# Patient Record
Sex: MALE | Race: WHITE | Employment: OTHER
[De-identification: names, ages, dates, MRNs, and addresses within clinical notes are randomized per-mention and may not be internally consistent; named-entity substitution may affect disease eponyms.]

---

## 2017-01-03 ENCOUNTER — OFFICE VISIT (OUTPATIENT)
Dept: FAMILY MEDICINE CLINIC | Facility: CLINIC | Age: 72
End: 2017-01-03

## 2017-01-03 VITALS
DIASTOLIC BLOOD PRESSURE: 76 MMHG | WEIGHT: 237.8 LBS | SYSTOLIC BLOOD PRESSURE: 134 MMHG | HEART RATE: 76 BPM | BODY MASS INDEX: 35.22 KG/M2 | HEIGHT: 69 IN

## 2017-01-03 DIAGNOSIS — M47.816 SPONDYLOSIS OF LUMBAR REGION WITHOUT MYELOPATHY OR RADICULOPATHY: ICD-10-CM

## 2017-01-03 DIAGNOSIS — E66.09 NON MORBID OBESITY DUE TO EXCESS CALORIES: ICD-10-CM

## 2017-01-03 DIAGNOSIS — K64.8 INTERNAL HEMORRHOIDS: ICD-10-CM

## 2017-01-03 DIAGNOSIS — R26.9 GAIT DISORDER: ICD-10-CM

## 2017-01-03 DIAGNOSIS — E78.2 MIXED HYPERLIPIDEMIA: ICD-10-CM

## 2017-01-03 DIAGNOSIS — K57.30 DIVERTICULOSIS OF LARGE INTESTINE WITHOUT HEMORRHAGE: ICD-10-CM

## 2017-01-03 DIAGNOSIS — I89.0 LYMPHEDEMA OF BOTH LOWER EXTREMITIES: ICD-10-CM

## 2017-01-03 DIAGNOSIS — K21.9 GASTROESOPHAGEAL REFLUX DISEASE WITHOUT ESOPHAGITIS: ICD-10-CM

## 2017-01-03 DIAGNOSIS — M21.371 FOOT DROP, RIGHT: ICD-10-CM

## 2017-01-03 DIAGNOSIS — I10 ESSENTIAL HYPERTENSION: Primary | ICD-10-CM

## 2017-01-03 DIAGNOSIS — K59.01 SLOW TRANSIT CONSTIPATION: ICD-10-CM

## 2017-01-03 PROCEDURE — 99214 OFFICE O/P EST MOD 30 MIN: CPT | Performed by: FAMILY MEDICINE

## 2017-01-03 RX ORDER — CYCLOBENZAPRINE HCL 10 MG
10 TABLET ORAL NIGHTLY PRN
Qty: 30 TABLET | Refills: 1 | Status: SHIPPED | OUTPATIENT
Start: 2017-01-03 | End: 2017-01-13

## 2017-01-03 ASSESSMENT — PATIENT HEALTH QUESTIONNAIRE - PHQ9
SUM OF ALL RESPONSES TO PHQ QUESTIONS 1-9: 0
SUM OF ALL RESPONSES TO PHQ9 QUESTIONS 1 & 2: 0
1. LITTLE INTEREST OR PLEASURE IN DOING THINGS: 0
2. FEELING DOWN, DEPRESSED OR HOPELESS: 0

## 2017-01-04 RX ORDER — BUMETANIDE 1 MG/1
TABLET ORAL
Qty: 180 TABLET | Refills: 3 | Status: SHIPPED | OUTPATIENT
Start: 2017-01-04 | End: 2017-10-11 | Stop reason: SDUPTHER

## 2017-01-04 RX ORDER — POTASSIUM CHLORIDE 20 MEQ/1
TABLET, EXTENDED RELEASE ORAL
Qty: 360 TABLET | Refills: 3 | Status: SHIPPED | OUTPATIENT
Start: 2017-01-04 | End: 2017-08-30 | Stop reason: SDUPTHER

## 2017-01-04 RX ORDER — METOPROLOL SUCCINATE 100 MG/1
TABLET, EXTENDED RELEASE ORAL
Qty: 90 TABLET | Refills: 3 | Status: SHIPPED | OUTPATIENT
Start: 2017-01-04 | End: 2018-01-29 | Stop reason: SDUPTHER

## 2017-01-04 RX ORDER — SPIRONOLACTONE 25 MG/1
TABLET ORAL
Qty: 90 TABLET | Refills: 3 | Status: SHIPPED | OUTPATIENT
Start: 2017-01-04 | End: 2017-12-11 | Stop reason: SDUPTHER

## 2017-01-04 RX ORDER — OMEPRAZOLE 20 MG/1
CAPSULE, DELAYED RELEASE ORAL
Qty: 90 CAPSULE | Refills: 3 | Status: SHIPPED | OUTPATIENT
Start: 2017-01-04 | End: 2017-12-11 | Stop reason: SDUPTHER

## 2017-01-04 RX ORDER — GABAPENTIN 300 MG/1
CAPSULE ORAL
Qty: 270 CAPSULE | Refills: 3 | Status: SHIPPED | OUTPATIENT
Start: 2017-01-04 | End: 2017-11-06 | Stop reason: SDUPTHER

## 2017-01-04 RX ORDER — HYDROCHLOROTHIAZIDE 25 MG/1
TABLET ORAL
Qty: 90 TABLET | Refills: 3 | Status: SHIPPED | OUTPATIENT
Start: 2017-01-04 | End: 2017-12-11 | Stop reason: SDUPTHER

## 2017-01-07 ASSESSMENT — ENCOUNTER SYMPTOMS
CHEST TIGHTNESS: 0
ABDOMINAL PAIN: 1
SORE THROAT: 0
BACK PAIN: 1
CONSTIPATION: 0
RHINORRHEA: 0
NAUSEA: 0
SHORTNESS OF BREATH: 1
TROUBLE SWALLOWING: 0
COUGH: 0
DIARRHEA: 0

## 2017-01-27 RX ORDER — RIVAROXABAN 20 MG/1
TABLET, FILM COATED ORAL
Qty: 30 TABLET | Refills: 3 | Status: SHIPPED | OUTPATIENT
Start: 2017-01-27 | End: 2018-06-20 | Stop reason: SDUPTHER

## 2017-01-27 RX ORDER — MULTIVITAMIN WITH FOLIC ACID 400 MCG
TABLET ORAL
Qty: 30 TABLET | Refills: 3 | Status: SHIPPED | OUTPATIENT
Start: 2017-01-27 | End: 2017-05-22 | Stop reason: SDUPTHER

## 2017-03-09 RX ORDER — CETIRIZINE HYDROCHLORIDE 10 MG/1
TABLET ORAL
Qty: 30 TABLET | Refills: 2 | Status: SHIPPED | OUTPATIENT
Start: 2017-03-09 | End: 2017-12-04 | Stop reason: SDUPTHER

## 2017-03-21 RX ORDER — CYCLOBENZAPRINE HCL 10 MG
TABLET ORAL
Qty: 30 TABLET | Refills: 1 | Status: SHIPPED | OUTPATIENT
Start: 2017-03-21 | End: 2017-08-14 | Stop reason: SDUPTHER

## 2017-04-03 ENCOUNTER — OFFICE VISIT (OUTPATIENT)
Dept: FAMILY MEDICINE CLINIC | Age: 72
End: 2017-04-03
Payer: MEDICARE

## 2017-04-03 VITALS
WEIGHT: 239 LBS | DIASTOLIC BLOOD PRESSURE: 80 MMHG | BODY MASS INDEX: 35.4 KG/M2 | HEIGHT: 69 IN | SYSTOLIC BLOOD PRESSURE: 130 MMHG | HEART RATE: 71 BPM

## 2017-04-03 DIAGNOSIS — I48.20 CHRONIC ATRIAL FIBRILLATION (HCC): ICD-10-CM

## 2017-04-03 DIAGNOSIS — J31.0 CHRONIC HYPERTROPHIC RHINITIS: ICD-10-CM

## 2017-04-03 DIAGNOSIS — K80.20 GALLSTONES: ICD-10-CM

## 2017-04-03 DIAGNOSIS — K59.01 SLOW TRANSIT CONSTIPATION: ICD-10-CM

## 2017-04-03 DIAGNOSIS — R53.83 FATIGUE, UNSPECIFIED TYPE: ICD-10-CM

## 2017-04-03 DIAGNOSIS — Z12.5 SPECIAL SCREENING FOR MALIGNANT NEOPLASM OF PROSTATE: ICD-10-CM

## 2017-04-03 DIAGNOSIS — K21.9 GASTROESOPHAGEAL REFLUX DISEASE, ESOPHAGITIS PRESENCE NOT SPECIFIED: ICD-10-CM

## 2017-04-03 DIAGNOSIS — R10.11 RIGHT UPPER QUADRANT ABDOMINAL PAIN: Primary | ICD-10-CM

## 2017-04-03 DIAGNOSIS — H91.93 HEARING IMPAIRMENT, BILATERAL: ICD-10-CM

## 2017-04-03 DIAGNOSIS — E78.2 MIXED HYPERLIPIDEMIA: ICD-10-CM

## 2017-04-03 DIAGNOSIS — E66.09 NON MORBID OBESITY DUE TO EXCESS CALORIES: ICD-10-CM

## 2017-04-03 DIAGNOSIS — I10 ESSENTIAL HYPERTENSION: ICD-10-CM

## 2017-04-03 DIAGNOSIS — Z00.00 HEALTH CARE MAINTENANCE: ICD-10-CM

## 2017-04-03 DIAGNOSIS — H69.81 EUSTACHIAN TUBE DYSFUNCTION, RIGHT: ICD-10-CM

## 2017-04-03 DIAGNOSIS — M21.371 FOOT DROP, RIGHT: ICD-10-CM

## 2017-04-03 PROCEDURE — G8417 CALC BMI ABV UP PARAM F/U: HCPCS | Performed by: FAMILY MEDICINE

## 2017-04-03 PROCEDURE — G8427 DOCREV CUR MEDS BY ELIG CLIN: HCPCS | Performed by: FAMILY MEDICINE

## 2017-04-03 PROCEDURE — 1036F TOBACCO NON-USER: CPT | Performed by: FAMILY MEDICINE

## 2017-04-03 PROCEDURE — 1123F ACP DISCUSS/DSCN MKR DOCD: CPT | Performed by: FAMILY MEDICINE

## 2017-04-03 PROCEDURE — 99214 OFFICE O/P EST MOD 30 MIN: CPT | Performed by: FAMILY MEDICINE

## 2017-04-03 PROCEDURE — 3017F COLORECTAL CA SCREEN DOC REV: CPT | Performed by: FAMILY MEDICINE

## 2017-04-03 PROCEDURE — 4040F PNEUMOC VAC/ADMIN/RCVD: CPT | Performed by: FAMILY MEDICINE

## 2017-04-03 ASSESSMENT — ENCOUNTER SYMPTOMS
RHINORRHEA: 0
COUGH: 0
NAUSEA: 0
VOICE CHANGE: 0
TROUBLE SWALLOWING: 0
DIARRHEA: 0
SHORTNESS OF BREATH: 0
ABDOMINAL PAIN: 1
BACK PAIN: 1
CONSTIPATION: 1
SORE THROAT: 0
CHEST TIGHTNESS: 0

## 2017-04-03 ASSESSMENT — PATIENT HEALTH QUESTIONNAIRE - PHQ9
1. LITTLE INTEREST OR PLEASURE IN DOING THINGS: 0
SUM OF ALL RESPONSES TO PHQ9 QUESTIONS 1 & 2: 0
2. FEELING DOWN, DEPRESSED OR HOPELESS: 0
SUM OF ALL RESPONSES TO PHQ QUESTIONS 1-9: 0

## 2017-04-10 ENCOUNTER — HOSPITAL ENCOUNTER (OUTPATIENT)
Age: 72
Discharge: HOME | End: 2017-04-10
Payer: MEDICARE

## 2017-04-10 ENCOUNTER — HOSPITAL ENCOUNTER (OUTPATIENT)
Dept: GENERAL RADIOLOGY | Age: 72
Discharge: HOME | End: 2017-04-10
Payer: MEDICARE

## 2017-04-10 ENCOUNTER — HOSPITAL ENCOUNTER (OUTPATIENT)
Dept: ULTRASOUND IMAGING | Age: 72
Discharge: HOME | End: 2017-04-10
Payer: MEDICARE

## 2017-04-10 DIAGNOSIS — R10.11 RIGHT UPPER QUADRANT ABDOMINAL PAIN: ICD-10-CM

## 2017-04-10 DIAGNOSIS — I10 ESSENTIAL HYPERTENSION: ICD-10-CM

## 2017-04-10 DIAGNOSIS — R53.83 FATIGUE, UNSPECIFIED TYPE: ICD-10-CM

## 2017-04-10 DIAGNOSIS — E78.2 MIXED HYPERLIPIDEMIA: ICD-10-CM

## 2017-04-10 DIAGNOSIS — Z12.5 SPECIAL SCREENING FOR MALIGNANT NEOPLASM OF PROSTATE: ICD-10-CM

## 2017-04-10 DIAGNOSIS — Z00.00 HEALTH CARE MAINTENANCE: ICD-10-CM

## 2017-04-10 DIAGNOSIS — J44.9 CHRONIC OBSTRUCTIVE PULMONARY DISEASE, UNSPECIFIED COPD TYPE (HCC): ICD-10-CM

## 2017-04-10 DIAGNOSIS — K80.20 GALLSTONES: ICD-10-CM

## 2017-04-10 LAB
ABSOLUTE EOS #: 0.3 K/UL (ref 0–0.4)
ABSOLUTE LYMPH #: 1.4 K/UL (ref 1–4.8)
ABSOLUTE MONO #: 0.6 K/UL (ref 0.2–0.8)
ALBUMIN SERPL-MCNC: 3.8 G/DL (ref 3.5–5.2)
ALBUMIN/GLOBULIN RATIO: NORMAL (ref 1–2.5)
ALP BLD-CCNC: 102 U/L (ref 40–129)
ALT SERPL-CCNC: 8 U/L (ref 5–41)
ANION GAP SERPL CALCULATED.3IONS-SCNC: 9 MMOL/L (ref 9–17)
AST SERPL-CCNC: 17 U/L
BASOPHILS # BLD: 0 % (ref 0–2)
BASOPHILS ABSOLUTE: 0 K/UL (ref 0–0.2)
BILIRUB SERPL-MCNC: 0.83 MG/DL (ref 0.3–1.2)
BUN BLDV-MCNC: 14 MG/DL (ref 8–23)
BUN/CREAT BLD: 17 (ref 9–20)
CALCIUM SERPL-MCNC: 9.6 MG/DL (ref 8.6–10.4)
CHLORIDE BLD-SCNC: 102 MMOL/L (ref 98–107)
CHOLESTEROL/HDL RATIO: 2.9
CHOLESTEROL: 159 MG/DL
CO2: 30 MMOL/L (ref 20–31)
CREAT SERPL-MCNC: 0.83 MG/DL (ref 0.7–1.2)
DIFFERENTIAL TYPE: ABNORMAL
EOSINOPHILS RELATIVE PERCENT: 5 % (ref 1–4)
GFR AFRICAN AMERICAN: >60 ML/MIN
GFR NON-AFRICAN AMERICAN: >60 ML/MIN
GFR SERPL CREATININE-BSD FRML MDRD: NORMAL ML/MIN/{1.73_M2}
GFR SERPL CREATININE-BSD FRML MDRD: NORMAL ML/MIN/{1.73_M2}
GLUCOSE BLD-MCNC: 99 MG/DL (ref 70–99)
HCT VFR BLD CALC: 39.4 % (ref 41–53)
HDLC SERPL-MCNC: 54 MG/DL
HEMOGLOBIN: 13.2 G/DL (ref 13.5–17.5)
HEPATITIS C ANTIBODY: NONREACTIVE
INSULIN COMMENT: NORMAL
INSULIN REFERENCE RANGE:: NORMAL
INSULIN: 14.6 MU/L
LDL CHOLESTEROL: 82 MG/DL (ref 0–130)
LYMPHOCYTES # BLD: 25 % (ref 24–44)
MCH RBC QN AUTO: 31.8 PG (ref 26–34)
MCHC RBC AUTO-ENTMCNC: 33.6 G/DL (ref 31–37)
MCV RBC AUTO: 94.5 FL (ref 80–100)
MONOCYTES # BLD: 10 % (ref 1–7)
PDW BLD-RTO: 16 % (ref 11.5–14.5)
PLATELET # BLD: 305 K/UL (ref 130–400)
PLATELET ESTIMATE: ABNORMAL
PMV BLD AUTO: 6.4 FL (ref 6–12)
POTASSIUM SERPL-SCNC: 5 MMOL/L (ref 3.7–5.3)
PROSTATE SPECIFIC ANTIGEN: 0.53 UG/L
RBC # BLD: 4.17 M/UL (ref 4.5–5.9)
RBC # BLD: ABNORMAL 10*6/UL
SEG NEUTROPHILS: 60 % (ref 36–66)
SEGMENTED NEUTROPHILS ABSOLUTE COUNT: 3.4 K/UL (ref 1.8–7.7)
SODIUM BLD-SCNC: 141 MMOL/L (ref 135–144)
TOTAL PROTEIN: 7.3 G/DL (ref 6.4–8.3)
TRIGL SERPL-MCNC: 117 MG/DL
VLDLC SERPL CALC-MCNC: NORMAL MG/DL (ref 1–30)
WBC # BLD: 5.7 K/UL (ref 3.5–11)
WBC # BLD: ABNORMAL 10*3/UL

## 2017-04-10 PROCEDURE — 71020 XR CHEST STANDARD TWO VW: CPT

## 2017-04-10 PROCEDURE — 83525 ASSAY OF INSULIN: CPT

## 2017-04-10 PROCEDURE — 80053 COMPREHEN METABOLIC PANEL: CPT

## 2017-04-10 PROCEDURE — 85025 COMPLETE CBC W/AUTO DIFF WBC: CPT

## 2017-04-10 PROCEDURE — 86803 HEPATITIS C AB TEST: CPT

## 2017-04-10 PROCEDURE — G0103 PSA SCREENING: HCPCS

## 2017-04-10 PROCEDURE — 83527 ASSAY OF INSULIN: CPT

## 2017-04-10 PROCEDURE — 80061 LIPID PANEL: CPT

## 2017-04-10 PROCEDURE — 76705 ECHO EXAM OF ABDOMEN: CPT

## 2017-04-10 PROCEDURE — 36415 COLL VENOUS BLD VENIPUNCTURE: CPT

## 2017-04-10 RX ORDER — CETIRIZINE HYDROCHLORIDE 10 MG/1
TABLET ORAL
Qty: 30 TABLET | Refills: 2 | Status: SHIPPED | OUTPATIENT
Start: 2017-04-10 | End: 2017-08-01 | Stop reason: SDUPTHER

## 2017-04-12 LAB
INSULIN FREE: 9 UIU/ML (ref 3–19)
INSULIN: 15 UIU/ML (ref 3–19)

## 2017-05-02 ENCOUNTER — OFFICE VISIT (OUTPATIENT)
Dept: FAMILY MEDICINE CLINIC | Age: 72
End: 2017-05-02
Payer: MEDICARE

## 2017-05-02 VITALS
HEIGHT: 70 IN | BODY MASS INDEX: 33.61 KG/M2 | SYSTOLIC BLOOD PRESSURE: 130 MMHG | DIASTOLIC BLOOD PRESSURE: 70 MMHG | WEIGHT: 234.8 LBS | HEART RATE: 64 BPM

## 2017-05-02 DIAGNOSIS — I10 ESSENTIAL HYPERTENSION: ICD-10-CM

## 2017-05-02 DIAGNOSIS — E78.2 MIXED HYPERLIPIDEMIA: ICD-10-CM

## 2017-05-02 DIAGNOSIS — R10.11 RIGHT UPPER QUADRANT ABDOMINAL PAIN: Primary | ICD-10-CM

## 2017-05-02 DIAGNOSIS — M21.371 FOOT DROP, RIGHT: ICD-10-CM

## 2017-05-02 DIAGNOSIS — I89.0 LYMPHEDEMA OF BOTH LOWER EXTREMITIES: ICD-10-CM

## 2017-05-02 DIAGNOSIS — K80.20 CALCULUS OF GALLBLADDER WITHOUT CHOLECYSTITIS WITHOUT OBSTRUCTION: ICD-10-CM

## 2017-05-02 DIAGNOSIS — Z99.89 OSA ON CPAP: ICD-10-CM

## 2017-05-02 DIAGNOSIS — R26.9 GAIT DISORDER: ICD-10-CM

## 2017-05-02 DIAGNOSIS — E66.09 NON MORBID OBESITY DUE TO EXCESS CALORIES: ICD-10-CM

## 2017-05-02 DIAGNOSIS — K21.9 GASTROESOPHAGEAL REFLUX DISEASE, ESOPHAGITIS PRESENCE NOT SPECIFIED: ICD-10-CM

## 2017-05-02 DIAGNOSIS — G47.33 OSA ON CPAP: ICD-10-CM

## 2017-05-02 PROCEDURE — 1036F TOBACCO NON-USER: CPT | Performed by: FAMILY MEDICINE

## 2017-05-02 PROCEDURE — G8417 CALC BMI ABV UP PARAM F/U: HCPCS | Performed by: FAMILY MEDICINE

## 2017-05-02 PROCEDURE — 3017F COLORECTAL CA SCREEN DOC REV: CPT | Performed by: FAMILY MEDICINE

## 2017-05-02 PROCEDURE — 4040F PNEUMOC VAC/ADMIN/RCVD: CPT | Performed by: FAMILY MEDICINE

## 2017-05-02 PROCEDURE — G8427 DOCREV CUR MEDS BY ELIG CLIN: HCPCS | Performed by: FAMILY MEDICINE

## 2017-05-02 PROCEDURE — 1123F ACP DISCUSS/DSCN MKR DOCD: CPT | Performed by: FAMILY MEDICINE

## 2017-05-02 PROCEDURE — 99214 OFFICE O/P EST MOD 30 MIN: CPT | Performed by: FAMILY MEDICINE

## 2017-05-02 ASSESSMENT — ENCOUNTER SYMPTOMS
CHEST TIGHTNESS: 0
TROUBLE SWALLOWING: 0
CONSTIPATION: 0
SORE THROAT: 0
DIARRHEA: 0
RHINORRHEA: 0
COUGH: 0
BACK PAIN: 0
NAUSEA: 0
SHORTNESS OF BREATH: 1
ABDOMINAL PAIN: 1

## 2017-05-02 ASSESSMENT — PATIENT HEALTH QUESTIONNAIRE - PHQ9
SUM OF ALL RESPONSES TO PHQ9 QUESTIONS 1 & 2: 0
1. LITTLE INTEREST OR PLEASURE IN DOING THINGS: 0
SUM OF ALL RESPONSES TO PHQ QUESTIONS 1-9: 0
2. FEELING DOWN, DEPRESSED OR HOPELESS: 0

## 2017-05-23 RX ORDER — MULTIVITAMIN WITH FOLIC ACID 400 MCG
TABLET ORAL
Qty: 30 TABLET | Refills: 3 | Status: SHIPPED | OUTPATIENT
Start: 2017-05-23 | End: 2017-09-13 | Stop reason: SDUPTHER

## 2017-07-06 DIAGNOSIS — J32.9 CHRONIC SINUSITIS, UNSPECIFIED LOCATION: ICD-10-CM

## 2017-07-06 RX ORDER — FLUTICASONE PROPIONATE 50 MCG
SPRAY, SUSPENSION (ML) NASAL
Qty: 4 BOTTLE | Refills: 3 | Status: ON HOLD | OUTPATIENT
Start: 2017-07-06 | End: 2018-05-26

## 2017-08-01 ENCOUNTER — OFFICE VISIT (OUTPATIENT)
Dept: FAMILY MEDICINE CLINIC | Age: 72
End: 2017-08-01
Payer: MEDICARE

## 2017-08-01 VITALS
DIASTOLIC BLOOD PRESSURE: 70 MMHG | HEIGHT: 70 IN | WEIGHT: 237 LBS | HEART RATE: 76 BPM | BODY MASS INDEX: 33.93 KG/M2 | SYSTOLIC BLOOD PRESSURE: 111 MMHG

## 2017-08-01 DIAGNOSIS — R26.9 GAIT DISORDER: ICD-10-CM

## 2017-08-01 DIAGNOSIS — B35.3 TINEA PEDIS OF BOTH FEET: Primary | ICD-10-CM

## 2017-08-01 DIAGNOSIS — M21.371 FOOT DROP, RIGHT: ICD-10-CM

## 2017-08-01 DIAGNOSIS — I48.20 CHRONIC ATRIAL FIBRILLATION (HCC): ICD-10-CM

## 2017-08-01 DIAGNOSIS — I10 ESSENTIAL HYPERTENSION: ICD-10-CM

## 2017-08-01 DIAGNOSIS — M20.11 HALLUX VALGUS, ACQUIRED, BILATERAL: ICD-10-CM

## 2017-08-01 DIAGNOSIS — K21.9 GASTROESOPHAGEAL REFLUX DISEASE, ESOPHAGITIS PRESENCE NOT SPECIFIED: ICD-10-CM

## 2017-08-01 DIAGNOSIS — M20.12 HALLUX VALGUS, ACQUIRED, BILATERAL: ICD-10-CM

## 2017-08-01 DIAGNOSIS — K59.9 FUNCTIONAL BOWEL DISORDER: ICD-10-CM

## 2017-08-01 DIAGNOSIS — I89.0 LYMPHEDEMA OF BOTH LOWER EXTREMITIES: ICD-10-CM

## 2017-08-01 DIAGNOSIS — E66.09 NON MORBID OBESITY DUE TO EXCESS CALORIES: ICD-10-CM

## 2017-08-01 DIAGNOSIS — H54.7 VISION IMPAIRMENT: ICD-10-CM

## 2017-08-01 PROCEDURE — G8427 DOCREV CUR MEDS BY ELIG CLIN: HCPCS | Performed by: FAMILY MEDICINE

## 2017-08-01 PROCEDURE — 1036F TOBACCO NON-USER: CPT | Performed by: FAMILY MEDICINE

## 2017-08-01 PROCEDURE — 1123F ACP DISCUSS/DSCN MKR DOCD: CPT | Performed by: FAMILY MEDICINE

## 2017-08-01 PROCEDURE — 99214 OFFICE O/P EST MOD 30 MIN: CPT | Performed by: FAMILY MEDICINE

## 2017-08-01 PROCEDURE — 4040F PNEUMOC VAC/ADMIN/RCVD: CPT | Performed by: FAMILY MEDICINE

## 2017-08-01 PROCEDURE — 3017F COLORECTAL CA SCREEN DOC REV: CPT | Performed by: FAMILY MEDICINE

## 2017-08-01 PROCEDURE — G8417 CALC BMI ABV UP PARAM F/U: HCPCS | Performed by: FAMILY MEDICINE

## 2017-08-14 RX ORDER — CYCLOBENZAPRINE HCL 10 MG
TABLET ORAL
Qty: 30 TABLET | Refills: 1 | Status: SHIPPED | OUTPATIENT
Start: 2017-08-14 | End: 2017-10-16 | Stop reason: SDUPTHER

## 2017-08-22 ASSESSMENT — ENCOUNTER SYMPTOMS
ABDOMINAL PAIN: 0
SHORTNESS OF BREATH: 1
BACK PAIN: 0
COUGH: 1
RHINORRHEA: 0
CHEST TIGHTNESS: 0
DIARRHEA: 1
CONSTIPATION: 1
NAUSEA: 0
TROUBLE SWALLOWING: 0
SORE THROAT: 0

## 2017-08-30 RX ORDER — POTASSIUM CHLORIDE 1500 MG/1
TABLET, EXTENDED RELEASE ORAL
Qty: 360 TABLET | Refills: 3 | Status: SHIPPED | OUTPATIENT
Start: 2017-08-30 | End: 2017-10-11 | Stop reason: SDUPTHER

## 2017-09-01 RX ORDER — SULFAMETHOXAZOLE AND TRIMETHOPRIM 800; 160 MG/1; MG/1
1 TABLET ORAL 2 TIMES DAILY
Qty: 20 TABLET | Refills: 0 | Status: SHIPPED | OUTPATIENT
Start: 2017-09-01 | End: 2017-09-11

## 2017-09-07 RX ORDER — CETIRIZINE HYDROCHLORIDE 10 MG/1
TABLET ORAL
Qty: 30 TABLET | Refills: 2 | Status: SHIPPED | OUTPATIENT
Start: 2017-09-07 | End: 2017-11-01 | Stop reason: ALTCHOICE

## 2017-09-14 RX ORDER — MULTIVITAMIN WITH FOLIC ACID 400 MCG
TABLET ORAL
Qty: 30 TABLET | Refills: 3 | Status: SHIPPED | OUTPATIENT
Start: 2017-09-14 | End: 2017-12-11 | Stop reason: SDUPTHER

## 2017-10-11 RX ORDER — BUMETANIDE 1 MG/1
TABLET ORAL
Qty: 180 TABLET | Refills: 3 | Status: SHIPPED | OUTPATIENT
Start: 2017-10-11 | End: 2018-06-20 | Stop reason: SDUPTHER

## 2017-10-11 RX ORDER — POTASSIUM CHLORIDE 20 MEQ/1
TABLET, EXTENDED RELEASE ORAL
Qty: 360 TABLET | Refills: 3 | Status: SHIPPED | OUTPATIENT
Start: 2017-10-11 | End: 2018-06-20 | Stop reason: SDUPTHER

## 2017-10-16 RX ORDER — CYCLOBENZAPRINE HCL 10 MG
TABLET ORAL
Qty: 30 TABLET | Refills: 1 | Status: SHIPPED | OUTPATIENT
Start: 2017-10-16 | End: 2017-12-19 | Stop reason: SDUPTHER

## 2017-11-01 ENCOUNTER — OFFICE VISIT (OUTPATIENT)
Dept: FAMILY MEDICINE CLINIC | Age: 72
End: 2017-11-01
Payer: MEDICARE

## 2017-11-01 VITALS
HEART RATE: 58 BPM | DIASTOLIC BLOOD PRESSURE: 67 MMHG | BODY MASS INDEX: 33.1 KG/M2 | HEIGHT: 71 IN | WEIGHT: 236.4 LBS | SYSTOLIC BLOOD PRESSURE: 98 MMHG

## 2017-11-01 DIAGNOSIS — K21.9 GASTROESOPHAGEAL REFLUX DISEASE, ESOPHAGITIS PRESENCE NOT SPECIFIED: ICD-10-CM

## 2017-11-01 DIAGNOSIS — R39.9 UTI SYMPTOMS: Primary | ICD-10-CM

## 2017-11-01 DIAGNOSIS — E78.2 MIXED HYPERLIPIDEMIA: ICD-10-CM

## 2017-11-01 DIAGNOSIS — Z99.89 OSA ON CPAP: ICD-10-CM

## 2017-11-01 DIAGNOSIS — M21.371 FOOT DROP, RIGHT: ICD-10-CM

## 2017-11-01 DIAGNOSIS — B35.9 TINEA: ICD-10-CM

## 2017-11-01 DIAGNOSIS — G47.419 PRIMARY NARCOLEPSY WITHOUT CATAPLEXY: ICD-10-CM

## 2017-11-01 DIAGNOSIS — I48.20 CHRONIC ATRIAL FIBRILLATION (HCC): ICD-10-CM

## 2017-11-01 DIAGNOSIS — G47.33 OSA ON CPAP: ICD-10-CM

## 2017-11-01 DIAGNOSIS — R26.9 GAIT DISORDER: ICD-10-CM

## 2017-11-01 DIAGNOSIS — M17.0 PRIMARY OSTEOARTHRITIS OF BOTH KNEES: ICD-10-CM

## 2017-11-01 DIAGNOSIS — E66.09 CLASS 1 OBESITY DUE TO EXCESS CALORIES WITH SERIOUS COMORBIDITY AND BODY MASS INDEX (BMI) OF 34.0 TO 34.9 IN ADULT: ICD-10-CM

## 2017-11-01 DIAGNOSIS — N30.01 ACUTE CYSTITIS WITH HEMATURIA: ICD-10-CM

## 2017-11-01 DIAGNOSIS — I10 ESSENTIAL HYPERTENSION: ICD-10-CM

## 2017-11-01 LAB
BILIRUBIN, POC: NEGATIVE
BLOOD URINE, POC: ABNORMAL
CLARITY, POC: ABNORMAL
COLOR, POC: YELLOW
GLUCOSE URINE, POC: NEGATIVE
KETONES, POC: NEGATIVE
LEUKOCYTE EST, POC: NEGATIVE
NITRITE, POC: NEGATIVE
PH, POC: 7.5
PROTEIN, POC: NEGATIVE
SPECIFIC GRAVITY, POC: 1.01
UROBILINOGEN, POC: NORMAL

## 2017-11-01 PROCEDURE — 4040F PNEUMOC VAC/ADMIN/RCVD: CPT | Performed by: FAMILY MEDICINE

## 2017-11-01 PROCEDURE — 1036F TOBACCO NON-USER: CPT | Performed by: FAMILY MEDICINE

## 2017-11-01 PROCEDURE — G8484 FLU IMMUNIZE NO ADMIN: HCPCS | Performed by: FAMILY MEDICINE

## 2017-11-01 PROCEDURE — 99214 OFFICE O/P EST MOD 30 MIN: CPT | Performed by: FAMILY MEDICINE

## 2017-11-01 PROCEDURE — G8427 DOCREV CUR MEDS BY ELIG CLIN: HCPCS | Performed by: FAMILY MEDICINE

## 2017-11-01 PROCEDURE — 1123F ACP DISCUSS/DSCN MKR DOCD: CPT | Performed by: FAMILY MEDICINE

## 2017-11-01 PROCEDURE — G8417 CALC BMI ABV UP PARAM F/U: HCPCS | Performed by: FAMILY MEDICINE

## 2017-11-01 PROCEDURE — 81002 URINALYSIS NONAUTO W/O SCOPE: CPT | Performed by: FAMILY MEDICINE

## 2017-11-01 PROCEDURE — 3017F COLORECTAL CA SCREEN DOC REV: CPT | Performed by: FAMILY MEDICINE

## 2017-11-01 RX ORDER — MODAFINIL 200 MG/1
TABLET ORAL
COMMUNITY
Start: 2017-10-11 | End: 2018-02-01

## 2017-11-01 RX ORDER — BUDESONIDE AND FORMOTEROL FUMARATE DIHYDRATE 160; 4.5 UG/1; UG/1
AEROSOL RESPIRATORY (INHALATION)
Status: ON HOLD | COMMUNITY
Start: 2017-10-09 | End: 2019-01-02

## 2017-11-01 RX ORDER — SULFAMETHOXAZOLE AND TRIMETHOPRIM 800; 160 MG/1; MG/1
1 TABLET ORAL 2 TIMES DAILY
Qty: 20 TABLET | Refills: 0 | Status: SHIPPED | OUTPATIENT
Start: 2017-11-01 | End: 2017-11-11

## 2017-11-01 ASSESSMENT — ENCOUNTER SYMPTOMS
SHORTNESS OF BREATH: 1
BLOOD IN STOOL: 0
BACK PAIN: 1
ABDOMINAL PAIN: 0
CONSTIPATION: 1
NAUSEA: 0
VOICE CHANGE: 0
TROUBLE SWALLOWING: 0
SORE THROAT: 0
DIARRHEA: 0

## 2017-11-01 NOTE — PROGRESS NOTES
[address]  [facility] [place] [address]  Dept: [phone]  Dept Fax: [phone]    Kristyn Fleming is a 71 y.o. male who presents today for his medical conditions/complaints as noted below. Kristyn Fleming is c/o of Tinea Pedis (pt here for 3 mth follow-up) and Urinary Tract Infection (pt states his urine has a fishy odor that his family has noticed)      HPI:     Patient is here for follow-up of his medical problems. His daughter is present. Also patient states he has been having problems with his urination. States that he has a smell to the urine. Also the urine appears dark. He has some frequency. No fever or chills. Also patient is concerned about discoloration of his fingernails. He has been seeing his podiatrist for his toenails. Has no headaches or dizziness. No chest pain. Gets short of breath on exertion. Weight is unchanged. Blood pressure is stable. Nonsmoker. Denies alcohol or drugs.           Social History   Substance Use Topics    Smoking status: Former Smoker     Quit date: 12/14/1976    Smokeless tobacco: Never Used    Alcohol use No      Current Outpatient Prescriptions   Medication Sig Dispense Refill    sulfamethoxazole-trimethoprim (BACTRIM DS) 800-160 MG per tablet Take 1 tablet by mouth 2 times daily for 10 days 20 tablet 0    SYMBICORT 160-4.5 MCG/ACT AERO       modafinil (PROVIGIL) 200 MG tablet       cyclobenzaprine (FLEXERIL) 10 MG tablet TAKE 1 TABLET BY MOUTH AT BEDTIME AS NEEDED 30 tablet 1    bumetanide (BUMEX) 1 MG tablet TAKE 1 TABLET BY MOUTH TWICE A  tablet 3    potassium chloride (KLOR-CON M20) 20 MEQ extended release tablet TAKE 3 TABLETS BY MOUTH TWICE A  tablet 3    Multiple Vitamin (DAILY-FELIZ) TABS TAKE 1 TABLET BY MOUTH EVERY DAY 30 tablet 3    albuterol (PROVENTIL) 2 MG tablet TAKE 1 TABLET BY MOUTH 3 TIMES A DAY  6    fluticasone (FLONASE) 50 MCG/ACT nasal spray 1 SPRAY IN for arthralgias, back pain and gait problem. Neurological: Negative for dizziness, light-headedness and headaches. Psychiatric/Behavioral: Positive for sleep disturbance. The patient is nervous/anxious. Objective:     Physical Exam   Constitutional: He is oriented to person, place, and time. He appears well-developed and well-nourished. HENT:   Head: Normocephalic. Nose: Mucosal edema present. Nose  is congested and wet. Narrow air passages. Erythema plus. Tongue is dry. Buccal mucosa appears dry. Throat is clear. Eyes: Pupils are equal, round, and reactive to light. No scleral icterus. Neck: Normal range of motion. Neck supple. No JVD present. No thyromegaly present. Neck shows no bruits. Cardiovascular: Normal rate, regular rhythm and normal heart sounds. Exam reveals no gallop and no friction rub. No murmur heard. Pulmonary/Chest: Effort normal and breath sounds normal. He has no wheezes. He has no rales. Abdominal: Soft. Bowel sounds are normal. He exhibits no mass. There is no tenderness. Liver and spleen are not palpable. Musculoskeletal:        Lumbar back: Normal.   Both legs show edema plus. Both knees are tender on flexion and extension. They're diffusely enlarged. Clinically no effusion. There is a brace on his right foot for his foot drop. SLR about 60°. He has some difficulty lying down and sitting up. He moves his arms well. No tremors noted. His gait is slow and limping. He uses a walker. Lymphadenopathy:     He has no cervical adenopathy. Neurological: He is alert and oriented to person, place, and time. He has normal reflexes. Coordination and gait normal.   Skin: Skin is warm and dry. No rash noted. His fingernails are discolored. There is irregular. No erythema noted. Psychiatric: He has a normal mood and affect. His behavior is normal. Judgment and thought content normal.   Nursing note and vitals reviewed.     BP 98/67 (Site: Right Arm, Position: Sitting, Cuff Size: Large Adult)   Pulse 58   Ht 5' 10.5\" (1.791 m)   Wt 236 lb 6.4 oz (107.2 kg)   BMI 33.44 kg/m²     Assessment:      1. UTI symptoms  POCT Urinalysis no Micro    sulfamethoxazole-trimethoprim (BACTRIM DS) 800-160 MG per tablet   2. Acute cystitis with hematuria  sulfamethoxazole-trimethoprim (BACTRIM DS) 800-160 MG per tablet   3. Janki Marie MD, [facility] [place]   4. Essential hypertension     5. MARIAMA on CPAP     6. Chronic atrial fibrillation (HCC)     7. Mixed hyperlipidemia     8. Gastroesophageal reflux disease, esophagitis presence not specified     9. Primary osteoarthritis of both knees     10. Primary narcolepsy without cataplexy     11. Foot drop, right     12. Gait disorder     13. Class 1 obesity due to excess calories with serious comorbidity and body mass index (BMI) of 34.0 to 34.9 in adult         Plan:      Return in about 3 months (around 2/1/2018). Orders Placed This Encounter   Procedures   Cece Nelson MD, [facility] [place]     Referral Priority:   Routine     Referral Type:   Consult for Advice and Opinion     Referral Reason:   Specialty Services Required     Referred to Provider:   Hilaroi Trimble MD     Requested Specialty:   Dermatology     Number of Visits Requested:   1    POCT Urinalysis no Micro     Orders Placed This Encounter   Medications    sulfamethoxazole-trimethoprim (BACTRIM DS) 800-160 MG per tablet     Sig: Take 1 tablet by mouth 2 times daily for 10 days     Dispense:  20 tablet     Refill:  0         Findings and/or pathophysiology discussed with patient. Plan of treatment discussed. Patient's medical problems were discussed with him. His daughter is present. Findings were explained. Pathophysiology was discussed. Chart was evaluated. Available lab work and tests results were discussed. Specialists notes were discussed. Is urinary problems were discussed in detail.   Urine analysis was

## 2017-11-06 RX ORDER — GABAPENTIN 300 MG/1
CAPSULE ORAL
Qty: 270 CAPSULE | Refills: 3 | Status: SHIPPED | OUTPATIENT
Start: 2017-11-06 | End: 2018-06-20 | Stop reason: SDUPTHER

## 2017-11-06 NOTE — TELEPHONE ENCOUNTER
Requested Prescriptions     Pending Prescriptions Disp Refills    gabapentin (NEURONTIN) 300 MG capsule 270 capsule 3     Sig: TAKE ONE CAPSULE BY MOUTH 3 TIMES A DAY     Next Visit Date:  Future Appointments  Date Time Provider Jeri Flynn   2/1/2018 9:15 AM Jigar Ni MD [address] Maintenance   Topic Date Due    DTaP/Tdap/Td vaccine (1 - Tdap) 04/03/2018 (Originally 10/31/1964)    Pneumococcal low/med risk (1 of 2 - PCV13) 04/03/2018 (Originally 10/31/2011)    Flu vaccine (1) 08/01/2018 (Originally 9/1/2017)    Lipid screen  04/10/2022    Colon cancer screen colonoscopy  03/26/2023    Zostavax vaccine  Addressed    AAA screen  Completed    Hepatitis C screen  Completed       No results found for: LABA1C          ( goal A1C is < 7)   No results found for: LABMICR  LDL Cholesterol (mg/dL)   Date Value   04/10/2017 82     LDL Calculated (mg/dL)   Date Value   10/30/2013 67       (goal LDL is <100)   AST (U/L)   Date Value   04/10/2017 17     ALT (U/L)   Date Value   04/10/2017 8     BUN (mg/dL)   Date Value   04/10/2017 14     BP Readings from Last 3 Encounters:   11/01/17 98/67   08/01/17 111/70   05/02/17 130/70          (goal 120/80)    All Future Testing planned in [facility]  Lab Frequency Next Occurrence               Patient Active Problem List:     Atrial fibrillation (HCC)     Hyperlipidemia     GERD (gastroesophageal reflux disease)     Osteoarthritis of lumbar spine     OA (osteoarthritis) of knee, bilateral     Foot drop, right     Narcolepsy     Sleep apnea     Impaired hearing     Eustachian tube dysfunction     Chronic rhinosinusitis     Special screening for malignant neoplasm of prostate     Lymphedema of both lower extremities     Hallux valgus, acquired, bilateral     Tinnitus     Hearing impairment     Gait disorder     S/P revision of total knee     Fracture of femur ([address]     Weight loss     Essential hypertension     Generalized abdominal pain

## 2017-12-01 ENCOUNTER — OFFICE VISIT (OUTPATIENT)
Dept: DERMATOLOGY | Age: 72
End: 2017-12-01
Payer: MEDICARE

## 2017-12-01 VITALS
HEIGHT: 71 IN | OXYGEN SATURATION: 100 % | WEIGHT: 235.4 LBS | HEART RATE: 69 BPM | BODY MASS INDEX: 32.96 KG/M2 | SYSTOLIC BLOOD PRESSURE: 105 MMHG | DIASTOLIC BLOOD PRESSURE: 66 MMHG

## 2017-12-01 DIAGNOSIS — B35.1 ONYCHOMYCOSIS: Primary | ICD-10-CM

## 2017-12-01 PROCEDURE — G8427 DOCREV CUR MEDS BY ELIG CLIN: HCPCS | Performed by: DERMATOLOGY

## 2017-12-01 PROCEDURE — 99202 OFFICE O/P NEW SF 15 MIN: CPT | Performed by: DERMATOLOGY

## 2017-12-01 PROCEDURE — 1036F TOBACCO NON-USER: CPT | Performed by: DERMATOLOGY

## 2017-12-01 PROCEDURE — G8417 CALC BMI ABV UP PARAM F/U: HCPCS | Performed by: DERMATOLOGY

## 2017-12-01 PROCEDURE — 1123F ACP DISCUSS/DSCN MKR DOCD: CPT | Performed by: DERMATOLOGY

## 2017-12-01 PROCEDURE — G8484 FLU IMMUNIZE NO ADMIN: HCPCS | Performed by: DERMATOLOGY

## 2017-12-01 PROCEDURE — 3017F COLORECTAL CA SCREEN DOC REV: CPT | Performed by: DERMATOLOGY

## 2017-12-01 PROCEDURE — 4040F PNEUMOC VAC/ADMIN/RCVD: CPT | Performed by: DERMATOLOGY

## 2017-12-01 RX ORDER — TERBINAFINE HYDROCHLORIDE 250 MG/1
250 TABLET ORAL DAILY
Qty: 45 TABLET | Refills: 0 | Status: SHIPPED | OUTPATIENT
Start: 2017-12-01 | End: 2018-01-24 | Stop reason: SDUPTHER

## 2017-12-01 NOTE — PATIENT INSTRUCTIONS
1. Take terbinafine 250 mg ( 1 pill) daily for 6 weeks, labs in 6 weeks pending normal labs I will send in another 6 weeks of medication  2. Labs in 6 weeks  3. Apply Penlac to nails nightly  4.  Follow up as needed

## 2017-12-01 NOTE — PROGRESS NOTES
Dermatology Patient Note  [address]  [address] [employer] [address] [place] [address] [address] [address]  Dept: [phone]  Dept Fax: [phone]      VISIT DATE: 12/1/2017   REFERRING PROVIDER: Sen Fry MD      Crow Street is a 71 y.o. male  who presents today in the office for:    New Patient (nail fungus.  Using a fungal polish and it seems to be helping)      HISTORY OF PRESENT ILLNESS:  HPI Rash:    Eulogio Harrington was seen today for initial evaluation of onycomycosis    Duration of Rash: years    Course: persistent    Areas of Involvement: hands and feet    Associated Symptoms: None    Exacerbating Factors: none     Current Medications for this Rash:  tolnaftate     Previously Tried Medications: None    Problem Specific Family Hx: No Contributory Family History      CURRENT MEDICATIONS:   Current Outpatient Prescriptions   Medication Sig Dispense Refill    terbinafine (LAMISIL) 250 MG tablet Take 1 tablet by mouth daily 45 tablet 0    ciclopirox (PENLAC) 8 % solution Apply topically nightly to nails 6 mL 11    gabapentin (NEURONTIN) 300 MG capsule TAKE ONE CAPSULE BY MOUTH 3 TIMES A  capsule 3    SYMBICORT 160-4.5 MCG/ACT AERO       modafinil (PROVIGIL) 200 MG tablet       cyclobenzaprine (FLEXERIL) 10 MG tablet TAKE 1 TABLET BY MOUTH AT BEDTIME AS NEEDED 30 tablet 1    bumetanide (BUMEX) 1 MG tablet TAKE 1 TABLET BY MOUTH TWICE A  tablet 3    potassium chloride (KLOR-CON M20) 20 MEQ extended release tablet TAKE 3 TABLETS BY MOUTH TWICE A  tablet 3    Multiple Vitamin (DAILY-FELIZ) TABS TAKE 1 TABLET BY MOUTH EVERY DAY 30 tablet 3    albuterol (PROVENTIL) 2 MG tablet TAKE 1 TABLET BY MOUTH 3 TIMES A DAY  6    fluticasone (FLONASE) 50 MCG/ACT nasal spray 1 SPRAY IN BOTH NOSTRILS TWICE A DAY 4 Bottle 3    cetirizine (ZYRTEC) 10 MG tablet TAKE 1 TABLET EVERY DAY 30 tablet 2    XARELTO 20 MG TABS tablet TAKE 1 TABLET BY MOUTH AT BEDTIME 30 tablet 3   

## 2017-12-01 NOTE — LETTER
[facility] ([facility]) Dermatology   [address] [address] [address]  Phone: [phone]  Fax: [phone]     Layc Ireland MD       December 1, 2017     Md Jaz Miller, [address] [facility], [address] [facility]     Patient: Octavia Webber   MR Number: [id number]   YOB: 1945   Date of Visit: 12/1/2017     Dear Dr. Olivia Reynolds: Thank you for the request for consultation for Mr. Kian Perez to me for evaluation. Below are the relevant portions of my assessment and plan of care. 1. Onychomycosis  - Start oral terbinafine 250 mg daily for 6 weeks, labs in 6 weeks then additional 6 week course. Discussed risks of taste disturbance and liver inflammation including signs and symptoms of liver inflammation. Patient to call if she has any problems while on the medication.   - Penlac x 1 year  - AST; Future  - ALT; Future       Patient Instructions   1. Take terbinafine 250 mg ( 1 pill) daily for 6 weeks, labs in 6 weeks pending normal labs I will send in another 6 weeks of medication  2. Labs in 6 weeks  3. Apply Penlac to nails nightly  4. Follow up as needed        If you have questions, please do not hesitate to call me. I look forward to following Romeo Weber along with you.     Sincerely,        Lacy Ireland MD

## 2017-12-05 RX ORDER — CETIRIZINE HYDROCHLORIDE 10 MG/1
TABLET ORAL
Qty: 28 TABLET | Refills: 1 | Status: SHIPPED | OUTPATIENT
Start: 2017-12-05 | End: 2018-01-13 | Stop reason: SDUPTHER

## 2017-12-11 RX ORDER — OMEPRAZOLE 20 MG/1
CAPSULE, DELAYED RELEASE ORAL
Qty: 28 CAPSULE | Refills: 1 | Status: SHIPPED | OUTPATIENT
Start: 2017-12-11 | End: 2018-03-09 | Stop reason: SDUPTHER

## 2017-12-11 RX ORDER — MULTIVITAMIN WITH FOLIC ACID 400 MCG
TABLET ORAL
Qty: 28 TABLET | Refills: 1 | Status: SHIPPED | OUTPATIENT
Start: 2017-12-11 | End: 2018-01-13 | Stop reason: SDUPTHER

## 2017-12-11 RX ORDER — HYDROCHLOROTHIAZIDE 25 MG/1
TABLET ORAL
Qty: 28 TABLET | Refills: 1 | Status: SHIPPED | OUTPATIENT
Start: 2017-12-11 | End: 2018-01-13 | Stop reason: SDUPTHER

## 2017-12-11 RX ORDER — SPIRONOLACTONE 25 MG/1
TABLET ORAL
Qty: 28 TABLET | Refills: 1 | Status: SHIPPED | OUTPATIENT
Start: 2017-12-11 | End: 2018-01-13 | Stop reason: SDUPTHER

## 2017-12-11 NOTE — TELEPHONE ENCOUNTER
Requested Prescriptions     Pending Prescriptions Disp Refills    hydrochlorothiazide (HYDRODIURIL) 25 MG tablet [Pharmacy Med Name: HYDROCHLOROTHIAZIDE 25MG ORAL TABLET] 28 tablet 1     Sig: TAKE 1 TABLET BY MOUTH DAILY    spironolactone (ALDACTONE) 25 MG tablet [Pharmacy Med Name: SPIRONOLACTONE 25MG ORAL TABLET] 28 tablet 1     Sig: TAKE 1 TABLET BY MOUTH DAILY    omeprazole (PRILOSEC) 20 MG delayed release capsule [Pharmacy Med Name: OMEPRAZOLE 20MG ORAL CAPSULE] 28 capsule 1     Sig: TAKE 1 CAPSULE BY MOUTH DAILY    Multiple Vitamin (MULTIVITAMIN) tablet [Pharmacy Med Name: TAB-A-FELIZ ORAL TABLET] 28 tablet 1     Sig: TAKE 1 TABLET BY MOUTH DAILY     Next Visit Date:  Future Appointments  Date Time Provider Jeri Flynn   2/1/2018 9:15 AM Tomas Ho MD [address] Maintenance   Topic Date Due    DTaP/Tdap/Td vaccine (1 - Tdap) 04/03/2018 (Originally 10/31/1964)    Pneumococcal low/med risk (1 of 2 - PCV13) 04/03/2018 (Originally 10/31/2011)    Flu vaccine (1) 08/01/2018 (Originally 9/1/2017)    Lipid screen  04/10/2022    Colon cancer screen colonoscopy  03/26/2023    Zostavax vaccine  Addressed    AAA screen  Completed    Hepatitis C screen  Completed       No results found for: LABA1C          ( goal A1C is < 7)   No results found for: LABMICR  LDL Cholesterol (mg/dL)   Date Value   04/10/2017 82     LDL Calculated (mg/dL)   Date Value   10/30/2013 67       (goal LDL is <100)   AST (U/L)   Date Value   04/10/2017 17     ALT (U/L)   Date Value   04/10/2017 8     BUN (mg/dL)   Date Value   04/10/2017 14     BP Readings from Last 3 Encounters:   12/01/17 105/66   11/01/17 98/67   08/01/17 111/70          (goal 120/80)    All Future Testing planned in [facility]  Lab Frequency Next Occurrence   AST Once 12/01/2017   ALT Once 12/01/2017               Patient Active Problem List:     Atrial fibrillation (HCC)     Hyperlipidemia     GERD (gastroesophageal reflux disease)     Osteoarthritis of lumbar spine     OA (osteoarthritis) of knee, bilateral     Foot drop, right     Narcolepsy     Sleep apnea     Impaired hearing     Eustachian tube dysfunction     Chronic rhinosinusitis     Special screening for malignant neoplasm of prostate     Lymphedema of both lower extremities     Hallux valgus, acquired, bilateral     Tinnitus     Hearing impairment     Gait disorder     S/P revision of total knee     Fracture of femur (HCC)     Weight loss     Essential hypertension     Generalized abdominal pain     Bleeding hemorrhoids     Slow transit constipation     Internal hemorrhoids     Calculus of gallbladder without cholecystitis without obstruction     MARIAMA on CPAP     Functional bowel disorder

## 2017-12-19 NOTE — TELEPHONE ENCOUNTER
Requested Prescriptions     Pending Prescriptions Disp Refills    cyclobenzaprine (FLEXERIL) 10 MG tablet [Pharmacy Med Name: CYCLOBENZAPRINE 10 MG TABLET] 30 tablet 1     Sig: TAKE 1 TABLET BY MOUTH AT BEDTIME AS NEEDED     Next Visit Date:  Future Appointments  Date Time Provider Jeri Flynn   2/1/2018 9:15 AM Olayinka Acosta MD [address] Maintenance   Topic Date Due    DTaP/Tdap/Td vaccine (1 - Tdap) 04/03/2018 (Originally 10/31/1965)    Pneumococcal low/med risk (1 of 2 - PCV13) 04/03/2018 (Originally 10/31/2011)    Flu vaccine (1) 08/01/2018 (Originally 9/1/2017)    Lipid screen  04/10/2022    Colon cancer screen colonoscopy  03/26/2023    Zostavax vaccine  Addressed    AAA screen  Completed    Hepatitis C screen  Completed       No results found for: LABA1C          ( goal A1C is < 7)   No results found for: LABMICR  LDL Cholesterol (mg/dL)   Date Value   04/10/2017 82     LDL Calculated (mg/dL)   Date Value   10/30/2013 67       (goal LDL is <100)   AST (U/L)   Date Value   04/10/2017 17     ALT (U/L)   Date Value   04/10/2017 8     BUN (mg/dL)   Date Value   04/10/2017 14     BP Readings from Last 3 Encounters:   12/01/17 105/66   11/01/17 98/67   08/01/17 111/70          (goal 120/80)    All Future Testing planned in [facility]  Lab Frequency Next Occurrence   AST Once 12/01/2017   ALT Once 12/01/2017               Patient Active Problem List:     Atrial fibrillation (HCC)     Hyperlipidemia     GERD (gastroesophageal reflux disease)     Osteoarthritis of lumbar spine     OA (osteoarthritis) of knee, bilateral     Foot drop, right     Narcolepsy     Sleep apnea     Impaired hearing     Eustachian tube dysfunction     Chronic rhinosinusitis     Special screening for malignant neoplasm of prostate     Lymphedema of both lower extremities     Hallux valgus, acquired, bilateral     Tinnitus     Hearing impairment     Gait disorder     S/P revision of total knee

## 2017-12-20 RX ORDER — CYCLOBENZAPRINE HCL 10 MG
TABLET ORAL
Qty: 30 TABLET | Refills: 1 | Status: SHIPPED | OUTPATIENT
Start: 2017-12-20 | End: 2018-03-10 | Stop reason: SDUPTHER

## 2018-01-15 RX ORDER — CETIRIZINE HYDROCHLORIDE 10 MG/1
TABLET ORAL
Qty: 28 TABLET | Refills: 0 | Status: SHIPPED | OUTPATIENT
Start: 2018-01-15 | End: 2018-03-09 | Stop reason: SDUPTHER

## 2018-01-15 RX ORDER — MULTIVITAMIN WITH FOLIC ACID 400 MCG
TABLET ORAL
Qty: 28 TABLET | Refills: 3 | Status: SHIPPED | OUTPATIENT
Start: 2018-01-15 | End: 2018-06-04 | Stop reason: SDUPTHER

## 2018-01-15 RX ORDER — SPIRONOLACTONE 25 MG/1
TABLET ORAL
Qty: 28 TABLET | Refills: 3 | Status: SHIPPED | OUTPATIENT
Start: 2018-01-15 | End: 2018-05-19 | Stop reason: SDUPTHER

## 2018-01-15 RX ORDER — HYDROCHLOROTHIAZIDE 25 MG/1
TABLET ORAL
Qty: 28 TABLET | Refills: 3 | Status: SHIPPED | OUTPATIENT
Start: 2018-01-15 | End: 2018-05-19 | Stop reason: SDUPTHER

## 2018-01-24 ENCOUNTER — TELEPHONE (OUTPATIENT)
Dept: DERMATOLOGY | Age: 73
End: 2018-01-24

## 2018-01-24 ENCOUNTER — HOSPITAL ENCOUNTER (OUTPATIENT)
Age: 73
Discharge: HOME | End: 2018-01-24
Payer: MEDICARE

## 2018-01-24 DIAGNOSIS — B35.1 ONYCHOMYCOSIS: ICD-10-CM

## 2018-01-24 LAB
ALT SERPL-CCNC: 8 U/L (ref 5–41)
AST SERPL-CCNC: 17 U/L

## 2018-01-24 PROCEDURE — 36415 COLL VENOUS BLD VENIPUNCTURE: CPT

## 2018-01-24 PROCEDURE — 84450 TRANSFERASE (AST) (SGOT): CPT

## 2018-01-24 PROCEDURE — 84460 ALANINE AMINO (ALT) (SGPT): CPT

## 2018-01-24 RX ORDER — TERBINAFINE HYDROCHLORIDE 250 MG/1
250 TABLET ORAL DAILY
Qty: 45 TABLET | Refills: 0 | Status: ON HOLD | OUTPATIENT
Start: 2018-01-24 | End: 2018-05-26

## 2018-01-29 RX ORDER — METOPROLOL SUCCINATE 100 MG/1
TABLET, EXTENDED RELEASE ORAL
Qty: 90 TABLET | Refills: 3 | Status: SHIPPED | OUTPATIENT
Start: 2018-01-29 | End: 2018-04-10 | Stop reason: SDUPTHER

## 2018-01-29 NOTE — TELEPHONE ENCOUNTER
Requested Prescriptions     Pending Prescriptions Disp Refills    metoprolol succinate (TOPROL XL) 100 MG extended release tablet [Pharmacy Med Name: METOPROLOL SUCCINATE ER 100MG ORAL TABLET] 90 tablet 3     Sig: TAKE 1 TABLET BY MOUTH DAILY     Next Visit Date:  Future Appointments  Date Time Provider Jeri Flynn   2/1/2018 9:15 AM Tony Hill MD [address] Maintenance   Topic Date Due    DTaP/Tdap/Td vaccine (1 - Tdap) 04/03/2018 (Originally 10/31/1965)    Pneumococcal low/med risk (1 of 2 - PCV13) 04/03/2018 (Originally 10/31/2011)    Flu vaccine (1) 08/01/2018 (Originally 9/1/2017)    Potassium monitoring  04/10/2018    Creatinine monitoring  04/10/2018    Lipid screen  04/10/2022    Colon cancer screen colonoscopy  03/26/2023    Zostavax vaccine  Addressed    AAA screen  Completed    Hepatitis C screen  Completed       No results found for: LABA1C          ( goal A1C is < 7)   No results found for: LABMICR  LDL Cholesterol (mg/dL)   Date Value   04/10/2017 82     LDL Calculated (mg/dL)   Date Value   10/30/2013 67       (goal LDL is <100)   AST (U/L)   Date Value   01/24/2018 17     ALT (U/L)   Date Value   01/24/2018 8     BUN (mg/dL)   Date Value   04/10/2017 14     BP Readings from Last 3 Encounters:   12/01/17 105/66   11/01/17 98/67   08/01/17 111/70          (goal 120/80)    All Future Testing planned in [facility]  Lab Frequency Next Occurrence               Patient Active Problem List:     Atrial fibrillation (HCC)     Hyperlipidemia     GERD (gastroesophageal reflux disease)     Osteoarthritis of lumbar spine     OA (osteoarthritis) of knee, bilateral     Foot drop, right     Narcolepsy     Sleep apnea     Impaired hearing     Eustachian tube dysfunction     Chronic rhinosinusitis     Special screening for malignant neoplasm of prostate     Lymphedema of both lower extremities     Hallux valgus, acquired, bilateral     Tinnitus     Hearing impairment Gait disorder     S/P revision of total knee     Fracture of femur (HCC)     Weight loss     Essential hypertension     Generalized abdominal pain     Bleeding hemorrhoids     Slow transit constipation     Internal hemorrhoids     Calculus of gallbladder without cholecystitis without obstruction     MARIAMA on CPAP     Functional bowel disorder

## 2018-02-01 ENCOUNTER — OFFICE VISIT (OUTPATIENT)
Dept: FAMILY MEDICINE CLINIC | Age: 73
End: 2018-02-01
Payer: MEDICARE

## 2018-02-01 VITALS
DIASTOLIC BLOOD PRESSURE: 68 MMHG | WEIGHT: 238 LBS | HEART RATE: 62 BPM | HEIGHT: 70 IN | BODY MASS INDEX: 34.07 KG/M2 | SYSTOLIC BLOOD PRESSURE: 108 MMHG | OXYGEN SATURATION: 96 %

## 2018-02-01 DIAGNOSIS — R35.0 URINE FREQUENCY: ICD-10-CM

## 2018-02-01 DIAGNOSIS — E78.2 MIXED HYPERLIPIDEMIA: ICD-10-CM

## 2018-02-01 DIAGNOSIS — Z12.5 SPECIAL SCREENING FOR MALIGNANT NEOPLASM OF PROSTATE: ICD-10-CM

## 2018-02-01 DIAGNOSIS — I10 ESSENTIAL HYPERTENSION: Primary | ICD-10-CM

## 2018-02-01 DIAGNOSIS — I48.20 CHRONIC ATRIAL FIBRILLATION (HCC): ICD-10-CM

## 2018-02-01 PROBLEM — K59.9 FUNCTIONAL BOWEL DISORDER: Status: RESOLVED | Noted: 2017-08-01 | Resolved: 2018-02-01

## 2018-02-01 LAB
BILIRUBIN, POC: ABNORMAL
BLOOD URINE, POC: ABNORMAL
CLARITY, POC: CLEAR
COLOR, POC: ABNORMAL
GLUCOSE URINE, POC: ABNORMAL
KETONES, POC: ABNORMAL
LEUKOCYTE EST, POC: ABNORMAL
NITRITE, POC: ABNORMAL
PH, POC: 7.5
PROTEIN, POC: ABNORMAL
SPECIFIC GRAVITY, POC: 1.01
UROBILINOGEN, POC: ABNORMAL

## 2018-02-01 PROCEDURE — 99213 OFFICE O/P EST LOW 20 MIN: CPT | Performed by: PHYSICIAN ASSISTANT

## 2018-02-01 PROCEDURE — 81003 URINALYSIS AUTO W/O SCOPE: CPT | Performed by: PHYSICIAN ASSISTANT

## 2018-02-01 RX ORDER — SULFAMETHOXAZOLE AND TRIMETHOPRIM 800; 160 MG/1; MG/1
1 TABLET ORAL 2 TIMES DAILY
Qty: 20 TABLET | Refills: 0 | Status: SHIPPED | OUTPATIENT
Start: 2018-02-01 | End: 2018-12-20 | Stop reason: SDUPTHER

## 2018-02-01 ASSESSMENT — ENCOUNTER SYMPTOMS
SHORTNESS OF BREATH: 0
ABDOMINAL PAIN: 0
CONSTIPATION: 0
VOMITING: 0
NAUSEA: 0
SPUTUM PRODUCTION: 1
DIARRHEA: 0
BACK PAIN: 0
COUGH: 1

## 2018-02-01 NOTE — PROGRESS NOTES
Narrative    No narrative on file       REVIEW OF SYSTEMS      Review of Systems   Constitutional: Negative for chills and fever. HENT: Negative for congestion. Respiratory: Positive for cough and sputum production (light yellow). Negative for shortness of breath. Cardiovascular: Negative for chest pain. Gastrointestinal: Negative for abdominal pain, constipation, diarrhea, nausea and vomiting. Genitourinary: Positive for dysuria and frequency. Musculoskeletal: Negative for back pain and neck pain. Neurological: Positive for headaches. Negative for dizziness. /68   Pulse 62   Ht 5' 10\" (1.778 m)   Wt 238 lb (108 kg)   SpO2 96%   BMI 34.15 kg/m²     GENERAL PHYSICAL EXAM     Physical Exam   Constitutional: He is oriented to person, place, and time and well-developed, well-nourished, and in no distress. HENT:   Head: Normocephalic and atraumatic. Eyes: Pupils are equal, round, and reactive to light. Cardiovascular: An irregularly irregular rhythm present. Pulmonary/Chest: Effort normal and breath sounds normal.   Abdominal: Soft. He exhibits no mass. There is no tenderness. Musculoskeletal: Normal range of motion. Right ankle: He exhibits swelling. Left ankle: He exhibits swelling. Neurological: He is alert and oriented to person, place, and time. Gait normal.   Skin: Skin is warm and dry. Assessment     1. Essential hypertension  CBC Auto Differential    Basic Metabolic Panel   2. Urine frequency  POCT Urinalysis No Micro (Auto)    sulfamethoxazole-trimethoprim (BACTRIM DS) 800-160 MG per tablet   3. Chronic atrial fibrillation ([address]     4. Mixed hyperlipidemia  Lipid Panel   5. Special screening for malignant neoplasm of prostate  PSA Screening       Plan         Long discussion with patient, Patient is here for follow-up for high blood pressure, complaining of increased urinary frequency. Patient was treated for UTI in November 2017.   Patient states he is compliant with medications. Findings were explained, blood pressure is stable, irregularly irregular rhythm, bilateral ankle edema, UA revealed trace amounts of leukocytes, moderate amount of blood, remainder of physical exam unremarkable. Plan of treatment discussed, informed patient will be placed on antibiotic for UTI, no changes to medications. Patient did gain 3lbs. since November 2017, discussed the correlation between weight loss and high blood pressure, patient voices understanding. [facility] review, atrial fibrillation is stable, being followed by cardiologist.  His medications were reviewed, prescribed Bactrim 800-160 milligrams to be taken twice daily for 10 days. Labs reviewed, order CBC, BMP, lipid panel, PSA, instructed patient to have labs done before his appointment, patient voices understanding. We will see patient back in about 3 months or earlier if any problems.     Orders Placed This Encounter   Procedures    CBC Auto Differential     Standing Status:   Future     Standing Expiration Date:   2/1/2019    Lipid Panel     Standing Status:   Future     Standing Expiration Date:   2/1/2019     Order Specific Question:   Is Patient Fasting?/# of Hours     Answer:   12 hours    Basic Metabolic Panel     Standing Status:   Future     Standing Expiration Date:   2/1/2019    PSA Screening     Standing Status:   Future     Standing Expiration Date:   2/1/2019    POCT Urinalysis No Micro (Auto)     Outpatient Encounter Prescriptions as of 2/1/2018   Medication Sig Dispense Refill    sulfamethoxazole-trimethoprim (BACTRIM DS) 800-160 MG per tablet Take 1 tablet by mouth 2 times daily for 10 days 20 tablet 0    metoprolol succinate (TOPROL XL) 100 MG extended release tablet TAKE 1 TABLET BY MOUTH DAILY 90 tablet 3    terbinafine (LAMISIL) 250 MG tablet Take 1 tablet by mouth daily 45 tablet 0    hydrochlorothiazide (HYDRODIURIL) 25 MG tablet TAKE 1 TABLET BY MOUTH DAILY 28

## 2018-03-01 ENCOUNTER — TELEPHONE (OUTPATIENT)
Dept: FAMILY MEDICINE CLINIC | Age: 73
End: 2018-03-01

## 2018-03-01 NOTE — TELEPHONE ENCOUNTER
Yoly Mcknight called asking if Dr. Serge Merritt has sent anything over for Dr. Prabhu Staples to sign, I said I haven't seen anything yet, but I will keep looking, and let him know.

## 2018-03-10 RX ORDER — CETIRIZINE HYDROCHLORIDE 10 MG/1
TABLET ORAL
Qty: 28 TABLET | Refills: 1 | Status: SHIPPED | OUTPATIENT
Start: 2018-03-10 | End: 2018-04-06 | Stop reason: SDUPTHER

## 2018-03-10 RX ORDER — OMEPRAZOLE 20 MG/1
CAPSULE, DELAYED RELEASE ORAL
Qty: 28 CAPSULE | Refills: 1 | Status: SHIPPED | OUTPATIENT
Start: 2018-03-10 | End: 2018-04-06 | Stop reason: SDUPTHER

## 2018-03-12 RX ORDER — CYCLOBENZAPRINE HCL 10 MG
TABLET ORAL
Qty: 30 TABLET | Refills: 0 | Status: SHIPPED | OUTPATIENT
Start: 2018-03-12 | End: 2018-04-06 | Stop reason: SDUPTHER

## 2018-03-12 NOTE — TELEPHONE ENCOUNTER
Health Maintenance   Topic Date Due    Shingles Vaccine (1 of 2 - 2 Dose Series) 10/31/1996    DTaP/Tdap/Td vaccine (1 - Tdap) 04/03/2018 (Originally 10/31/1965)    Pneumococcal low/med risk (1 of 2 - PCV13) 04/03/2018 (Originally 10/31/2011)    Flu vaccine (1) 08/01/2018 (Originally 9/1/2017)    Potassium monitoring  04/10/2018    Creatinine monitoring  04/10/2018    Lipid screen  04/10/2022    Colon cancer screen colonoscopy  03/26/2023    AAA screen  Completed    Hepatitis C screen  Completed             (applicable per patient's age: Cancer Screenings, Depression Screening, Fall Risk Screening, Immunizations)    LDL Cholesterol (mg/dL)   Date Value   04/10/2017 82     LDL Calculated (mg/dL)   Date Value   10/30/2013 67     AST (U/L)   Date Value   01/24/2018 17     ALT (U/L)   Date Value   01/24/2018 8     BUN (mg/dL)   Date Value   04/10/2017 14      (goal A1C is < 7)   (goal LDL is <100) need 30-50% reduction from baseline     BP Readings from Last 3 Encounters:   02/01/18 108/68   12/01/17 105/66   11/01/17 98/67    (goal /80)      All Future Testing planned in [facility]:  Lab Frequency Next Occurrence   CBC Auto Differential Once 08/01/2018   Lipid Panel Once 77/12/9957   Basic Metabolic Panel Once 10/72/0807   PSA Screening Once 06/26/2018       Next Visit Date:  Future Appointments  Date Time Provider Jeri Flynn   5/2/2018 9:00 AM Graham Guadalupe MD [address] [facility]            Patient Active Problem List:     Atrial fibrillation ([facility] [address]     Hyperlipidemia     GERD (gastroesophageal reflux disease)     Osteoarthritis of lumbar spine     OA (osteoarthritis) of knee, bilateral     Foot drop, right     Narcolepsy     Sleep apnea     Impaired hearing     Eustachian tube dysfunction     Chronic rhinosinusitis     Special screening for malignant neoplasm of prostate     Lymphedema of both lower extremities     Hallux valgus, acquired, bilateral     Tinnitus     Gait disorder     S/P revision of total knee     Fracture of femur (HCC)     Weight loss     Essential hypertension     Generalized abdominal pain     Bleeding hemorrhoids     Slow transit constipation     Internal hemorrhoids     Calculus of gallbladder without cholecystitis without obstruction     MARIAMA on CPAP

## 2018-04-03 ENCOUNTER — OFFICE VISIT (OUTPATIENT)
Dept: FAMILY MEDICINE CLINIC | Age: 73
End: 2018-04-03
Payer: MEDICARE

## 2018-04-03 VITALS
DIASTOLIC BLOOD PRESSURE: 68 MMHG | SYSTOLIC BLOOD PRESSURE: 102 MMHG | TEMPERATURE: 97.8 F | HEIGHT: 70 IN | BODY MASS INDEX: 34.13 KG/M2 | HEART RATE: 54 BPM | WEIGHT: 238.4 LBS

## 2018-04-03 DIAGNOSIS — L02.91 ABSCESS: Primary | ICD-10-CM

## 2018-04-03 PROBLEM — I50.32 CHRONIC DIASTOLIC HEART FAILURE (HCC): Status: ACTIVE | Noted: 2017-11-17

## 2018-04-03 PROBLEM — E66.8 MODERATE OBESITY: Status: ACTIVE | Noted: 2017-11-17

## 2018-04-03 PROCEDURE — 99213 OFFICE O/P EST LOW 20 MIN: CPT | Performed by: PHYSICIAN ASSISTANT

## 2018-04-03 RX ORDER — ARMODAFINIL 200 MG/1
1 TABLET ORAL 2 TIMES DAILY
COMMUNITY
Start: 2018-04-02

## 2018-04-03 RX ORDER — DOXYCYCLINE HYCLATE 100 MG
100 TABLET ORAL 2 TIMES DAILY
Qty: 10 TABLET | Refills: 0 | Status: SHIPPED | OUTPATIENT
Start: 2018-04-03 | End: 2018-04-06 | Stop reason: SDUPTHER

## 2018-04-03 ASSESSMENT — ENCOUNTER SYMPTOMS
COUGH: 0
BACK PAIN: 0
RHINORRHEA: 0
VOMITING: 0
NAUSEA: 0
SHORTNESS OF BREATH: 0

## 2018-04-06 ENCOUNTER — HOSPITAL ENCOUNTER (OUTPATIENT)
Dept: GENERAL RADIOLOGY | Age: 73
Discharge: HOME | End: 2018-04-08
Payer: MEDICARE

## 2018-04-06 ENCOUNTER — HOSPITAL ENCOUNTER (OUTPATIENT)
Age: 73
Discharge: HOME | End: 2018-04-06
Payer: MEDICARE

## 2018-04-06 ENCOUNTER — HOSPITAL ENCOUNTER (OUTPATIENT)
Age: 73
Discharge: HOME | End: 2018-04-08
Payer: MEDICARE

## 2018-04-06 DIAGNOSIS — Z12.5 SPECIAL SCREENING FOR MALIGNANT NEOPLASM OF PROSTATE: ICD-10-CM

## 2018-04-06 DIAGNOSIS — L02.91 ABSCESS: ICD-10-CM

## 2018-04-06 DIAGNOSIS — J44.9 CHRONIC OBSTRUCTIVE PULMONARY DISEASE, UNSPECIFIED COPD TYPE (HCC): ICD-10-CM

## 2018-04-06 LAB — PROSTATE SPECIFIC ANTIGEN: 0.35 UG/L

## 2018-04-06 PROCEDURE — 36415 COLL VENOUS BLD VENIPUNCTURE: CPT

## 2018-04-06 PROCEDURE — G0103 PSA SCREENING: HCPCS

## 2018-04-06 PROCEDURE — 71046 X-RAY EXAM CHEST 2 VIEWS: CPT

## 2018-04-09 RX ORDER — CETIRIZINE HYDROCHLORIDE 10 MG/1
TABLET ORAL
Qty: 28 TABLET | Refills: 0 | Status: SHIPPED | OUTPATIENT
Start: 2018-04-09 | End: 2018-05-19 | Stop reason: SDUPTHER

## 2018-04-09 RX ORDER — CYCLOBENZAPRINE HCL 10 MG
TABLET ORAL
Qty: 28 TABLET | Refills: 0 | Status: SHIPPED | OUTPATIENT
Start: 2018-04-09 | End: 2018-05-19 | Stop reason: SDUPTHER

## 2018-04-09 RX ORDER — OMEPRAZOLE 20 MG/1
CAPSULE, DELAYED RELEASE ORAL
Qty: 28 CAPSULE | Refills: 1 | Status: ON HOLD | OUTPATIENT
Start: 2018-04-09 | End: 2018-05-26 | Stop reason: HOSPADM

## 2018-04-09 RX ORDER — DOXYCYCLINE HYCLATE 100 MG
TABLET ORAL
Qty: 10 TABLET | Refills: 0 | Status: ON HOLD | OUTPATIENT
Start: 2018-04-09 | End: 2018-05-25

## 2018-04-10 ENCOUNTER — OFFICE VISIT (OUTPATIENT)
Dept: FAMILY MEDICINE CLINIC | Age: 73
End: 2018-04-10
Payer: MEDICARE

## 2018-04-10 VITALS
BODY MASS INDEX: 34.79 KG/M2 | DIASTOLIC BLOOD PRESSURE: 57 MMHG | WEIGHT: 243 LBS | HEIGHT: 70 IN | HEART RATE: 55 BPM | SYSTOLIC BLOOD PRESSURE: 86 MMHG

## 2018-04-10 DIAGNOSIS — L02.212 ABSCESS OF SCAPULAR REGION: Primary | ICD-10-CM

## 2018-04-10 DIAGNOSIS — I50.32 CHRONIC DIASTOLIC HEART FAILURE (HCC): ICD-10-CM

## 2018-04-10 DIAGNOSIS — R39.9 UTI SYMPTOMS: ICD-10-CM

## 2018-04-10 PROBLEM — J41.0 SIMPLE CHRONIC BRONCHITIS (HCC): Status: ACTIVE | Noted: 2018-04-10

## 2018-04-10 LAB
BILIRUBIN, POC: NEGATIVE
BLOOD URINE, POC: ABNORMAL
CLARITY, POC: CLEAR
COLOR, POC: YELLOW
GLUCOSE URINE, POC: NEGATIVE
KETONES, POC: NEGATIVE
LEUKOCYTE EST, POC: NEGATIVE
NITRITE, POC: NEGATIVE
PH, POC: 6
PROTEIN, POC: NEGATIVE
SPECIFIC GRAVITY, POC: 1.01
UROBILINOGEN, POC: NEGATIVE

## 2018-04-10 PROCEDURE — 1036F TOBACCO NON-USER: CPT | Performed by: PHYSICIAN ASSISTANT

## 2018-04-10 PROCEDURE — 99214 OFFICE O/P EST MOD 30 MIN: CPT | Performed by: PHYSICIAN ASSISTANT

## 2018-04-10 PROCEDURE — G8427 DOCREV CUR MEDS BY ELIG CLIN: HCPCS | Performed by: PHYSICIAN ASSISTANT

## 2018-04-10 PROCEDURE — 1123F ACP DISCUSS/DSCN MKR DOCD: CPT | Performed by: PHYSICIAN ASSISTANT

## 2018-04-10 PROCEDURE — 81002 URINALYSIS NONAUTO W/O SCOPE: CPT | Performed by: PHYSICIAN ASSISTANT

## 2018-04-10 PROCEDURE — G8417 CALC BMI ABV UP PARAM F/U: HCPCS | Performed by: PHYSICIAN ASSISTANT

## 2018-04-10 PROCEDURE — 10060 I&D ABSCESS SIMPLE/SINGLE: CPT | Performed by: PHYSICIAN ASSISTANT

## 2018-04-10 PROCEDURE — 3017F COLORECTAL CA SCREEN DOC REV: CPT | Performed by: PHYSICIAN ASSISTANT

## 2018-04-10 PROCEDURE — 4040F PNEUMOC VAC/ADMIN/RCVD: CPT | Performed by: PHYSICIAN ASSISTANT

## 2018-04-10 RX ORDER — METOPROLOL SUCCINATE 50 MG/1
TABLET, EXTENDED RELEASE ORAL
Qty: 30 TABLET | Refills: 3 | Status: SHIPPED | OUTPATIENT
Start: 2018-04-10 | End: 2018-05-22 | Stop reason: DRUGHIGH

## 2018-04-11 ASSESSMENT — ENCOUNTER SYMPTOMS
NAUSEA: 0
BACK PAIN: 0
VOMITING: 0
SHORTNESS OF BREATH: 0
ABDOMINAL PAIN: 0

## 2018-04-30 ENCOUNTER — TELEPHONE (OUTPATIENT)
Dept: FAMILY MEDICINE CLINIC | Age: 73
End: 2018-04-30

## 2018-05-02 ENCOUNTER — OFFICE VISIT (OUTPATIENT)
Dept: FAMILY MEDICINE CLINIC | Age: 73
End: 2018-05-02
Payer: MEDICARE

## 2018-05-02 VITALS
HEIGHT: 70 IN | DIASTOLIC BLOOD PRESSURE: 67 MMHG | SYSTOLIC BLOOD PRESSURE: 111 MMHG | TEMPERATURE: 97.9 F | WEIGHT: 247 LBS | OXYGEN SATURATION: 96 % | BODY MASS INDEX: 35.36 KG/M2 | HEART RATE: 73 BPM

## 2018-05-02 DIAGNOSIS — I89.0 LYMPHEDEMA OF BOTH LOWER EXTREMITIES: ICD-10-CM

## 2018-05-02 DIAGNOSIS — J41.0 SIMPLE CHRONIC BRONCHITIS (HCC): Primary | ICD-10-CM

## 2018-05-02 DIAGNOSIS — I10 ESSENTIAL HYPERTENSION: ICD-10-CM

## 2018-05-02 DIAGNOSIS — R26.9 GAIT DISORDER: ICD-10-CM

## 2018-05-02 DIAGNOSIS — K21.9 GASTROESOPHAGEAL REFLUX DISEASE, ESOPHAGITIS PRESENCE NOT SPECIFIED: ICD-10-CM

## 2018-05-02 DIAGNOSIS — M21.371 FOOT DROP, RIGHT: ICD-10-CM

## 2018-05-02 DIAGNOSIS — E66.8 MODERATE OBESITY: ICD-10-CM

## 2018-05-02 PROCEDURE — G8427 DOCREV CUR MEDS BY ELIG CLIN: HCPCS | Performed by: PHYSICIAN ASSISTANT

## 2018-05-02 PROCEDURE — G8926 SPIRO NO PERF OR DOC: HCPCS | Performed by: PHYSICIAN ASSISTANT

## 2018-05-02 PROCEDURE — 4040F PNEUMOC VAC/ADMIN/RCVD: CPT | Performed by: PHYSICIAN ASSISTANT

## 2018-05-02 PROCEDURE — G8417 CALC BMI ABV UP PARAM F/U: HCPCS | Performed by: PHYSICIAN ASSISTANT

## 2018-05-02 PROCEDURE — 1036F TOBACCO NON-USER: CPT | Performed by: PHYSICIAN ASSISTANT

## 2018-05-02 PROCEDURE — 99214 OFFICE O/P EST MOD 30 MIN: CPT | Performed by: PHYSICIAN ASSISTANT

## 2018-05-02 PROCEDURE — 3017F COLORECTAL CA SCREEN DOC REV: CPT | Performed by: PHYSICIAN ASSISTANT

## 2018-05-02 PROCEDURE — 3023F SPIROM DOC REV: CPT | Performed by: PHYSICIAN ASSISTANT

## 2018-05-02 PROCEDURE — 1123F ACP DISCUSS/DSCN MKR DOCD: CPT | Performed by: PHYSICIAN ASSISTANT

## 2018-05-02 RX ORDER — LEVOFLOXACIN 500 MG/1
500 TABLET, FILM COATED ORAL EVERY EVENING
Refills: 0 | Status: ON HOLD | COMMUNITY
Start: 2018-04-29 | End: 2018-05-25

## 2018-05-02 RX ORDER — PREDNISONE 20 MG/1
20 TABLET ORAL 2 TIMES DAILY
Refills: 0 | Status: ON HOLD | COMMUNITY
Start: 2018-04-29 | End: 2018-05-25

## 2018-05-02 RX ORDER — IPRATROPIUM BROMIDE AND ALBUTEROL SULFATE 2.5; .5 MG/3ML; MG/3ML
1 SOLUTION RESPIRATORY (INHALATION) EVERY 4 HOURS PRN
COMMUNITY

## 2018-05-02 ASSESSMENT — ENCOUNTER SYMPTOMS
SHORTNESS OF BREATH: 0
WHEEZING: 0
DIARRHEA: 0
CONSTIPATION: 0
SPUTUM PRODUCTION: 1
NAUSEA: 1
BACK PAIN: 1
VOMITING: 1
COUGH: 1

## 2018-05-22 ENCOUNTER — OFFICE VISIT (OUTPATIENT)
Dept: FAMILY MEDICINE CLINIC | Age: 73
End: 2018-05-22
Payer: MEDICARE

## 2018-05-22 VITALS
WEIGHT: 242 LBS | HEART RATE: 89 BPM | BODY MASS INDEX: 34.65 KG/M2 | TEMPERATURE: 96.4 F | HEIGHT: 70 IN | SYSTOLIC BLOOD PRESSURE: 109 MMHG | DIASTOLIC BLOOD PRESSURE: 74 MMHG

## 2018-05-22 DIAGNOSIS — I10 ESSENTIAL HYPERTENSION: ICD-10-CM

## 2018-05-22 DIAGNOSIS — Z99.89 OSA ON CPAP: ICD-10-CM

## 2018-05-22 DIAGNOSIS — K21.9 GASTROESOPHAGEAL REFLUX DISEASE, ESOPHAGITIS PRESENCE NOT SPECIFIED: ICD-10-CM

## 2018-05-22 DIAGNOSIS — E66.8 MODERATE OBESITY: ICD-10-CM

## 2018-05-22 DIAGNOSIS — I50.32 CHRONIC DIASTOLIC HEART FAILURE (HCC): ICD-10-CM

## 2018-05-22 DIAGNOSIS — I48.20 CHRONIC ATRIAL FIBRILLATION (HCC): ICD-10-CM

## 2018-05-22 DIAGNOSIS — G47.33 OSA ON CPAP: ICD-10-CM

## 2018-05-22 DIAGNOSIS — R10.84 GENERALIZED ABDOMINAL PAIN: Primary | ICD-10-CM

## 2018-05-22 DIAGNOSIS — K52.9 ACUTE GASTROENTERITIS: ICD-10-CM

## 2018-05-22 PROCEDURE — G8417 CALC BMI ABV UP PARAM F/U: HCPCS | Performed by: FAMILY MEDICINE

## 2018-05-22 PROCEDURE — 99213 OFFICE O/P EST LOW 20 MIN: CPT | Performed by: FAMILY MEDICINE

## 2018-05-22 PROCEDURE — 4040F PNEUMOC VAC/ADMIN/RCVD: CPT | Performed by: FAMILY MEDICINE

## 2018-05-22 PROCEDURE — G8427 DOCREV CUR MEDS BY ELIG CLIN: HCPCS | Performed by: FAMILY MEDICINE

## 2018-05-22 PROCEDURE — 1036F TOBACCO NON-USER: CPT | Performed by: FAMILY MEDICINE

## 2018-05-22 PROCEDURE — 1123F ACP DISCUSS/DSCN MKR DOCD: CPT | Performed by: FAMILY MEDICINE

## 2018-05-22 PROCEDURE — 3017F COLORECTAL CA SCREEN DOC REV: CPT | Performed by: FAMILY MEDICINE

## 2018-05-22 RX ORDER — METOPROLOL SUCCINATE 50 MG/1
TABLET, EXTENDED RELEASE ORAL
COMMUNITY
Start: 2018-04-27 | End: 2018-05-22 | Stop reason: DRUGHIGH

## 2018-05-22 RX ORDER — CETIRIZINE HYDROCHLORIDE 10 MG/1
TABLET ORAL
Qty: 28 TABLET | Refills: 2 | Status: SHIPPED | OUTPATIENT
Start: 2018-05-22 | End: 2018-06-20 | Stop reason: SDUPTHER

## 2018-05-22 RX ORDER — ONDANSETRON 4 MG/1
4 TABLET, ORALLY DISINTEGRATING ORAL
COMMUNITY
Start: 2018-04-29 | End: 2019-03-22 | Stop reason: SDUPTHER

## 2018-05-22 RX ORDER — METOPROLOL SUCCINATE 100 MG/1
TABLET, EXTENDED RELEASE ORAL
COMMUNITY
Start: 2018-04-27 | End: 2018-06-01 | Stop reason: DRUGHIGH

## 2018-05-22 RX ORDER — SPIRONOLACTONE 25 MG/1
TABLET ORAL
Qty: 28 TABLET | Refills: 2 | Status: SHIPPED | OUTPATIENT
Start: 2018-05-22 | End: 2018-06-20 | Stop reason: SDUPTHER

## 2018-05-22 RX ORDER — CYCLOBENZAPRINE HCL 10 MG
TABLET ORAL
Qty: 28 TABLET | Refills: 2 | Status: SHIPPED | OUTPATIENT
Start: 2018-05-22 | End: 2018-06-20 | Stop reason: SDUPTHER

## 2018-05-22 RX ORDER — HYDROCHLOROTHIAZIDE 25 MG/1
TABLET ORAL
Qty: 28 TABLET | Refills: 2 | Status: ON HOLD | OUTPATIENT
Start: 2018-05-22 | End: 2018-05-26 | Stop reason: HOSPADM

## 2018-05-22 ASSESSMENT — ENCOUNTER SYMPTOMS
ABDOMINAL DISTENTION: 1
SHORTNESS OF BREATH: 0
TROUBLE SWALLOWING: 0
CHEST TIGHTNESS: 0
COUGH: 0
NAUSEA: 1
VOMITING: 1
RHINORRHEA: 0
ABDOMINAL PAIN: 0
BACK PAIN: 0
DIARRHEA: 1
CONSTIPATION: 0
SORE THROAT: 0

## 2018-05-22 ASSESSMENT — PATIENT HEALTH QUESTIONNAIRE - PHQ9
1. LITTLE INTEREST OR PLEASURE IN DOING THINGS: 0
SUM OF ALL RESPONSES TO PHQ9 QUESTIONS 1 & 2: 0
SUM OF ALL RESPONSES TO PHQ9 QUESTIONS 1 & 2: 0
SUM OF ALL RESPONSES TO PHQ QUESTIONS 1-9: 0
2. FEELING DOWN, DEPRESSED OR HOPELESS: 0
1. LITTLE INTEREST OR PLEASURE IN DOING THINGS: 0
SUM OF ALL RESPONSES TO PHQ QUESTIONS 1-9: 0
2. FEELING DOWN, DEPRESSED OR HOPELESS: 0

## 2018-05-24 ENCOUNTER — APPOINTMENT (OUTPATIENT)
Dept: CT IMAGING | Age: 73
End: 2018-05-24
Payer: MEDICARE

## 2018-05-24 ENCOUNTER — HOSPITAL ENCOUNTER (EMERGENCY)
Age: 73
Discharge: ANOTHER HOSPITAL | End: 2018-05-25
Attending: EMERGENCY MEDICINE
Payer: MEDICARE

## 2018-05-24 DIAGNOSIS — R10.84 GENERALIZED ABDOMINAL PAIN: Primary | ICD-10-CM

## 2018-05-24 DIAGNOSIS — Z98.84 HISTORY OF GASTRIC BYPASS: ICD-10-CM

## 2018-05-24 DIAGNOSIS — K80.20 CALCULUS OF GALLBLADDER WITHOUT CHOLECYSTITIS WITHOUT OBSTRUCTION: ICD-10-CM

## 2018-05-24 DIAGNOSIS — K56.7 ILEUS (HCC): ICD-10-CM

## 2018-05-24 LAB
-: NORMAL
ABSOLUTE EOS #: 0.2 K/UL (ref 0–0.4)
ABSOLUTE IMMATURE GRANULOCYTE: ABNORMAL K/UL (ref 0–0.3)
ABSOLUTE LYMPH #: 1.2 K/UL (ref 1–4.8)
ABSOLUTE MONO #: 0.9 K/UL (ref 0.2–0.8)
ALBUMIN SERPL-MCNC: 3.9 G/DL (ref 3.5–5.2)
ALBUMIN/GLOBULIN RATIO: NORMAL (ref 1–2.5)
ALP BLD-CCNC: 88 U/L (ref 40–129)
ALT SERPL-CCNC: 14 U/L (ref 5–41)
AMORPHOUS: NORMAL
AMYLASE: 30 U/L (ref 28–100)
ANION GAP SERPL CALCULATED.3IONS-SCNC: 16 MMOL/L (ref 9–17)
AST SERPL-CCNC: 24 U/L
BACTERIA: NORMAL
BASOPHILS # BLD: 0 % (ref 0–2)
BASOPHILS ABSOLUTE: 0 K/UL (ref 0–0.2)
BILIRUB SERPL-MCNC: 0.7 MG/DL (ref 0.3–1.2)
BILIRUBIN DIRECT: 0.3 MG/DL
BILIRUBIN URINE: NEGATIVE
BILIRUBIN, INDIRECT: 0.4 MG/DL (ref 0–1)
BUN BLDV-MCNC: 13 MG/DL (ref 8–23)
BUN/CREAT BLD: 12 (ref 9–20)
CALCIUM SERPL-MCNC: 9.2 MG/DL (ref 8.6–10.4)
CASTS UA: NORMAL /LPF
CHLORIDE BLD-SCNC: 92 MMOL/L (ref 98–107)
CO2: 24 MMOL/L (ref 20–31)
COLOR: YELLOW
COMMENT UA: ABNORMAL
CREAT SERPL-MCNC: 1.1 MG/DL (ref 0.7–1.2)
CRYSTALS, UA: NORMAL /HPF
DIFFERENTIAL TYPE: ABNORMAL
EOSINOPHILS RELATIVE PERCENT: 2 % (ref 1–4)
EPITHELIAL CELLS UA: NORMAL /HPF (ref 0–5)
GFR AFRICAN AMERICAN: >60 ML/MIN
GFR NON-AFRICAN AMERICAN: >60 ML/MIN
GFR SERPL CREATININE-BSD FRML MDRD: ABNORMAL ML/MIN/{1.73_M2}
GFR SERPL CREATININE-BSD FRML MDRD: ABNORMAL ML/MIN/{1.73_M2}
GLOBULIN: NORMAL G/DL (ref 1.5–3.8)
GLUCOSE BLD-MCNC: 110 MG/DL (ref 70–99)
GLUCOSE URINE: NEGATIVE
HCT VFR BLD CALC: 41.4 % (ref 41–53)
HEMOGLOBIN: 13.6 G/DL (ref 13.5–17.5)
IMMATURE GRANULOCYTES: ABNORMAL %
KETONES, URINE: NEGATIVE
LACTIC ACID: 1.4 MMOL/L (ref 0.5–2.2)
LEUKOCYTE ESTERASE, URINE: NEGATIVE
LIPASE: 33 U/L (ref 13–60)
LYMPHOCYTES # BLD: 15 % (ref 24–44)
MAGNESIUM: 1.8 MG/DL (ref 1.6–2.6)
MCH RBC QN AUTO: 29.6 PG (ref 26–34)
MCHC RBC AUTO-ENTMCNC: 32.7 G/DL (ref 31–37)
MCV RBC AUTO: 90.6 FL (ref 80–100)
MONOCYTES # BLD: 11 % (ref 1–7)
MUCUS: NORMAL
NITRITE, URINE: NEGATIVE
NRBC AUTOMATED: ABNORMAL PER 100 WBC
OTHER OBSERVATIONS UA: NORMAL
PDW BLD-RTO: 17.9 % (ref 11.5–14.5)
PH UA: 6 (ref 5–8)
PLATELET # BLD: 383 K/UL (ref 130–400)
PLATELET ESTIMATE: ABNORMAL
PMV BLD AUTO: 6.2 FL (ref 6–12)
POTASSIUM SERPL-SCNC: 3.5 MMOL/L (ref 3.7–5.3)
PROTEIN UA: NEGATIVE
RBC # BLD: 4.57 M/UL (ref 4.5–5.9)
RBC # BLD: ABNORMAL 10*6/UL
RBC UA: NORMAL /HPF (ref 0–2)
RENAL EPITHELIAL, UA: NORMAL /HPF
SEG NEUTROPHILS: 72 % (ref 36–66)
SEGMENTED NEUTROPHILS ABSOLUTE COUNT: 6.2 K/UL (ref 1.8–7.7)
SODIUM BLD-SCNC: 132 MMOL/L (ref 135–144)
SPECIFIC GRAVITY UA: 1.02 (ref 1–1.03)
TOTAL PROTEIN: 7.2 G/DL (ref 6.4–8.3)
TRICHOMONAS: NORMAL
TURBIDITY: CLEAR
URINE HGB: ABNORMAL
UROBILINOGEN, URINE: NORMAL
WBC # BLD: 8.6 K/UL (ref 3.5–11)
WBC # BLD: ABNORMAL 10*3/UL
WBC UA: NORMAL /HPF (ref 0–5)
YEAST: NORMAL

## 2018-05-24 PROCEDURE — 2580000003 HC RX 258: Performed by: NURSE PRACTITIONER

## 2018-05-24 PROCEDURE — 82150 ASSAY OF AMYLASE: CPT

## 2018-05-24 PROCEDURE — 74177 CT ABD & PELVIS W/CONTRAST: CPT

## 2018-05-24 PROCEDURE — 6360000002 HC RX W HCPCS: Performed by: NURSE PRACTITIONER

## 2018-05-24 PROCEDURE — 83735 ASSAY OF MAGNESIUM: CPT

## 2018-05-24 PROCEDURE — 6360000004 HC RX CONTRAST MEDICATION: Performed by: NURSE PRACTITIONER

## 2018-05-24 PROCEDURE — 99285 EMERGENCY DEPT VISIT HI MDM: CPT

## 2018-05-24 PROCEDURE — 80076 HEPATIC FUNCTION PANEL: CPT

## 2018-05-24 PROCEDURE — 85025 COMPLETE CBC W/AUTO DIFF WBC: CPT

## 2018-05-24 PROCEDURE — 80048 BASIC METABOLIC PNL TOTAL CA: CPT

## 2018-05-24 PROCEDURE — 81001 URINALYSIS AUTO W/SCOPE: CPT

## 2018-05-24 PROCEDURE — 83605 ASSAY OF LACTIC ACID: CPT

## 2018-05-24 PROCEDURE — 83690 ASSAY OF LIPASE: CPT

## 2018-05-24 PROCEDURE — 96374 THER/PROPH/DIAG INJ IV PUSH: CPT

## 2018-05-24 RX ORDER — SODIUM CHLORIDE 9 MG/ML
INJECTION, SOLUTION INTRAVENOUS CONTINUOUS
Status: DISCONTINUED | OUTPATIENT
Start: 2018-05-24 | End: 2018-05-25 | Stop reason: HOSPADM

## 2018-05-24 RX ORDER — 0.9 % SODIUM CHLORIDE 0.9 %
80 INTRAVENOUS SOLUTION INTRAVENOUS ONCE
Status: COMPLETED | OUTPATIENT
Start: 2018-05-24 | End: 2018-05-24

## 2018-05-24 RX ORDER — ONDANSETRON 2 MG/ML
4 INJECTION INTRAMUSCULAR; INTRAVENOUS ONCE
Status: COMPLETED | OUTPATIENT
Start: 2018-05-24 | End: 2018-05-24

## 2018-05-24 RX ORDER — SODIUM CHLORIDE 0.9 % (FLUSH) 0.9 %
10 SYRINGE (ML) INJECTION PRN
Status: DISCONTINUED | OUTPATIENT
Start: 2018-05-24 | End: 2018-05-25 | Stop reason: HOSPADM

## 2018-05-24 RX ADMIN — SODIUM CHLORIDE 80 ML: 9 INJECTION, SOLUTION INTRAVENOUS at 20:27

## 2018-05-24 RX ADMIN — SODIUM CHLORIDE: 9 INJECTION, SOLUTION INTRAVENOUS at 19:50

## 2018-05-24 RX ADMIN — ONDANSETRON 4 MG: 2 INJECTION INTRAMUSCULAR; INTRAVENOUS at 19:52

## 2018-05-24 RX ADMIN — Medication 10 ML: at 20:27

## 2018-05-24 RX ADMIN — IOPAMIDOL 80 ML: 755 INJECTION, SOLUTION INTRAVENOUS at 20:27

## 2018-05-24 ASSESSMENT — PAIN SCALES - GENERAL: PAINLEVEL_OUTOF10: 7

## 2018-05-25 ENCOUNTER — ANESTHESIA EVENT (OUTPATIENT)
Dept: ENDOSCOPY | Age: 73
DRG: 392 | End: 2018-05-25
Payer: MEDICARE

## 2018-05-25 ENCOUNTER — APPOINTMENT (OUTPATIENT)
Dept: GENERAL RADIOLOGY | Age: 73
DRG: 392 | End: 2018-05-25
Attending: FAMILY MEDICINE
Payer: MEDICARE

## 2018-05-25 ENCOUNTER — HOSPITAL ENCOUNTER (INPATIENT)
Age: 73
LOS: 1 days | Discharge: HOME | DRG: 392 | End: 2018-05-26
Attending: FAMILY MEDICINE | Admitting: FAMILY MEDICINE
Payer: MEDICARE

## 2018-05-25 ENCOUNTER — ANESTHESIA (OUTPATIENT)
Dept: ENDOSCOPY | Age: 73
DRG: 392 | End: 2018-05-25
Payer: MEDICARE

## 2018-05-25 VITALS
TEMPERATURE: 97.2 F | DIASTOLIC BLOOD PRESSURE: 65 MMHG | WEIGHT: 242 LBS | OXYGEN SATURATION: 100 % | RESPIRATION RATE: 14 BRPM | BODY MASS INDEX: 34.65 KG/M2 | HEART RATE: 69 BPM | SYSTOLIC BLOOD PRESSURE: 119 MMHG | HEIGHT: 70 IN

## 2018-05-25 VITALS
OXYGEN SATURATION: 97 % | DIASTOLIC BLOOD PRESSURE: 42 MMHG | RESPIRATION RATE: 11 BRPM | SYSTOLIC BLOOD PRESSURE: 88 MMHG

## 2018-05-25 PROBLEM — K56.7 ILEUS (HCC): Status: ACTIVE | Noted: 2018-05-25

## 2018-05-25 LAB
MAGNESIUM: 1.9 MG/DL (ref 1.6–2.6)
POTASSIUM SERPL-SCNC: 3.1 MMOL/L (ref 3.7–5.3)

## 2018-05-25 PROCEDURE — 1200000000 HC SEMI PRIVATE

## 2018-05-25 PROCEDURE — 6360000002 HC RX W HCPCS: Performed by: SPECIALIST

## 2018-05-25 PROCEDURE — 99223 1ST HOSP IP/OBS HIGH 75: CPT | Performed by: FAMILY MEDICINE

## 2018-05-25 PROCEDURE — 94664 DEMO&/EVAL PT USE INHALER: CPT

## 2018-05-25 PROCEDURE — 88305 TISSUE EXAM BY PATHOLOGIST: CPT

## 2018-05-25 PROCEDURE — 84132 ASSAY OF SERUM POTASSIUM: CPT

## 2018-05-25 PROCEDURE — 87493 C DIFF AMPLIFIED PROBE: CPT

## 2018-05-25 PROCEDURE — 74250 X-RAY XM SM INT 1CNTRST STD: CPT

## 2018-05-25 PROCEDURE — 3700000000 HC ANESTHESIA ATTENDED CARE: Performed by: SURGERY

## 2018-05-25 PROCEDURE — 6370000000 HC RX 637 (ALT 250 FOR IP): Performed by: FAMILY MEDICINE

## 2018-05-25 PROCEDURE — 2580000003 HC RX 258: Performed by: SPECIALIST

## 2018-05-25 PROCEDURE — 83735 ASSAY OF MAGNESIUM: CPT

## 2018-05-25 PROCEDURE — 94640 AIRWAY INHALATION TREATMENT: CPT

## 2018-05-25 PROCEDURE — 36415 COLL VENOUS BLD VENIPUNCTURE: CPT

## 2018-05-25 PROCEDURE — 3700000001 HC ADD 15 MINUTES (ANESTHESIA): Performed by: SURGERY

## 2018-05-25 PROCEDURE — 7100000011 HC PHASE II RECOVERY - ADDTL 15 MIN: Performed by: SURGERY

## 2018-05-25 PROCEDURE — 43239 EGD BIOPSY SINGLE/MULTIPLE: CPT | Performed by: SURGERY

## 2018-05-25 PROCEDURE — 0DB68ZX EXCISION OF STOMACH, VIA NATURAL OR ARTIFICIAL OPENING ENDOSCOPIC, DIAGNOSTIC: ICD-10-PCS | Performed by: SURGERY

## 2018-05-25 PROCEDURE — 2580000003 HC RX 258: Performed by: NURSE PRACTITIONER

## 2018-05-25 PROCEDURE — 7100000010 HC PHASE II RECOVERY - FIRST 15 MIN: Performed by: SURGERY

## 2018-05-25 PROCEDURE — 6360000002 HC RX W HCPCS: Performed by: NURSE PRACTITIONER

## 2018-05-25 PROCEDURE — 2500000003 HC RX 250 WO HCPCS: Performed by: SPECIALIST

## 2018-05-25 PROCEDURE — 6360000002 HC RX W HCPCS: Performed by: FAMILY MEDICINE

## 2018-05-25 PROCEDURE — 3609012400 HC EGD TRANSORAL BIOPSY SINGLE/MULTIPLE: Performed by: SURGERY

## 2018-05-25 PROCEDURE — 96376 TX/PRO/DX INJ SAME DRUG ADON: CPT

## 2018-05-25 RX ORDER — POTASSIUM CHLORIDE 7.45 MG/ML
10 INJECTION INTRAVENOUS PRN
Status: DISCONTINUED | OUTPATIENT
Start: 2018-05-25 | End: 2018-05-26 | Stop reason: HOSPADM

## 2018-05-25 RX ORDER — SODIUM CHLORIDE 9 MG/ML
INJECTION, SOLUTION INTRAVENOUS CONTINUOUS
Status: DISCONTINUED | OUTPATIENT
Start: 2018-05-25 | End: 2018-05-26 | Stop reason: HOSPADM

## 2018-05-25 RX ORDER — MAGNESIUM SULFATE 1 G/100ML
1 INJECTION INTRAVENOUS PRN
Status: DISCONTINUED | OUTPATIENT
Start: 2018-05-25 | End: 2018-05-26 | Stop reason: HOSPADM

## 2018-05-25 RX ORDER — POTASSIUM CHLORIDE 20 MEQ/1
40 TABLET, EXTENDED RELEASE ORAL PRN
Status: DISCONTINUED | OUTPATIENT
Start: 2018-05-25 | End: 2018-05-26 | Stop reason: HOSPADM

## 2018-05-25 RX ORDER — SUCRALFATE 1 G/1
1 TABLET ORAL EVERY 6 HOURS SCHEDULED
Status: DISCONTINUED | OUTPATIENT
Start: 2018-05-25 | End: 2018-05-26 | Stop reason: HOSPADM

## 2018-05-25 RX ORDER — ONDANSETRON 2 MG/ML
4 INJECTION INTRAMUSCULAR; INTRAVENOUS EVERY 6 HOURS PRN
Status: DISCONTINUED | OUTPATIENT
Start: 2018-05-25 | End: 2018-05-26 | Stop reason: HOSPADM

## 2018-05-25 RX ORDER — SODIUM CHLORIDE 9 MG/ML
INJECTION, SOLUTION INTRAVENOUS CONTINUOUS PRN
Status: DISCONTINUED | OUTPATIENT
Start: 2018-05-25 | End: 2018-05-25 | Stop reason: SDUPTHER

## 2018-05-25 RX ORDER — METOPROLOL SUCCINATE 100 MG/1
100 TABLET, EXTENDED RELEASE ORAL DAILY
Status: DISCONTINUED | OUTPATIENT
Start: 2018-05-25 | End: 2018-05-26 | Stop reason: HOSPADM

## 2018-05-25 RX ORDER — BUMETANIDE 1 MG/1
1 TABLET ORAL 2 TIMES DAILY
Status: DISCONTINUED | OUTPATIENT
Start: 2018-05-25 | End: 2018-05-26 | Stop reason: HOSPADM

## 2018-05-25 RX ORDER — ALBUTEROL SULFATE 2.5 MG/3ML
2.5 SOLUTION RESPIRATORY (INHALATION) EVERY 4 HOURS PRN
Status: DISCONTINUED | OUTPATIENT
Start: 2018-05-25 | End: 2018-05-26 | Stop reason: HOSPADM

## 2018-05-25 RX ORDER — ONDANSETRON 2 MG/ML
4 INJECTION INTRAMUSCULAR; INTRAVENOUS ONCE
Status: COMPLETED | OUTPATIENT
Start: 2018-05-25 | End: 2018-05-25

## 2018-05-25 RX ORDER — LIDOCAINE HYDROCHLORIDE 10 MG/ML
INJECTION, SOLUTION EPIDURAL; INFILTRATION; INTRACAUDAL; PERINEURAL PRN
Status: DISCONTINUED | OUTPATIENT
Start: 2018-05-25 | End: 2018-05-25 | Stop reason: SDUPTHER

## 2018-05-25 RX ORDER — SODIUM CHLORIDE 0.9 % (FLUSH) 0.9 %
10 SYRINGE (ML) INJECTION EVERY 12 HOURS SCHEDULED
Status: DISCONTINUED | OUTPATIENT
Start: 2018-05-25 | End: 2018-05-26 | Stop reason: HOSPADM

## 2018-05-25 RX ORDER — POTASSIUM CHLORIDE 20MEQ/15ML
40 LIQUID (ML) ORAL PRN
Status: DISCONTINUED | OUTPATIENT
Start: 2018-05-25 | End: 2018-05-26 | Stop reason: HOSPADM

## 2018-05-25 RX ORDER — FENTANYL CITRATE 50 UG/ML
25 INJECTION, SOLUTION INTRAMUSCULAR; INTRAVENOUS ONCE
Status: COMPLETED | OUTPATIENT
Start: 2018-05-25 | End: 2018-05-25

## 2018-05-25 RX ORDER — PROPOFOL 10 MG/ML
INJECTION, EMULSION INTRAVENOUS PRN
Status: DISCONTINUED | OUTPATIENT
Start: 2018-05-25 | End: 2018-05-25 | Stop reason: SDUPTHER

## 2018-05-25 RX ORDER — PANTOPRAZOLE SODIUM 40 MG/1
40 TABLET, DELAYED RELEASE ORAL
Status: DISCONTINUED | OUTPATIENT
Start: 2018-05-26 | End: 2018-05-26 | Stop reason: HOSPADM

## 2018-05-25 RX ORDER — GLYCOPYRROLATE 1 MG/5 ML
SYRINGE (ML) INTRAVENOUS PRN
Status: DISCONTINUED | OUTPATIENT
Start: 2018-05-25 | End: 2018-05-25 | Stop reason: SDUPTHER

## 2018-05-25 RX ORDER — SODIUM CHLORIDE 0.9 % (FLUSH) 0.9 %
10 SYRINGE (ML) INJECTION PRN
Status: DISCONTINUED | OUTPATIENT
Start: 2018-05-25 | End: 2018-05-26 | Stop reason: HOSPADM

## 2018-05-25 RX ORDER — BISACODYL 10 MG
10 SUPPOSITORY, RECTAL RECTAL DAILY PRN
Status: DISCONTINUED | OUTPATIENT
Start: 2018-05-25 | End: 2018-05-26 | Stop reason: HOSPADM

## 2018-05-25 RX ORDER — PROMETHAZINE HYDROCHLORIDE 25 MG/ML
12.5 INJECTION, SOLUTION INTRAMUSCULAR; INTRAVENOUS EVERY 4 HOURS PRN
Status: DISCONTINUED | OUTPATIENT
Start: 2018-05-25 | End: 2018-05-26 | Stop reason: HOSPADM

## 2018-05-25 RX ORDER — GABAPENTIN 300 MG/1
300 CAPSULE ORAL 2 TIMES DAILY
Status: DISCONTINUED | OUTPATIENT
Start: 2018-05-25 | End: 2018-05-26 | Stop reason: HOSPADM

## 2018-05-25 RX ORDER — SPIRONOLACTONE 25 MG/1
25 TABLET ORAL DAILY
Status: DISCONTINUED | OUTPATIENT
Start: 2018-05-25 | End: 2018-05-26 | Stop reason: HOSPADM

## 2018-05-25 RX ORDER — NICOTINE 21 MG/24HR
1 PATCH, TRANSDERMAL 24 HOURS TRANSDERMAL DAILY PRN
Status: DISCONTINUED | OUTPATIENT
Start: 2018-05-25 | End: 2018-05-26 | Stop reason: HOSPADM

## 2018-05-25 RX ADMIN — Medication 0.2 MG: at 10:32

## 2018-05-25 RX ADMIN — PROPOFOL 20 MG: 10 INJECTION, EMULSION INTRAVENOUS at 10:38

## 2018-05-25 RX ADMIN — POTASSIUM CHLORIDE 10 MEQ: 10 INJECTION, SOLUTION INTRAVENOUS at 05:46

## 2018-05-25 RX ADMIN — PROPOFOL 20 MG: 10 INJECTION, EMULSION INTRAVENOUS at 10:43

## 2018-05-25 RX ADMIN — PROPOFOL 10 MG: 10 INJECTION, EMULSION INTRAVENOUS at 10:45

## 2018-05-25 RX ADMIN — ENOXAPARIN SODIUM 40 MG: 40 INJECTION SUBCUTANEOUS at 22:49

## 2018-05-25 RX ADMIN — SODIUM CHLORIDE: 9 INJECTION, SOLUTION INTRAVENOUS at 10:31

## 2018-05-25 RX ADMIN — PHENYLEPHRINE HYDROCHLORIDE 100 MCG: 10 INJECTION INTRAVENOUS at 10:40

## 2018-05-25 RX ADMIN — LIDOCAINE HYDROCHLORIDE 30 MG: 10 INJECTION, SOLUTION EPIDURAL; INFILTRATION; INTRACAUDAL; PERINEURAL at 10:35

## 2018-05-25 RX ADMIN — PROMETHAZINE HYDROCHLORIDE 12.5 MG: 25 INJECTION INTRAMUSCULAR; INTRAVENOUS at 04:01

## 2018-05-25 RX ADMIN — PHENYLEPHRINE HYDROCHLORIDE 100 MCG: 10 INJECTION INTRAVENOUS at 10:45

## 2018-05-25 RX ADMIN — POTASSIUM CHLORIDE 10 MEQ: 10 INJECTION, SOLUTION INTRAVENOUS at 08:20

## 2018-05-25 RX ADMIN — PHENYLEPHRINE HYDROCHLORIDE 100 MCG: 10 INJECTION INTRAVENOUS at 10:38

## 2018-05-25 RX ADMIN — ONDANSETRON 4 MG: 2 INJECTION INTRAMUSCULAR; INTRAVENOUS at 00:56

## 2018-05-25 RX ADMIN — ONDANSETRON 4 MG: 2 INJECTION, SOLUTION INTRAMUSCULAR; INTRAVENOUS at 22:44

## 2018-05-25 RX ADMIN — FENTANYL CITRATE 25 MCG: 50 INJECTION INTRAMUSCULAR; INTRAVENOUS at 04:01

## 2018-05-25 RX ADMIN — MOMETASONE FUROATE AND FORMOTEROL FUMARATE DIHYDRATE 2 PUFF: 200; 5 AEROSOL RESPIRATORY (INHALATION) at 21:21

## 2018-05-25 RX ADMIN — SODIUM CHLORIDE: 9 INJECTION, SOLUTION INTRAVENOUS at 02:50

## 2018-05-25 RX ADMIN — FENTANYL CITRATE 25 MCG: 50 INJECTION INTRAMUSCULAR; INTRAVENOUS at 22:44

## 2018-05-25 RX ADMIN — POTASSIUM CHLORIDE 10 MEQ: 10 INJECTION, SOLUTION INTRAVENOUS at 22:46

## 2018-05-25 RX ADMIN — PROPOFOL 30 MG: 10 INJECTION, EMULSION INTRAVENOUS at 10:35

## 2018-05-25 RX ADMIN — PROPOFOL 20 MG: 10 INJECTION, EMULSION INTRAVENOUS at 10:37

## 2018-05-25 RX ADMIN — POTASSIUM CHLORIDE 10 MEQ: 10 INJECTION, SOLUTION INTRAVENOUS at 07:20

## 2018-05-25 RX ADMIN — PROPOFOL 20 MG: 10 INJECTION, EMULSION INTRAVENOUS at 10:40

## 2018-05-25 ASSESSMENT — ENCOUNTER SYMPTOMS
SINUS PRESSURE: 0
COUGH: 0
SORE THROAT: 0
VOICE CHANGE: 0
CONSTIPATION: 0
VOMITING: 1
WHEEZING: 0
WHEEZING: 0
ABDOMINAL DISTENTION: 1
NAUSEA: 1
DIARRHEA: 0
DIARRHEA: 1
BLOOD IN STOOL: 0
SHORTNESS OF BREATH: 0
SHORTNESS OF BREATH: 0
COLOR CHANGE: 0
RHINORRHEA: 0
VOMITING: 1
ABDOMINAL PAIN: 1
SINUS PRESSURE: 0
CONSTIPATION: 1
ABDOMINAL PAIN: 1
COUGH: 0
SORE THROAT: 0
NAUSEA: 1

## 2018-05-25 ASSESSMENT — PAIN DESCRIPTION - ORIENTATION
ORIENTATION: UPPER;LOWER
ORIENTATION: RIGHT;LEFT;MID
ORIENTATION: UPPER;LOWER

## 2018-05-25 ASSESSMENT — PAIN DESCRIPTION - LOCATION
LOCATION: ABDOMEN

## 2018-05-25 ASSESSMENT — PAIN SCALES - GENERAL
PAINLEVEL_OUTOF10: 0
PAINLEVEL_OUTOF10: 6
PAINLEVEL_OUTOF10: 2
PAINLEVEL_OUTOF10: 5
PAINLEVEL_OUTOF10: 4
PAINLEVEL_OUTOF10: 5
PAINLEVEL_OUTOF10: 0
PAINLEVEL_OUTOF10: 0
PAINLEVEL_OUTOF10: 2

## 2018-05-25 ASSESSMENT — PAIN DESCRIPTION - PAIN TYPE
TYPE: ACUTE PAIN

## 2018-05-25 ASSESSMENT — PAIN DESCRIPTION - FREQUENCY
FREQUENCY: CONTINUOUS

## 2018-05-25 ASSESSMENT — PAIN DESCRIPTION - PROGRESSION
CLINICAL_PROGRESSION: NOT CHANGED
CLINICAL_PROGRESSION: GRADUALLY WORSENING
CLINICAL_PROGRESSION: GRADUALLY IMPROVING

## 2018-05-25 ASSESSMENT — PAIN DESCRIPTION - ONSET
ONSET: ON-GOING

## 2018-05-25 ASSESSMENT — PAIN DESCRIPTION - DESCRIPTORS
DESCRIPTORS: ACHING;DISCOMFORT
DESCRIPTORS: CONSTANT;DISCOMFORT
DESCRIPTORS: DISCOMFORT

## 2018-05-25 ASSESSMENT — PAIN - FUNCTIONAL ASSESSMENT: PAIN_FUNCTIONAL_ASSESSMENT: 0-10

## 2018-05-26 ENCOUNTER — APPOINTMENT (OUTPATIENT)
Dept: GENERAL RADIOLOGY | Age: 73
DRG: 392 | End: 2018-05-26
Attending: FAMILY MEDICINE
Payer: MEDICARE

## 2018-05-26 VITALS
TEMPERATURE: 97.7 F | DIASTOLIC BLOOD PRESSURE: 75 MMHG | HEIGHT: 70 IN | WEIGHT: 236 LBS | OXYGEN SATURATION: 99 % | RESPIRATION RATE: 14 BRPM | HEART RATE: 71 BPM | SYSTOLIC BLOOD PRESSURE: 134 MMHG | BODY MASS INDEX: 33.79 KG/M2

## 2018-05-26 PROBLEM — K29.00 ACUTE SUPERFICIAL GASTRITIS WITHOUT HEMORRHAGE: Status: ACTIVE | Noted: 2018-05-26

## 2018-05-26 PROBLEM — A09 INFECTIOUS ENTERITIS: Status: ACTIVE | Noted: 2018-05-25

## 2018-05-26 PROBLEM — K52.9 ACUTE GASTROENTERITIS: Status: RESOLVED | Noted: 2018-05-22 | Resolved: 2018-05-26

## 2018-05-26 LAB
ALBUMIN SERPL-MCNC: 2.9 G/DL (ref 3.5–5.2)
ALBUMIN/GLOBULIN RATIO: 1 (ref 1–2.5)
ALP BLD-CCNC: 63 U/L (ref 40–129)
ALT SERPL-CCNC: 9 U/L (ref 5–41)
AMYLASE: 15 U/L (ref 28–100)
ANION GAP SERPL CALCULATED.3IONS-SCNC: 14 MMOL/L (ref 9–17)
AST SERPL-CCNC: 24 U/L
BILIRUB SERPL-MCNC: 0.87 MG/DL (ref 0.3–1.2)
BUN BLDV-MCNC: 11 MG/DL (ref 8–23)
BUN/CREAT BLD: ABNORMAL (ref 9–20)
C DIFFICILE TOXINS, PCR: NORMAL
CALCIUM SERPL-MCNC: 8.3 MG/DL (ref 8.6–10.4)
CHLORIDE BLD-SCNC: 97 MMOL/L (ref 98–107)
CO2: 24 MMOL/L (ref 20–31)
CREAT SERPL-MCNC: 0.72 MG/DL (ref 0.7–1.2)
GFR AFRICAN AMERICAN: >60 ML/MIN
GFR NON-AFRICAN AMERICAN: >60 ML/MIN
GFR SERPL CREATININE-BSD FRML MDRD: ABNORMAL ML/MIN/{1.73_M2}
GFR SERPL CREATININE-BSD FRML MDRD: ABNORMAL ML/MIN/{1.73_M2}
GLUCOSE BLD-MCNC: 69 MG/DL (ref 70–99)
HCT VFR BLD CALC: 36.8 % (ref 40.7–50.3)
HEMOGLOBIN: 11.5 G/DL (ref 13–17)
LIPASE: 11 U/L (ref 13–60)
MAGNESIUM: 1.8 MG/DL (ref 1.6–2.6)
MCH RBC QN AUTO: 29.5 PG (ref 25.2–33.5)
MCHC RBC AUTO-ENTMCNC: 31.3 G/DL (ref 28.4–34.8)
MCV RBC AUTO: 94.4 FL (ref 82.6–102.9)
NRBC AUTOMATED: 0 PER 100 WBC
PDW BLD-RTO: 15.9 % (ref 11.8–14.4)
PHOSPHORUS: 3.2 MG/DL (ref 2.5–4.5)
PLATELET # BLD: 200 K/UL (ref 138–453)
PMV BLD AUTO: 8.6 FL (ref 8.1–13.5)
POTASSIUM SERPL-SCNC: 3.9 MMOL/L (ref 3.7–5.3)
RBC # BLD: 3.9 M/UL (ref 4.21–5.77)
SODIUM BLD-SCNC: 135 MMOL/L (ref 135–144)
SPECIMEN DESCRIPTION: NORMAL
TOTAL PROTEIN: 5.9 G/DL (ref 6.4–8.3)
WBC # BLD: 5.3 K/UL (ref 3.5–11.3)

## 2018-05-26 PROCEDURE — 6360000002 HC RX W HCPCS: Performed by: NURSE PRACTITIONER

## 2018-05-26 PROCEDURE — 6370000000 HC RX 637 (ALT 250 FOR IP): Performed by: SURGERY

## 2018-05-26 PROCEDURE — 84100 ASSAY OF PHOSPHORUS: CPT

## 2018-05-26 PROCEDURE — 6370000000 HC RX 637 (ALT 250 FOR IP): Performed by: FAMILY MEDICINE

## 2018-05-26 PROCEDURE — 85027 COMPLETE CBC AUTOMATED: CPT

## 2018-05-26 PROCEDURE — 80053 COMPREHEN METABOLIC PANEL: CPT

## 2018-05-26 PROCEDURE — 74018 RADEX ABDOMEN 1 VIEW: CPT

## 2018-05-26 PROCEDURE — 83735 ASSAY OF MAGNESIUM: CPT

## 2018-05-26 PROCEDURE — 94640 AIRWAY INHALATION TREATMENT: CPT

## 2018-05-26 PROCEDURE — 36415 COLL VENOUS BLD VENIPUNCTURE: CPT

## 2018-05-26 PROCEDURE — 82150 ASSAY OF AMYLASE: CPT

## 2018-05-26 PROCEDURE — 83690 ASSAY OF LIPASE: CPT

## 2018-05-26 PROCEDURE — 6360000002 HC RX W HCPCS: Performed by: FAMILY MEDICINE

## 2018-05-26 PROCEDURE — 99239 HOSP IP/OBS DSCHRG MGMT >30: CPT | Performed by: FAMILY MEDICINE

## 2018-05-26 PROCEDURE — 2580000003 HC RX 258: Performed by: NURSE PRACTITIONER

## 2018-05-26 RX ORDER — PANTOPRAZOLE SODIUM 40 MG/1
40 TABLET, DELAYED RELEASE ORAL
Qty: 30 TABLET | Refills: 3 | Status: SHIPPED | OUTPATIENT
Start: 2018-05-27 | End: 2018-06-01 | Stop reason: SDUPTHER

## 2018-05-26 RX ORDER — SUCRALFATE 1 G/1
1 TABLET ORAL 4 TIMES DAILY
Qty: 120 TABLET | Refills: 0 | Status: SHIPPED | OUTPATIENT
Start: 2018-05-26 | End: 2018-06-01 | Stop reason: SDUPTHER

## 2018-05-26 RX ADMIN — POTASSIUM CHLORIDE 10 MEQ: 10 INJECTION, SOLUTION INTRAVENOUS at 06:15

## 2018-05-26 RX ADMIN — POTASSIUM CHLORIDE 10 MEQ: 10 INJECTION, SOLUTION INTRAVENOUS at 00:13

## 2018-05-26 RX ADMIN — METOPROLOL SUCCINATE 100 MG: 100 TABLET, FILM COATED, EXTENDED RELEASE ORAL at 08:35

## 2018-05-26 RX ADMIN — BUMETANIDE 1 MG: 1 TABLET ORAL at 08:35

## 2018-05-26 RX ADMIN — ENOXAPARIN SODIUM 40 MG: 40 INJECTION SUBCUTANEOUS at 08:35

## 2018-05-26 RX ADMIN — SUCRALFATE 1 G: 1 TABLET ORAL at 01:56

## 2018-05-26 RX ADMIN — SPIRONOLACTONE 25 MG: 25 TABLET ORAL at 08:35

## 2018-05-26 RX ADMIN — GABAPENTIN 300 MG: 300 CAPSULE ORAL at 08:35

## 2018-05-26 RX ADMIN — SUCRALFATE 1 G: 1 TABLET ORAL at 12:41

## 2018-05-26 RX ADMIN — ONDANSETRON 4 MG: 2 INJECTION, SOLUTION INTRAMUSCULAR; INTRAVENOUS at 15:59

## 2018-05-26 RX ADMIN — MOMETASONE FUROATE AND FORMOTEROL FUMARATE DIHYDRATE 2 PUFF: 200; 5 AEROSOL RESPIRATORY (INHALATION) at 08:59

## 2018-05-26 RX ADMIN — PANTOPRAZOLE SODIUM 40 MG: 40 TABLET, DELAYED RELEASE ORAL at 07:23

## 2018-05-26 RX ADMIN — Medication 10 ML: at 15:59

## 2018-05-26 RX ADMIN — SUCRALFATE 1 G: 1 TABLET ORAL at 07:24

## 2018-05-26 RX ADMIN — POTASSIUM CHLORIDE 10 MEQ: 10 INJECTION, SOLUTION INTRAVENOUS at 01:24

## 2018-05-26 ASSESSMENT — ENCOUNTER SYMPTOMS
SINUS PRESSURE: 0
WHEEZING: 0
CONSTIPATION: 0
ABDOMINAL PAIN: 1
BLOOD IN STOOL: 0
COUGH: 0
NAUSEA: 0
SORE THROAT: 0
DIARRHEA: 1
SHORTNESS OF BREATH: 0
VOICE CHANGE: 0
VOMITING: 0

## 2018-05-27 ENCOUNTER — CARE COORDINATION (OUTPATIENT)
Dept: CASE MANAGEMENT | Age: 73
End: 2018-05-27

## 2018-05-27 DIAGNOSIS — K56.7 ILEUS (HCC): Primary | ICD-10-CM

## 2018-05-27 PROCEDURE — 1111F DSCHRG MED/CURRENT MED MERGE: CPT | Performed by: FAMILY MEDICINE

## 2018-05-29 ENCOUNTER — TELEPHONE (OUTPATIENT)
Dept: FAMILY MEDICINE CLINIC | Age: 73
End: 2018-05-29

## 2018-05-29 ENCOUNTER — CARE COORDINATION (OUTPATIENT)
Dept: CASE MANAGEMENT | Age: 73
End: 2018-05-29

## 2018-05-29 LAB — SURGICAL PATHOLOGY REPORT: NORMAL

## 2018-05-30 ENCOUNTER — CARE COORDINATION (OUTPATIENT)
Dept: CARE COORDINATION | Age: 73
End: 2018-05-30

## 2018-05-31 ENCOUNTER — CARE COORDINATION (OUTPATIENT)
Dept: CASE MANAGEMENT | Age: 73
End: 2018-05-31

## 2018-06-01 ENCOUNTER — CARE COORDINATION (OUTPATIENT)
Dept: CASE MANAGEMENT | Age: 73
End: 2018-06-01

## 2018-06-01 ENCOUNTER — OFFICE VISIT (OUTPATIENT)
Dept: FAMILY MEDICINE CLINIC | Age: 73
End: 2018-06-01
Payer: MEDICARE

## 2018-06-01 VITALS
HEART RATE: 61 BPM | WEIGHT: 242 LBS | SYSTOLIC BLOOD PRESSURE: 96 MMHG | DIASTOLIC BLOOD PRESSURE: 64 MMHG | HEIGHT: 70 IN | BODY MASS INDEX: 34.65 KG/M2

## 2018-06-01 DIAGNOSIS — K56.7 ILEUS (HCC): ICD-10-CM

## 2018-06-01 DIAGNOSIS — I48.20 CHRONIC ATRIAL FIBRILLATION (HCC): ICD-10-CM

## 2018-06-01 DIAGNOSIS — Z09 HOSPITAL DISCHARGE FOLLOW-UP: Primary | ICD-10-CM

## 2018-06-01 DIAGNOSIS — I10 ESSENTIAL HYPERTENSION: ICD-10-CM

## 2018-06-01 DIAGNOSIS — K25.3 ACUTE GASTRIC ULCER WITHOUT HEMORRHAGE OR PERFORATION: ICD-10-CM

## 2018-06-01 PROCEDURE — 1111F DSCHRG MED/CURRENT MED MERGE: CPT | Performed by: NURSE PRACTITIONER

## 2018-06-01 PROCEDURE — 99495 TRANSJ CARE MGMT MOD F2F 14D: CPT | Performed by: NURSE PRACTITIONER

## 2018-06-01 RX ORDER — PANTOPRAZOLE SODIUM 40 MG/1
40 TABLET, DELAYED RELEASE ORAL
Qty: 30 TABLET | Refills: 3 | Status: SHIPPED | OUTPATIENT
Start: 2018-06-01 | End: 2018-06-11

## 2018-06-01 RX ORDER — METOPROLOL SUCCINATE 50 MG/1
TABLET, EXTENDED RELEASE ORAL
COMMUNITY
Start: 2018-05-25 | End: 2018-06-01 | Stop reason: SDUPTHER

## 2018-06-01 RX ORDER — METOPROLOL SUCCINATE 50 MG/1
50 TABLET, EXTENDED RELEASE ORAL DAILY
Qty: 30 TABLET | Refills: 3 | Status: SHIPPED | COMMUNITY
Start: 2018-06-01 | End: 2018-08-23 | Stop reason: SDUPTHER

## 2018-06-01 RX ORDER — SUCRALFATE 1 G/1
1 TABLET ORAL 4 TIMES DAILY
Qty: 120 TABLET | Refills: 3 | Status: SHIPPED | OUTPATIENT
Start: 2018-06-01 | End: 2018-06-11

## 2018-06-01 ASSESSMENT — ENCOUNTER SYMPTOMS
SHORTNESS OF BREATH: 0
DIARRHEA: 1
ABDOMINAL PAIN: 1
COUGH: 0

## 2018-06-06 ENCOUNTER — TELEPHONE (OUTPATIENT)
Dept: FAMILY MEDICINE CLINIC | Age: 73
End: 2018-06-06

## 2018-06-06 ENCOUNTER — CARE COORDINATION (OUTPATIENT)
Dept: CARE COORDINATION | Age: 73
End: 2018-06-06

## 2018-06-08 ENCOUNTER — OFFICE VISIT (OUTPATIENT)
Dept: BARIATRICS/WEIGHT MGMT | Age: 73
End: 2018-06-08
Payer: MEDICARE

## 2018-06-08 VITALS
SYSTOLIC BLOOD PRESSURE: 114 MMHG | RESPIRATION RATE: 20 BRPM | BODY MASS INDEX: 33.36 KG/M2 | HEIGHT: 70 IN | WEIGHT: 233 LBS | HEART RATE: 70 BPM | DIASTOLIC BLOOD PRESSURE: 66 MMHG

## 2018-06-08 DIAGNOSIS — K21.9 GASTROESOPHAGEAL REFLUX DISEASE WITHOUT ESOPHAGITIS: Primary | ICD-10-CM

## 2018-06-08 DIAGNOSIS — Z98.84 STATUS POST BARIATRIC SURGERY: ICD-10-CM

## 2018-06-08 PROCEDURE — G8417 CALC BMI ABV UP PARAM F/U: HCPCS | Performed by: SURGERY

## 2018-06-08 PROCEDURE — 3017F COLORECTAL CA SCREEN DOC REV: CPT | Performed by: SURGERY

## 2018-06-08 PROCEDURE — 1123F ACP DISCUSS/DSCN MKR DOCD: CPT | Performed by: SURGERY

## 2018-06-08 PROCEDURE — 99213 OFFICE O/P EST LOW 20 MIN: CPT | Performed by: SURGERY

## 2018-06-08 PROCEDURE — G8428 CUR MEDS NOT DOCUMENT: HCPCS | Performed by: SURGERY

## 2018-06-08 PROCEDURE — 1036F TOBACCO NON-USER: CPT | Performed by: SURGERY

## 2018-06-08 PROCEDURE — 4040F PNEUMOC VAC/ADMIN/RCVD: CPT | Performed by: SURGERY

## 2018-06-08 PROCEDURE — 1111F DSCHRG MED/CURRENT MED MERGE: CPT | Performed by: SURGERY

## 2018-06-11 ENCOUNTER — TELEPHONE (OUTPATIENT)
Dept: BARIATRICS/WEIGHT MGMT | Age: 73
End: 2018-06-11

## 2018-06-11 RX ORDER — PANTOPRAZOLE SODIUM 40 MG/1
40 TABLET, DELAYED RELEASE ORAL DAILY
Qty: 30 TABLET | Refills: 3 | Status: SHIPPED | OUTPATIENT
Start: 2018-06-11 | End: 2018-06-19 | Stop reason: SDUPTHER

## 2018-06-11 RX ORDER — SUCRALFATE 1 G/1
1 TABLET ORAL 4 TIMES DAILY
Qty: 120 TABLET | Refills: 3 | Status: SHIPPED | OUTPATIENT
Start: 2018-06-11 | End: 2018-06-19 | Stop reason: SDUPTHER

## 2018-06-19 ENCOUNTER — TELEPHONE (OUTPATIENT)
Dept: FAMILY MEDICINE CLINIC | Age: 73
End: 2018-06-19

## 2018-06-19 DIAGNOSIS — R82.90 ABNORMAL URINE ODOR: Primary | ICD-10-CM

## 2018-06-20 DIAGNOSIS — N31.9 NEUROGENIC BLADDER: ICD-10-CM

## 2018-06-20 DIAGNOSIS — N39.490 OVERFLOW INCONTINENCE: ICD-10-CM

## 2018-06-20 DIAGNOSIS — K59.01 SLOW TRANSIT CONSTIPATION: ICD-10-CM

## 2018-06-20 RX ORDER — ALBUTEROL SULFATE 2.5 MG/3ML
2.5 SOLUTION RESPIRATORY (INHALATION) EVERY 4 HOURS PRN
Qty: 60 EACH | Refills: 3 | Status: ON HOLD | OUTPATIENT
Start: 2018-06-20 | End: 2018-10-04 | Stop reason: HOSPADM

## 2018-06-20 RX ORDER — CETIRIZINE HYDROCHLORIDE 10 MG/1
10 TABLET ORAL DAILY
Qty: 30 TABLET | Refills: 2 | Status: SHIPPED | OUTPATIENT
Start: 2018-06-20 | End: 2018-08-23 | Stop reason: SDUPTHER

## 2018-06-20 RX ORDER — POLYETHYLENE GLYCOL 3350 17 G/17G
17 POWDER, FOR SOLUTION ORAL DAILY
Qty: 527 G | Refills: 3 | Status: SHIPPED | OUTPATIENT
Start: 2018-06-20 | End: 2019-03-05 | Stop reason: SDUPTHER

## 2018-06-20 RX ORDER — UNDERPADS 23" X 36"
EACH MISCELLANEOUS
Qty: 90 EACH | Refills: 3 | Status: SHIPPED | OUTPATIENT
Start: 2018-06-20 | End: 2019-04-30 | Stop reason: SDUPTHER

## 2018-06-20 RX ORDER — SPIRONOLACTONE 25 MG/1
25 TABLET ORAL DAILY
Qty: 30 TABLET | Refills: 2 | Status: SHIPPED | OUTPATIENT
Start: 2018-06-20 | End: 2018-08-23 | Stop reason: SDUPTHER

## 2018-06-20 RX ORDER — CYCLOBENZAPRINE HCL 10 MG
10 TABLET ORAL NIGHTLY PRN
Qty: 30 TABLET | Refills: 2 | Status: SHIPPED | OUTPATIENT
Start: 2018-06-20 | End: 2018-08-23 | Stop reason: SDUPTHER

## 2018-06-20 RX ORDER — BUMETANIDE 1 MG/1
TABLET ORAL
Qty: 180 TABLET | Refills: 1 | Status: ON HOLD | OUTPATIENT
Start: 2018-06-20 | End: 2018-08-19 | Stop reason: HOSPADM

## 2018-06-20 RX ORDER — GABAPENTIN 300 MG/1
CAPSULE ORAL
Qty: 270 CAPSULE | Refills: 1 | Status: SHIPPED | OUTPATIENT
Start: 2018-06-20 | End: 2018-10-22 | Stop reason: SDUPTHER

## 2018-06-20 RX ORDER — MULTIVITAMIN WITH FOLIC ACID 400 MCG
1 TABLET ORAL DAILY
Qty: 30 TABLET | Refills: 2 | Status: SHIPPED | OUTPATIENT
Start: 2018-06-20 | End: 2018-09-21 | Stop reason: SDUPTHER

## 2018-06-20 RX ORDER — POTASSIUM CHLORIDE 20 MEQ/1
TABLET, EXTENDED RELEASE ORAL
Qty: 360 TABLET | Refills: 1 | Status: ON HOLD | OUTPATIENT
Start: 2018-06-20 | End: 2018-10-04

## 2018-06-21 RX ORDER — SUCRALFATE 1 G/1
1 TABLET ORAL 4 TIMES DAILY
Qty: 120 TABLET | Refills: 3 | Status: ON HOLD | OUTPATIENT
Start: 2018-06-21 | End: 2018-08-19

## 2018-06-21 RX ORDER — PANTOPRAZOLE SODIUM 40 MG/1
40 TABLET, DELAYED RELEASE ORAL DAILY
Qty: 30 TABLET | Refills: 3 | Status: ON HOLD | OUTPATIENT
Start: 2018-06-21 | End: 2018-08-19

## 2018-06-22 ENCOUNTER — HOSPITAL ENCOUNTER (OUTPATIENT)
Age: 73
Setting detail: SPECIMEN
Discharge: HOME | End: 2018-06-22
Payer: MEDICARE

## 2018-06-24 LAB
BILIRUBIN URINE: NEGATIVE
COLOR: YELLOW
COMMENT UA: ABNORMAL
CULTURE: NORMAL
GLUCOSE URINE: NEGATIVE
KETONES, URINE: NEGATIVE
LEUKOCYTE ESTERASE, URINE: NEGATIVE
Lab: NORMAL
NITRITE, URINE: NEGATIVE
PH UA: 7 (ref 5–8)
PROTEIN UA: NEGATIVE
SPECIFIC GRAVITY UA: 1.01 (ref 1–1.03)
SPECIMEN DESCRIPTION: NORMAL
STATUS: NORMAL
TURBIDITY: CLEAR
URINE HGB: ABNORMAL
UROBILINOGEN, URINE: NORMAL

## 2018-06-25 ENCOUNTER — TELEPHONE (OUTPATIENT)
Dept: FAMILY MEDICINE CLINIC | Age: 73
End: 2018-06-25

## 2018-07-06 ENCOUNTER — HOSPITAL ENCOUNTER (OUTPATIENT)
Age: 73
Discharge: HOME | End: 2018-07-06
Payer: MEDICARE

## 2018-07-06 ENCOUNTER — OFFICE VISIT (OUTPATIENT)
Dept: FAMILY MEDICINE CLINIC | Age: 73
End: 2018-07-06
Payer: MEDICARE

## 2018-07-06 VITALS
WEIGHT: 238.2 LBS | HEART RATE: 64 BPM | SYSTOLIC BLOOD PRESSURE: 106 MMHG | BODY MASS INDEX: 34.18 KG/M2 | DIASTOLIC BLOOD PRESSURE: 65 MMHG

## 2018-07-06 DIAGNOSIS — I10 ESSENTIAL HYPERTENSION: ICD-10-CM

## 2018-07-06 DIAGNOSIS — R19.8 ALTERNATING CONSTIPATION AND DIARRHEA: ICD-10-CM

## 2018-07-06 DIAGNOSIS — J41.0 SIMPLE CHRONIC BRONCHITIS (HCC): ICD-10-CM

## 2018-07-06 DIAGNOSIS — E78.2 MIXED HYPERLIPIDEMIA: ICD-10-CM

## 2018-07-06 DIAGNOSIS — R26.9 GAIT DISORDER: ICD-10-CM

## 2018-07-06 DIAGNOSIS — I89.0 LYMPHEDEMA OF BOTH LOWER EXTREMITIES: Primary | ICD-10-CM

## 2018-07-06 DIAGNOSIS — I48.20 CHRONIC ATRIAL FIBRILLATION (HCC): ICD-10-CM

## 2018-07-06 DIAGNOSIS — K21.9 GASTROESOPHAGEAL REFLUX DISEASE WITHOUT ESOPHAGITIS: ICD-10-CM

## 2018-07-06 LAB
ABSOLUTE EOS #: 0.3 K/UL (ref 0–0.4)
ABSOLUTE IMMATURE GRANULOCYTE: ABNORMAL K/UL (ref 0–0.3)
ABSOLUTE LYMPH #: 1.3 K/UL (ref 1–4.8)
ABSOLUTE MONO #: 0.8 K/UL (ref 0.2–0.8)
ALT SERPL-CCNC: 7 U/L (ref 5–41)
ANION GAP SERPL CALCULATED.3IONS-SCNC: 9 MMOL/L (ref 9–17)
AST SERPL-CCNC: 15 U/L
BASOPHILS # BLD: 0 % (ref 0–2)
BASOPHILS ABSOLUTE: 0 K/UL (ref 0–0.2)
BUN BLDV-MCNC: 13 MG/DL (ref 8–23)
BUN/CREAT BLD: 14 (ref 9–20)
CALCIUM SERPL-MCNC: 9.3 MG/DL (ref 8.6–10.4)
CHLORIDE BLD-SCNC: 99 MMOL/L (ref 98–107)
CHOLESTEROL/HDL RATIO: 3
CHOLESTEROL/HDL RATIO: 3
CHOLESTEROL: 157 MG/DL
CHOLESTEROL: 157 MG/DL
CO2: 31 MMOL/L (ref 20–31)
CREAT SERPL-MCNC: 0.9 MG/DL (ref 0.7–1.2)
DIFFERENTIAL TYPE: ABNORMAL
EOSINOPHILS RELATIVE PERCENT: 4 % (ref 1–4)
GFR AFRICAN AMERICAN: >60 ML/MIN
GFR NON-AFRICAN AMERICAN: >60 ML/MIN
GFR SERPL CREATININE-BSD FRML MDRD: ABNORMAL ML/MIN/{1.73_M2}
GFR SERPL CREATININE-BSD FRML MDRD: ABNORMAL ML/MIN/{1.73_M2}
GLUCOSE BLD-MCNC: 104 MG/DL (ref 70–99)
HCT VFR BLD CALC: 39.3 % (ref 41–53)
HDLC SERPL-MCNC: 52 MG/DL
HDLC SERPL-MCNC: 53 MG/DL
HEMOGLOBIN: 12.9 G/DL (ref 13.5–17.5)
IMMATURE GRANULOCYTES: ABNORMAL %
LDL CHOLESTEROL: 84 MG/DL (ref 0–130)
LDL CHOLESTEROL: 85 MG/DL (ref 0–130)
LYMPHOCYTES # BLD: 18 % (ref 24–44)
MCH RBC QN AUTO: 30.1 PG (ref 26–34)
MCHC RBC AUTO-ENTMCNC: 32.9 G/DL (ref 31–37)
MCV RBC AUTO: 91.5 FL (ref 80–100)
MONOCYTES # BLD: 11 % (ref 1–7)
NRBC AUTOMATED: ABNORMAL PER 100 WBC
PDW BLD-RTO: 16 % (ref 11.5–14.5)
PLATELET # BLD: 295 K/UL (ref 130–400)
PLATELET ESTIMATE: ABNORMAL
PMV BLD AUTO: 6.5 FL (ref 6–12)
POTASSIUM SERPL-SCNC: 4.3 MMOL/L (ref 3.7–5.3)
RBC # BLD: 4.3 M/UL (ref 4.5–5.9)
RBC # BLD: ABNORMAL 10*6/UL
SEG NEUTROPHILS: 67 % (ref 36–66)
SEGMENTED NEUTROPHILS ABSOLUTE COUNT: 4.7 K/UL (ref 1.8–7.7)
SODIUM BLD-SCNC: 139 MMOL/L (ref 135–144)
TRIGL SERPL-MCNC: 101 MG/DL
TRIGL SERPL-MCNC: 102 MG/DL
VLDLC SERPL CALC-MCNC: NORMAL MG/DL (ref 1–30)
VLDLC SERPL CALC-MCNC: NORMAL MG/DL (ref 1–30)
WBC # BLD: 7.1 K/UL (ref 3.5–11)
WBC # BLD: ABNORMAL 10*3/UL

## 2018-07-06 PROCEDURE — 80048 BASIC METABOLIC PNL TOTAL CA: CPT

## 2018-07-06 PROCEDURE — G8926 SPIRO NO PERF OR DOC: HCPCS | Performed by: PHYSICIAN ASSISTANT

## 2018-07-06 PROCEDURE — 1123F ACP DISCUSS/DSCN MKR DOCD: CPT | Performed by: PHYSICIAN ASSISTANT

## 2018-07-06 PROCEDURE — 4040F PNEUMOC VAC/ADMIN/RCVD: CPT | Performed by: PHYSICIAN ASSISTANT

## 2018-07-06 PROCEDURE — 80061 LIPID PANEL: CPT

## 2018-07-06 PROCEDURE — G8427 DOCREV CUR MEDS BY ELIG CLIN: HCPCS | Performed by: PHYSICIAN ASSISTANT

## 2018-07-06 PROCEDURE — 3023F SPIROM DOC REV: CPT | Performed by: PHYSICIAN ASSISTANT

## 2018-07-06 PROCEDURE — 99214 OFFICE O/P EST MOD 30 MIN: CPT | Performed by: PHYSICIAN ASSISTANT

## 2018-07-06 PROCEDURE — G8417 CALC BMI ABV UP PARAM F/U: HCPCS | Performed by: PHYSICIAN ASSISTANT

## 2018-07-06 PROCEDURE — 1101F PT FALLS ASSESS-DOCD LE1/YR: CPT | Performed by: PHYSICIAN ASSISTANT

## 2018-07-06 PROCEDURE — 85025 COMPLETE CBC W/AUTO DIFF WBC: CPT

## 2018-07-06 PROCEDURE — 84460 ALANINE AMINO (ALT) (SGPT): CPT

## 2018-07-06 PROCEDURE — 3017F COLORECTAL CA SCREEN DOC REV: CPT | Performed by: PHYSICIAN ASSISTANT

## 2018-07-06 PROCEDURE — 84450 TRANSFERASE (AST) (SGOT): CPT

## 2018-07-06 PROCEDURE — 1036F TOBACCO NON-USER: CPT | Performed by: PHYSICIAN ASSISTANT

## 2018-07-06 PROCEDURE — 36415 COLL VENOUS BLD VENIPUNCTURE: CPT

## 2018-07-06 ASSESSMENT — ENCOUNTER SYMPTOMS
ABDOMINAL PAIN: 1
CONSTIPATION: 1
DIARRHEA: 1
VOMITING: 0
NAUSEA: 0
BLOOD IN STOOL: 0
BLOATING: 1

## 2018-07-06 NOTE — PATIENT INSTRUCTIONS
Patient Education        Diarrhea: Care Instructions  Your Care Instructions    Diarrhea is loose, watery stools (bowel movements). The exact cause is often hard to find. Sometimes diarrhea is your body's way of getting rid of what caused an upset stomach. Viruses, food poisoning, and many medicines can cause diarrhea. Some people get diarrhea in response to emotional stress, anxiety, or certain foods. Almost everyone has diarrhea now and then. It usually isn't serious, and your stools will return to normal soon. The important thing to do is replace the fluids you have lost, so you can prevent dehydration. The doctor has checked you carefully, but problems can develop later. If you notice any problems or new symptoms, get medical treatment right away. Follow-up care is a key part of your treatment and safety. Be sure to make and go to all appointments, and call your doctor if you are having problems. It's also a good idea to know your test results and keep a list of the medicines you take. How can you care for yourself at home? · Watch for signs of dehydration, which means your body has lost too much water. Dehydration is a serious condition and should be treated right away. Signs of dehydration are:  ¨ Increasing thirst and dry eyes and mouth. ¨ Feeling faint or lightheaded. ¨ Darker urine, and a smaller amount of urine than normal.  · To prevent dehydration, drink plenty of fluids, enough so that your urine is light yellow or clear like water. Choose water and other caffeine-free clear liquids until you feel better. If you have kidney, heart, or liver disease and have to limit fluids, talk with your doctor before you increase the amount of fluids you drink. · Begin eating small amounts of mild foods the next day, if you feel like it. ¨ Try yogurt that has live cultures of Lactobacillus.  (Check the label.)  ¨ Avoid spicy foods, fruits, alcohol, and caffeine until 48 hours after all symptoms are Incorporated disclaims any warranty or liability for your use of this information.

## 2018-07-06 NOTE — PROGRESS NOTES
monitoring  05/26/2019    Creatinine monitoring  05/26/2019    Lipid screen  04/10/2022    Colon cancer screen colonoscopy  03/26/2023    AAA screen  Completed    Hepatitis C screen  Completed

## 2018-07-06 NOTE — PROGRESS NOTES
[address]  [place]  [address] [address]  Dept: [phone]  Dept Fax: [phone]    Office Progress/Follow Up Note  Date of patient's visit: 7/6/2018  Patient's Name:  Luis Manuel Adam YOB: 1945            Patient Care Team:  Van Parkinson MD as PCP - General (Family Medicine)  Js Hale PA-C as PCP - S Attributed Provider  Marizol Pantoja MD as Consulting Physician (Gastroenterology)  [address]  ================================================================    REASON FOR VISIT/CHIEF COMPLAINT:  Diarrhea (pt here for 1 mth follow-up)     HISTORY OF PRESENTING ILLNESS:  History was obtained from: patient. Luis Manuel Adam is a 71 y.o. is here follow-up for diarrhea and constipation, GERD, HLD, lymphedema. He is followed by bariatrics for GERD, gastritis, gastric ulcer. Patient states medication has improved constipation and diarrhea. She will be following her bariatric next month. Patient is followed by cardiology for atrial fibrillation, pulmonology for COPD, urology for hematuria. patient is requesting referral for PT for gait disorder, informed patient he will be referred to PT. And instructed patient to follow-up with bariatrics for GERD, diarrhea and constipation.      HPI    Patient Active Problem List   Diagnosis    Atrial fibrillation ([address]    Hyperlipidemia    GERD (gastroesophageal reflux disease)    Osteoarthritis of lumbar spine    OA (osteoarthritis) of knee, bilateral    Foot drop, right    Impaired hearing    Chronic rhinosinusitis    Special screening for malignant neoplasm of prostate    Lymphedema of both lower extremities    Hallux valgus, acquired, bilateral    Tinnitus    Gait disorder    S/P revision of total knee    Fracture of femur ([address]    Weight loss    Hypertension    Slow transit constipation    Internal hemorrhoids    Calculus of gallbladder patient instructions    Electronically signed by Gildardo Salas PA-C on 7/6/18 at 9:05 AM    This note is created with the assistance of a speech-recognition program. While intending to generate a document that actually reflects the content of the visit, the document can still have some mistakes which may not have been identified and corrected by editing.

## 2018-07-11 ASSESSMENT — ENCOUNTER SYMPTOMS
BACK PAIN: 0
SHORTNESS OF BREATH: 0
COUGH: 0

## 2018-07-16 ENCOUNTER — HOSPITAL ENCOUNTER (OUTPATIENT)
Dept: PHYSICAL THERAPY | Facility: CLINIC | Age: 73
Setting detail: THERAPIES SERIES
Discharge: HOME | End: 2018-07-16
Payer: MEDICARE

## 2018-07-16 PROCEDURE — G8979 MOBILITY GOAL STATUS: HCPCS

## 2018-07-16 PROCEDURE — 97110 THERAPEUTIC EXERCISES: CPT

## 2018-07-16 PROCEDURE — G8978 MOBILITY CURRENT STATUS: HCPCS

## 2018-07-16 PROCEDURE — 97162 PT EVAL MOD COMPLEX 30 MIN: CPT

## 2018-07-19 ENCOUNTER — HOSPITAL ENCOUNTER (OUTPATIENT)
Dept: PHYSICAL THERAPY | Facility: CLINIC | Age: 73
Setting detail: THERAPIES SERIES
Discharge: HOME | End: 2018-07-19
Payer: MEDICARE

## 2018-07-19 PROCEDURE — 97110 THERAPEUTIC EXERCISES: CPT

## 2018-07-19 NOTE — FLOWSHEET NOTE
[] Laly Bergeron       Outpatient Physical        Therapy       [address]       Phone: [phone]       Fax: [phone] [] [facility] at [address] [facility]       Phone: [phone]       Fax: [phone] [] [address] [place] [address]   Phone: [phone]   Fax:  [phone]     Physical Therapy Daily Treatment Note    Date:  2018  Patient: Tita Pearce                                                      : 1945                    MRN: [id number]  Physician: Darnell Encarnacion PA-C                                   Insurance: Medicare, Medicaid  Medical Diagnosis: gait disorder                                          Rehab Codes: R26.2, R62.81, M62.591, M62.592, R26.89  Onset date: 2018                                                 Next 's appt.: 2018  Visit# / total visits: 10  Cancels/No Shows:     Estimated Medicare Amount = $156.04 (updated on 18)      G-CODE (set at eval on visit #1)     Functional Limitation: mobility  Functional Assessment Used: LEFI ( 82% overall limtations), TUG ( 34 sec)  Current Status Modifier: Ck  Goal Status Modifier: Ci  Discharge Status Modifier:   Subjective: pt arrived noting some pain in bilat knee but other than that feeling pretty good.      Pain:  [x] Yes  [] No Location: bilat knee Pain Rating: (0-10 scale) 2/10  Pain altered Tx:  [] No  [] Yes  Action:  Comments:    Todays Treatment:  Modalities:   Precautions:COPD  Exercises:  Exercise Reps/ Time Weight/ Level Comments   SciFit - seat at 11 5.3 min L3 Pulse ox after = 98% O2 82 HR   UBE 3 min L1              Mat         bridging 15       SLR 10x each       Hip add, pillow squeeze, knee bents 15x                 // bars        Heel raises  15x       Mini squats 15x     marching 15x     HS curls 15x     Balance feet close 3x30\"           Gait in // bars

## 2018-07-23 ENCOUNTER — HOSPITAL ENCOUNTER (OUTPATIENT)
Dept: PHYSICAL THERAPY | Facility: CLINIC | Age: 73
Setting detail: THERAPIES SERIES
Discharge: HOME | End: 2018-07-23
Payer: MEDICARE

## 2018-07-23 PROCEDURE — 97110 THERAPEUTIC EXERCISES: CPT

## 2018-07-26 ENCOUNTER — HOSPITAL ENCOUNTER (OUTPATIENT)
Dept: PHYSICAL THERAPY | Facility: CLINIC | Age: 73
Setting detail: THERAPIES SERIES
Discharge: HOME | End: 2018-07-26
Payer: MEDICARE

## 2018-07-26 PROCEDURE — 97110 THERAPEUTIC EXERCISES: CPT

## 2018-07-26 NOTE — FLOWSHEET NOTE
bents 15x       Sit to stand 2x10               // bars        Heel raises  15x       Mini squats 15x     marching 15x 1# each leg Increased resistance 7/26   HS curls 15x 1# each leg Increased resistance 7/26   Balance feet close 3x30\"           Gait in // bars      marching 2L     Heel to toe gait 2L     Side steps 2L     Wide JACOB  2L     Other:     Specific Instructions for next treatment: Seated posture with scapular retraction, decrease breaks during treatment, step ups. Assess goals. Treatment Charges: Mins Units   []  Modalities     [x]  Ther Exercise 40 3   []  Manual Therapy     []  Ther Activities     []  Aquatics     []  Vasocompression     []  Other     Total Treatment time 40 3       Assessment: [x] Progressing toward goals. Pt with improved tolerance to aerobic exercise this date; O2% doesn't dip below 98% (last session dipped to 91% at same resistance/time used today). Pt continues to demonstrate forward trunk lean when practicing exercises or balance without an assistive device; verbal cues improve this, limited carryover. Increased trunk sway near 20-30s of consecutive narrow JACOB balance challenge, demonstrating continued impairment in this area - will progress as able. [] No change. [] Other:    STG: (to be met in 5 treatments)  1. ? Strength: demonstrate 30 minutes of active exercise with no more than 3 breaks to increase endurance to activity. 2. ? Function: demonstrate sitting upright in chair with scapular retraction. 3.    4. Independent with Home Exercise Programs  Demonstrate Knowledge of fall prevention 7-16-18 met, issued and educated on fall prevention.     LTG: (to be met in 10 treatments)  1. Improve LEFI score, by 1 level for a (usual housework), d (walking between rooms) and J (in and out of car)  2. Decrease pain from 4/10 to 2/10 maximum  3. Up and down 5 steps safely, 1 rail to simulate getting in and out of home. 4. Improve TUG score by 6 seconds    Pt.  Education:

## 2018-07-30 ENCOUNTER — HOSPITAL ENCOUNTER (OUTPATIENT)
Dept: PHYSICAL THERAPY | Facility: CLINIC | Age: 73
Setting detail: THERAPIES SERIES
Discharge: HOME | End: 2018-07-30
Payer: MEDICARE

## 2018-07-30 PROCEDURE — 97110 THERAPEUTIC EXERCISES: CPT

## 2018-07-30 NOTE — FLOWSHEET NOTE
[] Eri Berumen       Outpatient Physical        Therapy       [address]       Phone: [phone]       Fax: [phone] [x] [facility] at [address] [facility]       Phone: [phone]       Fax: [phone] [] [place] [place] [address] [facility]otion  [address] [place] [address]   Phone: [phone]   Fax:  [phone]     Physical Therapy Daily Treatment Note    Date:  2018  Patient: Roger Fulton                                                      : 1945                    MRN: [id number]  Physician: Matt Sanford PA-C                                   Insurance: Medicare, Medicaid  Medical Diagnosis: gait disorder                                          Rehab Codes: R26.2, R62.81, M62.591, M62.592, R26.89  Onset date: 2018                        Next 's appt.: 2018  Visit# / total visits: 5/10  Cancels/No Shows: 0/0    Estimated Medicare Amount = $310.98 (updated on 18)      G-CODE (set at eval on visit #1)       Functional Limitation: mobility  Functional Assessment Used: LEFI ( 82% overall limtations), TUG ( 34 sec)  Current Status Modifier: Ck  Goal Status Modifier: Ci  Discharge Status Modifier:     Subjective:     Pain:  [x] Yes  [] No Location: whole body Pain Rating: (0-10 scale) 2-3/10  Pain altered Tx:  [x] No  [] Yes  Action:  Comments: Pt arrived noting pain all over body, but more of an achy feeling, with pt think its due to the weather.       Todays Treatment:  Modalities:   Precautions:COPD  Exercises:  Exercise Reps/ Time Weight/ Level Comments   SciFit - seat at 11 6 min L5 Pulse ox after = 98% O2, 84 HR   UBE 6 min L5 Pulse ox before = 98% O2, 89 HR             Mat         bridging 15x       SLR 10x each    Quad lag on LLE; began on left with quad sets to encourage terminal knee extension throughout SLR   Hip add, pillow squeeze, knee bents 15x       Sit to stand 2x10         ONGOING  4. Improve TUG score by 6 seconds    Pt. Education:  [x] Yes  [] No  [x] Reviewed Prior HEP/Ed  Method of Education: [x] Verbal  [] Demo  [x] Written: provided handout of SLR, bridges, hip add at patient request  Comprehension of Education:  [x] Verbalizes understanding. [x] Demonstrates understanding. [] Needs review. [] Demonstrates/verbalizes HEP/Ed previously given. Plan: [x] Continue per plan of care.    [] Other:      Time In: 9:25am            Time Out: 10:20am    Electronically signed by:  Johny Domingo PTA

## 2018-08-02 ENCOUNTER — HOSPITAL ENCOUNTER (OUTPATIENT)
Dept: PHYSICAL THERAPY | Facility: CLINIC | Age: 73
Setting detail: THERAPIES SERIES
Discharge: HOME | End: 2018-08-02
Payer: MEDICARE

## 2018-08-02 PROCEDURE — 97110 THERAPEUTIC EXERCISES: CPT

## 2018-08-06 ENCOUNTER — HOSPITAL ENCOUNTER (OUTPATIENT)
Dept: PHYSICAL THERAPY | Facility: CLINIC | Age: 73
Setting detail: THERAPIES SERIES
Discharge: HOME | End: 2018-08-06
Payer: MEDICARE

## 2018-08-06 PROCEDURE — 97110 THERAPEUTIC EXERCISES: CPT

## 2018-08-06 PROCEDURE — 97116 GAIT TRAINING THERAPY: CPT

## 2018-08-06 NOTE — FLOWSHEET NOTE
[] Ana Carlson       Outpatient Physical        Therapy       [address]       Phone: [phone]       Fax: [phone] [x] [facility] for [facility] at [address] [facility]       Phone: [phone]       Fax: [phone] [] [facility] for [facility]otion  [address] [facility]   Phone: [phone]   Fax:  [phone]     Physical Therapy Daily Treatment Note    Date:  2018  Patient: Simpson Saint                                                      : 1945                    MRN: [id number]  Physician: Aldo Blood PA-C                                   Insurance: Medicare, Medicaid  Medical Diagnosis: gait disorder                                          Rehab Codes: R26.2, R62.81, M62.591, M62.592, R26.89  Onset date: 2018                        Next 's appt.: 2018  Visit# / total visits: 7/10  Cancels/No Shows: 0/0        Subjective:     Pain:  [x] Yes  [] No Location: abdomen/back Pain Rating: (0-10 scale) 4/10  Pain altered Tx:  [x] No  [] Yes  Action:  Comments: Pt arrived with pain starting on the R portion of abdomen wrapping around to back.       Todays Treatment:  Modalities:   Precautions:COPD  Exercises:  Exercise Reps/ Time Weight/ Level Comments   SciFit - seat at 11 6 min L5 Pulse ox after = 98% O2, 85 HR   UBE 6 min L5 Pulse ox before = 97% O2, 62 HR             Mat         bridging 15x       SLR 15x each    Quad lag on LLE; began on left with quad sets to encourage terminal knee extension throughout SLR   clamshells 15x green supine   Hip add, pillow squeeze, knee bents 15x       Sit to stand 2x10  No rest breaks             // bars        Heel raises  15x       Mini squats 15x     marching 15x 1# each leg Increased resistance    HS curls 15x 1# each leg Increased resistance    Balance feet close 3x30\" foam Added foam this date          Gait in // bars      marching 2L Heel to toe gait 2L     Retro walking 1 lap 1L      Side steps 2L  With gait belt around patient, facing patient holding belt, with patient's hands on clinician's forearms   Wide JACOB  2L  Did not complete this date, unsure of execution of last PT/PTA. Step ups x10 4\"     AlterG walk 15'     Other: alteG 1.0-1.5 speed. 45%-60% body weight      Specific Instructions for next treatment: Seated posture with scapular retraction, decrease breaks during treatment ALTER G NEXT TREATMENT       Treatment Charges: Mins Units   []  Modalities     [x]  Ther Exercise 30 2   []  Manual Therapy     []  Ther Activities     []  Aquatics     []  Vasocompression     [x]  Other: gait 15 1   Total Treatment time 45 3   Estimated Medicare Amount as of 8/2/18 = $465.38         Assessment: [x] Progressing toward goals. Progressed pt with adding alterG walk, step ups, and supine clamshells this date. Pt displaying 60% weight bearing on R and 40% weight bearing at 100% body weight, with 50/50 weight bearing once pt was decreased to 45-50% body weight. Pt able to ambulate with symmetrical step length once at 45% body weight as well as increased speed to 1.5. Pts O2 stayed above 95% entire treatment, as well as HR at 70-95. Continue with alterG ambulation as well as progressions as alexia. [] No change. [] Other:        G-CODE (set at eval on visit #1)       Functional Limitation: mobility  Functional Assessment Used: LEFI ( 82% overall limtations), TUG ( 34 sec)  Current Status Modifier: Ck  Goal Status Modifier: Ci  Discharge Status Modifier:     STG: (to be met in 5 treatments)  1. ? Strength: demonstrate 30 minutes of active exercise with no more than 3 breaks to increase endurance to activity. MET 7/30  2. ? Function: demonstrate sitting upright in chair with scapular retraction. 3.    4.  Independent with Home Exercise Programs  Demonstrate Knowledge of fall prevention 7-16-18 met, issued and educated on fall prevention.     LTG: (to be met in 10 treatments)  1. Improve LEFI score, by 1 level for a (usual housework), d (walking between rooms) and J (in and out of car)  2. Decrease pain from 4/10 to 2/10 maximum  3. Up and down 5 steps safely, 1 rail to simulate getting in and out of home. ONGOING  4. Improve TUG score by 6 seconds    Pt. Education:  [x] Yes  [] No  [x] Reviewed Prior HEP/Ed  Method of Education: [x] Verbal  [] Demo  [x] Written: provided handout of SLR, bridges, hip add at patient request  Comprehension of Education:  [x] Verbalizes understanding. [x] Demonstrates understanding. [] Needs review. [] Demonstrates/verbalizes HEP/Ed previously given. Plan: [x] Continue per plan of care.    [] Other:      Time In: 8:54 am            Time Out:  9:57am    Electronically signed by:  Hedy Serrano PTA

## 2018-08-09 ENCOUNTER — HOSPITAL ENCOUNTER (OUTPATIENT)
Dept: PHYSICAL THERAPY | Facility: CLINIC | Age: 73
Setting detail: THERAPIES SERIES
Discharge: HOME | End: 2018-08-09
Payer: MEDICARE

## 2018-08-09 ENCOUNTER — OFFICE VISIT (OUTPATIENT)
Dept: BARIATRICS/WEIGHT MGMT | Age: 73
End: 2018-08-09
Payer: MEDICARE

## 2018-08-09 VITALS
BODY MASS INDEX: 34.22 KG/M2 | HEART RATE: 72 BPM | WEIGHT: 239 LBS | SYSTOLIC BLOOD PRESSURE: 112 MMHG | HEIGHT: 70 IN | RESPIRATION RATE: 20 BRPM | DIASTOLIC BLOOD PRESSURE: 68 MMHG

## 2018-08-09 DIAGNOSIS — K21.9 GASTROESOPHAGEAL REFLUX DISEASE WITHOUT ESOPHAGITIS: Primary | ICD-10-CM

## 2018-08-09 DIAGNOSIS — Z98.84 STATUS POST BARIATRIC SURGERY: ICD-10-CM

## 2018-08-09 PROCEDURE — 1036F TOBACCO NON-USER: CPT | Performed by: SURGERY

## 2018-08-09 PROCEDURE — 1123F ACP DISCUSS/DSCN MKR DOCD: CPT | Performed by: SURGERY

## 2018-08-09 PROCEDURE — G8417 CALC BMI ABV UP PARAM F/U: HCPCS | Performed by: SURGERY

## 2018-08-09 PROCEDURE — 99213 OFFICE O/P EST LOW 20 MIN: CPT | Performed by: SURGERY

## 2018-08-09 PROCEDURE — G8427 DOCREV CUR MEDS BY ELIG CLIN: HCPCS | Performed by: SURGERY

## 2018-08-09 PROCEDURE — 1101F PT FALLS ASSESS-DOCD LE1/YR: CPT | Performed by: SURGERY

## 2018-08-09 PROCEDURE — 97110 THERAPEUTIC EXERCISES: CPT

## 2018-08-09 PROCEDURE — 3017F COLORECTAL CA SCREEN DOC REV: CPT | Performed by: SURGERY

## 2018-08-09 PROCEDURE — 4040F PNEUMOC VAC/ADMIN/RCVD: CPT | Performed by: SURGERY

## 2018-08-09 NOTE — FLOWSHEET NOTE
[] Lio Tate       Outpatient Physical        Therapy       [address]       Phone: [phone]       Fax: [phone] [x] [facility] for [facility] at [address] [facility]       Phone: [phone]       Fax: [phone] [] Criselda Monet [facility] for Health Promotion  [address] [facility]   Phone: [phone]   Fax:  [phone]     Physical Therapy Daily Treatment Note    Date:  2018  Patient: Monalisa Boo                                                      : 1945                    MRN: [id number]  Physician: Gary Archuleta PA-C                                   Insurance: Medicare, Medicaid  Medical Diagnosis: gait disorder                                          Rehab Codes: R26.2, R62.81, M62.591, M62.592, R26.89  Onset date: 2018                        Next 's appt.: 2018  Visit# / total visits: 8/10  Cancels/No Shows: 0/0        Subjective:     Pain:  [x] Yes  [] No Location: abdomen/back, bilat knee Pain Rating: (0-10 scale) 3-4/10  Pain altered Tx:  [x] No  [] Yes  Action:  Comments: Pt arrived still having pain in abdomen/back as well as bilat knee pain.      Todays Treatment:  Modalities:   Precautions:COPD  Exercises:  Exercise Reps/ Time Weight/ Level Comments   SciFit - seat at 11 6 min L5 Pulse ox after = 98% O2, 79 HR   UBE 6 min L5 Pulse ox before = 98% O2, 76 HR             Mat         bridging 15x       SLR 15x each    Quad lag on LLE; began on left with quad sets to encourage terminal knee extension throughout SLR   clamshells 15x green supine   Hip add, pillow squeeze, knee bents 15x       Sit to stand 2x10  No rest breaks             // bars        Heel raises  15x       Mini squats 15x     marching 15x 1# each leg Increased resistance    HS curls 15x 1# each leg Increased resistance    Balance feet close 3x30\" foam Added foam this date    3 way hip 10x 1#    Tandem standing balance 2x15\"  bilat          Gait in // bars      marching 2L     Heel to toe gait 2L     Retro walking 1 lap 1L      Side steps 2L  With gait belt around patient, facing patient holding belt, with patient's hands on clinician's forearms   Wide JACOB  2L           Step ups x10 4\"     AlterG walk 15'  Not today   Other: alteG 1.0-1.5 speed. 45%-60% body weight      Specific Instructions for next treatment: Seated posture with scapular retraction, decrease breaks during treatment       Treatment Charges: Mins Units   []  Modalities     [x]  Ther Exercise 45 3   []  Manual Therapy     []  Ther Activities     []  Aquatics     []  Vasocompression     [x]  Other: gait     Total Treatment time 45 3   Estimated Medicare Amount as of 8/9/18 = $540.85         Assessment: [x] Progressing toward goals. Progressed pt with adding 3 way hip as well as tandem standing balance with good alexia. Pt able to complete sit to stand with no UE support on 50% of attempts this date. Pt noting increased muscle fatigue post treatment but no increase in pain in bilat knee. [] No change. [] Other:        G-CODE (set at eval on visit #1)       Functional Limitation: mobility  Functional Assessment Used: LEFI ( 82% overall limtations), TUG ( 34 sec)  Current Status Modifier: Ck  Goal Status Modifier: Ci  Discharge Status Modifier:     STG: (to be met in 5 treatments)  1. ? Strength: demonstrate 30 minutes of active exercise with no more than 3 breaks to increase endurance to activity. MET 7/30  2. ? Function: demonstrate sitting upright in chair with scapular retraction. 3.    4. Independent with Home Exercise Programs  Demonstrate Knowledge of fall prevention 7-16-18 met, issued and educated on fall prevention.     LTG: (to be met in 10 treatments)  1. Improve LEFI score, by 1 level for a (usual housework), d (walking between rooms) and J (in and out of car)  2. Decrease pain from 4/10 to 2/10 maximum  3.  Up and down 5 steps safely, 1 rail to simulate getting in and out of home. ONGOING  4. Improve TUG score by 6 seconds    Pt. Education:  [x] Yes  [] No  [x] Reviewed Prior HEP/Ed  Method of Education: [x] Verbal  [] Demo  [x] Written: provided handout of SLR, bridges, hip add at patient request  Comprehension of Education:  [x] Verbalizes understanding. [x] Demonstrates understanding. [] Needs review. [] Demonstrates/verbalizes HEP/Ed previously given. Plan: [x] Continue per plan of care.    [] Other:      Time In: 9:10 am            Time Out:  10:00am    Electronically signed by:  Mervin Ma PTA

## 2018-08-12 ENCOUNTER — HOSPITAL ENCOUNTER (INPATIENT)
Age: 73
LOS: 6 days | Discharge: HOME | DRG: 377 | End: 2018-08-19
Attending: EMERGENCY MEDICINE | Admitting: INTERNAL MEDICINE
Payer: MEDICARE

## 2018-08-12 ENCOUNTER — APPOINTMENT (OUTPATIENT)
Dept: GENERAL RADIOLOGY | Age: 73
DRG: 377 | End: 2018-08-12
Payer: MEDICARE

## 2018-08-12 DIAGNOSIS — K92.0 HEMATEMESIS WITH NAUSEA: Primary | ICD-10-CM

## 2018-08-12 LAB
ABO/RH: NORMAL
ALBUMIN SERPL-MCNC: 3.6 G/DL (ref 3.5–5.2)
ALBUMIN/GLOBULIN RATIO: 1.1 (ref 1–2.5)
ALP BLD-CCNC: 74 U/L (ref 40–129)
ALT SERPL-CCNC: 6 U/L (ref 5–41)
ANION GAP SERPL CALCULATED.3IONS-SCNC: 11 MMOL/L (ref 9–17)
ANTIBODY SCREEN: NEGATIVE
ARM BAND NUMBER: NORMAL
AST SERPL-CCNC: 14 U/L
BILIRUB SERPL-MCNC: 1.09 MG/DL (ref 0.3–1.2)
BILIRUBIN DIRECT: 0.38 MG/DL
BILIRUBIN, INDIRECT: 0.71 MG/DL (ref 0–1)
BUN BLDV-MCNC: 15 MG/DL (ref 8–23)
BUN/CREAT BLD: ABNORMAL (ref 9–20)
CALCIUM SERPL-MCNC: 8.8 MG/DL (ref 8.6–10.4)
CHLORIDE BLD-SCNC: 98 MMOL/L (ref 98–107)
CO2: 28 MMOL/L (ref 20–31)
CREAT SERPL-MCNC: 0.84 MG/DL (ref 0.7–1.2)
EKG ATRIAL RATE: 81 BPM
EKG Q-T INTERVAL: 436 MS
EKG QRS DURATION: 118 MS
EKG QTC CALCULATION (BAZETT): 477 MS
EKG R AXIS: 150 DEGREES
EKG T AXIS: -3 DEGREES
EKG VENTRICULAR RATE: 72 BPM
EXPIRATION DATE: NORMAL
GFR AFRICAN AMERICAN: >60 ML/MIN
GFR NON-AFRICAN AMERICAN: >60 ML/MIN
GFR SERPL CREATININE-BSD FRML MDRD: ABNORMAL ML/MIN/{1.73_M2}
GFR SERPL CREATININE-BSD FRML MDRD: ABNORMAL ML/MIN/{1.73_M2}
GLOBULIN: ABNORMAL G/DL (ref 1.5–3.8)
GLUCOSE BLD-MCNC: 109 MG/DL (ref 70–99)
HCT VFR BLD CALC: 38.3 % (ref 40.7–50.3)
HEMOGLOBIN: 12 G/DL (ref 13–17)
INR BLD: 1.4
LACTIC ACID, WHOLE BLOOD: 1 MMOL/L (ref 0.7–2.1)
LIPASE: 8 U/L (ref 13–60)
MCH RBC QN AUTO: 29.2 PG (ref 25.2–33.5)
MCHC RBC AUTO-ENTMCNC: 31.3 G/DL (ref 28.4–34.8)
MCV RBC AUTO: 93.2 FL (ref 82.6–102.9)
NRBC AUTOMATED: 0 PER 100 WBC
PDW BLD-RTO: 14.6 % (ref 11.8–14.4)
PLATELET # BLD: 210 K/UL (ref 138–453)
PMV BLD AUTO: 8.9 FL (ref 8.1–13.5)
POC TROPONIN I: 0.02 NG/ML (ref 0–0.1)
POC TROPONIN I: 0.03 NG/ML (ref 0–0.1)
POC TROPONIN INTERP: NORMAL
POC TROPONIN INTERP: NORMAL
POTASSIUM SERPL-SCNC: 3.7 MMOL/L (ref 3.7–5.3)
PROTHROMBIN TIME: 14.3 SEC (ref 9–12)
RBC # BLD: 4.11 M/UL (ref 4.21–5.77)
SODIUM BLD-SCNC: 137 MMOL/L (ref 135–144)
TOTAL PROTEIN: 6.8 G/DL (ref 6.4–8.3)
WBC # BLD: 11 K/UL (ref 3.5–11.3)

## 2018-08-12 PROCEDURE — 86900 BLOOD TYPING SEROLOGIC ABO: CPT

## 2018-08-12 PROCEDURE — 85027 COMPLETE CBC AUTOMATED: CPT

## 2018-08-12 PROCEDURE — C9113 INJ PANTOPRAZOLE SODIUM, VIA: HCPCS | Performed by: STUDENT IN AN ORGANIZED HEALTH CARE EDUCATION/TRAINING PROGRAM

## 2018-08-12 PROCEDURE — 85610 PROTHROMBIN TIME: CPT

## 2018-08-12 PROCEDURE — 86850 RBC ANTIBODY SCREEN: CPT

## 2018-08-12 PROCEDURE — 84484 ASSAY OF TROPONIN QUANT: CPT

## 2018-08-12 PROCEDURE — 93005 ELECTROCARDIOGRAM TRACING: CPT

## 2018-08-12 PROCEDURE — 99285 EMERGENCY DEPT VISIT HI MDM: CPT

## 2018-08-12 PROCEDURE — 83605 ASSAY OF LACTIC ACID: CPT

## 2018-08-12 PROCEDURE — 86901 BLOOD TYPING SEROLOGIC RH(D): CPT

## 2018-08-12 PROCEDURE — 96365 THER/PROPH/DIAG IV INF INIT: CPT

## 2018-08-12 PROCEDURE — 2580000003 HC RX 258: Performed by: STUDENT IN AN ORGANIZED HEALTH CARE EDUCATION/TRAINING PROGRAM

## 2018-08-12 PROCEDURE — 80076 HEPATIC FUNCTION PANEL: CPT

## 2018-08-12 PROCEDURE — 83690 ASSAY OF LIPASE: CPT

## 2018-08-12 PROCEDURE — 85730 THROMBOPLASTIN TIME PARTIAL: CPT

## 2018-08-12 PROCEDURE — 71046 X-RAY EXAM CHEST 2 VIEWS: CPT

## 2018-08-12 PROCEDURE — 96366 THER/PROPH/DIAG IV INF ADDON: CPT

## 2018-08-12 PROCEDURE — 6360000002 HC RX W HCPCS: Performed by: STUDENT IN AN ORGANIZED HEALTH CARE EDUCATION/TRAINING PROGRAM

## 2018-08-12 PROCEDURE — 80048 BASIC METABOLIC PNL TOTAL CA: CPT

## 2018-08-12 PROCEDURE — 96375 TX/PRO/DX INJ NEW DRUG ADDON: CPT

## 2018-08-12 RX ORDER — ONDANSETRON 2 MG/ML
4 INJECTION INTRAMUSCULAR; INTRAVENOUS ONCE
Status: COMPLETED | OUTPATIENT
Start: 2018-08-12 | End: 2018-08-12

## 2018-08-12 RX ADMIN — ONDANSETRON 4 MG: 2 INJECTION INTRAMUSCULAR; INTRAVENOUS at 20:47

## 2018-08-12 RX ADMIN — SODIUM CHLORIDE 8 MG/HR: 9 INJECTION, SOLUTION INTRAVENOUS at 20:49

## 2018-08-12 RX ADMIN — SODIUM CHLORIDE 80 MG: 9 INJECTION, SOLUTION INTRAVENOUS at 22:02

## 2018-08-12 ASSESSMENT — PAIN DESCRIPTION - PAIN TYPE: TYPE: ACUTE PAIN

## 2018-08-12 ASSESSMENT — PAIN DESCRIPTION - LOCATION: LOCATION: ABDOMEN

## 2018-08-12 ASSESSMENT — PAIN DESCRIPTION - ORIENTATION: ORIENTATION: UPPER

## 2018-08-12 ASSESSMENT — PAIN DESCRIPTION - DESCRIPTORS: DESCRIPTORS: DISCOMFORT

## 2018-08-12 ASSESSMENT — PAIN SCALES - GENERAL: PAINLEVEL_OUTOF10: 6

## 2018-08-12 NOTE — PROGRESS NOTES
[facility]  [facility] INVASIVE BARIATRIC SURG  [address] [facility]  [address]  [address]  [address]  Dept: [phone]    8/9/2018    CC: Follow up from EGD and Nausea    History:  71year old male who underwent vertical banded gastroplasty over 20 years ago. He presents after recent hospital admission for SBO. He was started on Carafate and PPI for gastritis and a small ulcer noted in the gastric pouch. He also had a negative small bowel follow through. He denies nausea today or abdominal pain. He states he still has bloating at times and has had episodes of emesis over the past month. He denies melena or hematochezia. He presents today for further follow up. He has eaten regular foods over the last week. He still has GERD.     Review of Systems:    General  Negative for: [] Weight Change   [x] Fatigue   [x] Fevers & Chills    [] Appetite change [] Other:    Positive for: [x] Weight Change   [] Fatigue   [] Fevers & Chills    [] Appetite change [] Other:   Cardiac  Negative for: [x] Chest Pain   [] Difficulty Breathing   [] Leg Cramps [x] Edema of Lower Extremeties    [] Left   [] Right      Positive for: [] Chest Pain   [] Difficulty Breathing   [] Leg Cramps [] Edema of Lower Extremeties    [] Left   [] Right   Pulmonary  Negative for: [x] Shortness of Breath [] Wheeze [] Cough  [x] Calf Pain     Positive for: [] Shortness of Breath [] Wheeze [] Cough  [] Calf Pain   Gastro-Intestinal Negative for: [] Heartburn   [] Reflux   [] Dysphagia   [x] Melena   [x] BRBPR  [x] Vomiting   [x] Abdominal Pain   [x] Diarrhea  [] Hernia    [x] Constipation  [] Other:     Positive for: [x] Heartburn   [x] Reflux   [] Dysphagia   [] Melena   [] BRBPR  [] Vomiting   [] Abdominal Pain   [] Diarrhea  [] Hernia    [] Constipation  [] Other:    Muskuloskeletal Negative for: [] Joint pain   [] Back pain   [] Knee Pain   [x] Muscle weakness   [x] Edema [] Other:     Positive for: [] Joint pain   [] Back pain   [] Knee Pain   [] Muscle weakness  [] Edema [] Other:    Neurologic Negative for: [x] Syncope   [x] Insomnia   [] Being treated for depression  [] Other:     Positive for: [] Syncope   [] Insomnia   [] Being treated for depression  [] Other:    Skin Negative for: [x] Wound:   [] Open   [] Draining   [] Incisional     [x] Rash   [] Hair Loss  [] Other:     Positive for: [] Wound:   [] Open   [] Draining    [] Incisional  [] Rash   [] Hair Loss  [] Other:        Physical Exam:  /68 (Site: Left Arm, Position: Sitting, Cuff Size: Large Adult)   Pulse 72   Resp 20   Ht 5' 10\" (1.778 m)   Wt 239 lb (108.4 kg)   BMI 34.29 kg/m²   Constitutional:  Vital signs are normal. The patient appears well-developed and well-nourished. HEENT:   Head: Normocephalic. Atraumatic  Eyes: pupils are equal and reactive. No scleral icterus is present. Neck: No mass and no thyromegaly present. Cardiovascular: Normal rate, regular rhythm, S1 normal and S2 normal.  Radial pulses present   Pulmonary/Chest: Effort normal and breath sounds normal. No retractions  Abdominal: Soft. Normal appearance. There is no organomegaly. No tenderness. There is no rigidity, no rebound, no guarding and no Diaz's sign. Musculoskeletal:        Right lower leg: Normal. No tenderness and no edema. Left lower leg: Normal. No tenderness and no edema. Neurological: The patient is alert and oriented. Moving all 4 extremities, sensation grossly intact bilateral  Skin: Skin is warm, dry and intact. Psychiatric: The patient has a normal mood and affect. Speech is normal and behavior is normal. Judgment and thought content normal. Cognition and memory are normal.     Assessment:  GERD secondary to prior bariatric surgery  Gastric Ulcer  Gastritis  Bloating    Plan:  PPI daily  Carafate  Try Metamucil for hard stools  Return in 1-2 months  He seems to be progressing and his nausea is improved.   We will recheck an EGD to evaluate as he still has

## 2018-08-13 ENCOUNTER — ANESTHESIA EVENT (OUTPATIENT)
Dept: OPERATING ROOM | Age: 73
DRG: 377 | End: 2018-08-13
Payer: MEDICARE

## 2018-08-13 ENCOUNTER — TELEPHONE (OUTPATIENT)
Dept: BARIATRICS/WEIGHT MGMT | Age: 73
End: 2018-08-13

## 2018-08-13 ENCOUNTER — ANESTHESIA (OUTPATIENT)
Dept: OPERATING ROOM | Age: 73
DRG: 377 | End: 2018-08-13
Payer: MEDICARE

## 2018-08-13 ENCOUNTER — HOSPITAL ENCOUNTER (OUTPATIENT)
Dept: PHYSICAL THERAPY | Facility: CLINIC | Age: 73
Setting detail: THERAPIES SERIES
Discharge: HOME | End: 2018-08-13
Payer: MEDICARE

## 2018-08-13 VITALS — OXYGEN SATURATION: 100 % | SYSTOLIC BLOOD PRESSURE: 101 MMHG | DIASTOLIC BLOOD PRESSURE: 67 MMHG

## 2018-08-13 PROBLEM — K92.0 HEMATEMESIS: Status: ACTIVE | Noted: 2018-08-13

## 2018-08-13 PROBLEM — K25.0 ACUTE GASTRIC ULCER WITH BLEEDING: Status: ACTIVE | Noted: 2018-08-13

## 2018-08-13 LAB
-: NORMAL
ABSOLUTE EOS #: 0.19 K/UL (ref 0–0.44)
ABSOLUTE IMMATURE GRANULOCYTE: <0.03 K/UL (ref 0–0.3)
ABSOLUTE LYMPH #: 1.21 K/UL (ref 1.1–3.7)
ABSOLUTE MONO #: 0.61 K/UL (ref 0.1–1.2)
ALBUMIN SERPL-MCNC: 3.2 G/DL (ref 3.5–5.2)
ALBUMIN/GLOBULIN RATIO: 1.1 (ref 1–2.5)
ALP BLD-CCNC: 64 U/L (ref 40–129)
ALT SERPL-CCNC: 5 U/L (ref 5–41)
AMORPHOUS: NORMAL
AMPHETAMINE SCREEN URINE: NEGATIVE
ANION GAP SERPL CALCULATED.3IONS-SCNC: 11 MMOL/L (ref 9–17)
AST SERPL-CCNC: 11 U/L
BACTERIA: NORMAL
BARBITURATE SCREEN URINE: NEGATIVE
BASOPHILS # BLD: 0 % (ref 0–2)
BASOPHILS ABSOLUTE: <0.03 K/UL (ref 0–0.2)
BENZODIAZEPINE SCREEN, URINE: NEGATIVE
BILIRUB SERPL-MCNC: 1 MG/DL (ref 0.3–1.2)
BILIRUBIN URINE: ABNORMAL
BUN BLDV-MCNC: 14 MG/DL (ref 8–23)
BUN/CREAT BLD: ABNORMAL (ref 9–20)
BUPRENORPHINE URINE: NORMAL
CALCIUM SERPL-MCNC: 8.7 MG/DL (ref 8.6–10.4)
CANNABINOID SCREEN URINE: NEGATIVE
CASTS UA: NORMAL /LPF (ref 0–2)
CHLORIDE BLD-SCNC: 100 MMOL/L (ref 98–107)
CO2: 29 MMOL/L (ref 20–31)
COCAINE METABOLITE, URINE: NEGATIVE
COLOR: ABNORMAL
COMMENT UA: ABNORMAL
CREAT SERPL-MCNC: 0.88 MG/DL (ref 0.7–1.2)
CRYSTALS, UA: NORMAL /HPF
DIFFERENTIAL TYPE: ABNORMAL
EOSINOPHILS RELATIVE PERCENT: 3 % (ref 1–4)
EPITHELIAL CELLS UA: NORMAL /HPF (ref 0–5)
GFR AFRICAN AMERICAN: >60 ML/MIN
GFR NON-AFRICAN AMERICAN: >60 ML/MIN
GFR SERPL CREATININE-BSD FRML MDRD: ABNORMAL ML/MIN/{1.73_M2}
GFR SERPL CREATININE-BSD FRML MDRD: ABNORMAL ML/MIN/{1.73_M2}
GLUCOSE BLD-MCNC: 114 MG/DL (ref 70–99)
GLUCOSE URINE: NEGATIVE
HCT VFR BLD CALC: 35 % (ref 40.7–50.3)
HCT VFR BLD CALC: 35.5 % (ref 40.7–50.3)
HCT VFR BLD CALC: 35.7 % (ref 40.7–50.3)
HCT VFR BLD CALC: 36.2 % (ref 40.7–50.3)
HEMOGLOBIN: 10.8 G/DL (ref 13–17)
HEMOGLOBIN: 10.8 G/DL (ref 13–17)
HEMOGLOBIN: 10.9 G/DL (ref 13–17)
HEMOGLOBIN: 11 G/DL (ref 13–17)
IMMATURE GRANULOCYTES: 0 %
KETONES, URINE: ABNORMAL
LEUKOCYTE ESTERASE, URINE: NEGATIVE
LYMPHOCYTES # BLD: 20 % (ref 24–43)
MAGNESIUM: 2.1 MG/DL (ref 1.6–2.6)
MCH RBC QN AUTO: 29.5 PG (ref 25.2–33.5)
MCHC RBC AUTO-ENTMCNC: 31.1 G/DL (ref 28.4–34.8)
MCV RBC AUTO: 94.9 FL (ref 82.6–102.9)
MDMA URINE: NORMAL
METHADONE SCREEN, URINE: NEGATIVE
METHAMPHETAMINE, URINE: NORMAL
MONOCYTES # BLD: 10 % (ref 3–12)
MUCUS: NORMAL
NITRITE, URINE: NEGATIVE
NRBC AUTOMATED: 0 PER 100 WBC
OPIATES, URINE: NEGATIVE
OTHER OBSERVATIONS UA: NORMAL
OXYCODONE SCREEN URINE: NEGATIVE
PARTIAL THROMBOPLASTIN TIME: 29.9 SEC (ref 20.5–30.5)
PDW BLD-RTO: 14.6 % (ref 11.8–14.4)
PH UA: 5.5 (ref 5–8)
PHENCYCLIDINE, URINE: NEGATIVE
PLATELET # BLD: 149 K/UL (ref 138–453)
PLATELET ESTIMATE: ABNORMAL
PMV BLD AUTO: 9 FL (ref 8.1–13.5)
POTASSIUM SERPL-SCNC: 3.5 MMOL/L (ref 3.7–5.3)
PROPOXYPHENE, URINE: NORMAL
PROTEIN UA: NEGATIVE
RBC # BLD: 3.69 M/UL (ref 4.21–5.77)
RBC # BLD: ABNORMAL 10*6/UL
RBC UA: NORMAL /HPF (ref 0–2)
RENAL EPITHELIAL, UA: NORMAL /HPF
SEG NEUTROPHILS: 66 % (ref 36–65)
SEGMENTED NEUTROPHILS ABSOLUTE COUNT: 3.94 K/UL (ref 1.5–8.1)
SODIUM BLD-SCNC: 140 MMOL/L (ref 135–144)
SPECIFIC GRAVITY UA: 1.02 (ref 1–1.03)
TEST INFORMATION: NORMAL
TOTAL PROTEIN: 6.1 G/DL (ref 6.4–8.3)
TRICHOMONAS: NORMAL
TRICYCLIC ANTIDEPRESSANTS, UR: NORMAL
TURBIDITY: CLEAR
URINE HGB: NEGATIVE
UROBILINOGEN, URINE: NORMAL
WBC # BLD: 6 K/UL (ref 3.5–11.3)
WBC # BLD: ABNORMAL 10*3/UL
WBC UA: NORMAL /HPF (ref 0–5)
YEAST: NORMAL

## 2018-08-13 PROCEDURE — 2580000003 HC RX 258: Performed by: STUDENT IN AN ORGANIZED HEALTH CARE EDUCATION/TRAINING PROGRAM

## 2018-08-13 PROCEDURE — 2500000003 HC RX 250 WO HCPCS: Performed by: STUDENT IN AN ORGANIZED HEALTH CARE EDUCATION/TRAINING PROGRAM

## 2018-08-13 PROCEDURE — 3609012400 HC EGD TRANSORAL BIOPSY SINGLE/MULTIPLE: Performed by: SURGERY

## 2018-08-13 PROCEDURE — G8979 MOBILITY GOAL STATUS: HCPCS

## 2018-08-13 PROCEDURE — 0DC48ZZ EXTIRPATION OF MATTER FROM ESOPHAGOGASTRIC JUNCTION, VIA NATURAL OR ARTIFICIAL OPENING ENDOSCOPIC: ICD-10-PCS | Performed by: SURGERY

## 2018-08-13 PROCEDURE — 80053 COMPREHEN METABOLIC PANEL: CPT

## 2018-08-13 PROCEDURE — 97530 THERAPEUTIC ACTIVITIES: CPT

## 2018-08-13 PROCEDURE — 6360000002 HC RX W HCPCS: Performed by: NURSE ANESTHETIST, CERTIFIED REGISTERED

## 2018-08-13 PROCEDURE — 94660 CPAP INITIATION&MGMT: CPT

## 2018-08-13 PROCEDURE — 2580000003 HC RX 258: Performed by: NURSE ANESTHETIST, CERTIFIED REGISTERED

## 2018-08-13 PROCEDURE — 83735 ASSAY OF MAGNESIUM: CPT

## 2018-08-13 PROCEDURE — 6370000000 HC RX 637 (ALT 250 FOR IP): Performed by: STUDENT IN AN ORGANIZED HEALTH CARE EDUCATION/TRAINING PROGRAM

## 2018-08-13 PROCEDURE — 94640 AIRWAY INHALATION TREATMENT: CPT

## 2018-08-13 PROCEDURE — 88305 TISSUE EXAM BY PATHOLOGIST: CPT

## 2018-08-13 PROCEDURE — G8978 MOBILITY CURRENT STATUS: HCPCS

## 2018-08-13 PROCEDURE — C9113 INJ PANTOPRAZOLE SODIUM, VIA: HCPCS | Performed by: STUDENT IN AN ORGANIZED HEALTH CARE EDUCATION/TRAINING PROGRAM

## 2018-08-13 PROCEDURE — 1200000000 HC SEMI PRIVATE

## 2018-08-13 PROCEDURE — 6360000002 HC RX W HCPCS: Performed by: STUDENT IN AN ORGANIZED HEALTH CARE EDUCATION/TRAINING PROGRAM

## 2018-08-13 PROCEDURE — 43247 EGD REMOVE FOREIGN BODY: CPT | Performed by: SURGERY

## 2018-08-13 PROCEDURE — 85018 HEMOGLOBIN: CPT

## 2018-08-13 PROCEDURE — 3609012900 HC EGD FOREIGN BODY REMOVAL: Performed by: SURGERY

## 2018-08-13 PROCEDURE — 2709999900 HC NON-CHARGEABLE SUPPLY: Performed by: SURGERY

## 2018-08-13 PROCEDURE — 85014 HEMATOCRIT: CPT

## 2018-08-13 PROCEDURE — 88342 IMHCHEM/IMCYTCHM 1ST ANTB: CPT

## 2018-08-13 PROCEDURE — C1773 RET DEV, INSERTABLE: HCPCS | Performed by: SURGERY

## 2018-08-13 PROCEDURE — 85025 COMPLETE CBC W/AUTO DIFF WBC: CPT

## 2018-08-13 PROCEDURE — 0DB68ZX EXCISION OF STOMACH, VIA NATURAL OR ARTIFICIAL OPENING ENDOSCOPIC, DIAGNOSTIC: ICD-10-PCS | Performed by: SURGERY

## 2018-08-13 PROCEDURE — 80307 DRUG TEST PRSMV CHEM ANLYZR: CPT

## 2018-08-13 PROCEDURE — 43239 EGD BIOPSY SINGLE/MULTIPLE: CPT | Performed by: SURGERY

## 2018-08-13 PROCEDURE — 3700000000 HC ANESTHESIA ATTENDED CARE: Performed by: SURGERY

## 2018-08-13 PROCEDURE — 81001 URINALYSIS AUTO W/SCOPE: CPT

## 2018-08-13 PROCEDURE — 7100000000 HC PACU RECOVERY - FIRST 15 MIN: Performed by: SURGERY

## 2018-08-13 PROCEDURE — 3700000001 HC ADD 15 MINUTES (ANESTHESIA): Performed by: SURGERY

## 2018-08-13 PROCEDURE — 2580000003 HC RX 258: Performed by: INTERNAL MEDICINE

## 2018-08-13 PROCEDURE — 97162 PT EVAL MOD COMPLEX 30 MIN: CPT

## 2018-08-13 PROCEDURE — 7100000001 HC PACU RECOVERY - ADDTL 15 MIN: Performed by: SURGERY

## 2018-08-13 PROCEDURE — 99223 1ST HOSP IP/OBS HIGH 75: CPT | Performed by: INTERNAL MEDICINE

## 2018-08-13 PROCEDURE — 36415 COLL VENOUS BLD VENIPUNCTURE: CPT

## 2018-08-13 PROCEDURE — 2500000003 HC RX 250 WO HCPCS: Performed by: NURSE ANESTHETIST, CERTIFIED REGISTERED

## 2018-08-13 RX ORDER — SODIUM CHLORIDE 0.9 % (FLUSH) 0.9 %
10 SYRINGE (ML) INJECTION EVERY 12 HOURS SCHEDULED
Status: DISCONTINUED | OUTPATIENT
Start: 2018-08-13 | End: 2018-08-19 | Stop reason: HOSPADM

## 2018-08-13 RX ORDER — SODIUM CHLORIDE 0.9 % (FLUSH) 0.9 %
10 SYRINGE (ML) INJECTION PRN
Status: DISCONTINUED | OUTPATIENT
Start: 2018-08-13 | End: 2018-08-19 | Stop reason: HOSPADM

## 2018-08-13 RX ORDER — IPRATROPIUM BROMIDE AND ALBUTEROL SULFATE 2.5; .5 MG/3ML; MG/3ML
1 SOLUTION RESPIRATORY (INHALATION) EVERY 4 HOURS
Status: DISCONTINUED | OUTPATIENT
Start: 2018-08-13 | End: 2018-08-14

## 2018-08-13 RX ORDER — METOPROLOL SUCCINATE 50 MG/1
50 TABLET, EXTENDED RELEASE ORAL DAILY
Status: CANCELLED | OUTPATIENT
Start: 2018-08-13

## 2018-08-13 RX ORDER — SODIUM CHLORIDE 9 MG/ML
INJECTION, SOLUTION INTRAVENOUS CONTINUOUS
Status: DISCONTINUED | OUTPATIENT
Start: 2018-08-13 | End: 2018-08-18

## 2018-08-13 RX ORDER — CYCLOBENZAPRINE HCL 10 MG
10 TABLET ORAL NIGHTLY PRN
Status: DISCONTINUED | OUTPATIENT
Start: 2018-08-13 | End: 2018-08-19 | Stop reason: HOSPADM

## 2018-08-13 RX ORDER — ARMODAFINIL 200 MG/1
1 TABLET ORAL EVERY MORNING
Status: DISCONTINUED | OUTPATIENT
Start: 2018-08-13 | End: 2018-08-19 | Stop reason: HOSPADM

## 2018-08-13 RX ORDER — POTASSIUM CHLORIDE 20 MEQ/1
40 TABLET, EXTENDED RELEASE ORAL PRN
Status: DISCONTINUED | OUTPATIENT
Start: 2018-08-13 | End: 2018-08-19 | Stop reason: HOSPADM

## 2018-08-13 RX ORDER — PROMETHAZINE HYDROCHLORIDE 25 MG/ML
6.25 INJECTION, SOLUTION INTRAMUSCULAR; INTRAVENOUS EVERY 6 HOURS PRN
Status: DISCONTINUED | OUTPATIENT
Start: 2018-08-13 | End: 2018-08-16

## 2018-08-13 RX ORDER — POTASSIUM CHLORIDE 7.45 MG/ML
10 INJECTION INTRAVENOUS PRN
Status: DISCONTINUED | OUTPATIENT
Start: 2018-08-13 | End: 2018-08-19 | Stop reason: HOSPADM

## 2018-08-13 RX ORDER — MEPERIDINE HYDROCHLORIDE 50 MG/ML
12.5 INJECTION INTRAMUSCULAR; INTRAVENOUS; SUBCUTANEOUS EVERY 5 MIN PRN
Status: DISCONTINUED | OUTPATIENT
Start: 2018-08-13 | End: 2018-08-13 | Stop reason: HOSPADM

## 2018-08-13 RX ORDER — POLYETHYLENE GLYCOL 3350 17 G/17G
17 POWDER, FOR SOLUTION ORAL DAILY
Status: DISCONTINUED | OUTPATIENT
Start: 2018-08-13 | End: 2018-08-19 | Stop reason: HOSPADM

## 2018-08-13 RX ORDER — MAGNESIUM SULFATE 1 G/100ML
1 INJECTION INTRAVENOUS PRN
Status: DISCONTINUED | OUTPATIENT
Start: 2018-08-13 | End: 2018-08-19 | Stop reason: HOSPADM

## 2018-08-13 RX ORDER — ALBUTEROL SULFATE 90 UG/1
2 AEROSOL, METERED RESPIRATORY (INHALATION) EVERY 6 HOURS PRN
Status: DISCONTINUED | OUTPATIENT
Start: 2018-08-13 | End: 2018-08-19 | Stop reason: HOSPADM

## 2018-08-13 RX ORDER — METOCLOPRAMIDE HYDROCHLORIDE 5 MG/5ML
5 SOLUTION ORAL
Status: DISCONTINUED | OUTPATIENT
Start: 2018-08-13 | End: 2018-08-19 | Stop reason: HOSPADM

## 2018-08-13 RX ORDER — LIDOCAINE HYDROCHLORIDE 10 MG/ML
INJECTION, SOLUTION EPIDURAL; INFILTRATION; INTRACAUDAL; PERINEURAL PRN
Status: DISCONTINUED | OUTPATIENT
Start: 2018-08-13 | End: 2018-08-13 | Stop reason: SDUPTHER

## 2018-08-13 RX ORDER — PROPOFOL 10 MG/ML
INJECTION, EMULSION INTRAVENOUS PRN
Status: DISCONTINUED | OUTPATIENT
Start: 2018-08-13 | End: 2018-08-13 | Stop reason: SDUPTHER

## 2018-08-13 RX ORDER — SUCCINYLCHOLINE CHLORIDE 20 MG/ML
INJECTION INTRAMUSCULAR; INTRAVENOUS PRN
Status: DISCONTINUED | OUTPATIENT
Start: 2018-08-13 | End: 2018-08-13 | Stop reason: SDUPTHER

## 2018-08-13 RX ORDER — CETIRIZINE HYDROCHLORIDE 10 MG/1
10 TABLET ORAL DAILY
Status: DISCONTINUED | OUTPATIENT
Start: 2018-08-13 | End: 2018-08-19 | Stop reason: HOSPADM

## 2018-08-13 RX ORDER — PANTOPRAZOLE SODIUM 40 MG/1
40 TABLET, DELAYED RELEASE ORAL
Status: DISCONTINUED | OUTPATIENT
Start: 2018-08-14 | End: 2018-08-19 | Stop reason: HOSPADM

## 2018-08-13 RX ORDER — BUMETANIDE 1 MG/1
1 TABLET ORAL 2 TIMES DAILY
Status: CANCELLED | OUTPATIENT
Start: 2018-08-13

## 2018-08-13 RX ORDER — ALBUTEROL SULFATE 2.5 MG/3ML
2.5 SOLUTION RESPIRATORY (INHALATION) EVERY 4 HOURS PRN
Status: DISCONTINUED | OUTPATIENT
Start: 2018-08-13 | End: 2018-08-14

## 2018-08-13 RX ORDER — PANTOPRAZOLE SODIUM 40 MG/1
40 TABLET, DELAYED RELEASE ORAL 2 TIMES DAILY
Status: CANCELLED | OUTPATIENT
Start: 2018-08-13

## 2018-08-13 RX ORDER — MULTIVITAMIN WITH FOLIC ACID 400 MCG
1 TABLET ORAL DAILY
Status: DISCONTINUED | OUTPATIENT
Start: 2018-08-13 | End: 2018-08-19 | Stop reason: HOSPADM

## 2018-08-13 RX ORDER — PANTOPRAZOLE SODIUM 40 MG/1
40 TABLET, DELAYED RELEASE ORAL DAILY
Status: CANCELLED | OUTPATIENT
Start: 2018-08-13

## 2018-08-13 RX ORDER — ONDANSETRON 4 MG/1
4 TABLET, ORALLY DISINTEGRATING ORAL EVERY 4 HOURS PRN
Status: DISCONTINUED | OUTPATIENT
Start: 2018-08-13 | End: 2018-08-19 | Stop reason: HOSPADM

## 2018-08-13 RX ORDER — ONDANSETRON 2 MG/ML
4 INJECTION INTRAMUSCULAR; INTRAVENOUS EVERY 6 HOURS PRN
Status: DISCONTINUED | OUTPATIENT
Start: 2018-08-13 | End: 2018-08-19 | Stop reason: HOSPADM

## 2018-08-13 RX ORDER — GABAPENTIN 300 MG/1
300 CAPSULE ORAL 2 TIMES DAILY
Status: DISCONTINUED | OUTPATIENT
Start: 2018-08-13 | End: 2018-08-19 | Stop reason: HOSPADM

## 2018-08-13 RX ORDER — SUCRALFATE 1 G/1
1 TABLET ORAL 4 TIMES DAILY
Status: DISCONTINUED | OUTPATIENT
Start: 2018-08-13 | End: 2018-08-19 | Stop reason: HOSPADM

## 2018-08-13 RX ORDER — SODIUM CHLORIDE 9 MG/ML
INJECTION, SOLUTION INTRAVENOUS CONTINUOUS PRN
Status: DISCONTINUED | OUTPATIENT
Start: 2018-08-13 | End: 2018-08-13 | Stop reason: SDUPTHER

## 2018-08-13 RX ORDER — POTASSIUM CHLORIDE 20MEQ/15ML
40 LIQUID (ML) ORAL PRN
Status: DISCONTINUED | OUTPATIENT
Start: 2018-08-13 | End: 2018-08-19 | Stop reason: HOSPADM

## 2018-08-13 RX ORDER — CIPROFLOXACIN 2 MG/ML
400 INJECTION, SOLUTION INTRAVENOUS EVERY 12 HOURS
Status: DISCONTINUED | OUTPATIENT
Start: 2018-08-13 | End: 2018-08-13

## 2018-08-13 RX ADMIN — MOMETASONE FUROATE AND FORMOTEROL FUMARATE DIHYDRATE 2 PUFF: 200; 5 AEROSOL RESPIRATORY (INHALATION) at 21:24

## 2018-08-13 RX ADMIN — MOMETASONE FUROATE AND FORMOTEROL FUMARATE DIHYDRATE 2 PUFF: 200; 5 AEROSOL RESPIRATORY (INHALATION) at 09:14

## 2018-08-13 RX ADMIN — SODIUM CHLORIDE: 9 INJECTION, SOLUTION INTRAVENOUS at 10:57

## 2018-08-13 RX ADMIN — ALBUTEROL SULFATE 2 MG: 2 TABLET ORAL at 14:52

## 2018-08-13 RX ADMIN — CIPROFLOXACIN 400 MG: 2 INJECTION, SOLUTION INTRAVENOUS at 05:12

## 2018-08-13 RX ADMIN — SODIUM CHLORIDE 8 MG/HR: 9 INJECTION, SOLUTION INTRAVENOUS at 17:23

## 2018-08-13 RX ADMIN — Medication 10 ML: at 22:46

## 2018-08-13 RX ADMIN — Medication 10 ML: at 22:47

## 2018-08-13 RX ADMIN — SUCRALFATE 1 G: 1 TABLET ORAL at 14:52

## 2018-08-13 RX ADMIN — SODIUM CHLORIDE: 9 INJECTION, SOLUTION INTRAVENOUS at 11:30

## 2018-08-13 RX ADMIN — IPRATROPIUM BROMIDE AND ALBUTEROL SULFATE 3 ML: .5; 3 SOLUTION RESPIRATORY (INHALATION) at 04:10

## 2018-08-13 RX ADMIN — THERA TABS 1 TABLET: TAB at 14:52

## 2018-08-13 RX ADMIN — MICONAZOLE NITRATE: 20.6 POWDER TOPICAL at 22:46

## 2018-08-13 RX ADMIN — METRONIDAZOLE 500 MG: 500 INJECTION, SOLUTION INTRAVENOUS at 03:57

## 2018-08-13 RX ADMIN — SUCCINYLCHOLINE CHLORIDE 100 MG: 20 INJECTION, SOLUTION INTRAMUSCULAR; INTRAVENOUS at 11:03

## 2018-08-13 RX ADMIN — SODIUM CHLORIDE 8 MG/HR: 9 INJECTION, SOLUTION INTRAVENOUS at 07:15

## 2018-08-13 RX ADMIN — CETIRIZINE HYDROCHLORIDE 10 MG: 10 TABLET ORAL at 14:51

## 2018-08-13 RX ADMIN — PROMETHAZINE HYDROCHLORIDE 6.25 MG: 25 INJECTION INTRAMUSCULAR; INTRAVENOUS at 23:23

## 2018-08-13 RX ADMIN — METOCLOPRAMIDE HYDROCHLORIDE 5 MG: 5 SOLUTION ORAL at 18:31

## 2018-08-13 RX ADMIN — ONDANSETRON 4 MG: 4 TABLET, ORALLY DISINTEGRATING ORAL at 21:12

## 2018-08-13 RX ADMIN — GABAPENTIN 300 MG: 300 CAPSULE ORAL at 14:52

## 2018-08-13 RX ADMIN — IPRATROPIUM BROMIDE AND ALBUTEROL SULFATE 3 ML: .5; 3 SOLUTION RESPIRATORY (INHALATION) at 16:54

## 2018-08-13 RX ADMIN — LIDOCAINE HYDROCHLORIDE 30 MG: 10 INJECTION, SOLUTION EPIDURAL; INFILTRATION; INTRACAUDAL at 11:03

## 2018-08-13 RX ADMIN — SODIUM CHLORIDE: 9 INJECTION, SOLUTION INTRAVENOUS at 09:08

## 2018-08-13 RX ADMIN — POLYETHYLENE GLYCOL 3350 17 G: 17 POWDER, FOR SOLUTION ORAL at 14:51

## 2018-08-13 RX ADMIN — IPRATROPIUM BROMIDE AND ALBUTEROL SULFATE 3 ML: .5; 3 SOLUTION RESPIRATORY (INHALATION) at 09:14

## 2018-08-13 RX ADMIN — IPRATROPIUM BROMIDE AND ALBUTEROL SULFATE 3 ML: .5; 3 SOLUTION RESPIRATORY (INHALATION) at 21:24

## 2018-08-13 RX ADMIN — PROPOFOL 100 MG: 10 INJECTION, EMULSION INTRAVENOUS at 11:03

## 2018-08-13 ASSESSMENT — PULMONARY FUNCTION TESTS
PIF_VALUE: 27
PIF_VALUE: 23
PIF_VALUE: 24
PIF_VALUE: 21
PIF_VALUE: 22
PIF_VALUE: 23
PIF_VALUE: 27
PIF_VALUE: 23
PIF_VALUE: 22
PIF_VALUE: 22
PIF_VALUE: 25
PIF_VALUE: 1
PIF_VALUE: 17
PIF_VALUE: 23
PIF_VALUE: 25
PIF_VALUE: 25
PIF_VALUE: 20
PIF_VALUE: 2
PIF_VALUE: 23
PIF_VALUE: 20
PIF_VALUE: 27
PIF_VALUE: 22
PIF_VALUE: 24
PIF_VALUE: 23
PIF_VALUE: 1
PIF_VALUE: 23
PIF_VALUE: 24
PIF_VALUE: 19
PIF_VALUE: 17
PIF_VALUE: 22
PIF_VALUE: 23
PIF_VALUE: 23
PIF_VALUE: 22
PIF_VALUE: 2
PIF_VALUE: 1
PIF_VALUE: 22
PIF_VALUE: 22
PIF_VALUE: 20
PIF_VALUE: 1
PIF_VALUE: 22
PIF_VALUE: 22
PIF_VALUE: 23
PIF_VALUE: 22
PIF_VALUE: 23
PIF_VALUE: 22
PIF_VALUE: 2
PIF_VALUE: 22
PIF_VALUE: 24
PIF_VALUE: 23
PIF_VALUE: 25
PIF_VALUE: 23
PIF_VALUE: 22
PIF_VALUE: 23
PIF_VALUE: 24
PIF_VALUE: 19
PIF_VALUE: 1
PIF_VALUE: 22
PIF_VALUE: 26
PIF_VALUE: 17
PIF_VALUE: 22
PIF_VALUE: 22
PIF_VALUE: 1
PIF_VALUE: 22
PIF_VALUE: 1
PIF_VALUE: 22
PIF_VALUE: 23
PIF_VALUE: 21
PIF_VALUE: 22
PIF_VALUE: 22
PIF_VALUE: 23
PIF_VALUE: 23
PIF_VALUE: 24
PIF_VALUE: 22
PIF_VALUE: 23
PIF_VALUE: 22
PIF_VALUE: 23
PIF_VALUE: 22
PIF_VALUE: 24
PIF_VALUE: 2
PIF_VALUE: 2
PIF_VALUE: 23
PIF_VALUE: 22
PIF_VALUE: 1

## 2018-08-13 ASSESSMENT — PAIN SCALES - GENERAL
PAINLEVEL_OUTOF10: 1
PAINLEVEL_OUTOF10: 0

## 2018-08-13 ASSESSMENT — ENCOUNTER SYMPTOMS
SORE THROAT: 0
CONSTIPATION: 0
SHORTNESS OF BREATH: 1
ABDOMINAL PAIN: 1
PHOTOPHOBIA: 0
ABDOMINAL DISTENTION: 0
BACK PAIN: 0
NAUSEA: 1
VOMITING: 1
DIARRHEA: 0
BLOOD IN STOOL: 0
COUGH: 0

## 2018-08-13 ASSESSMENT — PAIN DESCRIPTION - PAIN TYPE: TYPE: ACUTE PAIN

## 2018-08-13 ASSESSMENT — PAIN - FUNCTIONAL ASSESSMENT: PAIN_FUNCTIONAL_ASSESSMENT: 0-10

## 2018-08-13 ASSESSMENT — PAIN DESCRIPTION - LOCATION: LOCATION: ABDOMEN;BACK

## 2018-08-13 NOTE — PROGRESS NOTES
Pt had a large food bolus which was removed during EGD.     Ok for a diet  Reglan 5 mg q6 prn  carafate 1g QID  Protonix 40 mg QD  Ok to stop protonix gtt    [address],   08/13/18

## 2018-08-13 NOTE — PLAN OF CARE
Problem: Falls - Risk of:  Goal: Will remain free from falls  Will remain free from falls   Outcome: Ongoing  Pt remains free from falls. Pt assessed as high fall risk. Fall precautions in place including fall star posted, bed locked in lowest position, non skid slippers in place, bed alarm activated, side rails x2, call light in reach and room free from clutter. Will continue to monitor and reinforce use of call light with hourly rounding.

## 2018-08-13 NOTE — ANESTHESIA PRE PROCEDURE
([address]     GERD (gastroesophageal reflux disease)     Hyperlipidemia     Hypertension     Obesity     Osteoarthritis     Unspecified sleep apnea        Past Surgical History:        Procedure Laterality Date    CARPAL TUNNEL RELEASE      bilateral    COLONOSCOPY      FINGER AMPUTATION  1966    first knuckle right index finger    GASTRIC BYPASS SURGERY  1985    vertical banded gastroplasty    HEMORRHOID SURGERY      JOINT REPLACEMENT  2004 & 2005    bilateral knees    UT EGD TRANSORAL BIOPSY SINGLE/MULTIPLE N/A 5/25/2018    EGD BIOPSY performed by Gino Pedraza DO at [address] [facility]      left shoulder       Social History:    Social History   Substance Use Topics    Smoking status: Former Smoker     Packs/day: 1.00     Years: 20.00     Quit date: 12/14/1976    Smokeless tobacco: Never Used    Alcohol use No                                Counseling given: Not Answered      Vital Signs (Current):   Vitals:    08/13/18 0418 08/13/18 0517 08/13/18 0533 08/13/18 0917   BP: (!) 102/51      Pulse: 59      Resp: 14 14  22   Temp: 98 °F (36.7 °C)      TempSrc: Oral      SpO2: 100%   99%   Weight:   232 lb 12.9 oz (105.6 kg)    Height:                                                  BP Readings from Last 3 Encounters:   08/13/18 (!) 102/51   08/09/18 112/68   07/06/18 106/65       NPO Status: Time of last liquid consumption: 1800                        Time of last solid consumption: 1200                        Date of last liquid consumption: 08/12/18                        Date of last solid food consumption: 08/11/18    BMI:   Wt Readings from Last 3 Encounters:   08/13/18 232 lb 12.9 oz (105.6 kg)   08/09/18 239 lb (108.4 kg)   07/06/18 238 lb 3.2 oz (108 kg)     Body mass index is 33.4 kg/m².     CBC:   Lab Results   Component Value Date    WBC 6.0 08/13/2018    RBC 3.69 08/13/2018    RBC 4.39 09/13/2011    HGB 10.9 08/13/2018    HCT 35.0 08/13/2018    MCV 94.9 08/13/2018 RDW 14.6 08/13/2018     08/13/2018     09/13/2011       CMP:   Lab Results   Component Value Date     08/13/2018    K 3.5 08/13/2018     08/13/2018    CO2 29 08/13/2018    BUN 14 08/13/2018    CREATININE 0.88 08/13/2018    GFRAA >60 08/13/2018    LABGLOM >60 08/13/2018    GLUCOSE 114 08/13/2018    GLUCOSE 99 09/13/2011    PROT 6.1 08/13/2018    CALCIUM 8.7 08/13/2018    BILITOT 1.00 08/13/2018    ALKPHOS 64 08/13/2018    AST 11 08/13/2018    ALT 5 08/13/2018       POC Tests: No results for input(s): POCGLU, POCNA, POCK, POCCL, POCBUN, POCHEMO, POCHCT in the last 72 hours. Coags:   Lab Results   Component Value Date    PROTIME 14.3 08/12/2018    INR 1.4 08/12/2018    APTT 29.9 08/12/2018       HCG (If Applicable): No results found for: PREGTESTUR, PREGSERUM, HCG, HCGQUANT     ABGs: No results found for: PHART, PO2ART, HUB8UHQ, ALV2QUL, BEART, H6BAYNJK     Type & Screen (If Applicable):  No results found for: LABABO, [address]    Anesthesia Evaluation  Patient summary reviewed and Nursing notes reviewed no history of anesthetic complications:   Airway: Mallampati: II  TM distance: >3 FB   Neck ROM: full  Mouth opening: > = 3 FB Dental:    (+) edentulous      Pulmonary:normal exam    (+) COPD:  sleep apnea:  asthma:                            Cardiovascular:  Exercise tolerance: poor (<4 METS),   (+) hypertension:, dysrhythmias: atrial fibrillation,     (-)  angina,  CHF and orthopnea       Beta Blocker:  Not on Beta Blocker         Neuro/Psych:   Negative Neuro/Psych ROS              GI/Hepatic/Renal:   (+) GERD:, PUD,           Endo/Other:    (+) blood dyscrasia: anemia and anticoagulation therapy:., .                 Abdominal:           Vascular:                                        Anesthesia Plan      general     ASA 3       Induction: rapid sequence and intravenous. Anesthetic plan and risks discussed with patient.       Plan discussed with CRNA and surgical

## 2018-08-13 NOTE — PROGRESS NOTES
[address] [place] [address]     Department of Internal Medicine - Staff Internal Medicine Service     DAILY PROGRESS NOTE       Patient:  Thuan Levy  YOB: 1945  MRN: [id number]     Acct: [de-identified]     Admit date: 8/12/2018  Admitting Diagnosis: <principal problem not specified>    Subjective:   Patient seen and examined at bedside. No acute events overnight. Stable hemodynamics. Alert and oriented. Patient states that his abdominal pain has improve a lot and is ready for th scope this afternoon. No bloody vomiting episode overnight.      Objective:   BP (!) 102/51   Pulse 59   Temp 98 °F (36.7 °C) (Oral)   Resp 14   Ht 5' 10\" (1.778 m)   Wt 232 lb 12.9 oz (105.6 kg)   SpO2 100%   BMI 33.40 kg/m²       General appearance - alert, well appearing, and in no distress  Chest - clear to auscultation, no wheezes, rales or rhonchi, symmetric air entry  Heart - normal rate, regular rhythm, normal S1, S2, no murmurs, rubs, clicks or gallops  Abdomen - soft, nontender, nondistended, no masses or organomegaly  Neurological - alert, oriented, normal speech, no focal findings or movement disorder noted  Musculoskeletal - no joint tenderness, deformity or swelling  Extremities - peripheral pulses normal, no pedal edema, no clubbing or cyanosis  Skin - normal coloration and turgor, no rashes, no suspicious skin lesions noted      Intake/Output Summary (Last 24 hours) at 08/13/18 0821  Last data filed at 08/13/18 0513   Gross per 24 hour   Intake           285.17 ml   Output              300 ml   Net           -14.83 ml         Medications:      sodium chloride flush  10 mL Intravenous 2 times per day    albuterol  2 mg Oral BID    cetirizine  10 mg Oral Daily    ciclopirox   Topical Nightly    gabapentin  300 mg Oral BID    ipratropium-albuterol  1 vial Inhalation Q4H    multivitamin  1 tablet Oral Daily    polyethylene glycol  17 g Oral Daily    sucralfate  1 g Oral 4x Daily    sodium chloride flush  10 mL Intravenous 2 times per day    Armodafinil  1 tablet Mouth/Throat QAM    mometasone-formoterol  2 puff Inhalation BID    ciprofloxacin  400 mg Intravenous Q12H    metroNIDAZOLE  500 mg Intravenous Q8H    [START ON 8/14/2018] pneumococcal 13-valent conjugate  0.5 mL Intramuscular Once       Diagnostic Labs and Imaging    CBC: Recent Labs      08/12/18 2033 08/13/18   0308  08/13/18   0639   WBC  11.0   --   6.0   RBC  4.11*   --   3.69*   HGB  12.0*  11.0*  10.9*   HCT  38.3*  35.5*  35.0*   MCV  93.2   --   94.9   RDW  14.6*   --   14.6*   PLT  210   --   149     BMP: Recent Labs      08/12/18 2033 08/13/18   0639   NA  137  140   K  3.7  3.5*   CL  98  100   CO2  28  29   BUN  15  14   CREATININE  0.84  0.88     BNP: No results for input(s): BNP in the last 72 hours. PT/INR:   Recent Labs      08/12/18 2033   PROTIME  14.3*   INR  1.4     APTT:   Recent Labs      08/12/18 2033   APTT  29.9     CARDIAC ENZYMES: Recent Labs      08/12/18 2035 08/12/18   2255   TROPONINI  0.02  0.03     FASTING LIPID PANEL:  Lab Results   Component Value Date    CHOL 157 07/06/2018    HDL 52 07/06/2018    TRIG 101 07/06/2018     LIVER PROFILE: Recent Labs      08/12/18 2033 08/13/18   0639   AST  14  11   ALT  6  5   BILIDIR  0.38*   --    BILITOT  1.09  1.00   ALKPHOS  74  64        Assessment and Plan:   1. Acute gastric ulcer bleeding due to an Xarelto. Continue IV Protonix. Stop Cipro and Flagyl. Surgery to scope today.-  2. Acute blood loss anemia. Hemoglobin is currently stable at 10.9. We'll continue to monitor. 3. COPD- Home doses of inhalers resumed  4. GERD- on protonix 80 mg  5.  DVT Prophylaxis- Intermittent Pneumatic Compression Device    Julio Tijerina MD  PGY-1, Department of Internal Medicine  [address] [place], [place]  8/13/2018 8:24 AM      Attending Physician Statement  I have discussed the case, including pertinent history and exam findings with the resident and the team.  I have seen and examined the patient and the key elements of the encounter have been performed by me. I agree with the assessment, plan and orders as documented by the resident.       Marisela Liu MD, CHARLENE, [address]  Attending Physician, Internal Medicine Service    Internal Medicine Residency Program  8/13/2018, 11:59 AM

## 2018-08-13 NOTE — PROGRESS NOTES
Surgery Progress Note            PATIENT NAME: Gisela Benitez     TODAY'S DATE: 8/13/2018, 6:41 AM    SUBJECTIVE:    Pt  Seen and examined. No acute events overnight. Pt denies any N/V this AM. Hasn't had any emesis since arrival. Hgb stable. Afebrile and hemodynamically stable. OBJECTIVE:   VITALS:  BP (!) 102/51   Pulse 59   Temp 98 °F (36.7 °C) (Oral)   Resp 14   Ht 5' 10\" (1.778 m)   Wt 232 lb 12.9 oz (105.6 kg)   SpO2 100%   BMI 33.40 kg/m²      INTAKE/OUTPUT:      Intake/Output Summary (Last 24 hours) at 08/13/18 0641  Last data filed at 08/13/18 0513   Gross per 24 hour   Intake           285.17 ml   Output              300 ml   Net           -14.83 ml       PHYSICAL EXAM  General Appearance:  awake, alert, oriented, in no acute distress  Lungs:  Normal expansion. Clear to auscultation. No rales, rhonchi, or wheezing. Heart:  Heart regular rate and rhythm  Abdomen:  Soft, non distended, mild tenderness in epigastric region, no peritoneal signs  Musculoskeletal:  No joint swelling, deformity, or tenderness.          Data:  CBC with Differential:    Lab Results   Component Value Date    WBC 11.0 08/12/2018    RBC 4.11 08/12/2018    RBC 4.39 09/13/2011    HGB 11.0 08/13/2018    HCT 35.5 08/13/2018     08/12/2018     09/13/2011    MCV 93.2 08/12/2018    MCH 29.2 08/12/2018    MCHC 31.3 08/12/2018    RDW 14.6 08/12/2018    LYMPHOPCT 18 07/06/2018    LYMPHOPCT 21.2 10/30/2013    MONOPCT 11 07/06/2018    MONOPCT 7.9 10/30/2013    EOSPCT 7.9 10/30/2013    BASOPCT 0 07/06/2018    BASOPCT 0.7 10/30/2013    MONOSABS 0.80 07/06/2018    MONOSABS 0.5 10/30/2013    LYMPHSABS 1.30 07/06/2018    LYMPHSABS 1.4 10/30/2013    EOSABS 0.30 07/06/2018    EOSABS 0.5 10/30/2013    BASOSABS 0.00 07/06/2018    DIFFTYPE NOT REPORTED 07/06/2018     BMP:    Lab Results   Component Value Date     08/12/2018    K 3.7 08/12/2018    CL 98 08/12/2018    CO2 28 08/12/2018    BUN 15 08/12/2018    LABALBU 3.6 08/12/2018    CREATININE 0.84 08/12/2018    CALCIUM 8.8 08/12/2018    GFRAA >60 08/12/2018    LABGLOM >60 08/12/2018    GLUCOSE 109 08/12/2018    GLUCOSE 99 09/13/2011       Radiology Review:      ASSESSMENT   1. Gastric ulcer  2. Hx of vertical banded gastroplasty     Plan  1. Pain control  2. Hgb's are stable - daily Hgb checks  3. PPI- currently on protonix gtt per medicine  4. Symptom control  5. Hold Xarelto   6. EGD today  7.  NPO  8. Medical management per primary    Electronically signed by Anitra Murillo DO  on 8/13/2018 at 6:41 AM

## 2018-08-13 NOTE — TELEPHONE ENCOUNTER
Patient called and cancelled their EGD scheduled for 8-17-18  (if patient does not want to reschedule please include cancel reason)       It has been rescheduled for 8-13-18, patient currently admitted at [facility]

## 2018-08-13 NOTE — ED PROVIDER NOTES
disposition of the patient as recorded by the APC.     Naheed Gibson MD  Attending Emergency  Physician        Shagufta Mauricio MD  08/12/18 1926

## 2018-08-13 NOTE — FLOWSHEET NOTE
[] [facility]        Outpatient Physical                Therapy       [address]       Phone: [phone]       Fax: [phone] [x] [facility]       Promotion at [address] [facility]       Phone: [phone]       Fax: [phone] [] [facility]      for Health Promotion     [address] [facility]      Phone: [phone]      Fax:  [phone] [facility] Outpatient    [address] [facility] [address]  Phone [phone]  Fax  [phone]     Physical Therapy Cancel/No Show note    Date: 2018  Patient: Coty Kulkarni  : 1945  MRN: [id number]    Cancels/No Shows to date:     For today's appointment patient:  [x]  Cancelled  []  Rescheduled appointment  []  No-show     Reason given by patient:  [x]  Patient ill  []  Conflicting appointment  []  No transportation    []  Conflict with work  []  No reason given  []  Weather related  []  Other:      Comments: pt is currently in hospital and cancelled today as well as Thursday's appointment, will call when able to come back to therapy.        []  Next appointment was confirmed    Electronically signed by: Virgil Sands PTA

## 2018-08-13 NOTE — OP NOTE
8/13/2018     Preoperative Diagnosis:    GERD  Status post vertical banded gastroplasty  Possible gastric Ulcer     Postoperative Diagnosis:   GERD with evidence of Hernandez's esophagus  Status post Bariatric Surgery - Vertical Banded Gastroplasty  Gastritis with impacted food bolus in gastroplasty pouch  Antral Gastritis     Procedure:   Esophagogastroduodenoscopy with removal of food bolus  Esophagogastroduodenoscopy with biopsy     Surgeon: Charles Carroll DO     Assistant: Bibi Kendall DO     Anesthesia: ETT, see anesthesia records     Specimen:     1) Antrum     Findings:  See below     Operative Narrative: The risks and benefits were explained in detail to the patient who agreed and consented to the procedure. The patient was taken to the OR suite and placed in a lateral position after general anesthesia had been administered       The endoscope was then advanced into the oropharynx and down into the esophagus under direct visualization. The scope was further advanced through the esophagus, GE junction and stomach to the pylorus under visualization. The scope was passed through the pylorus and duodenal sweep performed, advancing and visualizing to the second portion of the duodenum. The scope was then withdrawn to the antrum. .  The scope was then retroflexed to visualize the GE junction. Evidence of a hiatal hernia was not noted. There was prior VBG and the remnant had a minimal amount of food in it that we irrigated. We were able to remove the majority of the food in this region, however, could not irrigate all the food out to complete full visualizaiton. The scope was then slowly withdrawn through the banded region and the pouch was very erythematous proximally with an impacted food bolus. There was gastrities at proximal portion of the pouch in the area of the food bolus. The antrum then biopsied with cold forceps, hemostasis noted. The Z line was noted and irregular.  We then

## 2018-08-13 NOTE — PROGRESS NOTES
Physical Therapy    Facility/Department: [facility] OR  Initial Assessment    NAME: Luella Hodgkins  : 1945  MRN: [id number]    Chief Complaint   Patient presents with    Abdominal Pain     epigastric     Emesis    Nausea       Date of Service: 2018    Discharge Recommendations:  Continue Outpatient PT, Home with assist PRN   PT Equipment Recommendations  Equipment Needed: No    Patient Diagnosis(es): The encounter diagnosis was Hematemesis with nausea. has a past medical history of Asthma; Atrial fibrillation ([address] GERD (gastroesophageal reflux disease); Hyperlipidemia; Hypertension; Obesity; Osteoarthritis; and Unspecified sleep apnea. has a past surgical history that includes Finger amputation (); Gastric bypass surgery (); Carpal tunnel release; Colonoscopy; Rotator cuff repair; joint replacement ( & ); Hemorrhoid surgery; and pr egd transoral biopsy single/multiple (N/A, 2018). Restrictions  Restrictions/Precautions  Restrictions/Precautions: Fall Risk  Required Braces or Orthoses?: Yes (Pt has an ankle foot orthotic in his shoe that he needs for ambulation)  Position Activity Restriction  Other position/activity restrictions: Up as tolerated  Vision/Hearing  Vision: Impaired  Vision Exceptions: Wears glasses at all times  Hearing: Within functional limits     Subjective  General  Patient assessed for rehabilitation services?: Yes  Response To Previous Treatment: Not applicable  Family / Caregiver Present: No  Follows Commands: Within Functional Limits  Subjective  Subjective: Pt and RN agreeable to treat this date. Pt was resting in bed upon arrival.   Pain Screening  Patient Currently in Pain: Yes  Pain Assessment  Pain Assessment: 0-10  Pain Level: 1  Pain Type: Acute pain  Pain Location: Abdomen;Back  Pain Intervention(s): Ambulation/Increased activity; Distraction  Response to Pain Intervention: Patient Satisfied  Vital Signs  Patient Currently in Pain: Yes Orientation  Orientation  Overall Orientation Status: Within Normal Limits    Social/Functional History  Social/Functional History  Lives With: Family (wife and 3 adult daughters)  Type of Home: House  Home Layout: Two level, Able to Live on Main level with bedroom/bathroom  Home Access: Stairs to enter with rails  Entrance Stairs - Number of Steps: 5  Entrance Stairs - Rails: Right (house is on the L side and pt reports he can use it)  Bathroom Shower/Tub: Tub/Shower unit  Bathroom Toilet: Handicap height  Bathroom Equipment: Shower chair, Grab bars around toilet, Grab bars in shower  Bathroom Accessibility: Accessible  Home Equipment: 4 wheeled walker  Receives Help From: Family  ADL Assistance: [address] [place] [address] Avenue: Independent  Homemaking Responsibilities: Yes (Shares some responsibility with wife and daughters)  Ambulation Assistance: Independent (With use of 4-wheeled walker)  Transfer Assistance: Independent  Active : Yes  Mode of Transportation: Car  Occupation: Retired  Type of occupation: Special   Leisure & Hobbies: Talking to new people, reading   Objective     Observation/Palpation  Posture: Good    AROM RLE (degrees)  RLE AROM: WFL  AROM LLE (degrees)  LLE AROM : WFL  AROM RUE (degrees)  RUE AROM : WFL  AROM LUE (degrees)  LUE AROM : WFL  LUE General AROM: Except shoulder flexion and abduction - approximately 90 degrees secondary to reported RTC surgeries   Strength RLE  Strength RLE: WFL  Comment: Grossly 4-/5 except right ankle DF/PF 1/5  Strength LLE  Strength LLE: WFL  Comment: Grossly 4-/5   Strength RUE  Strength RUE: WFL  Comment: Grossly 4-/5   Strength LUE  Strength LUE: WFL  Comment: Grossly 4-/5      Sensation  Overall Sensation Status: Impaired (Numbness and tingling in hands and feet )  Bed mobility  Supine to Sit: Stand by assistance  Sit to Supine: Stand by assistance  Scooting: Stand by assistance  Transfers  Sit to Stand: Stand by assistance  Stand to sit: Stand by assistance  Ambulation  Ambulation?: Yes  Ambulation 1  Surface: level tile  Device: Rolling Walker  Other Apparatus: AFO;O2  Assistance: Contact guard assistance  Quality of Gait: Decreased step length and gait speed, mild unsteadiness, no LOB  Distance: 255ft  Stairs/Curb  Stairs?: No     Balance  Posture: Good  Sitting - Static: Good  Sitting - Dynamic: Good  Standing - Static: Good;-  Standing - Dynamic: Good;-        Assessment   Body structures, Functions, Activity limitations: Decreased functional mobility ; Decreased strength;Decreased ROM; Decreased endurance;Decreased balance;Decreased ADL status  Assessment: Pt required SBA for bed mobility and CGA for transfers and ambulation. Pending pt ability to meet PT goals, he can return home with help PRN and OP physical therapy. Prognosis: Good  Decision Making: Medium Complexity  Patient Education: Pt was educated on the importance of mobility and continuing OP PT once discharged from [facility]   Barriers to Learning: None   REQUIRES PT FOLLOW UP: Yes  Activity Tolerance  Activity Tolerance: Patient limited by fatigue;Patient Tolerated treatment well;Patient limited by endurance         Plan   Plan  Times per week: 5  Times per day: Daily  Current Treatment Recommendations: Strengthening, Transfer Training, Endurance Training, Balance Training, IADL Training, Gait Training, Functional Mobility Training, Stair training, ROM, ADL/Self-care Training, Home Exercise Program, Patient/Caregiver Education & Training  Safety Devices  Type of devices: All fall risk precautions in place, Bed alarm in place, Call light within reach, Left in bed, Gait belt, Patient at risk for falls, Nurse notified  Restraints  Initially in place: No    G-Code  PT G-Codes  Functional Assessment Tool Used: [facility]  Score: 16  Functional Limitation: Mobility: Walking and moving around  Mobility: Walking and Moving Around Current Status ():  At least 40 percent but less than 60 percent impaired, limited or restricted  Mobility: Walking and Moving Around Goal Status (): At least 20 percent but less than 40 percent impaired, limited or restricted    AM-PAC Score  AM-PAC Inpatient Mobility Raw Score : 16  AM-PAC Inpatient T-Scale Score : 40.78  Mobility Inpatient CMS 0-100% Score: 54.16  Mobility Inpatient CMS G-Code Modifier : CK          Goals  Short term goals  Time Frame for Short term goals: 14 visits  Short term goal 1: Pt will ambulate independently 450ft with RW  Short term goal 2: Pt will tolerate 30 minutes of activity for endurance  Short term goal 3: Pt will demonstrate good standing balance  Short term goal 4: Pt will ascend/descend 5 stairs with supervision   Short term goal 5: Pt will be independent with bed mobility and transfers   Patient Goals   Patient goals : Pt reports he wants to go home to prior level of function       Therapy Time   Individual Concurrent Group Co-treatment   Time In 0920         Time Out 0948         Minutes 28         Timed Code Treatment Minutes: 606/706 FIDELINA Veliz  Evaluation/treatment performed by Student PT under the supervision of co-signing PT who agrees with all evaluation/treatment and documentation.

## 2018-08-13 NOTE — ED NOTES
Pt presents to ED w/ c/o 6/10 epigastric pain, nausea, and bloody emesis which pt states started last night after eating dinner. Pt states he vomited many times, is unsure of how many times exactly. Pt states emesis was mostly dark colored. Pt was scheduled for an EGD on Friday, although did not make it to appointment. Pt states he also had some SOB, states mostly w/ exertion.  Respirs even/NL     Carina Allen RN  08/12/18 9301

## 2018-08-13 NOTE — H&P
Asthma     Atrial fibrillation ([facility] [address] [facility]     GERD (gastroesophageal reflux disease)     Hyperlipidemia     Hypertension     Obesity     Osteoarthritis     Unspecified sleep apnea        Past Surgical History:        Procedure Laterality Date    CARPAL TUNNEL RELEASE      bilateral    COLONOSCOPY      FINGER AMPUTATION  1966    first knuckle right index finger    GASTRIC BYPASS SURGERY  1985    vertical banded gastroplasty    HEMORRHOID SURGERY      JOINT REPLACEMENT  2004 & 2005    bilateral knees    ID EGD TRANSORAL BIOPSY SINGLE/MULTIPLE N/A 5/25/2018    EGD BIOPSY performed by Lucien Brenner DO at [address] [facility]      left shoulder       Allergies: Allergies   Allergen Reactions    Darvocet [Propoxyphene N-Acetaminophen] Hives    Other Hives    Oxycodone-Acetaminophen          Home Meds:   Prior to Admission medications    Medication Sig Start Date End Date Taking? Authorizing Provider   pantoprazole (PROTONIX) 40 MG tablet Take 1 tablet by mouth daily 6/21/18   Lucien Brenner DO   sucralfate (CARAFATE) 1 GM tablet Take 1 tablet by mouth 4 times daily 6/21/18   Lucien Brenner DO   Incontinence Supply Disposable (INCONTINENCE BRIEF LARGE) MISC To use as directed.  Use 3x a day 6/20/18   Ger Ramos PA-C   polyethylene glycol [facility]) powder Take 17 g by mouth daily 6/20/18   Ger Ramos PA-C   rivaroxaban (XARELTO) 20 MG TABS tablet Take 1 tablet by mouth daily (with breakfast) 6/20/18   Ger Ramos PA-C   bumetanide (BUMEX) 1 MG tablet TAKE 1 TABLET BY MOUTH TWICE A DAY 6/20/18   Ger Ramos PA-C   potassium chloride (KLOR-CON M20) 20 MEQ extended release tablet TAKE 3 TABLETS BY MOUTH TWICE A DAY 6/20/18   Ger Ramos PA-C   gabapentin (NEURONTIN) 300 MG capsule TAKE ONE CAPSULE BY MOUTH 3 TIMES A DAY. 6/20/18 12/17/18  Ger Ramos PA-C   cetirizine (ZYRTEC) 10 MG tablet Take 1 tablet by mouth daily 6/20/18 cough  Cardiovascular ROS: no chest pain or dyspnea on exertion  Gastrointestinal ROS: Positive for abdominal pain, nausea and vomiting  Neurological ROS: no TIA or stroke symptoms  Endocrine ROS: negative  Genito-Urinary ROS: no dysuria, trouble voiding, or hematuria  Allergy and Immunology ROS: negative  Hematological and Lymphatic ROS: negative  Musculoskeletal ROS: negative  Dermatological ROS: negative      Physical Exam:    Vitals: /70   Pulse 74   Temp 99.2 °F (37.3 °C) (Oral)   Resp 14   Wt 239 lb (108.4 kg)   SpO2 95%   BMI 34.29 kg/m²     Last Body weight:   Wt Readings from Last 3 Encounters:   08/12/18 239 lb (108.4 kg)   08/09/18 239 lb (108.4 kg)   07/06/18 238 lb 3.2 oz (108 kg)       Body Mass Index : Body mass index is 34.29 kg/m².         PHYSICAL EXAMINATION :    General appearance - alert, well appearing, and in no distress and oriented to person, place, and time  Mental status - alert, oriented to person, place, and time  Neck - supple, no significant adenopathy, carotids upstroke normal bilaterally, no bruits  Chest - clear to auscultation, no wheezes, rales or rhonchi, symmetric air entry  Heart - normal rate, regular rhythm, normal S1, S2, no murmurs, rubs, clicks or gallops, no JVD  Abdomen - mildly distended and tender  Neurological - alert, oriented, normal speech, no focal findings or movement disorder noted, cranial nerves II-XII grossly intact  Extremities - peripheral pulses normal, no pedal edema, no clubbing or cyanosis,  Skin - normal coloration and turgor, no rashes, no suspicious skin lesions noted     Any additional physical findings:    Laboratory findings:-    CBC:   Recent Labs      08/12/18 2033   WBC  11.0   HGB  12.0*   PLT  210     BMP:    Recent Labs      08/12/18 2033   NA  137   K  3.7   CL  98   CO2  28   BUN  15   CREATININE  0.84   GLUCOSE  109*     S. Calcium:  Recent Labs      08/12/18 2033   CALCIUM  8.8     INR:   Recent Labs      08/12/18 2033 Device    The primary Internal medicine team assigned to the patient will be following up the patient on the floor. The above mentioned assessment and plan will be reviewed by the Internal medicine attending, and can be changed or modified accordingly by the attending physician. Ken Bhatti MD  PGY-1, Department of Internal Medicine  [address] [place] [address]  8/13/2018 12:25 AM      Attending Physician Statement  I have discussed the case, including pertinent history and exam findings with the resident and the team.  I have seen and examined the patient and the key elements of the encounter have been performed by me. I agree with the assessment, plan and orders as documented by the resident.       Marisela Cunha MD, CHARLENE, [address]  Attending Physician, Internal Medicine Service    Internal Medicine Residency Program  8/13/2018, 11:57 AM

## 2018-08-14 ENCOUNTER — APPOINTMENT (OUTPATIENT)
Dept: GENERAL RADIOLOGY | Age: 73
DRG: 377 | End: 2018-08-14
Payer: MEDICARE

## 2018-08-14 LAB
ABSOLUTE EOS #: 0.21 K/UL (ref 0–0.44)
ABSOLUTE IMMATURE GRANULOCYTE: <0.03 K/UL (ref 0–0.3)
ABSOLUTE LYMPH #: 0.82 K/UL (ref 1.1–3.7)
ABSOLUTE MONO #: 0.48 K/UL (ref 0.1–1.2)
ALBUMIN SERPL-MCNC: 3.1 G/DL (ref 3.5–5.2)
ALBUMIN/GLOBULIN RATIO: 1.2 (ref 1–2.5)
ALP BLD-CCNC: 60 U/L (ref 40–129)
ALT SERPL-CCNC: 5 U/L (ref 5–41)
ANION GAP SERPL CALCULATED.3IONS-SCNC: 9 MMOL/L (ref 9–17)
AST SERPL-CCNC: 14 U/L
BASOPHILS # BLD: 0 % (ref 0–2)
BASOPHILS ABSOLUTE: <0.03 K/UL (ref 0–0.2)
BILIRUB SERPL-MCNC: 0.88 MG/DL (ref 0.3–1.2)
BUN BLDV-MCNC: 11 MG/DL (ref 8–23)
BUN/CREAT BLD: ABNORMAL (ref 9–20)
CALCIUM SERPL-MCNC: 8.5 MG/DL (ref 8.6–10.4)
CHLORIDE BLD-SCNC: 104 MMOL/L (ref 98–107)
CO2: 27 MMOL/L (ref 20–31)
CREAT SERPL-MCNC: 0.75 MG/DL (ref 0.7–1.2)
DIFFERENTIAL TYPE: ABNORMAL
EOSINOPHILS RELATIVE PERCENT: 5 % (ref 1–4)
GFR AFRICAN AMERICAN: >60 ML/MIN
GFR NON-AFRICAN AMERICAN: >60 ML/MIN
GFR SERPL CREATININE-BSD FRML MDRD: ABNORMAL ML/MIN/{1.73_M2}
GFR SERPL CREATININE-BSD FRML MDRD: ABNORMAL ML/MIN/{1.73_M2}
GLUCOSE BLD-MCNC: 97 MG/DL (ref 70–99)
HCT VFR BLD CALC: 33.4 % (ref 40.7–50.3)
HEMOGLOBIN: 10.2 G/DL (ref 13–17)
IMMATURE GRANULOCYTES: 0 %
LYMPHOCYTES # BLD: 18 % (ref 24–43)
MCH RBC QN AUTO: 29.2 PG (ref 25.2–33.5)
MCHC RBC AUTO-ENTMCNC: 30.5 G/DL (ref 28.4–34.8)
MCV RBC AUTO: 95.7 FL (ref 82.6–102.9)
MONOCYTES # BLD: 10 % (ref 3–12)
NRBC AUTOMATED: 0 PER 100 WBC
PDW BLD-RTO: 14.6 % (ref 11.8–14.4)
PLATELET # BLD: 147 K/UL (ref 138–453)
PLATELET ESTIMATE: ABNORMAL
PMV BLD AUTO: 9.1 FL (ref 8.1–13.5)
POTASSIUM SERPL-SCNC: 4 MMOL/L (ref 3.7–5.3)
RBC # BLD: 3.49 M/UL (ref 4.21–5.77)
RBC # BLD: ABNORMAL 10*6/UL
SEG NEUTROPHILS: 67 % (ref 36–65)
SEGMENTED NEUTROPHILS ABSOLUTE COUNT: 3.15 K/UL (ref 1.5–8.1)
SODIUM BLD-SCNC: 140 MMOL/L (ref 135–144)
SURGICAL PATHOLOGY REPORT: NORMAL
TOTAL PROTEIN: 5.7 G/DL (ref 6.4–8.3)
WBC # BLD: 4.7 K/UL (ref 3.5–11.3)
WBC # BLD: ABNORMAL 10*3/UL

## 2018-08-14 PROCEDURE — 94660 CPAP INITIATION&MGMT: CPT

## 2018-08-14 PROCEDURE — 97530 THERAPEUTIC ACTIVITIES: CPT

## 2018-08-14 PROCEDURE — 97110 THERAPEUTIC EXERCISES: CPT

## 2018-08-14 PROCEDURE — 6370000000 HC RX 637 (ALT 250 FOR IP): Performed by: STUDENT IN AN ORGANIZED HEALTH CARE EDUCATION/TRAINING PROGRAM

## 2018-08-14 PROCEDURE — 6360000002 HC RX W HCPCS: Performed by: INTERNAL MEDICINE

## 2018-08-14 PROCEDURE — 2580000003 HC RX 258: Performed by: STUDENT IN AN ORGANIZED HEALTH CARE EDUCATION/TRAINING PROGRAM

## 2018-08-14 PROCEDURE — 85025 COMPLETE CBC W/AUTO DIFF WBC: CPT

## 2018-08-14 PROCEDURE — 2580000003 HC RX 258: Performed by: INTERNAL MEDICINE

## 2018-08-14 PROCEDURE — 74220 X-RAY XM ESOPHAGUS 1CNTRST: CPT

## 2018-08-14 PROCEDURE — 1200000000 HC SEMI PRIVATE

## 2018-08-14 PROCEDURE — 74018 RADEX ABDOMEN 1 VIEW: CPT

## 2018-08-14 PROCEDURE — 80053 COMPREHEN METABOLIC PANEL: CPT

## 2018-08-14 PROCEDURE — C9113 INJ PANTOPRAZOLE SODIUM, VIA: HCPCS | Performed by: INTERNAL MEDICINE

## 2018-08-14 PROCEDURE — 36415 COLL VENOUS BLD VENIPUNCTURE: CPT

## 2018-08-14 PROCEDURE — 99233 SBSQ HOSP IP/OBS HIGH 50: CPT | Performed by: INTERNAL MEDICINE

## 2018-08-14 PROCEDURE — 94762 N-INVAS EAR/PLS OXIMTRY CONT: CPT

## 2018-08-14 PROCEDURE — 6360000002 HC RX W HCPCS: Performed by: STUDENT IN AN ORGANIZED HEALTH CARE EDUCATION/TRAINING PROGRAM

## 2018-08-14 PROCEDURE — 94640 AIRWAY INHALATION TREATMENT: CPT

## 2018-08-14 RX ORDER — 0.9 % SODIUM CHLORIDE 0.9 %
500 INTRAVENOUS SOLUTION INTRAVENOUS ONCE
Status: COMPLETED | OUTPATIENT
Start: 2018-08-14 | End: 2018-08-14

## 2018-08-14 RX ORDER — 0.9 % SODIUM CHLORIDE 0.9 %
10 VIAL (ML) INJECTION ONCE
Status: COMPLETED | OUTPATIENT
Start: 2018-08-14 | End: 2018-08-14

## 2018-08-14 RX ORDER — IPRATROPIUM BROMIDE AND ALBUTEROL SULFATE 2.5; .5 MG/3ML; MG/3ML
1 SOLUTION RESPIRATORY (INHALATION) EVERY 6 HOURS
Status: DISCONTINUED | OUTPATIENT
Start: 2018-08-15 | End: 2018-08-15

## 2018-08-14 RX ORDER — ALBUTEROL SULFATE 2.5 MG/3ML
2.5 SOLUTION RESPIRATORY (INHALATION) EVERY 4 HOURS PRN
Status: DISCONTINUED | OUTPATIENT
Start: 2018-08-14 | End: 2018-08-19 | Stop reason: HOSPADM

## 2018-08-14 RX ORDER — PANTOPRAZOLE SODIUM 40 MG/10ML
40 INJECTION, POWDER, LYOPHILIZED, FOR SOLUTION INTRAVENOUS ONCE
Status: COMPLETED | OUTPATIENT
Start: 2018-08-14 | End: 2018-08-14

## 2018-08-14 RX ORDER — ALBUTEROL SULFATE 2.5 MG/3ML
2.5 SOLUTION RESPIRATORY (INHALATION) EVERY 6 HOURS PRN
Status: DISCONTINUED | OUTPATIENT
Start: 2018-08-14 | End: 2018-08-14

## 2018-08-14 RX ADMIN — PROMETHAZINE HYDROCHLORIDE 6.25 MG: 25 INJECTION INTRAMUSCULAR; INTRAVENOUS at 16:38

## 2018-08-14 RX ADMIN — Medication 10 ML: at 23:04

## 2018-08-14 RX ADMIN — PROMETHAZINE HYDROCHLORIDE 6.25 MG: 25 INJECTION INTRAMUSCULAR; INTRAVENOUS at 05:24

## 2018-08-14 RX ADMIN — IPRATROPIUM BROMIDE AND ALBUTEROL SULFATE 3 ML: .5; 3 SOLUTION RESPIRATORY (INHALATION) at 17:05

## 2018-08-14 RX ADMIN — ALBUTEROL SULFATE 2 MG: 2 TABLET ORAL at 21:01

## 2018-08-14 RX ADMIN — IPRATROPIUM BROMIDE AND ALBUTEROL SULFATE 3 ML: .5; 3 SOLUTION RESPIRATORY (INHALATION) at 19:52

## 2018-08-14 RX ADMIN — PROMETHAZINE HYDROCHLORIDE 6.25 MG: 25 INJECTION INTRAMUSCULAR; INTRAVENOUS at 23:04

## 2018-08-14 RX ADMIN — MOMETASONE FUROATE AND FORMOTEROL FUMARATE DIHYDRATE 2 PUFF: 200; 5 AEROSOL RESPIRATORY (INHALATION) at 09:44

## 2018-08-14 RX ADMIN — GABAPENTIN 300 MG: 300 CAPSULE ORAL at 21:01

## 2018-08-14 RX ADMIN — IPRATROPIUM BROMIDE AND ALBUTEROL SULFATE 3 ML: .5; 3 SOLUTION RESPIRATORY (INHALATION) at 09:44

## 2018-08-14 RX ADMIN — SODIUM CHLORIDE 500 ML: 9 INJECTION, SOLUTION INTRAVENOUS at 08:04

## 2018-08-14 RX ADMIN — SODIUM CHLORIDE: 9 INJECTION, SOLUTION INTRAVENOUS at 10:39

## 2018-08-14 RX ADMIN — Medication 10 ML: at 08:57

## 2018-08-14 RX ADMIN — MICONAZOLE NITRATE: 20.6 POWDER TOPICAL at 21:01

## 2018-08-14 RX ADMIN — PANTOPRAZOLE SODIUM 40 MG: 40 INJECTION, POWDER, FOR SOLUTION INTRAVENOUS at 23:04

## 2018-08-14 RX ADMIN — IPRATROPIUM BROMIDE AND ALBUTEROL SULFATE 3 ML: .5; 3 SOLUTION RESPIRATORY (INHALATION) at 13:22

## 2018-08-14 RX ADMIN — MOMETASONE FUROATE AND FORMOTEROL FUMARATE DIHYDRATE 2 PUFF: 200; 5 AEROSOL RESPIRATORY (INHALATION) at 19:52

## 2018-08-14 RX ADMIN — Medication 10 ML: at 21:24

## 2018-08-14 ASSESSMENT — PAIN SCALES - GENERAL: PAINLEVEL_OUTOF10: 0

## 2018-08-14 NOTE — PROGRESS NOTES
Pt continues to have emesis throughout shift. No evidence of blood in emesis. MD aware. Pt esophagram completed, KUB ordered.

## 2018-08-14 NOTE — PROGRESS NOTES
Surgery Progress Note            PATIENT NAME: Logan Yip     TODAY'S DATE: 8/14/2018, 7:04 AM    SUBJECTIVE:    Pt  Seen and examined. S/p EGD with food bolus removal. Did well overnight. Has some nausea and still vomiting when pills are given. Hgb's stable. Hemodynamically stable. OBJECTIVE:   VITALS:  BP (!) 93/55   Pulse 75   Temp 98.5 °F (36.9 °C) (Axillary)   Resp 13   Ht 5' 10\" (1.778 m)   Wt 243 lb 13.3 oz (110.6 kg)   SpO2 100%   BMI 34.99 kg/m²      INTAKE/OUTPUT:      Intake/Output Summary (Last 24 hours) at 08/14/18 0704  Last data filed at 08/14/18 0528   Gross per 24 hour   Intake             2465 ml   Output              800 ml   Net             1665 ml       PHYSICAL EXAM  General Appearance:  awake, alert, oriented, in no acute distress  Lungs:  Normal expansion. Clear to auscultation. No rales, rhonchi, or wheezing. Heart:  Heart regular rate and rhythm  Abdomen:  Soft, non distended, mild tenderness in epigastric region, no peritoneal signs  Musculoskeletal:  No joint swelling, deformity, or tenderness.          Data:  CBC with Differential:    Lab Results   Component Value Date    WBC 4.7 08/14/2018    RBC 3.49 08/14/2018    RBC 4.39 09/13/2011    HGB 10.2 08/14/2018    HCT 33.4 08/14/2018     08/14/2018     09/13/2011    MCV 95.7 08/14/2018    MCH 29.2 08/14/2018    MCHC 30.5 08/14/2018    RDW 14.6 08/14/2018    LYMPHOPCT 18 08/14/2018    LYMPHOPCT 21.2 10/30/2013    MONOPCT 10 08/14/2018    MONOPCT 7.9 10/30/2013    EOSPCT 7.9 10/30/2013    BASOPCT 0 08/14/2018    BASOPCT 0.7 10/30/2013    MONOSABS 0.48 08/14/2018    MONOSABS 0.5 10/30/2013    LYMPHSABS 0.82 08/14/2018    LYMPHSABS 1.4 10/30/2013    EOSABS 0.21 08/14/2018    EOSABS 0.5 10/30/2013    BASOSABS <0.03 08/14/2018    DIFFTYPE NOT REPORTED 08/14/2018     BMP:    Lab Results   Component Value Date     08/13/2018    K 3.5 08/13/2018     08/13/2018    CO2 29 08/13/2018    BUN 14 08/13/2018

## 2018-08-14 NOTE — PROGRESS NOTES
Pt update. Pt actively vomiting and not able to tolerate PO intake at the moment. Plan to discharge once patient able to tolerate the diet, likely not today.     Zhen Bass MD  PGY-2, Internal Medicine  [facility]

## 2018-08-14 NOTE — PROGRESS NOTES
[facility]  Internal Medicine Residency Program  Inpatient Daily Progress Note  ______________________________________________________________________________    Patient: Mal Orozco  YOB: 1945   MRN: [id number]    Acct: [de-identified]     Admit date: 8/12/2018  Today's date: 08/14/18  Number of days in the hospital: 1  Expected Discharge Date: 08/16/18    Admitting Diagnosis: Acute gastric ulcer with bleeding    Subjective:   Pt seen and Chart reviewed. Remains hemodynamically stable. Continues to have N/V. Currently Hgb stable. Denies any hematemesis or coffee ground emesis. Emesis mostly contains stomach content. Complains of diffuse abdominal pain. Currently on reglan, PPI and carafe. Objective:   Vital Sign:  /73   Pulse 67   Temp 98 °F (36.7 °C) (Oral)   Resp 14   Ht 5' 10\" (1.778 m)   Wt 243 lb 13.3 oz (110.6 kg)   SpO2 100%   BMI 34.99 kg/m²       Physical Exam:  General appearance:   alert, well appearing, and in no distress  Lungs:  clear to auscultation, no wheezes, rales or rhonchi, symmetric air entry  Heart[de-identified] normal rate and regular rhythm, S1 and S2 normal  Abdomen:  Diffuse mild tenderness to palpation. No guarding or rebound. Bowel sounds present.    Extremities: no pedal edema noted   Skin: normal coloration and turgor, no rashes, no suspicious skin lesions noted    Medications:  Scheduled Medications   sodium chloride  500 mL Intravenous Once    rivaroxaban  20 mg Oral Daily with breakfast    sodium chloride flush  10 mL Intravenous 2 times per day    albuterol  2 mg Oral BID    cetirizine  10 mg Oral Daily    ciclopirox   Topical Nightly    gabapentin  300 mg Oral BID    ipratropium-albuterol  1 vial Inhalation Q4H    multivitamin  1 tablet Oral Daily    polyethylene glycol  17 g Oral Daily    sucralfate  1 g Oral 4x Daily    sodium chloride flush  10 mL Intravenous 2 times per day    Armodafinil  1 Dorothy Kemp MD    Department of Internal Medicine  [facility]         8/14/2018, 9:35 AM      Attending Physician Statement  I have discussed the case, including pertinent history and exam findings with the resident and the team.  I have seen and examined the patient and the key elements of the encounter have been performed by me. I agree with the assessment, plan and orders as documented by the resident. EGD completed yesterday and was noted for bolus impaction which was removed. She tolerated oral intake with mild nausea. He is ambulating with physical therapy. Advised soft diet. No solid diet. Patient can be discharged today after swallow study is completed.          Orlandou Glenda Braga MD, CHARLENE, [address]  Attending Physician, Internal Medicine Service    Internal Medicine Residency Program  8/14/2018, 12:07 PM

## 2018-08-14 NOTE — PROGRESS NOTES
Patient with dilation of gastric pouch on upper GI. He had an episode of emesis after trial of diet. Will make NPO. Will ask Dr. Jak James for consideration of dilation of the outflow region of the gastric pouch. There is clearly food still stuck in the pouch distally on upper GI today.

## 2018-08-14 NOTE — PROGRESS NOTES
Physical Therapy  Facility/Department: [address] STEPDOWN  Daily Treatment Note  NAME: Constance Andersen  : 1945  MRN: [id number]    Date of Service: 2018    Discharge Recommendations:  Outpatient PT, Home with assist PRN   PT Equipment Recommendations  Equipment Needed: No    Patient Diagnosis(es): The encounter diagnosis was Hematemesis with nausea. has a past medical history of Asthma; Atrial fibrillation ([facility] [address] GERD (gastroesophageal reflux disease); Hyperlipidemia; Hypertension; Obesity; Osteoarthritis; and Unspecified sleep apnea. has a past surgical history that includes Finger amputation (); Gastric bypass surgery (); Carpal tunnel release; Colonoscopy; Rotator cuff repair; joint replacement ( & ); Hemorrhoid surgery; pr egd transoral biopsy single/multiple (N/A, 2018); Upper gastrointestinal endoscopy (2018); Upper gastrointestinal endoscopy (N/A, 2018); and Upper gastrointestinal endoscopy (N/A, 2018). Restrictions  Restrictions/Precautions  Restrictions/Precautions: Fall Risk  Required Braces or Orthoses?: Yes (Pt has an ankle foot orthotic in his shoe that he needs for ambulation)  Position Activity Restriction  Other position/activity restrictions: Up as tolerated  Subjective   General  Response To Previous Treatment: Patient with no complaints from previous session.   Family / Caregiver Present: No  Subjective  Subjective: RN and pt agreed to PT, pt awake in bed upon arrival on room air, per RN pt has been vomiting frequently  Pain Screening  Patient Currently in Pain: Denies  Vital Signs  Patient Currently in Pain: Denies       Orientation  Orientation  Overall Orientation Status: Within Normal Limits  Objective   Bed mobility  Supine to Sit: Stand by assistance  Scooting: Stand by assistance  Transfers  Sit to Stand: Stand by assistance  Stand to sit: Stand by assistance  Ambulation  Ambulation?: Yes  Ambulation 1  Surface: level tile  Device: Rolling Walker  Other Apparatus: AFO  Assistance: Contact guard assistance  Quality of Gait: Decreased karl, heavy reliance on UE's,  no LOB  Distance: 200ft  Comments: pt ambulated on room air, no c/o SOB, O2 WFL  Stairs/Curb  Stairs?: No     Balance  Posture: Good  Sitting - Static: Good  Sitting - Dynamic: Good  Standing - Static: Fair  Standing - Dynamic: -;Fair  Comments: Pt sat EOB approx 10' SBA while donning socks and shoes, pt able to bend over and tie shoes w/no LOB  Other exercises  Other exercises?: Yes  Seated LE exercise program: Long Arc Quads, hip abduction/adduction, heel/toe raises, and marches. Reps: x 10  Upper extremity exercises: Bicep curl, shoulder flexion/extension, punches, tricep curl, shoulder abduction/adduction. Reps: x10      Assessment   Body structures, Functions, Activity limitations: Decreased functional mobility ; Decreased strength;Decreased ROM; Decreased endurance;Decreased balance;Decreased ADL status  Assessment: Pt able to amb 200ft w/RW CGA, heavy reliance on UE's which he reports is baseline, Pt expected to be appropriate to return home with help PRN and OP PT  Prognosis: Good  REQUIRES PT FOLLOW UP: Yes  Activity Tolerance  Activity Tolerance: Patient Tolerated treatment well;Patient limited by fatigue     G-Code     OutComes Score                                                    AM-PAC Score             Goals  Short term goals  Time Frame for Short term goals: 14 visits  Short term goal 1: Pt will ambulate independently 450ft with RW  Short term goal 2: Pt will tolerate 30 minutes of activity for endurance  Short term goal 3: Pt will demonstrate good standing balance  Short term goal 4: Pt will ascend/descend 5 stairs with supervision   Short term goal 5: Pt will be independent with bed mobility and transfers   Patient Goals   Patient goals : Pt reports he wants to go home to prior level of function    Plan    Plan  Times per week: 5  Times per day: Daily  Current

## 2018-08-15 ENCOUNTER — APPOINTMENT (OUTPATIENT)
Dept: GENERAL RADIOLOGY | Age: 73
DRG: 377 | End: 2018-08-15
Payer: MEDICARE

## 2018-08-15 LAB
ABSOLUTE EOS #: 0.17 K/UL (ref 0–0.44)
ABSOLUTE IMMATURE GRANULOCYTE: <0.03 K/UL (ref 0–0.3)
ABSOLUTE LYMPH #: 0.72 K/UL (ref 1.1–3.7)
ABSOLUTE MONO #: 0.6 K/UL (ref 0.1–1.2)
ALBUMIN SERPL-MCNC: 3.1 G/DL (ref 3.5–5.2)
ALBUMIN/GLOBULIN RATIO: 1 (ref 1–2.5)
ALP BLD-CCNC: 67 U/L (ref 40–129)
ALT SERPL-CCNC: 7 U/L (ref 5–41)
ANION GAP SERPL CALCULATED.3IONS-SCNC: 11 MMOL/L (ref 9–17)
AST SERPL-CCNC: 27 U/L
BASOPHILS # BLD: 1 % (ref 0–2)
BASOPHILS ABSOLUTE: 0.03 K/UL (ref 0–0.2)
BILIRUB SERPL-MCNC: 1.28 MG/DL (ref 0.3–1.2)
BUN BLDV-MCNC: 9 MG/DL (ref 8–23)
BUN/CREAT BLD: ABNORMAL (ref 9–20)
CALCIUM SERPL-MCNC: 8.7 MG/DL (ref 8.6–10.4)
CHLORIDE BLD-SCNC: 103 MMOL/L (ref 98–107)
CO2: 25 MMOL/L (ref 20–31)
CREAT SERPL-MCNC: 0.51 MG/DL (ref 0.7–1.2)
DIFFERENTIAL TYPE: ABNORMAL
EOSINOPHILS RELATIVE PERCENT: 3 % (ref 1–4)
GFR AFRICAN AMERICAN: >60 ML/MIN
GFR NON-AFRICAN AMERICAN: >60 ML/MIN
GFR SERPL CREATININE-BSD FRML MDRD: ABNORMAL ML/MIN/{1.73_M2}
GFR SERPL CREATININE-BSD FRML MDRD: ABNORMAL ML/MIN/{1.73_M2}
GLUCOSE BLD-MCNC: 104 MG/DL (ref 70–99)
HCT VFR BLD CALC: 33.7 % (ref 40.7–50.3)
HEMOGLOBIN: 10.5 G/DL (ref 13–17)
IMMATURE GRANULOCYTES: 0 %
LYMPHOCYTES # BLD: 12 % (ref 24–43)
MCH RBC QN AUTO: 29.7 PG (ref 25.2–33.5)
MCHC RBC AUTO-ENTMCNC: 31.2 G/DL (ref 28.4–34.8)
MCV RBC AUTO: 95.5 FL (ref 82.6–102.9)
MONOCYTES # BLD: 10 % (ref 3–12)
NRBC AUTOMATED: 0 PER 100 WBC
PDW BLD-RTO: 14.7 % (ref 11.8–14.4)
PLATELET # BLD: 185 K/UL (ref 138–453)
PLATELET ESTIMATE: ABNORMAL
PMV BLD AUTO: 10.2 FL (ref 8.1–13.5)
POTASSIUM SERPL-SCNC: 4.8 MMOL/L (ref 3.7–5.3)
RBC # BLD: 3.53 M/UL (ref 4.21–5.77)
RBC # BLD: ABNORMAL 10*6/UL
SEG NEUTROPHILS: 74 % (ref 36–65)
SEGMENTED NEUTROPHILS ABSOLUTE COUNT: 4.38 K/UL (ref 1.5–8.1)
SODIUM BLD-SCNC: 139 MMOL/L (ref 135–144)
TOTAL PROTEIN: 6.2 G/DL (ref 6.4–8.3)
WBC # BLD: 5.9 K/UL (ref 3.5–11.3)
WBC # BLD: ABNORMAL 10*3/UL

## 2018-08-15 PROCEDURE — 97116 GAIT TRAINING THERAPY: CPT

## 2018-08-15 PROCEDURE — 2580000003 HC RX 258: Performed by: INTERNAL MEDICINE

## 2018-08-15 PROCEDURE — 36415 COLL VENOUS BLD VENIPUNCTURE: CPT

## 2018-08-15 PROCEDURE — 6360000002 HC RX W HCPCS: Performed by: STUDENT IN AN ORGANIZED HEALTH CARE EDUCATION/TRAINING PROGRAM

## 2018-08-15 PROCEDURE — 94762 N-INVAS EAR/PLS OXIMTRY CONT: CPT

## 2018-08-15 PROCEDURE — 80053 COMPREHEN METABOLIC PANEL: CPT

## 2018-08-15 PROCEDURE — 2580000003 HC RX 258: Performed by: STUDENT IN AN ORGANIZED HEALTH CARE EDUCATION/TRAINING PROGRAM

## 2018-08-15 PROCEDURE — 1200000000 HC SEMI PRIVATE

## 2018-08-15 PROCEDURE — G8987 SELF CARE CURRENT STATUS: HCPCS

## 2018-08-15 PROCEDURE — 97110 THERAPEUTIC EXERCISES: CPT

## 2018-08-15 PROCEDURE — 97166 OT EVAL MOD COMPLEX 45 MIN: CPT

## 2018-08-15 PROCEDURE — 85025 COMPLETE CBC W/AUTO DIFF WBC: CPT

## 2018-08-15 PROCEDURE — 99233 SBSQ HOSP IP/OBS HIGH 50: CPT | Performed by: INTERNAL MEDICINE

## 2018-08-15 PROCEDURE — 74018 RADEX ABDOMEN 1 VIEW: CPT

## 2018-08-15 PROCEDURE — G8988 SELF CARE GOAL STATUS: HCPCS

## 2018-08-15 PROCEDURE — 6370000000 HC RX 637 (ALT 250 FOR IP): Performed by: STUDENT IN AN ORGANIZED HEALTH CARE EDUCATION/TRAINING PROGRAM

## 2018-08-15 PROCEDURE — 94640 AIRWAY INHALATION TREATMENT: CPT

## 2018-08-15 PROCEDURE — 97535 SELF CARE MNGMENT TRAINING: CPT

## 2018-08-15 RX ORDER — IPRATROPIUM BROMIDE AND ALBUTEROL SULFATE 2.5; .5 MG/3ML; MG/3ML
1 SOLUTION RESPIRATORY (INHALATION) 4 TIMES DAILY
Status: DISCONTINUED | OUTPATIENT
Start: 2018-08-15 | End: 2018-08-19 | Stop reason: HOSPADM

## 2018-08-15 RX ADMIN — SODIUM CHLORIDE: 9 INJECTION, SOLUTION INTRAVENOUS at 11:09

## 2018-08-15 RX ADMIN — ALBUTEROL SULFATE 2 MG: 2 TABLET ORAL at 20:37

## 2018-08-15 RX ADMIN — IPRATROPIUM BROMIDE AND ALBUTEROL SULFATE 3 ML: .5; 3 SOLUTION RESPIRATORY (INHALATION) at 21:03

## 2018-08-15 RX ADMIN — IPRATROPIUM BROMIDE AND ALBUTEROL SULFATE 3 ML: .5; 3 SOLUTION RESPIRATORY (INHALATION) at 08:45

## 2018-08-15 RX ADMIN — GABAPENTIN 300 MG: 300 CAPSULE ORAL at 20:38

## 2018-08-15 RX ADMIN — Medication 10 ML: at 10:42

## 2018-08-15 RX ADMIN — MOMETASONE FUROATE AND FORMOTEROL FUMARATE DIHYDRATE 2 PUFF: 200; 5 AEROSOL RESPIRATORY (INHALATION) at 21:03

## 2018-08-15 RX ADMIN — GABAPENTIN 300 MG: 300 CAPSULE ORAL at 17:45

## 2018-08-15 RX ADMIN — PANTOPRAZOLE SODIUM 40 MG: 40 TABLET, DELAYED RELEASE ORAL at 17:58

## 2018-08-15 RX ADMIN — MICONAZOLE NITRATE: 20.6 POWDER TOPICAL at 20:38

## 2018-08-15 RX ADMIN — SUCRALFATE 1 G: 1 TABLET ORAL at 17:42

## 2018-08-15 RX ADMIN — CETIRIZINE HYDROCHLORIDE 10 MG: 10 TABLET ORAL at 17:55

## 2018-08-15 RX ADMIN — MICONAZOLE NITRATE: 20.6 POWDER TOPICAL at 10:42

## 2018-08-15 RX ADMIN — SUCRALFATE 1 G: 1 TABLET ORAL at 20:38

## 2018-08-15 RX ADMIN — IPRATROPIUM BROMIDE AND ALBUTEROL SULFATE 3 ML: .5; 3 SOLUTION RESPIRATORY (INHALATION) at 14:55

## 2018-08-15 RX ADMIN — MOMETASONE FUROATE AND FORMOTEROL FUMARATE DIHYDRATE 2 PUFF: 200; 5 AEROSOL RESPIRATORY (INHALATION) at 08:46

## 2018-08-15 RX ADMIN — PROMETHAZINE HYDROCHLORIDE 6.25 MG: 25 INJECTION INTRAMUSCULAR; INTRAVENOUS at 14:59

## 2018-08-15 RX ADMIN — THERA TABS 1 TABLET: TAB at 17:44

## 2018-08-15 RX ADMIN — ALBUTEROL SULFATE 2 MG: 2 TABLET ORAL at 17:57

## 2018-08-15 ASSESSMENT — PAIN SCALES - GENERAL
PAINLEVEL_OUTOF10: 8
PAINLEVEL_OUTOF10: 6

## 2018-08-15 ASSESSMENT — PAIN DESCRIPTION - LOCATION
LOCATION: ABDOMEN
LOCATION: ABDOMEN;RIB CAGE

## 2018-08-15 ASSESSMENT — PAIN DESCRIPTION - ORIENTATION
ORIENTATION: UPPER;MID
ORIENTATION: MID

## 2018-08-15 ASSESSMENT — PAIN DESCRIPTION - FREQUENCY: FREQUENCY: INTERMITTENT

## 2018-08-15 ASSESSMENT — PAIN DESCRIPTION - ONSET: ONSET: ON-GOING

## 2018-08-15 ASSESSMENT — PAIN DESCRIPTION - PAIN TYPE
TYPE: ACUTE PAIN
TYPE: ACUTE PAIN

## 2018-08-15 ASSESSMENT — PAIN DESCRIPTION - DESCRIPTORS
DESCRIPTORS: DISCOMFORT
DESCRIPTORS: DISCOMFORT

## 2018-08-15 ASSESSMENT — PAIN DESCRIPTION - PROGRESSION
CLINICAL_PROGRESSION: GRADUALLY IMPROVING
CLINICAL_PROGRESSION: GRADUALLY IMPROVING

## 2018-08-15 NOTE — PROGRESS NOTES
Assessment  Score 1 2 3   CXR and Breath Sounds   []  Clear []  No atelectasis  Basilar aeration []  Atelectasis or absent basilar breath sounds   Incentive Spirometry Volume  (Per IBW)   []  Greater than or equal to 15ml/Kg []  less than 15ml/Kg []  less than 15ml/Kg   Surgery within last 2 weeks []  None or general   []  Abdominal or thoracic surgery  []  Abdominal or thoracic   Chronic Pulmonary Historyre []  No []  Yes []  Yes     []  Secretion Management Assessment  Score 1 2 3   Bilateral Breath Sounds   []  Occasional Rhonchi []  Scattered Rhonchi []  Course Rhonchi and/or poor aeration   Sputum    []  Small amount of thin secretions []  Moderate amount of viscous secretions []  Copius, Viscious Yellow/ Secretions   CXR as reported by physician []  clear  []  Unavailable []  Infiltrates and/or consolidation  []  Unavailable []  Mucus Plugging and or lobar consolidation  []  Unavailable   Cough []  Strong, productive cough []  Weak productive cough []  No cough or weak non-productive cough   ERIKA HAYS  3:55 PM                            FEMALE                                  MALE                            FEV1 Predicted Normal Values                        FEV1 Predicted Normal Values          Age                                     Height in Feet and Inches       Age                                     Height in Feet and Inches       4' 11\" 5' 1\" 5' 3\" 5' 5\" 5' 7\" 5' 9\" 5' 11\" 6' 1\"  4' 11\" 5' 1\" 5' 3\" 5' 5\" 5' 7\" 5' 9\" 5' 11\" 6' 1\"   42 - 45 2.49 2.66 2.84 3.03 3.22 3.42 3.62 3.83 42 - 45 2.82 3.03 3.26 3.49 3.72 3.96 4.22 4.47   46 - 49 2.40 2.57 2.76 2.94 3.14 3.33 3.54 3.75 46 - 49 2.70 2.92 3.14 3.37 3.61 3.85 4.10 4.36   50 - 53 2.31 2.48 2.66 2.85 3.04 3.24 3.45 3.66 50 - 53 2.58 2.80 3.02 3.25 3.49 3.73 3.98 4.24   54 - 57 2.21 2.38 2.57 2.75 2.95 3.14 3.35 3.56 54 - 57 2.46 2.67 2.89 3.12 3.36 3.60 3.85 4.11   58 - 61 2.10 2.28 2.46 2.65 2.84 3.04 3.24 3.45 58 - 61 2.32 2.54 2.76 2.99 3.23

## 2018-08-15 NOTE — CONSULTS
THE [facility] AT [facility]  Consultation Note     . REASON FOR CONSULTATION:    Outflow obstruction of the Gastric pouch requiring dilatation of the outflow tract    HISTORY OF PRESENT ILLNESS:     This is a 71 y.o. male who presents with c/o hematemesis mild, diffuse abdominal pain and low grade fever that was associate with a dry cough for 2 days prior to admission. Pt stated that the vomiting initially was clear that then turned black due to the presence of blood. The pt had been off his Xarelto for the last 2 weeks. Pt states that he has not drank or smoked in over 40 years. He denies any heavy NSAID use. Upon admission, Dr. Chino Tejada (gastric bypass surgeon) was consulted and performed an EGD indicating a large food bolus that needs removal and the presence of Gastritis and Hernandez's Esophagitits. GI was consulted for the need to have the pts gastric pouch dilatated in the outflow tract.    Pt takes Xarelto (h/o Afib)  EGD Per Dr. Chino Tejada 8/13/2018.   8/13/2018  Preoperative Diagnosis:    GERD  Status post vertical banded gastroplasty  Possible gastric Ulcer  Postoperative Diagnosis:   GERD with evidence of Hernandez's esophagus  Status post Bariatric Surgery - Vertical Banded Gastroplasty  Gastritis with impacted food bolus in gastroplasty pouch  Antral Gastritis  PAST MEDICAL HISTORY:  Past Medical History:   Diagnosis Date    Asthma     Atrial fibrillation ([address]     GERD (gastroesophageal reflux disease)     Hyperlipidemia     Hypertension     Obesity     Osteoarthritis     Unspecified sleep apnea      Past Surgical History:   Procedure Laterality Date    CARPAL TUNNEL RELEASE      bilateral    COLONOSCOPY      FINGER AMPUTATION  1966    first knuckle right index finger    GASTRIC BYPASS SURGERY  1985    vertical banded gastroplasty    HEMORRHOID SURGERY      JOINT REPLACEMENT  2004 & 2005    bilateral knees    [place] EGD TRANSORAL BIOPSY SINGLE/MULTIPLE N/A 5/25/2018    EGD BIOPSY performed by Mady Ormond, DO at [facility]    ROTATOR CUFF REPAIR      left shoulder    UPPER GASTROINTESTINAL ENDOSCOPY  08/13/2018    removal of food bolus    UPPER GASTROINTESTINAL ENDOSCOPY N/A 8/13/2018    EGD FOREIGN BODY REMOVAL performed by Mady Ormond, DO at [address] N/A 8/13/2018    EGD BIOPSY performed by Mady Ormond, DO at [address]:  Allergies   Allergen Reactions    Darvocet [Propoxyphene N-Acetaminophen] Hives    Other Hives    Oxycodone-Acetaminophen        HOME MEDICATIONS:  Prior to Admission medications    Medication Sig Start Date End Date Taking? Authorizing Provider   pantoprazole (PROTONIX) 40 MG tablet Take 1 tablet by mouth daily 6/21/18   Mady Ormond, DO   sucralfate (CARAFATE) 1 GM tablet Take 1 tablet by mouth 4 times daily 6/21/18   Mady Ormond, DO   Incontinence Supply Disposable (INCONTINENCE BRIEF LARGE) MISC To use as directed.  Use 3x a day 6/20/18   Kev Sweeney PA-C   polyethylene glycol [facility]) powder Take 17 g by mouth daily 6/20/18   Kev Sweeney PA-C   rivaroxaban (XARELTO) 20 MG TABS tablet Take 1 tablet by mouth daily (with breakfast) 6/20/18   Kev Sweeney PA-C   bumetanide (BUMEX) 1 MG tablet TAKE 1 TABLET BY MOUTH TWICE A DAY 6/20/18   Kev Sweeney PA-C   potassium chloride (KLOR-CON M20) 20 MEQ extended release tablet TAKE 3 TABLETS BY MOUTH TWICE A DAY 6/20/18   Kev Sweeney PA-C   gabapentin (NEURONTIN) 300 MG capsule TAKE ONE CAPSULE BY MOUTH 3 TIMES A DAY. 6/20/18 12/17/18  Kev Sweeney PA-C   cetirizine (ZYRTEC) 10 MG tablet Take 1 tablet by mouth daily 6/20/18   Kev Sweeney PA-C   cyclobenzaprine (FLEXERIL) 10 MG tablet Take 1 tablet by mouth nightly as needed for Muscle spasms 6/20/18   Kev Sweeney PA-C   spironolactone (ALDACTONE) 25 MG tablet Take 1 tablet by mouth daily 6/20/18   Kev Sweeney PA-C be narrowed.       The gastroesophageal junction is normal.  Gastroesophageal reflux is seen to   the upper thoracic esophagus. Ladarius Bulmaro is relatively decreased density of   contrast in the body of the stomach as compared to the gastric fundus.  There   appears to be further decreased density in the distal stomach/pylorus as   compared to the body of the stomach.  No contrast is clearly identified in   the duodenum throughout the exam.           Impression   1. Findings compatible with presbyesophagus.  No clear esophageal narrowing   or narrowing of the vertical gastroplasty channel. 2. Lack of contrast passing into the duodenum throughout the exam and   relatively decreased density in the distal stomach/pylorus as compared to the   body of the stomach and increased density of the body of the stomach as   compared to the gastric fundus.  Findings may relate to gastric outlet   obstruction with enteric content/retained food in the distal stomach and   pylorus.  Progress radiographs could be utilized for further evaluation, as   indicated. 8/14/2018 KUB:  FINDINGS:   2 images obtained.       Oral contrast is predominantly located within the stomach with free reflux   into the lower esophagus. Ladarius Bulmaro has been no significant transit of contrast   into the small bowel.  A few gaseous distended loops of bowel in the right   mid to lower abdomen are present.           Impression   Minimal progression of contrast with majority of the contrast within the   stomach and with evidence of reflux into the esophagus.       RECOMMENDATION:   Continued progress radiographs        Impression:  1. Abnormal EGD indicating distal stomach obstruction most likely secondary to esophogeal dysmotility as seen on esophagram. EGD with food bolus removal per Dr. Devon Hoffman. Pt still needs his pylorus obstruction explored and possibly dilated. Plan:  1. Will plan for OR tomorrow for CRE with balloon dilatation with fluro approx 1230  2. NPO MN  3. Hold Xarelto/Lovenox tonight and tomorow  4. POC to follow results of procedure. This plan was formulated in collaboration with Dr. Jose Antonio Montalvo.      Electronically signed by:  Daylin Fraser CNP, THE [facility] AT [facility]  [phone]  8/15/2018    7:32 AM

## 2018-08-15 NOTE — CARE COORDINATION
Plan for further GI procedure tomorrow.  Plan is to return to home at discharge and continue with outpatient PT.

## 2018-08-15 NOTE — PROGRESS NOTES
[address] [place] [address]     Department of Internal Medicine - Staff Internal Medicine Service     DAILY PROGRESS NOTE       Patient:  Mal Orozco  YOB: 1945  MRN: [id number]     Acct: [de-identified]     Admit date: 8/12/2018  Admitting Diagnosis: Acute gastric ulcer with bleeding    Subjective:   Patient seen and examined at bedside. Alert and oriented and vitally stable. No acute events overnight. No complaints of nausea or vomiting at the moment. Patient on High flow O2 via nasal canula as he was unable to tolerate CPAP.   Complaints of mild pain in the abdomen    Objective:   /63   Pulse 77   Temp 98.2 °F (36.8 °C) (Oral)   Resp 19   Ht 5' 10\" (1.778 m)   Wt 249 lb 5.4 oz (113.1 kg)   SpO2 100%   BMI 35.78 kg/m²       General appearance - alert, well appearing, and in no distress  Chest - clear to auscultation, no wheezes, rales or rhonchi, symmetric air entry  Heart - normal rate, regular rhythm, normal S1, S2, no murmurs, rubs, clicks or gallops  Abdomen - soft, nontender, nondistended, no masses or organomegaly  Neurological - alert, oriented, normal speech, no focal findings or movement disorder noted  Musculoskeletal - no joint tenderness, deformity or swelling  Extremities - peripheral pulses normal, no pedal edema, no clubbing or cyanosis  Skin - normal coloration and turgor, no rashes, no suspicious skin lesions noted    Intake/Output Summary (Last 24 hours) at 08/15/18 0653  Last data filed at 08/15/18 0520   Gross per 24 hour   Intake             2308 ml   Output             1775 ml   Net              533 ml       Medications:      rivaroxaban  20 mg Oral Daily with breakfast    ipratropium-albuterol  1 vial Inhalation Q6H    sodium chloride flush  10 mL Intravenous 2 times per day    albuterol  2 mg Oral BID    cetirizine  10 mg Oral Daily    ciclopirox   Topical Nightly    gabapentin  300 mg Oral BID    multivitamin  1 tablet Oral Daily    polyethylene patient restarted on Xeralto yesterday  4. Acute Blood Loss Anemia- Resolved. Hb Stable at 10.5  5. COPD- Patient on his home inhalers dulera and duoneb, On High flow O2 via nasal canula 3L  6. Obstructive sleep apnea-  Continue CPAP at night. 7. DVT Prophylaxis- EPC    Cathleen Longoria MD  PGY-1, Department of Internal Medicine  [address] [place] [address], [address]  8/15/2018 7:08 AM      Attending Physician Statement  I have discussed the case, including pertinent history and exam findings with the resident and the team.  I have seen and examined the patient and the key elements of the encounter have been performed by me. I agree with the assessment, plan and orders as documented by the resident. Patient has improved but still has occasional nausea and vomiting with oral intake.   We'll obtain GI input for possible esophageal dilatation    Marisela Lezama MD, CHARLENE, [address]  Attending Physician, Internal Medicine Service    Internal Medicine Residency Program  8/15/2018, 1:04 PM

## 2018-08-15 NOTE — PROGRESS NOTES
been happening for about 4 months. Pt did not demo any LOB during session. RN notified about pts concerns with ear. Toilet Transfers  Toilet - Technique: Ambulating  Equipment Used: Grab bars  Toilet Transfer: Contact guard assistance      ADL  Feeding: Setup; Increased time to complete; Independent  Grooming: Setup; Increased time to complete;Stand by assistance  UE Bathing: Setup; Increased time to complete;Stand by assistance  LE Bathing: Setup; Increased time to complete;Stand by assistance  UE Dressing: Setup; Increased time to complete;Stand by assistance  LE Dressing: Setup; Increased time to complete;Stand by assistance  Toileting: Setup; Increased time to complete;Stand by assistance      Tone RUE  RUE Tone: Normotonic  Tone LUE  LUE Tone: Normotonic  Coordination  Movements Are Fluid And Coordinated: No  Coordination and Movement description: Decreased speed     Bed mobility  Sit to Supine: Stand by assistance  Scooting: Stand by assistance  Comment: pt sat in chair at entrance, and was in bed upon exit      Transfers  Sit to stand: Contact guard assistance  Stand to sit: Contact guard assistance     Cognition  Overall Cognitive Status: WFL       LUE AROM : WFL  RUE AROM : WFL  LUE Strength  Gross LUE Strength: WFL  L Hand Grasp: 5/5  L Hand Release: 5/5  RUE Strength  Gross RUE Strength: WFL  R Hand Grasp: 5/5  R Hand Release: 5/5     Assessment   Performance deficits / Impairments: Decreased ADL status; Decreased high-level IADLs;Decreased functional mobility ; Decreased balance;Decreased vision/visual deficit  Prognosis: Good  Decision Making: Medium Complexity  Patient Education: OT POC, discharge recs, safety awareness, funct mob/funct transfer techs, night light use in home, good return. REQUIRES OT FOLLOW UP: Yes  Activity Tolerance  Activity Tolerance: Patient Tolerated treatment well  Safety Devices  Safety Devices in place: Yes  Type of devices: Patient at risk for falls; All fall risk precautions in

## 2018-08-16 ENCOUNTER — ANESTHESIA EVENT (OUTPATIENT)
Dept: OPERATING ROOM | Age: 73
DRG: 377 | End: 2018-08-16
Payer: MEDICARE

## 2018-08-16 ENCOUNTER — APPOINTMENT (OUTPATIENT)
Dept: PHYSICAL THERAPY | Facility: CLINIC | Age: 73
End: 2018-08-16
Payer: MEDICARE

## 2018-08-16 ENCOUNTER — APPOINTMENT (OUTPATIENT)
Dept: GENERAL RADIOLOGY | Age: 73
DRG: 377 | End: 2018-08-16
Payer: MEDICARE

## 2018-08-16 ENCOUNTER — ANESTHESIA (OUTPATIENT)
Dept: OPERATING ROOM | Age: 73
DRG: 377 | End: 2018-08-16
Payer: MEDICARE

## 2018-08-16 VITALS — OXYGEN SATURATION: 100 % | DIASTOLIC BLOOD PRESSURE: 85 MMHG | SYSTOLIC BLOOD PRESSURE: 114 MMHG | TEMPERATURE: 96.8 F

## 2018-08-16 LAB
ABSOLUTE EOS #: 0.23 K/UL (ref 0–0.44)
ABSOLUTE IMMATURE GRANULOCYTE: <0.03 K/UL (ref 0–0.3)
ABSOLUTE LYMPH #: 0.71 K/UL (ref 1.1–3.7)
ABSOLUTE MONO #: 0.67 K/UL (ref 0.1–1.2)
ALBUMIN SERPL-MCNC: 2.8 G/DL (ref 3.5–5.2)
ALBUMIN/GLOBULIN RATIO: 0.9 (ref 1–2.5)
ALP BLD-CCNC: 67 U/L (ref 40–129)
ALT SERPL-CCNC: 5 U/L (ref 5–41)
ANION GAP SERPL CALCULATED.3IONS-SCNC: 10 MMOL/L (ref 9–17)
AST SERPL-CCNC: 12 U/L
BASOPHILS # BLD: 0 % (ref 0–2)
BASOPHILS ABSOLUTE: <0.03 K/UL (ref 0–0.2)
BILIRUB SERPL-MCNC: 1.37 MG/DL (ref 0.3–1.2)
BUN BLDV-MCNC: 7 MG/DL (ref 8–23)
BUN/CREAT BLD: ABNORMAL (ref 9–20)
CALCIUM SERPL-MCNC: 8.3 MG/DL (ref 8.6–10.4)
CHLORIDE BLD-SCNC: 102 MMOL/L (ref 98–107)
CO2: 22 MMOL/L (ref 20–31)
CREAT SERPL-MCNC: 0.53 MG/DL (ref 0.7–1.2)
DIFFERENTIAL TYPE: ABNORMAL
EOSINOPHILS RELATIVE PERCENT: 4 % (ref 1–4)
GFR AFRICAN AMERICAN: >60 ML/MIN
GFR NON-AFRICAN AMERICAN: >60 ML/MIN
GFR SERPL CREATININE-BSD FRML MDRD: ABNORMAL ML/MIN/{1.73_M2}
GFR SERPL CREATININE-BSD FRML MDRD: ABNORMAL ML/MIN/{1.73_M2}
GLUCOSE BLD-MCNC: 98 MG/DL (ref 70–99)
HCT VFR BLD CALC: 33.7 % (ref 40.7–50.3)
HEMOGLOBIN: 9.9 G/DL (ref 13–17)
IMMATURE GRANULOCYTES: 0 %
INR BLD: 1.4
LYMPHOCYTES # BLD: 12 % (ref 24–43)
MCH RBC QN AUTO: 28.6 PG (ref 25.2–33.5)
MCHC RBC AUTO-ENTMCNC: 29.4 G/DL (ref 28.4–34.8)
MCV RBC AUTO: 97.4 FL (ref 82.6–102.9)
MONOCYTES # BLD: 11 % (ref 3–12)
NRBC AUTOMATED: 0 PER 100 WBC
PDW BLD-RTO: 14.6 % (ref 11.8–14.4)
PLATELET # BLD: 149 K/UL (ref 138–453)
PLATELET ESTIMATE: ABNORMAL
PMV BLD AUTO: 9.4 FL (ref 8.1–13.5)
POC INR: 1.4
POTASSIUM SERPL-SCNC: 3.8 MMOL/L (ref 3.7–5.3)
PROTHROMBIN TIME, POC: 16.6 SEC (ref 10.4–14.2)
PROTHROMBIN TIME: 14.2 SEC (ref 9–12)
RBC # BLD: 3.46 M/UL (ref 4.21–5.77)
RBC # BLD: ABNORMAL 10*6/UL
SEG NEUTROPHILS: 73 % (ref 36–65)
SEGMENTED NEUTROPHILS ABSOLUTE COUNT: 4.27 K/UL (ref 1.5–8.1)
SODIUM BLD-SCNC: 134 MMOL/L (ref 135–144)
TOTAL PROTEIN: 5.8 G/DL (ref 6.4–8.3)
WBC # BLD: 5.9 K/UL (ref 3.5–11.3)
WBC # BLD: ABNORMAL 10*3/UL

## 2018-08-16 PROCEDURE — 94762 N-INVAS EAR/PLS OXIMTRY CONT: CPT

## 2018-08-16 PROCEDURE — 6370000000 HC RX 637 (ALT 250 FOR IP): Performed by: STUDENT IN AN ORGANIZED HEALTH CARE EDUCATION/TRAINING PROGRAM

## 2018-08-16 PROCEDURE — 85610 PROTHROMBIN TIME: CPT

## 2018-08-16 PROCEDURE — 3700000001 HC ADD 15 MINUTES (ANESTHESIA): Performed by: INTERNAL MEDICINE

## 2018-08-16 PROCEDURE — 94660 CPAP INITIATION&MGMT: CPT

## 2018-08-16 PROCEDURE — 43247 EGD REMOVE FOREIGN BODY: CPT | Performed by: INTERNAL MEDICINE

## 2018-08-16 PROCEDURE — 2709999900 HC NON-CHARGEABLE SUPPLY: Performed by: INTERNAL MEDICINE

## 2018-08-16 PROCEDURE — 6360000002 HC RX W HCPCS: Performed by: NURSE ANESTHETIST, CERTIFIED REGISTERED

## 2018-08-16 PROCEDURE — 2580000003 HC RX 258: Performed by: STUDENT IN AN ORGANIZED HEALTH CARE EDUCATION/TRAINING PROGRAM

## 2018-08-16 PROCEDURE — 1200000000 HC SEMI PRIVATE

## 2018-08-16 PROCEDURE — 3609012900 HC EGD FOREIGN BODY REMOVAL: Performed by: INTERNAL MEDICINE

## 2018-08-16 PROCEDURE — 74018 RADEX ABDOMEN 1 VIEW: CPT

## 2018-08-16 PROCEDURE — 94640 AIRWAY INHALATION TREATMENT: CPT

## 2018-08-16 PROCEDURE — C1726 CATH, BAL DIL, NON-VASCULAR: HCPCS | Performed by: INTERNAL MEDICINE

## 2018-08-16 PROCEDURE — 43249 ESOPH EGD DILATION <30 MM: CPT | Performed by: INTERNAL MEDICINE

## 2018-08-16 PROCEDURE — 2500000003 HC RX 250 WO HCPCS: Performed by: NURSE ANESTHETIST, CERTIFIED REGISTERED

## 2018-08-16 PROCEDURE — 0D768ZZ DILATION OF STOMACH, VIA NATURAL OR ARTIFICIAL OPENING ENDOSCOPIC: ICD-10-PCS | Performed by: INTERNAL MEDICINE

## 2018-08-16 PROCEDURE — C1773 RET DEV, INSERTABLE: HCPCS | Performed by: INTERNAL MEDICINE

## 2018-08-16 PROCEDURE — 99233 SBSQ HOSP IP/OBS HIGH 50: CPT | Performed by: INTERNAL MEDICINE

## 2018-08-16 PROCEDURE — 7100000001 HC PACU RECOVERY - ADDTL 15 MIN: Performed by: INTERNAL MEDICINE

## 2018-08-16 PROCEDURE — 36415 COLL VENOUS BLD VENIPUNCTURE: CPT

## 2018-08-16 PROCEDURE — 3700000000 HC ANESTHESIA ATTENDED CARE: Performed by: INTERNAL MEDICINE

## 2018-08-16 PROCEDURE — 3609012500 HC EGD DILATION BALLOON: Performed by: INTERNAL MEDICINE

## 2018-08-16 PROCEDURE — 0DC68ZZ EXTIRPATION OF MATTER FROM STOMACH, VIA NATURAL OR ARTIFICIAL OPENING ENDOSCOPIC: ICD-10-PCS | Performed by: INTERNAL MEDICINE

## 2018-08-16 PROCEDURE — 80053 COMPREHEN METABOLIC PANEL: CPT

## 2018-08-16 PROCEDURE — 2500000003 HC RX 250 WO HCPCS: Performed by: INTERNAL MEDICINE

## 2018-08-16 PROCEDURE — 7100000000 HC PACU RECOVERY - FIRST 15 MIN: Performed by: INTERNAL MEDICINE

## 2018-08-16 PROCEDURE — 85025 COMPLETE CBC W/AUTO DIFF WBC: CPT

## 2018-08-16 RX ORDER — PROMETHAZINE HYDROCHLORIDE 25 MG/ML
6.25 INJECTION, SOLUTION INTRAMUSCULAR; INTRAVENOUS EVERY 6 HOURS PRN
Status: DISCONTINUED | OUTPATIENT
Start: 2018-08-16 | End: 2018-08-16

## 2018-08-16 RX ORDER — FENTANYL CITRATE 50 UG/ML
INJECTION, SOLUTION INTRAMUSCULAR; INTRAVENOUS PRN
Status: DISCONTINUED | OUTPATIENT
Start: 2018-08-16 | End: 2018-08-16 | Stop reason: SDUPTHER

## 2018-08-16 RX ORDER — WATER 1000 ML/1000ML
INJECTION, SOLUTION INTRAVENOUS PRN
Status: DISCONTINUED | OUTPATIENT
Start: 2018-08-16 | End: 2018-08-16 | Stop reason: HOSPADM

## 2018-08-16 RX ORDER — PROPOFOL 10 MG/ML
INJECTION, EMULSION INTRAVENOUS PRN
Status: DISCONTINUED | OUTPATIENT
Start: 2018-08-16 | End: 2018-08-16 | Stop reason: SDUPTHER

## 2018-08-16 RX ORDER — ETOMIDATE 2 MG/ML
INJECTION INTRAVENOUS PRN
Status: DISCONTINUED | OUTPATIENT
Start: 2018-08-16 | End: 2018-08-16 | Stop reason: SDUPTHER

## 2018-08-16 RX ORDER — PROMETHAZINE HYDROCHLORIDE 25 MG/ML
6.25 INJECTION, SOLUTION INTRAMUSCULAR; INTRAVENOUS EVERY 6 HOURS PRN
Status: DISCONTINUED | OUTPATIENT
Start: 2018-08-16 | End: 2018-08-19 | Stop reason: HOSPADM

## 2018-08-16 RX ORDER — ECHINACEA PURPUREA EXTRACT 125 MG
1 TABLET ORAL PRN
Status: DISCONTINUED | OUTPATIENT
Start: 2018-08-16 | End: 2018-08-19 | Stop reason: HOSPADM

## 2018-08-16 RX ORDER — ACETAMINOPHEN 325 MG/1
650 TABLET ORAL ONCE
Status: COMPLETED | OUTPATIENT
Start: 2018-08-16 | End: 2018-08-16

## 2018-08-16 RX ADMIN — ALBUTEROL SULFATE 2 MG: 2 TABLET ORAL at 20:28

## 2018-08-16 RX ADMIN — MOMETASONE FUROATE AND FORMOTEROL FUMARATE DIHYDRATE 2 PUFF: 200; 5 AEROSOL RESPIRATORY (INHALATION) at 20:57

## 2018-08-16 RX ADMIN — SODIUM CHLORIDE: 9 INJECTION, SOLUTION INTRAVENOUS at 14:40

## 2018-08-16 RX ADMIN — GABAPENTIN 300 MG: 300 CAPSULE ORAL at 20:28

## 2018-08-16 RX ADMIN — MICONAZOLE NITRATE: 20.6 POWDER TOPICAL at 22:37

## 2018-08-16 RX ADMIN — SALINE NASAL SPRAY 1 SPRAY: 1.5 SOLUTION NASAL at 22:37

## 2018-08-16 RX ADMIN — PHENYLEPHRINE HYDROCHLORIDE 100 MCG: 10 INJECTION INTRAVENOUS at 14:34

## 2018-08-16 RX ADMIN — FENTANYL CITRATE 25 MCG: 50 INJECTION INTRAMUSCULAR; INTRAVENOUS at 13:50

## 2018-08-16 RX ADMIN — MOMETASONE FUROATE AND FORMOTEROL FUMARATE DIHYDRATE 2 PUFF: 200; 5 AEROSOL RESPIRATORY (INHALATION) at 08:02

## 2018-08-16 RX ADMIN — IPRATROPIUM BROMIDE AND ALBUTEROL SULFATE 3 ML: .5; 3 SOLUTION RESPIRATORY (INHALATION) at 20:57

## 2018-08-16 RX ADMIN — SUCRALFATE 1 G: 1 TABLET ORAL at 17:40

## 2018-08-16 RX ADMIN — FENTANYL CITRATE 50 MCG: 50 INJECTION INTRAMUSCULAR; INTRAVENOUS at 13:14

## 2018-08-16 RX ADMIN — IPRATROPIUM BROMIDE AND ALBUTEROL SULFATE 3 ML: .5; 3 SOLUTION RESPIRATORY (INHALATION) at 08:02

## 2018-08-16 RX ADMIN — ETOMIDATE 14 MG: 2 INJECTION INTRAVENOUS at 13:14

## 2018-08-16 RX ADMIN — MICONAZOLE NITRATE: 20.6 POWDER TOPICAL at 08:38

## 2018-08-16 RX ADMIN — SUCRALFATE 1 G: 1 TABLET ORAL at 22:37

## 2018-08-16 RX ADMIN — ACETAMINOPHEN 650 MG: 325 TABLET ORAL at 22:00

## 2018-08-16 RX ADMIN — PROPOFOL 100 MG: 10 INJECTION, EMULSION INTRAVENOUS at 13:14

## 2018-08-16 RX ADMIN — FENTANYL CITRATE 25 MCG: 50 INJECTION INTRAMUSCULAR; INTRAVENOUS at 14:49

## 2018-08-16 RX ADMIN — Medication 10 ML: at 20:29

## 2018-08-16 ASSESSMENT — PULMONARY FUNCTION TESTS
PIF_VALUE: 24
PIF_VALUE: 4
PIF_VALUE: 0
PIF_VALUE: 29
PIF_VALUE: 24
PIF_VALUE: 25
PIF_VALUE: 30
PIF_VALUE: 0
PIF_VALUE: 30
PIF_VALUE: 25
PIF_VALUE: 25
PIF_VALUE: 30
PIF_VALUE: 25
PIF_VALUE: 0
PIF_VALUE: 30
PIF_VALUE: 28
PIF_VALUE: 0
PIF_VALUE: 1
PIF_VALUE: 25
PIF_VALUE: 25
PIF_VALUE: 21
PIF_VALUE: 30
PIF_VALUE: 24
PIF_VALUE: 16
PIF_VALUE: 30
PIF_VALUE: 21
PIF_VALUE: 25
PIF_VALUE: 29
PIF_VALUE: 30
PIF_VALUE: 29
PIF_VALUE: 30
PIF_VALUE: 29
PIF_VALUE: 30
PIF_VALUE: 1
PIF_VALUE: 0
PIF_VALUE: 25
PIF_VALUE: 30
PIF_VALUE: 25
PIF_VALUE: 31
PIF_VALUE: 19
PIF_VALUE: 30
PIF_VALUE: 30
PIF_VALUE: 29
PIF_VALUE: 25
PIF_VALUE: 30
PIF_VALUE: 30
PIF_VALUE: 14
PIF_VALUE: 30
PIF_VALUE: 29
PIF_VALUE: 29
PIF_VALUE: 25
PIF_VALUE: 29
PIF_VALUE: 30
PIF_VALUE: 30
PIF_VALUE: 25
PIF_VALUE: 1
PIF_VALUE: 25
PIF_VALUE: 30
PIF_VALUE: 30
PIF_VALUE: 24
PIF_VALUE: 29
PIF_VALUE: 25
PIF_VALUE: 1
PIF_VALUE: 30
PIF_VALUE: 0
PIF_VALUE: 21
PIF_VALUE: 1
PIF_VALUE: 30
PIF_VALUE: 25
PIF_VALUE: 1
PIF_VALUE: 1
PIF_VALUE: 21
PIF_VALUE: 5
PIF_VALUE: 24
PIF_VALUE: 13
PIF_VALUE: 25
PIF_VALUE: 30
PIF_VALUE: 25
PIF_VALUE: 30
PIF_VALUE: 19
PIF_VALUE: 24
PIF_VALUE: 30
PIF_VALUE: 30
PIF_VALUE: 21
PIF_VALUE: 0
PIF_VALUE: 30
PIF_VALUE: 4
PIF_VALUE: 29
PIF_VALUE: 25
PIF_VALUE: 25
PIF_VALUE: 31
PIF_VALUE: 30
PIF_VALUE: 1
PIF_VALUE: 42
PIF_VALUE: 30
PIF_VALUE: 29
PIF_VALUE: 29
PIF_VALUE: 25

## 2018-08-16 ASSESSMENT — PAIN DESCRIPTION - LOCATION
LOCATION: ABDOMEN
LOCATION: HEAD

## 2018-08-16 ASSESSMENT — PAIN DESCRIPTION - ONSET: ONSET: ON-GOING

## 2018-08-16 ASSESSMENT — PAIN SCALES - GENERAL
PAINLEVEL_OUTOF10: 6
PAINLEVEL_OUTOF10: 0
PAINLEVEL_OUTOF10: 6

## 2018-08-16 ASSESSMENT — PAIN DESCRIPTION - DESCRIPTORS: DESCRIPTORS: ACHING

## 2018-08-16 ASSESSMENT — PAIN DESCRIPTION - FREQUENCY: FREQUENCY: INTERMITTENT

## 2018-08-16 ASSESSMENT — PAIN DESCRIPTION - PAIN TYPE: TYPE: ACUTE PAIN

## 2018-08-16 ASSESSMENT — PAIN - FUNCTIONAL ASSESSMENT: PAIN_FUNCTIONAL_ASSESSMENT: 0-10

## 2018-08-16 NOTE — PROGRESS NOTES
ADDENDUM: Further details of recent endoscopy provided by Bariatric surgery, level of luminal narrowing/stricturing is just distal to the pseudopouch and proximal to the true gastric body (area surrounded by surgical mesh). Will perform Diagnostic EGD to fully assess length of narrowing, feasibility of CRE dilation, and risk of complications. Explained risks of CRE dilation to patient which included risk of perforation, bleeding, and infections, with risk of perforation and bleeding higher with long standing surgical strictures. Patient had understood the risks with full capacity and agreed to proceed with both the diagnostic procedure +/- therapeutic (CRE dilation). Should there be complications there would be a need for an emergent surgery which patient also understood with all the risks, benefits, and alternatives explained. Further recommendations until after the procedure today.

## 2018-08-16 NOTE — FLOWSHEET NOTE
Pre-Surgical Spiritual Care Note  Stopped to see pt per Surgery Schedule for 8/16. Pt was open to 's visit, but appeared confused when  said that he had been on the \"surgery schedule. \"  Pt told  that he will have upper GI done tomorrow. He said that this is not the first time that he has had this procedure. He indicated that he is somewhat frustrated that medical staff has not been able to tell him why he is bleeding in his gastric tract. Pt indicated that he has good support from his, 2 sons, & gr-children who were all her today. Pt is a member of [employer]. He readily accepted 's offer of prayer. Chaplains will remain available to offer spiritual and emotional support as needed. Rev. Theodore Austerlitz, [address] [place] [address] Road     08/15/18 1924   Encounter Summary   Services provided to: Patient   Referral/Consult From: Multi-disciplinary team  (Surgery Schedule)   Support System Spouse; Children;Family members; Restoration/tahir community   Place of [address] [place] [address] Visiting (8/15)   Complexity of Encounter Low   Length of Encounter 15 minutes   Spiritual Assessment Completed Yes   Routine   Type Pre-procedure  (Initial)   Assessment Calm; Approachable;Coping; Anxious; Hopeful   Intervention Sustaining presence/ Ministry of presence; Active listening;Explored feelings, thoughts, concerns;Explored coping resources; Discussed illness/injury and it's impact; Discussed belief system/Confucianist practices/tahir;Nurtured hope;Prayer  (left Spiritual Care info)   Outcome Receptive; Acceptance; Coping; Hopeful;Encouraged;Engaged in conversation;Expressed feelings/needs/concerns;Comfort;Expressed gratitude

## 2018-08-16 NOTE — OP NOTE
[facility] ENDOSCOPY     PROCEDURE DATE: 08/16/18    REFERRING PHYSICIAN: No ref. provider found     PRIMARY CARE PROVIDER: Hernando Rodriguez MD    ATTENDING PHYSICIAN: William Franco MD     HISTORY: Mr. Luella Hodgkins is a 71 y.o. male who presents to the [facility] Endoscopy unit/OR for upper endoscopy. The patient's clinical history is remarkable for A-fib, history of vertical gastroplasty c/b post-surgical narrowing. He is currently medically stable and appropriate for the planned procedure. PREOPERATIVE DIAGNOSIS: Post-surgical narrowing of gastric pouch luminal outlet     PROCEDURES:   1) Transoral Upper Endoscopy with CRE dilation. Food bolus extraction and dilution and irrigation of Barium contrast (partial concretion present). POSTOPERATIVE DIAGNOSIS: gastric outlet obstruction post-surgical gastric pouch. Food impaction. MEDICATIONS: General Anesthesia     INSTRUMENT: Olympus GIF-H180 flexible Gastroscope. PREPARATION: The nature and character of the procedure as well as risks, benefits, and alternatives were discussed with the patient and informed consent was obtained. Complications were said to include, but were not limited to: medication allergy, medication reaction, cardiovascular and respiratory problems, bleeding, perforation, infection, and/or missed diagnosis. Following arrival in the endoscopy room, the patient was placed in the left lateral decubitus position and final time-out accomplished in the presence of the nursing staff. Baseline vital signs were obtained and reviewed, and IV sedation was subsequently initiated. FINDINGS:   Esophagus: The esophagus was inspected to the Z-line. The endoscopic exam showed small amount of residual contrast in the esophagus. GEJ was identified at 44 cm from incisors. Stomach: The stomach was inspected in both forward and retroflex fashion and was appropriately distensible.  The cardia, fundus, incisura, antrum and pylorus were identified via direct visualization. The endoscopic exam showed evidence of the prior surgery. Surgically created gastric pouch was identified from 44 cm to 51 cm. Evidence of residual contrast was visualized, partially solidified. Large food debri identified at the apex of this pouch. The outlet to the pouch was identified at approximately 51 cm. This was narrowed with no evidence of ulcerations or bleeding. The gastroscope was able to traverse the segment which extending until 53 cm ( 2 cm short segment). The stomach body was seen and appeared grossly normal. The CRE balloon Cath was used (filled with contrast) and dilated at the narrowed segment using fluoroscopic guidance. Balloon was inflated to 8mm then 9 mm then 10mm in short minute segments. Small mucosal breaks were seen reflected adequate dilation. The gastroscope was then able to passively traverse the segment with out any resistance. After dilation, the food debri/impaction in the gastric pouch was removed with the dior net. The residual contrast was then aggressively irrigated to avoid concretion. Duodenum: The proximal small bowel was inspected through the bulb, sweep, and second portion of the duodenum. The endoscopic exam showed normal.       RECOMMENDATIONS:   1) Can place patient on CLD for today and assess tolerance  2)  Would restart patients [facility] tomorrow if there is no evidence of bleeding  3) Patient may require repeat dilations with larger diameter CRE balloons should there be recurrence of patients symptoms.    4) GI will follow      Liz Jaquez MD

## 2018-08-16 NOTE — ANESTHESIA PRE PROCEDURE
[MAR Hold] potassium chloride 20 MEQ/15ML (10%) oral solution 40 mEq  40 mEq Oral PRN Constance Torres MD        Or   [facility] Hold] potassium chloride 10 mEq/100 mL IVPB (Peripheral Line)  10 mEq Intravenous PRN Constance Torres MD        [facility] Hold] Armodafinil TABS 200 mg  1 tablet Mouth/Throat QAM Constance Torres MD        [facility] Hold] mometasone-formoterol [facility]) 200-5 MCG/ACT inhaler 2 puff  2 puff Inhalation BID Constance Torres MD   2 puff at 08/16/18 0802    [MAR Hold] pneumococcal 13-valent conjugate (PREVNAR) injection 0.5 mL  0.5 mL Intramuscular Once Mbonu Janny De La Garza MD        [facility] Hold] 0.9 % sodium chloride infusion   Intravenous Continuous Mike Pappas MD 75 mL/hr at 08/15/18 1109      [MAR Hold] miconazole (MICOTIN) 2 % powder   Topical BID Constance Torres MD        [facility] Hold] metoclopramide (REGLAN) 5 MG/5ML solution 5 mg  5 mg Oral 4x Daily AC & HS Nhi Ku, DO   5 mg at 08/13/18 1831    [MAR Hold] pantoprazole (PROTONIX) tablet 40 mg  40 mg Oral QAM AC Loy Rush Jr., DO   40 mg at 08/15/18 1758       Allergies:     Allergies   Allergen Reactions    Darvocet [Propoxyphene N-Acetaminophen] Hives    Other Hives    Oxycodone-Acetaminophen        Problem List:    Patient Active Problem List   Diagnosis Code    Atrial fibrillation (HCC) I48.91    Hyperlipidemia E78.5    GERD (gastroesophageal reflux disease) K21.9    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    OA (osteoarthritis) of knee, bilateral M17.10    Foot drop, right M21.371    Impaired hearing H91.90    Chronic rhinosinusitis J32.9    Special screening for malignant neoplasm of prostate Z12.5    Lymphedema of both lower extremities I89.0    Hallux valgus, acquired, bilateral M20.11, M20.12    Tinnitus H93.19    Gait disorder R26.9    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    Fracture of femur ([address] S72.90XA    Weight loss R63.4    Hypertension I10    Slow transit constipation K59.01    Internal hemorrhoids K64.8    Calculus of gallbladder without cholecystitis without obstruction K80.20    MARIAMA on CPAP G47.33, Z99.89    Chronic diastolic heart failure (HCC) I50.32    Moderate obesity E66.8    Simple chronic bronchitis (HCC) J41.0    Ileus (HCC) K56.7    Acute gastric ulcer without hemorrhage or perforation K25.3    History of vertical band gastroplasty Z98.890    Acute superficial gastritis without hemorrhage K29.00    Hematemesis K92.0    Acute gastric ulcer with bleeding K25.0       Past Medical History:        Diagnosis Date    Asthma     Atrial fibrillation (HCC)     GERD (gastroesophageal reflux disease)     Hyperlipidemia     Hypertension     Obesity     Osteoarthritis     Unspecified sleep apnea        Past Surgical History:        Procedure Laterality Date    CARPAL TUNNEL RELEASE      bilateral    COLONOSCOPY      FINGER AMPUTATION  1966    first knuckle right index finger    GASTRIC BYPASS SURGERY  1985    vertical banded gastroplasty    HEMORRHOID SURGERY      JOINT REPLACEMENT  2004 & 2005    bilateral knees    [place] EGD TRANSORAL BIOPSY SINGLE/MULTIPLE N/A 5/25/2018    EGD BIOPSY performed by Maribel Mercado DO at [address] [facility]      left shoulder    UPPER GASTROINTESTINAL ENDOSCOPY  08/13/2018    removal of food bolus    UPPER GASTROINTESTINAL ENDOSCOPY N/A 8/13/2018    EGD FOREIGN BODY REMOVAL performed by Maribel Mercado DO at [facility] N/A 8/13/2018    EGD BIOPSY performed by Maribel Mercado DO at [address] History:    Social History   Substance Use Topics    Smoking status: Former Smoker     Packs/day: 1.00     Years: 20.00     Quit date: 12/14/1976    Smokeless tobacco: Never Used    Alcohol use No                                Counseling given: Not Answered      Vital Signs (Current):   Vitals:    08/16/18 0436 08/16/18 0500 08/16/18 0729 08/16/18 1116   BP:   131/70 112/70 Nursing notes reviewed no history of anesthetic complications:   Airway: Mallampati: II  TM distance: >3 FB   Neck ROM: full  Mouth opening: > = 3 FB Dental:    (+) edentulous      Pulmonary:normal exam    (+) sleep apnea:  asthma:                            Cardiovascular:  Exercise tolerance: good (>4 METS),   (+) hypertension:, dysrhythmias: atrial fibrillation, hyperlipidemia    (-) past MI, CAD, CABG/stent,  angina and  CHF    ECG reviewed               Beta Blocker:  Not on Beta Blocker         Neuro/Psych:   Negative Neuro/Psych ROS              GI/Hepatic/Renal:   (+) GERD:, PUD,           Endo/Other:                     Abdominal:           Vascular: negative vascular ROS. Anesthesia Plan      general     ASA 3       Induction: intravenous. MIPS: Prophylactic antiemetics administered. Anesthetic plan and risks discussed with patient.       Plan discussed with CRNA and surgical team.                  Hermelinda Pineda MD   8/16/2018

## 2018-08-16 NOTE — PROGRESS NOTES
findings with the resident and the team.  I have seen and examined the patient and the key elements of the encounter have been performed by me. I agree with the assessment, plan and orders as documented by the resident.         Marisela Becerra MD, CHARLENE, [facility]  Attending Physician, Internal Medicine Service    Internal Medicine Residency Program  8/16/2018, 11:48 AM

## 2018-08-16 NOTE — PLAN OF CARE
Problem: Falls - Risk of:  Goal: Will remain free from falls  Will remain free from falls   Outcome: Ongoing  Pt remains free of falls at this time. Bed locked in lowest position, siderails x2, call light in reach. Non-skid footwear applied. Encouraged pt to call for assistance as needed for safety. Falling star posted outside of room. Problem: Pain:  Goal: Pain level will decrease  Pain level will decrease   Outcome: Ongoing  Pt able to communicate pain as needed, uses call light appropriately. Pt satisfied with pain interventions.

## 2018-08-16 NOTE — PROGRESS NOTES
BRONCHOSPASM/BRONCHOCONSTRICTION     [x]         IMPROVE AERATION/BREATH SOUNDS  [x]   ADMINISTER BRONCHODILATOR THERAPY AS APPROPRIATE  [x]   ASSESS BREATH SOUNDS  [x]   IMPLEMENT AEROSOL/MDI PROTOCOL  NON INVASIVE VENTILATION  PROVIDE OPTIMAL VENTILATION/ACCEPTABLE SP02  IMPLEMENT NON INVASIVE VENTILATION PROTOCOL  ASSESSMENT SKIN INTEGRITY  PATIENT EDUCATION AS NEEDED  BIPAP AS NEEDED[x]   PATIENT EDUCATION AS NEEDED

## 2018-08-16 NOTE — PROGRESS NOTES
Surgery Progress Note            PATIENT NAME: Brandon Narvaez     TODAY'S DATE: 8/16/2018, 7:12 AM    SUBJECTIVE:    Pt  Seen and examined. No acute events overnight. Denies N/V this AM. NPO. OBJECTIVE:   VITALS:  /71   Pulse 83   Temp 98.3 °F (36.8 °C) (Oral)   Resp 22   Ht 5' 10\" (1.778 m)   Wt 256 lb 9.9 oz (116.4 kg)   SpO2 100%   BMI 36.82 kg/m²      INTAKE/OUTPUT:      Intake/Output Summary (Last 24 hours) at 08/16/18 3679  Last data filed at 08/16/18 0525   Gross per 24 hour   Intake             1925 ml   Output             1175 ml   Net              750 ml       PHYSICAL EXAM  General Appearance:  awake, alert, oriented, in no acute distress  Lungs:  Normal expansion. Clear to auscultation. No rales, rhonchi, or wheezing. Heart:  Heart regular rate and rhythm  Abdomen:  Soft, non distended, mild tenderness in epigastric region, no peritoneal signs  Musculoskeletal:  No joint swelling, deformity, or tenderness.          Data:  CBC with Differential:    Lab Results   Component Value Date    WBC 5.9 08/16/2018    RBC 3.46 08/16/2018    RBC 4.39 09/13/2011    HGB 9.9 08/16/2018    HCT 33.7 08/16/2018     08/16/2018     09/13/2011    MCV 97.4 08/16/2018    MCH 28.6 08/16/2018    MCHC 29.4 08/16/2018    RDW 14.6 08/16/2018    LYMPHOPCT 12 08/16/2018    LYMPHOPCT 21.2 10/30/2013    MONOPCT 11 08/16/2018    MONOPCT 7.9 10/30/2013    EOSPCT 7.9 10/30/2013    BASOPCT 0 08/16/2018    BASOPCT 0.7 10/30/2013    MONOSABS 0.67 08/16/2018    MONOSABS 0.5 10/30/2013    LYMPHSABS 0.71 08/16/2018    LYMPHSABS 1.4 10/30/2013    EOSABS 0.23 08/16/2018    EOSABS 0.5 10/30/2013    BASOSABS <0.03 08/16/2018    DIFFTYPE NOT REPORTED 08/16/2018     BMP:    Lab Results   Component Value Date     08/16/2018    K 3.8 08/16/2018     08/16/2018    CO2 22 08/16/2018    BUN 7 08/16/2018    LABALBU 2.8 08/16/2018    CREATININE 0.53 08/16/2018    CALCIUM 8.3 08/16/2018    GFRAA >60 08/16/2018 LABGLOM >60 08/16/2018    GLUCOSE 98 08/16/2018    GLUCOSE 99 09/13/2011       Radiology Review:      ASSESSMENT   1. S/p EGD with removal of food bolus  2. gastritis  3. Hx of vertical banded gastroplasty     Plan  1. Pain control  2. Carafate, PPI, reglan  3. EGD with GI today  4.  Medical management per primary    Electronically signed by Randee Parker DO  on 8/16/2018 at 7:12 AM

## 2018-08-16 NOTE — PROGRESS NOTES
ondansetron (ZOFRAN-ODT) 4 MG disintegrating tablet Take 4 mg by mouth 4/29/18   Historical Provider, MD   ipratropium-albuterol (DUONEB) 0.5-2.5 (3) MG/3ML SOLN nebulizer solution Inhale 1 vial into the lungs every 4 hours    Historical Provider, MD   Armodafinil 200 MG TABS Take 1 tablet by mouth every morning. . 4/2/18   Historical Provider, MD   ciclopirox (PENLAC) 8 % solution Apply topically nightly to nails 12/1/17   Jes Huddleston MD   SYMBICORT 160-4.5 MCG/ACT AERO  10/9/17   Historical Provider, MD   albuterol (PROVENTIL) 2 MG tablet TAKE 1 TABLET BY MOUTH 3 TIMES A DAY 7/27/17   Historical Provider, MD   albuterol (PROVENTIL HFA;VENTOLIN HFA) 108 (90 BASE) MCG/ACT inhaler Inhale 2 puffs into the lungs every 6 hours as needed for Wheezing.     Historical Provider, MD   .  Recent Surgical History: None = 0     Assessment     Peak Flow (asthma only)    Predicted: NA  Personal Best: NA  PEF NA  % Predicted NA  Peak Flow : not applicable = 0    IBN1/AXM    FEV1 Predictedna      FEV1 na    FEV1 % Predicted na  FVC na  IS volume na  IBW na    RR 18  Breath Sounds: clear,diminished      · Bronchodilator assessment at level  0 home meds  · Hyperinflation assessment at level   · Secretion Management assessment at level    ·   · []    Bronchodilator Assessment  BRONCHODILATOR ASSESSMENT SCORE  Score 0 1 2 3 4 5   Breath Sounds   [x]  Patient Baseline []  No Wheeze good aeration []  Faint, scattered wheezing, good aeration []  Expiratory Wheezing and or moderately diminished []  Insp/Exp wheeze and/or very diminished []  Insp/Exp and/ or marked distress   Respiratory Rate   [x]  Patient Baseline []  Less than 20 []  Less than 20 []  20-25 []  Greater than 25 []  Greater than 25   Peak flow % of Pred or PB [x]  NA   []  Greater than 90%  []  81-90% []  71-80% []  Less than or equal to 70%  or unable to perform []  Unable due to Respiratory Distress   Dyspnea re [x]  Patient Baseline []  No SOB []  No SOB []  SOB on 2. 85 3.04 3.24 3.45 3.66 50 - 53 2.58 2.80 3.02 3.25 3.49 3.73 3.98 4.24   54 - 57 2.21 2.38 2.57 2.75 2.95 3.14 3.35 3.56 54 - 57 2.46 2.67 2.89 3.12 3.36 3.60 3.85 4.11   58 - 61 2.10 2.28 2.46 2.65 2.84 3.04 3.24 3.45 58 - 61 2.32 2.54 2.76 2.99 3.23 3.47 3.72 3.98   62 - 65 1.99 2.17 2.35 2.54 2.73 2.93 3.13 3.34 62 - 65 2.19 2.40 2.62 2.85 3.09 3.33 3.58 3.84   66 - 69 1.88 2.05 2.23 2.42 2.61 2.81 3.02 3.23 66 - 69 2.04 2.26 2.48 2.71 2.95 3.19 3.44 3.70   70+ 1.82 1.99 2.17 2.36 2.55 2.75 2.95 3.16 70+ 1.97 2.19 2.41 2.64 2.87 3.12 3.37 3.62             Predicted Peak Expiratory Flow Rate                                       Height (in)  Female       Height (in) Male           Age 64 63 56 61 58 73 78 74 Age            21 344 357 372 387 402 417 432 446  60 62 64 66 68 70 72 74 76   25 337 352 366 381 396 411 426 441 25 447 476 [phone] [phone]   30 329 344 359 374 389 404 419 434 30 437 466 494 523 [phone] 667   35 322 337 351 366 381 396 411 426 35 426 455 484 512 541 570 [phone]   40 314 329 344 359 374 389 404 419 40 416 445 473 [phone] 647   45 307 322 336 351 366 381 396 411 45 405 434 463 491 520 549 577 606 636   50 299 314 329 344 359 374 389 404 50 395 424 452 481 [phone] 625   55 292 307 321 336 351 366 381 396 55 384 413 442 470 499 528 556 585 615   60 284 299 314 329 344 359 374 389 60 374 403 431 460 489 517 546 575 605   65 277 292 306 321 336 351 366 381 65 363 392 421 449 478 507 535 564 594   70 269 284 299 314 329 344 359 374 70 353 382 410 439 468 [phone] 583   75 261 274 289 305 319 334 348 364 75 344 372 400 429 458 487 515 544 573   80 253 266 282 296 312 327 342 356 80 335 362 390 419 448 476 [phone]

## 2018-08-17 LAB
ABSOLUTE EOS #: 0.2 K/UL (ref 0–0.4)
ABSOLUTE IMMATURE GRANULOCYTE: 0 K/UL (ref 0–0.3)
ABSOLUTE LYMPH #: 0.82 K/UL (ref 1–4.8)
ABSOLUTE MONO #: 0.2 K/UL (ref 0.1–0.8)
ALBUMIN SERPL-MCNC: 2.4 G/DL (ref 3.5–5.2)
ALBUMIN/GLOBULIN RATIO: 1 (ref 1–2.5)
ALP BLD-CCNC: 58 U/L (ref 40–129)
ALT SERPL-CCNC: <5 U/L (ref 5–41)
ANION GAP SERPL CALCULATED.3IONS-SCNC: 11 MMOL/L (ref 9–17)
AST SERPL-CCNC: 11 U/L
BASOPHILS # BLD: 0 % (ref 0–2)
BASOPHILS ABSOLUTE: 0 K/UL (ref 0–0.2)
BILIRUB SERPL-MCNC: 1.12 MG/DL (ref 0.3–1.2)
BUN BLDV-MCNC: 5 MG/DL (ref 8–23)
BUN/CREAT BLD: ABNORMAL (ref 9–20)
CALCIUM SERPL-MCNC: 7 MG/DL (ref 8.6–10.4)
CHLORIDE BLD-SCNC: 107 MMOL/L (ref 98–107)
CO2: 21 MMOL/L (ref 20–31)
CREAT SERPL-MCNC: 0.4 MG/DL (ref 0.7–1.2)
DIFFERENTIAL TYPE: ABNORMAL
EOSINOPHILS RELATIVE PERCENT: 4 % (ref 1–4)
GFR AFRICAN AMERICAN: >60 ML/MIN
GFR NON-AFRICAN AMERICAN: >60 ML/MIN
GFR SERPL CREATININE-BSD FRML MDRD: ABNORMAL ML/MIN/{1.73_M2}
GFR SERPL CREATININE-BSD FRML MDRD: ABNORMAL ML/MIN/{1.73_M2}
GLUCOSE BLD-MCNC: 91 MG/DL (ref 70–99)
HCT VFR BLD CALC: 28.3 % (ref 40.7–50.3)
HEMOGLOBIN: 8.6 G/DL (ref 13–17)
IMMATURE GRANULOCYTES: 0 %
LYMPHOCYTES # BLD: 16 % (ref 24–44)
MAGNESIUM: 1.6 MG/DL (ref 1.6–2.6)
MCH RBC QN AUTO: 29.7 PG (ref 25.2–33.5)
MCHC RBC AUTO-ENTMCNC: 30.4 G/DL (ref 28.4–34.8)
MCV RBC AUTO: 97.6 FL (ref 82.6–102.9)
MONOCYTES # BLD: 4 % (ref 1–7)
MORPHOLOGY: ABNORMAL
NRBC AUTOMATED: 0 PER 100 WBC
PDW BLD-RTO: 14.6 % (ref 11.8–14.4)
PLATELET # BLD: 123 K/UL (ref 138–453)
PLATELET ESTIMATE: ABNORMAL
PMV BLD AUTO: 9.5 FL (ref 8.1–13.5)
POTASSIUM SERPL-SCNC: 3.2 MMOL/L (ref 3.7–5.3)
RBC # BLD: 2.9 M/UL (ref 4.21–5.77)
RBC # BLD: ABNORMAL 10*6/UL
SEG NEUTROPHILS: 76 % (ref 36–66)
SEGMENTED NEUTROPHILS ABSOLUTE COUNT: 3.88 K/UL (ref 1.8–7.7)
SODIUM BLD-SCNC: 139 MMOL/L (ref 135–144)
TOTAL PROTEIN: 4.9 G/DL (ref 6.4–8.3)
WBC # BLD: 5.1 K/UL (ref 3.5–11.3)
WBC # BLD: ABNORMAL 10*3/UL

## 2018-08-17 PROCEDURE — 6370000000 HC RX 637 (ALT 250 FOR IP): Performed by: STUDENT IN AN ORGANIZED HEALTH CARE EDUCATION/TRAINING PROGRAM

## 2018-08-17 PROCEDURE — 97110 THERAPEUTIC EXERCISES: CPT

## 2018-08-17 PROCEDURE — 1200000000 HC SEMI PRIVATE

## 2018-08-17 PROCEDURE — 94640 AIRWAY INHALATION TREATMENT: CPT

## 2018-08-17 PROCEDURE — 6360000002 HC RX W HCPCS: Performed by: INTERNAL MEDICINE

## 2018-08-17 PROCEDURE — 97116 GAIT TRAINING THERAPY: CPT

## 2018-08-17 PROCEDURE — 80053 COMPREHEN METABOLIC PANEL: CPT

## 2018-08-17 PROCEDURE — 83735 ASSAY OF MAGNESIUM: CPT

## 2018-08-17 PROCEDURE — 99232 SBSQ HOSP IP/OBS MODERATE 35: CPT | Performed by: INTERNAL MEDICINE

## 2018-08-17 PROCEDURE — 99233 SBSQ HOSP IP/OBS HIGH 50: CPT | Performed by: INTERNAL MEDICINE

## 2018-08-17 PROCEDURE — 94762 N-INVAS EAR/PLS OXIMTRY CONT: CPT

## 2018-08-17 PROCEDURE — 94660 CPAP INITIATION&MGMT: CPT

## 2018-08-17 PROCEDURE — 6370000000 HC RX 637 (ALT 250 FOR IP): Performed by: NURSE PRACTITIONER

## 2018-08-17 PROCEDURE — 90670 PCV13 VACCINE IM: CPT | Performed by: INTERNAL MEDICINE

## 2018-08-17 PROCEDURE — G0009 ADMIN PNEUMOCOCCAL VACCINE: HCPCS | Performed by: INTERNAL MEDICINE

## 2018-08-17 PROCEDURE — 36415 COLL VENOUS BLD VENIPUNCTURE: CPT

## 2018-08-17 PROCEDURE — 85025 COMPLETE CBC W/AUTO DIFF WBC: CPT

## 2018-08-17 RX ADMIN — ALBUTEROL SULFATE 2 MG: 2 TABLET ORAL at 08:44

## 2018-08-17 RX ADMIN — THERA TABS 1 TABLET: TAB at 08:39

## 2018-08-17 RX ADMIN — SUCRALFATE 1 G: 1 TABLET ORAL at 08:39

## 2018-08-17 RX ADMIN — POTASSIUM CHLORIDE 40 MEQ: 1500 TABLET, EXTENDED RELEASE ORAL at 08:39

## 2018-08-17 RX ADMIN — IPRATROPIUM BROMIDE AND ALBUTEROL SULFATE 3 ML: .5; 3 SOLUTION RESPIRATORY (INHALATION) at 07:51

## 2018-08-17 RX ADMIN — IPRATROPIUM BROMIDE AND ALBUTEROL SULFATE 3 ML: .5; 3 SOLUTION RESPIRATORY (INHALATION) at 19:35

## 2018-08-17 RX ADMIN — CETIRIZINE HYDROCHLORIDE 10 MG: 10 TABLET ORAL at 08:40

## 2018-08-17 RX ADMIN — PNEUMOCOCCAL 13-VALENT CONJUGATE VACCINE 0.5 ML: 2.2; 2.2; 2.2; 2.2; 2.2; 4.4; 2.2; 2.2; 2.2; 2.2; 2.2; 2.2; 2.2 INJECTION, SUSPENSION INTRAMUSCULAR at 16:53

## 2018-08-17 RX ADMIN — IPRATROPIUM BROMIDE AND ALBUTEROL SULFATE 3 ML: .5; 3 SOLUTION RESPIRATORY (INHALATION) at 11:51

## 2018-08-17 RX ADMIN — ALBUTEROL SULFATE 2 MG: 2 TABLET ORAL at 22:46

## 2018-08-17 RX ADMIN — PANTOPRAZOLE SODIUM 40 MG: 40 TABLET, DELAYED RELEASE ORAL at 08:40

## 2018-08-17 RX ADMIN — MICONAZOLE NITRATE: 20.6 POWDER TOPICAL at 22:45

## 2018-08-17 RX ADMIN — SUCRALFATE 1 G: 1 TABLET ORAL at 22:46

## 2018-08-17 RX ADMIN — IPRATROPIUM BROMIDE AND ALBUTEROL SULFATE 3 ML: .5; 3 SOLUTION RESPIRATORY (INHALATION) at 15:53

## 2018-08-17 RX ADMIN — SUCRALFATE 1 G: 1 TABLET ORAL at 16:54

## 2018-08-17 RX ADMIN — GABAPENTIN 300 MG: 300 CAPSULE ORAL at 22:46

## 2018-08-17 RX ADMIN — MICONAZOLE NITRATE: 20.6 POWDER TOPICAL at 08:40

## 2018-08-17 RX ADMIN — SUCRALFATE 1 G: 1 TABLET ORAL at 12:07

## 2018-08-17 RX ADMIN — RIVAROXABAN 20 MG: 20 TABLET, FILM COATED ORAL at 08:40

## 2018-08-17 RX ADMIN — POLYETHYLENE GLYCOL 3350 17 G: 17 POWDER, FOR SOLUTION ORAL at 08:39

## 2018-08-17 RX ADMIN — MOMETASONE FUROATE AND FORMOTEROL FUMARATE DIHYDRATE 2 PUFF: 200; 5 AEROSOL RESPIRATORY (INHALATION) at 19:36

## 2018-08-17 RX ADMIN — MOMETASONE FUROATE AND FORMOTEROL FUMARATE DIHYDRATE 2 PUFF: 200; 5 AEROSOL RESPIRATORY (INHALATION) at 07:51

## 2018-08-17 RX ADMIN — GABAPENTIN 300 MG: 300 CAPSULE ORAL at 08:39

## 2018-08-17 ASSESSMENT — PAIN DESCRIPTION - PROGRESSION: CLINICAL_PROGRESSION: GRADUALLY IMPROVING

## 2018-08-17 NOTE — PLAN OF CARE
Problem: Falls - Risk of:  Goal: Will remain free from falls  Will remain free from falls   Outcome: Met This Shift  Patient remained free from falls this shift. Bed locked and in lowest position, call light within reach, and bed alarm on. Problem: Risk for Impaired Skin Integrity  Goal: Tissue integrity - skin and mucous membranes  Structural intactness and normal physiological function of skin and  mucous membranes. Outcome: Met This Shift  No new skin breakdown during this shift. Will continue to monitor.

## 2018-08-17 NOTE — PLAN OF CARE
Problem: RESPIRATORY  Intervention: Respiratory assessment  BRONCHOSPASM/BRONCHOCONSTRICTION     [x]         IMPROVE AERATION/BREATH SOUNDS  [x]   ADMINISTER BRONCHODILATOR THERAPY AS APPROPRIATE  [x]   ASSESS BREATH SOUNDS  [x]   IMPLEMENT AEROSOL/MDI PROTOCOL  [x]   PATIENT EDUCATION AS NEEDED     NONINVASIVE VENTILATION    PROVIDE OPTIMAL VENTILATION/ACCEPTABLE SPO2   IMPLEMENT NONINVASIVE VENTILATION PROTOCOL   MAINTAIN ACCEPTABLE SPO2   ASSESS SKIN INTEGRITY/BREAKDOWN SCORE   PATIENT EDUCATION AS NEEDED   BIPAP AS NEEDED

## 2018-08-17 NOTE — PROGRESS NOTES
Stand by assistance  Ambulation  Ambulation?: Yes  Ambulation 1  Surface: level tile  Device: Rolling Walker  Other Apparatus:  (shoes w/ AFO R )  Assistance: Contact guard assistance  Quality of Gait: slightly flexed posture, L foot ext. rotated, slightly unequal stride L  Distance: 60' x 2  Comments: slightly fatique after amb. Stairs/Curb  Stairs?: No  Balance  Posture: Good  Sitting - Static: Good  Sitting - Dynamic: Good  Standing - Static: Fair;+  Standing - Dynamic: Fair  Comments: standing balance assessed w/RW  Exercises  Hip Flexion: x 10  Hip Abduction: x 10  Knee Long Arc Quad: x 12  Ankle Pumps: L x 15  Core Strengthening: EOB 3 min. Assessment   Body structures, Functions, Activity limitations: Decreased functional mobility ; Decreased strength;Decreased ROM; Decreased endurance;Decreased balance;Decreased ADL status  Assessment: Pt. is able to egress to EOB/stand SBA, amb. RW w/ shoes AFO R on 60' x 2 CGA.  HOME w/PT  Prognosis: Good  Decision Making: Low Complexity  Patient Education: Pt was educated on the importance of mobility and continuing OP PT once discharged from [address] [place] [address] UP: Yes  Activity Tolerance  Activity Tolerance: Patient Tolerated treatment well;Patient limited by fatigue;Patient limited by endurance   Goals  Short term goals  Time Frame for Short term goals: 14 visits  Short term goal 1: Pt will ambulate independently 450ft with RW  Short term goal 2: Pt will tolerate 30 minutes of activity for endurance  Short term goal 3: Pt will demonstrate good standing balance  Short term goal 4: Pt will ascend/descend 5 stairs with supervision   Short term goal 5: Pt will be independent with bed mobility and transfers   Patient Goals   Patient goals : Pt reports he wants to go home to prior level of function    Plan    Plan  Times per week: 5 x wk  Times per day: Daily  Current Treatment Recommendations: Strengthening, Transfer Training, Endurance Training, Balance Training, IADL Training, Gait Training, Functional Mobility Training, Stair training, ROM, ADL/Self-care Training, Home Exercise Program, Patient/Caregiver Education & Training  Safety Devices  Type of devices: Left in chair, Call light within reach, Nurse notified, Gait belt, Patient at risk for falls  Restraints  Initially in place: No     Therapy Time   Individual Concurrent Group Co-treatment   Time In 0925         Time Out 0954         Minutes 28 [facility]

## 2018-08-17 NOTE — PROGRESS NOTES
THE [facility] AT [facility] Progress Note    Cristina Cervantes is a 71 y.o. male patient. Hospitalization Day:4      Chief consult reason:   Post-surgical narrowing of gastric pouch luminal outlet   Subjective:  Pt seen and examined. Pt is tolerating liquid diet without any nausea or vomiting. Pt only c/o slight soreness in the upper abdomen but not actual pain. Pt had EGD yesterday per Dr. Kierra Suh that indicated solidified contrast from previous esophagram and large food debri in the apex of his pouch. The gastric outlet was dilated. Pt tolerated procedure. VITALS:  /61   Pulse 86   Temp 98 °F (36.7 °C) (Oral)   Resp 18   Ht 5' 10\" (1.778 m)   Wt 256 lb 9.9 oz (116.4 kg)   SpO2 99%   BMI 36.82 kg/m²   TEMPERATURE:  Current - Temp: 98 °F (36.7 °C); Max - Temp  Av.5 °F (36.4 °C)  Min: 96.8 °F (36 °C)  Max: 99.7 °F (37.6 °C)    Physical Assessment:  General appearance:  alert, cooperative and no distress  Mental Status:  oriented to person, place and time and normal affect  Lungs:  clear to auscultation bilaterally, normal effort  Heart:  regular rate and rhythm, no murmur  Abdomen:  soft, nontender, nondistended, normal bowel sounds, no masses, hepatomegaly, splenomegaly  Extremities:  no edema, redness, tenderness in the calves  Skin:  no gross lesions, rashes, induration    Data Review:  LABS and IMAGING:     CBC  Recent Labs      08/15/18   0615  18   0605  18   0653   WBC  5.9  5.9  5.1   HGB  10.5*  9.9*  8.6*   MCV  95.5  97.4  97.6   RDW  14.7*  14.6*  14.6*   PLT  185  149  123*       ANEMIA STUDIES  No results for input(s): LABIRON, TIBC, FERRITIN, [id number], FOLATE, OCCULTBLD in the last 72 hours.     BMP  Recent Labs      08/15/18   0615  18   0605  18   0653   NA  139  134*  139   K  4.8  3.8  3.2*   CL  103  102  107   CO2  25  22  21   BUN  9  7*  5*   CREATININE  0.51*  0.53*  0.40*   GLUCOSE  104*  98  91   CALCIUM  8.7  8.3*  7.0*       LFTS  Recent

## 2018-08-17 NOTE — PROGRESS NOTES
[address] [place] [address]     Department of Internal Medicine - Staff Internal Medicine Service     DAILY PROGRESS NOTE       Patient:  Luis Manuel Adam  YOB: 1945  MRN: [id number]     Acct: [de-identified]     Admit date: 8/12/2018  Admitting Diagnosis: Acute gastric ulcer with bleeding    Subjective:   Patient seen and examined at bedside. No acute events overnight. Stable vitally. Alert and oriented. EDG Dilation performed yesterday. Tolerating clear liquid diet well.      Objective:   /61   Pulse 86   Temp 98 °F (36.7 °C) (Oral)   Resp 18   Ht 5' 10\" (1.778 m)   Wt 256 lb 9.9 oz (116.4 kg)   SpO2 99%   BMI 36.82 kg/m²       General appearance - alert, well appearing, and in no distress  Chest - clear to auscultation, no wheezes, rales or rhonchi, symmetric air entry  Heart - normal rate, regular rhythm, normal S1, S2, no murmurs, rubs, clicks or gallops  Abdomen - soft, nontender, nondistended, no masses or organomegaly  Neurological - alert, oriented, normal speech, no focal findings or movement disorder noted  Musculoskeletal - no joint tenderness, deformity or swelling  Extremities - peripheral pulses normal, no pedal edema, no clubbing or cyanosis  Skin - normal coloration and turgor, no rashes, no suspicious skin lesions noted      Intake/Output Summary (Last 24 hours) at 08/17/18 1031  Last data filed at 08/17/18 0517   Gross per 24 hour   Intake             2395 ml   Output              300 ml   Net             2095 ml         Medications:      ipratropium-albuterol  1 vial Inhalation 4x daily    rivaroxaban  20 mg Oral Daily with breakfast    sodium chloride flush  10 mL Intravenous 2 times per day    albuterol  2 mg Oral BID    cetirizine  10 mg Oral Daily    ciclopirox   Topical Nightly    gabapentin  300 mg Oral BID    multivitamin  1 tablet Oral Daily    polyethylene glycol  17 g Oral Daily    sucralfate  1 g Oral 4x Daily    sodium chloride flush  10 mL Intravenous 2 times per day    Armodafinil  1 tablet Mouth/Throat QAM    mometasone-formoterol  2 puff Inhalation BID    pneumococcal 13-valent conjugate  0.5 mL Intramuscular Once    miconazole   Topical BID    metoclopramide  5 mg Oral 4x Daily AC & HS    pantoprazole  40 mg Oral QAM AC       Diagnostic Labs and Imaging    CBC: Recent Labs      08/15/18   0615  08/16/18   0605  08/17/18   0653   WBC  5.9  5.9  5.1   RBC  3.53*  3.46*  2.90*   HGB  10.5*  9.9*  8.6*   HCT  33.7*  33.7*  28.3*   MCV  95.5  97.4  97.6   RDW  14.7*  14.6*  14.6*   PLT  185  149  123*     BMP: Recent Labs      08/15/18   0615  08/16/18   0605  08/17/18   0653   NA  139  134*  139   K  4.8  3.8  3.2*   CL  103  102  107   CO2  25  22  21   BUN  9  7*  5*   CREATININE  0.51*  0.53*  0.40*     BNP: No results for input(s): BNP in the last 72 hours. PT/INR:   Recent Labs      08/16/18   1256  08/16/18   1618   PROTIME  16.6*  14.2*   INR  1.4  1.4     APTT: No results for input(s): APTT in the last 72 hours. CARDIAC ENZYMES: No results for input(s): CKMB, CKMBINDEX, TROPONINI in the last 72 hours. Invalid input(s): CKTOTAL;3  FASTING LIPID PANEL:  Lab Results   Component Value Date    CHOL 157 07/06/2018    HDL 52 07/06/2018    TRIG 101 07/06/2018     LIVER PROFILE: Recent Labs      08/15/18   0615  08/16/18   0605  08/17/18   0653   AST  27  12  11   ALT  7  5  <5*   BILITOT  1.28*  1.37*  1.12   ALKPHOS  67  67  58      Assessment and Plan:     1. Gastric food bolus impactation post removal by EGD- EGD Dilation yesterday. Will upgrade diet to full liquid and observe. Possibly discharge today. 2. Acute Gastric ulcer bleeding- No active bleeding, on Carafate and Protonix  3. A Fibrillation- Rate controlled, xeralto on hold  4. Acute Blood Loss Anemia- Resolved. Hb Stable at 8.6  5. COPD- Patient on his home inhalers dulera and duoneb, not on oxygen anymore  6. Obstructive sleep apnea- Sinus congestion due to BiPaP.  Continue

## 2018-08-18 LAB
ABSOLUTE EOS #: 0.3 K/UL (ref 0–0.44)
ABSOLUTE IMMATURE GRANULOCYTE: <0.03 K/UL (ref 0–0.3)
ABSOLUTE LYMPH #: 0.87 K/UL (ref 1.1–3.7)
ABSOLUTE MONO #: 0.58 K/UL (ref 0.1–1.2)
ALBUMIN SERPL-MCNC: 2.9 G/DL (ref 3.5–5.2)
ALBUMIN/GLOBULIN RATIO: 1 (ref 1–2.5)
ALP BLD-CCNC: 76 U/L (ref 40–129)
ALT SERPL-CCNC: 7 U/L (ref 5–41)
ANION GAP SERPL CALCULATED.3IONS-SCNC: 12 MMOL/L (ref 9–17)
AST SERPL-CCNC: 15 U/L
BASOPHILS # BLD: 1 % (ref 0–2)
BASOPHILS ABSOLUTE: 0.03 K/UL (ref 0–0.2)
BILIRUB SERPL-MCNC: 1.07 MG/DL (ref 0.3–1.2)
BUN BLDV-MCNC: 5 MG/DL (ref 8–23)
BUN/CREAT BLD: ABNORMAL (ref 9–20)
CALCIUM SERPL-MCNC: 8.4 MG/DL (ref 8.6–10.4)
CHLORIDE BLD-SCNC: 104 MMOL/L (ref 98–107)
CO2: 22 MMOL/L (ref 20–31)
CREAT SERPL-MCNC: 0.46 MG/DL (ref 0.7–1.2)
DIFFERENTIAL TYPE: ABNORMAL
EOSINOPHILS RELATIVE PERCENT: 6 % (ref 1–4)
GFR AFRICAN AMERICAN: >60 ML/MIN
GFR NON-AFRICAN AMERICAN: >60 ML/MIN
GFR SERPL CREATININE-BSD FRML MDRD: ABNORMAL ML/MIN/{1.73_M2}
GFR SERPL CREATININE-BSD FRML MDRD: ABNORMAL ML/MIN/{1.73_M2}
GLUCOSE BLD-MCNC: 105 MG/DL (ref 70–99)
HCT VFR BLD CALC: 32.4 % (ref 40.7–50.3)
HEMOGLOBIN: 10 G/DL (ref 13–17)
IMMATURE GRANULOCYTES: 0 %
LYMPHOCYTES # BLD: 17 % (ref 24–43)
MCH RBC QN AUTO: 29.5 PG (ref 25.2–33.5)
MCHC RBC AUTO-ENTMCNC: 30.9 G/DL (ref 28.4–34.8)
MCV RBC AUTO: 95.6 FL (ref 82.6–102.9)
MONOCYTES # BLD: 12 % (ref 3–12)
NRBC AUTOMATED: 0 PER 100 WBC
PDW BLD-RTO: 14.9 % (ref 11.8–14.4)
PLATELET # BLD: 168 K/UL (ref 138–453)
PLATELET ESTIMATE: ABNORMAL
PMV BLD AUTO: 9.1 FL (ref 8.1–13.5)
POTASSIUM SERPL-SCNC: 4.1 MMOL/L (ref 3.7–5.3)
RBC # BLD: 3.39 M/UL (ref 4.21–5.77)
RBC # BLD: ABNORMAL 10*6/UL
SEG NEUTROPHILS: 64 % (ref 36–65)
SEGMENTED NEUTROPHILS ABSOLUTE COUNT: 3.25 K/UL (ref 1.5–8.1)
SODIUM BLD-SCNC: 138 MMOL/L (ref 135–144)
TOTAL PROTEIN: 5.9 G/DL (ref 6.4–8.3)
WBC # BLD: 5.1 K/UL (ref 3.5–11.3)
WBC # BLD: ABNORMAL 10*3/UL

## 2018-08-18 PROCEDURE — 1200000000 HC SEMI PRIVATE

## 2018-08-18 PROCEDURE — 51798 US URINE CAPACITY MEASURE: CPT

## 2018-08-18 PROCEDURE — 2500000003 HC RX 250 WO HCPCS: Performed by: STUDENT IN AN ORGANIZED HEALTH CARE EDUCATION/TRAINING PROGRAM

## 2018-08-18 PROCEDURE — 36415 COLL VENOUS BLD VENIPUNCTURE: CPT

## 2018-08-18 PROCEDURE — 2580000003 HC RX 258: Performed by: STUDENT IN AN ORGANIZED HEALTH CARE EDUCATION/TRAINING PROGRAM

## 2018-08-18 PROCEDURE — 6370000000 HC RX 637 (ALT 250 FOR IP): Performed by: STUDENT IN AN ORGANIZED HEALTH CARE EDUCATION/TRAINING PROGRAM

## 2018-08-18 PROCEDURE — 94660 CPAP INITIATION&MGMT: CPT

## 2018-08-18 PROCEDURE — 97110 THERAPEUTIC EXERCISES: CPT

## 2018-08-18 PROCEDURE — 97116 GAIT TRAINING THERAPY: CPT

## 2018-08-18 PROCEDURE — 99232 SBSQ HOSP IP/OBS MODERATE 35: CPT | Performed by: INTERNAL MEDICINE

## 2018-08-18 PROCEDURE — 85025 COMPLETE CBC W/AUTO DIFF WBC: CPT

## 2018-08-18 PROCEDURE — 94640 AIRWAY INHALATION TREATMENT: CPT

## 2018-08-18 PROCEDURE — 6370000000 HC RX 637 (ALT 250 FOR IP): Performed by: NURSE PRACTITIONER

## 2018-08-18 PROCEDURE — 80053 COMPREHEN METABOLIC PANEL: CPT

## 2018-08-18 RX ORDER — BUMETANIDE 1 MG/1
1 TABLET ORAL 2 TIMES DAILY
Status: DISCONTINUED | OUTPATIENT
Start: 2018-08-18 | End: 2018-08-19 | Stop reason: HOSPADM

## 2018-08-18 RX ORDER — BUMETANIDE 0.25 MG/ML
1 INJECTION, SOLUTION INTRAMUSCULAR; INTRAVENOUS ONCE
Status: COMPLETED | OUTPATIENT
Start: 2018-08-18 | End: 2018-08-18

## 2018-08-18 RX ORDER — ACETAMINOPHEN 325 MG/1
650 TABLET ORAL EVERY 4 HOURS PRN
Status: DISCONTINUED | OUTPATIENT
Start: 2018-08-18 | End: 2018-08-19 | Stop reason: HOSPADM

## 2018-08-18 RX ADMIN — ALBUTEROL SULFATE 2 MG: 2 TABLET ORAL at 20:48

## 2018-08-18 RX ADMIN — THERA TABS 1 TABLET: TAB at 08:55

## 2018-08-18 RX ADMIN — CETIRIZINE HYDROCHLORIDE 10 MG: 10 TABLET ORAL at 08:55

## 2018-08-18 RX ADMIN — SUCRALFATE 1 G: 1 TABLET ORAL at 17:16

## 2018-08-18 RX ADMIN — CYCLOBENZAPRINE 10 MG: 10 TABLET, FILM COATED ORAL at 05:20

## 2018-08-18 RX ADMIN — IPRATROPIUM BROMIDE AND ALBUTEROL SULFATE 3 ML: .5; 3 SOLUTION RESPIRATORY (INHALATION) at 16:15

## 2018-08-18 RX ADMIN — SUCRALFATE 1 G: 1 TABLET ORAL at 08:55

## 2018-08-18 RX ADMIN — CYCLOBENZAPRINE 10 MG: 10 TABLET, FILM COATED ORAL at 20:58

## 2018-08-18 RX ADMIN — ACETAMINOPHEN 650 MG: 325 TABLET ORAL at 06:18

## 2018-08-18 RX ADMIN — ALBUTEROL SULFATE 2 MG: 2 TABLET ORAL at 08:55

## 2018-08-18 RX ADMIN — Medication 10 ML: at 20:48

## 2018-08-18 RX ADMIN — PANTOPRAZOLE SODIUM 40 MG: 40 TABLET, DELAYED RELEASE ORAL at 06:19

## 2018-08-18 RX ADMIN — SUCRALFATE 1 G: 1 TABLET ORAL at 20:48

## 2018-08-18 RX ADMIN — Medication 10 ML: at 11:15

## 2018-08-18 RX ADMIN — METOPROLOL TARTRATE 12.5 MG: 25 TABLET ORAL at 20:49

## 2018-08-18 RX ADMIN — POLYETHYLENE GLYCOL 3350 17 G: 17 POWDER, FOR SOLUTION ORAL at 08:55

## 2018-08-18 RX ADMIN — IPRATROPIUM BROMIDE AND ALBUTEROL SULFATE 3 ML: .5; 3 SOLUTION RESPIRATORY (INHALATION) at 09:01

## 2018-08-18 RX ADMIN — MICONAZOLE NITRATE: 20.6 POWDER TOPICAL at 20:50

## 2018-08-18 RX ADMIN — BUMETANIDE 1 MG: 1 TABLET ORAL at 21:00

## 2018-08-18 RX ADMIN — BUMETANIDE 1 MG: 0.25 INJECTION INTRAMUSCULAR; INTRAVENOUS at 11:15

## 2018-08-18 RX ADMIN — IPRATROPIUM BROMIDE AND ALBUTEROL SULFATE 3 ML: .5; 3 SOLUTION RESPIRATORY (INHALATION) at 20:21

## 2018-08-18 RX ADMIN — SUCRALFATE 1 G: 1 TABLET ORAL at 13:44

## 2018-08-18 RX ADMIN — MOMETASONE FUROATE AND FORMOTEROL FUMARATE DIHYDRATE 2 PUFF: 200; 5 AEROSOL RESPIRATORY (INHALATION) at 20:23

## 2018-08-18 RX ADMIN — GABAPENTIN 300 MG: 300 CAPSULE ORAL at 20:48

## 2018-08-18 RX ADMIN — IPRATROPIUM BROMIDE AND ALBUTEROL SULFATE 3 ML: .5; 3 SOLUTION RESPIRATORY (INHALATION) at 12:10

## 2018-08-18 RX ADMIN — MICONAZOLE NITRATE: 20.6 POWDER TOPICAL at 08:55

## 2018-08-18 RX ADMIN — METOPROLOL TARTRATE 12.5 MG: 25 TABLET ORAL at 15:23

## 2018-08-18 RX ADMIN — RIVAROXABAN 20 MG: 20 TABLET, FILM COATED ORAL at 08:55

## 2018-08-18 RX ADMIN — ACETAMINOPHEN 650 MG: 325 TABLET ORAL at 20:51

## 2018-08-18 RX ADMIN — GABAPENTIN 300 MG: 300 CAPSULE ORAL at 08:55

## 2018-08-18 RX ADMIN — MOMETASONE FUROATE AND FORMOTEROL FUMARATE DIHYDRATE 2 PUFF: 200; 5 AEROSOL RESPIRATORY (INHALATION) at 09:03

## 2018-08-18 ASSESSMENT — PAIN SCALES - GENERAL
PAINLEVEL_OUTOF10: 6
PAINLEVEL_OUTOF10: 3

## 2018-08-18 NOTE — PLAN OF CARE
Problem: Falls - Risk of:  Goal: Will remain free from falls  Will remain free from falls   Outcome: Ongoing  Pt remains free from falls at this time. Floor free from obstacles, and bed is locked and in lowest position. Adequate lighting provided. Call light within reach; pt encouraged to call before getting OOB for any need. Patient is able to easily make needs known, calls out appropriately, and makes no attempt to exit bed unassisted at this time. Will continue to monitor needs during hourly rounding. Goal: Absence of physical injury  Absence of physical injury   Outcome: Ongoing  Patient remains free from physical injury at this time. Safe environment maintained, fall precautions in place. Will monitor. Problem: Pain:  Goal: Pain level will decrease  Pain level will decrease   Outcome: Ongoing  Pt's pain assessed frequently with hourly rounding. Patient denies pain at this time. Will monitor. Goal: Control of acute pain  Control of acute pain   Outcome: Ongoing  Pt's pain assessed frequently with hourly rounding. Patient denies pain at this time. Will monitor. Goal: Control of chronic pain  Control of chronic pain   Outcome: Ongoing  Pt's pain assessed frequently with hourly rounding. Patient denies pain at this time. Will monitor. Problem: Risk for Impaired Skin Integrity  Goal: Tissue integrity - skin and mucous membranes  Structural intactness and normal physiological function of skin and  mucous membranes. Outcome: Ongoing  Full skin assessment completed this shift. No new skin breakdown at this time. Pt turns easily per self, witnessed by writer to do so frequently. Pt kept clean and dry. Isoflex advanced gel surface bed in use. Will continue to monitor.

## 2018-08-18 NOTE — PROGRESS NOTES
[address] [place] [address]     Department of Internal Medicine - Staff Internal Medicine Service     DAILY PROGRESS NOTE       Patient:  Josephine Florez  YOB: 1945  MRN: [id number]     Acct: [de-identified]     Admit date: 8/12/2018  Admitting Diagnosis: Acute gastric ulcer with bleeding    Subjective:   Patient seen and examined at bedside. No acute events overnight. Had EGD Dilation on 8/16/18. Patient has no active complaints. Tolerating the diet well.      Objective:   /76   Pulse 88   Temp 97.5 °F (36.4 °C) (Oral)   Resp 14   Ht 5' 10\" (1.778 m)   Wt 257 lb 15 oz (117 kg)   SpO2 100%   BMI 37.01 kg/m²       General appearance - alert, well appearing, and in no distress  Chest - clear to auscultation, no wheezes, rales or rhonchi, symmetric air entry  Heart - normal rate, regular rhythm, normal S1, S2, no murmurs, rubs, clicks or gallops  Abdomen - soft, nontender, nondistended, no masses or organomegaly  Neurological - alert, oriented, normal speech, no focal findings or movement disorder noted  Musculoskeletal - no joint tenderness, deformity or swelling  Extremities - peripheral pulses normal, no pedal edema, no clubbing or cyanosis  Skin - normal coloration and turgor, no rashes, no suspicious skin lesions noted      Intake/Output Summary (Last 24 hours) at 08/18/18 0656  Last data filed at 08/18/18 0522   Gross per 24 hour   Intake             4648 ml   Output             1250 ml   Net             3398 ml         Medications:      ipratropium-albuterol  1 vial Inhalation 4x daily    rivaroxaban  20 mg Oral Daily with breakfast    sodium chloride flush  10 mL Intravenous 2 times per day    albuterol  2 mg Oral BID    cetirizine  10 mg Oral Daily    ciclopirox   Topical Nightly    gabapentin  300 mg Oral BID    multivitamin  1 tablet Oral Daily    polyethylene glycol  17 g Oral Daily    sucralfate  1 g Oral 4x Daily    sodium chloride flush  10 mL Intravenous 2 times per day    Armodafinil  1 tablet Mouth/Throat QAM    mometasone-formoterol  2 puff Inhalation BID    miconazole   Topical BID    metoclopramide  5 mg Oral 4x Daily AC & HS    pantoprazole  40 mg Oral QAM AC       Diagnostic Labs and Imaging    CBC: Recent Labs      08/16/18   0605  08/17/18   0653  08/18/18   0620   WBC  5.9  5.1  5.1   RBC  3.46*  2.90*  3.39*   HGB  9.9*  8.6*  10.0*   HCT  33.7*  28.3*  32.4*   MCV  97.4  97.6  95.6   RDW  14.6*  14.6*  14.9*   PLT  149  123*  168     BMP: Recent Labs      08/16/18   0605  08/17/18   0653   NA  134*  139   K  3.8  3.2*   CL  102  107   CO2  22  21   BUN  7*  5*   CREATININE  0.53*  0.40*     BNP: No results for input(s): BNP in the last 72 hours. PT/INR:   Recent Labs      08/16/18   1256  08/16/18   1618   PROTIME  16.6*  14.2*   INR  1.4  1.4     FASTING LIPID PANEL:  Lab Results   Component Value Date    CHOL 157 07/06/2018    HDL 52 07/06/2018    TRIG 101 07/06/2018     LIVER PROFILE: Recent Labs      08/16/18   0605  08/17/18   0653   AST  12  11   ALT  5  <5*   BILITOT  1.37*  1.12   ALKPHOS  67  58        Assessment and Plan:     1. Gastric food bolus impactation post removal by EGD- EGD Dilation on 8/16/18. Tolerating full liquid diet. Possibly discharge today. 2. Acute Gastric ulcer bleeding- No active bleeding, on Carafate and Protonix  3. A Fibrillation- Rate controlled, xeralto on hold  4. Acute Blood Loss Anemia- Resolved. Hb Stable at 10  5. COPD- Patient on his home inhalers dulera and duoneb, not on oxygen anymore  6. Obstructive sleep apnea- On CPAP and Oxygen 2L. 7. DVT Prophylaxis- Rafe Crigler, MD  PGY-1, Department of Internal Medicine  [address] [place] [address]  8/18/2018 6:59 AM    Patient tolerating oral diet. Noted to be slightly dyspneic. Also lower extremity edema. lungs b/l crackles b/l  On I/v fluids at 75 cc/hr. Not on home dose of diuretics.  He has h/o chronic diastolic heart failure and atrial fibrillation. He follows up with   Urine output low per RN  Restart Bumex  Stop iv fluids  Monitor weight and I/O's  Restart Xareto  Probably home in am  Continues on puree diet  Attending Physician Statement  I have discussed the care of Radha Cedillo, including pertinent history and exam findings,  with the resident. I have seen and examined the patient and the key elements of all parts of the encounter have been performed by me. I agree with the assessment, plan and orders as documented by the resident.    Karina Brown MD

## 2018-08-18 NOTE — PROGRESS NOTES
LIPASE, AMMONIA in the last 72 hours. PT/INR  Recent Labs      08/16/18   1256  08/16/18   1618   PROTIME  16.6*  14.2*   INR  1.4  1.4       CEA:  No results for input(s): CEA in the last 72 hours. Ca 125:  No results for input(s):  in the last 72 hours. Ca 19-9:   Invalid input(s):   AFP: No results for input(s): AFP in the last 72 hours. Lactic acid:Invalid input(s): LACTIC ACID   Radiology Review:        Impression:  1. Gastric outlet obstruction dilatation s/p EGD yesterday 8/17/2018    Plan:  1. Soft diet  2. Cont Protonix PO, Carafate 1 gr QID x14 days  3. May restart Xarelto as previously prescribed    GI will s/o. Pt will need to follow up with Dr. Digna Garnett in the office in a week or so. Thank you for allowing me to participate in the care of your patient. Please feel free to contact me with any questions or concerns.      Kristin Ribeiro, [address] [facility]  [phone]

## 2018-08-18 NOTE — PROGRESS NOTES
Physical Therapy  Facility/Department: [address] STEPDOWN  Daily Treatment Note  NAME: Odessa Gipson  : 1945  MRN: [id number]    Date of Service: 2018    Discharge Recommendations:  Home with Home health PT, Home with assist PRN        Patient Diagnosis(es): The encounter diagnosis was Hematemesis with nausea. has a past medical history of Asthma; Atrial fibrillation ([address] GERD (gastroesophageal reflux disease); Hyperlipidemia; Hypertension; Obesity; Osteoarthritis; and Unspecified sleep apnea. has a past surgical history that includes Finger amputation (); Gastric bypass surgery (); Carpal tunnel release; Colonoscopy; Rotator cuff repair; joint replacement ( & ); Hemorrhoid surgery; pr egd transoral biopsy single/multiple (N/A, 2018); Upper gastrointestinal endoscopy (2018); Upper gastrointestinal endoscopy (N/A, 2018); Upper gastrointestinal endoscopy (N/A, 2018); and Upper gastrointestinal endoscopy (2018). Restrictions  Restrictions/Precautions  Restrictions/Precautions: Fall Risk, Cardiac, General Precautions  Required Braces or Orthoses?: Yes  Required Braces or Orthoses  Right Lower Extremity Brace: Ankle Foot Orthotics  Position Activity Restriction  Other position/activity restrictions: up as tolerated   Subjective   General  Chart Reviewed: Yes  Response To Previous Treatment: Patient with no complaints from previous session. Family / Caregiver Present: No  Subjective  Subjective: cooperative, sitting at EOB          Orientation  Orientation  Overall Orientation Status: Within Normal Limits  Objective      Transfers  Sit to Stand: Contact guard assistance  Stand to sit: Stand by assistance  Stand Pivot Transfers: Stand by assistance  Ambulation 1  Surface: level tile  Device: Rolling Walker  Assistance: Contact guard assistance  Quality of Gait: slightly flexed posture, L foot ext.  rotated, slightly unequal stride L; AFO on right

## 2018-08-19 VITALS
DIASTOLIC BLOOD PRESSURE: 80 MMHG | HEART RATE: 70 BPM | HEIGHT: 70 IN | OXYGEN SATURATION: 95 % | WEIGHT: 248.02 LBS | SYSTOLIC BLOOD PRESSURE: 123 MMHG | BODY MASS INDEX: 35.51 KG/M2 | RESPIRATION RATE: 16 BRPM | TEMPERATURE: 98.1 F

## 2018-08-19 LAB
ABSOLUTE EOS #: 0.32 K/UL (ref 0–0.44)
ABSOLUTE IMMATURE GRANULOCYTE: <0.03 K/UL (ref 0–0.3)
ABSOLUTE LYMPH #: 0.85 K/UL (ref 1.1–3.7)
ABSOLUTE MONO #: 0.6 K/UL (ref 0.1–1.2)
ALBUMIN SERPL-MCNC: 2.8 G/DL (ref 3.5–5.2)
ALBUMIN/GLOBULIN RATIO: 0.9 (ref 1–2.5)
ALP BLD-CCNC: 73 U/L (ref 40–129)
ALT SERPL-CCNC: 7 U/L (ref 5–41)
ANION GAP SERPL CALCULATED.3IONS-SCNC: 12 MMOL/L (ref 9–17)
AST SERPL-CCNC: 14 U/L
BASOPHILS # BLD: 0 % (ref 0–2)
BASOPHILS ABSOLUTE: <0.03 K/UL (ref 0–0.2)
BILIRUB SERPL-MCNC: 0.78 MG/DL (ref 0.3–1.2)
BUN BLDV-MCNC: 5 MG/DL (ref 8–23)
BUN/CREAT BLD: ABNORMAL (ref 9–20)
CALCIUM SERPL-MCNC: 8.6 MG/DL (ref 8.6–10.4)
CHLORIDE BLD-SCNC: 105 MMOL/L (ref 98–107)
CO2: 26 MMOL/L (ref 20–31)
CREAT SERPL-MCNC: 0.55 MG/DL (ref 0.7–1.2)
DIFFERENTIAL TYPE: ABNORMAL
EOSINOPHILS RELATIVE PERCENT: 7 % (ref 1–4)
GFR AFRICAN AMERICAN: >60 ML/MIN
GFR NON-AFRICAN AMERICAN: >60 ML/MIN
GFR SERPL CREATININE-BSD FRML MDRD: ABNORMAL ML/MIN/{1.73_M2}
GFR SERPL CREATININE-BSD FRML MDRD: ABNORMAL ML/MIN/{1.73_M2}
GLUCOSE BLD-MCNC: 96 MG/DL (ref 70–99)
HCT VFR BLD CALC: 33.5 % (ref 40.7–50.3)
HEMOGLOBIN: 10.1 G/DL (ref 13–17)
IMMATURE GRANULOCYTES: 0 %
LYMPHOCYTES # BLD: 18 % (ref 24–43)
MCH RBC QN AUTO: 29.4 PG (ref 25.2–33.5)
MCHC RBC AUTO-ENTMCNC: 30.1 G/DL (ref 28.4–34.8)
MCV RBC AUTO: 97.4 FL (ref 82.6–102.9)
MONOCYTES # BLD: 13 % (ref 3–12)
NRBC AUTOMATED: 0 PER 100 WBC
PDW BLD-RTO: 14.8 % (ref 11.8–14.4)
PLATELET # BLD: 200 K/UL (ref 138–453)
PLATELET ESTIMATE: ABNORMAL
PMV BLD AUTO: 9.3 FL (ref 8.1–13.5)
POTASSIUM SERPL-SCNC: 4 MMOL/L (ref 3.7–5.3)
RBC # BLD: 3.44 M/UL (ref 4.21–5.77)
RBC # BLD: ABNORMAL 10*6/UL
SEG NEUTROPHILS: 62 % (ref 36–65)
SEGMENTED NEUTROPHILS ABSOLUTE COUNT: 3 K/UL (ref 1.5–8.1)
SODIUM BLD-SCNC: 143 MMOL/L (ref 135–144)
TOTAL PROTEIN: 5.9 G/DL (ref 6.4–8.3)
WBC # BLD: 4.8 K/UL (ref 3.5–11.3)
WBC # BLD: ABNORMAL 10*3/UL

## 2018-08-19 PROCEDURE — 99232 SBSQ HOSP IP/OBS MODERATE 35: CPT | Performed by: INTERNAL MEDICINE

## 2018-08-19 PROCEDURE — 6370000000 HC RX 637 (ALT 250 FOR IP): Performed by: NURSE PRACTITIONER

## 2018-08-19 PROCEDURE — 6370000000 HC RX 637 (ALT 250 FOR IP): Performed by: STUDENT IN AN ORGANIZED HEALTH CARE EDUCATION/TRAINING PROGRAM

## 2018-08-19 PROCEDURE — 80053 COMPREHEN METABOLIC PANEL: CPT

## 2018-08-19 PROCEDURE — 51798 US URINE CAPACITY MEASURE: CPT

## 2018-08-19 PROCEDURE — 94762 N-INVAS EAR/PLS OXIMTRY CONT: CPT

## 2018-08-19 PROCEDURE — 85025 COMPLETE CBC W/AUTO DIFF WBC: CPT

## 2018-08-19 PROCEDURE — 2580000003 HC RX 258: Performed by: STUDENT IN AN ORGANIZED HEALTH CARE EDUCATION/TRAINING PROGRAM

## 2018-08-19 PROCEDURE — 94640 AIRWAY INHALATION TREATMENT: CPT

## 2018-08-19 PROCEDURE — 36415 COLL VENOUS BLD VENIPUNCTURE: CPT

## 2018-08-19 RX ORDER — METOCLOPRAMIDE HYDROCHLORIDE 5 MG/5ML
5 SOLUTION ORAL
Qty: 750 ML | Refills: 3 | Status: SHIPPED | OUTPATIENT
Start: 2018-08-19 | End: 2018-12-20 | Stop reason: SDUPTHER

## 2018-08-19 RX ORDER — PANTOPRAZOLE SODIUM 40 MG/1
40 TABLET, DELAYED RELEASE ORAL DAILY
Qty: 30 TABLET | Refills: 3 | Status: SHIPPED | OUTPATIENT
Start: 2018-08-19 | End: 2018-09-21 | Stop reason: SDUPTHER

## 2018-08-19 RX ORDER — SUCRALFATE 1 G/1
1 TABLET ORAL 4 TIMES DAILY
Qty: 120 TABLET | Refills: 3 | Status: SHIPPED | OUTPATIENT
Start: 2018-08-19 | End: 2018-11-29 | Stop reason: ALTCHOICE

## 2018-08-19 RX ORDER — BUMETANIDE 1 MG/1
1 TABLET ORAL 2 TIMES DAILY
Qty: 30 TABLET | Refills: 3 | Status: ON HOLD | OUTPATIENT
Start: 2018-08-19 | End: 2018-12-12 | Stop reason: HOSPADM

## 2018-08-19 RX ADMIN — BUMETANIDE 1 MG: 1 TABLET ORAL at 08:40

## 2018-08-19 RX ADMIN — IPRATROPIUM BROMIDE AND ALBUTEROL SULFATE 3 ML: .5; 3 SOLUTION RESPIRATORY (INHALATION) at 16:12

## 2018-08-19 RX ADMIN — GABAPENTIN 300 MG: 300 CAPSULE ORAL at 08:39

## 2018-08-19 RX ADMIN — ALBUTEROL SULFATE 2 MG: 2 TABLET ORAL at 12:00

## 2018-08-19 RX ADMIN — IPRATROPIUM BROMIDE AND ALBUTEROL SULFATE 3 ML: .5; 3 SOLUTION RESPIRATORY (INHALATION) at 12:45

## 2018-08-19 RX ADMIN — CETIRIZINE HYDROCHLORIDE 10 MG: 10 TABLET ORAL at 08:40

## 2018-08-19 RX ADMIN — METOPROLOL TARTRATE 12.5 MG: 25 TABLET ORAL at 08:38

## 2018-08-19 RX ADMIN — Medication 10 ML: at 08:41

## 2018-08-19 RX ADMIN — IPRATROPIUM BROMIDE AND ALBUTEROL SULFATE 3 ML: .5; 3 SOLUTION RESPIRATORY (INHALATION) at 09:04

## 2018-08-19 RX ADMIN — RIVAROXABAN 20 MG: 20 TABLET, FILM COATED ORAL at 08:38

## 2018-08-19 RX ADMIN — SUCRALFATE 1 G: 1 TABLET ORAL at 08:38

## 2018-08-19 RX ADMIN — MICONAZOLE NITRATE: 20.6 POWDER TOPICAL at 08:40

## 2018-08-19 RX ADMIN — MOMETASONE FUROATE AND FORMOTEROL FUMARATE DIHYDRATE 2 PUFF: 200; 5 AEROSOL RESPIRATORY (INHALATION) at 09:04

## 2018-08-19 RX ADMIN — PANTOPRAZOLE SODIUM 40 MG: 40 TABLET, DELAYED RELEASE ORAL at 07:18

## 2018-08-19 RX ADMIN — POLYETHYLENE GLYCOL 3350 17 G: 17 POWDER, FOR SOLUTION ORAL at 08:40

## 2018-08-19 RX ADMIN — THERA TABS 1 TABLET: TAB at 08:38

## 2018-08-19 NOTE — PROGRESS NOTES
Discharge instructions reviewed with pt, discussed medications, denies any further questions or anxiety related to discharge. IV/tele discontinued. Pt discharged home with daughter. Taken from room via wheelchair by Digna Villegas, personal belongings taken with.

## 2018-08-19 NOTE — PROGRESS NOTES
Surgery Progress Note            PATIENT NAME: El Mcdonnell     TODAY'S DATE: 8/19/2018, 7:14 AM    SUBJECTIVE:    Pt  Seen and examined. No issues overnight. Tolerating full liquid diet. OBJECTIVE:   VITALS:  /63   Pulse 81   Temp 97.9 °F (36.6 °C) (Axillary)   Resp 18   Ht 5' 10\" (1.778 m)   Wt 248 lb 0.3 oz (112.5 kg)   SpO2 95%   BMI 35.59 kg/m²      INTAKE/OUTPUT:      Intake/Output Summary (Last 24 hours) at 08/19/18 0714  Last data filed at 08/19/18 0608   Gross per 24 hour   Intake             2858 ml   Output             4050 ml   Net            -1192 ml       PHYSICAL EXAM  General Appearance:  awake, alert, oriented, in no acute distress  Lungs:  Normal expansion. Clear to auscultation. No rales, rhonchi, or wheezing. Heart:  Heart regular rate and rhythm  Abdomen:  Soft, non distended, non tender, no peritoneal signs  Musculoskeletal:  No joint swelling, deformity, or tenderness.          Data:  CBC with Differential:    Lab Results   Component Value Date    WBC 5.1 08/18/2018    RBC 3.39 08/18/2018    RBC 4.39 09/13/2011    HGB 10.0 08/18/2018    HCT 32.4 08/18/2018     08/18/2018     09/13/2011    MCV 95.6 08/18/2018    MCH 29.5 08/18/2018    MCHC 30.9 08/18/2018    RDW 14.9 08/18/2018    LYMPHOPCT 17 08/18/2018    LYMPHOPCT 21.2 10/30/2013    MONOPCT 12 08/18/2018    MONOPCT 7.9 10/30/2013    EOSPCT 7.9 10/30/2013    BASOPCT 1 08/18/2018    BASOPCT 0.7 10/30/2013    MONOSABS 0.58 08/18/2018    MONOSABS 0.5 10/30/2013    LYMPHSABS 0.87 08/18/2018    LYMPHSABS 1.4 10/30/2013    EOSABS 0.30 08/18/2018    EOSABS 0.5 10/30/2013    BASOSABS 0.03 08/18/2018    DIFFTYPE NOT REPORTED 08/18/2018     BMP:    Lab Results   Component Value Date     08/18/2018    K 4.1 08/18/2018     08/18/2018    CO2 22 08/18/2018    BUN 5 08/18/2018    LABALBU 2.9 08/18/2018    CREATININE 0.46 08/18/2018    CALCIUM 8.4 08/18/2018    GFRAA >60 08/18/2018    LABGLOM >60 08/18/2018

## 2018-08-19 NOTE — PLAN OF CARE
Problem: RESPIRATORY  Intervention: Respiratory assessment  BRONCHOSPASM/BRONCHOCONSTRICTION     [x]         IMPROVE AERATION/BREATH SOUNDS  [x]   ADMINISTER BRONCHODILATOR THERAPY AS APPROPRIATE  [x]   ASSESS BREATH SOUNDS  [x]   IMPLEMENT AEROSOL/MDI PROTOCOL  [x]   PATIENT EDUCATION AS NEEDED      Intervention: Continuous positive airway pressure (CPAP)  NON INVASIVE VENTILATION  PROVIDE OPTIMAL VENTILATION/ACCEPTABLE SP02  IMPLEMENT NON INVASIVE VENTILATION PROTOCOL  ASSESSMENT SKIN INTEGRITY  PATIENT EDUCATION AS NEEDED  BIPAP AS NEEDED

## 2018-08-19 NOTE — PROGRESS NOTES
ondansetron (ZOFRAN-ODT) 4 MG disintegrating tablet Take 4 mg by mouth 4/29/18   Historical Provider, MD   ipratropium-albuterol (DUONEB) 0.5-2.5 (3) MG/3ML SOLN nebulizer solution Inhale 1 vial into the lungs every 4 hours    Historical Provider, MD   Armodafinil 200 MG TABS Take 1 tablet by mouth every morning. . 4/2/18   Historical Provider, MD   ciclopirox (PENLAC) 8 % solution Apply topically nightly to nails 12/1/17   Dydiamante Oar, MD   SYMBICORT 160-4.5 MCG/ACT AERO  10/9/17   Historical Provider, MD   albuterol (PROVENTIL) 2 MG tablet TAKE 1 TABLET BY MOUTH 3 TIMES A DAY 7/27/17   Historical Provider, MD   albuterol (PROVENTIL HFA;VENTOLIN HFA) 108 (90 BASE) MCG/ACT inhaler Inhale 2 puffs into the lungs every 6 hours as needed for Wheezing.     Historical Provider, MD   .  Recent Surgical History: None = 0     Assessment     Peak Flow (asthma only)    Predicted: 496  Personal Best: NA  PEF   % Predicted   Peak Flow :     FEV1/FVC    FEV1 Predicted 3.12      FEV1 NA    FEV1 % Predicted   FVC   IS volume   IBW 73 kg    RR 16  Breath Sounds: clear      · Bronchodilator assessment at level  0  · Hyperinflation assessment at level   · Secretion Management assessment at level    ·   · [x]    Bronchodilator Assessment  BRONCHODILATOR ASSESSMENT SCORE  Score 0 1 2 3 4 5   Breath Sounds   [x]  Patient Baseline []  No Wheeze good aeration []  Faint, scattered wheezing, good aeration []  Expiratory Wheezing and or moderately diminished []  Insp/Exp wheeze and/or very diminished []  Insp/Exp and/ or marked distress   Respiratory Rate   [x]  Patient Baseline []  Less than 20 []  Less than 20 []  20-25 []  Greater than 25 []  Greater than 25   Peak flow % of Pred or PB [x]  NA   []  Greater than 90%  []  81-90% []  71-80% []  Less than or equal to 70%  or unable to perform []  Unable due to Respiratory Distress   Dyspnea re [x]  Patient Baseline []  No SOB []  No SOB []  SOB on exertion []  SOB min activity []  At

## 2018-08-19 NOTE — PROGRESS NOTES
[address] [place] [address]     Department of Internal Medicine - Staff Internal Medicine Service     DAILY PROGRESS NOTE       Patient:  Olga Condon  YOB: 1945  MRN: [id number]     Acct: [de-identified]     Admit date: 8/12/2018  Admitting Diagnosis: Acute gastric ulcer with bleeding    Subjective:   Patient seen and examined at bedside. No acute events overnight. Stable hemodynamics. Alert and oriented. Tolerating soft for without any nausea and vomiting. Patient had 4 L of urine output since the last 4 hours.       Objective:   /63   Pulse 81   Temp 97.9 °F (36.6 °C) (Axillary)   Resp 18   Ht 5' 10\" (1.778 m)   Wt 248 lb 0.3 oz (112.5 kg)   SpO2 95%   BMI 35.59 kg/m²       General appearance - alert, well appearing, and in no distress  Chest - clear to auscultation, no wheezes, rales or rhonchi, symmetric air entry  Heart - normal rate, regular rhythm, normal S1, S2, no murmurs, rubs, clicks or gallops  Abdomen - soft, nontender, nondistended, no masses or organomegaly  Neurological - alert, oriented, normal speech, no focal findings or movement disorder noted  Musculoskeletal - no joint tenderness, deformity or swelling  Extremities - peripheral pulses normal, no pedal edema, no clubbing or cyanosis  Skin - normal coloration and turgor, no rashes, no suspicious skin lesions noted      Intake/Output Summary (Last 24 hours) at 08/19/18 0629  Last data filed at 08/19/18 6382   Gross per 24 hour   Intake             2858 ml   Output             4050 ml   Net            -1192 ml         Medications:      bumetanide  1 mg Oral BID    metoprolol tartrate  12.5 mg Oral BID    ipratropium-albuterol  1 vial Inhalation 4x daily    rivaroxaban  20 mg Oral Daily with breakfast    sodium chloride flush  10 mL Intravenous 2 times per day    albuterol  2 mg Oral BID    cetirizine  10 mg Oral Daily    ciclopirox   Topical Nightly    gabapentin  300 mg Oral BID    multivitamin  [address] [place], [place]  8/19/2018 6:30 AM    Attending Physician Statement  I have discussed the care of Don Mccloud, including pertinent history and exam findings,  with the resident. I have seen and examined the patient and the key elements of all parts of the encounter have been performed by me. I agree with the assessment, plan and orders as documented by the resident.     Karina Brown MD

## 2018-08-19 NOTE — PROGRESS NOTES
Nutrition Education    Type and Reason for Visit:  Full liquid diet      · Verbally reviewed following information with Patient: Full liquid diet  · Written educational materials provided. · Refer to Patient Education activity for more details.     Electronically signed by Paul Barrientos RD, LD on 8/19/18 at 1:28 PM    Contact Number: [phone]

## 2018-08-20 ENCOUNTER — CARE COORDINATION (OUTPATIENT)
Dept: CASE MANAGEMENT | Age: 73
End: 2018-08-20

## 2018-08-20 DIAGNOSIS — K25.0 ACUTE GASTRIC ULCER WITH BLEEDING: ICD-10-CM

## 2018-08-20 DIAGNOSIS — K92.0 HEMATEMESIS WITH NAUSEA: Primary | ICD-10-CM

## 2018-08-20 PROCEDURE — 1111F DSCHRG MED/CURRENT MED MERGE: CPT | Performed by: FAMILY MEDICINE

## 2018-08-20 NOTE — DISCHARGE SUMMARY
home    Patient Instructions:   Discharge Medication List as of 8/19/2018  4:52 PM      START taking these medications    Details   metoclopramide (REGLAN) 5 MG/5ML solution Take 5 mLs by mouth 4 times daily (before meals and nightly), Disp-750 mL, R-3Normal         CONTINUE these medications which have CHANGED    Details   bumetanide (BUMEX) 1 MG tablet Take 1 tablet by mouth 2 times daily, Disp-30 tablet, R-3Normal      sucralfate (CARAFATE) 1 GM tablet Take 1 tablet by mouth 4 times daily, Disp-120 tablet, R-3Normal      pantoprazole (PROTONIX) 40 MG tablet Take 1 tablet by mouth daily, Disp-30 tablet, R-3Normal         CONTINUE these medications which have NOT CHANGED    Details   Incontinence Supply Disposable (INCONTINENCE BRIEF LARGE) MISC Disp-90 each, R-3, NormalTo use as directed.  Use 3x a day      polyethylene glycol (GLYCOLAX) powder Take 17 g by mouth daily, Disp-527 g, R-3Normal      rivaroxaban (XARELTO) 20 MG TABS tablet Take 1 tablet by mouth daily (with breakfast), Disp-30 tablet, R-2Normal      potassium chloride (KLOR-CON M20) 20 MEQ extended release tablet TAKE 3 TABLETS BY MOUTH TWICE A DAY, Disp-360 tablet, R-190 DAY SUPPLYNormal      gabapentin (NEURONTIN) 300 MG capsule TAKE ONE CAPSULE BY MOUTH 3 TIMES A DAY., Disp-270 capsule, R-1Normal      cetirizine (ZYRTEC) 10 MG tablet Take 1 tablet by mouth daily, Disp-30 tablet, R-2Normal      cyclobenzaprine (FLEXERIL) 10 MG tablet Take 1 tablet by mouth nightly as needed for Muscle spasms, Disp-30 tablet, R-2Normal      spironolactone (ALDACTONE) 25 MG tablet Take 1 tablet by mouth daily, Disp-30 tablet, R-2Normal      albuterol (PROVENTIL) (2.5 MG/3ML) 0.083% nebulizer solution Take 3 mLs by nebulization every 4 hours as needed for Wheezing, Disp-60 each, R-3Normal      Multiple Vitamin (MULTIVITAMIN) tablet Take 1 tablet by mouth daily, Disp-30 tablet, R-2Normal      metoprolol succinate (TOPROL XL) 50 MG extended release tablet 1 tablet daily, Disp-30 tablet, R-3Historical Med      ondansetron (ZOFRAN-ODT) 4 MG disintegrating tablet Take 4 mg by mouthHistorical Med      ipratropium-albuterol (DUONEB) 0.5-2.5 (3) MG/3ML SOLN nebulizer solution Inhale 1 vial into the lungs every 4 hoursHistorical Med      Armodafinil 200 MG TABS Take 1 tablet by mouth every morning. Mordecai Dade Historical Med      ciclopirox (PENLAC) 8 % solution Apply topically nightly to nails, Disp-6 mL, R-11, Normal      SYMBICORT 160-4.5 MCG/ACT AERO DAWHistorical Med      albuterol (PROVENTIL) 2 MG tablet TAKE 1 TABLET BY MOUTH 3 TIMES A DAY, R-6Historical Med      albuterol (PROVENTIL HFA;VENTOLIN HFA) 108 (90 BASE) MCG/ACT inhaler Inhale 2 puffs into the lungs every 6 hours as needed for Wheezing.              Activity: activity as tolerated    Diet: regular diet    Follow-up:    Da Cagle MD  [facility], [address] [address]  [address] [address] [address], [address] [facility] [address] [phone]          Akira Tavarez [address] [place] [address]  [phone]    Call      Erlinda Olivo DO  [address] [address]  [phone]    In 1 week        Follow up labs: None    Follow up imaging: None    Note that over 30 minutes was spent in preparing discharge papers, discussing discharge with patient, medication review, etc.      Vinod Ford MD         Department of Internal Medicine  [facility]         8/20/2018, 1:07 PM

## 2018-08-20 NOTE — CARE COORDINATION
[facility] Transitions Initial Follow Up Call    Call within 2 business days of discharge: Yes    Patient: Harvinder Cordova Patient : 1945   MRN: [id number]    Reason for Admission: acute gastric ulcer with bleeding   Discharge Date: 18   RARS: Readmission Risk Score: 19, CMRS 10     Spoke with: Zenaida Casiano     Call to pt who states he is doing well. States his bowels have not moved since discharge but states understanding to monitor for blood/ bleeding. States he does still have external hemorrhoid that will bleed on occasion but uses Tucks pads to help with this. Writer reviewed role of CTC following discharge- v/u. Contact information provided. Agreeable to transition calls   States understanding to take bumex twice daily. States he has not picked up reglan yet- phamacy not open when he was discharged. States he thinks this medicine upsets his stomach (from time in hospital)- writer advises speaking to GI and PCP about this as there could be another medication he can take in its place if it does upset his stomach- v/u  States the swelling in his legs has gone down and states understanding to continue to monitor weight daily. Medications reviewed and reconciled. 1111F complete   States he will schedule appts himself- states he has been trying to reach Dr Olivia Aguayo office to move appt up (currently scheduled 18).    Denies needs  Encouraged call writer/ CTC or providers if questions or concerns- v/u     Message to central scheduling pool to call pt for TCM    Facility: [facility]     Non-face-to-face services provided:  Obtained and reviewed discharge summary and/or continuity of care documents  Assessment and support for treatment adherence and medication management-med rec and 1111    Care Transitions 24 Hour Call    Do you have any ongoing symptoms?:  No  Do you have a copy of your discharge instructions?:  Yes  Do you have all of your prescriptions and are they filled?:  Yes  Have you been contacted by a [address]?:  No  Have you scheduled your follow up appointment?:  No  Were you discharged with any Home Care or Post Acute Services:  No  Do you feel like you have everything you need to keep you well at home?:  Yes  Care Transitions Interventions     Other Services:  (Comment: Call to PCP, attempt to arrange appointment within parameters for medical transport. )      Transportation Support:  Completed          Follow Up  Future Appointments  Date Time Provider Jeri Flynn   8/31/2018 1:45 PM Artie Ma DO bariatric [facility]   10/8/2018 9:20 AM ADELINE Warren RN

## 2018-08-22 ENCOUNTER — OFFICE VISIT (OUTPATIENT)
Dept: GASTROENTEROLOGY | Age: 73
End: 2018-08-22
Payer: MEDICARE

## 2018-08-22 VITALS
HEART RATE: 75 BPM | BODY MASS INDEX: 32.9 KG/M2 | WEIGHT: 229.8 LBS | SYSTOLIC BLOOD PRESSURE: 102 MMHG | DIASTOLIC BLOOD PRESSURE: 62 MMHG | OXYGEN SATURATION: 95 % | TEMPERATURE: 97 F | HEIGHT: 70 IN

## 2018-08-22 DIAGNOSIS — K92.0 HEMATEMESIS WITH NAUSEA: ICD-10-CM

## 2018-08-22 DIAGNOSIS — K25.0 ACUTE GASTRIC ULCER WITH BLEEDING: Primary | ICD-10-CM

## 2018-08-22 PROCEDURE — 3017F COLORECTAL CA SCREEN DOC REV: CPT | Performed by: INTERNAL MEDICINE

## 2018-08-22 PROCEDURE — G8417 CALC BMI ABV UP PARAM F/U: HCPCS | Performed by: INTERNAL MEDICINE

## 2018-08-22 PROCEDURE — 1111F DSCHRG MED/CURRENT MED MERGE: CPT | Performed by: INTERNAL MEDICINE

## 2018-08-22 PROCEDURE — 1036F TOBACCO NON-USER: CPT | Performed by: INTERNAL MEDICINE

## 2018-08-22 PROCEDURE — G8427 DOCREV CUR MEDS BY ELIG CLIN: HCPCS | Performed by: INTERNAL MEDICINE

## 2018-08-22 PROCEDURE — 4040F PNEUMOC VAC/ADMIN/RCVD: CPT | Performed by: INTERNAL MEDICINE

## 2018-08-22 PROCEDURE — 1101F PT FALLS ASSESS-DOCD LE1/YR: CPT | Performed by: INTERNAL MEDICINE

## 2018-08-22 PROCEDURE — 1123F ACP DISCUSS/DSCN MKR DOCD: CPT | Performed by: INTERNAL MEDICINE

## 2018-08-22 PROCEDURE — 99204 OFFICE O/P NEW MOD 45 MIN: CPT | Performed by: INTERNAL MEDICINE

## 2018-08-22 ASSESSMENT — ENCOUNTER SYMPTOMS
VOMITING: 0
SHORTNESS OF BREATH: 1
CHOKING: 0
RECTAL PAIN: 0
COUGH: 1
TROUBLE SWALLOWING: 0
ABDOMINAL DISTENTION: 0
RHINORRHEA: 0
VOICE CHANGE: 0
WHEEZING: 1
CONSTIPATION: 0
ABDOMINAL PAIN: 1
BACK PAIN: 1
CHEST TIGHTNESS: 0
SINUS PAIN: 0
BLOOD IN STOOL: 0
COLOR CHANGE: 0
DIARRHEA: 1
NAUSEA: 0

## 2018-08-22 NOTE — PROGRESS NOTES
Subjective:      Patient ID: Huma Mahoney is a 71 y.o. male. HPI   Dr. Nya Rosario MD has requested that I see Huma Mahoney for a consult for   1. Acute gastric ulcer with bleeding    2. Hematemesis with nausea        Patient is seen in the office for follow-up of nausea vomiting. During this visit history was discussed with the patient and the patient's Daughter. It looks like patient was admitted at [facility] with nausea and vomiting. At that time he had coffee-colored emesis. No fresh blood. Prior to onset of emesis, patient appears to have dry heaves. He denied retching. patient had history of bariatric surgery done in 1985. Also during his recent admission at [facility], EGD revealed that he had questionable barium ball/bezoar formation seen in the gastric pouch. Also there is a description of narrowing of the opening from the gastric pouch to the rest of the body of the stomach. This area was dilated using Datavail CRE balloon starting from 8 mm up to 10 mm size. He was also found to have gastritis. Following discharge she is doing well. He has been tolerating liquid diet well. No nausea vomiting. Bowel movement satisfactory. According to the patient the bariatric surgeon wants the patient's to see him for further management of this narrowing. Past Medical, Family, and Social History reviewed and does contribute to the patient presenting condition. patient\"s PMH/PSH,SH,PSYCH hx, MEDs, ALLERGIES, and ROS was all reviewed and updated ion the appropriate sections      Review of Systems   Constitutional: Positive for fatigue. Negative for activity change, appetite change, chills, fever and unexpected weight change. HENT: Positive for hearing loss (wears hearing aids). Negative for dental problem, ear pain (right ear- pt states he has been to ENT for follow up), rhinorrhea, sinus pain, trouble swallowing and voice change.

## 2018-08-23 ENCOUNTER — OFFICE VISIT (OUTPATIENT)
Dept: FAMILY MEDICINE CLINIC | Age: 73
End: 2018-08-23
Payer: MEDICARE

## 2018-08-23 VITALS
BODY MASS INDEX: 32.78 KG/M2 | OXYGEN SATURATION: 96 % | HEIGHT: 70 IN | SYSTOLIC BLOOD PRESSURE: 100 MMHG | DIASTOLIC BLOOD PRESSURE: 58 MMHG | WEIGHT: 229 LBS | HEART RATE: 58 BPM | TEMPERATURE: 97 F

## 2018-08-23 DIAGNOSIS — R39.15 URGENCY OF URINATION: ICD-10-CM

## 2018-08-23 DIAGNOSIS — K25.0 ACUTE GASTRIC ULCER WITH BLEEDING: Primary | ICD-10-CM

## 2018-08-23 DIAGNOSIS — K21.9 GASTROESOPHAGEAL REFLUX DISEASE WITHOUT ESOPHAGITIS: ICD-10-CM

## 2018-08-23 DIAGNOSIS — I89.0 LYMPHEDEMA OF BOTH LOWER EXTREMITIES: ICD-10-CM

## 2018-08-23 DIAGNOSIS — R26.9 GAIT DISORDER: ICD-10-CM

## 2018-08-23 LAB
BILIRUBIN, POC: ABNORMAL
BLOOD URINE, POC: ABNORMAL
CLARITY, POC: CLEAR
COLOR, POC: ABNORMAL
GLUCOSE URINE, POC: ABNORMAL
KETONES, POC: ABNORMAL
LEUKOCYTE EST, POC: ABNORMAL
NITRITE, POC: ABNORMAL
PH, POC: 8
PROTEIN, POC: ABNORMAL
SPECIFIC GRAVITY, POC: 1.01
UROBILINOGEN, POC: ABNORMAL

## 2018-08-23 PROCEDURE — 81003 URINALYSIS AUTO W/O SCOPE: CPT | Performed by: PHYSICIAN ASSISTANT

## 2018-08-23 PROCEDURE — 99495 TRANSJ CARE MGMT MOD F2F 14D: CPT | Performed by: PHYSICIAN ASSISTANT

## 2018-08-23 ASSESSMENT — ENCOUNTER SYMPTOMS: SHORTNESS OF BREATH: 1

## 2018-08-23 NOTE — PROGRESS NOTES
[address]  [facility] [address]  Dept: [phone]  Dept Fax: [phone]    Office Progress/Follow Up Note  Date of patient's visit: 8/23/2018  Patient's Name:  Christine Goodman YOB: 1945            Patient Care Team:  Evonne Celestin PA-C as PCP - General (Physician Assistant)  Evonne Celestin PA-C as PCP - S Attributed Provider  Vivian Ugarte MD as Consulting Physician (Gastroenterology)  [address], RN as Care Transition  Cipriano Mejia RN as Care Transition  Geovany John MA as Care Transition  ================================================================    REASON FOR VISIT/CHIEF COMPLAINT:  Follow-Up from Hospital (Bleeding Ulcer, [facility], discharged 8/19/18)    HISTORY OF PRESENTING ILLNESS:  History was obtained from: patient, spouse. Christine Goodman is a 71 y.o. is here for follow-up for hospital admission. Patient wife is present. Patient went to the ER on 8/12/18 for nausea, vomiting, hematemesis, was admitted. Patient had EGD done, patient had gastritis and food bolus moved, tolerated procedure well. Patient states symptoms have resolved since being discharged from the hospital, is still on a liquid only diet. Sensation states he will be seen GI on 8/22/18 and bariatrics 08/24/18 to be released to regular diet. Pt was going to PT, states \"was helping\". UA in office was completed due to patient concerns, trace blood and protein. Patient is hypotensive in office. Discussed hypotension, increased risk of syncopal episodes in fall, instructed patient to take second dose of Bumex when necessary. Patient weight is stable. Labs reviewed. Medications reviewed.     HPI    Patient Active Problem List   Diagnosis    Atrial fibrillation ([address]    Hyperlipidemia    GERD (gastroesophageal reflux disease)    Osteoarthritis of lumbar spine    OA (osteoarthritis) of knee, bilateral    Foot drop, right    Impaired hearing    Chronic rhinosinusitis    Special screening for malignant neoplasm of prostate    Lymphedema of both lower extremities    Hallux valgus, acquired, bilateral    Tinnitus    Gait disorder    S/P revision of total knee    Fracture of femur [facility]    Hypertension    Slow transit constipation    Internal hemorrhoids    Calculus of gallbladder without cholecystitis without obstruction    MARIAMA on CPAP    Chronic diastolic heart failure (HCC)    Moderate obesity    Simple chronic bronchitis (HCC)    Ileus (HCC)    Acute gastric ulcer without hemorrhage or perforation    History of vertical band gastroplasty    Acute superficial gastritis without hemorrhage    Hematemesis    Acute gastric ulcer with bleeding    Acute diastolic congestive heart failure (HCC)       There are no preventive care reminders to display for this patient. Allergies   Allergen Reactions    Darvocet [Propoxyphene N-Acetaminophen] Hives    Other Hives    Oxycodone-Acetaminophen          Current Outpatient Prescriptions   Medication Sig Dispense Refill    bumetanide (BUMEX) 1 MG tablet Take 1 tablet by mouth 2 times daily 30 tablet 3    sucralfate (CARAFATE) 1 GM tablet Take 1 tablet by mouth 4 times daily 120 tablet 3    pantoprazole (PROTONIX) 40 MG tablet Take 1 tablet by mouth daily 30 tablet 3    Incontinence Supply Disposable (INCONTINENCE BRIEF LARGE) MISC To use as directed. Use 3x a day 90 each 3    polyethylene glycol (GLYCOLAX) powder Take 17 g by mouth daily 527 g 3    rivaroxaban (XARELTO) 20 MG TABS tablet Take 1 tablet by mouth daily (with breakfast) 30 tablet 2    potassium chloride (KLOR-CON M20) 20 MEQ extended release tablet TAKE 3 TABLETS BY MOUTH TWICE A  tablet 1    gabapentin (NEURONTIN) 300 MG capsule TAKE ONE CAPSULE BY MOUTH 3 TIMES A DAY.  270 capsule 1    albuterol (PROVENTIL) (2.5 MG/3ML) 0.083% nebulizer

## 2018-08-24 ENCOUNTER — OFFICE VISIT (OUTPATIENT)
Dept: BARIATRICS/WEIGHT MGMT | Age: 73
End: 2018-08-24
Payer: MEDICARE

## 2018-08-24 ENCOUNTER — CARE COORDINATION (OUTPATIENT)
Dept: CASE MANAGEMENT | Age: 73
End: 2018-08-24

## 2018-08-24 VITALS
WEIGHT: 230.3 LBS | HEIGHT: 70 IN | HEART RATE: 60 BPM | BODY MASS INDEX: 32.97 KG/M2 | OXYGEN SATURATION: 98 % | RESPIRATION RATE: 20 BRPM | DIASTOLIC BLOOD PRESSURE: 68 MMHG | SYSTOLIC BLOOD PRESSURE: 108 MMHG

## 2018-08-24 DIAGNOSIS — Z98.84 STATUS POST BARIATRIC SURGERY: ICD-10-CM

## 2018-08-24 DIAGNOSIS — K21.9 GASTROESOPHAGEAL REFLUX DISEASE WITHOUT ESOPHAGITIS: Primary | ICD-10-CM

## 2018-08-24 PROCEDURE — 1101F PT FALLS ASSESS-DOCD LE1/YR: CPT | Performed by: SURGERY

## 2018-08-24 PROCEDURE — 1036F TOBACCO NON-USER: CPT | Performed by: SURGERY

## 2018-08-24 PROCEDURE — G8427 DOCREV CUR MEDS BY ELIG CLIN: HCPCS | Performed by: SURGERY

## 2018-08-24 PROCEDURE — 4040F PNEUMOC VAC/ADMIN/RCVD: CPT | Performed by: SURGERY

## 2018-08-24 PROCEDURE — G8417 CALC BMI ABV UP PARAM F/U: HCPCS | Performed by: SURGERY

## 2018-08-24 PROCEDURE — 3017F COLORECTAL CA SCREEN DOC REV: CPT | Performed by: SURGERY

## 2018-08-24 PROCEDURE — 99213 OFFICE O/P EST LOW 20 MIN: CPT | Performed by: SURGERY

## 2018-08-24 PROCEDURE — 1111F DSCHRG MED/CURRENT MED MERGE: CPT | Performed by: SURGERY

## 2018-08-24 PROCEDURE — 1123F ACP DISCUSS/DSCN MKR DOCD: CPT | Performed by: SURGERY

## 2018-08-25 ASSESSMENT — ENCOUNTER SYMPTOMS
NAUSEA: 0
VOMITING: 0
CONSTIPATION: 0
DIARRHEA: 0
COUGH: 0
BACK PAIN: 0

## 2018-08-26 NOTE — PROGRESS NOTES
[facility]  [facility] INVASIVE BARIATRIC SURG  [address] [facility]  [address]  [address]  [address]  Dept: [phone]    8/24/2018    CC: Follow up from EGD and Nausea    History:  71year old male who underwent vertical banded gastroplasty over 20 years ago. He presents after recent hospital admission for GOO. He was started on Carafate and PPI for gastritis and underwent dilationn. He denies nausea today or abdominal pain. Tolerating full liquids. He denies melena or hematochezia. He presents today for further follow up.       Review of Systems:    General  Negative for: [] Weight Change   [x] Fatigue   [x] Fevers & Chills    [] Appetite change [] Other:    Positive for: [] Weight Change   [] Fatigue   [] Fevers & Chills    [] Appetite change [] Other:   Cardiac  Negative for: [x] Chest Pain   [] Difficulty Breathing   [] Leg Cramps [x] Edema of Lower Extremeties    [] Left   [] Right      Positive for: [] Chest Pain   [] Difficulty Breathing   [] Leg Cramps [] Edema of Lower Extremeties    [] Left   [] Right   Pulmonary  Negative for: [x] Shortness of Breath [] Wheeze [] Cough  [x] Calf Pain     Positive for: [] Shortness of Breath [] Wheeze [] Cough  [] Calf Pain   Gastro-Intestinal Negative for: [] Heartburn   [] Reflux   [] Dysphagia   [] Melena   [] BRBPR  [x] Vomiting   [x] Abdominal Pain   [] Diarrhea  [] Hernia    [] Constipation  [] Other:     Positive for: [x] Heartburn   [] Reflux   [] Dysphagia   [] Melena   [] BRBPR  [] Vomiting   [] Abdominal Pain   [] Diarrhea  [] Hernia    [] Constipation  [] Other:    Muskuloskeletal Negative for: [x] Joint pain   [] Back pain   [] Knee Pain   [] Muscle weakness   [x] Edema [] Other:     Positive for: [] Joint pain   [] Back pain   [] Knee Pain   [] Muscle weakness  [] Edema [] Other:    Neurologic Negative for: [x] Syncope   [x] Insomnia   [] Being treated for depression  [] Other:     Positive for: [] Syncope   [] Insomnia   [] Being treated for

## 2018-08-27 ENCOUNTER — HOSPITAL ENCOUNTER (OUTPATIENT)
Dept: PHYSICAL THERAPY | Facility: CLINIC | Age: 73
Setting detail: THERAPIES SERIES
Discharge: HOME | End: 2018-08-27
Payer: MEDICARE

## 2018-08-27 PROCEDURE — 97110 THERAPEUTIC EXERCISES: CPT

## 2018-08-27 NOTE — FLOWSHEET NOTE
leg Increased resistance 7/26   HS curls 15x 1# each leg Increased resistance 7/26   Balance feet close 3x30\" foam Added foam this date 8/2   3 way hip 10x 1#    Tandem standing balance 2x15\"  bilat          Gait in // bars      marching 2L     Heel to toe gait 2L     Retro walking 1 lap 1L      Side steps 2L  With gait belt around patient, facing patient holding belt, with patient's hands on clinician's forearms   Wide JACOB  2L           Step ups x10 4\"     AlterG walk 15'  Not today   Other: alteG 1.0-1.5 speed. 45%-60% body weight      Specific Instructions for next treatment: Seated posture with scapular retraction, decrease breaks during treatment       Treatment Charges: Mins Units   []  Modalities     [x]  Ther Exercise 45 3   []  Manual Therapy     []  Ther Activities     []  Aquatics     []  Vasocompression     [x]  Other: gait     Total Treatment time 45 3   Estimated Medicare Amount as of 8/27/18 = $616.32         Assessment: [x] Progressing toward goals. Resumed exercises per log with fair alexia. No standing exercises except for step ups and sit to stand this date due to poor endurance and still recovering from surgery and hospital stay. Pt tolerate mat exercises with no increase in pain noted just fatigue. Pt stating at end he feels he was doing great pre surgery and hospital stay and now has gone backwards since stomach issue. Pt is hoping for more visits for increased strength and endurance. Continue to progress as alexia. [] No change. [] Other:        G-CODE (set at eval on visit #1)       Functional Limitation: mobility  Functional Assessment Used: LEFI ( 82% overall limtations), TUG ( 34 sec)  Current Status Modifier: Ck  Goal Status Modifier: Ci  Discharge Status Modifier:     STG: (to be met in 5 treatments)  1. ? Strength: demonstrate 30 minutes of active exercise with no more than 3 breaks to increase endurance to activity.  MET 7/30  2. ? Function: demonstrate sitting upright in chair

## 2018-08-28 ENCOUNTER — CARE COORDINATION (OUTPATIENT)
Dept: CASE MANAGEMENT | Age: 73
End: 2018-08-28

## 2018-08-28 NOTE — CARE COORDINATION
[facility] Transitions Follow Up Call    2018    Patient: [address]  Patient : 1945   MRN: [id number]  Reason for Admission: Acute gastric ulcer with bleeding   Discharge Date: 18 RARS: Readmission Risk Score: 19       Spoke with: Severo Mustache to patient for care transitions. Patient reports he is doing well, just a little more tired today due to starting outpatient physical therapy yesterday. Patient reports plan to complete therapy twice a week. Patient denies questions/changes to medications at the time of outreach. Patient reports continuing with liquid diet. Patient denies new needs/concerns at the time of outreach. Care Transitions Subsequent and Final Call    Subsequent and Final Calls  Do you have any ongoing symptoms?:  No  Have your medications changed?:  No  Do you have any questions related to your medications?:  No  Do you currently have any active services?:  No  Do you have any needs or concerns that I can assist you with?:  No  Care Transitions Interventions     Other Services:  (Comment: Call to PCP, attempt to arrange appointment within parameters for medical transport. )      Transportation Support:  Completed   Other Interventions:             Follow Up  Future Appointments  Date Time Provider Jeri Flynn   2018 2:30 PM Adalberto Butterfield, PT [facility]   2018 1:00 PM DO tiffanie Cunningham palma [facility]   10/8/2018 9:20 AM ADELINE Warren [facility]       Nisha Wiseman, [address] [place] [address] Margaret Vazquezvard

## 2018-08-30 ENCOUNTER — HOSPITAL ENCOUNTER (OUTPATIENT)
Dept: PHYSICAL THERAPY | Facility: CLINIC | Age: 73
Setting detail: THERAPIES SERIES
Discharge: HOME | End: 2018-08-30
Payer: MEDICARE

## 2018-08-30 PROCEDURE — G8979 MOBILITY GOAL STATUS: HCPCS

## 2018-08-30 PROCEDURE — 97110 THERAPEUTIC EXERCISES: CPT

## 2018-08-30 PROCEDURE — G8978 MOBILITY CURRENT STATUS: HCPCS

## 2018-08-30 NOTE — FLOWSHEET NOTE
Added foam this date 8/2   3 way hip 10x 1#    Tandem standing balance 2x15\"  bilat          Gait in // bars      marching 2Laps     Heel to toe gait 2L     Retro walking 1 lap 1L      Side steps 2Laps each way  With gait belt around patient, facing patient holding belt, with patient's hands on clinician's forearms   Wide JACOB  2L           Step ups x10 4\"     AlterG walk 15'  Not today   Other: alteG 1.0-1.5 speed. 45%-60% body weight      Specific Instructions for next treatment: Seated posture with scapular retraction      Treatment Charges: Mins Units   []  Modalities     [x]  Ther Exercise 50 3   []  Manual Therapy     []  Ther Activities     []  Aquatics     []  Vasocompression     [x]  Other: gait     Total Treatment time 50 3   Estimated Medicare Amount as of 8/27/18 = $691.79 (updated on 8-30-18)      Assessment: [x] Progressing toward goals. . Pt with fair tolerance to exercises due to more recent hospitalization for gastric issues. Patient reports trying to increase activity at home, doing exercises as able and feels it is \"paying off\"  Starting to get more mobility around the home     [x] Other:  8-  TUG - 31 seconds, using walker  He does report a slight vertigo sit to stand feeling of \"flipping\"  /66 after exercise. LEFI = increased by 2.5 % overall from evaluation (had hospitalization during the 10 visits)   Patient starting to make improvements back to baseline before hospitalization and period of decreased activity. G-CODE (set at eval on visit #1) (reset at visit #10)      Functional Limitation: mobility  Functional Assessment Used: LEFI ( 80% overall limitations, 82% at eval)), TUG ( 31 sec today, 34 prior)  Current Status Modifier: Ck  Goal Status Modifier: Ci  Discharge Status Modifier:     STG: (to be met in 5 treatments)  1. ? Strength: demonstrate 30 minutes of active exercise with no more than 3 breaks to increase endurance to activity.  MET 7/30  2. ? Function: demonstrate sitting upright in chair with scapular retraction. 3.    4. Independent with Home Exercise Programs  Demonstrate Knowledge of fall prevention 7-16-18 met, issued and educated on fall prevention.     LTG: (to be met in 10 treatments)  1. Improve LEFI score, by 1 level for a (usual housework) - unmet, d (walking between rooms) - jame ortega J (in and out of car - unmet, same)  2. Decrease pain from 4/10 to 2/10 maximum, 8/30/18  3. progressing, 3/10 in left knee  4. Up and down 5 steps safely, 1 rail to simulate getting in and out of home. ONGOING  5. Improve TUG score by 6 seconds, partially met, improved by 3 seconds, 8/30/18    ADDITIONAL LTG\"s continue to address unmet goals above, focus PT on LE balance, endurance, ADL functioning and gait stability    Pt. Education:  [x] Yes  [] No  [x] Reviewed Prior HEP/Ed  Method of Education: [x] Verbal  [] Demo  [x] Written: provided handout of SLR, bridges, hip add at patient request  Comprehension of Education:  [x] Verbalizes understanding. [x] Demonstrates understanding. [] Needs review. [] Demonstrates/verbalizes HEP/Ed previously given. Plan: [x] Continue per plan of care.    [] Other:      Time In: 2;  20 pm           Time Out:  3: 12 pm    Electronically signed by:  Marely Melo, PT

## 2018-09-04 ENCOUNTER — HOSPITAL ENCOUNTER (OUTPATIENT)
Dept: PHYSICAL THERAPY | Facility: CLINIC | Age: 73
Setting detail: THERAPIES SERIES
Discharge: HOME | End: 2018-09-04
Payer: MEDICARE

## 2018-09-04 PROCEDURE — 97110 THERAPEUTIC EXERCISES: CPT

## 2018-09-04 NOTE — FLOWSHEET NOTE
[] Ana Carlson       Outpatient Physical        Therapy       [address]       Phone: [phone]       Fax: [phone] [x] [facility] at [address] [facility]       Phone: [phone]       Fax: [phone] [] [facility]  [address]   Phone: [phone]   Fax:  [phone]     Physical Therapy Daily Treatment Note    Date:  2018  Patient: Simpson Saint                                                      : 1945                    MRN: [id number]  Physician: Aldo Blood PA-C                                   Insurance: Medicare, Medicaid  Medical Diagnosis: gait disorder                                          Rehab Codes: R26.2, R62.81, M62.591, M62.592, R26.89  Onset date: 2018                            Next 's appt.: 2018  Visit# / total visits:  ( 10 additional visits approved 18)      Cancels/No Shows: 0/0    Subjective:     Pain:  [x] Yes  [] No Location: abdomen/back, bilat knee Pain Rating: (0-10 scale) 3/10 bilat knee  Pain altered Tx:  [x] No  [] Yes  Action:  Comments: Pt reports overall soreness today.      Todays Treatment:  Modalities:   Precautions:COPD  Exercises: bolded completed  Exercise Reps/ Time Weight/ Level Comments   SciFit - seat at 11 6 min L5    UBE 6 min L5              Mat         bridging 25x       SLR 15x each    Quad lag on LLE; began on left with quad sets to encourage terminal knee extension throughout SLR   clamshells 15x green Progressed to Side lying    Hip add, pillow squeeze, knee bents 20x    Increased    Sit to stand 2x10  No rest breaks, minimal arm assist    SAQ  15x each       // bars        Heel raises  15x       Mini squats 15x     marching 15x 1# each leg Increased resistance    HS curls 15x 1# each leg Increased resistance    Balance feet close 3x30\" foam Added foam this date

## 2018-09-06 ENCOUNTER — HOSPITAL ENCOUNTER (OUTPATIENT)
Dept: PHYSICAL THERAPY | Facility: CLINIC | Age: 73
Setting detail: THERAPIES SERIES
Discharge: HOME | End: 2018-09-06
Payer: MEDICARE

## 2018-09-06 PROCEDURE — 97110 THERAPEUTIC EXERCISES: CPT

## 2018-09-06 NOTE — FLOWSHEET NOTE
[] Sanaz Paiz       Outpatient Physical        Therapy       [address]       Phone: [phone]       Fax: [phone] [x] [facility] at [address] [facility]       Phone: [phone]       Fax: [phone] [] [facility]  [address] [place] [address]   Phone: [phone]   Fax:  [phone]     Physical Therapy Daily Treatment Note    Date:  2018  Patient: Garcia Meraz                                                      : 1945                    MRN: [id number]  Physician: Leanna Ddod PA-C                                   Insurance: Medicare, Medicaid  Medical Diagnosis: gait disorder                                          Rehab Codes: R26.2, R62.81, M62.591, M62.592, R26.89  Onset date: 2018                            Next 's appt.: 2018  Visit# / total visits:  ( 10 additional visits approved 18)      Cancels/No Shows: 0/0    Subjective:     Pain:  [x] Yes  [] No Location: abdomen/back, bilat knee Pain Rating: (0-10 scale) 3/10 bilat knee  Pain altered Tx:  [x] No  [] Yes  Action:  Comments: Pt reports overall soreness today.      Todays Treatment:  Modalities:   Precautions:COPD  Exercises: bolded completed  Exercise Reps/ Time Weight/ Level Comments   SciFit - seat at 11 6 min L5    UBE 6 min L5              Mat         bridging 25x       SLR 15x each    Quad lag on LLE; began on left with quad sets to encourage terminal knee extension throughout SLR   clamshells 15x green Progressed to Side lying    Hip add, pillow squeeze, knee bents 20x    Increased    Sit to stand 2x10  No rest breaks, minimal arm assist    SAQ  15x each       // bars        Heel raises  15x       Mini squats 15x     marching 15x 1# each leg Increased resistance    HS curls 15x 1# each leg Increased resistance    Balance feet close 3x30\" foam Added foam this date  sitting upright in chair with scapular retraction. 3.    4. Independent with Home Exercise Programs  Demonstrate Knowledge of fall prevention 7-16-18 met, issued and educated on fall prevention.     LTG: (to be met in 10 treatments)  1. Improve LEFI score, by 1 level for a (usual housework) - unmet, d (walking between rooms) - jame ortega J (in and out of car - unmet, same)  2. Decrease pain from 4/10 to 2/10 maximum, 8/30/18  3. progressing, 3/10 in left knee  4. Up and down 5 steps safely, 1 rail to simulate getting in and out of home. ONGOING  5. Improve TUG score by 6 seconds, partially met, improved by 3 seconds, 8/30/18    ADDITIONAL LTG\"s continue to address unmet goals above, focus PT on LE balance, endurance, ADL functioning and gait stability    Pt. Education:  [x] Yes  [] No  [x] Reviewed Prior HEP/Ed  Method of Education: [x] Verbal  [] Demo  [] Written:   Comprehension of Education:  [x] Verbalizes understanding. [x] Demonstrates understanding. [] Needs review. [] Demonstrates/verbalizes HEP/Ed previously given. Plan: [x] Continue per plan of care.    [] Other:      Time In: 8:00 am           Time Out:  9:00 am    Electronically signed by:  Hernando Bishop PTA

## 2018-09-07 ENCOUNTER — OFFICE VISIT (OUTPATIENT)
Dept: BARIATRICS/WEIGHT MGMT | Age: 73
End: 2018-09-07
Payer: MEDICARE

## 2018-09-07 VITALS
HEART RATE: 66 BPM | WEIGHT: 238 LBS | DIASTOLIC BLOOD PRESSURE: 62 MMHG | RESPIRATION RATE: 20 BRPM | SYSTOLIC BLOOD PRESSURE: 128 MMHG | HEIGHT: 70 IN | BODY MASS INDEX: 34.07 KG/M2

## 2018-09-07 DIAGNOSIS — Z98.84 STATUS POST BARIATRIC SURGERY: ICD-10-CM

## 2018-09-07 DIAGNOSIS — K21.9 GASTROESOPHAGEAL REFLUX DISEASE WITHOUT ESOPHAGITIS: Primary | ICD-10-CM

## 2018-09-07 PROCEDURE — G8417 CALC BMI ABV UP PARAM F/U: HCPCS | Performed by: SURGERY

## 2018-09-07 PROCEDURE — 1123F ACP DISCUSS/DSCN MKR DOCD: CPT | Performed by: SURGERY

## 2018-09-07 PROCEDURE — G8427 DOCREV CUR MEDS BY ELIG CLIN: HCPCS | Performed by: SURGERY

## 2018-09-07 PROCEDURE — 3017F COLORECTAL CA SCREEN DOC REV: CPT | Performed by: SURGERY

## 2018-09-07 PROCEDURE — 4040F PNEUMOC VAC/ADMIN/RCVD: CPT | Performed by: SURGERY

## 2018-09-07 PROCEDURE — 1101F PT FALLS ASSESS-DOCD LE1/YR: CPT | Performed by: SURGERY

## 2018-09-07 PROCEDURE — 99213 OFFICE O/P EST LOW 20 MIN: CPT | Performed by: SURGERY

## 2018-09-07 PROCEDURE — 1036F TOBACCO NON-USER: CPT | Performed by: SURGERY

## 2018-09-07 PROCEDURE — 1111F DSCHRG MED/CURRENT MED MERGE: CPT | Performed by: SURGERY

## 2018-09-09 NOTE — PROGRESS NOTES
[facility]  [facility] INVASIVE BARIATRIC SURG  [address] [facility]  [address]  [address]  [address]  Dept: [phone]    9/7/2018    CC: Follow up from EGD and Nausea    History:  71year old male who underwent vertical banded gastroplasty over 20 years ago. He presents after recent hospital admission for GOO. He was started on Carafate and PPI for gastritis and underwent dilation. He denies nausea today or abdominal pain. Tolerating full liquids. He denies melena or hematochezia. He presents today for further follow up. He wants to advance his diet. He is with daughter today. He states he continues to improve.     Review of Systems:    General  Negative for: [] Weight Change   [x] Fatigue   [x] Fevers & Chills    [] Appetite change [] Other:    Positive for: [] Weight Change   [] Fatigue   [] Fevers & Chills    [] Appetite change [] Other:   Cardiac  Negative for: [x] Chest Pain   [] Difficulty Breathing   [] Leg Cramps [x] Edema of Lower Extremeties    [] Left   [] Right      Positive for: [] Chest Pain   [] Difficulty Breathing   [] Leg Cramps [] Edema of Lower Extremeties    [] Left   [] Right   Pulmonary  Negative for: [x] Shortness of Breath [] Wheeze [x] Cough  [] Calf Pain     Positive for: [] Shortness of Breath [] Wheeze [] Cough  [] Calf Pain   Gastro-Intestinal Negative for: [] Heartburn   [] Reflux   [x] Dysphagia   [x] Melena   [] BRBPR  [x] Vomiting   [x] Abdominal Pain   [x] Diarrhea  [] Hernia    [] Constipation  [] Other:     Positive for: [] Heartburn   [] Reflux   [] Dysphagia   [] Melena   [] BRBPR  [] Vomiting   [] Abdominal Pain   [] Diarrhea  [] Hernia    [] Constipation  [] Other:    Muskuloskeletal Negative for: [] Joint pain   [] Back pain   [] Knee Pain   [x] Muscle weakness   [x] Edema [] Other:     Positive for: [] Joint pain   [] Back pain   [] Knee Pain   [] Muscle weakness  [] Edema [] Other:    Neurologic Negative for: [x] Syncope   [x] Insomnia   [] Being treated for depression  [] Other:     Positive for: [] Syncope   [] Insomnia   [] Being treated for depression  [] Other:    Skin Negative for: [x] Wound:   [] Open   [] Draining   [] Incisional     [x] Rash   [] Hair Loss  [] Other:     Positive for: [] Wound:   [] Open   [] Draining    [] Incisional  [] Rash   [] Hair Loss  [] Other:          Physical Exam:  /62 (Site: Left Upper Arm, Position: Sitting, Cuff Size: Large Adult)   Pulse 66   Resp 20   Ht 5' 10\" (1.778 m)   Wt 238 lb (108 kg)   BMI 34.15 kg/m²   Constitutional:  Vital signs are normal. The patient appears well-developed and well-nourished. HEENT:   Head: Normocephalic. Atraumatic  Eyes: pupils are equal and reactive. No scleral icterus is present. Neck: No mass and no thyromegaly present. Cardiovascular: Normal rate, regular rhythm, S1 normal and S2 normal.  Radial pulses present   Pulmonary/Chest: Effort normal and breath sounds normal. No retractions  Abdominal: Soft. Normal appearance. There is no organomegaly. No tenderness. There is no rigidity, no rebound, no guarding and no Diaz's sign. Musculoskeletal:        Right lower leg: Normal. No tenderness and no edema. Left lower leg: Normal. No tenderness and no edema. Neurological: The patient is alert and oriented. Moving all 4 extremities, sensation grossly intact bilateral  Skin: Skin is warm, dry and intact. Psychiatric: The patient has a normal mood and affect. Speech is normal and behavior is normal. Judgment and thought content normal. Cognition and memory are normal.     Assessment:  GERD secondary to prior bariatric surgery  Status post dilation of VBG  Gastritis  Tolerating full liquids    Plan:  PPI daily  Carafate  Reglan BID prn  May need further dilation  He seems to be progressing and his nausea is improved.   Will advance to pureed diet and monitor progress  Dietician met with patient today regarding diet  Follow up in 1 month

## 2018-09-10 ENCOUNTER — HOSPITAL ENCOUNTER (OUTPATIENT)
Dept: PHYSICAL THERAPY | Facility: CLINIC | Age: 73
Setting detail: THERAPIES SERIES
Discharge: HOME | End: 2018-09-10
Payer: MEDICARE

## 2018-09-10 PROCEDURE — 97110 THERAPEUTIC EXERCISES: CPT

## 2018-09-10 NOTE — FLOWSHEET NOTE
[] Monty Power       Outpatient Physical        Therapy       [address]       Phone: [phone]       Fax: [phone] [x] [facility] at [address] [facility]       Phone: [phone]       Fax: [phone] [] [facility]  [address] [place] [address]   Phone: [phone]   Fax:  [phone]     Physical Therapy Daily Treatment Note    Date:  9/10/2018  Patient: El Mcdonnell                                                      : 1945                    MRN: [id number]  Physician: Jed Sterling PA-C                                   Insurance: Medicare, Medicaid  Medical Diagnosis: gait disorder                                          Rehab Codes: R26.2, R62.81, M62.591, M62.592, R26.89  Onset date: 2018                            Next 's appt.: 2018  Visit# / total visits:  ( 10 additional visits approved 18)      Cancels/No Shows: 0/0    Subjective: pt arrived with overall body pain, stating \"my joints are just achy this morning\".    Pain:  [x] Yes  [] No Location: abdomen/back, bilat knee Pain Rating: (0-10 scale) 3/10   Pain altered Tx:  [x] No  [] Yes  Action:  Comments:     Todays Treatment:  Modalities:   Precautions:COPD  Exercises: bolded completed  Exercise Reps/ Time Weight/ Level Comments   SciFit - seat at 11 6 min L5    UBE 6 min L5              Mat         bridging 25x       SLR 15x each    Quad lag on LLE; began on left with quad sets to encourage terminal knee extension throughout SLR   clamshells 15x blue Progressed to Side lying    Hip add, pillow squeeze, knee bents 20x    Increased /   Sit to stand 2x10  No rest breaks, minimal arm assist    SAQ  15x each       // bars        Heel raises  15x       Mini squats 15x     marching 15x 1# each leg Increased resistance    HS curls 15x 1# each leg Increased resistance    Balance feet close 3x30\" foam Added foam this date 8/2   3 way hip 15x green    Tandem standing balance 2x20\"  bilat          Gait in // bars      marching 2Laps     Heel to toe gait 2L     Retro walking 1 lap 1L      Side steps 2Laps each way  With gait belt around patient, facing patient holding belt, with patient's hands on clinician's forearms   Wide JACOB  2L           Step ups x15 6\"     AlterG walk 15'  Not today   Other: alteG 1.0-1.5 speed. 45%-60% body weight    8-  TUG - 31 seconds, using walker  He does report a slight vertigo sit to stand feeling of \"flipping\"  /66 after exercise. LEFI = increased by 2.5 % overall from evaluation (had hospitalization during the 10 visits)   Patient starting to make improvements back to baseline before hospitalization and period of decreased activity. 123/68 BP 9/4/18    Specific Instructions for next treatment: Seated posture with scapular retraction      Treatment Charges: Mins Units   []  Modalities     [x]  Ther Exercise 50 3   []  Manual Therapy     []  Ther Activities     []  Aquatics     []  Vasocompression     [x]  Other: gait     Total Treatment time 50 3   Estimated Medicare Amount as of 9/10/18 = $918.20      Assessment: [x] Progressing toward goals. Progressed pt with increasing reps for stairs and 4 way hip, as well as increased resistance for 4 way hip and clamshells, with increased step height this date. Pt noting increased fatigue this date with progressions, with needed rest breaks for proper form with exercises this date. Pt noting this weather is not helping with fatigue level, and hopes body can acclimate to weather sooner rather than later.  Continue to progress as alexia.       [] Other:            G-CODE (set at eval on visit #1) (reset at visit #10)      Functional Limitation: mobility  Functional Assessment Used: LEFI ( 80% overall limitations, 82% at eval)), TUG ( 31 sec today, 34 prior)  Current Status Modifier: Ck  Goal Status Modifier:

## 2018-09-13 ENCOUNTER — HOSPITAL ENCOUNTER (OUTPATIENT)
Dept: PHYSICAL THERAPY | Facility: CLINIC | Age: 73
Setting detail: THERAPIES SERIES
Discharge: HOME | End: 2018-09-13
Payer: MEDICARE

## 2018-09-13 PROCEDURE — 97110 THERAPEUTIC EXERCISES: CPT

## 2018-09-13 NOTE — FLOWSHEET NOTE
HS curls 15x 1# each leg Increased resistance 7/26   Balance feet close 3x30\" foam Added foam this date 8/2   3 way hip 15x green    Tandem standing balance 2x20\"  bilat          Gait in // bars      marching 2Laps     Heel to toe gait 2L     Retro walking 1 lap 1L      Side steps 2Laps each way  With gait belt around patient, facing patient holding belt, with patient's hands on clinician's forearms   Wide JACOB  2L           Step ups/downs x20 6\"/4\"     AlterG walk 15'  Not today   Other: alteG 1.0-1.5 speed. 45%-60% body weight    8-  TUG - 31 seconds, using walker  He does report a slight vertigo sit to stand feeling of \"flipping\"  /66 after exercise. LEFI = increased by 2.5 % overall from evaluation (had hospitalization during the 10 visits)   Patient starting to make improvements back to baseline before hospitalization and period of decreased activity. 123/68 BP 9/4/18    Specific Instructions for next treatment: Seated posture with scapular retraction      Treatment Charges: Mins Units   []  Modalities     [x]  Ther Exercise 50 3   []  Manual Therapy     []  Ther Activities     []  Aquatics     []  Vasocompression     [x]  Other: gait     Total Treatment time 50 3   Estimated Medicare Amount as of 9/13/18 = $993.67       Assessment: [x] Progressing toward goals. Continued with stretches and exercises per log with good alexia. Pt displaying increased fatigue with exercises this date, with needing 3 rest breaks to complete exercises this date. Progressed pt with adding step downs, and increasing reps for step ups, SLR, and clamshells.  Continue to progress as alexia.      [] Other:            G-CODE (set at eval on visit #1) (reset at visit #10)      Functional Limitation: mobility  Functional Assessment Used: LEFI ( 80% overall limitations, 82% at eval)), TUG ( 31 sec today, 34 prior)  Current Status Modifier: Ck  Goal Status Modifier: Ci  Discharge Status Modifier:     STG: (to be met in 5

## 2018-09-17 ENCOUNTER — HOSPITAL ENCOUNTER (OUTPATIENT)
Dept: PHYSICAL THERAPY | Facility: CLINIC | Age: 73
Setting detail: THERAPIES SERIES
Discharge: HOME | End: 2018-09-17
Payer: MEDICARE

## 2018-09-17 PROCEDURE — 97110 THERAPEUTIC EXERCISES: CPT

## 2018-09-20 ENCOUNTER — HOSPITAL ENCOUNTER (OUTPATIENT)
Dept: PHYSICAL THERAPY | Facility: CLINIC | Age: 73
Setting detail: THERAPIES SERIES
Discharge: HOME | End: 2018-09-20
Payer: MEDICARE

## 2018-09-20 PROCEDURE — 97110 THERAPEUTIC EXERCISES: CPT

## 2018-09-21 DIAGNOSIS — K25.3 ACUTE GASTRIC ULCER WITHOUT HEMORRHAGE OR PERFORATION: ICD-10-CM

## 2018-09-21 RX ORDER — PANTOPRAZOLE SODIUM 40 MG/1
TABLET, DELAYED RELEASE ORAL
Qty: 28 TABLET | Refills: 1 | Status: SHIPPED | OUTPATIENT
Start: 2018-09-21 | End: 2018-11-10 | Stop reason: SDUPTHER

## 2018-09-21 RX ORDER — MULTIVITAMIN WITH FOLIC ACID 400 MCG
1 TABLET ORAL DAILY
Qty: 30 TABLET | Refills: 2 | Status: SHIPPED | OUTPATIENT
Start: 2018-09-21 | End: 2019-01-09 | Stop reason: SDUPTHER

## 2018-09-21 NOTE — TELEPHONE ENCOUNTER
Last visit:08/23/2018  Last Med refill:08/22/2018    Next Visit Date:  Future Appointments  Date Time Provider Jeri Vasquezi   9/24/2018 8:00 AM Tony Brink, PTA [facility]   9/27/2018 8:00 AM Tony Brink, PTA [facility]   10/1/2018 9:00 AM Tony Brink, PTA [facility]   10/4/2018 9:00 AM Quillironn Phil, PT [facility]   10/8/2018 9:20 AM ADELINE Lujan [facility]   10/11/2018 9:00 AM Carrie November, DO bariatric palma [address] Maintenance   Topic Date Due    Flu vaccine (1) 09/01/2018    Shingles Vaccine (1 of 2 - 2 Dose Series) 04/03/2019 (Originally 10/31/1996)    DTaP/Tdap/Td vaccine (1 - Tdap) 04/10/2019 (Originally 10/31/1965)    Pneumococcal low/med risk (2 of 2 - PPSV23) 08/17/2019    Potassium monitoring  08/19/2019    Creatinine monitoring  08/19/2019    Colon cancer screen colonoscopy  03/26/2023    Lipid screen  07/06/2023    AAA screen  Completed    Hepatitis C screen  Completed       No results found for: LABA1C          ( goal A1C is < 7)   No results found for: LABMICR  LDL Cholesterol (mg/dL)   Date Value   07/06/2018 85   07/06/2018 84     LDL Calculated (mg/dL)   Date Value   10/30/2013 67       (goal LDL is <100)   AST (U/L)   Date Value   08/19/2018 14     ALT (U/L)   Date Value   08/19/2018 7     BUN (mg/dL)   Date Value   08/19/2018 5 (L)     BP Readings from Last 3 Encounters:   09/07/18 128/62   08/24/18 108/68   08/23/18 (!) 100/58          (goal 120/80)    All Future Testing planned in [facility]  Lab Frequency Next Occurrence               Patient Active Problem List:     Atrial fibrillation (HCC)     Hyperlipidemia     GERD (gastroesophageal reflux disease)     Osteoarthritis of lumbar spine     OA (osteoarthritis) of knee, bilateral     Foot drop, right     Impaired hearing     Chronic rhinosinusitis     Special screening for malignant neoplasm of prostate     Lymphedema of both lower

## 2018-09-24 ENCOUNTER — HOSPITAL ENCOUNTER (OUTPATIENT)
Dept: PHYSICAL THERAPY | Facility: CLINIC | Age: 73
Setting detail: THERAPIES SERIES
Discharge: HOME | End: 2018-09-24
Payer: MEDICARE

## 2018-09-24 PROCEDURE — 97110 THERAPEUTIC EXERCISES: CPT

## 2018-09-24 NOTE — FLOWSHEET NOTE
7/26   Balance feet close 3x30\" foam Added foam this date 8/2   3 way hip 15x green    Tandem standing balance 2x20\"  bilat          Gait in // bars      marching 2Laps     Heel to toe gait 2L     Retro walking 1 lap 1L      Side steps 2Laps each way  With gait belt around patient, facing patient holding belt, with patient's hands on clinician's forearms   Wide JACOB  2L     Ambulating around gym 2'  O2 96%,           Step ups/downs x20 6\"  lateral    AlterG walk 15'  Not today   Other: alteG 1.0-1.5 speed. 45%-60% body weight    8-  TUG - 31 seconds, using walker  He does report a slight vertigo sit to stand feeling of \"flipping\"  /66 after exercise. LEFI = increased by 2.5 % overall from evaluation (had hospitalization during the 10 visits)   Patient starting to make improvements back to baseline before hospitalization and period of decreased activity. 123/68 BP 9/4/18    Specific Instructions for next treatment: Seated posture with scapular retraction      Treatment Charges: Mins Units   []  Modalities     [x]  Ther Exercise 50 3   []  Manual Therapy     []  Ther Activities     []  Aquatics     []  Vasocompression     [x]  Other: gait     Total Treatment time 50 3   Estimated Medicare Amount as of 9/24/18 = $1220.08      Assessment: [x] Progressing toward goals. Continued with exercises this date, with poor tolerance this date. Pt displaying extreme fatigue with exercises with needing 5-6 longer rest breaks, with pt stating he thinks the weather is a factor in this. Able to progress pt with adding lateral step ups, and increasing resistance for clamshells, with fair tolerance. Pt continues to struggle with balance,as well as long distance ambulation.  Continue to progress as alexia.      [] Other:            G-CODE (set at eval on visit #1) (reset at visit #10)      Functional Limitation: mobility  Functional Assessment Used: LEFI ( 80% overall limitations, 82% at eval)), TUG ( 31 sec today,

## 2018-09-27 ENCOUNTER — TELEPHONE (OUTPATIENT)
Dept: BARIATRICS/WEIGHT MGMT | Age: 73
End: 2018-09-27

## 2018-09-27 ENCOUNTER — HOSPITAL ENCOUNTER (INPATIENT)
Age: 73
LOS: 6 days | Discharge: HOME | DRG: 391 | End: 2018-10-04
Attending: EMERGENCY MEDICINE | Admitting: INTERNAL MEDICINE
Payer: MEDICARE

## 2018-09-27 ENCOUNTER — APPOINTMENT (OUTPATIENT)
Dept: CT IMAGING | Age: 73
DRG: 391 | End: 2018-09-27
Payer: MEDICARE

## 2018-09-27 ENCOUNTER — HOSPITAL ENCOUNTER (OUTPATIENT)
Dept: PHYSICAL THERAPY | Facility: CLINIC | Age: 73
Setting detail: THERAPIES SERIES
Discharge: HOME | End: 2018-09-27
Payer: MEDICARE

## 2018-09-27 ENCOUNTER — APPOINTMENT (OUTPATIENT)
Dept: GENERAL RADIOLOGY | Age: 73
DRG: 391 | End: 2018-09-27
Payer: MEDICARE

## 2018-09-27 DIAGNOSIS — R11.0 NAUSEA: Primary | ICD-10-CM

## 2018-09-27 PROBLEM — R11.10 ACUTE VOMITING: Status: ACTIVE | Noted: 2018-09-27

## 2018-09-27 LAB
-: NORMAL
ALBUMIN SERPL-MCNC: 3.5 G/DL (ref 3.5–5.2)
ALBUMIN/GLOBULIN RATIO: 1 (ref 1–2.5)
ALP BLD-CCNC: 103 U/L (ref 40–129)
ALT SERPL-CCNC: 11 U/L (ref 5–41)
AMORPHOUS: NORMAL
ANION GAP SERPL CALCULATED.3IONS-SCNC: 13 MMOL/L (ref 9–17)
AST SERPL-CCNC: 23 U/L
BACTERIA: NORMAL
BILIRUB SERPL-MCNC: 1.03 MG/DL (ref 0.3–1.2)
BILIRUBIN URINE: NEGATIVE
BUN BLDV-MCNC: 15 MG/DL (ref 8–23)
BUN/CREAT BLD: ABNORMAL (ref 9–20)
CALCIUM SERPL-MCNC: 9.4 MG/DL (ref 8.6–10.4)
CASTS UA: NORMAL /LPF (ref 0–8)
CHLORIDE BLD-SCNC: 100 MMOL/L (ref 98–107)
CO2: 28 MMOL/L (ref 20–31)
COLOR: YELLOW
COMMENT UA: ABNORMAL
CREAT SERPL-MCNC: 0.77 MG/DL (ref 0.7–1.2)
CRYSTALS, UA: NORMAL /HPF
EKG ATRIAL RATE: 100 BPM
EKG Q-T INTERVAL: 406 MS
EKG QRS DURATION: 124 MS
EKG QTC CALCULATION (BAZETT): 493 MS
EKG R AXIS: 147 DEGREES
EKG T AXIS: -12 DEGREES
EKG VENTRICULAR RATE: 89 BPM
EPITHELIAL CELLS UA: NORMAL /HPF (ref 0–5)
GFR AFRICAN AMERICAN: >60 ML/MIN
GFR NON-AFRICAN AMERICAN: >60 ML/MIN
GFR SERPL CREATININE-BSD FRML MDRD: ABNORMAL ML/MIN/{1.73_M2}
GFR SERPL CREATININE-BSD FRML MDRD: ABNORMAL ML/MIN/{1.73_M2}
GLUCOSE BLD-MCNC: 125 MG/DL (ref 70–99)
GLUCOSE URINE: NEGATIVE
HCT VFR BLD CALC: 37.6 % (ref 40.7–50.3)
HEMOGLOBIN: 11.6 G/DL (ref 13–17)
KETONES, URINE: NEGATIVE
LACTIC ACID, WHOLE BLOOD: 1.5 MMOL/L (ref 0.7–2.1)
LEUKOCYTE ESTERASE, URINE: NEGATIVE
LIPASE: 7 U/L (ref 13–60)
MCH RBC QN AUTO: 29.2 PG (ref 25.2–33.5)
MCHC RBC AUTO-ENTMCNC: 30.9 G/DL (ref 28.4–34.8)
MCV RBC AUTO: 94.7 FL (ref 82.6–102.9)
MUCUS: NORMAL
NITRITE, URINE: NEGATIVE
NRBC AUTOMATED: 0 PER 100 WBC
OTHER OBSERVATIONS UA: NORMAL
PDW BLD-RTO: 16.1 % (ref 11.8–14.4)
PH UA: 8.5 (ref 5–8)
PLATELET # BLD: 215 K/UL (ref 138–453)
PMV BLD AUTO: 8.9 FL (ref 8.1–13.5)
POC TROPONIN I: 0.03 NG/ML (ref 0–0.1)
POC TROPONIN INTERP: NORMAL
POTASSIUM SERPL-SCNC: 4.5 MMOL/L (ref 3.7–5.3)
PROTEIN UA: NEGATIVE
RBC # BLD: 3.97 M/UL (ref 4.21–5.77)
RBC UA: NORMAL /HPF (ref 0–4)
RENAL EPITHELIAL, UA: NORMAL /HPF
SODIUM BLD-SCNC: 141 MMOL/L (ref 135–144)
SPECIFIC GRAVITY UA: 1.01 (ref 1–1.03)
TOTAL PROTEIN: 6.9 G/DL (ref 6.4–8.3)
TRICHOMONAS: NORMAL
TURBIDITY: CLEAR
URINE HGB: ABNORMAL
UROBILINOGEN, URINE: NORMAL
WBC # BLD: 13 K/UL (ref 3.5–11.3)
WBC UA: NORMAL /HPF (ref 0–5)
YEAST: NORMAL

## 2018-09-27 PROCEDURE — 81001 URINALYSIS AUTO W/SCOPE: CPT

## 2018-09-27 PROCEDURE — 2580000003 HC RX 258: Performed by: STUDENT IN AN ORGANIZED HEALTH CARE EDUCATION/TRAINING PROGRAM

## 2018-09-27 PROCEDURE — 93005 ELECTROCARDIOGRAM TRACING: CPT

## 2018-09-27 PROCEDURE — G0378 HOSPITAL OBSERVATION PER HR: HCPCS

## 2018-09-27 PROCEDURE — 84484 ASSAY OF TROPONIN QUANT: CPT

## 2018-09-27 PROCEDURE — 71045 X-RAY EXAM CHEST 1 VIEW: CPT

## 2018-09-27 PROCEDURE — 80053 COMPREHEN METABOLIC PANEL: CPT

## 2018-09-27 PROCEDURE — 6360000002 HC RX W HCPCS: Performed by: STUDENT IN AN ORGANIZED HEALTH CARE EDUCATION/TRAINING PROGRAM

## 2018-09-27 PROCEDURE — 6370000000 HC RX 637 (ALT 250 FOR IP): Performed by: STUDENT IN AN ORGANIZED HEALTH CARE EDUCATION/TRAINING PROGRAM

## 2018-09-27 PROCEDURE — 83690 ASSAY OF LIPASE: CPT

## 2018-09-27 PROCEDURE — 6360000002 HC RX W HCPCS

## 2018-09-27 PROCEDURE — 74019 RADEX ABDOMEN 2 VIEWS: CPT

## 2018-09-27 PROCEDURE — 74177 CT ABD & PELVIS W/CONTRAST: CPT

## 2018-09-27 PROCEDURE — 6360000004 HC RX CONTRAST MEDICATION: Performed by: STUDENT IN AN ORGANIZED HEALTH CARE EDUCATION/TRAINING PROGRAM

## 2018-09-27 PROCEDURE — 83605 ASSAY OF LACTIC ACID: CPT

## 2018-09-27 PROCEDURE — 94640 AIRWAY INHALATION TREATMENT: CPT

## 2018-09-27 PROCEDURE — 96374 THER/PROPH/DIAG INJ IV PUSH: CPT

## 2018-09-27 PROCEDURE — 96376 TX/PRO/DX INJ SAME DRUG ADON: CPT

## 2018-09-27 PROCEDURE — 99285 EMERGENCY DEPT VISIT HI MDM: CPT

## 2018-09-27 PROCEDURE — 85027 COMPLETE CBC AUTOMATED: CPT

## 2018-09-27 PROCEDURE — 96375 TX/PRO/DX INJ NEW DRUG ADDON: CPT

## 2018-09-27 PROCEDURE — 6360000004 HC RX CONTRAST MEDICATION: Performed by: EMERGENCY MEDICINE

## 2018-09-27 RX ORDER — PROMETHAZINE HYDROCHLORIDE 25 MG/ML
INJECTION, SOLUTION INTRAMUSCULAR; INTRAVENOUS
Status: COMPLETED
Start: 2018-09-27 | End: 2018-09-27

## 2018-09-27 RX ORDER — PROMETHAZINE HYDROCHLORIDE 25 MG/ML
12.5 INJECTION, SOLUTION INTRAMUSCULAR; INTRAVENOUS ONCE
Status: COMPLETED | OUTPATIENT
Start: 2018-09-27 | End: 2018-09-27

## 2018-09-27 RX ORDER — BUMETANIDE 1 MG/1
1 TABLET ORAL 2 TIMES DAILY
Status: DISCONTINUED | OUTPATIENT
Start: 2018-09-27 | End: 2018-09-28

## 2018-09-27 RX ORDER — ALBUTEROL SULFATE 90 UG/1
2 AEROSOL, METERED RESPIRATORY (INHALATION) EVERY 6 HOURS PRN
Status: DISCONTINUED | OUTPATIENT
Start: 2018-09-27 | End: 2018-10-04 | Stop reason: HOSPADM

## 2018-09-27 RX ORDER — SODIUM CHLORIDE 0.9 % (FLUSH) 0.9 %
10 SYRINGE (ML) INJECTION EVERY 12 HOURS SCHEDULED
Status: DISCONTINUED | OUTPATIENT
Start: 2018-09-27 | End: 2018-10-04 | Stop reason: HOSPADM

## 2018-09-27 RX ORDER — GABAPENTIN 300 MG/1
300 CAPSULE ORAL 3 TIMES DAILY
Status: DISCONTINUED | OUTPATIENT
Start: 2018-09-27 | End: 2018-10-04 | Stop reason: HOSPADM

## 2018-09-27 RX ORDER — 0.9 % SODIUM CHLORIDE 0.9 %
1000 INTRAVENOUS SOLUTION INTRAVENOUS ONCE
Status: COMPLETED | OUTPATIENT
Start: 2018-09-27 | End: 2018-09-27

## 2018-09-27 RX ORDER — ARMODAFINIL 200 MG/1
200 TABLET ORAL EVERY MORNING
Status: DISCONTINUED | OUTPATIENT
Start: 2018-09-28 | End: 2018-10-04 | Stop reason: HOSPADM

## 2018-09-27 RX ORDER — ONDANSETRON 2 MG/ML
4 INJECTION INTRAMUSCULAR; INTRAVENOUS ONCE
Status: COMPLETED | OUTPATIENT
Start: 2018-09-27 | End: 2018-09-27

## 2018-09-27 RX ORDER — SODIUM CHLORIDE 9 MG/ML
INJECTION, SOLUTION INTRAVENOUS CONTINUOUS
Status: DISCONTINUED | OUTPATIENT
Start: 2018-09-27 | End: 2018-10-01

## 2018-09-27 RX ORDER — PANTOPRAZOLE SODIUM 40 MG/1
40 TABLET, DELAYED RELEASE ORAL
Status: DISCONTINUED | OUTPATIENT
Start: 2018-09-28 | End: 2018-10-04 | Stop reason: HOSPADM

## 2018-09-27 RX ORDER — POLYETHYLENE GLYCOL 3350 17 G/17G
17 POWDER, FOR SOLUTION ORAL DAILY
Status: DISCONTINUED | OUTPATIENT
Start: 2018-09-27 | End: 2018-10-04 | Stop reason: HOSPADM

## 2018-09-27 RX ORDER — ONDANSETRON 2 MG/ML
INJECTION INTRAMUSCULAR; INTRAVENOUS
Status: COMPLETED
Start: 2018-09-27 | End: 2018-09-27

## 2018-09-27 RX ORDER — IPRATROPIUM BROMIDE AND ALBUTEROL SULFATE 2.5; .5 MG/3ML; MG/3ML
1 SOLUTION RESPIRATORY (INHALATION) EVERY 4 HOURS
Status: DISCONTINUED | OUTPATIENT
Start: 2018-09-27 | End: 2018-09-27

## 2018-09-27 RX ORDER — CETIRIZINE HYDROCHLORIDE 10 MG/1
10 TABLET ORAL DAILY
Status: DISCONTINUED | OUTPATIENT
Start: 2018-09-27 | End: 2018-10-04 | Stop reason: HOSPADM

## 2018-09-27 RX ORDER — MORPHINE SULFATE 4 MG/ML
2 INJECTION, SOLUTION INTRAMUSCULAR; INTRAVENOUS
Status: DISCONTINUED | OUTPATIENT
Start: 2018-09-27 | End: 2018-10-04 | Stop reason: HOSPADM

## 2018-09-27 RX ORDER — MORPHINE SULFATE 4 MG/ML
4 INJECTION, SOLUTION INTRAMUSCULAR; INTRAVENOUS
Status: DISCONTINUED | OUTPATIENT
Start: 2018-09-27 | End: 2018-10-04 | Stop reason: HOSPADM

## 2018-09-27 RX ORDER — ACETAMINOPHEN 325 MG/1
650 TABLET ORAL EVERY 4 HOURS PRN
Status: DISCONTINUED | OUTPATIENT
Start: 2018-09-27 | End: 2018-10-04 | Stop reason: HOSPADM

## 2018-09-27 RX ORDER — IPRATROPIUM BROMIDE AND ALBUTEROL SULFATE 2.5; .5 MG/3ML; MG/3ML
1 SOLUTION RESPIRATORY (INHALATION) EVERY 4 HOURS PRN
Status: DISCONTINUED | OUTPATIENT
Start: 2018-09-27 | End: 2018-10-04 | Stop reason: HOSPADM

## 2018-09-27 RX ORDER — SUCRALFATE 1 G/1
1 TABLET ORAL 4 TIMES DAILY
Status: DISCONTINUED | OUTPATIENT
Start: 2018-09-27 | End: 2018-10-04 | Stop reason: HOSPADM

## 2018-09-27 RX ORDER — METOCLOPRAMIDE HYDROCHLORIDE 5 MG/5ML
5 SOLUTION ORAL
Status: DISCONTINUED | OUTPATIENT
Start: 2018-09-27 | End: 2018-10-04 | Stop reason: HOSPADM

## 2018-09-27 RX ORDER — PROMETHAZINE HYDROCHLORIDE 25 MG/ML
12.5 INJECTION, SOLUTION INTRAMUSCULAR; INTRAVENOUS EVERY 6 HOURS PRN
Status: DISCONTINUED | OUTPATIENT
Start: 2018-09-27 | End: 2018-10-04 | Stop reason: HOSPADM

## 2018-09-27 RX ORDER — ONDANSETRON 2 MG/ML
4 INJECTION INTRAMUSCULAR; INTRAVENOUS EVERY 6 HOURS PRN
Status: DISCONTINUED | OUTPATIENT
Start: 2018-09-27 | End: 2018-10-04 | Stop reason: HOSPADM

## 2018-09-27 RX ORDER — METOPROLOL SUCCINATE 50 MG/1
50 TABLET, EXTENDED RELEASE ORAL DAILY
Status: DISCONTINUED | OUTPATIENT
Start: 2018-09-27 | End: 2018-09-28

## 2018-09-27 RX ORDER — CYCLOBENZAPRINE HCL 5 MG
5 TABLET ORAL 3 TIMES DAILY PRN
Status: DISCONTINUED | OUTPATIENT
Start: 2018-09-27 | End: 2018-10-04 | Stop reason: HOSPADM

## 2018-09-27 RX ORDER — ALBUTEROL SULFATE 2.5 MG/3ML
2.5 SOLUTION RESPIRATORY (INHALATION) EVERY 4 HOURS PRN
Status: DISCONTINUED | OUTPATIENT
Start: 2018-09-27 | End: 2018-10-04 | Stop reason: HOSPADM

## 2018-09-27 RX ORDER — POTASSIUM CHLORIDE 20 MEQ/1
20 TABLET, EXTENDED RELEASE ORAL
Status: DISCONTINUED | OUTPATIENT
Start: 2018-09-28 | End: 2018-09-28

## 2018-09-27 RX ORDER — SODIUM CHLORIDE 0.9 % (FLUSH) 0.9 %
10 SYRINGE (ML) INJECTION PRN
Status: DISCONTINUED | OUTPATIENT
Start: 2018-09-27 | End: 2018-10-04 | Stop reason: HOSPADM

## 2018-09-27 RX ORDER — MULTIVITAMIN WITH FOLIC ACID 400 MCG
1 TABLET ORAL DAILY
Status: DISCONTINUED | OUTPATIENT
Start: 2018-09-27 | End: 2018-10-04 | Stop reason: HOSPADM

## 2018-09-27 RX ORDER — SPIRONOLACTONE 25 MG/1
25 TABLET ORAL DAILY
Status: DISCONTINUED | OUTPATIENT
Start: 2018-09-27 | End: 2018-09-28

## 2018-09-27 RX ADMIN — IOHEXOL 50 ML: 240 INJECTION, SOLUTION INTRATHECAL; INTRAVASCULAR; INTRAVENOUS; ORAL at 17:20

## 2018-09-27 RX ADMIN — PROMETHAZINE HYDROCHLORIDE 12.5 MG: 25 INJECTION INTRAMUSCULAR; INTRAVENOUS at 18:29

## 2018-09-27 RX ADMIN — SODIUM CHLORIDE: 9 INJECTION, SOLUTION INTRAVENOUS at 18:30

## 2018-09-27 RX ADMIN — ONDANSETRON 4 MG: 2 INJECTION INTRAMUSCULAR; INTRAVENOUS at 20:48

## 2018-09-27 RX ADMIN — SODIUM CHLORIDE: 9 INJECTION, SOLUTION INTRAVENOUS at 20:41

## 2018-09-27 RX ADMIN — MOMETASONE FUROATE AND FORMOTEROL FUMARATE DIHYDRATE 2 PUFF: 100; 5 AEROSOL RESPIRATORY (INHALATION) at 21:35

## 2018-09-27 RX ADMIN — ONDANSETRON 4 MG: 2 INJECTION INTRAMUSCULAR; INTRAVENOUS at 18:28

## 2018-09-27 RX ADMIN — PROMETHAZINE HYDROCHLORIDE 12.5 MG: 25 INJECTION, SOLUTION INTRAMUSCULAR; INTRAVENOUS at 18:29

## 2018-09-27 RX ADMIN — PROMETHAZINE HYDROCHLORIDE 12.5 MG: 25 INJECTION INTRAMUSCULAR; INTRAVENOUS at 14:35

## 2018-09-27 RX ADMIN — ONDANSETRON 4 MG: 2 INJECTION INTRAMUSCULAR; INTRAVENOUS at 16:28

## 2018-09-27 RX ADMIN — IPRATROPIUM BROMIDE AND ALBUTEROL SULFATE 3 ML: .5; 3 SOLUTION RESPIRATORY (INHALATION) at 21:35

## 2018-09-27 RX ADMIN — SODIUM CHLORIDE 1000 ML: 9 INJECTION, SOLUTION INTRAVENOUS at 16:28

## 2018-09-27 RX ADMIN — SODIUM CHLORIDE 1000 ML: 9 INJECTION, SOLUTION INTRAVENOUS at 14:34

## 2018-09-27 RX ADMIN — IOPAMIDOL 75 ML: 755 INJECTION, SOLUTION INTRAVENOUS at 17:19

## 2018-09-27 NOTE — ED NOTES
Pt continues to have episodes of emesis.  Resident informed      Maria Isabel Perales RN  09/27/18 1898

## 2018-09-27 NOTE — ED PROVIDER NOTES
[facility]  ED  Emergency Department Encounter  Emergency Medicine Resident     Pt Name: Aneudy Verma  MRN: [id number]  [place] 1945  Date of evaluation: 9/27/18  PCP:  Sonu Simmons, [address] [place] [address]       Chief Complaint   Patient presents with    Nausea     n/v x2 days, denies abdominal pain, states took zofran at 80 with no relief    Fever     102F fever x1 day, states took tylenol at 0700, afebrile during triage       HISTORY OF PRESENT ILLNESS  (Location/Symptom, Timing/Onset, Context/Setting, Quality, Duration, Modifying Factors, Severity.)      Aneudy Verma is a 71 y.o. male who presents with  Nausea and vomiting for 2 days. Patient has a surgical history of gastric bypass performed approximately 25 years ago. Patient has had recent stricture with his bypass site as well as a history of bowel obstruction over the past year. Patient had a recent upper endoscopy performed approximately one month ago here at [address] by Dr. Dayo Wood to remove food particles and stricture site. Patient was told to remain on a liquid diet. Patient admits to eating scrambled eggs and gravy yesterday, and has since thrown up multiple times. Patient describes vomitus dark brown in color. Since yesterday evening. She has not tolerated by mouth intake including any fluids today. Patient denies chest pain, shortness of breath, abdominal pain. Patient has no change in bowel or bladder habits. Denies rectal bleeding. Patient also complains of flulike symptoms for 7 days with a productive cough producing scant yellow mucus. Patient was febrile yesterday evening, with reported temperature of 103 orally. Patient is taking Tylenol a.m. this morning. Patient also has headache, muscle aches, sore throat. PAST MEDICAL / SURGICAL / SOCIAL / FAMILY HISTORY      has a past medical history of Asthma; Atrial fibrillation ([address] GERD (gastroesophageal reflux disease);  Hyperlipidemia; TABLET BY MOUTH DAILY AT BEDTIME AS NEEDED 8/23/18   Nirmal Gillespie PA-C   metoprolol succinate (TOPROL XL) 50 MG extended release tablet TAKE 1 TABLET BY MOUTH DAILY 8/23/18   Nirmal Gillespie PA-C   bumetanide (BUMEX) 1 MG tablet Take 1 tablet by mouth 2 times daily 8/19/18   Ivette Parra MD   metoclopramide (REGLAN) 5 MG/5ML solution Take 5 mLs by mouth 4 times daily (before meals and nightly)  Patient taking differently: Take 5 mg by mouth 4 times daily (before meals and nightly)  8/19/18   Ivette Parra MD   sucralfate (CARAFATE) 1 GM tablet Take 1 tablet by mouth 4 times daily 8/19/18   Ivette Parra MD   Incontinence Supply Disposable (INCONTINENCE BRIEF LARGE) MISC To use as directed. Use 3x a day 6/20/18   Nirmal Gillespie PA-C   polyethylene glycol [facility]) powder Take 17 g by mouth daily 6/20/18   Nirmal Gillespie PA-C   rivaroxaban (XARELTO) 20 MG TABS tablet Take 1 tablet by mouth daily (with breakfast) 6/20/18   Nirmal Gillespie PA-C   potassium chloride (KLOR-CON M20) 20 MEQ extended release tablet TAKE 3 TABLETS BY MOUTH TWICE A DAY 6/20/18   Nirmal Gillespie PA-C   gabapentin (NEURONTIN) 300 MG capsule TAKE ONE CAPSULE BY MOUTH 3 TIMES A DAY. 6/20/18 12/17/18  Nirmal Gillespie PA-C   albuterol (PROVENTIL) (2.5 MG/3ML) 0.083% nebulizer solution Take 3 mLs by nebulization every 4 hours as needed for Wheezing 6/20/18   Nirmal Gillespie PA-C   ondansetron (ZOFRAN-ODT) 4 MG disintegrating tablet Take 4 mg by mouth 4/29/18   Historical Provider, MD   ipratropium-albuterol (DUONEB) 0.5-2.5 (3) MG/3ML SOLN nebulizer solution Inhale 1 vial into the lungs every 4 hours    Historical Provider, MD   Armodafinil 200 MG TABS Take 1 tablet by mouth every morning.  . 4/2/18   Historical Provider, MD   ciclopirox (PENLAC) 8 % solution Apply topically nightly to nails 12/1/17   Real Carbajal MD   SYMBICORT 160-4.5 MCG/ACT AERO  10/9/17   Historical Provider, MD   albuterol (PROVENTIL) 2 MG tablet TAKE 1 TABLET BY MOUTH 3 TIMES A DAY 7/27/17   Historical Provider, MD   albuterol (PROVENTIL HFA;VENTOLIN HFA) 108 (90 BASE) MCG/ACT inhaler Inhale 2 puffs into the lungs every 6 hours as needed for Wheezing. Historical Provider, MD       REVIEW OF SYSTEMS    (2-9 systems for level 4, 10 or more for level 5)      General ROS - Positive fevers, No malaise   Ophthalmic ROS - No discharge, No changes in vision  ENT ROS -  positive sore throat, No rhinorrhea,   Respiratory ROS - no shortness of breath, positive cough, no  wheezing  Cardiovascular ROS - No chest pain, no dyspnea on exertion  Gastrointestinal ROS - No abdominal pain, positive nausea / vomiting, no change in bowel habits, no black or bloody stools  Genito-Urinary ROS - No dysuria, trouble voiding, positive chronic intermittent hematuria  Musculoskeletal ROS - No myalgias, No arthalgias  Neurological ROS - No headache, no dizziness/lightheadedness, No focal weakness, no loss of sensation  Dermatological ROS - No lesions, No rash         PHYSICAL EXAM   (up to 7 for level 4, 8 or more for level 5)      INITIAL VITALS:   BP (!) 100/58   Pulse 84   Temp 97.5 °F (36.4 °C) (Oral)   Resp 18   Wt 235 lb (106.6 kg)   SpO2 94%   BMI 33.72 kg/m²     General Appearance: Well-appearing, well-nourished, in no acute distress  HEENT: Head: normocephalic/atraumatic eyes: PERRLA, EOMT, conjunctiva not injected, sclerae nonicteric ears: External canals patent nose: Nares patent, no rhinorrhea, throat: Appropriate dentition, mucous membranes moist, oropharynx clear without erythema or evidence of swelling, no tonsillar exudate  Neck: Supple, full range of motion, no pain. Trachea midline, no JVD. Lungs: No evidence of increased work of breathing. CTA B/L, no wheezes/rhonchi   Cardiovascular: RRR, no murmur, 2+ peripheral pulses bilaterally. Cap refill less than 2 seconds. Mild bilateral non- pitting edema noted  Abdomen: Soft, nontender.   No guarding NREQ    Yeast, UA NOT REPORTED NONE   POCT troponin   Result Value Ref Range    POC Troponin I 0.03 0.00 - 0.10 ng/mL    POC Troponin Interp       The Troponin-I (POC) results cannot be compared to the Troponin-T results. EKG 12 Lead   Result Value Ref Range    Ventricular Rate 89 BPM    Atrial Rate 100 BPM    QRS Duration 124 ms    Q-T Interval 406 ms    QTc Calculation (Bazett) 493 ms    R Axis 147 degrees    T Axis -12 degrees       IMPRESSION:     RADIOLOGY:  Ct Abdomen Pelvis W Iv Contrast    Result Date: 9/27/2018  EXAMINATION: CT OF THE ABDOMEN AND PELVIS WITH CONTRAST 9/27/2018 5:25 pm TECHNIQUE: CT of the abdomen and pelvis was performed with the administration of intravenous contrast. Multiplanar reformatted images are provided for review. Dose modulation, iterative reconstruction, and/or weight based adjustment of the mA/kV was utilized to reduce the radiation dose to as low as reasonably achievable. COMPARISON: CT abdomen and pelvis, 05/24/2018 HISTORY: ORDERING SYSTEM PROVIDED HISTORY: NAUSEA, VOMITING TECHNOLOGIST PROVIDED HISTORY: Oral and IV Contrast Per doctor Derrick Hernandez with [facility], please have pt take additional sip of oral contrast just prior to scan to attempt to visualize passage through anastomosis site of bypass FINDINGS: Lower Chest: Patchy foci of airspace disease are noted in the lung bases, including middle lobe. There is no pleural effusion. Coronary artery calcification and mild cardiomegaly are present. Organs: The liver is homogeneous, mildly decreased in attenuation. Multiple stones are layered in the gallbladder. The pancreas is atrophic. The spleen and adrenal glands are unremarkable. GI/Bowel: Gastric stapling is noted. The proximal stomach is distended with oral contrast.  At the distal gastric channel there is minimal passage of oral contrast (at series 2, image number 88).   In the bypassed portion of the stomach, there is mild amount of intraluminal content and gas (in the expected range). The duodenum and small bowel are in collapsed states. There is diverticulosis of the sigmoid colon. No acute inflammatory changes are seen. The mesenteric arteries enhance in normal fashion. Pelvis: The urinary bladder and prostate gland are unremarkable. Peritoneum/Retroperitoneum: The kidneys enhance symmetrically. There are cortical cysts measuring up to 2 cm. No suspicious abnormality or follow-up is recommended. There is no hydronephrosis. There is no adenopathy. There is moderate aortoiliac calcification. No aneurysm is present. Bones/Soft Tissues: There is moderate multilevel spondylosis of the lumbar spine. The abdominal wall is unremarkable. The proximal stomach is distended with oral contrast, with narrowing at the distal gastric staple line, of uncertain significance. Functional versus structural stenosis. Nuclear medicine gastric emptying study or upper GI series may be helpful. Bibasilar atelectasis versus pneumonia or aspiration pneumonitis. Cholelithiasis. Mild fatty infiltration of the liver. Xr Chest Portable    Result Date: 9/27/2018  EXAMINATION: SINGLE XRAY VIEW OF THE CHEST 9/27/2018 2:28 pm COMPARISON: 08/12/2018 HISTORY: ORDERING SYSTEM PROVIDED HISTORY: cough fever TECHNOLOGIST PROVIDED HISTORY: cough fever FINDINGS: Stable cardiomediastinal contours. Chronically elevated right hemidiaphragm also unchanged. No focal airspace consolidation. No pneumothorax or pleural effusion. No acute findings or significant interval change. Xr Abdomen (2 Views)    Result Date: 9/27/2018  EXAMINATION: 4 XRAY VIEWS OF THE ABDOMEN 9/27/2018 2:31 pm COMPARISON: None. HISTORY: ORDERING SYSTEM PROVIDED HISTORY: r/o obstruction TECHNOLOGIST PROVIDED HISTORY: r/o obstruction FINDINGS: Suture material projects over the left upper quadrant. Small bowel and colon have scattered bowel gas, without significant distention.   There is a moderate amount of stool projecting

## 2018-09-28 PROCEDURE — 94640 AIRWAY INHALATION TREATMENT: CPT

## 2018-09-28 PROCEDURE — 6370000000 HC RX 637 (ALT 250 FOR IP): Performed by: STUDENT IN AN ORGANIZED HEALTH CARE EDUCATION/TRAINING PROGRAM

## 2018-09-28 PROCEDURE — 96375 TX/PRO/DX INJ NEW DRUG ADDON: CPT

## 2018-09-28 PROCEDURE — 1200000000 HC SEMI PRIVATE

## 2018-09-28 PROCEDURE — 2500000003 HC RX 250 WO HCPCS: Performed by: NURSE PRACTITIONER

## 2018-09-28 PROCEDURE — 99222 1ST HOSP IP/OBS MODERATE 55: CPT | Performed by: SURGERY

## 2018-09-28 PROCEDURE — 2580000003 HC RX 258: Performed by: STUDENT IN AN ORGANIZED HEALTH CARE EDUCATION/TRAINING PROGRAM

## 2018-09-28 PROCEDURE — 2700000000 HC OXYGEN THERAPY PER DAY

## 2018-09-28 PROCEDURE — 99222 1ST HOSP IP/OBS MODERATE 55: CPT | Performed by: INTERNAL MEDICINE

## 2018-09-28 PROCEDURE — 6370000000 HC RX 637 (ALT 250 FOR IP): Performed by: EMERGENCY MEDICINE

## 2018-09-28 RX ORDER — METOPROLOL TARTRATE 5 MG/5ML
2.5 INJECTION INTRAVENOUS EVERY 6 HOURS
Status: DISCONTINUED | OUTPATIENT
Start: 2018-09-28 | End: 2018-10-01

## 2018-09-28 RX ADMIN — MOMETASONE FUROATE AND FORMOTEROL FUMARATE DIHYDRATE 2 PUFF: 100; 5 AEROSOL RESPIRATORY (INHALATION) at 09:57

## 2018-09-28 RX ADMIN — CETIRIZINE HYDROCHLORIDE 10 MG: 10 TABLET ORAL at 09:20

## 2018-09-28 RX ADMIN — SUCRALFATE 1 G: 1 TABLET ORAL at 15:14

## 2018-09-28 RX ADMIN — METOPROLOL TARTRATE 2.5 MG: 1 INJECTION, SOLUTION INTRAVENOUS at 15:16

## 2018-09-28 RX ADMIN — THERA TABS 1 TABLET: TAB at 15:13

## 2018-09-28 RX ADMIN — MOMETASONE FUROATE AND FORMOTEROL FUMARATE DIHYDRATE 2 PUFF: 100; 5 AEROSOL RESPIRATORY (INHALATION) at 20:39

## 2018-09-28 RX ADMIN — Medication 10 ML: at 17:00

## 2018-09-28 RX ADMIN — METOCLOPRAMIDE HYDROCHLORIDE 5 MG: 5 SOLUTION ORAL at 15:16

## 2018-09-28 RX ADMIN — DOXYLAMINE SUCCINATE 25 MG: 25 TABLET ORAL at 21:36

## 2018-09-28 RX ADMIN — ACETAMINOPHEN 650 MG: 325 TABLET ORAL at 09:20

## 2018-09-28 RX ADMIN — GABAPENTIN 300 MG: 300 CAPSULE ORAL at 20:42

## 2018-09-28 RX ADMIN — PANTOPRAZOLE SODIUM 40 MG: 40 TABLET, DELAYED RELEASE ORAL at 15:12

## 2018-09-28 RX ADMIN — POLYETHYLENE GLYCOL 3350 17 G: 17 POWDER, FOR SOLUTION ORAL at 15:17

## 2018-09-28 RX ADMIN — GABAPENTIN 300 MG: 300 CAPSULE ORAL at 15:14

## 2018-09-28 RX ADMIN — ALBUTEROL SULFATE 2 PUFF: 90 AEROSOL, METERED RESPIRATORY (INHALATION) at 20:38

## 2018-09-28 RX ADMIN — METOCLOPRAMIDE HYDROCHLORIDE 5 MG: 5 SOLUTION ORAL at 20:43

## 2018-09-28 RX ADMIN — SUCRALFATE 1 G: 1 TABLET ORAL at 20:42

## 2018-09-28 RX ADMIN — CYCLOBENZAPRINE HYDROCHLORIDE 5 MG: 5 TABLET, FILM COATED ORAL at 20:42

## 2018-09-28 ASSESSMENT — PAIN SCALES - GENERAL
PAINLEVEL_OUTOF10: 2
PAINLEVEL_OUTOF10: 0
PAINLEVEL_OUTOF10: 2

## 2018-09-28 ASSESSMENT — PAIN DESCRIPTION - LOCATION: LOCATION: LEG;TOE (COMMENT WHICH ONE)

## 2018-09-28 ASSESSMENT — PAIN DESCRIPTION - ORIENTATION: ORIENTATION: RIGHT;LEFT

## 2018-09-28 NOTE — CONSULTS
(KLOR-CON M20) 20 MEQ extended release tablet TAKE 3 TABLETS BY MOUTH TWICE A DAY 6/20/18   Odette Truong PA-C   gabapentin (NEURONTIN) 300 MG capsule TAKE ONE CAPSULE BY MOUTH 3 TIMES A DAY. 6/20/18 12/17/18  Odette Truong PA-C   albuterol (PROVENTIL) (2.5 MG/3ML) 0.083% nebulizer solution Take 3 mLs by nebulization every 4 hours as needed for Wheezing 6/20/18   Odette Truong PA-C   ondansetron (ZOFRAN-ODT) 4 MG disintegrating tablet Take 4 mg by mouth 4/29/18   Historical Provider, MD   ipratropium-albuterol (DUONEB) 0.5-2.5 (3) MG/3ML SOLN nebulizer solution Inhale 1 vial into the lungs every 4 hours    Historical Provider, MD   Armodafinil 200 MG TABS Take 1 tablet by mouth every morning. . 4/2/18   Historical Provider, MD   ciclopirox (PENLAC) 8 % solution Apply topically nightly to nails 12/1/17   Jose Truong MD   SYMBICORT 160-4.5 MCG/ACT AERO  10/9/17   Historical Provider, MD   albuterol (PROVENTIL) 2 MG tablet TAKE 1 TABLET BY MOUTH 3 TIMES A DAY 7/27/17   Historical Provider, MD   albuterol (PROVENTIL HFA;VENTOLIN HFA) 108 (90 BASE) MCG/ACT inhaler Inhale 2 puffs into the lungs every 6 hours as needed for Wheezing.     Historical Provider, MD       CURRENT MEDICATIONS:  Scheduled Meds:   sodium chloride flush  10 mL Intravenous 2 times per day    mometasone-formoterol  2 puff Inhalation BID    Armodafinil  200 mg Mouth/Throat QAM    polyethylene glycol  17 g Oral Daily    rivaroxaban  20 mg Oral Daily with breakfast    potassium chloride  20 mEq Oral Daily with breakfast    gabapentin  300 mg Oral TID    bumetanide  1 mg Oral BID    metoclopramide  5 mg Oral 4x Daily AC & HS    sucralfate  1 g Oral 4x Daily    cetirizine  10 mg Oral Daily    spironolactone  25 mg Oral Daily    metoprolol succinate  50 mg Oral Daily    pantoprazole  40 mg Oral QAM AC    multivitamin  1 tablet Oral Daily     Continuous Infusions:   sodium chloride 120 mL/hr at 09/27/18 1830    sodium banding gastroplasty 1985 and gastric stricture persistent nausea and vomiting. CT abdomen and pelvis shows narrowing of the distal gastric staple line. 2. HX of A fib on Xarelto - Last dose yesterday    PLAN   1. Continue NPO  2. Continue to hold AC  3. Patient plan for EGD with dilation Monday. Will need AC held and managed per Cardiology  4. Please call GI with any questions, concerns or acute change in clinical status      This plan was formulated in collaboration with Dr. Jessie Miguel  Thank you for allowing us to participate in the care of your patient.     Electronically signed by: Dulce MG on 9/28/2018 at 6:14 AM

## 2018-09-28 NOTE — CONSULTS
otherwise clear  Cardiovascular: S1S2, irregular, negative murmur, negative gallop, negative JVD, negative carotid bruit bilaterally  Abdomen:BS present, soft, midepigastric tenderness  Extremities: Mild peripheral edema L >R    Labs:   CBC with Differential:    Lab Results   Component Value Date    WBC 13.0 09/27/2018    RBC 3.97 09/27/2018    RBC 4.39 09/13/2011    HGB 11.6 09/27/2018    HCT 37.6 09/27/2018     09/27/2018     09/13/2011    MCV 94.7 09/27/2018    MCH 29.2 09/27/2018    MCHC 30.9 09/27/2018    RDW 16.1 09/27/2018    LYMPHOPCT 18 08/19/2018    LYMPHOPCT 21.2 10/30/2013    MONOPCT 13 08/19/2018    MONOPCT 7.9 10/30/2013    EOSPCT 7.9 10/30/2013    BASOPCT 0 08/19/2018    BASOPCT 0.7 10/30/2013    MONOSABS 0.60 08/19/2018    MONOSABS 0.5 10/30/2013    LYMPHSABS 0.85 08/19/2018    LYMPHSABS 1.4 10/30/2013    EOSABS 0.32 08/19/2018    EOSABS 0.5 10/30/2013    BASOSABS <0.03 08/19/2018    DIFFTYPE NOT REPORTED 08/19/2018     CMP:    Lab Results   Component Value Date     09/27/2018    K 4.5 09/27/2018     09/27/2018    CO2 28 09/27/2018    BUN 15 09/27/2018    CREATININE 0.77 09/27/2018    GFRAA >60 09/27/2018    LABGLOM >60 09/27/2018    GLUCOSE 125 09/27/2018    GLUCOSE 99 09/13/2011    PROT 6.9 09/27/2018    LABALBU 3.5 09/27/2018    CALCIUM 9.4 09/27/2018    BILITOT 1.03 09/27/2018    ALKPHOS 103 09/27/2018    AST 23 09/27/2018    ALT 11 09/27/2018         CXR: No acute findings or significant interval change    EKG:Atrial fibrillation. RBBB    A/P: 1. Atrial fibrillation  -Vrate controlled. Will place on IV Metoprolol until able to take PO. Ok to hold Xarelto for EGD. Will review need to bridge with Heparin    2. HTN  -Borderline hypotensive. Will hold Bumex and Aldactone while NPO. Decrease IVF to 75 to avoid CHF. Will discuss with Dr. Cheri Chen    Discussed with Dr. Cheri Chen. Ok to hold Xarelto. No need for Heparin bridge.      Electronically signed by JULIAN Laresn

## 2018-09-28 NOTE — PROGRESS NOTES
Hand off to [facility] 2. Pt. Transferred to [facility], . 2012. Pt. Transferred with all his belongings via [address] and wheelchair. Pt. Axox4, IV clean,dry and intack, Heplocked. Transferred with all floor medication.

## 2018-09-28 NOTE — H&P
[facility]  CDU / OBSERVATION eNCOUnter  Resident Note     Pt Name: Jermaine Earl  MRN: [id number]  [place] 1945  Date of evaluation: 9/28/18  Patient's PCP is :  Lalita Ambrosio PA-C    [address] [place] [address]       Chief Complaint   Patient presents with    Nausea     n/v x2 days, denies abdominal pain, states took zofran at 80 with no relief    Fever     102F fever x1 day, states took tylenol at 0700, afebrile during triage         [facility]    Jermaine Earl is a 71 y.o. male who presents with acute onset of generalized weakness accompanied with acute on chronic nausea and vomiting that started on Wednesday evening related to gastric stricture secondary to previous gastric surgery and existing ulcer. Pt is s/p endoscopic gastric dilation w/ incomplete stricture resolution  . Pt was at PT office yesterday and felt weak, deciding to attend ED. He had trialed eating a blended tuna and brody mix nstead of recommended liquid diet and fell ill since. Has tried reglan at home for nausea. General surgery has been consulted on the case in the ED. Reports that last BM was Wednesday morning. He reports having had chills earlier in the week. Pt has been on xarelto for afib. Overnight: pt says he did not vomit overnight, but is still nauseated with minor chronic, epigastric, sore abdominal pain due to vomiting. Pt is on cpap at home but did not bring his machine. Will ask for one today if he stays overnight.     REVIEW OF SYSTEMS       General: no fever, chills, malaise  HEENT: no eye discharge, no change in vision, no sore throat, no rhinorrhea, no difficulty swallowing  Neck: no lymphadenopathy, no swelling, no tracheal deviation  Pulmonary: no shortness of breath, no cough, no wheezing  Cardiovascular: no chest pain, no palpitation, no exertional dyspnea  GI: Patient is currently experiencing mild epigastric abdominal pain nausea, vomiting, black or bloody BMs  : no dysuria, hematuria, testicular/vaginal pain  MSK: no myalgia, arthralgia, or decreased range of motion  Neuro: Pt complains of mild headache, dizziness, syncope, focal weakness, or loss of sensation  Skin: No rash or lesions. (PQRS) Advance directives on face sheet per hospital policy. No change unless specifically mentioned in chart    PAST MEDICAL HISTORY    has a past medical history of Asthma; Atrial fibrillation ([address] GERD (gastroesophageal reflux disease); Hyperlipidemia; Hypertension; Obesity; Osteoarthritis; and Unspecified sleep apnea. I have reviewed the past medical history with the patient and it is pertinent to this complaint. 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 I have reviewed and agree with Surgical History entered and it is pertinent to this complaint.      CURRENT MEDICATIONS         0.9 % sodium chloride infusion Continuous   sodium chloride flush 0.9 % injection 10 mL 2 times per day   sodium chloride flush 0.9 % injection 10 mL PRN   0.9 % sodium chloride infusion Continuous   morphine (PF) injection 2 mg Q2H PRN   Or    morphine (PF) injection 4 mg Q2H PRN   acetaminophen (TYLENOL) tablet 650 mg Q4H PRN   ondansetron (ZOFRAN) injection 4 mg Q6H PRN   promethazine (PHENERGAN) injection 12.5 mg Q6H PRN   albuterol sulfate  (90 Base) MCG/ACT inhaler 2 puff Q6H PRN   albuterol (PROVENTIL) tablet 2 mg TID PRN   mometasone-formoterol to reduce the radiation dose to as low as reasonably achievable. COMPARISON: CT abdomen and pelvis, 05/24/2018 HISTORY: ORDERING SYSTEM PROVIDED HISTORY: NAUSEA, VOMITING TECHNOLOGIST PROVIDED HISTORY: Oral and IV Contrast Per doctor Harshal Ramirez with [facility], please have pt take additional sip of oral contrast just prior to scan to attempt to visualize passage through anastomosis site of bypass FINDINGS: Lower Chest: Patchy foci of airspace disease are noted in the lung bases, including middle lobe. There is no pleural effusion. Coronary artery calcification and mild cardiomegaly are present. Organs: The liver is homogeneous, mildly decreased in attenuation. Multiple stones are layered in the gallbladder. The pancreas is atrophic. The spleen and adrenal glands are unremarkable. GI/Bowel: Gastric stapling is noted. The proximal stomach is distended with oral contrast.  At the distal gastric channel there is minimal passage of oral contrast (at series 2, image number 88). In the bypassed portion of the stomach, there is mild amount of intraluminal content and gas (in the expected range). The duodenum and small bowel are in collapsed states. There is diverticulosis of the sigmoid colon. No acute inflammatory changes are seen. The mesenteric arteries enhance in normal fashion. Pelvis: The urinary bladder and prostate gland are unremarkable. Peritoneum/Retroperitoneum: The kidneys enhance symmetrically. There are cortical cysts measuring up to 2 cm. No suspicious abnormality or follow-up is recommended. There is no hydronephrosis. There is no adenopathy. There is moderate aortoiliac calcification. No aneurysm is present. Bones/Soft Tissues: There is moderate multilevel spondylosis of the lumbar spine. The abdominal wall is unremarkable. The proximal stomach is distended with oral contrast, with narrowing at the distal gastric staple line, of uncertain significance. Functional versus structural stenosis. Lexi  -Will discuss cessation of Xarelto  Patient has normal heart rate without rate or rhythm control medications      · Continue home medications and pain control  · Monitor vitals, labs, and imaging  · DISPO: pending consults and clinical improvement    CONSULTS:    IP CONSULT TO BARIATRICS  IP CONSULT TO BARIATRICS  IP CONSULT TO GI    PROCEDURES:  Not indicated       PATIENT REFERRED TO:    ADELINE Shay Cha  [phone]            --  Nader Morel MD   Emergency Medicine Resident     This dictation was generated by voice recognition computer software. Although all attempts are made to edit the dictation for accuracy, there may be errors in the transcription that are not intended.

## 2018-09-29 PROCEDURE — 99222 1ST HOSP IP/OBS MODERATE 55: CPT | Performed by: INTERNAL MEDICINE

## 2018-09-29 PROCEDURE — 94762 N-INVAS EAR/PLS OXIMTRY CONT: CPT

## 2018-09-29 PROCEDURE — 6360000002 HC RX W HCPCS: Performed by: STUDENT IN AN ORGANIZED HEALTH CARE EDUCATION/TRAINING PROGRAM

## 2018-09-29 PROCEDURE — 2500000003 HC RX 250 WO HCPCS: Performed by: NURSE PRACTITIONER

## 2018-09-29 PROCEDURE — 99232 SBSQ HOSP IP/OBS MODERATE 35: CPT | Performed by: INTERNAL MEDICINE

## 2018-09-29 PROCEDURE — 1200000000 HC SEMI PRIVATE

## 2018-09-29 PROCEDURE — 94640 AIRWAY INHALATION TREATMENT: CPT

## 2018-09-29 PROCEDURE — 6370000000 HC RX 637 (ALT 250 FOR IP): Performed by: STUDENT IN AN ORGANIZED HEALTH CARE EDUCATION/TRAINING PROGRAM

## 2018-09-29 PROCEDURE — 2580000003 HC RX 258: Performed by: STUDENT IN AN ORGANIZED HEALTH CARE EDUCATION/TRAINING PROGRAM

## 2018-09-29 RX ORDER — DIPHENHYDRAMINE HCL 25 MG
25 TABLET ORAL NIGHTLY PRN
Status: DISCONTINUED | OUTPATIENT
Start: 2018-09-29 | End: 2018-10-04 | Stop reason: HOSPADM

## 2018-09-29 RX ORDER — DIPHENHYDRAMINE HCL 25 MG
25 TABLET ORAL NIGHTLY PRN
Status: DISCONTINUED | OUTPATIENT
Start: 2018-09-30 | End: 2018-09-29

## 2018-09-29 RX ADMIN — MOMETASONE FUROATE AND FORMOTEROL FUMARATE DIHYDRATE 2 PUFF: 100; 5 AEROSOL RESPIRATORY (INHALATION) at 20:52

## 2018-09-29 RX ADMIN — GABAPENTIN 300 MG: 300 CAPSULE ORAL at 14:18

## 2018-09-29 RX ADMIN — SUCRALFATE 1 G: 1 TABLET ORAL at 14:18

## 2018-09-29 RX ADMIN — SUCRALFATE 1 G: 1 TABLET ORAL at 10:06

## 2018-09-29 RX ADMIN — METOCLOPRAMIDE HYDROCHLORIDE 5 MG: 5 SOLUTION ORAL at 21:01

## 2018-09-29 RX ADMIN — PANTOPRAZOLE SODIUM 40 MG: 40 TABLET, DELAYED RELEASE ORAL at 10:07

## 2018-09-29 RX ADMIN — Medication 10 ML: at 10:07

## 2018-09-29 RX ADMIN — MOMETASONE FUROATE AND FORMOTEROL FUMARATE DIHYDRATE 2 PUFF: 100; 5 AEROSOL RESPIRATORY (INHALATION) at 07:36

## 2018-09-29 RX ADMIN — ALBUTEROL SULFATE 2.5 MG: 2.5 SOLUTION RESPIRATORY (INHALATION) at 18:08

## 2018-09-29 RX ADMIN — ALBUTEROL SULFATE 2 PUFF: 90 AEROSOL, METERED RESPIRATORY (INHALATION) at 20:52

## 2018-09-29 RX ADMIN — CETIRIZINE HYDROCHLORIDE 10 MG: 10 TABLET ORAL at 10:07

## 2018-09-29 RX ADMIN — GABAPENTIN 300 MG: 300 CAPSULE ORAL at 21:02

## 2018-09-29 RX ADMIN — METOCLOPRAMIDE HYDROCHLORIDE 5 MG: 5 SOLUTION ORAL at 10:07

## 2018-09-29 RX ADMIN — GABAPENTIN 300 MG: 300 CAPSULE ORAL at 10:07

## 2018-09-29 RX ADMIN — METOPROLOL TARTRATE 2.5 MG: 1 INJECTION, SOLUTION INTRAVENOUS at 01:02

## 2018-09-29 RX ADMIN — SUCRALFATE 1 G: 1 TABLET ORAL at 21:02

## 2018-09-29 RX ADMIN — THERA TABS 1 TABLET: TAB at 10:07

## 2018-09-29 RX ADMIN — METOCLOPRAMIDE HYDROCHLORIDE 5 MG: 5 SOLUTION ORAL at 19:00

## 2018-09-29 RX ADMIN — Medication 10 ML: at 21:01

## 2018-09-29 RX ADMIN — POLYETHYLENE GLYCOL 3350 17 G: 17 POWDER, FOR SOLUTION ORAL at 10:06

## 2018-09-29 ASSESSMENT — ENCOUNTER SYMPTOMS
DOUBLE VISION: 0
ABDOMINAL PAIN: 1
SHORTNESS OF BREATH: 0
COUGH: 0
BLURRED VISION: 0
NAUSEA: 1
VOMITING: 1
BACK PAIN: 1
SPUTUM PRODUCTION: 0
HEMOPTYSIS: 1
BLOOD IN STOOL: 0

## 2018-09-29 NOTE — PROGRESS NOTES
No suspicious abnormality or follow-up is recommended. There is no hydronephrosis. There is no adenopathy. There is moderate aortoiliac calcification. No aneurysm is present. Bones/Soft Tissues: There is moderate multilevel spondylosis of the lumbar spine. The abdominal wall is unremarkable. The proximal stomach is distended with oral contrast, with narrowing at the distal gastric staple line, of uncertain significance. Functional versus structural stenosis. Nuclear medicine gastric emptying study or upper GI series may be helpful. Bibasilar atelectasis versus pneumonia or aspiration pneumonitis. Cholelithiasis. Mild fatty infiltration of the liver. Xr Chest Portable    Result Date: 9/27/2018  EXAMINATION: SINGLE XRAY VIEW OF THE CHEST 9/27/2018 2:28 pm COMPARISON: 08/12/2018 HISTORY: ORDERING SYSTEM PROVIDED HISTORY: cough fever TECHNOLOGIST PROVIDED HISTORY: cough fever FINDINGS: Stable cardiomediastinal contours. Chronically elevated right hemidiaphragm also unchanged. No focal airspace consolidation. No pneumothorax or pleural effusion. No acute findings or significant interval change. Xr Abdomen (2 Views)    Result Date: 9/27/2018  EXAMINATION: 4 XRAY VIEWS OF THE ABDOMEN 9/27/2018 2:31 pm COMPARISON: None. HISTORY: ORDERING SYSTEM PROVIDED HISTORY: r/o obstruction TECHNOLOGIST PROVIDED HISTORY: r/o obstruction FINDINGS: Suture material projects over the left upper quadrant. Small bowel and colon have scattered bowel gas, without significant distention. There is a moderate amount of stool projecting over the right colon. There is no free air identified. Possible constipation with prominence of stool in the ascending colon. No obstruction is evident. Pattern is overall nonspecific. Assessment:     3 81-year-old male with a history of vertical banding gastroplasty 1985 and gastric stricture persistent nausea and vomiting.   CT abdomen and pelvis shows narrowing of the

## 2018-09-29 NOTE — H&P
[facility]       HISTORY AND PHYSICAL EXAMINATION            Date:   9/29/2018  Patient name:  Lois Cage  Date of admission:  9/27/2018  1:37 PM  MRN:   [id number]  Account:  [de-identified]  YOB: 1945  PCP:    Eve Calzada PA-C  Room:   2012/2012-01  Code Status:    Full Code    Chief Complaint:     Chief Complaint   Patient presents with    Nausea     n/v x2 days, denies abdominal pain, states took zofran at 80 with no relief    Fever     102F fever x1 day, states took tylenol at 0700, afebrile during triage       History Obtained From:     patient, electronic medical record    History of Present Illness: The patient is a 71 y.o. male who presents with:   Nausea (n/v x2 days, denies abdominal pain, states took zofran at 1100 with no relief) and Fever (102F fever x1 day, states took tylenol at 0700, afebrile during triage)       Patient was admitted thru ER with:  Jessica Oconnor is a 71 y.o. male who presents with  Nausea and vomiting for 2 days. Patient has a surgical history of gastric bypass performed approximately 25 years ago. Patient has had recent stricture with his bypass site as well as a history of bowel obstruction over the past year. Patient had a recent upper endoscopy performed approximately one month ago here at [facility] by Dr. Josselin Romero to remove food particles and stricture site. Patient was told to remain on a liquid diet. Patient admits to eating scrambled eggs and gravy yesterday, and has since thrown up multiple times. Patient describes vomitus dark brown in color. Since yesterday evening. She has not tolerated by mouth intake including any fluids today. Patient denies chest pain, shortness of breath, abdominal pain. Patient has no change in bowel or bladder habits.   Denies rectal bleeding.   Patient also complains of flulike symptoms for 7 days with a productive cough producing scant

## 2018-09-29 NOTE — PROGRESS NOTES
09/27/2018     09/27/2018    CO2 28 09/27/2018    BUN 15 09/27/2018    CREATININE 0.77 09/27/2018    GFRAA >60 09/27/2018    LABGLOM >60 09/27/2018    GLUCOSE 125 09/27/2018    GLUCOSE 99 09/13/2011    PROT 6.9 09/27/2018    LABALBU 3.5 09/27/2018    CALCIUM 9.4 09/27/2018    BILITOT 1.03 09/27/2018    ALKPHOS 103 09/27/2018    AST 23 09/27/2018    ALT 11 09/27/2018       Radiology Review:  Ct Abdomen Pelvis W Iv Contrast    Result Date: 9/27/2018  EXAMINATION: CT OF THE ABDOMEN AND PELVIS WITH CONTRAST 9/27/2018 5:25 pm TECHNIQUE: CT of the abdomen and pelvis was performed with the administration of intravenous contrast. Multiplanar reformatted images are provided for review. Dose modulation, iterative reconstruction, and/or weight based adjustment of the mA/kV was utilized to reduce the radiation dose to as low as reasonably achievable. COMPARISON: CT abdomen and pelvis, 05/24/2018 HISTORY: ORDERING SYSTEM PROVIDED HISTORY: NAUSEA, VOMITING TECHNOLOGIST PROVIDED HISTORY: Oral and IV Contrast Per doctor Milady Fleming with [facility], please have pt take additional sip of oral contrast just prior to scan to attempt to visualize passage through anastomosis site of bypass FINDINGS: Lower Chest: Patchy foci of airspace disease are noted in the lung bases, including middle lobe. There is no pleural effusion. Coronary artery calcification and mild cardiomegaly are present. Organs: The liver is homogeneous, mildly decreased in attenuation. Multiple stones are layered in the gallbladder. The pancreas is atrophic. The spleen and adrenal glands are unremarkable. GI/Bowel: Gastric stapling is noted. The proximal stomach is distended with oral contrast.  At the distal gastric channel there is minimal passage of oral contrast (at series 2, image number 88). In the bypassed portion of the stomach, there is mild amount of intraluminal content and gas (in the expected range).   The duodenum and small bowel are in collapsed states. There is diverticulosis of the sigmoid colon. No acute inflammatory changes are seen. The mesenteric arteries enhance in normal fashion. Pelvis: The urinary bladder and prostate gland are unremarkable. Peritoneum/Retroperitoneum: The kidneys enhance symmetrically. There are cortical cysts measuring up to 2 cm. No suspicious abnormality or follow-up is recommended. There is no hydronephrosis. There is no adenopathy. There is moderate aortoiliac calcification. No aneurysm is present. Bones/Soft Tissues: There is moderate multilevel spondylosis of the lumbar spine. The abdominal wall is unremarkable. The proximal stomach is distended with oral contrast, with narrowing at the distal gastric staple line, of uncertain significance. Functional versus structural stenosis. Nuclear medicine gastric emptying study or upper GI series may be helpful. Bibasilar atelectasis versus pneumonia or aspiration pneumonitis. Cholelithiasis. Mild fatty infiltration of the liver. Xr Chest Portable    Result Date: 9/27/2018  EXAMINATION: SINGLE XRAY VIEW OF THE CHEST 9/27/2018 2:28 pm COMPARISON: 08/12/2018 HISTORY: ORDERING SYSTEM PROVIDED HISTORY: cough fever TECHNOLOGIST PROVIDED HISTORY: cough fever FINDINGS: Stable cardiomediastinal contours. Chronically elevated right hemidiaphragm also unchanged. No focal airspace consolidation. No pneumothorax or pleural effusion. No acute findings or significant interval change. Xr Abdomen (2 Views)    Result Date: 9/27/2018  EXAMINATION: 4 XRAY VIEWS OF THE ABDOMEN 9/27/2018 2:31 pm COMPARISON: None. HISTORY: ORDERING SYSTEM PROVIDED HISTORY: r/o obstruction TECHNOLOGIST PROVIDED HISTORY: r/o obstruction FINDINGS: Suture material projects over the left upper quadrant. Small bowel and colon have scattered bowel gas, without significant distention. There is a moderate amount of stool projecting over the right colon. There is no free air identified.      Possible

## 2018-09-29 NOTE — PLAN OF CARE
[address] [place] [address]       Second Visit Note  For more detailed information please refer to the progress note of the day      9/29/2018    6:25 PM    Name:   Shelby Buckley  MRN:     [id number]     Neli:      [de-identified]   Room:   2012/2012-01   Day:  1  Admit Date:  9/27/2018  1:37 PM    PCP:   Myesha Castro PA-C  Code Status:  Full Code    Patient was seen  Doing okay with clear liquid diet  Has not \"coughed\" anymore blood  He believes the drainage was from his sinuses  Xarelto on hold    CURRENT MEDS:     metoprolol (LOPRESSOR) injection 2.5 mg Q6H   doxyLAMINE succinate (GNP SLEEP AID) tablet 25 mg Nightly PRN   0.9 % sodium chloride infusion Continuous   sodium chloride flush 0.9 % injection 10 mL 2 times per day   sodium chloride flush 0.9 % injection 10 mL PRN   0.9 % sodium chloride infusion Continuous   morphine (PF) injection 2 mg Q2H PRN   Or    morphine (PF) injection 4 mg Q2H PRN   acetaminophen (TYLENOL) tablet 650 mg Q4H PRN   ondansetron (ZOFRAN) injection 4 mg Q6H PRN   promethazine (PHENERGAN) injection 12.5 mg Q6H PRN   albuterol sulfate  (90 Base) MCG/ACT inhaler 2 puff Q6H PRN   albuterol (PROVENTIL) tablet 2 mg TID PRN   mometasone-formoterol (DULERA) 100-5 MCG/ACT inhaler 2 puff BID   Armodafinil TABS 200 mg QAM   polyethylene glycol (GLYCOLAX) packet 17 g Daily   gabapentin (NEURONTIN) capsule 300 mg TID   albuterol (PROVENTIL) nebulizer solution 2.5 mg Q4H PRN   metoclopramide (REGLAN) 5 MG/5ML solution 5 mg 4x Daily AC & HS   sucralfate (CARAFATE) tablet 1 g 4x Daily   cetirizine (ZYRTEC) tablet 10 mg Daily   cyclobenzaprine (FLEXERIL) tablet 5 mg TID PRN   pantoprazole (PROTONIX) tablet 40 mg QAM AC   multivitamin 1 tablet Daily   ipratropium-albuterol (DUONEB) nebulizer solution 3 mL Q4H PRN       VITALS:  BP (!) 102/55   Pulse 74   Temp 97.5 °F (36.4 °C) (Axillary)   Resp 18   Ht 5' 10.5\" (1.791 m)   Wt 236 lb 3.2 oz (107.1 kg)   SpO2 97%   BMI

## 2018-09-29 NOTE — PROGRESS NOTES
Overnight pt using his home CPAP machine and desating 60% for 5sec. Writer RT contacted, pt placed on his home cpap and 6L O2 and continues to desat to 80% intermittently. ED resident contacted. Per Dr. Page Pro pt for chest pain SOB and orientation. Rn will continue to assess pt.

## 2018-09-30 PROCEDURE — 1200000000 HC SEMI PRIVATE

## 2018-09-30 PROCEDURE — 6370000000 HC RX 637 (ALT 250 FOR IP): Performed by: NURSE PRACTITIONER

## 2018-09-30 PROCEDURE — 6370000000 HC RX 637 (ALT 250 FOR IP): Performed by: STUDENT IN AN ORGANIZED HEALTH CARE EDUCATION/TRAINING PROGRAM

## 2018-09-30 PROCEDURE — 2500000003 HC RX 250 WO HCPCS: Performed by: NURSE PRACTITIONER

## 2018-09-30 PROCEDURE — 2700000000 HC OXYGEN THERAPY PER DAY

## 2018-09-30 PROCEDURE — 6370000000 HC RX 637 (ALT 250 FOR IP): Performed by: INTERNAL MEDICINE

## 2018-09-30 PROCEDURE — 94640 AIRWAY INHALATION TREATMENT: CPT

## 2018-09-30 PROCEDURE — 99232 SBSQ HOSP IP/OBS MODERATE 35: CPT | Performed by: INTERNAL MEDICINE

## 2018-09-30 RX ORDER — FLUTICASONE PROPIONATE 50 MCG
1 SPRAY, SUSPENSION (ML) NASAL DAILY
Status: DISCONTINUED | OUTPATIENT
Start: 2018-09-30 | End: 2018-10-04 | Stop reason: HOSPADM

## 2018-09-30 RX ADMIN — DIPHENHYDRAMINE HCL 25 MG: 25 TABLET ORAL at 00:24

## 2018-09-30 RX ADMIN — CETIRIZINE HYDROCHLORIDE 10 MG: 10 TABLET ORAL at 10:17

## 2018-09-30 RX ADMIN — DIPHENHYDRAMINE HCL 25 MG: 25 TABLET ORAL at 22:43

## 2018-09-30 RX ADMIN — THERA TABS 1 TABLET: TAB at 10:15

## 2018-09-30 RX ADMIN — METOCLOPRAMIDE HYDROCHLORIDE 5 MG: 5 SOLUTION ORAL at 20:51

## 2018-09-30 RX ADMIN — METOCLOPRAMIDE HYDROCHLORIDE 5 MG: 5 SOLUTION ORAL at 06:36

## 2018-09-30 RX ADMIN — POLYETHYLENE GLYCOL 3350 17 G: 17 POWDER, FOR SOLUTION ORAL at 10:30

## 2018-09-30 RX ADMIN — MOMETASONE FUROATE AND FORMOTEROL FUMARATE DIHYDRATE 2 PUFF: 100; 5 AEROSOL RESPIRATORY (INHALATION) at 12:30

## 2018-09-30 RX ADMIN — CYCLOBENZAPRINE HYDROCHLORIDE 5 MG: 5 TABLET, FILM COATED ORAL at 20:54

## 2018-09-30 RX ADMIN — MOMETASONE FUROATE AND FORMOTEROL FUMARATE DIHYDRATE 2 PUFF: 100; 5 AEROSOL RESPIRATORY (INHALATION) at 20:59

## 2018-09-30 RX ADMIN — GABAPENTIN 300 MG: 300 CAPSULE ORAL at 20:51

## 2018-09-30 RX ADMIN — METOPROLOL TARTRATE 2.5 MG: 1 INJECTION, SOLUTION INTRAVENOUS at 06:37

## 2018-09-30 RX ADMIN — PANTOPRAZOLE SODIUM 40 MG: 40 TABLET, DELAYED RELEASE ORAL at 10:15

## 2018-09-30 RX ADMIN — SUCRALFATE 1 G: 1 TABLET ORAL at 14:00

## 2018-09-30 RX ADMIN — METOCLOPRAMIDE HYDROCHLORIDE 5 MG: 5 SOLUTION ORAL at 17:15

## 2018-09-30 RX ADMIN — GABAPENTIN 300 MG: 300 CAPSULE ORAL at 10:16

## 2018-09-30 RX ADMIN — SUCRALFATE 1 G: 1 TABLET ORAL at 10:15

## 2018-09-30 RX ADMIN — GABAPENTIN 300 MG: 300 CAPSULE ORAL at 14:01

## 2018-09-30 RX ADMIN — FLUTICASONE PROPIONATE 1 SPRAY: 50 SPRAY, METERED NASAL at 20:52

## 2018-09-30 RX ADMIN — SUCRALFATE 1 G: 1 TABLET ORAL at 20:51

## 2018-09-30 RX ADMIN — METOPROLOL TARTRATE 2.5 MG: 1 INJECTION, SOLUTION INTRAVENOUS at 01:58

## 2018-09-30 RX ADMIN — METOCLOPRAMIDE HYDROCHLORIDE 5 MG: 5 SOLUTION ORAL at 10:17

## 2018-09-30 ASSESSMENT — ENCOUNTER SYMPTOMS
WHEEZING: 1
HEMOPTYSIS: 0
NAUSEA: 0
SHORTNESS OF BREATH: 0
COUGH: 1
SINUS PAIN: 0
ABDOMINAL PAIN: 0
SPUTUM PRODUCTION: 1

## 2018-09-30 ASSESSMENT — PAIN SCALES - GENERAL: PAINLEVEL_OUTOF10: 0

## 2018-09-30 NOTE — PLAN OF CARE
Problem: Falls - Risk of:  Goal: Will remain free from falls  Will remain free from falls   Outcome: Ongoing  High fall protocols in place  Bed in lowest position  Call light within reach  Continue to monitor

## 2018-09-30 NOTE — PROGRESS NOTES
Patient also has headache, muscle aches, sore throat. \"     The patient acknowledges that he continues to have difficulty eating  There has been nausea  He has had emesis of bile/brown material  This morning he coughed up a small amount of dark red blood  He is unable to ascertain the origin - lung, stomach, postnasal drip,  There has been a prior history of hematemesis? He does report his stools have been dark     The patient was admitted  GI was consulted  Gen. surgery was consulted     The patient has a known history of prior stricture/dilation  On 8/13 he underwent EGD:  Procedure(s):  1. EGD removal of foreign body (food bolus)  2. Gastric antrum biopsy       On 8/16 he underwent:  PROCEDURES:   1) Transoral Upper Endoscopy with CRE dilation. Food bolus extraction and dilution and irrigation of Barium contrast (partial concretion present).    EKG:  Atrial fibrillation with premature ventricular or aberrantly conducted complexes  Right bundle branch block  Abnormal ECG  When compared with ECG of 12-AUG-2018 20:36,  No significant change was found    The patient was admitted  GI was consulted  Surgery was consulted  Gastric dilation planned for 10/1    Past Medical History:   has a past medical history of Asthma; Atrial fibrillation ([address] GERD (gastroesophageal reflux disease); Hyperlipidemia; Hypertension; Obesity; Osteoarthritis; and Unspecified sleep apnea. Social History:   reports that he quit smoking about 41 years ago. He has a 20.00 pack-year smoking history. He has never used smokeless tobacco. He reports that he does not drink alcohol or use drugs. Family History:   Family History   Problem Relation Age of Onset    Cancer Mother     Heart Attack Father        Medications: Allergies:     Allergies   Allergen Reactions    Darvocet [Propoxyphene N-Acetaminophen] Hives    Other Hives    Oxycodone-Acetaminophen        Current Meds:   Scheduled Meds:    metoprolol  2.5 mg Intravenous Q6H

## 2018-10-01 ENCOUNTER — ANESTHESIA (OUTPATIENT)
Dept: ENDOSCOPY | Age: 73
DRG: 391 | End: 2018-10-01
Payer: MEDICARE

## 2018-10-01 ENCOUNTER — ANESTHESIA EVENT (OUTPATIENT)
Dept: ENDOSCOPY | Age: 73
DRG: 391 | End: 2018-10-01
Payer: MEDICARE

## 2018-10-01 VITALS
RESPIRATION RATE: 17 BRPM | OXYGEN SATURATION: 97 % | DIASTOLIC BLOOD PRESSURE: 79 MMHG | SYSTOLIC BLOOD PRESSURE: 156 MMHG

## 2018-10-01 LAB
ALBUMIN SERPL-MCNC: 2.6 G/DL (ref 3.5–5.2)
ALBUMIN/GLOBULIN RATIO: 0.8 (ref 1–2.5)
ALP BLD-CCNC: 112 U/L (ref 40–129)
ALT SERPL-CCNC: 8 U/L (ref 5–41)
ANION GAP SERPL CALCULATED.3IONS-SCNC: 8 MMOL/L (ref 9–17)
AST SERPL-CCNC: 13 U/L
BILIRUB SERPL-MCNC: 0.9 MG/DL (ref 0.3–1.2)
BILIRUBIN DIRECT: 0.56 MG/DL
BILIRUBIN, INDIRECT: 0.34 MG/DL (ref 0–1)
BUN BLDV-MCNC: 5 MG/DL (ref 8–23)
CALCIUM SERPL-MCNC: 8.3 MG/DL (ref 8.6–10.4)
CHLORIDE BLD-SCNC: 104 MMOL/L (ref 98–107)
CO2: 26 MMOL/L (ref 20–31)
CREAT SERPL-MCNC: 0.44 MG/DL (ref 0.7–1.2)
GFR AFRICAN AMERICAN: >60 ML/MIN
GFR NON-AFRICAN AMERICAN: >60 ML/MIN
GFR SERPL CREATININE-BSD FRML MDRD: ABNORMAL ML/MIN/{1.73_M2}
GFR SERPL CREATININE-BSD FRML MDRD: ABNORMAL ML/MIN/{1.73_M2}
GLUCOSE BLD-MCNC: 96 MG/DL (ref 70–99)
HCT VFR BLD CALC: 30.4 % (ref 40.7–50.3)
HEMOGLOBIN: 9.2 G/DL (ref 13–17)
MCH RBC QN AUTO: 29.8 PG (ref 25.2–33.5)
MCHC RBC AUTO-ENTMCNC: 30.3 G/DL (ref 28.4–34.8)
MCV RBC AUTO: 98.4 FL (ref 82.6–102.9)
NRBC AUTOMATED: 0 PER 100 WBC
PDW BLD-RTO: 15.9 % (ref 11.8–14.4)
PLATELET # BLD: 149 K/UL (ref 138–453)
PMV BLD AUTO: 9.1 FL (ref 8.1–13.5)
POTASSIUM SERPL-SCNC: 3.8 MMOL/L (ref 3.7–5.3)
RBC # BLD: 3.09 M/UL (ref 4.21–5.77)
SODIUM BLD-SCNC: 138 MMOL/L (ref 135–144)
TOTAL PROTEIN: 5.7 G/DL (ref 6.4–8.3)
WBC # BLD: 4.9 K/UL (ref 3.5–11.3)

## 2018-10-01 PROCEDURE — 6370000000 HC RX 637 (ALT 250 FOR IP): Performed by: INTERNAL MEDICINE

## 2018-10-01 PROCEDURE — C1726 CATH, BAL DIL, NON-VASCULAR: HCPCS | Performed by: INTERNAL MEDICINE

## 2018-10-01 PROCEDURE — 0DB78ZX EXCISION OF STOMACH, PYLORUS, VIA NATURAL OR ARTIFICIAL OPENING ENDOSCOPIC, DIAGNOSTIC: ICD-10-PCS | Performed by: INTERNAL MEDICINE

## 2018-10-01 PROCEDURE — 2500000003 HC RX 250 WO HCPCS: Performed by: INTERNAL MEDICINE

## 2018-10-01 PROCEDURE — 1200000000 HC SEMI PRIVATE

## 2018-10-01 PROCEDURE — 94640 AIRWAY INHALATION TREATMENT: CPT

## 2018-10-01 PROCEDURE — 2700000000 HC OXYGEN THERAPY PER DAY

## 2018-10-01 PROCEDURE — 80053 COMPREHEN METABOLIC PANEL: CPT

## 2018-10-01 PROCEDURE — 6370000000 HC RX 637 (ALT 250 FOR IP): Performed by: STUDENT IN AN ORGANIZED HEALTH CARE EDUCATION/TRAINING PROGRAM

## 2018-10-01 PROCEDURE — 2500000003 HC RX 250 WO HCPCS: Performed by: NURSE PRACTITIONER

## 2018-10-01 PROCEDURE — 2580000003 HC RX 258: Performed by: STUDENT IN AN ORGANIZED HEALTH CARE EDUCATION/TRAINING PROGRAM

## 2018-10-01 PROCEDURE — 36415 COLL VENOUS BLD VENIPUNCTURE: CPT

## 2018-10-01 PROCEDURE — 88305 TISSUE EXAM BY PATHOLOGIST: CPT

## 2018-10-01 PROCEDURE — 94762 N-INVAS EAR/PLS OXIMTRY CONT: CPT

## 2018-10-01 PROCEDURE — 3700000000 HC ANESTHESIA ATTENDED CARE: Performed by: INTERNAL MEDICINE

## 2018-10-01 PROCEDURE — 7100000011 HC PHASE II RECOVERY - ADDTL 15 MIN: Performed by: INTERNAL MEDICINE

## 2018-10-01 PROCEDURE — 3609012400 HC EGD TRANSORAL BIOPSY SINGLE/MULTIPLE: Performed by: INTERNAL MEDICINE

## 2018-10-01 PROCEDURE — 6360000002 HC RX W HCPCS: Performed by: NURSE ANESTHETIST, CERTIFIED REGISTERED

## 2018-10-01 PROCEDURE — 82248 BILIRUBIN DIRECT: CPT

## 2018-10-01 PROCEDURE — 85027 COMPLETE CBC AUTOMATED: CPT

## 2018-10-01 PROCEDURE — 2580000003 HC RX 258: Performed by: NURSE ANESTHETIST, CERTIFIED REGISTERED

## 2018-10-01 PROCEDURE — 3700000001 HC ADD 15 MINUTES (ANESTHESIA): Performed by: INTERNAL MEDICINE

## 2018-10-01 PROCEDURE — 43239 EGD BIOPSY SINGLE/MULTIPLE: CPT | Performed by: INTERNAL MEDICINE

## 2018-10-01 PROCEDURE — 6370000000 HC RX 637 (ALT 250 FOR IP): Performed by: EMERGENCY MEDICINE

## 2018-10-01 PROCEDURE — 3609012500 HC EGD DILATION BALLOON: Performed by: INTERNAL MEDICINE

## 2018-10-01 PROCEDURE — 2709999900 HC NON-CHARGEABLE SUPPLY: Performed by: INTERNAL MEDICINE

## 2018-10-01 PROCEDURE — 2580000003 HC RX 258: Performed by: NURSE PRACTITIONER

## 2018-10-01 PROCEDURE — 0D768ZZ DILATION OF STOMACH, VIA NATURAL OR ARTIFICIAL OPENING ENDOSCOPIC: ICD-10-PCS | Performed by: INTERNAL MEDICINE

## 2018-10-01 PROCEDURE — 7100000010 HC PHASE II RECOVERY - FIRST 15 MIN: Performed by: INTERNAL MEDICINE

## 2018-10-01 RX ORDER — METOPROLOL SUCCINATE 50 MG/1
50 TABLET, EXTENDED RELEASE ORAL DAILY
Status: DISCONTINUED | OUTPATIENT
Start: 2018-10-02 | End: 2018-10-04 | Stop reason: HOSPADM

## 2018-10-01 RX ORDER — PROPOFOL 10 MG/ML
INJECTION, EMULSION INTRAVENOUS PRN
Status: DISCONTINUED | OUTPATIENT
Start: 2018-10-01 | End: 2018-10-01 | Stop reason: SDUPTHER

## 2018-10-01 RX ORDER — SPIRONOLACTONE 25 MG/1
25 TABLET ORAL DAILY
Status: DISCONTINUED | OUTPATIENT
Start: 2018-10-01 | End: 2018-10-04 | Stop reason: HOSPADM

## 2018-10-01 RX ORDER — BUMETANIDE 1 MG/1
1 TABLET ORAL 2 TIMES DAILY
Status: DISCONTINUED | OUTPATIENT
Start: 2018-10-02 | End: 2018-10-02

## 2018-10-01 RX ORDER — BUMETANIDE 0.25 MG/ML
0.5 INJECTION, SOLUTION INTRAMUSCULAR; INTRAVENOUS ONCE
Status: COMPLETED | OUTPATIENT
Start: 2018-10-01 | End: 2018-10-01

## 2018-10-01 RX ORDER — SODIUM CHLORIDE 9 MG/ML
INJECTION, SOLUTION INTRAVENOUS CONTINUOUS PRN
Status: DISCONTINUED | OUTPATIENT
Start: 2018-10-01 | End: 2018-10-01 | Stop reason: SDUPTHER

## 2018-10-01 RX ADMIN — ACETAMINOPHEN 650 MG: 325 TABLET ORAL at 05:49

## 2018-10-01 RX ADMIN — SUCRALFATE 1 G: 1 TABLET ORAL at 22:22

## 2018-10-01 RX ADMIN — METOCLOPRAMIDE HYDROCHLORIDE 5 MG: 5 SOLUTION ORAL at 22:22

## 2018-10-01 RX ADMIN — DIPHENHYDRAMINE HCL 25 MG: 25 TABLET ORAL at 22:22

## 2018-10-01 RX ADMIN — Medication 10 ML: at 22:25

## 2018-10-01 RX ADMIN — ALBUTEROL SULFATE 2 MG: 2 TABLET ORAL at 08:30

## 2018-10-01 RX ADMIN — SPIRONOLACTONE 25 MG: 25 TABLET ORAL at 19:19

## 2018-10-01 RX ADMIN — GABAPENTIN 300 MG: 300 CAPSULE ORAL at 08:31

## 2018-10-01 RX ADMIN — METOCLOPRAMIDE HYDROCHLORIDE 5 MG: 5 SOLUTION ORAL at 19:18

## 2018-10-01 RX ADMIN — FLUTICASONE PROPIONATE 1 SPRAY: 50 SPRAY, METERED NASAL at 08:31

## 2018-10-01 RX ADMIN — SUCRALFATE 1 G: 1 TABLET ORAL at 02:16

## 2018-10-01 RX ADMIN — METOPROLOL TARTRATE 2.5 MG: 1 INJECTION, SOLUTION INTRAVENOUS at 05:51

## 2018-10-01 RX ADMIN — SODIUM CHLORIDE: 9 INJECTION, SOLUTION INTRAVENOUS at 04:16

## 2018-10-01 RX ADMIN — MOMETASONE FUROATE AND FORMOTEROL FUMARATE DIHYDRATE 2 PUFF: 100; 5 AEROSOL RESPIRATORY (INHALATION) at 09:41

## 2018-10-01 RX ADMIN — GABAPENTIN 300 MG: 300 CAPSULE ORAL at 22:22

## 2018-10-01 RX ADMIN — IPRATROPIUM BROMIDE AND ALBUTEROL SULFATE 3 ML: .5; 3 SOLUTION RESPIRATORY (INHALATION) at 21:39

## 2018-10-01 RX ADMIN — PROPOFOL 70 MG: 10 INJECTION, EMULSION INTRAVENOUS at 12:19

## 2018-10-01 RX ADMIN — CETIRIZINE HYDROCHLORIDE 10 MG: 10 TABLET ORAL at 08:32

## 2018-10-01 RX ADMIN — METOCLOPRAMIDE HYDROCHLORIDE 5 MG: 5 SOLUTION ORAL at 06:22

## 2018-10-01 RX ADMIN — PANTOPRAZOLE SODIUM 40 MG: 40 TABLET, DELAYED RELEASE ORAL at 08:31

## 2018-10-01 RX ADMIN — CYCLOBENZAPRINE HYDROCHLORIDE 5 MG: 5 TABLET, FILM COATED ORAL at 22:22

## 2018-10-01 RX ADMIN — SODIUM CHLORIDE: 9 INJECTION, SOLUTION INTRAVENOUS at 12:15

## 2018-10-01 RX ADMIN — Medication 10 ML: at 08:31

## 2018-10-01 RX ADMIN — BUMETANIDE 0.5 MG: 0.25 INJECTION INTRAMUSCULAR; INTRAVENOUS at 19:19

## 2018-10-01 RX ADMIN — Medication 10 ML: at 05:53

## 2018-10-01 RX ADMIN — THERA TABS 1 TABLET: TAB at 08:30

## 2018-10-01 RX ADMIN — SUCRALFATE 1 G: 1 TABLET ORAL at 19:19

## 2018-10-01 RX ADMIN — SUCRALFATE 1 G: 1 TABLET ORAL at 08:31

## 2018-10-01 RX ADMIN — MOMETASONE FUROATE AND FORMOTEROL FUMARATE DIHYDRATE 2 PUFF: 100; 5 AEROSOL RESPIRATORY (INHALATION) at 21:39

## 2018-10-01 ASSESSMENT — ENCOUNTER SYMPTOMS
HEMOPTYSIS: 0
SPUTUM PRODUCTION: 1
SINUS PAIN: 0
SHORTNESS OF BREATH: 0
WHEEZING: 1
COUGH: 1
ABDOMINAL PAIN: 0
NAUSEA: 0

## 2018-10-01 ASSESSMENT — PAIN SCALES - GENERAL
PAINLEVEL_OUTOF10: 0
PAINLEVEL_OUTOF10: 3

## 2018-10-01 ASSESSMENT — LIFESTYLE VARIABLES: SMOKING_STATUS: 0

## 2018-10-01 NOTE — DISCHARGE SUMMARY
NONE    Amorphous, UA NOT REPORTED NONE    Other Observations UA NOT REPORTED NREQ    Yeast, UA NOT REPORTED NONE   POCT troponin   Result Value Ref Range    POC Troponin I 0.03 0.00 - 0.10 ng/mL    POC Troponin Interp       The Troponin-I (POC) results cannot be compared to the Troponin-T results. EKG 12 Lead   Result Value Ref Range    Ventricular Rate 89 BPM    Atrial Rate 100 BPM    QRS Duration 124 ms    Q-T Interval 406 ms    QTc Calculation (Bazett) 493 ms    R Axis 147 degrees    T Axis -12 degrees     Ct Abdomen Pelvis W Iv Contrast    Result Date: 9/27/2018  EXAMINATION: CT OF THE ABDOMEN AND PELVIS WITH CONTRAST 9/27/2018 5:25 pm TECHNIQUE: CT of the abdomen and pelvis was performed with the administration of intravenous contrast. Multiplanar reformatted images are provided for review. Dose modulation, iterative reconstruction, and/or weight based adjustment of the mA/kV was utilized to reduce the radiation dose to as low as reasonably achievable. COMPARISON: CT abdomen and pelvis, 05/24/2018 HISTORY: ORDERING SYSTEM PROVIDED HISTORY: NAUSEA, VOMITING TECHNOLOGIST PROVIDED HISTORY: Oral and IV Contrast Per doctor Regan Hazy with [facility], please have pt take additional sip of oral contrast just prior to scan to attempt to visualize passage through anastomosis site of bypass FINDINGS: Lower Chest: Patchy foci of airspace disease are noted in the lung bases, including middle lobe. There is no pleural effusion. Coronary artery calcification and mild cardiomegaly are present. Organs: The liver is homogeneous, mildly decreased in attenuation. Multiple stones are layered in the gallbladder. The pancreas is atrophic. The spleen and adrenal glands are unremarkable. GI/Bowel: Gastric stapling is noted. The proximal stomach is distended with oral contrast.  At the distal gastric channel there is minimal passage of oral contrast (at series 2, image number 88).   In the bypassed portion of the stomach, there is There is a moderate amount of stool projecting over the right colon. There is no free air identified. Possible constipation with prominence of stool in the ascending colon. No obstruction is evident. Pattern is overall nonspecific. Physical Exam:    General appearance - NAD, AOx 3   Lungs -CTAB, no R/R/R  Heart - RRR, no M/R/G  Abdomen - Soft, NT/ND  Neurological:  MAEx4, No focal motor deficit, sensory loss  Extremities - Cap refil <2 sec in all ext., no edema  Skin -warm, dry      Hospital Course:  Patient transferred to [facility]   Aneudy Verma originally presented to the hospital on 9/27/2018  1:37 PM with transfer to [facility]. 1.)Acute on chronic nausea and vomiting associated with weakness due to existing gastric stricture. Was admitted to OBS for the following:  -followed by Gen Surg, instructions to be strictly NPO, try to avoid NGT, GI consult for EGD w/ dilation.  - IV zofran, IV phenergan, IV saline  -spoke with GI, who want him strictly NPO (including ice)  -GI has requested I hold xarelto, as pt cannot be scoped with anticoagulation  -Plan to speak with cardiologist    2.)  Acute epigastric abdominal pain and vomiting  -Patient is passing gas and is no longer vomiting  - Discomfort is likely from vomiting induced gastritis     3.)  Chronic atrial fibrillation  -Is mgd by [facility]  -Will discuss cessation of Xarelto  Patient has normal heart rate without rate or rhythm control medications       At that time it was determined that He required further observation and transfer to hospitalist care. Labs and imaging were followed daily. Imaging results as above. Disposition: transfer to hospitalists  Condition: stable    Time Spent: 2 day      --  Anjali Galindo MD  Emergency Medicine Resident Physician    This dictation was generated by voice recognition computer software.   Although all attempts are made to edit the dictation for accuracy, there

## 2018-10-01 NOTE — PROGRESS NOTES
Subjective: Up in chair. C/O some dyspnea.  Denies CP    VS: /72   Pulse 70   Temp 98.1 °F (36.7 °C) (Oral)   Resp 13   Ht 5' 10.5\" (1.791 m)   Wt 259 lb 8 oz (117.7 kg)   SpO2 99%   BMI 36.71 kg/m²     Wt:     I/O: In: 6537 [P.O.:375; I.V.:1258]  Out: 1225 [Urine:1225]    Monitor:Atrial fibrillation with controlled Vrate    Meds: Scheduled Meds:   bumetanide  0.5 mg Intravenous Once    [START ON 10/2/2018] bumetanide  1 mg Oral BID    fluticasone  1 spray Each Nare Daily    metoprolol  2.5 mg Intravenous Q6H    sodium chloride flush  10 mL Intravenous 2 times per day    mometasone-formoterol  2 puff Inhalation BID    Armodafinil  200 mg Mouth/Throat QAM    polyethylene glycol  17 g Oral Daily    gabapentin  300 mg Oral TID    metoclopramide  5 mg Oral 4x Daily AC & HS    sucralfate  1 g Oral 4x Daily    cetirizine  10 mg Oral Daily    pantoprazole  40 mg Oral QAM AC    multivitamin  1 tablet Oral Daily     Continuous Infusions:  PRN Meds:.diphenhydrAMINE, doxyLAMINE succinate, sodium chloride flush, morphine **OR** morphine, acetaminophen, ondansetron, promethazine, albuterol sulfate HFA, albuterol, albuterol, cyclobenzaprine, ipratropium-albuterol     Exam:General Appearance: AOX3, NAD  Skin:pink, warm ,dry  Pulmonary/Chest: Diminished at bases  Cardiovascular: S1S2, Irregular, negative JVD  Extremities:Mild peripheral edema    Labs:  CBC with Differential:    Lab Results   Component Value Date    WBC 4.9 10/01/2018    RBC 3.09 10/01/2018    RBC 4.39 09/13/2011    HGB 9.2 10/01/2018    HCT 30.4 10/01/2018     10/01/2018     09/13/2011    MCV 98.4 10/01/2018    MCH 29.8 10/01/2018    MCHC 30.3 10/01/2018    RDW 15.9 10/01/2018    LYMPHOPCT 18 08/19/2018    LYMPHOPCT 21.2 10/30/2013    MONOPCT 13 08/19/2018    MONOPCT 7.9 10/30/2013    EOSPCT 7.9 10/30/2013    BASOPCT 0 08/19/2018    BASOPCT 0.7 10/30/2013    MONOSABS 0.60 08/19/2018    MONOSABS 0.5 10/30/2013    LYMPHSABS 0.85 08/19/2018    LYMPHSABS 1.4 10/30/2013    EOSABS 0.32 08/19/2018    EOSABS 0.5 10/30/2013    BASOSABS <0.03 08/19/2018    DIFFTYPE NOT REPORTED 08/19/2018         BMP:    Lab Results   Component Value Date     10/01/2018    K 3.8 10/01/2018     10/01/2018    CO2 26 10/01/2018    BUN 5 10/01/2018    LABALBU 2.6 10/01/2018    CREATININE 0.44 10/01/2018    CALCIUM 8.3 10/01/2018    GFRAA >60 10/01/2018    LABGLOM >60 10/01/2018    GLUCOSE 96 10/01/2018    GLUCOSE 99 09/13/2011         A/P:1. Atrial fibrillation  -Vrate controlled. Will resume home dose Toprol. Resume Xarelto tomorrow     2. HTN  -BP controlled    3.  Dyspnea  -Diuretics resumed.      Will discuss with Dr. Matt Olivo    Electronically signed by JULIAN Funez CNP on 10/1/2018 at 5:23 PM

## 2018-10-01 NOTE — ANESTHESIA PRE PROCEDURE
injection 10 mL  10 mL Intravenous 2 times per day Sahuarita Kalata, DO   10 mL at 10/01/18 0831    sodium chloride flush 0.9 % injection 10 mL  10 mL Intravenous PRN Louetta Favor Sonu, DO   10 mL at 10/01/18 0553    0.9 % sodium chloride infusion   Intravenous Continuous Kelli Devon, APRN - CNP 75 mL/hr at 10/01/18 0416      morphine (PF) injection 2 mg  2 mg Intravenous Q2H PRN Sahuarita Kalata, DO        Or    morphine (PF) injection 4 mg  4 mg Intravenous Q2H PRN Louetta Favor Sonu, DO        acetaminophen (TYLENOL) tablet 650 mg  650 mg Oral Q4H PRN Louetta Favor Sonu, DO   650 mg at 10/01/18 0549    ondansetron (ZOFRAN) injection 4 mg  4 mg Intravenous Q6H PRN Sahuarita Kalata, DO   4 mg at 09/27/18 2048    promethazine (PHENERGAN) injection 12.5 mg  12.5 mg Intravenous Q6H PRN Louetta Favor Adelanto, DO        albuterol sulfate  (90 Base) MCG/ACT inhaler 2 puff  2 puff Inhalation Q6H PRN Sahuarita Kalata, DO   2 puff at 09/29/18 2052    albuterol (PROVENTIL) tablet 2 mg  2 mg Oral TID PRN Sidney Kalata, DO   2 mg at 10/01/18 0830    mometasone-formoterol (DULERA) 100-5 MCG/ACT inhaler 2 puff  2 puff Inhalation BID Sidney Kalata, DO   2 puff at 10/01/18 0941    Armodafinil TABS 200 mg  200 mg Mouth/Throat QAM Louetta Favor Sonu, DO        polyethylene glycol (GLYCOLAX) packet 17 g  17 g Oral Daily Louetta Favor Sonu, DO   17 g at 09/30/18 1030    gabapentin (NEURONTIN) capsule 300 mg  300 mg Oral TID Sidney Kalata, DO   300 mg at 10/01/18 0831    albuterol (PROVENTIL) nebulizer solution 2.5 mg  2.5 mg Nebulization Q4H PRN Louetta Favor Sonu, DO   2.5 mg at 09/29/18 1808    metoclopramide (REGLAN) 5 MG/5ML solution 5 mg  5 mg Oral 4x Daily AC & HS Louetta Favor Adelanto, DO   5 mg at 10/01/18 0814    sucralfate (CARAFATE) tablet 1 g  1 g Oral 4x Daily Louetta Favor Adelanto, DO   1 g at 10/01/18 0831    cetirizine (ZYRTEC) tablet 10 mg  10 mg Oral Daily Louetta Favor Adelanto, DO   10 mg at 10/01/18 6463    cyclobenzaprine  Gastric anastomotic stricture K92.9, K31.89       Past Medical History:        Diagnosis Date    Asthma     Atrial fibrillation (HCC)     GERD (gastroesophageal reflux disease)     Hyperlipidemia     Hypertension     Obesity     Osteoarthritis     Unspecified sleep apnea        Past Surgical History:        Procedure Laterality Date    CARPAL TUNNEL RELEASE      bilateral    COLONOSCOPY      FINGER AMPUTATION  1966    first knuckle right index finger    GASTRIC BYPASS SURGERY  1985    vertical banded gastroplasty    HEMORRHOID SURGERY      JOINT REPLACEMENT  2004 & 2005    bilateral knees    [facility] EGD FLEXIBLE FOREIGN BODY REMOVAL N/A 8/16/2018    EGD FOREIGN BODY REMOVAL performed by Bill Norris MD at [address] [place] [address] EGD TRANSORAL BIOPSY SINGLE/MULTIPLE N/A 5/25/2018    EGD BIOPSY performed by Cheyenne Pickens DO at [address] [facility]      left shoulder    UPPER GASTROINTESTINAL ENDOSCOPY  08/13/2018    removal of food bolus    UPPER GASTROINTESTINAL ENDOSCOPY N/A 8/13/2018    EGD FOREIGN BODY REMOVAL performed by Cheyenne Pickens DO at [address] 8/13/2018    EGD BIOPSY performed by Cheyenne Pickens DO at [address]  08/16/2018    egd with balloon dilitation    UPPER GASTROINTESTINAL ENDOSCOPY  8/16/2018    EGD DILATION BALLOON performed by Bill Norris MD at [facility] History:    Social History   Substance Use Topics    Smoking status: Former Smoker     Packs/day: 1.00     Years: 20.00     Quit date: 12/14/1976    Smokeless tobacco: Never Used    Alcohol use No                                Counseling given: Not Answered      Vital Signs (Current):   Vitals:    10/01/18 0550 10/01/18 0625 10/01/18 0738 10/01/18 0941   BP: 129/82  119/66    Pulse:   66 70   Resp:   18 18   Temp:   36.5 °C (97.7 °F)    TempSrc:   Oral    SpO2:   95% 100%   Weight:  259 lb 8 oz (117.7 kg) smoke on day of surgery. ROS comment: Former smoker   Cardiovascular:    (+) hypertension: no interval change, dysrhythmias: atrial fibrillation, CHF: no interval change, hyperlipidemia        Rhythm: irregular             Beta Blocker:  Dose within 24 Hrs         Neuro/Psych:   Negative Neuro/Psych ROS              GI/Hepatic/Renal:   (+) GERD: well controlled, PUD,           Endo/Other:    (+) : arthritis: OA and no interval change. , .        Medications not reviewed for medical reasons. Pt had PAT visit. Abdominal:   (+) obese,     Abdomen: soft. Vascular: negative vascular ROS. Anesthesia Plan      MAC and general     ASA 3       Induction: intravenous. Anesthetic plan and risks discussed with patient and spouse. Use of blood products discussed with patient and spouse whom. Plan discussed with CRNA and attending.                   Steven Staff   10/1/2018

## 2018-10-01 NOTE — PROGRESS NOTES
headache, muscle aches, sore throat. \"     The patient acknowledges that he continues to have difficulty eating  There has been nausea  He has had emesis of bile/brown material  This morning he coughed up a small amount of dark red blood  He is unable to ascertain the origin - lung, stomach, postnasal drip,  There has been a prior history of hematemesis? He does report his stools have been dark     The patient was admitted  GI was consulted  Gen. surgery was consulted     The patient has a known history of prior stricture/dilation  On 8/13 he underwent EGD:  Procedure(s):  1. EGD removal of foreign body (food bolus)  2. Gastric antrum biopsy       On 8/16 he underwent:  PROCEDURES:   1) Transoral Upper Endoscopy with CRE dilation. Food bolus extraction and dilution and irrigation of Barium contrast (partial concretion present).    EKG:  Atrial fibrillation with premature ventricular or aberrantly conducted complexes  Right bundle branch block  Abnormal ECG  When compared with ECG of 12-AUG-2018 20:36,  No significant change was found    The patient was admitted  GI was consulted  Surgery was consulted  Gastric dilation planned for 10/1    Past Medical History:   has a past medical history of Asthma; Atrial fibrillation ([address] GERD (gastroesophageal reflux disease); Hyperlipidemia; Hypertension; Obesity; Osteoarthritis; and Unspecified sleep apnea. Social History:   reports that he quit smoking about 41 years ago. He has a 20.00 pack-year smoking history. He has never used smokeless tobacco. He reports that he does not drink alcohol or use drugs. Family History:   Family History   Problem Relation Age of Onset    Cancer Mother     Heart Attack Father        Medications: Allergies:     Allergies   Allergen Reactions    Darvocet [Propoxyphene N-Acetaminophen] Hives    Other Hives    Oxycodone-Acetaminophen        Current Meds:   Scheduled Meds:    bumetanide  0.5 mg Intravenous Once    [START ON

## 2018-10-01 NOTE — ANESTHESIA POSTPROCEDURE EVALUATION
Department of Anesthesiology  Postprocedure Note    Patient: Diana Price  MRN: [id number]  YOB: 1945  Date of evaluation: 10/1/2018  Time:  12:58 PM     Procedure Summary     Date:  10/01/18 Room / Location:  [facility] ENDO 03 / [facility]    Anesthesia Start:  7780 Anesthesia Stop:  7756    Procedures:       EGD BIOPSY (N/A )      EGD DILATION BALLOON Diagnosis:  (ADD ON )    Surgeon:  Nas Tinoco MD Responsible Provider:  Nathaly Arce MD    Anesthesia Type:  MAC, general ASA Status:  3          Anesthesia Type: MAC, general    Mark Phase I: Mark Score: 10    Mark Phase II: Mark Score: 9    Last vitals: Reviewed and per EMR flowsheets.        Anesthesia Post Evaluation    Patient location during evaluation: bedside  Patient participation: complete - patient participated  Level of consciousness: awake and alert  Pain score: 0  Nausea & Vomiting: no nausea  Cardiovascular status: hemodynamically stable  Respiratory status: nasal cannula  Hydration status: euvolemic

## 2018-10-02 ENCOUNTER — APPOINTMENT (OUTPATIENT)
Dept: GENERAL RADIOLOGY | Age: 73
DRG: 391 | End: 2018-10-02
Payer: MEDICARE

## 2018-10-02 LAB — SURGICAL PATHOLOGY REPORT: NORMAL

## 2018-10-02 PROCEDURE — 2580000003 HC RX 258: Performed by: STUDENT IN AN ORGANIZED HEALTH CARE EDUCATION/TRAINING PROGRAM

## 2018-10-02 PROCEDURE — 6370000000 HC RX 637 (ALT 250 FOR IP): Performed by: NURSE PRACTITIONER

## 2018-10-02 PROCEDURE — 6370000000 HC RX 637 (ALT 250 FOR IP): Performed by: EMERGENCY MEDICINE

## 2018-10-02 PROCEDURE — 6360000002 HC RX W HCPCS: Performed by: STUDENT IN AN ORGANIZED HEALTH CARE EDUCATION/TRAINING PROGRAM

## 2018-10-02 PROCEDURE — 6370000000 HC RX 637 (ALT 250 FOR IP): Performed by: STUDENT IN AN ORGANIZED HEALTH CARE EDUCATION/TRAINING PROGRAM

## 2018-10-02 PROCEDURE — 6370000000 HC RX 637 (ALT 250 FOR IP): Performed by: INTERNAL MEDICINE

## 2018-10-02 PROCEDURE — 99232 SBSQ HOSP IP/OBS MODERATE 35: CPT | Performed by: INTERNAL MEDICINE

## 2018-10-02 PROCEDURE — 71045 X-RAY EXAM CHEST 1 VIEW: CPT

## 2018-10-02 PROCEDURE — 1200000000 HC SEMI PRIVATE

## 2018-10-02 PROCEDURE — 2500000003 HC RX 250 WO HCPCS: Performed by: INTERNAL MEDICINE

## 2018-10-02 PROCEDURE — 94640 AIRWAY INHALATION TREATMENT: CPT

## 2018-10-02 PROCEDURE — 2700000000 HC OXYGEN THERAPY PER DAY

## 2018-10-02 RX ORDER — BUMETANIDE 0.25 MG/ML
1 INJECTION, SOLUTION INTRAMUSCULAR; INTRAVENOUS 2 TIMES DAILY
Status: DISCONTINUED | OUTPATIENT
Start: 2018-10-02 | End: 2018-10-04 | Stop reason: HOSPADM

## 2018-10-02 RX ADMIN — BUMETANIDE 1 MG: 0.25 INJECTION INTRAMUSCULAR; INTRAVENOUS at 09:43

## 2018-10-02 RX ADMIN — METOPROLOL SUCCINATE 50 MG: 50 TABLET, FILM COATED, EXTENDED RELEASE ORAL at 09:42

## 2018-10-02 RX ADMIN — Medication 10 ML: at 14:31

## 2018-10-02 RX ADMIN — SUCRALFATE 1 G: 1 TABLET ORAL at 20:20

## 2018-10-02 RX ADMIN — FLUTICASONE PROPIONATE 1 SPRAY: 50 SPRAY, METERED NASAL at 12:05

## 2018-10-02 RX ADMIN — GABAPENTIN 300 MG: 300 CAPSULE ORAL at 20:20

## 2018-10-02 RX ADMIN — SUCRALFATE 1 G: 1 TABLET ORAL at 14:34

## 2018-10-02 RX ADMIN — ACETAMINOPHEN 650 MG: 325 TABLET ORAL at 15:34

## 2018-10-02 RX ADMIN — SPIRONOLACTONE 25 MG: 25 TABLET ORAL at 12:04

## 2018-10-02 RX ADMIN — BUMETANIDE 1 MG: 0.25 INJECTION INTRAMUSCULAR; INTRAVENOUS at 20:18

## 2018-10-02 RX ADMIN — METOCLOPRAMIDE HYDROCHLORIDE 5 MG: 5 SOLUTION ORAL at 18:23

## 2018-10-02 RX ADMIN — CETIRIZINE HYDROCHLORIDE 10 MG: 10 TABLET ORAL at 09:44

## 2018-10-02 RX ADMIN — POLYETHYLENE GLYCOL 3350 17 G: 17 POWDER, FOR SOLUTION ORAL at 09:44

## 2018-10-02 RX ADMIN — SUCRALFATE 1 G: 1 TABLET ORAL at 09:44

## 2018-10-02 RX ADMIN — ONDANSETRON 4 MG: 2 INJECTION INTRAMUSCULAR; INTRAVENOUS at 18:27

## 2018-10-02 RX ADMIN — ACETAMINOPHEN 650 MG: 325 TABLET ORAL at 10:06

## 2018-10-02 RX ADMIN — DIPHENHYDRAMINE HCL 25 MG: 25 TABLET ORAL at 20:23

## 2018-10-02 RX ADMIN — GABAPENTIN 300 MG: 300 CAPSULE ORAL at 09:42

## 2018-10-02 RX ADMIN — THERA TABS 1 TABLET: TAB at 09:44

## 2018-10-02 RX ADMIN — ONDANSETRON 4 MG: 2 INJECTION INTRAMUSCULAR; INTRAVENOUS at 11:40

## 2018-10-02 RX ADMIN — Medication 10 ML: at 20:24

## 2018-10-02 RX ADMIN — PANTOPRAZOLE SODIUM 40 MG: 40 TABLET, DELAYED RELEASE ORAL at 09:41

## 2018-10-02 RX ADMIN — PROMETHAZINE HYDROCHLORIDE 12.5 MG: 25 INJECTION, SOLUTION INTRAMUSCULAR; INTRAVENOUS at 14:30

## 2018-10-02 RX ADMIN — METOCLOPRAMIDE HYDROCHLORIDE 5 MG: 5 SOLUTION ORAL at 14:33

## 2018-10-02 RX ADMIN — MOMETASONE FUROATE AND FORMOTEROL FUMARATE DIHYDRATE 2 PUFF: 100; 5 AEROSOL RESPIRATORY (INHALATION) at 09:19

## 2018-10-02 RX ADMIN — MOMETASONE FUROATE AND FORMOTEROL FUMARATE DIHYDRATE 2 PUFF: 100; 5 AEROSOL RESPIRATORY (INHALATION) at 20:47

## 2018-10-02 RX ADMIN — CYCLOBENZAPRINE HYDROCHLORIDE 5 MG: 5 TABLET, FILM COATED ORAL at 15:26

## 2018-10-02 RX ADMIN — METOCLOPRAMIDE HYDROCHLORIDE 5 MG: 5 SOLUTION ORAL at 20:20

## 2018-10-02 RX ADMIN — SUCRALFATE 1 G: 1 TABLET ORAL at 18:23

## 2018-10-02 RX ADMIN — CYCLOBENZAPRINE HYDROCHLORIDE 5 MG: 5 TABLET, FILM COATED ORAL at 20:20

## 2018-10-02 RX ADMIN — METOCLOPRAMIDE HYDROCHLORIDE 5 MG: 5 SOLUTION ORAL at 09:42

## 2018-10-02 RX ADMIN — GABAPENTIN 300 MG: 300 CAPSULE ORAL at 14:33

## 2018-10-02 RX ADMIN — IPRATROPIUM BROMIDE AND ALBUTEROL SULFATE 3 ML: .5; 3 SOLUTION RESPIRATORY (INHALATION) at 09:19

## 2018-10-02 ASSESSMENT — PAIN DESCRIPTION - FREQUENCY: FREQUENCY: INTERMITTENT

## 2018-10-02 ASSESSMENT — PAIN SCALES - GENERAL
PAINLEVEL_OUTOF10: 4
PAINLEVEL_OUTOF10: 3
PAINLEVEL_OUTOF10: 3
PAINLEVEL_OUTOF10: 5
PAINLEVEL_OUTOF10: 3
PAINLEVEL_OUTOF10: 3
PAINLEVEL_OUTOF10: 0

## 2018-10-02 ASSESSMENT — ENCOUNTER SYMPTOMS
COUGH: 1
ABDOMINAL PAIN: 0
HEMOPTYSIS: 0
SINUS PAIN: 0
NAUSEA: 0
SHORTNESS OF BREATH: 0
WHEEZING: 1
SPUTUM PRODUCTION: 1

## 2018-10-02 ASSESSMENT — PAIN DESCRIPTION - PROGRESSION
CLINICAL_PROGRESSION: NOT CHANGED
CLINICAL_PROGRESSION: GRADUALLY WORSENING
CLINICAL_PROGRESSION: NOT CHANGED

## 2018-10-02 ASSESSMENT — PAIN DESCRIPTION - ONSET
ONSET: ON-GOING
ONSET: GRADUAL
ONSET: GRADUAL

## 2018-10-02 ASSESSMENT — PAIN DESCRIPTION - DESCRIPTORS
DESCRIPTORS: ACHING

## 2018-10-02 ASSESSMENT — PAIN DESCRIPTION - LOCATION
LOCATION: HEAD

## 2018-10-02 ASSESSMENT — PAIN DESCRIPTION - ORIENTATION
ORIENTATION: RIGHT;LEFT

## 2018-10-02 ASSESSMENT — PAIN DESCRIPTION - PAIN TYPE
TYPE: ACUTE PAIN

## 2018-10-02 NOTE — PROGRESS NOTES
[address]    Progress Note    10/2/2018    2:23 PM    Name:   Gary Mina  MRN:     [id number]     Acct:      [de-identified]   Room:   2012/2012-01  IP Day:  4  Admit Date:  9/27/2018  1:37 PM    PCP:   Odette Truong PA-C  Code Status:  Full Code    Subjective:     C/C:   Esophageal stricture  Interval History Status:    EGD with dilatation yesterday, he did okay last night but this morning he could not tolerate the breakfast    Brief History:     The patient is a 71 y.o. male who presents with:   Nausea (n/v x2 days, denies abdominal pain, states took zofran at 1100 with no relief) and Fever (102F fever x1 day, states took tylenol at 0700, afebrile during triage)        Patient was admitted thru ER with:  \"Levi Anguiano is a 71 y. o. male who presents with  Nausea and vomiting for 2 days.  Patient has a surgical history of gastric bypass performed approximately 25 years ago. Debbie Umana has had recent stricture with his bypass site as well as a history of bowel obstruction over the past year. Debbie Umana had a recent upper endoscopy performed approximately one month ago here at [address] by Dr. Jose Ortiz to remove food particles and stricture site.  Patient was told to remain on a liquid diet.  Patient admits to eating scrambled eggs and gravy yesterday, and has since thrown up multiple times.  Patient describes vomitus dark brown in color.  Since yesterday evening.  She has not tolerated by mouth intake including any fluids today.  Patient denies chest pain, shortness of breath, abdominal pain.  Patient has no change in bowel or bladder habits.  Denies rectal bleeding.   Patient also complains of flulike symptoms for 7 days with a productive cough producing scant yellow mucus.  Patient was febrile yesterday evening, with reported temperature of 103 orally.  Patient is taking Tylenol a.m. this morning.  Patient also has headache, muscle aches, sore throat. \"     The patient acknowledges that he continues to have difficulty eating  There has been nausea  He has had emesis of bile/brown material  This morning he coughed up a small amount of dark red blood  He is unable to ascertain the origin - lung, stomach, postnasal drip,  There has been a prior history of hematemesis? He does report his stools have been dark     The patient was admitted  GI was consulted  Gen. surgery was consulted     The patient has a known history of prior stricture/dilation  On 8/13 he underwent EGD:  Procedure(s):  1. EGD removal of foreign body (food bolus)  2. Gastric antrum biopsy       On 8/16 he underwent:  PROCEDURES:   1) Transoral Upper Endoscopy with CRE dilation. Food bolus extraction and dilution and irrigation of Barium contrast (partial concretion present).    EKG:  Atrial fibrillation with premature ventricular or aberrantly conducted complexes  Right bundle branch block  Abnormal ECG  When compared with ECG of 12-AUG-2018 20:36,  No significant change was found    The patient was admitted  GI was consulted  Surgery was consulted  Gastric dilation planned for 10/1    Past Medical History:   has a past medical history of Asthma; Atrial fibrillation ([address] GERD (gastroesophageal reflux disease); Hyperlipidemia; Hypertension; Obesity; Osteoarthritis; and Unspecified sleep apnea. Social History:   reports that he quit smoking about 41 years ago. He has a 20.00 pack-year smoking history. He has never used smokeless tobacco. He reports that he does not drink alcohol or use drugs. Family History:   Family History   Problem Relation Age of Onset    Cancer Mother     Heart Attack Father        Medications: Allergies:     Allergies   Allergen Reactions    Darvocet [Propoxyphene N-Acetaminophen] Hives    Other Hives    Oxycodone-Acetaminophen        Current Meds:   Scheduled Meds:    bumetanide  1 mg Intravenous BID    metoprolol succinate  50 mg Oral Daily    respiratory distress. He has no wheezes. He has rales. Abdominal: Soft. Bowel sounds are normal. He exhibits no distension. There is no tenderness. There is no rebound. Musculoskeletal: He exhibits no edema or tenderness. Neurological: He is alert and oriented to person, place, and time. Skin: Skin is warm and dry. He is not diaphoretic. Data:     I/O (24Hr): Intake/Output Summary (Last 24 hours) at 10/02/18 1423  Last data filed at 10/02/18 1200   Gross per 24 hour   Intake              889 ml   Output             2475 ml   Net            -1586 ml       Labs:    Hematology:  Recent Labs      10/01/18   0629   WBC  4.9   HGB  9.2*   HCT  30.4*   PLT  149     Chemistry:  Recent Labs      10/01/18   0629   NA  138   K  3.8   CL  104   CO2  26   GLUCOSE  96   BUN  5*   CREATININE  0.44*   CALCIUM  8.3*     Recent Labs      10/01/18   0629   PROT  5.7*   LABALBU  2.6*   AST  13   ALT  8   ALKPHOS  112   BILITOT  0.90   BILIDIR  0.56*       Lab Results   Component Value Date/Time    SPECIAL NOT REPORTED 06/22/2018 12:30 PM     Lab Results   Component Value Date/Time    CULTURE NO SIGNIFICANT GROWTH 06/22/2018 12:30 PM       No results found for: POCPH, PHART, PH, POCPCO2, CJD9UMS, PCO2, POCPO2, PO2ART, PO2, POCHCO3, FQE1YME, HCO3, NBEA, PBEA, BEART, BE, THGBART, THB, WQC5BCC, QHNX1OGF, W1VHWYWJ, O2SAT, FIO2    Radiology:      Assessment:        Primary Problem  Principal Problem:    Gastric anastomotic stricture  Active Problems:    Atrial fibrillation (HCC)    GERD (gastroesophageal reflux disease)    Hypertension    MARIAMA on CPAP    Chronic diastolic heart failure (HCC)    Acute vomiting    Gastric outlet obstruction  Resolved Problems:    * No resolved hospital problems.  *      Plan:      Patient could not tolerate breakfast, follow GI recommendations  Patient was hypoxic and needed oxygen, his diuretics were held on admission he was given IV fluids, x-ray shows pulmonary edema, we will give him IV

## 2018-10-03 PROBLEM — J81.0 ACUTE PULMONARY EDEMA (HCC): Status: ACTIVE | Noted: 2018-10-03

## 2018-10-03 LAB
ABSOLUTE EOS #: 0.27 K/UL (ref 0–0.44)
ABSOLUTE IMMATURE GRANULOCYTE: <0.03 K/UL (ref 0–0.3)
ABSOLUTE LYMPH #: 0.97 K/UL (ref 1.1–3.7)
ABSOLUTE MONO #: 0.57 K/UL (ref 0.1–1.2)
ANION GAP SERPL CALCULATED.3IONS-SCNC: 8 MMOL/L (ref 9–17)
BASOPHILS # BLD: 1 % (ref 0–2)
BASOPHILS ABSOLUTE: 0.03 K/UL (ref 0–0.2)
BUN BLDV-MCNC: 7 MG/DL (ref 8–23)
BUN/CREAT BLD: ABNORMAL (ref 9–20)
CALCIUM SERPL-MCNC: 8.6 MG/DL (ref 8.6–10.4)
CHLORIDE BLD-SCNC: 100 MMOL/L (ref 98–107)
CO2: 30 MMOL/L (ref 20–31)
CREAT SERPL-MCNC: 0.49 MG/DL (ref 0.7–1.2)
DIFFERENTIAL TYPE: ABNORMAL
EOSINOPHILS RELATIVE PERCENT: 6 % (ref 1–4)
GFR AFRICAN AMERICAN: >60 ML/MIN
GFR NON-AFRICAN AMERICAN: >60 ML/MIN
GFR SERPL CREATININE-BSD FRML MDRD: ABNORMAL ML/MIN/{1.73_M2}
GFR SERPL CREATININE-BSD FRML MDRD: ABNORMAL ML/MIN/{1.73_M2}
GLUCOSE BLD-MCNC: 96 MG/DL (ref 70–99)
HCT VFR BLD CALC: 29.9 % (ref 40.7–50.3)
HEMOGLOBIN: 9.2 G/DL (ref 13–17)
IMMATURE GRANULOCYTES: 0 %
LYMPHOCYTES # BLD: 21 % (ref 24–43)
MCH RBC QN AUTO: 29.3 PG (ref 25.2–33.5)
MCHC RBC AUTO-ENTMCNC: 30.8 G/DL (ref 28.4–34.8)
MCV RBC AUTO: 95.2 FL (ref 82.6–102.9)
MONOCYTES # BLD: 12 % (ref 3–12)
NRBC AUTOMATED: 0 PER 100 WBC
PDW BLD-RTO: 16 % (ref 11.8–14.4)
PLATELET # BLD: 165 K/UL (ref 138–453)
PLATELET ESTIMATE: ABNORMAL
PMV BLD AUTO: 8.9 FL (ref 8.1–13.5)
POTASSIUM SERPL-SCNC: 3.6 MMOL/L (ref 3.7–5.3)
RBC # BLD: 3.14 M/UL (ref 4.21–5.77)
RBC # BLD: ABNORMAL 10*6/UL
SEG NEUTROPHILS: 60 % (ref 36–65)
SEGMENTED NEUTROPHILS ABSOLUTE COUNT: 2.74 K/UL (ref 1.5–8.1)
SODIUM BLD-SCNC: 138 MMOL/L (ref 135–144)
WBC # BLD: 4.6 K/UL (ref 3.5–11.3)
WBC # BLD: ABNORMAL 10*3/UL

## 2018-10-03 PROCEDURE — 2580000003 HC RX 258: Performed by: STUDENT IN AN ORGANIZED HEALTH CARE EDUCATION/TRAINING PROGRAM

## 2018-10-03 PROCEDURE — 2500000003 HC RX 250 WO HCPCS: Performed by: INTERNAL MEDICINE

## 2018-10-03 PROCEDURE — 6370000000 HC RX 637 (ALT 250 FOR IP): Performed by: INTERNAL MEDICINE

## 2018-10-03 PROCEDURE — 80048 BASIC METABOLIC PNL TOTAL CA: CPT

## 2018-10-03 PROCEDURE — 36415 COLL VENOUS BLD VENIPUNCTURE: CPT

## 2018-10-03 PROCEDURE — 85025 COMPLETE CBC W/AUTO DIFF WBC: CPT

## 2018-10-03 PROCEDURE — 6370000000 HC RX 637 (ALT 250 FOR IP): Performed by: STUDENT IN AN ORGANIZED HEALTH CARE EDUCATION/TRAINING PROGRAM

## 2018-10-03 PROCEDURE — 94640 AIRWAY INHALATION TREATMENT: CPT

## 2018-10-03 PROCEDURE — 1200000000 HC SEMI PRIVATE

## 2018-10-03 PROCEDURE — 99232 SBSQ HOSP IP/OBS MODERATE 35: CPT | Performed by: INTERNAL MEDICINE

## 2018-10-03 PROCEDURE — 94760 N-INVAS EAR/PLS OXIMETRY 1: CPT

## 2018-10-03 RX ORDER — ECHINACEA PURPUREA EXTRACT 125 MG
1 TABLET ORAL PRN
Status: DISCONTINUED | OUTPATIENT
Start: 2018-10-03 | End: 2018-10-04 | Stop reason: HOSPADM

## 2018-10-03 RX ORDER — POTASSIUM CHLORIDE 20MEQ/15ML
20 LIQUID (ML) ORAL ONCE
Status: COMPLETED | OUTPATIENT
Start: 2018-10-03 | End: 2018-10-03

## 2018-10-03 RX ADMIN — SPIRONOLACTONE 25 MG: 25 TABLET ORAL at 08:11

## 2018-10-03 RX ADMIN — GABAPENTIN 300 MG: 300 CAPSULE ORAL at 08:11

## 2018-10-03 RX ADMIN — GABAPENTIN 300 MG: 300 CAPSULE ORAL at 19:54

## 2018-10-03 RX ADMIN — GABAPENTIN 300 MG: 300 CAPSULE ORAL at 14:16

## 2018-10-03 RX ADMIN — METOCLOPRAMIDE HYDROCHLORIDE 5 MG: 5 SOLUTION ORAL at 07:03

## 2018-10-03 RX ADMIN — SUCRALFATE 1 G: 1 TABLET ORAL at 14:16

## 2018-10-03 RX ADMIN — POTASSIUM CHLORIDE 20 MEQ: 40 SOLUTION ORAL at 11:09

## 2018-10-03 RX ADMIN — DIPHENHYDRAMINE HCL 25 MG: 25 TABLET ORAL at 20:35

## 2018-10-03 RX ADMIN — MOMETASONE FUROATE AND FORMOTEROL FUMARATE DIHYDRATE 2 PUFF: 100; 5 AEROSOL RESPIRATORY (INHALATION) at 09:55

## 2018-10-03 RX ADMIN — POLYETHYLENE GLYCOL 3350 17 G: 17 POWDER, FOR SOLUTION ORAL at 08:11

## 2018-10-03 RX ADMIN — SUCRALFATE 1 G: 1 TABLET ORAL at 18:53

## 2018-10-03 RX ADMIN — Medication 10 ML: at 20:23

## 2018-10-03 RX ADMIN — BUMETANIDE 1 MG: 0.25 INJECTION INTRAMUSCULAR; INTRAVENOUS at 08:11

## 2018-10-03 RX ADMIN — METOCLOPRAMIDE HYDROCHLORIDE 5 MG: 5 SOLUTION ORAL at 19:54

## 2018-10-03 RX ADMIN — MOMETASONE FUROATE AND FORMOTEROL FUMARATE DIHYDRATE 2 PUFF: 100; 5 AEROSOL RESPIRATORY (INHALATION) at 19:46

## 2018-10-03 RX ADMIN — ACETAMINOPHEN 650 MG: 325 TABLET ORAL at 02:44

## 2018-10-03 RX ADMIN — SUCRALFATE 1 G: 1 TABLET ORAL at 19:54

## 2018-10-03 RX ADMIN — PANTOPRAZOLE SODIUM 40 MG: 40 TABLET, DELAYED RELEASE ORAL at 08:11

## 2018-10-03 RX ADMIN — CETIRIZINE HYDROCHLORIDE 10 MG: 10 TABLET ORAL at 08:11

## 2018-10-03 RX ADMIN — METOCLOPRAMIDE HYDROCHLORIDE 5 MG: 5 SOLUTION ORAL at 18:53

## 2018-10-03 RX ADMIN — BUMETANIDE 1 MG: 0.25 INJECTION INTRAMUSCULAR; INTRAVENOUS at 19:54

## 2018-10-03 RX ADMIN — Medication 10 ML: at 08:12

## 2018-10-03 RX ADMIN — CYCLOBENZAPRINE HYDROCHLORIDE 5 MG: 5 TABLET, FILM COATED ORAL at 20:24

## 2018-10-03 RX ADMIN — THERA TABS 1 TABLET: TAB at 08:11

## 2018-10-03 RX ADMIN — SUCRALFATE 1 G: 1 TABLET ORAL at 08:11

## 2018-10-03 RX ADMIN — METOCLOPRAMIDE HYDROCHLORIDE 5 MG: 5 SOLUTION ORAL at 11:09

## 2018-10-03 ASSESSMENT — ENCOUNTER SYMPTOMS
NAUSEA: 0
WHEEZING: 0
COUGH: 0
SPUTUM PRODUCTION: 0
HEMOPTYSIS: 0
SINUS PAIN: 0
SHORTNESS OF BREATH: 0
ABDOMINAL PAIN: 0

## 2018-10-03 ASSESSMENT — PAIN SCALES - GENERAL
PAINLEVEL_OUTOF10: 3
PAINLEVEL_OUTOF10: 4
PAINLEVEL_OUTOF10: 1

## 2018-10-03 ASSESSMENT — PAIN DESCRIPTION - LOCATION: LOCATION: ABDOMEN

## 2018-10-03 NOTE — PLAN OF CARE
Problem: RESPIRATORY  Intervention: Administer treatments as ordered  BRONCHOSPASM/BRONCHOCONSTRICTION     [x]         IMPROVE AERATION/BREATH SOUNDS  [x]   ADMINISTER BRONCHODILATOR THERAPY AS APPROPRIATE  [x]   ASSESS BREATH SOUNDS  []   IMPLEMENT AEROSOL/MDI PROTOCOL  [x]   PATIENT EDUCATION AS NEEDED

## 2018-10-03 NOTE — PROGRESS NOTES
morning.  Patient also has headache, muscle aches, sore throat. \"     The patient acknowledges that he continues to have difficulty eating  There has been nausea  He has had emesis of bile/brown material  This morning he coughed up a small amount of dark red blood  He is unable to ascertain the origin - lung, stomach, postnasal drip,  There has been a prior history of hematemesis? He does report his stools have been dark     The patient was admitted  GI was consulted  Gen. surgery was consulted     The patient has a known history of prior stricture/dilation  On 8/13 he underwent EGD:  Procedure(s):  1. EGD removal of foreign body (food bolus)  2. Gastric antrum biopsy       On 8/16 he underwent:  PROCEDURES:   1) Transoral Upper Endoscopy with CRE dilation. Food bolus extraction and dilution and irrigation of Barium contrast (partial concretion present).    EKG:  Atrial fibrillation with premature ventricular or aberrantly conducted complexes  Right bundle branch block  Abnormal ECG  When compared with ECG of 12-AUG-2018 20:36,  No significant change was found    The patient was admitted  GI was consulted  Surgery was consulted  Gastric dilation planned for 10/1    Past Medical History:   has a past medical history of Asthma; Atrial fibrillation ([address] GERD (gastroesophageal reflux disease); Hyperlipidemia; Hypertension; Obesity; Osteoarthritis; and Unspecified sleep apnea. Social History:   reports that he quit smoking about 41 years ago. He has a 20.00 pack-year smoking history. He has never used smokeless tobacco. He reports that he does not drink alcohol or use drugs. Family History:   Family History   Problem Relation Age of Onset    Cancer Mother     Heart Attack Father        Medications: Allergies:     Allergies   Allergen Reactions    Darvocet [Propoxyphene N-Acetaminophen] Hives    Other Hives    Oxycodone-Acetaminophen        Current Meds:   Scheduled Meds:    bumetanide  1 mg Intravenous BID    metoprolol succinate  50 mg Oral Daily    spironolactone  25 mg Oral Daily    fluticasone  1 spray Each Nare Daily    sodium chloride flush  10 mL Intravenous 2 times per day    mometasone-formoterol  2 puff Inhalation BID    Armodafinil  200 mg Mouth/Throat QAM    polyethylene glycol  17 g Oral Daily    gabapentin  300 mg Oral TID    metoclopramide  5 mg Oral 4x Daily AC & HS    sucralfate  1 g Oral 4x Daily    cetirizine  10 mg Oral Daily    pantoprazole  40 mg Oral QAM AC    multivitamin  1 tablet Oral Daily     Continuous Infusions:     PRN Meds: sodium chloride, diphenhydrAMINE, doxyLAMINE succinate, sodium chloride flush, morphine **OR** morphine, acetaminophen, ondansetron, promethazine, albuterol sulfate HFA, albuterol, albuterol, cyclobenzaprine, ipratropium-albuterol      Review of Systems:     Review of Systems   Constitutional: Negative for chills and fever. HENT: Positive for hearing loss. Negative for congestion, nosebleeds and sinus pain. Respiratory: Negative for cough, hemoptysis, sputum production, shortness of breath and wheezing. Cardiovascular: Negative for chest pain and palpitations. Gastrointestinal: Negative for abdominal pain and nausea. Genitourinary: Negative for flank pain and hematuria. Musculoskeletal: Negative for falls and joint pain. Physical Examination:        Vitals:  BP (!) 104/55   Pulse 70   Temp 97.7 °F (36.5 °C) (Oral)   Resp 16   Ht 5' 10.5\" (1.791 m)   Wt 252 lb 12.8 oz (114.7 kg)   SpO2 96%   BMI 35.76 kg/m²   Temp (24hrs), Av °F (36.7 °C), Min:97.6 °F (36.4 °C), Max:98.8 °F (37.1 °C)    No results for input(s): POCGLU in the last 72 hours. Physical Exam   Constitutional: He is oriented to person, place, and time. No distress. HENT:   Head: Normocephalic and atraumatic. Eyes: Conjunctivae are normal. No scleral icterus. Neck: Neck supple. Cardiovascular: Normal rate.     HIRR  Rate controlled

## 2018-10-04 ENCOUNTER — HOSPITAL ENCOUNTER (OUTPATIENT)
Dept: PHYSICAL THERAPY | Facility: CLINIC | Age: 73
Setting detail: THERAPIES SERIES
End: 2018-10-04
Payer: COMMERCIAL

## 2018-10-04 VITALS
DIASTOLIC BLOOD PRESSURE: 57 MMHG | TEMPERATURE: 97.9 F | HEIGHT: 71 IN | SYSTOLIC BLOOD PRESSURE: 102 MMHG | HEART RATE: 64 BPM | WEIGHT: 247.2 LBS | RESPIRATION RATE: 16 BRPM | OXYGEN SATURATION: 99 % | BODY MASS INDEX: 34.61 KG/M2

## 2018-10-04 PROBLEM — J81.0 ACUTE PULMONARY EDEMA (HCC): Status: RESOLVED | Noted: 2018-10-03 | Resolved: 2018-10-04

## 2018-10-04 PROBLEM — K92.0 HEMATEMESIS: Status: RESOLVED | Noted: 2018-08-13 | Resolved: 2018-10-04

## 2018-10-04 PROBLEM — K25.0 ACUTE GASTRIC ULCER WITH BLEEDING: Status: RESOLVED | Noted: 2018-08-13 | Resolved: 2018-10-04

## 2018-10-04 PROBLEM — K56.7 ILEUS (HCC): Status: RESOLVED | Noted: 2018-05-25 | Resolved: 2018-10-04

## 2018-10-04 PROBLEM — R11.10 ACUTE VOMITING: Status: RESOLVED | Noted: 2018-09-27 | Resolved: 2018-10-04

## 2018-10-04 LAB
ABSOLUTE EOS #: 0.34 K/UL (ref 0–0.44)
ABSOLUTE IMMATURE GRANULOCYTE: <0.03 K/UL (ref 0–0.3)
ABSOLUTE LYMPH #: 1.07 K/UL (ref 1.1–3.7)
ABSOLUTE MONO #: 0.47 K/UL (ref 0.1–1.2)
ANION GAP SERPL CALCULATED.3IONS-SCNC: 10 MMOL/L (ref 9–17)
BASOPHILS # BLD: 1 % (ref 0–2)
BASOPHILS ABSOLUTE: 0.03 K/UL (ref 0–0.2)
BUN BLDV-MCNC: 7 MG/DL (ref 8–23)
BUN/CREAT BLD: ABNORMAL (ref 9–20)
CALCIUM SERPL-MCNC: 9.1 MG/DL (ref 8.6–10.4)
CHLORIDE BLD-SCNC: 99 MMOL/L (ref 98–107)
CO2: 31 MMOL/L (ref 20–31)
CREAT SERPL-MCNC: 0.64 MG/DL (ref 0.7–1.2)
DIFFERENTIAL TYPE: ABNORMAL
EOSINOPHILS RELATIVE PERCENT: 8 % (ref 1–4)
GFR AFRICAN AMERICAN: >60 ML/MIN
GFR NON-AFRICAN AMERICAN: >60 ML/MIN
GFR SERPL CREATININE-BSD FRML MDRD: ABNORMAL ML/MIN/{1.73_M2}
GFR SERPL CREATININE-BSD FRML MDRD: ABNORMAL ML/MIN/{1.73_M2}
GLUCOSE BLD-MCNC: 96 MG/DL (ref 70–99)
HCT VFR BLD CALC: 32.2 % (ref 40.7–50.3)
HEMOGLOBIN: 10.2 G/DL (ref 13–17)
IMMATURE GRANULOCYTES: 1 %
LYMPHOCYTES # BLD: 24 % (ref 24–43)
MCH RBC QN AUTO: 30.8 PG (ref 25.2–33.5)
MCHC RBC AUTO-ENTMCNC: 31.7 G/DL (ref 28.4–34.8)
MCV RBC AUTO: 97.3 FL (ref 82.6–102.9)
MONOCYTES # BLD: 11 % (ref 3–12)
NRBC AUTOMATED: 0 PER 100 WBC
PDW BLD-RTO: 15.9 % (ref 11.8–14.4)
PLATELET # BLD: 316 K/UL (ref 138–453)
PLATELET ESTIMATE: ABNORMAL
PMV BLD AUTO: 10.2 FL (ref 8.1–13.5)
POTASSIUM SERPL-SCNC: 4.3 MMOL/L (ref 3.7–5.3)
RBC # BLD: 3.31 M/UL (ref 4.21–5.77)
RBC # BLD: ABNORMAL 10*6/UL
SEG NEUTROPHILS: 56 % (ref 36–65)
SEGMENTED NEUTROPHILS ABSOLUTE COUNT: 2.51 K/UL (ref 1.5–8.1)
SODIUM BLD-SCNC: 140 MMOL/L (ref 135–144)
WBC # BLD: 4.4 K/UL (ref 3.5–11.3)
WBC # BLD: ABNORMAL 10*3/UL

## 2018-10-04 PROCEDURE — 85025 COMPLETE CBC W/AUTO DIFF WBC: CPT

## 2018-10-04 PROCEDURE — 2500000003 HC RX 250 WO HCPCS: Performed by: INTERNAL MEDICINE

## 2018-10-04 PROCEDURE — 99239 HOSP IP/OBS DSCHRG MGMT >30: CPT | Performed by: INTERNAL MEDICINE

## 2018-10-04 PROCEDURE — 94640 AIRWAY INHALATION TREATMENT: CPT

## 2018-10-04 PROCEDURE — 6370000000 HC RX 637 (ALT 250 FOR IP): Performed by: INTERNAL MEDICINE

## 2018-10-04 PROCEDURE — 6370000000 HC RX 637 (ALT 250 FOR IP): Performed by: STUDENT IN AN ORGANIZED HEALTH CARE EDUCATION/TRAINING PROGRAM

## 2018-10-04 PROCEDURE — 2580000003 HC RX 258: Performed by: STUDENT IN AN ORGANIZED HEALTH CARE EDUCATION/TRAINING PROGRAM

## 2018-10-04 PROCEDURE — 36415 COLL VENOUS BLD VENIPUNCTURE: CPT

## 2018-10-04 PROCEDURE — 94762 N-INVAS EAR/PLS OXIMTRY CONT: CPT

## 2018-10-04 PROCEDURE — 6370000000 HC RX 637 (ALT 250 FOR IP): Performed by: NURSE PRACTITIONER

## 2018-10-04 PROCEDURE — 80048 BASIC METABOLIC PNL TOTAL CA: CPT

## 2018-10-04 RX ORDER — POTASSIUM CHLORIDE 20 MEQ/1
TABLET, EXTENDED RELEASE ORAL
Qty: 1 TABLET | Refills: 0
Start: 2018-10-04 | End: 2018-11-16 | Stop reason: SDUPTHER

## 2018-10-04 RX ADMIN — PANTOPRAZOLE SODIUM 40 MG: 40 TABLET, DELAYED RELEASE ORAL at 08:30

## 2018-10-04 RX ADMIN — THERA TABS 1 TABLET: TAB at 08:28

## 2018-10-04 RX ADMIN — SUCRALFATE 1 G: 1 TABLET ORAL at 14:29

## 2018-10-04 RX ADMIN — Medication 10 ML: at 08:29

## 2018-10-04 RX ADMIN — METOCLOPRAMIDE HYDROCHLORIDE 5 MG: 5 SOLUTION ORAL at 11:00

## 2018-10-04 RX ADMIN — CETIRIZINE HYDROCHLORIDE 10 MG: 10 TABLET ORAL at 08:28

## 2018-10-04 RX ADMIN — POLYETHYLENE GLYCOL 3350 17 G: 17 POWDER, FOR SOLUTION ORAL at 08:28

## 2018-10-04 RX ADMIN — SUCRALFATE 1 G: 1 TABLET ORAL at 08:28

## 2018-10-04 RX ADMIN — MOMETASONE FUROATE AND FORMOTEROL FUMARATE DIHYDRATE 2 PUFF: 100; 5 AEROSOL RESPIRATORY (INHALATION) at 08:09

## 2018-10-04 RX ADMIN — GABAPENTIN 300 MG: 300 CAPSULE ORAL at 08:28

## 2018-10-04 RX ADMIN — METOCLOPRAMIDE HYDROCHLORIDE 5 MG: 5 SOLUTION ORAL at 08:28

## 2018-10-04 RX ADMIN — SPIRONOLACTONE 25 MG: 25 TABLET ORAL at 08:28

## 2018-10-04 RX ADMIN — FLUTICASONE PROPIONATE 1 SPRAY: 50 SPRAY, METERED NASAL at 08:28

## 2018-10-04 RX ADMIN — BUMETANIDE 1 MG: 0.25 INJECTION INTRAMUSCULAR; INTRAVENOUS at 08:29

## 2018-10-04 RX ADMIN — GABAPENTIN 300 MG: 300 CAPSULE ORAL at 14:38

## 2018-10-04 RX ADMIN — METOPROLOL SUCCINATE 50 MG: 50 TABLET, FILM COATED, EXTENDED RELEASE ORAL at 08:28

## 2018-10-04 RX ADMIN — ALBUTEROL SULFATE 2 MG: 2 TABLET ORAL at 08:28

## 2018-10-04 NOTE — PROGRESS NOTES
General Surgery:  Daily Progress Note                  PATIENT NAME: Maribel Hughes     TODAY'S DATE: 10/4/2018, 5:53 AM    SUBJECTIVE:     Pt seen and examined at bedside. Did well with FLD, but did not do well with pureed diet last night. Denies F/C. No other complaints noted. OBJECTIVE:   VITALS:  /78   Pulse 70   Temp 97.4 °F (36.3 °C) (Oral)   Resp 17   Ht 5' 10.5\" (1.791 m)   Wt 247 lb 3.2 oz (112.1 kg)   SpO2 99%   BMI 34.97 kg/m²      INTAKE/OUTPUT:      Intake/Output Summary (Last 24 hours) at 10/04/18 0553  Last data filed at 10/03/18 1528   Gross per 24 hour   Intake              530 ml   Output             4600 ml   Net            -4070 ml       PHYSICAL EXAM:  General Appearance: awake, alert, oriented, in no acute distress  HEENT:  Normocephalic, atraumatic, mucus membranes moist   Heart: Heart regular rate and rhythm  Lungs: Normal expansion. Clear to auscultation. No rales, rhonchi, or wheezing. Abdomen: soft, ND, no TTP  Extremities: No cyanosis, pitting edema, rashes noted. Skin: Skin color, texture, turgor normal. No rashes or lesions.      Data:  CBC with Differential:    Lab Results   Component Value Date    WBC 4.6 10/03/2018    RBC 3.14 10/03/2018    RBC 4.39 09/13/2011    HGB 9.2 10/03/2018    HCT 29.9 10/03/2018     10/03/2018     09/13/2011    MCV 95.2 10/03/2018    MCH 29.3 10/03/2018    MCHC 30.8 10/03/2018    RDW 16.0 10/03/2018    LYMPHOPCT 21 10/03/2018    LYMPHOPCT 21.2 10/30/2013    MONOPCT 12 10/03/2018    MONOPCT 7.9 10/30/2013    EOSPCT 7.9 10/30/2013    BASOPCT 1 10/03/2018    BASOPCT 0.7 10/30/2013    MONOSABS 0.57 10/03/2018    MONOSABS 0.5 10/30/2013    LYMPHSABS 0.97 10/03/2018    LYMPHSABS 1.4 10/30/2013    EOSABS 0.27 10/03/2018    EOSABS 0.5 10/30/2013    BASOSABS 0.03 10/03/2018    DIFFTYPE NOT REPORTED 10/03/2018     CMP:    Lab Results   Component Value Date     10/03/2018    K 3.6 10/03/2018     10/03/2018    CO2 30

## 2018-10-04 NOTE — DISCHARGE SUMMARY
the distal gastric staple line, of uncertain significance. Functional versus structural stenosis. Nuclear medicine gastric emptying study or upper GI series may be helpful. Bibasilar atelectasis versus pneumonia or aspiration pneumonitis. Cholelithiasis. Mild fatty infiltration of the liver. Xr Chest Portable    Result Date: 9/27/2018  EXAMINATION: SINGLE XRAY VIEW OF THE CHEST 9/27/2018 2:28 pm COMPARISON: 08/12/2018 HISTORY: ORDERING SYSTEM PROVIDED HISTORY: cough fever TECHNOLOGIST PROVIDED HISTORY: cough fever FINDINGS: Stable cardiomediastinal contours. Chronically elevated right hemidiaphragm also unchanged. No focal airspace consolidation. No pneumothorax or pleural effusion. No acute findings or significant interval change. Xr Abdomen (2 Views)    Result Date: 9/27/2018  EXAMINATION: 4 XRAY VIEWS OF THE ABDOMEN 9/27/2018 2:31 pm COMPARISON: None. HISTORY: ORDERING SYSTEM PROVIDED HISTORY: r/o obstruction TECHNOLOGIST PROVIDED HISTORY: r/o obstruction FINDINGS: Suture material projects over the left upper quadrant. Small bowel and colon have scattered bowel gas, without significant distention. There is a moderate amount of stool projecting over the right colon. There is no free air identified. Possible constipation with prominence of stool in the ascending colon. No obstruction is evident. Pattern is overall nonspecific. Consultations:    Consults:     Final Specialist Recommendations/Findings:   IP CONSULT TO BARIATRICS  IP CONSULT TO BARIATRICS  IP CONSULT TO GI  IP CONSULT TO CARDIOLOGY      The patient was seen and examined on day of discharge and this discharge summary is in conjunction with any daily progress note from day of discharge.     Discharge plan:     Disposition: Home    Physician Follow Up:     ADELINE Faustin [facility]  [phone]    In 1 week      [facility]  [address] [facility] [address]  [phone]    In 1 week      Milan Bowser MD  [address] [facility]  [phone]    In 2 weeks         Requiring Further Evaluation/Follow Up POST HOSPITALIZATION/Incidental Findings: PCP to monitor BMP    Diet: full liquid diet    Activity: As tolerated        Discharge Medications:      Medication List      CHANGE how you take these medications    * albuterol sulfate  (90 Base) MCG/ACT inhaler  What changed:  · Another medication with the same name was changed. Make sure you understand how and when to take each. · Another medication with the same name was removed. Continue taking this medication, and follow the directions you see here. * albuterol 2 MG tablet  Commonly known as:  PROVENTIL  Use as needed  What changed:  · See the new instructions. · Another medication with the same name was removed. Continue taking this medication, and follow the directions you see here. potassium chloride 20 MEQ extended release tablet  Commonly known as:  KLOR-CON M20  Take 1 tablet by mouth daily and follow with your family doctor  What changed:  additional instructions        * This list has 2 medication(s) that are the same as other medications prescribed for you. Read the directions carefully, and ask your doctor or other care provider to review them with you. CONTINUE taking these medications    Armodafinil 200 MG Tabs     bumetanide 1 MG tablet  Commonly known as:  BUMEX  Take 1 tablet by mouth 2 times daily     cetirizine 10 MG tablet  Commonly known as:  ZYRTEC  TAKE 1 TABLET BY MOUTH DAILY     ciclopirox 8 % solution  Commonly known as:  PENLAC  Apply topically nightly to nails     cyclobenzaprine 10 MG tablet  Commonly known as:  FLEXERIL  TAKE 1 TABLET BY MOUTH DAILY AT BEDTIME AS NEEDED     gabapentin 300 MG capsule  Commonly known as:  NEURONTIN  TAKE ONE CAPSULE BY MOUTH 3 TIMES A DAY. Incontinence Brief Large Misc  To use as directed.  Use 3x a

## 2018-10-05 ENCOUNTER — TELEPHONE (OUTPATIENT)
Dept: GASTROENTEROLOGY | Age: 73
End: 2018-10-05

## 2018-10-05 ENCOUNTER — CARE COORDINATION (OUTPATIENT)
Dept: CASE MANAGEMENT | Age: 73
End: 2018-10-05

## 2018-10-05 ENCOUNTER — TELEPHONE (OUTPATIENT)
Dept: BARIATRICS/WEIGHT MGMT | Age: 73
End: 2018-10-05

## 2018-10-05 DIAGNOSIS — K31.89 GASTRIC ANASTOMOTIC STRICTURE: Primary | ICD-10-CM

## 2018-10-05 DIAGNOSIS — K92.9 GASTRIC ANASTOMOTIC STRICTURE: Primary | ICD-10-CM

## 2018-10-05 NOTE — CARE COORDINATION
[facility] Transitions Initial Follow Up Call    Call within 2 business days of discharge: Yes    Patient: Traci Godinez Patient : 1945   MRN: [id number]  Reason for Admission:   Gastric anastomotic stricture  Discharge Date: 10/4/18 RARS: Readmission Risk Score: 21, CM 15     Spoke with: patient's spouse - patient is sleeping  Facility: [facility]    Non-face-to-face services provided:  Scheduled appointment with PCP-10/8 - I changed to transitional visit  Scheduled appointment with Specialist-10/11 - Dr Teresa Salas, bariatric  Obtained and reviewed discharge summary and/or continuity of care documents  Assessment and support for treatment adherence and medication management-reviewed with spouse   1111F completed    Patient's wife reports patient is doing well, no new symptoms. No further nausea, vomiting. Bowels moving without difficulty. Tolerating foods & fluids well. Aware of all upcoming appointments. Noted that Dr Danny Denton office is assisting with timing of GI appt - Dr Dana Landau  Receptive to Care Transitions, CTC contact number given.       Care Transitions 24 Hour Call    Schedule Follow Up Appointment with PCP:  Completed  Do you have any ongoing symptoms?:  No  Do you have a copy of your discharge instructions?:  Yes  Do you have all of your prescriptions and are they filled?:  Yes  Have you been contacted by a [facility] Pharmacist?:  No  Have you scheduled your follow up appointment?:  Yes (Comment: 10/8 transitional, 10/11 bariatric)  How are you going to get to your appointment?:  Other (Comment: medical cab)  Were you discharged with any Home Care or Post Acute Services:  No  Do you feel like you have everything you need to keep you well at home?:  Yes  Care Transitions Interventions     Other Services:  (Comment: Call to PCP, attempt to arrange appointment within parameters for medical transport. )      Transportation Support:  Completed          Follow Up: 10/8, 10/11Future Appointments  Date

## 2018-10-08 ENCOUNTER — OFFICE VISIT (OUTPATIENT)
Dept: FAMILY MEDICINE CLINIC | Age: 73
End: 2018-10-08
Payer: COMMERCIAL

## 2018-10-08 ENCOUNTER — HOSPITAL ENCOUNTER (OUTPATIENT)
Dept: PHYSICAL THERAPY | Facility: CLINIC | Age: 73
Setting detail: THERAPIES SERIES
Discharge: HOME | End: 2018-10-08
Payer: COMMERCIAL

## 2018-10-08 VITALS
OXYGEN SATURATION: 97 % | HEIGHT: 68 IN | BODY MASS INDEX: 35.04 KG/M2 | DIASTOLIC BLOOD PRESSURE: 68 MMHG | RESPIRATION RATE: 20 BRPM | HEART RATE: 61 BPM | SYSTOLIC BLOOD PRESSURE: 105 MMHG | WEIGHT: 231.2 LBS

## 2018-10-08 DIAGNOSIS — K92.9 GASTRIC ANASTOMOTIC STRICTURE: Primary | ICD-10-CM

## 2018-10-08 DIAGNOSIS — K31.89 GASTRIC ANASTOMOTIC STRICTURE: Primary | ICD-10-CM

## 2018-10-08 DIAGNOSIS — R11.0 NAUSEA: ICD-10-CM

## 2018-10-08 DIAGNOSIS — K21.9 GASTROESOPHAGEAL REFLUX DISEASE WITHOUT ESOPHAGITIS: ICD-10-CM

## 2018-10-08 DIAGNOSIS — S68.110A AMPUTATION OF RIGHT INDEX FINGER: ICD-10-CM

## 2018-10-08 DIAGNOSIS — I89.0 LYMPHEDEMA OF BOTH LOWER EXTREMITIES: ICD-10-CM

## 2018-10-08 PROCEDURE — 99213 OFFICE O/P EST LOW 20 MIN: CPT | Performed by: PHYSICIAN ASSISTANT

## 2018-10-08 PROCEDURE — 97110 THERAPEUTIC EXERCISES: CPT

## 2018-10-08 NOTE — FLOWSHEET NOTE
endurance to activity. MET 7/30  2. ? Function: demonstrate sitting upright in chair with scapular retraction. 3.     4. Independent with Home Exercise Programs  Demonstrate Knowledge of fall prevention 7-16-18 met, issued and educated on fall prevention.     LTG: (to be met in 10 treatments)  1. Improve LEFI score, by 1 level for a (usual housework) - unmet, d (walking between rooms) - me tand J (in and out of car - unmet, same)  2. Decrease pain from 4/10 to 2/10 maximum, 8/30/18  3. progressing, 3/10 in left knee  4. Up and down 5 steps safely, 1 rail to simulate getting in and out of home. ONGOING  5. Improve TUG score by 6 seconds, partially met, improved by 3 seconds, 8/30/18    ADDITIONAL LTG\"s continue to address unmet goals above, focus PT on LE balance, endurance, ADL functioning and gait stability    Pt. Education:  [x] Yes  [] No  [x] Reviewed Prior HEP/Ed  Method of Education: [x] Verbal  [] Demo  [] Written:   Comprehension of Education:  [x] Verbalizes understanding. [x] Demonstrates understanding. [] Needs review. [x] Demonstrates/verbalizes HEP/Ed previously given. Plan: [x] Continue per plan of care.    [] Other:      Time In: 2:45pm           Time Out:  3:35pm    Electronically signed by:  Briana Cordoba PTA

## 2018-10-08 NOTE — PROGRESS NOTES
times daily 30 tablet 3    metoclopramide (REGLAN) 5 MG/5ML solution Take 5 mLs by mouth 4 times daily (before meals and nightly) (Patient taking differently: Take 5 mg by mouth 4 times daily (before meals and nightly) ) 750 mL 3    sucralfate (CARAFATE) 1 GM tablet Take 1 tablet by mouth 4 times daily 120 tablet 3    Incontinence Supply Disposable (INCONTINENCE BRIEF LARGE) MISC To use as directed. Use 3x a day 90 each 3    polyethylene glycol (GLYCOLAX) powder Take 17 g by mouth daily 527 g 3    rivaroxaban (XARELTO) 20 MG TABS tablet Take 1 tablet by mouth daily (with breakfast) 30 tablet 2    gabapentin (NEURONTIN) 300 MG capsule TAKE ONE CAPSULE BY MOUTH 3 TIMES A DAY. 270 capsule 1    ondansetron (ZOFRAN-ODT) 4 MG disintegrating tablet Take 4 mg by mouth      ipratropium-albuterol (DUONEB) 0.5-2.5 (3) MG/3ML SOLN nebulizer solution Inhale 1 vial into the lungs every 4 hours      Armodafinil 200 MG TABS Take 1 tablet by mouth every morning. Lorenzo Gails ciclopirox (PENLAC) 8 % solution Apply topically nightly to nails 6 mL 11    SYMBICORT 160-4.5 MCG/ACT AERO       albuterol (PROVENTIL HFA;VENTOLIN HFA) 108 (90 BASE) MCG/ACT inhaler Inhale 2 puffs into the lungs every 6 hours as needed for Wheezing. No current facility-administered medications for this visit. Social History   Substance Use Topics    Smoking status: Former Smoker     Packs/day: 1.00     Years: 20.00     Quit date: 12/14/1976    Smokeless tobacco: Never Used    Alcohol use No       Family History   Problem Relation Age of Onset    Cancer Mother     Heart Attack Father         REVIEW OF SYSTEMS:  Review of Systems   Constitutional: Negative for chills and fever. HENT: Negative for congestion. Respiratory: Negative for cough and shortness of breath. Cardiovascular: Positive for leg swelling. Negative for chest pain. Gastrointestinal: Negative for constipation, diarrhea, nausea and vomiting.    Genitourinary: Negative for dysuria, frequency and urgency. Musculoskeletal: Negative for back pain, joint pain, myalgias and neck pain. Neurological: Negative for headaches. PHYSICAL EXAM:  Vitals:    10/08/18 0845   BP: 105/68   Site: Right Upper Arm   Position: Sitting   Cuff Size: Medium Adult   Pulse: 61   Resp: 20   SpO2: 97%   Weight: 231 lb 3.2 oz (104.9 kg)   Height: 5' 7.5\" (1.715 m)     BP Readings from Last 3 Encounters:   10/08/18 105/68   10/04/18 (!) 102/57   10/01/18 (!) 156/79        Physical Exam   Constitutional: He is oriented to person, place, and time and well-developed, well-nourished, and in no distress. HENT:   Head: Normocephalic and atraumatic. Eyes: Pupils are equal, round, and reactive to light. Cardiovascular: Normal rate, regular rhythm and normal heart sounds. Pulmonary/Chest: Effort normal and breath sounds normal.   Abdominal: Soft. He exhibits no mass. There is no tenderness. Musculoskeletal: He exhibits no tenderness. Right ankle: He exhibits swelling. Left ankle: He exhibits swelling. Right lower leg: He exhibits swelling. Left lower leg: He exhibits swelling. Neurological: He is alert and oriented to person, place, and time. Gait normal.   Skin: Skin is warm and dry.          DIAGNOSTIC FINDINGS:  CBC:  Lab Results   Component Value Date    WBC 4.4 10/04/2018    HGB 10.2 10/04/2018     10/04/2018     09/13/2011       BMP:    Lab Results   Component Value Date     10/04/2018    K 4.3 10/04/2018    CL 99 10/04/2018    CO2 31 10/04/2018    BUN 7 10/04/2018    CREATININE 0.64 10/04/2018    GLUCOSE 96 10/04/2018    GLUCOSE 99 09/13/2011       HEMOGLOBIN A1C: No results found for: LABA1C    FASTING LIPID PANEL:  Lab Results   Component Value Date    CHOL 157 07/06/2018    HDL 52 07/06/2018    TRIG 101 07/06/2018       ASSESSMENT AND PLAN:  Wayne Gitelman was seen today for follow-up from hospital.    Diagnoses and all orders for this visit:    Gastric anastomotic stricture    Nausea    Gastroesophageal reflux disease without esophagitis    Lymphedema of both lower extremities    Amputation of right index finger      FOLLOW UP AND INSTRUCTIONS:  Return in 2 months (on 12/8/2018) for CHF, Edema, GERD, Obesity. · Discussed use, benefit, and side effects of prescribed medications. Barriers to medication compliance addressed. All patient questions answered. Pt voiced understanding. · Patient given educational materials - see patient instructions    Electronically signed by Jaspal Weeks PA-C on 10/8/18 at 9:11 AM    This note is created with the assistance of a speech-recognition program. While intending to generate a document that actually reflects the content of the visit, the document can still have some mistakes which may not have been identified and corrected by editing.

## 2018-10-08 NOTE — PROGRESS NOTES
Visit Information    Have you changed or started any medications since your last visit including any over-the-counter medicines, vitamins, or herbal medicines? no   Have you stopped taking any of your medications? Is so, why? -  no  Are you having any side effects from any of your medications? - no    Have you seen any other physician or provider since your last visit? yes    Have you had any other diagnostic tests since your last visit? yes    Have you been seen in the emergency room and/or had an admission in a hospital since we [facility]   Have you had your routine dental cleaning in the past 6 months?  no     Do you have an active MyChart account? If no, what is the barrier?   Yes    Patient Care Team:  Lalita Ambrosio PA-C as PCP - General (Physician Assistant)  Lalita Ambrosio PA-C as PCP - S Attributed Provider  Angelika Beyer MD as Consulting Physician (Gastroenterology)  [place], [place] as Care Transition  Charity Akbar RN as Care Transition  Leland Corrales RN as Care Transition    Medical History Review  Past Medical, Family, and Social History reviewed and does not contribute to the patient presenting condition    Health Maintenance   Topic Date Due    Flu vaccine (1) 09/01/2018    Shingles Vaccine (1 of 2 - 2 Dose Series) 04/03/2019 (Originally 10/31/1996)    DTaP/Tdap/Td vaccine (1 - Tdap) 04/10/2019 (Originally 10/31/1965)    Pneumococcal low/med risk (2 of 2 - PPSV23) 08/17/2019    Potassium monitoring  10/04/2019    Creatinine monitoring  10/04/2019    Colon cancer screen colonoscopy  03/26/2023    Lipid screen  07/06/2023    AAA screen  Completed    Hepatitis C screen  Completed

## 2018-10-10 ENCOUNTER — HOSPITAL ENCOUNTER (OUTPATIENT)
Dept: PHYSICAL THERAPY | Facility: CLINIC | Age: 73
Setting detail: THERAPIES SERIES
Discharge: HOME | End: 2018-10-10
Payer: COMMERCIAL

## 2018-10-10 ENCOUNTER — TELEPHONE (OUTPATIENT)
Dept: GASTROENTEROLOGY | Age: 73
End: 2018-10-10

## 2018-10-10 PROCEDURE — 97110 THERAPEUTIC EXERCISES: CPT

## 2018-10-10 ASSESSMENT — ENCOUNTER SYMPTOMS
VOMITING: 0
BACK PAIN: 0
CONSTIPATION: 0
NAUSEA: 0
COUGH: 0
SHORTNESS OF BREATH: 0
DIARRHEA: 0

## 2018-10-10 NOTE — FLOWSHEET NOTE
HS curls 20x 2# each leg Increased resistance 10/10   Balance feet close 3x30\" foam Added foam this date 8/2   3 way hip 15x Blue     Tandem standing balance 2x20\"  bilat          in // bars      marching 2Laps     Heel to toe gait 2L     Retro walking 1 lap 1L      Side steps 2Laps each way  With gait belt around patient, facing patient holding belt, with patient's hands on clinician's forearms   Wide JACOB  2L     Ambulating around gym 3L     Increased laps 10/10         Step ups/downs x20 6\"  lateral    AlterG walk 15'  Not today   Other: alteG 1.0-1.5 speed. 45%-60% body weight    8-  TUG - 31 seconds, using walker  He does report a slight vertigo sit to stand feeling of \"flipping\"  /66 after exercise. LEFI = increased by 2.5 % overall from evaluation (had hospitalization during the 10 visits)   Patient starting to make improvements back to baseline before hospitalization and period of decreased activity. 123/68 BP 9/4/18    Specific Instructions for next treatment: Seated posture with scapular retraction. Patient has concern over foot drop on side not currently wearing AFO, evaluate further. Treatment Charges: Mins Units   []  Modalities     [x]  Ther Exercise 43 3   []  Manual Therapy     []  Ther Activities     []  Aquatics     []  Vasocompression     [x]  Other: gait     Total Treatment time 43 3   Estimated Medicare Amount as of 10/10/18 = $1366.52      Assessment: [x] Progressing toward goals. Increased weights with standing exercises and progressed patient with ambulation by adding more laps without a rest break. Patient did well with all exercises and had spo2% and HR within normal limits. Patient fatigues quickly with stair exercises.       [] Other:            G-CODE (set at eval on visit #1) (reset at visit #10)      Functional Limitation: mobility  Functional Assessment Used: LEFI ( 80% overall limitations, 82% at eval)), TUG ( 31 sec today, 34 prior)  Current Status Modifier: Ck  Goal Status Modifier: Ci  Discharge Status Modifier:     STG: (to be met in 5 treatments)  1. ? Strength: demonstrate 30 minutes of active exercise with no more than 3 breaks to increase endurance to activity. MET 7/30  2. ? Function: demonstrate sitting upright in chair with scapular retraction. 3.     4. Independent with Home Exercise Programs  Demonstrate Knowledge of fall prevention 7-16-18 met, issued and educated on fall prevention.     LTG: (to be met in 10 treatments)  1. Improve LEFI score, by 1 level for a (usual housework) - unmet, d (walking between rooms) - me tand J (in and out of car - unmet, same)  2. Decrease pain from 4/10 to 2/10 maximum, 8/30/18  3. progressing, 3/10 in left knee  4. Up and down 5 steps safely, 1 rail to simulate getting in and out of home. ONGOING  5. Improve TUG score by 6 seconds, partially met, improved by 3 seconds, 8/30/18    ADDITIONAL LTG\"s continue to address unmet goals above, focus PT on LE balance, endurance, ADL functioning and gait stability    Pt. Education:  [x] Yes  [] No  [x] Reviewed Prior HEP/Ed  Method of Education: [x] Verbal  [] Demo  [] Written:   Comprehension of Education:  [x] Verbalizes understanding. [x] Demonstrates understanding. [] Needs review. [x] Demonstrates/verbalizes HEP/Ed previously given. Plan: [x] Continue per plan of care.    [] Other:      Time In: 10:05 am           Time Out:  10:58 am    Electronically signed by:  Jesus Sofia PTA

## 2018-10-11 ENCOUNTER — CARE COORDINATION (OUTPATIENT)
Dept: CASE MANAGEMENT | Age: 73
End: 2018-10-11

## 2018-10-11 ENCOUNTER — OFFICE VISIT (OUTPATIENT)
Dept: BARIATRICS/WEIGHT MGMT | Age: 73
End: 2018-10-11
Payer: COMMERCIAL

## 2018-10-11 VITALS
DIASTOLIC BLOOD PRESSURE: 60 MMHG | HEART RATE: 62 BPM | RESPIRATION RATE: 20 BRPM | SYSTOLIC BLOOD PRESSURE: 122 MMHG | HEIGHT: 68 IN | BODY MASS INDEX: 35.01 KG/M2 | WEIGHT: 231 LBS

## 2018-10-11 DIAGNOSIS — Z98.84 STATUS POST BARIATRIC SURGERY: ICD-10-CM

## 2018-10-11 DIAGNOSIS — K21.9 GASTROESOPHAGEAL REFLUX DISEASE WITHOUT ESOPHAGITIS: Primary | ICD-10-CM

## 2018-10-11 PROCEDURE — 1036F TOBACCO NON-USER: CPT | Performed by: SURGERY

## 2018-10-11 PROCEDURE — 4040F PNEUMOC VAC/ADMIN/RCVD: CPT | Performed by: SURGERY

## 2018-10-11 PROCEDURE — G8417 CALC BMI ABV UP PARAM F/U: HCPCS | Performed by: SURGERY

## 2018-10-11 PROCEDURE — G8427 DOCREV CUR MEDS BY ELIG CLIN: HCPCS | Performed by: SURGERY

## 2018-10-11 PROCEDURE — 3017F COLORECTAL CA SCREEN DOC REV: CPT | Performed by: SURGERY

## 2018-10-11 PROCEDURE — 1123F ACP DISCUSS/DSCN MKR DOCD: CPT | Performed by: SURGERY

## 2018-10-11 PROCEDURE — 1101F PT FALLS ASSESS-DOCD LE1/YR: CPT | Performed by: SURGERY

## 2018-10-11 PROCEDURE — 99213 OFFICE O/P EST LOW 20 MIN: CPT | Performed by: SURGERY

## 2018-10-11 PROCEDURE — G8484 FLU IMMUNIZE NO ADMIN: HCPCS | Performed by: SURGERY

## 2018-10-11 PROCEDURE — 1111F DSCHRG MED/CURRENT MED MERGE: CPT | Performed by: SURGERY

## 2018-10-12 NOTE — CARE COORDINATION
[facility] Transitions Follow Up Call    10/12/2018    Patient: Ricky Mcdonough  Patient : 1945   MRN: [id number]  Reason for Admission: Gastric anastomotic stricture  Discharge Date: 10/4/18 RARS: Readmission Risk Score: 20       Spoke with: Darell Mcallister to patient for care transitions. Patient reports doing well. Patient reports plan to start easing off of Carafate. Patient denies additional changes/questions related to medications. Patient reports additional plans to initiate transitioning to a soft food diet. Patient requesting additional information on process for soft food diet and what to eat when it is time. Patient agreeable to dietary referral. Patient reports concern with telephonic dietary consult due to cataracts and difficulty with writing. Writer provided encouragement to patient and listening presence. Referral placed to Erin Hernandez as agreed with patient. -NR    Care Transitions Subsequent and Final Call    Subsequent and Final Calls  Do you have any ongoing symptoms?:  No  Have your medications changed?:  Yes  Patient Reports:  Reports physician is easing him off of the Carafate. Do you have any questions related to your medications?:  No  Do you currently have any active services?:  No  Do you have any needs or concerns that I can assist you with?:  Yes  Patient-reported Needs or Concerns:  Reports plans to begin trying to transition to soft food diet. Requesting assist in what/how to proceed. Agreeable to dietary referral.   Care Transitions Interventions     Other Services:  (Comment: Call to PCP, attempt to arrange appointment within parameters for medical transport. )      Transportation Support:  Completed   Other Interventions:             Follow Up  Future Appointments  Date Time Provider Jeri Flynn   10/15/2018 9:00 AM Bishop Whitten, PT [facility]   10/18/2018 11:00 AM Eran Infante PTA [facility]   10/22/2018

## 2018-10-15 ENCOUNTER — HOSPITAL ENCOUNTER (OUTPATIENT)
Dept: PHYSICAL THERAPY | Facility: CLINIC | Age: 73
Setting detail: THERAPIES SERIES
Discharge: HOME | End: 2018-10-15
Payer: COMMERCIAL

## 2018-10-15 PROCEDURE — 97110 THERAPEUTIC EXERCISES: CPT

## 2018-10-15 PROCEDURE — G8979 MOBILITY GOAL STATUS: HCPCS

## 2018-10-15 PROCEDURE — G8978 MOBILITY CURRENT STATUS: HCPCS

## 2018-10-15 NOTE — PROGRESS NOTES
5 treatments)  1. ? Strength: demonstrate 30 minutes of active exercise with no more than 3 breaks to increase endurance to activity. MET 7/30  2. ? Function: demonstrate sitting upright in chair with scapular retraction. 3.     4. Independent with Home Exercise Programs  Demonstrate Knowledge of fall prevention 7-16-18 met, issued and educated on fall prevention.     LTG: (to be met in 10 treatments)  1. Improve LEFI score, by 1 level for a (usual housework) - met 10/15t, d (walking between rooms) - unmetand J (in and out of car - met, 10/15)  2. Decrease pain from 4/10 to 2/10 maximum, 10/15 partially met, 3/10 for knees, all else < 2/10  3.    4. Up and down 5 steps safely, 1 rail to simulate getting in and out of home. ONGOING  5. Improve TUG score by 6 seconds, met 10-15-18     ADDITIONAL LTG\"s continue to address unmet goals above, focus PT on LE balance, endurance, ADL functioning and gait stability     Ability to walk 500 feet with walker  Up and down 7 steps, 1 rail to simulate getting in and out of home    Treatment to Date:  [x] Therapeutic Exercise    [] Modalities:  [] Therapeutic Activity    [] Ultrasound  [] Electrical Stimulation  [x] Gait Training     [] Massage       [] Lumbar/Cervical Traction  [] Neuromuscular Re-education [x] Cold/hotpack [] Iontophoresis: 4 mg/mL  [] Instruction in Home Exercise Program                     Dexamethasone Sodium  [] Manual Therapy             Phosphate 40-80 mAmin  [] Aquatic Therapy                   [] Vasocompression/   [] Other:  [] Dry Needling                                           Game Ready        Patient Status:        [x] Additional visits necessary. EVALUATE AND ISSUE AFO FOR RIGHT LE - to see Orthotist       Requested Frequency/Duration: 2 times per week for 10 treatments. Electronically signed by Apolonia Guerrero PT on 10/15/2018 at 10:10 AM      If you have any questions or concerns, please don't hesitate to call.   Thank you for your

## 2018-10-16 ENCOUNTER — CARE COORDINATION (OUTPATIENT)
Dept: CARE COORDINATION | Age: 73
End: 2018-10-16

## 2018-10-16 NOTE — CARE COORDINATION
Attempted to reach patient, had a voice message asking for a return call to [phone]. Plan: Will await return call, if I don't hear back will attempt to reach again this week.

## 2018-10-17 ENCOUNTER — CARE COORDINATION (OUTPATIENT)
Dept: CASE MANAGEMENT | Age: 73
End: 2018-10-17

## 2018-10-18 ENCOUNTER — HOSPITAL ENCOUNTER (OUTPATIENT)
Dept: PHYSICAL THERAPY | Facility: CLINIC | Age: 73
Setting detail: THERAPIES SERIES
Discharge: HOME | End: 2018-10-18
Payer: COMMERCIAL

## 2018-10-18 ENCOUNTER — CARE COORDINATION (OUTPATIENT)
Dept: CARE COORDINATION | Age: 73
End: 2018-10-18

## 2018-10-18 PROCEDURE — 97110 THERAPEUTIC EXERCISES: CPT

## 2018-10-18 NOTE — CARE COORDINATION
Registered Dietitian Initial Assessment for Care Coordination      Name-Levi Pena  October 18, 2018    Initial Referral Reason: Diet advancement from liquids to soft foods    Patient Care Team:  Jarred Gamboa PA-C as PCP - General (Physician Assistant)  Jarred aGmboa PA-C as PCP - [facility] Attributed Provider  Jorge Blunt MD as Consulting Physician (Gastroenterology)  Missy Atkins, [address] [place] as Care Transition  Dariela Jang RN as Care Transition  Gabe Crowder RN as Care Transition  Erica Moreno RD, LD as Dietitian    Patient Active Problem List   Diagnosis    Atrial fibrillation ([address]    Hyperlipidemia    GERD (gastroesophageal reflux disease)    Osteoarthritis of lumbar spine    OA (osteoarthritis) of knee, bilateral    Foot drop, right    Impaired hearing    Chronic rhinosinusitis    Special screening for malignant neoplasm of prostate    Lymphedema of both lower extremities    Hallux valgus, acquired, bilateral    Gait disorder    S/P revision of total knee    Fracture of femur ([address]    Hypertension    Slow transit constipation    Internal hemorrhoids    Calculus of gallbladder without cholecystitis without obstruction    MARIAMA on CPAP    Chronic diastolic heart failure (HCC)    Moderate obesity    Simple chronic bronchitis ([address]    Status post gastroplasty    Acute superficial gastritis without hemorrhage    Acute diastolic congestive heart failure (HCC)    Gastric outlet obstruction    Gastric anastomotic stricture       Current Outpatient Prescriptions   Medication Sig Dispense Refill    albuterol (PROVENTIL) 2 MG tablet Use as needed 1 tablet 0    potassium chloride (KLOR-CON M20) 20 MEQ extended release tablet Take 1 tablet by mouth daily and follow with your family doctor 1 tablet 0    pantoprazole (PROTONIX) 40 MG tablet TAKE 1 TABLET BY MOUTH DAILY EVERY MORNING BEFORE BREAKFAST 28 tablet 1    Multiple Measurements:  HT: 5'7\"  Weight: 231  IBW: 148 + or - 10%  BMI: 35    Biochemical Data, Medical Tests and Procedures:    No results found for: LABA1C  No results found for: EAG    Lab Results   Component Value Date    CHOL 157 07/06/2018    CHOL 157 07/06/2018    CHOL 159 04/10/2017     Lab Results   Component Value Date    TRIG 101 07/06/2018    TRIG 102 07/06/2018    TRIG 117 04/10/2017     Lab Results   Component Value Date    HDL 52 07/06/2018    HDL 53 07/06/2018    HDL 54 04/10/2017     Lab Results   Component Value Date    LDLCALC 67 10/30/2013    LDLCHOLESTEROL 85 07/06/2018    LDLCHOLESTEROL 84 07/06/2018    LDLCHOLESTEROL 82 04/10/2017     Lab Results   Component Value Date    VLDL NOT REPORTED 07/06/2018    VLDL NOT REPORTED 07/06/2018    VLDL NOT REPORTED 04/10/2017     Lab Results   Component Value Date    CHOLHDLRATIO 3.0 07/06/2018    CHOLHDLRATIO 3.0 07/06/2018    CHOLHDLRATIO 2.9 04/10/2017       Lab Results   Component Value Date    WBC 4.4 10/04/2018    HGB 10.2 (L) 10/04/2018    HCT 32.2 (L) 10/04/2018    MCV 97.3 10/04/2018     10/04/2018       Lab Results   Component Value Date    CREATININE 0.64 (L) 10/04/2018    BUN 7 (L) 10/04/2018     10/04/2018    K 4.3 10/04/2018    CL 99 10/04/2018    CO2 31 10/04/2018         NUTRITION DIAGNOSIS    #1 Problem  Food and nutrition-related knowledge deficit       Etiology  related to diet advacement       Signs/Symptoms  as evidenced by unsure how to proceed from liquid to soft diet    NUTRITION INTERVENTION  Nutrition Prescription: based on adjusted weight of 180#    soft diet providing 2050 kcals/day  Protein needs: 80gms/d  Fluid needs: 2500cc/day      Patient Goals:  1. Patient is starting to eat soft foods, encouraged him to introduce one new food at a time so he can identify foods that he can't tolerate. 2. Advised him to try foods food- minced, chopped or mashed.  Encouraged scrambled eggs, applesauce, mashed potatoes, yogurt, soups,

## 2018-10-18 NOTE — CARE COORDINATION
Final attempt to reach patient for care transitions. LM requesting return call. Contact information provided. Episode resolved at this time.

## 2018-10-18 NOTE — CARE COORDINATION
Attempted to reach patient, had a voice message asking for a return call to [phone]. Plan: Will await return call, if I don't hear back will attempt to reach again within the next week.

## 2018-10-18 NOTE — CARE COORDINATION
Encounter opened for additional care transitions outreach attempt. Patient noted to be in physical therapy at the time of attempt. Will attempt to reach patient at later time/date.

## 2018-10-22 ENCOUNTER — HOSPITAL ENCOUNTER (OUTPATIENT)
Dept: PHYSICAL THERAPY | Facility: CLINIC | Age: 73
Setting detail: THERAPIES SERIES
Discharge: HOME | End: 2018-10-22
Payer: COMMERCIAL

## 2018-10-22 ENCOUNTER — OFFICE VISIT (OUTPATIENT)
Dept: FAMILY MEDICINE CLINIC | Age: 73
End: 2018-10-22
Payer: COMMERCIAL

## 2018-10-22 VITALS
HEIGHT: 68 IN | DIASTOLIC BLOOD PRESSURE: 61 MMHG | TEMPERATURE: 96.9 F | SYSTOLIC BLOOD PRESSURE: 104 MMHG | WEIGHT: 227.2 LBS | HEART RATE: 78 BPM | BODY MASS INDEX: 34.43 KG/M2 | OXYGEN SATURATION: 98 % | RESPIRATION RATE: 20 BRPM

## 2018-10-22 DIAGNOSIS — H93.8X3 SENSATION OF FULLNESS IN BOTH EARS: Primary | ICD-10-CM

## 2018-10-22 DIAGNOSIS — I50.32 CHRONIC DIASTOLIC HEART FAILURE (HCC): ICD-10-CM

## 2018-10-22 PROCEDURE — 4040F PNEUMOC VAC/ADMIN/RCVD: CPT | Performed by: PHYSICIAN ASSISTANT

## 2018-10-22 PROCEDURE — 1123F ACP DISCUSS/DSCN MKR DOCD: CPT | Performed by: PHYSICIAN ASSISTANT

## 2018-10-22 PROCEDURE — 1111F DSCHRG MED/CURRENT MED MERGE: CPT | Performed by: PHYSICIAN ASSISTANT

## 2018-10-22 PROCEDURE — G8484 FLU IMMUNIZE NO ADMIN: HCPCS | Performed by: PHYSICIAN ASSISTANT

## 2018-10-22 PROCEDURE — 97110 THERAPEUTIC EXERCISES: CPT

## 2018-10-22 PROCEDURE — G8417 CALC BMI ABV UP PARAM F/U: HCPCS | Performed by: PHYSICIAN ASSISTANT

## 2018-10-22 PROCEDURE — 3017F COLORECTAL CA SCREEN DOC REV: CPT | Performed by: PHYSICIAN ASSISTANT

## 2018-10-22 PROCEDURE — 1101F PT FALLS ASSESS-DOCD LE1/YR: CPT | Performed by: PHYSICIAN ASSISTANT

## 2018-10-22 PROCEDURE — 99213 OFFICE O/P EST LOW 20 MIN: CPT | Performed by: PHYSICIAN ASSISTANT

## 2018-10-22 PROCEDURE — G8427 DOCREV CUR MEDS BY ELIG CLIN: HCPCS | Performed by: PHYSICIAN ASSISTANT

## 2018-10-22 PROCEDURE — 1036F TOBACCO NON-USER: CPT | Performed by: PHYSICIAN ASSISTANT

## 2018-10-22 RX ORDER — GUAIFENESIN 600 MG/1
600 TABLET, EXTENDED RELEASE ORAL 2 TIMES DAILY
Qty: 60 TABLET | Refills: 0 | Status: SHIPPED | OUTPATIENT
Start: 2018-10-22 | End: 2019-03-05 | Stop reason: SDUPTHER

## 2018-10-22 ASSESSMENT — ENCOUNTER SYMPTOMS: SORE THROAT: 1

## 2018-10-22 NOTE — TELEPHONE ENCOUNTER
Patient called and could not get a appt today but could not make it tomorrow because of transportation and has bad ear pain about 2 weeks in both ears, ear lobe is red

## 2018-10-22 NOTE — PROGRESS NOTES
Visit Information    Have you changed or started any medications since your last visit including any over-the-counter medicines, vitamins, or herbal medicines? no   Have you stopped taking any of your medications? Is so, why? -  no  Are you having any side effects from any of your medications? - no    Have you seen any other physician or provider since your last visit?  no   Have you had any other diagnostic tests since your last visit?  no   Have you been seen in the emergency room and/or had an admission in a hospital since we last saw you?  no   Have you had your routine dental cleaning in the past 6 months?  no     Do you have an active MyChart account? If no, what is the barrier?   Yes    Patient Care Team:  Stanford Osorio PA-C as PCP - General (Physician Assistant)  Stanford Osorio PA-C as PCP - [facility] Attributed Provider  Sherren Bare, MD as Consulting Physician (Gastroenterology)    Bri[address], JOSH, LD as Dietitian    Medical History Review  Past Medical, Family, and Social History reviewed and does not contribute to the patient presenting condition    Health Maintenance   Topic Date Due    Shingles Vaccine (1 of 2 - 2 Dose Series) 04/03/2019 (Originally 10/31/1996)    DTaP/Tdap/Td vaccine (1 - Tdap) 04/10/2019 (Originally 10/31/1965)    Flu vaccine (1) 09/09/2019 (Originally 9/1/2018)    Pneumococcal low/med risk (2 of 2 - PPSV23) 08/17/2019    Potassium monitoring  10/04/2019    Creatinine monitoring  10/04/2019    Colon cancer screen colonoscopy  03/26/2023    Lipid screen  07/06/2023    AAA screen  Completed    Hepatitis C screen  Completed
PHYSICAL EXAM:  Vitals:    10/22/18 1551   BP: 104/61   Site: Left Upper Arm   Position: Sitting   Cuff Size: Medium Adult   Pulse: 78   Resp: 20   Temp: 96.9 °F (36.1 °C)   TempSrc: Oral   SpO2: 98%   Weight: 227 lb 3.2 oz (103.1 kg)   Height: 5' 7.5\" (1.715 m)     BP Readings from Last 3 Encounters:   10/22/18 104/61   10/11/18 122/60   10/08/18 105/68        Physical Exam   Constitutional: He is oriented to person, place, and time. HENT:   Head: Normocephalic and atraumatic. Right Ear: Tympanic membrane, external ear and ear canal normal. No middle ear effusion. Left Ear: External ear and ear canal normal. A middle ear effusion is present. Eyes: Pupils are equal, round, and reactive to light. Cardiovascular: Normal rate, regular rhythm and normal heart sounds. Pulmonary/Chest: Effort normal and breath sounds normal.   Abdominal: Soft. He exhibits no mass. There is no tenderness. Musculoskeletal: He exhibits no tenderness. Neurological: He is alert and oriented to person, place, and time. Skin: Skin is warm and dry. Vitals reviewed. DIAGNOSTIC FINDINGS:  CBC:  Lab Results   Component Value Date    WBC 4.4 10/04/2018    HGB 10.2 10/04/2018     10/04/2018     09/13/2011       BMP:    Lab Results   Component Value Date     10/04/2018    K 4.3 10/04/2018    CL 99 10/04/2018    CO2 31 10/04/2018    BUN 7 10/04/2018    CREATININE 0.64 10/04/2018    GLUCOSE 96 10/04/2018    GLUCOSE 99 09/13/2011       HEMOGLOBIN A1C: No results found for: LABA1C    FASTING LIPID [email or web address]    ASSESSMENT AND PLAN:  Isis Norton was seen today for otalgia. Diagnoses and all orders for this visit:    Sensation of fullness in both ears  -     guaiFENesin (MUCINEX) 600 MG extended release tablet;  Take 1 tablet by mouth 2 times daily      FOLLOW UP AND INSTRUCTIONS:  Return in about 7 weeks (around 12/10/2018) for HTN, GERD, Obesity, GI

## 2018-10-22 NOTE — FLOWSHEET NOTE
sitting upright in chair with scapular retraction. 3.     4. Independent with Home Exercise Programs  Demonstrate Knowledge of fall prevention 7-16-18 met, issued and educated on fall prevention.     LTG: (to be met in 10 treatments)  1. Improve LEFI score, by 1 level for a (usual housework) - met 10/15t, d (walking between rooms) - unmetand J (in and out of car - met, 10/15)  2. Decrease pain from 4/10 to 2/10 maximum, 10/15 partially met, 3/10 for knees, all else < 2/10  3.   4. Up and down 5 steps safely, 1 rail to simulate getting in and out of home. ONGOING  5. Improve TUG score by 6 seconds, met 10-15-18    ADDITIONAL LTG\"s continue to address unmet goals above, focus PT on LE balance, endurance, ADL functioning and gait stability    Ability to walk 500 feet with walker  Up and down 7 steps, 1 rail to simulate getting in and out of home    Pt. Education:  [x] Yes  [] No  [x] Reviewed Prior HEP/Ed  Method of Education: [x] Verbal  [x] Demo  [] Written  10-15-18 added sitting scapular retraction with green band for home:   Comprehension of Education:  [x] Verbalizes understanding. [x] Demonstrates understanding. [] Needs review. [x] Demonstrates/verbalizes HEP/Ed previously given. Plan: [x] Continue per plan of care.    [x] Other:Progress note sent to request continuation      Time In: 10:00 am           Time Out:  10:50 am    Electronically signed by:  Eliseo Hill PTA

## 2018-10-23 ENCOUNTER — TELEPHONE (OUTPATIENT)
Dept: FAMILY MEDICINE CLINIC | Age: 73
End: 2018-10-23

## 2018-10-23 RX ORDER — METOPROLOL SUCCINATE 50 MG/1
TABLET, EXTENDED RELEASE ORAL
Qty: 28 TABLET | Refills: 2 | Status: ON HOLD | OUTPATIENT
Start: 2018-10-23 | End: 2018-11-20 | Stop reason: HOSPADM

## 2018-10-23 RX ORDER — GABAPENTIN 300 MG/1
CAPSULE ORAL
Qty: 270 CAPSULE | Refills: 0 | Status: SHIPPED | OUTPATIENT
Start: 2018-10-23 | End: 2019-04-03 | Stop reason: SDUPTHER

## 2018-10-23 RX ORDER — SPIRONOLACTONE 25 MG/1
TABLET ORAL
Qty: 28 TABLET | Refills: 2 | Status: ON HOLD | OUTPATIENT
Start: 2018-10-23 | End: 2018-11-20 | Stop reason: HOSPADM

## 2018-10-25 ENCOUNTER — HOSPITAL ENCOUNTER (OUTPATIENT)
Dept: PHYSICAL THERAPY | Facility: CLINIC | Age: 73
Setting detail: THERAPIES SERIES
Discharge: HOME | End: 2018-10-25
Payer: COMMERCIAL

## 2018-10-25 PROCEDURE — 97110 THERAPEUTIC EXERCISES: CPT

## 2018-10-25 ASSESSMENT — ENCOUNTER SYMPTOMS
VOMITING: 0
NAUSEA: 0
DIARRHEA: 0
CONSTIPATION: 0
SHORTNESS OF BREATH: 0
BACK PAIN: 0
COUGH: 0

## 2018-10-29 ENCOUNTER — HOSPITAL ENCOUNTER (OUTPATIENT)
Dept: PHYSICAL THERAPY | Facility: CLINIC | Age: 73
Setting detail: THERAPIES SERIES
Discharge: HOME | End: 2018-10-29
Payer: COMMERCIAL

## 2018-10-29 PROCEDURE — 97110 THERAPEUTIC EXERCISES: CPT

## 2018-10-29 NOTE — FLOWSHEET NOTE
[] Alesia Beck       Outpatient Physical        Therapy       [address]       Phone: [phone]       Fax: [phone] [x] [facility] at [address]       Phone: [phone]       Fax: [phone] [] Isaíasjason. [facility]  [address] [place] [address]   Phone: [phone]   Fax:  [phone]     Physical Therapy Daily Treatment Note    Date:  10/29/2018  Patient: Jermaine Earl                                                      : 1945                    MRN: [id number]  Physician: Lalita Ambrosio PA-C                                   Insurance: Medicare, Medicaid  Medical Diagnosis: gait disorder                                          Rehab Codes: R26.2, R62.81, M62.591, M62.592, R26.89  Onset date: 2018                            Next 's appt.: 2018  Visit# / total visits:  ( 10 additional visits approved 10/15/18)       Cancels/No Shows: 3/0    Subjective: pt with same complaints as last visit, bilateral knee pain.    Pain:  [x] Yes  [] No Location: see ahead Pain Rating: (0-10 scale) 3/10, bilateral knee, arch in left foot is sore  Pain altered Tx:  [x] No  [] Yes  Action:    Comments:      Todays Treatment:  Modalities:   Precautions:COPD  Exercises: bolded completed  Exercise Reps/ Time Weight/ Level Comments   SciFit - seat at 11 6 min L5-4.2 Prefers 4.2 as time progressed   UBE 6 min L5    sitting scapular retraction  10x  green  added 10/15   HEP            // bars        Heel raises  15x       Mini squats 15x     marching 15x 1# each leg Increased resistance    HS curls 20x 2# each leg Increased resistance 10/10   Balance feet close 3x30\" foam Added foam this date    3 way hip 20x Blue  10/15 increased to 20x   Tandem standing balance 2x20\"  bilat    Sit to stands 2x15           in // bars      marching 2Laps     Heel to toe gait 2L     Retro walking 1 lap

## 2018-10-31 ENCOUNTER — OFFICE VISIT (OUTPATIENT)
Dept: GASTROENTEROLOGY | Age: 73
End: 2018-10-31
Payer: COMMERCIAL

## 2018-10-31 VITALS
WEIGHT: 228.5 LBS | BODY MASS INDEX: 35.26 KG/M2 | DIASTOLIC BLOOD PRESSURE: 61 MMHG | HEART RATE: 60 BPM | SYSTOLIC BLOOD PRESSURE: 100 MMHG

## 2018-10-31 DIAGNOSIS — Z98.890 HISTORY OF ESOPHAGOGASTRODUODENOSCOPY (EGD): ICD-10-CM

## 2018-10-31 DIAGNOSIS — K21.9 GASTROESOPHAGEAL REFLUX DISEASE WITHOUT ESOPHAGITIS: Primary | ICD-10-CM

## 2018-10-31 PROCEDURE — G8427 DOCREV CUR MEDS BY ELIG CLIN: HCPCS | Performed by: INTERNAL MEDICINE

## 2018-10-31 PROCEDURE — 1101F PT FALLS ASSESS-DOCD LE1/YR: CPT | Performed by: INTERNAL MEDICINE

## 2018-10-31 PROCEDURE — 4040F PNEUMOC VAC/ADMIN/RCVD: CPT | Performed by: INTERNAL MEDICINE

## 2018-10-31 PROCEDURE — G8484 FLU IMMUNIZE NO ADMIN: HCPCS | Performed by: INTERNAL MEDICINE

## 2018-10-31 PROCEDURE — G8417 CALC BMI ABV UP PARAM F/U: HCPCS | Performed by: INTERNAL MEDICINE

## 2018-10-31 PROCEDURE — 99213 OFFICE O/P EST LOW 20 MIN: CPT | Performed by: INTERNAL MEDICINE

## 2018-10-31 PROCEDURE — 1036F TOBACCO NON-USER: CPT | Performed by: INTERNAL MEDICINE

## 2018-10-31 PROCEDURE — 1123F ACP DISCUSS/DSCN MKR DOCD: CPT | Performed by: INTERNAL MEDICINE

## 2018-10-31 PROCEDURE — 3017F COLORECTAL CA SCREEN DOC REV: CPT | Performed by: INTERNAL MEDICINE

## 2018-10-31 PROCEDURE — 1111F DSCHRG MED/CURRENT MED MERGE: CPT | Performed by: INTERNAL MEDICINE

## 2018-10-31 RX ORDER — FLUTICASONE PROPIONATE 50 MCG
1 SPRAY, SUSPENSION (ML) NASAL DAILY
COMMUNITY
End: 2019-03-22 | Stop reason: SDUPTHER

## 2018-10-31 ASSESSMENT — ENCOUNTER SYMPTOMS
BACK PAIN: 1
CONSTIPATION: 0
SORE THROAT: 1
CHEST TIGHTNESS: 0
ANAL BLEEDING: 0
SINUS PRESSURE: 1
SINUS PAIN: 1
ABDOMINAL DISTENTION: 0
SHORTNESS OF BREATH: 0
BLOOD IN STOOL: 0
RECTAL PAIN: 0
ABDOMINAL PAIN: 0
VOMITING: 0
NAUSEA: 0
COUGH: 1
DIARRHEA: 0
TROUBLE SWALLOWING: 0

## 2018-11-01 ENCOUNTER — HOSPITAL ENCOUNTER (OUTPATIENT)
Dept: PHYSICAL THERAPY | Facility: CLINIC | Age: 73
Setting detail: THERAPIES SERIES
Discharge: HOME | End: 2018-11-01
Payer: COMMERCIAL

## 2018-11-01 PROCEDURE — 97110 THERAPEUTIC EXERCISES: CPT

## 2018-11-06 ENCOUNTER — HOSPITAL ENCOUNTER (OUTPATIENT)
Dept: PHYSICAL THERAPY | Facility: CLINIC | Age: 73
Setting detail: THERAPIES SERIES
Discharge: HOME | End: 2018-11-06
Payer: COMMERCIAL

## 2018-11-06 PROCEDURE — 97110 THERAPEUTIC EXERCISES: CPT

## 2018-11-06 NOTE — FLOWSHEET NOTE
[] Imelda Anaya       Outpatient Physical        Therapy       [address]       Phone: [phone]       Fax: [phone] [x] [facility] at [address] [facility]       Phone: [phone]       Fax: [phone] [] Reji. [facility]  [address] [place] [address]   Phone: [phone]   Fax:  [phone]     Physical Therapy Daily Treatment Note    Date:  2018  Patient: Michelle Cedillo                                                      : 1945                    MRN: [id number]  Physician: Mayelin Samuels PA-C                                   Insurance: Medicare, Medicaid  Medical Diagnosis: gait disorder                                          Rehab Codes: R26.2, R62.81, M62.591, M62.592, R26.89  Onset date: 2018                            Next 's appt.: 2018  Visit# / total visits:  ( 10 additional visits approved 10/15/18)       Cancels/No Shows: 3/0    Subjective:   Pain:  [x] Yes  [] No Location: see ahead Pain Rating: (0-10 scale) 3/10 shoulders, knees, ankles   Pain altered Tx:  [x] No  [] Yes  Action:    Comments:  Pt states same complaints today.       Todays Treatment:  Modalities:   Precautions:COPD  Exercises: bolded completed  Exercise Reps/ Time Weight/ Level Comments   SciFit - seat at 9 10' L4 Prefers 4.2 as time progressed   UBE 6 min L5    sitting scapular retraction  10x  green  added 10/15   HEP            // bars        Heel raises  15x       Mini squats 15x     marching 15x 2# each leg Increased resistance    HS curls 20x 2# each leg Increased resistance 10/10   Balance feet close 3x30\" foam Added foam this date    4 way hip 20x Blue  1015 increased to 20x   Tandem standing balance 2x20\"  bilat    NBOS eyes closed 2x20\"  No UE touchdowns    Sit to stands 2x15                 in // bars      marching 3Laps     Heel to toe gait 2L     Retro walking 1 lap

## 2018-11-07 ENCOUNTER — OFFICE VISIT (OUTPATIENT)
Dept: BARIATRICS/WEIGHT MGMT | Age: 73
End: 2018-11-07
Payer: COMMERCIAL

## 2018-11-07 ENCOUNTER — TELEPHONE (OUTPATIENT)
Dept: FAMILY MEDICINE CLINIC | Age: 73
End: 2018-11-07

## 2018-11-07 VITALS
DIASTOLIC BLOOD PRESSURE: 60 MMHG | RESPIRATION RATE: 20 BRPM | WEIGHT: 235 LBS | BODY MASS INDEX: 35.61 KG/M2 | SYSTOLIC BLOOD PRESSURE: 122 MMHG | HEART RATE: 68 BPM | HEIGHT: 68 IN

## 2018-11-07 DIAGNOSIS — M47.816 SPONDYLOSIS OF LUMBAR REGION WITHOUT MYELOPATHY OR RADICULOPATHY: Primary | ICD-10-CM

## 2018-11-07 DIAGNOSIS — Z98.84 STATUS POST BARIATRIC SURGERY: ICD-10-CM

## 2018-11-07 DIAGNOSIS — K21.9 GASTROESOPHAGEAL REFLUX DISEASE WITHOUT ESOPHAGITIS: Primary | ICD-10-CM

## 2018-11-07 PROCEDURE — 1123F ACP DISCUSS/DSCN MKR DOCD: CPT | Performed by: SURGERY

## 2018-11-07 PROCEDURE — 3017F COLORECTAL CA SCREEN DOC REV: CPT | Performed by: SURGERY

## 2018-11-07 PROCEDURE — 1101F PT FALLS ASSESS-DOCD LE1/YR: CPT | Performed by: SURGERY

## 2018-11-07 PROCEDURE — G8417 CALC BMI ABV UP PARAM F/U: HCPCS | Performed by: SURGERY

## 2018-11-07 PROCEDURE — 1036F TOBACCO NON-USER: CPT | Performed by: SURGERY

## 2018-11-07 PROCEDURE — 4040F PNEUMOC VAC/ADMIN/RCVD: CPT | Performed by: SURGERY

## 2018-11-07 PROCEDURE — G8427 DOCREV CUR MEDS BY ELIG CLIN: HCPCS | Performed by: SURGERY

## 2018-11-07 PROCEDURE — 99213 OFFICE O/P EST LOW 20 MIN: CPT | Performed by: SURGERY

## 2018-11-07 PROCEDURE — G8484 FLU IMMUNIZE NO ADMIN: HCPCS | Performed by: SURGERY

## 2018-11-08 ENCOUNTER — APPOINTMENT (OUTPATIENT)
Dept: PHYSICAL THERAPY | Facility: CLINIC | Age: 73
End: 2018-11-08
Payer: COMMERCIAL

## 2018-11-09 ENCOUNTER — CARE COORDINATION (OUTPATIENT)
Dept: CARE COORDINATION | Age: 73
End: 2018-11-09

## 2018-11-12 RX ORDER — CYCLOBENZAPRINE HCL 10 MG
TABLET ORAL
Qty: 30 TABLET | Refills: 2 | Status: SHIPPED | OUTPATIENT
Start: 2018-11-12 | End: 2018-11-16

## 2018-11-13 ENCOUNTER — HOSPITAL ENCOUNTER (OUTPATIENT)
Dept: PHYSICAL THERAPY | Facility: CLINIC | Age: 73
Setting detail: THERAPIES SERIES
End: 2018-11-13
Payer: COMMERCIAL

## 2018-11-15 ENCOUNTER — HOSPITAL ENCOUNTER (OUTPATIENT)
Dept: PHYSICAL THERAPY | Facility: CLINIC | Age: 73
Setting detail: THERAPIES SERIES
Discharge: HOME | End: 2018-11-15
Payer: COMMERCIAL

## 2018-11-15 PROCEDURE — 97110 THERAPEUTIC EXERCISES: CPT

## 2018-11-15 NOTE — FLOWSHEET NOTE
to toe gait 2L     Retro walking 1 lap 1L      Side steps 2Laps each way     Wide JACOB  2L     Ambulating without AD ~20ft  With belt CGA   Ambulating around gym with cane 1L   CGA, SOB at end of walking         Step ups x20 6\"     Step ups/ down x20 4\" Lateral    Tramp toss x15 4# Gait belt, CGA   AlterG walk 15'  Not today   Other: alteG 1.0-1.5 speed. 45%-60% body weight       Specific Instructions for next treatment:         Treatment Charges: Mins Units   []  Modalities     [x]  Ther Exercise 30 2   []  Manual Therapy     []  Ther Activities     []  Aquatics     []  Vasocompression     [x]  Other: gait     Total Treatment time 30 2   Estimated Medicare Amount as of 11/15/18 = $1903.03(KX modifier)    Assessment: [x] Progressing toward goals. Pt did well with all exercises today. Needed several breaks in between exercises due to fatigue. He was able to complete a lap around the track with the cane, but became tired towards the end.      [] Other:        G-CODE (set at eval on visit #1) (reset at visit #10) (reset at visit #20)     Functional Limitation: mobility  Functional Assessment Used: LEFI ( 67% overall limitations, 82% at eval)), TUG ( 25 sec today, 31 prior)  Current Status Modifier: Ck  Goal Status Modifier: Ci  Discharge Status Modifier:     STG: (to be met in 5 treatments)  1. ? Strength: demonstrate 30 minutes of active exercise with no more than 3 breaks to increase endurance to activity. MET 7/30  2. ? Function: demonstrate sitting upright in chair with scapular retraction. 3.     4. Independent with Home Exercise Programs  Demonstrate Knowledge of fall prevention 7-16-18 met, issued and educated on fall prevention.     LTG: (to be met in 10 treatments)  1. Improve LEFI score, by 1 level for a (usual housework) - met 10/15t, d (walking between rooms) - unmetand J (in and out of car - met, 10/15)  2.  Decrease pain from 4/10 to 2/10 maximum, 10/15 partially met, 3/10 for knees, all else < 2/10  3.   4. Up and down 5 steps safely, 1 rail to simulate getting in and out of home. ONGOING  5. Improve TUG score by 6 seconds, met 10-15-18    ADDITIONAL LTG\"s continue to address unmet goals above, focus PT on LE balance, endurance, ADL functioning and gait stability    Ability to walk 500 feet with walker  Up and down 7 steps, 1 rail to simulate getting in and out of home    Pt. Education:  [] Yes  [x] No  [] Reviewed Prior HEP/Ed  Method of Education: [] Verbal  [] Demo  [] Written  Comprehension of Education:  [] Verbalizes understanding. [] Demonstrates understanding. [] Needs review. [] Demonstrates/verbalizes HEP/Ed previously given. Plan: [x] Continue per plan of care.    [] Other:      Time In: 8:50am            Time Out:   9:30am    Electronically signed by:  Justa Calhoun PTA

## 2018-11-16 ENCOUNTER — TELEPHONE (OUTPATIENT)
Dept: BARIATRICS/WEIGHT MGMT | Age: 73
End: 2018-11-16

## 2018-11-16 ENCOUNTER — TELEPHONE (OUTPATIENT)
Dept: PRIMARY CARE CLINIC | Age: 73
End: 2018-11-16

## 2018-11-16 ENCOUNTER — HOSPITAL ENCOUNTER (INPATIENT)
Age: 73
LOS: 4 days | Discharge: HOME | DRG: 394 | End: 2018-11-20
Attending: EMERGENCY MEDICINE | Admitting: INTERNAL MEDICINE
Payer: COMMERCIAL

## 2018-11-16 DIAGNOSIS — R11.2 NON-INTRACTABLE VOMITING WITH NAUSEA, UNSPECIFIED VOMITING TYPE: Primary | ICD-10-CM

## 2018-11-16 DIAGNOSIS — R30.0 DYSURIA: ICD-10-CM

## 2018-11-16 DIAGNOSIS — M79.10 MYALGIA: ICD-10-CM

## 2018-11-16 DIAGNOSIS — R39.9 UTI SYMPTOMS: Primary | ICD-10-CM

## 2018-11-16 DIAGNOSIS — M47.816 SPONDYLOSIS OF LUMBAR REGION WITHOUT MYELOPATHY OR RADICULOPATHY: Primary | ICD-10-CM

## 2018-11-16 DIAGNOSIS — I50.32 CHRONIC DIASTOLIC HEART FAILURE (HCC): Primary | ICD-10-CM

## 2018-11-16 LAB
-: ABNORMAL
ABSOLUTE EOS #: 0.03 K/UL (ref 0–0.44)
ABSOLUTE IMMATURE GRANULOCYTE: <0.03 K/UL (ref 0–0.3)
ABSOLUTE LYMPH #: 0.87 K/UL (ref 1.1–3.7)
ABSOLUTE MONO #: 0.51 K/UL (ref 0.1–1.2)
ALBUMIN SERPL-MCNC: 3.6 G/DL (ref 3.5–5.2)
ALBUMIN/GLOBULIN RATIO: 1 (ref 1–2.5)
ALP BLD-CCNC: 109 U/L (ref 40–129)
ALT SERPL-CCNC: 8 U/L (ref 5–41)
AMORPHOUS: ABNORMAL
ANION GAP SERPL CALCULATED.3IONS-SCNC: 14 MMOL/L (ref 9–17)
AST SERPL-CCNC: 18 U/L
BACTERIA: ABNORMAL
BASOPHILS # BLD: 1 % (ref 0–2)
BASOPHILS ABSOLUTE: 0.03 K/UL (ref 0–0.2)
BILIRUB SERPL-MCNC: 0.83 MG/DL (ref 0.3–1.2)
BILIRUBIN DIRECT: 0.42 MG/DL
BILIRUBIN URINE: NEGATIVE
BILIRUBIN, INDIRECT: 0.41 MG/DL (ref 0–1)
BUN BLDV-MCNC: 19 MG/DL (ref 8–23)
BUN/CREAT BLD: ABNORMAL (ref 9–20)
CALCIUM SERPL-MCNC: 9.2 MG/DL (ref 8.6–10.4)
CASTS UA: ABNORMAL /LPF (ref 0–2)
CHLORIDE BLD-SCNC: 102 MMOL/L (ref 98–107)
CO2: 26 MMOL/L (ref 20–31)
COLOR: ABNORMAL
COMMENT UA: ABNORMAL
CREAT SERPL-MCNC: 0.7 MG/DL (ref 0.7–1.2)
CRYSTALS, UA: ABNORMAL /HPF
DIFFERENTIAL TYPE: ABNORMAL
EOSINOPHILS RELATIVE PERCENT: 1 % (ref 1–4)
EPITHELIAL CELLS UA: ABNORMAL /HPF (ref 0–5)
GFR AFRICAN AMERICAN: >60 ML/MIN
GFR NON-AFRICAN AMERICAN: >60 ML/MIN
GFR SERPL CREATININE-BSD FRML MDRD: ABNORMAL ML/MIN/{1.73_M2}
GFR SERPL CREATININE-BSD FRML MDRD: ABNORMAL ML/MIN/{1.73_M2}
GLOBULIN: ABNORMAL G/DL (ref 1.5–3.8)
GLUCOSE BLD-MCNC: 116 MG/DL (ref 70–99)
GLUCOSE URINE: NEGATIVE
HCT VFR BLD CALC: 30.8 % (ref 40.7–50.3)
HCT VFR BLD CALC: 35.8 % (ref 40.7–50.3)
HEMOGLOBIN: 11.1 G/DL (ref 13–17)
HEMOGLOBIN: 9.9 G/DL (ref 13–17)
IMMATURE GRANULOCYTES: 0 %
KETONES, URINE: ABNORMAL
LACTIC ACID, WHOLE BLOOD: 1.4 MMOL/L (ref 0.7–2.1)
LEUKOCYTE ESTERASE, URINE: ABNORMAL
LIPASE: 8 U/L (ref 13–60)
LYMPHOCYTES # BLD: 14 % (ref 24–43)
MCH RBC QN AUTO: 29.4 PG (ref 25.2–33.5)
MCH RBC QN AUTO: 30.4 PG (ref 25.2–33.5)
MCHC RBC AUTO-ENTMCNC: 31 G/DL (ref 28.4–34.8)
MCHC RBC AUTO-ENTMCNC: 32.1 G/DL (ref 28.4–34.8)
MCV RBC AUTO: 94.5 FL (ref 82.6–102.9)
MCV RBC AUTO: 95 FL (ref 82.6–102.9)
MONOCYTES # BLD: 8 % (ref 3–12)
MUCUS: ABNORMAL
NITRITE, URINE: NEGATIVE
NRBC AUTOMATED: 0 PER 100 WBC
NRBC AUTOMATED: 0 PER 100 WBC
OTHER OBSERVATIONS UA: ABNORMAL
PARTIAL THROMBOPLASTIN TIME: 29 SEC (ref 20.5–30.5)
PDW BLD-RTO: 15.2 % (ref 11.8–14.4)
PDW BLD-RTO: 15.2 % (ref 11.8–14.4)
PH UA: 5 (ref 5–8)
PLATELET # BLD: 245 K/UL (ref 138–453)
PLATELET # BLD: 261 K/UL (ref 138–453)
PLATELET ESTIMATE: ABNORMAL
PMV BLD AUTO: 8.9 FL (ref 8.1–13.5)
PMV BLD AUTO: 9.5 FL (ref 8.1–13.5)
POTASSIUM SERPL-SCNC: 4.4 MMOL/L (ref 3.7–5.3)
PROSTATE SPECIFIC ANTIGEN: 0.3 UG/L
PROTEIN UA: NEGATIVE
RBC # BLD: 3.26 M/UL (ref 4.21–5.77)
RBC # BLD: 3.77 M/UL (ref 4.21–5.77)
RBC # BLD: ABNORMAL 10*6/UL
RBC UA: ABNORMAL /HPF (ref 0–2)
RENAL EPITHELIAL, UA: ABNORMAL /HPF
SEG NEUTROPHILS: 76 % (ref 36–65)
SEGMENTED NEUTROPHILS ABSOLUTE COUNT: 4.91 K/UL (ref 1.5–8.1)
SODIUM BLD-SCNC: 142 MMOL/L (ref 135–144)
SPECIFIC GRAVITY UA: 1.02 (ref 1–1.03)
TOTAL PROTEIN: 7.1 G/DL (ref 6.4–8.3)
TRICHOMONAS: ABNORMAL
TURBIDITY: CLEAR
URINE HGB: ABNORMAL
UROBILINOGEN, URINE: NORMAL
WBC # BLD: 5.6 K/UL (ref 3.5–11.3)
WBC # BLD: 6.4 K/UL (ref 3.5–11.3)
WBC # BLD: ABNORMAL 10*3/UL
WBC UA: ABNORMAL /HPF (ref 0–5)
YEAST: ABNORMAL

## 2018-11-16 PROCEDURE — 85027 COMPLETE CBC AUTOMATED: CPT

## 2018-11-16 PROCEDURE — 83690 ASSAY OF LIPASE: CPT

## 2018-11-16 PROCEDURE — 80076 HEPATIC FUNCTION PANEL: CPT

## 2018-11-16 PROCEDURE — 87086 URINE CULTURE/COLONY COUNT: CPT

## 2018-11-16 PROCEDURE — 96374 THER/PROPH/DIAG INJ IV PUSH: CPT

## 2018-11-16 PROCEDURE — 85025 COMPLETE CBC W/AUTO DIFF WBC: CPT

## 2018-11-16 PROCEDURE — 2580000003 HC RX 258: Performed by: EMERGENCY MEDICINE

## 2018-11-16 PROCEDURE — 2500000003 HC RX 250 WO HCPCS: Performed by: EMERGENCY MEDICINE

## 2018-11-16 PROCEDURE — 85730 THROMBOPLASTIN TIME PARTIAL: CPT

## 2018-11-16 PROCEDURE — 96372 THER/PROPH/DIAG INJ SC/IM: CPT

## 2018-11-16 PROCEDURE — 2580000003 HC RX 258: Performed by: STUDENT IN AN ORGANIZED HEALTH CARE EDUCATION/TRAINING PROGRAM

## 2018-11-16 PROCEDURE — 99223 1ST HOSP IP/OBS HIGH 75: CPT | Performed by: INTERNAL MEDICINE

## 2018-11-16 PROCEDURE — 36415 COLL VENOUS BLD VENIPUNCTURE: CPT

## 2018-11-16 PROCEDURE — 6360000002 HC RX W HCPCS: Performed by: EMERGENCY MEDICINE

## 2018-11-16 PROCEDURE — 87186 SC STD MICRODIL/AGAR DIL: CPT

## 2018-11-16 PROCEDURE — 96375 TX/PRO/DX INJ NEW DRUG ADDON: CPT

## 2018-11-16 PROCEDURE — 6360000002 HC RX W HCPCS: Performed by: STUDENT IN AN ORGANIZED HEALTH CARE EDUCATION/TRAINING PROGRAM

## 2018-11-16 PROCEDURE — 99284 EMERGENCY DEPT VISIT MOD MDM: CPT

## 2018-11-16 PROCEDURE — G0103 PSA SCREENING: HCPCS

## 2018-11-16 PROCEDURE — 80048 BASIC METABOLIC PNL TOTAL CA: CPT

## 2018-11-16 PROCEDURE — S0028 INJECTION, FAMOTIDINE, 20 MG: HCPCS | Performed by: EMERGENCY MEDICINE

## 2018-11-16 PROCEDURE — 1200000000 HC SEMI PRIVATE

## 2018-11-16 PROCEDURE — 81001 URINALYSIS AUTO W/SCOPE: CPT

## 2018-11-16 PROCEDURE — 96361 HYDRATE IV INFUSION ADD-ON: CPT

## 2018-11-16 PROCEDURE — 83605 ASSAY OF LACTIC ACID: CPT

## 2018-11-16 PROCEDURE — 87077 CULTURE AEROBIC IDENTIFY: CPT

## 2018-11-16 RX ORDER — SODIUM CHLORIDE 0.9 % (FLUSH) 0.9 %
10 SYRINGE (ML) INJECTION EVERY 12 HOURS SCHEDULED
Status: DISCONTINUED | OUTPATIENT
Start: 2018-11-16 | End: 2018-11-20 | Stop reason: HOSPADM

## 2018-11-16 RX ORDER — PANTOPRAZOLE SODIUM 40 MG/10ML
40 INJECTION, POWDER, LYOPHILIZED, FOR SOLUTION INTRAVENOUS DAILY
Status: DISCONTINUED | OUTPATIENT
Start: 2018-11-17 | End: 2018-11-18

## 2018-11-16 RX ORDER — IPRATROPIUM BROMIDE AND ALBUTEROL SULFATE 2.5; .5 MG/3ML; MG/3ML
1 SOLUTION RESPIRATORY (INHALATION) EVERY 4 HOURS
Status: DISCONTINUED | OUTPATIENT
Start: 2018-11-16 | End: 2018-11-16

## 2018-11-16 RX ORDER — HEPARIN SODIUM 1000 [USP'U]/ML
4000 INJECTION, SOLUTION INTRAVENOUS; SUBCUTANEOUS ONCE
Status: COMPLETED | OUTPATIENT
Start: 2018-11-16 | End: 2018-11-16

## 2018-11-16 RX ORDER — SULFAMETHOXAZOLE AND TRIMETHOPRIM 800; 160 MG/1; MG/1
1 TABLET ORAL 2 TIMES DAILY
Status: CANCELLED | OUTPATIENT
Start: 2018-11-17 | End: 2018-11-22

## 2018-11-16 RX ORDER — TIZANIDINE 4 MG/1
4 TABLET ORAL NIGHTLY PRN
Status: CANCELLED | OUTPATIENT
Start: 2018-11-16

## 2018-11-16 RX ORDER — METOPROLOL TARTRATE 5 MG/5ML
5 INJECTION INTRAVENOUS EVERY 6 HOURS
Status: DISCONTINUED | OUTPATIENT
Start: 2018-11-16 | End: 2018-11-16

## 2018-11-16 RX ORDER — MULTIVITAMIN WITH FOLIC ACID 400 MCG
1 TABLET ORAL DAILY
Status: CANCELLED | OUTPATIENT
Start: 2018-11-16

## 2018-11-16 RX ORDER — ALBUTEROL SULFATE 90 UG/1
2 AEROSOL, METERED RESPIRATORY (INHALATION) EVERY 6 HOURS PRN
Status: DISCONTINUED | OUTPATIENT
Start: 2018-11-16 | End: 2018-11-16

## 2018-11-16 RX ORDER — SODIUM CHLORIDE 0.9 % (FLUSH) 0.9 %
10 SYRINGE (ML) INJECTION PRN
Status: DISCONTINUED | OUTPATIENT
Start: 2018-11-16 | End: 2018-11-16

## 2018-11-16 RX ORDER — SPIRONOLACTONE 25 MG/1
1 TABLET ORAL DAILY
Status: CANCELLED | OUTPATIENT
Start: 2018-11-16

## 2018-11-16 RX ORDER — SODIUM CHLORIDE 0.9 % (FLUSH) 0.9 %
10 SYRINGE (ML) INJECTION PRN
Status: CANCELLED | OUTPATIENT
Start: 2018-11-16

## 2018-11-16 RX ORDER — BUMETANIDE 1 MG/1
1 TABLET ORAL 2 TIMES DAILY
Status: CANCELLED | OUTPATIENT
Start: 2018-11-16

## 2018-11-16 RX ORDER — SULFAMETHOXAZOLE AND TRIMETHOPRIM 800; 160 MG/1; MG/1
1 TABLET ORAL 2 TIMES DAILY
Qty: 10 TABLET | Refills: 0 | Status: ON HOLD | OUTPATIENT
Start: 2018-11-16 | End: 2018-11-20 | Stop reason: HOSPADM

## 2018-11-16 RX ORDER — HEPARIN SODIUM 10000 [USP'U]/100ML
1000 INJECTION, SOLUTION INTRAVENOUS CONTINUOUS
Status: DISCONTINUED | OUTPATIENT
Start: 2018-11-16 | End: 2018-11-19

## 2018-11-16 RX ORDER — SODIUM CHLORIDE 9 MG/ML
INJECTION, SOLUTION INTRAVENOUS CONTINUOUS
Status: CANCELLED | OUTPATIENT
Start: 2018-11-16

## 2018-11-16 RX ORDER — MULTIVITAMIN WITH FOLIC ACID 400 MCG
1 TABLET ORAL DAILY
Status: DISCONTINUED | OUTPATIENT
Start: 2018-11-17 | End: 2018-11-20 | Stop reason: HOSPADM

## 2018-11-16 RX ORDER — GABAPENTIN 300 MG/1
300 CAPSULE ORAL 3 TIMES DAILY
Status: CANCELLED | OUTPATIENT
Start: 2018-11-16

## 2018-11-16 RX ORDER — HEPARIN SODIUM 1000 [USP'U]/ML
4000 INJECTION, SOLUTION INTRAVENOUS; SUBCUTANEOUS PRN
Status: DISCONTINUED | OUTPATIENT
Start: 2018-11-16 | End: 2018-11-20 | Stop reason: HOSPADM

## 2018-11-16 RX ORDER — SUCRALFATE 1 G/1
1 TABLET ORAL 4 TIMES DAILY
Status: CANCELLED | OUTPATIENT
Start: 2018-11-16

## 2018-11-16 RX ORDER — POTASSIUM CHLORIDE 20 MEQ/1
TABLET, EXTENDED RELEASE ORAL
Qty: 168 TABLET | Refills: 0 | Status: SHIPPED | OUTPATIENT
Start: 2018-11-16 | End: 2018-11-16

## 2018-11-16 RX ORDER — SODIUM CHLORIDE 0.9 % (FLUSH) 0.9 %
10 SYRINGE (ML) INJECTION EVERY 12 HOURS SCHEDULED
Status: DISCONTINUED | OUTPATIENT
Start: 2018-11-16 | End: 2018-11-16

## 2018-11-16 RX ORDER — HEPARIN SODIUM 1000 [USP'U]/ML
2000 INJECTION, SOLUTION INTRAVENOUS; SUBCUTANEOUS PRN
Status: DISCONTINUED | OUTPATIENT
Start: 2018-11-16 | End: 2018-11-20 | Stop reason: HOSPADM

## 2018-11-16 RX ORDER — ARMODAFINIL 200 MG/1
1 TABLET ORAL EVERY MORNING
Status: CANCELLED | OUTPATIENT
Start: 2018-11-17

## 2018-11-16 RX ORDER — METOCLOPRAMIDE HYDROCHLORIDE 5 MG/5ML
5 SOLUTION ORAL
Status: DISCONTINUED | OUTPATIENT
Start: 2018-11-16 | End: 2018-11-16

## 2018-11-16 RX ORDER — METOCLOPRAMIDE HYDROCHLORIDE 5 MG/5ML
5 SOLUTION ORAL
Status: CANCELLED | OUTPATIENT
Start: 2018-11-16

## 2018-11-16 RX ORDER — TIZANIDINE 4 MG/1
4 TABLET ORAL NIGHTLY PRN
Qty: 30 TABLET | Refills: 2 | Status: SHIPPED | OUTPATIENT
Start: 2018-11-16 | End: 2019-02-06 | Stop reason: SDUPTHER

## 2018-11-16 RX ORDER — DICYCLOMINE HYDROCHLORIDE 10 MG/ML
20 INJECTION INTRAMUSCULAR ONCE
Status: COMPLETED | OUTPATIENT
Start: 2018-11-16 | End: 2018-11-16

## 2018-11-16 RX ORDER — ALBUTEROL SULFATE 90 UG/1
2 AEROSOL, METERED RESPIRATORY (INHALATION) EVERY 6 HOURS PRN
Status: CANCELLED | OUTPATIENT
Start: 2018-11-16

## 2018-11-16 RX ORDER — FLUTICASONE PROPIONATE 50 MCG
1 SPRAY, SUSPENSION (ML) NASAL DAILY
Status: CANCELLED | OUTPATIENT
Start: 2018-11-16

## 2018-11-16 RX ORDER — FLUTICASONE PROPIONATE 50 MCG
1 SPRAY, SUSPENSION (ML) NASAL DAILY
Status: DISCONTINUED | OUTPATIENT
Start: 2018-11-17 | End: 2018-11-20 | Stop reason: HOSPADM

## 2018-11-16 RX ORDER — ALBUTEROL SULFATE 2.5 MG/3ML
2.5 SOLUTION RESPIRATORY (INHALATION)
Status: DISCONTINUED | OUTPATIENT
Start: 2018-11-16 | End: 2018-11-17

## 2018-11-16 RX ORDER — GUAIFENESIN 600 MG/1
600 TABLET, EXTENDED RELEASE ORAL 2 TIMES DAILY
Status: CANCELLED | OUTPATIENT
Start: 2018-11-16

## 2018-11-16 RX ORDER — ONDANSETRON 2 MG/ML
4 INJECTION INTRAMUSCULAR; INTRAVENOUS ONCE
Status: COMPLETED | OUTPATIENT
Start: 2018-11-16 | End: 2018-11-16

## 2018-11-16 RX ORDER — SODIUM CHLORIDE 9 MG/ML
INJECTION, SOLUTION INTRAVENOUS CONTINUOUS
Status: DISCONTINUED | OUTPATIENT
Start: 2018-11-16 | End: 2018-11-20

## 2018-11-16 RX ORDER — ONDANSETRON 2 MG/ML
4 INJECTION INTRAMUSCULAR; INTRAVENOUS EVERY 6 HOURS PRN
Status: CANCELLED | OUTPATIENT
Start: 2018-11-16

## 2018-11-16 RX ORDER — METOPROLOL SUCCINATE 50 MG/1
1 TABLET, EXTENDED RELEASE ORAL DAILY
Status: CANCELLED | OUTPATIENT
Start: 2018-11-16

## 2018-11-16 RX ORDER — SODIUM CHLORIDE 0.9 % (FLUSH) 0.9 %
10 SYRINGE (ML) INJECTION PRN
Status: DISCONTINUED | OUTPATIENT
Start: 2018-11-16 | End: 2018-11-20 | Stop reason: HOSPADM

## 2018-11-16 RX ORDER — 0.9 % SODIUM CHLORIDE 0.9 %
1000 INTRAVENOUS SOLUTION INTRAVENOUS ONCE
Status: COMPLETED | OUTPATIENT
Start: 2018-11-16 | End: 2018-11-16

## 2018-11-16 RX ORDER — METOPROLOL TARTRATE 5 MG/5ML
2.5 INJECTION INTRAVENOUS EVERY 4 HOURS PRN
Status: DISCONTINUED | OUTPATIENT
Start: 2018-11-16 | End: 2018-11-20 | Stop reason: HOSPADM

## 2018-11-16 RX ORDER — ALBUTEROL SULFATE 2.5 MG/3ML
2.5 SOLUTION RESPIRATORY (INHALATION) EVERY 4 HOURS PRN
Status: DISCONTINUED | OUTPATIENT
Start: 2018-11-16 | End: 2018-11-20 | Stop reason: HOSPADM

## 2018-11-16 RX ORDER — SODIUM CHLORIDE 0.9 % (FLUSH) 0.9 %
10 SYRINGE (ML) INJECTION EVERY 12 HOURS SCHEDULED
Status: CANCELLED | OUTPATIENT
Start: 2018-11-16

## 2018-11-16 RX ORDER — 0.9 % SODIUM CHLORIDE 0.9 %
10 VIAL (ML) INJECTION DAILY
Status: DISCONTINUED | OUTPATIENT
Start: 2018-11-17 | End: 2018-11-18

## 2018-11-16 RX ORDER — ALBUTEROL SULFATE 90 UG/1
2 AEROSOL, METERED RESPIRATORY (INHALATION) EVERY 4 HOURS PRN
Status: DISCONTINUED | OUTPATIENT
Start: 2018-11-16 | End: 2018-11-20 | Stop reason: HOSPADM

## 2018-11-16 RX ORDER — ONDANSETRON 2 MG/ML
4 INJECTION INTRAMUSCULAR; INTRAVENOUS EVERY 6 HOURS PRN
Status: DISCONTINUED | OUTPATIENT
Start: 2018-11-16 | End: 2018-11-20 | Stop reason: HOSPADM

## 2018-11-16 RX ORDER — IPRATROPIUM BROMIDE AND ALBUTEROL SULFATE 2.5; .5 MG/3ML; MG/3ML
1 SOLUTION RESPIRATORY (INHALATION) EVERY 4 HOURS
Status: CANCELLED | OUTPATIENT
Start: 2018-11-16

## 2018-11-16 RX ADMIN — FAMOTIDINE 20 MG: 10 INJECTION, SOLUTION INTRAVENOUS at 15:18

## 2018-11-16 RX ADMIN — Medication 10 ML: at 21:45

## 2018-11-16 RX ADMIN — DICYCLOMINE HYDROCHLORIDE 20 MG: 20 INJECTION, SOLUTION INTRAMUSCULAR at 15:18

## 2018-11-16 RX ADMIN — HEPARIN SODIUM 4000 UNITS: 1000 INJECTION, SOLUTION INTRAVENOUS; SUBCUTANEOUS at 23:10

## 2018-11-16 RX ADMIN — HEPARIN SODIUM AND DEXTROSE 1000 UNITS/HR: 10000; 5 INJECTION INTRAVENOUS at 23:09

## 2018-11-16 RX ADMIN — SODIUM CHLORIDE 1000 ML: 9 INJECTION, SOLUTION INTRAVENOUS at 15:18

## 2018-11-16 RX ADMIN — ONDANSETRON 4 MG: 2 INJECTION INTRAMUSCULAR; INTRAVENOUS at 15:18

## 2018-11-16 ASSESSMENT — PAIN DESCRIPTION - LOCATION
LOCATION: ABDOMEN
LOCATION: ABDOMEN

## 2018-11-16 ASSESSMENT — PAIN DESCRIPTION - FREQUENCY: FREQUENCY: INTERMITTENT

## 2018-11-16 ASSESSMENT — ENCOUNTER SYMPTOMS
SHORTNESS OF BREATH: 0
VOMITING: 1
COUGH: 0
NAUSEA: 1
COLOR CHANGE: 0
EYE PAIN: 0
SORE THROAT: 0
RHINORRHEA: 0
ABDOMINAL PAIN: 1

## 2018-11-16 ASSESSMENT — PAIN DESCRIPTION - DESCRIPTORS
DESCRIPTORS: ACHING
DESCRIPTORS: ACHING

## 2018-11-16 ASSESSMENT — PAIN DESCRIPTION - ORIENTATION
ORIENTATION: MID
ORIENTATION: MID

## 2018-11-16 ASSESSMENT — PAIN SCALES - GENERAL
PAINLEVEL_OUTOF10: 4
PAINLEVEL_OUTOF10: 3

## 2018-11-16 ASSESSMENT — PAIN DESCRIPTION - ONSET: ONSET: ON-GOING

## 2018-11-16 ASSESSMENT — PAIN DESCRIPTION - PROGRESSION: CLINICAL_PROGRESSION: GRADUALLY IMPROVING

## 2018-11-16 ASSESSMENT — PAIN SCALES - WONG BAKER: WONGBAKER_NUMERICALRESPONSE: 4

## 2018-11-16 NOTE — ED PROVIDER NOTES
[facility] ED  Emergency Department Encounter  EmergencyMedicine Resident     Pt Name:Levi Barbosa  MRN: [id number]  [place] 1945  Date of evaluation: 11/16/18  PCP:  Randi Gross, [address] [place] [address]       Chief Complaint   Patient presents with    Emesis     vomiting and dehydration for the past 5 days    Dehydration       HISTORY OF PRESENT ILLNESS  (Location/Symptom, Timing/Onset, Context/Setting, Quality, Duration, Modifying Factors, Severity.)      Michelle Garcia is a 68 y.o. male who presents with chief complaint of nausea, vomiting, abdominal pain and dysuria. States that the symptoms started on Sunday. States that he's been having nausea, and multiple bouts of emesis since then. States that he is not tolerating oral intake. States that he's had at least 40 episodes of emesis since Sunday. States that more recently, his vomit has been more brown in color. Also complaining of some epigastric belly pain it's been ongoing for the past 2 days. States that the pain is constant, nonradiating, 4 out of 10 in severity. Denies any alleviating or exacerbating factors. Denies any recent alcohol consumption. Denies any changes in bowel habits. States that he's been having some burning with urination, and darkening of urine color. PAST MEDICAL / SURGICAL / SOCIAL / FAMILY HISTORY      has a past medical history of Asthma; Atrial fibrillation ([address] GERD (gastroesophageal reflux disease); Hyperlipidemia; Hypertension; Obesity; Osteoarthritis; and Unspecified sleep apnea. has a past surgical history that includes Finger amputation (1966); Gastric bypass surgery (1985); Carpal tunnel release; Colonoscopy; Rotator cuff repair; joint replacement (2004 & 2005); Hemorrhoid surgery; pr egd transoral biopsy single/multiple (N/A, 5/25/2018); Upper gastrointestinal endoscopy (08/13/2018); Upper gastrointestinal endoscopy (N/A, 8/13/2018);  Upper gastrointestinal endoscopy (N/A, 8/13/2018); Upper gastrointestinal endoscopy (08/16/2018); pr egd flexible foreign body removal (N/A, 8/16/2018); Upper gastrointestinal endoscopy (8/16/2018); Upper gastrointestinal endoscopy (N/A, 10/1/2018); and Upper gastrointestinal endoscopy (10/1/2018). Social History     Social History    Marital status:      Spouse name: N/A    Number of children: N/A    Years of education: N/A     Occupational History    Not on file. Social History Main Topics    Smoking status: Former Smoker     Packs/day: 1.00     Years: 20.00     Quit date: 12/14/1976    Smokeless tobacco: Never Used    Alcohol use No    Drug use: No    Sexual activity: Not on file     Other Topics Concern    Not on file     Social History Narrative    No narrative on file       Family History   Problem Relation Age of Onset    Cancer Mother     Heart Attack Father        Allergies:  Darvocet [propoxyphene n-acetaminophen]; Other; and Oxycodone-acetaminophen    Home Medications:  Prior to Admission medications    Medication Sig Start Date End Date Taking? Authorizing Provider   sulfamethoxazole-trimethoprim (BACTRIM DS) 800-160 MG per tablet Take 1 tablet by mouth 2 times daily for 5 days 11/16/18 11/21/18  Carolina Hong PA-C   tiZANidine (ZANAFLEX) 4 MG tablet Take 1 tablet by mouth nightly as needed (Myalgia) 11/16/18   Carolina Hong PA-C   pantoprazole (PROTONIX) 40 MG tablet TAKE 1 TABLET BY MOUTH DAILY EVERY MORNING BEFORE BREAKFAST 11/12/18   Carolina Hong PA-C   fluticasone (FLONASE) 50 MCG/ACT nasal spray 1 spray by Each Nare route daily    Historical Provider, MD   spironolactone (ALDACTONE) 25 MG tablet TAKE 1 TABLET BY MOUTH DAILY 10/23/18   Carolina Hong PA-C   metoprolol succinate (TOPROL XL) 50 MG extended release tablet TAKE 1 TABLET BY MOUTH DAILY 10/23/18   Carolina Hong PA-C   gabapentin (NEURONTIN) 300 MG capsule TAKE ONE CAPSULE BY MOUTH 3 TIMES A DAY.  10/23/18 4/20/19 4, 10 or more for level 5)      Review of Systems   Constitutional: Negative for chills and fever. HENT: Negative for rhinorrhea and sore throat. Eyes: Negative for pain and visual disturbance. Respiratory: Negative for cough and shortness of breath. Cardiovascular: Negative for chest pain and palpitations. Gastrointestinal: Positive for abdominal pain, nausea and vomiting. Genitourinary: Positive for dysuria. Negative for difficulty urinating. Musculoskeletal: Negative for arthralgias and myalgias. Skin: Negative for color change and wound. Neurological: Negative for weakness, numbness and headaches. Psychiatric/Behavioral: Negative for behavioral problems and dysphoric mood. PHYSICAL EXAM   (up to 7 for level 4, 8 or more for level 5)      INITIAL VITALS:   /66   Pulse 95   Temp 98.5 °F (36.9 °C) (Oral)   Resp 18   SpO2 98%     Physical Exam   Constitutional: He is oriented to person, place, and time. He appears well-developed and well-nourished. No distress. HENT:   Head: Normocephalic and atraumatic. Oral mucosa is dry in appearance   Eyes: Pupils are equal, round, and reactive to light. EOM are normal.   Neck: Normal range of motion. Cardiovascular: Normal rate and regular rhythm. Pulmonary/Chest: Effort normal. He has no wheezes. He has no rales. Abdominal: Soft. There is no tenderness. There is no rebound and no guarding. Soft, non-tender, no guarding or rigidity felt; non-peritoneal   Musculoskeletal: Normal range of motion. Neurological: He is alert and oriented to person, place, and time. He has normal strength. GCS eye subscore is 4. GCS verbal subscore is 5. GCS motor subscore is 6. Skin: Skin is warm and dry.    Psychiatric: His behavior is normal.       DIFFERENTIAL  DIAGNOSIS     PLAN (LABS / IMAGING / EKG):  Orders Placed This Encounter   Procedures    CBC Auto Differential    Basic Metabolic Panel    Lipase    Hepatic Function Panel    IMPRESSION      1. Non-intractable vomiting with nausea, unspecified vomiting type    2.  Dysuria          DISPOSITION / PLAN     DISPOSITION        PATIENT REF[facility]:  [facility] [place] [id number]  [facility], [address]  [address]:  New Prescriptions    No medications on file       Geoff Mcmahon MD  Emergency Medicine Resident    (Please note that portions of thisnote were completed with a voice recognition program.  Efforts were made to edit the dictations but occasionally words are mis-transcribed.)       Geoff Mcmahon MD  Resident  11/16/18 6826

## 2018-11-16 NOTE — PLAN OF CARE
Problem: Pain:  Goal: Pain level will decrease  Pain level will decrease   Outcome: Ongoing  Pain assessed with hourly rounding and PRN. Pain meds adminstered as needed and reassessed. Pt offered diversional activities, repositioning, and comfort items. Problem: Falls - Risk of:  Goal: Will remain free from falls  Will remain free from falls   Outcome: Ongoing  Fall screening completed. Patient alert and oriented, uses call light appropriately. Needs assessed with hourly rounding. Environment scanned for safety issues. Problem: Risk for Impaired Skin Integrity  Goal: Tissue integrity - skin and mucous membranes  Structural intactness and normal physiological function of skin and  mucous membranes. Outcome: Ongoing  Sundeep score assessed each shift. Writer ensured pt repositioned if not done by self. Mattress overlay applied as needed per Sundeep score. Skin visually assessed with head to toe.  Preventative measures used as appropriate

## 2018-11-16 NOTE — H&P
past surgical history that includes Finger amputation (1966); Gastric bypass surgery (1985); Carpal tunnel release; Colonoscopy; Rotator cuff repair; joint replacement (2004 & 2005); Hemorrhoid surgery; pr egd transoral biopsy single/multiple (N/A, 5/25/2018); Upper gastrointestinal endoscopy (08/13/2018); Upper gastrointestinal endoscopy (N/A, 8/13/2018); Upper gastrointestinal endoscopy (N/A, 8/13/2018); Upper gastrointestinal endoscopy (08/16/2018); pr egd flexible foreign body removal (N/A, 8/16/2018); Upper gastrointestinal endoscopy (8/16/2018); Upper gastrointestinal endoscopy (N/A, 10/1/2018); and Upper gastrointestinal endoscopy (10/1/2018). HOME MEDICATIONS        Prior to Admission medications    Medication Sig Start Date End Date Taking? Authorizing Provider   sulfamethoxazole-trimethoprim (BACTRIM DS) 800-160 MG per tablet Take 1 tablet by mouth 2 times daily for 5 days 11/16/18 11/21/18  Lalita Ambrosio PA-C   tiZANidine (ZANAFLEX) 4 MG tablet Take 1 tablet by mouth nightly as needed (Myalgia) 11/16/18   Lalita Ambrosio PA-C   pantoprazole (PROTONIX) 40 MG tablet TAKE 1 TABLET BY MOUTH DAILY EVERY MORNING BEFORE BREAKFAST 11/12/18   Lalita Ambrosio PA-C   fluticasone (FLONASE) 50 MCG/ACT nasal spray 1 spray by Each Nare route daily    Historical Provider, MD   spironolactone (ALDACTONE) 25 MG tablet TAKE 1 TABLET BY MOUTH DAILY 10/23/18   Lalita Ambrosio PA-C   metoprolol succinate (TOPROL XL) 50 MG extended release tablet TAKE 1 TABLET BY MOUTH DAILY 10/23/18   Lalita Ambrosio PA-C   gabapentin (NEURONTIN) 300 MG capsule TAKE ONE CAPSULE BY MOUTH 3 TIMES A DAY.  10/23/18 4/20/19  Lalita Ambrosio PA-C   guaiFENesin [facility]) 600 MG extended release tablet Take 1 tablet by mouth 2 times daily 10/22/18 11/21/18  Lalita Ambrosio PA-C   albuterol (PROVENTIL) 2 MG tablet Use as needed 10/4/18   Emi Herrera MD   Multiple Vitamin (MULTIVITAMIN) tablet Take 1 tablet by mouth daily

## 2018-11-16 NOTE — TELEPHONE ENCOUNTER
Pt's daughter called stating that her father will not go to ED unless he is instructed to do so by our office. Daughter reports that pt has been vomiting since Sunday - he is unable to keep anything down - not solids or liquids - emesis is now black- brown in color. No fever. Spoke with Dr. Jarocho Holm.     Next spoke with pt and daughter via speaker phone - advised pt to go to [facility] ED - pt verbalized agreement

## 2018-11-16 NOTE — CONSULTS
General Surgery:  Consult Note        PATIENT NAME: Miko Kramer   YOB: 1945    ADMISSION DATE: 11/16/2018  2:22 PM     Admitting Provider: Dr Chico Bird Physician: Dr Debbi Rivera: 11/16/2018    Chief Complaint:  Emesis and dehydration  Consult Regarding:  Emesis    HISTORY OF PRESENT ILLNESS:  The patient is a 68 y.o. male s/p vertical gastric banding (1987) presented to the ED on 11/16/2018 complaining of multiple episodes of emesis, dehydration and epigastric pain. He has been unable to keep anything down for the 5 days. He states that the vomitus has progressively gotten darker but denies any blood. He has had similar episodes in the past and his symptoms are relieved with esophageal dilation. Dilation keeps his symptoms at bay for a while. He denies dysphagia. He states that his abdomen feels sore due to the multiple episodes of emesis. Pt had an episode of explosive diarrhea 4 days ago and has not had a BM since. He has been passing minimal flatus. He has a history of GERD that has been managed with omeprazole. Denies fevers, chills, chest pain, SOB. Pt states that he was recently diagnosed with a UTI and was supposed to start antibiotics today.      Past Medical History:        Diagnosis Date    Asthma     Atrial fibrillation ([address]     GERD (gastroesophageal reflux disease)     Hyperlipidemia     Hypertension     Obesity     Osteoarthritis     Unspecified sleep apnea        Past Surgical History:        Procedure Laterality Date    CARPAL TUNNEL RELEASE      bilateral    COLONOSCOPY      FINGER AMPUTATION  1966    first knuckle right index finger    GASTRIC BYPASS SURGERY  1985    vertical banded gastroplasty    HEMORRHOID SURGERY      JOINT REPLACEMENT  2004 & 2005    bilateral knees    [place] EGD FLEXIBLE FOREIGN BODY REMOVAL N/A 8/16/2018    EGD FOREIGN BODY REMOVAL performed by Willow Davison MD at [address] EGD TRANSORAL BIOPSY SINGLE/MULTIPLE

## 2018-11-17 LAB
ABSOLUTE EOS #: 0.16 K/UL (ref 0–0.44)
ABSOLUTE IMMATURE GRANULOCYTE: <0.03 K/UL (ref 0–0.3)
ABSOLUTE LYMPH #: 1.21 K/UL (ref 1.1–3.7)
ABSOLUTE MONO #: 0.69 K/UL (ref 0.1–1.2)
ANION GAP SERPL CALCULATED.3IONS-SCNC: 13 MMOL/L (ref 9–17)
BASOPHILS # BLD: 0 % (ref 0–2)
BASOPHILS ABSOLUTE: <0.03 K/UL (ref 0–0.2)
BUN BLDV-MCNC: 16 MG/DL (ref 8–23)
BUN/CREAT BLD: ABNORMAL (ref 9–20)
CALCIUM SERPL-MCNC: 8.5 MG/DL (ref 8.6–10.4)
CHLORIDE BLD-SCNC: 103 MMOL/L (ref 98–107)
CO2: 25 MMOL/L (ref 20–31)
CREAT SERPL-MCNC: 0.7 MG/DL (ref 0.7–1.2)
DIFFERENTIAL TYPE: ABNORMAL
EOSINOPHILS RELATIVE PERCENT: 3 % (ref 1–4)
GFR AFRICAN AMERICAN: >60 ML/MIN
GFR NON-AFRICAN AMERICAN: >60 ML/MIN
GFR SERPL CREATININE-BSD FRML MDRD: ABNORMAL ML/MIN/{1.73_M2}
GFR SERPL CREATININE-BSD FRML MDRD: ABNORMAL ML/MIN/{1.73_M2}
GLUCOSE BLD-MCNC: 98 MG/DL (ref 70–99)
HCT VFR BLD CALC: 30.4 % (ref 40.7–50.3)
HEMOGLOBIN: 9.9 G/DL (ref 13–17)
IMMATURE GRANULOCYTES: 0 %
LYMPHOCYTES # BLD: 25 % (ref 24–43)
MCH RBC QN AUTO: 29.9 PG (ref 25.2–33.5)
MCHC RBC AUTO-ENTMCNC: 32.6 G/DL (ref 28.4–34.8)
MCV RBC AUTO: 91.8 FL (ref 82.6–102.9)
MONOCYTES # BLD: 14 % (ref 3–12)
NRBC AUTOMATED: 0 PER 100 WBC
PARTIAL THROMBOPLASTIN TIME: 40.1 SEC (ref 20.5–30.5)
PARTIAL THROMBOPLASTIN TIME: 49.8 SEC (ref 20.5–30.5)
PARTIAL THROMBOPLASTIN TIME: 51.5 SEC (ref 20.5–30.5)
PARTIAL THROMBOPLASTIN TIME: >120 SEC (ref 20.5–30.5)
PDW BLD-RTO: 15.1 % (ref 11.8–14.4)
PLATELET # BLD: 197 K/UL (ref 138–453)
PLATELET ESTIMATE: ABNORMAL
PMV BLD AUTO: 8.9 FL (ref 8.1–13.5)
POTASSIUM SERPL-SCNC: 3.8 MMOL/L (ref 3.7–5.3)
RBC # BLD: 3.31 M/UL (ref 4.21–5.77)
RBC # BLD: ABNORMAL 10*6/UL
SEG NEUTROPHILS: 58 % (ref 36–65)
SEGMENTED NEUTROPHILS ABSOLUTE COUNT: 2.78 K/UL (ref 1.5–8.1)
SODIUM BLD-SCNC: 141 MMOL/L (ref 135–144)
WBC # BLD: 4.9 K/UL (ref 3.5–11.3)
WBC # BLD: ABNORMAL 10*3/UL

## 2018-11-17 PROCEDURE — 1200000000 HC SEMI PRIVATE

## 2018-11-17 PROCEDURE — 6360000002 HC RX W HCPCS: Performed by: STUDENT IN AN ORGANIZED HEALTH CARE EDUCATION/TRAINING PROGRAM

## 2018-11-17 PROCEDURE — 80048 BASIC METABOLIC PNL TOTAL CA: CPT

## 2018-11-17 PROCEDURE — 99232 SBSQ HOSP IP/OBS MODERATE 35: CPT | Performed by: INTERNAL MEDICINE

## 2018-11-17 PROCEDURE — 2580000003 HC RX 258: Performed by: STUDENT IN AN ORGANIZED HEALTH CARE EDUCATION/TRAINING PROGRAM

## 2018-11-17 PROCEDURE — 85730 THROMBOPLASTIN TIME PARTIAL: CPT

## 2018-11-17 PROCEDURE — 99222 1ST HOSP IP/OBS MODERATE 55: CPT | Performed by: SURGERY

## 2018-11-17 PROCEDURE — 36415 COLL VENOUS BLD VENIPUNCTURE: CPT

## 2018-11-17 PROCEDURE — 6370000000 HC RX 637 (ALT 250 FOR IP): Performed by: STUDENT IN AN ORGANIZED HEALTH CARE EDUCATION/TRAINING PROGRAM

## 2018-11-17 PROCEDURE — 85025 COMPLETE CBC W/AUTO DIFF WBC: CPT

## 2018-11-17 PROCEDURE — 94660 CPAP INITIATION&MGMT: CPT

## 2018-11-17 PROCEDURE — C9113 INJ PANTOPRAZOLE SODIUM, VIA: HCPCS | Performed by: STUDENT IN AN ORGANIZED HEALTH CARE EDUCATION/TRAINING PROGRAM

## 2018-11-17 RX ADMIN — FLUTICASONE PROPIONATE 1 SPRAY: 50 SPRAY, METERED NASAL at 10:10

## 2018-11-17 RX ADMIN — SODIUM CHLORIDE: 9 INJECTION, SOLUTION INTRAVENOUS at 03:02

## 2018-11-17 RX ADMIN — HEPARIN SODIUM AND DEXTROSE 12.14 UNITS/KG/HR: 10000; 5 INJECTION INTRAVENOUS at 07:18

## 2018-11-17 RX ADMIN — SODIUM CHLORIDE: 9 INJECTION, SOLUTION INTRAVENOUS at 16:23

## 2018-11-17 RX ADMIN — PANTOPRAZOLE SODIUM 40 MG: 40 INJECTION, POWDER, FOR SOLUTION INTRAVENOUS at 10:10

## 2018-11-17 RX ADMIN — CEFTRIAXONE SODIUM 1 G: 1 INJECTION, POWDER, FOR SOLUTION INTRAMUSCULAR; INTRAVENOUS at 03:02

## 2018-11-17 RX ADMIN — THERA TABS 1 TABLET: TAB at 10:10

## 2018-11-17 RX ADMIN — HEPARIN SODIUM AND DEXTROSE 1190 UNITS/HR: 10000; 5 INJECTION INTRAVENOUS at 21:48

## 2018-11-17 RX ADMIN — Medication 10 ML: at 10:11

## 2018-11-17 RX ADMIN — HEPARIN SODIUM 2000 UNITS: 1000 INJECTION, SOLUTION INTRAVENOUS; SUBCUTANEOUS at 07:19

## 2018-11-17 ASSESSMENT — PAIN DESCRIPTION - PAIN TYPE: TYPE: CHRONIC PAIN

## 2018-11-17 ASSESSMENT — PAIN SCALES - GENERAL
PAINLEVEL_OUTOF10: 3
PAINLEVEL_OUTOF10: 3

## 2018-11-17 ASSESSMENT — PAIN DESCRIPTION - DESCRIPTORS: DESCRIPTORS: ACHING

## 2018-11-17 ASSESSMENT — PAIN DESCRIPTION - ORIENTATION: ORIENTATION: RIGHT;LEFT

## 2018-11-17 ASSESSMENT — PAIN DESCRIPTION - LOCATION: LOCATION: ANKLE;KNEE

## 2018-11-17 NOTE — PLAN OF CARE
Problem: Pain:  Goal: Pain level will decrease  Pain level will decrease   Outcome: Ongoing  Pain assessed with hourly rounding and PRN. Pain meds adminstered as needed and reassessed. Pt offered diversional activities, repositioning, and comfort items. Problem: Falls - Risk of:  Goal: Will remain free from falls  Will remain free from falls   Outcome: Ongoing  Fall screening completed. Patient alert and oriented, uses call light appropriately. Needs assessed with hourly rounding. Environment scanned for safety issues. Bed alarm activated. Problem: Risk for Impaired Skin Integrity  Goal: Tissue integrity - skin and mucous membranes  Structural intactness and normal physiological function of skin and  mucous membranes. Outcome: Ongoing  Sundeep score assessed each shift. Writer ensured pt repositioned if not done by self. Mattress overlay applied as needed per Sundeep score. Skin visually assessed with head to toe.  Preventative measures used as appropriate

## 2018-11-17 NOTE — PROGRESS NOTES
Nutrition Assessment    Type and Reason for Visit: Initial, Positive Nutrition Screen    Nutrition Recommendations: Continue current diet, Start ONS (ensure clear). Advance diet as able. Will monitor tolerance to diet and intake. Nutrition Assessment: Pt reports weight loss over past 3 weeks, states his usual wt is 233 lbs. Reports appetite has been good but unable to keep food down recently. Noted gastric outlet obstruction. Pt states he has had 2 EGDs with dilation in the past; noted plan for another in a couple of days. Pt reports he is able to tolerate po well afterwards. Pt is currently on CL diet and tolerating well. Pt wants more to eat. Malnutrition Assessment:  · Malnutrition Status: At risk for malnutrition  · Context: Chronic illness  · Findings of the 6 clinical characteristics of malnutrition (Minimum of 2 out of 6 clinical characteristics is required to make the diagnosis of moderate or severe Protein Calorie Malnutrition based on AND/ASPEN Guidelines):  1. Energy Intake-Less than 75% of estimated energy requirement for greater than 7 days,      2. Weight Loss-5% loss or greater (some weight change may be fluid related), in 1 month  3. Fat Loss-No significant subcutaneous fat loss,    4. Muscle Loss-No significant muscle mass loss,    5. Fluid Accumulation-Moderate to severe fluid accumulation, Extremities  6.  Strength-Not measured    Nutrition Risk Level:  Moderate    Nutrient Needs:  · Estimated Daily Total Kcal: 1700 kcal/day  · Estimated Daily Protein (g):  g pro/day   · Estimated Daily Total Fluid (ml/day):      Nutrition Diagnosis:   · Problem: Inadequate oral intake  · Etiology: related to Alteration in GI function     Signs and symptoms:  as evidenced by  (Clear liquid diet)    Objective Information:  · Nutrition-Focused Physical Findings:  some abdominal pain after eating per pt   · Current Nutrition Therapies:  · Oral Diet Orders: Clear Liquid   · Oral Diet intake:

## 2018-11-17 NOTE — CONSULTS
Three, CN II- XII grossly intact      LABS AND IMAGING:     CBC  Recent Labs      11/16/18   1509  11/16/18   2153  11/17/18   0553   WBC  6.4  5.6  4.9   HGB  11.1*  9.9*  9.9*   HCT  35.8*  30.8*  30.4*   MCV  95.0  94.5  91.8   MCH  29.4  30.4  29.9   MCHC  31.0  32.1  32.6   PLT  245  261  197       BMP  Recent Labs      11/16/18   1509  11/17/18   0553   NA  142  141   K  4.4  3.8   CL  102  103   CO2  26  25   BUN  19  16   CREATININE  0.70  0.70   GLUCOSE  116*  98   CALCIUM  9.2  8.5*       LFTS  Recent Labs      11/16/18   1509   ALKPHOS  109   ALT  8   AST  18   PROT  7.1   BILITOT  0.83   BILIDIR  0.42*   LABALBU  3.6       AMYLASE/LIPASE/AMMONIA  Recent Labs      11/16/18   1509   LIPASE  8*       IMAGING  No results found. ENDOSCOPY     10/01/2018 - EGD with Dilation   FINDINGS:   Esophagus: The esophagus was inspected to the Z-line. The endoscopic exam showed no pathology.      Stomach: The stomach was inspected in both forward and retroflex fashion and was appropriately distensible. The cardia, fundus, incisura, antrum and pylorus were identified via direct visualization. The endoscopic exam showed small amount of thick contents in proximal stomach. Narrowing was noted in mid body (prior banding surgery). The scope was advanced through with mild resistance. The stricture was sequentially balloon dilated to 15 mm. An erythematous patch was noted in antrum- bx obtained.      Duodenum: The proximal small bowel was inspected through the bulb, sweep, and second portion of the duodenum. The endoscopic exam showed no pathology.         RECOMMENDATIONS:   1) Transfer back to the floor for further management as per primary care team.    2) Advance diet to soft as tolerated. Accordingly may discharge home. 3) Follow up on pathology report.   Dakota Miller MD [facility]    IMPRESSION:     1.  Patient with hx of prior gastric banding with complications of gastric outlet obstruction requiring intermittent dilations presented with N/V. PLAN   1. Cl liquid diet. OK to advance to [facility]   2. EGD Monday with anticipated dilation   3. Will need to hold Heparin gtt 4-6 hours prior to EGD   4. Please call GI with any questions, concerns, or acute change in clinical status    This plan was formulated in collaboration with Dr. Gary Arango  Thank you for allowing us to participate in the care of your patient. Electronically signed by: Leane Aase APRN-CNP on 11/17/2018 at 6:53 AM       Attending Supervising Physician's [address] [place] [address] Rd Statement  I performed a history and physical examination on the patient and discussed the management with the nurse practitioner. I reviewed and agree with the findings and plan as documented in her note . N/v. Gastric obstruction due to previous banding. Plan for EGD with dilation to >20 mm. Pt understands the risks and benefits. There is risk of ulceration or perforation.

## 2018-11-17 NOTE — FLOWSHEET NOTE
visited patient per rounding. Patient's wife Jimena Tinsley was also present. Bother were amicable, polite, and conversant. Patient coping appropriately, although he and Jimena Tisnley did express worry over their daughter, a recently discharged patient here.  listened, offered encouragement and assurance of care, to which patient and Jimena Tinsley were very receptive.     -  will remain available as needed for spiritual and emotional care. 11/16/18 1199   Encounter Summary   Services provided to: Patient and family together   Referral/Consult From: Cara [address] (11/16/18)   Complexity of Encounter Low   Length of Encounter 15 minutes   Spiritual Assessment Completed Yes   Routine   Type Initial   Assessment Calm; Approachable  (Conversant, Polite)   Intervention Active listening;Explored feelings, thoughts, concerns;Nurtured hope   Outcome Expressed gratitude;Engaged in conversation;Expressed feelings/needs/concerns;Coping;Encouraged;Receptive

## 2018-11-17 NOTE — PLAN OF CARE
Problem: Respiratory  Intervention: Respiratory assessment  BRONCHOSPASM/BRONCHOCONSTRICTION     [x]         IMPROVE AERATION/BREATH SOUNDS  [x]   ADMINISTER BRONCHODILATOR THERAPY AS APPROPRIATE  [x]   ASSESS BREATH SOUNDS  []   IMPLEMENT AEROSOL/MDI PROTOCOL  [x]   PATIENT EDUCATION AS NEEDED    NON INVASIVE VENTILATION  PROVIDE OPTIMAL VENTILATION/ACCEPTABLE SP02  IMPLEMENT NON INVASIVE VENTILATION PROTOCOL  ASSESSMENT SKIN INTEGRITY  PATIENT EDUCATION AS NEEDED  CPAP AS NEEDED

## 2018-11-17 NOTE — PROGRESS NOTES
Delmi Gardner in the last 72 hours. BNP: No results for input(s): BNP in the last 72 hours. Lipids: No results for input(s): CHOL, TRIG, HDL, LDLCALC in the last 72 hours. Invalid input(s): LDL  ABGs: No results found for: PH, PCO2, PO2, HCO3, O2SAT  Thyroid:   Lab Results   Component Value Date    TSH 2.74 01/04/2016      Urinalysis:   Clarity, UA   Date Value Ref Range Status   08/23/2018 Clear  Final     Color, UA   Date Value Ref Range Status   11/16/2018 DARK YELLOW (A) YEL Final     pH, UA   Date Value Ref Range Status   11/16/2018 5.0 5.0 - 8.0 Final     Specific Gravity, UA   Date Value Ref Range Status   11/16/2018 1.020 1.005 - 1.030 Final     Protein, UA   Date Value Ref Range Status   11/16/2018 NEGATIVE NEG Final     RBC, UA   Date Value Ref Range Status   11/16/2018 2 TO 5 0 - 2 /HPF Final     Blood, UA POC   Date Value Ref Range Status   08/23/2018 Non-Hemolyzed Trace  Final     Bacteria, UA   Date Value Ref Range Status   11/16/2018 FEW (A) NONE Final     Nitrite, Urine   Date Value Ref Range Status   11/16/2018 NEGATIVE NEG Final     WBC, UA   Date Value Ref Range Status   11/16/2018 20 TO 50 0 - 5 /HPF Final     Leukocyte Esterase, Urine   Date Value Ref Range Status   11/16/2018 MODERATE (A) NEG Final     Yeast, UA   Date Value Ref Range Status   11/16/2018 NOT REPORTED NONE Final     Glucose, Ur   Date Value Ref Range Status   11/16/2018 NEGATIVE NEG Final     Bilirubin, UA   Date Value Ref Range Status   08/23/2018 Neg  Final     Bilirubin Urine   Date Value Ref Range Status   11/16/2018 NEGATIVE NEG Final       Assessment:  Active Problems:    Nausea & vomiting  Resolved Problems:    * No resolved hospital problems.  *      Plan:  Gastric outlet obstruction  · Previous hx of dilation last done 10/1/2018  · GI consult placed, potential EGD  · Clear liquid diet, NPO at midnight  · Zofran for nausea     Atrial fibrillation: on xarelto 20 at home, put on heparin gtt now, will switch back if no procedure planned, lopressor PRN for a fib  COPD: RT aerosol protocol  HTN: on toprol XL at home, will hold due to nausea  CHF: on bumex 1mg BID, spironolactone 25mg at home, hold for nausea/obstruction  MARIAMA: CPAP  Narcolepsy: on armodafinil 200 once daily, hold for nausea/obstruction  GERD: on protonix 40 PO, hold for nausea/obstruction    Valencia Ascencio MD             11/17/2018, 7:15 AM

## 2018-11-17 NOTE — PROGRESS NOTES
flow % of Pred or PB []  NA   []  Greater than 90%  []  81-90% []  71-80% []  Less than or equal to 70%  or unable to perform []  Unable due to Respiratory Distress   Dyspnea re []  Patient Baseline [x]  No SOB []  No SOB []  SOB on exertion []  SOB min activity []  At rest/acute   e FEV% Predicted       []  NA []  Above 69%  []  Unable []  Above 60-69%  []  Unable []  Above 50-59%  []  Unable []  Above 35-49%  []  Unable []  Less than 35%  []  Unable          The Respiratory Therapy Department completed the Respiratory Care evaluation. No therapy is indicated at this time. The reason therapy is not indicated is due to the following:     [] Patient does not meet indications for therapy. [x] Patient has returned to their home regimen.    [] Patient frequency has changed to prn, nursing to assess and call if needed  . Respiratory Care will not see this patient further unless otherwise requested. Please call the respiratory care charge therapist with questions or concerns at extension [phone].  Thank you for using the Respiratory Therapy Protocol Program.    Arturo Garcia   9:28 PM

## 2018-11-18 LAB
ABSOLUTE EOS #: 0.25 K/UL (ref 0–0.44)
ABSOLUTE IMMATURE GRANULOCYTE: <0.03 K/UL (ref 0–0.3)
ABSOLUTE LYMPH #: 1.24 K/UL (ref 1.1–3.7)
ABSOLUTE MONO #: 0.62 K/UL (ref 0.1–1.2)
ANION GAP SERPL CALCULATED.3IONS-SCNC: 10 MMOL/L (ref 9–17)
BASOPHILS # BLD: 0 % (ref 0–2)
BASOPHILS ABSOLUTE: <0.03 K/UL (ref 0–0.2)
BUN BLDV-MCNC: 8 MG/DL (ref 8–23)
BUN/CREAT BLD: ABNORMAL (ref 9–20)
CALCIUM SERPL-MCNC: 8.4 MG/DL (ref 8.6–10.4)
CHLORIDE BLD-SCNC: 104 MMOL/L (ref 98–107)
CO2: 23 MMOL/L (ref 20–31)
CREAT SERPL-MCNC: 0.57 MG/DL (ref 0.7–1.2)
CULTURE: ABNORMAL
DIFFERENTIAL TYPE: ABNORMAL
EOSINOPHILS RELATIVE PERCENT: 6 % (ref 1–4)
GFR AFRICAN AMERICAN: >60 ML/MIN
GFR NON-AFRICAN AMERICAN: >60 ML/MIN
GFR SERPL CREATININE-BSD FRML MDRD: ABNORMAL ML/MIN/{1.73_M2}
GFR SERPL CREATININE-BSD FRML MDRD: ABNORMAL ML/MIN/{1.73_M2}
GLUCOSE BLD-MCNC: 100 MG/DL (ref 70–99)
HCT VFR BLD CALC: 32.5 % (ref 40.7–50.3)
HEMOGLOBIN: 10 G/DL (ref 13–17)
IMMATURE GRANULOCYTES: 0 %
LYMPHOCYTES # BLD: 27 % (ref 24–43)
Lab: ABNORMAL
MCH RBC QN AUTO: 29.5 PG (ref 25.2–33.5)
MCHC RBC AUTO-ENTMCNC: 30.8 G/DL (ref 28.4–34.8)
MCV RBC AUTO: 95.9 FL (ref 82.6–102.9)
MONOCYTES # BLD: 14 % (ref 3–12)
NRBC AUTOMATED: 0 PER 100 WBC
ORGANISM: ABNORMAL
PARTIAL THROMBOPLASTIN TIME: 32.2 SEC (ref 20.5–30.5)
PARTIAL THROMBOPLASTIN TIME: 41.8 SEC (ref 20.5–30.5)
PARTIAL THROMBOPLASTIN TIME: 46.2 SEC (ref 20.5–30.5)
PARTIAL THROMBOPLASTIN TIME: 51.8 SEC (ref 20.5–30.5)
PDW BLD-RTO: 14.9 % (ref 11.8–14.4)
PLATELET # BLD: 189 K/UL (ref 138–453)
PLATELET ESTIMATE: ABNORMAL
PMV BLD AUTO: 8.9 FL (ref 8.1–13.5)
POTASSIUM SERPL-SCNC: 3.8 MMOL/L (ref 3.7–5.3)
RBC # BLD: 3.39 M/UL (ref 4.21–5.77)
RBC # BLD: ABNORMAL 10*6/UL
SEG NEUTROPHILS: 53 % (ref 36–65)
SEGMENTED NEUTROPHILS ABSOLUTE COUNT: 2.38 K/UL (ref 1.5–8.1)
SODIUM BLD-SCNC: 137 MMOL/L (ref 135–144)
SPECIMEN DESCRIPTION: ABNORMAL
STATUS: ABNORMAL
WBC # BLD: 4.5 K/UL (ref 3.5–11.3)
WBC # BLD: ABNORMAL 10*3/UL

## 2018-11-18 PROCEDURE — 80048 BASIC METABOLIC PNL TOTAL CA: CPT

## 2018-11-18 PROCEDURE — 97530 THERAPEUTIC ACTIVITIES: CPT

## 2018-11-18 PROCEDURE — 85025 COMPLETE CBC W/AUTO DIFF WBC: CPT

## 2018-11-18 PROCEDURE — 6360000002 HC RX W HCPCS: Performed by: STUDENT IN AN ORGANIZED HEALTH CARE EDUCATION/TRAINING PROGRAM

## 2018-11-18 PROCEDURE — 99232 SBSQ HOSP IP/OBS MODERATE 35: CPT | Performed by: SURGERY

## 2018-11-18 PROCEDURE — 85730 THROMBOPLASTIN TIME PARTIAL: CPT

## 2018-11-18 PROCEDURE — 97166 OT EVAL MOD COMPLEX 45 MIN: CPT

## 2018-11-18 PROCEDURE — 2580000003 HC RX 258: Performed by: STUDENT IN AN ORGANIZED HEALTH CARE EDUCATION/TRAINING PROGRAM

## 2018-11-18 PROCEDURE — 1200000000 HC SEMI PRIVATE

## 2018-11-18 PROCEDURE — 97535 SELF CARE MNGMENT TRAINING: CPT

## 2018-11-18 PROCEDURE — 36415 COLL VENOUS BLD VENIPUNCTURE: CPT

## 2018-11-18 PROCEDURE — 94660 CPAP INITIATION&MGMT: CPT

## 2018-11-18 PROCEDURE — 6370000000 HC RX 637 (ALT 250 FOR IP): Performed by: STUDENT IN AN ORGANIZED HEALTH CARE EDUCATION/TRAINING PROGRAM

## 2018-11-18 PROCEDURE — G8979 MOBILITY GOAL STATUS: HCPCS

## 2018-11-18 PROCEDURE — G8987 SELF CARE CURRENT STATUS: HCPCS

## 2018-11-18 PROCEDURE — C9113 INJ PANTOPRAZOLE SODIUM, VIA: HCPCS | Performed by: STUDENT IN AN ORGANIZED HEALTH CARE EDUCATION/TRAINING PROGRAM

## 2018-11-18 PROCEDURE — 97162 PT EVAL MOD COMPLEX 30 MIN: CPT

## 2018-11-18 PROCEDURE — G8978 MOBILITY CURRENT STATUS: HCPCS

## 2018-11-18 PROCEDURE — 99232 SBSQ HOSP IP/OBS MODERATE 35: CPT | Performed by: INTERNAL MEDICINE

## 2018-11-18 PROCEDURE — G8988 SELF CARE GOAL STATUS: HCPCS

## 2018-11-18 RX ORDER — PANTOPRAZOLE SODIUM 40 MG/1
40 TABLET, DELAYED RELEASE ORAL
Status: DISCONTINUED | OUTPATIENT
Start: 2018-11-19 | End: 2018-11-20 | Stop reason: HOSPADM

## 2018-11-18 RX ORDER — UREA 10 %
3 LOTION (ML) TOPICAL NIGHTLY PRN
Status: DISCONTINUED | OUTPATIENT
Start: 2018-11-18 | End: 2018-11-20 | Stop reason: HOSPADM

## 2018-11-18 RX ORDER — NITROFURANTOIN 25; 75 MG/1; MG/1
100 CAPSULE ORAL EVERY 12 HOURS SCHEDULED
Status: DISCONTINUED | OUTPATIENT
Start: 2018-11-18 | End: 2018-11-20 | Stop reason: HOSPADM

## 2018-11-18 RX ADMIN — Medication 10 ML: at 12:53

## 2018-11-18 RX ADMIN — PANTOPRAZOLE SODIUM 40 MG: 40 INJECTION, POWDER, FOR SOLUTION INTRAVENOUS at 12:52

## 2018-11-18 RX ADMIN — THERA TABS 1 TABLET: TAB at 09:13

## 2018-11-18 RX ADMIN — NITROFURANTOIN MONOHYDRATE/MACROCRYSTALLINE 100 MG: 25; 75 CAPSULE ORAL at 20:05

## 2018-11-18 RX ADMIN — HEPARIN SODIUM 2000 UNITS: 1000 INJECTION, SOLUTION INTRAVENOUS; SUBCUTANEOUS at 07:18

## 2018-11-18 RX ADMIN — FLUTICASONE PROPIONATE 1 SPRAY: 50 SPRAY, METERED NASAL at 09:13

## 2018-11-18 RX ADMIN — HEPARIN SODIUM AND DEXTROSE 990 UNITS/HR: 10000; 5 INJECTION INTRAVENOUS at 07:18

## 2018-11-18 RX ADMIN — NITROFURANTOIN MONOHYDRATE/MACROCRYSTALLINE 100 MG: 25; 75 CAPSULE ORAL at 12:52

## 2018-11-18 RX ADMIN — SODIUM CHLORIDE: 9 INJECTION, SOLUTION INTRAVENOUS at 21:14

## 2018-11-18 RX ADMIN — SODIUM CHLORIDE: 9 INJECTION, SOLUTION INTRAVENOUS at 05:39

## 2018-11-18 RX ADMIN — CEFTRIAXONE SODIUM 1 G: 1 INJECTION, POWDER, FOR SOLUTION INTRAMUSCULAR; INTRAVENOUS at 02:40

## 2018-11-18 RX ADMIN — HEPARIN SODIUM 2000 UNITS: 1000 INJECTION, SOLUTION INTRAVENOUS; SUBCUTANEOUS at 14:28

## 2018-11-18 RX ADMIN — Medication 3 MG: at 22:11

## 2018-11-18 ASSESSMENT — PAIN DESCRIPTION - ORIENTATION
ORIENTATION: RIGHT;LEFT

## 2018-11-18 ASSESSMENT — PAIN SCALES - GENERAL
PAINLEVEL_OUTOF10: 3
PAINLEVEL_OUTOF10: 2

## 2018-11-18 ASSESSMENT — PAIN DESCRIPTION - DESCRIPTORS
DESCRIPTORS: ACHING
DESCRIPTORS: ACHING

## 2018-11-18 ASSESSMENT — PAIN DESCRIPTION - ONSET
ONSET: ON-GOING
ONSET: ON-GOING

## 2018-11-18 ASSESSMENT — PAIN DESCRIPTION - LOCATION
LOCATION: KNEE
LOCATION: ANKLE;KNEE

## 2018-11-18 ASSESSMENT — PAIN DESCRIPTION - FREQUENCY
FREQUENCY: INTERMITTENT
FREQUENCY: INTERMITTENT

## 2018-11-18 ASSESSMENT — PAIN DESCRIPTION - PAIN TYPE
TYPE: CHRONIC PAIN

## 2018-11-18 NOTE — PROGRESS NOTES
The lab called with the critical value for aPTT of >120.0. The attending provider has been notified of the result. The heparin has been held for an hour and will decrease the rate by 4 units/kg/hr per protocol.

## 2018-11-18 NOTE — PROGRESS NOTES
Physical Therapy    Facility/Department: [facility]  Initial Assessment    NAME: Miko Kramer  : 1945  MRN: [id number]  The patient is a 68 y.o.  male who is admitted to the hospital for vomiting and dehydration. Patient has distant history of gastric banding and has recently experienced problems with gastric outlet obstruction. He has had multiple procedures for dilation of the gastric banding area most recently done 2018 and 10/1/2018 and has been placed on soft diet. Patient reports for last 5 days he has been vomiting and unable to keep anything down. Patient reports vomitus consisted of food but for the past day noticed his vomitus become dark. However he denies grant blood or coffee ground emesis. Episodes of vomiting are accompanied with sharp pain and patient additionally experiences discomfort when eating. Patient has decreased PO intake but has not had loss of appetite. Patient additionally has weakness and dizziness. Patient denies any bloody bowel movement and has not had bowel movement for past 4 days.   In the ED, BMP, CBC, and UA are taken and no abnormalities were found. Bariatrics was consulted and recommended GI consult for EGD dilation. Patient admitted for management of potential gastric outlet obstruction. Date of Service: 2018    Discharge Recommendations:  Home with assist PRN   PT Equipment Recommendations  Equipment Needed: No    Patient Diagnosis(es): The primary encounter diagnosis was Non-intractable vomiting with nausea, unspecified vomiting type. A diagnosis of Dysuria was also pertinent to this visit. has a past medical history of Asthma; Atrial fibrillation ([address] GERD (gastroesophageal reflux disease); Hyperlipidemia; Hypertension; Obesity; Osteoarthritis; and Unspecified sleep apnea. has a past surgical history that includes Finger amputation (); Gastric bypass surgery ();  Carpal tunnel release; Colonoscopy; Rotator cuff repair; JACOB  Stairs/Curb  Stairs?: No     Balance  Posture: Fair  Sitting - Static: Good  Sitting - Dynamic: Good  Standing - Static: Good;-  Standing - Dynamic: Fair;+  Comments: standing balance assessed with RW        Assessment   Body structures, Functions, Activity limitations: Decreased functional mobility ; Decreased endurance;Decreased balance;Decreased ADL status; Decreased strength  Assessment: Pt ambulated 130ft with RW and CGA. Pt would benefit from continued acute PT and should be safe to return home upon discharge. Prognosis: Good  Decision Making: Medium Complexity  Patient Education: POC, importance of mobility, purpose of eval  REQUIRES PT FOLLOW UP: Yes  Activity Tolerance  Activity Tolerance: Patient Tolerated treatment well         Plan   Plan  Times per week: 5x  Current Treatment Recommendations: Strengthening, Balance Training, Functional Mobility Training, Transfer Training, Home Exercise Program, Safety Education & Training, Patient/Caregiver Education & Training, Endurance Training, Gait Training, Stair training  Safety Devices  Type of devices: Nurse notified, Left in chair, Gait belt, Call light within reach  Restraints  Initially in place: No    G-Code  PT G-Codes  Functional Limitation: Mobility: Walking and moving around  Mobility: Walking and Moving Around Current Status (): At least 40 percent but less than 60 percent impaired, limited or restricted  Mobility: Walking and Moving Around Goal Status ():  At least 1 percent but less than 20 percent impaired, limited or restricted    AM-PAC Score     AM-PAC Inpatient Mobility without Stair Climbing Raw Score : 15  AM-PAC Inpatient without Stair Climbing T-Scale Score : 43.03  Mobility Inpatient CMS 0-100% Score: 47.43  Mobility Inpatient without Stair CMS G-Code Modifier : CK       Goals  Short term goals  Time Frame for Short term goals: 14 visits  Short term goal 1: Mod I for bed mobility and functional transfers  Short term goal 2:

## 2018-11-18 NOTE — PROGRESS NOTES
Yes  Activity Tolerance  Activity Tolerance: Patient Tolerated treatment well  Safety Devices  Safety Devices in place: Yes  Type of devices: Gait belt;Call light within reach; Left in chair  Restraints  Initially in place: No         Plan   Plan  Times per week: 3-4x/wk     G-Code  OT G-codes  Functional Assessment Tool Used: [facility]  Score: 20/24  Functional Limitation: Self care  Self Care Current Status (): At least 20 percent but less than 40 percent impaired, limited or restricted  Self Care Goal Status (): At least 1 percent but less than 20 percent impaired, limited or restricted    AM-PAC Score      How much help from another person does the pt currently need? Unable A Lot A Little None   1. Putting on and taking off regular lower body clothing? 1      2      3       4   2. Bathing (including washing, rinsing, drying)? 1      2      3      4   3. Toileting, which includes using toilet, bedpan, or urinal?      1      2        3      4   4. Putting on and taking off regular upper body clothing? 1      2      3       4   5. Taking care of personal grooming such as brushing teeth? 1      2      3      4   6. Eating meals? 1      2       3       4     1. Unable = Total/Dependent Assist  2. A Lot = Maximum/Moderate Assist  3. A Little = Minimum/Contact Guard Assist/Supervision  4.  None= Modified [place]/Independent    Raw Score Scale Score Scale Score Standard Error Approximate Degree of Functional Impairment     6 17.07 3.74 100%   7 20.13 3.68 92%   8 22.86 3.43 86%   9 25.33 3.17 80%   10 27.31 2.96 75%   11 29.04 2.79 70%   12 30.60 2.68 67%   13 32.03 2.62 63%   14 33.39 2.61 60%   15 34.69 2.65 56%   16 35.96 2.71 53%   17 37.26 2.82 50%   18 38.66 2.97 47%   19 40.22 3.20 43%   20 42.03 3.55 38%   21 44.27 4.08 33%   22 47.10 4.81 26%   23 51.12 5.88 16%   24 57.54 7.36 0%     Goals  Short term goals  Time Frame for Short term goals: pt will, by discharge  Short term goal 1: dem mod I during functional transfers/functional mobility with LRD  Short term goal 2: dem understanding and initiate use of 2-3 energy conservation tech during functional tasks  Short term goal 3: dem 20+ minutes activities tolerance in order to complete functional tasks  Short term goal 4: dem 10+ minutes dynamic standing tolerance with LRD and mod I in order to complete ADLs  Short term goal 5: complete LB ADLs while seated with SBA and AE, as needed     Therapy Time   Individual Concurrent Group Co-treatment   Time In 1340         Time Out 1357         Minutes 17              Discharge recommendations discussed with patient during initial evaluation.     Cindi Joseph, OTR/L

## 2018-11-18 NOTE — PLAN OF CARE
NONINVASIVE VENTILATION     PROVIDE OPTIMAL VENTILATION/ACCEPTABLE SPO2              IMPLEMENT NONINVASIVE VENTILATION PROTOCOL              MAINTAIN ACCEPTABLE SPO2              ASSESS SKIN INTEGRITY/BREAKDOWN SCORE              PATIENT EDUCATION AS NEEDED              CPAP AS NEEDED    Pt not ready for CPAP yet this evening.   Pt requests RT to return before 10pm.

## 2018-11-19 ENCOUNTER — HOSPITAL ENCOUNTER (OUTPATIENT)
Dept: PHYSICAL THERAPY | Facility: CLINIC | Age: 73
Setting detail: THERAPIES SERIES
Discharge: HOME | End: 2018-11-19
Payer: COMMERCIAL

## 2018-11-19 ENCOUNTER — ANESTHESIA EVENT (OUTPATIENT)
Dept: ENDOSCOPY | Age: 73
DRG: 394 | End: 2018-11-19
Payer: COMMERCIAL

## 2018-11-19 ENCOUNTER — ANESTHESIA (OUTPATIENT)
Dept: ENDOSCOPY | Age: 73
DRG: 394 | End: 2018-11-19
Payer: COMMERCIAL

## 2018-11-19 VITALS
DIASTOLIC BLOOD PRESSURE: 49 MMHG | OXYGEN SATURATION: 99 % | SYSTOLIC BLOOD PRESSURE: 85 MMHG | RESPIRATION RATE: 12 BRPM

## 2018-11-19 LAB
ABSOLUTE EOS #: 0.19 K/UL (ref 0–0.44)
ABSOLUTE IMMATURE GRANULOCYTE: <0.03 K/UL (ref 0–0.3)
ABSOLUTE LYMPH #: 1.08 K/UL (ref 1.1–3.7)
ABSOLUTE MONO #: 0.52 K/UL (ref 0.1–1.2)
ANION GAP SERPL CALCULATED.3IONS-SCNC: 11 MMOL/L (ref 9–17)
BASOPHILS # BLD: 1 % (ref 0–2)
BASOPHILS ABSOLUTE: 0.03 K/UL (ref 0–0.2)
BUN BLDV-MCNC: 5 MG/DL (ref 8–23)
BUN/CREAT BLD: ABNORMAL (ref 9–20)
CALCIUM SERPL-MCNC: 8.3 MG/DL (ref 8.6–10.4)
CHLORIDE BLD-SCNC: 106 MMOL/L (ref 98–107)
CO2: 23 MMOL/L (ref 20–31)
CREAT SERPL-MCNC: 0.5 MG/DL (ref 0.7–1.2)
DIFFERENTIAL TYPE: ABNORMAL
EOSINOPHILS RELATIVE PERCENT: 5 % (ref 1–4)
GFR AFRICAN AMERICAN: >60 ML/MIN
GFR NON-AFRICAN AMERICAN: >60 ML/MIN
GFR SERPL CREATININE-BSD FRML MDRD: ABNORMAL ML/MIN/{1.73_M2}
GFR SERPL CREATININE-BSD FRML MDRD: ABNORMAL ML/MIN/{1.73_M2}
GLUCOSE BLD-MCNC: 100 MG/DL (ref 70–99)
HCT VFR BLD CALC: 29.8 % (ref 40.7–50.3)
HEMOGLOBIN: 9.2 G/DL (ref 13–17)
IMMATURE GRANULOCYTES: 0 %
LYMPHOCYTES # BLD: 29 % (ref 24–43)
MAGNESIUM: 1.8 MG/DL (ref 1.6–2.6)
MCH RBC QN AUTO: 29.3 PG (ref 25.2–33.5)
MCHC RBC AUTO-ENTMCNC: 30.9 G/DL (ref 28.4–34.8)
MCV RBC AUTO: 94.9 FL (ref 82.6–102.9)
MONOCYTES # BLD: 14 % (ref 3–12)
NRBC AUTOMATED: 0 PER 100 WBC
PARTIAL THROMBOPLASTIN TIME: 27.3 SEC (ref 20.5–30.5)
PARTIAL THROMBOPLASTIN TIME: 52.2 SEC (ref 20.5–30.5)
PDW BLD-RTO: 14.8 % (ref 11.8–14.4)
PLATELET # BLD: 160 K/UL (ref 138–453)
PLATELET ESTIMATE: ABNORMAL
PMV BLD AUTO: 8.9 FL (ref 8.1–13.5)
POTASSIUM SERPL-SCNC: 3.5 MMOL/L (ref 3.7–5.3)
RBC # BLD: 3.14 M/UL (ref 4.21–5.77)
RBC # BLD: ABNORMAL 10*6/UL
SEG NEUTROPHILS: 51 % (ref 36–65)
SEGMENTED NEUTROPHILS ABSOLUTE COUNT: 1.89 K/UL (ref 1.5–8.1)
SODIUM BLD-SCNC: 140 MMOL/L (ref 135–144)
WBC # BLD: 3.7 K/UL (ref 3.5–11.3)
WBC # BLD: ABNORMAL 10*3/UL

## 2018-11-19 PROCEDURE — C1773 RET DEV, INSERTABLE: HCPCS | Performed by: INTERNAL MEDICINE

## 2018-11-19 PROCEDURE — 6360000002 HC RX W HCPCS: Performed by: STUDENT IN AN ORGANIZED HEALTH CARE EDUCATION/TRAINING PROGRAM

## 2018-11-19 PROCEDURE — 1200000000 HC SEMI PRIVATE

## 2018-11-19 PROCEDURE — 36415 COLL VENOUS BLD VENIPUNCTURE: CPT

## 2018-11-19 PROCEDURE — 83735 ASSAY OF MAGNESIUM: CPT

## 2018-11-19 PROCEDURE — 7100000011 HC PHASE II RECOVERY - ADDTL 15 MIN: Performed by: INTERNAL MEDICINE

## 2018-11-19 PROCEDURE — 0D768ZZ DILATION OF STOMACH, VIA NATURAL OR ARTIFICIAL OPENING ENDOSCOPIC: ICD-10-PCS | Performed by: INTERNAL MEDICINE

## 2018-11-19 PROCEDURE — 3700000001 HC ADD 15 MINUTES (ANESTHESIA): Performed by: INTERNAL MEDICINE

## 2018-11-19 PROCEDURE — 3609012500 HC EGD DILATION BALLOON: Performed by: INTERNAL MEDICINE

## 2018-11-19 PROCEDURE — 6360000002 HC RX W HCPCS: Performed by: SPECIALIST

## 2018-11-19 PROCEDURE — 3700000000 HC ANESTHESIA ATTENDED CARE: Performed by: INTERNAL MEDICINE

## 2018-11-19 PROCEDURE — C1726 CATH, BAL DIL, NON-VASCULAR: HCPCS | Performed by: INTERNAL MEDICINE

## 2018-11-19 PROCEDURE — 6370000000 HC RX 637 (ALT 250 FOR IP): Performed by: STUDENT IN AN ORGANIZED HEALTH CARE EDUCATION/TRAINING PROGRAM

## 2018-11-19 PROCEDURE — 43248 EGD GUIDE WIRE INSERTION: CPT | Performed by: INTERNAL MEDICINE

## 2018-11-19 PROCEDURE — 80048 BASIC METABOLIC PNL TOTAL CA: CPT

## 2018-11-19 PROCEDURE — 43247 EGD REMOVE FOREIGN BODY: CPT | Performed by: INTERNAL MEDICINE

## 2018-11-19 PROCEDURE — 0DC68ZZ EXTIRPATION OF MATTER FROM STOMACH, VIA NATURAL OR ARTIFICIAL OPENING ENDOSCOPIC: ICD-10-PCS | Performed by: INTERNAL MEDICINE

## 2018-11-19 PROCEDURE — 7100000010 HC PHASE II RECOVERY - FIRST 15 MIN: Performed by: INTERNAL MEDICINE

## 2018-11-19 PROCEDURE — 2709999900 HC NON-CHARGEABLE SUPPLY: Performed by: INTERNAL MEDICINE

## 2018-11-19 PROCEDURE — 2580000003 HC RX 258: Performed by: STUDENT IN AN ORGANIZED HEALTH CARE EDUCATION/TRAINING PROGRAM

## 2018-11-19 PROCEDURE — 99232 SBSQ HOSP IP/OBS MODERATE 35: CPT | Performed by: SURGERY

## 2018-11-19 PROCEDURE — 85025 COMPLETE CBC W/AUTO DIFF WBC: CPT

## 2018-11-19 PROCEDURE — 85730 THROMBOPLASTIN TIME PARTIAL: CPT

## 2018-11-19 PROCEDURE — 2500000003 HC RX 250 WO HCPCS: Performed by: SPECIALIST

## 2018-11-19 RX ORDER — HEPARIN SODIUM 10000 [USP'U]/100ML
1000 INJECTION, SOLUTION INTRAVENOUS CONTINUOUS
Status: DISCONTINUED | OUTPATIENT
Start: 2018-11-19 | End: 2018-11-20

## 2018-11-19 RX ORDER — LIDOCAINE HYDROCHLORIDE 10 MG/ML
INJECTION, SOLUTION EPIDURAL; INFILTRATION; INTRACAUDAL; PERINEURAL PRN
Status: DISCONTINUED | OUTPATIENT
Start: 2018-11-19 | End: 2018-11-19 | Stop reason: SDUPTHER

## 2018-11-19 RX ORDER — PROPOFOL 10 MG/ML
INJECTION, EMULSION INTRAVENOUS PRN
Status: DISCONTINUED | OUTPATIENT
Start: 2018-11-19 | End: 2018-11-19 | Stop reason: SDUPTHER

## 2018-11-19 RX ADMIN — LIDOCAINE HYDROCHLORIDE 50 MG: 10 INJECTION, SOLUTION EPIDURAL; INFILTRATION; INTRACAUDAL at 11:05

## 2018-11-19 RX ADMIN — SODIUM CHLORIDE: 9 INJECTION, SOLUTION INTRAVENOUS at 20:44

## 2018-11-19 RX ADMIN — PROPOFOL 40 MG: 10 INJECTION, EMULSION INTRAVENOUS at 11:15

## 2018-11-19 RX ADMIN — NITROFURANTOIN MONOHYDRATE/MACROCRYSTALLINE 100 MG: 25; 75 CAPSULE ORAL at 09:41

## 2018-11-19 RX ADMIN — HEPARIN SODIUM AND DEXTROSE 1000 UNITS/HR: 10000; 5 INJECTION INTRAVENOUS at 19:21

## 2018-11-19 RX ADMIN — HEPARIN SODIUM 4000 UNITS: 1000 INJECTION, SOLUTION INTRAVENOUS; SUBCUTANEOUS at 19:21

## 2018-11-19 RX ADMIN — SODIUM CHLORIDE: 9 INJECTION, SOLUTION INTRAVENOUS at 11:20

## 2018-11-19 RX ADMIN — NITROFURANTOIN MONOHYDRATE/MACROCRYSTALLINE 100 MG: 25; 75 CAPSULE ORAL at 20:43

## 2018-11-19 RX ADMIN — THERA TABS 1 TABLET: TAB at 09:41

## 2018-11-19 RX ADMIN — FLUTICASONE PROPIONATE 1 SPRAY: 50 SPRAY, METERED NASAL at 09:42

## 2018-11-19 RX ADMIN — PROPOFOL 100 MG: 10 INJECTION, EMULSION INTRAVENOUS at 11:10

## 2018-11-19 RX ADMIN — HEPARIN SODIUM AND DEXTROSE 1190 UNITS/HR: 10000; 5 INJECTION INTRAVENOUS at 00:19

## 2018-11-19 RX ADMIN — PROPOFOL 100 MG: 10 INJECTION, EMULSION INTRAVENOUS at 11:05

## 2018-11-19 ASSESSMENT — PAIN SCALES - GENERAL
PAINLEVEL_OUTOF10: 0

## 2018-11-19 ASSESSMENT — ENCOUNTER SYMPTOMS
SHORTNESS OF BREATH: 0
STRIDOR: 0

## 2018-11-19 NOTE — CARE COORDINATION
Case Management Initial Discharge Plan  Maribel Hughes,             Met with:patient to discuss discharge plans. Information verified: address, contacts, phone number, , insurance Yes  PCP: Sean Donnelly PA-C  Date of last visit: less than a year    Insurance Provider: Yesika Overton    Discharge Planning    Living Arrangements:  Spouse/Significant Other, Family Members   Support Systems:  Children, Family Members, Friends/Neighbors    Home has 1 stories  6 stairs to climb to get into front door, stairs to climb to reach second floor  Location of bedroom/bathroom in home main    Patient able to perform ADL's:Independent    Current Services (outpatient & in home) none  DME equipment: CPap  DME provider: [address] [place] [address] Avenue: [address] [place] [address] Medications:  No  Does patient want to participate in local refill/ meds to beds program?  No    Potential Assistance Needed:  N/A    Patient agreeable to home care: No  Justin of choice provided:  no    Prior SNF/Rehab Placement and Facility: no  Agreeable to SNF/Rehab: No  Justin of choice provided: no   Evaluation: n/a    Expected Discharge date:  18  Patient expects to be discharged to:  Home  Follow Up Appointment: Best Day/ Time: Wednesday AM    Transportation provider: medical jann  Transportation arrangements needed for discharge: Yes    Readmission Risk              Risk of Unplanned Readmission:        23             Does patient have a readmission risk score greater than 14?: Yes  If yes, follow-up appointment must be made within 7 days of discharge.      Discharge Plan: home independently - need medical cab, PCP appt          Electronically signed by Ayo Moya RN on 18 at 3:46 PM
Health Coordinator?:  No  Durable Medical Equipment  Patient DME:  Jenny lucas, Other  Other Patient DME:  Raised toilet seat  Patient Home Equipment:  Nebulizer, CPAP  Functional Review  Ability to seek help/take action for Emergent/Urgent situations i.e. fire, crime, inclement weather or health crisis. :  Independent  Ability handle personal hygiene needs (bathing/dressing/grooming): Independent  Ability to manage medications:  Needs Assistance  Ability to prepare food:  Independent  Ability to maintain home (clean home, laundry):  Needs Assistance  Ability to drive and/or has transportation:  Dependent  Ability to do shopping:  Needs Assistance  Ability to manage finances: Independent  Is patient able to live independently?:  Yes  Hearing and Vision  Visual Impairment:  Reading glasses  Hearing Impairment:  Wears hearing aids  Care Transitions Interventions     Other Services:  (Comment: Call to PCP, attempt to arrange appointment within parameters for medical transport. )      Transportation Support:  Completed          Follow Up  Future Appointments  Date Time Provider Jeri Flynn   11/26/2018 9:00 AM VICKIE Mehta PT [facility]   11/29/2018 9:45 AM VICKIE Mehta PT [facility]   12/12/2018 9:20 AM ADELINE Edwards [facility]   1/10/2019 7:30 AM Jimena Cook, DO bariatric [facility] [address] Maintenance  There are no preventive care reminders to display for this patient.     Ignacia Covarrubias RN

## 2018-11-19 NOTE — PROGRESS NOTES
PATIENT REFUSES TO WEAR BIPAP     [x] Risks and benefits explained to patient   [x] Patient refuses to wear Bipap stating I just cant this is too different from my home one  [x] Patient verbalizes understanding of information presented.

## 2018-11-19 NOTE — PLAN OF CARE
Problem: Pain:  Goal: Pain level will decrease  Pain level will decrease   Outcome: Ongoing    Goal: Control of acute pain  Control of acute pain   Outcome: Ongoing    Goal: Control of chronic pain  Control of chronic pain   Outcome: Ongoing      Problem: Falls - Risk of:  Goal: Will remain free from falls  Will remain free from falls   Outcome: Ongoing    Goal: Absence of physical injury  Absence of physical injury   Outcome: Ongoing      Problem: Risk for Impaired Skin Integrity  Goal: Tissue integrity - skin and mucous membranes  Structural intactness and normal physiological function of skin and  mucous membranes.    Outcome: Ongoing      Problem: Nutrition  Goal: Optimal nutrition therapy  Outcome: Ongoing

## 2018-11-19 NOTE — ANESTHESIA PRE PROCEDURE
Take 5 mg by mouth 4 times daily (before meals and nightly)  8/19/18   Miles Lewis MD   sucralfate (CARAFATE) 1 GM tablet Take 1 tablet by mouth 4 times daily 8/19/18   Miles Lewis MD   Incontinence Supply Disposable (INCONTINENCE BRIEF LARGE) MISC To use as directed. Use 3x a day 6/20/18   Amos Best PA-C   polyethylene glycol [facility]) powder Take 17 g by mouth daily 6/20/18   Amos Best PA-C   rivaroxaban (XARELTO) 20 MG TABS tablet Take 1 tablet by mouth daily (with breakfast) 6/20/18   Amos Best PA-C   ondansetron (ZOFRAN-ODT) 4 MG disintegrating tablet Take 4 mg by mouth 4/29/18   Historical Provider, MD   ipratropium-albuterol (DUONEB) 0.5-2.5 (3) MG/3ML SOLN nebulizer solution Inhale 1 vial into the lungs every 4 hours    Historical Provider, MD   Armodafinil 200 MG TABS Take 1 tablet by mouth every morning. . 4/2/18   Historical Provider, MD   ciclopirox (PENLAC) 8 % solution Apply topically nightly to nails 12/1/17   Sonal Swann MD   SYMBICORT 160-4.5 MCG/ACT AERO  10/9/17   Historical Provider, MD   albuterol (PROVENTIL HFA;VENTOLIN HFA) 108 (90 BASE) MCG/ACT inhaler Inhale 2 puffs into the lungs every 6 hours as needed for Wheezing.     Historical Provider, MD       Current medications:    Current Facility-Administered Medications   Medication Dose Route Frequency Provider Last Rate Last Dose    nitrofurantoin (macrocrystal-monohydrate) (MACROBID) capsule 100 mg  100 mg Oral 2 times per day Estella Oconnor MD   100 mg at 11/19/18 0941    melatonin tablet 3 mg  3 mg Oral Nightly PRN Livan Preez MD   3 mg at 11/18/18 2211    pantoprazole (PROTONIX) tablet 40 mg  40 mg Oral QAM AC Cata Miles MD   Stopped at 11/19/18 0618    ciclopirox (PENLAC) 8 % solution   Topical Nightly Cata Miles MD        fluticasone (FLONASE) 50 MCG/ACT nasal spray 1 spray  1 spray Each Nare Daily Cata Miles MD   1 spray at 11/19/18 1753    multivitamin 1 tablet  1 tablet at [facility] 10/1/2018    EGD BIOPSY performed by Moon Alicia MD at [facility] ENDOSCOPY  10/1/2018    EGD DILATION BALLOON performed by Moon Alicia MD at [facility]       Social History:    Social History   Substance Use Topics    Smoking status: Former Smoker     Packs/day: 1.00     Years: 20.00     Quit date: 12/14/1976    Smokeless tobacco: Never Used    Alcohol use No                                Counseling given: Not Answered      Vital Signs (Current):   Vitals:    11/18/18 0815 11/18/18 1836 11/19/18 0630 11/19/18 0734   BP: 106/60 121/62  109/66   Pulse: 79 91  71   Resp: 14 20  16   Temp: 36.7 °C (98 °F) 36.3 °C (97.4 °F)  36.5 °C (97.7 °F)   TempSrc: Oral Oral  Oral   SpO2: 97% 99%  99%   Weight:   229 lb 6.4 oz (104.1 kg)    Height:                                                  BP Readings from Last 3 Encounters:   11/19/18 109/66   11/07/18 122/60   10/31/18 100/61       NPO Status:mn                                                                                 BMI:   Wt Readings from Last 3 Encounters:   11/19/18 229 lb 6.4 oz (104.1 kg)   11/07/18 235 lb (106.6 kg)   10/31/18 228 lb 8 oz (103.6 kg)     Body mass index is 35.4 kg/m².     CBC:   Lab Results   Component Value Date    WBC 3.7 11/19/2018    RBC 3.14 11/19/2018    RBC 4.39 09/13/2011    HGB 9.2 11/19/2018    HCT 29.8 11/19/2018    MCV 94.9 11/19/2018    RDW 14.8 11/19/2018     11/19/2018     09/13/2011       CMP:   Lab Results   Component Value Date     11/19/2018    K 3.5 11/19/2018     11/19/2018    CO2 23 11/19/2018    BUN 5 11/19/2018    CREATININE 0.50 11/19/2018    GFRAA >60 11/19/2018    LABGLOM >60 11/19/2018    GLUCOSE 100 11/19/2018    GLUCOSE 99 09/13/2011    PROT 7.1 11/16/2018    CALCIUM 8.3 11/19/2018    BILITOT 0.83 11/16/2018    ALKPHOS 109 11/16/2018    AST 18 11/16/2018    ALT 8 11/16/2018       POC Tests:

## 2018-11-19 NOTE — PROGRESS NOTES
drop, right    Impaired hearing    Chronic rhinosinusitis    Lymphedema of both lower extremities    Hallux valgus, acquired, bilateral    Gait disorder    S/P revision of total knee    Fracture of femur ([address]    Hypertension    Slow transit constipation    Internal hemorrhoids    Calculus of gallbladder without cholecystitis without obstruction    MARIAMA on CPAP    Chronic diastolic heart failure (HCC)    Moderate obesity    Simple chronic bronchitis (HCC)    History of esophagogastroduodenoscopy (EGD)    Acute superficial gastritis without hemorrhage    Acute diastolic congestive heart failure (HCC)    Gastric out let obstruction    Gastric anastomotic stricture    Nausea & vomiting     69 Yo M w/ Hx of VBG over 21 ys ago and persistent gastric outlet obstruction type symptoms, prior EGDs with dilation, due to previous gastric restrictive procedures      EGD with GI today for dilation  Medical management per primary  Plan for follow up as outpatient for discussion of surgical management.     Electronically signed by Jair Tenorio DO on 11/19/2018 at 6:49 AM

## 2018-11-19 NOTE — PROGRESS NOTES
appearance - alert, well appearing, and in no distress  Mental status - alert, oriented to person, place, and time  Head- normocephalic, no lesions, without obvious abnormality  Eyes - pupils equal and reactive, extraocular eye movements intact  Ears - bilateral TM's and external ear canals normal  Nose - normal and patent, no erythema, discharge or polyps  Mouth - mucous membranes moist, pharynx normal without lesions  Neck - supple, no significant adenopathy  Chest - clear to auscultation, no wheezes, rales or rhonchi, symmetric air entry  Heart - normal rate, regular rhythm, normal S1, S2, no murmurs, rubs, clicks or gallops  Abdomen - soft, nondistended, no masses or organomegaly, mild tenderness in epigastric region  Neurological - alert, oriented, normal speech, no focal findings or movement disorder noted  Extremities - peripheral pulses normal, no pedal edema, no clubbing or cyanosis  Skin - normal coloration and turgor, no rashes, no suspicious skin lesions noted     Labs:-    CBC:   Recent Labs      11/17/18   0553  11/18/18   0555  11/19/18   0552   WBC  4.9  4.5  3.7   HGB  9.9*  10.0*  9.2*   PLT  197  189  160     BMP:    Recent Labs      11/17/18   0553  11/18/18   0555  11/19/18   0552   NA  141  137  140   K  3.8  3.8  3.5*   CL  103  104  106   CO2  25  23  23   BUN  16  8  5*   CREATININE  0.70  0.57*  0.50*   GLUCOSE  98  100*  100*     Calcium:  Recent Labs      11/19/18   0552   CALCIUM  8.3*     Ionized Calcium:No results for input(s): IONCA in the last 72 hours. Magnesium:  Recent Labs      11/19/18   0552   MG  1.8     Phosphorus:No results for input(s): PHOS in the last 72 hours. BNP:No results for input(s): BNP in the last 72 hours. Glucose:No results for input(s): POCGLU in the last 72 hours. HgbA1C: No results for input(s): LABA1C in the last 72 hours. INR: No results for input(s): INR in the last 72 hours.   Hepatic:   Recent Labs      11/16/18   1509   ALKPHOS  109   ALT  8   AST

## 2018-11-20 VITALS
SYSTOLIC BLOOD PRESSURE: 119 MMHG | TEMPERATURE: 97.5 F | RESPIRATION RATE: 18 BRPM | HEIGHT: 68 IN | OXYGEN SATURATION: 100 % | HEART RATE: 67 BPM | BODY MASS INDEX: 34.77 KG/M2 | DIASTOLIC BLOOD PRESSURE: 63 MMHG | WEIGHT: 229.4 LBS

## 2018-11-20 LAB
ABSOLUTE EOS #: 0.23 K/UL (ref 0–0.44)
ABSOLUTE IMMATURE GRANULOCYTE: <0.03 K/UL (ref 0–0.3)
ABSOLUTE LYMPH #: 1.24 K/UL (ref 1.1–3.7)
ABSOLUTE MONO #: 0.5 K/UL (ref 0.1–1.2)
ANION GAP SERPL CALCULATED.3IONS-SCNC: 8 MMOL/L (ref 9–17)
BASOPHILS # BLD: 1 % (ref 0–2)
BASOPHILS ABSOLUTE: <0.03 K/UL (ref 0–0.2)
BUN BLDV-MCNC: 4 MG/DL (ref 8–23)
BUN/CREAT BLD: ABNORMAL (ref 9–20)
CALCIUM SERPL-MCNC: 8.5 MG/DL (ref 8.6–10.4)
CHLORIDE BLD-SCNC: 108 MMOL/L (ref 98–107)
CO2: 25 MMOL/L (ref 20–31)
CREAT SERPL-MCNC: 0.51 MG/DL (ref 0.7–1.2)
DIFFERENTIAL TYPE: ABNORMAL
EOSINOPHILS RELATIVE PERCENT: 6 % (ref 1–4)
GFR AFRICAN AMERICAN: >60 ML/MIN
GFR NON-AFRICAN AMERICAN: >60 ML/MIN
GFR SERPL CREATININE-BSD FRML MDRD: ABNORMAL ML/MIN/{1.73_M2}
GFR SERPL CREATININE-BSD FRML MDRD: ABNORMAL ML/MIN/{1.73_M2}
GLUCOSE BLD-MCNC: 102 MG/DL (ref 70–99)
HCT VFR BLD CALC: 31.8 % (ref 40.7–50.3)
HEMOGLOBIN: 9.8 G/DL (ref 13–17)
IMMATURE GRANULOCYTES: 0 %
LYMPHOCYTES # BLD: 33 % (ref 24–43)
MCH RBC QN AUTO: 29.3 PG (ref 25.2–33.5)
MCHC RBC AUTO-ENTMCNC: 30.8 G/DL (ref 28.4–34.8)
MCV RBC AUTO: 94.9 FL (ref 82.6–102.9)
MONOCYTES # BLD: 13 % (ref 3–12)
NRBC AUTOMATED: 0 PER 100 WBC
PARTIAL THROMBOPLASTIN TIME: 43.2 SEC (ref 20.5–30.5)
PARTIAL THROMBOPLASTIN TIME: 53.8 SEC (ref 20.5–30.5)
PDW BLD-RTO: 15.1 % (ref 11.8–14.4)
PLATELET # BLD: 164 K/UL (ref 138–453)
PLATELET ESTIMATE: ABNORMAL
PMV BLD AUTO: 9.3 FL (ref 8.1–13.5)
POTASSIUM SERPL-SCNC: 4 MMOL/L (ref 3.7–5.3)
RBC # BLD: 3.35 M/UL (ref 4.21–5.77)
RBC # BLD: ABNORMAL 10*6/UL
SEG NEUTROPHILS: 47 % (ref 36–65)
SEGMENTED NEUTROPHILS ABSOLUTE COUNT: 1.75 K/UL (ref 1.5–8.1)
SODIUM BLD-SCNC: 141 MMOL/L (ref 135–144)
WBC # BLD: 3.8 K/UL (ref 3.5–11.3)
WBC # BLD: ABNORMAL 10*3/UL

## 2018-11-20 PROCEDURE — 6360000002 HC RX W HCPCS: Performed by: STUDENT IN AN ORGANIZED HEALTH CARE EDUCATION/TRAINING PROGRAM

## 2018-11-20 PROCEDURE — 85730 THROMBOPLASTIN TIME PARTIAL: CPT

## 2018-11-20 PROCEDURE — 97116 GAIT TRAINING THERAPY: CPT

## 2018-11-20 PROCEDURE — 6370000000 HC RX 637 (ALT 250 FOR IP): Performed by: STUDENT IN AN ORGANIZED HEALTH CARE EDUCATION/TRAINING PROGRAM

## 2018-11-20 PROCEDURE — 2580000003 HC RX 258: Performed by: STUDENT IN AN ORGANIZED HEALTH CARE EDUCATION/TRAINING PROGRAM

## 2018-11-20 PROCEDURE — 85025 COMPLETE CBC W/AUTO DIFF WBC: CPT

## 2018-11-20 PROCEDURE — 36415 COLL VENOUS BLD VENIPUNCTURE: CPT

## 2018-11-20 PROCEDURE — 80048 BASIC METABOLIC PNL TOTAL CA: CPT

## 2018-11-20 PROCEDURE — 97535 SELF CARE MNGMENT TRAINING: CPT

## 2018-11-20 PROCEDURE — 99239 HOSP IP/OBS DSCHRG MGMT >30: CPT | Performed by: INTERNAL MEDICINE

## 2018-11-20 RX ORDER — METOPROLOL SUCCINATE 25 MG/1
25 TABLET, EXTENDED RELEASE ORAL DAILY
Qty: 30 TABLET | Refills: 3 | Status: SHIPPED | OUTPATIENT
Start: 2018-11-20 | End: 2019-03-05 | Stop reason: SDUPTHER

## 2018-11-20 RX ORDER — NITROFURANTOIN 25; 75 MG/1; MG/1
100 CAPSULE ORAL EVERY 12 HOURS SCHEDULED
Qty: 14 CAPSULE | Refills: 0 | Status: SHIPPED | OUTPATIENT
Start: 2018-11-20 | End: 2018-11-27

## 2018-11-20 RX ADMIN — SODIUM CHLORIDE: 9 INJECTION, SOLUTION INTRAVENOUS at 08:48

## 2018-11-20 RX ADMIN — THERA TABS 1 TABLET: TAB at 08:37

## 2018-11-20 RX ADMIN — HEPARIN SODIUM 2000 UNITS: 1000 INJECTION, SOLUTION INTRAVENOUS; SUBCUTANEOUS at 02:41

## 2018-11-20 RX ADMIN — HEPARIN SODIUM AND DEXTROSE 11.6 UNITS/KG/HR: 10000; 5 INJECTION INTRAVENOUS at 02:43

## 2018-11-20 RX ADMIN — MAGNESIUM HYDROXIDE 30 ML: 400 SUSPENSION ORAL at 08:51

## 2018-11-20 RX ADMIN — PANTOPRAZOLE SODIUM 40 MG: 40 TABLET, DELAYED RELEASE ORAL at 06:41

## 2018-11-20 RX ADMIN — FLUTICASONE PROPIONATE 1 SPRAY: 50 SPRAY, METERED NASAL at 08:37

## 2018-11-20 RX ADMIN — NITROFURANTOIN MONOHYDRATE/MACROCRYSTALLINE 100 MG: 25; 75 CAPSULE ORAL at 08:37

## 2018-11-20 NOTE — PLAN OF CARE
Problem: Respiratory  Intervention: Continuous positive airway pressure (CPAP)  PATIENT REFUSES TO WEAR BIPAP     [x] Risks and benefits explained to patient   [x] Patient refuses to wear Bipap stating not like mine at home  [x] Patient verbalizes understanding of information presented.

## 2018-11-20 NOTE — PROGRESS NOTES
Physical Therapy  Facility/Department: [facility]  Daily Treatment Note  NAME: Fanny West  : 1945  MRN: [id number]    Date of Service: 2018    Discharge Recommendations:  Home with assist PRN   PT Equipment Recommendations  Equipment Needed: No    Patient Diagnosis(es): The primary encounter diagnosis was Non-intractable vomiting with nausea, unspecified vomiting type. A diagnosis of Dysuria was also pertinent to this visit. has a past medical history of Asthma; Atrial fibrillation ([address] GERD (gastroesophageal reflux disease); Hyperlipidemia; Hypertension; Obesity; Osteoarthritis; and Unspecified sleep apnea. has a past surgical history that includes Finger amputation (); Gastric bypass surgery (); Carpal tunnel release; Colonoscopy; Rotator cuff repair; joint replacement ( & ); Hemorrhoid surgery; pr egd transoral biopsy single/multiple (N/A, 2018); Upper gastrointestinal endoscopy (2018); Upper gastrointestinal endoscopy (N/A, 2018); Upper gastrointestinal endoscopy (N/A, 2018); Upper gastrointestinal endoscopy (2018); pr egd flexible foreign body removal (N/A, 2018); Upper gastrointestinal endoscopy (2018); Upper gastrointestinal endoscopy (N/A, 10/1/2018); and Upper gastrointestinal endoscopy (10/1/2018). Restrictions  Restrictions/Precautions  Restrictions/Precautions: Fall Risk, General Precautions  Required Braces or Orthoses?: Yes  Required Braces or Orthoses  Right Lower Extremity Brace: Ankle Foot Orthotics (R foot drop)  Position Activity Restriction  Other position/activity restrictions: Up with assist  Subjective   General  Response To Previous Treatment: Patient with no complaints from previous session.   Family / Caregiver Present: No  Subjective  Subjective: RN and pt agreed to PT, pt awake in chair upon arrival   Pain Screening  Patient Currently in Pain: Denies  Vital Signs  Patient Currently in Pain: Denies [address], PTA

## 2018-11-20 NOTE — PROGRESS NOTES
Tolerance  Activity Tolerance: Patient Tolerated treatment well  Safety Devices  Safety Devices in place: Yes  Type of devices: Gait belt;Call light within reach; Left in chair;Nurse notified  Restraints  Initially in place: No          Plan   Plan  Times per week: 3-4x/wk     Goals  Short term goals  Time Frame for Short term goals: pt will, by discharge  Short term goal 1: dem mod I during functional transfers/functional mobility with LRD  Short term goal 2: dem understanding and initiate use of 2-3 energy conservation tech during functional tasks  Short term goal 3: dem 20+ minutes activities tolerance in order to complete functional tasks  Short term goal 4: dem 10+ minutes dynamic standing tolerance with LRD and mod I in order to complete ADLs  Short term goal 5: complete LB ADLs while seated with SBA and AE, as needed       Therapy Time   Individual Concurrent Group Co-treatment   Time In 1007         Time Out 1041         Minutes 75 Chuy Daly, OT/S

## 2018-11-20 NOTE — PROGRESS NOTES
Nutrition Assessment    Type and Reason for Visit: Reassess    Nutrition Recommendations:   - Continue Full liquid diet and ONS  - Modify ONS order to provide regular Ensure Enlive BID  - Continue to monitor tolerance to diet and adequacy of po intakes    Nutrition Assessment: Pt remains at moderate nutritional risk d/t decreased appetite and inadequate oral intake at mealtimes. Pt not drinking ONS (Ensure Clear) and consumed less than 25% of breakfast (cream of wheat and milk). Malnutrition Assessment:  · Malnutrition Status: At risk for malnutrition  · Context: Chronic illness  · Findings of the 6 clinical characteristics of malnutrition (Minimum of 2 out of 6 clinical characteristics is required to make the diagnosis of moderate or severe Protein Calorie Malnutrition based on AND/ASPEN Guidelines):  1. Energy Intake-Less than 75% of estimated energy requirement for greater than 7 days    2. Weight Loss-5% loss or greater (some weight change may be fluid related), in 1 month  3. Fat Loss-No significant subcutaneous fat loss  4. Muscle Loss-No significant muscle mass loss  5. Fluid Accumulation-Moderate to severe fluid accumulation, Extremities  6.  Strength-Not measured    Nutrition Risk Level: Moderate    Nutrient Needs:  · Estimated Daily Total Kcal: 1700 kcal/day  · Estimated Daily Protein (g):  g pro/day     Nutrition Diagnosis:   · Problem: Inadequate oral intake  · Etiology: related to Alteration in GI function     Signs and symptoms:  as evidenced by Intake 0-25%, Intake 25-50%    Objective Information:  · Current Nutrition Therapies:  · Oral Diet Orders: Full Liquid   · Oral Diet intake: 1-25% (breakfast 11/20)  · Oral Nutrition Supplement (ONS) Orders: Clear Liquid Oral Supplement  · Anthropometric Measures:  · Ht: 5' 7.5\" (171.5 cm)   · Current Body Wt: 216 lb 11.4 oz (98.3 kg)  · Usual Body Wt:  (approx 233 lb per pt.  Noted weight of 231 lb on 10/11/18)  · % Weight Change:  6% over 1

## 2018-11-20 NOTE — PROGRESS NOTES
IM RESIDENT PROGRESS NOTE        Patient:  Natalia Rodríguez  YOB: 1945    MRN: [id number]     Acct: [de-identified]     Admit date: 11/16/2018    Pt seen and Chart reviewed. Chief Complaint   Patient presents with    Emesis     vomiting and dehydration for the past 5 days    Dehydration       Subjective:   Patient seen and examined  Denies nausea and vomiting, but complains of mild regurgitation with Jell-O  Continues to have mild burning with urination along with pressure  Denies increased frequency or difficulty maintaining stream          Diet:  Dietary Nutrition Supplements: Clear Liquid Oral Supplement  DIET FULL LIQUID;        Medications:Current Inpatient    Scheduled Meds:   nitrofurantoin (macrocrystal-monohydrate)  100 mg Oral 2 times per day    pantoprazole  40 mg Oral QAM AC    ciclopirox   Topical Nightly    fluticasone  1 spray Each Nare Daily    multivitamin  1 tablet Oral Daily    sodium chloride flush  10 mL Intravenous 2 times per day     Continuous Infusions:   heparin (porcine) 11.6 Units/kg/hr (11/20/18 0243)    sodium chloride 75 mL/hr at 11/20/18 0848     PRN Meds:melatonin, sodium chloride flush, magnesium hydroxide, ondansetron, heparin (porcine), heparin (porcine), metoprolol, albuterol sulfate HFA, albuterol    Objective:    Physical Exam:  Vitals: /63   Pulse 67   Temp 97.5 °F (36.4 °C) (Oral)   Resp 18   Ht 5' 7.5\" (1.715 m)   Wt 229 lb 6.4 oz (104.1 kg)   SpO2 100%   BMI 35.40 kg/m²   24 hour intake/output:    Intake/Output Summary (Last 24 hours) at 11/20/18 1105  Last data filed at 11/20/18 0845   Gross per 24 hour   Intake          1923.95 ml   Output              600 ml   Net          1323.95 ml     Last 3 weights:   Wt Readings from Last 3 Encounters:   11/19/18 229 lb 6.4 oz (104.1 kg)   11/07/18 235 lb (106.6 kg)   10/31/18 228 lb 8 oz (103.6 kg)       Physical Examination:

## 2018-11-20 NOTE — PROGRESS NOTES
Post GASTROENTEROLOGY    Gastroenterology Daily Progress Note      Patient:   Priscilla Beach   :    1945   Facility:   [facility]   Date:     2018  Consultant:   Abena Clark CNP      Subjective:     68 y.o. male admitted 2018 with Nausea & vomiting [R11.2] and seen for EGD with dilation     Patient seen and examined. Reports small amount of emesis with phullo this AM. No hematemesis. No abdominal pain. No further episodes     Objective     Scheduled Meds:   nitrofurantoin (macrocrystal-monohydrate)  100 mg Oral 2 times per day    pantoprazole  40 mg Oral QAM AC    ciclopirox   Topical Nightly    fluticasone  1 spray Each Nare Daily    multivitamin  1 tablet Oral Daily    sodium chloride flush  10 mL Intravenous 2 times per day       Vital Signs:  /63   Pulse 67   Temp 97.5 °F (36.4 °C) (Oral)   Resp 18   Ht 5' 7.5\" (1.715 m)   Wt 229 lb 6.4 oz (104.1 kg)   SpO2 100%   BMI 35.40 kg/m²      Physical Exam:   General appearance: Alert & oriented, NAD  Lungs: CTA bilaterally    Heart: S1S2 RRR  Abdomen: Soft, Nontender, Not distended, BS WNL  Skin: No jaundice, No clubbing, No cyanosis    Lab and Imaging Review     CBC  Recent Labs      18   0555  18   0552  18   0541   WBC  4.5  3.7  3.8   HGB  10.0*  9.2*  9.8*   HCT  32.5*  29.8*  31.8*   MCV  95.9  94.9  94.9   MCH  29.5  29.3  29.3   MCHC  30.8  30.9  30.8   PLT  189  160  164       BMP  Recent Labs      18   0555  18   0552  18   0541   NA  137  140  141   K  3.8  3.5*  4.0   CL  104  106  108*   CO2  23  23  25   BUN  8  5*  4*   CREATININE  0.57*  0.50*  0.51*   GLUCOSE  100*  100*  102*   CALCIUM  8.4*  8.3*  8.5*       ENDOSCOPY  2018    PREOPERATIVE DIAGNOSIS: gastric obstruction.      PROCEDURES:   1) Transoral Upper Endoscopy, balloon dilation.  Removal of foreign body      POSTOPERATIVE DIAGNOSIS:   Surgical banding anatomy with mild AM

## 2018-11-21 ENCOUNTER — CARE COORDINATION (OUTPATIENT)
Dept: CASE MANAGEMENT | Age: 73
End: 2018-11-21

## 2018-11-21 DIAGNOSIS — R11.2 NON-INTRACTABLE VOMITING WITH NAUSEA, UNSPECIFIED VOMITING TYPE: Primary | ICD-10-CM

## 2018-11-21 PROCEDURE — 1111F DSCHRG MED/CURRENT MED MERGE: CPT | Performed by: PHYSICIAN ASSISTANT

## 2018-11-26 ENCOUNTER — HOSPITAL ENCOUNTER (OUTPATIENT)
Dept: PHYSICAL THERAPY | Facility: CLINIC | Age: 73
Setting detail: THERAPIES SERIES
Discharge: HOME | End: 2018-11-26
Payer: COMMERCIAL

## 2018-11-26 PROCEDURE — 97110 THERAPEUTIC EXERCISES: CPT

## 2018-11-27 ENCOUNTER — OFFICE VISIT (OUTPATIENT)
Dept: PRIMARY CARE CLINIC | Age: 73
End: 2018-11-27
Payer: COMMERCIAL

## 2018-11-27 VITALS
SYSTOLIC BLOOD PRESSURE: 100 MMHG | HEART RATE: 67 BPM | BODY MASS INDEX: 35.18 KG/M2 | WEIGHT: 228 LBS | DIASTOLIC BLOOD PRESSURE: 51 MMHG | OXYGEN SATURATION: 98 %

## 2018-11-27 DIAGNOSIS — K31.89 ANASTOMOTIC STRICTURE OF STOMACH: ICD-10-CM

## 2018-11-27 DIAGNOSIS — K31.1 GASTRIC OUT LET OBSTRUCTION: Primary | ICD-10-CM

## 2018-11-27 DIAGNOSIS — K92.9 ANASTOMOTIC STRICTURE OF STOMACH: ICD-10-CM

## 2018-11-27 PROCEDURE — G8427 DOCREV CUR MEDS BY ELIG CLIN: HCPCS | Performed by: INTERNAL MEDICINE

## 2018-11-27 PROCEDURE — G8417 CALC BMI ABV UP PARAM F/U: HCPCS | Performed by: INTERNAL MEDICINE

## 2018-11-27 PROCEDURE — 1101F PT FALLS ASSESS-DOCD LE1/YR: CPT | Performed by: INTERNAL MEDICINE

## 2018-11-27 PROCEDURE — 99214 OFFICE O/P EST MOD 30 MIN: CPT | Performed by: INTERNAL MEDICINE

## 2018-11-27 PROCEDURE — 4040F PNEUMOC VAC/ADMIN/RCVD: CPT | Performed by: INTERNAL MEDICINE

## 2018-11-27 PROCEDURE — 1036F TOBACCO NON-USER: CPT | Performed by: INTERNAL MEDICINE

## 2018-11-27 PROCEDURE — 3017F COLORECTAL CA SCREEN DOC REV: CPT | Performed by: INTERNAL MEDICINE

## 2018-11-27 PROCEDURE — G8484 FLU IMMUNIZE NO ADMIN: HCPCS | Performed by: INTERNAL MEDICINE

## 2018-11-27 PROCEDURE — 1123F ACP DISCUSS/DSCN MKR DOCD: CPT | Performed by: INTERNAL MEDICINE

## 2018-11-27 PROCEDURE — 1111F DSCHRG MED/CURRENT MED MERGE: CPT | Performed by: INTERNAL MEDICINE

## 2018-11-27 ASSESSMENT — ENCOUNTER SYMPTOMS
NAUSEA: 1
RESPIRATORY NEGATIVE: 1
ABDOMINAL PAIN: 1
ALLERGIC/IMMUNOLOGIC NEGATIVE: 1
EYES NEGATIVE: 1

## 2018-11-27 NOTE — PROGRESS NOTES
TIMES A DAY. 270 capsule 0    albuterol (PROVENTIL) 2 MG tablet Use as needed 1 tablet 0    Multiple Vitamin (MULTIVITAMIN) tablet Take 1 tablet by mouth daily 30 tablet 2    cetirizine (ZYRTEC) 10 MG tablet TAKE 1 TABLET BY MOUTH DAILY 30 tablet 1    metoclopramide (REGLAN) 5 MG/5ML solution Take 5 mLs by mouth 4 times daily (before meals and nightly) (Patient taking differently: Take 5 mg by mouth 4 times daily (before meals and nightly) ) 750 mL 3    sucralfate (CARAFATE) 1 GM tablet Take 1 tablet by mouth 4 times daily 120 tablet 3    Incontinence Supply Disposable (INCONTINENCE BRIEF LARGE) MISC To use as directed. Use 3x a day 90 each 3    polyethylene glycol (GLYCOLAX) powder Take 17 g by mouth daily 527 g 3    rivaroxaban (XARELTO) 20 MG TABS tablet Take 1 tablet by mouth daily (with breakfast) 30 tablet 2    ondansetron (ZOFRAN-ODT) 4 MG disintegrating tablet Take 4 mg by mouth      ipratropium-albuterol (DUONEB) 0.5-2.5 (3) MG/3ML SOLN nebulizer solution Inhale 1 vial into the lungs every 4 hours      Armodafinil 200 MG TABS Take 1 tablet by mouth every morning. Deepika Presto ciclopirox (PENLAC) 8 % solution Apply topically nightly to nails 6 mL 11    albuterol (PROVENTIL HFA;VENTOLIN HFA) 108 (90 BASE) MCG/ACT inhaler Inhale 2 puffs into the lungs every 6 hours as needed for Wheezing.  tiZANidine (ZANAFLEX) 4 MG tablet Take 1 tablet by mouth nightly as needed (Myalgia) 30 tablet 2    SYMBICORT 160-4.5 MCG/ACT AERO        No current facility-administered medications for this visit.       Allergies   Allergen Reactions    Darvocet [Propoxyphene N-Acetaminophen] Hives    Other Hives    Oxycodone-Acetaminophen        Health Maintenance   Topic Date Due    Shingles Vaccine (1 of 2 - 2 Dose Series) 04/03/2019 (Originally 10/31/1996)    DTaP/Tdap/Td vaccine (1 - Tdap) 04/10/2019 (Originally 10/31/1965)    Flu vaccine (1) 09/09/2019 (Originally 9/1/2018)    Pneumococcal low/med risk (2 of 2 -

## 2018-11-28 ENCOUNTER — CARE COORDINATION (OUTPATIENT)
Dept: CASE MANAGEMENT | Age: 73
End: 2018-11-28

## 2018-11-29 ENCOUNTER — OFFICE VISIT (OUTPATIENT)
Dept: BARIATRICS/WEIGHT MGMT | Age: 73
End: 2018-11-29
Payer: COMMERCIAL

## 2018-11-29 ENCOUNTER — HOSPITAL ENCOUNTER (OUTPATIENT)
Dept: PHYSICAL THERAPY | Facility: CLINIC | Age: 73
Setting detail: THERAPIES SERIES
Discharge: HOME | End: 2018-11-29
Payer: COMMERCIAL

## 2018-11-29 VITALS
HEART RATE: 70 BPM | HEIGHT: 68 IN | WEIGHT: 235 LBS | SYSTOLIC BLOOD PRESSURE: 116 MMHG | RESPIRATION RATE: 20 BRPM | DIASTOLIC BLOOD PRESSURE: 66 MMHG | BODY MASS INDEX: 35.61 KG/M2

## 2018-11-29 DIAGNOSIS — K92.9 GASTRIC ANASTOMOTIC STRICTURE: Primary | ICD-10-CM

## 2018-11-29 DIAGNOSIS — K21.9 GASTROESOPHAGEAL REFLUX DISEASE WITHOUT ESOPHAGITIS: ICD-10-CM

## 2018-11-29 DIAGNOSIS — K31.89 GASTRIC ANASTOMOTIC STRICTURE: Primary | ICD-10-CM

## 2018-11-29 PROCEDURE — 97110 THERAPEUTIC EXERCISES: CPT

## 2018-11-29 PROCEDURE — G8979 MOBILITY GOAL STATUS: HCPCS

## 2018-11-29 PROCEDURE — G8978 MOBILITY CURRENT STATUS: HCPCS

## 2018-11-29 PROCEDURE — 1111F DSCHRG MED/CURRENT MED MERGE: CPT | Performed by: SURGERY

## 2018-11-29 PROCEDURE — G8417 CALC BMI ABV UP PARAM F/U: HCPCS | Performed by: SURGERY

## 2018-11-29 PROCEDURE — 99213 OFFICE O/P EST LOW 20 MIN: CPT | Performed by: SURGERY

## 2018-11-29 PROCEDURE — 1101F PT FALLS ASSESS-DOCD LE1/YR: CPT | Performed by: SURGERY

## 2018-11-29 PROCEDURE — 3017F COLORECTAL CA SCREEN DOC REV: CPT | Performed by: SURGERY

## 2018-11-29 PROCEDURE — G8427 DOCREV CUR MEDS BY ELIG CLIN: HCPCS | Performed by: SURGERY

## 2018-11-29 PROCEDURE — 1036F TOBACCO NON-USER: CPT | Performed by: SURGERY

## 2018-11-29 PROCEDURE — 4040F PNEUMOC VAC/ADMIN/RCVD: CPT | Performed by: SURGERY

## 2018-11-29 PROCEDURE — G8484 FLU IMMUNIZE NO ADMIN: HCPCS | Performed by: SURGERY

## 2018-11-29 PROCEDURE — 1123F ACP DISCUSS/DSCN MKR DOCD: CPT | Performed by: SURGERY

## 2018-11-29 NOTE — PROGRESS NOTES
signing above or cosigning this note, I have reviewed this plan of care and certify a need for medically necessary rehabilitation services.

## 2018-11-30 ENCOUNTER — CARE COORDINATION (OUTPATIENT)
Dept: CASE MANAGEMENT | Age: 73
End: 2018-11-30

## 2018-12-02 NOTE — DISCHARGE SUMMARY
tablet, R-2Normal      ondansetron (ZOFRAN-ODT) 4 MG disintegrating tablet Take 4 mg by mouthHistorical Med      ipratropium-albuterol (DUONEB) 0.5-2.5 (3) MG/3ML SOLN nebulizer solution Inhale 1 vial into the lungs every 4 hoursHistorical Med      Armodafinil 200 MG TABS Take 1 tablet by mouth every morning. Katt Choe Historical Med      ciclopirox (PENLAC) 8 % solution Apply topically nightly to nails, Disp-6 mL, R-11, Normal      SYMBICORT 160-4.5 MCG/ACT AERO DAWHistorical Med      albuterol (PROVENTIL HFA;VENTOLIN HFA) 108 (90 BASE) MCG/ACT inhaler Inhale 2 puffs into the lungs every 6 hours as needed for Wheezing.          STOP taking these medications       sulfamethoxazole-trimethoprim (BACTRIM DS) 800-160 MG per tablet Comments:   Reason for Stopping:         spironolactone (ALDACTONE) 25 MG tablet Comments:   Reason for Stopping:         bumetanide (BUMEX) 1 MG tablet Comments:   Reason for Stopping:             Activity: activity as tolerated    Diet: full liquid    Disposition: home    Follow-up:  in the next few weeks with Jaspal Weeks PA-C,        Karen Coles MD  PGY-1, Internal Medicine Resident  [place], [place]  12/2/2018, 5:55 PM

## 2018-12-02 NOTE — DISCHARGE INSTR - COC
Continuity of Care Form    Patient Name: Makenzie Pineda   :  1945  MRN:  [id number]    Admit date:  2018  Discharge date:  ***    Code Status Order: Prior   Advance Directives:   [address] [facility] Documentation     Date/Time Healthcare Directive Type of Healthcare Directive Copy in [address] Agent's Name Healthcare Agent's Phone Number    18 [phone]  No, patient does not have an advance directive for healthcare treatment -- -- -- -- --          Admitting Physician:  Racquel Ray MD  PCP: Omar Boo PA-C    Discharging Nurse: [facility] Unit/Room#: 8280/3490-21  Discharging Unit Phone Number: ***    Emergency Contact:   Extended Emergency Contact Information  Primary Emergency Contact: Marti  Address: [address] [place] [address] Phone: [phone]  Mobile Phone: [phone]  Relation: Spouse  Secondary Emergency Contact: Ryan Rousseau  Address: [address] Phone: [phone]  Mobile Phone: [phone]  Relation: Child    Past Surgical History:  Past Surgical History:   Procedure Laterality Date    CARPAL TUNNEL RELEASE      bilateral    COLONOSCOPY      FINGER AMPUTATION  1966    first knuckle right index finger    GASTRIC BYPASS SURGERY  1985    vertical banded gastroplasty    [address] REPLACEMENT  2004 & 2005    bilateral knees    DE EGD [address] N/A 2018    EGD FOREIGN BODY REMOVAL performed by Clyde Mercado MD at [address] EGD TRANSORAL BIOPSY SINGLE/MULTIPLE N/A 2018    EGD BIOPSY performed by Iona Hay DO at [address]      left shoulder    UPPER GASTROINTESTINAL ENDOSCOPY  2018    removal of food bolus    UPPER GASTROINTESTINAL ENDOSCOPY N/A 2018    EGD FOREIGN BODY REMOVAL performed by Iona Hay DO at [address] [facility] INTERVENTION:[id number]}  Weight Bearing Status/Restrictions: 50Doug Sandoval CC Weight Bearin}  Other Medical Equipment (for information only, NOT a DME order):  {EQUIPMENT:751651229}  Other Treatments: ***    Patient's personal belongings (please select all that are sent with patient):  {CHP DME Belongings:412964851}    RN SIGNATURE:  {Esignature:[id number]}    CASE MANAGEMENT/SOCIAL WORK SECTION    Inpatient Status Date: ***    Readmission Risk Assessment Score:  Readmission Risk              Risk of Unplanned Readmission:        0           Discharging to Facility/ Agency   · Name:   · Address:  · Phone:  · Fax:    Dialysis Facility (if applicable)   · Name:  · Address:  · Dialysis Schedule:  · Phone:  · Fax:    / signature: {Esignature:779487327}    PHYSICIAN SECTION    Prognosis: {Prognosis:9395644860}    Condition at Discharge: 50Doug Sandoval Patient Condition:015610940}    Rehab Potential (if transferring to Rehab): {Prognosis:9317112639}    Recommended Labs or Other Treatments After Discharge: ***    Physician Certification: I certify the above information and transfer of Aneudy Verma  is necessary for the continuing treatment of the diagnosis listed and that he requires {Admit to Appropriate Level of Care:32217} for {GREATER/LESS:725444490} 30 days.      Update Admission H&P: {CHP DME Changes in HRAJN:068429394}    PHYSICIAN SIGNATURE:  {Esignature:267632610}

## 2018-12-03 ENCOUNTER — HOSPITAL ENCOUNTER (OUTPATIENT)
Dept: PHYSICAL THERAPY | Facility: CLINIC | Age: 73
Setting detail: THERAPIES SERIES
Discharge: HOME | End: 2018-12-03
Payer: COMMERCIAL

## 2018-12-03 PROCEDURE — 97110 THERAPEUTIC EXERCISES: CPT

## 2018-12-03 PROCEDURE — G8980 MOBILITY D/C STATUS: HCPCS

## 2018-12-03 PROCEDURE — G8978 MOBILITY CURRENT STATUS: HCPCS

## 2018-12-03 NOTE — FLOWSHEET NOTE
exercise with no more than 3 breaks to increase endurance to activity. MET 7/30  2. ? Function: demonstrate sitting upright in chair with scapular retraction. 3.     4. Independent with Home Exercise Programs  Demonstrate Knowledge of fall prevention 7-16-18 met, issued and educated on fall prevention.     LTG: (to be met in 10 treatments)  1. Improve LEFI score, by 1 level for a (usual housework) - met 10/15t,  (walking between rooms) - unmetand J (in and out of car - met, 10/15)  2. Decrease pain from 4/10 to 2/10 maximum, 10/15 partially met, 3/10 for knees, all else < 2/10  3. Up and down 5 steps safely, 1 rail to simulate getting in and out of home. ONGOING  4. Improve TUG score by 6 seconds, met 10-15-18    ADDITIONAL LTG\"s continue to address unmet goals above, focus PT on LE balance, endurance, ADL functioning and gait stability    Ability to walk 500 feet with walker, 11/29, unmet 300ft with walker, slightly labored  Up and down 7 steps, 1 rail to simulate getting in and out of home    Pt. Education:  [] Yes  [x] No  [] Reviewed Prior HEP/Ed  Method of Education: [] Verbal  [] Demo  [] Written  Comprehension of Education:  [] Verbalizes understanding. [] Demonstrates understanding. [] Needs review. [] Demonstrates/verbalizes HEP/Ed previously given. Plan: [x] Continue per plan of care.    [] Other:       Time In: 8:53 am           Time Out:   9: 40 pm    Electronically signed by:  Sushma Degroot PT

## 2018-12-05 ENCOUNTER — CARE COORDINATION (OUTPATIENT)
Dept: CASE MANAGEMENT | Age: 73
End: 2018-12-05

## 2018-12-05 NOTE — CARE COORDINATION
Attempt to reach patient for Care Transitions. [facility] requesting return call. Contact information provided.

## 2018-12-07 ENCOUNTER — CARE COORDINATION (OUTPATIENT)
Dept: CARE COORDINATION | Age: 73
End: 2018-12-07

## 2018-12-07 ENCOUNTER — HOSPITAL ENCOUNTER (OUTPATIENT)
Age: 73
Discharge: HOME | End: 2018-12-07
Payer: COMMERCIAL

## 2018-12-07 DIAGNOSIS — K31.1 GASTRIC OUT LET OBSTRUCTION: ICD-10-CM

## 2018-12-07 DIAGNOSIS — K31.89 ANASTOMOTIC STRICTURE OF STOMACH: ICD-10-CM

## 2018-12-07 DIAGNOSIS — K92.9 ANASTOMOTIC STRICTURE OF STOMACH: ICD-10-CM

## 2018-12-07 LAB
ANION GAP SERPL CALCULATED.3IONS-SCNC: 13 MMOL/L (ref 9–17)
CHLORIDE BLD-SCNC: 99 MMOL/L (ref 98–107)
CO2: 28 MMOL/L (ref 20–31)
POTASSIUM SERPL-SCNC: 4.4 MMOL/L (ref 3.7–5.3)
SODIUM BLD-SCNC: 140 MMOL/L (ref 135–144)

## 2018-12-07 PROCEDURE — 36415 COLL VENOUS BLD VENIPUNCTURE: CPT

## 2018-12-07 PROCEDURE — 80051 ELECTROLYTE PANEL: CPT

## 2018-12-10 ENCOUNTER — HOSPITAL ENCOUNTER (OUTPATIENT)
Dept: PHYSICAL THERAPY | Facility: CLINIC | Age: 73
Setting detail: THERAPIES SERIES
Discharge: HOME | End: 2018-12-10
Payer: COMMERCIAL

## 2018-12-10 NOTE — FLOWSHEET NOTE
[] [address]  [address]    [phone]  F: [phone] [] [address] [address]   [address]  P: [phone]  F: [phone] [] [place]  [address] [facility]  P: [phone]  F: [phone] [] [address] [facility]   [address]   [place]: [phone]  F: [phone] [] [place] [address] [facility] [address]  [place]: [phone]   F: [phone]      Physical Therapy Cancel/No Show note    Date: 12/10/2018  Patient: Theresa Francis  : 1945  MRN: [id number]    Cancels/No Shows to date:     For today's appointment patient:  [x]  Cancelled - office cancel. Does not count against patient. []  Rescheduled appointment  []  No-show     Reason given by patient:  []  Patient ill  []  Conflicting appointment  []  No transportation    []  Conflict with work  []  No reason given  []  Weather related  []  Other:      Comments:  Pt arrives to therapy this date however insurance denied additional visits. Pt was provided with exercises log to begin completing on his own. Pt will be discharged at this time.      []  Next appointment was confirmed    Electronically signed by: Evi Perry PTA

## 2018-12-11 NOTE — DISCHARGE SUMMARY
exercise with no more than 3 breaks to increase endurance to activity. MET 7/30  2. ? Function: demonstrate sitting upright in chair with scapular retraction. 3.     4. Independent with Home Exercise Programs  Demonstrate Knowledge of fall prevention 7-16-18 met, issued and educated on fall prevention.     LTG: (to be met in 10 treatments)  1. Improve LEFI score, by 1 level for a (usual housework) - met 10/15t,  (walking between rooms) - unmetand J (in and out of car - met, 10/15)  2. Decrease pain from 4/10 to 2/10 maximum, 10/15 partially met, 3/10 for knees, all else < 2/10  3. Up and down 5 steps safely, 1 rail to simulate getting in and out of home. ONGOING  4. Improve TUG score by 6 seconds, met 10-15-18     ADDITIONAL LTG\"s continue to address unmet goals above, focus PT on LE balance, endurance, ADL functioning and gait stability     Ability to walk 500 feet with walker, 11/29, unmet 300ft with walker, slightly labored  Up and down 7 steps, 1 rail to simulate getting in and out of home    Treatment to Date:  [x] Therapeutic Exercise    [] Modalities:  [] Therapeutic Activity     [] Ultrasound  [] Electrical Stimulation  [x] Gait Training     [] Massage       [] Lumbar/Cervical Traction  [] Neuromuscular Re-education [] Cold/hotpack [] Iontophoresis: 4 mg/mL  [x] Instruction in Home Exercise Program                     Dexamethasone Sodium  [] Manual Therapy             Phosphate 40-80 mAmin  [] Aquatic Therapy                   [] Vasocompression/    [] Other:             Game Ready      G-CODE (set at eval on visit #1) (reset at visit #10) (reset at visit #20) (reset at visit # 29) (set at visit #30 for discharge)     Functional Limitation: mobility  Functional Assessment Used: LEFI ( 66% overall limitations, 82% at eval)), TUG ( 22 sec today, 31 prior)  Current Status Modifier: Cj  Goal Status Modifier: Ci  Discharge Status Modifier: Cj    Discharge Status:         [x] Pt received maximum benefit.  No

## 2018-12-12 ENCOUNTER — OFFICE VISIT (OUTPATIENT)
Dept: PRIMARY CARE CLINIC | Age: 73
End: 2018-12-12
Payer: COMMERCIAL

## 2018-12-12 VITALS
SYSTOLIC BLOOD PRESSURE: 114 MMHG | HEART RATE: 69 BPM | OXYGEN SATURATION: 98 % | WEIGHT: 239 LBS | DIASTOLIC BLOOD PRESSURE: 71 MMHG | BODY MASS INDEX: 36.86 KG/M2

## 2018-12-12 DIAGNOSIS — I48.20 CHRONIC ATRIAL FIBRILLATION (HCC): ICD-10-CM

## 2018-12-12 DIAGNOSIS — R26.9 GAIT DISORDER: ICD-10-CM

## 2018-12-12 DIAGNOSIS — M21.371 BILATERAL FOOT-DROP: ICD-10-CM

## 2018-12-12 DIAGNOSIS — E66.01 CLASS 2 SEVERE OBESITY DUE TO EXCESS CALORIES WITH SERIOUS COMORBIDITY AND BODY MASS INDEX (BMI) OF 36.0 TO 36.9 IN ADULT (HCC): ICD-10-CM

## 2018-12-12 DIAGNOSIS — I89.0 LYMPHEDEMA OF BOTH LOWER EXTREMITIES: ICD-10-CM

## 2018-12-12 DIAGNOSIS — M21.372 BILATERAL FOOT-DROP: ICD-10-CM

## 2018-12-12 DIAGNOSIS — K59.01 SLOW TRANSIT CONSTIPATION: ICD-10-CM

## 2018-12-12 DIAGNOSIS — M21.371 FOOT DROP, RIGHT: ICD-10-CM

## 2018-12-12 DIAGNOSIS — R39.9 UTI SYMPTOMS: ICD-10-CM

## 2018-12-12 DIAGNOSIS — S68.110A AMPUTATION OF RIGHT INDEX FINGER: ICD-10-CM

## 2018-12-12 DIAGNOSIS — I50.32 CHRONIC DIASTOLIC HEART FAILURE (HCC): Primary | ICD-10-CM

## 2018-12-12 PROCEDURE — 1123F ACP DISCUSS/DSCN MKR DOCD: CPT | Performed by: PHYSICIAN ASSISTANT

## 2018-12-12 PROCEDURE — G8484 FLU IMMUNIZE NO ADMIN: HCPCS | Performed by: PHYSICIAN ASSISTANT

## 2018-12-12 PROCEDURE — 1111F DSCHRG MED/CURRENT MED MERGE: CPT | Performed by: PHYSICIAN ASSISTANT

## 2018-12-12 PROCEDURE — 1101F PT FALLS ASSESS-DOCD LE1/YR: CPT | Performed by: PHYSICIAN ASSISTANT

## 2018-12-12 PROCEDURE — G8417 CALC BMI ABV UP PARAM F/U: HCPCS | Performed by: PHYSICIAN ASSISTANT

## 2018-12-12 PROCEDURE — 4040F PNEUMOC VAC/ADMIN/RCVD: CPT | Performed by: PHYSICIAN ASSISTANT

## 2018-12-12 PROCEDURE — 1036F TOBACCO NON-USER: CPT | Performed by: PHYSICIAN ASSISTANT

## 2018-12-12 PROCEDURE — 99214 OFFICE O/P EST MOD 30 MIN: CPT | Performed by: PHYSICIAN ASSISTANT

## 2018-12-12 PROCEDURE — 3017F COLORECTAL CA SCREEN DOC REV: CPT | Performed by: PHYSICIAN ASSISTANT

## 2018-12-12 PROCEDURE — G8427 DOCREV CUR MEDS BY ELIG CLIN: HCPCS | Performed by: PHYSICIAN ASSISTANT

## 2018-12-12 ASSESSMENT — ENCOUNTER SYMPTOMS
SHORTNESS OF BREATH: 1
CONSTIPATION: 1

## 2018-12-12 NOTE — PROGRESS NOTES
[address] [facility]  [address]  [address] [facility] [address]  Dept: [phone]  Dept Fax: [phone]    Deidra King is a 68 y.o. male who presents today for   Chief Complaint   Patient presents with    Congestive Heart Failure     2 month F/U    and follow upof chronic medical problems:   Patient Active Problem List   Diagnosis    Atrial fibrillation ([address]    Hyperlipidemia    GERD (gastroesophageal reflux disease)    Osteoarthritis of lumbar spine    OA (osteoarthritis) of knee, bilateral    Foot drop, right    Impaired hearing    Chronic rhinosinusitis    Lymphedema of both lower extremities    Hallux valgus, acquired, bilateral    Gait disorder    S/P revision of total knee    Fracture of femur ([address]    Hypertension    Slow transit constipation    Internal hemorrhoids    Calculus of gallbladder without cholecystitis without obstruction    MARIAMA on CPAP    Chronic diastolic heart failure (HCC)    Moderate obesity    Simple chronic bronchitis (HCC)    History of esophagogastroduodenoscopy (EGD)    Acute superficial gastritis without hemorrhage    Acute diastolic congestive heart failure (HCC)    Gastric out let obstruction    Anastomotic stricture of stomach    Nausea & vomiting   .     Past Medical History:   Diagnosis Date    Asthma     Atrial fibrillation ([address]     GERD (gastroesophageal reflux disease)     Hyperlipidemia     Hypertension     Obesity     Osteoarthritis     Unspecified sleep apnea        Past Surgical History:   Procedure Laterality Date    CARPAL TUNNEL RELEASE      bilateral    COLONOSCOPY      FINGER AMPUTATION  1966    first knuckle right index finger    GASTRIC BYPASS SURGERY  1985    vertical banded gastroplasty    HEMORRHOID SURGERY      JOINT REPLACEMENT  2004 & 2005    bilateral knees    ID EGD FLEXIBLE FOREIGN BODY REMOVAL N/A 8/16/2018    EGD FOREIGN BODY REMOVAL performed by Kenneth Viramontes

## 2018-12-12 NOTE — PROGRESS NOTES
treatments. HPI    Patient Active Problem List   Diagnosis    Atrial fibrillation ([facility]    Hyperlipidemia    GERD (gastroesophageal reflux disease)    Osteoarthritis of lumbar spine    OA (osteoarthritis) of knee, bilateral    Foot drop, right    Impaired hearing    Chronic rhinosinusitis    Lymphedema of both lower extremities    Hallux valgus, acquired, bilateral    Gait disorder    S/P revision of total knee    Fracture of femur ([facility]    Hypertension    Slow transit constipation    Internal hemorrhoids    Calculus of gallbladder without cholecystitis without obstruction    MARIAMA on CPAP    Chronic diastolic heart failure (HCC)    Class 2 severe obesity due to excess calories with serious comorbidity and body mass index (BMI) of 36.0 to 36.9 in adult [facility])    Simple chronic bronchitis (HCC)    Acute superficial gastritis without hemorrhage    Acute diastolic congestive heart failure (HCC)    Gastric out let obstruction    Anastomotic stricture of stomach    Nausea & vomiting       There are no preventive care reminders to display for this patient.     Allergies   Allergen Reactions    Darvocet [Propoxyphene N-Acetaminophen] Hives    Other Hives    Oxycodone-Acetaminophen          Current Outpatient Prescriptions   Medication Sig Dispense Refill    Foot Care Products (TRI-BALANCE ORTHOTICS MENS) MISC Provide insurance covered orthotics shoes, wear daily 2 each 0    rivaroxaban (XARELTO) 20 MG TABS tablet Take 1 tablet by mouth daily (with breakfast) 30 tablet 3    metoprolol succinate (TOPROL XL) 25 MG extended release tablet Take 1 tablet by mouth daily 30 tablet 3    tiZANidine (ZANAFLEX) 4 MG tablet Take 1 tablet by mouth nightly as needed (Myalgia) 30 tablet 2    pantoprazole (PROTONIX) 40 MG tablet TAKE 1 TABLET BY MOUTH DAILY EVERY MORNING BEFORE BREAKFAST 28 tablet 2    fluticasone (FLONASE) 50 MCG/ACT nasal spray 1 spray by Each Nare route daily      gabapentin (NEURONTIN) 300 MG capsule TAKE ONE CAPSULE BY MOUTH 3 TIMES A DAY. 270 capsule 0    albuterol (PROVENTIL) 2 MG tablet Use as needed 1 tablet 0    Multiple Vitamin (MULTIVITAMIN) tablet Take 1 tablet by mouth daily 30 tablet 2    cetirizine (ZYRTEC) 10 MG tablet TAKE 1 TABLET BY MOUTH DAILY 30 tablet 1    metoclopramide (REGLAN) 5 MG/5ML solution Take 5 mLs by mouth 4 times daily (before meals and nightly) (Patient taking differently: Take 5 mg by mouth 4 times daily (before meals and nightly) ) 750 mL 3    Incontinence Supply Disposable (INCONTINENCE BRIEF LARGE) MISC To use as directed. Use 3x a day 90 each 3    polyethylene glycol (GLYCOLAX) powder Take 17 g by mouth daily 527 g 3    ondansetron (ZOFRAN-ODT) 4 MG disintegrating tablet Take 4 mg by mouth      Armodafinil 200 MG TABS Take 1 tablet by mouth every morning. Clifford Arguelles ciclopirox (PENLAC) 8 % solution Apply topically nightly to nails 6 mL 11    SYMBICORT 160-4.5 MCG/ACT AERO       albuterol (PROVENTIL HFA;VENTOLIN HFA) 108 (90 BASE) MCG/ACT inhaler Inhale 2 puffs into the lungs every 6 hours as needed for Wheezing.  ipratropium-albuterol (DUONEB) 0.5-2.5 (3) MG/3ML SOLN nebulizer solution Inhale 1 vial into the lungs every 4 hours       No current facility-administered medications for this visit. Social History   Substance Use Topics    Smoking status: Former Smoker     Packs/day: 1.00     Years: 20.00     Quit date: 12/14/1976    Smokeless tobacco: Never Used    Alcohol use No       Family History   Problem Relation Age of Onset    Cancer Mother     Heart Attack Father         REVIEW OFSYSTEMS:  Review of Systems   Constitutional: Negative for chills and fever. HENT: Negative for congestion. Respiratory: Positive for shortness of breath (on exertion). Negative for cough and wheezing. Cardiovascular: Negative for chest pain. Gastrointestinal: Positive for constipation. Negative for diarrhea, nausea and vomiting. 07/06/2018    HDL 52 07/06/2018    TRIG 101 07/06/2018       ASSESSMENT AND PLAN:  Scott Herrera was seen today for congestive heart failure. Diagnoses and all orders for this visit:    Chronic diastolic heart failure (HCC)    Foot drop, right  -     ASO Robby Ankle Brace    Bilateral foot-drop  -     Foot Care Products (TRI-BALANCE ORTHOTICS MENS) MISC; Provide insurance covered orthotics shoes, wear daily    Gait disorder    Class 2 severe obesity due to excess calories with serious comorbidity and body mass index (BMI) of 36.0 to 36.9 in adult [facility])    Lymphedema of both lower extremities    Slow transit constipation    Chronic atrial fibrillation (HCC)  -     rivaroxaban (XARELTO) 20 MG TABS tablet; Take 1 tablet by mouth daily (with breakfast)    Amputation of right index finger      FOLLOW UP AND INSTRUCTIONS:  Return in about 3 months (around 3/12/2019) for CHF, GI problem, Edema, Obesity, GERD. · Scott Herrera received counselingon the following healthy behaviors: nutrition    · Discussed use, benefit, and side effects of prescribed medications. Barriers to medication compliance addressed. All patient questions answered. Pt voiced understanding. · Patient given educational materials - see patient instructions    Electronically signed by Bhumika Osorio PA-C on 12/12/18 at 9:12 AM    This note is created with the assistance of a speech-recognition program. While intendingto generate a document that actually reflects the content of the visit, the document can still have some mistakes which may not have been identified and corrected by editing.

## 2018-12-17 ENCOUNTER — CARE COORDINATION (OUTPATIENT)
Dept: CARE COORDINATION | Age: 73
End: 2018-12-17

## 2018-12-17 ENCOUNTER — APPOINTMENT (OUTPATIENT)
Dept: PHYSICAL THERAPY | Facility: CLINIC | Age: 73
End: 2018-12-17
Payer: COMMERCIAL

## 2018-12-17 PROBLEM — E66.01 CLASS 2 SEVERE OBESITY DUE TO EXCESS CALORIES WITH SERIOUS COMORBIDITY AND BODY MASS INDEX (BMI) OF 36.0 TO 36.9 IN ADULT (HCC): Status: ACTIVE | Noted: 2017-11-17

## 2018-12-17 RX ORDER — METOCLOPRAMIDE HYDROCHLORIDE 5 MG/5ML
SOLUTION ORAL
Qty: 473 ML | OUTPATIENT
Start: 2018-12-17

## 2018-12-17 ASSESSMENT — ENCOUNTER SYMPTOMS
WHEEZING: 0
VOMITING: 0
NAUSEA: 0
BACK PAIN: 0
COUGH: 0
DIARRHEA: 0

## 2018-12-17 NOTE — TELEPHONE ENCOUNTER
Jennifer from [facility] called regarding pt refill they said they are packing up pt meds for Adal

## 2018-12-19 ENCOUNTER — TELEPHONE (OUTPATIENT)
Dept: PRIMARY CARE CLINIC | Age: 73
End: 2018-12-19

## 2018-12-19 DIAGNOSIS — R35.0 URINE FREQUENCY: ICD-10-CM

## 2018-12-20 RX ORDER — SULFAMETHOXAZOLE AND TRIMETHOPRIM 800; 160 MG/1; MG/1
TABLET ORAL
Qty: 10 TABLET | OUTPATIENT
Start: 2018-12-20

## 2018-12-20 RX ORDER — SULFAMETHOXAZOLE AND TRIMETHOPRIM 800; 160 MG/1; MG/1
1 TABLET ORAL 2 TIMES DAILY
Qty: 20 TABLET | Refills: 0 | Status: SHIPPED | OUTPATIENT
Start: 2018-12-20 | End: 2018-12-30

## 2018-12-20 RX ORDER — POTASSIUM CHLORIDE 20 MEQ/1
20 TABLET, EXTENDED RELEASE ORAL 2 TIMES DAILY
Qty: 60 TABLET | Refills: 2 | Status: ON HOLD | OUTPATIENT
Start: 2018-12-20 | End: 2019-01-02 | Stop reason: HOSPADM

## 2018-12-20 RX ORDER — METOCLOPRAMIDE HYDROCHLORIDE 5 MG/5ML
5 SOLUTION ORAL
Qty: 750 ML | Refills: 3 | Status: SHIPPED | OUTPATIENT
Start: 2018-12-20 | End: 2020-06-27

## 2018-12-24 ENCOUNTER — APPOINTMENT (OUTPATIENT)
Dept: PHYSICAL THERAPY | Facility: CLINIC | Age: 73
End: 2018-12-24
Payer: COMMERCIAL

## 2018-12-28 ENCOUNTER — TELEPHONE (OUTPATIENT)
Dept: PRIMARY CARE CLINIC | Age: 73
End: 2018-12-28

## 2018-12-28 ENCOUNTER — HOSPITAL ENCOUNTER (OUTPATIENT)
Dept: CT IMAGING | Age: 73
Discharge: HOME | End: 2018-12-30
Payer: COMMERCIAL

## 2018-12-28 DIAGNOSIS — R31.0 GROSS HEMATURIA: ICD-10-CM

## 2018-12-28 PROCEDURE — 6360000004 HC RX CONTRAST MEDICATION: Performed by: UROLOGY

## 2018-12-28 PROCEDURE — 74178 CT ABD&PLV WO CNTR FLWD CNTR: CPT

## 2018-12-28 RX ADMIN — IOVERSOL 120 ML: 741 INJECTION INTRA-ARTERIAL; INTRAVENOUS at 16:04

## 2019-01-02 ENCOUNTER — HOSPITAL ENCOUNTER (OUTPATIENT)
Age: 74
Setting detail: OUTPATIENT SURGERY
Discharge: HOME | End: 2019-01-02
Attending: UROLOGY | Admitting: UROLOGY
Payer: COMMERCIAL

## 2019-01-02 ENCOUNTER — TELEPHONE (OUTPATIENT)
Dept: PRIMARY CARE CLINIC | Age: 74
End: 2019-01-02

## 2019-01-02 ENCOUNTER — CARE COORDINATION (OUTPATIENT)
Dept: CARE COORDINATION | Age: 74
End: 2019-01-02

## 2019-01-02 VITALS
OXYGEN SATURATION: 98 % | TEMPERATURE: 97.5 F | RESPIRATION RATE: 16 BRPM | DIASTOLIC BLOOD PRESSURE: 68 MMHG | HEIGHT: 67 IN | WEIGHT: 235 LBS | BODY MASS INDEX: 36.88 KG/M2 | SYSTOLIC BLOOD PRESSURE: 111 MMHG | HEART RATE: 76 BPM

## 2019-01-02 PROCEDURE — 88120 CYTP URNE 3-5 PROBES EA SPEC: CPT

## 2019-01-02 PROCEDURE — 6370000000 HC RX 637 (ALT 250 FOR IP): Performed by: UROLOGY

## 2019-01-02 PROCEDURE — 7100000010 HC PHASE II RECOVERY - FIRST 15 MIN: Performed by: UROLOGY

## 2019-01-02 PROCEDURE — 3600000002 HC SURGERY LEVEL 2 BASE: Performed by: UROLOGY

## 2019-01-02 PROCEDURE — 7100000011 HC PHASE II RECOVERY - ADDTL 15 MIN: Performed by: UROLOGY

## 2019-01-02 PROCEDURE — 2709999900 HC NON-CHARGEABLE SUPPLY: Performed by: UROLOGY

## 2019-01-02 PROCEDURE — 3600000012 HC SURGERY LEVEL 2 ADDTL 15MIN: Performed by: UROLOGY

## 2019-01-02 RX ORDER — CIPROFLOXACIN 500 MG/1
TABLET, FILM COATED ORAL
Qty: 1 TABLET | Refills: 0
Start: 2019-01-02 | End: 2019-01-10 | Stop reason: ALTCHOICE

## 2019-01-02 RX ORDER — BUMETANIDE 0.5 MG/1
0.5 TABLET ORAL 2 TIMES DAILY
COMMUNITY
End: 2019-03-07 | Stop reason: SDUPTHER

## 2019-01-02 RX ORDER — CIPROFLOXACIN 500 MG/1
500 TABLET, FILM COATED ORAL ONCE
Status: COMPLETED | OUTPATIENT
Start: 2019-01-02 | End: 2019-01-02

## 2019-01-02 RX ADMIN — CIPROFLOXACIN 500 MG: 500 TABLET, FILM COATED ORAL at 10:45

## 2019-01-02 ASSESSMENT — PAIN DESCRIPTION - DESCRIPTORS: DESCRIPTORS: ACHING

## 2019-01-02 ASSESSMENT — PAIN SCALES - GENERAL: PAINLEVEL_OUTOF10: 0

## 2019-01-02 ASSESSMENT — PAIN - FUNCTIONAL ASSESSMENT: PAIN_FUNCTIONAL_ASSESSMENT: 0-10

## 2019-01-07 ENCOUNTER — TELEPHONE (OUTPATIENT)
Dept: PRIMARY CARE CLINIC | Age: 74
End: 2019-01-07

## 2019-01-07 DIAGNOSIS — M21.371 BILATERAL FOOT-DROP: Primary | ICD-10-CM

## 2019-01-07 DIAGNOSIS — M21.372 BILATERAL FOOT-DROP: Primary | ICD-10-CM

## 2019-01-10 ENCOUNTER — TELEPHONE (OUTPATIENT)
Dept: PRIMARY CARE CLINIC | Age: 74
End: 2019-01-10

## 2019-01-10 ENCOUNTER — OFFICE VISIT (OUTPATIENT)
Dept: BARIATRICS/WEIGHT MGMT | Age: 74
End: 2019-01-10
Payer: COMMERCIAL

## 2019-01-10 VITALS
WEIGHT: 235 LBS | SYSTOLIC BLOOD PRESSURE: 114 MMHG | DIASTOLIC BLOOD PRESSURE: 70 MMHG | BODY MASS INDEX: 36.88 KG/M2 | RESPIRATION RATE: 20 BRPM | HEART RATE: 68 BPM | HEIGHT: 67 IN

## 2019-01-10 DIAGNOSIS — K21.9 GASTROESOPHAGEAL REFLUX DISEASE WITHOUT ESOPHAGITIS: Primary | ICD-10-CM

## 2019-01-10 DIAGNOSIS — Z98.84 STATUS POST BARIATRIC SURGERY: ICD-10-CM

## 2019-01-10 DIAGNOSIS — J06.9 URI WITH COUGH AND CONGESTION: Primary | ICD-10-CM

## 2019-01-10 LAB — UROTHELIAL CANCER DETECTION: NORMAL

## 2019-01-10 PROCEDURE — 1101F PT FALLS ASSESS-DOCD LE1/YR: CPT | Performed by: SURGERY

## 2019-01-10 PROCEDURE — G8484 FLU IMMUNIZE NO ADMIN: HCPCS | Performed by: SURGERY

## 2019-01-10 PROCEDURE — 99213 OFFICE O/P EST LOW 20 MIN: CPT | Performed by: SURGERY

## 2019-01-10 PROCEDURE — 3017F COLORECTAL CA SCREEN DOC REV: CPT | Performed by: SURGERY

## 2019-01-10 PROCEDURE — 1036F TOBACCO NON-USER: CPT | Performed by: SURGERY

## 2019-01-10 PROCEDURE — G8427 DOCREV CUR MEDS BY ELIG CLIN: HCPCS | Performed by: SURGERY

## 2019-01-10 PROCEDURE — 1123F ACP DISCUSS/DSCN MKR DOCD: CPT | Performed by: SURGERY

## 2019-01-10 PROCEDURE — G8417 CALC BMI ABV UP PARAM F/U: HCPCS | Performed by: SURGERY

## 2019-01-10 PROCEDURE — 4040F PNEUMOC VAC/ADMIN/RCVD: CPT | Performed by: SURGERY

## 2019-01-10 RX ORDER — AZITHROMYCIN 250 MG/1
TABLET, FILM COATED ORAL
Qty: 6 TABLET | Refills: 0 | Status: SHIPPED | OUTPATIENT
Start: 2019-01-10 | End: 2019-01-15

## 2019-01-10 RX ORDER — MULTIVITAMIN WITH FOLIC ACID 400 MCG
1 TABLET ORAL DAILY
Qty: 28 TABLET | Refills: 1 | Status: SHIPPED | OUTPATIENT
Start: 2019-01-10 | End: 2019-02-07 | Stop reason: SDUPTHER

## 2019-01-10 RX ORDER — CETIRIZINE HYDROCHLORIDE 10 MG/1
10 TABLET ORAL DAILY
Qty: 28 TABLET | Refills: 1 | Status: SHIPPED | OUTPATIENT
Start: 2019-01-10 | End: 2019-03-06 | Stop reason: SDUPTHER

## 2019-01-10 RX ORDER — BENZONATATE 100 MG/1
100 CAPSULE ORAL 3 TIMES DAILY PRN
Qty: 15 CAPSULE | Refills: 0 | Status: SHIPPED | OUTPATIENT
Start: 2019-01-10 | End: 2019-01-15

## 2019-01-17 ENCOUNTER — CARE COORDINATION (OUTPATIENT)
Dept: CARE COORDINATION | Age: 74
End: 2019-01-17

## 2019-02-01 DIAGNOSIS — K25.3 ACUTE GASTRIC ULCER WITHOUT HEMORRHAGE OR PERFORATION: ICD-10-CM

## 2019-02-04 RX ORDER — PANTOPRAZOLE SODIUM 40 MG/1
40 TABLET, DELAYED RELEASE ORAL DAILY
Qty: 28 TABLET | Refills: 1 | Status: SHIPPED | OUTPATIENT
Start: 2019-02-04 | End: 2019-04-03 | Stop reason: SDUPTHER

## 2019-02-04 RX ORDER — SPIRONOLACTONE 25 MG/1
25 TABLET ORAL DAILY
Qty: 28 TABLET | Refills: 1 | Status: SHIPPED | OUTPATIENT
Start: 2019-02-04 | End: 2019-04-03 | Stop reason: SDUPTHER

## 2019-02-06 DIAGNOSIS — M47.816 SPONDYLOSIS OF LUMBAR REGION WITHOUT MYELOPATHY OR RADICULOPATHY: ICD-10-CM

## 2019-02-06 DIAGNOSIS — M79.10 MYALGIA: ICD-10-CM

## 2019-02-07 RX ORDER — MULTIVITAMIN WITH FOLIC ACID 400 MCG
1 TABLET ORAL DAILY
Qty: 30 TABLET | Refills: 5 | Status: SHIPPED | OUTPATIENT
Start: 2019-02-07 | End: 2019-08-21 | Stop reason: SDUPTHER

## 2019-02-07 RX ORDER — TIZANIDINE 4 MG/1
4 TABLET ORAL DAILY PRN
Qty: 30 TABLET | Refills: 1 | Status: SHIPPED | OUTPATIENT
Start: 2019-02-07 | End: 2019-04-03 | Stop reason: SDUPTHER

## 2019-03-05 ENCOUNTER — OFFICE VISIT (OUTPATIENT)
Dept: PRIMARY CARE CLINIC | Age: 74
End: 2019-03-05
Payer: COMMERCIAL

## 2019-03-05 VITALS
TEMPERATURE: 98.8 F | BODY MASS INDEX: 36.8 KG/M2 | WEIGHT: 235 LBS | SYSTOLIC BLOOD PRESSURE: 115 MMHG | DIASTOLIC BLOOD PRESSURE: 68 MMHG | HEART RATE: 74 BPM | OXYGEN SATURATION: 97 %

## 2019-03-05 DIAGNOSIS — J41.0 SIMPLE CHRONIC BRONCHITIS (HCC): ICD-10-CM

## 2019-03-05 DIAGNOSIS — E66.01 CLASS 2 SEVERE OBESITY DUE TO EXCESS CALORIES WITH SERIOUS COMORBIDITY AND BODY MASS INDEX (BMI) OF 36.0 TO 36.9 IN ADULT (HCC): ICD-10-CM

## 2019-03-05 DIAGNOSIS — I48.20 CHRONIC ATRIAL FIBRILLATION (HCC): ICD-10-CM

## 2019-03-05 DIAGNOSIS — K64.4 HEMORRHOIDS, EXTERNAL: Primary | ICD-10-CM

## 2019-03-05 DIAGNOSIS — K59.01 SLOW TRANSIT CONSTIPATION: ICD-10-CM

## 2019-03-05 DIAGNOSIS — I50.32 CHRONIC DIASTOLIC HEART FAILURE (HCC): ICD-10-CM

## 2019-03-05 DIAGNOSIS — G25.81 RESTLESS LEG SYNDROME: ICD-10-CM

## 2019-03-05 PROCEDURE — G8427 DOCREV CUR MEDS BY ELIG CLIN: HCPCS | Performed by: PHYSICIAN ASSISTANT

## 2019-03-05 PROCEDURE — 4040F PNEUMOC VAC/ADMIN/RCVD: CPT | Performed by: PHYSICIAN ASSISTANT

## 2019-03-05 PROCEDURE — 3023F SPIROM DOC REV: CPT | Performed by: PHYSICIAN ASSISTANT

## 2019-03-05 PROCEDURE — G8417 CALC BMI ABV UP PARAM F/U: HCPCS | Performed by: PHYSICIAN ASSISTANT

## 2019-03-05 PROCEDURE — G8926 SPIRO NO PERF OR DOC: HCPCS | Performed by: PHYSICIAN ASSISTANT

## 2019-03-05 PROCEDURE — 99214 OFFICE O/P EST MOD 30 MIN: CPT | Performed by: PHYSICIAN ASSISTANT

## 2019-03-05 PROCEDURE — 3017F COLORECTAL CA SCREEN DOC REV: CPT | Performed by: PHYSICIAN ASSISTANT

## 2019-03-05 PROCEDURE — 1123F ACP DISCUSS/DSCN MKR DOCD: CPT | Performed by: PHYSICIAN ASSISTANT

## 2019-03-05 PROCEDURE — 1036F TOBACCO NON-USER: CPT | Performed by: PHYSICIAN ASSISTANT

## 2019-03-05 PROCEDURE — G8484 FLU IMMUNIZE NO ADMIN: HCPCS | Performed by: PHYSICIAN ASSISTANT

## 2019-03-05 PROCEDURE — 1101F PT FALLS ASSESS-DOCD LE1/YR: CPT | Performed by: PHYSICIAN ASSISTANT

## 2019-03-05 RX ORDER — ROPINIROLE 0.25 MG/1
0.25 TABLET, FILM COATED ORAL NIGHTLY PRN
Qty: 30 TABLET | Refills: 0 | Status: SHIPPED | OUTPATIENT
Start: 2019-03-05 | End: 2019-04-03 | Stop reason: SDUPTHER

## 2019-03-05 RX ORDER — METOPROLOL SUCCINATE 25 MG/1
25 TABLET, EXTENDED RELEASE ORAL DAILY
Qty: 30 TABLET | Refills: 3 | Status: SHIPPED | OUTPATIENT
Start: 2019-03-05 | End: 2019-06-26 | Stop reason: SDUPTHER

## 2019-03-05 RX ORDER — POLYETHYLENE GLYCOL 3350 17 G/17G
17 POWDER, FOR SOLUTION ORAL DAILY
Qty: 527 G | Refills: 3 | Status: SHIPPED | OUTPATIENT
Start: 2019-03-05 | End: 2021-03-12

## 2019-03-05 RX ORDER — GUAIFENESIN 600 MG/1
600 TABLET, EXTENDED RELEASE ORAL DAILY
Qty: 30 TABLET | Refills: 0 | Status: SHIPPED | OUTPATIENT
Start: 2019-03-05 | End: 2019-04-03 | Stop reason: SDUPTHER

## 2019-03-05 ASSESSMENT — PATIENT HEALTH QUESTIONNAIRE - PHQ9
SUM OF ALL RESPONSES TO PHQ QUESTIONS 1-9: 0
1. LITTLE INTEREST OR PLEASURE IN DOING THINGS: 0
2. FEELING DOWN, DEPRESSED OR HOPELESS: 0
SUM OF ALL RESPONSES TO PHQ QUESTIONS 1-9: 0
SUM OF ALL RESPONSES TO PHQ9 QUESTIONS 1 & 2: 0

## 2019-03-05 ASSESSMENT — ENCOUNTER SYMPTOMS: SINUS COMPLAINT: 1

## 2019-03-12 ENCOUNTER — OFFICE VISIT (OUTPATIENT)
Dept: PRIMARY CARE CLINIC | Age: 74
End: 2019-03-12

## 2019-03-12 ENCOUNTER — HOSPITAL ENCOUNTER (OUTPATIENT)
Age: 74
Discharge: HOME | End: 2019-03-12
Payer: COMMERCIAL

## 2019-03-12 ENCOUNTER — TELEPHONE (OUTPATIENT)
Dept: PRIMARY CARE CLINIC | Age: 74
End: 2019-03-12

## 2019-03-12 ENCOUNTER — HOSPITAL ENCOUNTER (OUTPATIENT)
Dept: GENERAL RADIOLOGY | Age: 74
Discharge: HOME | End: 2019-03-14
Payer: COMMERCIAL

## 2019-03-12 ENCOUNTER — HOSPITAL ENCOUNTER (OUTPATIENT)
Age: 74
Discharge: HOME | End: 2019-03-14
Payer: COMMERCIAL

## 2019-03-12 VITALS
OXYGEN SATURATION: 98 % | WEIGHT: 237 LBS | TEMPERATURE: 99.5 F | HEART RATE: 87 BPM | SYSTOLIC BLOOD PRESSURE: 119 MMHG | BODY MASS INDEX: 37.11 KG/M2 | DIASTOLIC BLOOD PRESSURE: 71 MMHG

## 2019-03-12 DIAGNOSIS — R53.81 MALAISE: ICD-10-CM

## 2019-03-12 DIAGNOSIS — R06.02 SOB (SHORTNESS OF BREATH): ICD-10-CM

## 2019-03-12 DIAGNOSIS — R06.02 SOB (SHORTNESS OF BREATH): Primary | ICD-10-CM

## 2019-03-12 LAB
ABSOLUTE EOS #: 0.06 K/UL (ref 0–0.44)
ABSOLUTE IMMATURE GRANULOCYTE: <0.03 K/UL (ref 0–0.3)
ABSOLUTE LYMPH #: 0.84 K/UL (ref 1.1–3.7)
ABSOLUTE MONO #: 0.54 K/UL (ref 0.1–1.2)
ANION GAP SERPL CALCULATED.3IONS-SCNC: 12 MMOL/L (ref 9–17)
BASOPHILS # BLD: 0 % (ref 0–2)
BASOPHILS ABSOLUTE: <0.03 K/UL (ref 0–0.2)
BUN BLDV-MCNC: 15 MG/DL (ref 8–23)
BUN/CREAT BLD: NORMAL (ref 9–20)
CALCIUM SERPL-MCNC: 9.2 MG/DL (ref 8.6–10.4)
CHLORIDE BLD-SCNC: 102 MMOL/L (ref 98–107)
CO2: 25 MMOL/L (ref 20–31)
CREAT SERPL-MCNC: 0.73 MG/DL (ref 0.7–1.2)
DIFFERENTIAL TYPE: ABNORMAL
EOSINOPHILS RELATIVE PERCENT: 1 % (ref 1–4)
GFR AFRICAN AMERICAN: >60 ML/MIN
GFR NON-AFRICAN AMERICAN: >60 ML/MIN
GFR SERPL CREATININE-BSD FRML MDRD: NORMAL ML/MIN/{1.73_M2}
GFR SERPL CREATININE-BSD FRML MDRD: NORMAL ML/MIN/{1.73_M2}
GLUCOSE BLD-MCNC: 97 MG/DL (ref 70–99)
HCT VFR BLD CALC: 32.1 % (ref 40.7–50.3)
HEMOGLOBIN: 9.7 G/DL (ref 13–17)
IMMATURE GRANULOCYTES: 0 %
LYMPHOCYTES # BLD: 13 % (ref 24–43)
MCH RBC QN AUTO: 27.7 PG (ref 25.2–33.5)
MCHC RBC AUTO-ENTMCNC: 30.2 G/DL (ref 28.4–34.8)
MCV RBC AUTO: 91.7 FL (ref 82.6–102.9)
MONOCYTES # BLD: 8 % (ref 3–12)
NRBC AUTOMATED: 0 PER 100 WBC
PDW BLD-RTO: 16.9 % (ref 11.8–14.4)
PLATELET # BLD: 182 K/UL (ref 138–453)
PLATELET ESTIMATE: ABNORMAL
PMV BLD AUTO: 8.5 FL (ref 8.1–13.5)
POTASSIUM SERPL-SCNC: 4.5 MMOL/L (ref 3.7–5.3)
RBC # BLD: 3.5 M/UL (ref 4.21–5.77)
RBC # BLD: ABNORMAL 10*6/UL
SEG NEUTROPHILS: 78 % (ref 36–65)
SEGMENTED NEUTROPHILS ABSOLUTE COUNT: 5.04 K/UL (ref 1.5–8.1)
SODIUM BLD-SCNC: 139 MMOL/L (ref 135–144)
WBC # BLD: 6.5 K/UL (ref 3.5–11.3)
WBC # BLD: ABNORMAL 10*3/UL

## 2019-03-12 PROCEDURE — 85025 COMPLETE CBC W/AUTO DIFF WBC: CPT

## 2019-03-12 PROCEDURE — 36415 COLL VENOUS BLD VENIPUNCTURE: CPT

## 2019-03-12 PROCEDURE — 71046 X-RAY EXAM CHEST 2 VIEWS: CPT

## 2019-03-12 PROCEDURE — 80048 BASIC METABOLIC PNL TOTAL CA: CPT

## 2019-03-12 RX ORDER — AZITHROMYCIN 250 MG/1
TABLET, FILM COATED ORAL
Qty: 1 PACKET | Refills: 0 | Status: SHIPPED | OUTPATIENT
Start: 2019-03-12 | End: 2019-03-22 | Stop reason: ALTCHOICE

## 2019-03-12 ASSESSMENT — ENCOUNTER SYMPTOMS
BACK PAIN: 0
NAUSEA: 0
SHORTNESS OF BREATH: 0
SHORTNESS OF BREATH: 1
VOMITING: 0
CONSTIPATION: 0
DIARRHEA: 0
COUGH: 0
WHEEZING: 0

## 2019-03-14 ENCOUNTER — TELEPHONE (OUTPATIENT)
Dept: PRIMARY CARE CLINIC | Age: 74
End: 2019-03-14

## 2019-03-22 ENCOUNTER — OFFICE VISIT (OUTPATIENT)
Dept: PRIMARY CARE CLINIC | Age: 74
End: 2019-03-22
Payer: COMMERCIAL

## 2019-03-22 VITALS
BODY MASS INDEX: 36.1 KG/M2 | DIASTOLIC BLOOD PRESSURE: 58 MMHG | HEIGHT: 67 IN | HEART RATE: 70 BPM | RESPIRATION RATE: 20 BRPM | WEIGHT: 230 LBS | TEMPERATURE: 97.2 F | SYSTOLIC BLOOD PRESSURE: 111 MMHG

## 2019-03-22 DIAGNOSIS — Z76.0 MEDICATION REFILL: ICD-10-CM

## 2019-03-22 DIAGNOSIS — J06.9 ACUTE URI: Primary | ICD-10-CM

## 2019-03-22 DIAGNOSIS — R11.0 NAUSEA: ICD-10-CM

## 2019-03-22 PROCEDURE — 4040F PNEUMOC VAC/ADMIN/RCVD: CPT | Performed by: PHYSICIAN ASSISTANT

## 2019-03-22 PROCEDURE — 1036F TOBACCO NON-USER: CPT | Performed by: PHYSICIAN ASSISTANT

## 2019-03-22 PROCEDURE — 3017F COLORECTAL CA SCREEN DOC REV: CPT | Performed by: PHYSICIAN ASSISTANT

## 2019-03-22 PROCEDURE — G8427 DOCREV CUR MEDS BY ELIG CLIN: HCPCS | Performed by: PHYSICIAN ASSISTANT

## 2019-03-22 PROCEDURE — G8484 FLU IMMUNIZE NO ADMIN: HCPCS | Performed by: PHYSICIAN ASSISTANT

## 2019-03-22 PROCEDURE — G8417 CALC BMI ABV UP PARAM F/U: HCPCS | Performed by: PHYSICIAN ASSISTANT

## 2019-03-22 PROCEDURE — 99213 OFFICE O/P EST LOW 20 MIN: CPT | Performed by: PHYSICIAN ASSISTANT

## 2019-03-22 PROCEDURE — 1123F ACP DISCUSS/DSCN MKR DOCD: CPT | Performed by: PHYSICIAN ASSISTANT

## 2019-03-22 RX ORDER — CIPROFLOXACIN 500 MG/1
500 TABLET, FILM COATED ORAL 2 TIMES DAILY
Qty: 14 TABLET | Refills: 0 | Status: SHIPPED | OUTPATIENT
Start: 2019-03-22 | End: 2019-03-29

## 2019-03-22 RX ORDER — ONDANSETRON 4 MG/1
4 TABLET, ORALLY DISINTEGRATING ORAL EVERY 12 HOURS PRN
Qty: 14 TABLET | Refills: 0 | Status: SHIPPED | OUTPATIENT
Start: 2019-03-22 | End: 2019-03-29

## 2019-03-22 RX ORDER — FLUTICASONE PROPIONATE 50 MCG
1 SPRAY, SUSPENSION (ML) NASAL DAILY
Qty: 1 BOTTLE | Refills: 0 | Status: SHIPPED | OUTPATIENT
Start: 2019-03-22 | End: 2019-06-27 | Stop reason: SDUPTHER

## 2019-03-22 ASSESSMENT — ENCOUNTER SYMPTOMS
NAUSEA: 1
CONSTIPATION: 1
SORE THROAT: 0
WHEEZING: 0
SHORTNESS OF BREATH: 1
VOMITING: 0
COUGH: 0
RHINORRHEA: 0

## 2019-03-31 ASSESSMENT — ENCOUNTER SYMPTOMS
DIARRHEA: 0
BACK PAIN: 0

## 2019-04-09 ENCOUNTER — HOSPITAL ENCOUNTER (OUTPATIENT)
Dept: GENERAL RADIOLOGY | Age: 74
Discharge: HOME | End: 2019-04-11
Payer: COMMERCIAL

## 2019-04-09 ENCOUNTER — HOSPITAL ENCOUNTER (OUTPATIENT)
Age: 74
Discharge: HOME | End: 2019-04-11
Payer: COMMERCIAL

## 2019-04-09 DIAGNOSIS — J44.9 CHRONIC OBSTRUCTIVE PULMONARY DISEASE, UNSPECIFIED COPD TYPE (HCC): ICD-10-CM

## 2019-04-09 PROCEDURE — 71046 X-RAY EXAM CHEST 2 VIEWS: CPT

## 2019-04-22 ENCOUNTER — OFFICE VISIT (OUTPATIENT)
Dept: PRIMARY CARE CLINIC | Age: 74
End: 2019-04-22
Payer: COMMERCIAL

## 2019-04-22 VITALS
WEIGHT: 229.4 LBS | TEMPERATURE: 97.5 F | OXYGEN SATURATION: 99 % | BODY MASS INDEX: 35.93 KG/M2 | DIASTOLIC BLOOD PRESSURE: 78 MMHG | HEART RATE: 84 BPM | SYSTOLIC BLOOD PRESSURE: 121 MMHG
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
## 2019-04-22 PROCEDURE — G8427 DOCREV CUR MEDS BY ELIG CLIN: HCPCS | Performed by: NURSE PRACTITIONER

## 2019-04-22 PROCEDURE — 3017F COLORECTAL CA SCREEN DOC REV: CPT | Performed by: NURSE PRACTITIONER

## 2019-04-22 PROCEDURE — 1123F ACP DISCUSS/DSCN MKR DOCD: CPT | Performed by: NURSE PRACTITIONER

## 2019-04-22 PROCEDURE — 1036F TOBACCO NON-USER: CPT | Performed by: NURSE PRACTITIONER

## 2019-04-22 PROCEDURE — 99202 OFFICE O/P NEW SF 15 MIN: CPT | Performed by: NURSE PRACTITIONER

## 2019-04-22 PROCEDURE — G8417 CALC BMI ABV UP PARAM F/U: HCPCS | Performed by: NURSE PRACTITIONER

## 2019-04-22 PROCEDURE — 4040F PNEUMOC VAC/ADMIN/RCVD: CPT | Performed by: NURSE PRACTITIONER

## 2019-04-22 RX ORDER — CEPHALEXIN 500 MG/1
500 CAPSULE ORAL 4 TIMES DAILY
Qty: 28 CAPSULE | Refills: 0 | Status: SHIPPED | OUTPATIENT
Start: 2019-04-22 | End: 2019-04-29

## 2019-04-22 ASSESSMENT — ENCOUNTER SYMPTOMS
VOMITING: 0
ABDOMINAL PAIN: 0
DIARRHEA: 0
NAUSEA: 0
SHORTNESS OF BREATH: 0
EYE PAIN: 0
SORE THROAT: 0
BACK PAIN: 0
SINUS PAIN: 0
COUGH: 0

## 2019-04-22 NOTE — PROGRESS NOTES
Visit Information    Have you changed or started any medications since your last visit including any over-the-counter medicines, vitamins, or herbal medicines? no   Are you having any side effects from any of your medications? -  no  Have you stopped taking any of your medications? Is so, why? -  no    Have you seen any other physician or provider since your last visit? No  Have you had any other diagnostic tests since your last visit? No  Have you been seen in the emergency room and/or had an admission to a hospital since we last saw you? No  Have you had your routine dental cleaning in the past 6 months? no    Have you activated your Whois account? If not, what are your barriers?  Yes     Patient Care Team:  Heaven Garza PA-C as PCP - General (Physician Assistant)  Heaven Garza PA-C as PCP - S Attributed Provider  [facility] [address] [facility] MD Lesia as Consulting Physician (Gastroenterology)    Medical History Review  Past Medical, Family, and Social History reviewed and does not contribute to the patient presenting condition    Health Maintenance   Topic Date Due    DTaP/Tdap/Td vaccine (1 - Tdap) 10/31/1965    Shingles Vaccine (1 of 2) 10/31/1996    Flu vaccine (Season Ended) 09/09/2019 (Originally 9/1/2019)    Pneumococcal 65+ years Vaccine (2 of 2 - PPSV23) 08/17/2019    Potassium monitoring  03/12/2020    Creatinine monitoring  03/12/2020    Colon cancer screen colonoscopy  03/26/2023    Lipid screen  07/06/2023    AAA screen  Completed    Hepatitis C screen  Completed

## 2019-04-22 NOTE — PATIENT INSTRUCTIONS
ibuprofen (Advil, Motrin), or naproxen (Aleve). Read and follow all instructions on the label. · Do not take two or more pain medicines at the same time unless the doctor told you to. Many pain medicines have acetaminophen, which is Tylenol. Too much acetaminophen (Tylenol) can be harmful. · If your bite puts you at risk for rabies, you will get a series of shots over the next few weeks to prevent rabies. Your doctor will tell you when to get the shots. It is very important that you get the full cycle of shots. Follow your doctor's instructions exactly. · You may need a tetanus shot if you have not received one in the last 5 years. · If your doctor prescribed antibiotics, take them as directed. Do not stop taking them just because you feel better. You need to take the full course of antibiotics. When should you call for help? Call your doctor now or seek immediate medical care if:    · The skin near the bite turns cold or pale or it changes color.     · You lose feeling in the area near the bite, or it feels numb or tingly.     · You have trouble moving a limb near the bite.     · You have symptoms of infection, such as:  ? Increased pain, swelling, warmth, or redness near the wound. ? Red streaks leading from the wound. ? Pus draining from the wound. ? A fever.     · Blood soaks through the bandage. Oozing small amounts of blood is normal.     · Your pain is getting worse.    Watch closely for changes in your health, and be sure to contact your doctor if you are not getting better as expected. Where can you learn more? Go to https://[employer].Department of Health and Human Services. org and sign in to your adFreeq account. Enter E615 in the Numblebee box to learn more about \"Animal Bites: Care Instructions. \"     If you do not have an account, please click on the \"Sign Up Now\" link. Current as of: September 23, 2018  Content Version: 11.9  © 4777-0663 [employer].  Care instructions adapted under license by [employer]. If you have questions about a medical condition or this instruction, always ask your healthcare professional. [place] [address] any warranty or liability for your use of this information.

## 2019-04-22 NOTE — PROGRESS NOTES
[facility]  [address]  [address] [facility] [address]  Dept: [phone]  Dept Fax: [phone]     Annie Giraldo is a 68 y.o. male who presents to the urgent care today for his medicalconditions/complaints as noted below. Annie Giraldo is c/o of Animal Bite (Pt states that he was bit by a rat on his LT thumb this morning when he was laying in bed. Pt states that he called the ED and they told him that he just needs a tetanus shot. )      HPI:        80-year-old male presents complaining of a rat bite to his left thumb. Patient reports he was bit at 4 AM today when he noticed a rat on his bed and went to get him off in the rat that him. Patient denies any pain to the finger. The area has a mildly reddened abrasion, no signs of infection such as, warmth, drainage. The wound is superficial and does not need glued, suture, Steri-Strips. Relieving factors include none. Worsening factors include none. Past Medical History:   Diagnosis Date    Asthma     Atrial fibrillation (HCC)     GERD (gastroesophageal reflux disease)     Hyperlipidemia     Hypertension     Obesity     Osteoarthritis     Unspecified sleep apnea            Current Outpatient Medications   Medication Sig Dispense Refill    mupirocin (BACTROBAN) 2 % ointment Apply 3 times daily.  15 g 0    cephALEXin (KEFLEX) 500 MG capsule Take 1 capsule by mouth 4 times daily for 7 days 28 capsule 0    tiZANidine (ZANAFLEX) 4 MG tablet Take 1 tablet by mouth daily as needed (Myalgia) 28 tablet 3    spironolactone (ALDACTONE) 25 MG tablet Take 1 tablet by mouth daily 28 tablet 3    pantoprazole (PROTONIX) 40 MG tablet Take 1 tablet by mouth daily 28 tablet 3    rOPINIRole (REQUIP) 0.25 MG tablet Take 1 tablet by mouth nightly 30 tablet 3    guaiFENesin (MUCINEX) 600 MG extended release tablet Take 1 tablet by mouth daily as needed for Congestion 30 tablet 3    gabapentin visual disturbance. Respiratory: Negative for cough and shortness of breath. Cardiovascular: Negative for chest pain and palpitations. Gastrointestinal: Negative for abdominal pain, diarrhea, nausea and vomiting. Genitourinary: Negative for penile pain and testicular pain. Musculoskeletal: Negative for back pain, joint swelling and neck pain. Skin: Negative for rash. Abrasion bite left thumb   Neurological: Negative for dizziness and light-headedness. Hematological: Does not bruise/bleed easily. All other systems reviewed and are negative. Objective:      Physical Exam   Constitutional: He is oriented to person, place, and time. He appears well-developed and well-nourished. HENT:   Head: Atraumatic. Mouth/Throat: Oropharynx is clear and moist.   Cardiovascular: Normal rate. Pulmonary/Chest: Effort normal and breath sounds normal.   Neurological: He is alert and oriented to person, place, and time. Skin: Skin is warm and dry. Psychiatric: He has a normal mood and affect. His behavior is normal.   Nursing note and vitals reviewed. /78 (Site: Left Upper Arm, Position: Sitting, Cuff Size: Medium Adult)   Pulse 84   Temp 97.5 °F (36.4 °C) (Oral)   Wt 229 lb 6.4 oz (104.1 kg)   SpO2 99%   BMI 35.93 kg/m²     Assessment:       Diagnosis Orders   1. Rat bite, initial encounter  Tetanus-Diphth-Acell Pertussis (BOOSTRIX) injection 0.5 mL    mupirocin (BACTROBAN) 2 % ointment    cephALEXin (KEFLEX) 500 MG capsule       Plan:      Return if symptoms worsen or fail to improve. Orders Placed This Encounter   Medications    Tetanus-Diphth-Acell Pertussis (BOOSTRIX) injection 0.5 mL    mupirocin (BACTROBAN) 2 % ointment     Sig: Apply 3 times daily.      Dispense:  15 g     Refill:  0    cephALEXin (KEFLEX) 500 MG capsule     Sig: Take 1 capsule by mouth 4 times daily for 7 days     Dispense:  28 capsule     Refill:  0         Tetanus updated,   Discussed topical antibiotic dose/duration. Discussed oral antibiotic dose/duration. Discussed no rabies prophylaxis indicated. Discussed to follow up here or at an ER if sx worsen or persist.  Pt agreeable to plan. Patient given educational materials - see patient instructions. Discussed use, benefit, and side effects of prescribed medications. All patientquestions answered. Pt voiced understanding.     Electronically signed by JULIAN Crandall CNP on 4/22/2019at 11:42 AM

## 2019-04-30 ENCOUNTER — TELEPHONE (OUTPATIENT)
Dept: PRIMARY CARE CLINIC | Age: 74
End: 2019-04-30

## 2019-04-30 DIAGNOSIS — N39.490 OVERFLOW INCONTINENCE: ICD-10-CM

## 2019-04-30 DIAGNOSIS — N31.9 NEUROGENIC BLADDER: ICD-10-CM

## 2019-04-30 RX ORDER — UNDERPADS 23" X 36"
EACH MISCELLANEOUS
Qty: 90 EACH | Refills: 3 | Status: SHIPPED | OUTPATIENT
Start: 2019-04-30

## 2019-04-30 NOTE — TELEPHONE ENCOUNTER
Pt called stating he needs an order for incontinence supplies sent to [facility]. Fax order to [phone].

## 2019-06-24 ENCOUNTER — HOSPITAL ENCOUNTER (OUTPATIENT)
Age: 74
Discharge: HOME | End: 2019-06-24
Payer: COMMERCIAL

## 2019-06-24 LAB
ALT SERPL-CCNC: 13 U/L (ref 5–41)
AST SERPL-CCNC: 25 U/L
CHOLESTEROL, FASTING: 139 MG/DL
CHOLESTEROL/HDL RATIO: 2.2
HDLC SERPL-MCNC: 62 MG/DL
LDL CHOLESTEROL: 65 MG/DL (ref 0–130)
TRIGLYCERIDE, FASTING: 61 MG/DL
VLDLC SERPL CALC-MCNC: NORMAL MG/DL (ref 1–30)

## 2019-06-24 PROCEDURE — 80061 LIPID PANEL: CPT

## 2019-06-24 PROCEDURE — 84450 TRANSFERASE (AST) (SGOT): CPT

## 2019-06-24 PROCEDURE — 84460 ALANINE AMINO (ALT) (SGPT): CPT

## 2019-06-24 PROCEDURE — 36415 COLL VENOUS BLD VENIPUNCTURE: CPT

## 2019-06-27 ENCOUNTER — OFFICE VISIT (OUTPATIENT)
Dept: PRIMARY CARE CLINIC | Age: 74
End: 2019-06-27
Payer: COMMERCIAL

## 2019-06-27 VITALS
BODY MASS INDEX: 35 KG/M2 | DIASTOLIC BLOOD PRESSURE: 76 MMHG | SYSTOLIC BLOOD PRESSURE: 123 MMHG | RESPIRATION RATE: 20 BRPM | HEART RATE: 80 BPM | WEIGHT: 223 LBS | OXYGEN SATURATION: 98 % | HEIGHT: 67 IN

## 2019-06-27 DIAGNOSIS — Z23 NEED FOR PROPHYLACTIC VACCINATION AND INOCULATION AGAINST VARICELLA: ICD-10-CM

## 2019-06-27 DIAGNOSIS — K25.3 ACUTE GASTRIC ULCER WITHOUT HEMORRHAGE OR PERFORATION: ICD-10-CM

## 2019-06-27 DIAGNOSIS — M21.371 BILATERAL FOOT-DROP: ICD-10-CM

## 2019-06-27 DIAGNOSIS — R26.9 GAIT DISORDER: ICD-10-CM

## 2019-06-27 DIAGNOSIS — S68.119D TRAUMATIC AMPUTATION OF FINGER WITHOUT COMPLICATION, SUBSEQUENT ENCOUNTER: ICD-10-CM

## 2019-06-27 DIAGNOSIS — M21.372 BILATERAL FOOT-DROP: ICD-10-CM

## 2019-06-27 DIAGNOSIS — E66.09 CLASS 1 OBESITY DUE TO EXCESS CALORIES WITH SERIOUS COMORBIDITY AND BODY MASS INDEX (BMI) OF 34.0 TO 34.9 IN ADULT: ICD-10-CM

## 2019-06-27 DIAGNOSIS — Z76.0 MEDICATION REFILL: ICD-10-CM

## 2019-06-27 DIAGNOSIS — M47.816 SPONDYLOSIS OF LUMBAR REGION WITHOUT MYELOPATHY OR RADICULOPATHY: ICD-10-CM

## 2019-06-27 DIAGNOSIS — I48.20 CHRONIC ATRIAL FIBRILLATION (HCC): Primary | ICD-10-CM

## 2019-06-27 PROCEDURE — 1123F ACP DISCUSS/DSCN MKR DOCD: CPT | Performed by: PHYSICIAN ASSISTANT

## 2019-06-27 PROCEDURE — 1036F TOBACCO NON-USER: CPT | Performed by: PHYSICIAN ASSISTANT

## 2019-06-27 PROCEDURE — G8427 DOCREV CUR MEDS BY ELIG CLIN: HCPCS | Performed by: PHYSICIAN ASSISTANT

## 2019-06-27 PROCEDURE — 4040F PNEUMOC VAC/ADMIN/RCVD: CPT | Performed by: PHYSICIAN ASSISTANT

## 2019-06-27 PROCEDURE — 99214 OFFICE O/P EST MOD 30 MIN: CPT | Performed by: PHYSICIAN ASSISTANT

## 2019-06-27 PROCEDURE — G8417 CALC BMI ABV UP PARAM F/U: HCPCS | Performed by: PHYSICIAN ASSISTANT

## 2019-06-27 PROCEDURE — 3017F COLORECTAL CA SCREEN DOC REV: CPT | Performed by: PHYSICIAN ASSISTANT

## 2019-06-27 RX ORDER — FLUTICASONE PROPIONATE 50 MCG
1 SPRAY, SUSPENSION (ML) NASAL DAILY
Qty: 1 BOTTLE | Refills: 3 | Status: SHIPPED | OUTPATIENT
Start: 2019-06-27 | End: 2019-12-11 | Stop reason: SDUPTHER

## 2019-06-27 RX ORDER — ROPINIROLE 0.25 MG/1
0.25 TABLET, FILM COATED ORAL 2 TIMES DAILY
Qty: 60 TABLET | Refills: 3 | Status: SHIPPED | OUTPATIENT
Start: 2019-06-27 | End: 2019-10-16 | Stop reason: SDUPTHER

## 2019-06-27 RX ORDER — CETIRIZINE HYDROCHLORIDE 10 MG/1
10 TABLET ORAL DAILY PRN
Qty: 28 TABLET | Refills: 3 | Status: SHIPPED | OUTPATIENT
Start: 2019-06-27 | End: 2020-03-05

## 2019-06-27 RX ORDER — PANTOPRAZOLE SODIUM 40 MG/1
40 TABLET, DELAYED RELEASE ORAL DAILY
Qty: 28 TABLET | Refills: 3 | Status: SHIPPED | OUTPATIENT
Start: 2019-06-27 | End: 2019-10-16 | Stop reason: SDUPTHER

## 2019-06-27 NOTE — PROGRESS NOTES
Visit Information    Have you changed or started any medications since your last visit including any over-the-counter medicines, vitamins, or herbal medicines? no   Have you stopped taking any of your medications? Is so, why? -  no  Are you having any side effects from any of your medications? - no    Have you seen any other physician or provider since your last visit? yes - Dr. Amado Archuleta   Have you had any other diagnostic tests since your last visit?  no   Have you been seen in the emergency room and/or had an admission in a hospital since we last saw you?  no   Have you had your routine dental cleaning in the past 6 months?  no     Do you have an active MyChart account? If no, what is the barrier?   Yes    Patient Care Team:  Brian May PA-C as PCP - General (Physician Assistant)  Brian May PA-C as PCP - [facility] [address] [facility], [place] as Consulting Physician (Gastroenterology)    Medical History Review  Past Medical, Family, and Social History reviewed and does not contribute to the patient presenting condition    Health Maintenance   Topic Date Due    Shingles Vaccine (1 of 2) 10/31/1996    Annual Wellness Visit (AWV)  04/12/2016    Flu vaccine (Season Ended) 09/09/2019 (Originally 9/1/2019)    Pneumococcal 65+ years Vaccine (2 of 2 - PPSV23) 08/17/2019    Potassium monitoring  03/12/2020    Creatinine monitoring  03/12/2020    Colon cancer screen colonoscopy  03/26/2023    Lipid screen  06/24/2024    DTaP/Tdap/Td vaccine (2 - Td) 04/22/2029    AAA screen  Completed    Hepatitis C screen  Completed

## 2019-06-27 NOTE — PROGRESS NOTES
[facility]  [address]  [address]  Dept: [phone]  Dept Fax: [phone]    Office Progress/Follow Up Note  Date of patient's visit: 6/27/2019  Patient's Name:  Edwin Calderon YOB: 1945            Patient Care Team:  Pascual Simpson PA-C as PCP - General (Physician Assistant)  Pascual Simpson PA-C as PCP - [facility]  [facility] [address] [facility], [place] as Consulting Physician (Gastroenterology)  Wilner Anderson MD as Consulting Physician (Cardiology)  ================================================================    REASON FOR VISIT/CHIEF COMPLAINT:  3 Month Follow-Up (needs new handicap placard)    HISTORY OF PRESENTING ILLNESS:  History was obtained from: patient, spouse. Edwin Calderon is a 68 y.o. is here for follow-up for A. fib, GERD. Patient wife is present. Patient states she is compliant with medications. Patient states he was seen by cardiology, Dr. Jose Smith, for chronic A. fib, stable, was recently seen on 6/21/2019, inform patient will obtain office note. Patient voiced he is noticed increased bruising and bleeding, patient is on Xarelto, instructed patient to take Xarelto every other day to decrease incidence of bruising and bleeding. Discussed GERD in depth with patient, stable, patient requesting GERD medication refill. Patient is requesting handicap placard, handicap placard prescribed. Patient blood pressure stable in office. Patient has lost 6 pounds. Discussed weight loss in depth with patient. Patient is requesting additional medication refills. [address] gap diagnosis reviewed, stable. Labs reviewed. Health maintenance reviewed, discussed shingles vaccination in depth with patient, patient provided prescription for shingles vaccine at local pharmacy.   Medications reviewed, prescribed Xarelto 20 mg every other day, refill Requip 0.25 mg,

## 2019-06-28 ENCOUNTER — TELEPHONE (OUTPATIENT)
Dept: PRIMARY CARE CLINIC | Age: 74
End: 2019-06-28

## 2019-06-28 DIAGNOSIS — M21.371 FOOT DROP, RIGHT: ICD-10-CM

## 2019-06-28 DIAGNOSIS — R26.9 GAIT DISORDER: Primary | ICD-10-CM

## 2019-07-06 ENCOUNTER — TELEPHONE (OUTPATIENT)
Dept: PRIMARY CARE CLINIC | Age: 74
End: 2019-07-06

## 2019-07-06 ASSESSMENT — ENCOUNTER SYMPTOMS
DIARRHEA: 0
CONSTIPATION: 0
VOMITING: 0
WHEEZING: 0
BACK PAIN: 0
COLOR CHANGE: 1
COUGH: 0
SHORTNESS OF BREATH: 0
NAUSEA: 0

## 2019-07-17 ENCOUNTER — HOSPITAL ENCOUNTER (OUTPATIENT)
Dept: OCCUPATIONAL THERAPY | Age: 74
Setting detail: THERAPIES SERIES
Discharge: HOME | End: 2019-07-17
Payer: COMMERCIAL

## 2019-07-17 PROCEDURE — 97537 COMMUNITY/WORK REINTEGRATION: CPT

## 2019-07-17 PROCEDURE — 97167 OT EVAL HIGH COMPLEX 60 MIN: CPT

## 2019-07-17 NOTE — PROGRESS NOTES
chronic  bronchitis ([address] Acute superficial gastritis without hemorrhage; Gastric out let obstruction; Anastomotic stricture of stomach; and  Nausea & vomiting on their  problem list.    Medical History:    Current Medications: Zanaflex, Aldactone, Requip, Xarelto, Klor-Con M, Glycolax, Toprol XL, Reglan, Neurontin, Bumex, Preventil and  Armodafinil    Seizure History: n/a    Hearing: Bilateral hearing aids. Current/Prior Participation in Therapies: Patient participated in OP physical therapy in 2018 to maximize LE strength and balance. Patient goes to gym weekly to complete exercise program.    Driving History:    Prior Driving Evaluation: n/a     License Number: [place] [id number]     License Expiration Date: 10-    Driving Restrictions: n/a    Traffic Citations: n/a    Traffic Accidents: n/a    Type of Vehicle: Patient planning to drive a sedan or a van. Current Driving Habits/Driving Goals: Patient currently driving. Patient reported that he drove wearing his R AFO for acceleration and used his L foot for braking. Clinical Visual/Perceptual Assessment:    Corrective Lenses: Patient wears bifocal prescription lenses for driving. Visual Acuity: 20/30 binocular and L and R eyes, which is [facility] for driving. Visual Fields/Peripheral Vision: Intact visual attention to visual stimuli presented in right and left visual fields; decreased with bilateral  simultaneous stimulation testing.     Fusion/Alignment: Mild exophoria on Far Lateral Phoria test    Dynamic Vision: Decreased visual pursuits left of midline; scanning intact; saccades intact; and, convergence intact    Color Perception/Discrimination: 4/6 Far Color Perception, which is mildly reduced; 16/16 on Color Discrimination test    Depth Perception: 8/9 on Far Stereo Depth Perception test, which is [facility]    Contrast Sensitivity: 7/9 on Functional Acuity Contrast test, which is [facility]    Glare Recovery: Intact    Road Sign Identification:

## 2019-07-25 ENCOUNTER — TELEPHONE (OUTPATIENT)
Dept: PRIMARY CARE CLINIC | Age: 74
End: 2019-07-25

## 2019-07-25 ENCOUNTER — HOSPITAL ENCOUNTER (OUTPATIENT)
Dept: OCCUPATIONAL THERAPY | Age: 74
Setting detail: THERAPIES SERIES
Discharge: HOME | End: 2019-07-25
Payer: COMMERCIAL

## 2019-07-25 DIAGNOSIS — R26.9 GAIT DISORDER: Primary | ICD-10-CM

## 2019-07-25 DIAGNOSIS — M21.371 FOOT DROP, RIGHT: ICD-10-CM

## 2019-07-25 PROCEDURE — 97537 COMMUNITY/WORK REINTEGRATION: CPT

## 2019-07-25 NOTE — TELEPHONE ENCOUNTER
Pt called and stated that the driving program that he is in at [facility] stated that he needs a referral to PT. The pt stated that it was sent electronically and would like to know if you can approve it today.      Please advise

## 2019-07-29 ENCOUNTER — HOSPITAL ENCOUNTER (OUTPATIENT)
Dept: OCCUPATIONAL THERAPY | Age: 74
Setting detail: THERAPIES SERIES
Discharge: HOME | End: 2019-07-29
Payer: COMMERCIAL

## 2019-07-29 PROCEDURE — 97537 COMMUNITY/WORK REINTEGRATION: CPT

## 2019-08-01 ENCOUNTER — HOSPITAL ENCOUNTER (OUTPATIENT)
Dept: PHYSICAL THERAPY | Facility: CLINIC | Age: 74
Setting detail: THERAPIES SERIES
Discharge: HOME | End: 2019-08-01
Payer: COMMERCIAL

## 2019-08-01 PROCEDURE — 97110 THERAPEUTIC EXERCISES: CPT

## 2019-08-01 PROCEDURE — 97162 PT EVAL MOD COMPLEX 30 MIN: CPT

## 2019-08-01 NOTE — CONSULTS
- less than 3 years ago.     Home situation: 5 steps into the home, (1 railing &  house on the other side) - 2 handles up by the doorway. Pt does not go upstairs or downstairs. Wife and daughters (live in the home) helping with the laundry & various other pt needs. First floor bedroom and bathroom. [facility] in the shower, [facility], has reacher as well- some difficulty getting legs up and over. Raised toilet seat and grab bars throughout the home.        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, femur recontruction                [x] Refer to full medical chart  In EPIC    Medications: [x] Refer to full medical record [] None [] Other:  Allergies:      [x] Refer to full medical record [] None [] Other:    Function:  Hand Dominance  [x] Right  [] Left    Working:   [x] Retired -  - [employer] - enjoys watching people, being a \"spectating participant\"    Pain:  [x] Yes  [] No Location: B knees  Pain Rating: (0-10 scale) 5/10  Pain altered Tx:  [x] No  [] Yes  Action:    Symptoms:  [x] Improving [] Worsening [] Same    Better:  [] AM    [] PM    [] Sit    [] Rise/Sit    []Stand    [] Walk    [] Lying    [] Other:  Worse: [] AM    [] PM    [] Sit    [x] Rise/Sit    [x]Stand    [x] Walk    [] Lying    [x] Bend                             [] Valsalva    [x] Other:driving    Sleep: [] OK    [x] Disturbed    Objective:    AROM°  Globally WFLs in BUEs/BLEs STRENGTH  BUEs WNLs    POSTURE No deficit Deficit Not Tested     Left Right Left Right Kyphosis []  [x]  []    Shoulder Flex         Lordosis []  [x]   Posterior pelvic tilt []    Abd         Forward Head []  [x]  []    Elbow Flex         Rounded Shldrs []  [x]  []    Ext         Slumped Sitting []  [x]  []    Wrist Flex         Skin Integrity []  [x] dryness, decreased turgor d/t aging []    Ext                   Hand      50 50 NEUROLOGICAL         Hip Flex     3+ 4- Reflexes []  [x] globally decreased in BUEs/BLEs 1+ to 0 []    Modified Ext     3+ 3+ Sensation []  [x] B hand/foot neuropathy  Digits 4-5 of hand feel weighted down & numb  Tingling and burning in bilateral feet \"not diabetic\", end of toes \"very sensitive\" []     Abd     4- 4- Bladder/Bowel []  [x] bladder/bowel incontinent []    Knee Flex     3+ 3+ Coordination []  [x] BLEs/ UEs WNLs []    Ext     4- 4- Tone [x]  []  []    Ankle DF 4 3 4- 4- Clonus []  [x] 3+ beats BLEs []    PF 23 22 3+ 3+ Tremors - intention []  [x]  []       OBSERVATION No Deficit Deficit Not Tested Comments   Posture           Genu Valgus []  [x]  []  L>R   Pronation []  [x]  []  Wearing bilateral AFOs   Leg Length Discrp []  [x]  []  Due to valgus of left knee, standing asymetric, suspect not a true bony leg lenth discrepancy   Slumped Sitting []  [x]  []      Palpation []  [x]  []  Tender in left knee   Edema []  [x]  []  ankles     FUNCTION INDEP MIN ASSIST MOD ASSIST MAX ASSIST NOT TESTED   Bed Mobility             -rolling []  [x]  []  []  []    -sit to supine []  [x]  []  []  []    -supine to sit []  [x]  []  []  []    Transfers             -sit to stand []  [x]  []  []  []    -pivot []  [x]  []  []  []    Balance             -sitting static [x]  []  []  []  []    -dynamic []  [x]  []  []  []    -standing static []  []  [x]  []  []    -dynamic []  []  []  [x]  []    Ambulation []  [x]  []  []  []    Distance/Device: RW   Analysis: assymetric, slow paced, with B AFOs donned; decreased R stride length & decreased B heel/toe progression. Groom/Dress []  [x]  []  []  []       Special Testing:          [x] Tinetti Balance Assessment Tool: 7/28= 75% defict (high fall risk);  See soft chart for printed copy  [x] Other: LEFS 50/80 = 37.5 % deficit [x] Other: 5 times sit to stand: 22seconds      Assessment: Pt presents with s/s of balance deficits due to general aging process & R drop foot, which are both contributing to imbalance. Pt has also has been experiencing L foot drop, but he wears B AFOs that seem to help for ambulation. Pt's major difficulty is driving, as he has weakness in his R foot and ankle & has difficulty transitioning from gas to brake pedals. Pt is currently being seen by OT at [facility] for driving [address] suggested pt seek PT services. Pt does not have a h/o falls, but he is at risk for future falls, as demonstrated by Sharon Eng fall risk assessment, tinetti balance assessment & 5x sit to stand . Please see below problem section for further details. Problems at initial evaluation 8/1/19:    [x] ? Pain: 5/10 \"pain in entire body,\" especially B hands/B feet d/t peripheral neuropathy & B knees (TKAs). [x] ? Strength: Decreased globally throughout BLEs, karson at B ankles. [x] ? ROM: B ankles  [x] ? Function: LEFS 50/80 = 37.5 % deficit  [x] ? Balance: Tinetti Balance Assessment Tool: 7/28= 75% defict (high fall risk); See soft chart for printed copy; 5 times sit to stand: 22 seconds; Vargas fall risk assessment = 25 points   [x] ? Coordination: BLEs. [x] Postural Deviations: flat back posture, forward head, Resting posture ER of R foot, able to come to midline but doesn't rest in midline or gait with toes forwards. B genu valgum L>R. Standing posture - left knee valgus with weight on medial foot, right knee extended, wt shifted to right side. B pronated feet with rear foot valgus. [x] Gait Deviations: assymetric, slow paced, with B AFOs donned; decreased R stride length & decreased B heel/toe progression. [x] Other: Peripheral Neuropathy -- B Stocking glove/distribution ; Clonus 3+ beats BLEs; bladder incontinent; DTRs globally decreased in BUEs/BLEs 1+ to 0.  BLE ankle

## 2019-08-01 NOTE — PLAN OF CARE
better 75% improvement with transitioning R foot from gas to brake pedals to improve driving function.     Patient goals:\" To gain strength in ankle for driving purposes\"     Rehab Potential:  [] Good  [x] Fair  [] Poor    Suggested Professional Referral:  [] No  [x] Yes: ongoing with cardiologist, pulmonologist, PCP. Barriers to Goal Achievement:  [] No  [x] Yes: COPD     Treatment Plan:  [x] Therapeutic Exercise             [x] Modalities:  [x] Therapeutic Activity               [] Ultrasound                  [x] Electrical Stimulation  [x] Gait Training                          [] Massage        [] Lumbar/Cervical Traction  [x] Neuromuscular Re-education            [x] Cold/hotpack  [] Iontophoresis: 4 mg/mL  [x] Instruction in HEP                                                                       Dexamethasone Sodium  [x] Manual Therapy                                                                          Phosphate  mAmin  [] Aquatic Therapy                                 [x] Dry Needling  [] Other:     []  Medication allergies reviewed for use of               Dexamethasone Sodium Phosphate 4mg/ml                with iontophoresis treatments. Pt is not allergic.     Frequency: 1-2 x/weeks for 18 visits     Electronically signed by: Adelia Aase, PT    Physician Signature:________________________________Date:__________________  By signing above or cosigning this note, I have reviewed this plan of care and certify a need for medically necessary rehabilitation services.      *PLEASE SIGN ABOVE AND FAX BACK ALL PAGES*

## 2019-08-05 ENCOUNTER — HOSPITAL ENCOUNTER (OUTPATIENT)
Dept: OCCUPATIONAL THERAPY | Age: 74
Setting detail: THERAPIES SERIES
Discharge: HOME | End: 2019-08-05
Payer: COMMERCIAL

## 2019-08-05 PROCEDURE — 97537 COMMUNITY/WORK REINTEGRATION: CPT

## 2019-08-08 ENCOUNTER — HOSPITAL ENCOUNTER (OUTPATIENT)
Dept: PHYSICAL THERAPY | Facility: CLINIC | Age: 74
Setting detail: THERAPIES SERIES
Discharge: HOME | End: 2019-08-08
Payer: COMMERCIAL

## 2019-08-08 PROCEDURE — 97110 THERAPEUTIC EXERCISES: CPT

## 2019-08-08 NOTE — FLOWSHEET NOTE
safety, gait & balance. 8. Pt to improve Tinetti Balance Score to 15 points to improve dynamic balance & improve fall risk.      LTG: (to be met in 18 treatments)  1. ?Pain: To <1-2/10 with activity to increase ease with ADLs.   2. ? Strength: BLEs, throughout, to 4+/5 to ease standing, balancing & WBing activity. 3. ? ROM: B ankles to 10 degrees to improve foot clearance with gait for fall prevention. 4. ? Function: LEFS to <20% deficit to ease ADLs & improve quality of life. 5. Pt to ambulate modified independently with RW for >800' with improved gait mechanics to improve safety & overall community navigation.    6. Pt to improve Tinetti Balance Score to >24 points to improve dynamic balance & reduce fall risk.    7. Pt to improve 5 times sit to stand in 15seconds to improve safety, gait & balance.   8. Pt to improve all transfers to modified independent to improve overall mobility, safely. 9. Pt to verbalize better 75% improvement with transitioning R foot from gas to brake pedals to improve driving function.     Patient goals:\" To gain strength in ankle for driving purposes\"    Pt. Education:  [x] Yes  [] No  [x] Reviewed Prior HEP/Ed  Method of Education: [x] Verbal  [x] Demo new exercises  [] Written  Comprehension of Education:  [x] Verbalizes understanding. [] Demonstrates understanding. [x] Needs review. [] Demonstrates/verbalizes HEP/Ed previously given. Plan: [x] Continue per plan of care.    [] Other:      Time In:1258            Time Out: 5880    Electronically signed by:  Baljeet Rodríguez PTA

## 2019-08-12 ENCOUNTER — HOSPITAL ENCOUNTER (OUTPATIENT)
Dept: OCCUPATIONAL THERAPY | Age: 74
Setting detail: THERAPIES SERIES
Discharge: HOME | End: 2019-08-12
Payer: COMMERCIAL

## 2019-08-12 PROCEDURE — 97537 COMMUNITY/WORK REINTEGRATION: CPT

## 2019-08-14 ENCOUNTER — HOSPITAL ENCOUNTER (OUTPATIENT)
Dept: PHYSICAL THERAPY | Facility: CLINIC | Age: 74
Setting detail: THERAPIES SERIES
Discharge: HOME | End: 2019-08-14
Payer: COMMERCIAL

## 2019-08-14 PROCEDURE — G0283 ELEC STIM OTHER THAN WOUND: HCPCS

## 2019-08-14 PROCEDURE — 97110 THERAPEUTIC EXERCISES: CPT

## 2019-08-19 ENCOUNTER — HOSPITAL ENCOUNTER (OUTPATIENT)
Dept: OCCUPATIONAL THERAPY | Age: 74
Setting detail: THERAPIES SERIES
Discharge: HOME | End: 2019-08-19
Payer: COMMERCIAL

## 2019-08-19 PROCEDURE — 97537 COMMUNITY/WORK REINTEGRATION: CPT

## 2019-08-19 NOTE — PROGRESS NOTES
Occupational [address] [facility]  Rehabilitation Services  Occupational Therapy Treatment Note  Date: 19  Patient Name: Vitor Rachel    MRN: [id number]  Account: [de-identified]   : 1945  (74 y.o.) Gender: male     Subjective: Patient continues to participate in outpatient physical therapy at [facility] and is currently using electrical stimulation to increase R dorsiflexion. Discussed the option of a R dorsiflexion assist AFO versus static AFO, which patient currently wears. Objective: Compared patient Brake Reaction Time scores from initial evaluation on 19 to current scores on same test today:     19:  Brake Reaction Times (release of gas to apply the brake of 0.4/100 sec. < 65; 0.5/100 sec. > 65; stopping distance of 175 feet): patient with brake reaction times averaging between . 5/100 sec. - .7/100 sec, which is inconsistent; patient with stopping distances that ranged between 159' - 323', which was inconsistent, with patient reaction times and stopping distances BNL some of the trials. 19: Brake Reaction Times (release of gas to apply the brake of 0.4/100 sec. < 65; 0.5/100 sec. > 65; stopping distance of 175 feet): patient with brake reaction times averaging . 5/100 sec., which is [facility]; patient with stopping distances averaged 186', which is WFL. Behind the Wheel Assessment: Patient modified independent with entering and exiting vehicle; able to maintain visual clearance and clearance from steering wheel; able to adjust seat and mirrors and fasten and unfasten seatbelt.      Non-Traffic Driving: Patient able to operate the primary controls independently that included the steering wheel, accelerator, and brake; and, secondary controls that included starting the vehicle, shifting the gears, ability to operate the primary controls independently that included the steering, operating the turn signals; and, backing maneuvers.

## 2019-08-22 ENCOUNTER — HOSPITAL ENCOUNTER (OUTPATIENT)
Dept: PHYSICAL THERAPY | Facility: CLINIC | Age: 74
Setting detail: THERAPIES SERIES
Discharge: HOME | End: 2019-08-22
Payer: COMMERCIAL

## 2019-08-22 PROCEDURE — 97112 NEUROMUSCULAR REEDUCATION: CPT

## 2019-08-22 PROCEDURE — 97110 THERAPEUTIC EXERCISES: CPT

## 2019-08-22 NOTE — FLOWSHEET NOTE
given.     Plan: [x] Continue per plan of care.    [x] Other: Consider DC of e-stim if unsuccessful, d/t decreased sensation & peripheral neuropathy, err on the side of caution    Time In:10:50 am           Time Out: 12 pm     Electronically signed by:  Luis Dobson PTA

## 2019-08-28 ENCOUNTER — HOSPITAL ENCOUNTER (OUTPATIENT)
Dept: OCCUPATIONAL THERAPY | Age: 74
Setting detail: THERAPIES SERIES
Discharge: HOME | End: 2019-08-28
Payer: COMMERCIAL

## 2019-08-28 PROCEDURE — 97537 COMMUNITY/WORK REINTEGRATION: CPT

## 2019-08-29 ENCOUNTER — HOSPITAL ENCOUNTER (OUTPATIENT)
Dept: PHYSICAL THERAPY | Facility: CLINIC | Age: 74
Setting detail: THERAPIES SERIES
Discharge: HOME | End: 2019-08-29
Payer: COMMERCIAL

## 2019-08-29 PROCEDURE — 97112 NEUROMUSCULAR REEDUCATION: CPT

## 2019-08-29 PROCEDURE — 97110 THERAPEUTIC EXERCISES: CPT

## 2019-08-29 NOTE — FLOWSHEET NOTE
[] [facility]) [facility] &  Therapy  [address]  [phone]  F: [phone] [x] [address]  [place] [address]   [address]  P: [phone]  F: [phone] [] [address]  [address] [facility]  P: [phone]  F: [phone] [] [address]  [facility]   [address]  [place]: [phone]  F: [phone]     Physical Therapy Daily Treatment Note    Date:  2019  Patient Name:  Tati Francois    :  1945  MRN: [id number]  Physician: Stacy Sarah PA-C   Insurance: [facility] after evaluation)  Medical Diagnosis: R26.9 (ICD-10-CM) - Gait disorder; M21.371 (ICD-10-CM) - Foot drop, right  Rehab Codes: R26.2NEC, R29.6, Z91.81, M62.81, R29.3, M79.1, G25.2, M25.671, M25.672, Z96.653  Onset date:                                 Next 's appt: 10/31/19  Visit# / total visits:    Cancels/No Shows: 0/0    Subjective:    Pain:  [x] Yes  [] No Location: B knees, B ankles Pain Rating: (0-10 scale) 2/10 knees  1-2/10 R ankle   Pain altered Tx:  [x] No  [] Yes  Action:  Comments: Patient arrives reporting driving program with OT is going well. States he does not have neuropathy symptoms all the time in his LE's.     Objective:  Modalities: ktape functional taping to R tib anterior with manual DF by PTA  Precautions: A-fib, COPD, B TKA  Exercises: Bold completed    Exercise Reps/ Time Weight/ Level Comments   Nustep   15'   L1   end session,   Gastroc stretch   3x20\"   Slant board              Seated          Piriformis stretch  HEP       Marches 15x       LAQ 15x       Heel/Toe Raises 20x       Ankle Circles 15x   CW/CCW, AA RLE   Toe Towel Scrunches 15x       Inv/Ev Towel Slide 15x HEP  AA RLE   Arch Lifts (Doming) 15x HEP     Gluteal Sets HEP       Chair push ups      Add next BLEs, throughout, by 1/2 to a full grade to ease standing, balancing & WBing activity. 3. ? ROM: B ankles by 2 degrees to improve foot clearance with gait for fall prevention. 4. Independent with Home Exercise Programs  5. Demonstrate Knowledge of fall prevention (wise performed 8/1/19 - Fall prevention information given. Pt. with good understanding).   6. Pt to report ability to sleep for >6 hrs, consistently, to improve quality of life. 7. Pt to improve 5 times sit to stand in <20seconds, without arm rests, to improve safety, gait & balance. 8. Pt to improve Tinetti Balance Score to 15 points to improve dynamic balance & improve fall risk.      LTG: (to be met in 18 treatments)  1. ?Pain: To <1-2/10 with activity to increase ease with ADLs.   2. ? Strength: BLEs, throughout, to 4+/5 to ease standing, balancing & WBing activity. 3. ? ROM: B ankles to 10 degrees to improve foot clearance with gait for fall prevention. 4. ? Function: LEFS to <20% deficit to ease ADLs & improve quality of life. 5. Pt to ambulate modified independently with RW for >800' with improved gait mechanics to improve safety & overall community navigation.    6. Pt to improve Tinetti Balance Score to >24 points to improve dynamic balance & reduce fall risk.    7. Pt to improve 5 times sit to stand in 15seconds to improve safety, gait & balance.   8. Pt to improve all transfers to modified independent to improve overall mobility, safely. 9. Pt to verbalize better 75% improvement with transitioning R foot from gas to brake pedals to improve driving function.     Patient goals:\" To gain strength in ankle for driving purposes\"    Pt. Education:  [x] Yes  [] No  [x] Reviewed Prior HEP/Ed  Method of Education: [x] Verbal  [x] Demo  [] Written  Comprehension of Education:  [x] Verbalizes understanding. [] Demonstrates understanding. [x] Needs review. [] Demonstrates/verbalizes HEP/Ed previously given.      Plan: [x] Continue per plan

## 2019-09-04 ENCOUNTER — HOSPITAL ENCOUNTER (OUTPATIENT)
Dept: OCCUPATIONAL THERAPY | Age: 74
Setting detail: THERAPIES SERIES
Discharge: HOME | End: 2019-09-04
Payer: COMMERCIAL

## 2019-09-04 PROCEDURE — 97537 COMMUNITY/WORK REINTEGRATION: CPT

## 2019-09-05 ENCOUNTER — HOSPITAL ENCOUNTER (OUTPATIENT)
Dept: PHYSICAL THERAPY | Facility: CLINIC | Age: 74
Setting detail: THERAPIES SERIES
Discharge: HOME | End: 2019-09-05
Payer: COMMERCIAL

## 2019-09-05 PROCEDURE — 97110 THERAPEUTIC EXERCISES: CPT

## 2019-09-05 PROCEDURE — 97112 NEUROMUSCULAR REEDUCATION: CPT

## 2019-09-05 NOTE — FLOWSHEET NOTE
[] [facility]) [facility] &  Therapy  [address]  [phone]  F: [phone] [x] [address]  [facility]   [address]  P: [phone]  F: [phone] [] [address]  [address] [facility]  P: [phone]  F: [phone] [] [address]  [facility]   [address]  [place]: [phone]  F: [phone]     Physical Therapy Daily Treatment Note    Date:  2019  Patient Name:  Ariel Echols    :  1945  MRN: [id number]  Physician: Katia Centeno PA-C   Insurance: [facility] after evaluation)  Medical Diagnosis: R26.9 (ICD-10-CM) - Gait disorder; M21.371 (ICD-10-CM) - Foot drop, right  Rehab Codes: R26.2NEC, R29.6, Z91.81, M62.81, R29.3, M79.1, G25.2, M25.671, M25.672, Z96.653  Onset date:                                 Next 's appt: 10/31/19  Visit# / total visits:    Cancels/No Shows: 0/0    Subjective:    Pain:  [x] Yes  [] No Location: B knees, B ankles Pain Rating: (0-10 scale) 2-3/10 knees  1-2/10 R ankle   Pain altered Tx:  [x] No  [] Yes  Action:  Comments: Patient reports today with increase in pain. Attributed pain increase to change in weather.       Objective:  Modalities: ktape functional taping to R tib anterior with manual DF by PTA- Held this date  Precautions: A-fib, COPD, B TKA  Exercises: Bold completed    Exercise Reps/ Time Weight/ Level Comments   Nustep 10'   L1      Gastroc stretch   3x20\"   Slant board              Seated          Piriformis stretch  HEP       Marches 15x       LAQ 15x       Heel/Toe Raises 20x       Ankle Circles 15x   CW/CCW, AA RLE   Toe Towel Scrunches 15x       Inv/Ev Towel Slide 15x HEP  AA RLE   Arch Lifts (Doming) 15x HEP     Gluteal Sets HEP       Chair push ups      Add next   Propped HS stretch HEP   On ground review. [] Demonstrates/verbalizes HEP/Ed previously given. Plan: [x] Continue per plan of care.    [x] Other: Consider DC of e-stim if unsuccessful, d/t decreased sensation & peripheral neuropathy, err on the side of caution    Time In:1100 am           Time Out: 1200 pm     Electronically signed by:  Phani Hauser PTA

## 2019-09-09 ENCOUNTER — HOSPITAL ENCOUNTER (OUTPATIENT)
Dept: OCCUPATIONAL THERAPY | Age: 74
Setting detail: THERAPIES SERIES
Discharge: HOME | End: 2019-09-09
Payer: COMMERCIAL

## 2019-09-09 PROCEDURE — 97537 COMMUNITY/WORK REINTEGRATION: CPT

## 2019-09-09 NOTE — PROGRESS NOTES
crossover signal, versus using right lower extremity. If patient decides to pursue the use of hand controls, further training is required and patient will be required to re-test using his modified vehicle with the State of [employer]. Patient reporting that he plans to pursue hand controls. Will determine if patient's insurance company will approve training with use of hand controls since traditional driving using his right lower extremity is not considered to be safe due to limited progress in outpatient physical therapy that is addressing his right lower extremity strength and coordination. If patient's insurance company approves training using hand controls, will contact physician to request new order.

## 2019-09-12 ENCOUNTER — HOSPITAL ENCOUNTER (OUTPATIENT)
Dept: PHYSICAL THERAPY | Facility: CLINIC | Age: 74
Setting detail: THERAPIES SERIES
Discharge: HOME | End: 2019-09-12
Payer: COMMERCIAL

## 2019-09-12 PROCEDURE — 97112 NEUROMUSCULAR REEDUCATION: CPT

## 2019-09-12 PROCEDURE — 97110 THERAPEUTIC EXERCISES: CPT

## 2019-09-12 NOTE — FLOWSHEET NOTE
be met in 10 treatments)  1. ? Pain: To < 3/10 with activity to increase ease with ADLs. 2. ? Strength: BLEs, throughout, by 1/2 to a full grade to ease standing, balancing & WBing activity. 3. ? ROM: B ankles by 2 degrees to improve foot clearance with gait for fall prevention. 4. Independent with Home Exercise Programs  5. Demonstrate Knowledge of fall prevention (billie performed 8/1/19 - Fall prevention information given. Pt. with good understanding).   6. Pt to report ability to sleep for >6 hrs, consistently, to improve quality of life. 7. Pt to improve 5 times sit to stand in <20seconds, without arm rests, to improve safety, gait & balance. 8. Pt to improve Tinetti Balance Score to 15 points to improve dynamic balance & improve fall risk.      LTG: (to be met in 18 treatments)  1. ?Pain: To <1-2/10 with activity to increase ease with ADLs.   2. ? Strength: BLEs, throughout, to 4+/5 to ease standing, balancing & WBing activity. 3. ? ROM: B ankles to 10 degrees to improve foot clearance with gait for fall prevention. 4. ? Function: LEFS to <20% deficit to ease ADLs & improve quality of life. 5. Pt to ambulate modified independently with RW for >800' with improved gait mechanics to improve safety & overall community navigation.    6. Pt to improve Tinetti Balance Score to >24 points to improve dynamic balance & reduce fall risk.    7. Pt to improve 5 times sit to stand in 15seconds to improve safety, gait & balance.   8. Pt to improve all transfers to modified independent to improve overall mobility, safely. 9. Pt to verbalize better 75% improvement with transitioning R foot from gas to brake pedals to improve driving function.     Patient goals:\" To gain strength in ankle for driving purposes\"    Pt. Education:  [x] Yes  [] No  [x] Reviewed Prior HEP/Ed  Method of Education: [x] Verbal  [x] Demo  [] Written  Comprehension of Education:  [x] Verbalizes understanding.   [] Demonstrates

## 2019-09-19 ENCOUNTER — HOSPITAL ENCOUNTER (OUTPATIENT)
Dept: PHYSICAL THERAPY | Facility: CLINIC | Age: 74
Setting detail: THERAPIES SERIES
Discharge: HOME | End: 2019-09-19
Payer: COMMERCIAL

## 2019-09-19 PROCEDURE — 97112 NEUROMUSCULAR REEDUCATION: CPT

## 2019-09-19 PROCEDURE — 97110 THERAPEUTIC EXERCISES: CPT

## 2019-09-19 RX ORDER — CETIRIZINE HYDROCHLORIDE 10 MG/1
10 TABLET ORAL DAILY
Qty: 30 TABLET | Refills: 3 | Status: SHIPPED | OUTPATIENT
Start: 2019-09-19 | End: 2019-12-11 | Stop reason: SDUPTHER

## 2019-09-19 RX ORDER — BUMETANIDE 1 MG/1
1 TABLET ORAL 2 TIMES DAILY
Qty: 60 TABLET | Refills: 3 | Status: SHIPPED | OUTPATIENT
Start: 2019-09-19 | End: 2019-12-14 | Stop reason: SDUPTHER

## 2019-09-19 RX ORDER — POTASSIUM CHLORIDE 20 MEQ/1
20 TABLET, EXTENDED RELEASE ORAL 2 TIMES DAILY
Qty: 60 TABLET | Refills: 3 | Status: SHIPPED | OUTPATIENT
Start: 2019-09-19 | End: 2019-12-14 | Stop reason: SDUPTHER

## 2019-09-19 NOTE — FLOWSHEET NOTE
Propped HS stretch HEP   On ground   Hip ABD/ADD   20x Ball/band Gold/black band                 Steps      Progressed reps 9/12   Step to 15x  6\"    Up 15x 6\"      down         Quick taps - B 15x 6\" Added 9/19             Standing     //bars   3 way hip  15x  3#  Added weight 8/22 increased weight 9/12 Marches 15x  3# \"   Tandem stance 10\"x2 ea   Added 8/14 L in front harder   EO NBOS 2x45\"    Progressed 8/22   EO NBOS head turns 3x30\"  Added 8/29   EC NBOS  3x40\"    Progressed 8/22   EO WBOS on foam  3x30\"  Added 8/22   Reaching + NBOS  3x20\"       Reaching/cone stacking & NBOS 10cones  x2 ea Foam/cone Added 9/19   Standing gas to break exercises 20x 3# Added 8/22 Increased weight 9/12   Cone taps 20x 1 cone //bars; added 9/19/19   Cone taps - 2 cones 20x 2 cones //bar         Dynamic Balance Act.         Obstacle course         Rebounder - ball toss     Large ball    Gait training  Remi Juanita RW, cueing for step through gate and decreased weight through UE's    Other:      Consider DC of e-stim if unsuccessful, d/t decreased sensation & peripheral neuropathy, err on the side of caution  [place] estim: 10' sitting with LE extended on stool. Patient watching estim mA climb due to decreased sensation. 17mA and 20/10 with 3 second ramp       Specific Instructions for next treatment:, static & dynamic balance training, gait & safety/transfer training, BLE strength & flexibility ex; progress as tolerated, modalities prn    Treatment Charges: Mins Units   []  Modalities:estim     [x]  Ther Exercise 35 2   []  Manual Therapy     []  Ther Activities     []  Aquatics     []  Vasocompression     [x]  Other- Neuro re-ed 10 1   Total Treatment time 45 3   2 TE 1 neuro  $80.07  Estimated Medicare Costs as of 9/19/19: $691.19    Assessment: [x] Progressing toward goals. Pt with good tolerance to charted therex & theract. Able to progress with increased weight and time with balance activities.  Attempted VMS with

## 2019-09-19 NOTE — TELEPHONE ENCOUNTER
LOV 6-27-19  LRF Bumex & Potassium 6-4-19, Cetirizine 6-27-19    Health Maintenance   Topic Date Due    Shingles Vaccine (1 of 2) 10/31/1996    Annual Wellness Visit (AWV)  06/19/2019    Pneumococcal 65+ years Vaccine (2 of 2 - PPSV23) 08/17/2019    Flu vaccine (1) 09/01/2019    Potassium monitoring  03/12/2020    Creatinine monitoring  03/12/2020    Colon cancer screen colonoscopy  03/26/2023    Lipid screen  06/24/2024    DTaP/Tdap/Td vaccine (2 - Td) 04/22/2029    AAA screen  Completed    Hepatitis C screen  Completed             (applicable per patient's age: Cancer Screenings, Depression Screening, Fall Risk Screening, Immunizations)    LDL Cholesterol (mg/dL)   Date Value   06/24/2019 65     LDL Calculated (mg/dL)   Date Value   10/30/2013 67     AST (U/L)   Date Value   06/24/2019 25     ALT (U/L)   Date Value   06/24/2019 13     BUN (mg/dL)   Date Value   03/12/2019 15      (goal A1C is < 7)   (goal LDL is <100) need 30-50% reduction from baseline     BP Readings from Last 3 Encounters:   06/27/19 123/76   04/22/19 121/78   03/22/19 (!) 111/58    (goal /80)      All Future Testing planned in [facility]:  Lab Frequency Next Occurrence       Next Visit Date:  Future Appointments   Date Time Provider Jeri Flynn   9/19/2019 12:45 PM Courtney Parham, PT [facility]   9/26/2019 12:00 PM Courtney Parham, PT [facility]   10/3/2019 11:00 AM Gilson Trinidad, PTA [facility]   10/10/2019 11:00 AM Gilson Trinidad, PTA [facility]   10/17/2019 11:00 AM Courtney Parham, PT [facility]   10/31/2019  2:20 PM A[facility]            Patient Active Problem List:     Atrial fibrillation (HCC)     Hyperlipidemia     GERD (gastroesophageal reflux disease)     Osteoarthritis of lumbar spine     OA (osteoarthritis) of knee, bilateral     Foot drop, right     Impaired hearing     Chronic rhinosinusitis     Lymphedema of both lower extremities     Hallux valgus, acquired, bilateral     Gait disorder     S/P revision of total knee     Fracture of femur ([facility] [address]     Hypertension     Slow transit constipation     Internal hemorrhoids     Calculus of gallbladder without cholecystitis without obstruction     MARIAMA on CPAP     Chronic diastolic heart failure (HCC)     Class 2 severe obesity due to excess calories with serious comorbidity and body mass index (BMI) of 36.0 to 36.9 in adult [facility])     Simple chronic bronchitis (HCC)     Acute superficial gastritis without hemorrhage     Gastric out let obstruction     Anastomotic stricture of stomach     Nausea & vomiting

## 2019-09-25 ENCOUNTER — APPOINTMENT (OUTPATIENT)
Dept: GENERAL RADIOLOGY | Age: 74
DRG: 872 | End: 2019-09-25
Payer: COMMERCIAL

## 2019-09-25 ENCOUNTER — HOSPITAL ENCOUNTER (INPATIENT)
Age: 74
LOS: 3 days | Discharge: HOME | DRG: 872 | End: 2019-09-28
Attending: EMERGENCY MEDICINE | Admitting: INTERNAL MEDICINE
Payer: COMMERCIAL

## 2019-09-25 ENCOUNTER — APPOINTMENT (OUTPATIENT)
Dept: CT IMAGING | Age: 74
DRG: 872 | End: 2019-09-25
Payer: COMMERCIAL

## 2019-09-25 DIAGNOSIS — A41.9 SEPSIS, DUE TO UNSPECIFIED ORGANISM: Primary | ICD-10-CM

## 2019-09-25 PROBLEM — N39.0 SEPSIS SECONDARY TO UTI (HCC): Status: ACTIVE | Noted: 2019-09-25

## 2019-09-25 LAB
-: ABNORMAL
ABSOLUTE EOS #: 0 K/UL (ref 0–0.4)
ABSOLUTE IMMATURE GRANULOCYTE: 0 K/UL (ref 0–0.3)
ABSOLUTE LYMPH #: 0.5 K/UL (ref 1–4.8)
ABSOLUTE MONO #: 0.71 K/UL (ref 0.1–0.8)
ALBUMIN SERPL-MCNC: 4.1 G/DL (ref 3.5–5.2)
ALBUMIN/GLOBULIN RATIO: 1.2 (ref 1–2.5)
ALP BLD-CCNC: 120 U/L (ref 40–129)
ALT SERPL-CCNC: 10 U/L (ref 5–41)
AMORPHOUS: ABNORMAL
ANION GAP SERPL CALCULATED.3IONS-SCNC: 12 MMOL/L (ref 9–17)
AST SERPL-CCNC: 19 U/L
BACTERIA: ABNORMAL
BASOPHILS # BLD: 1 % (ref 0–2)
BASOPHILS ABSOLUTE: 0.07 K/UL (ref 0–0.2)
BILIRUB SERPL-MCNC: 2.15 MG/DL (ref 0.3–1.2)
BILIRUBIN URINE: NEGATIVE
BNP INTERPRETATION: ABNORMAL
BUN BLDV-MCNC: 17 MG/DL (ref 8–23)
BUN/CREAT BLD: ABNORMAL (ref 9–20)
CALCIUM SERPL-MCNC: 9.2 MG/DL (ref 8.6–10.4)
CASTS UA: ABNORMAL /LPF (ref 0–2)
CASTS UA: ABNORMAL /LPF (ref 0–2)
CHLORIDE BLD-SCNC: 98 MMOL/L (ref 98–107)
CO2: 25 MMOL/L (ref 20–31)
COLOR: ABNORMAL
CREAT SERPL-MCNC: 1.01 MG/DL (ref 0.7–1.2)
CRYSTALS, UA: ABNORMAL /HPF
DIFFERENTIAL TYPE: ABNORMAL
EOSINOPHILS RELATIVE PERCENT: 0 % (ref 1–4)
EPITHELIAL CELLS UA: ABNORMAL /HPF (ref 0–5)
GFR AFRICAN AMERICAN: >60 ML/MIN
GFR NON-AFRICAN AMERICAN: >60 ML/MIN
GFR SERPL CREATININE-BSD FRML MDRD: ABNORMAL ML/MIN/{1.73_M2}
GFR SERPL CREATININE-BSD FRML MDRD: ABNORMAL ML/MIN/{1.73_M2}
GLUCOSE BLD-MCNC: 131 MG/DL (ref 70–99)
GLUCOSE URINE: NEGATIVE
HCT VFR BLD CALC: 36.8 % (ref 40.7–50.3)
HEMOGLOBIN: 11.4 G/DL (ref 13–17)
IMMATURE GRANULOCYTES: 0 %
INR BLD: 1.3
KETONES, URINE: NEGATIVE
LACTIC ACID, SEPSIS WHOLE BLOOD: 1.8 MMOL/L (ref 0.5–1.9)
LACTIC ACID, SEPSIS WHOLE BLOOD: 2.1 MMOL/L (ref 0.5–1.9)
LACTIC ACID, SEPSIS: ABNORMAL MMOL/L (ref 0.5–1.9)
LACTIC ACID, SEPSIS: NORMAL MMOL/L (ref 0.5–1.9)
LEUKOCYTE ESTERASE, URINE: ABNORMAL
LYMPHOCYTES # BLD: 7 % (ref 24–44)
MCH RBC QN AUTO: 30.6 PG (ref 25.2–33.5)
MCHC RBC AUTO-ENTMCNC: 31 G/DL (ref 28.4–34.8)
MCV RBC AUTO: 98.7 FL (ref 82.6–102.9)
MONOCYTES # BLD: 10 % (ref 1–7)
MORPHOLOGY: NORMAL
MUCUS: ABNORMAL
NITRITE, URINE: NEGATIVE
NRBC AUTOMATED: 0 PER 100 WBC
OTHER OBSERVATIONS UA: ABNORMAL
PARTIAL THROMBOPLASTIN TIME: 27.8 SEC (ref 20.5–30.5)
PDW BLD-RTO: 14.8 % (ref 11.8–14.4)
PH UA: 6 (ref 5–8)
PLATELET # BLD: 187 K/UL (ref 138–453)
PLATELET ESTIMATE: ABNORMAL
PMV BLD AUTO: 9.1 FL (ref 8.1–13.5)
POTASSIUM SERPL-SCNC: 3.8 MMOL/L (ref 3.7–5.3)
PRO-BNP: 2568 PG/ML
PROCALCITONIN: 0.09 NG/ML
PROTEIN UA: ABNORMAL
PROTHROMBIN TIME: 13.6 SEC (ref 9–12)
RBC # BLD: 3.73 M/UL (ref 4.21–5.77)
RBC # BLD: ABNORMAL 10*6/UL
RBC UA: ABNORMAL /HPF (ref 0–2)
RENAL EPITHELIAL, UA: ABNORMAL /HPF
SEG NEUTROPHILS: 82 % (ref 36–66)
SEGMENTED NEUTROPHILS ABSOLUTE COUNT: 5.82 K/UL (ref 1.8–7.7)
SODIUM BLD-SCNC: 135 MMOL/L (ref 135–144)
SPECIFIC GRAVITY UA: 1.01 (ref 1–1.03)
TOTAL PROTEIN: 7.5 G/DL (ref 6.4–8.3)
TRICHOMONAS: ABNORMAL
TROPONIN INTERP: ABNORMAL
TROPONIN INTERP: ABNORMAL
TROPONIN T: ABNORMAL NG/ML
TROPONIN T: ABNORMAL NG/ML
TROPONIN, HIGH SENSITIVITY: 56 NG/L (ref 0–22)
TROPONIN, HIGH SENSITIVITY: 57 NG/L (ref 0–22)
TURBIDITY: ABNORMAL
URINE HGB: ABNORMAL
UROBILINOGEN, URINE: NORMAL
WBC # BLD: 7.1 K/UL (ref 3.5–11.3)
WBC # BLD: ABNORMAL 10*3/UL
WBC UA: ABNORMAL /HPF (ref 0–5)
YEAST: ABNORMAL

## 2019-09-25 PROCEDURE — 87086 URINE CULTURE/COLONY COUNT: CPT

## 2019-09-25 PROCEDURE — 71045 X-RAY EXAM CHEST 1 VIEW: CPT

## 2019-09-25 PROCEDURE — 83605 ASSAY OF LACTIC ACID: CPT

## 2019-09-25 PROCEDURE — 51701 INSERT BLADDER CATHETER: CPT

## 2019-09-25 PROCEDURE — 87088 URINE BACTERIA CULTURE: CPT

## 2019-09-25 PROCEDURE — 93005 ELECTROCARDIOGRAM TRACING: CPT | Performed by: STUDENT IN AN ORGANIZED HEALTH CARE EDUCATION/TRAINING PROGRAM

## 2019-09-25 PROCEDURE — 96365 THER/PROPH/DIAG IV INF INIT: CPT

## 2019-09-25 PROCEDURE — 85730 THROMBOPLASTIN TIME PARTIAL: CPT

## 2019-09-25 PROCEDURE — 85025 COMPLETE CBC W/AUTO DIFF WBC: CPT

## 2019-09-25 PROCEDURE — 6360000002 HC RX W HCPCS: Performed by: STUDENT IN AN ORGANIZED HEALTH CARE EDUCATION/TRAINING PROGRAM

## 2019-09-25 PROCEDURE — 6370000000 HC RX 637 (ALT 250 FOR IP): Performed by: NURSE PRACTITIONER

## 2019-09-25 PROCEDURE — 2580000003 HC RX 258: Performed by: NURSE PRACTITIONER

## 2019-09-25 PROCEDURE — 85610 PROTHROMBIN TIME: CPT

## 2019-09-25 PROCEDURE — 84484 ASSAY OF TROPONIN QUANT: CPT

## 2019-09-25 PROCEDURE — 2580000003 HC RX 258: Performed by: STUDENT IN AN ORGANIZED HEALTH CARE EDUCATION/TRAINING PROGRAM

## 2019-09-25 PROCEDURE — 70450 CT HEAD/BRAIN W/O DYE: CPT

## 2019-09-25 PROCEDURE — 81001 URINALYSIS AUTO W/SCOPE: CPT

## 2019-09-25 PROCEDURE — 83880 ASSAY OF NATRIURETIC PEPTIDE: CPT

## 2019-09-25 PROCEDURE — 87150 DNA/RNA AMPLIFIED PROBE: CPT

## 2019-09-25 PROCEDURE — 84145 PROCALCITONIN (PCT): CPT

## 2019-09-25 PROCEDURE — 99223 1ST HOSP IP/OBS HIGH 75: CPT | Performed by: INTERNAL MEDICINE

## 2019-09-25 PROCEDURE — 87186 SC STD MICRODIL/AGAR DIL: CPT

## 2019-09-25 PROCEDURE — 2060000000 HC ICU INTERMEDIATE R&B

## 2019-09-25 PROCEDURE — 87040 BLOOD CULTURE FOR BACTERIA: CPT

## 2019-09-25 PROCEDURE — 6370000000 HC RX 637 (ALT 250 FOR IP): Performed by: STUDENT IN AN ORGANIZED HEALTH CARE EDUCATION/TRAINING PROGRAM

## 2019-09-25 PROCEDURE — 87205 SMEAR GRAM STAIN: CPT

## 2019-09-25 PROCEDURE — 2580000003 HC RX 258: Performed by: INTERNAL MEDICINE

## 2019-09-25 PROCEDURE — 80053 COMPREHEN METABOLIC PANEL: CPT

## 2019-09-25 PROCEDURE — 99285 EMERGENCY DEPT VISIT HI MDM: CPT

## 2019-09-25 RX ORDER — NICOTINE 21 MG/24HR
1 PATCH, TRANSDERMAL 24 HOURS TRANSDERMAL DAILY PRN
Status: DISCONTINUED | OUTPATIENT
Start: 2019-09-25 | End: 2019-09-28 | Stop reason: HOSPADM

## 2019-09-25 RX ORDER — SODIUM CHLORIDE, SODIUM LACTATE, POTASSIUM CHLORIDE, AND CALCIUM CHLORIDE .6; .31; .03; .02 G/100ML; G/100ML; G/100ML; G/100ML
30 INJECTION, SOLUTION INTRAVENOUS ONCE
Status: COMPLETED | OUTPATIENT
Start: 2019-09-25 | End: 2019-09-25

## 2019-09-25 RX ORDER — ONDANSETRON 2 MG/ML
4 INJECTION INTRAMUSCULAR; INTRAVENOUS ONCE
Status: COMPLETED | OUTPATIENT
Start: 2019-09-25 | End: 2019-09-25

## 2019-09-25 RX ORDER — SODIUM CHLORIDE 9 MG/ML
INJECTION, SOLUTION INTRAVENOUS CONTINUOUS
Status: DISCONTINUED | OUTPATIENT
Start: 2019-09-25 | End: 2019-09-26

## 2019-09-25 RX ORDER — ACETAMINOPHEN 500 MG
1000 TABLET ORAL ONCE
Status: COMPLETED | OUTPATIENT
Start: 2019-09-25 | End: 2019-09-25

## 2019-09-25 RX ORDER — SODIUM CHLORIDE 0.9 % (FLUSH) 0.9 %
10 SYRINGE (ML) INJECTION EVERY 12 HOURS SCHEDULED
Status: DISCONTINUED | OUTPATIENT
Start: 2019-09-25 | End: 2019-09-28 | Stop reason: HOSPADM

## 2019-09-25 RX ORDER — SODIUM CHLORIDE 0.9 % (FLUSH) 0.9 %
10 SYRINGE (ML) INJECTION PRN
Status: DISCONTINUED | OUTPATIENT
Start: 2019-09-25 | End: 2019-09-28 | Stop reason: HOSPADM

## 2019-09-25 RX ORDER — 0.9 % SODIUM CHLORIDE 0.9 %
1000 INTRAVENOUS SOLUTION INTRAVENOUS ONCE
Status: COMPLETED | OUTPATIENT
Start: 2019-09-25 | End: 2019-09-26

## 2019-09-25 RX ORDER — ONDANSETRON 2 MG/ML
4 INJECTION INTRAMUSCULAR; INTRAVENOUS EVERY 6 HOURS PRN
Status: DISCONTINUED | OUTPATIENT
Start: 2019-09-25 | End: 2019-09-28 | Stop reason: HOSPADM

## 2019-09-25 RX ORDER — ACETAMINOPHEN 325 MG/1
650 TABLET ORAL EVERY 4 HOURS PRN
Status: DISCONTINUED | OUTPATIENT
Start: 2019-09-25 | End: 2019-09-28 | Stop reason: HOSPADM

## 2019-09-25 RX ADMIN — SODIUM CHLORIDE, POTASSIUM CHLORIDE, SODIUM LACTATE AND CALCIUM CHLORIDE 2052 ML: 600; 310; 30; 20 INJECTION, SOLUTION INTRAVENOUS at 15:08

## 2019-09-25 RX ADMIN — ACETAMINOPHEN 1000 MG: 500 TABLET ORAL at 15:35

## 2019-09-25 RX ADMIN — ONDANSETRON 4 MG: 2 INJECTION INTRAMUSCULAR; INTRAVENOUS at 17:22

## 2019-09-25 RX ADMIN — CEFTRIAXONE SODIUM 1 G: 1 INJECTION, POWDER, FOR SOLUTION INTRAMUSCULAR; INTRAVENOUS at 16:23

## 2019-09-25 RX ADMIN — ONDANSETRON 4 MG: 2 INJECTION INTRAMUSCULAR; INTRAVENOUS at 18:50

## 2019-09-25 RX ADMIN — SODIUM CHLORIDE 1000 ML: 9 INJECTION, SOLUTION INTRAVENOUS at 22:19

## 2019-09-25 RX ADMIN — SODIUM CHLORIDE, PRESERVATIVE FREE 10 ML: 5 INJECTION INTRAVENOUS at 22:35

## 2019-09-25 RX ADMIN — ACETAMINOPHEN 650 MG: 325 TABLET ORAL at 22:32

## 2019-09-25 ASSESSMENT — PAIN SCALES - GENERAL
PAINLEVEL_OUTOF10: 0
PAINLEVEL_OUTOF10: 2
PAINLEVEL_OUTOF10: 3

## 2019-09-25 ASSESSMENT — ENCOUNTER SYMPTOMS
SORE THROAT: 0
COLOR CHANGE: 1
SINUS PRESSURE: 0
COUGH: 1
DIARRHEA: 0
NAUSEA: 1
BACK PAIN: 0
RHINORRHEA: 1
VOMITING: 1
CHEST TIGHTNESS: 0
CONSTIPATION: 0

## 2019-09-25 ASSESSMENT — PAIN DESCRIPTION - LOCATION: LOCATION: ABDOMEN

## 2019-09-25 ASSESSMENT — PAIN DESCRIPTION - DESCRIPTORS: DESCRIPTORS: ACHING

## 2019-09-25 NOTE — FLOWSHEET NOTE
[] [address]  [address]    [phone]  F: [phone]   [x] [address]   [address]  P: [phone]  F: [phone]  [] [address]  [address] [facility]  P: [phone]  F: [phone]  [] [address]  [facility]   [address]   [place]: [phone]  F: [phone]   [] [place] [address] [facility] [address]  [place]: [phone]   F: [phone]     Physical Therapy Cancel/No Show note    Date: 2019  Patient: Vitor Rachel  : 1945  MRN: [id number]    Cancels/No Shows to date:     For today's appointment patient:    [x]  Cancelled    [] Rescheduled appointment    [] No-show     Reason given by patient:    []  Patient ill    []  Conflicting appointment    [] No transportation      [] Conflict with work    [] No reason given    [] Weather related    [x] Other:      Comments: Patient's wife called to take patient off of the schedule as he was admitted to the hospital for a UTI this date. Patient or his wife will call if he cannot make his 10/10/19 appointment.         [x] Next appointment was confirmed    Electronically signed by: Keith Earl PTA

## 2019-09-26 ENCOUNTER — HOSPITAL ENCOUNTER (OUTPATIENT)
Dept: PHYSICAL THERAPY | Facility: CLINIC | Age: 74
Setting detail: THERAPIES SERIES
Discharge: HOME | End: 2019-09-26
Payer: COMMERCIAL

## 2019-09-26 LAB
ANION GAP SERPL CALCULATED.3IONS-SCNC: 11 MMOL/L (ref 9–17)
BNP INTERPRETATION: ABNORMAL
BUN BLDV-MCNC: 19 MG/DL (ref 8–23)
BUN/CREAT BLD: ABNORMAL (ref 9–20)
CALCIUM SERPL-MCNC: 8.3 MG/DL (ref 8.6–10.4)
CHLORIDE BLD-SCNC: 102 MMOL/L (ref 98–107)
CO2: 23 MMOL/L (ref 20–31)
CREAT SERPL-MCNC: 0.89 MG/DL (ref 0.7–1.2)
EKG ATRIAL RATE: 84 BPM
EKG Q-T INTERVAL: 424 MS
EKG QRS DURATION: 140 MS
EKG QTC CALCULATION (BAZETT): 457 MS
EKG R AXIS: 158 DEGREES
EKG T AXIS: -14 DEGREES
EKG VENTRICULAR RATE: 70 BPM
GFR AFRICAN AMERICAN: >60 ML/MIN
GFR NON-AFRICAN AMERICAN: >60 ML/MIN
GFR SERPL CREATININE-BSD FRML MDRD: ABNORMAL ML/MIN/{1.73_M2}
GFR SERPL CREATININE-BSD FRML MDRD: ABNORMAL ML/MIN/{1.73_M2}
GLUCOSE BLD-MCNC: 117 MG/DL (ref 70–99)
HCT VFR BLD CALC: 31 % (ref 40.7–50.3)
HEMOGLOBIN: 9.4 G/DL (ref 13–17)
INR BLD: 1.4
MCH RBC QN AUTO: 30.3 PG (ref 25.2–33.5)
MCHC RBC AUTO-ENTMCNC: 30.3 G/DL (ref 28.4–34.8)
MCV RBC AUTO: 100 FL (ref 82.6–102.9)
NRBC AUTOMATED: 0 PER 100 WBC
PDW BLD-RTO: 15.1 % (ref 11.8–14.4)
PLATELET # BLD: 120 K/UL (ref 138–453)
PMV BLD AUTO: 9.5 FL (ref 8.1–13.5)
POTASSIUM SERPL-SCNC: 4.1 MMOL/L (ref 3.7–5.3)
PRO-BNP: 9060 PG/ML
PROTHROMBIN TIME: 14.4 SEC (ref 9–12)
RBC # BLD: 3.1 M/UL (ref 4.21–5.77)
SODIUM BLD-SCNC: 136 MMOL/L (ref 135–144)
WBC # BLD: 7.8 K/UL (ref 3.5–11.3)

## 2019-09-26 PROCEDURE — 36415 COLL VENOUS BLD VENIPUNCTURE: CPT

## 2019-09-26 PROCEDURE — 83880 ASSAY OF NATRIURETIC PEPTIDE: CPT

## 2019-09-26 PROCEDURE — 85027 COMPLETE CBC AUTOMATED: CPT

## 2019-09-26 PROCEDURE — 99222 1ST HOSP IP/OBS MODERATE 55: CPT | Performed by: INTERNAL MEDICINE

## 2019-09-26 PROCEDURE — 6370000000 HC RX 637 (ALT 250 FOR IP): Performed by: NURSE PRACTITIONER

## 2019-09-26 PROCEDURE — 6370000000 HC RX 637 (ALT 250 FOR IP): Performed by: INTERNAL MEDICINE

## 2019-09-26 PROCEDURE — 2580000003 HC RX 258: Performed by: INTERNAL MEDICINE

## 2019-09-26 PROCEDURE — 6360000002 HC RX W HCPCS: Performed by: INTERNAL MEDICINE

## 2019-09-26 PROCEDURE — 80048 BASIC METABOLIC PNL TOTAL CA: CPT

## 2019-09-26 PROCEDURE — 99232 SBSQ HOSP IP/OBS MODERATE 35: CPT | Performed by: INTERNAL MEDICINE

## 2019-09-26 PROCEDURE — 93010 ELECTROCARDIOGRAM REPORT: CPT | Performed by: INTERNAL MEDICINE

## 2019-09-26 PROCEDURE — 2060000000 HC ICU INTERMEDIATE R&B

## 2019-09-26 PROCEDURE — 85610 PROTHROMBIN TIME: CPT

## 2019-09-26 RX ORDER — GABAPENTIN 300 MG/1
300 CAPSULE ORAL 3 TIMES DAILY
Status: DISCONTINUED | OUTPATIENT
Start: 2019-09-26 | End: 2019-09-28 | Stop reason: HOSPADM

## 2019-09-26 RX ORDER — ROPINIROLE 0.25 MG/1
0.25 TABLET, FILM COATED ORAL 2 TIMES DAILY
Status: DISCONTINUED | OUTPATIENT
Start: 2019-09-26 | End: 2019-09-28 | Stop reason: HOSPADM

## 2019-09-26 RX ORDER — METOPROLOL SUCCINATE 25 MG/1
25 TABLET, EXTENDED RELEASE ORAL DAILY
Status: DISCONTINUED | OUTPATIENT
Start: 2019-09-26 | End: 2019-09-28 | Stop reason: HOSPADM

## 2019-09-26 RX ORDER — TIZANIDINE 4 MG/1
4 TABLET ORAL DAILY PRN
Status: DISCONTINUED | OUTPATIENT
Start: 2019-09-26 | End: 2019-09-27

## 2019-09-26 RX ORDER — METOCLOPRAMIDE HYDROCHLORIDE 5 MG/5ML
5 SOLUTION ORAL
Status: DISCONTINUED | OUTPATIENT
Start: 2019-09-26 | End: 2019-09-28 | Stop reason: HOSPADM

## 2019-09-26 RX ORDER — PANTOPRAZOLE SODIUM 40 MG/1
40 TABLET, DELAYED RELEASE ORAL DAILY
Status: DISCONTINUED | OUTPATIENT
Start: 2019-09-26 | End: 2019-09-28 | Stop reason: HOSPADM

## 2019-09-26 RX ADMIN — SODIUM CHLORIDE, PRESERVATIVE FREE 10 ML: 5 INJECTION INTRAVENOUS at 20:50

## 2019-09-26 RX ADMIN — METOPROLOL SUCCINATE 25 MG: 25 TABLET, FILM COATED, EXTENDED RELEASE ORAL at 13:17

## 2019-09-26 RX ADMIN — PANTOPRAZOLE SODIUM 40 MG: 40 TABLET, DELAYED RELEASE ORAL at 13:16

## 2019-09-26 RX ADMIN — GABAPENTIN 300 MG: 300 CAPSULE ORAL at 20:49

## 2019-09-26 RX ADMIN — METOCLOPRAMIDE HYDROCHLORIDE 5 MG: 5 SOLUTION ORAL at 20:49

## 2019-09-26 RX ADMIN — METOCLOPRAMIDE HYDROCHLORIDE 5 MG: 5 SOLUTION ORAL at 13:16

## 2019-09-26 RX ADMIN — ONDANSETRON 4 MG: 2 INJECTION INTRAMUSCULAR; INTRAVENOUS at 07:13

## 2019-09-26 RX ADMIN — GABAPENTIN 300 MG: 300 CAPSULE ORAL at 13:16

## 2019-09-26 RX ADMIN — ACETAMINOPHEN 650 MG: 325 TABLET ORAL at 20:49

## 2019-09-26 RX ADMIN — CEFTRIAXONE SODIUM 1 G: 1 INJECTION, POWDER, FOR SOLUTION INTRAMUSCULAR; INTRAVENOUS at 17:11

## 2019-09-26 RX ADMIN — ENOXAPARIN SODIUM 40 MG: 40 INJECTION SUBCUTANEOUS at 10:18

## 2019-09-26 RX ADMIN — SODIUM CHLORIDE: 9 INJECTION, SOLUTION INTRAVENOUS at 00:39

## 2019-09-26 RX ADMIN — SODIUM CHLORIDE, PRESERVATIVE FREE 10 ML: 5 INJECTION INTRAVENOUS at 10:19

## 2019-09-26 RX ADMIN — ROPINIROLE HYDROCHLORIDE 0.25 MG: 0.25 TABLET, FILM COATED ORAL at 13:17

## 2019-09-26 RX ADMIN — METOCLOPRAMIDE HYDROCHLORIDE 5 MG: 5 SOLUTION ORAL at 17:12

## 2019-09-26 RX ADMIN — ROPINIROLE HYDROCHLORIDE 0.25 MG: 0.25 TABLET, FILM COATED ORAL at 20:49

## 2019-09-26 ASSESSMENT — ENCOUNTER SYMPTOMS
NAUSEA: 0
ABDOMINAL PAIN: 1
DIARRHEA: 0
BACK PAIN: 1
SHORTNESS OF BREATH: 0
COLOR CHANGE: 0
CHEST TIGHTNESS: 0

## 2019-09-27 ENCOUNTER — APPOINTMENT (OUTPATIENT)
Dept: CT IMAGING | Age: 74
DRG: 872 | End: 2019-09-27
Payer: COMMERCIAL

## 2019-09-27 LAB
CULTURE: ABNORMAL
INTERVENTION: NORMAL
Lab: ABNORMAL
SPECIMEN DESCRIPTION: ABNORMAL

## 2019-09-27 PROCEDURE — 99232 SBSQ HOSP IP/OBS MODERATE 35: CPT | Performed by: INTERNAL MEDICINE

## 2019-09-27 PROCEDURE — 2580000003 HC RX 258: Performed by: INTERNAL MEDICINE

## 2019-09-27 PROCEDURE — 6370000000 HC RX 637 (ALT 250 FOR IP): Performed by: STUDENT IN AN ORGANIZED HEALTH CARE EDUCATION/TRAINING PROGRAM

## 2019-09-27 PROCEDURE — 74176 CT ABD & PELVIS W/O CONTRAST: CPT

## 2019-09-27 PROCEDURE — 6360000002 HC RX W HCPCS: Performed by: INTERNAL MEDICINE

## 2019-09-27 PROCEDURE — 94660 CPAP INITIATION&MGMT: CPT

## 2019-09-27 PROCEDURE — 2060000000 HC ICU INTERMEDIATE R&B

## 2019-09-27 PROCEDURE — 6370000000 HC RX 637 (ALT 250 FOR IP): Performed by: NURSE PRACTITIONER

## 2019-09-27 PROCEDURE — 6370000000 HC RX 637 (ALT 250 FOR IP): Performed by: INTERNAL MEDICINE

## 2019-09-27 RX ORDER — CIPROFLOXACIN 500 MG/1
500 TABLET, FILM COATED ORAL EVERY 12 HOURS SCHEDULED
Qty: 28 TABLET | Refills: 0 | Status: SHIPPED | OUTPATIENT
Start: 2019-09-27 | End: 2019-10-11

## 2019-09-27 RX ORDER — ALBUTEROL SULFATE 2.5 MG/3ML
2.5 SOLUTION RESPIRATORY (INHALATION) EVERY 6 HOURS PRN
Status: DISCONTINUED | OUTPATIENT
Start: 2019-09-27 | End: 2019-09-28 | Stop reason: HOSPADM

## 2019-09-27 RX ORDER — CIPROFLOXACIN 500 MG/1
500 TABLET, FILM COATED ORAL EVERY 12 HOURS SCHEDULED
Status: DISCONTINUED | OUTPATIENT
Start: 2019-09-27 | End: 2019-09-28 | Stop reason: HOSPADM

## 2019-09-27 RX ADMIN — ACETAMINOPHEN 650 MG: 325 TABLET ORAL at 04:09

## 2019-09-27 RX ADMIN — ENOXAPARIN SODIUM 40 MG: 40 INJECTION SUBCUTANEOUS at 10:20

## 2019-09-27 RX ADMIN — ACETAMINOPHEN 650 MG: 325 TABLET ORAL at 22:49

## 2019-09-27 RX ADMIN — ROPINIROLE HYDROCHLORIDE 0.25 MG: 0.25 TABLET, FILM COATED ORAL at 20:43

## 2019-09-27 RX ADMIN — GABAPENTIN 300 MG: 300 CAPSULE ORAL at 10:21

## 2019-09-27 RX ADMIN — METOCLOPRAMIDE HYDROCHLORIDE 5 MG: 5 SOLUTION ORAL at 20:43

## 2019-09-27 RX ADMIN — GABAPENTIN 300 MG: 300 CAPSULE ORAL at 20:43

## 2019-09-27 RX ADMIN — SODIUM CHLORIDE, PRESERVATIVE FREE 10 ML: 5 INJECTION INTRAVENOUS at 20:46

## 2019-09-27 RX ADMIN — ACETAMINOPHEN 650 MG: 325 TABLET ORAL at 16:39

## 2019-09-27 RX ADMIN — CIPROFLOXACIN 500 MG: 500 TABLET ORAL at 20:43

## 2019-09-27 RX ADMIN — PANTOPRAZOLE SODIUM 40 MG: 40 TABLET, DELAYED RELEASE ORAL at 10:21

## 2019-09-27 RX ADMIN — GABAPENTIN 300 MG: 300 CAPSULE ORAL at 15:11

## 2019-09-27 RX ADMIN — CEFTRIAXONE SODIUM 2 G: 2 INJECTION, POWDER, FOR SOLUTION INTRAMUSCULAR; INTRAVENOUS at 16:40

## 2019-09-27 RX ADMIN — METOCLOPRAMIDE HYDROCHLORIDE 5 MG: 5 SOLUTION ORAL at 08:56

## 2019-09-27 RX ADMIN — ROPINIROLE HYDROCHLORIDE 0.25 MG: 0.25 TABLET, FILM COATED ORAL at 10:21

## 2019-09-27 RX ADMIN — METOPROLOL SUCCINATE 25 MG: 25 TABLET, FILM COATED, EXTENDED RELEASE ORAL at 10:22

## 2019-09-27 RX ADMIN — SODIUM CHLORIDE, PRESERVATIVE FREE 10 ML: 5 INJECTION INTRAVENOUS at 10:23

## 2019-09-27 RX ADMIN — METOCLOPRAMIDE HYDROCHLORIDE 5 MG: 5 SOLUTION ORAL at 12:41

## 2019-09-27 ASSESSMENT — ENCOUNTER SYMPTOMS
NAUSEA: 0
DIARRHEA: 0
BACK PAIN: 1
SHORTNESS OF BREATH: 0
ABDOMINAL PAIN: 0
COLOR CHANGE: 0
FACIAL SWELLING: 0
CHEST TIGHTNESS: 0

## 2019-09-27 ASSESSMENT — PAIN SCALES - GENERAL
PAINLEVEL_OUTOF10: 3
PAINLEVEL_OUTOF10: 0
PAINLEVEL_OUTOF10: 4
PAINLEVEL_OUTOF10: 3

## 2019-09-28 VITALS
DIASTOLIC BLOOD PRESSURE: 94 MMHG | BODY MASS INDEX: 33.49 KG/M2 | WEIGHT: 221 LBS | RESPIRATION RATE: 18 BRPM | TEMPERATURE: 98.4 F | OXYGEN SATURATION: 98 % | HEART RATE: 66 BPM | HEIGHT: 68 IN | SYSTOLIC BLOOD PRESSURE: 118 MMHG

## 2019-09-28 PROBLEM — N39.0 SEPSIS SECONDARY TO UTI (HCC): Status: RESOLVED | Noted: 2019-09-25 | Resolved: 2019-09-28

## 2019-09-28 PROBLEM — A41.9 SEPSIS SECONDARY TO UTI (HCC): Status: RESOLVED | Noted: 2019-09-25 | Resolved: 2019-09-28

## 2019-09-28 PROCEDURE — 90686 IIV4 VACC NO PRSV 0.5 ML IM: CPT | Performed by: INTERNAL MEDICINE

## 2019-09-28 PROCEDURE — 97535 SELF CARE MNGMENT TRAINING: CPT

## 2019-09-28 PROCEDURE — 6360000002 HC RX W HCPCS: Performed by: INTERNAL MEDICINE

## 2019-09-28 PROCEDURE — 2580000003 HC RX 258: Performed by: INTERNAL MEDICINE

## 2019-09-28 PROCEDURE — 6370000000 HC RX 637 (ALT 250 FOR IP): Performed by: INTERNAL MEDICINE

## 2019-09-28 PROCEDURE — G0008 ADMIN INFLUENZA VIRUS VAC: HCPCS | Performed by: INTERNAL MEDICINE

## 2019-09-28 PROCEDURE — 97162 PT EVAL MOD COMPLEX 30 MIN: CPT

## 2019-09-28 PROCEDURE — 97530 THERAPEUTIC ACTIVITIES: CPT

## 2019-09-28 PROCEDURE — 97166 OT EVAL MOD COMPLEX 45 MIN: CPT

## 2019-09-28 PROCEDURE — 6370000000 HC RX 637 (ALT 250 FOR IP): Performed by: NURSE PRACTITIONER

## 2019-09-28 PROCEDURE — 99238 HOSP IP/OBS DSCHRG MGMT 30/<: CPT | Performed by: INTERNAL MEDICINE

## 2019-09-28 PROCEDURE — 6370000000 HC RX 637 (ALT 250 FOR IP): Performed by: STUDENT IN AN ORGANIZED HEALTH CARE EDUCATION/TRAINING PROGRAM

## 2019-09-28 RX ORDER — GREEN TEA/HOODIA GORDONII 315-12.5MG
1 CAPSULE ORAL 2 TIMES DAILY
Qty: 60 TABLET | Refills: 0 | Status: SHIPPED | OUTPATIENT
Start: 2019-09-28 | End: 2019-10-28

## 2019-09-28 RX ADMIN — CIPROFLOXACIN 500 MG: 500 TABLET ORAL at 09:59

## 2019-09-28 RX ADMIN — SODIUM CHLORIDE, PRESERVATIVE FREE 10 ML: 5 INJECTION INTRAVENOUS at 10:00

## 2019-09-28 RX ADMIN — METOPROLOL SUCCINATE 25 MG: 25 TABLET, FILM COATED, EXTENDED RELEASE ORAL at 09:59

## 2019-09-28 RX ADMIN — METOCLOPRAMIDE HYDROCHLORIDE 5 MG: 5 SOLUTION ORAL at 09:59

## 2019-09-28 RX ADMIN — INFLUENZA A VIRUS A/BRISBANE/02/2018 IVR-190 (H1N1) ANTIGEN (PROPIOLACTONE INACTIVATED), INFLUENZA A VIRUS A/KANSAS/14/2017 X-327 (H3N2) ANTIGEN (PROPIOLACTONE INACTIVATED), INFLUENZA B VIRUS B/MARYLAND/15/2016 ANTIGEN (PROPIOLACTONE INACTIVATED), INFLUENZA B VIRUS B/PHUKET/3073/2013 BVR-1B ANTIGEN (PROPIOLACTONE INACTIVATED) 0.5 ML: 15; 15; 15; 15 INJECTION, SUSPENSION INTRAMUSCULAR at 13:43

## 2019-09-28 RX ADMIN — GABAPENTIN 300 MG: 300 CAPSULE ORAL at 15:15

## 2019-09-28 RX ADMIN — ACETAMINOPHEN 650 MG: 325 TABLET ORAL at 06:01

## 2019-09-28 RX ADMIN — ROPINIROLE HYDROCHLORIDE 0.25 MG: 0.25 TABLET, FILM COATED ORAL at 09:58

## 2019-09-28 RX ADMIN — ACETAMINOPHEN 650 MG: 325 TABLET ORAL at 10:11

## 2019-09-28 RX ADMIN — HYPROMELLOSE 2910 1 DROP: 5 SOLUTION OPHTHALMIC at 06:01

## 2019-09-28 RX ADMIN — ENOXAPARIN SODIUM 40 MG: 40 INJECTION SUBCUTANEOUS at 09:59

## 2019-09-28 RX ADMIN — PANTOPRAZOLE SODIUM 40 MG: 40 TABLET, DELAYED RELEASE ORAL at 09:59

## 2019-09-28 RX ADMIN — GABAPENTIN 300 MG: 300 CAPSULE ORAL at 09:58

## 2019-09-28 ASSESSMENT — ENCOUNTER SYMPTOMS
FACIAL SWELLING: 0
SHORTNESS OF BREATH: 0
BACK PAIN: 1
NAUSEA: 0
DIARRHEA: 0
CHEST TIGHTNESS: 0
COLOR CHANGE: 0
EYE ITCHING: 0
ABDOMINAL PAIN: 0

## 2019-09-28 ASSESSMENT — PAIN SCALES - GENERAL
PAINLEVEL_OUTOF10: 4
PAINLEVEL_OUTOF10: 4
PAINLEVEL_OUTOF10: 0
PAINLEVEL_OUTOF10: 4
PAINLEVEL_OUTOF10: 4
PAINLEVEL_OUTOF10: 3

## 2019-09-28 ASSESSMENT — PAIN DESCRIPTION - DESCRIPTORS: DESCRIPTORS: HEADACHE

## 2019-09-28 ASSESSMENT — PAIN DESCRIPTION - PAIN TYPE: TYPE: ACUTE PAIN

## 2019-09-28 ASSESSMENT — PAIN DESCRIPTION - LOCATION: LOCATION: HEAD

## 2019-09-30 ENCOUNTER — TELEPHONE (OUTPATIENT)
Dept: PRIMARY CARE CLINIC | Age: 74
End: 2019-09-30

## 2019-10-03 ENCOUNTER — APPOINTMENT (OUTPATIENT)
Dept: PHYSICAL THERAPY | Facility: CLINIC | Age: 74
End: 2019-10-03
Payer: COMMERCIAL

## 2019-10-03 ENCOUNTER — OFFICE VISIT (OUTPATIENT)
Dept: PRIMARY CARE CLINIC | Age: 74
End: 2019-10-03
Payer: COMMERCIAL

## 2019-10-03 VITALS
DIASTOLIC BLOOD PRESSURE: 60 MMHG | HEART RATE: 79 BPM | BODY MASS INDEX: 32.23 KG/M2 | OXYGEN SATURATION: 100 % | WEIGHT: 212 LBS | SYSTOLIC BLOOD PRESSURE: 99 MMHG | TEMPERATURE: 97 F

## 2019-10-03 DIAGNOSIS — A41.51 SEPSIS DUE TO ESCHERICHIA COLI WITHOUT ACUTE ORGAN DYSFUNCTION (HCC): ICD-10-CM

## 2019-10-03 DIAGNOSIS — N39.0 URINARY TRACT INFECTION WITHOUT HEMATURIA, SITE UNSPECIFIED: Primary | ICD-10-CM

## 2019-10-03 PROCEDURE — 99495 TRANSJ CARE MGMT MOD F2F 14D: CPT | Performed by: NURSE PRACTITIONER

## 2019-10-03 PROCEDURE — 1111F DSCHRG MED/CURRENT MED MERGE: CPT | Performed by: NURSE PRACTITIONER

## 2019-10-03 ASSESSMENT — ENCOUNTER SYMPTOMS
SINUS PRESSURE: 1
SHORTNESS OF BREATH: 1
COUGH: 1

## 2019-10-10 ENCOUNTER — HOSPITAL ENCOUNTER (OUTPATIENT)
Dept: PHYSICAL THERAPY | Facility: CLINIC | Age: 74
Setting detail: THERAPIES SERIES
Discharge: HOME | End: 2019-10-10
Payer: COMMERCIAL

## 2019-10-10 PROCEDURE — 97110 THERAPEUTIC EXERCISES: CPT

## 2019-10-10 PROCEDURE — 97530 THERAPEUTIC ACTIVITIES: CPT

## 2019-10-10 PROCEDURE — 97112 NEUROMUSCULAR REEDUCATION: CPT

## 2019-10-12 ASSESSMENT — ENCOUNTER SYMPTOMS: EYE DISCHARGE: 0

## 2019-10-16 ENCOUNTER — OFFICE VISIT (OUTPATIENT)
Dept: INFECTIOUS DISEASES | Age: 74
End: 2019-10-16
Payer: COMMERCIAL

## 2019-10-16 VITALS
TEMPERATURE: 96.4 F | DIASTOLIC BLOOD PRESSURE: 70 MMHG | HEART RATE: 69 BPM | HEIGHT: 69 IN | BODY MASS INDEX: 30.96 KG/M2 | WEIGHT: 209 LBS | RESPIRATION RATE: 16 BRPM | SYSTOLIC BLOOD PRESSURE: 119 MMHG | OXYGEN SATURATION: 95 %

## 2019-10-16 DIAGNOSIS — M47.816 SPONDYLOSIS OF LUMBAR REGION WITHOUT MYELOPATHY OR RADICULOPATHY: ICD-10-CM

## 2019-10-16 DIAGNOSIS — R10.9 ABDOMINAL CRAMPS: ICD-10-CM

## 2019-10-16 DIAGNOSIS — I48.20 CHRONIC ATRIAL FIBRILLATION (HCC): ICD-10-CM

## 2019-10-16 DIAGNOSIS — M79.10 MYALGIA: ICD-10-CM

## 2019-10-16 DIAGNOSIS — N39.0 COMPLICATED UTI (URINARY TRACT INFECTION): ICD-10-CM

## 2019-10-16 DIAGNOSIS — N39.0 RECURRENT UTI: Primary | ICD-10-CM

## 2019-10-16 DIAGNOSIS — K25.3 ACUTE GASTRIC ULCER WITHOUT HEMORRHAGE OR PERFORATION: ICD-10-CM

## 2019-10-16 DIAGNOSIS — J41.0 SIMPLE CHRONIC BRONCHITIS (HCC): ICD-10-CM

## 2019-10-16 DIAGNOSIS — Z76.0 MEDICATION REFILL: ICD-10-CM

## 2019-10-16 PROCEDURE — 99214 OFFICE O/P EST MOD 30 MIN: CPT | Performed by: INTERNAL MEDICINE

## 2019-10-16 PROCEDURE — G8417 CALC BMI ABV UP PARAM F/U: HCPCS | Performed by: INTERNAL MEDICINE

## 2019-10-16 PROCEDURE — G8482 FLU IMMUNIZE ORDER/ADMIN: HCPCS | Performed by: INTERNAL MEDICINE

## 2019-10-16 PROCEDURE — 1036F TOBACCO NON-USER: CPT | Performed by: INTERNAL MEDICINE

## 2019-10-16 PROCEDURE — G8427 DOCREV CUR MEDS BY ELIG CLIN: HCPCS | Performed by: INTERNAL MEDICINE

## 2019-10-16 PROCEDURE — 1111F DSCHRG MED/CURRENT MED MERGE: CPT | Performed by: INTERNAL MEDICINE

## 2019-10-16 PROCEDURE — 3017F COLORECTAL CA SCREEN DOC REV: CPT | Performed by: INTERNAL MEDICINE

## 2019-10-16 PROCEDURE — 4040F PNEUMOC VAC/ADMIN/RCVD: CPT | Performed by: INTERNAL MEDICINE

## 2019-10-16 PROCEDURE — 1123F ACP DISCUSS/DSCN MKR DOCD: CPT | Performed by: INTERNAL MEDICINE

## 2019-10-16 RX ORDER — FLUTICASONE FUROATE AND VILANTEROL 200; 25 UG/1; UG/1
1 POWDER RESPIRATORY (INHALATION) DAILY
COMMUNITY
End: 2021-07-28

## 2019-10-16 RX ORDER — CEPHALEXIN 500 MG/1
500 CAPSULE ORAL 3 TIMES DAILY
Qty: 42 CAPSULE | Refills: 0 | Status: SHIPPED | OUTPATIENT
Start: 2019-10-16 | End: 2020-06-05 | Stop reason: SDUPTHER

## 2019-10-16 RX ORDER — TIZANIDINE 4 MG/1
4 TABLET ORAL DAILY PRN
COMMUNITY
End: 2019-10-17

## 2019-10-16 RX ORDER — GREEN TEA/HOODIA GORDONII 315-12.5MG
1 CAPSULE ORAL 2 TIMES DAILY
Qty: 60 TABLET | Refills: 1 | Status: SHIPPED | OUTPATIENT
Start: 2019-10-16 | End: 2019-11-15

## 2019-10-17 ENCOUNTER — HOSPITAL ENCOUNTER (OUTPATIENT)
Dept: PHYSICAL THERAPY | Facility: CLINIC | Age: 74
Setting detail: THERAPIES SERIES
Discharge: HOME | End: 2019-10-17
Payer: COMMERCIAL

## 2019-10-17 PROCEDURE — 97110 THERAPEUTIC EXERCISES: CPT

## 2019-10-17 PROCEDURE — 97112 NEUROMUSCULAR REEDUCATION: CPT

## 2019-10-17 RX ORDER — GABAPENTIN 300 MG/1
CAPSULE ORAL
Qty: 84 CAPSULE | Refills: 2 | Status: SHIPPED | OUTPATIENT
Start: 2019-10-17 | End: 2020-02-06

## 2019-10-17 RX ORDER — SPIRONOLACTONE 25 MG/1
25 TABLET ORAL DAILY
Qty: 28 TABLET | Refills: 3 | Status: SHIPPED | OUTPATIENT
Start: 2019-10-17 | End: 2020-02-06

## 2019-10-17 RX ORDER — PANTOPRAZOLE SODIUM 40 MG/1
40 TABLET, DELAYED RELEASE ORAL DAILY
Qty: 28 TABLET | Refills: 3 | Status: SHIPPED | OUTPATIENT
Start: 2019-10-17 | End: 2020-02-06

## 2019-10-17 RX ORDER — TIZANIDINE 4 MG/1
TABLET ORAL
Qty: 28 TABLET | Refills: 3 | Status: SHIPPED | OUTPATIENT
Start: 2019-10-17 | End: 2019-10-31 | Stop reason: SDUPTHER

## 2019-10-17 RX ORDER — GUAIFENESIN 600 MG/1
TABLET ORAL
Qty: 30 TABLET | Refills: 3 | Status: SHIPPED | OUTPATIENT
Start: 2019-10-17 | End: 2020-03-05 | Stop reason: SDUPTHER

## 2019-10-17 RX ORDER — ROPINIROLE 0.25 MG/1
TABLET, FILM COATED ORAL
Qty: 56 TABLET | Refills: 3 | Status: SHIPPED | OUTPATIENT
Start: 2019-10-17 | End: 2020-02-06

## 2019-10-17 RX ORDER — POLYPROPYLENE GLYCOL 400, PROPYLENE GLYCOL .4; .3 G/100ML; G/100ML
LIQUID OPHTHALMIC
Qty: 15 ML | Refills: 3 | Status: SHIPPED | OUTPATIENT
Start: 2019-10-17

## 2019-10-17 ASSESSMENT — ENCOUNTER SYMPTOMS
COLOR CHANGE: 0
ABDOMINAL PAIN: 0
APNEA: 0
EYE ITCHING: 0

## 2019-10-21 ENCOUNTER — HOSPITAL ENCOUNTER (OUTPATIENT)
Age: 74
Discharge: HOME | End: 2019-10-21
Payer: COMMERCIAL

## 2019-10-21 DIAGNOSIS — N39.0 RECURRENT UTI: ICD-10-CM

## 2019-10-21 LAB
BILIRUBIN URINE: NEGATIVE
COLOR: YELLOW
COMMENT UA: NORMAL
GLUCOSE URINE: NEGATIVE
KETONES, URINE: NEGATIVE
LEUKOCYTE ESTERASE, URINE: NEGATIVE
NITRITE, URINE: NEGATIVE
PH UA: 5 (ref 5–8)
PROTEIN UA: NEGATIVE
SPECIFIC GRAVITY UA: 1.02 (ref 1–1.03)
TURBIDITY: CLEAR
URINE HGB: NEGATIVE
UROBILINOGEN, URINE: NORMAL

## 2019-10-21 PROCEDURE — 87086 URINE CULTURE/COLONY COUNT: CPT

## 2019-10-21 PROCEDURE — 81003 URINALYSIS AUTO W/O SCOPE: CPT

## 2019-10-22 ENCOUNTER — HOSPITAL ENCOUNTER (OUTPATIENT)
Dept: PHYSICAL THERAPY | Facility: CLINIC | Age: 74
Setting detail: THERAPIES SERIES
Discharge: HOME | End: 2019-10-22
Payer: COMMERCIAL

## 2019-10-22 LAB
CULTURE: NO GROWTH
Lab: NORMAL
SPECIMEN DESCRIPTION: NORMAL

## 2019-10-22 PROCEDURE — 97112 NEUROMUSCULAR REEDUCATION: CPT

## 2019-10-22 PROCEDURE — 97110 THERAPEUTIC EXERCISES: CPT

## 2019-10-24 ENCOUNTER — APPOINTMENT (OUTPATIENT)
Dept: PHYSICAL THERAPY | Facility: CLINIC | Age: 74
End: 2019-10-24
Payer: COMMERCIAL

## 2019-10-30 ENCOUNTER — HOSPITAL ENCOUNTER (OUTPATIENT)
Age: 74
Discharge: HOME | End: 2019-10-30
Payer: COMMERCIAL

## 2019-10-30 ENCOUNTER — HOSPITAL ENCOUNTER (OUTPATIENT)
Dept: PHYSICAL THERAPY | Facility: CLINIC | Age: 74
Setting detail: THERAPIES SERIES
Discharge: HOME | End: 2019-10-30
Payer: COMMERCIAL

## 2019-10-30 LAB — PROSTATE SPECIFIC ANTIGEN: 0.3 UG/L

## 2019-10-30 PROCEDURE — G0103 PSA SCREENING: HCPCS

## 2019-10-30 PROCEDURE — 97530 THERAPEUTIC ACTIVITIES: CPT

## 2019-10-30 PROCEDURE — 36415 COLL VENOUS BLD VENIPUNCTURE: CPT

## 2019-10-30 PROCEDURE — 97110 THERAPEUTIC EXERCISES: CPT

## 2019-10-31 ENCOUNTER — OFFICE VISIT (OUTPATIENT)
Dept: PRIMARY CARE CLINIC | Age: 74
End: 2019-10-31
Payer: COMMERCIAL

## 2019-10-31 VITALS
BODY MASS INDEX: 30.72 KG/M2 | TEMPERATURE: 97 F | OXYGEN SATURATION: 99 % | HEART RATE: 73 BPM | WEIGHT: 208 LBS | SYSTOLIC BLOOD PRESSURE: 106 MMHG | DIASTOLIC BLOOD PRESSURE: 70 MMHG

## 2019-10-31 DIAGNOSIS — R53.83 FATIGUE, UNSPECIFIED TYPE: ICD-10-CM

## 2019-10-31 DIAGNOSIS — Z00.00 ROUTINE GENERAL MEDICAL EXAMINATION AT A HEALTH CARE FACILITY: Primary | ICD-10-CM

## 2019-10-31 DIAGNOSIS — Z23 NEED FOR PROPHYLACTIC VACCINATION AGAINST STREPTOCOCCUS PNEUMONIAE (PNEUMOCOCCUS): ICD-10-CM

## 2019-10-31 DIAGNOSIS — M79.10 MYALGIA: ICD-10-CM

## 2019-10-31 PROCEDURE — 1123F ACP DISCUSS/DSCN MKR DOCD: CPT | Performed by: PHYSICIAN ASSISTANT

## 2019-10-31 PROCEDURE — 3017F COLORECTAL CA SCREEN DOC REV: CPT | Performed by: PHYSICIAN ASSISTANT

## 2019-10-31 PROCEDURE — 4040F PNEUMOC VAC/ADMIN/RCVD: CPT | Performed by: PHYSICIAN ASSISTANT

## 2019-10-31 PROCEDURE — 90732 PPSV23 VACC 2 YRS+ SUBQ/IM: CPT | Performed by: PHYSICIAN ASSISTANT

## 2019-10-31 PROCEDURE — G0009 ADMIN PNEUMOCOCCAL VACCINE: HCPCS | Performed by: PHYSICIAN ASSISTANT

## 2019-10-31 PROCEDURE — G0438 PPPS, INITIAL VISIT: HCPCS | Performed by: PHYSICIAN ASSISTANT

## 2019-10-31 PROCEDURE — G8482 FLU IMMUNIZE ORDER/ADMIN: HCPCS | Performed by: PHYSICIAN ASSISTANT

## 2019-10-31 RX ORDER — PYRIDOXINE HCL (VITAMIN B6) 50 MG
1 TABLET ORAL DAILY PRN
Qty: 30 TABLET | Refills: 2 | Status: SHIPPED | OUTPATIENT
Start: 2019-10-31 | End: 2020-01-21

## 2019-10-31 RX ORDER — TIZANIDINE 4 MG/1
4 TABLET ORAL 2 TIMES DAILY
Qty: 60 TABLET | Refills: 2 | Status: SHIPPED | OUTPATIENT
Start: 2019-10-31 | End: 2020-03-05

## 2019-10-31 ASSESSMENT — LIFESTYLE VARIABLES: HOW OFTEN DO YOU HAVE A DRINK CONTAINING ALCOHOL: 0

## 2019-10-31 ASSESSMENT — PATIENT HEALTH QUESTIONNAIRE - PHQ9
SUM OF ALL RESPONSES TO PHQ QUESTIONS 1-9: 1
SUM OF ALL RESPONSES TO PHQ QUESTIONS 1-9: 1

## 2019-11-02 ENCOUNTER — TELEPHONE (OUTPATIENT)
Dept: PRIMARY CARE CLINIC | Age: 74
End: 2019-11-02

## 2019-11-11 ENCOUNTER — OFFICE VISIT (OUTPATIENT)
Dept: INFECTIOUS DISEASES | Age: 74
End: 2019-11-11
Payer: MEDICARE

## 2019-11-11 VITALS
SYSTOLIC BLOOD PRESSURE: 116 MMHG | WEIGHT: 206.8 LBS | HEIGHT: 70 IN | DIASTOLIC BLOOD PRESSURE: 64 MMHG | HEART RATE: 64 BPM | RESPIRATION RATE: 16 BRPM | BODY MASS INDEX: 29.6 KG/M2

## 2019-11-11 DIAGNOSIS — N39.0 RECURRENT UTI: Primary | ICD-10-CM

## 2019-11-11 PROCEDURE — 1123F ACP DISCUSS/DSCN MKR DOCD: CPT | Performed by: INTERNAL MEDICINE

## 2019-11-11 PROCEDURE — 99213 OFFICE O/P EST LOW 20 MIN: CPT | Performed by: INTERNAL MEDICINE

## 2019-11-11 PROCEDURE — 3017F COLORECTAL CA SCREEN DOC REV: CPT | Performed by: INTERNAL MEDICINE

## 2019-11-11 PROCEDURE — 4040F PNEUMOC VAC/ADMIN/RCVD: CPT | Performed by: INTERNAL MEDICINE

## 2019-11-11 PROCEDURE — G8417 CALC BMI ABV UP PARAM F/U: HCPCS | Performed by: INTERNAL MEDICINE

## 2019-11-11 PROCEDURE — G8482 FLU IMMUNIZE ORDER/ADMIN: HCPCS | Performed by: INTERNAL MEDICINE

## 2019-11-11 PROCEDURE — 1036F TOBACCO NON-USER: CPT | Performed by: INTERNAL MEDICINE

## 2019-11-11 PROCEDURE — G8428 CUR MEDS NOT DOCUMENT: HCPCS | Performed by: INTERNAL MEDICINE

## 2019-11-11 ASSESSMENT — ENCOUNTER SYMPTOMS
ABDOMINAL PAIN: 0
EYE PAIN: 0
APNEA: 0
EYE ITCHING: 0
COLOR CHANGE: 0

## 2019-12-19 ENCOUNTER — TELEPHONE (OUTPATIENT)
Dept: PRIMARY CARE CLINIC | Age: 74
End: 2019-12-19

## 2019-12-19 DIAGNOSIS — R09.81 SINUS CONGESTION: Primary | ICD-10-CM

## 2019-12-19 RX ORDER — AMOXICILLIN 250 MG/1
500 CAPSULE ORAL 2 TIMES DAILY
Qty: 20 CAPSULE | Refills: 0 | Status: SHIPPED | OUTPATIENT
Start: 2019-12-19 | End: 2020-06-05 | Stop reason: SDUPTHER

## 2019-12-19 NOTE — TELEPHONE ENCOUNTER
Patient called requesting antibiotics. He is having yellow/green nasal drainage, sinus headache, with irritation to his throat. For about 6 days now.  Advised walk-in but pt would like to see if medication can get called in first

## 2020-01-16 ENCOUNTER — HOSPITAL ENCOUNTER (OUTPATIENT)
Dept: GENERAL RADIOLOGY | Age: 75
Discharge: HOME | End: 2020-01-18
Payer: MEDICARE

## 2020-01-16 ENCOUNTER — HOSPITAL ENCOUNTER (OUTPATIENT)
Age: 75
Discharge: HOME | End: 2020-01-18
Payer: MEDICARE

## 2020-01-16 PROCEDURE — 71046 X-RAY EXAM CHEST 2 VIEWS: CPT

## 2020-02-06 RX ORDER — PANTOPRAZOLE SODIUM 40 MG/1
40 TABLET, DELAYED RELEASE ORAL DAILY
Qty: 28 TABLET | Refills: 3 | Status: SHIPPED | OUTPATIENT
Start: 2020-02-06 | End: 2020-05-27

## 2020-02-06 RX ORDER — DIPHENOXYLATE HYDROCHLORIDE AND ATROPINE SULFATE 2.5; .025 MG/1; MG/1
TABLET ORAL
Qty: 28 TABLET | Refills: 5 | Status: SHIPPED | OUTPATIENT
Start: 2020-02-06 | End: 2020-07-16 | Stop reason: SDUPTHER

## 2020-02-06 RX ORDER — METOPROLOL SUCCINATE 25 MG/1
25 TABLET, EXTENDED RELEASE ORAL DAILY
Qty: 28 TABLET | Refills: 3 | Status: SHIPPED | OUTPATIENT
Start: 2020-02-06 | End: 2020-05-27

## 2020-02-06 RX ORDER — ROPINIROLE 0.25 MG/1
TABLET, FILM COATED ORAL
Qty: 56 TABLET | Refills: 3 | Status: SHIPPED | OUTPATIENT
Start: 2020-02-06 | End: 2020-05-27

## 2020-02-06 RX ORDER — GABAPENTIN 300 MG/1
CAPSULE ORAL
Qty: 84 CAPSULE | Refills: 2 | Status: SHIPPED | OUTPATIENT
Start: 2020-02-06 | End: 2020-03-31

## 2020-02-06 RX ORDER — SPIRONOLACTONE 25 MG/1
25 TABLET ORAL DAILY
Qty: 28 TABLET | Refills: 3 | Status: SHIPPED | OUTPATIENT
Start: 2020-02-06 | End: 2020-05-27

## 2020-02-06 NOTE — TELEPHONE ENCOUNTER
Health Maintenance   Topic Date Due    Shingles Vaccine (1 of 2) 10/31/1996    Potassium monitoring  09/26/2020    Creatinine monitoring  09/26/2020    Annual Wellness Visit (AWV)  10/31/2020    Colon cancer screen colonoscopy  03/26/2023    Lipid screen  06/24/2024    DTaP/Tdap/Td vaccine (2 - Td) 04/22/2029    Flu vaccine  Completed    Pneumococcal 65+ years Vaccine  Completed    AAA screen  Completed    Hepatitis C screen  Completed    Hepatitis A vaccine  Aged Out    Hepatitis B vaccine  Aged Out    Hib vaccine  Aged Out    Meningococcal (ACWY) vaccine  Aged Out             (applicable per patient's age: Cancer Screenings, Depression Screening, Fall Risk Screening, Immunizations)    LDL Cholesterol (mg/dL)   Date Value   06/24/2019 65     LDL Calculated (mg/dL)   Date Value   10/30/2013 67     AST (U/L)   Date Value   09/25/2019 19     ALT (U/L)   Date Value   09/25/2019 10     BUN (mg/dL)   Date Value   09/26/2019 19      (goal A1C is < 7)   (goal LDL is <100) need 30-50% reduction from baseline     BP Readings from Last 3 Encounters:   11/11/19 116/64   10/31/19 106/70   10/16/19 119/70    (goal /80)      All Future Testing planned in [facility]:  Lab Frequency Next Occurrence   Clostridium Difficile Toxin/Antigen Once 04/02/2020       Next Visit Date:  Future Appointments   Date Time Provider Jeri Flynn   3/5/2020  3:00 PM Jaylin Corona PA-C [facility]   5/11/2020  1:15 PM Katie Hampton MD INFT DISEASE [facility]            Patient Active Problem List:     Atrial fibrillation ([address]     Hyperlipidemia     GERD (gastroesophageal reflux disease)     Osteoarthritis of lumbar spine     OA (osteoarthritis) of knee, bilateral     Foot drop, right     Impaired hearing     Chronic rhinosinusitis     Lymphedema of both lower extremities     Hallux valgus, acquired, bilateral     Gait disorder     S/P revision of total knee     Fracture of femur ([address]     Hypertension     Slow

## 2020-03-04 NOTE — TELEPHONE ENCOUNTER
Health Maintenance   Topic Date Due    Shingles Vaccine (1 of 2) 10/31/1996    Potassium monitoring  09/26/2020    Creatinine monitoring  09/26/2020    Colon cancer screen colonoscopy  03/26/2023    Lipid screen  06/24/2024    DTaP/Tdap/Td vaccine (2 - Td) 04/22/2029    Flu vaccine  Completed    Pneumococcal 65+ years Vaccine  Completed    AAA screen  Completed    Hepatitis C screen  Completed    Hepatitis A vaccine  Aged Out    Hepatitis B vaccine  Aged Out    Hib vaccine  Aged Out    Meningococcal (ACWY) vaccine  Aged Out             (applicable per patient's age: Cancer Screenings, Depression Screening, Fall Risk Screening, Immunizations)    LDL Cholesterol (mg/dL)   Date Value   06/24/2019 65     LDL Calculated (mg/dL)   Date Value   10/30/2013 67     AST (U/L)   Date Value   09/25/2019 19     ALT (U/L)   Date Value   09/25/2019 10     BUN (mg/dL)   Date Value   09/26/2019 19      (goal A1C is < 7)   (goal LDL is <100) need 30-50% reduction from baseline     BP Readings from Last 3 Encounters:   11/11/19 116/64   10/31/19 106/70   10/16/19 119/70    (goal /80)      All Future Testing planned in [facility]:  Lab Frequency Next Occurrence   Clostridium Difficile Toxin/Antigen Once 04/02/2020       Next Visit Date:  Future Appointments   Date Time Provider Jeri [facility]   3/5/2020  3:00 PM Chuckie Travis PA-C [facility]   5/11/2020  1:15 PM Efraín Morris MD INFT DISEASE [facility]            Patient Active Problem List:     Atrial fibrillation ([address]     Hyperlipidemia     GERD (gastroesophageal reflux disease)     Osteoarthritis of lumbar spine     OA (osteoarthritis) of knee, bilateral     Foot drop, right     Impaired hearing     Chronic rhinosinusitis     Lymphedema of both lower extremities     Hallux valgus, acquired, bilateral     Gait disorder     S/P revision of total knee     Fracture of femur ([address]     Hypertension     Slow transit constipation     Internal

## 2020-03-05 ENCOUNTER — OFFICE VISIT (OUTPATIENT)
Dept: PRIMARY CARE CLINIC | Age: 75
End: 2020-03-05
Payer: COMMERCIAL

## 2020-03-05 VITALS
SYSTOLIC BLOOD PRESSURE: 111 MMHG | TEMPERATURE: 97.5 F | WEIGHT: 207.8 LBS | BODY MASS INDEX: 29.82 KG/M2 | DIASTOLIC BLOOD PRESSURE: 68 MMHG | HEART RATE: 65 BPM

## 2020-03-05 LAB
BILIRUBIN, POC: ABNORMAL
BLOOD URINE, POC: ABNORMAL
CLARITY, POC: CLEAR
COLOR, POC: YELLOW
GLUCOSE URINE, POC: ABNORMAL
KETONES, POC: ABNORMAL
LEUKOCYTE EST, POC: ABNORMAL
NITRITE, POC: ABNORMAL
PH, POC: 6.5
PROTEIN, POC: ABNORMAL
SPECIFIC GRAVITY, POC: 1.01
UROBILINOGEN, POC: ABNORMAL

## 2020-03-05 PROCEDURE — 81002 URINALYSIS NONAUTO W/O SCOPE: CPT | Performed by: PHYSICIAN ASSISTANT

## 2020-03-05 PROCEDURE — 99214 OFFICE O/P EST MOD 30 MIN: CPT | Performed by: PHYSICIAN ASSISTANT

## 2020-03-05 RX ORDER — AMOXICILLIN AND CLAVULANATE POTASSIUM 500; 125 MG/1; MG/1
1 TABLET, FILM COATED ORAL 2 TIMES DAILY
Qty: 14 TABLET | Refills: 0 | Status: SHIPPED | OUTPATIENT
Start: 2020-03-05 | End: 2020-03-12

## 2020-03-05 RX ORDER — GUAIFENESIN 600 MG/1
600 TABLET, EXTENDED RELEASE ORAL 2 TIMES DAILY
Qty: 60 TABLET | Refills: 3 | Status: SHIPPED | OUTPATIENT
Start: 2020-03-05 | End: 2020-10-26 | Stop reason: SDUPTHER

## 2020-03-05 RX ORDER — BUMETANIDE 1 MG/1
TABLET ORAL
Qty: 56 TABLET | Refills: 2 | Status: ON HOLD | OUTPATIENT
Start: 2020-03-05 | End: 2020-07-03 | Stop reason: SDUPTHER

## 2020-03-05 RX ORDER — POTASSIUM CHLORIDE 20 MEQ/1
TABLET, EXTENDED RELEASE ORAL
Qty: 56 TABLET | Refills: 2 | OUTPATIENT
Start: 2020-03-05

## 2020-03-05 RX ORDER — POTASSIUM CHLORIDE 20 MEQ/1
20 TABLET, EXTENDED RELEASE ORAL 2 TIMES DAILY
Qty: 56 TABLET | Refills: 3 | Status: SHIPPED | OUTPATIENT
Start: 2020-03-05 | End: 2020-07-16 | Stop reason: SDUPTHER

## 2020-03-05 RX ORDER — TIZANIDINE 4 MG/1
TABLET ORAL
Qty: 28 TABLET | Refills: 3 | Status: SHIPPED | OUTPATIENT
Start: 2020-03-05 | End: 2020-06-27

## 2020-03-05 RX ORDER — FLUTICASONE PROPIONATE 50 MCG
2 SPRAY, SUSPENSION (ML) NASAL DAILY
Qty: 16 G | Refills: 3 | Status: SHIPPED | OUTPATIENT
Start: 2020-03-05 | End: 2020-11-05 | Stop reason: SDUPTHER

## 2020-03-05 RX ORDER — BUMETANIDE 1 MG/1
TABLET ORAL
Qty: 56 TABLET | Refills: 2 | OUTPATIENT
Start: 2020-03-05

## 2020-03-05 ASSESSMENT — PATIENT HEALTH QUESTIONNAIRE - PHQ9
SUM OF ALL RESPONSES TO PHQ QUESTIONS 1-9: 0
SUM OF ALL RESPONSES TO PHQ QUESTIONS 1-9: 0
SUM OF ALL RESPONSES TO PHQ9 QUESTIONS 1 & 2: 0
1. LITTLE INTEREST OR PLEASURE IN DOING THINGS: 0
2. FEELING DOWN, DEPRESSED OR HOPELESS: 0

## 2020-03-05 ASSESSMENT — ENCOUNTER SYMPTOMS
SHORTNESS OF BREATH: 1
DIARRHEA: 1
COUGH: 1

## 2020-03-05 NOTE — PROGRESS NOTES
other days 56 tablet 2    potassium chloride (KLOR-CON M) 20 MEQ extended release tablet Take 1 tablet by mouth 2 times daily 56 tablet 3    fluticasone (FLONASE) 50 MCG/ACT nasal spray 2 sprays by Nasal route daily 16 g 3    guaiFENesin (SM MUCUS RELIEF) 600 MG extended release tablet Take 1 tablet by mouth 2 times daily 60 tablet 3    gabapentin (NEURONTIN) 300 MG capsule TAKE 1 CAPSULE BY MOUTH THREE TIMES DAILY 84 capsule 2    Multiple Vitamin (MULTI-VITAMINS) TABS TAKE 1 TABLET BY MOUTH DAILY 28 tablet 5    metoprolol succinate (TOPROL XL) 25 MG extended release tablet TAKE 1 TABLET BY MOUTH DAILY 28 tablet 3    rivaroxaban (XARELTO) 20 MG TABS tablet TAKE 1 TABLET BY MOUTH DAILY 28 tablet 3    spironolactone (ALDACTONE) 25 MG tablet TAKE 1 TABLET BY MOUTH DAILY 28 tablet 3    rOPINIRole (REQUIP) 0.25 MG tablet TAKE 1 TABLET BY MOUTH TWICE DAILY 56 tablet 3    vitamin B-12 (CYANOCOBALAMIN) 100 MCG tablet TAKE 1 TABLET BY MOUTH DAILY AS NEEDED FOR FATIGUE 30 tablet 5    cetirizine (ZYRTEC) 10 MG tablet TAKE 1 TABLET BY MOUTH DAILY 28 tablet 5    SM LUBRICANT EYE DROPS 0.4-0.3 % ophthalmic solution PLACE 1 DROP INTO BOTH EYES FOUR TIMES DAILY AS NEEDED FOR DRY EYES 15 mL 3    Fluticasone furoate-vilanterol (BREO ELLIPTA) 200-25 MCG/INH AEPB inhaler Inhale 1 puff into the lungs daily      Handicap Placard [facility] by Does not apply route 1 each 0    Incontinence Supply Disposable (INCONTINENCE BRIEF LARGE) MISC To use as directed. Use 3x a day 90 each 3    polyethylene glycol (GLYCOLAX) powder Take 17 g by mouth daily 527 g 3    Foot Care Products (TRI-BALANCE ORTHOTICS MENS) MISC Provide insurance covered orthotics shoes, wear daily 2 each 0    albuterol (PROVENTIL) 2 MG tablet Use as needed 1 tablet 0    ipratropium-albuterol (DUONEB) 0.5-2.5 (3) MG/3ML SOLN nebulizer solution Inhale 1 vial into the lungs every 4 hours as needed       Armodafinil 200 MG TABS Take 1 tablet by mouth 2 times daily.  tiZANidine (ZANAFLEX) 4 MG tablet TAKE 1 TABLET BY MOUTH DAILY AS NEEDED 28 tablet 3    pantoprazole (PROTONIX) 40 MG tablet TAKE 1 TABLET BY MOUTH DAILY (Patient not taking: Reported on 3/5/2020) 28 tablet 3    D-Mannose 500 MG CAPS Take 500 mg by mouth 3 times daily 90 capsule 11    metoclopramide (REGLAN) 5 MG/5ML solution Take 5 mLs by mouth 4 times daily (before meals and nightly) (Patient not taking: Reported on 3/5/2020) 750 mL 3    ciclopirox (PENLAC) 8 % solution Apply topically nightly to nails (Patient not taking: Reported on 3/5/2020) 6 mL 11     No current facility-administered medications for this visit. Social History     Tobacco Use    Smoking status: Former Smoker     Packs/day: 1.00     Years: 20.00     Pack years: 20.00     Last attempt to quit: 1976     Years since quittin.2    Smokeless tobacco: Never Used   Substance Use Topics    Alcohol use: No    Drug use: No       Family History   Problem Relation Age of Onset    Cancer Mother     Heart Attack Father         REVIEW OFSYSTEMS:  Review of Systems   Constitutional: Negative for chills and fever. HENT: Positive for congestion. Respiratory: Positive for cough (non-productive) and shortness of breath. Negative for wheezing. Cardiovascular: Negative for chest pain. Gastrointestinal: Positive for diarrhea. Negative for constipation, nausea and vomiting. Genitourinary: Positive for urgency. Negative for dysuria and frequency. Musculoskeletal: Negative for back pain, myalgias and neck pain. Neurological: Negative for headaches. PHYSICAL EXAM:  Vitals:    20 1504   BP: 111/68   Site: Left Upper Arm   Position: Sitting   Cuff Size: Medium Adult   Pulse: 65   Temp: 97.5 °F (36.4 °C)   TempSrc: Oral   Weight: 207 lb 12.8 oz (94.3 kg)     BP Readings from Last 3 Encounters:   20 111/68   19 116/64   10/31/19 106/70        Physical Exam  Vitals signs reviewed.    Constitutional: MG per tablet; Take 1 tablet by mouth 2 times daily for 7 days    Muscle cramping    Chronic diastolic heart failure (HCC)  -     bumetanide (BUMEX) 1 MG tablet; Take 2 tablets on MWF, 1 tablet on all other days    Medication refill  -     potassium chloride (KLOR-CON M) 20 MEQ extended release tablet; Take 1 tablet by mouth 2 times daily  -     fluticasone (FLONASE) 50 MCG/ACT nasal spray; 2 sprays by Nasal route daily  -     guaiFENesin (SM MUCUS RELIEF) 600 MG extended release tablet; Take 1 tablet by mouth 2 times daily      FOLLOW UP AND INSTRUCTIONS:  Return in about 4 months (around 7/5/2020) for CHF. · Discussed use, benefit, and side effects of prescribed medications. Barriers to medication compliance addressed. All patient questions answered. Pt voiced understanding. · Patient given educational materials - see patient instructions    Electronically signed by Toiñto Hatch PA-C on 3/5/20 at 3:52 PM    This note is created with the assistance of a speech-recognition program. While intendingto generate a document that actually reflects the content of the visit, the document can still have some mistakes which may not have been identified and corrected by editing.

## 2020-03-05 NOTE — PATIENT INSTRUCTIONS
· Contact PCP office if symptoms do not improve after finishing antibiotic  · Take Bumex twice daily on Monday, Wednesday, Friday, take 1 tablet daily on Sunday, Tuesday, Thursday, Saturday  · Contact PCP office to update on muscle cramping in 14 days

## 2020-03-17 ENCOUNTER — TELEPHONE (OUTPATIENT)
Dept: PRIMARY CARE CLINIC | Age: 75
End: 2020-03-17

## 2020-03-17 ASSESSMENT — ENCOUNTER SYMPTOMS
VOMITING: 0
CONSTIPATION: 0
BACK PAIN: 0
NAUSEA: 0
WHEEZING: 0

## 2020-03-20 ENCOUNTER — TELEPHONE (OUTPATIENT)
Dept: PRIMARY CARE CLINIC | Age: 75
End: 2020-03-20

## 2020-03-20 DIAGNOSIS — R31.9 HEMATURIA, UNSPECIFIED TYPE: Primary | ICD-10-CM

## 2020-03-20 RX ORDER — NITROFURANTOIN 25; 75 MG/1; MG/1
100 CAPSULE ORAL 2 TIMES DAILY
Qty: 20 CAPSULE | Refills: 0 | Status: SHIPPED | OUTPATIENT
Start: 2020-03-20 | End: 2020-03-30

## 2020-03-31 RX ORDER — GABAPENTIN 300 MG/1
CAPSULE ORAL
Qty: 84 CAPSULE | Refills: 1 | Status: SHIPPED | OUTPATIENT
Start: 2020-03-31 | End: 2020-05-27

## 2020-03-31 NOTE — TELEPHONE ENCOUNTER
Health Maintenance   Topic Date Due    Shingles Vaccine (1 of 2) 10/31/1996    Potassium monitoring  09/26/2020    Creatinine monitoring  09/26/2020    Colon cancer screen colonoscopy  03/26/2023    Lipid screen  06/24/2024    DTaP/Tdap/Td vaccine (2 - Td) 04/22/2029    Flu vaccine  Completed    Pneumococcal 65+ years Vaccine  Completed    AAA screen  Completed    Hepatitis C screen  Completed    Hepatitis A vaccine  Aged Out    Hepatitis B vaccine  Aged Out    Hib vaccine  Aged Out    Meningococcal (ACWY) vaccine  Aged Out             (applicable per patient's age: Cancer Screenings, Depression Screening, Fall Risk Screening, Immunizations)    LDL Cholesterol (mg/dL)   Date Value   06/24/2019 65     LDL Calculated (mg/dL)   Date Value   10/30/2013 67     AST (U/L)   Date Value   09/25/2019 19     ALT (U/L)   Date Value   09/25/2019 10     BUN (mg/dL)   Date Value   09/26/2019 19      (goal A1C is < 7)   (goal LDL is <100) need 30-50% reduction from baseline     BP Readings from Last 3 Encounters:   03/05/20 111/68   11/11/19 116/64   10/31/19 106/70    (goal /80)      All Future Testing planned in [facility]:  Lab Frequency Next Occurrence   Clostridium Difficile Toxin/Antigen Once 04/02/2020       Next Visit Date:  Future Appointments   Date Time Provider Jeri Flynn   5/11/2020  1:15 PM Itzel Hernandez MD INFT DISEASE [facility]   7/16/2020  3:00 PM Chuckie Travis PA-C [address]            Patient Active Problem List:     Atrial fibrillation ([facility] [address]     Hyperlipidemia     GERD (gastroesophageal reflux disease)     Osteoarthritis of lumbar spine     OA (osteoarthritis) of knee, bilateral     Foot drop, right     Impaired hearing     Chronic rhinosinusitis     Lymphedema of both lower extremities     Hallux valgus, acquired, bilateral     Gait disorder     S/P revision of total knee     Hypertension     Slow transit constipation     Internal hemorrhoids     Calculus of gallbladder without cholecystitis without obstruction     MARIAMA on CPAP     Chronic diastolic heart failure (HCC)     Class 2 severe obesity due to excess calories with serious comorbidity and body mass index (BMI) of 36.0 to 36.9 in adult [facility])     Simple chronic bronchitis (HCC)     Acute superficial gastritis without hemorrhage     Gastric out let obstruction     Anastomotic stricture of stomach     Nausea & vomiting     E. coli septicemia (HCC)     E. coli UTI

## 2020-06-05 ENCOUNTER — TELEPHONE (OUTPATIENT)
Dept: PRIMARY CARE CLINIC | Age: 75
End: 2020-06-05

## 2020-06-05 RX ORDER — AMOXICILLIN 250 MG/1
500 CAPSULE ORAL 2 TIMES DAILY
Qty: 28 CAPSULE | Refills: 0 | Status: SHIPPED | OUTPATIENT
Start: 2020-06-05 | End: 2020-06-12

## 2020-06-05 RX ORDER — CEPHALEXIN 500 MG/1
500 CAPSULE ORAL 2 TIMES DAILY
Qty: 14 CAPSULE | Refills: 0 | Status: SHIPPED | OUTPATIENT
Start: 2020-06-05 | End: 2021-10-06 | Stop reason: SDUPTHER

## 2020-06-05 NOTE — TELEPHONE ENCOUNTER
Patient calling for sinus problems with headache and light sensitivity     Onset: 8 days ago    Severity: 1 for pain  Progression: gradually worsening  Relieved By:nothing  Radiation: to ears there are plugged    Associated Symptoms: coughing mucus it is yellowish brown  Have you been exposed to someone with similar symptoms no  Ok to leave a message if we call back yes    Patient is also experiencing UTI symptoms with mucus in his bowels. Patient is experiencing burning sensation with urination.

## 2020-06-27 ENCOUNTER — APPOINTMENT (OUTPATIENT)
Dept: GENERAL RADIOLOGY | Age: 75
DRG: 177 | End: 2020-06-27
Payer: MEDICARE

## 2020-06-27 ENCOUNTER — APPOINTMENT (OUTPATIENT)
Dept: CT IMAGING | Age: 75
DRG: 177 | End: 2020-06-27
Payer: MEDICARE

## 2020-06-27 ENCOUNTER — HOSPITAL ENCOUNTER (INPATIENT)
Age: 75
LOS: 6 days | Discharge: HOME HEALTH | DRG: 177 | End: 2020-07-03
Attending: EMERGENCY MEDICINE | Admitting: INTERNAL MEDICINE
Payer: MEDICARE

## 2020-06-27 PROBLEM — J15.9 HOSPITAL-ACQUIRED BACTERIAL PNEUMONIA: Status: ACTIVE | Noted: 2020-06-27

## 2020-06-27 LAB
-: NORMAL
ABSOLUTE EOS #: 0.21 K/UL (ref 0–0.44)
ABSOLUTE IMMATURE GRANULOCYTE: 0.1 K/UL (ref 0–0.3)
ABSOLUTE LYMPH #: 1.04 K/UL (ref 1.1–3.7)
ABSOLUTE MONO #: 1.56 K/UL (ref 0.1–1.2)
ALBUMIN SERPL-MCNC: 3.4 G/DL (ref 3.5–5.2)
ALBUMIN/GLOBULIN RATIO: 1 (ref 1–2.5)
ALP BLD-CCNC: 150 U/L (ref 40–129)
ALT SERPL-CCNC: 33 U/L (ref 5–41)
AMORPHOUS: NORMAL
ANION GAP SERPL CALCULATED.3IONS-SCNC: 11 MMOL/L (ref 9–17)
ANION GAP SERPL CALCULATED.3IONS-SCNC: 13 MMOL/L (ref 9–17)
AST SERPL-CCNC: 32 U/L
BACTERIA: NORMAL
BASOPHILS # BLD: 1 % (ref 0–2)
BASOPHILS ABSOLUTE: 0.1 K/UL (ref 0–0.2)
BILIRUB SERPL-MCNC: 1.09 MG/DL (ref 0.3–1.2)
BILIRUBIN URINE: NEGATIVE
BNP INTERPRETATION: ABNORMAL
BUN BLDV-MCNC: 14 MG/DL (ref 8–23)
BUN BLDV-MCNC: 15 MG/DL (ref 8–23)
BUN/CREAT BLD: ABNORMAL (ref 9–20)
BUN/CREAT BLD: ABNORMAL (ref 9–20)
CALCIUM SERPL-MCNC: 7.9 MG/DL (ref 8.6–10.4)
CALCIUM SERPL-MCNC: 8.5 MG/DL (ref 8.6–10.4)
CASTS UA: NORMAL /LPF (ref 0–8)
CHLORIDE BLD-SCNC: 95 MMOL/L (ref 98–107)
CHLORIDE BLD-SCNC: 95 MMOL/L (ref 98–107)
CO2: 25 MMOL/L (ref 20–31)
CO2: 29 MMOL/L (ref 20–31)
COLOR: YELLOW
COMMENT UA: ABNORMAL
CREAT SERPL-MCNC: 0.67 MG/DL (ref 0.7–1.2)
CREAT SERPL-MCNC: 0.69 MG/DL (ref 0.7–1.2)
CRYSTALS, UA: NORMAL /HPF
DIFFERENTIAL TYPE: ABNORMAL
EOSINOPHILS RELATIVE PERCENT: 2 % (ref 1–4)
EPITHELIAL CELLS UA: NORMAL /HPF (ref 0–5)
GFR AFRICAN AMERICAN: >60 ML/MIN
GFR AFRICAN AMERICAN: >60 ML/MIN
GFR NON-AFRICAN AMERICAN: >60 ML/MIN
GFR NON-AFRICAN AMERICAN: >60 ML/MIN
GFR SERPL CREATININE-BSD FRML MDRD: ABNORMAL ML/MIN/{1.73_M2}
GLUCOSE BLD-MCNC: 110 MG/DL (ref 70–99)
GLUCOSE BLD-MCNC: 98 MG/DL (ref 70–99)
GLUCOSE URINE: NEGATIVE
HCT VFR BLD CALC: 29.3 % (ref 40.7–50.3)
HCT VFR BLD CALC: 32 % (ref 40.7–50.3)
HEMOGLOBIN: 8.6 G/DL (ref 13–17)
HEMOGLOBIN: 9.6 G/DL (ref 13–17)
IMMATURE GRANULOCYTES: 1 %
INR BLD: 1.2
KETONES, URINE: NEGATIVE
LEUKOCYTE ESTERASE, URINE: ABNORMAL
LYMPHOCYTES # BLD: 10 % (ref 24–43)
MAGNESIUM: 2 MG/DL (ref 1.6–2.6)
MCH RBC QN AUTO: 26.8 PG (ref 25.2–33.5)
MCH RBC QN AUTO: 26.9 PG (ref 25.2–33.5)
MCHC RBC AUTO-ENTMCNC: 29.4 G/DL (ref 28.4–34.8)
MCHC RBC AUTO-ENTMCNC: 30 G/DL (ref 28.4–34.8)
MCV RBC AUTO: 89.6 FL (ref 82.6–102.9)
MCV RBC AUTO: 91.3 FL (ref 82.6–102.9)
MONOCYTES # BLD: 15 % (ref 3–12)
MORPHOLOGY: ABNORMAL
MUCUS: NORMAL
NITRITE, URINE: NEGATIVE
NRBC AUTOMATED: 0 PER 100 WBC
NRBC AUTOMATED: 0 PER 100 WBC
OTHER OBSERVATIONS UA: NORMAL
PARTIAL THROMBOPLASTIN TIME: 32.4 SEC (ref 20.5–30.5)
PDW BLD-RTO: 23.1 % (ref 11.8–14.4)
PDW BLD-RTO: 23.8 % (ref 11.8–14.4)
PH UA: 8.5 (ref 5–8)
PLATELET # BLD: 212 K/UL (ref 138–453)
PLATELET # BLD: 244 K/UL (ref 138–453)
PLATELET ESTIMATE: ABNORMAL
PMV BLD AUTO: 8.7 FL (ref 8.1–13.5)
PMV BLD AUTO: 8.9 FL (ref 8.1–13.5)
POTASSIUM SERPL-SCNC: 3.6 MMOL/L (ref 3.7–5.3)
POTASSIUM SERPL-SCNC: 3.7 MMOL/L (ref 3.7–5.3)
PRO-BNP: 4433 PG/ML
PROCALCITONIN: 0.11 NG/ML
PROTEIN UA: NEGATIVE
PROTHROMBIN TIME: 12.3 SEC (ref 9–12)
RBC # BLD: 3.21 M/UL (ref 4.21–5.77)
RBC # BLD: 3.57 M/UL (ref 4.21–5.77)
RBC # BLD: ABNORMAL 10*6/UL
RBC UA: NORMAL /HPF (ref 0–4)
RENAL EPITHELIAL, UA: NORMAL /HPF
SEG NEUTROPHILS: 71 % (ref 36–65)
SEGMENTED NEUTROPHILS ABSOLUTE COUNT: 7.39 K/UL (ref 1.5–8.1)
SODIUM BLD-SCNC: 133 MMOL/L (ref 135–144)
SODIUM BLD-SCNC: 135 MMOL/L (ref 135–144)
SPECIFIC GRAVITY UA: 1.01 (ref 1–1.03)
TOTAL PROTEIN: 6.8 G/DL (ref 6.4–8.3)
TRICHOMONAS: NORMAL
TROPONIN INTERP: ABNORMAL
TROPONIN INTERP: ABNORMAL
TROPONIN T: ABNORMAL NG/ML
TROPONIN T: ABNORMAL NG/ML
TROPONIN, HIGH SENSITIVITY: 49 NG/L (ref 0–22)
TROPONIN, HIGH SENSITIVITY: 54 NG/L (ref 0–22)
TURBIDITY: CLEAR
URINE HGB: ABNORMAL
UROBILINOGEN, URINE: NORMAL
WBC # BLD: 10.4 K/UL (ref 3.5–11.3)
WBC # BLD: 8.8 K/UL (ref 3.5–11.3)
WBC # BLD: ABNORMAL 10*3/UL
WBC UA: NORMAL /HPF (ref 0–5)
YEAST: NORMAL

## 2020-06-27 PROCEDURE — 71046 X-RAY EXAM CHEST 2 VIEWS: CPT

## 2020-06-27 PROCEDURE — 6370000000 HC RX 637 (ALT 250 FOR IP): Performed by: INTERNAL MEDICINE

## 2020-06-27 PROCEDURE — 84484 ASSAY OF TROPONIN QUANT: CPT

## 2020-06-27 PROCEDURE — 2580000003 HC RX 258: Performed by: EMERGENCY MEDICINE

## 2020-06-27 PROCEDURE — 2580000003 HC RX 258: Performed by: INTERNAL MEDICINE

## 2020-06-27 PROCEDURE — 85610 PROTHROMBIN TIME: CPT

## 2020-06-27 PROCEDURE — 36415 COLL VENOUS BLD VENIPUNCTURE: CPT

## 2020-06-27 PROCEDURE — 1200000000 HC SEMI PRIVATE

## 2020-06-27 PROCEDURE — 99285 EMERGENCY DEPT VISIT HI MDM: CPT

## 2020-06-27 PROCEDURE — 93005 ELECTROCARDIOGRAM TRACING: CPT | Performed by: EMERGENCY MEDICINE

## 2020-06-27 PROCEDURE — 80048 BASIC METABOLIC PNL TOTAL CA: CPT

## 2020-06-27 PROCEDURE — 83880 ASSAY OF NATRIURETIC PEPTIDE: CPT

## 2020-06-27 PROCEDURE — 6360000002 HC RX W HCPCS: Performed by: EMERGENCY MEDICINE

## 2020-06-27 PROCEDURE — 85025 COMPLETE CBC W/AUTO DIFF WBC: CPT

## 2020-06-27 PROCEDURE — 94660 CPAP INITIATION&MGMT: CPT

## 2020-06-27 PROCEDURE — 85730 THROMBOPLASTIN TIME PARTIAL: CPT

## 2020-06-27 PROCEDURE — 85027 COMPLETE CBC AUTOMATED: CPT

## 2020-06-27 PROCEDURE — 71260 CT THORAX DX C+: CPT

## 2020-06-27 PROCEDURE — 99223 1ST HOSP IP/OBS HIGH 75: CPT | Performed by: NURSE PRACTITIONER

## 2020-06-27 PROCEDURE — 6360000004 HC RX CONTRAST MEDICATION: Performed by: EMERGENCY MEDICINE

## 2020-06-27 PROCEDURE — 83735 ASSAY OF MAGNESIUM: CPT

## 2020-06-27 PROCEDURE — 84145 PROCALCITONIN (PCT): CPT

## 2020-06-27 PROCEDURE — 6360000002 HC RX W HCPCS: Performed by: INTERNAL MEDICINE

## 2020-06-27 PROCEDURE — 81001 URINALYSIS AUTO W/SCOPE: CPT

## 2020-06-27 PROCEDURE — 94640 AIRWAY INHALATION TREATMENT: CPT

## 2020-06-27 PROCEDURE — 80053 COMPREHEN METABOLIC PANEL: CPT

## 2020-06-27 RX ORDER — NICOTINE 21 MG/24HR
1 PATCH, TRANSDERMAL 24 HOURS TRANSDERMAL DAILY PRN
Status: DISCONTINUED | OUTPATIENT
Start: 2020-06-27 | End: 2020-07-03 | Stop reason: HOSPADM

## 2020-06-27 RX ORDER — PROMETHAZINE HYDROCHLORIDE 25 MG/1
12.5 TABLET ORAL EVERY 6 HOURS PRN
Status: DISCONTINUED | OUTPATIENT
Start: 2020-06-27 | End: 2020-07-03 | Stop reason: HOSPADM

## 2020-06-27 RX ORDER — SODIUM CHLORIDE 9 MG/ML
INJECTION, SOLUTION INTRAVENOUS CONTINUOUS
Status: DISCONTINUED | OUTPATIENT
Start: 2020-06-27 | End: 2020-07-03 | Stop reason: HOSPADM

## 2020-06-27 RX ORDER — ONDANSETRON 2 MG/ML
4 INJECTION INTRAMUSCULAR; INTRAVENOUS EVERY 6 HOURS PRN
Status: DISCONTINUED | OUTPATIENT
Start: 2020-06-27 | End: 2020-07-03 | Stop reason: HOSPADM

## 2020-06-27 RX ORDER — IPRATROPIUM BROMIDE AND ALBUTEROL SULFATE 2.5; .5 MG/3ML; MG/3ML
1 SOLUTION RESPIRATORY (INHALATION)
Status: DISCONTINUED | OUTPATIENT
Start: 2020-06-27 | End: 2020-07-03 | Stop reason: HOSPADM

## 2020-06-27 RX ORDER — ALBUTEROL SULFATE 2.5 MG/3ML
2.5 SOLUTION RESPIRATORY (INHALATION)
Status: DISCONTINUED | OUTPATIENT
Start: 2020-06-27 | End: 2020-07-03 | Stop reason: HOSPADM

## 2020-06-27 RX ORDER — SODIUM CHLORIDE 0.9 % (FLUSH) 0.9 %
10 SYRINGE (ML) INJECTION EVERY 12 HOURS SCHEDULED
Status: DISCONTINUED | OUTPATIENT
Start: 2020-06-27 | End: 2020-07-03 | Stop reason: HOSPADM

## 2020-06-27 RX ORDER — SODIUM CHLORIDE 0.9 % (FLUSH) 0.9 %
10 SYRINGE (ML) INJECTION PRN
Status: DISCONTINUED | OUTPATIENT
Start: 2020-06-27 | End: 2020-07-03 | Stop reason: HOSPADM

## 2020-06-27 RX ADMIN — IPRATROPIUM BROMIDE AND ALBUTEROL SULFATE 1 AMPULE: .5; 3 SOLUTION RESPIRATORY (INHALATION) at 20:49

## 2020-06-27 RX ADMIN — PIPERACILLIN SODIUM AND TAZOBACTAM SODIUM 4.5 G: 4; .5 INJECTION, POWDER, LYOPHILIZED, FOR SOLUTION INTRAVENOUS at 15:42

## 2020-06-27 RX ADMIN — VANCOMYCIN HYDROCHLORIDE 1250 MG: 10 INJECTION, POWDER, LYOPHILIZED, FOR SOLUTION INTRAVENOUS at 18:24

## 2020-06-27 RX ADMIN — IOHEXOL 75 ML: 350 INJECTION, SOLUTION INTRAVENOUS at 14:09

## 2020-06-27 RX ADMIN — PIPERACILLIN AND TAZOBACTAM 3.38 G: 3; .375 INJECTION, POWDER, FOR SOLUTION INTRAVENOUS at 20:47

## 2020-06-27 RX ADMIN — Medication 10 ML: at 20:47

## 2020-06-27 RX ADMIN — ENOXAPARIN SODIUM 40 MG: 40 INJECTION SUBCUTANEOUS at 18:24

## 2020-06-27 RX ADMIN — SODIUM CHLORIDE: 9 INJECTION, SOLUTION INTRAVENOUS at 18:29

## 2020-06-27 ASSESSMENT — ENCOUNTER SYMPTOMS
SHORTNESS OF BREATH: 1
COUGH: 1
SORE THROAT: 1
NAUSEA: 0
VOMITING: 0
ABDOMINAL PAIN: 0

## 2020-06-27 NOTE — ED TRIAGE NOTES
Pt presents to ED via wheelchair accompanied by wife c/o ongoing shortness of breath and cough. Pt states he was discharged yesterday from [facility] for pneumonia. Pt states when he was discharged he was still c/o shortness of breath. Pt states at times theres scant amount of blood with cough. Pt denies any chest pain, nausea, vomiting or diarrhea. Pt is c/o bilateral ear pain x2 weeks. Pt placed on full cardiac monitor. Dr. Donnell Bravo at bedside evaluating patient.

## 2020-06-27 NOTE — PROGRESS NOTES
I was assigned to this patient in error. I did not evaluate or treat this patient during this emergency department encounter.     Parminder Davis DO, PGY-1  Emergency Medicine Resident  6/27/2020     6:55 PM

## 2020-06-27 NOTE — ED PROVIDER NOTES
Tomy [facility]o ONC/MED SURG  Emergency Department Encounter  EmergencyMedicine Resident     Pt Name:Levi Whitney  MRN: [id number]  [place] 1945  Date of evaluation: 6/27/20  PCP:  Lorrie Bradley [address]       Chief Complaint   Patient presents with    Shortness of Breath    Cough       HISTORY OF PRESENT ILLNESS  (Location/Symptom, Timing/Onset, Context/Setting, Quality, Duration, Modifying Factors, Severity.)      Michelle Enriquez is a 69 y.o. male who presents to the emergency department with recurrent hemoptysis and dyspnea. Patient was discharged yesterday from cardiac intensive care at [address] after a 2-week stay for acute on chronic diastolic heart failure with symptomatic shortness of breath, acute severe bradycardia with concern for metoprolol toxicity treated initially with dopamine drip and glucagon, subsequently treated for fluid overload with diuresis. States that over the past 4 to 5 days he developed intermittent episodes of hemoptysis happening approximately twice daily without hematemesis. He shows the blood clot in the tissue paper that he coughed up this morning, approximately teaspoon's worth. He was treated for COPD exacerbation during his last hospital stay with IV and oral steroids, prior pneumonia on June 14. COVID test at that time was negative. He also endorses some shortness of breath mildly improved with home nebulizer and albuterol treatments. He denies subjective fever, chills, nausea, vomiting, chest pain, abdominal pain, pain with urination, or problems with bowel movements other than some irregularity. States that he has some scant blood in his urine which is chronic for him.     PAST MEDICAL / SURGICAL / SOCIAL / FAMILY HISTORY      has a past medical history of Acute superficial gastritis without hemorrhage, Anastomotic stricture of stomach, Asthma, Atrial fibrillation ([facility] [address] Calculus of gallbladder without cholecystitis without obstruction, Chronic diastolic heart failure (HCC), Chronic rhinosinusitis, Class 2 severe obesity due to excess calories with serious comorbidity and body mass index (BMI) of 36.0 to 36.9 in adult [facility]), E. coli septicemia ([facility] [address] E. coli UTI, Foot drop, right, Fracture of femur ([facility] [address] Gait disorder, Gastric out let obstruction, GERD (gastroesophageal reflux disease), Hallux valgus, acquired, bilateral, Hyperlipidemia, Hypertension, Impaired hearing, Internal hemorrhoids, Lymphedema of both lower extremities, Nausea & vomiting, OA (osteoarthritis) of knee, bilateral, Obesity, MARIAMA on CPAP, Osteoarthritis, Osteoarthritis of lumbar spine, S/P revision of total knee, Septicemia due to E. coli ([facility]), Simple chronic bronchitis ([facility] [address] Slow transit constipation, Unspecified sleep apnea, and UTI (urinary tract infection). has a past surgical history that includes Finger amputation (1966); Gastric bypass surgery (1985); Carpal tunnel release; Colonoscopy; Rotator cuff repair; joint replacement (2004 & 2005); Hemorrhoid surgery; pr egd transoral biopsy single/multiple (N/A, 5/25/2018); Upper gastrointestinal endoscopy (08/13/2018); Upper gastrointestinal endoscopy (N/A, 8/13/2018); Upper gastrointestinal endoscopy (N/A, 8/13/2018); Upper gastrointestinal endoscopy (08/16/2018); pr egd flexible foreign body removal (N/A, 8/16/2018); Upper gastrointestinal endoscopy (8/16/2018); Upper gastrointestinal endoscopy (N/A, 10/1/2018); Upper gastrointestinal endoscopy (10/1/2018); Upper gastrointestinal endoscopy (N/A, 11/19/2018); Cystoscopy (01/02/2019); and Cystoscopy (N/A, 1/2/2019).     Social History     Socioeconomic History    Marital status:      Spouse name: Not on file    Number of children: Not on file    Years of education: Not on file    Highest education level: Not on file   Occupational History    Not on file   Social Needs    Financial resource strain: Not on file    Food insecurity     Worry: Not on file Inability: Not on file    Transportation needs     Medical: Not on file     Non-medical: Not on file   Tobacco Use    Smoking status: Former Smoker     Packs/day: 1.00     Years: 20.00     Pack years: 20.00     Last attempt to quit: 1976     Years since quittin.5    Smokeless tobacco: Never Used   Substance and Sexual Activity    Alcohol use: No    Drug use: No    Sexual activity: Not on file   Lifestyle    Physical activity     Days per week: Not on file     Minutes per session: Not on file    Stress: Not on file   Relationships    Social connections     Talks on phone: Not on file     Gets together: Not on file     Attends [employer] service: Not on file     Active member of club or organization: Not on file     Attends meetings of clubs or organizations: Not on file     Relationship status: Not on file    Intimate partner violence     Fear of current or ex partner: Not on file     Emotionally abused: Not on file     Physically abused: Not on file     Forced sexual activity: Not on file   Other Topics Concern    Not on file   Social History Narrative    Not on file       Family History   Problem Relation Age of Onset    Cancer Mother     Heart Attack Father        Allergies:  Darvocet [propoxyphene n-acetaminophen]; Other; and Oxycodone-acetaminophen    Home Medications:  Prior to Admission medications    Medication Sig Start Date End Date Taking?  Authorizing Provider   spironolactone (ALDACTONE) 25 MG tablet TAKE 1 TABLET BY MOUTH DAILY 20  Yes Keyona Vo PA-C   gabapentin (NEURONTIN) 300 MG capsule TAKE 1 CAPSULE BY MOUTH THREE TIMES DAILY 20 Yes Keyona Vo PA-C   cetirizine (ZYRTEC) 10 MG tablet TAKE 1 TABLET BY MOUTH DAILY 20  Yes Keyona Vo PA-C   rOPINIRole (REQUIP) 0.25 MG tablet TAKE 1 TABLET BY MOUTH TWICE DAILY 20  Yes Keyona Vo PA-C   rivaroxaban (XARELTO) 20 MG TABS tablet TAKE 1 TABLET BY MOUTH DAILY 20  Yes Byron Sen PA-C   metoprolol succinate (TOPROL XL) 25 MG extended release tablet TAKE 1 TABLET BY MOUTH DAILY 5/27/20  Yes Byron Sen PA-C   bumetanide (BUMEX) 1 MG tablet Take 2 tablets on MWF, 1 tablet on all other days 3/5/20  Yes Byron Sen PA-C   potassium chloride (KLOR-CON M) 20 MEQ extended release tablet Take 1 tablet by mouth 2 times daily 3/5/20  Yes Byron Sen PA-C   fluticasone [facility]) 50 MCG/ACT nasal spray 2 sprays by Nasal route daily 3/5/20  Yes Byron Sen PA-C   guaiFENesin (SM MUCUS RELIEF) 600 MG extended release tablet Take 1 tablet by mouth 2 times daily 3/5/20  Yes Byron Sen PA-C   Multiple Vitamin (MULTI-VITAMINS) TABS TAKE 1 TABLET BY MOUTH DAILY 2/6/20  Yes Byron Sen PA-C   vitamin B-12 (CYANOCOBALAMIN) 100 MCG tablet TAKE 1 TABLET BY MOUTH DAILY AS NEEDED FOR FATIGUE 1/21/20  Yes Byron Sen PA-C   SM LUBRICANT EYE DROPS 0.4-0.3 % ophthalmic solution PLACE 1 DROP INTO BOTH EYES FOUR TIMES DAILY AS NEEDED FOR DRY EYES 10/17/19  Yes Byron Sen PA-C   Fluticasone furoate-vilanterol (BREO ELLIPTA) 200-25 MCG/INH AEPB inhaler Inhale 1 puff into the lungs daily   Yes Historical Provider, MD   Handicap Placard MISC by Does not apply route 6/27/19  Yes Byron Sen PA-C   Incontinence Supply Disposable (INCONTINENCE BRIEF LARGE) MISC To use as directed.  Use 3x a day 4/30/19  Yes Byron Sen PA-C   polyethylene glycol [facility]) powder Take 17 g by mouth daily 3/5/19  Yes Byron Sen PA-C   [address] ([employer] MENS) [address] [facility] Provide insurance covered orthotics shoes, wear daily 12/12/18  Yes Byron Sen PA-C   albuterol (PROVENTIL) 2 MG tablet Use as needed 10/4/18  Yes Shruti Anderson MD   ipratropium-albuterol (DUONEB) 0.5-2.5 (3) MG/3ML SOLN nebulizer solution Inhale 1 vial into the lungs every 4 hours as needed    Yes Historical Provider, MD   Armodafinil 200 MG TABS Take 1 sounds. No murmur. No friction rub. No gallop. Pulmonary:      Effort: Pulmonary effort is normal. No respiratory distress. Breath sounds: Normal breath sounds. No wheezing, rhonchi or rales. Abdominal:      General: Abdomen is flat. There is no distension. Palpations: Abdomen is soft. Tenderness: There is no abdominal tenderness. There is no guarding or rebound. Musculoskeletal: Normal range of motion. General: No swelling, deformity or signs of injury. Skin:     General: Skin is warm and dry. Capillary Refill: Capillary refill takes less than 2 seconds. Coloration: Skin is not jaundiced. Findings: No bruising or lesion. Neurological:      General: No focal deficit present. Mental Status: He is alert and oriented to person, place, and time. Mental status is at baseline. Motor: No abnormal muscle tone.          DIFFERENTIAL  DIAGNOSIS     PLAN (LABS / IMAGING / EKG):  Orders Placed This Encounter   Procedures    Sputum gram stain    Respiratory Culture    MRSA DNA Probe, Nasal    XR CHEST STANDARD (2 VW)    CT CHEST PULMONARY EMBOLISM W CONTRAST    XR CHEST STANDARD (2 VW)    CBC Auto Differential    Comprehensive Metabolic Panel w/ Reflex to MG    Troponin    Brain Natriuretic Peptide    Protime-INR    APTT    URINALYSIS    Troponin    Microscopic Urinalysis    Basic Metabolic Panel w/ Reflex to MG    CBC    DIET LOW SODIUM 2 GM;    Vital signs per unit routine    Notify physician    Up as tolerated    Daily weights    Intake and output    Encourage deep breathing and coughing every two hours while awake    Place intermittent pneumatic compression device    Height and weight    Full Code    Inpatient consult to [address] [place] [address] St to Dose: Vancomycin    OT eval and treat    PT evaluation and treat    Initiate Oxygen Therapy Protocol    Pulse Oximetry Spot Check    Respiratory care evaluation only    HHN Treatment    HHN Treatment    EKG 12 Lead    PATIENT STATUS (FROM ED OR OR/PROCEDURAL) Inpatient    PATIENT STATUS (FROM ED OR OR/PROCEDURAL) Inpatient       MEDICATIONS ORDERED:  Orders Placed This Encounter   Medications    iohexol (OMNIPAQUE 350) solution 75 mL    DISCONTD: vancomycin (VANCOCIN) 1500 mg in dextrose 5 % 250 mL IVPB    piperacillin-tazobactam (ZOSYN) 4.5 g in dextrose 5 % 100 mL IVPB (mini-bag)    0.9 % sodium chloride infusion    sodium chloride flush 0.9 % injection 10 mL    sodium chloride flush 0.9 % injection 10 mL    magnesium hydroxide (MILK OF MAGNESIA) 400 MG/5ML suspension 30 mL    OR Linked Order Group     promethazine (PHENERGAN) tablet 12.5 mg     ondansetron (ZOFRAN) injection 4 mg    nicotine (NICODERM CQ) 21 MG/24HR 1 patch     If indicated/pateint smokes    enoxaparin (LOVENOX) injection 40 mg    albuterol (PROVENTIL) nebulizer solution 2.5 mg    ipratropium-albuterol (DUONEB) nebulizer solution 1 ampule    DISCONTD: vancomycin (VANCOCIN) 1500 mg in dextrose 5 % 250 mL IVPB    piperacillin-tazobactam (ZOSYN) 3.375 g in dextrose 5 % 50 mL IVPB extended infusion (mini-bag)    vancomycin (VANCOCIN) intermittent dosing (placeholder)    vancomycin (VANCOCIN) 1250 mg in dextrose 5 % 250 mL IVPB       DDX: Yoana Canela is a 69 y.o. male who presents to the emergency department with a four-day history of hemoptysis.  Differential diagnosis includes pneumonia, pneumothorax, sepsis, hematemesis, URI    DIAGNOSTIC RESULTS / EMERGENCY DEPARTMENT COURSE / MDM   LAB RESULTS:  Results for orders placed or performed during the hospital encounter of 06/27/20   CBC Auto Differential   Result Value Ref Range    WBC 10.4 3.5 - 11.3 k/uL    RBC 3.57 (L) 4.21 - 5.77 m/uL    Hemoglobin 9.6 (L) 13.0 - 17.0 g/dL    Hematocrit 32.0 (L) 40.7 - 50.3 %    MCV 89.6 82.6 - 102.9 fL    MCH 26.9 25.2 - 33.5 pg    MCHC 30.0 28.4 - 34.8 g/dL    RDW 23.1 (H) 11.8 - 14.4 %    Platelets 159 348 - 027 k/uL MPV 8.9 8.1 - 13.5 fL    NRBC Automated 0.0 0.0 per 100 WBC    Differential Type NOT REPORTED     WBC Morphology NOT REPORTED     RBC Morphology NOT REPORTED     Platelet Estimate NOT REPORTED     Immature Granulocytes 1 (H) 0 %    Seg Neutrophils 71 (H) 36 - 65 %    Lymphocytes 10 (L) 24 - 43 %    Monocytes 15 (H) 3 - 12 %    Eosinophils % 2 1 - 4 %    Basophils 1 0 - 2 %    Absolute Immature Granulocyte 0.10 0.00 - 0.30 k/uL    Segs Absolute 7.39 1.50 - 8.10 k/uL    Absolute Lymph # 1.04 (L) 1.10 - 3.70 k/uL    Absolute Mono # 1.56 (H) 0.10 - 1.20 k/uL    Absolute Eos # 0.21 0.00 - 0.44 k/uL    Basophils Absolute 0.10 0.00 - 0.20 k/uL    Morphology ANISOCYTOSIS PRESENT    Comprehensive Metabolic Panel w/ Reflex to MG   Result Value Ref Range    Glucose 98 70 - 99 mg/dL    BUN 15 8 - 23 mg/dL    CREATININE 0.67 (L) 0.70 - 1.20 mg/dL    Bun/Cre Ratio NOT REPORTED 9 - 20    Calcium 8.5 (L) 8.6 - 10.4 mg/dL    Sodium 135 135 - 144 mmol/L    Potassium 3.6 (L) 3.7 - 5.3 mmol/L    Chloride 95 (L) 98 - 107 mmol/L    CO2 29 20 - 31 mmol/L    Anion Gap 11 9 - 17 mmol/L    Alkaline Phosphatase 150 (H) 40 - 129 U/L    ALT 33 5 - 41 U/L    AST 32 <40 U/L    Total Bilirubin 1.09 0.3 - 1.2 mg/dL    Total Protein 6.8 6.4 - 8.3 g/dL    Alb 3.4 (L) 3.5 - 5.2 g/dL    Albumin/Globulin Ratio 1.0 1.0 - 2.5    GFR Non-African American >60 >60 mL/min    GFR African American >60 >60 mL/min    GFR Comment          GFR Staging NOT REPORTED    Troponin   Result Value Ref Range    Troponin, High Sensitivity 54 (HH) 0 - 22 ng/L    Troponin T NOT REPORTED <0.03 ng/mL    Troponin Interp NOT REPORTED    Brain Natriuretic Peptide   Result Value Ref Range    Pro-BNP 4,433 (H) <300 pg/mL    BNP Interpretation Pro-BNP Reference Range:    Protime-INR   Result Value Ref Range    Protime 12.3 (H) 9.0 - 12.0 sec    INR 1.2    APTT   Result Value Ref Range    PTT 32.4 (H) 20.5 - 30.5 sec   URINALYSIS   Result Value Ref Range    Color, UA YELLOW YELLOW Turbidity UA CLEAR CLEAR    Glucose, Ur NEGATIVE NEGATIVE    Bilirubin Urine NEGATIVE NEGATIVE    Ketones, Urine NEGATIVE NEGATIVE    Specific Gravity, UA 1.006 1.005 - 1.030    Urine Hgb TRACE (A) NEGATIVE    pH, UA 8.5 (H) 5.0 - 8.0    Protein, UA NEGATIVE NEGATIVE    Urobilinogen, Urine Normal Normal    Nitrite, Urine NEGATIVE NEGATIVE    Leukocyte Esterase, Urine TRACE (A) NEGATIVE    Urinalysis Comments NOT REPORTED    Troponin   Result Value Ref Range    Troponin, High Sensitivity 49 (H) 0 - 22 ng/L    Troponin T NOT REPORTED <0.03 ng/mL    Troponin Interp NOT REPORTED    Microscopic Urinalysis   Result Value Ref Range    -          WBC, UA None 0 - 5 /HPF    RBC, UA 2 TO 5 0 - 4 /HPF    Casts UA NOT REPORTED 0 - 8 /LPF    Crystals, UA NOT REPORTED None /HPF    Epithelial Cells UA None 0 - 5 /HPF    Renal Epithelial, UA NOT REPORTED 0 /HPF    Bacteria, UA NOT REPORTED None    Mucus, UA NOT REPORTED None    Trichomonas, UA NOT REPORTED None    Amorphous, UA NOT REPORTED None    Other Observations UA NOT REPORTED NOT REQ. Yeast, UA NOT REPORTED None       IMPRESSION: Joyce Valente is a 69 y.o. male who presents to the emergency department with a 4-day history of hemoptysis. On examination his vital signs unremarkable and his examination is significant for an otherwise well-appearing older gentleman with clear heart and lung sounds. Given significant comorbidities, plan for CT PE, CBC, CMP, EKG, chest x-ray, urinalysis, coags given patient is on Xarelto, disposition pending labs. RADIOLOGY:  No results found.     EKG  EKG Interpretation    Interpreted by emergency department physician    Rhythm: atrial fibrillation - controlled  Rate: normal  Axis: right  Ectopy: ventricular escape complex  Conduction: left posterior fasciclar block  ST Segments: no acute change  T Waves: inversion in  v1, v2, v3, v4, v5, III and aVf  Q Waves: none    Clinical Impression: Nonspecific EKG - afib with one ectopic complex    Bill Larios MD      All EKG's are interpreted by the Emergency Department Physician who either signs or co-signs this chart in the absence of a cardiologist.    EMERGENCY DEPARTMENT COURSE:  ED Course as of Jun 27 1837   Sat Jun 27, 2020   1217 9.4 9mo ago   Hemoglobin Quant(!): 9.6 [TS]   1242 56 9mo ago   Troponin, High Sensitivity(!!): 54 [TS]   1428 Negative trend   Troponin, High Sensitivity(!): 49 [TS]   1516 Patient and family advised of finding of pneumonia and need for admission. Verbalized understanding and agreement with plan. Blood pressure systolic 88, heart rate 77, plan for small amount of IV fluids, low-salt dysphagia diet. [TS]      ED Course User Index  [TS] Ganesh Puentes MD       PROCEDURES:  None    CONSULTS:  IP CONSULT TO HOSPITALIST  IP CONSULT TO PHARMACY    CRITICAL CARE:  Please see attending note. FINAL IMPRESSION      1. Pneumonia due to organism          DISPOSITION / PLAN     DISPOSITION        PATIENT REFERRED TO:  Dionisio Harris PA-C  [address] [facility]  [phone]            DISCHARGE MEDICATIONS:  Current Discharge Medication List          Ganesh Puentes MD  Emergency Medicine Resident    This patient was evaluated in the Emergency Department for symptoms described in the history of present illness. He/she was evaluated in the context of the global COVID-19 pandemic, which necessitated consideration that the patient might be at risk for infection with the SARS-CoV-2 virus that causes COVID-19. Institutional protocols and algorithms that pertain to the evaluation of patients at risk for COVID-19 are in a state of rapid change based on information released by regulatory bodies including the CDC and federal and state organizations. These policies and algorithms were followed during the patient's care in the ED.     (Please note that portions of thisnote were completed with a voice recognition program.  Efforts were made to edit the dictations but occasionally words are mis-transcribed.)        Ganesh Puentes MD  Resident  06/27/20 8343

## 2020-06-27 NOTE — ED PROVIDER NOTES
Emergency Medicine Attending Note    I have seen and examined the patient in conjunction with the Resident/MLP. Please see my key portion documented:      I agree with the assessment and plan as discussed with Dr. Miguel Reece. Electronically signed:  Ale Flores M.D.            Diana Lugo MD  06/27/20 1664

## 2020-06-27 NOTE — PROGRESS NOTES
Pharmacy Note  Vancomycin Consult    Etelvina Tovar is a 69 y.o. male started on Vancomycin for suspected hospital acquired pneumonia; consult received from Dr. Lan Gross to manage therapy. Also receiving the following antibiotics: piperacillin/tazobactam.    Patient Active Problem List   Diagnosis    Atrial fibrillation (HCC)    Hyperlipidemia    GERD (gastroesophageal reflux disease)    Osteoarthritis of lumbar spine    OA (osteoarthritis) of knee, bilateral    Foot drop, right    Impaired hearing    Chronic rhinosinusitis    Lymphedema of both lower extremities    Hallux valgus, acquired, bilateral    Gait disorder    S/P revision of total knee    Hypertension    Slow transit constipation    Internal hemorrhoids    Calculus of gallbladder without cholecystitis without obstruction    MARIAMA on CPAP    Chronic diastolic heart failure ([facility])    Class 2 severe obesity due to excess calories with serious comorbidity and body mass index (BMI) of 36.0 to 36.9 in adult ([facility])    Simple chronic bronchitis ([facility])    Acute superficial gastritis without hemorrhage    Gastric out let obstruction    Anastomotic stricture of stomach    Nausea & vomiting    E. coli septicemia ([facility])    E. coli UTI    Hospital-acquired bacterial pneumonia       Allergies:  Darvocet [propoxyphene n-acetaminophen]; Other; and Oxycodone-acetaminophen     Temp max: 99.2F    Recent Labs     06/27/20  1150   BUN 15       Recent Labs     06/27/20  1150   CREATININE 0.67*       Recent Labs     06/27/20  1150   WBC 10.4       No intake or output data in the 24 hours ending 06/27/20 1746    Culture Date      Source                       Results  06.27.20              Sputum                       Pend  06.27.20              MRSA Nares               Pend    Ht Readings from Last 1 Encounters:   06/27/20 5' 10\" (1.778 m)        Wt Readings from Last 1 Encounters:   06/27/20 220 lb (99.8 kg)         Body mass index is 31.57 kg/m².     Estimated Creatinine

## 2020-06-27 NOTE — ED NOTES
Report given to [facility] Heath HAGAN [place], RACHEL  06/27/20 8348

## 2020-06-27 NOTE — ED NOTES
Dr. Isauro Hudson at bedside updating pt and wife on CT results and the decision to admit.  Pt agreeable to plan     Lev Romero RN  06/27/20 1912

## 2020-06-27 NOTE — PROGRESS NOTES
[address] HANDOFF       Handoff taken on the following patient from prior Attending Physician:  Pt Name: Lyn [facility]  PCP:  Urban Pettit PA-C    Attestation  I was available and discussed any additional care issues that arose and coordinated the management plans with the resident(s) caring for the patient during my duty period. Any areas of disagreement with resident's documentation of care or procedures are noted on the chart. I was personally present for the key portions of any/all procedures during my duty period. I have documented in the chart those procedures where I was not present during the key portions. CHIEF COMPLAINT       Chief Complaint   Patient presents with    Shortness of Breath    Cough         CURRENT MEDICATIONS     Previous Medications  Previous Medications    ALBUTEROL (PROVENTIL) 2 MG TABLET    Use as needed    ARMODAFINIL 200 MG TABS    Take 1 tablet by mouth 2 times daily. BUMETANIDE (BUMEX) 1 MG TABLET    Take 2 tablets on MWF, 1 tablet on all other days    CETIRIZINE (ZYRTEC) 10 MG TABLET    TAKE 1 TABLET BY MOUTH DAILY    D-MANNOSE 500 MG CAPS    Take 500 mg by mouth 3 times daily    FLUTICASONE (FLONASE) 50 MCG/ACT NASAL SPRAY    2 sprays by Nasal route daily    FLUTICASONE FUROATE-VILANTEROL (BREO ELLIPTA) 200-25 MCG/INH AEPB INHALER    Inhale 1 puff into the lungs daily    [employer] ([employer]THOT[employer] MENS) MISC    Provide insurance covered orthotics shoes, wear daily    GABAPENTIN (NEURONTIN) 300 MG CAPSULE    TAKE 1 CAPSULE BY MOUTH THREE TIMES DAILY    GUAIFENESIN (SM MUCUS RELIEF) 600 MG EXTENDED RELEASE TABLET    Take 1 tablet by mouth 2 times daily    HANDICAP PLACARD MISC    by Does not apply route    INCONTINENCE SUPPLY DISPOSABLE (INCONTINENCE BRIEF LARGE) MISC    To use as directed.  Use 3x a day    IPRATROPIUM-ALBUTEROL (DUONEB) 0.5-2.5 (3) MG/3ML SOLN NEBULIZER SOLUTION    Inhale 1 vial into the lungs every

## 2020-06-28 ENCOUNTER — APPOINTMENT (OUTPATIENT)
Dept: GENERAL RADIOLOGY | Age: 75
DRG: 177 | End: 2020-06-28
Payer: MEDICARE

## 2020-06-28 PROBLEM — K29.00 ACUTE SUPERFICIAL GASTRITIS WITHOUT HEMORRHAGE: Status: RESOLVED | Noted: 2018-05-26 | Resolved: 2020-06-28

## 2020-06-28 PROBLEM — K64.8 INTERNAL HEMORRHOIDS: Status: RESOLVED | Noted: 2017-01-03 | Resolved: 2020-06-28

## 2020-06-28 PROBLEM — R11.2 NAUSEA & VOMITING: Status: RESOLVED | Noted: 2018-11-16 | Resolved: 2020-06-28

## 2020-06-28 PROBLEM — K80.20 CALCULUS OF GALLBLADDER WITHOUT CHOLECYSTITIS WITHOUT OBSTRUCTION: Status: RESOLVED | Noted: 2017-05-02 | Resolved: 2020-06-28

## 2020-06-28 LAB
ANION GAP SERPL CALCULATED.3IONS-SCNC: 11 MMOL/L (ref 9–17)
BUN BLDV-MCNC: 14 MG/DL (ref 8–23)
BUN/CREAT BLD: ABNORMAL (ref 9–20)
CALCIUM SERPL-MCNC: 8 MG/DL (ref 8.6–10.4)
CHLORIDE BLD-SCNC: 96 MMOL/L (ref 98–107)
CO2: 27 MMOL/L (ref 20–31)
CREAT SERPL-MCNC: 0.74 MG/DL (ref 0.7–1.2)
GFR AFRICAN AMERICAN: >60 ML/MIN
GFR NON-AFRICAN AMERICAN: >60 ML/MIN
GFR SERPL CREATININE-BSD FRML MDRD: ABNORMAL ML/MIN/{1.73_M2}
GFR SERPL CREATININE-BSD FRML MDRD: ABNORMAL ML/MIN/{1.73_M2}
GLUCOSE BLD-MCNC: 87 MG/DL (ref 70–99)
HCT VFR BLD CALC: 28.9 % (ref 40.7–50.3)
HEMOGLOBIN: 8.5 G/DL (ref 13–17)
MCH RBC QN AUTO: 26.6 PG (ref 25.2–33.5)
MCHC RBC AUTO-ENTMCNC: 29.4 G/DL (ref 28.4–34.8)
MCV RBC AUTO: 90.6 FL (ref 82.6–102.9)
NRBC AUTOMATED: 0 PER 100 WBC
PDW BLD-RTO: 23.9 % (ref 11.8–14.4)
PLATELET # BLD: 199 K/UL (ref 138–453)
PMV BLD AUTO: 8.6 FL (ref 8.1–13.5)
POTASSIUM SERPL-SCNC: 3.7 MMOL/L (ref 3.7–5.3)
RBC # BLD: 3.19 M/UL (ref 4.21–5.77)
SODIUM BLD-SCNC: 134 MMOL/L (ref 135–144)
WBC # BLD: 7.2 K/UL (ref 3.5–11.3)

## 2020-06-28 PROCEDURE — 97530 THERAPEUTIC ACTIVITIES: CPT

## 2020-06-28 PROCEDURE — 36415 COLL VENOUS BLD VENIPUNCTURE: CPT

## 2020-06-28 PROCEDURE — 6370000000 HC RX 637 (ALT 250 FOR IP): Performed by: INTERNAL MEDICINE

## 2020-06-28 PROCEDURE — 97535 SELF CARE MNGMENT TRAINING: CPT

## 2020-06-28 PROCEDURE — 1200000000 HC SEMI PRIVATE

## 2020-06-28 PROCEDURE — 2580000003 HC RX 258: Performed by: INTERNAL MEDICINE

## 2020-06-28 PROCEDURE — 71046 X-RAY EXAM CHEST 2 VIEWS: CPT

## 2020-06-28 PROCEDURE — 99232 SBSQ HOSP IP/OBS MODERATE 35: CPT | Performed by: INTERNAL MEDICINE

## 2020-06-28 PROCEDURE — 6360000002 HC RX W HCPCS: Performed by: INTERNAL MEDICINE

## 2020-06-28 PROCEDURE — 94640 AIRWAY INHALATION TREATMENT: CPT

## 2020-06-28 PROCEDURE — 94660 CPAP INITIATION&MGMT: CPT

## 2020-06-28 PROCEDURE — 85027 COMPLETE CBC AUTOMATED: CPT

## 2020-06-28 PROCEDURE — 97166 OT EVAL MOD COMPLEX 45 MIN: CPT

## 2020-06-28 PROCEDURE — 80048 BASIC METABOLIC PNL TOTAL CA: CPT

## 2020-06-28 PROCEDURE — 99223 1ST HOSP IP/OBS HIGH 75: CPT | Performed by: INTERNAL MEDICINE

## 2020-06-28 PROCEDURE — 6360000002 HC RX W HCPCS: Performed by: NURSE PRACTITIONER

## 2020-06-28 PROCEDURE — 97162 PT EVAL MOD COMPLEX 30 MIN: CPT

## 2020-06-28 RX ORDER — CIPROFLOXACIN 750 MG/1
750 TABLET, FILM COATED ORAL EVERY 12 HOURS SCHEDULED
Status: DISCONTINUED | OUTPATIENT
Start: 2020-06-28 | End: 2020-07-03 | Stop reason: HOSPADM

## 2020-06-28 RX ADMIN — IPRATROPIUM BROMIDE AND ALBUTEROL SULFATE 1 AMPULE: .5; 3 SOLUTION RESPIRATORY (INHALATION) at 20:34

## 2020-06-28 RX ADMIN — VANCOMYCIN HYDROCHLORIDE 1250 MG: 10 INJECTION, POWDER, LYOPHILIZED, FOR SOLUTION INTRAVENOUS at 06:11

## 2020-06-28 RX ADMIN — ONDANSETRON 4 MG: 2 INJECTION INTRAMUSCULAR; INTRAVENOUS at 18:40

## 2020-06-28 RX ADMIN — MEROPENEM 1 G: 1 INJECTION, POWDER, FOR SOLUTION INTRAVENOUS at 17:20

## 2020-06-28 RX ADMIN — MEROPENEM 1 G: 1 INJECTION, POWDER, FOR SOLUTION INTRAVENOUS at 09:55

## 2020-06-28 RX ADMIN — CIPROFLOXACIN 750 MG: 750 TABLET, FILM COATED ORAL at 09:55

## 2020-06-28 RX ADMIN — Medication 10 ML: at 20:48

## 2020-06-28 RX ADMIN — ENOXAPARIN SODIUM 100 MG: 100 INJECTION SUBCUTANEOUS at 09:07

## 2020-06-28 RX ADMIN — CIPROFLOXACIN 750 MG: 750 TABLET, FILM COATED ORAL at 20:47

## 2020-06-28 RX ADMIN — IPRATROPIUM BROMIDE AND ALBUTEROL SULFATE 1 AMPULE: .5; 3 SOLUTION RESPIRATORY (INHALATION) at 08:31

## 2020-06-28 RX ADMIN — IPRATROPIUM BROMIDE AND ALBUTEROL SULFATE 1 AMPULE: .5; 3 SOLUTION RESPIRATORY (INHALATION) at 12:39

## 2020-06-28 RX ADMIN — ENOXAPARIN SODIUM 100 MG: 100 INJECTION SUBCUTANEOUS at 20:47

## 2020-06-28 ASSESSMENT — ENCOUNTER SYMPTOMS
WHEEZING: 0
DIARRHEA: 0
CHEST TIGHTNESS: 1
NAUSEA: 0
SHORTNESS OF BREATH: 1
BLOOD IN STOOL: 0
COUGH: 1
ABDOMINAL PAIN: 0
CONSTIPATION: 0
VOMITING: 0

## 2020-06-28 ASSESSMENT — PAIN DESCRIPTION - ORIENTATION
ORIENTATION: RIGHT
ORIENTATION: RIGHT

## 2020-06-28 ASSESSMENT — PAIN SCALES - GENERAL
PAINLEVEL_OUTOF10: 1
PAINLEVEL_OUTOF10: 1

## 2020-06-28 ASSESSMENT — PAIN DESCRIPTION - LOCATION
LOCATION: TOE (COMMENT WHICH ONE);FOOT
LOCATION: TOE (COMMENT WHICH ONE)

## 2020-06-28 NOTE — PROGRESS NOTES
[facility] [address]    Progress Note    6/28/2020    10:35 AM    Name:   Kelly Carrillo  MRN:     [id number]     Acct:      [de-identified]   Room:   [address] [place] [address] Day:  1  Admit Date:  6/27/2020 11:19 AM    PCP:   Michael Zaman PA-C  Code Status:  Full Code    Subjective:     C/C:   Chief Complaint   Patient presents with    Shortness of Breath    Cough     Interval History Status: not changed. Pt was seen and evaluated at bedside this morning. He reports feeling about the same. He still complains of cough and bringing up blood with coughing. Brief History:     69year old male admitted with cough, hemoptysis, shortness of breath and chest pain. Review of Systems:     Constitutional:  negative for chills, fevers, sweats  Respiratory:  negative for cough, dyspnea on exertion, shortness of breath, wheezing  Cardiovascular:  negative for chest pain, chest pressure/discomfort, lower extremity edema, palpitations  Gastrointestinal:  negative for abdominal pain, constipation, diarrhea, nausea, vomiting  Neurological:  negative for dizziness, headache    Medications: Allergies:     Allergies   Allergen Reactions    Darvocet [Propoxyphene N-Acetaminophen] Hives    Other Hives    Oxycodone-Acetaminophen        Current Meds:   Scheduled Meds:    [Held by provider] rivaroxaban  20 mg Oral Daily    enoxaparin  1 mg/kg Subcutaneous BID    meropenem  1 g Intravenous Q8H    ciprofloxacin  750 mg Oral 2 times per day    sodium chloride flush  10 mL Intravenous 2 times per day    ipratropium-albuterol  1 ampule Inhalation Q4H WA     Continuous Infusions:    sodium chloride 75 mL/hr at 06/27/20 1829     PRN Meds: sodium chloride flush, magnesium hydroxide, promethazine **OR** ondansetron, nicotine, albuterol    Data:     Past Medical History:   has a past medical history of Acute superficial gastritis without hemorrhage, Anastomotic stricture of stomach, Asthma, Atrial fibrillation ([facility] Calculus of gallbladder without cholecystitis without obstruction, Chronic diastolic heart failure (HCC), Chronic rhinosinusitis, Class 2 severe obesity due to excess calories with serious comorbidity and body mass index (BMI) of 36.0 to 36.9 in adult ([facility] E. [facility] septic[facility] E. coli UTI, Foot drop, right, Fracture of femur ([facility] Gait disorder, Gastric out let obstruction, GERD (gastroesophageal reflux disease), Hallux valgus, acquired, bilateral, Hyperlipidemia, Hypertension, Impaired hearing, Internal hemorrhoids, Lymphedema of both lower extremities, Nausea & vomiting, OA (osteoarthritis) of knee, bilateral, Obesity, MARIAMA on CPAP, Osteoarthritis, Osteoarthritis of lumbar spine, S/P revision of total knee, Septicemia due to E. coli (HCC), Simple chronic bronchitis ([facility] Slow transit constipation, Unspecified sleep apnea, and UTI (urinary tract infection). Social History:   reports that he quit smoking about 43 years ago. He has a 20.00 pack-year smoking history. He has never used smokeless tobacco. He reports that he does not drink alcohol or use drugs. Family History:   Family History   Problem Relation Age of Onset   Radha Armenian Cancer Mother     Heart Attack Father        Vitals:  BP (!) 96/57   Pulse 60   Temp 99.6 °F (37.6 °C) (Oral)   Resp 18   Ht 5' 10\" (1.778 m)   Wt 220 lb (99.8 kg)   SpO2 96%   BMI 31.57 kg/m²   Temp (24hrs), Av.5 °F (36.9 °C), Min:97.4 °F (36.3 °C), Max:99.6 °F (37.6 °C)    No results for input(s): POCGLU in the last 72 hours. I/O (24Hr):     Intake/Output Summary (Last 24 hours) at 2020 1035  Last data filed at 2020 0450  Gross per 24 hour   Intake 1075 ml   Output 850 ml   Net 225 ml       Labs:  Hematology:  Recent Labs     20  1150 20  2306 20  0601   WBC 10.4 8.8 7.2   RBC 3.57* 3.21* 3.19*   HGB 9.6* 8.6* 8.5*   HCT 32.0* 29.3* 28.9*   MCV 89.6 91.3 90.6   MCH 26.9 26.8 26.6 MCHC 30.0 29.4 29.4   RDW 23.1* 23.8* 23.9*    212 199   MPV 8.9 8.7 8.6   INR 1.2  --   --      Chemistry:  Recent Labs     06/27/20  1150 06/27/20  1346 06/27/20  2306 06/28/20  0601     --  133* 134*   K 3.6*  --  3.7 3.7   CL 95*  --  95* 96*   CO2 29 -- 25 27   GLUCOSE 98  --  110* 87   BUN 15  --  14 14   CREATININE 0.67*  --  0.69* 0.74   MG  --   --  2.0  --    ANIONGAP 11  --  13 11   LABGLOM >60  --  >60 >60   GFRAA >60  --  >60 >60   CALCIUM 8.5*  --  7.9* 8.0*   PROBNP 4,433*  --   --   --    TROPHS 54* 49*  --   --      Recent Labs     06/27/20  1150   PROT 6.8   LABALBU 3.4*   AST 32   ALT 33   ALKPHOS 150*   BILITOT 1.09     ABG:No results found for: POCPH, PHART, PH, POCPCO2, ZXN3HDG, PCO2, POCPO2, PO2ART, PO2, POCHCO3, MYB8VUT, HCO3, NBEA, PBEA, BEART, BE, THGBART, THB, JLC0IPB, QWNI9MWK, Y3QQEPBF, O2SAT, FIO2  Lab Results   Component Value Date/Time    SPECIAL NOT REPORTED 10/21/2019 01:25 PM     Lab Results   Component Value Date/Time    CULTURE NO GROWTH 10/21/2019 01:25 PM       Radiology:  Xr Chest Standard (2 Vw)    Result Date: 6/27/2020  Subtle right basilar infiltrate representing atelectasis versus pneumonia. Ct Chest Pulmonary Embolism W Contrast    Result Date: 6/27/2020  No acute or chronic pulmonary embolism. Nodular areas of consolidation in the right lower lobe consistent with pneumonia. Trace right-sided effusion.        Physical Examination:        General appearance:  alert, cooperative and no distress  Mental Status:  oriented to person, place and time and normal affect  Lungs:  clear to auscultation bilaterally, normal effort  Heart:  regular rate and rhythm, no murmur  Abdomen:  soft, nontender, nondistended, normal bowel sounds, no masses, hepatomegaly, splenomegaly  Extremities:  no edema, redness, tenderness in the calves  Skin:  no gross lesions, rashes, induration    Assessment:        Hospital Problems           Last Modified POA    * (Principal)

## 2020-06-28 NOTE — CARE COORDINATION
Medications at Time of Discharge from [facility] 6/26/20202  - documented as of this encounter  Medication Sig Dispensed Refills Start Date End Date   tiZANidine (ZANAFLEX) 4 mg tablet    Indications: muscle spasm Take 4 mg by mouth daily as needed for muscle spasms Indications: muscle spasm.   0       ACETAMINOPHEN (TYLENOL ARTHRITIS ORAL)   Take 650 mg by mouth as needed.   0       albuterol (PROVENTIL) 2 mg tablet   Take 2 mg by mouth 3 (three) times a day.    0 10/09/2017     bisacodyl (DULCOLAX) 10 mg suppository   10 mg daily.    0       bumetanide (BUMEX) 1 mg tablet   Take 1 mg by mouth 2 (two) times a day.    0 10/11/2017     cetirizine (ZyrTEC) 10 mg tablet   Take 10 mg by mouth once daily.   2 09/07/2017     DAILY-FELIZ tablet   Take 1 tablet by mouth once daily.   3 09/15/2017     docusate sodium (COLACE) 100 mg capsule   Take 100 mg by mouth as needed for constipation.   0       fluticasone (FLONASE) 50 mcg/actuation nasal spray   Administer 2 sprays into each nostril 2 (two) times a day.    0 10/09/2017     gabapentin (NEURONTIN) 300 mg capsule   Take 300 mg by mouth 3 (three) times a day.   0       ipratropium-albuterol (DUO-NEB) 0.5 mg-3 mg(2.5 mg base)/3 mL nebulizer   Inhale 3 mL 4 (four) times a day.    0       KLOR-CON M20 20 mEq CR tablet   Take 60 mEq by mouth 2 (two) times a day.    0 09/18/2017     loratadine (CLARITIN) 10 mg tablet   Take 10 mg by mouth daily.   0       menthol-zinc oxide (RISAMINE) 0.44-20.6 % ointment   Apply 1 application topically.   0       methyl salicylate-menthol (ICY HOT) 30-10 % cream   Apply topically 2 (two) times a day as needed (muscle pain).   0 56/91/0238     METHYL SALICYLATE/MENTHOL (BENGAY ARTHRITIS FORMULA TOP)   Apply topically.   0       metoprolol succinate XL (TOPROL-XL) 25 mg 24 hr tablet   Take 1 tablet (25 mg total) by mouth 2 (two) times daily at 0800 and 1200. 60 tablet   0 06/26/2020     modafinil (PROVIGIL) 200 mg tablet   Take 200 mg by

## 2020-06-28 NOTE — CONSULTS
excess calories with serious comorbidity and body mass index (BMI) of 36.0 to 36.9 in adult [facility]) 11/17/2017    E. coli septicemia ([facility]     E. coli UTI     Foot drop, right 7/10/2012    Fracture of femur ([facility] 10/23/2016    Gait disorder 7/20/2016    Gastric out let obstruction     GERD (gastroesophageal reflux disease)     Hallux valgus, acquired, bilateral 6/24/2015    Hyperlipidemia     Hypertension     Impaired hearing 4/13/2015    Internal hemorrhoids 1/3/2017    Lymphedema of both lower extremities 6/24/2015    Nausea & vomiting 11/16/2018    OA (osteoarthritis) of knee, bilateral 12/11/2006    Obesity     MARIAMA on CPAP 5/2/2017    Osteoarthritis     Osteoarthritis of lumbar spine 12/13/2007     replace inactive diagnosis    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 coli (HCC)     Due to UTI     Simple chronic bronchitis ([facility] 4/10/2018    Slow transit constipation 11/3/2016    Unspecified sleep apnea     UTI (urinary tract infection)        Past Surgical  History:     Past Surgical History:   Procedure Laterality Date    CARPAL TUNNEL RELEASE      bilateral    COLONOSCOPY      CYSTOSCOPY  01/02/2019    by Dr. Safia Randhawa N/A 1/2/2019    CYSTOSCOPY performed by Herve Aviles MD at [address]    first knuckle right index finger   [facility]    vertical banded gastroplasty   [address] REPLACEMENT  2004 & 2005    bilateral knees    AR EGD [address] N/A 8/16/2018    EGD FOREIGN BODY REMOVAL performed by Julio Acuña MD at [address] EGD TRANSORAL BIOPSY SINGLE/MULTIPLE N/A 5/25/2018    EGD BIOPSY performed by Naldo Villegas DO at [address] [facility][1]      left shoulder    UPPER GASTROINTESTINAL ENDOSCOPY  08/13/2018    removal of food bolus    UPPER GASTROINTESTINAL ENDOSCOPY N/A 8/13/2018    EGD FOREIGN BODY REMOVAL performed by Nick Wilson DO at [facility] [address] ENDOSCOPY N/A 2018    EGD BIOPSY performed by Nick Wilson DO at [address]  2018    egd with balloon dilitation    UPPER GASTROINTESTINAL ENDOSCOPY  2018    EGD DILATION BALLOON performed by Perla Davis MD at [address] N/A 10/1/2018    EGD BIOPSY performed by Sameer العلي MD at [address] [facility] ENDOSCOPY  10/1/2018    EGD DILATION BALLOON performed by Sameer العلي MD at [address] [place] [address] N/A 2018    EGD DILATION BALLOON performed by Sameer العلي MD at [facility]       Medications:      [Held by provider] rivaroxaban  20 mg Oral Daily    enoxaparin  1 mg/kg Subcutaneous BID    sodium chloride flush  10 mL Intravenous 2 times per day    ipratropium-albuterol  1 ampule Inhalation Q4H WA    piperacillin-tazobactam  3.375 g Intravenous Q8H    vancomycin (VANCOCIN) intermittent dosing (placeholder)   Other RX Placeholder    vancomycin  1,250 mg Intravenous Q12H       Social History:     Social History     Socioeconomic History    Marital status:      Spouse name: Not on file    Number of children: Not on file    Years of education: Not on file    Highest education level: Not on file   Occupational History    Not on file   Social Needs    Financial resource strain: Not on file    Food insecurity     Worry: Not on file     Inability: Not on file    Transportation needs     Medical: Not on file     Non-medical: Not on file   Tobacco Use    Smoking status: Former Smoker     Packs/day: 1.00     Years: 20.00     Pack years: 20.00     Last attempt to quit: 1976     Years since quittin.5    Smokeless tobacco: Never Used   Substance and Sexual Activity    Alcohol use: No    Drug use: No    Sexual activity: Not on file   Lifestyle    Physical activity     Days per week: Not on file     Minutes per session: Not on file    Stress: Not on file   Relationships    Social connections     Talks on phone: Not on file     Gets together: Not on file     Attends Confucianist service: Not on file     Active member of club or organization: Not on file     Attends meetings of clubs or organizations: Not on file     Relationship status: Not on file    Intimate partner violence     Fear of current or ex partner: Not on file     Emotionally abused: Not on file     Physically abused: Not on file     Forced sexual activity: Not on file   Other Topics Concern    Not on file   Social History Narrative    Not on file       Family History:     Family History   Problem Relation Age of Onset    Cancer Mother     Heart Attack Father         Allergies:   Darvocet [propoxyphene n-acetaminophen]; Other; and Oxycodone-acetaminophen     Review of Systems:   Constitutional: No fevers or chills. No systemic complaints  Head: No headaches  Eyes: No double vision or blurry vision. No conjunctival inflammation. ENT: No sore throat or runny nose. . No hearing loss, tinnitus or vertigo. Cardiovascular: No chest pain or palpitations. Shortness of breath. JONES  Lung: Shortness of breath, cough. Hemoptysis  Abdomen: No nausea, vomiting, diarrhea, or abdominal pain. Flako Link No cramps. Genitourinary: No increased urinary frequency, or dysuria. No hematuria. No suprapubic or CVA pain  Musculoskeletal: No muscle aches or pains. No joint effusions, swelling or deformities. Bilateral leg edema  Hematologic: No bleeding or bruising. Neurologic: No headache, weakness, numbness, or tingling. Integument: No rash, no ulcers. Psychiatric: No depression. Endocrine: No polyuria, no polydipsia, no polyphagia.     Physical Examination :     Patient Vitals for the past 8 hrs:   BP Temp Temp src Pulse Resp   06/28/20 0450 (!) 104/56 97.9 °F (36.6 °C) Oral 75 18   06/28/20 0336 -- -- -- -- 19     General Appearance: Awake, alert, and in no apparent distress  Head:  Normocephalic, no trauma  Eyes: Pupils equal, round, reactive to light and accommodation; extraocular movements intact; sclera anicteric; conjunctivae pink. No embolic phenomena. ENT: Oropharynx clear, without erythema, exudate, or thrush. No tenderness of sinuses. Mouth/throat: mucosa pink and moist. No lesions. Dentition in good repair. Neck:Supple, without lymphadenopathy. Thyroid normal, No bruits. Pulmonary/Chest: Clear to auscultation, without wheezes, rales, or rhonchi. No dullness to percussion. Cardiovascular: Regular rate and rhythm without murmurs, rubs, or gallops. Abdomen: Soft, non tender. Bowel sounds normal. No organomegaly  All four Extremities: No cyanosis, clubbing, edema, or effusions. Neurologic: No gross sensory or motor deficits. Skin: Warm and dry with good turgor. No signs of peripheral arterial or venous insufficiency. No ulcerations. No open wounds. Medical Decision Making -Laboratory:   I have independently reviewed/ordered the following labs:    CBC with Differential:   Recent Labs     06/27/20  1150 06/27/20  2306 06/28/20  0601   WBC 10.4 8.8 7.2   HGB 9.6* 8.6* 8.5*   HCT 32.0* 29.3* 28.9*    212 199   LYMPHOPCT 10*  --   --    MONOPCT 15*  --   --      BMP:   Recent Labs     06/27/20  2306 06/28/20  0601   * 134*   K 3.7 3.7   CL 95* 96*   CO2 25 27   BUN 14 14   CREATININE 0.69* 0.74   MG 2.0  --      Hepatic Function Panel:   Recent Labs     06/27/20  1150   PROT 6.8   LABALBU 3.4*   BILITOT 1.09   ALKPHOS 150*   ALT 33   AST 32     No results for input(s): RPR in the last 72 hours. No results for input(s): HIV in the last 72 hours. No results for input(s): BC in the last 72 hours.   Lab Results   Component Value Date    MUCUS NOT REPORTED 06/27/2020    RBC 3.19 06/28/2020    RBC 4.39 09/13/2011    TRICHOMONAS NOT REPORTED 06/27/2020    WBC 7.2 06/28/2020    YEAST NOT REPORTED 06/27/2020    TURBIDITY CLEAR 06/27/2020     Lab Results   Component Value Date    CREATININE 0.74 06/28/2020    GLUCOSE 87 06/28/2020    GLUCOSE 99 09/13/2011       Medical Decision Making-Imaging:     EXAMINATION:   CTA OF THE CHEST 6/27/2020 2:08 pm       TECHNIQUE:   CTA of the chest was performed after the administration of intravenous   contrast.  Multiplanar reformatted images are provided for review.  MIP   images are provided for review. Dose modulation, iterative reconstruction,   and/or weight based adjustment of the mA/kV was utilized to reduce the   radiation dose to as low as reasonably achievable.       COMPARISON:   None.       HISTORY:   ORDERING SYSTEM PROVIDED HISTORY: R/o PE - hemoptysis x4d   TECHNOLOGIST PROVIDED HISTORY:   R/o PE - hemoptysis x4d       FINDINGS:   Pulmonary Arteries: Pulmonary arteries are adequately opacified for   evaluation.  No evidence of intraluminal filling defect to suggest pulmonary   embolism.  Main pulmonary artery is normal in caliber.       Mediastinum: No evidence of mediastinal lymphadenopathy.  The heart and   pericardium demonstrate no acute abnormality.  There is no acute abnormality   of the thoracic aorta.       Lungs/pleura: There are nodular areas of consolidation the right lower lobe. There is trace right-sided effusion. Elesa Pound is no pneumothorax.  The   tracheobronchial tree is patent.       Upper Abdomen:  There is cholelithiasis.       Soft Tissues/Bones: No acute bone or soft tissue abnormality. Elesa Pound is a   right thyroid lobe nodule measuring 2.4 x 1.6 cm.           Impression   No acute or chronic pulmonary embolism.       Nodular areas of consolidation in the right lower lobe consistent with   pneumonia.  Trace right-sided effusion.           Medical Decision Cgmgrx-Mndaahep-Cvkae:     Component Name Value Ref Range   Gram Stain Result 0 to 1 WHITE BLOOD CELLS/LPF     Gram Stain Result 0 to 1 SQUAMOUS EPITHELIAL CELLS/LPF     Gram Stain Result 0 CILIATED EPITHELIAL CELLS/LPF     Gram Stain Result MANY GRAM POSITIVE COCCI     Gram Stain Result MANY YEAST     Culture MODERATE ENTEROBACTER CLOACAE COMPLEX (A)     Culture MODERATE LYUBOV ALBICANS (A)     Specimen Collected on   Sputum - Sputum 6/17/2020 3:15 PM     Organism Antibiotic Method Susceptibility   Enterobacter cloacae complex Amikacin KAITY METHOD <=2: Susceptible    Cefazolin KAITY METHOD >=64: Resistant    Cefepime KAITY METHOD 2: Susceptible    Cefotaxime KAITY METHOD >=64: Resistant    Ceftriaxone KAITY METHOD >=64: Resistant    Cefuroxime KAITY METHOD >=64: Resistant    Ciprofloxacin AKITY METHOD <=0.25: Susceptible    Gentamicin KAITY METHOD <=1: Susceptible    IMIPENEM KAITY METHOD <=0.25: Susceptible    Levofloxacin KAITY METHOD <=0.12: Susceptible    Meropenem KAITY METHOD <=0.25: Susceptible    PIPERACIL/TAZOBACTAM KAITY METHOD >=128: Resistant    Tobramycin KAITY METHOD <=1: Susceptible    TRIMETH/SULFAMETHOXAZOLE KAITY METHOD <=1/19: Susceptible    Ertapenem KAITY METHOD 4: Resistant    MULTIDRUG RESISTANCE/MDRO KAITY METHOD POSITIVE    Comment:   NON CARABPENEM PRODUCING  CARBAPENEM RESISTANT  ENTEROBACTERIACAE  NO CARBAPENEMASE DETECTED       Medical Decision Making-Other:     Note:  · Labs, medications, radiologic studies were reviewed with personal review of films  · Large amounts of data were reviewed  · Discussed with nursing Staff, Discharge planner  · Infection Control and Prevention measures reviewed  · All prior entries were reviewed  · Administer medications as ordered  · Prognosis: Guarded  · Discharge planning reviewed  · Follow up as outpatient. Thank you for allowing us to participate in the care of this patient. Please call with questions.     Roz Lawson MD  Pager: [phone] - Office: [phone]

## 2020-06-28 NOTE — PROGRESS NOTES
WILLIAM Jiménezatient Assessment complete. Hospital-acquired bacterial pneumonia [J15.9]  Hospital-acquired bacterial pneumonia [J15.9] . Vitals:    06/27/20 2049   BP:    Pulse:    Resp: 18   Temp:    SpO2: 98%       Assessment   Patient alert and oriented. He currently on room air O2 saturation 96%,breathing sounds clear diminished,RR18. He has history of Asthma ,MARIAMA on CPAP. Patient states that he takes albuterol  Q4 at home . patient denies any SOB at his time ,NO complication NO signs of distress at this time .  Will continue to monitor   RR 18   Breath Sounds: clear diminished       · Bronchodilator assessment at level  Home med level 1  · Hyperinflation assessment at level   · Secretion Management assessment at level    ·   · [x]    Bronchodilator Assessment  BRONCHODILATOR ASSESSMENT SCORE  Score 0 1 2 3 4 5   Breath Sounds   []  Patient Baseline [x]  No Wheeze good aeration []  Faint, scattered wheezing, good aeration []  Expiratory Wheezing and or moderately diminished []  Insp/Exp wheeze and/or very diminished []  Insp/Exp and/ or marked distress   Respiratory Rate   []  Patient Baseline [x]  Less than 20 []  Less than 20 []  20-25 []  Greater than 25 []  Greater than 25   Peak flow % of Pred or PB [x]  NA   []  Greater than 90%  []  81-90% []  71-80% []  Less than or equal to 70%  or unable to perform []  Unable due to Respiratory Distress   Dyspnea re []  Patient Baseline [x]  No SOB []  No SOB []  SOB on exertion []  SOB min activity []  At rest/acute   e FEV% Predicted       [x]  NA []  Above 69%  []  Unable []  Above 60-69%  []  Unable []  Above 50-59%  []  Unable []  Above 35-49%  []  Unable []  Less than 35%  []  Unable                 []  Hyperinflation Assessment  Score 1 2 3   CXR and Breath Sounds   []  Clear []  No atelectasis  Basilar aeration []  Atelectasis or absent basilar breath sounds   Incentive Spirometry Volume  (Per IBW)   []  Greater than or equal to 15ml/Kg []  less than 15ml/Kg []  less than 15ml/Kg   Surgery within last 2 weeks []  None or general   []  Abdominal or thoracic surgery  []  Abdominal or thoracic   Chronic Pulmonary Historyre []  No []  Yes []  Yes     []  Secretion Management Assessment  Score 1 2 3   Bilateral Breath Sounds   []  Occasional Rhonchi []  Scattered Rhonchi []  Course Rhonchi and/or poor aeration   Sputum    []  Small amount of thin secretions []  Moderate amount of viscous secretions []  Copius, Viscious Yellow/ Secretions   CXR as reported by physician []  clear  []  Unavailable []  Infiltrates and/or consolidation  []  Unavailable []  Mucus Plugging and or lobar consolidation  []  Unavailable   Cough []  Strong, productive cough []  Weak productive cough []  No cough or weak non-productive cough   Lizzy Jaylen  12:55 AM                            FEMALE                                  MALE                            FEV1 Predicted Normal Values                        FEV1 Predicted Normal Values          Age                                     Height in Feet and Inches       Age                                     Height in Feet and Inches       4' 11\" 5' 1\" 5' 3\" 5' 5\" 5' 7\" 5' 9\" 5' 11\" 6' 1\"  4' 11\" 5' 1\" 5' 3\" 5' 5\" 5' 7\" 5' 9\" 5' 11\" 6' 1\"   42 - 45 2.49 2.66 2.84 3.03 3.22 3.42 3.62 3.83 42 - 45 2.82 3.03 3.26 3.49 3.72 3.96 4.22 4.47   46 - 49 2.40 2.57 2.76 2.94 3.14 3.33 3.54 3.75 46 - 49 2.70 2.92 3.14 3.37 3.61 3.85 4.10 4.36   50 - 53 2.31 2.48 2.66 2.85 3.04 3.24 3.45 3.66 50 - 53 2.58 2.80 3.02 3.25 3.49 3.73 3.98 4.24   54 - 57 2.21 2.38 2.57 2.75 2.95 3.14 3.35 3.56 54 - 57 2.46 2.67 2.89 3.12 3.36 3.60 3.85 4.11   58 - 61 2.10 2.28 2.46 2.65 2.84 3.04 3.24 3.45 58 - 61 2.32 2.54 2.76 2.99 3.23 3.47 3.72 3.98   62 - 65 1.99 2.17 2.35 2.54 2.73 2.93 3.13 3.34 62 - 65 2.19 2.40 2.62 2.85 3.09 3.33 3.58 3.84   66 - 69 1.88 2.05 2.23 2.42 2.61 2.81 3.02 3.23 66 - 69 2.04 2.26 2.48 2.71 2.95 3.19 3.44 3.70   70+ 1.82 1.99 2.17 2.36

## 2020-06-28 NOTE — H&P
[address] [place] [address]    HISTORY AND PHYSICAL EXAMINATION            Date:   6/27/2020  Patient name:  Todd Medina  Date of admission:  6/27/2020 11:19 AM  MRN:   [id number]  Account:  [de-identified]  YOB: 1945  PCP:    Hermila Polanco PA-C  Room:   8499/2231-22  Code Status:    Full Code    Chief Complaint:     Chief Complaint   Patient presents with    Shortness of Breath    Cough   hemoptysis    History Obtained From:     patient, electronic medical record    History of Present Illness:     Todd Medina is a 69 y.o. Non-/non  male MARIAMA/COPD, paroxysmal AFib with sick sinus syndrome, diastolic heart failure who presents with Shortness of Breath and Cough   and is admitted to the hospital for the management of hospital-acquired pneumonia. Patient presents to the hospital with a complaint of cough, hemoptysis,  shortness of breath and chest pain. Patient states that he was just recently admitted and discharged from [place] [address] in which he has been discussing with them that he has been having no hemoptysis and nothing was done. While in the emergency room he coughed up blood clot per ERs documentation was approximately a teaspoon's worth.   He states that he has been using his home nebulizers with mild relief but denies any fevers, chills, nausea, vomiting, abdominal pain, or body aches, melena, or  hematochezia  His work-up in the emergency room revealed a metabolic panel with a sodium of 135 potassium 3.6 chloride 95 CO2 29 creatinine 0.67 BUN of 15, his proBNP of 4433 and a high sensitive troponin of 49, her function essentially normal with a mild elevation in the alk phos at 150, his hemogram was without acute leukocytosis with a WBC of 10.4 with a mild anemia with a hemoglobin of 9.6 and a hematocrit of 32.0 RBCs 3.57 RDW 23.1, his INR was 1.2 with a PT of 12.3, PTT of 32.4, urinalysis unremarkable with a trace leukoesterase but no white blood cell count, negative for nitrate  The initial chest x-ray showed Subtle right basilar infiltrate representing atelectasis versus pneumonia. CTA of the chest showed No acute or chronic pulmonary embolism, Nodular areas of consolidation in the right lower lobe consistent with pneumonia, and a trace fusion. Patient was just recently hospitalizedfor acute on chronic diastolic heart failure with symptomatic shortness of breath, acute severe bradycardia with concern for metoprolol toxicity. Patient admitted to the ICU status post dopamine drip, glucagon for concern for metoprolol toxicity. He was treated for fluid overload with diuresis to a gradual taper of his home dose of Bumex of 1 mg twice daily. And stopped the hydrochlorothiazide and Aldactone, he was slowly titrated to Lopressor 25 mg twice daily from his Toprol under milligrams daily. He was followed by the pulmonary service and had several chest x-rays for cough which showed mild right pulmonary atelectasis without pneumonia and he was provided with pulmonary hygiene, iv steroids,  and bronchodilators and given decongestants and antihistamines. With the chronic cough there was concern of aspiration and he was placed on a thin liquid but dysphasia diet. P.T.  recommend skilled facility with weak and deconditioned state. Patient refuses       Hemoglobin 8.6  Hematocrit 26.7  COVID negative (6/14/2020)  MRSA PCR was positive. A final result for a sputum culture showed enterobacter cloacae and candida albicans.  Susceptibility showed multi-drug resistance from the enterobacter  Blood cultures show no growth    Past Medical History:     Past Medical History:   Diagnosis Date    Acute superficial gastritis without hemorrhage 5/26/2018    Anastomotic stricture of stomach     Asthma     Atrial fibrillation ([address]     On Xarelto 6/27/2020    Calculus of gallbladder without cholecystitis without obstruction 5/2/2017    Chronic diastolic heart failure ([address] 11/17/2017    Chronic rhinosinusitis 4/13/2015    Class 2 severe obesity due to excess calories with serious comorbidity and body mass index (BMI) of 36.0 to 36.9 in adult ([address] 11/17/2017    E. coli septicemia ([address]     E. 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 coli ([address]     Due to UTI     Simple chronic bronchitis ([address] 4/10/2018    Slow transit constipation 11/3/2016    Unspecified sleep apnea     UTI (urinary tract infection)         Past Surgical History:     Past Surgical History:   Procedure Laterality Date    CARPAL TUNNEL RELEASE      bilateral    COLONOSCOPY      CYSTOSCOPY  01/02/2019    by Dr. Jatinder Marmolejo N/A 1/2/2019    CYSTOSCOPY performed by Pedro Michel MD at [address]    first knuckle right index finger   [facility]    vertical banded gastroplasty   [address] REPLACEMENT  2004 & 2005    bilateral knees    [facility] [address] N/A 8/16/2018    EGD FOREIGN BODY REMOVAL performed by Monica Jones MD at [address] EGD TRANSORAL BIOPSY SINGLE/MULTIPLE N/A 5/25/2018    EGD BIOPSY performed by Jane Castañeda DO at [address] [facility]      left [facility] GASTROINTESTINAL ENDOSCOPY  08/13/2018    removal of food bolus    UPPER GASTROINTESTINAL ENDOSCOPY N/A 8/13/2018    EGD FOREIGN BODY REMOVAL performed by Naldo Villegas DO at [address] [place] [address] 8/13/2018    EGD BIOPSY performed by Naldo Villegas DO at [place] [address]  08/16/2018    egd with balloon dilitation    UPPER GASTROINTESTINAL ENDOSCOPY  8/16/2018    EGD DILATION BALLOON performed by Julio Acuña MD at [address] [facility] 10/1/2018    EGD BIOPSY performed by Kane Ramires MD at [address] [place] [address]  10/1/2018    EGD DILATION BALLOON performed by Kane Ramires MD at [address] [place] [address] N/A 11/19/2018    EGD DILATION BALLOON performed by Kane Ramires MD at [facility]        Medications Prior to Admission:     Prior to Admission medications    Medication Sig Start Date End Date Taking?  Authorizing Provider   spironolactone (ALDACTONE) 25 MG tablet TAKE 1 TABLET BY MOUTH DAILY 5/27/20  Yes Michael Quale, PA-C   gabapentin (NEURONTIN) 300 MG capsule TAKE 1 CAPSULE BY MOUTH THREE TIMES DAILY 6/25/20 7/25/20 Yes Michael Quale, PA-C   cetirizine (ZYRTEC) 10 MG tablet TAKE 1 TABLET BY MOUTH DAILY 5/27/20  Yes Michael Quale, PA-C   rOPINIRole (REQUIP) 0.25 MG tablet TAKE 1 TABLET BY MOUTH TWICE DAILY 5/27/20  Yes Michael Quale, PA-C   rivaroxaban (XARELTO) 20 MG TABS tablet TAKE 1 TABLET BY MOUTH DAILY 5/27/20  Yes Michael Quale, PA-C   metoprolol succinate (TOPROL XL) 25 MG extended release tablet TAKE 1 TABLET BY MOUTH DAILY 5/27/20  Yes Michael Quale, PA-C   bumetanide (BUMEX) 1 MG tablet Take 2 tablets on MWF, 1 tablet on all other days 3/5/20  Yes Michael Quale, PA-C   potassium chloride (KLOR-CON M) 20 MEQ extended release tablet Take 1 tablet by mouth 2 times daily 3/5/20  Yes Michael Quale, PA-C fluticasone (FLONASE) 50 MCG/ACT nasal spray 2 sprays by Nasal route daily 3/5/20  Yes Traci Gay PA-C   guaiFENesin (SM MUCUS RELIEF) 600 MG extended release tablet Take 1 tablet by mouth 2 times daily 3/5/20  Yes Traci Gay PA-C   Multiple Vitamin (MULTI-VITAMINS) TABS TAKE 1 TABLET BY MOUTH DAILY 2/6/20  Yes Traci Gay PA-C   vitamin B-12 (CYANOCOBALAMIN) 100 MCG tablet TAKE 1 TABLET BY MOUTH DAILY AS NEEDED FOR FATIGUE 1/21/20  Yes Traci Gay PA-C   SM LUBRICANT EYE DROPS 0.4-0.3 % ophthalmic solution PLACE 1 DROP INTO BOTH EYES FOUR TIMES DAILY AS NEEDED FOR DRY EYES 10/17/19  Yes Traci Gay PA-C   Fluticasone furoate-vilanterol (BREO ELLIPTA) 200-25 MCG/INH AEPB inhaler Inhale 1 puff into the lungs daily   Yes Historical Provider, MD   Handicap Placard MISC by Does not apply route 6/27/19  Yes Traci Gay PA-C   Incontinence Supply Disposable (INCONTINENCE BRIEF LARGE) MISC To use as directed. Use 3x a day 4/30/19  Yes Traci Gay PA-C   polyethylene glycol [facility]) powder Take 17 g by mouth daily 3/5/19  Yes Traci Gay PA-C   [address] [facility] ([employer]OT[employer] MENS) [address] [facility] Provide insurance covered orthotics shoes, wear daily 12/12/18  Yes Traci Gay PA-C   albuterol (PROVENTIL) 2 MG tablet Use as needed 10/4/18  Yes Oliverio Flores MD   ipratropium-albuterol (DUONEB) 0.5-2.5 (3) MG/3ML SOLN nebulizer solution Inhale 1 vial into the lungs every 4 hours as needed    Yes Historical Provider, MD   Armodafinil 200 MG TABS Take 1 tablet by mouth 2 times daily. 4/2/18  Yes Historical Provider, MD   pantoprazole (PROTONIX) 40 MG tablet TAKE 1 TABLET BY MOUTH DAILY 5/27/20   Traci Gay PA-C   D-Mannose 500 MG CAPS Take 500 mg by mouth 3 times daily 11/11/19 12/11/19  Jens Benavidez MD        Allergies:     Darvocet [propoxyphene n-acetaminophen];  Other; and Oxycodone-acetaminophen    Social History:     Tobacco:    reports that he quit smoking about 43 years ago. He has a 20.00 pack-year smoking history. He has never used smokeless tobacco.  Alcohol:      reports no history of alcohol use. Drug Use:  reports no history of drug use. Family History:     Family History   Problem Relation Age of Onset    Cancer Mother     Heart Attack Father        Review of Systems:     Positive and Negative as described in HPI. Review of Systems   Constitutional: Positive for activity change, appetite change, chills and fever (Subjective). Negative for diaphoresis. HENT: Negative for congestion. Eyes: Negative for visual disturbance. Respiratory: Positive for cough, chest tightness and shortness of breath. Negative for wheezing. Cardiovascular: Positive for leg swelling. Negative for chest pain and palpitations. Gastrointestinal: Negative for abdominal pain, blood in stool, constipation, diarrhea, nausea and vomiting. Genitourinary: Negative for difficulty urinating. Skin: Positive for wound (Pressure ulcer). Neurological: Positive for weakness. Negative for dizziness, light-headedness, numbness and headaches. All other systems reviewed and are negative. Physical Exam:   BP (!) 100/55   Pulse 82   Temp 97.4 °F (36.3 °C) (Oral)   Resp 18   Ht 5' 10\" (1.778 m)   Wt 220 lb (99.8 kg)   SpO2 98%   BMI 31.57 kg/m²   Temp (24hrs), Av.4 °F (36.9 °C), Min:97.4 °F (36.3 °C), Max:99.2 °F (37.3 °C)    No results for input(s): POCGLU in the last 72 hours. No intake or output data in the 24 hours ending 20 1531    Physical Exam  Vitals signs and nursing note reviewed. Constitutional:       General: He is not in acute distress. Appearance: He is well-developed. He is not diaphoretic. HENT:      Head: Normocephalic and atraumatic. Right Ear: Hearing normal.      Left Ear: Hearing normal.      Nose: Nose normal. No rhinorrhea.    Eyes:      General: Lids are normal.      Extraocular Movements:      Right eye: Normal extraocular motion. Left eye: Normal extraocular motion. Conjunctiva/sclera: Conjunctivae normal.      Right eye: Right conjunctiva is not injected. Left eye: Left conjunctiva is not injected. Pupils: Pupils are equal, round, and reactive to light. Pupils are equal.      Right eye: Pupil is reactive. Left eye: Pupil is reactive. Neck:      Musculoskeletal: Neck supple. Thyroid: No thyromegaly. Vascular: No carotid bruit. Trachea: Trachea and phonation normal. No tracheal deviation. Cardiovascular:      Rate and Rhythm: Normal rate. Rhythm irregular. Pulses: Normal pulses. Heart sounds: Murmur present. Pulmonary:      Effort: Pulmonary effort is normal. No tachypnea, bradypnea, accessory muscle usage or respiratory distress. Breath sounds: No stridor. Examination of the right-upper field reveals rhonchi. Examination of the left-upper field reveals rhonchi. Examination of the right-middle field reveals wheezing. Examination of the left-middle field reveals wheezing. Examination of the right-lower field reveals decreased breath sounds. Examination of the left-lower field reveals decreased breath sounds. Decreased breath sounds, wheezing and rhonchi present. Comments: On room air in no acute distress  Abdominal:      General: Bowel sounds are normal. There is no distension. Palpations: Abdomen is soft. There is no mass. Tenderness: There is no abdominal tenderness. There is no guarding. Musculoskeletal:         General: No tenderness. Right lower le+ Edema present. Left lower le+ Edema present. Skin:     General: Skin is warm and dry. Findings: No erythema, lesion or rash. Neurological:      General: No focal deficit present. Mental Status: He is alert and oriented to person, place, and time. He is not disoriented. GCS: GCS eye subscore is 4. GCS verbal subscore is 5. GCS motor subscore is 6. Cranial Nerves: No cranial nerve deficit. Sensory: No sensory deficit. Motor: Motor function is intact. Psychiatric:         Attention and Perception: Attention normal.         Mood and Affect: Mood normal.         Speech: Speech normal.         Behavior: Behavior normal. Behavior is cooperative.          Cognition and Memory: Cognition normal.         Investigations:      Laboratory Testing:  Recent Results (from the past 24 hour(s))   CBC Auto Differential    Collection Time: 06/27/20 11:50 AM   Result Value Ref Range    WBC 10.4 3.5 - 11.3 k/uL    RBC 3.57 (L) 4.21 - 5.77 m/uL    Hemoglobin 9.6 (L) 13.0 - 17.0 g/dL    Hematocrit 32.0 (L) 40.7 - 50.3 %    MCV 89.6 82.6 - 102.9 fL    MCH 26.9 25.2 - 33.5 pg    MCHC 30.0 28.4 - 34.8 g/dL    RDW 23.1 (H) 11.8 - 14.4 %    Platelets 581 738 - 696 k/uL    MPV 8.9 8.1 - 13.5 fL    NRBC Automated 0.0 0.0 per 100 WBC    Differential Type NOT REPORTED     WBC Morphology NOT REPORTED     RBC Morphology NOT REPORTED     Platelet Estimate NOT REPORTED     Immature Granulocytes 1 (H) 0 %    Seg Neutrophils 71 (H) 36 - 65 %    Lymphocytes 10 (L) 24 - 43 %    Monocytes 15 (H) 3 - 12 %    Eosinophils % 2 1 - 4 %    Basophils 1 0 - 2 %    Absolute Immature Granulocyte 0.10 0.00 - 0.30 k/uL    Segs Absolute 7.39 1.50 - 8.10 k/uL    Absolute Lymph # 1.04 (L) 1.10 - 3.70 k/uL    Absolute Mono # 1.56 (H) 0.10 - 1.20 k/uL    Absolute Eos # 0.21 0.00 - 0.44 k/uL    Basophils Absolute 0.10 0.00 - 0.20 k/uL    Morphology ANISOCYTOSIS PRESENT    Comprehensive Metabolic Panel w/ Reflex to MG    Collection Time: 06/27/20 11:50 AM   Result Value Ref Range    Glucose 98 70 - 99 mg/dL    BUN 15 8 - 23 mg/dL    CREATININE 0.67 (L) 0.70 - 1.20 mg/dL    Bun/Cre Ratio NOT REPORTED 9 - 20    Calcium 8.5 (L) 8.6 - 10.4 mg/dL    Sodium 135 135 - 144 mmol/L    Potassium 3.6 (L) 3.7 - 5.3 mmol/L    Chloride 95 (L) 98 - 107 mmol/L    CO2 29 20 - 31 mmol/L    Anion Gap 11 9 - 17 mmol/L Alkaline Phosphatase 150 (H) 40 - 129 U/L    ALT 33 5 - 41 U/L    AST 32 <40 U/L    Total Bilirubin 1.09 0.3 - 1.2 mg/dL    Total Protein 6.8 6.4 - 8.3 g/dL    Alb 3.4 (L) 3.5 - 5.2 g/dL    Albumin/Globulin Ratio 1.0 1.0 - 2.5    GFR Non-African American >60 >60 mL/min    GFR African American >60 >60 mL/min    GFR Comment          GFR Staging NOT REPORTED    Troponin    Collection Time: 06/27/20 11:50 AM   Result Value Ref Range    Troponin, High Sensitivity 54 (HH) 0 - 22 ng/L    Troponin T NOT REPORTED <0.03 ng/mL    Troponin Interp NOT REPORTED    Brain Natriuretic Peptide    Collection Time: 06/27/20 11:50 AM   Result Value Ref Range    Pro-BNP 4,433 (H) <300 pg/mL    BNP Interpretation Pro-BNP Reference Range:    Protime-INR    Collection Time: 06/27/20 11:50 AM   Result Value Ref Range    Protime 12.3 (H) 9.0 - 12.0 sec    INR 1.2    APTT    Collection Time: 06/27/20 11:50 AM   Result Value Ref Range    PTT 32.4 (H) 20.5 - 30.5 sec   Troponin    Collection Time: 06/27/20  1:46 PM   Result Value Ref Range    Troponin, High Sensitivity 49 (H) 0 - 22 ng/L    Troponin T NOT REPORTED <0.03 ng/mL    Troponin Interp NOT REPORTED    URINALYSIS    Collection Time: 06/27/20  1:47 PM   Result Value Ref Range    Color, UA YELLOW YELLOW    Turbidity UA CLEAR CLEAR    Glucose, Ur NEGATIVE NEGATIVE    Bilirubin Urine NEGATIVE NEGATIVE    Ketones, Urine NEGATIVE NEGATIVE    Specific Gravity, UA 1.006 1.005 - 1.030    Urine Hgb TRACE (A) NEGATIVE    pH, UA 8.5 (H) 5.0 - 8.0    Protein, UA NEGATIVE NEGATIVE    Urobilinogen, Urine Normal Normal    Nitrite, Urine NEGATIVE NEGATIVE    Leukocyte Esterase, Urine TRACE (A) NEGATIVE    Urinalysis Comments NOT REPORTED    Microscopic Urinalysis    Collection Time: 06/27/20  1:47 PM   Result Value Ref Range    -          WBC, UA None 0 - 5 /HPF    RBC, UA 2 TO 5 0 - 4 /HPF    Casts UA NOT REPORTED 0 - 8 /LPF    Crystals, UA NOT REPORTED None /HPF    Epithelial Cells UA None 0 - 5 /HPF Xarelto was not resumed until I noted he was on it given the hemoptysis would like something a little bit more easy to reverse and the need of a procedure. Consultations:   IP CONSULT TO HOSPITALIST  IP CONSULT TO PHARMACY     Patient is admitted as inpatient status because of co-morbidities listed above, severity of signs and symptoms as outlined, requirement for current medical therapies and most importantly because of direct risk to patient if care not provided in a hospital setting.     JULIAN Reece - [place]  6/27/2020  10:11 PM    Copy sent to Dr. Mk Amador PA-C

## 2020-06-28 NOTE — PROGRESS NOTES
Occupational Therapy   Occupational Therapy Initial Assessment  Date: 2020   Patient Name: Isreal Stover  MRN: [id number]     : 1945    Date of Service: 2020    Discharge Recommendations:  Patient would benefit from continued therapy after discharge       Assessment   Performance deficits / Impairments: Decreased functional mobility ; Decreased balance;Decreased ADL status; Decreased endurance;Decreased high-level IADLs  Assessment: Pt required Min A for bed mobility, CGA for functional mobility and Max A for ADL task this date. Pt limited by increased fatigue and pain. Pt would benefit from continued OT services during acute hospitilization to increase independence in ADL/functional mobility tasks. Pt reports having good family support at home to assist PRN. Prognosis: Good  Decision Making: Medium Complexity  OT Education: OT Role;Plan of Care;Energy Conservation;Transfer Training  REQUIRES OT FOLLOW UP: Yes  Activity Tolerance  Activity Tolerance: Patient Tolerated treatment well;Patient limited by fatigue  Safety Devices  Safety Devices in place: Yes  Type of devices: Nurse notified;Gait belt;Call light within reach; Left in bed  Restraints  Initially in place: No           Patient Diagnosis(es): The encounter diagnosis was Pneumonia due to organism.      has a past medical history of Acute superficial gastritis without hemorrhage, Anastomotic stricture of stomach, Asthma, Atrial fibrillation ([facility] Calculus of gallbladder without cholecystitis without obstruction, Chronic diastolic heart failure (HCC), Chronic rhinosinusitis, Class 2 severe obesity due to excess calories with serious comorbidity and body mass index (BMI) of 36.0 to 36.9 in [facility]), E. coli septicemia ([facility] E. coli UTI, Foot drop, right, Fracture of femur ([facility] Gait disorder, Gastric out let obstruction, GERD (gastroesophageal reflux disease), Hallux valgus, acquired, bilateral, Hyperlipidemia, Hypertension, Impaired History  Social/Functional History  Lives With: Spouse(2 daughters and wife )  Type of Home: House  Home Layout: Two level, Able to Live on Main level with bedroom/bathroom, Laundry in basement  Home Access: Stairs to enter with rails  Entrance Stairs - Number of Steps: 5  Entrance Stairs - Rails: Right  Bathroom Shower/Tub: Tub/Shower unit  Bathroom Toilet: Handicap height  Bathroom Equipment: Tub transfer bench, Grab bars in shower, Grab bars around toilet  Bathroom Accessibility: [place]: 4 wheeled walker, [employer] Help From: Family(wife and daughters; however, reports one daughter is of poor health)  ADL Assistance: Independent(Pt states he does most bathing at sink/sponge bathing)  Homemaking Assistance: Independent  Homemaking Responsibilities: Yes  Ambulation Assistance: Independent  Transfer Assistance: Independent  Active : No  Patient's  Info: Daughter drives  Occupation: Retired  Leisure & Hobbies: Social interaction, [employer]  Additional Comments: Pt reports he does have family support at home to assist PRN       Objective   Vision: Impaired  Vision Exceptions: Wears glasses at all times  Hearing: Exceptions to [employer]  Hearing Exceptions: Bilateral hearing aid    Orientation  Overall Orientation Status: Within Normal Limits     Balance  Sitting Balance: Stand by assistance(ADL LB dressing, ~6 minutes)  Standing Balance: Contact guard assistance  Standing Balance  Time: ~5 minutes   Activity: functional mobility around room     Functional Mobility  Functional - Mobility Device: Standard Walker  Activity: (around hospital room)  Functional Mobility Comments: increased time/effort, pt with increased fatigue     ADL  Feeding: Independent  Grooming: Contact guard assistance; Increased time to complete  UE Bathing: Contact guard assistance; Increased time to complete  LE Bathing: Minimal assistance; Increased time to complete  UE Dressing: Minimal assistance; Increased time to Modifier : CK (06/28/20 3488)    Goals  Short term goals  Time Frame for Short term goals: By discharge:  Short term goal 1: Pt will complete functional transfers/mobility with Mod I, use of AD PRN  Short term goal 2: Pt will complete ADL tasks with Mod I, use of AE/AD PRN  Short term goal 3: Pt will demonstrate ~15 minutes of dynamic standing tolerance during functional task  Short term goal 4: Pt will demonstrate good safety awareness duing all functional mobility/ADL tasks        Therapy Time   Individual Concurrent Group Co-treatment   Time In 0223         Time Out 0243         Minutes 20         Timed Code Treatment Minutes: 8 Minutes       Samira Gleason OTR/L

## 2020-06-28 NOTE — FLOWSHEET NOTE
Assessment: Patient is a 69 y.o. male who was admitted to the hospital due to \"cough and shortness of breath. \" Patient was resting in hospital bed at time of 's initial rounding visit. Intervention:  visited patient per initial rounding visit.  introduced herself to patient who indicated that he was coping well tonight.  offered support and comfort to patient. Patient request Bible and devotional reading materials.  returned to room with materials for patient. Patient thanked  for support and care. Outcome: Patient was receptive of 's visit and support. Plan: Chaplains can make follow-up visit, per request. Danica Santo can be reached 24/7 via [employer]. Deepak Steward     06/27/20 3890   Encounter Summary   Services provided to: Patient   Referral/Consult From: [address] [facility] Family members; Gnosticist/tahir community   [facility] [employer]   Continue Visiting   (6/27/2020)   Complexity of Encounter Low   Length of Encounter 15 minutes   Spiritual Assessment Completed Yes   Routine   Type Initial   Spiritual/Taoist   Type Spiritual support   Assessment Calm; Approachable; Hopeful;Coping   Intervention Active listening;Explored feelings, thoughts, concerns;Explored coping resources;Nurtured hope;Sustaining presence/ Ministry of presence; Discussed belief system/Mormon practices/tahir;Discussed illness/injury and it's impact   Outcome Comfort;Expressed gratitude;Receptive

## 2020-06-28 NOTE — PROGRESS NOTES
Physical Therapy    Facility/Department: [facility] ONC/MED SURG  Initial Assessment    NAME: Alix Earl  : 1945  MRN: [id number]    Date of Service: 2020  Chief Complaint   Patient presents with    Shortness of Breath    Cough     Discharge Recommendations:  Patient would benefit from continued therapy after discharge   Assessment   Body structures, Functions, Activity limitations: Decreased functional mobility ; Decreased endurance;Decreased strength  Assessment: The pt ambulated 25 ft with a RW x CGA. He uses B AFO's for ambulation. He could benefit from a continuation of PT following his DC. Prognosis: Good  Decision Making: Medium Complexity  PT Education: Goals;PT Role;Plan of Care  REQUIRES PT FOLLOW UP: Yes  Activity Tolerance  Activity Tolerance: Patient limited by fatigue   Patient Diagnosis(es): The encounter diagnosis was Pneumonia due to organism. has a past medical history of Acute superficial gastritis without hemorrhage, Anastomotic stricture of stomach, Asthma, Atrial fibrillation ([address] Calculus of gallbladder without cholecystitis without obstruction, Chronic diastolic heart failure (HCC), Chronic rhinosinusitis, Class 2 severe obesity due to excess calories with serious comorbidity and body mass index (BMI) of 36.0 to 36.9 in adult ([address] E. coli septicemia ([address] E. coli UTI, Foot drop, right, Fracture of femur ([address] Gait disorder, Gastric out let obstruction, GERD (gastroesophageal reflux disease), Hallux valgus, acquired, bilateral, Hyperlipidemia, Hypertension, Impaired hearing, Internal hemorrhoids, Lymphedema of both lower extremities, Nausea & vomiting, OA (osteoarthritis) of knee, bilateral, Obesity, MARIAMA on CPAP, Osteoarthritis, Osteoarthritis of lumbar spine, S/P revision of total knee, Septicemia due to E. coli ([address] Simple chronic bronchitis ([address] Slow transit constipation, Unspecified sleep apnea, and UTI (urinary tract infection).    has a past surgical history that includes Finger amputation (1966); Gastric bypass surgery (1985); Carpal tunnel release; Colonoscopy; Rotator cuff repair; joint replacement (2004 & 2005); Hemorrhoid surgery; pr egd transoral biopsy single/multiple (N/A, 5/25/2018); Upper gastrointestinal endoscopy (08/13/2018); Upper gastrointestinal endoscopy (N/A, 8/13/2018); Upper gastrointestinal endoscopy (N/A, 8/13/2018); Upper gastrointestinal endoscopy (08/16/2018); pr egd flexible foreign body removal (N/A, 8/16/2018); Upper gastrointestinal endoscopy (8/16/2018); Upper gastrointestinal endoscopy (N/A, 10/1/2018); Upper gastrointestinal endoscopy (10/1/2018); Upper gastrointestinal endoscopy (N/A, 11/19/2018); Cystoscopy (01/02/2019); and Cystoscopy (N/A, 1/2/2019). Restrictions  Restrictions/Precautions  Restrictions/Precautions: Up as Tolerated  Required Braces or Orthoses?: Yes  Required Braces or Orthoses  Right Lower Extremity Brace: Ankle Foot Orthotics  Left Lower Extremity Brace: Erika Foot Orthotics  Vision/Hearing  Vision: Impaired  Vision Exceptions: Wears glasses at all times  Hearing: Exceptions to [facility]  Hearing Exceptions: Bilateral hearing aid     Subjective  General  Patient assessed for rehabilitation services?: Yes  Response To Previous Treatment: Not applicable  Family / Caregiver Present: No  Follows Commands: Within Functional Limits  Pain Screening  Patient Currently in Pain: Yes  Pain Assessment  Pain Assessment: 0-10  Pain Level: 1  Pain Location: Toe (Comment which one); Foot  Pain Orientation: Right  Vital Signs  Patient Currently in Pain: Yes       Orientation  Orientation  Overall Orientation Status: Within Normal Limits  Social/Functional History  Social/Functional History  Lives With: Spouse(2 daughters also live in the house)  Type of Home: House  Home Layout: Two level, Able to Live on Main level with bedroom/bathroom, Laundry in basement  Home Access: Stairs to enter with rails  Entrance Stairs - Number of Steps: 5  Entrance Stairs - Rails: Right  Bathroom Shower/Tub: Tub/Shower unit  Bathroom Toilet: Handicap height  Bathroom Equipment: Tub transfer bench, Grab bars in shower, Grab bars around toilet  Home Equipment: 4 wheeled walker, Cane  ADL Assistance: [address]: [place] [address] Responsibilities: Yes  Ambulation Assistance: Independent  Transfer Assistance: Independent  Active : No  Patient's  Info: Daughter drives  Occupation: Retired  Leisure & Hobbies: Social interaction, Um[employer]  Cognition      Objective     AROM RLE (degrees)  RLE AROM: WFL  AROM LLE (degrees)  LLE AROM : WFL  AROM RUE (degrees)  RUE AROM : WFL  AROM LUE (degrees)  LUE AROM : WFL  Strength RLE  Strength RLE: WFL  Strength LLE  Strength LLE: WFL  Strength RUE  Strength RUE: WFL  Strength LUE  Strength LUE: WFL     Sensation  Overall Sensation Status: WFL  Bed mobility  Supine to Sit: Minimal assistance  Sit to Supine: Minimal assistance  Scooting: Minimal assistance  Transfers  Sit to Stand: Minimal Assistance  Stand to sit: Minimal Assistance  Ambulation  Ambulation?: Yes  Ambulation 1  Surface: level tile  Device: Rolling Walker  Assistance: Contact guard assistance  Distance: amb 25 ft with a RW x CGA     Balance  Posture: Good  Sitting - Static: Good  Sitting - Dynamic: Fair  Standing - Static: Fair  Standing - Dynamic: [address] [facility]  Times per week: 5-6x wk  Current Treatment Recommendations: Strengthening, Endurance Training, Gait Training, Functional Mobility Training, Stair training, Safety Education & Training  Safety Devices  Type of devices: Left in bed, Call light within reach, Nurse notified    G-Code     OutComes Score    AM-PAC Score  AM-PAC Inpatient Mobility Raw Score : 19 (06/28/20 1540)  AM-PAC Inpatient T-Scale Score : 45.44 (06/28/20 1540)  Mobility Inpatient CMS 0-100% Score: 41.77 (06/28/20 1540)  Mobility Inpatient CMS G-Code Modifier : CK (06/28/20 1540)        Goals  Short term goals  Time Frame for Short term goals: 10 visits  Short term goal 1: transfers with SBA  Short term goal 2: amb 150 ft with a RW x SBA  Short term goal 3: ascend/descend 4 steps with SBA  Short term goal 4: exercise program x SBA  Patient Goals   Patient goals : Return home   1 of 10    Therapy Time   Individual Concurrent Group Co-treatment   Time In 5535         Time Out 1440         Minutes 25             1 of 135 [address] Jennifer Urban, PT

## 2020-06-29 LAB
EKG ATRIAL RATE: 138 BPM
EKG Q-T INTERVAL: 444 MS
EKG QRS DURATION: 134 MS
EKG QTC CALCULATION (BAZETT): 506 MS
EKG R AXIS: 168 DEGREES
EKG T AXIS: -5 DEGREES
EKG VENTRICULAR RATE: 78 BPM
HCT VFR BLD CALC: 27.7 % (ref 40.7–50.3)
HEMOGLOBIN: 8.1 G/DL (ref 13–17)
INTERVENTION: NORMAL
MCH RBC QN AUTO: 26.7 PG (ref 25.2–33.5)
MCHC RBC AUTO-ENTMCNC: 29.2 G/DL (ref 28.4–34.8)
MCV RBC AUTO: 91.4 FL (ref 82.6–102.9)
NRBC AUTOMATED: 0 PER 100 WBC
PDW BLD-RTO: 24 % (ref 11.8–14.4)
PLATELET # BLD: 177 K/UL (ref 138–453)
PMV BLD AUTO: 8.6 FL (ref 8.1–13.5)
RBC # BLD: 3.03 M/UL (ref 4.21–5.77)
WBC # BLD: 5.8 K/UL (ref 3.5–11.3)

## 2020-06-29 PROCEDURE — 97530 THERAPEUTIC ACTIVITIES: CPT

## 2020-06-29 PROCEDURE — 85027 COMPLETE CBC AUTOMATED: CPT

## 2020-06-29 PROCEDURE — 97110 THERAPEUTIC EXERCISES: CPT

## 2020-06-29 PROCEDURE — 1200000000 HC SEMI PRIVATE

## 2020-06-29 PROCEDURE — 6370000000 HC RX 637 (ALT 250 FOR IP): Performed by: INTERNAL MEDICINE

## 2020-06-29 PROCEDURE — 93010 ELECTROCARDIOGRAM REPORT: CPT | Performed by: INTERNAL MEDICINE

## 2020-06-29 PROCEDURE — 6360000002 HC RX W HCPCS: Performed by: INTERNAL MEDICINE

## 2020-06-29 PROCEDURE — 94660 CPAP INITIATION&MGMT: CPT

## 2020-06-29 PROCEDURE — 36415 COLL VENOUS BLD VENIPUNCTURE: CPT

## 2020-06-29 PROCEDURE — 94640 AIRWAY INHALATION TREATMENT: CPT

## 2020-06-29 PROCEDURE — 99233 SBSQ HOSP IP/OBS HIGH 50: CPT | Performed by: INTERNAL MEDICINE

## 2020-06-29 PROCEDURE — 99232 SBSQ HOSP IP/OBS MODERATE 35: CPT | Performed by: INTERNAL MEDICINE

## 2020-06-29 PROCEDURE — 2580000003 HC RX 258: Performed by: INTERNAL MEDICINE

## 2020-06-29 PROCEDURE — 6360000002 HC RX W HCPCS: Performed by: NURSE PRACTITIONER

## 2020-06-29 RX ORDER — POTASSIUM CHLORIDE 20 MEQ/1
20 TABLET, EXTENDED RELEASE ORAL 2 TIMES DAILY
Status: DISCONTINUED | OUTPATIENT
Start: 2020-06-29 | End: 2020-07-03 | Stop reason: HOSPADM

## 2020-06-29 RX ORDER — ALBUTEROL SULFATE 2.5 MG/3ML
2.5 SOLUTION RESPIRATORY (INHALATION) EVERY 4 HOURS PRN
Status: DISCONTINUED | OUTPATIENT
Start: 2020-06-29 | End: 2020-07-03 | Stop reason: HOSPADM

## 2020-06-29 RX ORDER — IBUPROFEN 400 MG/1
600 TABLET ORAL EVERY 6 HOURS PRN
Status: DISCONTINUED | OUTPATIENT
Start: 2020-06-29 | End: 2020-07-03 | Stop reason: HOSPADM

## 2020-06-29 RX ORDER — PANTOPRAZOLE SODIUM 40 MG/1
40 TABLET, DELAYED RELEASE ORAL DAILY
Status: DISCONTINUED | OUTPATIENT
Start: 2020-06-30 | End: 2020-07-03 | Stop reason: HOSPADM

## 2020-06-29 RX ORDER — CETIRIZINE HYDROCHLORIDE 10 MG/1
10 TABLET ORAL DAILY
Status: DISCONTINUED | OUTPATIENT
Start: 2020-06-30 | End: 2020-07-03 | Stop reason: HOSPADM

## 2020-06-29 RX ORDER — FLUTICASONE PROPIONATE 50 MCG
2 SPRAY, SUSPENSION (ML) NASAL DAILY
Status: DISCONTINUED | OUTPATIENT
Start: 2020-06-30 | End: 2020-07-03 | Stop reason: HOSPADM

## 2020-06-29 RX ORDER — GUAIFENESIN 600 MG/1
600 TABLET, EXTENDED RELEASE ORAL 2 TIMES DAILY
Status: DISCONTINUED | OUTPATIENT
Start: 2020-06-29 | End: 2020-07-03 | Stop reason: HOSPADM

## 2020-06-29 RX ORDER — GABAPENTIN 300 MG/1
300 CAPSULE ORAL 3 TIMES DAILY
Status: DISCONTINUED | OUTPATIENT
Start: 2020-06-29 | End: 2020-07-03 | Stop reason: HOSPADM

## 2020-06-29 RX ORDER — BUMETANIDE 1 MG/1
1 TABLET ORAL DAILY
Status: DISCONTINUED | OUTPATIENT
Start: 2020-06-30 | End: 2020-07-01

## 2020-06-29 RX ORDER — BUDESONIDE AND FORMOTEROL FUMARATE DIHYDRATE 160; 4.5 UG/1; UG/1
2 AEROSOL RESPIRATORY (INHALATION) 2 TIMES DAILY
Status: DISCONTINUED | OUTPATIENT
Start: 2020-06-29 | End: 2020-07-03 | Stop reason: HOSPADM

## 2020-06-29 RX ADMIN — ONDANSETRON 4 MG: 2 INJECTION INTRAMUSCULAR; INTRAVENOUS at 18:54

## 2020-06-29 RX ADMIN — CIPROFLOXACIN 750 MG: 750 TABLET, FILM COATED ORAL at 20:37

## 2020-06-29 RX ADMIN — IPRATROPIUM BROMIDE AND ALBUTEROL SULFATE 1 AMPULE: .5; 3 SOLUTION RESPIRATORY (INHALATION) at 17:00

## 2020-06-29 RX ADMIN — MEROPENEM 1 G: 1 INJECTION, POWDER, FOR SOLUTION INTRAVENOUS at 01:45

## 2020-06-29 RX ADMIN — MEROPENEM 1 G: 1 INJECTION, POWDER, FOR SOLUTION INTRAVENOUS at 18:08

## 2020-06-29 RX ADMIN — CIPROFLOXACIN 750 MG: 750 TABLET, FILM COATED ORAL at 09:45

## 2020-06-29 RX ADMIN — IPRATROPIUM BROMIDE AND ALBUTEROL SULFATE 1 AMPULE: .5; 3 SOLUTION RESPIRATORY (INHALATION) at 12:07

## 2020-06-29 RX ADMIN — MEROPENEM 1 G: 1 INJECTION, POWDER, FOR SOLUTION INTRAVENOUS at 09:45

## 2020-06-29 RX ADMIN — IBUPROFEN 600 MG: 400 TABLET, FILM COATED ORAL at 11:03

## 2020-06-29 RX ADMIN — ENOXAPARIN SODIUM 100 MG: 100 INJECTION SUBCUTANEOUS at 20:37

## 2020-06-29 RX ADMIN — ENOXAPARIN SODIUM 100 MG: 100 INJECTION SUBCUTANEOUS at 09:45

## 2020-06-29 RX ADMIN — IPRATROPIUM BROMIDE AND ALBUTEROL SULFATE 1 AMPULE: .5; 3 SOLUTION RESPIRATORY (INHALATION) at 20:49

## 2020-06-29 ASSESSMENT — PAIN DESCRIPTION - PAIN TYPE: TYPE: ACUTE PAIN

## 2020-06-29 ASSESSMENT — PAIN DESCRIPTION - LOCATION: LOCATION: ABDOMEN

## 2020-06-29 ASSESSMENT — PAIN SCALES - GENERAL
PAINLEVEL_OUTOF10: 5
PAINLEVEL_OUTOF10: 4

## 2020-06-29 NOTE — PROGRESS NOTES
Physical Therapy  Facility/Department: [facility] ONC/MED SURG  Daily Treatment Note  NAME: Laury Soto  : 1945  MRN: [id number]    Date of Service: 2020    Discharge Recommendations:  Further therapy recommended at discharge. PT Equipment Recommendations  Other: CTA,     Assessment   Body structures, Functions, Activity limitations: Decreased functional mobility ; Decreased strength;Decreased balance;Decreased endurance  Assessment: Pt grossly CGA for mobility, amb 75' RW CGA. fatigues. Pt would benefit from continued acute PT to address deficits. Prognosis: Good  Decision Making: Medium Complexity  PT Education: Plan of Care;PT Role;General Safety  REQUIRES PT FOLLOW UP: Yes  Activity Tolerance  Activity Tolerance: Patient Tolerated treatment well;Patient limited by fatigue;Patient limited by endurance     Patient Diagnosis(es): The encounter diagnosis was Pneumonia due to organism. has a past medical history of Acute superficial gastritis without hemorrhage, Anastomotic stricture of stomach, Asthma, Atrial fibrillation ([address] Calculus of gallbladder without cholecystitis without obstruction, Chronic diastolic heart failure (HCC), Chronic rhinosinusitis, Class 2 severe obesity due to excess calories with serious comorbidity and body mass index (BMI) of 36.0 to 36.9 in adult ([address] E. coli septicemia ([address] E. coli UTI, Foot drop, right, Fracture of femur ([address] Gait disorder, Gastric out let obstruction, GERD (gastroesophageal reflux disease), Hallux valgus, acquired, bilateral, Hyperlipidemia, Hypertension, Impaired hearing, Internal hemorrhoids, Lymphedema of both lower extremities, Nausea & vomiting, OA (osteoarthritis) of knee, bilateral, Obesity, MARIAMA on CPAP, Osteoarthritis, Osteoarthritis of lumbar spine, S/P revision of total knee, Septicemia due to E. coli ([address] Simple chronic bronchitis ([address] Slow transit constipation, Unspecified sleep apnea, and UTI (urinary tract infection). has a past surgical history that includes Finger amputation (1966); Gastric bypass surgery (1985); Carpal tunnel release; Colonoscopy; Rotator cuff repair; joint replacement (2004 & 2005); Hemorrhoid surgery; pr egd transoral biopsy single/multiple (N/A, 5/25/2018); Upper gastrointestinal endoscopy (08/13/2018); Upper gastrointestinal endoscopy (N/A, 8/13/2018); Upper gastrointestinal endoscopy (N/A, 8/13/2018); Upper gastrointestinal endoscopy (08/16/2018); pr egd flexible foreign body removal (N/A, 8/16/2018); Upper gastrointestinal endoscopy (8/16/2018); Upper gastrointestinal endoscopy (N/A, 10/1/2018); Upper gastrointestinal endoscopy (10/1/2018); Upper gastrointestinal endoscopy (N/A, 11/19/2018); Cystoscopy (01/02/2019); and Cystoscopy (N/A, 1/2/2019). Restrictions  Restrictions/Precautions  Restrictions/Precautions: Up as Tolerated  Required Braces or Orthoses?: Yes  Required Braces or Orthoses  Right Lower Extremity Brace: Ankle Foot Orthotics  Left Lower Extremity Brace: Erika Foot Orthotics  Subjective   General  Chart Reviewed: Yes  Response To Previous Treatment: Patient with no complaints from previous session. Family / Caregiver Present: No  Subjective  Subjective: RN and pt agreeable to PT. Pt alert in chair upon arrival.  Pain Screening  Patient Currently in Pain: Yes  Pain Assessment  Pain Assessment: 0-10  Pain Level: 4  Pain Type: Acute pain  Pain Location: Abdomen  Non-Pharmaceutical Pain Intervention(s): Ambulation/Increased Activity;Repositioned; Emotional support  Response to Pain Intervention: Patient Satisfied  Vital Signs  Patient Currently in Pain: Yes  Pre Treatment Pain Screening  Intervention List: Patient able to continue with treatment    Orientation  Orientation  Overall Orientation Status: Within Functional Limits  Cognition      Objective   Bed mobility  Comment: in chair upon arrival, returned to chair  Transfers  Sit to Stand: Contact guard assistance  Stand to sit: Contact guard assistance  Ambulation  Ambulation?: Yes  Ambulation 1  Surface: level tile  Device: Rolling Walker  Assistance: Contact guard assistance  Quality of Gait: slowed, no LOB, 2 30sec standing rest breaks  Distance: 75'  Stairs/Curb  Stairs?: No     Balance  Posture: Good  Sitting - Static: Good  Sitting - Dynamic: Good  Standing - Static: Good;-  Standing - Dynamic: Fair;+  Comments: RW used while assessing standing balane  Exercises  Comments: 12x Bilat: standing marches, standing mini squats, seated LAQ, seated marches           Goals  Short term goals  Time Frame for Short term goals: 10 visits  Short term goal 1: transfers with SBA  Short term goal 2: amb 150 ft with a RW x SBA  Short term goal 3: ascend/descend 4 steps with SBA  Short term goal 4: exercise program x SBA  Patient Goals   Patient goals : Return home    Plan    Plan  Times per week: 5-6x wk  Current Treatment Recommendations: Strengthening, Endurance Training, Gait Training, Functional Mobility Training, Stair training, Safety Education & Training  Safety Devices  Type of devices: Call light within reach, Nurse notified, Gait belt, Patient at risk for falls, Left in chair, All fall risk precautions in place  Restraints  Initially in place: No     Therapy Time   Individual Concurrent Group Co-treatment   Time In 1414         Time Out 1441         Minutes 27         Timed Code Treatment Minutes: 23 Minutes       Micki Rossi PT

## 2020-06-29 NOTE — PLAN OF CARE
Problem: RESPIRATORY  Intervention: Respiratory assessment  Note: NONINVASIVE VENTILATION    PROVIDE OPTIMAL VENTILATION/ACCEPTABLE SPO2   IMPLEMENT NONINVASIVE VENTILATION PROTOCOL   MAINTAIN ACCEPTABLE SPO2   ASSESS SKIN INTEGRITY/BREAKDOWN SCORE   PATIENT EDUCATION AS NEEDED   BIPAP AS NEEDED       BRONCHOSPASM/BRONCHOCONSTRICTION    IMPROVE  AERATION/BREATHSOUNDS  ADMINISTER BRONCHODILATOR THERAPY AS APPROPRIATE  ASSESS BREATH SOUNDS  PATIENT EDUCATION AS NEEDED

## 2020-06-29 NOTE — PROGRESS NOTES
[facility] - Progress note  Today's Date and Time: 6/29/2020, 10:56 AM    Impression :   · Rt basilar bronchopneumonia with isolation of MDR Enterobacter from sputum at [facility] 6-17-20  · Shortness of breath  · Cough  · Hemoptysis  · COPD  · MARIAMA  · Diastolic CHF  · COVID tests;  · 6-14-20: Negative    Recommendations:     · Continue Cipro 750 mg po BID  · Continue meropenem 1 gm IV q 8 hr  · Monitor for clinical response. · Decrease fluid intake    Medical Decision Making/Summary/Discussion:6/29/2020     ·   Infection Control Recommendations   · [place] Precautions  · Contact Isolation MDRO Enterobacter  · Droplet isolation    Antimicrobial Stewardship Recommendations     · Simplification of therapy  · Targeted therapy    Coordination of Outpatient Care:   · Estimated Length of IV antimicrobials:7-7-20  · Patient will need Midline Catheter Insertion: No  · Patient will need PICC line Insertion:No  · Patient will need: Home IV , [place],  SNF,  LTAC:TBD  · Patient will need outpatient wound care:No    Chief complaint/reason for consultation:   · RLL pneumonia with MDRO Enterobacter    History of Present Illness:   Kelly Carrillo is a 69y.o.-year-old  male who was initially admitted on 6/27/2020. Patient seen at the request of Dena Cordon. INITIAL HISTORY:    Patient patient was recently hospitalized at [facility] because of shortness of breath cough and hemoptysis. He was diagnosed as having congestive heart failure, paroxysmal atrial fibrillation and sick sinus syndrome with associated leg edema. He was treated by Dr. Alejandro Thrasher with improvement in symptoms. In addition he was also diagnosed as having a a right basilar infiltrate which was considered to be a community acquired pneumonia. The sputum on 6/17/28 grew a multidrug resistant Enterobacter. The patient was treated by Dr. Magdi Snow with a combination of Zosyn and vancomycin.   The antibiotics were given for a period of 7 days. The infectious diseases service  signed off on 6/26/20. The patient was experiencing hemoptysis while at [facility].  He has continued to experience hemoptysis cough and expectoration and consequently decided to come to [facility] as he felt that his treatment at [facility] had not accomplished control of his pneumonia. The patient has an extensive past medical history which includes COPD, MARIAMA, axis normal atrial fibrillation with sick sinus syndrome, chronic diastolic heart failure, chronic leg edema. At [facility] the patient is currently fairly stable but has edema of both lower extremities particularly on the left side. He has some crackles at several of the lung bases. His chest x-ray on his CT scan of the chest showed the presence of a similar broncho-pneumonia at the right base. Based on previous sputum culture at [facility] the organism that was isolated is resistant to Zosyn which he had received there. We will plan to treat him with a combination of ciprofloxacin 750 mg by mouth twice daily and meropenem 1 g IV every 8 hours, based on the pattern of sensitivity. CURRENT EVALUATION: 06/29/20     Patient seen and examined bedside. Labs and chart reviewed. No acute overnight events. Afebrile. T-max 99.6 °F.  Vital signs stable. No leukocytosis. Patient remains tachypnic  Leg edema present  Suggest decrease IV fluids from 75--15 CC/hr    Labs, X rays reviewed: 6/29/2020    BUN: 14  Cr: 0.74    WBC: 7.2-->5.8  Hb: 8.5-->8.1  Plat: 199-->177    Cultures:  Urine:  ·   Blood:  ·   Sputum :  · 6/17/20 Enterobacter cloacae  Wound:  ·   MRSA screen positive    Discussed with patient, RN. IM    I have personally reviewed the past medical history, past surgical history, medications, social history, and family history, and I have updated the database accordingly.   Past Medical History:     Past Medical History:   Diagnosis Date    Acute superficial gastritis without hemorrhage 5/26/2018    Anastomotic stricture of stomach     Asthma     Atrial fibrillation ([address]     On Xarelto 6/27/2020    Calculus of gallbladder without cholecystitis without obstruction 5/2/2017    Chronic diastolic heart failure ([address] 11/17/2017    Chronic rhinosinusitis 4/13/2015    Class 2 severe obesity due to excess calories with serious comorbidity and body mass index (BMI) of 36.0 to 36.9 in adult ([address] 11/17/2017    E. coli septicemia ([address]     E. 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 coli ([address]     Due to UTI     Simple chronic bronchitis ([address] 4/10/2018    Slow transit constipation 11/3/2016    Unspecified sleep apnea     UTI (urinary tract infection)        Past Surgical  History:     Past Surgical History:   Procedure Laterality Date    CARPAL TUNNEL RELEASE      bilateral    COLONOSCOPY      CYSTOSCOPY  01/02/2019    by Dr. Safia Randhawa N/A 1/2/2019    CYSTOSCOPY performed by Herve Aviles MD at [address]    first knuckle right index finger    GASTRIC BYPASS SURGERY  1985    vertical banded gastroplasty   [address] REPLACEMENT  2004 & 2005    bilateral knees    [facility] EGD FLEXIBLE FOREIGN BODY REMOVAL N/A 8/16/2018    EGD FOREIGN BODY REMOVAL performed by Alverto Beavers MD at [address] EGD TRANSORAL BIOPSY SINGLE/MULTIPLE N/A 5/25/2018    EGD BIOPSY performed by Yao Velazquez DO at [address] [facility]      left shoulder    UPPER GASTROINTESTINAL ENDOSCOPY  08/13/2018    removal of food bolus    UPPER GASTROINTESTINAL ENDOSCOPY N/A 8/13/2018    EGD FOREIGN BODY REMOVAL performed by Yao Velazquez DO at [facility] N/A 8/13/2018    EGD BIOPSY performed by Yao Velazquez DO at [facility]  08/16/2018    egd with balloon dilitation    UPPER GASTROINTESTINAL ENDOSCOPY  8/16/2018    EGD DILATION BALLOON performed by Alverto Beavers MD at [place] [address] N/A 10/1/2018    EGD BIOPSY performed by Robb Nelson MD at [address] [place] [address]  10/1/2018    EGD DILATION BALLOON performed by Robb Nelson MD at [address] [place] [address] N/A 11/19/2018    EGD DILATION BALLOON performed by Robb Nelson MD at [facility]       Medications:      [Held by provider] rivaroxaban  20 mg Oral Daily    enoxaparin  1 mg/kg Subcutaneous BID    meropenem  1 g Intravenous Q8H    ciprofloxacin  750 mg Oral 2 times per day    sodium chloride flush  10 mL Intravenous 2 times per day    ipratropium-albuterol  1 ampule Inhalation Q4H WA       Social History:     Social History     Socioeconomic History    Marital status:      Spouse name: Not on file    Number of children: Not on file    Years of education: Not on file    Highest education level: Not on file   Occupational History    Not on file   Social Needs    Financial resource strain: Not on file    Food insecurity     Worry: Not on file     Inability: Not on file    Transportation needs     Medical: Not on file     Non-medical: Not on file   Tobacco Use    Smoking status: Former Smoker     Packs/day: 1.00 Years: 20.00     Pack years: 20.00     Last attempt to quit: 1976     Years since quittin.9    Smokeless tobacco: Never Used   Substance and Sexual Activity    Alcohol use: No    Drug use: No    Sexual activity: Not on file   Lifestyle    Physical activity     Days per week: Not on file     Minutes per session: Not on file    Stress: Not on file   Relationships    Social connections     Talks on phone: Not on file     Gets together: Not on file     Attends [employer]ism service: Not on file     Active member of club or organization: Not on file     Attends meetings of clubs or organizations: Not on file     Relationship status: Not on file    Intimate partner violence     Fear of current or ex partner: Not on file     Emotionally abused: Not on file     Physically abused: Not on file     Forced sexual activity: Not on file   Other Topics Concern    Not on file   Social History Narrative    Not on file       Family History:     Family History   Problem Relation Age of Onset    Cancer Mother     Heart Attack Father         Allergies:   Darvocet [propoxyphene n-acetaminophen]; Other; and Oxycodone-acetaminophen     Review of Systems:   Constitutional: No fevers or chills. No systemic complaints  Head: No headaches  Eyes: No double vision or blurry vision. No conjunctival inflammation. ENT: No sore throat or runny nose. . No hearing loss, tinnitus or vertigo. Cardiovascular: No chest pain or palpitations. Shortness of breath. JONES  Lung: Shortness of breath, cough. Hemoptysis  Abdomen: No nausea, vomiting, diarrhea, or abdominal pain. Atif Bees No cramps. Genitourinary: No increased urinary frequency, or dysuria. No hematuria. No suprapubic or CVA pain  Musculoskeletal: No muscle aches or pains. No joint effusions, swelling or deformities. Bilateral leg edema  Hematologic: No bleeding or bruising. Neurologic: No headache, weakness, numbness, or tingling. Integument: No rash, no ulcers.   Psychiatric: No depression. Endocrine: No polyuria, no polydipsia, no polyphagia. Physical Examination :     Patient Vitals for the past 8 hrs:   BP Temp Temp src Pulse Resp SpO2   06/29/20 0800 (!) 105/56 98.2 °F (36.8 °C) Oral 80 20 97 %   06/29/20 0502 (!) 101/59 98 °F (36.7 °C) Oral 85 18 --     General Appearance: Awake, alert, and in no apparent distress  Head:  Normocephalic, no trauma  Eyes: Pupils equal, round, reactive to light and accommodation; extraocular movements intact; sclera anicteric; conjunctivae pink. No embolic phenomena. ENT: Oropharynx clear, without erythema, exudate, or thrush. No tenderness of sinuses. Mouth/throat: mucosa pink and moist. No lesions. Dentition in good repair. Neck:Supple, without lymphadenopathy. Thyroid normal, No bruits. Pulmonary/Chest: Clear to auscultation, without wheezes, rales, or rhonchi. No dullness to percussion. Cardiovascular: Regular rate and rhythm without murmurs, rubs, or gallops. Abdomen: Soft, non tender. Bowel sounds normal. No organomegaly  All four Extremities: No cyanosis, clubbing, edema, or effusions. Neurologic: No gross sensory or motor deficits. Skin: Warm and dry with good turgor. No signs of peripheral arterial or venous insufficiency. No ulcerations. No open wounds. Medical Decision Making -Laboratory:   I have independently reviewed/ordered the following labs:    CBC with Differential:   Recent Labs     06/27/20  1150  06/28/20  0601 06/29/20  0536   WBC 10.4   < > 7.2 5.8   HGB 9.6*   < > 8.5* 8.1*   HCT 32.0*   < > 28.9* 27.7*      < > 199 177   LYMPHOPCT 10*  --   --   --    MONOPCT 15*  --   --   --     < > = values in this interval not displayed.      BMP:   Recent Labs     06/27/20  2306 06/28/20  0601   * 134*   K 3.7 3.7   CL 95* 96*   CO2 25 27   BUN 14 14   CREATININE 0.69* 0.74   MG 2.0  --      Hepatic Function Panel:   Recent Labs     06/27/20  1150   PROT 6.8   LABALBU 3.4*   BILITOT 1.09   ALKPHOS 150*   ALT 33 AST 32     No results for input(s): RPR in the last 72 hours. No results for input(s): HIV in the last 72 hours. No results for input(s): BC in the last 72 hours. Lab Results   Component Value Date    MUCUS NOT REPORTED 06/27/2020    RBC 3.03 06/29/2020    RBC 4.39 09/13/2011    TRICHOMONAS NOT REPORTED 06/27/2020    WBC 5.8 06/29/2020    YEAST NOT REPORTED 06/27/2020    TURBIDITY CLEAR 06/27/2020     Lab Results   Component Value Date    CREATININE 0.74 06/28/2020    GLUCOSE 87 06/28/2020    GLUCOSE 99 09/13/2011       Medical Decision Making-Imaging:     EXAMINATION:   CTA OF THE CHEST 6/27/2020 2:08 pm       TECHNIQUE:   CTA of the chest was performed after the administration of intravenous   contrast.  Multiplanar reformatted images are provided for review.  MIP   images are provided for review. Dose modulation, iterative reconstruction,   and/or weight based adjustment of the mA/kV was utilized to reduce the   radiation dose to as low as reasonably achievable.       COMPARISON:   None.       HISTORY:   ORDERING SYSTEM PROVIDED HISTORY: R/o PE - hemoptysis x4d   TECHNOLOGIST PROVIDED HISTORY:   R/o PE - hemoptysis x4d       FINDINGS:   Pulmonary Arteries: Pulmonary arteries are adequately opacified for   evaluation.  No evidence of intraluminal filling defect to suggest pulmonary   embolism.  Main pulmonary artery is normal in caliber.       Mediastinum: No evidence of mediastinal lymphadenopathy.  The heart and   pericardium demonstrate no acute abnormality.  There is no acute abnormality   of the thoracic aorta.       Lungs/pleura: There are nodular areas of consolidation the right lower lobe. There is trace right-sided effusion. Ricka Backers is no pneumothorax.  The   tracheobronchial tree is patent.       Upper Abdomen:  There is cholelithiasis.       Soft Tissues/Bones: No acute bone or soft tissue abnormality. Ricka Backers is a   right thyroid lobe nodule measuring 2.4 x 1.6 cm.           Impression   No acute outpatient. Thank you for allowing us to participate in the care of this patient. Please call with questions. Berenice Nesbitt MD  Internal Medicine Resident, PGY-1  [facility]; [place], [place]  6/29/2020, 11:14 AM     ATTESTATION:    I have discussed the case, including pertinent history and exam findings with the residents and students. I have seen and examined the patient and the key elements of the encounter have been performed by me. I was present when the student obtained his information or examined the patient. I have reviewed the laboratory data, other diagnostic studies and discussed them with the residents. I have updated the medical record where necessary. I agree with the assessment, plan and orders as documented by the resident/ student.     Emma Harden MD.

## 2020-06-29 NOTE — PLAN OF CARE
Problem: Falls - Risk of:  Goal: Will remain free from falls  Description: Will remain free from falls  Outcome: Ongoing  Goal: Absence of physical injury  Description: Absence of physical injury  Outcome: Ongoing     Problem: Skin Integrity:  Goal: Will show no infection signs and symptoms  Description: Will show no infection signs and symptoms  Outcome: Ongoing  Goal: Absence of new skin breakdown  Description: Absence of new skin breakdown  Outcome: Ongoing     Problem: Pain:  Goal: Pain level will decrease  Description: Pain level will decrease  Outcome: Ongoing  Goal: Control of acute pain  Description: Control of acute pain  Outcome: Ongoing  Goal: Control of chronic pain  Description: Control of chronic pain  Outcome: Ongoing     Problem: Musculor/Skeletal Functional Status  Goal: Highest potential functional level  Outcome: Ongoing

## 2020-06-29 NOTE — PROGRESS NOTES
chest showed No acute or chronic pulmonary embolism, Nodular areas of consolidation in the right lower lobe consistent with pneumonia, and a trace fusion.                Patient was just recently hospitalizedfor acute on chronic diastolic heart failure with symptomatic shortness of breath, acute severe bradycardia with concern for metoprolol toxicity. Patient admitted to the ICU status post dopamine drip, glucagon for concern for metoprolol toxicity. He was treated for fluid overload with diuresis to a gradual taper of his home dose of Bumex of 1 mg twice daily. And stopped the hydrochlorothiazide and Aldactone, he was slowly titrated to Lopressor 25 mg twice daily from his Toprol under milligrams daily. He was followed by the pulmonary service and had several chest x-rays for cough which showed mild right pulmonary atelectasis without pneumonia and he was provided with pulmonary hygiene, iv steroids,  and bronchodilators and given decongestants and antihistamines. With the chronic cough there was concern of aspiration and he was placed on a thin liquid but dysphasia diet. P.T.  recommend skilled facility with weak and deconditioned state. Patient refuses         Hemoglobin 8.6  Hematocrit 26.7  COVID negative (6/14/2020)  MRSA PCR was positive. A final result for a sputum culture showed enterobacter cloacae and candida albicans. Susceptibility showed multi-drug resistance from the enterobacter  Blood cultures show no growth    Review of Systems:     Constitutional:  negative for chills, fevers, sweats  Respiratory:  Reports cough with dark sputum production; negative for dyspnea on exertion, wheezing  Cardiovascular:  negative for chest pain, chest pressure/discomfort, lower extremity edema, palpitations  Gastrointestinal:  negative for abdominal pain, constipation, diarrhea, nausea, vomiting  Neurological:  negative for dizziness, headache    Medications: Allergies:     Allergies   Allergen Reactions Onset    Cancer Mother     Heart Attack Father        Vitals:  BP (!) 105/56   Pulse 80   Temp 98.2 °F (36.8 °C) (Oral)   Resp 22   Ht 5' 10\" (1.778 m)   Wt 220 lb (99.8 kg)   SpO2 99%   BMI 31.57 kg/m²   Temp (24hrs), Av.4 °F (36.9 °C), Min:98 °F (36.7 °C), Max:98.9 °F (37.2 °C)    No results for input(s): POCGLU in the last 72 hours. I/O (24Hr):     Intake/Output Summary (Last 24 hours) at 2020 1900  Last data filed at 2020 1810  Gross per 24 hour   Intake 3177 ml   Output 800 ml   Net 2377 ml       Labs:  Hematology:  Recent Labs     20  1150 20  2306 20  0601 20  0536   WBC 10.4 8.8 7.2 5.8   RBC 3.57* 3.21* 3.19* 3.03*   HGB 9.6* 8.6* 8.5* 8.1*   HCT 32.0* 29.3* 28.9* 27.7*   MCV 89.6 91.3 90.6 91.4   MCH 26.9 26.8 26.6 26.7   MCHC 30.0 29.4 29.4 29.2   RDW 23.1* 23.8* 23.9* 24.0*    212 199 177   MPV 8.9 8.7 8.6 8.6   INR 1.2  --   --   --      Chemistry:  Recent Labs     20  1150 20  1346 20  2306 20  0601     --  133* 134*   K 3.6*  --  3.7 3.7   CL 95*  --  95* 96*   CO2 29  --  25 27   GLUCOSE 98  --  110* 87   BUN 15  --  14 14   CREATININE 0.67*  --  0.69* 0.74   MG  --   --  2.0  --    ANIONGAP 11  --  13 11   LABGLOM >60  --  >60 >60   GFRAA >60  --  >60 >60   CALCIUM 8.5*  --  7.9* 8.0*   PROBNP 4,433*  --   --   --    TROPHS 54* 49*  --   --      Recent Labs     20  1150   PROT 6.8   LABALBU 3.4*   AST 32   ALT 33   ALKPHOS 150*   BILITOT 1.09     ABG:No results found for: POCPH, PHART, PH, POCPCO2, EDY7FIP, PCO2, POCPO2, PO2ART, PO2, POCHCO3, VGO5FOE, HCO3, NBEA, PBEA, BEART, BE, THGBART, THB, KCK4VAP, VTGA6YCK, H8SPELRX, O2SAT, FIO2  Lab Results   Component Value Date/Time    SPECIAL NOT REPORTED 10/21/2019 01:25 PM     Lab Results   Component Value Date/Time    CULTURE NO GROWTH 10/21/2019 01:25 PM       Radiology:  Xr Chest Standard (2 Vw)    Result Date: 2020  Subtle right basilar infiltrate

## 2020-06-29 NOTE — CARE COORDINATION
Case Management Initial Discharge Plan  Kelly Carrillo,             Met with:patient to discuss discharge plans. Information verified: address, contacts, phone number, , insurance Yes    Emergency Contact/Next of Kin name & number: Rahul Alvarez (spouse) [phone]    PCP: Michael Zaman PA-C  Date of last visit: 2 months ago    Insurance Provider: [employer]/Medicare    Discharge Planning    Living Arrangements:  Spouse/Significant Other, Children   Support Systems:  Spouse/Significant Other, Family Members, Home Care Staff    Home has 2 stories  5 stairs to climb to get into front door, 1 flight stairs to climb to reach second floor  Location of bedroom/bathroom in home main    Patient able to perform ADL's:Assisted    Current Services (outpatient & in home) [facility]  DME equipment: cane,walker,wheelchair, cpap  DME provider:     Receiving oral anticoagulation therapy? Yes,Xarelto    If indicated:   Physician managing anticoagulation treatment: Michael Zaman PA-C  Where does patient obtain lab work for ATC treatment? Potential Assistance Needed:  Home Care    Patient agreeable to home care: Yes  Freedom of choice provided:  yes    Prior SNF/Rehab Placement and Facility: none  Agreeable to SNF/Rehab: No  [place] of choice provided: n/a     Evaluation: n/a    Expected Discharge date:  20    Patient expects to be discharged to:  Home  Follow Up Appointment: Best Day/ Time:      Transportation provider: geovany  Transportation arrangements needed for discharge: No    Readmission Risk              Risk of Unplanned Readmission:        15             Does patient have a readmission risk score greater than 14?: Yes  If yes, follow-up appointment must be made within 7 days of discharge.      Goals of Care: breathe easier      Discharge Plan: Home with family and [facility]          Electronically signed by Todd Souza RN on 20 at 9:38 AM EDT

## 2020-06-30 LAB
ABSOLUTE EOS #: 0.17 K/UL (ref 0–0.44)
ABSOLUTE IMMATURE GRANULOCYTE: 0.04 K/UL (ref 0–0.3)
ABSOLUTE LYMPH #: 0.67 K/UL (ref 1.1–3.7)
ABSOLUTE MONO #: 0.67 K/UL (ref 0.1–1.2)
ANION GAP SERPL CALCULATED.3IONS-SCNC: 13 MMOL/L (ref 9–17)
BASOPHILS # BLD: 1 % (ref 0–2)
BASOPHILS ABSOLUTE: 0.04 K/UL (ref 0–0.2)
BUN BLDV-MCNC: 10 MG/DL (ref 8–23)
BUN/CREAT BLD: ABNORMAL (ref 9–20)
CALCIUM SERPL-MCNC: 8.1 MG/DL (ref 8.6–10.4)
CHLORIDE BLD-SCNC: 101 MMOL/L (ref 98–107)
CO2: 24 MMOL/L (ref 20–31)
CREAT SERPL-MCNC: 0.63 MG/DL (ref 0.7–1.2)
DIFFERENTIAL TYPE: ABNORMAL
EOSINOPHILS RELATIVE PERCENT: 4 % (ref 1–4)
GFR AFRICAN AMERICAN: >60 ML/MIN
GFR NON-AFRICAN AMERICAN: >60 ML/MIN
GFR SERPL CREATININE-BSD FRML MDRD: ABNORMAL ML/MIN/{1.73_M2}
GFR SERPL CREATININE-BSD FRML MDRD: ABNORMAL ML/MIN/{1.73_M2}
GLUCOSE BLD-MCNC: 95 MG/DL (ref 70–99)
HCT VFR BLD CALC: 27.1 % (ref 40.7–50.3)
HEMOGLOBIN: 7.9 G/DL (ref 13–17)
IMMATURE GRANULOCYTES: 1 %
LYMPHOCYTES # BLD: 16 % (ref 24–43)
MCH RBC QN AUTO: 26.8 PG (ref 25.2–33.5)
MCHC RBC AUTO-ENTMCNC: 29.2 G/DL (ref 28.4–34.8)
MCV RBC AUTO: 91.9 FL (ref 82.6–102.9)
MONOCYTES # BLD: 16 % (ref 3–12)
MORPHOLOGY: ABNORMAL
NRBC AUTOMATED: 0 PER 100 WBC
PDW BLD-RTO: 24.3 % (ref 11.8–14.4)
PLATELET # BLD: 170 K/UL (ref 138–453)
PLATELET ESTIMATE: ABNORMAL
PMV BLD AUTO: 8.7 FL (ref 8.1–13.5)
POTASSIUM SERPL-SCNC: 4 MMOL/L (ref 3.7–5.3)
RBC # BLD: 2.95 M/UL (ref 4.21–5.77)
RBC # BLD: ABNORMAL 10*6/UL
SEG NEUTROPHILS: 62 % (ref 36–65)
SEGMENTED NEUTROPHILS ABSOLUTE COUNT: 2.61 K/UL (ref 1.5–8.1)
SODIUM BLD-SCNC: 138 MMOL/L (ref 135–144)
WBC # BLD: 4.2 K/UL (ref 3.5–11.3)
WBC # BLD: ABNORMAL 10*3/UL

## 2020-06-30 PROCEDURE — 85025 COMPLETE CBC W/AUTO DIFF WBC: CPT

## 2020-06-30 PROCEDURE — 6370000000 HC RX 637 (ALT 250 FOR IP): Performed by: INTERNAL MEDICINE

## 2020-06-30 PROCEDURE — 2580000003 HC RX 258: Performed by: INTERNAL MEDICINE

## 2020-06-30 PROCEDURE — 1200000000 HC SEMI PRIVATE

## 2020-06-30 PROCEDURE — 99232 SBSQ HOSP IP/OBS MODERATE 35: CPT | Performed by: INTERNAL MEDICINE

## 2020-06-30 PROCEDURE — 94760 N-INVAS EAR/PLS OXIMETRY 1: CPT

## 2020-06-30 PROCEDURE — 36415 COLL VENOUS BLD VENIPUNCTURE: CPT

## 2020-06-30 PROCEDURE — 94640 AIRWAY INHALATION TREATMENT: CPT

## 2020-06-30 PROCEDURE — 6360000002 HC RX W HCPCS: Performed by: INTERNAL MEDICINE

## 2020-06-30 PROCEDURE — 97116 GAIT TRAINING THERAPY: CPT

## 2020-06-30 PROCEDURE — 80048 BASIC METABOLIC PNL TOTAL CA: CPT

## 2020-06-30 PROCEDURE — 97535 SELF CARE MNGMENT TRAINING: CPT

## 2020-06-30 PROCEDURE — 97110 THERAPEUTIC EXERCISES: CPT

## 2020-06-30 PROCEDURE — 99233 SBSQ HOSP IP/OBS HIGH 50: CPT | Performed by: INTERNAL MEDICINE

## 2020-06-30 RX ADMIN — FLUTICASONE PROPIONATE 2 SPRAY: 50 SPRAY, METERED NASAL at 09:17

## 2020-06-30 RX ADMIN — POTASSIUM CHLORIDE 20 MEQ: 1500 TABLET, EXTENDED RELEASE ORAL at 20:57

## 2020-06-30 RX ADMIN — GABAPENTIN 300 MG: 300 CAPSULE ORAL at 20:57

## 2020-06-30 RX ADMIN — MEROPENEM 1 G: 1 INJECTION, POWDER, FOR SOLUTION INTRAVENOUS at 17:22

## 2020-06-30 RX ADMIN — IPRATROPIUM BROMIDE AND ALBUTEROL SULFATE 1 AMPULE: .5; 3 SOLUTION RESPIRATORY (INHALATION) at 08:42

## 2020-06-30 RX ADMIN — GABAPENTIN 300 MG: 300 CAPSULE ORAL at 09:17

## 2020-06-30 RX ADMIN — IBUPROFEN 600 MG: 400 TABLET, FILM COATED ORAL at 19:28

## 2020-06-30 RX ADMIN — IPRATROPIUM BROMIDE AND ALBUTEROL SULFATE 1 AMPULE: .5; 3 SOLUTION RESPIRATORY (INHALATION) at 20:46

## 2020-06-30 RX ADMIN — PANTOPRAZOLE SODIUM 40 MG: 40 TABLET, DELAYED RELEASE ORAL at 09:17

## 2020-06-30 RX ADMIN — GUAIFENESIN 600 MG: 600 TABLET, EXTENDED RELEASE ORAL at 09:17

## 2020-06-30 RX ADMIN — IPRATROPIUM BROMIDE AND ALBUTEROL SULFATE 1 AMPULE: .5; 3 SOLUTION RESPIRATORY (INHALATION) at 14:11

## 2020-06-30 RX ADMIN — POTASSIUM CHLORIDE 20 MEQ: 1500 TABLET, EXTENDED RELEASE ORAL at 09:17

## 2020-06-30 RX ADMIN — BUDESONIDE AND FORMOTEROL FUMARATE DIHYDRATE 2 PUFF: 160; 4.5 AEROSOL RESPIRATORY (INHALATION) at 08:43

## 2020-06-30 RX ADMIN — RIVAROXABAN 20 MG: 20 TABLET, FILM COATED ORAL at 09:17

## 2020-06-30 RX ADMIN — CIPROFLOXACIN 750 MG: 750 TABLET, FILM COATED ORAL at 09:17

## 2020-06-30 RX ADMIN — CIPROFLOXACIN 750 MG: 750 TABLET, FILM COATED ORAL at 20:57

## 2020-06-30 RX ADMIN — BUMETANIDE 1 MG: 1 TABLET ORAL at 09:17

## 2020-06-30 RX ADMIN — MEROPENEM 1 G: 1 INJECTION, POWDER, FOR SOLUTION INTRAVENOUS at 09:17

## 2020-06-30 RX ADMIN — BUDESONIDE AND FORMOTEROL FUMARATE DIHYDRATE 2 PUFF: 160; 4.5 AEROSOL RESPIRATORY (INHALATION) at 20:46

## 2020-06-30 RX ADMIN — CETIRIZINE HYDROCHLORIDE 10 MG: 10 TABLET ORAL at 09:17

## 2020-06-30 RX ADMIN — GABAPENTIN 300 MG: 300 CAPSULE ORAL at 14:13

## 2020-06-30 RX ADMIN — MEROPENEM 1 G: 1 INJECTION, POWDER, FOR SOLUTION INTRAVENOUS at 01:18

## 2020-06-30 RX ADMIN — GUAIFENESIN 600 MG: 600 TABLET, EXTENDED RELEASE ORAL at 20:57

## 2020-06-30 ASSESSMENT — ENCOUNTER SYMPTOMS
NAUSEA: 0
VOMITING: 0
COLOR CHANGE: 0
ABDOMINAL PAIN: 0
WHEEZING: 0
EYE DISCHARGE: 0
SHORTNESS OF BREATH: 1
BLOOD IN STOOL: 0
COUGH: 1
BACK PAIN: 0

## 2020-06-30 ASSESSMENT — PAIN DESCRIPTION - ORIENTATION: ORIENTATION: RIGHT;LEFT

## 2020-06-30 ASSESSMENT — PAIN SCALES - GENERAL
PAINLEVEL_OUTOF10: 3
PAINLEVEL_OUTOF10: 3

## 2020-06-30 ASSESSMENT — PAIN DESCRIPTION - PAIN TYPE: TYPE: CHRONIC PAIN

## 2020-06-30 ASSESSMENT — PAIN DESCRIPTION - FREQUENCY: FREQUENCY: CONTINUOUS

## 2020-06-30 ASSESSMENT — PAIN DESCRIPTION - LOCATION: LOCATION: LEG

## 2020-06-30 ASSESSMENT — PAIN DESCRIPTION - DESCRIPTORS: DESCRIPTORS: ACHING

## 2020-06-30 NOTE — PLAN OF CARE
Problem: Falls - Risk of:  Goal: Will remain free from falls  Description: Will remain free from falls  6/30/2020 0340 by Aly Huerta RN  Outcome: Ongoing     Problem: Falls - Risk of:  Goal: Absence of physical injury  Description: Absence of physical injury  6/30/2020 0340 by Aly Huerta RN  Outcome: Ongoing     Problem: Skin Integrity:  Goal: Will show no infection signs and symptoms  Description: Will show no infection signs and symptoms  6/30/2020 0340 by Aly Huerta RN  Outcome: Ongoing     Problem: Skin Integrity:  Goal: Absence of new skin breakdown  Description: Absence of new skin breakdown  6/30/2020 0340 by Aly Huerta RN  Outcome: Ongoing     Problem: Pain:  Goal: Pain level will decrease  Description: Pain level will decrease  6/30/2020 0340 by Aly Huerta RN  Outcome: Ongoing     Problem: Pain:  Goal: Control of acute pain  Description: Control of acute pain  6/30/2020 0340 by Aly Huerta RN  Outcome: Ongoing     Problem: Pain:  Goal: Control of chronic pain  Description: Control of chronic pain  6/30/2020 0340 by Aly Huerta RN  Outcome: Ongoing     Problem: Musculor/Skeletal Functional Status  Goal: Highest potential functional level  6/30/2020 0340 by Aly Huerta RN  Outcome: Ongoing

## 2020-06-30 NOTE — PROGRESS NOTES
[facility]    Progress Note    6/30/2020    12:39 PM    Name:   Diana Swain  MRN:     [id number]     Acct:      [de-identified]   Room:   [address] [place] [address] Day:  3  Admit Date:  6/27/2020 11:19 AM    PCP:   Daniela Escobar PA-C  Code Status:  Full Code    Subjective:     C/C:   Chief Complaint   Patient presents with    Shortness of Breath    Cough     Interval History Status: improved. Doing well. SOB and cough improved  No fever  Still on IV abx. awaiting ID rec to PO    Brief History:   Patient presents to the hospital with a complaint of cough, hemoptysis,  shortness of breath and chest pain.  Patient states that he was just recently admitted and discharged from [facility] in which he has been discussing with them that he has been having no hemoptysis and nothing was done.  While in the emergency room he coughed up blood clot per ERs documentation was approximately a teaspoon's worth.  He states that he has been using his home nebulizers with mild relief but denies any fevers, chills, nausea, vomiting, abdominal pain, or body aches, melena, or  hematochezia    His work-up in the emergency room revealed a metabolic panel with a sodium of 135 potassium 3.6 chloride 95 CO2 29 creatinine 0.67 BUN of 15, his proBNP of 4433 and a high sensitive troponin of 49, her function essentially normal with a mild elevation in the alk phos at 150, his hemogram was without acute leukocytosis with a WBC of 10.4 with a mild anemia with a hemoglobin of 9.6 and a hematocrit of 32.0 RBCs 3.57 RDW 23.1, his INR was 1.2 with a PT of 12.3, PTT of 32.4, urinalysis unremarkable with a trace leukoesterase but no white blood cell count, negative for nitrate  The initial chest x-ray showed Subtle right basilar infiltrate representing atelectasis versus pneumonia.   CTA of the chest showed No acute or chronic pulmonary embolism, Nodular areas of consolidation in the right lower lobe consistent with pneumonia, and a trace fusion. Review of Systems:     Review of Systems   Constitutional: Negative for appetite change and fever. HENT: Negative for congestion and ear discharge. Eyes: Negative for discharge and visual disturbance. Respiratory: Positive for cough and shortness of breath. Negative for wheezing. Cardiovascular: Negative for chest pain, palpitations and leg swelling. Gastrointestinal: Negative for abdominal pain, blood in stool, nausea and vomiting. Endocrine: Negative for cold intolerance and heat intolerance. Genitourinary: Negative for dysuria and frequency. Musculoskeletal: Negative for arthralgias, back pain and joint swelling. Skin: Negative for color change and rash. Neurological: Negative for dizziness, tremors, seizures and light-headedness. Psychiatric/Behavioral: Negative for agitation and confusion. Medications: Allergies:     Allergies   Allergen Reactions    Darvocet [Propoxyphene N-Acetaminophen] Hives    Other Hives    Oxycodone-Acetaminophen        Current Meds:   Scheduled Meds:    rivaroxaban  20 mg Oral Daily with breakfast    pantoprazole  40 mg Oral Daily    bumetanide  1 mg Oral Daily    cetirizine  10 mg Oral Daily    fluticasone  2 spray Nasal Daily    budesonide-formoterol  2 puff Inhalation BID    gabapentin  300 mg Oral TID    guaiFENesin  600 mg Oral BID    potassium chloride  20 mEq Oral BID    meropenem  1 g Intravenous Q8H    ciprofloxacin  750 mg Oral 2 times per day    sodium chloride flush  10 mL Intravenous 2 times per day    ipratropium-albuterol  1 ampule Inhalation Q4H WA     Continuous Infusions:    sodium chloride 15 mL/hr at 06/29/20 1401     PRN Meds: ibuprofen, albuterol, sodium chloride flush, magnesium hydroxide, promethazine **OR** ondansetron, nicotine, albuterol    Data:     Past Medical History:   has a past medical history of Acute superficial gastritis without hemorrhage, Anastomotic stricture of stomach, Asthma, Atrial fibrillation ([facility] [address] Calculus of gallbladder without cholecystitis without obstruction, Chronic diastolic heart failure (HCC), Chronic rhinosinusitis, Class 2 severe obesity due to excess calories with serious comorbidity and body mass index (BMI) of 36.0 to 36.9 in adult ([facility] [address] E. coli septicemia ([facility] [address] E. coli UTI, Foot drop, right, Fracture of femur ([facility] [address] Gait disorder, Gastric out let obstruction, GERD (gastroesophageal reflux disease), Hallux valgus, acquired, bilateral, Hyperlipidemia, Hypertension, Impaired hearing, Internal hemorrhoids, Lymphedema of both lower extremities, Nausea & vomiting, OA (osteoarthritis) of knee, bilateral, Obesity, MARIAMA on CPAP, Osteoarthritis, Osteoarthritis of lumbar spine, S/P revision of total knee, Septicemia due to E. coli ([facility] [address] Simple chronic bronchitis ([facility] [address] Slow transit constipation, Unspecified sleep apnea, and UTI (urinary tract infection). Social History:   reports that he quit smoking about 43 years ago. He has a 20.00 pack-year smoking history. He has never used smokeless tobacco. He reports that he does not drink alcohol or use drugs. Family History:   Family History   Problem Relation Age of Onset   Olpradip Juarez Cancer Mother     Heart Attack Father        Vitals:  /62   Pulse 87   Temp 98.3 °F (36.8 °C) (Oral)   Resp 18   Ht 5' 10\" (1.778 m)   Wt 220 lb 12.8 oz (100.2 kg)   SpO2 98%   BMI 31.68 kg/m²   Temp (24hrs), Av.3 °F (36.8 °C), Min:98.2 °F (36.8 °C), Max:98.3 °F (36.8 °C)    No results for input(s): POCGLU in the last 72 hours. I/O (24Hr):     Intake/Output Summary (Last 24 hours) at 2020 1239  Last data filed at 2020 0512  Gross per 24 hour   Intake 2324 ml   Output 625 ml   Net 1699 ml       Labs:  Hematology:  Recent Labs     20  0601 20  0536 20  0538   WBC 7.2 5.8 4.2   RBC 3.19* 3.03* 2.95*   HGB 8.5* 8.1* 7.9*   HCT 28.9* 27.7* Assessment:        Hospital Problems           Last Modified POA    * (Principal) Hospital-acquired bacterial pneumonia 6/28/2020 Yes    Atrial fibrillation ([facility] [address] 6/28/2020 Yes    Hyperlipidemia 6/28/2020 Yes    GERD (gastroesophageal reflux disease) 6/28/2020 Yes    Hypertension 6/28/2020 Yes    Slow transit constipation 6/28/2020 Yes    MARIAMA on CPAP 6/28/2020 Yes    Chronic diastolic heart failure ([facility] [address] 6/28/2020 Yes    Class 2 severe obesity due to excess calories with serious comorbidity and body mass index (BMI) of 36.0 to 36.9 in adult [facility]) 6/28/2020 Yes    Simple chronic bronchitis ([facility] [address] 6/28/2020 Yes          Plan:      1. Rt basilar bronchopneumonia with MDR Enterobacter. Continue Cipro 750 mg BID, Meropenem 1 lindsey Q 8 hrs. Await ID rec for PO in anticipation for discharge tomorrow or next  2. Chronic diastolic CHF. Continue Bumex, which was resume this morning  3. Afib. Rate controlled. Continue Xeralto   4. Morbid obesity with MARIAMA on CPAP. Continue   5.  Discharge planning - home tomorrow or next    Marisela Braun MD  6/30/2020  12:39 PM

## 2020-06-30 NOTE — PROGRESS NOTES
Occupational Therapy  Facility/Department: [facility] ONC/MED SURG  Daily Treatment Note  NAME: Abraham Bobo  : 1945  MRN: [id number]    Date of Service: 2020    Discharge Recommendations:  Patient would benefit from continued therapy after discharge. Assessment   Performance deficits / Impairments: Decreased functional mobility ; Decreased strength;Decreased endurance;Decreased ADL status; Decreased safe awareness;Decreased high-level IADLs  Prognosis: Good  OT Education: OT Role;Energy Conservation;Transfer Training  Patient Education: purpose of OT; proper hand and foot placement; walker management; deep breathing; pacing  Barriers to Learning: pt demo F carry over  REQUIRES OT FOLLOW UP: Yes  Activity Tolerance  Activity Tolerance: Patient Tolerated treatment well  Activity Tolerance: pt limited by SOB  Safety Devices  Safety Devices in place: Yes  Type of devices: Nurse notified;Gait belt;Patient at risk for falls;Call light within reach; Left in chair         Patient Diagnosis(es): The encounter diagnosis was Pneumonia due to organism.       has a past medical history of Acute superficial gastritis without hemorrhage, Anastomotic stricture of stomach, Asthma, Atrial fibrillation ([address] Calculus of gallbladder without cholecystitis without obstruction, Chronic diastolic heart failure (HCC), Chronic rhinosinusitis, Class 2 severe obesity due to excess calories with serious comorbidity and body mass index (BMI) of 36.0 to 36.9 in adult ([address] E. coli septicemia ([facility] [address] E. coli UTI, Foot drop, right, Fracture of femur ([address] Gait disorder, Gastric out let obstruction, GERD (gastroesophageal reflux disease), Hallux valgus, acquired, bilateral, Hyperlipidemia, Hypertension, Impaired hearing, Internal hemorrhoids, Lymphedema of both lower extremities, Nausea & vomiting, OA (osteoarthritis) of knee, bilateral, Obesity, MARIAMA on CPAP, Osteoarthritis, Osteoarthritis of lumbar spine, S/P revision of total knee, and deep breathing  Balance  Sitting Balance: Stand by assistance  Standing Balance: Contact guard assistance  Standing Balance  Time: pt tolerated approx 4-5 min  Activity: during mobility  Comment: utilizing RW  Functional Mobility  Functional - Mobility Device: Rolling Walker  Activity: To/from bathroom  Assist Level: Contact guard assistance  Functional Mobility Comments: req verbal instructions on walker management and proper hand placement  Bed mobility  Comment: pt seated in chair at start of session, retiring to chair at session end  Transfers  Stand Step Transfers: Contact guard assistance  Sit to stand: Minimal assistance  Stand to sit: Minimal assistance  Transfer Comments: utilizing RW  Cognition  Overall Cognitive Status: WFL    Pt pleasant and cooperative throughout session. ADL tasks of grooming and toileting completed see above for LOF. Pt req increased time sec to increased SOB. 0 LOB noted throughout session. At session end pt seated in chair with BLE elevated and call light in reach.    Plan   Plan  Times per week: 4-5x/week   Current Treatment Recommendations: Endurance Training, Equipment Evaluation, Education, & procurement, Self-Care / ADL, Functional Mobility Training, Safety Education & Training, Balance Training, Patient/Caregiver Education & Training   Cont POC    Goals  Short term goals  Time Frame for Short term goals: By discharge:  Short term goal 1: Pt will complete functional transfers/mobility with Mod I, use of AD PRN  Short term goal 2: Pt will complete ADL tasks with Mod I, use of AE/AD PRN  Short term goal 3: Pt will demonstrate ~15 minutes of dynamic standing tolerance during functional task  Short term goal 4: Pt will demonstrate good safety awareness duing all functional mobility/ADL tasks        Therapy Time   Individual Concurrent Group Co-treatment   Time In 1120         Time Out 1209         Minutes 49         Timed Code Treatment Minutes: [address] ESCOBAR/L

## 2020-06-30 NOTE — PROGRESS NOTES
[facility] - Progress note  Today's Date and Time: 6/30/2020, 11:51 AM    Impression :   · Rt basilar bronchopneumonia with isolation of MDR Enterobacter from sputum at [facility] 6-17-20  · Shortness of breath  · Cough  · Hemoptysis  · COPD  · MARIAMA  · Diastolic CHF  · COVID tests;  · 6-14-20: Negative    Recommendations:     · Continue Cipro 750 mg po BID  · Continue meropenem 1 gm IV q 8 hr  · Monitor for clinical response  · Diuresis  · Fluid restriction    Medical Decision Making/Summary/Discussion:6/30/2020   ·   Infection Control Recommendations   · [place] Precautions  · Contact Isolation MDRO Enterobacter  · Droplet isolation    Antimicrobial Stewardship Recommendations     · Simplification of therapy  · Targeted therapy    Coordination of Outpatient Care:   · Estimated Length of IV antimicrobials:7-7-20  · Patient will need Midline Catheter Insertion: No  · Patient will need PICC line Insertion:No  · Patient will need: Home IV , [place],  SNF,  LTAC:TBD  · Patient will need outpatient wound care:No    Chief complaint/reason for consultation:   · RLL pneumonia with MDRO Enterobacter    History of Present Illness:   Lyn Herrera is a 69y.o.-year-old  male who was initially admitted on 6/27/2020. Patient seen at the request of Fernandez Cruz. INITIAL HISTORY:    Patient patient was recently hospitalized at [facility] because of shortness of breath cough and hemoptysis. He was diagnosed as having congestive heart failure, paroxysmal atrial fibrillation and sick sinus syndrome with associated leg edema. He was treated by Dr. Virginia Gregory with improvement in symptoms. In addition he was also diagnosed as having a a right basilar infiltrate which was considered to be a community acquired pneumonia. The sputum on 6/17/28 grew a multidrug resistant Enterobacter. The patient was treated by Dr. Michelle Arguello with a combination of Zosyn and vancomycin.   The antibiotics were given for a period of 7 days. The infectious diseases service  signed off on 6/26/20. The patient was experiencing hemoptysis while at [facility].  He has continued to experience hemoptysis cough and expectoration and consequently decided to come to [facility] as he felt that his treatment at [facility] had not accomplished control of his pneumonia. The patient has an extensive past medical history which includes COPD, MARIAMA, axis normal atrial fibrillation with sick sinus syndrome, chronic diastolic heart failure, chronic leg edema. At [facility] the patient is currently fairly stable but has edema of both lower extremities particularly on the left side. He has some crackles at several of the lung bases. His chest x-ray on his CT scan of the chest showed the presence of a similar broncho-pneumonia at the right base. Based on previous sputum culture at [facility] the organism that was isolated is resistant to Zosyn which he had received there. We will plan to treat him with a combination of ciprofloxacin 750 mg by mouth twice daily and meropenem 1 g IV every 8 hours, based on the pattern of sensitivity. CURRENT EVALUATION: 06/30/20     Patient seen and examined bedside. Labs and chart reviewed. No acute overnight events. Afebrile. Vital signs stable. No leukocytosis. No respiratory distress today. Patient reports his SOB improved. Bronchodilators per primary. Continuing on Bumex  Bilateral lower extremity edema persisting. Labs, X rays reviewed: 6/30/2020    BUN: 14  Cr: 0.74    WBC: 7.2-->5.8  Hb: 8.5-->8.1  Plat: 199-->177    Cultures:  Urine:  ·   Blood:  ·   Sputum :  · 6/17/20 Enterobacter cloacae  Wound:  ·   MRSA screen positive    Discussed with patient, RN. IM    I have personally reviewed the past medical history, past surgical history, medications, social history, and family history, and I have updated the database accordingly.   Past Medical History:     Past Medical History:   Diagnosis Date    Acute superficial gastritis without hemorrhage 5/26/2018    Anastomotic stricture of stomach     Asthma     Atrial fibrillation ([address]     On Xarelto 6/27/2020    Calculus of gallbladder without cholecystitis without obstruction 5/2/2017    Chronic diastolic heart failure ([address] 11/17/2017    Chronic rhinosinusitis 4/13/2015    Class 2 severe obesity due to excess calories with serious comorbidity and body mass index (BMI) of 36.0 to 36.9 in adult ([address] 11/17/2017    E. coli septicemia [address] [address]     E. 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 coli ([address]     Due to UTI     Simple chronic bronchitis ([address] 4/10/2018    Slow transit constipation 11/3/2016    Unspecified sleep apnea     UTI (urinary tract infection)        Past Surgical  History:     Past Surgical History:   Procedure Laterality Date    CARPAL TUNNEL RELEASE      bilateral    COLONOSCOPY      CYSTOSCOPY  01/02/2019    by Dr. Safia Randhawa N/A 1/2/2019    CYSTOSCOPY performed by Herve Aviles MD at [address]    first knuckle right index finger    GASTRIC BYPASS SURGERY  1985    vertical banded gastroplasty   [address] REPLACEMENT  2004 & 2005    bilateral knees    NM EGD FLEXIBLE FOREIGN BODY REMOVAL N/A 8/16/2018    EGD FOREIGN BODY REMOVAL performed by Jon Duvall MD at [address]  EGD TRANSORAL BIOPSY SINGLE/MULTIPLE N/A 5/25/2018    EGD BIOPSY performed by Bartolome Graham DO at [address] [facility]      left shoulder    UPPER GASTROINTESTINAL ENDOSCOPY  08/13/2018    removal of food bolus    UPPER GASTROINTESTINAL ENDOSCOPY N/A 8/13/2018    EGD FOREIGN BODY REMOVAL performed by Bartolome Graham DO at [address] [place] [address] N/A 8/13/2018    EGD BIOPSY performed by Bartolome Graham DO at [address] [place] [address]  08/16/2018    egd with balloon dilitation    UPPER GASTROINTESTINAL ENDOSCOPY  8/16/2018    EGD DILATION BALLOON performed by Jon Duvall MD at [address] [place] [address] N/A 10/1/2018    EGD BIOPSY performed by Dung Schroeder MD at [address] [place] [address]  10/1/2018    EGD DILATION BALLOON performed by Dung Schroeder MD at [address] [place] [address] N/A 11/19/2018    EGD DILATION BALLOON performed by Dung Schroeder MD at [facility]       Medications:      rivaroxaban  20 mg Oral Daily with breakfast    pantoprazole  40 mg Oral Daily    bumetanide  1 mg Oral Daily    cetirizine  10 mg Oral Daily    fluticasone  2 spray Nasal Daily    budesonide-formoterol  2 puff Inhalation BID    gabapentin  300 mg Oral TID    guaiFENesin  600 mg Oral BID    potassium chloride  20 mEq Oral BID    meropenem  1 g Intravenous Q8H    ciprofloxacin  750 mg Oral 2 times per day    sodium chloride flush  10 mL Intravenous 2 times per day    ipratropium-albuterol  1 ampule Inhalation Q4H WA       Social History:     Social History     Socioeconomic History    Marital status:      Spouse name: Not on file    Number of children: Not on file    Years of education: Not on file    Highest education level: Not on file   Occupational dysuria. No hematuria. No suprapubic or CVA pain  Musculoskeletal: No muscle aches or pains. No joint effusions, swelling or deformities. Bilateral leg edema  Hematologic: No bleeding or bruising. Neurologic: No headache, weakness, numbness, or tingling. Integument: No rash, no ulcers. Psychiatric: No depression. Endocrine: No polyuria, no polydipsia, no polyphagia. Physical Examination :     Patient Vitals for the past 8 hrs:   BP Temp Temp src Pulse Resp SpO2 Weight   06/30/20 0843 -- -- -- -- 18 98 % --   06/30/20 0815 112/62 98.3 °F (36.8 °C) Oral 87 20 100 % --   06/30/20 8807 -- -- -- -- -- -- 220 lb 12.8 oz (100.2 kg)   06/30/20 0445 -- -- -- -- 16 -- --     General Appearance: Awake, alert, and in no apparent distress  Head:  Normocephalic, no trauma  Eyes: Pupils equal, round, reactive to light and accommodation; extraocular movements intact; sclera anicteric; conjunctivae pink. No embolic phenomena. ENT: Oropharynx clear, without erythema, exudate, or thrush. No tenderness of sinuses. Mouth/throat: mucosa pink and moist. No lesions. Dentition in good repair. Neck:Supple, without lymphadenopathy. Thyroid normal, No bruits. Pulmonary/Chest: Clear to auscultation, without wheezes, rales, or rhonchi. No dullness to percussion. Cardiovascular: Regular rate and rhythm without murmurs, rubs, or gallops. Abdomen: Soft, non tender. Bowel sounds normal. No organomegaly  All four Extremities: No cyanosis, clubbing, edema, or effusions. Neurologic: No gross sensory or motor deficits. Skin: Warm and dry with good turgor. No signs of peripheral arterial or venous insufficiency. No ulcerations. No open wounds.     Medical Decision Making -Laboratory:   I have independently reviewed/ordered the following labs:    CBC with Differential:   Recent Labs     06/29/20  0536 06/30/20  0538   WBC 5.8 4.2   HGB 8.1* 7.9*   HCT 27.7* 27.1*    170   LYMPHOPCT  --  16*   MONOPCT  --  16*     BMP:   Recent Labs trace right-sided effusion. Ricka Backers is no pneumothorax.  The   tracheobronchial tree is patent.       Upper Abdomen:  There is cholelithiasis.       Soft Tissues/Bones: No acute bone or soft tissue abnormality. Ricka Backers is a   right thyroid lobe nodule measuring 2.4 x 1.6 cm.           Impression   No acute or chronic pulmonary embolism.       Nodular areas of consolidation in the right lower lobe consistent with   pneumonia.  Trace right-sided effusion.           Medical Decision Oeyofy-Losspwcq-Hceth:     Component Name Value Ref Range   Gram Stain Result 0 to 1 WHITE BLOOD CELLS/LPF     Gram Stain Result 0 to 1 SQUAMOUS EPITHELIAL CELLS/LPF     Gram Stain Result 0 CILIATED EPITHELIAL CELLS/LPF     Gram Stain Result MANY GRAM POSITIVE COCCI     Gram Stain Result MANY YEAST     Culture MODERATE ENTEROBACTER CLOACAE COMPLEX (A)     Culture MODERATE LYUBOV ALBICANS (A)     Specimen Collected on   Sputum - Sputum 6/17/2020 3:15 PM     Organism Antibiotic Method Susceptibility   Enterobacter cloacae complex Amikacin KAITY METHOD <=2: Susceptible    Cefazolin KAITY METHOD >=64: Resistant    Cefepime KAITY METHOD 2: Susceptible    Cefotaxime KAITY METHOD >=64: Resistant    Ceftriaxone KAITY METHOD >=64: Resistant    Cefuroxime KAITY METHOD >=64: Resistant    Ciprofloxacin KAITY METHOD <=0.25: Susceptible    Gentamicin KAITY METHOD <=1: Susceptible    IMIPENEM KAITY METHOD <=0.25: Susceptible    Levofloxacin KAITY METHOD <=0.12: Susceptible    Meropenem KAITY METHOD <=0.25: Susceptible    PIPERACIL/TAZOBACTAM KAITY METHOD >=128: Resistant    Tobramycin KAITY METHOD <=1: Susceptible    TRIMETH/SULFAMETHOXAZOLE KAITY METHOD <=1/19: Susceptible    Ertapenem KAITY METHOD 4: Resistant    MULTIDRUG RESISTANCE/MDRO KAITY METHOD POSITIVE    Comment:   NON CARABPENEM PRODUCING  CARBAPENEM RESISTANT  ENTEROBACTERIACAE  NO CARBAPENEMASE DETECTED       Medical Decision Making-Other:     Note:  · Labs, medications, radiologic studies were reviewed with personal review of films  · Large amounts of data were reviewed  · Discussed with nursing Staff, Discharge planner  · Infection Control and Prevention measures reviewed  · All prior entries were reviewed  · Administer medications as ordered  · Prognosis: Guarded  · Discharge planning reviewed  · Follow up as outpatient. Thank you for allowing us to participate in the care of this patient. Please call with questions. Seb Polanco MD  Internal Medicine Resident, PGY-1  [address] [place], [place]  6/30/2020, 11:51 AM     ATTESTATION:    I have discussed the case, including pertinent history and exam findings with the residents and students. I have seen and examined the patient and the key elements of the encounter have been performed by me. I was present when the student obtained his information or examined the patient. I have reviewed the laboratory data, other diagnostic studies and discussed them with the residents. I have updated the medical record where necessary. I agree with the assessment, plan and orders as documented by the resident/ student.     Magalie Engle MD.

## 2020-06-30 NOTE — PROGRESS NOTES
Physical Therapy  Facility/Department: [facility] ONC/MED SURG  Daily Treatment Note  NAME: Glenn Patrick  : 1945  MRN: [id number]    Date of Service: 2020    Discharge Recommendations:  Patient would benefit from continued therapy after discharge        Assessment   Body structures, Functions, Activity limitations: Decreased functional mobility ; Decreased strength;Decreased balance;Decreased endurance  Assessment: Slight increase in gait distance. Moderate shortness of breath. Pt would benefit from continued acute PT to address deficits. PT Education: Plan of Care;PT Role;General Safety;Gait Training;Home Exercise Program;Equipment;Transfer Training  REQUIRES PT FOLLOW UP: Yes  Activity Tolerance  Activity Tolerance: Patient Tolerated treatment well;Patient limited by fatigue;Patient limited by endurance     Patient Diagnosis(es): The encounter diagnosis was Pneumonia due to organism. has a past medical history of Acute superficial gastritis without hemorrhage, Anastomotic stricture of stomach, Asthma, Atrial fibrillation ([address] Calculus of gallbladder without cholecystitis without obstruction, Chronic diastolic heart failure (HCC), Chronic rhinosinusitis, Class 2 severe obesity due to excess calories with serious comorbidity and body mass index (BMI) of 36.0 to 36.9 in adult ([address] E. coli septicemia ([address] E. coli UTI, Foot drop, right, Fracture of femur ([address] Gait disorder, Gastric out let obstruction, GERD (gastroesophageal reflux disease), Hallux valgus, acquired, bilateral, Hyperlipidemia, Hypertension, Impaired hearing, Internal hemorrhoids, Lymphedema of both lower extremities, Nausea & vomiting, OA (osteoarthritis) of knee, bilateral, Obesity, MARIAMA on CPAP, Osteoarthritis, Osteoarthritis of lumbar spine, S/P revision of total knee, Septicemia due to E. coli ([address] Simple chronic bronchitis ([address] Slow transit constipation, Unspecified sleep apnea, and UTI (urinary tract infection).    has goal 3: ascend/descend 4 steps with SBA  Short term goal 4: exercise program x SBA  Patient Goals   Patient goals : Return home    Plan    Plan  Times per week: 5-6x wk  Current Treatment Recommendations: Strengthening, Endurance Training, Gait Training, Functional Mobility Training, Stair training, Safety Education & Training  Safety Devices  Type of devices: Call light within reach, Nurse notified, Gait belt, Patient at risk for falls, Left in chair, All fall risk precautions in place  Restraints  Initially in place: No     Therapy Time   Individual Concurrent Group Co-treatment   Time In 1315         Time Out 1345         Minutes 30         Timed Code Treatment Minutes: 0182 [facility], PT

## 2020-07-01 PROBLEM — E87.70 FLUID OVERLOAD: Status: ACTIVE | Noted: 2020-07-01

## 2020-07-01 PROBLEM — I50.33 ACUTE ON CHRONIC DIASTOLIC HEART FAILURE (HCC): Status: ACTIVE | Noted: 2017-11-17

## 2020-07-01 LAB
ABSOLUTE EOS #: 0.29 K/UL (ref 0–0.44)
ABSOLUTE IMMATURE GRANULOCYTE: 0.05 K/UL (ref 0–0.3)
ABSOLUTE LYMPH #: 0.82 K/UL (ref 1.1–3.7)
ABSOLUTE MONO #: 0.82 K/UL (ref 0.1–1.2)
ANION GAP SERPL CALCULATED.3IONS-SCNC: 10 MMOL/L (ref 9–17)
BASOPHILS # BLD: 1 % (ref 0–2)
BASOPHILS ABSOLUTE: 0.05 K/UL (ref 0–0.2)
BUN BLDV-MCNC: 10 MG/DL (ref 8–23)
BUN/CREAT BLD: ABNORMAL (ref 9–20)
CALCIUM SERPL-MCNC: 8.4 MG/DL (ref 8.6–10.4)
CHLORIDE BLD-SCNC: 100 MMOL/L (ref 98–107)
CO2: 27 MMOL/L (ref 20–31)
CREAT SERPL-MCNC: 0.96 MG/DL (ref 0.7–1.2)
DIFFERENTIAL TYPE: ABNORMAL
EOSINOPHILS RELATIVE PERCENT: 6 % (ref 1–4)
GFR AFRICAN AMERICAN: >60 ML/MIN
GFR NON-AFRICAN AMERICAN: >60 ML/MIN
GFR SERPL CREATININE-BSD FRML MDRD: ABNORMAL ML/MIN/{1.73_M2}
GFR SERPL CREATININE-BSD FRML MDRD: ABNORMAL ML/MIN/{1.73_M2}
GLUCOSE BLD-MCNC: 95 MG/DL (ref 70–99)
HCT VFR BLD CALC: 28.3 % (ref 40.7–50.3)
HEMOGLOBIN: 8.2 G/DL (ref 13–17)
IMMATURE GRANULOCYTES: 1 %
LYMPHOCYTES # BLD: 17 % (ref 24–43)
MCH RBC QN AUTO: 27.2 PG (ref 25.2–33.5)
MCHC RBC AUTO-ENTMCNC: 29 G/DL (ref 28.4–34.8)
MCV RBC AUTO: 94 FL (ref 82.6–102.9)
MONOCYTES # BLD: 17 % (ref 3–12)
MORPHOLOGY: ABNORMAL
NRBC AUTOMATED: 0 PER 100 WBC
PDW BLD-RTO: 24.9 % (ref 11.8–14.4)
PLATELET # BLD: 171 K/UL (ref 138–453)
PLATELET ESTIMATE: ABNORMAL
PMV BLD AUTO: 9.1 FL (ref 8.1–13.5)
POTASSIUM SERPL-SCNC: 4.1 MMOL/L (ref 3.7–5.3)
RBC # BLD: 3.01 M/UL (ref 4.21–5.77)
RBC # BLD: ABNORMAL 10*6/UL
SEG NEUTROPHILS: 58 % (ref 36–65)
SEGMENTED NEUTROPHILS ABSOLUTE COUNT: 2.77 K/UL (ref 1.5–8.1)
SODIUM BLD-SCNC: 137 MMOL/L (ref 135–144)
WBC # BLD: 4.8 K/UL (ref 3.5–11.3)
WBC # BLD: ABNORMAL 10*3/UL

## 2020-07-01 PROCEDURE — 80048 BASIC METABOLIC PNL TOTAL CA: CPT

## 2020-07-01 PROCEDURE — 97110 THERAPEUTIC EXERCISES: CPT

## 2020-07-01 PROCEDURE — 94664 DEMO&/EVAL PT USE INHALER: CPT

## 2020-07-01 PROCEDURE — 6370000000 HC RX 637 (ALT 250 FOR IP): Performed by: INTERNAL MEDICINE

## 2020-07-01 PROCEDURE — 94760 N-INVAS EAR/PLS OXIMETRY 1: CPT

## 2020-07-01 PROCEDURE — 97116 GAIT TRAINING THERAPY: CPT

## 2020-07-01 PROCEDURE — 99233 SBSQ HOSP IP/OBS HIGH 50: CPT | Performed by: HOSPITALIST

## 2020-07-01 PROCEDURE — 1200000000 HC SEMI PRIVATE

## 2020-07-01 PROCEDURE — 2580000003 HC RX 258: Performed by: INTERNAL MEDICINE

## 2020-07-01 PROCEDURE — 94660 CPAP INITIATION&MGMT: CPT

## 2020-07-01 PROCEDURE — 6370000000 HC RX 637 (ALT 250 FOR IP): Performed by: NURSE PRACTITIONER

## 2020-07-01 PROCEDURE — 2500000003 HC RX 250 WO HCPCS: Performed by: HOSPITALIST

## 2020-07-01 PROCEDURE — 94669 MECHANICAL CHEST WALL OSCILL: CPT

## 2020-07-01 PROCEDURE — 6360000002 HC RX W HCPCS: Performed by: INTERNAL MEDICINE

## 2020-07-01 PROCEDURE — 85025 COMPLETE CBC W/AUTO DIFF WBC: CPT

## 2020-07-01 PROCEDURE — 99232 SBSQ HOSP IP/OBS MODERATE 35: CPT | Performed by: INTERNAL MEDICINE

## 2020-07-01 PROCEDURE — 97535 SELF CARE MNGMENT TRAINING: CPT

## 2020-07-01 PROCEDURE — 97530 THERAPEUTIC ACTIVITIES: CPT

## 2020-07-01 PROCEDURE — 36415 COLL VENOUS BLD VENIPUNCTURE: CPT

## 2020-07-01 PROCEDURE — 94640 AIRWAY INHALATION TREATMENT: CPT

## 2020-07-01 RX ORDER — BUMETANIDE 0.25 MG/ML
1 INJECTION, SOLUTION INTRAMUSCULAR; INTRAVENOUS 2 TIMES DAILY
Status: DISCONTINUED | OUTPATIENT
Start: 2020-07-01 | End: 2020-07-03

## 2020-07-01 RX ORDER — DIPHENHYDRAMINE HCL 25 MG
25 TABLET ORAL NIGHTLY PRN
Status: DISCONTINUED | OUTPATIENT
Start: 2020-07-01 | End: 2020-07-03 | Stop reason: HOSPADM

## 2020-07-01 RX ADMIN — GABAPENTIN 300 MG: 300 CAPSULE ORAL at 08:45

## 2020-07-01 RX ADMIN — MEROPENEM 1 G: 1 INJECTION, POWDER, FOR SOLUTION INTRAVENOUS at 10:42

## 2020-07-01 RX ADMIN — POTASSIUM CHLORIDE 20 MEQ: 1500 TABLET, EXTENDED RELEASE ORAL at 08:13

## 2020-07-01 RX ADMIN — IPRATROPIUM BROMIDE AND ALBUTEROL SULFATE 1 AMPULE: .5; 3 SOLUTION RESPIRATORY (INHALATION) at 12:55

## 2020-07-01 RX ADMIN — RIVAROXABAN 20 MG: 20 TABLET, FILM COATED ORAL at 08:04

## 2020-07-01 RX ADMIN — BUDESONIDE AND FORMOTEROL FUMARATE DIHYDRATE 2 PUFF: 160; 4.5 AEROSOL RESPIRATORY (INHALATION) at 20:47

## 2020-07-01 RX ADMIN — GUAIFENESIN 600 MG: 600 TABLET, EXTENDED RELEASE ORAL at 08:13

## 2020-07-01 RX ADMIN — POTASSIUM CHLORIDE 20 MEQ: 1500 TABLET, EXTENDED RELEASE ORAL at 20:36

## 2020-07-01 RX ADMIN — GABAPENTIN 300 MG: 300 CAPSULE ORAL at 14:49

## 2020-07-01 RX ADMIN — BUMETANIDE 1 MG: 0.25 INJECTION INTRAMUSCULAR; INTRAVENOUS at 16:44

## 2020-07-01 RX ADMIN — MEROPENEM 1 G: 1 INJECTION, POWDER, FOR SOLUTION INTRAVENOUS at 01:38

## 2020-07-01 RX ADMIN — CETIRIZINE HYDROCHLORIDE 10 MG: 10 TABLET ORAL at 08:13

## 2020-07-01 RX ADMIN — PANTOPRAZOLE SODIUM 40 MG: 40 TABLET, DELAYED RELEASE ORAL at 08:13

## 2020-07-01 RX ADMIN — CIPROFLOXACIN 750 MG: 750 TABLET, FILM COATED ORAL at 08:45

## 2020-07-01 RX ADMIN — BUMETANIDE 1 MG: 1 TABLET ORAL at 08:13

## 2020-07-01 RX ADMIN — MEROPENEM 1 G: 1 INJECTION, POWDER, FOR SOLUTION INTRAVENOUS at 18:18

## 2020-07-01 RX ADMIN — GUAIFENESIN 600 MG: 600 TABLET, EXTENDED RELEASE ORAL at 20:36

## 2020-07-01 RX ADMIN — CIPROFLOXACIN 750 MG: 750 TABLET, FILM COATED ORAL at 20:36

## 2020-07-01 RX ADMIN — GABAPENTIN 300 MG: 300 CAPSULE ORAL at 20:36

## 2020-07-01 RX ADMIN — IPRATROPIUM BROMIDE AND ALBUTEROL SULFATE 1 AMPULE: .5; 3 SOLUTION RESPIRATORY (INHALATION) at 20:47

## 2020-07-01 RX ADMIN — DIPHENHYDRAMINE HCL 25 MG: 25 TABLET ORAL at 23:08

## 2020-07-01 ASSESSMENT — PAIN SCALES - GENERAL
PAINLEVEL_OUTOF10: 3
PAINLEVEL_OUTOF10: 0

## 2020-07-01 ASSESSMENT — PAIN DESCRIPTION - LOCATION: LOCATION: LEG

## 2020-07-01 ASSESSMENT — PAIN DESCRIPTION - ORIENTATION: ORIENTATION: RIGHT;LEFT

## 2020-07-01 ASSESSMENT — PAIN DESCRIPTION - DESCRIPTORS: DESCRIPTORS: ACHING;THROBBING

## 2020-07-01 ASSESSMENT — PAIN DESCRIPTION - PAIN TYPE: TYPE: CHRONIC PAIN

## 2020-07-01 ASSESSMENT — PAIN DESCRIPTION - FREQUENCY: FREQUENCY: CONTINUOUS

## 2020-07-01 NOTE — PLAN OF CARE
BRONCHOSPASM/BRONCHOCONSTRICTION     [x]         IMPROVE AERATION/BREATH SOUNDS  [x]   ADMINISTER BRONCHODILATOR THERAPY AS APPROPRIATE  [x]   ASSESS BREATH SOUNDS  []   IMPLEMENT AEROSOL/MDI PROTOCOL  [x]   PATIENT EDUCATION AS NEEDED     NONINVASIVE VENTILATION    PROVIDE OPTIMAL VENTILATION/ACCEPTABLE SPO2   IMPLEMENT NONINVASIVE VENTILATION PROTOCOL   MAINTAIN ACCEPTABLE SPO2   ASSESS SKIN INTEGRITY/BREAKDOWN SCORE   PATIENT EDUCATION AS NEEDED   BIPAP AS NEEDED

## 2020-07-01 NOTE — PROGRESS NOTES
Physical Therapy  Facility/Department: [facility] ONC/MED SURG  Daily Treatment Note  NAME: Steve Stack  : 1945  MRN: [id number]    Date of Service: 2020    Discharge Recommendations:  Patient would benefit from continued therapy after discharge      Assessment   Body structures, Functions, Activity limitations: Decreased functional mobility ; Decreased endurance;Decreased strength;Decreased balance  Assessment: Pt progressing with mobility. Decreased endurance noted but pt demos good safety awareness with mobility. Would benefit from continued PT at home to maximize functional outcomes. Prognosis: Good  PT Education: Transfer Training;General Safety;Home Exercise Program;Gait Training  Patient Education: pt given orange tband for home use  REQUIRES PT FOLLOW UP: Yes  Activity Tolerance  Activity Tolerance: Patient limited by endurance; Patient Tolerated treatment well     Patient Diagnosis(es): The encounter diagnosis was Pneumonia due to organism.      has a past medical history of Acute superficial gastritis without hemorrhage, Anastomotic stricture of stomach, Asthma, Atrial fibrillation ([address] Calculus of gallbladder without cholecystitis without obstruction, Chronic diastolic heart failure (HCC), Chronic rhinosinusitis, Class 2 severe obesity due to excess calories with serious comorbidity and body mass index (BMI) of 36.0 to 36.9 in adult ([address] E. coli septicemia ([address] E. coli UTI, Foot drop, right, Fracture of femur ([address] Gait disorder, Gastric out let obstruction, GERD (gastroesophageal reflux disease), Hallux valgus, acquired, bilateral, Hyperlipidemia, Hypertension, Impaired hearing, Internal hemorrhoids, Lymphedema of both lower extremities, Nausea & vomiting, OA (osteoarthritis) of knee, bilateral, Obesity, MARIAMA on CPAP, Osteoarthritis, Osteoarthritis of lumbar spine, S/P revision of total knee, Septicemia due to E. coli ([address] Simple chronic bronchitis ([address] Slow transit constipation, Unspecified sleep apnea, and UTI (urinary tract infection). has a past surgical history that includes Finger amputation (1966); Gastric bypass surgery (1985); Carpal tunnel release; Colonoscopy; Rotator cuff repair; joint replacement (2004 & 2005); Hemorrhoid surgery; pr egd transoral biopsy single/multiple (N/A, 5/25/2018); Upper gastrointestinal endoscopy (08/13/2018); Upper gastrointestinal endoscopy (N/A, 8/13/2018); Upper gastrointestinal endoscopy (N/A, 8/13/2018); Upper gastrointestinal endoscopy (08/16/2018); pr egd flexible foreign body removal (N/A, 8/16/2018); Upper gastrointestinal endoscopy (8/16/2018); Upper gastrointestinal endoscopy (N/A, 10/1/2018); Upper gastrointestinal endoscopy (10/1/2018); Upper gastrointestinal endoscopy (N/A, 11/19/2018); Cystoscopy (01/02/2019); and Cystoscopy (N/A, 1/2/2019). Restrictions  Restrictions/Precautions  Restrictions/Precautions: Up as Tolerated  Required Braces or Orthoses?: Yes( B AFOs)  Required Braces or Orthoses  Right Lower Extremity Brace: Ankle Foot Orthotics  Left Lower Extremity Brace: Erika Foot Orthotics  Subjective   General  Chart Reviewed: Yes  Response To Previous Treatment: Patient with no complaints from previous session. Family / Caregiver Present: No  Subjective  Subjective: Pt in bed; eager to ambulate. Offers no c/o pain  General Comment  Comments: B AFO's donned prior to session. Pt left in chair; call light in reach and BLEs elevated.   Pain Screening  Patient Currently in Pain: Denies  Vital Signs  Patient Currently in Pain: Denies       Orientation  Orientation  Overall Orientation Status: Within Normal Limits  Cognition      Objective   Bed mobility  Supine to Sit: Modified independent  Scooting: Modified independent  Transfers  Sit to Stand: Stand by assistance(demos good safety awareness)  Stand to sit: Stand by assistance  Bed to Chair: Stand by assistance  Ambulation  Ambulation?: Yes  Ambulation 1  Surface: level tile  Device:

## 2020-07-01 NOTE — PLAN OF CARE
BRONCHOSPASM/BRONCHOCONSTRICTION     [x]         IMPROVE AERATION/BREATH SOUNDS  [x]   ADMINISTER BRONCHODILATOR THERAPY AS APPROPRIATE  [x]   ASSESS BREATH SOUNDS  []   IMPLEMENT AEROSOL/MDI PROTOCOL  [x]   PATIENT EDUCATION AS NEEDED     NON INVASIVE VENTILATION  PROVIDE OPTIMAL VENTILATION/ACCEPTABLE SP02  IMPLEMENT NON INVASIVE VENTILATION PROTOCOL  ASSESSMENT SKIN INTEGRITY  PATIENT EDUCATION AS NEEDED  BIPAP AS NEEDED-Pt wears comfortably

## 2020-07-01 NOTE — CARE COORDINATION
TRANSITIONAL CARE PLANNING/ [facility]:     Reason for Admission: Hospital-acquired bacterial pneumonia [J15.9]  Hospital-acquired bacterial pneumonia [J15.9]     Treatment Plan of Care:     Tests/Procedures still needed:     Barriers to Discharge:     Readmission Risk              Risk of Unplanned Readmission:        12            Patient goals/Treatment Preferences/Transitional Plan: Patient may need IV antibiotics through 7/7. Notified Paz at [employer]. She is going to review and inform writer of insurance approval.    Also, patient requesting the same people that came to his house before, if he can.   Referrals Made:     Follow Up needed:

## 2020-07-01 NOTE — PROGRESS NOTES
Occupational Therapy  Facility/Department: [facility]  Daily Treatment Note  NAME: Laury Soto  : 1945  MRN: [id number]    Date of Service: 2020    Discharge Recommendations:  Patient would benefit from continued therapy after discharge    Assessment   Performance deficits / Impairments: Decreased functional mobility ; Decreased strength;Decreased endurance;Decreased ADL status; Decreased safe awareness;Decreased high-level IADLs  Assessment:  Pt would benefit from continued OT services during acute hospitilization to increase independence in ADL/functional mobility tasks. Pt reports having good family support at home to assist PRN. Prognosis: Good  Decision Making: Medium Complexity  OT Education: OT Role;Energy Conservation;Transfer Training  Patient Education: purpose of OT; proper hand and foot placement; deep breathing; pacing, good return   REQUIRES OT FOLLOW UP: Yes  Activity Tolerance  Activity Tolerance: Patient Tolerated treatment well  Safety Devices  Safety Devices in place: Yes  Type of devices: Nurse notified;Gait belt;Patient at risk for falls;Call light within reach; Left in chair  Restraints  Initially in place: No       Patient Diagnosis(es): The encounter diagnosis was Pneumonia due to organism.       has a past medical history of Acute superficial gastritis without hemorrhage, Anastomotic stricture of stomach, Asthma, Atrial fibrillation ([facility] [address] Calculus of gallbladder without cholecystitis without obstruction, Chronic diastolic heart failure (HCC), Chronic rhinosinusitis, Class 2 severe obesity due to excess calories with serious comorbidity and body mass index (BMI) of 36.0 to 36.9 in [facility]), E. coli septicemia ([facility] [address] E. coli UTI, Foot drop, right, Fracture of femur ([facility] [address] Gait disorder, Gastric out let obstruction, GERD (gastroesophageal reflux disease), Hallux valgus, acquired, bilateral, Hyperlipidemia, Hypertension, Impaired hearing, Internal hemorrhoids, Lymphedema of both lower extremities, Nausea & vomiting, OA (osteoarthritis) of knee, bilateral, Obesity, MARIAMA on CPAP, Osteoarthritis, Osteoarthritis of lumbar spine, S/P revision of total knee, Septicemia due to E. coli (HCC), Simple chronic bronchitis (HCC), Slow transit constipation, Unspecified sleep apnea, and UTI (urinary tract infection). has a past surgical history that includes Finger amputation (1966); Gastric bypass surgery (1985); Carpal tunnel release; Colonoscopy; Rotator cuff repair; joint replacement (2004 & 2005); Hemorrhoid surgery; pr egd transoral biopsy single/multiple (N/A, 5/25/2018); Upper gastrointestinal endoscopy (08/13/2018); Upper gastrointestinal endoscopy (N/A, 8/13/2018); Upper gastrointestinal endoscopy (N/A, 8/13/2018); Upper gastrointestinal endoscopy (08/16/2018); pr egd flexible foreign body removal (N/A, 8/16/2018); Upper gastrointestinal endoscopy (8/16/2018); Upper gastrointestinal endoscopy (N/A, 10/1/2018); Upper gastrointestinal endoscopy (10/1/2018); Upper gastrointestinal endoscopy (N/A, 11/19/2018); Cystoscopy (01/02/2019); and Cystoscopy (N/A, 1/2/2019). Restrictions  Restrictions/Precautions  Restrictions/Precautions: Up as Tolerated, General Precautions, Fall Risk  Required Braces or Orthoses?: Yes  Required Braces or Orthoses  Right Lower Extremity Brace: Ankle Foot Orthotics  Left Lower Extremity Brace: Erika Foot Orthotics     Subjective   General  Chart Reviewed: Orders, Progress Notes, History and Physical, Labs, Imaging  Patient assessed for rehabilitation services?: Yes  Family / Caregiver Present: Yes  General Comment  Comments: Rn ok'd for therapy visit this date. Pt agreeable to session, pleasent/cooperative throughout. Vital Signs  Patient Currently in Pain: Denies     Orientation  Orientation  Overall Orientation Status: Within Functional Limits     Objective    ADL  Grooming: Stand by assistance;Setup; Increased time to complete(to wash face seated in recliner )  UE Bathing: Setup; Increased time to complete;Minimal assistance(to wash back, pt able to complete BUE and chest seated in recliner )  LE Bathing: Minimal assistance; Increased time to complete(to wash feet, pt able to complete BLE to ankle seated in recliner )  UE Dressing: Increased time to complete;Contact guard assistance(to don/ doff gown seated in recliner )  LE Dressing: Increased time to complete; Moderate assistance;Maximum assistance(pt required max A for donning compression hose and socks, mod A for donning shoes seated in recliner. )  Toileting: Contact guard assistance(to complete pericare, brief in place )  Additional Comments: Pt up in recliner on arrival. Pt assisted to complete ADL activities as documented above. Pt required increased time and effort for activity. Pt assisted to complete sit > stand transfer and static standing to remove wet gown and don brief. Pt demo no LOB with static standing. Pt took 2 small steps back to chair for repositioning.  Pt remianed in chair, call light in reach and RN notified on therapist exit      Balance  Sitting Balance: Stand by assistance(seated in recliner )  Standing Balance: Contact guard assistance  Standing Balance  Time: pt tolerated approx 4-5 min  Activity: static standing at chairside   Comment: brief change     Bed mobility  Comment: Pt seated in chair on arrival, returned to chair on exit   Transfers  Stand Step Transfers: Contact guard assistance  Sit to stand: Contact guard assistance  Stand to sit: Minimal assistance(to control descent )     Cognition  Overall Cognitive Status: WFL        Perception  Overall Perceptual Status: [facility]     Plan   Plan  Times per week: 4-5x/week   Current Treatment Recommendations: Endurance Training, Equipment Evaluation, Education, & procurement, Self-Care / ADL, Functional Mobility Training, Safety Education & Training, Balance Training, Patient/Caregiver Education & Training    Goals  Short term goals  Time Frame for Short term goals: By discharge:  Short term goal 1: Pt will complete functional transfers/mobility with Mod I, use of AD PRN  Short term goal 2: Pt will complete ADL tasks with Mod I, use of AE/AD PRN  Short term goal 3: Pt will demonstrate ~15 minutes of dynamic standing tolerance during functional task  Short term goal 4: Pt will demonstrate good safety awareness duing all functional mobility/ADL tasks        Therapy Time   Individual Concurrent Group Co-treatment   Time In 1107         Time Out 1145         Minutes 38         Timed Code Treatment Minutes: 38 Minutes   See above for LOF. RN reports patient is medically stable for therapy treatment this date. Chart reviewed prior to treatment and patient is agreeable for therapy. All lines intact and patient positioned comfortably at end of treatment. All patient needs addressed prior to ending therapy session.       Cristela Pickett OTR/L

## 2020-07-01 NOTE — PLAN OF CARE
Problem: Falls - Risk of:  Goal: Will remain free from falls  Description: Will remain free from falls  Outcome: Ongoing  Goal: Absence of physical injury  Description: Absence of physical injury  Outcome: Ongoing     Problem: Skin Integrity:  Goal: Will show no infection signs and symptoms  Description: Will show no infection signs and symptoms  Outcome: Ongoing  Goal: Absence of new skin breakdown  Description: Absence of new skin breakdown  Outcome: Ongoing     Problem: Pain:  Goal: Pain level will decrease  Description: Pain level will decrease  Outcome: Ongoing  Goal: Control of acute pain  Description: Control of acute pain  Outcome: Ongoing

## 2020-07-01 NOTE — PROGRESS NOTES
[facility]    Progress Note    7/1/2020    3:25 PM    Name:   Vila Opitz  MRN:     [id number]     Acct:      [de-identified]   Room:   [address] [place] [address] Day:  4  Admit Date:  6/27/2020 11:19 AM    PCP:   Keyona Vo PA-C  Code Status:  Full Code    Subjective:   Patient seen and examined. Patient reports that he does get winded when he is walking around on the floor. Patient states that prior to this hospitalization, he was at [facility].  Patient reports that he was there for approximately 12 days and believe that he received fluid for much of that time. Patient states that his lower extremity is more swollen than normal.    Review of Systems:     Constitutional:  negative for chills, fevers, sweats  Respiratory: Positive for cough, dyspnea on exertion, shortness of breath, negative for wheezing  Cardiovascular: Positive for lower extremity edema negative for chest pain, chest pressure/discomfort, palpitations  Gastrointestinal:  negative for abdominal pain, constipation, diarrhea, nausea, vomiting  Neurological:  negative for dizziness, headache    Medications: Allergies:     Allergies   Allergen Reactions    Darvocet [Propoxyphene N-Acetaminophen] Hives    Other Hives    Oxycodone-Acetaminophen        Current Meds:   Scheduled Meds:    bumetanide  1 mg Intravenous BID    rivaroxaban  20 mg Oral Daily with breakfast    pantoprazole  40 mg Oral Daily    cetirizine  10 mg Oral Daily    fluticasone  2 spray Nasal Daily    budesonide-formoterol  2 puff Inhalation BID    gabapentin  300 mg Oral TID    guaiFENesin  600 mg Oral BID    potassium chloride  20 mEq Oral BID    meropenem  1 g Intravenous Q8H    ciprofloxacin  750 mg Oral 2 times per day    sodium chloride flush  10 mL Intravenous 2 times per day    ipratropium-albuterol  1 ampule Inhalation Q4H WA     Continuous Infusions:    sodium chloride 15 mL/hr at 20 1401     PRN Meds: ibuprofen, albuterol, sodium chloride flush, magnesium hydroxide, promethazine **OR** ondansetron, nicotine, albuterol    Data:     Past Medical History:   has a past medical history of Acute superficial gastritis without hemorrhage, Anastomotic stricture of stomach, Asthma, Atrial fibrillation ([facility] [address] Calculus of gallbladder without cholecystitis without obstruction, Chronic diastolic heart failure ([facility] [address] Chronic rhinosinusitis, Class 2 severe obesity due to excess calories with serious comorbidity and body mass index (BMI) of 36.0 to 36.9 in adult ([facility] [address] [facility] E. coli UTI, Foot drop, right, Fracture of femur ([facility] [address] Gait disorder, Gastric out let obstruction, GERD (gastroesophageal reflux disease), Hallux valgus, acquired, bilateral, Hyperlipidemia, Hypertension, Impaired hearing, Internal hemorrhoids, Lymphedema of both lower extremities, Nausea & vomiting, OA (osteoarthritis) of knee, bilateral, Obesity, MARIAMA on CPAP, Osteoarthritis, Osteoarthritis of lumbar spine, S/P revision of total knee, Septicemia due to E. coli ([facility] [address] Simple chronic bronchitis ([facility] [address] Slow transit constipation, Unspecified sleep apnea, and UTI (urinary tract infection). Social History:   reports that he quit smoking about 43 years ago. He has a 20.00 pack-year smoking history. He has never used smokeless tobacco. He reports that he does not drink alcohol or use drugs. Family History:   Family History   Problem Relation Age of Onset   [facility] Cancer Mother     Heart Attack Father        Vitals:  /69   Pulse 64   Temp 97.8 °F (36.6 °C) (Oral)   Resp 18   Ht 5' 10\" (1.778 m)   Wt 223 lb 12.8 oz (101.5 kg)   SpO2 100%   BMI 32.11 kg/m²   Temp (24hrs), Av.3 °F (36.8 °C), Min:97.8 °F (36.6 °C), Max:98.8 °F (37.1 °C)    No results for input(s): POCGLU in the last 72 hours. I/O (24Hr):     Intake/Output Summary (Last 24 hours) at 2020 1525  Last data filed at 2020 1500  Gross per 24 hour   Intake 1127 ml   Output 1725 ml   Net -598 ml       Labs:  Hematology:  Recent Labs     06/29/20  0536 06/30/20  0538 07/01/20  0541   WBC 5.8 4.2 4.8   RBC 3.03* 2.95* 3.01*   HGB 8.1* 7.9* 8.2*   HCT 27.7* 27.1* 28.3*   MCV 91.4 91.9 94.0   MCH 26.7 26.8 27.2   MCHC 29.2 29.2 29.0   RDW 24.0* 24.3* 24.9*    170 171   MPV 8.6 8.7 9.1     Chemistry:  Recent Labs     06/30/20  0538 07/01/20  0541    137   K 4.0 4.1    100   CO2 24 27   GLUCOSE 95 95   BUN 10 10   CREATININE 0.63* 0.96   ANIONGAP 13 10   LABGLOM >60 >60   GFRAA >60 >60   CALCIUM 8.1* 8.4*   No results for input(s): PROT, LABALBU, LABA1C, H7FOFDW, U3GCJSZ, FT4, TSH, AST, ALT, LDH, GGT, ALKPHOS, LABGGT, BILITOT, BILIDIR, AMMONIA, AMYLASE, LIPASE, LACTATE, CHOL, HDL, LDLCHOLESTEROL, CHOLHDLRATIO, TRIG, VLDL, ENT65ML, PHENYTOIN, PHENYF, URICACID, POCGLU in the last 72 hours. ABG:No results found for: POCPH, PHART, PH, POCPCO2, UXX3MXM, PCO2, POCPO2, PO2ART, PO2, POCHCO3, DFR3YWO, HCO3, NBEA, PBEA, BEART, BE, THGBART, THB, XQQ2TJI, XYXD0KWN, F4EJLIWA, O2SAT, FIO2  Lab Results   Component Value Date/Time    SPECIAL NOT REPORTED 10/21/2019 01:25 PM     Lab Results   Component Value Date/Time    CULTURE NO GROWTH 10/21/2019 01:25 PM       Radiology:  Xr Chest Standard (2 Vw)    Result Date: 6/28/2020  1. Multifocal pneumonia primarily at the right lung base, increased from the prior study. Small bilateral pleural effusions. Follow-up is recommended to document resolution. 2. Stable moderate elevation of the right hemidiaphragm. Xr Chest Standard (2 Vw)    Result Date: 6/27/2020  Subtle right basilar infiltrate representing atelectasis versus pneumonia. Ct Chest Pulmonary Embolism W Contrast    Result Date: 6/27/2020  No acute or chronic pulmonary embolism. Nodular areas of consolidation in the right lower lobe consistent with pneumonia. Trace right-sided effusion.        Physical Examination: General appearance:  alert, cooperative and no distress  Mental Status:  oriented to person, place and time and normal affect  Lungs: Bibasilar crackles, no wheezes, no rhonchi  Heart:  regular rate and rhythm, no murmur  Abdomen:  soft, nontender, nondistended, normal bowel sounds, no masses, hepatomegaly, splenomegaly  Extremities: Bilateral 2+ pitting edema and lower extremity, redness, tenderness in the calves  Skin:  no gross lesions, rashes, induration    Assessment:        Hospital Problems           Last Modified POA    * (Principal) Hospital-acquired bacterial pneumonia 7/1/2020 Yes    Acute on chronic diastolic heart failure (HCC) 7/1/2020 Yes    Slow transit constipation 7/1/2020 Yes    Simple chronic bronchitis ([facility] [address] 7/1/2020 Yes    Atrial fibrillation ([facility] [address] 7/1/2020 Yes    Hyperlipidemia 7/1/2020 Yes    GERD (gastroesophageal reflux disease) 7/1/2020 Yes    Hypertension 7/1/2020 Yes    MARIAMA on CPAP 7/1/2020 Yes    Class 2 severe obesity due to excess calories with serious comorbidity and body mass index (BMI) of 36.0 to 36.9 in adult ([facility] [address] 7/1/2020 Yes    Fluid overload 7/1/2020 Yes          Plan:        1. Hospital-acquired bacterial pneumonia- patient presents with hemoptysis and cough. On 6/17/2020, patient had isolation of MDR Enterobacter from sputum. Patient currently on meropenem and Cipro. Infectious disease on board. Patient to be on meropenem until 7/7/2020. Patient to be on p.o. Cipro until 7/14/2020.  2. Acute on chronic diastolic heart failure- he does have a history of heart failure. Patient noted to have lower extremity swelling and bibasilar crackles. Patient will be placed on Bumex 1 mg IV twice daily. Patient's fluid status will be reevaluated. Daily weights, strict I's and O's.  3. Fluid overload-patient states that he was receiving IV fluids when he was hospitalized at [facility] prior to this admission. Patient states he was there for 12 days.   Patient most likely with fluid overload secondary to IV fluids given at that time. Patient given Bumex 1 mg IV twice a day to help mobilize fluid. 4. Atrial fibrillation-patient with history of atrial fibrillation. Patient will be continued on Xarelto. 5. Hyperlipidemia-patient with history of hyperlipidemia. Patient does not appear to be on any cholesterol-lowering medications. 6. GERD- patient with history of GERD. Patient be continued on Protonix. 7. Hypertension-patient with history of hypertension.   Patient to be continued on diuretic.  8. Obesity- BMI 32.2    Jocelyne Martel MD  7/1/2020  3:25 PM

## 2020-07-01 NOTE — PROGRESS NOTES
[facility] - Progress note  Today's Date and Time: 7/1/2020, 2:46 PM    Impression :   · Rt basilar bronchopneumonia with isolation of MDR Enterobacter from sputum at [facility] 6-17-20  · Shortness of breath  · Cough  · Hemoptysis  · COPD  · MARIAMA  · Diastolic CHF  · COVID tests;  · 6-14-20: Negative    Recommendations:     · Continue Cipro 750 mg po BID  · Continue meropenem 1 gm IV q 8 hr  · Monitor for clinical response  · Diuresis  · Fluid restriction    Medical Decision Making/Summary/Discussion:7/1/2020   ·   Infection Control Recommendations   · [place] Precautions  · Contact Isolation MDRO Enterobacter  · Droplet isolation    Antimicrobial Stewardship Recommendations     · Simplification of therapy  · Targeted therapy    Coordination of Outpatient Care:   · Estimated Length of IV antimicrobials:7-7-20  · Patient will need Midline Catheter Insertion: No  · Patient will need PICC line Insertion:No  · Patient will need: Home IV , [place],  SNF,  LTAC:TBD  · Patient will need outpatient wound care:No    Chief complaint/reason for consultation:   · RLL pneumonia with MDRO Enterobacter    History of Present Illness:   Michelle Enriquez is a 69y.o.-year-old  male who was initially admitted on 6/27/2020. Patient seen at the request of Jareth Marie. INITIAL HISTORY:    Patient patient was recently hospitalized at [facility] because of shortness of breath cough and hemoptysis. He was diagnosed as having congestive heart failure, paroxysmal atrial fibrillation and sick sinus syndrome with associated leg edema. He was treated by Dr. Kamron Murphy with improvement in symptoms. In addition he was also diagnosed as having a a right basilar infiltrate which was considered to be a community acquired pneumonia. The sputum on 6/17/28 grew a multidrug resistant Enterobacter. The patient was treated by Dr. Man Walters with a combination of Zosyn and vancomycin.   The antibiotics were given for a period of 7 days. The infectious diseases service  signed off on 6/26/20. The patient was experiencing hemoptysis while at [facility].  He has continued to experience hemoptysis cough and expectoration and consequently decided to come to [address] as he felt that his treatment at [facility] had not accomplished control of his pneumonia. The patient has an extensive past medical history which includes COPD, MARIAMA, axis normal atrial fibrillation with sick sinus syndrome, chronic diastolic heart failure, chronic leg edema. At [facility] the patient is currently fairly stable but has edema of both lower extremities particularly on the left side. He has some crackles at several of the lung bases. His chest x-ray on his CT scan of the chest showed the presence of a similar broncho-pneumonia at the right base. Based on previous sputum culture at [facility] the organism that was isolated is resistant to Zosyn which he had received there. We will plan to treat him with a combination of ciprofloxacin 750 mg by mouth twice daily and meropenem 1 g IV every 8 hours, based on the pattern of sensitivity. CURRENT EVALUATION: 07/01/20     Patient seen and examined bedside. Labs and chart reviewed. No acute overnight events. Afebrile. Vital signs stable. No leukocytosis. No respiratory distress today. Patient reports his SOB improved. Bronchodilators per primary. Continuing on Bumex  Bilateral lower extremity edema persisting. Labs, X rays reviewed: 7/1/2020    BUN: 14-->10  Cr: 0.74-->0.96    WBC: 7.2-->4.8  Hb: 8.5-->8.2  Plat: 199-->171    Cultures:  Urine:  ·   Blood:  ·   Sputum :  · 6/17/20 Enterobacter cloacae  Wound:  ·   MRSA screen positive    Discussed with patient, RN. IM    I have personally reviewed the past medical history, past surgical history, medications, social history, and family history, and I have updated the database accordingly.   Past Medical History:     Past Medical History:   Diagnosis Date    Acute superficial gastritis without hemorrhage 5/26/2018    Anastomotic stricture of stomach     Asthma     Atrial fibrillation ([address]     On Xarelto 6/27/2020    Calculus of gallbladder without cholecystitis without obstruction 5/2/2017    Chronic diastolic heart failure ([address] 11/17/2017    Chronic rhinosinusitis 4/13/2015    Class 2 severe obesity due to excess calories with serious comorbidity and body mass index (BMI) of 36.0 to 36.9 in adult ([address] 11/17/2017    E. coli septicemia ([address]     E. 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 coli ([address]     Due to UTI     Simple chronic bronchitis ([address] 4/10/2018    Slow transit constipation 11/3/2016    Unspecified sleep apnea     UTI (urinary tract infection)        Past Surgical  History:     Past Surgical History:   Procedure Laterality Date    CARPAL TUNNEL RELEASE      bilateral    COLONOSCOPY      CYSTOSCOPY  01/02/2019    by Dr. Vivienne Castanon N/A 1/2/2019    CYSTOSCOPY performed by Gurvinder Mclaughlin MD at [address]    first knuckle right index finger    GASTRIC BYPASS SURGERY  1985    vertical banded gastroplasty   [address] REPLACEMENT  2004 & 2005    bilateral knees    [place] EGD [address] N/A 8/16/2018    EGD FOREIGN BODY REMOVAL performed by Juan F Aden MD at [address] EGD TRANSORAL BIOPSY SINGLE/MULTIPLE N/A 5/25/2018    EGD BIOPSY performed by Javier Mckeon DO at [address] [facility]      left shoulder    UPPER GASTROINTESTINAL ENDOSCOPY  08/13/2018    removal of food bolus    UPPER GASTROINTESTINAL ENDOSCOPY N/A 8/13/2018    EGD FOREIGN BODY REMOVAL performed by Javier Mckeon DO at [place] [address] N/A 8/13/2018    EGD BIOPSY performed by Javier Mckeon DO at [place] [address]  08/16/2018    egd with balloon dilitation    UPPER GASTROINTESTINAL ENDOSCOPY  8/16/2018    EGD DILATION BALLOON performed by Juan F Aden MD at [place] [address] N/A 10/1/2018    EGD BIOPSY performed by El Bernstein MD at [address] [place] [address]  10/1/2018    EGD DILATION BALLOON performed by El Bernstein MD at [address] [place] [address] N/A 11/19/2018    EGD DILATION BALLOON performed by El Bernstein MD at [facility]       Medications:      rivaroxaban  20 mg Oral Daily with breakfast    pantoprazole  40 mg Oral Daily    bumetanide  1 mg Oral Daily    cetirizine  10 mg Oral Daily    fluticasone  2 spray Nasal Daily    budesonide-formoterol  2 puff Inhalation BID    gabapentin  300 mg Oral TID    guaiFENesin  600 mg Oral BID    potassium chloride  20 mEq Oral BID    meropenem  1 g Intravenous Q8H    ciprofloxacin  750 mg Oral 2 times per day    sodium chloride flush  10 mL Intravenous 2 times per day    ipratropium-albuterol  1 ampule Inhalation Q4H [place]       Social History:     Social History     Socioeconomic History    Marital status:      Spouse name: Not on file    Number of children: Not on file    Years of education: Not on file    Highest education level: Not on file Occupational History    Not on file   Social Needs    Financial resource strain: Not on file    Food insecurity     Worry: Not on file     Inability: Not on file    Transportation needs     Medical: Not on file     Non-medical: Not on file   Tobacco Use    Smoking status: Former Smoker     Packs/day: 1.00     Years: 20.00     Pack years: 20.00     Last attempt to quit: 1976     Years since quittin.5    Smokeless tobacco: Never Used   Substance and Sexual Activity    Alcohol use: No    Drug use: No    Sexual activity: Not on file   Lifestyle    Physical activity     Days per week: Not on file     Minutes per session: Not on file    Stress: Not on file   Relationships    Social connections     Talks on phone: Not on file     Gets together: Not on file     Attends Scientologist service: Not on file     Active member of club or organization: Not on file     Attends meetings of clubs or organizations: Not on file     Relationship status: Not on file    Intimate partner violence     Fear of current or ex partner: Not on file     Emotionally abused: Not on file     Physically abused: Not on file     Forced sexual activity: Not on file   Other Topics Concern    Not on file   Social History Narrative    Not on file       Family History:     Family History   Problem Relation Age of Onset    Cancer Mother     Heart Attack Father         Allergies:   Darvocet [propoxyphene n-acetaminophen]; Other; and Oxycodone-acetaminophen     Review of Systems:   Constitutional: No fevers or chills. No systemic complaints  Head: No headaches  Eyes: No double vision or blurry vision. No conjunctival inflammation. ENT: No sore throat or runny nose. . No hearing loss, tinnitus or vertigo. Cardiovascular: No chest pain or palpitations. Shortness of breath. JONES  Lung: Shortness of breath, cough. Hemoptysis  Abdomen: No nausea, vomiting, diarrhea, or abdominal pain. Geralene Rachelle No cramps.   Genitourinary: No increased urinary frequency, or dysuria. No hematuria. No suprapubic or CVA pain  Musculoskeletal: No muscle aches or pains. No joint effusions, swelling or deformities. Bilateral leg edema  Hematologic: No bleeding or bruising. Neurologic: No headache, weakness, numbness, or tingling. Integument: No rash, no ulcers. Psychiatric: No depression. Endocrine: No polyuria, no polydipsia, no polyphagia. Physical Examination :     Patient Vitals for the past 8 hrs:   BP Temp Temp src Pulse Resp SpO2   07/01/20 1255 -- -- -- -- 18 100 %   07/01/20 0800 111/69 97.8 °F (36.6 °C) Oral 64 14 100 %     General Appearance: Awake, alert, and in no apparent distress  Head:  Normocephalic, no trauma  Eyes: Pupils equal, round, reactive to light and accommodation; extraocular movements intact; sclera anicteric; conjunctivae pink. No embolic phenomena. ENT: Oropharynx clear, without erythema, exudate, or thrush. No tenderness of sinuses. Mouth/throat: mucosa pink and moist. No lesions. Dentition in good repair. Neck:Supple, without lymphadenopathy. Thyroid normal, No bruits. Pulmonary/Chest: Clear to auscultation, without wheezes, rales, or rhonchi. No dullness to percussion. Cardiovascular: Regular rate and rhythm without murmurs, rubs, or gallops. Abdomen: Soft, non tender. Bowel sounds normal. No organomegaly  All four Extremities: No cyanosis, clubbing, edema, or effusions. Neurologic: No gross sensory or motor deficits. Skin: Warm and dry with good turgor. No signs of peripheral arterial or venous insufficiency. No ulcerations. No open wounds.     Medical Decision Making -Laboratory:   I have independently reviewed/ordered the following labs:    CBC with Differential:   Recent Labs     06/30/20 0538 07/01/20  0541   WBC 4.2 4.8   HGB 7.9* 8.2*   HCT 27.1* 28.3*    171   LYMPHOPCT 16* 17*   MONOPCT 16* 17*     BMP:   Recent Labs     06/30/20  0538 07/01/20  0541    137   K 4.0 4.1    100   CO2 24 27   BUN 10 10 CREATININE 0.63* 0.96     Hepatic Function Panel:   No results for input(s): PROT, LABALBU, BILIDIR, IBILI, BILITOT, ALKPHOS, ALT, AST in the last 72 hours. No results for input(s): RPR in the last 72 hours. No results for input(s): HIV in the last 72 hours. No results for input(s): BC in the last 72 hours. Lab Results   Component Value Date    MUCUS NOT REPORTED 06/27/2020    RBC 3.01 07/01/2020    RBC 4.39 09/13/2011    TRICHOMONAS NOT REPORTED 06/27/2020    WBC 4.8 07/01/2020    YEAST NOT REPORTED 06/27/2020    TURBIDITY CLEAR 06/27/2020     Lab Results   Component Value Date    CREATININE 0.96 07/01/2020    GLUCOSE 95 07/01/2020    GLUCOSE 99 09/13/2011       Medical Decision Making-Imaging:     EXAMINATION:   CTA OF THE CHEST 6/27/2020 2:08 pm       TECHNIQUE:   CTA of the chest was performed after the administration of intravenous   contrast.  Multiplanar reformatted images are provided for review.  MIP   images are provided for review. Dose modulation, iterative reconstruction,   and/or weight based adjustment of the mA/kV was utilized to reduce the   radiation dose to as low as reasonably achievable.       COMPARISON:   None.       HISTORY:   ORDERING SYSTEM PROVIDED HISTORY: R/o PE - hemoptysis x4d   TECHNOLOGIST PROVIDED HISTORY:   R/o PE - hemoptysis x4d       FINDINGS:   Pulmonary Arteries: Pulmonary arteries are adequately opacified for   evaluation.  No evidence of intraluminal filling defect to suggest pulmonary   embolism.  Main pulmonary artery is normal in caliber.       Mediastinum: No evidence of mediastinal lymphadenopathy.  The heart and   pericardium demonstrate no acute abnormality.  There is no acute abnormality   of the thoracic aorta.       Lungs/pleura: There are nodular areas of consolidation the right lower lobe. There is trace right-sided effusion. Charlcie Sedan is no pneumothorax.  The   tracheobronchial tree is patent.       Upper Abdomen:  There is cholelithiasis.       Soft Tissues/Bones: No acute bone or soft tissue abnormality. Ricka Backers is a   right thyroid lobe nodule measuring 2.4 x 1.6 cm.           Impression   No acute or chronic pulmonary embolism.       Nodular areas of consolidation in the right lower lobe consistent with   pneumonia.  Trace right-sided effusion.           Medical Decision [id number]-Nkhdn:     Component Name Value Ref Range   Gram Stain Result 0 to 1 WHITE BLOOD CELLS/LPF     Gram Stain Result 0 to 1 SQUAMOUS EPITHELIAL CELLS/LPF     Gram Stain Result 0 CILIATED EPITHELIAL CELLS/LPF     Gram Stain Result MANY GRAM POSITIVE COCCI     Gram Stain Result MANY YEAST     Culture MODERATE ENTEROBACTER CLOACAE COMPLEX (A)     Culture MODERATE LYUBOV ALBICANS (A)     Specimen Collected on   Sputum - Sputum 6/17/2020 3:15 PM     Organism Antibiotic Method Susceptibility   Enterobacter cloacae complex Amikacin KAITY METHOD <=2: Susceptible    Cefazolin KAITY METHOD >=64: Resistant    Cefepime KAITY METHOD 2: Susceptible    Cefotaxime KAITY METHOD >=64: Resistant    Ceftriaxone KAITY METHOD >=64: Resistant    Cefuroxime KAITY METHOD >=64: Resistant    Ciprofloxacin KAITY METHOD <=0.25: Susceptible    Gentamicin KAITY METHOD <=1: Susceptible    IMIPENEM KAITY METHOD <=0.25: Susceptible    Levofloxacin KAITY METHOD <=0.12: Susceptible    Meropenem KAITY METHOD <=0.25: Susceptible    PIPERACIL/TAZOBACTAM KAITY METHOD >=128: Resistant    Tobramycin KAITY METHOD <=1: Susceptible    TRIMETH/SULFAMETHOXAZOLE KAITY METHOD <=1/19: Susceptible    Ertapenem KAITY METHOD 4: Resistant    MULTIDRUG RESISTANCE/MDRO KAITY METHOD POSITIVE    Comment:   NON CARABPENEM PRODUCING  CARBAPENEM RESISTANT  ENTEROBACTERIACAE  NO CARBAPENEMASE DETECTED       Medical Decision Making-Other:     Note:  · Labs, medications, radiologic studies were reviewed with personal review of films  · Large amounts of data were reviewed  · Discussed with nursing Staff, Discharge planner  · Infection Control and Prevention measures reviewed  · All prior entries were reviewed  · Administer medications as ordered  · Prognosis: Guarded  · Discharge planning reviewed  · Follow up as outpatient. Thank you for allowing us to participate in the care of this patient. Please call with questions. MD Luca Esqueda;  [facility]  7/1/2020, 2:46 PM

## 2020-07-02 LAB
ABSOLUTE EOS #: 0.23 K/UL (ref 0–0.44)
ABSOLUTE IMMATURE GRANULOCYTE: 0 K/UL (ref 0–0.3)
ABSOLUTE LYMPH #: 0.69 K/UL (ref 1.1–3.7)
ABSOLUTE MONO #: 0.69 K/UL (ref 0.1–1.2)
ANION GAP SERPL CALCULATED.3IONS-SCNC: 10 MMOL/L (ref 9–17)
BASOPHILS # BLD: 1 % (ref 0–2)
BASOPHILS ABSOLUTE: 0.05 K/UL (ref 0–0.2)
BUN BLDV-MCNC: 11 MG/DL (ref 8–23)
BUN/CREAT BLD: ABNORMAL (ref 9–20)
CALCIUM SERPL-MCNC: 8.5 MG/DL (ref 8.6–10.4)
CHLORIDE BLD-SCNC: 99 MMOL/L (ref 98–107)
CO2: 27 MMOL/L (ref 20–31)
CREAT SERPL-MCNC: 0.86 MG/DL (ref 0.7–1.2)
DIFFERENTIAL TYPE: ABNORMAL
EOSINOPHILS RELATIVE PERCENT: 5 % (ref 1–4)
GFR AFRICAN AMERICAN: >60 ML/MIN
GFR NON-AFRICAN AMERICAN: >60 ML/MIN
GFR SERPL CREATININE-BSD FRML MDRD: ABNORMAL ML/MIN/{1.73_M2}
GFR SERPL CREATININE-BSD FRML MDRD: ABNORMAL ML/MIN/{1.73_M2}
GLUCOSE BLD-MCNC: 90 MG/DL (ref 70–99)
HCT VFR BLD CALC: 28.6 % (ref 40.7–50.3)
HEMOGLOBIN: 8.5 G/DL (ref 13–17)
IMMATURE GRANULOCYTES: 0 %
LYMPHOCYTES # BLD: 15 % (ref 24–43)
MCH RBC QN AUTO: 27.2 PG (ref 25.2–33.5)
MCHC RBC AUTO-ENTMCNC: 29.7 G/DL (ref 28.4–34.8)
MCV RBC AUTO: 91.7 FL (ref 82.6–102.9)
MONOCYTES # BLD: 15 % (ref 3–12)
MORPHOLOGY: ABNORMAL
NRBC AUTOMATED: 0 PER 100 WBC
PDW BLD-RTO: 24.8 % (ref 11.8–14.4)
PLATELET # BLD: 179 K/UL (ref 138–453)
PLATELET ESTIMATE: ABNORMAL
PMV BLD AUTO: 8.8 FL (ref 8.1–13.5)
POTASSIUM SERPL-SCNC: 3.9 MMOL/L (ref 3.7–5.3)
RBC # BLD: 3.12 M/UL (ref 4.21–5.77)
RBC # BLD: ABNORMAL 10*6/UL
SEG NEUTROPHILS: 64 % (ref 36–65)
SEGMENTED NEUTROPHILS ABSOLUTE COUNT: 2.94 K/UL (ref 1.5–8.1)
SODIUM BLD-SCNC: 136 MMOL/L (ref 135–144)
WBC # BLD: 4.6 K/UL (ref 3.5–11.3)
WBC # BLD: ABNORMAL 10*3/UL

## 2020-07-02 PROCEDURE — 6370000000 HC RX 637 (ALT 250 FOR IP): Performed by: INTERNAL MEDICINE

## 2020-07-02 PROCEDURE — 2580000003 HC RX 258: Performed by: HOSPITALIST

## 2020-07-02 PROCEDURE — 94669 MECHANICAL CHEST WALL OSCILL: CPT

## 2020-07-02 PROCEDURE — 1200000000 HC SEMI PRIVATE

## 2020-07-02 PROCEDURE — 99232 SBSQ HOSP IP/OBS MODERATE 35: CPT | Performed by: INTERNAL MEDICINE

## 2020-07-02 PROCEDURE — 36415 COLL VENOUS BLD VENIPUNCTURE: CPT

## 2020-07-02 PROCEDURE — 76937 US GUIDE VASCULAR ACCESS: CPT

## 2020-07-02 PROCEDURE — 2580000003 HC RX 258: Performed by: INTERNAL MEDICINE

## 2020-07-02 PROCEDURE — 97110 THERAPEUTIC EXERCISES: CPT

## 2020-07-02 PROCEDURE — C1751 CATH, INF, PER/CENT/MIDLINE: HCPCS

## 2020-07-02 PROCEDURE — 80048 BASIC METABOLIC PNL TOTAL CA: CPT

## 2020-07-02 PROCEDURE — 97530 THERAPEUTIC ACTIVITIES: CPT

## 2020-07-02 PROCEDURE — 94640 AIRWAY INHALATION TREATMENT: CPT

## 2020-07-02 PROCEDURE — 2500000003 HC RX 250 WO HCPCS: Performed by: HOSPITALIST

## 2020-07-02 PROCEDURE — 85025 COMPLETE CBC W/AUTO DIFF WBC: CPT

## 2020-07-02 PROCEDURE — 99232 SBSQ HOSP IP/OBS MODERATE 35: CPT | Performed by: HOSPITALIST

## 2020-07-02 PROCEDURE — 97116 GAIT TRAINING THERAPY: CPT

## 2020-07-02 PROCEDURE — 6370000000 HC RX 637 (ALT 250 FOR IP): Performed by: NURSE PRACTITIONER

## 2020-07-02 PROCEDURE — 6360000002 HC RX W HCPCS: Performed by: INTERNAL MEDICINE

## 2020-07-02 RX ORDER — LIDOCAINE HYDROCHLORIDE 10 MG/ML
5 INJECTION, SOLUTION EPIDURAL; INFILTRATION; INTRACAUDAL; PERINEURAL ONCE
Status: DISCONTINUED | OUTPATIENT
Start: 2020-07-02 | End: 2020-07-03 | Stop reason: HOSPADM

## 2020-07-02 RX ORDER — SODIUM CHLORIDE 0.9 % (FLUSH) 0.9 %
10 SYRINGE (ML) INJECTION PRN
Status: DISCONTINUED | OUTPATIENT
Start: 2020-07-02 | End: 2020-07-03 | Stop reason: HOSPADM

## 2020-07-02 RX ORDER — METOPROLOL SUCCINATE 25 MG/1
25 TABLET, EXTENDED RELEASE ORAL 2 TIMES DAILY
Qty: 60 TABLET | Refills: 1 | Status: SHIPPED | OUTPATIENT
Start: 2020-07-02 | End: 2020-07-16 | Stop reason: SDUPTHER

## 2020-07-02 RX ORDER — SODIUM CHLORIDE 0.9 % (FLUSH) 0.9 %
10 SYRINGE (ML) INJECTION EVERY 12 HOURS SCHEDULED
Status: DISCONTINUED | OUTPATIENT
Start: 2020-07-02 | End: 2020-07-03 | Stop reason: HOSPADM

## 2020-07-02 RX ADMIN — POTASSIUM CHLORIDE 20 MEQ: 1500 TABLET, EXTENDED RELEASE ORAL at 20:51

## 2020-07-02 RX ADMIN — IPRATROPIUM BROMIDE AND ALBUTEROL SULFATE 1 AMPULE: .5; 3 SOLUTION RESPIRATORY (INHALATION) at 21:27

## 2020-07-02 RX ADMIN — MEROPENEM 1 G: 1 INJECTION, POWDER, FOR SOLUTION INTRAVENOUS at 09:17

## 2020-07-02 RX ADMIN — GUAIFENESIN 600 MG: 600 TABLET, EXTENDED RELEASE ORAL at 09:14

## 2020-07-02 RX ADMIN — CIPROFLOXACIN 750 MG: 750 TABLET, FILM COATED ORAL at 09:14

## 2020-07-02 RX ADMIN — MEROPENEM 1 G: 1 INJECTION, POWDER, FOR SOLUTION INTRAVENOUS at 01:36

## 2020-07-02 RX ADMIN — FLUTICASONE PROPIONATE 2 SPRAY: 50 SPRAY, METERED NASAL at 09:14

## 2020-07-02 RX ADMIN — MEROPENEM 1 G: 1 INJECTION, POWDER, FOR SOLUTION INTRAVENOUS at 18:41

## 2020-07-02 RX ADMIN — IBUPROFEN 600 MG: 400 TABLET, FILM COATED ORAL at 21:03

## 2020-07-02 RX ADMIN — GABAPENTIN 300 MG: 300 CAPSULE ORAL at 11:02

## 2020-07-02 RX ADMIN — DIPHENHYDRAMINE HCL 25 MG: 25 TABLET ORAL at 23:02

## 2020-07-02 RX ADMIN — BUMETANIDE 1 MG: 0.25 INJECTION INTRAMUSCULAR; INTRAVENOUS at 23:02

## 2020-07-02 RX ADMIN — RIVAROXABAN 20 MG: 20 TABLET, FILM COATED ORAL at 09:14

## 2020-07-02 RX ADMIN — IPRATROPIUM BROMIDE AND ALBUTEROL SULFATE 1 AMPULE: .5; 3 SOLUTION RESPIRATORY (INHALATION) at 12:08

## 2020-07-02 RX ADMIN — CETIRIZINE HYDROCHLORIDE 10 MG: 10 TABLET ORAL at 09:14

## 2020-07-02 RX ADMIN — BUDESONIDE AND FORMOTEROL FUMARATE DIHYDRATE 2 PUFF: 160; 4.5 AEROSOL RESPIRATORY (INHALATION) at 21:28

## 2020-07-02 RX ADMIN — BUDESONIDE AND FORMOTEROL FUMARATE DIHYDRATE 2 PUFF: 160; 4.5 AEROSOL RESPIRATORY (INHALATION) at 08:31

## 2020-07-02 RX ADMIN — BUMETANIDE 1 MG: 0.25 INJECTION INTRAMUSCULAR; INTRAVENOUS at 11:02

## 2020-07-02 RX ADMIN — GABAPENTIN 300 MG: 300 CAPSULE ORAL at 20:52

## 2020-07-02 RX ADMIN — POTASSIUM CHLORIDE 20 MEQ: 1500 TABLET, EXTENDED RELEASE ORAL at 09:14

## 2020-07-02 RX ADMIN — PANTOPRAZOLE SODIUM 40 MG: 40 TABLET, DELAYED RELEASE ORAL at 09:14

## 2020-07-02 RX ADMIN — IPRATROPIUM BROMIDE AND ALBUTEROL SULFATE 1 AMPULE: .5; 3 SOLUTION RESPIRATORY (INHALATION) at 08:31

## 2020-07-02 RX ADMIN — Medication 10 ML: at 20:52

## 2020-07-02 RX ADMIN — IPRATROPIUM BROMIDE AND ALBUTEROL SULFATE 1 AMPULE: .5; 3 SOLUTION RESPIRATORY (INHALATION) at 17:20

## 2020-07-02 RX ADMIN — GUAIFENESIN 600 MG: 600 TABLET, EXTENDED RELEASE ORAL at 20:51

## 2020-07-02 RX ADMIN — CIPROFLOXACIN 750 MG: 750 TABLET, FILM COATED ORAL at 20:52

## 2020-07-02 RX ADMIN — GABAPENTIN 300 MG: 300 CAPSULE ORAL at 14:00

## 2020-07-02 ASSESSMENT — PAIN DESCRIPTION - DESCRIPTORS
DESCRIPTORS: ACHING
DESCRIPTORS: PRESSURE

## 2020-07-02 ASSESSMENT — PAIN SCALES - GENERAL
PAINLEVEL_OUTOF10: 3

## 2020-07-02 ASSESSMENT — PAIN DESCRIPTION - LOCATION
LOCATION: LEG
LOCATION: KNEE

## 2020-07-02 ASSESSMENT — PAIN DESCRIPTION - ORIENTATION: ORIENTATION: RIGHT;LEFT

## 2020-07-02 ASSESSMENT — PAIN DESCRIPTION - PAIN TYPE
TYPE: CHRONIC PAIN
TYPE: CHRONIC PAIN

## 2020-07-02 NOTE — PROGRESS NOTES
[facility] - Progress note  Today's Date and Time: 7/2/2020, 8:07 AM    Impression :   · Rt basilar bronchopneumonia with isolation of MDR Enterobacter from sputum at [facility] 6-17-20  · Shortness of breath  · Cough  · Hemoptysis  · COPD  · MARIAMA  · Diastolic CHF  · COVID tests;  · 6-14-20: Negative    Recommendations:     · Continue Cipro 750 mg po BID  · Continue meropenem 1 gm IV q 8 hr  · Monitor for clinical response  · Diuresis  · Fluid restriction  · Further recommendations by Dr. Mirta Mckenzie to follow. Medical Decision Making/Summary/Discussion:7/2/2020   ·   Infection Control Recommendations   · [place] Precautions  · Contact Isolation MDRO Enterobacter  · Droplet isolation    Antimicrobial Stewardship Recommendations     · Simplification of therapy  · Targeted therapy    Coordination of Outpatient Care:   · Estimated Length of IV antimicrobials:7-7-20  · Patient will need Midline Catheter Insertion: No  · Patient will need PICC line Insertion:No  · Patient will need: Home IV , [place],  SNF,  LTAC:TBD  · Patient will need outpatient wound care:No    Chief complaint/reason for consultation:   · RLL pneumonia with MDRO Enterobacter    History of Present Illness:   Aylin Haas is a 69y.o.-year-old  male who was initially admitted on 6/27/2020. Patient seen at the request of Usha Mason. INITIAL HISTORY:    Patient patient was recently hospitalized at [facility] because of shortness of breath cough and hemoptysis. He was diagnosed as having congestive heart failure, paroxysmal atrial fibrillation and sick sinus syndrome with associated leg edema. He was treated by Dr. Aviva Sol with improvement in symptoms. In addition he was also diagnosed as having a a right basilar infiltrate which was considered to be a community acquired pneumonia. The sputum on 6/17/28 grew a multidrug resistant Enterobacter.   The patient was treated by  Rinda Carbine with a combination of Zosyn and vancomycin. The antibiotics were given for a period of 7 days. The infectious diseases service  signed off on 6/26/20. The patient was experiencing hemoptysis while at [facility].  He has continued to experience hemoptysis cough and expectoration and consequently decided to come to [facility] as he felt that his treatment at [facility] had not accomplished control of his pneumonia. The patient has an extensive past medical history which includes COPD, MARIAMA, axis normal atrial fibrillation with sick sinus syndrome, chronic diastolic heart failure, chronic leg edema. At [facility] the patient is currently fairly stable but has edema of both lower extremities particularly on the left side. He has some crackles at several of the lung bases. His chest x-ray on his CT scan of the chest showed the presence of a similar broncho-pneumonia at the right base. Based on previous sputum culture at [facility] the organism that was isolated is resistant to Zosyn which he had received there. We will plan to treat him with a combination of ciprofloxacin 750 mg by mouth twice daily and meropenem 1 g IV every 8 hours, based on the pattern of sensitivity. CURRENT EVALUATION: 07/02/20     Patient seen and examined bedside. Labs and chart reviewed. No acute overnight events. Afebrile. Vital signs stable. No leukocytosis. Patient says he still has pain sometimes when breathing. Bronchodilators per primary. Continuing on Bumex  Bilateral lower extremity edema persisting. Labs, X rays reviewed: 7/2/2020    BUN: 14-->10>11  Cr: 0.74-->0.96>0.86    WBC: 7.2-->4.8>4.6  Hb: 8.5-->8.2>8.5  Plat: 199-->171>179    Cultures:  Urine:  ·   Blood:  ·   Sputum :  · 6/17/20 Enterobacter cloacae  Wound:  ·   MRSA screen positive    Discussed with patient, RN. IM    I have personally reviewed the past medical history, past surgical history, medications, social history, and family history, and I have updated the database accordingly.   Past Medical History:     Past Medical History:   Diagnosis Date    Acute superficial gastritis without hemorrhage 5/26/2018    Anastomotic stricture of stomach     Asthma     Atrial fibrillation ([facility]     On Xarelto 6/27/2020    Calculus of gallbladder without cholecystitis without obstruction 5/2/2017    Chronic diastolic heart failure ([address] 11/17/2017    Chronic rhinosinusitis 4/13/2015    Class 2 severe obesity due to excess calories with serious comorbidity and body mass index (BMI) of 36.0 to 36.9 in adult ([address] 11/17/2017    E. coli septicemia ([facility]     E. coli UTI     Foot drop, right 7/10/2012    Fracture of femur (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 coli (HCC)     Due to UTI     Simple chronic bronchitis ([address] 4/10/2018    Slow transit constipation 11/3/2016    Unspecified sleep apnea     UTI (urinary tract infection)        Past Surgical  History:     Past Surgical History:   Procedure Laterality Date    CARPAL TUNNEL RELEASE      bilateral    COLONOSCOPY      CYSTOSCOPY  01/02/2019    by Dr. Musa Ingram N/A 1/2/2019    CYSTOSCOPY performed by Bautista Espinoza MD at [address]    first knuckle right index finger    GASTRIC BYPASS SURGERY  1985    vertical banded gastroplasty    HEMORRHOID SURGERY  JOINT REPLACEMENT  2004 & 2005    bilateral knees    ND EGD FLEXIBLE FOREIGN BODY REMOVAL N/A 8/16/2018    EGD FOREIGN BODY REMOVAL performed by Jennifer Lynn MD at [address] EGD TRANSORAL BIOPSY SINGLE/MULTIPLE N/A 5/25/2018    EGD BIOPSY performed by Nikia Glass DO at [address] [facility]      left shoulder    UPPER GASTROINTESTINAL ENDOSCOPY  08/13/2018    removal of food bolus    UPPER GASTROINTESTINAL ENDOSCOPY N/A 8/13/2018    EGD FOREIGN BODY REMOVAL performed by Nikia Glass DO at [facility] 8/13/2018    EGD [facility]SY [facility] Glass DO [facility]  08/16/2018    egd with balloon dilitation    UPPER GASTROINTESTINAL ENDOSCOPY  8/16/2018    EGD DILATION BALLOON performed by Jennifer Lynn MD at [address] [place] [address] N/A 10/1/2018    EGD BIOPSY performed by Graeme Lo MD at [address] [place] [address]  10/1/2018    EGD DILATION BALLOON performed by Graeme Lo MD at [address] [place] [address] N/A 11/19/2018    EGD DILATION BALLOON performed by Graeme Lo MD at [facility]       Medications:      bumetanide  1 mg Intravenous BID    rivaroxaban  20 mg Oral Daily with breakfast    pantoprazole  40 mg Oral Daily    cetirizine  10 mg Oral Daily    fluticasone  2 spray Nasal Daily    budesonide-formoterol  2 puff Inhalation BID    gabapentin  300 mg Oral TID    guaiFENesin  600 mg Oral BID    potassium chloride  20 mEq Oral BID    meropenem  1 g Intravenous Q8H    ciprofloxacin  750 mg Oral 2 times per day    sodium chloride flush  10 mL Intravenous 2 times per day    ipratropium-albuterol  1 ampule Inhalation Q4H WA       Social History:     Social History     Socioeconomic History    Marital status:      Spouse name: Not on file    Number of children: Not on file    Years of education: Not on file    Highest education level: Not on file   Occupational History    Not on file   Social Needs    Financial resource strain: Not on file    Food insecurity     Worry: Not on file     Inability: Not on file    Transportation needs     Medical: Not on file     Non-medical: Not on file   Tobacco Use    Smoking status: Former Smoker     Packs/day: 1.00     Years: 20.00     Pack years: 20.00     Last attempt to quit: 1976     Years since quittin.5    Smokeless tobacco: Never Used   Substance and Sexual Activity    Alcohol use: No    Drug use: No    Sexual activity: Not on file   Lifestyle    Physical activity     Days per week: Not on file     Minutes per session: Not on file    Stress: Not on file   Relationships    Social connections     Talks on phone: Not on file     Gets together: Not on file     Attends Nondenominational service: Not on file     Active member of club or organization: Not on file     Attends meetings of clubs or organizations: Not on file     Relationship status: Not on file    Intimate partner violence     Fear of current or ex partner: Not on file     Emotionally abused: Not on file     Physically abused: Not on file     Forced sexual activity: Not on file   Other Topics Concern    Not on file   Social History Narrative    Not on file       Family History:     Family History   Problem Relation Age of Onset    Cancer Mother     Heart Attack Father         Allergies:   Darvocet [propoxyphene n-acetaminophen]; Other; and Oxycodone-acetaminophen     Review of Systems:   Constitutional: No fevers or chills. No systemic complaints  Head: No headaches  Eyes: No double vision or blurry vision. No conjunctival inflammation. ENT: No sore throat or runny nose. . No hearing loss, tinnitus or vertigo. Cardiovascular: No chest pain or palpitations. Shortness of breath. JONES  Lung: Shortness of breath, cough.   Hemoptysis  Abdomen: No nausea, vomiting, diarrhea, or abdominal pain. Stanley Suha No cramps. Genitourinary: No increased urinary frequency, or dysuria. No hematuria. No suprapubic or CVA pain  Musculoskeletal: No muscle aches or pains. No joint effusions, swelling or deformities. Bilateral leg edema  Hematologic: No bleeding or bruising. Neurologic: No headache, weakness, numbness, or tingling. Integument: No rash, no ulcers. Psychiatric: No depression. Endocrine: No polyuria, no polydipsia, no polyphagia. Physical Examination :     Patient Vitals for the past 8 hrs:   BP Temp Temp src Pulse Resp SpO2 Weight   07/02/20 0734 102/61 98.5 °F (36.9 °C) Oral 79 20 98 % --   07/02/20 0536 -- -- -- -- -- -- 222 lb 8 oz (100.9 kg)   07/02/20 0326 -- -- -- -- 22 -- --   07/02/20 0022 -- -- -- -- 24 -- --     General Appearance: Awake, alert, and in no apparent distress  Head:  Normocephalic, no trauma  Eyes: Pupils equal, round, reactive to light and accommodation; extraocular movements intact; sclera anicteric; conjunctivae pink. No embolic phenomena. ENT: Oropharynx clear, without erythema, exudate, or thrush. No tenderness of sinuses. Mouth/throat: mucosa pink and moist. No lesions. Dentition in good repair. Neck:Supple, without lymphadenopathy. Thyroid normal, No bruits. Pulmonary/Chest: Clear to auscultation, without wheezes, rales, or rhonchi. No dullness to percussion. Cardiovascular: Regular rate and rhythm without murmurs, rubs, or gallops. Abdomen: Soft, non tender. Bowel sounds normal. No organomegaly  All four Extremities: No cyanosis, clubbing, edema, or effusions. Neurologic: No gross sensory or motor deficits. Skin: Warm and dry with good turgor. No signs of peripheral arterial or venous insufficiency. No ulcerations. No open wounds.     Medical Decision Making -Laboratory:   I have independently reviewed/ordered the following labs:    CBC with Differential:   Recent Labs     07/01/20  0541 07/02/20  0625   WBC 4.8 4.6   HGB 8.2* 8.5*   HCT 28.3* 28.6*  179   LYMPHOPCT 17* PENDING   MONOPCT 17* PENDING     BMP:   Recent Labs     07/01/20  0541 07/02/20  0625    136   K 4.1 3.9    99   CO2 27 27   BUN 10 11   CREATININE 0.96 0.86     Hepatic Function Panel:   No results for input(s): PROT, LABALBU, BILIDIR, IBILI, BILITOT, ALKPHOS, ALT, AST in the last 72 hours. No results for input(s): RPR in the last 72 hours. No results for input(s): HIV in the last 72 hours. No results for input(s): BC in the last 72 hours. Lab Results   Component Value Date    MUCUS NOT REPORTED 06/27/2020    RBC 3.12 07/02/2020    RBC 4.39 09/13/2011    TRICHOMONAS NOT REPORTED 06/27/2020    WBC 4.6 07/02/2020    YEAST NOT REPORTED 06/27/2020    TURBIDITY CLEAR 06/27/2020     Lab Results   Component Value Date    CREATININE 0.86 07/02/2020    GLUCOSE 90 07/02/2020    GLUCOSE 99 09/13/2011       Medical Decision Making-Imaging:     EXAMINATION:   CTA OF THE CHEST 6/27/2020 2:08 pm       TECHNIQUE:   CTA of the chest was performed after the administration of intravenous   contrast.  Multiplanar reformatted images are provided for review.  MIP   images are provided for review. Dose modulation, iterative reconstruction,   and/or weight based adjustment of the mA/kV was utilized to reduce the   radiation dose to as low as reasonably achievable.       COMPARISON:   None.       HISTORY:   ORDERING SYSTEM PROVIDED HISTORY: R/o PE - hemoptysis x4d   TECHNOLOGIST PROVIDED HISTORY:   R/o PE - hemoptysis x4d       FINDINGS:   Pulmonary Arteries: Pulmonary arteries are adequately opacified for   evaluation.  No evidence of intraluminal filling defect to suggest pulmonary   embolism.  Main pulmonary artery is normal in caliber.       Mediastinum: No evidence of mediastinal lymphadenopathy.  The heart and   pericardium demonstrate no acute abnormality.  There is no acute abnormality   of the thoracic aorta.       Lungs/pleura:  There are nodular areas of consolidation the right lower personal review of films  · Large amounts of data were reviewed  · Discussed with nursing Staff, Discharge planner  · Infection Control and Prevention measures reviewed  · All prior entries were reviewed  · Administer medications as ordered  · Prognosis: Guarded  · Discharge planning reviewed  · Follow up as outpatient. Thank you for allowing us to participate in the care of this patient. Please call with questions. Corey Briggs, [address], [place]  7/2/2020, 8:07 AM

## 2020-07-02 NOTE — PLAN OF CARE
Problem: Nutrition  Goal: Optimal nutrition therapy  Outcome: Ongoing  Note: Nutrition Problem: Inadequate oral intake  Intervention: Food and/or Nutrient Delivery: Continue current diet, Start ONS(Provide frozen Magic Cup supplements x 2 per day.)  Nutritional Goals: Oral intakes to meet % of estimated nutrition needs.

## 2020-07-02 NOTE — PLAN OF CARE
PROVIDE ADEQUATE OXYGENATION WITH ACCEPTABLE SP02/ABG'S    [x]  IDENTIFY APPROPRIATE OXYGEN THERAPY  [x]   MONITOR SP02/ABG'S AS NEEDED   [x]   PATIENT EDUCATION AS NEEDED     BRONCHOSPASM/BRONCHOCONSTRICTION     [x]         IMPROVE AERATION/BREATH SOUNDS  [x]   ADMINISTER BRONCHODILATOR THERAPY AS APPROPRIATE  [x]   ASSESS BREATH SOUNDS  []   IMPLEMENT AEROSOL/MDI PROTOCOL  [x]   PATIENT EDUCATION AS NEEDED

## 2020-07-02 NOTE — PROGRESS NOTES
[facility] - Progress note  Today's Date and Time: 7/2/2020, 9:46 AM    Impression :   · Rt basilar bronchopneumonia with isolation of MDR Enterobacter from sputum at [facility] 6-17-20  · Shortness of breath  · Cough  · Hemoptysis  · COPD  · MARIAMA  · Diastolic CHF  · COVID tests;  · 6-14-20: Negative    Recommendations:     · Continue Cipro 750 mg po BID. Stop date 7-14-20  · Continue meropenem 1 gm IV q 8 hr. Stop date 7-7-20  · Monitor for clinical response  · Diuresis  · Fluid restriction    Medical Decision Making/Summary/Discussion:7/2/2020   ·   Infection Control Recommendations   · Dalton Precautions  · Contact Isolation MDRO Enterobacter  · Droplet isolation    Antimicrobial Stewardship Recommendations     · Simplification of therapy  · Targeted therapy    Coordination of Outpatient Care:   · Estimated Length of IV antimicrobials:7-7-20  · Patient will need Midline Catheter Insertion: No  · Patient will need PICC line Insertion:No  · Patient will need: Home IV , [place],  SNF,  LTAC:TBD  · Patient will need outpatient wound care:No    Chief complaint/reason for consultation:   · RLL pneumonia with MDRO Enterobacter    History of Present Illness:   Vila Opitz is a 69y.o.-year-old  male who was initially admitted on 6/27/2020. Patient seen at the request of Flaca Jeronimo. INITIAL HISTORY:    Patient patient was recently hospitalized at [facility] because of shortness of breath cough and hemoptysis. He was diagnosed as having congestive heart failure, paroxysmal atrial fibrillation and sick sinus syndrome with associated leg edema. He was treated by Dr. Nelda Reynolds with improvement in symptoms. In addition he was also diagnosed as having a a right basilar infiltrate which was considered to be a community acquired pneumonia. The sputum on 6/17/28 grew a multidrug resistant Enterobacter.   The patient was treated by Dr. Zonia Dunaway with a combination of Zosyn and vancomycin. The antibiotics were given for a period of 7 days. The infectious diseases service  signed off on 6/26/20. The patient was experiencing hemoptysis while at [facility].  He has continued to experience hemoptysis cough and expectoration and consequently decided to come to [facility] as he felt that his treatment at [facility] had not accomplished control of his pneumonia. The patient has an extensive past medical history which includes COPD, MARIAMA, axis normal atrial fibrillation with sick sinus syndrome, chronic diastolic heart failure, chronic leg edema. At [facility] the patient is currently fairly stable but has edema of both lower extremities particularly on the left side. He has some crackles at several of the lung bases. His chest x-ray on his CT scan of the chest showed the presence of a similar broncho-pneumonia at the right base. Based on previous sputum culture at [facility] the organism that was isolated is resistant to Zosyn which he had received there. We will plan to treat him with a combination of ciprofloxacin 750 mg by mouth twice daily and meropenem 1 g IV every 8 hours, based on the pattern of sensitivity. CURRENT EVALUATION: 07/02/20     Patient seen and examined bedside. Labs and chart reviewed. No acute overnight events. Afebrile. Vital signs stable. No leukocytosis. No respiratory distress today. Patient reports his SOB improved. Bronchodilators per primary. Continuing on Bumex  Bilateral lower extremity edema persisting. Labs, X rays reviewed: 7/2/2020    BUN: 14-->11  Cr: 0.74-->0.86    WBC: 7.2-->4.6  Hb: 8.5-->8.5  Plat: 199-->179    Cultures:  Urine:  ·   Blood:  ·   Sputum :  · 6/17/20 Enterobacter cloacae  Wound:  ·   MRSA screen positive    Discussed with patient, RN. IM    I have personally reviewed the past medical history, past surgical history, medications, social history, and family history, and I have updated the database accordingly.   Past Medical History:     Past Medical History:   Diagnosis Date    Acute superficial gastritis without hemorrhage 5/26/2018    Anastomotic stricture of stomach     Asthma     Atrial fibrillation ([facility]     On Xarelto 6/27/2020    Calculus of gallbladder without cholecystitis without obstruction 5/2/2017    Chronic diastolic heart failure ([address] 11/17/2017    Chronic rhinosinusitis 4/13/2015    Class 2 severe obesity due to excess calories with serious comorbidity and body mass index (BMI) of 36.0 to 36.9 in adult ([address] 11/17/2017    E. coli septicemia ([facility]     E. 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 coli (HCC)     Due to UTI     Simple chronic bronchitis ([address] 4/10/2018    Slow transit constipation 11/3/2016    Unspecified sleep apnea     UTI (urinary tract infection)        Past Surgical  History:     Past Surgical History:   Procedure Laterality Date    CARPAL TUNNEL RELEASE      bilateral    COLONOSCOPY      CYSTOSCOPY  01/02/2019    by Dr. Williams Cummings N/A 1/2/2019    CYSTOSCOPY performed by Mark Lou MD at [address]    first knuckle right index finger    GASTRIC BYPASS SURGERY  1985    vertical banded gastroplasty    HEMORRHOID SURGERY      JOINT REPLACEMENT 2004 & 2005    bilateral knees    SC EGD FLEXIBLE FOREIGN BODY REMOVAL N/A 8/16/2018    EGD FOREIGN BODY REMOVAL performed by Kori Ruiz MD at [address] EGD TRANSORAL BIOPSY SINGLE/MULTIPLE N/A 5/25/2018    EGD BIOPSY performed by Bal Arita DO at [address] [facility]      left shoulder    UPPER GASTROINTESTINAL ENDOSCOPY  08/13/2018    removal of food bolus    UPPER GASTROINTESTINAL ENDOSCOPY N/A 8/13/2018    EGD FOREIGN BODY REMOVAL performed by Bal Arita DO at [facility] 8/13/2018    EGD BIOPSY performed by Bal Arita DO at [facility]  08/16/2018    egd with balloon dilitation    UPPER GASTROINTESTINAL ENDOSCOPY  8/16/2018    EGD DILATION BALLOON performed by Kori Ruiz MD at [facility] N/A 10/1/2018    EGD BIOPSY performed by Amber Dempsey MD at [address] [place] [address]  10/1/2018    EGD DILATION BALLOON performed by Amber Dempsey MD at [address] [place] [address] N/A 11/19/2018    EGD DILATION BALLOON performed by Amber Dempsey MD at [facility]       Medications:      bumetanide  1 mg Intravenous BID    rivaroxaban  20 mg Oral Daily with breakfast    pantoprazole  40 mg Oral Daily    cetirizine  10 mg Oral Daily    fluticasone  2 spray Nasal Daily    budesonide-formoterol  2 puff Inhalation BID    gabapentin  300 mg Oral TID    guaiFENesin  600 mg Oral BID    potassium chloride  20 mEq Oral BID    meropenem  1 g Intravenous Q8H    ciprofloxacin  750 mg Oral 2 times per day    sodium chloride flush  10 mL Intravenous 2 times per day    ipratropium-albuterol  1 ampule Inhalation Q4H WA       Social History:     Social History     Socioeconomic History    Marital status:      Spouse name: Not on file    Number of children: Not on file    Years of education: Not on file    Highest education level: Not on file   Occupational History    Not on file   Social Needs    Financial resource strain: Not on file    Food insecurity     Worry: Not on file     Inability: Not on file    Transportation needs     Medical: Not on file     Non-medical: Not on file   Tobacco Use    Smoking status: Former Smoker     Packs/day: 1.00     Years: 20.00     Pack years: 20.00     Last attempt to quit: 1976     Years since quittin.5    Smokeless tobacco: Never Used   Substance and Sexual Activity    Alcohol use: No    Drug use: No    Sexual activity: Not on file   Lifestyle    Physical activity     Days per week: Not on file     Minutes per session: Not on file    Stress: Not on file   Relationships    Social connections     Talks on phone: Not on file     Gets together: Not on file     Attends Yazdanism service: Not on file     Active member of club or organization: Not on file     Attends meetings of clubs or organizations: Not on file     Relationship status: Not on file    Intimate partner violence     Fear of current or ex partner: Not on file     Emotionally abused: Not on file     Physically abused: Not on file     Forced sexual activity: Not on file   Other Topics Concern    Not on file   Social History Narrative    Not on file       Family History:     Family History   Problem Relation Age of Onset    Cancer Mother     Heart Attack Father         Allergies:   Darvocet [propoxyphene n-acetaminophen]; Other; and Oxycodone-acetaminophen     Review of Systems:   Constitutional: No fevers or chills. No systemic complaints  Head: No headaches  Eyes: No double vision or blurry vision. No conjunctival inflammation. ENT: No sore throat or runny nose. . No hearing loss, tinnitus or vertigo. Cardiovascular: No chest pain or palpitations. Shortness of breath. JONES  Lung: Shortness of breath, cough. Hemoptysis  Abdomen: No nausea, vomiting, diarrhea, or abdominal pain. Gerry Hires  No cramps. Genitourinary: No increased urinary frequency, or dysuria. No hematuria. No suprapubic or CVA pain  Musculoskeletal: No muscle aches or pains. No joint effusions, swelling or deformities. Bilateral leg edema  Hematologic: No bleeding or bruising. Neurologic: No headache, weakness, numbness, or tingling. Integument: No rash, no ulcers. Psychiatric: No depression. Endocrine: No polyuria, no polydipsia, no polyphagia. Physical Examination :     Patient Vitals for the past 8 hrs:   BP Temp Temp src Pulse Resp SpO2 Weight   07/02/20 0734 102/61 98.5 °F (36.9 °C) Oral 79 20 98 % --   07/02/20 0536 -- -- -- -- -- -- 222 lb 8 oz (100.9 kg)   07/02/20 0326 -- -- -- -- 22 -- --     General Appearance: Awake, alert, and in no apparent distress  Head:  Normocephalic, no trauma  Eyes: Pupils equal, round, reactive to light and accommodation; extraocular movements intact; sclera anicteric; conjunctivae pink. No embolic phenomena. ENT: Oropharynx clear, without erythema, exudate, or thrush. No tenderness of sinuses. Mouth/throat: mucosa pink and moist. No lesions. Dentition in good repair. Neck:Supple, without lymphadenopathy. Thyroid normal, No bruits. Pulmonary/Chest: Clear to auscultation, without wheezes, rales, or rhonchi. No dullness to percussion. Cardiovascular: Regular rate and rhythm without murmurs, rubs, or gallops. Abdomen: Soft, non tender. Bowel sounds normal. No organomegaly  All four Extremities: No cyanosis, clubbing, edema, or effusions. Neurologic: No gross sensory or motor deficits. Skin: Warm and dry with good turgor. No signs of peripheral arterial or venous insufficiency. No ulcerations. No open wounds.     Medical Decision Making -Laboratory:   I have independently reviewed/ordered the following labs:    CBC with Differential:   Recent Labs     07/01/20  0541 07/02/20  0625   WBC 4.8 4.6   HGB 8.2* 8.5*   HCT 28.3* 28.6*    179   LYMPHOPCT 17* 15*   MONOPCT 17* 15*     BMP: Recent Labs     07/01/20  0541 07/02/20  0625    136   K 4.1 3.9    99   CO2 27 27   BUN 10 11   CREATININE 0.96 0.86     Hepatic Function Panel:   No results for input(s): PROT, LABALBU, BILIDIR, IBILI, BILITOT, ALKPHOS, ALT, AST in the last 72 hours. No results for input(s): RPR in the last 72 hours. No results for input(s): HIV in the last 72 hours. No results for input(s): BC in the last 72 hours. Lab Results   Component Value Date    MUCUS NOT REPORTED 06/27/2020    RBC 3.12 07/02/2020    RBC 4.39 09/13/2011    TRICHOMONAS NOT REPORTED 06/27/2020    WBC 4.6 07/02/2020    YEAST NOT REPORTED 06/27/2020    TURBIDITY CLEAR 06/27/2020     Lab Results   Component Value Date    CREATININE 0.86 07/02/2020    GLUCOSE 90 07/02/2020    GLUCOSE 99 09/13/2011       Medical Decision Making-Imaging:     EXAMINATION:   CTA OF THE CHEST 6/27/2020 2:08 pm       TECHNIQUE:   CTA of the chest was performed after the administration of intravenous   contrast.  Multiplanar reformatted images are provided for review.  MIP   images are provided for review. Dose modulation, iterative reconstruction,   and/or weight based adjustment of the mA/kV was utilized to reduce the   radiation dose to as low as reasonably achievable.       COMPARISON:   None.       HISTORY:   ORDERING SYSTEM PROVIDED HISTORY: R/o PE - hemoptysis x4d   TECHNOLOGIST PROVIDED HISTORY:   R/o PE - hemoptysis x4d       FINDINGS:   Pulmonary Arteries: Pulmonary arteries are adequately opacified for   evaluation.  No evidence of intraluminal filling defect to suggest pulmonary   embolism.  Main pulmonary artery is normal in caliber.       Mediastinum: No evidence of mediastinal lymphadenopathy.  The heart and   pericardium demonstrate no acute abnormality.  There is no acute abnormality   of the thoracic aorta.       Lungs/pleura: There are nodular areas of consolidation the right lower lobe.    There is trace right-sided effusion. Charlynne Plane is no pneumothorax.  The   tracheobronchial tree is patent.       Upper Abdomen:  There is cholelithiasis.       Soft Tissues/Bones: No acute bone or soft tissue abnormality. Sherrie Yon is a   right thyroid lobe nodule measuring 2.4 x 1.6 cm.           Impression   No acute or chronic pulmonary embolism.       Nodular areas of consolidation in the right lower lobe consistent with   pneumonia.  Trace right-sided effusion.           Medical Decision Sllxng-Sdnuzmfz-Fdyfe:     Component Name Value Ref Range   Gram Stain Result 0 to 1 WHITE BLOOD CELLS/LPF     Gram Stain Result 0 to 1 SQUAMOUS EPITHELIAL CELLS/LPF     Gram Stain Result 0 CILIATED EPITHELIAL CELLS/LPF     Gram Stain Result MANY GRAM POSITIVE COCCI     Gram Stain Result MANY YEAST     Culture MODERATE ENTEROBACTER CLOACAE COMPLEX (A)     Culture MODERATE LYUBOV ALBICANS (A)     Specimen Collected on   Sputum - Sputum 6/17/2020 3:15 PM     Organism Antibiotic Method Susceptibility   Enterobacter cloacae complex Amikacin KAITY METHOD <=2: Susceptible    Cefazolin KAITY METHOD >=64: Resistant    Cefepime KAITY METHOD 2: Susceptible    Cefotaxime KAITY METHOD >=64: Resistant    Ceftriaxone KAITY METHOD >=64: Resistant    Cefuroxime KAITY METHOD >=64: Resistant    Ciprofloxacin KAITY METHOD <=0.25: Susceptible    Gentamicin KAITY METHOD <=1: Susceptible    IMIPENEM KAITY METHOD <=0.25: Susceptible    Levofloxacin KAITY METHOD <=0.12: Susceptible    Meropenem KAITY METHOD <=0.25: Susceptible    PIPERACIL/TAZOBACTAM KAITY METHOD >=128: Resistant    Tobramycin KAITY METHOD <=1: Susceptible    TRIMETH/SULFAMETHOXAZOLE KAITY METHOD <=1/19: Susceptible    Ertapenem KAITY METHOD 4: Resistant    MULTIDRUG RESISTANCE/MDRO KAITY METHOD POSITIVE    Comment:   NON CARABPENEM PRODUCING  CARBAPENEM RESISTANT  ENTEROBACTERIACAE  NO CARBAPENEMASE DETECTED       Medical Decision Making-Other:     Note:  · Labs, medications, radiologic studies were reviewed with personal review of films  · Large amounts of data were reviewed  · Discussed with nursing Staff, Discharge planner  · Infection Control and Prevention measures reviewed  · All prior entries were reviewed  · Administer medications as ordered  · Prognosis: Guarded  · Discharge planning reviewed  · Follow up as outpatient. Thank you for allowing us to participate in the care of this patient. Please call with questions. Priyanka Zaman MD    [facility];  [place], [place]  7/2/2020, 9:46 AM

## 2020-07-02 NOTE — PLAN OF CARE
Problem: Falls - Risk of:  Goal: Will remain free from falls  Description: Will remain free from falls  7/2/2020 0409 by José Richey RN  Outcome: Ongoing  7/1/2020 1816 by Moe Joe RN  Outcome: Ongoing  Goal: Absence of physical injury  Description: Absence of physical injury  7/2/2020 0409 by José Richey RN  Outcome: Ongoing  7/1/2020 1816 by Moe Joe RN  Outcome: Ongoing     Problem: Skin Integrity:  Goal: Will show no infection signs and symptoms  Description: Will show no infection signs and symptoms  7/2/2020 0409 by José Richey RN  Outcome: Ongoing  7/1/2020 1816 by Moe Joe RN  Outcome: Ongoing  Goal: Absence of new skin breakdown  Description: Absence of new skin breakdown  7/2/2020 0409 by José Richey RN  Outcome: Ongoing  7/1/2020 1816 by Moe Joe RN  Outcome: Ongoing     Problem: Pain:  Goal: Pain level will decrease  Description: Pain level will decrease  7/2/2020 0409 by José Richey RN  Outcome: Ongoing  7/1/2020 1816 by Moe Joe RN  Outcome: Ongoing  Goal: Control of acute pain  Description: Control of acute pain  7/1/2020 1816 by Moe Joe RN  Outcome: Ongoing  Goal: Control of chronic pain  Description: Control of chronic pain  7/2/2020 0409 by José Richey RN  Outcome: Ongoing  7/1/2020 1816 by Moe Joe RN  Outcome: Ongoing     Problem: Musculor/Skeletal Functional Status  Goal: Highest potential functional level  7/1/2020 1816 by Moe Joe RN  Outcome: Ongoing

## 2020-07-02 NOTE — PROGRESS NOTES
Physical Therapy  Facility/Department: [facility]  Daily Treatment Note  NAME: Isreal Stover  : 1945  MRN: [id number]    Date of Service: 2020    Discharge Recommendations:  Patient would benefit from continued therapy after discharge      Assessment   Body structures, Functions, Activity limitations: Decreased functional mobility ; Decreased endurance;Decreased balance;Decreased strength  Assessment: Pt completing functional mobility without physical assistance and demos good safety awareness. Would benefit from continued PT at home to maximize functional outcomes. Prognosis: Good  PT Education: General Safety;Home Exercise Program;Gait Training  REQUIRES PT FOLLOW UP: Yes  Activity Tolerance  Activity Tolerance: Patient limited by endurance; Patient Tolerated treatment well     Patient Diagnosis(es): The encounter diagnosis was Pneumonia due to organism. has a past medical history of Acute superficial gastritis without hemorrhage, Anastomotic stricture of stomach, Asthma, Atrial fibrillation ([address] Calculus of gallbladder without cholecystitis without obstruction, Chronic diastolic heart failure (HCC), Chronic rhinosinusitis, Class 2 severe obesity due to excess calories with serious comorbidity and body mass index (BMI) of 36.0 to 36.9 in adult ([address] E. coli septicemia ([address] E. coli UTI, Foot drop, right, Fracture of femur ([address] Gait disorder, Gastric out let obstruction, GERD (gastroesophageal reflux disease), Hallux valgus, acquired, bilateral, Hyperlipidemia, Hypertension, Impaired hearing, Internal hemorrhoids, Lymphedema of both lower extremities, Nausea & vomiting, OA (osteoarthritis) of knee, bilateral, Obesity, MARIAMA on CPAP, Osteoarthritis, Osteoarthritis of lumbar spine, S/P revision of total knee, Septicemia due to E. coli ([address] Simple chronic bronchitis ([address] Slow transit constipation, Unspecified sleep apnea, and UTI (urinary tract infection).    has a past surgical history that includes Finger amputation (1966); Gastric bypass surgery (1985); Carpal tunnel release; Colonoscopy; Rotator cuff repair; joint replacement (2004 & 2005); Hemorrhoid surgery; pr egd transoral biopsy single/multiple (N/A, 5/25/2018); Upper gastrointestinal endoscopy (08/13/2018); Upper gastrointestinal endoscopy (N/A, 8/13/2018); Upper gastrointestinal endoscopy (N/A, 8/13/2018); Upper gastrointestinal endoscopy (08/16/2018); pr egd flexible foreign body removal (N/A, 8/16/2018); Upper gastrointestinal endoscopy (8/16/2018); Upper gastrointestinal endoscopy (N/A, 10/1/2018); Upper gastrointestinal endoscopy (10/1/2018); Upper gastrointestinal endoscopy (N/A, 11/19/2018); Cystoscopy (01/02/2019); and Cystoscopy (N/A, 1/2/2019). Restrictions  Restrictions/Precautions  Restrictions/Precautions: Up as Tolerated, General Precautions, Fall Risk  Required Braces or Orthoses?: Yes  Required Braces or Orthoses  Right Lower Extremity Brace: Ankle Foot Orthotics  Left Lower Extremity Brace: Erika Foot Orthotics  Subjective   General  Chart Reviewed: Yes  Response To Previous Treatment: Patient with no complaints from previous session. Family / Caregiver Present: No  Subjective  Subjective: Pt in bed; reports 3/10 BLE pain. Agreeable to PT  General Comment  Comments: Pt required assistance leobardo Tomlin.  Left in chair after session; call light in reach  Pain Screening  Patient Currently in Pain: Yes  Pain Assessment  Pain Level: 3  Pain Type: Chronic pain  Pain Location: Leg  Pain Orientation: Right;Left  Pain Descriptors: Aching  Non-Pharmaceutical Pain Intervention(s): Ambulation/Increased Activity  Vital Signs  Patient Currently in Pain: Yes       Orientation  Orientation  Overall Orientation Status: Within Normal Limits  Cognition      Objective   Bed mobility  Supine to Sit: Modified independent(utilized hand rail; completed with HOB elevated)  Scooting: Modified independent  Transfers  Sit to Stand: Stand

## 2020-07-02 NOTE — PROGRESS NOTES
Nutrition Assessment    Type and Reason for Visit: Initial(LOS)    Nutrition Recommendations:   - Continue current Low Sodium (2 gm), Dysphagia Soft and Bite-Sized diet with Mildly Thick (Nectar) liquids. Encourage intakes and monitor tolerance to diet consistency. - Will provide frozen Magic Cup supplements x 2 per day to boost PO intakes. - Monitor labs, bowel function, and plan of care. Nutrition Assessment: Pt seen based on length of stay. Admitted with c/o SOB and cough. Pt reports appetite has been decreased. Consuming about 50% of his meals. Labs/Meds reviewed. Will provide ONS to boost PO intakes. Malnutrition Assessment:  · Malnutrition Status: At risk for malnutrition  · Context: Acute illness or injury  · Findings of the 6 clinical characteristics of malnutrition (Minimum of 2 out of 6 clinical characteristics is required to make the diagnosis of moderate or severe Protein Calorie Malnutrition based on AND/ASPEN Guidelines):  1. Energy Intake-Less than or equal to 75% of estimated energy requirement, Greater than or equal to 5 days    2. Weight Loss-No significant weight loss, in 6 months  3. Fat Loss-No significant subcutaneous fat loss,    4. Muscle Loss-No significant muscle mass loss,    5. Fluid Accumulation-Moderate to severe fluid accumulation, Extremities    Nutrition Risk Level: High    Nutrient Needs:  · Estimated Daily Total Kcal: 2391-1585 kcals/day  · Estimated Daily Protein (g):  gm pro/day    Nutrition Diagnosis:   · Problem: Inadequate oral intake  · Etiology: related to (Current Medical Condition)     Signs and symptoms:  as evidenced by (variable PO intakes, need for oral supplements)    Objective Information:  · Nutrition-Focused Physical Findings: Last recorded BM 6/29. Labs reviewed. Meds reviewed: Protonix, Bumex, Cipro.   · Wound Type: None  · Current Nutrition Therapies:  · Oral Diet Orders: 2gm Sodium, Dysphagia  Soft and Bite-Sized (Dysphagia 3), Mildly Thick   · Oral Diet intake: 26-50%, 51-75%  · Anthropometric Measures:  · Ht: 5' 10\" (177.8 cm)   · Current Body Wt: 222 lb 8 oz (100.9 kg)  · Admission Body Wt: 223 lb 12.8 oz (101.5 kg)  · Ideal Body Wt: 165 lb 12.6 oz (75.2 kg), % Ideal Body 135%  · BMI Classification: BMI 30.0 - 34.9 Obese Class I    Nutrition Interventions:   Continue current diet, Start ONS(Provide frozen Magic Cup supplements x 2 per day.)  Continued Inpatient Monitoring, Education Not Indicated    Nutrition Evaluation:   · Evaluation: Goals set   · Goals: Oral intakes to meet % of estimated nutrition needs.     · Monitoring: Meal Intake, Supplement Intake, Diet Tolerance, Skin Integrity, I&O, Pertinent Labs, Weight, Monitor Bowel Function, Monitor Hemodynamic Status    Electronically signed by Yelena Rodriguez RD, LD on 7/2/20 at 3:49 PM EDT    Contact Number: [phone]

## 2020-07-02 NOTE — PROGRESS NOTES
Q4[facility] [place]     Continuous Infusions:    sodium chloride 15 mL/hr at 20 1401     PRN Meds: sodium chloride flush, diphenhydrAMINE, ibuprofen, albuterol, sodium chloride flush, magnesium hydroxide, promethazine **OR** ondansetron, nicotine, albuterol    Data:     Past Medical History:   has a past medical history of Acute superficial gastritis without hemorrhage, Anastomotic stricture of stomach, Asthma, Atrial fibrillation ([address] Calculus of gallbladder without cholecystitis without obstruction, Chronic diastolic heart failure ([address] Chronic rhinosinusitis, Class 2 severe obesity due to excess calories with serious comorbidity and body mass index (BMI) of 36.0 to 36.9 in adult ([facility] [address] E. coli septicemia ([facility] [address] E. coli UTI, Foot drop, right, Fracture of femur ([facility] [address] Gait disorder, Gastric out let obstruction, GERD (gastroesophageal reflux disease), Hallux valgus, acquired, bilateral, Hyperlipidemia, Hypertension, Impaired hearing, Internal hemorrhoids, Lymphedema of both lower extremities, Nausea & vomiting, OA (osteoarthritis) of knee, bilateral, Obesity, MARIAMA on CPAP, Osteoarthritis, Osteoarthritis of lumbar spine, S/P revision of total knee, Septicemia due to E. coli ([facility] [address] Simple chronic bronchitis ([facility] [address] Slow transit constipation, Unspecified sleep apnea, and UTI (urinary tract infection). Social History:   reports that he quit smoking about 43 years ago. He has a 20.00 pack-year smoking history. He has never used smokeless tobacco. He reports that he does not drink alcohol or use drugs. Family History:   Family History   Problem Relation Age of Onset   [facility] Cancer Mother     Heart Attack Father        Vitals:  /61   Pulse 79   Temp 98.5 °F (36.9 °C) (Oral)   Resp 20   Ht 5' 10\" (1.778 m)   Wt 222 lb 8 oz (100.9 kg)   SpO2 98%   BMI 31.93 kg/m²   Temp (24hrs), Av.6 °F (37 °C), Min:98.5 °F (36.9 °C), Max:98.6 °F (37 °C)    No results for input(s): POCGLU in the last 72 hours.     I/O (24Hr): Intake/Output Summary (Last 24 hours) at 7/2/2020 1108  Last data filed at 7/2/2020 0536  Gross per 24 hour   Intake 626 ml   Output 1775 ml   Net -1149 ml       Labs:  Hematology:  Recent Labs     06/30/20 0538 07/01/20  0541 07/02/20  0625   WBC 4.2 4.8 4.6   RBC 2.95* 3.01* 3.12*   HGB 7.9* 8.2* 8.5*   HCT 27.1* 28.3* 28.6*   MCV 91.9 94.0 91.7   MCH 26.8 27.2 27.2   MCHC 29.2 29.0 29.7   RDW 24.3* 24.9* 24.8*    171 179   MPV 8.7 9.1 8.8     Chemistry:  Recent Labs     06/30/20 0538 07/01/20  0541 07/02/20  0625    137 136   K 4.0 4.1 3.9    100 99   CO2 24 27 27   GLUCOSE 95 95 90   BUN 10 10 11   CREATININE 0.63* 0.96 0.86   ANIONGAP 13 10 10   LABGLOM >60 >60 >60   GFRAA >60 >60 >60   CALCIUM 8.1* 8.4* 8.5*   No results for input(s): PROT, LABALBU, LABA1C, D3CQTYY, A4KYTLP, FT4, TSH, AST, ALT, LDH, GGT, ALKPHOS, LABGGT, BILITOT, BILIDIR, AMMONIA, AMYLASE, LIPASE, LACTATE, CHOL, HDL, LDLCHOLESTEROL, CHOLHDLRATIO, TRIG, VLDL, ICZ63QR, PHENYTOIN, PHENYF, URICACID, POCGLU in the last 72 hours. ABG:No results found for: POCPH, PHART, PH, POCPCO2, KMW5AID, PCO2, POCPO2, PO2ART, PO2, POCHCO3, ROV2AMB, HCO3, NBEA, PBEA, BEART, BE, THGBART, THB, HSH4GIF, VMUZ7DAM, Q7KPUKWB, O2SAT, FIO2  Lab Results   Component Value Date/Time    SPECIAL NOT REPORTED 10/21/2019 01:25 PM     Lab Results   Component Value Date/Time    CULTURE NO GROWTH 10/21/2019 01:25 PM       Radiology:  Xr Chest Standard (2 Vw)    Result Date: 6/28/2020  1. Multifocal pneumonia primarily at the right lung base, increased from the prior study. Small bilateral pleural effusions. Follow-up is recommended to document resolution. 2. Stable moderate elevation of the right hemidiaphragm. Xr Chest Standard (2 Vw)    Result Date: 6/27/2020  Subtle right basilar infiltrate representing atelectasis versus pneumonia. Ct Chest Pulmonary Embolism W Contrast    Result Date: 6/27/2020  No acute or chronic pulmonary embolism. Nodular areas of consolidation in the right lower lobe consistent with pneumonia. Trace right-sided effusion. Physical Examination:        General appearance:  alert, cooperative and no distress  Mental Status:  oriented to person, place and time and normal affect  Lungs:  clear to auscultation bilaterally, normal effort  Heart:  regular rate and rhythm, no murmur  Abdomen:  soft, nontender, nondistended, normal bowel sounds, no masses, hepatomegaly, splenomegaly  Extremities:  no edema, redness, tenderness in the calves  Skin:  no gross lesions, rashes, induration    Assessment:        Hospital Problems           Last Modified POA    * (Principal) Hospital-acquired bacterial pneumonia 7/1/2020 Yes    Acute on chronic diastolic heart failure (HCC) 7/1/2020 Yes    Slow transit constipation 7/1/2020 Yes    Simple chronic bronchitis ([facility] [address] 7/1/2020 Yes    Atrial fibrillation ([facility] [address] 7/1/2020 Yes    Hyperlipidemia 7/1/2020 Yes    GERD (gastroesophageal reflux disease) 7/1/2020 Yes    Hypertension 7/1/2020 Yes    MARIAMA on CPAP 7/1/2020 Yes    Class 2 severe obesity due to excess calories with serious comorbidity and body mass index (BMI) of 36.0 to 36.9 in adult ([facility] [address] 7/1/2020 Yes    Fluid overload 7/1/2020 Yes          Plan:        1. Hospital-acquired bacterial pneumonia- patient presents with hemoptysis and cough. On 6/17/2020, patient had isolation of MDR Enterobacter from sputum. Patient currently on meropenem and Cipro. Infectious disease on board. Patient to be on meropenem until 7/7/2020. Patient to be on p.o. Cipro until 7/14/2020. We will set up home health for patient to receive the rest of his IV Merrem on discharge. Patient to get midline placed on 7/2/2020.  2. Acute on chronic diastolic heart failure- he does have a history of heart failure. Patient noted to have lower extremity swelling and bibasilar crackles. Patient will be placed on Bumex 1 mg IV twice daily.   Patient's fluid status will be reevaluated. Daily weights, strict I's and O's.  3. Fluid overload-patient states that he was receiving IV fluids when he was hospitalized at [facility] prior to this admission. Patient states he was there for 12 days. Patient most likely with fluid overload secondary to IV fluids given at that time. Patient given Bumex 1 mg IV twice a day to help mobilize fluid. 4. Atrial fibrillation-patient with history of atrial fibrillation. Patient will be continued on Xarelto. 5. Hyperlipidemia-patient with history of hyperlipidemia. Patient does not appear to be on any cholesterol-lowering medications. 6. GERD- patient with history of GERD. Patient be continued on Protonix. 7. Hypertension-patient with history of hypertension.   Patient to be continued on diuretic.  8. Obesity- BMI 32.2    Christie Georges MD  7/2/2020  11:08 AM

## 2020-07-03 VITALS
BODY MASS INDEX: 31.64 KG/M2 | HEART RATE: 66 BPM | SYSTOLIC BLOOD PRESSURE: 99 MMHG | HEIGHT: 70 IN | RESPIRATION RATE: 21 BRPM | TEMPERATURE: 98.6 F | OXYGEN SATURATION: 100 % | WEIGHT: 221 LBS | DIASTOLIC BLOOD PRESSURE: 55 MMHG

## 2020-07-03 PROBLEM — I50.33 ACUTE ON CHRONIC DIASTOLIC HEART FAILURE (HCC): Status: RESOLVED | Noted: 2017-11-17 | Resolved: 2020-07-03

## 2020-07-03 LAB
ABSOLUTE EOS #: 0.11 K/UL (ref 0–0.4)
ABSOLUTE IMMATURE GRANULOCYTE: 0 K/UL (ref 0–0.3)
ABSOLUTE LYMPH #: 0.65 K/UL (ref 1–4.8)
ABSOLUTE MONO #: 0.38 K/UL (ref 0.1–0.8)
ANION GAP SERPL CALCULATED.3IONS-SCNC: 10 MMOL/L (ref 9–17)
BASOPHILS # BLD: 2 % (ref 0–2)
BASOPHILS ABSOLUTE: 0.08 K/UL (ref 0–0.2)
BUN BLDV-MCNC: 13 MG/DL (ref 8–23)
BUN/CREAT BLD: ABNORMAL (ref 9–20)
CALCIUM SERPL-MCNC: 8.3 MG/DL (ref 8.6–10.4)
CHLORIDE BLD-SCNC: 101 MMOL/L (ref 98–107)
CO2: 27 MMOL/L (ref 20–31)
CREAT SERPL-MCNC: 0.82 MG/DL (ref 0.7–1.2)
DIFFERENTIAL TYPE: ABNORMAL
EOSINOPHILS RELATIVE PERCENT: 3 % (ref 1–4)
GFR AFRICAN AMERICAN: >60 ML/MIN
GFR NON-AFRICAN AMERICAN: >60 ML/MIN
GFR SERPL CREATININE-BSD FRML MDRD: ABNORMAL ML/MIN/{1.73_M2}
GFR SERPL CREATININE-BSD FRML MDRD: ABNORMAL ML/MIN/{1.73_M2}
GLUCOSE BLD-MCNC: 91 MG/DL (ref 70–99)
HCT VFR BLD CALC: 27.9 % (ref 40.7–50.3)
HEMOGLOBIN: 8.2 G/DL (ref 13–17)
IMMATURE GRANULOCYTES: 0 %
LYMPHOCYTES # BLD: 17 % (ref 24–44)
MCH RBC QN AUTO: 27.6 PG (ref 25.2–33.5)
MCHC RBC AUTO-ENTMCNC: 29.4 G/DL (ref 28.4–34.8)
MCV RBC AUTO: 93.9 FL (ref 82.6–102.9)
MONOCYTES # BLD: 10 % (ref 1–7)
MORPHOLOGY: ABNORMAL
NRBC AUTOMATED: 0 PER 100 WBC
PDW BLD-RTO: 24.8 % (ref 11.8–14.4)
PLATELET # BLD: 186 K/UL (ref 138–453)
PLATELET ESTIMATE: ABNORMAL
PMV BLD AUTO: 8.7 FL (ref 8.1–13.5)
POTASSIUM SERPL-SCNC: 3.8 MMOL/L (ref 3.7–5.3)
RBC # BLD: 2.97 M/UL (ref 4.21–5.77)
RBC # BLD: ABNORMAL 10*6/UL
SEG NEUTROPHILS: 68 % (ref 36–66)
SEGMENTED NEUTROPHILS ABSOLUTE COUNT: 2.58 K/UL (ref 1.8–7.7)
SODIUM BLD-SCNC: 138 MMOL/L (ref 135–144)
WBC # BLD: 3.8 K/UL (ref 3.5–11.3)
WBC # BLD: ABNORMAL 10*3/UL

## 2020-07-03 PROCEDURE — 36415 COLL VENOUS BLD VENIPUNCTURE: CPT

## 2020-07-03 PROCEDURE — 6370000000 HC RX 637 (ALT 250 FOR IP): Performed by: INTERNAL MEDICINE

## 2020-07-03 PROCEDURE — 97530 THERAPEUTIC ACTIVITIES: CPT

## 2020-07-03 PROCEDURE — 97110 THERAPEUTIC EXERCISES: CPT

## 2020-07-03 PROCEDURE — 99232 SBSQ HOSP IP/OBS MODERATE 35: CPT | Performed by: INTERNAL MEDICINE

## 2020-07-03 PROCEDURE — 2580000003 HC RX 258: Performed by: STUDENT IN AN ORGANIZED HEALTH CARE EDUCATION/TRAINING PROGRAM

## 2020-07-03 PROCEDURE — 94660 CPAP INITIATION&MGMT: CPT

## 2020-07-03 PROCEDURE — 2580000003 HC RX 258: Performed by: HOSPITALIST

## 2020-07-03 PROCEDURE — 85025 COMPLETE CBC W/AUTO DIFF WBC: CPT

## 2020-07-03 PROCEDURE — 80048 BASIC METABOLIC PNL TOTAL CA: CPT

## 2020-07-03 PROCEDURE — 6360000002 HC RX W HCPCS: Performed by: HOSPITALIST

## 2020-07-03 PROCEDURE — 2580000003 HC RX 258: Performed by: INTERNAL MEDICINE

## 2020-07-03 PROCEDURE — 94640 AIRWAY INHALATION TREATMENT: CPT

## 2020-07-03 PROCEDURE — 2700000000 HC OXYGEN THERAPY PER DAY

## 2020-07-03 PROCEDURE — 99239 HOSP IP/OBS DSCHRG MGMT >30: CPT | Performed by: HOSPITALIST

## 2020-07-03 PROCEDURE — 97116 GAIT TRAINING THERAPY: CPT

## 2020-07-03 PROCEDURE — 6360000002 HC RX W HCPCS: Performed by: INTERNAL MEDICINE

## 2020-07-03 RX ORDER — BUMETANIDE 1 MG/1
TABLET ORAL
Qty: 56 TABLET | Refills: 2
Start: 2020-07-03 | End: 2020-07-23

## 2020-07-03 RX ORDER — BUMETANIDE 1 MG/1
TABLET ORAL
Qty: 56 TABLET | Refills: 2 | Status: SHIPPED | OUTPATIENT
Start: 2020-07-03 | End: 2020-07-03 | Stop reason: SDUPTHER

## 2020-07-03 RX ORDER — CIPROFLOXACIN 750 MG/1
750 TABLET, FILM COATED ORAL EVERY 12 HOURS SCHEDULED
Qty: 22 TABLET | Refills: 0 | Status: SHIPPED | OUTPATIENT
Start: 2020-07-03 | End: 2020-07-14

## 2020-07-03 RX ORDER — FUROSEMIDE 10 MG/ML
40 INJECTION INTRAMUSCULAR; INTRAVENOUS 2 TIMES DAILY
Status: DISCONTINUED | OUTPATIENT
Start: 2020-07-03 | End: 2020-07-03 | Stop reason: HOSPADM

## 2020-07-03 RX ADMIN — POTASSIUM CHLORIDE 20 MEQ: 1500 TABLET, EXTENDED RELEASE ORAL at 08:54

## 2020-07-03 RX ADMIN — FLUTICASONE PROPIONATE 2 SPRAY: 50 SPRAY, METERED NASAL at 08:57

## 2020-07-03 RX ADMIN — IPRATROPIUM BROMIDE AND ALBUTEROL SULFATE 1 AMPULE: .5; 3 SOLUTION RESPIRATORY (INHALATION) at 17:01

## 2020-07-03 RX ADMIN — CIPROFLOXACIN 750 MG: 750 TABLET, FILM COATED ORAL at 08:54

## 2020-07-03 RX ADMIN — IPRATROPIUM BROMIDE AND ALBUTEROL SULFATE 1 AMPULE: .5; 3 SOLUTION RESPIRATORY (INHALATION) at 08:18

## 2020-07-03 RX ADMIN — PANTOPRAZOLE SODIUM 40 MG: 40 TABLET, DELAYED RELEASE ORAL at 08:54

## 2020-07-03 RX ADMIN — MEROPENEM 1 G: 1 INJECTION, POWDER, FOR SOLUTION INTRAVENOUS at 08:57

## 2020-07-03 RX ADMIN — GABAPENTIN 300 MG: 300 CAPSULE ORAL at 14:11

## 2020-07-03 RX ADMIN — GUAIFENESIN 600 MG: 600 TABLET, EXTENDED RELEASE ORAL at 08:54

## 2020-07-03 RX ADMIN — BUDESONIDE AND FORMOTEROL FUMARATE DIHYDRATE 2 PUFF: 160; 4.5 AEROSOL RESPIRATORY (INHALATION) at 08:18

## 2020-07-03 RX ADMIN — RIVAROXABAN 20 MG: 20 TABLET, FILM COATED ORAL at 08:54

## 2020-07-03 RX ADMIN — CETIRIZINE HYDROCHLORIDE 10 MG: 10 TABLET ORAL at 08:54

## 2020-07-03 RX ADMIN — GABAPENTIN 300 MG: 300 CAPSULE ORAL at 08:54

## 2020-07-03 RX ADMIN — IPRATROPIUM BROMIDE AND ALBUTEROL SULFATE 1 AMPULE: .5; 3 SOLUTION RESPIRATORY (INHALATION) at 11:27

## 2020-07-03 RX ADMIN — SODIUM CHLORIDE: 9 INJECTION, SOLUTION INTRAVENOUS at 01:41

## 2020-07-03 RX ADMIN — MEROPENEM 1 G: 1 INJECTION, POWDER, FOR SOLUTION INTRAVENOUS at 01:41

## 2020-07-03 RX ADMIN — Medication 10 ML: at 08:58

## 2020-07-03 RX ADMIN — FUROSEMIDE 40 MG: 10 INJECTION, SOLUTION INTRAMUSCULAR; INTRAVENOUS at 13:55

## 2020-07-03 ASSESSMENT — PAIN SCALES - GENERAL
PAINLEVEL_OUTOF10: 3
PAINLEVEL_OUTOF10: 0

## 2020-07-03 ASSESSMENT — PAIN DESCRIPTION - LOCATION: LOCATION: LEG

## 2020-07-03 ASSESSMENT — PAIN DESCRIPTION - ORIENTATION: ORIENTATION: RIGHT;LEFT

## 2020-07-03 ASSESSMENT — PAIN DESCRIPTION - PAIN TYPE: TYPE: CHRONIC PAIN

## 2020-07-03 ASSESSMENT — PULMONARY FUNCTION TESTS: PEFR_L/MIN: 80

## 2020-07-03 NOTE — PLAN OF CARE
Problem: RESPIRATORY  Intervention: Respiratory assessment  Note: BRONCHOSPASM/BRONCHOCONSTRICTION    IMPROVE  AERATION/BREATHSOUNDS  ADMINISTER BRONCHODILATOR THERAPY AS APPROPRIATE  ASSESS BREATH SOUNDS  PATIENT EDUCATION AS NEEDED     NONINVASIVE VENTILATION    PROVIDE OPTIMAL VENTILATION/ACCEPTABLE SPO2   IMPLEMENT NONINVASIVE VENTILATION PROTOCOL   MAINTAIN ACCEPTABLE SPO2   ASSESS SKIN INTEGRITY/BREAKDOWN SCORE   PATIENT EDUCATION AS NEEDED   BIPAP AS NEEDED

## 2020-07-03 NOTE — PROGRESS NOTES
[facility] - Progress note  Today's Date and Time: 7/3/2020, 8:27 AM    Impression :   · Rt basilar bronchopneumonia with isolation of MDR Enterobacter from sputum at [facility] 6-17-20  · Shortness of breath  · Cough  · Hemoptysis  · COPD  · MARIAMA  · Diastolic CHF  · COVID tests;  · 6-14-20: Negative    Recommendations:     · Continue Cipro 750 mg po BID. Stop date 7-14-20  · Continue meropenem 1 gm IV q 8 hr. Stop date 7-7-20  · Monitor for clinical response  · Diuresis  · Fluid restriction    Medical Decision Making/Summary/Discussion:7/3/2020   ·   Infection Control Recommendations   · Rufe Precautions  · Contact Isolation MDRO Enterobacter  · Droplet isolation    Antimicrobial Stewardship Recommendations     · Simplification of therapy  · Targeted therapy    Coordination of Outpatient Care:   · Estimated Length of IV antimicrobials:7-7-20  · Patient will need Midline Catheter Insertion: No  · Patient will need PICC line Insertion:No  · Patient will need: Home IV , [place],  SNF,  LTAC:TBD  · Patient will need outpatient wound care:No    Chief complaint/reason for consultation:   · RLL pneumonia with MDRO Enterobacter    History of Present Illness:   Giulia Peña is a 69y.o.-year-old  male who was initially admitted on 6/27/2020. Patient seen at the request of Kelsey Dietz. INITIAL HISTORY:    Patient patient was recently hospitalized at [facility] because of shortness of breath cough and hemoptysis. He was diagnosed as having congestive heart failure, paroxysmal atrial fibrillation and sick sinus syndrome with associated leg edema. He was treated by Dr. Juan Mason with improvement in symptoms. In addition he was also diagnosed as having a a right basilar infiltrate which was considered to be a community acquired pneumonia. The sputum on 6/17/28 grew a multidrug resistant Enterobacter.   The patient was treated by Dr. Ruslan Soria with a combination of Zosyn and vancomycin. The antibiotics were given for a period of 7 days. The infectious diseases service  signed off on 6/26/20. The patient was experiencing hemoptysis while at [facility].  He has continued to experience hemoptysis cough and expectoration and consequently decided to come to [facility] as he felt that his treatment at [facility] had not accomplished control of his pneumonia. The patient has an extensive past medical history which includes COPD, MARIAMA, axis normal atrial fibrillation with sick sinus syndrome, chronic diastolic heart failure, chronic leg edema. At [facility] the patient is currently fairly stable but has edema of both lower extremities particularly on the left side. He has some crackles at several of the lung bases. His chest x-ray on his CT scan of the chest showed the presence of a similar broncho-pneumonia at the right base. Based on previous sputum culture at [facility] the organism that was isolated is resistant to Zosyn which he had received there. We will plan to treat him with a combination of ciprofloxacin 750 mg by mouth twice daily and meropenem 1 g IV every 8 hours, based on the pattern of sensitivity. CURRENT EVALUATION: 07/03/20     Patient seen bedside. Labs and chart reviewed. No acute overnight events except that pt reports fever last night. Pt says he is feeling better. Alert, having breakfast.  He reports decrease in sputum. Afebrile. Vital signs : BP::99/55 at 0800  No leukocytosis. No respiratory distress today. Patient reports his SOB improved. Bronchodilators per primary. Continuing on Bumex  Bilateral lower extremity edema persisting.     Labs, X rays reviewed: 7/3/2020    BUN: 14-->11-->13  Cr: 0.74-->0.86-->0.82    WBC: 7.2-->4.6-->3.8  Hb: 8.5-->8.5-->8.2  Plat: 199-->179-->186    Cultures:  Urine:  ·   Blood:  ·   Sputum :  · 6/17/20 Enterobacter cloacae  Wound:  ·   MRSA screen positive    Discussed with patient, RN. IM    I have personally reviewed the past medical history, past surgical history, medications, social history, and family history, and I have updated the database accordingly.   Past Medical History:     Past Medical History:   Diagnosis Date    Acute superficial gastritis without hemorrhage 5/26/2018    Anastomotic stricture of stomach     Asthma     Atrial fibrillation ([address]     On Xarelto 6/27/2020    Calculus of gallbladder without cholecystitis without obstruction 5/2/2017    Chronic diastolic heart failure ([address] 11/17/2017    Chronic rhinosinusitis 4/13/2015    Class 2 severe obesity due to excess calories with serious comorbidity and body mass index (BMI) of 36.0 to 36.9 in adult ([address] 11/17/2017    E. coli septicemia ([address]     E. 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 coli (HCC)     Due to UTI     Simple chronic bronchitis ([address] 4/10/2018    Slow transit constipation 11/3/2016    Unspecified sleep apnea     UTI (urinary tract infection)        Past Surgical  History:     Past Surgical History:   Procedure Laterality Date    CARPAL TUNNEL RELEASE      bilateral    COLONOSCOPY      CYSTOSCOPY  01/02/2019    by Dr. Margarette Sims N/A 1/2/2019    CYSTOSCOPY performed by Elvis Umaña MD at [address] [facility] [address] AMPUTATION  1966    first knuckle right index finger    GASTRIC BYPASS SURGERY  1985    vertical banded gastroplasty    HEMORRHOID SURGERY      JOINT REPLACEMENT  2004 & 2005    bilateral knees    OK EGD FLEXIBLE FOREIGN BODY REMOVAL N/A 8/16/2018    EGD FOREIGN BODY REMOVAL performed by Ijeoma Patel MD at [address] [facility] EGD TRANSORAL BIOPSY SINGLE/MULTIPLE N/A 5/25/2018    EGD BIOPSY performed by Joshua Luna DO at [address] [facility]      left shoulder    UPPER GASTROINTESTINAL ENDOSCOPY  08/13/2018    removal of food bolus    UPPER GASTROINTESTINAL ENDOSCOPY N/A 8/13/2018    EGD FOREIGN BODY REMOVAL performed by Joshua Luna DO at [address] [place] [address] 8/13/2018    EGD BIOPSY performed by Joshua Luna DO at \[facility] [address].  08/16/2018    egd with balloon dilitation    UPPER GASTROINTESTINAL ENDOSCOPY  8/16/2018    EGD DILATION BALLOON performed by Ijeoma Patel MD at [address] [place] [address] 10/1/2018    EGD BIOPSY performed by Joseph Hennessy MD at [address] [place] [address]  10/1/2018    EGD DILATION BALLOON performed by Joseph Hennessy MD at [address] [place] [address] N/A 11/19/2018    EGD DILATION BALLOON performed by Joseph Hennessy MD at [facility]       Medications:      lidocaine 1 % injection  5 mL Intradermal Once    sodium chloride flush  10 mL Intravenous 2 times per day    bumetanide  1 mg Intravenous BID    rivaroxaban  20 mg Oral Daily with breakfast    pantoprazole  40 mg Oral Daily    cetirizine  10 mg Oral Daily    fluticasone  2 spray Nasal Daily    budesonide-formoterol  2 puff Inhalation BID    gabapentin  300 mg Oral TID    guaiFENesin  600 mg Oral BID    potassium chloride  20 mEq Oral BID    meropenem  1 g Intravenous Q8H    ciprofloxacin  750 mg Oral 2 times per day    sodium chloride flush  10 mL Intravenous 2 times per day    ipratropium-albuterol  1 ampule Inhalation Q4H WA       Social History:     Social History     Socioeconomic History    Marital status:      Spouse name: Not on file    Number of children: Not on file    Years of education: Not on file    Highest education level: Not on file   Occupational History    Not on file   Social Needs    Financial resource strain: Not on file    Food insecurity     Worry: Not on file     Inability: Not on file    Transportation needs     Medical: Not on file     Non-medical: Not on file   Tobacco Use    Smoking status: Former Smoker     Packs/day: 1.00     Years: 20.00     Pack years: 20.00     Last attempt to quit: 1976     Years since quittin.5    Smokeless tobacco: Never Used   Substance and Sexual Activity    Alcohol use: No    Drug use: No    Sexual activity: Not on file   Lifestyle    Physical activity     Days per week: Not on file     Minutes per session: Not on file    Stress: Not on file   Relationships    Social connections     Talks on phone: Not on file     Gets together: Not on file     Attends Hindu service: Not on file     Active member of club or organization: Not on file     Attends meetings of clubs or organizations: Not on file     Relationship status: Not on file    Intimate partner violence     Fear of current or ex partner: Not on file     Emotionally abused: Not on file     Physically abused: Not on file     Forced sexual activity: Not on file   Other Topics Concern    Not on file   Social History Narrative    Not on file       Family History:     Family History   Problem Relation Age of Onset    Cancer Mother     Heart Attack Father         Allergies:   Darvocet [propoxyphene n-acetaminophen]; Other; and Oxycodone-acetaminophen     Review of Systems:   Constitutional: No fevers or chills. No systemic complaints  Head: No headaches  Eyes: No double vision or blurry vision.  No conjunctival Decision Making -Laboratory:   I have independently reviewed/ordered the following labs:    CBC with Differential:   Recent Labs     07/02/20  0625 07/03/20  0652   WBC 4.6 3.8   HGB 8.5* 8.2*   HCT 28.6* 27.9*    186   LYMPHOPCT 15* 17*   MONOPCT 15* 10*     BMP:   Recent Labs     07/02/20  0625 07/03/20  0652    138   K 3.9 3.8   CL 99 101   CO2 27 27   BUN 11 13   CREATININE 0.86 0.82     Hepatic Function Panel:   No results for input(s): PROT, LABALBU, BILIDIR, IBILI, BILITOT, ALKPHOS, ALT, AST in the last 72 hours. No results for input(s): RPR in the last 72 hours. No results for input(s): HIV in the last 72 hours. No results for input(s): BC in the last 72 hours. Lab Results   Component Value Date    MUCUS NOT REPORTED 06/27/2020    RBC 2.97 07/03/2020    RBC 4.39 09/13/2011    TRICHOMONAS NOT REPORTED 06/27/2020    WBC 3.8 07/03/2020    YEAST NOT REPORTED 06/27/2020    TURBIDITY CLEAR 06/27/2020     Lab Results   Component Value Date    CREATININE 0.82 07/03/2020    GLUCOSE 91 07/03/2020    GLUCOSE 99 09/13/2011       Medical Decision Making-Imaging:     EXAMINATION:   CTA OF THE CHEST 6/27/2020 2:08 pm       TECHNIQUE:   CTA of the chest was performed after the administration of intravenous   contrast.  Multiplanar reformatted images are provided for review.  MIP   images are provided for review.  Dose modulation, iterative reconstruction,   and/or weight based adjustment of the mA/kV was utilized to reduce the   radiation dose to as low as reasonably achievable.       COMPARISON:   None.       HISTORY:   ORDERING SYSTEM PROVIDED HISTORY: R/o PE - hemoptysis x4d   TECHNOLOGIST PROVIDED HISTORY:   R/o PE - hemoptysis x4d       FINDINGS:   Pulmonary Arteries: Pulmonary arteries are adequately opacified for   evaluation.  No evidence of intraluminal filling defect to suggest pulmonary   embolism.  Main pulmonary artery is normal in caliber.       Mediastinum: No evidence of mediastinal lymphadenopathy.  The heart and   pericardium demonstrate no acute abnormality.  There is no acute abnormality   of the thoracic aorta.       Lungs/pleura: There are nodular areas of consolidation the right lower lobe. There is trace right-sided effusion. Lara Rory is no pneumothorax.  The   tracheobronchial tree is patent.       Upper Abdomen:  There is cholelithiasis.       Soft Tissues/Bones: No acute bone or soft tissue abnormality. Lara Rory is a   right thyroid lobe nodule measuring 2.4 x 1.6 cm.           Impression   No acute or chronic pulmonary embolism.       Nodular areas of consolidation in the right lower lobe consistent with   pneumonia.  Trace right-sided effusion.           Medical Decision Dudegx-Poaorwqq-Bklqy:     Component Name Value Ref Range   Gram Stain Result 0 to 1 WHITE BLOOD CELLS/LPF     Gram Stain Result 0 to 1 SQUAMOUS EPITHELIAL CELLS/LPF     Gram Stain Result 0 CILIATED EPITHELIAL CELLS/LPF     Gram Stain Result MANY GRAM POSITIVE COCCI     Gram Stain Result MANY YEAST     Culture MODERATE ENTEROBACTER CLOACAE COMPLEX (A)     Culture MODERATE LYUBOV ALBICANS (A)     Specimen Collected on   Sputum - Sputum 6/17/2020 3:15 PM     Organism Antibiotic Method Susceptibility   Enterobacter cloacae complex Amikacin KAITY METHOD <=2: Susceptible    Cefazolin KAITY METHOD >=64: Resistant    Cefepime KAITY METHOD 2: Susceptible    Cefotaxime KATIY METHOD >=64: Resistant    Ceftriaxone KAITY METHOD >=64: Resistant    Cefuroxime KAITY METHOD >=64: Resistant    Ciprofloxacin KAITY METHOD <=0.25: Susceptible    Gentamicin KAITY METHOD <=1: Susceptible    IMIPENEM KAITY METHOD <=0.25: Susceptible    Levofloxacin KAITY METHOD <=0.12: Susceptible    Meropenem KAITY METHOD <=0.25: Susceptible    PIPERACIL/TAZOBACTAM KAITY METHOD >=128: Resistant    Tobramycin KAITY METHOD <=1: Susceptible    TRIMETH/SULFAMETHOXAZOLE KAITY METHOD <=1/19: Susceptible    Ertapenem KAITY METHOD 4: Resistant    MULTIDRUG RESISTANCE/MDRO KAITY METHOD POSITIVE Comment:   NON CARABPENEM PRODUCING  CARBAPENEM RESISTANT  ENTEROBACTERIACAE  NO CARBAPENEMASE DETECTED       Medical Decision Making-Other:     Note:  · Labs, medications, radiologic studies were reviewed with personal review of films  · Large amounts of data were reviewed  · Discussed with nursing Staff, Discharge planner  · Infection Control and Prevention measures reviewed  · All prior entries were reviewed  · Administer medications as ordered  · Prognosis: Guarded  · Discharge planning reviewed  · Follow up as outpatient. Thank you for allowing us to participate in the care of this patient. Please call with questions. Heavenly Powers, [address], [place]  7/3/2020, 8:27 AM       ATTESTATION:    I have discussed the case, including pertinent history and exam findings with the residents and students. I have seen and examined the patient and the key elements of the encounter have been performed by me. I was present when the student obtained his information or examined the patient. I have reviewed the laboratory data, other diagnostic studies and discussed them with the residents. I have updated the medical record where necessary. I agree with the assessment, plan and orders as documented by the resident/ student.     Boris Cleveland MD.

## 2020-07-03 NOTE — DISCHARGE INSTR - COC
Continuity of Care Form    Patient Name: Tray Ascencio   :  1945  MRN:  [id number]    Admit date:  2020  Discharge date:  2020    Code Status Order: Full Code   Advance Directives:   Advance Care Flowsheet Documentation     Date/Time Healthcare Directive Type of Healthcare Directive Copy in [address] Agent's Name Healthcare Agent's Phone Number    20 [phone]  No, patient does not have an advance directive for healthcare treatment -- -- -- -- --          Admitting Physician:  Elian Lester MD  PCP: Alissa Mcgrath PA-C    Discharging Nurse: Garett Norris [facility] Unit/Room#: 1831/8958-04  Discharging Unit Phone Number: [phone]    Emergency Contact:   Extended Emergency Contact Information  Primary Emergency Contact: Marti  Address: [address] [place] [address] Phone: [phone]  Work Phone: [phone]  Mobile Phone: [phone]  Relation: Spouse  Secondary Emergency Contact: Riley Yip  Address: [address] Phone: [phone]  Work Phone: [phone]  Mobile Phone: [phone]  Relation: Child    Past Surgical History:  Past Surgical History:   Procedure Laterality Date    CARPAL TUNNEL RELEASE      bilateral    COLONOSCOPY      CYSTOSCOPY  2019    by Dr. Cesia Serrano N/A 2019    CYSTOSCOPY performed by Winnie Alicia MD at [address]    first knuckle right index finger   [address]    vertical banded gastroplasty   [address] REPLACEMENT  2004 & 2005    bilateral knees    OK EGD [address] [facility] [address] N/A 2018    EGD FOREIGN BODY REMOVAL performed by Tamela Martínez MD at [address] EGD TRANSORAL BIOPSY SINGLE/MULTIPLE N/A 2018    EGD BIOPSY performed by Sheila Linda DO at [address]      left shoulder    UPPER GASTROINTESTINAL ENDOSCOPY  08/13/2018    removal of food bolus    UPPER GASTROINTESTINAL ENDOSCOPY N/A 8/13/2018    EGD FOREIGN BODY REMOVAL performed by Bartolome Graham DO at [address] [facility] N/A 8/13/2018    EGD BIOPSY performed by Bartolome Graham DO at [address] [facility]  08/16/2018    egd with balloon dilitation    UPPER GASTROINTESTINAL ENDOSCOPY  8/16/2018    EGD DILATION BALLOON performed by Jon Duvall MD at [address] [facility] N/A 10/1/2018    EGD BIOPSY performed by Dung Schroeder MD at [address] [place] [address]  10/1/2018    EGD DILATION BALLOON performed by Dung Schroeder MD at [address] [place] [address] N/A 11/19/2018    EGD DILATION BALLOON performed [facility]ung [facility] at [facility]       Immunization History:   Immunization History   Administered Date(s) Administered    Influenza Vaccine, unspecified formulation 01/02/2015    Influenza Virus Vaccine 01/02/2015    Influenza, [place], IM, PF (6 mo and older Fluzone, Flulaval, Fluarix, and 3 yrs and older Afluria) 09/28/2019    Pneumococcal Conjugate 13-valent ([id number]) 08/17/2018    Pneumococcal Polysaccharide ([id number]) 10/31/2019    Tdap (Boostrix, Adacel) 04/22/2019       Active Problems:  Patient Active Problem List   Diagnosis Code    Atrial fibrillation (HCC) I48.91    Hyperlipidemia E78.5    GERD (gastroesophageal reflux disease) K21.9    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    OA (osteoarthritis) of knee, bilateral M17.10    Hallux valgus, acquired, bilateral M20.11, M20.12    Hypertension I10    Slow transit constipation K59.01    MARIAMA on CPAP G47.33, Z99.89    Class 2 severe obesity due to excess calories with serious comorbidity and body mass index (BMI) of 36.0 to 36.9 in adult ([facility]) E66.01, Z68.36    Simple chronic bronchitis ([facility]) J41.0    Hospital-acquired sent with patient):  Glasses, Hearing Aides bilateral, Dentures upper and lower    RN SIGNATURE:  Electronically signed by Leanne Guy RN on 7/3/20 at 3:54 PM EDT    CASE MANAGEMENT/SOCIAL WORK SECTION    Inpatient Status Date: ***    Readmission Risk Assessment Score:  Readmission Risk              Risk of Unplanned Readmission:        15           Discharging to Facility/ Agency   Name:   [address] [facility],  [place]  [address]       Phone: [phone]       Fax: [phone]        · Home with [employer] IV antibiotic delivery. ·     Dialysis Facility (if applicable)   · Name:  · Address:  · Dialysis Schedule:  · Phone:  · Fax:    / signature: Electronically signed by Enoc Huston RN on 7/3/20 at 5:50 PM EDT    PHYSICIAN SECTION    Prognosis: Good    Condition at Discharge: Stable    Rehab Potential (if transferring to Rehab): Good    Recommended Labs or Other Treatments After Discharge: None    Physician Certification: I certify the above information and transfer of Matthew Yanes  is necessary for the continuing treatment of the diagnosis listed and that he requires Home Care for less 30 days.      Update Admission H&P: No change in H&P    PHYSICIAN SIGNATURE:  Electronically signed by Raj Mazariegos MD on 7/3/20 at 1:32 PM EDT

## 2020-07-03 NOTE — PLAN OF CARE
Problem: Falls - Risk of:  Goal: Will remain free from falls  Description: Will remain free from falls  7/3/2020 0655 by Kj Gamboa RN  Outcome: Ongoing  7/3/2020 0447 by Laisha Astudillo RN  Outcome: Ongoing  Goal: Absence of physical injury  Description: Absence of physical injury  7/3/2020 0655 by Kj Gamboa RN  Outcome: Ongoing  7/3/2020 0447 by Laisha Astudillo RN  Outcome: Ongoing     Problem: Skin Integrity:  Goal: Will show no infection signs and symptoms  Description: Will show no infection signs and symptoms  7/3/2020 0655 by Kj Gamboa RN  Outcome: Ongoing  7/3/2020 0447 by Laisha Astudillo RN  Outcome: Ongoing  Goal: Absence of new skin breakdown  Description: Absence of new skin breakdown  7/3/2020 0655 by Kj Gamboa RN  Outcome: Ongoing  7/3/2020 0447 by Laisha Astudillo RN  Outcome: Ongoing     Problem: Pain:  Goal: Pain level will decrease  Description: Pain level will decrease  7/3/2020 0655 by Kj Gamboa RN  Outcome: Ongoing  7/3/2020 0447 by Laisha Astudillo RN  Outcome: Ongoing  Goal: Control of acute pain  Description: Control of acute pain  7/3/2020 0655 by Kj Gamboa RN  Outcome: Ongoing  7/3/2020 0447 by Laisha Astudillo RN  Outcome: Ongoing  Goal: Control of chronic pain  Description: Control of chronic pain  7/3/2020 0655 by Kj Gamboa RN  Outcome: Ongoing  7/3/2020 0447 by Laisha Astudillo RN  Outcome: Ongoing     Problem: Musculor/Skeletal Functional Status  Goal: Highest potential functional level  7/3/2020 0655 by Kj Gamboa RN  Outcome: Ongoing  7/3/2020 0447 by Laisha Astudillo RN  Outcome: Ongoing     Problem: Nutrition  Goal: Optimal nutrition therapy  7/3/2020 0655 by Kj Gamboa RN  Outcome: Ongoing  7/3/2020 0447 by Laisha Astudillo RN  Outcome: Ongoing

## 2020-07-03 NOTE — DISCHARGE SUMMARY
[facility] [address]    Discharge Summary     Patient ID: Jaime Taylor  :  1945   MRN: [id number]     ACCOUNT:  [de-identified]   Patient's PCP: Michael Steven PA-C  Admit Date: 2020   Discharge Date: 7/3/2020    Length of Stay: 6  Code Status:  Full Code  Admitting Physician: Mayra Miller MD  Discharge Physician: Janki Eisenberg MD     Active Discharge Diagnoses:     Hospital Problem Lists:  Principal Problem:    Hospital-acquired bacterial pneumonia  Active Problems:    Slow transit constipation    Simple chronic bronchitis (HCC)    Atrial fibrillation ([address]    Hyperlipidemia    GERD (gastroesophageal reflux disease)    Hypertension    MARIAMA on CPAP    Class 2 severe obesity due to excess calories with serious comorbidity and body mass index (BMI) of 36.0 to 36.9 in adult [facility])    Fluid overload  Resolved Problems:    Acute on chronic diastolic heart failure [facility])      Admission Condition:  fair     Discharged Condition: stable    Hospital Stay:     Hospital Course:  Jaime Taylor is a 69 y.o. male who was admitted for the management of Hospital-acquired bacterial pneumonia , presented to ER with Shortness of Breath and Cough    1. Hospital-acquired bacterial pneumonia- patient presents with hemoptysis and cough. On 2020, patient had isolation of MDR Enterobacter from sputum. Patient currently on meropenem and Cipro. Infectious disease on board. Patient to be on meropenem until 2020. Patient to be on p.o. Cipro until 2020. We will set up home health for patient to receive the rest of his IV Merrem on discharge. Patient to get midline placed on 2020. Patient discharged on Merrem and Cipro. 2. Acute on chronic diastolic heart failure- he does have a history of heart failure. Patient noted to have lower extremity swelling and bibasilar crackles. Patient will be placed on Bumex 1 mg IV twice daily.   Patient's fluid status will be reevaluated. Daily weights, strict I's and O's. On discharge, patient not appreciated to have any bibasilar crackles. Patient discharged on Bumex twice a day x1 week. Patient to resume his regimen with Bumex after 1 week. 3. Fluid overload-patient states that he was receiving IV fluids when he was hospitalized at [place] prior to this admission. Patient states he was there for 12 days. Patient most likely with fluid overload secondary to IV fluids given at that time. Patient given Bumex 1 mg IV twice a day to help mobilize fluid. Patient discharged on Bumex 1 mg p.o. twice a day x1 week. 4. Atrial fibrillation-patient with history of atrial fibrillation. Patient will be continued on Xarelto. 5. Hyperlipidemia-patient with history of hyperlipidemia. Patient does not appear to be on any cholesterol-lowering medications. 6. GERD- patient with history of GERD. Patient be continued on Protonix. 7. Hypertension-patient with history of hypertension.   Patient to be continued on diuretic.  8. Obesity- BMI 32.2    Significant Diagnostic Studies:   Labs / Micro:  CBC:   Lab Results   Component Value Date    WBC 3.8 07/03/2020    RBC 2.97 07/03/2020    RBC 4.39 09/13/2011    HGB 8.2 07/03/2020    HCT 27.9 07/03/2020    MCV 93.9 07/03/2020    MCH 27.6 07/03/2020    MCHC 29.4 07/03/2020    RDW 24.8 07/03/2020     07/03/2020     09/13/2011     BMP:    Lab Results   Component Value Date    GLUCOSE 91 07/03/2020    GLUCOSE 99 09/13/2011     07/03/2020    K 3.8 07/03/2020     07/03/2020    CO2 27 07/03/2020    ANIONGAP 10 07/03/2020    BUN 13 07/03/2020    CREATININE 0.82 07/03/2020    BUNCRER NOT REPORTED 07/03/2020    CALCIUM 8.3 07/03/2020    LABGLOM >60 07/03/2020    GFRAA >60 07/03/2020    GFR      07/03/2020    GFR NOT REPORTED 07/03/2020     HFP:    Lab Results   Component Value Date    PROT 6.8 06/27/2020     CMP:    Lab Results   Component Value Date    GLUCOSE 91 07/03/2020    GLUCOSE 99 09/13/2011     07/03/2020    K 3.8 07/03/2020     07/03/2020    CO2 27 07/03/2020    BUN 13 07/03/2020    CREATININE 0.82 07/03/2020    ANIONGAP 10 07/03/2020    ALKPHOS 150 06/27/2020    ALT 33 06/27/2020    AST 32 06/27/2020    BILITOT 1.09 06/27/2020    LABALBU 3.4 06/27/2020    ALBUMIN 1.0 06/27/2020    LABGLOM >60 07/03/2020    GFRAA >60 07/03/2020    GFR      07/03/2020    GFR NOT REPORTED 07/03/2020    PROT 6.8 06/27/2020    CALCIUM 8.3 07/03/2020     PT/INR:    Lab Results   Component Value Date    PROTIME 12.3 06/27/2020    PROTIME 16.6 08/16/2018    INR 1.2 06/27/2020     PTT:   Lab Results   Component Value Date    APTT 32.4 06/27/2020     FLP:    Lab Results   Component Value Date    CHOL 157 07/06/2018    CHOL 129 10/30/2013    TRIG 101 07/06/2018    HDL 62 06/24/2019     U/A:    Lab Results   Component Value Date    COLORU YELLOW 06/27/2020    TURBIDITY CLEAR 06/27/2020    SPECGRAV 1.006 06/27/2020    HGBUR TRACE 06/27/2020    PHUR 8.5 06/27/2020    PROTEINU NEGATIVE 06/27/2020    GLUCOSEU NEGATIVE 06/27/2020    KETUA NEGATIVE 06/27/2020    BILIRUBINUR NEGATIVE 06/27/2020    BILIRUBINUR - 03/05/2020    UROBILINOGEN Normal 06/27/2020    NITRU NEGATIVE 06/27/2020    LEUKOCYTESUR TRACE 06/27/2020     TSH:    Lab Results   Component Value Date    TSH 2.74 01/04/2016       Radiology:  Xr Chest Standard (2 Vw)    Result Date: 6/28/2020  1. Multifocal pneumonia primarily at the right lung base, increased from the prior study. Small bilateral pleural effusions. Follow-up is recommended to document resolution. 2. Stable moderate elevation of the right hemidiaphragm. Xr Chest Standard (2 Vw)    Result Date: 6/27/2020  Subtle right basilar infiltrate representing atelectasis versus pneumonia. Ct Chest Pulmonary Embolism W Contrast    Result Date: 6/27/2020  No acute or chronic pulmonary embolism.  Nodular areas of consolidation in the right lower lobe consistent with pneumonia. Trace right-sided effusion. Consultations:    Consults:     Final Specialist Recommendations/Findings:   IP CONSULT TO INFECTIOUS DISEASES      The patient was seen and examined on day of discharge and this discharge summary is in conjunction with any daily progress note from day of discharge. Discharge plan:     Disposition: Home    Physician Follow Up:     Fabiola Morris PA-C  [address] [facility]  [phone]           Diet: cardiac diet    Activity: As tolerated    Discharge Medications:      Medication List      START taking these medications    ciprofloxacin 750 MG tablet  Commonly known as:  CIPRO  Take 1 tablet by mouth every 12 hours for 11 days     meropenem  infusion  Commonly known as:  MERREM  Infuse 1,000 mg intravenously every 8 hours for 4 days Compound per protocol.         CHANGE how you take these medications    bumetanide 1 MG tablet  Commonly known as:  BUMEX  From 7/3 to 7/10 take Bumex 1 mg twice a day and then resume as prescribed prior  Take 2 tablets on MWF, 1 tablet on all other days  What changed:  additional instructions     metoprolol succinate 25 MG extended release tablet  Commonly known as:  TOPROL XL  Take 1 tablet by mouth 2 times daily  What changed:  when to take this        CONTINUE taking these medications    albuterol 2 MG tablet  Commonly known as:  PROVENTIL  Use as needed     Armodafinil 200 MG Tabs     Breo Ellipta 200-25 MCG/INH Aepb inhaler  Generic drug:  Fluticasone furoate-vilanterol     cetirizine 10 MG tablet  Commonly known as:  ZYRTEC  TAKE 1 TABLET BY MOUTH DAILY     D-Mannose 500 MG Caps  Take 500 mg by mouth 3 times daily     fluticasone 50 MCG/ACT nasal spray  Commonly known as:  FLONASE  2 sprays by Nasal route daily     gabapentin 300 MG capsule  Commonly known as:  NEURONTIN  TAKE 1 CAPSULE BY MOUTH THREE TIMES DAILY     guaiFENesin 600 MG extended release tablet  Commonly known as:  SM Mucus Relief  Take 1 tablet by mouth 2 times daily     Handicap Placard Misc  by Does not apply route     Incontinence Brief Large Misc  To use as directed. Use 3x a day     ipratropium-albuterol 0.5-2.5 (3) MG/3ML Soln nebulizer solution  Commonly known as:  DUONEB     Multi-Vitamins Tabs  TAKE 1 TABLET BY MOUTH DAILY     pantoprazole 40 MG tablet  Commonly known as:  PROTONIX  TAKE 1 TABLET BY MOUTH DAILY     polyethylene glycol 17 GM/SCOOP powder  Commonly known as:  GLYCOLAX  Take 17 g by mouth daily     potassium chloride 20 MEQ extended release tablet  Commonly known as:  KLOR-CON M  Take 1 tablet by mouth 2 times daily     rivaroxaban 20 MG Tabs tablet  Commonly known as:  Xarelto  TAKE 1 TABLET BY MOUTH DAILY     rOPINIRole 0.25 MG tablet  Commonly known as:  REQUIP  TAKE 1 TABLET BY MOUTH TWICE DAILY     SM Lubricant Eye Drops 0.4-0.3 % ophthalmic solution  Generic drug:  polyethyl glycol-propyl glycol 0.4-0.3 %  PLACE 1 DROP INTO BOTH EYES FOUR TIMES DAILY AS NEEDED FOR DRY EYES     Tri-Balance Orthotics Mens Misc  Provide insurance covered orthotics shoes, wear daily     vitamin B-12 100 MCG tablet  Commonly known as:  CYANOCOBALAMIN  TAKE 1 TABLET BY MOUTH DAILY AS NEEDED FOR FATIGUE        STOP taking these medications    spironolactone 25 MG tablet  Commonly known as:  ALDACTONE           Where to Get Your Medications      These medications were sent to [address] [place] [address]  [address]    Phone:  [phone]   · ciprofloxacin 750 MG tablet  · metoprolol succinate 25 MG extended release tablet     You can get these medications from any pharmacy    Bring a paper prescription for each of these medications  · bumetanide 1 MG tablet     Information about where to get these medications is not yet available    Ask your nurse or doctor about these medications  · meropenem  infusion         No discharge procedures on file.     Time Spent on discharge is  35 mins in patient examination, evaluation, counseling as well as medication reconciliation, prescriptions for required medications, discharge plan and follow up. Electronically signed by   Raj Mazariegos MD  7/3/2020  1:11 PM      Thank you Dr. Eileen Gary PA-C for the opportunity to be involved in this patient's care.

## 2020-07-03 NOTE — CARE COORDINATION
[facility]  Written by: Shanna Carpenter RN    Patient Name: Vila Opitz  Attending Provider: Francisco Toro MD  Admit Date: 2020 11:19 AM  MRN: [id number]  Account: [de-identified]                     : 1945  Discharge Date: 7/3/2020      Disposition: home with wife and [place]. Has [facility] for IV antibiotics.     Shanna Carpenter RN

## 2020-07-03 NOTE — PLAN OF CARE
Problem: RESPIRATORY  Intervention: Respiratory assessment  7/3/2020 0824 by Mina Rocha RCP  Note: BRONCHOSPASM/BRONCHOCONSTRICTION     [x]         IMPROVE AERATION/BREATH SOUNDS  [x]   ADMINISTER BRONCHODILATOR THERAPY AS APPROPRIATE  [x]   ASSESS BREATH SOUNDS  []   IMPLEMENT AEROSOL/MDI PROTOCOL  [x]   PATIENT EDUCATION AS NEEDED       PROVIDE ADEQUATE OXYGENATION WITH ACCEPTABLE SP02/ABG'S    [x]  IDENTIFY APPROPRIATE OXYGEN THERAPY  [x]   MONITOR SP02/ABG'S AS NEEDED   [x]   PATIENT EDUCATION AS NEEDED       NON INVASIVE VENTILATION  PROVIDE OPTIMAL VENTILATION/ACCEPTABLE SP02  IMPLEMENT NON INVASIVE VENTILATION PROTOCOL  ASSESSMENT SKIN INTEGRITY  PATIENT EDUCATION AS NEEDED  BIPAP AS NEEDED

## 2020-07-03 NOTE — PROGRESS NOTES
Physical Therapy  Facility/Department: [facility] ONC/MED SURG  Daily Treatment Note  NAME: Abraham Bobo  : 1945  MRN: [id number]    Date of Service: 7/3/2020    Discharge Recommendations:  Patient would benefit from continued therapy after discharge   PT Equipment Recommendations  Equipment Needed: No    Assessment   Body structures, Functions, Activity limitations: Decreased functional mobility ; Decreased cognition;Decreased endurance;Decreased strength;Decreased balance  Assessment: Pt completing functional mobility without physical assistance and demos good safety awareness. Would benefit from continued PT at home to maximize functional outcomes. Prognosis: Good  PT Education: General Safety;Home Exercise Program;Gait Training  REQUIRES PT FOLLOW UP: Yes  Activity Tolerance  Activity Tolerance: Patient Tolerated treatment well;Patient limited by endurance     Patient Diagnosis(es): The encounter diagnosis was Pneumonia due to organism.      has a past medical history of Acute superficial gastritis without hemorrhage, Anastomotic stricture of stomach, Asthma, Atrial fibrillation ([address] Calculus of gallbladder without cholecystitis without obstruction, Chronic diastolic heart failure (HCC), Chronic rhinosinusitis, Class 2 severe obesity due to excess calories with serious comorbidity and body mass index (BMI) of 36.0 to 36.9 in adult ([address] E. coli septicemia ([address] E. coli UTI, Foot drop, right, Fracture of femur ([address] Gait disorder, Gastric out let obstruction, GERD (gastroesophageal reflux disease), Hallux valgus, acquired, bilateral, Hyperlipidemia, Hypertension, Impaired hearing, Internal hemorrhoids, Lymphedema of both lower extremities, Nausea & vomiting, OA (osteoarthritis) of knee, bilateral, Obesity, MARIAMA on CPAP, Osteoarthritis, Osteoarthritis of lumbar spine, S/P revision of total knee, Septicemia due to E. coli ([address] Simple chronic bronchitis ([address] Slow transit constipation, Unspecified sleep

## 2020-07-09 ENCOUNTER — TELEPHONE (OUTPATIENT)
Dept: INFECTIOUS DISEASES | Age: 75
End: 2020-07-09

## 2020-07-09 NOTE — TELEPHONE ENCOUNTER
Naren Muniz MD   7/9/2020 9:59 AM   Kemi Bauer from [facility] calling to find out if Mid line can be pulled on Nora Anchors [birth year] who was treated for RLL pneumonia with MDRO Enterobacter with :  · Continue Cipro 750 mg po BID. Stop date 7-14-20  · Continue meropenem 1 gm IV q 8 hr. Stop date 7-7-20  [facility] Read 7/9/2020 10:06 AM       7/9/2020 10:06 AM   Yes pull it    Letter sent to [facility].   [phone]

## 2020-07-16 ENCOUNTER — OFFICE VISIT (OUTPATIENT)
Dept: PRIMARY CARE CLINIC | Age: 75
End: 2020-07-16
Payer: MEDICARE

## 2020-07-16 VITALS
HEART RATE: 74 BPM | OXYGEN SATURATION: 97 % | TEMPERATURE: 97.3 F | DIASTOLIC BLOOD PRESSURE: 58 MMHG | BODY MASS INDEX: 29.41 KG/M2 | SYSTOLIC BLOOD PRESSURE: 101 MMHG | WEIGHT: 205 LBS

## 2020-07-16 PROCEDURE — 1111F DSCHRG MED/CURRENT MED MERGE: CPT | Performed by: PHYSICIAN ASSISTANT

## 2020-07-16 PROCEDURE — 99214 OFFICE O/P EST MOD 30 MIN: CPT | Performed by: PHYSICIAN ASSISTANT

## 2020-07-16 RX ORDER — SPIRONOLACTONE 25 MG/1
25 TABLET ORAL DAILY
Qty: 28 TABLET | Refills: 3 | Status: ON HOLD
Start: 2020-07-16 | End: 2020-10-16 | Stop reason: HOSPADM

## 2020-07-16 RX ORDER — METOPROLOL SUCCINATE 25 MG/1
25 TABLET, EXTENDED RELEASE ORAL 2 TIMES DAILY
Qty: 60 TABLET | Refills: 3 | Status: SHIPPED | OUTPATIENT
Start: 2020-07-16 | End: 2020-12-03 | Stop reason: DRUGHIGH

## 2020-07-16 RX ORDER — DIPHENOXYLATE HYDROCHLORIDE AND ATROPINE SULFATE 2.5; .025 MG/1; MG/1
1 TABLET ORAL DAILY
Qty: 28 TABLET | Refills: 5 | Status: SHIPPED | OUTPATIENT
Start: 2020-07-16 | End: 2020-12-03 | Stop reason: SDUPTHER

## 2020-07-16 RX ORDER — POTASSIUM CHLORIDE 20 MEQ/1
20 TABLET, EXTENDED RELEASE ORAL 2 TIMES DAILY
Qty: 56 TABLET | Refills: 5 | Status: SHIPPED | OUTPATIENT
Start: 2020-07-16 | End: 2020-12-03 | Stop reason: SDUPTHER

## 2020-07-16 RX ORDER — PANTOPRAZOLE SODIUM 40 MG/1
40 TABLET, DELAYED RELEASE ORAL DAILY
Qty: 28 TABLET | Refills: 3 | Status: SHIPPED | OUTPATIENT
Start: 2020-07-16 | End: 2020-11-05 | Stop reason: SDUPTHER

## 2020-07-16 ASSESSMENT — ENCOUNTER SYMPTOMS
COUGH: 1
CONSTIPATION: 1

## 2020-07-16 NOTE — PROGRESS NOTES
Post-Discharge Transitional Care Management Services or Hospital Follow Up      Kelly Carrillo   YOB: 1945    Date of Office Visit:  7/16/2020  Date of Hospital Admission: 6/27/20  Date of Hospital Discharge: 7/3/20  Readmission Risk Score(high >=14%. Medium >=10%):Readmission Risk Score: 15      Care management risk score Rising risk (score 2-5) and Complex Care (Scores >=6): 7     Non face to face  following discharge, date last encounter closed (first attempt may have been earlier): *No documented post hospital discharge outreach found in the last 14 days *No documented post hospital discharge outreach found in the last 14 days    Call initiated 2 business days of discharge: *No response recorded in the last 14 days     Patient Active Problem List   Diagnosis    At[facility]    Hyperlipidemia    GERD (gastroesophageal reflux disease)    Osteoarthritis of lumbar spine    OA (osteoarthritis) of knee, bilateral    Hallux valgus, acquired, bilateral    Hypertension    Slow transit constipation    MARIAMA on CPAP    Class 2 severe obesity due to excess calories with serious comorbidity and body mass index (BMI) of 36.0 to 36.9 in adult ([address]    Simple chronic bronchitis ([address]    Hospital-acquired bacterial pneumonia    Fluid overload       Allergies   Allergen Reactions    Darvocet [Propoxyphene N-Acetaminophen] Hives    Other Hives    Oxycodone-Acetaminophen        Medications listed as ordered at the time of discharge from [facility] Medication Instructions JOE:    Printed on:07/23/20 2008   Medication Information                      albuterol (PROVENTIL) 2 MG tablet  Use as needed             Armodafinil 200 MG TABS  Take 1 tablet by mouth 2 times daily.               bumetanide (BUMEX) 1 MG tablet  TAKE 2 TABLETS BY MOUTH ON MONDAY, WEDNESDAY, AND FRIDAY, TAKE 1 TABLET ON ALL OTHER DAYS             cetirizine (ZYRTEC) 10 MG tablet  TAKE 1 TABLET BY MOUTH DAILY             D-Mannose 500 MG CAPS  Take 500 mg by mouth 3 times daily             fluticasone (FLONASE) 50 MCG/ACT nasal spray  2 sprays by Nasal route daily             Fluticasone furoate-vilanterol (BREO ELLIPTA) 200-25 MCG/INH AEPB inhaler  Inhale 1 puff into the lungs daily             Foot Care Products (TRI-BALANCE ORTHOTICS MENS) MISC  Provide insurance covered orthotics shoes, wear daily             gabapentin (NEURONTIN) 300 MG capsule  TAKE 1 CAPSULE BY MOUTH THREE TIMES DAILY             guaiFENesin (SM MUCUS RELIEF) 600 MG extended release tablet  Take 1 tablet by mouth 2 times daily             Handicap Placard MISC  by Does not apply route             Incontinence Supply Disposable (INCONTINENCE BRIEF LARGE) MISC  To use as directed.  Use 3x a day             ipratropium-albuterol (DUONEB) 0.5-2.5 (3) MG/3ML SOLN nebulizer solution  Inhale 1 vial into the lungs every 4 hours as needed              metoprolol succinate (TOPROL XL) 25 MG extended release tablet  Take 1 tablet by mouth 2 times daily             Multiple Vitamin (MULTI-VITAMINS) TABS  Take 1 tablet by mouth daily             pantoprazole (PROTONIX) 40 MG tablet  Take 1 tablet by mouth daily             polyethylene glycol (GLYCOLAX) powder  Take 17 g by mouth daily             potassium chloride (KLOR-CON M) 20 MEQ extended release tablet  Take 1 tablet by mouth 2 times daily             rivaroxaban (XARELTO) 20 MG TABS tablet  Take 1 tablet by mouth daily             rOPINIRole (REQUIP) 0.25 MG tablet  TAKE 1 TABLET BY MOUTH TWICE DAILY             SM LUBRICANT EYE DROPS 0.4-0.3 % ophthalmic solution  PLACE 1 DROP INTO BOTH EYES FOUR TIMES DAILY AS NEEDED FOR DRY EYES             spironolactone (ALDACTONE) 25 MG tablet  Take 1 tablet by mouth daily             tiZANidine (ZANAFLEX) 4 MG tablet  TAKE 1 TABLET BY MOUTH DAILY AS NEEDED             vitamin B-12 (CYANOCOBALAMIN) 100 MCG tablet  TAKE 1 TABLET BY MOUTH DAILY AS NEEDED FOR FATIGUE                   Medications marked \"taking\" at this time  Outpatient Medications Marked as Taking for the 7/16/20 encounter (Office Visit) with Mena Lyn PA-C   Medication Sig Dispense Refill    spironolactone (ALDACTONE) 25 MG tablet Take 1 tablet by mouth daily 28 tablet 3    metoprolol succinate (TOPROL XL) 25 MG extended release tablet Take 1 tablet by mouth 2 times daily 60 tablet 3    rivaroxaban (XARELTO) 20 MG TABS tablet Take 1 tablet by mouth daily 28 tablet 3    pantoprazole (PROTONIX) 40 MG tablet Take 1 tablet by mouth daily 28 tablet 3    potassium chloride (KLOR-CON M) 20 MEQ extended release tablet Take 1 tablet by mouth 2 times daily 56 tablet 5    Multiple Vitamin (MULTI-VITAMINS) TABS Take 1 tablet by mouth daily 28 tablet 5    [DISCONTINUED] bumetanide (BUMEX) 1 MG tablet From 7/3 to 7/10 take Bumex 1 mg twice a day and then resume as prescribed prior  Take 2 tablets on MWF, 1 tablet on all other days 56 tablet 2    cetirizine (ZYRTEC) 10 MG tablet TAKE 1 TABLET BY MOUTH DAILY 28 tablet 5    [DISCONTINUED] gabapentin (NEURONTIN) 300 MG capsule TAKE 1 CAPSULE BY MOUTH THREE TIMES DAILY 84 capsule 0    fluticasone (FLONASE) 50 MCG/ACT nasal spray 2 sprays by Nasal route daily 16 g 3    guaiFENesin (SM MUCUS RELIEF) 600 MG extended release tablet Take 1 tablet by mouth 2 times daily 60 tablet 3    SM LUBRICANT EYE DROPS 0.4-0.3 % ophthalmic solution PLACE 1 DROP INTO BOTH EYES FOUR TIMES DAILY AS NEEDED FOR DRY EYES 15 mL 3    Fluticasone furoate-vilanterol (BREO ELLIPTA) 200-25 MCG/INH AEPB inhaler Inhale 1 puff into the lungs daily      Handicap Placard MISC by Does not apply route 1 each 0    Incontinence Supply Disposable (INCONTINENCE BRIEF LARGE) MISC To use as directed.  Use 3x a day 90 each 3    polyethylene glycol (GLYCOLAX) powder Take 17 g by mouth daily 527 g 3    Foot Care Products (TRI-BALANCE ORTHOTICS MENS) MISC Provide insurance covered orthotics shoes, wear daily 2 each 0    albuterol (PROVENTIL) 2 MG tablet Use as needed 1 tablet 0    ipratropium-albuterol (DUONEB) 0.5-2.5 (3) MG/3ML SOLN nebulizer solution Inhale 1 vial into the lungs every 4 hours as needed       Armodafinil 200 MG TABS Take 1 tablet by mouth 2 times daily. Medications patient taking as of now reconciled against medications ordered at time of hospital discharge: Yes    Chief Complaint   Patient presents with    Follow-up    Follow-Up from Hospital     dc 6/29 Pneuomina    Cough       HPI    Inpatient course: Discharge summary reviewed- see chart. Interval history/Current status: Patient is here for hospital follow-up for pneumonia. Patient wife is present. Patient states he is compliant with medications. Patient went in the ER for worsening shortness of breath, patient was diagnosed with pneumonia, patient was admitted for 12 days. Patient symptoms improved and was discharged from the hospital but went back to the ER for worsening symptoms and was readmitted for pneumonia again, second hospital admission was for 6 days. Patient was discharged from the hospital required IV antibiotics and oral Cipro which she has discontinued. Patient is scheduled to follow-up with pulmonology in 8/2020. Informed patient will order chest x-ray for patient to get in the next week to reevaluate lungs, patient voiced understanding. Patient voiced concern with medication change, was stopped on spironolactone while in the hospital.  Upon reviewing hospital admission, will restart patient on Spironolactone. Patient voiced you were seen by cardiology and urology this year, informed patient will obtain records. Patient blood pressure is controlled in office. Patient weight is stable. Patient requesting additional medication refills. Labs reviewed. Health maintenance reviewed.     Medications reviewed, refilled Metoprolol tartrate 25 mg, multivitamin, Protonix 40 mg, potassium 20 MEQ, Xarelto 20 mg, spironolactone 25 mg. Review of Systems   Constitutional: Negative for chills and fever. HENT: Negative for congestion. Respiratory: Positive for cough. Negative for shortness of breath and wheezing. Cardiovascular: Negative for chest pain. Gastrointestinal: Positive for constipation. Negative for diarrhea, nausea and vomiting. Genitourinary: Negative for dysuria, frequency and urgency. Musculoskeletal: Negative for back pain, myalgias and neck pain. Neurological: Negative for dizziness, light-headedness and headaches. Vitals:    07/16/20 1456   BP: (!) 101/58   Site: Left Upper Arm   Position: Sitting   Cuff Size: Medium Adult   Pulse: 74   Temp: 97.3 °F (36.3 °C)   SpO2: 97%   Weight: 205 lb (93 kg)     Body mass index is 29.41 kg/m². Wt Readings from Last 3 Encounters:   07/16/20 205 lb (93 kg)   07/03/20 221 lb (100.2 kg)   03/05/20 207 lb 12.8 oz (94.3 kg)     BP Readings from Last 3 Encounters:   07/16/20 (!) 101/58   07/03/20 (!) 99/55   03/05/20 111/68       Physical Exam  Vitals signs reviewed. Constitutional:       Appearance: Normal appearance. He is well-developed, well-groomed and overweight. HENT:      Head: Normocephalic and atraumatic. Right Ear: External ear normal.      Left Ear: External ear normal.      Nose: Nose normal.   Eyes:      General: Lids are normal.      Conjunctiva/sclera: Conjunctivae normal.      Pupils: Pupils are equal, round, and reactive to light. Cardiovascular:      Rate and Rhythm: Normal rate and regular rhythm. Heart sounds: Normal heart sounds. Pulmonary:      Effort: Pulmonary effort is normal.      Breath sounds: Examination of the right-lower field reveals decreased breath sounds. Decreased breath sounds present. Abdominal:      Palpations: Abdomen is soft. There is no mass. Tenderness: There is no abdominal tenderness.    Musculoskeletal:         General: No

## 2020-07-23 ASSESSMENT — ENCOUNTER SYMPTOMS
WHEEZING: 0
BACK PAIN: 0
DIARRHEA: 0
NAUSEA: 0
VOMITING: 0
SHORTNESS OF BREATH: 0

## 2020-07-27 ENCOUNTER — HOSPITAL ENCOUNTER (OUTPATIENT)
Dept: GENERAL RADIOLOGY | Age: 75
Discharge: HOME | End: 2020-07-29
Payer: MEDICARE

## 2020-07-27 ENCOUNTER — HOSPITAL ENCOUNTER (OUTPATIENT)
Age: 75
Discharge: HOME | End: 2020-07-29
Payer: MEDICARE

## 2020-07-27 PROCEDURE — 71046 X-RAY EXAM CHEST 2 VIEWS: CPT

## 2020-08-19 NOTE — TELEPHONE ENCOUNTER
Health Maintenance   Topic Date Due    Shingles Vaccine (1 of 2) 10/31/1996    Flu vaccine (1) 09/01/2020    Annual Wellness Visit (AWV)  10/31/2020    Potassium monitoring  07/03/2021    Creatinine monitoring  07/03/2021    Colon cancer screen colonoscopy  03/26/2023    Lipid screen  06/24/2024    DTaP/Tdap/Td vaccine (2 - Td) 04/22/2029    Pneumococcal 65+ years Vaccine  Completed    AAA screen  Completed    Hepatitis C screen  Completed    Hepatitis A vaccine  Aged Out    Hepatitis B vaccine  Aged Out    Hib vaccine  Aged Out    Meningococcal (ACWY) vaccine  Aged Out             (applicable per patient's age: Cancer Screenings, Depression Screening, Fall Risk Screening, Immunizations)    LDL Cholesterol (mg/dL)   Date Value   06/24/2019 65     LDL Calculated (mg/dL)   Date Value   10/30/2013 67     AST (U/L)   Date Value   06/27/2020 32     ALT (U/L)   Date Value   06/27/2020 33     BUN (mg/dL)   Date Value   07/03/2020 13      (goal A1C is < 7)   (goal LDL is <100) need 30-50% reduction from baseline     BP Readings from Last 3 Encounters:   07/16/20 (!) 101/58   07/03/20 (!) 99/55   03/05/20 111/68    (goal /80)      All Future Testing planned in [facility]:  Lab Frequency Next Occurrence   Clostridium Difficile Toxin/Antigen Once 05/03/2020       Next Visit Date:  Future Appointments   Date Time Provider Jeri Flynn   11/12/2020  2:40 PM Ngozi Winter PA-C [address]            Patient Active Problem List:     Atrial fibrillation ([facility] [address]     Hyperlipidemia     GERD (gastroesophageal reflux disease)     Osteoarthritis of lumbar spine     OA (osteoarthritis) of knee, bilateral     Hallux valgus, acquired, bilateral     Hypertension     Slow transit constipation     MARIAMA on CPAP     Class 2 severe obesity due to excess calories with serious comorbidity and body mass index (BMI) of 36.0 to 36.9 in adult [facility])     Simple chronic bronchitis (HCC)     Hospital-acquired bacterial pneumonia     Fluid overload

## 2020-08-20 RX ORDER — GABAPENTIN 300 MG/1
CAPSULE ORAL
Qty: 84 CAPSULE | Refills: 1 | Status: SHIPPED | OUTPATIENT
Start: 2020-09-18 | End: 2020-11-05 | Stop reason: SDUPTHER

## 2020-08-20 RX ORDER — ROPINIROLE 0.25 MG/1
TABLET, FILM COATED ORAL
Qty: 56 TABLET | Refills: 2 | Status: SHIPPED | OUTPATIENT
Start: 2020-08-20 | End: 2020-11-05 | Stop reason: SDUPTHER

## 2020-09-17 RX ORDER — BUMETANIDE 1 MG/1
TABLET ORAL
Qty: 40 TABLET | Refills: 2 | Status: SHIPPED | OUTPATIENT
Start: 2020-09-17 | End: 2020-12-03 | Stop reason: ALTCHOICE

## 2020-09-17 NOTE — TELEPHONE ENCOUNTER
Health Maintenance   Topic Date Due    Shingles Vaccine (1 of 2) 10/31/1996    Flu vaccine (1) 09/01/2020    Annual Wellness Visit (AWV)  10/31/2020    Potassium monitoring  07/03/2021    Creatinine monitoring  07/03/2021    Colon cancer screen colonoscopy  03/26/2023    Lipid screen  06/24/2024    DTaP/Tdap/Td vaccine (2 - Td) 04/22/2029    Pneumococcal 65+ years Vaccine  Completed    AAA screen  Completed    Hepatitis C screen  Completed    Hepatitis A vaccine  Aged Out    Hepatitis B vaccine  Aged Out    Hib vaccine  Aged Out    Meningococcal (ACWY) vaccine  Aged Out             (applicable per patient's age: Cancer Screenings, Depression Screening, Fall Risk Screening, Immunizations)    LDL Cholesterol (mg/dL)   Date Value   06/24/2019 65     LDL Calculated (mg/dL)   Date Value   10/30/2013 67     AST (U/L)   Date Value   06/27/2020 32     ALT (U/L)   Date Value   06/27/2020 33     BUN (mg/dL)   Date Value   07/03/2020 13      (goal A1C is < 7)   (goal LDL is <100) need 30-50% reduction from baseline     BP Readings from Last 3 Encounters:   07/16/20 (!) 101/58   07/03/20 (!) 99/55   03/05/20 111/68    (goal /80)      All Future Testing planned in [facility]:  Lab Frequency Next Occurrence   Clostridium Difficile Toxin/Antigen Once 05/03/2020       Next Visit Date:  Future Appointments   Date Time Provider Jeri Flynn   11/12/2020  2:40 PM Michael Steven PA-C [address] [place]            Patient Active Problem List:     Atrial fibrillation ([facility] [address]     Hyperlipidemia     GERD (gastroesophageal reflux disease)     Osteoarthritis of lumbar spine     OA (osteoarthritis) of knee, bilateral     Hallux valgus, acquired, bilateral     Hypertension     Slow transit constipation     MARIAMA on CPAP     Class 2 severe obesity due to excess calories with serious comorbidity and body mass index (BMI) of 36.0 to 36.9 in adult [facility])     Simple chronic bronchitis (HCC)     Hospital-acquired bacterial pneumonia     Fluid overload

## 2020-10-11 ENCOUNTER — APPOINTMENT (OUTPATIENT)
Dept: GENERAL RADIOLOGY | Age: 75
DRG: 178 | End: 2020-10-11
Payer: MEDICARE

## 2020-10-11 ENCOUNTER — HOSPITAL ENCOUNTER (INPATIENT)
Age: 75
LOS: 6 days | Discharge: HOME HEALTH | DRG: 178 | End: 2020-10-17
Attending: EMERGENCY MEDICINE | Admitting: INTERNAL MEDICINE
Payer: MEDICARE

## 2020-10-11 ENCOUNTER — APPOINTMENT (OUTPATIENT)
Dept: CT IMAGING | Age: 75
DRG: 178 | End: 2020-10-11
Payer: MEDICARE

## 2020-10-11 PROBLEM — J44.9 COPD (CHRONIC OBSTRUCTIVE PULMONARY DISEASE) (HCC): Status: ACTIVE | Noted: 2020-10-11

## 2020-10-11 PROBLEM — J18.9 PNEUMONIA: Status: ACTIVE | Noted: 2020-10-11

## 2020-10-11 PROBLEM — I50.32 CHRONIC DIASTOLIC HEART FAILURE (HCC): Status: ACTIVE | Noted: 2020-10-11

## 2020-10-11 PROBLEM — Z98.84 H/O GASTRIC BYPASS: Status: ACTIVE | Noted: 2020-10-11

## 2020-10-11 LAB
-: NORMAL
ABSOLUTE EOS #: 0.09 K/UL (ref 0–0.44)
ABSOLUTE IMMATURE GRANULOCYTE: 0 K/UL (ref 0–0.3)
ABSOLUTE LYMPH #: 0.37 K/UL (ref 1.1–3.7)
ABSOLUTE MONO #: 0.46 K/UL (ref 0.1–1.2)
ALBUMIN SERPL-MCNC: 4 G/DL (ref 3.5–5.2)
ALBUMIN/GLOBULIN RATIO: 1.2 (ref 1–2.5)
ALP BLD-CCNC: 126 U/L (ref 40–129)
ALT SERPL-CCNC: 9 U/L (ref 5–41)
AMORPHOUS: NORMAL
ANION GAP SERPL CALCULATED.3IONS-SCNC: 10 MMOL/L (ref 9–17)
AST SERPL-CCNC: 19 U/L
BACTERIA: NORMAL
BASOPHILS # BLD: 0 % (ref 0–2)
BASOPHILS ABSOLUTE: 0 K/UL (ref 0–0.2)
BILIRUB SERPL-MCNC: 1.12 MG/DL (ref 0.3–1.2)
BILIRUBIN URINE: NEGATIVE
BNP INTERPRETATION: ABNORMAL
BUN BLDV-MCNC: 23 MG/DL (ref 8–23)
BUN/CREAT BLD: ABNORMAL (ref 9–20)
C-REACTIVE PROTEIN: 5.6 MG/L (ref 0–5)
CALCIUM SERPL-MCNC: 9.1 MG/DL (ref 8.6–10.4)
CASTS UA: NORMAL /LPF (ref 0–8)
CHLORIDE BLD-SCNC: 102 MMOL/L (ref 98–107)
CO2: 28 MMOL/L (ref 20–31)
COLOR: YELLOW
COMMENT UA: ABNORMAL
CREAT SERPL-MCNC: 1.01 MG/DL (ref 0.7–1.2)
CRYSTALS, UA: NORMAL /HPF
D-DIMER QUANTITATIVE: 0.74 MG/L FEU
DIFFERENTIAL TYPE: ABNORMAL
EOSINOPHILS RELATIVE PERCENT: 1 % (ref 1–4)
EPITHELIAL CELLS UA: NORMAL /HPF (ref 0–5)
FERRITIN: 28 UG/L (ref 30–400)
FIBRINOGEN: 338 MG/DL (ref 140–420)
GFR AFRICAN AMERICAN: >60 ML/MIN
GFR NON-AFRICAN AMERICAN: >60 ML/MIN
GFR SERPL CREATININE-BSD FRML MDRD: ABNORMAL ML/MIN/{1.73_M2}
GFR SERPL CREATININE-BSD FRML MDRD: ABNORMAL ML/MIN/{1.73_M2}
GLUCOSE BLD-MCNC: 111 MG/DL (ref 70–99)
GLUCOSE URINE: NEGATIVE
HCT VFR BLD CALC: 37.4 % (ref 40.7–50.3)
HEMOGLOBIN: 11.6 G/DL (ref 13–17)
IMMATURE GRANULOCYTES: 0 %
INR BLD: 1.3
KETONES, URINE: NEGATIVE
LACTATE DEHYDROGENASE: 179 U/L (ref 135–225)
LACTIC ACID, SEPSIS WHOLE BLOOD: 1.5 MMOL/L (ref 0.5–1.9)
LACTIC ACID, SEPSIS WHOLE BLOOD: 2.2 MMOL/L (ref 0.5–1.9)
LACTIC ACID, SEPSIS: ABNORMAL MMOL/L (ref 0.5–1.9)
LACTIC ACID, SEPSIS: NORMAL MMOL/L (ref 0.5–1.9)
LEUKOCYTE ESTERASE, URINE: NEGATIVE
LIPASE: 11 U/L (ref 13–60)
LYMPHOCYTES # BLD: 4 % (ref 24–43)
MCH RBC QN AUTO: 30.8 PG (ref 25.2–33.5)
MCHC RBC AUTO-ENTMCNC: 31 G/DL (ref 28.4–34.8)
MCV RBC AUTO: 99.2 FL (ref 82.6–102.9)
MONOCYTES # BLD: 5 % (ref 3–12)
MORPHOLOGY: ABNORMAL
MUCUS: NORMAL
NITRITE, URINE: NEGATIVE
NRBC AUTOMATED: 0 PER 100 WBC
OTHER OBSERVATIONS UA: NORMAL
PARTIAL THROMBOPLASTIN TIME: 28 SEC (ref 20.5–30.5)
PDW BLD-RTO: 14.7 % (ref 11.8–14.4)
PH UA: 5 (ref 5–8)
PLATELET # BLD: 193 K/UL (ref 138–453)
PLATELET ESTIMATE: ABNORMAL
PMV BLD AUTO: 8.9 FL (ref 8.1–13.5)
POTASSIUM SERPL-SCNC: 3.6 MMOL/L (ref 3.7–5.3)
PRO-BNP: 4458 PG/ML
PROCALCITONIN: 0.12 NG/ML
PROTEIN UA: NEGATIVE
PROTHROMBIN TIME: 13.7 SEC (ref 9–12)
RBC # BLD: 3.77 M/UL (ref 4.21–5.77)
RBC # BLD: ABNORMAL 10*6/UL
RBC UA: NORMAL /HPF (ref 0–4)
RENAL EPITHELIAL, UA: NORMAL /HPF
SARS-COV-2, RAPID: NOT DETECTED
SARS-COV-2: NORMAL
SARS-COV-2: NORMAL
SEG NEUTROPHILS: 90 % (ref 36–65)
SEGMENTED NEUTROPHILS ABSOLUTE COUNT: 8.28 K/UL (ref 1.5–8.1)
SODIUM BLD-SCNC: 140 MMOL/L (ref 135–144)
SOURCE: NORMAL
SPECIFIC GRAVITY UA: 1.02 (ref 1–1.03)
TOTAL PROTEIN: 7.3 G/DL (ref 6.4–8.3)
TRICHOMONAS: NORMAL
TROPONIN INTERP: ABNORMAL
TROPONIN INTERP: ABNORMAL
TROPONIN T: ABNORMAL NG/ML
TROPONIN T: ABNORMAL NG/ML
TROPONIN, HIGH SENSITIVITY: 50 NG/L (ref 0–22)
TROPONIN, HIGH SENSITIVITY: 51 NG/L (ref 0–22)
TURBIDITY: CLEAR
URINE HGB: ABNORMAL
UROBILINOGEN, URINE: NORMAL
WBC # BLD: 9.2 K/UL (ref 3.5–11.3)
WBC # BLD: ABNORMAL 10*3/UL
WBC UA: NORMAL /HPF (ref 0–5)
YEAST: NORMAL

## 2020-10-11 PROCEDURE — APPNB15 APP NON BILLABLE TIME 0-15 MINS: Performed by: NURSE PRACTITIONER

## 2020-10-11 PROCEDURE — 6360000002 HC RX W HCPCS: Performed by: STUDENT IN AN ORGANIZED HEALTH CARE EDUCATION/TRAINING PROGRAM

## 2020-10-11 PROCEDURE — 71260 CT THORAX DX C+: CPT

## 2020-10-11 PROCEDURE — 6370000000 HC RX 637 (ALT 250 FOR IP): Performed by: NURSE PRACTITIONER

## 2020-10-11 PROCEDURE — 83880 ASSAY OF NATRIURETIC PEPTIDE: CPT

## 2020-10-11 PROCEDURE — 99222 1ST HOSP IP/OBS MODERATE 55: CPT | Performed by: STUDENT IN AN ORGANIZED HEALTH CARE EDUCATION/TRAINING PROGRAM

## 2020-10-11 PROCEDURE — 83605 ASSAY OF LACTIC ACID: CPT

## 2020-10-11 PROCEDURE — 99285 EMERGENCY DEPT VISIT HI MDM: CPT

## 2020-10-11 PROCEDURE — 83615 LACTATE (LD) (LDH) ENZYME: CPT

## 2020-10-11 PROCEDURE — 6370000000 HC RX 637 (ALT 250 FOR IP): Performed by: STUDENT IN AN ORGANIZED HEALTH CARE EDUCATION/TRAINING PROGRAM

## 2020-10-11 PROCEDURE — 85384 FIBRINOGEN ACTIVITY: CPT

## 2020-10-11 PROCEDURE — 93005 ELECTROCARDIOGRAM TRACING: CPT | Performed by: STUDENT IN AN ORGANIZED HEALTH CARE EDUCATION/TRAINING PROGRAM

## 2020-10-11 PROCEDURE — 2700000000 HC OXYGEN THERAPY PER DAY

## 2020-10-11 PROCEDURE — 2580000003 HC RX 258: Performed by: NURSE PRACTITIONER

## 2020-10-11 PROCEDURE — 81001 URINALYSIS AUTO W/SCOPE: CPT

## 2020-10-11 PROCEDURE — 87040 BLOOD CULTURE FOR BACTERIA: CPT

## 2020-10-11 PROCEDURE — 84145 PROCALCITONIN (PCT): CPT

## 2020-10-11 PROCEDURE — 2580000003 HC RX 258: Performed by: STUDENT IN AN ORGANIZED HEALTH CARE EDUCATION/TRAINING PROGRAM

## 2020-10-11 PROCEDURE — 6360000002 HC RX W HCPCS: Performed by: NURSE PRACTITIONER

## 2020-10-11 PROCEDURE — 85610 PROTHROMBIN TIME: CPT

## 2020-10-11 PROCEDURE — 80053 COMPREHEN METABOLIC PANEL: CPT

## 2020-10-11 PROCEDURE — 85025 COMPLETE CBC W/AUTO DIFF WBC: CPT

## 2020-10-11 PROCEDURE — 82728 ASSAY OF FERRITIN: CPT

## 2020-10-11 PROCEDURE — 83690 ASSAY OF LIPASE: CPT

## 2020-10-11 PROCEDURE — 1200000000 HC SEMI PRIVATE

## 2020-10-11 PROCEDURE — 86140 C-REACTIVE PROTEIN: CPT

## 2020-10-11 PROCEDURE — 85730 THROMBOPLASTIN TIME PARTIAL: CPT

## 2020-10-11 PROCEDURE — 71045 X-RAY EXAM CHEST 1 VIEW: CPT

## 2020-10-11 PROCEDURE — 87086 URINE CULTURE/COLONY COUNT: CPT

## 2020-10-11 PROCEDURE — 84484 ASSAY OF TROPONIN QUANT: CPT

## 2020-10-11 PROCEDURE — 94640 AIRWAY INHALATION TREATMENT: CPT

## 2020-10-11 PROCEDURE — APPSS45 APP SPLIT SHARED TIME 31-45 MINUTES: Performed by: NURSE PRACTITIONER

## 2020-10-11 PROCEDURE — U0002 COVID-19 LAB TEST NON-CDC: HCPCS

## 2020-10-11 PROCEDURE — 85379 FIBRIN DEGRADATION QUANT: CPT

## 2020-10-11 PROCEDURE — 6360000004 HC RX CONTRAST MEDICATION: Performed by: STUDENT IN AN ORGANIZED HEALTH CARE EDUCATION/TRAINING PROGRAM

## 2020-10-11 RX ORDER — POLYETHYLENE GLYCOL 3350 17 G/17G
17 POWDER, FOR SOLUTION ORAL DAILY
Status: DISCONTINUED | OUTPATIENT
Start: 2020-10-11 | End: 2020-10-11

## 2020-10-11 RX ORDER — POTASSIUM CHLORIDE 20 MEQ/1
20 TABLET, EXTENDED RELEASE ORAL 2 TIMES DAILY
Status: DISCONTINUED | OUTPATIENT
Start: 2020-10-11 | End: 2020-10-18 | Stop reason: HOSPADM

## 2020-10-11 RX ORDER — SODIUM CHLORIDE 0.9 % (FLUSH) 0.9 %
10 SYRINGE (ML) INJECTION EVERY 12 HOURS SCHEDULED
Status: DISCONTINUED | OUTPATIENT
Start: 2020-10-12 | End: 2020-10-18 | Stop reason: HOSPADM

## 2020-10-11 RX ORDER — SODIUM CHLORIDE 0.9 % (FLUSH) 0.9 %
10 SYRINGE (ML) INJECTION PRN
Status: DISCONTINUED | OUTPATIENT
Start: 2020-10-11 | End: 2020-10-18 | Stop reason: HOSPADM

## 2020-10-11 RX ORDER — SODIUM CHLORIDE 9 MG/ML
INJECTION, SOLUTION INTRAVENOUS CONTINUOUS
Status: DISCONTINUED | OUTPATIENT
Start: 2020-10-11 | End: 2020-10-12

## 2020-10-11 RX ORDER — SPIRONOLACTONE 25 MG/1
25 TABLET ORAL DAILY
Status: DISCONTINUED | OUTPATIENT
Start: 2020-10-11 | End: 2020-10-18 | Stop reason: HOSPADM

## 2020-10-11 RX ORDER — POLYETHYLENE GLYCOL 3350 17 G/17G
17 POWDER, FOR SOLUTION ORAL DAILY PRN
Status: DISCONTINUED | OUTPATIENT
Start: 2020-10-11 | End: 2020-10-18 | Stop reason: HOSPADM

## 2020-10-11 RX ORDER — ACETAMINOPHEN 500 MG
1000 TABLET ORAL ONCE
Status: COMPLETED | OUTPATIENT
Start: 2020-10-11 | End: 2020-10-11

## 2020-10-11 RX ORDER — IPRATROPIUM BROMIDE AND ALBUTEROL SULFATE 2.5; .5 MG/3ML; MG/3ML
1 SOLUTION RESPIRATORY (INHALATION) EVERY 4 HOURS PRN
Status: DISCONTINUED | OUTPATIENT
Start: 2020-10-11 | End: 2020-10-18 | Stop reason: HOSPADM

## 2020-10-11 RX ORDER — PANTOPRAZOLE SODIUM 40 MG/1
40 TABLET, DELAYED RELEASE ORAL DAILY
Status: DISCONTINUED | OUTPATIENT
Start: 2020-10-11 | End: 2020-10-18 | Stop reason: HOSPADM

## 2020-10-11 RX ORDER — IPRATROPIUM BROMIDE AND ALBUTEROL SULFATE 2.5; .5 MG/3ML; MG/3ML
1 SOLUTION RESPIRATORY (INHALATION)
Status: DISCONTINUED | OUTPATIENT
Start: 2020-10-11 | End: 2020-10-18 | Stop reason: HOSPADM

## 2020-10-11 RX ORDER — GABAPENTIN 300 MG/1
300 CAPSULE ORAL 3 TIMES DAILY
Status: DISCONTINUED | OUTPATIENT
Start: 2020-10-11 | End: 2020-10-18 | Stop reason: HOSPADM

## 2020-10-11 RX ORDER — BUMETANIDE 1 MG/1
1 TABLET ORAL DAILY
Status: DISCONTINUED | OUTPATIENT
Start: 2020-10-11 | End: 2020-10-18 | Stop reason: HOSPADM

## 2020-10-11 RX ORDER — CETIRIZINE HYDROCHLORIDE 10 MG/1
10 TABLET ORAL DAILY
Status: DISCONTINUED | OUTPATIENT
Start: 2020-10-11 | End: 2020-10-18 | Stop reason: HOSPADM

## 2020-10-11 RX ORDER — METOPROLOL SUCCINATE 25 MG/1
25 TABLET, EXTENDED RELEASE ORAL 2 TIMES DAILY
Status: DISCONTINUED | OUTPATIENT
Start: 2020-10-11 | End: 2020-10-18 | Stop reason: HOSPADM

## 2020-10-11 RX ORDER — MULTIVITAMIN WITH IRON
1 TABLET ORAL DAILY
Status: DISCONTINUED | OUTPATIENT
Start: 2020-10-11 | End: 2020-10-18 | Stop reason: HOSPADM

## 2020-10-11 RX ORDER — FLUTICASONE PROPIONATE 50 MCG
2 SPRAY, SUSPENSION (ML) NASAL DAILY
Status: DISCONTINUED | OUTPATIENT
Start: 2020-10-11 | End: 2020-10-18 | Stop reason: HOSPADM

## 2020-10-11 RX ORDER — PROMETHAZINE HYDROCHLORIDE 12.5 MG/1
12.5 TABLET ORAL EVERY 6 HOURS PRN
Status: DISCONTINUED | OUTPATIENT
Start: 2020-10-11 | End: 2020-10-18 | Stop reason: HOSPADM

## 2020-10-11 RX ORDER — ONDANSETRON 2 MG/ML
4 INJECTION INTRAMUSCULAR; INTRAVENOUS EVERY 6 HOURS PRN
Status: DISCONTINUED | OUTPATIENT
Start: 2020-10-11 | End: 2020-10-18 | Stop reason: HOSPADM

## 2020-10-11 RX ORDER — ROPINIROLE 0.25 MG/1
0.25 TABLET, FILM COATED ORAL 2 TIMES DAILY
Status: DISCONTINUED | OUTPATIENT
Start: 2020-10-11 | End: 2020-10-18 | Stop reason: HOSPADM

## 2020-10-11 RX ADMIN — IPRATROPIUM BROMIDE AND ALBUTEROL SULFATE 1 AMPULE: .5; 3 SOLUTION RESPIRATORY (INHALATION) at 21:14

## 2020-10-11 RX ADMIN — RIVAROXABAN 20 MG: 20 TABLET, FILM COATED ORAL at 22:12

## 2020-10-11 RX ADMIN — CEFTRIAXONE SODIUM 1 G: 1 INJECTION, POWDER, FOR SOLUTION INTRAMUSCULAR; INTRAVENOUS at 11:53

## 2020-10-11 RX ADMIN — SODIUM CHLORIDE 1.5 G: 900 INJECTION INTRAVENOUS at 21:45

## 2020-10-11 RX ADMIN — GABAPENTIN 300 MG: 300 CAPSULE ORAL at 21:44

## 2020-10-11 RX ADMIN — IPRATROPIUM BROMIDE AND ALBUTEROL SULFATE 1 AMPULE: .5; 3 SOLUTION RESPIRATORY (INHALATION) at 15:47

## 2020-10-11 RX ADMIN — ALBUTEROL 2 MG: 2 TABLET ORAL at 21:45

## 2020-10-11 RX ADMIN — IOPAMIDOL 75 ML: 755 INJECTION, SOLUTION INTRAVENOUS at 13:01

## 2020-10-11 RX ADMIN — SODIUM CHLORIDE: 9 INJECTION, SOLUTION INTRAVENOUS at 17:59

## 2020-10-11 RX ADMIN — Medication 500 MG: at 17:59

## 2020-10-11 RX ADMIN — ACETAMINOPHEN 1000 MG: 500 TABLET ORAL at 10:18

## 2020-10-11 RX ADMIN — ROPINIROLE HYDROCHLORIDE 0.25 MG: 0.25 TABLET, FILM COATED ORAL at 21:45

## 2020-10-11 ASSESSMENT — ENCOUNTER SYMPTOMS
CONSTIPATION: 0
SHORTNESS OF BREATH: 1
EYE ITCHING: 0
SINUS PRESSURE: 0
SINUS PAIN: 0
ALLERGIC/IMMUNOLOGIC NEGATIVE: 1
ABDOMINAL PAIN: 1
DIARRHEA: 1
RHINORRHEA: 1
VOMITING: 1
EYE REDNESS: 0
COUGH: 1
SORE THROAT: 0
BLOOD IN STOOL: 0
EYES NEGATIVE: 1
NAUSEA: 1
VOICE CHANGE: 1

## 2020-10-11 ASSESSMENT — PAIN SCALES - GENERAL: PAINLEVEL_OUTOF10: 0

## 2020-10-11 NOTE — H&P
[facility]  Office: [address], DO, Klausmaheshklaudia Noel, DO, Miguel Angel Sevilla, DO, Mayelin Burch, DO, Jil Ojeda MD, Mike Gonzalez MD, Samnia Adames MD, Harvinder Crowley MD, Poonam Galindo MD, Danielle Lay MD, Yelena Jarrett MD, Herminia Banks MD, Marisela Herzog MD, Lester Arteaga DO, Tom Lange MD, Kesha Wilde MD, Glenn Gomes, DO, Kodi Oakley MD,  Awilda Garcia DO, Aarti Yusuf MD, Nikolay Lopez MD, Bipin Mahoney, [facility], [facility] Chen, CNP, Marcie Cross, CNP, Marquise Collazo, CNS, Kay St, CNP, Aracely Escamilla, CNP, Nakia Price, CNP, Chris Cota, CNP, Sonu Sams, CNP, Luis Alfred[address] Way PA-C, Isaías Joe, [facility], Jordan Rich, CNP, Val Melton, CNP, Lobo Ball, CNP, Jada Armendariz, CNP, Philip Zendejas, CNP         [address]    HISTORY AND PHYSICAL EXAMINATION            Date:   10/11/2020  Patient name:  [facility]  Date of admission:  10/11/2020  9:03 AM  MRN:   [id number]  Account:  [de-identified]  YOB: 1945  PCP:    Brian Hauser PA-C  Room:   [facility]  Code Status:    Prior    Chief Complaint:     Chief Complaint   Patient presents with    Shortness of Breath    Fever       History Obtained From:     patient, electronic medical record    History of Present Illness:     [facility] is a 69 y.o. Non-/non  male who presents with Shortness of Breath and Fever   and is admitted to the hospital for the management of Pneumonia. Mr. Cara Rivera is a pleasant, cooperative 71-year-old male who presented to the emergency department today with shortness of breath and fever. Patient states he has had a chronic cough related to his COPD. Increasing cough over the last several days. Today he had an uncontrollable coughing episode which caused emesis into his CPAP. He aspirated vomitus before he could get his mask off.   Since that time he had wheezing and continued cough. He states he has had a fever over the last 24 hours not greater than 101.0 F. He also endorses increased fatigue. He is here for evaluation. He has a medical history that includes COPD, asthma, GERD, gastric bypass diastolic CHF, atrial fibrillation, hypertension and MARIAMA. He is independent in his care. He uses a walker for bilateral f progressive foot drop and bilateral AFOs    ED work-up showed a BNP of 4458. A troponin of 50. Lactic 1.5. His COVID-19 nasopharyngeal swab was negative.   CT PE was negative    Past Medical History:     Past Medical History:   Diagnosis Date    Acute superficial gastritis without hemorrhage 5/26/2018    Anastomotic stricture of stomach     Asthma     Atrial fibrillation ([address]     On Xarelto 6/27/2020    Calculus of gallbladder without cholecystitis without obstruction 5/2/2017    Chronic diastolic heart failure ([address] 11/17/2017    Chronic rhinosinusitis 4/13/2015    Class 2 severe obesity due to excess calories with serious comorbidity and body mass index (BMI) of 36.0 to 36.9 in adult ([address] 11/17/2017    E. coli septicemia ([address]     E. 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 coli ([address]     Due to UTI     Simple chronic bronchitis ([address] 4/10/2018    Slow transit constipation 11/3/2016    Unspecified sleep apnea     UTI (urinary Janett Lake PA-C   rOPINIRole (REQUIP) 0.25 MG tablet TAKE 1 TABLET BY MOUTH TWICE DAILY 8/20/20   Janett Lake PA-C   gabapentin (NEURONTIN) 300 MG capsule TAKE 1 CAPSULE BY MOUTH THREE TIMES DAILY 9/18/20 11/17/20  Janett Lake PA-C   vitamin B-12 (CYANOCOBALAMIN) 100 MCG tablet TAKE 1 TABLET BY MOUTH DAILY AS NEEDED FOR FATIGUE 7/23/20   Janett Lake PA-C   tiZANidine (ZANAFLEX) 4 MG tablet TAKE 1 TABLET BY MOUTH DAILY AS NEEDED 7/23/20   Janett Lake PA-C   spironolactone (ALDACTONE) 25 MG tablet Take 1 tablet by mouth daily 7/16/20   Janett Lake PA-C   metoprolol succinate (TOPROL XL) 25 MG extended release tablet Take 1 tablet by mouth 2 times daily 7/16/20   Janett Lake PA-C   rivaroxaban (XARELTO) 20 MG TABS tablet Take 1 tablet by mouth daily 7/16/20   Janett Lake PA-C   pantoprazole (PROTONIX) 40 MG tablet Take 1 tablet by mouth daily 7/16/20   Janett Lake PA-C   potassium chloride (KLOR-CON M) 20 MEQ extended release tablet Take 1 tablet by mouth 2 times daily 7/16/20   Janett Lake PA-C   Multiple Vitamin (MULTI-VITAMINS) TABS Take 1 tablet by mouth daily 7/16/20   Janett Lake PA-C   cetirizine (ZYRTEC) 10 MG tablet TAKE 1 TABLET BY MOUTH DAILY 5/27/20   Janett Lake PA-C   fluticasone Elvi Fillers) 50 MCG/ACT nasal spray 2 sprays by Nasal route daily 3/5/20   Janett Lake PA-C   guaiFENesin (SM MUCUS RELIEF) 600 MG extended release tablet Take 1 tablet by mouth 2 times daily 3/5/20   Janett Lake PA-C   D-Mannose 500 MG CAPS Take 500 mg by mouth 3 times daily 11/11/19 12/11/19  MD SHEREE Hankins LUBRICANT EYE DROPS 0.4-0.3 % ophthalmic solution PLACE 1 DROP INTO BOTH EYES FOUR TIMES DAILY AS NEEDED FOR DRY EYES 10/17/19   Janett Lake PA-C   Fluticasone furoate-vilanterol (BREO ELLIPTA) 200-25 MCG/INH AEPB inhaler Inhale 1 puff into the lungs daily    Historical Provider, MD   Handicap Placard [facility] by Does not apply route 6/27/19   Arun Mcclendon PA-C   Incontinence Supply Disposable (INCONTINENCE BRIEF LARGE) MISC To use as directed. Use 3x a day 4/30/19   Arun Bolanoscairmarvin PA-C   polyethylene glycol [facility]) powder Take 17 g by mouth daily 3/5/19   Arun Mcclendon PA-C   Foot Care Products (TRI-BALANCE ORTHOTICS MENS) [address] [facility] Provide insurance covered orthotics shoes, wear daily 12/12/18   Arun Mcclendon PA-C   albuterol (PROVENTIL) 2 MG tablet Use as needed 10/4/18   Claire Olson MD   ipratropium-albuterol (DUONEB) 0.5-2.5 (3) MG/3ML SOLN nebulizer solution Inhale 1 vial into the lungs every 4 hours as needed     Historical Provider, MD   Armodafinil 200 MG TABS Take 1 tablet by mouth 2 times daily. 4/2/18   Historical Provider, MD        Allergies:     Darvocet [propoxyphene n-acetaminophen]; Other; and Oxycodone-acetaminophen    Social History:     Tobacco:    reports that he quit smoking about 43 years ago. He has a 20.00 pack-year smoking history. He has never used smokeless tobacco.  Alcohol:      reports no history of alcohol use. Drug Use:  reports no history of drug use. Family History:     Family History   Problem Relation Age of Onset    Cancer Mother     Heart Attack Father        Review of Systems:     Positive and Negative as described in HPI. Review of Systems   Constitutional: Positive for fatigue and fever. HENT: Positive for voice change. Negative for sinus pressure, sinus pain and sore throat. Eyes: Negative. Respiratory: Positive for cough and shortness of breath. Cardiovascular: Positive for leg swelling. Gastrointestinal: Positive for diarrhea and nausea. Endocrine: Negative. Genitourinary: Negative. Musculoskeletal: Positive for neck pain. Skin: Negative. Allergic/Immunologic: Negative. Neurological: Positive for headaches. Hematological: Bruises/bleeds easily. Psychiatric/Behavioral: Negative.         Physical Exam:   BP (!) 103/59   Pulse Collection Time: 10/11/20  9:38 AM   Result Value Ref Range    WBC 9.2 3.5 - 11.3 k/uL    RBC 3.77 (L) 4.21 - 5.77 m/uL    Hemoglobin 11.6 (L) 13.0 - 17.0 g/dL    Hematocrit 37.4 (L) 40.7 - 50.3 %    MCV 99.2 82.6 - 102.9 fL    MCH 30.8 25.2 - 33.5 pg    MCHC 31.0 28.4 - 34.8 g/dL    RDW 14.7 (H) 11.8 - 14.4 %    Platelets 810 677 - 368 k/uL    MPV 8.9 8.1 - 13.5 fL    NRBC Automated 0.0 0.0 per 100 WBC    Differential Type NOT REPORTED     WBC Morphology NOT REPORTED     RBC Morphology NOT REPORTED     Platelet Estimate NOT REPORTED     Immature Granulocytes 0 0 %    Seg Neutrophils 90 (H) 36 - 65 %    Lymphocytes 4 (L) 24 - 43 %    Monocytes 5 3 - 12 %    Eosinophils % 1 1 - 4 %    Basophils 0 0 - 2 %    Absolute Immature Granulocyte 0.00 0.00 - 0.30 k/uL    Segs Absolute 8.28 (H) 1.50 - 8.10 k/uL    Absolute Lymph # 0.37 (L) 1.10 - 3.70 k/uL    Absolute Mono # 0.46 0.10 - 1.20 k/uL    Absolute Eos # 0.09 0.00 - 0.44 k/uL    Basophils Absolute 0.00 0.00 - 0.20 k/uL    Morphology ANISOCYTOSIS PRESENT    Comprehensive Metabolic Panel w/ Reflex to MG    Collection Time: 10/11/20  9:38 AM   Result Value Ref Range    Glucose 111 (H) 70 - 99 mg/dL    BUN 23 8 - 23 mg/dL    CREATININE 1.01 0.70 - 1.20 mg/dL    Bun/Cre Ratio NOT REPORTED 9 - 20    Calcium 9.1 8.6 - 10.4 mg/dL    Sodium 140 135 - 144 mmol/L    Potassium 3.6 (L) 3.7 - 5.3 mmol/L    Chloride 102 98 - 107 mmol/L    CO2 28 20 - 31 mmol/L    Anion Gap 10 9 - 17 mmol/L    Alkaline Phosphatase 126 40 - 129 U/L    ALT 9 5 - 41 U/L    AST 19 <40 U/L    Total Bilirubin 1.12 0.3 - 1.2 mg/dL    Total Protein 7.3 6.4 - 8.3 g/dL    Alb 4.0 3.5 - 5.2 g/dL    Albumin/Globulin Ratio 1.2 1.0 - 2.5    GFR Non-African American >60 >60 mL/min    GFR African American >60 >60 mL/min    GFR Comment          GFR Staging NOT REPORTED    Lactate, Sepsis    Collection Time: 10/11/20  9:38 AM   Result Value Ref Range    Lactic Acid, Sepsis NOT REPORTED 0.5 - 1.9 mmol/L    Lactic Troponin T NOT REPORTED <0.03 ng/mL    Troponin Interp NOT REPORTED    Urinalysis    Collection Time: 10/11/20 12:13 PM   Result Value Ref Range    Color, UA YELLOW YELLOW    Turbidity UA CLEAR CLEAR    Glucose, Ur NEGATIVE NEGATIVE    Bilirubin Urine NEGATIVE NEGATIVE    Ketones, Urine NEGATIVE NEGATIVE    Specific Gravity, UA 1.016 1.005 - 1.030    Urine Hgb SMALL (A) NEGATIVE    pH, UA 5.0 5.0 - 8.0    Protein, UA NEGATIVE NEGATIVE    Urobilinogen, Urine Normal Normal    Nitrite, Urine NEGATIVE NEGATIVE    Leukocyte Esterase, Urine NEGATIVE NEGATIVE    Urinalysis Comments NOT REPORTED    Microscopic Urinalysis    Collection Time: 10/11/20 12:13 PM   Result Value Ref Range    -          WBC, UA None 0 - 5 /HPF    RBC, UA 2 TO 5 0 - 4 /HPF    Casts UA NOT REPORTED 0 - 8 /LPF    Crystals, UA NOT REPORTED None /HPF    Epithelial Cells UA None 0 - 5 /HPF    Renal Epithelial, UA NOT REPORTED 0 /HPF    Bacteria, UA NOT REPORTED None    Mucus, UA NOT REPORTED None    Trichomonas, UA NOT REPORTED None    Amorphous, UA NOT REPORTED None    Other Observations UA NOT REPORTED NOT REQ. Yeast, UA NOT REPORTED None       Imaging/Diagnostics:  Xr Chest Portable    Result Date: 10/11/2020  Multifocal infiltrate in the right lung and also likely in the left lung base. This may represent an inflammatory process or infection. Ct Chest Pulmonary Embolism W Contrast    Result Date: 10/11/2020  1. Findings most likely represent multifocal pneumonia, right greater than left as detailed above. Follow-up is recommended to document resolution. 2. Upper abdominal free fluid, the significance of which is unclear. 3. No evidence for pulmonary embolus. 4. Four-chamber cardiac enlargement/cardiomegaly. Coronary artery disease. 5. Subcarinal lymph node enlargement measuring up to 2.1 x 2.4 cm. Mildly enlarged right hilar lymph node. 6. Cholelithiasis. 7. Postsurgical changes in association with the stomach.  8. Hypodense thyroid nodule measuring 2.5 x 1.3 cm. RECOMMENDATIONS: 2.5 cm incidental thyroid nodule. Recommend thyroid US. Reference: J Am Minda Radiol. 2015 Feb;12(2): 143-50       Assessment :      Hospital Problems           Last Modified POA    * (Principal) Pneumonia 10/11/2020 Yes    Atrial fibrillation ([facility] [address] 10/11/2020 Yes    GERD (gastroesophageal reflux disease) 10/11/2020 Yes    Hypertension 10/11/2020 Yes    COPD (chronic obstructive pulmonary disease) ([facility] 10/11/2020 Yes    Chronic diastolic heart failure ([facility] 10/11/2020 Yes          Plan:     Patient status inpatient in the Med/Surge    1. Treat aspiration pneumonia. Antibiotics as ordered. Monitor fever. Consider respiratory evaluation for percussion. 2. Monitor and treat A. fib. We will continue home anticoagulation. Xarelto 20 mg daily. 3. GERD/GI prophylaxis with home dose Protonix 40 mg p.o. daily. 4. Reorder home medications. We will continue Aldactone 25 mg p.o. daily for elevated BNP. Consider IV Lasix for any increased shortness of breath/fluid overload. 5. Morning CBC/BMP/BNP. Trend values. Replete electrolytes as needed. 6. PT/OT eval and treat. Patient with history of progressive foot drop. Wears bilateral AFOs    Consultations:   IP CONSULT TO HOSPITALIST    Patient is admitted as inpatient status because of co-morbidities listed above, severity of signs and symptoms as outlined, requirement for current medical therapies and most importantly because of direct risk to patient if care not provided in a hospital setting. Expected length of stay > 48 hours.     JULIAN Calvert NP  10/11/2020  3:05 PM    Copy sent to Dr. Kandi Ramirez PA-C

## 2020-10-11 NOTE — ED PROVIDER NOTES
separately reportable procedures. EKG:   EKG Interpretation    Interpreted by me    Rhythm: normal sinus   Rate: normal  Axis: Left  Ectopy: none  Conduction: Right bundle branch block pattern  ST Segments: Discordant depression V2  T Waves: Inversion anteroseptal and laterally  Q Waves: Anterior/inferior    Clinical Impression: no acute changes and normal EKG    Attending Physician Additional  Notes    Patient is been over the past several days getting worse. He has COPD and chronic cough. He has had 2 days of small amount of sputum which is blood-tinged. He believes he may have aspirated on one episode of coughing. He has some shortness of breath, fatigue, myalgias, fevers, vomiting and diarrhea. No sore throat or rhinorrhea. No loss of smell/taste. No exposures to COVID. He uses nebulizers. No need for home oxygen. He does have a diuretic. He has had previous UTIs. He has no UTI symptoms other than his up to 6 times nightly for nocturia. There is some leg edema. No prior DVT/PE. On exam he is febrile, normotensive, tachypneic. Lungs are diminished with mild wheezing. There is no JVD. There is mild pallor. Abdomen is benign. There is trace bilateral leg edema. Differential includes pneumonia, viral syndrome, bronchitis, COVID, consider CHF or PE, COPD exacerbation. Plan is sepsis labs, chest x-ray, antipyretics, aerosols, consider steroids and magnesium, CTA if d-dimer greater than 1, anticipate admission for treatment of COPD exacerbation and infectious process. Juan Pablo Marshall.  Umu Thompson MD, [address] [facility]  Attending Emergency  Physician               Beena Tubbs MD  10/11/20 1154

## 2020-10-11 NOTE — ED NOTES
Pt presented to ed via wife c/o hypotension at home, vomiting, fever/chills, and poss aspiration on his cpap. Pt is A&Ox4 with even non labored breaths. Pt states he took his bp at home and has read in the 90s recently. Pt reports vomiting in his sleep while wearing his cpap and believes he may have aspirated. Pt denies any cp. Pt placed on full monitor, ekg done, and iv established.       Anyi Bennett RN  10/11/20 [id number]

## 2020-10-11 NOTE — ED PROVIDER NOTES
[facility]  ED  Emergency Department Encounter  Emergency Medicine Resident     Pt Name: Ronald Escamilla  MRN: [id number]  Maryjane 1945  Date ofevaluation: 10/11/20  PCP:  Nelson Montiel PA-C    CHIEF COMPLAINT       Chief Complaint   Patient presents with    Shortness of Breath    Fever     HISTORY OF PRESENT ILLNESS  (Location/Symptom, Timing/Onset, Context/Setting, Quality, Duration, Modifying Factors, Severity, Associated signs/symptoms)     Ronald Escamilla is a 69 y.o. male who presents with shortness of breath and fever. Patient reports that for last 4 days his blood pressure has been low, he has been feeling increasingly short of breath. Reports that yesterday he threw up in his CPAP mask while sleeping and thinks he possibly aspirated. Also reports some mild pain in his chest as well as abdomen associated with this. Was admitted back in June of this year for pneumonia and states that this feels somewhat similar. Also reports a headache and hemoptysis. No urinary complaints, rashes or wounds. Reports that he has been compliant with his medications and denies any recent medication changes. Denies any current tobacco alcohol or drug use.     PAST MEDICAL / SURGICAL / SOCIAL / FAMILY HISTORY      has a past medical history of Acute superficial gastritis without hemorrhage, Anastomotic stricture of stomach, Asthma, Atrial fibrillation (HCC), Calculus of gallbladder without cholecystitis without obstruction, Chronic diastolic heart failure (HCC), Chronic rhinosinusitis, Class 2 severe obesity due to excess calories with serious comorbidity and body mass index (BMI) of 36.0 to 36.9 in adult ([address] E. coli septicemia ([address] E. coli UTI, Foot drop, right, Fracture of femur ([address] Gait disorder, Gastric out let obstruction, GERD (gastroesophageal reflux disease), Hallux valgus, acquired, bilateral, Hyperlipidemia, Hypertension, Impaired hearing, Internal hemorrhoids, Lymphedema of both lower extremities, Nausea & vomiting, OA (osteoarthritis) of knee, bilateral, Obesity, MARIAMA on CPAP, Osteoarthritis, Osteoarthritis of lumbar spine, S/P revision of total knee, Septicemia due to E. coli (HCC), Simple chronic bronchitis (HCC), Slow transit constipation, Unspecified sleep apnea, and UTI (urinary tract infection). has a past surgical history that includes Finger amputation (); Gastric bypass surgery (); Carpal tunnel release; Colonoscopy; Rotator cuff repair; joint replacement ( & ); Hemorrhoid surgery; pr egd transoral biopsy single/multiple (N/A, 2018); Upper gastrointestinal endoscopy (2018); Upper gastrointestinal endoscopy (N/A, 2018); Upper gastrointestinal endoscopy (N/A, 2018); Upper gastrointestinal endoscopy (2018); pr egd flexible foreign body removal (N/A, 2018); Upper gastrointestinal endoscopy (2018); Upper gastrointestinal endoscopy (N/A, 10/1/2018); Upper gastrointestinal endoscopy (10/1/2018); Upper gastrointestinal endoscopy (N/A, 2018); Cystoscopy (2019); and Cystoscopy (N/A, 2019).     Social History     Socioeconomic History    Marital status:      Spouse name: Not on file    Number of children: Not on file    Years of education: Not on file    Highest education level: Not on file   Occupational History    Not on file   Social Needs    Financial resource strain: Not on file    Food insecurity     Worry: Not on file     Inability: Not on file    Transportation needs     Medical: Not on file     Non-medical: Not on file   Tobacco Use    Smoking status: Former Smoker     Packs/day: 1.00     Years: 20.00     Pack years: 20.00     Last attempt to quit: 1976     Years since quittin.8    Smokeless tobacco: Never Used   Substance and Sexual Activity    Alcohol use: No    Drug use: No    Sexual activity: Not on file   Lifestyle    Physical activity     Days per week: Not on file Minutes per session: Not on file    Stress: Not on file   Relationships    Social connections     Talks on phone: Not on file     Gets together: Not on file     Attends Buddhist service: Not on file     Active member of club or organization: Not on file     Attends meetings of clubs or organizations: Not on file     Relationship status: Not on file    Intimate partner violence     Fear of current or ex partner: Not on file     Emotionally abused: Not on file     Physically abused: Not on file     Forced sexual activity: Not on file   Other Topics Concern    Not on file   Social History Narrative    Not on file       Family History   Problem Relation Age of Onset    Cancer Mother     Heart Attack Father        Allergies:  Darvocet [propoxyphene n-acetaminophen]; Other; and Oxycodone-acetaminophen    Home Medications:  Prior to Admission medications    Medication Sig Start Date End Date Taking? Authorizing Provider   bumetanide (BUMEX) 1 MG tablet TAKE 2 TABLETS BY MOUTH ON MONDAY, WEDNESDAY AND FRIDAY.  TAKE 1 TABLET BY MOUTH ON ALL OTHER DAYS 9/17/20   Krish Haque PA-C   rOPINIRole (REQUIP) 0.25 MG tablet TAKE 1 TABLET BY MOUTH TWICE DAILY 8/20/20   [facility]   gabapentin (NEURONTIN) 300 MG capsule TAKE 1 CAPSULE BY MOUTH THREE TIMES DAILY 9/18/20 11/17/20  Krishluly Haque PA-C   vitamin B-12 (CYANOCOBALAMIN) 100 MCG tablet TAKE 1 TABLET BY MOUTH DAILY AS NEEDED FOR FATIGUE 7/23/20   [facility]   tiZANidine (ZANAFLEX) 4 MG tablet TAKE 1 TABLET BY MOUTH DAILY AS NEEDED 7/23/20   [facility]   spironolactone (ALDACTONE) 25 MG tablet Take 1 tablet by mouth daily 7/16/20   [facility]   metoprolol succinate (TOPROL XL) 25 MG extended release tablet Take 1 tablet by mouth 2 times daily 7/16/20   Krishluly Haque PA-C   rivaroxaban (XARELTO) 20 MG TABS tablet Take 1 tablet by mouth daily 7/16/20   [facility]   pantoprazole (PROTONIX) 40 MG Positive for chills and fever. HENT: Positive for rhinorrhea. Negative for sore throat. Eyes: Negative for redness and itching. Respiratory: Positive for cough and shortness of breath. Cardiovascular: Positive for chest pain. Gastrointestinal: Positive for abdominal pain, nausea and vomiting. Negative for blood in stool and constipation. Genitourinary: Negative for dysuria, frequency and hematuria. Skin: Negative for rash and wound. Neurological: Positive for headaches. Negative for weakness and numbness. PHYSICAL EXAM   (up to 7 for level 4, 8 or more for level 5)      INITIAL VITALS:   BP (!) 103/59   Pulse 86   Temp 98 °F (36.7 °C) (Oral)   Resp 18   SpO2 94%     Physical Exam  Vitals signs and nursing note reviewed. Constitutional:       General: He is not in acute distress. Appearance: Normal appearance. He is not ill-appearing, toxic-appearing or diaphoretic. HENT:      Head: Normocephalic and atraumatic. Eyes:      General: No scleral icterus. Conjunctiva/sclera: Conjunctivae normal.   Neck:      Musculoskeletal: Neck supple. Cardiovascular:      Rate and Rhythm: Normal rate. Pulmonary:      Effort: Pulmonary effort is normal. No respiratory distress. Breath sounds: No stridor. Rales (Bilaterally, R >> L) present. No wheezing or rhonchi. Abdominal:      General: There is no distension. Palpations: Abdomen is soft. There is no mass. Tenderness: There is no abdominal tenderness. There is no guarding or rebound. Skin:     General: Skin is warm and dry. Findings: No rash (over exposed skin). Neurological:      General: No focal deficit present. Mental Status: He is alert and oriented to person, place, and time.    Psychiatric:         Mood and Affect: Mood normal.         Behavior: Behavior normal.         DIAGNOSTICS     PLAN (LABS / IMAGING / EKG):  Orders Placed This Encounter   Procedures    Culture, Urine    Culture, Blood 1    Culture, Blood 2    XR CHEST PORTABLE    CT CHEST PULMONARY EMBOLISM W CONTRAST    CBC Auto Differential    Comprehensive Metabolic Panel w/ Reflex to MG    Urinalysis    Lactate, Sepsis    APTT    Protime-INR    Lipase    Procalcitonin    COVID-19, PCR    Brain Natriuretic Peptide    Troponin    D-DIMER, QUANTITATIVE    C-Reactive Protein    Fibrinogen    Ferritin    Lactate Dehydrogenase    Troponin    Fibrinogen    Microscopic Urinalysis    Telemetry Monitoring    Inpatient consult to Hospitalist    EKG 12 Lead    PATIENT STATUS (FROM ED OR OR/PROCEDURAL) Inpatient       MEDICATIONS ORDERED:  Orders Placed This Encounter   Medications    acetaminophen (TYLENOL) tablet 1,000 mg    cefTRIAXone (ROCEPHIN) 1 g IVPB in 50 mL D5W minibag     Order Specific Question:   Antimicrobial Indications     Answer:   Pneumonia (CAP)    azithromycin (ZITHROMAX) 500 mg in dextrose 5% 250 mL IVPB     Order Specific Question:   Antimicrobial Indications     Answer:   Pneumonia (CAP)    iopamidol (ISOVUE-370) 76 % injection 75 mL       DIAGNOSTIC RESULTS / EMERGENCYDEPARTMENT COURSE / MDM     LABS:  Results for orders placed or performed during the hospital encounter of 10/11/20   Culture, Blood 1    Specimen: Blood   Result Value Ref Range    Specimen Description . BLOOD     Special Requests RT FA 20ML     Culture NO GROWTH 3 HOURS    Culture, Blood 2    Specimen: Blood   Result Value Ref Range    Specimen Description . BLOOD     Special Requests L FOREARM 10ML     Culture NO GROWTH 4 HOURS    CBC Auto Differential   Result Value Ref Range    WBC 9.2 3.5 - 11.3 k/uL    RBC 3.77 (L) 4.21 - 5.77 m/uL    Hemoglobin 11.6 (L) 13.0 - 17.0 g/dL    Hematocrit 37.4 (L) 40.7 - 50.3 %    MCV 99.2 82.6 - 102.9 fL    MCH 30.8 25.2 - 33.5 pg    MCHC 31.0 28.4 - 34.8 g/dL    RDW 14.7 (H) 11.8 - 14.4 %    Platelets 917 231 - 718 k/uL    MPV 8.9 8.1 - 13.5 fL    NRBC Automated 0.0 0.0 per 100 WBC    Differential Type NOT REPORTED     WBC Morphology NOT REPORTED     RBC Morphology NOT REPORTED     Platelet Estimate NOT REPORTED     Immature Granulocytes 0 0 %    Seg Neutrophils 90 (H) 36 - 65 %    Lymphocytes 4 (L) 24 - 43 %    Monocytes 5 3 - 12 %    Eosinophils % 1 1 - 4 %    Basophils 0 0 - 2 %    Absolute Immature Granulocyte 0.00 0.00 - 0.30 k/uL    Segs Absolute 8.28 (H) 1.50 - 8.10 k/uL    Absolute Lymph # 0.37 (L) 1.10 - 3.70 k/uL    Absolute Mono # 0.46 0.10 - 1.20 k/uL    Absolute Eos # 0.09 0.00 - 0.44 k/uL    Basophils Absolute 0.00 0.00 - 0.20 k/uL    Morphology ANISOCYTOSIS PRESENT    Comprehensive Metabolic Panel w/ Reflex to MG   Result Value Ref Range    Glucose 111 (H) 70 - 99 mg/dL    BUN 23 8 - 23 mg/dL    CREATININE 1.01 0.70 - 1.20 mg/dL    Bun/Cre Ratio NOT REPORTED 9 - 20    Calcium 9.1 8.6 - 10.4 mg/dL    Sodium 140 135 - 144 mmol/L    Potassium 3.6 (L) 3.7 - 5.3 mmol/L    Chloride 102 98 - 107 mmol/L    CO2 28 20 - 31 mmol/L    Anion Gap 10 9 - 17 mmol/L    Alkaline Phosphatase 126 40 - 129 U/L    ALT 9 5 - 41 U/L    AST 19 <40 U/L    Total Bilirubin 1.12 0.3 - 1.2 mg/dL    Total Protein 7.3 6.4 - 8.3 g/dL    Alb 4.0 3.5 - 5.2 g/dL    Albumin/Globulin Ratio 1.2 1.0 - 2.5    GFR Non-African American >60 >60 mL/min    GFR African American >60 >60 mL/min    GFR Comment          GFR Staging NOT REPORTED    Urinalysis   Result Value Ref Range    Color, UA YELLOW YELLOW    Turbidity UA CLEAR CLEAR    Glucose, Ur NEGATIVE NEGATIVE    Bilirubin Urine NEGATIVE NEGATIVE    Ketones, Urine NEGATIVE NEGATIVE    Specific Gravity, UA 1.016 1.005 - 1.030    Urine Hgb SMALL (A) NEGATIVE    pH, UA 5.0 5.0 - 8.0    Protein, UA NEGATIVE NEGATIVE    Urobilinogen, Urine Normal Normal    Nitrite, Urine NEGATIVE NEGATIVE    Leukocyte Esterase, Urine NEGATIVE NEGATIVE    Urinalysis Comments NOT REPORTED    Lactate, Sepsis   Result Value Ref Range    Lactic Acid, Sepsis NOT REPORTED 0.5 - 1.9 mmol/L    Lactic Acid, Sepsis, Whole Blood 2.2 (H) 0.5 - 1.9 mmol/L   Lactate, Sepsis   Result Value Ref Range    Lactic Acid, Sepsis NOT REPORTED 0.5 - 1.9 mmol/L    Lactic Acid, Sepsis, Whole Blood 1.5 0.5 - 1.9 mmol/L   APTT   Result Value Ref Range    PTT 28.0 20.5 - 30.5 sec   Protime-INR   Result Value Ref Range    Protime 13.7 (H) 9.0 - 12.0 sec    INR 1.3    Lipase   Result Value Ref Range    Lipase 11 (L) 13 - 60 U/L   Procalcitonin   Result Value Ref Range    Procalcitonin 0.12 (H) <0.09 ng/mL   COVID-19, PCR    Specimen: Other   Result Value Ref Range    SARS-CoV-2          SARS-CoV-2, Rapid Not Detected Not Detected    Source . NASOPHARYNGEAL SWAB     SARS-CoV-2         Brain Natriuretic Peptide   Result Value Ref Range    Pro-BNP 4,458 (H) <300 pg/mL    BNP Interpretation Pro-BNP Reference Range:    Troponin   Result Value Ref Range    Troponin, High Sensitivity 50 (H) 0 - 22 ng/L    Troponin T NOT REPORTED <0.03 ng/mL    Troponin Interp NOT REPORTED    D-DIMER, QUANTITATIVE   Result Value Ref Range    D-Dimer, Quant 0.74 mg/L FEU   C-Reactive Protein   Result Value Ref Range    CRP 5.6 (H) 0.0 - 5.0 mg/L   Ferritin   Result Value Ref Range    Ferritin 28 (L) 30 - 400 ug/L   Lactate Dehydrogenase   Result Value Ref Range     135 - 225 U/L   Troponin   Result Value Ref Range    Troponin, High Sensitivity 51 (H) 0 - 22 ng/L    Troponin T NOT REPORTED <0.03 ng/mL    Troponin Interp NOT REPORTED    Fibrinogen   Result Value Ref Range    Fibrinogen 338 140 - 420 mg/dL   Microscopic Urinalysis   Result Value Ref Range    -          WBC, UA None 0 - 5 /HPF    RBC, UA 2 TO 5 0 - 4 /HPF    Casts UA NOT REPORTED 0 - 8 /LPF    Crystals, UA NOT REPORTED None /HPF    Epithelial Cells UA None 0 - 5 /HPF    Renal Epithelial, UA NOT REPORTED 0 /HPF    Bacteria, UA NOT REPORTED None    Mucus, UA NOT REPORTED None    Trichomonas, UA NOT REPORTED None    Amorphous, UA NOT REPORTED None    Other Observations UA NOT REPORTED NOT REQ.     Yeast, UA NOT REPORTED None       RADIOLOGY:  CT CHEST PULMONARY EMBOLISM W CONTRAST   Preliminary Result   1. Findings most likely represent multifocal pneumonia, right greater than   left as detailed above. Follow-up is recommended to document resolution. 2. Upper abdominal free fluid, the significance of which is unclear. 3. No evidence for pulmonary embolus. 4. Four-chamber cardiac enlargement/cardiomegaly. Coronary artery disease. 5. Subcarinal lymph node enlargement measuring up to 2.1 x 2.4 cm. Mildly   enlarged right hilar lymph node. 6. Cholelithiasis. 7. Postsurgical changes in association with the stomach. 8. Hypodense thyroid nodule measuring 2.5 x 1.3 cm. RECOMMENDATIONS:   2.5 cm incidental thyroid nodule. Recommend thyroid US. Reference: J Am Minda Radiol. 2015 Feb;12(2): 143-50         XR CHEST PORTABLE   Final Result   Multifocal infiltrate in the right lung and also likely in the left lung   base. This may represent an inflammatory process or infection. EKG  Rhythm: normal sinus   Rate: normal  Axis: left  Ectopy: none  Conduction: right bundle branch block (complete)  ST Segments: no acute change  T Waves: no acute change  Q Waves: none    Clinical Impression: When compared to EKG dated 6/27/2020, no acute changes and non-specific EKG    Normal Interval Reference:  P-wave <110 ms  -200 ms  QRS <100 ms  QT <420 ms  QTc 330-470 ms    All EKG's are interpreted by the Emergency Department Physician who either signs or Co-signsthis chart in the absence of a cardiologist.    EMERGENCY DEPARTMENT COURSE:         MDM: 67-year-old gentleman presenting with increasing shortness of breath the last several day as well as nausea and vomiting. On exam he is chronically ill-appearing but nontoxic and in no acute distress. He is hypoxic on room air and borderline febrile. Heart regular rate, lungs with rales bilaterally right much more so than left. Abdomen soft and nontender. Differential diagnosis includes coronavirus, pneumonia, sepsis, ACS, aspiration pneumonia/pneumonitis, pulmonary embolism, among others. Plan is for sepsis work-up, likely admission. Patient found to have pneumonia. Negative CT scan for PE. Covered swab was negative. Will be started on Rocephin and azithromycin admitted to [facility] for further evaluation and management. PROCEDURES:  none    CONSULTS:  IP CONSULT TO HOSPITALIST    FINAL IMPRESSION      1. Pneumonia due to organism          DISPOSITION / PLAN     DISPOSITION Admitted 10/11/2020 02:29:24 PM      PATIENT REFERRED TO:  No follow-up provider specified.     DISCHARGE MEDICATIONS:  New Prescriptions    No medications on file       Judy Mackenzie DO  Emergency Medicine Resident  [address] [facility]    (Please note that portions of this note were completed with a voice recognition program.  Efforts were made to edit thedictations but occasionally words are mis-transcribed.)       Judy Mackenzie DO  Resident  10/11/20 [id number]

## 2020-10-11 NOTE — ED PROVIDER NOTES
Faculty Sign-Out Attestation  Handoff taken on the following patient from prior Attending Physician: Leroy Aburto    I was available and discussed any additional care issues that arose and coordinated the management plans with the resident(s) caring for the patient during my duty period. Any areas of disagreement with residents documentation of care or procedures are noted on the chart. I was personally present for the key portions of any/all procedures during my duty period. I have documented in the chart those procedures where I was not present during the key portions. Dyspnea, pneumonia, admitted. CT CHEST PULMONARY EMBOLISM W CONTRAST   Preliminary Result   1. Findings most likely represent multifocal pneumonia, right greater than   left as detailed above. Follow-up is recommended to document resolution. 2. Upper abdominal free fluid, the significance of which is unclear. 3. No evidence for pulmonary embolus. 4. Four-chamber cardiac enlargement/cardiomegaly. Coronary artery disease. 5. Subcarinal lymph node enlargement measuring up to 2.1 x 2.4 cm. Mildly   enlarged right hilar lymph node. 6. Cholelithiasis. 7. Postsurgical changes in association with the stomach. 8. Hypodense thyroid nodule measuring 2.5 x 1.3 cm. RECOMMENDATIONS:   2.5 cm incidental thyroid nodule. Recommend thyroid US. Reference: J Am Minda Radiol. 2015 Feb;12(2): 143-50         XR CHEST PORTABLE   Final Result   Multifocal infiltrate in the right lung and also likely in the left lung   base. This may represent an inflammatory process or infection.                Merritt Ortiz MD  Attending Physician       Merritt Ortiz MD  10/11/20 [id number]

## 2020-10-11 NOTE — FLOWSHEET NOTE
SPIRITUAL CARE PROGRESS NOTE     Spiritual Assessment: Patient and wife were approachable. Patient seemed to be calm and coping.  Intervention:  provided ministry of presence and active listening. Patient requested reading materials, and specifically asked for \"[employer]. \" Natalia Delgado provided reading materials and patient accepted prayer.  Outcome: Patient was engaged in conversation, and seemed to be coping and encouraged by the visit. Patient expressed gratitude for the visit.        10/11/20 1330   Encounter Summary   Services provided to: Patient and family together   Referral/Consult From: [id number] [employer] No   Continue Visiting   (10/11/2020)   Complexity of Encounter Low   Length of Encounter 30 minutes   Spiritual Assessment Completed Yes   Routine   Type Initial   Assessment Approachable;Calm;Coping   Intervention Active listening;Prayer;Provided reading materials/devotional materials;Sustaining presence/ Ministry of presence   Outcome Expressed gratitude;Engaged in conversation;Coping;Encouraged     Electronically signed by Joselito Nj Resident, on 10/11/2020 at 1:44 PM.  [facility]  [phone]

## 2020-10-11 NOTE — DISCHARGE INSTR - COC
Continuity of Care Form    Patient Name: Omi Kaur   :  1945  MRN:  [id number]    Admit date:  10/11/2020  Discharge date:  10/16/2020    Code Status Order: Prior   Advance Directives:      Admitting Physician:  No admitting provider for patient encounter.   PCP: Stephy Schaefer PA-C    Discharging Nurse: [facility] Unit/Room#:   Discharging Unit Phone Number: [phone]      Emergency Contact:   Extended Emergency Contact Information  Primary Emergency Contact: Marti  Address: [address] [place] [address] Phone: [phone]  Work Phone: [phone]  Mobile Phone: [phone]  Relation: Spouse  Secondary Emergency Contact: Tato Rushing  Address: XX   [address] Phone: [phone]  Work Phone: [phone]  Mobile Phone: [phone]  Relation: Child    Past Surgical History:  Past Surgical History:   Procedure Laterality Date    CARPAL TUNNEL RELEASE      bilateral    COLONOSCOPY      CYSTOSCOPY  2019    by Dr. Mike English N/A 2019    CYSTOSCOPY performed by Sameer Patel MD at [address] [facility] right index finger    GASTRIC BYPASS SURGERY  1985    vertical banded gastroplasty    [address]   &     bilateral knees    [place] EGD [address] N/A 2018    EGD FOREIGN BODY REMOVAL performed by Mirta Person MD at [address] [place] EGD TRANSORAL BIOPSY SINGLE/MULTIPLE N/A 2018    EGD BIOPSY performed by Ila Barnhart DO at [address] [facility]      left shoulder    UPPER GASTROINTESTINAL ENDOSCOPY  2018    removal of food bolus    UPPER GASTROINTESTINAL ENDOSCOPY N/A 2018    EGD FOREIGN BODY REMOVAL performed by Ila Barnhart DO at [address] [place] [address] 2018    EGD BIOPSY performed by Ila Barnhart DO at [place] [address] UPPER GASTROINTESTINAL ENDOSCOPY  08/16/2018    egd with balloon dilitation    UPPER GASTROINTESTINAL ENDOSCOPY  8/16/2018    EGD DILATION BALLOON performed by Bassam Wagner MD at [address] N/A 10/1/2018    EGD BIOPSY performed by Monika Clay MD at [address] [place] [address]  10/1/2018    EGD DILATION BALLOON performed by Monika Clay MD at [address] [place] [address] N/A 11/19/2018    EGD DILATION BALLOON performed by Monika Clay MD at [facility]       Immunization History:   Immunization History   Administered Date(s) Administered    Influenza Vaccine, unspecified formulation 01/02/2015    Influenza Virus Vaccine 01/02/2015    Influenza, Quadv, IM, PF (6 mo and older Fluzone, Flulaval, Fluarix, and 3 yrs and older Afluria) 09/28/2019    Pneumococcal Conjugate 13-valent ([id number]) 08/17/2018    Pneumococcal Polysaccharide ([id number]) 10/31/2019    Tdap (Boostrix, Adacel) 04/22/2019       Active Problems:  Patient Active Problem List   Diagnosis Code    [facility] ([facility] I48.91    Hyperlipidemia E78.5    GERD (gastroesophageal reflux disease) K21.9    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    OA (osteoarthritis) of knee, bilateral M17.10    Hallux valgus, acquired, bilateral M20.11, M20.12    Hypertension I10    Slow transit constipation K59.01    MARIAMA on CPAP G47.33, Z99.89    Class 2 severe obesity due to excess calories with serious comorbidity and body mass index (BMI) of 36.0 to 36.9 in adult ([facility] E66.01, Z68.36    Simple chronic bronchitis (HCC) J41.0    Hospital-acquired bacterial pneumonia J15.9    Fluid overload E87.70       Isolation/Infection:   Isolation            No Isolation          Unreconciled External Infections       Infection Onset Last Indicated Last Received Source    No mapped external infections found      .     Unmapped Infections (2)        MRSA 06/14/20 06/14/20 07/16/20     CRE 06/17/20 06/17/20 07/16/20                   Patient Infection Status       Infection Onset Added Last Indicated Last Indicated By Review Planned Expiration Resolved Resolved By    COVID-19 Rule Out 10/11/20 10/11/20 10/11/20 COVID-19, PCR (Ordered) 10/18/20 10/25/20              Nurse Assessment:  Last Vital Signs: /63   Pulse 78   Temp 100.2 °F (37.9 °C) (Oral)   Resp 18   SpO2 93%     Last documented pain score (0-10 scale):    Last Weight:   Wt Readings from Last 1 Encounters:   07/16/20 205 lb (93 kg)     Mental Status:  oriented and alert    IV Access:  - None    Nursing Mobility/ADLs:  Walking   Assisted  Transfer  Assisted  Bathing  Assisted  Dressing  Assisted  Toileting  Assisted  Feeding  Independent  Med Admin  Independent  Med Delivery   whole    Wound Care Documentation and Therapy:        Elimination:  Continence: Bowel: Yes  Bladder: Yes  Urinary Catheter: None   Colostomy/Ileostomy/Ileal Conduit: No       Date of Last BM: ***  No intake or output data in the 24 hours ending 10/11/20 0935  No intake/output data recorded. Safety Concerns: At Risk for Falls and Aspiration Risk    Impairments/Disabilities:      None    Nutrition Therapy:  Current Nutrition Therapy:   - Oral Diet:  Dental Soft    Routes of Feeding: Oral  Liquids: No Restrictions  Daily Fluid Restriction: no  Last Modified Barium Swallow with Video (Video Swallowing Test): done on 10/13/2020/***    Treatments at the Time of Hospital Discharge:   Respiratory Treatments: ***  Oxygen Therapy:  is not on home oxygen therapy.   Ventilator:    - CPAP   only when sleeping    Rehab Therapies: Physical Therapy and Occupational Therapy  Weight Bearing Status/Restrictions: No weight bearing restirctions  Other Medical Equipment (for information only, NOT a DME order):  {EQUIPMENT:[id number]}  Other Treatments: ***    Patient's personal belongings (please select all that are sent with patient):  patient belonging bag    RN SIGNATURE: Electronically signed by Aramis Fonseca RN on 10/16/20 at 3:11 PM EDT    CASE MANAGEMENT/SOCIAL WORK SECTION    Inpatient Status Date: 10/11/2020    Readmission Risk Assessment Score:  Readmission Risk              Risk of Unplanned Readmission:        0           Discharging to Facility/ Agency   Name: [facility]   Address:  Phone: [phone]  Fax:    Dialysis Facility (if applicable)   Name:  Address:  Dialysis Schedule:  Phone:  Fax:    / signature: Electronically signed by Alissa No RN on 10/16/20 at 2:44 PM EDT    PHYSICIAN SECTION    Prognosis: Fair    Condition at Discharge: Stable    Rehab Potential (if transferring to Rehab): Good    Recommended Labs or Other Treatments After Discharge: Follow up with PCP and Gastroenterologist within one to two weeks of discharge. Needs repeat Xray in 4-6 weeks. Physician Certification: I certify the above information and transfer of Kamari Matos  is necessary for the continuing treatment of the diagnosis listed and that he requires [address] for less 30 days.      Update Admission H&P: No change in H&P    PHYSICIAN SIGNATURE:  Electronically signed by Jaden Walker DO on 10/15/20 at 9:38 AM EDT

## 2020-10-12 LAB
CULTURE: NO GROWTH
EKG ATRIAL RATE: 67 BPM
EKG Q-T INTERVAL: 414 MS
EKG QRS DURATION: 128 MS
EKG QTC CALCULATION (BAZETT): 503 MS
EKG R AXIS: -81 DEGREES
EKG T AXIS: 82 DEGREES
EKG VENTRICULAR RATE: 89 BPM
Lab: NORMAL
SPECIMEN DESCRIPTION: NORMAL

## 2020-10-12 PROCEDURE — 94640 AIRWAY INHALATION TREATMENT: CPT

## 2020-10-12 PROCEDURE — 97166 OT EVAL MOD COMPLEX 45 MIN: CPT

## 2020-10-12 PROCEDURE — 6360000002 HC RX W HCPCS: Performed by: NURSE PRACTITIONER

## 2020-10-12 PROCEDURE — 93010 ELECTROCARDIOGRAM REPORT: CPT | Performed by: INTERNAL MEDICINE

## 2020-10-12 PROCEDURE — 1200000000 HC SEMI PRIVATE

## 2020-10-12 PROCEDURE — 2580000003 HC RX 258: Performed by: STUDENT IN AN ORGANIZED HEALTH CARE EDUCATION/TRAINING PROGRAM

## 2020-10-12 PROCEDURE — 2580000003 HC RX 258: Performed by: NURSE PRACTITIONER

## 2020-10-12 PROCEDURE — 6370000000 HC RX 637 (ALT 250 FOR IP): Performed by: NURSE PRACTITIONER

## 2020-10-12 PROCEDURE — 99232 SBSQ HOSP IP/OBS MODERATE 35: CPT | Performed by: STUDENT IN AN ORGANIZED HEALTH CARE EDUCATION/TRAINING PROGRAM

## 2020-10-12 PROCEDURE — 97530 THERAPEUTIC ACTIVITIES: CPT

## 2020-10-12 PROCEDURE — 97162 PT EVAL MOD COMPLEX 30 MIN: CPT

## 2020-10-12 PROCEDURE — 97535 SELF CARE MNGMENT TRAINING: CPT

## 2020-10-12 PROCEDURE — 6360000002 HC RX W HCPCS: Performed by: STUDENT IN AN ORGANIZED HEALTH CARE EDUCATION/TRAINING PROGRAM

## 2020-10-12 RX ORDER — 0.9 % SODIUM CHLORIDE 0.9 %
250 INTRAVENOUS SOLUTION INTRAVENOUS ONCE
Status: COMPLETED | OUTPATIENT
Start: 2020-10-12 | End: 2020-10-12

## 2020-10-12 RX ORDER — ACETAMINOPHEN 325 MG/1
650 TABLET ORAL EVERY 4 HOURS PRN
Status: DISCONTINUED | OUTPATIENT
Start: 2020-10-12 | End: 2020-10-18 | Stop reason: HOSPADM

## 2020-10-12 RX ADMIN — ACETAMINOPHEN 650 MG: 325 TABLET ORAL at 09:50

## 2020-10-12 RX ADMIN — SODIUM CHLORIDE 1.5 G: 900 INJECTION INTRAVENOUS at 12:29

## 2020-10-12 RX ADMIN — ACETAMINOPHEN 650 MG: 325 TABLET ORAL at 01:01

## 2020-10-12 RX ADMIN — THERA TABS 1 TABLET: TAB at 12:28

## 2020-10-12 RX ADMIN — ROPINIROLE HYDROCHLORIDE 0.25 MG: 0.25 TABLET, FILM COATED ORAL at 20:48

## 2020-10-12 RX ADMIN — IPRATROPIUM BROMIDE AND ALBUTEROL SULFATE 1 AMPULE: .5; 3 SOLUTION RESPIRATORY (INHALATION) at 21:16

## 2020-10-12 RX ADMIN — IPRATROPIUM BROMIDE AND ALBUTEROL SULFATE 1 AMPULE: .5; 3 SOLUTION RESPIRATORY (INHALATION) at 12:16

## 2020-10-12 RX ADMIN — IPRATROPIUM BROMIDE AND ALBUTEROL SULFATE 1 AMPULE: .5; 3 SOLUTION RESPIRATORY (INHALATION) at 09:24

## 2020-10-12 RX ADMIN — SODIUM CHLORIDE 1.5 G: 900 INJECTION INTRAVENOUS at 05:45

## 2020-10-12 RX ADMIN — ALBUTEROL 2 MG: 2 TABLET ORAL at 09:37

## 2020-10-12 RX ADMIN — ONDANSETRON 4 MG: 2 INJECTION INTRAMUSCULAR; INTRAVENOUS at 20:22

## 2020-10-12 RX ADMIN — RIVAROXABAN 20 MG: 20 TABLET, FILM COATED ORAL at 09:37

## 2020-10-12 RX ADMIN — GABAPENTIN 300 MG: 300 CAPSULE ORAL at 20:22

## 2020-10-12 RX ADMIN — SODIUM CHLORIDE 250 ML: 9 INJECTION, SOLUTION INTRAVENOUS at 01:01

## 2020-10-12 RX ADMIN — Medication 10 ML: at 09:38

## 2020-10-12 RX ADMIN — ALBUTEROL 2 MG: 2 TABLET ORAL at 20:22

## 2020-10-12 RX ADMIN — FLUTICASONE PROPIONATE 2 SPRAY: 50 SPRAY, METERED NASAL at 18:23

## 2020-10-12 RX ADMIN — GABAPENTIN 300 MG: 300 CAPSULE ORAL at 09:37

## 2020-10-12 RX ADMIN — GABAPENTIN 300 MG: 300 CAPSULE ORAL at 14:03

## 2020-10-12 RX ADMIN — SODIUM CHLORIDE 1.5 G: 900 INJECTION INTRAVENOUS at 18:23

## 2020-10-12 RX ADMIN — IPRATROPIUM BROMIDE AND ALBUTEROL SULFATE 1 AMPULE: .5; 3 SOLUTION RESPIRATORY (INHALATION) at 16:43

## 2020-10-12 RX ADMIN — CETIRIZINE HYDROCHLORIDE 10 MG: 10 TABLET ORAL at 09:37

## 2020-10-12 RX ADMIN — PANTOPRAZOLE SODIUM 40 MG: 40 TABLET, DELAYED RELEASE ORAL at 09:37

## 2020-10-12 RX ADMIN — ROPINIROLE HYDROCHLORIDE 0.25 MG: 0.25 TABLET, FILM COATED ORAL at 12:28

## 2020-10-12 RX ADMIN — METOPROLOL SUCCINATE 25 MG: 25 TABLET, FILM COATED, EXTENDED RELEASE ORAL at 20:22

## 2020-10-12 ASSESSMENT — PAIN DESCRIPTION - PROGRESSION
CLINICAL_PROGRESSION: GRADUALLY IMPROVING

## 2020-10-12 ASSESSMENT — PAIN DESCRIPTION - DESCRIPTORS: DESCRIPTORS: SHARP;DISCOMFORT

## 2020-10-12 ASSESSMENT — PAIN SCALES - GENERAL
PAINLEVEL_OUTOF10: 2
PAINLEVEL_OUTOF10: 6
PAINLEVEL_OUTOF10: 2
PAINLEVEL_OUTOF10: 5
PAINLEVEL_OUTOF10: 0
PAINLEVEL_OUTOF10: 5

## 2020-10-12 ASSESSMENT — PAIN DESCRIPTION - PAIN TYPE: TYPE: ACUTE PAIN

## 2020-10-12 ASSESSMENT — PAIN DESCRIPTION - ONSET: ONSET: ON-GOING

## 2020-10-12 ASSESSMENT — PAIN DESCRIPTION - LOCATION
LOCATION: CHEST
LOCATION: CHEST

## 2020-10-12 ASSESSMENT — PAIN DESCRIPTION - FREQUENCY: FREQUENCY: CONTINUOUS

## 2020-10-12 ASSESSMENT — PAIN - FUNCTIONAL ASSESSMENT: PAIN_FUNCTIONAL_ASSESSMENT: PREVENTS OR INTERFERES SOME ACTIVE ACTIVITIES AND ADLS

## 2020-10-12 NOTE — CARE COORDINATION
Wife with concerns on POC and bed placement, discussed and update on bed availability. Wife verbalizes satisfaction with responses and no further questions or concerns.

## 2020-10-12 NOTE — PROGRESS NOTES
There are no tele monitors available. Provider and supervision notified. Pt is currently resting. Will continue to monitor.

## 2020-10-12 NOTE — PROGRESS NOTES
[facility]  Office: Dominik Joseph, DO, Arlene Robertson, DO, Umm Waddell, DO, Lisa Burch, DO, Carrie Macedo MD, Asiya Cameron MD, Rubina Sands MD, Tawnya Flor MD, Cathie Redmond MD, Carol Bishop MD, Carli Meyers MD, Elvi Fernández MD, Marisela Landin MD, Kojo Turner DO, Kamron Gonzalez MD, Jordon Chu MD, Steven Pedraza DO, Isha Butterfield MD,  Nils Avalos, DO, Elliot Carrington MD, Lakisha Powell MD, Chu Sun, [facility], [facility], [facility], Bri Mcnair, CNP, Alfonso Ramos, CNS, Kem Locke, CNP, Yoni Serrano, CNP, Edilia Wagner, CNP, Dionicia Frankel, [facility], Herrera Tejeda, CNP, Mimi Vaca PA-C, Nabeel Fang, [facility], Venecia Meza, CNP, Fredi Abdullahi, CNP, Reji Soliz, CNP, Jassi Kirkland, CNP, Madalyn Cordoba, [facility]   [address] [facility]    Progress Note    10/12/2020    8:32 AM    Name:   Jackson Prieto  MRN:     [id number]     Acct:      [de-identified]   Room:   [facility]85   Day:  1  Admit Date:  10/11/2020  9:03 AM    PCP:   Denzel Holter, PA-C  Code Status:  Full Code    Subjective:     C/C:   Chief Complaint   Patient presents with    Shortness of Breath    Fever     Interval History Status: improved. Patient seen and examined at bedside. Still having intermittent cough, some shortness of breath. Otherwise doing well. Blood pressure overall stable, no fevers, saturating well on room air.     Brief History:     71-year-old male history of diastolic heart failure, COPD, GERD, hypertension, MARIAMA on CPAP, atrial fibrillation presents with shortness of breath found to have aspiration PNA    Review of Systems:     Constitutional:  negative for chills, fevers, sweats  Respiratory:  negative for cough, dyspnea on exertion, shortness of breath, wheezing  Cardiovascular:  negative for chest pain, chest pressure/discomfort, lower extremity edema, palpitations  Gastrointestinal:  negative for abdominal pain, constipation, diarrhea, nausea, vomiting  Neurological:  negative for dizziness, headache    Medications: Allergies:     Allergies   Allergen Reactions    Darvocet [Propoxyphene N-Acetaminophen] Hives    Other Hives    Oxycodone-Acetaminophen        Current Meds:   Scheduled Meds:    albuterol  2 mg Oral BID    [Held by provider] bumetanide  1 mg Oral Daily    cetirizine  10 mg Oral Daily    fluticasone  2 spray Nasal Daily    gabapentin  300 mg Oral TID    multivitamin  1 tablet Oral Daily    metoprolol succinate  25 mg Oral BID    pantoprazole  40 mg Oral Daily    [Held by provider] potassium chloride  20 mEq Oral BID    rivaroxaban  20 mg Oral Daily    rOPINIRole  0.25 mg Oral BID    [Held by provider] spironolactone  25 mg Oral Daily    sodium chloride flush  10 mL Intravenous 2 times per day    ipratropium-albuterol  1 ampule Inhalation Q4H WA    ampicillin-sulbactam  1.5 g Intravenous Q6H     Continuous Infusions:    sodium chloride 75 mL/hr at 10/11/20 1759     PRN Meds: acetaminophen, ipratropium-albuterol, sodium chloride flush, magnesium hydroxide, promethazine **OR** ondansetron, polyethylene glycol    Data:     Past Medical History:   has a past medical history of Acute superficial gastritis without hemorrhage, Anastomotic stricture of stomach, Asthma, [facility] ([facility] Calculus of gallbladder without cholecystitis without obstruction, Chronic diastolic heart failure (HCC), Chronic rhinosinusitis, Class 2 severe obesity due to excess calories with serious comorbidity and body mass index (BMI) of 36.0 to 36.9 in adult ([facility] E. coli septicemia ([facility] E. coli UTI, Foot drop, right, Fracture of femur ([facility] Gait disorder, Gastric out let obstruction, GERD (gastroesophageal reflux disease), Hallux valgus, acquired, bilateral, Hyperlipidemia, Hypertension, Impaired hearing, Internal hemorrhoids, Lymphedema of both lower extremities, Nausea & vomiting, OA (osteoarthritis) of knee, bilateral, Obesity, MARIAMA on CPAP, Osteoarthritis, Osteoarthritis of lumbar spine, S/P revision of total knee, Septicemia due to E. coli (HCC), Simple chronic bronchitis (HCC), Slow transit constipation, Unspecified sleep apnea, and UTI (urinary tract infection). Social History:   reports that he quit smoking about 43 years ago. He has a 20.00 pack-year smoking history. He has never used smokeless tobacco. He reports that he does not drink alcohol or use drugs. Family History:   Family History   Problem Relation Age of Onset   [facility] Cancer Mother     Heart Attack Father        Vitals:  BP (!) 101/58   Pulse 67   Temp 98.4 °F (36.9 °C) (Oral)   Resp 18   Ht 5' 10\" (1.778 m)   Wt 217 lb (98.4 kg)   SpO2 96%   BMI 31.14 kg/m²   Temp (24hrs), Av.7 °F (37.1 °C), Min:97.2 °F (36.2 °C), Max:100.2 °F (37.9 °C)    No results for input(s): POCGLU in the last 72 hours. I/O (24Hr):   No intake or output data in the 24 hours ending 10/12/20 0832    Labs:  Hematology:  Recent Labs     10/11/20  0938   WBC 9.2   RBC 3.77*   HGB 11.6*   HCT 37.4*   MCV 99.2   MCH 30.8   MCHC 31.0   RDW 14.7*      MPV 8.9   CRP 5.6*   INR 1.3   DDIMER 0.74     Chemistry:  Recent Labs     10/11/20  0938 10/11/20  1207     --    K 3.6*  --      --    CO2 28  --    GLUCOSE 111*  --    BUN 23  --    CREATININE 1.01  --    ANIONGAP 10  --    LABGLOM >60  --    GFRAA >60  --    CALCIUM 9.1  --    PROBNP 4,458*  --    TROPHS 51* 50*     Recent Labs     10/11/20  0938   PROT 7.3   LABALBU 4.0   AST 19   ALT 9      ALKPHOS 126   BILITOT 1.12   LIPASE 11*     ABG:No results found for: POCPH, PHART, PH, POCPCO2, JPO1CRQ, PCO2, POCPO2, PO2ART, PO2, POCHCO3, HAE8ZIP, HCO3, NBEA, PBEA, BEART, BE, THGBART, THB, KSM4WHY, OXQV9SNM, R9FVNEXV, O2SAT, FIO2  Lab Results   Component Value Date/Time    SPECIAL NOT REPORTED 10/11/2020 12:13 PM     Lab Results   Component Value Date/Time    CULTURE NO GROWTH 10/11/2020 12:13 PM       Radiology:  Xr Chest Portable    Result Date: 10/11/2020  Multifocal infiltrate in the right lung and also likely in the left lung base. This may represent an inflammatory process or infection. Ct Chest Pulmonary Embolism W Contrast    Result Date: 10/11/2020  1. Findings most likely represent multifocal pneumonia, right greater than left as detailed above. Follow-up is recommended to document resolution. 2. Upper abdominal free fluid, the significance of which is unclear. 3. No evidence for pulmonary embolus. 4. Four-chamber cardiac enlargement/cardiomegaly. Coronary artery disease. 5. Subcarinal lymph node enlargement measuring up to 2.1 x 2.4 cm. Mildly enlarged right hilar lymph node. 6. Cholelithiasis. 7. Postsurgical changes in association with the stomach. 8. Hypodense thyroid nodule measuring 2.5 x 1.3 cm. RECOMMENDATIONS: 2.5 cm incidental thyroid nodule. Recommend thyroid US. Reference: J Am Minda Radiol. 2015 Feb;12(2): 143-50       Physical Examination:        General appearance:  alert, cooperative and no distress  Mental Status:  oriented to person, place and time and normal affect  Lungs:  Decreased breath sounds, no wheezing no rhonchi  Heart:  regular rate and rhythm, no murmur  Abdomen:  soft, nontender, nondistended, normal bowel sounds  Extremities:  no edema, redness, tenderness in the calves  Skin:  no gross lesions, rashes, induration    Assessment:        Hospital Problems           Last Modified POA    * (Principal) Pneumonia due to organism 10/11/2020 Yes    Atrial fibrillation ([address] 10/11/2020 Yes    GERD (gastroesophageal reflux disease) 10/11/2020 Yes    Hypertension 10/11/2020 Yes    MARIAMA on CPAP 10/11/2020 Yes    COPD (chronic obstructive pulmonary disease) ([address] 10/11/2020 Yes    Chronic diastolic heart failure ([address] 10/11/2020 Yes    H/O gastric bypass 10/11/2020 Yes          Plan:        1. Aspiration PNA. Unasyn, monitor response  2.  Afib, CHF, continue xarelto, bumex, aldactone, monitor  potassium, toprol XL, Cardiology consulted for assistance  3. Liliam Pals. Protonix  4. Monitor for hypokalemia  5. MARIAMA requriing CPAP  6. Continue inhalers,   7. Gabapentin for neuropathy  8. Ropinerole for RLS  9.  Discharge planning    Lenora Golden MD  10/12/2020  8:32 AM

## 2020-10-12 NOTE — PLAN OF CARE
Problem: Falls - Risk of:  Goal: Will remain free from falls  Description: Will remain free from falls  10/12/2020 1835 by Yunior Fall RN  Outcome: Ongoing  10/12/2020 1509 by Yunior Fall RN  Outcome: Ongoing  10/12/2020 0703 by Kallie Jiménez RN  Outcome: Ongoing  Goal: Absence of physical injury  Description: Absence of physical injury  10/12/2020 1835 by Yunior Fall RN  Outcome: Ongoing  10/12/2020 1509 by Yunior Fall RN  Outcome: Ongoing  10/12/2020 0703 by Kallie Jiménez RN  Outcome: Ongoing     Problem: Skin Integrity:  Goal: Will show no infection signs and symptoms  Description: Will show no infection signs and symptoms  10/12/2020 1835 by Yunior Fall RN  Outcome: Ongoing  10/12/2020 1509 by Yunior Fall RN  Outcome: Ongoing  10/12/2020 0703 by Kallie Jiménez RN  Outcome: Ongoing  Goal: Absence of new skin breakdown  Description: Absence of new skin breakdown  10/12/2020 1835 by Yunior Fall RN  Outcome: Ongoing  10/12/2020 1509 by Yunior Fall RN  Outcome: Ongoing  10/12/2020 0703 by Kallie Jiménez RN  Outcome: Ongoing     Problem: Musculor/Skeletal Functional Status  Goal: Highest potential functional level  10/12/2020 1835 by Yunior Fall RN  Outcome: Ongoing  10/12/2020 1509 by Yunior Fall RN  Outcome: Ongoing     Problem: Pain:  Goal: Pain level will decrease  Description: Pain level will decrease  10/12/2020 1835 by Yunior Fall RN  Outcome: Ongoing  10/12/2020 1509 by Yunior Fall RN  Outcome: Ongoing  Goal: Control of acute pain  Description: Control of acute pain  10/12/2020 1835 by Yunior Fall RN  Outcome: Ongoing  10/12/2020 1509 by Yunior Fall RN  Outcome: Ongoing  Goal: Control of chronic pain  Description: Control of chronic pain  10/12/2020 1835 by Yunior Fall RN  Outcome: Ongoing  10/12/2020 1509 by Yunior Fall RN  Outcome: Ongoing

## 2020-10-12 NOTE — PROGRESS NOTES
Occupational Therapy   Occupational Therapy Initial Assessment  Date: 10/12/2020   Patient Name: Rafa Bailey  MRN: [id number]     : 1945    Date of Service: 10/12/2020    Discharge Recommendations:       Further therapy recommended at discharge. Assessment   Performance deficits / Impairments: Decreased functional mobility ; Decreased ADL status; Decreased endurance;Decreased balance;Decreased high-level IADLs;Decreased safe awareness  Assessment: Pt demonstrated functional mobility from bed to bathroom toilet and back using RW at [place] [address] with occasional vc for safety. Pt demonstrated and verbalized fatigue following bed mobility to get to sitting EOB, and following funcitonal mobility tasks. Pt performed standing and functional mobility tasks at [place] [address] for safety due to endurance, balance, and safety concerns. These limitations decrease the participants engagement in ADLs, functional mobiliy/transfers, and IADLs warrenting skilled OT intervention. Prognosis: Good  Decision Making: Medium Complexity  Patient Education: Pt educated on purpose of OT eval, safety during functional mobility tasks, use of grab bars on the toilet, rest break d/t signs of fatigue prior to standing EOB  REQUIRES OT FOLLOW UP: Yes  Activity Tolerance  Activity Tolerance: Patient Tolerated treatment well;Patient limited by fatigue;Treatment limited secondary to decreased cognition  Safety Devices  Safety Devices in place: Yes  Type of devices: All fall risk precautions in place; Bed alarm in place;Call light within reach;Gait belt;Patient at risk for falls; Left in bed  Restraints  Initially in place: No           Patient Diagnosis(es): The encounter diagnosis was Pneumonia due to organism.      has a past medical history of Acute superficial gastritis without hemorrhage, Anastomotic stricture of stomach, Asthma, Atrial fibrillation ([facility] [address] Calculus of gallbladder without cholecystitis without obstruction, Chronic diastolic heart failure [facility]), Chronic rhinosinusitis, Class 2 severe obesity due to excess calories with serious comorbidity and body mass index (BMI) of 36.0 to 36.9 in adult [facility]), E. coli septicemia ([facility] [address] E. coli UTI, Foot drop, right, Fracture of femur ([facility] [address] Gait disorder, Gastric out let obstruction, GERD (gastroesophageal reflux disease), Hallux valgus, acquired, bilateral, Hyperlipidemia, Hypertension, Impaired hearing, Internal hemorrhoids, Lymphedema of both lower extremities, Nausea & vomiting, OA (osteoarthritis) of knee, bilateral, Obesity, MARIAMA on CPAP, Osteoarthritis, Osteoarthritis of lumbar spine, S/P revision of total knee, Septicemia due to E. coli (HCC), Simple chronic bronchitis (HCC), Slow transit constipation, Unspecified sleep apnea, and UTI (urinary tract infection). has a past surgical history that includes Finger amputation (1966); Gastric bypass surgery (1985); Carpal tunnel release; Colonoscopy; Rotator cuff repair; joint replacement (2004 & 2005); Hemorrhoid surgery; pr egd transoral biopsy single/multiple (N/A, 5/25/2018); Upper gastrointestinal endoscopy (08/13/2018); Upper gastrointestinal endoscopy (N/A, 8/13/2018); Upper gastrointestinal endoscopy (N/A, 8/13/2018); Upper gastrointestinal endoscopy (08/16/2018); pr egd flexible foreign body removal (N/A, 8/16/2018); Upper gastrointestinal endoscopy (8/16/2018); Upper gastrointestinal endoscopy (N/A, 10/1/2018); Upper gastrointestinal endoscopy (10/1/2018); Upper gastrointestinal endoscopy (N/A, 11/19/2018); Cystoscopy (01/02/2019); and Cystoscopy (N/A, 1/2/2019). Restrictions  Restrictions/Precautions  Restrictions/Precautions: General Precautions, Fall Risk, Up as Tolerated  Required Braces or Orthoses?: Yes  Required Braces or Orthoses  Right Lower Extremity Brace: Ankle Foot Orthotics  Left Lower Extremity Brace:  [address] Orthotics    Subjective   General  Patient assessed for rehabilitation services?: Yes  Family / Caregiver Present: No  General Comment  Comments: NS ok for OT eval.  Pt pleasant and cooperative throughout session. Patient Currently in Pain: Other (comment)(Pt facial expression and body language demonstrated no indications of pain during session)  Pain Assessment  Pain Assessment: Faces  Pain Level: 0  Patient Currently in Pain: Other (comment)(Pt facial expression and body language demonstrated no indications of pain during session)  Social/Functional History  Social/Functional History  Lives With: Spouse  Type of Home: House  Home Layout: Two level, Able to Live on Main level with bedroom/bathroom  Home Access: Stairs to enter with rails  Entrance Stairs - Number of Steps: 5  Entrance Stairs - Rails: Right  Bathroom Shower/Tub: Tub/Shower unit  Bathroom Toilet: Handicap height  Bathroom Equipment: Tub transfer bench  Home Equipment: 4 wheeled walker, [employer] Help From: Family(daughter in law lives close by and is able to help)  ADL Assistance: Independent  Homemaking Assistance: Independent  Homemaking Responsibilities: Yes  Ambulation Assistance: Independent  Transfer Assistance: Independent  Active : No  Patient's  Info: Son or daughter drives  Occupation: Retired  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 ~5 min on toilet.)  Standing Balance: Contact guard assistance  Standing Balance  Time: ~3 min  Activity: EOB and in bathroom toiletside  Functional Mobility  Functional - Mobility Device: Rolling Walker  Activity: To/from bathroom  Assist Level: Contact guard assistance  Functional Mobility Comments: Pt required occasional VC to pause for safety as therapist handled IV line management.   Toilet Transfers  Toilet - Technique: Ambulating(performed RW)  Equipment Used: Standard toilet(with rails)  Toilet Transfer: Contact guard assistance  ADL  Feeding: Increased time to complete;Setup;Modified independent   Grooming: Modified independent ; Increased time to complete;Setup  UE Bathing: Supervision; Increased time to complete;Setup  LE Bathing: Increased time to complete;Setup;Contact guard assistance  UE Dressing: Modified independent ; Increased time to complete;Setup  LE Dressing: Increased time to complete;Setup;Contact guard assistance(Pt donned B AFO and shoes at EOB at SBA)  Toileting: Increased time to complete;Setup;Contact guard assistance(Pt completed toileting using grab bars and setup.   Pt completed clothing management at CGA and completed kathryn care in seated position at SBA)  Tone RUE  RUE Tone: Normotonic  Tone LUE  LUE Tone: Normotonic  Coordination  Movements Are Fluid And Coordinated: Yes     Bed mobility  Supine to Sit: Stand by assistance  Sit to Supine: Stand by assistance  Transfers  Sit to stand: Contact guard assistance  Stand to sit: Contact guard assistance     Cognition  Overall Cognitive Status: Exceptions  Safety Judgement: Decreased awareness of need for safety        Sensation  Overall Sensation Status: Impaired(Reports numbness in B feet)        LUE AROM (degrees)  LUE AROM : WFL (NFA)  Left Hand AROM (degrees)  Left Hand AROM: WFL   RUE AROM (degrees)  RUE AROM : WFL (NFA)  Right Hand AROM (degrees)  Right Hand AROM: WFL  LUE Strength  Gross LUE Strength: WFL  RUE Strength  Gross RUE Strength: WFL                   Plan   Plan  Times per week: 3x/wk  Current Treatment Recommendations: Strengthening, Balance Training, Functional Mobility Training, Endurance Training, Safety Education & Training, Patient/Caregiver Education & Training, Equipment Evaluation, Education, & procurement, Self-Care / ADL             AM-PAC Score        AM-[facility] Inpatient Daily Activity Raw Score: 21 (10/12/20 5367)  AM-PAC Inpatient ADL T-Scale Score : 44.27 (10/12/20 1642)  ADL Inpatient CMS 0-100% Score: 32.79 (10/12/20 1642)  ADL Inpatient CMS G-Code Modifier : CJ (10/12/20 1642)    Goals  Short term goals  Time Frame for Short term goals: By discharge, pt will  Short term goal 1: perform UB ADLs at Mod I  Short term goal 2: perform LB ADLs at supervision  Short term goal 3: demo good safety awareness during functional mobility tasks at SBA and <1 verbal cue  Short term goal 4: perform 8+ min of dynamic standing balance at SBA for improved engagement in ADLS  Short term goal 5: perform 20+ min of functional activity using EC/WS PRN at SBA for improved engagement in ADLs and IADLs       Therapy Time   Individual Concurrent Group Co-treatment   Time In 1455         Time Out 1531         Minutes 36         Timed Code Treatment Minutes: [address]

## 2020-10-12 NOTE — PROGRESS NOTES
tract infection). has a past surgical history that includes Finger amputation (1966); Gastric bypass surgery (1985); Carpal tunnel release; Colonoscopy; Rotator cuff repair; joint replacement (2004 & 2005); Hemorrhoid surgery; pr egd transoral biopsy single/multiple (N/A, 5/25/2018); Upper gastrointestinal endoscopy (08/13/2018); Upper gastrointestinal endoscopy (N/A, 8/13/2018); Upper gastrointestinal endoscopy (N/A, 8/13/2018); Upper gastrointestinal endoscopy (08/16/2018); pr egd flexible foreign body removal (N/A, 8/16/2018); Upper gastrointestinal endoscopy (8/16/2018); Upper gastrointestinal endoscopy (N/A, 10/1/2018); Upper gastrointestinal endoscopy (10/1/2018); Upper gastrointestinal endoscopy (N/A, 11/19/2018); Cystoscopy (01/02/2019); and Cystoscopy (N/A, 1/2/2019).   Restrictions  Restrictions/Precautions  Restrictions/Precautions: Up as Tolerated  Required Braces or Orthoses?: No  Vision/Hearing  Vision: Impaired  Vision Exceptions: Wears glasses at all times  Hearing: Exceptions to [facility]  Hearing Exceptions: Hard of hearing/hearing concerns;Bilateral hearing aid     Subjective  General  Patient assessed for rehabilitation services?: Yes  Response To Previous Treatment: Not applicable  Family / Caregiver Present: No  Follows Commands: Within Functional Limits  Subjective  Subjective: RN and pt agreeable to PT eval  Pain Screening  Patient Currently in Pain: Yes  Pain Assessment  Pain Assessment: 0-10  Pain Level: 6  Pain Location: Chest  Vital Signs  Patient Currently in Pain: Yes     Orientation  Orientation  Overall Orientation Status: Within Normal Limits  Social/Functional History  Social/Functional History  Lives With: Spouse  Type of Home: House  Home Layout: Two level, Able to Live on Main level with bedroom/bathroom  Home Access: Stairs to enter with rails  Entrance Stairs - Number of Steps: 5  Entrance Stairs - Rails: Right  Bathroom Shower/Tub: Tub/Shower unit  Bathroom Toilet: Handicap height  Bathroom Equipment: Shower chair  Home Equipment: 4 wheeled walker  ADL Assistance: [address]: [address] [facility] Responsibilities: Yes  Ambulation Assistance: Independent  Transfer Assistance: Independent  Active : No  Patient's  Info:  Son or daughter drives  Occupation: Retired  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  Leisure & Hobbies: Likes to watch [place], walk, go to [employer]  Cognition      Objective     AROM RLE (degrees)  RLE AROM: WFL  AROM LLE (degrees)  LLE AROM : WFL  AROM RUE (degrees)  RUE AROM : WFL  AROM LUE (degrees)  LUE AROM : WFL  Strength RLE  Strength RLE: WFL  Strength LLE  Strength LLE: WFL  Strength RUE  Strength RUE: WFL  Strength LUE  Strength LUE: WFL     Sensation  Overall Sensation Status: Impaired(Reports numbness in B feet)  Bed mobility  Supine to Sit: Contact guard assistance  Sit to Supine: Contact guard assistance  Scooting: Contact guard assistance  Transfers  Sit to Stand: Contact guard assistance  Stand to sit: Contact guard assistance  Ambulation  Ambulation?: Yes  Ambulation 1  Surface: level tile  Device: Rolling Walker  Assistance: Contact guard assistance  Distance: amb 20 ft with a RW x CGA     Plan   Plan  Times per week: 5-6x wk  Current Treatment Recommendations: Strengthening, Gait Training, Stair training, Functional Mobility Training, Endurance Training, Safety Education & Training  Safety Devices  Type of devices: Bed alarm in place, Patient at risk for falls, Left in bed, Call light within reach, Nurse notified    G-Code     OutComes Score     AM-PAC Score  AM-PAC Inpatient Mobility Raw Score : 16 (10/12/20 1133)  AM-PAC Inpatient T-Scale Score : 40.78 (10/12/20 1133)  Mobility Inpatient CMS 0-100% Score: 54.16 (10/12/20 1133)  Mobility Inpatient CMS G-Code Modifier : CK (10/12/20 1133)     Goals  Short term goals  Time Frame for Short term goals: 10 visits  Short term goal 1: transfers with SBA  Short term goal 2: amb 150 ft with a RW x SBA  Short term goal 3: ascend/descend 4 steps with SBA  Short term goal 4: exercise program x SBA  Patient Goals   Patient goals : Return home     Therapy Time   Individual Concurrent Group Co-treatment   Time In 9698         Time Out 1100         Minutes 25             1 of [facility], PT

## 2020-10-13 LAB
ABSOLUTE EOS #: 0.11 K/UL (ref 0–0.4)
ABSOLUTE IMMATURE GRANULOCYTE: 0 K/UL (ref 0–0.3)
ABSOLUTE LYMPH #: 0.91 K/UL (ref 1–4.8)
ABSOLUTE MONO #: 0.51 K/UL (ref 0.1–0.8)
ANION GAP SERPL CALCULATED.3IONS-SCNC: 8 MMOL/L (ref 9–17)
BASOPHILS # BLD: 1 % (ref 0–2)
BASOPHILS ABSOLUTE: 0.06 K/UL (ref 0–0.2)
BUN BLDV-MCNC: 12 MG/DL (ref 8–23)
BUN/CREAT BLD: ABNORMAL (ref 9–20)
C-REACTIVE PROTEIN: 94.2 MG/L (ref 0–5)
CALCIUM SERPL-MCNC: 8.6 MG/DL (ref 8.6–10.4)
CHLORIDE BLD-SCNC: 99 MMOL/L (ref 98–107)
CO2: 28 MMOL/L (ref 20–31)
CREAT SERPL-MCNC: 0.66 MG/DL (ref 0.7–1.2)
DIFFERENTIAL TYPE: ABNORMAL
DIRECT EXAM: NORMAL
EOSINOPHILS RELATIVE PERCENT: 2 % (ref 1–4)
GFR AFRICAN AMERICAN: >60 ML/MIN
GFR NON-AFRICAN AMERICAN: >60 ML/MIN
GFR SERPL CREATININE-BSD FRML MDRD: ABNORMAL ML/MIN/{1.73_M2}
GFR SERPL CREATININE-BSD FRML MDRD: ABNORMAL ML/MIN/{1.73_M2}
GLUCOSE BLD-MCNC: 113 MG/DL (ref 70–99)
HCT VFR BLD CALC: 31.9 % (ref 40.7–50.3)
HEMOGLOBIN: 9.6 G/DL (ref 13–17)
IMMATURE GRANULOCYTES: 0 %
LEGIONELLA PNEUMOPHILIA AG, URINE: NEGATIVE
LYMPHOCYTES # BLD: 16 % (ref 24–44)
Lab: NORMAL
MCH RBC QN AUTO: 31.3 PG (ref 25.2–33.5)
MCHC RBC AUTO-ENTMCNC: 30.1 G/DL (ref 28.4–34.8)
MCV RBC AUTO: 103.9 FL (ref 82.6–102.9)
MONOCYTES # BLD: 9 % (ref 1–7)
MORPHOLOGY: ABNORMAL
MORPHOLOGY: ABNORMAL
NRBC AUTOMATED: 0 PER 100 WBC
PDW BLD-RTO: 14.5 % (ref 11.8–14.4)
PLATELET # BLD: 205 K/UL (ref 138–453)
PLATELET ESTIMATE: ABNORMAL
PMV BLD AUTO: 10 FL (ref 8.1–13.5)
POTASSIUM SERPL-SCNC: 3.8 MMOL/L (ref 3.7–5.3)
RBC # BLD: 3.07 M/UL (ref 4.21–5.77)
RBC # BLD: ABNORMAL 10*6/UL
SEG NEUTROPHILS: 72 % (ref 36–66)
SEGMENTED NEUTROPHILS ABSOLUTE COUNT: 4.11 K/UL (ref 1.8–7.7)
SODIUM BLD-SCNC: 135 MMOL/L (ref 135–144)
SOURCE: NORMAL
SPECIMEN DESCRIPTION: NORMAL
STREP PNEUMONIAE ANTIGEN: NEGATIVE
WBC # BLD: 5.7 K/UL (ref 3.5–11.3)
WBC # BLD: ABNORMAL 10*3/UL

## 2020-10-13 PROCEDURE — 87899 AGENT NOS ASSAY W/OPTIC: CPT

## 2020-10-13 PROCEDURE — 83516 IMMUNOASSAY NONANTIBODY: CPT

## 2020-10-13 PROCEDURE — 36415 COLL VENOUS BLD VENIPUNCTURE: CPT

## 2020-10-13 PROCEDURE — 82607 VITAMIN B-12: CPT

## 2020-10-13 PROCEDURE — 6370000000 HC RX 637 (ALT 250 FOR IP): Performed by: NURSE PRACTITIONER

## 2020-10-13 PROCEDURE — 2580000003 HC RX 258: Performed by: NURSE PRACTITIONER

## 2020-10-13 PROCEDURE — 80048 BASIC METABOLIC PNL TOTAL CA: CPT

## 2020-10-13 PROCEDURE — 94640 AIRWAY INHALATION TREATMENT: CPT

## 2020-10-13 PROCEDURE — 6370000000 HC RX 637 (ALT 250 FOR IP): Performed by: STUDENT IN AN ORGANIZED HEALTH CARE EDUCATION/TRAINING PROGRAM

## 2020-10-13 PROCEDURE — 97116 GAIT TRAINING THERAPY: CPT

## 2020-10-13 PROCEDURE — 85025 COMPLETE CBC W/AUTO DIFF WBC: CPT

## 2020-10-13 PROCEDURE — 2700000000 HC OXYGEN THERAPY PER DAY

## 2020-10-13 PROCEDURE — 87070 CULTURE OTHR SPECIMN AEROBIC: CPT

## 2020-10-13 PROCEDURE — 97110 THERAPEUTIC EXERCISES: CPT

## 2020-10-13 PROCEDURE — 6360000002 HC RX W HCPCS: Performed by: STUDENT IN AN ORGANIZED HEALTH CARE EDUCATION/TRAINING PROGRAM

## 2020-10-13 PROCEDURE — 6370000000 HC RX 637 (ALT 250 FOR IP): Performed by: INTERNAL MEDICINE

## 2020-10-13 PROCEDURE — 99232 SBSQ HOSP IP/OBS MODERATE 35: CPT | Performed by: INTERNAL MEDICINE

## 2020-10-13 PROCEDURE — 2580000003 HC RX 258: Performed by: STUDENT IN AN ORGANIZED HEALTH CARE EDUCATION/TRAINING PROGRAM

## 2020-10-13 PROCEDURE — 86038 ANTINUCLEAR ANTIBODIES: CPT

## 2020-10-13 PROCEDURE — 1200000000 HC SEMI PRIVATE

## 2020-10-13 PROCEDURE — 87449 NOS EACH ORGANISM AG IA: CPT

## 2020-10-13 PROCEDURE — APPSS30 APP SPLIT SHARED TIME 16-30 MINUTES: Performed by: PHYSICIAN ASSISTANT

## 2020-10-13 PROCEDURE — 99222 1ST HOSP IP/OBS MODERATE 55: CPT | Performed by: INTERNAL MEDICINE

## 2020-10-13 PROCEDURE — 82746 ASSAY OF FOLIC ACID SERUM: CPT

## 2020-10-13 PROCEDURE — 86738 MYCOPLASMA ANTIBODY: CPT

## 2020-10-13 PROCEDURE — 86140 C-REACTIVE PROTEIN: CPT

## 2020-10-13 PROCEDURE — 94761 N-INVAS EAR/PLS OXIMETRY MLT: CPT

## 2020-10-13 PROCEDURE — 87205 SMEAR GRAM STAIN: CPT

## 2020-10-13 RX ORDER — LEVOFLOXACIN 750 MG/1
750 TABLET ORAL DAILY
Status: DISCONTINUED | OUTPATIENT
Start: 2020-10-13 | End: 2020-10-14

## 2020-10-13 RX ORDER — TRAMADOL HYDROCHLORIDE 50 MG/1
50 TABLET ORAL EVERY 4 HOURS PRN
Status: DISCONTINUED | OUTPATIENT
Start: 2020-10-13 | End: 2020-10-18 | Stop reason: HOSPADM

## 2020-10-13 RX ORDER — BUDESONIDE AND FORMOTEROL FUMARATE DIHYDRATE 160; 4.5 UG/1; UG/1
2 AEROSOL RESPIRATORY (INHALATION) 2 TIMES DAILY
Status: DISCONTINUED | OUTPATIENT
Start: 2020-10-13 | End: 2020-10-18 | Stop reason: HOSPADM

## 2020-10-13 RX ADMIN — IPRATROPIUM BROMIDE AND ALBUTEROL SULFATE 1 AMPULE: .5; 3 SOLUTION RESPIRATORY (INHALATION) at 15:54

## 2020-10-13 RX ADMIN — GABAPENTIN 300 MG: 300 CAPSULE ORAL at 13:55

## 2020-10-13 RX ADMIN — BUMETANIDE 1 MG: 1 TABLET ORAL at 08:52

## 2020-10-13 RX ADMIN — ACETAMINOPHEN 650 MG: 325 TABLET ORAL at 09:56

## 2020-10-13 RX ADMIN — SODIUM CHLORIDE 1.5 G: 900 INJECTION INTRAVENOUS at 22:35

## 2020-10-13 RX ADMIN — FLUTICASONE PROPIONATE 2 SPRAY: 50 SPRAY, METERED NASAL at 08:53

## 2020-10-13 RX ADMIN — Medication 10 ML: at 08:51

## 2020-10-13 RX ADMIN — PANTOPRAZOLE SODIUM 40 MG: 40 TABLET, DELAYED RELEASE ORAL at 08:52

## 2020-10-13 RX ADMIN — TRAMADOL HYDROCHLORIDE 50 MG: 50 TABLET, COATED ORAL at 08:51

## 2020-10-13 RX ADMIN — SODIUM CHLORIDE 1.5 G: 900 INJECTION INTRAVENOUS at 17:32

## 2020-10-13 RX ADMIN — IPRATROPIUM BROMIDE AND ALBUTEROL SULFATE 1 AMPULE: .5; 3 SOLUTION RESPIRATORY (INHALATION) at 19:32

## 2020-10-13 RX ADMIN — THERA TABS 1 TABLET: TAB at 08:52

## 2020-10-13 RX ADMIN — ROPINIROLE HYDROCHLORIDE 0.25 MG: 0.25 TABLET, FILM COATED ORAL at 20:31

## 2020-10-13 RX ADMIN — ACETAMINOPHEN 650 MG: 325 TABLET ORAL at 00:10

## 2020-10-13 RX ADMIN — GABAPENTIN 300 MG: 300 CAPSULE ORAL at 20:31

## 2020-10-13 RX ADMIN — TRAMADOL HYDROCHLORIDE 50 MG: 50 TABLET, COATED ORAL at 22:31

## 2020-10-13 RX ADMIN — CETIRIZINE HYDROCHLORIDE 10 MG: 10 TABLET ORAL at 08:52

## 2020-10-13 RX ADMIN — RIVAROXABAN 20 MG: 20 TABLET, FILM COATED ORAL at 08:51

## 2020-10-13 RX ADMIN — ACETAMINOPHEN 650 MG: 325 TABLET ORAL at 14:06

## 2020-10-13 RX ADMIN — SODIUM CHLORIDE 1.5 G: 900 INJECTION INTRAVENOUS at 11:43

## 2020-10-13 RX ADMIN — BUDESONIDE AND FORMOTEROL FUMARATE DIHYDRATE 2 PUFF: 160; 4.5 AEROSOL RESPIRATORY (INHALATION) at 19:32

## 2020-10-13 RX ADMIN — SODIUM CHLORIDE 1.5 G: 900 INJECTION INTRAVENOUS at 05:31

## 2020-10-13 RX ADMIN — IPRATROPIUM BROMIDE AND ALBUTEROL SULFATE 1 AMPULE: .5; 3 SOLUTION RESPIRATORY (INHALATION) at 12:11

## 2020-10-13 RX ADMIN — ROPINIROLE HYDROCHLORIDE 0.25 MG: 0.25 TABLET, FILM COATED ORAL at 08:52

## 2020-10-13 RX ADMIN — ALBUTEROL 2 MG: 2 TABLET ORAL at 08:52

## 2020-10-13 RX ADMIN — METOPROLOL SUCCINATE 25 MG: 25 TABLET, FILM COATED, EXTENDED RELEASE ORAL at 20:31

## 2020-10-13 RX ADMIN — LEVOFLOXACIN 750 MG: 750 TABLET, FILM COATED ORAL at 17:32

## 2020-10-13 RX ADMIN — ALBUTEROL 2 MG: 2 TABLET ORAL at 20:31

## 2020-10-13 RX ADMIN — GABAPENTIN 300 MG: 300 CAPSULE ORAL at 08:51

## 2020-10-13 RX ADMIN — IPRATROPIUM BROMIDE AND ALBUTEROL SULFATE 1 AMPULE: .5; 3 SOLUTION RESPIRATORY (INHALATION) at 08:28

## 2020-10-13 RX ADMIN — TRAMADOL HYDROCHLORIDE 50 MG: 50 TABLET, COATED ORAL at 14:07

## 2020-10-13 RX ADMIN — SODIUM CHLORIDE 1.5 G: 900 INJECTION INTRAVENOUS at 00:06

## 2020-10-13 RX ADMIN — TRAMADOL HYDROCHLORIDE 50 MG: 50 TABLET, COATED ORAL at 02:40

## 2020-10-13 RX ADMIN — Medication 10 ML: at 20:31

## 2020-10-13 ASSESSMENT — PAIN DESCRIPTION - ORIENTATION: ORIENTATION: LEFT

## 2020-10-13 ASSESSMENT — PAIN DESCRIPTION - PROGRESSION

## 2020-10-13 ASSESSMENT — PAIN SCALES - GENERAL
PAINLEVEL_OUTOF10: 9
PAINLEVEL_OUTOF10: 8
PAINLEVEL_OUTOF10: 3
PAINLEVEL_OUTOF10: 6
PAINLEVEL_OUTOF10: 5
PAINLEVEL_OUTOF10: 5
PAINLEVEL_OUTOF10: 4
PAINLEVEL_OUTOF10: 4
PAINLEVEL_OUTOF10: 0
PAINLEVEL_OUTOF10: 5
PAINLEVEL_OUTOF10: 5
PAINLEVEL_OUTOF10: 7

## 2020-10-13 ASSESSMENT — PAIN DESCRIPTION - LOCATION
LOCATION: CHEST
LOCATION: HEAD

## 2020-10-13 ASSESSMENT — PAIN DESCRIPTION - PAIN TYPE: TYPE: ACUTE PAIN

## 2020-10-13 NOTE — CONSULTS
Cardiovascular Consult Note     TODAY'S DATE: 10/13/2020    Patient name: Julio Munoz   YOB: 1945  Date of admission:  10/11/2020       Patient seen, examined. Previous clinical entries reviewed. All available laboratory, imaging and ancillary data reviewed. Reason for Consult:  Shortness of breath    History of present Illness:     Julio Munoz is a 69 y.o. male past medical history significant for chronic diastolic heart failure, paroxysmal atrial fibrillation on Xarelto for anticoagulation for presented to [facility] with increasing shortness of breath, fatigue and lower extremity edema. He was noted to have significant bilateral pneumonia. Cardiology was consulted due to his previous cardiac history of heart failure. He currently denies any acute chest pain. His admission electrocardiogram showed atrial fibrillation with controlled rate. Since his admission,  he has felt better. His past medical history is significant for paroxysmal atrial fibrillation with control of rhythm on higher doses of AV iris blocking agents and on Xarelto for anticoagulation. He has a history of going into difficult to control atrial fibrillation when he becomes hypoxemic.     Past Medical History:    has a past medical history of Acute superficial gastritis without hemorrhage, Anastomotic stricture of stomach, Asthma, Atrial fibrillation ([facility] [address] Calculus of gallbladder without cholecystitis without obstruction, Chronic diastolic heart failure (HCC), Chronic rhinosinusitis, Class 2 severe obesity due to excess calories with serious comorbidity and body mass index (BMI) of 36.0 to 36.9 in [facility]), E. coli septicemia ([facility] [address] E. coli UTI, Foot drop, right, Fracture of femur ([facility] [address] Gait disorder, Gastric out let obstruction, GERD (gastroesophageal reflux disease), Hallux valgus, acquired, bilateral, Hyperlipidemia, Hypertension, Impaired hearing, Internal hemorrhoids, Lymphedema of both lower extremities, Nausea & vomiting, OA (osteoarthritis) of knee, bilateral, Obesity, MARIAMA on CPAP, Osteoarthritis, Osteoarthritis of lumbar spine, S/P revision of total knee, Septicemia due to E. coli (HCC), Simple chronic bronchitis (HCC), Slow transit constipation, Unspecified sleep apnea, and UTI (urinary tract infection).     Surgical History:     Past Surgical History:   Procedure Laterality Date    CARPAL TUNNEL RELEASE      bilateral    COLONOSCOPY      CYSTOSCOPY  01/02/2019    by Dr. Priti Kerr N/A 1/2/2019    CYSTOSCOPY performed by Greg Kurtz MD at [address]    first knuckle right index finger   [address] [facility]    vertical banded gastroplasty   [address] REPLACEMENT  2004 & 2005    bilateral knees    [place] EGD [address] N/A 8/16/2018    EGD FOREIGN BODY REMOVAL performed by Cookie Mayo MD at [address] EGD TRANSORAL BIOPSY SINGLE/MULTIPLE N/A 5/25/2018    EGD BIOPSY performed by Denise Martinez DO at [address] [facility]      left shoulder    UPPER GASTROINTESTINAL ENDOSCOPY  08/13/2018    removal of food bolus    UPPER GASTROINTESTINAL ENDOSCOPY N/A 8/13/2018    EGD FOREIGN BODY REMOVAL performed by Denise Martinez DO at [address] [facility] N/A 8/13/2018    EGD BIOPSY performed by Denise Martinez DO at [address] [facility]  08/16/2018    egd with balloon dilitation    UPPER GASTROINTESTINAL ENDOSCOPY  8/16/2018    EGD DILATION [facility]ALLOON [facility] 10/1/2018    EGD BIOPSY performed by Falguni Martin MD at [address] [place] [address]  10/1/2018    EGD DILATION BALLOON performed by Falguni Martin MD at [address] [place] [address] 11/19/2018    EGD DILATION BALLOON performed by Falguni Martin MD at [facility] End[facility]       Medications:   Scheduled Meds:   levoFLOXacin  750 mg Oral Daily    budesonide-formoterol  2 puff Inhalation BID    albuterol  2 mg Oral BID    bumetanide  1 mg Oral Daily    cetirizine  10 mg Oral Daily    fluticasone  2 spray Nasal Daily    gabapentin  300 mg Oral TID    multivitamin  1 tablet Oral Daily    metoprolol succinate  25 mg Oral BID    pantoprazole  40 mg Oral Daily    [Held by provider] potassium chloride  20 mEq Oral BID    [Held by provider] rivaroxaban  20 mg Oral Daily    rOPINIRole  0.25 mg Oral BID    [Held by provider] spironolactone  25 mg Oral Daily    sodium chloride flush  10 mL Intravenous 2 times per day    ipratropium-albuterol  1 ampule Inhalation Q4H WA    ampicillin-sulbactam  1.5 g Intravenous Q6H     Continuous Infusions:   No outpatient medications have been marked as taking for the 10/11/20 encounter [facility] Encounter). Allergies:   Darvocet [propoxyphene n-acetaminophen]; Other; and Oxycodone-acetaminophen    Social History:    reports that he quit smoking about 43 years ago. He has a 20.00 pack-year smoking history. He has never used smokeless tobacco. He reports that he does not drink alcohol or use drugs. Family History:    family history includes Cancer in his mother; Heart Attack in his father. Review of Systems:     Constitutional: No fever/chills. HENT: No headache, neck pain or neck stiffnes. No sore throat or dysphagia. Eyes: No blurred vision. Respiratory: As above. Cardiovascular: As above. Gastrointestinal: Negative. Genitourinary: Negative  Endocrine: Negative. Musculoskeletal: Negative. Skin: Negative. Allergic/Immunologic: Negative. Neurologic: Negative. Hematological: Negative. Psychiatric: Negative. All other systems are are noted to be otherwise negative.       Physical Exam:   /67   Pulse 66   Temp 98 °F (36.7 °C) (Oral)   Resp 18   Ht 5' 10\" (1.778 m)   Wt 219 lb (99.3 kg)   SpO2 100% BMI 31.42 kg/m²     Intake/Output Summary (Last 24 hours) at 10/13/2020 1700  Last data filed at 10/13/2020 1445  Gross per 24 hour   Intake 1172 ml   Output 1500 ml   Net -328 ml       GENERAL:  Alert, appropriate, oriented, in NAD. HEENT:  Head is atraumatic and normocephalic. Positive for Pallor. No icterus. NECK: Supple without any thyromegaly. LUNGS: Generally  Decreased breath sounds. + Few rhonchi. CARDIAC: S1, S2, RRR. ABD:  Soft non-tender . EXT: Positive for edema. MS: No obvious deformities. SKIN: No obvious skin rashes. NEURO: No focal neurologic deficits. Labs/ Ancillary data:     CBC:   Recent Labs     10/11/20  0938 10/13/20  0927   WBC 9.2 5.7   HGB 11.6* 9.6*    205     BMP:    Recent Labs     10/11/20  0938 10/13/20  0927    135   K 3.6* 3.8    99   CO2 28 28   BUN 23 12   CREATININE 1.01 0.66*   GLUCOSE 111* 113*     Hepatic:   Recent Labs     10/11/20  0938   AST 19   ALT 9   BILITOT 1.12   ALKPHOS 126     Troponin:   Recent Labs     10/11/20  1207   TROPONINT NOT REPORTED   INR:   Recent Labs     10/11/20  0938   INR 1.3       Imaging:    His last electrocardiogram, echocardiogram, chest x-ray and CT scan of the chest were reviewed. Impression :     Multilobar pneumonia. Acute on chronic diastolic heart failure. Paroxysmal atrial fibrillation. Plan :     Treatment for pneumonia. Diuresis as tolerated. Supplement Lytes. Will follow. Thank you very much for allowing us to participate in the care of this patient. Please call us with any questions.     Electronically signed by Naina Villarreal MD on 10/13/2020 at 5:12 PM

## 2020-10-13 NOTE — CARE COORDINATION
Case Management Initial Discharge Plan  Ressie [place],             Met with:patient to discuss discharge plans. Information verified: address, contacts, phone number, , insurance Yes    Emergency Contact/Next of Kin name & number:     Shelli Felix [phone]  Jason Ziegler unable to verify ph# on facesheet    PCP: Min Ramirez PA-C  Date of last visit:     Insurance Provider: [facility]    Discharge Planning    Living Arrangements:  Spouse/Significant Other, Children   Support Systems:  Spouse/Significant Other, Children    Home has 2 stories  5 stairs to climb to get into front door, stairs to climb to reach second floor  Location of bedroom/bathroom in home main floor    Patient able to perform ADL's:Independent    Current Services (outpatient & in home) [facility])  DME equipment: CPAP,Nebulizer, cane, walker, w/c, shower bench, raised toilet seat, railings throughout house  DME provider:     Receiving oral anticoagulation therapy? Yes    If indicated:   Physician managing anticoagulation treatment: Cee Medina NP  Where does patient obtain lab work for ATC treatment? [facility]    Potential Assistance Needed:  Other (Comment), Durable Medical Equipment(resume home care)    Patient agreeable to home care: Yes  Freedom of choice provided:  yes    Prior SNF/Rehab Placement and Facility:   Agreeable to SNF/Rehab: n/a  [place] of choice provided: n/a     Evaluation: n/a    Expected Discharge date:  10/16/20    Patient expects to be discharged to:  home with spouse  Follow Up Appointment: Best Day/ Time: Monday AM    Transportation provider: n/a  Transportation arrangements needed for discharge: No    Readmission Risk              Risk of Unplanned Readmission:        18           Does patient have a readmission risk score greater than 14?: Yes  If yes, follow-up appointment must be made within 7 days of discharge.      Goals of Care:     Improved respiratory status      Discharge Plan:     Home independently with spouse    Resume [address] for O[address] needs      Electronically signed by Prachi Gregory RN on 10/13/20 at 12:55 PM EDT

## 2020-10-13 NOTE — PROGRESS NOTES
[facility]  Office: [address], DO, Abdi Kras, DO, Antonette Ng, DO, [place]thia Blood, DO, Marietta Corral MD, Suman Chaparro MD, Abelardo Hawkins MD, Ifeanyi Benitez MD, Tiffani Burciaga MD, Jose Rey MD, Lilliam Goins MD, Lashanda Srinivasan MD, Marisela Tadeo MD, Mary Rodriguez, DO, Loraine Juarez MD, Momo Perales MD, Mela Rey, DO, Frandy Morales MD,  Dinesh Gardner, DO, Espinoza Ventura MD, Mik Harp MD, [facility], [facility], CNP, Andrea Dos Santos CNP, Shelby Puga, CNS, Van Elena, CNP, Andriy Carlos, CNP, Lucius Daniels, CNP, Aleah Jha, CNP, Fifi Mclaughlin, CNP, Amber Snyder PA-C, Kasia Elizabeth DNP, Rory Porter, CNP, Ordarlene Soda, CNP, Wyrajan Husbands, CNP, Colletta Moros, CNP, Ras Sanon, [facility]   [address] [facility]    Progress Note    10/13/2020    12:09 PM    Name:   Julio Munoz  MRN:     [id number]     Acct:      [de-identified]   Room:   [facility]04[facility] Day:  2  Admit Date:  10/11/2020  9:03 AM    PCP:   Bairon Marie PA-C  Code Status:  Full Code    Subjective:     C/C:   Chief Complaint   Patient presents with    Shortness of Breath    Fever     Interval History Status: improved. Pt seen and evaluated this a.m. He has no new complaints. States that overall he feels better. Continues to have cough with occasional blood clumps. Patient reports that he has coughed up blood previously, specifically the last time he was admitted to the hospital for pneumonia. He continues to require oxygen to maintain appropriate saturation. He is afebrile and hemodynamically stable. I asked him whether he sometimes has difficulty swallowing his food and he admittedly does occasionally aspirate. He reports that he had a swallow study well admitted to [facility] in July.   I have reviewed those records which did suggest aspiration and recommendation was for dysphagia 6 with thin ampule Inhalation Q4H WA    ampicillin-sulbactam  1.5 g Intravenous Q6H     Continuous Infusions:   PRN Meds: traMADol, acetaminophen, ipratropium-albuterol, sodium chloride flush, magnesium hydroxide, promethazine **OR** ondansetron, polyethylene glycol    Data:     Past Medical History:   has a past medical history of Acute superficial gastritis without hemorrhage, Anastomotic stricture of stomach, Asthma, Atrial fibrillation ([facility] Calculus of gallbladder without cholecystitis without obstruction, Chronic diastolic heart failure (HCC), Chronic rhinosinusitis, Class 2 severe obesity due to excess calories with serious comorbidity and body mass index (BMI) of 36.0 to 36.9 in adult ([facility] [facility] E. [facility] septicemia [facility] E. coli UTI, Foot drop, right, Fracture of femur ([facility] [facility] Gait disorder, Gastric out let obstruction, GERD (gastroesophageal reflux disease), Hallux valgus, acquired, bilateral, Hyperlipidemia, Hypertension, Impaired hearing, Internal hemorrhoids, Lymphedema of both lower extremities, Nausea & vomiting, OA (osteoarthritis) of knee, bilateral, Obesity, MARIAMA on CPAP, Osteoarthritis, Osteoarthritis of lumbar spine, S/P revision of total knee, Septicemia due to E. coli ([facility]), Simple chronic bronchitis ([facility]), Slow transit constipation, Unspecified sleep apnea, and UTI (urinary tract infection). Social History:   reports that he quit smoking about 43 years ago. He has a 20.00 pack-year smoking history. He has never used smokeless tobacco. He reports that he does not drink alcohol or use drugs. Family History:   Family History   Problem Relation Age of Onset   [facility] Cancer Mother     Heart Attack Father        Vitals:  /69   Pulse 83   Temp 98.1 °F (36.7 °C) (Oral)   Resp 12   Ht 5' 10\" (1.778 m)   Wt 217 lb (98.4 kg)   SpO2 99%   BMI 31.14 kg/m²   Temp (24hrs), Av.5 °F (36.9 °C), Min:98.1 °F (36.7 °C), Max:98.8 °F (37.1 °C)    No results for input(s): POCGLU in the last 72 hours.     I/O (24Hr): Intake/Output Summary (Last 24 hours) at 10/13/2020 1209  Last data filed at 10/13/2020 1006  Gross per 24 hour   Intake 1172 ml   Output 1000 ml   Net 172 ml       Labs:  Hematology:  Recent Labs     10/11/20  0938 10/13/20  0927   WBC 9.2 5.7   RBC 3.77* 3.07*   HGB 11.6* 9.6*   HCT 37.4* 31.9*   MCV 99.2 103.9*   MCH 30.8 31.3   MCHC 31.0 30.1   RDW 14.7* 14.5*    205   MPV 8.9 10.0   CRP 5.6*  --    INR 1.3  --    DDIMER 0.74  --      Chemistry:  Recent Labs     10/11/20  0938 10/11/20  1207 10/13/20  0927     --  135   K 3.6*  --  3.8     --  99   CO2 28  --  28   GLUCOSE 111*  --  113*   BUN 23  --  12   CREATININE 1.01  --  0.66*   ANIONGAP 10  --  8*   LABGLOM >60  --  >60   GFRAA >60  --  >60   CALCIUM 9.1  --  8.6   PROBNP 4,458*  --   --    TROPHS 51* 50*  --      Recent Labs     10/11/20  0938   PROT 7.3   LABALBU 4.0   AST 19   ALT 9      ALKPHOS 126   BILITOT 1.12   LIPASE 11*     ABG:No results found for: POCPH, PHART, PH, POCPCO2, GDJ1VKO, PCO2, POCPO2, PO2ART, PO2, POCHCO3, GMP5UBT, HCO3, NBEA, PBEA, BEART, BE, THGBART, THB, UEZ1WAD, MQCE1MYG, A7GDDCLL, O2SAT, FIO2  Lab Results   Component Value Date/Time    SPECIAL NOT REPORTED 10/11/2020 12:13 PM     Lab Results   Component Value Date/Time    CULTURE NO GROWTH 10/11/2020 12:13 PM       Radiology:  Xr Chest Portable    Result Date: 10/11/2020  Multifocal infiltrate in the right lung and also likely in the left lung base. This may represent an inflammatory process or infection. Ct Chest Pulmonary Embolism W Contrast    Result Date: 10/11/2020  1. Findings most likely represent multifocal pneumonia, right greater than left as detailed above. Follow-up is recommended to document resolution. 2. Upper abdominal free fluid, the significance of which is unclear. 3. No evidence for pulmonary embolus. 4. Four-chamber cardiac enlargement/cardiomegaly. Coronary artery disease.  5. Subcarinal lymph node enlargement measuring up to 2.1 x 2.4 cm. Mildly enlarged right hilar lymph node. 6. Cholelithiasis. 7. Postsurgical changes in association with the stomach. 8. Hypodense thyroid nodule measuring 2.5 x 1.3 cm. RECOMMENDATIONS: 2.5 cm incidental thyroid nodule. Recommend thyroid US. Reference: J Am Minda Radiol. 2015 Feb;12(2): 143-50       Physical Examination:        General appearance:  alert, cooperative and no distress  Mental Status:  oriented to person, place and time and normal affect  Lungs: Right mid and lower lung field crackles, left lower lung field crackles. Heart:  regular rate and rhythm, no murmur  Abdomen:  soft, nontender, nondistended, normal bowel sounds, no masses, hepatomegaly, splenomegaly  Extremities:  no edema, redness, tenderness in the calves  Skin:  no gross lesions, rashes, induration    Assessment:        Hospital Problems           Last Modified POA    * (Principal) Pneumonia due to organism 10/11/2020 Yes    Atrial fibrillation ([address] 10/11/2020 Yes    GERD (gastroesophageal reflux disease) 10/11/2020 Yes    Hypertension 10/11/2020 Yes    MARIAMA on CPAP 10/11/2020 Yes    COPD (chronic obstructive pulmonary disease) ([address] 10/11/2020 Yes    Chronic diastolic heart failure ([address] 10/11/2020 Yes    H/O gastric bypass 10/11/2020 Yes          Plan:        #Aspiration pneumonia/hemoptysis/Subcarinal lymph node enlargement measuring up to 2.1 x 2.4 cm. Mildly enlarged right hilar lymph node. - treat aspiration pneumonia with zosyn  - obtain sputum culture  - will need repeat imaging and follow-up with his pulmonologist, Dr. Niki Carson to further evaluate lymphadenopathy and hemoptysis  - obtain mycoplasma, strep pneumo antigen    #Atrial fibrillation/HFpEF/Troponin elevation  - consultation placed to cardiology, soy Evans.  Will hold xarelto given hemoptysis  - aldactone currently on hold    #COPD  - continue home inhaler regimen  - wean O2 as tolerated    #Hx gastric bypass    #Anemia  - macrocytic anemia, will check B12/folate. He could be deficient given hx gastric bypass  - check iron studies  - will continue to monitor hemoglobin, will hold xarelto for now given hemoptysis    #Hx of aspiration  - he has had evidence of aspiration on swallow study during last hospital admission. Recommendation was for dysphagia 6, thin liquids.  Pt is aware and accepts risk of aspiration    #Thyroid mass  - recommend ultrasound as an outpatient to further assess thyroid mass    #Monitor electrolytes, replete as necessary  #PT/OT assessment    Carmen Leach PA-C  10/13/2020  12:09 PM

## 2020-10-13 NOTE — CONSULTS
PULMONARY & CRITICAL CARE MEDICINE CONSULT NOTE     Patient:  Ronald Escamilla  MRN: [id number]  Admit date: 10/11/2020  Primary Care Physician: Nelson Montiel PA-C  Consulting Physician: María Elena Hwang DO  CODE Status: Full Code     HISTORY     CHIEF COMPLAINT/REASON FOR CONSULT: Hemoptysis / recurrent pneumonia. HISTORY OF PRESENT ILLNESS:  The patient is a 69 y.o. male with a significant past medical history of aspiration pneumonia, atrial fibrillation, CHF, MARIAMA on CPAP presented to the hospital with shortness of breath and fever. Patient was admitted for the management of pneumonia. Patient noted to have COPD what he felt as a COPD exacerbation. On Sunday patient noted to have an episode of coughing that led to emesis while his CPAP mask was on and he believed he aspirated some vomitus. This led to shortness of breath over the next couple of days. Episode of hemoptysis with streaked red blood in the sputum. Patient is a former smoker. No recent travel or surgeries. Though recent hospitalizations in June of this year. Has some mild pleuritic chest pain in the left side. Increased shortness of breath. Denies abdominal pain, diarrhea, focal neurological deficits, unintentional weight loss, dysphagia, odynophagia, hematemesis. Patient presented with a low-grade fever, nontachycardic, and hemodynamically stable. Room air saturating well. Initial BMP unremarkable. proBNP elevated at 4000. Troponin 50. Hepatic panel negative. BC demonstrated normocytic anemia. No leukocytosis. COVID-19 negative. Respiratory cultures negative thus far. Pneumonia antigen negative. Chest x-ray on 10/11/2020 demonstrated multifocal infiltrate in the right lung as well as left lung base. CT chest on 10/11 showed multifocal pneumonia right greater than left. No evidence of pulmonary embolism. Cardiomegaly noted. There was subcarinal lymph node enlargement measuring 2.1 x 2.4 cm.   Mildly enlarged right hilar lymph node. Of note patient has a history of prior gastric banding in  with complications of gastric outlet obstruction requiring intermittent dilations. EGD in 10/2018 with dilation to 15 mm. Patient had another EGD 2018 with banding. Patient has also been treated for multiple pneumonias. In 2020 patient was treated for bradycardia and chf exacerbation at [facility]. Patient was also treated with vancomycin /zosyn x 7 days for possible aspiration pneumonia. Patient was then monitored off abx. Admitted again at [facility] and treated for MDR enterobacter from the sputum that grew from the facility at [facility] with meropenem. Patient discharged on meropenem  Until 2020. Currently patient is on unasyn at this time (day 3). Interval History:  10/13/20  Patient is currently on @ O2 Flow Rate (L/min)  Av L/min  Min: 2 L/min  Max: 2 L/min  T-max is . TMAX[24, and the white count is   WBC   Date Value Ref Range Status   10/13/2020 5.7 3.5 - 11.3 k/uL Final       PAST MEDICAL HISTORY:        Diagnosis Date    Acute superficial gastritis without hemorrhage 2018    Anastomotic stricture of stomach     Asthma     Atrial fibrillation ([address]     On Xarelto 2020    Calculus of gallbladder without cholecystitis without obstruction 2017    Chronic diastolic heart failure ([address] 2017    Chronic rhinosinusitis 2015    Class 2 severe obesity due to excess calories with serious comorbidity and body mass index (BMI) of 36.0 to 36.9 in adult ([address] 2017    E. coli septicemia ([address]     E. coli UTI     Foot drop, right 7/10/2012    Fracture of femur ([address] 10/23/2016    Gait disorder 2016    Gastric out let obstruction     GERD (gastroesophageal reflux disease)     Hallux valgus, acquired, bilateral 2015    Hyperlipidemia     Hypertension     Impaired hearing 2015    Internal hemorrhoids 1/3/2017    Lymphedema of both lower extremities 6/24/2015    Nausea & vomiting 11/16/2018    OA (osteoarthritis) of knee, bilateral 12/11/2006    Obesity     MARIAMA on CPAP 5/2/2017    Osteoarthritis     Osteoarthritis of lumbar spine 12/13/2007     replace inactive diagnosis    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 coli (HCC)     Due to UTI     Simple chronic bronchitis ([address] 4/10/2018    Slow transit constipation 11/3/2016    Unspecified sleep apnea     UTI (urinary tract infection)      PAST SURGICAL HISTORY:        Procedure Laterality Date    CARPAL TUNNEL RELEASE      bilateral    COLONOSCOPY      CYSTOSCOPY  01/02/2019    by Dr. Priti Kerr N/A 1/2/2019    CYSTOSCOPY performed by Greg Kurtz MD at [address]    first knuckle right index finger   [facility]    vertical banded gastroplasty   [address] REPLACEMENT  2004 & 2005    bilateral knees    TX EGD [address] N/A 8/16/2018    EGD FOREIGN BODY REMOVAL performed by Cookie Myao MD at [address] EGD TRANSORAL BIOPSY SINGLE/MULTIPLE N/A 5/25/2018    EGD BIOPSY performed by Denise Martinez DO at [address]      left shoulder    UPPER GASTROINTESTINAL ENDOSCOPY  08/13/2018    removal of food bolus    UPPER GASTROINTESTINAL ENDOSCOPY N/A 8/13/2018    EGD FOREIGN BODY REMOVAL performed by Denise Martinez DO at [facility] 8/13/2018    EGD BIOPSY performed by Denise Martinez DO at [place] [address]  08/16/2018    egd with balloon dilitation    UPPER GASTROINTESTINAL ENDOSCOPY  8/16/2018    EGD DILATION BALLOON performed by Cookie Mayo MD at [address] [facility] 10/1/2018    EGD BIOPSY performed by Falguni Martin MD at [address] [place] [address]  10/1/2018    EGD DILATION BALLOON performed by [facility] Rafa Dockery MD at [facility]    UPPER GASTROINTESTINAL ENDOSCOPY N/A 11/19/2018    EGD DILATION BALLOON performed by Sumaya Zuñiga MD at [facility]     FAMILY HISTORY:       Problem Relation Age of Onset    Cancer Mother     Heart Attack Father      SOCIAL HISTORY:   TOBACCO:   reports that he quit smoking about 43 years ago. He has a 20.00 pack-year smoking history. He has never used smokeless tobacco.  ETOH:  reports no history of alcohol use. DRUGS: reports no history of drug use. ALLERGIES:    Allergies   Allergen Reactions    Darvocet [Propoxyphene N-Acetaminophen] Hives    Other Hives    Oxycodone-Acetaminophen          HOME MEDICATIONS:  Prior to Admission medications    Medication Sig Start Date End Date Taking? Authorizing Provider   bumetanide (BUMEX) 1 MG tablet TAKE 2 TABLETS BY MOUTH ON MONDAY, WEDNESDAY AND FRIDAY.  TAKE 1 TABLET BY MOUTH ON ALL OTHER DAYS 9/17/20   Lynn Christine PA-C   rOPINIRole (REQUIP) 0.25 MG tablet TAKE 1 TABLET BY MOUTH TWICE DAILY 8/20/20   Lynn Christine PA-C   gabapentin (NEURONTIN) 300 MG capsule TAKE 1 CAPSULE BY MOUTH THREE TIMES DAILY 9/18/20 11/17/20  Lynn Christine PA-C   vitamin B-12 (CYANOCOBALAMIN) 100 MCG tablet TAKE 1 TABLET BY MOUTH DAILY AS NEEDED FOR FATIGUE 7/23/20   Lynn Christine PA-C   tiZANidine (ZANAFLEX) 4 MG tablet TAKE 1 TABLET BY MOUTH DAILY AS NEEDED 7/23/20   Lynn Christine PA-C   spironolactone (ALDACTONE) 25 MG tablet Take 1 tablet by mouth daily 7/16/20   Lynn Christine PA-C   metoprolol succinate (TOPROL XL) 25 MG extended release tablet Take 1 tablet by mouth 2 times daily 7/16/20   Lynn Christine PA-C   rivaroxaban (XARELTO) 20 MG TABS tablet Take 1 tablet by mouth daily 7/16/20   Lynn Christine PA-C   pantoprazole (PROTONIX) 40 MG tablet Take 1 tablet by mouth daily 7/16/20   Lynn Christine PA-C   potassium chloride (KLOR-CON M) 20 MEQ extended release tablet Take 1 tablet by mouth 2 times daily 7/16/20   BOZENA Olea-C   Multiple Vitamin (MULTI-VITAMINS) TABS Take 1 tablet by mouth daily 7/16/20   BOZENA Olea-ERASTO   cetirizine (ZYRTEC) 10 MG tablet TAKE 1 TABLET BY MOUTH DAILY 5/27/20   Kym Martell PA-C   fluticasone [facility]) 50 MCG/ACT nasal spray 2 sprays by Nasal route daily 3/5/20   BOZENA Olea-C   guaiFENesin (SM MUCUS RELIEF) 600 MG extended release tablet Take 1 tablet by mouth 2 times daily 3/5/20   DARIANA OleaC   D-Mannose 500 MG CAPS Take 500 mg by mouth 3 times daily 11/11/19 12/11/19  MD SHEREE Mc LUBRICANT EYE DROPS 0.4-0.3 % ophthalmic solution PLACE 1 DROP INTO BOTH EYES FOUR TIMES DAILY AS NEEDED FOR DRY EYES 10/17/19   BOZENA Olea-C   Fluticasone furoate-vilanterol (BREO ELLIPTA) 200-25 MCG/INH AEPB inhaler Inhale 1 puff into the lungs daily    Historical Provider, MD   Handicap Placard MISC by Does not apply route 6/27/19   Kym Martell PA-C   Incontinence Supply Disposable (INCONTINENCE BRIEF LARGE) MISC To use as directed. Use 3x a day 4/30/19   Kym Martell PA-C   polyethylene glycol [facility]) powder Take 17 g by mouth daily 3/5/19   Kym Martell PA-C   Foot Care Products (TRI-BALANCE ORTHOTICS MENS) [address] [facility] Provide insurance covered orthotics shoes, wear daily 12/12/18   Kym Martell PA-C   albuterol (PROVENTIL) 2 MG tablet Use as needed 10/4/18   Kathia Mitchell MD   ipratropium-albuterol (DUONEB) 0.5-2.5 (3) MG/3ML SOLN nebulizer solution Inhale 1 vial into the lungs every 4 hours as needed     Historical Provider, MD   Armodafinil 200 MG TABS Take 1 tablet by mouth 2 times daily.   4/2/18   Historical Provider, MD     IMMUNIZATIONS:  Most Recent Immunizations   Administered Date(s) Administered    Influenza Vaccine, unspecified formulation 01/02/2015    Influenza Virus Vaccine 01/02/2015    Influenza, Quadv, IM, PF (6 mo and older Fluzone, Flulaval, Fluarix, and 3 yrs and older Afluria) 2019    Pneumococcal Conjugate 13-valent ([id number]) 2018    Pneumococcal Polysaccharide ([id number]) 10/31/2019    Tdap (Boostrix, Adacel) 2019       REVIEW OF SYSTEMS:  Unobtainable from patient due to sedation/mechanical ventilation    PHYSICAL EXAMINATION     VITAL SIGNS:   LAST-  /69   Pulse 83   Temp 98.1 °F (36.7 °C) (Oral)   Resp 12   Ht 5' 10\" (1.778 m)   Wt 217 lb (98.4 kg)   SpO2 99%   BMI 31.14 kg/m²   8-24 HR RANGE-  TEMP Temp  Av.5 °F (36.9 °C)  Min: 98.1 °F (36.7 °C)  Max: 98.8 °F (62.8 °C)   BP Systolic (91BEG), RRA:188 , Min:100 , AFN:709      Diastolic (83ZWI), NFX:69, Min:53, Max:69     PULSE Pulse  Av.3  Min: 72  Max: 83   RR Resp  Av  Min: 12  Max: 12   O2 SAT SpO2  Av %  Min: 99 %  Max: 99 %   OXYGEN DELIVERY O2 Flow Rate (L/min)  Av L/min  Min: 2 L/min  Max: 2 L/min     Ventilator Settings:  Vent Information  SpO2: 99 %    SYSTEMIC EXAMINATION:   General appearance - cooperative. Mental status - A&O x 3   Eyes - pupils equal and reactive, sclera anicteric  Mouth - mucous membranes moist, pharynx normal without lesions  Neck - supple, no significant adenopathy, carotids upstroke normal bilaterally, no bruits  Chest - Course breath sounds on the right lung field. Diminished breath sounds.    Heart - normal rate, regular rhythm, normal S1, S2, no murmurs, rubs, clicks or gallops  Abdomen - soft, nontender, nondistended, no masses or organomegaly  Neurological - DTR's normal and symmetric, motor and sensory grossly normal bilaterally  Extremities - peripheral pulses normal, no pedal edema, no clubbing or cyanosis  Skin - normal coloration and turgor, no rashes, no suspicious skin lesions noted     DATA REVIEW     Medications: Current Inpatient  Scheduled Meds:   levoFLOXacin  750 mg Oral Daily    albuterol  2 mg Oral BID    bumetanide  1 mg Oral Daily    cetirizine  10 mg Oral Daily    fluticasone  2 spray Nasal Daily    gabapentin 300 mg Oral TID    multivitamin  1 tablet Oral Daily    metoprolol succinate  25 mg Oral BID    pantoprazole  40 mg Oral Daily    [Held by provider] potassium chloride  20 mEq Oral BID    [Held by provider] rivaroxaban  20 mg Oral Daily    rOPINIRole  0.25 mg Oral BID    [Held by provider] spironolactone  25 mg Oral Daily    sodium chloride flush  10 mL Intravenous 2 times per day    ipratropium-albuterol  1 ampule Inhalation Q4H WA    ampicillin-sulbactam  1.5 g Intravenous Q6H     Continuous Infusions:  INPUT/OUTPUT:  In: 5075 [I.V.:1172]  Out: 1000 [Urine:1000]  Date 10/13/20 0000 - 10/13/20 2359   Shift 2785-6454 6017-5729 2772-6159 24 Hour Total   INTAKE   I.V.(mL/kg) 300(3)   300(3)   Shift Total(mL/kg) 300(3)   300(3)   OUTPUT   Urine(mL/kg/hr) 500(0.6) 500  1000   Shift Total(mL/kg) 500(5.1) 500(5.1)  1000(10.2)   Weight (kg) 98.4 98.4 98.4 98.4       LABS:-  ABG:   No results for input(s): POCPH, POCPCO2, POCPO2, POCHCO3, TZSV1GLZ in the last 72 hours. CBC:   Recent Labs     10/11/20  0938 10/13/20  0927   WBC 9.2 5.7   HGB 11.6* 9.6*   HCT 37.4* 31.9*   MCV 99.2 103.9*    205   LYMPHOPCT 4* 16*   RBC 3.77* 3.07*   MCH 30.8 31.3   MCHC 31.0 30.1   RDW 14.7* 14.5*     BMP:   Recent Labs     10/11/20  0938 10/13/20  0927    135   K 3.6* 3.8    99   CO2 28 28   BUN 23 12   CREATININE 1.01 0.66*   GLUCOSE 111* 113*     Liver Function Test:   Recent Labs     10/11/20  0938   PROT 7.3   LABALBU 4.0   ALT 9   AST 19   ALKPHOS 126   BILITOT 1.12     Amylase/Lipase:  Recent Labs     10/11/20  0938   LIPASE 11*     Coagulation Profile:   Recent Labs     10/11/20  0938   INR 1.3   PROTIME 13.7*   APTT 28.0     Cardiac Enzymes:  No results for input(s): CKTOTAL, CKMB, CKMBINDEX, TROPONINI in the last 72 hours.   Lactic Acid:  Lab Results   Component Value Date    LACTA 1.4 05/24/2018     BNP:   No results found for: BNP  D-Dimer:  Lab Results   Component Value Date    DDIMER 0.74 10/11/2020 Others:   Lab Results   Component Value Date    TSH 2.74 01/04/2016    Y1PLWPO 3.9 (L) 01/04/2016     Lab Results   Component Value Date    CRP 5.6 (H) 10/11/2020     No results found for: Fay Marks  Lab Results   Component Value Date    FERRITIN 28 (L) 10/11/2020     No results found for: SPEP, UPEP  Lab Results   Component Value Date    PSA 0.30 10/30/2019     Microbiology:    Pathology:    Radiology Reports:  CT CHEST PULMONARY EMBOLISM W CONTRAST   Final Result   1. Findings most likely represent multifocal pneumonia, right greater than   left as detailed above. Follow-up is recommended to document resolution. 2. Upper abdominal free fluid, the significance of which is unclear. 3. No evidence for pulmonary embolus. 4. Four-chamber cardiac enlargement/cardiomegaly. Coronary artery disease. 5. Subcarinal lymph node enlargement measuring up to 2.1 x 2.4 cm. Mildly   enlarged right hilar lymph node. 6. Cholelithiasis. 7. Postsurgical changes in association with the stomach. 8. Hypodense thyroid nodule measuring 2.5 x 1.3 cm. RECOMMENDATIONS:   2.5 cm incidental thyroid nodule. Recommend thyroid US. Reference: J Am Minda Radiol. 2015 Feb;12(2): 143-50         XR CHEST PORTABLE   Final Result   Multifocal infiltrate in the right lung and also likely in the left lung   base. This may represent an inflammatory process or infection. Pulmonary Function test:    Polysomnogram:    Echocardiogram:   No results found for this or any previous visit. Cardiac Catheterization:   No results found for this or any previous visit.     ASSESSMENT AND PLAN     Assessment:    // Acute Hypoxic Respiratory distress likely secondary to aspiration  // Chemical Pneumonitis vs Aspiration Pneumonia.   // History of Gastric outlet obstruction as complication of bariatric banding.   // Subcarinal and hilar adenopathy  // COPD   // Tobacco abuse  // MARIAMA  //HTN  // HLD  //CHF PO,continue Unasyn asa well. Will get CANCA and REED    Treatment plan Discussed with nursing staff in detail , all questions answered . Electronically signed by Racquel Beck MD on   10/13/20 at 4:51 PM EDT    Please note that this chart was generated using voice recognition Dragon dictation software. Although every effort was made to ensure the accuracy of this automated transcription, some errors in transcription may have occurred. Please note that this chart was generated using voice recognition Dragon dictation software. Although every effort was made to ensure the accuracy of this automated transcription, some errors in transcription may have occurred.

## 2020-10-13 NOTE — CARE COORDINATION
10/13/20 1251   Discharge [address] Spouse/Significant Other;Children   Current Services Prior To Admission Durable Medical Equipment;Home Care   DME Cane;Cpap;Home Aerosol; Shower Chair;Walker; Wheelchair  (raised toilet seat,railings)   [address]   Potential Assistance Needed Other (Comment); Durable Medical Equipment  (resume home care)   Potential Assistance Purchasing Medications No   DME Oxygen Therapy (Comment)   Type of [facility]   Patient expects to be discharged to: home with spouse   Expected Discharge Date 10/16/20

## 2020-10-13 NOTE — PLAN OF CARE
Problem: Falls - Risk of:  Goal: Will remain free from falls  Description: Will remain free from falls  10/13/2020 1758 by Stella Rodriguez RN  Outcome: Ongoing  10/13/2020 0741 by Sha Harper RN  Outcome: Ongoing  Goal: Absence of physical injury  Description: Absence of physical injury  10/13/2020 1758 by Stella Rodriguez RN  Outcome: Ongoing  10/13/2020 0741 by Sha Harper RN  Outcome: Ongoing

## 2020-10-13 NOTE — PLAN OF CARE
Problem: Falls - Risk of:  Goal: Will remain free from falls  Description: Will remain free from falls  10/13/2020 0741 by Fabricio Carter RN  Outcome: Ongoing  10/12/2020 1835 by Carla Orantes RN  Outcome: Ongoing  Goal: Absence of physical injury  Description: Absence of physical injury  10/13/2020 0741 by Fabricio Carter RN  Outcome: Ongoing  10/12/2020 1835 by Carla Orantes RN  Outcome: Ongoing

## 2020-10-13 NOTE — PROGRESS NOTES
Physical Therapy  Facility/Department: [facility]  Daily Treatment Note  NAME: Fifi Noel  : 1945  MRN: [id number]    Date of Service: 10/13/2020    Discharge Recommendations:  Patient would benefit from continued therapy after discharge        Assessment   Body structures, Functions, Activity limitations: Decreased functional mobility ; Decreased endurance;Decreased strength  Assessment: IMproved ambulation distance with RW, AFOs, and CGA. Patient needs further PT to regain functional independence. PT Education: Goals;PT Role;Plan of Care;General Safety;Gait Training; Injury Prevention;Transfer Training;Home Exercise Program;Functional Mobility Training  REQUIRES PT FOLLOW UP: Yes  Activity Tolerance  Activity Tolerance: Patient limited by fatigue     Patient Diagnosis(es): The encounter diagnosis was Pneumonia due to organism. has a past medical history of Acute superficial gastritis without hemorrhage, Anastomotic stricture of stomach, Asthma, Atrial fibrillation ([address] Calculus of gallbladder without cholecystitis without obstruction, Chronic diastolic heart failure (HCC), Chronic rhinosinusitis, Class 2 severe obesity due to excess calories with serious comorbidity and body mass index (BMI) of 36.0 to 36.9 in adult ([address] E. coli septicemia ([address] E. coli UTI, Foot drop, right, Fracture of femur ([address] Gait disorder, Gastric out let obstruction, GERD (gastroesophageal reflux disease), Hallux valgus, acquired, bilateral, Hyperlipidemia, Hypertension, Impaired hearing, Internal hemorrhoids, Lymphedema of both lower extremities, Nausea & vomiting, OA (osteoarthritis) of knee, bilateral, Obesity, MARIAMA on CPAP, Osteoarthritis, Osteoarthritis of lumbar spine, S/P revision of total knee, Septicemia due to E. coli ([address] Simple chronic bronchitis ([address] Slow transit constipation, Unspecified sleep apnea, and UTI (urinary tract infection).    has a past surgical history that includes Finger amputation (1966); Gastric bypass surgery (1985); Carpal tunnel release; Colonoscopy; Rotator cuff repair; joint replacement (2004 & 2005); Hemorrhoid surgery; pr egd transoral biopsy single/multiple (N/A, 5/25/2018); Upper gastrointestinal endoscopy (08/13/2018); Upper gastrointestinal endoscopy (N/A, 8/13/2018); Upper gastrointestinal endoscopy (N/A, 8/13/2018); Upper gastrointestinal endoscopy (08/16/2018); pr egd flexible foreign body removal (N/A, 8/16/2018); Upper gastrointestinal endoscopy (8/16/2018); Upper gastrointestinal endoscopy (N/A, 10/1/2018); Upper gastrointestinal endoscopy (10/1/2018); Upper gastrointestinal endoscopy (N/A, 11/19/2018); Cystoscopy (01/02/2019); and Cystoscopy (N/A, 1/2/2019). Restrictions  Restrictions/Precautions  Restrictions/Precautions: Fall Risk, Up as Tolerated  Required Braces or Orthoses?: Yes  Required Braces or Orthoses  Right Lower Extremity Brace: Ankle Foot Orthotics  Left Lower Extremity Brace: [address] [place] [address] [employer]  Subjective   General  Chart Reviewed: Yes  Subjective  Subjective: RN and pt agreeable to PT  Pain Screening  Patient Currently in Pain: Denies  Vital Signs  Patient Currently in Pain: Denies  Patient Observation  Observations: He declined the need for oxygen during gait, but it was replaced on him once back in bed.        Orientation x4     Cognition   Cognition  Overall Cognitive Status: WFL  Objective   Bed mobility  Supine to Sit: Stand by assistance  Sit to Supine: Stand by assistance  Transfers  Sit to Stand: Contact guard assistance  Stand to sit: Contact guard assistance  Ambulation  Ambulation?: Yes  Ambulation 1  Surface: level tile  Device: Rolling Walker  Other Apparatus: AFO  Assistance: Contact guard assistance  Distance: 110'     Balance  Standing - Static: Fair  Standing - Dynamic: Poor;+  Exercises  Comments: Standing hip abd swings x 10 reps                Goals  Short term goals  Time Frame for Short term goals: 10 visits  Short term goal 1: transfers with SBA  Short term goal 2: amb 150 ft with a RW x SBA  Short term goal 3: ascend/descend 4 steps with SBA  Short term goal 4: exercise program x SBA  Patient Goals   Patient goals : Return home    Plan    Plan  Times per week: 5-6x wk  Current Treatment Recommendations: Strengthening, Gait Training, Stair training, Functional Mobility Training, Endurance Training, Safety Education & Training, Home Exercise Program  Safety Devices  Type of devices: Bed alarm in place, Patient at risk for falls, Left in bed, Call light within reach, Nurse notified     Therapy Time   Individual Concurrent Group Co-treatment   Time In 1330         Time Out 1359         Minutes 29         Timed Code Treatment Minutes: [address], PT

## 2020-10-14 ENCOUNTER — APPOINTMENT (OUTPATIENT)
Dept: GENERAL RADIOLOGY | Age: 75
DRG: 178 | End: 2020-10-14
Payer: MEDICARE

## 2020-10-14 LAB
ABSOLUTE EOS #: 0.31 K/UL (ref 0–0.44)
ABSOLUTE IMMATURE GRANULOCYTE: <0.03 K/UL (ref 0–0.3)
ABSOLUTE LYMPH #: 0.73 K/UL (ref 1.1–3.7)
ABSOLUTE MONO #: 0.7 K/UL (ref 0.1–1.2)
ANION GAP SERPL CALCULATED.3IONS-SCNC: 13 MMOL/L (ref 9–17)
ANTI-NUCLEAR ANTIBODY (ANA): NEGATIVE
BASOPHILS # BLD: 1 % (ref 0–2)
BASOPHILS ABSOLUTE: 0.03 K/UL (ref 0–0.2)
BUN BLDV-MCNC: 12 MG/DL (ref 8–23)
BUN/CREAT BLD: ABNORMAL (ref 9–20)
CALCIUM SERPL-MCNC: 8.9 MG/DL (ref 8.6–10.4)
CHLORIDE BLD-SCNC: 99 MMOL/L (ref 98–107)
CO2: 25 MMOL/L (ref 20–31)
CREAT SERPL-MCNC: 0.7 MG/DL (ref 0.7–1.2)
CULTURE: NORMAL
DIFFERENTIAL TYPE: ABNORMAL
DIRECT EXAM: NORMAL
EOSINOPHILS RELATIVE PERCENT: 6 % (ref 1–4)
GFR AFRICAN AMERICAN: >60 ML/MIN
GFR NON-AFRICAN AMERICAN: >60 ML/MIN
GFR SERPL CREATININE-BSD FRML MDRD: ABNORMAL ML/MIN/{1.73_M2}
GFR SERPL CREATININE-BSD FRML MDRD: ABNORMAL ML/MIN/{1.73_M2}
GLUCOSE BLD-MCNC: 110 MG/DL (ref 70–99)
HCT VFR BLD CALC: 32 % (ref 40.7–50.3)
HEMOGLOBIN: 9.5 G/DL (ref 13–17)
IMMATURE GRANULOCYTES: 0 %
IRON SATURATION: 9 % (ref 20–55)
IRON: 23 UG/DL (ref 59–158)
LYMPHOCYTES # BLD: 15 % (ref 24–43)
Lab: NORMAL
MCH RBC QN AUTO: 30.1 PG (ref 25.2–33.5)
MCHC RBC AUTO-ENTMCNC: 29.7 G/DL (ref 28.4–34.8)
MCV RBC AUTO: 101.3 FL (ref 82.6–102.9)
MONOCYTES # BLD: 14 % (ref 3–12)
MYCOPLASMA PNEUMONIAE IGM: 0.07
NRBC AUTOMATED: 0 PER 100 WBC
PDW BLD-RTO: 13.8 % (ref 11.8–14.4)
PLATELET # BLD: 144 K/UL (ref 138–453)
PLATELET ESTIMATE: ABNORMAL
PMV BLD AUTO: 9.1 FL (ref 8.1–13.5)
POTASSIUM SERPL-SCNC: 4 MMOL/L (ref 3.7–5.3)
RBC # BLD: 3.16 M/UL (ref 4.21–5.77)
RBC # BLD: ABNORMAL 10*6/UL
SEG NEUTROPHILS: 64 % (ref 36–65)
SEGMENTED NEUTROPHILS ABSOLUTE COUNT: 3.17 K/UL (ref 1.5–8.1)
SODIUM BLD-SCNC: 137 MMOL/L (ref 135–144)
SPECIMEN DESCRIPTION: NORMAL
TOTAL IRON BINDING CAPACITY: 260 UG/DL (ref 250–450)
UNSATURATED IRON BINDING CAPACITY: 237 UG/DL (ref 112–347)
WBC # BLD: 5 K/UL (ref 3.5–11.3)
WBC # BLD: ABNORMAL 10*3/UL

## 2020-10-14 PROCEDURE — 97116 GAIT TRAINING THERAPY: CPT

## 2020-10-14 PROCEDURE — 6370000000 HC RX 637 (ALT 250 FOR IP): Performed by: NURSE PRACTITIONER

## 2020-10-14 PROCEDURE — APPSS30 APP SPLIT SHARED TIME 16-30 MINUTES: Performed by: PHYSICIAN ASSISTANT

## 2020-10-14 PROCEDURE — 83540 ASSAY OF IRON: CPT

## 2020-10-14 PROCEDURE — 6370000000 HC RX 637 (ALT 250 FOR IP): Performed by: INTERNAL MEDICINE

## 2020-10-14 PROCEDURE — 2580000003 HC RX 258: Performed by: NURSE PRACTITIONER

## 2020-10-14 PROCEDURE — 94760 N-INVAS EAR/PLS OXIMETRY 1: CPT

## 2020-10-14 PROCEDURE — 80048 BASIC METABOLIC PNL TOTAL CA: CPT

## 2020-10-14 PROCEDURE — 99221 1ST HOSP IP/OBS SF/LOW 40: CPT | Performed by: NURSE PRACTITIONER

## 2020-10-14 PROCEDURE — 94640 AIRWAY INHALATION TREATMENT: CPT

## 2020-10-14 PROCEDURE — 83550 IRON BINDING TEST: CPT

## 2020-10-14 PROCEDURE — 97530 THERAPEUTIC ACTIVITIES: CPT

## 2020-10-14 PROCEDURE — 92611 MOTION FLUOROSCOPY/SWALLOW: CPT

## 2020-10-14 PROCEDURE — 6360000002 HC RX W HCPCS: Performed by: STUDENT IN AN ORGANIZED HEALTH CARE EDUCATION/TRAINING PROGRAM

## 2020-10-14 PROCEDURE — 2700000000 HC OXYGEN THERAPY PER DAY

## 2020-10-14 PROCEDURE — 1200000000 HC SEMI PRIVATE

## 2020-10-14 PROCEDURE — 99232 SBSQ HOSP IP/OBS MODERATE 35: CPT | Performed by: INTERNAL MEDICINE

## 2020-10-14 PROCEDURE — 74230 X-RAY XM SWLNG FUNCJ C+: CPT

## 2020-10-14 PROCEDURE — 2580000003 HC RX 258: Performed by: STUDENT IN AN ORGANIZED HEALTH CARE EDUCATION/TRAINING PROGRAM

## 2020-10-14 PROCEDURE — 85025 COMPLETE CBC W/AUTO DIFF WBC: CPT

## 2020-10-14 PROCEDURE — 36415 COLL VENOUS BLD VENIPUNCTURE: CPT

## 2020-10-14 PROCEDURE — 6370000000 HC RX 637 (ALT 250 FOR IP): Performed by: STUDENT IN AN ORGANIZED HEALTH CARE EDUCATION/TRAINING PROGRAM

## 2020-10-14 RX ADMIN — ACETAMINOPHEN 650 MG: 325 TABLET ORAL at 08:40

## 2020-10-14 RX ADMIN — CETIRIZINE HYDROCHLORIDE 10 MG: 10 TABLET ORAL at 08:40

## 2020-10-14 RX ADMIN — Medication 10 ML: at 08:41

## 2020-10-14 RX ADMIN — SODIUM CHLORIDE 1.5 G: 900 INJECTION INTRAVENOUS at 14:12

## 2020-10-14 RX ADMIN — FLUTICASONE PROPIONATE 2 SPRAY: 50 SPRAY, METERED NASAL at 08:41

## 2020-10-14 RX ADMIN — ALBUTEROL 2 MG: 2 TABLET ORAL at 08:40

## 2020-10-14 RX ADMIN — THERA TABS 1 TABLET: TAB at 08:40

## 2020-10-14 RX ADMIN — SODIUM CHLORIDE 1.5 G: 900 INJECTION INTRAVENOUS at 20:43

## 2020-10-14 RX ADMIN — IPRATROPIUM BROMIDE AND ALBUTEROL SULFATE 1 AMPULE: .5; 3 SOLUTION RESPIRATORY (INHALATION) at 19:46

## 2020-10-14 RX ADMIN — GABAPENTIN 300 MG: 300 CAPSULE ORAL at 20:43

## 2020-10-14 RX ADMIN — TRAMADOL HYDROCHLORIDE 50 MG: 50 TABLET, COATED ORAL at 08:40

## 2020-10-14 RX ADMIN — BUDESONIDE AND FORMOTEROL FUMARATE DIHYDRATE 2 PUFF: 160; 4.5 AEROSOL RESPIRATORY (INHALATION) at 19:46

## 2020-10-14 RX ADMIN — IPRATROPIUM BROMIDE AND ALBUTEROL SULFATE 1 AMPULE: .5; 3 SOLUTION RESPIRATORY (INHALATION) at 08:12

## 2020-10-14 RX ADMIN — METOPROLOL SUCCINATE 25 MG: 25 TABLET, FILM COATED, EXTENDED RELEASE ORAL at 08:40

## 2020-10-14 RX ADMIN — PANTOPRAZOLE SODIUM 40 MG: 40 TABLET, DELAYED RELEASE ORAL at 08:40

## 2020-10-14 RX ADMIN — LEVOFLOXACIN 750 MG: 750 TABLET, FILM COATED ORAL at 08:40

## 2020-10-14 RX ADMIN — IPRATROPIUM BROMIDE AND ALBUTEROL SULFATE 1 AMPULE: .5; 3 SOLUTION RESPIRATORY (INHALATION) at 12:17

## 2020-10-14 RX ADMIN — Medication 10 ML: at 20:44

## 2020-10-14 RX ADMIN — GABAPENTIN 300 MG: 300 CAPSULE ORAL at 08:40

## 2020-10-14 RX ADMIN — ALBUTEROL 2 MG: 2 TABLET ORAL at 20:43

## 2020-10-14 RX ADMIN — ROPINIROLE HYDROCHLORIDE 0.25 MG: 0.25 TABLET, FILM COATED ORAL at 08:40

## 2020-10-14 RX ADMIN — ACETAMINOPHEN 650 MG: 325 TABLET ORAL at 15:28

## 2020-10-14 RX ADMIN — TRAMADOL HYDROCHLORIDE 50 MG: 50 TABLET, COATED ORAL at 23:20

## 2020-10-14 RX ADMIN — BUMETANIDE 1 MG: 1 TABLET ORAL at 08:40

## 2020-10-14 RX ADMIN — ROPINIROLE HYDROCHLORIDE 0.25 MG: 0.25 TABLET, FILM COATED ORAL at 20:43

## 2020-10-14 RX ADMIN — TRAMADOL HYDROCHLORIDE 50 MG: 50 TABLET, COATED ORAL at 15:28

## 2020-10-14 RX ADMIN — GABAPENTIN 300 MG: 300 CAPSULE ORAL at 14:12

## 2020-10-14 RX ADMIN — SODIUM CHLORIDE 1.5 G: 900 INJECTION INTRAVENOUS at 05:00

## 2020-10-14 RX ADMIN — METOPROLOL SUCCINATE 25 MG: 25 TABLET, FILM COATED, EXTENDED RELEASE ORAL at 20:43

## 2020-10-14 RX ADMIN — BUDESONIDE AND FORMOTEROL FUMARATE DIHYDRATE 2 PUFF: 160; 4.5 AEROSOL RESPIRATORY (INHALATION) at 08:12

## 2020-10-14 ASSESSMENT — PAIN DESCRIPTION - PAIN TYPE
TYPE: ACUTE PAIN
TYPE: ACUTE PAIN

## 2020-10-14 ASSESSMENT — PAIN SCALES - GENERAL
PAINLEVEL_OUTOF10: 7
PAINLEVEL_OUTOF10: 4
PAINLEVEL_OUTOF10: 5
PAINLEVEL_OUTOF10: 4
PAINLEVEL_OUTOF10: 6
PAINLEVEL_OUTOF10: 5
PAINLEVEL_OUTOF10: 8

## 2020-10-14 ASSESSMENT — PAIN DESCRIPTION - PROGRESSION

## 2020-10-14 ASSESSMENT — PAIN DESCRIPTION - FREQUENCY: FREQUENCY: CONTINUOUS

## 2020-10-14 ASSESSMENT — PAIN DESCRIPTION - LOCATION
LOCATION: CHEST
LOCATION: HEAD;NECK;CHEST

## 2020-10-14 ASSESSMENT — PAIN DESCRIPTION - ONSET: ONSET: ON-GOING

## 2020-10-14 NOTE — PROGRESS NOTES
PULMONARY & CRITICAL CARE MEDICINE CONSULT NOTE     Patient:  Darío Bowman  MRN: [id number]  Admit date: 10/11/2020  Primary Care Physician: Kym Martell PA-C  Consulting Physician: Kt Beebe DO  CODE Status: Full Code     HISTORY     CHIEF COMPLAINT/REASON FOR CONSULT: Hemoptysis / recurrent pneumonia. Interval History  Afebrile. Non-tachycardic   Hemodynamically stable. On 3 L NC    CRP 5.6->94.2  No leukocytosis. Legionella, strep pneumonia, and sputum culture negative. Denies fevers, chills, nausea, vomiting, diarrhea. States shortness of breath is improving. HISTORY OF PRESENT ILLNESS:  The patient is a 69 y.o. male with a significant past medical history of aspiration pneumonia, atrial fibrillation, CHF, MARIAMA on CPAP presented to the hospital with shortness of breath and fever. Patient was admitted for the management of pneumonia. Patient noted to have COPD what he felt as a COPD exacerbation. On Sunday patient noted to have an episode of coughing that led to emesis while his CPAP mask was on and he believed he aspirated some vomitus. This led to shortness of breath over the next couple of days. Episode of hemoptysis with streaked red blood in the sputum. Patient is a former smoker. No recent travel or surgeries. Though recent hospitalizations in June of this year. Has some mild pleuritic chest pain in the left side. Increased shortness of breath. Denies abdominal pain, diarrhea, focal neurological deficits, unintentional weight loss, dysphagia, odynophagia, hematemesis. Patient presented with a low-grade fever, nontachycardic, and hemodynamically stable. Room air saturating well. Initial BMP unremarkable. proBNP elevated at 4000. Troponin 50. Hepatic panel negative. BC demonstrated normocytic anemia. No leukocytosis. COVID-19 negative. Respiratory cultures negative thus far. Pneumonia antigen negative.   Chest x-ray on 10/11/2020 demonstrated multifocal infiltrate in the right lung as well as left lung base. CT chest on 10/11 showed multifocal pneumonia right greater than left. No evidence of pulmonary embolism. Cardiomegaly noted. There was subcarinal lymph node enlargement measuring 2.1 x 2.4 cm. Mildly enlarged right hilar lymph node. Of note patient has a history of prior gastric banding in 0864 with complications of gastric outlet obstruction requiring intermittent dilations. EGD in 10/2018 with dilation to 15 mm. Patient had another EGD 11/2018 with banding. Patient has also been treated for multiple pneumonias. In 6/2020 patient was treated for bradycardia and chf exacerbation at [facility]. Patient was also treated with vancomycin /zosyn x 7 days for possible aspiration pneumonia. Patient was then monitored off abx. Admitted again at [address] [place] [address] and treated for MDR enterobacter from the sputum that grew from the facility at [facility] with meropenem. Patient discharged on meropenem  Until 7/7/2020.        PAST MEDICAL HISTORY:        Diagnosis Date    Acute superficial gastritis without hemorrhage 5/26/2018    Anastomotic stricture of stomach     Asthma     Atrial fibrillation ([address]     On Xarelto 6/27/2020    Calculus of gallbladder without cholecystitis without obstruction 5/2/2017    Chronic diastolic heart failure ([address] 11/17/2017    Chronic rhinosinusitis 4/13/2015    Class 2 severe obesity due to excess calories with serious comorbidity and body mass index (BMI) of 36.0 to 36.9 in adult ([address] 11/17/2017    E. coli septicemia ([address]     E. coli UTI     Foot drop, right 7/10/2012    Fracture of femur ([address] 10/23/2016    Gait disorder 7/20/2016    Gastric out let obstruction     GERD (gastroesophageal reflux disease)     Hallux valgus, acquired, bilateral 6/24/2015    Hyperlipidemia     Hypertension     Impaired hearing 4/13/2015    Internal hemorrhoids 1/3/2017    Lymphedema of both lower extremities 6/24/2015  Nausea & vomiting 11/16/2018    OA (osteoarthritis) of knee, bilateral 12/11/2006    Obesity     MARIAMA on CPAP 5/2/2017    Osteoarthritis     Osteoarthritis of lumbar spine 12/13/2007     replace inactive diagnosis    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 coli (HCC)     Due to UTI     Simple chronic bronchitis ([facility] 4/10/2018    Slow transit constipation 11/3/2016    Unspecified sleep apnea     UTI (urinary tract infection)      PAST SURGICAL HISTORY:        Procedure Laterality Date    CARPAL TUNNEL RELEASE      bilateral    COLONOSCOPY      CYSTOSCOPY  01/02/2019    by Dr. Elena Meyers N/A 1/2/2019    CYSTOSCOPY performed by Nato Lozano MD at [address]    first knuckle right index finger   [address]    vertical banded gastroplasty   [address] REPLACEMENT  2004 & 2005    bilateral knees    OR EGD [address] N/A 8/16/2018    EGD FOREIGN BODY REMOVAL performed by Karime Borjas MD at [address] EGD TRANSORAL BIOPSY SINGLE/MULTIPLE N/A 5/25/2018    EGD BIOPSY performed by Frank Rivera DO at [address] [facility]      left shoulder    UPPER GASTROINTESTINAL ENDOSCOPY  08/13/2018    removal of food bolus    UPPER GASTROINTESTINAL ENDOSCOPY N/A 8/13/2018    EGD FOREIGN BODY REMOVAL performed by Frank Rivera DO at [address] [facility] 8/13/2018    EGD BIOPSY performed by Frank Rivera DO at [address]  08/16/2018    egd with balloon dilitation    UPPER GASTROINTESTINAL ENDOSCOPY  8/16/2018    EGD DILATION BALLOON performed by Karime Borjas MD at [address] [facility] 10/1/2018    EGD BIOPSY performed by Reji Seanz MD at [address] [place] [address]  10/1/2018    EGD DILATION BALLOON performed by Reji Saenz MD at [facility]    UPPER GASTROINTESTINAL ENDOSCOPY N/A 11/19/2018    EGD DILATION BALLOON performed by Reece Olmos MD at [facility]     FAMILY HISTORY:       Problem Relation Age of Onset    Cancer Mother     Heart Attack Father      SOCIAL HISTORY:   TOBACCO:   reports that he quit smoking about 43 years ago. He has a 20.00 pack-year smoking history. He has never used smokeless tobacco.  ETOH:  reports no history of alcohol use. DRUGS: reports no history of drug use. ALLERGIES:    Allergies   Allergen Reactions    Darvocet [Propoxyphene N-Acetaminophen] Hives    Other Hives    Oxycodone-Acetaminophen          HOME MEDICATIONS:  Prior to Admission medications    Medication Sig Start Date End Date Taking? Authorizing Provider   bumetanide (BUMEX) 1 MG tablet TAKE 2 TABLETS BY MOUTH ON MONDAY, WEDNESDAY AND FRIDAY.  TAKE 1 TABLET BY MOUTH ON ALL OTHER DAYS 9/17/20   Power Loera PA-C   rOPINIRole (REQUIP) 0.25 MG tablet TAKE 1 TABLET BY MOUTH TWICE DAILY 8/20/20   Power Loera PA-C   gabapentin (NEURONTIN) 300 MG capsule TAKE 1 CAPSULE BY MOUTH THREE TIMES DAILY 9/18/20 11/17/20  Power Loera PA-C   vitamin B-12 (CYANOCOBALAMIN) 100 MCG tablet TAKE 1 TABLET BY MOUTH DAILY AS NEEDED FOR FATIGUE 7/23/20   Power Loera PA-C   tiZANidine (ZANAFLEX) 4 MG tablet TAKE 1 TABLET BY MOUTH DAILY AS NEEDED 7/23/20   Power Loera PA-C   spironolactone (ALDACTONE) 25 MG tablet Take 1 tablet by mouth daily 7/16/20   Power Loera PA-C   metoprolol succinate (TOPROL XL) 25 MG extended release tablet Take 1 tablet by mouth 2 times daily 7/16/20   Power Loera PA-C   rivaroxaban (XARELTO) 20 MG TABS tablet Take 1 tablet by mouth daily 7/16/20   Power Loera PA-C   pantoprazole (PROTONIX) 40 MG tablet Take 1 tablet by mouth daily 7/16/20   Power Loera PA-C   potassium chloride (KLOR-CON M) 20 MEQ extended release tablet Take 1 tablet by mouth 2 times daily 7/16/20 Diana Shore PA-C   Multiple Vitamin (MULTI-VITAMINS) TABS Take 1 tablet by mouth daily 7/16/20   Diana Shore PA-C   cetirizine (ZYRTEC) 10 MG tablet TAKE 1 TABLET BY MOUTH DAILY 5/27/20   Diana Shore PA-C   fluticasone Jackquline Sees) 50 MCG/ACT nasal spray 2 sprays by Nasal route daily 3/5/20   Diana Shore PA-C   guaiFENesin (SM MUCUS RELIEF) 600 MG extended release tablet Take 1 tablet by mouth 2 times daily 3/5/20   Diana Shore PA-C   D-Mannose 500 MG CAPS Take 500 mg by mouth 3 times daily 11/11/19 12/11/19  Itzel Cowart MD   SM LUBRICANT EYE DROPS 0.4-0.3 % ophthalmic solution PLACE 1 DROP INTO BOTH EYES FOUR TIMES DAILY AS NEEDED FOR DRY EYES 10/17/19   Diana Shore PA-C   Fluticasone furoate-vilanterol (BREO ELLIPTA) 200-25 MCG/INH AEPB inhaler Inhale 1 puff into the lungs daily    Historical Provider, MD   Handicap Placard MISC by Does not apply route 6/27/19   Diana Shore PA-C   Incontinence Supply Disposable (INCONTINENCE BRIEF LARGE) MISC To use as directed. Use 3x a day 4/30/19   Diana Shore PA-C   polyethylene glycol [facility]) powder Take 17 g by mouth daily 3/5/19   Diana Shore PA-C   Foot Care Products (TRI-BALANCE ORTHOTICS MENS) [address] [facility] Provide insurance covered orthotics shoes, wear daily 12/12/18   Diaan Shore PA-C   albuterol (PROVENTIL) 2 MG tablet Use as needed 10/4/18   Savita Villanueva MD   ipratropium-albuterol (DUONEB) 0.5-2.5 (3) MG/3ML SOLN nebulizer solution Inhale 1 vial into the lungs every 4 hours as needed     Historical Provider, MD   Armodafinil 200 MG TABS Take 1 tablet by mouth 2 times daily.   4/2/18   Historical Provider, MD     IMMUNIZATIONS:  Most Recent Immunizations   Administered Date(s) Administered    Influenza Vaccine, unspecified formulation 01/02/2015    Influenza Virus Vaccine 01/02/2015    Influenza, Quadv, IM, PF (6 mo and older Fluzone, Flulaval, Fluarix, and 3 yrs and older Afluria) 09/28/2019    Pneumococcal Conjugate 13-valent ([id number]) 2018    Pneumococcal Polysaccharide ([id number]) 10/31/2019    Tdap (Boostrix, Adacel) 2019       REVIEW OF SYSTEMS:  Unobtainable from patient due to sedation/mechanical ventilation    PHYSICAL EXAMINATION     VITAL SIGNS:   LAST-  /69   Pulse 60   Temp 98.3 °F (36.8 °C) (Oral)   Resp 16   Ht 5' 10\" (1.778 m)   Wt 219 lb (99.3 kg)   SpO2 100%   BMI 31.42 kg/m²   8-24 HR RANGE-  TEMP Temp  Av.1 °F (36.7 °C)  Min: 98 °F (36.7 °C)  Max: 98.3 °F (26.0 °C)   BP Systolic (67JJK), PAR:884 , Min:101 , LSB:301      Diastolic (91OPJ), YMP:14, Min:55, Max:69     PULSE Pulse  Av.2  Min: 60  Max: 83   RR Resp  Av  Min: 16  Max: 16   O2 SAT No data recorded   OXYGEN DELIVERY No data recorded     SYSTEMIC EXAMINATION:   General appearance - cooperative. Mental status - A&O x 3   Eyes - pupils equal and reactive, sclera anicteric  Mouth - mucous membranes moist, pharynx normal without lesions  Neck - supple, no significant adenopathy, carotids upstroke normal bilaterally, no bruits  Chest - Course breath sounds on the right lung field. Diminished breath sounds.    Heart - normal rate, regular rhythm, normal S1, S2, no murmurs, rubs, clicks or gallops  Abdomen - soft, nontender, nondistended, no masses or organomegaly  Neurological - DTR's normal and symmetric, motor and sensory grossly normal bilaterally  Extremities - peripheral pulses normal, no pedal edema, no clubbing or cyanosis  Skin - normal coloration and turgor, no rashes, no suspicious skin lesions noted     DATA REVIEW     Medications: Current Inpatient  Scheduled Meds:   levoFLOXacin  750 mg Oral Daily    budesonide-formoterol  2 puff Inhalation BID    albuterol  2 mg Oral BID    bumetanide  1 mg Oral Daily    cetirizine  10 mg Oral Daily    fluticasone  2 spray Nasal Daily    gabapentin  300 mg Oral TID    multivitamin  1 tablet Oral Daily    metoprolol succinate  25 mg 01/04/2016    [id number] 3.9 (L) 01/04/2016     Lab Results   Component Value Date    CRP 94.2 (H) 10/13/2020     No results found for: [place] Bird  Lab Results   Component Value Date    FERRITIN 28 (L) 10/11/2020     No results found for: SPEP, UPEP  Lab Results   Component Value Date    PSA 0.30 10/30/2019     Microbiology:    Pathology:    Radiology Reports:  CT CHEST PULMONARY EMBOLISM W CONTRAST   Final Result   1. Findings most likely represent multifocal pneumonia, right greater than   left as detailed above. Follow-up is recommended to document resolution. 2. Upper abdominal free fluid, the significance of which is unclear. 3. No evidence for pulmonary embolus. 4. Four-chamber cardiac enlargement/cardiomegaly. Coronary artery disease. 5. Subcarinal lymph node enlargement measuring up to 2.1 x 2.4 cm. Mildly   enlarged right hilar lymph node. 6. Cholelithiasis. 7. Postsurgical changes in association with the stomach. 8. Hypodense thyroid nodule measuring 2.5 x 1.3 cm. RECOMMENDATIONS:   2.5 cm incidental thyroid nodule. Recommend thyroid US. Reference: J Am Minda Radiol. 2015 Feb;12(2): 143-50         XR CHEST PORTABLE   Final Result   Multifocal infiltrate in the right lung and also likely in the left lung   base. This may represent an inflammatory process or infection. FL MODIFIED BARIUM SWALLOW W VIDEO    (Results Pending)       Pulmonary Function test:    Polysomnogram:    Echocardiogram:   No results found for this or any previous visit. Cardiac Catheterization:   No results found for this or any previous visit.     ASSESSMENT AND PLAN     Assessment:    // Acute Hypoxic Respiratory distress likely secondary to aspiration  // Chemical Pneumonitis vs Aspiration Pneumonia.   // History of Gastric outlet obstruction as complication of bariatric banding.   // Subcarinal and hilar adenopathy  // COPD   // Tobacco abuse  // MARIAMA  //HTN  // HLD  //CHF of this automated transcription, some errors in transcription may have occurred.

## 2020-10-14 NOTE — PROCEDURES
INSTRUMENTAL SWALLOW REPORT  MODIFIED BARIUM SWALLOW    NAME: Enrrique Presley   : 1945  MRN: [id number]       Date of Eval: 10/14/2020        Radiologist: Dr. Kj Simpson     Referring Diagnosis(es):      Past Medical History:  has a past medical history of Acute superficial gastritis without hemorrhage, Anastomotic stricture of stomach, Asthma, Atrial fibrillation ([address] Calculus of gallbladder without cholecystitis without obstruction, Chronic diastolic heart failure ([address] Chronic rhinosinusitis, Class 2 severe obesity due to excess calories with serious comorbidity and body mass index (BMI) of 36.0 to 36.9 in adult ([address] E. coli septicemia ([address] E. coli UTI, Foot drop, right, Fracture of femur ([address] Gait disorder, Gastric out let obstruction, GERD (gastroesophageal reflux disease), Hallux valgus, acquired, bilateral, Hyperlipidemia, Hypertension, Impaired hearing, Internal hemorrhoids, Lymphedema of both lower extremities, Nausea & vomiting, OA (osteoarthritis) of knee, bilateral, Obesity, MARIAMA on CPAP, Osteoarthritis, Osteoarthritis of lumbar spine, S/P revision of total knee, Septicemia due to E. coli ([address] Simple chronic bronchitis ([address] Slow transit constipation, Unspecified sleep apnea, and UTI (urinary tract infection). Past Surgical History:  has a past surgical history that includes Finger amputation (); Gastric bypass surgery (); Carpal tunnel release; Colonoscopy; Rotator cuff repair; joint replacement ( & ); Hemorrhoid surgery; pr egd transoral biopsy single/multiple (N/A, 2018); Upper gastrointestinal endoscopy (2018); Upper gastrointestinal endoscopy (N/A, 2018); Upper gastrointestinal endoscopy (N/A, 2018); Upper gastrointestinal endoscopy (2018); pr egd flexible foreign body removal (N/A, 2018); Upper gastrointestinal endoscopy (2018); Upper gastrointestinal endoscopy (N/A, 10/1/2018); Upper gastrointestinal endoscopy (10/1/2018);  Upper gastrointestinal endoscopy (N/A, 11/19/2018); Cystoscopy (01/02/2019); and Cystoscopy (N/A, 1/2/2019). Current Diet Solid Consistency: Dysphagia Minced and Moist (Dysphagia II)  Current Diet Liquid Consistency: Thin       Type of Study: Initial MBS      Patient Complaints/Reason for Referral:  Omi Kaur was referred for a MBS to assess the efficiency of his/her swallow function, assess for aspiration, and to make recommendations regarding safe dietary consistencies, effective compensatory strategies, and safe eating environment. Behavior/Cognition/Vision/Hearing:  Behavior/Cognition: Alert; Cooperative  Vision: Impaired(wears glasses)  Hearing: Within functional limits    Impressions:  Pt. With no penetration, no aspiration with all consistencies tested. Min vallecula residual noted with puree. Premature spill noted throughout. However, oral phase is functional for consistencies tested. ST to recommend regular with thin liquid diet. Pt. Provided with education re: safe swallow strategies. Pt. Verbalized understanding. Results and recommendations reported to RN. Patient Degrees: upright in bed  Consistencies Administered: Dysphagia Soft and Bite-Sized (Dysphagia III); Reg solid; Dysphagia Pureed (Dysphagia I); Thin straw; Thin teaspoon;Nectar straw;Nectar  teaspoon    Recommended Diet:  Solid consistency: Regular  Liquid consistency: Thin    Safe Swallow Protocol:     Compensatory Swallowing Strategies: Upright as possible for all oral intake;Eat/Feed slowly; Small bites/sips      Recommendations/Treatment  Requires SLP Intervention: Yes  D/C Recommendations: Further therapy recommended at discharge. Education: Images and recommendations were reviewed with pt.  And RN following this exam.   Patient Education: yes  Patient Education Response: Verbalizes understanding    Prognosis  Prognosis for safe diet advancement: fair  Duration/Frequency of Treatment  Duration/Frequency of Treatment: 1-2 x week      Goals:    Long Term: To Maximize safety with intake, optimize nutrition/hydration and minimize risk for aspiration. Short Term:    Dysphagia Goals: The patient will tolerate recommended diet without observed clinical signs of aspiration      Oral Preparation / Oral Phase  Oral Phase: WFL   Oral Phase: Premature spill noted throughout. However, oral phase is functional for consistencies tested. Pharyngeal Phase  Pharyngeal Phase: WFL   Pharyngeal: No penetration, no aspiration with all consistencies tested. Min vallecula residual noted with puree. Esophageal Phase  Esophageal Screen: WFL    Pain   Patient Currently in Pain: No  Pain Level: 5  Pain Type: Acute pain  Pain Location: Chest      Therapy Time:   Individual Concurrent Group Co-treatment   Time In 1100         [facility], 10/14/2020, 11:52 AM

## 2020-10-14 NOTE — PROGRESS NOTES
[facility]  Office: [address], DO, Philipp Aliciase, DO, Jameylucio Pittman, DO, Collins Burch, DO, Gina Meza MD, Clifford Barragan MD, Gab Vargas MD, Des Lora MD, Claire Garcia MD, Inocencia Truong MD, Rudy Osorio MD, Esperanza Porras MD, Marisela Rojas MD, Alix Burciaga DO, Michael Gee MD, Neeraj Simmons MD, Kathy Larson DO, Edy [place] MD,  Rick Pack DO, Calvin Ibarra MD, Benito Hernandes MD, Jones eLung, CNP, [facility], CNP, Rafa Serrano, CNP, Cleopatra Rouse, CNS, Ruba Khan, CNP, Cindy Urbina, CNP, Mari Perez, CNP, Ludwig Juarez, CNP, Hu Bustos, CNP, Liliam Gleason PA-C, Carmelina Dumont, [facility], Caleb Martinez, CNP, Margarito Shen, CNP, Aranza Armas, CNP, Eliana Menjivar, CNP, Ayaan Perales, CNP         [facility]   [address] [facility]    Progress Note    10/14/2020    10:34 AM    Name:   Uday Nichole  MRN:     [id number]     Acct:      [de-identified]   Room:   [address] [place] [address] Day:  3  Admit Date:  10/11/2020  9:03 AM    PCP:   Adin Bernardo PA-C  Code Status:  Full Code    Subjective:     C/C:   Chief Complaint   Patient presents with    Shortness of Breath    Fever     Interval History Status: unchanged. Pt seen and evaluated this a.m. He has no new complaints. States that he did cough up a \"clump\" of blood yesterday. Continues to require supplemental oxygen. He has had long standing issues with aspiration and dysphagia stemming from previous esophageal stenosis, requiring ballooning. He is otherwise afebrile and hemodynamically stable. Labs reviewed. Case discussed with GI. Brief History:     Arline Carrasco is a 69-year-old male who presented to the hospital complaining of shortness of breath and generalized weakness. Patient reported that he had been feeling unwell for approximately 1 week.   He states that he had a uncontrollable coughing episode while his CPAP was on which subsequently led to vomiting and aspiration. Following that episode, he states that he developed a fever. In the emergency department he underwent CT scan of the chest which suggested multifocal multiple pneumonia, right greater than left. He was started on Zosyn for presumed aspiration pneumonia. Cardiology was consulted regarding elevated troponin which appears to be chronic. Review of Systems:     Constitutional:  negative for chills, fevers, sweats  Respiratory:  + for cough, dyspnea on exertion, shortness of breath. Negative for wheezing  Cardiovascular:  negative for chest pain, chest pressure/discomfort, lower extremity edema, palpitations   Gastrointestinal:  negative for abdominal pain, constipation, diarrhea, nausea, vomiting  Neurological:  negative for dizziness, headache    Medications: Allergies:     Allergies   Allergen Reactions    Darvocet [Propoxyphene N-Acetaminophen] Hives    Other Hives    Oxycodone-Acetaminophen        Current Meds:   Scheduled Meds:    levoFLOXacin  750 mg Oral Daily    budesonide-formoterol  2 puff Inhalation BID    albuterol  2 mg Oral BID    bumetanide  1 mg Oral Daily    cetirizine  10 mg Oral Daily    fluticasone  2 spray Nasal Daily    gabapentin  300 mg Oral TID    multivitamin  1 tablet Oral Daily    metoprolol succinate  25 mg Oral BID    pantoprazole  40 mg Oral Daily    [Held by provider] potassium chloride  20 mEq Oral BID    [Held by provider] rivaroxaban  20 mg Oral Daily    rOPINIRole  0.25 mg Oral BID    [Held by provider] spironolactone  25 mg Oral Daily    sodium chloride flush  10 mL Intravenous 2 times per day    ipratropium-albuterol  1 ampule Inhalation Q4H WA    ampicillin-sulbactam  1.5 g Intravenous Q6H     Continuous Infusions:   PRN Meds: traMADol, acetaminophen, ipratropium-albuterol, sodium chloride flush, magnesium hydroxide, promethazine **OR** ondansetron, polyethylene glycol    Data:     Past Medical History:   has a past medical history of Acute superficial gastritis without hemorrhage, Anastomotic stricture of stomach, Asthma, Atrial fibrillation ([address] Calculus of gallbladder without cholecystitis without obstruction, Chronic diastolic heart failure (HCC), Chronic rhinosinusitis, Class 2 severe obesity due to excess calories with serious comorbidity and body mass index (BMI) of 36.0 to 36.9 in adult ([facility] E. coli septicemia ([facility] E. coli UTI, Foot drop, right, Fracture of femur ([address] Gait disorder, Gastric out let obstruction, GERD (gastroesophageal reflux disease), Hallux valgus, acquired, bilateral, Hyperlipidemia, Hypertension, Impaired hearing, Internal hemorrhoids, Lymphedema of both lower extremities, Nausea & vomiting, OA (osteoarthritis) of knee, bilateral, Obesity, MARIAMA on CPAP, Osteoarthritis, Osteoarthritis of lumbar spine, S/P revision of total knee, Septicemia due to E. coli ([facility] Simple chronic bronchitis ([facility] Slow transit constipation, Unspecified sleep apnea, and UTI (urinary tract infection). Social History:   reports that he quit smoking about 43 years ago. He has a 20.00 pack-year smoking history. He has never used smokeless tobacco. He reports that he does not drink alcohol or use drugs. Family History:   Family History   Problem Relation Age of Onset   Sharon Curl Cancer Mother     Heart Attack Father        Vitals:  /66   Pulse 71   Temp 98.3 °F (36.8 °C) (Oral)   Resp 20   Ht 5' 10\" (1.778 m)   Wt 219 lb (99.3 kg)   SpO2 100%   BMI 31.42 kg/m²   Temp (24hrs), Av.1 °F (36.7 °C), Min:98 °F (36.7 °C), Max:98.3 °F (36.8 °C)    No results for input(s): POCGLU in the last 72 hours. I/O (24Hr):     Intake/Output Summary (Last 24 hours) at 10/14/2020 1034  Last data filed at 10/14/2020 0838  Gross per 24 hour   Intake 780 ml   Output 1500 ml   Net -720 ml       Labs:  Hematology:  Recent Labs     10/13/20  0927 10/13/20  1645 10/14/20  0917   WBC 5.7  --  5.0   RBC 3.07*  --  3.16*   HGB 9.6* --  9.5*   HCT 31.9*  --  32.0*   .9*  --  101.3   MCH 31.3  --  30.1   MCHC 30.1  --  29.7   RDW 14.5*  --  13.8     --  144   MPV 10.0  --  9.1   CRP  --  94.2*  --      Chemistry:  Recent Labs     10/11/20  1207 10/13/20  0927 10/14/20  0917   NA  --  135 137   K  --  3.8 4.0   CL  --  99 99   CO2  --  28 25   GLUCOSE  --  113* 110*   BUN  --  12 12   CREATININE  --  0.66* 0.70   ANIONGAP  --  8* 13   LABGLOM  --  >60 >60   GFRAA  --  >60 >60   CALCIUM  --  8.6 8.9   TROPHS 50*  --   --      No results for input(s): PROT, LABALBU, LABA1C, F4ZIJXJ, B4JDSXY, FT4, TSH, AST, ALT, LDH, GGT, ALKPHOS, LABGGT, BILITOT, BILIDIR, AMMONIA, AMYLASE, LIPASE, LACTATE, CHOL, HDL, LDLCHOLESTEROL, CHOLHDLRATIO, TRIG, VLDL, GLQ90SW, PHENYTOIN, PHENYF, URICACID, POCGLU in the last 72 hours. ABG:No results found for: POCPH, PHART, PH, POCPCO2, QMI5ECL, PCO2, POCPO2, PO2ART, PO2, POCHCO3, TNQ8ROA, HCO3, NBEA, PBEA, BEART, BE, THGBART, THB, WJM3MVN, DVQI9VLW, X4OLBXLL, O2SAT, FIO2  Lab Results   Component Value Date/Time    SPECIAL NOT REPORTED 10/13/2020 12:05 PM     Lab Results   Component Value Date/Time    CULTURE PENDING 10/13/2020 12:05 PM       Radiology:  Xr Chest Portable    Result Date: 10/11/2020  Multifocal infiltrate in the right lung and also likely in the left lung base. This may represent an inflammatory process or infection. Ct Chest Pulmonary Embolism W Contrast    Result Date: 10/11/2020  1. Findings most likely represent multifocal pneumonia, right greater than left as detailed above. Follow-up is recommended to document resolution. 2. Upper abdominal free fluid, the significance of which is unclear. 3. No evidence for pulmonary embolus. 4. Four-chamber cardiac enlargement/cardiomegaly. Coronary artery disease. 5. Subcarinal lymph node enlargement measuring up to 2.1 x 2.4 cm. Mildly enlarged right hilar lymph node. 6. Cholelithiasis. 7. Postsurgical changes in association with the stomach. 8. Hypodense thyroid nodule measuring 2.5 x 1.3 cm. RECOMMENDATIONS: 2.5 cm incidental thyroid nodule. Recommend thyroid US. Reference: J Am Minda Radiol. 2015 Feb;12(2): 143-50       Physical Examination:        General appearance:  alert, cooperative and no distress  Mental Status:  oriented to person, place and time and normal affect  Lungs: basilar crackles, faint. Much improved when compared to yesterday  Heart:  regular rate and rhythm, no murmur  Abdomen:  soft, nontender, nondistended, normal bowel sounds, no masses, hepatomegaly, splenomegaly  Extremities:  no edema, redness, tenderness in the calves  Skin:  no gross lesions, rashes, induration    Assessment:        Hospital Problems           Last Modified POA    * (Principal) Pneumonia due to organism 10/11/2020 Yes    Atrial fibrillation ([address] 10/11/2020 Yes    GERD (gastroesophageal reflux disease) 10/11/2020 Yes    Hypertension 10/11/2020 Yes    MARIAMA on CPAP 10/11/2020 Yes    COPD (chronic obstructive pulmonary disease) ([facility] 10/11/2020 Yes    Chronic diastolic heart failure ([address] 10/11/2020 Yes    H/O gastric bypass 10/11/2020 Yes          Plan:        #Aspiration pneumonia/hemoptysis/Subcarinal lymph node enlargement measuring up to 2.1 x 2.4 cm. Mildly enlarged right hilar lymph node. - treat aspiration pneumonia with unasyn, levaquin  - f/u sputum culture  - will need repeat imaging and follow-up with his pulmonologist, Dr. Bertrand Multani to further evaluate lymphadenopathy and hemoptysis  - obtain mycoplasma, strep pneumo antigen  - obtain MBSS  - GI consult for dyshagia    #Atrial fibrillation/HFpEF/Troponin elevation  - consultation placed to cardiology, Dr. Jenny Philippe  - bumex, toprol. Will hold xarelto given hemoptysis  - aldactone currently on hold    #COPD  - continue symbicort  - duoneb q4h  - wean O2 as tolerated    #Hx gastric bypass    #Anemia  - macrocytic anemia, will check B12/folate.  He could be deficient given hx gastric bypass  - check iron studies  - will continue to monitor hemoglobin, will hold xarelto for now given hemoptysis    #Hx of aspiration  - he has had evidence of aspiration on swallow study during last hospital admission. Recommendation was for dysphagia 6, thin liquids.  Pt is aware and accepts risk of aspiration    #Thyroid mass  - recommend ultrasound as an outpatient to further assess thyroid mass    #Monitor electrolytes, replete as necessary  #PT/OT assessment    Glo Herrera PA-C  10/14/2020  10:34 AM

## 2020-10-14 NOTE — CONSULTS
[facility] [place] [facility]  Consultation Note     . REASON FOR CONSULTATION:    History of bariatric banding with complication of gastric outlet obstruction requiring multiple EGD's with dilatations    HISTORY OF PRESENT ILLNESS:     This is a 69 y.o. male with PMH including COPD, asthma, GERD, gastric bypass diastolic CHF, atrial fibrillation-on Xarelto-last dose 10/12/2020, hypertension and MARIAMA. Pt presented to the hospital with complaint of increased dyspnea after he had a coughing spell that prompted vomiting while where his C-pap. He is currently being treated for aspiration pneumonia with Zosyn. GI was consulted because pt has hx of gastric outlet obstruction secondary to gastric banding that required esophageal dilatation last 11/2018. Pt states that he has not been having issues since the EGD-no dysphagia, epigastric pain or globus sensation. He tolerates regular diet at home. He is scheduled for Video swallow eval today. He is tolerating his breakfast of Arabic toast and oatmeal with no issues. Pt is anemic with Hgb 9.5 which appears to be his baseline. No bleeding or melena reported per pt. Iron studies show HIEU with sat 6%    CT chest showed multifocal pneumonia, upper abdominal free fluid, cardiomegaly, lymphadenopathy, cholelithiasis and thyroid nodule    Previous GI history:   11/2018 EGD per Dr. Gudino Few:  FINDINGS:   Esophagus: The esophagus was inspected to the Z-line. The endoscopic exam showed no patology.      Stomach: The stomach was inspected in both forward and retroflex fashion and was appropriately distensible. The cardia, fundus, incisura, antrum and pylorus were identified via direct visualization. The endoscopic exam showed evidence of prior surgical banding with mild narrowing. The scope was advanced through easily.  Retained material was noted in proximal stomach (pills vs gum).     Duodenum: The proximal small bowel was inspected through the bulb, sweep, and second portion of the duodenum. The endoscopic exam showed no pathology.     The site of gastric banding was balloon dilated sequentially from 15 to 20 mm. Mild bleeding was noted. Some of the retained gastric material was removed using basket. This material appeared soft (possible pill capsules).       RECOMMENDATIONS:   1) Transfer back to the floor for further management as per primary care team.   2) Full liquid diet today. Possibly advance to low fiber tomorrow. Avoid high residue det.  Would need to crush pills to prevent obstruction.      Randee Garcia MD [facility]    Past Medical/Social/Family History:  Past Medical History:   Diagnosis Date    Acute superficial gastritis without hemorrhage 5/26/2018    Anastomotic stricture of stomach     Asthma     Atrial fibrillation ([address]     On Xarelto 6/27/2020    Calculus of gallbladder without cholecystitis without obstruction 5/2/2017    Chronic diastolic heart failure ([address] 11/17/2017    Chronic rhinosinusitis 4/13/2015    Class 2 severe obesity due to excess calories with serious comorbidity and body mass index (BMI) of 36.0 to 36.9 in adult ([address] 11/17/2017    E. coli septicemia ([address]     E. 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 coli ([address]     Due to UTI     Simple chronic bronchitis ([address] 4/10/2018    Slow transit constipation 11/3/2016    Unspecified sleep apnea     UTI (urinary tract infection)      Past Surgical History:   Procedure Laterality Date    CARPAL TUNNEL RELEASE      bilateral    COLONOSCOPY      CYSTOSCOPY  01/02/2019    by Dr. Boubacar Leblanc N/A 1/2/2019    CYSTOSCOPY performed by Huber Rinaldi MD at [address]    first knuckle right index finger   [address] [facility]    vertical banded gastroplasty   [address] REPLACEMENT  2004 & 2005    bilateral knees    PA EGD [address] [facility] [address] N/A 8/16/2018    EGD FOREIGN BODY REMOVAL performed by Wilmer Yoder MD at [address] EGD TRANSORAL BIOPSY SINGLE/MULTIPLE N/A 5/25/2018    EGD BIOPSY performed by Danial Bocanegra DO at [address] [facility]      left shoulder    UPPER GASTROINTESTINAL ENDOSCOPY  08/13/2018    removal of food bolus    UPPER GASTROINTESTINAL ENDOSCOPY N/A 8/13/2018    EGD FOREIGN BODY REMOVAL performed by Danial Bocanegra DO at [facility] 8/13/2018    EGD BIOPSY performed by Danial Bocanegra DO at [address]  08/16/2018    egd with balloon dilitation    UPPER GASTROINTESTINAL ENDOSCOPY  8/16/2018    EGD DILATION BALLOON performed by Wilmer Yoder MD at [facility] 10/1/2018    EGD BIOPSY performed by Ada Hirsch MD at [address] [place] [address]  10/1/2018    EGD DILATION BALLOON performed by Ada Hirsch MD at [address] [place] [address] N/A 11/19/2018    EGD DILATION BALLOON performed by Ada Hirsch MD at [facility]     Family History   Problem Relation Age of Onset    Cancer Mother     Heart Attack Father        Allergies:   Allergies   Allergen Reactions    Darvocet [Propoxyphene N-Acetaminophen] Hives    Other Hives    Oxycodone-Acetaminophen        Home medications:  Prior DAILY AS NEEDED FOR DRY EYES 10/17/19   Vera Romberg, PA-C   Fluticasone furoate-vilanterol (BREO ELLIPTA) 200-25 MCG/INH AEPB inhaler Inhale 1 puff into the lungs daily    Historical Provider, MD   Handicap Placard MISC by Does not apply route 6/27/19   Vera Romberg, PA-C   Incontinence Supply Disposable (INCONTINENCE BRIEF LARGE) MISC To use as directed. Use 3x a day 4/30/19   Vera Romberg, PA-C   polyethylene glycol [facility]) powder Take 17 g by mouth daily 3/5/19   Vera Romberg, PA-C   Foot Care Products (TRI-BALANCE ORTHOTICS MENS) [address] [facility] Provide insurance covered orthotics shoes, wear daily 12/12/18   Vera Romberg, PA-C   albuterol (PROVENTIL) 2 MG tablet Use as needed 10/4/18   Deni Red MD   ipratropium-albuterol (DUONEB) 0.5-2.5 (3) MG/3ML SOLN nebulizer solution Inhale 1 vial into the lungs every 4 hours as needed     Historical Provider, MD   Armodafinil 200 MG TABS Take 1 tablet by mouth 2 times daily. 4/2/18   Historical Provider, MD     .  Current Medications:  Scheduled Meds:   levoFLOXacin  750 mg Oral Daily    budesonide-formoterol  2 puff Inhalation BID    albuterol  2 mg Oral BID    bumetanide  1 mg Oral Daily    cetirizine  10 mg Oral Daily    fluticasone  2 spray Nasal Daily    gabapentin  300 mg Oral TID    multivitamin  1 tablet Oral Daily    metoprolol succinate  25 mg Oral BID    pantoprazole  40 mg Oral Daily    [Held by provider] potassium chloride  20 mEq Oral BID    [Held by provider] rivaroxaban  20 mg Oral Daily    rOPINIRole  0.25 mg Oral BID    [Held by provider] spironolactone  25 mg Oral Daily    sodium chloride flush  10 mL Intravenous 2 times per day    ipratropium-albuterol  1 ampule Inhalation Q4H WA    ampicillin-sulbactam  1.5 g Intravenous Q6H     . Continuous Infusions:  .   PRN Meds:traMADol, acetaminophen, ipratropium-albuterol, sodium chloride flush, magnesium hydroxide, promethazine **OR** ondansetron, polyethylene glycol    REVIEW OF SYSTEMS:     Constitutional: No fever, no chills, no lethargy, no weakness, no weight loss  HEENT:  No headache, otalgia, itchy eyes, nasal discharge or sore throat. Cardiac:  Increased dyspnea  Chest:   No cough, phlegm or wheezing. Abdomen:  No abdominal pain, nausea, vomiting, constipation, diarrhea, hematochezia/melena  Neuro:  No focal weakness, abnormal movements or seizure like activity. Skin:   No rashes, no itching. :   No hematuria, no pyuria, no dysuria, no flank pain. Extremities:  No swelling or joint pains. ROS was otherwise negative except as mentioned in the [address] PHYSICAL EXAM:    /66   Pulse 71   Temp 98.3 °F (36.8 °C) (Oral)   Resp 20   Ht 5' 10\" (1.778 m)   Wt 219 lb (99.3 kg)   SpO2 100%   BMI 31.42 kg/m²   . TMAX[24]    General: elderly   Head:  Normocephalic, Atraumatic  EENT: EOMI, Sclera not icteric, Oropharynx moist  Neck:  Supple, Trachea midline  Lungs:CTA Bilaterally  Heart: RRR, No murmur, No rub, No gallop, PMI nondisplaced. Abdomen:Soft, Non tender, Not distended, BS WNL,  No masses. No hepatomegalia   Ext:No clubbing. No cyanosis. No edema. Skin: No rashes. No jaundice. No stigmata of liver disease. Neuro:  A&O x Three, No focal neurological deficits    Imaging:   10/11/2020 CT Chest:  FINDINGS:    Pulmonary Arteries: No obvious filling defect identified within the pulmonary    arterial vasculature that meets the criteria for pulmonary embolus.         Mediastinum: Cardiomegaly.  Coronary artery disease.  Four-chamber cardiac    enlargement.  Trace pericardial fluid.  No sizable pleural effusions.  2.5 x    1.3 cm hypodense lesion in the right thyroid lobe on image 14, series 3. Atherosclerotic calcification of the aorta.  Subcarinal lymph node    enlargement measuring 2.1 x 2.4 cm on image 112, series 3.  Mildly prominent    right hilar lymph node enlargement.  No axillary lymphadenopathy.  No left    hilar lymphadenopathy. CALCIUM 9.1 8.6       Liver work up:  Hepatitis Functional Panel:  Recent Labs     10/11/20  0938   ALKPHOS 126   ALT 9   AST 19   PROT 7.3   BILITOT 1.12   LABALBU 4.0       Amylase/Lipase/Ammonia:  Recent Labs     10/11/20  0938   LIPASE 11*       Acute Hepatitis Panel:  Lab Results   Component Value Date    HEPCAB NONREACTIVE 04/10/2017       Cancer Markers:  CEA:    No results for input(s): CEA in the last 72 hours. Ca 125:   No results for input(s):  in the last 72 hours. Ca 19-9:     Invalid input(s):   AFP: No results for input(s): AFP in the last 72 hours. Principal Problem:    Pneumonia due to organism  Active Problems:    Atrial fibrillation (HCC)    GERD (gastroesophageal reflux disease)    Hypertension    MARIAMA on CPAP    COPD (chronic obstructive pulmonary disease) (HCC)    Chronic diastolic heart failure (HCC)    H/O gastric bypass  Resolved Problems:    * No resolved hospital problems. *       GI Assessment:  69 yr old male admitted with suspected aspiration pneumonia after coughing, vomiting while wearing C-pap. Concern is for Esophageal narrowing/gastric outlet obstructruction due to stricture/adhesions from previous bariatric surgery. 1. Suspected gastric outlet obstruction due to stricture/adhesions from previous bariatric surgery. 2. Iron deficient anemia-no overt bleeding  3. HX Afib-on Xarelto-last dose 10/12/2020  4. Suspected aspiration Pneumonia-mgt per primary    Plan and Recommendations:    1. Will plan for EGD with possible dilatation Thursday am (pt ate today). 2. Will follow up on video swallow study  3. Rec IV iron supplements  4. NPO MN    This plan was formulated in collaboration with Dr. Chip Cramer MD    Electronically signed by:  Yarely Urban [address] [facility]  [phone]  10/14/2020    9:37 AM       Late entry  Attending Renu Judd  Patient seen and examined with Ms. Kalee Amin, PAVEL.  I have reviewed the above note and confirmed the key elements of the medical history and physical exam. I have discussed the findings, established the care plan and recommendations with Ms. Chris Sprague and primary team.  71-year-old male with history of vertical banded gastroplasty in the 1980s who presents with cough and difficulty swallowing solid food with episodes of regurgitation admitted with suspicion suspected aspiration pneumonia. Patient has had EGD with dilations in the past however due to the history of VBG dilations are not feasible due to the presence of a Silastic non-dilatable band. We will perform an EGD to assess the gastric pouch and possible creation of a GG fistula if necessary in the near future.     Marlon Leung MD  Gastroenterology

## 2020-10-14 NOTE — PROGRESS NOTES
Physical Therapy  Facility/Department: [facility]  Daily Treatment Note  NAME: Isabela Gunn  : 1945  MRN: [id number]    Date of Service: 10/14/2020    Discharge Recommendations:  Patient would benefit from continued therapy after discharge        Assessment   Body structures, Functions, Activity limitations: Decreased functional mobility ; Decreased endurance;Decreased strength; Increased pain  Assessment: Ambulation distance approximately the same as yesterday, very effortful. Coughed up some bloody sputum while in standing. Will continue to treat for greater mobility. PT Education: Goals;PT Role;Plan of Care;General Safety;Gait Training; Injury Prevention;Transfer Training;Functional Mobility Training  REQUIRES PT FOLLOW UP: Yes  Activity Tolerance  Activity Tolerance: Patient limited by fatigue;Patient limited by endurance     Patient Diagnosis(es): The encounter diagnosis was Pneumonia due to organism.      has a past medical history of Acute superficial gastritis without hemorrhage, Anastomotic stricture of stomach, Asthma, Atrial fibrillation ([address] Calculus of gallbladder without cholecystitis without obstruction, Chronic diastolic heart failure (HCC), Chronic rhinosinusitis, Class 2 severe obesity due to excess calories with serious comorbidity and body mass index (BMI) of 36.0 to 36.9 in adult ([address] E. coli septicemia ([address] E. coli UTI, Foot drop, right, Fracture of femur ([address] Gait disorder, Gastric out let obstruction, GERD (gastroesophageal reflux disease), Hallux valgus, acquired, bilateral, Hyperlipidemia, Hypertension, Impaired hearing, Internal hemorrhoids, Lymphedema of both lower extremities, Nausea & vomiting, OA (osteoarthritis) of knee, bilateral, Obesity, MARIAMA on CPAP, Osteoarthritis, Osteoarthritis of lumbar spine, S/P revision of total knee, Septicemia due to E. coli ([address] Simple chronic bronchitis ([address] Slow transit constipation, Unspecified sleep apnea, and UTI (urinary tract infection). has a past surgical history that includes Finger amputation (1966); Gastric bypass surgery (1985); Carpal tunnel release; Colonoscopy; Rotator cuff repair; joint replacement (2004 & 2005); Hemorrhoid surgery; pr egd transoral biopsy single/multiple (N/A, 5/25/2018); Upper gastrointestinal endoscopy (08/13/2018); Upper gastrointestinal endoscopy (N/A, 8/13/2018); Upper gastrointestinal endoscopy (N/A, 8/13/2018); Upper gastrointestinal endoscopy (08/16/2018); pr egd flexible foreign body removal (N/A, 8/16/2018); Upper gastrointestinal endoscopy (8/16/2018); Upper gastrointestinal endoscopy (N/A, 10/1/2018); Upper gastrointestinal endoscopy (10/1/2018); Upper gastrointestinal endoscopy (N/A, 11/19/2018); Cystoscopy (01/02/2019); and Cystoscopy (N/A, 1/2/2019). Restrictions  Restrictions/Precautions  Restrictions/Precautions: Fall Risk, Up as Tolerated  Required Braces or Orthoses?: Yes  Required Braces or Orthoses  Right Lower Extremity Brace: Ankle Foot Orthotics  Left Lower Extremity Brace: Erika Foot Orthotics  Subjective   General  Chart Reviewed: Yes  Response To Previous Treatment: Patient with no complaints from previous session. Family / Caregiver Present: No  Subjective  Subjective: RN and pt agreeable to PT  Pain Screening  Patient Currently in Pain: Yes  Pain Assessment  Pain Assessment: 0-10  Pain Level: 5  Pain Type: Acute pain  Pain Location: Chest  Vital Signs  Patient Currently in Pain: Yes  Patient Observation  Observations: He declined the need for oxygen during gait, though he slept with it on.             Cognition   Cognition  Overall Cognitive Status: WFL  Objective   Bed mobility  Supine to Sit: Stand by assistance  Sit to Supine: Stand by assistance  Transfers  Sit to Stand: Contact guard assistance(Multiple attempts needed before he was successful with sit to stand without additional assistance given.)  Stand to sit: Contact guard assistance  Ambulation  Ambulation?: Yes  Ambulation 1  Surface: level tile  Device: Rolling Walker  Other Apparatus: AFO  Assistance: Contact guard assistance  Quality of Gait: Slow, effortful gait today  Gait Deviations: Slow Zo[place]  Distance: 120'     Balance  Standing - Static: Fair;-  Standing - Dynamic: Poor;+  Comments: Needed assist with his mask so that he didn't need to let go of UE support on walker. Exercises deferred due to transporter arriving for trip to chest Xray.   Goals  Short term goals  Time Frame for Short term goals: 10 visits  Short term goal 1: transfers with SBA  Short term goal 2: amb 150 ft with a RW x SBA  Short term goal 3: ascend/descend 4 steps with SBA  Short term goal 4: exercise program x SBA  Patient Goals   Patient goals : Return home    Plan    Plan  Times per week: 5-6x wk  Current Treatment Recommendations: Strengthening, Gait Training, Stair training, Functional Mobility Training, Endurance Training, Safety Education & Training, Home Exercise Program  Safety Devices  Type of devices: Patient at risk for falls, Nurse notified, Gait belt(Left in care of transporter who came to his room for trip to chest Xray)     Therapy Time   Individual Concurrent Group Co-treatment   Time In 1015         Time Out 1044         Minutes 29         Timed Code Treatment Minutes: Aldair 8330, PT

## 2020-10-15 ENCOUNTER — ANESTHESIA EVENT (OUTPATIENT)
Dept: ENDOSCOPY | Age: 75
DRG: 178 | End: 2020-10-15
Payer: MEDICARE

## 2020-10-15 ENCOUNTER — ANESTHESIA (OUTPATIENT)
Dept: ENDOSCOPY | Age: 75
DRG: 178 | End: 2020-10-15
Payer: MEDICARE

## 2020-10-15 VITALS
OXYGEN SATURATION: 98 % | RESPIRATION RATE: 10 BRPM | SYSTOLIC BLOOD PRESSURE: 110 MMHG | DIASTOLIC BLOOD PRESSURE: 76 MMHG

## 2020-10-15 LAB
ANCA MYELOPEROXIDASE: 14 AU/ML
ANCA PROTEINASE 3: 12 AU/ML
C-REACTIVE PROTEIN: 54.8 MG/L (ref 0–5)
FOLATE: 17.2 NG/ML
VITAMIN B-12: 312 PG/ML (ref 232–1245)

## 2020-10-15 PROCEDURE — 6370000000 HC RX 637 (ALT 250 FOR IP): Performed by: NURSE PRACTITIONER

## 2020-10-15 PROCEDURE — 1200000000 HC SEMI PRIVATE

## 2020-10-15 PROCEDURE — 2500000003 HC RX 250 WO HCPCS: Performed by: NURSE ANESTHETIST, CERTIFIED REGISTERED

## 2020-10-15 PROCEDURE — 86140 C-REACTIVE PROTEIN: CPT

## 2020-10-15 PROCEDURE — 7100000010 HC PHASE II RECOVERY - FIRST 15 MIN: Performed by: INTERNAL MEDICINE

## 2020-10-15 PROCEDURE — 6360000002 HC RX W HCPCS: Performed by: INTERNAL MEDICINE

## 2020-10-15 PROCEDURE — 6360000002 HC RX W HCPCS: Performed by: STUDENT IN AN ORGANIZED HEALTH CARE EDUCATION/TRAINING PROGRAM

## 2020-10-15 PROCEDURE — 3700000000 HC ANESTHESIA ATTENDED CARE: Performed by: INTERNAL MEDICINE

## 2020-10-15 PROCEDURE — 6360000002 HC RX W HCPCS: Performed by: NURSE ANESTHETIST, CERTIFIED REGISTERED

## 2020-10-15 PROCEDURE — 7100000011 HC PHASE II RECOVERY - ADDTL 15 MIN: Performed by: INTERNAL MEDICINE

## 2020-10-15 PROCEDURE — 2709999900 HC NON-CHARGEABLE SUPPLY: Performed by: INTERNAL MEDICINE

## 2020-10-15 PROCEDURE — 99233 SBSQ HOSP IP/OBS HIGH 50: CPT | Performed by: INTERNAL MEDICINE

## 2020-10-15 PROCEDURE — 6370000000 HC RX 637 (ALT 250 FOR IP): Performed by: INTERNAL MEDICINE

## 2020-10-15 PROCEDURE — 99232 SBSQ HOSP IP/OBS MODERATE 35: CPT | Performed by: INTERNAL MEDICINE

## 2020-10-15 PROCEDURE — 43236 UPPR GI SCOPE W/SUBMUC INJ: CPT | Performed by: INTERNAL MEDICINE

## 2020-10-15 PROCEDURE — 3609013800 HC EGD SUBMUCOSAL/BOTOX INJECTION: Performed by: INTERNAL MEDICINE

## 2020-10-15 PROCEDURE — 2580000003 HC RX 258: Performed by: INTERNAL MEDICINE

## 2020-10-15 PROCEDURE — 2580000003 HC RX 258: Performed by: STUDENT IN AN ORGANIZED HEALTH CARE EDUCATION/TRAINING PROGRAM

## 2020-10-15 PROCEDURE — APPSS30 APP SPLIT SHARED TIME 16-30 MINUTES: Performed by: PHYSICIAN ASSISTANT

## 2020-10-15 PROCEDURE — 3700000001 HC ADD 15 MINUTES (ANESTHESIA): Performed by: INTERNAL MEDICINE

## 2020-10-15 PROCEDURE — 94640 AIRWAY INHALATION TREATMENT: CPT

## 2020-10-15 PROCEDURE — 36415 COLL VENOUS BLD VENIPUNCTURE: CPT

## 2020-10-15 PROCEDURE — 97535 SELF CARE MNGMENT TRAINING: CPT

## 2020-10-15 PROCEDURE — 6370000000 HC RX 637 (ALT 250 FOR IP): Performed by: STUDENT IN AN ORGANIZED HEALTH CARE EDUCATION/TRAINING PROGRAM

## 2020-10-15 PROCEDURE — 3E0G8GC INTRODUCTION OF OTHER THERAPEUTIC SUBSTANCE INTO UPPER GI, VIA NATURAL OR ARTIFICIAL OPENING ENDOSCOPIC: ICD-10-PCS | Performed by: INTERNAL MEDICINE

## 2020-10-15 PROCEDURE — 2720000010 HC SURG SUPPLY STERILE: Performed by: INTERNAL MEDICINE

## 2020-10-15 PROCEDURE — 2580000003 HC RX 258: Performed by: NURSE ANESTHETIST, CERTIFIED REGISTERED

## 2020-10-15 RX ORDER — LIDOCAINE HYDROCHLORIDE 10 MG/ML
INJECTION, SOLUTION EPIDURAL; INFILTRATION; INTRACAUDAL; PERINEURAL PRN
Status: DISCONTINUED | OUTPATIENT
Start: 2020-10-15 | End: 2020-10-15 | Stop reason: SDUPTHER

## 2020-10-15 RX ORDER — SODIUM CHLORIDE 9 MG/ML
INJECTION, SOLUTION INTRAVENOUS CONTINUOUS PRN
Status: DISCONTINUED | OUTPATIENT
Start: 2020-10-15 | End: 2020-10-15 | Stop reason: SDUPTHER

## 2020-10-15 RX ORDER — ASCORBIC ACID 500 MG
500 TABLET ORAL DAILY
Status: DISCONTINUED | OUTPATIENT
Start: 2020-10-15 | End: 2020-10-18 | Stop reason: HOSPADM

## 2020-10-15 RX ORDER — PROPOFOL 10 MG/ML
INJECTION, EMULSION INTRAVENOUS PRN
Status: DISCONTINUED | OUTPATIENT
Start: 2020-10-15 | End: 2020-10-15 | Stop reason: SDUPTHER

## 2020-10-15 RX ORDER — LANOLIN ALCOHOL/MO/W.PET/CERES
325 CREAM (GRAM) TOPICAL 2 TIMES DAILY WITH MEALS
Status: DISCONTINUED | OUTPATIENT
Start: 2020-10-15 | End: 2020-10-18 | Stop reason: HOSPADM

## 2020-10-15 RX ORDER — SODIUM CHLORIDE 9 MG/ML
INJECTION, SOLUTION INTRAVENOUS CONTINUOUS
Status: DISCONTINUED | OUTPATIENT
Start: 2020-10-15 | End: 2020-10-18 | Stop reason: HOSPADM

## 2020-10-15 RX ADMIN — FERROUS SULFATE TAB EC 325 MG (65 MG FE EQUIVALENT) 325 MG: 325 (65 FE) TABLET DELAYED RESPONSE at 14:15

## 2020-10-15 RX ADMIN — ACETAMINOPHEN 650 MG: 325 TABLET ORAL at 00:57

## 2020-10-15 RX ADMIN — THERA TABS 1 TABLET: TAB at 14:14

## 2020-10-15 RX ADMIN — PANCRELIPASE 36000 UNITS: 24000; 76000; 120000 CAPSULE, DELAYED RELEASE PELLETS ORAL at 18:00

## 2020-10-15 RX ADMIN — CETIRIZINE HYDROCHLORIDE 10 MG: 10 TABLET ORAL at 14:16

## 2020-10-15 RX ADMIN — OXYCODONE HYDROCHLORIDE AND ACETAMINOPHEN 500 MG: 500 TABLET ORAL at 14:13

## 2020-10-15 RX ADMIN — METOPROLOL SUCCINATE 25 MG: 25 TABLET, FILM COATED, EXTENDED RELEASE ORAL at 14:14

## 2020-10-15 RX ADMIN — FERROUS SULFATE TAB EC 325 MG (65 MG FE EQUIVALENT) 325 MG: 325 (65 FE) TABLET DELAYED RESPONSE at 18:00

## 2020-10-15 RX ADMIN — BUDESONIDE AND FORMOTEROL FUMARATE DIHYDRATE 2 PUFF: 160; 4.5 AEROSOL RESPIRATORY (INHALATION) at 07:54

## 2020-10-15 RX ADMIN — SODIUM CHLORIDE: 9 INJECTION, SOLUTION INTRAVENOUS at 08:20

## 2020-10-15 RX ADMIN — IPRATROPIUM BROMIDE AND ALBUTEROL SULFATE 1 AMPULE: .5; 3 SOLUTION RESPIRATORY (INHALATION) at 16:25

## 2020-10-15 RX ADMIN — ALBUTEROL 2 MG: 2 TABLET ORAL at 20:03

## 2020-10-15 RX ADMIN — IPRATROPIUM BROMIDE AND ALBUTEROL SULFATE 1 AMPULE: .5; 3 SOLUTION RESPIRATORY (INHALATION) at 07:54

## 2020-10-15 RX ADMIN — PROPOFOL 140 MG: 10 INJECTION, EMULSION INTRAVENOUS at 10:43

## 2020-10-15 RX ADMIN — GABAPENTIN 300 MG: 300 CAPSULE ORAL at 14:14

## 2020-10-15 RX ADMIN — IPRATROPIUM BROMIDE AND ALBUTEROL SULFATE 1 AMPULE: .5; 3 SOLUTION RESPIRATORY (INHALATION) at 20:38

## 2020-10-15 RX ADMIN — SODIUM CHLORIDE 1.5 G: 900 INJECTION INTRAVENOUS at 20:03

## 2020-10-15 RX ADMIN — BUMETANIDE 1 MG: 1 TABLET ORAL at 14:16

## 2020-10-15 RX ADMIN — TRAMADOL HYDROCHLORIDE 50 MG: 50 TABLET, COATED ORAL at 22:04

## 2020-10-15 RX ADMIN — ALBUTEROL 2 MG: 2 TABLET ORAL at 14:14

## 2020-10-15 RX ADMIN — METOPROLOL SUCCINATE 25 MG: 25 TABLET, FILM COATED, EXTENDED RELEASE ORAL at 20:03

## 2020-10-15 RX ADMIN — SODIUM CHLORIDE: 9 INJECTION, SOLUTION INTRAVENOUS at 10:00

## 2020-10-15 RX ADMIN — PANTOPRAZOLE SODIUM 40 MG: 40 TABLET, DELAYED RELEASE ORAL at 14:14

## 2020-10-15 RX ADMIN — BUDESONIDE AND FORMOTEROL FUMARATE DIHYDRATE 2 PUFF: 160; 4.5 AEROSOL RESPIRATORY (INHALATION) at 20:40

## 2020-10-15 RX ADMIN — SODIUM CHLORIDE 1.5 G: 900 INJECTION INTRAVENOUS at 02:00

## 2020-10-15 RX ADMIN — SODIUM CHLORIDE 1.5 G: 900 INJECTION INTRAVENOUS at 14:13

## 2020-10-15 RX ADMIN — TRAMADOL HYDROCHLORIDE 50 MG: 50 TABLET, COATED ORAL at 12:35

## 2020-10-15 RX ADMIN — SODIUM CHLORIDE: 9 INJECTION, SOLUTION INTRAVENOUS at 14:17

## 2020-10-15 RX ADMIN — SODIUM CHLORIDE 1.5 G: 900 INJECTION INTRAVENOUS at 07:48

## 2020-10-15 RX ADMIN — LIDOCAINE HYDROCHLORIDE 50 MG: 10 INJECTION, SOLUTION EPIDURAL; INFILTRATION; INTRACAUDAL; PERINEURAL at 10:43

## 2020-10-15 RX ADMIN — GABAPENTIN 300 MG: 300 CAPSULE ORAL at 20:03

## 2020-10-15 RX ADMIN — ROPINIROLE HYDROCHLORIDE 0.25 MG: 0.25 TABLET, FILM COATED ORAL at 20:03

## 2020-10-15 ASSESSMENT — PAIN DESCRIPTION - PROGRESSION
CLINICAL_PROGRESSION: NOT CHANGED
CLINICAL_PROGRESSION: GRADUALLY IMPROVING
CLINICAL_PROGRESSION: GRADUALLY IMPROVING
CLINICAL_PROGRESSION: NOT CHANGED
CLINICAL_PROGRESSION: GRADUALLY IMPROVING

## 2020-10-15 ASSESSMENT — PAIN - FUNCTIONAL ASSESSMENT: PAIN_FUNCTIONAL_ASSESSMENT: ACTIVITIES ARE NOT PREVENTED

## 2020-10-15 ASSESSMENT — PAIN DESCRIPTION - PAIN TYPE
TYPE: ACUTE PAIN;CHRONIC PAIN
TYPE: ACUTE PAIN

## 2020-10-15 ASSESSMENT — PAIN DESCRIPTION - LOCATION
LOCATION: NECK
LOCATION: HEAD

## 2020-10-15 ASSESSMENT — PAIN DESCRIPTION - FREQUENCY
FREQUENCY: INTERMITTENT
FREQUENCY: CONTINUOUS
FREQUENCY: INTERMITTENT

## 2020-10-15 ASSESSMENT — PAIN SCALES - GENERAL
PAINLEVEL_OUTOF10: 7
PAINLEVEL_OUTOF10: 7
PAINLEVEL_OUTOF10: 8
PAINLEVEL_OUTOF10: 5
PAINLEVEL_OUTOF10: 5
PAINLEVEL_OUTOF10: 9
PAINLEVEL_OUTOF10: 4
PAINLEVEL_OUTOF10: 5
PAINLEVEL_OUTOF10: 0

## 2020-10-15 ASSESSMENT — PAIN DESCRIPTION - DESCRIPTORS
DESCRIPTORS: ACHING
DESCRIPTORS: ACHING
DESCRIPTORS: ACHING;HEADACHE
DESCRIPTORS: STABBING

## 2020-10-15 ASSESSMENT — PAIN DESCRIPTION - ONSET
ONSET: ON-GOING
ONSET: ON-GOING

## 2020-10-15 ASSESSMENT — PAIN DESCRIPTION - DIRECTION: RADIATING_TOWARDS: POSTERIOR HEAD

## 2020-10-15 ASSESSMENT — PAIN DESCRIPTION - ORIENTATION
ORIENTATION: POSTERIOR
ORIENTATION: MID
ORIENTATION: RIGHT;OTHER (COMMENT)

## 2020-10-15 NOTE — ANESTHESIA POSTPROCEDURE EVALUATION
Department of Anesthesiology  Postprocedure Note    Patient: Fifi Noel  MRN: [id number]  YOB: 1945  Date of evaluation: 10/15/2020  Time:  1:33 PM     Procedure Summary     Date:  10/15/20 Room / Location:  [address] [place] [address]    Anesthesia Start:  1835 Anesthesia Stop:  1110    Procedure:  EGD SUBMUCOSAL/BOTOX INJECTION tattoo  (N/A ) Diagnosis:  (DYSPHAGIA, ESOPHAGEAL NARROWING)    Surgeon:  Glena Merlin, MD Responsible Provider:  Hilda Lawrence MD    Anesthesia Type:  MAC, TIVA ASA Status:  3          Anesthesia Type: MAC, TIVA    Mark Phase I: Mark Score: 10    Mark Phase II: Mark Score: 10    Last vitals: Reviewed and per EMR flowsheets.        Anesthesia Post Evaluation    Patient location during evaluation: PACU  Patient participation: complete - patient participated  Level of consciousness: awake and alert  Pain score: 0  Airway patency: patent  Nausea & Vomiting: no nausea and no vomiting  Complications: no  Cardiovascular status: hemodynamically stable  Respiratory status: room air  Hydration status: euvolemic

## 2020-10-15 NOTE — OP NOTE
Operative Note      Patient: Rafa Bailey  YOB: 1945  MRN: [id number]    Date of Procedure: 10/15/2020    Pre-Op Diagnosis: DYSPHAGIA, ESOPHAGEAL NARROWING    Post-Op Diagnosis: Normal esophagus. Evidence of vertical banded gastroplasty with a narrow outlet. Food bezoar in the gastric pouch. Procedure(s):  EGD SUBMUCOSAL/BOTOX INJECTION tattoo     Surgeon(s):  Espinoza May MD    Assistant:   First Assistant: Zara Carson RN    Anesthesia: Monitor Anesthesia Care    Estimated Blood Loss (mL): Minimal    Complications: None    Specimens:   * No specimens in log *    Implants:  * No implants in log *      Drains: * No LDAs found *    Findings:  1. Normal esophagus. 2. Evidence of vertical banded gastroplasty with a narrow outlet measuring 10 mm in diameter. 3. Large amount of food bezoar in the gastric pouch interfering with visualization. 4. The mucosa appeared normal.  5. The adult endoscope was traversed through the gastric pouch outlet and entered into the remnant stomach. The antrum and pylorus appeared normal.  6. The retroflexed view of the excluded stomach appeared normal.  7. The staple line from prior gastroplasty was intact. This was tattooed using [place] ink.  8. The examined duodenum up to the second portion appeared normal.      Recommendations  1. Start pancreatic lipase enzymes 36,000 units 3 times daily with meals. 2. Start full liquid diet and encourage use of carbonated cola drinks. 3. We will need repeat upper GI in 1 to 2 days to assess the status of the gastric pouch and outlet  4. The options for correction include revision bariatric surgery versus Endo bariatrics. 5. Endo bariatrics option include creation of a GG fistula from the gastric pouch into the excluded stomach and placement of a fully covered metal stent. 6. We will discuss options with the patient.       Detailed Description of Procedure:   Informed consent was obtained from the patient after explanation of the procedure including indications, description of the procedure,  benefits and possible risks and complications of the procedure, and alternatives. Questions were answered. The patient's history was reviewed and a directed physical examination was performed prior to the procedure. Patient was monitored throughout the procedure with pulse oximetry and periodic assessment of vital signs. Patient was sedated as noted above. With the patient in the left lateral decubitus position, the Olympus videoendoscope was placed in the patient's mouth and under direct visualization passed into the esophagus. Visualization of the esophagus, stomach, and duodenum was performed during both introduction and withdrawal of the endoscope and retroflexed view of the proximal stomach was obtained. The scope was passed to the 2nd portion of the duodenum. The patient tolerated the procedure well and was taken to the recovery area in good condition. The patient  was taken to the recovery area in good condition.     Electronically signed by Yanira Mancera MD on 10/15/2020 at 11:13 AM

## 2020-10-15 NOTE — PROGRESS NOTES
Occupational Therapy  Facility/Department: [facility]  Daily Treatment Note  NAME: Isabela Gunn  : 1945  MRN: [id number]    Date of Service: 10/15/2020    Discharge Recommendations:  Patient would benefit from continued therapy after discharge       Assessment   Performance deficits / Impairments: Decreased functional mobility ; Decreased ADL status; Decreased endurance;Decreased balance;Decreased high-level IADLs;Decreased safe awareness  Assessment: Pt would benefit from cont OT services to increase strength, endurance and safety w/ADLs, functional mobiliy/transfers, and IADLs. Prognosis: Good  REQUIRES OT FOLLOW UP: Yes  Activity Tolerance  Activity Tolerance: Patient Tolerated treatment well;Patient limited by fatigue;Patient limited by pain  Safety Devices  Safety Devices in place: Yes  Type of devices: All fall risk precautions in place; Bed alarm in place;Call light within reach; Patient at risk for falls; Left in bed;Nurse notified         Patient Diagnosis(es): The encounter diagnosis was Pneumonia due to organism.       has a past medical history of Acute superficial gastritis without hemorrhage, Anastomotic stricture of stomach, Asthma, Atrial fibrillation ([facility] [address] [facility] Calculus of gallbladder without cholecystitis without obstruction, Chronic diastolic heart failure (HCC), Chronic rhinosinusitis, Class 2 severe obesity due to excess calories with serious comorbidity and body mass index (BMI) of 36.0 to 36.9 in [facility]), COPD (chronic obstructive pulmonary disease) ([facility] [address] E. coli septicemia ([facility] [address] E. coli UTI, Foot drop, right, Fracture of femur ([facility] [address] Gait disorder, Gastric out let obstruction, GERD (gastroesophageal reflux disease), Hallux valgus, acquired, bilateral, Hyperlipidemia, Hypertension, Impaired hearing, Internal hemorrhoids, Lymphedema of both lower extremities, Nausea & vomiting, OA (osteoarthritis) of knee, bilateral, Obesity, MARIAMA on CPAP, Osteoarthritis, Osteoarthritis of lumbar ;Setup; Dentures; Increased time to complete(able to open containers and feed self)  Grooming: Stand by assistance;Setup; Increased time to complete(wash face and head, dentures cleaned this am)  UE Bathing: Minimal assistance;Setup; Increased time to complete(w/back)  LE Bathing: Minimal assistance;Setup; Increased time to complete(w/feet)  UE Dressing: Minimal assistance;Setup; Increased time to complete(w/gown)  LE Dressing: Maximum assistance;Setup; Increased time to complete(w/footies)  Toileting: Stand by assistance;Setup; Increased time to complete(wearing brief, using urinal at bed side)      Pt in bed upon arrival. At start of tx pt fatigued from anesthesia and EGD. Pt was setup at tray table for self care (see above for LOF). Pt remained in bed, call light and phone in reach and bed alarm activated. RN notified.         Bed mobility  Comment: Pt too fatigued from EGD and preferred to stay in bed for self care         Plan   Plan  Times per week: 3x/wk  Current Treatment Recommendations: Strengthening, Balance Training, Functional Mobility Training, Endurance Training, Safety Education & Training, Patient/Caregiver Education & Training, Equipment Evaluation, Education, & procurement, Self-Care / ADL    Goals  Short term goals  Time Frame for Short term goals: By discharge, pt will  Short term goal 1: perform UB ADLs at Mod I  Short term goal 2: perform LB ADLs at supervision  Short term goal 3: demo good safety awareness during functional mobility tasks at SBA and <1 verbal cue  Short term goal 4: perform 8+ min of dynamic standing balance at SBA for improved engagement in [employer] term goal 5: perform 20+ min of functional activity using EC/WS PRN at SBA for improved engagement in ADLs and IADLs     Therapy Time   Individual Concurrent Group Co-treatment   Time In  1525         Time Out 1620         Minutes  55 total tx time                 [address]/JERSON

## 2020-10-15 NOTE — ANESTHESIA PRE PROCEDURE
Department of Anesthesiology  Preprocedure Note       Name:  Navdeep Caldera   Age:  69 y.o.  :  1945                                          MRN:  [id number]         Date:  10/15/2020      Surgeon: Diya Aguirre):  Howie Gonzáles MD    Procedure: Procedure(s):  EGD ESOPHAGOGASTRODUODENOSCOPY    Medications prior to admission:   Prior to Admission medications    Medication Sig Start Date End Date Taking? Authorizing Provider   bumetanide (BUMEX) 1 MG tablet TAKE 2 TABLETS BY MOUTH ON MONDAY, WEDNESDAY AND FRIDAY.  TAKE 1 TABLET BY MOUTH ON ALL OTHER DAYS 20   Rupa Correia PA-C   rOPINIRole (REQUIP) 0.25 MG tablet TAKE 1 TABLET BY MOUTH TWICE DAILY 20   Rupa Correia PA-C   gabapentin (NEURONTIN) 300 MG capsule TAKE 1 CAPSULE BY MOUTH THREE TIMES DAILY 20  Rupa Correia PA-C   vitamin B-12 (CYANOCOBALAMIN) 100 MCG tablet TAKE 1 TABLET BY MOUTH DAILY AS NEEDED FOR FATIGUE 20   Rupa Correia PA-C   tiZANidine (ZANAFLEX) 4 MG tablet TAKE 1 TABLET BY MOUTH DAILY AS NEEDED 20   Rupa Correia PA-C   spironolactone (ALDACTONE) 25 MG tablet Take 1 tablet by mouth daily 20   Rupa Correia PA-C   metoprolol succinate (TOPROL XL) 25 MG extended release tablet Take 1 tablet by mouth 2 times daily 20   Rupa Correia PA-C   rivaroxaban (XARELTO) 20 MG TABS tablet Take 1 tablet by mouth daily 20   Rupa Correia PA-C   pantoprazole (PROTONIX) 40 MG tablet Take 1 tablet by mouth daily 20   Rupa Correia PA-C   potassium chloride (KLOR-CON M) 20 MEQ extended release tablet Take 1 tablet by mouth 2 times daily 20   Rupa Correia PA-C   Multiple Vitamin (MULTI-VITAMINS) TABS Take 1 tablet by mouth daily 20   Rupa Correia PA-C   cetirizine (ZYRTEC) 10 MG tablet TAKE 1 TABLET BY MOUTH DAILY 20   Rupa Correia PA-C   fluticasone [facility]) 50 MCG/ACT nasal spray 2 sprays by Nasal route daily 3/5/20 Tima Abraham PA-C   guaiFENesin (SM MUCUS RELIEF) 600 MG extended release tablet Take 1 tablet by mouth 2 times daily 3/5/20   Tima Abraham PA-C   D-Mannose 500 MG CAPS Take 500 mg by mouth 3 times daily 11/11/19 12/11/19  Itzel Brian MD   SM LUBRICANT EYE DROPS 0.4-0.3 % ophthalmic solution PLACE 1 DROP INTO BOTH EYES FOUR TIMES DAILY AS NEEDED FOR DRY EYES 10/17/19   Tima Abraham PA-C   Fluticasone furoate-vilanterol (BREO ELLIPTA) 200-25 MCG/INH AEPB inhaler Inhale 1 puff into the lungs daily    Historical Provider, MD   Handicap Placard MISC by Does not apply route 6/27/19   Tima Abraham PA-C   Incontinence Supply Disposable (INCONTINENCE BRIEF LARGE) MISC To use as directed. Use 3x a day 4/30/19   Tima Abraham PA-C   polyethylene glycol [facility]) powder Take 17 g by mouth daily 3/5/19   Tima Abraham PA-C   Foot Care Products (TRI-BALANCE ORTHOTICS MENS) [address] [facility] Provide insurance covered orthotics shoes, wear daily 12/12/18   Tima Abraham PA-C   albuterol (PROVENTIL) 2 MG tablet Use as needed 10/4/18   Aileen Macias MD   ipratropium-albuterol (DUONEB) 0.5-2.5 (3) MG/3ML SOLN nebulizer solution Inhale 1 vial into the lungs every 4 hours as needed     Historical Provider, MD   Armodafinil 200 MG TABS Take 1 tablet by mouth 2 times daily.   4/2/18   Historical Provider, MD       Current medications:    Current Facility-Administered Medications   Medication Dose Route Frequency Provider Last Rate Last Dose    ferrous sulfate (FE TABS 325) EC tablet 325 mg  325 mg Oral BID  Yumi Henderson PA-C        vitamin C (ASCORBIC ACID) tablet 500 mg  500 mg Oral Daily Yumi Henderson PA-C        traMADol Samara Emily) tablet 50 mg  50 mg Oral Q4H PRN Ebbie Leo Brady APRN - CNP   50 mg at 10/14/20 2320    budesonide-formoterol (SYMBICORT) 160-4.5 MCG/ACT inhaler 2 puff  2 puff Inhalation BID Shiva West MD   2 puff at 10/15/20 0754    acetaminophen (TYLENOL) tablet 650 mg  650 mg Oral Q4H PRN Jacquelin Garay APRN - CNP   650 mg at 10/15/20 0057    albuterol (PROVENTIL) tablet 2 mg  2 mg Oral BID Remi Multani, APRN - NP   2 mg at 10/14/20 2043    bumetanide (BUMEX) tablet 1 mg  1 mg Oral Daily Remi Multani, APRN - NP   1 mg at 10/14/20 0840    cetirizine (ZYRTEC) tablet 10 mg  10 mg Oral Daily Remi Multani, APRN - NP   10 mg at 10/14/20 0840    fluticasone (FLONASE) 50 MCG/ACT nasal spray 2 spray  2 spray Nasal Daily Remi Multani, APRN - NP   2 spray at 10/14/20 0841    gabapentin (NEURONTIN) capsule 300 mg  300 mg Oral TID Remi Polancolyssafrederick, APRN - NP   300 mg at 10/14/20 2043    ipratropium-albuterol (DUONEB) nebulizer solution 1 ampule  1 ampule Inhalation Q4H PRN Remi Multani, APRN - NP        multivitamin 1 tablet  1 tablet Oral Daily Remi Multani, APRN - NP   1 tablet at 10/14/20 0840    metoprolol succinate (TOPROL XL) extended release tablet 25 mg  25 mg Oral BID Remi Multani, APRN - NP   25 mg at 10/14/20 2043    pantoprazole (PROTONIX) tablet 40 mg  40 mg Oral Daily Remi Polancolyssafrederick, APRN - NP   40 mg at 10/14/20 0840    [Held by provider] potassium chloride (KLOR-CON M) extended release tablet 20 mEq  20 mEq Oral BID Remi Polancolyssafrederick, APRN - NP   Stopped at 10/12/20 2100    [Held by provider] rivaroxaban (XARELTO) tablet 20 mg  20 mg Oral Daily Remi Polancolyssafrederick, APRN - NP   20 mg at 10/13/20 0851    rOPINIRole (REQUIP) tablet 0.25 mg  0.25 mg Oral BID Remi Sherrilyssafrederick, APRN - NP   0.25 mg at 10/14/20 2043    [Held by provider] spironolactone (ALDACTONE) tablet 25 mg  25 mg Oral Daily JULIAN Fountain - NP        sodium chloride flush 0.9 % injection 10 mL  10 mL Intravenous 2 times per day JULIAN Fountain - NP   10 mL at 10/14/20 2044    sodium chloride flush 0.9 % injection 10 mL  10 mL Intravenous PRN JULIAN Fountain - NP        magnesium hydroxide (MILK OF MAGNESIA) 400 MG/5ML suspension 30 mL  30 mL Oral Daily PRN Munira Morel Rhina Rodriguez APRN - NP        promethazine (PHENERGAN) tablet 12.5 mg  12.5 mg Oral Q6H PRN Kwaku Wanda, APRN - NP        Or    [facility] injection 4 mg  4 mg Intravenous Q6H PRN Kwaku Wanda, APRN - NP   4 mg at 10/12/20 2022    ipratropium-albuterol (DUONEB) nebulizer solution 1 ampule  1 ampule Inhalation Q4H [place] Kwaku Wanda, APRN - NP   1 ampule at 10/15/20 0754    ampicillin-sulbactam (UNASYN) 1.5 g IVPB minibag  1.5 g Intravenous Q6H Dominga Delgado  mL/hr at 10/15/20 0748 1.5 g at 10/15/20 0748    polyethylene glycol (GLYCOLAX) packet 17 g  17 g Oral Daily PRN Dominga Delgado MD           Allergies:     Allergies   Allergen Reactions    Darvocet [Propoxyphene N-Acetaminophen] Hives    Other Hives    Oxycodone-Acetaminophen        Problem List:    Patient Active Problem List   Diagnosis Code    Atrial fibrillation ([facility]) I48.91    Hyperlipidemia E78.5    GERD (gastroesophageal reflux disease) K21.9    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    OA (osteoarthritis) of knee, bilateral M17.10    Hallux valgus, acquired, bilateral M20.11, M20.12    Hypertension I10    Slow transit constipation K59.01    MARIAMA on CPAP G47.33, Z99.89    Class 2 severe obesity due to excess calories with serious comorbidity and body mass index (BMI) of 36.0 to 36.9 in adult ([facility]) E66.01, Z68.36    Simple chronic bronchitis ([facility]) J41.0    Hospital-acquired bacterial pneumonia J15.9    Fluid overload E87.70    Pneumonia due to organism J18.9    COPD (chronic obstructive pulmonary disease) ([facility]) J44.9    Chronic diastolic heart failure ([facility]) I50.32    H/O gastric bypass Z98.84       Past Medical History:        Diagnosis Date    Acute superficial gastritis without hemorrhage 5/26/2018    Anastomotic stricture of stomach     Asthma     Atrial fibrillation (HCC)     On Xarelto 6/27/2020    Calculus of gallbladder without cholecystitis without obstruction 5/2/2017    Chronic diastolic heart failure ([facility] [address] 11/17/2017    Chronic rhinosinusitis 4/13/2015    Class 2 severe obesity due to excess calories with serious comorbidity and body mass index (BMI) of 36.0 to 36.9 in adult ([facility] [address] 11/17/2017    E. coli septicemia ([facility]     E. coli UTI     Foot drop, right 7/10/2012    Fracture of femur ([facility] [address] 10/23/2016    Gait disorder 7/20/2016    Gastric out let obstruction     GERD (gastroesophageal reflux disease)     Hallux valgus, acquired, bilateral 6/24/2015    Hyperlipidemia     Hypertension     Impaired hearing 4/13/2015    Internal hemorrhoids 1/3/2017    Lymphedema of both lower extremities 6/24/2015    Nausea & vomiting 11/16/2018    OA (osteoarthritis) of knee, bilateral 12/11/2006    Obesity     MARIAMA on CPAP 5/2/2017    Osteoarthritis     Osteoarthritis of lumbar spine 12/13/2007     replace inactive diagnosis    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 coli (HCC)     Due to UTI     Simple chronic bronchitis ([facility] [address] 4/10/2018    Slow transit constipation 11/3/2016    Unspecified sleep apnea     UTI (urinary tract infection)        Past Surgical History:        Procedure Laterality Date    CARPAL TUNNEL RELEASE      bilateral    COLONOSCOPY      CYSTOSCOPY  01/02/2019    by Dr. Serge Sandhu N/A 1/2/2019    CYSTOSCOPY performed by Russ Barboza MD at [address]    first knuckle right index finger   [address] [facility]    vertical banded gastroplasty   [address] REPLACEMENT  2004 & 2005    bilateral knees    CT EGD [address] N/A 8/16/2018    EGD FOREIGN BODY REMOVAL performed by Colby Morton MD at [address] [facility] EGD TRANSORAL BIOPSY SINGLE/MULTIPLE N/A 5/25/2018    EGD BIOPSY performed by Sd Hanson DO at [address] [facility]      left shoulder    UPPER GASTROINTESTINAL ENDOSCOPY  08/13/2018    removal of food bolus    UPPER GASTROINTESTINAL ENDOSCOPY N/A 2018    EGD FOREIGN BODY REMOVAL performed by Karen Renner DO at [address] [facility] 2018    EGD BIOPSY performed by Karen Renner DO at [address] [facility]  2018    egd with balloon dilitation    UPPER GASTROINTESTINAL ENDOSCOPY  2018    EGD DILATION BALLOON performed by Stephanie Renteria MD at [address] [place] [address] 10/1/2018    EGD BIOPSY performed by Ewa Gillette MD at [address] [place] [address]  10/1/2018    EGD DILATION BALLOON performed by Ewa Gillette MD at [address] [place] [address] N/A 2018    EGD DILATION BALLOON performed by Ewa Gillette MD at [facility]       Social History:    Social History     Tobacco Use    Smoking status: Former Smoker     Packs/day: 1.00     Years: 20.00     Pack years: 20.00     Last attempt to quit: 1976     Years since quittin.8    Smokeless tobacco: Never Used   Substance Use Topics    Alcohol use: No                                Counseling given: Not Answered      Vital Signs (Current):   Vitals:    10/14/20 2113 10/15/20 0447 10/15/20 0730 10/15/20 0753   BP: 101/63 100/64 (!) 9755    Pulse: 63 70 65    Resp: 16 16 16 18   Temp: 97.7 °F (36.5 °C) 98.1 °F (36.7 °C) 97.9 °F (36.6 °C)    TempSrc: Oral Oral Oral    SpO2:   96%    Weight:       Height:                                                  BP Readings from Last 3 Encounters:   10/15/20 (!) 97/55   20 (!) 101/58   20 (!) 99/55       NPO Status:                                                                                 BMI:   Wt Readings from Last 3 Encounters:   10/14/20 216 lb 6.4 oz (98.2 kg)   20 205 lb (93 kg)   20 221 lb (100.2 kg)     Body mass index is 31.05 kg/m².     CBC:   Lab Results   Component Value Date    WBC 5.0 10/14/2020    RBC 3.16 10/14/2020    RBC 4.39 09/13/2011    HGB 9.5 10/14/2020    HCT 32.0 10/14/2020    .3 10/14/2020    RDW 13.8 10/14/2020     10/14/2020     09/13/2011       CMP:   Lab Results   Component Value Date     10/14/2020    K 4.0 10/14/2020    CL 99 10/14/2020    CO2 25 10/14/2020    BUN 12 10/14/2020    CREATININE 0.70 10/14/2020    GFRAA >60 10/14/2020    LABGLOM >60 10/14/2020    GLUCOSE 110 10/14/2020    GLUCOSE 99 09/13/2011    PROT 7.3 10/11/2020    CALCIUM 8.9 10/14/2020    BILITOT 1.12 10/11/2020    ALKPHOS 126 10/11/2020    AST 19 10/11/2020    ALT 9 10/11/2020       POC Tests: No results for input(s): POCGLU, POCNA, POCK, POCCL, POCBUN, POCHEMO, POCHCT in the last 72 hours.     Coags:   Lab Results   Component Value Date    PROTIME 13.7 10/11/2020    PROTIME 16.6 08/16/2018    INR 1.3 10/11/2020    APTT 28.0 10/11/2020       HCG (If Applicable): No results found for: PREGTESTUR, PREGSERUM, HCG, HCGQUANT     ABGs: No results found for: PHART, PO2ART, HXK0EAO, ETN1HDI, BEART, W9QTFZKV     Type & Screen (If Applicable):  No results found for: LABABO, LABRH    Drug/Infectious Status (If Applicable):  Lab Results   Component Value Date    HEPCAB NONREACTIVE 04/10/2017       COVID-19 Screening (If Applicable):   Lab Results   Component Value Date    COVID19 Not Detected 10/11/2020         Anesthesia Evaluation  Patient summary reviewed and Nursing notes reviewed no history of anesthetic complications:   Airway: Mallampati: II  TM distance: >3 FB   Neck ROM: full  Mouth opening: > = 3 FB Dental:    (+) edentulous      Pulmonary:normal exam    (+) COPD:  sleep apnea: on CPAP,  asthma:                            Cardiovascular:  Exercise tolerance: good (>4 METS),   (+) hypertension:,     (-) CAD, CABG/stent, dysrhythmias,  angina and  CHF      Rhythm: regular  Rate: normal           Beta Blocker:  Dose within 24 Hrs         Neuro/Psych:   Negative Neuro/Psych ROS              GI/Hepatic/Renal:   (+) GERD:, Endo/Other: Negative Endo/Other ROS                    Abdominal:           Vascular:                                      Anesthesia Plan      MAC and TIVA     ASA 3       Induction: intravenous. MIPS: Postoperative opioids intended and Prophylactic antiemetics administered. Anesthetic plan and risks discussed with patient.       Plan discussed with CRNA and surgical team.                  Sreekanth Johnson MD   10/15/2020

## 2020-10-15 NOTE — PROGRESS NOTES
PULMONARY & CRITICAL CARE MEDICINE CONSULT NOTE     Patient:  Julia Mejia  MRN: [id number]  Admit date: 10/11/2020  Primary Care Physician: Esteban Zafar PA-C  Consulting Physician: Danish Mai DO  CODE Status: Full Code     HISTORY     CHIEF COMPLAINT/REASON FOR CONSULT: Hemoptysis / recurrent pneumonia. Interval History  Afebrile. Non-tachycardic   Hemodynamically stable. Saturating well on RA at this time. CRP 5.6->94.2  No leukocytosis. Legionella, strep pneumonia, and sputum culture negative. Blood cultures remain negative. Barium swallow showed no laryngeal penetration or aspiration of all tested consistencies. Premature vallecular spillage of all consistencies. Per GI will plan for EGD with possible dilation today. Denies fevers, chills, nausea, vomiting, diarrhea. States shortness of breath is improving. HISTORY OF PRESENT ILLNESS:  The patient is a 69 y.o. male with a significant past medical history of aspiration pneumonia, atrial fibrillation, CHF, MARIAMA on CPAP presented to the hospital with shortness of breath and fever. Patient was admitted for the management of pneumonia. Patient noted to have COPD what he felt as a COPD exacerbation. On Sunday patient noted to have an episode of coughing that led to emesis while his CPAP mask was on and he believed he aspirated some vomitus. This led to shortness of breath over the next couple of days. Episode of hemoptysis with streaked red blood in the sputum. Patient is a former smoker. No recent travel or surgeries. Though recent hospitalizations in June of this year. Has some mild pleuritic chest pain in the left side. Increased shortness of breath. Denies abdominal pain, diarrhea, focal neurological deficits, unintentional weight loss, dysphagia, odynophagia, hematemesis. Patient presented with a low-grade fever, nontachycardic, and hemodynamically stable. Room air saturating well.   Initial BMP unremarkable. proBNP elevated at 4000. Troponin 50. Hepatic panel negative. BC demonstrated normocytic anemia. No leukocytosis. COVID-19 negative. Respiratory cultures negative thus far. Pneumonia antigen negative. Chest x-ray on 10/11/2020 demonstrated multifocal infiltrate in the right lung as well as left lung base. CT chest on 10/11 showed multifocal pneumonia right greater than left. No evidence of pulmonary embolism. Cardiomegaly noted. There was subcarinal lymph node enlargement measuring 2.1 x 2.4 cm. Mildly enlarged right hilar lymph node. Of note patient has a history of prior gastric banding in 9971 with complications of gastric outlet obstruction requiring intermittent dilations. EGD in 10/2018 with dilation to 15 mm. Patient had another EGD 11/2018 with banding. Patient has also been treated for multiple pneumonias. In 6/2020 patient was treated for bradycardia and chf exacerbation at [facility]. Patient was also treated with vancomycin /zosyn x 7 days for possible aspiration pneumonia. Patient was then monitored off abx. Admitted again at [facility] and treated for MDR enterobacter from the sputum that grew from the facility at [facility] with meropenem. Patient discharged on meropenem  Until 7/7/2020.        PAST MEDICAL HISTORY:        Diagnosis Date    Acute superficial gastritis without hemorrhage 5/26/2018    Anastomotic stricture of stomach     Asthma     Atrial fibrillation ([address]     On Xarelto 6/27/2020    Calculus of gallbladder without cholecystitis without obstruction 5/2/2017    Chronic diastolic heart failure ([address] 11/17/2017    Chronic rhinosinusitis 4/13/2015    Class 2 severe obesity due to excess calories with serious comorbidity and body mass index (BMI) of 36.0 to 36.9 in adult ([address] 11/17/2017    E. coli septicemia ([address]     E. 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 coli (HCC)     Due to UTI     Simple chronic bronchitis ([address] 4/10/2018    Slow transit constipation 11/3/2016    Unspecified sleep apnea     UTI (urinary tract infection)      PAST SURGICAL HISTORY:        Procedure Laterality Date    CARPAL TUNNEL RELEASE      bilateral    COLONOSCOPY      CYSTOSCOPY  01/02/2019    by Dr. Scott Carrillo N/A 1/2/2019    CYSTOSCOPY performed by Rupesh Britton MD at [address]    first knuckle right index finger   [address] [facility]    vertical banded gastroplasty   [address] REPLACEMENT  2004 & 2005    bilateral knees    [place] EGD [address] N/A 8/16/2018    EGD FOREIGN BODY REMOVAL performed by Klaudia Pedro MD at [address] [facility] EGD TRANSORAL BIOPSY SINGLE/MULTIPLE N/A 5/25/2018    EGD BIOPSY performed by Tanya Little DO at [address] [facility]      left shoulder    UPPER GASTROINTESTINAL ENDOSCOPY  08/13/2018    removal of food bolus    UPPER GASTROINTESTINAL ENDOSCOPY N/A 8/13/2018    EGD FOREIGN BODY REMOVAL performed by Tanya Little DO at [address] [facility] 8/13/2018    EGD BIOPSY performed by Tanya Little DO at [address]  08/16/2018    egd with balloon dilitation    UPPER GASTROINTESTINAL ENDOSCOPY  8/16/2018    EGD DILATION BALLOON performed by Karime Borjas MD at [address] [facility] N/A 10/1/2018    EGD BIOPSY performed by Reji Saenz MD at [address] [facility] ENDOSCOPY  10/1/2018    EGD DILATION BALLOON performed by Reji Saenz MD at [address] [facility] N/A 11/19/2018    EGD DILATION BALLOON performed by Reji Saenz MD at [facility] Endoscopy     FAMILY HISTORY:       Problem Relation Age of Onset    Cancer Mother     Heart Attack Father      SOCIAL HISTORY:   TOBACCO:   reports that he quit smoking about 43 years ago. He has a 20.00 pack-year smoking history. He has never used smokeless tobacco.  ETOH:  reports no history of alcohol use. DRUGS: reports no history of drug use. ALLERGIES:    Allergies   Allergen Reactions    Darvocet [Propoxyphene N-Acetaminophen] Hives    Other Hives    Oxycodone-Acetaminophen          HOME MEDICATIONS:  Prior to Admission medications    Medication Sig Start Date End Date Taking? Authorizing Provider   bumetanide (BUMEX) 1 MG tablet TAKE 2 TABLETS BY MOUTH ON MONDAY, WEDNESDAY AND FRIDAY.  TAKE 1 TABLET BY MOUTH ON ALL OTHER DAYS 9/17/20   Vera Romberg, PA-C   rOPINIRole (REQUIP) 0.25 MG tablet TAKE 1 TABLET BY MOUTH TWICE DAILY 8/20/20   Vera Romberg, PA-C   gabapentin (NEURONTIN) 300 MG capsule TAKE 1 CAPSULE BY MOUTH THREE TIMES DAILY 9/18/20 11/17/20  Vera Romberg, PA-C   vitamin B-12 (CYANOCOBALAMIN) 100 MCG tablet TAKE 1 TABLET BY MOUTH DAILY AS NEEDED FOR FATIGUE 7/23/20   Vera Romberg, PA-C   tiZANidine (ZANAFLEX) 4 MG tablet TAKE 1 TABLET BY MOUTH DAILY AS NEEDED 7/23/20   Vera Romberg, PA-C   spironolactone (ALDACTONE) 25 MG tablet Take 1 tablet by mouth daily 7/16/20   Vera Romberg, PA-C   metoprolol succinate (TOPROL XL) 25 MG extended release tablet Take 1 tablet by mouth 2 times daily 7/16/20   Vera Romberg, PA-C   rivaroxaban (XARELTO) 20 MG TABS tablet Take 1 tablet by mouth daily 7/16/20   Kamari Morel PA-C   pantoprazole (PROTONIX) 40 MG tablet Take 1 tablet by mouth daily 7/16/20   Kamari Morel PA-C   potassium chloride (KLOR-CON M) 20 MEQ extended release tablet Take 1 tablet by mouth 2 times daily 7/16/20   Kamari Morel PA-C   Multiple Vitamin (MULTI-VITAMINS) TABS Take 1 tablet by mouth daily 7/16/20   Kamari Morel PA-C   cetirizine (ZYRTEC) 10 MG tablet TAKE 1 TABLET BY MOUTH DAILY 5/27/20   Kamari Morel PA-C   fluticasone Tiffany Ran) 50 MCG/ACT nasal spray 2 sprays by Nasal route daily 3/5/20   Kamari Morel PA-C   guaiFENesin (SM MUCUS RELIEF) 600 MG extended release tablet Take 1 tablet by mouth 2 times daily 3/5/20   Kamari Morel PA-C   D-Mannose 500 MG CAPS Take 500 mg by mouth 3 times daily 11/11/19 12/11/19  Itzel Islas MD   SM LUBRICANT EYE DROPS 0.4-0.3 % ophthalmic solution PLACE 1 DROP INTO BOTH EYES FOUR TIMES DAILY AS NEEDED FOR DRY EYES 10/17/19   Kamari Morel PA-C   Fluticasone furoate-vilanterol (BREO ELLIPTA) 200-25 MCG/INH AEPB inhaler Inhale 1 puff into the lungs daily    Historical Provider, MD   Handicap Placard MISC by Does not apply route 6/27/19   Kamari Morel PA-C   Incontinence Supply Disposable (INCONTINENCE BRIEF LARGE) MISC To use as directed. Use 3x a day 4/30/19   Kamari Morel PA-C   polyethylene glycol [facility]) powder Take 17 g by mouth daily 3/5/19   Kamari Morel PA-C   Foot Care Products (TRI-BALANCE ORTHOTICS MENS) [address] [facility] Provide insurance covered orthotics shoes, wear daily 12/12/18   Kamari Morel PA-C   albuterol (PROVENTIL) 2 MG tablet Use as needed 10/4/18   Annie Duckworth MD   ipratropium-albuterol (DUONEB) 0.5-2.5 (3) MG/3ML SOLN nebulizer solution Inhale 1 vial into the lungs every 4 hours as needed     Historical Provider, MD   Armodafinil 200 MG TABS Take 1 tablet by mouth 2 times daily.   4/2/18   Historical Provider, MD     IMMUNIZATIONS:  Most Recent Immunizations   Administered Date(s) Administered    Influenza Vaccine, unspecified formulation 2015    Influenza Virus Vaccine 2015    Influenza, Quadv, IM, PF (6 mo and older Fluzone, Flulaval, Fluarix, and 3 yrs and older Afluria) 2019    Pneumococcal Conjugate 13-valent ([id number]) 2018    Pneumococcal Polysaccharide ([id number]) 10/31/2019    Tdap (Boostrix, Adacel) 2019       REVIEW OF SYSTEMS:  Unobtainable from patient due to sedation/mechanical ventilation    PHYSICAL EXAMINATION     VITAL SIGNS:   LAST-  /64   Pulse 70   Temp 98.1 °F (36.7 °C) (Oral)   Resp 16   Ht 5' 10\" (1.778 m)   Wt 216 lb 6.4 oz (98.2 kg)   SpO2 97%   BMI 31.05 kg/m²   8-24 HR RANGE-  TEMP Temp  Av °F (36.7 °C)  Min: 97.7 °F (36.5 °C)  Max: 98.3 °F (81.8 °C)   BP Systolic (61LKL), ZVP:173 , Min:100 , AMM:755      Diastolic (26ZOQ), LEXUS:33, Min:63, Max:69     PULSE Pulse  Av.2  Min: 63  Max: 79   RR Resp  Av  Min: 16  Max: 16   O2 SAT No data recorded   OXYGEN DELIVERY No data recorded     SYSTEMIC EXAMINATION:   General appearance - cooperative. Mental status - A&O x 3   Eyes - pupils equal and reactive, sclera anicteric  Mouth - mucous membranes moist, pharynx normal without lesions  Neck - supple, no significant adenopathy, carotids upstroke normal bilaterally, no bruits  Chest - Course breath sounds on the right lung field. Diminished breath sounds.    Heart - normal rate, regular rhythm, normal S1, S2, no murmurs, rubs, clicks or gallops  Abdomen - soft, nontender, nondistended, no masses or organomegaly  Neurological - DTR's normal and symmetric, motor and sensory grossly normal bilaterally  Extremities - peripheral pulses normal, no pedal edema, no clubbing or cyanosis  Skin - normal coloration and turgor, no rashes, no suspicious skin lesions noted     DATA REVIEW     Medications: Current Inpatient  Scheduled Meds:   budesonide-formoterol  2 puff Inhalation BID    albuterol  2 mg Oral BID    bumetanide  1 mg Oral Daily    cetirizine  10 mg Oral Daily    fluticasone  2 spray Nasal Daily    gabapentin  300 mg Oral TID    multivitamin  1 tablet Oral Daily    metoprolol succinate  25 mg Oral BID    pantoprazole  40 mg Oral Daily    [Held by provider] potassium chloride  20 mEq Oral BID    [Held by provider] rivaroxaban  20 mg Oral Daily    rOPINIRole  0.25 mg Oral BID    [Held by provider] spironolactone  25 mg Oral Daily    sodium chloride flush  10 mL Intravenous 2 times per day    ipratropium-albuterol  1 ampule Inhalation Q4H WA    ampicillin-sulbactam  1.5 g Intravenous Q6H     Continuous Infusions:  INPUT/OUTPUT:  In: 495 [P.O.:360; I.V.:135]  Out: 650 [Urine:650]  Date 10/15/20 0000 - 10/15/20 2359   Shift 5722-2879 3514-0852 6301-0429 24 Hour Total   INTAKE   P.O.(mL/kg/hr) 240   240   I. V.(mL/kg) 135(1.4)   135(1.4)   Shift Total(mL/kg) 375(3.8)   375(3.8)   OUTPUT   Urine(mL/kg/hr) 450   450   Shift Total(mL/kg) 450(4.6)   450(4.6)   Weight (kg) 98.2 98.2 98.2 98.2       LABS:-  ABG:   No results for input(s): POCPH, POCPCO2, POCPO2, POCHCO3, OEVV6CEV in the last 72 hours. CBC:   Recent Labs     10/13/20  0927 10/14/20  0917   WBC 5.7 5.0   HGB 9.6* 9.5*   HCT 31.9* 32.0*   .9* 101.3    144   LYMPHOPCT 16* 15*   RBC 3.07* 3.16*   MCH 31.3 30.1   MCHC 30.1 29.7   RDW 14.5* 13.8     BMP:   Recent Labs     10/13/20  0927 10/14/20  0917    137   K 3.8 4.0   CL 99 99   CO2 28 25   BUN 12 12   CREATININE 0.66* 0.70   GLUCOSE 113* 110*     Liver Function Test:   No results for input(s): PROT, LABALBU, ALT, AST, GGT, ALKPHOS, BILITOT in the last 72 hours. Amylase/Lipase:  No results for input(s): AMYLASE, LIPASE in the last 72 hours. Coagulation Profile:   No results for input(s): INR, PROTIME, APTT in the last 72 hours.   Cardiac Enzymes:  No results for input(s): CKTOTAL, CKMB, CKMBINDEX, TROPONINI in the last 72 hours. Lactic Acid:  Lab Results   Component Value Date    LACTA 1.4 05/24/2018     BNP:   No results found for: BNP  D-Dimer:  Lab Results   Component Value Date    DDIMER 0.74 10/11/2020     Others:   Lab Results   Component Value Date    TSH 2.74 01/04/2016    L2PQWWW 3.9 (L) 01/04/2016     Lab Results   Component Value Date    REED NEGATIVE 10/13/2020    CRP 94.2 (H) 10/13/2020     No results found for: Melissa Gottron  Lab Results   Component Value Date    IRON 23 (L) 10/14/2020    TIBC 260 10/14/2020    FERRITIN 28 (L) 10/11/2020     No results found for: SPEP, UPEP  Lab Results   Component Value Date    PSA 0.30 10/30/2019     Microbiology:    Pathology:    Radiology Reports:  FL MODIFIED BARIUM SWALLOW W VIDEO   Final Result   No laryngeal penetration or tracheal aspiration of all tested consistencies. Premature vallecular spillage of all consistencies. Please see separate speech pathology report for full discussion of findings   and recommendations. CT CHEST PULMONARY EMBOLISM W CONTRAST   Final Result   1. Findings most likely represent multifocal pneumonia, right greater than   left as detailed above. Follow-up is recommended to document resolution. 2. Upper abdominal free fluid, the significance of which is unclear. 3. No evidence for pulmonary embolus. 4. Four-chamber cardiac enlargement/cardiomegaly. Coronary artery disease. 5. Subcarinal lymph node enlargement measuring up to 2.1 x 2.4 cm. Mildly   enlarged right hilar lymph node. 6. Cholelithiasis. 7. Postsurgical changes in association with the stomach. 8. Hypodense thyroid nodule measuring 2.5 x 1.3 cm. RECOMMENDATIONS:   2.5 cm incidental thyroid nodule. Recommend thyroid US. Reference: J Am Minda Radiol. 2015 Feb;12(2): 143-50         XR CHEST PORTABLE   Final Result   Multifocal infiltrate in the right lung and also likely in the left lung   base.   This may represent an inflammatory process or infection. Pulmonary Function test:    Polysomnogram:    Echocardiogram:   No results found for this or any previous visit. Cardiac Catheterization:   No results found for this or any previous visit. ASSESSMENT AND PLAN     Assessment:    // Acute hypoxemic respiratory failure likely secondary to aspiration  // Aspiration pneumonia right upper and lower lobe  // History of Gastric outlet obstruction as complication of bariatric banding.   // Subcarinal and hilar adenopathy  // COPD   // Tobacco abuse  // MARIAMA  //  HTN  // HLD  // CHF     Plan:    1. Initiate LABA/ICS  2. Continue Duoneb Q 4 times daily. 3. Would recommend Unasyn x 5-7 days then monitor off abx. Imaging of multifocal pneumonia could be result of previously treated pneumonia. 4. Patient will need repeat imaging as outpatient for resolution. 5. Follow up repeat CRP  6. Ordered BiPAP nightly. 7. As for the subcarinal lymphadenopathy, possibly related to repeated chronic aspiration. 8. On swallow study in 6/2020 patient had aspiration with thin liquids. However, repeat barium study shows no aspiration this admission. 9. GI planning on EGD with possible dilation today due to history of gastric outlet obstruction. 10. Follow up ANCA, due to history of hemoptysis. REED negative. I personally interviewed/examined the patient; reviewed interval history, interpreted all available radiographic and laboratory data at the time of service. Luke Ryder MD  Internal Medicine Resident, PGY-3  [address], [place]  10/15/2020, 7:02 AM       Attending Physician Statement    I have discussed the care of Atul Faster including pertinent history and exam findings with the resident. I have seen and examined the patient with the resident and the key elements of all parts of the encounter have been performed by me.   The orders were discussed and the medication list was reviewed with the resident and is up to date. I agree with the problem list, assessment and plan as documented by resident. Patient with history of admitted with aspiration pneumonia involving right upper and lower lobe, continue Unasyn, GI following, EGD today. Jerrod Astudillo M.D.   Pulmonary & Critical Care Medicine

## 2020-10-15 NOTE — PROGRESS NOTES
[facility]  Office: Dominik Joseph, DO, Giuliano Angel, DO, Martha Morley, DO, Eileen Craig Blood, DO, Rene Chavira MD, Jimbo Delarosa MD, Karthik Guadalupe MD, Sravanthi Carlson MD, Justice Rosenthal MD, Fadi Benton MD, Rachel Regalado MD, Linh Mojica MD, Marisela Tang MD, Bruno Martinez DO, Elvia Harper MD, Gerri Marino MD, Shawna Sanford DO, Fartun Hawkins MD,  Chandana Be DO, Yolande Soto MD, Caesar Franco MD, [facility], [facility], CNP, Barrera Breen, CNP, Pranav Peters, CNS, Noah Barthel, CNP, Carol Wallace, CNP, Alix Alvarez, CNP, Mary Slater, CNP, Elida Wright, CNP, Sophie Singh PA-C, [facility], [facility], Claudine Umaña, CNP, Sandhya Shannon, CNP, Ramses Ambrosio, CNP, Jim Kovacs, CNP, Keagan Walker, CNP         [facility]   [address] [facility]    Progress Note    10/15/2020    9:38 AM    Name:   Ronald Escamilla  MRN:     [id number]     Acct:      [de-identified]   Room:   [address] [place] [address] Day:  4  Admit Date:  10/11/2020  9:03 AM    PCP:   Nelson Montiel PA-C  Code Status:  Full Code    Subjective:     C/C:   Chief Complaint   Patient presents with    Shortness of Breath    Fever     Interval History Status: unchanged. Pt seen and evaluated this a.m. following endoscopy. Endoscopy findings detailed below. He has no new complaints. Saturating well on room air. Afebrile and hemodynamically stable. Labs reviewed. Brief History:     Bola Alarcon is a 70-year-old male who presented to the hospital complaining of shortness of breath and generalized weakness. Patient reported that he had been feeling unwell for approximately 1 week. He states that he had a uncontrollable coughing episode while his CPAP was on which subsequently led to vomiting and aspiration. Following that episode, he states that he developed a fever.   In the emergency department he underwent CT scan of the chest which suggested multifocal multiple pneumonia, right greater than left. He was started on Unasyn for presumed aspiration pneumonia. Cardiology was consulted regarding elevated troponin which appears to be chronic. Pt treated for aspiration pneumonia with 5 day course of unasyn. He has history of recurrent aspiration pneumonia. This is his second hospitalization for this issue within the past several months. Repeat swallow study here did not reveal any aspiration. GI was consulted regarding known esophageal stenosis which has previously required balloon dilatation. He will undergo EGD. Pulmonology consulted regarding episodes of hemoptysis and subcarinal lymph node enlargement, hilar lymph node enlargement. Thought to reactive. Repeat imaging will need to be repeated in the outpatient setting. Review of Systems:     Constitutional:  negative for chills, fevers, sweats  Respiratory:  + for cough, dyspnea on exertion, shortness of breath. Negative for wheezing  Cardiovascular:  negative for chest pain, chest pressure/discomfort, lower extremity edema, palpitations   Gastrointestinal:  negative for abdominal pain, constipation, diarrhea, nausea, vomiting  Neurological:  negative for dizziness, headache    Medications: Allergies:     Allergies   Allergen Reactions    Darvocet [Propoxyphene N-Acetaminophen] Hives    Other Hives    Oxycodone-Acetaminophen        Current Meds:   Scheduled Meds:    ferrous sulfate  325 mg Oral BID WC    vitamin C  500 mg Oral Daily    budesonide-formoterol  2 puff Inhalation BID    albuterol  2 mg Oral BID    bumetanide  1 mg Oral Daily    cetirizine  10 mg Oral Daily    fluticasone  2 spray Nasal Daily    gabapentin  300 mg Oral TID    multivitamin  1 tablet Oral Daily    metoprolol succinate  25 mg Oral BID    pantoprazole  40 mg Oral Daily    [Held by provider] potassium chloride  20 mEq Oral BID    [Held by provider] rivaroxaban  20 mg Oral Daily    rOPINIRole  0.25 mg Oral BID    [Held by provider] spironolactone  25 mg Oral Daily    sodium chloride flush  10 mL Intravenous 2 times per day    ipratropium-albuterol  1 ampule Inhalation Q4H WA    ampicillin-sulbactam  1.5 g Intravenous Q6H     Continuous Infusions:   PRN Meds: traMADol, acetaminophen, ipratropium-albuterol, sodium chloride flush, magnesium hydroxide, promethazine **OR** ondansetron, polyethylene glycol    Data:     Past Medical History:   has a past medical history of Acute superficial gastritis without hemorrhage, Anastomotic stricture of stomach, Asthma, Atrial fibrillation ([facility] Calculus of gallbladder without cholecystitis without obstruction, Chronic diastolic heart failure (HCC), Chronic rhinosinusitis, Class 2 severe obesity due to excess calories with serious comorbidity and body mass index (BMI) of 36.0 to 36.9 in [facility]), COPD (chronic obstructive pulmonary disease) ([facility] [address] E. [facility] septicemia ([facility] E. coli UTI, Foot drop, right, Fracture of femur ([facility] [facility] Gait disorder, Gastric out let obstruction, GERD (gastroesophageal reflux disease), Hallux valgus, acquired, bilateral, Hyperlipidemia, Hypertension, Impaired hearing, Internal hemorrhoids, Lymphedema of both lower extremities, Nausea & vomiting, OA (osteoarthritis) of knee, bilateral, Obesity, MARIAMA on CPAP, Osteoarthritis, Osteoarthritis of lumbar spine, S/P revision of total knee, Septicemia due to E. coli ([facility] [facility] Simple chronic bronchitis ([facility] [address] Slow transit constipation, Unspecified sleep apnea, and UTI (urinary tract infection). Social History:   reports that he quit smoking about 43 years ago. He has a 20.00 pack-year smoking history. He has never used smokeless tobacco. He reports that he does not drink alcohol or use drugs.      Family History:   Family History   Problem Relation Age of Onset   Hope Self Cancer Mother     Heart Attack Father        Vitals:  /76   Pulse 69   Temp 97.6 °F (36.4 °C) (Infrared)   Resp 19   Ht 5' 10\" (1.778 m)   Wt 216 lb 6.4 oz (98.2 kg)   SpO2 96%   BMI 31.05 kg/m²   Temp (24hrs), Av.8 °F (36.6 °C), Min:97.6 °F (36.4 °C), Max:98.1 °F (36.7 °C)    No results for input(s): POCGLU in the last 72 hours. I/O (24Hr): Intake/Output Summary (Last 24 hours) at 10/15/2020 0938  Last data filed at 10/15/2020 0853  Gross per 24 hour   Intake 735 ml   Output 850 ml   Net -115 ml       Labs:  Hematology:  Recent Labs     10/13/20  0927 10/13/20  1645 10/14/20  0917   WBC 5.7  --  5.0   RBC 3.07*  --  3.16*   HGB 9.6*  --  9.5*   HCT 31.9*  --  32.0*   .9*  --  101.3   MCH 31.3  --  30.1   MCHC 30.1  --  29.7   RDW 14.5*  --  13.8     --  144   MPV 10.0  --  9.1   CRP  --  94.2*  --      Chemistry:  Recent Labs     10/13/20  0927 10/14/20  0917    137   K 3.8 4.0   CL 99 99   CO2 28 25   GLUCOSE 113* 110*   BUN 12 12   CREATININE 0.66* 0.70   ANIONGAP 8* 13   LABGLOM >60 >60   GFRAA >60 >60   CALCIUM 8.6 8.9     No results for input(s): PROT, LABALBU, LABA1C, Y5EJTPC, H8NRWOQ, FT4, TSH, AST, ALT, LDH, GGT, ALKPHOS, LABGGT, BILITOT, BILIDIR, AMMONIA, AMYLASE, LIPASE, LACTATE, CHOL, HDL, LDLCHOLESTEROL, CHOLHDLRATIO, TRIG, VLDL, XBJ81UG, PHENYTOIN, PHENYF, URICACID, POCGLU in the last 72 hours. ABG:No results found for: POCPH, PHART, PH, POCPCO2, OKX1IWX, PCO2, POCPO2, PO2ART, PO2, POCHCO3, LNK3WMJ, HCO3, NBEA, PBEA, BEART, BE, THGBART, THB, VKT4CEI, HWTW8MMN, Y8FYRDFG, O2SAT, FIO2  Lab Results   Component Value Date/Time    SPECIAL NOT REPORTED 10/13/2020 12:05 PM     Lab Results   Component Value Date/Time    CULTURE NORMAL RESPIRATORY YORDY LIGHT GROWTH 10/13/2020 12:05 PM       Radiology:  Xr Chest Portable    Result Date: 10/11/2020  Multifocal infiltrate in the right lung and also likely in the left lung base. This may represent an inflammatory process or infection. Ct Chest Pulmonary Embolism W Contrast    Result Date: 10/11/2020  1.  Findings most likely represent multifocal pneumonia, right greater than left as detailed above. Follow-up is recommended to document resolution. 2. Upper abdominal free fluid, the significance of which is unclear. 3. No evidence for pulmonary embolus. 4. Four-chamber cardiac enlargement/cardiomegaly. Coronary artery disease. 5. Subcarinal lymph node enlargement measuring up to 2.1 x 2.4 cm. Mildly enlarged right hilar lymph node. 6. Cholelithiasis. 7. Postsurgical changes in association with the stomach. 8. Hypodense thyroid nodule measuring 2.5 x 1.3 cm. RECOMMENDATIONS: 2.5 cm incidental thyroid nodule. Recommend thyroid US. Reference: J Am Minda Radiol. 2015 Feb;12(2): 143-50     Procedure(s):  EGD SUBMUCOSAL/BOTOX INJECTION tattoo     Findings:  1. Normal esophagus. 2. Evidence of vertical banded gastroplasty with a narrow outlet measuring 10 mm in diameter. 3. Large amount of food bezoar in the gastric pouch interfering with visualization. 4. The mucosa appeared normal.  5. The adult endoscope was traversed through the gastric pouch outlet and entered into the remnant stomach. The antrum and pylorus appeared normal.  6. The retroflexed view of the excluded stomach appeared normal.  7. The staple line from prior gastroplasty was intact. This was tattooed using [place] ink.  8. The examined duodenum up to the second portion appeared normal.        Recommendations  1. Start pancreatic lipase enzymes 36,000 units 3 times daily with meals. 2. Start full liquid diet and encourage use of carbonated cola drinks. 3. We will need repeat upper GI in 1 to 2 days to assess the status of the gastric pouch and outlet  4. The options for correction include revision bariatric surgery versus Endo bariatrics. 5. Endo bariatrics option include creation of a GG fistula from the gastric pouch into the excluded stomach and placement of a fully covered metal stent. 6. We will discuss options with the patient.     Physical Examination:        General appearance:  alert, cooperative and no distress  Mental Status:  oriented to person, place and time and normal affect  Lungs: basilar crackles, faint. Heart:  regular rate and rhythm, no murmur  Abdomen:  soft, nontender, nondistended, normal bowel sounds, no masses, hepatomegaly, splenomegaly  Extremities:  no edema, redness, tenderness in the calves  Skin:  no gross lesions, rashes, induration    Assessment:        Hospital Problems           Last Modified POA    * (Principal) Pneumonia due to organism 10/11/2020 Yes    Atrial fibrillation ([facility] 10/11/2020 Yes    GERD (gastroesophageal reflux disease) 10/11/2020 Yes    Hypertension 10/11/2020 Yes    MARIAMA on CPAP 10/11/2020 Yes    COPD (chronic obstructive pulmonary disease) ([facility] 10/11/2020 Yes    Chronic diastolic heart failure ([facility] [facility] 10/11/2020 Yes    H/O gastric bypass 10/11/2020 Yes          Plan:        #Aspiration pneumonia/hemoptysis/Subcarinal lymph node enlargement measuring up to 2.1 x 2.4 cm. Mildly enlarged right hilar lymph node. - unasyn course completed today  - will need repeat imaging and follow-up with his pulmonologist, Dr. Kathie Burnham to further evaluate lymphadenopathy and hemoptysis  - pt passed swallow study without aspiration  - GI consulted and pt underwent EGD today, results detailed above. Recommendations as follows:  7. Start pancreatic lipase enzymes 36,000 units 3 times daily with meals. 8. Start full liquid diet and encourage use of carbonated cola drinks. 9. We will need repeat upper GI in 1 to 2 days to assess the status of the gastric pouch and outlet  10. The options for correction include revision bariatric surgery versus Endo bariatrics. 11. Endo bariatrics option include creation of a GG fistula from the gastric pouch into the excluded stomach and placement of a fully covered metal stent. 12. We will discuss options with the patient. #Atrial fibrillation/HFpEF/Troponin elevation  - consultation placed to cardiology, Dr. Shola Dias.  No further work-up

## 2020-10-16 PROBLEM — Z98.890 STATUS POST GASTROPLASTY: Status: ACTIVE | Noted: 2020-10-11

## 2020-10-16 PROBLEM — Z90.3 HISTORY OF GASTRECTOMY: Status: ACTIVE | Noted: 2020-10-11

## 2020-10-16 PROBLEM — J18.9 PNEUMONIA DUE TO ORGANISM: Status: RESOLVED | Noted: 2020-10-11 | Resolved: 2020-10-16

## 2020-10-16 PROCEDURE — 99232 SBSQ HOSP IP/OBS MODERATE 35: CPT | Performed by: INTERNAL MEDICINE

## 2020-10-16 PROCEDURE — 94640 AIRWAY INHALATION TREATMENT: CPT

## 2020-10-16 PROCEDURE — 6370000000 HC RX 637 (ALT 250 FOR IP): Performed by: INTERNAL MEDICINE

## 2020-10-16 PROCEDURE — 97116 GAIT TRAINING THERAPY: CPT

## 2020-10-16 PROCEDURE — 97110 THERAPEUTIC EXERCISES: CPT

## 2020-10-16 PROCEDURE — 97530 THERAPEUTIC ACTIVITIES: CPT

## 2020-10-16 PROCEDURE — 2580000003 HC RX 258: Performed by: INTERNAL MEDICINE

## 2020-10-16 PROCEDURE — 94761 N-INVAS EAR/PLS OXIMETRY MLT: CPT

## 2020-10-16 PROCEDURE — APPSS30 APP SPLIT SHARED TIME 16-30 MINUTES: Performed by: PHYSICIAN ASSISTANT

## 2020-10-16 PROCEDURE — 1200000000 HC SEMI PRIVATE

## 2020-10-16 RX ORDER — ASCORBIC ACID 500 MG
500 TABLET ORAL DAILY
Qty: 30 TABLET | Refills: 3 | Status: SHIPPED | OUTPATIENT
Start: 2020-10-17 | End: 2020-10-26 | Stop reason: SDUPTHER

## 2020-10-16 RX ORDER — LANOLIN ALCOHOL/MO/W.PET/CERES
325 CREAM (GRAM) TOPICAL 2 TIMES DAILY WITH MEALS
Qty: 90 TABLET | Refills: 3 | Status: SHIPPED | OUTPATIENT
Start: 2020-10-16

## 2020-10-16 RX ADMIN — METOPROLOL SUCCINATE 25 MG: 25 TABLET, FILM COATED, EXTENDED RELEASE ORAL at 21:35

## 2020-10-16 RX ADMIN — GABAPENTIN 300 MG: 300 CAPSULE ORAL at 21:35

## 2020-10-16 RX ADMIN — PANCRELIPASE 36000 UNITS: 24000; 76000; 120000 CAPSULE, DELAYED RELEASE PELLETS ORAL at 17:16

## 2020-10-16 RX ADMIN — FERROUS SULFATE TAB EC 325 MG (65 MG FE EQUIVALENT) 325 MG: 325 (65 FE) TABLET DELAYED RESPONSE at 09:51

## 2020-10-16 RX ADMIN — FLUTICASONE PROPIONATE 2 SPRAY: 50 SPRAY, METERED NASAL at 09:52

## 2020-10-16 RX ADMIN — BUMETANIDE 1 MG: 1 TABLET ORAL at 09:52

## 2020-10-16 RX ADMIN — IPRATROPIUM BROMIDE AND ALBUTEROL SULFATE 1 AMPULE: .5; 3 SOLUTION RESPIRATORY (INHALATION) at 16:06

## 2020-10-16 RX ADMIN — IPRATROPIUM BROMIDE AND ALBUTEROL SULFATE 1 AMPULE: .5; 3 SOLUTION RESPIRATORY (INHALATION) at 20:40

## 2020-10-16 RX ADMIN — ROPINIROLE HYDROCHLORIDE 0.25 MG: 0.25 TABLET, FILM COATED ORAL at 21:35

## 2020-10-16 RX ADMIN — ROPINIROLE HYDROCHLORIDE 0.25 MG: 0.25 TABLET, FILM COATED ORAL at 09:52

## 2020-10-16 RX ADMIN — BUDESONIDE AND FORMOTEROL FUMARATE DIHYDRATE 2 PUFF: 160; 4.5 AEROSOL RESPIRATORY (INHALATION) at 20:40

## 2020-10-16 RX ADMIN — THERA TABS 1 TABLET: TAB at 09:52

## 2020-10-16 RX ADMIN — GABAPENTIN 300 MG: 300 CAPSULE ORAL at 15:16

## 2020-10-16 RX ADMIN — Medication 10 ML: at 21:35

## 2020-10-16 RX ADMIN — FERROUS SULFATE TAB EC 325 MG (65 MG FE EQUIVALENT) 325 MG: 325 (65 FE) TABLET DELAYED RESPONSE at 17:16

## 2020-10-16 RX ADMIN — IPRATROPIUM BROMIDE AND ALBUTEROL SULFATE 1 AMPULE: .5; 3 SOLUTION RESPIRATORY (INHALATION) at 08:58

## 2020-10-16 RX ADMIN — GABAPENTIN 300 MG: 300 CAPSULE ORAL at 09:51

## 2020-10-16 RX ADMIN — PANCRELIPASE 36000 UNITS: 24000; 76000; 120000 CAPSULE, DELAYED RELEASE PELLETS ORAL at 13:15

## 2020-10-16 RX ADMIN — CETIRIZINE HYDROCHLORIDE 10 MG: 10 TABLET ORAL at 09:52

## 2020-10-16 RX ADMIN — PANCRELIPASE 36000 UNITS: 24000; 76000; 120000 CAPSULE, DELAYED RELEASE PELLETS ORAL at 09:55

## 2020-10-16 RX ADMIN — TRAMADOL HYDROCHLORIDE 50 MG: 50 TABLET, COATED ORAL at 09:55

## 2020-10-16 RX ADMIN — IPRATROPIUM BROMIDE AND ALBUTEROL SULFATE 1 AMPULE: .5; 3 SOLUTION RESPIRATORY (INHALATION) at 12:12

## 2020-10-16 RX ADMIN — OXYCODONE HYDROCHLORIDE AND ACETAMINOPHEN 500 MG: 500 TABLET ORAL at 09:52

## 2020-10-16 RX ADMIN — BUDESONIDE AND FORMOTEROL FUMARATE DIHYDRATE 2 PUFF: 160; 4.5 AEROSOL RESPIRATORY (INHALATION) at 08:58

## 2020-10-16 RX ADMIN — ALBUTEROL 2 MG: 2 TABLET ORAL at 09:56

## 2020-10-16 RX ADMIN — PANTOPRAZOLE SODIUM 40 MG: 40 TABLET, DELAYED RELEASE ORAL at 09:52

## 2020-10-16 ASSESSMENT — PAIN DESCRIPTION - PROGRESSION

## 2020-10-16 ASSESSMENT — PAIN DESCRIPTION - PAIN TYPE
TYPE: CHRONIC PAIN
TYPE: ACUTE PAIN

## 2020-10-16 ASSESSMENT — PAIN DESCRIPTION - ORIENTATION: ORIENTATION: POSTERIOR;LOWER

## 2020-10-16 ASSESSMENT — PAIN DESCRIPTION - DIRECTION: RADIATING_TOWARDS: POSTERIOR NECK

## 2020-10-16 ASSESSMENT — PAIN DESCRIPTION - ONSET: ONSET: ON-GOING

## 2020-10-16 ASSESSMENT — PAIN DESCRIPTION - FREQUENCY: FREQUENCY: INTERMITTENT

## 2020-10-16 ASSESSMENT — PAIN SCALES - GENERAL
PAINLEVEL_OUTOF10: 6

## 2020-10-16 ASSESSMENT — PAIN DESCRIPTION - LOCATION
LOCATION: HEAD;KNEE
LOCATION: HEAD;NECK

## 2020-10-16 ASSESSMENT — PAIN DESCRIPTION - DESCRIPTORS: DESCRIPTORS: ACHING

## 2020-10-16 NOTE — PROGRESS NOTES
Physical Therapy  Facility/Department: [facility] ONC/MED SURG  Daily Treatment Note  NAME: Atul Flood  : 1945  MRN: [id number]    Date of Service: 10/16/2020    Discharge Recommendations:  Patient would benefit from continued therapy after discharge   PT Equipment Recommendations  Equipment Needed: No    Assessment   Body structures, Functions, Activity limitations: Decreased functional mobility ; Decreased strength;Decreased balance  Assessment: Pt completing functional mobility without physical assistance. Displays mild balance deficits and decreased endurance. Prognosis: Good  PT Education: Home Exercise Program;Gait Training;General Safety  REQUIRES PT FOLLOW UP: Yes  Activity Tolerance  Activity Tolerance: Patient Tolerated treatment well     Patient Diagnosis(es): The encounter diagnosis was Pneumonia due to organism. has a past medical history of Acute superficial gastritis without hemorrhage, Anastomotic stricture of stomach, Asthma, Atrial fibrillation ([address] Calculus of gallbladder without cholecystitis without obstruction, Chronic diastolic heart failure (HCC), Chronic rhinosinusitis, Class 2 severe obesity due to excess calories with serious comorbidity and body mass index (BMI) of 36.0 to 36.9 in adult [facility]), COPD (chronic obstructive pulmonary disease) ([address] E. coli septicemia ([address] E. coli UTI, Foot drop, right, Fracture of femur ([address] Gait disorder, Gastric out let obstruction, GERD (gastroesophageal reflux disease), Hallux valgus, acquired, bilateral, Hyperlipidemia, Hypertension, Impaired hearing, Internal hemorrhoids, Lymphedema of both lower extremities, Nausea & vomiting, OA (osteoarthritis) of knee, bilateral, Obesity, MARIAMA on CPAP, Osteoarthritis, Osteoarthritis of lumbar spine, S/P revision of total knee, Septicemia due to E. coli ([address] Simple chronic bronchitis ([address] Slow transit constipation, Unspecified sleep apnea, and UTI (urinary tract infection).    has a past surgical assistance  Ambulation  Ambulation?: Yes  Ambulation 1  Surface: level tile  Device: Rolling Walker  Other Apparatus: AFO  Assistance: Contact guard assistance  Quality of Gait: decreased pace, unsteady but no LOB, narrow JACOB  Distance: 120ft     Balance  Posture: Fair(forward head)  Sitting - Static: Good  Sitting - Dynamic: Fair;+, required Anselmo to don B AFOs prior to ambulation  Standing - Static: (pt stood at [address] [place] [address] for 1-2 minutes; required Anselmo to don brief)  Standing - Dynamic: Fair;-      Exercises:  Seated LE exercise program: Long Arc Quads, hip abduction/adduction, and marches. Reps: 15x each AROM  Goals  Short term goals  Time Frame for Short term goals: 10 visits  Short term goal 1: transfers with SBA  Short term goal 2: amb 150 ft with a RW x SBA  Short term goal 3: ascend/descend 4 steps with SBA  Short term goal 4: exercise program x SBA  Patient Goals   Patient goals : Return home    Plan    Plan  Times per week: 5-6x wk  Current Treatment Recommendations: Strengthening, Gait Training, Stair training, Functional Mobility Training, Endurance Training, Safety Education & Training, Home Exercise Program  Safety Devices  Type of devices:  All fall risk precautions in place, Patient at risk for falls, Call light within reach, Chair alarm in place, Gait belt, Nurse notified, Left in chair  Restraints  Initially in place: No     Therapy Time   Individual Concurrent Group Co-treatment   Time In 0810         Time Out 0855         Minutes 45         Timed Code Treatment Minutes: [facility]

## 2020-10-16 NOTE — PROGRESS NOTES
odynophagia, hematemesis. Patient presented with a low-grade fever, nontachycardic, and hemodynamically stable. Room air saturating well. Initial BMP unremarkable. proBNP elevated at 4000. Troponin 50. Hepatic panel negative. BC demonstrated normocytic anemia. No leukocytosis. COVID-19 negative. Respiratory cultures negative thus far. Pneumonia antigen negative. Chest x-ray on 10/11/2020 demonstrated multifocal infiltrate in the right lung as well as left lung base. CT chest on 10/11 showed multifocal pneumonia right greater than left. No evidence of pulmonary embolism. Cardiomegaly noted. There was subcarinal lymph node enlargement measuring 2.1 x 2.4 cm. Mildly enlarged right hilar lymph node. Of note patient has a history of prior gastric banding in 7786 with complications of gastric outlet obstruction requiring intermittent dilations. EGD in 10/2018 with dilation to 15 mm. Patient had another EGD 11/2018 with banding. Patient has also been treated for multiple pneumonias. In 6/2020 patient was treated for bradycardia and chf exacerbation at [facility]. Patient was also treated with vancomycin /zosyn x 7 days for possible aspiration pneumonia. Patient was then monitored off abx. Admitted again at [address] [place] [address] and treated for MDR enterobacter from the sputum that grew from the facility at [facility] with meropenem. Patient discharged on meropenem  Until 7/7/2020.        PAST MEDICAL HISTORY:        Diagnosis Date    Acute superficial gastritis without hemorrhage 5/26/2018    Anastomotic stricture of stomach     Asthma     Atrial fibrillation ([address]     On Xarelto 6/27/2020    Calculus of gallbladder without cholecystitis without obstruction 5/2/2017    Chronic diastolic heart failure ([address] 11/17/2017    Chronic rhinosinusitis 4/13/2015    Class 2 severe obesity due to excess calories with serious comorbidity and body mass index (BMI) of 36.0 to 36.9 in adult [facility]) 11/17/2017    COPD (chronic obstructive pulmonary disease) [facility]     E. coli septicemia ([facility]     E. coli UTI     Foot drop, right 7/10/2012    Fracture of femur ([facility] 10/23/2016    Gait disorder 7/20/2016    Gastric out let obstruction     GERD (gastroesophageal reflux disease)     Hallux valgus, acquired, bilateral 6/24/2015    Hyperlipidemia     Hypertension     Impaired hearing 4/13/2015    Internal hemorrhoids 1/3/2017    Lymphedema of both lower extremities 6/24/2015    Nausea & vomiting 11/16/2018    OA (osteoarthritis) of knee, bilateral 12/11/2006    Obesity     MARIAMA on CPAP 5/2/2017    Osteoarthritis     Osteoarthritis of lumbar spine 12/13/2007     replace inactive diagnosis    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 coli (HCC)     Due to UTI     Simple chronic bronchitis ([facility] 4/10/2018    Slow transit constipation 11/3/2016    Unspecified sleep apnea     UTI (urinary tract infection)      PAST SURGICAL HISTORY:        Procedure Laterality Date    CARPAL TUNNEL RELEASE      bilateral    COLONOSCOPY      CYSTOSCOPY  01/02/2019    by Dr. Niraj Ruiz N/A 1/2/2019    CYSTOSCOPY performed by Natalie Fonseca MD at [address]    first knuckle right index finger   [address]    vertical banded gastroplasty   [address] REPL[address]EMENT  2004 & 2005    bilateral knees    [place] EGD [address] N/A 8/16/2018    EGD FOREIGN BODY REMOVAL performed by Pako Germain MD at [address] [facility] EGD TRANSORAL BIOPSY SINGLE/MULTIPLE N/A 5/25/2018    EGD BIOPSY performed by Katarzyna Marie DO at [address] [facility]      left shoulder    UPPER GASTROINTESTINAL ENDOSCOPY  08/13/2018    removal of food bolus    UPPER GASTROINTESTINAL ENDOSCOPY N/A 8/13/2018    EGD FOREIGN BODY REMOVAL performed by Katarzyna Marie DO at [address] [facility] 8/13/2018    EGD BIOPSY performed by Nirmal Franklin DO at [address] [place] [address]  08/16/2018    egd with balloon dilitation    UPPER GASTROINTESTINAL ENDOSCOPY  8/16/2018    EGD DILATION BALLOON performed by Madisyn Jones MD at [address] [place] [address] 10/1/2018    EGD BIOPSY performed by Heather Ireland MD at [address] [place] [address]  10/1/2018    EGD DILATION BALLOON performed by Heather Ireland MD at [address] [place] [address] N/A 11/19/2018    EGD DILATION BALLOON performed by Heather Ireland MD at [address] [place] [address] N/A 10/15/2020    EGD SUBMUCOSAL/BOTOX INJECTION tattoo  performed by Estephania De La Cruz MD at [facility]     FAMILY HISTORY:       Problem Relation Age of Onset    Cancer Mother     Heart Attack Father      SOCIAL HISTORY:   TOBACCO:   reports that he quit smoking about 43 years ago. He has a 20.00 pack-year smoking history. He has never used smokeless tobacco.  ETOH:  reports no history of alcohol use. DRUGS: reports no history of drug use. ALLERGIES:    Allergies   Allergen Reactions    Darvocet [Propoxyphene N-Acetaminophen] Hives    Other Hives    Oxycodone-Acetaminophen          HOME MEDICATIONS:  Prior to Admission medications    Medication Sig Start Date End Date Taking? Authorizing Provider   bumetanide (BUMEX) 1 MG tablet TAKE 2 TABLETS BY MOUTH ON MONDAY, WEDNESDAY AND FRIDAY.  TAKE 1 TABLET BY MOUTH ON ALL OTHER DAYS 9/17/20  Yes Adin Bernardo PA-C   rOPINIRole (REQUIP) 0.25 MG tablet TAKE 1 TABLET BY MOUTH TWICE DAILY 8/20/20  Yes DARIANA De JesusC   gabapentin (NEURONTIN) 300 MG capsule TAKE 1 CAPSULE BY MOUTH THREE TIMES DAILY 9/18/20 11/17/20 Yes BOZENA De Jesus-C   rivaroxaban (XARELTO) 20 MG TABS tablet Take 1 tablet by mouth daily 7/16/20  Yes DARIANA De JesusC   tiZANidine (ZANAFLEX) 4 MG tablet TAKE 1 TABLET BY MOUTH DAILY AS NEEDED 10/15/20   Elsi Cantu PA-C   vitamin B-12 (CYANOCOBALAMIN) 100 MCG tablet TAKE 1 TABLET BY MOUTH DAILY AS NEEDED FOR FATIGUE 7/23/20   Elsi Cantu PA-C   spironolactone (ALDACTONE) 25 MG tablet Take 1 tablet by mouth daily 7/16/20   Elsi Cantu PA-C   metoprolol succinate (TOPROL XL) 25 MG extended release tablet Take 1 tablet by mouth 2 times daily 7/16/20   Elsi Cantu PA-C   pantoprazole (PROTONIX) 40 MG tablet Take 1 tablet by mouth daily 7/16/20   Elsi Cantu PA-C   potassium chloride (KLOR-CON M) 20 MEQ extended release tablet Take 1 tablet by mouth 2 times daily 7/16/20   Elsi Cantu PA-C   Multiple Vitamin (MULTI-VITAMINS) TABS Take 1 tablet by mouth daily 7/16/20   Elsi Cantu PA-C   cetirizine (ZYRTEC) 10 MG tablet TAKE 1 TABLET BY MOUTH DAILY 5/27/20   Elsi Cantu PA-C   fluticasone [facility]) 50 MCG/ACT nasal spray 2 sprays by Nasal route daily 3/5/20   Elsi Cantu PA-C   guaiFENesin (SM MUCUS RELIEF) 600 MG extended release tablet Take 1 tablet by mouth 2 times daily 3/5/20   Elsi Cantu PA-C   D-Mannose 500 MG CAPS Take 500 mg by mouth 3 times daily 11/11/19 12/11/19  Itzel Leavitt MD    LUBRICANT EYE DROPS 0.4-0.3 % ophthalmic solution PLACE 1 DROP INTO BOTH EYES FOUR TIMES DAILY AS NEEDED FOR DRY EYES 10/17/19   Elsi Cantu PA-C   Fluticasone furoate-vilanterol (BREO ELLIPTA) 200-25 MCG/INH AEPB inhaler Inhale 1 puff into the lungs daily    Historical Provider, MD   Handicap Placard MISC by Does not apply route 6/27/19   Elsi Cantu PA-C   Incontinence Supply Disposable (INCONTINENCE BRIEF LARGE) MISC To use as directed.  Use 3x a day 4/30/19   Elsi Cantu PA-C   polyethylene glycol [facility]) powder Take 17 g by mouth daily 3/5/19   Elsi Cantu PA-C   Foot Care Products (TRI-BALANCE ORTHOTICS MENS) [address] [facility] Provide insurance covered orthotics shoes, wear daily 12/12/18 Rosa Trevino PA-C   albuterol (PROVENTIL) 2 MG tablet Use as needed 10/4/18   Raj Glass MD   ipratropium-albuterol (DUONEB) 0.5-2.5 (3) MG/3ML SOLN nebulizer solution Inhale 1 vial into the lungs every 4 hours as needed     Historical Provider, MD   Armodafinil 200 MG TABS Take 1 tablet by mouth 2 times daily. 18   Historical Provider, MD     IMMUNIZATIONS:  Most Recent Immunizations   Administered Date(s) Administered    Influenza Vaccine, unspecified formulation 2015    Influenza Virus Vaccine 2015    Influenza, Quadv, IM, PF (6 mo and older Fluzone, Flulaval, Fluarix, and 3 yrs and older Afluria) 2019    Pneumococcal Conjugate 13-valent ([id number]) 2018    Pneumococcal Polysaccharide ([id number]) 10/31/2019    Tdap (Boostrix, Adacel) 2019       REVIEW OF SYSTEMS:  Unobtainable from patient due to sedation/mechanical ventilation    PHYSICAL EXAMINATION     VITAL SIGNS:   LAST-  /65   Pulse 58   Temp 97.3 °F (36.3 °C) (Axillary)   Resp 13   Ht 5' 10\" (1.778 m)   Wt 220 lb 1.6 oz (99.8 kg)   SpO2 100%   BMI 31.58 kg/m²   8-24 HR RANGE-  TEMP Temp  Av.9 °F (36.6 °C)  Min: 97.3 °F (36.3 °C)  Max: 98.2 °F (71.1 °C)   BP Systolic (09JAZ), AMD:774 , Min:104 , BRT:099      Diastolic (18WYH), AAW:13, Min:65, Max:89     PULSE Pulse  Av.4  Min: 58  Max: 88   RR Resp  Av  Min: 13  Max: 13   O2 SAT SpO2  Av %  Min: 100 %  Max: 100 %   OXYGEN DELIVERY No data recorded     SYSTEMIC EXAMINATION:   General appearance - cooperative. Mental status - A&O x 3   Eyes - pupils equal and reactive, sclera anicteric  Mouth - mucous membranes moist, pharynx normal without lesions  Neck - supple, no significant adenopathy, carotids upstroke normal bilaterally, no bruits  Chest - Course breath sounds on the right lung field. Diminished breath sounds.    Heart - normal rate, regular rhythm, normal S1, S2, no murmurs, rubs, clicks or gallops  Abdomen - soft, nontender, nondistended, no masses or organomegaly  Neurological - DTR's normal and symmetric, motor and sensory grossly normal bilaterally  Extremities - peripheral pulses normal, no pedal edema, no clubbing or cyanosis  Skin - normal coloration and turgor, no rashes, no suspicious skin lesions noted     DATA REVIEW     Medications: Current Inpatient  Scheduled Meds:   ferrous sulfate  325 mg Oral BID WC    vitamin C  500 mg Oral Daily    lipase-protease-amylase  36,000 Units Oral TID WC    budesonide-formoterol  2 puff Inhalation BID    albuterol  2 mg Oral BID    bumetanide  1 mg Oral Daily    cetirizine  10 mg Oral Daily    fluticasone  2 spray Nasal Daily    gabapentin  300 mg Oral TID    multivitamin  1 tablet Oral Daily    metoprolol succinate  25 mg Oral BID    pantoprazole  40 mg Oral Daily    [Held by provider] potassium chloride  20 mEq Oral BID    [Held by provider] rivaroxaban  20 mg Oral Daily    rOPINIRole  0.25 mg Oral BID    [Held by provider] spironolactone  25 mg Oral Daily    sodium chloride flush  10 mL Intravenous 2 times per day    ipratropium-albuterol  1 ampule Inhalation Q4H WA     Continuous Infusions:   sodium chloride 30 mL/hr at 10/15/20 1417     INPUT/OUTPUT:  In: 4234 [P.O.:720; I.V.:596]  Out: 2350 [Urine:2350]  Date 10/16/20 0000 - 10/16/20 2359   Shift 1725-0549 3897-5624 8205-5357 24 Hour Total   INTAKE   Shift Total(mL/kg)       OUTPUT   Urine(mL/kg/hr) 700(0.9)   700   Shift Total(mL/kg) 700(7)   700(7)   Weight (kg) 99.8 99.8 99.8 99.8       LABS:-  ABG:   No results for input(s): POCPH, POCPCO2, POCPO2, POCHCO3, KOAM6PQK in the last 72 hours.   CBC:   Recent Labs     10/13/20  0927 10/14/20  0917   WBC 5.7 5.0   HGB 9.6* 9.5*   HCT 31.9* 32.0*   .9* 101.3    144   LYMPHOPCT 16* 15*   RBC 3.07* 3.16*   MCH 31.3 30.1   MCHC 30.1 29.7   RDW 14.5* 13.8     BMP:   Recent Labs     10/13/20  0927 10/14/20  0917    137   K 3.8 4.0   CL 99 99   CO2 28 25   BUN 12 12   CREATININE 0.66* 0.70   GLUCOSE 113* 110*     Liver Function Test:   No results for input(s): PROT, LABALBU, ALT, AST, GGT, ALKPHOS, BILITOT in the last 72 hours. Amylase/Lipase:  No results for input(s): AMYLASE, LIPASE in the last 72 hours. Coagulation Profile:   No results for input(s): INR, PROTIME, APTT in the last 72 hours. Cardiac Enzymes:  No results for input(s): CKTOTAL, CKMB, CKMBINDEX, TROPONINI in the last 72 hours. Lactic Acid:  Lab Results   Component Value Date    LACTA 1.4 05/24/2018     BNP:   No results found for: BNP  D-Dimer:  Lab Results   Component Value Date    DDIMER 0.74 10/11/2020     Others:   Lab Results   Component Value Date    TSH 2.74 01/04/2016    H7ZVQAR 3.9 (L) 01/04/2016     Lab Results   Component Value Date    REED NEGATIVE 10/13/2020    CRP 54.8 (H) 10/15/2020     No results found for: Hilda Mcdonald  Lab Results   Component Value Date    IRON 23 (L) 10/14/2020    TIBC 260 10/14/2020    FERRITIN 28 (L) 10/11/2020     No results found for: SPEP, UPEP  Lab Results   Component Value Date    PSA 0.30 10/30/2019     Microbiology:    Pathology:    Radiology Reports:  FL MODIFIED BARIUM SWALLOW W VIDEO   Final Result   No laryngeal penetration or tracheal aspiration of all tested consistencies. Premature vallecular spillage of all consistencies. Please see separate speech pathology report for full discussion of findings   and recommendations. CT CHEST PULMONARY EMBOLISM W CONTRAST   Final Result   1. Findings most likely represent multifocal pneumonia, right greater than   left as detailed above. Follow-up is recommended to document resolution. 2. Upper abdominal free fluid, the significance of which is unclear. 3. No evidence for pulmonary embolus. 4. Four-chamber cardiac enlargement/cardiomegaly. Coronary artery disease. 5. Subcarinal lymph node enlargement measuring up to 2.1 x 2.4 cm. Mildly   enlarged right hilar lymph node. personally interviewed/examined the patient; reviewed interval history, interpreted all available radiographic and laboratory data at the time of service. Donnell Thomason MD  Internal Medicine Resident, PGY-3  [facility]; [place], [place]  10/16/2020, 8:23 AM   Attending Physician Statement  I have discussed the care of Omi Kaur, including pertinent history and exam findings,  with the resident. I have seen and examined the patient and the key elements of all parts of the encounter have been performed by me. I agree with the assessment, plan and orders as documented by the resident with additions . Treatment plan Discussed with nursing staff in detail , all questions answered . Electronically signed by Italia Parsons MD on   10/16/20 at 7:34 PM EDT    Please note that this chart was generated using voice recognition Dragon dictation software. Although every effort was made to ensure the accuracy of this automated transcription, some errors in transcription may have occurred.

## 2020-10-16 NOTE — PLAN OF CARE
Problem: Falls - Risk of:  Goal: Will remain free from falls  Description: Will remain free from falls  10/16/2020 1728 by Katelin Dumont RN  Outcome: Ongoing  10/16/2020 0425 by Michael Zendejas RN  Outcome: Ongoing  Note: No falls to date. Bed in lowest position with call light within reach. Floor free from obstacles and pt verbalizes understanding to call out with needs or assistance with ambulation. Falls risk score evaluated-high risk. Bed alarm activated and falling star posted. Will continue to monitor additional needs. Goal: Absence of physical injury  Description: Absence of physical injury  10/16/2020 1728 by Katelin Dumont RN  Outcome: Ongoing  10/16/2020 0425 by Michael Zendejas RN  Outcome: Ongoing     Problem: Skin Integrity:  Goal: Will show no infection signs and symptoms  Description: Will show no infection signs and symptoms  10/16/2020 1728 by Katelin Dumont RN  Outcome: Ongoing  10/16/2020 0425 by Michael Zendejas RN  Outcome: Ongoing  Goal: Absence of new skin breakdown  Description: Absence of new skin breakdown  Outcome: Ongoing     Problem: Musculor/Skeletal Functional Status  Goal: Highest potential functional level  Outcome: Ongoing     Problem: Pain:  Goal: Pain level will decrease  Description: Pain level will decrease  10/16/2020 1728 by Katelin Dumont RN  Outcome: Ongoing  10/16/2020 0425 by Michael Zendejas RN  Outcome: Ongoing  Goal: Control of acute pain  Description: Control of acute pain  10/16/2020 1728 by Katelin Dumont RN  Outcome: Ongoing  10/16/2020 0425 by Michael Zendejas RN  Outcome: Ongoing  Note: Pain level assessment complete. Pt rated headache pain on 0-10 scale. Pt educated on pain scale and control interventions. PRN pain medication given per pt request. Pt verbalizes understanding to call out with new onset of pain or unrelieved pain. Will continue to monitor.     Goal: Control of chronic pain  Description: Control of chronic pain  Outcome: Ongoing

## 2020-10-16 NOTE — DISCHARGE SUMMARY
[facility]  Office: [address], DO, Eneida Navarro, DO, Marcella Dacosta, DO, Michael Mckeon Blood, DO, Kateryna Weiss MD, Cecilia Salas MD, Morteza Rodriguez MD, Juany Smith MD, Nicole Jones MD, Cammie Whitt MD, Cherie Hussein MD, Napoleon Bhandari MD, Mbonu Dusty Babinski, MD, Nidia Pedraza DO, María Ryan MD, Patricia Mullins MD, Catina Liao DO, Nolvia Tejeda MD,  Charles Wright DO, Paul Coffey MD, Lesley Mclaughlin MD, Manjula Escamilla, Karon Camacho, CNP, Monika Cruz, CNP, Sumeet Harmon, CNS, Moy Alvarado, CNP, Darrion Austin, [facility], Melissa Mcconnell, CNP, Scott Szymanski, CNP, Roel Dickens, CNP, Gaurang Cooper PA-C, Erin Solis, [facility], Josh Mcgill, CNP, Pete Gutiérrez, CNP, Kirstie Raygoza, CNP, Luca Garcia, CNP, Feli Sahni, [facility]    Discharge Summary     Patient ID: Xavier Menjivar  :  1945   MRN: [id number]     ACCOUNT:  [de-identified]   Patient's PCP: Merna Mckay PA-C  Admit Date: 10/11/2020   Discharge Date: 10/17/2020  Length of Stay: 6  Code Status:  Full Code  Admitting Physician: Adam Staples DO  Discharge Physician: Adam Staples DO     Active Discharge Diagnoses:     Hospital Problem Lists:  Principal Problem (Resolved):    Pneumonia due to organism  Active Problems:    Atrial fibrillation ([facility] [address]    GERD (gastroesophageal reflux disease)    Hypertension    MARIAMA on CPAP    COPD (chronic obstructive pulmonary disease) ([facility] [address]    Chronic diastolic heart failure ([facility] [address]    Status post gastroplasty      Admission Condition:  poor     Discharged Condition: good    Hospital Stay:     Hospital Course:      Doni Antunez is a 60-year-old male who presented to the hospital complaining of shortness of breath and generalized weakness. Patient reported that he had been feeling unwell for approximately 1 week.   He states that he had a uncontrollable coughing episode while his CPAP was on which subsequently led to vomiting and aspiration. Following that episode, he states that he developed a fever. In the emergency department he underwent CT scan of the chest which suggested multifocal multiple pneumonia, right greater than left. He was started on Unasyn for presumed aspiration pneumonia. Cardiology was consulted regarding elevated troponin which appears to be chronic.     Pt treated for aspiration pneumonia with 5 day course of unasyn. He has history of recurrent aspiration pneumonia. This is his second hospitalization for this issue within the past several months. Repeat swallow study here did not reveal any aspiration. GI was consulted regarding known esophageal stenosis which has previously required balloon dilatation. He underwent EGD which showed a normal esophagus with evidence of vertical banded gastroplasty with a narrow outlet measuring 10 mm in diameter also showed a large amount of food bezoar in the gastric pouch interfering with visualization. There is a staple line from prior gastroplasty was intact. Pulmonology consulted regarding episodes of hemoptysis and subcarinal lymph node enlargement, hilar lymph node enlargement. Thought to reactive. Repeat imaging will need to be repeated in the outpatient setting. 10/15: Pt underwent EGD which revealed the following:    Procedure(s):  EGD SUBMUCOSAL/BOTOX INJECTION tattoo      Findings:  1. Normal esophagus. 2. Evidence of vertical banded gastroplasty with a narrow outlet measuring 10 mm in diameter. 3. Large amount of food bezoar in the gastric pouch interfering with visualization.   4. The mucosa appeared normal.  5. The adult endoscope was traversed through the gastric pouch outlet and entered into the remnant stomach.  The antrum and pylorus appeared normal.  6. The retroflexed view of the excluded stomach appeared normal.  7. The staple line from prior gastroplasty was intact.  This was tattooed using [facility] ink.  8. The examined duodenum up to the second portion appeared normal.        Recommendations  1. Start pancreatic lipase enzymes 36,000 units 3 times daily with meals. 2. Start full liquid diet and encourage use of carbonated cola drinks. 3. We will need repeat upper GI in 1 to 2 days to assess the status of the gastric pouch and outlet  4. The options for correction include revision bariatric surgery versus Endo bariatrics. 5. Endo bariatrics option include creation of a GG fistula from the gastric pouch into the excluded stomach and placement of a fully covered metal stent. 6. We will discuss options with the patient. He is not a surgical candidate. He was deemed appropriate for discharge home. I recommend that he adhere to a soft/pureed diet to facilitate better digestion and reduce risk of aspiration/regurgitation given narrow gastric outlet. He will follow-up with his surgeon, Dr. Sean Kim as an outpatient.     Significant Diagnostic Studies:   Labs / Micro:  CBC:   Lab Results   Component Value Date    WBC 5.0 10/14/2020    RBC 3.16 10/14/2020    RBC 4.39 09/13/2011    HGB 9.5 10/14/2020    HCT 32.0 10/14/2020    .3 10/14/2020    MCH 30.1 10/14/2020    MCHC 29.7 10/14/2020    RDW 13.8 10/14/2020     10/14/2020     09/13/2011     BMP:    Lab Results   Component Value Date    GLUCOSE 110 10/14/2020    GLUCOSE 99 09/13/2011     10/14/2020    K 4.0 10/14/2020    CL 99 10/14/2020    CO2 25 10/14/2020    ANIONGAP 13 10/14/2020    BUN 12 10/14/2020    CREATININE 0.70 10/14/2020    BUNCRER NOT REPORTED 10/14/2020    CALCIUM 8.9 10/14/2020    LABGLOM >60 10/14/2020    GFRAA >60 10/14/2020    GFR      10/14/2020    GFR NOT REPORTED 10/14/2020     HFP:    Lab Results   Component Value Date    PROT 7.3 10/11/2020     CMP:    Lab Results   Component Value Date    GLUCOSE 110 10/14/2020    GLUCOSE 99 09/13/2011     10/14/2020    K 4.0 10/14/2020    CL 99 10/14/2020    CO2 25 10/14/2020    BUN 12 10/14/2020    CREATININE 0.70 10/14/2020    ANIONGAP 13 10/14/2020    ALKPHOS 126 10/11/2020    ALT 9 10/11/2020    AST 19 10/11/2020    BILITOT 1.12 10/11/2020    LABALBU 4.0 10/11/2020    ALBUMIN 1.2 10/11/2020    LABGLOM >60 10/14/2020    GFRAA >60 10/14/2020    GFR      10/14/2020    GFR NOT REPORTED 10/14/2020    PROT 7.3 10/11/2020    CALCIUM 8.9 10/14/2020     PT/INR:    Lab Results   Component Value Date    PROTIME 13.7 10/11/2020    PROTIME 16.6 08/16/2018    INR 1.3 10/11/2020     PTT:   Lab Results   Component Value Date    APTT 28.0 10/11/2020     U/A:    Lab Results   Component Value Date    COLORU YELLOW 10/11/2020    TURBIDITY CLEAR 10/11/2020    SPECGRAV 1.016 10/11/2020    HGBUR SMALL 10/11/2020    PHUR 5.0 10/11/2020    PROTEINU NEGATIVE 10/11/2020    GLUCOSEU NEGATIVE 10/11/2020    KETUA NEGATIVE 10/11/2020    BILIRUBINUR NEGATIVE 10/11/2020    BILIRUBINUR - 03/05/2020    UROBILINOGEN Normal 10/11/2020    NITRU NEGATIVE 10/11/2020    LEUKOCYTESUR NEGATIVE 10/11/2020     Radiology:  Xr Chest Portable    Result Date: 10/11/2020  Multifocal infiltrate in the right lung and also likely in the left lung base. This may represent an inflammatory process or infection. Ct Chest Pulmonary Embolism W Contrast    Result Date: 10/11/2020  1. Findings most likely represent multifocal pneumonia, right greater than left as detailed above. Follow-up is recommended to document resolution. 2. Upper abdominal free fluid, the significance of which is unclear. 3. No evidence for pulmonary embolus. 4. Four-chamber cardiac enlargement/cardiomegaly. Coronary artery disease. 5. Subcarinal lymph node enlargement measuring up to 2.1 x 2.4 cm. Mildly enlarged right hilar lymph node. 6. Cholelithiasis. 7. Postsurgical changes in association with the stomach. 8. Hypodense thyroid nodule measuring 2.5 x 1.3 cm. RECOMMENDATIONS: 2.5 cm incidental thyroid nodule. Recommend thyroid US.  Reference: J Am Minda Radiol. 2015 Feb;12(2): 143-50     Fl Modified Barium Swallow W Video    Result Date: 10/14/2020  No laryngeal penetration or tracheal aspiration of all tested consistencies. Premature vallecular spillage of all consistencies. Please see separate speech pathology report for full discussion of findings and recommendations. Consultations:    Consults:     Final Specialist Recommendations/Findings:   IP CONSULT TO HOSPITALIST  IP CONSULT TO CARDIOLOGY  IP CONSULT TO PULMONOLOGY  IP CONSULT TO GI  IP CONSULT TO HOME CARE NEEDS  IP CONSULT TO HOME CARE NEEDS      The patient was seen and examined on day of discharge and this discharge summary is in conjunction with any daily progress note from day of discharge. Discharge plan:     Disposition: Home with home health care    Physician Follow Up:   Bariatric surgery, Dr. Jayce Seaman    Diet: Dysphagia diet    Activity: As tolerated    Patient instructions: Please follow-up with your primary care physician in 1 week's time for management of iron deficiency anemia. Follow-up with surgeon Dr. Jayce Seaman for history of outlet obstruction and possible revision bariatric surgery  Take small bites, chew food thoroughly, eat soft foods. Sit up while eating. Recommend dysphagia diet. A 2.5 x 1.3 cm hypodense thyroid nodule was noted on routine CT of the chest.  You will need repeat imaging on an outpatient basis.   Please follow-up with your primary care for provider    Discharge Medications:      Medication List      START taking these medications    ascorbic acid 500 MG tablet  Commonly known as:  VITAMIN C  Take 1 tablet by mouth daily     ferrous sulfate 325 (65 Fe) MG EC tablet  Commonly known as:  FE TABS 325  Take 1 tablet by mouth 2 times daily (with meals)     lipase-protease-amylase 24563-52552 units delayed release capsule  Commonly known as:  CREON  Take by mouth 3 times daily (with meals)        CONTINUE taking these medications    albuterol 2 MG tablet  Commonly known as:  PROVENTIL  Use as needed     Armodafinil 200 MG Tabs     Breo Ellipta 200-25 MCG/INH Aepb inhaler  Generic drug:  Fluticasone furoate-vilanterol     bumetanide 1 MG tablet  Commonly known as:  BUMEX  TAKE 2 TABLETS BY MOUTH ON MONDAY, WEDNESDAY AND FRIDAY. TAKE 1 TABLET BY MOUTH ON ALL OTHER DAYS     cetirizine 10 MG tablet  Commonly known as:  ZYRTEC  TAKE 1 TABLET BY MOUTH DAILY     D-Mannose 500 MG Caps  Take 500 mg by mouth 3 times daily     fluticasone 50 MCG/ACT nasal spray  Commonly known as:  FLONASE  2 sprays by Nasal route daily     gabapentin 300 MG capsule  Commonly known as:  NEURONTIN  TAKE 1 CAPSULE BY MOUTH THREE TIMES DAILY     guaiFENesin 600 MG extended release tablet  Commonly known as:  SM Mucus Relief  Take 1 tablet by mouth 2 times daily     Handicap Placard Misc  by Does not apply route     Incontinence Brief Large Misc  To use as directed.  Use 3x a day     ipratropium-albuterol 0.5-2.5 (3) MG/3ML Soln nebulizer solution  Commonly known as:  DUONEB     metoprolol succinate 25 MG extended release tablet  Commonly known as:  TOPROL XL  Take 1 tablet by mouth 2 times daily     Multi-Vitamins Tabs  Take 1 tablet by mouth daily     pantoprazole 40 MG tablet  Commonly known as:  PROTONIX  Take 1 tablet by mouth daily     polyethylene glycol 17 GM/SCOOP powder  Commonly known as:  GLYCOLAX  Take 17 g by mouth daily     potassium chloride 20 MEQ extended release tablet  Commonly known as:  KLOR-CON M  Take 1 tablet by mouth 2 times daily     rivaroxaban 20 MG Tabs tablet  Commonly known as:  Xarelto  Take 1 tablet by mouth daily     rOPINIRole 0.25 MG tablet  Commonly known as:  REQUIP  TAKE 1 TABLET BY MOUTH TWICE DAILY     SM Lubricant Eye Drops 0.4-0.3 % ophthalmic solution  Generic drug:  polyethyl glycol-propyl glycol 0.4-0.3 %  PLACE 1 DROP INTO BOTH EYES FOUR TIMES DAILY AS NEEDED FOR DRY EYES     tiZANidine 4 MG tablet  Commonly known as:  ZANAFLEX  TAKE 1 TABLET BY MOUTH DAILY AS NEEDED     [employer] Orthotics Mens Misc  Provide insurance covered orthotics shoes, wear daily     vitamin B-12 100 MCG tablet  Commonly known as:  CYANOCOBALAMIN  TAKE 1 TABLET BY MOUTH DAILY AS NEEDED FOR FATIGUE        STOP taking these medications    spironolactone 25 MG tablet  Commonly known as:  ALDACTONE           Where to Get Your Medications      These medications were sent to [facility] [address] [facility], [address] [facility]  [address] [facility], [place] [address]    Phone:  [phone]   · ascorbic acid 500 MG tablet  · ferrous sulfate 325 (65 Fe) MG EC tablet  · lipase-protease-amylase 72118-36498 units delayed release capsule     These medications were sent to [address] [place] [address], [address] [facility] [address]  [address] [facility], [place] [address]    Phone:  [phone]   · tiZANidine 4 MG tablet         No discharge procedures on file. Time Spent on discharge is  37 mins in patient examination, evaluation, counseling as well as medication reconciliation, prescriptions for required medications, discharge plan and follow up. Electronically signed by   Rocio Caldera DO  10/17/2020  5:12 PM      Thank you Dr. Janett Lake PA-C for the opportunity to be involved in this patient's care.

## 2020-10-16 NOTE — PROGRESS NOTES
[facility]  Office: [address], DO, Tammy Ebbs, DO, Crow List, DO, Sunita Clements Blood, DO, Sathish Morales MD, Ryan Walters MD, Julio C Lagos MD, Maddie Gallardo MD, Darnell Phan MD, Mitul Curz MD, Mariya Domingo MD, Annie Duckworth MD, Marisela Bello MD, Quirino Lim, DO, Giovanny Yao MD, Valery Munguia MD, Rosa Canela, DO, Beth Victoria MD,  Mayur Weber, DO, Fabiola Ortiz MD, Solo Nova MD, Molly Jang, PAVEL, [facility], CNP, Mey Patrick, CNP, Desean Jones, CNS, Lionel Garcia, CNP, Erum Aleman, CNP, Nancy He, CNP, Jaylen Mccarthy, CNP, Meryl Heard, CNP, Mani De La Torre PA-C, Elvia Guallpa, MILY, David Haque, CNP, Adair Perez, CNP, Robert Maradiaga, CNP, Sada Wong, CNP, Yarely Lopez, CNP         [facility]   [address] [facility]    Progress Note    10/16/2020    12:56 PM    Name:   Alix Kohler  MRN:     [id number]     Acct:      [de-identified]   Room:   4970/4117-83   Day:  5  Admit Date:  10/11/2020  9:03 AM    PCP:   Kamari Morel PA-C  Code Status:  Full Code    Subjective:     C/C:   Chief Complaint   Patient presents with    Shortness of Breath    Fever     Interval History Status: unchanged. Pt seen and evaluated this a.m. No new complaints. Awaiting repeat scope from GI. He is afebrile and hemodynamically stable. Saturating well on room air. Discussed with GI and surgery teams. Brief History:     Jose Valle is a 31-year-old male who presented to the hospital complaining of shortness of breath and generalized weakness. Patient reported that he had been feeling unwell for approximately 1 week. He states that he had a uncontrollable coughing episode while his CPAP was on which subsequently led to vomiting and aspiration. Following that episode, he states that he developed a fever.   In the emergency department he underwent CT scan of the chest which suggested multifocal multiple pneumonia, right greater than left. He was started on Unasyn for presumed aspiration pneumonia. Cardiology was consulted regarding elevated troponin which appears to be chronic. Pt treated for aspiration pneumonia with 5 day course of unasyn. He has history of recurrent aspiration pneumonia. This is his second hospitalization for this issue within the past several months. Repeat swallow study here did not reveal any aspiration. GI was consulted regarding known esophageal stenosis which has previously required balloon dilatation. He will undergo EGD. Pulmonology consulted regarding episodes of hemoptysis and subcarinal lymph node enlargement, hilar lymph node enlargement. Thought to reactive. Repeat imaging will need to be repeated in the outpatient setting. Review of Systems:     Constitutional:  negative for chills, fevers, sweats  Respiratory:  + for cough, dyspnea on exertion, shortness of breath. Negative for wheezing  Cardiovascular:  negative for chest pain, chest pressure/discomfort, lower extremity edema, palpitations   Gastrointestinal:  negative for abdominal pain, constipation, diarrhea, nausea, vomiting  Neurological:  negative for dizziness, headache    Medications: Allergies:     Allergies   Allergen Reactions    Darvocet [Propoxyphene N-Acetaminophen] Hives    Other Hives    Oxycodone-Acetaminophen        Current Meds:   Scheduled Meds:    ferrous sulfate  325 mg Oral BID WC    vitamin C  500 mg Oral Daily    lipase-protease-amylase  36,000 Units Oral TID WC    budesonide-formoterol  2 puff Inhalation BID    albuterol  2 mg Oral BID    bumetanide  1 mg Oral Daily    cetirizine  10 mg Oral Daily    fluticasone  2 spray Nasal Daily    gabapentin  300 mg Oral TID    multivitamin  1 tablet Oral Daily    metoprolol succinate  25 mg Oral BID    pantoprazole  40 mg Oral Daily    [Held by provider] potassium chloride  20 mEq Oral BID    [Held by provider] rivaroxaban  20 (Axillary)   Resp 16   Ht 5' 10\" (1.778 m)   Wt 220 lb 1.6 oz (99.8 kg)   SpO2 100%   BMI 31.58 kg/m²   Temp (24hrs), Av.9 °F (36.6 °C), Min:97.3 °F (36.3 °C), Max:98.2 °F (36.8 °C)    No results for input(s): POCGLU in the last 72 hours. I/O (24Hr): Intake/Output Summary (Last 24 hours) at 10/16/2020 1256  Last data filed at 10/16/2020 0635  Gross per 24 hour   Intake 1316 ml   Output 2350 ml   Net -1034 ml       Labs:  Hematology:  Recent Labs     10/13/20  1645 10/14/20  0917 10/15/20  1240   WBC  --  5.0  --    RBC  --  3.16*  --    HGB  --  9.5*  --    HCT  --  32.0*  --    MCV  --  101.3  --    MCH  --  30.1  --    MCHC  --  29.7  --    RDW  --  13.8  --    PLT  --  144  --    MPV  --  9.1  --    CRP 94.2*  --  54.8*     Chemistry:  Recent Labs     10/14/20  0917      K 4.0   CL 99   CO2 25   GLUCOSE 110*   BUN 12   CREATININE 0.70   ANIONGAP 13   LABGLOM >60   GFRAA >60   CALCIUM 8.9     No results for input(s): PROT, LABALBU, LABA1C, O0ERROI, O1MSLMZ, FT4, TSH, AST, ALT, LDH, GGT, ALKPHOS, LABGGT, BILITOT, BILIDIR, AMMONIA, AMYLASE, LIPASE, LACTATE, CHOL, HDL, LDLCHOLESTEROL, CHOLHDLRATIO, TRIG, VLDL, VQT53KQ, PHENYTOIN, PHENYF, URICACID, POCGLU in the last 72 hours. ABG:No results found for: POCPH, PHART, PH, POCPCO2, MBK1PSE, PCO2, POCPO2, PO2ART, PO2, POCHCO3, SSO9GBU, HCO3, NBEA, PBEA, BEART, BE, THGBART, THB, DNM7RLY, OSCL1XGS, B3UNHUMX, O2SAT, FIO2  Lab Results   Component Value Date/Time    SPECIAL NOT REPORTED 10/13/2020 12:05 PM     Lab Results   Component Value Date/Time    CULTURE NORMAL RESPIRATORY YORDY LIGHT GROWTH 10/13/2020 12:05 PM       Radiology:  Xr Chest Portable    Result Date: 10/11/2020  Multifocal infiltrate in the right lung and also likely in the left lung base. This may represent an inflammatory process or infection. Ct Chest Pulmonary Embolism W Contrast    Result Date: 10/11/2020  1.  Findings most likely represent multifocal pneumonia, right greater than left as detailed above. Follow-up is recommended to document resolution. 2. Upper abdominal free fluid, the significance of which is unclear. 3. No evidence for pulmonary embolus. 4. Four-chamber cardiac enlargement/cardiomegaly. Coronary artery disease. 5. Subcarinal lymph node enlargement measuring up to 2.1 x 2.4 cm. Mildly enlarged right hilar lymph node. 6. Cholelithiasis. 7. Postsurgical changes in association with the stomach. 8. Hypodense thyroid nodule measuring 2.5 x 1.3 cm. RECOMMENDATIONS: 2.5 cm incidental thyroid nodule. Recommend thyroid US. Reference: J Am Minda Radiol. 2015 Feb;12(2): 143-50     Procedure(s):  EGD SUBMUCOSAL/BOTOX INJECTION tattoo     Findings:  1. Normal esophagus. 2. Evidence of vertical banded gastroplasty with a narrow outlet measuring 10 mm in diameter. 3. Large amount of food bezoar in the gastric pouch interfering with visualization. 4. The mucosa appeared normal.  5. The adult endoscope was traversed through the gastric pouch outlet and entered into the remnant stomach. The antrum and pylorus appeared normal.  6. The retroflexed view of the excluded stomach appeared normal.  7. The staple line from prior gastroplasty was intact. This was tattooed using [place] ink.  8. The examined duodenum up to the second portion appeared normal.        Recommendations  1. Start pancreatic lipase enzymes 36,000 units 3 times daily with meals. 2. Start full liquid diet and encourage use of carbonated cola drinks. 3. We will need repeat upper GI in 1 to 2 days to assess the status of the gastric pouch and outlet  4. The options for correction include revision bariatric surgery versus Endo bariatrics. 5. Endo bariatrics option include creation of a GG fistula from the gastric pouch into the excluded stomach and placement of a fully covered metal stent. 6. We will discuss options with the patient.     Physical Examination:        General appearance:  alert, cooperative and no distress  Mental Status:  oriented to person, place and time and normal affect  Lungs: basilar crackles, faint. Heart:  regular rate and rhythm, no murmur  Abdomen:  soft, nontender, nondistended, normal bowel sounds, no masses, hepatomegaly, splenomegaly  Extremities:  no edema, redness, tenderness in the calves  Skin:  no gross lesions, rashes, induration    Assessment:        Hospital Problems           Last Modified POA    * (Principal) Pneumonia due to organism 10/11/2020 Yes    Atrial fibrillation ([facility] [address] 10/11/2020 Yes    GERD (gastroesophageal reflux disease) 10/11/2020 Yes    Hypertension 10/11/2020 Yes    MARIAMA on CPAP 10/11/2020 Yes    COPD (chronic obstructive pulmonary disease) ([facility] [address] 10/11/2020 Yes    Chronic diastolic heart failure ([facility] [address] 10/11/2020 Yes    H/O gastric bypass 10/11/2020 Yes          Plan:        #Aspiration pneumonia/hemoptysis/Subcarinal lymph node enlargement measuring up to 2.1 x 2.4 cm. Mildly enlarged right hilar lymph node. - s/p unasyn x 5 days  - will need repeat imaging and follow-up with his pulmonologist, Dr. Sammy Medina to further evaluate lymphadenopathy and hemoptysis  - pt passed swallow study without aspiration  - GI consulted and pt underwent EGD, results detailed above. Recommendations as follows:  7. Start pancreatic lipase enzymes 36,000 units 3 times daily with meals. 8. Start full liquid diet and encourage use of carbonated cola drinks. 9. We will need repeat upper GI in 1 to 2 days to assess the status of the gastric pouch and outlet  10. The options for correction include revision bariatric surgery versus Endo bariatrics. 11. Endo bariatrics option include creation of a GG fistula from the gastric pouch into the excluded stomach and placement of a fully covered metal stent. 12. We will discuss options with the patient. #Atrial fibrillation/HFpEF/Troponin elevation  - consultation placed to cardiology, Dr. Jared Howard.  No further work-up warranted  - bumex, toprol.  Resume xarelto after cleared by GI  - aldactone currently on hold, will consider discontinuing given soft blood pressures    #COPD  - continue symbicort  - duoneb q4h  - wean O2 as tolerated    #Anemia  - iron deficiency, will start vitamin C and oral iron supplementation  - B12/folate pending    #Thyroid mass  - recommend ultrasound as an outpatient to further assess thyroid mass    #Monitor electrolytes, replete as necessary  #PT/OT assessment    Delroy Gupta PA-C  10/16/2020  12:56 PM

## 2020-10-16 NOTE — PROGRESS NOTES
THE [facility] AT [facility]   Progress Note    Isabela Gunn is a 69 y.o. male patient. Hospitalization Day:5      Chief consult reason:   History of bariatric banding with complication of gastric outlet obstruction requiring multiple EGD's with dilatations    Subjective:  Patient had no acute events over night. Patient had dinner and breakfast.  Vitals stable    VITALS:  /65   Pulse 58   Temp 97.3 °F (36.3 °C) (Axillary)   Resp 16   Ht 5' 10\" (1.778 m)   Wt 220 lb 1.6 oz (99.8 kg)   SpO2 100%   BMI 31.58 kg/m²   TEMPERATURE:  Current - Temp: 97.3 °F (36.3 °C); Max - Temp  Av.9 °F (36.6 °C)  Min: 97.3 °F (36.3 °C)  Max: 98.2 °F (36.8 °C)    Physical Assessment:  General appearance:  alert, cooperative and no distress  Mental Status:  oriented to person, place and time and normal affect  Lungs:  clear to auscultation bilaterally, normal effort  Heart:  regular rate and rhythm, no murmur  Abdomen:  soft, nontender, nondistended, normal bowel sounds, no masses, hepatomegaly, splenomegaly  Extremities:  no edema, redness, tenderness in the calves  Skin:  no gross lesions, rashes, induration    Data Review:    Labs and Imaging:     CBC:  Recent Labs     10/14/20  0917   WBC 5.0   HGB 9.5*   .3   RDW 13.8          ANEMIA STUDIES:  Recent Labs     10/14/20  0609   LABIRON 9*   TIBC 260       BMP:  Recent Labs     10/14/20  0917      K 4.0   CL 99   CO2 25   BUN 12   CREATININE 0.70   GLUCOSE 110*   CALCIUM 8.9       LFTS:  No results for input(s): ALKPHOS, ALT, AST, BILITOT, BILIDIR, LABALBU in the last 72 hours. Amylase/Lipase and Ammonia:  No results for input(s): AMYLASE, LIPASE, AMMONIA in the last 72 hours.     Acute Hepatitis Panel:  Lab Results   Component Value Date    HEPCAB NONREACTIVE 04/10/2017       HCV Genotype:  No results found for: HEPATITISCGENOTYPE    HCV Quantitative:  No results found for: HCVQNT    LIVER WORK UP:    AFP  No results found for: AFP    Alpha 1 antitrypsin   No results found for: A1A    Anti - Liver/Kidney Ab  No results found for: LIVER-KIDNEYMICROSOMALAB    REED  Lab Results   Component Value Date    REED NEGATIVE 10/13/2020       AMA  No results found for: Estle Milder    ASMA  No results found for: SMOOTHMUSCAB    Ceruloplasmin  No results found for: CERULOPLSM    Celiac panel  No results found for: TISSTRNTIIGG, TTGIGA, IGA    PT/INR  No results for input(s): PROTIME, INR in the last 72 hours. Cancer Markers:  CEA:  No results for input(s): CEA in the last 72 hours. Ca 125:  No results for input(s):  in the last 72 hours. Ca 19-9:   Invalid input(s):   AFP: No results for input(s): AFP in the last 72 hours. Lactic acid:Invalid input(s): LACTIC ACID    Radiology Review:    No results found. Principal Problem:    Pneumonia due to organism  Active Problems:    Atrial fibrillation (HCC)    GERD (gastroesophageal reflux disease)    Hypertension    MARIAMA on CPAP    COPD (chronic obstructive pulmonary disease) (HCC)    Chronic diastolic heart failure (HCC)    H/O gastric bypass  Resolved Problems:    * No resolved hospital problems. *       GI Assessment:  A.74 yr old male admitted with suspected aspiration pneumonia after coughing, vomiting while wearing C-pap. Endoscopy done and it shows the following findings. 1.    Normal esophagus. Evidence of vertical banded gastroplasty with a narrow outlet measuring 10 mm in diameter  2. Large amount of food bezoar in the gastric pouch interfering with visualization. 3. The mucosa appeared normal.  4. The adult endoscope was traversed through the gastric pouch outlet and entered into the remnant stomach. The antrum and pylorus appeared normal.  5. The retroflexed view of the excluded stomach appeared normal.  6. The staple line from prior gastroplasty was intact. This was tattooed using [place] ink.  7. The examined duodenum up to the second portion appeared n  B. Iron deficient anemia-no overt bleeding  C. HX Afib-on Xarelto-last dose 10/12/2020  D. Suspected aspiration Pneumonia-mgt per primary    Plan and Recommendations:  1. Start pancreatic lipase enzymes 36,000 units 3 times daily with meals. 2. Start full liquid diet and encourage use of carbonated cola drinks. 3. The options for correction include revision bariatric surgery versus Endo bariatrics. 4. Discussed with the patient to have follow up with Bariatric Surgeon to discuss options of revision surgery  5. OK to be discharged from gastroenterology standpoint. 6. GI will sign off      Patient discussed with Sonal Garay. Thank you for allowing me to participate in the care of your patient. Please feel free to contact me with any questions or concerns. Leti De Leon MD   PGY-1, [facility]      Attending Physician Statement  I have discussed the care of Sanchez Barone, including pertinent history and exam findings, and formulating the plan of care with the resident. I have seen and examined the patient and the key elements of all parts of the encounter and agree with the assessment, plan and orders as documented by the resident, with appropriate modifications.      Electronically signed by Juan Alberto Sanchez MD on 10/17/2020 at 1:54 AM

## 2020-10-17 VITALS
RESPIRATION RATE: 20 BRPM | WEIGHT: 213.3 LBS | HEART RATE: 71 BPM | DIASTOLIC BLOOD PRESSURE: 59 MMHG | HEIGHT: 70 IN | SYSTOLIC BLOOD PRESSURE: 98 MMHG | OXYGEN SATURATION: 97 % | TEMPERATURE: 98.5 F | BODY MASS INDEX: 30.54 KG/M2

## 2020-10-17 LAB
CULTURE: NORMAL
CULTURE: NORMAL
Lab: NORMAL
Lab: NORMAL
SPECIMEN DESCRIPTION: NORMAL
SPECIMEN DESCRIPTION: NORMAL

## 2020-10-17 PROCEDURE — 6370000000 HC RX 637 (ALT 250 FOR IP): Performed by: INTERNAL MEDICINE

## 2020-10-17 PROCEDURE — 94660 CPAP INITIATION&MGMT: CPT

## 2020-10-17 PROCEDURE — 94640 AIRWAY INHALATION TREATMENT: CPT

## 2020-10-17 PROCEDURE — 99239 HOSP IP/OBS DSCHRG MGMT >30: CPT | Performed by: INTERNAL MEDICINE

## 2020-10-17 PROCEDURE — 99233 SBSQ HOSP IP/OBS HIGH 50: CPT | Performed by: INTERNAL MEDICINE

## 2020-10-17 RX ADMIN — FLUTICASONE PROPIONATE 2 SPRAY: 50 SPRAY, METERED NASAL at 09:11

## 2020-10-17 RX ADMIN — PANTOPRAZOLE SODIUM 40 MG: 40 TABLET, DELAYED RELEASE ORAL at 09:10

## 2020-10-17 RX ADMIN — PANCRELIPASE 36000 UNITS: 24000; 76000; 120000 CAPSULE, DELAYED RELEASE PELLETS ORAL at 16:14

## 2020-10-17 RX ADMIN — GABAPENTIN 300 MG: 300 CAPSULE ORAL at 13:40

## 2020-10-17 RX ADMIN — IPRATROPIUM BROMIDE AND ALBUTEROL SULFATE 1 AMPULE: .5; 3 SOLUTION RESPIRATORY (INHALATION) at 11:39

## 2020-10-17 RX ADMIN — FERROUS SULFATE TAB EC 325 MG (65 MG FE EQUIVALENT) 325 MG: 325 (65 FE) TABLET DELAYED RESPONSE at 09:10

## 2020-10-17 RX ADMIN — THERA TABS 1 TABLET: TAB at 09:11

## 2020-10-17 RX ADMIN — METOPROLOL SUCCINATE 25 MG: 25 TABLET, FILM COATED, EXTENDED RELEASE ORAL at 09:11

## 2020-10-17 RX ADMIN — IPRATROPIUM BROMIDE AND ALBUTEROL SULFATE 1 AMPULE: .5; 3 SOLUTION RESPIRATORY (INHALATION) at 20:08

## 2020-10-17 RX ADMIN — GABAPENTIN 300 MG: 300 CAPSULE ORAL at 09:11

## 2020-10-17 RX ADMIN — GABAPENTIN 300 MG: 300 CAPSULE ORAL at 21:52

## 2020-10-17 RX ADMIN — ROPINIROLE HYDROCHLORIDE 0.25 MG: 0.25 TABLET, FILM COATED ORAL at 09:10

## 2020-10-17 RX ADMIN — BUMETANIDE 1 MG: 1 TABLET ORAL at 09:10

## 2020-10-17 RX ADMIN — OXYCODONE HYDROCHLORIDE AND ACETAMINOPHEN 500 MG: 500 TABLET ORAL at 09:09

## 2020-10-17 RX ADMIN — ALBUTEROL 2 MG: 2 TABLET ORAL at 21:52

## 2020-10-17 RX ADMIN — BUDESONIDE AND FORMOTEROL FUMARATE DIHYDRATE 2 PUFF: 160; 4.5 AEROSOL RESPIRATORY (INHALATION) at 20:08

## 2020-10-17 RX ADMIN — BUDESONIDE AND FORMOTEROL FUMARATE DIHYDRATE 2 PUFF: 160; 4.5 AEROSOL RESPIRATORY (INHALATION) at 08:05

## 2020-10-17 RX ADMIN — PANCRELIPASE 36000 UNITS: 24000; 76000; 120000 CAPSULE, DELAYED RELEASE PELLETS ORAL at 12:24

## 2020-10-17 RX ADMIN — FERROUS SULFATE TAB EC 325 MG (65 MG FE EQUIVALENT) 325 MG: 325 (65 FE) TABLET DELAYED RESPONSE at 16:14

## 2020-10-17 RX ADMIN — ROPINIROLE HYDROCHLORIDE 0.25 MG: 0.25 TABLET, FILM COATED ORAL at 21:52

## 2020-10-17 RX ADMIN — IPRATROPIUM BROMIDE AND ALBUTEROL SULFATE 1 AMPULE: .5; 3 SOLUTION RESPIRATORY (INHALATION) at 08:02

## 2020-10-17 RX ADMIN — IPRATROPIUM BROMIDE AND ALBUTEROL SULFATE 1 AMPULE: .5; 3 SOLUTION RESPIRATORY (INHALATION) at 16:24

## 2020-10-17 RX ADMIN — PANCRELIPASE 36000 UNITS: 24000; 76000; 120000 CAPSULE, DELAYED RELEASE PELLETS ORAL at 09:12

## 2020-10-17 RX ADMIN — CETIRIZINE HYDROCHLORIDE 10 MG: 10 TABLET ORAL at 09:10

## 2020-10-17 RX ADMIN — ALBUTEROL 2 MG: 2 TABLET ORAL at 09:09

## 2020-10-17 ASSESSMENT — PAIN DESCRIPTION - PROGRESSION
CLINICAL_PROGRESSION: NOT CHANGED

## 2020-10-17 ASSESSMENT — PAIN SCALES - GENERAL
PAINLEVEL_OUTOF10: 3
PAINLEVEL_OUTOF10: 0
PAINLEVEL_OUTOF10: 5

## 2020-10-17 ASSESSMENT — PAIN DESCRIPTION - LOCATION: LOCATION: HEAD

## 2020-10-17 ASSESSMENT — PAIN DESCRIPTION - PAIN TYPE: TYPE: ACUTE PAIN

## 2020-10-17 NOTE — PLAN OF CARE
Problem: Falls - Risk of:  Goal: Will remain free from falls  Description: Will remain free from falls  10/17/2020 0359 by Jennifer Mccormick RN  Outcome: Ongoing  10/16/2020 1728 by Melyssa Mary RN  Outcome: Ongoing  Goal: Absence of physical injury  Description: Absence of physical injury  10/17/2020 0359 by Jennifer Mccormick RN  Outcome: Ongoing  10/16/2020 1728 by Melyssa Mary RN  Outcome: Ongoing     Problem: Skin Integrity:  Goal: Will show no infection signs and symptoms  Description: Will show no infection signs and symptoms  10/17/2020 0359 by Jennifer Mccormick RN  Outcome: Ongoing  10/16/2020 1728 by Melyssa Mary RN  Outcome: Ongoing  Goal: Absence of new skin breakdown  Description: Absence of new skin breakdown  10/17/2020 0359 by Jennifer Mccormick RN  Outcome: Ongoing  10/16/2020 1728 by Melyssa Mary RN  Outcome: Ongoing     Problem: Musculor/Skeletal Functional Status  Goal: Highest potential functional level  10/17/2020 0359 by Jennifer Mccormick RN  Outcome: Ongoing  10/16/2020 1728 by Melyssa Mary RN  Outcome: Ongoing     Problem: Pain:  Goal: Pain level will decrease  Description: Pain level will decrease  10/17/2020 0359 by Jennifer Mccormick RN  Outcome: Ongoing  10/16/2020 1728 by Melyssa Mary RN  Outcome: Ongoing  Goal: Control of acute pain  Description: Control of acute pain  10/17/2020 0359 by Jennifer Mccormick RN  Outcome: Ongoing  10/16/2020 1728 by Melyssa Mary RN  Outcome: Ongoing  Goal: Control of chronic pain  Description: Control of chronic pain  10/17/2020 0359 by Jennifer Mccormick RN  Outcome: Ongoing  10/16/2020 1728 by Melyssa Mary RN  Outcome: Ongoing

## 2020-10-17 NOTE — PLAN OF CARE
Problem: Falls - Risk of:  Goal: Will remain free from falls  Description: Will remain free from falls  10/17/2020 0842 by Mando Schwab RN  Outcome: Ongoing  10/17/2020 0359 by Hima Hale RN  Outcome: Ongoing  Goal: Absence of physical injury  Description: Absence of physical injury  10/17/2020 0842 by Mando Schwab RN  Outcome: Ongoing  10/17/2020 0359 by Hima Hale RN  Outcome: Ongoing     Problem: Skin Integrity:  Goal: Will show no infection signs and symptoms  Description: Will show no infection signs and symptoms  10/17/2020 0842 by Mando Schwab RN  Outcome: Ongoing  10/17/2020 0359 by Hima Hale RN  Outcome: Ongoing  Goal: Absence of new skin breakdown  Description: Absence of new skin breakdown  10/17/2020 0842 by Mando Schwab RN  Outcome: Ongoing  10/17/2020 0359 by Hima Hale RN  Outcome: Ongoing     Problem: Musculor/Skeletal Functional Status  Goal: Highest potential functional level  10/17/2020 0842 by Mando Schwab RN  Outcome: Ongoing  10/17/2020 0359 by Hima Hale RN  Outcome: Ongoing     Problem: Pain:  Goal: Pain level will decrease  Description: Pain level will decrease  10/17/2020 0842 by Mando Schwab RN  Outcome: Ongoing  10/17/2020 0359 by Hima Hale RN  Outcome: Ongoing  Goal: Control of acute pain  Description: Control of acute pain  10/17/2020 0842 by Mando Schwab RN  Outcome: Ongoing  10/17/2020 0359 by Hima Hale RN  Outcome: Ongoing  Goal: Control of chronic pain  Description: Control of chronic pain  10/17/2020 0842 by Mando Schwab RN  Outcome: Ongoing  10/17/2020 0359 by Hima Hale RN  Outcome: Ongoing

## 2020-10-17 NOTE — PROGRESS NOTES
[facility]  Office: [address], DO, Lukas Wade, DO, Jeyson Slater, DO, Treasure Burch, DO, Johnna Perez MD, Antonio García MD, Sachin Shields MD, Na Ruiz MD, Enrrique Flood MD, Beryle Marseille, MD, Ciro Pallas, MD, Nickolas Beaver MD, Marisela Harrison MD, Graham Molina, DO, Cee Blanca MD, Luma Black MD, Nora Maguire DO, Emerson Staples MD,  Augusta Pascal DO, Aramis Tinsley MD, Radha Hyatt MD, Cary Campos, [facility], [facility] Chen, [facility], Fozia Hidalgo, CNP, Bin Love, CNS, Mauri Soria, CNP, Shyla Butterfield, CNP, Loraine Hopkins, CNP, Johnson Rick, CNP, Alverto Deluca, CNP, Margaret Garcia PA-C, Mortimer Guardian, [facility], Tony Gregory, CNP, Lavonne Frankel, CNP, Linda Hui, CNP, Aron Carrasco, CNP, [place], [facility]   [address]    Progress Note    10/17/2020    4:49 PM    Name:   Omi Kaur  MRN:     [id number]     Acct:      [de-identified]   Room:   [address] [place] [address] Day:  6  Admit Date:  10/11/2020  9:03 AM    PCP:   Stephy Schaefer PA-C  Code Status:  Full Code    Subjective:     C/C:   Chief Complaint   Patient presents with    Shortness of Breath    Fever     Interval History Status: not changed. No acute events overnight. Patient was evaluated by pulmonology who did not recommend bronchoscopy. Patient denies any chest pain, nausea, vomiting, diarrhea. Vital signs have been stable. Review of Systems:     Constitutional:  negative for chills, fevers, sweats  Respiratory:  + for cough, dyspnea on exertion, shortness of breath. Negative for wheezing  Cardiovascular:  negative for chest pain, chest pressure/discomfort, lower extremity edema, palpitations   Gastrointestinal:  negative for abdominal pain, constipation, diarrhea, nausea, vomiting  Neurological:  negative for dizziness, headache    Medications: Allergies:     Allergies   Allergen Reactions    Darvocet [Propoxyphene N-Acetaminophen] Hives    Other Hives    Oxycodone-Acetaminophen        Current Meds:   Scheduled Meds:    ferrous sulfate  325 mg Oral BID WC    vitamin C  500 mg Oral Daily    lipase-protease-amylase  36,000 Units Oral TID WC    budesonide-formoterol  2 puff Inhalation BID    albuterol  2 mg Oral BID    bumetanide  1 mg Oral Daily    cetirizine  10 mg Oral Daily    fluticasone  2 spray Nasal Daily    gabapentin  300 mg Oral TID    multivitamin  1 tablet Oral Daily    metoprolol succinate  25 mg Oral BID    pantoprazole  40 mg Oral Daily    [Held by provider] potassium chloride  20 mEq Oral BID    [Held by provider] rivaroxaban  20 mg Oral Daily    rOPINIRole  0.25 mg Oral BID    [Held by provider] spironolactone  25 mg Oral Daily    sodium chloride flush  10 mL Intravenous 2 times per day    ipratropium-albuterol  1 ampule Inhalation Q4H WA     Continuous Infusions:    sodium chloride 30 mL/hr at 10/15/20 1417     PRN Meds: traMADol, acetaminophen, ipratropium-albuterol, sodium chloride flush, magnesium hydroxide, promethazine **OR** ondansetron, polyethylene glycol    Data:     Past Medical History:   has a past medical history of Acute superficial gastritis without hemorrhage, Anastomotic stricture of stomach, Asthma, Atrial fibrillation ([facility] [address] Calculus of gallbladder without cholecystitis without obstruction, Chronic diastolic heart failure (HCC), Chronic rhinosinusitis, Class 2 severe obesity due to excess calories with serious comorbidity and body mass index (BMI) of 36.0 to 36.9 in [facility]), COPD (chronic obstructive pulmonary disease) ([facility]), E. coli septicemia ([facility] [address] E. coli UTI, Foot drop, right, Fracture of femur ([facility] [address] Gait disorder, Gastric out let obstruction, GERD (gastroesophageal reflux disease), Hallux valgus, acquired, bilateral, Hyperlipidemia, Hypertension, Impaired hearing, Internal hemorrhoids, Lymphedema of both lower extremities, Nausea & vomiting, OA (osteoarthritis) of knee, bilateral, Obesity, MARIAMA on CPAP, Osteoarthritis, Osteoarthritis of lumbar spine, S/P revision of total knee, Septicemia due to E. coli (HCC), Simple chronic bronchitis (HCC), Slow transit constipation, Unspecified sleep apnea, and UTI (urinary tract infection). Social History:   reports that he quit smoking about 43 years ago. He has a 20.00 pack-year smoking history. He has never used smokeless tobacco. He reports that he does not drink alcohol or use drugs. Family History:   Family History   Problem Relation Age of Onset   Floydene Ada Cancer Mother     Heart Attack Father        Vitals:  /64   Pulse 63   Temp 98 °F (36.7 °C) (Oral)   Resp 17   Ht 5' 10\" (1.778 m)   Wt 213 lb 4.8 oz (96.8 kg)   SpO2 97%   BMI 30.61 kg/m²   Temp (24hrs), Av.3 °F (36.8 °C), Min:98 °F (36.7 °C), Max:98.7 °F (37.1 °C)    No results for input(s): POCGLU in the last 72 hours. I/O (24Hr): Intake/Output Summary (Last 24 hours) at 10/17/2020 1649  Last data filed at 10/17/2020 1619  Gross per 24 hour   Intake 591 ml   Output 1345 ml   Net -754 ml       Labs:  Hematology:  Recent Labs     10/15/20  1240   CRP 54.8*     Chemistry:No results for input(s): NA, K, CL, CO2, GLUCOSE, BUN, CREATININE, MG, ANIONGAP, LABGLOM, GFRAA, CALCIUM, CAION, PHOS, PSA, PROBNP, TROPHS, CKTOTAL, CKMB, CKMBINDEX, MYOGLOBIN, DIGOXIN, LACTACIDWB in the last 72 hours. No results for input(s): PROT, LABALBU, LABA1C, H6SIGMV, T6ACSQM, FT4, TSH, AST, ALT, LDH, GGT, ALKPHOS, LABGGT, BILITOT, BILIDIR, AMMONIA, AMYLASE, LIPASE, LACTATE, CHOL, HDL, LDLCHOLESTEROL, CHOLHDLRATIO, TRIG, VLDL, ATE41QR, PHENYTOIN, PHENYF, URICACID, POCGLU in the last 72 hours.   ABG:No results found for: POCPH, PHART, PH, POCPCO2, YUG5WMD, PCO2, POCPO2, PO2ART, PO2, POCHCO3, EGB5OZK, HCO3, NBEA, PBEA, BEART, BE, THGBART, THB, CLN7CDY, OUKV4ETQ, I0FFEAUV, O2SAT, FIO2  Lab Results   Component Value Date/Time    SPECIAL NOT REPORTED 10/13/2020 12:05 PM Lab Results   Component Value Date/Time    CULTURE NORMAL RESPIRATORY YORDY LIGHT GROWTH 10/13/2020 12:05 PM       Radiology:  Xr Chest Portable    Result Date: 10/11/2020  Multifocal infiltrate in the right lung and also likely in the left lung base. This may represent an inflammatory process or infection. Ct Chest Pulmonary Embolism W Contrast    Result Date: 10/11/2020  1. Findings most likely represent multifocal pneumonia, right greater than left as detailed above. Follow-up is recommended to document resolution. 2. Upper abdominal free fluid, the significance of which is unclear. 3. No evidence for pulmonary embolus. 4. Four-chamber cardiac enlargement/cardiomegaly. Coronary artery disease. 5. Subcarinal lymph node enlargement measuring up to 2.1 x 2.4 cm. Mildly enlarged right hilar lymph node. 6. Cholelithiasis. 7. Postsurgical changes in association with the stomach. 8. Hypodense thyroid nodule measuring 2.5 x 1.3 cm. RECOMMENDATIONS: 2.5 cm incidental thyroid nodule. Recommend thyroid US. Reference: J Am Minda Radiol. 2015 Feb;12(2): 143-50     Fl Modified Barium Swallow W Video    Result Date: 10/14/2020  No laryngeal penetration or tracheal aspiration of all tested consistencies. Premature vallecular spillage of all consistencies. Please see separate speech pathology report for full discussion of findings and recommendations.        Physical Examination:        General appearance:  alert, cooperative and no distress  Mental Status:  oriented to person, place and time and normal affect  Lungs:  clear to auscultation bilaterally, normal effort  Heart:  regular rate and rhythm, no murmur  Abdomen:  soft, nontender, nondistended, normal bowel sounds, no masses, hepatomegaly, splenomegaly  Extremities:  no edema, redness, tenderness in the calves  Skin:  no gross lesions, rashes, induration    Assessment:        Hospital Problems           Last Modified POA    Atrial fibrillation ([address] 10/16/2020 Yes    GERD (gastroesophageal reflux disease) 10/16/2020 Yes    Hypertension 10/16/2020 Yes    MARIAMA on CPAP 10/16/2020 Yes    COPD (chronic obstructive pulmonary disease) ([facility] [address] 10/16/2020 Yes    Chronic diastolic heart failure ([facility] [address] 10/16/2020 Yes    Status post gastroplasty 10/16/2020 Yes          Plan:        #Aspiration pneumonia/hemoptysis/Subcarinal lymph node enlargement measuring up to 2.1 x 2.4 cm. Mildly enlarged right hilar lymph node. - s/p unasyn x 5 days  - will need repeat imaging and follow-up with his pulmonologist, Dr. Parul Art to further evaluate lymphadenopathy and hemoptysis  - pt passed swallow study without aspiration  - GI consulted and pt underwent EGD, results detailed above. Recommendations as follows:  7. Start pancreatic lipase enzymes 36,000 units 3 times daily with meals. 8. Start full liquid diet and encourage use of carbonated cola drinks. 9. We will need repeat upper GI in 1 to 2 days to assess the status of the gastric pouch and outlet  10. The options for correction include revision bariatric surgery versus Endo bariatrics. 11. Endo bariatrics option include creation of a GG fistula from the gastric pouch into the excluded stomach and placement of a fully covered metal stent. 12. We will discuss options with the patient.     #Atrial fibrillation/HFpEF/Troponin elevation  - consultation placed to cardiology, Dr. Bernal Handler. No further work-up warranted  - bumex, toprol.  Resume xarelto after cleared by GI  - aldactone currently on hold, will consider discontinuing given soft blood pressures     #COPD  - continue symbicort  - duoneb q4h  - wean O2 as tolerated     #Anemia  - iron deficiency, will start vitamin C and oral iron supplementation  - B12/folate pending     #Thyroid mass  - recommend ultrasound as an outpatient to further assess thyroid mass     #Monitor electrolytes, replete as necessary  #PT/OT assessment    Darrius Horowitz DO  10/17/2020  4:49 PM \

## 2020-10-17 NOTE — ED NOTES
Writer received call from the floor regarding discharge orders for home health care. Writer reviewed chart and contacted [facility] regarding orders. [facility] report they have received all necessary documents and will reach out to patient at home tomorrow. Writer informed RN of this information.      JESUS Carrion  10/17/20 1942

## 2020-10-17 NOTE — PROGRESS NOTES
unintentional weight loss, dysphagia, odynophagia, hematemesis. Patient presented with a low-grade fever, nontachycardic, and hemodynamically stable. Room air saturating well. Initial BMP unremarkable. proBNP elevated at 4000. Troponin 50. Hepatic panel negative. BC demonstrated normocytic anemia. No leukocytosis. COVID-19 negative. Respiratory cultures negative thus far. Pneumonia antigen negative. Chest x-ray on 10/11/2020 demonstrated multifocal infiltrate in the right lung as well as left lung base. CT chest on 10/11 showed multifocal pneumonia right greater than left. No evidence of pulmonary embolism. Cardiomegaly noted. There was subcarinal lymph node enlargement measuring 2.1 x 2.4 cm. Mildly enlarged right hilar lymph node. Of note patient has a history of prior gastric banding in 4058 with complications of gastric outlet obstruction requiring intermittent dilations. EGD in 10/2018 with dilation to 15 mm. Patient had another EGD 11/2018 with banding. Patient has also been treated for multiple pneumonias. In 6/2020 patient was treated for bradycardia and chf exacerbation at [facility]. Patient was also treated with vancomycin /zosyn x 7 days for possible aspiration pneumonia. Patient was then monitored off abx. Admitted again at [facility]. [facility] and treated for MDR enterobacter from the sputum that grew from the facility at [facility] with meropenem. Patient discharged on meropenem  Until 7/7/2020.        PAST MEDICAL HISTORY:        Diagnosis Date    Acute superficial gastritis without hemorrhage 5/26/2018    Anastomotic stricture of stomach     Asthma     Atrial fibrillation ([facility]     On Xarelto 6/27/2020    Calculus of gallbladder without cholecystitis without obstruction 5/2/2017    Chronic diastolic heart failure ([address] 11/17/2017    Chronic rhinosinusitis 4/13/2015    Class 2 severe obesity due to excess calories with serious comorbidity and body mass index (BMI) of 36.0 to 36.9 in adult [facility]) 11/17/2017    COPD (chronic obstructive pulmonary disease) ([facility])     E. coli septicemia ([facility]     E. coli UTI     Foot drop, right 7/10/2012    Fracture of femur ([facility] [address] 10/23/2016    Gait disorder 7/20/2016    Gastric out let obstruction     GERD (gastroesophageal reflux disease)     Hallux valgus, acquired, bilateral 6/24/2015    Hyperlipidemia     Hypertension     Impaired hearing 4/13/2015    Internal hemorrhoids 1/3/2017    Lymphedema of both lower extremities 6/24/2015    Nausea & vomiting 11/16/2018    OA (osteoarthritis) of knee, bilateral 12/11/2006    Obesity     MARIAMA on CPAP 5/2/2017    Osteoarthritis     Osteoarthritis of lumbar spine 12/13/2007     replace inactive diagnosis    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 coli ([facility])     Due to UTI     Simple chronic bronchitis ([facility] 4/10/2018    Slow transit constipation 11/3/2016    Unspecified sleep apnea     UTI (urinary tract infection)      PAST SURGICAL HISTORY:        Procedure Laterality Date    CARPAL TUNNEL RELEASE      bilateral    COLONOSCOPY      CYSTOSCOPY  01/02/2019    by Dr. Ady Sims N/A 1/2/2019    CYSTOSCOPY performed by Kam Cabrera MD at [address]    first knuckle right index finger   [facility]    vertical banded gastroplasty   [address] REPLACEMENT  2004 & 2005    bilateral knees    MD EGD [address] N/A 8/16/2018    EGD FOREIGN BODY REMOVAL performed by Hernandez Love MD at [address] EGD TRANSORAL BIOPSY SINGLE/MULTIPLE N/A 5/25/2018    EGD BIOPSY performed by Kelly Velázquez DO at [address]      left shoulder    UPPER GASTROINTESTINAL ENDOSCOPY  08/13/2018    removal of food bolus    UPPER GASTROINTESTINAL ENDOSCOPY N/A 8/13/2018    EGD FOREIGN BODY REMOVAL performed by Kelly Velázquez DO at [address] [facility] GASTROINTESTINAL ENDOSCOPY N/A 8/13/2018    EGD BIOPSY performed by Katarzyna Marie DO at [address] [facility]  08/16/2018    egd with balloon dilitation    UPPER GASTROINTESTINAL ENDOSCOPY  8/16/2018    EGD DILATION BALLOON performed by Pako Germain MD at [address] [facility] 10/1/2018    EGD BIOPSY performed by Sabrina Avina MD at [address] [place] [address]  10/1/2018    EGD DILATION BALLOON performed by Sabrina Avina MD at [address] [place] [address] N/A 11/19/2018    EGD DILATION BALLOON performed by Sabrina Avina MD at [address] [place] [address] N/A 10/15/2020    EGD SUBMUCOSAL/BOTOX INJECTION tattoo  performed by Gildardo Kaba MD at [facility]     FAMILY HISTORY:       Problem Relation Age of Onset    Cancer Mother     Heart Attack Father      SOCIAL HISTORY:   TOBACCO:   reports that he quit smoking about 43 years ago. He has a 20.00 pack-year smoking history. He has never used smokeless tobacco.  ETOH:  reports no history of alcohol use. DRUGS: reports no history of drug use. ALLERGIES:    Allergies   Allergen Reactions    Darvocet [Propoxyphene N-Acetaminophen] Hives    Other Hives    Oxycodone-Acetaminophen          HOME MEDICATIONS:  Prior to Admission medications    Medication Sig Start Date End Date Taking? Authorizing Provider   lipase-protease-amylase (CREON) 91705-41372 units delayed release capsule Take by mouth 3 times daily (with meals) 10/16/20  Yes Angelina Garcia PA-C   ferrous sulfate (FE TABS 325) 325 (65 Fe) MG EC tablet Take 1 tablet by mouth 2 times daily (with meals) 10/16/20  Yes Angelina Garcia PA-C   vitamin C (VITAMIN C) 500 MG tablet Take 1 tablet by mouth daily 10/17/20  Yes Angelina Garcia PA-C   bumetanide (BUMEX) 1 MG tablet TAKE 2 TABLETS BY MOUTH ON MONDAY, WEDNESDAY AND FRIDAY.  TAKE 1 TABLET BY MOUTH ON ALL OTHER DAYS 9/17/20  Yes Vera Romberg, PA-C   rOPINIRole (REQUIP) 0.25 MG tablet TAKE 1 TABLET BY MOUTH TWICE DAILY 8/20/20  Yes Vera Romberg, PA-C   gabapentin (NEURONTIN) 300 MG capsule TAKE 1 CAPSULE BY MOUTH THREE TIMES DAILY 9/18/20 11/17/20 Yes Vera Romberg, PA-C   rivaroxaban (XARELTO) 20 MG TABS tablet Take 1 tablet by mouth daily 7/16/20  Yes Vera Romberg, PA-C   tiZANidine (ZANAFLEX) 4 MG tablet TAKE 1 TABLET BY MOUTH DAILY AS NEEDED 10/15/20   Vera Romberg, PA-C   vitamin B-12 (CYANOCOBALAMIN) 100 MCG tablet TAKE 1 TABLET BY MOUTH DAILY AS NEEDED FOR FATIGUE 7/23/20   Vera Romberg, PA-C   metoprolol succinate (TOPROL XL) 25 MG extended release tablet Take 1 tablet by mouth 2 times daily 7/16/20   Vera Romberg, PA-C   pantoprazole (PROTONIX) 40 MG tablet Take 1 tablet by mouth daily 7/16/20   Vera Romberg, PA-C   potassium chloride (KLOR-CON M) 20 MEQ extended release tablet Take 1 tablet by mouth 2 times daily 7/16/20   Vera Romberg, PA-C   Multiple Vitamin (MULTI-VITAMINS) TABS Take 1 tablet by mouth daily 7/16/20   Vera Romberg, PA-C   cetirizine (ZYRTEC) 10 MG tablet TAKE 1 TABLET BY MOUTH DAILY 5/27/20   Vera Romberg, PA-C   fluticasone [facility]) 50 MCG/ACT nasal spray 2 sprays by Nasal route daily 3/5/20   Vera Romberg, PA-C   guaiFENesin (SM MUCUS RELIEF) 600 MG extended release tablet Take 1 tablet by mouth 2 times daily 3/5/20   Vera Romberg, PA-C   D-Mannose 500 MG CAPS Take 500 mg by mouth 3 times daily 11/11/19 12/11/19  Itzel Graff MD   SM LUBRICANT EYE DROPS 0.4-0.3 % ophthalmic solution PLACE 1 DROP INTO BOTH EYES FOUR TIMES DAILY AS NEEDED FOR DRY EYES 10/17/19   Vera Romberg, PA-C   Fluticasone furoate-vilanterol (BREO ELLIPTA) 200-25 MCG/INH AEPB inhaler Inhale 1 puff into the lungs daily    Historical Provider, MD   Handicap Placard MISC by Does not apply route 6/27/19   Vera Romberg, PA-C   Incontinence Supply without lesions  Neck - supple, no significant adenopathy, carotids upstroke normal bilaterally, no bruits  Chest - Course breath sounds on the right lung field. Diminished breath sounds. Heart - normal rate, regular rhythm, normal S1, S2, no murmurs, rubs, clicks or gallops  Abdomen - soft, nontender, nondistended, no masses or organomegaly  Neurological - DTR's normal and symmetric, motor and sensory grossly normal bilaterally  Extremities - peripheral pulses normal, no pedal edema, no clubbing or cyanosis  Skin - normal coloration and turgor, no rashes, no suspicious skin lesions noted     DATA REVIEW     Medications: Current Inpatient  Scheduled Meds:   ferrous sulfate  325 mg Oral BID WC    vitamin C  500 mg Oral Daily    lipase-protease-amylase  36,000 Units Oral TID WC    budesonide-formoterol  2 puff Inhalation BID    albuterol  2 mg Oral BID    bumetanide  1 mg Oral Daily    cetirizine  10 mg Oral Daily    fluticasone  2 spray Nasal Daily    gabapentin  300 mg Oral TID    multivitamin  1 tablet Oral Daily    metoprolol succinate  25 mg Oral BID    pantoprazole  40 mg Oral Daily    [Held by provider] potassium chloride  20 mEq Oral BID    [Held by provider] rivaroxaban  20 mg Oral Daily    rOPINIRole  0.25 mg Oral BID    [Held by provider] spironolactone  25 mg Oral Daily    sodium chloride flush  10 mL Intravenous 2 times per day    ipratropium-albuterol  1 ampule Inhalation Q4H WA     Continuous Infusions:   sodium chloride 30 mL/hr at 10/15/20 1417     INPUT/OUTPUT:  In: 424 [P.O.:240; I.V.:351]  Out: 5 [Urine:420]  Date 10/17/20 0000 - 10/17/20 2359   Shift 1362-1724 2954-5707 0659-4769 24 Hour Total   INTAKE   I.V.(mL/kg) 351(3.6)   351(3.6)   Shift Total(mL/kg) 351(3.6)   351(3.6)   OUTPUT   Shift Total(mL/kg)       Weight (kg) 96.8 96.8 96.8 96.8       LABS:-  ABG:   No results for input(s): POCPH, POCPCO2, POCPO2, POCHCO3, TBLF6QKT in the last 72 hours.   CBC:   Recent Labs 4. Four-chamber cardiac enlargement/cardiomegaly. Coronary artery disease. 5. Subcarinal lymph node enlargement measuring up to 2.1 x 2.4 cm. Mildly   enlarged right hilar lymph node. 6. Cholelithiasis. 7. Postsurgical changes in association with the stomach. 8. Hypodense thyroid nodule measuring 2.5 x 1.3 cm. RECOMMENDATIONS:   2.5 cm incidental thyroid nodule. Recommend thyroid US. Reference: J Am Minda Radiol. 2015 Feb;12(2): 143-50         XR CHEST PORTABLE   Final Result   Multifocal infiltrate in the right lung and also likely in the left lung   base. This may represent an inflammatory process or infection. Pulmonary Function test:    Polysomnogram:    Echocardiogram:   No results found for this or any previous visit. Cardiac Catheterization:   No results found for this or any previous visit. ASSESSMENT AND PLAN     Assessment:    // Acute hypoxemic respiratory failure likely secondary to aspiration  // Aspiration pneumonia right upper and lower lobe  // History of Gastric outlet obstruction as complication of bariatric banding.   // Subcarinal and hilar adenopathy  // COPD   // Tobacco abuse  // MARIAMA  //  HTN  // HLD  // CHF     Plan:    1. Continue LABA/ICS  2. Continue Duoneb Q 4 times daily. 3. Completed 5 days of Unasyn. 4. Patient will need repeat imaging as outpatient for resolution. 5. Ordered BiPAP nightly. 6. As for the subcarinal lymphadenopathy, possibly related to repeated chronic aspiration. 7. On swallow study in 6/2020 patient had aspiration with thin liquids. However, repeat barium study shows no aspiration this admission. GI following for gastric outlet obstruction. EGD yesterday with narrow outlet. Recommending creon, repeat EGD in 1-2 days, and possible corrective surgery with bariatric surgery  8. REED, ANCA negative. Patient is still having persistent hemoptysis. Will discuss bronchoscopy with the attending.        I personally interviewed/examined the patient; reviewed interval history, interpreted all available radiographic and laboratory data at the time of service. Tawnya Osullivan MD  Internal Medicine Resident, PGY-3  [address] [place], [place]  10/17/2020, 7:43 AM       Attending Physician Statement  I have discussed the care of Israel Lamar, including pertinent history and exam findings,  with the resident. I have seen and examined the patient and the key elements of all parts of the encounter have been performed by me. I agree with the assessment, plan and orders as documented by the resident with additions . Hemoptysis due to aspiration pneumonitis and use of anticoagulants - resolved   Cont antibiotics for tt of aspiration pneumonia   Agree with GI - needs correction of distal esophogeal narrowing due to banding to dec risk of aspiration . Will need repeat ct chest w contrast in 1 month and follow up with Dr Ubaldo Jamil his pulmonologist     No plan for bronchoscopy at present     Treatment plan Discussed with nursing staff in detail , all questions answered . Electronically signed by Mirian Ramos MD on   10/17/20 at 4:16 PM EDT    Please note that this chart was generated using voice recognition Dragon dictation software. Although every effort was made to ensure the accuracy of this automated transcription, some errors in transcription may have occurred.

## 2020-10-19 ENCOUNTER — CARE COORDINATION (OUTPATIENT)
Dept: CASE MANAGEMENT | Age: 75
End: 2020-10-19

## 2020-10-19 NOTE — CARE COORDINATION
Cee [facility] Transitions Initial Follow Up Call    Call within 2 business days of discharge: Yes    Patient: Nelsy Grajeda Patient : 1945   MRN: [id number]  Reason for Admission: PNE, COVID 19 Monitoring  Discharge Date: 10/17/20 RARS: Readmission Risk Score: 19      Last Discharge [facility]       Complaint Diagnosis Description Type Department Provider    10/11/20 Shortness of Breath; Fever Pneumonia due to organism ED to Hosp-Admission (Discharged) (ADMITTED) [address] [facility], DO; Lindsay Rocha . .. Attempted initial 24 hour transitional call to patient. Left VM to return call directly to [phone]. 1st attempt.      Facility: Eran Crespo RN

## 2020-10-20 ENCOUNTER — CARE COORDINATION (OUTPATIENT)
Dept: CASE MANAGEMENT | Age: 75
End: 2020-10-20

## 2020-10-20 NOTE — CARE COORDINATION
Cee [facility] Transitions Initial Follow Up Call    Call within 2 business days of discharge: Yes    Patient: Elisa Cole Patient : 1945   MRN: [id number]  Reason for Admission: PNE, COVID 19 Monitoring  Discharge Date: 10/17/20 RARS: Readmission Risk Score: 19      Last Discharge [facility]       Complaint Diagnosis Description Type Department Provider    10/11/20 Shortness of Breath; Fever Pneumonia due to organism ED to Hosp-Admission (Discharged) (ADMITTED) [facility], ; Eliecer Love . .. Spoke with: Agnes Domingo    Facility: [facility]    Non-face-to-face services provided:  Scheduled appointment with PCP- with PCP  Scheduled appointment with Specialist-10/22 with pulmonary  Obtained and reviewed discharge summary and/or continuity of care documents     Spoke with patient who states he is feeing better. He denies any f/c, n/v, cough or other viral symptoms. Patient c/o 12 lb weight gain, has doubled up on his Bumex. I did tell him to call cardiology to move up his November appointment and let them know he was taking extra Bumex. [facility] home care nurse was out yesterday. He has all medications. PCP appointment set for , will see pulmonary on 10/22 and with cardiology on . Denies any needs or concerns. Challenges to be reviewed by the provider   Additional needs identified to be addressed with provider No  none    Discussed COVID-19 related testing which was available at this time. Test results were negative. Patient informed of results, if available? Yes         Method of communication with provider : none    Advance Care Planning:   Does patient have an Advance Directive:  reviewed and current. Was this a readmission? No  Patient stated reason for admission: PNE  Patients top risk factors for readmission: medical condition    Care Transition Nurse (CTN) contacted the patient by telephone to perform post hospital discharge assessment. call: Will follow with Loop  CTN provided contact information for future needs.           Care Transitions 24 Hour Call    Schedule Follow Up Appointment with PCP:  Completed  Do you have any ongoing symptoms?:  No  Do you have a copy of your discharge instructions?:  Yes  Do you have all of your prescriptions and are they filled?:  Yes  Have you been contacted by a [id number] [facility] Pharmacist?:  No  Have you scheduled your follow up appointment?:  Yes  Were you discharged with any Home Care or Post Acute Services:  Yes  Post Acute Services:  Home Health (Comment: Loree)  Patient DME:  Jeanne lucas, Other  Other Patient DME:  Raised toilet seat  Patient Home Equipment:  Nebulizer, CPAP  Do you have support at home?:  Child, Partner/Spouse/SO  Are you an active caregiver in your home?:  No  Care Transitions Interventions     Other Services:   (Comment: Call to PCP, attempt to arrange appointment within parameters for medical transport. )      Transportation Support:  Completed          Follow Up  Future Appointments   Date Time Provider Jeri Noonanisti   11/5/2020  1:00 PM Ryan Forman PA-C [address] [place] [address]   11/12/2020  7:30 AM Montserrat Arellano DO bariatric palma Jared Mitchell RN

## 2020-10-23 ENCOUNTER — HOSPITAL ENCOUNTER (OUTPATIENT)
Dept: GENERAL RADIOLOGY | Age: 75
Discharge: HOME | End: 2020-10-25
Payer: MEDICARE

## 2020-10-23 ENCOUNTER — HOSPITAL ENCOUNTER (OUTPATIENT)
Age: 75
Discharge: HOME | End: 2020-10-25
Payer: MEDICARE

## 2020-10-23 PROCEDURE — 71046 X-RAY EXAM CHEST 2 VIEWS: CPT

## 2020-10-26 RX ORDER — GUAIFENESIN 600 MG/1
600 TABLET, EXTENDED RELEASE ORAL 2 TIMES DAILY
Qty: 60 TABLET | Refills: 2 | Status: SHIPPED | OUTPATIENT
Start: 2020-10-26 | End: 2021-01-07

## 2020-10-26 RX ORDER — ASCORBIC ACID 500 MG
500 TABLET ORAL DAILY
Qty: 30 TABLET | Refills: 2 | Status: SHIPPED | OUTPATIENT
Start: 2020-10-26

## 2020-11-02 ENCOUNTER — HOSPITAL ENCOUNTER (OUTPATIENT)
Age: 75
Discharge: HOME | End: 2020-11-02
Payer: MEDICARE

## 2020-11-02 LAB
ANION GAP SERPL CALCULATED.3IONS-SCNC: 11 MMOL/L (ref 9–17)
BNP INTERPRETATION: ABNORMAL
BUN BLDV-MCNC: 15 MG/DL (ref 8–23)
BUN/CREAT BLD: ABNORMAL (ref 9–20)
CALCIUM SERPL-MCNC: 8.9 MG/DL (ref 8.6–10.4)
CHLORIDE BLD-SCNC: 103 MMOL/L (ref 98–107)
CO2: 29 MMOL/L (ref 20–31)
CREAT SERPL-MCNC: 0.85 MG/DL (ref 0.7–1.2)
GFR AFRICAN AMERICAN: >60 ML/MIN
GFR NON-AFRICAN AMERICAN: >60 ML/MIN
GFR SERPL CREATININE-BSD FRML MDRD: ABNORMAL ML/MIN/{1.73_M2}
GFR SERPL CREATININE-BSD FRML MDRD: ABNORMAL ML/MIN/{1.73_M2}
GLUCOSE BLD-MCNC: 106 MG/DL (ref 70–99)
HCT VFR BLD CALC: 33.5 % (ref 40.7–50.3)
HEMOGLOBIN: 10.1 G/DL (ref 13–17)
MCH RBC QN AUTO: 30.1 PG (ref 25.2–33.5)
MCHC RBC AUTO-ENTMCNC: 30.1 G/DL (ref 28.4–34.8)
MCV RBC AUTO: 100 FL (ref 82.6–102.9)
NRBC AUTOMATED: 0 PER 100 WBC
PDW BLD-RTO: 14.8 % (ref 11.8–14.4)
PLATELET # BLD: 184 K/UL (ref 138–453)
PMV BLD AUTO: 8.7 FL (ref 8.1–13.5)
POTASSIUM SERPL-SCNC: 3.8 MMOL/L (ref 3.7–5.3)
PRO-BNP: 3052 PG/ML
RBC # BLD: 3.35 M/UL (ref 4.21–5.77)
SODIUM BLD-SCNC: 143 MMOL/L (ref 135–144)
WBC # BLD: 4.8 K/UL (ref 3.5–11.3)

## 2020-11-02 PROCEDURE — 83880 ASSAY OF NATRIURETIC PEPTIDE: CPT

## 2020-11-02 PROCEDURE — 85027 COMPLETE CBC AUTOMATED: CPT

## 2020-11-02 PROCEDURE — 80048 BASIC METABOLIC PNL TOTAL CA: CPT

## 2020-11-02 PROCEDURE — 36415 COLL VENOUS BLD VENIPUNCTURE: CPT

## 2020-11-05 ENCOUNTER — OFFICE VISIT (OUTPATIENT)
Dept: PRIMARY CARE CLINIC | Age: 75
End: 2020-11-05
Payer: MEDICARE

## 2020-11-05 VITALS
HEART RATE: 77 BPM | BODY MASS INDEX: 31.88 KG/M2 | OXYGEN SATURATION: 97 % | WEIGHT: 222.2 LBS | DIASTOLIC BLOOD PRESSURE: 61 MMHG | SYSTOLIC BLOOD PRESSURE: 105 MMHG | TEMPERATURE: 96.6 F

## 2020-11-05 PROCEDURE — 99215 OFFICE O/P EST HI 40 MIN: CPT | Performed by: PHYSICIAN ASSISTANT

## 2020-11-05 PROCEDURE — G8484 FLU IMMUNIZE NO ADMIN: HCPCS | Performed by: PHYSICIAN ASSISTANT

## 2020-11-05 PROCEDURE — 4040F PNEUMOC VAC/ADMIN/RCVD: CPT | Performed by: PHYSICIAN ASSISTANT

## 2020-11-05 PROCEDURE — 1111F DSCHRG MED/CURRENT MED MERGE: CPT | Performed by: PHYSICIAN ASSISTANT

## 2020-11-05 PROCEDURE — G8417 CALC BMI ABV UP PARAM F/U: HCPCS | Performed by: PHYSICIAN ASSISTANT

## 2020-11-05 PROCEDURE — G8427 DOCREV CUR MEDS BY ELIG CLIN: HCPCS | Performed by: PHYSICIAN ASSISTANT

## 2020-11-05 PROCEDURE — 1036F TOBACCO NON-USER: CPT | Performed by: PHYSICIAN ASSISTANT

## 2020-11-05 PROCEDURE — 1123F ACP DISCUSS/DSCN MKR DOCD: CPT | Performed by: PHYSICIAN ASSISTANT

## 2020-11-05 PROCEDURE — 3017F COLORECTAL CA SCREEN DOC REV: CPT | Performed by: PHYSICIAN ASSISTANT

## 2020-11-05 RX ORDER — PANTOPRAZOLE SODIUM 40 MG/1
40 TABLET, DELAYED RELEASE ORAL DAILY
Qty: 28 TABLET | Refills: 3 | Status: SHIPPED | OUTPATIENT
Start: 2020-11-05 | End: 2021-02-03

## 2020-11-05 RX ORDER — ROPINIROLE 0.25 MG/1
0.25 TABLET, FILM COATED ORAL 2 TIMES DAILY
Qty: 56 TABLET | Refills: 3 | Status: SHIPPED | OUTPATIENT
Start: 2020-11-05 | End: 2021-02-03

## 2020-11-05 RX ORDER — FLUTICASONE PROPIONATE 50 MCG
2 SPRAY, SUSPENSION (ML) NASAL DAILY
Qty: 16 G | Refills: 3 | Status: SHIPPED | OUTPATIENT
Start: 2020-11-05 | End: 2021-08-09 | Stop reason: SDUPTHER

## 2020-11-05 RX ORDER — GABAPENTIN 300 MG/1
300 CAPSULE ORAL 3 TIMES DAILY
Qty: 84 CAPSULE | Refills: 2 | Status: SHIPPED | OUTPATIENT
Start: 2020-11-05 | End: 2021-01-07

## 2020-11-05 ASSESSMENT — ENCOUNTER SYMPTOMS
SHORTNESS OF BREATH: 1
CHEST TIGHTNESS: 1
COUGH: 1

## 2020-11-05 NOTE — PATIENT INSTRUCTIONS
· Start taking Metoprolol once daily on 11/12/2020 and continue until seen by cardiology on 11/19/2020  · Start taking Iron supplement twice daily

## 2020-11-05 NOTE — PROGRESS NOTES
Visit Information    Have you changed or started any medications since your last visit including any over-the-counter medicines, vitamins, or herbal medicines? no   Are you having any side effects from any of your medications? -  no  Have you stopped taking any of your medications? Is so, why? -  no    Have you seen any other physician or provider since your last visit? Yes - Records Obtained  Have you had any other diagnostic tests since your last visit? Yes - Records Obtained  Have you been seen in the emergency room and/or had an admission to a hospital since we last saw you? Yes - Records Obtained  Have you had your routine dental cleaning in the past 6 months? no    Have you activated your [employer] account? If not, what are your barriers?  Yes     Patient Care Team:  Sandie Morgan PA-C as PCP - General (Physician Assistant)  Sandie Morgan PA-C as PCP - [facility] [address], [place] as Consulting Physician (Gastroenterology)  Parul Gagnon MD as Consulting Physician (Cardiology)  Radha Mercado MD as Consulting Physician (Pulmonology)  Dima Pascal RN as Care Transitions Nurse    Medical History Review  Past Medical, Family, and Social History reviewed and does not contribute to the patient presenting condition    Health Maintenance   Topic Date Due    Shingles Vaccine (1 of 2) 10/31/1996    Annual Wellness Visit (AWV)  07/23/2020    Flu vaccine (1) 09/01/2020    Potassium monitoring  11/02/2021    Creatinine monitoring  11/02/2021    Colon cancer screen colonoscopy  03/26/2023    Lipid screen  06/24/2024    DTaP/Tdap/Td vaccine (2 - Td) 04/22/2029    Pneumococcal 65+ years Vaccine  Completed    AAA screen  Completed    Hepatitis C screen  Completed    Hepatitis A vaccine  Aged Out    Hepatitis B vaccine  Aged Out    Hib vaccine  Aged Out    Meningococcal (ACWY) vaccine  Aged Out

## 2020-11-05 NOTE — PROGRESS NOTES
Post-Discharge Transitional Care Management Services or Hospital Follow Up      Alix Kohler   YOB: 1945    Date of Office Visit:  11/5/2020  Date of Hospital Admission: 10/11/20  Date of Hospital Discharge: 10/17/20  Readmission Risk Score(high >=14%. Medium >=10%):Readmission Risk Score: 19      Care management risk score Rising risk (score 2-5) and Complex Care (Scores >=6): 7     Non face to face  following discharge, date last encounter closed (first attempt may have been earlier): *No documented post hospital discharge outreach found in the last 14 days *No documented post hospital discharge outreach found in the last 14 days    Call initiated 2 business days of discharge: *No response recorded in the last 14 days     Patient Active Problem List   Diagnosis    Atrial fibrillation ([facility]    Hyperlipidemia    GERD (gastroesophageal reflux disease)    Osteoarthritis of lumbar spine    OA (osteoarthritis) of knee, bilateral    Hallux valgus, acquired, bilateral    Hypertension    Slow transit constipation    MARIAMA on CPAP    Class 2 severe obesity due to excess calories with serious comorbidity and body mass index (BMI) of 36.0 to 36.9 in adult ([address]    Simple chronic bronchitis ([address]    Hospital-acquired bacterial pneumonia    Fluid overload    COPD (chronic obstructive pulmonary disease) ([address]    Chronic diastolic heart failure ([address]    Status post gastroplasty       Allergies   Allergen Reactions    Darvocet [Propoxyphene N-Acetaminophen] Hives    Other Hives    Oxycodone-Acetaminophen     Propoxyphene      Other reaction(s): Unknown       Medications listed as ordered at the time of discharge from hospital   [facility] Medication Instructions JOE:    Printed on:11/15/20 3263   Medication Information                      albuterol (PROVENTIL) 2 MG tablet  Use as needed             Armodafinil 200 MG TABS  Take 1 tablet by mouth 2 times daily. ascorbic acid (VITAMIN C) 500 MG tablet  Take 1 tablet by mouth daily             bumetanide (BUMEX) 1 MG tablet  TAKE 2 TABLETS BY MOUTH ON MONDAY, WEDNESDAY AND FRIDAY. TAKE 1 TABLET BY MOUTH ON ALL OTHER DAYS             cetirizine (ZYRTEC) 10 MG tablet  TAKE 1 TABLET BY MOUTH DAILY             D-Mannose 500 MG CAPS  Take 500 mg by mouth 3 times daily             ferrous sulfate (FE TABS 325) 325 (65 Fe) MG EC tablet  Take 1 tablet by mouth 2 times daily (with meals)             fluticasone (FLONASE) 50 MCG/ACT nasal spray  2 sprays by Nasal route daily             Fluticasone furoate-vilanterol (BREO ELLIPTA) 200-25 MCG/INH AEPB inhaler  Inhale 1 puff into the lungs daily             Foot Care Products (TRI-BALANCE ORTHOTICS MENS) MISC  Provide insurance covered orthotics shoes, wear daily             gabapentin (NEURONTIN) 300 MG capsule  Take 1 capsule by mouth 3 times daily for 90 days. guaiFENesin (SM MUCUS RELIEF) 600 MG extended release tablet  Take 1 tablet by mouth 2 times daily             Handicap Placard MISC  by Does not apply route             Incontinence Supply Disposable (INCONTINENCE BRIEF LARGE) MISC  To use as directed.  Use 3x a day             ipratropium-albuterol (DUONEB) 0.5-2.5 (3) MG/3ML SOLN nebulizer solution  Inhale 1 vial into the lungs every 4 hours as needed              lipase-protease-amylase (CREON) 08618-06982 units delayed release capsule  Take by mouth 3 times daily (with meals)             metoprolol succinate (TOPROL XL) 25 MG extended release tablet  Take 1 tablet by mouth 2 times daily             Multiple Vitamin (MULTI-VITAMINS) TABS  Take 1 tablet by mouth daily             pantoprazole (PROTONIX) 40 MG tablet  Take 1 tablet by mouth daily             polyethylene glycol (GLYCOLAX) powder  Take 17 g by mouth daily             potassium chloride (KLOR-CON M) 20 MEQ extended release tablet  Take 1 tablet by mouth 2 times daily             rivaroxaban (XARELTO) 20 MG TABS tablet  Take 1 tablet by mouth daily             rOPINIRole (REQUIP) 0.25 MG tablet  Take 1 tablet by mouth 2 times daily             SM LUBRICANT EYE DROPS 0.4-0.3 % ophthalmic solution  PLACE 1 DROP INTO BOTH EYES FOUR TIMES DAILY AS NEEDED FOR DRY EYES             tiZANidine (ZANAFLEX) 4 MG tablet  TAKE 1 TABLET BY MOUTH DAILY AS NEEDED             vitamin B-12 (CYANOCOBALAMIN) 100 MCG tablet  TAKE 1 TABLET BY MOUTH DAILY AS NEEDED FOR FATIGUE                   Medications marked \"taking\" at this time  Outpatient Medications Marked as Taking for the 11/5/20 encounter (Office Visit) with Ryan Forman PA-C   Medication Sig Dispense Refill    gabapentin (NEURONTIN) 300 MG capsule Take 1 capsule by mouth 3 times daily for 90 days. 84 capsule 2    rOPINIRole (REQUIP) 0.25 MG tablet Take 1 tablet by mouth 2 times daily 56 tablet 3    fluticasone (FLONASE) 50 MCG/ACT nasal spray 2 sprays by Nasal route daily 16 g 3    pantoprazole (PROTONIX) 40 MG tablet Take 1 tablet by mouth daily 28 tablet 3    rivaroxaban (XARELTO) 20 MG TABS tablet Take 1 tablet by mouth daily 28 tablet 3    lipase-protease-amylase (CREON) 95758-97541 units delayed release capsule Take by mouth 3 times daily (with meals) 180 capsule 1    guaiFENesin (SM MUCUS RELIEF) 600 MG extended release tablet Take 1 tablet by mouth 2 times daily 60 tablet 2    ascorbic acid (VITAMIN C) 500 MG tablet Take 1 tablet by mouth daily 30 tablet 2    ferrous sulfate (FE TABS 325) 325 (65 Fe) MG EC tablet Take 1 tablet by mouth 2 times daily (with meals) 90 tablet 3    tiZANidine (ZANAFLEX) 4 MG tablet TAKE 1 TABLET BY MOUTH DAILY AS NEEDED 28 tablet 2    bumetanide (BUMEX) 1 MG tablet TAKE 2 TABLETS BY MOUTH ON MONDAY, WEDNESDAY AND FRIDAY.  TAKE 1 TABLET BY MOUTH ON ALL OTHER DAYS 40 tablet 2    vitamin B-12 (CYANOCOBALAMIN) 100 MCG tablet TAKE 1 TABLET BY MOUTH DAILY AS NEEDED FOR FATIGUE 30 tablet 5    metoprolol succinate (TOPROL XL) 25 MG extended release tablet Take 1 tablet by mouth 2 times daily 60 tablet 3    potassium chloride (KLOR-CON M) 20 MEQ extended release tablet Take 1 tablet by mouth 2 times daily 56 tablet 5    Multiple Vitamin (MULTI-VITAMINS) TABS Take 1 tablet by mouth daily 28 tablet 5    [DISCONTINUED] cetirizine (ZYRTEC) 10 MG tablet TAKE 1 TABLET BY MOUTH DAILY 28 tablet 5    D-Mannose 500 MG CAPS Take 500 mg by mouth 3 times daily 90 capsule 11    SM LUBRICANT EYE DROPS 0.4-0.3 % ophthalmic solution PLACE 1 DROP INTO BOTH EYES FOUR TIMES DAILY AS NEEDED FOR DRY EYES 15 mL 3    Fluticasone furoate-vilanterol (BREO ELLIPTA) 200-25 MCG/INH AEPB inhaler Inhale 1 puff into the lungs daily      Handicap Placard MISC by Does not apply route 1 each 0    Incontinence Supply Disposable (INCONTINENCE BRIEF LARGE) MISC To use as directed. Use 3x a day 90 each 3    polyethylene glycol (GLYCOLAX) powder Take 17 g by mouth daily 527 g 3    Foot Care Products (TRI-BALANCE ORTHOTICS MENS) MISC Provide insurance covered orthotics shoes, wear daily 2 each 0    albuterol (PROVENTIL) 2 MG tablet Use as needed 1 tablet 0    ipratropium-albuterol (DUONEB) 0.5-2.5 (3) MG/3ML SOLN nebulizer solution Inhale 1 vial into the lungs every 4 hours as needed       Armodafinil 200 MG TABS Take 1 tablet by mouth 2 times daily.            Medications patient taking as of now reconciled against medications ordered at time of hospital discharge: Yes    Chief Complaint   Patient presents with    Follow-Up from Hospital     Lab review       Per Discharge Note:    Reyna Kirk is a 60-year-old male who presented to the hospital complaining of shortness of breath and generalized weakness.  Patient reported that he had been feeling unwell for approximately 1 week. Sherif Solizjac states that he had a uncontrollable coughing episode while his CPAP was on which subsequently led to vomiting and aspiration.  Following that episode, he states that he developed a fever.  In the emergency department he underwent CT scan of the chest which suggested multifocal multiple pneumonia, right greater than left.  He was started on Unasyn for presumed aspiration pneumonia.  Cardiology was consulted regarding elevated troponin which appears to be chronic.     Pt treated for aspiration pneumonia with 5 day course of unasyn. He has history of recurrent aspiration pneumonia. This is his second hospitalization for this issue within the past several months. Repeat swallow study here did not reveal any aspiration. GI was consulted regarding known esophageal stenosis which has previously required balloon dilatation. He underwent EGD which showed a normal esophagus with evidence of vertical banded gastroplasty with a narrow outlet measuring 10 mm in diameter also showed a large amount of food bezoar in the gastric pouch interfering with visualization. There is a staple line from prior gastroplasty was intact. Pulmonology consulted regarding episodes of hemoptysis and subcarinal lymph node enlargement, hilar lymph node enlargement. Thought to reactive. Repeat imaging will need to be repeated in the outpatient setting. HPI    Inpatient course: Discharge summary reviewed- see chart. Interval history/Current status: Pt is here today for hospital follow-up for pneumonia, pancreatic insufficiency, and heart failure. Patient wife is present. Patient states he is compliant with medications. Patient was admitted to hospital for pneumonia and heart failure concerns in 10/2020, patient was hospitalized for 6 days where his symptoms improved and he was discharged. While patient was admitted, patient did have a EGD completed. While admitted patient was started on iron for anemia, patient was prescribed iron twice daily, \"only taking once daily\". Encourage patient to take medication as prescribed to help blood count improve anemia, patient voiced understanding.     Patient has follow-up with pulmonology for the pneumonia on 11/4/2020, \"pulmonology believed fluid on lungs so Bumex was increased\". Patient states \"will be following up with pulmonology in 2 weeks. Informed patient we will request most recent pulmonology office note. Patient voiced \"fogginess\" when he takes metoprolol, \"noticed when he does not take metoprolol it does not happen\". Patient is scheduled to follow-up with cardiology on 11/19/2020. Instructed patient start taking metoprolol only once daily starting on 11/12/2020 and follow-up with cardiology to discuss possible medication adjustment, patient voiced understanding. Patient blood pressures controlled in office. Patient weight is stable. Discussed weight loss in depth with patient, encourage patient make changes in diet. Patient requesting medication refills for GERD, RLS, neuropathy, nasal congestion, A. fib, patient denies any side effect of medication. Labs reviewed, patient had labs completed on 11/2/2020 that did show anemia and BNP elevated. Health maintenance reviewed. OARRS reviewed, prescribed Gabapentin 300 mg 3 times daily for 90 days. Medication reviewed, refilled Flonase 50 mcg nasal spray, Creon 24,000-76,000 capsule, Protonix 40 mg, Xarelto 20 mg, Requip 0.25 mg. Review of Systems   Constitutional: Negative for chills and fever. HENT: Negative for congestion. Respiratory: Positive for cough (non-productive), chest tightness and shortness of breath (on exertion). Negative for wheezing. Cardiovascular: Negative for chest pain. Gastrointestinal: Negative for constipation, diarrhea, nausea and vomiting. Genitourinary: Negative for dysuria, frequency and urgency. Musculoskeletal: Negative for back pain, myalgias and neck pain. Neurological: Negative for headaches.        Vitals:    11/05/20 1310   BP: 105/61   Site: Right Upper Arm   Position: Sitting   Cuff Size: Medium Adult   Pulse: 77   Temp: 96.6 °F (35.9 °C)   TempSrc: Temporal   SpO2: 97%   Weight: 222 lb 3.2 oz (100.8 kg)     Body mass index is 31.88 kg/m². Wt Readings from Last 3 Encounters:   11/12/20 228 lb (103.4 kg)   11/05/20 222 lb 3.2 oz (100.8 kg)   10/17/20 213 lb 4.8 oz (96.8 kg)     BP Readings from Last 3 Encounters:   11/12/20 104/64   11/05/20 105/61   10/17/20 (!) 98/59       Physical Exam  Vitals signs reviewed. Constitutional:       Appearance: Normal appearance. He is well-developed and well-groomed. He is obese. HENT:      Head: Normocephalic and atraumatic. Right Ear: External ear normal.      Left Ear: External ear normal.      Nose: Nose normal.   Eyes:      General: Lids are normal.      Conjunctiva/sclera: Conjunctivae normal.      Pupils: Pupils are equal, round, and reactive to light. Cardiovascular:      Rate and Rhythm: Normal rate and regular rhythm. Heart sounds: Normal heart sounds. Pulmonary:      Effort: Pulmonary effort is normal.      Breath sounds: Normal breath sounds. Abdominal:      Palpations: Abdomen is soft. There is no mass. Tenderness: There is generalized abdominal tenderness. Musculoskeletal:         General: No tenderness. Right lower leg: Edema present. Left lower leg: Edema present. Skin:     General: Skin is warm and dry. Neurological:      Mental Status: He is alert and oriented to person, place, and time. Psychiatric:         Behavior: Behavior is cooperative. Assessment/Plan:    Marybeth Golden was seen today for follow-up from hospital.    Diagnoses and all orders for this visit:    Pneumonia of right lung due to infectious organism, unspecified part of lung  -     PA DISCHARGE MEDS RECONCILED W/ CURRENT OUTPATIENT MED LIST    Pancreatic insufficiency  -     lipase-protease-amylase (CREON) 51357-32739 units delayed release capsule;  Take by mouth 3 times daily (with meals)  -     PA DISCHARGE MEDS RECONCILED W/ CURRENT OUTPATIENT MED LIST    Chronic diastolic heart failure (HCC)  -     RI DISCHARGE MEDS RECONCILED W/ CURRENT OUTPATIENT MED LIST    Class 2 severe obesity due to excess calories with serious comorbidity and body mass index (BMI) of 35.0 to 35.9 in adult [facility])    Medication refill  -     gabapentin (NEURONTIN) 300 MG capsule; Take 1 capsule by mouth 3 times daily for 90 days. -     rOPINIRole (REQUIP) 0.25 MG tablet; Take 1 tablet by mouth 2 times daily  -     fluticasone (FLONASE) 50 MCG/ACT nasal spray; 2 sprays by Nasal route daily  -     pantoprazole (PROTONIX) 40 MG tablet; Take 1 tablet by mouth daily  -     rivaroxaban (XARELTO) 20 MG TABS tablet; Take 1 tablet by mouth daily  -     lipase-protease-amylase (CREON) 46691-49301 units delayed release capsule; Take by mouth 3 times daily (with meals)    Paroxysmal atrial fibrillation (HCC)  -     rivaroxaban (XARELTO) 20 MG TABS tablet; Take 1 tablet by mouth daily    Gastroesophageal reflux disease without esophagitis  -     pantoprazole (PROTONIX) 40 MG tablet; Take 1 tablet by mouth daily    Nasal congestion  -     fluticasone (FLONASE) 50 MCG/ACT nasal spray; 2 sprays by Nasal route daily    RLS (restless legs syndrome)  -     rOPINIRole (REQUIP) 0.25 MG tablet; Take 1 tablet by mouth 2 times daily    Neuropathy  -     gabapentin (NEURONTIN) 300 MG capsule; Take 1 capsule by mouth 3 times daily for 90 days.           Medical Decision Making: low complexity

## 2020-11-12 ENCOUNTER — OFFICE VISIT (OUTPATIENT)
Dept: BARIATRICS/WEIGHT MGMT | Age: 75
End: 2020-11-12
Payer: MEDICARE

## 2020-11-12 ENCOUNTER — HOSPITAL ENCOUNTER (OUTPATIENT)
Age: 75
Discharge: HOME | End: 2020-11-12
Payer: MEDICARE

## 2020-11-12 VITALS
TEMPERATURE: 96.9 F | WEIGHT: 228 LBS | DIASTOLIC BLOOD PRESSURE: 64 MMHG | HEIGHT: 67 IN | BODY MASS INDEX: 35.79 KG/M2 | HEART RATE: 68 BPM | SYSTOLIC BLOOD PRESSURE: 104 MMHG

## 2020-11-12 LAB
ANION GAP SERPL CALCULATED.3IONS-SCNC: 11 MMOL/L (ref 9–17)
BNP INTERPRETATION: ABNORMAL
BUN BLDV-MCNC: 13 MG/DL (ref 8–23)
BUN/CREAT BLD: NORMAL (ref 9–20)
CALCIUM SERPL-MCNC: 8.9 MG/DL (ref 8.6–10.4)
CHLORIDE BLD-SCNC: 102 MMOL/L (ref 98–107)
CO2: 28 MMOL/L (ref 20–31)
CREAT SERPL-MCNC: 0.79 MG/DL (ref 0.7–1.2)
GFR AFRICAN AMERICAN: >60 ML/MIN
GFR NON-AFRICAN AMERICAN: >60 ML/MIN
GFR SERPL CREATININE-BSD FRML MDRD: NORMAL ML/MIN/{1.73_M2}
GFR SERPL CREATININE-BSD FRML MDRD: NORMAL ML/MIN/{1.73_M2}
GLUCOSE BLD-MCNC: 86 MG/DL (ref 70–99)
POTASSIUM SERPL-SCNC: 3.9 MMOL/L (ref 3.7–5.3)
PRO-BNP: 3351 PG/ML
SODIUM BLD-SCNC: 141 MMOL/L (ref 135–144)

## 2020-11-12 PROCEDURE — G8484 FLU IMMUNIZE NO ADMIN: HCPCS | Performed by: SURGERY

## 2020-11-12 PROCEDURE — 36415 COLL VENOUS BLD VENIPUNCTURE: CPT

## 2020-11-12 PROCEDURE — 1111F DSCHRG MED/CURRENT MED MERGE: CPT | Performed by: SURGERY

## 2020-11-12 PROCEDURE — 1036F TOBACCO NON-USER: CPT | Performed by: SURGERY

## 2020-11-12 PROCEDURE — G8427 DOCREV CUR MEDS BY ELIG CLIN: HCPCS | Performed by: SURGERY

## 2020-11-12 PROCEDURE — 83880 ASSAY OF NATRIURETIC PEPTIDE: CPT

## 2020-11-12 PROCEDURE — 1123F ACP DISCUSS/DSCN MKR DOCD: CPT | Performed by: SURGERY

## 2020-11-12 PROCEDURE — 99213 OFFICE O/P EST LOW 20 MIN: CPT | Performed by: SURGERY

## 2020-11-12 PROCEDURE — 3017F COLORECTAL CA SCREEN DOC REV: CPT | Performed by: SURGERY

## 2020-11-12 PROCEDURE — G8417 CALC BMI ABV UP PARAM F/U: HCPCS | Performed by: SURGERY

## 2020-11-12 PROCEDURE — 80048 BASIC METABOLIC PNL TOTAL CA: CPT

## 2020-11-12 PROCEDURE — 4040F PNEUMOC VAC/ADMIN/RCVD: CPT | Performed by: SURGERY

## 2020-11-12 RX ORDER — CETIRIZINE HYDROCHLORIDE 10 MG/1
10 TABLET ORAL DAILY
Qty: 28 TABLET | Refills: 5 | Status: SHIPPED
Start: 2020-11-12 | End: 2021-05-13

## 2020-11-12 NOTE — TELEPHONE ENCOUNTER
Health Maintenance   Topic Date Due    Shingles Vaccine (1 of 2) 10/31/1996    Annual Wellness Visit (AWV)  07/23/2020    Flu vaccine (1) 11/05/2021 (Originally 9/1/2020)    Potassium monitoring  11/02/2021    Creatinine monitoring  11/02/2021    Colon cancer screen colonoscopy  03/26/2023    Lipid screen  06/24/2024    DTaP/Tdap/Td vaccine (2 - Td) 04/22/2029    Pneumococcal 65+ years Vaccine  Completed    AAA screen  Completed    Hepatitis C screen  Completed    Hepatitis A vaccine  Aged Out    Hepatitis B vaccine  Aged Out    Hib vaccine  Aged Out    Meningococcal (ACWY) vaccine  Aged Out             (applicable per patient's age: Cancer Screenings, Depression Screening, Fall Risk Screening, Immunizations)    LDL Cholesterol (mg/dL)   Date Value   06/24/2019 65     LDL Calculated (mg/dL)   Date Value   10/30/2013 67     AST (U/L)   Date Value   10/11/2020 19     ALT (U/L)   Date Value   10/11/2020 9     BUN (mg/dL)   Date Value   11/02/2020 15      (goal A1C is < 7)   (goal LDL is <100) need 30-50% reduction from baseline     BP Readings from Last 3 Encounters:   11/12/20 104/64   11/05/20 105/61   10/17/20 (!) 98/59    (goal /80)      All Future Testing planned in [facility]:  Lab Frequency Next Occurrence       Next Visit Date:  No future appointments.          Patient Active Problem List:     Atrial fibrillation (HCC)     Hyperlipidemia     GERD (gastroesophageal reflux disease)     Osteoarthritis of lumbar spine     OA (osteoarthritis) of knee, bilateral     Hallux valgus, acquired, bilateral     Hypertension     Slow transit constipation     MARIAMA on CPAP     Class 2 severe obesity due to excess calories with serious comorbidity and body mass index (BMI) of 36.0 to 36.9 in adult [facility])     Simple chronic bronchitis (HCC)     Hospital-acquired bacterial pneumonia     Fluid overload     COPD (chronic obstructive pulmonary disease) (HCC)     Chronic diastolic heart failure ([facility]     Status post gastroplasty

## 2020-11-15 ENCOUNTER — TELEPHONE (OUTPATIENT)
Dept: PRIMARY CARE CLINIC | Age: 75
End: 2020-11-15

## 2020-11-15 ASSESSMENT — ENCOUNTER SYMPTOMS
VOMITING: 0
CONSTIPATION: 0
DIARRHEA: 0
WHEEZING: 0
NAUSEA: 0
BACK PAIN: 0

## 2020-11-22 NOTE — PROGRESS NOTES
[facility]  [facility]  [address] [place] [address] [place]  [address] [facility] [address]  Dept: [phone]    11/12/2020    CC: Follow up from EGD and Nausea    History:  77year old male who underwent vertical banded gastroplasty over 20 years ago. He presents for follow up after recent hospital admission for gastric outlet obstruction where he had a dilation. He is on PPI. He denies nausea today or abdominal pain. Tolerating a soft diet today. He denies melena or hematochezia. He presents today for further follow up. Does not want surgery at this time.   He would like to go back to Dr. Boubacar Marie for dilation    Review of Systems:    General  Negative for: [] Weight Change   [x] Fatigue   [x] Fevers & Chills    [] Appetite change [] Other:    Positive for: [] Weight Change   [] Fatigue   [] Fevers & Chills    [] Appetite change [] Other:   Cardiac  Negative for: [x] Chest Pain   [x] Difficulty Breathing   [] Leg Cramps [x] Edema of Lower Extremeties    [] Left   [] Right      Positive for: [] Chest Pain   [] Difficulty Breathing   [] Leg Cramps [] Edema of Lower Extremeties    [] Left   [] Right   Pulmonary  Negative for: [x] Shortness of Breath [x] Wheeze [x] Cough  [] Calf Pain     Positive for: [] Shortness of Breath [] Wheeze [] Cough  [] Calf Pain   Gastro-Intestinal Negative for: [x] Heartburn   [x] Reflux   [x] Dysphagia   [x] Melena   [] BRBPR  [x] Vomiting   [x] Abdominal Pain   [x] Diarrhea  [x] Hernia    [] Constipation  [] Other:     Positive for: [] Heartburn   [] Reflux   [] Dysphagia   [] Melena   [] BRBPR  [] Vomiting   [] Abdominal Pain   [] Diarrhea  [] Hernia    [] Constipation  [] Other:    Muskuloskeletal Negative for: [] Joint pain   [] Back pain   [] Knee Pain   [x] Muscle weakness   [x] Edema [] Other:     Positive for: [x] Joint pain   [] Back pain   [] Knee Pain   [] Muscle weakness  [] Edema [] Other:    Neurologic Negative for: [x] Syncope   [x] Insomnia   [] Being treated for depression  [] Other:     Positive for: [] Syncope   [] Insomnia   [x] Being treated for depression  [] Other:    Skin Negative for: [x] Wound:   [] Open   [] Draining   [] Incisional     [x] Rash   [] Hair Loss  [] Other:     Positive for: [] Wound:   [] Open   [] Draining    [] Incisional  [] Rash   [] Hair Loss  [] Other:          Physical Exam:  /64   Pulse 68   Temp 96.9 °F (36.1 °C)   Ht 5' 7\" (1.702 m)   Wt 228 lb (103.4 kg)   BMI 35.71 kg/m²   Constitutional:  Vital signs are normal. The patient appears well-developed and well-nourished. HEENT:   Head: Normocephalic. Atraumatic  Eyes: pupils are equal and reactive. No scleral icterus is present. Neck: No mass and no thyromegaly present. Cardiovascular: Normal rate, regular rhythm, S1 normal and S2 normal.  Radial pulses present   Pulmonary/Chest: Effort normal and breath sounds normal. No retractions  Abdominal: Soft. Normal appearance. There is no organomegaly. No tenderness. There is no rigidity, no rebound, no guarding and no Diaz's sign. Musculoskeletal:        Right lower leg: Normal. No tenderness and no edema. No cervical adenopathy, No Exrtemity Adenopathy. Neurological: The patient is alert and oriented. Moving all 4 extremities, sensation grossly intact bilateral  Skin: Skin is warm, dry and intact. Psychiatric: The patient has a normal mood and affect. Speech is normal and behavior is normal. Judgment and thought content normal. Cognition and memory are normal.     Assessment:  GERD secondary to prior bariatric surgery  Status post dilation of VBG  Gastritis  Tolerating soft diet at this time. Does not want surgery today    Plan:  PPI daily  Reglan daily PRN  Follow up in 3 months prn  Does not want surgery, feels he wants to go with conservative measures as long as possible.   Referral to Dr. Thirza Bence for dilation, this helped last time and he wants to hold off any surgery

## 2020-11-23 ENCOUNTER — TELEPHONE (OUTPATIENT)
Dept: PRIMARY CARE CLINIC | Age: 75
End: 2020-11-23

## 2020-12-03 ENCOUNTER — OFFICE VISIT (OUTPATIENT)
Dept: PRIMARY CARE CLINIC | Age: 75
End: 2020-12-03
Payer: MEDICARE

## 2020-12-03 ENCOUNTER — HOSPITAL ENCOUNTER (OUTPATIENT)
Age: 75
Setting detail: SPECIMEN
Discharge: HOME | End: 2020-12-03
Payer: MEDICARE

## 2020-12-03 VITALS
TEMPERATURE: 97.5 F | SYSTOLIC BLOOD PRESSURE: 96 MMHG | BODY MASS INDEX: 32.42 KG/M2 | HEART RATE: 59 BPM | OXYGEN SATURATION: 97 % | DIASTOLIC BLOOD PRESSURE: 55 MMHG | WEIGHT: 207 LBS

## 2020-12-03 LAB
BILIRUBIN, POC: NEGATIVE
BLOOD URINE, POC: ABNORMAL
CLARITY, POC: CLEAR
COLOR, POC: YELLOW
GLUCOSE URINE, POC: NEGATIVE
KETONES, POC: NEGATIVE
LEUKOCYTE EST, POC: NEGATIVE
NITRITE, POC: ABNORMAL
PH, POC: 7
PROTEIN, POC: NEGATIVE
SPECIFIC GRAVITY, POC: 1.01
UROBILINOGEN, POC: NEGATIVE

## 2020-12-03 PROCEDURE — 81002 URINALYSIS NONAUTO W/O SCOPE: CPT | Performed by: PHYSICIAN ASSISTANT

## 2020-12-03 PROCEDURE — 99495 TRANSJ CARE MGMT MOD F2F 14D: CPT | Performed by: PHYSICIAN ASSISTANT

## 2020-12-03 PROCEDURE — 1111F DSCHRG MED/CURRENT MED MERGE: CPT | Performed by: PHYSICIAN ASSISTANT

## 2020-12-03 RX ORDER — METOPROLOL SUCCINATE 25 MG/1
25 TABLET, EXTENDED RELEASE ORAL 2 TIMES DAILY
Qty: 60 TABLET | Refills: 2 | Status: SHIPPED | OUTPATIENT
Start: 2020-12-03 | End: 2021-03-03

## 2020-12-03 RX ORDER — POTASSIUM CHLORIDE 20 MEQ/1
20 TABLET, EXTENDED RELEASE ORAL 2 TIMES DAILY
Qty: 56 TABLET | Refills: 5 | Status: SHIPPED | OUTPATIENT
Start: 2020-12-03 | End: 2021-06-30 | Stop reason: SDUPTHER

## 2020-12-03 RX ORDER — MIDODRINE HYDROCHLORIDE 5 MG/1
TABLET ORAL
Qty: 90 TABLET | Refills: 1 | Status: SHIPPED | OUTPATIENT
Start: 2020-12-03 | End: 2021-03-12

## 2020-12-03 RX ORDER — BUMETANIDE 2 MG/1
TABLET ORAL
Qty: 60 TABLET | Refills: 2 | Status: SHIPPED | OUTPATIENT
Start: 2020-12-03 | End: 2020-12-15 | Stop reason: DRUGHIGH

## 2020-12-03 RX ORDER — BUMETANIDE 2 MG/1
2 TABLET ORAL 2 TIMES DAILY
COMMUNITY
Start: 2020-11-23 | End: 2020-12-03 | Stop reason: SDUPTHER

## 2020-12-03 RX ORDER — DIPHENOXYLATE HYDROCHLORIDE AND ATROPINE SULFATE 2.5; .025 MG/1; MG/1
1 TABLET ORAL DAILY
Qty: 28 TABLET | Refills: 5 | Status: SHIPPED | OUTPATIENT
Start: 2020-12-03 | End: 2021-06-30 | Stop reason: SDUPTHER

## 2020-12-03 RX ORDER — AMOXICILLIN AND CLAVULANATE POTASSIUM 500; 125 MG/1; MG/1
1 TABLET, FILM COATED ORAL 2 TIMES DAILY
Qty: 14 TABLET | Refills: 0 | Status: SHIPPED | OUTPATIENT
Start: 2020-12-03 | End: 2020-12-10

## 2020-12-03 RX ORDER — MIDODRINE HYDROCHLORIDE 5 MG/1
5 TABLET ORAL 3 TIMES DAILY
COMMUNITY
Start: 2020-11-23 | End: 2020-12-03 | Stop reason: SDUPTHER

## 2020-12-03 ASSESSMENT — ENCOUNTER SYMPTOMS
SHORTNESS OF BREATH: 1
NAUSEA: 0
COUGH: 1
SINUS COMPLAINT: 1
SINUS PRESSURE: 1
VOMITING: 0
SORE THROAT: 0

## 2020-12-03 NOTE — PROGRESS NOTES
Visit Information    Have you changed or started any medications since your last visit including any over-the-counter medicines, vitamins, or herbal medicines? no   Are you having any side effects from any of your medications? -  no  Have you stopped taking any of your medications? Is so, why? -  no    Have you seen any other physician or provider since your last visit? No  Have you had any other diagnostic tests since your last visit? Yes - Records Obtained  Have you been seen in the emergency room and/or had an admission to a hospital since we last saw you? Yes - Records Obtained  Have you had your routine dental cleaning in the past 6 months? no    Have you activated your [employer] account? If not, what are your barriers?  Yes     Patient Care Team:  Alvina Petty PA-C as PCP - General (Physician Assistant)  Alvina Petty PA-C as PCP - [facility] [id number] [facility] MD [place] as Consulting Physician (Gastroenterology)  Zhen Diaz MD as Consulting Physician (Cardiology)  Shayne Severin, MD as Consulting Physician (Pulmonology)    Medical History Review  Past Medical, Family, and Social History reviewed and does contribute to the patient presenting condition    Health Maintenance   Topic Date Due    Shingles Vaccine (1 of 2) 10/31/1996    Annual Wellness Visit (AWV)  07/23/2020    Flu vaccine (1) 11/05/2021 (Originally 9/1/2020)    Potassium monitoring  11/12/2021    Creatinine monitoring  11/12/2021    Colon cancer screen colonoscopy  03/26/2023    Lipid screen  06/24/2024    DTaP/Tdap/Td vaccine (2 - Td) 04/22/2029    Pneumococcal 65+ years Vaccine  Completed    AAA screen  Completed    Hepatitis C screen  Completed    Hepatitis A vaccine  Aged Out    Hepatitis B vaccine  Aged Out    Hib vaccine  Aged Out    Meningococcal (ACWY) vaccine  Aged Out

## 2020-12-03 NOTE — PROGRESS NOTES
Post-Discharge Transitional Care Management Services or Hospital Follow Up      Yvon Stevenson   YOB: 1945    Date of Office Visit:  12/3/2020  Date of Hospital Admission: 10/11/20  Date of Hospital Discharge: 10/17/20  Readmission Risk Score(high >=14%.  Medium >=10%):Readmission Risk Score: 19      Care management risk score Rising risk (score 2-5) and Complex Care (Scores >=6): 7     Non face to face  following discharge, date last encounter closed (first attempt may have been earlier): *No documented post hospital discharge outreach found in the last 14 days *No documented post hospital discharge outreach found in the last 14 days    Call initiated 2 business days of discharge: *No response recorded in the last 14 days     Patient Active Problem List   Diagnosis    Atrial fibrillation ([facility] [address]    Hyperlipidemia    GERD (gastroesophageal reflux disease)    Osteoarthritis of lumbar spine    OA (osteoarthritis) of knee, bilateral    Hallux valgus, acquired, bilateral    Hypertension    Slow transit constipation    MARIAMA on CPAP    Class 2 severe obesity due to excess calories with serious comorbidity and body mass index (BMI) of 36.0 to 36.9 in adult ([address]    Simple chronic bronchitis ([address]    Hospital-acquired bacterial pneumonia    Fluid overload    COPD (chronic obstructive pulmonary disease) ([facility] [address]    Chronic diastolic heart failure ([facility] [address]    Status post gastroplasty       Allergies   Allergen Reactions    Darvocet [Propoxyphene N-Acetaminophen] Hives    Other Hives    Oxycodone-Acetaminophen     Propoxyphene      Other reaction(s): Unknown       Medications listed as ordered at the time of discharge from hospital   Margarette Rangel   [facility] Medication Instructions JOE:    Printed on:12/06/20 1915   Medication Information                      albuterol (PROVENTIL) 2 MG tablet  Use as needed             amoxicillin-clavulanate (AUGMENTIN) 500-125 MG per tablet  Take 1 tablet by mouth 2 times daily for 7 days             Armodafinil 200 MG TABS  Take 1 tablet by mouth 2 times daily. ascorbic acid (VITAMIN C) 500 MG tablet  Take 1 tablet by mouth daily             bumetanide (BUMEX) 2 MG tablet  Take 2 mg of Bumex in morning, 1 mg of Bumex in afternoon, take 1 mg in evening if weight is >3 above baseline weight             cetirizine (ZYRTEC) 10 MG tablet  TAKE 1 TABLET BY MOUTH DAILY             D-Mannose 500 MG CAPS  Take 500 mg by mouth 3 times daily             ferrous sulfate (FE TABS 325) 325 (65 Fe) MG EC tablet  Take 1 tablet by mouth 2 times daily (with meals)             fluticasone (FLONASE) 50 MCG/ACT nasal spray  2 sprays by Nasal route daily             Fluticasone furoate-vilanterol (BREO ELLIPTA) 200-25 MCG/INH AEPB inhaler  Inhale 1 puff into the lungs daily             Foot Care Products (TRI-BALANCE ORTHOTICS MENS) MISC  Provide insurance covered orthotics shoes, wear daily             gabapentin (NEURONTIN) 300 MG capsule  Take 1 capsule by mouth 3 times daily for 90 days. guaiFENesin (SM MUCUS RELIEF) 600 MG extended release tablet  Take 1 tablet by mouth 2 times daily             Handicap Placard MISC  by Does not apply route             Incontinence Supply Disposable (INCONTINENCE BRIEF LARGE) MISC  To use as directed.  Use 3x a day             ipratropium-albuterol (DUONEB) 0.5-2.5 (3) MG/3ML SOLN nebulizer solution  Inhale 1 vial into the lungs every 4 hours as needed              lipase-protease-amylase (CREON) 45280-18206 units delayed release capsule  Take by mouth 3 times daily (with meals)             metoprolol succinate (TOPROL XL) 25 MG extended release tablet  Take 1 tablet by mouth 2 times daily DO NOT TAKE IF SBP <100 OR HR<60             midodrine (PROAMATINE) 5 MG tablet  Take 1 tablet three times daily if SBP <120             Multiple Vitamin (MULTI-VITAMINS) TABS  Take 1 tablet by mouth daily             pantoprazole (PROTONIX) 40 MG tablet  Take 1 tablet by mouth daily             polyethylene glycol (GLYCOLAX) powder  Take 17 g by mouth daily             potassium chloride (KLOR-CON M) 20 MEQ extended release tablet  Take 1 tablet by mouth 2 times daily             rivaroxaban (XARELTO) 20 MG TABS tablet  Take 1 tablet by mouth daily             rOPINIRole (REQUIP) 0.25 MG tablet  Take 1 tablet by mouth 2 times daily             SM LUBRICANT EYE DROPS 0.4-0.3 % ophthalmic solution  PLACE 1 DROP INTO BOTH EYES FOUR TIMES DAILY AS NEEDED FOR DRY EYES             tiZANidine (ZANAFLEX) 4 MG tablet  TAKE 1 TABLET BY MOUTH DAILY AS NEEDED             vitamin B-12 (CYANOCOBALAMIN) 100 MCG tablet  TAKE 1 TABLET BY MOUTH DAILY AS NEEDED FOR FATIGUE                   Medications marked \"taking\" at this time  Outpatient Medications Marked as Taking for the 12/3/20 encounter (Office Visit) with Sandie Morgan PA-C   Medication Sig Dispense Refill    midodrine (PROAMATINE) 5 MG tablet Take 1 tablet three times daily if SBP <120 90 tablet 1    bumetanide (BUMEX) 2 MG tablet Take 2 mg of Bumex in morning, 1 mg of Bumex in afternoon, take 1 mg in evening if weight is >3 above baseline weight 60 tablet 2    potassium chloride (KLOR-CON M) 20 MEQ extended release tablet Take 1 tablet by mouth 2 times daily 56 tablet 5    Multiple Vitamin (MULTI-VITAMINS) TABS Take 1 tablet by mouth daily 28 tablet 5    metoprolol succinate (TOPROL XL) 25 MG extended release tablet Take 1 tablet by mouth 2 times daily DO NOT TAKE IF SBP <100 OR HR<60 60 tablet 2    amoxicillin-clavulanate (AUGMENTIN) 500-125 MG per tablet Take 1 tablet by mouth 2 times daily for 7 days 14 tablet 0    cetirizine (ZYRTEC) 10 MG tablet TAKE 1 TABLET BY MOUTH DAILY 28 tablet 5    gabapentin (NEURONTIN) 300 MG capsule Take 1 capsule by mouth 3 times daily for 90 days.  84 capsule 2    rOPINIRole (REQUIP) 0.25 MG tablet Take 1 tablet by mouth 2 times daily 56 tablet 3    fluticasone preserved ejection fraction (LVEF 65-70%, TTE 06/21/2020), paroxysmal atrial fibrillation on Xarelto, sick sinus syndrome, chronic obstructive pulmonary disease, mild pulmonary hypertension (RVSP 30-35 mmHg) who presented to the emergency department from pulmonology clinic (Dr. Mik Nash) for increased lower extremity edema, weight gain and mild dyspnea on exertion. Patient has gained 25 lb over the last 10 days. Bumex dose was recently doubled to 2 mg p.o. daily without effect. Associated with orthopnea, increased fatigue, generalized weakness and malaise. No chest pain, dyspnea at rest, fevers, chills, cough nausea, vomiting, abdominal pain. Ambulatory at baseline with walker. Brief history of smoking 42 years ago for 8 years times half pack per day. No significant alcohol consumption. No drug use. Notably worked in [employer] in the past, with significant exposure to dust.     In ED, afebrile, regular rate, normotensive, saturating well on room air. Labs remarkable for hemoglobin 10.0, bicarb 33, , negative troponin. EKG in atrial fibrillation with ventricular rate of 74. Chest x-ray with cardiomegaly and elevated right hemidiaphragm but otherwise unremarkable. Admitted to medicine for further management of volume overload. Hospital Course  Patient was started on IV lasix until LE edema and shortness of breath improved. BP was on the lower side during admission which is baseline for patient, so Midodrine was added to his scheduled medications as he continues to need Bumex. 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 Patient was discharged home today with home health. Before discharge patient was instructed on taking 2 mg Bumex in morning and 1 mg in afternoon daily.  With instructions to check weight daily and increase dose of Bumex by 1 mg if body weight increases >3 pounds above baseline negatives include no chills, hematuria, nausea or vomiting. He has tried nothing for the symptoms. The treatment provided no relief. Patient states UTI symptoms started 2 days ago, dysuria. Patient provided urine sample in office for UA which was positive for blood, otherwise unremarkable. Discussed dysuria in depth with patient, offered antibiotic treatment but patient declined due to no white blood cells, informed patient we will send sample for culture for further evaluation for any possible antibiotic prescription, patient voiced understanding. Patient states \"has home health nurse coming to his home and will start home health PT on 12/4/2020\". Patient is hypotensive in office. Struck to patient to take midodrine when he gets home, patient voiced understanding. Patient weight is stable. Patient requesting additional medication refills. Labs reviewed. Health maintenance reviewed. Medications reviewed, prescribed Augmentin 500-125 mg twice daily for 7 days, refill Bumex 2 mg, metoprolol 25 mg, midodrine 5 mg, multivitamin, potassium 20 MEQ. Review of Systems   Constitutional: Negative for chills and fever. HENT: Positive for congestion and sinus pressure. Negative for sneezing and sore throat. Respiratory: Positive for cough (productive, \"yellow-brownish\") and shortness of breath (on exertion). Negative for wheezing. Cardiovascular: Positive for leg swelling. Negative for chest pain. Gastrointestinal: Negative for constipation, diarrhea, nausea and vomiting. Genitourinary: Positive for dysuria. Negative for frequency, hematuria and urgency. Musculoskeletal: Negative for back pain, myalgias and neck pain. Neurological: Negative for headaches. Vitals:    12/03/20 1323   BP: (!) 96/55   Site: Right Upper Arm   Position: Sitting   Cuff Size: Medium Adult   Pulse: 59   Temp: 97.5 °F (36.4 °C)   SpO2: 97%   Weight: 207 lb (93.9 kg)     Body mass index is 32.42 kg/m². Wt Readings from Last 3 Encounters:   12/03/20 207 lb (93.9 kg)   11/12/20 228 lb (103.4 kg)   11/05/20 222 lb 3.2 oz (100.8 kg)     BP Readings from Last 3 Encounters:   12/03/20 (!) 96/55   11/12/20 104/64   11/05/20 105/61       Physical Exam  Vitals signs reviewed. Constitutional:       Appearance: Normal appearance. He is well-developed and well-groomed. He is obese. HENT:      Head: Normocephalic and atraumatic. Right Ear: External ear normal.      Left Ear: External ear normal.      Nose: Mucosal edema, congestion and rhinorrhea present. No nasal tenderness. Right Turbinates: Swollen. Left Turbinates: Swollen. Right Sinus: Maxillary sinus tenderness and frontal sinus tenderness present. Left Sinus: Maxillary sinus tenderness and frontal sinus tenderness present. Eyes:      General: Lids are normal.      Conjunctiva/sclera: Conjunctivae normal.      Pupils: Pupils are equal, round, and reactive to light. Cardiovascular:      Rate and Rhythm: Normal rate and regular rhythm. Heart sounds: Normal heart sounds. Pulmonary:      Effort: Pulmonary effort is normal.      Breath sounds: Normal breath sounds. Abdominal:      Palpations: Abdomen is soft. There is no mass. Tenderness: There is no abdominal tenderness. Musculoskeletal:         General: No tenderness. Right lower leg: He exhibits swelling. Edema present. Left lower leg: He exhibits swelling. Edema present. Lymphadenopathy:      Head:      Right side of head: Posterior auricular adenopathy present. No submandibular or preauricular adenopathy. Left side of head: Posterior auricular adenopathy present. No submandibular or preauricular adenopathy. Cervical: Cervical adenopathy present. Right cervical: Posterior cervical adenopathy present. No superficial cervical adenopathy. Left cervical: Superficial cervical adenopathy present. No posterior cervical adenopathy.    Skin: General: Skin is warm and dry. Neurological:      Mental Status: He is alert and oriented to person, place, and time. Psychiatric:         Behavior: Behavior is cooperative. Assessment/Plan:  Dima Velasco was seen today for follow-up from hospital.    Diagnoses and all orders for this visit:    Chronic diastolic heart failure (HCC)  -     bumetanide (BUMEX) 2 MG tablet; Take 2 mg of Bumex in morning, 1 mg of Bumex in afternoon, take 1 mg in evening if weight is >3 above baseline weight  -     metoprolol succinate (TOPROL XL) 25 MG extended release tablet; Take 1 tablet by mouth 2 times daily DO NOT TAKE IF SBP <100 OR HR<60  -     AR DISCHARGE MEDS RECONCILED W/ CURRENT OUTPATIENT MED LIST    Acute recurrent pansinusitis  -     amoxicillin-clavulanate (AUGMENTIN) 500-125 MG per tablet; Take 1 tablet by mouth 2 times daily for 7 days    Dysuria  -     POCT Urinalysis no Micro  -     Culture, Urine; Future    Hypotension, unspecified hypotension type  -     midodrine (PROAMATINE) 5 MG tablet; Take 1 tablet three times daily if SBP <120  -     AR DISCHARGE MEDS RECONCILED W/ CURRENT OUTPATIENT MED LIST    Class 1 obesity due to excess calories with serious comorbidity and body mass index (BMI) of 32.0 to 32.9 in adult    Medication refill  -     potassium chloride (KLOR-CON M) 20 MEQ extended release tablet;  Take 1 tablet by mouth 2 times daily  -     Multiple Vitamin (MULTI-VITAMINS) TABS; Take 1 tablet by mouth daily            Medical Decision Making: moderate complexity

## 2020-12-06 ASSESSMENT — ENCOUNTER SYMPTOMS
DIARRHEA: 0
BACK PAIN: 0
WHEEZING: 0
CONSTIPATION: 0

## 2020-12-08 LAB
CULTURE: ABNORMAL
CULTURE: ABNORMAL
Lab: ABNORMAL
SPECIMEN DESCRIPTION: ABNORMAL

## 2020-12-10 ENCOUNTER — NURSE ONLY (OUTPATIENT)
Dept: PRIMARY CARE CLINIC | Age: 75
End: 2020-12-10
Payer: MEDICARE

## 2020-12-10 PROCEDURE — 96372 THER/PROPH/DIAG INJ SC/IM: CPT | Performed by: PHYSICIAN ASSISTANT

## 2020-12-10 RX ORDER — CEFTRIAXONE SODIUM 250 MG/1
1 INJECTION, POWDER, FOR SOLUTION INTRAMUSCULAR; INTRAVENOUS ONCE
Status: COMPLETED | OUTPATIENT
Start: 2020-12-10 | End: 2020-12-10

## 2020-12-10 RX ADMIN — CEFTRIAXONE SODIUM 1 G: 250 INJECTION, POWDER, FOR SOLUTION INTRAMUSCULAR; INTRAVENOUS at 14:36

## 2020-12-10 NOTE — PROGRESS NOTES
Pt is here for Ceftriaxone injection. Given in Left Dorsogluteal. Pt tolerated well. Risks and benefits explained.

## 2020-12-10 NOTE — PROGRESS NOTES
Inform pt of will reorder urine culture to reevaluate and have completed in 1 week. Informed patient infectious disease was contacted for additional treatment plan input.

## 2020-12-14 ENCOUNTER — TELEPHONE (OUTPATIENT)
Dept: PRIMARY CARE CLINIC | Age: 75
End: 2020-12-14

## 2020-12-15 RX ORDER — BUMETANIDE 1 MG/1
TABLET ORAL
Qty: 120 TABLET | Refills: 0 | Status: SHIPPED | OUTPATIENT
Start: 2020-12-15 | End: 2021-03-06 | Stop reason: SDUPTHER

## 2020-12-16 ENCOUNTER — OFFICE VISIT (OUTPATIENT)
Dept: GASTROENTEROLOGY | Age: 75
End: 2020-12-16
Payer: MEDICARE

## 2020-12-16 VITALS — TEMPERATURE: 97.2 F | WEIGHT: 203 LBS | BODY MASS INDEX: 31.79 KG/M2

## 2020-12-16 PROCEDURE — 1036F TOBACCO NON-USER: CPT | Performed by: INTERNAL MEDICINE

## 2020-12-16 PROCEDURE — G8427 DOCREV CUR MEDS BY ELIG CLIN: HCPCS | Performed by: INTERNAL MEDICINE

## 2020-12-16 PROCEDURE — 1123F ACP DISCUSS/DSCN MKR DOCD: CPT | Performed by: INTERNAL MEDICINE

## 2020-12-16 PROCEDURE — G8417 CALC BMI ABV UP PARAM F/U: HCPCS | Performed by: INTERNAL MEDICINE

## 2020-12-16 PROCEDURE — 99213 OFFICE O/P EST LOW 20 MIN: CPT | Performed by: INTERNAL MEDICINE

## 2020-12-16 PROCEDURE — 3017F COLORECTAL CA SCREEN DOC REV: CPT | Performed by: INTERNAL MEDICINE

## 2020-12-16 PROCEDURE — 4040F PNEUMOC VAC/ADMIN/RCVD: CPT | Performed by: INTERNAL MEDICINE

## 2020-12-16 PROCEDURE — G8484 FLU IMMUNIZE NO ADMIN: HCPCS | Performed by: INTERNAL MEDICINE

## 2020-12-16 ASSESSMENT — ENCOUNTER SYMPTOMS
BLOOD IN STOOL: 0
SINUS PRESSURE: 0
CONSTIPATION: 0
ANAL BLEEDING: 0
NAUSEA: 0
DIARRHEA: 0
ALLERGIC/IMMUNOLOGIC NEGATIVE: 1
CHOKING: 0
WHEEZING: 0
TROUBLE SWALLOWING: 1
RECTAL PAIN: 0
ABDOMINAL DISTENTION: 0
BACK PAIN: 1
ABDOMINAL PAIN: 0
VOICE CHANGE: 0
SORE THROAT: 0
VOMITING: 1
COUGH: 1

## 2020-12-17 ENCOUNTER — HOSPITAL ENCOUNTER (OUTPATIENT)
Age: 75
Setting detail: SPECIMEN
Discharge: HOME | End: 2020-12-17
Payer: MEDICARE

## 2020-12-17 LAB
-: NORMAL
AMORPHOUS: NORMAL
BACTERIA: NORMAL
BILIRUBIN URINE: NEGATIVE
CASTS UA: NORMAL /LPF (ref 0–8)
COLOR: YELLOW
COMMENT UA: ABNORMAL
CRYSTALS, UA: NORMAL /HPF
EPITHELIAL CELLS UA: NORMAL /HPF (ref 0–5)
GLUCOSE URINE: NEGATIVE
KETONES, URINE: NEGATIVE
LEUKOCYTE ESTERASE, URINE: ABNORMAL
MUCUS: NORMAL
NITRITE, URINE: NEGATIVE
OTHER OBSERVATIONS UA: NORMAL
PH UA: 5.5 (ref 5–8)
PROTEIN UA: NEGATIVE
RBC UA: NORMAL /HPF (ref 0–4)
RENAL EPITHELIAL, UA: NORMAL /HPF
SPECIFIC GRAVITY UA: 1.02 (ref 1–1.03)
TRICHOMONAS: NORMAL
TURBIDITY: CLEAR
URINE HGB: NEGATIVE
UROBILINOGEN, URINE: NORMAL
WBC UA: NORMAL /HPF (ref 0–5)
YEAST: NORMAL

## 2020-12-18 LAB
CULTURE: NO GROWTH
Lab: NORMAL
SPECIMEN DESCRIPTION: NORMAL

## 2021-01-04 ENCOUNTER — OFFICE VISIT (OUTPATIENT)
Dept: PRIMARY CARE CLINIC | Age: 76
End: 2021-01-04
Payer: MEDICARE

## 2021-01-04 ENCOUNTER — HOSPITAL ENCOUNTER (OUTPATIENT)
Age: 76
Setting detail: SPECIMEN
Discharge: HOME | End: 2021-01-04
Payer: MEDICARE

## 2021-01-04 ENCOUNTER — NURSE TRIAGE (OUTPATIENT)
Dept: OTHER | Facility: CLINIC | Age: 76
End: 2021-01-04

## 2021-01-04 VITALS
HEART RATE: 74 BPM | WEIGHT: 203 LBS | TEMPERATURE: 99.7 F | DIASTOLIC BLOOD PRESSURE: 55 MMHG | BODY MASS INDEX: 28.42 KG/M2 | HEIGHT: 71 IN | SYSTOLIC BLOOD PRESSURE: 94 MMHG

## 2021-01-04 DIAGNOSIS — R30.0 DYSURIA: Primary | ICD-10-CM

## 2021-01-04 DIAGNOSIS — Z12.5 PROSTATE CANCER SCREENING: ICD-10-CM

## 2021-01-04 DIAGNOSIS — R30.0 DYSURIA: ICD-10-CM

## 2021-01-04 LAB
-: NORMAL
AMORPHOUS: NORMAL
BACTERIA: NORMAL
BILIRUBIN URINE: NEGATIVE
BILIRUBIN, POC: ABNORMAL
BLOOD URINE, POC: ABNORMAL
CASTS UA: NORMAL /LPF (ref 0–8)
CLARITY, POC: CLEAR
COLOR, POC: YELLOW
COLOR: YELLOW
COMMENT UA: ABNORMAL
CRYSTALS, UA: NORMAL /HPF
EPITHELIAL CELLS UA: NORMAL /HPF (ref 0–5)
GLUCOSE URINE, POC: ABNORMAL
GLUCOSE URINE: NEGATIVE
KETONES, POC: ABNORMAL
KETONES, URINE: NEGATIVE
LEUKOCYTE EST, POC: ABNORMAL
LEUKOCYTE ESTERASE, URINE: NEGATIVE
MUCUS: NORMAL
NITRITE, POC: ABNORMAL
NITRITE, URINE: NEGATIVE
OTHER OBSERVATIONS UA: NORMAL
PH UA: 5 (ref 5–8)
PH, POC: 6
PROTEIN UA: NEGATIVE
PROTEIN, POC: ABNORMAL
RBC UA: NORMAL /HPF (ref 0–4)
RENAL EPITHELIAL, UA: NORMAL /HPF
SPECIFIC GRAVITY UA: 1.01 (ref 1–1.03)
SPECIFIC GRAVITY, POC: 1.02
TRICHOMONAS: NORMAL
TURBIDITY: CLEAR
URINE HGB: ABNORMAL
UROBILINOGEN, POC: ABNORMAL
UROBILINOGEN, URINE: NORMAL
WBC UA: NORMAL /HPF (ref 0–5)
YEAST: NORMAL

## 2021-01-04 PROCEDURE — 4040F PNEUMOC VAC/ADMIN/RCVD: CPT | Performed by: INTERNAL MEDICINE

## 2021-01-04 PROCEDURE — 81003 URINALYSIS AUTO W/O SCOPE: CPT | Performed by: INTERNAL MEDICINE

## 2021-01-04 PROCEDURE — G8484 FLU IMMUNIZE NO ADMIN: HCPCS | Performed by: INTERNAL MEDICINE

## 2021-01-04 PROCEDURE — 1036F TOBACCO NON-USER: CPT | Performed by: INTERNAL MEDICINE

## 2021-01-04 PROCEDURE — 99213 OFFICE O/P EST LOW 20 MIN: CPT | Performed by: INTERNAL MEDICINE

## 2021-01-04 PROCEDURE — 1123F ACP DISCUSS/DSCN MKR DOCD: CPT | Performed by: INTERNAL MEDICINE

## 2021-01-04 PROCEDURE — G8417 CALC BMI ABV UP PARAM F/U: HCPCS | Performed by: INTERNAL MEDICINE

## 2021-01-04 PROCEDURE — 3017F COLORECTAL CA SCREEN DOC REV: CPT | Performed by: INTERNAL MEDICINE

## 2021-01-04 PROCEDURE — G8427 DOCREV CUR MEDS BY ELIG CLIN: HCPCS | Performed by: INTERNAL MEDICINE

## 2021-01-04 RX ORDER — CARBOXYMETHYLCELLULOSE SODIUM 5 MG/ML
1 SOLUTION/ DROPS OPHTHALMIC 4 TIMES DAILY
COMMUNITY

## 2021-01-04 RX ORDER — CIPROFLOXACIN 250 MG/1
250 TABLET, FILM COATED ORAL 2 TIMES DAILY
Qty: 20 TABLET | Refills: 0 | Status: SHIPPED | OUTPATIENT
Start: 2021-01-04 | End: 2021-01-14

## 2021-01-04 RX ORDER — METHYL SALICYLATE/MENTHOL
CREAM (GRAM) TOPICAL 2 TIMES DAILY PRN
Status: ON HOLD | COMMUNITY
Start: 2020-06-26 | End: 2022-01-10 | Stop reason: HOSPADM

## 2021-01-04 NOTE — PROGRESS NOTES
[address]  [address] [place], [place] [id number]  Dept: [phone]  Dept Fax: [phone]    Ravindra Montemayor is a 77 y.o. male who presents to the urgent care today for his medicalconditions/complaints as noted below. Ravindra Montemayor is c/o of Dysuria ((burns) onset for a week now), Nausea & Vomiting (onset for 3 days), and Nasal Congestion (runny nose onset a week)      HPI:     Dysuria   This is a chronic problem. The current episode started more than 1 month ago. Associated symptoms include frequency.        Past Medical History:   Diagnosis Date    Acute superficial gastritis without hemorrhage 5/26/2018    Anastomotic stricture of stomach     Asthma     Atrial fibrillation ([address]     On Xarelto 6/27/2020    Calculus of gallbladder without cholecystitis without obstruction 5/2/2017    Chronic diastolic heart failure ([address] 11/17/2017    Chronic rhinosinusitis 4/13/2015    Class 2 severe obesity due to excess calories with serious comorbidity and body mass index (BMI) of 36.0 to 36.9 in adult [facility]) 11/17/2017    COPD (chronic obstructive pulmonary disease) ([address]     [facility]. [facility] septicemia ([facility]     E. 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 coli (HCC)     Due to UTI     Simple chronic bronchitis ([address] 4/10/2018    Slow transit constipation 11/3/2016    Unspecified sleep apnea     UTI (urinary tract infection)         Current Outpatient Medications   Medication Sig Dispense Refill    carboxymethylcellulose (REFRESH PLUS) 0.5 % SOLN ophthalmic solution Apply 1 drop to eye 4 times daily      Menthol-Methyl Salicylate (THERA-GESIC) 0.5-15 % CREA Apply topically 2 times daily as needed      ciprofloxacin (CIPRO) 250 MG tablet Take 1 tablet by mouth 2 times daily for 10 days 20 tablet 0    bumetanide (BUMEX) 1 MG tablet Take 2 mg of Bumex in morning, 1 mg of Bumex in afternoon, take 1 mg in evening if weight is >3 above baseline weight 120 tablet 0    midodrine (PROAMATINE) 5 MG tablet Take 1 tablet three times daily if SBP <120 90 tablet 1    potassium chloride (KLOR-CON M) 20 MEQ extended release tablet Take 1 tablet by mouth 2 times daily 56 tablet 5    Multiple Vitamin (MULTI-VITAMINS) TABS Take 1 tablet by mouth daily 28 tablet 5    metoprolol succinate (TOPROL XL) 25 MG extended release tablet Take 1 tablet by mouth 2 times daily DO NOT TAKE IF SBP <100 OR HR<60 60 tablet 2    cetirizine (ZYRTEC) 10 MG tablet TAKE 1 TABLET BY MOUTH DAILY 28 tablet 5    gabapentin (NEURONTIN) 300 MG capsule Take 1 capsule by mouth 3 times daily for 90 days.  84 capsule 2    rOPINIRole (REQUIP) 0.25 MG tablet Take 1 tablet by mouth 2 times daily 56 tablet 3    fluticasone (FLONASE) 50 MCG/ACT nasal spray 2 sprays by Nasal route daily 16 g 3    pantoprazole (PROTONIX) 40 MG tablet Take 1 tablet by mouth daily 28 tablet 3    rivaroxaban (XARELTO) 20 MG TABS tablet Take 1 tablet by mouth daily 28 tablet 3    lipase-protease-amylase (CREON) 23930-23409 units delayed release capsule Take by mouth 3 times daily (with meals) 180 capsule 1    guaiFENesin (SM MUCUS RELIEF) 600 MG extended release tablet Take 1 tablet by mouth 2 times daily 60 tablet 2    ascorbic acid (VITAMIN C) 500 MG tablet Take 1 tablet by mouth daily 30 tablet 2    ferrous sulfate (FE TABS 325) 325 (65 Fe) MG EC tablet Take 1 tablet by mouth 2 times daily (with meals) 90 tablet 3    tiZANidine (ZANAFLEX) 4 MG tablet TAKE 1 TABLET BY MOUTH DAILY AS NEEDED 28 tablet 2    vitamin B-12 (CYANOCOBALAMIN) 100 MCG tablet TAKE 1 TABLET BY MOUTH DAILY AS NEEDED FOR FATIGUE 30 tablet 5    SM LUBRICANT EYE DROPS 0.4-0.3 % ophthalmic solution PLACE 1 DROP INTO BOTH EYES FOUR TIMES DAILY AS NEEDED FOR DRY EYES 15 mL 3    Fluticasone furoate-vilanterol (BREO ELLIPTA) 200-25 MCG/INH AEPB inhaler Inhale 1 puff into the lungs daily      Handicap Placard MISC by Does not apply route 1 each 0    Incontinence Supply Disposable (INCONTINENCE BRIEF LARGE) MISC To use as directed. Use 3x a day 90 each 3    polyethylene glycol (GLYCOLAX) powder Take 17 g by mouth daily 527 g 3    Foot Care Products (TRI-BALANCE ORTHOTICS MENS) MISC Provide insurance covered orthotics shoes, wear daily 2 each 0    albuterol (PROVENTIL) 2 MG tablet Use as needed 1 tablet 0    ipratropium-albuterol (DUONEB) 0.5-2.5 (3) MG/3ML SOLN nebulizer solution Inhale 1 vial into the lungs every 4 hours as needed       Armodafinil 200 MG TABS Take 1 tablet by mouth 2 times daily.  D-Mannose 500 MG CAPS Take 500 mg by mouth 3 times daily 90 capsule 11     No current facility-administered medications for this visit.       Allergies   Allergen Reactions    Darvocet [Propoxyphene N-Acetaminophen] Hives    Other Hives    Oxycodone-Acetaminophen     Propoxyphene      Other reaction(s): Unknown       Health Maintenance   Topic Date Due    Shingles Vaccine (1 of 2) 10/31/1996    Annual Wellness Visit (AWV)  07/23/2020    Flu vaccine (1) 11/05/2021 (Originally 9/1/2020)    Potassium monitoring  11/12/2021    Creatinine monitoring  11/12/2021    Colon cancer screen colonoscopy  03/26/2023    Lipid screen  06/24/2024    DTaP/Tdap/Td vaccine (2 - Td) 04/22/2029    Pneumococcal 65+ years Vaccine  Completed    AAA screen Completed    Hepatitis C screen  Completed    Hepatitis A vaccine  Aged Out    Hepatitis B vaccine  Aged Out    Hib vaccine  Aged Out    Meningococcal (ACWY) vaccine  Aged Out       Subjective:      Review of Systems   Genitourinary: Positive for dysuria and frequency. All other systems reviewed and are negative. Objective:     Physical Exam  Vitals signs reviewed. Constitutional:       Appearance: Normal appearance. HENT:      Head: Normocephalic and atraumatic. Skin:     General: Skin is warm and dry. Neurological:      General: No focal deficit present. Mental Status: He is alert and oriented to person, place, and time. Psychiatric:         Mood and Affect: Mood normal.         Behavior: Behavior normal.       BP (!) 94/55 (Site: Left Upper Arm, Position: Sitting, Cuff Size: Medium Adult)   Pulse 74   Temp 99.7 °F (37.6 °C) (Oral)   Ht 5' 10.5\" (1.791 m)   Wt 203 lb (92.1 kg)   BMI 28.72 kg/m²       Assessment:       Diagnosis Orders   1. Dysuria  POCT Urinalysis No Micro (Auto)    Urinalysis Reflex to Culture    ciprofloxacin (CIPRO) 250 MG tablet   2. Prostate cancer screening  Psa screening       Plan:      No follow-ups on file. Orders Placed This Encounter   Medications    ciprofloxacin (CIPRO) 250 MG tablet     Sig: Take 1 tablet by mouth 2 times daily for 10 days     Dispense:  20 tablet     Refill:  0     Orders Placed This Encounter   Procedures    Psa screening     Standing Status:   Future     Standing Expiration Date:   1/4/2022    Urinalysis Reflex to Culture     Standing Status:   Future     Standing Expiration Date:   1/4/2022     Order Specific Question:   SPECIFY(EX-CATH,MIDSTREAM,CYSTO,ETC)? Answer:   ms cc    POCT Urinalysis No Micro (Auto)            Patient given educational materials - see patient instructions. Discussed use, benefit, and side effects of prescribed medications. All patientquestions answered.   Pt voiced

## 2021-01-14 ENCOUNTER — OFFICE VISIT (OUTPATIENT)
Dept: PRIMARY CARE CLINIC | Age: 76
End: 2021-01-14
Payer: MEDICARE

## 2021-01-14 ENCOUNTER — HOSPITAL ENCOUNTER (OUTPATIENT)
Age: 76
Discharge: HOME | End: 2021-01-14
Payer: MEDICARE

## 2021-01-14 VITALS
TEMPERATURE: 98.4 F | WEIGHT: 200.2 LBS | OXYGEN SATURATION: 97 % | BODY MASS INDEX: 28.32 KG/M2 | SYSTOLIC BLOOD PRESSURE: 118 MMHG | HEART RATE: 66 BPM | DIASTOLIC BLOOD PRESSURE: 74 MMHG

## 2021-01-14 DIAGNOSIS — N40.1 BENIGN PROSTATIC HYPERPLASIA WITH URINARY HESITANCY: ICD-10-CM

## 2021-01-14 DIAGNOSIS — Z12.5 PROSTATE CANCER SCREENING: ICD-10-CM

## 2021-01-14 DIAGNOSIS — R31.29 OTHER MICROSCOPIC HEMATURIA: Primary | ICD-10-CM

## 2021-01-14 DIAGNOSIS — R09.81 SINUS CONGESTION: ICD-10-CM

## 2021-01-14 DIAGNOSIS — R39.11 BENIGN PROSTATIC HYPERPLASIA WITH URINARY HESITANCY: ICD-10-CM

## 2021-01-14 LAB
BILIRUBIN, POC: NEGATIVE
BLOOD URINE, POC: ABNORMAL
CLARITY, POC: ABNORMAL
COLOR, POC: YELLOW
GLUCOSE URINE, POC: NEGATIVE
KETONES, POC: NEGATIVE
LEUKOCYTE EST, POC: NEGATIVE
NITRITE, POC: NEGATIVE
PH, POC: 6
PROSTATE SPECIFIC ANTIGEN: 0.09 UG/L
PROTEIN, POC: NEGATIVE
SPECIFIC GRAVITY, POC: 1.01
UROBILINOGEN, POC: NEGATIVE

## 2021-01-14 PROCEDURE — 36415 COLL VENOUS BLD VENIPUNCTURE: CPT

## 2021-01-14 PROCEDURE — 81003 URINALYSIS AUTO W/O SCOPE: CPT | Performed by: NURSE PRACTITIONER

## 2021-01-14 PROCEDURE — 1123F ACP DISCUSS/DSCN MKR DOCD: CPT | Performed by: NURSE PRACTITIONER

## 2021-01-14 PROCEDURE — 4040F PNEUMOC VAC/ADMIN/RCVD: CPT | Performed by: NURSE PRACTITIONER

## 2021-01-14 PROCEDURE — 99214 OFFICE O/P EST MOD 30 MIN: CPT | Performed by: NURSE PRACTITIONER

## 2021-01-14 PROCEDURE — G8417 CALC BMI ABV UP PARAM F/U: HCPCS | Performed by: NURSE PRACTITIONER

## 2021-01-14 PROCEDURE — 3017F COLORECTAL CA SCREEN DOC REV: CPT | Performed by: NURSE PRACTITIONER

## 2021-01-14 PROCEDURE — G0103 PSA SCREENING: HCPCS

## 2021-01-14 PROCEDURE — G8427 DOCREV CUR MEDS BY ELIG CLIN: HCPCS | Performed by: NURSE PRACTITIONER

## 2021-01-14 PROCEDURE — G8484 FLU IMMUNIZE NO ADMIN: HCPCS | Performed by: NURSE PRACTITIONER

## 2021-01-14 PROCEDURE — 1036F TOBACCO NON-USER: CPT | Performed by: NURSE PRACTITIONER

## 2021-01-14 RX ORDER — BENZONATATE 100 MG/1
100 CAPSULE ORAL 3 TIMES DAILY PRN
Qty: 30 CAPSULE | Refills: 3 | Status: SHIPPED | OUTPATIENT
Start: 2021-01-14 | End: 2021-03-31

## 2021-01-14 RX ORDER — TAMSULOSIN HYDROCHLORIDE 0.4 MG/1
0.4 CAPSULE ORAL DAILY
Qty: 30 CAPSULE | Refills: 1 | Status: SHIPPED | OUTPATIENT
Start: 2021-01-14 | End: 2021-02-05 | Stop reason: SDUPTHER

## 2021-01-14 ASSESSMENT — PATIENT HEALTH QUESTIONNAIRE - PHQ9
SUM OF ALL RESPONSES TO PHQ QUESTIONS 1-9: 0
SUM OF ALL RESPONSES TO PHQ QUESTIONS 1-9: 0
SUM OF ALL RESPONSES TO PHQ9 QUESTIONS 1 & 2: 0

## 2021-01-14 ASSESSMENT — ENCOUNTER SYMPTOMS
BLOOD IN STOOL: 0
VOMITING: 0
ABDOMINAL PAIN: 1
DIARRHEA: 0
NAUSEA: 1
RECTAL PAIN: 0

## 2021-01-14 NOTE — PROGRESS NOTES
[facility]  [id number] [facility] [address]  Dept: [phone]  Dept Fax: [phone]    Patient Care Team:  Lizabeth Ambrosio PA-C as PCP - General (Physician Assistant)  Lizabeth Ambrosio PA-C as PCP - [facility] Provider  [facility] [address] [facility] [place] as Consulting Physician (Gastroenterology)  Semaj Fernández MD as Consulting Physician (Cardiology)  Kj Pearce MD as Consulting Physician (Pulmonology)    2021     Campbell Triplett (:  1945)is a 77 y.o. male, here for evaluation of the following medical concerns:   Chief Complaint   Patient presents with    Follow-up     Dysuria    Fatigue    Nausea     Kaden Hammond presents today to follow-up after a walk-in clinic visit on 2021. His primary care provider is Lizabeth Ambrosio. He presented with complaints of dysuria was treated with ciprofloxacin 250 mg 1 tablet twice daily for 10 days. His urinalysis was negative for leukocytes, nitrates, ketones or protein. There was small amount of blood. The point-of-care urine demonstrated a large amount of blood and, but otherwise normal urinalysis. PSA was ordered and collected this morning prior to patient's visit. Patient had similar complaints in December. Urine culture on December 3, 2020 demonstrated the presence of E. coli and Providencia stuartii. Mr. Claire Preciado is not diabetic and has no history of urological disease or procedures. For his infection in December, he was treated with an IM dose of ceftriaxone due to the multiple organism present in his culture. Repeat urine culture on  demonstrated no evidence of infection or bacteria.     Kaden Hammond states he's not having pain with urination  + hesitancy for the last 4 months  No visible blood  + lower abdomen pain without flank pain  + chills, cold all the time  + nausea, this is not normal for him    He does follow with Dr Brielle Douglass, had similar problems last year  \"Had a probe, bladder looked fine\"  Emil Steven states he feels no better after his most recent round of antibiotics, ciprofloxacin that he was prescribed after his walk-in clinic visit on 4 January. He is also asking for a new medication for his cough, he states the cost of the Mucinex is tripled. .    Review of Systems   Constitutional: Positive for chills and fatigue. Negative for fever. Gastrointestinal: Positive for abdominal pain and nausea. Negative for blood in stool, diarrhea, rectal pain and vomiting. Genitourinary: Positive for difficulty urinating and frequency. Negative for decreased urine volume, dysuria, flank pain, hematuria, scrotal swelling and testicular pain. Skin: Negative for pallor and wound. Neurological: Positive for weakness. Negative for dizziness. Prior to Visit Medications    Medication Sig Taking?  Authorizing Provider   tamsulosin (FLOMAX) 0.4 MG capsule Take 1 capsule by mouth daily Yes JULIAN Ambriz CNP   benzonatate (TESSALON) 100 MG capsule Take 1 capsule by mouth 3 times daily as needed for Cough Yes JULIAN Ambriz CNP   gabapentin (NEURONTIN) 300 MG capsule TAKE 1 CAPSULE BY MOUTH THREE TIMES DAILY Yes Lars Simmons PA-C   MUCUS RELIEF 600 MG extended release tablet TAKE 1 TABLET BY MOUTH TWICE DAILY Yes Lars Simmons PA-C   tiZANidine (ZANAFLEX) 4 MG tablet TAKE 1 TABLET BY MOUTH DAILY AS NEEDED Yes Lars Simmons PA-C   carboxymethylcellulose (REFRESH PLUS) 0.5 % SOLN ophthalmic solution Apply 1 drop to eye 4 times daily Yes Historical Provider, MD   Menthol-Methyl Salicylate (THERA-GESIC) 0.5-15 % CREA Apply topically 2 times daily as needed Yes Historical Provider, MD   ciprofloxacin (CIPRO) 250 MG tablet Take 1 tablet by mouth 2 times daily for 10 days Yes Janet Chu MD   bumetanide (BUMEX) 1 MG tablet Take 2 mg of Bumex in morning, 1 mg of Bumex in afternoon, take 1 mg in evening if weight is >3 above baseline weight Yes Shanta Cunningham PA-C   midodrine (PROAMATINE) 5 MG tablet Take 1 tablet three times daily if SBP <120 Yes Shanta Cunningham PA-C   potassium chloride (KLOR-CON M) 20 MEQ extended release tablet Take 1 tablet by mouth 2 times daily Yes Shanta Cunningham PA-C   Multiple Vitamin (MULTI-VITAMINS) TABS Take 1 tablet by mouth daily Yes Shanta Cunningham PA-C   metoprolol succinate (TOPROL XL) 25 MG extended release tablet Take 1 tablet by mouth 2 times daily DO NOT TAKE IF SBP <100 OR HR<60 Yes Shanta Cunningham PA-C   cetirizine (ZYRTEC) 10 MG tablet TAKE 1 TABLET BY MOUTH DAILY Yes Shanta Cunningham PA-C   rOPINIRole (REQUIP) 0.25 MG tablet Take 1 tablet by mouth 2 times daily Yes Shanta Cunningham PA-C   fluticasone (FLONASE) 50 MCG/ACT nasal spray 2 sprays by Nasal route daily Yes Shanta Cunningham PA-C   pantoprazole (PROTONIX) 40 MG tablet Take 1 tablet by mouth daily Yes Shanta Cunningham PA-C   rivaroxaban (XARELTO) 20 MG TABS tablet Take 1 tablet by mouth daily Yes Shanta Cunningham PA-C   lipase-protease-amylase (CREON) 92273-34676 units delayed release capsule Take by mouth 3 times daily (with meals) Yes Shanta Cunningham PA-C   ascorbic acid (VITAMIN C) 500 MG tablet Take 1 tablet by mouth daily Yes Shanta Cunningham PA-C   ferrous sulfate (FE TABS 325) 325 (65 Fe) MG EC tablet Take 1 tablet by mouth 2 times daily (with meals) Yes Sherlyn Henderson PA-C   vitamin B-12 (CYANOCOBALAMIN) 100 MCG tablet TAKE 1 TABLET BY MOUTH DAILY AS NEEDED FOR FATIGUE Yes Bin Kevin PA-C   D-Mannose 500 MG CAPS Take 500 mg by mouth 3 times daily Yes MD SHEREE Montano LUBRICANT EYE DROPS 0.4-0.3 % ophthalmic solution PLACE 1 DROP INTO BOTH EYES FOUR TIMES DAILY AS NEEDED FOR DRY EYES Yes Shanta Cunningham PA-C   Fluticasone furoate-vilanterol (BREO ELLIPTA) 200-25 MCG/INH AEPB inhaler Inhale 1 puff into the lungs daily Yes 157 07/06/2018    HDL 62 06/24/2019    TRIG 101 07/06/2018       Physical Exam  Constitutional:       Appearance: Normal appearance. He is not ill-appearing or toxic-appearing. HENT:      Head: Normocephalic. Eyes:      General: No scleral icterus. Pupils: Pupils are equal, round, and reactive to light. Cardiovascular:      Rate and Rhythm: Normal rate. Pulmonary:      Effort: Pulmonary effort is normal.      Breath sounds: Normal breath sounds. Abdominal:      Tenderness: There is abdominal tenderness in the suprapubic area. There is no right CVA tenderness or left CVA tenderness. Neurological:      Mental Status: He is alert. ASSESSMENT     Diagnosis Orders   1. Other microscopic hematuria  Culture, Urine    POCT Urinalysis No Micro (Auto)    CT ABDOMEN PELVIS WO CONTRAST Additional Contrast? None   2. Benign prostatic hyperplasia with urinary hesitancy  tamsulosin (FLOMAX) 0.4 MG capsule    CT ABDOMEN PELVIS WO CONTRAST Additional Contrast? None   3. Sinus congestion  benzonatate (TESSALON) 100 MG capsule          PLAN:  Orders Placed This Encounter   Medications    tamsulosin (FLOMAX) 0.4 MG capsule     Sig: Take 1 capsule by mouth daily     Dispense:  30 capsule     Refill:  1    benzonatate (TESSALON) 100 MG capsule     Sig: Take 1 capsule by mouth 3 times daily as needed for Cough     Dispense:  30 capsule     Refill:  3         1. Patient with persistent lower abdominal pain and hesitancy despite 10-day course of ciprofloxacin. He is urine culture October resistant to Cipro. Unfortunately, most recent urinalysis with reflex did not trigger a urine culture. 2. Urine culture obtained today, point-of-care still shows evidence of hematuria. Differential diagnosis includes kidney stones. CT abdomen pelvis without contrast in September 2019 did not demonstrate kidney stones, no recent imaging.   Patient has no history of radiation to the pelvis or known surgical procedures involving the bladder or prostate. 3. History of BPH, will start Flomax today for possibility of kidney stone presents as well. Patient already follows with urology due to previous septic infection 2019 secondary to an E. coli urinary tract infection. While he has an appointment next month, I heavily encouraged the patient and his wife to contact the office today. 4. Consider ID referral based on urine culture results. PCP copied on note. Keep PCP appointment in March. Given previous resistance, I wish to wait on urine culture results before starting alternative antibiotics. Patient agreeable, verbalized understanding. He is to report to the ER should he have any changes over the weekend    FOLLOW UP AND INSTRUCTIONS:  No follow-ups on file. · Carol Schmidt received counseling on the following healthy behaviors:medication adherence    · Discussed use, benefit, and side effects of prescribed medications. Barriers to  medication compliance addressed. All patient questions answered. Pt  verbalized understanding of all instructions given. · Patient given educational materials - see patient instructions      · Patient advised to contact scheduling offices for any referrals or imaging orders  placed today if they have not been contacted in 48 hours. No follow-ups on file. An electronic signature was used to authenticate this note. --JULIAN Carson - CNP on 1/14/2021 at 9:14 AM  Visit Information    Have you changed or started any medications since your last visit including any over-the-counter medicines, vitamins, or herbal medicines? no   Are you having any side effects from any of your medications? -  no  Have you stopped taking any of your medications? Is so, why? -  no    Have you seen any other physician or provider since your last visit? Yes - Records Obtained  Have you had any other diagnostic tests since your last visit?  Yes - Records Obtained  Have you been seen in the emergency room and/or had an admission to a hospital since we last saw you? No  Have you had your routine dental cleaning in the past 6 months? no    Have you activated your [employer] account? If not, what are your barriers?  Yes     Patient Care Team:  Eduardo Huertas PA-C as PCP - General (Physician Assistant)  Eduardo Huertas PA-C as PCP - [facility] [address] [facility] MD Lesia as Consulting Physician (Gastroenterology)  Ellyn Meckel, MD as Consulting Physician (Cardiology)  Hayden Alvarado MD as Consulting Physician (Pulmonology)    Medical History Review  Past Medical, Family, and Social History reviewed and does not contribute to the patient presenting condition    Health Maintenance   Topic Date Due    Shingles Vaccine (1 of 2) 10/31/1996    Annual Wellness Visit (AWV)  07/23/2020    Flu vaccine (1) 11/05/2021 (Originally 9/1/2020)    Potassium monitoring  11/12/2021    Creatinine monitoring  11/12/2021    Colon cancer screen colonoscopy  03/26/2023    Lipid screen  06/24/2024    DTaP/Tdap/Td vaccine (2 - Td) 04/22/2029    Pneumococcal 65+ years Vaccine  Completed    AAA screen  Completed    Hepatitis C screen  Completed    Hepatitis A vaccine  Aged Out    Hepatitis B vaccine  Aged Out    Hib vaccine  Aged Out    Meningococcal (ACWY) vaccine  Aged Out

## 2021-01-21 ENCOUNTER — HOSPITAL ENCOUNTER (OUTPATIENT)
Dept: CT IMAGING | Age: 76
Discharge: HOME | End: 2021-01-23
Payer: MEDICARE

## 2021-01-21 ENCOUNTER — HOSPITAL ENCOUNTER (OUTPATIENT)
Age: 76
Discharge: HOME | End: 2021-01-21
Payer: MEDICARE

## 2021-01-21 DIAGNOSIS — R39.11 BENIGN PROSTATIC HYPERPLASIA WITH URINARY HESITANCY: ICD-10-CM

## 2021-01-21 DIAGNOSIS — R31.29 OTHER MICROSCOPIC HEMATURIA: ICD-10-CM

## 2021-01-21 DIAGNOSIS — N40.1 BENIGN PROSTATIC HYPERPLASIA WITH URINARY HESITANCY: ICD-10-CM

## 2021-01-21 PROCEDURE — 74176 CT ABD & PELVIS W/O CONTRAST: CPT

## 2021-01-21 PROCEDURE — 87086 URINE CULTURE/COLONY COUNT: CPT

## 2021-01-22 LAB
CULTURE: NO GROWTH
Lab: NORMAL
SPECIMEN DESCRIPTION: NORMAL

## 2021-01-27 ENCOUNTER — TELEPHONE (OUTPATIENT)
Dept: PRIMARY CARE CLINIC | Age: 76
End: 2021-01-27

## 2021-01-27 DIAGNOSIS — R91.8 RIGHT LOWER LOBE PULMONARY INFILTRATE: ICD-10-CM

## 2021-01-27 DIAGNOSIS — R91.8 LEFT LOWER LOBE PULMONARY INFILTRATE: Primary | ICD-10-CM

## 2021-01-27 NOTE — TELEPHONE ENCOUNTER
----- Message from JULIAN Shaw CNP sent at 1/22/2021 12:57 PM EST -----  Please notify patient that their CT results are normal, no kidney or bowel concerns. However, there is some changes to the lung bases that should be rechecked in a few months.  I will forward the results to his PCP

## 2021-01-27 NOTE — TELEPHONE ENCOUNTER
Will inform patient CT abdomen showed infiltrates in bilateral lower lungs that could be related to infection or inflammatory process, radiology recommended repeat imaging, CT chest, to be completed in 3 months. Patient is seen by pulmonology, will fax results over to that particular office.

## 2021-01-27 NOTE — TELEPHONE ENCOUNTER
Called. [facility] for a return call regarding results. Results faxed to pulmonologist office.

## 2021-02-04 DIAGNOSIS — R91.8 PULMONARY INFILTRATES: Primary | ICD-10-CM

## 2021-02-05 ENCOUNTER — HOSPITAL ENCOUNTER (OUTPATIENT)
Age: 76
Discharge: HOME | End: 2021-02-05
Payer: MEDICARE

## 2021-02-05 ENCOUNTER — HOSPITAL ENCOUNTER (OUTPATIENT)
Dept: CT IMAGING | Age: 76
Discharge: HOME | End: 2021-02-07
Payer: MEDICARE

## 2021-02-05 DIAGNOSIS — R91.8 RIGHT LOWER LOBE PULMONARY INFILTRATE: ICD-10-CM

## 2021-02-05 DIAGNOSIS — N40.1 BENIGN PROSTATIC HYPERPLASIA WITH URINARY HESITANCY: ICD-10-CM

## 2021-02-05 DIAGNOSIS — R39.11 BENIGN PROSTATIC HYPERPLASIA WITH URINARY HESITANCY: ICD-10-CM

## 2021-02-05 DIAGNOSIS — R91.8 LEFT LOWER LOBE PULMONARY INFILTRATE: ICD-10-CM

## 2021-02-05 LAB
CREAT SERPL-MCNC: 0.74 MG/DL (ref 0.7–1.2)
GFR AFRICAN AMERICAN: >60 ML/MIN
GFR NON-AFRICAN AMERICAN: >60 ML/MIN
GFR SERPL CREATININE-BSD FRML MDRD: NORMAL ML/MIN/{1.73_M2}
GFR SERPL CREATININE-BSD FRML MDRD: NORMAL ML/MIN/{1.73_M2}

## 2021-02-05 PROCEDURE — 82565 ASSAY OF CREATININE: CPT

## 2021-02-05 PROCEDURE — 6360000004 HC RX CONTRAST MEDICATION: Performed by: INTERNAL MEDICINE

## 2021-02-05 PROCEDURE — 36415 COLL VENOUS BLD VENIPUNCTURE: CPT

## 2021-02-05 PROCEDURE — 71260 CT THORAX DX C+: CPT

## 2021-02-05 RX ORDER — TAMSULOSIN HYDROCHLORIDE 0.4 MG/1
0.4 CAPSULE ORAL DAILY
Qty: 30 CAPSULE | Refills: 1 | Status: SHIPPED | OUTPATIENT
Start: 2021-02-05 | End: 2021-03-12 | Stop reason: SDUPTHER

## 2021-02-05 RX ADMIN — IOPAMIDOL 75 ML: 755 INJECTION, SOLUTION INTRAVENOUS at 14:43

## 2021-02-05 NOTE — TELEPHONE ENCOUNTER
Health Maintenance   Topic Date Due    COVID-19 Vaccine (1 of 2) 10/31/1962    Shingles Vaccine (1 of 2) 10/31/1996    Annual Wellness Visit (AWV)  07/23/2020    Flu vaccine (1) 11/05/2021 (Originally 9/1/2020)    Potassium monitoring  11/12/2021    Creatinine monitoring  11/12/2021    Colon cancer screen colonoscopy  03/26/2023    Lipid screen  06/24/2024    DTaP/Tdap/Td vaccine (2 - Td) 04/22/2029    Pneumococcal 65+ years Vaccine  Completed    AAA screen  Completed    Hepatitis C screen  Completed    Hepatitis A vaccine  Aged Out    Hepatitis B vaccine  Aged Out    Hib vaccine  Aged Out    Meningococcal (ACWY) vaccine  Aged Out             (applicable per patient's age: Cancer Screenings, Depression Screening, Fall Risk Screening, Immunizations)    LDL Cholesterol (mg/dL)   Date Value   06/24/2019 65     LDL Calculated (mg/dL)   Date Value   10/30/2013 67     AST (U/L)   Date Value   10/11/2020 19     ALT (U/L)   Date Value   10/11/2020 9     BUN (mg/dL)   Date Value   11/12/2020 13      (goal A1C is < 7)   (goal LDL is <100) need 30-50% reduction from baseline     BP Readings from Last 3 Encounters:   01/14/21 118/74   01/04/21 (!) 94/55   12/03/20 (!) 96/55    (goal /80)      All Future Testing planned in [facility]:  Lab Frequency Next Occurrence   CT CHEST W CONTRAST Once 01/27/2021       Next Visit Date:  Future Appointments   Date Time Provider Jeri Flynn   2/5/2021  3:00 PM STV CT  [facility] CT [facility]AN [facility]   3/10/2021  2:00 PM Karyna Moseley MD [facility]   3/12/2021  9:20 AM Davonte Serrano PA-C [facility]            Patient Active Problem List:     Atrial fibrillation (HCC)     Hyperlipidemia     GERD (gastroesophageal reflux disease)     Osteoarthritis of lumbar spine     OA (osteoarthritis) of knee, bilateral     Hallux valgus, acquired, bilateral     Hypertension     Slow transit constipation     MARIAMA on CPAP     Class 2 severe obesity due to excess calories with serious comorbidity and body mass index (BMI) of 36.0 to 36.9 in adult [facility])     Simple chronic bronchitis (HCC)     Hospital-acquired bacterial pneumonia     Fluid overload     COPD (chronic obstructive pulmonary disease) (HCC)     Chronic diastolic heart failure (HCC)     Status post gastroplasty

## 2021-02-08 ENCOUNTER — TELEPHONE (OUTPATIENT)
Dept: PRIMARY CARE CLINIC | Age: 76
End: 2021-02-08

## 2021-02-08 NOTE — TELEPHONE ENCOUNTER
Patient had CT chest completed which showed significantly improved appearance of the lungs when compared to 10/11/2020 with minimal  residual strandy densities in the lung bases and posterior aspect of the  right upper lobe. Will inform patient will fax over CT results to his pulmonologist to be made aware of.

## 2021-02-26 ENCOUNTER — HOSPITAL ENCOUNTER (OUTPATIENT)
Dept: GENERAL RADIOLOGY | Age: 76
Discharge: HOME | End: 2021-02-28
Payer: MEDICARE

## 2021-02-26 ENCOUNTER — HOSPITAL ENCOUNTER (OUTPATIENT)
Age: 76
Discharge: HOME | End: 2021-02-28
Payer: MEDICARE

## 2021-02-26 DIAGNOSIS — J44.9 CHRONIC OBSTRUCTIVE PULMONARY DISEASE, UNSPECIFIED COPD TYPE (HCC): ICD-10-CM

## 2021-02-26 PROCEDURE — 71046 X-RAY EXAM CHEST 2 VIEWS: CPT

## 2021-03-05 DIAGNOSIS — I50.32 CHRONIC DIASTOLIC HEART FAILURE (HCC): ICD-10-CM

## 2021-03-05 NOTE — TELEPHONE ENCOUNTER
Simple chronic bronchitis (HCC)     Hospital-acquired bacterial pneumonia     Fluid overload     COPD (chronic obstructive pulmonary disease) (HCC)     Chronic diastolic heart failure (HCC)     Status post gastroplasty

## 2021-03-06 RX ORDER — BUMETANIDE 1 MG/1
TABLET ORAL
Qty: 120 TABLET | Refills: 0 | Status: ON HOLD | OUTPATIENT
Start: 2021-03-06 | End: 2021-04-10 | Stop reason: SDUPTHER

## 2021-03-10 ENCOUNTER — TELEPHONE (OUTPATIENT)
Dept: GASTROENTEROLOGY | Age: 76
End: 2021-03-10

## 2021-03-10 ENCOUNTER — OFFICE VISIT (OUTPATIENT)
Dept: GASTROENTEROLOGY | Age: 76
End: 2021-03-10
Payer: MEDICARE

## 2021-03-10 VITALS
SYSTOLIC BLOOD PRESSURE: 112 MMHG | WEIGHT: 210 LBS | BODY MASS INDEX: 29.71 KG/M2 | DIASTOLIC BLOOD PRESSURE: 67 MMHG | HEART RATE: 62 BPM

## 2021-03-10 DIAGNOSIS — K62.5 RECTAL BLEEDING: Primary | ICD-10-CM

## 2021-03-10 PROCEDURE — 1123F ACP DISCUSS/DSCN MKR DOCD: CPT | Performed by: INTERNAL MEDICINE

## 2021-03-10 PROCEDURE — 3017F COLORECTAL CA SCREEN DOC REV: CPT | Performed by: INTERNAL MEDICINE

## 2021-03-10 PROCEDURE — G8427 DOCREV CUR MEDS BY ELIG CLIN: HCPCS | Performed by: INTERNAL MEDICINE

## 2021-03-10 PROCEDURE — 1036F TOBACCO NON-USER: CPT | Performed by: INTERNAL MEDICINE

## 2021-03-10 PROCEDURE — G8484 FLU IMMUNIZE NO ADMIN: HCPCS | Performed by: INTERNAL MEDICINE

## 2021-03-10 PROCEDURE — 4040F PNEUMOC VAC/ADMIN/RCVD: CPT | Performed by: INTERNAL MEDICINE

## 2021-03-10 PROCEDURE — G8417 CALC BMI ABV UP PARAM F/U: HCPCS | Performed by: INTERNAL MEDICINE

## 2021-03-10 PROCEDURE — 99213 OFFICE O/P EST LOW 20 MIN: CPT | Performed by: INTERNAL MEDICINE

## 2021-03-10 RX ORDER — POLYETHYLENE GLYCOL 3350 17 G/17G
POWDER, FOR SOLUTION ORAL
Qty: 238 G | Refills: 0 | Status: ON HOLD | OUTPATIENT
Start: 2021-03-10 | End: 2021-04-08 | Stop reason: ALTCHOICE

## 2021-03-10 NOTE — PROGRESS NOTES
GI CLINIC FOLLOW UP    INTERVAL HISTORY:   No referring provider defined for this encounter. Chief Complaint   Patient presents with    Abdominal Pain     Low residual diet was difficult due to the cost. He is feeling better and has not been throwing up as much. he is bleinding his food.,            HISTORY OF PRESENT ILLNESS: Mr.Dennis Desire Cespedes is a 77 y.o. male with delayed gastric emptying. He has been doing well on low fiber/low residue diet. Bowels moving okay. He reports intermittent bright red blood per rectum after bowel movement. He reports colon polyps in the past.  He probably had 2 or 3 colonoscopies over the years. Very likely had polyps every time. He does not recall exactly when the last colonoscopy was. Very likely more than 5 years ago. Past Medical,Family, and Social History reviewed and does not contribute to the patient presentingcondition. Patient's PMH/PSH,SH,PSYCH Hx, MEDs, ALLERGIES, and ROS were all reviewed and updated in the appropriate sections.     PAST MEDICAL HISTORY:  Past Medical History:   Diagnosis Date    Acute superficial gastritis without hemorrhage 5/26/2018    Anastomotic stricture of stomach     Asthma     Atrial fibrillation ([address]     On Xarelto 6/27/2020    Calculus of gallbladder without cholecystitis without obstruction 5/2/2017    Chronic diastolic heart failure ([address] 11/17/2017    Chronic rhinosinusitis 4/13/2015    Class 2 severe obesity due to excess calories with serious comorbidity and body mass index (BMI) of 36.0 to 36.9 in adult [facility]) 11/17/2017    COPD (chronic obstructive pulmonary disease) ([address]     E. coli septicemia (HCC)     E. coli UTI     Foot drop, right 7/10/2012    Fracture of femur ([address] 10/23/2016    Gait disorder 7/20/2016    Gastric out let obstruction     GERD (gastroesophageal reflux disease)     Hallux valgus, acquired, bilateral 6/24/2015    Hyperlipidemia     Hypertension     Impaired hearing 4/13/2015  Internal hemorrhoids 1/3/2017    Lymphedema of both lower extremities 6/24/2015    Nausea & vomiting 11/16/2018    OA (osteoarthritis) of knee, bilateral 12/11/2006    Obesity     MARIAMA on CPAP 5/2/2017    Osteoarthritis     Osteoarthritis of lumbar spine 12/13/2007     replace inactive diagnosis    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 coli (HCC)     Due to UTI     Simple chronic bronchitis ([address] 4/10/2018    Slow transit constipation 11/3/2016    Unspecified sleep apnea     UTI (urinary tract infection)        Past Surgical History:   Procedure Laterality Date    CARPAL TUNNEL RELEASE      bilateral    COLONOSCOPY      CYSTOSCOPY  01/02/2019    by Dr. Adia Sage N/A 1/2/2019    CYSTOSCOPY performed by Paresh Michelle MD at [address]    first knuckle right index finger   [facility]    vertical banded gastroplasty   [address] REPLACEMENT  2004 & 2005    bilateral knees    NC EGD [address] N/A 8/16/2018    EGD FOREIGN BODY REMOVAL performed by Dean Cruz MD at [address] EGD TRANSORAL BIOPSY SINGLE/MULTIPLE N/A 5/25/2018    EGD BIOPSY performed by Alena Arenas DO at [address]      left shoulder    UPPER GASTROINTESTINAL ENDOSCOPY  08/13/2018    removal of food bolus    UPPER GASTROINTESTINAL ENDOSCOPY N/A 8/13/2018    EGD FOREIGN BODY REMOVAL performed by Alena Arenas DO at [facility] 8/13/2018    EGD BIOPSY performed by Alena Arenas DO at [place] [address]  08/16/2018    egd with balloon dilitation    UPPER GASTROINTESTINAL ENDOSCOPY  8/16/2018    EGD DILATION BALLOON performed by Dean Cruz MD at [facility] 10/1/2018    EGD BIOPSY performed by Amarjit Wolfe MD at [address] [facility] GASTROINTESTINAL ENDOSCOPY  10/1/2018    EGD DILATION BALLOON performed by Amarjit Wolfe MD at [address] [facility] N/A 11/19/2018    EGD DILATION BALLOON performed by Amarjit Wolfe MD at [address] [facility] N/A 10/15/2020    EGD SUBMUCOSAL/BOTOX INJECTION tattoo  performed by Lamont Valente MD at [address]:    Current Outpatient Medications:     bumetanide (BUMEX) 1 MG tablet, Take 2 mg of Bumex in morning, 1 mg of Bumex in afternoon, take 1 mg in evening if weight is >3 above baseline weight, Disp: 120 tablet, Rfl: 0    metoprolol succinate (TOPROL XL) 25 MG extended release tablet, TAKE ONE TABLET BY MOUTH TWICE DAILY, Disp: 56 tablet, Rfl: 2    tamsulosin (FLOMAX) 0.4 MG capsule, Take 1 capsule by mouth daily, Disp: 30 capsule, Rfl: 1    pantoprazole (PROTONIX) 40 MG tablet, TAKE 1 TABLET BY MOUTH DAILY, Disp: 28 tablet, Rfl: 3    XARELTO 20 MG TABS tablet, TAKE 1 TABLET BY MOUTH DAILY, Disp: 28 tablet, Rfl: 3    CREON [id number] units delayed release capsule, TAKE 1 CAPSULE BY MOUTH THREE TIMES DAILY WITH MEALS, Disp: 84 capsule, Rfl: 3    rOPINIRole (REQUIP) 0.25 MG tablet, TAKE 1 TABLET BY MOUTH TWICE DAILY, Disp: 56 tablet, Rfl: 3    gabapentin (NEURONTIN) 300 MG capsule, TAKE 1 CAPSULE BY MOUTH THREE TIMES DAILY, Disp: 84 capsule, Rfl: 2    MUCUS RELIEF 600 MG extended release tablet, TAKE 1 TABLET BY MOUTH TWICE DAILY, Disp: 60 tablet, Rfl: 3    tiZANidine (ZANAFLEX) 4 MG tablet, TAKE 1 TABLET BY MOUTH DAILY AS NEEDED, Disp: 28 tablet, Rfl: 3    carboxymethylcellulose (REFRESH PLUS) 0.5 % SOLN ophthalmic solution, Apply 1 drop to eye 4 times daily, Disp: , Rfl:     Menthol-Methyl Salicylate (THERA-GESIC) 0.5-15 % CREA, Apply topically 2 times daily as needed, Disp: , Rfl:     midodrine (PROAMATINE) 5 MG tablet, Take 1 tablet three times daily if SBP <120, Disp: 90 tablet, Rfl: 1    potassium chloride (KLOR-CON M) 20 MEQ extended release tablet, Take 1 tablet by mouth 2 times daily, Disp: 56 tablet, Rfl: 5    Multiple Vitamin (MULTI-VITAMINS) TABS, Take 1 tablet by mouth daily, Disp: 28 tablet, Rfl: 5    cetirizine (ZYRTEC) 10 MG tablet, TAKE 1 TABLET BY MOUTH DAILY, Disp: 28 tablet, Rfl: 5    fluticasone (FLONASE) 50 MCG/ACT nasal spray, 2 sprays by Nasal route daily, Disp: 16 g, Rfl: 3    ascorbic acid (VITAMIN C) 500 MG tablet, Take 1 tablet by mouth daily, Disp: 30 tablet, Rfl: 2    ferrous sulfate (FE TABS 325) 325 (65 Fe) MG EC tablet, Take 1 tablet by mouth 2 times daily (with meals), Disp: 90 tablet, Rfl: 3    vitamin B-12 (CYANOCOBALAMIN) 100 MCG tablet, TAKE 1 TABLET BY MOUTH DAILY AS NEEDED FOR FATIGUE, Disp: 30 tablet, Rfl: 5    D-Mannose 500 MG CAPS, Take 500 mg by mouth 3 times daily, Disp: 90 capsule, Rfl: 11    SM LUBRICANT EYE DROPS 0.4-0.3 % ophthalmic solution, PLACE 1 DROP INTO BOTH EYES FOUR TIMES DAILY AS NEEDED FOR DRY EYES, Disp: 15 mL, Rfl: 3    Fluticasone furoate-vilanterol (BREO ELLIPTA) 200-25 MCG/INH AEPB inhaler, Inhale 1 puff into the lungs daily, Disp: , Rfl:     Handicap Placard MISC, by Does not apply route, Disp: 1 each, Rfl: 0    Incontinence Supply Disposable (INCONTINENCE BRIEF LARGE) MISC, To use as directed. Use 3x a day, Disp: 90 each, Rfl: 3    polyethylene glycol (GLYCOLAX) powder, Take 17 g by mouth daily, Disp: 527 g, Rfl: 3    Foot Care Products (TRI-BALANCE ORTHOTICS MENS) MISC, Provide insurance covered orthotics shoes, wear daily, Disp: 2 each, Rfl: 0    albuterol (PROVENTIL) 2 MG tablet, Use as needed, Disp: 1 tablet, Rfl: 0    ipratropium-albuterol (DUONEB) 0.5-2.5 (3) MG/3ML SOLN nebulizer solution, Inhale 1 vial into the lungs every 4 hours as needed , Disp: , Rfl:     Armodafinil 200 MG TABS, Take 1 tablet by mouth 2 times daily.  , Disp: , Rfl:     ALLERGIES:   Allergies   Allergen Reactions    Darvocet [Propoxyphene N-Acetaminophen] Hives    Other Hives    Oxycodone-Acetaminophen     Propoxyphene      Other reaction(s): Unknown       FAMILY HISTORY:       Problem Relation Age of Onset    Cancer Mother     Heart Attack Father          SOCIAL HISTORY:   Social History     Socioeconomic History    Marital status:      Spouse name: Not on file    Number of children: Not on file    Years of education: Not on file    Highest education level: Not on file   Occupational History    Not on file   Social Needs    Financial resource strain: Not on file    Food insecurity     Worry: Not on file     Inability: Not on file    Transportation needs     Medical: Not on file     Non-medical: Not on file   Tobacco Use    Smoking status: Former Smoker     Packs/day: 1.00     Years: 20.00     Pack years: 20.00     Quit date: 1976     Years since quittin.2    Smokeless tobacco: Never Used   Substance and Sexual Activity    Alcohol use: No    Drug use: No    Sexual activity: Not on file   Lifestyle    Physical activity     Days per week: Not on file     Minutes per session: Not on file    Stress: Not on file   Relationships    Social connections     Talks on phone: Not on file     Gets together: Not on file     Attends Worship service: Not on file     Active member of club or organization: Not on file     Attends meetings of clubs or organizations: Not on file     Relationship status: Not on file    Intimate partner violence     Fear of current or ex partner: Not on file     Emotionally abused: Not on file     Physically abused: Not on file     Forced sexual activity: Not on file   Other Topics Concern    Not on file   Social History Narrative    Not on file       REVIEW OF SYSTEMS: A 12-point review of systemswas obtained and pertinent positives and negatives were enumerated above in the history of present illness. All other reviewed systems / symptoms were negative.     Review of Systems        LABORATORY DATA: Reviewed  Lab Results   Component Value Date    WBC 4.8 11/02/2020    HGB 10.1 (L) 11/02/2020    HCT 33.5 (L) 11/02/2020    .0 11/02/2020     11/02/2020     11/12/2020    K 3.9 11/12/2020     11/12/2020    CO2 28 11/12/2020    BUN 13 11/12/2020    CREATININE 0.74 02/05/2021    LABALBU 4.0 10/11/2020    BILITOT 1.12 10/11/2020    ALKPHOS 126 10/11/2020    AST 19 10/11/2020    ALT 9 10/11/2020    INR 1.3 10/11/2020         Lab Results   Component Value Date    RBC 3.35 (L) 11/02/2020    HGB 10.1 (L) 11/02/2020    .0 11/02/2020    MCH 30.1 11/02/2020    MCHC 30.1 11/02/2020    RDW 14.8 (H) 11/02/2020    MPV 8.7 11/02/2020    BASOPCT 1 10/14/2020    LYMPHSABS 0.73 (L) 10/14/2020    MONOSABS 0.70 10/14/2020    NEUTROABS 3.17 10/14/2020    EOSABS 0.31 10/14/2020    BASOSABS 0.03 10/14/2020         DIAGNOSTIC TESTING:     Xr Chest (2 Vw)    Result Date: 2/26/2021  EXAMINATION: TWO XRAY VIEWS OF THE CHEST 2/26/2021 12:29 pm COMPARISON: 10/23/2020 HISTORY: ORDERING SYSTEM PROVIDED HISTORY: Chronic obstructive pulmonary disease, unspecified COPD type ([facility] TECHNOLOGIST PROVIDED HISTORY: Reason for Exam: cough FINDINGS: Mild cardiomegaly was noted without pneumonia, interstitial edema or pleural effusion. Unchanged elevation of the right hemidiaphragm was noted. 1 metallic clip was noted in the left shoulder secondary to prior left rotator cuff repair. Mild cardiomegaly without pneumonia, interstitial edema or pleural effusion. Unchanged elevation of the right hemidiaphragm. No significant change from 10/23/2020. PHYSICAL EXAMINATION: Vital signs reviewed per the nursing documentation. There were no vitals taken for this visit. There is no height or weight on file to calculate BMI. Physical Exam      I personally reviewed the nurse's notes and documentation and I agree with her notes.     General: alert, appears stated age and cooperative Psych: Normal. and Alert and

## 2021-03-12 ENCOUNTER — OFFICE VISIT (OUTPATIENT)
Dept: PRIMARY CARE CLINIC | Age: 76
End: 2021-03-12
Payer: MEDICARE

## 2021-03-12 VITALS
BODY MASS INDEX: 29.99 KG/M2 | WEIGHT: 212 LBS | SYSTOLIC BLOOD PRESSURE: 89 MMHG | DIASTOLIC BLOOD PRESSURE: 60 MMHG | OXYGEN SATURATION: 100 % | TEMPERATURE: 96.8 F | HEART RATE: 53 BPM

## 2021-03-12 DIAGNOSIS — Z76.0 MEDICATION REFILL: ICD-10-CM

## 2021-03-12 DIAGNOSIS — R39.11 BENIGN PROSTATIC HYPERPLASIA WITH URINARY HESITANCY: ICD-10-CM

## 2021-03-12 DIAGNOSIS — E66.3 OVERWEIGHT (BMI 25.0-29.9): ICD-10-CM

## 2021-03-12 DIAGNOSIS — M79.671 RIGHT FOOT PAIN: Primary | ICD-10-CM

## 2021-03-12 DIAGNOSIS — M79.10 MYALGIA: ICD-10-CM

## 2021-03-12 DIAGNOSIS — R09.89 CHEST CONGESTION: ICD-10-CM

## 2021-03-12 DIAGNOSIS — N40.1 BENIGN PROSTATIC HYPERPLASIA WITH URINARY HESITANCY: ICD-10-CM

## 2021-03-12 DIAGNOSIS — I50.32 CHRONIC DIASTOLIC HEART FAILURE (HCC): ICD-10-CM

## 2021-03-12 PROCEDURE — G8417 CALC BMI ABV UP PARAM F/U: HCPCS | Performed by: PHYSICIAN ASSISTANT

## 2021-03-12 PROCEDURE — 1123F ACP DISCUSS/DSCN MKR DOCD: CPT | Performed by: PHYSICIAN ASSISTANT

## 2021-03-12 PROCEDURE — 3017F COLORECTAL CA SCREEN DOC REV: CPT | Performed by: PHYSICIAN ASSISTANT

## 2021-03-12 PROCEDURE — G8484 FLU IMMUNIZE NO ADMIN: HCPCS | Performed by: PHYSICIAN ASSISTANT

## 2021-03-12 PROCEDURE — 1036F TOBACCO NON-USER: CPT | Performed by: PHYSICIAN ASSISTANT

## 2021-03-12 PROCEDURE — 4040F PNEUMOC VAC/ADMIN/RCVD: CPT | Performed by: PHYSICIAN ASSISTANT

## 2021-03-12 PROCEDURE — 99214 OFFICE O/P EST MOD 30 MIN: CPT | Performed by: PHYSICIAN ASSISTANT

## 2021-03-12 PROCEDURE — G8427 DOCREV CUR MEDS BY ELIG CLIN: HCPCS | Performed by: PHYSICIAN ASSISTANT

## 2021-03-12 RX ORDER — TAMSULOSIN HYDROCHLORIDE 0.4 MG/1
0.4 CAPSULE ORAL DAILY
Qty: 30 CAPSULE | Refills: 5 | Status: SHIPPED | OUTPATIENT
Start: 2021-03-12 | End: 2021-10-19 | Stop reason: SDUPTHER

## 2021-03-12 ASSESSMENT — ENCOUNTER SYMPTOMS
CONSTIPATION: 1
BLOOD IN STOOL: 1

## 2021-03-12 NOTE — PROGRESS NOTES
[facility]  [id number] [address]  Dept: [phone]  Dept Fax: [phone]    Office Progress/Follow Up Note    Date of patient's visit: 3/12/2021    Patient's Name:  Sandra Velasquez YOB: 1945            Patient Care Team:  Jerman Kaiser PA-C as PCP - General (Physician Assistant)  Jerman Kaiser PA-C as PCP - [facility] [address] [facility] MD Lesia as Consulting Physician (Gastroenterology)  Farnaz Mccrary MD as Consulting Physician (Cardiology)  Bibi Bhakta MD as Consulting Physician (Pulmonology)  Elise Dorantes MD as Consulting Physician (Urology)  ================================================================    REASON FOR VISIT/CHIEF COMPLAINT:  Follow-up (4M), Other (discuss colonoscopy), and Foot Problem (Rt, 2nd toe, states dropped 2 liter bottle about 2 weeks ago)    HISTORY OF PRESENTING ILLNESS:  History was obtained from: patient. Sandra Velasquez is a 77 y.o. is here for follow-up for CHF, medication refill, complaining of right foot pain. Patient wife is present. Patient states he is compliant with medications. Foot Problem  This is a new problem. The current episode started 1 to 4 weeks ago. The problem occurs daily. The problem has been unchanged. Associated symptoms include myalgias. Pertinent negatives include no chest pain, chills, congestion, coughing, fever, headaches, nausea, neck pain or vomiting. Nothing aggravates the symptoms. He has tried nothing for the symptoms. The treatment provided moderate relief. Patient states \"dropped 2 L bottle on\" right foot \"couple weeks ago\". Patient states bruising \"still present\". Patient states \"some\" pain. Patient does confirm \"looks much better\". Upon physical examination, bruising over right toes, mild pain.   Informed patient to contact PCP office if bruising hesitancy    Myalgia       Health Maintenance Due   Topic Date Due    COVID-19 Vaccine (1) Never done    Shingles Vaccine (1 of 2) Never done   [employer] Visit (AWV)  Never done       Allergies   Allergen Reactions    Darvocet [Propoxyphene N-Acetaminophen] Hives    Other Hives    Oxycodone-Acetaminophen     Propoxyphene      Other reaction(s): Unknown         Current Outpatient Medications   Medication Sig Dispense Refill    tamsulosin (FLOMAX) 0.4 MG capsule Take 1 capsule by mouth daily 30 capsule 5    bisacodyl (DULCOLAX) 5 MG EC tablet TAKE 4 TABS AS DIRECTED BY PHYSICIAN OFFICE 4 tablet 0    polyethylene glycol (GLYCOLAX) 17 GM/SCOOP powder Use as directed by following your patient instructions given by office.  238 g 0    bumetanide (BUMEX) 1 MG tablet Take 2 mg of Bumex in morning, 1 mg of Bumex in afternoon, take 1 mg in evening if weight is >3 above baseline weight 120 tablet 0    metoprolol succinate (TOPROL XL) 25 MG extended release tablet TAKE ONE TABLET BY MOUTH TWICE DAILY 56 tablet 2    pantoprazole (PROTONIX) 40 MG tablet TAKE 1 TABLET BY MOUTH DAILY 28 tablet 3    XARELTO 20 MG TABS tablet TAKE 1 TABLET BY MOUTH DAILY 28 tablet 3    CREON [id number] units delayed release capsule TAKE 1 CAPSULE BY MOUTH THREE TIMES DAILY WITH MEALS 84 capsule 3    rOPINIRole (REQUIP) 0.25 MG tablet TAKE 1 TABLET BY MOUTH TWICE DAILY 56 tablet 3    gabapentin (NEURONTIN) 300 MG capsule TAKE 1 CAPSULE BY MOUTH THREE TIMES DAILY 84 capsule 2    tiZANidine (ZANAFLEX) 4 MG tablet TAKE 1 TABLET BY MOUTH DAILY AS NEEDED 28 tablet 3    carboxymethylcellulose (REFRESH PLUS) 0.5 % SOLN ophthalmic solution Apply 1 drop to eye 4 times daily      Menthol-Methyl Salicylate (THERA-GESIC) 0.5-15 % CREA Apply topically 2 times daily as needed      potassium chloride (KLOR-CON M) 20 MEQ extended release tablet Take 1 tablet by mouth 2 times daily 56 tablet 5    Multiple Vitamin (MULTI-VITAMINS) TABS Take 1 tablet by mouth daily 28 tablet 5    cetirizine (ZYRTEC) 10 MG tablet TAKE 1 TABLET BY MOUTH DAILY 28 tablet 5    fluticasone (FLONASE) 50 MCG/ACT nasal spray 2 sprays by Nasal route daily 16 g 3    ascorbic acid (VITAMIN C) 500 MG tablet Take 1 tablet by mouth daily 30 tablet 2    ferrous sulfate (FE TABS 325) 325 (65 Fe) MG EC tablet Take 1 tablet by mouth 2 times daily (with meals) 90 tablet 3    vitamin B-12 (CYANOCOBALAMIN) 100 MCG tablet TAKE 1 TABLET BY MOUTH DAILY AS NEEDED FOR FATIGUE 30 tablet 5    D-Mannose 500 MG CAPS Take 500 mg by mouth 3 times daily 90 capsule 11    SM LUBRICANT EYE DROPS 0.4-0.3 % ophthalmic solution PLACE 1 DROP INTO BOTH EYES FOUR TIMES DAILY AS NEEDED FOR DRY EYES 15 mL 3    Fluticasone furoate-vilanterol (BREO ELLIPTA) 200-25 MCG/INH AEPB inhaler Inhale 1 puff into the lungs daily      Handicap Placard MISC by Does not apply route 1 each 0    Incontinence Supply Disposable (INCONTINENCE BRIEF LARGE) MISC To use as directed. Use 3x a day 90 each 3    Foot Care Products (TRI-BALANCE ORTHOTICS MENS) MISC Provide insurance covered orthotics shoes, wear daily 2 each 0    albuterol (PROVENTIL) 2 MG tablet Use as needed 1 tablet 0    ipratropium-albuterol (DUONEB) 0.5-2.5 (3) MG/3ML SOLN nebulizer solution Inhale 1 vial into the lungs every 4 hours as needed       Armodafinil 200 MG TABS Take 1 tablet by mouth 2 times daily. No current facility-administered medications for this visit. Social History     Tobacco Use    Smoking status: Former Smoker     Packs/day: 1.00     Years: 20.00     Pack years: 20.00     Quit date: 1976     Years since quittin.2    Smokeless tobacco: Never Used   Substance Use Topics    Alcohol use: No    Drug use: No       Family History   Problem Relation Age of Onset    Cancer Mother     Heart Attack Father         REVIEW OFSYSTEMS:  Review of Systems   Constitutional: Negative for chills and fever.    HENT: Negative for congestion. Respiratory: Negative for cough, shortness of breath and wheezing. Cardiovascular: Negative for chest pain. Gastrointestinal: Positive for blood in stool and constipation. Negative for diarrhea, nausea and vomiting. Genitourinary: Negative for dysuria, frequency and urgency. Musculoskeletal: Positive for myalgias. Negative for back pain and neck pain. Neurological: Negative for headaches. PHYSICAL EXAM:  Vitals:    03/12/21 0915   BP: 89/60   Site: Right Upper Arm   Position: Sitting   Cuff Size: Large Adult   Pulse: 53   Temp: 96.8 °F (36 °C)   TempSrc: Temporal   SpO2: 100%   Weight: 212 lb (96.2 kg)     BP Readings from Last 3 Encounters:   03/12/21 89/60   03/10/21 112/67   01/14/21 118/74        Physical Exam  Vitals signs reviewed. Constitutional:       Appearance: Normal appearance. He is well-developed, well-groomed and overweight. HENT:      Head: Normocephalic and atraumatic. Right Ear: External ear normal.      Left Ear: External ear normal.      Nose: Nose normal.   Eyes:      General: Lids are normal.      Conjunctiva/sclera: Conjunctivae normal.      Pupils: Pupils are equal, round, and reactive to light. Cardiovascular:      Rate and Rhythm: Normal rate and regular rhythm. Heart sounds: Normal heart sounds. Pulmonary:      Effort: Pulmonary effort is normal.      Breath sounds: Normal breath sounds. Abdominal:      Palpations: Abdomen is soft. There is no mass. Tenderness: There is no abdominal tenderness. Musculoskeletal:         General: No tenderness. Right foot: Bunion present. Feet:      Right foot:      Skin integrity: Erythema present. Skin:     General: Skin is warm and dry. Neurological:      Mental Status: He is alert and oriented to person, place, and time. Psychiatric:         Behavior: Behavior is cooperative.            DIAGNOSTIC FINDINGS:  CBC:  Lab Results   Component Value Date    WBC 4.8 11/02/2020 HGB 10.1 11/02/2020     11/02/2020     09/13/2011       BMP:    Lab Results   Component Value Date     11/12/2020    K 3.9 11/12/2020     11/12/2020    CO2 28 11/12/2020    BUN 13 11/12/2020    CREATININE 0.74 02/05/2021    GLUCOSE 86 11/12/2020    GLUCOSE 99 09/13/2011       HEMOGLOBIN A1C: No results found for: LABA1C    FASTING LIPID PANEL:  Lab Results   Component Value Date    CHOL 157 07/06/2018    HDL 62 06/24/2019    TRIG 101 07/06/2018       ASSESSMENT AND PLAN:  Elliott Carrera was seen today for follow-up, other and foot problem. Diagnoses and all orders for this visit:    Right foot pain    Chronic diastolic heart failure ([facility]  -     Basic Metabolic Panel; Future    Benign prostatic hyperplasia with urinary hesitancy  -     tamsulosin (FLOMAX) 0.4 MG capsule; Take 1 capsule by mouth daily    Chest congestion    Overweight (BMI 25.0-29. 9)    Myalgia    Medication refill  -     tamsulosin (FLOMAX) 0.4 MG capsule; Take 1 capsule by mouth daily      FOLLOW UP AND INSTRUCTIONS:  Return if symptoms worsen or fail to improve. · Discussed use, benefit, and side effects of prescribed medications. Barriers to medication compliance addressed. All patient questions answered. Pt voiced understanding. · Patient given educational materials - see patient instructions    Electronically signed by Michelle Payne PA-C on 3/12/21 at 9:25 AM EST    This note is created with the assistance of a speech-recognition program. While intending to generate a document that actually reflects the content of the visit, the document can still have some mistakes which may not have been identified and corrected by editing.

## 2021-03-12 NOTE — PATIENT INSTRUCTIONS
· Inform GI of colonoscopy in 2016 by Dr. Ted Ibarra at [facility], records have been update in chart  · Contact PCP office if sore in foot develop  · Get labs done after seeing cardiology later this month

## 2021-03-22 ENCOUNTER — TELEPHONE (OUTPATIENT)
Dept: PRIMARY CARE CLINIC | Age: 76
End: 2021-03-22

## 2021-03-22 PROBLEM — R39.11 BENIGN PROSTATIC HYPERPLASIA WITH URINARY HESITANCY: Status: ACTIVE | Noted: 2021-03-22

## 2021-03-22 PROBLEM — M79.10 MYALGIA: Status: ACTIVE | Noted: 2021-03-22

## 2021-03-22 PROBLEM — N40.1 BENIGN PROSTATIC HYPERPLASIA WITH URINARY HESITANCY: Status: ACTIVE | Noted: 2021-03-22

## 2021-03-22 ASSESSMENT — ENCOUNTER SYMPTOMS
NAUSEA: 0
WHEEZING: 0
VOMITING: 0
SHORTNESS OF BREATH: 0
BACK PAIN: 0
DIARRHEA: 0
COUGH: 0

## 2021-03-26 ENCOUNTER — TELEPHONE (OUTPATIENT)
Dept: GASTROENTEROLOGY | Age: 76
End: 2021-03-26

## 2021-03-26 DIAGNOSIS — R53.83 FATIGUE, UNSPECIFIED TYPE: ICD-10-CM

## 2021-03-26 NOTE — TELEPHONE ENCOUNTER
Talked to PCP office to follow up on Xarelto clearance. PCP has not signed and will be asked to review today 03/26/21.

## 2021-03-26 NOTE — TELEPHONE ENCOUNTER
E-scribe request for . Please review and e-scribe if applicable.      Last Visit Date:  3/12/21  Next Visit Date:  Visit date not found    No results found for: LABA1C          ( goal A1C is < 7)   No results found for: LABMICR  LDL Cholesterol (mg/dL)   Date Value   06/24/2019 65     LDL Calculated (mg/dL)   Date Value   10/30/2013 67       (goal LDL is <100)   AST (U/L)   Date Value   10/11/2020 19     ALT (U/L)   Date Value   10/11/2020 9     BUN (mg/dL)   Date Value   11/12/2020 13     BP Readings from Last 3 Encounters:   03/12/21 89/60   03/10/21 112/67   01/14/21 118/74          (goal 120/80)        Patient Active Problem List:     Atrial fibrillation (HCC)     Hyperlipidemia     GERD (gastroesophageal reflux disease)     Osteoarthritis of lumbar spine     OA (osteoarthritis) of knee, bilateral     Hallux valgus, acquired, bilateral     Hypertension     Slow transit constipation     MARIAMA on CPAP     Class 2 severe obesity due to excess calories with serious comorbidity and body mass index (BMI) of 36.0 to 36.9 in adult (HCC)     Simple chronic bronchitis (HCC)     Hospital-acquired bacterial pneumonia     Fluid overload     COPD (chronic obstructive pulmonary disease) ([facility])     Chronic diastolic heart failure ([facility] [address]     Status post gastroplasty     Benign prostatic hyperplasia with urinary hesitancy     Myalgia      ----Mirna Venegas

## 2021-03-27 RX ORDER — UBIDECARENONE 75 MG
CAPSULE ORAL
Qty: 30 TABLET | Refills: 5 | Status: SHIPPED | OUTPATIENT
Start: 2021-03-27 | End: 2022-01-25 | Stop reason: SDUPTHER

## 2021-03-29 RX ORDER — POLYETHYLENE GLYCOL 3350 17 G/17G
POWDER, FOR SOLUTION ORAL
Qty: 238 G | Refills: 0 | Status: ON HOLD | OUTPATIENT
Start: 2021-03-29 | End: 2021-04-08 | Stop reason: ALTCHOICE

## 2021-03-29 NOTE — TELEPHONE ENCOUNTER
Patient called office stating that the pharmacy does not have his prep. Please resend and advise the patient when completed. Also, had questions about his blood thinners.

## 2021-03-29 NOTE — TELEPHONE ENCOUNTER
Nicole Dc called to ask when he should stop taking Xarelto. Writer informed him PCP office has not responding to 2 of our requests. Writer will follow up today, 03/29/21. Nicole Dc also asked if the bowel prep had been sent to the pharmacy and writer confirmed it was send on 03/10/21 to [facility].

## 2021-03-29 NOTE — TELEPHONE ENCOUNTER
Talked to Devorah at [facility] office. She states he will sign the clearance and send today, 03/29/21.

## 2021-03-31 DIAGNOSIS — R09.81 SINUS CONGESTION: ICD-10-CM

## 2021-03-31 DIAGNOSIS — Z76.0 MEDICATION REFILL: ICD-10-CM

## 2021-03-31 DIAGNOSIS — G62.9 NEUROPATHY: ICD-10-CM

## 2021-03-31 RX ORDER — GABAPENTIN 300 MG/1
CAPSULE ORAL
Qty: 84 CAPSULE | Refills: 2 | Status: SHIPPED | OUTPATIENT
Start: 2021-03-31 | End: 2021-06-29 | Stop reason: SDUPTHER

## 2021-03-31 RX ORDER — BENZONATATE 100 MG/1
CAPSULE ORAL
Qty: 30 CAPSULE | Refills: 3 | Status: SHIPPED | OUTPATIENT
Start: 2021-03-31 | End: 2022-01-26

## 2021-03-31 NOTE — TELEPHONE ENCOUNTER
Health Maintenance   Topic Date Due    COVID-19 Vaccine (1) Never done    Shingles Vaccine (1 of 2) Never done   [employer] Visit (AWV)  Never done    Flu vaccine (1) 11/05/2021 (Originally 9/1/2020)    Potassium monitoring  11/12/2021    Creatinine monitoring  02/05/2022    Lipid screen  06/24/2024    Colon cancer screen colonoscopy  12/19/2026    DTaP/Tdap/Td vaccine (2 - Td) 04/22/2029    Pneumococcal 65+ years Vaccine  Completed    AAA screen  Completed    Hepatitis C screen  Completed    Hepatitis A vaccine  Aged Out    Hepatitis B vaccine  Aged Out    Hib vaccine  Aged Out    Meningococcal (ACWY) vaccine  Aged Out             (applicable per patient's age: Cancer Screenings, Depression Screening, Fall Risk Screening, Immunizations)    LDL Cholesterol (mg/dL)   Date Value   06/24/2019 65     LDL Calculated (mg/dL)   Date Value   10/30/2013 67     AST (U/L)   Date Value   10/11/2020 19     ALT (U/L)   Date Value   10/11/2020 9     BUN (mg/dL)   Date Value   11/12/2020 13      (goal A1C is < 7)   (goal LDL is <100) need 30-50% reduction from baseline     BP Readings from Last 3 Encounters:   03/12/21 89/60   03/10/21 112/67   01/14/21 118/74    (goal /80)      All Future Testing planned in [facility]:  Lab Frequency Next Occurrence   Basic Metabolic Panel Once 72/83/2250   COVID-19 Once 03/22/2021       Next Visit Date:  Future Appointments   Date Time Provider Jeri Flynn   4/1/2021 12:00 PM SCHEDULE, MANAS DORADOID SCREENING MANAS [facility]            Patient Active Problem List:     Atrial fibrillation (HCC)     Hyperlipidemia     GERD (gastroesophageal reflux disease)     Osteoarthritis of lumbar spine     OA (osteoarthritis) of knee, bilateral     Hallux valgus, acquired, bilateral     Hypertension     Slow transit constipation     MARIAMA on CPAP     Class 2 severe obesity due to excess calories with serious comorbidity and body mass index (BMI) of 36.0 to 36.9 in [facility]) Simple chronic bronchitis (HCC)     Hospital-acquired bacterial pneumonia     Fluid overload     COPD (chronic obstructive pulmonary disease) (HCC)     Chronic diastolic heart failure (HCC)     Status post gastroplasty     Benign prostatic hyperplasia with urinary hesitancy     Myalgia

## 2021-03-31 NOTE — TELEPHONE ENCOUNTER
Health Maintenance   Topic Date Due    COVID-19 Vaccine (1) Never done    Shingles Vaccine (1 of 2) Never done   [employer] Visit (AWV)  Never done    Flu vaccine (1) 11/05/2021 (Originally 9/1/2020)    Potassium monitoring  11/12/2021    Creatinine monitoring  02/05/2022    Lipid screen  06/24/2024    Colon cancer screen colonoscopy  12/19/2026    DTaP/Tdap/Td vaccine (2 - Td) 04/22/2029    Pneumococcal 65+ years Vaccine  Completed    AAA screen  Completed    Hepatitis C screen  Completed    Hepatitis A vaccine  Aged Out    Hepatitis B vaccine  Aged Out    Hib vaccine  Aged Out    Meningococcal (ACWY) vaccine  Aged Out             (applicable per patient's age: Cancer Screenings, Depression Screening, Fall Risk Screening, Immunizations)    LDL Cholesterol (mg/dL)   Date Value   06/24/2019 65     LDL Calculated (mg/dL)   Date Value   10/30/2013 67     AST (U/L)   Date Value   10/11/2020 19     ALT (U/L)   Date Value   10/11/2020 9     BUN (mg/dL)   Date Value   11/12/2020 13      (goal A1C is < 7)   (goal LDL is <100) need 30-50% reduction from baseline     BP Readings from Last 3 Encounters:   03/12/21 89/60   03/10/21 112/67   01/14/21 118/74    (goal /80)      All Future Testing planned in [facility]:  Lab Frequency Next Occurrence   Basic Metabolic Panel Once 06/50/8886   COVID-19 Once 03/22/2021       Next Visit Date:  Future Appointments   Date Time Provider Jeri Flynn   4/1/2021 12:00 PM SCHEDULE, MANAS DORADOID SCREENING MANAS [facility]            Patient Active Problem List:     Atrial fibrillation (HCC)     Hyperlipidemia     GERD (gastroesophageal reflux disease)     Osteoarthritis of lumbar spine     OA (osteoarthritis) of knee, bilateral     Hallux valgus, acquired, bilateral     Hypertension     Slow transit constipation     MARIAMA on CPAP     Class 2 severe obesity due to excess calories with serious comorbidity and body mass index (BMI) of 36.0 to 36.9 in [facility]) Simple chronic bronchitis (HCC)     Hospital-acquired bacterial pneumonia     Fluid overload     COPD (chronic obstructive pulmonary disease) (HCC)     Chronic diastolic heart failure (HCC)     Status post gastroplasty     Benign prostatic hyperplasia with urinary hesitancy     Myalgia

## 2021-04-01 ENCOUNTER — HOSPITAL ENCOUNTER (OUTPATIENT)
Age: 76
Discharge: HOME | End: 2021-04-01
Payer: MEDICARE

## 2021-04-01 ENCOUNTER — HOSPITAL ENCOUNTER (OUTPATIENT)
Dept: LAB | Age: 76
Setting detail: SPECIMEN
Discharge: HOME | End: 2021-04-01
Payer: MEDICARE

## 2021-04-01 DIAGNOSIS — I50.32 CHRONIC DIASTOLIC HEART FAILURE (HCC): ICD-10-CM

## 2021-04-01 DIAGNOSIS — Z01.818 PREOP TESTING: Primary | ICD-10-CM

## 2021-04-01 LAB
ANION GAP SERPL CALCULATED.3IONS-SCNC: 14 MMOL/L (ref 9–17)
BUN BLDV-MCNC: 21 MG/DL (ref 8–23)
BUN/CREAT BLD: NORMAL (ref 9–20)
CALCIUM SERPL-MCNC: 9.1 MG/DL (ref 8.6–10.4)
CHLORIDE BLD-SCNC: 100 MMOL/L (ref 98–107)
CHOLESTEROL, FASTING: 130 MG/DL
CHOLESTEROL/HDL RATIO: 1.8
CO2: 27 MMOL/L (ref 20–31)
CREAT SERPL-MCNC: 0.9 MG/DL (ref 0.7–1.2)
GFR AFRICAN AMERICAN: >60 ML/MIN
GFR NON-AFRICAN AMERICAN: >60 ML/MIN
GFR SERPL CREATININE-BSD FRML MDRD: NORMAL ML/MIN/{1.73_M2}
GFR SERPL CREATININE-BSD FRML MDRD: NORMAL ML/MIN/{1.73_M2}
GLUCOSE BLD-MCNC: 98 MG/DL (ref 70–99)
HDLC SERPL-MCNC: 72 MG/DL
LDL CHOLESTEROL: 49 MG/DL (ref 0–130)
POTASSIUM SERPL-SCNC: 3.9 MMOL/L (ref 3.7–5.3)
SODIUM BLD-SCNC: 141 MMOL/L (ref 135–144)
TRIGLYCERIDE, FASTING: 43 MG/DL
VLDLC SERPL CALC-MCNC: NORMAL MG/DL (ref 1–30)

## 2021-04-01 PROCEDURE — U0005 INFEC AGEN DETEC AMPLI PROBE: HCPCS

## 2021-04-01 PROCEDURE — 80048 BASIC METABOLIC PNL TOTAL CA: CPT

## 2021-04-01 PROCEDURE — 36415 COLL VENOUS BLD VENIPUNCTURE: CPT

## 2021-04-01 PROCEDURE — U0003 INFECTIOUS AGENT DETECTION BY NUCLEIC ACID (DNA OR RNA); SEVERE ACUTE RESPIRATORY SYNDROME CORONAVIRUS 2 (SARS-COV-2) (CORONAVIRUS DISEASE [COVID-19]), AMPLIFIED PROBE TECHNIQUE, MAKING USE OF HIGH THROUGHPUT TECHNOLOGIES AS DESCRIBED BY CMS-2020-01-R: HCPCS

## 2021-04-01 PROCEDURE — 80061 LIPID PANEL: CPT

## 2021-04-02 ENCOUNTER — TELEPHONE (OUTPATIENT)
Dept: PRIMARY CARE CLINIC | Age: 76
End: 2021-04-02

## 2021-04-02 LAB
SARS-COV-2: NORMAL
SARS-COV-2: NOT DETECTED
SOURCE: NORMAL

## 2021-04-05 ENCOUNTER — HOSPITAL ENCOUNTER (OUTPATIENT)
Age: 76
Setting detail: OUTPATIENT SURGERY
Discharge: HOME | DRG: 243 | End: 2021-04-05
Attending: INTERNAL MEDICINE | Admitting: INTERNAL MEDICINE
Payer: MEDICARE

## 2021-04-05 ENCOUNTER — ANESTHESIA (OUTPATIENT)
Dept: OPERATING ROOM | Age: 76
DRG: 243 | End: 2021-04-05
Payer: MEDICARE

## 2021-04-05 ENCOUNTER — ANESTHESIA EVENT (OUTPATIENT)
Dept: OPERATING ROOM | Age: 76
DRG: 243 | End: 2021-04-05
Payer: MEDICARE

## 2021-04-05 VITALS
TEMPERATURE: 98.2 F | DIASTOLIC BLOOD PRESSURE: 70 MMHG | OXYGEN SATURATION: 100 % | RESPIRATION RATE: 16 BRPM | SYSTOLIC BLOOD PRESSURE: 122 MMHG

## 2021-04-05 VITALS
WEIGHT: 214 LBS | RESPIRATION RATE: 12 BRPM | HEIGHT: 70 IN | BODY MASS INDEX: 30.64 KG/M2 | SYSTOLIC BLOOD PRESSURE: 120 MMHG | DIASTOLIC BLOOD PRESSURE: 54 MMHG | HEART RATE: 61 BPM | OXYGEN SATURATION: 100 % | TEMPERATURE: 97.3 F

## 2021-04-05 PROCEDURE — 7100000011 HC PHASE II RECOVERY - ADDTL 15 MIN: Performed by: INTERNAL MEDICINE

## 2021-04-05 PROCEDURE — 2580000003 HC RX 258: Performed by: ANESTHESIOLOGY

## 2021-04-05 PROCEDURE — 45378 DIAGNOSTIC COLONOSCOPY: CPT | Performed by: INTERNAL MEDICINE

## 2021-04-05 PROCEDURE — 7100000010 HC PHASE II RECOVERY - FIRST 15 MIN: Performed by: INTERNAL MEDICINE

## 2021-04-05 PROCEDURE — 2709999900 HC NON-CHARGEABLE SUPPLY: Performed by: INTERNAL MEDICINE

## 2021-04-05 PROCEDURE — 3700000000 HC ANESTHESIA ATTENDED CARE: Performed by: INTERNAL MEDICINE

## 2021-04-05 PROCEDURE — 0DJD8ZZ INSPECTION OF LOWER INTESTINAL TRACT, VIA NATURAL OR ARTIFICIAL OPENING ENDOSCOPIC: ICD-10-PCS | Performed by: INTERNAL MEDICINE

## 2021-04-05 PROCEDURE — 3609027000 HC COLONOSCOPY: Performed by: INTERNAL MEDICINE

## 2021-04-05 PROCEDURE — 3700000001 HC ADD 15 MINUTES (ANESTHESIA): Performed by: INTERNAL MEDICINE

## 2021-04-05 PROCEDURE — 2580000003 HC RX 258: Performed by: NURSE ANESTHETIST, CERTIFIED REGISTERED

## 2021-04-05 PROCEDURE — 2500000003 HC RX 250 WO HCPCS: Performed by: NURSE ANESTHETIST, CERTIFIED REGISTERED

## 2021-04-05 PROCEDURE — 6360000002 HC RX W HCPCS: Performed by: NURSE ANESTHETIST, CERTIFIED REGISTERED

## 2021-04-05 RX ORDER — HYDRALAZINE HYDROCHLORIDE 20 MG/ML
5 INJECTION INTRAMUSCULAR; INTRAVENOUS EVERY 10 MIN PRN
Status: DISCONTINUED | OUTPATIENT
Start: 2021-04-05 | End: 2021-04-05 | Stop reason: HOSPADM

## 2021-04-05 RX ORDER — LIDOCAINE HYDROCHLORIDE 20 MG/ML
INJECTION, SOLUTION EPIDURAL; INFILTRATION; INTRACAUDAL; PERINEURAL PRN
Status: DISCONTINUED | OUTPATIENT
Start: 2021-04-05 | End: 2021-04-05 | Stop reason: SDUPTHER

## 2021-04-05 RX ORDER — FENTANYL CITRATE 50 UG/ML
25 INJECTION, SOLUTION INTRAMUSCULAR; INTRAVENOUS EVERY 5 MIN PRN
Status: DISCONTINUED | OUTPATIENT
Start: 2021-04-05 | End: 2021-04-05 | Stop reason: HOSPADM

## 2021-04-05 RX ORDER — PROPOFOL 10 MG/ML
INJECTION, EMULSION INTRAVENOUS CONTINUOUS PRN
Status: DISCONTINUED | OUTPATIENT
Start: 2021-04-05 | End: 2021-04-05 | Stop reason: SDUPTHER

## 2021-04-05 RX ORDER — ONDANSETRON 2 MG/ML
4 INJECTION INTRAMUSCULAR; INTRAVENOUS
Status: DISCONTINUED | OUTPATIENT
Start: 2021-04-05 | End: 2021-04-05 | Stop reason: HOSPADM

## 2021-04-05 RX ORDER — SODIUM CHLORIDE 9 MG/ML
INJECTION, SOLUTION INTRAVENOUS CONTINUOUS
Status: DISCONTINUED | OUTPATIENT
Start: 2021-04-05 | End: 2021-04-05 | Stop reason: HOSPADM

## 2021-04-05 RX ORDER — SODIUM CHLORIDE 0.9 % (FLUSH) 0.9 %
10 SYRINGE (ML) INJECTION PRN
Status: DISCONTINUED | OUTPATIENT
Start: 2021-04-05 | End: 2021-04-05 | Stop reason: HOSPADM

## 2021-04-05 RX ORDER — GLYCOPYRROLATE 1 MG/5 ML
SYRINGE (ML) INTRAVENOUS PRN
Status: DISCONTINUED | OUTPATIENT
Start: 2021-04-05 | End: 2021-04-05 | Stop reason: SDUPTHER

## 2021-04-05 RX ORDER — PROMETHAZINE HYDROCHLORIDE 25 MG/ML
6.25 INJECTION, SOLUTION INTRAMUSCULAR; INTRAVENOUS
Status: DISCONTINUED | OUTPATIENT
Start: 2021-04-05 | End: 2021-04-05 | Stop reason: HOSPADM

## 2021-04-05 RX ORDER — SODIUM CHLORIDE, SODIUM LACTATE, POTASSIUM CHLORIDE, CALCIUM CHLORIDE 600; 310; 30; 20 MG/100ML; MG/100ML; MG/100ML; MG/100ML
INJECTION, SOLUTION INTRAVENOUS CONTINUOUS
Status: DISCONTINUED | OUTPATIENT
Start: 2021-04-05 | End: 2021-04-05 | Stop reason: HOSPADM

## 2021-04-05 RX ORDER — LABETALOL HYDROCHLORIDE 5 MG/ML
5 INJECTION, SOLUTION INTRAVENOUS EVERY 10 MIN PRN
Status: DISCONTINUED | OUTPATIENT
Start: 2021-04-05 | End: 2021-04-05 | Stop reason: HOSPADM

## 2021-04-05 RX ORDER — LIDOCAINE HYDROCHLORIDE 10 MG/ML
1 INJECTION, SOLUTION EPIDURAL; INFILTRATION; INTRACAUDAL; PERINEURAL
Status: DISCONTINUED | OUTPATIENT
Start: 2021-04-05 | End: 2021-04-05 | Stop reason: HOSPADM

## 2021-04-05 RX ORDER — SODIUM CHLORIDE 0.9 % (FLUSH) 0.9 %
10 SYRINGE (ML) INJECTION EVERY 12 HOURS SCHEDULED
Status: DISCONTINUED | OUTPATIENT
Start: 2021-04-05 | End: 2021-04-05 | Stop reason: HOSPADM

## 2021-04-05 RX ORDER — SODIUM CHLORIDE, SODIUM LACTATE, POTASSIUM CHLORIDE, CALCIUM CHLORIDE 600; 310; 30; 20 MG/100ML; MG/100ML; MG/100ML; MG/100ML
INJECTION, SOLUTION INTRAVENOUS CONTINUOUS PRN
Status: DISCONTINUED | OUTPATIENT
Start: 2021-04-05 | End: 2021-04-05 | Stop reason: SDUPTHER

## 2021-04-05 RX ADMIN — SODIUM CHLORIDE, POTASSIUM CHLORIDE, SODIUM LACTATE AND CALCIUM CHLORIDE: 600; 310; 30; 20 INJECTION, SOLUTION INTRAVENOUS at 10:10

## 2021-04-05 RX ADMIN — Medication 0.2 MG: at 10:18

## 2021-04-05 RX ADMIN — LIDOCAINE HYDROCHLORIDE 100 MG: 20 INJECTION, SOLUTION EPIDURAL; INFILTRATION; INTRACAUDAL; PERINEURAL at 10:20

## 2021-04-05 RX ADMIN — SODIUM CHLORIDE, POTASSIUM CHLORIDE, SODIUM LACTATE AND CALCIUM CHLORIDE: 600; 310; 30; 20 INJECTION, SOLUTION INTRAVENOUS at 09:36

## 2021-04-05 RX ADMIN — PROPOFOL 100 MCG/KG/MIN: 10 INJECTION, EMULSION INTRAVENOUS at 10:20

## 2021-04-05 ASSESSMENT — PAIN SCALES - GENERAL
PAINLEVEL_OUTOF10: 0
PAINLEVEL_OUTOF10: 0
PAINLEVEL_OUTOF10: 1
PAINLEVEL_OUTOF10: 0

## 2021-04-05 ASSESSMENT — PULMONARY FUNCTION TESTS
PIF_VALUE: 1

## 2021-04-05 ASSESSMENT — PAIN DESCRIPTION - DESCRIPTORS: DESCRIPTORS: ACHING

## 2021-04-05 NOTE — OP NOTE
[facility]     PROCEDURE DATE: 04/05/21    REFERRING PHYSICIAN: No ref. 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 male who presents to the [place] [id number] Endoscopy unit for colonoscopy. The patient's clinical history is remarkable for rectal bleeding. He is currently medically stable and appropriate for the planned procedure. PREOPERATIVE DIAGNOSIS: rectal bleeding. PROCEDURES:   Transanal Colonoscopy --diagnostic. POSTPROCEDURE DIAGNOSIS:  Small internal hemorrhoids  Mild diverticulosis    SPECIMENS: none    MEDICATIONS:     MAC per anesthesia    EBL: minimal    INSTRUMENT: Olympus PCF-H190 AL flexible Colonoscope. PREPARATION: The nature and character of the procedure as well as risks, benefits, and alternatives were discussed with the patient and informed consent was obtained. Complications were said to include, but were not limited to: medication allergy, medication reaction, cardiovascular and respiratory problems, bleeding, perforation, infection, and/or missed diagnosis. Following arrival in the endoscopy room, the patient was placed in the left lateral decubitus position and final time-out accomplished in the presence of the nursing staff. Baseline vital signs were obtained and reviewed, and IV sedation was subsequently initiated. FINDINGS: Rectal examination demonstrated no significant visible external abnormality and digital palpation was unremarkable. Following adequate conscious sedation the colonoscope was introduced and advanced under direct visualization to the cecum, which was identified by the ileocecal valve and appendiceal orifice. The bowel preparation was felt to be suboptimal. This included small amounts of thick stool that was not able to be adequately irrigated and aspirated. Cecal intubation time was 5 minutes.      Once maximally inserted, the endoscope was withdrawn and the mucosa was carefully inspected. The mucosal exam was normal.  Colon was redundant and tortuous. Mild left-sided diverticulosis was noted. Retroflexion was performed in the rectum and showed small internal hemorrhoids. RECOMMENDATIONS:   1) Follow up with referring provider, as previously scheduled. 2) Preparation H suppositories as needed for rectal bleeding. 3) Increase dietary fiber intake. 4) Repeat colonoscopy in 5 years as a general health condition dictates.       Maribel Hoffman

## 2021-04-05 NOTE — H&P
History and Physical Update    Pt Name: Maria Esther Phoenix  MRN: [id number]  YOB: 1945  Date of evaluation: 4/5/2021      [x] I have reviewed the progress note found in Epic by Dr. Christiano Henson from 03/10/2021 which meets the criteria for an Interval History and Physical note. [x] I have examined  Maria Esther Phoenix a 77 y.o., male who is scheduled for a diagnostic colonoscopy by Dr. Christiano Henson due to rectal bleeding. The patient denies health changes since his appointment with Dr. Christiano Henson on 03/10/2021. Pt denies fever, chills, productive cough, SOB, chest pain, open sores, rashes, and wounds. Pt denies history of diabetes. History of A-fib. Xarelto was last taken on 03/30/2021. Multiple vitamin was last taken on 04/05/2021. Vital signs: /63   Pulse 54   Temp 96 °F (35.6 °C) (Temporal)   Resp 20   Ht 5' 10\" (1.778 m)   Wt 214 lb (97.1 kg)   SpO2 98%   BMI 30.71 kg/m²      Allergies:  Darvocet [propoxyphene n-acetaminophen], Other, Oxycodone-acetaminophen, and Propoxyphene    Past medical history, surgical history, social history, and family history were reviewed and updated in EPIC as indicated. Medications:    Prior to Admission medications    Medication Sig Start Date End Date Taking? Authorizing Provider   gabapentin (NEURONTIN) 300 MG capsule TAKE 1 CAPSULE BY MOUTH THREE TIMES DAILY 3/31/21 6/29/21 Yes Yehuda Schaumann, PA-C   vitamin B-12 (CYANOCOBALAMIN) 100 MCG tablet TAKE 1 TABLET BY MOUTH DAILY AS NEEDED FOR FATIGUE 3/27/21  Yes Yehuda Schaumann, PA-C   tamsulosin [facility]) 0.4 MG capsule Take 1 capsule by mouth daily 3/12/21  Yes Yehuda Schaumann, PA-C   bisacodyl (DULCOLAX) 5 MG EC tablet TAKE 4 TABS AS DIRECTED BY PHYSICIAN OFFICE 3/10/21  Yes Azucena Parker MD   polyethylene glycol (GLYCOLAX) 17 GM/SCOOP powder Use as directed by following your patient instructions given by office.  3/10/21  Yes Azucena Parker MD   bumetanide (BUMEX) 1 MG tablet Take 2 mg of Bumex in morning, 1 mg of Bumex in afternoon, take 1 mg in evening if weight is >3 above baseline weight 3/6/21  Yes DARIANA GrahamC   metoprolol succinate (TOPROL XL) 25 MG extended release tablet TAKE ONE TABLET BY MOUTH TWICE DAILY 3/3/21  Yes BOZENA Graham-C   pantoprazole (PROTONIX) 40 MG tablet TAKE 1 TABLET BY MOUTH DAILY 2/3/21  Yes BOZENA Graham-C   CREON 93142-74690 units delayed release capsule TAKE 1 CAPSULE BY MOUTH THREE TIMES DAILY WITH MEALS 2/3/21  Yes BOZENA Graham-C   rOPINIRole (REQUIP) 0.25 MG tablet TAKE 1 TABLET BY MOUTH TWICE DAILY 2/3/21  Yes BOZENA Graham-ERASTO   tiZANidine (ZANAFLEX) 4 MG tablet TAKE 1 TABLET BY MOUTH DAILY AS NEEDED 1/7/21  Yes BOZENA Graham-C   carboxymethylcellulose (REFRESH PLUS) 0.5 % SOLN ophthalmic solution Apply 1 drop to eye 4 times daily   Yes Historical Provider, MD   potassium chloride (KLOR-CON M) 20 MEQ extended release tablet Take 1 tablet by mouth 2 times daily 12/3/20  Yes DARIANA GrahamC   Multiple Vitamin (MULTI-VITAMINS) TABS Take 1 tablet by mouth daily 12/3/20  Yes Rubina Kendall PA-C   cetirizine (ZYRTEC) 10 MG tablet TAKE 1 TABLET BY MOUTH DAILY 11/12/20  Yes DARIANA GrahamC   fluticasone Cherelle Uyen) 50 MCG/ACT nasal spray 2 sprays by Nasal route daily 11/5/20  Yes Rubina Kendall PA-C   ascorbic acid (VITAMIN C) 500 MG tablet Take 1 tablet by mouth daily 10/26/20  Yes BOZENA Graham-C   ferrous sulfate (FE TABS 325) 325 (65 Fe) MG EC tablet Take 1 tablet by mouth 2 times daily (with meals) 10/16/20  Yes Liliam Avery PA-C   SM LUBRICANT EYE DROPS 0.4-0.3 % ophthalmic solution PLACE 1 DROP INTO BOTH EYES FOUR TIMES DAILY AS NEEDED FOR DRY EYES 10/17/19  Yes Rubina Kendall PA-C   Fluticasone furoate-vilanterol (BREO ELLIPTA) 200-25 MCG/INH AEPB inhaler Inhale 1 puff into the lungs daily   Yes Historical Provider, MD   ipratropium-albuterol (DUONEB) 0.5-2.5 (3) MG/3ML SOLN nebulizer solution Inhale 1 vial into the lungs every 4 hours as needed    Yes Historical Provider, MD   benzonatate (TESSALON) 100 MG capsule TAKE 1 CAPSULE BY MOUTH THREE TIMES DAILY AS NEEDED FOR COUGH 3/31/21   Saintclair Bach, PA-C   polyethylene glycol [facility]) 17 GM/SCOOP powder Use as directed by following your patient instructions given by office. 3/29/21   Karyna Moseley MD   bisacodyl (DULCOLAX) 5 MG EC tablet TAKE 4 TABS AS DIRECTED BY PHYSICIAN OFFICE 3/29/21   Matthew Manning MD   magnesium citrate solution Take 296 mLs by mouth once for 1 dose 3/29/21 3/29/21  Matthew Manning MD   magnesium citrate solution Take 296 mLs by mouth once for 1 dose 3/29/21 3/29/21  Matthew Manning MD   XARELTO 20 MG TABS tablet TAKE 1 TABLET BY MOUTH DAILY 2/3/21   Saintclair Bach, PA-C   Menthol-Methyl Salicylate (THERA-GESIC) 0.5-15 % CREA Apply topically 2 times daily as needed 6/26/20   Historical Provider, MD   D-Mannose 500 MG CAPS Take 500 mg by mouth 3 times daily 11/11/19 3/12/21  Keren Zarco MD   Handicap Placard MISC by Does not apply route 6/27/19   Saintclair Bach, PA-C   Incontinence Supply Disposable (INCONTINENCE BRIEF LARGE) MISC To use as directed. Use 3x a day 4/30/19   Saintclair Bach, PA-C   Foot Care Products (TRI-BALANCE ORTHOTICS MENS) MISC Provide insurance covered orthotics shoes, wear daily 12/12/18   Saintclair Bach, PA-C   albuterol (PROVENTIL) 2 MG tablet Use as needed 10/4/18   Beth Jones MD   Armodafinil 200 MG TABS Take 1 tablet by mouth 2 times daily. 4/2/18   Historical Provider, MD       This is a 77 y.o. male who is pleasant, cooperative, alert and oriented x 3, in no acute distress. Obese. Multiple bruises on bilateral arms. Heart: Irregularly irregular. Asymptomatic bradycardia. HR 54 BPM. No murmur, gallop, or rub. Lungs: Normal respiratory effort, unlabored, and clear to auscultation without wheezes or rales bilaterally.    Abdomen: Soft, nontender, nondistended Asthma      Atrial fibrillation ([address]       On Xarelto 6/27/2020    Calculus of gallbladder without cholecystitis without obstruction 5/2/2017    Chronic diastolic heart failure ([address] 11/17/2017    Chronic rhinosinusitis 4/13/2015    Class 2 severe obesity due to excess calories with serious comorbidity and body mass index (BMI) of 36.0 to 36.9 in adult [facility]) 11/17/2017    COPD (chronic obstructive pulmonary disease) ([address]      E. coli septicemia ([facility])      E. coli UTI      Foot drop, right 7/10/2012    Fracture of femur ([address] 10/23/2016    Gait disorder 7/20/2016    Gastric out let obstruction      GERD (gastroesophageal reflux disease)      Hallux valgus, acquired, bilateral 6/24/2015    Hyperlipidemia      Hypertension      Impaired hearing 4/13/2015    Internal hemorrhoids 1/3/2017    Lymphedema of both lower extremities 6/24/2015    Nausea & vomiting 11/16/2018    OA (osteoarthritis) of knee, bilateral 12/11/2006    Obesity      MARIAMA on CPAP 5/2/2017    Osteoarthritis      Osteoarthritis of lumbar spine 12/13/2007      replace inactive diagnosis    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 coli ([address]       Due to UTI     Simple chronic bronchitis ([address] 4/10/2018    Slow transit constipation 11/3/2016    Unspecified sleep apnea      UTI (urinary tract infection)              Past Surgical History         Past Surgical History:   Procedure Laterality Date    CARPAL TUNNEL RELEASE         bilateral    COLONOSCOPY        CYSTOSCOPY   01/02/2019     by Dr. Saeid Corbin N/A 1/2/2019     CYSTOSCOPY performed by Rc Christensen MD at [facility]     first knuckle right index finger    GASTRIC BYPASS SURGERY   1985     vertical banded gastroplasty   [address] REPLACEMENT   2004 & 2005     bilateral knees    NM EGD FLEXIBLE FOREIGN BODY REMOVAL N/A 8/16/2018     EGD FOREIGN BODY REMOVAL performed by Matthew Graves MD at [address] [facility] EGD TRANSORAL BIOPSY SINGLE/MULTIPLE N/A 5/25/2018     EGD BIOPSY performed by Melody Rosa DO at [address] [facility]         left shoulder    UPPER GASTROINTESTINAL ENDOSCOPY   08/13/2018     removal of food bolus    UPPER GASTROINTESTINAL ENDOSCOPY N/A 8/13/2018     EGD FOREIGN BODY REMOVAL performed by Melody Rosa DO at [address] [facility] N/A 8/13/2018     EGD BIOPSY performed by Melody Rosa DO at [address] [facility]   08/16/2018     egd with balloon dilitation    UPPER GASTROINTESTINAL ENDOSCOPY   8/16/2018     EGD DILATION BALLOON performed by Matthew Graves MD at [address] [facility] 10/1/2018     EGD BIOPSY performed by Tramaine Roberts MD at [address] [place] [address]   10/1/2018     EGD DILATION BALLOON performed by Tramaine Roberts MD at [address] [place] [address] N/A 11/19/2018     EGD DILATION BALLOON performed by Tramaine Roberts MD at [address] [place] [address] N/A 10/15/2020     EGD SUBMUCOSAL/BOTOX INJECTION tattoo  performed by Marisa Bergeron MD at [address]:    Current Medication      Current Outpatient Medications:     bumetanide (BUMEX) 1 MG tablet, Take 2 mg of Bumex in morning, 1 mg of Bumex in afternoon, take 1 mg in evening if weight is >3 above baseline weight, Disp: 120 tablet, Rfl: 0    metoprolol succinate (TOPROL XL) 25 MG extended release tablet, TAKE ONE TABLET BY MOUTH TWICE DAILY, Disp: 56 tablet, Rfl: 2    tamsulosin (FLOMAX) 0.4 MG capsule, Take 1 capsule by mouth daily, Disp: 30 capsule, Rfl: 1    pantoprazole (PROTONIX) 40 MG tablet, TAKE 1 TABLET BY MOUTH DAILY, Disp: 28 tablet, Rfl: 3    XARELTO 20 MG TABS tablet, TAKE 1 TABLET BY MOUTH DAILY, Disp: 28 tablet, Rfl: 3    CREON [id number] units delayed release capsule, TAKE 1 CAPSULE BY MOUTH THREE TIMES DAILY WITH MEALS, Disp: 84 capsule, Rfl: 3    rOPINIRole (REQUIP) 0.25 MG tablet, TAKE 1 TABLET BY MOUTH TWICE DAILY, Disp: 56 tablet, Rfl: 3    gabapentin (NEURONTIN) 300 MG capsule, TAKE 1 CAPSULE BY MOUTH THREE TIMES DAILY, Disp: 84 capsule, Rfl: 2    MUCUS RELIEF 600 MG extended release tablet, TAKE 1 TABLET BY MOUTH TWICE DAILY, Disp: 60 tablet, Rfl: 3    tiZANidine (ZANAFLEX) 4 MG tablet, TAKE 1 TABLET BY MOUTH DAILY AS NEEDED, Disp: 28 tablet, Rfl: 3    carboxymethylcellulose (REFRESH PLUS) 0.5 % SOLN ophthalmic solution, Apply 1 drop to eye 4 times daily, Disp: , Rfl:     Menthol-Methyl Salicylate (THERA-GESIC) 0.5-15 % CREA, Apply topically 2 times daily as needed, Disp: , Rfl:     midodrine (PROAMATINE) 5 MG tablet, Take 1 tablet three times daily if SBP <120, Disp: 90 tablet, Rfl: 1    potassium chloride (KLOR-CON M) 20 MEQ extended release tablet, Take 1 tablet by mouth 2 times daily, Disp: 56 tablet, Rfl: 5    Multiple Vitamin (MULTI-VITAMINS) TABS, Take 1 tablet by mouth daily, Disp: 28 tablet, Rfl: 5    cetirizine (ZYRTEC) 10 MG tablet, TAKE 1 TABLET BY MOUTH DAILY, Disp: 28 tablet, Rfl: 5    fluticasone (FLONASE) 50 MCG/ACT nasal spray, 2 sprays by Nasal route daily, Disp: 16 g, Rfl: 3    ascorbic acid (VITAMIN C) 500 MG tablet, Take 1 tablet by mouth daily, Disp: 30 tablet, Rfl: 2    ferrous sulfate (FE TABS 325) 325 (65 Fe) MG EC tablet, Take 1 tablet by mouth 2 times daily (with meals), Disp: 90 tablet, Rfl: 3    vitamin B-12 (CYANOCOBALAMIN) 100 MCG tablet, TAKE 1 TABLET BY MOUTH DAILY AS NEEDED FOR FATIGUE, Disp: 30 tablet, Rfl: 5    D-Mannose 500 MG CAPS, Take 500 mg by mouth 3 times daily, Disp: 90 capsule, Rfl: 11    SM LUBRICANT EYE DROPS 0.4-0.3 % ophthalmic solution, PLACE 1 DROP INTO BOTH EYES FOUR TIMES DAILY AS NEEDED FOR DRY EYES, Disp: 15 mL, Rfl: 3    Fluticasone furoate-vilanterol (BREO ELLIPTA) 200-25 MCG/INH AEPB inhaler, Inhale 1 puff into the lungs daily, Disp: , Rfl:     Handicap Placard MISC, by Does not apply route, Disp: 1 each, Rfl: 0    Incontinence Supply Disposable (INCONTINENCE BRIEF LARGE) MISC, To use as directed. Use 3x a day, Disp: 90 each, Rfl: 3    polyethylene glycol (GLYCOLAX) powder, Take 17 g by mouth daily, Disp: 527 g, Rfl: 3    Foot Care Products (TRI-BALANCE ORTHOTICS MENS) MISC, Provide insurance covered orthotics shoes, wear daily, Disp: 2 each, Rfl: 0    albuterol (PROVENTIL) 2 MG tablet, Use as needed, Disp: 1 tablet, Rfl: 0    ipratropium-albuterol (DUONEB) 0.5-2.5 (3) MG/3ML SOLN nebulizer solution, Inhale 1 vial into the lungs every 4 hours as needed , Disp: , Rfl:     Armodafinil 200 MG TABS, Take 1 tablet by mouth 2 times daily.  , Disp: , Rfl:         ALLERGIES:         Allergies   Allergen Reactions    Darvocet [Propoxyphene N-Acetaminophen] Hives    Other Hives    Oxycodone-Acetaminophen      Propoxyphene         Other reaction(s): Unknown         FAMILY HISTORY:   Family History             Problem Relation Age of Onset    Cancer Mother      Heart Attack Father                 SOCIAL HISTORY:   Social History               Socioeconomic History    Marital status:        Spouse name: Not on file    Number of children: Not on file    Years of education: Not on file    Highest education level: Not on file   Occupational History    Not on file   Social Needs    Financial resource strain: Not on file    Food insecurity       Worry: Not on file       Inability: Not on file    Transportation needs       Medical: Not on file       Non-medical: Not on file   Tobacco Use    Smoking status: Former Smoker       Packs/day: 1.00       Years: 20.00       Pack years: 20.00       Quit date: 1976       Years since quittin.2    Smokeless tobacco: Never Used   Substance and Sexual Activity    Alcohol use: No    Drug use: No    Sexual activity: Not on file Lifestyle    Physical activity       Days per week: Not on file       Minutes per session: Not on file    Stress: Not on file   Relationships    Social connections       Talks on phone: Not on file       Gets together: Not on file       Attends Confucianist service: Not on file       Active member of club or organization: Not on file       Attends meetings of clubs or organizations: Not on file       Relationship status: Not on file    Intimate partner violence       Fear of current or ex partner: Not on file       Emotionally abused: Not on file       Physically abused: Not on file       Forced sexual activity: Not on file   Other Topics Concern    Not on file   Social History Narrative    Not on file            REVIEW OF SYSTEMS: A 12-point review of systemswas obtained and pertinent positives and negatives were enumerated above in the history of present illness. All other reviewed systems / symptoms were negative.      Review of Systems           LABORATORY DATA: Reviewed        Lab Results   Component Value Date     WBC 4.8 11/02/2020     HGB 10.1 (L) 11/02/2020     HCT 33.5 (L) 11/02/2020     .0 11/02/2020      11/02/2020      11/12/2020     K 3.9 11/12/2020      11/12/2020     CO2 28 11/12/2020     BUN 13 11/12/2020     CREATININE 0.74 02/05/2021     LABALBU 4.0 10/11/2020     BILITOT 1.12 10/11/2020     ALKPHOS 126 10/11/2020     AST 19 10/11/2020     ALT 9 10/11/2020     INR 1.3 10/11/2020            Lab Results   Component Value Date     RBC 3.35 (L) 11/02/2020     HGB 10.1 (L) 11/02/2020     .0 11/02/2020     MCH 30.1 11/02/2020     MCHC 30.1 11/02/2020     RDW 14.8 (H) 11/02/2020     MPV 8.7 11/02/2020     BASOPCT 1 10/14/2020     LYMPHSABS 0.73 (L) 10/14/2020     MONOSABS 0.70 10/14/2020     NEUTROABS 3.17 10/14/2020     EOSABS 0.31 10/14/2020     BASOSABS 0.03 10/14/2020            DIAGNOSTIC TESTING:      Xr Chest (2 Vw)     Result Date: 2/26/2021  EXAMINATION: TWO XRAY VIEWS OF THE CHEST 2/26/2021 12:29 pm COMPARISON: 10/23/2020 HISTORY: ORDERING SYSTEM PROVIDED HISTORY: Chronic obstructive pulmonary disease, unspecified COPD type ([address] TECHNOLOGIST PROVIDED HISTORY: Reason for Exam: cough FINDINGS: Mild cardiomegaly was noted without pneumonia, interstitial edema or pleural effusion. Unchanged elevation of the right hemidiaphragm was noted. 1 metallic clip was noted in the left shoulder secondary to prior left rotator cuff repair. Mild cardiomegaly without pneumonia, interstitial edema or pleural effusion. Unchanged elevation of the right hemidiaphragm. No significant change from 10/23/2020. PHYSICAL EXAMINATION: Vital signs reviewed per the nursing documentation. There were no vitals taken for this visit. There is no height or weight on file to calculate BMI. Physical Exam        I personally reviewed the nurse's notes and documentation and I agree with her notes. General: alert, appears stated age and cooperative Psych: Normal. and Alert and oriented, appropriate affect. . Normal affect. Mentation normal  HEENT: PERRLA. Clear conjunctivae and sclerae. Moist oral mucosae, no lesions or ulcers. The neck is supple, without lymphadenopathy or jugular venous distension. No masses. Normal thyroid. Cardiovascular: S1 S2 RRR no rubs or murmurs. Pulmonary: clear BL. No accessory muscle usage. Abdominal Exam: Soft, NT ND, no hepato or spleno megaly, +BS, no ascites. IMPRESSION: Mr. Aga Juarez is a 77 y.o. male with rectal bleeding. Plan for colonoscopy. Delayed gastric emptying. He is doing much better on low residue diet. Thank you for allowing me to participate in the care of Mr. Aga Juarez. For any further questions please do not hesitate to contact me. I have reviewed and agree with the ROS entered by the MA/LPN. Note is dictated utilizing voice recognition software.  Unfortunately this leads to occasional typographical errors. Please contact our office if you have any questions.      Chris Johnson MD  [facility]  O: #[phone]                  Revision History

## 2021-04-05 NOTE — ANESTHESIA PRE PROCEDURE
Department of Anesthesiology  Preprocedure Note       Name:  Meenu Pena   Age:  77 y.o.  :  1945                                          MRN:  [id number]         Date:  2021      Surgeon: Marilin Smith):  Aki Nichole MD    Procedure: Procedure(s):  COLONOSCOPY DIAGNOSTIC    Medications prior to admission:   Prior to Admission medications    Medication Sig Start Date End Date Taking? Authorizing Provider   gabapentin (NEURONTIN) 300 MG capsule TAKE 1 CAPSULE BY MOUTH THREE TIMES DAILY 3/31/21 6/29/21 Chucho Giraldo PA-C   vitamin B-12 (CYANOCOBALAMIN) 100 MCG tablet TAKE 1 TABLET BY MOUTH DAILY AS NEEDED FOR FATIGUE 3/27/21  Chucho Giraldo PA-C   tamsulosin [facility]) 0.4 MG capsule Take 1 capsule by mouth daily 3/12/21  Chucho Giraldo PA-C   bisacodyl (DULCOLAX) 5 MG EC tablet TAKE 4 TABS AS DIRECTED BY PHYSICIAN OFFICE 3/10/21  Yes Aki Nichole MD   polyethylene glycol (GLYCOLAX) 17 GM/SCOOP powder Use as directed by following your patient instructions given by office.  3/10/21  Yes Aki Nichole MD   bumetanide (BUMEX) 1 MG tablet Take 2 mg of Bumex in morning, 1 mg of Bumex in afternoon, take 1 mg in evening if weight is >3 above baseline weight 3/6/21  Chucho Giraldo PA-C   metoprolol succinate (TOPROL XL) 25 MG extended release tablet TAKE ONE TABLET BY MOUTH TWICE DAILY 3/3/21  Chucho Giraldo PA-C   pantoprazole (PROTONIX) 40 MG tablet TAKE 1 TABLET BY MOUTH DAILY 2/3/21  Chucho Giraldo PA-C   CREON 98509-11970 units delayed release capsule TAKE 1 CAPSULE BY MOUTH THREE TIMES DAILY WITH MEALS 2/3/21  Chucho Giraldo PA-C   rOPINIRole (REQUIP) 0.25 MG tablet TAKE 1 TABLET BY MOUTH TWICE DAILY 2/3/21  Chucho Giraldo PA-C   tiZANidine (ZANAFLEX) 4 MG tablet TAKE 1 TABLET BY MOUTH DAILY AS NEEDED 21  Chucho Giraldo PA-C   carboxymethylcellulose (REFRESH PLUS) 0.5 % SOLN ophthalmic solution Apply 1 drop to eye 4 times daily Yes Historical Provider, MD   potassium chloride (KLOR-CON M) 20 MEQ extended release tablet Take 1 tablet by mouth 2 times daily 12/3/20  Yes Wesley Giraldo PA-C   Multiple Vitamin (MULTI-VITAMINS) TABS Take 1 tablet by mouth daily 12/3/20  Yes Wesley Giraldo PA-C   cetirizine (ZYRTEC) 10 MG tablet TAKE 1 TABLET BY MOUTH DAILY 11/12/20  Chucho Giraldo PA-C   fluticasone [employer]) 50 MCG/ACT nasal spray 2 sprays by Nasal route daily 11/5/20  Yes Wesley Giraldo PA-C   ascorbic acid (VITAMIN C) 500 MG tablet Take 1 tablet by mouth daily 10/26/20  Yes Wesley Giraldo PA-C   ferrous sulfate (FE TABS 325) 325 (65 Fe) MG EC tablet Take 1 tablet by mouth 2 times daily (with meals) 10/16/20  Yes Marilin Sultana PA-C   SM LUBRICANT EYE DROPS 0.4-0.3 % ophthalmic solution PLACE 1 DROP INTO BOTH EYES FOUR TIMES DAILY AS NEEDED FOR DRY EYES 10/17/19  Yes Wesley Giraldo PA-C   Fluticasone furoate-vilanterol (BREO ELLIPTA) 200-25 MCG/INH AEPB inhaler Inhale 1 puff into the lungs daily   Yes Historical Provider, MD   ipratropium-albuterol (DUONEB) 0.5-2.5 (3) MG/3ML SOLN nebulizer solution Inhale 1 vial into the lungs every 4 hours as needed    Yes Historical Provider, MD   benzonatate (TESSALON) 100 MG capsule TAKE 1 CAPSULE BY MOUTH THREE TIMES DAILY AS NEEDED FOR COUGH 3/31/21   Wesley Giraldo PA-C   polyethylene glycol [facility]) 17 GM/SCOOP powder Use as directed by following your patient instructions given by office.  3/29/21   Aki Nichole MD   bisacodyl (DULCOLAX) 5 MG EC tablet TAKE 4 TABS AS DIRECTED BY PHYSICIAN OFFICE 3/29/21   Aki Nichole MD   magnesium citrate solution Take 296 mLs by mouth once for 1 dose 3/29/21 3/29/21  Aki Nichole MD   magnesium citrate solution Take 296 mLs by mouth once for 1 dose 3/29/21 3/29/21  Aki Nichole MD   XARELTO 20 MG TABS tablet TAKE 1 TABLET BY MOUTH DAILY 2/3/21   Wesley Giraldo PA-C   Menthol-Methyl Salicylate ([facility])  Class 2 severe obesity due to excess calories with serious comorbidity and body mass index (BMI) of 36.0 to 36.9 in adult ([facility]) E66.01, Z68.36    Simple chronic bronchitis ([facility]) J41.0    Hospital-acquired bacterial pneumonia J15.9    Fluid overload E87.70    COPD (chronic obstructive pulmonary disease) ([facility]) J44.9    Chronic diastolic heart failure ([facility]) I50.32    Status post gastroplasty Z98.890    Benign prostatic hyperplasia with urinary hesitancy N40.1, R39.11    Myalgia M79.10       Past Medical History:        Diagnosis Date    Acute superficial gastritis without hemorrhage 5/26/2018    Anastomotic stricture of stomach     Asthma     Atrial fibrillation ([facility])     On Xarelto 6/27/2020    Calculus of gallbladder without cholecystitis without obstruction 5/2/2017    Chronic diastolic heart failure ([facility]) 11/17/2017    Chronic rhinosinusitis 4/13/2015    Class 2 severe obesity due to excess calories with serious comorbidity and body mass index (BMI) of 36.0 to 36.9 in adult [facility]) 11/17/2017    COPD (chronic obstructive pulmonary disease) ([facility])     E. coli septicemia ([facility])     E. 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 coli ([facility])     Due to UTI     Simple chronic bronchitis ([facility]) 4/10/2018    Slow transit constipation 11/3/2016    Unspecified sleep apnea     UTI (urinary tract infection)        Past Surgical History:        Procedure Laterality Date    CARPAL TUNNEL RELEASE      bilateral    COLONOSCOPY      CYSTOSCOPY  01/02/2019    by Dr. Anais Simmons N/A 1/2/2019    CYSTOSCOPY performed by Abbey Munroe MD at [address]    first knuckle right index finger   [address]    vertical banded gastroplasty   [address] REPLACEMENT  2004 & 2005    bilateral knees    MS EGD [address] N/A 8/16/2018    EGD FOREIGN BODY REMOVAL performed by Mookie Carpio MD at [address] EGD TRANSORAL BIOPSY SINGLE/MULTIPLE N/A 5/25/2018    EGD BIOPSY performed by Osiel Cosme DO at [address] [facility]      left shoulder    UPPER GASTROINTESTINAL ENDOSCOPY  08/13/2018    removal of food bolus    UPPER GASTROINTESTINAL ENDOSCOPY N/A 8/13/2018    EGD FOREIGN BODY REMOVAL performed by Osiel Cosme DO at [facility] 8/13/2018    EGD BIOPSY performed by Osiel Cosme DO at [facility]  08/16/2018    egd with balloon dilitation    UPPER GASTROINTESTINAL ENDOSCOPY  8/16/2018    EGD DILATION BALLOON performed by Mookie Carpio MD at [facility] 10/1/2018    EGD BIOPSY performed by Maribel Flower MD at [address] [place] [address]  10/1/2018    EGD DILATION BALLOON performed by Maribel Flower MD at [address] [place] [address] N/A 11/19/2018    EGD DILATION BALLOON performed by Maribel Flower MD at [address] [place] [address] N/A 10/15/2020    EGD SUBMUCOSAL/BOTOX INJECTION tattoo  performed by Felicitas Michelle MD at [facility]       Social History:    Social History     Tobacco Use    Smoking status: Former Smoker     Packs/day: 1.00     Years: 20.00     Pack years: 20.00 HCGQUANT     ABGs: No results found for: PHART, PO2ART, XOS9SIW, OEE5CFZ, BEART, N4GJXNQW     Type & Screen (If Applicable):  No results found for: LABABO, LABRH    Drug/Infectious Status (If Applicable):  Lab Results   Component Value Date    HEPCAB NONREACTIVE 04/10/2017       COVID-19 Screening (If Applicable):   Lab Results   Component Value Date    COVID19 Not Detected 04/01/2021           Anesthesia Evaluation  Patient summary reviewed  Airway: Mallampati: I  TM distance: >3 FB   Neck ROM: full  Mouth opening: > = 3 FB Dental:    (+) edentulous      Pulmonary:   (+) pneumonia:  COPD:  sleep apnea: on CPAP,  asthma:                            Cardiovascular:    (+) hypertension:,                   Neuro/Psych:                ROS comment: Restless leg syndrome GI/Hepatic/Renal:   (+) GERD:,          ROS comment: Gastric bypass (1985); lost 200 lbs. Endo/Other: Negative Endo/Other ROS                    Abdominal:           Vascular:                                        Anesthesia Plan      MAC     ASA 3       Induction: intravenous. Anesthetic plan and risks discussed with patient. Plan discussed with CRNA.                   Leah Penn MD   4/5/2021

## 2021-04-05 NOTE — ANESTHESIA POSTPROCEDURE EVALUATION
Department of Anesthesiology  Postprocedure Note    Patient: Colon Ok  MRN: [id number]  YOB: 1945  Date of evaluation: 4/5/2021  Time:  11:27 AM     Procedure Summary     Date: 04/05/21 Room / Location: [place] [address] / [facility] - INPATIENT    Anesthesia Start: 1010 Anesthesia Stop: 1039    Procedure: COLONOSCOPY DIAGNOSTIC (N/A ) Diagnosis: (DX RECTAL BLEEDING)    Surgeons: Lexi Jarrett MD Responsible Provider: Sy Stoddard MD    Anesthesia Type: MAC ASA Status: 3          Anesthesia Type: MAC    Mark Phase I:      Mark Phase II:      Last vitals: Reviewed and per EMR flowsheets.        Anesthesia Post Evaluation    Patient location during evaluation: PACU  Patient participation: complete - patient participated  Level of consciousness: awake  Airway patency: patent  Nausea & Vomiting: no nausea  Complications: no  Cardiovascular status: blood pressure returned to baseline  Respiratory status: acceptable  Hydration status: euvolemic

## 2021-04-07 ENCOUNTER — HOSPITAL ENCOUNTER (INPATIENT)
Age: 76
LOS: 3 days | Discharge: HOME | DRG: 243 | End: 2021-04-10
Attending: EMERGENCY MEDICINE | Admitting: INTERNAL MEDICINE
Payer: MEDICARE

## 2021-04-07 ENCOUNTER — APPOINTMENT (OUTPATIENT)
Dept: CT IMAGING | Age: 76
DRG: 243 | End: 2021-04-07
Payer: MEDICARE

## 2021-04-07 ENCOUNTER — APPOINTMENT (OUTPATIENT)
Dept: GENERAL RADIOLOGY | Age: 76
DRG: 243 | End: 2021-04-07
Payer: MEDICARE

## 2021-04-07 DIAGNOSIS — I50.32 CHRONIC DIASTOLIC HEART FAILURE (HCC): ICD-10-CM

## 2021-04-07 DIAGNOSIS — I48.91 ATRIAL FIBRILLATION WITH SLOW VENTRICULAR RESPONSE (HCC): Primary | ICD-10-CM

## 2021-04-07 DIAGNOSIS — R55 SYNCOPE AND COLLAPSE: ICD-10-CM

## 2021-04-07 LAB
ABSOLUTE EOS #: 0.2 K/UL (ref 0–0.44)
ABSOLUTE IMMATURE GRANULOCYTE: 0.03 K/UL (ref 0–0.3)
ABSOLUTE LYMPH #: 0.72 K/UL (ref 1.1–3.7)
ABSOLUTE MONO #: 0.47 K/UL (ref 0.1–1.2)
ANION GAP SERPL CALCULATED.3IONS-SCNC: 14 MMOL/L (ref 9–17)
BASOPHILS # BLD: 1 % (ref 0–2)
BASOPHILS ABSOLUTE: 0.03 K/UL (ref 0–0.2)
BNP INTERPRETATION: ABNORMAL
BUN BLDV-MCNC: 29 MG/DL (ref 8–23)
BUN/CREAT BLD: 18 (ref 9–20)
CALCIUM SERPL-MCNC: 8.7 MG/DL (ref 8.6–10.4)
CHLORIDE BLD-SCNC: 100 MMOL/L (ref 98–107)
CO2: 21 MMOL/L (ref 20–31)
CREAT SERPL-MCNC: 1.58 MG/DL (ref 0.7–1.2)
DIFFERENTIAL TYPE: ABNORMAL
EOSINOPHILS RELATIVE PERCENT: 5 % (ref 1–4)
GFR AFRICAN AMERICAN: 52 ML/MIN
GFR NON-AFRICAN AMERICAN: 43 ML/MIN
GFR SERPL CREATININE-BSD FRML MDRD: ABNORMAL ML/MIN/{1.73_M2}
GFR SERPL CREATININE-BSD FRML MDRD: ABNORMAL ML/MIN/{1.73_M2}
GLUCOSE BLD-MCNC: 88 MG/DL (ref 70–99)
HCT VFR BLD CALC: 32.8 % (ref 40.7–50.3)
HEMOGLOBIN: 10.1 G/DL (ref 13–17)
IMMATURE GRANULOCYTES: 1 %
LACTIC ACID, SEPSIS WHOLE BLOOD: NORMAL MMOL/L (ref 0.5–1.9)
LACTIC ACID, SEPSIS: 1.7 MMOL/L (ref 0.5–1.9)
LYMPHOCYTES # BLD: 19 % (ref 24–43)
MAGNESIUM: 2.2 MG/DL (ref 1.6–2.6)
MCH RBC QN AUTO: 32.3 PG (ref 25.2–33.5)
MCHC RBC AUTO-ENTMCNC: 30.8 G/DL (ref 28.4–34.8)
MCV RBC AUTO: 104.8 FL (ref 82.6–102.9)
MONOCYTES # BLD: 13 % (ref 3–12)
MYOGLOBIN: 115 NG/ML (ref 28–72)
NRBC AUTOMATED: 0 PER 100 WBC
PDW BLD-RTO: 14.4 % (ref 11.8–14.4)
PLATELET # BLD: 116 K/UL (ref 138–453)
PLATELET ESTIMATE: ABNORMAL
PMV BLD AUTO: 9.3 FL (ref 8.1–13.5)
POTASSIUM SERPL-SCNC: 3.8 MMOL/L (ref 3.7–5.3)
PRO-BNP: 8320 PG/ML
RBC # BLD: 3.13 M/UL (ref 4.21–5.77)
RBC # BLD: ABNORMAL 10*6/UL
SEG NEUTROPHILS: 61 % (ref 36–65)
SEGMENTED NEUTROPHILS ABSOLUTE COUNT: 2.3 K/UL (ref 1.5–8.1)
SODIUM BLD-SCNC: 135 MMOL/L (ref 135–144)
TROPONIN INTERP: ABNORMAL
TROPONIN T: ABNORMAL NG/ML
TROPONIN, HIGH SENSITIVITY: 64 NG/L (ref 0–22)
TSH SERPL DL<=0.05 MIU/L-ACNC: 1.77 MIU/L (ref 0.3–5)
WBC # BLD: 3.8 K/UL (ref 3.5–11.3)
WBC # BLD: ABNORMAL 10*3/UL

## 2021-04-07 PROCEDURE — 83880 ASSAY OF NATRIURETIC PEPTIDE: CPT

## 2021-04-07 PROCEDURE — 99284 EMERGENCY DEPT VISIT MOD MDM: CPT

## 2021-04-07 PROCEDURE — 70450 CT HEAD/BRAIN W/O DYE: CPT

## 2021-04-07 PROCEDURE — 80048 BASIC METABOLIC PNL TOTAL CA: CPT

## 2021-04-07 PROCEDURE — 71045 X-RAY EXAM CHEST 1 VIEW: CPT

## 2021-04-07 PROCEDURE — 84443 ASSAY THYROID STIM HORMONE: CPT

## 2021-04-07 PROCEDURE — 83605 ASSAY OF LACTIC ACID: CPT

## 2021-04-07 PROCEDURE — 83735 ASSAY OF MAGNESIUM: CPT

## 2021-04-07 PROCEDURE — 85025 COMPLETE CBC W/AUTO DIFF WBC: CPT

## 2021-04-07 PROCEDURE — 84484 ASSAY OF TROPONIN QUANT: CPT

## 2021-04-07 PROCEDURE — 2060000000 HC ICU INTERMEDIATE R&B

## 2021-04-07 PROCEDURE — 93005 ELECTROCARDIOGRAM TRACING: CPT | Performed by: NURSE PRACTITIONER

## 2021-04-07 PROCEDURE — 83874 ASSAY OF MYOGLOBIN: CPT

## 2021-04-07 RX ORDER — SODIUM CHLORIDE 0.9 % (FLUSH) 0.9 %
5-40 SYRINGE (ML) INJECTION EVERY 12 HOURS SCHEDULED
Status: DISCONTINUED | OUTPATIENT
Start: 2021-04-07 | End: 2021-04-09 | Stop reason: SDUPTHER

## 2021-04-07 RX ORDER — SODIUM CHLORIDE 9 MG/ML
25 INJECTION, SOLUTION INTRAVENOUS PRN
Status: DISCONTINUED | OUTPATIENT
Start: 2021-04-07 | End: 2021-04-09 | Stop reason: SDUPTHER

## 2021-04-07 RX ORDER — SODIUM CHLORIDE 0.9 % (FLUSH) 0.9 %
5-40 SYRINGE (ML) INJECTION PRN
Status: DISCONTINUED | OUTPATIENT
Start: 2021-04-07 | End: 2021-04-09 | Stop reason: SDUPTHER

## 2021-04-07 RX ORDER — ACETAMINOPHEN 325 MG/1
650 TABLET ORAL EVERY 4 HOURS PRN
Status: DISCONTINUED | OUTPATIENT
Start: 2021-04-07 | End: 2021-04-10 | Stop reason: HOSPADM

## 2021-04-07 RX ORDER — 0.9 % SODIUM CHLORIDE 0.9 %
250 INTRAVENOUS SOLUTION INTRAVENOUS ONCE
Status: DISCONTINUED | OUTPATIENT
Start: 2021-04-07 | End: 2021-04-10 | Stop reason: HOSPADM

## 2021-04-07 ASSESSMENT — ENCOUNTER SYMPTOMS
VOMITING: 0
SINUS PRESSURE: 0
SORE THROAT: 0
ABDOMINAL PAIN: 0
SHORTNESS OF BREATH: 0
WHEEZING: 0
CONSTIPATION: 0
COLOR CHANGE: 0
COUGH: 0
DIARRHEA: 0
NAUSEA: 0
RHINORRHEA: 0

## 2021-04-07 ASSESSMENT — PAIN SCALES - GENERAL: PAINLEVEL_OUTOF10: 0

## 2021-04-07 NOTE — ED PROVIDER NOTES
[address] [place] [address] ED  EMERGENCY DEPARTMENT ENCOUNTER   ATTENDING ATTESTATION     Pt Name: Jsoe Ramon Mcneal  MRN: [id number]  [place] 1945  Date of evaluation: 4/7/21       Jose Ramon Mcneal is a 77 y.o. male who presents with Loss of Consciousness, Hypotension, and Dizziness      MDM:     This is a 78-year-old male that presents with complaints of a syncopal episode. The patient has a history of atrial fibrillation, he also has a history of bradycardia and hypotension. The patient presents today after syncopal episode. He is noted to be in slow A. fib, we discussed the case with his cardiologist, he recommended against giving any medications less he is symptomatic at rest.  Right now he states he is not dizzy or having any issues. Plan is admission to the cardiology service for reevaluation. The patient's hypotension is chronic and not related to sepsis but related to his heart rate. Vitals:   Vitals:    04/07/21 1728 04/07/21 1747   BP: (!) 86/39 (!) 90/46   Pulse: 148 (!) 43   Resp: 14 18   Temp: 98.6 °F (37 °C) 97.5 °F (36.4 °C)   TempSrc:  Oral   SpO2: 100% 100%   Weight: 217 lb (98.4 kg) 217 lb (98.4 kg)   Height:  5' 10\" (1.778 m)         I personally evaluated and examined the patient in conjunction with the Midlevel provider and agree with the assessment, treatment plan, and disposition of the patient as recorded by the midlevel. I performed a history and physical examination of the patient and discussed management with the midlevel. I reviewed the midlevels note and agree with the documented findings and plan of care. Any areas of disagreement are noted on the chart. I was personally present for the key portions of any procedures. I have documented in the chart those procedures where I was not present during the key portions. I have personally reviewed all images and agree with the midlevel's interpretation. I have reviewed the emergency nurses triage note.  I agree with the chief complaint, past

## 2021-04-07 NOTE — ED PROVIDER NOTES
[address] [place] [address] ED  eMERGENCY dEPARTMENT eNCOUnter      Pt Name: Cathleen Stewart  MRN: [id number]  Ronnygfchiara 1945  Date of evaluation: 4/7/2021  Provider: Christel Wilkerson NP, JULIAN Raza 1222       Chief Complaint   Patient presents with    Loss of Consciousness    Hypotension    Dizziness         HISTORY OF PRESENT ILLNESS  (Location/Symptom, Timing/Onset, Context/Setting, Quality, Duration, Modifying Factors, Severity.)   Cathleen Stewart is a 77 y.o. male who presents to the emergency department by private vehicle for evaluation of a syncopal episode. Patient has a history of atrial fibrillation. He also has a history of low blood pressure. The wife states that no matter what they do they cannot get his blood pressure to REMAIN normal.  Today he was sitting in his chair when he had a syncopal episode. His daughter states that he was out for roughly 2 to 3 minutes. There was no seizure activity. The patient denies any symptoms currently. He states he has had these intermittent episodes of headaches and seeing pink and purple spots for the last few days. Wife states that they were in contact with Dr. Kuldip Arciniega who told him to come to the emergency room for evaluation. Nursing Notes were reviewed. ALLERGIES     Darvocet [propoxyphene n-acetaminophen], Other, Oxycodone-acetaminophen, and Propoxyphene    CURRENT MEDICATIONS       Previous Medications    ALBUTEROL (PROVENTIL) 2 MG TABLET    Use as needed    ARMODAFINIL 200 MG TABS    Take 1 tablet by mouth 2 times daily.      ASCORBIC ACID (VITAMIN C) 500 MG TABLET    Take 1 tablet by mouth daily    BENZONATATE (TESSALON) 100 MG CAPSULE    TAKE 1 CAPSULE BY MOUTH THREE TIMES DAILY AS NEEDED FOR COUGH    BISACODYL (DULCOLAX) 5 MG EC TABLET    TAKE 4 TABS AS DIRECTED BY PHYSICIAN OFFICE    BISACODYL (DULCOLAX) 5 MG EC TABLET    TAKE 4 TABS AS DIRECTED BY PHYSICIAN OFFICE    BUMETANIDE (BUMEX) 1 MG TABLET    Take 2 mg of Bumex in morning, 1 mg of Bumex in afternoon, take 1 mg in evening if weight is >3 above baseline weight    CARBOXYMETHYLCELLULOSE (REFRESH PLUS) 0.5 % SOLN OPHTHALMIC SOLUTION    Apply 1 drop to eye 4 times daily    CETIRIZINE (ZYRTEC) 10 MG TABLET    TAKE 1 TABLET BY MOUTH DAILY    CREON 25932-78588 UNITS DELAYED RELEASE CAPSULE    TAKE 1 CAPSULE BY MOUTH THREE TIMES DAILY WITH MEALS    FERROUS SULFATE (FE TABS 325) 325 (65 FE) MG EC TABLET    Take 1 tablet by mouth 2 times daily (with meals)    FLUTICASONE (FLONASE) 50 MCG/ACT NASAL SPRAY    2 sprays by Nasal route daily    FLUTICASONE FUROATE-VILANTEROL (BREO ELLIPTA) 200-25 MCG/INH AEPB INHALER    Inhale 1 puff into the lungs daily    FOOT CARE PRODUCTS (TRI-BALANCE ORTHOTICS MENS) MISC    Provide insurance covered orthotics shoes, wear daily    GABAPENTIN (NEURONTIN) 300 MG CAPSULE    TAKE 1 CAPSULE BY MOUTH THREE TIMES DAILY    HANDICAP PLACARD MISC    by Does not apply route    INCONTINENCE SUPPLY DISPOSABLE (INCONTINENCE BRIEF LARGE) MISC    To use as directed. Use 3x a day    IPRATROPIUM-ALBUTEROL (DUONEB) 0.5-2.5 (3) MG/3ML SOLN NEBULIZER SOLUTION    Inhale 1 vial into the lungs every 4 hours as needed     MENTHOL-METHYL SALICYLATE (THERA-GESIC) 0.5-15 % CREA    Apply topically 2 times daily as needed    METOPROLOL SUCCINATE (TOPROL XL) 25 MG EXTENDED RELEASE TABLET    TAKE ONE TABLET BY MOUTH TWICE DAILY    MULTIPLE VITAMIN (MULTI-VITAMINS) TABS    Take 1 tablet by mouth daily    PANTOPRAZOLE (PROTONIX) 40 MG TABLET    TAKE 1 TABLET BY MOUTH DAILY    POLYETHYLENE GLYCOL (GLYCOLAX) 17 GM/SCOOP POWDER    Use as directed by following your patient instructions given by office. POLYETHYLENE GLYCOL (GLYCOLAX) 17 GM/SCOOP POWDER    Use as directed by following your patient instructions given by office.     POTASSIUM CHLORIDE (KLOR-CON M) 20 MEQ EXTENDED RELEASE TABLET    Take 1 tablet by mouth 2 times daily    ROPINIROLE (REQUIP) 0.25 MG TABLET    TAKE 1 TABLET BY MOUTH TWICE DAILY    SM LUBRICANT EYE DROPS 0.4-0.3 % OPHTHALMIC SOLUTION    PLACE 1 DROP INTO BOTH EYES FOUR TIMES DAILY AS NEEDED FOR DRY EYES    TAMSULOSIN (FLOMAX) 0.4 MG CAPSULE    Take 1 capsule by mouth daily    TIZANIDINE (ZANAFLEX) 4 MG TABLET    TAKE 1 TABLET BY MOUTH DAILY AS NEEDED    VITAMIN B-12 (CYANOCOBALAMIN) 100 MCG TABLET    TAKE 1 TABLET BY MOUTH DAILY AS NEEDED FOR FATIGUE    XARELTO 20 MG TABS TABLET    TAKE 1 TABLET BY MOUTH DAILY       PAST MEDICAL HISTORY         Diagnosis Date    Acute superficial gastritis without hemorrhage 5/26/2018    Anastomotic stricture of stomach     Asthma     Atrial fibrillation (HCC)     On Xarelto 6/27/2020    Calculus of gallbladder without cholecystitis without obstruction 5/2/2017    Chronic diastolic heart failure (HCC) 11/17/2017    Chronic rhinosinusitis 4/13/2015    Class 2 severe obesity due to excess calories with serious comorbidity and body mass index (BMI) of 36.0 to 36.9 in [facility]) 11/17/2017    COPD (chronic obstructive pulmonary disease) ([facility])     E. coli septicemia ([facility])     E. 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 coli ([address]     Due to UTI     Simple chronic bronchitis ([address] 4/10/2018    Slow transit constipation 11/3/2016    Unspecified sleep apnea     UTI (urinary tract infection)        SURGICAL HISTORY Procedure Laterality Date    CARPAL TUNNEL RELEASE      bilateral    COLONOSCOPY      COLONOSCOPY N/A 4/5/2021    COLONOSCOPY DIAGNOSTIC performed by Kat Duff MD at [address] [facility]  01/02/2019    by Dr. Catarino Epperson N/A 1/2/2019    CYSTOSCOPY performed by Sophie Voss MD at [address]    first knuckle right index finger   [facility]    vertical banded gastroplasty   [address] REPLACEMENT  2004 & 2005    bilateral knees    [place] EGD [address] N/A 8/16/2018    EGD FOREIGN BODY REMOVAL performed by Ruben Hopkins MD at [address] EGD TRANSORAL BIOPSY SINGLE/MULTIPLE N/A 5/25/2018    EGD BIOPSY performed by Yarely Lo DO at [address] [facility]      left shoulder    UPPER GASTROINTESTINAL ENDOSCOPY  08/13/2018    removal of food bolus    UPPER GASTROINTESTINAL ENDOSCOPY N/A 8/13/2018    EGD FOREIGN BODY REMOVAL performed by Yarely Lo DO at [address] [place] [address] 8/13/2018    EGD BIOPSY performed by Yarely Lo DO at [address] [place] [address]  08/16/2018    egd with balloon dilitation    UPPER GASTROINTESTINAL ENDOSCOPY  8/16/2018    EGD DILATION BALLOON performed by Ruben Hopkins MD at [address] [place] [address] 10/1/2018    EGD BIOPSY performed by Kat Duff MD at [address] [place] [address]  10/1/2018    EGD DILATION BALLOON performed by Kat Duff MD at [address] [place] [address] N/A 11/19/2018    EGD DILATION BALLOON performed by Kat Duff MD at [address] [place] [address] N/A 10/15/2020    EGD SUBMUCOSAL/BOTOX INJECTION tattoo  performed by Unruly Fields MD at [facility]         FAMILY HISTORY           Problem Relation Age of Onset    Cancer Mother     Heart Attack Father Family Status   Relation Name Status    Mother      Father          SOCIAL HISTORY      reports that he quit smoking about 44 years ago. He has a 20.00 pack-year smoking history. He has never used smokeless tobacco. He reports that he does not drink alcohol or use drugs. REVIEW OF SYSTEMS    (2-9 systems for level 4, 10 or more for level 5)     Review of Systems   Constitutional: Negative for chills, fever and unexpected weight change. HENT: Negative for congestion, rhinorrhea, sinus pressure and sore throat. Respiratory: Negative for cough, shortness of breath and wheezing. Cardiovascular: Negative for chest pain and palpitations. Gastrointestinal: Negative for abdominal pain, constipation, diarrhea, nausea and vomiting. Genitourinary: Negative for dysuria and hematuria. Musculoskeletal: Negative for arthralgias and myalgias. Skin: Negative for color change and rash. Neurological: Positive for syncope. Negative for dizziness, weakness and headaches. Hematological: Negative for adenopathy. All other systems reviewed and are negative. Except as noted above the remainder of the review of systems was reviewed and negative. PHYSICAL EXAM    (up to 7 for level 4, 8 or more for level 5)     ED Triage Vitals   BP Temp Temp Source Pulse Resp SpO2 Height Weight   21 1728 21 1728 21 1747 21 1728 21 1728 21 1728 21 1747 21 1728   (!) 86/39 98.6 °F (37 °C) Oral 148 14 100 % 5' 10\" (1.778 m) 217 lb (98.4 kg)       Physical Exam  Vitals signs reviewed. Constitutional:       Appearance: He is well-developed. HENT:      Head: Normocephalic and atraumatic. Eyes:      Conjunctiva/sclera: Conjunctivae normal.      Pupils: Pupils are equal, round, and reactive to light. Neck:      Musculoskeletal: Normal range of motion and neck supple. Cardiovascular:      Rate and Rhythm: Normal rate and regular rhythm.    Pulmonary: Effort: Pulmonary effort is normal. No respiratory distress. Breath sounds: Normal breath sounds. No stridor. Abdominal:      General: Bowel sounds are normal.      Palpations: Abdomen is soft. Musculoskeletal: Normal range of motion. Lymphadenopathy:      Cervical: No cervical adenopathy. Skin:     General: Skin is warm and dry. Findings: No rash. Neurological:      Mental Status: He is alert and oriented to person, place, and time. DIAGNOSTIC RESULTS     EKG: All EKG's are interpreted by the Emergency Department Physician who either signs or Co-signs this chart in the absence of a cardiologist.    Atrial Fibrillation with slow ventricular response    RADIOLOGY:   Non-plain film images such as CT, Ultrasound and MRI are read by the radiologist. Plain radiographic images are visualized and preliminarily interpreted by the emergency physician with the below findings:    Ct Head Wo Contrast    Result Date: 4/7/2021  EXAMINATION: CT OF THE HEAD WITHOUT CONTRAST  4/7/2021 6:24 pm TECHNIQUE: CT of the head was performed without the administration of intravenous contrast. Dose modulation, iterative reconstruction, and/or weight based adjustment of the mA/kV was utilized to reduce the radiation dose to as low as reasonably achievable. COMPARISON: 09/25/2019 HISTORY: ORDERING SYSTEM PROVIDED HISTORY: Syncope, weakness TECHNOLOGIST PROVIDED HISTORY: Syncope, weakness Decision Support Exception->Emergency Medical Condition (MA) Reason for Exam: Pt states weakness and fatigue for 2 days, drooling today. No prior CVA Acuity: Acute Type of Exam: Initial FINDINGS: BRAIN/VENTRICLES: There is no acute intracranial hemorrhage, mass effect or midline shift. No abnormal extra-axial fluid collection. The gray-white differentiation is maintained without evidence of an acute infarct. There is no evidence of hydrocephalus.  Periventricular and deep subcortical white matter hypoattenuation, consistent microangiopathic change. Mild parenchymal volume loss. Atherosclerosis of the intracranial vasculature. Bilateral basal ganglia mineralization. ORBITS: The visualized portion of the orbits demonstrate no acute abnormality. SINUSES: The visualized paranasal sinuses and mastoid air cells demonstrate no acute abnormality. SOFT TISSUES/SKULL:  No acute abnormality of the visualized skull or soft tissues. No acute intracranial abnormality. Microangiopathic change. Xr Chest Portable    Result Date: 4/7/2021  EXAMINATION: ONE XRAY VIEW OF THE CHEST 4/7/2021 2:59 pm COMPARISON: 02/26/2021 HISTORY: ORDERING SYSTEM PROVIDED HISTORY: Chest Pain TECHNOLOGIST PROVIDED HISTORY: Chest Pain Reason for Exam: chest pain Mechanism of Injury: hypotension, dizziness, loss of consciousness FINDINGS: Chronic elevation of the right hemidiaphragm. The cardiac size is enlarged. No acute infiltrates or pleural effusions are seen. Pulmonary vascularity appears normal. There is mild ectasia of the thoracic aorta. There are degenerative changes in the spine and shoulders with postsurgical changes in the left humeral head. .No acute bony abnormalities.  The hilar structures are normal.     No acute cardiopulmonary disease Stable cardiomegaly     Interpretation per the Radiologist below, if available at the time of this note:    XR CHEST PORTABLE   Final Result   No acute cardiopulmonary disease      Stable cardiomegaly         CT HEAD WO CONTRAST    (Results Pending)           LABS:  Labs Reviewed   CBC WITH AUTO DIFFERENTIAL - Abnormal; Notable for the following components:       Result Value    RBC 3.13 (*)     Hemoglobin 10.1 (*)     Hematocrit 32.8 (*)     .8 (*)     Platelets 117 (*)     Lymphocytes 19 (*)     Monocytes 13 (*)     Eosinophils % 5 (*)     Immature Granulocytes 1 (*)     Absolute Lymph # 0.72 (*)     All other components within normal limits   BASIC METABOLIC PANEL   TROP/MYOGLOBIN   BRAIN NATRIURETIC PEPTIDE

## 2021-04-08 LAB
EKG ATRIAL RATE: 45 BPM
EKG Q-T INTERVAL: 586 MS
EKG QRS DURATION: 138 MS
EKG QTC CALCULATION (BAZETT): 489 MS
EKG R AXIS: -79 DEGREES
EKG T AXIS: 36 DEGREES
EKG VENTRICULAR RATE: 42 BPM

## 2021-04-08 PROCEDURE — 2060000000 HC ICU INTERMEDIATE R&B

## 2021-04-08 PROCEDURE — 2500000003 HC RX 250 WO HCPCS: Performed by: INTERNAL MEDICINE

## 2021-04-08 PROCEDURE — 2580000003 HC RX 258: Performed by: NURSE PRACTITIONER

## 2021-04-08 PROCEDURE — 94640 AIRWAY INHALATION TREATMENT: CPT

## 2021-04-08 PROCEDURE — 6370000000 HC RX 637 (ALT 250 FOR IP): Performed by: INTERNAL MEDICINE

## 2021-04-08 PROCEDURE — 93010 ELECTROCARDIOGRAM REPORT: CPT | Performed by: INTERNAL MEDICINE

## 2021-04-08 RX ORDER — ROPINIROLE 0.25 MG/1
0.25 TABLET, FILM COATED ORAL 2 TIMES DAILY
Status: DISCONTINUED | OUTPATIENT
Start: 2021-04-08 | End: 2021-04-10 | Stop reason: HOSPADM

## 2021-04-08 RX ORDER — POLYVINYL ALCOHOL 14 MG/ML
1 SOLUTION/ DROPS OPHTHALMIC PRN
Status: DISCONTINUED | OUTPATIENT
Start: 2021-04-08 | End: 2021-04-10 | Stop reason: HOSPADM

## 2021-04-08 RX ORDER — ASCORBIC ACID 500 MG
500 TABLET ORAL DAILY
Status: DISCONTINUED | OUTPATIENT
Start: 2021-04-08 | End: 2021-04-10 | Stop reason: HOSPADM

## 2021-04-08 RX ORDER — BUDESONIDE AND FORMOTEROL FUMARATE DIHYDRATE 80; 4.5 UG/1; UG/1
2 AEROSOL RESPIRATORY (INHALATION) 2 TIMES DAILY
Status: DISCONTINUED | OUTPATIENT
Start: 2021-04-08 | End: 2021-04-10 | Stop reason: HOSPADM

## 2021-04-08 RX ORDER — TIZANIDINE 4 MG/1
4 TABLET ORAL NIGHTLY
Status: DISCONTINUED | OUTPATIENT
Start: 2021-04-08 | End: 2021-04-10 | Stop reason: HOSPADM

## 2021-04-08 RX ORDER — GABAPENTIN 300 MG/1
300 CAPSULE ORAL 3 TIMES DAILY
Status: DISCONTINUED | OUTPATIENT
Start: 2021-04-08 | End: 2021-04-10 | Stop reason: HOSPADM

## 2021-04-08 RX ORDER — BUMETANIDE 0.25 MG/ML
2 INJECTION, SOLUTION INTRAMUSCULAR; INTRAVENOUS ONCE
Status: COMPLETED | OUTPATIENT
Start: 2021-04-08 | End: 2021-04-08

## 2021-04-08 RX ORDER — BENZONATATE 100 MG/1
100 CAPSULE ORAL 3 TIMES DAILY PRN
Status: DISCONTINUED | OUTPATIENT
Start: 2021-04-08 | End: 2021-04-10 | Stop reason: HOSPADM

## 2021-04-08 RX ORDER — LANOLIN ALCOHOL/MO/W.PET/CERES
325 CREAM (GRAM) TOPICAL 2 TIMES DAILY WITH MEALS
Status: DISCONTINUED | OUTPATIENT
Start: 2021-04-08 | End: 2021-04-10 | Stop reason: HOSPADM

## 2021-04-08 RX ORDER — POLYVINYL ALCOHOL 14 MG/ML
1 SOLUTION/ DROPS OPHTHALMIC 4 TIMES DAILY
Status: DISCONTINUED | OUTPATIENT
Start: 2021-04-08 | End: 2021-04-10 | Stop reason: HOSPADM

## 2021-04-08 RX ORDER — BUMETANIDE 1 MG/1
1 TABLET ORAL DAILY
Status: DISCONTINUED | OUTPATIENT
Start: 2021-04-08 | End: 2021-04-10 | Stop reason: HOSPADM

## 2021-04-08 RX ORDER — POLYETHYLENE GLYCOL 3350 17 G/17G
17 POWDER, FOR SOLUTION ORAL DAILY
Status: DISCONTINUED | OUTPATIENT
Start: 2021-04-08 | End: 2021-04-10 | Stop reason: HOSPADM

## 2021-04-08 RX ORDER — ARMODAFINIL 200 MG/1
200 TABLET ORAL 2 TIMES DAILY
Status: DISCONTINUED | OUTPATIENT
Start: 2021-04-08 | End: 2021-04-10 | Stop reason: HOSPADM

## 2021-04-08 RX ORDER — FLUTICASONE PROPIONATE 50 MCG
2 SPRAY, SUSPENSION (ML) NASAL DAILY
Status: DISCONTINUED | OUTPATIENT
Start: 2021-04-08 | End: 2021-04-10 | Stop reason: HOSPADM

## 2021-04-08 RX ORDER — MULTIVITAMIN WITH IRON
1 TABLET ORAL DAILY
Status: DISCONTINUED | OUTPATIENT
Start: 2021-04-08 | End: 2021-04-10 | Stop reason: HOSPADM

## 2021-04-08 RX ORDER — PANTOPRAZOLE SODIUM 40 MG/1
40 TABLET, DELAYED RELEASE ORAL DAILY
Status: DISCONTINUED | OUTPATIENT
Start: 2021-04-08 | End: 2021-04-10 | Stop reason: HOSPADM

## 2021-04-08 RX ORDER — TAMSULOSIN HYDROCHLORIDE 0.4 MG/1
0.4 CAPSULE ORAL DAILY
Status: DISCONTINUED | OUTPATIENT
Start: 2021-04-08 | End: 2021-04-10 | Stop reason: HOSPADM

## 2021-04-08 RX ORDER — IPRATROPIUM BROMIDE AND ALBUTEROL SULFATE 2.5; .5 MG/3ML; MG/3ML
1 SOLUTION RESPIRATORY (INHALATION) EVERY 4 HOURS PRN
Status: DISCONTINUED | OUTPATIENT
Start: 2021-04-08 | End: 2021-04-10 | Stop reason: HOSPADM

## 2021-04-08 RX ORDER — UBIDECARENONE 75 MG
100 CAPSULE ORAL DAILY
Status: DISCONTINUED | OUTPATIENT
Start: 2021-04-08 | End: 2021-04-10 | Stop reason: HOSPADM

## 2021-04-08 RX ORDER — CETIRIZINE HYDROCHLORIDE 10 MG/1
10 TABLET ORAL DAILY
Status: DISCONTINUED | OUTPATIENT
Start: 2021-04-08 | End: 2021-04-10 | Stop reason: HOSPADM

## 2021-04-08 RX ORDER — CARBOXYMETHYLCELLULOSE SODIUM 5 MG/ML
1 SOLUTION/ DROPS OPHTHALMIC 4 TIMES DAILY
Status: DISCONTINUED | OUTPATIENT
Start: 2021-04-08 | End: 2021-04-08 | Stop reason: SDUPTHER

## 2021-04-08 RX ADMIN — PANCRELIPASE 24000 UNITS: 24000; 76000; 120000 CAPSULE, DELAYED RELEASE PELLETS ORAL at 17:09

## 2021-04-08 RX ADMIN — ROPINIROLE HYDROCHLORIDE 0.25 MG: 0.25 TABLET, FILM COATED ORAL at 12:14

## 2021-04-08 RX ADMIN — POLYVINYL ALCOHOL 1 DROP: 14 SOLUTION/ DROPS OPHTHALMIC at 20:16

## 2021-04-08 RX ADMIN — MULTIVITAMIN TABLET 1 TABLET: TABLET at 12:14

## 2021-04-08 RX ADMIN — CETIRIZINE HYDROCHLORIDE 10 MG: 10 TABLET, FILM COATED ORAL at 12:14

## 2021-04-08 RX ADMIN — FERROUS SULFATE TAB EC 325 MG (65 MG FE EQUIVALENT) 325 MG: 325 (65 FE) TABLET DELAYED RESPONSE at 17:09

## 2021-04-08 RX ADMIN — PANTOPRAZOLE SODIUM 40 MG: 40 TABLET, DELAYED RELEASE ORAL at 12:16

## 2021-04-08 RX ADMIN — SODIUM CHLORIDE, PRESERVATIVE FREE 10 ML: 5 INJECTION INTRAVENOUS at 09:19

## 2021-04-08 RX ADMIN — TIZANIDINE 4 MG: 4 TABLET ORAL at 20:15

## 2021-04-08 RX ADMIN — OXYCODONE HYDROCHLORIDE AND ACETAMINOPHEN 500 MG: 500 TABLET ORAL at 12:14

## 2021-04-08 RX ADMIN — GABAPENTIN 300 MG: 300 CAPSULE ORAL at 12:14

## 2021-04-08 RX ADMIN — BUMETANIDE 2 MG: 0.25 INJECTION INTRAMUSCULAR; INTRAVENOUS at 12:14

## 2021-04-08 RX ADMIN — GABAPENTIN 300 MG: 300 CAPSULE ORAL at 20:16

## 2021-04-08 RX ADMIN — ROPINIROLE HYDROCHLORIDE 0.25 MG: 0.25 TABLET, FILM COATED ORAL at 20:16

## 2021-04-08 RX ADMIN — TAMSULOSIN HYDROCHLORIDE 0.4 MG: 0.4 CAPSULE ORAL at 20:15

## 2021-04-08 RX ADMIN — BISACODYL 5 MG: 5 TABLET, COATED ORAL at 20:15

## 2021-04-08 RX ADMIN — PANCRELIPASE 24000 UNITS: 24000; 76000; 120000 CAPSULE, DELAYED RELEASE PELLETS ORAL at 12:14

## 2021-04-08 RX ADMIN — FERROUS SULFATE TAB EC 325 MG (65 MG FE EQUIVALENT) 325 MG: 325 (65 FE) TABLET DELAYED RESPONSE at 12:16

## 2021-04-08 RX ADMIN — VITAM B12 100 MCG: 100 TAB at 12:14

## 2021-04-08 RX ADMIN — SODIUM CHLORIDE, PRESERVATIVE FREE 10 ML: 5 INJECTION INTRAVENOUS at 20:16

## 2021-04-08 RX ADMIN — BUDESONIDE AND FORMOTEROL FUMARATE DIHYDRATE 2 PUFF: 80; 4.5 AEROSOL RESPIRATORY (INHALATION) at 19:30

## 2021-04-08 RX ADMIN — POLYETHYLENE GLYCOL 3350 17 G: 17 POWDER, FOR SOLUTION ORAL at 20:15

## 2021-04-08 RX ADMIN — POLYVINYL ALCOHOL 1 DROP: 14 SOLUTION/ DROPS OPHTHALMIC at 12:14

## 2021-04-08 ASSESSMENT — PAIN SCALES - GENERAL
PAINLEVEL_OUTOF10: 0

## 2021-04-08 NOTE — PROGRESS NOTES
Transitions of Care Pharmacy Service   Medication Review    The patient's list of current home medications has been reviewed. Patient receives his maintenance medications in a compliance pack from [facility]. Source(s) of information: Patient/ Surescripts    Based on information provided by the above source(s), I have updated the patient's home med list as described below. Please review the ACTION REQUESTED section of this note below for any discrepancies on current hospital orders. I changed or updated the following medications on the patient's home medication list:  Removed Albuterol 2mg tabs - discontinued per pt. Bisacodyl 5mg - tx complete (colonoscopy)  Miralax 258 GM - tx complete (colonoscopy)     Added none     Adjusted   none   Other Notes none         PROVIDER ACTION REQUESTED  Medications that need to be addressed by a physician/nurse practitioner:    Medication Action Requested        none         Please feel free to call me with any questions about this encounter. Thank you.     Homer Xiong [facility]   Transitions of Care Pharmacy Service  Phone:  [phone]  Fax: [phone]      Electronically signed by Homer Xiong [facility] on 4/8/2021 at 11:48 AM           Medications Prior to Admission: benzonatate (TESSALON) 100 MG capsule, TAKE 1 CAPSULE BY MOUTH THREE TIMES DAILY AS NEEDED FOR COUGH  gabapentin (NEURONTIN) 300 MG capsule, TAKE 1 CAPSULE BY MOUTH THREE TIMES DAILY  vitamin B-12 (CYANOCOBALAMIN) 100 MCG tablet, TAKE 1 TABLET BY MOUTH DAILY AS NEEDED FOR FATIGUE  tamsulosin (FLOMAX) 0.4 MG capsule, Take 1 capsule by mouth daily  bumetanide (BUMEX) 1 MG tablet, Take 2 mg of Bumex in morning, 1 mg of Bumex in afternoon, take 1 mg in evening if weight is >3 above baseline weight  metoprolol succinate (TOPROL XL) 25 MG extended release tablet, TAKE ONE TABLET BY MOUTH TWICE DAILY (Patient taking differently: 50 mg nightly )  pantoprazole (PROTONIX) 40 MG tablet, TAKE 1 TABLET BY MOUTH DAILY  XARELTO 20 MG TABS tablet, TAKE 1 TABLET BY MOUTH DAILY  CREON 84386-30699 units delayed release capsule, TAKE 1 CAPSULE BY MOUTH THREE TIMES DAILY WITH MEALS  rOPINIRole (REQUIP) 0.25 MG tablet, TAKE 1 TABLET BY MOUTH TWICE DAILY  tiZANidine (ZANAFLEX) 4 MG tablet, TAKE 1 TABLET BY MOUTH DAILY AS NEEDED  carboxymethylcellulose (REFRESH PLUS) 0.5 % SOLN ophthalmic solution, Apply 1 drop to eye 4 times daily  Menthol-Methyl Salicylate (THERA-GESIC) 0.5-15 % CREA, Apply topically 2 times daily as needed  potassium chloride (KLOR-CON M) 20 MEQ extended release tablet, Take 1 tablet by mouth 2 times daily  Multiple Vitamin (MULTI-VITAMINS) TABS, Take 1 tablet by mouth daily  cetirizine (ZYRTEC) 10 MG tablet, TAKE 1 TABLET BY MOUTH DAILY  fluticasone (FLONASE) 50 MCG/ACT nasal spray, 2 sprays by Nasal route daily  ascorbic acid (VITAMIN C) 500 MG tablet, Take 1 tablet by mouth daily  ferrous sulfate (FE TABS 325) 325 (65 Fe) MG EC tablet, Take 1 tablet by mouth 2 times daily (with meals)  SM LUBRICANT EYE DROPS 0.4-0.3 % ophthalmic solution, PLACE 1 DROP INTO BOTH EYES FOUR TIMES DAILY AS NEEDED FOR DRY EYES  Fluticasone furoate-vilanterol (BREO ELLIPTA) 200-25 MCG/INH AEPB inhaler, Inhale 1 puff into the lungs daily  Handicap Placard MISC, by Does not apply route  Incontinence Supply Disposable (INCONTINENCE BRIEF LARGE) MISC, To use as directed. Use 3x a day  Foot Care Products (TRI-BALANCE ORTHOTICS MENS) MISC, Provide insurance covered orthotics shoes, wear daily  ipratropium-albuterol (DUONEB) 0.5-2.5 (3) MG/3ML SOLN nebulizer solution, Inhale 1 vial into the lungs every 4 hours as needed   Armodafinil 200 MG TABS, Take 1 tablet by mouth 2 times daily.

## 2021-04-08 NOTE — CARE COORDINATION
Case Management Initial Discharge Plan  Meenu Pena,         Readmission Risk              Risk of Unplanned Readmission:        21             Met with:patient to discuss discharge plans. Information verified: address, contacts, phone number, , insurance Yes  PCP: Wesley Giraldo PA-C    Date of last visit: 3/22/21    Insurance Provider: [facility] Medicare    Discharge Planning  Current Residence:   house  Living Arrangements:   family   Home has 2 stories/5 stairs to climb with handrail  Support Systems:   family  Current Services PTA:   no  Patient able to perform ADL's:Independent  DME used to aid ambulation prior to admission: 2ww, wheelchair, cane, elevated toilet seat, grab bars in shower, hospital bed[facility]  During admission: 2ww    Potential Assistance Needed:   home care vs snf    Pharmacy: [facility] and they deliver   Potential Assistance Purchasing Medications:   no  Does patient want to participate in local refill/ meds to beds program?   no    Patient agreeable to home care: Yes  Freedom of choice provided:  yes      Type of Home Care Services:     Patient expects to be discharged to:   home    Prior SNF/Rehab Placement and Facility: yes-[facility]-would need snf list pending recommendations  Agreeable to SNF/Rehab: tbd  [place] of choice provided: yes   Evaluation: yes    Expected Discharge date: Follow Up Appointment: Best Day/ Time:      Transportation provider: family  Transportation arrangements needed for discharge: tbd    Discharge Plan:   Patient lives at home with family in 2 story home with 5 steps to enter with handrail. Patient was independent with adl's and uses 2ww. Patient stated [facility] had [facility] in the past and would be agreeable to services pending discharge needs. Patient denied any drug or alcohol use. SBIRT completed.  Patient is admitted for a-fib with ventricular response and syncope and collapse. Patient's cardiologist is Dr. Shira Jones who prescribes Xarelto. Patient has cardiology consult pending. Discharge needs tbd.         Electronically signed by LOUIS Ga on 4/8/21 at 9:28 AM EDT

## 2021-04-08 NOTE — ED NOTES
Patient placed on 2L O2 per nasal cannula. Wears CPAP at home. RN spoke to respiratory who recommended that patient attempt to bring in home unit.       Dollene Cogan, RN  04/08/21 1037

## 2021-04-08 NOTE — PLAN OF CARE
Problem: Falls - Risk of:  Goal: Will remain free from falls  Description: Will remain free from falls  Outcome: Ongoing     Problem:  Activity:  Goal: Ability to tolerate increased activity will improve  Description: Ability to tolerate increased activity will improve  Outcome: Ongoing  Goal: Expression of feelings of increased energy will increase  Description: Expression of feelings of increased energy will increase  Outcome: Ongoing     Problem: Safety:  Goal: Ability to remain free from injury will improve  Description: Ability to remain free from injury will improve  Outcome: Ongoing  Goal: Will show no signs and symptoms of excessive bleeding  Description: Will show no signs and symptoms of excessive bleeding  Outcome: Ongoing

## 2021-04-08 NOTE — PROGRESS NOTES
Patient admitted to 2035 from ER for a fib with slow ventricular response. HR upon arrival 40-50's, all other vitals stable on 2L NC, denies pain. Patient reports feeling dizzy at times. Patient is alert and oriented, RN oriented him to room and call light. RN reviewed admission questions, will contact MD to notify him of arrival to unit.

## 2021-04-08 NOTE — H&P
Cardiovascular H and P Note     TODAY'S DATE: 4/8/2021    Patient name: Fred Fortune   YOB: 1945  Date of admission:  4/7/2021       Patient seen, examined. Previous clinical entries reviewed. All available laboratory, imaging and ancillary data reviewed. History of present Illness:     Fred Fortune is a 77 y.o. male with past medical history significant for atrial fibrillation and sick sinus syndrome with diastolic heart failure in the past on chronic diuretic therapy who has been feeling fatigued over the last 2 weeks or so. His blood pressure was also noted to be on the lower side. He noticed yesterday that his heart rate was in the 40s and 50s and called our office and was directed to go to the emergency room for further care. In the emergency room, he was noted to have a very low heart rate with blood pressure ranging in the 80s and 90s. He was admitted for further care. His metoprolol was held. His lisinopril had been discontinued over the last 2 weeks. He currently denies any acute chest pain. On routine evaluation, he does have chronically elevated B natruretic peptide levels. His history is remarkable for atrial fibrillation in the past with very difficult to control heart rates. He has been on Xarelto for anticoagulation.   He he also has chronic difficulty controlled diastolic heart failure who has had multiple admissions in the past with IV diuretic use in the past.      Past Medical History:    has a past medical history of Acute superficial gastritis without hemorrhage, Anastomotic stricture of stomach, Asthma, Atrial fibrillation ([address] Calculus of gallbladder without cholecystitis without obstruction, Chronic diastolic heart failure ([address] Chronic rhinosinusitis, Class 2 severe obesity due to excess calories with serious comorbidity and body mass index (BMI) of 36.0 to 36.9 in [facility]), COPD (chronic obstructive pulmonary disease) ([address] E. coli septicemia ([facility] E. coli UTI, Foot drop, right, Fracture of femur ([address] Gait disorder, Gastric out let obstruction, GERD (gastroesophageal reflux disease), Hallux valgus, acquired, bilateral, Hyperlipidemia, Hypertension, Impaired hearing, Internal hemorrhoids, Lymphedema of both lower extremities, Nausea & vomiting, OA (osteoarthritis) of knee, bilateral, Obesity, MARIAMA on CPAP, Osteoarthritis, Osteoarthritis of lumbar spine, S/P revision of total knee, Septicemia due to E. coli ([facility] [address] Simple chronic bronchitis ([address] Slow transit constipation, Unspecified sleep apnea, and UTI (urinary tract infection).     Surgical History:     Past Surgical History:   Procedure Laterality Date    CARPAL TUNNEL RELEASE      bilateral    COLONOSCOPY      COLONOSCOPY N/A 4/5/2021    COLONOSCOPY DIAGNOSTIC performed by Gus Hu MD at [address] [facility]  01/02/2019    by Dr. Brian Smith N/A 1/2/2019    CYSTOSCOPY performed by Gracie Montoya MD at [address]    first knuckle right index finger   [address] [facility]    vertical banded gastroplasty   [address] REPLACEMENT  2004 & 2005    bilateral knees    [place] EGD [address] N/A 8/16/2018    EGD FOREIGN BODY REMOVAL performed by Aisha Currie MD at [address] EGD TRANSORAL BIOPSY SINGLE/MULTIPLE N/A 5/25/2018    EGD BIOPSY performed by Nelson Cruz DO at [address] [facility]      left shoulder    UPPER GASTROINTESTINAL ENDOSCOPY  08/13/2018    removal of food bolus    UPPER GASTROINTESTINAL ENDOSCOPY N/A 8/13/2018    EGD FOREIGN BODY REMOVAL performed by Nelson Cruz DO at [address] [place] N/A 8/13/2018    EGD BIOPSY performed by Nelson Cruz DO at [address]  08/16/2018    egd with balloon dilitation    UPPER GASTROINTESTINAL ENDOSCOPY  8/16/2018    EGD DILATION BALLOON performed by Toby Chawla MD at [address] [facility] 10/1/2018    EGD BIOPSY performed by Camilo Dee MD at [address] [facility]  10/1/2018    EGD DILATION BALLOON performed by Camilo Dee MD at [address] [facility] N/A 11/19/2018    EGD DILATION BALLOON performed by Camilo Dee MD at [address] [facility] 10/15/2020    EGD SUBMUCOSAL/BOTOX INJECTION tattoo  performed by Jes Lofton MD at [facility]       Medications:   Scheduled Meds:   sodium chloride flush  5-40 mL Intravenous 2 times per day    sodium chloride  250 mL Intravenous Once     Continuous Infusions:   sodium chloride        Outpatient Medications Marked as Taking for the 4/7/21 encounter [facility] Encounter)   Medication Sig Dispense Refill    benzonatate (TESSALON) 100 MG capsule TAKE 1 CAPSULE BY MOUTH THREE TIMES DAILY AS NEEDED FOR COUGH 30 capsule 3    gabapentin (NEURONTIN) 300 MG capsule TAKE 1 CAPSULE BY MOUTH THREE TIMES DAILY 84 capsule 2    polyethylene glycol (GLYCOLAX) 17 GM/SCOOP powder Use as directed by following your patient instructions given by office. 238 g 0    bisacodyl (DULCOLAX) 5 MG EC tablet TAKE 4 TABS AS DIRECTED BY PHYSICIAN OFFICE 4 tablet 0    vitamin B-12 (CYANOCOBALAMIN) 100 MCG tablet TAKE 1 TABLET BY MOUTH DAILY AS NEEDED FOR FATIGUE 30 tablet 5    tamsulosin (FLOMAX) 0.4 MG capsule Take 1 capsule by mouth daily 30 capsule 5    bisacodyl (DULCOLAX) 5 MG EC tablet TAKE 4 TABS AS DIRECTED BY PHYSICIAN OFFICE 4 tablet 0    polyethylene glycol (GLYCOLAX) 17 GM/SCOOP powder Use as directed by following your patient instructions given by office.  238 g 0    bumetanide (BUMEX) 1 MG tablet Take 2 mg of Bumex in morning, 1 mg of Bumex in afternoon, take 1 mg in evening if weight is >3 above baseline weight 120 tablet 0    metoprolol succinate (TOPROL XL) 25 MG extended release tablet TAKE ONE TABLET BY MOUTH TWICE DAILY (Patient taking differently: 50 mg nightly ) 56 tablet 2    pantoprazole (PROTONIX) 40 MG tablet TAKE 1 TABLET BY MOUTH DAILY 28 tablet 3    XARELTO 20 MG TABS tablet TAKE 1 TABLET BY MOUTH DAILY 28 tablet 3    CREON 65602-41276 units delayed release capsule TAKE 1 CAPSULE BY MOUTH THREE TIMES DAILY WITH MEALS 84 capsule 3    rOPINIRole (REQUIP) 0.25 MG tablet TAKE 1 TABLET BY MOUTH TWICE DAILY 56 tablet 3    tiZANidine (ZANAFLEX) 4 MG tablet TAKE 1 TABLET BY MOUTH DAILY AS NEEDED 28 tablet 3    carboxymethylcellulose (REFRESH PLUS) 0.5 % SOLN ophthalmic solution Apply 1 drop to eye 4 times daily      Menthol-Methyl Salicylate (THERA-GESIC) 0.5-15 % CREA Apply topically 2 times daily as needed      potassium chloride (KLOR-CON M) 20 MEQ extended release tablet Take 1 tablet by mouth 2 times daily 56 tablet 5    Multiple Vitamin (MULTI-VITAMINS) TABS Take 1 tablet by mouth daily 28 tablet 5    cetirizine (ZYRTEC) 10 MG tablet TAKE 1 TABLET BY MOUTH DAILY 28 tablet 5    fluticasone (FLONASE) 50 MCG/ACT nasal spray 2 sprays by Nasal route daily 16 g 3    ascorbic acid (VITAMIN C) 500 MG tablet Take 1 tablet by mouth daily 30 tablet 2    ferrous sulfate (FE TABS 325) 325 (65 Fe) MG EC tablet Take 1 tablet by mouth 2 times daily (with meals) 90 tablet 3    SM LUBRICANT EYE DROPS 0.4-0.3 % ophthalmic solution PLACE 1 DROP INTO BOTH EYES FOUR TIMES DAILY AS NEEDED FOR DRY EYES 15 mL 3    Fluticasone furoate-vilanterol (BREO ELLIPTA) 200-25 MCG/INH AEPB inhaler Inhale 1 puff into the lungs daily      Handicap Placard MISC by Does not apply route 1 each 0    Incontinence Supply Disposable (INCONTINENCE BRIEF LARGE) MISC To use as directed.  Use 3x a day 90 each 3    Foot Care Products (TRI-BALANCE ORTHOTICS MENS) MISC Provide insurance covered orthotics shoes, wear daily 2 each 0    albuterol (PROVENTIL) 2 MG tablet Use as needed 1 tablet 0    ipratropium-albuterol (DUONEB) 0.5-2.5 (3) MG/3ML SOLN nebulizer solution Inhale 1 vial into the lungs every 4 hours as needed       Armodafinil 200 MG TABS Take 1 tablet by mouth 2 times daily. Allergies:   Darvocet [propoxyphene n-acetaminophen], Other, Oxycodone-acetaminophen, and Propoxyphene    Social History:    reports that he quit smoking about 44 years ago. He has a 20.00 pack-year smoking history. He has never used smokeless tobacco. He reports that he does not drink alcohol or use drugs. Family History:    family history includes Cancer in his mother; Heart Attack in his father. Review of Systems:     Constitutional: No fever/chills. HENT: No headache, neck pain or neck stiffnes. No sore throat or dysphagia. Eyes: No blurred vision. Respiratory: As above. Cardiovascular: As above. Gastrointestinal: Negative. Genitourinary: Negative  Endocrine: Negative. Musculoskeletal: Negative. Skin: Negative. Allergic/Immunologic: Negative. Neurologic: Negative. Hematological: Negative. Psychiatric: Negative. All other systems are are noted to be otherwise negative. Physical Exam:   /61   Pulse (!) 36   Temp 97.5 °F (36.4 °C) (Oral)   Resp 12   Ht 5' 10\" (1.778 m)   Wt 217 lb (98.4 kg)   SpO2 93%   BMI 31.14 kg/m²     Intake/Output Summary (Last 24 hours) at 4/8/2021 0952  Last data filed at 4/8/2021 0919  Gross per 24 hour   Intake 10 ml   Output 800 ml   Net -790 ml       GENERAL:  Alert, appropriate, oriented, in NAD. HEENT:  Head is atraumatic and normocephalic. No Pallor. No icterus. NECK: Supple without any thyromegaly. LUNGS: Generally decreased breath sounds. CARDIAC: S1, S2, regular rhythm. ABD:  Soft non-tender . EXT: Positive for edema. MS: No obvious deformities. SKIN: No obvious skin rashes.   NEURO: No focal neurologic deficits    Labs/ Ancillary data:     CBC:   Recent Labs     04/07/21  1802   WBC 3.8   HGB 10.1*   *     BMP:    Recent Labs     04/07/21  1802      K 3.8      CO2 21   BUN 29*   CREATININE 1.58*   GLUCOSE 88     Troponin:   Recent Labs     04/07/21  1802   TROPONINT NOT REPORTED       Imaging:    CXR: No acute infiltrates. EKG: Atrial fibrillation with slow ventricular response    Impression :     Sick sinus syndrome with significant bradycardia. Hypotension secondary to bradycardia. Chronic diastolic heart failure. Mild renal insufficiency-CKD stage III. Morbid obesity. Plan : We will hold metoprolol. We will hold Xarelto. We will schedule him to get a pacemaker to tomorrow. Further management after pacemaker placement. We will give 1 dose of IV diuretics. See orders on admission. Thank you very much for allowing us to participate in the care of this patient. Please call us with any questions.     Electronically signed by Tg García MD on 4/8/2021 at 11:01 AM

## 2021-04-09 ENCOUNTER — APPOINTMENT (OUTPATIENT)
Dept: GENERAL RADIOLOGY | Age: 76
DRG: 243 | End: 2021-04-09
Payer: MEDICARE

## 2021-04-09 ENCOUNTER — APPOINTMENT (OUTPATIENT)
Dept: CARDIAC CATH/INVASIVE PROCEDURES | Age: 76
DRG: 243 | End: 2021-04-09
Payer: MEDICARE

## 2021-04-09 LAB
ABSOLUTE EOS #: 0.19 K/UL (ref 0–0.44)
ABSOLUTE IMMATURE GRANULOCYTE: 0.01 K/UL (ref 0–0.3)
ABSOLUTE LYMPH #: 0.91 K/UL (ref 1.1–3.7)
ABSOLUTE MONO #: 0.55 K/UL (ref 0.1–1.2)
ANION GAP SERPL CALCULATED.3IONS-SCNC: 11 MMOL/L (ref 9–17)
ANION GAP SERPL CALCULATED.3IONS-SCNC: 9 MMOL/L (ref 9–17)
BASOPHILS # BLD: 1 % (ref 0–2)
BASOPHILS ABSOLUTE: 0.04 K/UL (ref 0–0.2)
BUN BLDV-MCNC: 17 MG/DL (ref 8–23)
BUN BLDV-MCNC: 20 MG/DL (ref 8–23)
BUN/CREAT BLD: 22 (ref 9–20)
BUN/CREAT BLD: 24 (ref 9–20)
CALCIUM SERPL-MCNC: 8.5 MG/DL (ref 8.6–10.4)
CALCIUM SERPL-MCNC: 8.6 MG/DL (ref 8.6–10.4)
CHLORIDE BLD-SCNC: 100 MMOL/L (ref 98–107)
CHLORIDE BLD-SCNC: 102 MMOL/L (ref 98–107)
CO2: 27 MMOL/L (ref 20–31)
CO2: 27 MMOL/L (ref 20–31)
CREAT SERPL-MCNC: 0.72 MG/DL (ref 0.7–1.2)
CREAT SERPL-MCNC: 0.92 MG/DL (ref 0.7–1.2)
DIFFERENTIAL TYPE: ABNORMAL
EOSINOPHILS RELATIVE PERCENT: 5 % (ref 1–4)
GFR AFRICAN AMERICAN: >60 ML/MIN
GFR AFRICAN AMERICAN: >60 ML/MIN
GFR NON-AFRICAN AMERICAN: >60 ML/MIN
GFR NON-AFRICAN AMERICAN: >60 ML/MIN
GFR SERPL CREATININE-BSD FRML MDRD: ABNORMAL ML/MIN/{1.73_M2}
GLUCOSE BLD-MCNC: 106 MG/DL (ref 70–99)
GLUCOSE BLD-MCNC: 79 MG/DL (ref 70–99)
HCT VFR BLD CALC: 31.2 % (ref 40.7–50.3)
HEMOGLOBIN: 9.9 G/DL (ref 13–17)
IMMATURE GRANULOCYTES: 0 %
INR BLD: 1.5
LYMPHOCYTES # BLD: 22 % (ref 24–43)
MAGNESIUM: 1.8 MG/DL (ref 1.6–2.6)
MAGNESIUM: 1.9 MG/DL (ref 1.6–2.6)
MCH RBC QN AUTO: 32.2 PG (ref 25.2–33.5)
MCHC RBC AUTO-ENTMCNC: 31.7 G/DL (ref 28.4–34.8)
MCV RBC AUTO: 101.6 FL (ref 82.6–102.9)
MONOCYTES # BLD: 13 % (ref 3–12)
NRBC AUTOMATED: 0 PER 100 WBC
PDW BLD-RTO: 14.2 % (ref 11.8–14.4)
PLATELET # BLD: 108 K/UL (ref 138–453)
PLATELET ESTIMATE: ABNORMAL
PMV BLD AUTO: 8.7 FL (ref 8.1–13.5)
POTASSIUM SERPL-SCNC: 3.4 MMOL/L (ref 3.7–5.3)
POTASSIUM SERPL-SCNC: 3.5 MMOL/L (ref 3.7–5.3)
PROTHROMBIN TIME: 17.7 SEC (ref 11.5–14.2)
RBC # BLD: 3.07 M/UL (ref 4.21–5.77)
RBC # BLD: ABNORMAL 10*6/UL
SEG NEUTROPHILS: 59 % (ref 36–65)
SEGMENTED NEUTROPHILS ABSOLUTE COUNT: 2.39 K/UL (ref 1.5–8.1)
SODIUM BLD-SCNC: 138 MMOL/L (ref 135–144)
SODIUM BLD-SCNC: 138 MMOL/L (ref 135–144)
WBC # BLD: 4.1 K/UL (ref 3.5–11.3)
WBC # BLD: ABNORMAL 10*3/UL

## 2021-04-09 PROCEDURE — 2580000003 HC RX 258: Performed by: INTERNAL MEDICINE

## 2021-04-09 PROCEDURE — 2500000003 HC RX 250 WO HCPCS

## 2021-04-09 PROCEDURE — 94640 AIRWAY INHALATION TREATMENT: CPT

## 2021-04-09 PROCEDURE — 33208 INSRT HEART PM ATRIAL & VENT: CPT | Performed by: INTERNAL MEDICINE

## 2021-04-09 PROCEDURE — 6360000002 HC RX W HCPCS: Performed by: INTERNAL MEDICINE

## 2021-04-09 PROCEDURE — 02H63JZ INSERTION OF PACEMAKER LEAD INTO RIGHT ATRIUM, PERCUTANEOUS APPROACH: ICD-10-PCS | Performed by: INTERNAL MEDICINE

## 2021-04-09 PROCEDURE — 6360000002 HC RX W HCPCS

## 2021-04-09 PROCEDURE — C1894 INTRO/SHEATH, NON-LASER: HCPCS

## 2021-04-09 PROCEDURE — 83735 ASSAY OF MAGNESIUM: CPT

## 2021-04-09 PROCEDURE — 36415 COLL VENOUS BLD VENIPUNCTURE: CPT

## 2021-04-09 PROCEDURE — C1785 PMKR, DUAL, RATE-RESP: HCPCS

## 2021-04-09 PROCEDURE — 6370000000 HC RX 637 (ALT 250 FOR IP): Performed by: INTERNAL MEDICINE

## 2021-04-09 PROCEDURE — 6370000000 HC RX 637 (ALT 250 FOR IP): Performed by: NURSE PRACTITIONER

## 2021-04-09 PROCEDURE — 2500000003 HC RX 250 WO HCPCS: Performed by: INTERNAL MEDICINE

## 2021-04-09 PROCEDURE — 6360000004 HC RX CONTRAST MEDICATION

## 2021-04-09 PROCEDURE — 2060000000 HC ICU INTERMEDIATE R&B

## 2021-04-09 PROCEDURE — 85610 PROTHROMBIN TIME: CPT

## 2021-04-09 PROCEDURE — 0JH606Z INSERTION OF PACEMAKER, DUAL CHAMBER INTO CHEST SUBCUTANEOUS TISSUE AND FASCIA, OPEN APPROACH: ICD-10-PCS | Performed by: INTERNAL MEDICINE

## 2021-04-09 PROCEDURE — C1898 LEAD, PMKR, OTHER THAN TRANS: HCPCS

## 2021-04-09 PROCEDURE — 80048 BASIC METABOLIC PNL TOTAL CA: CPT

## 2021-04-09 PROCEDURE — 02HK3JZ INSERTION OF PACEMAKER LEAD INTO RIGHT VENTRICLE, PERCUTANEOUS APPROACH: ICD-10-PCS | Performed by: INTERNAL MEDICINE

## 2021-04-09 PROCEDURE — 85025 COMPLETE CBC W/AUTO DIFF WBC: CPT

## 2021-04-09 PROCEDURE — 71045 X-RAY EXAM CHEST 1 VIEW: CPT

## 2021-04-09 RX ORDER — SODIUM CHLORIDE 0.9 % (FLUSH) 0.9 %
5-40 SYRINGE (ML) INJECTION EVERY 12 HOURS SCHEDULED
Status: DISCONTINUED | OUTPATIENT
Start: 2021-04-09 | End: 2021-04-10 | Stop reason: HOSPADM

## 2021-04-09 RX ORDER — POTASSIUM CHLORIDE 20 MEQ/1
40 TABLET, EXTENDED RELEASE ORAL ONCE
Status: COMPLETED | OUTPATIENT
Start: 2021-04-09 | End: 2021-04-09

## 2021-04-09 RX ORDER — BUMETANIDE 0.25 MG/ML
2 INJECTION, SOLUTION INTRAMUSCULAR; INTRAVENOUS ONCE
Status: COMPLETED | OUTPATIENT
Start: 2021-04-09 | End: 2021-04-09

## 2021-04-09 RX ORDER — SODIUM CHLORIDE 9 MG/ML
25 INJECTION, SOLUTION INTRAVENOUS PRN
Status: DISCONTINUED | OUTPATIENT
Start: 2021-04-09 | End: 2021-04-10 | Stop reason: HOSPADM

## 2021-04-09 RX ORDER — SODIUM CHLORIDE 0.9 % (FLUSH) 0.9 %
5-40 SYRINGE (ML) INJECTION PRN
Status: DISCONTINUED | OUTPATIENT
Start: 2021-04-09 | End: 2021-04-10 | Stop reason: HOSPADM

## 2021-04-09 RX ADMIN — BUDESONIDE AND FORMOTEROL FUMARATE DIHYDRATE 2 PUFF: 80; 4.5 AEROSOL RESPIRATORY (INHALATION) at 08:57

## 2021-04-09 RX ADMIN — PANCRELIPASE 24000 UNITS: 24000; 76000; 120000 CAPSULE, DELAYED RELEASE PELLETS ORAL at 12:37

## 2021-04-09 RX ADMIN — BUDESONIDE AND FORMOTEROL FUMARATE DIHYDRATE 2 PUFF: 80; 4.5 AEROSOL RESPIRATORY (INHALATION) at 19:41

## 2021-04-09 RX ADMIN — MULTIVITAMIN TABLET 1 TABLET: TABLET at 09:57

## 2021-04-09 RX ADMIN — PANCRELIPASE 24000 UNITS: 24000; 76000; 120000 CAPSULE, DELAYED RELEASE PELLETS ORAL at 09:57

## 2021-04-09 RX ADMIN — POTASSIUM CHLORIDE 40 MEQ: 1500 TABLET, EXTENDED RELEASE ORAL at 17:08

## 2021-04-09 RX ADMIN — CETIRIZINE HYDROCHLORIDE 10 MG: 10 TABLET, FILM COATED ORAL at 09:23

## 2021-04-09 RX ADMIN — ACETAMINOPHEN 650 MG: 325 TABLET ORAL at 16:09

## 2021-04-09 RX ADMIN — FLUTICASONE PROPIONATE 2 SPRAY: 50 SPRAY, METERED NASAL at 09:28

## 2021-04-09 RX ADMIN — POLYVINYL ALCOHOL 1 DROP: 14 SOLUTION/ DROPS OPHTHALMIC at 09:28

## 2021-04-09 RX ADMIN — OXYCODONE HYDROCHLORIDE AND ACETAMINOPHEN 500 MG: 500 TABLET ORAL at 09:22

## 2021-04-09 RX ADMIN — VANCOMYCIN HYDROCHLORIDE 1500 MG: 5 INJECTION, POWDER, LYOPHILIZED, FOR SOLUTION INTRAVENOUS at 08:35

## 2021-04-09 RX ADMIN — SODIUM CHLORIDE, PRESERVATIVE FREE 10 ML: 5 INJECTION INTRAVENOUS at 09:45

## 2021-04-09 RX ADMIN — SODIUM CHLORIDE, PRESERVATIVE FREE 10 ML: 5 INJECTION INTRAVENOUS at 21:13

## 2021-04-09 RX ADMIN — POLYVINYL ALCOHOL 1 DROP: 14 SOLUTION/ DROPS OPHTHALMIC at 12:37

## 2021-04-09 RX ADMIN — FERROUS SULFATE TAB EC 325 MG (65 MG FE EQUIVALENT) 325 MG: 325 (65 FE) TABLET DELAYED RESPONSE at 17:08

## 2021-04-09 RX ADMIN — PANTOPRAZOLE SODIUM 40 MG: 40 TABLET, DELAYED RELEASE ORAL at 09:41

## 2021-04-09 RX ADMIN — POTASSIUM CHLORIDE 40 MEQ: 1500 TABLET, EXTENDED RELEASE ORAL at 09:23

## 2021-04-09 RX ADMIN — GABAPENTIN 300 MG: 300 CAPSULE ORAL at 09:23

## 2021-04-09 RX ADMIN — PANCRELIPASE 24000 UNITS: 24000; 76000; 120000 CAPSULE, DELAYED RELEASE PELLETS ORAL at 17:08

## 2021-04-09 RX ADMIN — BUMETANIDE 1 MG: 1 TABLET ORAL at 16:11

## 2021-04-09 RX ADMIN — GABAPENTIN 300 MG: 300 CAPSULE ORAL at 14:04

## 2021-04-09 RX ADMIN — TAMSULOSIN HYDROCHLORIDE 0.4 MG: 0.4 CAPSULE ORAL at 21:09

## 2021-04-09 RX ADMIN — POLYVINYL ALCOHOL 1 DROP: 14 SOLUTION/ DROPS OPHTHALMIC at 21:11

## 2021-04-09 RX ADMIN — ROPINIROLE HYDROCHLORIDE 0.25 MG: 0.25 TABLET, FILM COATED ORAL at 09:23

## 2021-04-09 RX ADMIN — VITAM B12 100 MCG: 100 TAB at 09:22

## 2021-04-09 RX ADMIN — BUMETANIDE 2 MG: 0.25 INJECTION INTRAMUSCULAR; INTRAVENOUS at 10:08

## 2021-04-09 RX ADMIN — ROPINIROLE HYDROCHLORIDE 0.25 MG: 0.25 TABLET, FILM COATED ORAL at 21:09

## 2021-04-09 RX ADMIN — TIZANIDINE 4 MG: 4 TABLET ORAL at 21:09

## 2021-04-09 RX ADMIN — POLYVINYL ALCOHOL 1 DROP: 14 SOLUTION/ DROPS OPHTHALMIC at 17:08

## 2021-04-09 RX ADMIN — FERROUS SULFATE TAB EC 325 MG (65 MG FE EQUIVALENT) 325 MG: 325 (65 FE) TABLET DELAYED RESPONSE at 09:41

## 2021-04-09 RX ADMIN — GABAPENTIN 300 MG: 300 CAPSULE ORAL at 21:09

## 2021-04-09 ASSESSMENT — PAIN DESCRIPTION - ONSET: ONSET: GRADUAL

## 2021-04-09 ASSESSMENT — PAIN SCALES - GENERAL
PAINLEVEL_OUTOF10: 2
PAINLEVEL_OUTOF10: 3
PAINLEVEL_OUTOF10: 0

## 2021-04-09 ASSESSMENT — PAIN DESCRIPTION - LOCATION: LOCATION: SHOULDER

## 2021-04-09 NOTE — PROGRESS NOTES
Cardiovascular progress Note          Patient name: Carmelo Herrera    YOB: 1945  Date of admission:  4/7/2021       Patient seen, examined. Previous clinical entries reviewed. All available laboratory, imaging and ancillary data reviewed. Subjective:      Patient had pacemaker placed. No acute issues. Systems review:  Constitutional: No fever/chills. HENT: No headache, neck pain or neck stiffness. No sore throat or dysphagia. Gastrointestinal: No abdominal pain, nausea or vomiting. Cardiac: As Above  Respiratory: As above  Neurologic: No new focal weakness or numbness  Psychiatric: Normal mood and mentation       Examination:   Vitals: /66   Pulse 70   Temp 98.5 °F (36.9 °C) (Oral)   Resp 16   Ht 5' 10\" (1.778 m)   Wt 224 lb 6.4 oz (101.8 kg)   SpO2 98%   BMI 32.20 kg/m²     Intake/Output Summary (Last 24 hours) at 4/9/2021 1438  Last data filed at 4/9/2021 1342  Gross per 24 hour   Intake 900 ml   Output 2025 ml   Net -1125 ml       General appearance: Comfortable in no apparent distress. HEENT:  Positive for pallor. No icterus  Neck: Supple. Lungs:Generally decreased breath sounds  Heart: S1,S2  Abdomen: Soft  Extremities: + peripheral edema  Skin: No obvious rashes. Musculoskeletal: No obvious deformities. Neurologic: No focal deficits.      Labs/ Ancillary data:     CBC:   Recent Labs     04/07/21  1802 04/09/21  0639   WBC 3.8 4.1   HGB 10.1* 9.9*   * 108*     BMP:    Recent Labs     04/07/21  1802 04/09/21  0639    138   K 3.8 3.5*    102   CO2 21 27   BUN 29* 20   CREATININE 1.58* 0.92   GLUCOSE 88 106*     Troponin:   Recent Labs     04/07/21  1802   TROPONINT NOT REPORTED     INR:   Recent Labs     04/09/21  0639   INR 1.5       Medications:   Scheduled Meds:   sodium chloride flush  5-40 mL Intravenous 2 times per day    Armodafinil  200 mg Mouth/Throat BID    ascorbic acid  500 mg Oral Daily    bumetanide  1 mg Oral Daily   

## 2021-04-09 NOTE — PROCEDURES
Procedure: Dual chamber pacer placement. Indication:  Sick sinus syndrome, Bradycardia, Heart failure      Method: After informed consent was obtained, patient was brought to EP lab and attached to electrocardiographic and hemodynamic monitoring. Conscious sedation was performed with Versed and Fentanyl. Left deltopectoral area was prepped and draped in the normal fashion. Access was obtained in the left subclavian vein and a guidewire was retained in place. Following that a transverse incision was Made and the guidewire was tunneled into incision. Using a standar sheath, an additional guidewire was retained in place. Using one retained guidewire and a standard sheath, a ventricular lead was sutured in place after after suitable pacing and sensing thresholds were obtained. Using the second retained guidewire, an atrial lead was advance to the right atrium and sutured in place after suitable pacing and sensing thresholds were obtained. Wound was irrigated with antibiotic solution, the leads were attached to the generator and the wound was closed with 2 layers of 2.0 Vicryl and one layer of 4.0 Vicryl. Steri-strip was placed over the incision and a pressure dressing was applied over the wound. Medications: Versed 2 mg, Fentanyl 50 mcg, Vancomycin 1.5 gm     Technical data:   Generator:New device -- [employer] MRI, Model #: [id number], Serial [id number]  Right Atrial lead:[employer] Model #: Tendril STS [id number] cm Serial #: [id number], Impedance - 390 ohms, p-wave <0.2 mV, Threshold - NP  Right ventricular lead: [employer]  - Model #: Tendril STS [id number] cm Serial #: [id number], Impedance - 510 ohms, R-wave 4.2 mV, Threshold - 0.75 V @ 0.5 mS       Complications: None    Blood loss: Less than 20 mL. Impression:   Successful implantation of dual chamber pacemaker. Plan:   Follow up chest X-ray. Wound check, Device check tomorrow.     Electronically signed by Quinten Corral MD on 4/9/2021 at 8:39 AM

## 2021-04-09 NOTE — PROGRESS NOTES
Patient arrived to unit from cath lab at this time. Per Report:   Patient underwent pacemaker placement with Dr. Montejo Hollow received 2 of Versed,  50 of Fentanyl, 1.5 G Vancomycin started. No complications noted to site at this time and distal pulses remain palpable. Pt denies all pain and complaints at this time. See flowsheets for further info. Writer will continue to monitor.

## 2021-04-10 ENCOUNTER — APPOINTMENT (OUTPATIENT)
Dept: GENERAL RADIOLOGY | Age: 76
DRG: 243 | End: 2021-04-10
Payer: MEDICARE

## 2021-04-10 VITALS
HEIGHT: 70 IN | TEMPERATURE: 97.9 F | WEIGHT: 224.4 LBS | DIASTOLIC BLOOD PRESSURE: 67 MMHG | HEART RATE: 70 BPM | SYSTOLIC BLOOD PRESSURE: 114 MMHG | RESPIRATION RATE: 16 BRPM | OXYGEN SATURATION: 97 % | BODY MASS INDEX: 32.13 KG/M2

## 2021-04-10 LAB
ABSOLUTE EOS #: 0.27 K/UL (ref 0–0.44)
ABSOLUTE IMMATURE GRANULOCYTE: 0.01 K/UL (ref 0–0.3)
ABSOLUTE LYMPH #: 0.79 K/UL (ref 1.1–3.7)
ABSOLUTE MONO #: 0.54 K/UL (ref 0.1–1.2)
ANION GAP SERPL CALCULATED.3IONS-SCNC: 6 MMOL/L (ref 9–17)
BASOPHILS # BLD: 1 % (ref 0–2)
BASOPHILS ABSOLUTE: <0.03 K/UL (ref 0–0.2)
BUN BLDV-MCNC: 15 MG/DL (ref 8–23)
BUN/CREAT BLD: 19 (ref 9–20)
CALCIUM SERPL-MCNC: 8.4 MG/DL (ref 8.6–10.4)
CHLORIDE BLD-SCNC: 100 MMOL/L (ref 98–107)
CO2: 30 MMOL/L (ref 20–31)
CREAT SERPL-MCNC: 0.77 MG/DL (ref 0.7–1.2)
DIFFERENTIAL TYPE: ABNORMAL
EOSINOPHILS RELATIVE PERCENT: 7 % (ref 1–4)
GFR AFRICAN AMERICAN: >60 ML/MIN
GFR NON-AFRICAN AMERICAN: >60 ML/MIN
GFR SERPL CREATININE-BSD FRML MDRD: ABNORMAL ML/MIN/{1.73_M2}
GFR SERPL CREATININE-BSD FRML MDRD: ABNORMAL ML/MIN/{1.73_M2}
GLUCOSE BLD-MCNC: 108 MG/DL (ref 70–99)
HCT VFR BLD CALC: 32.2 % (ref 40.7–50.3)
HEMOGLOBIN: 10 G/DL (ref 13–17)
IMMATURE GRANULOCYTES: 0 %
LYMPHOCYTES # BLD: 20 % (ref 24–43)
MCH RBC QN AUTO: 31.6 PG (ref 25.2–33.5)
MCHC RBC AUTO-ENTMCNC: 31.1 G/DL (ref 28.4–34.8)
MCV RBC AUTO: 101.9 FL (ref 82.6–102.9)
MONOCYTES # BLD: 14 % (ref 3–12)
NRBC AUTOMATED: 0 PER 100 WBC
PDW BLD-RTO: 13.8 % (ref 11.8–14.4)
PLATELET # BLD: 112 K/UL (ref 138–453)
PLATELET ESTIMATE: ABNORMAL
PMV BLD AUTO: 8.7 FL (ref 8.1–13.5)
POTASSIUM SERPL-SCNC: 4.3 MMOL/L (ref 3.7–5.3)
RBC # BLD: 3.16 M/UL (ref 4.21–5.77)
RBC # BLD: ABNORMAL 10*6/UL
SEG NEUTROPHILS: 58 % (ref 36–65)
SEGMENTED NEUTROPHILS ABSOLUTE COUNT: 2.38 K/UL (ref 1.5–8.1)
SODIUM BLD-SCNC: 136 MMOL/L (ref 135–144)
WBC # BLD: 4 K/UL (ref 3.5–11.3)
WBC # BLD: ABNORMAL 10*3/UL

## 2021-04-10 PROCEDURE — 36415 COLL VENOUS BLD VENIPUNCTURE: CPT

## 2021-04-10 PROCEDURE — 6370000000 HC RX 637 (ALT 250 FOR IP): Performed by: INTERNAL MEDICINE

## 2021-04-10 PROCEDURE — 2580000003 HC RX 258: Performed by: INTERNAL MEDICINE

## 2021-04-10 PROCEDURE — 94640 AIRWAY INHALATION TREATMENT: CPT

## 2021-04-10 PROCEDURE — 6370000000 HC RX 637 (ALT 250 FOR IP): Performed by: NURSE PRACTITIONER

## 2021-04-10 PROCEDURE — 85025 COMPLETE CBC W/AUTO DIFF WBC: CPT

## 2021-04-10 PROCEDURE — 71046 X-RAY EXAM CHEST 2 VIEWS: CPT

## 2021-04-10 PROCEDURE — 80048 BASIC METABOLIC PNL TOTAL CA: CPT

## 2021-04-10 RX ORDER — BUMETANIDE 1 MG/1
TABLET ORAL
Qty: 120 TABLET | Refills: 3 | Status: ON HOLD | OUTPATIENT
Start: 2021-04-10 | End: 2021-04-14 | Stop reason: HOSPADM

## 2021-04-10 RX ADMIN — POLYVINYL ALCOHOL 1 DROP: 14 SOLUTION/ DROPS OPHTHALMIC at 08:37

## 2021-04-10 RX ADMIN — PANCRELIPASE 24000 UNITS: 24000; 76000; 120000 CAPSULE, DELAYED RELEASE PELLETS ORAL at 12:44

## 2021-04-10 RX ADMIN — ACETAMINOPHEN 650 MG: 325 TABLET ORAL at 08:32

## 2021-04-10 RX ADMIN — BUDESONIDE AND FORMOTEROL FUMARATE DIHYDRATE 2 PUFF: 80; 4.5 AEROSOL RESPIRATORY (INHALATION) at 10:14

## 2021-04-10 RX ADMIN — SODIUM CHLORIDE, PRESERVATIVE FREE 10 ML: 5 INJECTION INTRAVENOUS at 08:38

## 2021-04-10 RX ADMIN — VITAM B12 100 MCG: 100 TAB at 08:32

## 2021-04-10 RX ADMIN — PANCRELIPASE 24000 UNITS: 24000; 76000; 120000 CAPSULE, DELAYED RELEASE PELLETS ORAL at 08:33

## 2021-04-10 RX ADMIN — ROPINIROLE HYDROCHLORIDE 0.25 MG: 0.25 TABLET, FILM COATED ORAL at 08:32

## 2021-04-10 RX ADMIN — PANTOPRAZOLE SODIUM 40 MG: 40 TABLET, DELAYED RELEASE ORAL at 06:16

## 2021-04-10 RX ADMIN — GABAPENTIN 300 MG: 300 CAPSULE ORAL at 13:57

## 2021-04-10 RX ADMIN — OXYCODONE HYDROCHLORIDE AND ACETAMINOPHEN 500 MG: 500 TABLET ORAL at 08:32

## 2021-04-10 RX ADMIN — GABAPENTIN 300 MG: 300 CAPSULE ORAL at 08:32

## 2021-04-10 RX ADMIN — BUMETANIDE 1 MG: 1 TABLET ORAL at 08:32

## 2021-04-10 RX ADMIN — FLUTICASONE PROPIONATE 2 SPRAY: 50 SPRAY, METERED NASAL at 08:37

## 2021-04-10 RX ADMIN — FERROUS SULFATE TAB EC 325 MG (65 MG FE EQUIVALENT) 325 MG: 325 (65 FE) TABLET DELAYED RESPONSE at 08:32

## 2021-04-10 RX ADMIN — MULTIVITAMIN TABLET 1 TABLET: TABLET at 08:32

## 2021-04-10 RX ADMIN — CETIRIZINE HYDROCHLORIDE 10 MG: 10 TABLET, FILM COATED ORAL at 08:32

## 2021-04-10 RX ADMIN — POLYVINYL ALCOHOL 1 DROP: 14 SOLUTION/ DROPS OPHTHALMIC at 13:58

## 2021-04-10 ASSESSMENT — PAIN SCALES - GENERAL: PAINLEVEL_OUTOF10: 2

## 2021-04-10 NOTE — PLAN OF CARE
Problem: Falls - Risk of:  Goal: Will remain free from falls  Description: Will remain free from falls  4/9/2021 2245 by Isidro Webster RN  Outcome: Ongoing  Note: Pt fall risk, fall band present, falling star, safety alarm activated and in use as needed. Hourly rounding performed. Pt encouraged to use call light. See [place] fall risk assessment. 4/9/2021 1729 by Kim De Oliveira RN  Outcome: Met This Shift  4/9/2021 1724 by Donald Reyes RN  Outcome: Ongoing  Note: Patient has been compliant with bed rest after pacemaker insertion and free of falls this shift. Goal: Absence of physical injury  Description: Absence of physical injury  Outcome: Ongoing  Note: Non-skid socks in place, up with assistance, bed in lowest position, bed exit & alarm as needed, provide toileting every 2 hours an d as needed. Problem: Activity:  Goal: Ability to tolerate increased activity will improve  Description: Ability to tolerate increased activity will improve  Outcome: Ongoing  Note: Assessed musculoskeletal functional level. Assessed ROM & ability to care for self. Goal: Expression of feelings of increased energy will increase  Description: Expression of feelings of increased energy will increase  Outcome: Ongoing     Problem: Cardiac:  Goal: Ability to maintain an adequate cardiac output will improve  Description: Ability to maintain an adequate cardiac output will improve  4/9/2021 2245 by Isidro Webster RN  Outcome: Ongoing  Note: Assess cardiac monitor, heart rate, & pulse. Assess for any arrhythmias. Assessed IV administration. O2 provided as needed. 4/9/2021 1724 by Donald Reyes RN  Outcome: Ongoing  Note: Increased pulse rate to 70 beats per minute with pacemaker.   Goal: Complications related to the disease process, condition or treatment will be avoided or minimized  Description: Complications related to the disease process, condition or treatment will be avoided or minimized  Outcome: Ongoing Problem: Coping:  Goal: Level of anxiety will decrease  Description: Level of anxiety will decrease  Outcome: Ongoing  Note: Assist pt in identifying triggers contributing to feelings of anxiety. Offered PRN anti-anxiety medications & non-pharmalogical measures. Distraction techniques offered. Decreased stimulation. Provided support & reassurance. Goal: General experience of comfort will improve  Description: General experience of comfort will improve  4/9/2021 2245 by Marly Tracy RN  Outcome: Ongoing  4/9/2021 1724 by Saqib Ace RN  Outcome: Ongoing  Intervention: Encourage verbalization of comfort needs  4/9/2021 1724 by Saqib Ace RN  Note: Patient has requested pain medication for pacemaker insertion site and received same, with relief of pain. Problem: Health Behavior:  Goal: Ability to manage health-related needs will improve  Description: Ability to manage health-related needs will improve  Outcome: Ongoing  Note: Identified & provided resources to assist in meeting health care needs in hospital and/or post-discharge. Problem: Safety:  Goal: Ability to remain free from injury will improve  Description: Ability to remain free from injury will improve  Outcome: Ongoing  Note: Non-skid socks in place, up with assistance, bed in lowest position, bed exit & alarm as needed, provide toileting every 2 hours an d as needed. Goal: Will show no signs and symptoms of excessive bleeding  Description: Will show no signs and symptoms of excessive bleeding  Outcome: Ongoing  Note: Monitor pt for excessive bleeding, signs of occult blood. Monitor coagulations studies & blood lab levels. Problem: Pain:  Goal: Pain level will decrease  Description: Pain level will decrease  Outcome: Ongoing  Note: Monitoring pain with each assessment and prn. CAMPBELL 0-10 pain scale utilized. Non-pharmacological measures to be encouraged prior to pharmacological measures.     Goal: Control of acute pain  Description: Control of acute pain  Outcome: Ongoing  Goal: Control of chronic pain  Description: Control of chronic pain  Outcome: Ongoing

## 2021-04-10 NOTE — FLOWSHEET NOTE
Patient was out having Xrays.  shared in silent prayer.  shares prayer card for possible follow.      04/10/21 1143   Encounter Summary   Services provided to: Patient not available   Referral/Consult From: Jelena   Continue Visiting   (4-10-21 PT: in Xray)   Complexity of Encounter Low   Length of Encounter 15 minutes   Routine   Type Initial   Assessment Unable to respond   Intervention Prayer

## 2021-04-11 ENCOUNTER — HOSPITAL ENCOUNTER (OUTPATIENT)
Age: 76
Setting detail: OBSERVATION
Discharge: HOME | End: 2021-04-14
Attending: EMERGENCY MEDICINE | Admitting: INTERNAL MEDICINE
Payer: MEDICARE

## 2021-04-11 DIAGNOSIS — L03.313 CELLULITIS OF CHEST WALL: Primary | ICD-10-CM

## 2021-04-11 PROCEDURE — 99283 EMERGENCY DEPT VISIT LOW MDM: CPT

## 2021-04-11 PROCEDURE — 84484 ASSAY OF TROPONIN QUANT: CPT

## 2021-04-11 PROCEDURE — 83605 ASSAY OF LACTIC ACID: CPT

## 2021-04-11 PROCEDURE — 85025 COMPLETE CBC W/AUTO DIFF WBC: CPT

## 2021-04-11 PROCEDURE — 80053 COMPREHEN METABOLIC PANEL: CPT

## 2021-04-11 ASSESSMENT — PAIN SCALES - GENERAL: PAINLEVEL_OUTOF10: 4

## 2021-04-12 ENCOUNTER — APPOINTMENT (OUTPATIENT)
Dept: CT IMAGING | Age: 76
End: 2021-04-12
Payer: MEDICARE

## 2021-04-12 ENCOUNTER — APPOINTMENT (OUTPATIENT)
Dept: GENERAL RADIOLOGY | Age: 76
End: 2021-04-12
Payer: MEDICARE

## 2021-04-12 PROBLEM — Z91.89: Status: ACTIVE | Noted: 2021-04-12

## 2021-04-12 LAB
ABSOLUTE EOS #: 0.26 K/UL (ref 0–0.44)
ABSOLUTE IMMATURE GRANULOCYTE: 0.03 K/UL (ref 0–0.3)
ABSOLUTE LYMPH #: 0.92 K/UL (ref 1.1–3.7)
ABSOLUTE MONO #: 0.66 K/UL (ref 0.1–1.2)
ALBUMIN SERPL-MCNC: 3.9 G/DL (ref 3.5–5.2)
ALBUMIN/GLOBULIN RATIO: ABNORMAL (ref 1–2.5)
ALP BLD-CCNC: 152 U/L (ref 40–129)
ALT SERPL-CCNC: 7 U/L (ref 5–41)
ANION GAP SERPL CALCULATED.3IONS-SCNC: 10 MMOL/L (ref 9–17)
AST SERPL-CCNC: 21 U/L
BASOPHILS # BLD: 1 % (ref 0–2)
BASOPHILS ABSOLUTE: 0.07 K/UL (ref 0–0.2)
BILIRUB SERPL-MCNC: 0.98 MG/DL (ref 0.3–1.2)
BUN BLDV-MCNC: 15 MG/DL (ref 8–23)
BUN/CREAT BLD: 19 (ref 9–20)
CALCIUM SERPL-MCNC: 9.2 MG/DL (ref 8.6–10.4)
CHLORIDE BLD-SCNC: 98 MMOL/L (ref 98–107)
CO2: 29 MMOL/L (ref 20–31)
CREAT SERPL-MCNC: 0.79 MG/DL (ref 0.7–1.2)
DIFFERENTIAL TYPE: ABNORMAL
EOSINOPHILS RELATIVE PERCENT: 5 % (ref 1–4)
GFR AFRICAN AMERICAN: >60 ML/MIN
GFR NON-AFRICAN AMERICAN: >60 ML/MIN
GFR SERPL CREATININE-BSD FRML MDRD: ABNORMAL ML/MIN/{1.73_M2}
GFR SERPL CREATININE-BSD FRML MDRD: ABNORMAL ML/MIN/{1.73_M2}
GLUCOSE BLD-MCNC: 108 MG/DL (ref 70–99)
HCT VFR BLD CALC: 37.3 % (ref 40.7–50.3)
HEMOGLOBIN: 11.7 G/DL (ref 13–17)
IMMATURE GRANULOCYTES: 1 %
LACTIC ACID, SEPSIS WHOLE BLOOD: NORMAL MMOL/L (ref 0.5–1.9)
LACTIC ACID, SEPSIS WHOLE BLOOD: NORMAL MMOL/L (ref 0.5–1.9)
LACTIC ACID, SEPSIS: 1.3 MMOL/L (ref 0.5–1.9)
LACTIC ACID, SEPSIS: 1.3 MMOL/L (ref 0.5–1.9)
LYMPHOCYTES # BLD: 17 % (ref 24–43)
MCH RBC QN AUTO: 31.6 PG (ref 25.2–33.5)
MCHC RBC AUTO-ENTMCNC: 31.4 G/DL (ref 28.4–34.8)
MCV RBC AUTO: 100.8 FL (ref 82.6–102.9)
MONOCYTES # BLD: 13 % (ref 3–12)
MYOGLOBIN: 73 NG/ML (ref 28–72)
MYOGLOBIN: 91 NG/ML (ref 28–72)
NRBC AUTOMATED: 0 PER 100 WBC
PDW BLD-RTO: 13.9 % (ref 11.8–14.4)
PLATELET # BLD: 167 K/UL (ref 138–453)
PLATELET ESTIMATE: ABNORMAL
PMV BLD AUTO: 8.6 FL (ref 8.1–13.5)
POTASSIUM SERPL-SCNC: 3.7 MMOL/L (ref 3.7–5.3)
PROCALCITONIN: 0.03 NG/ML
RBC # BLD: 3.7 M/UL (ref 4.21–5.77)
RBC # BLD: ABNORMAL 10*6/UL
SEG NEUTROPHILS: 63 % (ref 36–65)
SEGMENTED NEUTROPHILS ABSOLUTE COUNT: 3.35 K/UL (ref 1.5–8.1)
SODIUM BLD-SCNC: 137 MMOL/L (ref 135–144)
TOTAL PROTEIN: 7.1 G/DL (ref 6.4–8.3)
TROPONIN INTERP: ABNORMAL
TROPONIN T: ABNORMAL NG/ML
TROPONIN, HIGH SENSITIVITY: 48 NG/L (ref 0–22)
TROPONIN, HIGH SENSITIVITY: 52 NG/L (ref 0–22)
TROPONIN, HIGH SENSITIVITY: 52 NG/L (ref 0–22)
TROPONIN, HIGH SENSITIVITY: 53 NG/L (ref 0–22)
WBC # BLD: 5.3 K/UL (ref 3.5–11.3)
WBC # BLD: ABNORMAL 10*3/UL

## 2021-04-12 PROCEDURE — APPSS180 APP SPLIT SHARED TIME > 60 MINUTES: Performed by: NURSE PRACTITIONER

## 2021-04-12 PROCEDURE — 71260 CT THORAX DX C+: CPT

## 2021-04-12 PROCEDURE — 93005 ELECTROCARDIOGRAM TRACING: CPT | Performed by: EMERGENCY MEDICINE

## 2021-04-12 PROCEDURE — 6370000000 HC RX 637 (ALT 250 FOR IP): Performed by: INTERNAL MEDICINE

## 2021-04-12 PROCEDURE — G0378 HOSPITAL OBSERVATION PER HR: HCPCS

## 2021-04-12 PROCEDURE — 84145 PROCALCITONIN (PCT): CPT

## 2021-04-12 PROCEDURE — 83874 ASSAY OF MYOGLOBIN: CPT

## 2021-04-12 PROCEDURE — 99219 PR INITIAL OBSERVATION CARE/DAY 50 MINUTES: CPT | Performed by: INTERNAL MEDICINE

## 2021-04-12 PROCEDURE — 2500000003 HC RX 250 WO HCPCS: Performed by: INTERNAL MEDICINE

## 2021-04-12 PROCEDURE — 6370000000 HC RX 637 (ALT 250 FOR IP): Performed by: EMERGENCY MEDICINE

## 2021-04-12 PROCEDURE — 6370000000 HC RX 637 (ALT 250 FOR IP): Performed by: NURSE PRACTITIONER

## 2021-04-12 PROCEDURE — 6360000004 HC RX CONTRAST MEDICATION: Performed by: EMERGENCY MEDICINE

## 2021-04-12 PROCEDURE — 96375 TX/PRO/DX INJ NEW DRUG ADDON: CPT

## 2021-04-12 PROCEDURE — 2580000003 HC RX 258: Performed by: NURSE PRACTITIONER

## 2021-04-12 PROCEDURE — 6360000002 HC RX W HCPCS: Performed by: EMERGENCY MEDICINE

## 2021-04-12 PROCEDURE — 71045 X-RAY EXAM CHEST 1 VIEW: CPT

## 2021-04-12 PROCEDURE — 36415 COLL VENOUS BLD VENIPUNCTURE: CPT

## 2021-04-12 PROCEDURE — 87040 BLOOD CULTURE FOR BACTERIA: CPT

## 2021-04-12 PROCEDURE — G0378 HOSPITAL OBSERVATION PER HR: HCPCS | Performed by: NURSE PRACTITIONER

## 2021-04-12 PROCEDURE — 2580000003 HC RX 258: Performed by: EMERGENCY MEDICINE

## 2021-04-12 PROCEDURE — 96365 THER/PROPH/DIAG IV INF INIT: CPT

## 2021-04-12 PROCEDURE — 94640 AIRWAY INHALATION TREATMENT: CPT

## 2021-04-12 RX ORDER — TAMSULOSIN HYDROCHLORIDE 0.4 MG/1
0.4 CAPSULE ORAL DAILY
Status: DISCONTINUED | OUTPATIENT
Start: 2021-04-12 | End: 2021-04-14 | Stop reason: HOSPADM

## 2021-04-12 RX ORDER — SODIUM CHLORIDE 0.9 % (FLUSH) 0.9 %
10 SYRINGE (ML) INJECTION PRN
Status: DISCONTINUED | OUTPATIENT
Start: 2021-04-12 | End: 2021-04-14 | Stop reason: HOSPADM

## 2021-04-12 RX ORDER — POLYETHYLENE GLYCOL 3350 17 G/17G
17 POWDER, FOR SOLUTION ORAL DAILY PRN
Status: DISCONTINUED | OUTPATIENT
Start: 2021-04-12 | End: 2021-04-14 | Stop reason: HOSPADM

## 2021-04-12 RX ORDER — POTASSIUM CHLORIDE 20 MEQ/1
20 TABLET, EXTENDED RELEASE ORAL 2 TIMES DAILY
Status: DISCONTINUED | OUTPATIENT
Start: 2021-04-12 | End: 2021-04-14 | Stop reason: HOSPADM

## 2021-04-12 RX ORDER — GABAPENTIN 300 MG/1
300 CAPSULE ORAL 3 TIMES DAILY
Status: DISCONTINUED | OUTPATIENT
Start: 2021-04-12 | End: 2021-04-14 | Stop reason: HOSPADM

## 2021-04-12 RX ORDER — ROPINIROLE 0.25 MG/1
0.25 TABLET, FILM COATED ORAL 2 TIMES DAILY
Status: DISCONTINUED | OUTPATIENT
Start: 2021-04-12 | End: 2021-04-14 | Stop reason: HOSPADM

## 2021-04-12 RX ORDER — MULTIVITAMIN WITH IRON
1 TABLET ORAL DAILY
Status: DISCONTINUED | OUTPATIENT
Start: 2021-04-12 | End: 2021-04-14 | Stop reason: HOSPADM

## 2021-04-12 RX ORDER — PANTOPRAZOLE SODIUM 40 MG/1
40 TABLET, DELAYED RELEASE ORAL DAILY
Status: DISCONTINUED | OUTPATIENT
Start: 2021-04-12 | End: 2021-04-14 | Stop reason: HOSPADM

## 2021-04-12 RX ORDER — ARMODAFINIL 200 MG/1
1 TABLET ORAL 2 TIMES DAILY
Status: DISCONTINUED | OUTPATIENT
Start: 2021-04-12 | End: 2021-04-14 | Stop reason: HOSPADM

## 2021-04-12 RX ORDER — ACETAMINOPHEN 325 MG/1
650 TABLET ORAL EVERY 6 HOURS PRN
Status: DISCONTINUED | OUTPATIENT
Start: 2021-04-12 | End: 2021-04-14 | Stop reason: HOSPADM

## 2021-04-12 RX ORDER — ACETAMINOPHEN 650 MG/1
650 SUPPOSITORY RECTAL EVERY 6 HOURS PRN
Status: DISCONTINUED | OUTPATIENT
Start: 2021-04-12 | End: 2021-04-14 | Stop reason: HOSPADM

## 2021-04-12 RX ORDER — POLYVINYL ALCOHOL 14 MG/ML
1 SOLUTION/ DROPS OPHTHALMIC 4 TIMES DAILY
Status: DISCONTINUED | OUTPATIENT
Start: 2021-04-12 | End: 2021-04-14 | Stop reason: HOSPADM

## 2021-04-12 RX ORDER — POLYVINYL ALCOHOL 14 MG/ML
1 SOLUTION/ DROPS OPHTHALMIC PRN
Status: DISCONTINUED | OUTPATIENT
Start: 2021-04-12 | End: 2021-04-14 | Stop reason: HOSPADM

## 2021-04-12 RX ORDER — 0.9 % SODIUM CHLORIDE 0.9 %
1000 INTRAVENOUS SOLUTION INTRAVENOUS ONCE
Status: COMPLETED | OUTPATIENT
Start: 2021-04-12 | End: 2021-04-12

## 2021-04-12 RX ORDER — CETIRIZINE HYDROCHLORIDE 10 MG/1
10 TABLET ORAL DAILY
Status: DISCONTINUED | OUTPATIENT
Start: 2021-04-12 | End: 2021-04-14 | Stop reason: HOSPADM

## 2021-04-12 RX ORDER — BUDESONIDE AND FORMOTEROL FUMARATE DIHYDRATE 160; 4.5 UG/1; UG/1
2 AEROSOL RESPIRATORY (INHALATION) 2 TIMES DAILY
Status: DISCONTINUED | OUTPATIENT
Start: 2021-04-12 | End: 2021-04-14 | Stop reason: HOSPADM

## 2021-04-12 RX ORDER — BUMETANIDE 1 MG/1
1 TABLET ORAL DAILY
Status: DISCONTINUED | OUTPATIENT
Start: 2021-04-12 | End: 2021-04-13

## 2021-04-12 RX ORDER — POTASSIUM CHLORIDE 20 MEQ/1
40 TABLET, EXTENDED RELEASE ORAL PRN
Status: DISCONTINUED | OUTPATIENT
Start: 2021-04-12 | End: 2021-04-14 | Stop reason: HOSPADM

## 2021-04-12 RX ORDER — POTASSIUM CHLORIDE 7.45 MG/ML
10 INJECTION INTRAVENOUS PRN
Status: DISCONTINUED | OUTPATIENT
Start: 2021-04-12 | End: 2021-04-14 | Stop reason: HOSPADM

## 2021-04-12 RX ORDER — FLUTICASONE PROPIONATE 50 MCG
2 SPRAY, SUSPENSION (ML) NASAL DAILY
Status: DISCONTINUED | OUTPATIENT
Start: 2021-04-12 | End: 2021-04-14 | Stop reason: HOSPADM

## 2021-04-12 RX ORDER — ASCORBIC ACID 500 MG
500 TABLET ORAL DAILY
Status: DISCONTINUED | OUTPATIENT
Start: 2021-04-12 | End: 2021-04-14 | Stop reason: HOSPADM

## 2021-04-12 RX ORDER — ONDANSETRON 2 MG/ML
4 INJECTION INTRAMUSCULAR; INTRAVENOUS EVERY 6 HOURS PRN
Status: DISCONTINUED | OUTPATIENT
Start: 2021-04-12 | End: 2021-04-14 | Stop reason: HOSPADM

## 2021-04-12 RX ORDER — TIZANIDINE 4 MG/1
4 TABLET ORAL NIGHTLY
Status: DISCONTINUED | OUTPATIENT
Start: 2021-04-12 | End: 2021-04-14 | Stop reason: HOSPADM

## 2021-04-12 RX ORDER — DOXYCYCLINE 100 MG/1
100 CAPSULE ORAL EVERY 12 HOURS SCHEDULED
Status: DISCONTINUED | OUTPATIENT
Start: 2021-04-12 | End: 2021-04-12

## 2021-04-12 RX ORDER — BUMETANIDE 0.25 MG/ML
2 INJECTION, SOLUTION INTRAMUSCULAR; INTRAVENOUS ONCE
Status: COMPLETED | OUTPATIENT
Start: 2021-04-12 | End: 2021-04-12

## 2021-04-12 RX ORDER — NICOTINE 21 MG/24HR
1 PATCH, TRANSDERMAL 24 HOURS TRANSDERMAL DAILY PRN
Status: DISCONTINUED | OUTPATIENT
Start: 2021-04-12 | End: 2021-04-14 | Stop reason: HOSPADM

## 2021-04-12 RX ORDER — AMOXICILLIN AND CLAVULANATE POTASSIUM 875; 125 MG/1; MG/1
1 TABLET, FILM COATED ORAL EVERY 12 HOURS SCHEDULED
Status: DISCONTINUED | OUTPATIENT
Start: 2021-04-12 | End: 2021-04-14 | Stop reason: HOSPADM

## 2021-04-12 RX ORDER — METOPROLOL SUCCINATE 50 MG/1
50 TABLET, EXTENDED RELEASE ORAL NIGHTLY
Status: DISCONTINUED | OUTPATIENT
Start: 2021-04-12 | End: 2021-04-14 | Stop reason: HOSPADM

## 2021-04-12 RX ORDER — FLUTICASONE FUROATE AND VILANTEROL 200; 25 UG/1; UG/1
1 POWDER RESPIRATORY (INHALATION) DAILY
Status: DISCONTINUED | OUTPATIENT
Start: 2021-04-12 | End: 2021-04-12 | Stop reason: CLARIF

## 2021-04-12 RX ORDER — SODIUM CHLORIDE 0.9 % (FLUSH) 0.9 %
5-40 SYRINGE (ML) INJECTION EVERY 12 HOURS SCHEDULED
Status: DISCONTINUED | OUTPATIENT
Start: 2021-04-12 | End: 2021-04-14 | Stop reason: HOSPADM

## 2021-04-12 RX ORDER — MAGNESIUM SULFATE 1 G/100ML
1000 INJECTION INTRAVENOUS PRN
Status: DISCONTINUED | OUTPATIENT
Start: 2021-04-12 | End: 2021-04-14 | Stop reason: HOSPADM

## 2021-04-12 RX ORDER — ACETAMINOPHEN 500 MG
1000 TABLET ORAL ONCE
Status: COMPLETED | OUTPATIENT
Start: 2021-04-12 | End: 2021-04-12

## 2021-04-12 RX ORDER — 0.9 % SODIUM CHLORIDE 0.9 %
80 INTRAVENOUS SOLUTION INTRAVENOUS ONCE
Status: COMPLETED | OUTPATIENT
Start: 2021-04-12 | End: 2021-04-12

## 2021-04-12 RX ORDER — LANOLIN ALCOHOL/MO/W.PET/CERES
325 CREAM (GRAM) TOPICAL 2 TIMES DAILY WITH MEALS
Status: DISCONTINUED | OUTPATIENT
Start: 2021-04-12 | End: 2021-04-14 | Stop reason: HOSPADM

## 2021-04-12 RX ORDER — SODIUM CHLORIDE 9 MG/ML
25 INJECTION, SOLUTION INTRAVENOUS PRN
Status: DISCONTINUED | OUTPATIENT
Start: 2021-04-12 | End: 2021-04-14 | Stop reason: HOSPADM

## 2021-04-12 RX ORDER — PROMETHAZINE HYDROCHLORIDE 12.5 MG/1
12.5 TABLET ORAL EVERY 6 HOURS PRN
Status: DISCONTINUED | OUTPATIENT
Start: 2021-04-12 | End: 2021-04-14 | Stop reason: HOSPADM

## 2021-04-12 RX ADMIN — PANTOPRAZOLE SODIUM 40 MG: 40 TABLET, DELAYED RELEASE ORAL at 11:44

## 2021-04-12 RX ADMIN — BUDESONIDE AND FORMOTEROL FUMARATE DIHYDRATE 2 PUFF: 160; 4.5 AEROSOL RESPIRATORY (INHALATION) at 11:18

## 2021-04-12 RX ADMIN — TAMSULOSIN HYDROCHLORIDE 0.4 MG: 0.4 CAPSULE ORAL at 20:19

## 2021-04-12 RX ADMIN — TIZANIDINE 4 MG: 4 TABLET ORAL at 21:33

## 2021-04-12 RX ADMIN — IOPAMIDOL 75 ML: 755 INJECTION, SOLUTION INTRAVENOUS at 01:20

## 2021-04-12 RX ADMIN — POLYVINYL ALCOHOL 1 DROP: 14 SOLUTION/ DROPS OPHTHALMIC at 20:22

## 2021-04-12 RX ADMIN — POTASSIUM CHLORIDE 20 MEQ: 1500 TABLET, EXTENDED RELEASE ORAL at 11:44

## 2021-04-12 RX ADMIN — AMOXICILLIN AND CLAVULANATE POTASSIUM 1 TABLET: 875; 125 TABLET, FILM COATED ORAL at 20:19

## 2021-04-12 RX ADMIN — POLYVINYL ALCOHOL 1 DROP: 14 SOLUTION/ DROPS OPHTHALMIC at 11:46

## 2021-04-12 RX ADMIN — ACETAMINOPHEN 650 MG: 325 TABLET ORAL at 14:00

## 2021-04-12 RX ADMIN — SODIUM CHLORIDE, PRESERVATIVE FREE 10 ML: 5 INJECTION INTRAVENOUS at 01:21

## 2021-04-12 RX ADMIN — GABAPENTIN 300 MG: 300 CAPSULE ORAL at 20:19

## 2021-04-12 RX ADMIN — ROPINIROLE HYDROCHLORIDE 0.25 MG: 0.25 TABLET, FILM COATED ORAL at 11:44

## 2021-04-12 RX ADMIN — GABAPENTIN 300 MG: 300 CAPSULE ORAL at 11:43

## 2021-04-12 RX ADMIN — ROPINIROLE HYDROCHLORIDE 0.25 MG: 0.25 TABLET, FILM COATED ORAL at 20:21

## 2021-04-12 RX ADMIN — BUMETANIDE 1 MG: 1 TABLET ORAL at 11:43

## 2021-04-12 RX ADMIN — SODIUM CHLORIDE 1000 ML: 9 INJECTION, SOLUTION INTRAVENOUS at 00:40

## 2021-04-12 RX ADMIN — SODIUM CHLORIDE 80 ML: 9 INJECTION, SOLUTION INTRAVENOUS at 01:21

## 2021-04-12 RX ADMIN — POLYVINYL ALCOHOL 1 DROP: 14 SOLUTION/ DROPS OPHTHALMIC at 17:35

## 2021-04-12 RX ADMIN — PANCRELIPASE 24000 UNITS: 24000; 76000; 120000 CAPSULE, DELAYED RELEASE PELLETS ORAL at 17:35

## 2021-04-12 RX ADMIN — BUMETANIDE 2 MG: 0.25 INJECTION INTRAMUSCULAR; INTRAVENOUS at 17:35

## 2021-04-12 RX ADMIN — SODIUM CHLORIDE, PRESERVATIVE FREE 10 ML: 5 INJECTION INTRAVENOUS at 11:46

## 2021-04-12 RX ADMIN — SODIUM CHLORIDE, PRESERVATIVE FREE 10 ML: 5 INJECTION INTRAVENOUS at 17:34

## 2021-04-12 RX ADMIN — DOXYCYCLINE 100 MG: 100 CAPSULE ORAL at 11:44

## 2021-04-12 RX ADMIN — VANCOMYCIN HYDROCHLORIDE 2500 MG: 5 INJECTION, POWDER, LYOPHILIZED, FOR SOLUTION INTRAVENOUS at 02:35

## 2021-04-12 RX ADMIN — PANCRELIPASE 24000 UNITS: 24000; 76000; 120000 CAPSULE, DELAYED RELEASE PELLETS ORAL at 11:44

## 2021-04-12 RX ADMIN — ACETAMINOPHEN 1000 MG: 500 TABLET, COATED ORAL at 02:35

## 2021-04-12 RX ADMIN — SODIUM CHLORIDE, PRESERVATIVE FREE 10 ML: 5 INJECTION INTRAVENOUS at 21:34

## 2021-04-12 RX ADMIN — METOPROLOL SUCCINATE 50 MG: 50 TABLET, EXTENDED RELEASE ORAL at 20:19

## 2021-04-12 RX ADMIN — POTASSIUM CHLORIDE 20 MEQ: 1500 TABLET, EXTENDED RELEASE ORAL at 20:19

## 2021-04-12 RX ADMIN — FLUTICASONE PROPIONATE 2 SPRAY: 50 SPRAY, METERED NASAL at 20:19

## 2021-04-12 ASSESSMENT — ENCOUNTER SYMPTOMS
CHEST TIGHTNESS: 0
CONSTIPATION: 0
DIARRHEA: 0
COLOR CHANGE: 1
ABDOMINAL PAIN: 0
NAUSEA: 0
COUGH: 0
SHORTNESS OF BREATH: 0
WHEEZING: 0
VOMITING: 0
BLOOD IN STOOL: 0

## 2021-04-12 ASSESSMENT — PAIN DESCRIPTION - PROGRESSION
CLINICAL_PROGRESSION: NOT CHANGED

## 2021-04-12 ASSESSMENT — PAIN SCALES - GENERAL
PAINLEVEL_OUTOF10: 0
PAINLEVEL_OUTOF10: 4
PAINLEVEL_OUTOF10: 4

## 2021-04-12 ASSESSMENT — PAIN DESCRIPTION - FREQUENCY: FREQUENCY: CONTINUOUS

## 2021-04-12 ASSESSMENT — PAIN DESCRIPTION - ONSET: ONSET: ON-GOING

## 2021-04-12 ASSESSMENT — PAIN DESCRIPTION - LOCATION: LOCATION: CHEST

## 2021-04-12 ASSESSMENT — PAIN DESCRIPTION - DIRECTION
RADIATING_TOWARDS: NONRADIATING PER PATIENT
RADIATING_TOWARDS: NONRADIATING PER PATIENT

## 2021-04-12 ASSESSMENT — PAIN DESCRIPTION - PAIN TYPE: TYPE: ACUTE PAIN;SURGICAL PAIN

## 2021-04-12 NOTE — H&P
swelling yesterday morning. He endorses tenderness to the area. He states he had 1 occurrence of emesis yesterday, but attributes it to his previous gastric bypass surgery. He also states he bruises easily due to taking xarelto. He denies fever, chills, chest pain or shortness of breath. No additional symptomology or definitive modifying factors. He has past medical history that includes atrial fibrillation (Xarelto), CHF, MARIAMA with cpap use, COPD, hypertension, dyslipidemia, bilateral foot drop (wears braces to walk) and previous gastric bypass surgery. He follows outpatient with Dr. Napoleon Jernigan with cardiology and Dr. Andrea Friedman with GI. Lactic 1.3, high sensitivity troponin 52, 53, WBC 5.3, hbg 11.7, hct 37.3, platelet 114. CXR shows no acute process. CT chest with IV contrast shows:   1. Status post interval left chest wall transvenous cardiac device placement. Soft tissue density superficial to the cardiac device is presumably postoperative hematoma. Additionally, there is overlying skin thickening subcu induration which may represent edema or cellulitis. No discrete peripherally enhancing fluid collection to suggest abscess. 2. Cardiomegaly. Coronary artery disease. 3. Cholelithiasis. Echo 6.21.2020    Left Ventricle: Systolic function is normal with an ejection fraction of 65-70%.   Tricuspid Valve: The right ventricular systolic pressure is mild to moderately elevated. RVSP estimated at 30-35 mmHg.   Tricuspid Valve: There is mild to moderate pulmonary hypertension.     Past Medical History:     Past Medical History:   Diagnosis Date    Acute superficial gastritis without hemorrhage 5/26/2018    Anastomotic stricture of stomach     Asthma     Atrial fibrillation ([address]     On Xarelto 6/27/2020    Calculus of gallbladder without cholecystitis without obstruction 5/2/2017    Chronic diastolic heart failure ([address] 11/17/2017    Chronic rhinosinusitis 4/13/2015    Class 2 severe obesity due to excess calories with serious comorbidity and body mass index (BMI) of 36.0 to 36.9 in adult [facility]) 11/17/2017    COPD (chronic obstructive pulmonary disease) (HCC)     E. [facility] septicemia ([facility]     E. coli UTI     Foot drop, right 7/10/2012    Fracture of femur ([facility] 10/23/2016    Gait disorder 7/20/2016    Gastric out let obstruction     GERD (gastroesophageal reflux disease)     Hallux valgus, acquired, bilateral 6/24/2015    Hyperlipidemia     Hypertension     Impaired hearing 4/13/2015    Internal hemorrhoids 1/3/2017    Lymphedema of both lower extremities 6/24/2015    Nausea & vomiting 11/16/2018    OA (osteoarthritis) of knee, bilateral 12/11/2006    Obesity     MARIAMA on CPAP 5/2/2017    Osteoarthritis     Osteoarthritis of lumbar spine 12/13/2007     replace inactive diagnosis    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 coli ([facility])     Due to UTI     Simple chronic bronchitis ([facility] 4/10/2018    Slow transit constipation 11/3/2016    Unspecified sleep apnea     UTI (urinary tract infection)       Past Surgical History:     Past Surgical History:   Procedure Laterality Date    CARPAL TUNNEL RELEASE      bilateral    COLONOSCOPY      COLONOSCOPY N/A 04/05/2021    COLONOSCOPY DIAGNOSTIC performed by Ravin Harris MD at [address]  01/02/2019    by Dr. Lizz Lewis N/A 01/02/2019    CYSTOSCOPY performed by Brandon Harper MD at [id number]    first knuckle right index finger   [facility]    vertical banded gastroplasty   [address] REPLACEMENT  2004 & 2005    bilateral knees    [facility] [address] [facility] [address] N/A 08/16/2018    EGD FOREIGN BODY REMOVAL performed by Luci Whitlock MD at [address] EGD TRANSORAL BIOPSY SINGLE/MULTIPLE N/A 05/25/2018    EGD BIOPSY performed by Herminia Hurd DO at [address] [facility]  08/13/2018    removal of food bolus    UPPER GASTROINTESTINAL ENDOSCOPY N/A 08/13/2018    EGD FOREIGN BODY REMOVAL performed by Sha Silver DO at [address] N/A 08/13/2018    EGD BIOPSY performed by Sha Silver DO at [address]  08/16/2018    egd with balloon dilitation    UPPER GASTROINTESTINAL ENDOSCOPY  08/16/2018    EGD DILATION BALLOON performed by Amy Jules MD at [address] 10/01/2018    EGD BIOPSY performed by Carolynn Epstein MD at [address] [place] [address]  10/01/2018    EGD DILATION BALLOON performed by Carolynn Epstein MD at [address] [place] [address] N/A 11/19/2018    EGD DILATION BALLOON performed by Carolynn Epstein MD at [address] [place] [address] N/A 10/15/2020    EGD SUBMUCOSAL/BOTOX INJECTION tattoo  performed by Harley Samuel MD at [facility]      Medications Prior to Admission:     Prior to Admission medications    Medication Sig Start Date End Date Taking?  Authorizing Provider   bumetanide (BUMEX) 1 MG tablet Take 2 mg of Bumex in morning, 1 mg of Bumex in afternoon, take 1 mg in evening if weight is >3 above baseline weight 4/10/21   Alexandra Orantes MD   benzonatate (TESSALON) 100 MG capsule TAKE 1 CAPSULE BY MOUTH THREE TIMES DAILY AS NEEDED FOR COUGH 3/31/21   Buck Romero PA-C   gabapentin (NEURONTIN) 300 MG capsule TAKE 1 CAPSULE BY MOUTH THREE TIMES DAILY 3/31/21 6/29/21  Buck Romero PA-C   vitamin B-12 (CYANOCOBALAMIN) 100 MCG tablet TAKE 1 TABLET BY MOUTH DAILY AS NEEDED FOR FATIGUE 3/27/21   Buck Romero PA-C   tamsulosin [facility]) 0.4 MG capsule Take 1 capsule by mouth daily 3/12/21   Buck Romero PA-C   metoprolol succinate (TOPROL XL) 25 MG extended release tablet TAKE ONE TABLET BY MOUTH TWICE DAILY  Patient taking not nervous/anxious. All other systems reviewed and are negative. Physical Exam:   /70   Pulse 70   Temp 98 °F (36.7 °C)   Resp 14   Ht 5' 10.5\" (1.791 m)   Wt 212 lb (96.2 kg)   SpO2 96%   BMI 29.99 kg/m²   Temp (24hrs), Av °F (36.7 °C), Min:97.9 °F (36.6 °C), Max:98 °F (36.7 °C)    No results for input(s): POCGLU in the last 72 hours. Intake/Output Summary (Last 24 hours) at 2021 0455  Last data filed at 2021 0454  Gross per 24 hour   Intake --   Output 450 ml   Net -450 ml       Physical Exam  Vitals signs and nursing note reviewed. Constitutional:       Appearance: He is not diaphoretic. HENT:      Head: Normocephalic and atraumatic. Right Ear: Decreased hearing noted. Left Ear: Decreased hearing noted. Ears:      Comments: Wears hearing aides. Nose: Nose normal. No rhinorrhea. Eyes:      General: Lids are normal.      Extraocular Movements:      Right eye: Normal extraocular motion. Left eye: Normal extraocular motion. Conjunctiva/sclera: Conjunctivae normal.      Right eye: Right conjunctiva is not injected. Left eye: Left conjunctiva is not injected. Pupils: Pupils are equal, round, and reactive to light. Pupils are equal.      Right eye: Pupil is reactive. Left eye: Pupil is reactive. Neck:      Musculoskeletal: Neck supple. Thyroid: No thyromegaly. Trachea: Trachea normal. No tracheal deviation. Cardiovascular:      Rate and Rhythm: Normal rate and regular rhythm. Pulses: Normal pulses. Heart sounds: Normal heart sounds. Pulmonary:      Effort: Pulmonary effort is normal.      Breath sounds: No stridor. Examination of the right-lower field reveals decreased breath sounds. Examination of the left-lower field reveals decreased breath sounds. Decreased breath sounds present. Chest:      Comments: Left chest pacemaker site edematous, tender, ecchymosis extending down left lateral chest wall. Abdominal:      General: Bowel sounds are normal. There is no distension. Palpations: Abdomen is soft. There is no mass. Tenderness: There is no abdominal tenderness. There is no guarding. Musculoskeletal:         General: No tenderness. Right lower leg: 3+ Pitting Edema present. Left lower leg: 3+ Pitting Edema present. Skin:     General: Skin is warm and dry. Findings: Bruising present. Comments: Scattered bruises to bilateral upper extremities. Neurological:      Mental Status: He is alert and oriented to person, place, and time. He is not disoriented. Cranial Nerves: No cranial nerve deficit. Psychiatric:         Speech: Speech normal.         Behavior: Behavior normal. Behavior is cooperative.              Investigations:      Laboratory Testing:  Recent Results (from the past 24 hour(s))   CBC Auto Differential    Collection Time: 04/11/21 11:56 PM   Result Value Ref Range    WBC 5.3 3.5 - 11.3 k/uL    RBC 3.70 (L) 4.21 - 5.77 m/uL    Hemoglobin 11.7 (L) 13.0 - 17.0 g/dL    Hematocrit 37.3 (L) 40.7 - 50.3 %    .8 82.6 - 102.9 fL    MCH 31.6 25.2 - 33.5 pg    MCHC 31.4 28.4 - 34.8 g/dL    RDW 13.9 11.8 - 14.4 %    Platelets 108 042 - 685 k/uL    MPV 8.6 8.1 - 13.5 fL    NRBC Automated 0.0 0.0 per 100 WBC    Differential Type NOT REPORTED     Seg Neutrophils 63 36 - 65 %    Lymphocytes 17 (L) 24 - 43 %    Monocytes 13 (H) 3 - 12 %    Eosinophils % 5 (H) 1 - 4 %    Basophils 1 0 - 2 %    Immature Granulocytes 1 (H) 0 %    Segs Absolute 3.35 1.50 - 8.10 k/uL    Absolute Lymph # 0.92 (L) 1.10 - 3.70 k/uL    Absolute Mono # 0.66 0.10 - 1.20 k/uL    Absolute Eos # 0.26 0.00 - 0.44 k/uL    Basophils Absolute 0.07 0.00 - 0.20 k/uL    Absolute Immature Granulocyte 0.03 0.00 - 0.30 k/uL    WBC Morphology NOT REPORTED     RBC Morphology NOT REPORTED     Platelet Estimate NOT REPORTED    Comprehensive Metabolic Panel w/ Reflex to MG    Collection Time: 04/11/21 11:56 PM Result Value Ref Range    Glucose 108 (H) 70 - 99 mg/dL    BUN 15 8 - 23 mg/dL    CREATININE 0.79 0.70 - 1.20 mg/dL    Bun/Cre Ratio 19 9 - 20    Calcium 9.2 8.6 - 10.4 mg/dL    Sodium 137 135 - 144 mmol/L    Potassium 3.7 3.7 - 5.3 mmol/L    Chloride 98 98 - 107 mmol/L    CO2 29 20 - 31 mmol/L    Anion Gap 10 9 - 17 mmol/L    Alkaline Phosphatase 152 (H) 40 - 129 U/L    ALT 7 5 - 41 U/L    AST 21 <40 U/L    Total Bilirubin 0.98 0.3 - 1.2 mg/dL    Total Protein 7.1 6.4 - 8.3 g/dL    Albumin 3.9 3.5 - 5.2 g/dL    Albumin/Globulin Ratio NOT REPORTED 1.0 - 2.5    GFR Non-African American >60 >60 mL/min    GFR African American >60 >60 mL/min    GFR Comment          GFR Staging NOT REPORTED    Troponin    Collection Time: 04/11/21 11:56 PM   Result Value Ref Range    Troponin, High Sensitivity 52 (HH) 0 - 22 ng/L    Troponin T NOT REPORTED <0.03 ng/mL    Troponin Interp NOT REPORTED    Lactate, Sepsis    Collection Time: 04/11/21 11:56 PM   Result Value Ref Range    Lactic Acid, Sepsis 1.3 0.5 - 1.9 mmol/L    Lactic Acid, Sepsis, Whole Blood NOT REPORTED 0.5 - 1.9 mmol/L   EKG 12 Lead    Collection Time: 04/12/21 12:29 AM   Result Value Ref Range    Ventricular Rate 70 BPM    Atrial Rate 288 BPM    QRS Duration 180 ms    Q-T Interval 508 ms    QTc Calculation (Bazett) 548 ms    R Axis -105 degrees    T Axis 52 degrees   Lactate, Sepsis    Collection Time: 04/12/21  2:19 AM   Result Value Ref Range    Lactic Acid, Sepsis 1.3 0.5 - 1.9 mmol/L    Lactic Acid, Sepsis, Whole Blood NOT REPORTED 0.5 - 1.9 mmol/L   Troponin    Collection Time: 04/12/21  2:19 AM   Result Value Ref Range    Troponin, High Sensitivity 53 (HH) 0 - 22 ng/L    Troponin T NOT REPORTED <0.03 ng/mL    Troponin Interp NOT REPORTED        Imaging/Diagnostics:  Xr Chest (2 Vw)    Result Date: 4/10/2021  No pneumothorax. Stable cardiomegaly with indwelling pacemaker. Elevated right hemidiaphragm with opacity at the right base favoring atelectasis.      Ct Head Wo Contrast    Result Date: 4/7/2021  No acute intracranial abnormality. Microangiopathic change. Ct Chest W Contrast    Result Date: 4/12/2021  1. Status post interval left chest wall transvenous cardiac device placement. Soft tissue density superficial to the cardiac device is presumably postoperative hematoma. Additionally, there is overlying skin thickening subcu induration which may represent edema or cellulitis. No discrete peripherally enhancing fluid collection to suggest abscess. 2. Cardiomegaly. Coronary artery disease. 3. Cholelithiasis. Xr Chest Portable    Result Date: 4/12/2021  No acute process by radiograph. Xr Chest Portable    Result Date: 4/9/2021  No evidence of pneumothorax post pacemaker placement. Xr Chest Portable    Result Date: 4/7/2021  No acute cardiopulmonary disease Stable cardiomegaly       Assessment :      Hospital Problems           Last Modified POA    * (Principal) At risk for complication at site of pacemaker 4/12/2021 Yes    Chronic atrial fibrillation ([address] 4/12/2021 Yes    Gastroesophageal reflux disease without esophagitis 4/12/2021 Yes    Essential hypertension 4/12/2021 Yes    MARIAMA on CPAP 4/12/2021 Yes    COPD (chronic obstructive pulmonary disease) ([address] 4/12/2021 Yes    Chronic diastolic heart failure ([address] 4/12/2021 Yes        Plan:     Patient status observation in the  Med/Surge unit. 1. Consult cardiology- Dr. Ella Garibay. 2. Chest wall swelling and ecchymosis likely secondary to hematoma. 3. IV vancomycin. 4. Check procalcitonin. 5. Afib- continue xaerelto. 6. GERD- protonix. 7. MARIAMA/COPD- utilize home cpap, supplemental oxygen as needed, continue breo ellipta. 8. CHF- continue bumex. 9. Hypertension- continue metoprolol. 10. Monitor vital signs. 11. Follow chemistries. 12. Replete electrolytes as needed. 13. Trend troponin. 14. Telemetry. 15. Cardiac diet.   16. Activity as tolerated with assist.    Plan of care discussed

## 2021-04-12 NOTE — FLOWSHEET NOTE
Dr. Melia Stiles arrived to the patient's bedside for evaluation and was updated on the patient's current status via RN. Xarelto discontinued at this time. New orders received for bumex 2 mg I.V. once and augmentin (see order for details). Suspected hematoma at left upper chest surgical site. Will continue to monitor closely.

## 2021-04-12 NOTE — CARE COORDINATION
Case Management Initial Discharge Plan  Chyna Phillip,             Met with:patient to discuss discharge plans. Information verified: address, contacts, phone number, , insurance Yes    Emergency Contact/Next of Kin name & number: Spouse/Katelin  [phone]    PCP: Dandre Mercado PA-C  Date of last visit: 3/22    Insurance Provider: [facility] medicare    Discharge Planning    Living Arrangements:  Spouse/Significant Other, Children, Other (Comment)(3 adult daughters live in the home)   Support Systems:  Spouse/Significant Other, Children, Family Members    Home has 2 stories  5 stairs to climb to get into front door, stairs to climb to reach second floor  Location of bedroom/bathroom in home main    Patient able to perform ADL's:Independent    Current Services (outpatient & in home) none  DME equipment: w/c, cane, walker, rollator, CPAP, Hospital bed  DME provider: [address]    Receiving oral anticoagulation therapy? No    If indicated:   Physician managing anticoagulation treatment:   Where does patient obtain lab work for ATC treatment? Potential Assistance Needed:  N/A    Patient agreeable to home care: No  [place] of choice provided:  n/a    Prior SNF/Rehab Placement and Facility: [address] [place] [address] to SNF/Rehab: No  [place] of choice provided: n/a     Evaluation: no    Expected Discharge date:  21    Patient expects to be discharged to:  private residence  Follow Up Appointment: Best Day/ Time: Tuesday PM    Transportation provider: family  Transportation arrangements needed for discharge: No    Readmission Risk              Risk of Unplanned Readmission:        0             Does patient have a readmission risk score greater than 14?: No  If yes, follow-up appointment must be made within 7 days of discharge. Goals of Care:       Discharge Plan: DG:At risk for complication (pacemaker)  Patient lives in a 2 story home with spouse. 5 steps to enter.   Patient has all DME at home including CPAP, hosp bed, rollator,grab bars, elevated toilet seat  Pharmacy is [facility] pharmacy  Patient is declining [facility]. Cardiology consulted. Continue to follow.             Electronically signed by Juliana Ospina RN on 4/12/21 at 1:01 PM EDT

## 2021-04-12 NOTE — FLOWSHEET NOTE
RN contacted patient's spouse Vlad via telephone and updated her on the patient's current status. RN informed patient's spouse of patient's observation status and requested patient's home medications be brought in. Patient's spouse informed RN the patient's medications are in pill packs for everyday of the week and that they have had complications in the past.   RN contacted inpatient pharmacy and confirmed plan to have patient's spouse bring in armodafinil from home; as we do not carry that medication, and plan for patient to take our medications at this time. Patient provided with pharmacy manager's business card and instructed to call with billing concerns. Patient and patient's spouse verbalize understanding.

## 2021-04-12 NOTE — CONSULTS
Cardiovascular Consult Note     TODAY'S DATE: 4/12/2021    Patient name: Carmelo Herrera   YOB: 1945  Date of admission:  4/11/2021       Patient seen, examined. Previous clinical entries reviewed. All available laboratory, imaging and ancillary data reviewed. Reason for Consult:  Pacer site swelling. History of present Illness:     Carmelo Herrera is a 77 y.o. male with past medical history significant for chronic atrial fibrillation with sick sinus syndrome who underwent dual-chamber pacemaker placement on 4/9/2021 and was discharged on 4/10/2021 with a clean incisional site and normal device checks. Patient started noticing increasing redness around the site the following day. Of note, the patient was on Xarelto 20 mg prior to this pacemaker placement. Xarelto was restarted on for 4/10/2021. Due to the concern of swelling around the pacemaker site, the patient presented to the emergency room. He was found to have some hematoma around the pacer site and was admitted for further care. Patient denies any fever or chills. No evidence of increased blood count on regular blood work in the emergency room. He does have some increased swelling in his legs than his baseline.       Past Medical History:    has a past medical history of Acute superficial gastritis without hemorrhage, Anastomotic stricture of stomach, Asthma, Atrial fibrillation ([address] Calculus of gallbladder without cholecystitis without obstruction, Chronic diastolic heart failure (HCC), Chronic rhinosinusitis, Class 2 severe obesity due to excess calories with serious comorbidity and body mass index (BMI) of 36.0 to 36.9 in [facility]), COPD (chronic obstructive pulmonary disease) ([facility] [address] E. coli septicemia ([facility] E. coli UTI, Foot drop, right, Fracture of femur ([address] Gait disorder, Gastric out let obstruction, GERD (gastroesophageal reflux disease), Hallux valgus, acquired, bilateral, Hyperlipidemia, Hypertension, Impaired hearing, Internal hemorrhoids, Lymphedema of both lower extremities, Nausea & vomiting, OA (osteoarthritis) of knee, bilateral, Obesity, MARIAMA on CPAP, Osteoarthritis, Osteoarthritis of lumbar spine, S/P revision of total knee, Septicemia due to E. coli (HCC), Sick sinus syndrome (HCC), Simple chronic bronchitis (HCC), Slow transit constipation, Unspecified sleep apnea, and UTI (urinary tract infection).     Surgical History:     Past Surgical History:   Procedure Laterality Date    CARPAL TUNNEL RELEASE      bilateral    COLONOSCOPY      COLONOSCOPY N/A 04/05/2021    COLONOSCOPY DIAGNOSTIC performed by Tramaine Roberts MD at [place] [address]  01/02/2019    by Dr. Nick Florentino N/A 01/02/2019    CYSTOSCOPY performed by Pebbles Davila MD at [address]    first knuckle right index finger   [facility]    vertical banded gastroplasty   [address] REPLACEMENT  2004 & 2005    bilateral knees    PACEMAKER INSERTION  04/09/2021    [place], placed by Dr. Suri Denton EGD [address] [facility] [address] N/A 08/16/2018    EGD FOREIGN BODY REMOVAL performed by Matthew Graves MD at [address] [facility] EGD TRANSORAL BIOPSY SINGLE/MULTIPLE N/A 05/25/2018    EGD BIOPSY performed by Melody Rosa DO at [address] [facility]      left shoulder    UPPER GASTROINTESTINAL ENDOSCOPY  08/13/2018    removal of food bolus    UPPER GASTROINTESTINAL ENDOSCOPY N/A 08/13/2018    EGD FOREIGN BODY REMOVAL performed by Melody Rosa DO at [facility] N/A 08/13/2018    EGD BIOPSY performed by Melody Rosa DO at [facility]  08/16/2018    egd with balloon dilitation    UPPER GASTROINTESTINAL ENDOSCOPY  08/16/2018    EGD DILATION BALLOON performed by Matthew Graves MD at [facility] 10/01/2018    EGD BIOPSY performed by Fer Valdez Eliseo Guillen MD at [facility]    UPPER GASTROINTESTINAL ENDOSCOPY  10/01/2018    EGD DILATION BALLOON performed by Farrah Leyva MD at [facility]    UPPER GASTROINTESTINAL ENDOSCOPY N/A 11/19/2018    EGD DILATION BALLOON performed by Farrah Leyva MD at [facility] N/A 10/15/2020    EGD SUBMUCOSAL/BOTOX INJECTION tattoo  performed by Janell Mike MD at [facility]       Medications:   Scheduled Meds:   Fluticasone furoate-vilanterol  1 puff Inhalation Daily    ferrous sulfate  325 mg Oral BID WC    ascorbic acid  500 mg Oral Daily    fluticasone  2 spray Nasal Daily    cetirizine  1 tablet Oral Daily    potassium chloride  20 mEq Oral BID    Multi-Vitamins  1 tablet Oral Daily    carboxymethylcellulose  1 drop Ophthalmic 4x Daily    pantoprazole  1 tablet Oral Daily    rOPINIRole  1 tablet Oral BID    metoprolol succinate  50 mg Oral Nightly    tamsulosin  0.4 mg Oral Daily    bumetanide  1 mg Oral Daily    Armodafinil  1 tablet Mouth/Throat BID    lipase-protease-amylase  24,000 Units Oral TID WC    gabapentin  300 mg Oral TID    sodium chloride flush  5-40 mL Intravenous 2 times per day    vancomycin  15 mg/kg Intravenous Q12H    vancomycin (VANCOCIN) intermittent dosing (placeholder)   Other RX Placeholder     Continuous Infusions:   sodium chloride        No outpatient medications have been marked as taking for the 4/11/21 encounter [facility] Encounter). Allergies:   Darvocet [propoxyphene n-acetaminophen], Other, Oxycodone-acetaminophen, and Propoxyphene    Social History:    reports that he quit smoking about 44 years ago. He has a 20.00 pack-year smoking history. He has never used smokeless tobacco. He reports that he does not drink alcohol or use drugs. Family History:    family history includes Cancer in his mother; Heart Attack in his father. Review of Systems:     Constitutional: No fever/chills.  HENT: No headache, neck pain or neck stiffnes. No sore throat or dysphagia. Eyes: No blurred vision. Respiratory: As above. Cardiovascular: As above. Gastrointestinal: Negative. Genitourinary: Negative  Endocrine: Negative. Musculoskeletal: Negative. Skin: Negative. Allergic/Immunologic: Negative. Neurologic: Negative. Hematological: Negative. Psychiatric: Negative. All other systems are are noted to be otherwise negative. Physical Exam:   /63   Pulse 70   Temp 98.3 °F (36.8 °C) (Oral)   Resp 14   Ht 5' 10\" (1.778 m)   Wt 207 lb (93.9 kg)   SpO2 98%   BMI 29.70 kg/m²     Intake/Output Summary (Last 24 hours) at 4/12/2021 0902  Last data filed at 4/12/2021 0850  Gross per 24 hour   Intake 520 ml   Output 450 ml   Net 70 ml       GENERAL:  Alert, appropriate, oriented, in NAD. HEENT:  Head is atraumatic and normocephalic. No Pallor. No icterus. NECK: Supple without any thyromegaly. LUNGS: Generally decreased breath sounds. CARDIAC: S1, S2, irregular rhythm. Pacemaker site has a significant bulge noted in the anterior segment about the pacer, which is worse than his baseline. ABD:  Soft non-tender . EXT: Positive for edema. Tonic venous stasis. MS: No obvious deformities. SKIN: No obvious skin rashes. NEURO: No focal neurologic deficits. Labs/ Ancillary data:     CBC:   Recent Labs     04/10/21  0454 04/11/21 2356   WBC 4.0 5.3   HGB 10.0* 11.7*   * 167     BMP:    Recent Labs     04/09/21  1502 04/10/21  0454 04/11/21  2356    136 137   K 3.4* 4.3 3.7    100 98   CO2 27 30 29   BUN 17 15 15   CREATININE 0.72 0.77 0.79   GLUCOSE 79 108* 108*     Hepatic:   Recent Labs     04/11/21  2356   AST 21   ALT 7   BILITOT 0.98   ALKPHOS 152*     Troponin:   Recent Labs     04/12/21  0815   TROPONINT NOT REPORTED       Imaging:    Chest x-ray and CT scan of the chest reviewed. The consistency of fluid around the pacemaker has more in line with hematoma.     Impression :     Pacer site hematoma no clear evidence of any infection. Clinically appears to be a supra muscular small bleed that was exacerbated by use of anticoagulant. Atrial fibrillation-chronic with sick sinus syndrome status post recent pacemaker placement. Need for anticoagulation chronically. Chronic diastolic heart failure. Plan :     Would hold Xarelto for at least 1 week. Can be restarted as an outpatient after a wound check. We will discontinue doxycycline and start patient on Augmentin twice daily for prevention of pacemaker site infection. We will give patient an additional dose of IV Bumex today while in the hospital.    Thank you very much for allowing us to participate in the care of this patient. Please call us with any questions.     Electronically signed by Camillia Hodgkin, MD on 4/12/2021 at 9:03 AM

## 2021-04-12 NOTE — FLOWSHEET NOTE
Patient in bed watching TV; requests a devotional; requests prayer for his \"wayward sons\" and his daughters' medical needs; requests prayer for his wife. Patient appears to be coping normally. Writer provides listening presence, supportive conversation, prayer, and [employer] devotional. Patient expresses gratitude for  services. Spiritual Care will follow as needed. 04/12/21 1356   Encounter Summary   Services provided to: Patient   Referral/Consult From: [facility]   Support System Spouse; Children;Temple/tahir community   Place of [address] [place] [address],[address] Completed   Continue Visiting   (4/12/21)   Complexity of Encounter Moderate   Length of Encounter 15 minutes   Spiritual Assessment Completed Yes   Routine   Type Initial   Spiritual/Sikhism   Type Spiritual support   Assessment Calm; Approachable   Intervention Active listening;Explored feelings, thoughts, concerns;Prayer;Provided reading materials/devotional materials   Outcome Expressed gratitude;Engaged in conversation;Expressed feelings/needs/concerns

## 2021-04-12 NOTE — ED PROVIDER NOTES
EMERGENCY DEPARTMENT ENCOUNTER    Pt Name: Giselle Wong  MRN: [id number]  [place] 1945  Date of evaluation: 4/12/21  CHIEF COMPLAINT       Chief Complaint   Patient presents with    Other     swelling around pacemaker today- Arash Cárdenas advised come to ED     HISTORY OF PRESENT ILLNESS   Patient is a 30-year-old male that is post pacemaker placement 2 days ago by Dr. Olga Ayers who presents the ED for evaluation of tenderness and swelling around pacemaker site. Patient called Dr. Arash Cárdenas today who advised to come to emergency room for evaluation. Pain is mild. No fevers. No other issues, no chest pain, shortness breath, cough, abdominal pain. REVIEW OF SYSTEMS     Review of Systems   All other systems reviewed and are negative.     PASTMEDICAL HISTORY     Past Medical History:   Diagnosis Date    Acute superficial gastritis without hemorrhage 5/26/2018    Anastomotic stricture of stomach     Asthma     Atrial fibrillation ([address]     On Xarelto 6/27/2020    Calculus of gallbladder without cholecystitis without obstruction 5/2/2017    Chronic diastolic heart failure ([address] 11/17/2017    Chronic rhinosinusitis 4/13/2015    Class 2 severe obesity due to excess calories with serious comorbidity and body mass index (BMI) of 36.0 to 36.9 in adult [facility]) 11/17/2017    COPD (chronic obstructive pulmonary disease) ([facility]     [facility]. [facility] septicemia ([facility])     E. coli UTI     Foot drop, right 7/10/2012    Fracture of femur ([facility] 10/23/2016    Gait disorder 7/20/2016    Gastric out let obstruction     GERD (gastroesophageal reflux disease)     Hallux valgus, acquired, bilateral 6/24/2015    Hyperlipidemia     Hypertension     Impaired hearing 4/13/2015    Internal hemorrhoids 1/3/2017    Lymphedema of both lower extremities 6/24/2015    Nausea & vomiting 11/16/2018    OA (osteoarthritis) of knee, bilateral 12/11/2006    Obesity     MARIAMA on CPAP 5/2/2017    Osteoarthritis     Osteoarthritis of lumbar spine 12/13/2007     replace inactive diagnosis    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 coli ([facility] [address]     Due to UTI     Simple chronic bronchitis ([facility] [address] 4/10/2018    Slow transit constipation 11/3/2016    Unspecified sleep apnea     UTI (urinary tract infection)      SURGICAL HISTORY       Past Surgical History:   Procedure Laterality Date    CARPAL TUNNEL RELEASE      bilateral    COLONOSCOPY      COLONOSCOPY N/A 4/5/2021    COLONOSCOPY DIAGNOSTIC performed by Camilo Dee MD at [address] [facility]  01/02/2019    by Dr. Romain Mathews N/A 1/2/2019    CYSTOSCOPY performed by Norma Abbott MD at [address]    first knuckle right index finger   [address] [facility]    vertical banded gastroplasty   [address] REPL[address]EMENT  2004 & 2005    bilateral knees    [place] EGD [address] [facility] [address] N/A 8/16/2018    EGD FOREIGN BODY REMOVAL performed by Toby Chawla MD at [address] [facility] EGD TRANSORAL BIOPSY SINGLE/MULTIPLE N/A 5/25/2018    EGD BIOPSY performed by Em Thomas DO at [address] [facility]      left shoulder    UPPER GASTROINTESTINAL ENDOSCOPY  08/13/2018    removal of food bolus    UPPER GASTROINTESTINAL ENDOSCOPY N/A 8/13/2018    EGD FOREIGN BODY REMOVAL performed by Em Thomas DO at [address] [place] [address] 8/13/2018    EGD BIOPSY performed by Em Thomas DO at [address] [place] [address]  08/16/2018    egd with balloon dilitation    UPPER GASTROINTESTINAL ENDOSCOPY  8/16/2018    EGD DILATION BALLOON performed by Toby Chawla MD at [address] [place] [address] 10/1/2018    EGD BIOPSY performed by Camilo Dee MD at [address] [place] [address]  10/1/2018    EGD DILATION BALLOON performed by Camilo Dee MD at [address] [place] [address] 11/19/2018    EGD DILATION BALLOON performed by Rylie Rosario MD at [address] [facility] N/A 10/15/2020    EGD SUBMUCOSAL/BOTOX INJECTION tattoo  performed by Jacobo Timmons MD at [facility]       Previous Medications    ARMODAFINIL 200 MG TABS    Take 1 tablet by mouth 2 times daily. ASCORBIC ACID (VITAMIN C) 500 MG TABLET    Take 1 tablet by mouth daily    BENZONATATE (TESSALON) 100 MG CAPSULE    TAKE 1 CAPSULE BY MOUTH THREE TIMES DAILY AS NEEDED FOR COUGH    BUMETANIDE (BUMEX) 1 MG TABLET    Take 2 mg of Bumex in morning, 1 mg of Bumex in afternoon, take 1 mg in evening if weight is >3 above baseline weight    CARBOXYMETHYLCELLULOSE (REFRESH PLUS) 0.5 % SOLN OPHTHALMIC SOLUTION    Apply 1 drop to eye 4 times daily    CETIRIZINE (ZYRTEC) 10 MG TABLET    TAKE 1 TABLET BY MOUTH DAILY    CREON 37819-91707 UNITS DELAYED RELEASE CAPSULE    TAKE 1 CAPSULE BY MOUTH THREE TIMES DAILY WITH MEALS    FERROUS SULFATE (FE TABS 325) 325 (65 FE) MG EC TABLET    Take 1 tablet by mouth 2 times daily (with meals)    FLUTICASONE (FLONASE) 50 MCG/ACT NASAL SPRAY    2 sprays by Nasal route daily    FLUTICASONE FUROATE-VILANTEROL (BREO ELLIPTA) 200-25 MCG/INH AEPB INHALER    Inhale 1 puff into the lungs daily    FOOT CARE PRODUCTS (TRI-BALANCE ORTHOTICS MENS) MISC    Provide insurance covered orthotics shoes, wear daily    GABAPENTIN (NEURONTIN) 300 MG CAPSULE    TAKE 1 CAPSULE BY MOUTH THREE TIMES DAILY    HANDICAP PLACARD MISC    by Does not apply route    INCONTINENCE SUPPLY DISPOSABLE (INCONTINENCE BRIEF LARGE) MISC    To use as directed.  Use 3x a day    IPRATROPIUM-ALBUTEROL (DUONEB) 0.5-2.5 (3) MG/3ML SOLN NEBULIZER SOLUTION    Inhale 1 vial into the lungs every 4 hours as needed     MENTHOL-METHYL SALICYLATE (THERA-GESIC) 0.5-15 % CREA    Apply topically 2 times daily as needed    METOPROLOL SUCCINATE (TOPROL XL) 25 MG EXTENDED RELEASE TABLET    TAKE ONE TABLET BY MOUTH TWICE DAILY    MULTIPLE VITAMIN (MULTI-VITAMINS) TABS    Take 1 tablet by mouth daily    PANTOPRAZOLE (PROTONIX) 40 MG TABLET    TAKE 1 TABLET BY MOUTH DAILY    POTASSIUM CHLORIDE (KLOR-CON M) 20 MEQ EXTENDED RELEASE TABLET    Take 1 tablet by mouth 2 times daily    ROPINIROLE (REQUIP) 0.25 MG TABLET    TAKE 1 TABLET BY MOUTH TWICE DAILY    SM LUBRICANT EYE DROPS 0.4-0.3 % OPHTHALMIC SOLUTION    PLACE 1 DROP INTO BOTH EYES FOUR TIMES DAILY AS NEEDED FOR DRY EYES    TAMSULOSIN (FLOMAX) 0.4 MG CAPSULE    Take 1 capsule by mouth daily    TIZANIDINE (ZANAFLEX) 4 MG TABLET    TAKE 1 TABLET BY MOUTH DAILY AS NEEDED    VITAMIN B-12 (CYANOCOBALAMIN) 100 MCG TABLET    TAKE 1 TABLET BY MOUTH DAILY AS NEEDED FOR FATIGUE    XARELTO 20 MG TABS TABLET    TAKE 1 TABLET BY MOUTH DAILY     ALLERGIES     is allergic to darvocet [propoxyphene n-acetaminophen]; other; oxycodone-acetaminophen; and propoxyphene. FAMILY HISTORY     He indicated that his mother is . He indicated that his father is . SOCIAL HISTORY       Social History     Tobacco Use    Smoking status: Former Smoker     Packs/day: 1.00     Years: 20.00     Pack years: 20.00     Quit date: 1976     Years since quittin.3    Smokeless tobacco: Never Used   Substance Use Topics    Alcohol use: No    Drug use: No     PHYSICAL EXAM     INITIAL VITALS: BP (!) 112/90   Pulse 74   Temp 97.9 °F (36.6 °C) (Oral)   Resp 15   Ht 5' 10.5\" (1.791 m)   Wt 212 lb (96.2 kg)   SpO2 100%   BMI 29.99 kg/m²    Physical Exam  Constitutional:       Comments: Elderly gentleman sitting up in exam bed appearing well, in no acute distress. Wife at bedside. HENT:      Head: Normocephalic. Right Ear: External ear normal.      Left Ear: External ear normal.      Nose: Nose normal.   Eyes:      Conjunctiva/sclera: Conjunctivae normal.   Cardiovascular:      Rate and Rhythm: Normal rate.    Pulmonary:      Effort: Pulmonary effort is normal. Abdominal:      General: Abdomen is flat. Skin:     General: Skin is dry. Comments: Elevation of skin from recent pacemaker placement with discoloration that tracks down left breast wall. Mild tenderness with palpation. Neurological:      Mental Status: He is alert. Mental status is at baseline. Psychiatric:         Mood and Affect: Mood normal.         Behavior: Behavior normal.      Comments:     Limited exam secondary to Covid-19 pandemic. MEDICAL DECISION MAKING:   The patient is hemodynamically stable, afebrile, nontoxic-appearing. Physical exam notable for the skin over pacemaker site, with mild tenderness and discoloration of skin. Based on history and exam appears more like hematoma rather than abscess/cellulitis. ED plan for basic labs, CT chest, reassess. DIAGNOSTIC RESULTS   EKG:All EKG's are interpreted by the Emergency Department Physician who either signs or Co-signs this chart in the absence of a cardiologist.        RADIOLOGY:All plain film, CT, MRI, and formal ultrasound images (except ED bedside ultrasound) are read by the radiologist, see reports below, unless otherwisenoted in MDM or here. CT CHEST W CONTRAST   Preliminary Result   1. Status post interval left chest wall transvenous cardiac device placement. Soft tissue density superficial to the cardiac device is presumably   postoperative hematoma. Additionally, there is overlying skin thickening   subcu induration which may represent edema or cellulitis. No discrete   peripherally enhancing fluid collection to suggest abscess. 2. Cardiomegaly. Coronary artery disease. 3. Cholelithiasis. XR CHEST PORTABLE   Final Result   No acute process by radiograph. LABS: All lab results were reviewed by myself, and all abnormals are listed below.   Labs Reviewed   CBC WITH AUTO DIFFERENTIAL - Abnormal; Notable for the following components:       Result Value    RBC 3.70 (*)     Hemoglobin 11.7 (*) VANCOMYCIN  IP CONSULT TO CARDIOLOGY    FINAL IMPRESSION      1. Cellulitis of chest wall          DISPOSITION/PLAN   DISPOSITION Admitted 04/12/2021 02:26:16 AM      PATIENT REFERRED TO:  No follow-up provider specified.   DISCHARGE MEDICATIONS:  New Prescriptions    No medications on file     Jeff Kiser MD  Attending Emergency Physician                    Cary Santo MD  04/12/21 [id number]

## 2021-04-12 NOTE — FLOWSHEET NOTE
Dr. Burch arrived to the patient's bedside for evaluation and was updated on the patient's current status via RN. Plan to discontinue vancomycin and start patient on oral antibiotic. Will continue to monitor closely.

## 2021-04-12 NOTE — CONSULTS
Pharmacy Note  Vancomycin Consult - Initial Note     Hermelinda Zaldivar is a 77 y.o. male ordered Vancomycin. .  Current diagnosis for which MRSA is suspected/confirmed: cellulitis of chest wall  Vancomycin order/consult received from Dr. Gavin Snow . Additional antimicrobials:  none    Patient Active Problem List   Diagnosis    Atrial fibrillation (HCC)    Hyperlipidemia    GERD (gastroesophageal reflux disease)    Osteoarthritis of lumbar spine    OA (osteoarthritis) of knee, bilateral    Hallux valgus, acquired, bilateral    Hypertension    Slow transit constipation    MARIAMA on CPAP    Class 2 severe obesity due to excess calories with serious comorbidity and body mass index (BMI) of 36.0 to 36.9 in adult [facility])    Simple chronic bronchitis (HCC)    Hospital-acquired bacterial pneumonia    Fluid overload    COPD (chronic obstructive pulmonary disease) ([facility])    Chronic diastolic heart failure (HCC)    Status post gastroplasty    Benign prostatic hyperplasia with urinary hesitancy    Myalgia    Atrial fibrillation with slow ventricular response ([address]    At risk for complication at site of pacemaker       Height:     Wt Readings from Last 1 Encounters:   04/11/21 212 lb (96.2 kg)     Allergies:  Darvocet [propoxyphene n-acetaminophen], Other, Oxycodone-acetaminophen, and Propoxyphene       Procalcitonin   Date Value Ref Range Status   10/11/2020 0.12 (H) <0.09 ng/mL Final     Comment:           Suspected Sepsis:  <0.50 ng/mL     Low likelihood of sepsis. 0.50-2.00 ng/mL     Increased likelihood of sepsis. Antibiotics encouraged. >2.00 ng/mL     High risk of sepsis/shock. Antibiotics strongly encouraged. Suspected Lower Resp Tract Infections:  <0.24 ng/mL     Low likelihood of bacterial infection. >0.24 ng/mL     Increased likelihood of bacterial infection. Antibiotics encouraged. With successful antibiotic therapy, PCT levels should decrease rapidly.  (Half-life of 24 to   36 hours.)        Procalcitonin values from samples collected within the first 6 hours of systemic infection   may still be low. Retesting may be indicated. Values from day 1 and day 4 can be entered into the Change in Procalcitonin Calculator   ([email or web address]. com) to determine the patient's Mortality Risk Prognosis        In healthy neonates, plasma Procalcitonin (PCT) concentrations increase gradually after   birth, reaching peak values at about 24 hours of age then decrease to normal values below   0.5 ng/mL by 48-72 hours of age. Recent Labs     04/10/21  0454 04/11/21  2356   BUN 15 15     Recent Labs     04/10/21  0454 04/11/21  2356   CREATININE 0.77 0.79     Recent Labs     04/10/21  0454 04/11/21  2356   WBC 4.0 5.3     No intake or output data in the 24 hours ending 04/12/21 0257    Temp: 97.9 F    Culture Date / Source  /  Results  Blood x 2 sent to lab    Actual Weight:    Wt Readings from Last 1 Encounters:   04/11/21 212 lb (96.2 kg)     CrCl based on IBW\"  86 ml/min        PLAN   Initial loading dose of 25mg/kg (max of 2500 mg) = 2500  mg   2. Vancomycin 1500 mg IV every 24 hours. 3.   Ensured BUN/sCr ordered at baseline and at least every 3rd day. 4.   ONLY for suspected pneumonia or COPD: MRSA nasal swab  is not ordered. Non respiratory infection. .    5. Trough ordered for: 4/13/21 @1400 . Trough Goals (Non-dialysis patient) Peaks are not routinely recommended. 10-20 mcg/mL for mild skin/soft tissue infections or UTI.  15-20 mcg/mL is the target trough for all other indications that MRSA infection is suspected. TROUGH TIMING: (Additional levels drawn based on renal function and/or clinical response). Dosing interval Timing of Trough   Every 8 hr, 12 hr, or 18 hr regimen Prior to the 4th dose  Twice weekly troughs for every 8 hour dosing   Every 24 hr regimen Prior to the 3rd or 4th dose   Every 36 hr regimen Prior to the 3rd dose           Thank you for the consult.   Pharmacy will continue to follow.   Fortino Bob RPh/Prakash  4/12/2021  2:57 AM

## 2021-04-12 NOTE — FLOWSHEET NOTE
Patient arrived to room 2035 via bed from ED. Patient is alert and oriented and reports pain as a \"four\" on the 0-10 pain scale located at the pacemaker insertion site left upper chest. Bed locked and placed in lowest position. Side rails up x2. Call light placed at the patient's bedside. Telephone report completed prior to patient's arrival. Left upper chest site assessed. Redness and ecchymosis present. Site remains BRIA. Pulses palpable bilaterally. Vital signs obtained (see flowsheet). RN educated the patient on plan of care. Patient verbalizes understanding. Will continue to monitor closely.

## 2021-04-13 LAB
ABSOLUTE EOS #: 0.29 K/UL (ref 0–0.44)
ABSOLUTE IMMATURE GRANULOCYTE: 0.01 K/UL (ref 0–0.3)
ABSOLUTE LYMPH #: 0.74 K/UL (ref 1.1–3.7)
ABSOLUTE MONO #: 0.58 K/UL (ref 0.1–1.2)
ANION GAP SERPL CALCULATED.3IONS-SCNC: 9 MMOL/L (ref 9–17)
BASOPHILS # BLD: 1 % (ref 0–2)
BASOPHILS ABSOLUTE: 0.04 K/UL (ref 0–0.2)
BUN BLDV-MCNC: 16 MG/DL (ref 8–23)
BUN/CREAT BLD: 18 (ref 9–20)
CALCIUM SERPL-MCNC: 8.5 MG/DL (ref 8.6–10.4)
CHLORIDE BLD-SCNC: 100 MMOL/L (ref 98–107)
CO2: 30 MMOL/L (ref 20–31)
CREAT SERPL-MCNC: 0.91 MG/DL (ref 0.7–1.2)
DIFFERENTIAL TYPE: ABNORMAL
EKG ATRIAL RATE: 288 BPM
EKG Q-T INTERVAL: 508 MS
EKG QRS DURATION: 180 MS
EKG QTC CALCULATION (BAZETT): 548 MS
EKG R AXIS: -105 DEGREES
EKG T AXIS: 52 DEGREES
EKG VENTRICULAR RATE: 70 BPM
EOSINOPHILS RELATIVE PERCENT: 7 % (ref 1–4)
GFR AFRICAN AMERICAN: >60 ML/MIN
GFR NON-AFRICAN AMERICAN: >60 ML/MIN
GFR SERPL CREATININE-BSD FRML MDRD: ABNORMAL ML/MIN/{1.73_M2}
GFR SERPL CREATININE-BSD FRML MDRD: ABNORMAL ML/MIN/{1.73_M2}
GLUCOSE BLD-MCNC: 103 MG/DL (ref 70–99)
HCT VFR BLD CALC: 30.3 % (ref 40.7–50.3)
HCT VFR BLD CALC: 34.6 % (ref 40.7–50.3)
HEMOGLOBIN: 10.7 G/DL (ref 13–17)
HEMOGLOBIN: 9.5 G/DL (ref 13–17)
IMMATURE GRANULOCYTES: 0 %
LYMPHOCYTES # BLD: 18 % (ref 24–43)
MCH RBC QN AUTO: 31.5 PG (ref 25.2–33.5)
MCH RBC QN AUTO: 31.8 PG (ref 25.2–33.5)
MCHC RBC AUTO-ENTMCNC: 30.9 G/DL (ref 28.4–34.8)
MCHC RBC AUTO-ENTMCNC: 31.4 G/DL (ref 28.4–34.8)
MCV RBC AUTO: 101.3 FL (ref 82.6–102.9)
MCV RBC AUTO: 101.8 FL (ref 82.6–102.9)
MONOCYTES # BLD: 14 % (ref 3–12)
NRBC AUTOMATED: 0 PER 100 WBC
NRBC AUTOMATED: 0 PER 100 WBC
PDW BLD-RTO: 13.9 % (ref 11.8–14.4)
PDW BLD-RTO: 14 % (ref 11.8–14.4)
PLATELET # BLD: 124 K/UL (ref 138–453)
PLATELET # BLD: 129 K/UL (ref 138–453)
PLATELET ESTIMATE: ABNORMAL
PMV BLD AUTO: 8.6 FL (ref 8.1–13.5)
PMV BLD AUTO: 8.6 FL (ref 8.1–13.5)
POTASSIUM SERPL-SCNC: 3.7 MMOL/L (ref 3.7–5.3)
RBC # BLD: 2.99 M/UL (ref 4.21–5.77)
RBC # BLD: 3.4 M/UL (ref 4.21–5.77)
RBC # BLD: ABNORMAL 10*6/UL
SEG NEUTROPHILS: 60 % (ref 36–65)
SEGMENTED NEUTROPHILS ABSOLUTE COUNT: 2.51 K/UL (ref 1.5–8.1)
SODIUM BLD-SCNC: 139 MMOL/L (ref 135–144)
WBC # BLD: 4.2 K/UL (ref 3.5–11.3)
WBC # BLD: 4.3 K/UL (ref 3.5–11.3)
WBC # BLD: ABNORMAL 10*3/UL

## 2021-04-13 PROCEDURE — 6370000000 HC RX 637 (ALT 250 FOR IP): Performed by: INTERNAL MEDICINE

## 2021-04-13 PROCEDURE — 94640 AIRWAY INHALATION TREATMENT: CPT

## 2021-04-13 PROCEDURE — 6370000000 HC RX 637 (ALT 250 FOR IP): Performed by: NURSE PRACTITIONER

## 2021-04-13 PROCEDURE — 85027 COMPLETE CBC AUTOMATED: CPT

## 2021-04-13 PROCEDURE — G0378 HOSPITAL OBSERVATION PER HR: HCPCS

## 2021-04-13 PROCEDURE — 36415 COLL VENOUS BLD VENIPUNCTURE: CPT

## 2021-04-13 PROCEDURE — 99225 PR SBSQ OBSERVATION CARE/DAY 25 MINUTES: CPT | Performed by: INTERNAL MEDICINE

## 2021-04-13 PROCEDURE — 85025 COMPLETE CBC W/AUTO DIFF WBC: CPT

## 2021-04-13 PROCEDURE — 80048 BASIC METABOLIC PNL TOTAL CA: CPT

## 2021-04-13 PROCEDURE — 6360000002 HC RX W HCPCS: Performed by: INTERNAL MEDICINE

## 2021-04-13 PROCEDURE — 2580000003 HC RX 258: Performed by: NURSE PRACTITIONER

## 2021-04-13 RX ORDER — BUMETANIDE 1 MG/1
1 TABLET ORAL 2 TIMES DAILY
Status: DISCONTINUED | OUTPATIENT
Start: 2021-04-13 | End: 2021-04-14 | Stop reason: HOSPADM

## 2021-04-13 RX ORDER — AMOXICILLIN AND CLAVULANATE POTASSIUM 875; 125 MG/1; MG/1
1 TABLET, FILM COATED ORAL EVERY 12 HOURS SCHEDULED
Qty: 20 TABLET | Refills: 1 | Status: SHIPPED | OUTPATIENT
Start: 2021-04-13 | End: 2021-04-23

## 2021-04-13 RX ORDER — ALBUTEROL SULFATE 90 UG/1
2 AEROSOL, METERED RESPIRATORY (INHALATION) EVERY 6 HOURS PRN
COMMUNITY

## 2021-04-13 RX ORDER — METOCLOPRAMIDE HYDROCHLORIDE 5 MG/ML
10 INJECTION INTRAMUSCULAR; INTRAVENOUS ONCE
Status: COMPLETED | OUTPATIENT
Start: 2021-04-13 | End: 2021-04-13

## 2021-04-13 RX ADMIN — BUMETANIDE 1 MG: 1 TABLET ORAL at 16:52

## 2021-04-13 RX ADMIN — METOPROLOL SUCCINATE 50 MG: 50 TABLET, EXTENDED RELEASE ORAL at 20:45

## 2021-04-13 RX ADMIN — SODIUM CHLORIDE, PRESERVATIVE FREE 10 ML: 5 INJECTION INTRAVENOUS at 07:47

## 2021-04-13 RX ADMIN — PANCRELIPASE 24000 UNITS: 24000; 76000; 120000 CAPSULE, DELAYED RELEASE PELLETS ORAL at 07:54

## 2021-04-13 RX ADMIN — ROPINIROLE HYDROCHLORIDE 0.25 MG: 0.25 TABLET, FILM COATED ORAL at 20:45

## 2021-04-13 RX ADMIN — GABAPENTIN 300 MG: 300 CAPSULE ORAL at 20:45

## 2021-04-13 RX ADMIN — SODIUM CHLORIDE, PRESERVATIVE FREE 10 ML: 5 INJECTION INTRAVENOUS at 20:44

## 2021-04-13 RX ADMIN — AMOXICILLIN AND CLAVULANATE POTASSIUM 1 TABLET: 875; 125 TABLET, FILM COATED ORAL at 07:46

## 2021-04-13 RX ADMIN — POLYVINYL ALCOHOL 1 DROP: 14 SOLUTION/ DROPS OPHTHALMIC at 07:47

## 2021-04-13 RX ADMIN — POLYVINYL ALCOHOL 1 DROP: 14 SOLUTION/ DROPS OPHTHALMIC at 12:41

## 2021-04-13 RX ADMIN — PANCRELIPASE 24000 UNITS: 24000; 76000; 120000 CAPSULE, DELAYED RELEASE PELLETS ORAL at 16:51

## 2021-04-13 RX ADMIN — ROPINIROLE HYDROCHLORIDE 0.25 MG: 0.25 TABLET, FILM COATED ORAL at 07:54

## 2021-04-13 RX ADMIN — BUMETANIDE 1 MG: 1 TABLET ORAL at 07:46

## 2021-04-13 RX ADMIN — POTASSIUM CHLORIDE 20 MEQ: 1500 TABLET, EXTENDED RELEASE ORAL at 20:45

## 2021-04-13 RX ADMIN — GABAPENTIN 300 MG: 300 CAPSULE ORAL at 12:41

## 2021-04-13 RX ADMIN — AMOXICILLIN AND CLAVULANATE POTASSIUM 1 TABLET: 875; 125 TABLET, FILM COATED ORAL at 20:44

## 2021-04-13 RX ADMIN — PANCRELIPASE 24000 UNITS: 24000; 76000; 120000 CAPSULE, DELAYED RELEASE PELLETS ORAL at 12:41

## 2021-04-13 RX ADMIN — BUDESONIDE AND FORMOTEROL FUMARATE DIHYDRATE 2 PUFF: 160; 4.5 AEROSOL RESPIRATORY (INHALATION) at 19:52

## 2021-04-13 RX ADMIN — PANTOPRAZOLE SODIUM 40 MG: 40 TABLET, DELAYED RELEASE ORAL at 06:28

## 2021-04-13 RX ADMIN — GABAPENTIN 300 MG: 300 CAPSULE ORAL at 07:45

## 2021-04-13 RX ADMIN — TIZANIDINE 4 MG: 4 TABLET ORAL at 20:44

## 2021-04-13 RX ADMIN — TAMSULOSIN HYDROCHLORIDE 0.4 MG: 0.4 CAPSULE ORAL at 20:45

## 2021-04-13 RX ADMIN — METOCLOPRAMIDE 10 MG: 5 INJECTION, SOLUTION INTRAMUSCULAR; INTRAVENOUS at 16:52

## 2021-04-13 RX ADMIN — BUDESONIDE AND FORMOTEROL FUMARATE DIHYDRATE 2 PUFF: 160; 4.5 AEROSOL RESPIRATORY (INHALATION) at 11:50

## 2021-04-13 RX ADMIN — POTASSIUM CHLORIDE 20 MEQ: 1500 TABLET, EXTENDED RELEASE ORAL at 07:45

## 2021-04-13 RX ADMIN — POLYVINYL ALCOHOL 1 DROP: 14 SOLUTION/ DROPS OPHTHALMIC at 16:52

## 2021-04-13 ASSESSMENT — PAIN DESCRIPTION - LOCATION: LOCATION: CHEST

## 2021-04-13 ASSESSMENT — PAIN SCALES - GENERAL
PAINLEVEL_OUTOF10: 0

## 2021-04-13 ASSESSMENT — PAIN DESCRIPTION - ONSET: ONSET: ON-GOING

## 2021-04-13 ASSESSMENT — PAIN DESCRIPTION - DIRECTION: RADIATING_TOWARDS: NONRADIATING PER PATIENT

## 2021-04-13 ASSESSMENT — PAIN DESCRIPTION - ORIENTATION: ORIENTATION: LEFT;UPPER

## 2021-04-13 ASSESSMENT — PAIN DESCRIPTION - PROGRESSION: CLINICAL_PROGRESSION: GRADUALLY IMPROVING

## 2021-04-13 ASSESSMENT — PAIN DESCRIPTION - PAIN TYPE: TYPE: ACUTE PAIN;SURGICAL PAIN

## 2021-04-13 NOTE — PROGRESS NOTES
Cardiovascular progress Note          Patient name: Sherrill Serrano    YOB: 1945  Date of admission:  4/11/2021       Patient seen, examined. Previous clinical entries reviewed. All available laboratory, imaging and ancillary data reviewed. Subjective:      No new clear symptoms. BP running on the lower side. Urine output stable. Systems review:  Constitutional: No fever/chills. HENT: No headache, neck pain or neck stiffness. No sore throat or dysphagia. Gastrointestinal: No abdominal pain, nausea or vomiting. Cardiac: As Above  Respiratory: As above  Neurologic: No new focal weakness or numbness  Psychiatric: Normal mood and mentation       Examination:   Vitals: BP (!) 90/46   Pulse 69   Temp 97.2 °F (36.2 °C) (Temporal)   Resp 16   Ht 5' 10\" (1.778 m)   Wt 207 lb (93.9 kg)   SpO2 95%   BMI 29.70 kg/m²     Intake/Output Summary (Last 24 hours) at 4/13/2021 1202  Last data filed at 4/13/2021 1104  Gross per 24 hour   Intake 900 ml   Output 2525 ml   Net -1625 ml     General appearance: Comfortable in no apparent distress. HEENT: No pallor. No icterus  Neck: Supple. Lungs:Generally decreased breath sounds  Heart: S1,S2  Pacer site - more ecchymosis with mild enlargement. Abdomen: Soft  Extremities: No peripheral edema  Skin: No obvious rashes. Musculoskeletal: No obvious deformities. Neurologic: No focal deficits.      Labs/ Ancillary data:     CBC:   Recent Labs     04/11/21 2356 04/13/21 0457   WBC 5.3 4.3   HGB 11.7* 9.5*    124*     BMP:    Recent Labs     04/11/21 2356 04/13/21  0457    139   K 3.7 3.7   CL 98 100   CO2 29 30   BUN 15 16   CREATININE 0.79 0.91   GLUCOSE 108* 103*     Hepatic:   Recent Labs     04/11/21 2356   AST 21   ALT 7   BILITOT 0.98   ALKPHOS 152*     Troponin:   Recent Labs     04/12/21  0219 04/12/21  0815 04/12/21  1348   TROPONINT NOT REPORTED NOT REPORTED NOT REPORTED       Medications:   Scheduled Meds:   ferrous sulfate  325 mg Oral BID WC    ascorbic acid  500 mg Oral Daily    fluticasone  2 spray Nasal Daily    cetirizine  10 mg Oral Daily    potassium chloride  20 mEq Oral BID    multivitamin  1 tablet Oral Daily    polyvinyl alcohol  1 drop Ophthalmic 4x Daily    pantoprazole  40 mg Oral Daily    rOPINIRole  0.25 mg Oral BID    metoprolol succinate  50 mg Oral Nightly    tamsulosin  0.4 mg Oral Daily    Armodafinil  1 tablet Mouth/Throat BID    lipase-protease-amylase  24,000 Units Oral TID WC    gabapentin  300 mg Oral TID    sodium chloride flush  5-40 mL Intravenous 2 times per day    budesonide-formoterol  2 puff Inhalation BID    amoxicillin-clavulanate  1 tablet Oral 2 times per day    tiZANidine  4 mg Oral Nightly     Continuous Infusions:   sodium chloride         Assessment/ Plan :     Pacer site hematoma no clear evidence of any infection. Anemia - concerning for small on-going bleed  Atrial fibrillation-chronic with sick sinus syndrome status post recent pacemaker placement. Chronic diastolic heart failure. Will check a FU H/H. Might need a wound exploration and hematoma removal.  Hold off on discharge for now. Addendum:     Repeat Hgb back at 10.7 gm/dL. [id number] Arline Echevarria for discharge. Will monitor closely as an outpatient. Rx for Augmentin sent to patient's pharmacy of choice. Electronically signed by Neptali Evans MD on 4/13/2021 at 1:39 PM     Thank you very much for allowing us to participate in the care of this patient. Please call us with any questions.

## 2021-04-13 NOTE — FLOWSHEET NOTE
Dr. Andrés Henry returned call and was updated on the patient's most recent CBC results. Patient is okay for discharge from cardiology standpoint. Follow-up next week with Dr. Andrés Henry.

## 2021-04-13 NOTE — DISCHARGE SUMMARY
[facility]  Office: [address], DO, Kaden Godoy, DO, Louis Vela, DO, Yonis Burch, DO, Cassie Joseph MD, Kierra Esquivel MD, Dasha Frias MD, Krystle Alexis MD, Desirae Arce MD, Kyler Sterling MD, Mary Dorado MD, Kedar Locke MD, Giuliana Ortiz DO, Lara Edmonds MD, Jay Ge DO, Eugenia Rider MD,  Mindy Martinez, DO, Trevor Cortez MD, Butch Marquez MD, Coby Wahl MD, Jessie Niño MD, Jorge Luis Patricia, Luc Cabrera, CNP, Chip Allen, CNP, Dave Maldonado, CNS, Alfredo Osgood, CNP, Korin Rock, CNP, Jacqueline Epps, CNP, Bill Henson, CNP, Leslie Garcia, CNP, Idania Camejo PA-C, Leonela Arteaga, [facility], Delphine Rodríguez, CNP, Paulino Perkins, CNP, Saad Harrison, CNP, Omega Hill, CNP, Aparna Villanueva, [facility], Yulia Carpenter, [facility]    Discharge Summary     Patient ID: Ernesto Rodriguez  :  1945   MRN: [id number]     ACCOUNT:  [de-identified]   Patient's PCP: Saint Gain, PA-C  Admit Date: 2021   Discharge Date: 2021     Length of Stay: 0  Code Status:  Full Code  Admitting Physician: León Burch DO  Discharge Physician: Latoya Lam MD     Active Discharge Diagnoses:     Hospital Problem Lists:  Principal Problem:    Pacemaker pocket hematoma, initial encounter  Active Problems:    Chronic atrial fibrillation (HCC)    Gastroesophageal reflux disease without esophagitis    Essential hypertension    MARIAMA on CPAP    COPD (chronic obstructive pulmonary disease) ([facility]    Chronic diastolic heart failure ([facility]  Resolved Problems:    * No resolved hospital problems.  *      Admission Condition:  poor     Discharged Condition: stable    Hospital Stay:   Admitting history:  [facility] a 77 y.o. Non-/non  male who presents with Other (swelling around pacemaker today- Liv Deal advised come to ED)   and is admitted to the hospital for the management improved with Reglan, no vomiting today, ate breakfast.   Bruise around pacemaker site is unchanged  Hemoglobin last checked was 10.7  Cardiology per verbal information is okay with discharge   earlier had recommended to hold Xarelto for at least 1 week and can be restarted as an outpatient after wound check  Antibiotics were changed to Augmentin for prevention of pacemaker site infection  Plan:         1. Continue Augmentin till recommended by cardiology  2. Keep Xarelto on hold at least for 1 week till seen by cardiology in office  3.  Discharge home, cardiology is okay with discharge      Significant therapeutic interventions: See above notes    Significant Diagnostic Studies:   Labs / Micro:  CBC:   Lab Results   Component Value Date    WBC 4.2 04/13/2021    RBC 3.40 04/13/2021    RBC 4.39 09/13/2011    HGB 10.7 04/13/2021    HCT 34.6 04/13/2021    .8 04/13/2021    MCH 31.5 04/13/2021    MCHC 30.9 04/13/2021    RDW 13.9 04/13/2021     04/13/2021     09/13/2011     BMP:    Lab Results   Component Value Date    GLUCOSE 103 04/13/2021    GLUCOSE 99 09/13/2011     04/13/2021    K 3.7 04/13/2021     04/13/2021    CO2 30 04/13/2021    ANIONGAP 9 04/13/2021    BUN 16 04/13/2021    CREATININE 0.91 04/13/2021    BUNCRER 18 04/13/2021    CALCIUM 8.5 04/13/2021    LABGLOM >60 04/13/2021    GFRAA >60 04/13/2021    GFR      04/13/2021    GFR NOT REPORTED 04/13/2021     HFP:    Lab Results   Component Value Date    PROT 7.1 04/11/2021     CMP:    Lab Results   Component Value Date    GLUCOSE 103 04/13/2021    GLUCOSE 99 09/13/2011     04/13/2021    K 3.7 04/13/2021     04/13/2021    CO2 30 04/13/2021    BUN 16 04/13/2021    CREATININE 0.91 04/13/2021    ANIONGAP 9 04/13/2021    ALKPHOS 152 04/11/2021    ALT 7 04/11/2021    AST 21 04/11/2021    BILITOT 0.98 04/11/2021    LABALBU 3.9 04/11/2021    ALBUMIN NOT REPORTED 04/11/2021    LABGLOM >60 04/13/2021    GFRAA >60 04/13/2021    GFR 04/13/2021    GFR NOT REPORTED 04/13/2021    PROT 7.1 04/11/2021    CALCIUM 8.5 04/13/2021     PT/INR:    Lab Results   Component Value Date    PROTIME 17.7 04/09/2021    PROTIME 16.6 08/16/2018    INR 1.5 04/09/2021     PTT:   Lab Results   Component Value Date    APTT 28.0 10/11/2020     FLP:    Lab Results   Component Value Date    CHOL 157 07/06/2018    CHOL 129 10/30/2013    TRIG 101 07/06/2018    HDL 72 04/01/2021     U/A:    Lab Results   Component Value Date    COLORU Yellow 01/14/2021    COLORU YELLOW 01/04/2021    TURBIDITY CLEAR 01/04/2021    SPECGRAV 1.015 01/14/2021    SPECGRAV 1.013 01/04/2021    HGBUR SMALL 01/04/2021    PHUR 6.0 01/14/2021    PHUR 5.0 01/04/2021    PROTEINU negative 01/14/2021    PROTEINU NEGATIVE 01/04/2021    GLUCOSEU negative 01/14/2021    GLUCOSEU NEGATIVE 01/04/2021    KETUA negative 01/14/2021    KETUA NEGATIVE 01/04/2021    BILIRUBINUR negative 01/14/2021    UROBILINOGEN Normal 01/04/2021    NITRU NEGATIVE 01/04/2021    LEUKOCYTESUR negative 01/14/2021    LEUKOCYTESUR NEGATIVE 01/04/2021     TSH:    Lab Results   Component Value Date    TSH 1.77 04/07/2021        Radiology:  Xr Chest (2 Vw)    Result Date: 4/10/2021  No pneumothorax. Stable cardiomegaly with indwelling pacemaker. Elevated right hemidiaphragm with opacity at the right base favoring atelectasis. Ct Head Wo Contrast    Result Date: 4/7/2021  No acute intracranial abnormality. Microangiopathic change. Ct Chest W Contrast    Result Date: 4/12/2021  1. Status post interval left chest wall transvenous cardiac device placement. Soft tissue density superficial to the cardiac device is presumably postoperative hematoma. Additionally, there is overlying skin thickening and subcutaneous induration which may represent edema or cellulitis. No discrete peripherally enhancing fluid collection to suggest abscess. 2. Cardiomegaly. Coronary artery disease. 3. Cholelithiasis.      Xr Chest Portable    Result NEEDED FOR COUGH     Breo Ellipta 200-25 MCG/INH Aepb inhaler  Generic drug: Fluticasone furoate-vilanterol     carboxymethylcellulose 0.5 % Soln ophthalmic solution  Commonly known as: REFRESH PLUS     cetirizine 10 MG tablet  Commonly known as: ZYRTEC  TAKE 1 TABLET BY MOUTH DAILY     Creon 29330-58844 units delayed release capsule  Generic drug: lipase-protease-amylase  TAKE 1 CAPSULE BY MOUTH THREE TIMES DAILY WITH MEALS     ferrous sulfate 325 (65 Fe) MG EC tablet  Commonly known as: FE TABS 325  Take 1 tablet by mouth 2 times daily (with meals)     fluticasone 50 MCG/ACT nasal spray  Commonly known as: FLONASE  2 sprays by Nasal route daily     gabapentin 300 MG capsule  Commonly known as: NEURONTIN  TAKE 1 CAPSULE BY MOUTH THREE TIMES DAILY     Handicap Placard Misc  by Does not apply route     Incontinence Brief Large Misc  To use as directed.  Use 3x a day     ipratropium-albuterol 0.5-2.5 (3) MG/3ML Soln nebulizer solution  Commonly known as: DUONEB     Multi-Vitamins Tabs  Take 1 tablet by mouth daily     pantoprazole 40 MG tablet  Commonly known as: PROTONIX  TAKE 1 TABLET BY MOUTH DAILY     potassium chloride 20 MEQ extended release tablet  Commonly known as: KLOR-CON M  Take 1 tablet by mouth 2 times daily     rOPINIRole 0.25 MG tablet  Commonly known as: REQUIP  TAKE 1 TABLET BY MOUTH TWICE DAILY     SM Lubricant Eye Drops 0.4-0.3 % ophthalmic solution  Generic drug: polyethyl glycol-propyl glycol 0.4-0.3 %  PLACE 1 DROP INTO BOTH EYES FOUR TIMES DAILY AS NEEDED FOR DRY EYES     tamsulosin 0.4 MG capsule  Commonly known as: FLOMAX  Take 1 capsule by mouth daily     Thera-Gesic 0.5-15 % Crea     tiZANidine 4 MG tablet  Commonly known as: ZANAFLEX  TAKE 1 TABLET BY MOUTH DAILY AS NEEDED     Tri-Balance Orthotics Mens Misc  Provide insurance covered orthotics shoes, wear daily     Ventolin  (90 Base) MCG/ACT inhaler  Generic drug: albuterol sulfate HFA     vitamin B-12 100 MCG tablet  Commonly known as: CYANOCOBALAMIN  TAKE 1 TABLET BY MOUTH DAILY AS NEEDED FOR FATIGUE        STOP taking these medications    Xarelto 20 MG Tabs tablet  Generic drug: rivaroxaban           Where to Get Your Medications      These medications were sent to [facility] [address] [phone] - F [phone]  [address] [facility],  [address]  [address]    Phone: [phone]   · bumetanide 1 MG tablet     You can get these medications from any pharmacy    Bring a paper prescription for each of these medications  · amoxicillin-clavulanate 875-125 MG per tablet         No discharge procedures on file. Time Spent on discharge is  31 mins in patient examination, evaluation, counseling as well as medication reconciliation, prescriptions for required medications, discharge plan and follow up. Electronically signed by   Torie Saldivar MD  4/14/2021  5:56 PM      Thank you Dr. Adrianne Ortiz, PA-C for the opportunity to be involved in this patient's care.

## 2021-04-13 NOTE — PROGRESS NOTES
[facility]  Office: [address], DO, Yeimy Serum, DO, Rylie Carbon, DO, Mele Hager Blood, DO, Moises Hart MD, [place] MD, Dorinda Persaud MD, Friddie Harada, MD, Juliano Slater MD, Vargas Flores MD, Carlos A Damon MD, Arvin Hernandez MD, Addie Pena DO, Krish Whittington MD, Demetria Marcos DO, Jacob Vora MD,  Brenda Burnett DO, Jeovanny Meza MD, Alisia Ireland MD, Linda Moody MD, Leandra Higgins MD, Raysa Simpson [facility], [facility], CNP, Rico Underwood, CNP, Tahir Dawson, CNS, Trang Linda, CNP, Ashok Marie, CNP, Mirna Betancourt, CNP, Samia Varma, CNP, Lui Nash, CNP, Aiden Benz PA-C, Pranay Cohen, [facility], Serafin Cheadle, CNP, Agnes Kirk, CNP, Lilly Pond, CNP, Sabrina Enamorado, CNP, Juana Rosas, [facility], [facility]    Progress Note    4/13/2021    2:21 PM    Name:   Abiola Barlow  MRN:     [id number]     Acct:      [de-identified]   Room:   2035/203501   Day:  0  Admit Date:  4/11/2021 11:14 PM    PCP:   Rayray Escalera PA-C  Code Status:  Full Code    Subjective:     C/C:   Chief Complaint   Patient presents with    Other     swelling around pacemaker today- Lucius advised come to ED     Interval History Status: . Bruise around pacemaker site is unchanged  Hemoglobin in the morning was 9.5, repeat hemoglobin improved to 10.7  Cardiology has signed off, earlier had recommended to hold Xarelto for at least 1 week and can be restarted as an outpatient after wound check  Antibiotics were changed to Augmentin for prevention of pacemaker site infection  Brief History:   Abiola Barlow is a 77 y.o.  Non-/non  male who presents with Other (swelling around pacemaker today- Yelena Farah advised come to ED)   and is admitted to the hospital for the management of At risk for complication at site of pacemaker.     The patient presents to the hospital with complaint negative for chest pain, chest pressure/discomfort, lower extremity edema, palpitations  Gastrointestinal:  negative for abdominal pain, constipation, diarrhea, nausea, vomiting  Neurological:  negative for dizziness, headache    Medications: Allergies:     Allergies   Allergen Reactions    Darvocet [Propoxyphene N-Acetaminophen] Hives    Other Hives    Oxycodone-Acetaminophen     Propoxyphene      Other reaction(s): Unknown       Current Meds:   Scheduled Meds:    ferrous sulfate  325 mg Oral BID WC    ascorbic acid  500 mg Oral Daily    fluticasone  2 spray Nasal Daily    cetirizine  10 mg Oral Daily    potassium chloride  20 mEq Oral BID    multivitamin  1 tablet Oral Daily    polyvinyl alcohol  1 drop Ophthalmic 4x Daily    pantoprazole  40 mg Oral Daily    rOPINIRole  0.25 mg Oral BID    metoprolol succinate  50 mg Oral Nightly    tamsulosin  0.4 mg Oral Daily    Armodafinil  1 tablet Mouth/Throat BID    lipase-protease-amylase  24,000 Units Oral TID     gabapentin  300 mg Oral TID    sodium chloride flush  5-40 mL Intravenous 2 times per day    budesonide-formoterol  2 puff Inhalation BID    amoxicillin-clavulanate  1 tablet Oral 2 times per day    tiZANidine  4 mg Oral Nightly     Continuous Infusions:    sodium chloride       PRN Meds: sodium chloride flush, polyvinyl alcohol, sodium chloride flush, sodium chloride, potassium chloride **OR** potassium alternative oral replacement **OR** potassium chloride, magnesium sulfate, promethazine **OR** ondansetron, polyethylene glycol, nicotine, acetaminophen **OR** acetaminophen    Data:     Past Medical History:   has a past medical history of Acute superficial gastritis without hemorrhage, Anastomotic stricture of stomach, Asthma, Atrial fibrillation (HCC), Calculus of gallbladder without cholecystitis without obstruction, Chronic diastolic heart failure (HCC), Chronic rhinosinusitis, Class 2 severe obesity due to excess calories with serious comorbidity and body mass index (BMI) of 36.0 to 36.9 in adult [facility]), COPD (chronic obstructive pulmonary disease) ([facility] [address] E. coli septicemia ([facility] [address] E. coli UTI, Foot drop, right, Fracture of femur ([facility] [address] Gait disorder, Gastric out let obstruction, GERD (gastroesophageal reflux disease), Hallux valgus, acquired, bilateral, Hyperlipidemia, Hypertension, Impaired hearing, Internal hemorrhoids, Lymphedema of both lower extremities, Nausea & vomiting, OA (osteoarthritis) of knee, bilateral, Obesity, MARIAMA on CPAP, Osteoarthritis, Osteoarthritis of lumbar spine, S/P revision of total knee, Septicemia due to E. coli ([facility] [address] Sick sinus syndrome ([facility] [address] Simple chronic bronchitis (HCC), Slow transit constipation, Unspecified sleep apnea, and UTI (urinary tract infection). Social History:   reports that he quit smoking about 44 years ago. He has a 20.00 pack-year smoking history. He has never used smokeless tobacco. He reports that he does not drink alcohol or use drugs. Family History:   Family History   Problem Relation Age of Onset   Zhou Layer Cancer Mother     Heart Attack Father        Vitals:  /62   Pulse 70   Temp 97.4 °F (36.3 °C) (Temporal)   Resp 16   Ht 5' 10\" (1.778 m)   Wt 207 lb (93.9 kg)   SpO2 98%   BMI 29.70 kg/m²   Temp (24hrs), Av.9 °F (36.6 °C), Min:97.1 °F (36.2 °C), Max:98.7 °F (37.1 °C)    No results for input(s): POCGLU in the last 72 hours. I/O (24Hr):     Intake/Output Summary (Last 24 hours) at 2021 1421  Last data filed at 2021 1310  Gross per 24 hour   Intake 940 ml   Output 2025 ml   Net -1085 ml       Labs:  Hematology:  Recent Labs     21  2356 21  0457 21  1306   WBC 5.3 4.3 4.2   RBC 3.70* 2.99* 3.40*   HGB 11.7* 9.5* 10.7*   HCT 37.3* 30.3* 34.6*   .8 101.3 101.8   MCH 31.6 31.8 31.5   MCHC 31.4 31.4 30.9   RDW 13.9 14.0 13.9    124* 129*   MPV 8.6 8.6 8.6     Chemistry:  Recent Labs     21  2356 21  0219 04/12/21  0815 04/12/21  1348 04/13/21  0457     --   --   --  139   K 3.7  --   --   --  3.7   CL 98  --   --   --  100   CO2 29  --   --   --  30   GLUCOSE 108*  --   --   --  103*   BUN 15  --   --   --  16   CREATININE 0.79  --   --   --  0.91   ANIONGAP 10  --   --   --  9   LABGLOM >60  --   --   --  >60   GFRAA >60  --   --   --  >60   CALCIUM 9.2  --   --   --  8.5*   TROPHS 52* 53* 52* 48*  --    MYOGLOBIN  --   --  91* 73*  --      Recent Labs     04/11/21  2356   PROT 7.1   LABALBU 3.9   AST 21   ALT 7   ALKPHOS 152*   BILITOT 0.98     ABG:No results found for: POCPH, PHART, PH, POCPCO2, BFE4BNP, PCO2, POCPO2, PO2ART, PO2, POCHCO3, NMX0FOO, HCO3, NBEA, PBEA, BEART, BE, THGBART, THB, HMF9WTH, CSLT0KAB, V1KQTACI, O2SAT, FIO2  Lab Results   Component Value Date/Time    SPECIAL NOT REPORTED 04/12/2021 01:00 AM     Lab Results   Component Value Date/Time    CULTURE NO GROWTH 1 DAY 04/12/2021 01:00 AM       [facility]    Result Date: 4/10/2021  No pneumothorax. Stable cardiomegaly with indwelling pacemaker. Elevated right hemidiaphragm with opacity at the right base favoring atelectasis. Ct Head Wo Contrast    Result Date: 4/7/2021  No acute intracranial abnormality. Microangiopathic change. Ct Chest W Contrast    Result Date: 4/12/2021  1. Status post interval left chest wall transvenous cardiac device placement. Soft tissue density superficial to the cardiac device is presumably postoperative hematoma. Additionally, there is overlying skin thickening and subcutaneous induration which may represent edema or cellulitis. No discrete peripherally enhancing fluid collection to suggest abscess. 2. Cardiomegaly. Coronary artery disease. 3. Cholelithiasis. Xr Chest Portable    Result Date: 4/12/2021  No acute process by radiograph. Xr Chest Portable    Result Date: 4/9/2021  No evidence of pneumothorax post pacemaker placement.      Xr Chest Portable    Result Date:

## 2021-04-13 NOTE — PLAN OF CARE
Problem: Falls - Risk of:  Goal: Will remain free from falls  Description: Will remain free from falls  Outcome: Ongoing  Note: . Fall risk assessment completed. Patient instructed to use call light. Bed locked and in lowest position, side rails up 2/4, call light and bedside table within reach, clutter removed, and non-skid footwear on when pt out of bed. Hourly rounds will continue. Problem: Pain:  Goal: Pain level will decrease  Description: Pain level will decrease  Outcome: Ongoing  Note: . Pain assessment completed. Patient demonstrates understanding of pain rating scale and interventions. At this time patient states pain is controlled    Will continue to monitor and reassess with each rounding and PRN. Problem: Skin Integrity:  Goal: Will show no infection signs and symptoms  Description: Will show no infection signs and symptoms  Outcome: Ongoing  Note: .Skin assessment complete. Area kept free from moisture. Proper nourishment and fluids encouraged, as appropriate. Skin remains clean, dry, and intact. Will continue to monitor for additional needs and changes in skin breakdown.

## 2021-04-13 NOTE — PROGRESS NOTES
Transitions of Care Pharmacy Service   Medication Review    The patient's list of current home medications has been reviewed and updated. Source(s) of information: [employer]/[facility]/Previous encounter notes    Please feel free to call with any questions about this encounter. Thank you. Corinne Milliner, [address]  Transitions of Care Pharmacy Service  Phone:  [phone]  Fax: [phone]      Prior to Admission medications    Medication Sig Start Date End Date Taking?  Authorizing Provider   amoxicillin-clavulanate (AUGMENTIN) 875-125 MG per tablet Take 1 tablet by mouth every 12 hours for 10 days 4/13/21 4/23/21 Yes Rei Rodriguez MD   albuterol sulfate HFA (VENTOLIN HFA) 108 (90 Base) MCG/ACT inhaler Inhale 2 puffs into the lungs every 6 hours as needed for Wheezing or Shortness of Breath   Yes Historical Provider, MD   bumetanide (BUMEX) 1 MG tablet Take 2 mg of Bumex in morning, 1 mg of Bumex in afternoon, take 1 mg in evening if weight is >3 above baseline weight 4/10/21  Yes Efren Soto MD   benzonatate (TESSALON) 100 MG capsule TAKE 1 CAPSULE BY MOUTH THREE TIMES DAILY AS NEEDED FOR COUGH 3/31/21  Yes Robert Avilez PA-C   gabapentin (NEURONTIN) 300 MG capsule TAKE 1 CAPSULE BY MOUTH THREE TIMES DAILY 3/31/21 6/29/21 Yes Robert Avilez PA-C   vitamin B-12 (CYANOCOBALAMIN) 100 MCG tablet TAKE 1 TABLET BY MOUTH DAILY AS NEEDED FOR FATIGUE 3/27/21  Yes Robert Avilez PA-C   tamsulosin (FLOMAX) 0.4 MG capsule Take 1 capsule by mouth daily 3/12/21  Yes Robert Avilez PA-C   metoprolol succinate (TOPROL XL) 25 MG extended release tablet TAKE ONE TABLET BY MOUTH TWICE DAILY  Patient taking differently: 50 mg nightly  3/3/21  Yes Robert Avilez PA-C   pantoprazole (PROTONIX) 40 MG tablet TAKE 1 TABLET BY MOUTH DAILY 2/3/21  Yes Robert Avilez PA-C   CREON 53654-48168 units delayed release capsule TAKE 1 CAPSULE BY MOUTH THREE TIMES DAILY WITH MEALS 2/3/21  Yes Theodore Gill PA-C   rOPINIRole (REQUIP) 0.25 MG tablet TAKE 1 TABLET BY MOUTH TWICE DAILY 2/3/21  Yes Theodore Glil PA-C   tiZANidine (ZANAFLEX) 4 MG tablet TAKE 1 TABLET BY MOUTH DAILY AS NEEDED 1/7/21  Yes Theodore Gill PA-C   carboxymethylcellulose (REFRESH PLUS) 0.5 % SOLN ophthalmic solution Apply 1 drop to eye 4 times daily   Yes Historical Provider, MD   Menthol-Methyl Salicylate ([employer] [facility]) 0.5-15 % CREA Apply topically 2 times daily as needed 6/26/20  Yes Historical Provider, MD   potassium chloride (KLOR-CON M) 20 MEQ extended release tablet Take 1 tablet by mouth 2 times daily 12/3/20  Yes Theodore Gill PA-C   Multiple Vitamin (MULTI-VITAMINS) TABS Take 1 tablet by mouth daily 12/3/20  Yes Theodore Gill PA-C   cetirizine (ZYRTEC) 10 MG tablet TAKE 1 TABLET BY MOUTH DAILY 11/12/20  Yes Theodore Gill PA-C   fluticasone [facility]) 50 MCG/ACT nasal spray 2 sprays by Nasal route daily 11/5/20  Yes Theodore Gill PA-C   ascorbic acid (VITAMIN C) 500 MG tablet Take 1 tablet by mouth daily 10/26/20  Yes Theodore Gill PA-C   ferrous sulfate (FE TABS 325) 325 (65 Fe) MG EC tablet Take 1 tablet by mouth 2 times daily (with meals) 10/16/20  Yes Gaviota Escobedo PA-C   SM LUBRICANT EYE DROPS 0.4-0.3 % ophthalmic solution PLACE 1 DROP INTO BOTH EYES FOUR TIMES DAILY AS NEEDED FOR DRY EYES 10/17/19  Yes Theodore Gill PA-C   Fluticasone furoate-vilanterol (BREO ELLIPTA) 200-25 MCG/INH AEPB inhaler Inhale 1 puff into the lungs daily   Yes Historical Provider, MD   Handicap Placard MISC by Does not apply route 6/27/19  Yes Theodore Gill PA-C   Incontinence Supply Disposable (INCONTINENCE BRIEF LARGE) MISC To use as directed.  Use 3x a day 4/30/19  Yes Theodore Gill PA-C   [address] ([employer] MENS) MISC Provide insurance covered orthotics shoes, wear daily 12/12/18  Yes Theodore Gill PA-C   ipratropium-albuterol (DUONEB) 0.5-2.5 (3) MG/3ML SOLN nebulizer solution Inhale 1 vial into the lungs every 4 hours as needed    Yes Historical Provider, MD   Armodafinil 200 MG TABS Take 1 tablet by mouth 2 times daily.   4/2/18  Yes Historical Provider, MD

## 2021-04-13 NOTE — FLOWSHEET NOTE
Patient's spouse called and was updated on the patient's current status and plan of care at this time. Patient's spouse verbalizes understanding. Will continue to monitor closely.

## 2021-04-13 NOTE — FLOWSHEET NOTE
Dr. Sally Ch arrived to the patient's bedside for evaluation and was updated on the patient's current status via RN. Parameters placed on metoprolol to hold for SBP <100 or HR <50. New order received for CBC to be drawn at 1300 and results to be called to Dr. Sally Ch. CBC results will determine plan of care. Patient instructed to follow-up next week and plan to continue augmentin for 10 days. Patient also instructed to check BP at home and follow a fluid restriction of 1800 ml/ day. Patient verbalizes understanding. Will continue to monitor closely.

## 2021-04-13 NOTE — PROGRESS NOTES
Notified by RN of patient having sudden emesis. Patient seen. No clear new symptoms. Added evening Bumex 2 mg dose for now.   Hold discharge until AM.    Electronically signed by Magalys Perez MD on 4/13/2021 at 3:24 PM

## 2021-04-13 NOTE — FLOWSHEET NOTE
RN notified Dr. Marlow Osler via perfect serve that cardiology has signed off. Awaiting response at this time. Will continue to monitor closely.

## 2021-04-13 NOTE — DISCHARGE SUMMARY
Discharge Summary    Date:4/13/2021        Patient Name:Levi Leblanc     YOB: 1945     Age:75 y.o. Admit Date:4/7/2021   Admission Condition:poor   Discharged Condition:fair  Discharge Date: 4/9/2021    Discharge Diagnoses   Sick sinus syndrome with significant bradycardia   - s/p Pacemaker. Chronic diastolic heart failure. Mild renal insufficiency-CKD stage III. Chronic anemia. Morbid obesity. Hospital Stay   Narrative of Hospital Course: He was admitted to our service with significant bradycardia and low blood pressure. On cardiac monitoring, his heart rate continued to be low and his blood pressure was in the 80s and 90s. He had a dual-chamber pacemaker placed and was discharged in a stable condition the following day without any new complaints. His post procedure chest x-ray and device check were stable. Wound check was also stable. Consultants:    Respiratory care. Time Spent on Discharge:  45 minutes were spent in patient examination, evaluation, counseling as well as medication reconciliation, prescriptions for required medications, discharge plan and follow up.       Surgeries/Procedures Performed:  Dual-chamber pacemaker placement      Significant Diagnostic Studies:   Recent Labs:  CBC:   Lab Results   Component Value Date    WBC 4.2 04/13/2021    RBC 3.40 04/13/2021    RBC 4.39 09/13/2011    HGB 10.7 04/13/2021    HCT 34.6 04/13/2021    .8 04/13/2021    MCH 31.5 04/13/2021    MCHC 30.9 04/13/2021    RDW 13.9 04/13/2021     04/13/2021     09/13/2011     BMP:    Lab Results   Component Value Date    GLUCOSE 103 04/13/2021    GLUCOSE 99 09/13/2011     04/13/2021    K 3.7 04/13/2021     04/13/2021    CO2 30 04/13/2021    ANIONGAP 9 04/13/2021    BUN 16 04/13/2021    CREATININE 0.91 04/13/2021    BUNCRER 18 04/13/2021    CALCIUM 8.5 04/13/2021    LABGLOM >60 04/13/2021    GFRAA >60 04/13/2021    GFR      04/13/2021    GFR NOT REPORTED 04/13/2021     HFP:    Lab Results   Component Value Date    PROT 7.1 04/11/2021     CMP:    Lab Results   Component Value Date    GLUCOSE 103 04/13/2021    GLUCOSE 99 09/13/2011     04/13/2021    K 3.7 04/13/2021     04/13/2021    CO2 30 04/13/2021    BUN 16 04/13/2021    CREATININE 0.91 04/13/2021    ANIONGAP 9 04/13/2021    ALKPHOS 152 04/11/2021    ALT 7 04/11/2021    AST 21 04/11/2021    BILITOT 0.98 04/11/2021    LABALBU 3.9 04/11/2021    ALBUMIN NOT REPORTED 04/11/2021    LABGLOM >60 04/13/2021    GFRAA >60 04/13/2021    GFR      04/13/2021    GFR NOT REPORTED 04/13/2021    PROT 7.1 04/11/2021    CALCIUM 8.5 04/13/2021     PT/INR:    Lab Results   Component Value Date    PROTIME 17.7 04/09/2021    PROTIME 16.6 08/16/2018    INR 1.5 04/09/2021     PTT:   Lab Results   Component Value Date    APTT 28.0 10/11/2020       Radiology Last 7 Days:  Xr Chest (2 Vw)    Result Date: 4/10/2021  No pneumothorax. Stable cardiomegaly with indwelling pacemaker. Elevated right hemidiaphragm with opacity at the right base favoring atelectasis. Ct Head Wo Contrast    Result Date: 4/7/2021  No acute intracranial abnormality. Microangiopathic change. Ct Chest W Contrast    Result Date: 4/12/2021  1. Status post interval left chest wall transvenous cardiac device placement. Soft tissue density superficial to the cardiac device is presumably postoperative hematoma. Additionally, there is overlying skin thickening and subcutaneous induration which may represent edema or cellulitis. No discrete peripherally enhancing fluid collection to suggest abscess. 2. Cardiomegaly. Coronary artery disease. 3. Cholelithiasis. Xr Chest Portable    Result Date: 4/12/2021  No acute process by radiograph. Xr Chest Portable    Result Date: 4/9/2021  No evidence of pneumothorax post pacemaker placement.      Xr Chest Portable    Result Date: 4/7/2021  No acute cardiopulmonary disease Stable cardiomegaly       Discharge ipratropium-albuterol 0.5-2.5 (3) MG/3ML Soln nebulizer solution  Commonly known as: DUONEB     Multi-Vitamins Tabs  Take 1 tablet by mouth daily     pantoprazole 40 MG tablet  Commonly known as: PROTONIX  TAKE 1 TABLET BY MOUTH DAILY     potassium chloride 20 MEQ extended release tablet  Commonly known as: KLOR-CON M  Take 1 tablet by mouth 2 times daily     rOPINIRole 0.25 MG tablet  Commonly known as: REQUIP  TAKE 1 TABLET BY MOUTH TWICE DAILY     SM Lubricant Eye Drops 0.4-0.3 % ophthalmic solution  Generic drug: polyethyl glycol-propyl glycol 0.4-0.3 %  PLACE 1 DROP INTO BOTH EYES FOUR TIMES DAILY AS NEEDED FOR DRY EYES     tamsulosin 0.4 MG capsule  Commonly known as: FLOMAX  Take 1 capsule by mouth daily     Thera-Gesic 0.5-15 % Crea     tiZANidine 4 MG tablet  Commonly known as: ZANAFLEX  TAKE 1 TABLET BY MOUTH DAILY AS NEEDED     vitamin B-12 100 MCG tablet  Commonly known as: CYANOCOBALAMIN  TAKE 1 TABLET BY MOUTH DAILY AS NEEDED FOR FATIGUE        STOP taking these medications    albuterol 2 MG tablet  Commonly known as: PROVENTIL     bisacodyl 5 MG EC tablet  Commonly known as: DULCOLAX     D-Mannose 500 MG Caps     magnesium citrate solution     polyethylene glycol 17 GM/SCOOP powder  Commonly known as: GLYCOLAX     Xarelto 20 MG Tabs tablet  Generic drug: rivaroxaban        ASK your doctor about these medications    Handicap Placard Misc  by Does not apply route     Incontinence Brief Large Misc  To use as directed.  Use 3x a day     [employer] Mens Misc  Provide insurance covered orthotics shoes, wear daily           Where to Get Your Medications      These medications were sent to [address] [place] [address], [address] [place] [address] 21  [address] [facility] [address]    Phone: [phone]   · bumetanide 1 MG tablet         Electronically signed by Daryl Herrera MD on 4/13/21 at 3:54 PM EDT

## 2021-04-14 VITALS
RESPIRATION RATE: 16 BRPM | SYSTOLIC BLOOD PRESSURE: 107 MMHG | WEIGHT: 207.3 LBS | BODY MASS INDEX: 29.68 KG/M2 | OXYGEN SATURATION: 100 % | TEMPERATURE: 98.5 F | DIASTOLIC BLOOD PRESSURE: 58 MMHG | HEIGHT: 70 IN | HEART RATE: 70 BPM

## 2021-04-14 PROCEDURE — G0378 HOSPITAL OBSERVATION PER HR: HCPCS

## 2021-04-14 PROCEDURE — 99225 PR SBSQ OBSERVATION CARE/DAY 25 MINUTES: CPT | Performed by: INTERNAL MEDICINE

## 2021-04-14 PROCEDURE — 2580000003 HC RX 258: Performed by: NURSE PRACTITIONER

## 2021-04-14 PROCEDURE — 6370000000 HC RX 637 (ALT 250 FOR IP): Performed by: NURSE PRACTITIONER

## 2021-04-14 PROCEDURE — 6370000000 HC RX 637 (ALT 250 FOR IP): Performed by: INTERNAL MEDICINE

## 2021-04-14 RX ORDER — BUMETANIDE 1 MG/1
2 TABLET ORAL DAILY
COMMUNITY
End: 2021-08-27 | Stop reason: SDUPTHER

## 2021-04-14 RX ORDER — METOPROLOL SUCCINATE 50 MG/1
50 TABLET, EXTENDED RELEASE ORAL NIGHTLY
Qty: 30 TABLET | Refills: 3 | Status: SHIPPED | OUTPATIENT
Start: 2021-04-14 | End: 2021-06-30 | Stop reason: SDUPTHER

## 2021-04-14 RX ORDER — BUMETANIDE 1 MG/1
1 TABLET ORAL 2 TIMES DAILY
Qty: 30 TABLET | Refills: 3 | Status: SHIPPED | OUTPATIENT
Start: 2021-04-14 | End: 2021-04-14 | Stop reason: HOSPADM

## 2021-04-14 RX ADMIN — FERROUS SULFATE TAB EC 325 MG (65 MG FE EQUIVALENT) 325 MG: 325 (65 FE) TABLET DELAYED RESPONSE at 08:56

## 2021-04-14 RX ADMIN — GABAPENTIN 300 MG: 300 CAPSULE ORAL at 08:56

## 2021-04-14 RX ADMIN — MULTIVITAMIN TABLET 1 TABLET: TABLET at 08:56

## 2021-04-14 RX ADMIN — FERROUS SULFATE TAB EC 325 MG (65 MG FE EQUIVALENT) 325 MG: 325 (65 FE) TABLET DELAYED RESPONSE at 17:48

## 2021-04-14 RX ADMIN — PANCRELIPASE 24000 UNITS: 24000; 76000; 120000 CAPSULE, DELAYED RELEASE PELLETS ORAL at 12:37

## 2021-04-14 RX ADMIN — PANTOPRAZOLE SODIUM 40 MG: 40 TABLET, DELAYED RELEASE ORAL at 06:00

## 2021-04-14 RX ADMIN — PANCRELIPASE 24000 UNITS: 24000; 76000; 120000 CAPSULE, DELAYED RELEASE PELLETS ORAL at 17:48

## 2021-04-14 RX ADMIN — PANCRELIPASE 24000 UNITS: 24000; 76000; 120000 CAPSULE, DELAYED RELEASE PELLETS ORAL at 09:00

## 2021-04-14 RX ADMIN — POLYVINYL ALCOHOL 1 DROP: 14 SOLUTION/ DROPS OPHTHALMIC at 08:57

## 2021-04-14 RX ADMIN — OXYCODONE HYDROCHLORIDE AND ACETAMINOPHEN 500 MG: 500 TABLET ORAL at 08:55

## 2021-04-14 RX ADMIN — GABAPENTIN 300 MG: 300 CAPSULE ORAL at 14:15

## 2021-04-14 RX ADMIN — ROPINIROLE HYDROCHLORIDE 0.25 MG: 0.25 TABLET, FILM COATED ORAL at 08:55

## 2021-04-14 RX ADMIN — POTASSIUM CHLORIDE 20 MEQ: 1500 TABLET, EXTENDED RELEASE ORAL at 08:55

## 2021-04-14 RX ADMIN — BUMETANIDE 1 MG: 1 TABLET ORAL at 08:55

## 2021-04-14 RX ADMIN — AMOXICILLIN AND CLAVULANATE POTASSIUM 1 TABLET: 875; 125 TABLET, FILM COATED ORAL at 08:54

## 2021-04-14 RX ADMIN — SODIUM CHLORIDE, PRESERVATIVE FREE 10 ML: 5 INJECTION INTRAVENOUS at 07:23

## 2021-04-14 RX ADMIN — CETIRIZINE HYDROCHLORIDE 10 MG: 10 TABLET, FILM COATED ORAL at 08:56

## 2021-04-14 RX ADMIN — POLYVINYL ALCOHOL 1 DROP: 14 SOLUTION/ DROPS OPHTHALMIC at 14:16

## 2021-04-14 ASSESSMENT — PAIN SCALES - GENERAL: PAINLEVEL_OUTOF10: 0

## 2021-04-14 NOTE — PROGRESS NOTES
Cardiovascular progress Note          Patient name: Lieutenant Hunt    YOB: 1945  Date of admission:  4/11/2021       Patient seen, examined. Previous clinical entries reviewed. All available laboratory, imaging and ancillary data reviewed. Subjective:      Doing better. Denies any new complaints. Systems review:  Constitutional: No fever/chills. HENT: No headache, neck pain or neck stiffness. No sore throat or dysphagia. Gastrointestinal: No abdominal pain, nausea or vomiting. Cardiac: As Above  Respiratory: As above  Neurologic: No new focal weakness or numbness  Psychiatric: Normal mood and mentation       Examination:   Vitals: BP (!) 96/53   Pulse 70   Temp 97.6 °F (36.4 °C) (Temporal)   Resp 16   Ht 5' 10\" (1.778 m)   Wt 207 lb 4.8 oz (94 kg)   SpO2 100%   BMI 29.74 kg/m²     Intake/Output Summary (Last 24 hours) at 4/14/2021 1554  Last data filed at 4/14/2021 1418  Gross per 24 hour   Intake 740 ml   Output 2050 ml   Net -1310 ml     General appearance: Comfortable in no apparent distress. HEENT: No pallor. No icterus  Neck: Supple. Lungs:Generally decreased breath sounds  Heart: S1,S2  Pacer site - more ecchymosis with mild enlargement. Abdomen: Soft  Extremities: No peripheral edema  Skin: No obvious rashes. Musculoskeletal: No obvious deformities. Neurologic: No focal deficits.      Labs/ Ancillary data:     CBC:   Recent Labs     04/11/21  2356 04/13/21  0457 04/13/21  1306   WBC 5.3 4.3 4.2   HGB 11.7* 9.5* 10.7*    124* 129*     BMP:    Recent Labs     04/11/21  2356 04/13/21  0457    139   K 3.7 3.7   CL 98 100   CO2 29 30   BUN 15 16   CREATININE 0.79 0.91   GLUCOSE 108* 103*     Hepatic:   Recent Labs     04/11/21  2356   AST 21   ALT 7   BILITOT 0.98   ALKPHOS 152*     Troponin:   Recent Labs     04/12/21  0219 04/12/21  0815 04/12/21  1348   TROPONINT NOT REPORTED NOT REPORTED NOT REPORTED       Medications:   Scheduled Meds:   bumetanide  1 mg Oral BID    ferrous sulfate  325 mg Oral BID     ascorbic acid  500 mg Oral Daily    fluticasone  2 spray Nasal Daily    cetirizine  10 mg Oral Daily    potassium chloride  20 mEq Oral BID    multivitamin  1 tablet Oral Daily    polyvinyl alcohol  1 drop Ophthalmic 4x Daily    pantoprazole  40 mg Oral Daily    rOPINIRole  0.25 mg Oral BID    metoprolol succinate  50 mg Oral Nightly    tamsulosin  0.4 mg Oral Daily    Armodafinil  1 tablet Mouth/Throat BID    lipase-protease-amylase  24,000 Units Oral TID     gabapentin  300 mg Oral TID    sodium chloride flush  5-40 mL Intravenous 2 times per day    budesonide-formoterol  2 puff Inhalation BID    amoxicillin-clavulanate  1 tablet Oral 2 times per day    tiZANidine  4 mg Oral Nightly     Continuous Infusions:   sodium chloride         Assessment/ Plan :     Pacer site hematoma no clear evidence of any infection. Anemia - concerning for small on-going bleed  Atrial fibrillation-chronic with sick sinus syndrome status post recent pacemaker placement. Chronic diastolic heart failure. Pacer site is stable. No objections to discharge. Thank you very much for allowing us to participate in the care of this patient. Please call us with any questions.       Electronically signed by Martha Smiley MD on 4/14/2021 at 4:01 PM

## 2021-04-14 NOTE — PROGRESS NOTES
Dr Yanira Wood paged to confirm orders for Metoprolol and Bumex as pt states taken differently at home.

## 2021-04-14 NOTE — PROGRESS NOTES
Pt discharged to home with wife. All belongings, including CPAP sent with patient. Resumed home doses of Metoprolol and Bumex per Dr. Faisal Lees and will call for appt on Monday.

## 2021-04-14 NOTE — DISCHARGE INSTR - COC
Continuity of Care Form    Patient Name: Andrew Cruz   :  1945  MRN:  [id number]    Admit date:  2021  Discharge date:  ***    Code Status Order: Full Code   Advance Directives:   Advance Care Flowsheet Documentation     Date/Time Healthcare Directive Type of Healthcare Directive Copy in [address] Agent's Name Healthcare Agent's Phone Number    [phone]  No, patient does not have an advance directive for healthcare treatment -- -- -- -- --          Admitting Physician:  Rm Burch DO  PCP: Misty Alexander PA-C    Discharging Nurse: [facility] Unit/Room#: 2035/2035-01  Discharging Unit Phone Number: ***    Emergency Contact:   Extended Emergency Contact Information  Primary Emergency Contact: Marti  Address: [address] [place] [address] Phone: [phone]  Work Phone: [phone]  Mobile Phone: [phone]  Relation: Spouse  Secondary Emergency Contact: Jessenia May  Address: [address] Phone: [phone]  Work Phone: [phone]  Mobile Phone: [phone]  Relation: Child    Past Surgical History:  Past Surgical History:   Procedure Laterality Date    CARPAL TUNNEL RELEASE      bilateral    COLONOSCOPY      COLONOSCOPY N/A 2021    COLONOSCOPY DIAGNOSTIC performed by Tiburcio Gowers, MD at [address]  2019    by Dr. Mamadou Dowd N/A 2019    CYSTOSCOPY performed by Yang Freeman MD at [address]    first knuckle right index finger   [address]    vertical banded gastroplasty   [address] REPLACEMENT   & 2005    bilateral knees    PACEMAKER INSERTION  2021    [place], placed by Dr. Shania Lopez EGD [address] [facility] [address] N/A 2018    EGD FOREIGN BODY REMOVAL performed by Jose M Alvarado MD at [address] EGD TRANSORAL BIOPSY SINGLE/MULTIPLE N/A 05/25/2018    EGD BIOPSY performed by Anjelica Marvin DO at [address]      left shoulder    UPPER GASTROINTESTINAL ENDOSCOPY  08/13/2018    removal of food bolus    UPPER GASTROINTESTINAL ENDOSCOPY N/A 08/13/2018    EGD FOREIGN BODY REMOVAL performed by Anjelica Marvin DO at [address] [facility] N/A 08/13/2018    EGD BIOPSY performed by Anjelica Marvin DO at [address] [facility]  08/16/2018    egd with balloon dilitation    UPPER GASTROINTESTINAL ENDOSCOPY  08/16/2018    EGD DILATION BALLOON performed by Jose M Alvarado MD at [address] [facility] N/A 10/01/2018    EGD BIOPSY performed by Tiburcio Gowers, MD at [address] [place] [address] ENDOSCOPY  10/01/2018    EGD DILATION BALLOON performed by Tiburcio Gowers, MD at [address] [place] [address] N/A 11/19/2018    EGD DILATION BALLOON performed by Tiburcio Gowers, MD at [address] [place] [address] N/A 10/15/2020    EGD SUBMUCOSAL/BOTOX INJECTION tattoo  performed by Sy Bourgeois MD at [facility]       Immunization History:   Immunization History   Administered Date(s) Administered    Influenza Vaccine, unspecified formulation 01/02/2015    Influenza Virus Vaccine 01/02/2015    Influenza, Quadv, IM, PF (6 mo and older Fluzone, Flulaval, Fluarix, and 3 yrs and older Afluria) 09/28/2019    Pneumococcal Conjugate 13-valent ([id number]) 08/17/2018    Pneumococcal Polysaccharide ([id number]) 10/31/2019    Tdap (Boostrix, Adacel) 04/22/2019       Active Problems:  Patient Active Problem List   Diagnosis Code    Chronic atrial fibrillation (HCC) I48.20    Dyslipidemia E78.5    Gastroesophageal reflux disease without esophagitis K21.9    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    OA (osteoarthritis) of knee, bilateral M17.10    Hallux valgus, acquired, bilateral M20.11, M20.12    Essential hypertension I10    Slow transit constipation K59.01    MARIAMA on CPAP G47.33, Z99.89    Class 2 severe obesity due to excess calories with serious comorbidity and body mass index (BMI) of 36.0 to 36.9 in adult ([facility]) E66.01, Z68.36    Simple chronic bronchitis ([facility]) J41.0    Hospital-acquired bacterial pneumonia J15.9    Fluid overload E87.70    COPD (chronic obstructive pulmonary disease) ([facility]) J44.9    Chronic diastolic heart failure ([facility]) I50.32    Status post gastroplasty Z98.890    Benign prostatic hyperplasia with urinary hesitancy N40.1, R39.11    Myalgia M79.10    Atrial fibrillation with slow ventricular response ([facility]) I48.91    Pacemaker pocket hematoma, initial encounter T82.837A    Cellulitis of chest wall L03.313       Isolation/Infection:   Isolation          No Isolation        Unreconciled Outside Infections     Enable clinical decision support by reconciling outside information with the patient's chart. .    Infection Onset Last Indicated Last Received Source    MRSA 06/14/20 06/14/20 01/14/21 [facility]    .     Unmapped Infections      CRE 06/17/20 06/17/20 07/16/20 [facility]            Patient Infection Status     Infection Onset Added Last Indicated Last Indicated By Review Planned Expiration Resolved Resolved By    None active    Resolved    COVID-19 Rule Out 04/01/21 04/01/21 04/01/21 COVID-19 (Ordered)   04/02/21 Rule-Out Test Resulted    COVID-19 Rule Out 10/11/20 10/11/20 10/11/20 COVID-19, PCR (Ordered)   10/11/20 Rule-Out Test Resulted          Nurse Assessment:  Last Vital Signs: BP (!) 107/58   Pulse 70   Temp 98.5 °F (36.9 °C) (Oral)   Resp 16   Ht 5' 10\" (1.778 m)   Wt 207 lb 4.8 oz (94 kg)   SpO2 100%   BMI 29.74 kg/m²     Last documented pain score (0-10 scale): Pain Level: 2  Last Weight:   Wt Readings from Last 1 Encounters:   04/14/21 207 lb 4.8 oz (94 kg)     Mental Status:  {IP PT MENTAL

## 2021-04-14 NOTE — PROGRESS NOTES
[facility]  Office: [address], DO, Joshua Sexton, DO, Marcus Bagley, DO, John Burch, DO, Jalen Ibarra MD, Toni Maria MD, Jalen Dos Santos MD, Sabrina Brandt MD, Ene Benavidez MD, Collin Reyes MD, Db Cortez MD, Jose Perry MD, Kelsey Vogel DO, Janett Redman MD, Theresa Ball DO, Pranay Ramsey MD,  He Beavers DO, Winston Galarza MD, Derald Jeans, MD, Karthikeyan Powell MD, Jennifer Casanova MD, Ruddy Burns, [facility], [facility] Chen, [facility], Jez Frias, CNP, Teresa Rg, CNS, Isabelle Yates, CNP, Bebe Molina, CNP, Poornima Butterfield, CNP, Massimo Thorpe, CNP, Jason Millan, CNP, Rhoda Montaño PA-C, Dhaval Plasencia, MILY, Blessing Patel, CNP, Naeem Navarro, CNP, Toby Solomon, CNP, Antoine Holter, CNP, Mark Webb, CNP, [facility]    Progress Note    4/14/2021    11:30 AM    Name:   Bradley Burnette  MRN:     [id number]     Acct:      [de-identified]   Room:   2035/2035-01   Day:  0  Admit Date:  4/11/2021 11:14 PM    PCP:   Ambrosio Montelongo PA-C  Code Status:  Full Code    Subjective:     C/C:   Chief Complaint   Patient presents with    Other     swelling around pacemaker today- Nat Harris advised come to ED     Interval History Status:   Discharge was held yesterday due to large vomit, improved with Reglan, no vomiting today, ate breakfast.   Bruise around pacemaker site is unchanged  Hemoglobin last checked was 10.7  Cardiology per verbal information is okay with discharge   earlier had recommended to hold Xarelto for at least 1 week and can be restarted as an outpatient after wound check  Antibiotics were changed to Augmentin for prevention of pacemaker site infection  Brief History:   Bradley Burnette is a 77 y.o.  Non-/non  male who presents with Other (swelling around pacemaker today- Nat Harris advised come to ED)   and is admitted to the hospital for the management of At risk for complication at site of pacemaker.     The patient presents to the hospital with complaint of redness and swelling to his pacemaker site. The patient underwent placement of at [address] [facility] chamber pacemaker 4/9/2021 secondary to sick sinus syndrome with resulting bradycardia. The patient states that he started to develop redness and swelling yesterday morning. He endorses tenderness to the area. He states he had 1 occurrence of emesis yesterday, but attributes it to his previous gastric bypass surgery. He also states he bruises easily due to taking xarelto. He denies fever, chills, chest pain or shortness of breath. No additional symptomology or definitive modifying factors. He has past medical history that includes atrial fibrillation (Xarelto), CHF, MARIAMA with cpap use, COPD, hypertension, dyslipidemia, bilateral foot drop (wears braces to walk) and previous gastric bypass surgery.      He follows outpatient with Dr. Hanny Dutton with cardiology and Dr. Chau Barrientos with GI.      Lactic 1.3, high sensitivity troponin 52, 53, WBC 5.3, hbg 11.7, hct 37.3, platelet 704.     CXR shows no acute process.     CT chest with IV contrast shows:   1. Status post interval left chest wall transvenous cardiac device placement. Soft tissue density superficial to the cardiac device is presumably postoperative hematoma. Additionally, there is overlying skin thickening subcu induration which may represent edema or cellulitis.  No discrete peripherally enhancing fluid collection to suggest abscess. 2. Cardiomegaly.  Coronary artery disease. 3. Cholelithiasis.      Echo 6.21.2020    Left Ventricle: Systolic function is normal with an ejection fraction of 65-70%.   Tricuspid Valve: The right ventricular systolic pressure is mild to moderately elevated. RVSP estimated at 30-35 mmHg.   Tricuspid Valve:  There is mild to moderate pulmonary hypertension.         Review of Systems:     Constitutional:  negative for chills, fevers, cholecystitis without obstruction, Chronic diastolic heart failure (HCC), Chronic rhinosinusitis, Class 2 severe obesity due to excess calories with serious comorbidity and body mass index (BMI) of 36.0 to 36.9 in adult [facility]), COPD (chronic obstructive pulmonary disease) ([facility] E. coli septicemia ([facility] E. coli UTI, Foot drop, right, Fracture of femur ([facility] Gait disorder, Gastric out let obstruction, GERD (gastroesophageal reflux disease), Hallux valgus, acquired, bilateral, Hyperlipidemia, Hypertension, Impaired hearing, Internal hemorrhoids, Lymphedema of both lower extremities, Nausea & vomiting, OA (osteoarthritis) of knee, bilateral, Obesity, MARIAMA on CPAP, Osteoarthritis, Osteoarthritis of lumbar spine, S/P revision of total knee, Septicemia due to E. coli ([facility] Sick sinus syndrome ([facility] Simple chronic bronchitis (HCC), Slow transit constipation, Unspecified sleep apnea, and UTI (urinary tract infection). Social History:   reports that he quit smoking about 44 years ago. He has a 20.00 pack-year smoking history. He has never used smokeless tobacco. He reports that he does not drink alcohol or use drugs. Family History:   Family History   Problem Relation Age of Onset   [place] Alicia Cancer Mother     Heart Attack Father        Vitals:  BP (!) 96/58   Pulse 70   Temp 97.5 °F (36.4 °C) (Oral)   Resp 16   Ht 5' 10\" (1.778 m)   Wt 207 lb 4.8 oz (94 kg)   SpO2 97%   BMI 29.74 kg/m²   Temp (24hrs), Av.6 °F (36.4 °C), Min:96.9 °F (36.1 °C), Max:98.1 °F (36.7 °C)    No results for input(s): POCGLU in the last 72 hours. I/O (24Hr):     Intake/Output Summary (Last 24 hours) at 2021 1130  Last data filed at 2021 0838  Gross per 24 hour   Intake 980 ml   Output 1750 ml   Net -770 ml       Labs:  Hematology:  Recent Labs     21  2356 21  0457 21  1306   WBC 5.3 4.3 4.2   RBC 3.70* 2.99* 3.40*   HGB 11.7* 9.5* 10.7*   HCT 37.3* 30.3* 34.6*   .8 101.3 101.8   MCH 31.6 31.8 31.5 MCHC 31.4 31.4 30.9   RDW 13.9 14.0 13.9    124* 129*   MPV 8.6 8.6 8.6     Chemistry:  Recent Labs     04/11/21  2356 04/12/21  0219 04/12/21  0815 04/12/21  1348 04/13/21  0457     --   --   --  139   K 3.7  --   --   --  3.7   CL 98  --   --   --  100   CO2 29  --   --   --  30   GLUCOSE 108*  --   --   --  103*   BUN 15  --   --   --  16   CREATININE 0.79  --   --   --  0.91   ANIONGAP 10  --   --   --  9   LABGLOM >60  --   --   --  >60   GFRAA >60  --   --   --  >60   CALCIUM 9.2  --   --   --  8.5*   TROPHS 52* 53* 52* 48*  --    MYOGLOBIN  --   --  91* 73*  --      Recent Labs     04/11/21  2356   PROT 7.1   LABALBU 3.9   AST 21   ALT 7   ALKPHOS 152*   BILITOT 0.98     ABG:No results found for: POCPH, PHART, PH, POCPCO2, NMI2ZFQ, PCO2, POCPO2, PO2ART, PO2, POCHCO3, WBO6UMR, HCO3, NBEA, PBEA, BEART, BE, THGBART, THB, VIP0QCK, YRUB3TRI, I7FUMWKR, O2SAT, FIO2  Lab Results   Component Value Date/Time    SPECIAL NOT REPORTED 04/12/2021 01:00 AM     Lab Results   Component Value Date/Time    CULTURE NO GROWTH 2 DAYS 04/12/2021 01:00 AM       Radiology:  Amarilis Bridgette Chest (2 Vw)    Result Date: 4/10/2021  No pneumothorax. Stable cardiomegaly with indwelling pacemaker. Elevated right hemidiaphragm with opacity at the right base favoring atelectasis. Ct Head Wo Contrast    Result Date: 4/7/2021  No acute intracranial abnormality. Microangiopathic change. Ct Chest W Contrast    Result Date: 4/12/2021  1. Status post interval left chest wall transvenous cardiac device placement. Soft tissue density superficial to the cardiac device is presumably postoperative hematoma. Additionally, there is overlying skin thickening and subcutaneous induration which may represent edema or cellulitis. No discrete peripherally enhancing fluid collection to suggest abscess. 2. Cardiomegaly. Coronary artery disease. 3. Cholelithiasis. Xr Chest Portable    Result Date: 4/12/2021  No acute process by radiograph. Xr Chest Portable    Result Date: 4/9/2021  No evidence of pneumothorax post pacemaker placement. Xr Chest Portable    Result Date: 4/7/2021  No acute cardiopulmonary disease Stable cardiomegaly       Physical Examination:        General appearance:  alert, cooperative and no distress  Mental Status:  oriented to person, place and time and normal affect  Lungs:  clear to auscultation bilaterally, normal effort  Heart: Irregular rhythm, no murmur,+ bruise and mild swelling at pacemaker site  Abdomen:  soft, nontender, nondistended, normal bowel sounds, no masses, hepatomegaly, splenomegaly  Extremities:  no edema, redness, tenderness in the calves  Skin:  no gross lesions, rashes, induration    Assessment:        Hospital Problems           Last Modified POA    * (Principal) Pacemaker pocket hematoma, initial encounter 4/12/2021 Yes    Chronic atrial fibrillation ([address] 4/12/2021 Yes    Gastroesophageal reflux disease without esophagitis 4/12/2021 Yes    Essential hypertension 4/12/2021 Yes    MARIAMA on CPAP 4/12/2021 Yes    COPD (chronic obstructive pulmonary disease) ([address] 4/12/2021 Yes    Chronic diastolic heart failure ([address] 4/12/2021 Yes          Plan:        1. Continue Augmentin till recommended by cardiology  2. Keep Xarelto on hold at least for 1 week till seen by cardiology in office  3.  Discharge home, cardiology is okay with discharge    Ema Bravo MD  4/14/2021  11:30 AM

## 2021-04-14 NOTE — FLOWSHEET NOTE
Patient calls out to writer from room; requests a bible. Writer provides presence, support, and bible. Patient expresses gratitude; states he expects to be discharged later today. Spiritual Care will follow as needed. 04/14/21 1515   Encounter Summary   Services provided to: Patient   Referral/Consult From: Patient   Continue Visiting   (4/14/21)   Complexity of Encounter Low   Length of Encounter 15 minutes   Routine   Type Follow up   Assessment Calm; Approachable;Coping   Intervention Sustaining presence/ Ministry of presence;Provided reading materials/devotional materials   Outcome Expressed gratitude

## 2021-04-16 ENCOUNTER — TELEPHONE (OUTPATIENT)
Dept: PRIMARY CARE CLINIC | Age: 76
End: 2021-04-16

## 2021-04-16 NOTE — TELEPHONE ENCOUNTER
[facility] Transitions Initial Follow Up Call    Outreach made within 2 business days of discharge: Yes    Patient: Cathleen Stewart Patient : 1945   MRN: [id number]  Reason for Admission: There are no discharge diagnoses documented for the most recent discharge. Discharge Date: 21       Spoke with: Miguel Angel Elizabeth calling, no answer. Looks like patient has a hospital f/u schedule with Darius Anton      Discharge department/facility:     [facility] Interactive Patient Contact:      Scheduled appointment with PCP within 7-14 days    Follow Up  Future Appointments   Date Time Provider Jeri Flynn   2021 11:00 AM JULIAN Lam - CNP [facility] WALK IN [facility]

## 2021-04-27 ENCOUNTER — OFFICE VISIT (OUTPATIENT)
Dept: PRIMARY CARE CLINIC | Age: 76
End: 2021-04-27
Payer: MEDICARE

## 2021-04-27 VITALS
TEMPERATURE: 97.1 F | SYSTOLIC BLOOD PRESSURE: 87 MMHG | WEIGHT: 207.2 LBS | OXYGEN SATURATION: 96 % | HEART RATE: 67 BPM | BODY MASS INDEX: 29.73 KG/M2 | DIASTOLIC BLOOD PRESSURE: 55 MMHG

## 2021-04-27 DIAGNOSIS — S20.212D CHEST WALL HEMATOMA, LEFT, SUBSEQUENT ENCOUNTER: Primary | ICD-10-CM

## 2021-04-27 DIAGNOSIS — H69.83 EUSTACHIAN TUBE DYSFUNCTION, BILATERAL: ICD-10-CM

## 2021-04-27 DIAGNOSIS — I50.32 CHRONIC DIASTOLIC HEART FAILURE (HCC): ICD-10-CM

## 2021-04-27 DIAGNOSIS — J30.2 SEASONAL ALLERGIES: ICD-10-CM

## 2021-04-27 PROCEDURE — G8427 DOCREV CUR MEDS BY ELIG CLIN: HCPCS | Performed by: NURSE PRACTITIONER

## 2021-04-27 PROCEDURE — 4040F PNEUMOC VAC/ADMIN/RCVD: CPT | Performed by: NURSE PRACTITIONER

## 2021-04-27 PROCEDURE — 1111F DSCHRG MED/CURRENT MED MERGE: CPT | Performed by: NURSE PRACTITIONER

## 2021-04-27 PROCEDURE — 99214 OFFICE O/P EST MOD 30 MIN: CPT | Performed by: NURSE PRACTITIONER

## 2021-04-27 PROCEDURE — 1123F ACP DISCUSS/DSCN MKR DOCD: CPT | Performed by: NURSE PRACTITIONER

## 2021-04-27 PROCEDURE — G8417 CALC BMI ABV UP PARAM F/U: HCPCS | Performed by: NURSE PRACTITIONER

## 2021-04-27 PROCEDURE — 3017F COLORECTAL CA SCREEN DOC REV: CPT | Performed by: NURSE PRACTITIONER

## 2021-04-27 PROCEDURE — 1036F TOBACCO NON-USER: CPT | Performed by: NURSE PRACTITIONER

## 2021-04-27 RX ORDER — LORATADINE 10 MG/1
10 TABLET ORAL DAILY
Qty: 90 TABLET | Refills: 1 | Status: ON HOLD
Start: 2021-04-27 | End: 2022-01-10 | Stop reason: HOSPADM

## 2021-04-27 ASSESSMENT — ENCOUNTER SYMPTOMS
VOMITING: 0
ABDOMINAL PAIN: 0
WHEEZING: 0
RHINORRHEA: 0
BLOOD IN STOOL: 0
DIARRHEA: 0
SHORTNESS OF BREATH: 0
TROUBLE SWALLOWING: 0
FACIAL SWELLING: 0

## 2021-04-27 NOTE — PROGRESS NOTES
--Seb Larios MA on 4/27/2021 at 9:59 AM  Visit Information    Have you changed or started any medications since your last visit including any over-the-counter medicines, vitamins, or herbal medicines? no   Are you having any side effects from any of your medications? -  no  Have you stopped taking any of your medications? Is so, why? -  no    Have you seen any other physician or provider since your last visit? Yes - Records Obtained  Have you had any other diagnostic tests since your last visit? Yes - Records Obtained  Have you been seen in the emergency room and/or had an admission to a hospital since we last saw you? Yes - Records Obtained  Have you had your routine dental cleaning in the past 6 months? no    Have you activated your [employer] account? If not, what are your barriers?  Yes     Patient Care Team:  Belen Bates PA-C as PCP - General (Physician Assistant)  Belen Bates PA-C as PCP - [address]  Angie Hussein MD as Consulting Physician (Gastroenterology)  Beryle Olive, MD as Consulting Physician (Cardiology)  Asiya Mc MD as Consulting Physician (Pulmonology)  Cristina Fine MD as Consulting Physician (Urology)    Medical History Review  Past Medical, Family, and Social History reviewed and does not contribute to the patient presenting condition    Health Maintenance   Topic Date Due    COVID-19 Vaccine (1) Never done    Shingles Vaccine (1 of 2) Never done   [employer] Visit (AWV)  Never done    Flu vaccine (Season Ended) 11/05/2021 (Originally 9/1/2021)    Potassium monitoring  04/13/2022    Creatinine monitoring  04/13/2022    Lipid screen  04/01/2026    DTaP/Tdap/Td vaccine (2 - Td) 04/22/2029    Colon cancer screen colonoscopy  04/05/2031    Pneumococcal 65+ years Vaccine  Completed    AAA screen  Completed    Hepatitis C screen  Completed    Hepatitis A vaccine  Aged Out    Hepatitis B vaccine Aged Out    Hib vaccine  Aged Out    Meningococcal (ACWY) vaccine  Aged Out

## 2021-04-27 NOTE — PROGRESS NOTES
Post-Discharge Transitional Care Management Services or Hospital Follow Up      Miguel Stoddard   YOB: 1945    Date of Office Visit:  4/27/2021  Date of Hospital Admission: 4/11/21  Date of Hospital Discharge: 4/14/21  Readmission Risk Score(high >=14%.  Medium >=10%):Readmission Risk Score: 24      Care management risk score Rising risk (score 2-5) and Complex Care (Scores >=6): 7     Non face to face  following discharge, date last encounter closed (first attempt may have been earlier): 4/16/2021  2:15 PM 4/16/2021  2:15 PM    Call initiated 2 business days of discharge: Yes     Patient Active Problem List   Diagnosis    Chronic atrial fibrillation ([address]    Dyslipidemia    Gastroesophageal reflux disease without esophagitis    Osteoarthritis of lumbar spine    OA (osteoarthritis) of knee, bilateral    Hallux valgus, acquired, bilateral    Essential hypertension    Slow transit constipation    MARIAMA on CPAP    Class 2 severe obesity due to excess calories with serious comorbidity and body mass index (BMI) of 36.0 to 36.9 in adult [facility])    Simple chronic bronchitis (HCC)    Hospital-acquired bacterial pneumonia    Fluid overload    COPD (chronic obstructive pulmonary disease) (HCC)    Chronic diastolic heart failure (HCC)    Status post gastroplasty    Benign prostatic hyperplasia with urinary hesitancy    Myalgia    Atrial fibrillation with slow ventricular response ([address]    Pacemaker pocket hematoma, initial encounter    Cellulitis of chest wall       Allergies   Allergen Reactions    Darvocet [Propoxyphene N-Acetaminophen] Hives    Other Hives    Oxycodone-Acetaminophen     Propoxyphene      Other reaction(s): Unknown       Medications listed as ordered at the time of discharge from hospital   Nish Moore   [place] Medication Instructions JOE:    Printed on:04/27/21 1121   Medication Information                      albuterol sulfate HFA (VENTOLIN HFA) 108 (90 Base) MCG/ACT inhaler  Inhale 2 puffs into the lungs every 6 hours as needed for Wheezing or Shortness of Breath             Armodafinil 200 MG TABS  Take 1 tablet by mouth 2 times daily. ascorbic acid (VITAMIN C) 500 MG tablet  Take 1 tablet by mouth daily             benzonatate (TESSALON) 100 MG capsule  TAKE 1 CAPSULE BY MOUTH THREE TIMES DAILY AS NEEDED FOR COUGH             bumetanide (BUMEX) 1 MG tablet  Take 1 mg by mouth daily Take 2 mg of Bumex in morning, 1 mg of Bumex in afternoon, take 1 mg in evening if weight is >3 above baseline weight             carboxymethylcellulose (REFRESH PLUS) 0.5 % SOLN ophthalmic solution  Apply 1 drop to eye 4 times daily             CREON 41049-12048 units delayed release capsule  TAKE 1 CAPSULE BY MOUTH THREE TIMES DAILY WITH MEALS             ferrous sulfate (FE TABS 325) 325 (65 Fe) MG EC tablet  Take 1 tablet by mouth 2 times daily (with meals)             fluticasone (FLONASE) 50 MCG/ACT nasal spray  2 sprays by Nasal route daily             Fluticasone furoate-vilanterol (BREO ELLIPTA) 200-25 MCG/INH AEPB inhaler  Inhale 1 puff into the lungs daily             Foot Care Products (TRI-BALANCE ORTHOTICS MENS) MISC  Provide insurance covered orthotics shoes, wear daily             gabapentin (NEURONTIN) 300 MG capsule  TAKE 1 CAPSULE BY MOUTH THREE TIMES DAILY             Handicap Placard MISC  by Does not apply route             Incontinence Supply Disposable (INCONTINENCE BRIEF LARGE) MISC  To use as directed.  Use 3x a day             ipratropium-albuterol (DUONEB) 0.5-2.5 (3) MG/3ML SOLN nebulizer solution  Inhale 1 vial into the lungs every 4 hours as needed              loratadine (CLARITIN) 10 MG tablet  Take 1 tablet by mouth daily             Menthol-Methyl Salicylate (THERA-GESIC) 0.5-15 % CREA  Apply topically 2 times daily as needed             metoprolol succinate (TOPROL XL) 50 MG extended release tablet  Take 1 tablet by mouth nightly Multiple Vitamin (MULTI-VITAMINS) TABS  Take 1 tablet by mouth daily             pantoprazole (PROTONIX) 40 MG tablet  TAKE 1 TABLET BY MOUTH DAILY             potassium chloride (KLOR-CON M) 20 MEQ extended release tablet  Take 1 tablet by mouth 2 times daily             rOPINIRole (REQUIP) 0.25 MG tablet  TAKE 1 TABLET BY MOUTH TWICE DAILY             SM LUBRICANT EYE DROPS 0.4-0.3 % ophthalmic solution  PLACE 1 DROP INTO BOTH EYES FOUR TIMES DAILY AS NEEDED FOR DRY EYES             tamsulosin (FLOMAX) 0.4 MG capsule  Take 1 capsule by mouth daily             tiZANidine (ZANAFLEX) 4 MG tablet  TAKE 1 TABLET BY MOUTH DAILY AS NEEDED             vitamin B-12 (CYANOCOBALAMIN) 100 MCG tablet  TAKE 1 TABLET BY MOUTH DAILY AS NEEDED FOR FATIGUE                   Medications marked \"taking\" at this time  Outpatient Medications Marked as Taking for the 4/27/21 encounter (Office Visit) with JULIAN Carrillo CNP   Medication Sig Dispense Refill    loratadine (CLARITIN) 10 MG tablet Take 1 tablet by mouth daily 90 tablet 1    metoprolol succinate (TOPROL XL) 50 MG extended release tablet Take 1 tablet by mouth nightly 30 tablet 3    bumetanide (BUMEX) 1 MG tablet Take 1 mg by mouth daily Take 2 mg of Bumex in morning, 1 mg of Bumex in afternoon, take 1 mg in evening if weight is >3 above baseline weight      albuterol sulfate HFA (VENTOLIN HFA) 108 (90 Base) MCG/ACT inhaler Inhale 2 puffs into the lungs every 6 hours as needed for Wheezing or Shortness of Breath      benzonatate (TESSALON) 100 MG capsule TAKE 1 CAPSULE BY MOUTH THREE TIMES DAILY AS NEEDED FOR COUGH 30 capsule 3    gabapentin (NEURONTIN) 300 MG capsule TAKE 1 CAPSULE BY MOUTH THREE TIMES DAILY 84 capsule 2    vitamin B-12 (CYANOCOBALAMIN) 100 MCG tablet TAKE 1 TABLET BY MOUTH DAILY AS NEEDED FOR FATIGUE 30 tablet 5    tamsulosin (FLOMAX) 0.4 MG capsule Take 1 capsule by mouth daily 30 capsule 5    pantoprazole (PROTONIX) 40 MG tablet TAKE 1 TABLET BY MOUTH DAILY 28 tablet 3    CREON 13875-08933 units delayed release capsule TAKE 1 CAPSULE BY MOUTH THREE TIMES DAILY WITH MEALS 84 capsule 3    rOPINIRole (REQUIP) 0.25 MG tablet TAKE 1 TABLET BY MOUTH TWICE DAILY 56 tablet 3    tiZANidine (ZANAFLEX) 4 MG tablet TAKE 1 TABLET BY MOUTH DAILY AS NEEDED 28 tablet 3    carboxymethylcellulose (REFRESH PLUS) 0.5 % SOLN ophthalmic solution Apply 1 drop to eye 4 times daily      Menthol-Methyl Salicylate (THERA-GESIC) 0.5-15 % CREA Apply topically 2 times daily as needed      potassium chloride (KLOR-CON M) 20 MEQ extended release tablet Take 1 tablet by mouth 2 times daily 56 tablet 5    Multiple Vitamin (MULTI-VITAMINS) TABS Take 1 tablet by mouth daily 28 tablet 5    fluticasone (FLONASE) 50 MCG/ACT nasal spray 2 sprays by Nasal route daily 16 g 3    ascorbic acid (VITAMIN C) 500 MG tablet Take 1 tablet by mouth daily 30 tablet 2    ferrous sulfate (FE TABS 325) 325 (65 Fe) MG EC tablet Take 1 tablet by mouth 2 times daily (with meals) 90 tablet 3    SM LUBRICANT EYE DROPS 0.4-0.3 % ophthalmic solution PLACE 1 DROP INTO BOTH EYES FOUR TIMES DAILY AS NEEDED FOR DRY EYES 15 mL 3    Fluticasone furoate-vilanterol (BREO ELLIPTA) 200-25 MCG/INH AEPB inhaler Inhale 1 puff into the lungs daily      Handicap Placard MISC by Does not apply route 1 each 0    Incontinence Supply Disposable (INCONTINENCE BRIEF LARGE) MISC To use as directed. Use 3x a day 90 each 3    Foot Care Products (TRI-BALANCE ORTHOTICS MENS) MISC Provide insurance covered orthotics shoes, wear daily 2 each 0    ipratropium-albuterol (DUONEB) 0.5-2.5 (3) MG/3ML SOLN nebulizer solution Inhale 1 vial into the lungs every 4 hours as needed       Armodafinil 200 MG TABS Take 1 tablet by mouth 2 times daily.            Medications patient taking as of now reconciled against medications ordered at time of hospital discharge: Yes    Chief Complaint   Patient presents with    Follow-Up from Hospital     swelling in chest after pacemaker placed    Ear Problem     Left       Since discharge, site swelling has greatly improved. \"was 4x bigger\". Pain is improved  Completed the abx  Cardiology restarted Xarelto yesterday    Denies fatigue or SOB, feels more alert and focused since pacemaker was placed  Her is concerned that he's become \"weaker\"    + dizziness, only when I bend over. This is less severe    He is c/o pain in his ears today, would like me to look. + muffled hearing. Ted Nicely is asking to switch from Zyrtec, he feels it is no longer effective. He is using this plus fluticasone daily. Ted Nicely admits he was told by ear nose and throat previously that his eustachian tube oftentimes collapsed. Inpatient course: Discharge summary reviewed- see chart. Mr. Ruddy Carlson was admitted on 4/7/2021 through 4/10/2021 at [facility].  He was experiencing bradycardia and low blood pressure. At that time dual-chamber pacemaker was placed. Mr. Ruddy Carlson [facility] on 4/11/2021 was admitted through 5/64/2497 due to complications from his pacemaker placement. He complained of redness and swelling at the pacemaker site. Due to hematoma around pacemaker, cardiology recommended holding Xarelto for 1 week and to be restarted after outpatient wound check. Augmentin initiated for prevention of pacemaker site infection. Hemoglobin at discharge was 10.7. Interval history/Current status: resolving    Review of Systems   Constitutional: Negative for appetite change, fatigue, fever and unexpected weight change. HENT: Positive for congestion and ear pain. Negative for ear discharge, facial swelling, hearing loss, rhinorrhea and trouble swallowing. Eyes: Negative for visual disturbance. Respiratory: Negative for shortness of breath and wheezing. Cardiovascular: Positive for leg swelling. Negative for chest pain and palpitations.    Gastrointestinal: Negative for abdominal pain, blood in stool, diarrhea and vomiting. Endocrine: Negative for polydipsia and polyuria. Genitourinary: Negative for dysuria, frequency and hematuria. Musculoskeletal: Negative for myalgias and neck pain. Skin: Negative for wound. Neurological: Positive for dizziness. Negative for seizures, syncope, numbness and headaches. Psychiatric/Behavioral: Negative for suicidal ideas. The patient is not nervous/anxious. Vitals:    04/27/21 0959   BP: (!) 87/55   Site: Right Upper Arm   Position: Sitting   Cuff Size: Medium Adult   Pulse: 67   Temp: 97.1 °F (36.2 °C)   TempSrc: Temporal   SpO2: 96%   Weight: 207 lb 3.2 oz (94 kg)     Body mass index is 29.73 kg/m². Wt Readings from Last 3 Encounters:   04/27/21 207 lb 3.2 oz (94 kg)   04/14/21 207 lb 4.8 oz (94 kg)   04/08/21 224 lb 6.4 oz (101.8 kg)     BP Readings from Last 3 Encounters:   04/27/21 (!) 87/55   04/14/21 (!) 107/58   04/10/21 114/67       Physical Exam  Constitutional:       Appearance: Normal appearance. He is not ill-appearing or toxic-appearing. HENT:      Head: Normocephalic. Eyes:      General: No scleral icterus. Pupils: Pupils are equal, round, and reactive to light. Cardiovascular:      Rate and Rhythm: Normal rate. Pulmonary:      Effort: Pulmonary effort is normal.      Breath sounds: Normal breath sounds. Chest:       Abdominal:      Palpations: Abdomen is soft. Tenderness: There is no abdominal tenderness. There is no right CVA tenderness or left CVA tenderness. Neurological:      Mental Status: He is alert. Assessment/Plan:  1. Chronic diastolic heart failure [facility])  Continue with cardiology evaluation, upcoming appointment on Thursday. Blood pressure mildly low today, no evidence of bradycardia. Patient is asymptomatic.  - SC DISCHARGE MEDS RECONCILED W/ CURRENT OUTPATIENT MED LIST    2. Seasonal allergies  We will switch from Zyrtec to Claritin. - loratadine (CLARITIN) 10 MG tablet;  Take 1 tablet by mouth daily  Dispense: 90 tablet; Refill: 1    3. Eustachian tube dysfunction, bilateral  Highly encouraged use of [place] pot or other nasal saline flush. No evidence of cerumen impaction. 4. Chest wall hematoma, left, subsequent encounter  Slowly improving.   No concern for infection on palpation        Medical Decision Making: moderate complexity

## 2021-04-30 DIAGNOSIS — M79.10 MYALGIA: ICD-10-CM

## 2021-04-30 RX ORDER — TIZANIDINE 4 MG/1
TABLET ORAL
Qty: 28 TABLET | Refills: 3 | Status: SHIPPED | OUTPATIENT
Start: 2021-04-30 | End: 2021-08-20

## 2021-05-01 ENCOUNTER — APPOINTMENT (OUTPATIENT)
Dept: GENERAL RADIOLOGY | Age: 76
DRG: 193 | End: 2021-05-01
Payer: MEDICARE

## 2021-05-01 ENCOUNTER — TELEPHONE (OUTPATIENT)
Dept: OTHER | Facility: CLINIC | Age: 76
End: 2021-05-01

## 2021-05-01 ENCOUNTER — HOSPITAL ENCOUNTER (INPATIENT)
Age: 76
LOS: 5 days | Discharge: HOME HEALTH | DRG: 193 | End: 2021-05-06
Attending: EMERGENCY MEDICINE | Admitting: INTERNAL MEDICINE
Payer: MEDICARE

## 2021-05-01 DIAGNOSIS — J18.9 PNEUMONIA OF BOTH LUNGS DUE TO INFECTIOUS ORGANISM, UNSPECIFIED PART OF LUNG: ICD-10-CM

## 2021-05-01 DIAGNOSIS — J18.9 PNEUMONIA DUE TO ORGANISM: Primary | ICD-10-CM

## 2021-05-01 PROBLEM — N40.0 BPH (BENIGN PROSTATIC HYPERPLASIA): Status: ACTIVE | Noted: 2021-03-22

## 2021-05-01 PROBLEM — J96.01 ACUTE HYPOXEMIC RESPIRATORY FAILURE (HCC): Status: ACTIVE | Noted: 2021-05-01

## 2021-05-01 LAB
-: NORMAL
ABSOLUTE EOS #: 0 K/UL (ref 0–0.44)
ABSOLUTE IMMATURE GRANULOCYTE: 0.16 K/UL (ref 0–0.3)
ABSOLUTE LYMPH #: 0.64 K/UL (ref 1.1–3.7)
ABSOLUTE MONO #: 0.97 K/UL (ref 0.1–1.2)
ALBUMIN SERPL-MCNC: 4 G/DL (ref 3.5–5.2)
ALBUMIN/GLOBULIN RATIO: ABNORMAL (ref 1–2.5)
ALP BLD-CCNC: 136 U/L (ref 40–129)
ALT SERPL-CCNC: 17 U/L (ref 5–41)
AMORPHOUS: NORMAL
ANION GAP SERPL CALCULATED.3IONS-SCNC: 13 MMOL/L (ref 9–17)
AST SERPL-CCNC: 46 U/L
BACTERIA: NORMAL
BASOPHILS # BLD: 0 % (ref 0–2)
BASOPHILS ABSOLUTE: 0 K/UL (ref 0–0.2)
BILIRUB SERPL-MCNC: 1.45 MG/DL (ref 0.3–1.2)
BILIRUBIN URINE: NEGATIVE
BNP INTERPRETATION: ABNORMAL
BUN BLDV-MCNC: 17 MG/DL (ref 8–23)
BUN/CREAT BLD: 17 (ref 9–20)
CALCIUM SERPL-MCNC: 9.2 MG/DL (ref 8.6–10.4)
CASTS UA: NORMAL /LPF
CHLORIDE BLD-SCNC: 100 MMOL/L (ref 98–107)
CO2: 27 MMOL/L (ref 20–31)
COLOR: YELLOW
COMMENT UA: ABNORMAL
CREAT SERPL-MCNC: 1 MG/DL (ref 0.7–1.2)
CRYSTALS, UA: NORMAL /HPF
DIFFERENTIAL TYPE: ABNORMAL
EOSINOPHILS RELATIVE PERCENT: 0 % (ref 1–4)
EPITHELIAL CELLS UA: NORMAL /HPF (ref 0–5)
FIO2: ABNORMAL
GFR AFRICAN AMERICAN: >60 ML/MIN
GFR NON-AFRICAN AMERICAN: >60 ML/MIN
GFR SERPL CREATININE-BSD FRML MDRD: ABNORMAL ML/MIN/{1.73_M2}
GFR SERPL CREATININE-BSD FRML MDRD: ABNORMAL ML/MIN/{1.73_M2}
GLUCOSE BLD-MCNC: 116 MG/DL (ref 70–99)
GLUCOSE URINE: NEGATIVE
HCO3 VENOUS: 32.7 MMOL/L (ref 24–30)
HCT VFR BLD CALC: 38 % (ref 40.7–50.3)
HEMOGLOBIN: 12.1 G/DL (ref 13–17)
IMMATURE GRANULOCYTES: 1 %
KETONES, URINE: NEGATIVE
LEUKOCYTE ESTERASE, URINE: NEGATIVE
LIPASE: 8 U/L (ref 13–60)
LYMPHOCYTES # BLD: 4 % (ref 24–43)
MCH RBC QN AUTO: 32 PG (ref 25.2–33.5)
MCHC RBC AUTO-ENTMCNC: 31.8 G/DL (ref 28.4–34.8)
MCV RBC AUTO: 100.5 FL (ref 82.6–102.9)
MONOCYTES # BLD: 6 % (ref 3–12)
MUCUS: NORMAL
NEGATIVE BASE EXCESS, VEN: ABNORMAL (ref 0–2)
NITRITE, URINE: NEGATIVE
NRBC AUTOMATED: 0 PER 100 WBC
O2 DEVICE/FLOW/%: ABNORMAL
O2 SAT, VEN: 73 %
OTHER OBSERVATIONS UA: NORMAL
PATIENT TEMP: ABNORMAL
PCO2, VEN: 47 MM HG (ref 39–55)
PDW BLD-RTO: 13.3 % (ref 11.8–14.4)
PH UA: 5.5 (ref 5–8)
PH VENOUS: 7.45 (ref 7.32–7.42)
PLATELET # BLD: 149 K/UL (ref 138–453)
PLATELET ESTIMATE: ABNORMAL
PMV BLD AUTO: 8.8 FL (ref 8.1–13.5)
PO2, VEN: 37 MM HG (ref 30–50)
POC PCO2 TEMP: ABNORMAL MM HG
POC PH TEMP: ABNORMAL
POC PO2 TEMP: ABNORMAL MM HG
POSITIVE BASE EXCESS, VEN: 9 (ref 0–2)
POTASSIUM SERPL-SCNC: 3.8 MMOL/L (ref 3.7–5.3)
PRO-BNP: 5141 PG/ML
PROCALCITONIN: 0.21 NG/ML
PROTEIN UA: NEGATIVE
RBC # BLD: 3.78 M/UL (ref 4.21–5.77)
RBC # BLD: ABNORMAL 10*6/UL
RBC UA: NORMAL /HPF (ref 0–2)
RENAL EPITHELIAL, UA: NORMAL /HPF
SARS-COV-2, RAPID: NOT DETECTED
SEG NEUTROPHILS: 89 % (ref 36–65)
SEGMENTED NEUTROPHILS ABSOLUTE COUNT: 14.33 K/UL (ref 1.5–8.1)
SODIUM BLD-SCNC: 140 MMOL/L (ref 135–144)
SPECIFIC GRAVITY UA: 1.01 (ref 1–1.03)
SPECIMEN DESCRIPTION: NORMAL
TOTAL CO2, VENOUS: 34 MMOL/L (ref 25–31)
TOTAL PROTEIN: 7.1 G/DL (ref 6.4–8.3)
TRICHOMONAS: NORMAL
TROPONIN INTERP: ABNORMAL
TROPONIN T: ABNORMAL NG/ML
TROPONIN, HIGH SENSITIVITY: 54 NG/L (ref 0–22)
TURBIDITY: CLEAR
URINE HGB: ABNORMAL
UROBILINOGEN, URINE: NORMAL
WBC # BLD: 16.1 K/UL (ref 3.5–11.3)
WBC # BLD: ABNORMAL 10*3/UL
WBC UA: NORMAL /HPF (ref 0–5)
YEAST: NORMAL

## 2021-05-01 PROCEDURE — 36415 COLL VENOUS BLD VENIPUNCTURE: CPT

## 2021-05-01 PROCEDURE — 83690 ASSAY OF LIPASE: CPT

## 2021-05-01 PROCEDURE — 87086 URINE CULTURE/COLONY COUNT: CPT

## 2021-05-01 PROCEDURE — 2060000000 HC ICU INTERMEDIATE R&B

## 2021-05-01 PROCEDURE — 94761 N-INVAS EAR/PLS OXIMETRY MLT: CPT

## 2021-05-01 PROCEDURE — 6370000000 HC RX 637 (ALT 250 FOR IP): Performed by: INTERNAL MEDICINE

## 2021-05-01 PROCEDURE — 2700000000 HC OXYGEN THERAPY PER DAY

## 2021-05-01 PROCEDURE — 6360000002 HC RX W HCPCS: Performed by: INTERNAL MEDICINE

## 2021-05-01 PROCEDURE — 85025 COMPLETE CBC W/AUTO DIFF WBC: CPT

## 2021-05-01 PROCEDURE — 99285 EMERGENCY DEPT VISIT HI MDM: CPT

## 2021-05-01 PROCEDURE — 82803 BLOOD GASES ANY COMBINATION: CPT

## 2021-05-01 PROCEDURE — 2580000003 HC RX 258: Performed by: INTERNAL MEDICINE

## 2021-05-01 PROCEDURE — 94640 AIRWAY INHALATION TREATMENT: CPT

## 2021-05-01 PROCEDURE — 71045 X-RAY EXAM CHEST 1 VIEW: CPT

## 2021-05-01 PROCEDURE — 81001 URINALYSIS AUTO W/SCOPE: CPT

## 2021-05-01 PROCEDURE — 80053 COMPREHEN METABOLIC PANEL: CPT

## 2021-05-01 PROCEDURE — 99223 1ST HOSP IP/OBS HIGH 75: CPT | Performed by: INTERNAL MEDICINE

## 2021-05-01 PROCEDURE — 84145 PROCALCITONIN (PCT): CPT

## 2021-05-01 PROCEDURE — U0003 INFECTIOUS AGENT DETECTION BY NUCLEIC ACID (DNA OR RNA); SEVERE ACUTE RESPIRATORY SYNDROME CORONAVIRUS 2 (SARS-COV-2) (CORONAVIRUS DISEASE [COVID-19]), AMPLIFIED PROBE TECHNIQUE, MAKING USE OF HIGH THROUGHPUT TECHNOLOGIES AS DESCRIBED BY CMS-2020-01-R: HCPCS

## 2021-05-01 PROCEDURE — 87040 BLOOD CULTURE FOR BACTERIA: CPT

## 2021-05-01 PROCEDURE — 83880 ASSAY OF NATRIURETIC PEPTIDE: CPT

## 2021-05-01 PROCEDURE — 6360000002 HC RX W HCPCS: Performed by: EMERGENCY MEDICINE

## 2021-05-01 PROCEDURE — 84484 ASSAY OF TROPONIN QUANT: CPT

## 2021-05-01 PROCEDURE — 2580000003 HC RX 258: Performed by: EMERGENCY MEDICINE

## 2021-05-01 PROCEDURE — U0005 INFEC AGEN DETEC AMPLI PROBE: HCPCS

## 2021-05-01 PROCEDURE — 87635 SARS-COV-2 COVID-19 AMP PRB: CPT

## 2021-05-01 RX ORDER — SODIUM CHLORIDE 9 MG/ML
INJECTION, SOLUTION INTRAVENOUS CONTINUOUS
Status: DISCONTINUED | OUTPATIENT
Start: 2021-05-01 | End: 2021-05-02

## 2021-05-01 RX ORDER — ONDANSETRON 2 MG/ML
4 INJECTION INTRAMUSCULAR; INTRAVENOUS EVERY 6 HOURS PRN
Status: DISCONTINUED | OUTPATIENT
Start: 2021-05-01 | End: 2021-05-06 | Stop reason: HOSPADM

## 2021-05-01 RX ORDER — LEVOFLOXACIN 5 MG/ML
750 INJECTION, SOLUTION INTRAVENOUS EVERY 24 HOURS
Status: DISCONTINUED | OUTPATIENT
Start: 2021-05-02 | End: 2021-05-06 | Stop reason: HOSPADM

## 2021-05-01 RX ORDER — MULTIVITAMIN WITH IRON
1 TABLET ORAL DAILY
Status: DISCONTINUED | OUTPATIENT
Start: 2021-05-01 | End: 2021-05-06 | Stop reason: HOSPADM

## 2021-05-01 RX ORDER — POLYVINYL ALCOHOL 14 MG/ML
1 SOLUTION/ DROPS OPHTHALMIC 4 TIMES DAILY
Status: DISCONTINUED | OUTPATIENT
Start: 2021-05-01 | End: 2021-05-06 | Stop reason: HOSPADM

## 2021-05-01 RX ORDER — SODIUM CHLORIDE 0.9 % (FLUSH) 0.9 %
5-40 SYRINGE (ML) INJECTION EVERY 12 HOURS SCHEDULED
Status: DISCONTINUED | OUTPATIENT
Start: 2021-05-01 | End: 2021-05-06 | Stop reason: HOSPADM

## 2021-05-01 RX ORDER — ALBUTEROL SULFATE 2.5 MG/3ML
2.5 SOLUTION RESPIRATORY (INHALATION)
Status: DISCONTINUED | OUTPATIENT
Start: 2021-05-01 | End: 2021-05-06 | Stop reason: HOSPADM

## 2021-05-01 RX ORDER — SODIUM CHLORIDE 9 MG/ML
25 INJECTION, SOLUTION INTRAVENOUS PRN
Status: DISCONTINUED | OUTPATIENT
Start: 2021-05-01 | End: 2021-05-06 | Stop reason: HOSPADM

## 2021-05-01 RX ORDER — FLUTICASONE PROPIONATE 50 MCG
2 SPRAY, SUSPENSION (ML) NASAL DAILY
Status: DISCONTINUED | OUTPATIENT
Start: 2021-05-01 | End: 2021-05-06 | Stop reason: HOSPADM

## 2021-05-01 RX ORDER — ACETAMINOPHEN 325 MG/1
650 TABLET ORAL EVERY 6 HOURS PRN
Status: DISCONTINUED | OUTPATIENT
Start: 2021-05-01 | End: 2021-05-06 | Stop reason: HOSPADM

## 2021-05-01 RX ORDER — NICOTINE 21 MG/24HR
1 PATCH, TRANSDERMAL 24 HOURS TRANSDERMAL DAILY PRN
Status: DISCONTINUED | OUTPATIENT
Start: 2021-05-01 | End: 2021-05-06 | Stop reason: HOSPADM

## 2021-05-01 RX ORDER — BUDESONIDE AND FORMOTEROL FUMARATE DIHYDRATE 160; 4.5 UG/1; UG/1
2 AEROSOL RESPIRATORY (INHALATION) 2 TIMES DAILY
Status: DISCONTINUED | OUTPATIENT
Start: 2021-05-01 | End: 2021-05-06 | Stop reason: HOSPADM

## 2021-05-01 RX ORDER — GABAPENTIN 300 MG/1
300 CAPSULE ORAL 3 TIMES DAILY
Status: DISCONTINUED | OUTPATIENT
Start: 2021-05-01 | End: 2021-05-06 | Stop reason: HOSPADM

## 2021-05-01 RX ORDER — LANOLIN ALCOHOL/MO/W.PET/CERES
325 CREAM (GRAM) TOPICAL 2 TIMES DAILY WITH MEALS
Status: DISCONTINUED | OUTPATIENT
Start: 2021-05-01 | End: 2021-05-06 | Stop reason: HOSPADM

## 2021-05-01 RX ORDER — ACETAMINOPHEN 650 MG/1
650 SUPPOSITORY RECTAL EVERY 6 HOURS PRN
Status: DISCONTINUED | OUTPATIENT
Start: 2021-05-01 | End: 2021-05-06 | Stop reason: HOSPADM

## 2021-05-01 RX ORDER — IPRATROPIUM BROMIDE AND ALBUTEROL SULFATE 2.5; .5 MG/3ML; MG/3ML
1 SOLUTION RESPIRATORY (INHALATION)
Status: DISCONTINUED | OUTPATIENT
Start: 2021-05-01 | End: 2021-05-06 | Stop reason: HOSPADM

## 2021-05-01 RX ORDER — IPRATROPIUM BROMIDE AND ALBUTEROL SULFATE 2.5; .5 MG/3ML; MG/3ML
1 SOLUTION RESPIRATORY (INHALATION) 4 TIMES DAILY
Status: DISCONTINUED | OUTPATIENT
Start: 2021-05-01 | End: 2021-05-01 | Stop reason: SDUPTHER

## 2021-05-01 RX ORDER — BUMETANIDE 1 MG/1
1 TABLET ORAL DAILY
Status: DISCONTINUED | OUTPATIENT
Start: 2021-05-01 | End: 2021-05-05

## 2021-05-01 RX ORDER — BENZONATATE 100 MG/1
100 CAPSULE ORAL 3 TIMES DAILY PRN
Status: DISCONTINUED | OUTPATIENT
Start: 2021-05-01 | End: 2021-05-06 | Stop reason: HOSPADM

## 2021-05-01 RX ORDER — LEVOFLOXACIN 5 MG/ML
750 INJECTION, SOLUTION INTRAVENOUS ONCE
Status: COMPLETED | OUTPATIENT
Start: 2021-05-01 | End: 2021-05-01

## 2021-05-01 RX ORDER — PANTOPRAZOLE SODIUM 40 MG/1
40 TABLET, DELAYED RELEASE ORAL DAILY
Status: DISCONTINUED | OUTPATIENT
Start: 2021-05-01 | End: 2021-05-06 | Stop reason: HOSPADM

## 2021-05-01 RX ORDER — UBIDECARENONE 75 MG
100 CAPSULE ORAL DAILY
Status: DISCONTINUED | OUTPATIENT
Start: 2021-05-01 | End: 2021-05-06 | Stop reason: HOSPADM

## 2021-05-01 RX ORDER — TAMSULOSIN HYDROCHLORIDE 0.4 MG/1
0.4 CAPSULE ORAL DAILY
Status: DISCONTINUED | OUTPATIENT
Start: 2021-05-01 | End: 2021-05-06 | Stop reason: HOSPADM

## 2021-05-01 RX ORDER — CETIRIZINE HYDROCHLORIDE 5 MG/1
5 TABLET ORAL DAILY
Status: DISCONTINUED | OUTPATIENT
Start: 2021-05-01 | End: 2021-05-06 | Stop reason: HOSPADM

## 2021-05-01 RX ORDER — METOPROLOL SUCCINATE 50 MG/1
50 TABLET, EXTENDED RELEASE ORAL NIGHTLY
Status: DISCONTINUED | OUTPATIENT
Start: 2021-05-01 | End: 2021-05-06 | Stop reason: HOSPADM

## 2021-05-01 RX ORDER — PROMETHAZINE HYDROCHLORIDE 12.5 MG/1
12.5 TABLET ORAL EVERY 6 HOURS PRN
Status: DISCONTINUED | OUTPATIENT
Start: 2021-05-01 | End: 2021-05-06 | Stop reason: HOSPADM

## 2021-05-01 RX ORDER — ROPINIROLE 0.25 MG/1
0.25 TABLET, FILM COATED ORAL 2 TIMES DAILY
Status: DISCONTINUED | OUTPATIENT
Start: 2021-05-01 | End: 2021-05-06 | Stop reason: HOSPADM

## 2021-05-01 RX ORDER — CARBOXYMETHYLCELLULOSE SODIUM 5 MG/ML
1 SOLUTION/ DROPS OPHTHALMIC 4 TIMES DAILY
Status: DISCONTINUED | OUTPATIENT
Start: 2021-05-01 | End: 2021-05-01 | Stop reason: SDUPTHER

## 2021-05-01 RX ORDER — ARMODAFINIL 200 MG/1
1 TABLET ORAL 2 TIMES DAILY
Status: DISCONTINUED | OUTPATIENT
Start: 2021-05-02 | End: 2021-05-06 | Stop reason: HOSPADM

## 2021-05-01 RX ORDER — TIZANIDINE 4 MG/1
4 TABLET ORAL 2 TIMES DAILY PRN
Status: DISCONTINUED | OUTPATIENT
Start: 2021-05-01 | End: 2021-05-06 | Stop reason: HOSPADM

## 2021-05-01 RX ORDER — ASCORBIC ACID 500 MG
500 TABLET ORAL DAILY
Status: DISCONTINUED | OUTPATIENT
Start: 2021-05-01 | End: 2021-05-06 | Stop reason: HOSPADM

## 2021-05-01 RX ORDER — 0.9 % SODIUM CHLORIDE 0.9 %
1000 INTRAVENOUS SOLUTION INTRAVENOUS ONCE
Status: COMPLETED | OUTPATIENT
Start: 2021-05-01 | End: 2021-05-01

## 2021-05-01 RX ORDER — POTASSIUM CHLORIDE 20 MEQ/1
20 TABLET, EXTENDED RELEASE ORAL 2 TIMES DAILY
Status: DISCONTINUED | OUTPATIENT
Start: 2021-05-01 | End: 2021-05-06 | Stop reason: HOSPADM

## 2021-05-01 RX ORDER — SODIUM CHLORIDE 0.9 % (FLUSH) 0.9 %
10 SYRINGE (ML) INJECTION PRN
Status: DISCONTINUED | OUTPATIENT
Start: 2021-05-01 | End: 2021-05-06 | Stop reason: HOSPADM

## 2021-05-01 RX ADMIN — METOPROLOL SUCCINATE 50 MG: 50 TABLET, EXTENDED RELEASE ORAL at 21:28

## 2021-05-01 RX ADMIN — BUDESONIDE AND FORMOTEROL FUMARATE DIHYDRATE 2 PUFF: 160; 4.5 AEROSOL RESPIRATORY (INHALATION) at 20:25

## 2021-05-01 RX ADMIN — POTASSIUM CHLORIDE 20 MEQ: 1500 TABLET, EXTENDED RELEASE ORAL at 18:30

## 2021-05-01 RX ADMIN — MULTIVITAMIN TABLET 1 TABLET: TABLET at 18:29

## 2021-05-01 RX ADMIN — SODIUM CHLORIDE 1000 ML: 9 INJECTION, SOLUTION INTRAVENOUS at 14:30

## 2021-05-01 RX ADMIN — CETIRIZINE HYDROCHLORIDE 5 MG: 5 TABLET, FILM COATED ORAL at 18:28

## 2021-05-01 RX ADMIN — ROPINIROLE HYDROCHLORIDE 0.25 MG: 0.25 TABLET, FILM COATED ORAL at 21:20

## 2021-05-01 RX ADMIN — OXYCODONE HYDROCHLORIDE AND ACETAMINOPHEN 500 MG: 500 TABLET ORAL at 18:28

## 2021-05-01 RX ADMIN — PANTOPRAZOLE SODIUM 40 MG: 40 TABLET, DELAYED RELEASE ORAL at 18:30

## 2021-05-01 RX ADMIN — VITAM B12 100 MCG: 100 TAB at 18:30

## 2021-05-01 RX ADMIN — ACETAMINOPHEN 650 MG: 325 TABLET ORAL at 21:34

## 2021-05-01 RX ADMIN — POLYVINYL ALCOHOL 1 DROP: 14 SOLUTION/ DROPS OPHTHALMIC at 21:28

## 2021-05-01 RX ADMIN — GABAPENTIN 300 MG: 300 CAPSULE ORAL at 21:20

## 2021-05-01 RX ADMIN — LEVOFLOXACIN 750 MG: 5 INJECTION, SOLUTION INTRAVENOUS at 16:04

## 2021-05-01 RX ADMIN — ENOXAPARIN SODIUM 40 MG: 40 INJECTION SUBCUTANEOUS at 18:29

## 2021-05-01 RX ADMIN — FERROUS SULFATE TAB EC 325 MG (65 MG FE EQUIVALENT) 325 MG: 325 (65 FE) TABLET DELAYED RESPONSE at 18:28

## 2021-05-01 RX ADMIN — FLUTICASONE PROPIONATE 2 SPRAY: 50 SPRAY, METERED NASAL at 18:29

## 2021-05-01 RX ADMIN — SODIUM CHLORIDE: 9 INJECTION, SOLUTION INTRAVENOUS at 18:23

## 2021-05-01 RX ADMIN — TIZANIDINE 4 MG: 4 TABLET ORAL at 21:28

## 2021-05-01 RX ADMIN — TAMSULOSIN HYDROCHLORIDE 0.4 MG: 0.4 CAPSULE ORAL at 21:20

## 2021-05-01 ASSESSMENT — ENCOUNTER SYMPTOMS
VOMITING: 0
ABDOMINAL PAIN: 1
NAUSEA: 0
DIARRHEA: 0
SHORTNESS OF BREATH: 1
COUGH: 1

## 2021-05-01 ASSESSMENT — PAIN DESCRIPTION - FREQUENCY: FREQUENCY: CONTINUOUS

## 2021-05-01 ASSESSMENT — PAIN DESCRIPTION - DESCRIPTORS
DESCRIPTORS: ACHING;CONSTANT
DESCRIPTORS: ACHING

## 2021-05-01 NOTE — ED PROVIDER NOTES
[address] [place] [address]  Emergency Medicine Department    Pt Name: Sinai Vicente  MRN: [id number]  [place] 1945  Date of evaluation: 5/1/2021  Provider: Luz Michael MD    CHIEF COMPLAINT     Chief Complaint   Patient presents with    Fever     onset last night    Shortness of Breath    Nausea & Vomiting       HISTORY OF PRESENT ILLNESS  (Location/Symptom, Timing/Onset, Context/Setting,Quality, Duration, Modifying Factors, Severity.)   Sinai Vicente is a 77 y.o. male who presents to the emergency department complaining of fever, chills, shortness of breath, and lower abdominal \"burning pain\". He has been feeling this way for the past 30 hours. He has had no vomiting. He reports he has not been able to get out of bed because of the fever and chills. He was found at home to be hypoxic to 88%. He has never been diagnosed with Covid and has not and does not plan on getting the Covid vaccine as he does not trust it. Nursing Notes were reviewed.     ALLERGIES     Darvocet [propoxyphene n-acetaminophen], Other, Oxycodone-acetaminophen, and Propoxyphene    CURRENT MEDICATIONS       Current Discharge Medication List      CONTINUE these medications which have NOT CHANGED    Details   tiZANidine (ZANAFLEX) 4 MG tablet TAKE 1 TABLET BY MOUTH DAILY AS NEEDED  Qty: 28 tablet, Refills: 3    Associated Diagnoses: Myalgia      loratadine (CLARITIN) 10 MG tablet Take 1 tablet by mouth daily  Qty: 90 tablet, Refills: 1    Associated Diagnoses: Seasonal allergies      metoprolol succinate (TOPROL XL) 50 MG extended release tablet Take 1 tablet by mouth nightly  Qty: 30 tablet, Refills: 3      bumetanide (BUMEX) 1 MG tablet Take 1 mg by mouth daily Take 2 mg of Bumex in morning, 1 mg of Bumex in afternoon, take 1 mg in evening if weight is >3 above baseline weight      albuterol sulfate HFA (VENTOLIN HFA) 108 (90 Base) MCG/ACT inhaler Inhale 2 puffs into the lungs every 6 hours as needed for Wheezing or Shortness of Breath      benzonatate (TESSALON) 100 MG capsule TAKE 1 CAPSULE BY MOUTH THREE TIMES DAILY AS NEEDED FOR COUGH  Qty: 30 capsule, Refills: 3    Associated Diagnoses: Sinus congestion      gabapentin (NEURONTIN) 300 MG capsule TAKE 1 CAPSULE BY MOUTH THREE TIMES DAILY  Qty: 84 capsule, Refills: 2    Associated Diagnoses: Medication refill; Neuropathy      vitamin B-12 (CYANOCOBALAMIN) 100 MCG tablet TAKE 1 TABLET BY MOUTH DAILY AS NEEDED FOR FATIGUE  Qty: 30 tablet, Refills: 5    Associated Diagnoses: Fatigue, unspecified type      tamsulosin (FLOMAX) 0.4 MG capsule Take 1 capsule by mouth daily  Qty: 30 capsule, Refills: 5    Associated Diagnoses: Benign prostatic hyperplasia with urinary hesitancy;  Medication refill      pantoprazole (PROTONIX) 40 MG tablet TAKE 1 TABLET BY MOUTH DAILY  Qty: 28 tablet, Refills: 3    Associated Diagnoses: Medication refill; Gastroesophageal reflux disease without esophagitis      CREON 86135-42141 units delayed release capsule TAKE 1 CAPSULE BY MOUTH THREE TIMES DAILY WITH MEALS  Qty: 84 capsule, Refills: 3    Associated Diagnoses: Medication refill; Pancreatic insufficiency      rOPINIRole (REQUIP) 0.25 MG tablet TAKE 1 TABLET BY MOUTH TWICE DAILY  Qty: 56 tablet, Refills: 3    Associated Diagnoses: Medication refill; RLS (restless legs syndrome)      carboxymethylcellulose (REFRESH PLUS) 0.5 % SOLN ophthalmic solution Apply 1 drop to eye 4 times daily      Menthol-Methyl Salicylate (THERA-GESIC) 0.5-15 % CREA Apply topically 2 times daily as needed      potassium chloride (KLOR-CON M) 20 MEQ extended release tablet Take 1 tablet by mouth 2 times daily  Qty: 56 tablet, Refills: 5    Associated Diagnoses: Medication refill      Multiple Vitamin (MULTI-VITAMINS) TABS Take 1 tablet by mouth daily  Qty: 28 tablet, Refills: 5    Associated Diagnoses: Medication refill      fluticasone (FLONASE) 50 MCG/ACT nasal spray 2 sprays by Nasal route daily  Qty: 16 g, Refills: 3 Associated Diagnoses: Medication refill; Nasal congestion      ascorbic acid (VITAMIN C) 500 MG tablet Take 1 tablet by mouth daily  Qty: 30 tablet, Refills: 2      ferrous sulfate (FE TABS 325) 325 (65 Fe) MG EC tablet Take 1 tablet by mouth 2 times daily (with meals)  Qty: 90 tablet, Refills: 3      SM LUBRICANT EYE DROPS 0.4-0.3 % ophthalmic solution PLACE 1 DROP INTO BOTH EYES FOUR TIMES DAILY AS NEEDED FOR DRY EYES  Qty: 15 mL, Refills: 3    Associated Diagnoses: Medication refill      Fluticasone furoate-vilanterol (BREO ELLIPTA) 200-25 MCG/INH AEPB inhaler Inhale 1 puff into the lungs daily      Handicap Placard MISC by Does not apply route  Qty: 1 each, Refills: 0    Associated Diagnoses: Bilateral foot-drop; Gait disorder; Spondylosis of lumbar region without myelopathy or radiculopathy      Incontinence Supply Disposable (INCONTINENCE BRIEF LARGE) MISC To use as directed. Use 3x a day  Qty: 90 each, Refills: 3    Associated Diagnoses: Neurogenic bladder; Overflow incontinence      Foot Care Products (TRI-BALANCE ORTHOTICS MENS) MISC Provide insurance covered orthotics shoes, wear daily  Qty: 2 each, Refills: 0    Associated Diagnoses: Bilateral foot-drop      ipratropium-albuterol (DUONEB) 0.5-2.5 (3) MG/3ML SOLN nebulizer solution Inhale 1 vial into the lungs every 4 hours as needed       Armodafinil 200 MG TABS Take 1 tablet by mouth 2 times daily.               PAST MEDICAL HISTORY         Diagnosis Date    Acute superficial gastritis without hemorrhage 05/26/2018    Anastomotic stricture of stomach     Asthma     Atrial fibrillation ([facility]     On Xarelto 6/27/2020    Calculus of gallbladder without cholecystitis without obstruction 05/02/2017    Chronic diastolic heart failure ([facility] 11/17/2017    Chronic rhinosinusitis 04/13/2015    Class 2 severe obesity due to excess calories with serious comorbidity and body mass index (BMI) of 36.0 to 36.9 in adult [facility]) 11/17/2017    COPD (chronic obstructive pulmonary disease) ([facility]     E. coli septicemia ([facility]     E. coli UTI     Foot drop, right 07/10/2012    Fracture of femur ([facility] 10/23/2016    Gait disorder 07/20/2016    Gastric out let obstruction     GERD (gastroesophageal reflux disease)     Hallux valgus, acquired, bilateral 06/24/2015    Hyperlipidemia     Hypertension     Impaired hearing 04/13/2015    Internal hemorrhoids 01/03/2017    Lymphedema of both lower extremities 06/24/2015    Nausea & vomiting 11/16/2018    OA (osteoarthritis) of knee, bilateral 12/11/2006    Obesity     MARIAMA on CPAP 05/02/2017    Osteoarthritis     Osteoarthritis of lumbar spine 12/13/2007     replace inactive diagnosis    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 coli (HCC)     Due to UTI     Sick sinus syndrome (HCC)     Simple chronic bronchitis ([facility] 04/10/2018    Slow transit constipation 11/03/2016    Unspecified sleep apnea     UTI (urinary tract infection)        SURGICAL HISTORY           Procedure Laterality Date    CARPAL TUNNEL RELEASE      bilateral    COLONOSCOPY      COLONOSCOPY N/A 04/05/2021    COLONOSCOPY DIAGNOSTIC performed by Lizbeth Lawrence MD at [address]  01/02/2019    by Dr. Christelle Navarro N/A 01/02/2019    CYSTOSCOPY performed by Steph Viera MD at [address]    first knuckle right index finger   [address]    vertical banded gastroplasty   [address] REPLACEMENT  2004 & 2005    bilateral knees    PACEMAKER INSERTION  04/09/2021    [place], placed by Dr. Casas Line EGD [address] N/A 08/16/2018    EGD FOREIGN BODY REMOVAL performed by Jimmy Blue MD at [address] EGD TRANSORAL BIOPSY SINGLE/MULTIPLE N/A 05/25/2018    EGD BIOPSY performed by Rowena Gupta DO at [address] [facility]      left shoulder    UPPER GASTROINTESTINAL ENDOSCOPY  08/13/2018 negative. PHYSICAL EXAM    (up to 7 for level 4, 8 or more for level 5)     ED Triage Vitals [05/01/21 1355]   BP Temp Temp Source Pulse Resp SpO2 Height Weight   (!) 101/57 100.9 °F (38.3 °C) Oral 74 18 90 % 5' 10\" (1.778 m) 207 lb (93.9 kg)       Physical Exam  Vitals signs and nursing note reviewed. Constitutional:       General: He is not in acute distress. Appearance: He is well-developed. He is not diaphoretic. HENT:      Head: Normocephalic and atraumatic. Neck:      Musculoskeletal: Normal range of motion and neck supple. Cardiovascular:      Rate and Rhythm: Normal rate and regular rhythm. Heart sounds: Normal heart sounds. No murmur. Pulmonary:      Effort: Pulmonary effort is normal. No respiratory distress. Breath sounds: Normal breath sounds. Abdominal:      Palpations: Abdomen is soft. Tenderness: There is no abdominal tenderness. Musculoskeletal:         General: No tenderness or deformity. Right lower leg: Edema (chronic Left>Right) present. Comments: Bilateral lower extremities in braces   Skin:     General: Skin is warm and dry. Neurological:      Mental Status: He is alert and oriented to person, place, and time. Psychiatric:         Behavior: Behavior normal.         Thought Content: Thought content normal.         Judgment: Judgment normal.         DIAGNOSTIC RESULTS     RADIOLOGY:   Non-plain film images such as CT, Ultrasound and MRI are read by theradiologist. Plain radiographic images are visualized and preliminarily interpreted by the emergency physician with the below findings:    Interpretation per the Radiologist below, if available at the time of this note:    XR CHEST PORTABLE   Final Result   Right upper lung field opacity appears more prominent, otherwise no   significant change. XR CHEST PORTABLE   Final Result   Patchy bilateral airspace disease suggestive of multifocal inflammatory   process or infection.              ED BEDSIDE ULTRASOUND:   Performed by ED Physician - none    LABS:  Labs Reviewed   CBC WITH AUTO DIFFERENTIAL - Abnormal; Notable for the following components:       Result Value    WBC 16.1 (*)     RBC 3.78 (*)     Hemoglobin 12.1 (*)     Hematocrit 38.0 (*)     Seg Neutrophils 89 (*)     Lymphocytes 4 (*)     Eosinophils % 0 (*)     Immature Granulocytes 1 (*)     Segs Absolute 14.33 (*)     Absolute Lymph # 0.64 (*)     All other components within normal limits   COMPREHENSIVE METABOLIC PANEL W/ REFLEX TO MG FOR LOW K - Abnormal; Notable for the following components:    Glucose 116 (*)     Alkaline Phosphatase 136 (*)     AST 46 (*)     Total Bilirubin 1.45 (*)     All other components within normal limits   LIPASE - Abnormal; Notable for the following components:    Lipase 8 (*)     All other components within normal limits   TROPONIN - Abnormal; Notable for the following components:    Troponin, High Sensitivity 54 (*)     All other components within normal limits   BRAIN NATRIURETIC PEPTIDE - Abnormal; Notable for the following components:    Pro-BNP 5,141 (*)     All other components within normal limits   URINALYSIS - Abnormal; Notable for the following components:    Urine Hgb 1+ (*)     All other components within normal limits   POC PANEL (G3)-DAVONTE - Abnormal; Notable for the following components:    pH, [place] 7.45 (*)     Total CO2, Venous 34 (*)     HCO3, Venous 32.7 (*)     Positive Base Excess, [place] 9 (*)     All other components within normal limits   PROCALCITONIN - Abnormal; Notable for the following components:    Procalcitonin 0.21 (*)     All other components within normal limits   BASIC METABOLIC PANEL W/ REFLEX TO MG FOR LOW K - Abnormal; Notable for the following components:    Glucose 107 (*)     Anion Gap 7 (*)     All other components within normal limits   CBC - Abnormal; Notable for the following components:    RBC 3.20 (*)     Hemoglobin 10.1 (*)     Hematocrit 32.2 (*)     Platelets 937 (*) All other components within normal limits   COVID-19, RAPID   CULTURE, BLOOD 1   CULTURE, BLOOD 2   CULTURE, URINE   GRAM STAIN   CULTURE, RESPIRATORY   CULTURE, BLOOD 2   MICROSCOPIC URINALYSIS   COVID-19     All other labs were within normal range or not returned as of this dictation. EMERGENCYDEPARTMENT COURSE and DIFFERENTIAL DIAGNOSIS/MDM:   Vitals:    Vitals:    05/02/21 0014 05/02/21 0403 05/02/21 0802 05/02/21 0917   BP: (!) 93/49 (!) 90/56 (!) 96/54    Pulse: 70 70 70    Resp: 16 16 18 16   Temp: 98.2 °F (36.8 °C) 98.1 °F (36.7 °C) 98.1 °F (36.7 °C)    TempSrc: Oral Oral Oral    SpO2: 100% 100% 100% 100%   Weight:  204 lb 5 oz (92.7 kg)     Height:         71-year-old male presenting with shortness of breath and fevers concerning for infection. Chest x-ray does show bilateral patchy infiltrates concerning for a viral pneumonia. White count is elevated which is uncommon for Covid. A rapid Covid is also negative. We will send a Covid PCR for confirmatory testing. We will also treat empirically with IV antibiotics. Patient is comfortable on supplemental oxygen by nasal cannula. Given his hypoxia, will require admission for further care. CONSULTS:  IP CONSULT TO INTERNAL MEDICINE    PROCEDURES:  None indicated    FINAL IMPRESSION     1. Pneumonia due to organism          DISPOSITION/PLAN   DISPOSITION      PATIENT REFERRED TO:   No follow-up provider specified.   DISCHARGE MEDICATIONS:     Current Discharge Medication List        (Please note that portions of this note were completed with a voice recognition program.  Efforts were made to edit the dictations butoccasionally words are mis-transcribed.)    Hernando Rockwell MD  Attending Emergency Physician         Hernando Rockwell MD  05/02/21 [phone]

## 2021-05-01 NOTE — H&P
[facility]  Office: [address], DO, Lashawn Chavez, DO, Aubree Morse, DO, Brunilda Aleman Blood, DO, Jennifer Bailey MD, Asael Briggs MD, Lisa Coombs MD, Samia Nolan MD, Nohelia Michaels MD, Blake Perry MD, Alphonse Gonzalez MD, Kacey Robles MD, Cris Vasquez, DO, Fawn Marie MD, Faith Dee, DO, Mainor Zacarias MD,  Neisha Garibay, DO, Charan Ellington MD, Luna Cruz MD, Benito Hendricks MD, Marco Shaikh MD, Aubree Lam, [facility], [facility], CNP, Tasia Lebron, CNP, Priyank Guzman, CNS, Gabe Bella, CNP, Grace Rowe, CNP, Baldemar Bennett, CNP, Aixa Canela, CNP, Mirza Grajeda, CNP, Carmelo Sandhu PA-C, Sarah Disla, [facility], Jing Cota, CNP, Zohra Abarca, [facility], Fredi Solano, CNP, Randee Gibbs, CNP, Flaquito Sharp, CNP, Clau Ch, [facility]    HISTORY AND PHYSICAL EXAMINATION            Date:   5/1/2021  Patient name:  Kathy Howard  Date of admission:  5/1/2021  1:48 PM  MRN:   [id number]  Account:  [de-identified]  YOB: 1945  PCP:    JULIAN Caraballo CNP  Room:   [place] [address]  Code Status:    Prior    Chief Complaint:     Chief Complaint   Patient presents with    Fever     onset last night    Shortness of Breath    Nausea & Vomiting       History Obtained From:     patient, electronic medical record    History of Present Illness:   Admitted through ER with following information:  Kathy Howard is a 77 y.o. male who presents to the emergency department complaining of fever, chills, shortness of breath, and lower abdominal \"burning pain\". He has been feeling this way for the past 30 hours. He has had no vomiting. He reports he has not been able to get out of bed because of the fever and chills. He was found at home to be hypoxic to 88%. He has never been diagnosed with Covid and has not and does not plan on getting the Covid vaccine as he does not trust it.     He had by Farrah Leyva MD at [address] [place] [address] Rd N/A 11/19/2018    EGD DILATION BALLOON performed by Farrah Leyva MD at [address] [place] [address] Rd N/A 10/15/2020    EGD SUBMUCOSAL/BOTOX INJECTION tattoo  performed by Janell Mike MD at [facility]        Medications Prior to Admission:     Prior to Admission medications    Medication Sig Start Date End Date Taking?  Authorizing Provider   tiZANidine (ZANAFLEX) 4 MG tablet TAKE 1 TABLET BY MOUTH DAILY AS NEEDED 4/30/21   JULIAN Carrillo CNP   loratadine (CLARITIN) 10 MG tablet Take 1 tablet by mouth daily 4/27/21   JULIAN Carrillo CNP   metoprolol succinate (TOPROL XL) 50 MG extended release tablet Take 1 tablet by mouth nightly 4/14/21   Quinten Corral MD   bumetanide (BUMEX) 1 MG tablet Take 1 mg by mouth daily Take 2 mg of Bumex in morning, 1 mg of Bumex in afternoon, take 1 mg in evening if weight is >3 above baseline weight    Historical Provider, MD   albuterol sulfate HFA (VENTOLIN HFA) 108 (90 Base) MCG/ACT inhaler Inhale 2 puffs into the lungs every 6 hours as needed for Wheezing or Shortness of Breath    Historical Provider, MD   benzonatate (TESSALON) 100 MG capsule TAKE 1 CAPSULE BY MOUTH THREE TIMES DAILY AS NEEDED FOR COUGH 3/31/21   Rubina Kendall PA-C   gabapentin (NEURONTIN) 300 MG capsule TAKE 1 CAPSULE BY MOUTH THREE TIMES DAILY 3/31/21 6/29/21  Rubina Kendall PA-C   vitamin B-12 (CYANOCOBALAMIN) 100 MCG tablet TAKE 1 TABLET BY MOUTH DAILY AS NEEDED FOR FATIGUE 3/27/21   Rubina Kendall PA-C   tamsulosin (FLOMAX) 0.4 MG capsule Take 1 capsule by mouth daily 3/12/21   Rubina Kendall PA-C   pantoprazole (PROTONIX) 40 MG tablet TAKE 1 TABLET BY MOUTH DAILY 2/3/21   Rubina Kendall PA-C   CREON 01137-78571 units delayed release capsule TAKE 1 CAPSULE BY MOUTH THREE TIMES DAILY WITH MEALS 2/3/21   Rubina Kendall PA-C   rOPINIRole (REQUIP) 0.25 MG tablet Propoxyphene    Social History:     Tobacco:    reports that he quit smoking about 44 years ago. He has a 20.00 pack-year smoking history. He has never used smokeless tobacco.  Alcohol:      reports no history of alcohol use. Drug Use:  reports no history of drug use. Family History:     Family History   Problem Relation Age of Onset    Cancer Mother     Heart Attack Father        Review of Systems:     Positive and Negative as described in HPI. CONSTITUTIONAL:  + for fevers, chills, sweats, fatigue,  HEENT:  negative for vision, hearing changes, runny nose, throat pain  RESPIRATORY:  + for shortness of breath, cough, congestion, wheezing  CARDIOVASCULAR:  negative for chest pain, palpitations  GASTROINTESTINAL:  negative for nausea, vomiting, diarrhea, constipation, change in bowel habits, abdominal pain,+ heartburn  GENITOURINARY:  negative for difficulty of urination, burning with urination, frequency   INTEGUMENT:  negative for rash, skin lesions, easy bruising   HEMATOLOGIC/LYMPHATIC:  negative for swelling/edema   ALLERGIC/IMMUNOLOGIC:  negative for urticaria , itching  ENDOCRINE:  negative increase in drinking, increase in urination, hot or cold intolerance  MUSCULOSKELETAL:  negative joint pains, muscle aches, swelling of joints,+ uses braces to walk  NEUROLOGICAL:  negative for headaches, dizziness, lightheadedness, numbness, pain, tingling extremities  BEHAVIOR/PSYCH:  negative for depression, anxiety    Physical Exam:   BP (!) 101/57   Pulse 74   Temp 98.4 °F (36.9 °C)   Resp 18   Ht 5' 10\" (1.778 m)   Wt 207 lb (93.9 kg)   SpO2 90%   BMI 29.70 kg/m²   Temp (24hrs), Av.7 °F (37.6 °C), Min:98.4 °F (36.9 °C), Max:100.9 °F (38.3 °C)    No results for input(s): POCGLU in the last 72 hours.   No intake or output data in the 24 hours ending 21 1713    General Appearance:  alert, sick looking, and in mild distress  Mental status: oriented to person, place, and time  Head:  normocephalic, atraumatic  Eye: no icterus, redness, pupils equal and reactive, extraocular eye movements intact, conjunctiva clear  Ear: normal external ear, no discharge, hearing intact  Nose:  no drainage noted  Mouth: mucous membranes moist  Neck: supple, no carotid bruits, thyroid not palpable  Lungs: Prolonged expiratory phase, + bibasilar Rales, occasional wheezing   cardiovascular: normal rate,Irregular rhythm, no murmur, gallop, rub.,+ Pacemaker  Abdomen: Soft, nontender, nondistended, normal bowel sounds, no hepatomegaly or splenomegaly  Neurologic: There are no new focal motor or sensory deficits, normal muscle tone and bulk, no abnormal sensation, normal speech, cranial nerves II through XII grossly intact  Skin: No gross lesions, rashes, bruising or bleeding on exposed skin area  Extremities:  + Braces in place  lower extremities, peripheral pulses palpable, no pedal edema or calf pain with palpation  Psych: normal affect     Investigations:      Laboratory Testing:  Recent Results (from the past 24 hour(s))   CBC Auto Differential    Collection Time: 05/01/21  2:30 PM   Result Value Ref Range    WBC 16.1 (H) 3.5 - 11.3 k/uL    RBC 3.78 (L) 4.21 - 5.77 m/uL    Hemoglobin 12.1 (L) 13.0 - 17.0 g/dL    Hematocrit 38.0 (L) 40.7 - 50.3 %    .5 82.6 - 102.9 fL    MCH 32.0 25.2 - 33.5 pg    MCHC 31.8 28.4 - 34.8 g/dL    RDW 13.3 11.8 - 14.4 %    Platelets 528 896 - 376 k/uL    MPV 8.8 8.1 - 13.5 fL    NRBC Automated 0.0 0.0 per 100 WBC    Differential Type NOT REPORTED     WBC Morphology NOT REPORTED     RBC Morphology NOT REPORTED     Platelet Estimate NOT REPORTED     Seg Neutrophils 89 (H) 36 - 65 %    Lymphocytes 4 (L) 24 - 43 %    Monocytes 6 3 - 12 %    Eosinophils % 0 (L) 1 - 4 %    Basophils 0 0 - 2 %    Immature Granulocytes 1 (H) 0 %    Segs Absolute 14.33 (H) 1.50 - 8.10 k/uL    Absolute Lymph # 0.64 (L) 1.10 - 3.70 k/uL    Absolute Mono # 0.97 0.10 - 1.20 k/uL    Absolute Eos # 0.00 0.00 - 0.44 k/uL Basophils Absolute 0.00 0.00 - 0.20 k/uL    Absolute Immature Granulocyte 0.16 0.00 - 0.30 k/uL   Comprehensive Metabolic Panel w/ Reflex to MG    Collection Time: 05/01/21  2:30 PM   Result Value Ref Range    Glucose 116 (H) 70 - 99 mg/dL    BUN 17 8 - 23 mg/dL    CREATININE 1.00 0.70 - 1.20 mg/dL    Bun/Cre Ratio 17 9 - 20    Calcium 9.2 8.6 - 10.4 mg/dL    Sodium 140 135 - 144 mmol/L    Potassium 3.8 3.7 - 5.3 mmol/L    Chloride 100 98 - 107 mmol/L    CO2 27 20 - 31 mmol/L    Anion Gap 13 9 - 17 mmol/L    Alkaline Phosphatase 136 (H) 40 - 129 U/L    ALT 17 5 - 41 U/L    AST 46 (H) <40 U/L    Total Bilirubin 1.45 (H) 0.3 - 1.2 mg/dL    Total Protein 7.1 6.4 - 8.3 g/dL    Albumin 4.0 3.5 - 5.2 g/dL    Albumin/Globulin Ratio NOT REPORTED 1.0 - 2.5    GFR Non-African American >60 >60 mL/min    GFR African American >60 >60 mL/min    GFR Comment          GFR Staging NOT REPORTED    Lipase    Collection Time: 05/01/21  2:30 PM   Result Value Ref Range    Lipase 8 (L) 13 - 60 U/L   Troponin    Collection Time: 05/01/21  2:30 PM   Result Value Ref Range    Troponin, High Sensitivity 54 (HH) 0 - 22 ng/L    Troponin T NOT REPORTED <0.03 ng/mL    Troponin Interp NOT REPORTED    Brain Natriuretic Peptide    Collection Time: 05/01/21  2:30 PM   Result Value Ref Range    Pro-BNP 5,141 (H) <300 pg/mL    BNP Interpretation Pro-BNP Reference Range:    COVID-19, Rapid    Collection Time: 05/01/21  2:30 PM    Specimen: Nasopharyngeal Swab   Result Value Ref Range    Specimen Description . NASOPHARYNGEAL SWAB     SARS-CoV-2, Rapid Not Detected Not Detected   POC PANEL (G3)-DAVONTE    Collection Time: 05/01/21  2:41 PM   Result Value Ref Range    pH, [place] 7.45 (H) 7.32 - 7.42    pCO2, [place] 47 39 - 55 mm Hg    pO2, [place] 37 30 - 50 mm Hg    Total CO2, Venous 34 (H) 25 - 31 mmol/L    HCO3, Venous 32.7 (H) 24 - 30 mmol/L    Negative Base Excess, [place] NOT REPORTED 0.0 - 2.0    Positive Base Excess, [place] 9 (H) 0.0 - 2.0    O2 Sat, [place] 73 %    Pt Temp NOT REPORTED     O2 Device/Flow/% NOT REPORTED     FIO2 NOT REPORTED     POC pH Temp NOT REPORTED     POC pCO2 Temp NOT REPORTED mm Hg    POC pO2 Temp NOT REPORTED mm Hg   Urinalysis, reflex to microscopic    Collection Time: 05/01/21  3:20 PM   Result Value Ref Range    Color, UA YELLOW YELLOW    Turbidity UA CLEAR CLEAR    Glucose, Ur NEGATIVE NEGATIVE    Bilirubin Urine NEGATIVE NEGATIVE    Ketones, Urine NEGATIVE NEGATIVE    Specific Gravity, UA 1.015 1.005 - 1.030    Urine Hgb 1+ (A) NEGATIVE    pH, UA 5.5 5.0 - 8.0    Protein, UA NEGATIVE NEGATIVE    Urobilinogen, Urine Normal Normal    Nitrite, Urine NEGATIVE NEGATIVE    Leukocyte Esterase, Urine NEGATIVE NEGATIVE    Urinalysis Comments NOT REPORTED    Microscopic Urinalysis    Collection Time: 05/01/21  3:20 PM   Result Value Ref Range    -          WBC, UA 0 TO 2 0 - 5 /HPF    RBC, UA 2 TO 5 0 - 2 /HPF    Casts UA NOT REPORTED /LPF    Crystals, UA NOT REPORTED None /HPF    Epithelial Cells UA 0 TO 2 0 - 5 /HPF    Renal Epithelial, UA NOT REPORTED 0 /HPF    Bacteria, UA NOT REPORTED None    Mucus, UA NOT REPORTED None    Trichomonas, UA NOT REPORTED None    Amorphous, UA NOT REPORTED None    Other Observations UA NOT REPORTED NOT REQ. Yeast, UA NOT REPORTED None       Imaging/Diagnostics:  Xr Chest Portable    Result Date: 5/1/2021  Patchy bilateral airspace disease suggestive of multifocal inflammatory process or infection. Echo 6.21.2020    Left Ventricle: Systolic function is normal with an ejection fraction of 65-70%.   Tricuspid Valve: The right ventricular systolic pressure is mild to moderately elevated. RVSP estimated at 30-35 mmHg.   Tricuspid Valve:  There is mild to moderate pulmonary hypertension  Assessment :      Hospital Problems           Last Modified POA    * (Principal) Pneumonia-community-acquired 5/1/2021 Yes    Chronic atrial fibrillation ([facility] [address] 5/1/2021 Yes    Gastroesophageal reflux disease without esophagitis 5/1/2021 Yes Foot drop, right 5/1/2021 Yes    Hearing impairment 5/1/2021 Yes    Essential hypertension 5/1/2021 Yes    COPD (chronic obstructive pulmonary disease) ([facility] [address] 5/1/2021 Yes    Chronic diastolic heart failure ([facility] [address] 5/1/2021 Yes    BPH (benign prostatic hyperplasia) 5/1/2021 Yes    Atrial fibrillation with slow ventricular response ([facility] [address] 5/1/2021 Yes    Acute hypoxemic respiratory failure ([facility] [address] 5/1/2021 Yes          Plan:     Patient status inpatient in the Progressive Unit/Step down    1. Continue Levaquin which has been started in ER  2. Follow culture results  3. Rapid test for Covid test has been negative, follow PCR result  4. DVT prophylaxis  5. Oxygen and aerosol treatments  6. PT OT  7. Monitor daily labs    Consultations:   IP CONSULT TO INTERNAL MEDICINE     Patient is admitted as inpatient status because of co-morbidities listed above, severity of signs and symptoms as outlined, requirement for current medical therapies and most importantly because of direct risk to patient if care not provided in a hospital setting. Expected length of stay > 48 hours.     [address] MD  5/1/2021  5:13 PM    Copy sent to JULIAN Holguin - CNP

## 2021-05-02 ENCOUNTER — APPOINTMENT (OUTPATIENT)
Dept: GENERAL RADIOLOGY | Age: 76
DRG: 193 | End: 2021-05-02
Payer: MEDICARE

## 2021-05-02 LAB
ANION GAP SERPL CALCULATED.3IONS-SCNC: 7 MMOL/L (ref 9–17)
BUN BLDV-MCNC: 15 MG/DL (ref 8–23)
BUN/CREAT BLD: 18 (ref 9–20)
CALCIUM SERPL-MCNC: 8.6 MG/DL (ref 8.6–10.4)
CHLORIDE BLD-SCNC: 102 MMOL/L (ref 98–107)
CO2: 29 MMOL/L (ref 20–31)
CREAT SERPL-MCNC: 0.85 MG/DL (ref 0.7–1.2)
CULTURE: NO GROWTH
GFR AFRICAN AMERICAN: >60 ML/MIN
GFR NON-AFRICAN AMERICAN: >60 ML/MIN
GFR SERPL CREATININE-BSD FRML MDRD: ABNORMAL ML/MIN/{1.73_M2}
GFR SERPL CREATININE-BSD FRML MDRD: ABNORMAL ML/MIN/{1.73_M2}
GLUCOSE BLD-MCNC: 107 MG/DL (ref 70–99)
HCT VFR BLD CALC: 32.2 % (ref 40.7–50.3)
HEMOGLOBIN: 10.1 G/DL (ref 13–17)
Lab: NORMAL
MCH RBC QN AUTO: 31.6 PG (ref 25.2–33.5)
MCHC RBC AUTO-ENTMCNC: 31.4 G/DL (ref 28.4–34.8)
MCV RBC AUTO: 100.6 FL (ref 82.6–102.9)
NRBC AUTOMATED: 0 PER 100 WBC
PDW BLD-RTO: 13 % (ref 11.8–14.4)
PLATELET # BLD: 104 K/UL (ref 138–453)
PMV BLD AUTO: 8.6 FL (ref 8.1–13.5)
POTASSIUM SERPL-SCNC: 4 MMOL/L (ref 3.7–5.3)
RBC # BLD: 3.2 M/UL (ref 4.21–5.77)
SARS-COV-2: NORMAL
SARS-COV-2: NOT DETECTED
SODIUM BLD-SCNC: 138 MMOL/L (ref 135–144)
SOURCE: NORMAL
SPECIMEN DESCRIPTION: NORMAL
WBC # BLD: 7.7 K/UL (ref 3.5–11.3)

## 2021-05-02 PROCEDURE — 6370000000 HC RX 637 (ALT 250 FOR IP): Performed by: INTERNAL MEDICINE

## 2021-05-02 PROCEDURE — 2580000003 HC RX 258: Performed by: INTERNAL MEDICINE

## 2021-05-02 PROCEDURE — 71045 X-RAY EXAM CHEST 1 VIEW: CPT

## 2021-05-02 PROCEDURE — 99232 SBSQ HOSP IP/OBS MODERATE 35: CPT | Performed by: INTERNAL MEDICINE

## 2021-05-02 PROCEDURE — 85027 COMPLETE CBC AUTOMATED: CPT

## 2021-05-02 PROCEDURE — 2060000000 HC ICU INTERMEDIATE R&B

## 2021-05-02 PROCEDURE — 94761 N-INVAS EAR/PLS OXIMETRY MLT: CPT

## 2021-05-02 PROCEDURE — 80048 BASIC METABOLIC PNL TOTAL CA: CPT

## 2021-05-02 PROCEDURE — 36415 COLL VENOUS BLD VENIPUNCTURE: CPT

## 2021-05-02 PROCEDURE — 6360000002 HC RX W HCPCS: Performed by: INTERNAL MEDICINE

## 2021-05-02 PROCEDURE — 94640 AIRWAY INHALATION TREATMENT: CPT

## 2021-05-02 PROCEDURE — 2700000000 HC OXYGEN THERAPY PER DAY

## 2021-05-02 RX ADMIN — SODIUM CHLORIDE, PRESERVATIVE FREE 10 ML: 5 INJECTION INTRAVENOUS at 20:09

## 2021-05-02 RX ADMIN — BUMETANIDE 1 MG: 1 TABLET ORAL at 10:04

## 2021-05-02 RX ADMIN — POLYVINYL ALCOHOL 1 DROP: 14 SOLUTION/ DROPS OPHTHALMIC at 20:07

## 2021-05-02 RX ADMIN — BUDESONIDE AND FORMOTEROL FUMARATE DIHYDRATE 2 PUFF: 160; 4.5 AEROSOL RESPIRATORY (INHALATION) at 18:18

## 2021-05-02 RX ADMIN — ROPINIROLE HYDROCHLORIDE 0.25 MG: 0.25 TABLET, FILM COATED ORAL at 20:04

## 2021-05-02 RX ADMIN — PANCRELIPASE 24000 UNITS: 24000; 76000; 120000 CAPSULE, DELAYED RELEASE PELLETS ORAL at 12:43

## 2021-05-02 RX ADMIN — GABAPENTIN 300 MG: 300 CAPSULE ORAL at 20:05

## 2021-05-02 RX ADMIN — PANTOPRAZOLE SODIUM 40 MG: 40 TABLET, DELAYED RELEASE ORAL at 07:15

## 2021-05-02 RX ADMIN — POLYVINYL ALCOHOL 1 DROP: 14 SOLUTION/ DROPS OPHTHALMIC at 13:48

## 2021-05-02 RX ADMIN — BUDESONIDE AND FORMOTEROL FUMARATE DIHYDRATE 2 PUFF: 160; 4.5 AEROSOL RESPIRATORY (INHALATION) at 09:17

## 2021-05-02 RX ADMIN — POTASSIUM CHLORIDE 20 MEQ: 1500 TABLET, EXTENDED RELEASE ORAL at 17:47

## 2021-05-02 RX ADMIN — IPRATROPIUM BROMIDE AND ALBUTEROL SULFATE 1 AMPULE: .5; 3 SOLUTION RESPIRATORY (INHALATION) at 18:16

## 2021-05-02 RX ADMIN — ENOXAPARIN SODIUM 40 MG: 40 INJECTION SUBCUTANEOUS at 10:05

## 2021-05-02 RX ADMIN — POTASSIUM CHLORIDE 20 MEQ: 1500 TABLET, EXTENDED RELEASE ORAL at 07:15

## 2021-05-02 RX ADMIN — IPRATROPIUM BROMIDE AND ALBUTEROL SULFATE 1 AMPULE: .5; 3 SOLUTION RESPIRATORY (INHALATION) at 15:29

## 2021-05-02 RX ADMIN — POLYVINYL ALCOHOL 1 DROP: 14 SOLUTION/ DROPS OPHTHALMIC at 10:05

## 2021-05-02 RX ADMIN — TAMSULOSIN HYDROCHLORIDE 0.4 MG: 0.4 CAPSULE ORAL at 20:05

## 2021-05-02 RX ADMIN — ACETAMINOPHEN 650 MG: 325 TABLET ORAL at 20:04

## 2021-05-02 RX ADMIN — MULTIVITAMIN TABLET 1 TABLET: TABLET at 10:04

## 2021-05-02 RX ADMIN — FERROUS SULFATE TAB EC 325 MG (65 MG FE EQUIVALENT) 325 MG: 325 (65 FE) TABLET DELAYED RESPONSE at 07:15

## 2021-05-02 RX ADMIN — LEVOFLOXACIN 750 MG: 5 INJECTION, SOLUTION INTRAVENOUS at 17:47

## 2021-05-02 RX ADMIN — ROPINIROLE HYDROCHLORIDE 0.25 MG: 0.25 TABLET, FILM COATED ORAL at 10:04

## 2021-05-02 RX ADMIN — CETIRIZINE HYDROCHLORIDE 5 MG: 5 TABLET, FILM COATED ORAL at 10:04

## 2021-05-02 RX ADMIN — FERROUS SULFATE TAB EC 325 MG (65 MG FE EQUIVALENT) 325 MG: 325 (65 FE) TABLET DELAYED RESPONSE at 17:47

## 2021-05-02 RX ADMIN — PANCRELIPASE 24000 UNITS: 24000; 76000; 120000 CAPSULE, DELAYED RELEASE PELLETS ORAL at 18:27

## 2021-05-02 RX ADMIN — FLUTICASONE PROPIONATE 2 SPRAY: 50 SPRAY, METERED NASAL at 10:05

## 2021-05-02 RX ADMIN — GABAPENTIN 300 MG: 300 CAPSULE ORAL at 10:04

## 2021-05-02 RX ADMIN — GABAPENTIN 300 MG: 300 CAPSULE ORAL at 13:48

## 2021-05-02 RX ADMIN — OXYCODONE HYDROCHLORIDE AND ACETAMINOPHEN 500 MG: 500 TABLET ORAL at 10:04

## 2021-05-02 RX ADMIN — VITAM B12 100 MCG: 100 TAB at 10:04

## 2021-05-02 RX ADMIN — POLYVINYL ALCOHOL 1 DROP: 14 SOLUTION/ DROPS OPHTHALMIC at 17:55

## 2021-05-02 ASSESSMENT — PAIN SCALES - GENERAL: PAINLEVEL_OUTOF10: 0

## 2021-05-02 NOTE — ACP (ADVANCE CARE PLANNING)
Advance Care Planning     Advance Care Planning Activator (Inpatient)  Conversation Note      Date of ACP Conversation: 5/2/21    Conversation Conducted with: Patient with Decision Making Capacity    ACP Activator: [id number] [facility] Decision Maker:     Current Designated Health Care Decision Maker:     Click here to complete Healthcare Decision Makers including section of the Healthcare Decision Maker Relationship (ie \"Primary\")  Today we documented Decision Maker(s) consistent with Legal Next of Kin hierarchy. Care Preferences    Ventilation: \"If you were in your present state of health and suddenly became very ill and were unable to breathe on your own, what would your preference be about the use of a ventilator (breathing machine) if it were available to you? \"      Would the patient desire the use of ventilator (breathing machine)?: yes    \"If your health worsens and it becomes clear that your chance of recovery is unlikely, what would your preference be about the use of a ventilator (breathing machine) if it were available to you? \"     Would the patient desire the use of ventilator (breathing machine)?: Yes      Resuscitation  \"CPR works best to restart the heart when there is a sudden event, like a heart attack, in someone who is otherwise healthy. Unfortunately, CPR does not typically restart the heart for people who have serious health conditions or who are very sick. \"    \"In the event your heart stopped as a result of an underlying serious health condition, would you want attempts to be made to restart your heart (answer \"yes\" for attempt to resuscitate) or would you prefer a natural death (answer \"no\" for do not attempt to resuscitate)? \" yes       [x] Yes   [] No   Educated Patient / Sierra Alfred regarding differences between Advance Directives and portable DNR orders.     Length of ACP Conversation in minutes:      Conversation Outcomes:  [x] ACP discussion completed  [] Existing advance directive reviewed with patient; no changes to patient's previously recorded wishes  [] New Advance Directive completed  [] Portable Do Not Rescitate prepared for Provider review and signature  [] POLST/POST/MOLST/MOST prepared for Provider review and signature      Follow-up plan:    [] Schedule follow-up conversation to continue planning  [] Referred individual to Provider for additional questions/concerns   [] Advised patient/agent/surrogate to review completed ACP document and update if needed with changes in condition, patient preferences or care setting    [x] This note routed to one or more involved healthcare providers

## 2021-05-02 NOTE — PROGRESS NOTES
Patients blood pressure was 106/47 MAP 59 HR 82, patient has scheduled Toprol 50mg writer reached out to Domingo Saenz to ask if medication should be held. Order to hold nightly dose.

## 2021-05-02 NOTE — PLAN OF CARE
Problem: Falls - Risk of:  Goal: Will remain free from falls  Description: Will remain free from falls  Outcome: Ongoing  Note: Siderails up x 2  Hourly rounding. Call light in reach. Instructed to call for assist before attempting out of bed. Remains free from falls and accidental injury at this time. Floor free from obstacles, and bed is locked and in lowest position. Adequate lighting provided. Bed alarm on. Fall sticker on wristband. Fall Sign posted in doorway      Problem: Falls - Risk of:  Goal: Absence of physical injury  Description: Absence of physical injury  Outcome: Ongoing     Problem: Skin Integrity:  Goal: Will show no infection signs and symptoms  Description: Will show no infection signs and symptoms  Outcome: Ongoing     Problem: Skin Integrity:  Goal: Absence of new skin breakdown  Description: Absence of new skin breakdown  Outcome: Ongoing     Problem: Breathing Pattern - Ineffective:  Goal: Ability to achieve and maintain a regular respiratory rate will improve  Description: Ability to achieve and maintain a regular respiratory rate will improve  Outcome: Ongoing  Note: Patient on room air with SPO2 between 92-95%. RR and rhythm within normal limits. will continue to monitor.

## 2021-05-02 NOTE — PROGRESS NOTES
RN spoke with patient's wife and updated her on his status. All questions were answered to families satisfaction.

## 2021-05-02 NOTE — PROGRESS NOTES
bumetanide  1 mg Oral Daily    cetirizine  5 mg Oral Daily    sodium chloride flush  5-40 mL Intravenous 2 times per day    enoxaparin  40 mg Subcutaneous Daily    ipratropium-albuterol  1 ampule Inhalation Q4H WA    levofloxacin  750 mg Intravenous Q24H     Continuous Infusions:    sodium chloride 50 mL/hr at 05/01/21 1823    sodium chloride       PRN Meds: benzonatate, tiZANidine, sodium chloride flush, sodium chloride, nicotine, promethazine **OR** ondansetron, magnesium hydroxide, acetaminophen **OR** acetaminophen, albuterol    Data:     Past Medical History:   has a past medical history of Acute superficial gastritis without hemorrhage, Anastomotic stricture of stomach, Asthma, Atrial fibrillation ([facility] [address] Calculus of gallbladder without cholecystitis without obstruction, Chronic diastolic heart failure ([facility] [address] Chronic rhinosinusitis, Class 2 severe obesity due to excess calories with serious comorbidity and body mass index (BMI) of 36.0 to 36.9 in [facility]), COPD (chronic obstructive pulmonary disease) ([facility] [address] E. coli septicemia ([facility] [address] E. coli UTI, Foot drop, right, Fracture of femur ([facility] [address] Gait disorder, Gastric out let obstruction, GERD (gastroesophageal reflux disease), Hallux valgus, acquired, bilateral, Hyperlipidemia, Hypertension, Impaired hearing, Internal hemorrhoids, Lymphedema of both lower extremities, Nausea & vomiting, OA (osteoarthritis) of knee, bilateral, Obesity, MARIAMA on CPAP, Osteoarthritis, Osteoarthritis of lumbar spine, S/P revision of total knee, Septicemia due to E. coli ([facility] [address] Sick sinus syndrome ([address] Simple chronic bronchitis ([facility] [address] Slow transit constipation, Unspecified sleep apnea, and UTI (urinary tract infection). Social History:   reports that he quit smoking about 44 years ago. He has a 20.00 pack-year smoking history. He has never used smokeless tobacco. He reports that he does not drink alcohol or use drugs.      Family History:   Family History   Problem Relation Age of Onset    Cancer Mother     Heart Attack Father        Vitals:  BP (!) 100/55   Pulse 74   Temp 99.5 °F (37.5 °C) (Oral)   Resp 18   Ht 5' 10\" (1.778 m)   Wt 204 lb 5 oz (92.7 kg)   SpO2 100%   BMI 29.32 kg/m²   Temp (24hrs), Av.8 °F (37.1 °C), Min:98.1 °F (36.7 °C), Max:100.9 °F (38.3 °C)    No results for input(s): POCGLU in the last 72 hours. I/O (24Hr): Intake/Output Summary (Last 24 hours) at 2021 1248  Last data filed at 2021 0802  Gross per 24 hour   Intake 200 ml   Output 700 ml   Net -500 ml       Labs:  Hematology:  Recent Labs     21  1430 21  0550   WBC 16.1* 7.7   RBC 3.78* 3.20*   HGB 12.1* 10.1*   HCT 38.0* 32.2*   .5 100.6   MCH 32.0 31.6   MCHC 31.8 31.4   RDW 13.3 13.0    104*   MPV 8.8 8.6     Chemistry:  Recent Labs     21  1430 21  0550    138   K 3.8 4.0    102   CO2 27 29   GLUCOSE 116* 107*   BUN 17 15   CREATININE 1.00 0.85   ANIONGAP 13 7*   LABGLOM >60 >60   GFRAA >60 >60   CALCIUM 9.2 8.6   PROBNP 5,141*  --    TROPHS 54*  --      Recent Labs     21  1430   PROT 7.1   LABALBU 4.0   AST 46*   ALT 17   ALKPHOS 136*   BILITOT 1.45*   LIPASE 8*     ABG:  Lab Results   Component Value Date    FIO2 NOT REPORTED 2021     Lab Results   Component Value Date/Time    SPECIAL RT HAND 2021 03:10 PM     Lab Results   Component Value Date/Time    CULTURE NO GROWTH 8 HOURS 2021 03:10 PM       Radiology:  Xr Chest Portable    Result Date: 2021  Right upper lung field opacity appears more prominent, otherwise no significant change. Xr Chest Portable    Result Date: 2021  Patchy bilateral airspace disease suggestive of multifocal inflammatory process or infection. Echo 2020    Left Ventricle: Systolic function is normal with an ejection fraction of 65-70%.   Tricuspid Valve: The right ventricular systolic pressure is mild to moderately elevated.  RVSP estimated at 30-35 mmHg.    Tricuspid Valve: There is mild to moderate pulmonary hypertension    Physical Examination:        General appearance:  alert, sick looking, cooperative and mild distress  Mental Status:  oriented to person, place and time and normal affect  Lungs: Prolonged expiratory phase,+ bibasilar Rales, scattered wheezing   Heart:  regular rate and irregular rhythm, no murmur, pacemaker in place with some healing bruises around  Abdomen:  soft, nontender, nondistended, normal bowel sounds, no masses, hepatomegaly, splenomegaly  Extremities: + Brace right lower extremity, no edema, redness, tenderness in the calves  Skin:  no gross lesions, rashes, induration    Assessment:        Hospital Problems           Last Modified POA    * (Principal) Pneumonia-community-acquired 5/1/2021 Yes    Chronic atrial fibrillation ([address] 5/1/2021 Yes    Gastroesophageal reflux disease without esophagitis 5/1/2021 Yes    Foot drop, right 5/1/2021 Yes    Hearing impairment 5/1/2021 Yes    Essential hypertension 5/1/2021 Yes    COPD (chronic obstructive pulmonary disease) ([address] 5/1/2021 Yes    Chronic diastolic heart failure ([address] 5/1/2021 Yes    BPH (benign prostatic hyperplasia) 5/1/2021 Yes    Atrial fibrillation with slow ventricular response ([address] 5/1/2021 Yes    Acute hypoxemic respiratory failure ([address] 5/1/2021 Yes          Plan:        1. Continue IV Levaquin  2. Follow pending culture results  3. DVT prophylaxis  4. Continue aerosol treatments and oxygen  5.  PT OT    Lord Carolina MD  5/2/2021  12:48 PM

## 2021-05-02 NOTE — CARE COORDINATION
Case Management Initial Discharge Plan  Hermelinda Zaldivar,         Readmission Risk              Risk of Unplanned Readmission:        25             Met with:patient to discuss discharge plans. Information verified: address, contacts, phone number, , insurance Yes  PCP: JULIAN Reyes CNP  Date of last visit:     Insurance Provider: Poly Pitt     Discharge Planning  Current Residence:  Private home   Living Arrangements:  Spouse/Significant Other, Children  Wife Monae Godwin and daughters Leeroy Bocanegra       Home has 2 stories/5 stairs to climb to enter the home . Patient bed and bath are on the main floor. Support Systems:  Spouse/Significant Other, Children       Current Services PTA:  None  Agency: none       Patient able to perform ADL's:Independent  DME in home:  W/c, cane, walker, rollator, CPAP thru [facility], hospital bed, braces to LE, shower chair, railings and raised toilet seat   DME used to aid ambulation prior to admission:   Walker vs 2 canes   DME used during admission:  [address] Needed:  N/A    Pharmacy: [facility]    Potential Assistance Purchasing Medications:  No  Does patient want to participate in local refill/ meds to beds program?  No    Patient agreeable to home care: Yes  Freedom of choice provided:  yes    The Plan for Transition of Care is related to the following treatment goals: skilled RN and therapy potential     The Patient and/or patient representative   was provided with a choice of provider and agrees   with the discharge plan. [x] Yes [] No    Freedom of choice list was provided with basic dialogue that supports the patient's individualized plan of care/goals, treatment preferences and shares the quality data associated with the providers.  [x] Yes [] No    Type of Home Care Services:  Possible RN and therapy  Patient expects to be discharged to:   home     Prior SNF/Rehab Placement and Facility: [facility] and would not go back again   Agreeable to SNF/Rehab: No  Raymond of choice provided: n/a   Evaluation: n/a    Expected Discharge date:    5/5  Follow Up Appointment: Best Day/ Time:  any     Transportation provider: per family  Transportation arrangements needed for discharge: No    Discharge Plan:   Met with patient to discuss a discharge plan of care. Patient lives with wife and 2 daughters. Independent in his ADL's. . He was recently here 4/7-4/10 for pacemaker insertion and 4/11-4/14 for pacemaker pocket hematoma. On those admissions he declined any snf or home care. He has [facility] in the past and is open to it again if needed. Would like to have CM follow up closer to discharge to see how he is feeling. He has been to [facility] and does not think would want a snf again. At this time it is evident he does not feel well. He is on  3 liters NC and does not wear at home . He is admitted with pneumonia, negative for COVID . Has history of COPD and CHF. Therapy is pending and will follow up on their recommendations. Patient would benefit at least from [facility]. as had 3 admissions in less than one month.        Electronically signed by Thomas El RN on 5/2/21 at 2:57 PM EDT

## 2021-05-03 ENCOUNTER — TELEPHONE (OUTPATIENT)
Dept: PRIMARY CARE CLINIC | Age: 76
End: 2021-05-03

## 2021-05-03 DIAGNOSIS — I50.32 CHRONIC DIASTOLIC HEART FAILURE (HCC): Primary | ICD-10-CM

## 2021-05-03 PROBLEM — R65.10 SIRS (SYSTEMIC INFLAMMATORY RESPONSE SYNDROME) (HCC): Status: ACTIVE | Noted: 2021-05-03

## 2021-05-03 LAB
CULTURE: NORMAL
LACTIC ACID: 0.9 MMOL/L (ref 0.5–2.2)
Lab: NORMAL
PROCALCITONIN: 0.15 NG/ML
SPECIMEN DESCRIPTION: NORMAL

## 2021-05-03 PROCEDURE — 2700000000 HC OXYGEN THERAPY PER DAY

## 2021-05-03 PROCEDURE — 84145 PROCALCITONIN (PCT): CPT

## 2021-05-03 PROCEDURE — 2580000003 HC RX 258: Performed by: INTERNAL MEDICINE

## 2021-05-03 PROCEDURE — 99232 SBSQ HOSP IP/OBS MODERATE 35: CPT | Performed by: INTERNAL MEDICINE

## 2021-05-03 PROCEDURE — 97167 OT EVAL HIGH COMPLEX 60 MIN: CPT

## 2021-05-03 PROCEDURE — 94640 AIRWAY INHALATION TREATMENT: CPT

## 2021-05-03 PROCEDURE — 6370000000 HC RX 637 (ALT 250 FOR IP): Performed by: INTERNAL MEDICINE

## 2021-05-03 PROCEDURE — 97530 THERAPEUTIC ACTIVITIES: CPT

## 2021-05-03 PROCEDURE — 36415 COLL VENOUS BLD VENIPUNCTURE: CPT

## 2021-05-03 PROCEDURE — 83605 ASSAY OF LACTIC ACID: CPT

## 2021-05-03 PROCEDURE — 97162 PT EVAL MOD COMPLEX 30 MIN: CPT

## 2021-05-03 PROCEDURE — 6360000002 HC RX W HCPCS: Performed by: INTERNAL MEDICINE

## 2021-05-03 PROCEDURE — 97535 SELF CARE MNGMENT TRAINING: CPT

## 2021-05-03 PROCEDURE — 2060000000 HC ICU INTERMEDIATE R&B

## 2021-05-03 PROCEDURE — 94761 N-INVAS EAR/PLS OXIMETRY MLT: CPT

## 2021-05-03 PROCEDURE — 97116 GAIT TRAINING THERAPY: CPT

## 2021-05-03 RX ADMIN — ACETAMINOPHEN 650 MG: 325 TABLET ORAL at 01:17

## 2021-05-03 RX ADMIN — FERROUS SULFATE TAB EC 325 MG (65 MG FE EQUIVALENT) 325 MG: 325 (65 FE) TABLET DELAYED RESPONSE at 08:51

## 2021-05-03 RX ADMIN — POLYVINYL ALCOHOL 1 DROP: 14 SOLUTION/ DROPS OPHTHALMIC at 21:28

## 2021-05-03 RX ADMIN — POLYVINYL ALCOHOL 1 DROP: 14 SOLUTION/ DROPS OPHTHALMIC at 14:50

## 2021-05-03 RX ADMIN — IPRATROPIUM BROMIDE AND ALBUTEROL SULFATE 1 AMPULE: .5; 3 SOLUTION RESPIRATORY (INHALATION) at 20:24

## 2021-05-03 RX ADMIN — IPRATROPIUM BROMIDE AND ALBUTEROL SULFATE 1 AMPULE: .5; 3 SOLUTION RESPIRATORY (INHALATION) at 07:23

## 2021-05-03 RX ADMIN — PANCRELIPASE 24000 UNITS: 24000; 76000; 120000 CAPSULE, DELAYED RELEASE PELLETS ORAL at 17:19

## 2021-05-03 RX ADMIN — PANCRELIPASE 24000 UNITS: 24000; 76000; 120000 CAPSULE, DELAYED RELEASE PELLETS ORAL at 12:23

## 2021-05-03 RX ADMIN — OXYCODONE HYDROCHLORIDE AND ACETAMINOPHEN 500 MG: 500 TABLET ORAL at 08:52

## 2021-05-03 RX ADMIN — VITAM B12 100 MCG: 100 TAB at 08:52

## 2021-05-03 RX ADMIN — ROPINIROLE HYDROCHLORIDE 0.25 MG: 0.25 TABLET, FILM COATED ORAL at 08:52

## 2021-05-03 RX ADMIN — POLYVINYL ALCOHOL 1 DROP: 14 SOLUTION/ DROPS OPHTHALMIC at 17:21

## 2021-05-03 RX ADMIN — FERROUS SULFATE TAB EC 325 MG (65 MG FE EQUIVALENT) 325 MG: 325 (65 FE) TABLET DELAYED RESPONSE at 17:19

## 2021-05-03 RX ADMIN — GABAPENTIN 300 MG: 300 CAPSULE ORAL at 21:28

## 2021-05-03 RX ADMIN — TAMSULOSIN HYDROCHLORIDE 0.4 MG: 0.4 CAPSULE ORAL at 21:28

## 2021-05-03 RX ADMIN — POLYVINYL ALCOHOL 1 DROP: 14 SOLUTION/ DROPS OPHTHALMIC at 08:52

## 2021-05-03 RX ADMIN — IPRATROPIUM BROMIDE AND ALBUTEROL SULFATE 1 AMPULE: .5; 3 SOLUTION RESPIRATORY (INHALATION) at 10:57

## 2021-05-03 RX ADMIN — CETIRIZINE HYDROCHLORIDE 5 MG: 5 TABLET, FILM COATED ORAL at 08:51

## 2021-05-03 RX ADMIN — POTASSIUM CHLORIDE 20 MEQ: 1500 TABLET, EXTENDED RELEASE ORAL at 17:19

## 2021-05-03 RX ADMIN — GABAPENTIN 300 MG: 300 CAPSULE ORAL at 08:52

## 2021-05-03 RX ADMIN — BUDESONIDE AND FORMOTEROL FUMARATE DIHYDRATE 2 PUFF: 160; 4.5 AEROSOL RESPIRATORY (INHALATION) at 07:24

## 2021-05-03 RX ADMIN — SODIUM CHLORIDE, PRESERVATIVE FREE 10 ML: 5 INJECTION INTRAVENOUS at 21:28

## 2021-05-03 RX ADMIN — ROPINIROLE HYDROCHLORIDE 0.25 MG: 0.25 TABLET, FILM COATED ORAL at 21:28

## 2021-05-03 RX ADMIN — GABAPENTIN 300 MG: 300 CAPSULE ORAL at 14:50

## 2021-05-03 RX ADMIN — BUDESONIDE AND FORMOTEROL FUMARATE DIHYDRATE 2 PUFF: 160; 4.5 AEROSOL RESPIRATORY (INHALATION) at 20:24

## 2021-05-03 RX ADMIN — IPRATROPIUM BROMIDE AND ALBUTEROL SULFATE 1 AMPULE: .5; 3 SOLUTION RESPIRATORY (INHALATION) at 14:46

## 2021-05-03 RX ADMIN — ACETAMINOPHEN 650 MG: 325 TABLET ORAL at 21:27

## 2021-05-03 RX ADMIN — FLUTICASONE PROPIONATE 2 SPRAY: 50 SPRAY, METERED NASAL at 08:52

## 2021-05-03 RX ADMIN — LEVOFLOXACIN 750 MG: 5 INJECTION, SOLUTION INTRAVENOUS at 17:19

## 2021-05-03 RX ADMIN — POTASSIUM CHLORIDE 20 MEQ: 1500 TABLET, EXTENDED RELEASE ORAL at 08:51

## 2021-05-03 RX ADMIN — PANCRELIPASE 24000 UNITS: 24000; 76000; 120000 CAPSULE, DELAYED RELEASE PELLETS ORAL at 08:52

## 2021-05-03 RX ADMIN — BUMETANIDE 1 MG: 1 TABLET ORAL at 08:52

## 2021-05-03 RX ADMIN — MULTIVITAMIN TABLET 1 TABLET: TABLET at 08:52

## 2021-05-03 RX ADMIN — PANTOPRAZOLE SODIUM 40 MG: 40 TABLET, DELAYED RELEASE ORAL at 05:27

## 2021-05-03 ASSESSMENT — PAIN SCALES - GENERAL: PAINLEVEL_OUTOF10: 4

## 2021-05-03 ASSESSMENT — PAIN DESCRIPTION - ORIENTATION: ORIENTATION: LEFT

## 2021-05-03 ASSESSMENT — PAIN DESCRIPTION - ONSET: ONSET: GRADUAL

## 2021-05-03 ASSESSMENT — PAIN DESCRIPTION - FREQUENCY: FREQUENCY: INTERMITTENT

## 2021-05-03 ASSESSMENT — PAIN DESCRIPTION - PAIN TYPE: TYPE: ACUTE PAIN

## 2021-05-03 NOTE — FLOWSHEET NOTE
Writer entered room with patient sitting up in bed. I introduced myself and he asked about my background and where I attended [facility]. He gave me an invitation to [facility] and asked for a Angie Shape. I left him my card and some reading material. I returned a few minutes later with his Bible while he was taking a breathing treatment. Spiritual Care will follow-up as needed. 05/03/21 1538   Encounter Summary   Services provided to: Patient   Referral/Consult From: [facility]   Place of [address]   Continue Visiting   (05/03/2021)   Complexity of Encounter Moderate   Length of Encounter 15 minutes   Spiritual Assessment Completed Yes   Routine   Type Initial   Assessment Calm; Approachable; Hopeful;Peaceful   Intervention Active listening;Provided reading materials/devotional materials   Outcome Acceptance;Expressed gratitude;Expressed feelings of sergio, peace, and/or awe;Engaged in conversation;Coping;Encouraged; Hopeful;Receptive

## 2021-05-03 NOTE — TELEPHONE ENCOUNTER
Patient wife called, stated patient is back in the hospital here at [facility]. She said they need new referral to cardiologist because Dr Kuldip Arciniega dropped him. They would like to see someone here at [facility].  Please advise      Health Maintenance   Topic Date Due    COVID-19 Vaccine (1) Never done    Shingles Vaccine (1 of 2) Never done   [employer] Visit (AWV)  Never done    Flu vaccine (Season Ended) 11/05/2021 (Originally 9/1/2021)    Potassium monitoring  05/02/2022    Creatinine monitoring  05/02/2022    Lipid screen  04/01/2026    DTaP/Tdap/Td vaccine (2 - Td) 04/22/2029    Colon cancer screen colonoscopy  04/05/2031    Pneumococcal 65+ years Vaccine  Completed    AAA screen  Completed    Hepatitis C screen  Completed    Hepatitis A vaccine  Aged Out    Hepatitis B vaccine  Aged Out    Hib vaccine  Aged Out    Meningococcal (ACWY) vaccine  Aged Out             (applicable per patient's age: Cancer Screenings, Depression Screening, Fall Risk Screening, Immunizations)    LDL Cholesterol (mg/dL)   Date Value   04/01/2021 49     LDL Calculated (mg/dL)   Date Value   10/30/2013 67     AST (U/L)   Date Value   05/01/2021 46 (H)     ALT (U/L)   Date Value   05/01/2021 17     BUN (mg/dL)   Date Value   05/02/2021 15      (goal A1C is < 7)   (goal LDL is <100) need 30-50% reduction from baseline     BP Readings from Last 3 Encounters:   05/03/21 (!) 109/54   04/27/21 (!) 87/55   04/14/21 (!) 107/58    (goal /80)      All Future Testing planned in [facility]:  Lab Frequency Next Occurrence   Up as tolerated PRN    Intake and output EVERY 8 HOURS    Initiate Oxygen Therapy Protocol DAILY (RT)    Pulse Oximetry Spot Check PRN    HHN Treatment EVERY 2 HOURS PRN    HHN Treatment EVERY 4 HOURS WHILE AWAKE (RT)    Nasal Cannula Oxygen DAILY (RT)    CBC TOMORROW AM    Basic Metabolic Prof TOMORROW AM        Next Visit Date:  Future Appointments   Date Time Provider Jeri Flynn   7/28/2021  2:00 PM Seth Johnson Kriss Ware, APRN -  [facility]            Patient Active Problem List:     Chronic atrial fibrillation (HCC)     Dyslipidemia     Gastroesophageal reflux disease without esophagitis     Osteoarthritis of lumbar spine     OA (osteoarthritis) of knee, bilateral     Foot drop, right     Hallux valgus, acquired, bilateral     Hearing impairment     Essential hypertension     Slow transit constipation     MARIAMA on CPAP     Class 2 severe obesity due to excess calories with serious comorbidity and body mass index (BMI) of 36.0 to 36.9 in adult (HCC)     Simple chronic bronchitis (HCC)     Hospital-acquired bacterial pneumonia     Fluid overload     COPD (chronic obstructive pulmonary disease) (HCC)     Chronic diastolic heart failure (HCC)     Status post gastroplasty     BPH (benign prostatic hyperplasia)     Myalgia     Atrial fibrillation with slow ventricular response ([address]     Pacemaker pocket hematoma, initial encounter     Cellulitis of chest wall     Pneumonia-community-acquired     Acute hypoxemic respiratory failure ([address]

## 2021-05-03 NOTE — PROGRESS NOTES
Sepsis Protocol popped up, NP SIOBHAN OBREGON notified via perfect serve,    Lactic Acid order placed,

## 2021-05-03 NOTE — PROGRESS NOTES
Physician Progress Note      Patti Carrillo  [facility] #:                  [id number]  :                       1945  ADMIT DATE:       2021 1:48 PM  [address] [place] DATE:  RESPONDING  PROVIDER #:        Cecilia Hartman MD          QUERY TEXT:    Patient admitted with community acquired pneumonia. Noted documentation of   acute hypoxemic respiratory failure in H&P 21. In order to support the   diagnosis of acute respiratory failure, please include additional clinical   indicators in your documentation. Or please document if the diagnosis of   acute respiratory failure has been ruled out after further study. The medical record reflects the following:  Risk Factors: COPD, pneumonia, CHF  Clinical Indicators: reported 88% Sao2 at home, RR 16-20, O2 sats    2L/NC, per  ED provider notes:  Effort: Pulmonary effort is normal. No   respiratory distress. H&P:  + for shortness of breath, cough, congestion,   wheezing , Lungs: Prolonged expiratory phase,+ bibasilar Rales, scattered   wheezing  Treatment:  2-3 L per N/C, O2 monitoring    Acute Respiratory Failure Clinical Indicators per 3M MS-DRG Training Guide and   Quick Reference Guide:  pO2 < 60 mmHg or SpO2 (pulse oximetry) < 91% breathing room air  pCO2 > 50 and pH < 7.35  P/F ratio (pO2 / FIO2) < 300  pO2 decrease or pCO2 increase by 10 mmHg from baseline (if known)  Supplemental oxygen of 40% or more  Presence of respiratory distress, tachypnea, dyspnea, shortness of breath,   wheezing  Unable to speak in complete sentences  Use of accessory muscles to breathe  Extreme anxiety and feeling of impending doom  Tripod position  Confusion/altered mental status/obtunded  Options provided:  -- Acute Respiratory Failure as evidenced by, Please document evidence.   -- Acute Respiratory Failure ruled out after study  -- Other - I will add my own diagnosis  -- Disagree - Not applicable / Not valid  -- Disagree - Clinically unable to determine / Unknown  -- Refer to Clinical Documentation Reviewer    PROVIDER RESPONSE TEXT:    This patient is in acute respiratory failure as evidenced by Shortness of   breath, wheezing, hypoxia, respiratory distress, requiring oxygen via nasal   cannula    Query created by: Zenaida Chatterjee on 5/3/2021 9:50 AM      Electronically signed by:  Vikram De Souza MD 5/3/2021 10:14 AM

## 2021-05-03 NOTE — PLAN OF CARE
Problem: Falls - Risk of:  Goal: Will remain free from falls  Description: Will remain free from falls  5/2/2021 2316 by Paolo Kilgore RN  Outcome: Ongoing     Problem: Falls - Risk of:  Goal: Absence of physical injury  Description: Absence of physical injury  5/2/2021 2316 by Paolo Kilgore RN  Outcome: Ongoing     Problem: Skin Integrity:  Goal: Will show no infection signs and symptoms  Description: Will show no infection signs and symptoms  5/2/2021 2316 by Paolo Kilgore RN  Outcome: Ongoing     Problem: Skin Integrity:  Goal: Absence of new skin breakdown  Description: Absence of new skin breakdown  5/2/2021 2316 by Paolo Kilgore RN  Outcome: Ongoing     Problem: Breathing Pattern - Ineffective:  Goal: Ability to achieve and maintain a regular respiratory rate will improve  Description: Ability to achieve and maintain a regular respiratory rate will improve  5/2/2021 2316 by Paolo Kilgore RN  Outcome: Ongoing

## 2021-05-03 NOTE — PROGRESS NOTES
not trust it.     He had [place] dual-chamber pacemaker placed on 4/9/2021 secondary to sick sinus syndrome, later he had pacemaker pocket hematoma due to which Xarelto was stopped which he was supposed to restart after visiting cardiologist in the office, it is not clear if he restarted Xarelto or not.     His history of previous gastric bypass surgery, atrial fibrillation which is chronic, chronic diastolic CHF, obstructive sleep apnea with CPAP use, COPD, essential hypertension, dyslipidemia, bilateral foot drop(wears braces to walk)  His rapid Covid is negative  Chest x-ray shows patchy bilateral airspace disease  His proBNP is 5141 which is chronically high and it was higher during the previous admission      Review of Systems:     Constitutional:  negative for chills,+ fevers, sweats,+ fatigue, lightheaded when walking up to bathroom  Respiratory:  + shortness of breath, wheezing  Cardiovascular:  negative for chest pain, chest pressure/discomfort, lower extremity edema, palpitations  Gastrointestinal:  negative for abdominal pain, constipation, diarrhea, nausea, vomiting,+ diminished appetite  Neurological:  negative for dizziness, headache    Medications: Allergies:     Allergies   Allergen Reactions    Darvocet [Propoxyphene N-Acetaminophen] Hives    Other Hives    Oxycodone-Acetaminophen     Propoxyphene      Other reaction(s): Unknown       Current Meds:   Scheduled Meds:    Armodafinil  1 tablet Mouth/Throat BID    budesonide-formoterol  2 puff Inhalation BID    polyvinyl alcohol  1 drop Both Eyes 4x Daily    ferrous sulfate  325 mg Oral BID WC    ascorbic acid  500 mg Oral Daily    fluticasone  2 spray Nasal Daily    potassium chloride  20 mEq Oral BID    multivitamin  1 tablet Oral Daily    pantoprazole  40 mg Oral Daily    lipase-protease-amylase  24,000 Units Oral TID WC    rOPINIRole  0.25 mg Oral BID    tamsulosin  0.4 mg Oral Daily    vitamin B-12  100 mcg Oral Daily    gabapentin  300 mg Oral TID    metoprolol succinate  50 mg Oral Nightly    bumetanide  1 mg Oral Daily    cetirizine  5 mg Oral Daily    sodium chloride flush  5-40 mL Intravenous 2 times per day    enoxaparin  40 mg Subcutaneous Daily    ipratropium-albuterol  1 ampule Inhalation Q4H WA    levofloxacin  750 mg Intravenous Q24H     Continuous Infusions:    sodium chloride       PRN Meds: benzonatate, tiZANidine, sodium chloride flush, sodium chloride, nicotine, promethazine **OR** ondansetron, magnesium hydroxide, acetaminophen **OR** acetaminophen, albuterol    Data:     Past Medical History:   has a past medical history of Acute superficial gastritis without hemorrhage, Anastomotic stricture of stomach, Asthma, Atrial fibrillation ([facility]), Calculus of gallbladder without cholecystitis without obstruction, Chronic diastolic heart failure ([facility]), Chronic rhinosinusitis, Class 2 severe obesity due to excess calories with serious comorbidity and body mass index (BMI) of 36.0 to 36.9 in [facility]), COPD (chronic obstructive pulmonary disease) ([facility] E. coli septicemia ([facility] E. coli UTI, Foot drop, right, Fracture of femur ([facility] Gait disorder, Gastric out let obstruction, GERD (gastroesophageal reflux disease), Hallux valgus, acquired, bilateral, Hyperlipidemia, Hypertension, Impaired hearing, Internal hemorrhoids, Lymphedema of both lower extremities, Nausea & vomiting, OA (osteoarthritis) of knee, bilateral, Obesity, MARIAMA on CPAP, Osteoarthritis, Osteoarthritis of lumbar spine, S/P revision of total knee, Septicemia due to E. coli ([facility]), Sick sinus syndrome ([facility]), Simple chronic bronchitis (HCC), Slow transit constipation, Unspecified sleep apnea, and UTI (urinary tract infection). Social History:   reports that he quit smoking about 44 years ago. He has a 20.00 pack-year smoking history. He has never used smokeless tobacco. He reports that he does not drink alcohol or use drugs.      Family History:   Family History   Problem Relation Age of Onset    Cancer Mother     Heart Attack Father        Vitals:  BP (!) 116/53   Pulse 84   Temp 98.8 °F (37.1 °C) (Oral)   Resp 18   Ht 5' 10\" (1.778 m)   Wt 196 lb 3.2 oz (89 kg)   SpO2 100%   BMI 28.15 kg/m²   Temp (24hrs), Av.2 °F (37.3 °C), Min:98.2 °F (36.8 °C), Max:100.6 °F (38.1 °C)    No results for input(s): POCGLU in the last 72 hours. I/O (24Hr): Intake/Output Summary (Last 24 hours) at 5/3/2021 1309  Last data filed at 5/3/2021 1149  Gross per 24 hour   Intake 1300 ml   Output 2000 ml   Net -700 ml       Labs:  Hematology:  Recent Labs     21  1430 21  0550   WBC 16.1* 7.7   RBC 3.78* 3.20*   HGB 12.1* 10.1*   HCT 38.0* 32.2*   .5 100.6   MCH 32.0 31.6   MCHC 31.8 31.4   RDW 13.3 13.0    104*   MPV 8.8 8.6     Chemistry:  Recent Labs     21  1430 21  0550    138   K 3.8 4.0    102   CO2 27 29   GLUCOSE 116* 107*   BUN 17 15   CREATININE 1.00 0.85   ANIONGAP 13 7*   LABGLOM >60 >60   GFRAA >60 >60   CALCIUM 9.2 8.6   PROBNP 5,141*  --    TROPHS 54*  --      Recent Labs     21  1430   PROT 7.1   LABALBU 4.0   AST 46*   ALT 17   ALKPHOS 136*   BILITOT 1.45*   LIPASE 8*     ABG:  Lab Results   Component Value Date    FIO2 NOT REPORTED 2021     Lab Results   Component Value Date/Time    SPECIAL NOT REPORTED 2021 03:20 PM     Lab Results   Component Value Date/Time    CULTURE NO GROWTH 2021 03:20 PM       Radiology:  Xr Chest Portable    Result Date: 2021  Right upper lung field opacity appears more prominent, otherwise no significant change. Xr Chest Portable    Result Date: 2021  Patchy bilateral airspace disease suggestive of multifocal inflammatory process or infection. Echo 2020    Left Ventricle: Systolic function is normal with an ejection fraction of 65-70%.   Tricuspid Valve: The right ventricular systolic pressure is mild to moderately elevated.  RVSP estimated at 30-35 mmHg.   Tricuspid Valve: There is mild to moderate pulmonary hypertension    Physical Examination:        General appearance:  alert, still sick looking, cooperative and mild distress  Mental Status:  oriented to person, place and time and normal affect  Lungs: Prolonged expiratory phase,+ bibasilar Rales, scattered wheezing   Heart:  regular rate and irregular rhythm, no murmur, pacemaker in place with some healing bruises around  Abdomen:  soft, nontender, nondistended, normal bowel sounds, no masses, hepatomegaly, splenomegaly  Extremities: + Brace right lower extremity, no edema, redness, tenderness in the calves  Skin:  no gross lesions, rashes, induration    Assessment:        Hospital Problems           Last Modified POA    * (Principal) Pneumonia-community-acquired 5/1/2021 Yes    Acute hypoxemic respiratory failure ([address] 5/3/2021 Yes    SIRS (systemic inflammatory response syndrome) ([address] 5/3/2021 Yes    Chronic atrial fibrillation ([address] 5/1/2021 Yes    Gastroesophageal reflux disease without esophagitis 5/1/2021 Yes    Foot drop, right 5/1/2021 Yes    Hearing impairment 5/1/2021 Yes    Essential hypertension 5/1/2021 Yes    COPD (chronic obstructive pulmonary disease) ([address] 5/1/2021 Yes    Chronic diastolic heart failure ([address] 5/1/2021 Yes    BPH (benign prostatic hyperplasia) 5/1/2021 Yes    Atrial fibrillation with slow ventricular response ([address] 5/1/2021 Yes          Plan:        1. Continue IV Levaquin  2. Follow pending culture results-negative so far  3. DVT prophylaxis  4. Continue aerosol treatments, inhaled steroids and oxygen  5.  PT OT, check orthostatics    Gunjan Bowen MD  5/3/2021  1:09 PM

## 2021-05-03 NOTE — PROGRESS NOTES
Physical Therapy    Facility/Department: [facility] PROGRESSIVE CARE  Initial Assessment    NAME: Krys Matta  : 1945  MRN: [id number]    Date of Service: 5/3/2021    Discharge Recommendations:  Patient would benefit from continued therapy after discharge     Due to recent hospitalization and medical condition, pt would benefit from additional therapy at time of discharge to ensure safety. Please refer to the AM-PAC score for current functional status. Assessment   Body structures, Functions, Activity limitations: Decreased functional mobility ; Decreased strength;Decreased endurance;Decreased sensation;Decreased balance;Decreased posture  Assessment: Patient motivated to work with therapy. Patient required min assist to don AFOs, patient reports he is typically indep with donning and doffing. Patient demo decline in gait, transfers, ablance, and stregnth and benefit from PT to return to prior level of function. Patient demo good safety jusgement. Prognosis: Good  Decision Making: Medium Complexity  PT Education: Goals;PT Role;Plan of Care;Disease Specific Education;General Safety  Patient Education: Patient educated to deep breathing activities and benefits of being OOB. Patient demo good understanding. REQUIRES PT FOLLOW UP: Yes  Activity Tolerance  Activity Tolerance: Patient limited by endurance  Activity Tolerance: Patient SOB with minimal ambulation and with donning AFOS       Patient Diagnosis(es): The encounter diagnosis was Pneumonia due to organism.      has a past medical history of Acute superficial gastritis without hemorrhage, Anastomotic stricture of stomach, Asthma, Atrial fibrillation ([address] Calculus of gallbladder without cholecystitis without obstruction, Chronic diastolic heart failure ([address] Chronic rhinosinusitis, Class 2 severe obesity due to excess calories with serious comorbidity and body mass index (BMI) of 36.0 to 36.9 in [facility]), COPD (chronic obstructive pulmonary disease) ([facility] E. coli septicemia ([facility] E. coli UTI, Foot drop, right, Fracture of femur ([facility] Gait disorder, Gastric out let obstruction, GERD (gastroesophageal reflux disease), Hallux valgus, acquired, bilateral, Hyperlipidemia, Hypertension, Impaired hearing, Internal hemorrhoids, Lymphedema of both lower extremities, Nausea & vomiting, OA (osteoarthritis) of knee, bilateral, Obesity, MARIAMA on CPAP, Osteoarthritis, Osteoarthritis of lumbar spine, S/P revision of total knee, Septicemia due to E. coli ([facility] Sick sinus syndrome (HCC), Simple chronic bronchitis (HCC), Slow transit constipation, Unspecified sleep apnea, and UTI (urinary tract infection). has a past surgical history that includes Finger amputation (1966); Gastric bypass surgery (1985); Carpal tunnel release; Colonoscopy; Rotator cuff repair; joint replacement (2004 & 2005); Hemorrhoid surgery; pr egd transoral biopsy single/multiple (N/A, 05/25/2018); Upper gastrointestinal endoscopy (08/13/2018); Upper gastrointestinal endoscopy (N/A, 08/13/2018); Upper gastrointestinal endoscopy (N/A, 08/13/2018); Upper gastrointestinal endoscopy (08/16/2018); pr egd flexible foreign body removal (N/A, 08/16/2018); Upper gastrointestinal endoscopy (08/16/2018); Upper gastrointestinal endoscopy (N/A, 10/01/2018); Upper gastrointestinal endoscopy (10/01/2018); Upper gastrointestinal endoscopy (N/A, 11/19/2018); Cystoscopy (01/02/2019); Cystoscopy (N/A, 01/02/2019); Upper gastrointestinal endoscopy (N/A, 10/15/2020); Colonoscopy (N/A, 04/05/2021); and Pacemaker insertion (04/09/2021). Restrictions  Restrictions/Precautions  Restrictions/Precautions: General Precautions, Fall Risk  Required Braces or Orthoses?: Yes  Required Braces or Orthoses  Right Lower Extremity Brace: Ankle Foot Orthotics  Left Lower Extremity Brace:  Erika Foot Orthotics  Position Activity Restriction  Other position/activity restrictions: IV, continuous SP02, bilateral AFO  Vision/Hearing  Vision: Impaired  Vision Exceptions: Wears glasses at all times  Hearing: Exceptions to [facility]  Hearing Exceptions: Hard of hearing/hearing concerns;Bilateral hearing aid     Subjective  General  Chart Reviewed: Yes  Patient assessed for rehabilitation services?: Yes  Additional Pertinent Hx: COPD, LE lymphedema, Foot drop with AFO from back issues, SSS, CERD, a-fib, gastric bypass  Response To Previous Treatment: Not applicable  Family / Caregiver Present: No  Diagnosis: Pneumonia  Follows Commands: Within Functional Limits  General Comment  Comments: RACHEL Emerson reports patient medically appropriate for PT and requests orthostatic BP. Subjective  Subjective: Patient very pleasant and agreeable to PT. Patient reports in hospital from 4/7/21 to 4/10/21 for pacemaker placement and returned 4/11 to 6/48 due to complications with hematoma at pacemaker site. Patient reports he has been getting weaker over the last weak and then vomited with c-pap mask on resulting in aspiration. Pain Screening  Patient Currently in Pain: Yes     Pre Treatment Pain Screening  Intervention List: Patient able to continue with treatment    Orientation  Orientation  Overall Orientation Status: Within Normal Limits  Social/Functional History  Social/Functional History  Lives With: Spouse(2 adult daughters)  Type of Home: House  Home Layout: Able to Live on Main level with bedroom/bathroom, Two level  Home Access: Stairs to enter with rails  Entrance Stairs - Number of Steps: 5  Entrance Stairs - Rails: Right  Bathroom Shower/Tub: Tub/Shower unit, Shower chair with back  Bathroom Toilet: Standard  Bathroom Equipment: Grab bars in shower, Toilet raiser  Home Equipment: Cane, Rolling walker, [address] bed  ADL Assistance: Independent  Homemaking Assistance: Needs assistance(Does dishes, light cooking)  Ambulation Assistance: Independent(RW and leg braces)  Transfer Assistance: Independent  Active : No  Patient's  Info:  Son or daughter drives  Occupation: Retired  Type of occupation: Special educationTeacher  Leisure & Hobbies: Paint  Additional Comments: No falls in the last 6 months  Cognition   Cognition  Overall Cognitive Status: WNL    Objective     Observation/Palpation  Posture: Poor(flexed posture with ambulation)  Observation: SOB with minimal activity, but SP02 remained . 92% on 2 liters 02. AROM RLE (degrees)  RLE AROM: WFL  AROM LLE (degrees)  LLE AROM : WFL  Strength RLE  Comment: hip 3+/5, knee 3+/5, ankle 3-/5  Strength LLE  Comment: hip 3+/5, knee 3+/5, ankle 3-/5  Tone RLE  RLE Tone: Normotonic  Tone LLE  LLE Tone: Normotonic  Motor Control  Gross Motor?: WFL(except bilateral foot drop)  Sensation  Overall Sensation Status: Impaired(Neuropathy in feet, gets cramoing in hands)  Bed mobility  Rolling to Left: Supervision  Rolling to Right: Supervision  Supine to Sit: Supervision  Comment: Patient demo good use of bed rails, reports mild dizziness with sitting up, but resolved within 1 minute  Transfers  Sit to Stand: Contact guard assistance  Stand to sit: Contact guard assistance  Bed to Chair: Contact guard assistance  Stand Pivot Transfers: Contact guard assistance  Comment: RW, demo good safety including hand placement with transfers and backing all the way up to the chair. Ambulation  Ambulation?: Yes  Ambulation 1  Surface: level tile  Device: Rolling Walker  Assistance: Minimal assistance  Quality of Gait: Flexed posture, very wide JACOB, SOB with ambulation, Bilateral AFOs on  Gait Deviations: Slow Zoie; Increased JACOB; Decreased step length;Decreased step height;Decreased head and trunk rotation  Distance: 20 feet     Balance  Sitting - Static: Good  Sitting - Dynamic: Good  Standing - Static: Fair(RW)  Standing - Dynamic: Fair;-(RW)        Plan   Plan  Times per week: 1-2x/d, 5-6 d/wk  Current Treatment Recommendations: Strengthening, Balance Training, Functional Mobility Training, Transfer Training, Stair training, Gait Training, Endurance Training, Patient/Caregiver Education & Training, Home Exercise Program, Safety Education & Training  Safety Devices  Type of devices: All fall risk precautions in place, Gait belt, Nurse notified, Call light within reach, Left in chair    G-Code       OutComes Score                                                  AM-PAC Score  AM-PAC Inpatient Mobility Raw Score : 17 (05/03/21 1313)  AM-PAC Inpatient T-Scale Score : 42.13 (05/03/21 1313)  Mobility Inpatient CMS 0-100% Score: 50.57 (05/03/21 1313)  Mobility Inpatient CMS G-Code Modifier : CK (05/03/21 1313)          Goals  Short term goals  Time Frame for Short term goals: 12 visits  Short term goal 1: Patient will be indep with bed mobility and transfers. Short term goal 2: Patient will amb 200 feet with RW and AFOs and supervision. Short term goal 3: Patient will negotiate 5 stairs with rail and supervision. Short term goal 4: Patient will be indep with HEP. Short term goal 5: Patient will tolerate 30 minutes of ther-ex and ther-act.   Patient Goals   Patient goals : Return home, OP PT       Therapy Time   Individual Concurrent Group Co-treatment   Time In 1001         Time Out 1054         Minutes 53         Timed Code Treatment Minutes: [address], PT

## 2021-05-03 NOTE — DISCHARGE INSTR - COC
Continuity of Care Form    Patient Name: Benedicto Howard   :    MRN:  [id number]    Admit date:  2021  Discharge date:  21  Code Status Order: Full Code   Advance Directives:      Admitting Physician:  Mamta Neri MD  PCP: Luis Richardson, APRN - CNP    Discharging Nurse: Devorah [facility] Unit/Room#: 1021/1021-01  Discharging Unit Phone Number: [phone]    Emergency Contact:   Extended Emergency Contact Information  Primary Emergency Contact: Marti  Address: [address] [place] [address] Phone: [phone]  Work Phone: [phone]  Mobile Phone: [phone]  Relation: Spouse  Secondary Emergency Contact: Jeremy Mendoza  Address: [address] Phone: [phone]  Work Phone: [phone]  Mobile Phone: [phone]  Relation: Child    Past Surgical History:  Past Surgical History:   Procedure Laterality Date    CARPAL TUNNEL RELEASE      bilateral    COLONOSCOPY      COLONOSCOPY N/A 2021    COLONOSCOPY DIAGNOSTIC performed by Sreekanth Max MD at [facility]  2019    by Dr. Cyn Evans N/A 2019    CYSTOSCOPY performed by Melany Varma MD at [address]    first knuckle right index finger   [facility]    vertical banded gastroplasty   [address] REPLACEMENT   & 2005    bilateral knees    PACEMAKER INSERTION  2021    [place], placed by Dr. Zain Sanches EGD [address] [facility] [address] N/A 2018    EGD FOREIGN BODY REMOVAL performed by Jannet Archibald MD at [address] [facility] EGD TRANSORAL BIOPSY SINGLE/MULTIPLE N/A 2018    EGD BIOPSY performed by Queta Preciado DO at [address][1]      left shoulder    UPPER GASTROINTESTINAL ENDOSCOPY  2018    removal of food bolus    UPPER GASTROINTESTINAL ENDOSCOPY N/A 2018    EGD FOREIGN BODY REMOVAL performed by Ashely Daily, DO at [address] N/A 08/13/2018    EGD BIOPSY performed by Ashely Daily, DO at [address]  08/16/2018    egd with balloon dilitation    UPPER GASTROINTESTINAL ENDOSCOPY  08/16/2018    EGD DILATION BALLOON performed by Elin Lebron MD at [address] N/A 10/01/2018    EGD BIOPSY performed by Farrah Leyva MD at [address] [place] [address] ENDOSCOPY  10/01/2018    EGD DILATION BALLOON performed by Farrah Leyva MD at [address] [place] [address] N/A 11/19/2018    EGD DILATION BALLOON performed by Farrah Leyva MD at [address] [place] [address] N/A 10/15/2020    EGD SUBMUCOSAL/BOTOX INJECTION tattoo  performed by Janell Mike MD at [facility]       Immunization History:   Immunization History   Administered Date(s) Administered    Influenza Vaccine, unspecified formulation 01/02/2015    Influenza Virus Vaccine 01/02/2015    Influenza, Jeanne Reyez, IM, PF (6 mo and older Fluzone, Flulaval, Fluarix, and 3 yrs and older Afluria) 09/28/2019    Pneumococcal Conjugate 13-valent ([id number]) 08/17/2018    Pneumococcal Polysaccharide ([id number]) 10/31/2019    Tdap (Boostrix, Adacel) 04/22/2019       Active Problems:  Patient Active Problem List   Diagnosis Code    Chronic atrial fibrillation (HCC) I48.20    Dyslipidemia E78.5    Gastroesophageal reflux disease without esophagitis K21.9    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    OA (osteoarthritis) of knee, bilateral M17.10    Foot drop, right M21.371    Hallux valgus, acquired, bilateral M20.11, M20.12    Hearing impairment H91.90    Essential hypertension I10    Slow transit constipation K59.01    MARIAMA on CPAP G47.33, Z99.89    Class 2 severe obesity due to excess calories with serious comorbidity and body mass index (BMI) of 36.0 to 36.9 in adult ([facility] E66.01, Z68.36    Simple chronic bronchitis ([facility]) J41.0    Hospital-acquired bacterial pneumonia J15.9    Fluid overload E87.70    COPD (chronic obstructive pulmonary disease) ([facility]) J44.9    Chronic diastolic heart failure ([facility]) I50.32    Status post gastroplasty Z98.890    BPH (benign prostatic hyperplasia) N40.0    Myalgia M79.10    Atrial fibrillation with slow ventricular response ([facility]) I48.91    Pacemaker pocket hematoma, initial encounter T82.837A    Cellulitis of chest wall L03.313    Pneumonia-community-acquired J18.9    Acute hypoxemic respiratory failure ([facility]) J96.01    SIRS (systemic inflammatory response syndrome) ([facility]) R65.10       Isolation/Infection:   Isolation            No Isolation          Patient Infection Status       Infection Onset Added Last Indicated Last Indicated By Review Planned Expiration Resolved Resolved By    None active    Resolved    COVID-19 Rule Out 05/01/21 05/01/21 05/01/21 COVID-19 (Ordered)   05/02/21 Rule-Out Test Resulted    COVID-19 Rule Out 05/01/21 05/01/21 05/01/21 COVID-19, Rapid (Ordered)   05/01/21 Rule-Out Test Resulted    COVID-19 Rule Out 04/01/21 04/01/21 04/01/21 COVID-19 (Ordered)   04/02/21 Rule-Out Test Resulted    COVID-19 Rule Out 10/11/20 10/11/20 10/11/20 COVID-19, PCR (Ordered)   10/11/20 Rule-Out Test Resulted            Nurse Assessment:  Last Vital Signs: BP (!) 116/53   Pulse 84   Temp 98.8 °F (37.1 °C) (Oral)   Resp 18   Ht 5' 10\" (1.778 m)   Wt 196 lb 3.2 oz (89 kg)   SpO2 100%   BMI 28.15 kg/m²     Last documented pain score (0-10 scale): Pain Level: 4  Last Weight:   Wt Readings from Last 1 Encounters:   05/03/21 196 lb 3.2 oz (89 kg)     Mental Status:  oriented    IV Access:  - None    Nursing Mobility/ADLs:  Walking   Independent  Transfer  Independent  Bathing  Independent  Dressing  Independent  Toileting  Independent  Feeding  Independent  Med Admin  Independent  Med Delivery   whole    Wound Care Documentation and Therapy:        Elimination:  Continence:   · Bowel: Yes  · Bladder: Yes  Urinary Catheter: None   Colostomy/Ileostomy/Ileal Conduit: No       Date of Last BM: 5/6/21    Intake/Output Summary (Last 24 hours) at 5/3/2021 1551  Last data filed at 5/3/2021 1149  Gross per 24 hour   Intake 1300 ml   Output 1600 ml   Net -300 ml     I/O last 3 completed shifts: In: 1300 [I.V.:1300]  Out: 1600 [Urine:1600]    Safety Concerns:     None    Impairments/Disabilities:      None    Nutrition Therapy:  Current Nutrition Therapy:   - Oral Diet:  Dysphagia 3 advanced    Routes of Feeding: Oral  Liquids: No Restrictions  Daily Fluid Restriction: no  Last Modified Barium Swallow with Video (Video Swallowing Test): not done    Treatments at the Time of Hospital Discharge:   Respiratory Treatments: ***  Oxygen Therapy:  is not on home oxygen therapy. Ventilator:    - No ventilator support    Rehab Therapies: Physical Therapy and Occupational Therapy  Weight Bearing Status/Restrictions: No weight bearing restirctions  Other Medical Equipment (for information only, NOT a DME order):  wheelchair, walker, bath bench and hospital bed  Other Treatments:   1. Skilled RN assessment  2.  Medication reconciliation     Patient's personal belongings (please select all that are sent with patient):  Glasses    RN SIGNATURE:  Electronically signed by Markell Gómez RN on 5/6/21 at 10:28 AM EDT    CASE MANAGEMENT/SOCIAL WORK SECTION    Inpatient Status Date: 5/1    Readmission Risk Assessment Score:  Readmission Risk              Risk of Unplanned Readmission:        25           Discharging to Facility/ Agency   · Name: [facility]  · Phone: [phone]  · Fax: [phone]      / signature: Electronically signed by Hattie Garcia RN on 5/3/21 at 3:53 PM EDT  Electronically signed by Cathy Hernandez RN on 5/6/2021 at 12:26 PM     PHYSICIAN SECTION    Prognosis: Fair    Condition at Discharge: stable    Rehab Potential (if transferring to Rehab): fair    Recommended Labs or Other Treatments After Discharge:   Levaquin  Respiratory therapy  Bumex  Toprol   Reflux precautions  Monitor and control blood pressure  PT/OT  Monitor platelet counts  Reflux precautions  Follow-up with PCP in one week, Donnell Leonard, APRN - CNP  Cardiology follow-up Dr Kojo Gomez      Physician Certification: I certify the above information and transfer of Bg Pabon  is necessary for the continuing treatment of the diagnosis listed and that he requires [address] for less 30 days. Update Admission H&P:   Principal Problem:    Pneumonia-community-acquired  Active Problems:    Chronic atrial fibrillation (HCC)    Gastroesophageal reflux disease without esophagitis    Foot drop, right    Hearing impairment    Essential hypertension    MARIAMA on CPAP    Pneumonia due to organism    COPD (chronic obstructive pulmonary disease) (HCC)    Chronic diastolic heart failure (HCC)    History of bariatric surgery including banding leading to esophageal stricture and aspiration    BPH (benign prostatic hyperplasia)    Atrial fibrillation with slow ventricular response (HCC)    Acute hypoxemic respiratory failure (HCC)    SIRS (systemic inflammatory response syndrome) ([address]    Pulmonary hypertension (HCC)    S/P placement of cardiac pacemaker  Resolved Problems:    * No resolved hospital problems.  *      PHYSICIAN SIGNATURE:  Electronically signed by Jacqueline Ahumada DO on 5/6/21 at 11:50 AM EDT

## 2021-05-03 NOTE — PLAN OF CARE
Problem: Falls - Risk of:  Goal: Will remain free from falls  Description: Will remain free from falls  5/3/2021 1052 by Josh Poe RN  Outcome: Ongoing  Note: Patient is fall risk per fall scale. Hourly rounding performed. Personal belongings and call light within reach. Bed in low position. Patient in agreement to call out for assistance.    5/2/2021 2316 by Katie Ricketts RN  Outcome: Ongoing  Goal: Absence of physical injury  Description: Absence of physical injury  5/3/2021 1052 by Josh Poe RN  Outcome: Ongoing  5/2/2021 2316 by Katie Ricketts RN  Outcome: Ongoing     Problem: Skin Integrity:  Goal: Will show no infection signs and symptoms  Description: Will show no infection signs and symptoms  5/3/2021 1052 by Josh Poe RN  Outcome: Ongoing  5/2/2021 2316 by Katie Ricketts RN  Outcome: Ongoing  Goal: Absence of new skin breakdown  Description: Absence of new skin breakdown  5/3/2021 1052 by Josh Poe RN  Outcome: Ongoing  Note:   Q2 turn PRN  Meiplex to coccyx due to redness      5/2/2021 2316 by Katie Ricketts RN  Outcome: Ongoing

## 2021-05-03 NOTE — PROGRESS NOTES
1936: Patient temp. 100.2. Tylenol 650 mg Po at 2004. 2230: Temp. Recheck 100.3, writer gave patient ice pack, and cold wash cloth. Will recheck temp and will give Tylenol when next due.

## 2021-05-03 NOTE — CARE COORDINATION
Discharge planning    Patient chart reviewed. Patient worked with therapy and recommending home with therapy. Met with patient and he is agreeable. [facility] list was provided on initial assessment and has had [facility] in past. Would like referral to them again    Notified Elida koo [facility] and [facility] referral made. Initiated NADRESSA. Patient will need home 02 evaluation prior to discharge.  Admit for pna but h/o CHF and COPD    ANDRESSA

## 2021-05-03 NOTE — PROGRESS NOTES
Spoke with Alisa Finer, daughter, family would not like  consulted during admission. Dr. Kylah Sanz notified as well.

## 2021-05-03 NOTE — PROGRESS NOTES
Occupational Therapy   Occupational Therapy Initial Assessment  Date: 5/3/2021   Patient Name: Gabriela Zayas  MRN: [id number]     : 1945    Date of Service: 5/3/2021    Discharge Recommendations: Due to recent hospitalization and medical condition, pt would benefit from additional therapy at time of discharge to ensure safety. Please refer to the AM-PAC score for current functional status. Gabriela Zayas is a 77 y.o. male who presents to the emergency department complaining of fever, chills, shortness of breath, and lower abdominal \"burning pain\". He has been feeling this way for the past 30 hours. He has had no vomiting. He reports he has not been able to get out of bed because of the fever and chills. He was found at home to be hypoxic to 88%. He has never been diagnosed with Covid and has not and does not plan on getting the Covid vaccine as he does not trust it. RN reports patient is medically stable for therapy treatment this date. Chart reviewed prior to treatment and patient is agreeable for therapy. All lines intact and patient positioned comfortably at end of treatment. All patient needs addressed prior to ending therapy session. Assessment   Performance deficits / Impairments: Decreased functional mobility ; Decreased ADL status; Decreased strength;Decreased safe awareness;Decreased cognition;Decreased endurance;Decreased posture;Decreased balance  Assessment: . Prognosis: Good  Decision Making: High Complexity  OT Education: OT Role;Plan of Care;Home Exercise Program;Precautions; ADL Adaptive Strategies;Transfer Training;Energy Conservation;Equipment; Family Education  REQUIRES OT FOLLOW UP: Yes  Activity Tolerance  Activity Tolerance: Patient Tolerated treatment well;Patient limited by fatigue  Safety Devices  Safety Devices in place: Yes  Type of devices: Bed alarm in place; All fall risk precautions in place; Left in bed;Nurse notified;Call light within reach;Gait belt;Patient at risk for falls           Patient Diagnosis(es): The encounter diagnosis was Pneumonia due to organism. has a past medical history of Acute superficial gastritis without hemorrhage, Anastomotic stricture of stomach, Asthma, Atrial fibrillation ([address] Calculus of gallbladder without cholecystitis without obstruction, Chronic diastolic heart failure (HCC), Chronic rhinosinusitis, Class 2 severe obesity due to excess calories with serious comorbidity and body mass index (BMI) of 36.0 to 36.9 in [facility]), COPD (chronic obstructive pulmonary disease) ([address] E. coli septicemia ([address] E. coli UTI, Foot drop, right, Fracture of femur ([address] Gait disorder, Gastric out let obstruction, GERD (gastroesophageal reflux disease), Hallux valgus, acquired, bilateral, Hyperlipidemia, Hypertension, Impaired hearing, Internal hemorrhoids, Lymphedema of both lower extremities, Nausea & vomiting, OA (osteoarthritis) of knee, bilateral, Obesity, MARIAMA on CPAP, Osteoarthritis, Osteoarthritis of lumbar spine, S/P revision of total knee, Septicemia due to E. coli ([address] Sick sinus syndrome ([address] Simple chronic bronchitis ([address] Slow transit constipation, Unspecified sleep apnea, and UTI (urinary tract infection). has a past surgical history that includes Finger amputation (1966); Gastric bypass surgery (1985); Carpal tunnel release; Colonoscopy; Rotator cuff repair; joint replacement (2004 & 2005); Hemorrhoid surgery; pr egd transoral biopsy single/multiple (N/A, 05/25/2018); Upper gastrointestinal endoscopy (08/13/2018); Upper gastrointestinal endoscopy (N/A, 08/13/2018); Upper gastrointestinal endoscopy (N/A, 08/13/2018); Upper gastrointestinal endoscopy (08/16/2018); pr egd flexible foreign body removal (N/A, 08/16/2018); Upper gastrointestinal endoscopy (08/16/2018); Upper gastrointestinal endoscopy (N/A, 10/01/2018); Upper gastrointestinal endoscopy (10/01/2018);  Upper gastrointestinal endoscopy (N/A, 11/19/2018); Cystoscopy (01/02/2019); Cystoscopy (N/A, 01/02/2019); Upper gastrointestinal endoscopy (N/A, 10/15/2020); Colonoscopy (N/A, 04/05/2021); and Pacemaker insertion (04/09/2021). Restrictions  Restrictions/Precautions  Restrictions/Precautions: General Precautions, Fall Risk  Required Braces or Orthoses?: Yes  Required Braces or Orthoses  Right Lower Extremity Brace: Ankle Foot Orthotics  Left Lower Extremity Brace: Erika Foot Orthotics  Position Activity Restriction  Other position/activity restrictions: IV, continuous SP02, bilateral AFO    Subjective   General  Chart Reviewed: Yes  Patient assessed for rehabilitation services?: Yes  Family / Caregiver Present: No  Vital Signs  Temp: 98.1 °F (36.7 °C)  Temp Source: Oral  Pulse: 75  Heart Rate Source: Monitor  Resp: 18  BP: (!) 110/54  BP Location: Right Arm  MAP (mmHg): 66  Oxygen Therapy  SpO2: 100 %  O2 Device: Nasal cannula  Social/Functional History  Social/Functional History  Lives With: Spouse  Type of Home: House  Home Layout: Able to Live on Main level with bedroom/bathroom, Two level  Home Access: Stairs to enter with rails  Entrance Stairs - Number of Steps: 5  Entrance Stairs - Rails: Right  Bathroom Shower/Tub: Tub/Shower unit, Shower chair with back  Bathroom Toilet: Standard  Bathroom Equipment: Grab bars in shower, Toilet raiser  Home Equipment: (no O2 at home, but uses Cpap at night.)  ADL Assistance: Independent  Homemaking Assistance: Needs assistance(wife/daughters mostly)  Ambulation Assistance: Independent  Transfer Assistance: Independent  Active : No  Patient's  Info:  Son or daughter drives  Occupation: Retired  Type of occupation: Special educationTeacher  Leisure & Hobbies: Paint  Additional Comments: No falls in the last 6 months       Objective   Vision: Impaired  Vision Exceptions: Wears glasses at all times  Hearing: Exceptions to [facility]  Hearing Exceptions: Hard of hearing/hearing concerns;Bilateral hearing aid Orientation  Overall Orientation Status: Within Functional Limits  Observation/Palpation  Posture: Poor(flexed posture with ambulation)  Observation: SOB with minimal activity, but SP02 remained . 92% on 2 liters 02. Balance  Sitting Balance: Stand by assistance  Standing Balance: Contact guard assistance  Functional Mobility  Functional - Mobility Device: Rolling Walker  Activity: To/from bathroom  Assist Level: Contact guard assistance  Functional Mobility Comments: cues for safety wwith RW karson with turns and navigating obstacles. Toilet Transfers  Equipment Used: Standard toilet  Toilet Transfer: Contact guard assistance  ADL  Feeding: Independent;Setup  Grooming: Contact guard assistance;Minimal assistance(for standing at sink for grooming tasks. Pt fatigues easily, needs cue for posture and keeping head up)  UE Bathing: Minimal assistance  LE Bathing: Moderate assistance;Minimal assistance  UE Dressing: Minimal assistance  LE Dressing: Moderate assistance;Minimal assistance  Toileting: Minimal assistance  Additional Comments: Pt is limited by dec. activity tolerance, SOB/fatiguing easily. Pt cued on posture, pacing, need for rest breaks, and pursed lip breathing  Tone RUE  RUE Tone: Normotonic  Tone LUE  LUE Tone: Normotonic  Coordination  Movements Are Fluid And Coordinated: Yes     Bed mobility  Rolling to Left: Supervision  Rolling to Right: Supervision  Supine to Sit: Supervision  Comment: pt demo good use of bed rails.   Transfers  Sit to stand: Contact guard assistance  Stand to sit: Contact guard assistance  Transfer Comments: cues for RW safety     Cognition  Overall Cognitive Status: WNL  Perception  Overall Perceptual Status: WFL     Sensation  Overall Sensation Status: Impaired(Neuropathy in feet, gets cramoing in hands)        LUE AROM (degrees)  LUE AROM : WFL  RUE AROM (degrees)  RUE AROM : WFL  LUE Strength  Gross LUE Strength: WFL  LUE Strength Comment: CELESTE SHORT's 4/5  RUE Strength  Gross RUE Strength: WFL                   Plan   Plan  Times per week: 4-5x/week, 1-2x/day  Current Treatment Recommendations: Strengthening, Balance Training, Functional Mobility Training, Endurance Training, Safety Education & Training, Self-Care / ADL, Patient/Caregiver Education & Training, Equipment Evaluation, Education, & procurement       AM-PAC Inpatient Daily Activity Raw Score: 17 (05/03/21 1643)  AM-PAC Inpatient ADL T-Scale Score : 37.26 (05/03/21 1643)  ADL Inpatient CMS 0-100% Score: 50.11 (05/03/21 1643)  ADL Inpatient CMS G-Code Modifier : CK (05/03/21 1643)    Goals  Short term goals  Time Frame for Short term goals: by discharge, pt will  Short term goal 1: demo S/MI with ADL transfers with good safety and AD/DME as needed  Short term goal 2: demo S/MI with functional mob in room distances for ADL completion with good safety/pacing  Short term goal 3: demo S/MI with toileting routine with good safety  Short term goal 4: demo I with UB ADLs and SBA with LB ADLs with good safety/pacing, DME and AE as needed  Short term goal 5: demo and verb good understanding of fall prevention techs, EC/WS techs, possible equip needs, breathing techs, and B UE HEP  Patient Goals   Patient goals : to go home       Therapy Time   Individual Concurrent Group Co-treatment   Time In 1500         Time Out 1535         Minutes 35          treatment min:25           Rhonda Williamson OT

## 2021-05-04 LAB
ANION GAP SERPL CALCULATED.3IONS-SCNC: 7 MMOL/L (ref 9–17)
BUN BLDV-MCNC: 11 MG/DL (ref 8–23)
BUN/CREAT BLD: 16 (ref 9–20)
CALCIUM SERPL-MCNC: 8.6 MG/DL (ref 8.6–10.4)
CHLORIDE BLD-SCNC: 101 MMOL/L (ref 98–107)
CO2: 27 MMOL/L (ref 20–31)
CREAT SERPL-MCNC: 0.69 MG/DL (ref 0.7–1.2)
GFR AFRICAN AMERICAN: >60 ML/MIN
GFR NON-AFRICAN AMERICAN: >60 ML/MIN
GFR SERPL CREATININE-BSD FRML MDRD: ABNORMAL ML/MIN/{1.73_M2}
GFR SERPL CREATININE-BSD FRML MDRD: ABNORMAL ML/MIN/{1.73_M2}
GLUCOSE BLD-MCNC: 112 MG/DL (ref 70–99)
HCT VFR BLD CALC: 30.9 % (ref 40.7–50.3)
HEMOGLOBIN: 9.8 G/DL (ref 13–17)
MCH RBC QN AUTO: 31.5 PG (ref 25.2–33.5)
MCHC RBC AUTO-ENTMCNC: 31.7 G/DL (ref 28.4–34.8)
MCV RBC AUTO: 99.4 FL (ref 82.6–102.9)
NRBC AUTOMATED: 0 PER 100 WBC
PDW BLD-RTO: 13 % (ref 11.8–14.4)
PLATELET # BLD: 100 K/UL (ref 138–453)
PMV BLD AUTO: 9.2 FL (ref 8.1–13.5)
POTASSIUM SERPL-SCNC: 3.7 MMOL/L (ref 3.7–5.3)
RBC # BLD: 3.11 M/UL (ref 4.21–5.77)
SODIUM BLD-SCNC: 135 MMOL/L (ref 135–144)
WBC # BLD: 5.4 K/UL (ref 3.5–11.3)

## 2021-05-04 PROCEDURE — 97530 THERAPEUTIC ACTIVITIES: CPT

## 2021-05-04 PROCEDURE — 97535 SELF CARE MNGMENT TRAINING: CPT

## 2021-05-04 PROCEDURE — 85027 COMPLETE CBC AUTOMATED: CPT

## 2021-05-04 PROCEDURE — 2580000003 HC RX 258: Performed by: INTERNAL MEDICINE

## 2021-05-04 PROCEDURE — 2060000000 HC ICU INTERMEDIATE R&B

## 2021-05-04 PROCEDURE — 94640 AIRWAY INHALATION TREATMENT: CPT

## 2021-05-04 PROCEDURE — 2700000000 HC OXYGEN THERAPY PER DAY

## 2021-05-04 PROCEDURE — 6360000002 HC RX W HCPCS: Performed by: INTERNAL MEDICINE

## 2021-05-04 PROCEDURE — 80048 BASIC METABOLIC PNL TOTAL CA: CPT

## 2021-05-04 PROCEDURE — 36415 COLL VENOUS BLD VENIPUNCTURE: CPT

## 2021-05-04 PROCEDURE — 97110 THERAPEUTIC EXERCISES: CPT

## 2021-05-04 PROCEDURE — 6370000000 HC RX 637 (ALT 250 FOR IP): Performed by: INTERNAL MEDICINE

## 2021-05-04 PROCEDURE — 99232 SBSQ HOSP IP/OBS MODERATE 35: CPT | Performed by: INTERNAL MEDICINE

## 2021-05-04 PROCEDURE — 97116 GAIT TRAINING THERAPY: CPT

## 2021-05-04 PROCEDURE — 97112 NEUROMUSCULAR REEDUCATION: CPT

## 2021-05-04 PROCEDURE — 94761 N-INVAS EAR/PLS OXIMETRY MLT: CPT

## 2021-05-04 PROCEDURE — 94669 MECHANICAL CHEST WALL OSCILL: CPT

## 2021-05-04 RX ADMIN — POTASSIUM CHLORIDE 20 MEQ: 1500 TABLET, EXTENDED RELEASE ORAL at 17:49

## 2021-05-04 RX ADMIN — SODIUM CHLORIDE, PRESERVATIVE FREE 10 ML: 5 INJECTION INTRAVENOUS at 09:28

## 2021-05-04 RX ADMIN — OXYCODONE HYDROCHLORIDE AND ACETAMINOPHEN 500 MG: 500 TABLET ORAL at 09:27

## 2021-05-04 RX ADMIN — PANCRELIPASE 24000 UNITS: 24000; 76000; 120000 CAPSULE, DELAYED RELEASE PELLETS ORAL at 09:29

## 2021-05-04 RX ADMIN — IPRATROPIUM BROMIDE AND ALBUTEROL SULFATE 1 AMPULE: .5; 3 SOLUTION RESPIRATORY (INHALATION) at 18:23

## 2021-05-04 RX ADMIN — BUMETANIDE 1 MG: 1 TABLET ORAL at 09:27

## 2021-05-04 RX ADMIN — BUDESONIDE AND FORMOTEROL FUMARATE DIHYDRATE 2 PUFF: 160; 4.5 AEROSOL RESPIRATORY (INHALATION) at 18:23

## 2021-05-04 RX ADMIN — IPRATROPIUM BROMIDE AND ALBUTEROL SULFATE 1 AMPULE: .5; 3 SOLUTION RESPIRATORY (INHALATION) at 10:19

## 2021-05-04 RX ADMIN — ENOXAPARIN SODIUM 40 MG: 40 INJECTION SUBCUTANEOUS at 09:27

## 2021-05-04 RX ADMIN — GABAPENTIN 300 MG: 300 CAPSULE ORAL at 20:01

## 2021-05-04 RX ADMIN — BUDESONIDE AND FORMOTEROL FUMARATE DIHYDRATE 2 PUFF: 160; 4.5 AEROSOL RESPIRATORY (INHALATION) at 06:11

## 2021-05-04 RX ADMIN — CETIRIZINE HYDROCHLORIDE 5 MG: 5 TABLET, FILM COATED ORAL at 09:27

## 2021-05-04 RX ADMIN — PANTOPRAZOLE SODIUM 40 MG: 40 TABLET, DELAYED RELEASE ORAL at 05:35

## 2021-05-04 RX ADMIN — POLYVINYL ALCOHOL 1 DROP: 14 SOLUTION/ DROPS OPHTHALMIC at 22:01

## 2021-05-04 RX ADMIN — GABAPENTIN 300 MG: 300 CAPSULE ORAL at 14:08

## 2021-05-04 RX ADMIN — FERROUS SULFATE TAB EC 325 MG (65 MG FE EQUIVALENT) 325 MG: 325 (65 FE) TABLET DELAYED RESPONSE at 17:45

## 2021-05-04 RX ADMIN — ROPINIROLE HYDROCHLORIDE 0.25 MG: 0.25 TABLET, FILM COATED ORAL at 09:27

## 2021-05-04 RX ADMIN — ROPINIROLE HYDROCHLORIDE 0.25 MG: 0.25 TABLET, FILM COATED ORAL at 20:01

## 2021-05-04 RX ADMIN — IPRATROPIUM BROMIDE AND ALBUTEROL SULFATE 1 AMPULE: .5; 3 SOLUTION RESPIRATORY (INHALATION) at 06:06

## 2021-05-04 RX ADMIN — IPRATROPIUM BROMIDE AND ALBUTEROL SULFATE 1 AMPULE: .5; 3 SOLUTION RESPIRATORY (INHALATION) at 14:22

## 2021-05-04 RX ADMIN — LEVOFLOXACIN 750 MG: 5 INJECTION, SOLUTION INTRAVENOUS at 17:45

## 2021-05-04 RX ADMIN — ACETAMINOPHEN 650 MG: 325 TABLET ORAL at 04:16

## 2021-05-04 RX ADMIN — GABAPENTIN 300 MG: 300 CAPSULE ORAL at 09:27

## 2021-05-04 RX ADMIN — FERROUS SULFATE TAB EC 325 MG (65 MG FE EQUIVALENT) 325 MG: 325 (65 FE) TABLET DELAYED RESPONSE at 09:33

## 2021-05-04 RX ADMIN — ONDANSETRON 4 MG: 2 INJECTION INTRAMUSCULAR; INTRAVENOUS at 20:00

## 2021-05-04 RX ADMIN — TAMSULOSIN HYDROCHLORIDE 0.4 MG: 0.4 CAPSULE ORAL at 20:01

## 2021-05-04 RX ADMIN — PANCRELIPASE 24000 UNITS: 24000; 76000; 120000 CAPSULE, DELAYED RELEASE PELLETS ORAL at 17:45

## 2021-05-04 RX ADMIN — POLYVINYL ALCOHOL 1 DROP: 14 SOLUTION/ DROPS OPHTHALMIC at 12:15

## 2021-05-04 RX ADMIN — TIZANIDINE 4 MG: 4 TABLET ORAL at 21:40

## 2021-05-04 RX ADMIN — POTASSIUM CHLORIDE 20 MEQ: 1500 TABLET, EXTENDED RELEASE ORAL at 09:33

## 2021-05-04 RX ADMIN — METOPROLOL SUCCINATE 50 MG: 50 TABLET, EXTENDED RELEASE ORAL at 20:01

## 2021-05-04 RX ADMIN — MULTIVITAMIN TABLET 1 TABLET: TABLET at 09:27

## 2021-05-04 RX ADMIN — SODIUM CHLORIDE, PRESERVATIVE FREE 10 ML: 5 INJECTION INTRAVENOUS at 20:09

## 2021-05-04 RX ADMIN — PANCRELIPASE 24000 UNITS: 24000; 76000; 120000 CAPSULE, DELAYED RELEASE PELLETS ORAL at 12:15

## 2021-05-04 RX ADMIN — POLYVINYL ALCOHOL 1 DROP: 14 SOLUTION/ DROPS OPHTHALMIC at 17:46

## 2021-05-04 RX ADMIN — SODIUM CHLORIDE, PRESERVATIVE FREE 10 ML: 5 INJECTION INTRAVENOUS at 20:00

## 2021-05-04 RX ADMIN — VITAM B12 100 MCG: 100 TAB at 09:27

## 2021-05-04 ASSESSMENT — PAIN SCALES - GENERAL: PAINLEVEL_OUTOF10: 5

## 2021-05-04 ASSESSMENT — ENCOUNTER SYMPTOMS
VOMITING: 0
SHORTNESS OF BREATH: 1
COUGH: 1
ABDOMINAL PAIN: 0
NAUSEA: 0

## 2021-05-04 ASSESSMENT — PAIN DESCRIPTION - DESCRIPTORS: DESCRIPTORS: ACHING

## 2021-05-04 ASSESSMENT — PAIN DESCRIPTION - ONSET: ONSET: AWAKENED FROM SLEEP

## 2021-05-04 ASSESSMENT — PAIN DESCRIPTION - PAIN TYPE: TYPE: ACUTE PAIN

## 2021-05-04 NOTE — PROGRESS NOTES
[facility]    Office: Dominik Joseph, DO, Kaushik Martin, DO, Dara Kingston, DO, Prabhu Jacobo Blood, DO, Juan Salgado MD, Ailyn Pittman MD, Latisha Kwok MD, Willette Klinefelter, MD, Margarita Adams MD, Aline Howard MD, Rosy Nova MD, Jonah Oneill MD, Marisela Waldrop MD, Gelacio Garcia, DO, Brian Rdz MD, Sunita Pemberton MD, Trena Avery, DO, Richard Rouse MD,  Zeus Juarez DO, Shaun Broussard MD, Mia Doyle MD, Whitley Pro, [facility], [facility], CNP, Devon Hemp, CNP, Hannah Mcdonald, CNS, Leonora Hagen, CNP, Kimberli January, CNP, Louis Morales, CNP, Hernandez Treviño, CNP, Loretta Bear, CNP, Valdemar Partida PA-C, Francisco Polk, [facility], Ankur Urbina, CNP, Sally Christianson, CNP, Jesus Johnson, CNP, Marland Cowden, CNP, Bharati Arechiga, [facility]    Progress Note    5/4/2021    6:45 PM    Name:   Carmelo Herrera  MRN:     [id number]     [place]:      [de-identified]   Room:   [address] [place] [address] Day:  3  Admit Date:  5/1/2021  1:48 PM    PCP:   JULIAN Navarro CNP  Code Status:  Full Code    Subjective:     C/C:   Chief Complaint   Patient presents with    Fever     onset last night    Shortness of Breath    Nausea & Vomiting       Interval History Status:  Improved  Less short of breath  Still somewhat dizzy  Cough productive of yellow sputum  Edema persists  \"Does not feel ready to go home \"    Data Base Updates:  WBC5.4k/uL RBC3. 11Low m/uL Hemoglobin9.8Low     Mochcis633Wlex mg/dL     ARJ67ws/dL CREATININE0. 69Low     Specimen Source: Blood Specimen Description. BLOOD Special RequestsLT AC CultureNO GROWTH 3 DAYS     O2 sats satisfactory    Brief History:     As documented in the medical record:  Fartun Treadwell is a 77 y.o. male who presents to the emergency department complaining of fever, chills, shortness of breath, and lower abdominal \"burning pain\". He has been feeling this way for the past 30 hours.   He has had no vomiting. He reports he has not been able to get out of bed because of the fever and chills. He was found at home to be hypoxic to 88%. He has never been diagnosed with Covid and has not and does not plan on getting the Covid vaccine as he does not trust it. He had [employer] dual-chamber pacemaker placed on 4/9/2021 secondary to sick sinus syndrome, later he had pacemaker pocket hematoma due to which Xarelto was stopped which he was supposed to restart after visiting cardiologist in the office, it is not clear if he restarted Xarelto or not. His history of previous gastric bypass surgery, atrial fibrillation which is chronic, chronic diastolic CHF, obstructive sleep apnea with CPAP use, COPD, essential hypertension, dyslipidemia, bilateral foot drop(wears braces to walk)  His rapid Covid is negative  Chest x-ray shows patchy bilateral airspace disease  His proBNP is 5141 which is chronically high and it was higher during the previous admission    The plan included:  Continue IV Levaquin  Follow pending culture results-negative so far  DVT prophylaxis  Continue aerosol treatments, inhaled steroids and oxygen  PT OT, check orthostatics\"     Medications: Allergies:     Allergies   Allergen Reactions    Darvocet [Propoxyphene N-Acetaminophen] Hives    Other Hives    Oxycodone-Acetaminophen     Propoxyphene      Other reaction(s): Unknown       Current Meds:   Scheduled Meds:    Armodafinil  1 tablet Mouth/Throat BID    budesonide-formoterol  2 puff Inhalation BID    polyvinyl alcohol  1 drop Both Eyes 4x Daily    ferrous sulfate  325 mg Oral BID WC    ascorbic acid  500 mg Oral Daily    fluticasone  2 spray Nasal Daily    potassium chloride  20 mEq Oral BID    multivitamin  1 tablet Oral Daily    pantoprazole  40 mg Oral Daily    lipase-protease-amylase  24,000 Units Oral TID     rOPINIRole  0.25 mg Oral BID    tamsulosin  0.4 mg Oral Daily    vitamin B-12  100 mcg Oral Daily    gabapentin  300 mg Oral TID    metoprolol succinate  50 mg Oral Nightly    bumetanide  1 mg Oral Daily    cetirizine  5 mg Oral Daily    sodium chloride flush  5-40 mL Intravenous 2 times per day    enoxaparin  40 mg Subcutaneous Daily    ipratropium-albuterol  1 ampule Inhalation Q4H WA    levofloxacin  750 mg Intravenous Q24H     Continuous Infusions:    sodium chloride       PRN Meds: benzonatate, tiZANidine, sodium chloride flush, sodium chloride, nicotine, promethazine **OR** ondansetron, magnesium hydroxide, acetaminophen **OR** acetaminophen, albuterol    Data:     Past Medical History:   has a past medical history of Acute superficial gastritis without hemorrhage, Anastomotic stricture of stomach, Asthma, Atrial fibrillation ([facility]), Calculus of gallbladder without cholecystitis without obstruction, Chronic diastolic heart failure ([facility]), Chronic rhinosinusitis, Class 2 severe obesity due to excess calories with serious comorbidity and body mass index (BMI) of 36.0 to 36.9 in [facility]), COPD (chronic obstructive pulmonary disease) ([facility] E. coli septicemia ([facility] E. coli UTI, Foot drop, right, Fracture of femur ([facility] Gait disorder, Gastric out let obstruction, GERD (gastroesophageal reflux disease), Hallux valgus, acquired, bilateral, Hyperlipidemia, Hypertension, Impaired hearing, Internal hemorrhoids, Lymphedema of both lower extremities, Nausea & vomiting, OA (osteoarthritis) of knee, bilateral, Obesity, MARIAMA on CPAP, Osteoarthritis, Osteoarthritis of lumbar spine, S/P revision of total knee, Septicemia due to E. coli ([facility]), Sick sinus syndrome ([facility]), Simple chronic bronchitis (HCC), Slow transit constipation, Unspecified sleep apnea, and UTI (urinary tract infection). Social History:   reports that he quit smoking about 44 years ago. He has a 20.00 pack-year smoking history. He has never used smokeless tobacco. He reports that he does not drink alcohol or use drugs.      Family History:   Family (37.4 °C)    No results for input(s): POCGLU in the last 72 hours. I/O (24Hr): Intake/Output Summary (Last 24 hours) at 5/4/2021 1845  Last data filed at 5/4/2021 1836  Gross per 24 hour   Intake 1750 ml   Output 2200 ml   Net -450 ml       Labs:  Hematology:  Recent Labs     05/02/21  0550 05/04/21  0513   WBC 7.7 5.4   RBC 3.20* 3.11*   HGB 10.1* 9.8*   HCT 32.2* 30.9*   .6 99.4   MCH 31.6 31.5   MCHC 31.4 31.7   RDW 13.0 13.0   * 100*   MPV 8.6 9.2     Chemistry:  Recent Labs     05/02/21  0550 05/04/21  0513    135   K 4.0 3.7    101   CO2 29 27   GLUCOSE 107* 112*   BUN 15 11   CREATININE 0.85 0.69*   ANIONGAP 7* 7*   LABGLOM >60 >60   GFRAA >60 >60   CALCIUM 8.6 8.6   No results for input(s): PROT, LABALBU, LABA1C, O6DKVOD, B1ODLED, FT4, TSH, AST, ALT, LDH, GGT, ALKPHOS, LABGGT, BILITOT, BILIDIR, AMMONIA, AMYLASE, LIPASE, LACTATE, CHOL, HDL, LDLCHOLESTEROL, CHOLHDLRATIO, TRIG, VLDL, NLZ39RC, PHENYTOIN, PHENYF, URICACID, POCGLU in the last 72 hours. ABG:  Lab Results   Component Value Date    FIO2 NOT REPORTED 05/01/2021     Lab Results   Component Value Date/Time    SPECIAL NOT REPORTED 05/01/2021 06:00 PM     Lab Results   Component Value Date/Time    CULTURE NO SAMPLE RECEIVED 05/01/2021 06:00 PM       Radiology:  Xr Chest Portable    Result Date: 5/2/2021  Right upper lung field opacity appears more prominent, otherwise no significant change. Xr Chest Portable    Result Date: 5/1/2021  Patchy bilateral airspace disease suggestive of multifocal inflammatory process or infection.          Assessment:        Principal Problem:    Pneumonia-community-acquired  Active Problems:    Chronic atrial fibrillation (HCC)    Gastroesophageal reflux disease without esophagitis    Foot drop, right    Hearing impairment    Essential hypertension    Pneumonia due to organism    COPD (chronic obstructive pulmonary disease) (HCC)    Chronic diastolic heart failure (HCC)    BPH (benign prostatic hyperplasia)    Atrial fibrillation with slow ventricular response (HCC)    Acute hypoxemic respiratory failure (HCC)    SIRS (systemic inflammatory response syndrome) ([facility]  Resolved Problems:    * No resolved hospital problems. *      Plan:        Antibiotics  Respiratory therapy  Reflux precautions  Monitor and control blood pressure  PT/OT  Monitor platelet counts  Reflux precautions  Chest x-ray a.m.   Hope to transition to oral therapy and discharge planning 5/5    IP CONSULT  [address], DO  5/4/2021  6:45 PM

## 2021-05-04 NOTE — PROGRESS NOTES
Physical Therapy  Facility/Department: [facility] PROGRESSIVE CARE  Daily Treatment Note  NAME: Julio C Coffey  : 1945  MRN: [id number]    Date of Service: 2021    Discharge Recommendations:  Patient would benefit from continued therapy after discharge   Due to recent hospitalization and medical condition, pt would benefit from additional therapy at time of discharge to ensure safety. Please refer to the AM-PAC score for current functional status. PT Equipment Recommendations  Equipment Needed: No    Assessment   Body structures, Functions, Activity limitations: Decreased functional mobility ; Decreased strength;Decreased endurance;Decreased sensation;Decreased balance;Decreased posture  Assessment: Patient motivated to work with therapy. Patient required min assist to don AFOs, patient reports he is typically indep with donning and doffing. Patient demo decline in gait, transfers, ablance, and stregnth and benefit from PT to return to prior level of function. Patient demo good safety jusgement. Prognosis: Good  PT Education: Goals;PT Role;Plan of Care;Disease Specific Education;General Safety  Patient Education: Patient educated to deep breathing activities and benefits of being OOB. Patient demo good understanding. Activity Tolerance  Activity Tolerance: Patient limited by endurance  Activity Tolerance: Patient SOB with minimal ambulation and with donning AFOS     Patient Diagnosis(es): The encounter diagnosis was Pneumonia due to organism.      has a past medical history of Acute superficial gastritis without hemorrhage, Anastomotic stricture of stomach, Asthma, Atrial fibrillation ([facility] Calculus of gallbladder without cholecystitis without obstruction, Chronic diastolic heart failure (HCC), Chronic rhinosinusitis, Class 2 severe obesity due to excess calories with serious comorbidity and body mass index (BMI) of 36.0 to 36.9 in adult [facility]), COPD (chronic obstructive pulmonary disease) ([facility] E. coli Yes  Additional Pertinent Hx: COPD, LE lymphedema, Foot drop with AFO from back issues, SSS, CERD, a-fib, gastric bypass  Response To Previous Treatment: Not applicable  Family / Caregiver Present: No  Subjective  Subjective: Patient very pleasant and agreeable to PT. Patient reports in hospital from 4/7/21 to 4/10/21 for pacemaker placement and returned 4/11 to 9/18 due to complications with hematoma at pacemaker site. Patient reports he has been getting weaker over the last weak and then vomited with c-pap mask on resulting in aspiration. General Comment  Comments: Sophie Chaudhary RN reports patient medically appropriate for PT. Orientation     Cognition   Cognition  Overall Cognitive Status: WNL  Objective         Ambulation  Ambulation?: Yes  More Ambulation?: No  Ambulation 1  Surface: level tile  Device: Rolling Walker  Assistance: Minimal assistance  Quality of Gait: Flexed posture, very wide JACOB, SOB with ambulation, Bilateral AFOs on  Gait Deviations: Slow Zoie; Increased JACOB; Decreased step length;Decreased step height;Decreased head and trunk rotation  Distance: 20' x 3  Comments: 3 Laps in room complete but required 5 min rest breaks due to SOB, SpO2 WNLs throughout treatment. Stairs/Curb  Stairs?: No  Neuromuscular Education  NDT Treatment: Gait ;Sitting;Standing  Balance  Sitting - Static: Good  Sitting - Dynamic: Good  Standing - Static: Fair  Standing - Dynamic: Fair;-  Comments: w/ RW  Exercises: Created, issued and reviewed seated HEP. Performed seated ronaldo LE AROM x 10 reps. Attempted standing HEP with RW for support, but patient too tired and has significant LOB and poor coordination. Patient agreeable to try again tomorrow. Comments: Access Code: [email or web address]: [employer]. Selenokhod/Prepared by: Berenice Kam  Exercises    Seated Scapular Retraction - 1 x daily - 7 x weekly - 10 reps - 3 sets    Seated Ankle Pumps - 1 x daily - 7 x weekly - 10 reps - 3 sets    Seated Gluteal Sets - 1 x daily - 7 x weekly - 10 reps - 3 sets    Seated Long Arc Quad - 1 x daily - 7 x weekly - 10 reps - 3 sets    Seated March - 1 x daily - 7 x weekly - 10 reps - 3 sets    Seated Hip Abduction - 1 x daily - 7 x weekly - 10 reps - 3 sets    Seated Pursed Lip Breathing - 1 x daily - 7 x weekly - 10 reps - 3 sets       AM-PAC Score  AM-PAC Inpatient Mobility Raw Score : 17 (05/04/21 1446)  AM-PAC Inpatient T-Scale Score : 42.13 (05/04/21 1446)  Mobility Inpatient CMS 0-100% Score: 50.57 (05/04/21 1446)  Mobility Inpatient CMS G-Code Modifier : CK (05/04/21 1446)          Goals  Short term goals  Time Frame for Short term goals: 12 visits  Short term goal 1: Patient will be indep with bed mobility and transfers. Short term goal 2: Patient will amb 200 feet with RW and AFOs and supervision. Short term goal 3: Patient will negotiate 5 stairs with rail and supervision. Short term goal 4: Patient will be indep with HEP. Short term goal 5: Patient will tolerate 30 minutes of ther-ex and ther-act. Patient Goals   Patient goals : Return home, OP PT    Plan    Plan  Times per week: 1-2x/d, 5-6 d/wk  Current Treatment Recommendations: Strengthening, Balance Training, Functional Mobility Training, Transfer Training, Stair training, Gait Training, Endurance Training, Patient/Caregiver Education & Training, Home Exercise Program, Safety Education & Training  Safety Devices  Type of devices:  All fall risk precautions in place, Gait belt, Nurse notified, Call light within reach, Left in chair  Restraints  Initially in place: No     Therapy Time   Individual Concurrent Group Co-treatment   Time In 1136         Time Out 1231         Minutes Olismela Golden[address]

## 2021-05-04 NOTE — PLAN OF CARE
Problem: Falls - Risk of:  Goal: Will remain free from falls  Description: Will remain free from falls  Outcome: Ongoing     Patient has remained free from falls this shift. Patient is alert and oriented times four. Bed to lowest position with door open. Patient care items and call light in reach. Patient uses call light appropriately for assist. Will continue to monitor. Please see fall assessment.

## 2021-05-04 NOTE — PROGRESS NOTES
Occupational Therapy  Facility/Department: [facility] PROGRESSIVE CARE  Daily Treatment Note  NAME: Beatrice Beltran  : 1945  MRN: [id number]    Date of Service: 2021    Discharge Recommendations:  Patient would benefit from continued therapy after discharge     Due to recent hospitalization and medical condition, pt would benefit from additional therapy at time of discharge to ensure safety. Please refer to the AM-PAC score for current functional status. Assessment   Performance deficits / Impairments: Decreased functional mobility ; Decreased ADL status; Decreased strength;Decreased safe awareness;Decreased cognition;Decreased endurance;Decreased posture;Decreased balance  Prognosis: Good  OT Education: OT Role;Plan of Care;Home Exercise Program;Precautions; ADL Adaptive Strategies;Transfer Training;Energy Conservation;Equipment; Family Education  REQUIRES OT FOLLOW UP: Yes  Activity Tolerance  Activity Tolerance: Patient Tolerated treatment well  Safety Devices  Safety Devices in place: Yes  Type of devices: All fall risk precautions in place;Nurse notified;Call light within reach;Gait belt;Patient at risk for falls; Chair alarm in place; Left in chair         Patient Diagnosis(es): The encounter diagnosis was Pneumonia due to organism.       has a past medical history of Acute superficial gastritis without hemorrhage, Anastomotic stricture of stomach, Asthma, Atrial fibrillation ([facility] [address] Calculus of gallbladder without cholecystitis without obstruction, Chronic diastolic heart failure (HCC), Chronic rhinosinusitis, Class 2 severe obesity due to excess calories with serious comorbidity and body mass index (BMI) of 36.0 to 36.9 in adult [facility]), COPD (chronic obstructive pulmonary disease) ([facility] [address] E. coli septicemia ([facility] [address] E. coli UTI, Foot drop, right, Fracture of femur ([facility] [address] Gait disorder, Gastric out let obstruction, GERD (gastroesophageal reflux disease), Hallux valgus, acquired, bilateral, Hyperlipidemia, Hypertension, Impaired hearing, Internal hemorrhoids, Lymphedema of both lower extremities, Nausea & vomiting, OA (osteoarthritis) of knee, bilateral, Obesity, MARIAMA on CPAP, Osteoarthritis, Osteoarthritis of lumbar spine, S/P revision of total knee, Septicemia due to E. coli (HCC), Sick sinus syndrome (HCC), Simple chronic bronchitis (HCC), Slow transit constipation, Unspecified sleep apnea, and UTI (urinary tract infection). has a past surgical history that includes Finger amputation (1966); Gastric bypass surgery (1985); Carpal tunnel release; Colonoscopy; Rotator cuff repair; joint replacement (2004 & 2005); Hemorrhoid surgery; pr egd transoral biopsy single/multiple (N/A, 05/25/2018); Upper gastrointestinal endoscopy (08/13/2018); Upper gastrointestinal endoscopy (N/A, 08/13/2018); Upper gastrointestinal endoscopy (N/A, 08/13/2018); Upper gastrointestinal endoscopy (08/16/2018); pr egd flexible foreign body removal (N/A, 08/16/2018); Upper gastrointestinal endoscopy (08/16/2018); Upper gastrointestinal endoscopy (N/A, 10/01/2018); Upper gastrointestinal endoscopy (10/01/2018); Upper gastrointestinal endoscopy (N/A, 11/19/2018); Cystoscopy (01/02/2019); Cystoscopy (N/A, 01/02/2019); Upper gastrointestinal endoscopy (N/A, 10/15/2020); Colonoscopy (N/A, 04/05/2021); and Pacemaker insertion (04/09/2021). Restrictions  Restrictions/Precautions  Restrictions/Precautions: General Precautions, Fall Risk  Required Braces or Orthoses?: Yes  Required Braces or Orthoses  Right Lower Extremity Brace: Ankle Foot Orthotics  Left Lower Extremity Brace:  Erika Foot Orthotics  Position Activity Restriction  Other position/activity restrictions: IV, continuous SPO2, bilateral AFO  Subjective   General  Chart Reviewed: Yes  Patient assessed for rehabilitation services?: Yes  Response to previous treatment: Patient with no complaints from previous session  Family / Caregiver Present: No      Orientation  Orientation  Overall Orientation Status: Within Functional Limits  Objective    ADL  Grooming: Setup;Stand by assistance(standing at sink to wash/comb hair and wash face)  UE Dressing: Setup;Stand by assistance        Balance  Sitting Balance: Supervision    Standing Balance: Contact guard assistance  Standing Balance  Time: standing tolerance ~2-3 minutes  Activity: grooming at sink  Comment: with RW    Functional Mobility  Functional - Mobility Device: Rolling Walker  Activity: To/from bathroom  Assist Level: Contact guard assistance  Functional Mobility Comments: Verbal cues for posture, environment scanning, use of BUE's on RW, navigating turns    Bed mobility  Comment: Patient up in recliner upon arrival    Transfers  Sit to stand: Minimal assistance  Stand to sit: Minimal assistance  Transfer Comments: Min physical assist, patient requires increased time. Good hand placements noted      Cognition  Overall Cognitive Status: WNL     Access Code: Gege Hyatt  URL: [email or web address]  Date: 05/04/2021  Prepared by: Sabrina Hickman    Exercises  Seated Scapular Retraction - 1 x daily - 7 x weekly - 10 reps - 3 sets  Seated Shoulder Shrugs - 1 x daily - 7 x weekly - 10 reps - 3 sets  Seated Shoulder External Rotation - 1 x daily - 7 x weekly - 10 reps - 3 sets  Arm-chair Push Up - 1 x daily - 7 x weekly - 10 reps - 3 sets  Seated Shoulder Flexion Full Range - 1 x daily - 7 x weekly - 10 reps - 3 sets  Seated Shoulder Abduction - Thumbs Up - 1 x daily - 7 x weekly - 10 reps - 3 sets  Seated Punches with Trunk Rotation - 1 x daily - 7 x weekly - 10 reps - 3 sets  Seated Single Arm Shoulder Abduction with Dumbbell - Thumb Up - 1 x daily - 7 x weekly - 10 reps - 3 sets  Seated Shoulder Horizontal Abduction - Thumbs Up - 1 x daily - 7 x weekly - 10 reps - 3 sets  Seated Shoulder Abduction with Dumbbells - Palms Down - 1 x daily - 7 x weekly - 10 reps - 3 sets  Seated Shoulder Abduction with Dumbbells - Palms Down - 1 x daily - 7 x weekly - 10 reps - 3 sets  Seated Single Arm Shoulder Abduction with Elbow Bent and Dumbbell - 1 x daily - 7 x weekly - 10 reps - 3 sets  Wrist AROM Flexion Extension - 1 x daily - 7 x weekly - 10 reps - 3 sets  Thumb Opposition - 1 x daily - 7 x weekly - 10 reps - 3 sets  Wrist Extension AROM - 1 x daily - 7 x weekly - 10 reps - 3 sets  Seated Elbow Flexion and Extension AROM - 1 x daily - 7 x weekly - 10 reps - 3 sets  Finger Spreading - 1 x daily - 7 x weekly - 10 reps - 3 sets    Patient Education  Understanding Energy Conservation  Energy Conservation During Daily Tasks  Check for Safety        Plan   Plan  Times per week: 4-5x/week, 1-2x/day  Times per day: Daily  Current Treatment Recommendations: Strengthening, Balance Training, Functional Mobility Training, Endurance Training, Safety Education & Training, Self-Care / ADL, Patient/Caregiver Education & Training, Equipment Evaluation, Education, & procurement    Goals  Short term goals  Time Frame for Short term goals: by discharge, pt will  Short term goal 1: demo S/MI with ADL transfers with good safety and AD/DME as needed  Short term goal 2: demo S/MI with functional mob in room distances for ADL completion with good safety/pacing  Short term goal 3: demo S/MI with toileting routine with good safety  Short term goal 4: demo I with UB ADLs and SBA with LB ADLs with good safety/pacing, DME and AE as needed  Short term goal 5: demo and verb good understanding of fall prevention techs, EC/WS techs, possible equip needs, breathing techs, and B UE HEP  Patient Goals   Patient goals : to go home       Therapy Time   Individual Concurrent Group Co-treatment   Time In 1402         Time Out 1425         Minutes 23           Upon writer exit, call light within reach, pt retired to chair. All lines intact and patient positioned comfortably. Chair alarm in place. All patient needs addressed prior to ending therapy session.  Chart reviewed prior to treatment and patient is agreeable for therapy. RN reports patient is medically stable for therapy treatment this date.     SHAWN Hill/L

## 2021-05-05 ENCOUNTER — APPOINTMENT (OUTPATIENT)
Dept: GENERAL RADIOLOGY | Age: 76
DRG: 193 | End: 2021-05-05
Payer: MEDICARE

## 2021-05-05 LAB
HCT VFR BLD CALC: 30.2 % (ref 40.7–50.3)
HEMOGLOBIN: 9.4 G/DL (ref 13–17)
MCH RBC QN AUTO: 31.4 PG (ref 25.2–33.5)
MCHC RBC AUTO-ENTMCNC: 31.1 G/DL (ref 28.4–34.8)
MCV RBC AUTO: 101 FL (ref 82.6–102.9)
NRBC AUTOMATED: 0 PER 100 WBC
PDW BLD-RTO: 12.8 % (ref 11.8–14.4)
PLATELET # BLD: 100 K/UL (ref 138–453)
PMV BLD AUTO: 8.8 FL (ref 8.1–13.5)
RBC # BLD: 2.99 M/UL (ref 4.21–5.77)
WBC # BLD: 4.5 K/UL (ref 3.5–11.3)

## 2021-05-05 PROCEDURE — 6370000000 HC RX 637 (ALT 250 FOR IP): Performed by: INTERNAL MEDICINE

## 2021-05-05 PROCEDURE — 97530 THERAPEUTIC ACTIVITIES: CPT

## 2021-05-05 PROCEDURE — 94761 N-INVAS EAR/PLS OXIMETRY MLT: CPT

## 2021-05-05 PROCEDURE — 36415 COLL VENOUS BLD VENIPUNCTURE: CPT

## 2021-05-05 PROCEDURE — 2580000003 HC RX 258: Performed by: INTERNAL MEDICINE

## 2021-05-05 PROCEDURE — 2700000000 HC OXYGEN THERAPY PER DAY

## 2021-05-05 PROCEDURE — 71045 X-RAY EXAM CHEST 1 VIEW: CPT

## 2021-05-05 PROCEDURE — 85027 COMPLETE CBC AUTOMATED: CPT

## 2021-05-05 PROCEDURE — 6360000002 HC RX W HCPCS: Performed by: INTERNAL MEDICINE

## 2021-05-05 PROCEDURE — 97110 THERAPEUTIC EXERCISES: CPT

## 2021-05-05 PROCEDURE — 99232 SBSQ HOSP IP/OBS MODERATE 35: CPT | Performed by: INTERNAL MEDICINE

## 2021-05-05 PROCEDURE — 94640 AIRWAY INHALATION TREATMENT: CPT

## 2021-05-05 PROCEDURE — 2060000000 HC ICU INTERMEDIATE R&B

## 2021-05-05 RX ORDER — BUMETANIDE 1 MG/1
1 TABLET ORAL 2 TIMES DAILY
Status: DISCONTINUED | OUTPATIENT
Start: 2021-05-05 | End: 2021-05-06 | Stop reason: HOSPADM

## 2021-05-05 RX ADMIN — IPRATROPIUM BROMIDE AND ALBUTEROL SULFATE 1 AMPULE: .5; 3 SOLUTION RESPIRATORY (INHALATION) at 07:57

## 2021-05-05 RX ADMIN — METOPROLOL SUCCINATE 50 MG: 50 TABLET, EXTENDED RELEASE ORAL at 20:31

## 2021-05-05 RX ADMIN — FERROUS SULFATE TAB EC 325 MG (65 MG FE EQUIVALENT) 325 MG: 325 (65 FE) TABLET DELAYED RESPONSE at 16:48

## 2021-05-05 RX ADMIN — MULTIVITAMIN TABLET 1 TABLET: TABLET at 08:44

## 2021-05-05 RX ADMIN — TIZANIDINE 4 MG: 4 TABLET ORAL at 20:32

## 2021-05-05 RX ADMIN — POLYVINYL ALCOHOL 1 DROP: 14 SOLUTION/ DROPS OPHTHALMIC at 21:00

## 2021-05-05 RX ADMIN — POTASSIUM CHLORIDE 20 MEQ: 1500 TABLET, EXTENDED RELEASE ORAL at 16:48

## 2021-05-05 RX ADMIN — OXYCODONE HYDROCHLORIDE AND ACETAMINOPHEN 500 MG: 500 TABLET ORAL at 08:44

## 2021-05-05 RX ADMIN — IPRATROPIUM BROMIDE AND ALBUTEROL SULFATE 1 AMPULE: .5; 3 SOLUTION RESPIRATORY (INHALATION) at 15:22

## 2021-05-05 RX ADMIN — POLYVINYL ALCOHOL 1 DROP: 14 SOLUTION/ DROPS OPHTHALMIC at 08:46

## 2021-05-05 RX ADMIN — SODIUM CHLORIDE, PRESERVATIVE FREE 10 ML: 5 INJECTION INTRAVENOUS at 20:32

## 2021-05-05 RX ADMIN — ROPINIROLE HYDROCHLORIDE 0.25 MG: 0.25 TABLET, FILM COATED ORAL at 20:31

## 2021-05-05 RX ADMIN — SODIUM CHLORIDE, PRESERVATIVE FREE 10 ML: 5 INJECTION INTRAVENOUS at 08:45

## 2021-05-05 RX ADMIN — GABAPENTIN 300 MG: 300 CAPSULE ORAL at 13:49

## 2021-05-05 RX ADMIN — FERROUS SULFATE TAB EC 325 MG (65 MG FE EQUIVALENT) 325 MG: 325 (65 FE) TABLET DELAYED RESPONSE at 08:44

## 2021-05-05 RX ADMIN — CETIRIZINE HYDROCHLORIDE 5 MG: 5 TABLET, FILM COATED ORAL at 08:44

## 2021-05-05 RX ADMIN — PANCRELIPASE 24000 UNITS: 24000; 76000; 120000 CAPSULE, DELAYED RELEASE PELLETS ORAL at 08:44

## 2021-05-05 RX ADMIN — MAGNESIUM HYDROXIDE 30 ML: 400 SUSPENSION ORAL at 20:31

## 2021-05-05 RX ADMIN — POLYVINYL ALCOHOL 1 DROP: 14 SOLUTION/ DROPS OPHTHALMIC at 12:44

## 2021-05-05 RX ADMIN — LEVOFLOXACIN 750 MG: 5 INJECTION, SOLUTION INTRAVENOUS at 16:48

## 2021-05-05 RX ADMIN — POTASSIUM CHLORIDE 20 MEQ: 1500 TABLET, EXTENDED RELEASE ORAL at 08:44

## 2021-05-05 RX ADMIN — BUDESONIDE AND FORMOTEROL FUMARATE DIHYDRATE 2 PUFF: 160; 4.5 AEROSOL RESPIRATORY (INHALATION) at 18:09

## 2021-05-05 RX ADMIN — GABAPENTIN 300 MG: 300 CAPSULE ORAL at 08:44

## 2021-05-05 RX ADMIN — BUMETANIDE 1 MG: 1 TABLET ORAL at 08:44

## 2021-05-05 RX ADMIN — POLYVINYL ALCOHOL 1 DROP: 14 SOLUTION/ DROPS OPHTHALMIC at 16:48

## 2021-05-05 RX ADMIN — TAMSULOSIN HYDROCHLORIDE 0.4 MG: 0.4 CAPSULE ORAL at 20:31

## 2021-05-05 RX ADMIN — IPRATROPIUM BROMIDE AND ALBUTEROL SULFATE 1 AMPULE: .5; 3 SOLUTION RESPIRATORY (INHALATION) at 18:08

## 2021-05-05 RX ADMIN — ENOXAPARIN SODIUM 40 MG: 40 INJECTION SUBCUTANEOUS at 08:44

## 2021-05-05 RX ADMIN — PANCRELIPASE 24000 UNITS: 24000; 76000; 120000 CAPSULE, DELAYED RELEASE PELLETS ORAL at 16:48

## 2021-05-05 RX ADMIN — ROPINIROLE HYDROCHLORIDE 0.25 MG: 0.25 TABLET, FILM COATED ORAL at 08:44

## 2021-05-05 RX ADMIN — ONDANSETRON 4 MG: 2 INJECTION INTRAMUSCULAR; INTRAVENOUS at 15:31

## 2021-05-05 RX ADMIN — BUDESONIDE AND FORMOTEROL FUMARATE DIHYDRATE 2 PUFF: 160; 4.5 AEROSOL RESPIRATORY (INHALATION) at 07:57

## 2021-05-05 RX ADMIN — GABAPENTIN 300 MG: 300 CAPSULE ORAL at 20:31

## 2021-05-05 RX ADMIN — PANCRELIPASE 24000 UNITS: 24000; 76000; 120000 CAPSULE, DELAYED RELEASE PELLETS ORAL at 12:44

## 2021-05-05 RX ADMIN — ACETAMINOPHEN 650 MG: 325 TABLET ORAL at 08:52

## 2021-05-05 RX ADMIN — VITAM B12 100 MCG: 100 TAB at 08:44

## 2021-05-05 RX ADMIN — IPRATROPIUM BROMIDE AND ALBUTEROL SULFATE 1 AMPULE: .5; 3 SOLUTION RESPIRATORY (INHALATION) at 11:56

## 2021-05-05 RX ADMIN — PANTOPRAZOLE SODIUM 40 MG: 40 TABLET, DELAYED RELEASE ORAL at 06:07

## 2021-05-05 ASSESSMENT — ENCOUNTER SYMPTOMS
VOMITING: 0
ABDOMINAL PAIN: 0
COUGH: 1
NAUSEA: 0
SHORTNESS OF BREATH: 1

## 2021-05-05 NOTE — PROGRESS NOTES
[facility]    Office: [address], DO, Amelie Ulrich, DO, Javier Yuen, DO, Campbell Alexruddy Blood, DO, Garcia Melton MD, Nelson Page MD, Martín Perez MD, Shima Couch MD, Megan Archuleta MD, Marsha Poe MD, Beba Gaxiola MD, Derrick Duarte MD, Marisela Hunter MD, Za Villarreal DO, James Izaguirre MD, Josh Martin MD, Iveth Conde DO, Adrianna Soria MD,  James Renteria DO, Constance Mcdonough MD, Monique Dean MD, Maggie Bowling, [employer], [facility], [employer], Remi Reddy, CNP, Joshua Casanova, CNS, Deandra Guzman, CNP, Zac Chase, CNP, Melvin Mason, CNP, Gabrielle Melendez, CNP, Ketan Santacruz, CNP, Kedar Benitez PA-C, Alfonzo Gan, [facility], Modesto Cervantes, CNP, Jeananne Goltz, CNP, Akiko Jc, CNP, Rika Correia, CNP, Morelia Hernandez, [facility]    Progress Note    5/5/2021    5:11 PM    Name:   Tasha Jones  MRN:     [id number]     [place]:      [de-identified]   Room:   [address] [place] [address] Day:  4  Admit Date:  5/1/2021  1:48 PM    PCP:   JULIAN Carrillo CNP  Code Status:  Full Code    Subjective:     C/C:   Chief Complaint   Patient presents with   Jocelyne Randy Fever     onset last night    Shortness of Breath    Nausea & Vomiting       Interval History Status:  Not much change  Weak  Cough  Scant yellow sputum  \"Afraid that if he goes home he is can of bounce right back to the hospital\"    Data Base Updates:  WBC4.5k/uL RBC2. 99Low m/uL Hemoglobin9.4Low     CXR  Impression:  Worsening pulmonary opacities consistent with worsening airspace disease. Specimen Source: Blood Specimen Description. BLOOD Special RequestsLT AC CultureNO GROWTH 4 DAYS     Brief History:     As documented in the medical record:  Mindy Craig is a 77 y.o. male who presents to the emergency department complaining of fever, chills, shortness of breath, and lower abdominal \"burning pain\". He has been feeling this way for the past 30 hours.   He has had no vomiting. He reports he has not been able to get out of bed because of the fever and chills. He was found at home to be hypoxic to 88%. He has never been diagnosed with Covid and has not and does not plan on getting the Covid vaccine as he does not trust it. He had [place] dual-chamber pacemaker placed on 4/9/2021 secondary to sick sinus syndrome, later he had pacemaker pocket hematoma due to which Xarelto was stopped which he was supposed to restart after visiting cardiologist in the office, it is not clear if he restarted Xarelto or not. His history of previous gastric bypass surgery, atrial fibrillation which is chronic, chronic diastolic CHF, obstructive sleep apnea with CPAP use, COPD, essential hypertension, dyslipidemia, bilateral foot drop(wears braces to walk)  His rapid Covid is negative  Chest x-ray shows patchy bilateral airspace disease  His proBNP is 5141 which is chronically high and it was higher during the previous admission    The plan included:  Continue IV Levaquin  Follow pending culture results-negative so far  DVT prophylaxis  Continue aerosol treatments, inhaled steroids and oxygen  PT OT, check orthostatics\"     Chest x-ray 5/5:  Impression:  Worsening pulmonary opacities consistent with worsening airspace disease. Medications: Allergies:     Allergies   Allergen Reactions    Darvocet [Propoxyphene N-Acetaminophen] Hives    Other Hives    Oxycodone-Acetaminophen     Propoxyphene      Other reaction(s): Unknown       Current Meds:   Scheduled Meds:    bumetanide  1 mg Oral BID    Armodafinil  1 tablet Mouth/Throat BID    budesonide-formoterol  2 puff Inhalation BID    polyvinyl alcohol  1 drop Both Eyes 4x Daily    ferrous sulfate  325 mg Oral BID WC    ascorbic acid  500 mg Oral Daily    fluticasone  2 spray Nasal Daily    potassium chloride  20 mEq Oral BID    multivitamin  1 tablet Oral Daily    pantoprazole  40 mg Oral Daily    history. He has never used smokeless tobacco. He reports that he does not drink alcohol or use drugs. Family History:   Family History   Problem Relation Age of Onset    Cancer Mother     Heart Attack Father        Review of Systems:     Review of Systems   Constitutional: Positive for activity change (Still reduced), appetite change (Decreased) and fatigue (Exercise capacity not back to baseline). Negative for chills and diaphoresis. Respiratory: Positive for cough (Yellow sputum production) and shortness of breath (Dyspnea on exertion). Cardiovascular: Positive for leg swelling. Negative for chest pain and palpitations. Gastrointestinal: Negative for abdominal pain, nausea and vomiting. Genitourinary: Negative for flank pain and hematuria. Neurological: Positive for dizziness and weakness (Generalized). Physical Examination:        Physical Exam  Vitals signs reviewed. Constitutional:       General: He is not in acute distress. Appearance: He is not diaphoretic. HENT:      Head: Normocephalic. Nose: Nose normal.   Eyes:      General: No scleral icterus. Conjunctiva/sclera: Conjunctivae normal.   Neck:      Musculoskeletal: Neck supple. Trachea: No tracheal deviation. Cardiovascular:      Rate and Rhythm: Normal rate and regular rhythm. Comments: Pacemaker site stable  Pulmonary:      Effort: Pulmonary effort is normal. No respiratory distress. Breath sounds: Rhonchi (Occasional) present. No wheezing or rales. Chest:      Chest wall: No tenderness. Abdominal:      General: Bowel sounds are normal. There is no distension. Palpations: Abdomen is soft. Tenderness: There is no abdominal tenderness. Musculoskeletal:         General: No tenderness. Skin:     General: Skin is warm and dry. Neurological:      Mental Status: He is oriented to person, place, and time.          Vitals:  BP (!) 111/55   Pulse 84   Temp 98.4 °F (36.9 °C) (Oral) Resp 16   Ht 5' 10\" (1.778 m)   Wt 205 lb 6.4 oz (93.2 kg)   SpO2 100%   BMI 29.47 kg/m²   Temp (24hrs), Av.3 °F (36.8 °C), Min:97.7 °F (36.5 °C), Max:98.6 °F (37 °C)    No results for input(s): POCGLU in the last 72 hours. I/O (24Hr): Intake/Output Summary (Last 24 hours) at 2021 1711  Last data filed at 2021 1504  Gross per 24 hour   Intake 1100 ml   Output 1800 ml   Net -700 ml       Labs:  Hematology:  Recent Labs     21  0513 21  0504   WBC 5.4 4.5   RBC 3.11* 2.99*   HGB 9.8* 9.4*   HCT 30.9* 30.2*   MCV 99.4 101.0   MCH 31.5 31.4   MCHC 31.7 31.1   RDW 13.0 12.8   * 100*   MPV 9.2 8.8     Chemistry:  Recent Labs     21  0513      K 3.7      CO2 27   GLUCOSE 112*   BUN 11   CREATININE 0.69*   ANIONGAP 7*   LABGLOM >60   GFRAA >60   CALCIUM 8.6   No results for input(s): PROT, LABALBU, LABA1C, Y8IZNKB, V7IEFKO, FT4, TSH, AST, ALT, LDH, GGT, ALKPHOS, LABGGT, BILITOT, BILIDIR, AMMONIA, AMYLASE, LIPASE, LACTATE, CHOL, HDL, LDLCHOLESTEROL, CHOLHDLRATIO, TRIG, VLDL, CSB49LO, PHENYTOIN, PHENYF, URICACID, POCGLU in the last 72 hours. ABG:  Lab Results   Component Value Date    FIO2 NOT REPORTED 2021     Lab Results   Component Value Date/Time    SPECIAL NOT REPORTED 2021 06:00 PM     Lab Results   Component Value Date/Time    CULTURE NO SAMPLE RECEIVED 2021 06:00 PM       Radiology:  Xr Chest Portable    Result Date: 2021  Right upper lung field opacity appears more prominent, otherwise no significant change. Xr Chest Portable    Result Date: 2021  Patchy bilateral airspace disease suggestive of multifocal inflammatory process or infection.          Assessment:        Principal Problem:    Pneumonia-community-acquired  Active Problems:    Chronic atrial fibrillation (HCC)    Gastroesophageal reflux disease without esophagitis    Foot drop, right    Hearing impairment    Essential hypertension    Pneumonia due to organism    COPD (chronic obstructive pulmonary disease) (HCC)    Chronic diastolic heart failure (HCC)    BPH (benign prostatic hyperplasia)    Atrial fibrillation with slow ventricular response (HCC)    Acute hypoxemic respiratory failure (HCC)    SIRS (systemic inflammatory response syndrome) ([facility] [address]  Resolved Problems:    * No resolved hospital problems. *      Plan:        Antibiotics  Respiratory therapy  Reflux precautions  Monitor and control blood pressure  PT/OT  Monitor platelet counts  Reflux precautions  Chest x-ray a.m.   Deferred discharge 24 hours  Discharge options reviewed with the patient  He may need placement    IP CONSULT  [address], DO  5/5/2021  5:11 PM

## 2021-05-05 NOTE — PROGRESS NOTES
Patient was c/o nausea and given zofran. After further investigation RN found out patient usually minces food at home d/t prevouse GI surgery causing difficulty in digesting food. Emesis was evident of undigested food. RN will initiate a change in diet to promote digestion and alleviate nausea.

## 2021-05-05 NOTE — CARE COORDINATION
Patient changed his mind. He now wants [facility]. Writer spoke with Juan Antonio Fajardo and they can accept the patient.

## 2021-05-05 NOTE — PROGRESS NOTES
Occupational Therapy  Facility/Department: [facility] PROGRESSIVE CARE  Daily Treatment Note  NAME: Sherrill Serrano  : 1945  MRN: [id number]    Date of Service: 2021    Discharge Recommendations:  Patient would benefit from continued therapy after discharge     Due to recent hospitalization and medical condition, pt would benefit from additional therapy at time of discharge to ensure safety. Please refer to the AM-PAC score for current functional status. Assessment   Performance deficits / Impairments: Decreased functional mobility ; Decreased ADL status; Decreased strength;Decreased safe awareness;Decreased cognition;Decreased endurance;Decreased posture;Decreased balance  Prognosis: Good  OT Education: OT Role;Plan of Care;Home Exercise Program;Precautions; ADL Adaptive Strategies;Transfer Training;Energy Conservation;Equipment; Family Education  REQUIRES OT FOLLOW UP: Yes  Activity Tolerance  Activity Tolerance: Patient Tolerated treatment well  Safety Devices  Safety Devices in place: Yes  Type of devices: All fall risk precautions in place;Nurse notified;Call light within reach;Gait belt;Patient at risk for falls; Chair alarm in place; Left in chair         Patient Diagnosis(es): The encounter diagnosis was Pneumonia due to organism.       has a past medical history of Acute superficial gastritis without hemorrhage, Anastomotic stricture of stomach, Asthma, Atrial fibrillation ([facility] [address] Calculus of gallbladder without cholecystitis without obstruction, Chronic diastolic heart failure (HCC), Chronic rhinosinusitis, Class 2 severe obesity due to excess calories with serious comorbidity and body mass index (BMI) of 36.0 to 36.9 in adult [facility]), COPD (chronic obstructive pulmonary disease) ([facility] [address] E. coli septicemia ([facility] [address] E. coli UTI, Foot drop, right, Fracture of femur ([facility] [address] Gait disorder, Gastric out let obstruction, GERD (gastroesophageal reflux disease), Hallux valgus, acquired, bilateral, Hyperlipidemia, Hypertension, Impaired hearing, Internal hemorrhoids, Lymphedema of both lower extremities, Nausea & vomiting, OA (osteoarthritis) of knee, bilateral, Obesity, MARIAMA on CPAP, Osteoarthritis, Osteoarthritis of lumbar spine, S/P revision of total knee, Septicemia due to E. coli (HCC), Sick sinus syndrome (HCC), Simple chronic bronchitis (HCC), Slow transit constipation, Unspecified sleep apnea, and UTI (urinary tract infection). has a past surgical history that includes Finger amputation (1966); Gastric bypass surgery (1985); Carpal tunnel release; Colonoscopy; Rotator cuff repair; joint replacement (2004 & 2005); Hemorrhoid surgery; pr egd transoral biopsy single/multiple (N/A, 05/25/2018); Upper gastrointestinal endoscopy (08/13/2018); Upper gastrointestinal endoscopy (N/A, 08/13/2018); Upper gastrointestinal endoscopy (N/A, 08/13/2018); Upper gastrointestinal endoscopy (08/16/2018); pr egd flexible foreign body removal (N/A, 08/16/2018); Upper gastrointestinal endoscopy (08/16/2018); Upper gastrointestinal endoscopy (N/A, 10/01/2018); Upper gastrointestinal endoscopy (10/01/2018); Upper gastrointestinal endoscopy (N/A, 11/19/2018); Cystoscopy (01/02/2019); Cystoscopy (N/A, 01/02/2019); Upper gastrointestinal endoscopy (N/A, 10/15/2020); Colonoscopy (N/A, 04/05/2021); and Pacemaker insertion (04/09/2021). Restrictions  Restrictions/Precautions  Restrictions/Precautions: General Precautions, Fall Risk  Required Braces or Orthoses?: Yes  Required Braces or Orthoses  Right Lower Extremity Brace: Ankle Foot Orthotics  Left Lower Extremity Brace:  Erika Foot Orthotics  Position Activity Restriction  Other position/activity restrictions: IV, bilateral AFO  Subjective   General  Chart Reviewed: Yes  Patient assessed for rehabilitation services?: Yes  Response to previous treatment: Patient with no complaints from previous session  Family / Caregiver Present: No      Orientation  Orientation  Overall Orientation Status:

## 2021-05-05 NOTE — PROGRESS NOTES
Patient was given night meds and said his Toprol XL was held previous night, and he didn't feel well. Parameters are not indicated on med admin page, but nursing judgement indicated to give considering systolic b/p is > 523 and diastolic is >38. RN will cont. To monitor and reassess.

## 2021-05-06 ENCOUNTER — APPOINTMENT (OUTPATIENT)
Dept: GENERAL RADIOLOGY | Age: 76
DRG: 193 | End: 2021-05-06
Payer: MEDICARE

## 2021-05-06 VITALS
HEART RATE: 71 BPM | BODY MASS INDEX: 29.65 KG/M2 | RESPIRATION RATE: 16 BRPM | OXYGEN SATURATION: 100 % | HEIGHT: 70 IN | SYSTOLIC BLOOD PRESSURE: 111 MMHG | WEIGHT: 207.1 LBS | TEMPERATURE: 98.2 F | DIASTOLIC BLOOD PRESSURE: 54 MMHG

## 2021-05-06 PROBLEM — Z95.0 S/P PLACEMENT OF CARDIAC PACEMAKER: Status: ACTIVE | Noted: 2021-05-06

## 2021-05-06 PROBLEM — I27.20 PULMONARY HYPERTENSION (HCC): Status: ACTIVE | Noted: 2021-05-06

## 2021-05-06 LAB
ANION GAP SERPL CALCULATED.3IONS-SCNC: 9 MMOL/L (ref 9–17)
BNP INTERPRETATION: ABNORMAL
BUN BLDV-MCNC: 14 MG/DL (ref 8–23)
BUN/CREAT BLD: 21 (ref 9–20)
CALCIUM SERPL-MCNC: 8.6 MG/DL (ref 8.6–10.4)
CHLORIDE BLD-SCNC: 101 MMOL/L (ref 98–107)
CO2: 27 MMOL/L (ref 20–31)
CREAT SERPL-MCNC: 0.66 MG/DL (ref 0.7–1.2)
GFR AFRICAN AMERICAN: >60 ML/MIN
GFR NON-AFRICAN AMERICAN: >60 ML/MIN
GFR SERPL CREATININE-BSD FRML MDRD: ABNORMAL ML/MIN/{1.73_M2}
GFR SERPL CREATININE-BSD FRML MDRD: ABNORMAL ML/MIN/{1.73_M2}
GLUCOSE BLD-MCNC: 100 MG/DL (ref 70–99)
HCT VFR BLD CALC: 29.8 % (ref 40.7–50.3)
HEMOGLOBIN: 9.5 G/DL (ref 13–17)
MCH RBC QN AUTO: 32 PG (ref 25.2–33.5)
MCHC RBC AUTO-ENTMCNC: 31.9 G/DL (ref 28.4–34.8)
MCV RBC AUTO: 100.3 FL (ref 82.6–102.9)
NRBC AUTOMATED: 0 PER 100 WBC
PDW BLD-RTO: 12.7 % (ref 11.8–14.4)
PLATELET # BLD: 118 K/UL (ref 138–453)
PMV BLD AUTO: 9 FL (ref 8.1–13.5)
POTASSIUM SERPL-SCNC: 4.5 MMOL/L (ref 3.7–5.3)
PRO-BNP: 3530 PG/ML
RBC # BLD: 2.97 M/UL (ref 4.21–5.77)
SODIUM BLD-SCNC: 137 MMOL/L (ref 135–144)
WBC # BLD: 4.5 K/UL (ref 3.5–11.3)

## 2021-05-06 PROCEDURE — 6370000000 HC RX 637 (ALT 250 FOR IP): Performed by: INTERNAL MEDICINE

## 2021-05-06 PROCEDURE — 97110 THERAPEUTIC EXERCISES: CPT

## 2021-05-06 PROCEDURE — 97116 GAIT TRAINING THERAPY: CPT

## 2021-05-06 PROCEDURE — 71045 X-RAY EXAM CHEST 1 VIEW: CPT

## 2021-05-06 PROCEDURE — 83880 ASSAY OF NATRIURETIC PEPTIDE: CPT

## 2021-05-06 PROCEDURE — 94640 AIRWAY INHALATION TREATMENT: CPT

## 2021-05-06 PROCEDURE — 6360000002 HC RX W HCPCS: Performed by: INTERNAL MEDICINE

## 2021-05-06 PROCEDURE — 80048 BASIC METABOLIC PNL TOTAL CA: CPT

## 2021-05-06 PROCEDURE — 36415 COLL VENOUS BLD VENIPUNCTURE: CPT

## 2021-05-06 PROCEDURE — 2580000003 HC RX 258: Performed by: INTERNAL MEDICINE

## 2021-05-06 PROCEDURE — 97530 THERAPEUTIC ACTIVITIES: CPT

## 2021-05-06 PROCEDURE — 99239 HOSP IP/OBS DSCHRG MGMT >30: CPT | Performed by: INTERNAL MEDICINE

## 2021-05-06 PROCEDURE — 85027 COMPLETE CBC AUTOMATED: CPT

## 2021-05-06 PROCEDURE — 2700000000 HC OXYGEN THERAPY PER DAY

## 2021-05-06 RX ORDER — LEVOFLOXACIN 250 MG/1
500 TABLET ORAL DAILY
Qty: 7 TABLET | Refills: 0 | Status: SHIPPED | OUTPATIENT
Start: 2021-05-06 | End: 2021-05-13

## 2021-05-06 RX ADMIN — IPRATROPIUM BROMIDE AND ALBUTEROL SULFATE 1 AMPULE: .5; 3 SOLUTION RESPIRATORY (INHALATION) at 11:35

## 2021-05-06 RX ADMIN — SODIUM CHLORIDE, PRESERVATIVE FREE 10 ML: 5 INJECTION INTRAVENOUS at 08:36

## 2021-05-06 RX ADMIN — MULTIVITAMIN TABLET 1 TABLET: TABLET at 08:35

## 2021-05-06 RX ADMIN — BUDESONIDE AND FORMOTEROL FUMARATE DIHYDRATE 2 PUFF: 160; 4.5 AEROSOL RESPIRATORY (INHALATION) at 06:06

## 2021-05-06 RX ADMIN — FERROUS SULFATE TAB EC 325 MG (65 MG FE EQUIVALENT) 325 MG: 325 (65 FE) TABLET DELAYED RESPONSE at 08:35

## 2021-05-06 RX ADMIN — OXYCODONE HYDROCHLORIDE AND ACETAMINOPHEN 500 MG: 500 TABLET ORAL at 08:35

## 2021-05-06 RX ADMIN — GABAPENTIN 300 MG: 300 CAPSULE ORAL at 08:35

## 2021-05-06 RX ADMIN — PANTOPRAZOLE SODIUM 40 MG: 40 TABLET, DELAYED RELEASE ORAL at 06:19

## 2021-05-06 RX ADMIN — ENOXAPARIN SODIUM 40 MG: 40 INJECTION SUBCUTANEOUS at 08:34

## 2021-05-06 RX ADMIN — BUMETANIDE 1 MG: 1 TABLET ORAL at 08:35

## 2021-05-06 RX ADMIN — PANCRELIPASE 24000 UNITS: 24000; 76000; 120000 CAPSULE, DELAYED RELEASE PELLETS ORAL at 08:35

## 2021-05-06 RX ADMIN — VITAM B12 100 MCG: 100 TAB at 08:35

## 2021-05-06 RX ADMIN — ROPINIROLE HYDROCHLORIDE 0.25 MG: 0.25 TABLET, FILM COATED ORAL at 08:35

## 2021-05-06 RX ADMIN — IPRATROPIUM BROMIDE AND ALBUTEROL SULFATE 1 AMPULE: .5; 3 SOLUTION RESPIRATORY (INHALATION) at 06:06

## 2021-05-06 RX ADMIN — CETIRIZINE HYDROCHLORIDE 5 MG: 5 TABLET, FILM COATED ORAL at 08:35

## 2021-05-06 RX ADMIN — POTASSIUM CHLORIDE 20 MEQ: 1500 TABLET, EXTENDED RELEASE ORAL at 08:35

## 2021-05-06 RX ADMIN — POLYVINYL ALCOHOL 1 DROP: 14 SOLUTION/ DROPS OPHTHALMIC at 08:36

## 2021-05-06 ASSESSMENT — PAIN SCALES - GENERAL: PAINLEVEL_OUTOF10: 0

## 2021-05-06 NOTE — CARE COORDINATION
DC Planning    Spoke with pt and his spouse at bedside, agreeable to home care [facility]. He still on 02 1.5l and c/o some dyspnea-relays he may have \"needed 02 before but never looked onto it. \"     Perfect serve to attending for home 02 eval.     Notified [place] of dc today. Home care agency will pull data electronically. ANDRESSA is signed.

## 2021-05-06 NOTE — PROGRESS NOTES
Pt given discharge instructions and all questions answered. Pt has all belongings and in no distress.  Patient left with wife

## 2021-05-06 NOTE — PLAN OF CARE
Problem: Falls - Risk of:  Goal: Will remain free from falls  Description: Will remain free from falls  Outcome: Ongoing     Problem: Skin Integrity:  Goal: Will show no infection signs and symptoms  Description: Will show no infection signs and symptoms  Outcome: Ongoing     Problem: Breathing Pattern - Ineffective:  Goal: Ability to achieve and maintain a regular respiratory rate will improve  Description: Ability to achieve and maintain a regular respiratory rate will improve  Outcome: Ongoing

## 2021-05-06 NOTE — PROGRESS NOTES
Physical Therapy  Facility/Department: [facility] PROGRESSIVE CARE  Daily Treatment Note  NAME: Maile Linares  : 1945  MRN: [id number]    Date of Service: 2021    Discharge Recommendations:  Patient would benefit from continued therapy after discharge   PT Equipment Recommendations  Equipment Needed: No    Assessment   Body structures, Functions, Activity limitations: Decreased functional mobility ; Decreased strength;Decreased endurance;Decreased sensation;Decreased balance;Decreased posture  Prognosis: Good  PT Education: Goals;PT Role;Plan of Care;Disease Specific Education;General Safety  Patient Education: Patient educated to deep breathing activities and benefits of being OOB. Patient demo good understanding. REQUIRES PT FOLLOW UP: Yes  Activity Tolerance  Activity Tolerance: Patient limited by endurance; Patient Tolerated treatment well     Patient Diagnosis(es): The primary encounter diagnosis was Pneumonia due to organism. A diagnosis of Pneumonia of both lungs due to infectious organism, unspecified part of lung was also pertinent to this visit.      has a past medical history of Acute superficial gastritis without hemorrhage, Anastomotic stricture of stomach, Asthma, Atrial fibrillation ([facility] [address] Calculus of gallbladder without cholecystitis without obstruction, Chronic diastolic heart failure (HCC), Chronic rhinosinusitis, Class 2 severe obesity due to excess calories with serious comorbidity and body mass index (BMI) of 36.0 to 36.9 in adult [facility]), COPD (chronic obstructive pulmonary disease) ([facility] [address] E. coli septicemia ([facility] [address] E. coli UTI, Foot drop, right, Fracture of femur ([facility] [address] Gait disorder, Gastric out let obstruction, GERD (gastroesophageal reflux disease), Hallux valgus, acquired, bilateral, Hyperlipidemia, Hypertension, Impaired hearing, Internal hemorrhoids, Lymphedema of both lower extremities, Nausea & vomiting, OA (osteoarthritis) of knee, bilateral, Obesity, MARIAMA on CPAP, Osteoarthritis, Osteoarthritis of lumbar spine, S/P revision of total knee, Septicemia due to E. coli (HCC), Sick sinus syndrome (HCC), Simple chronic bronchitis (HCC), Slow transit constipation, Unspecified sleep apnea, and UTI (urinary tract infection). has a past surgical history that includes Finger amputation (1966); Gastric bypass surgery (1985); Carpal tunnel release; Colonoscopy; Rotator cuff repair; joint replacement (2004 & 2005); Hemorrhoid surgery; pr egd transoral biopsy single/multiple (N/A, 05/25/2018); Upper gastrointestinal endoscopy (08/13/2018); Upper gastrointestinal endoscopy (N/A, 08/13/2018); Upper gastrointestinal endoscopy (N/A, 08/13/2018); Upper gastrointestinal endoscopy (08/16/2018); pr egd flexible foreign body removal (N/A, 08/16/2018); Upper gastrointestinal endoscopy (08/16/2018); Upper gastrointestinal endoscopy (N/A, 10/01/2018); Upper gastrointestinal endoscopy (10/01/2018); Upper gastrointestinal endoscopy (N/A, 11/19/2018); Cystoscopy (01/02/2019); Cystoscopy (N/A, 01/02/2019); Upper gastrointestinal endoscopy (N/A, 10/15/2020); Colonoscopy (N/A, 04/05/2021); and Pacemaker insertion (04/09/2021). Restrictions  Restrictions/Precautions  Restrictions/Precautions: General Precautions, Fall Risk  Required Braces or Orthoses?: No  Required Braces or Orthoses  Right Lower Extremity Brace: Ankle Foot Orthotics  Left Lower Extremity Brace: Erika Foot Orthotics  Position Activity Restriction  Other position/activity restrictions: IV, bilateral AFO  Subjective   General  Chart Reviewed: Yes  Additional Pertinent Hx: COPD, LE lymphedema, Foot drop with AFO from back issues, SSS, CERD, a-fib, gastric bypass  Response To Previous Treatment: Patient with no complaints from previous session.   Family / Caregiver Present: No  Subjective  Subjective: Patient agreeable for PT treatment and requesting HEP  General Comment  Comments: RACHEL Fairchild reports patient medically appropriate for PT. Orientation  Orientation  Overall Orientation Status: Within Normal Limits  Cognition   Cognition  Overall Cognitive Status: WNL  Objective   Bed mobility  Rolling to Left: Supervision  Rolling to Right: Supervision  Supine to Sit: Supervision  Sit to Supine: Supervision  Scooting: Supervision  Comment: HOB raised and use of bed rails, patient reports he has a hospital bed at home. VCs for tech and safety. Transfers  Sit to Stand: Contact guard assistance  Stand to sit: Contact guard assistance  Bed to Chair: Contact guard assistance  Stand Pivot Transfers: Contact guard assistance  Lateral Transfers: Contact guard assistance  Comment: RW, becka good safety including hand placement with transfers and backing all the way up to the chair. Ambulation  Ambulation?: Yes  More Ambulation?: No  Ambulation 1  Surface: level tile  Device: Rolling Walker  Assistance: Minimal assistance  Quality of Gait: Flexed posture, very wide JACOB, SOB with ambulation, Bilateral AFOs on  Gait Deviations: Slow Zoie; Increased JACOB; Decreased step length;Decreased step height;Decreased head and trunk rotation  Distance: 80' x 2  Comments: Seated rest break x 5 mins due to fatigue. Stairs/Curb  Stairs?: No  Neuromuscular Education  NDT Treatment: Gait ;Sitting;Standing  Neuromuscular Comments: .neuro  Balance  Posture: Fair  Sitting - Static: Good  Sitting - Dynamic: Good  Standing - Static: Fair  Standing - Dynamic: Fair;-   Patient demo'd each set of exercises (seated and supine) x 10 reps with good understanding and wife present verbalizing understanding. Exercises  Comments: Access Code: [email or web address]: [employer]/Prepared by: Brenna Soda  Exercises    Seated Scapular Retraction - 1 x daily - 7 x weekly - 10 reps - 3 sets    Seated Ankle Pumps - 1 x daily - 7 x weekly - 10 reps - 3 sets    Seated Gluteal Sets - 1 x daily - 7 x weekly - 10 reps - 3 sets    Seated Long Arc Quad - 1 x daily - 7 x weekly - 10 reps - 3 sets  Seated March - 1 x daily - 7 x weekly - 10 reps - 3 sets    Seated Hip Abduction - 1 x daily - 7 x weekly - 10 reps - 3 sets    Seated Pursed Lip Breathing - 1 x daily - 7 x weekly - 10 reps - 3 sets  Access Code: [email or web address]: [email or web address]/  Prepared by: Dino Jennings  Exercises    Supine Ankle Pumps - 1 x daily - 7 x weekly - 10 reps - 3 sets    Supine Quad Set - 1 x daily - 7 x weekly - 10 reps - 3 sets    Supine Heel Slide - 1 x daily - 7 x weekly - 10 reps - 3 sets    Supine Active Straight Leg Raise - 1 x daily - 7 x weekly - 10 reps - 3 sets    Supine Hip Abduction AROM - 1 x daily - 7 x weekly - 10 reps - 3 sets    Supine Bridge - 1 x daily - 7 x weekly - 10 reps - 3 sets    Supine Gluteal Sets - 1 x daily - 7 x weekly - 10 reps - 3 sets    OutComes Score  Balance Score: 10 (05/06/21 1454)  Gait Score: 8 (05/06/21 1454)        Tinetti Total Score: 18 (05/06/21 1454)    AM-PAC Score  AM-PAC Inpatient Mobility Raw Score : 17 (05/06/21 1452)  AM-PAC Inpatient T-Scale Score : 42.13 (05/06/21 1452)  Mobility Inpatient CMS 0-100% Score: 50.57 (05/06/21 1452)  Mobility Inpatient CMS G-Code Modifier : CK (05/06/21 1452)          Goals  Short term goals  Time Frame for Short term goals: 12 visits  Short term goal 1: Patient will be indep with bed mobility and transfers. Short term goal 2: Patient will amb 200 feet with RW and AFOs and supervision. Short term goal 3: Patient will negotiate 5 stairs with rail and supervision. Short term goal 4: Patient will be indep with HEP. Short term goal 5: Patient will tolerate 30 minutes of ther-ex and ther-act.   Patient Goals   Patient goals : Return home, OP PT    Plan    Plan  Times per week: 1-2x/d, 5-6 d/wk  Current Treatment Recommendations: Strengthening, Balance Training, Functional Mobility Training, Transfer Training, Stair training, Gait Training, Endurance Training, Patient/Caregiver Education & Training, Home Exercise Program, Safety Education & Training  Safety Devices  Type of devices:  All fall risk precautions in place, Gait belt, Nurse notified, Call light within reach, Left in chair  Restraints  Initially in place: No     Therapy Time   Individual Concurrent Group Co-treatment   Time In 1311         Time Out 1405         Minutes 002 [place], [place]

## 2021-05-07 NOTE — PROGRESS NOTES
Expand AllCollapse All      [facility]     Office: [phone]     Jeanie Sandoval, DO, Rossana Fulton DO, Gloria Christine DO, Maria L Bermudez Blood, DO, Isaiah Ruiz MD, Tiffani Lawson MD, Mercy Black MD, Markus Phan MD, Gita Corral MD, Montse Phoenix MD, Cindy Moreno MD, Jolanta Lazcano MD, Marisela Sanchez MD, Henrik Butler DO, Eugenio Brandt MD, Dori Schaefer MD, Teresa English DO, Liz Patel MD,  Liseth Newsome DO, Keli Joaquin MD, Lisa Roldan MD, Dandy Orellana [facility], [facility], CNP, Levi Amas, CNP, Noemí Pulse, CNS, Amparo Haled, CNP, Shelly Hahn, CNP, Darien Arceo, CNP, Pilar Pulse, CNP, Patria Castillo, CNP, BOZENA Winn-ERASTO, [place] , DNP, Panfilo Emmanuel, CNP, Pratik Vasquez, CNP, Frank Aguirre, CNP, Vel Waddell, CNP, Judith Johns, CNP     [address]      Progress Note     5/6/2021          11:21 AM     Name:             Kelly Carrillo  MRN:               [id number]                                    [place]:                [de-identified]   Room:             [address] [place] [address] Day:            5  Admit Date:     5/1/2021  1:48 PM                    PCP:                JULIAN Chavez CNP  Code Status:   Full Code     Subjective:      C/C:        Chief Complaint   Patient presents with    Fever       onset last night    Shortness of Breath    Nausea & Vomiting         Interval History Status:  Improved  Exercise capacity increased  No orthopnea  Occasional cough, nonproductive  O2 sats satisfactory     Data Base Updates:  Has been in negative fluid balance     WBC4.5k/uL RBC2. 97Low m/uL Hemoglobin9.5Low      Pro-BNP3,530High      Chest x-ray still lagging behind clinical findings:  Impression:  Increasing airspace opacities when compared to the prior study         Brief History:      As documented in the medical record:  \"Levi Anguiano is a 77 y. o. male who presents to the emergency department complaining of fever, chills, shortness of breath, and lower abdominal \"burning pain\".  He has been feeling this way for the past 30 hours. Cyn Ibrahim has had no vomiting.  He reports he has not been able to get out of bed because of the fever and chills.  He was found at home to be hypoxic to 88%. Cyn Ibrahim has never been diagnosed with Covid and has not and does not plan on getting the Covid vaccine as he does not trust it.     He had [facility] dual-chamber pacemaker placed on 4/9/2021 secondary to sick sinus syndrome, later he had pacemaker pocket hematoma due to which Xarelto was stopped which he was supposed to restart after visiting cardiologist in the office, it is not clear if he restarted Xarelto or not.     His history of previous gastric bypass surgery, atrial fibrillation which is chronic, chronic diastolic CHF, obstructive sleep apnea with CPAP use, COPD, essential hypertension, dyslipidemia, bilateral foot drop(wears braces to walk)  His rapid Covid is negative  Chest x-ray shows patchy bilateral airspace disease  His proBNP is 5141 which is chronically high and it was higher during the previous admission     The plan included:  Continue IV Levaquin  Follow pending culture results-negative so far  DVT prophylaxis  Continue aerosol treatments, inhaled steroids and oxygen  PT OT, check orthostatics\"      Echo 6/2020:    Left Ventricle: Systolic function is normal with an ejection fraction   of 65-70%.   Tricuspid Valve: The right ventricular systolic pressure is mild to   moderately elevated. RVSP estimated at 30-35 mmHg.   Tricuspid Valve: There is mild to moderate pulmonary hypertension.     Chest x-ray 5/5:  Impression:  Worsening pulmonary opacities consistent with worsening airspace disease.      The patient's condition was stabilized  Discharge planning initiated     Medications:      Allergies:           Allergies   Allergen Reactions    Darvocet [Propoxyphene N-Acetaminophen] Hives    Other Hives    disease), Hallux valgus, acquired, bilateral, Hyperlipidemia, Hypertension, Impaired hearing, Internal hemorrhoids, Lymphedema of both lower extremities, Nausea & vomiting, OA (osteoarthritis) of knee, bilateral, Obesity, MARIAMA on CPAP, Osteoarthritis, Osteoarthritis of lumbar spine, S/P revision of total knee, Septicemia due to E. coli (HCC), Sick sinus syndrome (HCC), Simple chronic bronchitis (HCC), Slow transit constipation, Unspecified sleep apnea, and UTI (urinary tract infection).     Social History:   reports that he quit smoking about 44 years ago. He has a 20.00 pack-year smoking history. He has never used smokeless tobacco. He reports that he does not drink alcohol or use drugs.      Family History:   Family History         Family History   Problem Relation Age of Onset    Cancer Mother      Heart Attack Father              Review of Systems:      Review of Systems   Constitutional: Positive for activity change (Increased), appetite change (Eating better) and fatigue (Improved). Negative for chills and diaphoresis. Respiratory: Positive for cough (Yellow sputum production) and shortness of breath (Dyspnea on exertion diminished). Cardiovascular: Positive for leg swelling. Negative for chest pain and palpitations. Gastrointestinal: Negative for abdominal pain, nausea and vomiting. Genitourinary: Negative for flank pain and hematuria. Neurological: Positive for weakness (Improved). Negative for dizziness.         Physical Examination:         Physical Exam  Vitals signs reviewed. Constitutional:       General: He is not in acute distress. Appearance: He is not diaphoretic. HENT:      Head: Normocephalic. Nose: Nose normal.   Eyes:      General: No scleral icterus. Conjunctiva/sclera: Conjunctivae normal.   Neck:      Musculoskeletal: Neck supple. Trachea: No tracheal deviation. Cardiovascular:      Rate and Rhythm: Normal rate and regular rhythm.       Comments: Pacemaker site stable  Pulmonary:      Effort: Pulmonary effort is normal. No respiratory distress. Breath sounds: Rhonchi (Occasional) present. No wheezing or rales. Chest:      Chest wall: No tenderness. Abdominal:      General: Bowel sounds are normal. There is no distension. Palpations: Abdomen is soft. Tenderness: There is no abdominal tenderness. Musculoskeletal:         General: No tenderness. Right lower leg: Edema present. Left lower leg: Edema present. Comments: 1/4 edema at the ankles   Skin:     General: Skin is warm and dry.    Neurological:      Mental Status: He is alert and oriented to person, place, and time.          Vitals:  BP (!) 97/53   Pulse 72   Temp 97.9 °F (36.6 °C) (Oral)   Resp 20   Ht 5' 10\" (1.778 m)   Wt 207 lb 1.6 oz (93.9 kg)   SpO2 99%   BMI 29.72 kg/m²   Temp (24hrs), Av.1 °F (36.7 °C), Min:97.7 °F (36.5 °C), Max:98.4 °F (36.9 °C)     No results for input(s): POCGLU in the last 72 hours.     I/O (24Hr):     Intake/Output Summary (Last 24 hours) at 2021 1121  Last data filed at 2021 1119      Gross per 24 hour   Intake --   Output 1900 ml   Net -1900 ml         Labs:  Hematology:        Recent Labs     21  0513 21  0504 21  0459   WBC 5.4 4.5 4.5   RBC 3.11* 2.99* 2.97*   HGB 9.8* 9.4* 9.5*   HCT 30.9* 30.2* 29.8*   MCV 99.4 101.0 100.3   MCH 31.5 31.4 32.0   MCHC 31.7 31.1 31.9   RDW 13.0 12.8 12.7   * 100* 118*   MPV 9.2 8.8 9.0      Chemistry:       Recent Labs     21  0513 21  0459    137   K 3.7 4.5    101   CO2 27 27   GLUCOSE 112* 100*   BUN 11 14   CREATININE 0.69* 0.66*   ANIONGAP 7* 9   LABGLOM >60 >60   GFRAA >60 >60   CALCIUM 8.6 8.6   PROBNP  --  3,530*   No results for input(s): PROT, LABALBU, LABA1C, S6OJWFF, U3BVBBG, FT4, TSH, AST, ALT, LDH, GGT, ALKPHOS, LABGGT, BILITOT, BILIDIR, AMMONIA, AMYLASE, LIPASE, LACTATE, CHOL, HDL, LDLCHOLESTEROL, CHOLHDLRATIO, TRIG, VLDL, [id number],

## 2021-05-07 NOTE — DISCHARGE SUMMARY
leading to esophageal stricture and aspiration [Z98.84] 10/11/2020    Chronic diastolic heart failure (HCC) [I50.32]     [facility]ch[facility] [J44.9]     MARIAMA on CPAP [G47.33, Z99.89]     Essential hypertension [I10]     Hearing impairment [H91.90] 06/24/2015    Foot drop, right [M21.371] 07/10/2012    Chronic atrial fibrillation (HCC) [I48.20]     Gastroesophageal reflux disease without esophagitis [K21.9]          Hospital Course:     Brief History:  As documented in the medical record:  \"Levi Anguiano is a 77 y. o. male who presents to the emergency department complaining of fever, chills, shortness of breath, and lower abdominal \"burning pain\".  He has been feeling this way for the past 30 hours. Marlin Edward has had no vomiting.  He reports he has not been able to get out of bed because of the fever and chills.  He was found at home to be hypoxic to 88%. Marlin Edward has never been diagnosed with Covid and has not and does not plan on getting the Covid vaccine as he does not trust it.     He had [facility] dual-chamber pacemaker placed on 4/9/2021 secondary to sick sinus syndrome, later he had pacemaker pocket hematoma due to which Xarelto was stopped which he was supposed to restart after visiting cardiologist in the office, it is not clear if he restarted Xarelto or not.     His history of previous gastric bypass surgery, atrial fibrillation which is chronic, chronic diastolic CHF, obstructive sleep apnea with CPAP use, COPD, essential hypertension, dyslipidemia, bilateral foot drop(wears braces to walk)  His rapid Covid is negative  Chest x-ray shows patchy bilateral airspace disease  His proBNP is 5141 which is chronically high and it was higher during the previous admission     The plan included:  Continue IV Levaquin  Follow pending culture results-negative so far  DVT prophylaxis  Continue aerosol treatments, inhaled steroids and oxygen  PT OT, check orthostatics\"      Echo 6/2020:    Left Ventricle: Systolic function is normal with an ejection fraction   of 65-70%.   Tricuspid Valve: The right ventricular systolic pressure is mild to   moderately elevated. RVSP estimated at 30-35 mmHg.   Tricuspid Valve: There is mild to moderate pulmonary hypertension.     Chest x-ray 5/5:  Impression:  Worsening pulmonary opacities consistent with worsening airspace disease.      The patient's condition was stabilized  Discharge planning initiated    Discharge plan:     Optimize cardio-pulmonary function   Antibiotics  Respiratory therapy  Bumex  Toprol   Reflux precautions  Monitor and control blood pressure  PT/OT  Monitor platelet counts  Reflux precautions  The patient feels he is ready to go home  He is declined placement  DC planning  Will discharge when arrangements complete and ok with other services. Follow-up with PCP in one week, JULIAN De Jesus - CNP  Notify PCP of [address] [address]  Cardiology follow-up Dr Fransico Root  Med rec done  ANDRESSA done  DCP 34 min+       Discharge Procedure Orders   XR CHEST (SINGLE VIEW FRONTAL)   Standing Status: Future Standing Exp.  Date: 06/06/21       Significant Diagnostic Studies:     Labs / Micro:  Labs:  Hematology:  Recent Labs     05/04/21  0513 05/05/21  0504 05/06/21  0459   WBC 5.4 4.5 4.5   RBC 3.11* 2.99* 2.97*   HGB 9.8* 9.4* 9.5*   HCT 30.9* 30.2* 29.8*   MCV 99.4 101.0 100.3   MCH 31.5 31.4 32.0   MCHC 31.7 31.1 31.9   RDW 13.0 12.8 12.7   * 100* 118*   MPV 9.2 8.8 9.0     Chemistry:  Recent Labs     05/04/21  0513 05/06/21  0459    137   K 3.7 4.5    101   CO2 27 27   GLUCOSE 112* 100*   BUN 11 14   CREATININE 0.69* 0.66*   ANIONGAP 7* 9   LABGLOM >60 >60   GFRAA >60 >60   CALCIUM 8.6 8.6   PROBNP  --  3,530*   No results for input(s): PROT, LABALBU, LABA1C, X8GDFHG, T9EXFLW, FT4, TSH, AST, ALT, LDH, GGT, ALKPHOS, LABGGT, BILITOT, BILIDIR, AMMONIA, AMYLASE, LIPASE, LACTATE, CHOL, HDL, LDLCHOLESTEROL, CHOLHDLRATIO, TRIG, VLDL, hydrocephalus. Periventricular and deep subcortical white matter hypoattenuation, consistent microangiopathic change. Mild parenchymal volume loss. Atherosclerosis of the intracranial vasculature. Bilateral basal ganglia mineralization. ORBITS: The visualized portion of the orbits demonstrate no acute abnormality. SINUSES: The visualized paranasal sinuses and mastoid air cells demonstrate no acute abnormality. SOFT TISSUES/SKULL:  No acute abnormality of the visualized skull or soft tissues. No acute intracranial abnormality. Microangiopathic change. Ct Chest W Contrast    Result Date: 4/12/2021  EXAMINATION: CT OF THE CHEST WITH CONTRAST 4/12/2021 1:09 am TECHNIQUE: CT of the chest was performed with the administration of intravenous contrast. Multiplanar reformatted images are provided for review. Dose modulation, iterative reconstruction, and/or weight based adjustment of the mA/kV was utilized to reduce the radiation dose to as low as reasonably achievable. COMPARISON: 02/05/2021 HISTORY: ORDERING SYSTEM PROVIDED HISTORY: bruising left upper chest s/p pacemaker implant, abscess? TECHNOLOGIST PROVIDED HISTORY: bruising left upper chest s/p pacemaker implant, abscess? Decision Support Exception->Emergency Medical Condition (MA) Reason for Exam: Pt has a pacemaker that was put in 3 days ago and it's red, swollen, bruised and painful. Acuity: Acute Type of Exam: Initial Relevant Medical/Surgical History: Hx of HTN, COPD, asthma FINDINGS: Mediastinum: Thoracic aorta has normal contour and caliber. No mediastinal hematoma. Heart size is enlarged. Coronary artery disease. No significant pericardial effusion. No enlarged mediastinal or hilar lymph nodes. Lungs/pleura: The central airways are patent. Parenchymal scar in the posterior right upper lobe. Right more than left basilar atelectasis. No focal consolidation. No pleural effusion or pneumothorax. Upper Abdomen: Cholelithiasis.   Postsurgical prior study. Findings suggest worsening airspace disease. No extrapleural air or gross effusions. Worsening pulmonary opacities consistent with worsening airspace disease. Xr Chest Portable    Result Date: 5/2/2021  EXAMINATION: ONE XRAY VIEW OF THE CHEST 5/2/2021 9:45 am COMPARISON: 05/01/2021, 04/12/2021 HISTORY: ORDERING SYSTEM PROVIDED HISTORY: Pneumonia follow-up TECHNOLOGIST PROVIDED HISTORY: Pneumonia follow-up Acuity: Acute Type of Exam: Unknown FINDINGS: Ill-defined opacity in the right upper lung field appears more prominent in the interval.  The subtle ground-glass opacities bilaterally otherwise appear unchanged. No evidence for pneumothorax, edema or effusion. Cardiac silhouette enlargement again noted. Left subclavian dual chamber pacer in place. Right upper lung field opacity appears more prominent, otherwise no significant change. Xr Chest Portable    Result Date: 5/1/2021  EXAMINATION: ONE XRAY VIEW OF THE CHEST 5/1/2021 2:19 pm COMPARISON: 04/12/2021 HISTORY: ORDERING SYSTEM PROVIDED HISTORY: hypoxia TECHNOLOGIST PROVIDED HISTORY: hypoxia Acuity: Acute Type of Exam: Unknown FINDINGS: The cardiac and mediastinal contours appear unchanged with cardiac silhouette enlargement and left subclavian dual chamber pacer in place. Patchy bilateral airspace disease is noted. No pneumothorax, evidence for edema or effusion. No acute osseous abnormality identified. Patchy bilateral airspace disease suggestive of multifocal inflammatory process or infection. Xr Chest Portable    Result Date: 4/12/2021  EXAMINATION: ONE XRAY VIEW OF THE CHEST 4/12/2021 12:18 am COMPARISON: Chest x-ray 04/10/2021. HISTORY: ORDERING SYSTEM PROVIDED HISTORY: pain TECHNOLOGIST PROVIDED HISTORY: pain Reason for Exam: swelling over pacemaker site FINDINGS: Cardiomediastinal silhouette is unremarkable for projection. Lungs demonstrate no focal consolidation or pleural effusion.  No pneumothorax appreciated on this projection. Skin fold projecting over the right lung. Transvenous pacer. No acute process by radiograph. Xr Chest Portable    Result Date: 4/9/2021  EXAMINATION: ONE XRAY VIEW OF THE CHEST 4/9/2021 8:55 am COMPARISON: April 7, 2021 HISTORY: ORDERING SYSTEM PROVIDED HISTORY: Pacemaker placement and rule out pneumothorax TECHNOLOGIST PROVIDED HISTORY: Pacemaker placement and rule out pneumothorax Reason for Exam: Port upright AP CXR - pacemaker placement and rule out pneumothorax Acuity: Unknown Type of Exam: Unknown FINDINGS: There is no evidence of pneumothorax bilaterally. No effusion or infiltrate is seen. Heart silhouette remains enlarged. There is a new left-sided bipolar pacemaker in place with leads intact. Visualized bony thorax shows no acute abnormality. No evidence of pneumothorax post pacemaker placement. Xr Chest Portable    Result Date: 4/7/2021  EXAMINATION: ONE XRAY VIEW OF THE CHEST 4/7/2021 2:59 pm COMPARISON: 02/26/2021 HISTORY: ORDERING SYSTEM PROVIDED HISTORY: Chest Pain TECHNOLOGIST PROVIDED HISTORY: Chest Pain Reason for Exam: chest pain Mechanism of Injury: hypotension, dizziness, loss of consciousness FINDINGS: Chronic elevation of the right hemidiaphragm. The cardiac size is enlarged. No acute infiltrates or pleural effusions are seen. Pulmonary vascularity appears normal. There is mild ectasia of the thoracic aorta. There are degenerative changes in the spine and shoulders with postsurgical changes in the left humeral head. .No acute bony abnormalities.  The hilar structures are normal.     No acute cardiopulmonary disease Stable cardiomegaly         Consultations:    Consults:     Final Specialist Recommendations/Findings:   IP CONSULT TO INTERNAL MEDICINE        Discharged Condition:    Stable     Disposition: Home Care      Physician Follow Up:   [address] [place] [address]  [phone]    they will provide home care     [facility] at [address]  [phone]           Activity:  activity as tolerated    Diet:  cardiac diet     Discharge Medications:      Medication List      START taking these medications    levoFLOXacin 250 MG tablet  Commonly known as: Levaquin  Take 2 tablets by mouth daily for 7 days        CONTINUE taking these medications    Armodafinil 200 MG Tabs     ascorbic acid 500 MG tablet  Commonly known as: VITAMIN C  Take 1 tablet by mouth daily     benzonatate 100 MG capsule  Commonly known as: TESSALON  TAKE 1 CAPSULE BY MOUTH THREE TIMES DAILY AS NEEDED FOR COUGH     Breo Ellipta 200-25 MCG/INH Aepb inhaler  Generic drug: Fluticasone furoate-vilanterol     bumetanide 1 MG tablet  Commonly known as: BUMEX     carboxymethylcellulose 0.5 % Soln ophthalmic solution  Commonly known as: REFRESH PLUS     Creon 25447-11777 units delayed release capsule  Generic drug: lipase-protease-amylase  TAKE 1 CAPSULE BY MOUTH THREE TIMES DAILY WITH MEALS     ferrous sulfate 325 (65 Fe) MG EC tablet  Commonly known as: FE TABS 325  Take 1 tablet by mouth 2 times daily (with meals)     fluticasone 50 MCG/ACT nasal spray  Commonly known as: FLONASE  2 sprays by Nasal route daily     gabapentin 300 MG capsule  Commonly known as: NEURONTIN  TAKE 1 CAPSULE BY MOUTH THREE TIMES DAILY     Handicap Placard Misc  by Does not apply route     Incontinence Brief Large Misc  To use as directed.  Use 3x a day     ipratropium-albuterol 0.5-2.5 (3) MG/3ML Soln nebulizer solution  Commonly known as: DUONEB     loratadine 10 MG tablet  Commonly known as: CLARITIN  Take 1 tablet by mouth daily     metoprolol succinate 50 MG extended release tablet  Commonly known as: TOPROL XL  Take 1 tablet by mouth nightly     Multi-Vitamins Tabs  Take 1 tablet by mouth daily     pantoprazole 40 MG tablet  Commonly known as: PROTONIX  TAKE 1 TABLET BY MOUTH DAILY     potassium chloride 20 MEQ extended release tablet  Commonly known as: KLOR-CON M  Take 1 tablet by mouth 2 times daily     rOPINIRole 0.25 MG tablet  Commonly known as: REQUIP  TAKE 1 TABLET BY MOUTH TWICE DAILY     SM Lubricant Eye Drops 0.4-0.3 % ophthalmic solution  Generic drug: polyethyl glycol-propyl glycol 0.4-0.3 %  PLACE 1 DROP INTO BOTH EYES FOUR TIMES DAILY AS NEEDED FOR DRY EYES     tamsulosin 0.4 MG capsule  Commonly known as: FLOMAX  Take 1 capsule by mouth daily     Thera-Gesic 0.5-15 % Crea     tiZANidine 4 MG tablet  Commonly known as: ZANAFLEX  TAKE 1 TABLET BY MOUTH DAILY AS NEEDED     [employer] Orthotics Mens Misc  Provide insurance covered orthotics shoes, wear daily     Ventolin  (90 Base) MCG/ACT inhaler  Generic drug: albuterol sulfate HFA     vitamin B-12 100 MCG tablet  Commonly known as: CYANOCOBALAMIN  TAKE 1 TABLET BY MOUTH DAILY AS NEEDED FOR FATIGUE           Where to Get Your Medications      These medications were sent to [address] [place] [address]    Phone: [phone]   · levoFLOXacin 250 MG tablet         Time Spent on discharge is  34 mins in patient examination, evaluation, counseling, medication reconciliation, discharge plan and follow up. Electronically signed by   Stevo Masters DO  5/6/2021  9:32 PM      Thank you Dr. Isaías Adam, APRN - CNP for the opportunity to be involved in this patient's care.

## 2021-05-10 ENCOUNTER — TELEPHONE (OUTPATIENT)
Dept: PRIMARY CARE CLINIC | Age: 76
End: 2021-05-10

## 2021-05-10 NOTE — TELEPHONE ENCOUNTER
[facility] Initial Follow Up Call    Outreach made within 2 business days of discharge: Yes    Patient: Sandie Law Patient : 1945   MRN: [id number]  Reason for Admission: There are no discharge diagnoses documented for the most recent discharge.   Discharge Date: 21       Spoke with: left voicemail for patient to return call to the office    Discharge department/facility: [facility]    Follow Up  Future Appointments   Date Time Provider Jeri Flynn   2021 11:00 AM JULIAN Milian CNP [facility]   2021  2:00 PM JULIAN Milian CNP [facility]       April [place], [facility]

## 2021-05-13 ENCOUNTER — OFFICE VISIT (OUTPATIENT)
Dept: PRIMARY CARE CLINIC | Age: 76
End: 2021-05-13
Payer: MEDICARE

## 2021-05-13 ENCOUNTER — HOSPITAL ENCOUNTER (OUTPATIENT)
Dept: GENERAL RADIOLOGY | Age: 76
Discharge: HOME | End: 2021-05-15
Payer: MEDICARE

## 2021-05-13 ENCOUNTER — HOSPITAL ENCOUNTER (OUTPATIENT)
Age: 76
Discharge: HOME | End: 2021-05-15
Payer: MEDICARE

## 2021-05-13 VITALS
WEIGHT: 205.4 LBS | DIASTOLIC BLOOD PRESSURE: 72 MMHG | HEART RATE: 70 BPM | OXYGEN SATURATION: 100 % | SYSTOLIC BLOOD PRESSURE: 112 MMHG | BODY MASS INDEX: 29.47 KG/M2 | TEMPERATURE: 97.9 F

## 2021-05-13 DIAGNOSIS — J18.9 PNEUMONIA DUE TO INFECTIOUS ORGANISM, UNSPECIFIED LATERALITY, UNSPECIFIED PART OF LUNG: Primary | ICD-10-CM

## 2021-05-13 DIAGNOSIS — J43.9 PULMONARY EMPHYSEMA, UNSPECIFIED EMPHYSEMA TYPE (HCC): ICD-10-CM

## 2021-05-13 DIAGNOSIS — J18.9 PNEUMONIA OF BOTH LUNGS DUE TO INFECTIOUS ORGANISM, UNSPECIFIED PART OF LUNG: ICD-10-CM

## 2021-05-13 DIAGNOSIS — K59.00 CONSTIPATION, UNSPECIFIED CONSTIPATION TYPE: ICD-10-CM

## 2021-05-13 DIAGNOSIS — I50.32 CHRONIC DIASTOLIC HEART FAILURE (HCC): ICD-10-CM

## 2021-05-13 DIAGNOSIS — J30.2 SEASONAL ALLERGIES: ICD-10-CM

## 2021-05-13 PROCEDURE — 1036F TOBACCO NON-USER: CPT | Performed by: NURSE PRACTITIONER

## 2021-05-13 PROCEDURE — 99214 OFFICE O/P EST MOD 30 MIN: CPT | Performed by: NURSE PRACTITIONER

## 2021-05-13 PROCEDURE — 1123F ACP DISCUSS/DSCN MKR DOCD: CPT | Performed by: NURSE PRACTITIONER

## 2021-05-13 PROCEDURE — G8427 DOCREV CUR MEDS BY ELIG CLIN: HCPCS | Performed by: NURSE PRACTITIONER

## 2021-05-13 PROCEDURE — 1111F DSCHRG MED/CURRENT MED MERGE: CPT | Performed by: NURSE PRACTITIONER

## 2021-05-13 PROCEDURE — G8417 CALC BMI ABV UP PARAM F/U: HCPCS | Performed by: NURSE PRACTITIONER

## 2021-05-13 PROCEDURE — 3023F SPIROM DOC REV: CPT | Performed by: NURSE PRACTITIONER

## 2021-05-13 PROCEDURE — 71045 X-RAY EXAM CHEST 1 VIEW: CPT

## 2021-05-13 PROCEDURE — 3017F COLORECTAL CA SCREEN DOC REV: CPT | Performed by: NURSE PRACTITIONER

## 2021-05-13 PROCEDURE — 4040F PNEUMOC VAC/ADMIN/RCVD: CPT | Performed by: NURSE PRACTITIONER

## 2021-05-13 PROCEDURE — G8926 SPIRO NO PERF OR DOC: HCPCS | Performed by: NURSE PRACTITIONER

## 2021-05-13 RX ORDER — FEXOFENADINE HYDROCHLORIDE 60 MG/1
60 TABLET, FILM COATED ORAL 2 TIMES DAILY
Qty: 60 TABLET | Refills: 2 | Status: SHIPPED
Start: 2021-05-13 | End: 2021-06-02 | Stop reason: CLARIF

## 2021-05-13 RX ORDER — LEVOFLOXACIN 500 MG/1
TABLET, FILM COATED ORAL
COMMUNITY
Start: 2021-05-06 | End: 2021-05-13

## 2021-05-13 RX ORDER — DOCUSATE SODIUM 100 MG/1
100 CAPSULE, LIQUID FILLED ORAL 2 TIMES DAILY PRN
Qty: 60 CAPSULE | Refills: 0 | Status: SHIPPED | OUTPATIENT
Start: 2021-05-13 | End: 2021-11-10 | Stop reason: SDUPTHER

## 2021-05-13 ASSESSMENT — ENCOUNTER SYMPTOMS
DIARRHEA: 0
RHINORRHEA: 0
VOMITING: 0
FACIAL SWELLING: 0
TROUBLE SWALLOWING: 0
ABDOMINAL PAIN: 0
WHEEZING: 0
SHORTNESS OF BREATH: 0
BLOOD IN STOOL: 0

## 2021-05-13 NOTE — PROGRESS NOTES
Post-Discharge Transitional Care Management Services or Hospital Follow Up      Fred Fortune   YOB: 1945    Date of Office Visit:  5/13/2021  Date of Hospital Admission: 5/1/21  Date of Hospital Discharge: 5/6/21  Readmission Risk Score(high >=14%.  Medium >=10%):Readmission Risk Score: 26      Care management risk score Rising risk (score 2-5) and Complex Care (Scores >=6): 7     Non face to face  following discharge, date last encounter closed (first attempt may have been earlier): 5/10/2021  2:53 PM 5/10/2021  2:53 PM    Call initiated 2 business days of discharge: Yes     Patient Active Problem List   Diagnosis    Chronic atrial fibrillation ([address]    Dyslipidemia    Gastroesophageal reflux disease without esophagitis    Osteoarthritis of lumbar spine    OA (osteoarthritis) of knee, bilateral    Foot drop, right    Hallux valgus, acquired, bilateral    Hearing impairment    Essential hypertension    Slow transit constipation    MARIAMA on CPAP    Simple chronic bronchitis ([address]    Hospital-acquired bacterial pneumonia    Fluid overload    Pneumonia due to organism    COPD (chronic obstructive pulmonary disease) ([address]    Chronic diastolic heart failure ([address]    History of bariatric surgery including banding leading to esophageal stricture and aspiration    BPH (benign prostatic hyperplasia)    Myalgia    Atrial fibrillation with slow ventricular response ([address]    Pacemaker pocket hematoma, initial encounter    Cellulitis of chest wall    Pneumonia-community-acquired    Acute hypoxemic respiratory failure (HCC)    SIRS (systemic inflammatory response syndrome) ([address]    Pulmonary hypertension (HCC)    Presence of permanent cardiac pacemaker       Allergies   Allergen Reactions    Darvocet [Propoxyphene N-Acetaminophen] Hives    Other Hives    Oxycodone-Acetaminophen     Propoxyphene      Other reaction(s): Unknown       Medications listed as ordered at the time of (DUONEB) 0.5-2.5 (3) MG/3ML SOLN nebulizer solution  Inhale 1 vial into the lungs every 4 hours as needed              levoFLOXacin (LEVAQUIN) 250 MG tablet  Take 2 tablets by mouth daily for 7 days             loratadine (CLARITIN) 10 MG tablet  Take 1 tablet by mouth daily             Menthol-Methyl Salicylate (THERA-GESIC) 0.5-15 % CREA  Apply topically 2 times daily as needed             metoprolol succinate (TOPROL XL) 50 MG extended release tablet  Take 1 tablet by mouth nightly             Multiple Vitamin (MULTI-VITAMINS) TABS  Take 1 tablet by mouth daily             pantoprazole (PROTONIX) 40 MG tablet  TAKE 1 TABLET BY MOUTH DAILY             potassium chloride (KLOR-CON M) 20 MEQ extended release tablet  Take 1 tablet by mouth 2 times daily             rOPINIRole (REQUIP) 0.25 MG tablet  TAKE 1 TABLET BY MOUTH TWICE DAILY             SM LUBRICANT EYE DROPS 0.4-0.3 % ophthalmic solution  PLACE 1 DROP INTO BOTH EYES FOUR TIMES DAILY AS NEEDED FOR DRY EYES             tamsulosin (FLOMAX) 0.4 MG capsule  Take 1 capsule by mouth daily             tiZANidine (ZANAFLEX) 4 MG tablet  TAKE 1 TABLET BY MOUTH DAILY AS NEEDED             vitamin B-12 (CYANOCOBALAMIN) 100 MCG tablet  TAKE 1 TABLET BY MOUTH DAILY AS NEEDED FOR FATIGUE                   Medications marked \"taking\" at this time  Outpatient Medications Marked as Taking for the 5/13/21 encounter (Office Visit) with Monalisa Arteaga APRN - CNP   Medication Sig Dispense Refill    fexofenadine (ALLEGRA) 60 MG tablet Take 1 tablet by mouth 2 times daily 60 tablet 2    docusate sodium (COLACE) 100 MG capsule Take 1 capsule by mouth 2 times daily as needed for Constipation 60 capsule 0    levoFLOXacin (LEVAQUIN) 250 MG tablet Take 2 tablets by mouth daily for 7 days 7 tablet 0    tiZANidine (ZANAFLEX) 4 MG tablet TAKE 1 TABLET BY MOUTH DAILY AS NEEDED 28 tablet 3    loratadine (CLARITIN) 10 MG tablet Take 1 tablet by mouth daily 90 tablet 1    metoprolol succinate (TOPROL XL) 50 MG extended release tablet Take 1 tablet by mouth nightly 30 tablet 3    bumetanide (BUMEX) 1 MG tablet Take 1 mg by mouth daily Take 2 mg of Bumex in morning, 1 mg of Bumex in afternoon, take 1 mg in evening if weight is >3 above baseline weight      albuterol sulfate HFA (VENTOLIN HFA) 108 (90 Base) MCG/ACT inhaler Inhale 2 puffs into the lungs every 6 hours as needed for Wheezing or Shortness of Breath      benzonatate (TESSALON) 100 MG capsule TAKE 1 CAPSULE BY MOUTH THREE TIMES DAILY AS NEEDED FOR COUGH 30 capsule 3    gabapentin (NEURONTIN) 300 MG capsule TAKE 1 CAPSULE BY MOUTH THREE TIMES DAILY 84 capsule 2    vitamin B-12 (CYANOCOBALAMIN) 100 MCG tablet TAKE 1 TABLET BY MOUTH DAILY AS NEEDED FOR FATIGUE 30 tablet 5    tamsulosin (FLOMAX) 0.4 MG capsule Take 1 capsule by mouth daily 30 capsule 5    pantoprazole (PROTONIX) 40 MG tablet TAKE 1 TABLET BY MOUTH DAILY 28 tablet 3    CREON 02517-05969 units delayed release capsule TAKE 1 CAPSULE BY MOUTH THREE TIMES DAILY WITH MEALS 84 capsule 3    rOPINIRole (REQUIP) 0.25 MG tablet TAKE 1 TABLET BY MOUTH TWICE DAILY 56 tablet 3    carboxymethylcellulose (REFRESH PLUS) 0.5 % SOLN ophthalmic solution Apply 1 drop to eye 4 times daily      Menthol-Methyl Salicylate (THERA-GESIC) 0.5-15 % CREA Apply topically 2 times daily as needed      potassium chloride (KLOR-CON M) 20 MEQ extended release tablet Take 1 tablet by mouth 2 times daily 56 tablet 5    Multiple Vitamin (MULTI-VITAMINS) TABS Take 1 tablet by mouth daily 28 tablet 5    fluticasone (FLONASE) 50 MCG/ACT nasal spray 2 sprays by Nasal route daily 16 g 3    ascorbic acid (VITAMIN C) 500 MG tablet Take 1 tablet by mouth daily 30 tablet 2    ferrous sulfate (FE TABS 325) 325 (65 Fe) MG EC tablet Take 1 tablet by mouth 2 times daily (with meals) 90 tablet 3    SM LUBRICANT EYE DROPS 0.4-0.3 % ophthalmic solution PLACE 1 DROP INTO BOTH EYES FOUR TIMES DAILY AS NEEDED FOR DRY EYES 15 mL 3    Fluticasone furoate-vilanterol (BREO ELLIPTA) 200-25 MCG/INH AEPB inhaler Inhale 1 puff into the lungs daily      Handicap Placard MISC by Does not apply route 1 each 0    Incontinence Supply Disposable (INCONTINENCE BRIEF LARGE) MISC To use as directed. Use 3x a day 90 each 3    Foot Care Products (TRI-BALANCE ORTHOTICS MENS) MISC Provide insurance covered orthotics shoes, wear daily 2 each 0    ipratropium-albuterol (DUONEB) 0.5-2.5 (3) MG/3ML SOLN nebulizer solution Inhale 1 vial into the lungs every 4 hours as needed       Armodafinil 200 MG TABS Take 1 tablet by mouth 2 times daily. Medications patient taking as of now reconciled against medications ordered at time of hospital discharge: Yes    Chief Complaint   Patient presents with    Follow-Up from Hospital     pneumonia    Discuss Medications     stool softener, sinus       Agnes Joaquin states he started feeling poorly the day after his last visit with me. He completed his course of Levaquin. No wheezing. No fevers over the last few days. Does see Dr Ben Liu, pulmonology. He has an appointment coming up on the 18th. On Breo daily, also using nebulizer more than his usually. Known history of COPD. Agnes Joaquin states he is compliant with his sleep apnea machine, wears it nightly.  + dry cough, nothing comes up  He is requesting switching from his cetirizine to a new antihistamine, he feels that Zyrtec is no longer controlling his symptoms. He often has a tickle in the back of his throat. No itchy eyes or runny nose. Agnes Joaquin tells me he is also struggling with constipation. He is using a fiber supplement but is still struggling with bowel movements. He will have bowel urgency around 1:00 in the morning, often 2 to 3 hours after he has laid down to go to bed. Patient's wife states they are scheduled with a new cardiologist, appointment is scheduled for July.   Will be following with [facility]      Inpatient course: Discharge summary reviewed- see chart. Hospital Course:      Brief History:  As documented in the medical record:  Óscar Dorsey is a 77 y.o. male who presents to the emergency department complaining of fever, chills, shortness of breath, and lower abdominal \"burning pain\". He has been feeling this way for the past 30 hours. He has had no vomiting. He reports he has not been able to get out of bed because of the fever and chills. He was found at home to be hypoxic to 88%. He has never been diagnosed with Covid and has not and does not plan on getting the Covid vaccine as he does not trust it. He had [employer] dual-chamber pacemaker placed on 4/9/2021 secondary to sick sinus syndrome, later he had pacemaker pocket hematoma due to which Xarelto was stopped which he was supposed to restart after visiting cardiologist in the office, it is not clear if he restarted Xarelto or not. His history of previous gastric bypass surgery, atrial fibrillation which is chronic, chronic diastolic CHF, obstructive sleep apnea with CPAP use, COPD, essential hypertension, dyslipidemia, bilateral foot drop(wears braces to walk)  His rapid Covid is negative  Chest x-ray shows patchy bilateral airspace disease  His proBNP is 5141 which is chronically high and it was higher during the previous admission     The plan included:  Continue IV Levaquin  Follow pending culture results-negative so far  DVT prophylaxis  Continue aerosol treatments, inhaled steroids and oxygen  PT OT, check orthostatics\"      Echo 6/2020:    Left Ventricle: Systolic function is normal with an ejection fraction   of 65-70%.   Tricuspid Valve: The right ventricular systolic pressure is mild to   moderately elevated. RVSP estimated at 30-35 mmHg.   Tricuspid Valve: There is mild to moderate pulmonary hypertension. Chest x-ray 5/5:  Impression:  Worsening pulmonary opacities consistent with worsening airspace disease.       The patient's condition was stabilized  Discharge planning initiated      Interval history/Current status: Improving    Review of Systems   Constitutional: Negative for appetite change, fatigue, fever and unexpected weight change. HENT: Positive for congestion and ear pain. Negative for ear discharge, facial swelling, hearing loss, rhinorrhea and trouble swallowing. Eyes: Negative for visual disturbance. Respiratory: Negative for shortness of breath and wheezing. Cardiovascular: Positive for leg swelling. Negative for chest pain and palpitations. Gastrointestinal: Negative for abdominal pain, blood in stool, diarrhea and vomiting. Endocrine: Negative for polydipsia and polyuria. Genitourinary: Negative for dysuria, frequency and hematuria. Musculoskeletal: Negative for myalgias and neck pain. Skin: Negative for wound. Neurological: Positive for dizziness. Negative for seizures, syncope, numbness and headaches. Psychiatric/Behavioral: Negative for suicidal ideas. The patient is not nervous/anxious. Vitals:    05/13/21 1102   BP: 112/72   Site: Left Upper Arm   Position: Sitting   Cuff Size: Medium Adult   Pulse: 70   Temp: 97.9 °F (36.6 °C)   TempSrc: Temporal   SpO2: 100%   Weight: 205 lb 6.4 oz (93.2 kg)     Body mass index is 29.47 kg/m². Wt Readings from Last 3 Encounters:   05/13/21 205 lb 6.4 oz (93.2 kg)   05/06/21 207 lb 1.6 oz (93.9 kg)   04/27/21 207 lb 3.2 oz (94 kg)     BP Readings from Last 3 Encounters:   05/13/21 112/72   05/06/21 (!) 111/54   04/27/21 (!) 87/55       Physical Exam  Vitals signs reviewed. Constitutional:       Appearance: Normal appearance. He is well-developed and well-groomed. He is not ill-appearing or toxic-appearing. HENT:      Head: Normocephalic and atraumatic. Right Ear: External ear normal.      Left Ear: External ear normal.      Nose: Nose normal.   Eyes:      General: Lids are normal. No scleral icterus.      Conjunctiva/sclera: Conjunctivae normal.      Pupils: Pupils are equal, round, and reactive to light. Cardiovascular:      Rate and Rhythm: Normal rate and regular rhythm. Heart sounds: Normal heart sounds. Pulmonary:      Effort: Pulmonary effort is normal.      Breath sounds: No decreased air movement. Decreased breath sounds present. No wheezing or rhonchi. Abdominal:      Palpations: Abdomen is soft. There is no mass. Tenderness: There is no abdominal tenderness. There is no right CVA tenderness or left CVA tenderness. Musculoskeletal:         General: No tenderness. Right lower leg: Edema present. Left lower leg: Edema present. Skin:     General: Skin is warm and dry. Neurological:      Mental Status: He is alert and oriented to person, place, and time. Psychiatric:         Behavior: Behavior is cooperative. Assessment/Plan:  1. Pneumonia due to infectious organism, unspecified laterality, unspecified part of lung  Resolving. Completed. Pulse ox at baseline on room air.  - IN DISCHARGE MEDS RECONCILED W/ CURRENT OUTPATIENT MED LIST    2. Seasonal allergies  Stop cetirizine, switch to Allegra. - fexofenadine (ALLEGRA) 60 MG tablet; Take 1 tablet by mouth 2 times daily  Dispense: 60 tablet; Refill: 2    3. Constipation, unspecified constipation type  - docusate sodium (COLACE) 100 MG capsule; Take 1 capsule by mouth 2 times daily as needed for Constipation  Dispense: 60 capsule; Refill: 0    4. Chronic diastolic heart failure (HCC)  No evidence of increased fluid at this time. Lung bases diminished, no crackles. Patient following with cardiology. 5. Pulmonary emphysema, unspecified emphysema type [facility])  Upcoming appointment with pulmonology next week. Advised patient x-rays demonstrate worsening lung disease. Consider discussing COPD treatment at visit.         Medical Decision Making: moderate complexity

## 2021-05-13 NOTE — PROGRESS NOTES
--Pippa Badillo MA on 5/13/2021 at 11:02 AM  Visit Information    Have you changed or started any medications since your last visit including any over-the-counter medicines, vitamins, or herbal medicines? no   Are you having any side effects from any of your medications? -  no  Have you stopped taking any of your medications? Is so, why? -  no    Have you seen any other physician or provider since your last visit? No  Have you had any other diagnostic tests since your last visit? No  Have you been seen in the emergency room and/or had an admission to a hospital since we last saw you? No  Have you had your routine dental cleaning in the past 6 months? no    Have you activated your [employer] account? If not, what are your barriers?  Yes     Patient Care Team:  JULIAN Blakely CNP as PCP - General (Family Medicine)  JULIAN Blakely CNP as PCP - [facility] [id number] [facility] [place] as Consulting Physician (Gastroenterology)  Darin Truong MD as Consulting Physician (Cardiology)  Augusto Hawkins MD as Consulting Physician (Pulmonology)  James Orlando MD as Consulting Physician (Urology)    Medical History Review  Past Medical, Family, and Social History reviewed and does not contribute to the patient presenting condition    Health Maintenance   Topic Date Due    COVID-19 Vaccine (1) Never done    Shingles Vaccine (1 of 2) Never done   [employer] Visit (AWV)  Never done    Flu vaccine (Season Ended) 11/05/2021 (Originally 9/1/2021)    Potassium monitoring  05/06/2022    Creatinine monitoring  05/06/2022    Lipid screen  04/01/2026    DTaP/Tdap/Td vaccine (2 - Td) 04/22/2029    Colon cancer screen colonoscopy  04/05/2031    Pneumococcal 65+ years Vaccine  Completed    AAA screen  Completed    Hepatitis C screen  Completed    Hepatitis A vaccine  Aged Out    Hepatitis B vaccine  Aged Out    Hib vaccine  Aged Out    Meningococcal (ACWY) vaccine  Aged Out

## 2021-05-18 ENCOUNTER — TELEPHONE (OUTPATIENT)
Dept: PRIMARY CARE CLINIC | Age: 76
End: 2021-05-18

## 2021-06-02 ENCOUNTER — PATIENT MESSAGE (OUTPATIENT)
Dept: PRIMARY CARE CLINIC | Age: 76
End: 2021-06-02

## 2021-06-02 DIAGNOSIS — J30.2 SEASONAL ALLERGIES: Primary | ICD-10-CM

## 2021-06-02 RX ORDER — CETIRIZINE HYDROCHLORIDE 10 MG/1
10 TABLET ORAL DAILY
Qty: 30 TABLET | Refills: 5 | Status: SHIPPED | OUTPATIENT
Start: 2021-06-02 | End: 2021-11-15

## 2021-06-02 NOTE — TELEPHONE ENCOUNTER
Health Maintenance   Topic Date Due    COVID-19 Vaccine (1) Never done    Shingles Vaccine (1 of 2) Never done   [employer] Visit (AWV)  Never done    Flu vaccine (Season Ended) 11/05/2021 (Originally 9/1/2021)    Potassium monitoring  05/06/2022    Creatinine monitoring  05/06/2022    Lipid screen  04/01/2026    DTaP/Tdap/Td vaccine (2 - Td or Tdap) 04/22/2029    Colon cancer screen colonoscopy  04/05/2031    Pneumococcal 65+ years Vaccine  Completed    AAA screen  Completed    Hepatitis C screen  Completed    Hepatitis A vaccine  Aged Out    Hepatitis B vaccine  Aged Out    Hib vaccine  Aged Out    Meningococcal (ACWY) vaccine  Aged Out             (applicable per patient's age: Cancer Screenings, Depression Screening, Fall Risk Screening, Immunizations)    LDL Cholesterol (mg/dL)   Date Value   04/01/2021 49     LDL Calculated (mg/dL)   Date Value   10/30/2013 67     AST (U/L)   Date Value   05/01/2021 46 (H)     ALT (U/L)   Date Value   05/01/2021 17     BUN (mg/dL)   Date Value   05/06/2021 14      (goal A1C is < 7)   (goal LDL is <100) need 30-50% reduction from baseline     BP Readings from Last 3 Encounters:   05/13/21 112/72   05/06/21 (!) 111/54   04/27/21 (!) 87/55    (goal /80)      All Future Testing planned in [facility]:  Lab Frequency Next Occurrence       Next Visit Date:  Future Appointments   Date Time Provider Jeri Flynn   7/28/2021  2:00 PM JULIAN Alex -  [facility]            Patient Active Problem List:     Chronic atrial fibrillation (HCC)     Dyslipidemia     Gastroesophageal reflux disease without esophagitis     Osteoarthritis of lumbar spine     OA (osteoarthritis) of knee, bilateral     Foot drop, right     Hallux valgus, acquired, bilateral     Hearing impairment     Essential hypertension     Slow transit constipation     MARIAMA on CPAP     Simple chronic bronchitis (HCC)     Hospital-acquired bacterial pneumonia     Fluid overload Pneumonia due to organism     COPD (chronic obstructive pulmonary disease) (HCC)     Chronic diastolic heart failure (HCC)     History of bariatric surgery including banding leading to esophageal stricture and aspiration     BPH (benign prostatic hyperplasia)     Myalgia     Atrial fibrillation with slow ventricular response (HCC)     Pacemaker pocket hematoma, initial encounter     Cellulitis of chest wall     Pneumonia-community-acquired     Acute hypoxemic respiratory failure (HCC)     SIRS (systemic inflammatory response syndrome) ([address]     Pulmonary hypertension (HCC)     Presence of permanent cardiac pacemaker

## 2021-06-16 ENCOUNTER — HOSPITAL ENCOUNTER (OUTPATIENT)
Age: 76
Setting detail: SPECIMEN
Discharge: HOME | End: 2021-06-16
Payer: MEDICARE

## 2021-06-16 LAB
THYROXINE, FREE: 1.26 NG/DL (ref 0.93–1.7)
TSH SERPL DL<=0.05 MIU/L-ACNC: 1.32 MIU/L (ref 0.3–5)

## 2021-06-16 PROCEDURE — 36415 COLL VENOUS BLD VENIPUNCTURE: CPT

## 2021-06-16 PROCEDURE — 84439 ASSAY OF FREE THYROXINE: CPT

## 2021-06-16 PROCEDURE — 84443 ASSAY THYROID STIM HORMONE: CPT

## 2021-06-29 DIAGNOSIS — K86.89 PANCREATIC INSUFFICIENCY: ICD-10-CM

## 2021-06-29 DIAGNOSIS — G62.9 NEUROPATHY: ICD-10-CM

## 2021-06-29 DIAGNOSIS — G25.81 RLS (RESTLESS LEGS SYNDROME): ICD-10-CM

## 2021-06-29 DIAGNOSIS — Z76.0 MEDICATION REFILL: ICD-10-CM

## 2021-06-30 RX ORDER — POTASSIUM CHLORIDE 20 MEQ/1
20 TABLET, EXTENDED RELEASE ORAL 2 TIMES DAILY
Qty: 56 TABLET | Refills: 5 | Status: SHIPPED | OUTPATIENT
Start: 2021-06-30 | End: 2021-12-10

## 2021-06-30 RX ORDER — ROPINIROLE 0.25 MG/1
TABLET, FILM COATED ORAL
Qty: 56 TABLET | Refills: 3 | Status: SHIPPED | OUTPATIENT
Start: 2021-06-30 | End: 2021-11-10 | Stop reason: SDUPTHER

## 2021-06-30 RX ORDER — GABAPENTIN 300 MG/1
CAPSULE ORAL
Qty: 84 CAPSULE | Refills: 2 | Status: SHIPPED | OUTPATIENT
Start: 2021-06-30 | End: 2021-09-20

## 2021-06-30 RX ORDER — METOPROLOL SUCCINATE 50 MG/1
50 TABLET, EXTENDED RELEASE ORAL NIGHTLY
Qty: 30 TABLET | Refills: 3 | Status: SHIPPED | OUTPATIENT
Start: 2021-06-30 | End: 2021-09-20

## 2021-06-30 RX ORDER — DIPHENOXYLATE HYDROCHLORIDE AND ATROPINE SULFATE 2.5; .025 MG/1; MG/1
1 TABLET ORAL DAILY
Qty: 28 TABLET | Refills: 5 | Status: SHIPPED | OUTPATIENT
Start: 2021-06-30 | End: 2021-12-10

## 2021-06-30 RX ORDER — PANCRELIPASE 24000; 76000; 120000 [USP'U]/1; [USP'U]/1; [USP'U]/1
CAPSULE, DELAYED RELEASE PELLETS ORAL
Qty: 84 CAPSULE | Refills: 3 | Status: SHIPPED | OUTPATIENT
Start: 2021-06-30 | End: 2021-09-20

## 2021-07-02 ENCOUNTER — TELEPHONE (OUTPATIENT)
Dept: PRIMARY CARE CLINIC | Age: 76
End: 2021-07-02

## 2021-07-02 NOTE — TELEPHONE ENCOUNTER
Jaime Villagran is needing clarification on if patient still needs to take Loratadine 10 mg in addition to Zyrtec 10 mg, pls advise    Health Maintenance   Topic Date Due    COVID-19 Vaccine (1) Never done    Shingles Vaccine (1 of 2) Never done   [employer] Visit (AWV)  Never done    Flu vaccine (1) 09/01/2021    Potassium monitoring  05/06/2022    Creatinine monitoring  05/06/2022    Lipid screen  04/01/2026    DTaP/Tdap/Td vaccine (2 - Td or Tdap) 04/22/2029    Colon cancer screen colonoscopy  04/05/2031    Pneumococcal 65+ years Vaccine  Completed    AAA screen  Completed    Hepatitis C screen  Completed    Hepatitis A vaccine  Aged Out    Hepatitis B vaccine  Aged Out    Hib vaccine  Aged Out    Meningococcal (ACWY) vaccine  Aged Out             (applicable per patient's age: Cancer Screenings, Depression Screening, Fall Risk Screening, Immunizations)    LDL Cholesterol (mg/dL)   Date Value   04/01/2021 49     LDL Calculated (mg/dL)   Date Value   10/30/2013 67     AST (U/L)   Date Value   05/01/2021 46 (H)     ALT (U/L)   Date Value   05/01/2021 17     BUN (mg/dL)   Date Value   05/06/2021 14      (goal A1C is < 7)   (goal LDL is <100) need 30-50% reduction from baseline     BP Readings from Last 3 Encounters:   05/13/21 112/72   05/06/21 (!) 111/54   04/27/21 (!) 87/55    (goal /80)      All Future Testing planned in [facility]:  Lab Frequency Next Occurrence       Next Visit Date:  Future Appointments   Date Time Provider Jeri Flynn   7/28/2021  2:00 PM Reymundo Yuen, APRN -  [facility] [place]            Patient Active Problem List:     Chronic atrial fibrillation (HCC)     Dyslipidemia     Gastroesophageal reflux disease without esophagitis     Osteoarthritis of lumbar spine     OA (osteoarthritis) of knee, bilateral     Foot drop, right     Hallux valgus, acquired, bilateral     Hearing impairment     Essential hypertension     Slow transit constipation     MARIAMA on CPAP     Simple chronic bronchitis (HCC)     Hospital-acquired bacterial pneumonia     Fluid overload     Pneumonia due to organism     COPD (chronic obstructive pulmonary disease) (HCC)     Chronic diastolic heart failure (HCC)     History of bariatric surgery including banding leading to esophageal stricture and aspiration     BPH (benign prostatic hyperplasia)     Myalgia     Atrial fibrillation with slow ventricular response (HCC)     Pacemaker pocket hematoma, initial encounter     Cellulitis of chest wall     Pneumonia-community-acquired     Acute hypoxemic respiratory failure (HCC)     SIRS (systemic inflammatory response syndrome) ([address]     Pulmonary hypertension ([address]     Presence of permanent cardiac pacemaker

## 2021-07-14 ENCOUNTER — APPOINTMENT (OUTPATIENT)
Dept: CT IMAGING | Age: 76
DRG: 178 | End: 2021-07-14
Payer: MEDICARE

## 2021-07-14 ENCOUNTER — APPOINTMENT (OUTPATIENT)
Dept: GENERAL RADIOLOGY | Age: 76
DRG: 178 | End: 2021-07-14
Payer: MEDICARE

## 2021-07-14 ENCOUNTER — HOSPITAL ENCOUNTER (INPATIENT)
Age: 76
LOS: 2 days | Discharge: HOME | DRG: 178 | End: 2021-07-16
Attending: EMERGENCY MEDICINE | Admitting: FAMILY MEDICINE
Payer: MEDICARE

## 2021-07-14 DIAGNOSIS — J18.9 PNEUMONIA OF BOTH LUNGS DUE TO INFECTIOUS ORGANISM, UNSPECIFIED PART OF LUNG: Primary | ICD-10-CM

## 2021-07-14 DIAGNOSIS — R11.2 NON-INTRACTABLE VOMITING WITH NAUSEA, UNSPECIFIED VOMITING TYPE: ICD-10-CM

## 2021-07-14 DIAGNOSIS — R10.13 EPIGASTRIC PAIN: ICD-10-CM

## 2021-07-14 PROBLEM — R11.10 NON-INTRACTABLE VOMITING: Status: ACTIVE | Noted: 2018-11-16

## 2021-07-14 PROBLEM — J69.0 ASPIRATION PNEUMONIA (HCC): Status: ACTIVE | Noted: 2021-07-14

## 2021-07-14 LAB
ABSOLUTE EOS #: 0.09 K/UL (ref 0–0.4)
ABSOLUTE IMMATURE GRANULOCYTE: 0.09 K/UL (ref 0–0.3)
ABSOLUTE LYMPH #: 0.35 K/UL (ref 1–4.8)
ABSOLUTE MONO #: 0.53 K/UL (ref 0.2–0.8)
ALBUMIN SERPL-MCNC: 3.9 G/DL (ref 3.5–5.2)
ALBUMIN/GLOBULIN RATIO: ABNORMAL (ref 1–2.5)
ALP BLD-CCNC: 95 U/L (ref 40–129)
ALT SERPL-CCNC: 8 U/L (ref 5–41)
ANION GAP SERPL CALCULATED.3IONS-SCNC: 11 MMOL/L (ref 9–17)
AST SERPL-CCNC: 23 U/L
BASOPHILS # BLD: 0 %
BASOPHILS ABSOLUTE: 0 K/UL (ref 0–0.2)
BILIRUB SERPL-MCNC: 1.2 MG/DL (ref 0.3–1.2)
BILIRUBIN DIRECT: 0.51 MG/DL
BILIRUBIN URINE: NEGATIVE
BILIRUBIN, INDIRECT: 0.69 MG/DL (ref 0–1)
BUN BLDV-MCNC: 12 MG/DL (ref 8–23)
BUN/CREAT BLD: 14 (ref 9–20)
CALCIUM SERPL-MCNC: 9.3 MG/DL (ref 8.6–10.4)
CHLORIDE BLD-SCNC: 99 MMOL/L (ref 98–107)
CO2: 29 MMOL/L (ref 20–31)
COLOR: YELLOW
COMMENT UA: NORMAL
CREAT SERPL-MCNC: 0.88 MG/DL (ref 0.7–1.2)
DIFFERENTIAL TYPE: ABNORMAL
EOSINOPHILS RELATIVE PERCENT: 1 % (ref 1–4)
GFR AFRICAN AMERICAN: >60 ML/MIN
GFR NON-AFRICAN AMERICAN: >60 ML/MIN
GFR SERPL CREATININE-BSD FRML MDRD: ABNORMAL ML/MIN/{1.73_M2}
GFR SERPL CREATININE-BSD FRML MDRD: ABNORMAL ML/MIN/{1.73_M2}
GLOBULIN: ABNORMAL G/DL (ref 1.5–3.8)
GLUCOSE BLD-MCNC: 122 MG/DL (ref 70–99)
GLUCOSE URINE: NEGATIVE
HCT VFR BLD CALC: 39.9 % (ref 40.7–50.3)
HEMOGLOBIN: 12.8 G/DL (ref 13–17)
IMMATURE GRANULOCYTES: 1 %
KETONES, URINE: NEGATIVE
LACTIC ACID: 1.4 MMOL/L (ref 0.5–2.2)
LACTIC ACID: 1.8 MMOL/L (ref 0.5–2.2)
LEUKOCYTE ESTERASE, URINE: NEGATIVE
LIPASE: 8 U/L (ref 13–60)
LYMPHOCYTES # BLD: 4 % (ref 24–44)
MAGNESIUM: 1.5 MG/DL (ref 1.6–2.6)
MCH RBC QN AUTO: 32.3 PG (ref 25.2–33.5)
MCHC RBC AUTO-ENTMCNC: 32.1 G/DL (ref 28.4–34.8)
MCV RBC AUTO: 100.8 FL (ref 82.6–102.9)
MONOCYTES # BLD: 6 % (ref 1–7)
NITRITE, URINE: NEGATIVE
NRBC AUTOMATED: 0 PER 100 WBC
PDW BLD-RTO: 15 % (ref 11.8–14.4)
PH UA: 5.5 (ref 5–8)
PLATELET # BLD: 154 K/UL (ref 138–453)
PLATELET ESTIMATE: ABNORMAL
PMV BLD AUTO: 8.6 FL (ref 8.1–13.5)
POTASSIUM SERPL-SCNC: 3.8 MMOL/L (ref 3.7–5.3)
PROTEIN UA: NEGATIVE
RBC # BLD: 3.96 M/UL (ref 4.21–5.77)
RBC # BLD: ABNORMAL 10*6/UL
SARS-COV-2, RAPID: NOT DETECTED
SEG NEUTROPHILS: 88 % (ref 36–66)
SEGMENTED NEUTROPHILS ABSOLUTE COUNT: 7.74 K/UL (ref 1.8–7.7)
SODIUM BLD-SCNC: 139 MMOL/L (ref 135–144)
SPECIFIC GRAVITY UA: 1.01 (ref 1–1.03)
SPECIMEN DESCRIPTION: NORMAL
TOTAL PROTEIN: 6.9 G/DL (ref 6.4–8.3)
TROPONIN INTERP: ABNORMAL
TROPONIN INTERP: ABNORMAL
TROPONIN T: ABNORMAL NG/ML
TROPONIN T: ABNORMAL NG/ML
TROPONIN, HIGH SENSITIVITY: 52 NG/L (ref 0–22)
TROPONIN, HIGH SENSITIVITY: 58 NG/L (ref 0–22)
TURBIDITY: CLEAR
URINE HGB: NEGATIVE
UROBILINOGEN, URINE: NORMAL
WBC # BLD: 8.8 K/UL (ref 3.5–11.3)
WBC # BLD: ABNORMAL 10*3/UL

## 2021-07-14 PROCEDURE — 74177 CT ABD & PELVIS W/CONTRAST: CPT

## 2021-07-14 PROCEDURE — 2580000003 HC RX 258: Performed by: EMERGENCY MEDICINE

## 2021-07-14 PROCEDURE — 2580000003 HC RX 258: Performed by: FAMILY MEDICINE

## 2021-07-14 PROCEDURE — 71250 CT THORAX DX C-: CPT

## 2021-07-14 PROCEDURE — 1200000000 HC SEMI PRIVATE

## 2021-07-14 PROCEDURE — 87040 BLOOD CULTURE FOR BACTERIA: CPT

## 2021-07-14 PROCEDURE — 83605 ASSAY OF LACTIC ACID: CPT

## 2021-07-14 PROCEDURE — APPSS45 APP SPLIT SHARED TIME 31-45 MINUTES: Performed by: NURSE PRACTITIONER

## 2021-07-14 PROCEDURE — 83735 ASSAY OF MAGNESIUM: CPT

## 2021-07-14 PROCEDURE — 84484 ASSAY OF TROPONIN QUANT: CPT

## 2021-07-14 PROCEDURE — 94761 N-INVAS EAR/PLS OXIMETRY MLT: CPT

## 2021-07-14 PROCEDURE — 6360000002 HC RX W HCPCS: Performed by: EMERGENCY MEDICINE

## 2021-07-14 PROCEDURE — 6370000000 HC RX 637 (ALT 250 FOR IP): Performed by: NURSE PRACTITIONER

## 2021-07-14 PROCEDURE — APPNB60 APP NON BILLABLE TIME 46-60 MINS: Performed by: NURSE PRACTITIONER

## 2021-07-14 PROCEDURE — 99283 EMERGENCY DEPT VISIT LOW MDM: CPT

## 2021-07-14 PROCEDURE — 80076 HEPATIC FUNCTION PANEL: CPT

## 2021-07-14 PROCEDURE — 87205 SMEAR GRAM STAIN: CPT

## 2021-07-14 PROCEDURE — 81003 URINALYSIS AUTO W/O SCOPE: CPT

## 2021-07-14 PROCEDURE — 96365 THER/PROPH/DIAG IV INF INIT: CPT

## 2021-07-14 PROCEDURE — 96361 HYDRATE IV INFUSION ADD-ON: CPT

## 2021-07-14 PROCEDURE — 2580000003 HC RX 258: Performed by: NURSE PRACTITIONER

## 2021-07-14 PROCEDURE — 6360000002 HC RX W HCPCS: Performed by: NURSE PRACTITIONER

## 2021-07-14 PROCEDURE — 93005 ELECTROCARDIOGRAM TRACING: CPT | Performed by: EMERGENCY MEDICINE

## 2021-07-14 PROCEDURE — 87635 SARS-COV-2 COVID-19 AMP PRB: CPT

## 2021-07-14 PROCEDURE — 83690 ASSAY OF LIPASE: CPT

## 2021-07-14 PROCEDURE — 96375 TX/PRO/DX INJ NEW DRUG ADDON: CPT

## 2021-07-14 PROCEDURE — 85025 COMPLETE CBC W/AUTO DIFF WBC: CPT

## 2021-07-14 PROCEDURE — 71045 X-RAY EXAM CHEST 1 VIEW: CPT

## 2021-07-14 PROCEDURE — 36415 COLL VENOUS BLD VENIPUNCTURE: CPT

## 2021-07-14 PROCEDURE — 87070 CULTURE OTHR SPECIMN AEROBIC: CPT

## 2021-07-14 PROCEDURE — 80048 BASIC METABOLIC PNL TOTAL CA: CPT

## 2021-07-14 PROCEDURE — 6360000004 HC RX CONTRAST MEDICATION: Performed by: EMERGENCY MEDICINE

## 2021-07-14 PROCEDURE — 99222 1ST HOSP IP/OBS MODERATE 55: CPT | Performed by: FAMILY MEDICINE

## 2021-07-14 PROCEDURE — 94640 AIRWAY INHALATION TREATMENT: CPT

## 2021-07-14 RX ORDER — DOCUSATE SODIUM 100 MG/1
100 CAPSULE, LIQUID FILLED ORAL 2 TIMES DAILY PRN
Status: DISCONTINUED | OUTPATIENT
Start: 2021-07-14 | End: 2021-07-16 | Stop reason: HOSPADM

## 2021-07-14 RX ORDER — IPRATROPIUM BROMIDE AND ALBUTEROL SULFATE 2.5; .5 MG/3ML; MG/3ML
1 SOLUTION RESPIRATORY (INHALATION) EVERY 4 HOURS PRN
Status: DISCONTINUED | OUTPATIENT
Start: 2021-07-14 | End: 2021-07-16 | Stop reason: HOSPADM

## 2021-07-14 RX ORDER — TIZANIDINE 4 MG/1
4 TABLET ORAL EVERY 6 HOURS PRN
Status: DISCONTINUED | OUTPATIENT
Start: 2021-07-14 | End: 2021-07-16 | Stop reason: HOSPADM

## 2021-07-14 RX ORDER — ALBUTEROL SULFATE 90 UG/1
2 AEROSOL, METERED RESPIRATORY (INHALATION) EVERY 6 HOURS PRN
Status: DISCONTINUED | OUTPATIENT
Start: 2021-07-14 | End: 2021-07-16 | Stop reason: HOSPADM

## 2021-07-14 RX ORDER — IPRATROPIUM BROMIDE AND ALBUTEROL SULFATE 2.5; .5 MG/3ML; MG/3ML
1 SOLUTION RESPIRATORY (INHALATION)
Status: DISCONTINUED | OUTPATIENT
Start: 2021-07-14 | End: 2021-07-14

## 2021-07-14 RX ORDER — ONDANSETRON 2 MG/ML
4 INJECTION INTRAMUSCULAR; INTRAVENOUS EVERY 6 HOURS PRN
Status: DISCONTINUED | OUTPATIENT
Start: 2021-07-14 | End: 2021-07-16 | Stop reason: HOSPADM

## 2021-07-14 RX ORDER — SODIUM CHLORIDE 0.9 % (FLUSH) 0.9 %
5-40 SYRINGE (ML) INJECTION EVERY 12 HOURS SCHEDULED
Status: DISCONTINUED | OUTPATIENT
Start: 2021-07-14 | End: 2021-07-16 | Stop reason: HOSPADM

## 2021-07-14 RX ORDER — ONDANSETRON 2 MG/ML
4 INJECTION INTRAMUSCULAR; INTRAVENOUS ONCE
Status: COMPLETED | OUTPATIENT
Start: 2021-07-14 | End: 2021-07-14

## 2021-07-14 RX ORDER — FLUTICASONE PROPIONATE 50 MCG
2 SPRAY, SUSPENSION (ML) NASAL DAILY
Status: DISCONTINUED | OUTPATIENT
Start: 2021-07-15 | End: 2021-07-16 | Stop reason: HOSPADM

## 2021-07-14 RX ORDER — METOPROLOL SUCCINATE 50 MG/1
50 TABLET, EXTENDED RELEASE ORAL NIGHTLY
Status: DISCONTINUED | OUTPATIENT
Start: 2021-07-14 | End: 2021-07-16 | Stop reason: HOSPADM

## 2021-07-14 RX ORDER — 0.9 % SODIUM CHLORIDE 0.9 %
500 INTRAVENOUS SOLUTION INTRAVENOUS ONCE
Status: COMPLETED | OUTPATIENT
Start: 2021-07-14 | End: 2021-07-14

## 2021-07-14 RX ORDER — SODIUM CHLORIDE 9 MG/ML
INJECTION, SOLUTION INTRAVENOUS CONTINUOUS
Status: DISCONTINUED | OUTPATIENT
Start: 2021-07-14 | End: 2021-07-16 | Stop reason: HOSPADM

## 2021-07-14 RX ORDER — TAMSULOSIN HYDROCHLORIDE 0.4 MG/1
0.4 CAPSULE ORAL DAILY
Status: DISCONTINUED | OUTPATIENT
Start: 2021-07-14 | End: 2021-07-16 | Stop reason: HOSPADM

## 2021-07-14 RX ORDER — PANTOPRAZOLE SODIUM 40 MG/10ML
40 INJECTION, POWDER, LYOPHILIZED, FOR SOLUTION INTRAVENOUS DAILY
Status: DISCONTINUED | OUTPATIENT
Start: 2021-07-14 | End: 2021-07-16 | Stop reason: HOSPADM

## 2021-07-14 RX ORDER — AZITHROMYCIN 250 MG/1
500 TABLET, FILM COATED ORAL EVERY 24 HOURS
Status: DISCONTINUED | OUTPATIENT
Start: 2021-07-15 | End: 2021-07-15

## 2021-07-14 RX ORDER — IPRATROPIUM BROMIDE AND ALBUTEROL SULFATE 2.5; .5 MG/3ML; MG/3ML
1 SOLUTION RESPIRATORY (INHALATION) 2 TIMES DAILY
Status: DISCONTINUED | OUTPATIENT
Start: 2021-07-15 | End: 2021-07-15

## 2021-07-14 RX ORDER — BENZONATATE 100 MG/1
100 CAPSULE ORAL 3 TIMES DAILY PRN
Status: DISCONTINUED | OUTPATIENT
Start: 2021-07-14 | End: 2021-07-16 | Stop reason: HOSPADM

## 2021-07-14 RX ORDER — BUDESONIDE AND FORMOTEROL FUMARATE DIHYDRATE 160; 4.5 UG/1; UG/1
2 AEROSOL RESPIRATORY (INHALATION) 2 TIMES DAILY
Status: DISCONTINUED | OUTPATIENT
Start: 2021-07-14 | End: 2021-07-16 | Stop reason: HOSPADM

## 2021-07-14 RX ORDER — SODIUM CHLORIDE 9 MG/ML
10 INJECTION INTRAVENOUS DAILY
Status: DISCONTINUED | OUTPATIENT
Start: 2021-07-14 | End: 2021-07-16 | Stop reason: HOSPADM

## 2021-07-14 RX ORDER — ARMODAFINIL 200 MG/1
1 TABLET ORAL 2 TIMES DAILY
Status: DISCONTINUED | OUTPATIENT
Start: 2021-07-14 | End: 2021-07-16 | Stop reason: HOSPADM

## 2021-07-14 RX ORDER — GABAPENTIN 300 MG/1
300 CAPSULE ORAL 3 TIMES DAILY
Status: DISCONTINUED | OUTPATIENT
Start: 2021-07-14 | End: 2021-07-16 | Stop reason: HOSPADM

## 2021-07-14 RX ORDER — SODIUM CHLORIDE 9 MG/ML
25 INJECTION, SOLUTION INTRAVENOUS PRN
Status: DISCONTINUED | OUTPATIENT
Start: 2021-07-14 | End: 2021-07-16 | Stop reason: HOSPADM

## 2021-07-14 RX ORDER — ROPINIROLE 0.25 MG/1
0.25 TABLET, FILM COATED ORAL 2 TIMES DAILY
Status: DISCONTINUED | OUTPATIENT
Start: 2021-07-14 | End: 2021-07-16 | Stop reason: HOSPADM

## 2021-07-14 RX ORDER — PANTOPRAZOLE SODIUM 40 MG/1
40 TABLET, DELAYED RELEASE ORAL DAILY
Status: DISCONTINUED | OUTPATIENT
Start: 2021-07-14 | End: 2021-07-14

## 2021-07-14 RX ORDER — SODIUM CHLORIDE 0.9 % (FLUSH) 0.9 %
10 SYRINGE (ML) INJECTION PRN
Status: DISCONTINUED | OUTPATIENT
Start: 2021-07-14 | End: 2021-07-14 | Stop reason: SDUPTHER

## 2021-07-14 RX ORDER — LANOLIN ALCOHOL/MO/W.PET/CERES
325 CREAM (GRAM) TOPICAL 2 TIMES DAILY WITH MEALS
Status: DISCONTINUED | OUTPATIENT
Start: 2021-07-14 | End: 2021-07-16 | Stop reason: HOSPADM

## 2021-07-14 RX ORDER — 0.9 % SODIUM CHLORIDE 0.9 %
80 INTRAVENOUS SOLUTION INTRAVENOUS ONCE
Status: COMPLETED | OUTPATIENT
Start: 2021-07-14 | End: 2021-07-14

## 2021-07-14 RX ORDER — CETIRIZINE HYDROCHLORIDE 10 MG/1
10 TABLET ORAL DAILY
Status: DISCONTINUED | OUTPATIENT
Start: 2021-07-14 | End: 2021-07-16 | Stop reason: HOSPADM

## 2021-07-14 RX ORDER — SODIUM CHLORIDE 0.9 % (FLUSH) 0.9 %
10 SYRINGE (ML) INJECTION PRN
Status: DISCONTINUED | OUTPATIENT
Start: 2021-07-14 | End: 2021-07-16 | Stop reason: HOSPADM

## 2021-07-14 RX ORDER — BUMETANIDE 1 MG/1
1 TABLET ORAL DAILY
Status: DISCONTINUED | OUTPATIENT
Start: 2021-07-14 | End: 2021-07-16 | Stop reason: HOSPADM

## 2021-07-14 RX ORDER — ONDANSETRON 4 MG/1
4 TABLET, ORALLY DISINTEGRATING ORAL EVERY 8 HOURS PRN
Status: DISCONTINUED | OUTPATIENT
Start: 2021-07-14 | End: 2021-07-16 | Stop reason: HOSPADM

## 2021-07-14 RX ORDER — ASCORBIC ACID 500 MG
500 TABLET ORAL DAILY
Status: DISCONTINUED | OUTPATIENT
Start: 2021-07-14 | End: 2021-07-16 | Stop reason: HOSPADM

## 2021-07-14 RX ADMIN — AZITHROMYCIN MONOHYDRATE 500 MG: 500 INJECTION, POWDER, LYOPHILIZED, FOR SOLUTION INTRAVENOUS at 11:39

## 2021-07-14 RX ADMIN — SODIUM CHLORIDE 80 ML: 9 INJECTION, SOLUTION INTRAVENOUS at 09:55

## 2021-07-14 RX ADMIN — SODIUM CHLORIDE: 9 INJECTION, SOLUTION INTRAVENOUS at 21:41

## 2021-07-14 RX ADMIN — TAMSULOSIN HYDROCHLORIDE 0.4 MG: 0.4 CAPSULE ORAL at 18:03

## 2021-07-14 RX ADMIN — SODIUM CHLORIDE 500 ML: 9 INJECTION, SOLUTION INTRAVENOUS at 08:52

## 2021-07-14 RX ADMIN — BUMETANIDE 1 MG: 1 TABLET ORAL at 18:03

## 2021-07-14 RX ADMIN — PANTOPRAZOLE SODIUM 40 MG: 40 TABLET, DELAYED RELEASE ORAL at 16:51

## 2021-07-14 RX ADMIN — ENOXAPARIN SODIUM 40 MG: 40 INJECTION SUBCUTANEOUS at 21:40

## 2021-07-14 RX ADMIN — IOPAMIDOL 75 ML: 755 INJECTION, SOLUTION INTRAVENOUS at 09:55

## 2021-07-14 RX ADMIN — SODIUM CHLORIDE: 9 INJECTION, SOLUTION INTRAVENOUS at 16:53

## 2021-07-14 RX ADMIN — CEFTRIAXONE SODIUM 1000 MG: 1 INJECTION, POWDER, FOR SOLUTION INTRAMUSCULAR; INTRAVENOUS at 11:03

## 2021-07-14 RX ADMIN — IPRATROPIUM BROMIDE AND ALBUTEROL SULFATE 1 AMPULE: .5; 2.5 SOLUTION RESPIRATORY (INHALATION) at 17:43

## 2021-07-14 RX ADMIN — ONDANSETRON 4 MG: 2 INJECTION INTRAMUSCULAR; INTRAVENOUS at 08:52

## 2021-07-14 RX ADMIN — CETIRIZINE HYDROCHLORIDE 10 MG: 10 TABLET, FILM COATED ORAL at 16:52

## 2021-07-14 RX ADMIN — BUDESONIDE AND FORMOTEROL FUMARATE DIHYDRATE 2 PUFF: 160; 4.5 AEROSOL RESPIRATORY (INHALATION) at 17:45

## 2021-07-14 RX ADMIN — PANCRELIPASE 24000 UNITS: 24000; 76000; 120000 CAPSULE, DELAYED RELEASE PELLETS ORAL at 16:51

## 2021-07-14 RX ADMIN — GABAPENTIN 300 MG: 300 CAPSULE ORAL at 16:52

## 2021-07-14 RX ADMIN — SODIUM CHLORIDE, PRESERVATIVE FREE 10 ML: 5 INJECTION INTRAVENOUS at 09:54

## 2021-07-14 ASSESSMENT — ENCOUNTER SYMPTOMS
EYE REDNESS: 0
SINUS PAIN: 0
SINUS PRESSURE: 0
PHOTOPHOBIA: 0
SHORTNESS OF BREATH: 1
VOMITING: 1
COLOR CHANGE: 0
EYE DISCHARGE: 0
COUGH: 0
SORE THROAT: 0
ABDOMINAL PAIN: 1
RHINORRHEA: 0
COLOR CHANGE: 1
DIARRHEA: 0
NAUSEA: 1

## 2021-07-14 ASSESSMENT — PAIN SCALES - GENERAL
PAINLEVEL_OUTOF10: 7
PAINLEVEL_OUTOF10: 0

## 2021-07-14 NOTE — PROGRESS NOTES
Patient arrived from ED on day shift for possible aspiration pneumonia. GI consulted on day shift & patient is NPO for possible EGD tomorrow, but has PO meds scheduled for tonight. [employer] message sent to HORTENCIA Eid NP, and notified who instructed this writer to hold all PO meds tonight. Will monitor.

## 2021-07-14 NOTE — ED PROVIDER NOTES
EMERGENCY DEPARTMENT ENCOUNTER    Pt Name: Alix Kohler  MRN: [id number]  [place] 1945  Date of evaluation: 7/14/21  CHIEF COMPLAINT       Chief Complaint   Patient presents with    Fever    Emesis     HISTORY OF PRESENT ILLNESS   This is a 52-year-old male that presents with complaints of nausea, vomiting and fever. Patient began feeling ill last evening, he states that he had multiple episodes of emesis, 1 or 2 with a small amount of bleeding. He states that he has a fever, his family was concerned that he may have aspirated. Patient also has had dark urine, he has been unable to tolerate oral intake. Patient has a history of previous gastric bypass in the [de-identified]. He denies any stool changes. REVIEW OF SYSTEMS     Review of Systems   Constitutional: Negative for chills and fever. HENT: Negative for rhinorrhea and sore throat. Eyes: Negative for discharge, redness and visual disturbance. Respiratory: Positive for shortness of breath. Negative for cough. Cardiovascular: Negative for chest pain, palpitations and leg swelling. Gastrointestinal: Positive for abdominal pain, nausea and vomiting. Negative for diarrhea. Genitourinary: Negative for dysuria and hematuria. Musculoskeletal: Negative for arthralgias, myalgias and neck pain. Skin: Negative for color change and rash. Neurological: Negative for seizures, weakness and headaches. Psychiatric/Behavioral: Negative for hallucinations, self-injury and suicidal ideas.      PASTMEDICAL HISTORY     Past Medical History:   Diagnosis Date    Acute superficial gastritis without hemorrhage 05/26/2018    Anastomotic stricture of stomach     Asthma     Atrial fibrillation ([address]     On Xarelto 6/27/2020    Calculus of gallbladder without cholecystitis without obstruction 05/02/2017    Chronic diastolic heart failure ([address] 11/17/2017    Chronic rhinosinusitis 04/13/2015    Class 2 severe obesity due to excess calories with  BPH (benign prostatic hyperplasia) N40.0    Myalgia M79.10    Atrial fibrillation with slow ventricular response ([facility]) I48.91    Pacemaker pocket hematoma, initial encounter T82.837A    Cellulitis of chest wall L03.313    Pneumonia-community-acquired J18.9    Acute hypoxemic respiratory failure (HCC) J96.01    SIRS (systemic inflammatory response syndrome) ([facility]) R65.10    Pulmonary hypertension ([facility]) I27.20    Presence of permanent cardiac pacemaker Z95.0    Community acquired pneumonia, bilateral J18.9     SURGICAL HISTORY       Past Surgical History:   Procedure Laterality Date    CARPAL TUNNEL RELEASE      bilateral    COLONOSCOPY      COLONOSCOPY N/A 04/05/2021    COLONOSCOPY DIAGNOSTIC performed by Reji Saenz MD at [address]  01/02/2019    by Dr. Elena Meyers N/A 01/02/2019    CYSTOSCOPY performed by Nato Lozano MD at [address]    first knuckle right index finger   [address]    vertical banded gastroplasty   [address] REPLACEMENT  2004 & 2005    bilateral knees    PACEMAKER INSERTION  04/09/2021    [place], placed by Dr. Tracy Favorite EGD [address] N/A 08/16/2018    EGD FOREIGN BODY REMOVAL performed by Karime Borjas MD at [address] EGD TRANSORAL BIOPSY SINGLE/MULTIPLE N/A 05/25/2018    EGD BIOPSY performed by Frank Rivera DO at [address] [facility]      left shoulder    UPPER GASTROINTESTINAL ENDOSCOPY  08/13/2018    removal of food bolus    UPPER GASTROINTESTINAL ENDOSCOPY N/A 08/13/2018    EGD FOREIGN BODY REMOVAL performed by Frank Rivera DO at [facility] 08/13/2018    EGD BIOPSY performed by Frank Rivera DO at [address] [place] [address]  08/16/2018    egd with balloon dilitation    UPPER GASTROINTESTINAL ENDOSCOPY  08/16/2018    EGD DILATION BALLOON performed by Kaye Pedraza MD at [address] [facility] 10/01/2018    EGD BIOPSY performed by Marely Preciado MD at [address] [facility] ENDOSCOPY  10/01/2018    EGD DILATION BALLOON performed by Marely Preciado MD at [address] [facility] 11/19/2018    EGD DILATION BALLOON performed by Marely Preciado MD at [address] [facility] 10/15/2020    EGD SUBMUCOSAL/BOTOX INJECTION tattoo  performed by Waldron Merlin, MD at [place] [address]       Previous Medications    ALBUTEROL SULFATE HFA (VENTOLIN HFA) 108 (90 BASE) MCG/ACT INHALER    Inhale 2 puffs into the lungs every 6 hours as needed for Wheezing or Shortness of Breath    ARMODAFINIL 200 MG TABS    Take 1 tablet by mouth 2 times daily.      ASCORBIC ACID (VITAMIN C) 500 MG TABLET    Take 1 tablet by mouth daily    BENZONATATE (TESSALON) 100 MG CAPSULE    TAKE 1 CAPSULE BY MOUTH THREE TIMES DAILY AS NEEDED FOR COUGH    BUMETANIDE (BUMEX) 1 MG TABLET    Take 1 mg by mouth daily Take 2 mg of Bumex in morning, 1 mg of Bumex in afternoon, take 1 mg in evening if weight is >3 above baseline weight    CARBOXYMETHYLCELLULOSE (REFRESH PLUS) 0.5 % SOLN OPHTHALMIC SOLUTION    Apply 1 drop to eye 4 times daily    CETIRIZINE (ZYRTEC) 10 MG TABLET    Take 1 tablet by mouth daily    DOCUSATE SODIUM (COLACE) 100 MG CAPSULE    Take 1 capsule by mouth 2 times daily as needed for Constipation    FERROUS SULFATE (FE TABS 325) 325 (65 FE) MG EC TABLET    Take 1 tablet by mouth 2 times daily (with meals)    FLUTICASONE (FLONASE) 50 MCG/ACT NASAL SPRAY    2 sprays by Nasal route daily    FLUTICASONE FUROATE-VILANTEROL (BREO ELLIPTA) 200-25 MCG/INH AEPB INHALER    Inhale 1 puff into the lungs daily    FOOT CARE PRODUCTS (TRI-BALANCE ORTHOTICS MENS) MISC    Provide insurance covered orthotics shoes, wear daily    GABAPENTIN (NEURONTIN) 300 MG CAPSULE    TAKE 1 CAPSULE BY MOUTH THREE TIMES DAILY    HANDICAP PLACARD MISC    by Does not apply route    INCONTINENCE SUPPLY DISPOSABLE (INCONTINENCE BRIEF LARGE) MISC    To use as directed. Use 3x a day    IPRATROPIUM-ALBUTEROL (DUONEB) 0.5-2.5 (3) MG/3ML SOLN NEBULIZER SOLUTION    Inhale 1 vial into the lungs every 4 hours as needed     LIPASE-PROTEASE-AMYLASE (CREON) 73617-46095 UNITS DELAYED RELEASE CAPSULE    TAKE 1 CAPSULE BY MOUTH THREE TIMES DAILY WITH MEALS    LORATADINE (CLARITIN) 10 MG TABLET    Take 1 tablet by mouth daily    MENTHOL-METHYL SALICYLATE (THERA-GESIC) 0.5-15 % CREA    Apply topically 2 times daily as needed    METOPROLOL SUCCINATE (TOPROL XL) 50 MG EXTENDED RELEASE TABLET    Take 1 tablet by mouth nightly    MULTIPLE VITAMIN (MULTI-VITAMINS) TABS    Take 1 tablet by mouth daily    PANTOPRAZOLE (PROTONIX) 40 MG TABLET    TAKE 1 TABLET BY MOUTH DAILY    POTASSIUM CHLORIDE (KLOR-CON M) 20 MEQ EXTENDED RELEASE TABLET    Take 1 tablet by mouth 2 times daily    ROPINIROLE (REQUIP) 0.25 MG TABLET    TAKE 1 TABLET BY MOUTH TWICE DAILY    SM LUBRICANT EYE DROPS 0.4-0.3 % OPHTHALMIC SOLUTION    PLACE 1 DROP INTO BOTH EYES FOUR TIMES DAILY AS NEEDED FOR DRY EYES    TAMSULOSIN (FLOMAX) 0.4 MG CAPSULE    Take 1 capsule by mouth daily    TIZANIDINE (ZANAFLEX) 4 MG TABLET    TAKE 1 TABLET BY MOUTH DAILY AS NEEDED    VITAMIN B-12 (CYANOCOBALAMIN) 100 MCG TABLET    TAKE 1 TABLET BY MOUTH DAILY AS NEEDED FOR FATIGUE     ALLERGIES     is allergic to darvocet [propoxyphene n-acetaminophen], other, oxycodone-acetaminophen, and propoxyphene. FAMILY HISTORY     He indicated that his mother is . He indicated that his father is .      SOCIAL HISTORY       Social History     Tobacco Use    Smoking status: Former Smoker     Packs/day: 1.00     Years: 20.00     Pack years: 20.00     Quit date: 1976     Years since quittin.6    Smokeless tobacco: Never Used   Vaping Use    Vaping Use: Never used   Substance Use Topics    Alcohol use: No    Drug use: No     PHYSICAL EXAM     INITIAL VITALS: /65   Pulse 108   Temp 100.2 °F (37.9 °C) (Oral)   Resp 18   Ht 5' 10\" (1.778 m)   Wt 192 lb (87.1 kg)   SpO2 98%   BMI 27.55 kg/m²    Physical Exam  Constitutional:       Appearance: Normal appearance. He is well-developed. He is not ill-appearing or toxic-appearing. HENT:      Head: Normocephalic and atraumatic. Eyes:      Conjunctiva/sclera: Conjunctivae normal.      Pupils: Pupils are equal, round, and reactive to light. Neck:      Trachea: Trachea normal.   Cardiovascular:      Rate and Rhythm: Tachycardia present. Rhythm irregularly irregular. Heart sounds: S1 normal and S2 normal. No murmur heard. Pulmonary:      Effort: Pulmonary effort is normal. No accessory muscle usage or respiratory distress. Breath sounds: Normal breath sounds. Chest:      Chest wall: No deformity or tenderness. Abdominal:      General: Bowel sounds are normal. There is no distension or abdominal bruit. Palpations: Abdomen is not rigid. Tenderness: There is abdominal tenderness in the epigastric area. There is no guarding or rebound. Negative signs include Diaz's sign and McBurney's sign. Musculoskeletal:      Cervical back: Normal range of motion and neck supple. Skin:     General: Skin is warm. Findings: No rash. Neurological:      Mental Status: He is alert and oriented to person, place, and time. GCS: GCS eye subscore is 4. GCS verbal subscore is 5. GCS motor subscore is 6. Psychiatric:         Speech: Speech normal.         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 Plan is basic labs, blood cultures lactic acid chest x-ray CT of the abdomen and pelvis and urinalysis. 11:55 AM EDT  Patient's laboratory studies show evidence of mild hypomagnesemia, his troponins are very mildly elevated, this could be demand ischemia.   Chest components:    RBC 3.96 (*)     Hemoglobin 12.8 (*)     Hematocrit 39.9 (*)     RDW 15.0 (*)     Seg Neutrophils 88 (*)     Lymphocytes 4 (*)     Immature Granulocytes 1 (*)     Segs Absolute 7.74 (*)     Absolute Lymph # 0.35 (*)     All other components within normal limits   MAGNESIUM - Abnormal; Notable for the following components:    Magnesium 1.5 (*)     All other components within normal limits   HEPATIC FUNCTION PANEL - Abnormal; Notable for the following components:    Bilirubin, Direct 0.51 (*)     All other components within normal limits   LIPASE - Abnormal; Notable for the following components:    Lipase 8 (*)     All other components within normal limits   TROPONIN - Abnormal; Notable for the following components:    Troponin, High Sensitivity 52 (*)     All other components within normal limits   TROPONIN - Abnormal; Notable for the following components:    Troponin, High Sensitivity 58 (*)     All other components within normal limits   COVID-19, RAPID   CULTURE, BLOOD 1   CULTURE, BLOOD 1   LACTIC ACID   LACTIC ACID   URINALYSIS       EMERGENCY DEPARTMENTCOURSE:         Vitals:    Vitals:    07/14/21 0822   BP: 100/65   Pulse: 108   Resp: 18   Temp: 100.2 °F (37.9 °C)   TempSrc: Oral   SpO2: 98%   Weight: 192 lb (87.1 kg)   Height: 5' 10\" (1.778 m)       The patient was given the following medications while in the emergency department:  Orders Placed This Encounter   Medications    0.9 % sodium chloride bolus    ondansetron (ZOFRAN) injection 4 mg    iopamidol (ISOVUE-370) 76 % injection 75 mL    sodium chloride flush 0.9 % injection 10 mL    0.9 % sodium chloride bolus    cefTRIAXone (ROCEPHIN) 1000 mg IVPB in 50 mL D5W minibag     Order Specific Question:   Antimicrobial Indications     Answer:   Pneumonia (CAP)    azithromycin (ZITHROMAX) 500 mg in D5W 250ml addavial     Order Specific Question:   Antimicrobial Indications     Answer:   Skin and Soft Tissue Infection     CONSULTS:  IP CONSULT TO HOSPITALIST    FINAL IMPRESSION      1. Pneumonia of both lungs due to infectious organism, unspecified part of lung    2. Non-intractable vomiting with nausea, unspecified vomiting type    3. Epigastric pain          DISPOSITION/PLAN   DISPOSITION Admitted 07/14/2021 11:48:09 AM      PATIENT REFERRED TO:  No follow-up provider specified.   DISCHARGE MEDICATIONS:  New Prescriptions    No medications on file     Jeramie Jackson MD  Attending Emergency Physician                   Jeramie Jackson MD  07/14/21 [id number]

## 2021-07-14 NOTE — H&P
[facility]  Office: [address], DO, Shahnaz Mott, DO, Sd Chaparro, DO, Juan Pablo Shen [facility], DO, Eileen Haque MD, Sharon Turcios MD, Lazaro Germain MD, Ninfa Martinez MD, Julianne Calvillo MD, Heidi Romero MD, Melissa Riggs MD, Lauren Taylor DO, Nam Treadwell MD, Driss Cadet DO, Marlen Saldana MD,  Nathanial Apgar, DO, Gina Grullon MD, Jeff Calix MD, Tatyana Gaines MD, Chidi Casas MD, Tate Balderas MD, Tempie Mcburney, MD, Mallika Arechiga [facility], [facility], CNP, Parisa Shultz, CNP, Carline Stewart, CNS, Yaneth Fret, CNP, Simon Blackmon, CNP, Michael Haque, CNP, Aubrey Mcdonough, CNP, Minoo Barnhart, CNP, Carlitos Wright PA-C, Jitendra Gibson, [facility], Summer Caballero, CNP, Chris Riggs, CNP, Osmin Tejada, CNP, Sima Drake, CNP, Isadora Odell, CNP, Russell Pena, CNP, Manjula Meek, CNP, Raimundo Sandhu, [facility]    HISTORY AND PHYSICAL EXAMINATION            Date:   2021  Patient name:  Ronald Santiago  Date of admission:  2021  8:16 AM  MRN:   [id number]  Account:  [de-identified]  YOB: 1945  PCP:    JULIAN Damon CNP  Room:   [place] [address]  Code Status:    Prior    Chief Complaint:     Chief Complaint   Patient presents with    Fever    Emesis       History Obtained From:     patient    History of Present Illness:     Ronald Santiago is a 77 y.o. Non-/non  male who presents with Fever and Emesis   and is admitted to the hospital for the management of Community acquired pneumonia, bilateral.    Patient reports to the emergency department with a 1 day history of general malaise with nausea and emesis. Patient has a history of gastric bypass greater than 20 years ago and has had aspiration pneumonia in the past secondary to esophageal stricture. Accompanying family is concerned with his presentation that he may have aspirated and may be developing pneumonia.   Emergency department evaluation is completed and chest CT does reveal multifocal bilateral pneumonia with infiltrates in the bilateral bases and right upper lobe. Covid swab negative. Most likely aspiration pneumonia. Patient reports that he has not had a swallow study in some time. Patient was treated with antiemetics and he did report that this did not alleviate his vomiting however he is still nauseated. Patient reports that he was attempting to make an appointment with his gastroenterologist for routine evaluation of his esophageal stricture. Depending upon evaluation during the hospital course he may require GI evaluation as an inpatient.      Past Medical History:     Past Medical History:   Diagnosis Date    Acute superficial gastritis without hemorrhage 05/26/2018    Anastomotic stricture of stomach     Asthma     Atrial fibrillation ([facility]     On Xarelto 6/27/2020    Calculus of gallbladder without cholecystitis without obstruction 05/02/2017    Chronic diastolic heart failure ([facility] 11/17/2017    Chronic rhinosinusitis 04/13/2015    Class 2 severe obesity due to excess calories with serious comorbidity and body mass index (BMI) of 36.0 to 36.9 in adult [facility]) 11/17/2017    COPD (chronic obstructive pulmonary disease) ([facility]     E. coli septicemia (HCC)     E. coli UTI     Foot drop, right 07/10/2012    Fracture of femur ([facility] 10/23/2016    Gait disorder 07/20/2016    Gastric out let obstruction     GERD (gastroesophageal reflux disease)     Hallux valgus, acquired, bilateral 06/24/2015    Hyperlipidemia     Hypertension     Impaired hearing 04/13/2015    Internal hemorrhoids 01/03/2017    Lymphedema of both lower extremities 06/24/2015    Nausea & vomiting 11/16/2018    OA (osteoarthritis) of knee, bilateral 12/11/2006    Obesity     MARIAMA on CPAP 05/02/2017    Osteoarthritis     Osteoarthritis of lumbar spine 12/13/2007     replace inactive diagnosis    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 coli ([facility]     Due to UTI     Sick sinus syndrome (HCC)     Simple chronic bronchitis (HCC) 04/10/2018    Slow transit constipation 11/03/2016    Unspecified sleep apnea     UTI (urinary tract infection)         Past Surgical History:     Past Surgical History:   Procedure Laterality Date    CARPAL TUNNEL RELEASE      bilateral    COLONOSCOPY      COLONOSCOPY N/A 04/05/2021    COLONOSCOPY DIAGNOSTIC performed by Stevo Wahl MD at [address]  01/02/2019    by Dr. Saint Grills N/A 01/02/2019    CYSTOSCOPY performed by Beth Brody MD at [address]    first knuckle right index finger   [address] [facility] [address]    vertical banded gastroplasty   [address] REPLACEMENT  2004 & 2005    bilateral knees    PACEMAKER INSERTION  04/09/2021    [place], placed by Dr. Kvng Yoder EGD [address] N/A 08/16/2018    EGD FOREIGN BODY REMOVAL performed by Fredis Prabhakar MD at [address] EGD TRANSORAL BIOPSY SINGLE/MULTIPLE N/A 05/25/2018    EGD BIOPSY performed by Luz Maria Hampton DO at [address] [facility]      left shoulder    UPPER GASTROINTESTINAL ENDOSCOPY  08/13/2018    removal of food bolus    UPPER GASTROINTESTINAL ENDOSCOPY N/A 08/13/2018    EGD FOREIGN BODY REMOVAL performed by Luz Maria Hampton DO at [address] [facility] 08/13/2018    EGD BIOPSY performed by Luz Maria Hampton DO at [address] [facility]  08/16/2018    egd with balloon dilitation    UPPER GASTROINTESTINAL ENDOSCOPY  08/16/2018    EGD DILATION BALLOON performed by Fredis Prabhakar MD at [address] [facility] 10/01/2018    EGD BIOPSY performed by Stevo Wahl MD at [address] [facility]  10/01/2018    EGD DILATION BALLOON performed by Stevo Wahl MD at [facility]  UPPER GASTROINTESTINAL ENDOSCOPY N/A 11/19/2018    EGD DILATION BALLOON performed by Dana Rand MD at [address] [facility] N/A 10/15/2020    EGD SUBMUCOSAL/BOTOX INJECTION tattoo  performed by Jerrod Guan MD at [facility]        Medications Prior to Admission:     Prior to Admission medications    Medication Sig Start Date End Date Taking?  Authorizing Provider   lipase-protease-amylase (CREON) 37448-20826 units delayed release capsule TAKE 1 CAPSULE BY MOUTH THREE TIMES DAILY WITH MEALS 6/30/21   Patricia Escalante, APRN - CNP   metoprolol succinate (TOPROL XL) 50 MG extended release tablet Take 1 tablet by mouth nightly 6/30/21   Patricia Escalante, APRN - CNP   Multiple Vitamin (MULTI-VITAMINS) TABS Take 1 tablet by mouth daily 6/30/21   Patricia Escalante, APRN - CNP   gabapentin (NEURONTIN) 300 MG capsule TAKE 1 CAPSULE BY MOUTH THREE TIMES DAILY 6/30/21 7/27/21  Patricia Escalante, APRN - CNP   potassium chloride (KLOR-CON M) 20 MEQ extended release tablet Take 1 tablet by mouth 2 times daily 6/30/21   Patricia Escalante, APRN - CNP   rOPINIRole (REQUIP) 0.25 MG tablet TAKE 1 TABLET BY MOUTH TWICE DAILY 6/30/21   Patricia Escalante, APRN - CNP   cetirizine (ZYRTEC) 10 MG tablet Take 1 tablet by mouth daily 6/2/21   Patricia Escalante APRN - CNP   docusate sodium (COLACE) 100 MG capsule Take 1 capsule by mouth 2 times daily as needed for Constipation 5/13/21   Patricia Escalante APRN - CNP   tiZANidine (ZANAFLEX) 4 MG tablet TAKE 1 TABLET BY MOUTH DAILY AS NEEDED 4/30/21   Patricia Escalante, APRN - CNP   loratadine (CLARITIN) 10 MG tablet Take 1 tablet by mouth daily 4/27/21   Patricia Escalante, APRN - CNP   bumetanide (BUMEX) 1 MG tablet Take 1 mg by mouth daily Take 2 mg of Bumex in morning, 1 mg of Bumex in afternoon, take 1 mg in evening if weight is >3 above baseline weight    Historical Provider, MD   albuterol sulfate HFA (VENTOLIN HFA) 108 (90 Base) MCG/ACT inhaler Inhale 2 puffs into the lungs every 6 hours as needed for Wheezing or Shortness of Breath    Historical Provider, MD   benzonatate (TESSALON) 100 MG capsule TAKE 1 CAPSULE BY MOUTH THREE TIMES DAILY AS NEEDED FOR COUGH 3/31/21   Lauren Dyer PA-C   vitamin B-12 (CYANOCOBALAMIN) 100 MCG tablet TAKE 1 TABLET BY MOUTH DAILY AS NEEDED FOR FATIGUE 3/27/21   BOZENA Mcnair-ERASTO   tamsulosin [facility]) 0.4 MG capsule Take 1 capsule by mouth daily 3/12/21   Lauren Dyer PA-C   pantoprazole (PROTONIX) 40 MG tablet TAKE 1 TABLET BY MOUTH DAILY 2/3/21   Lauren Dyer PA-C   carboxymethylcellulose (REFRESH PLUS) 0.5 % SOLN ophthalmic solution Apply 1 drop to eye 4 times daily    Historical Provider, MD   Menthol-Methyl Salicylate ([address] [employer] [facility]) 0.5-15 % CREA Apply topically 2 times daily as needed 6/26/20   Historical Provider, MD   fluticasone (FLONASE) 50 MCG/ACT nasal spray 2 sprays by Nasal route daily 11/5/20   Lauren Dyer PA-C   ascorbic acid (VITAMIN C) 500 MG tablet Take 1 tablet by mouth daily 10/26/20   Lauren Dyer PA-C   ferrous sulfate (FE TABS 325) 325 (65 Fe) MG EC tablet Take 1 tablet by mouth 2 times daily (with meals) 10/16/20   Wilson Casey PA-C   SM LUBRICANT EYE DROPS 0.4-0.3 % ophthalmic solution PLACE 1 DROP INTO BOTH EYES FOUR TIMES DAILY AS NEEDED FOR DRY EYES 10/17/19   Lauren Dyer PA-C   Fluticasone furoate-vilanterol (BREO ELLIPTA) 200-25 MCG/INH AEPB inhaler Inhale 1 puff into the lungs daily    Historical Provider, MD   Handicap Placard MISC by Does not apply route 6/27/19   Lauren Dyer PA-C   Incontinence Supply Disposable (INCONTINENCE BRIEF LARGE) MISC To use as directed.  Use 3x a day 4/30/19   Lauren Dyer PA-C   Foot Care Products (TRI-BALANCE ORTHOTICS MENS) MISC Provide insurance covered orthotics shoes, wear daily 12/12/18   Lauren Dyer PA-C   ipratropium-albuterol (DUONEB) 0.5-2.5 (3) MG/3ML SOLN nebulizer solution Inhale 1 vial into the lungs every 4 hours as needed     Historical Provider, MD   Armodafinil 200 MG TABS Take 1 tablet by mouth 2 times daily. 18   Historical Provider, MD        Allergies:     Darvocet [propoxyphene n-acetaminophen], Other, Oxycodone-acetaminophen, and Propoxyphene    Social History:     Tobacco:    reports that he quit smoking about 44 years ago. He has a 20.00 pack-year smoking history. He has never used smokeless tobacco.  Alcohol:      reports no history of alcohol use. Drug Use:  reports no history of drug use. Family History:     Family History   Problem Relation Age of Onset    Cancer Mother     Heart Attack Father        Review of Systems:     Positive and Negative as described in HPI. Review of Systems   Constitutional: Positive for activity change and fatigue. HENT: Negative for sinus pressure and sinus pain. Eyes: Negative for photophobia and visual disturbance. Respiratory: Positive for shortness of breath. Gastrointestinal: Positive for abdominal pain and nausea. Endocrine: Negative for polyphagia and polyuria. Genitourinary: Positive for decreased urine volume. Musculoskeletal: Positive for arthralgias. Negative for myalgias. Skin: Positive for color change and pallor. Allergic/Immunologic: Negative for environmental allergies and immunocompromised state. Neurological: Positive for weakness (Generalized). Negative for dizziness and tremors. Hematological: Negative for adenopathy. Does not bruise/bleed easily. Psychiatric/Behavioral: Negative for agitation, confusion, self-injury and suicidal ideas. Physical Exam:   /65   Pulse 108   Temp 100.2 °F (37.9 °C) (Oral)   Resp 18   Ht 5' 10\" (1.778 m)   Wt 192 lb (87.1 kg)   SpO2 98%   BMI 27.55 kg/m²   Temp (24hrs), Av.2 °F (37.9 °C), Min:100.2 °F (37.9 °C), Max:100.2 °F (37.9 °C)    No results for input(s): POCGLU in the last 72 hours.   No intake or output data in the 24 hours ending 21 1341    Physical Exam  Constitutional:       General: He is not in acute distress. Appearance: Normal appearance. He is normal weight. He is not ill-appearing. HENT:      Head: Normocephalic and atraumatic. Mouth/Throat:      Mouth: Mucous membranes are moist.   Cardiovascular:      Rate and Rhythm: Normal rate and regular rhythm. Pulses: Normal pulses. Heart sounds: No murmur heard. Pulmonary:      Effort: Respiratory distress (mild) present. Breath sounds: Examination of the right-lower field reveals decreased breath sounds. Examination of the left-lower field reveals decreased breath sounds. Decreased breath sounds present. Abdominal:      General: Abdomen is flat. Bowel sounds are normal.      Palpations: Abdomen is soft. Musculoskeletal:         General: No swelling, tenderness or deformity. Normal range of motion. Cervical back: Normal range of motion and neck supple. No rigidity. Skin:     General: Skin is warm and dry. Capillary Refill: Capillary refill takes less than 2 seconds. Coloration: Skin is not jaundiced. Findings: No bruising. Neurological:      General: No focal deficit present. Mental Status: He is alert and oriented to person, place, and time.          Investigations:      Laboratory Testing:  Recent Results (from the past 24 hour(s))   EKG 12 Lead    Collection Time: 07/14/21  8:41 AM   Result Value Ref Range    Ventricular Rate 71 BPM    Atrial Rate 70 BPM    QRS Duration 180 ms    Q-T Interval 492 ms    QTc Calculation (Bazett) 534 ms    R Axis -88 degrees    T Axis 76 degrees   Basic Metabolic Panel    Collection Time: 07/14/21  8:52 AM   Result Value Ref Range    Glucose 122 (H) 70 - 99 mg/dL    BUN 12 8 - 23 mg/dL    CREATININE 0.88 0.70 - 1.20 mg/dL    Bun/Cre Ratio 14 9 - 20    Calcium 9.3 8.6 - 10.4 mg/dL    Sodium 139 135 - 144 mmol/L    Potassium 3.8 3.7 - 5.3 mmol/L    Chloride 99 98 - 107 mmol/L    CO2 29 20 - 31 mmol/L Anion Gap 11 9 - 17 mmol/L    GFR Non-African American >60 >60 mL/min    GFR African American >60 >60 mL/min    GFR Comment          GFR Staging NOT REPORTED    CBC Auto Differential    Collection Time: 07/14/21  8:52 AM   Result Value Ref Range    WBC 8.8 3.5 - 11.3 k/uL    RBC 3.96 (L) 4.21 - 5.77 m/uL    Hemoglobin 12.8 (L) 13.0 - 17.0 g/dL    Hematocrit 39.9 (L) 40.7 - 50.3 %    .8 82.6 - 102.9 fL    MCH 32.3 25.2 - 33.5 pg    MCHC 32.1 28.4 - 34.8 g/dL    RDW 15.0 (H) 11.8 - 14.4 %    Platelets 878 110 - 144 k/uL    MPV 8.6 8.1 - 13.5 fL    NRBC Automated 0.0 0.0 per 100 WBC    Differential Type NOT REPORTED     WBC Morphology NOT REPORTED     RBC Morphology NOT REPORTED     Platelet Estimate NOT REPORTED     Seg Neutrophils 88 (H) 36 - 66 %    Lymphocytes 4 (L) 24 - 44 %    Monocytes 6 1 - 7 %    Eosinophils % 1 1 - 4 %    Basophils 0 %    Immature Granulocytes 1 (H) 0 %    Segs Absolute 7.74 (H) 1.8 - 7.7 k/uL    Absolute Lymph # 0.35 (L) 1.0 - 4.8 k/uL    Absolute Mono # 0.53 0.2 - 0.8 k/uL    Absolute Eos # 0.09 0.0 - 0.4 k/uL    Basophils Absolute 0.00 0.0 - 0.2 k/uL    Absolute Immature Granulocyte 0.09 0.00 - 0.30 k/uL   Magnesium    Collection Time: 07/14/21  8:52 AM   Result Value Ref Range    Magnesium 1.5 (L) 1.6 - 2.6 mg/dL   Hepatic Function Panel    Collection Time: 07/14/21  8:52 AM   Result Value Ref Range    Albumin 3.9 3.5 - 5.2 g/dL    Alkaline Phosphatase 95 40 - 129 U/L    ALT 8 5 - 41 U/L    AST 23 <40 U/L    Total Bilirubin 1.20 0.3 - 1.2 mg/dL    Bilirubin, Direct 0.51 (H) <0.31 mg/dL    Bilirubin, Indirect 0.69 0.00 - 1.00 mg/dL    Total Protein 6.9 6.4 - 8.3 g/dL    Globulin NOT REPORTED 1.5 - 3.8 g/dL    Albumin/Globulin Ratio NOT REPORTED 1.0 - 2.5   Lipase    Collection Time: 07/14/21  8:52 AM   Result Value Ref Range    Lipase 8 (L) 13 - 60 U/L   Troponin    Collection Time: 07/14/21  8:52 AM   Result Value Ref Range    Troponin, High Sensitivity 52 (HH) 0 - 22 ng/L Troponin T NOT REPORTED <0.03 ng/mL    Troponin Interp NOT REPORTED    Lactic Acid    Collection Time: 07/14/21  8:52 AM   Result Value Ref Range    Lactic Acid 1.8 0.5 - 2.2 mmol/L   Troponin    Collection Time: 07/14/21 10:48 AM   Result Value Ref Range    Troponin, High Sensitivity 58 (HH) 0 - 22 ng/L    Troponin T NOT REPORTED <0.03 ng/mL    Troponin Interp NOT REPORTED    Lactic Acid    Collection Time: 07/14/21 10:48 AM   Result Value Ref Range    Lactic Acid 1.4 0.5 - 2.2 mmol/L   Urinalysis    Collection Time: 07/14/21 11:05 AM   Result Value Ref Range    Color, UA YELLOW YELLOW    Turbidity UA CLEAR CLEAR    Glucose, Ur NEGATIVE NEGATIVE    Bilirubin Urine NEGATIVE NEGATIVE    Ketones, Urine NEGATIVE NEGATIVE    Specific Gravity, UA 1.010 1.005 - 1.030    Urine Hgb NEGATIVE NEGATIVE    pH, UA 5.5 5.0 - 8.0    Protein, UA NEGATIVE NEGATIVE    Urobilinogen, Urine Normal Normal    Nitrite, Urine NEGATIVE NEGATIVE    Leukocyte Esterase, Urine NEGATIVE NEGATIVE    Urinalysis Comments       Microscopic exam not performed based on chemical results unless requested in original order. COVID-19, Rapid    Collection Time: 07/14/21 11:05 AM    Specimen: Nasopharyngeal Swab   Result Value Ref Range    Specimen Description . NASOPHARYNGEAL SWAB     SARS-CoV-2, Rapid Not Detected Not Detected       Imaging/Diagnostics:  CT CHEST WO CONTRAST    Result Date: 7/14/2021  1. Bilateral lower lobe and right upper lobe ground-glass infiltrates. This has the appearance of an atypical pneumonia, including viral pneumonia. Follow-up to resolution is recommended. 2. Cardiomegaly with trace pericardial effusion. 3. No acute intra-abdominal or pelvic abnormality. 4. Cholelithiasis. CT ABDOMEN PELVIS W IV CONTRAST Additional Contrast? None    Result Date: 7/14/2021  1. Bilateral lower lobe and right upper lobe ground-glass infiltrates. This has the appearance of an atypical pneumonia, including viral pneumonia.  Follow-up to resolution is recommended. 2. Cardiomegaly with trace pericardial effusion. 3. No acute intra-abdominal or pelvic abnormality. 4. Cholelithiasis. XR CHEST PORTABLE    Result Date: 7/14/2021  No acute cardiopulmonary process. Assessment :      Hospital Problems         Last Modified POA    * (Principal) Community acquired pneumonia, bilateral 7/14/2021 Yes    Chronic atrial fibrillation ([facility] 7/14/2021 Yes    Dyslipidemia 7/14/2021 Yes    Gastroesophageal reflux disease without esophagitis 7/14/2021 Yes    Essential hypertension 7/14/2021 Yes    COPD (chronic obstructive pulmonary disease) ([facility] [address] 7/14/2021 Yes    Chronic diastolic heart failure ([facility] [address] [address] 7/14/2021 Yes          Plan:     Patient status inpatient in the  Med/Surge    1. Bilateral community-acquired pneumonia, likely aspiration secondary to GERD and a personal history of gastric bypass with esophageal narrowing  1. Rocephin and Zithromax  2. Swallow study  3. PPI  4. Continue Creon  5. Scheduled and as needed breathing treatments  6. Supplemental oxygen to maintain sats. The 93%, encourage cough deep breathing exercise, incentive spirometry  7. Inpatient versus outpatient evaluation by GI depending upon swallow study results  2. Chronic A. Fib with chronic diastolic heart failure  1. Currently not on anticoagulation  2. Continue Bumex  3. Continue beta-blocker  3. Essential hypertension with hyperlipidemia  1. Continue home medication as described above  4. COPD  1. Continue home medication regiment including Breo, DuoNeb, albuterol for acute respiratory distress      Consultations:   IP CONSULT TO HOSPITALIST    Patient is admitted as inpatient status because of co-morbidities listed above, severity of signs and symptoms as outlined, requirement for current medical therapies and most importantly because of direct risk to patient if care not provided in a hospital setting. Expected length of stay > 48 hours.     JULIAN Tamez - NP  7/14/2021  1:41 PM    Copy sent to Dr. Rachel Baez, APRN - CNP

## 2021-07-14 NOTE — PROGRESS NOTES
Patient admitted to room 1008 from ED via [facility]. VS taken, telemetry placed and call light given/within reach. Patient alert and given room orientation. Orders released/reviewed, initial assessment completed and navigator started. See chart for more detail. Writer spoke with patient's spouse, all questions answered at this time. Spouse stated she will be in tomorrow morning to visit.

## 2021-07-14 NOTE — RT PROTOCOL NOTE
RT Inhaler-Nebulizer Bronchodilator Protocol Note    There is a bronchodilator order in the chart from a provider indicating to follow the RT Bronchodilator Protocol and there is an Initiate RT Bronchodilator Protocol order as well (see protocol at bottom of note). The findings from the last RT Protocol Assessment were as follows:  Smoking: <15 Pack years  Surgical Status: No surgery  Xray: Multiple lobe infiltrates/atelectasis/pleural effusion/edema  Respiratory Pattern: Dyspnea with exertion  Mental Status: Alert and Oriented  Breath Sounds: Diminished and/or crackles  Cough: Strong, productive  Activity Level: Walking with assistance  Oxygen Requirement: Room Air - 2LNC/28% or home setting  Indication for Bronchodilator Therapy: Decreased or absent breath sounds  Bronchodilator Assessment Score: 8    Aerosolized bronchodilator medication orders have been revised according to the RT Bronchodilator Protocol. RT Inhaler-Nebulizer Bronchodilator Protocol:    Respiratory Therapist to perform RT Therapy Protocol Assessment then follow the protocol. No Indications - adjust the frequency to every 6 hours PRN wheezing or bronchospasm, if no treatments needed after 48 hours then discontinue using Per Protocol order mode. If indication present, adjust the RT bronchodilator orders based on the Bronchodilator Assessment Score as follows:    0-6 - enter or revise RT bronchodilator order to Albuterol Inhaler order with frequency of every 2 hours PRN for wheezing or increased work of breathing using Per Protocol order mode. If Albuterol Inhaler not tolerated or not effective, then discontinue the Albuterol Inhaler order and enter Albuterol Nebulizer order with same frequency and PRN reasons. Repeat RT Therapy Protocol Assessment as needed.     7-10 - discontinue any other Inpatient aerosolized bronchodilator medication orders and enter or revise two Albuterol Inhaler orders, one with BID frequency and one with frequency of every 2 hours PRN wheezing or increased work of breathing using Per Protocol order mode. Repeat RT Therapy Protocol Assessment with second treatment then BID and as needed. If Albuterol Inhaler not tolerated or not effective, then discontinue the Albuterol Inhaler orders and enter two Albuterol Nebulizer orders with same frequencies and PRN reasons. 11-13 - discontinue any other Inpatient aerosolized bronchodilator medication orders and enter DuoNeb Nebulizer orders QID frequency and an Albuterol Nebulizer order every 2 hours PRN wheezing or increased work of breathing using Per Protocol order mode. Repeat RT Therapy Protocol Assessment with second treatment then QID and as needed. Greater than 13 - discontinue any other Inpatient bronchodilator aerosolized medication orders and enter DuoNeb Nebulizer order every 4 hours frequency and Albuterol Nebulizer every 2 hours PRN wheezing or increased work of breathing using Per Protocol order mode. Repeat RT Therapy Protocol Assessment with second treatment then every 4 hours and as needed. RT to enter RT Home Evaluation for COPD & MDI Assessment order using Per Protocol order mode.     Electronically signed by Ravindra Ashley RCP on 7/14/2021 at 5:40 PM

## 2021-07-14 NOTE — CARE COORDINATION
Case Management Initial Discharge Plan  Fifi Sadie,         Readmission Risk              Risk of Unplanned Readmission:  19             Met with:patient or spouse/SO to discuss discharge plans. Information verified: address, contacts, phone number, , insurance Yes  PCP: JULIAN Mariscal CNP    Date of last visit: May 28, 2021    Insurance Provider: [facility] Medicare    Discharge Planning  Current Residence:   house  Living Arrangements:   spouse   Home has 2 stories/5 stairs to climb  Support Systems:   spouse  Current Services PTA:   no Supplier: none  Patient able to perform ADL's:Independent  DME used to aid ambulation prior to admission: 2ww, cane, shower seat, elevated toilet seat, grab bars  During admission: none    Potential Assistance Needed:   home care vs op therapy (requested referral to [facility] therapy)    Pharmacy: [facility] [place], [place], [facility]. [facility]   Potential Assistance Purchasing Medications:   no  Does patient want to participate in local refill/ meds to beds program?   no    Patient agreeable to home care: tbd  [place] of choice provided:  yes      Type of Home Care Services:     Patient expects to be discharged to:   home    Prior SNF/Rehab Placement and Facility: no  Agreeable to SNF/Rehab: No  [place] of choice provided: yes   Evaluation: yes    Expected Discharge date:   21  Follow Up Appointment: Best Day/ Time:      Transportation provider: spouse vs transport  Transportation arrangements needed for discharge: tbd    Discharge Plan:   Patient lives at home with spouse in 2 story home with 5 steps to enter. Patient stays on 1st floor. Patient was independent with adl's and uses 2ww or cane daily. Patient denied any drug or alcohol use. SBIRT completed. Patient stated he does not have POA/Living Will-spouse to assist in decision making if needed.  Patient is admitted for pneumonia of both lungs, epigastric pain and non-intractable vomiting with nausea. Patient has PT/OT, Respiratory care evaluations. Patient started on IV Ceftriaxone and oral Azithromycin. Patient did request a script for outpatient therapy at [facility]. Discharge needs tbd.         Electronically signed by LOUIS Landers on 7/14/21 at 1:00 PM EDT

## 2021-07-15 ENCOUNTER — APPOINTMENT (OUTPATIENT)
Dept: GENERAL RADIOLOGY | Age: 76
DRG: 178 | End: 2021-07-15
Payer: MEDICARE

## 2021-07-15 LAB
ANION GAP SERPL CALCULATED.3IONS-SCNC: 10 MMOL/L (ref 9–17)
BUN BLDV-MCNC: 12 MG/DL (ref 8–23)
BUN/CREAT BLD: 16 (ref 9–20)
CALCIUM SERPL-MCNC: 8.4 MG/DL (ref 8.6–10.4)
CHLORIDE BLD-SCNC: 98 MMOL/L (ref 98–107)
CO2: 28 MMOL/L (ref 20–31)
CREAT SERPL-MCNC: 0.75 MG/DL (ref 0.7–1.2)
EKG ATRIAL RATE: 70 BPM
EKG Q-T INTERVAL: 492 MS
EKG QRS DURATION: 180 MS
EKG QTC CALCULATION (BAZETT): 534 MS
EKG R AXIS: -88 DEGREES
EKG T AXIS: 76 DEGREES
EKG VENTRICULAR RATE: 71 BPM
GFR AFRICAN AMERICAN: >60 ML/MIN
GFR NON-AFRICAN AMERICAN: >60 ML/MIN
GFR SERPL CREATININE-BSD FRML MDRD: ABNORMAL ML/MIN/{1.73_M2}
GFR SERPL CREATININE-BSD FRML MDRD: ABNORMAL ML/MIN/{1.73_M2}
GLUCOSE BLD-MCNC: 101 MG/DL (ref 70–99)
HCT VFR BLD CALC: 33.2 % (ref 40.7–50.3)
HEMOGLOBIN: 10.4 G/DL (ref 13–17)
MAGNESIUM: 1.6 MG/DL (ref 1.6–2.6)
MCH RBC QN AUTO: 32.4 PG (ref 25.2–33.5)
MCHC RBC AUTO-ENTMCNC: 31.3 G/DL (ref 28.4–34.8)
MCV RBC AUTO: 103.4 FL (ref 82.6–102.9)
NRBC AUTOMATED: 0 PER 100 WBC
PDW BLD-RTO: 14.9 % (ref 11.8–14.4)
PLATELET # BLD: 113 K/UL (ref 138–453)
PMV BLD AUTO: 8.9 FL (ref 8.1–13.5)
POTASSIUM SERPL-SCNC: 3.4 MMOL/L (ref 3.7–5.3)
RBC # BLD: 3.21 M/UL (ref 4.21–5.77)
SODIUM BLD-SCNC: 136 MMOL/L (ref 135–144)
WBC # BLD: 7.9 K/UL (ref 3.5–11.3)

## 2021-07-15 PROCEDURE — 85027 COMPLETE CBC AUTOMATED: CPT

## 2021-07-15 PROCEDURE — 94761 N-INVAS EAR/PLS OXIMETRY MLT: CPT

## 2021-07-15 PROCEDURE — 2580000003 HC RX 258: Performed by: FAMILY MEDICINE

## 2021-07-15 PROCEDURE — 6360000002 HC RX W HCPCS: Performed by: FAMILY MEDICINE

## 2021-07-15 PROCEDURE — 97166 OT EVAL MOD COMPLEX 45 MIN: CPT

## 2021-07-15 PROCEDURE — 74230 X-RAY XM SWLNG FUNCJ C+: CPT

## 2021-07-15 PROCEDURE — 92611 MOTION FLUOROSCOPY/SWALLOW: CPT

## 2021-07-15 PROCEDURE — 83735 ASSAY OF MAGNESIUM: CPT

## 2021-07-15 PROCEDURE — 97530 THERAPEUTIC ACTIVITIES: CPT

## 2021-07-15 PROCEDURE — 36415 COLL VENOUS BLD VENIPUNCTURE: CPT

## 2021-07-15 PROCEDURE — 99222 1ST HOSP IP/OBS MODERATE 55: CPT | Performed by: INTERNAL MEDICINE

## 2021-07-15 PROCEDURE — 6370000000 HC RX 637 (ALT 250 FOR IP): Performed by: NURSE PRACTITIONER

## 2021-07-15 PROCEDURE — 71046 X-RAY EXAM CHEST 2 VIEWS: CPT

## 2021-07-15 PROCEDURE — 2580000003 HC RX 258: Performed by: NURSE PRACTITIONER

## 2021-07-15 PROCEDURE — 80048 BASIC METABOLIC PNL TOTAL CA: CPT

## 2021-07-15 PROCEDURE — 6360000002 HC RX W HCPCS: Performed by: NURSE PRACTITIONER

## 2021-07-15 PROCEDURE — C9113 INJ PANTOPRAZOLE SODIUM, VIA: HCPCS | Performed by: FAMILY MEDICINE

## 2021-07-15 PROCEDURE — 97535 SELF CARE MNGMENT TRAINING: CPT

## 2021-07-15 PROCEDURE — 94640 AIRWAY INHALATION TREATMENT: CPT

## 2021-07-15 PROCEDURE — 6370000000 HC RX 637 (ALT 250 FOR IP): Performed by: FAMILY MEDICINE

## 2021-07-15 PROCEDURE — 99238 HOSP IP/OBS DSCHRG MGMT 30/<: CPT | Performed by: FAMILY MEDICINE

## 2021-07-15 PROCEDURE — 97162 PT EVAL MOD COMPLEX 30 MIN: CPT

## 2021-07-15 PROCEDURE — 1200000000 HC SEMI PRIVATE

## 2021-07-15 PROCEDURE — 93010 ELECTROCARDIOGRAM REPORT: CPT | Performed by: INTERNAL MEDICINE

## 2021-07-15 RX ORDER — AMOXICILLIN AND CLAVULANATE POTASSIUM 875; 125 MG/1; MG/1
1 TABLET, FILM COATED ORAL 2 TIMES DAILY
Qty: 14 TABLET | Refills: 0 | Status: SHIPPED | OUTPATIENT
Start: 2021-07-15 | End: 2021-07-22

## 2021-07-15 RX ORDER — FENTANYL CITRATE 50 UG/ML
50 INJECTION, SOLUTION INTRAMUSCULAR; INTRAVENOUS ONCE
Status: COMPLETED | OUTPATIENT
Start: 2021-07-15 | End: 2021-07-15

## 2021-07-15 RX ORDER — ALBUTEROL SULFATE 2.5 MG/3ML
2.5 SOLUTION RESPIRATORY (INHALATION) 2 TIMES DAILY
Status: DISCONTINUED | OUTPATIENT
Start: 2021-07-15 | End: 2021-07-16 | Stop reason: HOSPADM

## 2021-07-15 RX ADMIN — FLUTICASONE PROPIONATE 2 SPRAY: 50 SPRAY, METERED NASAL at 10:58

## 2021-07-15 RX ADMIN — CEFTRIAXONE SODIUM 1000 MG: 1 INJECTION, POWDER, FOR SOLUTION INTRAMUSCULAR; INTRAVENOUS at 11:03

## 2021-07-15 RX ADMIN — BUDESONIDE AND FORMOTEROL FUMARATE DIHYDRATE 2 PUFF: 160; 4.5 AEROSOL RESPIRATORY (INHALATION) at 08:00

## 2021-07-15 RX ADMIN — PANCRELIPASE 24000 UNITS: 24000; 76000; 120000 CAPSULE, DELAYED RELEASE PELLETS ORAL at 17:41

## 2021-07-15 RX ADMIN — IBUPROFEN 600 MG: 400 TABLET, FILM COATED ORAL at 20:51

## 2021-07-15 RX ADMIN — TAMSULOSIN HYDROCHLORIDE 0.4 MG: 0.4 CAPSULE ORAL at 10:44

## 2021-07-15 RX ADMIN — GABAPENTIN 300 MG: 300 CAPSULE ORAL at 10:44

## 2021-07-15 RX ADMIN — ROPINIROLE HYDROCHLORIDE 0.25 MG: 0.25 TABLET, FILM COATED ORAL at 20:49

## 2021-07-15 RX ADMIN — CETIRIZINE HYDROCHLORIDE 10 MG: 10 TABLET, FILM COATED ORAL at 10:45

## 2021-07-15 RX ADMIN — SODIUM CHLORIDE, PRESERVATIVE FREE 10 ML: 5 INJECTION INTRAVENOUS at 10:55

## 2021-07-15 RX ADMIN — BUMETANIDE 1 MG: 1 TABLET ORAL at 10:48

## 2021-07-15 RX ADMIN — ROPINIROLE HYDROCHLORIDE 0.25 MG: 0.25 TABLET, FILM COATED ORAL at 10:45

## 2021-07-15 RX ADMIN — METOPROLOL SUCCINATE 50 MG: 50 TABLET, EXTENDED RELEASE ORAL at 20:49

## 2021-07-15 RX ADMIN — IPRATROPIUM BROMIDE AND ALBUTEROL SULFATE 1 AMPULE: .5; 2.5 SOLUTION RESPIRATORY (INHALATION) at 08:00

## 2021-07-15 RX ADMIN — ENOXAPARIN SODIUM 40 MG: 40 INJECTION SUBCUTANEOUS at 10:43

## 2021-07-15 RX ADMIN — OXYCODONE HYDROCHLORIDE AND ACETAMINOPHEN 500 MG: 500 TABLET ORAL at 10:45

## 2021-07-15 RX ADMIN — ALBUTEROL SULFATE 2.5 MG: 2.5 SOLUTION RESPIRATORY (INHALATION) at 19:46

## 2021-07-15 RX ADMIN — FENTANYL CITRATE 50 MCG: 50 INJECTION, SOLUTION INTRAMUSCULAR; INTRAVENOUS at 22:34

## 2021-07-15 RX ADMIN — PANTOPRAZOLE SODIUM 40 MG: 40 INJECTION, POWDER, FOR SOLUTION INTRAVENOUS at 10:45

## 2021-07-15 RX ADMIN — PANCRELIPASE 24000 UNITS: 24000; 76000; 120000 CAPSULE, DELAYED RELEASE PELLETS ORAL at 10:45

## 2021-07-15 RX ADMIN — FERROUS SULFATE TAB EC 325 MG (65 MG FE EQUIVALENT) 325 MG: 325 (65 FE) TABLET DELAYED RESPONSE at 10:45

## 2021-07-15 RX ADMIN — FERROUS SULFATE TAB EC 325 MG (65 MG FE EQUIVALENT) 325 MG: 325 (65 FE) TABLET DELAYED RESPONSE at 17:41

## 2021-07-15 RX ADMIN — BUDESONIDE AND FORMOTEROL FUMARATE DIHYDRATE 2 PUFF: 160; 4.5 AEROSOL RESPIRATORY (INHALATION) at 19:46

## 2021-07-15 RX ADMIN — AZITHROMYCIN MONOHYDRATE 500 MG: 250 TABLET ORAL at 10:44

## 2021-07-15 RX ADMIN — GABAPENTIN 300 MG: 300 CAPSULE ORAL at 20:49

## 2021-07-15 RX ADMIN — SODIUM CHLORIDE, PRESERVATIVE FREE 10 ML: 5 INJECTION INTRAVENOUS at 10:47

## 2021-07-15 ASSESSMENT — PAIN SCALES - GENERAL
PAINLEVEL_OUTOF10: 5
PAINLEVEL_OUTOF10: 0
PAINLEVEL_OUTOF10: 5
PAINLEVEL_OUTOF10: 0

## 2021-07-15 ASSESSMENT — ENCOUNTER SYMPTOMS
CHEST TIGHTNESS: 0
NAUSEA: 0
SORE THROAT: 0
DIARRHEA: 0
SINUS PRESSURE: 0
TROUBLE SWALLOWING: 1
COUGH: 0
ABDOMINAL PAIN: 0
BACK PAIN: 0
VOMITING: 1
RHINORRHEA: 0
CONSTIPATION: 0
NAUSEA: 1
VOMITING: 0
CHOKING: 0
SHORTNESS OF BREATH: 0
APNEA: 0
WHEEZING: 0
VOICE CHANGE: 0
ABDOMINAL DISTENTION: 1

## 2021-07-15 NOTE — PROGRESS NOTES
Occupational Therapy   Occupational Therapy Initial Assessment  Date: 7/15/2021   Patient Name: Celine Ramachandran  MRN: [id number]     : 1945    Date of Service: 7/15/2021     RN Mini Hunt reports patient is medically stable for therapy treatment this date. Chart reviewed prior to treatment and patient is agreeable for therapy. All lines intact and patient positioned comfortably at end of treatment. All patient needs addressed prior to ending therapy session. Due to recent hospitalization and medical condition, pt would benefit from additional therapy at time of discharge to ensure safety. Please refer to the AM-PAC score for current functional status. Discharge Recommendations:  Patient would benefit from continued therapy after discharge       Assessment   Performance deficits / Impairments: Decreased functional mobility ; Decreased endurance;Decreased ADL status; Decreased posture;Decreased balance;Decreased strength;Decreased safe awareness;Decreased high-level IADLs  Assessment: Skilled OT is indicated to increase ADL completion by improving activity tolerance as BP improves and strength for safety in returning home with I and at prior level of functioning. Prognosis: Fair  Decision Making: Medium Complexity  OT Education: OT Role;Plan of Care;ADL Adaptive Strategies;Transfer Training;Energy Conservation  Patient Education: incentive spirometer, pursed lip breathing, recommendations for continued therapy, call light use, fall prevention  REQUIRES OT FOLLOW UP: Yes  Activity Tolerance  Activity Tolerance: Treatment limited secondary to medical complications (free text) (Pt with low BP.)  Activity Tolerance: fair minus  Safety Devices  Safety Devices in place: Yes  Type of devices: All fall risk precautions in place; Left in bed;Bed alarm in place;Call light within reach;Nurse notified;Gait belt;Patient at risk for falls           Patient Diagnosis(es): The primary encounter diagnosis was Pneumonia of both lungs due to infectious organism, unspecified part of lung. Diagnoses of Non-intractable vomiting with nausea, unspecified vomiting type and Epigastric pain were also pertinent to this visit. has a past medical history of Acute superficial gastritis without hemorrhage, Anastomotic stricture of stomach, Asthma, Atrial fibrillation ([address] Calculus of gallbladder without cholecystitis without obstruction, Chronic diastolic heart failure (HCC), Chronic rhinosinusitis, Class 2 severe obesity due to excess calories with serious comorbidity and body mass index (BMI) of 36.0 to 36.9 in [facility]), COPD (chronic obstructive pulmonary disease) ([address] E. coli septicemia ([address] E. coli UTI, Foot drop, right, Fracture of femur ([address] Gait disorder, Gastric out let obstruction, GERD (gastroesophageal reflux disease), Hallux valgus, acquired, bilateral, Hyperlipidemia, Hypertension, Impaired hearing, Internal hemorrhoids, Lymphedema of both lower extremities, Nausea & vomiting, OA (osteoarthritis) of knee, bilateral, Obesity, MARIAMA on CPAP, Osteoarthritis, Osteoarthritis of lumbar spine, S/P revision of total knee, Septicemia due to E. coli ([address] Sick sinus syndrome ([address] Simple chronic bronchitis ([address] Slow transit constipation, Unspecified sleep apnea, and UTI (urinary tract infection). has a past surgical history that includes Finger amputation (1966); Gastric bypass surgery (1985); Carpal tunnel release; Colonoscopy; Rotator cuff repair; joint replacement (2004 & 2005); Hemorrhoid surgery; pr egd transoral biopsy single/multiple (N/A, 05/25/2018); Upper gastrointestinal endoscopy (08/13/2018); Upper gastrointestinal endoscopy (N/A, 08/13/2018); Upper gastrointestinal endoscopy (N/A, 08/13/2018); Upper gastrointestinal endoscopy (08/16/2018); pr egd flexible foreign body removal (N/A, 08/16/2018); Upper gastrointestinal endoscopy (08/16/2018); Upper gastrointestinal endoscopy (N/A, 10/01/2018);  Upper gastrointestinal endoscopy (10/01/2018); Upper gastrointestinal endoscopy (N/A, 11/19/2018); Cystoscopy (01/02/2019); Cystoscopy (N/A, 01/02/2019); Upper gastrointestinal endoscopy (N/A, 10/15/2020); Colonoscopy (N/A, 04/05/2021); and Pacemaker insertion (04/09/2021). Per H&P: William Ramsay is a 77 y.o. Non-/non  male who presents with Fever and Emesis   and is admitted to the hospital for the management of Community acquired pneumonia, bilateral.      Restrictions  Restrictions/Precautions  Restrictions/Precautions: Up as Tolerated, General Precautions, Fall Risk  Position Activity Restriction  Other position/activity restrictions: alarms, heels off bed, NPO, RUE IV, pacemaker    Subjective   General  Chart Reviewed: Yes  Patient assessed for rehabilitation services?: Yes  Family / Caregiver Present: Yes (wife, Tg Gonzalez)  Patient Currently in Pain: Yes (Pt reports pain in back of neck and in ribs, as well as headache.)    Social/Functional History  Social/Functional History  Lives With: Spouse, Daughter (Someone is typically home, and everyone is supportive and able to help.)  Type of Home: House  Home Layout: Two level, Able to Live on Main level with bedroom/bathroom (Pt stays on first floor.)  Home Access: Stairs to enter with rails (Pt uses house to hold onto on the L side.)  Entrance Stairs - Number of Steps: 5  Entrance Stairs - Rails: Right  Bathroom Shower/Tub: Tub/Shower unit  Bathroom Toilet: Handicap height  Bathroom Equipment: Shower chair, Grab bars in shower, Grab bars around toilet  Home Equipment: Raymundo Negron, 4 wheeled walker  ADL Assistance: Independent  Homemaking Assistance: Independent (Pt states that he doesn't have difficulties with it, but chooses to have someone else take care of it.)  Ambulation Assistance: Independent (Pt reports that he uses 4WW around house and in community.  He also uses the cane in the house sometimes.)  Transfer Assistance: Independent  Active : No  Patient's  Info: children normally drive  Occupation: Retired  Type of occupation: Pt was a . Leisure & Hobbies: house projects, such as \"building a wooden frame for air conditioning unit\"  Additional Comments: Pt reports no recent falls or near falls ever. Objective   Vision: Impaired  Vision Exceptions: Wears glasses at all times  Hearing: Exceptions to [facility]  Hearing Exceptions: Hard of hearing/hearing concerns;Bilateral hearing aid    Orientation  Overall Orientation Status: Within Functional Limits  Observation/Palpation  Observation: Pt has B AFO's for foot drop that he reports he's had for a long time. Seated EOB pt reported dizziness, BP 96/67 (MAP 76). Standing 90/56 (MAP 68). Seated after activity 112/66 (MAP 79). Scar: B TKA scars. Pt reports he's had the surgery twice on each side. Balance  Sitting Balance: Stand by assistance  Standing Balance: Minimal assistance (with RW)  Standing Balance  Time: ~ 1 min for functional mob  Functional Mobility  Functional - Mobility Device: Rolling Walker  Activity:  (EOB 3 steps forward and 3 steps to L to get higher in bed.)  Assist Level: Minimal assistance  Functional Mobility Comments: MOD verbal cueing/tactile assist for RW safety of keeping RW close, upright posture, pursed lip breathing, and line mgt to reduce risk of falls. Toilet Transfers  Toilet Transfers Comments: N/T pt with low BP, so not appropriate at this time.      ADL  Feeding: NPO  Grooming: Stand by assistance (seated)  UE Bathing: Stand by assistance (seated)  LE Bathing: Contact guard assistance  UE Dressing: Minimal assistance (To close hosp gown per pt request)  LE Dressing: Contact guard assistance (Pt donned B AFO and shoes by crossing leg over and bending down to the floor to tie shoes with increased time and effort noted.)  Toileting: Minimal assistance     Tone RUE  RUE Tone: Normotonic  Tone LUE  LUE Tone: Normotonic  Coordination  Movements Are Fluid And Coordinated: Yes     Bed mobility  Rolling to Left: Stand by assistance  Supine to Sit: Stand by assistance  Sit to Supine: Stand by assistance  Scooting: Stand by assistance  Comment: MIN verbal cueing/tactile assist for hand placement on bed rail and line mgt to increase overall safety. Transfers  Stand Step Transfers: Minimal assistance (with RW)  Sit to stand: Minimal assistance  Stand to sit: Minimal assistance  Transfer Comments: MIN verbal cueing/tactile assist for B hand placement to push up from bed to stand/reach back to sit, RW safety, pursed lip breathing, and line mgt to reduce risk of falls. Cognition  Overall Cognitive Status: Exceptions  Arousal/Alertness: Appropriate responses to stimuli  Following Commands: Follows multistep commands with increased time; Follows multistep commands with repitition  Attention Span: Appears intact  Memory: Appears intact  Safety Judgement: Decreased awareness of need for assistance;Decreased awareness of need for safety  Problem Solving: Assistance required to generate solutions;Assistance required to implement solutions;Decreased awareness of errors;Assistance required to correct errors made;Assistance required to identify errors made  Insights: Decreased awareness of deficits  Initiation: Does not require cues  Sequencing: Does not require cues  Perception  Overall Perceptual Status: WFL     Sensation  Overall Sensation Status: Impaired (constant numbness/tingling in B hands & feet)        LUE AROM (degrees)  LUE AROM : WFL  RUE AROM (degrees)  RUE AROM : WFL  RUE Strength  RUE Strength Comment: BUE grossly tested 3+/5. Pt reported pain with L shoulder d/t rotator cuff injury.                    Plan   Plan  Times per week: 4-5x/week, 1x/day as alexia  Current Treatment Recommendations: Strengthening, Patient/Caregiver Education & Training, Home Management Training, Equipment Evaluation, Education, & procurement,

## 2021-07-15 NOTE — PLAN OF CARE
Problem: Nutritional:  Goal: Ability to tolerate tube feedings without aspirating will improve  Description: Ability to tolerate tube feedings without aspirating will improve  Outcome: Ongoing  Note: Review diet daily and as needed with pt. Provide supplement nutrition as ordered by physician & educate pt. Review nutritional guidelines with pt. Goal: Consumption of food in small portions  Description: Consumption of food in small portions  Outcome: Ongoing  Goal: Consumption of liquid of appropriate consistency  Description: Consumption of liquid of appropriate consistency  Outcome: Ongoing     Problem: Respiratory:  Goal: Ability to maintain a clear airway will improve  Description: Ability to maintain a clear airway will improve  Outcome: Ongoing  Note: Assess for adventitious breath sounds. Monitored SaO2 > 90%. Applied 02 per nasal cannula as needed. Elevated HOB to improve breathing as needed. Goal: Will remain free from infection  Description: Will remain free from infection  Outcome: Ongoing  Note: Monitor for signs & symptoms of infection. Utilize standard precautions & proper handwashing. Communicate referral to infection control. Goal: Absence of aspiration  Description: Absence of aspiration  Outcome: Ongoing  Note: Assessed for risk for aspiration. NPO if ordered by physician or if unable to swallow. Assessed breath sounds & ability to swallow. Assessed emesis PRN. Problem: Safety:  Goal: Ability to chew and swallow food without choking will improve  Description: Ability to chew and swallow food without choking will improve  Outcome: Ongoing  Note: Assess ability to chew & swallow. Monitor for aspiration. Maintain NPO status is ordered.    Goal: Ability to demonstrate good, daily oral hygiene techniques will improve  Description: Ability to demonstrate good, daily oral hygiene techniques will improve  Outcome: Ongoing  Goal: Maintenance of upright position during and after

## 2021-07-15 NOTE — PLAN OF CARE
GI plan of care: We were consulted due to history of esophageal narrowing and dysphagia    Over the last couple years patient has had multiple hospitalizations due to aspiration pneumonia secondary to nausea/vomiting. Patient states that at times he has early satiety, develops nausea, vomiting. He frequently drinks Coca-Cola/carbonated beverages after these episodes to relieve his symptoms. He reports a sensation of fullness until he vomits to remove the food. PMH includes chronic A. Fib-patient is not on anticoagulation at this time, diastolic heart failure-patient on Bumex and beta-blocker, HTN, HLD, COPD, GERD-on Protonix, esophageal narrowing-on Creon to prevent food bezoar  Gastric bypass surgery 1990, [facility]    2018 EGD per Dr. Lilliana Cruz showed evidence of prior surgical banding with mild narrowing, retained material was noted in the proximal stomach (pills versus gum)The site of the gastric banding was balloon dilated from 15mm to 20 mm. Mild bleeding was noted. Recs included low fiber diet-avoid high residual diet, crush meds for administration meds    10/2020 patient presented with similar symptoms of nausea vomiting aspiration pneumonia after vomiting and CPAP  EGD per Dr. Andrew Galindo showed evidence of vertical banded gastroplasty with a narrow outlet measuring 10 mL in diameter, large food bezoar and gastric pouch, adult endoscope was able to transverse through gastric pouch outlet and into the remnant stomach.    Recs included starting pancreatic lipase enzymes 36,000 units 3 times daily with meals, full liquid diet, encourage use of carbonated cola drinks  Patient will need repeat upper GI in 1 to 2 days to reassess  Recommended patient be reevaluated per bariatric surgeons for possible revision and or placement of stent    11/12/2020 patient followed up with Dr. Chacho Sandoval (bariatric surgeon)-patient decided at that time he did not want surgery and would like to follow-up with Dr. Mariel Doshi for repeat dilatations as needed    4/5/2021 colonoscopy per Dr. David Choudhary due to rectal bleeding revealed small internal hemorrhoids and mild diverticulosis-to be treated with Preparation H suppositories as needed    Full consult note to follow per Dr. Mame Bourgeois: We will plan for endoscopy tomorrow with possible dilatation. PPI.   N.p.o. midnight  Please call with any questions or concerns    Yanni Hammonds, [address]

## 2021-07-15 NOTE — CONSULTS
GI Consult Note:    Name: Alix Kohler  MRN: [id number]     [place]: [de-identified]  Room: [facility]    Admit Date: 7/14/2021  PCP: JULIAN Luna CNP    Physician Requesting Consult: Julio C Lagos MD     Reason for Consult: History of nausea vomiting. Chief Complaint:     Chief Complaint   Patient presents with    Fever    Emesis       History Obtained From:     patient, electronic medical record, nursing staff. History of Present Illness:      Alix Kohler is a  77 y.o.  male who presents with Fever and Emesis      Symptoms:  Patient seen with a history of nausea vomiting. It appears that this patient has the symptom intermittently. Patient gives very vague history. I did review the emergency room record and the following information obtained and reviewed with the patient. This is a 67-year-old male that presents with complaints of nausea, vomiting and fever. Patient began feeling ill last evening, he states that he had multiple episodes of emesis, 1 or 2 with a small amount of bleeding. He states that he has a fever, his family was concerned that he may have aspirated. Patient also has had dark urine, he has been unable to tolerate oral intake. Patient has a history of previous gastric bypass in the 1985 s. He denies any stool changes. Patient is being admitted to the hospital multiple times in the last 2 to 3 years. He had several EGDs done in the past.  The last EGD was in in October 2020 and at that time she was found to have signs of vertical band gastroplasty with food bezoar in the gastric pouch. .  Also he had a colonoscopy done in April 2021 and was found to have diverticulosis and hemorrhoids. When I visited him around 1 PM this patient was eating regular lunch. Also during this admission this patient had swallowing study done and there was no signs of aspiration. Chest x-ray was nonspecific. WBC count normal.  Patient is afebrile.   During my visit he CYSTOSCOPY N/A 01/02/2019    CYSTOSCOPY performed by Neno Bronson MD at [address]    first knuckle right index finger   [address]    vertical banded gastroplasty   [address] REPLACEMENT  2004 & 2005    bilateral knees    PACEMAKER INSERTION  04/09/2021    [place], placed by Dr. Vania Christie EGD [address] N/A 08/16/2018    EGD FOREIGN BODY REMOVAL performed by Nicole Burciaga MD at [address] EGD TRANSORAL BIOPSY SINGLE/MULTIPLE N/A 05/25/2018    EGD BIOPSY performed by Liza Chanel DO at [address] [facility]      left shoulder    UPPER GASTROINTESTINAL ENDOSCOPY  08/13/2018    removal of food bolus    UPPER GASTROINTESTINAL ENDOSCOPY N/A 08/13/2018    EGD FOREIGN BODY REMOVAL performed by Liza Chanel DO at [address] [place] [address] N/A 08/13/2018    EGD BIOPSY performed by Liza Chanel DO at [address] [place] [address]  08/16/2018    egd with balloon dilitation    UPPER GASTROINTESTINAL ENDOSCOPY  08/16/2018    EGD DILATION BALLOON performed by Nicole Burciaga MD at [address] [place] [address] 10/01/2018    EGD BIOPSY performed by Emma Torres MD at [address] [place] [address]  10/01/2018    EGD DILATION BALLOON performed by Emma Torres MD at [address] [place] [address] N/A 11/19/2018    EGD DILATION BALLOON performed by Emma Torres MD at [address] [place] [address] N/A 10/15/2020    EGD SUBMUCOSAL/BOTOX INJECTION tattoo  performed by Reymundo Grullon MD at [facility]        Medications Prior to Admission:       Prior to Admission medications    Medication Sig Start Date End Date Taking?  Authorizing Provider   amoxicillin-clavulanate (AUGMENTIN) 875-125 MG per tablet Take 1 tablet by mouth 2 times daily for 7 days 7/15/21 7/22/21 Yes Regan Méndez MD   lipase-protease-amylase (CREON) 61863-77153 units delayed release capsule TAKE 1 CAPSULE BY MOUTH THREE TIMES DAILY WITH MEALS 6/30/21   JULIAN Campos CNP   metoprolol succinate (TOPROL XL) 50 MG extended release tablet Take 1 tablet by mouth nightly 6/30/21   JULIAN Campos CNP   Multiple Vitamin (MULTI-VITAMINS) TABS Take 1 tablet by mouth daily 6/30/21   JULIAN Campos CNP   gabapentin (NEURONTIN) 300 MG capsule TAKE 1 CAPSULE BY MOUTH THREE TIMES DAILY 6/30/21 7/27/21  JULIAN Campos CNP   potassium chloride (KLOR-CON M) 20 MEQ extended release tablet Take 1 tablet by mouth 2 times daily 6/30/21   JULIAN Campos CNP   rOPINIRole (REQUIP) 0.25 MG tablet TAKE 1 TABLET BY MOUTH TWICE DAILY 6/30/21   JULIAN Campos CNP   cetirizine (ZYRTEC) 10 MG tablet Take 1 tablet by mouth daily 6/2/21   JULIAN Campos CNP   docusate sodium (COLACE) 100 MG capsule Take 1 capsule by mouth 2 times daily as needed for Constipation 5/13/21   JULIAN Campos CNP   tiZANidine (ZANAFLEX) 4 MG tablet TAKE 1 TABLET BY MOUTH DAILY AS NEEDED 4/30/21   JULIAN Campos CNP   loratadine (CLARITIN) 10 MG tablet Take 1 tablet by mouth daily 4/27/21   JULIAN Campos CNP   bumetanide (BUMEX) 1 MG tablet Take 2 mg by mouth daily Take 2 mg of Bumex in morning, 1 mg of Bumex in afternoon, take 1 mg in evening if weight is >3 above baseline weight     Historical Provider, MD   albuterol sulfate HFA (VENTOLIN HFA) 108 (90 Base) MCG/ACT inhaler Inhale 2 puffs into the lungs every 6 hours as needed for Wheezing or Shortness of Breath    Historical Provider, MD   benzonatate (TESSALON) 100 MG capsule TAKE 1 CAPSULE BY MOUTH THREE TIMES DAILY AS NEEDED FOR COUGH 3/31/21   Min Ramirez PA-C   vitamin B-12 (CYANOCOBALAMIN) 100 MCG tablet TAKE 1 TABLET BY MOUTH DAILY AS NEEDED FOR FATIGUE 3/27/21   Min Ramirez PA-C   tamsulosin [facility]) 0.4 MG capsule Take 1 capsule by mouth daily 3/12/21   Noemí Rodriguez PA-C   pantoprazole (PROTONIX) 40 MG tablet TAKE 1 TABLET BY MOUTH DAILY 2/3/21   Noemí Rodriguez PA-C   carboxymethylcellulose (REFRESH PLUS) 0.5 % SOLN ophthalmic solution Apply 1 drop to eye 4 times daily    Historical Provider, MD   Menthol-Methyl Salicylate ([address] to Face Live [facility]) 0.5-15 % CREA Apply topically 2 times daily as needed 6/26/20   Historical Provider, MD   fluticasone [facility]) 50 MCG/ACT nasal spray 2 sprays by Nasal route daily 11/5/20   Noemí Rodriguez PA-C   ascorbic acid (VITAMIN C) 500 MG tablet Take 1 tablet by mouth daily 10/26/20   Noemí Rodriguez PA-C   ferrous sulfate (FE TABS 325) 325 (65 Fe) MG EC tablet Take 1 tablet by mouth 2 times daily (with meals) 10/16/20   Beatris Sullivan PA-C   SM LUBRICANT EYE DROPS 0.4-0.3 % ophthalmic solution PLACE 1 DROP INTO BOTH EYES FOUR TIMES DAILY AS NEEDED FOR DRY EYES 10/17/19   Noemí Rodriguez PA-C   Fluticasone furoate-vilanterol (BREO ELLIPTA) 200-25 MCG/INH AEPB inhaler Inhale 1 puff into the lungs daily    Historical Provider, MD   Handicap Placard [facility] by Does not apply route 6/27/19   Noemí Rodriguez PA-C   Incontinence Supply Disposable (INCONTINENCE BRIEF LARGE) MISC To use as directed. Use 3x a day 4/30/19   Noemí Rodriguez PA-C   Foot Care Products (TRI-BALANCE ORTHOTICS MENS) MISC Provide insurance covered orthotics shoes, wear daily 12/12/18   Noemí Rodriguez PA-C   ipratropium-albuterol (DUONEB) 0.5-2.5 (3) MG/3ML SOLN nebulizer solution Inhale 1 vial into the lungs every 4 hours as needed     Historical Provider, MD   Armodafinil 200 MG TABS Take 1 tablet by mouth 2 times daily. 4/2/18   Historical Provider, MD        Allergies:       Darvocet [propoxyphene n-acetaminophen], Other, Oxycodone-acetaminophen, and Propoxyphene    Social History:     Tobacco:    reports that he quit smoking about 44 years ago.  He has a 20.00 pack-year smoking history. He has never used smokeless tobacco.  Alcohol:      reports no history of alcohol use. Drug Use: reports no history of drug use. Family History:     Family History   Problem Relation Age of Onset    Cancer Mother     Heart Attack Father        Review of Systems:     Review of Systems   Constitutional: Positive for fatigue. Negative for appetite change and fever. HENT: Positive for trouble swallowing. Negative for mouth sores, sore throat and voice change. Eyes:        No ictirus   Respiratory: Negative for apnea, cough, choking, shortness of breath and wheezing. Cardiovascular: Negative for chest pain, palpitations and leg swelling. Gastrointestinal: Positive for abdominal distention, nausea and vomiting. Endocrine: Negative for polydipsia, polyphagia and polyuria. Genitourinary: Negative for frequency, hematuria and urgency. Musculoskeletal: Negative for arthralgias, back pain, gait problem and joint swelling. Skin: Negative for pallor and rash. Allergic/Immunologic: Negative for food allergies. Neurological: Negative for dizziness, seizures, weakness and headaches. Hematological: Negative for adenopathy. Does not bruise/bleed easily. Psychiatric/Behavioral: Positive for behavioral problems. Negative for sleep disturbance. The patient is nervous/anxious. Code Status:  Full Code    Physical Exam:     Vitals:  /64   Pulse 76   Temp 98.4 °F (36.9 °C) (Oral)   Resp 18   Ht 5' 10\" (1.778 m)   Wt 186 lb 8 oz (84.6 kg)   SpO2 99%   BMI 26.76 kg/m²   Temp (24hrs), Av.6 °F (37 °C), Min:98.1 °F (36.7 °C), Max:99.3 °F (37.4 °C)      Physical Exam  Vitals and nursing note reviewed. Constitutional:       General: He is not in acute distress. Appearance: He is well-developed. HENT:      Head: Normocephalic and atraumatic. Mouth/Throat:      Pharynx: No oropharyngeal exudate. Eyes:      General: No scleral icterus.      Conjunctiva/sclera: Conjunctivae 10/30/2013    BASOPCT 0 07/14/2021    BASOPCT 0.7 10/30/2013    MONOSABS 0.53 07/14/2021    MONOSABS 0.5 10/30/2013    LYMPHSABS 0.35 07/14/2021    LYMPHSABS 1.4 10/30/2013    EOSABS 0.09 07/14/2021    EOSABS 0.5 10/30/2013    BASOSABS 0.00 07/14/2021    DIFFTYPE NOT REPORTED 07/14/2021     Hemoglobin/Hematocrit:  Lab Results   Component Value Date    HGB 10.4 07/15/2021    HCT 33.2 07/15/2021     CMP:    Lab Results   Component Value Date     07/15/2021    K 3.4 07/15/2021    CL 98 07/15/2021    CO2 28 07/15/2021    BUN 12 07/15/2021    CREATININE 0.75 07/15/2021    GFRAA >60 07/15/2021    LABGLOM >60 07/15/2021    GLUCOSE 101 07/15/2021    GLUCOSE 99 09/13/2011    PROT 6.9 07/14/2021    LABALBU 3.9 07/14/2021    CALCIUM 8.4 07/15/2021    BILITOT 1.20 07/14/2021    ALKPHOS 95 07/14/2021    AST 23 07/14/2021    ALT 8 07/14/2021     BMP:  Lab Results   Component Value Date     07/15/2021    K 3.4 07/15/2021    CL 98 07/15/2021    CO2 28 07/15/2021    BUN 12 07/15/2021    LABALBU 3.9 07/14/2021    CREATININE 0.75 07/15/2021    CALCIUM 8.4 07/15/2021    GFRAA >60 07/15/2021    LABGLOM >60 07/15/2021    GLUCOSE 101 07/15/2021    GLUCOSE 99 09/13/2011     PT/INR:    Lab Results   Component Value Date    PROTIME 17.7 04/09/2021    PROTIME 16.6 08/16/2018    INR 1.5 04/09/2021     PTT:    Lab Results   Component Value Date    APTT 28.0 10/11/2020   [APTT}    Assesment:     Primary Problem  Aspiration pneumonia (HCC)  Nausea vomiting    History of vertical band gastroplasty  Active Hospital Problems    Diagnosis Date Noted    [facility] [J69.0] 07/14/2021    Chronic diastolic heart failure (HCC) [I50.32]     COPD (chronic obstructive pulmonary disease) ([facility]) [J44.9]     Non-intractable vomiting [R11.10] 11/16/2018    Essential hypertension [I10]     Chronic atrial fibrillation ([facility]) [I48.20]     Dyslipidemia [E78.5]     Gastroesophageal reflux disease without esophagitis [K21.9] Plan:     This patient has vertical band gastroplasty. Patient supposed to have stricture causing mild outlet obstruction. However my suspicion is that the gastric polyp that was created during this vertical banded gastroplasty appears to be/dilated causing gastric bezoar. Discussed with the patient. He requests investigations including EGD. It may be necessary to evaluate whether he developed a bezoar causing obstruction at the gastroplasty site. This will be arranged. Thank you for allowing me to participate in the care of your patient. Please feel free to contact me with anyquestions or concerns. Electronically signed by Marcus Zamudio MD on 7/15/2021 at 6:08 PM     Copy sent to Dr. Luana Araiza, JULIAN - CNP    Note is dictated utilizing voice recognition software. Unfortunately this leads to occasional typographical errors. Please contact our office if you have any questions.

## 2021-07-15 NOTE — PROGRESS NOTES
Physical Therapy    Facility/Department: [facility] PROGRESSIVE CARE  Initial Assessment    NAME: Ronald Escamilla  : 1945  MRN: [id number]    Date of Service: 7/15/2021    Discharge Recommendations:    Due to recent hospitalization and medical condition, pt would benefit from additional therapy at time of discharge to ensure safety. Please refer to the AM-PAC score for current functional status. Ronald Escamilla is a 77 y.o. Non-/non  male who presents with Fever and Emesis   and is admitted to the hospital for the management of Community acquired pneumonia, bilateral.     Patient reports to the emergency department with a 1 day history of general malaise with nausea and emesis. Patient has a history of gastric bypass greater than 20 years ago and has had aspiration pneumonia in the past secondary to esophageal stricture. Accompanying family is concerned with his presentation that he may have aspirated and may be developing pneumonia. Emergency department evaluation is completed and chest CT does reveal multifocal bilateral pneumonia with infiltrates in the bilateral bases and right upper lobe. Covid swab negative. Most likely aspiration pneumonia. Patient reports that he has not had a swallow study in some time. Patient was treated with antiemetics and he did report that this did not alleviate his vomiting however he is still nauseated. Patient reports that he was attempting to make an appointment with his gastroenterologist for routine evaluation of his esophageal stricture. Depending upon evaluation during the hospital course he may require GI evaluation as an inpatient. Assessment   Body structures, Functions, Activity limitations: Decreased functional mobility ; Decreased strength;Decreased safe awareness;Decreased balance;Decreased endurance; Increased pain  Assessment: Pt tolerated PT eval fair this date. Activity limited d/t low BP.   Pt presenting w/ strength, balance, and [facility]), Sick sinus syndrome ([facility] Simple chronic bronchitis ([facility] Slow transit constipation, Unspecified sleep apnea, and UTI (urinary tract infection). has a past surgical history that includes Finger amputation (1966); Gastric bypass surgery (1985); Carpal tunnel release; Colonoscopy; Rotator cuff repair; joint replacement (2004 & 2005); Hemorrhoid surgery; pr egd transoral biopsy single/multiple (N/A, 05/25/2018); Upper gastrointestinal endoscopy (08/13/2018); Upper gastrointestinal endoscopy (N/A, 08/13/2018); Upper gastrointestinal endoscopy (N/A, 08/13/2018); Upper gastrointestinal endoscopy (08/16/2018); pr egd flexible foreign body removal (N/A, 08/16/2018); Upper gastrointestinal endoscopy (08/16/2018); Upper gastrointestinal endoscopy (N/A, 10/01/2018); Upper gastrointestinal endoscopy (10/01/2018); Upper gastrointestinal endoscopy (N/A, 11/19/2018); Cystoscopy (01/02/2019); Cystoscopy (N/A, 01/02/2019); Upper gastrointestinal endoscopy (N/A, 10/15/2020); Colonoscopy (N/A, 04/05/2021); and Pacemaker insertion (04/09/2021). Restrictions  Restrictions/Precautions  Restrictions/Precautions: Up as Tolerated, General Precautions, Fall Risk  Required Braces or Orthoses?:  (Bilateral AFOs d/t drop foot)  Implants present? : Pacemaker  Position Activity Restriction  Other position/activity restrictions: alarms, heels off bed, NPO, RUE IV  Vision/Hearing  Vision: Impaired  Vision Exceptions: Wears glasses at all times  Hearing: Exceptions to [facility]  Hearing Exceptions: Hard of hearing/hearing concerns;Bilateral hearing aid     Subjective  General  Patient assessed for rehabilitation services?: Yes  Response To Previous Treatment: Not applicable  Family / Caregiver Present: Yes (Spouse present at bedside throughout PT eval)  Follows Commands: Within Functional Limits  General Comment  Comments: RN and pt agreeable to therapy.   Pt supine in bed upon arrival.  Pt very pleasant and cooperative throughout. Subjective  Subjective: Pt reporting mild rib, head, and neck pain at time of session. Pain Screening  Patient Currently in Pain: Yes (Pt reports pain in back of neck and in ribs, as well as headache.)          Orientation  Orientation  Overall Orientation Status: Within Functional Limits  Social/Functional History  Social/Functional History  Lives With: Spouse, Daughter (Someone is typically home, and everyone is supportive and able to help.)  Type of Home: House  Home Layout: Two level, Able to Live on Main level with bedroom/bathroom (Pt stays on first floor.)  Home Access: Stairs to enter with rails (Pt uses house to hold onto on the L side.)  Entrance Stairs - Number of Steps: 5  Entrance Stairs - Rails: Right  Bathroom Shower/Tub: Tub/Shower unit  Bathroom Toilet: Handicap height  Bathroom Equipment: Shower chair, Grab bars in shower, Grab bars around toilet  Home Equipment: [place], [employer], 4 wheeled walker (Reports using 4ww most frequently in home and community. Reports use minimal use of cane within home too.)  ADL Assistance: Independent  Homemaking Assistance: Independent (Pt states that he doesn't have difficulties with it, but chooses to have someone else take care of it.)  Ambulation Assistance: Independent  Transfer Assistance: Independent  Active : No  Patient's  Info: children normally drive  Occupation: Retired  Type of occupation:   Leisure & Hobbies: house projects, such as \"building a wooden frame for air conditioning unit\"  Additional Comments: Pt reports no recent falls or near falls ever. Cognition   Cognition  Overall Cognitive Status: Exceptions  Arousal/Alertness: Appropriate responses to stimuli  Following Commands: Follows multistep commands with increased time; Follows multistep commands with repitition  Attention Span: Appears intact  Memory: Appears intact  Safety Judgement: Decreased awareness of need for assistance;Decreased awareness of need for safety  Problem Solving: Assistance required to generate solutions;Assistance required to implement solutions;Decreased awareness of errors;Assistance required to correct errors made;Assistance required to identify errors made  Insights: Decreased awareness of deficits  Initiation: Does not require cues  Sequencing: Requires cues for some    Objective     Observation/Palpation  Posture: Fair  Observation: B AFO's for foot drop  Scar: B TKA scars. Pt reports he's had the surgery twice on each side. AROM RLE (degrees)  RLE AROM: WFL  AROM LLE (degrees)  LLE AROM : WFL  AROM RUE (degrees)  RUE AROM : WFL  AROM LUE (degrees)  LUE AROM : WFL  Strength RLE  Strength RLE: Exception  R Hip Flexion: 3+/5  R Knee Flexion: 4-/5  R Knee Extension: 4-/5  R Ankle Dorsiflexion: 2+/5  R Ankle Plantar flexion: 3/5  Strength LLE  Strength LLE: Exception  L Hip Flexion: 3+/5  L Knee Flexion: 4-/5  L Knee Extension: 4-/5  L Ankle Dorsiflexion: 2+/5  L Ankle Plantar Flexion: 3/5  Strength RUE  Strength RUE: WFL  Comment: See OT assessment for detail  Strength LUE  Strength LUE: WFL  Comment: See OT assessment for detail  Tone RLE  RLE Tone: Normotonic  Tone LLE  LLE Tone: Normotonic  Motor Control  Gross Motor?: WFL  Sensation  Overall Sensation Status: Impaired (reports chronic numbness/tingling in B hands and feet)  Bed mobility  Rolling to Left: Stand by assistance  Supine to Sit: Stand by assistance  Sit to Supine: Stand by assistance  Scooting: Stand by assistance  Comment: Pt requiring use of bedrail for UE assist and requiring cueing for safety w/ line mgmt. Pt performed bed mobility w/o significant difficulty this date. Upon sitting EOB, pt reporting dizziness. Pt's BP was taken and read 96/67 (MAP 76). Pt's BP reported to have been running similarly to this throughout the morning. After ~1min seated rest, dizziness subsided.   Transfers  Sit to Stand: Minimal Assistance  Stand to sit: Minimal Assistance (to control descent)  Comment: Pt performed STS transfer w/ RW demonstrating good handplacement throughout transfer w/o cueing. Upon standing, pt reporting dizziness. BP was re-assessed and measured 90/56 (MAP 68). After standing rest break ~1min, dizziness subsided. Pt requiring mod verbal cueing for upright posture upon standing as pt demonstrate forward head and forward flexed trunk. Ambulation  Ambulation?: Yes  More Ambulation?: No  Ambulation 1  Surface: level tile  Device: Rolling Walker  Other Apparatus: AFO (Bilateral)  Assistance: Minimal assistance  Quality of Gait: fair steadiness  Gait Deviations: Slow Zoie;Decreased step length;Decreased step height  Distance: laterally along EOB 2 steps and forward 3 steps  Comments: Ambulation distance limited d/t pt's decreased BP. Pt w/ fair steadiness throughout ambulation requiring min verbal cueing to maintain JACOB within RW w/ fair return demo. Pt also requiring min verbal cueing for upright posture throughout w/ fair return demo.   Stairs/Curb  Stairs?: No     Balance  Posture: Fair  Sitting - Static: Good;-  Sitting - Dynamic: Fair;+  Standing - Static: Fair;+  Standing - Dynamic: Fair;-  Comments: Standing balance assessed w/ RW        Plan   Plan  Times per week: 5-6x / week  Times per day: Daily  Current Treatment Recommendations: Strengthening, Balance Training, Functional Mobility Training, Stair training, Gait Training, Endurance Training, Transfer Training, Safety Education & Training, Home Exercise Program, Patient/Caregiver Education & Training, Equipment Evaluation, Education, & procurement  Safety Devices  Type of devices: Bed alarm in place, Call light within reach, Gait belt, Nurse notified, Left in bed  Restraints  Initially in place: No    AM-PAC Score  AM-PAC Inpatient Mobility Raw Score : 17 (07/15/21 1120)  AM-PAC Inpatient T-Scale Score : 42.13 (07/15/21 1120)  Mobility Inpatient CMS 0-100% Score: 50.57 (07/15/21 1120)  Mobility Inpatient CMS G-Code Modifier : CK (07/15/21 1120)       Functional Outcome Measure-   Single Leg Stance Test:  0 sec. (<5 sec.= fall risk)      Goals  Short term goals  Time Frame for Short term goals: 14 visits  Short term goal 1: Pt to demonstrate bed mobility independently  Short term goal 2: Pt to ambulate at least 150ft w/ least restrictive AD Simon  Short term goal 3: Pt to ascend/descend 5 stairs w/ handrail CGA  Short term goal 4: Pt to actively participate in at least 30 minutes of physical therapy for therapuetic exercises, gait, balance, and stair training  Short term goal 5: Pt to be independent w/ HEP  Patient Goals   Patient goals :  To feel better, to go home       Therapy Time   Individual Concurrent Group Co-treatment   Time In 0928         Time Out 1012         Minutes 44           Treatment time: 34 minutes        Sylvia Paul, PT

## 2021-07-15 NOTE — FLOWSHEET NOTE
Patient was sleeping as the writer entered the room (spouse present). Writer spoke softly to spouse who stated the patient was sleeping. Writer stated he will prayer for continued rest, healing, and comfort during his stay (writer gave a silent pray outside of the room). Spiritual care will follow up as needed or requested. 07/15/21 1218   Encounter Summary   Services provided to: Patient   Referral/Consult From: [address] [place] [address]   Continue Visiting   (7/15/2021 Pt.  Sleeping)   Complexity of Encounter Low   Length of Encounter 15 minutes   Routine   Type Initial   Assessment Sleeping   Intervention Prayer

## 2021-07-15 NOTE — PLAN OF CARE
Problem: Nutritional:  Goal: Consumption of food in small portions  Description: Consumption of food in small portions  Outcome: Ongoing     Problem: Nutritional:  Goal: Consumption of liquid of appropriate consistency  Description: Consumption of liquid of appropriate consistency  Outcome: Ongoing     Problem: Respiratory:  Goal: Ability to maintain a clear airway will improve  Description: Ability to maintain a clear airway will improve  Outcome: Ongoing     Problem: Respiratory:  Goal: Will remain free from infection  Description: Will remain free from infection  Outcome: Ongoing     Problem: Respiratory:  Goal: Absence of aspiration  Description: Absence of aspiration  Outcome: Ongoing     Problem: Safety:  Goal: Ability to chew and swallow food without choking will improve  Description: Ability to chew and swallow food without choking will improve  Outcome: Ongoing     Problem: Safety:  Goal: Ability to demonstrate good, daily oral hygiene techniques will improve  Description: Ability to demonstrate good, daily oral hygiene techniques will improve  Outcome: Ongoing     Problem: Safety:  Goal: Maintenance of upright position during and after feeding  Description: Maintenance of upright position during and after feeding  Outcome: Ongoing     Problem: Nutrition Deficit:  Goal: Ability to achieve adequate nutritional intake will improve  Description: Ability to achieve adequate nutritional intake will improve  Outcome: Ongoing     Problem: Falls - Risk of:  Goal: Will remain free from falls  Description: Will remain free from falls  Outcome: Ongoing     Problem: Falls - Risk of:  Goal: Absence of physical injury  Description: Absence of physical injury  Outcome: Ongoing     Problem: Skin Integrity:  Goal: Will show no infection signs and symptoms  Description: Will show no infection signs and symptoms  Outcome: Ongoing     Problem: Skin Integrity:  Goal: Absence of new skin breakdown  Description: Absence of new skin breakdown  Outcome: Ongoing

## 2021-07-15 NOTE — PLAN OF CARE
Nutrition Problem #1: Altered GI function  Intervention: Food and/or Nutrient Delivery: Continue NPO  Nutritional Goals: Initiate oral diet within 24 hours

## 2021-07-15 NOTE — CARE COORDINATION
Discharge Planning    Met with patient to discuss PT/OT/ST recommendation for continued therapy at discharge. Pt states neither he nor his wife drive and they depend on family for rides so he is not interested in outpt therapy. Discussed home therapy and pt states he has had it before but his insurance is so limited in number of visits allowed that doing it at home just frustrates him. Phone call to [facility] and confirmed that [facility] approves only 4 visits of therapy at a time and then it can take a week or more to wait to see if more visits can be approved. Pt prefers not to do any home care. Pt has history of gastric bypass and requires EGD for dilatation. GI has been consulted. EGD planned for 7-16  Refuses home care or outpt therapy.

## 2021-07-15 NOTE — PROGRESS NOTES
[facility]  Office: [address], DO, Levell Fabiobs, DO, Crow Miguel, DO, Sunita Clements Blood, DO, Sathish Morales MD, Ryan Walters MD, Julio C Lagos MD, Maddie Gallardo MD, Mitul Cruz MD, Mariya Domingo MD, Annie Duckworth MD, Quirino Lim, DO, Valery Munguia MD, Rosa Canela, DO, Beth Victoria MD,  Mayur Weber, DO, Fabiola Ortiz MD, Solo Nova MD, Raenette Felty, MD, Darnell Phan MD, Jonle Mccabe MD, Melanie Kramer MD, Molly Jang, [facility], [facility] Rick, CNP, Mey Patrick, CNP, Desean Jones, CNS, Lionel Garcia, CNP, Erum Aleamn, CNP, Nancy He, CNP, Jaylen Mccarthy, CNP, Meryl Heard, CNP, Mani De La Torre PA-C, Elvia Guallpa, [facility], David Haque, CNP, Adair Perez, CNP, Robert Maradiaga, CNP, Yarely Lopez, CNP, Partha James, CNP, Sada Wong, CNP, Carla Harrison, CNP, Adilene Delvalle [facility]      Daily Progress Note     Admit Date: 7/14/2021  Bed/Room No.  1008/1008-02  Admitting Physician : Julio C Lagos MD  Code Status :Full Code  Hospital Day:  LOS: 1 day   Chief Complaint:     Chief Complaint   Patient presents with    Fever    Emesis     Principal Problem:    Aspiration pneumonia [facility])  Active Problems:    Chronic atrial fibrillation ([facility]    Dyslipidemia    Gastroesophageal reflux disease without esophagitis    Essential hypertension    Non-intractable vomiting    COPD (chronic obstructive pulmonary disease) ([facility]    Chronic diastolic heart failure ([facility]  Resolved Problems:    * No resolved hospital problems. *    Subjective : Interval History/Significant events :  07/15/21    Patient says that he did not like he food, patient denies any dysphagia but was having burping after diet . He had swallow study today which was normal. Patient has been afebrile since admission . He is breathing on room Air. Patient says that he is having bilateral rib pain, pain is worse with breathing.  He has been vomiting and had coughing at home. Vitals - low blood pressure  afebrile  Labs - normal white count     Nursing notes , Consults notes reviewed. Overnight events and updates discussed with Nursing staff . Background History:         Gilberto Donnelly is 77 y.o. male  Who was admitted to the hospital on 7/14/2021 for treatment of Aspiration pneumonia ([facility] Patient came to emergency room with intractable nausea and vomiting for last 1 day. Wife also reported fever 101 °F today. Patient was also having difficulty in breathing. He has history of esophageal narrowing requiring multiple EGD for dilation. Patient has history of gastric bypass in 46s. He has multiple comorbidities including A. fib, sleep apnea, hypertension, chronic bronchitis, chronic diastolic heart failure. Evaluation emergency room showed fever 100.2 °F, tachycardia 108. Lab evaluation showed normal left lites, kidney function, lactic acid 1.4, white count 8.8, hemoglobin 12.8. Chest x-ray was negative. CT chest showed bilateral lower lobe and right upper lobe groundglass infiltrates. Covid test was negative. Patient was started on Rocephin. He had swallow study that was negative for aspiration.        PMH:  Past Medical History:   Diagnosis Date    Acute superficial gastritis without hemorrhage 05/26/2018    Anastomotic stricture of stomach     Asthma     Atrial fibrillation ([facility]     On Xarelto 6/27/2020    Calculus of gallbladder without cholecystitis without obstruction 05/02/2017    Chronic diastolic heart failure ([facility] 11/17/2017    Chronic rhinosinusitis 04/13/2015    Class 2 severe obesity due to excess calories with serious comorbidity and body mass index (BMI) of 36.0 to 36.9 in adult [facility]) 11/17/2017    COPD (chronic obstructive pulmonary disease) ([facility]     E. coli septicemia (HCC)     E. coli UTI     Foot drop, right 07/10/2012    Fracture of femur ([facility] 10/23/2016    Gait disorder 07/20/2016    Gastric out let obstruction     GERD (gastroesophageal reflux disease)     Hallux valgus, acquired, bilateral 06/24/2015    Hyperlipidemia     Hypertension     Impaired hearing 04/13/2015    Internal hemorrhoids 01/03/2017    Lymphedema of both lower extremities 06/24/2015    Nausea & vomiting 11/16/2018    OA (osteoarthritis) of knee, bilateral 12/11/2006    Obesity     MARIAMA on CPAP 05/02/2017    Osteoarthritis     Osteoarthritis of lumbar spine 12/13/2007     replace inactive diagnosis    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 coli (HCC)     Due to UTI     Sick sinus syndrome (HCC)     Simple chronic bronchitis (HCC) 04/10/2018    Slow transit constipation 11/03/2016    Unspecified sleep apnea     UTI (urinary tract infection)       Allergies: Allergies   Allergen Reactions    Darvocet [Propoxyphene N-Acetaminophen] Hives    Other Hives    Oxycodone-Acetaminophen     Propoxyphene      Other reaction(s): Unknown      Medications :  Armodafinil, 1 tablet, Mouth/Throat, BID  bumetanide, 1 mg, Oral, Daily  cetirizine, 10 mg, Oral, Daily  ferrous sulfate, 325 mg, Oral, BID WC  fluticasone, 2 spray, Nasal, Daily  budesonide-formoterol, 2 puff, Inhalation, BID  gabapentin, 300 mg, Oral, TID  lipase-protease-amylase, 24,000 Units, Oral, TID WC  metoprolol succinate, 50 mg, Oral, Nightly  rOPINIRole, 0.25 mg, Oral, BID  tamsulosin, 0.4 mg, Oral, Daily  sodium chloride flush, 5-40 mL, Intravenous, 2 times per day  enoxaparin, 40 mg, Subcutaneous, Daily  cefTRIAXone (ROCEPHIN) IV, 1,000 mg, Intravenous, Q24H   And  azithromycin, 500 mg, Oral, Q24H  pantoprazole, 40 mg, Intravenous, Daily   And  sodium chloride (PF), 10 mL, Intravenous, Daily  ascorbic acid, 500 mg, Oral, Daily  ipratropium-albuterol, 1 ampule, Inhalation, BID        Review of Systems   Review of Systems   Constitutional: Negative for activity change, appetite change, fatigue, fever and unexpected weight change.    HENT: Negative for congestion, nosebleeds, rhinorrhea, sinus pressure, sneezing and voice change. Respiratory: Negative for cough, choking, chest tightness, shortness of breath and wheezing. Cardiovascular: Negative for chest pain, palpitations and leg swelling. Gastrointestinal: Negative for abdominal pain, constipation, diarrhea, nausea and vomiting. Genitourinary: Negative for difficulty urinating, discharge, dysuria, frequency and testicular pain. Musculoskeletal: Negative for back pain. Skin: Negative for rash. Neurological: Negative for dizziness, weakness, light-headedness and headaches. Hematological: Does not bruise/bleed easily. Psychiatric/Behavioral: Negative for agitation, behavioral problems, confusion, self-injury, sleep disturbance and suicidal ideas. Objective :      Current Vitals : Temp: 98.1 °F (36.7 °C),  Pulse: 70, Resp: 16, BP: (!) 98/57, SpO2: 94 %  Last 24 Hrs Vitals   Patient Vitals for the past 24 hrs:   BP Temp Temp src Pulse Resp SpO2 Height Weight   07/15/21 0402 -- -- -- -- -- -- -- 186 lb 8 oz (84.6 kg)   07/15/21 0334 (!) 98/57 98.1 °F (36.7 °C) Oral 70 16 94 % -- --   07/14/21 2340 99/60 99.3 °F (37.4 °C) Oral 70 20 92 % -- --   07/14/21 1954 100/61 99 °F (37.2 °C) Oral 70 18 96 % -- --   07/14/21 1821 103/71 98.8 °F (37.1 °C) Oral 71 20 98 % -- --   07/14/21 1755 -- -- -- -- -- 98 % -- --   07/14/21 1743 -- -- -- -- -- 96 % -- --   07/14/21 1516 -- 98.4 °F (36.9 °C) Oral -- -- -- -- --   07/14/21 0822 100/65 100.2 °F (37.9 °C) Oral 108 18 98 % 5' 10\" (1.778 m) 192 lb (87.1 kg)     Intake / output   07/14 0701 - 07/15 0700  In: -   Out: 815 [Urine:815]  Physical Exam:  Physical Exam  Vitals and nursing note reviewed. Constitutional:       General: He is not in acute distress. Appearance: He is not diaphoretic. HENT:      Head: Normocephalic and atraumatic. Nose:      Right Sinus: No maxillary sinus tenderness or frontal sinus tenderness.       Left Sinus: No maxillary sinus Ref Range Status   05/01/2021 2 TO 5 0 - 2 /HPF Final     Blood, UA POC   Date Value Ref Range Status   01/14/2021 2+  Final     Bacteria, UA   Date Value Ref Range Status   05/01/2021 NOT REPORTED None Final     Nitrite, Urine   Date Value Ref Range Status   07/14/2021 NEGATIVE NEGATIVE Final     WBC, UA   Date Value Ref Range Status   05/01/2021 0 TO 2 0 - 5 /HPF Final     Leukocyte Esterase, Urine   Date Value Ref Range Status   07/14/2021 NEGATIVE NEGATIVE Final       Imaging / Clinical Data :-   CT CHEST WO CONTRAST    Result Date: 7/14/2021  1. Bilateral lower lobe and right upper lobe ground-glass infiltrates. This has the appearance of an atypical pneumonia, including viral pneumonia. Follow-up to resolution is recommended. 2. Cardiomegaly with trace pericardial effusion. 3. No acute intra-abdominal or pelvic abnormality. 4. Cholelithiasis. CT ABDOMEN PELVIS W IV CONTRAST Additional Contrast? None    Result Date: 7/14/2021  1. Bilateral lower lobe and right upper lobe ground-glass infiltrates. This has the appearance of an atypical pneumonia, including viral pneumonia. Follow-up to resolution is recommended. 2. Cardiomegaly with trace pericardial effusion. 3. No acute intra-abdominal or pelvic abnormality. 4. Cholelithiasis. XR CHEST PORTABLE    Result Date: 7/14/2021  No acute cardiopulmonary process. Clinical Course : stable  Assessment and Plan  :        Aspiration pneumonitis - IV antibiotics rocephin. H/o esophageal narrowing -check swallow study, consult GI. Last EGD 9 months before showed food impaction and narrow outlet in esophagus. Protonix IV. Essential hypertension-controlled. Proximal atrial fibrillation-on Xarelto. Hold anticoagulation for now for possible procedure. Sleep apnea -   S/p PPM placement. H/o gastric bypass     Awaiting further GI recommendations. Discharge planning if no further intervention EGD planned.        Continue to monitor vitals , Intake / output ,  Cell count , HGB , Kidney function, oxygenation  as indicated . Plan and updates discussed with patient , patients spouse   answers  explained to satisfaction.    Plan discussed with Staff Archie Sutherland RN     (Please note that portions of this note were completed with a voice recognition program. Efforts were made to edit the dictations but occasionally words are mis-transcribed.)      Maxine Aguillon MD  7/15/2021

## 2021-07-15 NOTE — PROCEDURES
INSTRUMENTAL SWALLOW REPORT  MODIFIED BARIUM SWALLOW    NAME: Atul Flood   : 1945  MRN: [id number]       Date of Eval: 7/15/2021       Referring Diagnosis(es):      Past Medical History:  has a past medical history of Acute superficial gastritis without hemorrhage, Anastomotic stricture of stomach, Asthma, Atrial fibrillation ([address] Calculus of gallbladder without cholecystitis without obstruction, Chronic diastolic heart failure ([address] Chronic rhinosinusitis, Class 2 severe obesity due to excess calories with serious comorbidity and body mass index (BMI) of 36.0 to 36.9 in adult [facility]), COPD (chronic obstructive pulmonary disease) ([address] E. coli septicemia ([address] E. coli UTI, Foot drop, right, Fracture of femur ([address] Gait disorder, Gastric out let obstruction, GERD (gastroesophageal reflux disease), Hallux valgus, acquired, bilateral, Hyperlipidemia, Hypertension, Impaired hearing, Internal hemorrhoids, Lymphedema of both lower extremities, Nausea & vomiting, OA (osteoarthritis) of knee, bilateral, Obesity, MARIAMA on CPAP, Osteoarthritis, Osteoarthritis of lumbar spine, S/P revision of total knee, Septicemia due to E. coli ([address] Sick sinus syndrome ([address] Simple chronic bronchitis ([address] Slow transit constipation, Unspecified sleep apnea, and UTI (urinary tract infection). Past Surgical History:  has a past surgical history that includes Finger amputation (); Gastric bypass surgery (); Carpal tunnel release; Colonoscopy; Rotator cuff repair; joint replacement ( & ); Hemorrhoid surgery; pr egd transoral biopsy single/multiple (N/A, 2018); Upper gastrointestinal endoscopy (2018); Upper gastrointestinal endoscopy (N/A, 2018); Upper gastrointestinal endoscopy (N/A, 2018); Upper gastrointestinal endoscopy (2018); pr egd flexible foreign body removal (N/A, 2018); Upper gastrointestinal endoscopy (2018);  Upper gastrointestinal endoscopy (N/A, 10/01/2018); Upper gastrointestinal endoscopy (10/01/2018); Upper gastrointestinal endoscopy (N/A, 11/19/2018); Cystoscopy (01/02/2019); Cystoscopy (N/A, 01/02/2019); Upper gastrointestinal endoscopy (N/A, 10/15/2020); Colonoscopy (N/A, 04/05/2021); and Pacemaker insertion (04/09/2021). Current Diet Solid Consistency: NPO  Current Diet Liquid Consistency: NPO    Type of Study: Initial MBS    Recent CXR/CT of Chest: 7/15/2021    Impression   No radiologic evidence of acute cardiopulmonary disease. Patient Complaints/Reason for Referral:  Sanchez Barone was referred for a MBS to assess the efficiency of his/her swallow function, assess for aspiration, and to make recommendations regarding safe dietary consistencies, effective compensatory strategies, and safe eating environment. Onset of problem:   Sanchez Barone is a 77 y.o. Non-/non  male who presents with Fever and Emesis   and is admitted to the hospital for the management of Community acquired pneumonia, bilateral.     Patient reports to the emergency department with a 1 day history of general malaise with nausea and emesis. Patient has a history of gastric bypass greater than 20 years ago and has had aspiration pneumonia in the past secondary to esophageal stricture. Accompanying family is concerned with his presentation that he may have aspirated and may be developing pneumonia. Emergency department evaluation is completed and chest CT does reveal multifocal bilateral pneumonia with infiltrates in the bilateral bases and right upper lobe. Covid swab negative. Most likely aspiration pneumonia. Patient reports that he has not had a swallow study in some time. Patient was treated with antiemetics and he did report that this did not alleviate his vomiting however he is still nauseated. Patient reports that he was attempting to make an appointment with his gastroenterologist for routine evaluation of his esophageal stricture. Depending upon evaluation during the hospital course he may require GI evaluation as an inpatient. Behavior/Cognition/Vision/Hearing:  Behavior/Cognition: Alert; Cooperative  Vision: Within Functional Limits  Vision Exceptions: Wears glasses at all times  Hearing: Within functional limits    Impressions:  Patient presents with safe swallow for Regular diet with thin liquids as evidenced by no s/s of aspiration noted with consistencies tested. +premature spill to vallecula with all consistencies tested. +min residual in vallecula with regular solid cleared with liquid wash. Recommend small sips and bites, only feed when alert and awake and upright at 90 degrees for all PO intake. Recommend close monitoring for overt/clinical s/s of aspiration and D/C PO intake and complete Modified Barium Swallow Study should they occur. Results and recommendations reported to RN. Patient Position: Lateral and Patient Degrees: 90      Consistencies Administered: Dysphagia Soft and Bite-Sized (Dysphagia III); Reg solid; Dysphagia Pureed (Dysphagia I); Nectar straw; Thin straw       Dysphagia Outcome Severity Scale: Level 6: Within functional limits/Modified independence  Penetration-Aspiration Scale (PAS): 1 - Material does not enter the airway    Recommended Diet:  Solid consistency: Regular  Liquid consistency: Thin  Medication administration: PO    Safe Swallow Protocol:  Compensatory Swallowing Strategies: Upright as possible for all oral intake;Small bites/sips; Remain upright for 30-45 minutes after meals      Recommendations/Treatment  Requires SLP Intervention: Yes  D/C Recommendations: Further therapy recommended at discharge.       Recommended Exercises:    Therapeutic Interventions: Diet tolerance monitoring;Patient/Family education    Education: Images and recommendations were reviewed with pt and RN following this exam.   Patient Education: yes  Patient Education Response: Demonstrated understanding;Verbalizes understanding    Prognosis  Prognosis for safe diet advancement: good  Duration/Frequency of Treatment  Duration/Frequency of Treatment: 1-2x per week      Goals:    Long Term:   To Maximize safety with intake, optimize nutrition/hydration and minimize risk for aspiration. Short Term:  Dysphagia Goals: The patient will tolerate recommended diet without observed clinical signs of aspiration      Oral Preparation / Oral Phase  Oral Phase: WFL  Oral Phase: +premature spill to vallecula with all consistencies tested. +min extended mastication with regular solids. However, oral transit and bolus manipulation are [facility] for all consistencies tested    Pharyngeal Phase  Pharyngeal Phase: WFL  Pharyngeal: Puree: No penetration, no aspiration, no significant residual. Soft solids: No penetration, no aspiration, no significant residual. Regular solid: No penetration, no aspiration, +min residual in vallecual cleared with liquid wash.  Mildly-thick liquids: No penetration, no aspiration, no significant residual. Thin liquids: No penetration, no aspiration, no significant residual.    Esophageal Phase  Esophageal Screen: WFL      Pain   Patient Currently in Pain: No  Pain Level: 0      Therapy Time:   Individual Concurrent Group Co-treatment   Time In 0840         Time Out 0852         Minutes 12                   Taylor Alvarenga, PAIGE, 7/15/2021, 1:00 PM

## 2021-07-15 NOTE — PROGRESS NOTES
Comprehensive Nutrition Assessment    Type and Reason for Visit:  Positive Nutrition Screen (Difficulty chewing or swallowing)    Nutrition Recommendations/Plan:   1. Continue NPO status  2. Monitor labs, diet advancement and tolerance      Nutrition Assessment:  Patient admission is related to community acquired pneumonia with multiple episodes of emesis. RN reports patient is status post swallow study which was passed. Patient has a medical history for gastric bypass and has occasional esophageal narrowing. Patient will have an EGD tomorrow with possible EGD dilation. Patient did eat lunch but is now NPO again. Electronic weight records indicate a possible weight loss of 10.1%  in 2 months. Monitor labs, diet advancement and tolerance. Malnutrition Assessment:  Malnutrition Status:  Insufficient data      Estimated Daily Nutrient Needs:  Energy (kcal):  1934-8727 kcal (22-25 kcal/kg); Weight Used for Energy Requirements:  Current     Protein (g):  90-98 gm (1.2-1.3 gm/kg); Weight Used for Protein Requirements:  Ideal          Nutrition Related Findings:  No edema. Status post modified barium swallow study (7/15)      Wounds:  None       Current Nutrition Therapies:    Diet NPO    Anthropometric Measures:  · Height: 5' 10\" (177.8 cm)  · Current Body Weight: 186 lb (84.4 kg) (unsure if accurate.)   · Admission Body Weight: 192 lb (87.1 kg)    · Usual Body Weight: 207 lb (93.9 kg) (standing scale)     · Ideal Body Weight: 166 lbs; % Ideal Body Weight 112 %   · BMI: 26.7  · BMI Categories: Overweight (BMI 25.0-29. 9)       Nutrition Diagnosis:   · Altered GI function related to altered GI function as evidenced by poor intake prior to admission, vomiting, nausea      Nutrition Interventions:   Food and/or Nutrient Delivery:  Continue NPO  Nutrition Education/Counseling:  Education not indicated   Coordination of Nutrition Care:  Continue to monitor while inpatient    Goals:  Initiate oral diet within 24 hours Nutrition Monitoring and Evaluation:   Behavioral-Environmental Outcomes:      Food/Nutrient Intake Outcomes:  Diet Advancement/Tolerance  Physical Signs/Symptoms Outcomes:  Biochemical Data, Fluid Status or Edema, Skin, Chewing or Swallowing, Weight     Discharge Planning:     Too soon to determine         Caryle Lipps  ADONISN, RDN, LDN  Lead Clinical Dietitian  RD Office Phone [phone]

## 2021-07-16 ENCOUNTER — ANESTHESIA EVENT (OUTPATIENT)
Dept: OPERATING ROOM | Age: 76
DRG: 178 | End: 2021-07-16
Payer: MEDICARE

## 2021-07-16 ENCOUNTER — ANESTHESIA (OUTPATIENT)
Dept: OPERATING ROOM | Age: 76
DRG: 178 | End: 2021-07-16
Payer: MEDICARE

## 2021-07-16 VITALS
SYSTOLIC BLOOD PRESSURE: 116 MMHG | HEIGHT: 70 IN | RESPIRATION RATE: 16 BRPM | TEMPERATURE: 98.2 F | DIASTOLIC BLOOD PRESSURE: 73 MMHG | OXYGEN SATURATION: 99 % | WEIGHT: 187.13 LBS | BODY MASS INDEX: 26.79 KG/M2 | HEART RATE: 70 BPM

## 2021-07-16 VITALS — DIASTOLIC BLOOD PRESSURE: 60 MMHG | SYSTOLIC BLOOD PRESSURE: 92 MMHG | OXYGEN SATURATION: 100 %

## 2021-07-16 LAB
ANION GAP SERPL CALCULATED.3IONS-SCNC: 9 MMOL/L (ref 9–17)
BUN BLDV-MCNC: 11 MG/DL (ref 8–23)
BUN/CREAT BLD: 18 (ref 9–20)
CALCIUM SERPL-MCNC: 8.3 MG/DL (ref 8.6–10.4)
CHLORIDE BLD-SCNC: 100 MMOL/L (ref 98–107)
CO2: 27 MMOL/L (ref 20–31)
CREAT SERPL-MCNC: 0.62 MG/DL (ref 0.7–1.2)
GFR AFRICAN AMERICAN: >60 ML/MIN
GFR NON-AFRICAN AMERICAN: >60 ML/MIN
GFR SERPL CREATININE-BSD FRML MDRD: ABNORMAL ML/MIN/{1.73_M2}
GFR SERPL CREATININE-BSD FRML MDRD: ABNORMAL ML/MIN/{1.73_M2}
GLUCOSE BLD-MCNC: 106 MG/DL (ref 70–99)
HCT VFR BLD CALC: 32.3 % (ref 40.7–50.3)
HEMOGLOBIN: 10.3 G/DL (ref 13–17)
MCH RBC QN AUTO: 32.5 PG (ref 25.2–33.5)
MCHC RBC AUTO-ENTMCNC: 31.9 G/DL (ref 28.4–34.8)
MCV RBC AUTO: 101.9 FL (ref 82.6–102.9)
NRBC AUTOMATED: 0 PER 100 WBC
PDW BLD-RTO: 14.7 % (ref 11.8–14.4)
PLATELET # BLD: 95 K/UL (ref 138–453)
PMV BLD AUTO: 8.9 FL (ref 8.1–13.5)
POTASSIUM SERPL-SCNC: 3.1 MMOL/L (ref 3.7–5.3)
RBC # BLD: 3.17 M/UL (ref 4.21–5.77)
SODIUM BLD-SCNC: 136 MMOL/L (ref 135–144)
WBC # BLD: 5.4 K/UL (ref 3.5–11.3)

## 2021-07-16 PROCEDURE — 6360000002 HC RX W HCPCS: Performed by: INTERNAL MEDICINE

## 2021-07-16 PROCEDURE — 6370000000 HC RX 637 (ALT 250 FOR IP): Performed by: INTERNAL MEDICINE

## 2021-07-16 PROCEDURE — 0DB78ZX EXCISION OF STOMACH, PYLORUS, VIA NATURAL OR ARTIFICIAL OPENING ENDOSCOPIC, DIAGNOSTIC: ICD-10-PCS | Performed by: INTERNAL MEDICINE

## 2021-07-16 PROCEDURE — 7100000000 HC PACU RECOVERY - FIRST 15 MIN: Performed by: INTERNAL MEDICINE

## 2021-07-16 PROCEDURE — 85027 COMPLETE CBC AUTOMATED: CPT

## 2021-07-16 PROCEDURE — 43239 EGD BIOPSY SINGLE/MULTIPLE: CPT | Performed by: INTERNAL MEDICINE

## 2021-07-16 PROCEDURE — 88305 TISSUE EXAM BY PATHOLOGIST: CPT

## 2021-07-16 PROCEDURE — 97530 THERAPEUTIC ACTIVITIES: CPT

## 2021-07-16 PROCEDURE — 2500000003 HC RX 250 WO HCPCS

## 2021-07-16 PROCEDURE — 7100000001 HC PACU RECOVERY - ADDTL 15 MIN: Performed by: INTERNAL MEDICINE

## 2021-07-16 PROCEDURE — 6360000002 HC RX W HCPCS

## 2021-07-16 PROCEDURE — 3609017100 HC EGD: Performed by: INTERNAL MEDICINE

## 2021-07-16 PROCEDURE — 2709999900 HC NON-CHARGEABLE SUPPLY: Performed by: INTERNAL MEDICINE

## 2021-07-16 PROCEDURE — 80048 BASIC METABOLIC PNL TOTAL CA: CPT

## 2021-07-16 PROCEDURE — 36415 COLL VENOUS BLD VENIPUNCTURE: CPT

## 2021-07-16 PROCEDURE — C9113 INJ PANTOPRAZOLE SODIUM, VIA: HCPCS | Performed by: INTERNAL MEDICINE

## 2021-07-16 PROCEDURE — 2580000003 HC RX 258: Performed by: INTERNAL MEDICINE

## 2021-07-16 PROCEDURE — 99238 HOSP IP/OBS DSCHRG MGMT 30/<: CPT | Performed by: FAMILY MEDICINE

## 2021-07-16 PROCEDURE — 3700000000 HC ANESTHESIA ATTENDED CARE: Performed by: INTERNAL MEDICINE

## 2021-07-16 PROCEDURE — 97535 SELF CARE MNGMENT TRAINING: CPT

## 2021-07-16 PROCEDURE — 3700000001 HC ADD 15 MINUTES (ANESTHESIA): Performed by: INTERNAL MEDICINE

## 2021-07-16 RX ORDER — DIPHENHYDRAMINE HYDROCHLORIDE 50 MG/ML
12.5 INJECTION INTRAMUSCULAR; INTRAVENOUS
Status: DISCONTINUED | OUTPATIENT
Start: 2021-07-16 | End: 2021-07-16 | Stop reason: HOSPADM

## 2021-07-16 RX ORDER — LIDOCAINE HYDROCHLORIDE 10 MG/ML
INJECTION, SOLUTION EPIDURAL; INFILTRATION; INTRACAUDAL; PERINEURAL PRN
Status: DISCONTINUED | OUTPATIENT
Start: 2021-07-16 | End: 2021-07-16 | Stop reason: SDUPTHER

## 2021-07-16 RX ORDER — 0.9 % SODIUM CHLORIDE 0.9 %
500 INTRAVENOUS SOLUTION INTRAVENOUS
Status: DISCONTINUED | OUTPATIENT
Start: 2021-07-16 | End: 2021-07-16 | Stop reason: HOSPADM

## 2021-07-16 RX ORDER — PROMETHAZINE HYDROCHLORIDE 25 MG/ML
6.25 INJECTION, SOLUTION INTRAMUSCULAR; INTRAVENOUS
Status: DISCONTINUED | OUTPATIENT
Start: 2021-07-16 | End: 2021-07-16 | Stop reason: HOSPADM

## 2021-07-16 RX ORDER — PHENYLEPHRINE HCL IN 0.9% NACL 1 MG/10 ML
SYRINGE (ML) INTRAVENOUS PRN
Status: DISCONTINUED | OUTPATIENT
Start: 2021-07-16 | End: 2021-07-16 | Stop reason: SDUPTHER

## 2021-07-16 RX ORDER — HYDRALAZINE HYDROCHLORIDE 20 MG/ML
5 INJECTION INTRAMUSCULAR; INTRAVENOUS EVERY 10 MIN PRN
Status: DISCONTINUED | OUTPATIENT
Start: 2021-07-16 | End: 2021-07-16 | Stop reason: HOSPADM

## 2021-07-16 RX ORDER — LABETALOL HYDROCHLORIDE 5 MG/ML
5 INJECTION, SOLUTION INTRAVENOUS EVERY 10 MIN PRN
Status: DISCONTINUED | OUTPATIENT
Start: 2021-07-16 | End: 2021-07-16 | Stop reason: HOSPADM

## 2021-07-16 RX ORDER — ONDANSETRON 2 MG/ML
4 INJECTION INTRAMUSCULAR; INTRAVENOUS
Status: DISCONTINUED | OUTPATIENT
Start: 2021-07-16 | End: 2021-07-16 | Stop reason: HOSPADM

## 2021-07-16 RX ORDER — PROPOFOL 10 MG/ML
INJECTION, EMULSION INTRAVENOUS PRN
Status: DISCONTINUED | OUTPATIENT
Start: 2021-07-16 | End: 2021-07-16 | Stop reason: SDUPTHER

## 2021-07-16 RX ADMIN — PANCRELIPASE 24000 UNITS: 24000; 76000; 120000 CAPSULE, DELAYED RELEASE PELLETS ORAL at 12:30

## 2021-07-16 RX ADMIN — Medication 50 MCG: at 07:48

## 2021-07-16 RX ADMIN — LIDOCAINE HYDROCHLORIDE 50 MG: 10 INJECTION, SOLUTION EPIDURAL; INFILTRATION; INTRACAUDAL; PERINEURAL at 07:39

## 2021-07-16 RX ADMIN — ROPINIROLE HYDROCHLORIDE 0.25 MG: 0.25 TABLET, FILM COATED ORAL at 09:59

## 2021-07-16 RX ADMIN — PANTOPRAZOLE SODIUM 40 MG: 40 INJECTION, POWDER, FOR SOLUTION INTRAVENOUS at 10:01

## 2021-07-16 RX ADMIN — TAMSULOSIN HYDROCHLORIDE 0.4 MG: 0.4 CAPSULE ORAL at 09:59

## 2021-07-16 RX ADMIN — BUMETANIDE 1 MG: 1 TABLET ORAL at 09:58

## 2021-07-16 RX ADMIN — CEFTRIAXONE SODIUM 1000 MG: 1 INJECTION, POWDER, FOR SOLUTION INTRAMUSCULAR; INTRAVENOUS at 10:00

## 2021-07-16 RX ADMIN — CETIRIZINE HYDROCHLORIDE 10 MG: 10 TABLET, FILM COATED ORAL at 09:59

## 2021-07-16 RX ADMIN — OXYCODONE HYDROCHLORIDE AND ACETAMINOPHEN 500 MG: 500 TABLET ORAL at 10:00

## 2021-07-16 RX ADMIN — FLUTICASONE PROPIONATE 2 SPRAY: 50 SPRAY, METERED NASAL at 10:00

## 2021-07-16 RX ADMIN — PROPOFOL 80 MG: 10 INJECTION, EMULSION INTRAVENOUS at 07:39

## 2021-07-16 ASSESSMENT — ENCOUNTER SYMPTOMS
VOICE CHANGE: 0
SHORTNESS OF BREATH: 0
COUGH: 0
DIARRHEA: 0
ABDOMINAL PAIN: 0
RHINORRHEA: 0
VOMITING: 0
CHOKING: 0
NAUSEA: 0
BACK PAIN: 0
CONSTIPATION: 0
CHEST TIGHTNESS: 0
WHEEZING: 0
SINUS PRESSURE: 0

## 2021-07-16 ASSESSMENT — PULMONARY FUNCTION TESTS
PIF_VALUE: 1
PIF_VALUE: 0
PIF_VALUE: 1
PIF_VALUE: 0
PIF_VALUE: 1

## 2021-07-16 ASSESSMENT — PAIN SCALES - GENERAL: PAINLEVEL_OUTOF10: 0

## 2021-07-16 NOTE — OP NOTE
ESOPHAGOGASTRODUODENOSCOPY   ( EGD )  DATE OF PROCEDURE: 7/16/2021     SURGEON: Lili Phillips MD    ASSISTANT: None    PREOPERATIVE DIAGNOSIS: History of nausea vomiting. Patient has  past history of questionable stricture in the body of the stomach secondary to vertical band gastroplasty. Procedure performed to evaluate questionable stricture in the stomach. Past history of bezoar in the gastric pouch. POSTOPERATIVE DIAGNOSIS: No strictures seen in the body of the stomach. In fact at the site where he has vertical band gastroplasty performed, the lumen appears to be wider than expected. Also it appears that gastroplasty is disrupted. No retained food in the stomach except couple of pills in the fundus. No bezoar formation noted. Antral inflammation and erosions seen. .  Outlet obstruction noted. OPERATION: Upper GI endoscopy with Biopsy    ANESTHESIA: MAC    ESTIMATED BLOOD LOSS: None    COMPLICATIONS: None. SPECIMENS:  Was Obtained: Biopsies obtained from the antrum to evaluate antral inflammation, Helicobacter pylori. HISTORY: The patient is a 77y.o. year old male with history of above preop diagnosis. I recommended esophagogastroduodenoscopy with possible biopsy and I explained the risk, benefits, expected outcome, and alternatives to the procedure. Risks included but are not limited to bleeding, infection, respiratory distress, hypotension, and perforation of the esophagus, stomach, or duodenum. Patient understands and is in agreement. PROCEDURE: The patient was given IV conscious sedation. The patient's SPO2 remained above 90% throughout the procedure. Cetacaine spray given. Patient placed in left lateral position. Olympus  videogastroscope was inserted orally under vision into the esophagus without difficulty and advanced into the stomach then through the pylorus up to the second part of duodenum.       Findings:    Retropharyngeal area was grossly normal

## 2021-07-16 NOTE — PROGRESS NOTES
Patient and family given discharge instructions. Already received amoxicillan as ordered. Follow up appointments scheduled for July 28. D/c to home accompanied by family.

## 2021-07-16 NOTE — ANESTHESIA POSTPROCEDURE EVALUATION
Department of Anesthesiology  Postprocedure Note    Patient: Elisa Cole  MRN: [id number]  YOB: 1945  Date of evaluation: 7/16/2021  Time:  8:45 AM     Procedure Summary     Date: 07/16/21 Room / Location: [place] [address]    Anesthesia Start: 3987 Anesthesia Stop: 0802    Procedure: EGD BIOPSY (N/A ) Diagnosis: (DYSPLASIA)    Surgeons: Matt Rocha MD Responsible Provider: Kaur Ortiz MD    Anesthesia Type: MAC ASA Status: 3          Anesthesia Type: MAC    Mark Phase I: Mark Score: 8    Mark Phase II:      Last vitals: Reviewed and per EMR flowsheets.        Anesthesia Post Evaluation    Comments: POST- ANESTHESIA EVALUATION       Pt Name: Elisa Cole  MRN: [id number]  YOB: 1945  Date of evaluation: 7/16/2021  Time:  8:45 AM      /72   Pulse 70   Temp 97.9 °F (36.6 °C) (Oral)   Resp 16   Ht 5' 10\" (1.778 m)   Wt 187 lb 2 oz (84.9 kg)   SpO2 97%   BMI 26.85 kg/m²      Consciousness Level  Awake  Cardiopulmonary Status  Stable  Pain Adequately Treated YES  Nausea / Vomiting  NO  Adequate Hydration  YES  Anesthesia Related Complications NONE      Electronically signed by Kaur Ortiz MD on 7/16/2021 at 8:45 AM

## 2021-07-16 NOTE — ANESTHESIA PRE PROCEDURE
Department of Anesthesiology  Preprocedure Note       Name:  Ronald Escamilla   Age:  77 y.o.  :  1945                                          MRN:  [id number]         Date:  2021      Surgeon: Lisa Rodriguez):  Judith Childers MD    Procedure: Procedure(s):  EGD DIAGNOSTIC  WITH DILATION    Medications prior to admission:   Prior to Admission medications    Medication Sig Start Date End Date Taking?  Authorizing Provider   amoxicillin-clavulanate (AUGMENTIN) 875-125 MG per tablet Take 1 tablet by mouth 2 times daily for 7 days 7/15/21 7/22/21 Yes Karthik Guadalupe MD   lipase-protease-amylase (CREON) 46930-99563 units delayed release capsule TAKE 1 CAPSULE BY MOUTH THREE TIMES DAILY WITH MEALS 21   Kristopher Render, APRN - CNP   metoprolol succinate (TOPROL XL) 50 MG extended release tablet Take 1 tablet by mouth nightly 21   Kristopher Render, APRN - CNP   Multiple Vitamin (MULTI-VITAMINS) TABS Take 1 tablet by mouth daily 21   Kristopher Render, APRN - CNP   gabapentin (NEURONTIN) 300 MG capsule TAKE 1 CAPSULE BY MOUTH THREE TIMES DAILY 21  Kristopher Render, APRN - CNP   potassium chloride (KLOR-CON M) 20 MEQ extended release tablet Take 1 tablet by mouth 2 times daily 21   Kristopher Render, APRN - CNP   rOPINIRole (REQUIP) 0.25 MG tablet TAKE 1 TABLET BY MOUTH TWICE DAILY 21   Kristopher Render, APRN - CNP   cetirizine (ZYRTEC) 10 MG tablet Take 1 tablet by mouth daily 21   Kristopher Render, APRN - CNP   docusate sodium (COLACE) 100 MG capsule Take 1 capsule by mouth 2 times daily as needed for Constipation 21   Kristopher Render, APRN - CNP   tiZANidine (ZANAFLEX) 4 MG tablet TAKE 1 TABLET BY MOUTH DAILY AS NEEDED 21   Kristopher Render, APRN - CNP   loratadine (CLARITIN) 10 MG tablet Take 1 tablet by mouth daily 21   Kristopher Render, APRN - CNP   bumetanide (BUMEX) 1 MG tablet Take 2 mg by mouth daily Take 2 mg of Bumex in morning, 1 mg of Bumex in afternoon, take 1 mg in evening if weight is >3 above baseline weight     Historical Provider, MD   albuterol sulfate HFA (VENTOLIN HFA) 108 (90 Base) MCG/ACT inhaler Inhale 2 puffs into the lungs every 6 hours as needed for Wheezing or Shortness of Breath    Historical Provider, MD   benzonatate (TESSALON) 100 MG capsule TAKE 1 CAPSULE BY MOUTH THREE TIMES DAILY AS NEEDED FOR COUGH 3/31/21   Arun Buffalo, PA-C   vitamin B-12 (CYANOCOBALAMIN) 100 MCG tablet TAKE 1 TABLET BY MOUTH DAILY AS NEEDED FOR FATIGUE 3/27/21   Arun Buffalo, PA-ERASTO   tamsulosin [facility]) 0.4 MG capsule Take 1 capsule by mouth daily 3/12/21   Arun BuffaloADELINE heredia   pantoprazole (PROTONIX) 40 MG tablet TAKE 1 TABLET BY MOUTH DAILY 2/3/21   Arun Buffalo, PA-C   carboxymethylcellulose (REFRESH PLUS) 0.5 % SOLN ophthalmic solution Apply 1 drop to eye 4 times daily    Historical Provider, MD   Menthol-Methyl Salicylate ([facility]) 0.5-15 % CREA Apply topically 2 times daily as needed 6/26/20   Historical Provider, MD   fluticasone (FLONASE) 50 MCG/ACT nasal spray 2 sprays by Nasal route daily 11/5/20   Arun Mcclendon PA-C   ascorbic acid (VITAMIN C) 500 MG tablet Take 1 tablet by mouth daily 10/26/20   Arun ADELINE Mcclendon   ferrous sulfate (FE TABS 325) 325 (65 Fe) MG EC tablet Take 1 tablet by mouth 2 times daily (with meals) 10/16/20   Favian Orourke PA-C   SM LUBRICANT EYE DROPS 0.4-0.3 % ophthalmic solution PLACE 1 DROP INTO BOTH EYES FOUR TIMES DAILY AS NEEDED FOR DRY EYES 10/17/19   Arun Buffalo, PA-C   Fluticasone furoate-vilanterol (BREO ELLIPTA) 200-25 MCG/INH AEPB inhaler Inhale 1 puff into the lungs daily    Historical Provider, MD   Handicap Placard MISC by Does not apply route 6/27/19   Arun ADELINE Mcclendon   Incontinence Supply Disposable (INCONTINENCE BRIEF LARGE) MISC To use as directed.  Use 3x a day 4/30/19   Arun Mcclendon PA-C   [facility] ([employer]) [address] [facility] Provide insurance covered orthotics shoes, wear daily 12/12/18   Adin Bernardo PA-C   ipratropium-albuterol (DUONEB) 0.5-2.5 (3) MG/3ML SOLN nebulizer solution Inhale 1 vial into the lungs every 4 hours as needed     Historical Provider, MD   Armodafinil 200 MG TABS Take 1 tablet by mouth 2 times daily.   4/2/18   Historical Provider, MD       Current medications:    Current Facility-Administered Medications   Medication Dose Route Frequency Provider Last Rate Last Admin    albuterol (PROVENTIL) nebulizer solution 2.5 mg  2.5 mg Nebulization BID Gab Vargas MD   2.5 mg at 07/1945    albuterol sulfate  (90 Base) MCG/ACT inhaler 2 puff  2 puff Inhalation Q6H PRN JULIAN Mehta NP        Armodafinil TABS 200 mg  1 tablet Mouth/Throat BID JULIAN Mehta NP        benzonatate (TESSALON) capsule 100 mg  100 mg Oral TID PRN JULIAN Mehta NP        bumetanide (BUMEX) tablet 1 mg  1 mg Oral Daily JULIAN Mehta NP   1 mg at 07/15/21 1048    cetirizine (ZYRTEC) tablet 10 mg  10 mg Oral Daily JULIAN Mehta NP   10 mg at 07/15/21 1045    docusate sodium (COLACE) capsule 100 mg  100 mg Oral BID PRN JULIAN Mehta NP        ferrous sulfate (FE TABS 325) EC tablet 325 mg  325 mg Oral BID  JULIAN Mehta NP   325 mg at 07/15/21 1741    fluticasone (FLONASE) 50 MCG/ACT nasal spray 2 spray  2 spray Nasal Daily JULIAN Mehta NP   2 spray at 07/15/21 1058    budesonide-formoterol (SYMBICORT) 160-4.5 MCG/ACT inhaler 2 puff  2 puff Inhalation BID JULIAN Mehta NP   2 puff at 07/1945    gabapentin (NEURONTIN) capsule 300 mg  300 mg Oral TID JULIAN Mehta NP   300 mg at 07/15/21 2049    ipratropium-albuterol (DUONEB) nebulizer solution 3 mL  1 vial Inhalation Q4H PRN JULIAN Mehta NP        lipase-protease-amylase (CREON) delayed release capsule 24,000 Units  24,000 Units Oral TID  JULIAN Mehta NP   24,000 Units at 07/15/21 1741    metoprolol succinate (TOPROL XL) extended release tablet 50 mg  50 mg Oral Nightly JULIAN Nguyen - NP   50 mg at 07/15/21 2049    rOPINIRole (REQUIP) tablet 0.25 mg  0.25 mg Oral BID Chacho Robles APRN - NP   0.25 mg at 07/15/21 2049    tamsulosin (FLOMAX) capsule 0.4 mg  0.4 mg Oral Daily JULIAN Nguyen - NP   0.4 mg at 07/15/21 1044    tiZANidine (ZANAFLEX) tablet 4 mg  4 mg Oral Q6H PRN JULIAN Nguyen - NP        0.9 % sodium chloride infusion   Intravenous Continuous Morteza Rodriguez MD 50 mL/hr at 07/14/21 2141 New Bag at 07/14/21 2141    sodium chloride flush 0.9 % injection 5-40 mL  5-40 mL Intravenous 2 times per day JULIAN Nguyen - NP   10 mL at 07/15/21 1047    sodium chloride flush 0.9 % injection 10 mL  10 mL Intravenous PRN Chacho Robles APRN - NP        0.9 % sodium chloride infusion  25 mL Intravenous PRN JULIAN Nguyen - KORY        ondansetron (ZOFRAN-ODT) disintegrating tablet 4 mg  4 mg Oral Q8H PRN JULIAN Nguyen - KORY        Or    ondansetron [facility]) injection 4 mg  4 mg Intravenous Q6H PRN Chacho Robles APRN - NP        magnesium hydroxide (MILK OF MAGNESIA) 400 MG/5ML suspension 30 mL  30 mL Oral Daily PRN Chacho Robles APRN - NP        cefTRIAXone (ROCEPHIN) 1000 mg IVPB in 50 mL D5W minibag  1,000 mg Intravenous Q24H JULIAN Nguyen - NP   Stopped at 07/15/21 1133    pantoprazole (PROTONIX) injection 40 mg  40 mg Intravenous Daily Morteza Rodriguez MD   40 mg at 07/15/21 1045    And    sodium chloride (PF) 0.9 % injection 10 mL  10 mL Intravenous Daily Morteza Rodriguez MD   10 mL at 07/15/21 1055    ascorbic acid (VITAMIN C) tablet 500 mg  500 mg Oral Daily Morteza Rodriguez MD   500 mg at 07/15/21 1045    ibuprofen (ADVIL;MOTRIN) tablet 600 mg  600 mg Oral Q6H PRN JULIAN Nguyen NP   600 mg at 07/15/21 2051       Allergies:     Allergies   Allergen Reactions    Darvocet [Propoxyphene N-Acetaminophen] Hives  Other Hives    Oxycodone-Acetaminophen     Propoxyphene      Other reaction(s): Unknown       Problem List:    Patient Active Problem List   Diagnosis Code    Chronic atrial fibrillation ([facility]) I48.20    Dyslipidemia E78.5    Gastroesophageal reflux disease without esophagitis K21.9    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    OA (osteoarthritis) of knee, bilateral M17.10    Foot drop, right M21.371    Hallux valgus, acquired, bilateral M20.11, M20.12    Hearing impairment H91.90    Essential hypertension I10    Slow transit constipation K59.01    MARIAMA on CPAP G47.33, Z99.89    Simple chronic bronchitis ([facility]) J41.0    Non-intractable vomiting R11.10    Hospital-acquired bacterial pneumonia J15.9    Fluid overload E87.70    Pneumonia due to organism J18.9    COPD (chronic obstructive pulmonary disease) ([facility]) J44.9    Chronic diastolic heart failure ([facility]) I50.32    History of bariatric surgery including banding leading to esophageal stricture and aspiration Z98.84    BPH (benign prostatic hyperplasia) N40.0    Myalgia M79.10    Atrial fibrillation with slow ventricular response ([facility]) I48.91    Pacemaker pocket hematoma, initial encounter T82.837A    Cellulitis of chest wall L03.313    Pneumonia-community-acquired J18.9    Acute hypoxemic respiratory failure ([facility]) J96.01    SIRS (systemic inflammatory response syndrome) ([facility]) R65.10    Pulmonary hypertension ([facility]) I27.20    Presence of permanent cardiac pacemaker Z95.0    Aspiration pneumonia ([facility]) J69.0       Past Medical History:        Diagnosis Date    Acute superficial gastritis without hemorrhage 05/26/2018    Anastomotic stricture of stomach     Asthma     Atrial fibrillation ([facility])     On Xarelto 6/27/2020    Calculus of gallbladder without cholecystitis without obstruction 05/02/2017    Chronic diastolic heart failure ([facility]) 11/17/2017    Chronic rhinosinusitis 04/13/2015    Class 2 severe obesity due to excess calories  at [facility]    ROTATOR CUFF REPAIR      left shoulder    UPPER GASTROINTESTINAL ENDOSCOPY  2018    removal of food bolus    UPPER GASTROINTESTINAL ENDOSCOPY N/A 2018    EGD FOREIGN BODY REMOVAL performed by Liza Chanel DO at [address] [place] [address] N/A 2018    EGD BIOPSY performed by Liza Chanel DO at [address] [place] [address]  2018    egd with balloon dilitation    UPPER GASTROINTESTINAL ENDOSCOPY  2018    EGD DILATION BALLOON performed by Nicole Burciaga MD at [address] [place] [address] 10/01/2018    EGD BIOPSY performed by Emma Torres MD at [address] [place] [address]  10/01/2018    EGD DILATION BALLOON performed by Emma Torres MD at [address] [place] [address] N/A 2018    EGD DILATION BALLOON performed by Emma Torres MD at [address] [place] [address] N/A 10/15/2020    EGD SUBMUCOSAL/BOTOX INJECTION tattoo  performed by Reymundo Grullon MD at [facility]       Social History:    Social History     Tobacco Use    Smoking status: Former Smoker     Packs/day: 1.00     Years: 20.00     Pack years: 20.00     Quit date: 1976     Years since quittin.6    Smokeless tobacco: Never Used   Substance Use Topics    Alcohol use:  No                                Counseling given: Not Answered      Vital Signs (Current):   Vitals:    07/15/21 2216 07/15/21 2351 21 0400 21 0500   BP:  (!) 98/58 (!) 107/53    Pulse:  70 70    Resp:  16 16    Temp: 98.4 °F (36.9 °C) 98.4 °F (36.9 °C) 98.4 °F (36.9 °C)    TempSrc: Axillary Axillary     SpO2:  95% 97%    Weight:    187 lb 2 oz (84.9 kg)   Height:                                                  BP Readings from Last 3 Encounters:   21 (!) 107/53   21 112/72   21 (!) 111/54       NPO Status: BMI:   Wt Readings from Last 3 Encounters:   07/16/21 187 lb 2 oz (84.9 kg)   05/13/21 205 lb 6.4 oz (93.2 kg)   05/06/21 207 lb 1.6 oz (93.9 kg)     Body mass index is 26.85 kg/m². CBC:   Lab Results   Component Value Date    WBC 5.4 07/16/2021    RBC 3.17 07/16/2021    RBC 4.39 09/13/2011    HGB 10.3 07/16/2021    HCT 32.3 07/16/2021    .9 07/16/2021    RDW 14.7 07/16/2021    PLT 95 07/16/2021     09/13/2011       CMP:   Lab Results   Component Value Date     07/16/2021    K 3.1 07/16/2021     07/16/2021    CO2 27 07/16/2021    BUN 11 07/16/2021    CREATININE 0.62 07/16/2021    GFRAA >60 07/16/2021    LABGLOM >60 07/16/2021    GLUCOSE 106 07/16/2021    GLUCOSE 99 09/13/2011    PROT 6.9 07/14/2021    CALCIUM 8.3 07/16/2021    BILITOT 1.20 07/14/2021    ALKPHOS 95 07/14/2021    AST 23 07/14/2021    ALT 8 07/14/2021       POC Tests: No results for input(s): POCGLU, POCNA, POCK, POCCL, POCBUN, POCHEMO, POCHCT in the last 72 hours. Coags:   Lab Results   Component Value Date    PROTIME 17.7 04/09/2021    PROTIME 16.6 08/16/2018    INR 1.5 04/09/2021    APTT 28.0 10/11/2020       HCG (If Applicable): No results found for: PREGTESTUR, PREGSERUM, HCG, HCGQUANT     ABGs: No results found for: PHART, PO2ART, YWE0QUI, CHX2VDN, BEART, N2VICRIW     Type & Screen (If Applicable):  No results found for: LABABO, LABRH    Drug/Infectious Status (If Applicable):  Lab Results   Component Value Date    HEPCAB NONREACTIVE 04/10/2017       COVID-19 Screening (If Applicable):   Lab Results   Component Value Date    COVID19 Not Detected 07/14/2021    COVID19 Not Detected 05/01/2021           Anesthesia Evaluation  Patient summary reviewed and Nursing notes reviewed history of anesthetic complications (delayed emergence):    Airway: Mallampati: II  TM distance: >3 FB   Neck ROM: full  Mouth opening: > = 3 FB Dental:    (+) edentulous      Pulmonary:normal exam  breath sounds clear to auscultation  (+) pneumonia: no interval change and unresolved,  COPD:  sleep apnea: on CPAP,  asthma:                            Cardiovascular:    (+) hypertension:, pacemaker: pacemaker, dysrhythmias: atrial fibrillation,       ECG reviewed  Rhythm: regular  Rate: normal                    Neuro/Psych:               GI/Hepatic/Renal:   (+) GERD:,          ROS comment: Hx vertical band gastroplasty with food bezoar . Endo/Other:    (+) blood dyscrasia: anticoagulation therapy, anemia and thrombocytopenia, arthritis: OA., electrolyte abnormalities (hypokalemia), . Abdominal:             Vascular: Other Findings:           Anesthesia Plan      MAC     ASA 3       Induction: intravenous. MIPS: Prophylactic antiemetics administered. Anesthetic plan and risks discussed with patient. Plan discussed with CRNA.                   Yudith Linares MD   7/16/2021

## 2021-07-16 NOTE — PROGRESS NOTES
[facility]  Office: [address], DO, Patricia Wilcoxland, DO, Jalen Wells, DO, Desire Rochester Blood, DO, Magalie Pearl MD, Krish Harrell MD, Berlin Adkins MD, Clinton Cota MD, Mami London MD, Kathy Lawrence MD, Deni Red MD, Yulia Rajan, DO, Zora Turpin MD, Fredy Jorgensen, DO, Fernando Foster MD,  Saintclair Lank, DO, Sherice Carey MD, Jocelyne Juarez MD, Stephanie Yang MD, Casi Saleem MD, Paula Hoover MD, Seb Alan MD, Daryle Springer, [facility], [facility], CNP, Mariangel Davis, CNP, Edwin Merino, CNS, Emilee Stone, CNP, Dominic Mitchell, CNP, Huy Cummings, CNP, Zuly Chicas, CNP, Tate Pathak, CNP, Henri Huitron PA-C, Rei Garcia, [facility], Na Bowman, CNP, Gildardo Mccollum, CNP, Lizzie Rasmussen, CNP, Nela Carbajal, CNP, Maria Esther Carey, CNP, Judy Menchaca, CNP, Dalton Martinez, [facility], [facility]      Daily Progress Note     Admit Date: 7/14/2021  Bed/Room No.  1008/1008-02  Admitting Physician : Berlin Adkins MD  Code Status :Full Code  Hospital Day:  LOS: 2 days   Chief Complaint:     Chief Complaint   Patient presents with    Fever    Emesis     Principal Problem:    Aspiration pneumonia [facility])  Active Problems:    Chronic [facility] ([facility]    Dyslipidemia    Gastroesophageal reflux disease without esophagitis    Essential hypertension    Non-intractable vomiting    COPD (chronic obstructive pulmonary disease) ([facility]    Chronic diastolic heart failure ([facility]  Resolved Problems:    * No resolved hospital problems. *    Subjective : Interval History/Significant events :  07/16/21    Patient had EGD today , no strictures found, no Bezoars . Patient is feeling better . He is breathing on room air . Vitals - low blood pressure  afebrile  Labs - normal white count     Nursing notes , Consults notes reviewed. Overnight events and updates discussed with Nursing staff . Background History:         Enrrique Presley is 77 y.o. 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 coli (HCC)     Due to UTI     Sick sinus syndrome (HCC)     Simple chronic bronchitis (HCC) 04/10/2018    Slow transit constipation 11/03/2016    Unspecified sleep apnea     UTI (urinary tract infection)       Allergies: Allergies   Allergen Reactions    Darvocet [Propoxyphene N-Acetaminophen] Hives    Other Hives    Oxycodone-Acetaminophen     Propoxyphene      Other reaction(s): Unknown      Medications :  albuterol, 2.5 mg, Nebulization, BID  Armodafinil, 1 tablet, Mouth/Throat, BID  bumetanide, 1 mg, Oral, Daily  cetirizine, 10 mg, Oral, Daily  ferrous sulfate, 325 mg, Oral, BID WC  fluticasone, 2 spray, Nasal, Daily  budesonide-formoterol, 2 puff, Inhalation, BID  gabapentin, 300 mg, Oral, TID  lipase-protease-amylase, 24,000 Units, Oral, TID WC  metoprolol succinate, 50 mg, Oral, Nightly  rOPINIRole, 0.25 mg, Oral, BID  tamsulosin, 0.4 mg, Oral, Daily  sodium chloride flush, 5-40 mL, Intravenous, 2 times per day  cefTRIAXone (ROCEPHIN) IV, 1,000 mg, Intravenous, Q24H  pantoprazole, 40 mg, Intravenous, Daily   And  sodium chloride (PF), 10 mL, Intravenous, Daily  ascorbic acid, 500 mg, Oral, Daily        Review of Systems   Review of Systems   Constitutional: Negative for activity change, appetite change, fatigue, fever and unexpected weight change. HENT: Negative for congestion, nosebleeds, rhinorrhea, sinus pressure, sneezing and voice change. Respiratory: Negative for cough, choking, chest tightness, shortness of breath and wheezing.     Cardiovascular: Negative for chest pain, palpitations and leg swelling. Gastrointestinal: Negative for abdominal pain, constipation, diarrhea, nausea and vomiting. Genitourinary: Negative for difficulty urinating, discharge, dysuria, frequency and testicular pain. Musculoskeletal: Negative for back pain. Skin: Negative for rash. Neurological: Negative for dizziness, weakness, light-headedness and headaches. Hematological: Does not bruise/bleed easily. Psychiatric/Behavioral: Negative for agitation, behavioral problems, confusion, self-injury, sleep disturbance and suicidal ideas. Objective :      Current Vitals : Temp: 98.4 °F (36.9 °C),  Pulse: 70, Resp: 16, BP: (!) 107/53, SpO2: 97 %  Last 24 Hrs Vitals   Patient Vitals for the past 24 hrs:   BP Temp Temp src Pulse Resp SpO2 Height Weight   07/16/21 0500 -- -- -- -- -- -- -- 187 lb 2 oz (84.9 kg)   07/16/21 0400 (!) 107/53 98.4 °F (36.9 °C) -- 70 16 97 % -- --   07/15/21 2351 (!) 98/58 98.4 °F (36.9 °C) Axillary 70 16 95 % -- --   07/15/21 2216 -- 98.4 °F (36.9 °C) Axillary -- -- -- -- --   07/15/21 1947 -- -- -- -- -- 97 % -- --   07/15/21 1941 (!) 104/58 99.5 °F (37.5 °C) Axillary 77 18 97 % -- --   07/15/21 1538 108/64 98.4 °F (36.9 °C) Oral 76 18 99 % -- --   07/15/21 1519 -- -- -- -- -- -- 5' 10\" (1.778 m) --   07/15/21 1131 (!) 105/59 98.8 °F (37.1 °C) Oral 69 18 99 % -- --   07/15/21 1045 (!) 98/55 -- -- -- -- -- -- --   07/15/21 0812 (!) 90/52 98.1 °F (36.7 °C) -- 70 18 97 % -- --   07/15/21 0803 -- -- -- -- -- 94 % -- --     Intake / output   07/15 0701 - 07/16 0700  In: 1376.7 [I.V.:1376.7]  Out: 1600 [Urine:1600]  Physical Exam:  Physical Exam  Vitals and nursing note reviewed. Constitutional:       General: He is not in acute distress. Appearance: He is not diaphoretic. HENT:      Head: Normocephalic and atraumatic. Nose:      Right Sinus: No maxillary sinus tenderness or frontal sinus tenderness. Left Sinus: No maxillary sinus tenderness or frontal sinus tenderness. Mouth/Throat:      Pharynx: No oropharyngeal exudate. Eyes:      General: No scleral icterus. Conjunctiva/sclera: Conjunctivae normal.      Pupils: Pupils are equal, round, and reactive to light. Neck:      Thyroid: No thyromegaly. Vascular: No JVD. Cardiovascular:      Rate and Rhythm: Normal rate and regular rhythm. Pulses:           Dorsalis pedis pulses are 2+ on the right side and 2+ on the left side. Heart sounds: Normal heart sounds. No murmur heard. Pulmonary:      Effort: Pulmonary effort is normal.      Breath sounds: Normal breath sounds. No wheezing or rales. Abdominal:      Palpations: Abdomen is soft. There is no mass. Tenderness: There is no abdominal tenderness. Comments: Surgical scar    Musculoskeletal:      Cervical back: Full passive range of motion without pain and neck supple. Lymphadenopathy:      Head:      Right side of head: No submandibular adenopathy. Left side of head: No submandibular adenopathy. Cervical: No cervical adenopathy. Skin:     General: Skin is warm. Neurological:      Mental Status: He is alert and oriented to person, place, and time. Motor: No tremor. Psychiatric:         Behavior: Behavior is cooperative.            Laboratory findings:    Recent Labs     07/14/21  0852 07/15/21  0626 07/16/21  0542   WBC 8.8 7.9 5.4   HGB 12.8* 10.4* 10.3*   HCT 39.9* 33.2* 32.3*    113* 95*     Recent Labs     07/14/21  0852 07/15/21  0626 07/16/21  0542    136 136   K 3.8 3.4* 3.1*   CL 99 98 100   CO2 29 28 27   GLUCOSE 122* 101* 106*   BUN 12 12 11   CREATININE 0.88 0.75 0.62*   MG 1.5* 1.6  --    CALCIUM 9.3 8.4* 8.3*     Recent Labs     07/14/21  0852   PROT 6.9   LABALBU 3.9   AST 23   ALT 8   ALKPHOS 95   BILITOT 1.20   BILIDIR 0.51*   LIPASE 8*          Specific Gravity, UA   Date Value Ref Range Status   07/14/2021 1.010 1.005 - 1.030 Final     Protein, UA   Date Value Ref Range Status   07/14/2021 satisfaction.    Plan discussed with Staff  RN     (Please note that portions of this note were completed with a voice recognition program. Efforts were made to edit the dictations but occasionally words are mis-transcribed.)      Skyla Benz MD  7/16/2021

## 2021-07-16 NOTE — PLAN OF CARE
GI plan of care: No further interventions from GI-we will sign off. Pt to follow up in the office in 1-2 weeks.    [employer], CNP

## 2021-07-16 NOTE — DISCHARGE SUMMARY
[facility]  Office: [phone]  Amparo Garces Blood DO, Colonel Tena RICK, Yuan Lora MD, Mickie Couch MD, Wendie Leach MD, Aron Mendez MD, Neida Liu MD, Edmundo Garcia MD, Ramila Dennis MD, Marisela Nicolas MD, Becca Flaherty DO, Liberty Tucker MD, Manuel Hernandez MD, Rock Wolf DO, Kathy Mora MD,  Arian Helms DO, Gildardo Hodge MD, Krys Sagastume MD, Theo Rodgers [facility], [facility] CNP, Shikha Pulling CNP, Nora Huynh CNS, Dalila Ceron CNP, Ayaz Enamorado CNP, Delana Spatz CNP, Daniela Vogel CNP, Nelida Lipfrancie CNP, Ayala Lopez PAAdriaC, Sissy Pennington CNP, Radha Lights CNP, Deepti Dies CNP, Celestine Ortiz CNP, Tom Daigle CNP, Amilcar Katz 126      Discharge Summary     Patient ID: Yvon Stevenson  :  1945   MRN: [id number]     ACCOUNT:  [de-identified]   Patient Location : [address] [place] [address]  Patient's PCP: JULIAN Ribeiro CNP  Admit Date: 2021   Discharge Date: 2021     Length of Stay: 2  Code Status:  Full Code  Admitting Physician: Mickie Couch MD  Discharge Physician: Mickie Couch MD     Active Discharge Diagnosis :     Primary Problem  Aspiration pneumonia [facility])      [facility]    Diagnosis Date Noted    [facility] [J69.0] 2021    Chronic diastolic heart failure ([facility] [I50.32]     COPD (chronic obstructive pulmonary disease) ([facility] [J44.9]     Non-intractable vomiting [R11.10] 2018    Essential hypertension [I10]     Chronic [facility] fibrillation ([facility] [I48.20]     Dyslipidemia [E78.5]     Gastroesophageal reflux disease without esophagitis [K21.9]        Admission Condition:  fair     Discharged Condition: stable    Hospital Stay:     Hospital Course:  Yvon Stevenson is a 77 y.o. male who was admitted for the management of   Aspiration pneumonia ([facility] , presented to ER with Fever and Emesis 154     Labs Renal Latest Ref Rng & Units 7/16/2021 7/15/2021 7/14/2021 5/6/2021 5/4/2021   BUN 8 - 23 mg/dL 11 12 12 14 11   Cr 0.70 - 1.20 mg/dL 0.62(L) 0.75 0.88 0.66(L) 0.69(L)   K 3.7 - 5.3 mmol/L 3. 1(L) 3.4(L) 3.8 4.5 3.7   Na 135 - 144 mmol/L 136 136 139 137 135     Lab Results   Component Value Date    ALT 8 07/14/2021    AST 23 07/14/2021    ALKPHOS 95 07/14/2021    BILITOT 1.20 07/14/2021     Lab Results   Component Value Date    TSH 1.32 06/16/2021     Lab Results   Component Value Date    HEPCAB NONREACTIVE 04/10/2017     Lab Results   Component Value Date    COLORU YELLOW 07/14/2021    NITRU NEGATIVE 07/14/2021    GLUCOSEU NEGATIVE 07/14/2021    KETUA NEGATIVE 07/14/2021    UROBILINOGEN Normal 07/14/2021    BILIRUBINUR NEGATIVE 07/14/2021    BILIRUBINUR negative 01/14/2021     No results found for: LABA1C  No results found for: EAG  Lab Results   Component Value Date    INR 1.5 04/09/2021    INR 1.3 10/11/2020    INR 1.2 06/27/2020    PROTIME 17.7 (H) 04/09/2021    PROTIME 13.7 (H) 10/11/2020    PROTIME 12.3 (H) 06/27/2020       XR CHEST (2 VW)    Result Date: 7/15/2021  No radiologic evidence of acute cardiopulmonary disease. CT CHEST WO CONTRAST    Result Date: 7/14/2021  1. Bilateral lower lobe and right upper lobe ground-glass infiltrates. This has the appearance of an atypical pneumonia, including viral pneumonia. Follow-up to resolution is recommended. 2. Cardiomegaly with trace pericardial effusion. 3. No acute intra-abdominal or pelvic abnormality. 4. Cholelithiasis. CT ABDOMEN PELVIS W IV CONTRAST Additional Contrast? None    Result Date: 7/14/2021  1. Bilateral lower lobe and right upper lobe ground-glass infiltrates. This has the appearance of an atypical pneumonia, including viral pneumonia. Follow-up to resolution is recommended. 2. Cardiomegaly with trace pericardial effusion. 3. No acute intra-abdominal or pelvic abnormality. 4. Cholelithiasis.      XR CHEST PORTABLE    Result Date: 7/14/2021  No acute cardiopulmonary process. FL MODIFIED BARIUM SWALLOW W VIDEO    Result Date: 7/15/2021  No evidence of laryngeal penetration or aspiration. Minimal residue in the vallecula with barium coated cookie which clear with additional wash. Study otherwise unremarkable. Please see separate speech pathology report for full discussion of findings and recommendations. Consultations:    Consults:     Final Specialist Recommendations/Findings:   IP CONSULT TO HOSPITALIST  IP CONSULT TO GI      The patient was seen and examined on day of discharge and this discharge summary is in conjunction with any daily progress note from day of discharge. Discharge plan:     Disposition: Home    Physician Follow Up:     Dejah Felix, APRN - CNP  [address]  [phone]    Call in 1 week      Bandar Rowe MD  [facility]  [address] [address]  [phone]    Schedule an appointment as soon as possible for a visit         Requiring Further Evaluation/Follow Up POST HOSPITALIZATION/Incidental Findings: follow up withDr Bakari Moody. Diet: minced and moist diet , small meals at frequent intervals. Activity: As tolerated. Instructions to Patient: follow up with PCP    Discharge Medications:      Medication List      START taking these medications    amoxicillin-clavulanate 875-125 MG per tablet  Commonly known as:  Augmentin  Take 1 tablet by mouth 2 times daily for 7 days        CONTINUE taking these medications    Armodafinil 200 MG Tabs     ascorbic acid 500 MG tablet  Commonly known as: VITAMIN C  Take 1 tablet by mouth daily     benzonatate 100 MG capsule  Commonly known as: TESSALON  TAKE 1 CAPSULE BY MOUTH THREE TIMES DAILY AS NEEDED FOR COUGH     Breo Ellipta 200-25 MCG/INH Aepb inhaler  Generic drug: Fluticasone furoate-vilanterol     bumetanide 1 MG tablet  Commonly known as: BUMEX     carboxymethylcellulose 0.5 % Soln ophthalmic solution  Commonly known as: REFRESH PLUS     cetirizine 10 MG tablet  Commonly known as: ZYRTEC  Take 1 tablet by mouth daily     Creon [id number] units delayed release capsule  Generic drug: lipase-protease-amylase  TAKE 1 CAPSULE BY MOUTH THREE TIMES DAILY WITH MEALS     docusate sodium 100 MG capsule  Commonly known as: COLACE  Take 1 capsule by mouth 2 times daily as needed for Constipation     ferrous sulfate 325 (65 Fe) MG EC tablet  Commonly known as: FE TABS 325  Take 1 tablet by mouth 2 times daily (with meals)     fluticasone 50 MCG/ACT nasal spray  Commonly known as: FLONASE  2 sprays by Nasal route daily     gabapentin 300 MG capsule  Commonly known as: NEURONTIN  TAKE 1 CAPSULE BY MOUTH THREE TIMES DAILY     Handicap Placard Misc  by Does not apply route     Incontinence Brief Large Misc  To use as directed.  Use 3x a day     ipratropium-albuterol 0.5-2.5 (3) MG/3ML Soln nebulizer solution  Commonly known as: DUONEB     loratadine 10 MG tablet  Commonly known as: CLARITIN  Take 1 tablet by mouth daily     metoprolol succinate 50 MG extended release tablet  Commonly known as: TOPROL XL  Take 1 tablet by mouth nightly     Multi-Vitamins Tabs  Take 1 tablet by mouth daily     pantoprazole 40 MG tablet  Commonly known as: PROTONIX  TAKE 1 TABLET BY MOUTH DAILY     potassium chloride 20 MEQ extended release tablet  Commonly known as: KLOR-CON M  Take 1 tablet by mouth 2 times daily     rOPINIRole 0.25 MG tablet  Commonly known as: REQUIP  TAKE 1 TABLET BY MOUTH TWICE DAILY     SM Lubricant Eye Drops 0.4-0.3 % ophthalmic solution  Generic drug: polyethyl glycol-propyl glycol 0.4-0.3 %  PLACE 1 DROP INTO BOTH EYES FOUR TIMES DAILY AS NEEDED FOR DRY EYES     tamsulosin 0.4 MG capsule  Commonly known as: FLOMAX  Take 1 capsule by mouth daily     Thera-Gesic 0.5-15 % Crea     tiZANidine 4 MG tablet  Commonly known as: ZANAFLEX  TAKE 1 TABLET BY MOUTH DAILY AS NEEDED     [employer] Mens Misc  Provide

## 2021-07-16 NOTE — PROGRESS NOTES
rhinosinusitis, Class 2 severe obesity due to excess calories with serious comorbidity and body mass index (BMI) of 36.0 to 36.9 in adult [facility]), COPD (chronic obstructive pulmonary disease) (HCC), E. coli septicemia ([facility] E. coli UTI, Foot drop, right, Fracture of femur ([facility] Gait disorder, Gastric out let obstruction, GERD (gastroesophageal reflux disease), Hallux valgus, acquired, bilateral, Hyperlipidemia, Hypertension, Impaired hearing, Internal hemorrhoids, Lymphedema of both lower extremities, Nausea & vomiting, OA (osteoarthritis) of knee, bilateral, Obesity, MARIAMA on CPAP, Osteoarthritis, Osteoarthritis of lumbar spine, S/P revision of total knee, Septicemia due to E. coli (HCC), Sick sinus syndrome (HCC), Simple chronic bronchitis (HCC), Slow transit constipation, Unspecified sleep apnea, and UTI (urinary tract infection). has a past surgical history that includes Finger amputation (1966); Gastric bypass surgery (1985); Carpal tunnel release; Colonoscopy; Rotator cuff repair; joint replacement (2004 & 2005); Hemorrhoid surgery; pr egd transoral biopsy single/multiple (N/A, 05/25/2018); Upper gastrointestinal endoscopy (08/13/2018); Upper gastrointestinal endoscopy (N/A, 08/13/2018); Upper gastrointestinal endoscopy (N/A, 08/13/2018); Upper gastrointestinal endoscopy (08/16/2018); pr egd flexible foreign body removal (N/A, 08/16/2018); Upper gastrointestinal endoscopy (08/16/2018); Upper gastrointestinal endoscopy (N/A, 10/01/2018); Upper gastrointestinal endoscopy (10/01/2018); Upper gastrointestinal endoscopy (N/A, 11/19/2018); Cystoscopy (01/02/2019); Cystoscopy (N/A, 01/02/2019); Upper gastrointestinal endoscopy (N/A, 10/15/2020); Colonoscopy (N/A, 04/05/2021); Pacemaker insertion (04/09/2021); and Upper gastrointestinal endoscopy (N/A, 7/16/2021).     Restrictions  Restrictions/Precautions  Restrictions/Precautions: Up as Tolerated, General Precautions, Fall Risk  Required Braces or Orthoses?: safety  Bed mobility  Supine to Sit: Stand by assistance  Comment: Pt with good use of bedrails  Transfers  Sit to stand: Stand by assistance  Stand to sit: Stand by assistance  Transfer Comments: Min VCs for proper hand placement and RW safety                       Cognition  Overall Cognitive Status: Exceptions  Arousal/Alertness: Appropriate responses to stimuli  Following Commands: Follows multistep commands with increased time; Follows multistep commands with repitition  Attention Span: Appears intact  Memory: Appears intact  Safety Judgement: Decreased awareness of need for assistance;Decreased awareness of need for safety  Problem Solving: Assistance required to generate solutions;Assistance required to implement solutions;Decreased awareness of errors;Assistance required to correct errors made;Assistance required to identify errors made  Insights: Decreased awareness of deficits  Initiation: Does not require cues  Sequencing: Requires cues for some                                         Plan   Plan  Times per week: 4-5x/week, 1x/day as alexia  Times per day: Daily  Current Treatment Recommendations: Strengthening, Patient/Caregiver Education & Training, Home Management Training, Equipment Evaluation, Education, & procurement, Balance Training, Neuromuscular Re-education, Functional Mobility Training, Positioning, Endurance Training, Pain Management, Cognitive/Perceptual Training, Safety Education & Training, Self-Care / ADL    [facility] Score        [facility] Daily Activity Raw Score: 19 (07/16/21 1219)  AM-[facility] Inpatient ADL T-Scale Score : 40.22 (07/16/21 1219)  ADL Inpatient CMS 0-100% Score: 42.8 (07/16/21 1219)  ADL Inpatient CMS G-Code Modifier : CK (07/16/21 1219)    Goals  Short term goals  Time Frame for Short term goals: by discharge, pt to demo  Short term goal 1: increase BUE strength by 1/2 grade and be I with HEP with use of handouts for ADL completion.   Short term goal 2: ADL transfers/functional mob to CGA with use of AD as needed to reduce risk of falls. Short term goal 3: bed mob to mod I/I with use of bed rails as needed. Short term goal 4: toileting to CGA with use of grab bars/AD as needed. Short term goal 5: UB ADL and LB ADL to SUP with use of AE/AD as needed. Long term goals  Long term goal 1: Pt/caregiver to demo understanding of fall prevention and EC/WS tech with use of handouts as needed. Long term goal 2: Pt to demo standing alexia to SUP for ~ 10 min with use of AD as needed. Patient Goals   Patient goals : To go home. Therapy Time   Individual Concurrent Group Co-treatment   Time In 1001         Time Out 1054         Minutes 53              Written and verbal edu provided to pt on safe ADL completion techniques and tips during bathing/showering, and toileting; EC/WS techniques of pacing, posturing, body mechanics, planning and organizing tasks, avoiding fatigue, and task simplification in regards to ADLs of eating, grooming, bathing/showering, dressing, and IADLs of cooking, meal cleanup, marketing and meal planning, laundry, bed making, and housework. Written and verbal edu provided to pt regarding fall prevention in the areas of community safety, transportation, proper footwear and clothing, reducing risk of falls, environmental modifications, importance of exercise, consequences of falling, plan if a fall occurs (\"rest and wait\" vs \"up and about\"). Upon writer exit, call light within reach, pt retired to chair. All lines intact and patient positioned comfortably. All patient needs addressed prior to ending therapy session. Chart reviewed prior to treatment and patient is agreeable for therapy. RN reports patient is medically stable for therapy treatment this date.     Enid Dockery OTR/JERSON Cardenas OT

## 2021-07-16 NOTE — PLAN OF CARE
Problem: Nutritional:  Goal: Ability to tolerate tube feedings without aspirating will improve  Description: Ability to tolerate tube feedings without aspirating will improve  Outcome: Ongoing  Goal: Consumption of food in small portions  Description: Consumption of food in small portions  7/15/2021 2351 by Veda Oh RN  Outcome: Ongoing  Note: Review diet daily and as needed with pt. Provide supplement nutrition as ordered by physician & educate pt. Review nutritional guidelines with pt.   7/15/2021 1609 by Torey Pitts  Outcome: Ongoing  Goal: Consumption of liquid of appropriate consistency  Description: Consumption of liquid of appropriate consistency  7/15/2021 2351 by Veda Oh RN  Outcome: Ongoing  7/15/2021 1609 by Torey Pitts  Outcome: Ongoing     Problem: Respiratory:  Goal: Ability to maintain a clear airway will improve  Description: Ability to maintain a clear airway will improve  7/15/2021 2351 by Veda Oh RN  Outcome: Ongoing  Note: Assess for adventitious breath sounds. Monitored SaO2 > 90%. Applied 02 per nasal cannula as needed. Elevated HOB to improve breathing as needed.      7/15/2021 1609 by Torey Pitts  Outcome: Ongoing  Goal: Will remain free from infection  Description: Will remain free from infection  7/15/2021 2351 by Veda Oh RN  Outcome: Ongoing  7/15/2021 1609 by Torey Pitts  Outcome: Ongoing  Goal: Absence of aspiration  Description: Absence of aspiration  7/15/2021 2351 by Veda Oh RN  Outcome: Ongoing  7/15/2021 1609 by Torey Pitts  Outcome: Ongoing     Problem: Safety:  Goal: Ability to chew and swallow food without choking will improve  Description: Ability to chew and swallow food without choking will improve  7/15/2021 2351 by Veda Oh RN  Outcome: Ongoing  7/15/2021 1609 by Torey Pitts  Outcome: Ongoing  Goal: Ability to demonstrate good, daily oral hygiene techniques will improve  Description: Ability to demonstrate good, daily oral hygiene techniques will improve  7/15/2021 2351 by Alfonzo Esquivel RN  Outcome: Ongoing  Note: Provide oral hygiene as needed   7/15/2021 1609 by Mari Negrete  Outcome: Ongoing  Goal: Maintenance of upright position during and after feeding  Description: Maintenance of upright position during and after feeding  7/15/2021 2351 by Alfonzo Esquivel RN  Outcome: Ongoing  7/15/2021 1609 by Mari Negrete  Outcome: Ongoing     Problem: Nutrition Deficit:  Goal: Ability to achieve adequate nutritional intake will improve  Description: Ability to achieve adequate nutritional intake will improve  7/15/2021 2351 by Alfonzo Esquivel RN  Outcome: Ongoing  Note: Review diet daily and as needed with pt. Provide supplement nutrition as ordered by physician & educate pt. Review nutritional guidelines with pt.   7/15/2021 1609 by Mari Negrete  Outcome: Ongoing     Problem: Falls - Risk of:  Goal: Will remain free from falls  Description: Will remain free from falls  7/15/2021 2351 by Alfonzo Esquivel RN  Outcome: Ongoing  Note: Pt fall risk, fall band present, falling star, safety alarm activated and in use as needed. Hourly rounding performed. Pt encouraged to use call light. See [facility] fall risk assessment. 7/15/2021 1609 by Mari Negrete  Outcome: Ongoing  Goal: Absence of physical injury  Description: Absence of physical injury  7/15/2021 2351 by Alfonzo Esquivel RN  Outcome: Ongoing  Note: Non-skid socks in place, up with assistance, bed in lowest position, bed exit & alarm as needed, provide toileting every 2 hours an d as needed. 7/15/2021 1609 by Mari Negrete  Outcome: Ongoing     Problem: Skin Integrity:  Goal: Will show no infection signs and symptoms  Description: Will show no infection signs and symptoms  7/15/2021 2351 by Alfonzo Esquivel RN  Outcome: Ongoing  Note: Monitor for signs & symptoms of infection. Utilize standard precautions & proper handwashing.   Communicate referral to infection control. 7/15/2021 1609 by Doris George  Outcome: Ongoing  Goal: Absence of new skin breakdown  Description: Absence of new skin breakdown  7/15/2021 2351 by Arta Severe, RN  Outcome: Ongoing  Note: Continuing to monitor for skin integrity risks. Patient independent with turning/repositioning. Turning/repositioning encouraged at least once every 2 hrs, and prn basis. Hygiene care being completed independently per patient; assistance provided when deemed necessary. 7/15/2021 1609 by Doris George  Outcome: Ongoing     Problem: IP BALANCE  Goal: LTG - Patient will maintain balance to allow for safe/functional mobility  7/15/2021 2351 by Arta Severe, RN  Outcome: Ongoing  Note: Pt able to utilize fine body movements, including, but not limited to dressing without assistance. 7/15/2021 1112 by Tamy Patel  Outcome: Ongoing     Problem: IP BALANCE  Goal: LTG - Patient will maintain balance to allow for safe/functional mobility  7/15/2021 2351 by Arta Severe, RN  Outcome: Ongoing  Note: Work with physical therapy. Assist with range of motion & toileting as needed.    7/15/2021 1146 by Mike Regalado PT  Outcome: Ongoing     Problem: Nutrition  Goal: Optimal nutrition therapy  Outcome: Ongoing

## 2021-07-19 LAB — SURGICAL PATHOLOGY REPORT: NORMAL

## 2021-07-28 ENCOUNTER — OFFICE VISIT (OUTPATIENT)
Dept: PRIMARY CARE CLINIC | Age: 76
End: 2021-07-28
Payer: MEDICARE

## 2021-07-28 ENCOUNTER — OFFICE VISIT (OUTPATIENT)
Dept: BARIATRICS/WEIGHT MGMT | Age: 76
End: 2021-07-28
Payer: MEDICARE

## 2021-07-28 VITALS
SYSTOLIC BLOOD PRESSURE: 118 MMHG | RESPIRATION RATE: 22 BRPM | BODY MASS INDEX: 27.2 KG/M2 | HEART RATE: 72 BPM | WEIGHT: 190 LBS | DIASTOLIC BLOOD PRESSURE: 84 MMHG | HEIGHT: 70 IN

## 2021-07-28 VITALS
OXYGEN SATURATION: 97 % | WEIGHT: 190.8 LBS | SYSTOLIC BLOOD PRESSURE: 103 MMHG | BODY MASS INDEX: 27.38 KG/M2 | TEMPERATURE: 97.8 F | HEART RATE: 70 BPM | DIASTOLIC BLOOD PRESSURE: 63 MMHG

## 2021-07-28 DIAGNOSIS — K21.9 GASTROESOPHAGEAL REFLUX DISEASE WITHOUT ESOPHAGITIS: Primary | ICD-10-CM

## 2021-07-28 DIAGNOSIS — I50.32 CHRONIC DIASTOLIC HEART FAILURE (HCC): ICD-10-CM

## 2021-07-28 DIAGNOSIS — E87.6 HYPOKALEMIA: ICD-10-CM

## 2021-07-28 DIAGNOSIS — I48.0 PAROXYSMAL ATRIAL FIBRILLATION (HCC): ICD-10-CM

## 2021-07-28 DIAGNOSIS — J18.9 PNEUMONIA DUE TO INFECTIOUS ORGANISM, UNSPECIFIED LATERALITY, UNSPECIFIED PART OF LUNG: Primary | ICD-10-CM

## 2021-07-28 DIAGNOSIS — R63.4 UNINTENTIONAL WEIGHT LOSS OF MORE THAN 10 POUNDS IN 90 DAYS: ICD-10-CM

## 2021-07-28 DIAGNOSIS — Z98.890 STATUS POST GASTROPLASTY: ICD-10-CM

## 2021-07-28 PROCEDURE — 99212 OFFICE O/P EST SF 10 MIN: CPT | Performed by: SURGERY

## 2021-07-28 PROCEDURE — 99214 OFFICE O/P EST MOD 30 MIN: CPT | Performed by: NURSE PRACTITIONER

## 2021-07-28 PROCEDURE — 1123F ACP DISCUSS/DSCN MKR DOCD: CPT | Performed by: SURGERY

## 2021-07-28 PROCEDURE — G8417 CALC BMI ABV UP PARAM F/U: HCPCS | Performed by: NURSE PRACTITIONER

## 2021-07-28 PROCEDURE — G8427 DOCREV CUR MEDS BY ELIG CLIN: HCPCS | Performed by: NURSE PRACTITIONER

## 2021-07-28 PROCEDURE — 1036F TOBACCO NON-USER: CPT | Performed by: NURSE PRACTITIONER

## 2021-07-28 PROCEDURE — 3017F COLORECTAL CA SCREEN DOC REV: CPT | Performed by: SURGERY

## 2021-07-28 PROCEDURE — 3017F COLORECTAL CA SCREEN DOC REV: CPT | Performed by: NURSE PRACTITIONER

## 2021-07-28 PROCEDURE — 1036F TOBACCO NON-USER: CPT | Performed by: SURGERY

## 2021-07-28 PROCEDURE — G8427 DOCREV CUR MEDS BY ELIG CLIN: HCPCS | Performed by: SURGERY

## 2021-07-28 PROCEDURE — 4040F PNEUMOC VAC/ADMIN/RCVD: CPT | Performed by: SURGERY

## 2021-07-28 PROCEDURE — 4040F PNEUMOC VAC/ADMIN/RCVD: CPT | Performed by: NURSE PRACTITIONER

## 2021-07-28 PROCEDURE — 1111F DSCHRG MED/CURRENT MED MERGE: CPT | Performed by: SURGERY

## 2021-07-28 PROCEDURE — 1111F DSCHRG MED/CURRENT MED MERGE: CPT | Performed by: NURSE PRACTITIONER

## 2021-07-28 PROCEDURE — G8417 CALC BMI ABV UP PARAM F/U: HCPCS | Performed by: SURGERY

## 2021-07-28 PROCEDURE — 1123F ACP DISCUSS/DSCN MKR DOCD: CPT | Performed by: NURSE PRACTITIONER

## 2021-07-28 RX ORDER — LACTOSE-REDUCED FOOD
LIQUID (ML) ORAL
Qty: 60 CAN | Refills: 2 | Status: SHIPPED | OUTPATIENT
Start: 2021-07-28

## 2021-07-28 RX ORDER — DOXYCYCLINE HYCLATE 100 MG
TABLET ORAL
COMMUNITY
Start: 2021-07-26 | End: 2021-10-21 | Stop reason: ALTCHOICE

## 2021-07-28 SDOH — ECONOMIC STABILITY: FOOD INSECURITY: WITHIN THE PAST 12 MONTHS, THE FOOD YOU BOUGHT JUST DIDN'T LAST AND YOU DIDN'T HAVE MONEY TO GET MORE.: NEVER TRUE

## 2021-07-28 SDOH — ECONOMIC STABILITY: FOOD INSECURITY: WITHIN THE PAST 12 MONTHS, YOU WORRIED THAT YOUR FOOD WOULD RUN OUT BEFORE YOU GOT MONEY TO BUY MORE.: NEVER TRUE

## 2021-07-28 ASSESSMENT — ENCOUNTER SYMPTOMS
FACIAL SWELLING: 0
ABDOMINAL PAIN: 1
TROUBLE SWALLOWING: 0
DIARRHEA: 0
SHORTNESS OF BREATH: 0
BLOOD IN STOOL: 0
VOMITING: 0
RHINORRHEA: 0
WHEEZING: 0
NAUSEA: 1

## 2021-07-28 ASSESSMENT — SOCIAL DETERMINANTS OF HEALTH (SDOH): HOW HARD IS IT FOR YOU TO PAY FOR THE VERY BASICS LIKE FOOD, HOUSING, MEDICAL CARE, AND HEATING?: NOT HARD AT ALL

## 2021-07-28 NOTE — PROGRESS NOTES
Post-Discharge Transitional Care Management Services or Hospital Follow Up      Omi Kaur   YOB: 1945    Date of Office Visit:  7/28/2021  Date of Hospital Admission: 7/14/21  Date of Hospital Discharge: 7/16/21  Readmission Risk Score(high >=14%.  Medium >=10%):Readmission Risk Score: 22      Care management risk score Rising risk (score 2-5) and Complex Care (Scores >=6): 7     Non face to face  following discharge, date last encounter closed (first attempt may have been earlier): *No documented post hospital discharge outreach found in the last 14 days *No documented post hospital discharge outreach found in the last 14 days    Call initiated 2 business days of discharge: *No response recorded in the last 14 days     Patient Active Problem List   Diagnosis    Chronic atrial fibrillation ([address]    Dyslipidemia    Gastroesophageal reflux disease without esophagitis    Osteoarthritis of lumbar spine    OA (osteoarthritis) of knee, bilateral    Foot drop, right    Hallux valgus, acquired, bilateral    Hearing impairment    Essential hypertension    Slow transit constipation    MARIAMA on CPAP    Simple chronic bronchitis (HCC)    Non-intractable vomiting    Hospital-acquired bacterial pneumonia    Fluid overload    Pneumonia due to organism    COPD (chronic obstructive pulmonary disease) ([address]    Chronic diastolic heart failure ([address]    History of bariatric surgery including banding leading to esophageal stricture and aspiration    BPH (benign prostatic hyperplasia)    Myalgia    Atrial fibrillation with slow ventricular response ([address]    Pacemaker pocket hematoma, initial encounter    Cellulitis of chest wall    Pneumonia-community-acquired    Acute hypoxemic respiratory failure ([address]    SIRS (systemic inflammatory response syndrome) ([address]    Pulmonary hypertension ([address]    Presence of permanent cardiac pacemaker    Aspiration pneumonia ([address]       Allergies Placard MISC  by Does not apply route             Incontinence Supply Disposable (INCONTINENCE BRIEF LARGE) MISC  To use as directed.  Use 3x a day             ipratropium-albuterol (DUONEB) 0.5-2.5 (3) MG/3ML SOLN nebulizer solution  Inhale 1 vial into the lungs every 4 hours as needed              lipase-protease-amylase (CREON) 51203-16164 units delayed release capsule  TAKE 1 CAPSULE BY MOUTH THREE TIMES DAILY WITH MEALS             loratadine (CLARITIN) 10 MG tablet  Take 1 tablet by mouth daily             Menthol-Methyl Salicylate (THERA-GESIC) 0.5-15 % CREA  Apply topically 2 times daily as needed             metoprolol succinate (TOPROL XL) 50 MG extended release tablet  Take 1 tablet by mouth nightly             Multiple Vitamin (MULTI-VITAMINS) TABS  Take 1 tablet by mouth daily             Nutritional Supplements (BOOST) LIQD  1 can twice a day for meals             pantoprazole (PROTONIX) 40 MG tablet  TAKE 1 TABLET BY MOUTH DAILY             potassium chloride (KLOR-CON M) 20 MEQ extended release tablet  Take 1 tablet by mouth 2 times daily             rOPINIRole (REQUIP) 0.25 MG tablet  TAKE 1 TABLET BY MOUTH TWICE DAILY             SM LUBRICANT EYE DROPS 0.4-0.3 % ophthalmic solution  PLACE 1 DROP INTO BOTH EYES FOUR TIMES DAILY AS NEEDED FOR DRY EYES             tamsulosin (FLOMAX) 0.4 MG capsule  Take 1 capsule by mouth daily             tiZANidine (ZANAFLEX) 4 MG tablet  TAKE 1 TABLET BY MOUTH DAILY AS NEEDED             vitamin B-12 (CYANOCOBALAMIN) 100 MCG tablet  TAKE 1 TABLET BY MOUTH DAILY AS NEEDED FOR FATIGUE                   Medications marked \"taking\" at this time  Outpatient Medications Marked as Taking for the 7/28/21 encounter (Office Visit) with Kym Cabot, APRN - CNP   Medication Sig Dispense Refill    doxycycline hyclate (VIBRA-TABS) 100 MG tablet       Fluticasone furoate-vilanterol (BREO ELLIPTA) 200-25 MCG/INH AEPB inhaler INHALE 1 PUFF INTO THE LUNGS DAILY **RINSE MOUTH AFTER EACH USE**      Nutritional Supplements (BOOST) LIQD 1 can twice a day for meals 60 Can 2    lipase-protease-amylase (CREON) 22263-04627 units delayed release capsule TAKE 1 CAPSULE BY MOUTH THREE TIMES DAILY WITH MEALS 84 capsule 3    metoprolol succinate (TOPROL XL) 50 MG extended release tablet Take 1 tablet by mouth nightly 30 tablet 3    Multiple Vitamin (MULTI-VITAMINS) TABS Take 1 tablet by mouth daily 28 tablet 5    gabapentin (NEURONTIN) 300 MG capsule TAKE 1 CAPSULE BY MOUTH THREE TIMES DAILY 84 capsule 2    potassium chloride (KLOR-CON M) 20 MEQ extended release tablet Take 1 tablet by mouth 2 times daily 56 tablet 5    rOPINIRole (REQUIP) 0.25 MG tablet TAKE 1 TABLET BY MOUTH TWICE DAILY 56 tablet 3    cetirizine (ZYRTEC) 10 MG tablet Take 1 tablet by mouth daily 30 tablet 5    docusate sodium (COLACE) 100 MG capsule Take 1 capsule by mouth 2 times daily as needed for Constipation 60 capsule 0    tiZANidine (ZANAFLEX) 4 MG tablet TAKE 1 TABLET BY MOUTH DAILY AS NEEDED 28 tablet 3    loratadine (CLARITIN) 10 MG tablet Take 1 tablet by mouth daily 90 tablet 1    bumetanide (BUMEX) 1 MG tablet Take 2 mg by mouth daily Take 2 mg of Bumex in morning, 1 mg of Bumex in afternoon, take 1 mg in evening if weight is >3 above baseline weight       albuterol sulfate HFA (VENTOLIN HFA) 108 (90 Base) MCG/ACT inhaler Inhale 2 puffs into the lungs every 6 hours as needed for Wheezing or Shortness of Breath      benzonatate (TESSALON) 100 MG capsule TAKE 1 CAPSULE BY MOUTH THREE TIMES DAILY AS NEEDED FOR COUGH 30 capsule 3    vitamin B-12 (CYANOCOBALAMIN) 100 MCG tablet TAKE 1 TABLET BY MOUTH DAILY AS NEEDED FOR FATIGUE 30 tablet 5    tamsulosin (FLOMAX) 0.4 MG capsule Take 1 capsule by mouth daily 30 capsule 5    pantoprazole (PROTONIX) 40 MG tablet TAKE 1 TABLET BY MOUTH DAILY 28 tablet 3    carboxymethylcellulose (REFRESH PLUS) 0.5 % SOLN ophthalmic solution Apply 1 drop to eye 4 times daily is a 77 y.o. male who was admitted for the management of   Aspiration pneumonia ([facility] , presented to ER with Fever and Emesis   Patient came to emergency room with intractable nausea and vomiting for last 1 day.  Wife also reported fever 101 °F today.  Patient was also having difficulty in breathing.  He has history of esophageal narrowing requiring multiple EGD for dilation.  Patient has history of gastric bypass in [address] [place] [address] has multiple comorbidities including A. fib, sleep apnea, hypertension, chronic bronchitis, chronic diastolic heart failure.  Evaluation emergency room showed fever 100.2 °F, tachycardia 108.  Lab evaluation showed normal left lites, kidney function, lactic acid 1.4, white count 8.8, hemoglobin 12.8.  Chest x-ray was negative.  CT chest showed bilateral lower lobe and right upper lobe groundglass infiltrates.  Covid test was negative. Patient was started on Rocephin. He had swallow study that was negative for aspiration.   Patient was evaluated by GI and had EGD on 7/16/21. No bezoars or strictures or food bolus was found. patient was discharged on Oral Augmentin. He was advised to follow up with bariatric surgeon as he has gastroplasty done in lower part of stomach rather  Than upper creating a small gastric pouch. Discuss re-do surgery with bariatric surgeon . Small frequent meals suggested. Interval history/Current status: Stable. Review of Systems   Constitutional: Negative for appetite change, fatigue, fever and unexpected weight change. HENT: Positive for congestion. Negative for ear discharge, facial swelling, hearing loss, rhinorrhea and trouble swallowing. Eyes: Negative for visual disturbance. Respiratory: Negative for shortness of breath and wheezing. Cardiovascular: Positive for leg swelling. Negative for chest pain and palpitations. Gastrointestinal: Positive for abdominal pain and nausea. Negative for blood in stool, diarrhea and vomiting.    Endocrine: Negative for polydipsia and polyuria. Genitourinary: Negative for dysuria, frequency and hematuria. Musculoskeletal: Negative for myalgias and neck pain. Skin: Negative for wound. Neurological: Negative for dizziness, seizures, syncope, numbness and headaches. Psychiatric/Behavioral: Negative for suicidal ideas. The patient is not nervous/anxious. Vitals:    07/28/21 1408   BP: 103/63   Site: Left Upper Arm   Position: Sitting   Cuff Size: Medium Adult   Pulse: 70   Temp: 97.8 °F (36.6 °C)   TempSrc: Temporal   SpO2: 97%   Weight: 190 lb 12.8 oz (86.5 kg)     Body mass index is 27.38 kg/m². Wt Readings from Last 3 Encounters:   07/28/21 190 lb 12.8 oz (86.5 kg)   07/28/21 190 lb (86.2 kg)   07/16/21 187 lb 2 oz (84.9 kg)     BP Readings from Last 3 Encounters:   07/28/21 103/63   07/28/21 118/84   07/16/21 116/73       Physical Exam  Vitals reviewed. Constitutional:       Appearance: Normal appearance. He is well-developed and well-groomed. He is not ill-appearing or toxic-appearing. HENT:      Head: Normocephalic and atraumatic. Right Ear: External ear normal.      Left Ear: External ear normal.      Nose: Nose normal.   Eyes:      General: Lids are normal. No scleral icterus. Conjunctiva/sclera: Conjunctivae normal.      Pupils: Pupils are equal, round, and reactive to light. Cardiovascular:      Rate and Rhythm: Normal rate and regular rhythm. Heart sounds: Normal heart sounds. Pulmonary:      Effort: Pulmonary effort is normal.      Breath sounds: No decreased air movement. Decreased breath sounds present. No wheezing or rhonchi. Abdominal:      Palpations: Abdomen is soft. There is no mass. Tenderness: There is no abdominal tenderness. There is no right CVA tenderness or left CVA tenderness. Musculoskeletal:         General: No tenderness. Right lower leg: Edema present. Left lower leg: Edema present. Skin:     General: Skin is warm and dry. Findings: Bruising present. Neurological:      Mental Status: He is alert and oriented to person, place, and time. Psychiatric:         Behavior: Behavior is cooperative. Assessment/Plan:  1. Pneumonia due to infectious organism, unspecified laterality, unspecified part of lung  Secondary to aspiration, referred to [facility]rics for possible revision of O[place]reich bypass  - [place] DISCHARGE MEDS RECONCILED W/ CURRENT OUTPATIENT MED LIST    2. Hypokalemia  Mild, repeat in 1 week  - Basic Metabolic Panel; Future    3. Unintentional weight loss of more than 10 pounds in 90 days  - Nutritional Supplements (BOOST) LIQD; 1 can twice a day for meals  Dispense: 60 Can; Refill: 2    4. Chronic diastolic heart failure (HCC)  - Nutritional Supplements (BOOST) LIQD; 1 can twice a day for meals  Dispense: 60 Can; Refill: 2    5. Paroxysmal atrial fibrillation ([facility]  -Has appointment with [facility]. Kuldeep Friedman at [facility] next month.   - Nutritional Supplements (BOOST) LIQD; 1 can twice a day for meals  Dispense: 60 Can; Refill: 2        Medical Decision Making: moderate complexity

## 2021-07-30 ENCOUNTER — TELEPHONE (OUTPATIENT)
Dept: PRIMARY CARE CLINIC | Age: 76
End: 2021-07-30

## 2021-07-30 NOTE — TELEPHONE ENCOUNTER
Jeri Hernandez called to inform provider that patient insurance will not cover prescription sent for nutritional supplement, office also called and informed patient.      Health Maintenance   Topic Date Due    COVID-19 Vaccine (1) Never done    Shingles Vaccine (1 of 2) Never done   [employer] Visit (AWV)  Never done    Flu vaccine (1) 09/01/2021    Potassium monitoring  07/16/2022    Creatinine monitoring  07/16/2022    Lipid screen  04/01/2026    DTaP/Tdap/Td vaccine (2 - Td or Tdap) 04/22/2029    Colon cancer screen colonoscopy  04/05/2031    Pneumococcal 65+ years Vaccine  Completed    AAA screen  Completed    Hepatitis C screen  Completed    Hepatitis A vaccine  Aged Out    Hepatitis B vaccine  Aged Out    Hib vaccine  Aged Out    Meningococcal (ACWY) vaccine  Aged Out             (applicable per patient's age: Cancer Screenings, Depression Screening, Fall Risk Screening, Immunizations)    LDL Cholesterol (mg/dL)   Date Value   04/01/2021 49     LDL Calculated (mg/dL)   Date Value   10/30/2013 67     AST (U/L)   Date Value   07/14/2021 23     ALT (U/L)   Date Value   07/14/2021 8     BUN (mg/dL)   Date Value   07/16/2021 11      (goal A1C is < 7)   (goal LDL is <100) need 30-50% reduction from baseline     BP Readings from Last 3 Encounters:   07/28/21 103/63   07/28/21 118/84   07/16/21 116/73    (goal /80)      All Future Testing planned in [facility]:  Lab Frequency Next Occurrence   Basic Metabolic Panel Once 98/50/2120       Next Visit Date:  Future Appointments   Date Time Provider Jeri Flynn   10/28/2021  1:00 PM JULIAN Mariscal - CNP [facility]            Patient Active Problem List:     Chronic atrial fibrillation (HCC)     Dyslipidemia     Gastroesophageal reflux disease without esophagitis     Osteoarthritis of lumbar spine     OA (osteoarthritis) of knee, bilateral     Foot drop, right     Hallux valgus, acquired, bilateral     Hearing impairment     Essential hypertension     Slow transit constipation     MARIAMA on CPAP     Simple chronic bronchitis (HCC)     Non-intractable vomiting     Hospital-acquired bacterial pneumonia     Fluid overload     Pneumonia due to organism     COPD (chronic obstructive pulmonary disease) (HCC)     Chronic diastolic heart failure (HCC)     History of bariatric surgery including banding leading to esophageal stricture and aspiration     BPH (benign prostatic hyperplasia)     Myalgia     Atrial fibrillation with slow ventricular response (HCC)     Pacemaker pocket hematoma, initial encounter     Cellulitis of chest wall     Pneumonia-community-acquired     Acute hypoxemic respiratory failure (HCC)     SIRS (systemic inflammatory response syndrome) ([address]     Pulmonary hypertension ([address]     Presence of permanent cardiac pacemaker     Aspiration pneumonia ([address]

## 2021-08-01 NOTE — PROGRESS NOTES
[facility]  [address] [place] [address] [place]  [phone] [facility] [id number]  Dept: [phone]    7/28/2021    CC: Follow up from EGD and Nausea    History:  77year old male who underwent vertical banded gastroplasty over 20 years ago. He presents for follow up after recent EGD and dilation. He is on PPI. He denies nausea today or abdominal pain. Tolerating a pureed diet today. He denies melena or hematochezia. [facility] would [facility].       Review of Systems:  General  Negative for: [] Weight Change   [x] Fatigue   [x] Fevers & Chills    [] Appetite change [] Other:    Positive for: [x] Weight Change   [] Fatigue   [] Fevers & Chills    [] Appetite change [] Other:   Cardiac  Negative for: [x] Chest Pain   [x] Difficulty Breathing   [] Leg Cramps [x] Edema of Lower Extremeties    [] Left   [] Right      Positive for: [] Chest Pain   [] Difficulty Breathing   [] Leg Cramps [] Edema of Lower Extremeties    [] Left   [] Right   Pulmonary  Negative for: [x] Shortness of Breath [] Wheeze [x] Cough  [] Calf Pain     Positive for: [] Shortness of Breath [] Wheeze [] Cough  [] Calf Pain   Gastro-Intestinal Negative for: [] Heartburn   [] Reflux   [] Dysphagia   [] Melena   [] BRBPR  [] Vomiting   [x] Abdominal Pain   [] Diarrhea     [] Constipation  [] Other:     Positive for: [x] Heartburn   [x] Reflux   [x] Dysphagia   [] Melena   [] BRBPR  [x] Vomiting   [] Abdominal Pain   [] Diarrhea     [] Constipation  [] Other:    Muskuloskeletal Negative for: [] Joint pain   [] Back pain   [] Knee Pain   [x] Muscle weakness [x] Hernia   [] Edema [] Other:     Positive for: [] Joint pain   [] Back pain   [] Knee Pain   [] Muscle weakness [] Hernia   [] Edema [] Other:    Neurologic Negative for: [x] Syncope   [x] Insomnia   [] Being treated for depression  [] Other:     Positive for: [] Syncope   [] Insomnia   [] Being treated for depression  [] Other:    Skin Negative for: [] Wound:   [] Open   [] Draining   [] Incisional     [x] Rash   [x] Hair Loss  [] Other:     Positive for: [] Wound:   [] Open   [] Draining    [] Incisional  [] Rash   [] Hair Loss  [] Other:            Physical Exam:  /84 (Site: Right Upper Arm, Position: Sitting, Cuff Size: Medium Adult)   Pulse 72   Resp 22   Ht 5' 10\" (1.778 m)   Wt 190 lb (86.2 kg)   BMI 27.26 kg/m²   Constitutional:  Vital signs are normal. The patient appears well-developed and well-nourished. HEENT:   Head: Normocephalic. Atraumatic  Eyes: pupils are equal and reactive. No scleral icterus is present. Neck: No mass and no thyromegaly present. Cardiovascular: Normal rate, regular rhythm, S1 normal and S2 normal.  Radial pulses present   Pulmonary/Chest: Effort normal and breath sounds normal. No retractions  Abdominal: Soft. Normal appearance. There is no organomegaly. No tenderness. There is no rigidity, no rebound, no guarding and no Diaz's sign. Musculoskeletal:        Right lower leg: Normal. No tenderness and no edema. Left lower leg: Normal. No tenderness and no edema. Neurological: The patient is alert and oriented. Moving all 4 extremities, sensation grossly intact bilateral  Skin: Skin is warm, dry and intact. Psychiatric: The patient has a normal mood and affect.  Speech is normal and behavior is normal. Judgment and thought content normal. Cognition and memory are normal.       Assessment:  GERD secondary to prior bariatric surgery  Status post dilation of VBG, multiple rounds of dilation  Gastritis  Tolerating soft diet at this time, now wants to consider surgery    Plan:  PPI daily  Referral to [facility], patient needs higher level of care

## 2021-08-09 ENCOUNTER — HOSPITAL ENCOUNTER (OUTPATIENT)
Age: 76
Setting detail: SPECIMEN
Discharge: HOME | End: 2021-08-09
Payer: MEDICARE

## 2021-08-09 ENCOUNTER — HOSPITAL ENCOUNTER (OUTPATIENT)
Age: 76
Discharge: HOME | End: 2021-08-11
Payer: MEDICARE

## 2021-08-09 ENCOUNTER — HOSPITAL ENCOUNTER (OUTPATIENT)
Dept: GENERAL RADIOLOGY | Age: 76
Discharge: HOME | End: 2021-08-11
Payer: MEDICARE

## 2021-08-09 ENCOUNTER — HOSPITAL ENCOUNTER (OUTPATIENT)
Age: 76
Discharge: HOME | End: 2021-08-09
Payer: MEDICARE

## 2021-08-09 ENCOUNTER — OFFICE VISIT (OUTPATIENT)
Dept: PRIMARY CARE CLINIC | Age: 76
End: 2021-08-09
Payer: MEDICARE

## 2021-08-09 ENCOUNTER — NURSE TRIAGE (OUTPATIENT)
Dept: OTHER | Facility: CLINIC | Age: 76
End: 2021-08-09

## 2021-08-09 VITALS
BODY MASS INDEX: 27.12 KG/M2 | OXYGEN SATURATION: 97 % | DIASTOLIC BLOOD PRESSURE: 63 MMHG | WEIGHT: 189 LBS | HEART RATE: 47 BPM | SYSTOLIC BLOOD PRESSURE: 103 MMHG | TEMPERATURE: 97.3 F

## 2021-08-09 DIAGNOSIS — R09.81 NASAL CONGESTION: ICD-10-CM

## 2021-08-09 DIAGNOSIS — R30.0 DYSURIA: Primary | ICD-10-CM

## 2021-08-09 DIAGNOSIS — R41.0 CONFUSION: ICD-10-CM

## 2021-08-09 DIAGNOSIS — Z76.0 MEDICATION REFILL: ICD-10-CM

## 2021-08-09 LAB
ABSOLUTE EOS #: 0.26 K/UL (ref 0–0.44)
ABSOLUTE IMMATURE GRANULOCYTE: 0.03 K/UL (ref 0–0.3)
ABSOLUTE LYMPH #: 1.16 K/UL (ref 1.1–3.7)
ABSOLUTE MONO #: 0.68 K/UL (ref 0.1–1.2)
ALBUMIN SERPL-MCNC: 4 G/DL (ref 3.5–5.2)
ALBUMIN/GLOBULIN RATIO: 1.3 (ref 1–2.5)
ALP BLD-CCNC: 96 U/L (ref 40–129)
ALT SERPL-CCNC: 11 U/L (ref 5–41)
ANION GAP SERPL CALCULATED.3IONS-SCNC: 15 MMOL/L (ref 9–17)
AST SERPL-CCNC: 22 U/L
BASOPHILS # BLD: 1 % (ref 0–2)
BASOPHILS ABSOLUTE: 0.05 K/UL (ref 0–0.2)
BILIRUB SERPL-MCNC: 0.93 MG/DL (ref 0.3–1.2)
BILIRUBIN, POC: NEGATIVE
BLOOD URINE, POC: NORMAL
BUN BLDV-MCNC: 17 MG/DL (ref 8–23)
BUN/CREAT BLD: ABNORMAL (ref 9–20)
CALCIUM SERPL-MCNC: 9.4 MG/DL (ref 8.6–10.4)
CHLORIDE BLD-SCNC: 97 MMOL/L (ref 98–107)
CLARITY, POC: CLEAR
CO2: 27 MMOL/L (ref 20–31)
COLOR, POC: YELLOW
CREAT SERPL-MCNC: 0.91 MG/DL (ref 0.7–1.2)
DIFFERENTIAL TYPE: ABNORMAL
EOSINOPHILS RELATIVE PERCENT: 4 % (ref 1–4)
GFR AFRICAN AMERICAN: >60 ML/MIN
GFR NON-AFRICAN AMERICAN: >60 ML/MIN
GFR SERPL CREATININE-BSD FRML MDRD: ABNORMAL ML/MIN/{1.73_M2}
GFR SERPL CREATININE-BSD FRML MDRD: ABNORMAL ML/MIN/{1.73_M2}
GLUCOSE BLD-MCNC: 96 MG/DL (ref 70–99)
GLUCOSE URINE, POC: NEGATIVE
HCT VFR BLD CALC: 38.3 % (ref 40.7–50.3)
HEMOGLOBIN: 12.3 G/DL (ref 13–17)
IMMATURE GRANULOCYTES: 0 %
KETONES, POC: NEGATIVE
LACTIC ACID, WHOLE BLOOD: 1.6 MMOL/L (ref 0.7–2.1)
LACTIC ACID: NORMAL MMOL/L
LEUKOCYTE EST, POC: NEGATIVE
LYMPHOCYTES # BLD: 16 % (ref 24–43)
MCH RBC QN AUTO: 32.7 PG (ref 25.2–33.5)
MCHC RBC AUTO-ENTMCNC: 32.1 G/DL (ref 28.4–34.8)
MCV RBC AUTO: 101.9 FL (ref 82.6–102.9)
MONOCYTES # BLD: 10 % (ref 3–12)
NITRITE, POC: NEGATIVE
NRBC AUTOMATED: 0 PER 100 WBC
PDW BLD-RTO: 13.5 % (ref 11.8–14.4)
PH, POC: 6
PLATELET # BLD: 193 K/UL (ref 138–453)
PLATELET ESTIMATE: ABNORMAL
PMV BLD AUTO: 8.9 FL (ref 8.1–13.5)
POTASSIUM SERPL-SCNC: 3.7 MMOL/L (ref 3.7–5.3)
PROTEIN, POC: NEGATIVE
RBC # BLD: 3.76 M/UL (ref 4.21–5.77)
RBC # BLD: ABNORMAL 10*6/UL
SEG NEUTROPHILS: 69 % (ref 36–65)
SEGMENTED NEUTROPHILS ABSOLUTE COUNT: 4.99 K/UL (ref 1.5–8.1)
SODIUM BLD-SCNC: 139 MMOL/L (ref 135–144)
SPECIFIC GRAVITY, POC: 1.01
TOTAL PROTEIN: 7 G/DL (ref 6.4–8.3)
UROBILINOGEN, POC: 0.2
WBC # BLD: 7.2 K/UL (ref 3.5–11.3)
WBC # BLD: ABNORMAL 10*3/UL

## 2021-08-09 PROCEDURE — G8427 DOCREV CUR MEDS BY ELIG CLIN: HCPCS | Performed by: PHYSICIAN ASSISTANT

## 2021-08-09 PROCEDURE — G8417 CALC BMI ABV UP PARAM F/U: HCPCS | Performed by: PHYSICIAN ASSISTANT

## 2021-08-09 PROCEDURE — 3017F COLORECTAL CA SCREEN DOC REV: CPT | Performed by: PHYSICIAN ASSISTANT

## 2021-08-09 PROCEDURE — 71046 X-RAY EXAM CHEST 2 VIEWS: CPT

## 2021-08-09 PROCEDURE — 99213 OFFICE O/P EST LOW 20 MIN: CPT | Performed by: PHYSICIAN ASSISTANT

## 2021-08-09 PROCEDURE — 1123F ACP DISCUSS/DSCN MKR DOCD: CPT | Performed by: PHYSICIAN ASSISTANT

## 2021-08-09 PROCEDURE — 36415 COLL VENOUS BLD VENIPUNCTURE: CPT

## 2021-08-09 PROCEDURE — 80053 COMPREHEN METABOLIC PANEL: CPT

## 2021-08-09 PROCEDURE — 85025 COMPLETE CBC W/AUTO DIFF WBC: CPT

## 2021-08-09 PROCEDURE — 83605 ASSAY OF LACTIC ACID: CPT

## 2021-08-09 PROCEDURE — 1036F TOBACCO NON-USER: CPT | Performed by: PHYSICIAN ASSISTANT

## 2021-08-09 PROCEDURE — 81003 URINALYSIS AUTO W/O SCOPE: CPT | Performed by: PHYSICIAN ASSISTANT

## 2021-08-09 PROCEDURE — 4040F PNEUMOC VAC/ADMIN/RCVD: CPT | Performed by: PHYSICIAN ASSISTANT

## 2021-08-09 PROCEDURE — 1111F DSCHRG MED/CURRENT MED MERGE: CPT | Performed by: PHYSICIAN ASSISTANT

## 2021-08-09 RX ORDER — FLUTICASONE PROPIONATE 50 MCG
2 SPRAY, SUSPENSION (ML) NASAL DAILY
Qty: 16 G | Refills: 3 | Status: SHIPPED | OUTPATIENT
Start: 2021-08-09 | End: 2021-12-29 | Stop reason: SDUPTHER

## 2021-08-09 ASSESSMENT — ENCOUNTER SYMPTOMS
SHORTNESS OF BREATH: 1
BLOOD IN STOOL: 0
VOMITING: 0
WHEEZING: 0
DIARRHEA: 0
BACK PAIN: 0
ABDOMINAL PAIN: 0
NAUSEA: 1
COUGH: 1

## 2021-08-09 NOTE — PROGRESS NOTES
Visit Information    Have you changed or started any medications since your last visit including any over-the-counter medicines, vitamins, or herbal medicines? no   Are you having any side effects from any of your medications? -  no  Have you stopped taking any of your medications? Is so, why? -  no    Have you seen any other physician or provider since your last visit? No  Have you had any other diagnostic tests since your last visit? No  Have you been seen in the emergency room and/or had an admission to a hospital since we last saw you? No  Have you had your routine dental cleaning in the past 6 months? no    Have you activated your [employer] account? If not, what are your barriers?  Yes     Patient Care Team:  JULIAN Soto CNP as PCP - General (Family Medicine)  JULIAN Soto CNP as PCP - [facility] [address] [facility] MD Lesia as Consulting Physician (Gastroenterology)  Batsheva Phipps MD as Consulting Physician (Cardiology)  Alexsander Oviedo MD as Consulting Physician (Pulmonology)  Snow Cook MD as Consulting Physician (Urology)    Medical History Review  Past Medical, Family, and Social History reviewed and does not contribute to the patient presenting condition    Health Maintenance   Topic Date Due    COVID-19 Vaccine (1) Never done    Shingles Vaccine (1 of 2) Never done   [employer] Visit (AWV)  Never done    Flu vaccine (1) 09/01/2021    Potassium monitoring  07/16/2022    Creatinine monitoring  07/16/2022    Lipid screen  04/01/2026    DTaP/Tdap/Td vaccine (2 - Td or Tdap) 04/22/2029    Colon cancer screen colonoscopy  04/05/2031    Pneumococcal 65+ years Vaccine  Completed    AAA screen  Completed    Hepatitis C screen  Completed    Hepatitis A vaccine  Aged Out    Hepatitis B vaccine  Aged Out    Hib vaccine  Aged Out    Meningococcal (ACWY) vaccine  Aged Out

## 2021-08-09 NOTE — PROGRESS NOTES
is not bringing up any sputum with this. He does not report that he is lightheaded, he does not report that he is dizzy, he denies any syncopal events. He reports that he does not feel generally ill at this time he is just concerned about his confusion. He does report that he is still more short of breath than his baseline which is also concerning to him as well. He reports no pain in his chest other than at his pacemaker insertion site which has been chronic since it was inserted in April. He denies any blood in his stool, he denies any episodes of vomiting, he denies any associated diarrhea. He is denying any new weakness in his arms or his legs, he is denying any other associated symptoms or complaints. Review of Systems   Constitutional: Negative for chills and fever. Respiratory: Positive for cough and shortness of breath. Negative for wheezing. Cardiovascular: Negative for chest pain and palpitations. Gastrointestinal: Positive for nausea. Negative for abdominal pain, blood in stool, diarrhea and vomiting. Genitourinary:        Dark urine   Musculoskeletal: Negative for back pain and neck pain. Skin: Negative for rash and wound. Neurological: Negative for dizziness, weakness, light-headedness and headaches. Psychiatric/Behavioral: Positive for confusion. Objective   Physical Exam  Vitals and nursing note reviewed. Constitutional:       Appearance: Normal appearance. HENT:      Head: Normocephalic and atraumatic. Mouth/Throat:      Mouth: Mucous membranes are moist.      Pharynx: Oropharynx is clear. No oropharyngeal exudate. Eyes:      Extraocular Movements: Extraocular movements intact. Conjunctiva/sclera: Conjunctivae normal.      Pupils: Pupils are equal, round, and reactive to light. Cardiovascular:      Rate and Rhythm: Normal rate. Rhythm irregular. Pulses: Normal pulses. Heart sounds: Normal heart sounds.    Pulmonary:      Effort: Pulmonary effort is normal.      Breath sounds: Rales (faint inspiratory rales throughout) present. No wheezing. Abdominal:      General: Abdomen is flat. Bowel sounds are normal.      Palpations: Abdomen is soft. Tenderness: There is no abdominal tenderness. Musculoskeletal:         General: Normal range of motion. Cervical back: Normal range of motion and neck supple. Lymphadenopathy:      Cervical: No cervical adenopathy. Skin:     General: Skin is warm and dry. Coloration: Skin is not jaundiced. Neurological:      General: No focal deficit present. Mental Status: He is alert. Mental status is at baseline. On this date 8/9/2021 I have spent 20 minutes reviewing previous notes, test results and face to face with the patient discussing the diagnosis and importance of compliance with the treatment plan as well as documenting on the day of the visit. An electronic signature was used to authenticate this note.     --Julianna Hobson PA-C

## 2021-08-10 DIAGNOSIS — R41.0 CONFUSION: ICD-10-CM

## 2021-08-11 LAB
SARS-COV-2: NORMAL
SARS-COV-2: NOT DETECTED
SOURCE: NORMAL

## 2021-08-19 DIAGNOSIS — M79.10 MYALGIA: ICD-10-CM

## 2021-08-20 RX ORDER — TIZANIDINE 4 MG/1
TABLET ORAL
Qty: 28 TABLET | Refills: 3 | Status: SHIPPED | OUTPATIENT
Start: 2021-08-20 | End: 2021-12-10

## 2021-08-27 ENCOUNTER — TELEPHONE (OUTPATIENT)
Dept: PRIMARY CARE CLINIC | Age: 76
End: 2021-08-27

## 2021-08-27 DIAGNOSIS — I48.0 PAROXYSMAL ATRIAL FIBRILLATION (HCC): ICD-10-CM

## 2021-08-27 DIAGNOSIS — Z76.0 MEDICATION REFILL: ICD-10-CM

## 2021-08-27 DIAGNOSIS — K21.9 GASTROESOPHAGEAL REFLUX DISEASE WITHOUT ESOPHAGITIS: ICD-10-CM

## 2021-08-27 RX ORDER — BUMETANIDE 1 MG/1
2 TABLET ORAL DAILY
Qty: 30 TABLET | Refills: 2 | Status: SHIPPED | OUTPATIENT
Start: 2021-08-27 | End: 2021-08-27 | Stop reason: SDUPTHER

## 2021-08-27 RX ORDER — BUMETANIDE 1 MG/1
TABLET ORAL
Qty: 120 TABLET | Refills: 2 | Status: SHIPPED | OUTPATIENT
Start: 2021-08-27 | End: 2021-11-15

## 2021-08-27 RX ORDER — PANTOPRAZOLE SODIUM 40 MG/1
40 TABLET, DELAYED RELEASE ORAL DAILY
Qty: 28 TABLET | Refills: 3 | Status: SHIPPED | OUTPATIENT
Start: 2021-08-27 | End: 2021-11-15

## 2021-08-27 NOTE — TELEPHONE ENCOUNTER
Geovanimar Tyshawn is requesting an increase in qty to 120 pills to  fill Bumetanide 1 mg prescription for one entire month, pls advise    Health Maintenance   Topic Date Due    COVID-19 Vaccine (1) Never done    Shingles Vaccine (1 of 2) Never done   [employer] Visit (AWV)  Never done    Flu vaccine (1) 09/01/2021    Potassium monitoring  08/09/2022    Creatinine monitoring  08/09/2022    Lipid screen  04/01/2026    DTaP/Tdap/Td vaccine (2 - Td or Tdap) 04/22/2029    Colon cancer screen colonoscopy  04/05/2031    Pneumococcal 65+ years Vaccine  Completed    AAA screen  Completed    Hepatitis C screen  Completed    Hepatitis A vaccine  Aged Out    Hepatitis B vaccine  Aged Out    Hib vaccine  Aged Out    Meningococcal (ACWY) vaccine  Aged Out             (applicable per patient's age: Cancer Screenings, Depression Screening, Fall Risk Screening, Immunizations)    LDL Cholesterol (mg/dL)   Date Value   04/01/2021 49     LDL Calculated (mg/dL)   Date Value   10/30/2013 67     AST (U/L)   Date Value   08/09/2021 22     ALT (U/L)   Date Value   08/09/2021 11     BUN (mg/dL)   Date Value   08/09/2021 17      (goal A1C is < 7)   (goal LDL is <100) need 30-50% reduction from baseline     BP Readings from Last 3 Encounters:   08/09/21 103/63   07/28/21 103/63   07/28/21 118/84    (goal /80)      All Future Testing planned in [facility]:  Lab Frequency Next Occurrence   Basic Metabolic Panel Once 93/41/2393       Next Visit Date:  Future Appointments   Date Time Provider Jeri Flynn   10/28/2021  1:00 PM JULIAN Hebert - CNP [facility]            Patient Active Problem List:     Chronic atrial fibrillation (HCC)     Dyslipidemia     Gastroesophageal reflux disease without esophagitis     Osteoarthritis of lumbar spine     OA (osteoarthritis) of knee, bilateral     Foot drop, right     Hallux valgus, acquired, bilateral     Hearing impairment     Essential hypertension     Slow transit constipation     MARIAMA on CPAP     Simple chronic bronchitis (HCC)     Non-intractable vomiting     Hospital-acquired bacterial pneumonia     Fluid overload     Pneumonia due to organism     COPD (chronic obstructive pulmonary disease) (HCC)     Chronic diastolic heart failure (HCC)     History of bariatric surgery including banding leading to esophageal stricture and aspiration     BPH (benign prostatic hyperplasia)     Myalgia     Atrial fibrillation with slow ventricular response (HCC)     Pacemaker pocket hematoma, initial encounter     Cellulitis of chest wall     Pneumonia-community-acquired     Acute hypoxemic respiratory failure (HCC)     SIRS (systemic inflammatory response syndrome) ([address]     Pulmonary hypertension ([address]     Presence of permanent cardiac pacemaker     Aspiration pneumonia ([address]

## 2021-09-18 DIAGNOSIS — G62.9 NEUROPATHY: ICD-10-CM

## 2021-09-18 DIAGNOSIS — Z76.0 MEDICATION REFILL: ICD-10-CM

## 2021-09-18 DIAGNOSIS — K86.89 PANCREATIC INSUFFICIENCY: ICD-10-CM

## 2021-09-19 NOTE — TELEPHONE ENCOUNTER
Health Maintenance   Topic Date Due    COVID-19 Vaccine (1) Never done    Shingles Vaccine (1 of 2) Never done   [employer] Visit (AWV)  Never done    Flu vaccine (1) 09/01/2021    Potassium monitoring  08/09/2022    Creatinine monitoring  08/09/2022    Lipid screen  04/01/2026    DTaP/Tdap/Td vaccine (2 - Td or Tdap) 04/22/2029    Colon cancer screen colonoscopy  04/05/2031    Pneumococcal 65+ years Vaccine  Completed    AAA screen  Completed    Hepatitis C screen  Completed    Hepatitis A vaccine  Aged Out    Hepatitis B vaccine  Aged Out    Hib vaccine  Aged Out    Meningococcal (ACWY) vaccine  Aged Out             (applicable per patient's age: Cancer Screenings, Depression Screening, Fall Risk Screening, Immunizations)    LDL Cholesterol (mg/dL)   Date Value   04/01/2021 49     LDL Calculated (mg/dL)   Date Value   10/30/2013 67     AST (U/L)   Date Value   08/09/2021 22     ALT (U/L)   Date Value   08/09/2021 11     BUN (mg/dL)   Date Value   08/09/2021 17      (goal A1C is < 7)   (goal LDL is <100) need 30-50% reduction from baseline     BP Readings from Last 3 Encounters:   08/09/21 103/63   07/28/21 103/63   07/28/21 118/84    (goal /80)      All Future Testing planned in [facility]:  Lab Frequency Next Occurrence   Basic Metabolic Panel Once 64/94/0474       Next Visit Date:  Future Appointments   Date Time Provider Jeri Flynn   10/28/2021  1:00 PM JULIAN Lima CNP [facility]            Patient Active Problem List:     Chronic atrial fibrillation (HCC)     Dyslipidemia     Gastroesophageal reflux disease without esophagitis     Osteoarthritis of lumbar spine     OA (osteoarthritis) of knee, bilateral     Foot drop, right     Hallux valgus, acquired, bilateral     Hearing impairment     Essential hypertension     Slow transit constipation     MARIAMA on CPAP     Simple chronic bronchitis (HCC)     Non-intractable vomiting     Hospital-acquired bacterial pneumonia     Fluid overload     Pneumonia due to organism     COPD (chronic obstructive pulmonary disease) (HCC)     Chronic diastolic heart failure (HCC)     History of bariatric surgery including banding leading to esophageal stricture and aspiration     BPH (benign prostatic hyperplasia)     Myalgia     Atrial fibrillation with slow ventricular response (HCC)     Pacemaker pocket hematoma, initial encounter     Cellulitis of chest wall     Pneumonia-community-acquired     Acute hypoxemic respiratory failure (HCC)     SIRS (systemic inflammatory response syndrome) ([address]     Pulmonary hypertension ([address]     Presence of permanent cardiac pacemaker     Aspiration pneumonia ([address]

## 2021-09-20 RX ORDER — GABAPENTIN 300 MG/1
CAPSULE ORAL
Qty: 84 CAPSULE | Refills: 2 | Status: SHIPPED | OUTPATIENT
Start: 2021-09-20 | End: 2021-11-10 | Stop reason: SDUPTHER

## 2021-09-20 RX ORDER — METOPROLOL SUCCINATE 50 MG/1
TABLET, EXTENDED RELEASE ORAL
Qty: 28 TABLET | Refills: 3 | Status: ON HOLD | OUTPATIENT
Start: 2021-09-20 | End: 2022-01-10

## 2021-09-20 RX ORDER — PANCRELIPASE 24000; 76000; 120000 [USP'U]/1; [USP'U]/1; [USP'U]/1
CAPSULE, DELAYED RELEASE PELLETS ORAL
Qty: 84 CAPSULE | Refills: 3 | Status: ON HOLD | OUTPATIENT
Start: 2021-09-20 | End: 2022-01-10

## 2021-10-06 ENCOUNTER — TELEPHONE (OUTPATIENT)
Dept: PRIMARY CARE CLINIC | Age: 76
End: 2021-10-06

## 2021-10-06 DIAGNOSIS — N39.0 RECURRENT UTI: ICD-10-CM

## 2021-10-06 RX ORDER — CEPHALEXIN 500 MG/1
500 CAPSULE ORAL 2 TIMES DAILY
Qty: 14 CAPSULE | Refills: 0 | Status: SHIPPED | OUTPATIENT
Start: 2021-10-06 | End: 2021-10-13

## 2021-10-06 NOTE — TELEPHONE ENCOUNTER
Keflex sent to pharmacy, if he is still having symptoms over the next 3 to 4 days he needs to come in for an evaluation and urine sample.

## 2021-10-06 NOTE — TELEPHONE ENCOUNTER
Pt called stating that he has an uti and wanted to know if you can prescribe him something for it. Offered for pt to come to walk in clinic to be evaluated states e having the same symptoms as before and was given med for it.

## 2021-10-11 ENCOUNTER — TELEPHONE (OUTPATIENT)
Dept: BARIATRICS/WEIGHT MGMT | Age: 76
End: 2021-10-11

## 2021-10-11 NOTE — TELEPHONE ENCOUNTER
Patient called about his referral to [address] [place] [address].  What doctor do you want to refer him to?

## 2021-10-13 ENCOUNTER — TELEPHONE (OUTPATIENT)
Dept: FAMILY MEDICINE CLINIC | Age: 76
End: 2021-10-13

## 2021-10-19 DIAGNOSIS — R39.11 BENIGN PROSTATIC HYPERPLASIA WITH URINARY HESITANCY: ICD-10-CM

## 2021-10-19 DIAGNOSIS — N40.1 BENIGN PROSTATIC HYPERPLASIA WITH URINARY HESITANCY: ICD-10-CM

## 2021-10-19 DIAGNOSIS — Z76.0 MEDICATION REFILL: ICD-10-CM

## 2021-10-19 RX ORDER — TAMSULOSIN HYDROCHLORIDE 0.4 MG/1
0.4 CAPSULE ORAL DAILY
Qty: 30 CAPSULE | Refills: 5 | Status: SHIPPED | OUTPATIENT
Start: 2021-10-19

## 2021-10-19 NOTE — TELEPHONE ENCOUNTER
Health Maintenance   Topic Date Due    COVID-19 Vaccine (1) Never done    Shingles Vaccine (1 of 2) Never done   Pradip Desi Annual Wellness Visit (AWV)  10/31/2020    Flu vaccine (1) 09/01/2021    Potassium monitoring  08/09/2022    Creatinine monitoring  08/09/2022    Lipid screen  04/01/2026    DTaP/Tdap/Td vaccine (2 - Td or Tdap) 04/22/2029    Colon cancer screen colonoscopy  04/05/2031    Pneumococcal 65+ years Vaccine  Completed    AAA screen  Completed    Hepatitis C screen  Completed    Hepatitis A vaccine  Aged Out    Hepatitis B vaccine  Aged Out    Hib vaccine  Aged Out    Meningococcal (ACWY) vaccine  Aged Out             (applicable per patient's age: Cancer Screenings, Depression Screening, Fall Risk Screening, Immunizations)    LDL Cholesterol (mg/dL)   Date Value   04/01/2021 49     LDL Calculated (mg/dL)   Date Value   10/30/2013 67     AST (U/L)   Date Value   08/09/2021 22     ALT (U/L)   Date Value   08/09/2021 11     BUN (mg/dL)   Date Value   08/09/2021 17      (goal A1C is < 7)   (goal LDL is <100) need 30-50% reduction from baseline     BP Readings from Last 3 Encounters:   08/09/21 103/63   07/28/21 103/63   07/28/21 118/84    (goal /80)      All Future Testing planned in [facility]:  Lab Frequency Next Occurrence   Basic Metabolic Panel Once 81/23/9896       Next Visit Date:  Future Appointments   Date Time Provider Jeri Flynn   10/21/2021  2:00 PM JULIAN Bhatia CNP [facility]   11/10/2021  2:30 PM JULIAN Bhatia - CNP [facility]            Patient Active Problem List:     Chronic atrial fibrillation (HCC)     Dyslipidemia     Gastroesophageal reflux disease without esophagitis     Osteoarthritis of lumbar spine     OA (osteoarthritis) of knee, bilateral     Foot drop, right     Hallux valgus, acquired, bilateral     Hearing impairment     Essential hypertension     Slow transit constipation     MARIAMA on CPAP     Simple chronic bronchitis

## 2021-10-21 ENCOUNTER — HOSPITAL ENCOUNTER (OUTPATIENT)
Age: 76
Setting detail: SPECIMEN
Discharge: HOME | End: 2021-10-21
Payer: MEDICARE

## 2021-10-21 ENCOUNTER — OFFICE VISIT (OUTPATIENT)
Dept: PRIMARY CARE CLINIC | Age: 76
End: 2021-10-21
Payer: MEDICARE

## 2021-10-21 VITALS
HEART RATE: 70 BPM | HEIGHT: 70 IN | OXYGEN SATURATION: 100 % | WEIGHT: 190 LBS | DIASTOLIC BLOOD PRESSURE: 65 MMHG | TEMPERATURE: 97.2 F | BODY MASS INDEX: 27.2 KG/M2 | SYSTOLIC BLOOD PRESSURE: 102 MMHG

## 2021-10-21 DIAGNOSIS — R35.0 URINARY FREQUENCY: Primary | ICD-10-CM

## 2021-10-21 DIAGNOSIS — Z71.89 ACP (ADVANCE CARE PLANNING): ICD-10-CM

## 2021-10-21 DIAGNOSIS — Z00.00 ROUTINE GENERAL MEDICAL EXAMINATION AT A HEALTH CARE FACILITY: ICD-10-CM

## 2021-10-21 DIAGNOSIS — N39.0 RECURRENT UTI: ICD-10-CM

## 2021-10-21 LAB
BILIRUBIN, POC: NEGATIVE
BLOOD URINE, POC: NORMAL
CLARITY, POC: CLEAR
COLOR, POC: YELLOW
GLUCOSE URINE, POC: NEGATIVE
KETONES, POC: NEGATIVE
LEUKOCYTE EST, POC: NEGATIVE
NITRITE, POC: NEGATIVE
PH, POC: 7
PROTEIN, POC: NEGATIVE
SPECIFIC GRAVITY, POC: 1.01
UROBILINOGEN, POC: 0.2

## 2021-10-21 PROCEDURE — G8417 CALC BMI ABV UP PARAM F/U: HCPCS | Performed by: NURSE PRACTITIONER

## 2021-10-21 PROCEDURE — G8484 FLU IMMUNIZE NO ADMIN: HCPCS | Performed by: NURSE PRACTITIONER

## 2021-10-21 PROCEDURE — 3017F COLORECTAL CA SCREEN DOC REV: CPT | Performed by: NURSE PRACTITIONER

## 2021-10-21 PROCEDURE — 81003 URINALYSIS AUTO W/O SCOPE: CPT | Performed by: NURSE PRACTITIONER

## 2021-10-21 PROCEDURE — 4040F PNEUMOC VAC/ADMIN/RCVD: CPT | Performed by: NURSE PRACTITIONER

## 2021-10-21 PROCEDURE — 1123F ACP DISCUSS/DSCN MKR DOCD: CPT | Performed by: NURSE PRACTITIONER

## 2021-10-21 PROCEDURE — G0439 PPPS, SUBSEQ VISIT: HCPCS | Performed by: NURSE PRACTITIONER

## 2021-10-21 PROCEDURE — G8427 DOCREV CUR MEDS BY ELIG CLIN: HCPCS | Performed by: NURSE PRACTITIONER

## 2021-10-21 PROCEDURE — 99213 OFFICE O/P EST LOW 20 MIN: CPT | Performed by: NURSE PRACTITIONER

## 2021-10-21 PROCEDURE — 1036F TOBACCO NON-USER: CPT | Performed by: NURSE PRACTITIONER

## 2021-10-21 RX ORDER — SULFAMETHOXAZOLE AND TRIMETHOPRIM 800; 160 MG/1; MG/1
1 TABLET ORAL 2 TIMES DAILY
Qty: 20 TABLET | Refills: 0 | Status: SHIPPED | OUTPATIENT
Start: 2021-10-21 | End: 2021-10-31

## 2021-10-21 ASSESSMENT — PATIENT HEALTH QUESTIONNAIRE - PHQ9
2. FEELING DOWN, DEPRESSED OR HOPELESS: 0
SUM OF ALL RESPONSES TO PHQ9 QUESTIONS 1 & 2: 0
SUM OF ALL RESPONSES TO PHQ QUESTIONS 1-9: 0
1. LITTLE INTEREST OR PLEASURE IN DOING THINGS: 0

## 2021-10-21 ASSESSMENT — ENCOUNTER SYMPTOMS
DIARRHEA: 0
NAUSEA: 0
BLOOD IN STOOL: 0

## 2021-10-21 ASSESSMENT — LIFESTYLE VARIABLES: HOW OFTEN DO YOU HAVE A DRINK CONTAINING ALCOHOL: 0

## 2021-10-21 NOTE — PROGRESS NOTES
[facility] [address] PRIMARY CARE  [facility] [address] [facility] [address]  Dept: [phone]  Dept Fax: [phone]    Patient Care Team:  JULIAN Mcclendon CNP as PCP - General (Family Medicine)  JULIAN Mcclendon CNP as PCP - [facility]  [facility] [address] [place] as Consulting Physician (Gastroenterology)  Chayito Cary MD as Consulting Physician (Cardiology)  Jacy Salazar MD as Consulting Physician (Pulmonology)  Soumya Clark MD as Consulting Physician (Urology)    10/21/2021     Carolina Meek (:  1945)is a 77 y.o. male, here for evaluation of the following medical concerns:   Chief Complaint   Patient presents with    Medicare AWV    Urinary Frequency       Concern for UTI, hx of frequent UTI's. Having     . Review of Systems   Constitutional: Negative for chills and fever. Gastrointestinal: Negative for blood in stool, diarrhea and nausea. Genitourinary: Positive for frequency. Negative for dysuria, flank pain and hematuria. Prior to Visit Medications    Medication Sig Taking?  Authorizing Provider   sulfamethoxazole-trimethoprim (BACTRIM DS;SEPTRA DS) 800-160 MG per tablet Take 1 tablet by mouth 2 times daily for 10 days Yes JULIAN Mcclendon CNP   tamsulosin (FLOMAX) 0.4 MG capsule Take 1 capsule by mouth daily Yes JULIAN Mcclendon CNP   gabapentin (NEURONTIN) 300 MG capsule TAKE 1 CAPSULE BY MOUTH THREE TIMES DAILY Yes JULIAN Mcclendon CNP   CREON 39308-41614 units delayed release capsule TAKE 1 CAPSULE BY MOUTH THREE TIMES DAILY WITH MEALS Yes JULIAN Mcclendon CNP   metoprolol succinate (TOPROL XL) 50 MG extended release tablet TAKE 1 TABLET BY MOUTH AT BEDTIME Yes JULIAN Mcclendon CNP   pantoprazole (PROTONIX) 40 MG tablet Take 1 tablet by mouth daily Yes JULIAN Mcclendon CNP   rivaroxaban (XARELTO) 20 MG TABS Lab Results   Component Value Date     08/09/2021    K 3.7 08/09/2021    CL 97 08/09/2021    CO2 27 08/09/2021    BUN 17 08/09/2021    CREATININE 0.91 08/09/2021    GLUCOSE 96 08/09/2021    GLUCOSE 99 09/13/2011       HEMOGLOBIN A1C: No results found for: LABA1C    FASTING LIPID PANEL:  Lab Results   Component Value Date    CHOL 157 07/06/2018    HDL 72 04/01/2021    TRIG 101 07/06/2018       Physical Exam  Constitutional:       Appearance: Normal appearance. He is not ill-appearing. Pulmonary:      Effort: Pulmonary effort is normal.   Abdominal:      Tenderness: There is no right CVA tenderness or left CVA tenderness. Skin:     General: Skin is warm. Neurological:      Mental Status: He is alert. Mental status is at baseline. ASSESSMENT     Diagnosis Orders   1. Urinary frequency  POCT Urinalysis No Micro (Auto)    Culture, Urine    sulfamethoxazole-trimethoprim (BACTRIM DS;SEPTRA DS) 800-160 MG per tablet   2. ACP (advance care planning)     3. Routine general medical examination at a health care facility     4. Recurrent UTI  Culture, Urine    sulfamethoxazole-trimethoprim (BACTRIM DS;SEPTRA DS) 800-160 MG per tablet          PLAN:  Orders Placed This Encounter   Medications    sulfamethoxazole-trimethoprim (BACTRIM DS;SEPTRA DS) 800-160 MG per tablet     Sig: Take 1 tablet by mouth 2 times daily for 10 days     Dispense:  20 tablet     Refill:  0         1. Will send for urine culture due to hx of frequent UTI's    FOLLOW UP AND INSTRUCTIONS:  Return for Medicare Annual Wellness Visit in 1 year. · Alan Palacios received counseling on the following healthy behaviors:medication adherence    · Discussed use, benefit, and side effects of prescribed medications. Barriers to  medication compliance addressed. All patient questions answered. Pt  verbalized understanding of all instructions given.     · Patient given educational materials - see patient instructions      · Patient advised to contact scheduling offices for any referrals or imaging orders  placed today if they have not been contacted in 48 hours. Return for Medicare Annual Wellness Visit in 1 year. An electronic signature was used to authenticate this note. --JULIAN Lindsay CNP on 10/21/2021 at 4:50 PM  Visit Information    Have you changed or started any medications since your last visit including any over-the-counter medicines, vitamins, or herbal medicines? no   Are you having any side effects from any of your medications? -  no  Have you stopped taking any of your medications? Is so, why? -  no    Have you seen any other physician or provider since your last visit? No  Have you had any other diagnostic tests since your last visit? No  Have you been seen in the emergency room and/or had an admission to a hospital since we last saw you? No  Have you had your routine dental cleaning in the past 6 months? no    Have you activated your [employer] account? If not, what are your barriers?  Yes     Patient Care Team:  JULIAN Lindsay CNP as PCP - General (Family Medicine)  JULIAN Lindsay CNP as PCP - [address]  Selvin Sanders MD as Consulting Physician (Gastroenterology)  Radha Birch MD as Consulting Physician (Cardiology)  Arabella Ching MD as Consulting Physician (Pulmonology)  Christelle Agarwal MD as Consulting Physician (Urology)    Medical History Review  Past Medical, Family, and Social History reviewed and does not contribute to the patient presenting condition    Health Maintenance   Topic Date Due    COVID-19 Vaccine (1) Never done    Shingles Vaccine (1 of 2) Never done    Annual Wellness Visit (AWV)  10/31/2020    Flu vaccine (1) 06/30/2022 (Originally 9/1/2021)    Potassium monitoring  08/09/2022    Creatinine monitoring  08/09/2022    Lipid screen  04/01/2026    DTaP/Tdap/Td vaccine (2 - Td or Tdap) 04/22/2029    Colon cancer screen colonoscopy  04/05/2031    Pneumococcal 65+ years Vaccine  Completed    AAA screen  Completed    Hepatitis C screen  Completed    Hepatitis A vaccine  Aged Out    Hepatitis B vaccine  Aged Out    Hib vaccine  Aged Out    Meningococcal (ACWY) vaccine  Aged Out

## 2021-10-21 NOTE — PROGRESS NOTES
Medicare Annual Wellness Visit  Name: [facility] Date: 10/21/2021   MRN: [id number] Sex: Male   Age: 77 y.o. Ethnicity: Non- / Non    : 1945 Race: White (non-)      Pete Franklin is here for Medicare AWV and Urinary Frequency    Screenings for behavioral, psychosocial and functional/safety risks, and cognitive dysfunction are all negative except as indicated below. These results, as well as other patient data from the [address] form, are documented in Flowsheets linked to this Encounter. Allergies   Allergen Reactions    Darvocet [Propoxyphene N-Acetaminophen] Hives    Other Hives    Oxycodone-Acetaminophen     Propoxyphene      Other reaction(s): Unknown       Prior to Visit Medications    Medication Sig Taking?  Authorizing Provider   tamsulosin (FLOMAX) 0.4 MG capsule Take 1 capsule by mouth daily Yes JULIAN Baron CNP   gabapentin (NEURONTIN) 300 MG capsule TAKE 1 CAPSULE BY MOUTH THREE TIMES DAILY Yes JULIAN Baron CNP   CREON 87058-77158 units delayed release capsule TAKE 1 CAPSULE BY MOUTH THREE TIMES DAILY WITH MEALS Yes JULIAN Baron CNP   metoprolol succinate (TOPROL XL) 50 MG extended release tablet TAKE 1 TABLET BY MOUTH AT BEDTIME Yes JULIAN Baron CNP   pantoprazole (PROTONIX) 40 MG tablet Take 1 tablet by mouth daily Yes JULIAN Baron CNP   rivaroxaban (XARELTO) 20 MG TABS tablet TAKE 1 TABLET BY MOUTH DAILY Yes JULIAN Baron CNP   bumetanide (BUMEX) 1 MG tablet Take 2 mg of Bumex in AM, 1 mg of Bumex in afternoon, take 1 mg in PM if weight is >3 above baseline weight Yes JULIAN Baron CNP   tiZANidine (ZANAFLEX) 4 MG tablet TAKE 1 TABLET BY MOUTH DAILY AS NEEDED Yes JULIAN Baron CNP   fluticasone (FLONASE) 50 MCG/ACT nasal spray 2 sprays by Nasal route daily Yes Annita Rowe PA-C   doxycycline hyclate (VIBRA-TABS) 100 MG tablet  Yes Historical Provider, MD   Fluticasone furoate-vilanterol (BREO ELLIPTA) 200-25 MCG/INH AEPB inhaler INHALE 1 PUFF INTO THE LUNGS DAILY **RINSE MOUTH AFTER EACH USE** Yes Historical Provider, MD   Nutritional Supplements (BOOST) LIQD 1 can twice a day for meals Yes JULIAN Mcclendon CNP   Multiple Vitamin (MULTI-VITAMINS) TABS Take 1 tablet by mouth daily Yes JULIAN Mcclendon CNP   potassium chloride (KLOR-CON M) 20 MEQ extended release tablet Take 1 tablet by mouth 2 times daily Yes JULIAN Mcclendon CNP   rOPINIRole (REQUIP) 0.25 MG tablet TAKE 1 TABLET BY MOUTH TWICE DAILY Yes JULIAN Mcclendon CNP   cetirizine (ZYRTEC) 10 MG tablet Take 1 tablet by mouth daily Yes JULIAN Mcclendon CNP   docusate sodium (COLACE) 100 MG capsule Take 1 capsule by mouth 2 times daily as needed for Constipation Yes JULIAN Mcclendon CNP   loratadine (CLARITIN) 10 MG tablet Take 1 tablet by mouth daily Yes JULIAN Mcclendon CNP   albuterol sulfate HFA (VENTOLIN HFA) 108 (90 Base) MCG/ACT inhaler Inhale 2 puffs into the lungs every 6 hours as needed for Wheezing or Shortness of Breath Yes Historical Provider, MD   benzonatate (TESSALON) 100 MG capsule TAKE 1 CAPSULE BY MOUTH THREE TIMES DAILY AS NEEDED FOR COUGH Yes Norberto Liu PA-C   vitamin B-12 (CYANOCOBALAMIN) 100 MCG tablet TAKE 1 TABLET BY MOUTH DAILY AS NEEDED FOR FATIGUE Yes Norberto Liu PA-C   carboxymethylcellulose (REFRESH PLUS) 0.5 % SOLN ophthalmic solution Apply 1 drop to eye 4 times daily Yes Historical Provider, MD   Menthol-Methyl Salicylate (THERA-GESIC) 0.5-15 % CREA Apply topically 2 times daily as needed Yes Historical Provider, MD   ascorbic acid (VITAMIN C) 500 MG tablet Take 1 tablet by mouth daily Yes Norberto Liu PA-C   ferrous sulfate (FE TABS 325) 325 (65 Fe) MG EC tablet Take 1 tablet by mouth 2 times daily (with meals) Yes Mina Henderson PA-C   SM LUBRICANT EYE DROPS 0.4-0.3 % ophthalmic solution PLACE 1 DROP INTO BOTH EYES FOUR TIMES DAILY AS NEEDED FOR DRY EYES Yes Sara Ospina PA-C   Handicap Placard MISC by Does not apply route Yes Sara Ospina PA-C   Incontinence Supply Disposable (INCONTINENCE BRIEF LARGE) MISC To use as directed. Use 3x a day Yes Sara Ospina PA-C   Foot Care Products (TRI-BALANCE ORTHOTICS MENS) MISC Provide insurance covered orthotics shoes, wear daily Yes Sara Ospina PA-C   ipratropium-albuterol (DUONEB) 0.5-2.5 (3) MG/3ML SOLN nebulizer solution Inhale 1 vial into the lungs every 4 hours as needed  Yes Historical Provider, MD   Armodafinil 200 MG TABS Take 1 tablet by mouth 2 times daily.   Yes Historical Provider, MD       Past Medical History:   Diagnosis Date    Acute superficial gastritis without hemorrhage 05/26/2018    Anastomotic stricture of stomach     Asthma     Atrial fibrillation ([facility] [address]     On Xarelto 6/27/2020    Calculus of gallbladder without cholecystitis without obstruction 05/02/2017    Chronic diastolic heart failure ([facility] [address] 11/17/2017    Chronic rhinosinusitis 04/13/2015    Class 2 severe obesity due to excess calories with serious comorbidity and body mass index (BMI) of 36.0 to 36.9 in adult [facility]) 11/17/2017    COPD (chronic obstructive pulmonary disease) ([facility]     E. coli septicemia ([facility])     E. coli UTI     Foot drop, right 07/10/2012    Fracture of femur ([facility] 10/23/2016    Gait disorder 07/20/2016    Gastric out let obstruction     GERD (gastroesophageal reflux disease)     Hallux valgus, acquired, bilateral 06/24/2015    Hyperlipidemia     Hypertension     Impaired hearing 04/13/2015    Internal hemorrhoids 01/03/2017    Lymphedema of both lower extremities 06/24/2015    Nausea & vomiting 11/16/2018    OA (osteoarthritis) of knee, bilateral 12/11/2006    Obesity     MARIAMA on CPAP 05/02/2017    Osteoarthritis     Osteoarthritis of lumbar spine 12/13/2007     replace inactive diagnosis    S/P revision of total knee ENDOSCOPY N/A 11/19/2018    EGD DILATION BALLOON performed by Yesi Santiago MD at [facility] 1 N/A 10/15/2020    EGD SUBMUCOSAL/BOTOX INJECTION tattoo  performed by Charleen Armendariz MD at [facility] 1 N/A 7/16/2021    EGD BIOPSY performed by River Schwartz MD at [place] [address] History   Problem Relation Age of Onset    Cancer Mother     Heart Attack Father        CareTeam (Including outside providers/suppliers regularly involved in providing care):   Patient Care Team:  JULIAN Arzola CNP as PCP - General (Family Medicine)  JULIAN Arzola CNP as PCP - [facility] Empaneled Provider  MD Amrit as Consulting Physician (Gastroenterology)  Conner Hernandez MD as Consulting Physician (Cardiology)  Jose Cannon MD as Consulting Physician (Pulmonology)  Hyun Youssef MD as Consulting Physician (Urology)    Wt Readings from Last 3 Encounters:   10/21/21 190 lb (86.2 kg)   08/09/21 189 lb (85.7 kg)   07/28/21 190 lb 12.8 oz (86.5 kg)     Vitals:    10/21/21 1421   BP: 102/65   Site: Left Upper Arm   Position: Sitting   Pulse: 70   Temp: 97.2 °F (36.2 °C)   TempSrc: Temporal   SpO2: 100%   Weight: 190 lb (86.2 kg)   Height: 5' 10\" (1.778 m)     Body mass index is 27.26 kg/m². Based upon direct observation of the patient, evaluation of cognition reveals recent and remote memory intact. Patient's complete Health Risk Assessment and screening values have been reviewed and are found in Flowsheets. The following problems were reviewed today and where indicated follow up appointments were made and/or referrals ordered. Positive Risk Factor Screenings with Interventions:     Fall Risk:  Timed Up and Go Test > 12 seconds?  (Complete if either Fall Risk answers are Yes): (!) yes  Fall with injury in past year?: no  Fall Risk Interventions:    · Home safety tips instructions/AVS.  . Recommended screening schedule for the next 5-10 years is provided to the patient in written form: see Patient Instructions/AVS.    Rebecca Rivero was seen today for medicare awv and urinary frequency. Diagnoses and all orders for this visit:    Urinary frequency  -     POCT Urinalysis No Micro (Auto)    ACP (advance care planning)    Routine general medical examination at a health care facility                   [facility]: Discussed the patients choices for care and treatment in case of a health event that adversely affects decision-making abilities. Also discussed the patients long-term treatment options. Reviewed with the patient the [address] [place] [address] of [address] [place] [address] Declaration forms  Reviewed the process of designating a competent adult as an Agent (or -in-fact) that could take make health care decisions for the patient if incompetent. Patient was asked to complete the declaration forms, either acknowledge the forms by a public notary or an eligible witness and provide a signed copy to the practice office.   Time spent (minutes): 10

## 2021-10-21 NOTE — PATIENT INSTRUCTIONS
Personalized Preventive Plan for Jesús Irizarry - 10/21/2021  Medicare offers a range of preventive health benefits. Some of the tests and screenings are paid in full while other may be subject to a deductible, co-insurance, and/or copay. Some of these benefits include a comprehensive review of your medical history including lifestyle, illnesses that may run in your family, and various assessments and screenings as appropriate. After reviewing your medical record and screening and assessments performed today your provider may have ordered immunizations, labs, imaging, and/or referrals for you. A list of these orders (if applicable) as well as your Preventive Care list are included within your After Visit Summary for your review. Other Preventive Recommendations:    · A preventive eye exam performed by an eye specialist is recommended every 1-2 years to screen for glaucoma; cataracts, macular degeneration, and other eye disorders. · A preventive dental visit is recommended every 6 months. · Try to get at least 150 minutes of exercise per week or 10,000 steps per day on a pedometer . · Order or download the FREE \"Exercise & Physical Activity: Your Everyday Guide\" from [employer]. Call 7-784.125.3786 or search [employer] online. · You need 4868-0663 mg of calcium and 2801-7741 IU of vitamin D per day. It is possible to meet your calcium requirement with diet alone, but a vitamin D supplement is usually necessary to meet this goal.  · When exposed to the sun, use a sunscreen that protects against both UVA and UVB radiation with an SPF of 30 or greater. Reapply every 2 to 3 hours or after sweating, drying off with a towel, or swimming. · Always wear a seat belt when traveling in a car. Always wear a helmet when riding a bicycle or motorcycle.

## 2021-10-23 LAB
CULTURE: NORMAL
Lab: NORMAL
SPECIMEN DESCRIPTION: NORMAL

## 2021-11-10 ENCOUNTER — HOSPITAL ENCOUNTER (OUTPATIENT)
Dept: GENERAL RADIOLOGY | Age: 76
Discharge: HOME | End: 2021-11-12
Payer: MEDICARE

## 2021-11-10 ENCOUNTER — HOSPITAL ENCOUNTER (OUTPATIENT)
Age: 76
Discharge: HOME | End: 2021-11-12
Payer: MEDICARE

## 2021-11-10 ENCOUNTER — OFFICE VISIT (OUTPATIENT)
Dept: PRIMARY CARE CLINIC | Age: 76
End: 2021-11-10
Payer: MEDICARE

## 2021-11-10 VITALS
TEMPERATURE: 97.4 F | WEIGHT: 193.6 LBS | BODY MASS INDEX: 27.78 KG/M2 | DIASTOLIC BLOOD PRESSURE: 69 MMHG | OXYGEN SATURATION: 100 % | SYSTOLIC BLOOD PRESSURE: 114 MMHG | HEART RATE: 71 BPM

## 2021-11-10 DIAGNOSIS — J44.1 COPD EXACERBATION (HCC): ICD-10-CM

## 2021-11-10 DIAGNOSIS — G25.81 RLS (RESTLESS LEGS SYNDROME): ICD-10-CM

## 2021-11-10 DIAGNOSIS — G62.9 NEUROPATHY: ICD-10-CM

## 2021-11-10 DIAGNOSIS — Z76.0 MEDICATION REFILL: ICD-10-CM

## 2021-11-10 DIAGNOSIS — I48.91 ATRIAL FIBRILLATION WITH SLOW VENTRICULAR RESPONSE (HCC): ICD-10-CM

## 2021-11-10 DIAGNOSIS — R04.2 HEMOPTYSIS: ICD-10-CM

## 2021-11-10 DIAGNOSIS — I50.32 CHRONIC DIASTOLIC HEART FAILURE (HCC): Primary | ICD-10-CM

## 2021-11-10 DIAGNOSIS — K59.00 CONSTIPATION, UNSPECIFIED CONSTIPATION TYPE: ICD-10-CM

## 2021-11-10 DIAGNOSIS — J43.9 PULMONARY EMPHYSEMA, UNSPECIFIED EMPHYSEMA TYPE (HCC): ICD-10-CM

## 2021-11-10 PROBLEM — E87.70 FLUID OVERLOAD: Status: RESOLVED | Noted: 2020-07-01 | Resolved: 2021-11-10

## 2021-11-10 PROCEDURE — G8427 DOCREV CUR MEDS BY ELIG CLIN: HCPCS | Performed by: NURSE PRACTITIONER

## 2021-11-10 PROCEDURE — 3023F SPIROM DOC REV: CPT | Performed by: NURSE PRACTITIONER

## 2021-11-10 PROCEDURE — G8926 SPIRO NO PERF OR DOC: HCPCS | Performed by: NURSE PRACTITIONER

## 2021-11-10 PROCEDURE — 3017F COLORECTAL CA SCREEN DOC REV: CPT | Performed by: NURSE PRACTITIONER

## 2021-11-10 PROCEDURE — 99214 OFFICE O/P EST MOD 30 MIN: CPT | Performed by: NURSE PRACTITIONER

## 2021-11-10 PROCEDURE — 4040F PNEUMOC VAC/ADMIN/RCVD: CPT | Performed by: NURSE PRACTITIONER

## 2021-11-10 PROCEDURE — 1036F TOBACCO NON-USER: CPT | Performed by: NURSE PRACTITIONER

## 2021-11-10 PROCEDURE — 71046 X-RAY EXAM CHEST 2 VIEWS: CPT

## 2021-11-10 PROCEDURE — 1123F ACP DISCUSS/DSCN MKR DOCD: CPT | Performed by: NURSE PRACTITIONER

## 2021-11-10 PROCEDURE — G8417 CALC BMI ABV UP PARAM F/U: HCPCS | Performed by: NURSE PRACTITIONER

## 2021-11-10 PROCEDURE — G8484 FLU IMMUNIZE NO ADMIN: HCPCS | Performed by: NURSE PRACTITIONER

## 2021-11-10 RX ORDER — ROPINIROLE 0.25 MG/1
TABLET, FILM COATED ORAL
Qty: 56 TABLET | Refills: 3 | Status: SHIPPED | OUTPATIENT
Start: 2021-11-10 | End: 2021-11-15

## 2021-11-10 RX ORDER — AZITHROMYCIN 250 MG/1
250 TABLET, FILM COATED ORAL SEE ADMIN INSTRUCTIONS
Qty: 6 TABLET | Refills: 0 | Status: SHIPPED | OUTPATIENT
Start: 2021-11-10 | End: 2021-11-15

## 2021-11-10 RX ORDER — GABAPENTIN 300 MG/1
300 CAPSULE ORAL 3 TIMES DAILY
Qty: 84 CAPSULE | Refills: 3 | Status: ON HOLD
Start: 2021-11-10 | End: 2022-01-10 | Stop reason: HOSPADM

## 2021-11-10 RX ORDER — DOCUSATE SODIUM 100 MG/1
100 CAPSULE, LIQUID FILLED ORAL 2 TIMES DAILY PRN
Qty: 60 CAPSULE | Refills: 0 | Status: SHIPPED | OUTPATIENT
Start: 2021-11-10 | End: 2021-12-10

## 2021-11-10 RX ORDER — PREDNISONE 20 MG/1
20 TABLET ORAL 2 TIMES DAILY
Qty: 10 TABLET | Refills: 0 | Status: SHIPPED | OUTPATIENT
Start: 2021-11-10 | End: 2021-11-15

## 2021-11-10 ASSESSMENT — ENCOUNTER SYMPTOMS
NAUSEA: 1
RHINORRHEA: 1
SHORTNESS OF BREATH: 1
VOMITING: 0
HEMOPTYSIS: 1
BLOOD IN STOOL: 0
BACK PAIN: 0
ABDOMINAL PAIN: 0
SORE THROAT: 0
TROUBLE SWALLOWING: 0
DIARRHEA: 0
WHEEZING: 0
DIFFICULTY BREATHING: 1
COUGH: 1

## 2021-11-10 ASSESSMENT — COPD QUESTIONNAIRES: COPD: 1

## 2021-11-10 NOTE — PROGRESS NOTES
[facility]  [id number] [facility] [address]  Dept: [phone]  Dept Fax: [phone]    Patient Care Team:  JULIAN Tellez CNP as PCP - General (Family Medicine)  JULIAN Tellez CNP as PCP - [facility] Shelley Bowers Provider  [facility] [address] [facility] MD Lesia as Consulting Physician (Gastroenterology)  Parmjit Phelan MD as Consulting Physician (Cardiology)  Samuel Webb MD as Consulting Physician (Pulmonology)  Odessa Montemayor MD as Consulting Physician (Urology)    11/10/2021     Jet Leos (:  1945)is a 77 y.o. male, here for evaluation of the following medical concerns:   Chief Complaint   Patient presents with    3 Month Follow-Up     CHF, COPD    Ear Fullness     Left       Saw Dr Jarett Hernadez who advised to see [facility] bariatrics. Referral placed, has not yet been scheduled. Saw Dr Johnathan Munoz, his pulmonologist who he last saw back in July, goes back in February. Has been SOB since he was sick last summer. Alan Power states however over the last few days has been more short of breath. Has noted some blood-tinged sputum. Denies rapid weight gain, denies increase swelling of the legs. Drowsiness did improve with switching allergy pill at bedtime, but feels medication wears off during the day    COPD  He complains of cough, difficulty breathing, hemoptysis and shortness of breath. There is no wheezing. The problem has been gradually worsening. The cough is productive of blood-tinged sputum. Associated symptoms include dyspnea on exertion, a fever and rhinorrhea (clear). Pertinent negatives include no chest pain, headaches, nasal congestion, sore throat or trouble swallowing. Congestive Heart Failure  Presents for follow-up visit. Associated symptoms include edema and shortness of breath.  Pertinent negatives include no abdominal pain, chest pain, near-syncope or JULIAN Tellez CNP   pantoprazole (PROTONIX) 40 MG tablet Take 1 tablet by mouth daily Yes JULIAN Tellez CNP   rivaroxaban (XARELTO) 20 MG TABS tablet TAKE 1 TABLET BY MOUTH DAILY Yes JULIAN Tellez CNP   bumetanide (BUMEX) 1 MG tablet Take 2 mg of Bumex in AM, 1 mg of Bumex in afternoon, take 1 mg in PM if weight is >3 above baseline weight Yes JULIAN Tellez CNP   tiZANidine (ZANAFLEX) 4 MG tablet TAKE 1 TABLET BY MOUTH DAILY AS NEEDED Yes JULIAN Tellez CNP   fluticasone (FLONASE) 50 MCG/ACT nasal spray 2 sprays by Nasal route daily Yes Benjamin Arredondo PA-C   Fluticasone furoate-vilanterol (BREO ELLIPTA) 200-25 MCG/INH AEPB inhaler INHALE 1 PUFF INTO THE LUNGS DAILY **RINSE MOUTH AFTER EACH USE** Yes Historical Provider, MD   Nutritional Supplements (BOOST) LIQD 1 can twice a day for meals Yes JULIAN Tellez CNP   Multiple Vitamin (MULTI-VITAMINS) TABS Take 1 tablet by mouth daily Yes JULIAN Tellez CNP   potassium chloride (KLOR-CON M) 20 MEQ extended release tablet Take 1 tablet by mouth 2 times daily Yes JULIAN Tellez CNP   cetirizine (ZYRTEC) 10 MG tablet Take 1 tablet by mouth daily Yes JULIAN Tellez CNP   loratadine (CLARITIN) 10 MG tablet Take 1 tablet by mouth daily Yes JULIAN Tellez CNP   albuterol sulfate HFA (VENTOLIN HFA) 108 (90 Base) MCG/ACT inhaler Inhale 2 puffs into the lungs every 6 hours as needed for Wheezing or Shortness of Breath Yes Historical Provider, MD   benzonatate (TESSALON) 100 MG capsule TAKE 1 CAPSULE BY MOUTH THREE TIMES DAILY AS NEEDED FOR COUGH Yes Debbie Diamond PA-C   vitamin B-12 (CYANOCOBALAMIN) 100 MCG tablet TAKE 1 TABLET BY MOUTH DAILY AS NEEDED FOR FATIGUE Yes Debbie Diamond PA-C   carboxymethylcellulose (REFRESH PLUS) 0.5 % SOLN ophthalmic solution Apply 1 drop to eye 4 times daily Yes Historical Provider, MD   Menthol-Methyl Salicylate (THERA-GESIC) 0.5-15 % CREA Apply topically 2 times daily as needed Yes Historical Provider, MD   ascorbic acid (VITAMIN C) 500 MG tablet Take 1 tablet by mouth daily Yes Kelsey Szymanski PA-C   ferrous sulfate (FE TABS 325) 325 (65 Fe) MG EC tablet Take 1 tablet by mouth 2 times daily (with meals) Yes Berlin Henderson PA-C   SM LUBRICANT EYE DROPS 0.4-0.3 % ophthalmic solution PLACE 1 DROP INTO BOTH EYES FOUR TIMES DAILY AS NEEDED FOR DRY EYES Yes Kelsey Szymanski PA-C   Handicap Placard MISC by Does not apply route Yes Kelsey Szymanski PA-C   Incontinence Supply Disposable (INCONTINENCE BRIEF LARGE) MISC To use as directed. Use 3x a day Yes Kelsey Szymanski PA-C   Foot Care Products (TRI-BALANCE ORTHOTICS MENS) MISC Provide insurance covered orthotics shoes, wear daily Yes Kelsey Szymanski PA-C   ipratropium-albuterol (DUONEB) 0.5-2.5 (3) MG/3ML SOLN nebulizer solution Inhale 1 vial into the lungs every 4 hours as needed  Yes Historical Provider, MD   Armodafinil 200 MG TABS Take 1 tablet by mouth 2 times daily. Yes Historical Provider, MD        Social History     Tobacco Use    Smoking status: Former Smoker     Packs/day: 1.00     Years: 20.00     Pack years: 20.00     Quit date: 1976     Years since quittin.9    Smokeless tobacco: Never Used   Substance Use Topics    Alcohol use: No        Vitals:    11/10/21 1446   BP: 114/69   Site: Left Upper Arm   Position: Sitting   Pulse: 71   Temp: 97.4 °F (36.3 °C)   TempSrc: Temporal   SpO2: 100%   Weight: 193 lb 9.6 oz (87.8 kg)     Estimated body mass index is 27.78 kg/m² as calculated from the following:    Height as of 10/21/21: 5' 10\" (1.778 m). Weight as of this encounter: 193 lb 9.6 oz (87.8 kg).     DIAGNOSTIC FINDINGS:  CBC:  Lab Results   Component Value Date    WBC 7.2 2021    HGB 12.3 2021     2021     2011       BMP:    Lab Results   Component Value Date     2021    K 3.7 2021    CL 97 2021 CO2 27 08/09/2021    BUN 17 08/09/2021    CREATININE 0.91 08/09/2021    GLUCOSE 96 08/09/2021    GLUCOSE 99 09/13/2011       HEMOGLOBIN A1C: No results found for: LABA1C    FASTING LIPID PANEL:  Lab Results   Component Value Date    CHOL 157 07/06/2018    HDL 72 04/01/2021    TRIG 101 07/06/2018       Physical Exam  Vitals reviewed. Constitutional:       Appearance: Normal appearance. He is well-developed and well-groomed. He is not ill-appearing or toxic-appearing. HENT:      Head: Normocephalic and atraumatic. Right Ear: External ear normal.      Left Ear: External ear normal.      Nose: Nose normal.   Eyes:      General: Lids are normal. No scleral icterus. Conjunctiva/sclera: Conjunctivae normal.      Pupils: Pupils are equal, round, and reactive to light. Cardiovascular:      Rate and Rhythm: Normal rate and regular rhythm. Heart sounds: Normal heart sounds. Pulmonary:      Effort: Pulmonary effort is normal.      Breath sounds: No decreased air movement. Decreased breath sounds present. No wheezing or rhonchi. Abdominal:      Palpations: Abdomen is soft. There is no mass. Tenderness: There is no abdominal tenderness. There is no right CVA tenderness or left CVA tenderness. Musculoskeletal:         General: No tenderness. Right lower leg: Edema present. Left lower leg: Edema present. Skin:     General: Skin is warm and dry. Findings: Bruising present. Neurological:      Mental Status: He is alert and oriented to person, place, and time. Psychiatric:         Behavior: Behavior is cooperative. ASSESSMENT     Diagnosis Orders   1. Chronic diastolic heart failure (HCC)     2. Pulmonary emphysema, unspecified emphysema type (HCC)  XR CHEST STANDARD (2 VW)   3. Atrial fibrillation with slow ventricular response ([address]     4. Medication refill  gabapentin (NEURONTIN) 300 MG capsule    rOPINIRole (REQUIP) 0.25 MG tablet   5.  Neuropathy  gabapentin (NEURONTIN) 300 MG capsule   6. RLS (restless legs syndrome)  rOPINIRole (REQUIP) 0.25 MG tablet   7. Hemoptysis  XR CHEST STANDARD (2 VW)   8. COPD exacerbation (HCC)  azithromycin (ZITHROMAX) 250 MG tablet    predniSONE (DELTASONE) 20 MG tablet   9. Constipation, unspecified constipation type  docusate sodium (COLACE) 100 MG capsule          PLAN:  Orders Placed This Encounter   Medications    gabapentin (NEURONTIN) 300 MG capsule     Sig: Take 1 capsule by mouth 3 times daily for 28 days. Dispense:  84 capsule     Refill:  3     \    rOPINIRole (REQUIP) 0.25 MG tablet     Sig: TAKE 1 TABLET BY MOUTH TWICE DAILY     Dispense:  56 tablet     Refill:  3    azithromycin (ZITHROMAX) 250 MG tablet     Sig: Take 1 tablet by mouth See Admin Instructions for 5 days 500mg on day 1 followed by 250mg on days 2 - 5     Dispense:  6 tablet     Refill:  0    predniSONE (DELTASONE) 20 MG tablet     Sig: Take 1 tablet by mouth 2 times daily for 5 days     Dispense:  10 tablet     Refill:  0    docusate sodium (COLACE) 100 MG capsule     Sig: Take 1 capsule by mouth 2 times daily as needed for Constipation     Dispense:  60 capsule     Refill:  0         1. Increase shortness of breath with fever in the last week. Possible viral illness versus COPD exacerbation. Low suspicion for CHF exacerbation. Weight remains stable, no increased swelling. Will evaluate with chest x-ray as patient has a history of aspiration pneumonia. Start prednisone and Z-Jj today. 2. Routine medications refilled. 3. Vaccinations reviewed, patient declines influenza vaccine. FOLLOW UP AND INSTRUCTIONS:  Return in about 3 months (around 2/10/2022) for copd, CHF. · María Dain received counseling on the following healthy behaviors:nutrition and medication adherence    · Discussed use, benefit, and side effects of prescribed medications. Barriers to  medication compliance addressed. All patient questions answered.   Pt  verbalized understanding of all instructions given. · Patient given educational materials - see patient instructions      · Patient advised to contact scheduling offices for any referrals or imaging orders  placed today if they have not been contacted in 48 hours. Return in about 3 months (around 2/10/2022) for copd, CHF. An electronic signature was used to authenticate this note. --JULIAN Arzola CNP on 11/10/2021 at 4:04 PM  Visit Information    Have you changed or started any medications since your last visit including any over-the-counter medicines, vitamins, or herbal medicines? no   Are you having any side effects from any of your medications? -  no  Have you stopped taking any of your medications? Is so, why? -  no    Have you seen any other physician or provider since your last visit? Yes - Records Obtained  Have you had any other diagnostic tests since your last visit? Yes - Records Obtained  Have you been seen in the emergency room and/or had an admission to a hospital since we last saw you? No  Have you had your routine dental cleaning in the past 6 months? no    Have you activated your [employer] account? If not, what are your barriers?  Yes     Patient Care Team:  JULIAN Arzola CNP as PCP - General (Family Medicine)  JULIAN Arzola CNP as PCP - [address] [place] [address] [facility]  [facility]  Yesi Santiago MD as Consulting Physician (Gastroenterology)  Conner Hernandez MD as Consulting Physician (Cardiology)  Jose Cannon MD as Consulting Physician (Pulmonology)  Hyun Youssef MD as Consulting Physician (Urology)    Medical History Review  Past Medical, Family, and Social History reviewed and does not contribute to the patient presenting condition    Health Maintenance   Topic Date Due    COVID-19 Vaccine (1) Never done    Shingles Vaccine (1 of 2) Never done    Flu vaccine (1) 06/30/2022 (Originally 9/1/2021)    Potassium monitoring  08/09/2022    Creatinine monitoring  08/09/2022    Annual Wellness Visit (AWV)  10/22/2022    Lipid screen  04/01/2026    DTaP/Tdap/Td vaccine (2 - Td or Tdap) 04/22/2029    Colon cancer screen colonoscopy  04/05/2031    Pneumococcal 65+ years Vaccine  Completed    AAA screen  Completed    Hepatitis C screen  Completed    Hepatitis A vaccine  Aged Out    Hepatitis B vaccine  Aged Out    Hib vaccine  Aged Out    Meningococcal (ACWY) vaccine  Aged Out

## 2021-11-11 DIAGNOSIS — G25.81 RLS (RESTLESS LEGS SYNDROME): ICD-10-CM

## 2021-11-11 DIAGNOSIS — Z76.0 MEDICATION REFILL: ICD-10-CM

## 2021-11-11 DIAGNOSIS — J30.2 SEASONAL ALLERGIES: ICD-10-CM

## 2021-11-12 DIAGNOSIS — K21.9 GASTROESOPHAGEAL REFLUX DISEASE WITHOUT ESOPHAGITIS: ICD-10-CM

## 2021-11-12 DIAGNOSIS — Z76.0 MEDICATION REFILL: ICD-10-CM

## 2021-11-12 NOTE — TELEPHONE ENCOUNTER
Health Maintenance   Topic Date Due    COVID-19 Vaccine (1) Never done    Shingles Vaccine (1 of 2) Never done    Flu vaccine (1) 06/30/2022 (Originally 9/1/2021)    Potassium monitoring  08/09/2022    Creatinine monitoring  08/09/2022    Annual Wellness Visit (AWV)  10/22/2022    Lipid screen  04/01/2026    DTaP/Tdap/Td vaccine (2 - Td or Tdap) 04/22/2029    Colon cancer screen colonoscopy  04/05/2031    Pneumococcal 65+ years Vaccine  Completed    AAA screen  Completed    Hepatitis C screen  Completed    Hepatitis A vaccine  Aged Out    Hepatitis B vaccine  Aged Out    Hib vaccine  Aged Out    Meningococcal (ACWY) vaccine  Aged Out             (applicable per patient's age: Cancer Screenings, Depression Screening, Fall Risk Screening, Immunizations)    LDL Cholesterol (mg/dL)   Date Value   04/01/2021 49     LDL Calculated (mg/dL)   Date Value   10/30/2013 67     AST (U/L)   Date Value   08/09/2021 22     ALT (U/L)   Date Value   08/09/2021 11     BUN (mg/dL)   Date Value   08/09/2021 17      (goal A1C is < 7)   (goal LDL is <100) need 30-50% reduction from baseline     BP Readings from Last 3 Encounters:   11/10/21 114/69   10/21/21 102/65   08/09/21 103/63    (goal /80)      All Future Testing planned in [facility]:  Lab Frequency Next Occurrence   Basic Metabolic Panel Once 79/51/6123       Next Visit Date:  Future Appointments   Date Time Provider Jeri Flynn   2/14/2022  1:30 PM JULIAN Mcbride CNP [facility]            Patient Active Problem List:     Chronic atrial fibrillation (HCC)     Dyslipidemia     Gastroesophageal reflux disease without esophagitis     Osteoarthritis of lumbar spine     OA (osteoarthritis) of knee, bilateral     Foot drop, right     Hallux valgus, acquired, bilateral     Hearing impairment     Essential hypertension     Slow transit constipation     MARIAMA on CPAP     Simple chronic bronchitis (HCC)     Non-intractable vomiting Hospital-acquired bacterial pneumonia     Pneumonia due to organism     COPD (chronic obstructive pulmonary disease) ([address]     Chronic diastolic heart failure (HCC)     History of bariatric surgery including banding leading to esophageal stricture and aspiration     BPH (benign prostatic hyperplasia)     Myalgia     Atrial fibrillation with slow ventricular response (HCC)     Pacemaker pocket hematoma, initial encounter     Pneumonia-community-acquired     Acute hypoxemic respiratory failure (HCC)     SIRS (systemic inflammatory response syndrome) ([address]     Pulmonary hypertension ([address]     Presence of permanent cardiac pacemaker     Aspiration pneumonia ([address]

## 2021-11-15 RX ORDER — CETIRIZINE HYDROCHLORIDE 10 MG/1
10 TABLET ORAL DAILY
Qty: 28 TABLET | Refills: 2 | Status: ON HOLD | OUTPATIENT
Start: 2021-11-15 | End: 2022-01-10

## 2021-11-15 RX ORDER — PANTOPRAZOLE SODIUM 40 MG/1
40 TABLET, DELAYED RELEASE ORAL DAILY
Qty: 28 TABLET | Refills: 3 | Status: SHIPPED | OUTPATIENT
Start: 2021-11-15

## 2021-11-15 RX ORDER — ROPINIROLE 0.25 MG/1
TABLET, FILM COATED ORAL
Qty: 56 TABLET | Refills: 3 | Status: ON HOLD | OUTPATIENT
Start: 2021-11-15 | End: 2022-02-01

## 2021-11-15 RX ORDER — BUMETANIDE 1 MG/1
TABLET ORAL
Qty: 112 TABLET | Refills: 2 | Status: ON HOLD | OUTPATIENT
Start: 2021-11-15 | End: 2022-01-10

## 2021-11-18 NOTE — TELEPHONE ENCOUNTER
Fax records to Demetrius Augustine :[phone],  egd results , last 3 office notes, op note if provided

## 2021-11-22 ENCOUNTER — TELEMEDICINE (OUTPATIENT)
Dept: PRIMARY CARE CLINIC | Age: 76
End: 2021-11-22
Payer: MEDICARE

## 2021-11-22 DIAGNOSIS — R42 DIZZINESS: ICD-10-CM

## 2021-11-22 DIAGNOSIS — R53.1 WEAKNESS: Primary | ICD-10-CM

## 2021-11-22 DIAGNOSIS — I95.9 HYPOTENSION, UNSPECIFIED HYPOTENSION TYPE: ICD-10-CM

## 2021-11-22 PROCEDURE — G8427 DOCREV CUR MEDS BY ELIG CLIN: HCPCS | Performed by: NURSE PRACTITIONER

## 2021-11-22 PROCEDURE — 99213 OFFICE O/P EST LOW 20 MIN: CPT | Performed by: NURSE PRACTITIONER

## 2021-11-22 PROCEDURE — 1123F ACP DISCUSS/DSCN MKR DOCD: CPT | Performed by: NURSE PRACTITIONER

## 2021-11-22 PROCEDURE — 4040F PNEUMOC VAC/ADMIN/RCVD: CPT | Performed by: NURSE PRACTITIONER

## 2021-11-22 PROCEDURE — 3017F COLORECTAL CA SCREEN DOC REV: CPT | Performed by: NURSE PRACTITIONER

## 2021-11-22 ASSESSMENT — ENCOUNTER SYMPTOMS
DIARRHEA: 0
SHORTNESS OF BREATH: 0
BLOOD IN STOOL: 0
BACK PAIN: 0
ABDOMINAL PAIN: 1
COLOR CHANGE: 0
SORE THROAT: 0
CHEST TIGHTNESS: 0
NAUSEA: 1
VOMITING: 1
CHANGE IN BOWEL HABIT: 1

## 2021-11-22 NOTE — PROGRESS NOTES
Bev Odell is a 77 y.o. male evaluated virtual visit via [address] on 2021. Consent:  He and/or health care decision maker is aware that that he may receive a bill for this telephone service, depending on his insurance coverage, and has provided verbal consent to proceed: Yes      Documentation:  I communicated with the patient and/or health care decision maker about weakness, low BP. Details of this discussion including any medical advice provided below      I affirm this is a Patient Initiated Episode with an Established Patient who has not had a related appointment within my department in the past 7 days or scheduled within the next 24 hours. Total Time: minutes: 11-20 minutes    Note: not billable if this call serves to triage the patient into an appointment for the relevant concern      JULIAN Banks CNP                  2021    TELEHEALTH EVALUATION -- Audio/Visual (During CLMET-54 public health emergency)    Patient and physician are located in their individual homes    HPI:    Bev Odell (:  1945) has requested an audio only/video evaluation for the following concern(s):        Safia Bran presents today for sudden onset weakness and nausea. He noticed decreased urinary output yesterday, about 100 cc that was rust colored. No dysuria, hematuria or fever. No increased frequency or urgency. Urination is better today, over 200 cc out and clear to pale yellow. He had to loose stools between yesterday and today  + lower abd pain across both sides, no flank pain. BP was 50/36 this AM, recheck later was 117/50's. He normally runs 100's SBP and 70's DBP. Pox 97%, HR in 70's      Fatigue  This is a new problem. The current episode started yesterday. The problem has been gradually improving. Associated symptoms include abdominal pain, a change in bowel habit, fatigue, nausea, vomiting (dry heaving) and weakness.  Pertinent negatives include no chest pain, chills, DAILY WITH MEALS Yes Elva JULIAN Hussein CNP   metoprolol succinate (TOPROL XL) 50 MG extended release tablet TAKE 1 TABLET BY MOUTH AT BEDTIME Yes Elva JULIAN Hussein CNP   rivaroxaban (XARELTO) 20 MG TABS tablet TAKE 1 TABLET BY MOUTH DAILY Yes Elva JULIAN Hussein - CNP   tiZANidine (ZANAFLEX) 4 MG tablet TAKE 1 TABLET BY MOUTH DAILY AS NEEDED Yes Elva JULIAN Hussein CNP   fluticasone (FLONASE) 50 MCG/ACT nasal spray 2 sprays by Nasal route daily Yes Trevon No PA-C   Fluticasone furoate-vilanterol (BREO ELLIPTA) 200-25 MCG/INH AEPB inhaler INHALE 1 PUFF INTO THE LUNGS DAILY **RINSE MOUTH AFTER EACH USE** Yes Historical Provider, MD   Nutritional Supplements (BOOST) LIQD 1 can twice a day for meals Yes Elva JULIAN Hussein CNP   Multiple Vitamin (MULTI-VITAMINS) TABS Take 1 tablet by mouth daily Yes Elva JULIAN Hussein CNP   potassium chloride (KLOR-CON M) 20 MEQ extended release tablet Take 1 tablet by mouth 2 times daily Yes Elva JULIAN Hussein CNP   loratadine (CLARITIN) 10 MG tablet Take 1 tablet by mouth daily Yes Elva JULIAN Hussein CNP   albuterol sulfate HFA (VENTOLIN HFA) 108 (90 Base) MCG/ACT inhaler Inhale 2 puffs into the lungs every 6 hours as needed for Wheezing or Shortness of Breath Yes Historical Provider, MD   benzonatate (TESSALON) 100 MG capsule TAKE 1 CAPSULE BY MOUTH THREE TIMES DAILY AS NEEDED FOR COUGH Yes Eileen Sharma PA-C   vitamin B-12 (CYANOCOBALAMIN) 100 MCG tablet TAKE 1 TABLET BY MOUTH DAILY AS NEEDED FOR FATIGUE Yes Eileen Sharma PA-C   carboxymethylcellulose (REFRESH PLUS) 0.5 % SOLN ophthalmic solution Apply 1 drop to eye 4 times daily Yes Historical Provider, MD   Menthol-Methyl Salicylate (THERA-GESIC) 0.5-15 % CREA Apply topically 2 times daily as needed Yes Historical Provider, MD   ascorbic acid (VITAMIN C) 500 MG tablet Take 1 tablet by mouth daily Yes Eileen Sharma PA-C   ferrous sulfate (FE TABS 325) 325 (65 Fe) MG EC tablet Take 1 tablet by mouth 2 times daily (with meals) Yes Shantelle Henderson PA-C   SM LUBRICANT EYE DROPS 0.4-0.3 % ophthalmic solution PLACE 1 DROP INTO BOTH EYES FOUR TIMES DAILY AS NEEDED FOR DRY EYES Yes Cosme Vee PA-C   Handicap Placard MISC by Does not apply route Yes Cosme Vee PA-C   Incontinence Supply Disposable (INCONTINENCE BRIEF LARGE) MISC To use as directed. Use 3x a day Yes Cosme Vee PA-C   Foot Care Products (TRI-BALANCE ORTHOTICS MENS) MISC Provide insurance covered orthotics shoes, wear daily Yes Cosme Vee PA-C   ipratropium-albuterol (DUONEB) 0.5-2.5 (3) MG/3ML SOLN nebulizer solution Inhale 1 vial into the lungs every 4 hours as needed  Yes Historical Provider, MD   Armodafinil 200 MG TABS Take 1 tablet by mouth 2 times daily.   Yes Historical Provider, MD       Social History     Tobacco Use    Smoking status: Former Smoker     Packs/day: 1.00     Years: 20.00     Pack years: 20.00     Quit date: 1976     Years since quittin.9    Smokeless tobacco: Never Used   Vaping Use    Vaping Use: Never used   Substance Use Topics    Alcohol use: No    Drug use: No        Past Medical History:   Diagnosis Date    Acute superficial gastritis without hemorrhage 2018    Anastomotic stricture of stomach     Asthma     Atrial fibrillation ([facility]     On Xarelto 2020    Calculus of gallbladder without cholecystitis without obstruction 2017    Chronic diastolic heart failure ([facility] 2017    Chronic rhinosinusitis 2015    Class 2 severe obesity due to excess calories with serious comorbidity and body mass index (BMI) of 36.0 to 36.9 in adult [facility]) 2017    COPD (chronic obstructive pulmonary disease) ([facility]     E. coli septicemia (HCC)     E. coli UTI     Foot drop, right 07/10/2012    Fracture of femur ([facility] 10/23/2016    Gait disorder 2016    Gastric out let obstruction     GERD (gastroesophageal reflux disease)     Hallux valgus, acquired, bilateral 06/24/2015    Hyperlipidemia     Hypertension     Impaired hearing 04/13/2015    Internal hemorrhoids 01/03/2017    Lymphedema of both lower extremities 06/24/2015    Nausea & vomiting 11/16/2018    OA (osteoarthritis) of knee, bilateral 12/11/2006    Obesity     MARIAMA on CPAP 05/02/2017    Osteoarthritis     Osteoarthritis of lumbar spine 12/13/2007     replace inactive diagnosis    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 coli (HCC)     Due to UTI     Sick sinus syndrome (HCC)     Simple chronic bronchitis (HCC) 04/10/2018    Slow transit constipation 11/03/2016    Unspecified sleep apnea     UTI (urinary tract infection)             CBC:  Lab Results   Component Value Date    WBC 7.2 08/09/2021    HGB 12.3 08/09/2021     08/09/2021     09/13/2011       BMP:    Lab Results   Component Value Date     08/09/2021    K 3.7 08/09/2021    CL 97 08/09/2021    CO2 27 08/09/2021    BUN 17 08/09/2021    CREATININE 0.91 08/09/2021    GLUCOSE 96 08/09/2021    GLUCOSE 99 09/13/2011       HEMOGLOBIN A1C: No results found for: LABA1C    FASTING LIPID PANEL:  Lab Results   Component Value Date    CHOL 157 07/06/2018    HDL 72 04/01/2021    TRIG 101 07/06/2018         RECORD REVIEW: Previous medical records were reviewed at today's visit. PHYSICAL EXAMINATION:    Vital Signs: (As obtained by patient/caregiver at home)    No flowsheet data found. Physical Exam  Constitutional:       Appearance: Normal appearance. He is not ill-appearing, toxic-appearing or diaphoretic. HENT:      Head: Normocephalic. Right Ear: External ear normal.      Left Ear: External ear normal.      Nose: Nose normal.      Mouth/Throat:      Mouth: Mucous membranes are moist.   Eyes:      General: No scleral icterus. Right eye: No discharge. Left eye: No discharge.       Conjunctiva/sclera: Conjunctivae normal.   Pulmonary: Effort: Pulmonary effort is normal.   Musculoskeletal:      Cervical back: Normal range of motion. Skin:     Coloration: Skin is not jaundiced. Neurological:      Mental Status: He is alert and oriented to person, place, and time. Psychiatric:         Mood and Affect: Mood normal.         Behavior: Behavior normal.         Thought Content: Thought content normal.           Other pertinent observable physical exam findings:- Pt speaking in complete sentences, does not appear lethargic and answering question appropriately. He is [place] and his daughter is present to assist with VV. RECORD REVIEW: Previous medical records were reviewed at today's visit. The past family, medical and social histories were reviewed and unchanged with the exceptions of what is mentioned in this note. Due to this being a TeleHealth encounter, evaluation of the following organ systems is limited: Vitals/Constitutional/EENT/Resp/CV/GI//MS/Neuro/Skin/Heme-Lymph-Imm. ASSESSMENT/PLAN:  Susan Richardson was seen today for urinary tract infection. Diagnoses and all orders for this visit:    Weakness  -     Basic Metabolic Panel; Future  -     Urinalysis With Microscopic; Future    Dizziness  -     Basic Metabolic Panel; Future  -     Urinalysis With Microscopic; Future    Hypotension, unspecified hypotension type    Symptoms appear to be improving. Possible dehydration secondary to gastroenteritis. Pt to come to lab tomorrow for urine sample and BMP is not continuing to improve over the next 12-24 hours. Patient and daughter verbalized understanding. C/W clear liquid diet as tolerated today. No follow-ups on file. An  electronic signature was used to authenticate this note.     --JULIAN Coleman - CNP on 11/22/2021 at 4:27 PM    This note is created with the assistance of a speech-recognition program. While intending to generate a document that actually reflects the content of the visit, the document can still have some mistakes which may not have been identified and corrected by editing. 9    Pursuant to the emergency declaration under the [facility], [facility] waiver authority and the [employer] Act, this Virtual  Visit was conducted, with patient's consent, to reduce the patient's risk of exposure to COVID-19 and provide continuity of care for an established patient. Services were provided through a video synchronous discussion virtually to substitute for in-person clinic visit.

## 2021-11-22 NOTE — PROGRESS NOTES
Visit Information    Have you changed or started any medications since your last visit including any over-the-counter medicines, vitamins, or herbal medicines? no   Are you having any side effects from any of your medications? -  no  Have you stopped taking any of your medications? Is so, why? -  no    Have you seen any other physician or provider since your last visit? No  Have you had any other diagnostic tests since your last visit? No  Have you been seen in the emergency room and/or had an admission to a hospital since we last saw you? No  Have you had your routine dental cleaning in the past 6 months? no    Have you activated your [employer] account? If not, what are your barriers?  Yes     Patient Care Team:  JULIAN Farfan CNP as PCP - General (Family Medicine)  JULIAN Farfan CNP as PCP - [address]  Arlene Bond MD as Consulting Physician (Gastroenterology)  Nelda Owens MD as Consulting Physician (Cardiology)  Camden Avilez MD as Consulting Physician (Pulmonology)  Randee Summers MD as Consulting Physician (Urology)    Medical History Review  Past Medical, Family, and Social History reviewed and does not contribute to the patient presenting condition    Health Maintenance   Topic Date Due    COVID-19 Vaccine (1) Never done    Shingles Vaccine (1 of 2) Never done    Flu vaccine (1) 06/30/2022 (Originally 9/1/2021)    Potassium monitoring  08/09/2022    Creatinine monitoring  08/09/2022    Annual Wellness Visit (AWV)  10/22/2022    Lipid screen  04/01/2026    DTaP/Tdap/Td vaccine (2 - Td or Tdap) 04/22/2029    Colon cancer screen colonoscopy  04/05/2031    Pneumococcal 65+ years Vaccine  Completed    AAA screen  Completed    Hepatitis C screen  Completed    Hepatitis A vaccine  Aged Out    Hepatitis B vaccine  Aged Out    Hib vaccine  Aged Out    Meningococcal (ACWY) vaccine  Aged Out

## 2021-11-23 ENCOUNTER — HOSPITAL ENCOUNTER (OUTPATIENT)
Age: 76
Discharge: HOME | End: 2021-11-23
Payer: MEDICARE

## 2021-11-23 DIAGNOSIS — R53.1 WEAKNESS: ICD-10-CM

## 2021-11-23 DIAGNOSIS — R42 DIZZINESS: ICD-10-CM

## 2021-11-23 LAB
-: ABNORMAL
AMORPHOUS: ABNORMAL
ANION GAP SERPL CALCULATED.3IONS-SCNC: 14 MMOL/L (ref 9–17)
BACTERIA: ABNORMAL
BILIRUBIN URINE: NEGATIVE
BUN BLDV-MCNC: 18 MG/DL (ref 8–23)
BUN/CREAT BLD: NORMAL (ref 9–20)
CALCIUM SERPL-MCNC: 8.9 MG/DL (ref 8.6–10.4)
CASTS UA: ABNORMAL /LPF (ref 0–2)
CASTS UA: ABNORMAL /LPF (ref 0–2)
CHLORIDE BLD-SCNC: 102 MMOL/L (ref 98–107)
CO2: 24 MMOL/L (ref 20–31)
COLOR: YELLOW
CREAT SERPL-MCNC: 0.98 MG/DL (ref 0.7–1.2)
CRYSTALS, UA: ABNORMAL /HPF
EPITHELIAL CELLS UA: ABNORMAL /HPF (ref 0–5)
GFR AFRICAN AMERICAN: >60 ML/MIN
GFR NON-AFRICAN AMERICAN: >60 ML/MIN
GFR SERPL CREATININE-BSD FRML MDRD: NORMAL ML/MIN/{1.73_M2}
GFR SERPL CREATININE-BSD FRML MDRD: NORMAL ML/MIN/{1.73_M2}
GLUCOSE BLD-MCNC: 93 MG/DL (ref 70–99)
GLUCOSE URINE: NEGATIVE
KETONES, URINE: NEGATIVE
LEUKOCYTE ESTERASE, URINE: NEGATIVE
MUCUS: ABNORMAL
NITRITE, URINE: NEGATIVE
OTHER OBSERVATIONS UA: ABNORMAL
PH UA: 7 (ref 5–8)
POTASSIUM SERPL-SCNC: 4 MMOL/L (ref 3.7–5.3)
PROTEIN UA: NEGATIVE
RBC UA: ABNORMAL /HPF (ref 0–2)
RENAL EPITHELIAL, UA: ABNORMAL /HPF
SODIUM BLD-SCNC: 140 MMOL/L (ref 135–144)
SPECIFIC GRAVITY UA: 1.01 (ref 1–1.03)
TRICHOMONAS: ABNORMAL
TURBIDITY: CLEAR
URINE HGB: NEGATIVE
UROBILINOGEN, URINE: NORMAL
WBC UA: ABNORMAL /HPF (ref 0–5)
YEAST: ABNORMAL

## 2021-11-23 PROCEDURE — 80048 BASIC METABOLIC PNL TOTAL CA: CPT

## 2021-11-23 PROCEDURE — 81001 URINALYSIS AUTO W/SCOPE: CPT

## 2021-11-23 PROCEDURE — 36415 COLL VENOUS BLD VENIPUNCTURE: CPT

## 2021-12-10 DIAGNOSIS — K59.00 CONSTIPATION, UNSPECIFIED CONSTIPATION TYPE: ICD-10-CM

## 2021-12-10 DIAGNOSIS — Z76.0 MEDICATION REFILL: ICD-10-CM

## 2021-12-10 DIAGNOSIS — M79.10 MYALGIA: ICD-10-CM

## 2021-12-10 RX ORDER — DIPHENOXYLATE HYDROCHLORIDE AND ATROPINE SULFATE 2.5; .025 MG/1; MG/1
1 TABLET ORAL DAILY
Qty: 28 TABLET | Refills: 5 | Status: SHIPPED | OUTPATIENT
Start: 2021-12-10

## 2021-12-10 RX ORDER — DOCUSATE SODIUM 100 MG/1
CAPSULE, LIQUID FILLED ORAL
Qty: 60 CAPSULE | Refills: 0 | Status: ON HOLD | OUTPATIENT
Start: 2021-12-10 | End: 2022-01-10

## 2021-12-10 RX ORDER — TIZANIDINE 4 MG/1
TABLET ORAL
Qty: 28 TABLET | Refills: 3 | Status: ON HOLD
Start: 2021-12-10 | End: 2022-02-08 | Stop reason: HOSPADM

## 2021-12-10 RX ORDER — POTASSIUM CHLORIDE 20 MEQ/1
TABLET, EXTENDED RELEASE ORAL
Qty: 56 TABLET | Refills: 5 | Status: ON HOLD
Start: 2021-12-10 | End: 2022-02-08 | Stop reason: HOSPADM

## 2021-12-10 NOTE — TELEPHONE ENCOUNTER
Health Maintenance   Topic Date Due    COVID-19 Vaccine (1) Never done    Shingles Vaccine (1 of 2) Never done    Flu vaccine (1) 06/30/2022 (Originally 9/1/2021)    Annual Wellness Visit (AWV)  10/22/2022    Potassium monitoring  11/23/2022    Creatinine monitoring  11/23/2022    Lipid screen  04/01/2026    DTaP/Tdap/Td vaccine (2 - Td or Tdap) 04/22/2029    Colon cancer screen colonoscopy  04/05/2031    Pneumococcal 65+ years Vaccine  Completed    AAA screen  Completed    Hepatitis C screen  Completed    Hepatitis A vaccine  Aged Out    Hepatitis B vaccine  Aged Out    Hib vaccine  Aged Out    Meningococcal (ACWY) vaccine  Aged Out             (applicable per patient's age: Cancer Screenings, Depression Screening, Fall Risk Screening, Immunizations)    LDL Cholesterol (mg/dL)   Date Value   04/01/2021 49     LDL Calculated (mg/dL)   Date Value   10/30/2013 67     AST (U/L)   Date Value   08/09/2021 22     ALT (U/L)   Date Value   08/09/2021 11     BUN (mg/dL)   Date Value   11/23/2021 18      (goal A1C is < 7)   (goal LDL is <100) need 30-50% reduction from baseline     BP Readings from Last 3 Encounters:   11/10/21 114/69   10/21/21 102/65   08/09/21 103/63    (goal /80)      All Future Testing planned in [facility]:  Lab Frequency Next Occurrence   Basic Metabolic Panel Once 49/89/9835       Next Visit Date:  Future Appointments   Date Time Provider Jeri Flynn   2/14/2022  1:30 PM JULIAN Juárez CNP [facility]            Patient Active Problem List:     Chronic atrial fibrillation (HCC)     Dyslipidemia     Gastroesophageal reflux disease without esophagitis     Osteoarthritis of lumbar spine     OA (osteoarthritis) of knee, bilateral     Foot drop, right     Hallux valgus, acquired, bilateral     Hearing impairment     Essential hypertension     Slow transit constipation     MARIAMA on CPAP     Simple chronic bronchitis (HCC)     Non-intractable vomiting Hospital-acquired bacterial pneumonia     Pneumonia due to organism     COPD (chronic obstructive pulmonary disease) ([address]     Chronic diastolic heart failure (HCC)     History of bariatric surgery including banding leading to esophageal stricture and aspiration     BPH (benign prostatic hyperplasia)     Myalgia     Atrial fibrillation with slow ventricular response (HCC)     Pacemaker pocket hematoma, initial encounter     Pneumonia-community-acquired     Acute hypoxemic respiratory failure (HCC)     SIRS (systemic inflammatory response syndrome) ([address]     Pulmonary hypertension ([address]     Presence of permanent cardiac pacemaker     Aspiration pneumonia ([address]

## 2021-12-20 NOTE — TELEPHONE ENCOUNTER
Called radiology hub and they are making a disk for patient to , will have ready for patient by tomorrow

## 2021-12-20 NOTE — TELEPHONE ENCOUNTER
Patient called in because the imaging that was sent to the [facility] was not in color and they are requesting those images in color.

## 2021-12-27 ENCOUNTER — TELEPHONE (OUTPATIENT)
Dept: GASTROENTEROLOGY | Age: 76
End: 2021-12-27

## 2021-12-27 NOTE — TELEPHONE ENCOUNTER
Patient called back it needs to be sent to Bariatric Surgical office at [facility]: Deepti Umana phone [phone] . If you have any questions please call him at [phone] .  Thank You

## 2021-12-27 NOTE — TELEPHONE ENCOUNTER
Patient called stating they would need biopsy results/ pictures sent over to the [address] [place] [address], please advise.

## 2021-12-28 DIAGNOSIS — Z76.0 MEDICATION REFILL: ICD-10-CM

## 2021-12-28 DIAGNOSIS — R09.81 NASAL CONGESTION: ICD-10-CM

## 2021-12-28 NOTE — TELEPHONE ENCOUNTER
Health Maintenance   Topic Date Due    COVID-19 Vaccine (1) Never done    Shingles Vaccine (1 of 2) Never done    Flu vaccine (1) 06/30/2022 (Originally 9/1/2021)    Annual Wellness Visit (AWV)  10/22/2022    Potassium monitoring  11/23/2022    Creatinine monitoring  11/23/2022    Lipid screen  04/01/2026    DTaP/Tdap/Td vaccine (2 - Td or Tdap) 04/22/2029    Colon cancer screen colonoscopy  04/05/2031    Pneumococcal 65+ years Vaccine  Completed    AAA screen  Completed    Hepatitis C screen  Completed    Hepatitis A vaccine  Aged Out    Hepatitis B vaccine  Aged Out    Hib vaccine  Aged Out    Meningococcal (ACWY) vaccine  Aged Out             (applicable per patient's age: Cancer Screenings, Depression Screening, Fall Risk Screening, Immunizations)    LDL Cholesterol (mg/dL)   Date Value   04/01/2021 49     LDL Calculated (mg/dL)   Date Value   10/30/2013 67     AST (U/L)   Date Value   08/09/2021 22     ALT (U/L)   Date Value   08/09/2021 11     BUN (mg/dL)   Date Value   11/23/2021 18      (goal A1C is < 7)   (goal LDL is <100) need 30-50% reduction from baseline     BP Readings from Last 3 Encounters:   11/10/21 114/69   10/21/21 102/65   08/09/21 103/63    (goal /80)      All Future Testing planned in [facility]:  Lab Frequency Next Occurrence   Basic Metabolic Panel Once 04/64/5005       Next Visit Date:  Future Appointments   Date Time Provider Jeri Flynn   2/14/2022  1:30 PM JULIAN Juárez CNP [facility]            Patient Active Problem List:     Chronic atrial fibrillation (HCC)     Dyslipidemia     Gastroesophageal reflux disease without esophagitis     Osteoarthritis of lumbar spine     OA (osteoarthritis) of knee, bilateral     Foot drop, right     Hallux valgus, acquired, bilateral     Hearing impairment     Essential hypertension     Slow transit constipation     MARIAMA on CPAP     Simple chronic bronchitis (HCC)     Non-intractable vomiting Hospital-acquired bacterial pneumonia     Pneumonia due to organism     COPD (chronic obstructive pulmonary disease) ([address]     Chronic diastolic heart failure (HCC)     History of bariatric surgery including banding leading to esophageal stricture and aspiration     BPH (benign prostatic hyperplasia)     Myalgia     Atrial fibrillation with slow ventricular response (HCC)     Pacemaker pocket hematoma, initial encounter     Pneumonia-community-acquired     Acute hypoxemic respiratory failure (HCC)     SIRS (systemic inflammatory response syndrome) ([address]     Pulmonary hypertension ([address]     Presence of permanent cardiac pacemaker     Aspiration pneumonia ([address]

## 2021-12-29 RX ORDER — FLUTICASONE PROPIONATE 50 MCG
2 SPRAY, SUSPENSION (ML) NASAL DAILY
Qty: 16 G | Refills: 3 | Status: ON HOLD
Start: 2021-12-29 | End: 2022-02-08 | Stop reason: HOSPADM

## 2022-01-07 ENCOUNTER — APPOINTMENT (OUTPATIENT)
Dept: GENERAL RADIOLOGY | Age: 77
DRG: 389 | End: 2022-01-07
Payer: MEDICARE

## 2022-01-07 ENCOUNTER — APPOINTMENT (OUTPATIENT)
Dept: CT IMAGING | Age: 77
DRG: 389 | End: 2022-01-07
Payer: MEDICARE

## 2022-01-07 ENCOUNTER — HOSPITAL ENCOUNTER (INPATIENT)
Age: 77
LOS: 3 days | Discharge: HOME | DRG: 389 | End: 2022-01-10
Attending: STUDENT IN AN ORGANIZED HEALTH CARE EDUCATION/TRAINING PROGRAM | Admitting: INTERNAL MEDICINE
Payer: MEDICARE

## 2022-01-07 DIAGNOSIS — J30.2 SEASONAL ALLERGIES: ICD-10-CM

## 2022-01-07 DIAGNOSIS — Z76.0 MEDICATION REFILL: ICD-10-CM

## 2022-01-07 DIAGNOSIS — K56.609 SBO (SMALL BOWEL OBSTRUCTION) (HCC): Primary | ICD-10-CM

## 2022-01-07 DIAGNOSIS — K59.00 CONSTIPATION, UNSPECIFIED CONSTIPATION TYPE: ICD-10-CM

## 2022-01-07 DIAGNOSIS — I48.0 PAROXYSMAL ATRIAL FIBRILLATION (HCC): ICD-10-CM

## 2022-01-07 DIAGNOSIS — K86.89 PANCREATIC INSUFFICIENCY: ICD-10-CM

## 2022-01-07 LAB
ABSOLUTE EOS #: 0.1 K/UL (ref 0–0.44)
ABSOLUTE IMMATURE GRANULOCYTE: 0.02 K/UL (ref 0–0.3)
ABSOLUTE LYMPH #: 0.75 K/UL (ref 1.1–3.7)
ABSOLUTE MONO #: 0.56 K/UL (ref 0.1–1.2)
ALBUMIN SERPL-MCNC: 4.3 G/DL (ref 3.5–5.2)
ALBUMIN/GLOBULIN RATIO: ABNORMAL (ref 1–2.5)
ALP BLD-CCNC: 120 U/L (ref 40–129)
ALT SERPL-CCNC: 12 U/L (ref 5–41)
ANION GAP SERPL CALCULATED.3IONS-SCNC: 12 MMOL/L (ref 9–17)
AST SERPL-CCNC: 30 U/L
BASOPHILS # BLD: 0 % (ref 0–2)
BASOPHILS ABSOLUTE: 0.04 K/UL (ref 0–0.2)
BILIRUB SERPL-MCNC: 0.73 MG/DL (ref 0.3–1.2)
BILIRUBIN URINE: NEGATIVE
BNP INTERPRETATION: ABNORMAL
BUN BLDV-MCNC: 20 MG/DL (ref 8–23)
BUN/CREAT BLD: 21 (ref 9–20)
CALCIUM SERPL-MCNC: 9.7 MG/DL (ref 8.6–10.4)
CHLORIDE BLD-SCNC: 96 MMOL/L (ref 98–107)
CO2: 28 MMOL/L (ref 20–31)
COLOR: YELLOW
COMMENT UA: NORMAL
CREAT SERPL-MCNC: 0.96 MG/DL (ref 0.7–1.2)
DIFFERENTIAL TYPE: ABNORMAL
EKG ATRIAL RATE: 88 BPM
EKG Q-T INTERVAL: 510 MS
EKG QRS DURATION: 170 MS
EKG QTC CALCULATION (BAZETT): 550 MS
EKG R AXIS: -79 DEGREES
EKG T AXIS: 62 DEGREES
EKG VENTRICULAR RATE: 70 BPM
EOSINOPHILS RELATIVE PERCENT: 1 % (ref 1–4)
GFR AFRICAN AMERICAN: >60 ML/MIN
GFR NON-AFRICAN AMERICAN: >60 ML/MIN
GFR SERPL CREATININE-BSD FRML MDRD: ABNORMAL ML/MIN/{1.73_M2}
GFR SERPL CREATININE-BSD FRML MDRD: ABNORMAL ML/MIN/{1.73_M2}
GLUCOSE BLD-MCNC: 123 MG/DL (ref 70–99)
GLUCOSE URINE: NEGATIVE
HCT VFR BLD CALC: 36.3 % (ref 40.7–50.3)
HEMOGLOBIN: 11.5 G/DL (ref 13–17)
IMMATURE GRANULOCYTES: 0 %
KETONES, URINE: NEGATIVE
LACTIC ACID: 2.1 MMOL/L (ref 0.5–2.2)
LEUKOCYTE ESTERASE, URINE: NEGATIVE
LIPASE: 63 U/L (ref 13–60)
LYMPHOCYTES # BLD: 8 % (ref 24–43)
MCH RBC QN AUTO: 31.9 PG (ref 25.2–33.5)
MCHC RBC AUTO-ENTMCNC: 31.7 G/DL (ref 28.4–34.8)
MCV RBC AUTO: 100.8 FL (ref 82.6–102.9)
MONOCYTES # BLD: 6 % (ref 3–12)
NITRITE, URINE: NEGATIVE
NRBC AUTOMATED: 0 PER 100 WBC
PDW BLD-RTO: 13.2 % (ref 11.8–14.4)
PH UA: 5.5 (ref 5–8)
PLATELET # BLD: 201 K/UL (ref 138–453)
PLATELET ESTIMATE: ABNORMAL
PMV BLD AUTO: 8.8 FL (ref 8.1–13.5)
POTASSIUM SERPL-SCNC: 4.2 MMOL/L (ref 3.7–5.3)
PRO-BNP: 5319 PG/ML
PROTEIN UA: NEGATIVE
RBC # BLD: 3.6 M/UL (ref 4.21–5.77)
RBC # BLD: ABNORMAL 10*6/UL
SEG NEUTROPHILS: 85 % (ref 36–65)
SEGMENTED NEUTROPHILS ABSOLUTE COUNT: 8.04 K/UL (ref 1.5–8.1)
SODIUM BLD-SCNC: 136 MMOL/L (ref 135–144)
SPECIFIC GRAVITY UA: 1.01 (ref 1–1.03)
TOTAL PROTEIN: 7.3 G/DL (ref 6.4–8.3)
TROPONIN INTERP: ABNORMAL
TROPONIN INTERP: ABNORMAL
TROPONIN T: ABNORMAL NG/ML
TROPONIN T: ABNORMAL NG/ML
TROPONIN, HIGH SENSITIVITY: 42 NG/L (ref 0–22)
TROPONIN, HIGH SENSITIVITY: 43 NG/L (ref 0–22)
TURBIDITY: CLEAR
URINE HGB: NEGATIVE
UROBILINOGEN, URINE: NORMAL
WBC # BLD: 9.5 K/UL (ref 3.5–11.3)
WBC # BLD: ABNORMAL 10*3/UL

## 2022-01-07 PROCEDURE — 83605 ASSAY OF LACTIC ACID: CPT

## 2022-01-07 PROCEDURE — 83880 ASSAY OF NATRIURETIC PEPTIDE: CPT

## 2022-01-07 PROCEDURE — 85025 COMPLETE CBC W/AUTO DIFF WBC: CPT

## 2022-01-07 PROCEDURE — 96375 TX/PRO/DX INJ NEW DRUG ADDON: CPT

## 2022-01-07 PROCEDURE — 99285 EMERGENCY DEPT VISIT HI MDM: CPT

## 2022-01-07 PROCEDURE — 97162 PT EVAL MOD COMPLEX 30 MIN: CPT

## 2022-01-07 PROCEDURE — 80053 COMPREHEN METABOLIC PANEL: CPT

## 2022-01-07 PROCEDURE — 97530 THERAPEUTIC ACTIVITIES: CPT

## 2022-01-07 PROCEDURE — 96372 THER/PROPH/DIAG INJ SC/IM: CPT

## 2022-01-07 PROCEDURE — 6360000004 HC RX CONTRAST MEDICATION: Performed by: STUDENT IN AN ORGANIZED HEALTH CARE EDUCATION/TRAINING PROGRAM

## 2022-01-07 PROCEDURE — 93005 ELECTROCARDIOGRAM TRACING: CPT | Performed by: STUDENT IN AN ORGANIZED HEALTH CARE EDUCATION/TRAINING PROGRAM

## 2022-01-07 PROCEDURE — 97116 GAIT TRAINING THERAPY: CPT

## 2022-01-07 PROCEDURE — 99222 1ST HOSP IP/OBS MODERATE 55: CPT | Performed by: NURSE PRACTITIONER

## 2022-01-07 PROCEDURE — 74018 RADEX ABDOMEN 1 VIEW: CPT

## 2022-01-07 PROCEDURE — 2500000003 HC RX 250 WO HCPCS: Performed by: SURGERY

## 2022-01-07 PROCEDURE — 96376 TX/PRO/DX INJ SAME DRUG ADON: CPT

## 2022-01-07 PROCEDURE — 97166 OT EVAL MOD COMPLEX 45 MIN: CPT

## 2022-01-07 PROCEDURE — 81003 URINALYSIS AUTO W/O SCOPE: CPT

## 2022-01-07 PROCEDURE — 93010 ELECTROCARDIOGRAM REPORT: CPT | Performed by: INTERNAL MEDICINE

## 2022-01-07 PROCEDURE — 74177 CT ABD & PELVIS W/CONTRAST: CPT

## 2022-01-07 PROCEDURE — 2580000003 HC RX 258: Performed by: NURSE PRACTITIONER

## 2022-01-07 PROCEDURE — 6360000002 HC RX W HCPCS: Performed by: STUDENT IN AN ORGANIZED HEALTH CARE EDUCATION/TRAINING PROGRAM

## 2022-01-07 PROCEDURE — 97535 SELF CARE MNGMENT TRAINING: CPT

## 2022-01-07 PROCEDURE — 1200000000 HC SEMI PRIVATE

## 2022-01-07 PROCEDURE — 6370000000 HC RX 637 (ALT 250 FOR IP): Performed by: NURSE PRACTITIONER

## 2022-01-07 PROCEDURE — 6360000002 HC RX W HCPCS: Performed by: NURSE PRACTITIONER

## 2022-01-07 PROCEDURE — 6360000002 HC RX W HCPCS: Performed by: SURGERY

## 2022-01-07 PROCEDURE — 2500000003 HC RX 250 WO HCPCS: Performed by: NURSE PRACTITIONER

## 2022-01-07 PROCEDURE — APPSS45 APP SPLIT SHARED TIME 31-45 MINUTES: Performed by: NURSE PRACTITIONER

## 2022-01-07 PROCEDURE — 71045 X-RAY EXAM CHEST 1 VIEW: CPT

## 2022-01-07 PROCEDURE — 2580000003 HC RX 258: Performed by: SURGERY

## 2022-01-07 PROCEDURE — 2580000003 HC RX 258: Performed by: STUDENT IN AN ORGANIZED HEALTH CARE EDUCATION/TRAINING PROGRAM

## 2022-01-07 PROCEDURE — 96374 THER/PROPH/DIAG INJ IV PUSH: CPT

## 2022-01-07 PROCEDURE — 83690 ASSAY OF LIPASE: CPT

## 2022-01-07 PROCEDURE — 84484 ASSAY OF TROPONIN QUANT: CPT

## 2022-01-07 RX ORDER — BUDESONIDE AND FORMOTEROL FUMARATE DIHYDRATE 160; 4.5 UG/1; UG/1
2 AEROSOL RESPIRATORY (INHALATION) 2 TIMES DAILY
Status: DISCONTINUED | OUTPATIENT
Start: 2022-01-07 | End: 2022-01-10 | Stop reason: HOSPADM

## 2022-01-07 RX ORDER — BENZONATATE 100 MG/1
100 CAPSULE ORAL 3 TIMES DAILY PRN
Status: DISCONTINUED | OUTPATIENT
Start: 2022-01-07 | End: 2022-01-10 | Stop reason: HOSPADM

## 2022-01-07 RX ORDER — SODIUM CHLORIDE 0.9 % (FLUSH) 0.9 %
10 SYRINGE (ML) INJECTION PRN
Status: DISCONTINUED | OUTPATIENT
Start: 2022-01-07 | End: 2022-01-10 | Stop reason: HOSPADM

## 2022-01-07 RX ORDER — ROPINIROLE 0.25 MG/1
0.25 TABLET, FILM COATED ORAL 2 TIMES DAILY
Status: DISCONTINUED | OUTPATIENT
Start: 2022-01-07 | End: 2022-01-10 | Stop reason: HOSPADM

## 2022-01-07 RX ORDER — PANTOPRAZOLE SODIUM 40 MG/1
40 TABLET, DELAYED RELEASE ORAL DAILY
Status: DISCONTINUED | OUTPATIENT
Start: 2022-01-07 | End: 2022-01-08

## 2022-01-07 RX ORDER — MAGNESIUM SULFATE 1 G/100ML
1000 INJECTION INTRAVENOUS PRN
Status: DISCONTINUED | OUTPATIENT
Start: 2022-01-07 | End: 2022-01-10 | Stop reason: HOSPADM

## 2022-01-07 RX ORDER — METOPROLOL SUCCINATE 50 MG/1
50 TABLET, EXTENDED RELEASE ORAL DAILY
Status: DISCONTINUED | OUTPATIENT
Start: 2022-01-07 | End: 2022-01-10 | Stop reason: HOSPADM

## 2022-01-07 RX ORDER — TIZANIDINE 4 MG/1
4 TABLET ORAL EVERY 8 HOURS PRN
Status: DISCONTINUED | OUTPATIENT
Start: 2022-01-07 | End: 2022-01-10 | Stop reason: HOSPADM

## 2022-01-07 RX ORDER — POTASSIUM CHLORIDE 7.45 MG/ML
10 INJECTION INTRAVENOUS PRN
Status: DISCONTINUED | OUTPATIENT
Start: 2022-01-07 | End: 2022-01-10 | Stop reason: HOSPADM

## 2022-01-07 RX ORDER — 0.9 % SODIUM CHLORIDE 0.9 %
80 INTRAVENOUS SOLUTION INTRAVENOUS ONCE
Status: COMPLETED | OUTPATIENT
Start: 2022-01-07 | End: 2022-01-07

## 2022-01-07 RX ORDER — PROMETHAZINE HYDROCHLORIDE 25 MG/ML
12.5 INJECTION, SOLUTION INTRAMUSCULAR; INTRAVENOUS ONCE
Status: COMPLETED | OUTPATIENT
Start: 2022-01-07 | End: 2022-01-07

## 2022-01-07 RX ORDER — TAMSULOSIN HYDROCHLORIDE 0.4 MG/1
0.4 CAPSULE ORAL DAILY
Status: DISCONTINUED | OUTPATIENT
Start: 2022-01-07 | End: 2022-01-10 | Stop reason: HOSPADM

## 2022-01-07 RX ORDER — FLUTICASONE PROPIONATE 50 MCG
2 SPRAY, SUSPENSION (ML) NASAL DAILY
Status: DISCONTINUED | OUTPATIENT
Start: 2022-01-07 | End: 2022-01-10 | Stop reason: HOSPADM

## 2022-01-07 RX ORDER — ONDANSETRON 2 MG/ML
4 INJECTION INTRAMUSCULAR; INTRAVENOUS EVERY 6 HOURS PRN
Status: DISCONTINUED | OUTPATIENT
Start: 2022-01-07 | End: 2022-01-10 | Stop reason: HOSPADM

## 2022-01-07 RX ORDER — MORPHINE SULFATE 2 MG/ML
2 INJECTION, SOLUTION INTRAMUSCULAR; INTRAVENOUS
Status: DISCONTINUED | OUTPATIENT
Start: 2022-01-07 | End: 2022-01-10 | Stop reason: HOSPADM

## 2022-01-07 RX ORDER — DEXTROSE, SODIUM CHLORIDE, SODIUM LACTATE, POTASSIUM CHLORIDE, AND CALCIUM CHLORIDE 5; .6; .31; .03; .02 G/100ML; G/100ML; G/100ML; G/100ML; G/100ML
INJECTION, SOLUTION INTRAVENOUS CONTINUOUS
Status: DISCONTINUED | OUTPATIENT
Start: 2022-01-07 | End: 2022-01-10 | Stop reason: HOSPADM

## 2022-01-07 RX ORDER — ALBUTEROL SULFATE 90 UG/1
2 AEROSOL, METERED RESPIRATORY (INHALATION) EVERY 6 HOURS PRN
Status: DISCONTINUED | OUTPATIENT
Start: 2022-01-07 | End: 2022-01-10 | Stop reason: HOSPADM

## 2022-01-07 RX ORDER — MORPHINE SULFATE 2 MG/ML
2 INJECTION, SOLUTION INTRAMUSCULAR; INTRAVENOUS ONCE
Status: COMPLETED | OUTPATIENT
Start: 2022-01-07 | End: 2022-01-07

## 2022-01-07 RX ORDER — IPRATROPIUM BROMIDE AND ALBUTEROL SULFATE 2.5; .5 MG/3ML; MG/3ML
1 SOLUTION RESPIRATORY (INHALATION) EVERY 4 HOURS PRN
Status: DISCONTINUED | OUTPATIENT
Start: 2022-01-07 | End: 2022-01-07

## 2022-01-07 RX ORDER — ONDANSETRON 2 MG/ML
4 INJECTION INTRAMUSCULAR; INTRAVENOUS ONCE
Status: COMPLETED | OUTPATIENT
Start: 2022-01-07 | End: 2022-01-07

## 2022-01-07 RX ORDER — BUMETANIDE 0.25 MG/ML
1 INJECTION, SOLUTION INTRAMUSCULAR; INTRAVENOUS 2 TIMES DAILY
Status: DISCONTINUED | OUTPATIENT
Start: 2022-01-07 | End: 2022-01-10 | Stop reason: HOSPADM

## 2022-01-07 RX ORDER — ONDANSETRON 2 MG/ML
4 INJECTION INTRAMUSCULAR; INTRAVENOUS EVERY 6 HOURS PRN
Status: DISCONTINUED | OUTPATIENT
Start: 2022-01-07 | End: 2022-01-07 | Stop reason: SDUPTHER

## 2022-01-07 RX ORDER — POTASSIUM CHLORIDE 20 MEQ/1
40 TABLET, EXTENDED RELEASE ORAL PRN
Status: DISCONTINUED | OUTPATIENT
Start: 2022-01-07 | End: 2022-01-10 | Stop reason: HOSPADM

## 2022-01-07 RX ORDER — POLYETHYLENE GLYCOL 3350 17 G/17G
17 POWDER, FOR SOLUTION ORAL DAILY PRN
Status: DISCONTINUED | OUTPATIENT
Start: 2022-01-07 | End: 2022-01-10 | Stop reason: HOSPADM

## 2022-01-07 RX ORDER — SODIUM CHLORIDE 0.9 % (FLUSH) 0.9 %
5-40 SYRINGE (ML) INJECTION EVERY 12 HOURS SCHEDULED
Status: DISCONTINUED | OUTPATIENT
Start: 2022-01-07 | End: 2022-01-10 | Stop reason: HOSPADM

## 2022-01-07 RX ORDER — SODIUM CHLORIDE 9 MG/ML
25 INJECTION, SOLUTION INTRAVENOUS PRN
Status: DISCONTINUED | OUTPATIENT
Start: 2022-01-07 | End: 2022-01-10 | Stop reason: HOSPADM

## 2022-01-07 RX ORDER — ONDANSETRON 4 MG/1
4 TABLET, ORALLY DISINTEGRATING ORAL EVERY 8 HOURS PRN
Status: DISCONTINUED | OUTPATIENT
Start: 2022-01-07 | End: 2022-01-10 | Stop reason: HOSPADM

## 2022-01-07 RX ADMIN — BUMETANIDE 1 MG: 0.25 INJECTION INTRAMUSCULAR; INTRAVENOUS at 20:32

## 2022-01-07 RX ADMIN — FAMOTIDINE 20 MG: 10 INJECTION, SOLUTION INTRAVENOUS at 12:54

## 2022-01-07 RX ADMIN — IOPAMIDOL 75 ML: 755 INJECTION, SOLUTION INTRAVENOUS at 06:00

## 2022-01-07 RX ADMIN — SODIUM CHLORIDE, PRESERVATIVE FREE 10 ML: 5 INJECTION INTRAVENOUS at 06:00

## 2022-01-07 RX ADMIN — SODIUM CHLORIDE, SODIUM LACTATE, POTASSIUM CHLORIDE, CALCIUM CHLORIDE AND DEXTROSE MONOHYDRATE: 5; 600; 310; 30; 20 INJECTION, SOLUTION INTRAVENOUS at 12:53

## 2022-01-07 RX ADMIN — MORPHINE SULFATE 2 MG: 2 INJECTION, SOLUTION INTRAMUSCULAR; INTRAVENOUS at 20:31

## 2022-01-07 RX ADMIN — ONDANSETRON 4 MG: 2 INJECTION INTRAMUSCULAR; INTRAVENOUS at 05:09

## 2022-01-07 RX ADMIN — ONDANSETRON 4 MG: 2 INJECTION INTRAMUSCULAR; INTRAVENOUS at 05:54

## 2022-01-07 RX ADMIN — BUDESONIDE AND FORMOTEROL FUMARATE DIHYDRATE 2 PUFF: 160; 4.5 AEROSOL RESPIRATORY (INHALATION) at 20:32

## 2022-01-07 RX ADMIN — BUMETANIDE 1 MG: 0.25 INJECTION INTRAMUSCULAR; INTRAVENOUS at 12:54

## 2022-01-07 RX ADMIN — SODIUM CHLORIDE, PRESERVATIVE FREE 30 ML: 5 INJECTION INTRAVENOUS at 12:54

## 2022-01-07 RX ADMIN — FAMOTIDINE 20 MG: 10 INJECTION, SOLUTION INTRAVENOUS at 20:32

## 2022-01-07 RX ADMIN — TIZANIDINE 4 MG: 4 TABLET ORAL at 20:32

## 2022-01-07 RX ADMIN — SODIUM CHLORIDE, SODIUM LACTATE, POTASSIUM CHLORIDE, CALCIUM CHLORIDE AND DEXTROSE MONOHYDRATE: 5; 600; 310; 30; 20 INJECTION, SOLUTION INTRAVENOUS at 20:31

## 2022-01-07 RX ADMIN — MORPHINE SULFATE 2 MG: 2 INJECTION, SOLUTION INTRAMUSCULAR; INTRAVENOUS at 05:09

## 2022-01-07 RX ADMIN — SODIUM CHLORIDE 80 ML: 9 INJECTION, SOLUTION INTRAVENOUS at 06:00

## 2022-01-07 RX ADMIN — MORPHINE SULFATE 2 MG: 2 INJECTION, SOLUTION INTRAMUSCULAR; INTRAVENOUS at 23:16

## 2022-01-07 RX ADMIN — ONDANSETRON 4 MG: 2 INJECTION INTRAMUSCULAR; INTRAVENOUS at 20:31

## 2022-01-07 RX ADMIN — PROMETHAZINE HYDROCHLORIDE 12.5 MG: 25 INJECTION INTRAMUSCULAR; INTRAVENOUS at 07:56

## 2022-01-07 ASSESSMENT — PAIN DESCRIPTION - LOCATION
LOCATION: ABDOMEN

## 2022-01-07 ASSESSMENT — ENCOUNTER SYMPTOMS
WHEEZING: 0
NAUSEA: 1
RECTAL PAIN: 1
SINUS PAIN: 0
EYE DISCHARGE: 0
ABDOMINAL DISTENTION: 1
EYE REDNESS: 0
PHOTOPHOBIA: 0
ABDOMINAL PAIN: 1
SINUS PRESSURE: 0
COLOR CHANGE: 0
SHORTNESS OF BREATH: 0
VOMITING: 1

## 2022-01-07 ASSESSMENT — PAIN SCALES - GENERAL
PAINLEVEL_OUTOF10: 7
PAINLEVEL_OUTOF10: 6

## 2022-01-07 ASSESSMENT — PAIN DESCRIPTION - ORIENTATION
ORIENTATION: UPPER
ORIENTATION: UPPER
ORIENTATION: RIGHT;LOWER

## 2022-01-07 ASSESSMENT — PAIN DESCRIPTION - PAIN TYPE
TYPE: ACUTE PAIN

## 2022-01-07 NOTE — ED NOTES
Pt brought to ED by wife for c/o RLQ pain and N/V x2 days. Pt rates pain in abdomen 6/10. Pt stated he has been having diarrhea as well. Pt daughter called nurse line and was advised to have pt come to ED. Pt was given zofran and pepcid PTA. Pt vomited in car on way to ED.            Angelina Salgado RN  01/07/22 [id number]

## 2022-01-07 NOTE — ED PROVIDER NOTES
[facility] ED  Emergency Department Encounter     Pt Name: Morenita Reynolds  MRN: [id number]  [place] 1945  Date of evaluation: 1/7/22  PCP:  JULIAN Garcia [id number]       Chief Complaint   Patient presents with    Abdominal Pain     RLQ    Emesis     x2 days       HISTORY OFPRESENT ILLNESS  (Location/Symptom, Timing/Onset, Context/Setting, Quality, Duration, Modifying Brena Going.)      Morenita Reynolds is a 77 y.o. male who presents with right lower quadrant abdominal pain and nausea and vomiting for the past 2 days. States he did have a bowel movement yesterday but has not had a bowel movement or passed gas since this occurred. Does have a remote history of a gastric bypass approximately 20 years ago and reportedly had been following up with Dr. Danna Resendiz approximately 6 months ago. Has been receiving EGDs from our local GI group. Patient was reportedly referred to [facility] clinic for unknown reason for continued management of his gastric bypass. Has endorse generalized abdominal pain and bloating as well. Pain is generalized abdomen. Worse with palpation. Worse in the right lower quadrant.     PAST MEDICAL / SURGICAL / SOCIAL / FAMILY HISTORY      has a past medical history of Acute superficial gastritis without hemorrhage, Anastomotic stricture of stomach, Asthma, Atrial fibrillation (HCC), Calculus of gallbladder without cholecystitis without obstruction, Chronic diastolic heart failure (HCC), Chronic rhinosinusitis, Class 2 severe obesity due to excess calories with serious comorbidity and body mass index (BMI) of 36.0 to 36.9 in [facility]), COPD (chronic obstructive pulmonary disease) ([facility] [address] E. coli septicemia ([facility] [address] E. coli UTI, Foot drop, right, Fracture of femur ([facility] [address] Gait disorder, Gastric out let obstruction, GERD (gastroesophageal reflux disease), Hallux valgus, acquired, bilateral, Hyperlipidemia, Hypertension, Impaired hearing, Internal hemorrhoids, Lymphedema of both lower extremities, Nausea & vomiting, OA (osteoarthritis) of knee, bilateral, Obesity, MARIAMA on CPAP, Osteoarthritis, Osteoarthritis of lumbar spine, S/P revision of total knee, Septicemia due to E. coli (HCC), Sick sinus syndrome (HCC), Simple chronic bronchitis (HCC), Slow transit constipation, Unspecified sleep apnea, and UTI (urinary tract infection). has a past surgical history that includes Finger amputation (); Gastric bypass surgery (); Carpal tunnel release; Colonoscopy; Rotator cuff repair; joint replacement ( & ); Hemorrhoid surgery; pr egd transoral biopsy single/multiple (N/A, 2018); Upper gastrointestinal endoscopy (2018); Upper gastrointestinal endoscopy (N/A, 2018); Upper gastrointestinal endoscopy (N/A, 2018); Upper gastrointestinal endoscopy (2018); pr egd flexible foreign body removal (N/A, 2018); Upper gastrointestinal endoscopy (2018); Upper gastrointestinal endoscopy (N/A, 10/01/2018); Upper gastrointestinal endoscopy (10/01/2018); Upper gastrointestinal endoscopy (N/A, 2018); Cystoscopy (2019); Cystoscopy (N/A, 2019); Upper gastrointestinal endoscopy (N/A, 10/15/2020); Colonoscopy (N/A, 2021); Pacemaker insertion (2021); and Upper gastrointestinal endoscopy (N/A, 2021).     Social History     Socioeconomic History    Marital status:      Spouse name: Not on file    Number of children: Not on file    Years of education: Not on file    Highest education level: Not on file   Occupational History    Not on file   Tobacco Use    Smoking status: Former Smoker     Packs/day: 1.00     Years: 20.00     Pack years: 20.00     Quit date: 1976     Years since quittin.0    Smokeless tobacco: Never Used   Vaping Use    Vaping Use: Never used   Substance and Sexual Activity    Alcohol use: No    Drug use: No    Sexual activity: Not on file   Other Topics Concern    Not on file   Social History Narrative    Not on file     Social Determinants of Health     Financial Resource Strain: Low Risk     Difficulty of Paying Living Expenses: Not hard at all   Food Insecurity: No Food Insecurity    Worried About Running Out of Food in the Last Year: Never true    [address] in the Last Year: Never true   Transportation Needs:     Lack of Transportation (Medical): Not on file    Lack of Transportation (Non-Medical): Not on file   Physical Activity:     Days of Exercise per Week: Not on file    Minutes of Exercise per Session: Not on file   Stress:     Feeling of Stress : Not on file   Social Connections:     Frequency of Communication with Friends and Family: Not on file    Frequency of Social Gatherings with Friends and Family: Not on file    Attends Jew Services: Not on file    Active Member of [address] [place] [address] School Yourself or Organizations: Not on file    Attends Club or Organization Meetings: Not on file    Marital Status: Not on file   Intimate Partner Violence:     Fear of Current or Ex-Partner: Not on file    Emotionally Abused: Not on file    Physically Abused: Not on file    Sexually Abused: Not on file   Housing Stability:     Unable to Pay for Housing in the Last Year: Not on file    Number of [place] in the Last Year: Not on file    Unstable Housing in the Last Year: Not on file       Family History   Problem Relation Age of Onset    Cancer Mother     Heart Attack Father        Allergies:  Darvocet [propoxyphene n-acetaminophen], Other, Oxycodone-acetaminophen, and Propoxyphene    Home Medications:  Prior to Admission medications    Medication Sig Start Date End Date Taking?  Authorizing Provider   fluticasone (FLONASE) 50 MCG/ACT nasal spray 2 sprays by Nasal route daily 12/29/21   JULIAN Ulrich CNP   Multiple Vitamin (MULTI-VITAMINS) TABS TAKE 1 TABLET BY MOUTH DAILY 12/10/21   JULIAN Ulrich - CNP   potassium chloride (KLOR-CON M) 20 MEQ extended release tablet TAKE 1 TABLET BY MOUTH TWICE DAILY 12/10/21   JULIAN Mckenna CNP   docusate sodium (COLACE) 100 MG capsule TAKE 1 CAPSULE BY MOUTH TWICE DAILY AS NEEDED FOR CONSTIPATION 12/10/21   JULIAN Mkcenna CNP   tiZANidine (ZANAFLEX) 4 MG tablet TAKE 1 TABLET BY MOUTH DAILY AS NEEDED 12/10/21   JULIAN Mckenna CNP   rOPINIRole (REQUIP) 0.25 MG tablet TAKE 1 TABLET BY MOUTH TWICE DAILY 11/15/21   JULIAN Mckenna CNP   cetirizine (ZYRTEC) 10 MG tablet TAKE 1 TABLET BY MOUTH DAILY 11/15/21   JULIAN Mckenna CNP   bumetanide (BUMEX) 1 MG tablet TAKE 2 TABLETS BY MOUTH EVERY MORNING AND TAKE 1 TABLET BY MOUTH IN THE AFTERNOON AND 1 TABLET EVERY EVENING IF WEIGHT IS >3 ABOVE BASELINE 11/15/21   JULIAN Mckenna CNP   pantoprazole (PROTONIX) 40 MG tablet TAKE 1 TABLET BY MOUTH DAILY 11/15/21   JULIAN Mckenna CNP   gabapentin (NEURONTIN) 300 MG capsule Take 1 capsule by mouth 3 times daily for 28 days.  11/10/21 12/8/21  JULIAN Mckenna CNP   tamsulosin [facility]) 0.4 MG capsule Take 1 capsule by mouth daily 10/19/21   JULIAN Mckenna CNP   CREON 60853-46153 units delayed release capsule TAKE 1 CAPSULE BY MOUTH THREE TIMES DAILY WITH MEALS 9/20/21   JULIAN Mckenna CNP   metoprolol succinate (TOPROL XL) 50 MG extended release tablet TAKE 1 TABLET BY MOUTH AT BEDTIME 9/20/21   JULIAN Mckenna CNP   rivaroxaban (XARELTO) 20 MG TABS tablet TAKE 1 TABLET BY MOUTH DAILY 8/27/21   JULIAN Mckenna CNP   Fluticasone furoate-vilanterol (BREO ELLIPTA) 200-25 MCG/INH AEPB inhaler INHALE 1 PUFF INTO THE LUNGS DAILY **RINSE MOUTH AFTER EACH USE**    Historical Provider, MD   Nutritional Supplements (BOOST) LIQD 1 can twice a day for meals 7/28/21   JULIAN Mckenna CNP   loratadine (CLARITIN) 10 MG tablet Take 1 tablet by mouth daily 4/27/21   JULIAN Mckenna CNP   albuterol sulfate HFA (VENTOLIN HFA) 108 (90 Base) MCG/ACT inhaler Inhale 2 puffs into the lungs every 6 hours as needed for Wheezing or Shortness of Breath    Historical Provider, MD   benzonatate (TESSALON) 100 MG capsule TAKE 1 CAPSULE BY MOUTH THREE TIMES DAILY AS NEEDED FOR COUGH 3/31/21   Chan Jha PA-C   vitamin B-12 (CYANOCOBALAMIN) 100 MCG tablet TAKE 1 TABLET BY MOUTH DAILY AS NEEDED FOR FATIGUE 3/27/21   Chan Jha PA-C   carboxymethylcellulose (REFRESH PLUS) 0.5 % SOLN ophthalmic solution Apply 1 drop to eye 4 times daily    Historical Provider, MD   Menthol-Methyl Salicylate ([facility]) 0.5-15 % CREA Apply topically 2 times daily as needed 6/26/20   Historical Provider, MD   ascorbic acid (VITAMIN C) 500 MG tablet Take 1 tablet by mouth daily 10/26/20   Chan Jha PA-C   ferrous sulfate (FE TABS 325) 325 (65 Fe) MG EC tablet Take 1 tablet by mouth 2 times daily (with meals) 10/16/20   Kerrieliana Pisano PA-C   SM LUBRICANT EYE DROPS 0.4-0.3 % ophthalmic solution PLACE 1 DROP INTO BOTH EYES FOUR TIMES DAILY AS NEEDED FOR DRY EYES 10/17/19   Chan Jha PA-C   Handicap Placard MISC by Does not apply route 6/27/19   Chan Jha PA-C   Incontinence Supply Disposable (INCONTINENCE BRIEF LARGE) MISC To use as directed. Use 3x a day 4/30/19   Chan Jha PA-C   Foot Care Products (TRI-BALANCE ORTHOTICS MENS) MISC Provide insurance covered orthotics shoes, wear daily 12/12/18   Chan Jha PA-C   ipratropium-albuterol (DUONEB) 0.5-2.5 (3) MG/3ML SOLN nebulizer solution Inhale 1 vial into the lungs every 4 hours as needed     Historical Provider, MD   Armodafinil 200 MG TABS Take 1 tablet by mouth 2 times daily. 4/2/18   Historical Provider, MD       REVIEW OF SYSTEMS    (2-9 systems for level 4, 10 or more for level 5)      Review of Systems   Constitutional: Negative for chills and fever. Eyes: Negative for discharge and redness. Respiratory: Negative for shortness of breath. Cardiovascular: Negative for chest pain. Gastrointestinal: Positive for abdominal pain, nausea and vomiting. Genitourinary: Negative for dysuria. Musculoskeletal: Negative for arthralgias. Skin: Negative for color change and rash. Allergic/Immunologic: Positive for environmental allergies. Neurological: Negative for headaches. Psychiatric/Behavioral: Negative for agitation and confusion. PHYSICAL EXAM   (up to 7 for level 4, 8 or more for level 5)     INITIAL VITALS:    height is 5' 10\" (1.778 m) and weight is 191 lb (86.6 kg). His oral temperature is 98.2 °F (36.8 °C). His blood pressure is 98/68 and his pulse is 75. His respiration is 18 and oxygen saturation is 97%. Physical Exam  Vitals and nursing note reviewed. Constitutional:       Appearance: He is well-developed. HENT:      Head: Normocephalic and atraumatic. Nose: Nose normal.      Mouth/Throat:      Mouth: Mucous membranes are dry. Eyes:      General: No scleral icterus. Conjunctiva/sclera: Conjunctivae normal.      Pupils: Pupils are equal, round, and reactive to light. Neck:      Trachea: No tracheal deviation. Cardiovascular:      Rate and Rhythm: Normal rate and regular rhythm. Heart sounds: Normal heart sounds. No murmur heard. No friction rub. No gallop. Pulmonary:      Effort: Pulmonary effort is normal. No respiratory distress. Breath sounds: Normal breath sounds. No wheezing or rales. Abdominal:      General: There is distension. Palpations: Abdomen is soft. Tenderness: There is abdominal tenderness. Comments: Generalized distention and generalized tenderness worse in the right lower quadrant, no rebound, no palpable hernia, no palpable mass   Musculoskeletal:         General: Normal range of motion. Cervical back: Neck supple. Skin:     General: Skin is warm and dry. Findings: No erythema or rash.    Neurological:      Mental Status: He is alert and oriented to person, place, and time. Psychiatric:         Behavior: Behavior normal.         DIFFERENTIAL  DIAGNOSIS     PLAN (LABS / IMAGING / EKG):  Orders Placed This Encounter   Procedures    CT ABDOMEN PELVIS W IV CONTRAST Additional Contrast? None    XR CHEST PORTABLE    CBC Auto Differential    Comprehensive Metabolic Panel    Lipase    Lactic Acid    Troponin    Brain Natriuretic Peptide    Urinalysis Reflex to Culture    Troponin    Tube insertion    Inpatient consult to Hospitalist    Inpatient consult to General Surgery    EKG 12 Lead    PATIENT STATUS (FROM ED OR OR/PROCEDURAL) Inpatient       MEDICATIONS ORDERED:  Orders Placed This Encounter   Medications    morphine (PF) injection 2 mg    ondansetron (ZOFRAN) injection 4 mg    ondansetron (ZOFRAN) injection 4 mg    0.9 % sodium chloride bolus    iopamidol (ISOVUE-370) 76 % injection 75 mL    sodium chloride flush 0.9 % injection 10 mL    promethazine (PHENERGAN) injection 12.5 mg       DDX: Appendicitis versus small bowel obstruction versus atypical ACS    Initial MDM/Plan: 77 y.o. male who presents with right lower quadrant and generalized abdominal pain and bloating. Patient has a history of CHF as well as cardiac history. Will get troponin as well as EKG and chest x-ray in addition to abdominal imaging and abdominal work-up with lactic acid. Symptomatic treatment. Pain medication.     DIAGNOSTIC RESULTS / EMERGENCY DEPARTMENT COURSE / MDM     LABS:  Labs Reviewed   CBC WITH AUTO DIFFERENTIAL - Abnormal; Notable for the following components:       Result Value    RBC 3.60 (*)     Hemoglobin 11.5 (*)     Hematocrit 36.3 (*)     Seg Neutrophils 85 (*)     Lymphocytes 8 (*)     Absolute Lymph # 0.75 (*)     All other components within normal limits   COMPREHENSIVE METABOLIC PANEL - Abnormal; Notable for the following components:    Glucose 123 (*)     Bun/Cre Ratio 21 (*)     Chloride 96 (*)     All other components within normal limits   LIPASE - Abnormal; Notable for the following components:    Lipase 63 (*)     All other components within normal limits   TROPONIN - Abnormal; Notable for the following components:    Troponin, High Sensitivity 43 (*)     All other components within normal limits   BRAIN NATRIURETIC PEPTIDE - Abnormal; Notable for the following components:    Pro-BNP 5,319 (*)     All other components within normal limits   TROPONIN - Abnormal; Notable for the following components:    Troponin, High Sensitivity 42 (*)     All other components within normal limits   LACTIC ACID   URINE RT REFLEX TO CULTURE         RADIOLOGY:  CT ABDOMEN PELVIS W IV CONTRAST Additional Contrast? None    Result Date: 1/7/2022  EXAMINATION: CT OF THE ABDOMEN AND PELVIS WITH CONTRAST 1/7/2022 5:55 am TECHNIQUE: CT of the abdomen and pelvis was performed with the administration of intravenous contrast. Multiplanar reformatted images are provided for review. Dose modulation, iterative reconstruction, and/or weight based adjustment of the mA/kV was utilized to reduce the radiation dose to as low as reasonably achievable. COMPARISON: 2018 HISTORY: ORDERING SYSTEM PROVIDED HISTORY: Nausea, vomiting, right lower quadrant abdominal pain, small bowel obstruction versus appendicitis TECHNOLOGIST PROVIDED HISTORY: Nausea, vomiting, right lower quadrant abdominal pain, small bowel obstruction versus appendicitis Decision Support Exception - unselect if not a suspected or confirmed emergency medical condition->Emergency Medical Condition (MA) Reason for Exam: Nausea, vomiting, right lower quadrant abdominal pain, small bowel obstruction versus appendicitis FINDINGS: Lower Chest: Severe coronary artery calcification is seen. Trace pericardial fluid is seen. Streak artifact is seen from pacer. Aortic valve calcification is seen. Small hiatal hernia seen. Postsurgical changes seen at the GE junction. Organs: No splenomegaly.   Splenic vascular calcifications are seen Adrenal glands appear normal No hydronephrosis on the left. No hydronephrosis on the right. Circumscribed hypodense nodules are seen in the right and left kidney, similar to prior, likely cyst. A mildly hyperdense nodule projects posteriorly off the right kidney measuring 1.3 cm, likely cyst Gallbladder is distended. Gallstones are seen. There is trace intrahepatic biliary ductal dilatation No peripancreatic fluid GI/Bowel: Mild stool load is seen in the colon. Scattered colonic diverticula are seen Postsurgical changes seen in the stomach from gastric bypass. .  Stomach is distended. Scattered dilated loops of small bowel are seen. Distal small bowel loops are collapsed. There is likely transition point seen in the right paramidline region in the abdomen. Appendix is not clearly seen. Pelvis: Trace free fluid is seen in the pelvis. Calcifications seen in the pelvis, compatible with phleboliths. Peritoneum/Retroperitoneum: Atherosclerotic change seen in abdominal aorta. Atherosclerotic changes seen in the iliacs. Bones/Soft Tissues: Spurring is seen in the spine. Spurring is seen in the hip joints. Findings of small-bowel obstruction with suspected transition point in the lower anterior abdomen on the right. There is evidence of prior gastric bypass surgery. Small amount of reactive free fluid is seen in the pelvis Cholelithiasis.   No cholecystitis Hyperdense nodule projecting posteriorly off the right kidney not clearly a simple cyst.  When the patient's acute symptoms have resolved, recommend follow-up abdominal CT or MRI with and without IV contrast, renal protocol to evaluate for any solid internal enhancement RECOMMENDATIONS: Unavailable     XR CHEST PORTABLE    Result Date: 1/7/2022  EXAMINATION: ONE XRAY VIEW OF THE CHEST 1/7/2022 5:15 am COMPARISON: 11/10/2021 HISTORY: ORDERING SYSTEM PROVIDED HISTORY: Nausea, vomiting, abd pain TECHNOLOGIST PROVIDED HISTORY: Nausea, vomiting, abd pain Reason for Exam: nv abd pain FINDINGS: Overlying left chest pacemaker. Prominent appearance of the cardiac silhouette. Low lung volumes. Asymmetric elevation of the right hemidiaphragm. No focal consolidation or overt pulmonary edema. Costophrenic angles are sharp. No evidence of pneumothorax. No acute osseous abnormalities. Left humerus suture anchors. 1. Similar prominent appearance of the cardiac silhouette allowing for differences in technique. 2. No focal consolidation or overt pulmonary edema. EKG  Rhythm: normal sinus   Rate: normal  Axis: normal  Conduction: normal  ST Segments: no acute change  T Waves: no acute change  Q Waves: no acute change    Clinical Impression: no acute change, but this is a nonspecific EKG. All EKG's are interpreted by the Emergency Department Physician who either signs or Co-signs this chart in the absence of a cardiologist.    EMERGENCY DEPARTMENT COURSE:     CT imaging showing small bowel obstruction with suspected transition point in the lower abdomen on the right overlying where patient pain is. Did discuss with Dr. Junior Heath of general surgery as patient has remote history of vertical banded gastroplasty approximately 20 years ago which a uncommon procedure to confirm that they would be okay seeing and evaluating patient and they were agreeable. Updated general surgery resident as well. NG placed by nursing staff continued symptomatic control. Lactic acid normal.  Discussed with hospitalist service for admission. PROCEDURES:  None    CONSULTS:  IP CONSULT TO HOSPITALIST  IP CONSULT TO GENERAL SURGERY    CRITICAL CARE:  0    FINAL IMPRESSION      1. SBO (small bowel obstruction) ([facility] [address]          DISPOSITION / PLAN     DISPOSITION Admitted 01/07/2022 08:23:10 AM        PATIENTREFERRED TO:  No follow-up provider specified.     DISCHARGE MEDICATIONS:  New Prescriptions    No medications on file       Beverly Baker DO  EmergencyMedicine Attending    (Please note that portions of this note were completed with a voice recognition program.  Efforts were made to edit the dictations but occasionally words are mis-transcribed.)       Nik Cordry [place],   01/07/22 [id number]

## 2022-01-07 NOTE — CONSULTS
[facility]      Patient's Name/ Date of Birth/ Gender: Enid Moore / [birth year] (77 y.o.) / male     MRN/ACCOUNT #: [de-identified]    Referring Physician: Olive Rodriguez    Consulting Physician: Dr. Jessi Harrell    History of present Illness: Enid Moore is a 77 y.o. male, who presented to the [facility] emergency department where he is seen and evaluated with complaints of abdominal pain. Patient claims that he has had intermittent diarrhea and nausea/vomiting over the past 2 days. He really claims that the pain started yesterday. He admits to feeling distended. His pain is diffuse over his abdomen. He denies any fevers or chills. Denies any difficulty breathing or chest pain. He admits to passing flatus yesterday as well as having a loose bowel movement yesterday. Denies any blood in his stool. Patient does have a past surgical history significant for a vertical banded gastroplasty done 20 years ago. He does also have a history of 4 to 5 months of nausea and emesis. He has seen Dr. Melly Roberto in the past who did an EGD with dilation. Dr. Felicitas Christopher had recommended previously he follow-up at [facility] clinic. The patient claims he is in attempts to follow-up at the [facility] clinic. CT scan was done with a concern for small bowel obstruction. White blood cell count within normal limits. Lactic acid 2.1. Electrolytes within normal limits. Troponin 43.     Past Medical History:   Past Medical History:   Diagnosis Date    Acute superficial gastritis without hemorrhage 05/26/2018    Anastomotic stricture of stomach     Asthma     Atrial fibrillation ([facility]     On Xarelto 6/27/2020    Calculus of gallbladder without cholecystitis without obstruction 05/02/2017    Chronic diastolic heart failure ([address] 11/17/2017    Chronic rhinosinusitis 04/13/2015    Class 2 severe obesity due to excess calories with serious comorbidity and body mass index (BMI) of 36.0 to 36.9 in adult [facility]) 11/17/2017    COPD (chronic obstructive pulmonary disease) ([facility])     E. coli septicemia ([facility] [address] [facility]     E. coli UTI     Foot drop, right 07/10/2012    Fracture of femur ([facility] [address] [facility] 10/23/2016    Gait disorder 07/20/2016    Gastric out let obstruction     GERD (gastroesophageal reflux disease)     Hallux valgus, acquired, bilateral 06/24/2015    Hyperlipidemia     Hypertension     Impaired hearing 04/13/2015    Internal hemorrhoids 01/03/2017    Lymphedema of both lower extremities 06/24/2015    Nausea & vomiting 11/16/2018    OA (osteoarthritis) of knee, bilateral 12/11/2006    Obesity     MARIAMA on CPAP 05/02/2017    Osteoarthritis     Osteoarthritis of lumbar spine 12/13/2007     replace inactive diagnosis    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 coli (HCC)     Due to UTI     Sick sinus syndrome (HCC)     Simple chronic bronchitis (HCC) 04/10/2018    Slow transit constipation 11/03/2016    Unspecified sleep apnea     UTI (urinary tract infection)        Past Surgical History:   Past Surgical History:   Procedure Laterality Date    CARPAL TUNNEL RELEASE      bilateral    COLONOSCOPY      COLONOSCOPY N/A 04/05/2021    COLONOS[facility] DIAGNOSTIC [facility]  01/02/2019    by Dr. Bola Weeks N/A 01/02/2019    CYSTOSCOPY performed by Brynn Hagen MD at [address]    first knuckle right index finger    GASTRIC BYPASS SURGERY  1985    vertical banded gastroplasty   [address] REPLACEMENT  2004 & 2005    bilateral knees    PACEMAKER INSERTION  04/09/2021    [place], placed by Dr. Salamanca Son EGD [address] N/A 08/16/2018    EGD FOREIGN BODY REMOVAL performed by Ale Ingram MD at [address] EGD TRANSORAL BIOPSY SINGLE/MULTIPLE N/A 05/25/2018    EGD BIOPSY performed by Kiko Emanuel DO at [address] [facility] REPAIR      left shoulder    UPPER GASTROINTESTINAL ENDOSCOPY  08/13/2018    removal of food bolus    UPPER GASTROINTESTINAL ENDOSCOPY N/A 08/13/2018    EGD FOREIGN BODY REMOVAL performed by Aliya Vargas DO at [address] [place] [address] N/A 08/13/2018    EGD BIOPSY performed by Aliya Vargas DO at [address] [place] [address]  08/16/2018    egd with balloon dilitation    UPPER GASTROINTESTINAL ENDOSCOPY  08/16/2018    EGD DILATION BALLOON performed by Cait Pabon MD at [address] 10/01/2018    EGD BIOPSY performed by Gurjit Cuenca MD at [address] [place] [address]  10/01/2018    EGD DILATION BALLOON performed by Gurjit Cuenca MD at [address] [place] [address] N/A 11/19/2018    EGD DILATION BALLOON performed by Gurjit Cuenca MD at [address] [place] [address] N/A 10/15/2020    EGD SUBMUCOSAL/BOTOX INJECTION tattoo  performed by Chen Girard MD at [address] [place] [address] N/A 7/16/2021    EGD BIOPSY performed by Graham Victoria MD at [facility] History:  reports that he quit smoking about 45 years ago. He has a 20.00 pack-year smoking history. He has never used smokeless tobacco. He reports that he does not drink alcohol and does not use drugs. Family History: family history includes Cancer in his mother; Heart Attack in his father. Review of Systems:   General: Denies fever, chills, night sweats, weight loss, malaise, fatigue  HEENT: Denies sore throat, sinus problems, allergic rhinosinusitis  Card: Denies chest pain, palpitations, orthopnea/PND. Pulm: Denies cough, shortness of breath, JONES  GI: per HPI  : Denies polyuria, dysuria, hematuria  Heme: Denies anemia, h/o bleeding or clotting problems.    Neuro:  Denies h/o CVA, TIA  Skin: Denies rashes, ulcers  Musculoskeletal: Denies muscle, joint, back pain.     Allergies: Darvocet [propoxyphene n-acetaminophen], Other, Oxycodone-acetaminophen, and Propoxyphene    Current Meds:  Current Facility-Administered Medications:     sodium chloride flush 0.9 % injection 10 mL, 10 mL, IntraVENous, PRN, Queta Adler, DO, 10 mL at 01/07/22 0600    albuterol sulfate  (90 Base) MCG/ACT inhaler 2 puff, 2 puff, Inhalation, Q6H PRN, JULIAN Anderson - NP    benzonatate (TESSALON) capsule 100 mg, 100 mg, Oral, TID PRN, JULIAN Anderson - NP    bumetanide (BUMEX) injection 1 mg, 1 mg, IntraVENous, BID, JULIAN Anderson - NP    lipase-protease-amylase (CREON) delayed release capsule 24,000 Units, 24,000 Units, Oral, TID WC, JULIAN Andersno - NP    budesonide-formoterol (SYMBICORT) 160-4.5 MCG/ACT inhaler 2 puff, 2 puff, Inhalation, BID, JULIAN Anderson - NP    fluticasone (FLONASE) 50 MCG/ACT nasal spray 2 spray, 2 spray, Nasal, Daily, JULIAN Anderson - NP    ipratropium-albuterol (DUONEB) nebulizer solution 3 mL, 1 vial, Inhalation, Q4H PRN, JULIAN Anderson - NP    metoprolol succinate (TOPROL XL) extended release tablet 50 mg, 50 mg, Oral, Daily, Janel Alamo APRN - NP    pantoprazole (PROTONIX) tablet 40 mg, 1 tablet, Oral, Daily, Janel Alamo APRN - NP    rivaroxaban (XARELTO) tablet 20 mg, 1 tablet, Oral, Daily, JULIAN Anderson - NP    rOPINIRole (REQUIP) tablet 0.25 mg, 1 tablet, Oral, BID, Janel Alamo APRN - NP    tamsulosin (FLOMAX) capsule 0.4 mg, 0.4 mg, Oral, Daily, JULIAN Anderson - NP    tiZANidine (ZANAFLEX) tablet 4 mg, 4 mg, Oral, Q8H PRN, Marsa Cally, APRN - NP    sodium chloride flush 0.9 % injection 5-40 mL, 5-40 mL, IntraVENous, 2 times per day, Marsa Cally, APRN - NP    sodium chloride flush 0.9 % injection 10 mL, 10 mL, IntraVENous, PRN, Marsa Cally, APRN - NP    0.9 % sodium chloride infusion, 25 mL, IntraVENous, PRN, Marsa Cally, APRN - NP    potassium chloride (KLOR-CON M) extended release tablet 40 mEq, 40 mEq, Oral, PRN **OR** potassium bicarb-citric acid (EFFER-K) effervescent tablet 40 mEq, 40 mEq, Oral, PRN **OR** potassium chloride 10 mEq/100 mL IVPB (Peripheral Line), 10 mEq, IntraVENous, PRN, Keith Hodge, APRN - NP    magnesium sulfate 1000 mg in dextrose 5% 100 mL IVPB, 1,000 mg, IntraVENous, PRN, Keith Hodge APRN - NP    ondansetron (ZOFRAN-ODT) disintegrating tablet 4 mg, 4 mg, Oral, Q8H PRN **OR** ondansetron (ZOFRAN) injection 4 mg, 4 mg, IntraVENous, Q6H PRN, Keith Hodge APRN - NP    polyethylene glycol (GLYCOLAX) packet 17 g, 17 g, Oral, Daily PRN, Keith Hodge APRN - NP    dextrose 5 % in lactated ringers infusion, , IntraVENous, Continuous, Lane King, DO    famotidine (PEPCID) injection 20 mg, 20 mg, IntraVENous, BID, Lane King, DO    morphine (PF) injection 2 mg, 2 mg, IntraVENous, Q2H PRN, Wanda Guillen, DO    Vital Signs:  Vitals:    01/07/22 1000   BP: (!) 103/59   Pulse: 72   Resp: 18   Temp: 98.2 °F (36.8 °C)   SpO2: 98%       Physical Exam:  Vital signs and Nurse's note reviewed  Gen:  A&Ox3, mild distress due to nausea  HEENT: NCAT, PERRLA, EOMI, no scleral icterus, oral mucosa moist  Chest: Symmetric rise with inhalation, no evidence of trauma  CVS: Regular rate and rhythm  Resp: Good bilateral air entry, no inc WOB  Abd: softly distended, mild tenderness, no rebound or guarding, non peritoneal, previous long midline surgical scar I  s noted  CNS: Moves all extremities, no gross focal motor deficits  Skin: No erythema or ulcerations     Labs:  CBC   Lab Results   Component Value Date    WBC 9.5 01/07/2022    HGB 11.5 (L) 01/07/2022    HCT 36.3 (L) 01/07/2022    .8 01/07/2022     01/07/2022     BMP  Lab Results   Component Value Date    GLUCOSE 123 (H) 01/07/2022    CALCIUM 9.7 01/07/2022     01/07/2022    K 4.2 01/07/2022    CO2 28 01/07/2022    CL 96 (L) 01/07/2022    BUN 20 01/07/2022 CREATININE 0.96 01/07/2022     PT/INR   Lab Results   Component Value Date    INR 1.5 04/09/2021    INR 1.3 10/11/2020    INR 1.2 06/27/2020    PROTIME 17.7 (H) 04/09/2021    PROTIME 13.7 (H) 10/11/2020    PROTIME 12.3 (H) 06/27/2020     LFT   Lab Results   Component Value Date    AST 30 01/07/2022    ALT 12 01/07/2022    ALKPHOS 120 01/07/2022    ALBUMIN NOT REPORTED 01/07/2022    AMYLASE 15 (L) 05/26/2018    LIPASE 63 (H) 01/07/2022       CT ABDOMEN PELVIS W IV CONTRAST Additional Contrast? None    Result Date: 1/7/2022  EXAMINATION: CT OF THE ABDOMEN AND PELVIS WITH CONTRAST 1/7/2022 5:55 am TECHNIQUE: CT of the abdomen and pelvis was performed with the administration of intravenous contrast. Multiplanar reformatted images are provided for review. Dose modulation, iterative reconstruction, and/or weight based adjustment of the mA/kV was utilized to reduce the radiation dose to as low as reasonably achievable. COMPARISON: 2018 HISTORY: ORDERING SYSTEM PROVIDED HISTORY: Nausea, vomiting, right lower quadrant abdominal pain, small bowel obstruction versus appendicitis TECHNOLOGIST PROVIDED HISTORY: Nausea, vomiting, right lower quadrant abdominal pain, small bowel obstruction versus appendicitis Decision Support Exception - unselect if not a suspected or confirmed emergency medical condition->Emergency Medical Condition (MA) Reason for Exam: Nausea, vomiting, right lower quadrant abdominal pain, small bowel obstruction versus appendicitis FINDINGS: Lower Chest: Severe coronary artery calcification is seen. Trace pericardial fluid is seen. Streak artifact is seen from pacer. Aortic valve calcification is seen. Small hiatal hernia seen. Postsurgical changes seen at the GE junction. Organs: No splenomegaly. Splenic vascular calcifications are seen Adrenal glands appear normal No hydronephrosis on the left. No hydronephrosis on the right.  Circumscribed hypodense nodules are seen in the right and left kidney, similar to prior, likely cyst. A mildly hyperdense nodule projects posteriorly off the right kidney measuring 1.3 cm, likely cyst Gallbladder is distended. Gallstones are seen. There is trace intrahepatic biliary ductal dilatation No peripancreatic fluid GI/Bowel: Mild stool load is seen in the colon. Scattered colonic diverticula are seen Postsurgical changes seen in the stomach from gastric bypass. .  Stomach is distended. Scattered dilated loops of small bowel are seen. Distal small bowel loops are collapsed. There is likely transition point seen in the right paramidline region in the abdomen. Appendix is not clearly seen. Pelvis: Trace free fluid is seen in the pelvis. Calcifications seen in the pelvis, compatible with phleboliths. Peritoneum/Retroperitoneum: Atherosclerotic change seen in abdominal aorta. Atherosclerotic changes seen in the iliacs. Bones/Soft Tissues: Spurring is seen in the spine. Spurring is seen in the hip joints. Findings of small-bowel obstruction with suspected transition point in the lower anterior abdomen on the right. There is evidence of prior gastric bypass surgery. Small amount of reactive free fluid is seen in the pelvis Cholelithiasis. No cholecystitis Hyperdense nodule projecting posteriorly off the right kidney not clearly a simple cyst.  When the patient's acute symptoms have resolved, recommend follow-up abdominal CT or MRI with and without IV contrast, renal protocol to evaluate for any solid internal enhancement RECOMMENDATIONS: Unavailable     XR CHEST PORTABLE    Result Date: 1/7/2022  EXAMINATION: ONE XRAY VIEW OF THE CHEST 1/7/2022 5:15 am COMPARISON: 11/10/2021 HISTORY: ORDERING SYSTEM PROVIDED HISTORY: Nausea, vomiting, abd pain TECHNOLOGIST PROVIDED HISTORY: Nausea, vomiting, abd pain Reason for Exam: nv abd pain FINDINGS: Overlying left chest pacemaker. Prominent appearance of the cardiac silhouette. Low lung volumes.   Asymmetric elevation of the right hemidiaphragm. No focal consolidation or overt pulmonary edema. Costophrenic angles are sharp. No evidence of pneumothorax. No acute osseous abnormalities. Left humerus suture anchors. 1. Similar prominent appearance of the cardiac silhouette allowing for differences in technique. 2. No focal consolidation or overt pulmonary edema. Problem List:  Patient Active Problem List    Diagnosis Date Noted    SBO (small bowel obstruction) ([address] 01/07/2022    Aspiration pneumonia ([address] 07/14/2021    Pulmonary hypertension ([address] 05/06/2021    Presence of permanent cardiac pacemaker 05/06/2021    SIRS (systemic inflammatory response syndrome) ([address] 05/03/2021    Pneumonia-community-acquired 05/01/2021    Acute hypoxemic respiratory failure ([address] 05/01/2021    Pacemaker pocket hematoma, initial encounter     Atrial fibrillation with slow ventricular response ([address] 04/07/2021    BPH (benign prostatic hyperplasia) 03/22/2021    Myalgia 03/22/2021    Pneumonia due to organism 10/11/2020    History of bariatric surgery including banding leading to esophageal stricture and aspiration 10/11/2020    COPD (chronic obstructive pulmonary disease) ([address]     Chronic diastolic heart failure ([address]     Hospital-acquired bacterial pneumonia 06/27/2020    Non-intractable vomiting 11/16/2018    Simple chronic bronchitis ([address] 04/10/2018    MARIAMA on CPAP     Slow transit constipation 11/03/2016    Essential hypertension     Hallux valgus, acquired, bilateral 06/24/2015    Hearing impairment 06/24/2015    Foot drop, right 07/10/2012    Chronic atrial fibrillation (HCC)     Dyslipidemia     Gastroesophageal reflux disease without esophagitis     Osteoarthritis of lumbar spine 12/13/2007    OA (osteoarthritis) of knee, bilateral 12/11/2006       Impression:    Dev Robbins is a 77 y.o. male with SBO    Recommendation:    1. Admission to medicine team  2. NPO with NGT to LIWS  3. ivf hydration  4.  Monitor for bowel function  5. Serial abdominal exams  6. Hold all AC  7. Will attempt conservative treatment at this time. Patient softly distended on exam and non-peritoneal. NGT to be placed and decompress. Patient last dose of xarelto was yesterday. Please call with any acute changes in clinical exam.       Electronically signed by Jie Borden DO  on 1/7/2022 at 11:34 AM   I attest that I was present with the resident during the patient's evaluation in the emergency department [facility] and agree with the description of findings and plan as dictated above.   Simon Meléndez MD

## 2022-01-07 NOTE — PROGRESS NOTES
Physical Therapy    Facility/Department: [facility]  Initial Assessment    NAME: Vishal Tolliver  : 1945  MRN: [id number]    Date of Service: 2022    Discharge Recommendations:  Patient would benefit from continued therapy after discharge     Due to recent hospitalization and medical condition, pt would benefit from additional therapy at time of discharge to ensure safety. Please refer to the AM-PAC score for current functional status. PER HPI:  Vishal Tolliver is a 77 y.o. male, who presented to the hospital with complaints of abdominal pain. Patient claims that he has had intermittent diarrhea and nausea/vomiting over the past 2 days. He really claims that the pain started yesterday. He admits to feeling distended. His pain is diffuse over his abdomen. He denies any fevers or chills. Denies any difficulty breathing or chest pain. He admits to passing flatus yesterday as well as having a loose bowel movement yesterday. Denies any blood in his stool. Patient does have a past surgical history significant for a vertical banded gastroplasty done 20 years ago. He does also have a history of 4 to 5 months of nausea and emesis. He has seen Dr. Jaquan Wallace in the past who did an EGD with dilation. Dr. Erna Soares had recommended previously he follow-up at clinic. The patient claims he is in attempts to follow-up at the clinic. CT scan was done with a concern for small bowel obstruction. White blood cell count within normal limits. Lactic acid 2.1. Electrolytes within normal limits. Troponin 43. Assessment   Body structures, Functions, Activity limitations: Decreased functional mobility ; Decreased safe awareness;Decreased balance;Decreased strength;Decreased endurance  Assessment: Skilled therapy needed to maximize independence with functional mobility as well as improve LE strength, balance and endurance. Patient would benefit from additional therapy following discharge.   Prognosis: Good  Decision Making: Medium Complexity  Exam: AROM, strength, endurance, mobility, balance, AM-PAC  Clinical Presentation: evolving  PT Education: Goals;PT Role;Plan of Care;Gait Training;Transfer Training  REQUIRES PT FOLLOW UP: Yes  Activity Tolerance  Activity Tolerance: Patient limited by fatigue       Patient Diagnosis(es): The encounter diagnosis was SBO (small bowel obstruction) ([facility] [address] has a past medical history of Acute superficial gastritis without hemorrhage, Anastomotic stricture of stomach, Asthma, Atrial fibrillation ([address] Calculus of gallbladder without cholecystitis without obstruction, Chronic diastolic heart failure (HCC), Chronic rhinosinusitis, Class 2 severe obesity due to excess calories with serious comorbidity and body mass index (BMI) of 36.0 to 36.9 in [facility]), COPD (chronic obstructive pulmonary disease) ([facility] [facility] E. coli septicemia ([facility] [address] E. coli UTI, Foot drop, right, Fracture of femur ([address] Gait disorder, Gastric out let obstruction, GERD (gastroesophageal reflux disease), Hallux valgus, acquired, bilateral, Hyperlipidemia, Hypertension, Impaired hearing, Internal hemorrhoids, Lymphedema of both lower extremities, Nausea & vomiting, OA (osteoarthritis) of knee, bilateral, Obesity, MARIAMA on CPAP, Osteoarthritis, Osteoarthritis of lumbar spine, S/P revision of total knee, Septicemia due to E. coli ([address] Sick sinus syndrome ([address] Simple chronic bronchitis ([address] Slow transit constipation, Unspecified sleep apnea, and UTI (urinary tract infection). has a past surgical history that includes Finger amputation (1966); Gastric bypass surgery (1985); Carpal tunnel release; Colonoscopy; Rotator cuff repair; joint replacement (2004 & 2005); Hemorrhoid surgery; pr egd transoral biopsy single/multiple (N/A, 05/25/2018); Upper gastrointestinal endoscopy (08/13/2018); Upper gastrointestinal endoscopy (N/A, 08/13/2018); Upper gastrointestinal endoscopy (N/A, 08/13/2018);  Upper Shower/Tub: Tub/Shower unit  Bathroom Toilet: Handicap height  Bathroom Equipment: Grab bars in shower,Tub transfer bench,Toilet raiser,Grab bars around toilet  Home Equipment: Rolling walker,Cane,4 wheeled walker,Hospital bed (AFO for R drop foot)  Receives Help From: Family  ADL Assistance: Independent  Homemaking Assistance: Needs assistance (daughter does most cooking and cleaning)  Ambulation Assistance: Independent (uses RW)  Transfer Assistance: Independent  Active : No  Patient's  Info: family can drive him  Type of occupation: teaching special education  Leisure & Hobbies: interacting with people  Additional Comments: Pt denies any recent falls  Cognition   Cognition  Overall Cognitive Status: WFL    Objective     Observation/Palpation  Observation: resting in bed on bedpan, NG tube, R UE IV  Edema: wearing knee high compression stockings    AROM RLE (degrees)  RLE AROM: WFL  RLE General AROM: except trace ankle dorsiflexion  AROM LLE (degrees)  LLE AROM : WFL  LLE General AROM: except trace ankle dorsiflexion  Strength RLE  Comment: ankle DF 2-/5, PF 2+/5, knee 4/5, hip 4-/5  Strength LLE  Comment: ankle 2/5, knee/hip 4/5  Strength RUE  Comment: refer to OT assessment  Strength LUE  Comment: refer to OT assessment  Motor Control  Gross Motor?: WFL  Sensation  Overall Sensation Status: Impaired (intermittent N/T B hands)  Bed mobility  Rolling to Left: Minimal assistance  Supine to Sit: Moderate assistance;2 Person assistance (patient pulled on therapist instead of pushing up with UEs)  Sit to Supine: Minimal assistance  Comment: sat EOB SBA was slightly dizzy when first sitting.  Assisted patient with donning shoes/AFOs  Transfers  Sit to Stand: Contact guard assistance  Stand to sit: Contact guard assistance  Comment: good hand placement, initially dizzy when first standing, improved after standing for a minute, assist patient with pulling brief up  Ambulation  Ambulation?: Yes  More Ambulation?: No  Ambulation 1  Surface: level tile  Device: Rolling Walker  Other Apparatus: AFO; Left;Right  Assistance: Contact guard assistance  Gait Deviations: Slow Zoie;Decreased step length  Distance: 20 feet  Comments: distance limited by NG tube     Balance  Sitting - Static: Good  Sitting - Dynamic: Fair;+  Standing - Static: Fair;+  Standing - Dynamic: [address]  Times per week: 1-2x/day for 5-6 days/week  Current Treatment Recommendations: Strengthening,Transfer Training,Patient/Caregiver Education & Training,Endurance Training,Balance Training,Gait Training,Safety Education & Training,Positioning,Home Exercise Program  Safety Devices  Type of devices: Left in bed,Call light within reach,Bed alarm in place,Nurse notified      AM-PAC Score  AM-PAC Inpatient Mobility Raw Score : 16 (01/07/22 1448)  AM-PAC Inpatient T-Scale Score : 40.78 (01/07/22 1448)  Mobility Inpatient CMS 0-100% Score: 54.16 (01/07/22 1448)  Mobility Inpatient CMS G-Code Modifier : CK (01/07/22 1448)          Goals  Short term goals  Time Frame for Short term goals: 12 visits  Short term goal 1: Independent bed mobility  Short term goal 2: Independent transfers  Short term goal 3: Patient to ambulate 50 feet with roll walker and B AFOs modified independent  Patient Goals   Patient goals : to go home       Therapy Time   Individual Concurrent Group Co-treatment   Time In 1350         Time Out 1423 (additional 10 minutes for chart review)         Minutes 33+10=43              Co-treatment with OT warranted secondary to decreased safety and independence requiring 2 skilled therapy professionals to address individual discipline's goals. PT addressing transfer training.   Treatment Time: 24 minutes    Mindy Sandhu PT

## 2022-01-07 NOTE — CARE COORDINATION
Case Management Initial Discharge Plan  Méndez Rosendo,         Readmission Risk              Risk of Unplanned Readmission:  20             Met with:patient to discuss discharge plans. Information verified: address, contacts, phone number, , insurance Yes  PCP: JULIAN Verde CNP  Date of last visit: Dec 2021    Insurance Provider: [facility] [place] [facility]    Discharge Planning  Current Residence:  Private home  Living Arrangements:    lives with wife and dtr  Home has 2 stories/4outside stairs to climb. Bedroom and bathroom on first floor  Support Systems:     Current Services PTA:  none Agency: none  Patient able to perform ADL's:Assisted  DME in home:  Jeanie Isrrael, 4 wheel walker, hospital bed, cane, shower seat, elevated toilet with grab bars  DME used to aid ambulation prior to admission:   Walker prn  DME used during admission:  NG tube    Potential Assistance Needed:       Pharmacy:    Potential Assistance Purchasing Medications:     Does patient want to participate in local refill/ meds to beds program?  No    Patient agreeable to home care: No  [facility] of choice provided:  n/a      Type of Home Care Services:     Patient expects to be discharged to:       Prior SNF/Rehab Placement and Facility: none  Agreeable to SNF/Rehab: No  [facility] of choice provided: n/a   Evaluation: n/a    Expected Discharge date: Follow Up Appointment: Best Day/ Time:      Transportation provider: friend  Transportation arrangements needed for discharge: No    Discharge Plan:   Met with patient to review plan of care. Pt lives with his wife and daughter. He does not drive and depends on cabs for Dr lai. Uses bus to get groceries and errands. Discussed with patient physical therapy's recommendation for home care.   He is not interested as he has used [facility] in the past and he has very limited visits and did not feel it was beneficial. He states his dtr and wife can provide any assistance he needs.    Surgery consulted for SBO plan conservation treatment. PLAN  Home with wife and dtr.   Refuses home care  Denies need for any DME        Electronically signed by Kirill Alicia on 1/7/22 at 4:40 PM EST

## 2022-01-07 NOTE — FLOWSHEET NOTE
Patient is awake and alert while sitting up in bed. Patient's spouse is bedside. Patient has an NG tube and states that he is not feeling well. Patient denies and spiritual or emotional concerns but then requests a prayer. Writer offers a prayer for healing, peace, and rest. Patient requests a Bible and writer retrieves on for him. Patient reports that he attends [facility] when he is able. Patient and spouse express appreciation for the visit. Spiritual Care will follow as needed. 01/07/22 1542   Encounter Summary   Services provided to: Patient and family together   Referral/Consult From: [employer] [facility]; [facility]   [facility]  [facility] No   Continue Visiting   (1/7/22)   Complexity of Encounter Moderate   Length of Encounter 15 minutes   Spiritual Assessment Completed Yes   Routine   Type Initial   Assessment Approachable   Intervention Active listening;Explored feelings, thoughts, concerns;Explored coping resources;Nurtured hope;Prayer   Outcome Expressed gratitude

## 2022-01-07 NOTE — RT PROTOCOL NOTE
RT Inhaler-Nebulizer Bronchodilator Protocol Note    There is a bronchodilator order in the chart from a provider indicating to follow the RT Bronchodilator Protocol and there is an Initiate RT Inhaler-Nebulizer Bronchodilator Protocol order as well (see protocol at bottom of note). CXR Findings:  XR CHEST PORTABLE    Result Date: 1/7/2022  1. Similar prominent appearance of the cardiac silhouette allowing for differences in technique. 2. No focal consolidation or overt pulmonary edema. The findings from the last RT Protocol Assessment were as follows:   History Pulmonary Disease: None or smoker <15 pack years  Respiratory Pattern: Regular pattern and RR 12-20 bpm  Breath Sounds: Clear breath sounds  Cough: Strong, spontaneous, non-productive  Indication for Bronchodilator Therapy:    Bronchodilator Assessment Score: 0    Aerosolized bronchodilator medication orders have been revised according to the RT Inhaler-Nebulizer Bronchodilator Protocol below. Respiratory Therapist to perform RT Therapy Protocol Assessment initially then follow the protocol. Repeat RT Therapy Protocol Assessment PRN for score 0-3 or on second treatment, BID, and PRN for scores above 3. No Indications - adjust the frequency to every 6 hours PRN wheezing or bronchospasm, if no treatments needed after 48 hours then discontinue using Per Protocol order mode. If indication present, adjust the RT bronchodilator orders based on the Bronchodilator Assessment Score as indicated below. Use Inhaler orders unless patient has one or more of the following: on home nebulizer, not able to hold breath for 10 seconds, is not alert and oriented, cannot activate and use MDI correctly, or respiratory rate 25 breaths per minute or more, then use the equivalent nebulizer order(s) with same Frequency and PRN reasons based on the score. If a patient is on this medication at home then do not decrease Frequency below that used at home.     0-3 - enter or revise RT bronchodilator order(s) to equivalent RT Bronchodilator order with Frequency of every 4 hours PRN for wheezing or increased work of breathing using Per Protocol order mode. 4-6 - enter or revise RT Bronchodilator order(s) to two equivalent RT bronchodilator orders with one order with BID Frequency and one order with Frequency of every 4 hours PRN wheezing or increased work of breathing using Per Protocol order mode. 7-10 - enter or revise RT Bronchodilator order(s) to two equivalent RT bronchodilator orders with one order with TID Frequency and one order with Frequency of every 4 hours PRN wheezing or increased work of breathing using Per Protocol order mode. 11-13 - enter or revise RT Bronchodilator order(s) to one equivalent RT bronchodilator order with QID Frequency and an Albuterol order with Frequency of every 4 hours PRN wheezing or increased work of breathing using Per Protocol order mode. Greater than 13 - enter or revise RT Bronchodilator order(s) to one equivalent RT bronchodilator order with every 4 hours Frequency and an Albuterol order with Frequency of every 2 hours PRN wheezing or increased work of breathing using Per Protocol order mode. RT to enter RT Home Evaluation for COPD & MDI Assessment order using Per Protocol order mode.     Electronically signed by yarely agarwal RCP on 1/7/2022 at 12:08 PM

## 2022-01-07 NOTE — ED NOTES
Attempted report. RN unavailable at this time and will return call.       Oscar Reyes RN  01/07/22 8311

## 2022-01-07 NOTE — H&P
[facility]  Office: [address], , Darrenrima Mcallister, DO, Shaneramon Green, DO, Ganesh Burch, DO, Chastity Carmichael MD, Aguilar Salinas MD, Jerry Larson MD, Jet Rowe MD, Cathy Johnson MD, Geovanny Weber MD, Cory Ram MD, Scarlet Banuelos DO, Saranya Duff DO, Chesley Saint, MD,  Glynda Fothergill, DO, Omega Clay MD, Victor Manuel Woo MD, Julio Cesar Mejía MD, Shelli Soto MD, Kodak Ayers MD, Zain Perez MD, Trice Trejo MD, Rylee Vieira [facility], [facility], CNP, Dennie Hageman, CNP, Thalia Crisostomo, CNS, Geetha Montero, CNP, Zackary Lou, CNP, Tommy Singh, CNP, Robert Crawford, CNP, Tanvi Huynh, CNP, Dinesh Saucedo PA-C, Arpan Rivera, DNP, Howard Sosa, DNP, Maribel Carlos, CNP, Jerry Ellis, CNP, Jayce Gallego, CNP, Fifi Brizuela, CNP, Nila Bain, CNP, Jillian Schaffer, [facility]    HISTORY AND PHYSICAL EXAMINATION            Date:   1/7/2022  Patient name:  Priya Mesa  Date of admission:  1/7/2022  4:49 AM  MRN:   [id number]  Account:  [de-identified]  YOB: 1945  PCP:    JULIAN Woods CNP  Room:   2014/2014-02  Code Status:    Full Code    Chief Complaint:     Chief Complaint   Patient presents with    Abdominal Pain     RLQ    Emesis     x2 days       History Obtained From:     patient    History of Present Illness:     Priya Mesa is a 77 y.o. Non- / non  male who presents with Abdominal Pain (RLQ) and Emesis (x2 days)   and is admitted to the hospital for the management of SBO (small bowel obstruction) ([facility] [address] 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 Patient reports she has dealt with these for many years. Patient reports that approximately 36 hours ago he developed right lower quadrant pain followed by severe nausea.   He reported that this resolved and improved temporarily and then returned approximately 12 hours ago significantly worse. Patient reports that his pain was at a 9/10 and was a cramping pain in nature. Patient was quickly followed by severe nausea and intractable vomiting. Patient reported to the emergency department where he was found to have findings consistent with small bowel obstruction. Patient reports that since this morning he has been incontinent of stool. Patient is scheduled to have a evaluation at a tertiary center in [facility] with their gastroenterology team due to his recurrent abdominal problems. ER did consult general surgery at this facility and they are comfortable managing case care. Patient will be managed medically for the time being.     Past Medical History:     Past Medical History:   Diagnosis Date    Acute superficial gastritis without hemorrhage 05/26/2018    Anastomotic stricture of stomach     Asthma     Atrial fibrillation ([address]     On Xarelto 6/27/2020    Calculus of gallbladder without cholecystitis without obstruction 05/02/2017    Chronic diastolic heart failure ([address] 11/17/2017    Chronic rhinosinusitis 04/13/2015    Class 2 severe obesity due to excess calories with serious comorbidity and body mass index (BMI) of 36.0 to 36.9 in adult [facility]) 11/17/2017    COPD (chronic obstructive pulmonary disease) ([facility] [address]     E. coli septicemia ([facility])     E. coli UTI     Foot drop, right 07/10/2012    Fracture of femur ([address] 10/23/2016    Gait disorder 07/20/2016    Gastric out let obstruction     GERD (gastroesophageal reflux disease)     Hallux valgus, acquired, bilateral 06/24/2015    Hyperlipidemia     Hypertension     Impaired hearing 04/13/2015    Internal hemorrhoids 01/03/2017    Lymphedema of both lower extremities 06/24/2015    Nausea & vomiting 11/16/2018    OA (osteoarthritis) of knee, bilateral 12/11/2006    Obesity     MARIAMA on CPAP 05/02/2017    Osteoarthritis     Osteoarthritis of lumbar spine 12/13/2007     replace inactive diagnosis    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 coli (HCC)     Due to UTI     Sick sinus syndrome (HCC)     Simple chronic bronchitis (HCC) 04/10/2018    Slow transit constipation 11/03/2016    Unspecified sleep apnea     UTI (urinary tract infection)         Past Surgical History:     Past Surgical History:   Procedure Laterality Date    CARPAL TUNNEL RELEASE      bilateral    COLONOSCOPY      COLONOSCOPY N/A 04/05/2021    COLONOSCOPY DIAGNOSTIC performed by Sheila Sands MD at [facility].  01/02/2019    by Dr. Mele Rome N/A 01/02/2019    CYSTOSCOPY performed by Mele Zamora MD at [address]    first knuckle right index finger   [address]    vertical banded gastroplasty   [address] REPLACEMENT  2004 & 2005    bilateral knees    PACEMAKER INSERTION  04/09/2021    [place], placed by Dr. Rose Mary Nash EGD [address] N/A 08/16/2018    EGD FOREIGN BODY REMOVAL performed by Rossy Mac MD at [address] EGD TRANSORAL BIOPSY SINGLE/MULTIPLE N/A 05/25/2018    EGD BIOPSY performed by Renny Rodgers DO at [address] [facility]      left shoulder    UPPER GASTROINTESTINAL ENDOSCOPY  08/13/2018    removal of food bolus    UPPER GASTROINTESTINAL ENDOSCOPY N/A 08/13/2018    EGD FOREIGN BODY REMOVAL performed by Renny Rodgers DO at [address] 08/13/2018    EGD BIOPSY performed by Renny Rodgers DO at [address]  08/16/2018    egd with balloon dilitation    UPPER GASTROINTESTINAL ENDOSCOPY  08/16/2018    EGD DILATION BALLOON performed by Rossy Mac MD at [address] 10/01/2018    EGD BIOPSY performed by Sheila Sands MD at [address] [place] [address] 10/01/2018    EGD DILATION BALLOON performed by Gilles Boast, MD at [address] [place] [address] N/A 11/19/2018    EGD DILATION BALLOON performed by Gilles Boast, MD at [address] [place] [address] N/A 10/15/2020    EGD SUBMUCOSAL/BOTOX INJECTION tattoo  performed by Vincent Kay MD at [address] [place] [address] N/A 7/16/2021    EGD BIOPSY performed by Osvaldo Tubbs MD at [address] [place] [address]        Medications Prior to Admission:     Prior to Admission medications    Medication Sig Start Date End Date Taking? Authorizing Provider   fluticasone (FLONASE) 50 MCG/ACT nasal spray 2 sprays by Nasal route daily 12/29/21   [facility], APRN - CNP   Multiple Vitamin (MULTI-VITAMINS) TABS TAKE 1 TABLET BY MOUTH DAILY 12/10/21   [facility], APRN - CNP   potassium chloride (KLOR-CON M) 20 MEQ extended release tablet TAKE 1 TABLET BY MOUTH TWICE DAILY 12/10/21   [facility], APRN - CNP   docusate sodium (COLACE) 100 MG capsule TAKE 1 CAPSULE BY MOUTH TWICE DAILY AS NEEDED FOR CONSTIPATION 12/10/21   [facility], APRN - CNP   tiZANidine (ZANAFLEX) 4 MG tablet TAKE 1 TABLET BY MOUTH DAILY AS NEEDED 12/10/21   [facility], APRN - CNP   rOPINIRole (REQUIP) 0.25 MG tablet TAKE 1 TABLET BY MOUTH TWICE DAILY 11/15/21   [facility], APRN - CNP   cetirizine (ZYRTEC) 10 MG tablet TAKE 1 TABLET BY MOUTH DAILY 11/15/21   [facility], APRN - CNP   bumetanide (BUMEX) 1 MG tablet TAKE 2 TABLETS BY MOUTH EVERY MORNING AND TAKE 1 TABLET BY MOUTH IN THE AFTERNOON AND 1 TABLET EVERY EVENING IF WEIGHT IS >3 ABOVE BASELINE 11/15/21   [facility], APRN - CNP   pantoprazole (PROTONIX) 40 MG tablet TAKE 1 TABLET BY MOUTH DAILY 11/15/21   [facility], APRN - CNP   gabapentin (NEURONTIN) 300 MG capsule Take 1 capsule by mouth 3 times daily for 28 days.  11/10/21 12/8/21  [facility], APRN - CNP   tamsulosin (FLOMAX) 0.4 MG capsule Take 1 capsule by mouth daily 10/19/21   Elvin GarciaJULIAN CNP   CREON 11721-95863 units delayed release capsule TAKE 1 CAPSULE BY MOUTH THREE TIMES DAILY WITH MEALS 9/20/21   JULIAN Lam CNP   metoprolol succinate (TOPROL XL) 50 MG extended release tablet TAKE 1 TABLET BY MOUTH AT BEDTIME 9/20/21   Leesaesmefrancie NobleAlbanianJULIAN hoang CNP   rivaroxaban (XARELTO) 20 MG TABS tablet TAKE 1 TABLET BY MOUTH DAILY 8/27/21   StewartJULIAN Goodrich CNP   Fluticasone furoate-vilanterol (BREO ELLIPTA) 200-25 MCG/INH AEPB inhaler INHALE 1 PUFF INTO THE LUNGS DAILY **RINSE MOUTH AFTER EACH USE**    Historical Provider, MD   Nutritional Supplements (BOOST) LIQD 1 can twice a day for meals 7/28/21   Elvin Garcia, APRN - CNP   loratadine (CLARITIN) 10 MG tablet Take 1 tablet by mouth daily 4/27/21   JULIAN Lam CNP   albuterol sulfate HFA (VENTOLIN HFA) 108 (90 Base) MCG/ACT inhaler Inhale 2 puffs into the lungs every 6 hours as needed for Wheezing or Shortness of Breath    Historical Provider, MD   benzonatate (TESSALON) 100 MG capsule TAKE 1 CAPSULE BY MOUTH THREE TIMES DAILY AS NEEDED FOR COUGH 3/31/21   Luca Villegas PA-C   vitamin B-12 (CYANOCOBALAMIN) 100 MCG tablet TAKE 1 TABLET BY MOUTH DAILY AS NEEDED FOR FATIGUE 3/27/21   Luca Villegas PA-C   carboxymethylcellulose (REFRESH PLUS) 0.5 % SOLN ophthalmic solution Apply 1 drop to eye 4 times daily    Historical Provider, MD   Menthol-Methyl Salicylate ([facility]) 0.5-15 % CREA Apply topically 2 times daily as needed 6/26/20   Historical Provider, MD   ascorbic acid (VITAMIN C) 500 MG tablet Take 1 tablet by mouth daily 10/26/20   Luca Villegas PA-C   ferrous sulfate (FE TABS 325) 325 (65 Fe) MG EC tablet Take 1 tablet by mouth 2 times daily (with meals) 10/16/20   Jared Suaerz PA-C   SM LUBRICANT EYE DROPS 0.4-0.3 % ophthalmic solution PLACE 1 DROP INTO BOTH EYES FOUR TIMES DAILY AS NEEDED FOR DRY EYES 10/17/19   Luca Villegas PA-C Handicap Placard MISC by Does not apply route 6/27/19   John Zamora PA-C   Incontinence Supply Disposable (INCONTINENCE BRIEF LARGE) MISC To use as directed. Use 3x a day 4/30/19   John Zamora PA-C   Foot Care Products (TRI-BALANCE ORTHOTICS MENS) MISC Provide insurance covered orthotics shoes, wear daily 12/12/18   John Zamora PA-C   ipratropium-albuterol (DUONEB) 0.5-2.5 (3) MG/3ML SOLN nebulizer solution Inhale 1 vial into the lungs every 4 hours as needed     Historical Provider, MD   Armodafinil 200 MG TABS Take 1 tablet by mouth 2 times daily. 4/2/18   Historical Provider, MD        Allergies:     Darvocet [propoxyphene n-acetaminophen], Other, Oxycodone-acetaminophen, and Propoxyphene    Social History:     Tobacco:    reports that he quit smoking about 45 years ago. He has a 20.00 pack-year smoking history. He has never used smokeless tobacco.  Alcohol:      reports no history of alcohol use. Drug Use:  reports no history of drug use. Family History:     Family History   Problem Relation Age of Onset    Cancer Mother     Heart Attack Father        Review of Systems:     Positive and Negative as described in HPI. Review of Systems   Constitutional: Positive for activity change. Negative for fatigue and fever. HENT: Negative for sinus pressure and sinus pain. Eyes: Negative for photophobia and visual disturbance. Respiratory: Negative for shortness of breath and wheezing. Cardiovascular: Negative for chest pain and palpitations. Gastrointestinal: Positive for abdominal distention, abdominal pain, nausea, rectal pain and vomiting. Endocrine: Negative for polydipsia and polyuria. Genitourinary: Negative for frequency and urgency. Musculoskeletal: Negative for arthralgias and myalgias. Skin: Negative for color change and rash. Allergic/Immunologic: Negative for environmental allergies, food allergies and immunocompromised state.    Neurological: Negative for dizziness, weakness and headaches. Hematological: Negative for adenopathy. Does not bruise/bleed easily. Psychiatric/Behavioral: Negative for agitation and confusion. The patient is not hyperactive. Physical Exam:   BP (!) 100/56   Pulse 69   Temp 97.8 °F (36.6 °C) (Oral)   Resp 16   Ht 5' 10\" (1.778 m)   Wt 191 lb (86.6 kg)   SpO2 99%   BMI 27.41 kg/m²   Temp (24hrs), Av.1 °F (36.7 °C), Min:97.8 °F (36.6 °C), Max:98.2 °F (36.8 °C)    No results for input(s): POCGLU in the last 72 hours. Intake/Output Summary (Last 24 hours) at 2022 1645  Last data filed at 2022 1447  Gross per 24 hour   Intake 222.74 ml   Output 850 ml   Net -627.26 ml       Physical Exam  Constitutional:       Appearance: He is obese. He is ill-appearing. Cardiovascular:      Rate and Rhythm: Normal rate. Rhythm irregular. Pulses: Normal pulses. Heart sounds: No murmur heard. No gallop. Pulmonary:      Effort: No respiratory distress. Breath sounds: No stridor. No wheezing. Abdominal:      General: Bowel sounds are decreased. There is distension. Palpations: There is no mass. Tenderness: There is abdominal tenderness (rlq). There is no rebound. Hernia: No hernia is present. Neurological:      General: No focal deficit present. Mental Status: He is alert and oriented to person, place, and time.    Psychiatric:         Mood and Affect: Mood normal.         Behavior: Behavior normal.         Investigations:      Laboratory Testing:  Recent Results (from the past 24 hour(s))   CBC Auto Differential    Collection Time: 22  4:55 AM   Result Value Ref Range    WBC 9.5 3.5 - 11.3 k/uL    RBC 3.60 (L) 4.21 - 5.77 m/uL    Hemoglobin 11.5 (L) 13.0 - 17.0 g/dL    Hematocrit 36.3 (L) 40.7 - 50.3 %    .8 82.6 - 102.9 fL    MCH 31.9 25.2 - 33.5 pg    MCHC 31.7 28.4 - 34.8 g/dL    RDW 13.2 11.8 - 14.4 %    Platelets 225 893 - 066 k/uL    MPV 8.8 8.1 - 13.5 fL    NRBC Automated 0.0 0.0 per 100 WBC    Differential Type NOT REPORTED     Seg Neutrophils 85 (H) 36 - 65 %    Lymphocytes 8 (L) 24 - 43 %    Monocytes 6 3 - 12 %    Eosinophils % 1 1 - 4 %    Basophils 0 0 - 2 %    Immature Granulocytes 0 0 %    Segs Absolute 8.04 1.50 - 8.10 k/uL    Absolute Lymph # 0.75 (L) 1.10 - 3.70 k/uL    Absolute Mono # 0.56 0.10 - 1.20 k/uL    Absolute Eos # 0.10 0.00 - 0.44 k/uL    Basophils Absolute 0.04 0.00 - 0.20 k/uL    Absolute Immature Granulocyte 0.02 0.00 - 0.30 k/uL    WBC Morphology NOT REPORTED     RBC Morphology NOT REPORTED     Platelet Estimate NOT REPORTED    Comprehensive Metabolic Panel    Collection Time: 01/07/22  4:55 AM   Result Value Ref Range    Glucose 123 (H) 70 - 99 mg/dL    BUN 20 8 - 23 mg/dL    CREATININE 0.96 0.70 - 1.20 mg/dL    Bun/Cre Ratio 21 (H) 9 - 20    Calcium 9.7 8.6 - 10.4 mg/dL    Sodium 136 135 - 144 mmol/L    Potassium 4.2 3.7 - 5.3 mmol/L    Chloride 96 (L) 98 - 107 mmol/L    CO2 28 20 - 31 mmol/L    Anion Gap 12 9 - 17 mmol/L    Alkaline Phosphatase 120 40 - 129 U/L    ALT 12 5 - 41 U/L    AST 30 <40 U/L    Total Bilirubin 0.73 0.3 - 1.2 mg/dL    Total Protein 7.3 6.4 - 8.3 g/dL    Albumin 4.3 3.5 - 5.2 g/dL    Albumin/Globulin Ratio NOT REPORTED 1.0 - 2.5    GFR Non-African American >60 >60 mL/min    GFR African American >60 >60 mL/min    GFR Comment          GFR Staging NOT REPORTED    Lipase    Collection Time: 01/07/22  4:55 AM   Result Value Ref Range    Lipase 63 (H) 13 - 60 U/L   Lactic Acid    Collection Time: 01/07/22  4:55 AM   Result Value Ref Range    Lactic Acid 2.1 0.5 - 2.2 mmol/L   Troponin    Collection Time: 01/07/22  4:55 AM   Result Value Ref Range    Troponin, High Sensitivity 43 (H) 0 - 22 ng/L    Troponin T NOT REPORTED <0.03 ng/mL    Troponin Interp NOT REPORTED    Brain Natriuretic Peptide    Collection Time: 01/07/22  4:55 AM   Result Value Ref Range    Pro-BNP 5,319 (H) <300 pg/mL    BNP Interpretation NOT REPORTED    EKG 12 Lead    Collection Time: 01/07/22  5:14 AM   Result Value Ref Range    Ventricular Rate 70 BPM    Atrial Rate 88 BPM    QRS Duration 170 ms    Q-T Interval 510 ms    QTc Calculation (Bazett) 550 ms    R Axis -79 degrees    T Axis 62 degrees   Troponin    Collection Time: 01/07/22  7:47 AM   Result Value Ref Range    Troponin, High Sensitivity 42 (H) 0 - 22 ng/L    Troponin T NOT REPORTED <0.03 ng/mL    Troponin Interp NOT REPORTED    Urinalysis Reflex to Culture    Collection Time: 01/07/22  1:54 PM    Specimen: Urine, clean catch   Result Value Ref Range    Color, UA Yellow Yellow    Turbidity UA Clear Clear    Glucose, Ur NEGATIVE NEGATIVE    Bilirubin Urine NEGATIVE NEGATIVE    Ketones, Urine NEGATIVE NEGATIVE    Specific Gravity, UA 1.010 1.005 - 1.030    Urine Hgb NEGATIVE NEGATIVE    pH, UA 5.5 5.0 - 8.0    Protein, UA NEGATIVE NEGATIVE    Urobilinogen, Urine Normal Normal    Nitrite, Urine NEGATIVE NEGATIVE    Leukocyte Esterase, Urine NEGATIVE NEGATIVE    Urinalysis Comments NOT REPORTED        Imaging/Diagnostics:  CT ABDOMEN PELVIS W IV CONTRAST Additional Contrast? None    Result Date: 1/7/2022  Findings of small-bowel obstruction with suspected transition point in the lower anterior abdomen on the right. There is evidence of prior gastric bypass surgery. Small amount of reactive free fluid is seen in the pelvis Cholelithiasis. No cholecystitis Hyperdense nodule projecting posteriorly off the right kidney not clearly a simple cyst.  When the patient's acute symptoms have resolved, recommend follow-up abdominal CT or MRI with and without IV contrast, renal protocol to evaluate for any solid internal enhancement RECOMMENDATIONS: Unavailable     XR CHEST PORTABLE    Result Date: 1/7/2022  1. Similar prominent appearance of the cardiac silhouette allowing for differences in technique. 2. No focal consolidation or overt pulmonary edema.      XR ABDOMEN FOR NG/OG/NE TUBE PLACEMENT    Result Date: 1/7/2022  1. Gastric tube tip terminates in the region of the gastroesophageal junction. Recommend further advancement. 2. Multiple dilated loops of small bowel in the visualized abdomen likely correlating with findings on prior CT. Assessment :      Hospital Problems           Last Modified POA    * (Principal) SBO (small bowel obstruction) ([address] 1/7/2022 Yes    Chronic atrial fibrillation ([address] 1/7/2022 Yes    Dyslipidemia 1/7/2022 Yes    Gastroesophageal reflux disease without esophagitis 1/7/2022 Yes    Essential hypertension 1/7/2022 Yes    Slow transit constipation 1/7/2022 Yes    COPD (chronic obstructive pulmonary disease) ([address] 1/7/2022 Yes    Chronic diastolic heart failure ([address] 1/7/2022 Yes    BPH (benign prostatic hyperplasia) 1/7/2022 Yes          Plan:     Patient status inpatient in the  Med/Surge    1. Small bowel obstruction with abdominal pain and intractable nausea  1. NG in place by ER, maintain LIS, there is scant grant blood in the NG tube, this is likely secondary to NG tube placement trauma in conjunction with daily Xarelto use  2. General surgery on standby, soapsuds enema ordered, patient did have immediate bowel movement prior to administration. 2. GERD and a personal history of slow transit constipation  1. Continue IV hydration and continue frequent ambulation  2. Continue PPI  3. Chronic diastolic heart failure  1. Very gentle IV hydration  2. Cardiology consultation for possible presurgical clearance  4. COPD with exertional dyspnea  1. Continue inhalers on home dosage regiment  2. Schedule and as needed breathing treatments  5. Chronic A. fib: Chronic anticoagulation  1.  Continue Xarelto, will need to hold if general surgery intends intervention    Consultations:   IP CONSULT TO HOSPITALIST  IP CONSULT TO GENERAL SURGERY    Patient is admitted as inpatient status because of co-morbidities listed above, severity of signs and symptoms as outlined, requirement for current medical therapies and most importantly because of direct risk to patient if care not provided in a hospital setting. Expected length of stay > 48 hours.     JULIAN Sanchez NP  1/7/2022  4:44 PM    Copy sent to JULIAN Wilson - CNP

## 2022-01-07 NOTE — PROGRESS NOTES
Occupational Therapy   Occupational Therapy Initial Assessment  Date: 2022   Patient Name: Michelle Burnett  MRN: [id number]     : 1945    RN reports patient is medically stable for therapy treatment this date. Chart reviewed prior to treatment and patient is agreeable for therapy. All lines intact and patient positioned comfortably at end of treatment. All patient needs addressed prior to ending therapy session. Date of Service: 2022    Discharge Recommendations:  Patient would benefit from continued therapy after discharge   RN reports patient is medically stable for therapy treatment this date. Chart reviewed prior to treatment and patient is agreeable for therapy. All lines intact and patient positioned comfortably at end of treatment. All patient needs addressed prior to ending therapy session. OT Equipment Recommendations  Equipment Needed: Yes  Mobility Devices: ADL Assistive Devices  ADL Assistive Devices: Long-handled Shoe Horn;Long-handled Sponge;Reacher;Sock-Aid Hard    Assessment   Performance deficits / Impairments: Decreased ADL status; Decreased functional mobility ; Decreased strength;Decreased high-level IADLs;Decreased endurance;Decreased safe awareness;Decreased cognition;Decreased balance;Decreased posture;Decreased fine motor control  Assessment: Pt would benefit from continued skilled OT services to increase I and safety during functional tasks to return home at prior level of function as able. Prognosis: Good  Decision Making: Medium Complexity  OT Education: OT Role;Transfer Training;Energy Conservation;Plan of Care;ADL Adaptive Strategies; Family Education  Patient Education: benefits of being oob, safety in function, fall prevention/call light use, recommendations for continued therapy, OT POC  REQUIRES OT FOLLOW UP: Yes  Activity Tolerance  Activity Tolerance: Patient Tolerated treatment well;Patient limited by fatigue  Activity Tolerance: fair  Safety Devices  Safety Devices in place: Yes  Type of devices: Nurse notified;Gait belt;Bed alarm in place;Call light within reach; Patient at risk for falls; Left in bed (Pts wife in room at end of session)           Patient Diagnosis(es): The encounter diagnosis was SBO (small bowel obstruction) ([facility] has a past medical history of Acute superficial gastritis without hemorrhage, Anastomotic stricture of stomach, Asthma, Atrial fibrillation ([address] Calculus of gallbladder without cholecystitis without obstruction, Chronic diastolic heart failure (HCC), Chronic rhinosinusitis, Class 2 severe obesity due to excess calories with serious comorbidity and body mass index (BMI) of 36.0 to 36.9 in [facility]), COPD (chronic obstructive pulmonary disease) ([facility] [address] [facility] E. coli septicemia ([facility] [address] E. coli UTI, Foot drop, right, Fracture of femur ([address] Gait disorder, Gastric out let obstruction, GERD (gastroesophageal reflux disease), Hallux valgus, acquired, bilateral, Hyperlipidemia, Hypertension, Impaired hearing, Internal hemorrhoids, Lymphedema of both lower extremities, Nausea & vomiting, OA (osteoarthritis) of knee, bilateral, Obesity, MARIAMA on CPAP, Osteoarthritis, Osteoarthritis of lumbar spine, S/P revision of total knee, Septicemia due to E. coli ([address] Sick sinus syndrome ([address] Simple chronic bronchitis ([address] Slow transit constipation, Unspecified sleep apnea, and UTI (urinary tract infection). has a past surgical history that includes Finger amputation (1966); Gastric bypass surgery (1985); Carpal tunnel release; Colonoscopy; Rotator cuff repair; joint replacement (2004 & 2005); Hemorrhoid surgery; pr egd transoral biopsy single/multiple (N/A, 05/25/2018); Upper gastrointestinal endoscopy (08/13/2018); Upper gastrointestinal endoscopy (N/A, 08/13/2018); Upper gastrointestinal endoscopy (N/A, 08/13/2018); Upper gastrointestinal endoscopy (08/16/2018); pr egd flexible foreign body removal (N/A, 08/16/2018);  Upper gastrointestinal endoscopy (08/16/2018); Upper gastrointestinal endoscopy (N/A, 10/01/2018); Upper gastrointestinal endoscopy (10/01/2018); Upper gastrointestinal endoscopy (N/A, 11/19/2018); Cystoscopy (01/02/2019); Cystoscopy (N/A, 01/02/2019); Upper gastrointestinal endoscopy (N/A, 10/15/2020); Colonoscopy (N/A, 04/05/2021); Pacemaker insertion (04/09/2021); and Upper gastrointestinal endoscopy (N/A, 7/16/2021). Restrictions  Restrictions/Precautions  Restrictions/Precautions: General Precautions,Isolation,Up as Tolerated  Required Braces or Orthoses?: Yes  Required Braces or Orthoses  Right Lower Extremity Brace: Ankle Foot Orthotics  Left Lower Extremity Brace: Erika Foot Orthotics  Position Activity Restriction  Other position/activity restrictions: NG tube, R UE IV, up with assist, RUE IV    Subjective   General  Chart Reviewed: Yes  Patient assessed for rehabilitation services?: Yes  Family / Caregiver Present: Yes (wife in room)  Subjective  Subjective: \"you can bug me all you want\"  General Comment  Comments: Pt resting in bed, agreeable to OT eval.  Patient Currently in Pain: Yes  Pre Treatment Pain Screening  Comments / Details: Pt reports 3/10 pain in abdomin, RN aware.       Social/Functional History  Social/Functional History  Lives With: Spouse  Type of Home: House  Home Layout: Two level,Able to Live on Main level with bedroom/bathroom,Laundry in basement (full flight to basement with handrails)  Home Access: Stairs to enter with rails (6 steps in front but they are being remodeled)  Entrance Stairs - Number of Steps: 4 + 1 back door  Entrance Stairs - Rails: Both  Bathroom Shower/Tub: Tub/Shower unit  Bathroom Toilet: Handicap height  Bathroom Equipment: Grab bars in shower,Tub transfer bench,Toilet raiser,Grab bars around toilet  Home Equipment: Rolling walker,Cane,4 wheeled walker,Hospital bed (AFO for R drop foot)  Receives Help From: Family  ADL Assistance: Independent  Homemaking Assistance: Needs assistance (daughter does most cooking and cleaning)  Ambulation Assistance: Independent (uses RW)  Transfer Assistance: Independent  Active : No  Patient's  Info: family can drive him  Type of occupation: teaching special education  Leisure & Hobbies: interacting with people  Additional Comments: Pt denies any recent falls       Objective   Vision: Within Functional Limits (Pt denies any recent visual changes)  Vision Exceptions: Wears glasses at all times  Hearing: Exceptions to [facility]  Hearing Exceptions: Bilateral hearing aid    Orientation  Overall Orientation Status: Within Functional Limits  Observation/Palpation  Posture: Fair (with RW)  Observation: resting in bed on bedpan, NG tube, R UE IV  Edema: wearing knee high compression stockings       Balance  Sitting Balance: Stand by assistance  Standing Balance: Contact guard assistance (with RW)  Standing Balance  Time: standing ~ 1-2 min  Activity: functional mobility  Functional Mobility  Functional - Mobility Device: Rolling Walker  Activity:  (bed MCC across room and back (limited by NG line))  Assist Level: Contact guard assistance  Functional Mobility Comments: Pt given Min verbal cues for upright posture, RW safety, scanning environment, pacing self and awareness/assist with multiple lines all to increase safety. ADL  Feeding: Setup  Grooming: Setup;Stand by assistance (seated)  UE Bathing: Setup;Minimal assistance  LE Bathing: Setup; Moderate assistance  UE Dressing: Setup;Minimal assistance (to tie/adjust hosp gown seated on EOB)  LE Dressing: Setup; Moderate assistance (for donning/doffing B AFO/shoes)  Toileting: Dependent/Total (for hygiene after BM on bedpain supine in bed)       Tone RUE  RUE Tone: Normotonic  Tone LUE  LUE Tone: Normotonic  Coordination  Movements Are Fluid And Coordinated: No  Coordination and Movement description: Fine motor impairments; Left UE;Decreased speed;Decreased accuracy; Right UE (slowed/delayed B opposition)        Bed mobility  Rolling to Left: Minimal assistance  Supine to Sit: Moderate assistance;2 Person assistance (patient pulled on therapist instead of pushing up with UEs)  Sit to Supine: Minimal assistance  Comment: Pt required Mod verbal cues for use of bedrails, pacing self, proper bed mobility tech all to increase safety and reduce risk of falls. Transfers  Sit to stand: Contact guard assistance (with RW)  Stand to sit: Contact guard assistance  Transfer Comments: Pt given Min verbal cues for RW safety, controlled stand to sit, pacing self and slowing down movements all to increase safety. Cognition  Overall Cognitive Status: WFL        Sensation  Overall Sensation Status: Impaired (intermittent N/T B hands)        LUE AROM (degrees)  LUE AROM : WFL  RUE AROM (degrees)  RUE AROM : WFL  LUE Strength  Gross LUE Strength: Exceptions to [facility]  LUE Strength Comment: shld 3+/5, 4-/5  RUE Strength  Gross RUE Strength: Exceptions to [facility]  RUE Strength Comment: shld 3+/5, 4-/5           Plan   Plan  Times per week: 4-5x/wk 1x/day as alexia  Current Treatment Recommendations: Strengthening,Endurance Training,Functional Mobility Training,Balance Training,Safety Education & Training,Home Management Training,Self-Care / ADL,Equipment Evaluation, Education, & procurement                        AM-PAC Score        -[facility] Inpatient Daily Activity Raw Score: 15 (01/07/22 1548)  AM-PAC Inpatient ADL T-Scale Score : 34.69 (01/07/22 1548)  ADL Inpatient CMS 0-100% Score: 56.46 (01/07/22 1548)  ADL Inpatient CMS G-Code Modifier : CK (01/07/22 1548)    Goals  Short term goals  Time Frame for Short term goals: By discharge, pt to demo  Short term goal 1: ADL transfers and functional mobility to Mod I with use of AD as needed. Short term goal 2: bed mobility to SBA with use of bedrails as needed.   Short term goal 3: increased B UE strength by 1/2 grade to assist with functional tasks/I with  B UE HEP with use of handouts as needed. Short term goal 4: UB ADLs to Set up and LB ADLs to Min A with use of AD/AE as needed. Short term goal 5: toileting to Min A with use of AD/grab bars as needed. Long term goals  Long term goal 1: Pt to be I with fall prevention education, EC/WS tech, recommendations for AE with use of handouts as needed. Patient Goals   Patient goals : To go home! Therapy Time   Individual Concurrent Group Co-treatment   Time In 1350         Time Out 56         Minutes 43              Co-treatment with PT warranted secondary to decreased safety and independence requiring 2 skilled therapy professionals to address individual discipline's goals.          Ruth Ann Ware, OT

## 2022-01-08 ENCOUNTER — APPOINTMENT (OUTPATIENT)
Dept: GENERAL RADIOLOGY | Age: 77
DRG: 389 | End: 2022-01-08
Payer: MEDICARE

## 2022-01-08 LAB
ANION GAP SERPL CALCULATED.3IONS-SCNC: 7 MMOL/L (ref 9–17)
BUN BLDV-MCNC: 15 MG/DL (ref 8–23)
BUN/CREAT BLD: 19 (ref 9–20)
CALCIUM SERPL-MCNC: 8.7 MG/DL (ref 8.6–10.4)
CHLORIDE BLD-SCNC: 106 MMOL/L (ref 98–107)
CO2: 30 MMOL/L (ref 20–31)
CREAT SERPL-MCNC: 0.78 MG/DL (ref 0.7–1.2)
GFR AFRICAN AMERICAN: >60 ML/MIN
GFR NON-AFRICAN AMERICAN: >60 ML/MIN
GFR SERPL CREATININE-BSD FRML MDRD: ABNORMAL ML/MIN/{1.73_M2}
GFR SERPL CREATININE-BSD FRML MDRD: ABNORMAL ML/MIN/{1.73_M2}
GLUCOSE BLD-MCNC: 124 MG/DL (ref 70–99)
HCT VFR BLD CALC: 32.2 % (ref 40.7–50.3)
HEMOGLOBIN: 10 G/DL (ref 13–17)
INR BLD: 1.4
MCH RBC QN AUTO: 31.9 PG (ref 25.2–33.5)
MCHC RBC AUTO-ENTMCNC: 31.1 G/DL (ref 28.4–34.8)
MCV RBC AUTO: 102.9 FL (ref 82.6–102.9)
NRBC AUTOMATED: 0 PER 100 WBC
PDW BLD-RTO: 13.5 % (ref 11.8–14.4)
PLATELET # BLD: 118 K/UL (ref 138–453)
PMV BLD AUTO: 8.6 FL (ref 8.1–13.5)
POTASSIUM SERPL-SCNC: 4 MMOL/L (ref 3.7–5.3)
PROTHROMBIN TIME: 16.8 SEC (ref 11.5–14.2)
RBC # BLD: 3.13 M/UL (ref 4.21–5.77)
SODIUM BLD-SCNC: 143 MMOL/L (ref 135–144)
WBC # BLD: 4.6 K/UL (ref 3.5–11.3)

## 2022-01-08 PROCEDURE — 6360000002 HC RX W HCPCS: Performed by: INTERNAL MEDICINE

## 2022-01-08 PROCEDURE — 1200000000 HC SEMI PRIVATE

## 2022-01-08 PROCEDURE — 6360000002 HC RX W HCPCS: Performed by: NURSE PRACTITIONER

## 2022-01-08 PROCEDURE — 74019 RADEX ABDOMEN 2 VIEWS: CPT

## 2022-01-08 PROCEDURE — 6370000000 HC RX 637 (ALT 250 FOR IP): Performed by: NURSE PRACTITIONER

## 2022-01-08 PROCEDURE — 6360000002 HC RX W HCPCS: Performed by: SURGERY

## 2022-01-08 PROCEDURE — 2500000003 HC RX 250 WO HCPCS: Performed by: SURGERY

## 2022-01-08 PROCEDURE — 99232 SBSQ HOSP IP/OBS MODERATE 35: CPT | Performed by: INTERNAL MEDICINE

## 2022-01-08 PROCEDURE — 80048 BASIC METABOLIC PNL TOTAL CA: CPT

## 2022-01-08 PROCEDURE — 2500000003 HC RX 250 WO HCPCS: Performed by: NURSE PRACTITIONER

## 2022-01-08 PROCEDURE — 94761 N-INVAS EAR/PLS OXIMETRY MLT: CPT

## 2022-01-08 PROCEDURE — 36415 COLL VENOUS BLD VENIPUNCTURE: CPT

## 2022-01-08 PROCEDURE — 2580000003 HC RX 258: Performed by: SURGERY

## 2022-01-08 PROCEDURE — 85027 COMPLETE CBC AUTOMATED: CPT

## 2022-01-08 PROCEDURE — 85610 PROTHROMBIN TIME: CPT

## 2022-01-08 PROCEDURE — 94640 AIRWAY INHALATION TREATMENT: CPT

## 2022-01-08 PROCEDURE — 2580000003 HC RX 258: Performed by: NURSE PRACTITIONER

## 2022-01-08 RX ORDER — KETOROLAC TROMETHAMINE 30 MG/ML
15 INJECTION, SOLUTION INTRAMUSCULAR; INTRAVENOUS ONCE
Status: COMPLETED | OUTPATIENT
Start: 2022-01-08 | End: 2022-01-08

## 2022-01-08 RX ORDER — POLYVINYL ALCOHOL 14 MG/ML
1 SOLUTION/ DROPS OPHTHALMIC PRN
Status: DISCONTINUED | OUTPATIENT
Start: 2022-01-08 | End: 2022-01-10 | Stop reason: HOSPADM

## 2022-01-08 RX ADMIN — SODIUM CHLORIDE, SODIUM LACTATE, POTASSIUM CHLORIDE, CALCIUM CHLORIDE AND DEXTROSE MONOHYDRATE: 5; 600; 310; 30; 20 INJECTION, SOLUTION INTRAVENOUS at 04:18

## 2022-01-08 RX ADMIN — ROPINIROLE HYDROCHLORIDE 0.25 MG: 0.25 TABLET, FILM COATED ORAL at 21:28

## 2022-01-08 RX ADMIN — BUDESONIDE AND FORMOTEROL FUMARATE DIHYDRATE 2 PUFF: 160; 4.5 AEROSOL RESPIRATORY (INHALATION) at 09:15

## 2022-01-08 RX ADMIN — MORPHINE SULFATE 2 MG: 2 INJECTION, SOLUTION INTRAMUSCULAR; INTRAVENOUS at 09:14

## 2022-01-08 RX ADMIN — KETOROLAC TROMETHAMINE 15 MG: 30 INJECTION, SOLUTION INTRAMUSCULAR at 14:59

## 2022-01-08 RX ADMIN — SODIUM CHLORIDE, SODIUM LACTATE, POTASSIUM CHLORIDE, CALCIUM CHLORIDE AND DEXTROSE MONOHYDRATE: 5; 600; 310; 30; 20 INJECTION, SOLUTION INTRAVENOUS at 13:44

## 2022-01-08 RX ADMIN — BUMETANIDE 1 MG: 0.25 INJECTION INTRAMUSCULAR; INTRAVENOUS at 21:49

## 2022-01-08 RX ADMIN — SODIUM CHLORIDE, PRESERVATIVE FREE 10 ML: 5 INJECTION INTRAVENOUS at 21:47

## 2022-01-08 RX ADMIN — MORPHINE SULFATE 2 MG: 2 INJECTION, SOLUTION INTRAMUSCULAR; INTRAVENOUS at 04:09

## 2022-01-08 RX ADMIN — POLYVINYL ALCOHOL 1 DROP: 14 SOLUTION/ DROPS OPHTHALMIC at 04:34

## 2022-01-08 RX ADMIN — ONDANSETRON 4 MG: 2 INJECTION INTRAMUSCULAR; INTRAVENOUS at 04:10

## 2022-01-08 RX ADMIN — BUDESONIDE AND FORMOTEROL FUMARATE DIHYDRATE 2 PUFF: 160; 4.5 AEROSOL RESPIRATORY (INHALATION) at 19:51

## 2022-01-08 RX ADMIN — FAMOTIDINE 20 MG: 10 INJECTION, SOLUTION INTRAVENOUS at 09:14

## 2022-01-08 RX ADMIN — BUMETANIDE 1 MG: 0.25 INJECTION INTRAMUSCULAR; INTRAVENOUS at 09:14

## 2022-01-08 RX ADMIN — KETOROLAC TROMETHAMINE 15 MG: 30 INJECTION, SOLUTION INTRAMUSCULAR at 21:31

## 2022-01-08 RX ADMIN — FAMOTIDINE 20 MG: 10 INJECTION, SOLUTION INTRAVENOUS at 21:29

## 2022-01-08 ASSESSMENT — PAIN DESCRIPTION - LOCATION: LOCATION: HEAD

## 2022-01-08 ASSESSMENT — PAIN - FUNCTIONAL ASSESSMENT: PAIN_FUNCTIONAL_ASSESSMENT: PREVENTS OR INTERFERES SOME ACTIVE ACTIVITIES AND ADLS

## 2022-01-08 ASSESSMENT — PAIN DESCRIPTION - PAIN TYPE: TYPE: ACUTE PAIN

## 2022-01-08 ASSESSMENT — PAIN DESCRIPTION - DESCRIPTORS: DESCRIPTORS: ACHING

## 2022-01-08 ASSESSMENT — PAIN DESCRIPTION - ORIENTATION: ORIENTATION: UPPER

## 2022-01-08 ASSESSMENT — PAIN SCALES - GENERAL
PAINLEVEL_OUTOF10: 7
PAINLEVEL_OUTOF10: 6

## 2022-01-08 NOTE — PLAN OF CARE
Problem: Pain:  Goal: Pain level will decrease  Description: Pain level will decrease  Outcome: Ongoing  Goal: Control of acute pain  Description: Control of acute pain  Outcome: Ongoing  Goal: Control of chronic pain  Description: Control of chronic pain  Outcome: Ongoing     Problem: Falls - Risk of:  Goal: Will remain free from falls  Description: Will remain free from falls  Outcome: Ongoing  Goal: Absence of physical injury  Description: Absence of physical injury  Outcome: Ongoing     Problem: Falls - Risk of:  Goal: Will remain free from falls  Description: Will remain free from falls  Outcome: Ongoing  Goal: Absence of physical injury  Description: Absence of physical injury  Outcome: Ongoing

## 2022-01-08 NOTE — TELEPHONE ENCOUNTER
Health Maintenance   Topic Date Due    COVID-19 Vaccine (1) Never done    Shingles Vaccine (1 of 2) Never done    Flu vaccine (1) 06/30/2022 (Originally 9/1/2021)    Depression Screen  10/21/2022    Annual Wellness Visit (AWV)  10/22/2022    Potassium monitoring  01/08/2023    Creatinine monitoring  01/08/2023    Lipid screen  04/01/2026    DTaP/Tdap/Td vaccine (2 - Td or Tdap) 04/22/2029    Colon cancer screen colonoscopy  04/05/2031    Pneumococcal 65+ years Vaccine  Completed    AAA screen  Completed    Hepatitis C screen  Completed    Hepatitis A vaccine  Aged Out    Hepatitis B vaccine  Aged Out    Hib vaccine  Aged Out    Meningococcal (ACWY) vaccine  Aged Out             (applicable per patient's age: Cancer Screenings, Depression Screening, Fall Risk Screening, Immunizations)    LDL Cholesterol (mg/dL)   Date Value   04/01/2021 49     LDL Calculated (mg/dL)   Date Value   10/30/2013 67     AST (U/L)   Date Value   01/07/2022 30     ALT (U/L)   Date Value   01/07/2022 12     BUN (mg/dL)   Date Value   01/08/2022 15      (goal A1C is < 7)   (goal LDL is <100) need 30-50% reduction from baseline     BP Readings from Last 3 Encounters:   01/08/22 114/68   11/10/21 114/69   10/21/21 102/65    (goal /80)      All Future Testing planned in [facility]:  Lab Frequency Next Occurrence   Basic Metabolic Panel Once 35/29/7485   Up with assistance PRN    Intake and output EVERY 8 HOURS    Initiate Oxygen Therapy Protocol DAILY (RT)    Pulse Oximetry Spot Check PRN    Initiate RT Inhaler-Nebulizer Bronchodilator Protocol DAILY (RT)        Next Visit Date:  Future Appointments   Date Time Provider Jeri Flynn   2/14/2022  1:30 PM JULIAN Woods - CNP [facility]            Patient Active Problem List:     Chronic atrial fibrillation (HCC)     Dyslipidemia     Gastroesophageal reflux disease without esophagitis     Osteoarthritis of lumbar spine     OA (osteoarthritis) of knee, bilateral     Foot drop, right     Hallux valgus, acquired, bilateral     Hearing impairment     Essential hypertension     Slow transit constipation     MARIAMA on CPAP     Simple chronic bronchitis (HCC)     Non-intractable vomiting     Hospital-acquired bacterial pneumonia     Pneumonia due to organism     COPD (chronic obstructive pulmonary disease) (HCC)     Chronic diastolic heart failure (HCC)     History of bariatric surgery including banding leading to esophageal stricture and aspiration     BPH (benign prostatic hyperplasia)     Myalgia     Atrial fibrillation with slow ventricular response (HCC)     Pacemaker pocket hematoma, initial encounter     Pneumonia-community-acquired     Acute hypoxemic respiratory failure (HCC)     SIRS (systemic inflammatory response syndrome) ([address]     Pulmonary hypertension (HCC)     Presence of permanent cardiac pacemaker     Aspiration pneumonia (HCC)     SBO (small bowel obstruction) ([address]

## 2022-01-08 NOTE — CONSULTS
[facility] Cardiology   Consult Note             Date:   1/8/2022  Patient name: Bronwyn Winn  Date of admission:  1/7/2022  4:49 AM  MRN:   [id number]  YOB: 1945    Reason for Admission: abdominal pain    CHIEF COMPLAINT:  same     History Obtained From:  patient, electronic medical record    HISTORY OF PRESENT ILLNESS:    Bronwyn Winn is a 77 y.o. male who presents with right lower quadrant abdominal pain and nausea and vomiting for the past 2 days. States he did have a bowel movement yesterday but has not had a bowel movement or passed gas since this occurred. Cardiology was consulted because patient has atrial fibrillation and pacemaker implantation    Past Medical History:   has a past medical history of Acute superficial gastritis without hemorrhage, Anastomotic stricture of stomach, Asthma, Atrial fibrillation ([address] Calculus of gallbladder without cholecystitis without obstruction, Chronic diastolic heart failure ([address] Chronic rhinosinusitis, Class 2 severe obesity due to excess calories with serious comorbidity and body mass index (BMI) of 36.0 to 36.9 in [facility]), COPD (chronic obstructive pulmonary disease) ([address] E. coli septicemia ([address] E. coli UTI, Foot drop, right, Fracture of femur ([address] Gait disorder, Gastric out let obstruction, GERD (gastroesophageal reflux disease), Hallux valgus, acquired, bilateral, Hyperlipidemia, Hypertension, Impaired hearing, Internal hemorrhoids, Lymphedema of both lower extremities, Nausea & vomiting, OA (osteoarthritis) of knee, bilateral, Obesity, MARIAMA on CPAP, Osteoarthritis, Osteoarthritis of lumbar spine, S/P revision of total knee, Septicemia due to E. coli ([address] Sick sinus syndrome ([address] Simple chronic bronchitis ([address] Slow transit constipation, Unspecified sleep apnea, and UTI (urinary tract infection). Past Surgical History:   has a past surgical history that includes Finger amputation (1966);  Gastric bypass surgery (1985); Carpal tunnel release; Colonoscopy; Rotator cuff repair; joint replacement (2004 & 2005); Hemorrhoid surgery; pr egd transoral biopsy single/multiple (N/A, 05/25/2018); Upper gastrointestinal endoscopy (08/13/2018); Upper gastrointestinal endoscopy (N/A, 08/13/2018); Upper gastrointestinal endoscopy (N/A, 08/13/2018); Upper gastrointestinal endoscopy (08/16/2018); pr egd flexible foreign body removal (N/A, 08/16/2018); Upper gastrointestinal endoscopy (08/16/2018); Upper gastrointestinal endoscopy (N/A, 10/01/2018); Upper gastrointestinal endoscopy (10/01/2018); Upper gastrointestinal endoscopy (N/A, 11/19/2018); Cystoscopy (01/02/2019); Cystoscopy (N/A, 01/02/2019); Upper gastrointestinal endoscopy (N/A, 10/15/2020); Colonoscopy (N/A, 04/05/2021); Pacemaker insertion (04/09/2021); and Upper gastrointestinal endoscopy (N/A, 7/16/2021). Home Medications:    Prior to Admission medications    Medication Sig Start Date End Date Taking?  Authorizing Provider   fluticasone (FLONASE) 50 MCG/ACT nasal spray 2 sprays by Nasal route daily 12/29/21   JULIAN Salazar CNP   Multiple Vitamin (MULTI-VITAMINS) TABS TAKE 1 TABLET BY MOUTH DAILY 12/10/21   JULIAN Salazar CNP   potassium chloride (KLOR-CON M) 20 MEQ extended release tablet TAKE 1 TABLET BY MOUTH TWICE DAILY 12/10/21   JULIAN Salazar CNP   docusate sodium (COLACE) 100 MG capsule TAKE 1 CAPSULE BY MOUTH TWICE DAILY AS NEEDED FOR CONSTIPATION 12/10/21   JULIAN Salazar CNP   tiZANidine (ZANAFLEX) 4 MG tablet TAKE 1 TABLET BY MOUTH DAILY AS NEEDED 12/10/21   JULIAN Salazar CNP   rOPINIRole (REQUIP) 0.25 MG tablet TAKE 1 TABLET BY MOUTH TWICE DAILY 11/15/21   JULIAN Salazar CNP   cetirizine (ZYRTEC) 10 MG tablet TAKE 1 TABLET BY MOUTH DAILY 11/15/21   JULIAN Salazar CNP   bumetanide (BUMEX) 1 MG tablet TAKE 2 TABLETS BY MOUTH EVERY MORNING AND TAKE 1 TABLET BY MOUTH IN THE AFTERNOON AND 1 TABLET EVERY EVENING IF WEIGHT IS >3 ABOVE BASELINE 11/15/21   JULIAN Mccann CNP   pantoprazole (PROTONIX) 40 MG tablet TAKE 1 TABLET BY MOUTH DAILY 11/15/21   JULIAN Mccann CNP   gabapentin (NEURONTIN) 300 MG capsule Take 1 capsule by mouth 3 times daily for 28 days.  11/10/21 12/8/21  JULIAN Mccann CNP   tamsulosin [facility]) 0.4 MG capsule Take 1 capsule by mouth daily 10/19/21   JULIAN Mccann CNP   CREON 29827-66677 units delayed release capsule TAKE 1 CAPSULE BY MOUTH THREE TIMES DAILY WITH MEALS 9/20/21   JULIAN Mccann CNP   metoprolol succinate (TOPROL XL) 50 MG extended release tablet TAKE 1 TABLET BY MOUTH AT BEDTIME 9/20/21   JULIAN Mccann CNP   rivaroxaban (XARELTO) 20 MG TABS tablet TAKE 1 TABLET BY MOUTH DAILY 8/27/21   JULIAN Mccann CNP   Fluticasone furoate-vilanterol (BREO ELLIPTA) 200-25 MCG/INH AEPB inhaler INHALE 1 PUFF INTO THE LUNGS DAILY **RINSE MOUTH AFTER EACH USE**    Historical Provider, MD   Nutritional Supplements (BOOST) LIQD 1 can twice a day for meals 7/28/21   JULIAN Mccann CNP   loratadine (CLARITIN) 10 MG tablet Take 1 tablet by mouth daily 4/27/21   JULIAN Mccann CNP   albuterol sulfate HFA (VENTOLIN HFA) 108 (90 Base) MCG/ACT inhaler Inhale 2 puffs into the lungs every 6 hours as needed for Wheezing or Shortness of Breath    Historical Provider, MD   benzonatate (TESSALON) 100 MG capsule TAKE 1 CAPSULE BY MOUTH THREE TIMES DAILY AS NEEDED FOR COUGH 3/31/21   Micheline Yip PA-C   vitamin B-12 (CYANOCOBALAMIN) 100 MCG tablet TAKE 1 TABLET BY MOUTH DAILY AS NEEDED FOR FATIGUE 3/27/21   Micheline Yip PA-C   carboxymethylcellulose (REFRESH PLUS) 0.5 % SOLN ophthalmic solution Apply 1 drop to eye 4 times daily    Historical Provider, MD   Menthol-Methyl Salicylate (THERA-GESIC) 0.5-15 % CREA Apply topically 2 times daily as needed 6/26/20   Historical Provider, MD   ascorbic acid (VITAMIN C) 500 MG tablet Take 1 tablet by mouth daily 10/26/20   Felicie ADELINE Amin   ferrous sulfate (FE TABS 325) 325 (65 Fe) MG EC tablet Take 1 tablet by mouth 2 times daily (with meals) 10/16/20   Elizabeth Perez PA-C   SM LUBRICANT EYE DROPS 0.4-0.3 % ophthalmic solution PLACE 1 DROP INTO BOTH EYES FOUR TIMES DAILY AS NEEDED FOR DRY EYES 10/17/19   Obedicie ADELINE Amin   Handicap Placard MISC by Does not apply route 6/27/19   Obedicie ADELINE Amin   Incontinence Supply Disposable (INCONTINENCE BRIEF LARGE) MISC To use as directed. Use 3x a day 4/30/19   Ajaye ADELINE Amin   Foot Care Products (TRI-BALANCE ORTHOTICS MENS) MISC Provide insurance covered orthotics shoes, wear daily 12/12/18   Ajaye ADELINE Amin   ipratropium-albuterol (DUONEB) 0.5-2.5 (3) MG/3ML SOLN nebulizer solution Inhale 1 vial into the lungs every 4 hours as needed     Historical Provider, MD   Armodafinil 200 MG TABS Take 1 tablet by mouth 2 times daily. 4/2/18   Historical Provider, MD       Allergies:  Darvocet [propoxyphene n-acetaminophen], Other, Oxycodone-acetaminophen, and Propoxyphene    Social History:   reports that he quit smoking about 45 years ago. He has a 20.00 pack-year smoking history. He has never used smokeless tobacco. He reports that he does not drink alcohol and does not use drugs. Family History: family history includes Cancer in his mother; Heart Attack in his father. REVIEW OF SYSTEMS:    · Constitutional: there has been no unanticipated weight loss. There's been No change in energy level, Yes change in activity level. · Eyes: No visual changes or diplopia. No scleral icterus. · ENT: No Headaches, hearing loss or vertigo. No mouth sores or sore throat. · Cardiovascular: No chest pain, No dyspnea on exertion, No palpitations or No loss of consciousness. No cough, hemoptysis, No pleuritic pain, or phlebitis. · Respiratory: No cough or wheezing, no sputum production. No hematemesis.     · Gastrointestinal: abdominal pain as stated above  · Genitourinary: No dysuria, trouble voiding, or hematuria. · Musculoskeletal:  No gait disturbance, No weakness or joint complaints. · Integumentary: No rash or pruritis. · Neurological: No headache, diplopia, change in muscle strength, numbness or tingling. No change in gait, balance, coordination, mood, affect, memory, mentation, behavior. · Psychiatric: No anxiety, or depression. · Endocrine: No temperature intolerance. No excessive thirst, fluid intake, or urination. No tremor. · Hematologic/Lymphatic: No abnormal bruising or bleeding, blood clots or swollen lymph nodes. · Allergic/Immunologic: No nasal congestion or hives. PHYSICAL EXAM:    Physical Examination:    /63   Pulse 70   Temp 98.4 °F (36.9 °C) (Oral)   Resp 16   Ht 5' 10\" (1.778 m)   Wt 191 lb (86.6 kg)   SpO2 99%   BMI 27.41 kg/m²    Constitutional and General Appearance: alert, cooperative, no distress and appears stated age  HEENT: PERRL, no cervical lymphadenopathy. No masses palpable. Normal oral mucosa  Respiratory:  · Normal excursion and expansion without use of accessory muscles  · Resp Auscultation: Good respiratory effort. No for increased work of breathing. On auscultation: clear to auscultation bilaterally  Cardiovascular:  · The apical impulse is not displaced  · Heart tones are crisp and normal. regular S1 and S2.  · Jugular venous pulsation Normal  · The carotid upstroke is normal in amplitude and contour without delay or bruit  · Peripheral pulses are symmetrical and full   Abdomen:  · No masses or tenderness  · Bowel sounds present  Extremities:  ·  No Cyanosis or Clubbing  ·  Lower extremity edema: No  ·  Skin: Warm and dry  Neurological:  · Alert and oriented. · Moves all extremities well  · No abnormalities of mood, affect, memory, mentation, or behavior are noted    DATA:    Diagnostics:      EKG: atrial fibrillation with ventricular pacing.     Labs:     CBC:   Recent Labs RECOMMENDATIONS:  1. Atrial fibrillation  2. Sp pacemaker implant  3. Small bowel obstruction  Patient has pacemaker which stable with no issues. Continue supportive therapy  Resume anticoagulation for stroke prophylaxis if no surgery is planned. Discussed with patient and nursing.     Annalise Serrano MD, MD  [facility] cardiology

## 2022-01-08 NOTE — PROGRESS NOTES
ordered home dose of xaltero to be started when okay with surgery   Writer reached out to  to see if okay to start  Do not start until patient is taking PO, updated Pharmacy

## 2022-01-08 NOTE — PROGRESS NOTES
ABD xray 1000 1/8/2021  Impression   Nonspecific bowel gas pattern without evidence for obstruction.       Gastric tube with the tip in the mid gastric body.       updated of above results

## 2022-01-08 NOTE — PROGRESS NOTES
[facility]  Office: [address], DO, Tamara March, DO, Sheng Carson, DO, Rocío Octavia Burch, DO, Gema Dowell MD, Matthew Sears MD, Deanna العلي MD, Tejas Pedro MD, Di Dobbs MD, Antonio Ge MD, Yuri Jalloh MD, Nancy Wahl, DO, Jerilyn Viramontes, DO, Sacha Arteaga MD,  Arlene Cortes, DO, Vani Staton MD, Mirian Weber MD, Alicia Perez MD, Mabel Sheriff MD, Beverley Gee MD, Lesley White MD, Morena Snow MD, Donis Henriquez, [facility], [facility], CNP, Win Cho, CNP, [facility], CNS, Baldemar Robledo, CNP, Fazal Shields, CNP, Cyn Ortiz, CNP, Phani Fabian, CNP, Denise Shea CNP, Umair Groves PA-C, Austin Pratt DNP, Claudette Anis, DNP, Efrain Beavers CNP, Hany Douglas, CNP, [facility], CNP, Aydee Acosta CNP, Ivy Ji [facility], Hua Bhatti [facility]    Progress Note    1/8/2022    1:55 PM    Name:   Morenita Reynolds  MRN:     [id number]     Acct:      [de-identified]   Room:   2014/2014-02  IP Day:  1  Admit Date:  1/7/2022  4:49 AM    PCP:   JULIAN Garcia CNP  Code Status:  Full Code    Subjective:     C/C:   Chief Complaint   Patient presents with   Harris Abdominal Pain     RLQ    Emesis     x2 days     Interval History Status:   States he had 3 bowel movements overnight, one was larger  , Has passed small amount of flatus,  Denies pain abdomen today      Brief History:   Morenita Reynolds is a 77 y.o. Non- / non  male who presents with Abdominal Pain (RLQ) and Emesis (x2 days)   and is admitted to the hospital for the management of SBO (small bowel obstruction) ([facility]    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 Patient reports she has dealt with these for many years.   Patient reports that approximately 36 hours ago he developed right lower quadrant pain followed by severe nausea. He reported that this resolved and improved temporarily and then returned approximately 12 hours ago significantly worse. Patient reports that his pain was at a 9/10 and was a cramping pain in nature. Patient was quickly followed by severe nausea and intractable vomiting. Patient reported to the emergency department where he was found to have findings consistent with small bowel obstruction. Patient reports that since this morning he has been incontinent of stool. Patient is scheduled to have a evaluation at a tertiary center in [facility] with their gastroenterology team due to his recurrent abdominal problems. ER did consult general surgery at this facility and they are comfortable managing case care. Patient will be managed medically for the time being      Review of Systems:     Constitutional:  negative for chills, fevers, sweats  Respiratory:  negative for cough, dyspnea on exertion, shortness of breath, wheezing  Cardiovascular:  negative for chest pain, chest pressure/discomfort, lower extremity edema, palpitations  Gastrointestinal:  negative for abdominal pain, constipation, diarrhea, nausea, vomiting  Neurological:  negative for dizziness, headache    Medications: Allergies:     Allergies   Allergen Reactions    Darvocet [Propoxyphene N-Acetaminophen] Hives    Other Hives    Oxycodone-Acetaminophen     Propoxyphene      Other reaction(s): Unknown       Current Meds:   Scheduled Meds:    bumetanide  1 mg IntraVENous BID    lipase-protease-amylase  24,000 Units Oral TID WC    budesonide-formoterol  2 puff Inhalation BID    fluticasone  2 spray Nasal Daily    metoprolol succinate  50 mg Oral Daily    rOPINIRole  0.25 mg Oral BID    tamsulosin  0.4 mg Oral Daily    sodium chloride flush  5-40 mL IntraVENous 2 times per day    famotidine (PEPCID) injection  20 mg IntraVENous BID     Continuous Infusions:    sodium chloride      dextrose 5% in lactated ringers 125 mL/hr at 01/08/22 1344     PRN Meds: polyvinyl alcohol, sodium chloride flush, albuterol sulfate HFA, benzonatate, tiZANidine, sodium chloride flush, sodium chloride, potassium chloride **OR** potassium alternative oral replacement **OR** potassium chloride, magnesium sulfate, ondansetron **OR** ondansetron, polyethylene glycol, morphine    Data:     Past Medical History:   has a past medical history of Acute superficial gastritis without hemorrhage, Anastomotic stricture of stomach, Asthma, Atrial fibrillation ([facility] Calculus of gallbladder without cholecystitis without obstruction, Chronic diastolic heart failure ([facility] Chronic rhinosinusitis, Class 2 severe obesity due to excess calories with serious comorbidity and body mass index (BMI) of 36.0 to 36.9 in [facility]), COPD (chronic obstructive pulmonary disease) ([facility] E. coli septicemia ([facility] E. coli UTI, Foot drop, right, Fracture of femur ([facility] Gait disorder, Gastric out let obstruction, GERD (gastroesophageal reflux disease), Hallux valgus, acquired, bilateral, Hyperlipidemia, Hypertension, Impaired hearing, Internal hemorrhoids, Lymphedema of both lower extremities, Nausea & vomiting, OA (osteoarthritis) of knee, bilateral, Obesity, MARIAMA on CPAP, Osteoarthritis, Osteoarthritis of lumbar spine, S/P revision of total knee, Septicemia due to E. coli ([facility] Sick sinus syndrome ([facility] Simple chronic bronchitis ([facility] Slow transit constipation, Unspecified sleep apnea, and UTI (urinary tract infection). Social History:   reports that he quit smoking about 45 years ago. He has a 20.00 pack-year smoking history. He has never used smokeless tobacco. He reports that he does not drink alcohol and does not use drugs.      Family History:   Family History   Problem Relation Age of Onset   Ijeoma Lowery Cancer Mother     Heart Attack Father        Vitals:  /68   Pulse 70   Temp 98.2 °F (36.8 °C) (Oral)   Resp 18   Ht 5' 10\" (1.778 m)   Wt 191 lb (86.6 kg)   SpO2 99%   BMI 27.41 kg/m²   Temp (24hrs), Av °F (36.7 °C), Min:97.5 °F (36.4 °C), Max:98.6 °F (37 °C)    No results for input(s): POCGLU in the last 72 hours. I/O (24Hr): Intake/Output Summary (Last 24 hours) at 2022 1355  Last data filed at 2022 1345  Gross per 24 hour   Intake 2099.22 ml   Output 2525 ml   Net -425.78 ml       Labs:  Hematology:  Recent Labs     225 22  0647   WBC 9.5 4.6   RBC 3.60* 3.13*   HGB 11.5* 10.0*   HCT 36.3* 32.2*   .8 102.9   MCH 31.9 31.9   MCHC 31.7 31.1   RDW 13.2 13.5    118*   MPV 8.8 8.6   INR  --  1.4     Chemistry:  Recent Labs     22  0747 22  0647     --  143   K 4.2  --  4.0   CL 96*  --  106   CO2 28  --  30   GLUCOSE 123*  --  124*   BUN 20  --  15   CREATININE 0.96  --  0.78   ANIONGAP 12  --  7*   LABGLOM >60  --  >60   GFRAA >60  --  >60   CALCIUM 9.7  --  8.7   PROBNP 5,319*  --   --    TROPHS 43* 42*  --      Recent Labs     22   PROT 7.3   LABALBU 4.3   AST 30   ALT 12   ALKPHOS 120   BILITOT 0.73   LIPASE 63*     ABG:  Lab Results   Component Value Date    FIO2 NOT REPORTED 2021     Lab Results   Component Value Date/Time    SPECIAL NOT REPORTED 10/21/2021 10:46 PM     Lab Results   Component Value Date/Time    CULTURE NO SIGNIFICANT GROWTH 10/21/2021 10:46 PM       Radiology:  CT ABDOMEN PELVIS W IV CONTRAST Additional Contrast? None    Result Date: 2022  Findings of small-bowel obstruction with suspected transition point in the lower anterior abdomen on the right. There is evidence of prior gastric bypass surgery. Small amount of reactive free fluid is seen in the pelvis Cholelithiasis.   No cholecystitis Hyperdense nodule projecting posteriorly off the right kidney not clearly a simple cyst.  When the patient's acute symptoms have resolved, recommend follow-up abdominal CT or MRI with and without IV contrast, renal protocol to evaluate for any solid internal enhancement RECOMMENDATIONS: Unavailable     XR CHEST PORTABLE    Result Date: 1/7/2022  1. Similar prominent appearance of the cardiac silhouette allowing for differences in technique. 2. No focal consolidation or overt pulmonary edema. XR ABDOMEN FOR NG/OG/NE TUBE PLACEMENT    Result Date: 1/7/2022  1. Gastric tube tip terminates in the region of the gastroesophageal junction. Recommend further advancement. 2. Multiple dilated loops of small bowel in the visualized abdomen likely correlating with findings on prior CT. XR ABDOMEN (2 VIEWS)    Result Date: 1/8/2022  Nonspecific bowel gas pattern without evidence for obstruction. Gastric tube with the tip in the mid gastric body. Physical Examination:        General appearance:  alert, cooperative and no distress  Mental Status:  oriented to person, place and time and normal affect  Lungs:  clear to auscultation bilaterally, normal effort  Heart: Irregular rhythm, no murmur,+ pacemaker  Abdomen:  soft, nontender, nondistended, normal bowel sounds, no masses, hepatomegaly, splenomegaly  Extremities:  no edema, redness, tenderness in the calves  Skin:  no gross lesions, rashes, induration    Assessment:        Hospital Problems           Last Modified POA    * (Principal) SBO (small bowel obstruction) ([address] 1/7/2022 Yes    Chronic atrial fibrillation ([address] 1/7/2022 Yes    Dyslipidemia 1/7/2022 Yes    Gastroesophageal reflux disease without esophagitis 1/7/2022 Yes    Essential hypertension 1/7/2022 Yes    Slow transit constipation 1/7/2022 Yes    COPD (chronic obstructive pulmonary disease) ([address] 1/7/2022 Yes    Chronic diastolic heart failure ([address] 1/7/2022 Yes    BPH (benign prostatic hyperplasia) 1/7/2022 Yes          Plan:        1. Repeat x-ray abdomen as recommended by surgery  2. Toradol x1 for headache, states morphine not helping  3.  Clamping NG tube as per surgery recommendations    Mariposa Landaverde MD  1/8/2022  1:55 PM

## 2022-01-08 NOTE — PROGRESS NOTES
General Surgery Progress Note    Subjective:     Patient without complaints. No nausea or vomiting. Had 2 bowel movements yesterday and passing some flatus. Notes that abdominal pain is markedly improved. Scheduled Meds:   bumetanide  1 mg IntraVENous BID    lipase-protease-amylase  24,000 Units Oral TID     budesonide-formoterol  2 puff Inhalation BID    fluticasone  2 spray Nasal Daily    metoprolol succinate  50 mg Oral Daily    rOPINIRole  0.25 mg Oral BID    tamsulosin  0.4 mg Oral Daily    sodium chloride flush  5-40 mL IntraVENous 2 times per day    famotidine (PEPCID) injection  20 mg IntraVENous BID     Continuous Infusions:   sodium chloride      dextrose 5% in lactated ringers 125 mL/hr at 01/08/22 0630     PRN Meds:polyvinyl alcohol, sodium chloride flush, albuterol sulfate HFA, benzonatate, tiZANidine, sodium chloride flush, sodium chloride, potassium chloride **OR** potassium alternative oral replacement **OR** potassium chloride, magnesium sulfate, ondansetron **OR** ondansetron, polyethylene glycol, morphine    Objective:   /67   Pulse 70   Temp 97.5 °F (36.4 °C) (Oral)   Resp 16   Ht 5' 10\" (1.778 m)   Wt 191 lb (86.6 kg)   SpO2 97%   BMI 27.41 kg/m²     I/O last 3 completed shifts: In: 2099.2 [I.V.:2099.2]  Out: 2100 [Urine:1400; Emesis/NG output:700]    Chest: Breath sounds were clear and equal with no rales, wheezes, or rhonchi. Respiratory effort was normal with no retractions or use of accessory muscles. Cardiovascular: Heart sounds were normal with a regular rate and rhythm. There were no murmurs or gallops. Abdomen: Bowel sounds were normal.  The abdomen was soft and non distended. No rebound or guarding.     LABS:  Lab Results   Component Value Date    WBC 4.6 01/08/2022    RBC 3.13 01/08/2022    RBC 4.39 09/13/2011    HGB 10.0 01/08/2022    HCT 32.2 01/08/2022    .9 01/08/2022    MCH 31.9 01/08/2022    MCHC 31.1 01/08/2022    RDW 13.5 01/08/2022     01/08/2022     09/13/2011    MPV 8.6 01/08/2022       Lab Results   Component Value Date     01/08/2022    K 4.0 01/08/2022     01/08/2022    CO2 30 01/08/2022    BUN 15 01/08/2022    CREATININE 0.78 01/08/2022    GLUCOSE 124 01/08/2022    GLUCOSE 99 09/13/2011    CALCIUM 8.7 01/08/2022       Assessment/Plan:   1. Abdominal pain with concern for small bowel obstruction after vertical banded gastroplasty via midline incision  2. Improving clinically and will recheck abdominal x-ray today. NG tube advanced and has clear return.   Will monitor closely    Electronically signed by Simon Meléndez DO  on 1/8/2022 at 8:10 AM

## 2022-01-08 NOTE — PROGRESS NOTES
Covering for Dr Arevalo Person tells me he had left Dr Maria E Sevilla and now has his cardiac care provided by Dr Russel Delgado. Will leave cardiac care to [facility] cardiology. Nurse is aware.

## 2022-01-09 PROCEDURE — 2580000003 HC RX 258: Performed by: NURSE PRACTITIONER

## 2022-01-09 PROCEDURE — 6370000000 HC RX 637 (ALT 250 FOR IP): Performed by: NURSE PRACTITIONER

## 2022-01-09 PROCEDURE — 6370000000 HC RX 637 (ALT 250 FOR IP): Performed by: INTERNAL MEDICINE

## 2022-01-09 PROCEDURE — 2500000003 HC RX 250 WO HCPCS: Performed by: SURGERY

## 2022-01-09 PROCEDURE — 94761 N-INVAS EAR/PLS OXIMETRY MLT: CPT

## 2022-01-09 PROCEDURE — 6360000002 HC RX W HCPCS: Performed by: NURSE PRACTITIONER

## 2022-01-09 PROCEDURE — 94640 AIRWAY INHALATION TREATMENT: CPT

## 2022-01-09 PROCEDURE — 1200000000 HC SEMI PRIVATE

## 2022-01-09 PROCEDURE — 2580000003 HC RX 258: Performed by: INTERNAL MEDICINE

## 2022-01-09 PROCEDURE — 2500000003 HC RX 250 WO HCPCS: Performed by: NURSE PRACTITIONER

## 2022-01-09 PROCEDURE — 99232 SBSQ HOSP IP/OBS MODERATE 35: CPT | Performed by: INTERNAL MEDICINE

## 2022-01-09 PROCEDURE — 6360000002 HC RX W HCPCS: Performed by: SURGERY

## 2022-01-09 RX ORDER — ACETAMINOPHEN 325 MG/1
650 TABLET ORAL EVERY 6 HOURS PRN
Status: DISCONTINUED | OUTPATIENT
Start: 2022-01-09 | End: 2022-01-10 | Stop reason: HOSPADM

## 2022-01-09 RX ADMIN — BUMETANIDE 1 MG: 0.25 INJECTION INTRAMUSCULAR; INTRAVENOUS at 08:16

## 2022-01-09 RX ADMIN — DICLOFENAC SODIUM 2 G: 10 GEL TOPICAL at 21:50

## 2022-01-09 RX ADMIN — MORPHINE SULFATE 2 MG: 2 INJECTION, SOLUTION INTRAMUSCULAR; INTRAVENOUS at 03:00

## 2022-01-09 RX ADMIN — RIVAROXABAN 20 MG: 20 TABLET, FILM COATED ORAL at 18:41

## 2022-01-09 RX ADMIN — FAMOTIDINE 20 MG: 10 INJECTION, SOLUTION INTRAVENOUS at 08:16

## 2022-01-09 RX ADMIN — METOPROLOL SUCCINATE 50 MG: 50 TABLET, EXTENDED RELEASE ORAL at 13:15

## 2022-01-09 RX ADMIN — FAMOTIDINE 20 MG: 10 INJECTION, SOLUTION INTRAVENOUS at 20:47

## 2022-01-09 RX ADMIN — TAMSULOSIN HYDROCHLORIDE 0.4 MG: 0.4 CAPSULE ORAL at 13:15

## 2022-01-09 RX ADMIN — ACETAMINOPHEN 650 MG: 325 TABLET ORAL at 20:43

## 2022-01-09 RX ADMIN — BUDESONIDE AND FORMOTEROL FUMARATE DIHYDRATE 2 PUFF: 160; 4.5 AEROSOL RESPIRATORY (INHALATION) at 08:24

## 2022-01-09 RX ADMIN — SODIUM CHLORIDE, SODIUM LACTATE, POTASSIUM CHLORIDE, CALCIUM CHLORIDE AND DEXTROSE MONOHYDRATE: 5; 600; 310; 30; 20 INJECTION, SOLUTION INTRAVENOUS at 01:00

## 2022-01-09 RX ADMIN — PANCRELIPASE 24000 UNITS: 24000; 76000; 120000 CAPSULE, DELAYED RELEASE PELLETS ORAL at 18:41

## 2022-01-09 RX ADMIN — ROPINIROLE HYDROCHLORIDE 0.25 MG: 0.25 TABLET, FILM COATED ORAL at 13:15

## 2022-01-09 RX ADMIN — ROPINIROLE HYDROCHLORIDE 0.25 MG: 0.25 TABLET, FILM COATED ORAL at 20:43

## 2022-01-09 RX ADMIN — ONDANSETRON 4 MG: 2 INJECTION INTRAMUSCULAR; INTRAVENOUS at 02:57

## 2022-01-09 RX ADMIN — BUDESONIDE AND FORMOTEROL FUMARATE DIHYDRATE 2 PUFF: 160; 4.5 AEROSOL RESPIRATORY (INHALATION) at 22:08

## 2022-01-09 RX ADMIN — POLYVINYL ALCOHOL 1 DROP: 14 SOLUTION/ DROPS OPHTHALMIC at 20:45

## 2022-01-09 RX ADMIN — SODIUM CHLORIDE, PRESERVATIVE FREE 10 ML: 5 INJECTION INTRAVENOUS at 20:48

## 2022-01-09 ASSESSMENT — PAIN DESCRIPTION - PAIN TYPE
TYPE: ACUTE PAIN
TYPE: ACUTE PAIN

## 2022-01-09 ASSESSMENT — PAIN DESCRIPTION - PROGRESSION
CLINICAL_PROGRESSION: GRADUALLY WORSENING
CLINICAL_PROGRESSION: GRADUALLY WORSENING

## 2022-01-09 ASSESSMENT — PAIN DESCRIPTION - FREQUENCY
FREQUENCY: CONTINUOUS
FREQUENCY: CONTINUOUS

## 2022-01-09 ASSESSMENT — PAIN DESCRIPTION - LOCATION
LOCATION: HEAD
LOCATION: HEAD;NECK

## 2022-01-09 ASSESSMENT — PAIN SCALES - GENERAL
PAINLEVEL_OUTOF10: 0
PAINLEVEL_OUTOF10: 3
PAINLEVEL_OUTOF10: 9

## 2022-01-09 ASSESSMENT — PAIN - FUNCTIONAL ASSESSMENT: PAIN_FUNCTIONAL_ASSESSMENT: PREVENTS OR INTERFERES SOME ACTIVE ACTIVITIES AND ADLS

## 2022-01-09 ASSESSMENT — PAIN DESCRIPTION - DESCRIPTORS
DESCRIPTORS: HEADACHE
DESCRIPTORS: ACHING

## 2022-01-09 ASSESSMENT — PAIN DESCRIPTION - ONSET
ONSET: ON-GOING
ONSET: ON-GOING

## 2022-01-09 NOTE — PROGRESS NOTES
[facility]   Section of Cardiology  Progress Note      Date:  1/9/2022  Patient: Priya Mesa  Admission:  1/7/2022  4:49 AM  Admit DX: SBO (small bowel obstruction) ([facility] [H58.989]  Age:  77 y. o., 1945                           LOS: 2 days     Reason for evaluation:   atrial fibrillation  Sp pacemaker implant. SUBJECTIVE:     The patient was seen and examined. Notes and labs reviewed. There were not complications over night. Patient's cardiac review of systems: negative. He is improved and passing flatus and had a bm. NG tube was removed by the surgical team.    OBJECTIVE:    /65   Pulse 70   Temp 98.8 °F (37.1 °C) (Oral)   Resp 18   Ht 5' 10\" (1.778 m)   Wt 198 lb 8 oz (90 kg)   SpO2 95%   BMI 28.48 kg/m²     Intake/Output Summary (Last 24 hours) at 1/9/2022 0931  Last data filed at 1/9/2022 0504  Gross per 24 hour   Intake 2123.37 ml   Output 1325 ml   Net 798.37 ml       EXAM:   CONSTITUTIONAL:  awake, alert, cooperative, no apparent distress, and appears stated age. HEENT: Normal jugular venous pulsations, no carotid bruits. Head is atraumatic, normocephalic. Eyes and oral mucosa are normal.  LUNGS: Good respiratory effort. No for increased work of breathing. On auscultation: clear to auscultation bilaterally  CARDIOVASCULAR:  Normal apical impulse, regular rate and rhythm, normal S1 and S2, no S3 or S4, and no murmur or rub noted. ABDOMEN: Soft, nontender, nondistended. Bowel sounds present. No masses or tenderness. SKIN: Warm and dry. EXTREMITIES: No lower extremity edema. Motor movement grossly intact. No cyanosis or clubbing.     Current Inpatient Medications:   rivaroxaban  20 mg Oral Daily    bumetanide  1 mg IntraVENous BID    lipase-protease-amylase  24,000 Units Oral TID     budesonide-formoterol  2 puff Inhalation BID    fluticasone  2 spray Nasal Daily    metoprolol succinate  50 mg Oral Daily    rOPINIRole  0.25 mg Oral BID    tamsulosin  0.4 mg Oral Daily    sodium chloride flush  5-40 mL IntraVENous 2 times per day    famotidine (PEPCID) injection  20 mg IntraVENous BID       IV Infusions (if any):   sodium chloride      dextrose 5% in lactated ringers 100 mL/hr at 01/09/22 0504       Diagnostics:   Telemetry: ventricular pacing. Labs:   CBC:   Recent Labs     01/07/22 0455 01/08/22  0647   WBC 9.5 4.6   HGB 11.5* 10.0*   HCT 36.3* 32.2*    118*     BMP:   Recent Labs     01/07/22  0455 01/08/22  0647    143   K 4.2 4.0   CO2 28 30   BUN 20 15   CREATININE 0.96 0.78   LABGLOM >60 >60   GLUCOSE 123* 124*     BNP: No results for input(s): BNP in the last 72 hours. PT/INR:   Recent Labs     01/08/22 0647   PROTIME 16.8*   INR 1.4     APTT:No results for input(s): APTT in the last 72 hours. CARDIAC ENZYMES:No results for input(s): CKTOTAL, CKMB, CKMBINDEX, TROPONINI in the last 72 hours.   FASTING LIPID PANEL:  Lab Results   Component Value Date    HDL 72 04/01/2021    LDLCALC 67 10/30/2013    TRIG 101 07/06/2018     LIVER PROFILE:  Recent Labs     01/07/22  0455   AST 30   ALT 12   LABALBU 4.3       ASSESSMENT:    Patient Active Problem List   Diagnosis    Chronic atrial fibrillation (HCC)    Dyslipidemia    Gastroesophageal reflux disease without esophagitis    Osteoarthritis of lumbar spine    OA (osteoarthritis) of knee, bilateral    Foot drop, right    Hallux valgus, acquired, bilateral    Hearing impairment    Essential hypertension    Slow transit constipation    MARIAMA on CPAP    Simple chronic bronchitis (HCC)    Non-intractable vomiting    Hospital-acquired bacterial pneumonia    Pneumonia due to organism    COPD (chronic obstructive pulmonary disease) (HCC)    Chronic diastolic heart failure (HCC)    History of bariatric surgery including banding leading to esophageal stricture and aspiration    BPH (benign prostatic hyperplasia)    Myalgia    Atrial fibrillation with slow ventricular response ([address]    Pacemaker pocket hematoma, initial encounter    Pneumonia-community-acquired    Acute hypoxemic respiratory failure (HCC)    SIRS (systemic inflammatory response syndrome) (HCC)    Pulmonary hypertension (HCC)    Presence of permanent cardiac pacemaker    Aspiration pneumonia ([address]    SBO (small bowel obstruction) ([address]       PLAN:    1. Atrial fibrillation  2. Ventricular pacing  3. Abdominal pain  Continue supportive therapy  Continue rivaroxaban for stroke prophylaxis. Please see orders. Discussed with patient and nursing.     Israel Calhoun MD, MD

## 2022-01-09 NOTE — PROGRESS NOTES
General Surgery Progress Note    Subjective:     Patient with c/o headache this morning. Having BM and passing flatus. Notes that abdominal pain is markedly improved. NGT removed this morning. Scheduled Meds:   rivaroxaban  20 mg Oral Daily    bumetanide  1 mg IntraVENous BID    lipase-protease-amylase  24,000 Units Oral TID     budesonide-formoterol  2 puff Inhalation BID    fluticasone  2 spray Nasal Daily    metoprolol succinate  50 mg Oral Daily    rOPINIRole  0.25 mg Oral BID    tamsulosin  0.4 mg Oral Daily    sodium chloride flush  5-40 mL IntraVENous 2 times per day    famotidine (PEPCID) injection  20 mg IntraVENous BID     Continuous Infusions:   sodium chloride      dextrose 5% in lactated ringers 100 mL/hr at 01/09/22 0504     PRN Meds:polyvinyl alcohol, sodium chloride flush, albuterol sulfate HFA, benzonatate, tiZANidine, sodium chloride flush, sodium chloride, potassium chloride **OR** potassium alternative oral replacement **OR** potassium chloride, magnesium sulfate, ondansetron **OR** ondansetron, polyethylene glycol, morphine    Objective:   /65   Pulse 70   Temp 98.8 °F (37.1 °C) (Oral)   Resp 18   Ht 5' 10\" (1.778 m)   Wt 198 lb 8 oz (90 kg)   SpO2 95%   BMI 28.48 kg/m²     I/O last 3 completed shifts: In: 3999.9 [I.V.:3999.9]  Out: 2750 [Urine:2350; Emesis/NG output:400]    Chest:  Respiratory effort was normal with no retractions or use of accessory muscles. Cardiovascular: Heart sounds were normal with a regular rate and rhythm. Abdomen: The abdomen was soft and non distended. No rebound or guarding. Non tender.      LABS:  Lab Results   Component Value Date    WBC 4.6 01/08/2022    RBC 3.13 01/08/2022    RBC 4.39 09/13/2011    HGB 10.0 01/08/2022    HCT 32.2 01/08/2022    .9 01/08/2022    MCH 31.9 01/08/2022    MCHC 31.1 01/08/2022    RDW 13.5 01/08/2022     01/08/2022     09/13/2011    MPV 8.6 01/08/2022       Lab Results Component Value Date     01/08/2022    K 4.0 01/08/2022     01/08/2022    CO2 30 01/08/2022    BUN 15 01/08/2022    CREATININE 0.78 01/08/2022    GLUCOSE 124 01/08/2022    GLUCOSE 99 09/13/2011    CALCIUM 8.7 01/08/2022       Assessment/Plan:   1. Abdominal pain with concern for small bowel obstruction after vertical banded gastroplasty via midline incision  2. Improving clinically, NGT removed this morning. CLD started.     Electronically signed by Jie Borden DO  on 1/9/2022 at 8:55 AM

## 2022-01-09 NOTE — PROGRESS NOTES
[facility]  Office: [address], DO, Bassem Hawkins, DO, Hilda Yadav, DO, Marian Burch, DO, Vicky Patel MD, Keeley Be MD, Rex Cristina MD, Kay Pandey MD, Dot Williamson MD, Russel Chávez MD, David Vee MD, Dave Sprague, DO, Ion Jacobson DO, Josselin Hinton MD,  Francesca Donnelly DO, Tony Kilgore MD, Harvinder Sanon MD, Cata Carreon MD, John Kamara MD, Elsy Raygoza MD, Vanessa Malik MD, Louis Montoya MD, Claudio Rasmussen [facility], [address], CNP, Jackie Amin, CNP, Olivier Finley, CNS, Tom Schreiber, CNP, Gabriela Monson, CNP, Daxa Dc, CNP, Yumiko Rodriguez, CNP, Alicia Szymanski, CNP, Chris Klein PA-C, Theresa Villatoro, [facility], Daylin Bee [facility], Joseph Archuleta, CNP, Nupur Cotton, CNP, Rk Cheng, CNP, Tonio Banerjee CNP, Kaylynn Yin [facility], Hua Reed [facility]    Progress Note    1/9/2022    1:33 PM    Name:   Sarah Adam  MRN:     [id number]     Acct:      [de-identified]   Room:   2014/2014-02  IP Day:  2  Admit Date:  1/7/2022  4:49 AM    PCP:   JULIAN Sorenson CNP  Code Status:  Full Code    Subjective:     C/C:   Chief Complaint   Patient presents with   [facility] Abdominal Pain     RLQ    Emesis     x2 days     Interval History Status:   Having bowel movements, passing flatus  Denies pain abdomen today  NG tube has been removed today and started on CLD    Brief History:   Sarah Adam is a 77 y.o. Non- / non  male who presents with Abdominal Pain (RLQ) and Emesis (x2 days)   and is admitted to the hospital for the management of SBO (small bowel obstruction) ([facility]    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 Patient reports she has dealt with these for many years.   Patient reports that approximately 36 hours ago he developed right lower quadrant pain followed by severe nausea. He reported that this resolved and improved temporarily and then returned approximately 12 hours ago significantly worse. Patient reports that his pain was at a 9/10 and was a cramping pain in nature. Patient was quickly followed by severe nausea and intractable vomiting. Patient reported to the emergency department where he was found to have findings consistent with small bowel obstruction. Patient reports that since this morning he has been incontinent of stool. Patient is scheduled to have a evaluation at a tertiary center in [facility] with their gastroenterology team due to his recurrent abdominal problems. ER did consult general surgery at this facility and they are comfortable managing case care. Patient will be managed medically for the time being      Review of Systems:     Constitutional:  negative for chills, fevers, sweats  Respiratory:  negative for cough, dyspnea on exertion, shortness of breath, wheezing  Cardiovascular:  negative for chest pain, chest pressure/discomfort, lower extremity edema, palpitations  Gastrointestinal:  negative for abdominal pain, constipation, diarrhea, nausea, vomiting  Neurological:  negative for dizziness, headache    Medications: Allergies:     Allergies   Allergen Reactions    Darvocet [Propoxyphene N-Acetaminophen] Hives    Other Hives    Oxycodone-Acetaminophen     Propoxyphene      Other reaction(s): Unknown       Current Meds:   Scheduled Meds:    rivaroxaban  20 mg Oral Daily    bumetanide  1 mg IntraVENous BID    lipase-protease-amylase  24,000 Units Oral TID     budesonide-formoterol  2 puff Inhalation BID    fluticasone  2 spray Nasal Daily    metoprolol succinate  50 mg Oral Daily    rOPINIRole  0.25 mg Oral BID    tamsulosin  0.4 mg Oral Daily    sodium chloride flush  5-40 mL IntraVENous 2 times per day    famotidine (PEPCID) injection  20 mg IntraVENous BID     Continuous Infusions:    sodium chloride      dextrose 5% in lactated ringers 50 mL/hr at 01/09/22 1128     PRN Meds: polyvinyl alcohol, sodium chloride flush, albuterol sulfate HFA, benzonatate, tiZANidine, sodium chloride flush, sodium chloride, potassium chloride **OR** potassium alternative oral replacement **OR** potassium chloride, magnesium sulfate, ondansetron **OR** ondansetron, polyethylene glycol, morphine    Data:     Past Medical History:   has a past medical history of Acute superficial gastritis without hemorrhage, Anastomotic stricture of stomach, Asthma, Atrial fibrillation ([facility] Calculus of gallbladder without cholecystitis without obstruction, Chronic diastolic heart failure ([facility] Chronic rhinosinusitis, Class 2 severe obesity due to excess calories with serious comorbidity and body mass index (BMI) of 36.0 to 36.9 in [facility]), COPD (chronic obstructive pulmonary disease) ([facility] E. coli UTI, Foot drop, right, Fracture of femur ([facility] Gait disorder, Gastric out let obstruction, GERD (gastroesophageal reflux disease), Hallux valgus, acquired, bilateral, Hyperlipidemia, Hypertension, Impaired hearing, Internal hemorrhoids, Lymphedema of both lower extremities, Nausea & vomiting, OA (osteoarthritis) of knee, bilateral, Obesity, MARIAMA on CPAP, Osteoarthritis, Osteoarthritis of lumbar spine, S/P revision of total knee, Septicemia due to E. coli ([facility] Sick sinus syndrome ([facility] Simple chronic bronchitis ([facility] Slow transit constipation, Unspecified sleep apnea, and UTI (urinary tract infection). Social History:   reports that he quit smoking about 45 years ago. He has a 20.00 pack-year smoking history. He has never used smokeless tobacco. He reports that he does not drink alcohol and does not use drugs.      Family History:   Family History   Problem Relation Age of Onset    Cancer Mother     Heart Attack Father        Vitals:  BP (!) 110/58   Pulse 85   Temp 98.8 °F (37.1 °C) (Oral)   Resp 18   Ht 5' 10\" (1.778 m)   Wt 198 lb 8 oz (90 kg)   SpO2 95%   BMI 28.48 kg/m²   Temp (24hrs), Av.4 °F (36.9 °C), Min:98.2 °F (36.8 °C), Max:98.8 °F (37.1 °C)    No results for input(s): POCGLU in the last 72 hours. I/O (24Hr): Intake/Output Summary (Last 24 hours) at 2022 1333  Last data filed at 2022 1310  Gross per 24 hour   Intake 2123.37 ml   Output 1025 ml   Net 1098.37 ml       Labs:  Hematology:  Recent Labs     22  0647   WBC 9.5 4.6   RBC 3.60* 3.13*   HGB 11.5* 10.0*   HCT 36.3* 32.2*   .8 102.9   MCH 31.9 31.9   MCHC 31.7 31.1   RDW 13.2 13.5    118*   MPV 8.8 8.6   INR  --  1.4     Chemistry:  Recent Labs     22  0455 22  0747 22  0647     --  143   K 4.2  --  4.0   CL 96*  --  106   CO2 28  --  30   GLUCOSE 123*  --  124*   BUN 20  --  15   CREATININE 0.96  --  0.78   ANIONGAP 12  --  7*   LABGLOM >60  --  >60   GFRAA >60  --  >60   CALCIUM 9.7  --  8.7   PROBNP 5,319*  --   --    TROPHS 43* 42*  --      Recent Labs     22   PROT 7.3   LABALBU 4.3   AST 30   ALT 12   ALKPHOS 120   BILITOT 0.73   LIPASE 63*     ABG:  Lab Results   Component Value Date    FIO2 NOT REPORTED 2021     Lab Results   Component Value Date/Time    SPECIAL NOT REPORTED 10/21/2021 10:46 PM     Lab Results   Component Value Date/Time    CULTURE NO SIGNIFICANT GROWTH 10/21/2021 10:46 PM       Radiology:  CT ABDOMEN PELVIS W IV CONTRAST Additional Contrast? None    Result Date: 2022  Findings of small-bowel obstruction with suspected transition point in the lower anterior abdomen on the right. There is evidence of prior gastric bypass surgery. Small amount of reactive free fluid is seen in the pelvis Cholelithiasis.   No cholecystitis Hyperdense nodule projecting posteriorly off the right kidney not clearly a simple cyst.  When the patient's acute symptoms have resolved, recommend follow-up abdominal CT or MRI with and without IV contrast, renal protocol to evaluate MD  1/9/2022  1:33 PM

## 2022-01-10 VITALS
RESPIRATION RATE: 18 BRPM | SYSTOLIC BLOOD PRESSURE: 104 MMHG | OXYGEN SATURATION: 98 % | HEART RATE: 70 BPM | TEMPERATURE: 98.9 F | HEIGHT: 70 IN | WEIGHT: 199.5 LBS | BODY MASS INDEX: 28.56 KG/M2 | DIASTOLIC BLOOD PRESSURE: 59 MMHG

## 2022-01-10 PROCEDURE — 97110 THERAPEUTIC EXERCISES: CPT

## 2022-01-10 PROCEDURE — 2580000003 HC RX 258: Performed by: SURGERY

## 2022-01-10 PROCEDURE — 6370000000 HC RX 637 (ALT 250 FOR IP): Performed by: INTERNAL MEDICINE

## 2022-01-10 PROCEDURE — 6370000000 HC RX 637 (ALT 250 FOR IP): Performed by: NURSE PRACTITIONER

## 2022-01-10 PROCEDURE — 6370000000 HC RX 637 (ALT 250 FOR IP): Performed by: SURGERY

## 2022-01-10 PROCEDURE — 99232 SBSQ HOSP IP/OBS MODERATE 35: CPT | Performed by: INTERNAL MEDICINE

## 2022-01-10 PROCEDURE — 2500000003 HC RX 250 WO HCPCS: Performed by: NURSE PRACTITIONER

## 2022-01-10 PROCEDURE — 97530 THERAPEUTIC ACTIVITIES: CPT

## 2022-01-10 PROCEDURE — 2500000003 HC RX 250 WO HCPCS: Performed by: SURGERY

## 2022-01-10 PROCEDURE — 6360000002 HC RX W HCPCS: Performed by: NURSE PRACTITIONER

## 2022-01-10 RX ORDER — BUMETANIDE 1 MG/1
TABLET ORAL
Qty: 112 TABLET | Refills: 2 | Status: ON HOLD
Start: 2022-01-10 | End: 2022-02-08 | Stop reason: HOSPADM

## 2022-01-10 RX ORDER — KETOROLAC TROMETHAMINE 30 MG/ML
15 INJECTION, SOLUTION INTRAMUSCULAR; INTRAVENOUS ONCE
Status: COMPLETED | OUTPATIENT
Start: 2022-01-10 | End: 2022-01-10

## 2022-01-10 RX ORDER — CETIRIZINE HYDROCHLORIDE 10 MG/1
10 TABLET ORAL DAILY
Qty: 28 TABLET | Refills: 2 | Status: SHIPPED | OUTPATIENT
Start: 2022-01-10

## 2022-01-10 RX ORDER — DOCUSATE SODIUM 100 MG/1
100 CAPSULE, LIQUID FILLED ORAL 2 TIMES DAILY
Status: DISCONTINUED | OUTPATIENT
Start: 2022-01-10 | End: 2022-01-10 | Stop reason: HOSPADM

## 2022-01-10 RX ORDER — DOCUSATE SODIUM 100 MG/1
CAPSULE, LIQUID FILLED ORAL
Qty: 60 CAPSULE | Refills: 0 | Status: ON HOLD | OUTPATIENT
Start: 2022-01-10 | End: 2022-02-01

## 2022-01-10 RX ORDER — METOPROLOL SUCCINATE 50 MG/1
TABLET, EXTENDED RELEASE ORAL
Qty: 28 TABLET | Refills: 3 | Status: ON HOLD
Start: 2022-01-10 | End: 2022-02-08 | Stop reason: HOSPADM

## 2022-01-10 RX ORDER — PANCRELIPASE 24000; 76000; 120000 [USP'U]/1; [USP'U]/1; [USP'U]/1
CAPSULE, DELAYED RELEASE PELLETS ORAL
Qty: 84 CAPSULE | Refills: 3 | Status: SHIPPED | OUTPATIENT
Start: 2022-01-10

## 2022-01-10 RX ADMIN — KETOROLAC TROMETHAMINE 15 MG: 30 INJECTION, SOLUTION INTRAMUSCULAR; INTRAVENOUS at 05:32

## 2022-01-10 RX ADMIN — TAMSULOSIN HYDROCHLORIDE 0.4 MG: 0.4 CAPSULE ORAL at 09:36

## 2022-01-10 RX ADMIN — DICLOFENAC SODIUM 2 G: 10 GEL TOPICAL at 03:38

## 2022-01-10 RX ADMIN — SODIUM CHLORIDE, SODIUM LACTATE, POTASSIUM CHLORIDE, CALCIUM CHLORIDE AND DEXTROSE MONOHYDRATE: 5; 600; 310; 30; 20 INJECTION, SOLUTION INTRAVENOUS at 05:36

## 2022-01-10 RX ADMIN — PANCRELIPASE 24000 UNITS: 24000; 76000; 120000 CAPSULE, DELAYED RELEASE PELLETS ORAL at 17:41

## 2022-01-10 RX ADMIN — BUMETANIDE 1 MG: 0.25 INJECTION INTRAMUSCULAR; INTRAVENOUS at 09:36

## 2022-01-10 RX ADMIN — DOCUSATE SODIUM 100 MG: 100 CAPSULE ORAL at 09:35

## 2022-01-10 RX ADMIN — RIVAROXABAN 20 MG: 20 TABLET, FILM COATED ORAL at 17:41

## 2022-01-10 RX ADMIN — METOPROLOL SUCCINATE 50 MG: 50 TABLET, EXTENDED RELEASE ORAL at 09:36

## 2022-01-10 RX ADMIN — ACETAMINOPHEN 650 MG: 325 TABLET ORAL at 03:33

## 2022-01-10 RX ADMIN — BUDESONIDE AND FORMOTEROL FUMARATE DIHYDRATE 2 PUFF: 160; 4.5 AEROSOL RESPIRATORY (INHALATION) at 09:37

## 2022-01-10 RX ADMIN — ROPINIROLE HYDROCHLORIDE 0.25 MG: 0.25 TABLET, FILM COATED ORAL at 09:35

## 2022-01-10 RX ADMIN — FAMOTIDINE 20 MG: 10 INJECTION, SOLUTION INTRAVENOUS at 09:35

## 2022-01-10 RX ADMIN — FLUTICASONE PROPIONATE 2 SPRAY: 50 SPRAY, METERED NASAL at 09:43

## 2022-01-10 RX ADMIN — PANCRELIPASE 24000 UNITS: 24000; 76000; 120000 CAPSULE, DELAYED RELEASE PELLETS ORAL at 11:59

## 2022-01-10 RX ADMIN — PANCRELIPASE 24000 UNITS: 24000; 76000; 120000 CAPSULE, DELAYED RELEASE PELLETS ORAL at 09:43

## 2022-01-10 ASSESSMENT — PAIN DESCRIPTION - FREQUENCY
FREQUENCY: CONTINUOUS
FREQUENCY: CONTINUOUS

## 2022-01-10 ASSESSMENT — PAIN DESCRIPTION - PAIN TYPE
TYPE: ACUTE PAIN
TYPE: ACUTE PAIN

## 2022-01-10 ASSESSMENT — PAIN DESCRIPTION - LOCATION
LOCATION: HEAD
LOCATION: HEAD

## 2022-01-10 ASSESSMENT — PAIN - FUNCTIONAL ASSESSMENT
PAIN_FUNCTIONAL_ASSESSMENT: PREVENTS OR INTERFERES SOME ACTIVE ACTIVITIES AND ADLS
PAIN_FUNCTIONAL_ASSESSMENT: PREVENTS OR INTERFERES SOME ACTIVE ACTIVITIES AND ADLS

## 2022-01-10 ASSESSMENT — PAIN SCALES - GENERAL
PAINLEVEL_OUTOF10: 8
PAINLEVEL_OUTOF10: 2
PAINLEVEL_OUTOF10: 3

## 2022-01-10 ASSESSMENT — PAIN DESCRIPTION - ONSET
ONSET: ON-GOING
ONSET: ON-GOING

## 2022-01-10 ASSESSMENT — PAIN DESCRIPTION - DESCRIPTORS
DESCRIPTORS: HEADACHE
DESCRIPTORS: HEADACHE

## 2022-01-10 ASSESSMENT — PAIN DESCRIPTION - PROGRESSION: CLINICAL_PROGRESSION: GRADUALLY WORSENING

## 2022-01-10 ASSESSMENT — PAIN DESCRIPTION - ORIENTATION: ORIENTATION: UPPER

## 2022-01-10 NOTE — PROGRESS NOTES
[facility]   Section of [facility]iology  Progress Note      Date:  1/10/2022  Patient: Rebecca Ge  Admission:  1/7/2022  4:49 AM  Admit DX: SBO (small bowel obstruction) ([facility] [O14.821]  Age:  77 y. o., 1945                           LOS: 3 days     Reason for evaluation:   atrial fibrillation  Sp pacemaker implant. SUBJECTIVE:     The patient was seen and examined. Notes and labs reviewed. There were not complications over night. Patient's cardiac review of systems: negative. OBJECTIVE:    /73   Pulse 69   Temp 98.1 °F (36.7 °C) (Oral)   Resp 18   Ht 5' 10\" (1.778 m)   Wt 199 lb 8 oz (90.5 kg)   SpO2 96%   BMI 28.63 kg/m²     Intake/Output Summary (Last 24 hours) at 1/10/2022 1053  Last data filed at 1/10/2022 0542  Gross per 24 hour   Intake 1594.77 ml   Output 275 ml   Net 1319.77 ml       EXAM:   CONSTITUTIONAL:  awake, alert, cooperative, no apparent distress, and appears stated age. HEENT: Normal jugular venous pulsations, no carotid bruits. Head is atraumatic, normocephalic. Eyes and oral mucosa are normal.  LUNGS: Good respiratory effort. No for increased work of breathing. On auscultation: clear to auscultation bilaterally  CARDIOVASCULAR:  Normal apical impulse, regular rate and rhythm, normal S1 and S2, no S3 or S4, and no murmur or rub noted. ABDOMEN: Soft, nontender, nondistended. Bowel sounds present. No masses or tenderness. SKIN: Warm and dry. EXTREMITIES: No lower extremity edema. Motor movement grossly intact. No cyanosis or clubbing.     Current Inpatient Medications:   docusate sodium  100 mg Oral BID    rivaroxaban  20 mg Oral Daily    bumetanide  1 mg IntraVENous BID    lipase-protease-amylase  24,000 Units Oral TID     budesonide-formoterol  2 puff Inhalation BID    fluticasone  2 spray Nasal Daily    metoprolol succinate  50 mg Oral Daily    rOPINIRole  0.25 mg Oral BID    tamsulosin  0.4 mg Oral Daily    sodium chloride flush  5-40 mL IntraVENous 2 times per day    famotidine (PEPCID) injection  20 mg IntraVENous BID       IV Infusions (if any):   sodium chloride      dextrose 5% in lactated ringers 50 mL/hr at 01/10/22 0536       Diagnostics:   Telemetry: ventricular pacing. Labs:   CBC:   Recent Labs     01/08/22 0647   WBC 4.6   HGB 10.0*   HCT 32.2*   *     BMP:   Recent Labs     01/08/22 0647      K 4.0   CO2 30   BUN 15   CREATININE 0.78   LABGLOM >60   GLUCOSE 124*     BNP: No results for input(s): BNP in the last 72 hours. PT/INR:   Recent Labs     01/08/22 0647   PROTIME 16.8*   INR 1.4     APTT:No results for input(s): APTT in the last 72 hours. CARDIAC ENZYMES:No results for input(s): CKTOTAL, CKMB, CKMBINDEX, TROPONINI in the last 72 hours. FASTING LIPID PANEL:  Lab Results   Component Value Date    HDL 72 04/01/2021    LDLCALC 67 10/30/2013    TRIG 101 07/06/2018     LIVER PROFILE:  No results for input(s): AST, ALT, LABALBU in the last 72 hours.     ASSESSMENT:    Patient Active Problem List   Diagnosis    Chronic atrial fibrillation (HCC)    Dyslipidemia    Gastroesophageal reflux disease without esophagitis    Osteoarthritis of lumbar spine    OA (osteoarthritis) of knee, bilateral    Foot drop, right    Hallux valgus, acquired, bilateral    Hearing impairment    Essential hypertension    Slow transit constipation    MARIAMA on CPAP    Simple chronic bronchitis (HCC)    Non-intractable vomiting    Hospital-acquired bacterial pneumonia    Pneumonia due to organism    COPD (chronic obstructive pulmonary disease) (HCC)    Chronic diastolic heart failure (HCC)    History of bariatric surgery including banding leading to esophageal stricture and aspiration    BPH (benign prostatic hyperplasia)    Myalgia    Atrial fibrillation with slow ventricular response ([address]    Pacemaker pocket hematoma, initial encounter    Pneumonia-community-acquired    Acute hypoxemic respiratory failure (HCC)    SIRS (systemic inflammatory response syndrome) (HCC)    Pulmonary hypertension (HCC)    Presence of permanent cardiac pacemaker    Aspiration pneumonia (HCC)    SBO (small bowel obstruction) ([address]       PLAN:    1. Atrial fibrillation  2. Ventricular pacing  3. Abdominal pain  Continue supportive therapy  Continue rivaroxaban for stroke prophylaxis. Ok to discharge from a cardiology standpoint  Please see orders. Discussed with patient and nursing.     Virginia Ely MD, MD

## 2022-01-10 NOTE — PROGRESS NOTES
Occupational Therapy  Facility/Department: [facility]  Daily Treatment Note  NAME: Mike Gillette  : 1945  MRN: [id number]    Date of Service: 1/10/2022   RN Anna Bustos reports patient is medically stable for therapy treatment this date. Chart reviewed prior to treatment and patient is agreeable for therapy. All lines intact and patient positioned comfortably at end of treatment. All patient needs addressed prior to ending therapy session. Discharge Recommendations:  Patient would benefit from continued therapy after discharge  OT Equipment Recommendations  ADL Assistive Devices: Long-handled Shoe Horn;Long-handled Sponge;Reacher;Sock-Aid Hard   Due to recent hospitalization and medical condition, pt would benefit from additional therapy at time of discharge to ensure safety. Please refer to the AM-PAC score for current functional status. Assessment   Performance deficits / Impairments: Decreased ADL status; Decreased functional mobility ; Decreased strength;Decreased high-level IADLs;Decreased endurance;Decreased safe awareness;Decreased cognition;Decreased balance;Decreased posture;Decreased fine motor control  Assessment: Pt would benefit from continued skilled OT services to increase I and safety during functional tasks to return home at prior level of function as able. Prognosis: Good  OT Education: OT Role;Transfer Training;Energy Conservation;Plan of Care;ADL Adaptive Strategies; Family Education;Home Exercise Program  Patient Education: benefits of being oob, fall prevention/call light use, recommendations for continued therapy, OT POC, performing exercises on own, RW safety/mgmt, home safety  REQUIRES OT FOLLOW UP: Yes  Activity Tolerance  Activity Tolerance: Patient Tolerated treatment well  Activity Tolerance: fair  Safety Devices  Safety Devices in place: Yes  Type of devices: Nurse notified;Gait belt;Call light within reach; Patient at risk for falls; Left in chair         Patient Diagnosis(es): The encounter diagnosis was SBO (small bowel obstruction) ([address] has a past medical history of Acute superficial gastritis without hemorrhage, Anastomotic stricture of stomach, Asthma, Atrial fibrillation ([address] Calculus of gallbladder without cholecystitis without obstruction, Chronic diastolic heart failure (HCC), Chronic rhinosinusitis, Class 2 severe obesity due to excess calories with serious comorbidity and body mass index (BMI) of 36.0 to 36.9 in [facility]), COPD (chronic obstructive pulmonary disease) ([address] E. coli septicemia ([address] E. coli UTI, Foot drop, right, Fracture of femur ([address] Gait disorder, Gastric out let obstruction, GERD (gastroesophageal reflux disease), Hallux valgus, acquired, bilateral, Hyperlipidemia, Hypertension, Impaired hearing, Internal hemorrhoids, Lymphedema of both lower extremities, Nausea & vomiting, OA (osteoarthritis) of knee, bilateral, Obesity, MARIAMA on CPAP, Osteoarthritis, Osteoarthritis of lumbar spine, S/P revision of total knee, Septicemia due to E. coli ([address] Sick sinus syndrome ([address] Simple chronic bronchitis ([address] Slow transit constipation, Unspecified sleep apnea, and UTI (urinary tract infection). has a past surgical history that includes Finger amputation (1966); Gastric bypass surgery (1985); Carpal tunnel release; Colonoscopy; Rotator cuff repair; joint replacement (2004 & 2005); Hemorrhoid surgery; pr egd transoral biopsy single/multiple (N/A, 05/25/2018); Upper gastrointestinal endoscopy (08/13/2018); Upper gastrointestinal endoscopy (N/A, 08/13/2018); Upper gastrointestinal endoscopy (N/A, 08/13/2018); Upper gastrointestinal endoscopy (08/16/2018); pr egd flexible foreign body removal (N/A, 08/16/2018); Upper gastrointestinal endoscopy (08/16/2018); Upper gastrointestinal endoscopy (N/A, 10/01/2018); Upper gastrointestinal endoscopy (10/01/2018); Upper gastrointestinal endoscopy (N/A, 11/19/2018); Cystoscopy (01/02/2019);  Cystoscopy (N/A, 01/02/2019); Upper gastrointestinal endoscopy (N/A, 10/15/2020); Colonoscopy (N/A, 04/05/2021); Pacemaker insertion (04/09/2021); and Upper gastrointestinal endoscopy (N/A, 7/16/2021). Restrictions  Restrictions/Precautions  Restrictions/Precautions: General Precautions,Isolation,Up as Tolerated  Required Braces or Orthoses?: Yes  Required Braces or Orthoses  Right Lower Extremity Brace: Ankle Foot Orthotics  Left Lower Extremity Brace: Erika Foot Orthotics  Position Activity Restriction  Other position/activity restrictions: R UE IV, up with assist, RUE IV  Subjective   General  Chart Reviewed: Yes  Patient assessed for rehabilitation services?: Yes  Family / Caregiver Present: Yes (wife in room)  Subjective  Subjective: \"Yeah I'll work with ya\"  General Comment  Comments: Pt sitting up in chair agreeable to OT session      Orientation  Orientation  Overall Orientation Status: Within Functional Limits  Objective    ADL  Additional Comments: Pt declined ADLs at this time        Balance  Sitting Balance: Stand by assistance  Standing Balance: Contact guard assistance (w/RW)  Standing Balance  Time: standing ~ 2-3 min  Activity: functional mobility  Comment: chair <> door <> bathroom <> door <> chair  Functional Mobility  Functional - Mobility Device: Rolling Walker  Assist Level: Contact guard assistance  Functional Mobility Comments: Pt given Min verbal cues for upright posture, staying inside RW, visual scanning, keeping eyes upward, pacing self and awareness/assist with multiple lines all to increase safety. Tub Transfers  Tub - Transfer To: Shower seat without back  Tub - Technique: Ambulating  Tub Transfers: Contact guard  Tub Transfers Comments: Pt verbalizes he has a shower bench at home with grab bars so a mock shower transfer was performed. Pt performed transfer w/o difficulty and good use of grab bars. Pt did require cueing to keep RW close by. Bed mobility  Comment: N/T.  Pt sitting up in chair upon arrival and retired to it at end of session. Transfers  Sit to stand: Contact guard assistance (w/RW)  Stand to sit: Contact guard assistance  Transfer Comments: Pt given Min verbal cues for RW safety, squaring self/AD and reaching back prior to sitting, controlled stand to sit, pacing self and slowing down movements all to increase safety. Cognition  Overall Cognitive Status: WFL                    Type of ROM/Therapeutic Exercise  Type of ROM/Therapeutic Exercise: Resistive Bands;AROM  Comment: Pt participated in theraband exercises with RUE and some AROM with LUE to maintain/increase strength needed for increased IND in ADLs. Pt had difficulty with ROM with LUE d/t pain and was told to perform exercises as tolerated. Min vc's for taking proper rest breaks, pursed lip breathing, pacing, and proper tech  Exercises  Shoulder Flexion: x10  Shoulder Extension: x10  Shoulder ADduction: x10  Horizontal ABduction: x10  Elbow Flexion: x10  Elbow Extension: x10  Supination: x10  Pronation: x10  Wrist Flexion: x10  Wrist Extension: x10                    Plan   Plan  Times per week: 4-5x/wk 1x/day as Daniel Mendez  Current Treatment Recommendations: Strengthening,Endurance Training,Functional Mobility Training,Balance Training,Safety Education & Training,Home Management Training,Self-Care / ADL,Equipment Evaluation, Education, & procurement                                                  AM-PAC Score        AM-[facility] Inpatient Daily Activity Raw Score: 15 (01/10/22 1057)  -PAC Inpatient ADL T-Scale Score : 34.69 (01/10/22 1057)  ADL Inpatient CMS 0-100% Score: 56.46 (01/10/22 1057)  ADL Inpatient CMS G-Code Modifier : CK (01/10/22 1057)    Goals  Short term goals  Time Frame for Short term goals: By discharge, pt to demo  Short term goal 1: ADL transfers and functional mobility to Mod I with use of AD as needed. Short term goal 2: bed mobility to SBA with use of bedrails as needed.   Short term goal 3: increased B UE strength by 1/2 grade to assist with functional tasks/I with  B UE HEP with use of handouts as needed. Short term goal 4: UB ADLs to Set up and LB ADLs to Min A with use of AD/AE as needed. Short term goal 5: toileting to Min A with use of AD/grab bars as needed. Long term goals  Long term goal 1: Pt to be I with fall prevention education, EC/WS tech, recommendations for AE with use of handouts as needed. Patient Goals   Patient goals : To go home! Therapy Time   Individual Concurrent Group Co-treatment   Time In 8775         Time Out 1106         Minutes 26               Upon writer exit, call light within reach, pt retired to chair. All lines intact and patient positioned comfortably. All patient needs addressed prior to ending therapy session. Chart reviewed prior to treatment and patient is agreeable for therapy. RN reports patient is medically stable for therapy treatment this date.       BRIA Peña

## 2022-01-10 NOTE — PROGRESS NOTES
General Surgery Progress Note    Subjective:     Patient with c/o headache again this morning. Having BM and passing flatus. Notes that abdominal pain is markedly improved. Tolerated FLD. Scheduled Meds:   rivaroxaban  20 mg Oral Daily    bumetanide  1 mg IntraVENous BID    lipase-protease-amylase  24,000 Units Oral TID     budesonide-formoterol  2 puff Inhalation BID    fluticasone  2 spray Nasal Daily    metoprolol succinate  50 mg Oral Daily    rOPINIRole  0.25 mg Oral BID    tamsulosin  0.4 mg Oral Daily    sodium chloride flush  5-40 mL IntraVENous 2 times per day    famotidine (PEPCID) injection  20 mg IntraVENous BID     Continuous Infusions:   sodium chloride      dextrose 5% in lactated ringers 50 mL/hr at 01/10/22 0536     PRN Meds:acetaminophen, diclofenac sodium, polyvinyl alcohol, sodium chloride flush, albuterol sulfate HFA, benzonatate, tiZANidine, sodium chloride flush, sodium chloride, potassium chloride **OR** potassium alternative oral replacement **OR** potassium chloride, magnesium sulfate, ondansetron **OR** ondansetron, polyethylene glycol, morphine    Objective:   /69   Pulse 71   Temp 98.3 °F (36.8 °C) (Oral)   Resp 18   Ht 5' 10\" (1.778 m)   Wt 199 lb 8 oz (90.5 kg)   SpO2 96%   BMI 28.63 kg/m²     I/O last 3 completed shifts: In: 3101.5 [I.V.:3101.5]  Out: 2000 [Urine:1700; Emesis/NG output:300]    General: awake, alert, NAD    Chest:  Respiratory effort was normal with no retractions or use of accessory muscles. Cardiovascular: Heart sounds were normal with a regular rate and rhythm. Abdomen: The abdomen was soft and non distended. No rebound or guarding. Non tender.      LABS:  Lab Results   Component Value Date    WBC 4.6 01/08/2022    RBC 3.13 01/08/2022    RBC 4.39 09/13/2011    HGB 10.0 01/08/2022    HCT 32.2 01/08/2022    .9 01/08/2022    MCH 31.9 01/08/2022    MCHC 31.1 01/08/2022    RDW 13.5 01/08/2022     01/08/2022     09/13/2011    MPV 8.6 01/08/2022       Lab Results   Component Value Date     01/08/2022    K 4.0 01/08/2022     01/08/2022    CO2 30 01/08/2022    BUN 15 01/08/2022    CREATININE 0.78 01/08/2022    GLUCOSE 124 01/08/2022    GLUCOSE 99 09/13/2011    CALCIUM 8.7 01/08/2022       Assessment/Plan:   1. Abdominal pain with concern for small bowel obstruction after vertical banded gastroplasty via midline incision  2.  Improving clinically, advance to low fiber diet this morning    Electronically signed by Lars Null DO  on 1/10/2022 at 6:47 AM

## 2022-01-10 NOTE — PROGRESS NOTES
Physical Therapy  DATE: 1/10/2022    NAME: Beatrice Beltran  MRN: [id number]   : 1945    Patient not seen this date for Physical Therapy due to:      [] Cancel by RN or physician due to:    [] Hemodialysis    [] Critical Lab Value Level     [] Blood transfusion in progress    [] Acute or unstable cardiovascular status   _MAP < 55 or more than >115  _HR < 40 or > 130    [] Acute or unstable pulmonary status   -FiO2 > 60%   _RR < 5 or >40    _O2 sats < 85%    [] Strict Bedrest    [] Off Unit for surgery or procedure    [] Off Unit for testing       [] Pending imaging to R/O fracture    [] Refusal by Patient      [x] Other (Patient working with OT staff. Will attempt again if time permits)      [] PT being discontinued at this time. Patient independent. No further needs. [] PT being discontinued at this time as the patient has been transferred to hospice care. No further needs.       Abilio Marquez, PTA

## 2022-01-10 NOTE — DISCHARGE SUMMARY
[facility]  Office: [address], DO, Jacqueline Peck, DO, Jocy Castellon, DO, Loki Sauceda Blood, DO, Cachorro Braswell MD, Cristal Patrick MD, Kristi Hermosillo MD, Nawaf Partida MD, Sera Guy MD, Bruno Voss MD, Frandy De La Torre MD, Brennan Licea, DO, Joselito Vail DO, Moose Ordonez MD,  Ella Schaefer DO, Yolanda Cabrales MD, Yoanna Dominguez MD, Jamar Avilez MD, Namrata Hernandez MD, Js Parkinson MD, Ann Rock MD, Lizzie Rosario MD, Martín Arechiga [facility], [facility], CNP, Tyson Prakash, CNP, Rubens White, CNS, Farnaz Pineda, CNP, Reshma Thorne, CNP, Sonja Allen, CNP, Kristin Medrano, CNP, Neptali Cuenca, CNP, Arely Deshpande PA-C, Frantz Hoffmann, [facility], Josh Alicia [facility], Baldo Flores, CNP, Cindy Verdin, CNP, Tran Jacskon CNP, Keshawn Chan, CNP, Claudette David, CNP, Juhi Diaz [facility]    Discharge Summary     Patient ID: Sinai Vicente  :  1945   MRN: [id number]     ACCOUNT:  [de-identified]   Patient's PCP: JULIAN Amos CNP  Admit Date: 2022   Discharge Date: 1/10/2022    Length of Stay: 3  Code Status:  Prior  Admitting Physician: Vincent Olmos MD  Discharge Physician: Vincent Olmos MD     Active Discharge Diagnoses:     Hospital Problem Lists:  Principal Problem:    SBO (small bowel obstruction) [facility])  Active Problems:    Chronic atrial fibrillation (HCC)    Dyslipidemia    Gastroesophageal reflux disease without esophagitis    Essential hypertension    Slow transit constipation    COPD (chronic obstructive pulmonary disease) ([facility] [address]    Chronic diastolic heart failure (HCC)    BPH (benign prostatic hyperplasia)  Resolved Problems:    * No resolved hospital problems.  *      Admission Condition:  poor     Discharged Condition: stable    Hospital Stay:   Admitting history:  Bennett marmolejo 77 y.o. Non- / non  male who presents with Abdominal Pain (RLQ) and Emesis (x2 days)   and is admitted to the hospital for the management of SBO (small bowel obstruction) ([facility] [address]    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  Patient reports she has dealt with these for many years. Mane Caban reports that approximately 36 hours ago he developed right lower quadrant pain followed by severe nausea.  He reported that this resolved and improved temporarily and then returned approximately 12 hours ago significantly worse. Mane Caban reports that his pain was at a 9/10 and was a cramping pain in nature.  Patient was quickly followed by severe nausea and intractable vomiting.  Patient reported to the emergency department where he was found to have findings consistent with small bowel obstruction.  Patient reports that since this morning he has been incontinent of stool. Mane Caban is scheduled to have a evaluation at a tertiary center in [place] with their gastroenterology team due to his recurrent abdominal problems. Leann Arnold did consult general surgery at this facility and they are comfortable managing case care. Mane Caban will be managed medically for the time being       Hospital Course:    Improved with conservative treatment  Having bowel movements, passing flatus  Denies pain abdomen today  Tolerating full liquid diet  Plan:         1. FLD as recommended by surgery  2. Start Xarelto , resume other home oral medicines  3.  Discharge home today       Significant therapeutic interventions: IV fluids, rest to bowels: Supportive and symptomatic    Significant Diagnostic Studies:   Labs / Micro:  CBC:   Lab Results   Component Value Date    WBC 4.6 01/08/2022    RBC 3.13 01/08/2022    RBC 4.39 09/13/2011    HGB 10.0 01/08/2022    HCT 32.2 01/08/2022    .9 01/08/2022    MCH 31.9 01/08/2022    MCHC 31.1 01/08/2022    RDW 13.5 01/08/2022     01/08/2022     09/13/2011     BMP:    Lab Results Component Value Date    GLUCOSE 124 01/08/2022    GLUCOSE 99 09/13/2011     01/08/2022    K 4.0 01/08/2022     01/08/2022    CO2 30 01/08/2022    ANIONGAP 7 01/08/2022    BUN 15 01/08/2022    CREATININE 0.78 01/08/2022    BUNCRER 19 01/08/2022    CALCIUM 8.7 01/08/2022    LABGLOM >60 01/08/2022    GFRAA >60 01/08/2022    GFR      01/08/2022    GFR NOT REPORTED 01/08/2022     HFP:    Lab Results   Component Value Date    PROT 7.3 01/07/2022     CMP:    Lab Results   Component Value Date    GLUCOSE 124 01/08/2022    GLUCOSE 99 09/13/2011     01/08/2022    K 4.0 01/08/2022     01/08/2022    CO2 30 01/08/2022    BUN 15 01/08/2022    CREATININE 0.78 01/08/2022    ANIONGAP 7 01/08/2022    ALKPHOS 120 01/07/2022    ALT 12 01/07/2022    AST 30 01/07/2022    BILITOT 0.73 01/07/2022    LABALBU 4.3 01/07/2022    ALBUMIN NOT REPORTED 01/07/2022    LABGLOM >60 01/08/2022    GFRAA >60 01/08/2022    GFR      01/08/2022    GFR NOT REPORTED 01/08/2022    PROT 7.3 01/07/2022    CALCIUM 8.7 01/08/2022     PT/INR:    Lab Results   Component Value Date    PROTIME 16.8 01/08/2022    PROTIME 16.6 08/16/2018    INR 1.4 01/08/2022     PTT:   Lab Results   Component Value Date    APTT 28.0 10/11/2020     FLP:    Lab Results   Component Value Date    CHOL 157 07/06/2018    CHOL 129 10/30/2013    TRIG 101 07/06/2018    HDL 72 04/01/2021     U/A:    Lab Results   Component Value Date    COLORU Yellow 01/07/2022    TURBIDITY Clear 01/07/2022    SPECGRAV 1.010 01/07/2022    HGBUR NEGATIVE 01/07/2022    PHUR 5.5 01/07/2022    PROTEINU NEGATIVE 01/07/2022    GLUCOSEU NEGATIVE 01/07/2022    KETUA NEGATIVE 01/07/2022    BILIRUBINUR NEGATIVE 01/07/2022    BILIRUBINUR Negative 10/21/2021    UROBILINOGEN Normal 01/07/2022    NITRU NEGATIVE 01/07/2022    LEUKOCYTESUR NEGATIVE 01/07/2022     TSH:    Lab Results   Component Value Date    TSH 1.32 06/16/2021        Radiology:  CT ABDOMEN PELVIS W IV CONTRAST Additional Contrast? None    Result Date: 1/7/2022  Findings of small-bowel obstruction with suspected transition point in the lower anterior abdomen on the right. There is evidence of prior gastric bypass surgery. Small amount of reactive free fluid is seen in the pelvis Cholelithiasis. No cholecystitis Hyperdense nodule projecting posteriorly off the right kidney not clearly a simple cyst.  When the patient's acute symptoms have resolved, recommend follow-up abdominal CT or MRI with and without IV contrast, renal protocol to evaluate for any solid internal enhancement RECOMMENDATIONS: Unavailable     XR CHEST PORTABLE    Result Date: 1/7/2022  1. Similar prominent appearance of the cardiac silhouette allowing for differences in technique. 2. No focal consolidation or overt pulmonary edema. XR ABDOMEN FOR NG/OG/NE TUBE PLACEMENT    Result Date: 1/9/2022  1. Gastric tube tip terminates in the region of the gastroesophageal junction. Recommend further advancement. 2. Multiple dilated loops of small bowel in the visualized abdomen likely correlating with findings on prior CT. XR ABDOMEN (2 VIEWS)    Result Date: 1/8/2022  Nonspecific bowel gas pattern without evidence for obstruction. Gastric tube with the tip in the mid gastric body. Consultations:    Consults:     Final Specialist Recommendations/Findings:   IP CONSULT TO HOSPITALIST  IP CONSULT TO GENERAL SURGERY  IP CONSULT TO CARDIOLOGY      The patient was seen and examined on day of discharge and this discharge summary is in conjunction with any daily progress note from day of discharge. Discharge plan:     Disposition: Home    Physician Follow Up:     JULIAN Woods - CNP  [address]  [phone]             Requiring Further Evaluation/Follow Up POST HOSPITALIZATION/Incidental Findings:  Follow-up with surgery as scheduled    Diet: Cardiac, consistency as recommended by surgery    Activity: As tolerated    Instructions to Patient: Shabnam Taylor and other home medications as prescribed    Discharge Medications:      Medication List      START taking these medications    diclofenac sodium 1 % Gel  Commonly known as: VOLTAREN  Apply 2 g topically 4 times daily as needed for Pain        CONTINUE taking these medications    Armodafinil 200 MG Tabs     ascorbic acid 500 MG tablet  Commonly known as: VITAMIN C  Take 1 tablet by mouth daily     benzonatate 100 MG capsule  Commonly known as: TESSALON  TAKE 1 CAPSULE BY MOUTH THREE TIMES DAILY AS NEEDED FOR COUGH     Boost Liqd  1 can twice a day for meals     Breo Ellipta 200-25 MCG/INH Aepb inhaler  Generic drug: Fluticasone furoate-vilanterol     bumetanide 1 MG tablet  Commonly known as: BUMEX  TAKE 2 TABLETS BY MOUTH EVERY MORNING AND TAKE 1 TABLET BY MOUTH IN THE AFTERNOON AND 1 TABLET EVERY EVENING IF WEIGHT IS >3 ABOVE BASELINE     carboxymethylcellulose 0.5 % Soln ophthalmic solution  Commonly known as: REFRESH PLUS     cetirizine 10 MG tablet  Commonly known as: ZYRTEC  TAKE 1 TABLET BY MOUTH DAILY     Creon 03522-92210 units delayed release capsule  Generic drug: lipase-protease-amylase  TAKE 1 CAPSULE BY MOUTH THREE TIMES DAILY WITH MEALS     docusate sodium 100 MG capsule  Commonly known as: COLACE  TAKE 1 CAPSULE BY MOUTH TWICE DAILY AS NEEDED FOR CONSTIPATION     ferrous sulfate 325 (65 Fe) MG EC tablet  Commonly known as: FE TABS 325  Take 1 tablet by mouth 2 times daily (with meals)     fluticasone 50 MCG/ACT nasal spray  Commonly known as: FLONASE  2 sprays by Nasal route daily     Handicap Placard Misc  by Does not apply route     Incontinence Brief Large Misc  To use as directed.  Use 3x a day     ipratropium-albuterol 0.5-2.5 (3) MG/3ML Soln nebulizer solution  Commonly known as: DUONEB     metoprolol succinate 50 MG extended release tablet  Commonly known as: TOPROL XL  TAKE 1 TABLET BY MOUTH AT BEDTIME     Multi-Vitamins Tabs  TAKE 1 TABLET BY MOUTH DAILY pantoprazole 40 MG tablet  Commonly known as: PROTONIX  TAKE 1 TABLET BY MOUTH DAILY     potassium chloride 20 MEQ extended release tablet  Commonly known as: KLOR-CON M  TAKE 1 TABLET BY MOUTH TWICE DAILY     rivaroxaban 20 MG Tabs tablet  Commonly known as: Xarelto  TAKE 1 TABLET BY MOUTH DAILY     rOPINIRole 0.25 MG tablet  Commonly known as: REQUIP  TAKE 1 TABLET BY MOUTH TWICE DAILY     SM Lubricant Eye Drops 0.4-0.3 % ophthalmic solution  Generic drug: polyethyl glycol-propyl glycol 0.4-0.3 %  PLACE 1 DROP INTO BOTH EYES FOUR TIMES DAILY AS NEEDED FOR DRY EYES     tamsulosin 0.4 MG capsule  Commonly known as: FLOMAX  Take 1 capsule by mouth daily     tiZANidine 4 MG tablet  Commonly known as: ZANAFLEX  TAKE 1 TABLET BY MOUTH DAILY AS NEEDED     [employer] Orthotics Mens Misc  Provide insurance covered orthotics shoes, wear daily     Ventolin  (90 Base) MCG/ACT inhaler  Generic drug: albuterol sulfate HFA     vitamin B-12 100 MCG tablet  Commonly known as: CYANOCOBALAMIN  TAKE 1 TABLET BY MOUTH DAILY AS NEEDED FOR FATIGUE        STOP taking these medications    gabapentin 300 MG capsule  Commonly known as: NEURONTIN     loratadine 10 MG tablet  Commonly known as: CLARITIN     Thera-Gesic 0.5-15 % Crea           Where to Get Your Medications      These medications were sent to [address] [place] [address]    Phone: [phone]   · bumetanide 1 MG tablet  · cetirizine 10 MG tablet  · Creon 48134-78786 units delayed release capsule  · diclofenac sodium 1 % Gel  · docusate sodium 100 MG capsule  · metoprolol succinate 50 MG extended release tablet  · rivaroxaban 20 MG Tabs tablet         No discharge procedures on file.     Time Spent on discharge is  31 mins in patient examination, evaluation, counseling as well as medication reconciliation, prescriptions for required medications, discharge plan and follow up.    Electronically signed by   Jemal Asencio MD  1/11/2022  7:10 PM      Thank you JULIAN Shen - PAVEL for the opportunity to be involved in this patient's care.

## 2022-01-10 NOTE — PROGRESS NOTES
The pt was discharged to home. The discharged instructions were given and reviewed with the pt and his wife. He was escorted to the exit per wheelchair in stable condition.

## 2022-01-10 NOTE — PLAN OF CARE
Problem: Falls - Risk of:  Goal: Will remain free from falls  Description: Will remain free from falls  Outcome: Ongoing  Goal: Absence of physical injury  Description: Absence of physical injury  Outcome: Ongoing     Problem: Discharge Planning:  Goal: Patients continuum of care needs are met  Description: Patients continuum of care needs are met  Outcome: Ongoing

## 2022-01-10 NOTE — PLAN OF CARE
Problem: Falls - Risk of:  Goal: Will remain free from falls  Description: Will remain free from falls  1/10/2022 1840 by Corey Sherman RN  Outcome: Completed  1/10/2022 1249 by Corey Sherman RN  Outcome: Ongoing  Goal: Absence of physical injury  Description: Absence of physical injury  1/10/2022 1840 by Corey Sherman RN  Outcome: Completed  1/10/2022 1249 by Corey Sherman RN  Outcome: Ongoing     Problem: Pain:  Description: Pain management should include both nonpharmacologic and pharmacologic interventions.   Goal: Pain level will decrease  Description: Pain level will decrease  Outcome: Completed  Goal: Control of acute pain  Description: Control of acute pain  Outcome: Completed  Goal: Control of chronic pain  Description: Control of chronic pain  Outcome: Completed  Goal: Patient's pain/discomfort is manageable  Description: Patient's pain/discomfort is manageable  Outcome: Completed     Problem: Infection:  Goal: Will remain free from infection  Description: Will remain free from infection  Outcome: Completed     Problem: Safety:  Goal: Free from accidental physical injury  Description: Free from accidental physical injury  Outcome: Completed  Goal: Free from intentional harm  Description: Free from intentional harm  Outcome: Completed     Problem: Daily Care:  Goal: Daily care needs are met  Description: Daily care needs are met  Outcome: Completed     Problem: Skin Integrity:  Goal: Skin integrity will stabilize  Description: Skin integrity will stabilize  Outcome: Completed     Problem: Discharge Planning:  Goal: Patients continuum of care needs are met  Description: Patients continuum of care needs are met  1/10/2022 1840 by Corey Sherman RN  Outcome: Completed  1/10/2022 1249 by Corey Sherman RN  Outcome: Ongoing

## 2022-01-10 NOTE — PROGRESS NOTES
[facility]  Office: [address], DO, Kristy Irvin, DO, Boubacar Villanueva, DO, Jose Burch, DO, Nataliya Hopkins MD, She Graham MD, Gilberto Nguyễn MD, Richard Jain MD, Sachin Burnett MD, Siria Elliott MD, Madelyn Figueroa MD, Daisy Ruth, DO, Deisi Banks, DO, Debby Williamson MD,  Ruslan Myers, DO, Patti Painting MD, Dianna Mar MD, Oza Hatchet, MD, Tamara Guajardo MD, Za Finley MD, Chevy Shukla MD, Prieto Darling MD, Zelda Schirmer, [facility], [facility], CNP, Mishel Parra, CNP, Yang Dean, CNS, Kaur Mayorga, CNP, Donavan Torres, CNP, Briseyda Rea, CNP, Dann Gutierrez, CNP, Sophie Guevara, CNP, Clifford Kendrick PA-C, Yajaira Hughes, [facility], Meri Smith, [facility], Annel Restrepo, CNP, Deborah Brandt, CNP, Marybeth Lopez, CNP, Marcus Regan, CNP, Abelardo Stoddard, CNP, [facility]    Progress Note    1/10/2022    3:56 PM    Name:   José Gil  MRN:     [id number]     Acct:      [de-identified]   Room:   2014/2014-02  IP Day:  3  Admit Date:  1/7/2022  4:49 AM    PCP:   JULIAN Macias CNP  Code Status:  Full Code    Subjective:     C/C:   Chief Complaint   Patient presents with   Harris Abdominal Pain     RLQ    Emesis     x2 days     Interval History Status:   Having bowel movements, passing flatus  Denies pain abdomen today  Tolerating full liquid diet    Brief History:   José Gil is a 77 y.o. Non- / non  male who presents with Abdominal Pain (RLQ) and Emesis (x2 days)   and is admitted to the hospital for the management of SBO (small bowel obstruction) ([facility] [address]    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 Patient reports she has dealt with these for many years. Patient reports that approximately 36 hours ago he developed right lower quadrant pain followed by severe nausea.   He reported that this resolved and improved temporarily and then returned approximately 12 hours ago significantly worse. Patient reports that his pain was at a 9/10 and was a cramping pain in nature. Patient was quickly followed by severe nausea and intractable vomiting. Patient reported to the emergency department where he was found to have findings consistent with small bowel obstruction. Patient reports that since this morning he has been incontinent of stool. Patient is scheduled to have a evaluation at a tertiary center in [place] with their gastroenterology team due to his recurrent abdominal problems. ER did consult general surgery at this facility and they are comfortable managing case care. Patient will be managed medically for the time being      Review of Systems:     Constitutional:  negative for chills, fevers, sweats  Respiratory:  negative for cough, dyspnea on exertion, shortness of breath, wheezing  Cardiovascular:  negative for chest pain, chest pressure/discomfort, lower extremity edema, palpitations  Gastrointestinal:  negative for abdominal pain, constipation, diarrhea, nausea, vomiting  Neurological:  negative for dizziness, headache    Medications: Allergies:     Allergies   Allergen Reactions    Darvocet [Propoxyphene N-Acetaminophen] Hives    Other Hives    Oxycodone-Acetaminophen     Propoxyphene      Other reaction(s): Unknown       Current Meds:   Scheduled Meds:    docusate sodium  100 mg Oral BID    rivaroxaban  20 mg Oral Daily    bumetanide  1 mg IntraVENous BID    lipase-protease-amylase  24,000 Units Oral TID     budesonide-formoterol  2 puff Inhalation BID    fluticasone  2 spray Nasal Daily    metoprolol succinate  50 mg Oral Daily    rOPINIRole  0.25 mg Oral BID    tamsulosin  0.4 mg Oral Daily    sodium chloride flush  5-40 mL IntraVENous 2 times per day    famotidine (PEPCID) injection  20 mg IntraVENous BID     Continuous Infusions:    sodium chloride      dextrose 5% in lactated ringers 50 mL/hr at 01/10/22 0536     PRN Meds: acetaminophen, diclofenac sodium, polyvinyl alcohol, sodium chloride flush, albuterol sulfate HFA, benzonatate, tiZANidine, sodium chloride flush, sodium chloride, potassium chloride **OR** potassium alternative oral replacement **OR** potassium chloride, magnesium sulfate, ondansetron **OR** ondansetron, polyethylene glycol, morphine    Data:     Past Medical History:   has a past medical history of Acute superficial gastritis without hemorrhage, Anastomotic stricture of stomach, Asthma, Atrial fibrillation ([facility] [address] Calculus of gallbladder without cholecystitis without obstruction, Chronic diastolic heart failure ([facility] [address] Chronic rhinosinusitis, Class 2 severe obesity due to excess calories with serious comorbidity and body mass index (BMI) of 36.0 to 36.9 in [facility]), COPD (chronic obstructive pulmonary disease) ([facility] [address] [facility]. [facility] E. coli UTI, Foot drop, right, Fracture of femur ([facility] [address] Gait disorder, Gastric out let obstruction, GERD (gastroesophageal reflux disease), Hallux valgus, acquired, bilateral, Hyperlipidemia, Hypertension, Impaired hearing, Internal hemorrhoids, Lymphedema of both lower extremities, Nausea & vomiting, OA (osteoarthritis) of knee, bilateral, Obesity, MARIAMA on CPAP, Osteoarthritis, Osteoarthritis of lumbar spine, S/P revision of total knee, Septicemia due to E. coli ([facility] [address] Sick sinus syndrome ([facility] [address] Simple chronic bronchitis ([facility] [address] Slow transit constipation, Unspecified sleep apnea, and UTI (urinary tract infection). Social History:   reports that he quit smoking about 45 years ago. He has a 20.00 pack-year smoking history. He has never used smokeless tobacco. He reports that he does not drink alcohol and does not use drugs.      Family History:   Family History   Problem Relation Age of Onset    Cancer Mother     Heart Attack Father        Vitals:  BP (!) 104/59   Pulse 70   Temp 98.9 °F (37.2 °C) Resp 18   Ht 5' 10\" (1.778 m)   Wt 199 lb 8 oz (90.5 kg)   SpO2 98%   BMI 28.63 kg/m²   Temp (24hrs), Av.6 °F (37 °C), Min:98.1 °F (36.7 °C), Max:99.9 °F (37.7 °C)    No results for input(s): POCGLU in the last 72 hours. I/O (24Hr): Intake/Output Summary (Last 24 hours) at 1/10/2022 1556  Last data filed at 1/10/2022 0542  Gross per 24 hour   Intake 1594.77 ml   Output 175 ml   Net 1419.77 ml       Labs:  Hematology:  Recent Labs     22  0647   WBC 4.6   RBC 3.13*   HGB 10.0*   HCT 32.2*   .9   MCH 31.9   MCHC 31.1   RDW 13.5   *   MPV 8.6   INR 1.4     Chemistry:  Recent Labs     22  0647      K 4.0      CO2 30   GLUCOSE 124*   BUN 15   CREATININE 0.78   ANIONGAP 7*   LABGLOM >60   GFRAA >60   CALCIUM 8.7     No results for input(s): PROT, LABALBU, LABA1C, Q9ULIJK, E8IFFVB, FT4, TSH, AST, ALT, LDH, GGT, ALKPHOS, LABGGT, BILITOT, BILIDIR, AMMONIA, AMYLASE, LIPASE, LACTATE, CHOL, HDL, LDLCHOLESTEROL, CHOLHDLRATIO, TRIG, VLDL, [id number], PHENYTOIN, PHENYF, URICACID, POCGLU in the last 72 hours. ABG:  Lab Results   Component Value Date    FIO2 NOT REPORTED 2021     Lab Results   Component Value Date/Time    SPECIAL NOT REPORTED 10/21/2021 10:46 PM     Lab Results   Component Value Date/Time    CULTURE NO SIGNIFICANT GROWTH 10/21/2021 10:46 PM       Radiology:  CT ABDOMEN PELVIS W IV CONTRAST Additional Contrast? None    Result Date: 2022  Findings of small-bowel obstruction with suspected transition point in the lower anterior abdomen on the right. There is evidence of prior gastric bypass surgery. Small amount of reactive free fluid is seen in the pelvis Cholelithiasis.   No cholecystitis Hyperdense nodule projecting posteriorly off the right kidney not clearly a simple cyst.  When the patient's acute symptoms have resolved, recommend follow-up abdominal CT or MRI with and without IV contrast, renal protocol to evaluate for any solid internal enhancement

## 2022-01-10 NOTE — PLAN OF CARE
Problem: Daily Care:  Goal: Daily care needs are met  Description: Daily care needs are met  Outcome: Ongoing     Problem: Pain:  Goal: Pain level will decrease  Description: Pain level will decrease  1/9/2022 2245 by Shaista Romero RN  Outcome: Ongoing    Problem: Falls - Risk of:  Goal: Will remain free from falls  Description: Will remain free from falls  1/9/2022 2052 by Shaista Romero RN  Outcome: Ongoing

## 2022-01-21 ENCOUNTER — VIRTUAL VISIT (OUTPATIENT)
Dept: PRIMARY CARE CLINIC | Age: 77
End: 2022-01-21
Payer: MEDICARE

## 2022-01-21 DIAGNOSIS — J44.1 COPD EXACERBATION (HCC): Primary | ICD-10-CM

## 2022-01-21 DIAGNOSIS — J06.9 UPPER RESPIRATORY TRACT INFECTION, UNSPECIFIED TYPE: ICD-10-CM

## 2022-01-21 PROCEDURE — 99213 OFFICE O/P EST LOW 20 MIN: CPT | Performed by: NURSE PRACTITIONER

## 2022-01-21 RX ORDER — AZITHROMYCIN 250 MG/1
250 TABLET, FILM COATED ORAL SEE ADMIN INSTRUCTIONS
Qty: 6 TABLET | Refills: 0 | Status: SHIPPED | OUTPATIENT
Start: 2022-01-21 | End: 2022-01-26

## 2022-01-21 ASSESSMENT — ENCOUNTER SYMPTOMS
DIARRHEA: 0
SHORTNESS OF BREATH: 0
NAUSEA: 0
COUGH: 1
SORE THROAT: 1
BLOOD IN STOOL: 0
RHINORRHEA: 1
VOMITING: 0
WHEEZING: 0
SINUS PAIN: 1

## 2022-01-21 NOTE — PROGRESS NOTES
Inga Oliva is a 77 y.o. male evaluated virtual visit via telephone on 2022. Consent:  He and/or health care decision maker is aware that that he may receive a bill for this telephone service, depending on his insurance coverage, and has provided verbal consent to proceed: Yes      Documentation:  I communicated with the patient and/or health care decision maker about cough, fatigue. Details of this discussion including any medical advice provided below      I affirm this is a Patient Initiated Episode with an Established Patient who has not had a related appointment within my department in the past 7 days or scheduled within the next 24 hours. Total Time: minutes: 11-20 minutes    Note: not billable if this call serves to triage the patient into an appointment for the relevant concern      JULIAN Claros CNP                  2022    TELEHEALTH EVALUATION -- Audio/Visual (During [facility]- public health emergency)    Patient and physician are located in their individual homes    HPI:    Inga Oliva (:  1945) has requested an audio only/video evaluation for the following concern(s):      Inga Oliva is concerned about cold symptoms. Started on   + productive cough with Brown sputum and fatigue. + chills, sore throat and post nasal drip  + congestion. Patient states his wife and daughter are sick with similar symptoms. No known COVID-19 exposure, currently unvaccinated. URI   This is a new problem. The current episode started in the past 7 days. The problem has been unchanged. There has been no fever. Associated symptoms include congestion, coughing, rhinorrhea, sinus pain and a sore throat. Pertinent negatives include no diarrhea, dysuria, nausea, vomiting or wheezing. Review of Systems   Constitutional: Positive for chills and fatigue. Negative for fever. HENT: Positive for congestion, rhinorrhea, sinus pain and sore throat.     Respiratory: (PROTONIX) 40 MG tablet TAKE 1 TABLET BY MOUTH DAILY Yes JULIAN Black CNP   tamsulosin (FLOMAX) 0.4 MG capsule Take 1 capsule by mouth daily Yes JULIAN Black CNP   Fluticasone furoate-vilanterol (BREO ELLIPTA) 200-25 MCG/INH AEPB inhaler INHALE 1 PUFF INTO THE LUNGS DAILY **RINSE MOUTH AFTER EACH USE** Yes Historical Provider, MD   Nutritional Supplements (BOOST) LIQD 1 can twice a day for meals Yes JULIAN Black CNP   albuterol sulfate HFA (VENTOLIN HFA) 108 (90 Base) MCG/ACT inhaler Inhale 2 puffs into the lungs every 6 hours as needed for Wheezing or Shortness of Breath Yes Historical Provider, MD   benzonatate (TESSALON) 100 MG capsule TAKE 1 CAPSULE BY MOUTH THREE TIMES DAILY AS NEEDED FOR COUGH Yes Maryjane Hardwick PA-C   vitamin B-12 (CYANOCOBALAMIN) 100 MCG tablet TAKE 1 TABLET BY MOUTH DAILY AS NEEDED FOR FATIGUE Yes Maryjane Hardwick PA-C   carboxymethylcellulose (REFRESH PLUS) 0.5 % SOLN ophthalmic solution Apply 1 drop to eye 4 times daily Yes Historical Provider, MD   ascorbic acid (VITAMIN C) 500 MG tablet Take 1 tablet by mouth daily Yes Maryjane Hardwick PA-C   ferrous sulfate (FE TABS 325) 325 (65 Fe) MG EC tablet Take 1 tablet by mouth 2 times daily (with meals) Yes Carlos Henderson PA-C   SM LUBRICANT EYE DROPS 0.4-0.3 % ophthalmic solution PLACE 1 DROP INTO BOTH EYES FOUR TIMES DAILY AS NEEDED FOR DRY EYES Yes Maryjane Hardwick PA-C   Handicap Placard MISC by Does not apply route Yes Maryjane Hardwick PA-C   Incontinence Supply Disposable (INCONTINENCE BRIEF LARGE) MISC To use as directed.  Use 3x a day Yes Maryjane Hardwick PA-C   Foot Care Products (TRI-BALANCE ORTHOTICS MENS) MISC Provide insurance covered orthotics shoes, wear daily Yes Maryjane Hardwick PA-C   ipratropium-albuterol (DUONEB) 0.5-2.5 (3) MG/3ML SOLN nebulizer solution Inhale 1 vial into the lungs every 4 hours as needed  Yes Historical Provider, MD   Armodafinil 200 MG TABS Take 1 tablet by mouth 2 times daily.   Yes Historical Provider, MD       Social History     Tobacco Use    Smoking status: Former Smoker     Packs/day: 1.00     Years: 20.00     Pack years: 20.00     Quit date: 1976     Years since quittin.1    Smokeless tobacco: Never Used   Vaping Use    Vaping Use: Never used   Substance Use Topics    Alcohol use: No    Drug use: No        Past Medical History:   Diagnosis Date    Acute superficial gastritis without hemorrhage 2018    Anastomotic stricture of stomach     Asthma     Atrial fibrillation ([address]     On Xarelto 2020    Calculus of gallbladder without cholecystitis without obstruction 2017    Chronic diastolic heart failure ([address] 2017    Chronic rhinosinusitis 2015    Class 2 severe obesity due to excess calories with serious comorbidity and body mass index (BMI) of 36.0 to 36.9 in adult ([address] 2017    COPD [facility] disease[facility]     E. coli septicemia ([facility])     E. coli UTI     Foot drop, right 07/10/2012    Fracture of femur ([address] 10/23/2016    Gait disorder 2016    Gastric out let obstruction     GERD (gastroesophageal reflux disease)     Hallux valgus, acquired, bilateral 2015    Hyperlipidemia     Hypertension     Impaired hearing 2015    Internal hemorrhoids 2017    Lymphedema of both lower extremities 2015    Nausea & vomiting 2018    OA (osteoarthritis) of knee, bilateral 2006    Obesity     MARIAMA on CPAP 2017    Osteoarthritis     Osteoarthritis of lumbar spine 2007     replace inactive diagnosis    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 coli ([address]     Due to UTI     Sick sinus syndrome (HCC)     Simple chronic bronchitis ([address] 04/10/2018    Slow transit constipation 2016    Unspecified sleep apnea     UTI (urinary tract infection)         Allergies   Allergen Reactions    Darvocet [Propoxyphene N-Acetaminophen] Hives    Other Hives    Oxycodone-Acetaminophen     Propoxyphene      Other reaction(s): Unknown   ,   Past Medical History:   Diagnosis Date    Acute superficial gastritis without hemorrhage 05/26/2018    Anastomotic stricture of stomach     Asthma     Atrial fibrillation ([address]     On Xarelto 6/27/2020    Calculus of gallbladder without cholecystitis without obstruction 05/02/2017    Chronic diastolic heart failure ([address] 11/17/2017    Chronic rhinosinusitis 04/13/2015    Class 2 severe obesity due to excess calories with serious comorbidity and body mass index (BMI) of 36.0 to 36.9 in adult [facility]) 11/17/2017    COPD (chronic obstructive pulmonary disease) ([address]     E. coli septicemia ([facility])     E. coli UTI     Foot drop, right 07/10/2012    Fracture of femur ([address] 10/23/2016    Gait disorder 07/20/2016    Gastric out let obstruction     GERD (gastroesophageal reflux disease)     Hallux valgus, acquired, bilateral 06/24/2015    Hyperlipidemia     Hypertension     Impaired hearing 04/13/2015    Internal hemorrhoids 01/03/2017    Lymphedema of both lower extremities 06/24/2015    Nausea & vomiting 11/16/2018    OA (osteoarthritis) of knee, bilateral 12/11/2006    Obesity     MARIAMA on CPAP 05/02/2017    Osteoarthritis     Osteoarthritis of lumbar spine 12/13/2007     replace inactive diagnosis    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 coli ([address]     Due to UTI     Sick sinus syndrome (HCC)     Simple chronic bronchitis ([address] 04/10/2018    Slow transit constipation 11/03/2016    Unspecified sleep apnea     UTI (urinary tract infection)    ,   Past Surgical History:   Procedure Laterality Date    CARPAL TUNNEL RELEASE      bilateral    COLONOSCOPY      COLONOSCOPY N/A 04/05/2021    COLONOSCOPY DIAGNOSTIC performed by Maribel Flower MD at [address] [facility]  01/02/2019    by Dr. Anais Simmons N/A 01/02/2019 CYSTOSCOPY performed by Noam Adler MD at [address]    first knuckle right index finger   [facility]    vertical banded gastroplasty   [address] REPLACEMENT   & 2005    bilateral knees    PACEMAKER INSERTION  2021    [place], placed by Dr. Darylene Holes EGD [address] [facility] [address] N/A 2018    EGD FOREIGN BODY REMOVAL performed by Vanessa Cano MD at [facility] EGD TRANSORAL BIOPSY SINGLE/MULTIPLE N/A 2018    EGD BIOPSY performed by Jesus Richardson DO at [address] [facility]      left shoulder    UPPER GASTROINTESTINAL ENDOSCOPY  2018    removal of food bolus    UPPER GASTROINTESTINAL ENDOSCOPY N/A 2018    EGD FOREIGN BODY REMOVAL performed by Jesus Richardson DO at [address] [place] [address] 2018    EGD BIOPSY performed by Jesus Richardson DO at [address]  2018    egd with balloon dilitation    UPPER GASTROINTESTINAL ENDOSCOPY  2018    EGD DILATION BALLOON performed by Vanessa Cano MD at [address] [place] [address] 10/01/2018    EGD BIOPSY performed by Alanna Carrion MD at [address] [place] [address]  10/01/2018    EGD DILATION BALLOON performed by Alanna Carrion MD at [address] [place] [address] N/A 2018    EGD DILATION BALLOON performed by Alanna Carrion MD at [address] [place] [address] N/A 10/15/2020    EGD SUBMUCOSAL/BOTOX INJECTION tattoo  performed by Ingrid Murray MD at [address] [place] [address] N/A 2021    EGD BIOPSY performed by Maribel Girard MD at [address] [place] [address]   Social History     Tobacco Use    Smoking status: Former Smoker     Packs/day: 1.00     Years: 20.00     Pack years: 20.00     Quit date: 1976     Years since quittin.1  Smokeless tobacco: Never Used   Vaping Use    Vaping Use: Never used   Substance Use Topics    Alcohol use: No    Drug use: No       CBC:  Lab Results   Component Value Date    WBC 4.6 01/08/2022    HGB 10.0 01/08/2022     01/08/2022     09/13/2011       BMP:    Lab Results   Component Value Date     01/08/2022    K 4.0 01/08/2022     01/08/2022    CO2 30 01/08/2022    BUN 15 01/08/2022    CREATININE 0.78 01/08/2022    GLUCOSE 124 01/08/2022    GLUCOSE 99 09/13/2011       HEMOGLOBIN A1C: No results found for: LABA1C    FASTING LIPID PANEL:  Lab Results   Component Value Date    CHOL 157 07/06/2018    HDL 72 04/01/2021    TRIG 101 07/06/2018         RECORD REVIEW: Previous medical records were reviewed at today's visit. PHYSICAL EXAMINATION:    Vital Signs: (As obtained by patient/caregiver at home)    No flowsheet data found. RECORD REVIEW: Previous medical records were reviewed at today's visit. The past family, medical and social histories were reviewed and unchanged with the exceptions of what is mentioned in this note. Due to this being a TeleHealth encounter, evaluation of the following organ systems is limited: Vitals/Constitutional/EENT/Resp/CV/GI//MS/Neuro/Skin/Heme-Lymph-Imm. ASSESSMENT/PLAN:  Kaye Calderon was seen today for cough and fatigue. Diagnoses and all orders for this visit:    COPD exacerbation ([facility]  -     azithromycin (ZITHROMAX) 250 MG tablet; Take 1 tablet by mouth See Admin Instructions for 5 days 500mg on day 1 followed by 250mg on days 2 - 5    Upper respiratory tract infection, unspecified type      71-year-old male with 6-day symptoms of viral upper respiratory infection likely inducing COPD exacerbation. Given reports of increase sputum production and change in sputum color, will treat with azithromycin. Reviewed most recent EKG, no evidence of QT widening. Discussed potential course of viral illness, can last up to 10 days. Potential for COVID-19 infection, patient plans to quarantine for 10 days from start of symptoms does not require testing to confirm diagnosis. No follow-ups on file. An  electronic signature was used to authenticate this note. --JULIAN Amos CNP on 1/21/2022 at 2:08 PM    This note is created with the assistance of a speech-recognition program. While intending to generate a document that actually reflects the content of the visit, the document can still have some mistakes which may not have been identified and corrected by editing. Wiley Liaoacacia, was evaluated through a synchronous (real-time) audio-video encounter. The patient (or guardian if applicable) is aware that this is a billable service, which includes applicable co-pays. This Virtual Visit was conducted with patient's (and/or legal guardian's) consent. The visit was conducted pursuant to the emergency declaration under the [address] [place] [address] authority and the [employer] and [employer] General Act. Patient identification was verified, and a caregiver was present when appropriate. The patient was located in a state where the provider was licensed to provide care. Services were provided through a video synchronous discussion virtually to substitute for in-person clinic visit.

## 2022-01-21 NOTE — PROGRESS NOTES
Visit Information    Have you changed or started any medications since your last visit including any over-the-counter medicines, vitamins, or herbal medicines? no   Are you having any side effects from any of your medications? -  no  Have you stopped taking any of your medications? Is so, why? -  no    Have you seen any other physician or provider since your last visit? No  Have you had any other diagnostic tests since your last visit? No  Have you been seen in the emergency room and/or had an admission to a hospital since we last saw you? No  Have you had your routine dental cleaning in the past 6 months? no    Have you activated your [employer] account? If not, what are your barriers?  Yes     Patient Care Team:  JULIAN Torre CNP as PCP - General (Family Medicine)  JULIAN Torre CNP as PCP - [address]  Gurjit Cuenca MD as Consulting Physician (Gastroenterology)  Kati Cohen MD as Consulting Physician (Cardiology)  Edwar Dejesus MD as Consulting Physician (Pulmonology)  Renea Walsh MD as Consulting Physician (Urology)    Medical History Review  Past Medical, Family, and Social History reviewed and does not contribute to the patient presenting condition    Health Maintenance   Topic Date Due    COVID-19 Vaccine (1) Never done    Shingles Vaccine (1 of 2) Never done    Flu vaccine (1) 06/30/2022 (Originally 9/1/2021)    Depression Screen  10/21/2022    Annual Wellness Visit (AWV)  10/22/2022    Potassium monitoring  01/08/2023    Creatinine monitoring  01/08/2023    Lipid screen  04/01/2026    DTaP/Tdap/Td vaccine (2 - Td or Tdap) 04/22/2029    Colon cancer screen colonoscopy  04/05/2031    Pneumococcal 65+ years Vaccine  Completed    AAA screen  Completed    Hepatitis C screen  Completed    Hepatitis A vaccine  Aged Out    Hepatitis B vaccine  Aged Out    Hib vaccine  Aged Out    Meningococcal (ACWY) vaccine  Aged Out

## 2022-01-25 DIAGNOSIS — R53.83 FATIGUE, UNSPECIFIED TYPE: ICD-10-CM

## 2022-01-25 DIAGNOSIS — R09.81 SINUS CONGESTION: ICD-10-CM

## 2022-01-25 NOTE — TELEPHONE ENCOUNTER
Health Maintenance   Topic Date Due    COVID-19 Vaccine (1) Never done    Shingles Vaccine (1 of 2) Never done    Flu vaccine (1) 06/30/2022 (Originally 9/1/2021)    Depression Screen  10/21/2022    Annual Wellness Visit (AWV)  10/22/2022    Potassium monitoring  01/08/2023    Creatinine monitoring  01/08/2023    Lipid screen  04/01/2026    DTaP/Tdap/Td vaccine (2 - Td or Tdap) 04/22/2029    Colon cancer screen colonoscopy  04/05/2031    Pneumococcal 65+ years Vaccine  Completed    AAA screen  Completed    Hepatitis C screen  Completed    Hepatitis A vaccine  Aged Out    Hepatitis B vaccine  Aged Out    Hib vaccine  Aged Out    Meningococcal (ACWY) vaccine  Aged Out             (applicable per patient's age: Cancer Screenings, Depression Screening, Fall Risk Screening, Immunizations)    LDL Cholesterol (mg/dL)   Date Value   04/01/2021 49     LDL Calculated (mg/dL)   Date Value   10/30/2013 67     AST (U/L)   Date Value   01/07/2022 30     ALT (U/L)   Date Value   01/07/2022 12     BUN (mg/dL)   Date Value   01/08/2022 15      (goal A1C is < 7)   (goal LDL is <100) need 30-50% reduction from baseline     BP Readings from Last 3 Encounters:   01/10/22 (!) 104/59   11/10/21 114/69   10/21/21 102/65    (goal /80)      All Future Testing planned in [facility]:  Lab Frequency Next Occurrence   Basic Metabolic Panel Once 84/68/3982       Next Visit Date:  Future Appointments   Date Time Provider Jeri Flynn   2/14/2022  1:30 PM JULIAN Mckenna - CNP [facility]            Patient Active Problem List:     Chronic atrial fibrillation (HCC)     Dyslipidemia     Gastroesophageal reflux disease without esophagitis     Osteoarthritis of lumbar spine     OA (osteoarthritis) of knee, bilateral     Foot drop, right     Hallux valgus, acquired, bilateral     Hearing impairment     Essential hypertension     Slow transit constipation     MARIAMA on CPAP     Simple chronic bronchitis ([address] Non-intractable vomiting     Hospital-acquired bacterial pneumonia     Pneumonia due to organism     COPD (chronic obstructive pulmonary disease) (HCC)     Chronic diastolic heart failure (HCC)     History of bariatric surgery including banding leading to esophageal stricture and aspiration     BPH (benign prostatic hyperplasia)     Myalgia     Atrial fibrillation with slow ventricular response (HCC)     Pacemaker pocket hematoma, initial encounter     Pneumonia-community-acquired     Acute hypoxemic respiratory failure (HCC)     SIRS (systemic inflammatory response syndrome) ([facility]     Pulmonary hypertension (HCC)     Presence of permanent cardiac pacemaker     Aspiration pneumonia (HCC)     SBO (small bowel obstruction) ([address]

## 2022-01-25 NOTE — TELEPHONE ENCOUNTER
Health Maintenance   Topic Date Due    COVID-19 Vaccine (1) Never done    Shingles Vaccine (1 of 2) Never done    Flu vaccine (1) 06/30/2022 (Originally 9/1/2021)    Depression Screen  10/21/2022    Annual Wellness Visit (AWV)  10/22/2022    Potassium monitoring  01/08/2023    Creatinine monitoring  01/08/2023    Lipid screen  04/01/2026    DTaP/Tdap/Td vaccine (2 - Td or Tdap) 04/22/2029    Colon cancer screen colonoscopy  04/05/2031    Pneumococcal 65+ years Vaccine  Completed    AAA screen  Completed    Hepatitis C screen  Completed    Hepatitis A vaccine  Aged Out    Hepatitis B vaccine  Aged Out    Hib vaccine  Aged Out    Meningococcal (ACWY) vaccine  Aged Out             (applicable per patient's age: Cancer Screenings, Depression Screening, Fall Risk Screening, Immunizations)    LDL Cholesterol (mg/dL)   Date Value   04/01/2021 49     LDL Calculated (mg/dL)   Date Value   10/30/2013 67     AST (U/L)   Date Value   01/07/2022 30     ALT (U/L)   Date Value   01/07/2022 12     BUN (mg/dL)   Date Value   01/08/2022 15      (goal A1C is < 7)   (goal LDL is <100) need 30-50% reduction from baseline     BP Readings from Last 3 Encounters:   01/10/22 (!) 104/59   11/10/21 114/69   10/21/21 102/65    (goal /80)      All Future Testing planned in [facility]:  Lab Frequency Next Occurrence   Basic Metabolic Panel Once 82/07/1446       Next Visit Date:  Future Appointments   Date Time Provider Jeri Flynn   2/14/2022  1:30 PM JULIAN Smith CNP [facility]            Patient Active Problem List:     Chronic atrial fibrillation (HCC)     Dyslipidemia     Gastroesophageal reflux disease without esophagitis     Osteoarthritis of lumbar spine     OA (osteoarthritis) of knee, bilateral     Foot drop, right     Hallux valgus, acquired, bilateral     Hearing impairment     Essential hypertension     Slow transit constipation     MARIAMA on CPAP     Simple chronic bronchitis ([address] Non-intractable vomiting     Hospital-acquired bacterial pneumonia     Pneumonia due to organism     COPD (chronic obstructive pulmonary disease) (HCC)     Chronic diastolic heart failure (HCC)     History of bariatric surgery including banding leading to esophageal stricture and aspiration     BPH (benign prostatic hyperplasia)     Myalgia     Atrial fibrillation with slow ventricular response (HCC)     Pacemaker pocket hematoma, initial encounter     Pneumonia-community-acquired     Acute hypoxemic respiratory failure (HCC)     SIRS (systemic inflammatory response syndrome) ([facility]     Pulmonary hypertension (HCC)     Presence of permanent cardiac pacemaker     Aspiration pneumonia (HCC)     SBO (small bowel obstruction) ([address]

## 2022-01-26 RX ORDER — UBIDECARENONE 75 MG
CAPSULE ORAL
Qty: 30 TABLET | Refills: 5 | Status: SHIPPED | OUTPATIENT
Start: 2022-01-26

## 2022-01-26 RX ORDER — BENZONATATE 100 MG/1
CAPSULE ORAL
Qty: 30 CAPSULE | Refills: 3 | Status: ON HOLD
Start: 2022-01-26 | End: 2022-02-08 | Stop reason: HOSPADM

## 2022-01-28 ENCOUNTER — APPOINTMENT (OUTPATIENT)
Dept: GENERAL RADIOLOGY | Age: 77
DRG: 463 | End: 2022-01-28
Payer: MEDICARE

## 2022-01-28 ENCOUNTER — APPOINTMENT (OUTPATIENT)
Dept: CT IMAGING | Age: 77
DRG: 463 | End: 2022-01-28
Payer: MEDICARE

## 2022-01-28 ENCOUNTER — HOSPITAL ENCOUNTER (INPATIENT)
Age: 77
LOS: 14 days | Discharge: NURSING, REHAB OR LONG-TERM CARE | DRG: 463 | End: 2022-02-12
Attending: EMERGENCY MEDICINE | Admitting: SURGERY
Payer: MEDICARE

## 2022-01-28 DIAGNOSIS — S72.002A LEFT DISPLACED FEMORAL NECK FRACTURE (HCC): Primary | ICD-10-CM

## 2022-01-28 LAB
ALLEN TEST: ABNORMAL
ANION GAP SERPL CALCULATED.3IONS-SCNC: 10 MMOL/L (ref 9–17)
BLOOD BANK SPECIMEN: ABNORMAL
BUN BLDV-MCNC: 20 MG/DL (ref 8–23)
CARBOXYHEMOGLOBIN: 2.2 % (ref 0–5)
CHLORIDE BLD-SCNC: 100 MMOL/L (ref 98–107)
CO2: 26 MMOL/L (ref 20–31)
CREAT SERPL-MCNC: 0.9 MG/DL (ref 0.7–1.2)
ETHANOL PERCENT: <0.01 %
ETHANOL: <10 MG/DL
FIO2: ABNORMAL
GFR AFRICAN AMERICAN: >60 ML/MIN
GFR NON-AFRICAN AMERICAN: >60 ML/MIN
GFR SERPL CREATININE-BSD FRML MDRD: ABNORMAL ML/MIN/{1.73_M2}
GFR SERPL CREATININE-BSD FRML MDRD: ABNORMAL ML/MIN/{1.73_M2}
GLUCOSE BLD-MCNC: 92 MG/DL (ref 70–99)
HCG QUALITATIVE: ABNORMAL
HCO3 VENOUS: 29 MMOL/L (ref 24–30)
HCT VFR BLD CALC: 34.4 % (ref 40.7–50.3)
HEMOGLOBIN: 11.3 G/DL (ref 13–17)
INR BLD: 1.5
MCH RBC QN AUTO: 31.3 PG (ref 25.2–33.5)
MCHC RBC AUTO-ENTMCNC: 32.8 G/DL (ref 28.4–34.8)
MCV RBC AUTO: 95.3 FL (ref 82.6–102.9)
METHEMOGLOBIN: ABNORMAL % (ref 0–1.5)
MODE: ABNORMAL
NEGATIVE BASE EXCESS, VEN: ABNORMAL MMOL/L (ref 0–2)
NOTIFICATION TIME: ABNORMAL
NOTIFICATION: ABNORMAL
NRBC AUTOMATED: 0 PER 100 WBC
O2 DEVICE/FLOW/%: ABNORMAL
O2 SAT, VEN: 45.5 % (ref 60–85)
OXYHEMOGLOBIN: ABNORMAL % (ref 95–98)
PARTIAL THROMBOPLASTIN TIME: 31.6 SEC (ref 20.5–30.5)
PATIENT TEMP: 37
PCO2, VEN, TEMP ADJ: ABNORMAL MMHG (ref 39–55)
PCO2, VEN: 48.5 (ref 39–55)
PDW BLD-RTO: 12.8 % (ref 11.8–14.4)
PEEP/CPAP: ABNORMAL
PH VENOUS: 7.39 (ref 7.32–7.42)
PH, VEN, TEMP ADJ: ABNORMAL (ref 7.32–7.42)
PLATELET # BLD: 247 K/UL (ref 138–453)
PMV BLD AUTO: 9.3 FL (ref 8.1–13.5)
PO2, VEN, TEMP ADJ: ABNORMAL MMHG (ref 30–50)
PO2, VEN: 26.1 (ref 30–50)
POSITIVE BASE EXCESS, VEN: 3.9 MMOL/L (ref 0–2)
POTASSIUM SERPL-SCNC: 4 MMOL/L (ref 3.7–5.3)
PROTHROMBIN TIME: 15.9 SEC (ref 9.1–12.3)
PSV: ABNORMAL
PT. POSITION: ABNORMAL
RBC # BLD: 3.61 M/UL (ref 4.21–5.77)
RESPIRATORY RATE: ABNORMAL
SAMPLE SITE: ABNORMAL
SET RATE: ABNORMAL
SODIUM BLD-SCNC: 136 MMOL/L (ref 135–144)
TEXT FOR RESPIRATORY: ABNORMAL
TOTAL HB: ABNORMAL G/DL (ref 12–16)
TOTAL RATE: ABNORMAL
VT: ABNORMAL
WBC # BLD: 6.3 K/UL (ref 3.5–11.3)

## 2022-01-28 PROCEDURE — 73523 X-RAY EXAM HIPS BI 5/> VIEWS: CPT

## 2022-01-28 PROCEDURE — 2580000003 HC RX 258: Performed by: STUDENT IN AN ORGANIZED HEALTH CARE EDUCATION/TRAINING PROGRAM

## 2022-01-28 PROCEDURE — 99284 EMERGENCY DEPT VISIT MOD MDM: CPT

## 2022-01-28 PROCEDURE — 6360000002 HC RX W HCPCS: Performed by: STUDENT IN AN ORGANIZED HEALTH CARE EDUCATION/TRAINING PROGRAM

## 2022-01-28 PROCEDURE — 73552 X-RAY EXAM OF FEMUR 2/>: CPT

## 2022-01-28 PROCEDURE — 82805 BLOOD GASES W/O2 SATURATION: CPT

## 2022-01-28 PROCEDURE — 85027 COMPLETE CBC AUTOMATED: CPT

## 2022-01-28 PROCEDURE — 85610 PROTHROMBIN TIME: CPT

## 2022-01-28 PROCEDURE — G0480 DRUG TEST DEF 1-7 CLASSES: HCPCS

## 2022-01-28 PROCEDURE — 82565 ASSAY OF CREATININE: CPT

## 2022-01-28 PROCEDURE — 82306 VITAMIN D 25 HYDROXY: CPT

## 2022-01-28 PROCEDURE — 84703 CHORIONIC GONADOTROPIN ASSAY: CPT

## 2022-01-28 PROCEDURE — 82947 ASSAY GLUCOSE BLOOD QUANT: CPT

## 2022-01-28 PROCEDURE — 85730 THROMBOPLASTIN TIME PARTIAL: CPT

## 2022-01-28 PROCEDURE — 96374 THER/PROPH/DIAG INJ IV PUSH: CPT

## 2022-01-28 PROCEDURE — 73562 X-RAY EXAM OF KNEE 3: CPT

## 2022-01-28 PROCEDURE — 80051 ELECTROLYTE PANEL: CPT

## 2022-01-28 PROCEDURE — 84520 ASSAY OF UREA NITROGEN: CPT

## 2022-01-28 PROCEDURE — 96375 TX/PRO/DX INJ NEW DRUG ADDON: CPT

## 2022-01-28 PROCEDURE — 93005 ELECTROCARDIOGRAM TRACING: CPT | Performed by: STUDENT IN AN ORGANIZED HEALTH CARE EDUCATION/TRAINING PROGRAM

## 2022-01-28 RX ORDER — 0.9 % SODIUM CHLORIDE 0.9 %
500 INTRAVENOUS SOLUTION INTRAVENOUS ONCE
Status: COMPLETED | OUTPATIENT
Start: 2022-01-28 | End: 2022-01-29

## 2022-01-28 RX ORDER — ORPHENADRINE CITRATE 30 MG/ML
60 INJECTION INTRAMUSCULAR; INTRAVENOUS ONCE
Status: COMPLETED | OUTPATIENT
Start: 2022-01-28 | End: 2022-01-28

## 2022-01-28 RX ORDER — FENTANYL CITRATE 50 UG/ML
25 INJECTION, SOLUTION INTRAMUSCULAR; INTRAVENOUS ONCE
Status: COMPLETED | OUTPATIENT
Start: 2022-01-28 | End: 2022-01-28

## 2022-01-28 RX ADMIN — FENTANYL CITRATE 25 MCG: 50 INJECTION, SOLUTION INTRAMUSCULAR; INTRAVENOUS at 22:30

## 2022-01-28 RX ADMIN — SODIUM CHLORIDE 500 ML: 0.9 INJECTION, SOLUTION INTRAVENOUS at 23:35

## 2022-01-28 RX ADMIN — ORPHENADRINE CITRATE 60 MG: 60 INJECTION INTRAMUSCULAR; INTRAVENOUS at 23:38

## 2022-01-28 ASSESSMENT — ENCOUNTER SYMPTOMS
SHORTNESS OF BREATH: 0
ABDOMINAL PAIN: 0
NAUSEA: 0
SORE THROAT: 0
SINUS PAIN: 0
COUGH: 0
EYE PAIN: 0
VOMITING: 0
DIARRHEA: 0

## 2022-01-28 ASSESSMENT — PAIN SCALES - GENERAL: PAINLEVEL_OUTOF10: 10

## 2022-01-29 ENCOUNTER — ANESTHESIA EVENT (OUTPATIENT)
Dept: OPERATING ROOM | Age: 77
DRG: 463 | End: 2022-01-29
Payer: MEDICARE

## 2022-01-29 ENCOUNTER — APPOINTMENT (OUTPATIENT)
Dept: CT IMAGING | Age: 77
DRG: 463 | End: 2022-01-29
Payer: MEDICARE

## 2022-01-29 ENCOUNTER — ANESTHESIA (OUTPATIENT)
Dept: OPERATING ROOM | Age: 77
DRG: 463 | End: 2022-01-29
Payer: MEDICARE

## 2022-01-29 ENCOUNTER — APPOINTMENT (OUTPATIENT)
Dept: GENERAL RADIOLOGY | Age: 77
DRG: 463 | End: 2022-01-29
Payer: MEDICARE
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
## 2022-01-29 LAB
ABSOLUTE EOS #: 0.07 K/UL (ref 0–0.44)
ABSOLUTE IMMATURE GRANULOCYTE: 0.07 K/UL (ref 0–0.3)
ABSOLUTE LYMPH #: 0.56 K/UL (ref 1.1–3.7)
ABSOLUTE MONO #: 0.42 K/UL (ref 0.1–1.2)
ANION GAP SERPL CALCULATED.3IONS-SCNC: 11 MMOL/L (ref 9–17)
BASOPHILS # BLD: 0 % (ref 0–2)
BASOPHILS ABSOLUTE: 0 K/UL (ref 0–0.2)
BUN BLDV-MCNC: 17 MG/DL (ref 8–23)
BUN/CREAT BLD: ABNORMAL (ref 9–20)
C-REACTIVE PROTEIN: 15.4 MG/L (ref 0–5)
CALCIUM SERPL-MCNC: 8.7 MG/DL (ref 8.6–10.4)
CHLORIDE BLD-SCNC: 102 MMOL/L (ref 98–107)
CO2: 24 MMOL/L (ref 20–31)
CREAT SERPL-MCNC: 0.81 MG/DL (ref 0.7–1.2)
DIFFERENTIAL TYPE: ABNORMAL
EKG ATRIAL RATE: 36 BPM
EKG Q-T INTERVAL: 522 MS
EKG QRS DURATION: 172 MS
EKG QTC CALCULATION (BAZETT): 563 MS
EKG R AXIS: -88 DEGREES
EKG T AXIS: 68 DEGREES
EKG VENTRICULAR RATE: 70 BPM
EOSINOPHILS RELATIVE PERCENT: 1 % (ref 1–4)
FERRITIN: 274 UG/L (ref 30–400)
GFR AFRICAN AMERICAN: >60 ML/MIN
GFR NON-AFRICAN AMERICAN: >60 ML/MIN
GFR SERPL CREATININE-BSD FRML MDRD: ABNORMAL ML/MIN/{1.73_M2}
GFR SERPL CREATININE-BSD FRML MDRD: ABNORMAL ML/MIN/{1.73_M2}
GLUCOSE BLD-MCNC: 101 MG/DL (ref 70–99)
HCT VFR BLD CALC: 33.9 % (ref 40.7–50.3)
HEMOGLOBIN: 11.1 G/DL (ref 13–17)
IMMATURE GRANULOCYTES: 1 %
LV EF: 50 %
LVEF MODALITY: NORMAL
LYMPHOCYTES # BLD: 8 % (ref 24–43)
MCH RBC QN AUTO: 31.6 PG (ref 25.2–33.5)
MCHC RBC AUTO-ENTMCNC: 32.7 G/DL (ref 28.4–34.8)
MCV RBC AUTO: 96.6 FL (ref 82.6–102.9)
MONOCYTES # BLD: 6 % (ref 3–12)
MORPHOLOGY: NORMAL
NRBC AUTOMATED: 0 PER 100 WBC
PDW BLD-RTO: 12.9 % (ref 11.8–14.4)
PLATELET # BLD: 198 K/UL (ref 138–453)
PLATELET ESTIMATE: ABNORMAL
PMV BLD AUTO: 9.3 FL (ref 8.1–13.5)
POTASSIUM SERPL-SCNC: 3.9 MMOL/L (ref 3.7–5.3)
RBC # BLD: 3.51 M/UL (ref 4.21–5.77)
RBC # BLD: ABNORMAL 10*6/UL
SARS-COV-2, RAPID: DETECTED
SEG NEUTROPHILS: 84 % (ref 36–65)
SEGMENTED NEUTROPHILS ABSOLUTE COUNT: 5.88 K/UL (ref 1.5–8.1)
SODIUM BLD-SCNC: 137 MMOL/L (ref 135–144)
SPECIMEN DESCRIPTION: ABNORMAL
VITAMIN D 25-HYDROXY: 47.1 NG/ML (ref 30–100)
WBC # BLD: 7 K/UL (ref 3.5–11.3)
WBC # BLD: ABNORMAL 10*3/UL

## 2022-01-29 PROCEDURE — 93010 ELECTROCARDIOGRAM REPORT: CPT | Performed by: INTERNAL MEDICINE

## 2022-01-29 PROCEDURE — 93306 TTE W/DOPPLER COMPLETE: CPT

## 2022-01-29 PROCEDURE — 1200000000 HC SEMI PRIVATE

## 2022-01-29 PROCEDURE — 3209999900 CT THORACIC SPINE TRAUMA RECONSTRUCTION

## 2022-01-29 PROCEDURE — XW033H6 INTRODUCTION OF OTHER NEW TECHNOLOGY MONOCLONAL ANTIBODY INTO PERIPHERAL VEIN, PERCUTANEOUS APPROACH, NEW TECHNOLOGY GROUP 6: ICD-10-PCS | Performed by: INTERNAL MEDICINE

## 2022-01-29 PROCEDURE — 87635 SARS-COV-2 COVID-19 AMP PRB: CPT

## 2022-01-29 PROCEDURE — 80048 BASIC METABOLIC PNL TOTAL CA: CPT

## 2022-01-29 PROCEDURE — 73610 X-RAY EXAM OF ANKLE: CPT

## 2022-01-29 PROCEDURE — 6370000000 HC RX 637 (ALT 250 FOR IP): Performed by: STUDENT IN AN ORGANIZED HEALTH CARE EDUCATION/TRAINING PROGRAM

## 2022-01-29 PROCEDURE — 94761 N-INVAS EAR/PLS OXIMETRY MLT: CPT

## 2022-01-29 PROCEDURE — 6360000002 HC RX W HCPCS: Performed by: INTERNAL MEDICINE

## 2022-01-29 PROCEDURE — 85025 COMPLETE CBC W/AUTO DIFF WBC: CPT

## 2022-01-29 PROCEDURE — 6360000004 HC RX CONTRAST MEDICATION: Performed by: STUDENT IN AN ORGANIZED HEALTH CARE EDUCATION/TRAINING PROGRAM

## 2022-01-29 PROCEDURE — 86140 C-REACTIVE PROTEIN: CPT

## 2022-01-29 PROCEDURE — 36415 COLL VENOUS BLD VENIPUNCTURE: CPT

## 2022-01-29 PROCEDURE — 2580000003 HC RX 258: Performed by: STUDENT IN AN ORGANIZED HEALTH CARE EDUCATION/TRAINING PROGRAM

## 2022-01-29 PROCEDURE — 3209999900 CT LUMBAR SPINE TRAUMA RECONSTRUCTION

## 2022-01-29 PROCEDURE — 99222 1ST HOSP IP/OBS MODERATE 55: CPT | Performed by: INTERNAL MEDICINE

## 2022-01-29 PROCEDURE — 73030 X-RAY EXAM OF SHOULDER: CPT

## 2022-01-29 PROCEDURE — 70450 CT HEAD/BRAIN W/O DYE: CPT

## 2022-01-29 PROCEDURE — 72125 CT NECK SPINE W/O DYE: CPT

## 2022-01-29 PROCEDURE — 6360000002 HC RX W HCPCS

## 2022-01-29 PROCEDURE — 82728 ASSAY OF FERRITIN: CPT

## 2022-01-29 PROCEDURE — 71260 CT THORAX DX C+: CPT

## 2022-01-29 PROCEDURE — 6360000002 HC RX W HCPCS: Performed by: STUDENT IN AN ORGANIZED HEALTH CARE EDUCATION/TRAINING PROGRAM

## 2022-01-29 PROCEDURE — 71045 X-RAY EXAM CHEST 1 VIEW: CPT

## 2022-01-29 RX ORDER — SODIUM CHLORIDE, SODIUM LACTATE, POTASSIUM CHLORIDE, CALCIUM CHLORIDE 600; 310; 30; 20 MG/100ML; MG/100ML; MG/100ML; MG/100ML
INJECTION, SOLUTION INTRAVENOUS CONTINUOUS
Status: DISCONTINUED | OUTPATIENT
Start: 2022-01-29 | End: 2022-01-29

## 2022-01-29 RX ORDER — SODIUM CHLORIDE 0.9 % (FLUSH) 0.9 %
5-40 SYRINGE (ML) INJECTION PRN
Status: DISCONTINUED | OUTPATIENT
Start: 2022-01-29 | End: 2022-02-12 | Stop reason: HOSPADM

## 2022-01-29 RX ORDER — TAMSULOSIN HYDROCHLORIDE 0.4 MG/1
0.4 CAPSULE ORAL DAILY
Status: DISCONTINUED | OUTPATIENT
Start: 2022-01-29 | End: 2022-02-12 | Stop reason: HOSPADM

## 2022-01-29 RX ORDER — GABAPENTIN 300 MG/1
300 CAPSULE ORAL EVERY 8 HOURS
Status: DISCONTINUED | OUTPATIENT
Start: 2022-01-29 | End: 2022-02-09

## 2022-01-29 RX ORDER — CARBOXYMETHYLCELLULOSE SODIUM 10 MG/ML
1 GEL OPHTHALMIC 3 TIMES DAILY PRN
Status: DISCONTINUED | OUTPATIENT
Start: 2022-01-29 | End: 2022-02-12 | Stop reason: HOSPADM

## 2022-01-29 RX ORDER — ROPINIROLE 0.25 MG/1
0.25 TABLET, FILM COATED ORAL 2 TIMES DAILY
Status: DISCONTINUED | OUTPATIENT
Start: 2022-01-29 | End: 2022-02-12 | Stop reason: HOSPADM

## 2022-01-29 RX ORDER — ONDANSETRON 2 MG/ML
4 INJECTION INTRAMUSCULAR; INTRAVENOUS EVERY 6 HOURS PRN
Status: DISCONTINUED | OUTPATIENT
Start: 2022-01-29 | End: 2022-02-03

## 2022-01-29 RX ORDER — METOPROLOL SUCCINATE 50 MG/1
50 TABLET, EXTENDED RELEASE ORAL NIGHTLY
Status: DISCONTINUED | OUTPATIENT
Start: 2022-01-29 | End: 2022-02-03

## 2022-01-29 RX ORDER — ALBUTEROL SULFATE 90 UG/1
2 AEROSOL, METERED RESPIRATORY (INHALATION) EVERY 6 HOURS PRN
Status: DISCONTINUED | OUTPATIENT
Start: 2022-01-29 | End: 2022-02-12 | Stop reason: HOSPADM

## 2022-01-29 RX ORDER — LANOLIN ALCOHOL/MO/W.PET/CERES
325 CREAM (GRAM) TOPICAL EVERY OTHER DAY
Status: DISCONTINUED | OUTPATIENT
Start: 2022-01-29 | End: 2022-02-12 | Stop reason: HOSPADM

## 2022-01-29 RX ORDER — ONDANSETRON 2 MG/ML
4 INJECTION INTRAMUSCULAR; INTRAVENOUS ONCE
Status: COMPLETED | OUTPATIENT
Start: 2022-01-29 | End: 2022-01-29

## 2022-01-29 RX ORDER — IBUPROFEN 400 MG/1
400 TABLET ORAL EVERY 4 HOURS
Status: DISCONTINUED | OUTPATIENT
Start: 2022-01-29 | End: 2022-01-31

## 2022-01-29 RX ORDER — METHOCARBAMOL 750 MG/1
750 TABLET, FILM COATED ORAL EVERY 6 HOURS
Status: DISCONTINUED | OUTPATIENT
Start: 2022-01-29 | End: 2022-02-09

## 2022-01-29 RX ORDER — MINERAL OIL AND WHITE PETROLATUM 150; 830 MG/G; MG/G
OINTMENT OPHTHALMIC PRN
Status: DISCONTINUED | OUTPATIENT
Start: 2022-01-29 | End: 2022-02-12 | Stop reason: HOSPADM

## 2022-01-29 RX ORDER — FENTANYL CITRATE 50 UG/ML
25 INJECTION, SOLUTION INTRAMUSCULAR; INTRAVENOUS ONCE
Status: COMPLETED | OUTPATIENT
Start: 2022-01-29 | End: 2022-01-29

## 2022-01-29 RX ORDER — SODIUM CHLORIDE 9 MG/ML
25 INJECTION, SOLUTION INTRAVENOUS PRN
Status: DISCONTINUED | OUTPATIENT
Start: 2022-01-29 | End: 2022-02-03

## 2022-01-29 RX ORDER — ONDANSETRON 2 MG/ML
INJECTION INTRAMUSCULAR; INTRAVENOUS
Status: COMPLETED
Start: 2022-01-29 | End: 2022-01-29

## 2022-01-29 RX ORDER — PANTOPRAZOLE SODIUM 40 MG/1
40 TABLET, DELAYED RELEASE ORAL
Status: DISCONTINUED | OUTPATIENT
Start: 2022-01-30 | End: 2022-02-12 | Stop reason: HOSPADM

## 2022-01-29 RX ORDER — IPRATROPIUM BROMIDE AND ALBUTEROL SULFATE 2.5; .5 MG/3ML; MG/3ML
1 SOLUTION RESPIRATORY (INHALATION) EVERY 4 HOURS PRN
Status: DISCONTINUED | OUTPATIENT
Start: 2022-01-29 | End: 2022-02-12 | Stop reason: HOSPADM

## 2022-01-29 RX ORDER — BUMETANIDE 1 MG/1
2 TABLET ORAL DAILY
Status: DISCONTINUED | OUTPATIENT
Start: 2022-01-30 | End: 2022-02-12 | Stop reason: HOSPADM

## 2022-01-29 RX ORDER — POLYETHYLENE GLYCOL 3350 17 G/17G
17 POWDER, FOR SOLUTION ORAL DAILY
Status: DISCONTINUED | OUTPATIENT
Start: 2022-01-29 | End: 2022-02-02

## 2022-01-29 RX ORDER — MODAFINIL 100 MG/1
100 TABLET ORAL 2 TIMES DAILY PRN
Status: DISCONTINUED | OUTPATIENT
Start: 2022-01-29 | End: 2022-02-12 | Stop reason: HOSPADM

## 2022-01-29 RX ORDER — ONDANSETRON 4 MG/1
4 TABLET, ORALLY DISINTEGRATING ORAL EVERY 8 HOURS PRN
Status: DISCONTINUED | OUTPATIENT
Start: 2022-01-29 | End: 2022-02-03

## 2022-01-29 RX ORDER — SODIUM CHLORIDE 0.9 % (FLUSH) 0.9 %
5-40 SYRINGE (ML) INJECTION EVERY 12 HOURS SCHEDULED
Status: DISCONTINUED | OUTPATIENT
Start: 2022-01-29 | End: 2022-02-12 | Stop reason: HOSPADM

## 2022-01-29 RX ORDER — BUDESONIDE AND FORMOTEROL FUMARATE DIHYDRATE 160; 4.5 UG/1; UG/1
2 AEROSOL RESPIRATORY (INHALATION) 2 TIMES DAILY
Status: DISCONTINUED | OUTPATIENT
Start: 2022-01-29 | End: 2022-01-30 | Stop reason: CLARIF

## 2022-01-29 RX ADMIN — SODIUM CHLORIDE, PRESERVATIVE FREE 10 ML: 5 INJECTION INTRAVENOUS at 22:28

## 2022-01-29 RX ADMIN — ONDANSETRON 4 MG: 2 INJECTION INTRAMUSCULAR; INTRAVENOUS at 02:18

## 2022-01-29 RX ADMIN — ONDANSETRON 4 MG: 2 INJECTION INTRAMUSCULAR; INTRAVENOUS at 12:47

## 2022-01-29 RX ADMIN — FENTANYL CITRATE 25 MCG: 50 INJECTION, SOLUTION INTRAMUSCULAR; INTRAVENOUS at 02:19

## 2022-01-29 RX ADMIN — ONDANSETRON 4 MG: 2 INJECTION INTRAMUSCULAR; INTRAVENOUS at 05:58

## 2022-01-29 RX ADMIN — ONDANSETRON 4 MG: 2 INJECTION INTRAMUSCULAR; INTRAVENOUS at 22:28

## 2022-01-29 RX ADMIN — Medication 500 MG: at 22:29

## 2022-01-29 RX ADMIN — IBUPROFEN 400 MG: 400 TABLET, FILM COATED ORAL at 23:02

## 2022-01-29 RX ADMIN — IOPAMIDOL 75 ML: 755 INJECTION, SOLUTION INTRAVENOUS at 02:08

## 2022-01-29 RX ADMIN — SODIUM CHLORIDE, POTASSIUM CHLORIDE, SODIUM LACTATE AND CALCIUM CHLORIDE: 600; 310; 30; 20 INJECTION, SOLUTION INTRAVENOUS at 06:10

## 2022-01-29 RX ADMIN — METHOCARBAMOL TABLETS 750 MG: 750 TABLET, COATED ORAL at 23:03

## 2022-01-29 ASSESSMENT — ENCOUNTER SYMPTOMS
BACK PAIN: 0
VOMITING: 0
SHORTNESS OF BREATH: 0
SORE THROAT: 0
APNEA: 0
NAUSEA: 0
ABDOMINAL PAIN: 0
NAUSEA: 1
COUGH: 0
COLOR CHANGE: 0
ABDOMINAL DISTENTION: 0
EYE DISCHARGE: 0

## 2022-01-29 ASSESSMENT — PAIN SCALES - GENERAL
PAINLEVEL_OUTOF10: 7
PAINLEVEL_OUTOF10: 10
PAINLEVEL_OUTOF10: 9
PAINLEVEL_OUTOF10: 4
PAINLEVEL_OUTOF10: 5
PAINLEVEL_OUTOF10: 5

## 2022-01-29 ASSESSMENT — PAIN DESCRIPTION - LOCATION: LOCATION: LEG

## 2022-01-29 ASSESSMENT — PAIN DESCRIPTION - PAIN TYPE: TYPE: ACUTE PAIN

## 2022-01-29 NOTE — ED NOTES
Pt vomited small amount message sent out to physician for additional order for zofran. Pt also requesting neck brace be removed.  Message sent to 10Sujata Graf RN  01/29/22 [id number]

## 2022-01-29 NOTE — ED NOTES
Bed: 17  Expected date:   Expected time:   Means of arrival:   Comments:  RAUL [id number] Gregory Overton RN  01/28/22 [id number]

## 2022-01-29 NOTE — ED PROVIDER NOTES
Clemencia Vance  ED  Emergency Department Encounter  EmergencyMedicineResident     This patient was seen during the COVID-19 crisis. There were limited resources and those resources we did have had to be conserved for the sickest of patients. Pt Name: Becky Cordoba  MRN: [id number]  [place] 1945  Date of evaluation: 1/28/22  PCP: Catherine Hannah [address]       Chief Complaint   Patient presents with   Marcelene Cool    Hip Pain       HISTORY OF PRESENT ILLNESS  (Location/Symptom, Timing/Onset, Context/Setting, Quality, Duration, Modifying Factors, Severity.)      Becky Cordoba is a 77 y.o. male who presents presents for evaluation of a fall at home. He uses a walker he states he was getting up to reach out to use his walker when he lost his balance and landed on his hip. He denies feeling dizzy or syncopal beforehand. He reports that it was purely a mechanical fall. He states he did hit his head. He denies loss of consciousness. He denies focal neurologic deficits. He is now experiencing pain throughout his entire left leg and his right hip. He thinks he landed on the calf. Patient reports he has a significant past medical history of atrial fibrillation for which she has pacemaker in place and is on Xarelto. He denies any other symptoms at this time.     PAST MEDICAL / SURGICAL /SOCIAL / FAMILY HISTORY      has a past medical history of Acute superficial gastritis without hemorrhage, Anastomotic stricture of stomach, Asthma, Atrial fibrillation ([address] Calculus of gallbladder without cholecystitis without obstruction, Chronic diastolic heart failure (HCC), Chronic rhinosinusitis, Class 2 severe obesity due to excess calories with serious comorbidity and body mass index (BMI) of 36.0 to 36.9 in [facility]), COPD (chronic obstructive pulmonary disease) ([facility] E. coli septicemia ([facility] E. coli UTI, Foot drop, right, Fracture of femur ([address] Gait disorder, Gastric out let obstruction, GERD (gastroesophageal reflux disease), Hallux valgus, acquired, bilateral, Hyperlipidemia, Hypertension, Impaired hearing, Internal hemorrhoids, Lymphedema of both lower extremities, Nausea & vomiting, OA (osteoarthritis) of knee, bilateral, Obesity, MARIAMA on CPAP, Osteoarthritis, Osteoarthritis of lumbar spine, S/P revision of total knee, Septicemia due to E. coli (HCC), Sick sinus syndrome (HCC), Simple chronic bronchitis (HCC), Slow transit constipation, Unspecified sleep apnea, and UTI (urinary tract infection). has a past surgical history that includes Finger amputation (); Gastric bypass surgery (); Carpal tunnel release; Colonoscopy; Rotator cuff repair; joint replacement ( & ); Hemorrhoid surgery; pr egd transoral biopsy single/multiple (N/A, 2018); Upper gastrointestinal endoscopy (2018); Upper gastrointestinal endoscopy (N/A, 2018); Upper gastrointestinal endoscopy (N/A, 2018); Upper gastrointestinal endoscopy (2018); pr egd flexible foreign body removal (N/A, 2018); Upper gastrointestinal endoscopy (2018); Upper gastrointestinal endoscopy (N/A, 10/01/2018); Upper gastrointestinal endoscopy (10/01/2018); Upper gastrointestinal endoscopy (N/A, 2018); Cystoscopy (2019); Cystoscopy (N/A, 2019); Upper gastrointestinal endoscopy (N/A, 10/15/2020); Colonoscopy (N/A, 2021); Pacemaker insertion (2021); and Upper gastrointestinal endoscopy (N/A, 2021).       Social History     Socioeconomic History    Marital status:      Spouse name: Not on file    Number of children: Not on file    Years of education: Not on file    Highest education level: Not on file   Occupational History    Not on file   Tobacco Use    Smoking status: Former Smoker     Packs/day: 1.00     Years: 20.00     Pack years: 20.00     Quit date: 1976     Years since quittin.1    Smokeless tobacco: Never Used Vaping Use    Vaping Use: Never used   Substance and Sexual Activity    Alcohol use: No    Drug use: No    Sexual activity: Not on file   Other Topics Concern    Not on file   Social History Narrative    Not on file     Social Determinants of Health     Financial Resource Strain: Low Risk     Difficulty of Paying Living Expenses: Not hard at all   Food Insecurity: No Food Insecurity    Worried About Running Out of Food in the Last Year: Never true    Jesica of Food in the Last Year: Never true   Transportation Needs:     Lack of Transportation (Medical): Not on file    Lack of Transportation (Non-Medical): Not on file   Physical Activity:     Days of Exercise per Week: Not on file    Minutes of Exercise per Session: Not on file   Stress:     Feeling of Stress : Not on file   Social Connections:     Frequency of Communication with Friends and Family: Not on file    Frequency of Social Gatherings with Friends and Family: Not on file    Attends Yarsani Services: Not on file    Active Member of [address] [place] [address] [employer] or Organizations: Not on file    Attends Club or Organization Meetings: Not on file    Marital Status: Not on file   Intimate Partner Violence:     Fear of Current or Ex-Partner: Not on file    Emotionally Abused: Not on file    Physically Abused: Not on file    Sexually Abused: Not on file   Housing Stability:     Unable to Pay for Housing in the Last Year: Not on file    Number of [place] in the Last Year: Not on file    Unstable Housing in the Last Year: Not on file       Family History   Problem Relation Age of Onset    Cancer Mother     Heart Attack Father        Allergies:  Darvocet [propoxyphene n-acetaminophen], Other, Oxycodone-acetaminophen, and Propoxyphene    Home Medications:  Prior to Admission medications    Medication Sig Start Date End Date Taking?  Authorizing Provider   benzonatate (TESSALON) 100 MG capsule TAKE 1 CAPSULE BY MOUTH THREE TIMES DAILY AS NEEDED FOR COUGH 1/26/22   [facility], APRN - CNP   vitamin B-12 (CYANOCOBALAMIN) 100 MCG tablet TAKE 1 TABLET BY MOUTH DAILY AS NEEDED FOR FATIGUE 1/26/22   [facility], APRN - CNP   docusate sodium (COLACE) 100 MG capsule TAKE 1 CAPSULE BY MOUTH TWICE DAILY AS NEEDED FOR CONSTIPATION 1/10/22   [facility], APRN - CNP   bumetanide (BUMEX) 1 MG tablet TAKE 2 TABLETS BY MOUTH EVERY MORNING AND TAKE 1 TABLET BY MOUTH IN THE AFTERNOON AND 1 TABLET EVERY EVENING IF WEIGHT IS >3 ABOVE BASELINE 1/10/22   [facility], APRN - CNP   cetirizine (ZYRTEC) 10 MG tablet TAKE 1 TABLET BY MOUTH DAILY 1/10/22   [facility], APRN - CNP   metoprolol succinate (TOPROL XL) 50 MG extended release tablet TAKE 1 TABLET BY MOUTH AT BEDTIME 1/10/22   [facility], APRN - CNP   CREON 61028-30085 units delayed release capsule TAKE 1 CAPSULE BY MOUTH THREE TIMES DAILY WITH MEALS 1/10/22   [facility], APRN - CNP   rivaroxaban (XARELTO) 20 MG TABS tablet TAKE 1 TABLET BY MOUTH DAILY 1/10/22   [facility], APRN - CNP   diclofenac sodium (VOLTAREN) 1 % GEL Apply 2 g topically 4 times daily as needed for Pain 1/10/22   Vincent Olmos MD   fluticasone Britton Carlos) 50 MCG/ACT nasal spray 2 sprays by Nasal route daily 12/29/21   [facility], APRN - CNP   Multiple Vitamin (MULTI-VITAMINS) TABS TAKE 1 TABLET BY MOUTH DAILY 12/10/21   [facility], APRN - CNP   potassium chloride (KLOR-CON M) 20 MEQ extended release tablet TAKE 1 TABLET BY MOUTH TWICE DAILY 12/10/21   [facility], APRN - CNP   tiZANidine (ZANAFLEX) 4 MG tablet TAKE 1 TABLET BY MOUTH DAILY AS NEEDED 12/10/21   [facility], APRN - CNP   rOPINIRole (REQUIP) 0.25 MG tablet TAKE 1 TABLET BY MOUTH TWICE DAILY 11/15/21   [facility], JULIAN Covington CNP   pantoprazole (PROTONIX) 40 MG tablet TAKE 1 TABLET BY MOUTH DAILY 11/15/21   JULIAN Amos CNP   tamsulosin [facility]) 0.4 MG capsule Take 1 capsule by mouth daily 10/19/21   JULIAN Amos CNP Fluticasone furoate-vilanterol (BREO ELLIPTA) 200-25 MCG/INH AEPB inhaler INHALE 1 PUFF INTO THE LUNGS DAILY **RINSE MOUTH AFTER EACH USE**    Historical Provider, MD   Nutritional Supplements (BOOST) LIQD 1 can twice a day for meals 7/28/21   Kandis Brunner, APRN - CNP   albuterol sulfate HFA (VENTOLIN HFA) 108 (90 Base) MCG/ACT inhaler Inhale 2 puffs into the lungs every 6 hours as needed for Wheezing or Shortness of Breath    Historical Provider, MD   carboxymethylcellulose (REFRESH PLUS) 0.5 % SOLN ophthalmic solution Apply 1 drop to eye 4 times daily    Historical Provider, MD   ascorbic acid (VITAMIN C) 500 MG tablet Take 1 tablet by mouth daily 10/26/20   Laura Lopez PA-C   ferrous sulfate (FE TABS 325) 325 (65 Fe) MG EC tablet Take 1 tablet by mouth 2 times daily (with meals) 10/16/20   Tyrone Olivo PA-C   SM LUBRICANT EYE DROPS 0.4-0.3 % ophthalmic solution PLACE 1 DROP INTO BOTH EYES FOUR TIMES DAILY AS NEEDED FOR DRY EYES 10/17/19   Laura Lopez PA-C   Handicap Placard MISC by Does not apply route 6/27/19   Laura Lopez PA-C   Incontinence Supply Disposable (INCONTINENCE BRIEF LARGE) MISC To use as directed. Use 3x a day 4/30/19   Laura Lopez PA-C   Foot Care Products (TRI-BALANCE ORTHOTICS MENS) MISC Provide insurance covered orthotics shoes, wear daily 12/12/18   Laura Lopez PA-C   ipratropium-albuterol (DUONEB) 0.5-2.5 (3) MG/3ML SOLN nebulizer solution Inhale 1 vial into the lungs every 4 hours as needed     Historical Provider, MD   Armodafinil 200 MG TABS Take 1 tablet by mouth 2 times daily. 4/2/18   Historical Provider, MD       REVIEW OF SYSTEMS    (2-9 systems for level 4, 10 or more forlevel 5)      Review of Systems   Constitutional: Negative for activity change, chills and fever. HENT: Negative for congestion, sinus pain and sore throat. Eyes: Negative for pain and visual disturbance. Respiratory: Negative for cough and shortness of breath. Cardiovascular: Negative for chest pain. Gastrointestinal: Negative for abdominal pain, diarrhea, nausea and vomiting. Genitourinary: Negative for difficulty urinating, dysuria and hematuria. Musculoskeletal:        Fall, left hip pain, right hip pain, left knee pain   Skin: Negative for rash and wound. Neurological: Negative for dizziness, light-headedness and headaches. Head trauma   Psychiatric/Behavioral: Negative for agitation and confusion. PHYSICAL EXAM   (up to 7 for level 4, 8 or more forlevel 5)      ED TRIAGE VITALS BP: 105/75, Temp: 98.1 °F (36.7 °C), Pulse: 70, Resp: 18, SpO2: 94 %    Vitals:    01/28/22 2300 01/29/22 0030 01/29/22 0130 01/29/22 0341   BP: 110/69 87/63 (!) 101/59 95/71   Pulse: 70 72 70 71   Resp: 22 14 16    Temp:       TempSrc:       SpO2: 98% 91% 97% 95%   Weight:       Height:             Physical Exam  Vitals and nursing note reviewed. Constitutional:       General: He is not in acute distress. Appearance: Normal appearance. HENT:      Head: Normocephalic and atraumatic. Nose: Nose normal.      Mouth/Throat:      Mouth: Mucous membranes are moist.   Eyes:      Extraocular Movements: Extraocular movements intact. Pupils: Pupils are equal, round, and reactive to light. Cardiovascular:      Rate and Rhythm: Normal rate and regular rhythm. Pulses: Normal pulses. Heart sounds: Normal heart sounds. Pulmonary:      Effort: Pulmonary effort is normal.      Breath sounds: Normal breath sounds. Abdominal:      General: Abdomen is flat. Palpations: Abdomen is soft. Musculoskeletal:      Cervical back: Normal range of motion. Comments: Tenderness palpation over the left greater trochanter, left femur and left knee. Tenderness to palpation over the right greater trochanter. Difficult to identify leg length discrepancy as the patient is rotated onto his right hip for comfort. No tenderness in the CT LS spine.    Skin: General: Skin is warm and dry. Capillary Refill: Capillary refill takes less than 2 seconds. Neurological:      General: No focal deficit present. Mental Status: He is alert and oriented to person, place, and time.    Psychiatric:         Mood and Affect: Mood normal.         Behavior: Behavior normal.           DIFFERENTIAL  DIAGNOSIS     PLAN (LABS / IMAGING / EKG):  Orders Placed This Encounter   Procedures    COVID-19, Rapid    XR FEMUR LEFT (MIN 2 VIEWS)    XR KNEE LEFT (3 VIEWS)    CT HEAD WO CONTRAST    XR HIP W PELVIS MIN 5 VWS BILATERAL    XR SHOULDER LEFT (MIN 2 VIEWS)    CT CERVICAL SPINE WO CONTRAST    CT CHEST ABDOMEN PELVIS W CONTRAST    CT LUMBAR SPINE TRAUMA RECONSTRUCTION    CT THORACIC SPINE TRAUMA RECONSTRUCTION    XR CHEST PORTABLE    TRAUMA PANEL    Vitamin D 25 Hydroxy    Diet NPO Exceptions are: Sips of Water with Meds    Inpatient consult to Trauma Surgery    Inpatient consult to Orthopedic Surgery    EKG 12 Lead    PATIENT STATUS (FROM ED OR OR/PROCEDURAL) Inpatient       MEDICATIONS ORDERED:  ED Medication Orders (From admission, onward)    Start Ordered     Status Ordering Provider    01/29/22 0230 01/29/22 0216  ondansetron (ZOFRAN) injection 4 mg  ONCE         Last MAR action: Given - [facility] on 01/29/22 at 0218 Cinthia Soumya L    01/29/22 0145 01/29/22 0144  fentaNYL (SUBLIMAZE) injection 25 mcg  ONCE         Last MAR action: Given - by KELLI FARIAS on 01/29/22 at 0219 Cinthia Soumya L    01/29/22 0136 01/29/22 0136  iopamidol (ISOVUE-370) 76 % injection 75 mL  IMG ONCE PRN         Last MAR action: Given - by Bria Mcgarry on 01/29/22 at 0208 Arielle Dobbs I    01/29/22 0015 01/29/22 0001  ceFAZolin (ANCEF) 2000 mg in dextrose 5 % 50 mL IVPB  ON CALL TO O.R.        Question:  Antimicrobial Indications  Answer:  Surgical Prophylaxis    Acknowledged MILE VAZ    01/28/22 2300 01/28/22 2256  orphenadrine (NORFLEX) injection 60 mg  ONCE         Last 28.4 - 34.8 g/dL    RDW 12.8 11.8 - 14.4 %    Platelets 188 289 - 116 k/uL    MPV 9.3 8.1 - 13.5 fL    NRBC Automated 0.0 0.0 per 100 WBC    CREATININE 0.90 0.70 - 1.20 mg/dL    GFR Non-African American >60 >60 mL/min    GFR African American >60 >60 mL/min    GFR Comment          GFR Staging NOT REPORTED     Glucose 92 70 - 99 mg/dL    hCG Qual PT IS MALE NEGATIVE    Sodium 136 135 - 144 mmol/L    Potassium 4.0 3.7 - 5.3 mmol/L    Chloride 100 98 - 107 mmol/L    CO2 26 20 - 31 mmol/L    Anion Gap 10 9 - 17 mmol/L    Protime 15.9 (H) 9.1 - 12.3 sec    INR 1.5     PTT 31.6 (H) 20.5 - 30.5 sec    pH, Conner 7.394 7.320 - 7.420    pCO2, Conner 48.5 39 - 55    pO2, Conner 26.1 (L) 30 - 50    HCO3, Venous 29.0 24 - 30 mmol/L    Positive Base Excess, Conner 3.9 (H) 0.0 - 2.0 mmol/L    Negative Base Excess, Conner NOT REPORTED 0.0 - 2.0 mmol/L    O2 Sat, Conner 45.5 (L) 60.0 - 85.0 %    Total Hb NOT REPORTED 12.0 - 16.0 g/dl    Oxyhemoglobin NOT REPORTED 95.0 - 98.0 %    Carboxyhemoglobin 2.2 0 - 5 %    Methemoglobin NOT REPORTED 0.0 - 1.5 %    Pt Temp 37.0     pH, Conner, Temp Adj NOT REPORTED 7.320 - 7.420    pCO2, Conner, Temp Adj NOT REPORTED 39 - 55 mmHg    pO2, Conner, Temp Adj NOT REPORTED 30 - 50 mmHg    O2 Device/Flow/% NOT REPORTED     Respiratory Rate NOT REPORTED     Francis Test NOT REPORTED     Sample Site NOT REPORTED     Pt. Position NOT REPORTED     Mode NOT REPORTED     Set Rate NOT REPORTED     Total Rate NOT REPORTED     VT NOT REPORTED     FIO2 UNKNOWN     Peep/Cpap NOT REPORTED     PSV NOT REPORTED     Text for Respiratory NOT REPORTED     NOTIFICATION NOT REPORTED     NOTIFICATION TIME NOT REPORTED        RADIOLOGY:  XR CHEST PORTABLE   Final Result   No acute cardiopulmonary process. CT CHEST ABDOMEN PELVIS W CONTRAST   Final Result   Intertrochanteric fracture of the proximal left femur. Small pericardial effusion. Uncomplicated descending colon diverticulosis.       No acute thoracic, abdominal, or pelvic abnormality. CT LUMBAR SPINE TRAUMA RECONSTRUCTION   Final Result   No acute fracture or malalignment of the lumbar spine. Multilevel degenerative change with bilateral bony foraminal narrowing in the   mid and lower lumbar spine. CT THORACIC SPINE TRAUMA RECONSTRUCTION   Final Result   No acute fracture or malalignment of the thoracic spine. CT HEAD WO CONTRAST   Final Result   No acute intracranial abnormality. CT CERVICAL SPINE WO CONTRAST   Final Result   No acute fracture or malalignment of the cervical spine. Multiple nodules within the right thyroid lobe and correlation with   nonemergent sonography is recommended. XR SHOULDER LEFT (MIN 2 VIEWS)   Final Result   No acute osseous or soft tissue abnormality. Degenerative change of the left shoulder joint spaces with evidence of prior   rotator cuff repair. XR FEMUR LEFT (MIN 2 VIEWS)   Final Result   Intertrochanteric fracture of the proximal left femur. XR KNEE LEFT (3 VIEWS)   Final Result   No acute osseous or soft tissue abnormality. XR HIP W PELVIS MIN 5 VWS BILATERAL   Final Result   Intertrochanteric fracture of the proximal left femur. CONSULTS:  IP CONSULT TO TRAUMA SURGERY  IP CONSULT TO ORTHOPEDIC SURGERY  IP CONSULT TO CARDIOLOGY    CRITICAL CARE:  See attending physician note    FINAL IMPRESSION      1. Left displaced femoral neck fracture ([address]          DISPOSITION / PLAN     DISPOSITION Admitted 01/29/2022 03:16:43 AM      PATIENT REFERRED TO:  No follow-up provider specified.     DISCHARGE MEDICATIONS:  New Prescriptions    No medications on file     Modified Medications    No medications on file        Rylie Phipps MD  Emergency Medicine Resident    (Please note that portions of this note were completed with a voice recognition program.  Efforts were made to edit the dictations but occasionally words are mis-transcribed.)       Rylie Phipps MD  Resident  01/29/22 [id number]

## 2022-01-29 NOTE — PROGRESS NOTES
Writer spoke with  resident at bedside regarding LR rate and CHF history. Per Resident reduce fluids to 50 ml/hr. Writer reduced fluids at bedside while resident present. per resident, fluid instructions to follow. Awaiting orders.

## 2022-01-29 NOTE — PROGRESS NOTES
CTL Spine Evaluation for Spine Clearance:    Pt is a 77 y.o. male who was admitted on 1/28 s/p fall. Pt w/ complaints of left leg pain. C-Spine precautions of C-collar with spinal neutrality maintained since arrival with current exam directed at further evaluation of spine for clearance purposes. Pt chart and current images reviewed. CT C-Spine negative for acute fracture, subluxation, or traumatic injury. Patient does not have a distracting injury, is not acutely intoxicated and is alert, oriented and fully able to participate in exam.      Pt denies c-spine pain while resting in c-collar. C-collar removed w/ c-spine neutrality maintained. Pt denies midline pain with palpation of spinous processes and axial loading. Pt demonstrated full flexion, extension, and SB ROM without complaints of pain. TLS precautions of supine position maintained since arrival.  Pt denies midline pain with palpation of spinous processes. CT dorsal lumbar negative for acute fracture, subluxation, or traumatic injury. C-spine is considered cleared w/out need for further imaging, evaluation, or continuation of c-collar. TLS considered clear w/out need for further imaging, evaluation, or continuation of supine bedrest precautions.     Electronically signed by Kendrick Fall MD on 1/29/2022 at 12:45 PM

## 2022-01-29 NOTE — ED PROVIDER NOTES
[facility]     Emergency Department     Faculty Attestation    I performed a history and physical examination of the patient and discussed management with the resident. I reviewed the residents note and agree with the documented findings and plan of care. Any areas of disagreement are noted on the chart. I was personally present for the key portions of any procedures. I have documented in the chart those procedures where I was not present during the key portions. I have reviewed the emergency nurses triage note. I agree with the chief complaint, past medical history, past surgical history, allergies, medications, social and family history as documented unless otherwise noted below. For Physician Assistant/ Nurse Practitioner cases/documentation I have personally evaluated this patient and have completed at least one if not all key elements of the E/M (history, physical exam, and MDM). Additional findings are as noted. I have personally seen and evaluated the patient. I find the patient's history and physical exam are consistent with the NP/PA documentation. I agree with the care provided, treatment rendered, disposition and follow-up plan. This patient was evaluated in the Emergency Department for symptoms described in the history of present illness. The patient was evaluated in the context of the global COVID-19 pandemic, which necessitated consideration that the patient might be at risk for infection with the SARS-CoV-2 virus that causes COVID-19. Institutional protocols and algorithms that pertain to the evaluation of patients at risk for COVID-19 are in a state of rapid change based on information released by regulatory bodies including the CDC and federal and state organizations. These policies and algorithms were followed during the patient's care in the ED. 81-year-old male presenting with left hip pain.   Patient was reaching for his walker at home, was unable to grab it, and fell onto his left hip. He was unable to ambulate after this. He is on Xarelto for A. fib. He denies any loss of consciousness, but did strike his head. No chest pain, back pain, or arm pain. Exam:  General: Laying on the bed, awake, alert and in no acute distress  CV: normal rate and regular rhythm  Lungs: Breathing comfortably on room air with no tachypnea, hypoxia, or increased work of breathing  Abdomen: soft, non-tender, non-distended  MSK: Tenderness over the left hip. 2+ DP and PT pulses bilaterally. Feet are warm and well-perfused. Neurovascularly intact.     Plan:  X-ray hip, knee, femur on the left  CT head given head strike on anticoagulation    X-ray reveals peritrochanteric fracture of the left femur  Will consult trauma and orthopedics for evaluation and admission        Ramses Wilson MD   Attending Emergency  Physician    (Please note that portions of this note were completed with a voice recognition program. Efforts were made to edit the dictations but occasionally words are mis-transcribed.)             Ramses Wilson MD  01/28/22 [id number]

## 2022-01-29 NOTE — H&P
TRAUMA HISTORY AND PHYSICAL EXAMINATION    PATIENT NAME: Bryson Cosme  YOB: 1945  MEDICAL RECORD NO. [id number]   DATE: 1/29/2022  PRIMARY CARE PHYSICIAN: JULIAN Garcia CNP  PATIENT EVALUATED AT THE REQUEST OF : Dax     ACTIVATION   []Trauma Alert     [] Trauma Priority     [x]Trauma Consult. IMPRESSION:     Patient Active Problem List   Diagnosis    Chronic atrial fibrillation (HCC)    Dyslipidemia    Gastroesophageal reflux disease without esophagitis    Osteoarthritis of lumbar spine    OA (osteoarthritis) of knee, bilateral    Foot drop, right    Hallux valgus, acquired, bilateral    Hearing impairment    Essential hypertension    Slow transit constipation    MARIAMA on CPAP    Simple chronic bronchitis (HCC)    Non-intractable vomiting    Hospital-acquired bacterial pneumonia    Pneumonia due to organism    COPD (chronic obstructive pulmonary disease) (HCC)    Chronic diastolic heart failure (HCC)    History of bariatric surgery including banding leading to esophageal stricture and aspiration    BPH (benign prostatic hyperplasia)    Myalgia    Atrial fibrillation with slow ventricular response ([address]    Pacemaker pocket hematoma, initial encounter    Pneumonia-community-acquired    Acute hypoxemic respiratory failure (HCC)    SIRS (systemic inflammatory response syndrome) (HCC)    Pulmonary hypertension (HCC)    Presence of permanent cardiac pacemaker    Aspiration pneumonia (HCC)    SBO (small bowel obstruction) ([address]       MEDICAL DECISION MAKING AND PLAN:       1. Admit to trauma service, MedSurg telemetry  2. Left intertrochanteric femur fracture   -Orthopedic surgery consulted and following. Surgical repair pending with stratification. Nonweightbearing left lower extremity.   - NPO  3. History of atrial fibrillation, pacemaker, CHF   -Echo ordered   -Cardiology contacted for preoperative risk stratification  4.   Covid positive   -Infectious disease consulted   -Droplet plus precautions  5. Will need tertiary examination        CONSULT SERVICES    [] Neurosurgery     [x] Orthopedic Surgery    [] Cardiothoracic     [] Facial Trauma    [] Plastic Surgery (Burn)    [] Pediatric Surgery     [] Internal Medicine    [] Pulmonary Medicine    [] Other: Cardiology, infectious disease     HISTORY:     Chief Complaint:  \"Left hip pain\"    INJURY SUMMARY  Left intertrochanteric femur fracture      GENERAL DATA  Age 77 y.o.  male   Patient information was obtained from patient. History/Exam limitations: none. Patient presented to the Emergency Department by ambulance where the patient received see Ambulance Run Sheet prior to arrival.  Injury Date: 1/29/2022   Approximate Injury Time: PTA       Transport mode:   [x]Ambulance      [] Helicopter     []Car       [] Other  Referring Hospital: [address] [place] [address], (e.g., home, farm, industry, street)  Specific Details of Location (e.g., bedroom, kitchen, garage): Home  Type of Residence (if occurred in home setting) (e.g., apartment, mobile home, single family home): Home    MECHANISM OF INJURY         [x] Fall    [x]From Standing     []From Height    Ft     []Down Stairs ___steps    HISTORY:     Vishal Tolliver is a 77 y.o. male that presented to the Emergency Department following a fall from standing height. Patient states that he was trying to get to his walker, when he fell. States that he struck his left temple on the ground. Denies any loss of consciousness. Patient states that he does take Xarelto for history of atrial fibrillation and having a pacemaker. Patient unable to get up after fall. He is reporting left hip pain. Denies pain to any other area. Denies any numbness, tingling, or weakness.     Loss of Consciousness [x]No   []Yes Duration(min)       [] Unknown       MEDICATIONS:   []  None     []  Information not available due to exam limitations documented above    Prior to Admission medications    Medication Sig Start Date End Date Taking?  Authorizing Provider   benzonatate (TESSALON) 100 MG capsule TAKE 1 CAPSULE BY MOUTH THREE TIMES DAILY AS NEEDED FOR COUGH 1/26/22   JULIAN Kumar CNP   vitamin B-12 (CYANOCOBALAMIN) 100 MCG tablet TAKE 1 TABLET BY MOUTH DAILY AS NEEDED FOR FATIGUE 1/26/22   JULIAN Kumar CNP   docusate sodium (COLACE) 100 MG capsule TAKE 1 CAPSULE BY MOUTH TWICE DAILY AS NEEDED FOR CONSTIPATION 1/10/22   JULIAN Kumar CNP   bumetanide (BUMEX) 1 MG tablet TAKE 2 TABLETS BY MOUTH EVERY MORNING AND TAKE 1 TABLET BY MOUTH IN THE AFTERNOON AND 1 TABLET EVERY EVENING IF WEIGHT IS >3 ABOVE BASELINE 1/10/22   JULIAN Kumar CNP   cetirizine (ZYRTEC) 10 MG tablet TAKE 1 TABLET BY MOUTH DAILY 1/10/22   JULIAN Kumar CNP   metoprolol succinate (TOPROL XL) 50 MG extended release tablet TAKE 1 TABLET BY MOUTH AT BEDTIME 1/10/22   JULIAN Kumar CNP   CREON 85633-31083 units delayed release capsule TAKE 1 CAPSULE BY MOUTH THREE TIMES DAILY WITH MEALS 1/10/22   JULIAN Kumar CNP   rivaroxaban (XARELTO) 20 MG TABS tablet TAKE 1 TABLET BY MOUTH DAILY 1/10/22   JULIAN Kumar CNP   diclofenac sodium (VOLTAREN) 1 % GEL Apply 2 g topically 4 times daily as needed for Pain 1/10/22   Torrey Muhammad MD   fluticasone Gogo Rangel) 50 MCG/ACT nasal spray 2 sprays by Nasal route daily 12/29/21   JULIAN Kumar CNP   Multiple Vitamin (MULTI-VITAMINS) TABS TAKE 1 TABLET BY MOUTH DAILY 12/10/21   JULIAN Kumar CNP   potassium chloride (KLOR-CON M) 20 MEQ extended release tablet TAKE 1 TABLET BY MOUTH TWICE DAILY 12/10/21   JULIAN Kumar CNP   tiZANidine (ZANAFLEX) 4 MG tablet TAKE 1 TABLET BY MOUTH DAILY AS NEEDED 12/10/21   JULIAN Kumar CNP   rOPINIRole (REQUIP) 0.25 MG tablet TAKE 1 TABLET BY MOUTH TWICE DAILY 11/15/21   JULIAN Kumar CNP   pantoprazole ([facility]) 40 MG tablet TAKE 1 TABLET BY MOUTH DAILY 11/15/21   JULIAN Garcia CNP   tamsulosin [facility]) 0.4 MG capsule Take 1 capsule by mouth daily 10/19/21   JULIAN Garcia CNP   Fluticasone furoate-vilanterol (BREO ELLIPTA) 200-25 MCG/INH AEPB inhaler INHALE 1 PUFF INTO THE LUNGS DAILY **RINSE MOUTH AFTER EACH USE**    Historical Provider, MD   Nutritional Supplements (BOOST) LIQD 1 can twice a day for meals 7/28/21   JULIAN Garcia CNP   albuterol sulfate HFA (VENTOLIN HFA) 108 (90 Base) MCG/ACT inhaler Inhale 2 puffs into the lungs every 6 hours as needed for Wheezing or Shortness of Breath    Historical Provider, MD   carboxymethylcellulose (REFRESH PLUS) 0.5 % SOLN ophthalmic solution Apply 1 drop to eye 4 times daily    Historical Provider, MD   ascorbic acid (VITAMIN C) 500 MG tablet Take 1 tablet by mouth daily 10/26/20   Brunilda Gilman PA-C   ferrous sulfate (FE TABS 325) 325 (65 Fe) MG EC tablet Take 1 tablet by mouth 2 times daily (with meals) 10/16/20   Pedro Miguel PA-C   SM LUBRICANT EYE DROPS 0.4-0.3 % ophthalmic solution PLACE 1 DROP INTO BOTH EYES FOUR TIMES DAILY AS NEEDED FOR DRY EYES 10/17/19   Brunilda Gilman PA-C   Handicap Placard MISC by Does not apply route 6/27/19   Brunilda Gilman PA-C   Incontinence Supply Disposable (INCONTINENCE BRIEF LARGE) MISC To use as directed. Use 3x a day 4/30/19   Brunilda Gilman PA-C   Foot Care Products (TRI-BALANCE ORTHOTICS MENS) MISC Provide insurance covered orthotics shoes, wear daily 12/12/18   Brunilda Gilman PA-C   ipratropium-albuterol (DUONEB) 0.5-2.5 (3) MG/3ML SOLN nebulizer solution Inhale 1 vial into the lungs every 4 hours as needed     Historical Provider, MD   Armodafinil 200 MG TABS Take 1 tablet by mouth 2 times daily.   4/2/18   Historical Provider, MD       ALLERGIES:   []  None    []   Information not available due to exam limitations documented above     Darvocet [propoxyphene n-acetaminophen], Other, Oxycodone-acetaminophen, and Propoxyphene    PAST MEDICAL HISTORY: []  None   []   Information not available due to exam limitations documented above      has a past medical history of Acute superficial gastritis without hemorrhage, Anastomotic stricture of stomach, Asthma, Atrial fibrillation ([address] Calculus of gallbladder without cholecystitis without obstruction, Chronic diastolic heart failure (HCC), Chronic rhinosinusitis, Class 2 severe obesity due to excess calories with serious comorbidity and body mass index (BMI) of 36.0 to 36.9 in adult [facility]), COPD (chronic obstructive pulmonary disease) ([address] E. coli septicemia ([address] E. coli UTI, Foot drop, right, Fracture of femur ([address] Gait disorder, Gastric out let obstruction, GERD (gastroesophageal reflux disease), Hallux valgus, acquired, bilateral, Hyperlipidemia, Hypertension, Impaired hearing, Internal hemorrhoids, Lymphedema of both lower extremities, Nausea & vomiting, OA (osteoarthritis) of knee, bilateral, Obesity, MARIAMA on CPAP, Osteoarthritis, Osteoarthritis of lumbar spine, S/P revision of total knee, Septicemia due to E. coli ([address] Sick sinus syndrome ([address] Simple chronic bronchitis ([address] Slow transit constipation, Unspecified sleep apnea, and UTI (urinary tract infection). has a past surgical history that includes Finger amputation (1966); Gastric bypass surgery (1985); Carpal tunnel release; Colonoscopy; Rotator cuff repair; joint replacement (2004 & 2005); Hemorrhoid surgery; pr egd transoral biopsy single/multiple (N/A, 05/25/2018); Upper gastrointestinal endoscopy (08/13/2018); Upper gastrointestinal endoscopy (N/A, 08/13/2018); Upper gastrointestinal endoscopy (N/A, 08/13/2018); Upper gastrointestinal endoscopy (08/16/2018); pr egd flexible foreign body removal (N/A, 08/16/2018); Upper gastrointestinal endoscopy (08/16/2018); Upper gastrointestinal endoscopy (N/A, 10/01/2018); Upper gastrointestinal endoscopy (10/01/2018);  Upper Physical Exam  HENT:      Head: Normocephalic and atraumatic. Right Ear: External ear normal.      Left Ear: External ear normal.      Nose: Nose normal.      Mouth/Throat:      Mouth: Mucous membranes are moist.   Eyes:      Extraocular Movements: Extraocular movements intact. Cardiovascular:      Rate and Rhythm: Normal rate. Rhythm irregular. Pulses: Normal pulses. Pulmonary:      Effort: Pulmonary effort is normal. No respiratory distress. Abdominal:      General: There is no distension. Palpations: Abdomen is soft. Tenderness: There is no abdominal tenderness. There is no guarding or rebound. Musculoskeletal:      Cervical back: Neck supple. Comments: Pain to left hip, no obvious deformity  No obvious injury or deformity to the other extremities   Skin:     General: Skin is warm and dry. Capillary Refill: Capillary refill takes less than 2 seconds. Comments: Sacral wound   Neurological:      General: No focal deficit present. Mental Status: He is alert. Psychiatric:         Mood and Affect: Mood normal.          FOCUSED ABDOMINAL SONOGRAM FOR TRAUMA (FAST): A good  quality examination was performed by Dr. Dee Fernandez and representative images were obtained. [x] No free fluid in the abdomen   [] Free fluid in RUQ   [] Free fluid in LUQ  [] Free fluid in Pelvis  [] Pericardial fluid  [] Other:        RADIOLOGY  XR CHEST PORTABLE   Final Result   No acute cardiopulmonary process. CT CHEST ABDOMEN PELVIS W CONTRAST   Final Result   Intertrochanteric fracture of the proximal left femur. Small pericardial effusion. Uncomplicated descending colon diverticulosis. No acute thoracic, abdominal, or pelvic abnormality. CT LUMBAR SPINE TRAUMA RECONSTRUCTION   Final Result   No acute fracture or malalignment of the lumbar spine. Multilevel degenerative change with bilateral bony foraminal narrowing in the   mid and lower lumbar spine. CT THORACIC SPINE TRAUMA RECONSTRUCTION   Final Result   No acute fracture or malalignment of the thoracic spine. CT HEAD WO CONTRAST   Final Result   No acute intracranial abnormality. CT CERVICAL SPINE WO CONTRAST   Final Result   No acute fracture or malalignment of the cervical spine. Multiple nodules within the right thyroid lobe and correlation with   nonemergent sonography is recommended. XR SHOULDER LEFT (MIN 2 VIEWS)   Final Result   No acute osseous or soft tissue abnormality. Degenerative change of the left shoulder joint spaces with evidence of prior   rotator cuff repair. XR FEMUR LEFT (MIN 2 VIEWS)   Final Result   Intertrochanteric fracture of the proximal left femur. XR KNEE LEFT (3 VIEWS)   Final Result   No acute osseous or soft tissue abnormality. XR HIP W PELVIS MIN 5 VWS BILATERAL   Final Result   Intertrochanteric fracture of the proximal left femur.                LABS    Labs Reviewed   COVID-19, RAPID - Abnormal; Notable for the following components:       Result Value    SARS-CoV-2, Rapid DETECTED (*)     All other components within normal limits   TRAUMA PANEL - Abnormal; Notable for the following components:    RBC 3.61 (*)     Hemoglobin 11.3 (*)     Hematocrit 34.4 (*)     Protime 15.9 (*)     PTT 31.6 (*)     pO2, [place] 26.1 (*)     Positive Base Excess, [place] 3.9 (*)     O2 Sat, [place] 45.5 (*)     All other components within normal limits   CBC WITH AUTO DIFFERENTIAL - Abnormal; Notable for the following components:    RBC 3.51 (*)     Hemoglobin 11.1 (*)     Hematocrit 33.9 (*)     All other components within normal limits   BASIC METABOLIC PANEL - Abnormal; Notable for the following components:    Glucose 101 (*)     All other components within normal limits   VITAMIN D 25 HYDROXY         Cathy Sheppard DO  1/29/22, 12:54 AM

## 2022-01-29 NOTE — CARE COORDINATION
Case Management Initial Discharge Plan  Jackson Cook,             Met with:spouse/SO Louis Wan over the phone to discuss discharge plans. Information verified: address, contacts, phone number, , insurance Yes  Insurance Provider: [facility]    Emergency Contact/Next of Kin name & number: Yelena Suarez (wife) [phone]  Who are involved in patient's support system? wife    PCP: JULIAN Smith CNP  Date of last visit: 1 month ago      Discharge Planning    Living Arrangements:        Home has 2 stories  4 stairs to climb to get into front door, stairs to climb to reach second floor  Location of bedroom/bathroom in home main    Patient able to perform ADL's:Independent    Current Services (outpatient & in home) none  DME equipment: cane, walker, wheelchair, shower chair, hospital bed, elevated toilet seat  DME provider:     Is patient receiving oral anticoagulation therapy? Yes Xarelto    If indicated:   Physician managing anticoagulation treatment:   Where does patient obtain lab work for ATC treatment? Potential Assistance Needed:       Patient agreeable to home care: No  [place] of choice provided:  no    Prior SNF/Rehab Placement and Facility: [facility]  Agreeable to SNF/Rehab: Yes  [place] of choice provided: yes     Evaluation: no    Expected Discharge date:       Patient expects to be discharged to: If home: is the family and/or caregiver wiling & able to provide support at home? Who will be providing this support? *    Follow Up Appointment: Best Day/ Time:      Transportation provider:   Transportation arrangements needed for discharge: **    Readmission Risk              Risk of Unplanned Readmission:  13             Does patient have a readmission risk score greater than 14?: Yes  If yes, follow-up appointment must be made within 7 days of discharge.      Goals of Care:       Educated spouse on transitional options, provided freedom of choice and are agreeable with plan      Discharge Plan: await hip OR, likely to need rehab          Electronically signed by Flaca Amaya RN on 1/29/22 at 10:03 AM EST

## 2022-01-29 NOTE — PLAN OF CARE
Problem: Airway Clearance - Ineffective  Goal: Achieve or maintain patent airway  Outcome: Ongoing     Problem: Gas Exchange - Impaired  Goal: Absence of hypoxia  Outcome: Ongoing  Goal: Promote optimal lung function  Outcome: Ongoing     Problem: Breathing Pattern - Ineffective  Goal: Ability to achieve and maintain a regular respiratory rate  Outcome: Ongoing     Problem:  Body Temperature -  Risk of, Imbalanced  Goal: Ability to maintain a body temperature within defined limits  Outcome: Ongoing  Goal: Will regain or maintain usual level of consciousness  Outcome: Ongoing  Goal: Complications related to the disease process, condition or treatment will be avoided or minimized  Outcome: Ongoing     Problem: Isolation Precautions - Risk of Spread of Infection  Goal: Prevent transmission of infection  Outcome: Ongoing     Problem: Nutrition Deficits  Goal: Optimize nutritional status  Outcome: Ongoing     Problem: Risk for Fluid Volume Deficit  Goal: Maintain normal heart rhythm  Outcome: Ongoing  Goal: Maintain absence of muscle cramping  Outcome: Ongoing  Goal: Maintain normal serum potassium, sodium, calcium, phosphorus, and pH  Outcome: Ongoing     Problem: Loneliness or Risk for Loneliness  Goal: Demonstrate positive use of time alone when socialization is not possible  Outcome: Ongoing     Problem: Fatigue  Goal: Verbalize increase energy and improved vitality  Outcome: Ongoing     Problem: Patient Education: Go to Patient Education Activity  Goal: Patient/Family Education  Outcome: Ongoing     Problem: Pain:  Goal: Pain level will decrease  Description: Pain level will decrease  Outcome: Ongoing  Goal: Control of acute pain  Description: Control of acute pain  Outcome: Ongoing  Goal: Control of chronic pain  Description: Control of chronic pain  Outcome: Ongoing     Problem: Skin Integrity:  Goal: Absence of new skin breakdown  Description: Absence of new skin breakdown  Outcome: Ongoing     Problem: Falls - Risk of:  Goal: Will remain free from falls  Description: Will remain free from falls  Outcome: Ongoing  Goal: Absence of physical injury  Description: Absence of physical injury  Outcome: Ongoing

## 2022-01-29 NOTE — PROGRESS NOTES
Physical Therapy        Physical Therapy Cancel Note      DATE: 2022    NAME: Rebecca Ge  MRN: [id number]   : 1945      Patient not seen this date for Physical Therapy due to:    Strict Bedrest:Pt with noted femur fracture after fall- orthopedics planning for surgical intervention pending clearance.  Check back post-surgical.       Electronically signed by Robby Rodriguez PT on 2022 at 1:52 PM

## 2022-01-29 NOTE — CONSULTS
Orthopaedic Surgery Consult  (Dr. Shahriar Iverson)      CC/Reason for consult:  Left kathryn-implant intertrochanteric femur fracture    HPI:      The patient is a 77 y.o. male who presents to [facility] with left hip pain. Patient reports that earlier today he was getting up from his chair to get to his walker when he fell onto his left hip. He denies LOC. He did hit the left side of his head but did not lose consciousness. He has unable to get up so EMS was called and brought him to the emergency department. X-rays in the ED showed a left intertrochanteric femur fracture. Orthopedics was consulted for further evaluation. Patient states that at baseline he is able to walk about 100 ft at a time with his walker before being exhausted and needing to sit down. At baseline he has bilateral foot drop since the early 90's that he says is from a spinal L4-L5-S1 injury. He wears bilateral AFO braces for this. Patient denies any chest pain or shortness of breath. He denies any other pain or complaint at this time. Patient has a past medical history of CHF, COPD, Afib on Xarelto, bilateral foot drop, bilateral TKA revisions by Dr. Henry Tanner in 2015, Left femur IMN by Dr. Henry Tanner in 2015, and bilateral rotator cuff repairs roughly 10-15 years ago by Dr. Kyler Helms. Patient's last orthopedic surgery was at [facility].     Of note, patient was recently admitted to the hospital for abdominal pain and emesis and was treated for management of small bowel obstruction. Op note was found in care everywhere from placement of left femoral nail with left total knee arthroplasty poly revision. Surgery was 8/25/2015 with Dr. Henry Tanner. Total knee is a Biomet Maxim design and tibial polyethylene  With locking bar is 12mm thick.  There is also a closed locked IMN in retrograde fashion using synthes retrograde nail (380mm x 13mm diameter)    Past Medical History:    Past Medical History:   Diagnosis Date    Acute superficial gastritis without hemorrhage 05/26/2018    Anastomotic stricture of stomach     Asthma     Atrial fibrillation ([facility]     On Xarelto 6/27/2020    Calculus of gallbladder without cholecystitis without obstruction 05/02/2017    Chronic diastolic heart failure ([facility] 11/17/2017    Chronic rhinosinusitis 04/13/2015    Class 2 severe obesity due to excess calories with serious comorbidity and body mass index (BMI) of 36.0 to 36.9 in adult [facility]) 11/17/2017    COPD (chronic obstructive pulmonary disease) ([facility]     E. coli septicemia (HCC)     E. coli UTI     Foot drop, right 07/10/2012    Fracture of femur ([facility] 10/23/2016    Gait disorder 07/20/2016    Gastric out let obstruction     GERD (gastroesophageal reflux disease)     Hallux valgus, acquired, bilateral 06/24/2015    Hyperlipidemia     Hypertension     Impaired hearing 04/13/2015    Internal hemorrhoids 01/03/2017    Lymphedema of both lower extremities 06/24/2015    Nausea & vomiting 11/16/2018    OA (osteoarthritis) of knee, bilateral 12/11/2006    Obesity     MARIAMA on CPAP 05/02/2017    Osteoarthritis     Osteoarthritis of lumbar spine 12/13/2007     replace inactive diagnosis    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 coli (HCC)     Due to UTI     Sick sinus syndrome (HCC)     Simple chronic bronchitis ([facility] 04/10/2018    Slow transit constipation 11/03/2016    Unspecified sleep apnea     UTI (urinary tract infection)      Past Surgical History:    Past Surgical History:   Procedure Laterality Date    CARPAL TUNNEL RELEASE      bilateral    COLONOSCOPY      COLONOSCOPY N/A 04/05/2021    COLONOSCOPY DIAGNOSTIC performed by Adriano Watkins MD at [address]  01/02/2019    by Dr. Betito Landon N/A 01/02/2019    CYSTOSCOPY performed by Maria Luisa Joaquin MD at [address]    first knuckle right index finger    GASTRIC BYPASS SURGERY  1985    vertical banded gastroplasty   [address] JOINT REPLACEMENT  2004 & 2005    bilateral knees    PACEMAKER INSERTION  04/09/2021    [place], placed by Dr. Jenna Parkinson EGD [address] [facility] [address] N/A 08/16/2018    EGD FOREIGN BODY REMOVAL performed by Monet Schumacher MD at [address] EGD TRANSORAL BIOPSY SINGLE/MULTIPLE N/A 05/25/2018    EGD BIOPSY performed by Alana Montemayor DO at [address] [facility]      left shoulder    UPPER GASTROINTESTINAL ENDOSCOPY  08/13/2018    removal of food bolus    UPPER GASTROINTESTINAL ENDOSCOPY N/A 08/13/2018    EGD FOREIGN BODY REMOVAL performed by Alana Montemayor DO at [address] [place] [address] N/A 08/13/2018    EGD BIOPSY performed by Alana Montemayor DO at [address] [place] [address]  08/16/2018    egd with balloon dilitation    UPPER GASTROINTESTINAL ENDOSCOPY  08/16/2018    EGD DILATION BALLOON performed by Monet Schumacher MD at [address] [place] [address] 10/01/2018    EGD BIOPSY performed by Madisyn Casey MD at [address] [place] [address]  10/01/2018    EGD DILATION BALLOON performed by Madisyn Casey MD at [address] [place] [address] 11/19/2018    EGD DILATION BALLOON performed by Madisyn Casey MD at [address] [place] [address] 10/15/2020    EGD SUBMUCOSAL/BOTOX INJECTION tattoo  performed by Gwendolyn Peralta MD at [address] [place] [address] N/A 7/16/2021    EGD BIOPSY performed by Shelby Alamo MD at [address] [place] [address]     Medications Prior to Admission:   Prior to Admission medications    Medication Sig Start Date End Date Taking?  Authorizing Provider   benzonatate (TESSALON) 100 MG capsule TAKE 1 CAPSULE BY MOUTH THREE TIMES DAILY AS NEEDED FOR COUGH 1/26/22   JULIAN Ramirez - CNP   vitamin B-12 (CYANOCOBALAMIN) 100 MCG tablet TAKE 1 TABLET BY MOUTH DAILY AS NEEDED FOR FATIGUE 1/26/22   JULIAN Pérez CNP   docusate sodium (COLACE) 100 MG capsule TAKE 1 CAPSULE BY MOUTH TWICE DAILY AS NEEDED FOR CONSTIPATION 1/10/22   JULIAN Pérez CNP   bumetanide (BUMEX) 1 MG tablet TAKE 2 TABLETS BY MOUTH EVERY MORNING AND TAKE 1 TABLET BY MOUTH IN THE AFTERNOON AND 1 TABLET EVERY EVENING IF WEIGHT IS >3 ABOVE BASELINE 1/10/22   JULIAN Pérez CNP   cetirizine (ZYRTEC) 10 MG tablet TAKE 1 TABLET BY MOUTH DAILY 1/10/22   JULIAN Pérez CNP   metoprolol succinate (TOPROL XL) 50 MG extended release tablet TAKE 1 TABLET BY MOUTH AT BEDTIME 1/10/22   JULIAN Pérez CNP   CREON 30020-35502 units delayed release capsule TAKE 1 CAPSULE BY MOUTH THREE TIMES DAILY WITH MEALS 1/10/22   JULIAN Pérez CNP   rivaroxaban (XARELTO) 20 MG TABS tablet TAKE 1 TABLET BY MOUTH DAILY 1/10/22   JULIAN Pérez CNP   diclofenac sodium (VOLTAREN) 1 % GEL Apply 2 g topically 4 times daily as needed for Pain 1/10/22   Parminder Luis MD   fluticasone [facility]) 50 MCG/ACT nasal spray 2 sprays by Nasal route daily 12/29/21   JULIAN Pérez CNP   Multiple Vitamin (MULTI-VITAMINS) TABS TAKE 1 TABLET BY MOUTH DAILY 12/10/21   JULIAN Pérez CNP   potassium chloride (KLOR-CON M) 20 MEQ extended release tablet TAKE 1 TABLET BY MOUTH TWICE DAILY 12/10/21   JULIAN Pérez CNP   tiZANidine (ZANAFLEX) 4 MG tablet TAKE 1 TABLET BY MOUTH DAILY AS NEEDED 12/10/21   JULIAN Pérez CNP   rOPINIRole (REQUIP) 0.25 MG tablet TAKE 1 TABLET BY MOUTH TWICE DAILY 11/15/21   JULIAN Pérez CNP   pantoprazole (PROTONIX) 40 MG tablet TAKE 1 TABLET BY MOUTH DAILY 11/15/21   JULIAN Pérez CNP   tamsulosin [facility]) 0.4 MG capsule Take 1 capsule by mouth daily 10/19/21   Rahul Marleen, APRN - CNP   Fluticasone furoate-vilanterol (BREO ELLIPTA) 200-25 MCG/INH AEPB inhaler INHALE 1 PUFF INTO THE LUNGS DAILY **RINSE MOUTH AFTER EACH USE**    Historical Provider, MD   Nutritional Supplements (BOOST) LIQD 1 can twice a day for meals 7/28/21   JULIAN Torre - CNP   albuterol sulfate HFA (VENTOLIN HFA) 108 (90 Base) MCG/ACT inhaler Inhale 2 puffs into the lungs every 6 hours as needed for Wheezing or Shortness of Breath    Historical Provider, MD   carboxymethylcellulose (REFRESH PLUS) 0.5 % SOLN ophthalmic solution Apply 1 drop to eye 4 times daily    Historical Provider, MD   ascorbic acid (VITAMIN C) 500 MG tablet Take 1 tablet by mouth daily 10/26/20   Luana ADELINE Couch   ferrous sulfate (FE TABS 325) 325 (65 Fe) MG EC tablet Take 1 tablet by mouth 2 times daily (with meals) 10/16/20   Carlos A Hall PA-C   SM LUBRICANT EYE DROPS 0.4-0.3 % ophthalmic solution PLACE 1 DROP INTO BOTH EYES FOUR TIMES DAILY AS NEEDED FOR DRY EYES 10/17/19   Luana Couch PA-C   Handicap Placard MISC by Does not apply route 6/27/19   Luana Couch PA-C   Incontinence Supply Disposable (INCONTINENCE BRIEF LARGE) MISC To use as directed. Use 3x a day 4/30/19   Luana Couch PA-C   Foot Care Products (TRI-BALANCE ORTHOTICS MENS) MISC Provide insurance covered orthotics shoes, wear daily 12/12/18   Luana Couch PA-C   ipratropium-albuterol (DUONEB) 0.5-2.5 (3) MG/3ML SOLN nebulizer solution Inhale 1 vial into the lungs every 4 hours as needed     Historical Provider, MD   Armodafinil 200 MG TABS Take 1 tablet by mouth 2 times daily.   4/2/18   Historical Provider, MD     Allergies:    Darvocet [propoxyphene n-acetaminophen], Other, Oxycodone-acetaminophen, and Propoxyphene  Social History:   Social History     Socioeconomic History    Marital status:      Spouse name: Not on file    Number of children: Not on file    Years of education: Not on file    Highest education level: Not on file   Occupational History    Not on file   Tobacco Use    Smoking status: Former Smoker     Packs/day: 1.00     Years: 20.00 Pack years: 20.00     Quit date: 1976     Years since quittin.1    Smokeless tobacco: Never Used   Vaping Use    Vaping Use: Never used   Substance and Sexual Activity    Alcohol use: No    Drug use: No    Sexual activity: Not on file   Other Topics Concern    Not on file   Social History Narrative    Not on file     Social Determinants of Health     Financial Resource Strain: Low Risk     Difficulty of Paying Living Expenses: Not hard at all   Food Insecurity: No Food Insecurity    Worried About Running Out of Food in the Last Year: Never true    [place] of Food in the Last Year: Never true   Transportation Needs:     Lack of Transportation (Medical): Not on file    Lack of Transportation (Non-Medical): Not on file   Physical Activity:     Days of Exercise per Week: Not on file    Minutes of Exercise per Session: Not on file   Stress:     Feeling of Stress : Not on file   Social Connections:     Frequency of Communication with Friends and Family: Not on file    Frequency of Social Gatherings with Friends and Family: Not on file    Attends [employer] Services: Not on file    Active Member of [address] [place] [address] or Organizations: Not on file    Attends Club or Organization Meetings: Not on file    Marital Status: Not on file   Intimate Partner Violence:     Fear of Current or Ex-Partner: Not on file    Emotionally Abused: Not on file    Physically Abused: Not on file    Sexually Abused: Not on file   Housing Stability:     Unable to Pay for Housing in the Last Year: Not on file    Number of [place] in the Last Year: Not on file    Unstable Housing in the Last Year: Not on file     Family History:  Family History   Problem Relation Age of Onset    Cancer Mother     Heart Attack Father        ROS:   Constitutional: Negative for fever and chills. Respiratory: Negative for cough. Cardiovascular: Negative for chest pain.    Musculoskeletal: Positive for left hip, left shoulder, and left knee pain. Skin: Negative for itching and rash. Neurological: Negative for numbness, tingling, weakness. PE:  Blood pressure 110/69, pulse 70, temperature 98.1 °F (36.7 °C), temperature source Oral, resp. rate 22, height 5' 10\" (1.778 m), weight 184 lb (83.5 kg), SpO2 98 %. Gen: Alert and oriented, NAD, cooperative. Head: Normocephalic, atraumatic. Cardiovascular: Normal rate. Respiratory: Chest symmetric, no accessory muscle use. Pelvis: Stable to anterior and lateral compression without pain. RUE: Right index finger distal phalanx amputation, otherwise skin intact. No ecchymoses, abrasion, deformity, or lacerations. Tender to palpation at the proximal humerus. No crepitus. Compartments soft and easily compressible. Full ROM at the shoulder without pain. Full ROM at elbow without pain. Ulnar/median/AIN/PIN/radial motor intact. Axillary/MCN/median/ulnar/radial nerves SILT. Radial pulse 2+ with BCR.     LUE: Skin intact. No ecchymoses, abrasion, deformity, or lacerations. Non tender to palpation. No crepitus. Compartments soft and easily compressible. ROM difficult to examine due to baseline limitation. Limited ROM at shoulder with pain. Full ROM at elbow without pain. Ulnar/median/AIN/PIN/radial motor intact. Axillary/MCN/median/ulnar/radial nerves SILT. Radial pulse 2+ with BCR. RLE: Anterior surgical scar from prior R TKA. Skin intact. No ecchymoses, abrasions, deformity, or lacerations. Non tender to palpation. No crepitus. Compartments soft and easily compressible. EHL 0/5, TA 3/5, GS complex 4/5 motor intact. Sural/saphenous/SPN/DPN/plantar nerve distribution SILT. Patient has no groin pain with log roll maneuver. Lachman 1A, knee appears stable to varus and valgus stress test at 0 and 30 degrees. DP and PT pulses 2+ with BCR. LLE: Anterior surgical scar over knee from prior L TKA. Skin intact. No ecchymoses, abrasions, or lacerations.  Lower extremity shortened and externally rotated. Tender to palpation about the hip, greater trochanteric region, and lateral knee. No crepitus. Compartments soft and easily compressible. EHL 0/5, TA 4-/5, FHL/GS complex 4-/5. Sural/saphenous/SPN/DPN/plantar nerve distribution SILT. Deferred log roll secondary to known fracture. Knee ligament testing deferred due to injury and pain. DP and PT pulses 2+ with BCR. Labs:  Recent Labs     22  2228   WBC 6.3   HGB 11.3*   HCT 34.4*      INR 1.5      K 4.0   BUN 20   CREATININE 0.90   GLUCOSE 92        Imagin views of the left hip demonstrating a kathryn-implant intertrochanteric femur fracture. The fracture is rotated, in varus, and the lesser trochanter is displaced. Intramedullary nail noted up to the proximal femur. No dislocations noted. No Lytic or blastic lesions noted. Prior orthopedic hardware appreciated showing prior retrograde IMN as well as prior total knee arthroplasty. Assessment/Plan: 77 y.o. male who fell from standing height, being seen for:    -Left kathryn-implant intertrochanteric femur fracture      -Plan for surgical intervention pending clearance.  Would appreciate clearance notes once medically optimized for surgery   -We will obtain operative information regarding the TKA implant and femoral intramedullary nail for surgical planning  -WB status: NWB LLE  -Diet: NPO after midnight  -Please hold chemical AC in anticipation for surgery if medically able  -DVT ppx: EPC  -F/u VitD level  -Pain control per primary team  -Ice extremity for pain and swelling  -Please contact ortho with any questions      Ronn Sosa DO  Resident Physician, PGY-1   Orthopaedic Surgery  11:52 PM 2022    This note is created with the assistance of a speech recognition program. While intending to generate a document that actually reflects the content of the visit, the document can still have some errors including those of syntax and sound a like substitutions which may escape proof reading. In such instances, actual meaning can be extrapolated by contextual diversion. PGY-2 Addendum    Patient seen and examined. Agree with above. 77 y.o. male being seen after a fall from standing height in which he sustained a left intertrochanteric femur fracture. Of note, fracture is adjacent to prior placement of retrograde femur nail, thus making fracture a kathryn-implant intertrochanteric femur fracture. Denies any numbness or tingling down the extremities. States most of his pain is localized to his left side. Has extensive orthopedic history, having had bilateral rotator cuff repairs by Dr. Matt Delgado 10-15 years ago, a right total knee arthroplasty with revision approximately 6 years ago, a left total knee arthroplasty with poly exchange in 2015 with insertion of left femur IMN s/p fall. No other complaints today. Has baseline foot drop bilaterally. Patient appears uncomfortable in bed. Noted to be painful in left hip with any movement. Tenderness to palpation along lateral hip as well as along knee. DP pulse 2+ with digits warm and well perfused. Prior total knee arthroplasty scar present on anterior aspect of the knee. Assessment[de-identified]   77 y.o. male being seen after fall from standing height who sustained the following:   - Left kathryn-implant intertrochanteric femur fracture     Plan:   -To OR once medically stable to do so.  Would appreciate clearance notes from appropriate teams  -Please hold chemical anticoagulation until POD#1 if possible   -Ancef on call to OR   -NPO effective now in event we are able to complete definitive surgery   -Will discuss case with Dr. Francisca Angulo   -Please page ortho with any questions     Marlon Mcclelland DO, PGY-2  Orthopedic Surgery Resident  [facility], [facility], [facility]

## 2022-01-29 NOTE — FLOWSHEET NOTE
[address] [facility] [address]     Emergency/Trauma Note    PATIENT NAME: José Gil    Shift date: 1/28/2022  Shift day: Friday   Shift # 3    Room # ED17  Name: José Gil            Age: 77 y.o. Gender: male          Catholic: [address] [place] [address] of Cheondoism: Unknown    Trauma/Incident type: Adult Trauma Consult  Admit Date & Time: 1/28/2022 10:07 PM  TRAUMA NAME: N/A    ADVANCE DIRECTIVES IN CHART? No    NAME OF DECISION MAKER: Per next of kin hierarchy, [place] President ([phone]), is patient's primary decision maker. RELATIONSHIP OF DECISION MAKER TO PATIENT: Patient's Spouse    PATIENT/EVENT DESCRIPTION:  José Gil is a 77 y.o. male who arrived to ED17 and was paged out as an \"Adult Trauma Consult,\" due to a \"Fall. \" Per report, patient sustained a \"neck fracture. \" Patient was wearing a neck collar, when  visited. Pt to be admitted to 2006/2006-01. SPIRITUAL ASSESSMENT/INTERVENTION:   responded to page and gathered patient information outside room.  introduced herself to patient and learned about his well-being. Patient indicated that he remains in \"pain,\" tonight. Patient shared that he is \"short of breath\" and \"could use some oxygen. \" Patient's spouse was at bedside and was coping well throughout encounter.  informed care team of patient's breathing complaints. Patient and spouse thanked  for visit and support. PATIENT BELONGINGS:  No belongings noted    ANY BELONGINGS OF SIGNIFICANT VALUE NOTED:  N/A    REGISTRATION STAFF NOTIFIED? Yes      WHAT IS YOUR SPIRITUAL CARE PLAN FOR THIS PATIENT?:  [facility]plains can make follow-up visit, per request. Crystal Hunt can be reached 24/7 via [email or web address] ([email or web address]).      01/29/22 030   Encounter Summary   Services provided to: Patient and family together   Referral/Consult From: Multi-disciplinary team  (Adult Trauma Consult)   Support System Spouse   Continue Visiting   (1/28/2022)   Complexity of Encounter Low Length of Encounter 15 minutes   Spiritual Assessment Completed Yes   Routine   Type Initial   Crisis   Type Trauma   Assessment Approachable;Coping;Helplessness   Intervention Active listening;Explored feelings, thoughts, concerns;Explored coping resources;Nurtured hope;Sustaining presence/ Ministry of presence; Discussed illness/injury and it's impact   Outcome Comfort;Expressed gratitude;Coping;Receptive   Spiritual/Scientologist   Type Spiritual support     Electronically signed by Lupillo Samaniego on 1/29/2022 at 6:37 AM.  [facility]  [phone]

## 2022-01-29 NOTE — ED NOTES
Perfect served resident regarding pt is still vomiting and   that pt is reqeusting to have neck brace taking off.        Naren Ibrahim RN  01/29/22 [id number]

## 2022-01-29 NOTE — PROGRESS NOTES
Occupational [address] [employer]  Occupational Therapy Not Seen Note    DATE: 2022    NAME: Gabriela Zayas  MRN: [id number]   : 1945      Patient not seen this date for Occupational Therapy due to:       Strict Bedrest: Pt with noted femur fracture after fall- orthopedics planning for surgical intervention pending clearance.        Next Scheduled Treatment: Will check back and complete OT eval and tx session following OR    Electronically signed by Jacquelyn Lopez OT on 2022 at 4:24 PM

## 2022-01-29 NOTE — PROGRESS NOTES
Comprehensive Nutrition Assessment    Type and Reason for Visit:  Positive Nutrition Screen (wound)    Nutrition Recommendations/Plan:   -Continue with current diet, monitor/encourage adequate intake  -Add Ensure nutrition supplement to meal trays    Nutrition Assessment:  Pt admitted s/p fall, L-intertrochanteric femur fracture. Covid+. Unable to obtain nutrition history, wt history reflects significant wt loss of 11% over past 3 months. Noted stage 2 pressure ulcer-coccyx    Malnutrition Assessment:  Malnutrition Status:  Mild malnutrition    Context:  Chronic Illness     Findings of the 6 clinical characteristics of malnutrition:  Energy Intake:  Unable to assess  Weight Loss:  7 - Greater than 7.5% over 3 months     Body Fat Loss:  Unable to assess     Muscle Mass Loss:  Unable to assess    Fluid Accumulation:  Unable to assess     Strength:  Not Performed    Estimated Daily Nutrient Needs:  Energy (kcal):  1628-9880 kcal/d (28-30 kcal/kg); Weight Used for Energy Requirements:  Current     Protein (g):  90-115gm pro/d (1.2-1.5gm/kg); Weight Used for Protein Requirements:  Current        Fluid (ml/day):  1900ml/d (25ml/kg); Method Used for Fluid Requirements:  ml/Kg      Nutrition Related Findings:  labs/meds reviewed      Wounds:  Stage II (coccyx)       Current Nutrition Therapies:    Diet NPO Exceptions are: Sips of Water with Meds  ADULT DIET;  Regular    Anthropometric Measures:  · Height: 5' 10\" (177.8 cm)  · Current Body Weight: 170 lb 13.7 oz (77.5 kg)     · Ideal Body Weight: 166 lbs; % Ideal Body Weight 102.9 %   · BMI: 24.5  · BMI Categories: Normal Weight (BMI 22.0 to 24.9) age over 72       Nutrition Diagnosis:   · Increased nutrient needs related to increase demand for energy/nutrients as evidenced by wounds      Nutrition Interventions:   Food and/or Nutrient Delivery:  Continue Current Diet,Start Oral Nutrition Supplement  Nutrition Education/Counseling:  No recommendation at this time Coordination of Nutrition Care:  Continue to monitor while inpatient    Goals:  Meet 50% or greater of estimated nutrition needs       Nutrition Monitoring and Evaluation:   Behavioral-Environmental Outcomes:  None Identified   Food/Nutrient Intake Outcomes:  Food and Nutrient Intake,Supplement Intake  Physical Signs/Symptoms Outcomes:  Biochemical Data,Nutrition Focused Physical Findings,Skin,Weight,Meal Time Behavior     Discharge Planning:     Too soon to determine     Electronically signed by Alexa Loco RD, LD on 1/29/22 at 3:00 PM EST    Contact: [phone]

## 2022-01-29 NOTE — ED PROVIDER NOTES
FACULTY SIGN-OUT  ADDENDUM     Care of this patient was assumed from previous attending physician. The patient's initial evaluation and plan have been discussed with the prior provider who initially evaluated the patient. Attestation  I was available and discussed any additional care issues that arose and coordinated the management plans with the resident(s) caring for the patient during my duty period. Any areas of disagreement with resident's documentation of care or procedures are noted on the chart. I was personally present for the key portions of any/all procedures, during my duty period. I have documented in the chart those procedures where I was not present during the key portions. ED COURSE      The patient was given the following medications:  Orders Placed This Encounter   Medications    fentaNYL (SUBLIMAZE) injection 25 mcg    0.9 % sodium chloride bolus    orphenadrine (NORFLEX) injection 60 mg    ceFAZolin (ANCEF) 2000 mg in dextrose 5 % 50 mL IVPB     Order Specific Question:   Antimicrobial Indications     Answer:   Surgical Prophylaxis    iopamidol (ISOVUE-370) 76 % injection 75 mL    fentaNYL (SUBLIMAZE) injection 25 mcg    ondansetron (ZOFRAN) injection 4 mg    ondansetron (ZOFRAN) 4 MG/2ML injection     Devorah Arias: cabinet override       RECENT VITALS:   Temp: 98.1 °F (36.7 °C), Pulse: 70, Resp: 16, BP: (!) 101/59    MEDICAL DECISION MAKING        Justino Rubio is a 77 y.o. male who presents to the Emergency Department with complaints of L hip fx after fall. Ortho consulted, trauma admitting.       Jael Roldan MD  Attending Emergency Physician    (Please note that portions of this note were completed with a voice recognition program.  Efforts were made to edit the dictations but occasionally words are mis-transcribed.)          Jael Roldan MD  01/29/22 6318

## 2022-01-29 NOTE — CONSULTS
Attestation signed by      Attending Physician Statement:    . Additional Comments: Patient presented with hip fracture. No exertional chest pain or shortness of breath. Has history of chronic A. fib. Echocardiogram reviewed and his ejection fraction is 50 to 55% with no significant valvular disease. Okay to hold anticoagulation and proceed with hip surgery with intermediate cardiac risk. [facility]   Consultation Note               Today's Date: 1/29/2022  Patient Name: Benedicto Howard  Date of admission: 1/28/2022 10:07 PM  Patient's age: 77 y. o., 1945  Admission Dx: Left displaced femoral neck fracture ([address] Morelia Banks  Fall at home, initial encounter [W19. Helen Riggs, X15.651]    Reason for Consult:  Pre-op risk stratification    Requesting Physician: Will Villar MD    CHIEF COMPLAINT:    Chief Complaint   Patient presents with   Andreas Close    Hip Pain       History Obtained From:  patient, electronic medical record    HISTORY OF PRESENT ILLNESS:      The patient is a 77 y.o.  male who was admitted to the hospital after a fall at home. Patient reported that he was trying to reach for his walker when he lost his balance and. He denies any symptoms of dizziness prior to the episode. Patient arrived to the ED and evaluation showed acute fracture of the left femoral neck. Cardiology was consulted for further evaluation for preoperative risk stratification prior to surgery. Patient has a significant history of chronic atrial fibrillation on Xarelto for anticoagulation has a pacemaker in place.         Past Medical History:   has a past medical history of Acute superficial gastritis without hemorrhage, Anastomotic stricture of stomach, Asthma, Atrial fibrillation ([address] Calculus of gallbladder without cholecystitis without obstruction, Chronic diastolic heart failure ([address] Chronic rhinosinusitis, Class 2 severe obesity due to excess calories with serious comorbidity and body mass index (BMI) of 36.0 to 36.9 in adult [facility]), COPD (chronic obstructive pulmonary disease) (HCC), E. coli septicemia ([facility] E. coli UTI, Foot drop, right, Fracture of femur ([facility] Gait disorder, Gastric out let obstruction, GERD (gastroesophageal reflux disease), Hallux valgus, acquired, bilateral, Hyperlipidemia, Hypertension, Impaired hearing, Internal hemorrhoids, Lymphedema of both lower extremities, Nausea & vomiting, OA (osteoarthritis) of knee, bilateral, Obesity, MARIAMA on CPAP, Osteoarthritis, Osteoarthritis of lumbar spine, S/P revision of total knee, Septicemia due to E. coli (HCC), Sick sinus syndrome (HCC), Simple chronic bronchitis (HCC), Slow transit constipation, Unspecified sleep apnea, and UTI (urinary tract infection). Past Surgical History:   has a past surgical history that includes Finger amputation (1966); Gastric bypass surgery (1985); Carpal tunnel release; Colonoscopy; Rotator cuff repair; joint replacement (2004 & 2005); Hemorrhoid surgery; pr egd transoral biopsy single/multiple (N/A, 05/25/2018); Upper gastrointestinal endoscopy (08/13/2018); Upper gastrointestinal endoscopy (N/A, 08/13/2018); Upper gastrointestinal endoscopy (N/A, 08/13/2018); Upper gastrointestinal endoscopy (08/16/2018); pr egd flexible foreign body removal (N/A, 08/16/2018); Upper gastrointestinal endoscopy (08/16/2018); Upper gastrointestinal endoscopy (N/A, 10/01/2018); Upper gastrointestinal endoscopy (10/01/2018); Upper gastrointestinal endoscopy (N/A, 11/19/2018); Cystoscopy (01/02/2019); Cystoscopy (N/A, 01/02/2019); Upper gastrointestinal endoscopy (N/A, 10/15/2020); Colonoscopy (N/A, 04/05/2021); Pacemaker insertion (04/09/2021); and Upper gastrointestinal endoscopy (N/A, 7/16/2021). Home Medications:    Prior to Admission medications    Medication Sig Start Date End Date Taking?  Authorizing Provider   benzonatate (TESSALON) 100 MG capsule TAKE 1 CAPSULE BY MOUTH THREE TIMES DAILY AS NEEDED FOR COUGH 1/26/22  Yes JULIAN Manriquez CNP   vitamin B-12 (CYANOCOBALAMIN) 100 MCG tablet TAKE 1 TABLET BY MOUTH DAILY AS NEEDED FOR FATIGUE 1/26/22  Yes JULIAN Manriquez CNP   docusate sodium (COLACE) 100 MG capsule TAKE 1 CAPSULE BY MOUTH TWICE DAILY AS NEEDED FOR CONSTIPATION 1/10/22  Yes JULIAN Manriquez CNP   bumetanide (BUMEX) 1 MG tablet TAKE 2 TABLETS BY MOUTH EVERY MORNING AND TAKE 1 TABLET BY MOUTH IN THE AFTERNOON AND 1 TABLET EVERY EVENING IF WEIGHT IS >3 ABOVE BASELINE 1/10/22  Yes JULIAN Manriquez CNP   cetirizine (ZYRTEC) 10 MG tablet TAKE 1 TABLET BY MOUTH DAILY 1/10/22  Yes JULAIN Manriquez CNP   metoprolol succinate (TOPROL XL) 50 MG extended release tablet TAKE 1 TABLET BY MOUTH AT BEDTIME 1/10/22  Yes JULIAN Manriquez CNP   CREON [address]15-67128 units delayed release capsule TAKE 1 CAPSULE BY MOUTH THREE TIMES DAILY WITH MEALS 1/10/22  Yes JULIAN Manriquez CNP   rivaroxaban (XARELTO) 20 MG TABS tablet TAKE 1 TABLET BY MOUTH DAILY 1/10/22  Yes JULIAN Manriquez CNP   diclofenac sodium (VOLTAREN) 1 % GEL Apply 2 g topically 4 times daily as needed for Pain 1/10/22  Yes Waldemar Garcia MD   fluticasone [facility]) 50 MCG/ACT nasal spray 2 sprays by Nasal route daily 12/29/21  Yes JULIAN Manriquez CNP   Multiple Vitamin (MULTI-VITAMINS) TABS TAKE 1 TABLET BY MOUTH DAILY 12/10/21  JULIAN Hinson CNP   potassium chloride (KLOR-CON M) 20 MEQ extended release tablet TAKE 1 TABLET BY MOUTH TWICE DAILY 12/10/21  JULIAN Hinson CNP   tiZANidine (ZANAFLEX) 4 MG tablet TAKE 1 TABLET BY MOUTH DAILY AS NEEDED 12/10/21  Yes JULIAN Manriquez CNP   rOPINIRole (REQUIP) 0.25 MG tablet TAKE 1 TABLET BY MOUTH TWICE DAILY 11/15/21  Yes JULIAN Manriquez CNP   pantoprazole (PROTONIX) 40 MG tablet TAKE 1 TABLET BY MOUTH DAILY 11/15/21  Yes JULIAN Manriquez CNP   tamsulosin (FLOMAX) 0.4 MG capsule Take 1 capsule by mouth daily 10/19/21  Yes JULIAN Amos CNP   Fluticasone furoate-vilanterol (BREO ELLIPTA) 200-25 MCG/INH AEPB inhaler INHALE 1 PUFF INTO THE LUNGS DAILY **RINSE MOUTH AFTER EACH USE**   Yes Historical Provider, MD   Nutritional Supplements (BOOST) LIQD 1 can twice a day for meals 7/28/21  Yes JULIAN Amos CNP   albuterol sulfate HFA (VENTOLIN HFA) 108 (90 Base) MCG/ACT inhaler Inhale 2 puffs into the lungs every 6 hours as needed for Wheezing or Shortness of Breath   Yes Historical Provider, MD   carboxymethylcellulose (REFRESH PLUS) 0.5 % SOLN ophthalmic solution Apply 1 drop to eye 4 times daily   Yes Historical Provider, MD   ascorbic acid (VITAMIN C) 500 MG tablet Take 1 tablet by mouth daily 10/26/20  Yes Theodore Gill PA-C   ferrous sulfate (FE TABS 325) 325 (65 Fe) MG EC tablet Take 1 tablet by mouth 2 times daily (with meals) 10/16/20  Yes Gaviota Escobedo PA-C   SM LUBRICANT EYE DROPS 0.4-0.3 % ophthalmic solution PLACE 1 DROP INTO BOTH EYES FOUR TIMES DAILY AS NEEDED FOR DRY EYES 10/17/19  Yes Theodore Gill PA-C   Incontinence Supply Disposable (INCONTINENCE BRIEF LARGE) MISC To use as directed. Use 3x a day 4/30/19  Yes Theodore Gill PA-C   ipratropium-albuterol (DUONEB) 0.5-2.5 (3) MG/3ML SOLN nebulizer solution Inhale 1 vial into the lungs every 4 hours as needed    Yes Historical Provider, MD   Armodafinil 200 MG TABS Take 1 tablet by mouth 2 times daily.   4/2/18  Yes Historical Provider, MD   Handarpita Mora [address] [facility] by Does not apply route 6/27/19   Theodore Gill PA-C   Foot Care Products (TRI-BALANCE ORTHOTICS MENS) [address] [facility] Provide insurance covered orthotics shoes, wear daily 12/12/18   Theodore Gill PA-C      Current Facility-Administered Medications: ceFAZolin (ANCEF) 2000 mg in dextrose 5 % 50 mL IVPB, 2,000 mg, IntraVENous, On Call to OR  sodium chloride flush 0.9 % injection 5-40 mL, 5-40 mL, IntraVENous, 2 times per day  sodium chloride flush 0.9 % injection 5-40 mL, 5-40 mL, IntraVENous, PRN  0.9 % sodium chloride infusion, 25 mL, IntraVENous, PRN  ondansetron (ZOFRAN-ODT) disintegrating tablet 4 mg, 4 mg, Oral, Q8H PRN **OR** ondansetron (ZOFRAN) injection 4 mg, 4 mg, IntraVENous, Q6H PRN  polyethylene glycol (GLYCOLAX) packet 17 g, 17 g, Oral, Daily  lactated ringers infusion, , IntraVENous, Continuous  gabapentin (NEURONTIN) capsule 300 mg, 300 mg, Oral, Q8H  ibuprofen (ADVIL;MOTRIN) tablet 400 mg, 400 mg, Oral, Q4H  methocarbamol (ROBAXIN) tablet 750 mg, 750 mg, Oral, Q6H      Allergies:  Darvocet [propoxyphene n-acetaminophen], Other, Oxycodone-acetaminophen, and Propoxyphene      Social History:   reports that he quit smoking about 45 years ago. He has a 20.00 pack-year smoking history. He has never used smokeless tobacco. He reports that he does not drink alcohol and does not use drugs. Family History: family history includes Cancer in his mother; Heart Attack in his father. No h/o sudden cardiac death. No for premature CAD      REVIEW OF SYSTEMS:    Deferred due to active covid infection      PHYSICAL EXAM:      /68   Pulse 78   Temp 98.8 °F (37.1 °C) (Oral)   Resp 20   Ht 5' 10\" (1.778 m)   Wt 170 lb 14.4 oz (77.5 kg)   SpO2 95%   BMI 24.52 kg/m²    Deferred due to active Covid infection. Referred to primary team's note      DATA:    Diagnostics:    Labs:     CBC:   Recent Labs     01/28/22 2228 01/29/22  0644   WBC 6.3 7.0   HGB 11.3* 11.1*   HCT 34.4* 33.9*    198     BMP:   Recent Labs     01/28/22 2228 01/29/22  0644    137   K 4.0 3.9   CO2 26 24   BUN 20 17   CREATININE 0.90 0.81   LABGLOM >60 >60   GLUCOSE 92 101*     BNP: No results for input(s): BNP in the last 72 hours. PT/INR:   Recent Labs     01/28/22  2228   PROTIME 15.9*   INR 1.5     APTT:  Recent Labs     01/28/22 2228   APTT 31.6*     CARDIAC ENZYMES:  No results for input(s): TROPHS in the last 72 hours.   No results for input(s): CKTOTAL, CKMB,

## 2022-01-29 NOTE — PLAN OF CARE
Problem: Nutrition  Goal: Optimal nutrition therapy  Outcome: Ongoing  Note: Nutrition Problem #1: Increased nutrient needs  Intervention: Food and/or Nutrient Delivery: Continue Current Diet,Start Oral Nutrition Supplement  Nutritional Goals: Meet 50% or greater of estimated nutrition needs

## 2022-01-29 NOTE — CONSULTS
[facility] -   Infectious diseases evaluation  admission date 1/28/2022    reason for consultation:   covid    Impression :   Current:  · Left hip intertrochanteric fracture after a fall  · Covid positive, no hypoxemia  · Right lower lobe atelectatic lesions, less likely Covid related  · Underlying COPD  Other:  ·   Discussion / summary of stay / plan of care   ·   Recommendations   Low  CRP and ferritin  sotrivimab for asymptomatic Covid in a high risk patient  2 L NC for copd    Infection Control Recommendations   · [place] Precautions  · Contact Isolation   · Airborne isolation  · Droplet Isolation      Antimicrobial Stewardship Recommendations   · Off ab    Coordination ofOutpatient Care:   · Estimated Length of IV antimicrobials:  · Patient will need Midline / picc Catheter Insertion:   · Patient will need SNF:  · Patient will need outpatient wound care:     History of Present Illness:   Initial history:  Mike Gillette is a 77y.o.-year-old male fell and broke his hip, left intertrochanteric, Ortho on board planning surgical repair. , found to have Covid positive, underlying COPD  Infectious consulted due to Covid positive    Patient on no oxygen  CT scan of the chest reviewed personally, right lower lobe small atelectasis areas, but cannot rule out Covid related-those lesions seem to be isolated in that area, possibly suggestive of atelectasis    Also has A. fib, and has a pacemaker, EF 50 and no valvular disease    Interval changes  1/29/2022   Patient Vitals for the past 8 hrs:   BP Temp Temp src Pulse Resp Height Weight   01/29/22 1000 -- 98.6 °F (37 °C) Oral -- -- -- --   01/29/22 0545 115/68 98.8 °F (37.1 °C) Oral 78 20 5' 10\" (1.778 m) 170 lb 14.4 oz (77.5 kg)       Summary of relevant labs:  Labs:   W 7  Creat  0.8  CRP 15  Ferritin  274  Micro:  covid +  Imaging:  Chest x-ray negative    CT abdomen pelvis and chest  Right lower lobe small atelectasis, cannot rule out early COVID pneumonia        I have personally reviewed the past medical history, past surgical history, medications, social history, and family history, and I haveupdated the database accordingly. Allergies:   Darvocet [propoxyphene n-acetaminophen], Other, Oxycodone-acetaminophen, and Propoxyphene     Review of Systems:     Review of Systems   Constitutional: Positive for activity change. HENT: Negative for congestion. Eyes: Negative for discharge. Respiratory: Negative for apnea. Cardiovascular: Negative for chest pain. Gastrointestinal: Positive for nausea. Negative for abdominal distention. Endocrine: Negative for polydipsia. Genitourinary: Negative for dysuria. Musculoskeletal: Positive for arthralgias. Skin: Negative for color change. Allergic/Immunologic: Negative for immunocompromised state. Neurological: Negative for dizziness. Hematological: Negative for adenopathy. Psychiatric/Behavioral: Negative for agitation. Physical Examination :       Physical Exam  Constitutional:       Appearance: Normal appearance. He is not ill-appearing. HENT:      Head: Normocephalic and atraumatic. Mouth/Throat:      Mouth: Mucous membranes are moist.   Eyes:      General: No scleral icterus. Conjunctiva/sclera: Conjunctivae normal.   Cardiovascular:      Rate and Rhythm: Normal rate and regular rhythm. Heart sounds: Normal heart sounds. No friction rub. Pulmonary:      Breath sounds: No stridor. No rhonchi. Abdominal:      Palpations: There is no mass. Hernia: No hernia is present. Genitourinary:     Comments: URINE DANNY  Musculoskeletal:         General: No swelling or deformity. Cervical back: Neck supple. No rigidity. Skin:     General: Skin is dry. Coloration: Skin is not jaundiced. Neurological:      General: No focal deficit present. Mental Status: He is alert. Mental status is at baseline.    Psychiatric:         Mood and Affect: Mood normal.         Thought Content:  Thought content normal.         Past Medical History:     Past Medical History:   Diagnosis Date    Acute superficial gastritis without hemorrhage 05/26/2018    Anastomotic stricture of stomach     Asthma     Atrial fibrillation ([address]     On Xarelto 6/27/2020    Calculus of gallbladder without cholecystitis without obstruction 05/02/2017    Chronic diastolic heart failure ([address] 11/17/2017    Chronic rhinosinusitis 04/13/2015    Class 2 severe obesity due to excess calories with serious comorbidity and body mass index (BMI) of 36.0 to 36.9 in adult [facility]) 11/17/2017    COPD (chronic obstructive pulmonary disease) ([address]     E. coli septicemia ([facility])     E. coli UTI     Foot drop, right 07/10/2012    Fracture of femur ([address] 10/23/2016    Gait disorder 07/20/2016    Gastric out let obstruction     GERD (gastroesophageal reflux disease)     Hallux valgus, acquired, bilateral 06/24/2015    Hyperlipidemia     Hypertension     Impaired hearing 04/13/2015    Internal hemorrhoids 01/03/2017    Lymphedema of both lower extremities 06/24/2015    Nausea & vomiting 11/16/2018    OA (osteoarthritis) of knee, bilateral 12/11/2006    Obesity     MARIAMA on CPAP 05/02/2017    Osteoarthritis     Osteoarthritis of lumbar spine 12/13/2007     replace inactive diagnosis    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 coli ([address]     Due to UTI     Sick sinus syndrome (HCC)     Simple chronic bronchitis ([address] 04/10/2018    Slow transit constipation 11/03/2016    Unspecified sleep apnea     UTI (urinary tract infection)        Past Surgical  History:     Past Surgical History:   Procedure Laterality Date    CARPAL TUNNEL RELEASE      bilateral    COLONOSCOPY      COLONOSCOPY N/A 04/05/2021    COLONOSCOPY DIAGNOSTIC performed by Tash Crowe MD at [address] [place] [address] Drive  01/02/2019    by Dr. Janel Jones N/A 01/02/2019 CYSTOSCOPY performed by Vanna Lewis MD at [id number]    first knuckle right index finger   [address] [facility]    vertical banded gastroplasty   [address] REPLACEMENT  2004 & 2005    bilateral knees    PACEMAKER INSERTION  04/09/2021    [place], placed by Dr. Jenna Parkinson EGD [address] N/A 08/16/2018    EGD FOREIGN BODY REMOVAL performed by Monet Schumacher MD [address] EGD TRANSORAL BIOPSY SINGLE/MULTIPLE N/A 05/25/2018    EGD BIOPSY performed by Alana Montemayor DO at [address] [facility]      left shoulder    UPPER GASTROINTESTINAL ENDOSCOPY  08/13/2018    removal of food bolus    UPPER GASTROINTESTINAL ENDOSCOPY N/A 08/13/2018    EGD FOREIGN BODY REMOVAL performed by Alana Montemayor DO at [address] [facility] N/A 08/13/2018    EGD BIOPSY performed by Alana Montemayor DO at [address] [facility]  08/16/2018    egd with balloon dilitation    UPPER GASTROINTESTINAL ENDOSCOPY  08/16/2018    EGD DILATION BALLOON performed by Monet Schumacher MD at [facility] 10/01/2018    EGD BIOPSY performed by Madisyn Casey MD at [address] [place] [address]  10/01/2018    EGD DILATION BALLOON performed by Madisyn Casey MD at [address] [place] [address] 11/19/2018    EGD DILATION BALLOON performed by Madisyn Casey MD at [address] [place] [address] 10/15/2020    EGD SUBMUCOSAL/BOTOX INJECTION tattoo  performed by Gwendolyn Peralta MD at [address] [place] [address] 7/16/2021    EGD BIOPSY performed by Shelby Alamo MD at [address] [place] [address]       Medications:      sodium chloride flush  5-40 mL IntraVENous 2 times per day    polyethylene glycol  17 g Oral Daily    gabapentin  300 mg Oral Q8H    ibuprofen  400 mg Oral Q4H  methocarbamol  750 mg Oral Q6H       Social History:     Social History     Socioeconomic History    Marital status:      Spouse name: Not on file    Number of children: Not on file    Years of education: Not on file    Highest education level: Not on file   Occupational History    Not on file   Tobacco Use    Smoking status: Former Smoker     Packs/day: 1.00     Years: 20.00     Pack years: 20.00     Quit date: 1976     Years since quittin.1    Smokeless tobacco: Never Used   Vaping Use    Vaping Use: Never used   Substance and Sexual Activity    Alcohol use: No    Drug use: No    Sexual activity: Not on file   Other Topics Concern    Not on file   Social History Narrative    Not on file     Social Determinants of Health     Financial Resource Strain: Low Risk     Difficulty of Paying Living Expenses: Not hard at all   Food Insecurity: No Food Insecurity    Worried About [address] [employer] in the Last Year: Never true    [facility] in the Last Year: Never true   Transportation Needs:     Lack of Transportation (Medical): Not on file    Lack of Transportation (Non-Medical):  Not on file   Physical Activity:     Days of Exercise per Week: Not on file    Minutes of Exercise per Session: Not on file   Stress:     Feeling of Stress : Not on file   Social Connections:     Frequency of Communication with Friends and Family: Not on file    Frequency of Social Gatherings with Friends and Family: Not on file    Attends Bahai Services: Not on file    Active Member of Clubs or Organizations: Not on file    Attends Club or Organization Meetings: Not on file    Marital Status: Not on file   Intimate Partner Violence:     Fear of Current or Ex-Partner: Not on file    Emotionally Abused: Not on file    Physically Abused: Not on file    Sexually Abused: Not on file   Housing Stability:     Unable to Pay for Housing in the Last Year: Not on file    Number of Places Lived in the Last Year: Not on file    Unstable Housing in the Last Year: Not on file       Family History:     Family History   Problem Relation Age of Onset    Cancer Mother     Heart Attack Father       Medical Decision Making:   I have independently reviewed/ordered the following labs:    CBC with Differential:   Recent Labs     01/28/22 2228 01/29/22  0644   WBC 6.3 7.0   HGB 11.3* 11.1*   HCT 34.4* 33.9*    198   LYMPHOPCT  --  8*   MONOPCT  --  6     BMP:  Recent Labs     01/28/22 2228 01/29/22  0644    137   K 4.0 3.9    102   CO2 26 24   BUN 20 17   CREATININE 0.90 0.81     Hepatic Function Panel: No results for input(s): PROT, LABALBU, BILIDIR, IBILI, BILITOT, ALKPHOS, ALT, AST in the last 72 hours. No results for input(s): RPR in the last 72 hours. No results for input(s): HIV in the last 72 hours. No results for input(s): BC in the last 72 hours. Lab Results   Component Value Date    CREATININE 0.81 01/29/2022    GLUCOSE 101 01/29/2022    GLUCOSE 99 09/13/2011       Detailed results: Thank you for allowing us to participate in the care of this patient. Please call with questions. This note is created with the assistance of a speech recognition program.  While intending to generate adocument that actually reflects the content of the visit, the document can still have some errors including those of syntax and sound a like substitutions which may escape proof reading. It such instances, actual meaningcan be extrapolated by contextual diversion.     Kenyatta Hodge MD  Office: [phone]  Perfect serve / office [phone]

## 2022-01-29 NOTE — ED NOTES
Perfect served attending regarding pt is still vomiting and   that pt is reqeusting to have neck brace taking off.        Deb Rainey, RACHEL  01/29/22 [address], RN  01/29/22 7944

## 2022-01-30 ENCOUNTER — APPOINTMENT (OUTPATIENT)
Dept: GENERAL RADIOLOGY | Age: 77
DRG: 463 | End: 2022-01-30
Payer: MEDICARE

## 2022-01-30 VITALS — OXYGEN SATURATION: 100 % | TEMPERATURE: 97.6 F | SYSTOLIC BLOOD PRESSURE: 115 MMHG | DIASTOLIC BLOOD PRESSURE: 72 MMHG

## 2022-01-30 LAB
ABSOLUTE EOS #: 0.22 K/UL (ref 0–0.44)
ABSOLUTE IMMATURE GRANULOCYTE: 0.03 K/UL (ref 0–0.3)
ABSOLUTE LYMPH #: 0.85 K/UL (ref 1.1–3.7)
ABSOLUTE MONO #: 0.5 K/UL (ref 0.1–1.2)
ANION GAP SERPL CALCULATED.3IONS-SCNC: 8 MMOL/L (ref 9–17)
BASOPHILS # BLD: 0 % (ref 0–2)
BASOPHILS ABSOLUTE: <0.03 K/UL (ref 0–0.2)
BUN BLDV-MCNC: 13 MG/DL (ref 8–23)
BUN/CREAT BLD: ABNORMAL (ref 9–20)
CALCIUM SERPL-MCNC: 8.5 MG/DL (ref 8.6–10.4)
CHLORIDE BLD-SCNC: 101 MMOL/L (ref 98–107)
CO2: 28 MMOL/L (ref 20–31)
CREAT SERPL-MCNC: 0.73 MG/DL (ref 0.7–1.2)
DIFFERENTIAL TYPE: ABNORMAL
EOSINOPHILS RELATIVE PERCENT: 4 % (ref 1–4)
GFR AFRICAN AMERICAN: >60 ML/MIN
GFR NON-AFRICAN AMERICAN: >60 ML/MIN
GFR SERPL CREATININE-BSD FRML MDRD: ABNORMAL ML/MIN/{1.73_M2}
GFR SERPL CREATININE-BSD FRML MDRD: ABNORMAL ML/MIN/{1.73_M2}
GLUCOSE BLD-MCNC: 103 MG/DL (ref 70–99)
HCT VFR BLD CALC: 30.4 % (ref 40.7–50.3)
HCT VFR BLD CALC: 32.5 % (ref 40.7–50.3)
HEMOGLOBIN: 10.4 G/DL (ref 13–17)
HEMOGLOBIN: 9.8 G/DL (ref 13–17)
IMMATURE GRANULOCYTES: 1 %
LYMPHOCYTES # BLD: 14 % (ref 24–43)
MCH RBC QN AUTO: 31.1 PG (ref 25.2–33.5)
MCHC RBC AUTO-ENTMCNC: 32 G/DL (ref 28.4–34.8)
MCV RBC AUTO: 97.3 FL (ref 82.6–102.9)
MONOCYTES # BLD: 8 % (ref 3–12)
NRBC AUTOMATED: 0 PER 100 WBC
PDW BLD-RTO: 13.1 % (ref 11.8–14.4)
PLATELET # BLD: 159 K/UL (ref 138–453)
PLATELET ESTIMATE: ABNORMAL
PMV BLD AUTO: 9.5 FL (ref 8.1–13.5)
POTASSIUM SERPL-SCNC: 3.7 MMOL/L (ref 3.7–5.3)
RBC # BLD: 3.34 M/UL (ref 4.21–5.77)
RBC # BLD: ABNORMAL 10*6/UL
SEG NEUTROPHILS: 73 % (ref 36–65)
SEGMENTED NEUTROPHILS ABSOLUTE COUNT: 4.65 K/UL (ref 1.5–8.1)
SODIUM BLD-SCNC: 137 MMOL/L (ref 135–144)
WBC # BLD: 6.3 K/UL (ref 3.5–11.3)
WBC # BLD: ABNORMAL 10*3/UL

## 2022-01-30 PROCEDURE — 3600000014 HC SURGERY LEVEL 4 ADDTL 15MIN: Performed by: ORTHOPAEDIC SURGERY

## 2022-01-30 PROCEDURE — 2500000003 HC RX 250 WO HCPCS: Performed by: NURSE ANESTHETIST, CERTIFIED REGISTERED

## 2022-01-30 PROCEDURE — 2580000003 HC RX 258: Performed by: STUDENT IN AN ORGANIZED HEALTH CARE EDUCATION/TRAINING PROGRAM

## 2022-01-30 PROCEDURE — 20680 REMOVAL OF IMPLANT DEEP: CPT | Performed by: ORTHOPAEDIC SURGERY

## 2022-01-30 PROCEDURE — 2720000010 HC SURG SUPPLY STERILE: Performed by: ORTHOPAEDIC SURGERY

## 2022-01-30 PROCEDURE — 80048 BASIC METABOLIC PNL TOTAL CA: CPT

## 2022-01-30 PROCEDURE — 0QP704Z REMOVAL OF INTERNAL FIXATION DEVICE FROM LEFT UPPER FEMUR, OPEN APPROACH: ICD-10-PCS | Performed by: ORTHOPAEDIC SURGERY

## 2022-01-30 PROCEDURE — C1769 GUIDE WIRE: HCPCS | Performed by: ORTHOPAEDIC SURGERY

## 2022-01-30 PROCEDURE — 7100000001 HC PACU RECOVERY - ADDTL 15 MIN: Performed by: ORTHOPAEDIC SURGERY

## 2022-01-30 PROCEDURE — 6370000000 HC RX 637 (ALT 250 FOR IP): Performed by: STUDENT IN AN ORGANIZED HEALTH CARE EDUCATION/TRAINING PROGRAM

## 2022-01-30 PROCEDURE — 3700000000 HC ANESTHESIA ATTENDED CARE: Performed by: ORTHOPAEDIC SURGERY

## 2022-01-30 PROCEDURE — 2580000003 HC RX 258: Performed by: ORTHOPAEDIC SURGERY

## 2022-01-30 PROCEDURE — 6360000002 HC RX W HCPCS: Performed by: STUDENT IN AN ORGANIZED HEALTH CARE EDUCATION/TRAINING PROGRAM

## 2022-01-30 PROCEDURE — 99232 SBSQ HOSP IP/OBS MODERATE 35: CPT | Performed by: ORTHOPAEDIC SURGERY

## 2022-01-30 PROCEDURE — 2709999900 HC NON-CHARGEABLE SUPPLY: Performed by: ORTHOPAEDIC SURGERY

## 2022-01-30 PROCEDURE — A4217 STERILE WATER/SALINE, 500 ML: HCPCS | Performed by: ORTHOPAEDIC SURGERY

## 2022-01-30 PROCEDURE — 36415 COLL VENOUS BLD VENIPUNCTURE: CPT

## 2022-01-30 PROCEDURE — C1713 ANCHOR/SCREW BN/BN,TIS/BN: HCPCS | Performed by: ORTHOPAEDIC SURGERY

## 2022-01-30 PROCEDURE — 73552 X-RAY EXAM OF FEMUR 2/>: CPT

## 2022-01-30 PROCEDURE — 6370000000 HC RX 637 (ALT 250 FOR IP): Performed by: HEALTH CARE PROVIDER

## 2022-01-30 PROCEDURE — 6360000002 HC RX W HCPCS: Performed by: ORTHOPAEDIC SURGERY

## 2022-01-30 PROCEDURE — 0SPD09Z REMOVAL OF LINER FROM LEFT KNEE JOINT, OPEN APPROACH: ICD-10-PCS | Performed by: ORTHOPAEDIC SURGERY

## 2022-01-30 PROCEDURE — 85014 HEMATOCRIT: CPT

## 2022-01-30 PROCEDURE — 1200000000 HC SEMI PRIVATE

## 2022-01-30 PROCEDURE — 2580000003 HC RX 258: Performed by: NURSE ANESTHETIST, CERTIFIED REGISTERED

## 2022-01-30 PROCEDURE — 85025 COMPLETE CBC W/AUTO DIFF WBC: CPT

## 2022-01-30 PROCEDURE — 0SUU09Z SUPPLEMENT LEFT KNEE JOINT, FEMORAL SURFACE WITH LINER, OPEN APPROACH: ICD-10-PCS | Performed by: ORTHOPAEDIC SURGERY

## 2022-01-30 PROCEDURE — 73502 X-RAY EXAM HIP UNI 2-3 VIEWS: CPT

## 2022-01-30 PROCEDURE — 7100000000 HC PACU RECOVERY - FIRST 15 MIN: Performed by: ORTHOPAEDIC SURGERY

## 2022-01-30 PROCEDURE — 6360000002 HC RX W HCPCS: Performed by: NURSE ANESTHETIST, CERTIFIED REGISTERED

## 2022-01-30 PROCEDURE — 2780000010 HC IMPLANT OTHER: Performed by: ORTHOPAEDIC SURGERY

## 2022-01-30 PROCEDURE — C1776 JOINT DEVICE (IMPLANTABLE): HCPCS | Performed by: ORTHOPAEDIC SURGERY

## 2022-01-30 PROCEDURE — 0QS706Z REPOSITION LEFT UPPER FEMUR WITH INTRAMEDULLARY INTERNAL FIXATION DEVICE, OPEN APPROACH: ICD-10-PCS | Performed by: ORTHOPAEDIC SURGERY

## 2022-01-30 PROCEDURE — 3600000004 HC SURGERY LEVEL 4 BASE: Performed by: ORTHOPAEDIC SURGERY

## 2022-01-30 PROCEDURE — 85018 HEMOGLOBIN: CPT

## 2022-01-30 PROCEDURE — 3209999900 FLUORO FOR SURGICAL PROCEDURES

## 2022-01-30 PROCEDURE — 0QPC04Z REMOVAL OF INTERNAL FIXATION DEVICE FROM LEFT LOWER FEMUR, OPEN APPROACH: ICD-10-PCS | Performed by: ORTHOPAEDIC SURGERY

## 2022-01-30 PROCEDURE — 6360000002 HC RX W HCPCS: Performed by: HEALTH CARE PROVIDER

## 2022-01-30 PROCEDURE — 3700000001 HC ADD 15 MINUTES (ANESTHESIA): Performed by: ORTHOPAEDIC SURGERY

## 2022-01-30 PROCEDURE — 27245 TREAT THIGH FRACTURE: CPT | Performed by: ORTHOPAEDIC SURGERY

## 2022-01-30 DEVICE — COMPONENT TIB KNEE LOK BAR COMPLT SYS VANGUARD: Type: IMPLANTABLE DEVICE | Site: KNEE | Status: FUNCTIONAL

## 2022-01-30 DEVICE — SCREW BNE L56MM DIA5MM TIB LT GRN TI ST CANN LOK FULL THRD: Type: IMPLANTABLE DEVICE | Site: FEMUR | Status: FUNCTIONAL

## 2022-01-30 DEVICE — IMPLANTABLE DEVICE: Type: IMPLANTABLE DEVICE | Site: FEMUR | Status: FUNCTIONAL

## 2022-01-30 DEVICE — NAIL IM L400MM DIA12MM 130DEG LNG L PROX FEM GRN TI CANN: Type: IMPLANTABLE DEVICE | Site: FEMUR | Status: FUNCTIONAL

## 2022-01-30 DEVICE — IMPLANTABLE DEVICE: Type: IMPLANTABLE DEVICE | Site: KNEE | Status: FUNCTIONAL

## 2022-01-30 RX ORDER — LIDOCAINE HYDROCHLORIDE 10 MG/ML
INJECTION, SOLUTION EPIDURAL; INFILTRATION; INTRACAUDAL; PERINEURAL PRN
Status: DISCONTINUED | OUTPATIENT
Start: 2022-01-30 | End: 2022-01-30 | Stop reason: SDUPTHER

## 2022-01-30 RX ORDER — BUDESONIDE AND FORMOTEROL FUMARATE DIHYDRATE 160; 4.5 UG/1; UG/1
2 AEROSOL RESPIRATORY (INHALATION) 2 TIMES DAILY
Status: DISCONTINUED | OUTPATIENT
Start: 2022-01-30 | End: 2022-02-12 | Stop reason: HOSPADM

## 2022-01-30 RX ORDER — MEPERIDINE HYDROCHLORIDE 50 MG/ML
12.5 INJECTION INTRAMUSCULAR; INTRAVENOUS; SUBCUTANEOUS EVERY 5 MIN PRN
Status: DISCONTINUED | OUTPATIENT
Start: 2022-01-30 | End: 2022-01-30 | Stop reason: HOSPADM

## 2022-01-30 RX ORDER — FENTANYL CITRATE 50 UG/ML
25 INJECTION, SOLUTION INTRAMUSCULAR; INTRAVENOUS EVERY 5 MIN PRN
Status: DISCONTINUED | OUTPATIENT
Start: 2022-01-30 | End: 2022-01-30 | Stop reason: HOSPADM

## 2022-01-30 RX ORDER — VANCOMYCIN HYDROCHLORIDE 1 G/20ML
INJECTION, POWDER, LYOPHILIZED, FOR SOLUTION INTRAVENOUS PRN
Status: DISCONTINUED | OUTPATIENT
Start: 2022-01-30 | End: 2022-01-30 | Stop reason: ALTCHOICE

## 2022-01-30 RX ORDER — CEFAZOLIN SODIUM 2 G/50ML
SOLUTION INTRAVENOUS PRN
Status: DISCONTINUED | OUTPATIENT
Start: 2022-01-30 | End: 2022-01-30 | Stop reason: SDUPTHER

## 2022-01-30 RX ORDER — SODIUM CHLORIDE, SODIUM LACTATE, POTASSIUM CHLORIDE, CALCIUM CHLORIDE 600; 310; 30; 20 MG/100ML; MG/100ML; MG/100ML; MG/100ML
INJECTION, SOLUTION INTRAVENOUS CONTINUOUS PRN
Status: DISCONTINUED | OUTPATIENT
Start: 2022-01-30 | End: 2022-01-30 | Stop reason: SDUPTHER

## 2022-01-30 RX ORDER — ACETAMINOPHEN 500 MG
1000 TABLET ORAL EVERY 8 HOURS SCHEDULED
Status: DISCONTINUED | OUTPATIENT
Start: 2022-01-30 | End: 2022-02-09

## 2022-01-30 RX ORDER — FENTANYL CITRATE 50 UG/ML
INJECTION, SOLUTION INTRAMUSCULAR; INTRAVENOUS PRN
Status: DISCONTINUED | OUTPATIENT
Start: 2022-01-30 | End: 2022-01-30 | Stop reason: SDUPTHER

## 2022-01-30 RX ORDER — DEXAMETHASONE SODIUM PHOSPHATE 10 MG/ML
INJECTION INTRAMUSCULAR; INTRAVENOUS PRN
Status: DISCONTINUED | OUTPATIENT
Start: 2022-01-30 | End: 2022-01-30 | Stop reason: SDUPTHER

## 2022-01-30 RX ORDER — GLYCOPYRROLATE 1 MG/5 ML
SYRINGE (ML) INTRAVENOUS PRN
Status: DISCONTINUED | OUTPATIENT
Start: 2022-01-30 | End: 2022-01-30 | Stop reason: SDUPTHER

## 2022-01-30 RX ORDER — PROPOFOL 10 MG/ML
INJECTION, EMULSION INTRAVENOUS PRN
Status: DISCONTINUED | OUTPATIENT
Start: 2022-01-30 | End: 2022-01-30 | Stop reason: SDUPTHER

## 2022-01-30 RX ORDER — NEOSTIGMINE METHYLSULFATE 5 MG/5 ML
SYRINGE (ML) INTRAVENOUS PRN
Status: DISCONTINUED | OUTPATIENT
Start: 2022-01-30 | End: 2022-01-30 | Stop reason: SDUPTHER

## 2022-01-30 RX ORDER — FENTANYL CITRATE 50 UG/ML
50 INJECTION, SOLUTION INTRAMUSCULAR; INTRAVENOUS EVERY 5 MIN PRN
Status: DISCONTINUED | OUTPATIENT
Start: 2022-01-30 | End: 2022-01-30 | Stop reason: HOSPADM

## 2022-01-30 RX ORDER — ROCURONIUM BROMIDE 10 MG/ML
INJECTION, SOLUTION INTRAVENOUS PRN
Status: DISCONTINUED | OUTPATIENT
Start: 2022-01-30 | End: 2022-01-30 | Stop reason: SDUPTHER

## 2022-01-30 RX ORDER — PHENYLEPHRINE HCL IN 0.9% NACL 0.5 MG/5ML
SYRINGE (ML) INTRAVENOUS PRN
Status: DISCONTINUED | OUTPATIENT
Start: 2022-01-30 | End: 2022-01-30 | Stop reason: SDUPTHER

## 2022-01-30 RX ORDER — ONDANSETRON 2 MG/ML
4 INJECTION INTRAMUSCULAR; INTRAVENOUS
Status: DISCONTINUED | OUTPATIENT
Start: 2022-01-30 | End: 2022-01-30 | Stop reason: HOSPADM

## 2022-01-30 RX ORDER — ONDANSETRON 2 MG/ML
INJECTION INTRAMUSCULAR; INTRAVENOUS PRN
Status: DISCONTINUED | OUTPATIENT
Start: 2022-01-30 | End: 2022-01-30 | Stop reason: SDUPTHER

## 2022-01-30 RX ORDER — FENTANYL CITRATE 50 UG/ML
25 INJECTION, SOLUTION INTRAMUSCULAR; INTRAVENOUS
Status: COMPLETED | OUTPATIENT
Start: 2022-01-30 | End: 2022-01-30

## 2022-01-30 RX ORDER — MAGNESIUM HYDROXIDE 1200 MG/15ML
LIQUID ORAL CONTINUOUS PRN
Status: COMPLETED | OUTPATIENT
Start: 2022-01-30 | End: 2022-01-30

## 2022-01-30 RX ADMIN — SODIUM CHLORIDE, POTASSIUM CHLORIDE, SODIUM LACTATE AND CALCIUM CHLORIDE: 600; 310; 30; 20 INJECTION, SOLUTION INTRAVENOUS at 08:09

## 2022-01-30 RX ADMIN — ROCURONIUM BROMIDE 20 MG: 10 INJECTION INTRAVENOUS at 08:42

## 2022-01-30 RX ADMIN — GABAPENTIN 300 MG: 300 CAPSULE ORAL at 21:02

## 2022-01-30 RX ADMIN — Medication 0.4 MG: at 10:44

## 2022-01-30 RX ADMIN — Medication 50 MCG: at 09:31

## 2022-01-30 RX ADMIN — Medication 3 MG: at 10:44

## 2022-01-30 RX ADMIN — FENTANYL CITRATE 50 MCG: 50 INJECTION, SOLUTION INTRAMUSCULAR; INTRAVENOUS at 10:19

## 2022-01-30 RX ADMIN — CEFAZOLIN 2000 MG: 10 INJECTION, POWDER, FOR SOLUTION INTRAVENOUS at 23:33

## 2022-01-30 RX ADMIN — SODIUM CHLORIDE, PRESERVATIVE FREE 10 ML: 5 INJECTION INTRAVENOUS at 21:00

## 2022-01-30 RX ADMIN — IBUPROFEN 400 MG: 400 TABLET, FILM COATED ORAL at 20:58

## 2022-01-30 RX ADMIN — ONDANSETRON 4 MG: 2 INJECTION INTRAMUSCULAR; INTRAVENOUS at 06:53

## 2022-01-30 RX ADMIN — ROCURONIUM BROMIDE 10 MG: 10 INJECTION INTRAVENOUS at 10:04

## 2022-01-30 RX ADMIN — FENTANYL CITRATE 50 MCG: 50 INJECTION, SOLUTION INTRAMUSCULAR; INTRAVENOUS at 08:35

## 2022-01-30 RX ADMIN — Medication 100 MCG: at 08:17

## 2022-01-30 RX ADMIN — ROCURONIUM BROMIDE 50 MG: 10 INJECTION INTRAVENOUS at 08:17

## 2022-01-30 RX ADMIN — LIDOCAINE HYDROCHLORIDE 50 MG: 10 INJECTION, SOLUTION EPIDURAL; INFILTRATION; INTRACAUDAL; PERINEURAL at 08:17

## 2022-01-30 RX ADMIN — FENTANYL CITRATE 50 MCG: 50 INJECTION, SOLUTION INTRAMUSCULAR; INTRAVENOUS at 08:40

## 2022-01-30 RX ADMIN — PROPOFOL 150 MG: 10 INJECTION, EMULSION INTRAVENOUS at 08:17

## 2022-01-30 RX ADMIN — ACETAMINOPHEN 1000 MG: 325 TABLET ORAL at 21:03

## 2022-01-30 RX ADMIN — GABAPENTIN 300 MG: 300 CAPSULE ORAL at 15:24

## 2022-01-30 RX ADMIN — CEFAZOLIN 2000 MG: 10 INJECTION, POWDER, FOR SOLUTION INTRAVENOUS at 17:06

## 2022-01-30 RX ADMIN — FENTANYL CITRATE 25 MCG: 50 INJECTION, SOLUTION INTRAMUSCULAR; INTRAVENOUS at 15:24

## 2022-01-30 RX ADMIN — FENTANYL CITRATE 50 MCG: 50 INJECTION, SOLUTION INTRAMUSCULAR; INTRAVENOUS at 10:04

## 2022-01-30 RX ADMIN — METHOCARBAMOL TABLETS 750 MG: 750 TABLET, COATED ORAL at 23:29

## 2022-01-30 RX ADMIN — DEXAMETHASONE SODIUM PHOSPHATE 4 MG: 10 INJECTION INTRAMUSCULAR; INTRAVENOUS at 08:32

## 2022-01-30 RX ADMIN — IBUPROFEN 400 MG: 400 TABLET, FILM COATED ORAL at 04:00

## 2022-01-30 RX ADMIN — PANCRELIPASE 24000 UNITS: 24000; 76000; 120000 CAPSULE, DELAYED RELEASE PELLETS ORAL at 17:06

## 2022-01-30 RX ADMIN — ROCURONIUM BROMIDE 20 MG: 10 INJECTION INTRAVENOUS at 09:30

## 2022-01-30 RX ADMIN — ONDANSETRON 4 MG: 2 INJECTION INTRAMUSCULAR; INTRAVENOUS at 10:39

## 2022-01-30 RX ADMIN — FENTANYL CITRATE 50 MCG: 50 INJECTION, SOLUTION INTRAMUSCULAR; INTRAVENOUS at 08:57

## 2022-01-30 RX ADMIN — CEFAZOLIN SODIUM 2000 MG: 2 SOLUTION INTRAVENOUS at 08:24

## 2022-01-30 RX ADMIN — ACETAMINOPHEN 1000 MG: 325 TABLET ORAL at 15:25

## 2022-01-30 RX ADMIN — METHOCARBAMOL TABLETS 750 MG: 750 TABLET, COATED ORAL at 17:05

## 2022-01-30 RX ADMIN — IBUPROFEN 400 MG: 400 TABLET, FILM COATED ORAL at 23:30

## 2022-01-30 RX ADMIN — ROPINIROLE HYDROCHLORIDE 0.25 MG: 0.25 TABLET, FILM COATED ORAL at 20:58

## 2022-01-30 RX ADMIN — METOPROLOL SUCCINATE 50 MG: 50 TABLET, EXTENDED RELEASE ORAL at 20:58

## 2022-01-30 RX ADMIN — Medication 50 MCG: at 09:11

## 2022-01-30 RX ADMIN — FENTANYL CITRATE 25 MCG: 50 INJECTION, SOLUTION INTRAMUSCULAR; INTRAVENOUS at 13:12

## 2022-01-30 RX ADMIN — BUDESONIDE AND FORMOTEROL FUMARATE DIHYDRATE 2 PUFF: 160; 4.5 AEROSOL RESPIRATORY (INHALATION) at 21:00

## 2022-01-30 RX ADMIN — IBUPROFEN 400 MG: 400 TABLET, FILM COATED ORAL at 15:25

## 2022-01-30 ASSESSMENT — PULMONARY FUNCTION TESTS
PIF_VALUE: 18
PIF_VALUE: 17
PIF_VALUE: 18
PIF_VALUE: 16
PIF_VALUE: 18
PIF_VALUE: 17
PIF_VALUE: 17
PIF_VALUE: 16
PIF_VALUE: 17
PIF_VALUE: 18
PIF_VALUE: 16
PIF_VALUE: 18
PIF_VALUE: 17
PIF_VALUE: 18
PIF_VALUE: 10
PIF_VALUE: 17
PIF_VALUE: 19
PIF_VALUE: 19
PIF_VALUE: 17
PIF_VALUE: 2
PIF_VALUE: 19
PIF_VALUE: 18
PIF_VALUE: 17
PIF_VALUE: 18
PIF_VALUE: 17
PIF_VALUE: 18
PIF_VALUE: 12
PIF_VALUE: 15
PIF_VALUE: 19
PIF_VALUE: 0
PIF_VALUE: 1
PIF_VALUE: 17
PIF_VALUE: 18
PIF_VALUE: 7
PIF_VALUE: 17
PIF_VALUE: 18
PIF_VALUE: 24
PIF_VALUE: 1
PIF_VALUE: 17
PIF_VALUE: 16
PIF_VALUE: 18
PIF_VALUE: 7
PIF_VALUE: 17
PIF_VALUE: 17
PIF_VALUE: 18
PIF_VALUE: 1
PIF_VALUE: 17
PIF_VALUE: 18
PIF_VALUE: 16
PIF_VALUE: 18
PIF_VALUE: 16
PIF_VALUE: 17
PIF_VALUE: 18
PIF_VALUE: 17
PIF_VALUE: 19
PIF_VALUE: 18
PIF_VALUE: 15
PIF_VALUE: 18
PIF_VALUE: 17
PIF_VALUE: 14
PIF_VALUE: 1
PIF_VALUE: 19
PIF_VALUE: 15
PIF_VALUE: 15
PIF_VALUE: 17
PIF_VALUE: 18
PIF_VALUE: 0
PIF_VALUE: 18
PIF_VALUE: 17
PIF_VALUE: 19
PIF_VALUE: 0
PIF_VALUE: 18
PIF_VALUE: 17
PIF_VALUE: 18
PIF_VALUE: 19
PIF_VALUE: 18
PIF_VALUE: 16
PIF_VALUE: 17
PIF_VALUE: 0
PIF_VALUE: 19
PIF_VALUE: 19
PIF_VALUE: 18
PIF_VALUE: 15
PIF_VALUE: 18
PIF_VALUE: 18
PIF_VALUE: 17
PIF_VALUE: 19
PIF_VALUE: 16
PIF_VALUE: 18
PIF_VALUE: 16
PIF_VALUE: 5
PIF_VALUE: 18
PIF_VALUE: 17
PIF_VALUE: 19
PIF_VALUE: 18
PIF_VALUE: 17
PIF_VALUE: 19
PIF_VALUE: 17
PIF_VALUE: 17
PIF_VALUE: 18
PIF_VALUE: 19
PIF_VALUE: 16
PIF_VALUE: 18
PIF_VALUE: 17
PIF_VALUE: 3
PIF_VALUE: 16
PIF_VALUE: 16
PIF_VALUE: 19
PIF_VALUE: 14
PIF_VALUE: 18
PIF_VALUE: 18
PIF_VALUE: 17
PIF_VALUE: 17
PIF_VALUE: 1
PIF_VALUE: 16
PIF_VALUE: 17
PIF_VALUE: 0
PIF_VALUE: 16
PIF_VALUE: 19
PIF_VALUE: 18
PIF_VALUE: 17
PIF_VALUE: 18
PIF_VALUE: 18
PIF_VALUE: 17
PIF_VALUE: 18
PIF_VALUE: 1
PIF_VALUE: 19
PIF_VALUE: 18
PIF_VALUE: 17
PIF_VALUE: 18
PIF_VALUE: 18
PIF_VALUE: 17
PIF_VALUE: 17
PIF_VALUE: 18
PIF_VALUE: 18
PIF_VALUE: 17
PIF_VALUE: 17
PIF_VALUE: 18
PIF_VALUE: 17
PIF_VALUE: 19
PIF_VALUE: 10
PIF_VALUE: 18
PIF_VALUE: 3
PIF_VALUE: 18
PIF_VALUE: 18
PIF_VALUE: 17
PIF_VALUE: 17
PIF_VALUE: 24
PIF_VALUE: 18
PIF_VALUE: 18
PIF_VALUE: 0

## 2022-01-30 ASSESSMENT — PAIN SCALES - GENERAL
PAINLEVEL_OUTOF10: 4
PAINLEVEL_OUTOF10: 9
PAINLEVEL_OUTOF10: 4
PAINLEVEL_OUTOF10: 7
PAINLEVEL_OUTOF10: 8
PAINLEVEL_OUTOF10: 3
PAINLEVEL_OUTOF10: 7
PAINLEVEL_OUTOF10: 10
PAINLEVEL_OUTOF10: 10
PAINLEVEL_OUTOF10: 4

## 2022-01-30 ASSESSMENT — PAIN DESCRIPTION - LOCATION
LOCATION: ABDOMEN

## 2022-01-30 ASSESSMENT — PAIN DESCRIPTION - PAIN TYPE
TYPE: ACUTE PAIN

## 2022-01-30 NOTE — PLAN OF CARE
Problem: Airway Clearance - Ineffective  Goal: Achieve or maintain patent airway  1/30/2022 1412 by Jorge Meza RN  Outcome: Ongoing  1/30/2022 0839 by Deb Ozuna RN  Outcome: Met This Shift

## 2022-01-30 NOTE — PROGRESS NOTES
Trauma Tertiary Survey    Admit Date: 1/28/2022  Hospital day 2    Fall 31 Maricruz Morales       Past Medical History:   Diagnosis Date    Acute superficial gastritis without hemorrhage 05/26/2018    Anastomotic stricture of stomach     Asthma     Atrial fibrillation ([facility] [address]     On Xarelto 6/27/2020    Calculus of gallbladder without cholecystitis without obstruction 05/02/2017    Chronic diastolic heart failure ([facility] [address] 11/17/2017    Chronic rhinosinusitis 04/13/2015    Class 2 severe obesity due to excess calories with serious comorbidity and body mass index (BMI) of 36.0 to 36.9 in adult [facility]) 11/17/2017    COPD (chronic obstructive pulmonary disease) ([facility] [address]     E. coli septicemia (HCC)     E. coli UTI     Foot drop, right 07/10/2012    Fracture of femur ([facility] [address] 10/23/2016    Gait disorder 07/20/2016    Gastric out let obstruction     GERD (gastroesophageal reflux disease)     Hallux valgus, acquired, bilateral 06/24/2015    Hyperlipidemia     Hypertension     Impaired hearing 04/13/2015    Internal hemorrhoids 01/03/2017    Lymphedema of both lower extremities 06/24/2015    Nausea & vomiting 11/16/2018    OA (osteoarthritis) of knee, bilateral 12/11/2006    Obesity     MARIAMA on CPAP 05/02/2017    Osteoarthritis     Osteoarthritis of lumbar spine 12/13/2007     replace inactive diagnosis    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 coli (HCC)     Due to UTI     Sick sinus syndrome (HCC)     Simple chronic bronchitis ([facility] [address] 04/10/2018    Slow transit constipation 11/03/2016    Unspecified sleep apnea     UTI (urinary tract infection)        Scheduled Meds:   sodium chloride flush  5-40 mL IntraVENous 2 times per day    polyethylene glycol  17 g Oral Daily    gabapentin  300 mg Oral Q8H    ibuprofen  400 mg Oral Q4H    methocarbamol  750 mg Oral Q6H    [START ON 1/30/2022] bumetanide  2 mg Oral Daily    metoprolol succinate  50 mg Oral Nightly    lipase-protease-amylase  24,000 Units Oral TID WC    rivaroxaban  20 mg Oral Daily    rOPINIRole  0.25 mg Oral BID    [START ON 1/30/2022] pantoprazole  40 mg Oral QAM AC    tamsulosin  0.4 mg Oral Daily    budesonide-formoterol  2 puff Inhalation BID    ferrous sulfate  325 mg Oral Every Other Day    sotrovimab IVPB  500 mg IntraVENous Once     Continuous Infusions:   sodium chloride       PRN Meds:sodium chloride flush, sodium chloride, ondansetron **OR** ondansetron, albuterol sulfate HFA, artificial tears, carboxymethylcellulose PF, ipratropium-albuterol, modafinil    Subjective:     Patient has left hip and left ankle pain. Pain is mild, worsens with movement, and some relief by rest.  There is not associated numbness, tingling, weakness. Objective:     Patient Vitals for the past 8 hrs:   BP Temp Temp src Pulse Resp SpO2 Height   01/29/22 1745 (!) 97/55 98.8 °F (37.1 °C) Oral 70 16 98 % --   01/29/22 1455 -- -- -- -- -- -- 5' 10\" (1.778 m)   01/29/22 1300 (!) 96/59 98.4 °F (36.9 °C) Oral 71 14 99 % --       I/O last 3 completed shifts:  In: -   Out: 300 [Urine:300]  No intake/output data recorded.     Radiology:  ECHO Complete 2D W Doppler W Color    Result Date: 1/29/2022  Transthoracic Echocardiography Report (TTE)  Patient Name [facility]      Date of Study               01/29/2022               Drew VAUGHN   Date of      1945  Gender                      Male  Birth   Age          77 year(s)  Race                           Room Number  2006        Height:                     70 inch, 177.8 cm   Corporate ID [id number]    Weight:                     170 pounds, 77.1 kg  #   Patient Acct [de-identified]   BSA:          1.95 m^2      BMI:      24.39  #                                                              kg/m^2   MR #         [id number]     Sonographer                 [facility]   Accession #  [id number]  Interpreting Physician      Kathie Seay   Fellow                   Referring Nurse Practitioner   Interpreting             Referring Physician         DO Melody Barrera  Type of Study   TTE procedure:2D Echocardiogram, M-Mode, Doppler, Color Doppler. Procedure Date Date: 01/29/2022 Start: 07:21 AM Study Location: [facility] Technical Quality: Adequate visualization Indications:Atrial fibrillation, Congestive heart failure and Preop cardiac evaluation. History / Tech. Comments: Echo done at patient bedside. Procedure explained to patient. HTN, MARIAMA, COPD Patient Status: Inpatient Height: 70 inches Weight: 170 pounds BSA: 1.95 m^2 BMI: 24.39 kg/m^2 Allergies   - *Unlisted:(Darvocet, propoxyphene). - Percocet. CONCLUSIONS Summary Left ventricle is normal in size. Global left ventricular systolic function is difficult to assess due to heart rate and rhythm but appears normal. Visually estimated EF 50%. Abnormal septal motion; may be due to pacemaker. Mild concentric left ventricular hypertrophy. No significant valvular regurgitation or stenosis seen. Signature ----------------------------------------------------------------------------  Electronically signed by Jennifer Tomas(Sonographer) on 01/29/2022  08:04 AM ---------------------------------------------------------------------------- ----------------------------------------------------------------------------  Electronically signed by Kathie Seay(Interpreting physician) on  01/29/2022 12:10 PM ---------------------------------------------------------------------------- FINDINGS Left Atrium Left atrium is moderately dilated. Left Ventricle Left ventricle is normal in size. Global left ventricular systolic function is difficult to assess due to heart rate and rhythm but appears normal. Visually estimated EF 50%. Abnormal septal motion; may be due to pacemaker. Mild concentric left ventricular hypertrophy. Right Atrium Right atrial dilatation. Right Ventricle Normal right ventricular size and function.  Pacemaker / Klever Weber lead seen in right ventricle. TAPSE value of 1.07cm noted. Mitral Valve Normal mitral valve structure. Trivial mitral regurgitation. Aortic Valve Aortic valve appears trileaflet. Aortic valve is mildly sclerotic but opens well. No aortic insufficiency. Tricuspid Valve Normal tricuspid valve structure and function. No tricuspid regurgitation. Pulmonic Valve The pulmonic valve is normal in structure. No pulmonic insufficiency. Pericardial Effusion Small posterior pericardial effusion. Small anterior fat pad vs. pericardial effusion. Miscellaneous IVC not well visualized. M-mode / 2D Measurements & Calculations:   LVIDd:4 cm(3.7 - 5.6 cm)         Diastolic MSXZLD:00.8 ml  AUYKP:8.1 cm(2.2 - 4.0 cm)       Systolic ITEVFL:59.3 ml  VIQX:0.6 cm(0.6 - 1.1 cm)        LA Dimension: 4.1 cm(1.9 - 4.0 cm)  LVPWd:1.3 cm(0.6 - 1.1 cm)       LA volume/Index: 91.27 ml /47m^2  Fractional Shortenin.5 %     LVOT:1.9 cm  Calculated LVEF (%): 25.06 %     RVDd:3.9 cm   Mitral:                                 Aortic   Valve Area (P1/2-Time): 4.89 cm^2       Peak Velocity: 1.26 m/s  Peak E-Wave: 0.80 m/s                   Mean Velocity: 0.82 m/s                                          Peak Gradient: 6.35 mmHg  Peak Gradient: 2.54 mmHg                Mean Gradient: 3 mmHg  Mean Gradient: 1 mmHg  Deceleration Time: 153 msec  P1/2t: 45 msec                          Area (continuity): 2.11 cm^2                                          AV VTI: 25.1 cm   Area (continuity): 2.27 cm^2  Mean Velocity: 0.51 m/s  Diastology / Tissue Doppler Lateral Wall E' velocity:0.06 m/s Lateral Wall E/E':14.4    XR FEMUR LEFT (MIN 2 VIEWS)    Result Date: 2022  EXAMINATION: 5 XRAY VIEWS OF THE LEFT FEMUR 2022 10:52 pm COMPARISON: None.  HISTORY: ORDERING SYSTEM PROVIDED HISTORY: fall TECHNOLOGIST PROVIDED HISTORY: fall Reason for Exam: Fall, bilateral hip pain, Majority of pain in left hip and down left leg FINDINGS: There is an intertrochanteric fracture of the proximal femur. There is an intramedullary lamberto fixing a remote healed femoral fracture. There is partial visualization of a left knee arthroplasty without complication. The visualized bony pelvis appears intact. The surrounding soft tissues are unremarkable. Intertrochanteric fracture of the proximal left femur. XR KNEE LEFT (3 VIEWS)    Result Date: 1/28/2022  EXAMINATION: THREE XRAY VIEWS OF THE LEFT KNEE 1/28/2022 10:52 pm COMPARISON: None. HISTORY: ORDERING SYSTEM PROVIDED HISTORY: fall TECHNOLOGIST PROVIDED HISTORY: fall Reason for Exam: Fall, bilateral hip pain, Majority of pain in left hip and down left leg FINDINGS: There is a left knee arthroplasty without evidence for complication. There is a femoral intramedullary lamberto fixing a remote healed femoral fracture. There is no acute osseous abnormality. The surrounding soft tissues are unremarkable. No acute osseous or soft tissue abnormality. CT HEAD WO CONTRAST    Result Date: 1/29/2022  EXAMINATION: CT OF THE HEAD WITHOUT CONTRAST  1/29/2022 12:33 am TECHNIQUE: CT of the head was performed without the administration of intravenous contrast. Dose modulation, iterative reconstruction, and/or weight based adjustment of the mA/kV was utilized to reduce the radiation dose to as low as reasonably achievable. COMPARISON: CT brain performed 04/07/2021. HISTORY: ORDERING SYSTEM PROVIDED HISTORY: fall on xeralto TECHNOLOGIST PROVIDED HISTORY: fall on xeralto Decision Support Exception - unselect if not a suspected or confirmed emergency medical condition->Emergency Medical Condition (MA) Reason for Exam: S/P Fall - On xeralto. FINDINGS: BRAIN/VENTRICLES: There is no acute intracranial hemorrhage, mass effect, or midline shift. There is satisfactory overall gray-white matter differentiation. The ventricular structures are symmetric and unremarkable. The infratentorial structures are unremarkable.  ORBITS: The visualized portion of the orbits demonstrate no acute abnormality. SINUSES: The visualized paranasal sinuses and mastoid air cells demonstrate no acute abnormality. SOFT TISSUES/SKULL:  No acute abnormality of the visualized skull or soft tissues. No acute intracranial abnormality. CT CERVICAL SPINE WO CONTRAST    Result Date: 1/29/2022  EXAMINATION: CT OF THE CERVICAL SPINE WITHOUT CONTRAST 1/29/2022 1:36 am TECHNIQUE: CT of the cervical spine was performed without the administration of intravenous contrast. Multiplanar reformatted images are provided for review. Dose modulation, iterative reconstruction, and/or weight based adjustment of the mA/kV was utilized to reduce the radiation dose to as low as reasonably achievable. COMPARISON: None. HISTORY: ORDERING SYSTEM PROVIDED HISTORY: Fall - trauma TECHNOLOGIST PROVIDED HISTORY: Fall - trauma Decision Support Exception - unselect if not a suspected or confirmed emergency medical condition->Emergency Medical Condition (MA) Reason for Exam: S/P Fall - Trauma. ** C-collar applied ** FINDINGS: BONES/ALIGNMENT: The vertebral body heights are maintained. The facet joints are aligned. There is no acute fracture or malalignment. There is no spondylolisthesis. DEGENERATIVE CHANGES: There is multilevel degenerative disc disease with disc space narrowing most pronounced at the C7-T1 level. There is no canal stenosis or significant bony foraminal narrowing. SOFT TISSUES: The posterior paraspinal soft tissues are unremarkable. Multiple nodules are seen throughout the right thyroid lobe. The lung apices are without acute process. No acute fracture or malalignment of the cervical spine. Multiple nodules within the right thyroid lobe and correlation with nonemergent sonography is recommended. XR SHOULDER LEFT (MIN 2 VIEWS)    Result Date: 1/29/2022  EXAMINATION: 3 XRAY VIEWS OF THE LEFT SHOULDER 1/29/2022 12:48 am COMPARISON: None.  HISTORY: ORDERING SYSTEM PROVIDED HISTORY: Trauma/Fracture TECHNOLOGIST PROVIDED HISTORY: Ap/scap y/axillary. Thank you Trauma/Fracture Reason for Exam: Fall FINDINGS: There is no acute osseous abnormality. There is hardware within the left humeral head from prior rotator cuff repair. There is degenerative change of the left shoulder joint spaces. The surrounding soft tissues are unremarkable. The visualized left lung is without acute process. No acute osseous or soft tissue abnormality. Degenerative change of the left shoulder joint spaces with evidence of prior rotator cuff repair. XR CHEST PORTABLE    Result Date: 1/29/2022  EXAMINATION: ONE XRAY VIEW OF THE CHEST 1/29/2022 3:14 am COMPARISON: Chest radiograph performed 01/07/2022. HISTORY: ORDERING SYSTEM PROVIDED HISTORY: pre op TECHNOLOGIST PROVIDED HISTORY: pre op Reason for Exam: pre-op   upright port FINDINGS: There is no acute consolidation or effusion. There is no pneumothorax. The mediastinal structures are stable with stable cardiac leads. The upper abdomen is unremarkable. The extrathoracic soft tissues are unremarkable. No acute cardiopulmonary process. CT CHEST ABDOMEN PELVIS W CONTRAST    Result Date: 1/29/2022  EXAMINATION: CT OF THE CHEST, ABDOMEN, AND PELVIS WITH CONTRAST 1/29/2022 1:36 am TECHNIQUE: CT of the chest, abdomen and pelvis was performed with the administration of intravenous contrast. Multiplanar reformatted images are provided for review. Dose modulation, iterative reconstruction, and/or weight based adjustment of the mA/kV was utilized to reduce the radiation dose to as low as reasonably achievable.  COMPARISON: None HISTORY: ORDERING SYSTEM PROVIDED HISTORY: fall - trauma TECHNOLOGIST PROVIDED HISTORY: fall - trauma Decision Support Exception - unselect if not a suspected or confirmed emergency medical condition->Emergency Medical Condition (MA) Reason for Exam: S/P Fall - TRAUMA  **HIP PAIN** FINDINGS: Chest: Mediastinum: Soft tissues of the thoracic inlet are unremarkable. The thoracic aorta is normal in caliber. The main pulmonary artery is normal in caliber. There is a trace pericardial effusion. There is no mediastinal or hilar adenopathy. Lungs/pleura: There is atelectasis versus scarring within the lungs bilaterally. There is no effusion. There is no pneumothorax. The tracheobronchial tree is patent. Soft Tissues/Bones: The extrathoracic soft tissues are unremarkable. There is no axillary adenopathy. There is no acute osseous abnormality. Abdomen/Pelvis: Organs: The liver and spleen are normal size and overall attenuation. There are stones within the gallbladder. The pancreas is atrophic. The adrenal glands are unremarkable. There are bilateral renal cysts. The ureters are not dilated. The urinary bladder is unremarkable. GI/Bowel: The stomach is grossly unremarkable. Loops of small bowel are normal in caliber without evidence for obstruction. The colon contains air and fecal residue. There are scattered uncomplicated diverticula involving the descending colon. There is no free air or free fluid. Pelvis: Phleboliths are seen in the pelvis. The prostate gland and seminal vesicles are otherwise unremarkable. Peritoneum/Retroperitoneum: The psoas muscles are symmetric. The abdominal aorta is normal in caliber. The inferior vena cava is unremarkable. There is no retroperitoneal or mesenteric adenopathy. Bones/Soft Tissues: The extra-abdominal soft tissues are unremarkable. There is an intertrochanteric fracture of the proximal left femur. Intertrochanteric fracture of the proximal left femur. Small pericardial effusion. Uncomplicated descending colon diverticulosis. No acute thoracic, abdominal, or pelvic abnormality.      CT LUMBAR SPINE TRAUMA RECONSTRUCTION    Result Date: 1/29/2022  EXAMINATION: CT OF THE LUMBAR SPINE WITHOUT CONTRAST  1/29/2022 TECHNIQUE: CT of the lumbar spine was performed without the administration of intravenous contrast. Multiplanar reformatted images are provided for review. Adjustment of mA and/or kV according to patient size was utilized. Dose modulation, iterative reconstruction, and/or weight based adjustment of the mA/kV was utilized to reduce the radiation dose to as low as reasonably achievable. COMPARISON: None HISTORY: ORDERING SYSTEM PROVIDED HISTORY: fall - trauma TECHNOLOGIST PROVIDED HISTORY: fall - trauma Reason for Exam: S/P Fall - TRAUMA. **RECONS** FINDINGS: BONES/ALIGNMENT: There is levocurvature of the lumbar spine. The vertebral body heights are maintained. There is no spondylolisthesis. There is no acute fracture or malalignment of the lumbar spine. DEGENERATIVE CHANGES: There is multilevel degenerative disc disease with disc space narrowing throughout the mid and lower lumbar spine. There is multilevel degenerative facet hypertrophy. There is bilateral bony foraminal narrowing within the mid and lower lumbar spine. SOFT TISSUES/RETROPERITONEUM: No paraspinal mass is seen. No acute fracture or malalignment of the lumbar spine. Multilevel degenerative change with bilateral bony foraminal narrowing in the mid and lower lumbar spine. CT THORACIC SPINE TRAUMA RECONSTRUCTION    Result Date: 1/29/2022  EXAMINATION: CT OF THE THORACIC SPINE WITHOUT CONTRAST  1/29/2022 1:36 am: TECHNIQUE: CT of the thoracic spine was performed without the administration of intravenous contrast. Multiplanar reformatted images are provided for review. Dose modulation, iterative reconstruction, and/or weight based adjustment of the mA/kV was utilized to reduce the radiation dose to as low as reasonably achievable. COMPARISON: None. HISTORY: ORDERING SYSTEM PROVIDED HISTORY: Fall - trauma TECHNOLOGIST PROVIDED HISTORY: Fall - trauma Reason for Exam: S/P Fall - TRAUMA. **RECONS** FINDINGS: BONES/ALIGNMENT: There is a normal thoracic kyphosis. The vertebral body heights are maintained.   The facet joints are aligned. There is no acute fracture or malalignment of the thoracic spine. DEGENERATIVE CHANGES: There is multilevel degenerative disc disease and multilevel degenerative facet hypertrophy. There is no significant canal stenosis or bony foraminal narrowing. SOFT TISSUES: No paraspinal mass is seen. No acute fracture or malalignment of the thoracic spine. XR HIP W PELVIS MIN 5 VWS BILATERAL    Result Date: 1/28/2022  EXAMINATION: ONE XRAY VIEW OF THE PELVIS AND TWO XRAY VIEWS OF EACH OF THE BILATERAL HIPS 1/28/2022 10:52 pm COMPARISON: None. HISTORY: ORDERING SYSTEM PROVIDED HISTORY: fall TECHNOLOGIST PROVIDED HISTORY: fall Reason for Exam: Fall, bilateral hip pain, Majority of pain in left hip and down left leg FINDINGS: Bony pelvis is intact. There is an intertrochanteric fracture of the proximal left femur. There is degenerative change of the lower lumbar spine. There is degenerative change of the SI joints and hip joints. Phleboliths are seen in the pelvis. The surrounding soft tissues are unremarkable. Intertrochanteric fracture of the proximal left femur. PHYSICAL EXAM:   GCS:  4 - Opens eyes on own   6 - Follows simple motor commands  5 - Alert and oriented    Pupil size:  Left 3 mm Right 3 mm  Pupil reaction: Yes  Wiggles fingers: Left Yes Right Yes  Hand grasp:   Left normal   Right normal  Wiggles toes: Left Yes    Right Yes  Plantar flexion: Left normal  Right normal    BP (!) 97/55   Pulse 70   Temp 98.8 °F (37.1 °C) (Oral)   Resp 16   Ht 5' 10\" (1.778 m)   Wt 170 lb 14.4 oz (77.5 kg)   SpO2 98%   BMI 24.52 kg/m²   General appearance: alert, appears stated age and cooperative  Head: Normocephalic, without obvious abnormality, atraumatic  Eyes: conjunctivae/corneas clear. PERRL, EOM's intact. Fundi benign. Ears: normal TM's and external ear canals both ears  Nose: Nares normal. Septum midline. Mucosa normal. No drainage or sinus tenderness.   Throat: lips, mucosa, and tongue normal; teeth and gums normal  Neck: no JVD and supple, symmetrical, trachea midline  Back: negative  Lungs: clear to auscultation bilaterally  Chest wall: no tenderness  Heart: regular rate and rhythm, S1, S2 normal, no murmur, click, rub or gallop  Abdomen: soft, non-tender; bowel sounds normal; no masses,  no organomegaly  Extremities: extremities normal, atraumatic, no cyanosis or edema. Tenderness to palpation over left ankle.   Pulses: 2+ and symmetric  Skin: Skin color, texture, turgor normal. No rashes or lesions  Neurologic: Grossly normal      Spine:     Spine Tenderness ROM   Cervical 0 /10 Normal   Thoracic 0 /10 Normal   Lumbar 0 /10 Normal     Musculoskeletal    Joint Tenderness Swelling ROM   Right shoulder absent absent normal   Left shoulder absent absent normal   Right elbow absent absent normal   Left elbow absent absent normal   Right wrist absent absent normal   Left wrist absent absent normal   Right hand grasp absent absent normal   Left hand grasp absent absent normal   Right hip absent absent normal   Left hip present absent abnormal - decreased due to Left intertrochanteric femur fracture   Right knee absent absent normal   Left knee present absent normal   Right ankle absent absent normal   Left ankle present absent normal   Right foot absent absent normal   Left foot absent absent normal     CONSULTS: Orthopedic surgery    PROCEDURES: none    INJURIES:  Left intertrochanteric femur fracture    Patient Active Problem List   Diagnosis    Chronic atrial fibrillation (HCC)    Dyslipidemia    Gastroesophageal reflux disease without esophagitis    Osteoarthritis of lumbar spine    OA (osteoarthritis) of knee, bilateral    Foot drop, right    Hallux valgus, acquired, bilateral    Hearing impairment    Essential hypertension    Slow transit constipation    MARIAMA on CPAP    Simple chronic bronchitis (HCC)    Non-intractable vomiting    Hospital-acquired bacterial pneumonia  Pneumonia due to organism    COPD (chronic obstructive pulmonary disease) (HCC)    Chronic diastolic heart failure (HCC)    History of bariatric surgery including banding leading to esophageal stricture and aspiration    BPH (benign prostatic hyperplasia)    Myalgia    Atrial fibrillation with slow ventricular response ([address]    Pacemaker pocket hematoma, initial encounter    Pneumonia-community-acquired    Acute hypoxemic respiratory failure (HCC)    SIRS (systemic inflammatory response syndrome) (HCC)    Pulmonary hypertension (HCC)    Presence of permanent cardiac pacemaker    Aspiration pneumonia ([address]    SBO (small bowel obstruction) ([address]    Fall at home, initial encounter         Assessment/Plan:     NEUROLOGIC:  1. MMPT: Neurontin, Robaxin, Ibuprofen  2. Home meds: Ropinirole, modafinil    CARDIOVASCULAR:  1. HDS  2. Home meds: bumex, toprol    PULMONARY:  1. Satting 98 RA  2. COVID+  3. Pulmonary Toilet  4. Encourage IS  5. Home meds: albuterol, symbicort, duoneb    RENAL/FLUID/ELECTROLYTE:  1. BUN 17  2. Creat 0.81  3. Electrolytes wnl  4. Voiding spontaneously    GI/NUTRITION:  1. NPO at midnight  2. protonix  3. Bowel regimen: glycolax  4. Home meds: creon    ID:  1. COVID+  2. WBC 7.0  3. Ancef preop  4. CRP 15.4    HEMATOLOGIC:  1. Hgb 11.1  2. Home meds: ferrous sulfate  3. Holding chemical DVT prophylaxis preop    ENDOCRINE:  1. Glucose 101  2. monitor blood glucose    MSK:  1. L intertrochanteric femur fracture  2.  Orthopedic surgery to take to OR tomorrow AM    DISPOSITION:   OR with ortho tomorrow AM.

## 2022-01-30 NOTE — PLAN OF CARE
Orthopedic Surgery Update Progress Note:    76 y/o male s/p removal deep orthopedic hardware left femur, cephalomedullary nail fixation left inter-trochanteric femur fracture POD #0     - Ok for general diet from Orthopedic standpoint if appropriate per primary and other consulting teams  - WBAT LLE  - Post-op abx: ancef 2g Q8H x 2 doses for standard post-op prophylaxis  - Pain control per primary  - Recommend orthopedic trauma post-op pain protocol if patient can tolerate as deemed appropriate per primary team  - Acetaminophen 1000mg q6h              - naproxen 500mg q12h              - Gabapentin 300mg TID              - Cyclobenzaprine 10mg q6h              - Oxycodone 5-10mg q4-6h prn pain   - Dressing: maintain dressing to the left lower extremity, reinforce as needed.  Do not remove the dressing, Ortho to perform formal dressing change POD #2  - DVT: ok to restart chemical AC starting POD #1 as deemed appropriate per primary and consulting teams.  - Please page Ortho with questions    All Soler DO  Orthopedic Surgery Resident, PGY-5  [address], [address] [place] [address]

## 2022-01-30 NOTE — ANESTHESIA PRE PROCEDURE
Department of Anesthesiology  Preprocedure Note       Name:  Becky Cordoba   Age:  77 y.o.  :  1945                                          MRN:  [id number]         Date:  2022      Surgeon: Marcelo Castañeda):  Nam Mason DO    Procedure: CEPHALOMEDULLARY NAIL INSERTION WITH REMOVAL OF FEMUR IM NAIL, POSSIBLE TOTAL KNEE REVISION, SYNTHES, Womack Osier. Medications prior to admission:   Prior to Admission medications    Medication Sig Start Date End Date Taking?  Authorizing Provider   benzonatate (TESSALON) 100 MG capsule TAKE 1 CAPSULE BY MOUTH THREE TIMES DAILY AS NEEDED FOR COUGH 22   JULIAN Hannah CNP   vitamin B-12 (CYANOCOBALAMIN) 100 MCG tablet TAKE 1 TABLET BY MOUTH DAILY AS NEEDED FOR FATIGUE 22   JULIAN Hannah CNP   docusate sodium (COLACE) 100 MG capsule TAKE 1 CAPSULE BY MOUTH TWICE DAILY AS NEEDED FOR CONSTIPATION 1/10/22   JULIAN Hannah CNP   bumetanide (BUMEX) 1 MG tablet TAKE 2 TABLETS BY MOUTH EVERY MORNING AND TAKE 1 TABLET BY MOUTH IN THE AFTERNOON AND 1 TABLET EVERY EVENING IF WEIGHT IS >3 ABOVE BASELINE 1/10/22   JULIAN Hannah CNP   cetirizine (ZYRTEC) 10 MG tablet TAKE 1 TABLET BY MOUTH DAILY 1/10/22   JULIAN Hannah CNP   metoprolol succinate (TOPROL XL) 50 MG extended release tablet TAKE 1 TABLET BY MOUTH AT BEDTIME 1/10/22   JULIAN Hannah CNP   CREON 11954-61188 units delayed release capsule TAKE 1 CAPSULE BY MOUTH THREE TIMES DAILY WITH MEALS 1/10/22   JULIAN Hannah CNP   rivaroxaban (XARELTO) 20 MG TABS tablet TAKE 1 TABLET BY MOUTH DAILY 1/10/22   JULIAN Hannah CNP   diclofenac sodium (VOLTAREN) 1 % GEL Apply 2 g topically 4 times daily as needed for Pain 1/10/22   Josefina Freeman MD   fluticasone [facility]) 50 MCG/ACT nasal spray 2 sprays by Nasal route daily 21   JULIAN Hannah CNP   Multiple Vitamin (MULTI-VITAMINS) TABS TAKE 1 TABLET BY MOUTH DAILY 12/10/21   Atif Shook JULIAN Zendejas CNP   potassium chloride (KLOR-CON M) 20 MEQ extended release tablet TAKE 1 TABLET BY MOUTH TWICE DAILY 12/10/21   JULIAN Garcia CNP   tiZANidine (ZANAFLEX) 4 MG tablet TAKE 1 TABLET BY MOUTH DAILY AS NEEDED 12/10/21   JULIAN Garcia CNP   rOPINIRole (REQUIP) 0.25 MG tablet TAKE 1 TABLET BY MOUTH TWICE DAILY 11/15/21   JULIAN Garcia CNP   pantoprazole (PROTONIX) 40 MG tablet TAKE 1 TABLET BY MOUTH DAILY 11/15/21   JULIAN Garcia CNP   tamsulosin [facility]) 0.4 MG capsule Take 1 capsule by mouth daily 10/19/21   JULIAN Garcia CNP   Fluticasone furoate-vilanterol (BREO ELLIPTA) 200-25 MCG/INH AEPB inhaler INHALE 1 PUFF INTO THE LUNGS DAILY **RINSE MOUTH AFTER EACH USE**    Historical Provider, MD   Nutritional Supplements (BOOST) LIQD 1 can twice a day for meals 7/28/21   JULIAN Garcia CNP   albuterol sulfate HFA (VENTOLIN HFA) 108 (90 Base) MCG/ACT inhaler Inhale 2 puffs into the lungs every 6 hours as needed for Wheezing or Shortness of Breath    Historical Provider, MD   carboxymethylcellulose (REFRESH PLUS) 0.5 % SOLN ophthalmic solution Apply 1 drop to eye 4 times daily    Historical Provider, MD   ascorbic acid (VITAMIN C) 500 MG tablet Take 1 tablet by mouth daily 10/26/20   Brunilda Gilman PA-C   ferrous sulfate (FE TABS 325) 325 (65 Fe) MG EC tablet Take 1 tablet by mouth 2 times daily (with meals) 10/16/20   Pedro Miguel PA-C   SM LUBRICANT EYE DROPS 0.4-0.3 % ophthalmic solution PLACE 1 DROP INTO BOTH EYES FOUR TIMES DAILY AS NEEDED FOR DRY EYES 10/17/19   Brunilda Gilman PA-C   Handicap Placard MISC by Does not apply route 6/27/19   Brunilda Gilman PA-C   Incontinence Supply Disposable (INCONTINENCE BRIEF LARGE) MISC To use as directed.  Use 3x a day 4/30/19   Brunilda Gilman PA-C   [address] [facility] ([employer]I-[employer]ANCE ORTHOTICS MENS) MISC Provide insurance covered orthotics shoes, wear daily 12/12/18   Brunilda Gilman ADELINE   ipratropium-albuterol (DUONEB) 0.5-2.5 (3) MG/3ML SOLN nebulizer solution Inhale 1 vial into the lungs every 4 hours as needed     Historical Provider, MD   Armodafinil 200 MG TABS Take 1 tablet by mouth 2 times daily. 4/2/18   Historical Provider, MD       Current medications:    No current facility-administered medications for this visit. No current outpatient medications on file.      Facility-Administered Medications Ordered in Other Visits   Medication Dose Route Frequency Provider Last Rate Last Admin    sodium chloride flush 0.9 % injection 5-40 mL  5-40 mL IntraVENous 2 times per day Mindi Valadez, DO   10 mL at 01/29/22 2228    sodium chloride flush 0.9 % injection 5-40 mL  5-40 mL IntraVENous PRN Cathy I Silver, DO        0.9 % sodium chloride infusion  25 mL IntraVENous PRN Cathy I Silver, DO        ondansetron (ZOFRAN-ODT) disintegrating tablet 4 mg  4 mg Oral Q8H PRN Cathy I Silver, DO        Or    ondansetron (ZOFRAN) injection 4 mg  4 mg IntraVENous Q6H PRN Cathy I Silver, DO   4 mg at 01/30/22 0653    polyethylene glycol (GLYCOLAX) packet 17 g  17 g Oral Daily Cathy I Silver, DO        gabapentin (NEURONTIN) capsule 300 mg  300 mg Oral Q8H Cathy I Silver, DO        ibuprofen (ADVIL;MOTRIN) tablet 400 mg  400 mg Oral Q4H Cathy I Silver, DO   400 mg at 01/30/22 0400    methocarbamol (ROBAXIN) tablet 750 mg  750 mg Oral Q6H Cathy I Silver, DO   750 mg at 01/29/22 2303    bumetanide (BUMEX) tablet 2 mg  2 mg Oral Daily Osmin Nova MD        metoprolol succinate (TOPROL XL) extended release tablet 50 mg  50 mg Oral Nightly Osmin Nova MD        lipase-protease-amylase (CREON) delayed release capsule 24,000 Units  24,000 Units Oral TID WC Osmin Nova MD        [Held by provider] rivaroxaban Levonia Henle) tablet 20 mg  20 mg Oral Daily Osmin Nova MD        rOPINIRole (REQUIP) tablet 0.25 mg  0.25 mg Oral BID Osmin Nova MD        pantoprazole (PROTONIX) tablet 40 BPH (benign prostatic hyperplasia) N40.0    Myalgia M79.10    Atrial fibrillation with slow ventricular response ([facility]) I48.91    Pacemaker pocket hematoma, initial encounter T82.837A    Pneumonia-community-acquired J18.9    Acute hypoxemic respiratory failure (HCC) J96.01    SIRS (systemic inflammatory response syndrome) ([facility]) R65.10    Pulmonary hypertension ([facility]) I27.20    Presence of permanent cardiac pacemaker Z95.0    Aspiration pneumonia ([facility]) J69.0    SBO (small bowel obstruction) ([facility] K56.609    Fall at home, initial encounter W19. Kaitlin Murillo, J69.905       Past Medical History:        Diagnosis Date    Acute superficial gastritis without hemorrhage 05/26/2018    Anastomotic stricture of stomach     Asthma     Atrial fibrillation ([facility]     On Xarelto 6/27/2020    Calculus of gallbladder without cholecystitis without obstruction 05/02/2017    Chronic diastolic heart failure ([facility] 11/17/2017    Chronic rhinosinusitis 04/13/2015    Class 2 severe obesity due to excess calories with serious comorbidity and body mass index (BMI) of 36.0 to 36.9 in adult [facility]) 11/17/2017    COPD (chronic obstructive pulmonary disease) ([facility]     E. coli septicemia ([facility])     E. coli UTI     Foot drop, right 07/10/2012    Fracture of femur ([facility] 10/23/2016    Gait disorder 07/20/2016    Gastric out let obstruction     GERD (gastroesophageal reflux disease)     Hallux valgus, acquired, bilateral 06/24/2015    Hyperlipidemia     Hypertension     Impaired hearing 04/13/2015    Internal hemorrhoids 01/03/2017    Lymphedema of both lower extremities 06/24/2015    Nausea & vomiting 11/16/2018    OA (osteoarthritis) of knee, bilateral 12/11/2006    Obesity     MARIAMA on CPAP 05/02/2017    Osteoarthritis     Osteoarthritis of lumbar spine 12/13/2007     replace inactive diagnosis    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 coli ([facility])     Due to UTI     Sick sinus syndrome ([facility]     Simple chronic bronchitis ([facility] 04/10/2018    Slow transit constipation 11/03/2016    Unspecified sleep apnea     UTI (urinary tract infection)        Past Surgical History:        Procedure Laterality Date    CARPAL TUNNEL RELEASE      bilateral    COLONOSCOPY      COLONOSCOPY N/A 04/05/2021    COLONOSCOPY DIAGNOSTIC performed by Kamala Heard MD at [address]  01/02/2019    by Dr. Chelsea Morales N/A 01/02/2019    CYSTOSCOPY performed by Chino Young MD at [address]    first knuckle right index finger   [address] [facility]    vertical banded gastroplasty   [address] REPLACEMENT  2004 & 2005    bilateral knees    PACEMAKER INSERTION  04/09/2021    [place], placed by Dr. Heidi Hart EGD [address] N/A 08/16/2018    EGD FOREIGN BODY REMOVAL performed by Vanna Locke MD at [address] EGD TRANSORAL BIOPSY SINGLE/MULTIPLE N/A 05/25/2018    EGD BIOPSY performed by Chu Hartley DO at [address] [facility]      left shoulder    UPPER GASTROINTESTINAL ENDOSCOPY  08/13/2018    removal of food bolus    UPPER GASTROINTESTINAL ENDOSCOPY N/A 08/13/2018    EGD FOREIGN BODY REMOVAL performed by Chu Hartley DO at [facility] 08/13/2018    EGD BIOPSY performed by Chu Hartley DO at [address] [facility]  08/16/2018    egd with balloon dilitation    UPPER GASTROINTESTINAL ENDOSCOPY  08/16/2018    EGD DILATION BALLOON performed by Vanna Locke MD at [address] [place] [address] 10/01/2018    EGD BIOPSY performed by Kamala Heard MD at [address] [place] [address]  10/01/2018    EGD DILATION BALLOON performed by Kamala Heard MD at [address] [place] [address] N/A 11/19/2018    EGD DILATION BALLOON performed by Kamala Heard MD at hospitals End[facility]    UPPER GASTROINTESTINAL ENDOSCOPY N/A 10/15/2020    EGD SUBMUCOSAL/BOTOX INJECTION tattoo  performed by Cam Townsend MD at [address] [facility] N/A 2021    EGD BIOPSY performed by June Truong MD at [address] [facility] History:    Social History     Tobacco Use    Smoking status: Former Smoker     Packs/day: 1.00     Years: 20.00     Pack years: 20.00     Quit date: 1976     Years since quittin.1    Smokeless tobacco: Never Used   Substance Use Topics    Alcohol use: No                                Counseling given: Not Answered      Vital Signs (Current): There were no vitals filed for this visit. BP Readings from Last 3 Encounters:   22 (!) 104/58   01/10/22 (!) 104/59   11/10/21 114/69       NPO Status:                                                                                 BMI:   Wt Readings from Last 3 Encounters:   22 170 lb 14.4 oz (77.5 kg)   01/10/22 199 lb 8 oz (90.5 kg)   11/10/21 193 lb 9.6 oz (87.8 kg)     There is no height or weight on file to calculate BMI.    CBC:   Lab Results   Component Value Date    WBC 6.3 2022    RBC 3.34 2022    RBC 4.39 2011    HGB 10.4 2022    HCT 32.5 2022    MCV 97.3 2022    RDW 13.1 2022     2022     2011       CMP:   Lab Results   Component Value Date     2022    K 3.9 2022     2022    CO2 24 2022    BUN 17 2022    CREATININE 0.81 2022    GFRAA >60 2022    LABGLOM >60 2022    GLUCOSE 101 2022    GLUCOSE 99 2011    PROT 7.3 2022    CALCIUM 8.7 2022    BILITOT 0.73 2022    ALKPHOS 120 2022    AST 30 2022    ALT 12 2022       POC Tests: No results for input(s): POCGLU, POCNA, POCK, POCCL, POCBUN, POCHEMO, POCHCT in the last 72 hours.     Coags:   Lab Results Component Value Date    PROTIME 15.9 01/28/2022    PROTIME 16.6 08/16/2018    INR 1.5 01/28/2022    APTT 31.6 01/28/2022       HCG (If Applicable): No results found for: PREGTESTUR, PREGSERUM, HCG, HCGQUANT     ABGs: No results found for: PHART, PO2ART, KAP5OCB, XZU2GSA, BEART, Y6WLWFUB     Type & Screen (If Applicable):  No results found for: LABABO, LABRH    Drug/Infectious Status (If Applicable):  Lab Results   Component Value Date    HEPCAB NONREACTIVE 04/10/2017       COVID-19 Screening (If Applicable):   Lab Results   Component Value Date    COVID19 DETECTED 01/29/2022    COVID19 Not Detected 08/09/2021         EKG 1/28/2022  Ventricular-paced rhythm  Abnormal ECG  When compared with ECG of 11-OCT-2020 09:09,  Previous ECG has undetermined rhythm, needs review        TTE 1/29/2022  Left ventricle is normal in size. Global left ventricular systolic function  is difficult to assess due to heart rate and rhythm but appears normal.  Visually estimated EF 50%. Abnormal septal motion; may be due to pacemaker. Mild concentric left ventricular hypertrophy. No significant valvular regurgitation or stenosis seen        Anesthesia Evaluation  Patient summary reviewed and Nursing notes reviewed history of anesthetic complications (delayed emergence): Airway: Mallampati: II  TM distance: >3 FB   Neck ROM: full  Mouth opening: > = 3 FB Dental:    (+) edentulous      Pulmonary:normal exam  breath sounds clear to auscultation  (+) pneumonia: no interval change and unresolved,  COPD:  sleep apnea: on CPAP,  asthma:                            Cardiovascular:    (+) hypertension:, pacemaker: pacemaker, dysrhythmias: atrial fibrillation,       ECG reviewed  Rhythm: regular  Rate: normal                    Neuro/Psych:               GI/Hepatic/Renal:   (+) GERD:,          ROS comment: Hx vertical band gastroplasty with food bezoar .    Endo/Other:    (+) blood dyscrasia: anticoagulation therapy, anemia and thrombocytopenia, arthritis: OA., electrolyte abnormalities (hypokalemia), . Abdominal:             Vascular: Other Findings:               Anesthesia Plan      general     ASA 3       Induction: intravenous. MIPS: Postoperative opioids intended and Prophylactic antiemetics administered. Anesthetic plan and risks discussed with patient. Plan discussed with CRNA.                   Mendoza Sanchez MD   1/30/2022

## 2022-01-30 NOTE — ANESTHESIA POSTPROCEDURE EVALUATION
Department of Anesthesiology  Postprocedure Note    Patient: Ernesto Rodriguez  MRN: [id number]  YOB: 1945  Date of evaluation: 1/30/2022  Time:  1:08 PM     Procedure Summary     Date: 01/30/22 Room / Location: [address]    Anesthesia Start: Emry Heading Anesthesia Stop: 1115    Procedures:       LEFT CEPHALOMEDULLARY NAIL INSERTION (Left Hip)      KNEE TOTAL ARTHROPLASTY REVISION, REMOVAL OF INTRAMEDULLARY NAIL (Left Knee) Diagnosis: (LEFT FEMUR FRACTURE)    Surgeons: Holland Whittington DO Responsible Provider: Yari Tobias MD    Anesthesia Type: general ASA Status: 3          Anesthesia Type: general    Mark Phase I: Mark Score: 9    Mark Phase II:      Last vitals: Reviewed and per EMR flowsheets.      POST-OP ANESTHESIA NOTE       BP (!) 144/90   Pulse 85   Temp 98.2 °F (36.8 °C) (Axillary)   Resp 16   Ht 5' 10\" (1.778 m)   Wt 170 lb 14.4 oz (77.5 kg)   SpO2 100%   BMI 24.52 kg/m²    Pain Assessment: 0-10  Pain Level: 3       Anesthesia Post Evaluation    Patient location during evaluation: ICU  Patient participation: complete - patient cannot participate  Level of consciousness: sedated and ventilated  Pain score: 3  Airway patency: patent  Nausea & Vomiting: no vomiting and no nausea  Complications: no  Cardiovascular status: hemodynamically stable  Respiratory status: ventilator and intubated  Hydration status: stable

## 2022-01-30 NOTE — BRIEF OP NOTE
Brief Postoperative Note      Patient: Arvind Williamson  YOB: 1945  MRN: [id number]    Date of Procedure: 1/30/2022    Pre-Op Diagnosis: LEFT FEMUR FRACTURE    Post-Op Diagnosis:    Retained Deep orthopedic hardware left femur  Left intertrochanteric femur fracture       Procedure(s):  Removal of deep orthopedic hardware left femur  Cephalomedullary nail fixation left intertrochanteric femur fracture    Surgeon(s):  DO Ricardo aDley DO    Assistant:  Resident: Daryl Gosselin, DO; Moreno Ford DO; Phylicia Hartman DO    Anesthesia: General    Estimated Blood Loss (mL): 125 mL's    Fluids: 600 cc crystalloid     Complications: None    Specimens: None    Implants:  Alda 12 mm polyethylene  Synthes 12x400 mm TFNA  95 mm Helical blade    Implant Name Type Inv.  Item Serial No.  Lot No. LRB No. Used Action   BEARING TIB L79/83MM [id number] UNIV KNEE ARCM MED LAT ARTC  BEARING TIB L79/83MM [id number] UNIV KNEE ARCM MED LAT ARTC  [employer] [id number] Left 1 Implanted   COMPONENT TIB KNEE FAHAD BAR COMPLT SYS VANGUARD  COMPONENT TIB KNEE FAHAD BAR COMPLT SYS VANGUARD  [employer] [employer] [facility] [id number] Left 1 Implanted   INTRAMEDULLARY NAIL AND SCREWS      Left 1 Explanted   LEFT KNEE POLY      Left 1 Explanted   NAIL IM L400MM [id number] 130DEG LNG L PROX FEM GRN TI ANSELMO  NAIL IM L400MM [id number] 130DEG LNG L PROX FEM GRN TI ANSELMO  DEPUY [employer] [id number] Left 1 Implanted   BLADE SURG L100MM [id number] NONSTERILE TI ANSELMO FEN KEHINDE G  BLADE SURG L100MM [id number] NONSTERILE TI ANSELMO FEN KEHINDE G  DEPUY [employer] [facility]  Left 1 Implanted   SCREW BNE L56MM DIA5MM TIB LT GRN TI ST ANSELMO FAHAD FULL THRD  SCREW BNE L56MM DIA5MM TIB LT GRN TI ST ANSELMO FAHAD FULL THRD  DEPUY [employer]  Left 1 Implanted     Drains:   [REMOVED] NG/OG/NJ/NE Tube Nasogastric 16 fr Left nostril (Removed)   Surrounding Skin Intact 01/09/22 0600   Securement device Yes 01/09/22 0600   Status Clamped 01/09/22 0600   Placement Verified by Gastric Contents 01/08/22 1345   NG/OG/NJ/NE External Measurement (cm) 79 cm 01/08/22 1345   Drainage Appearance Brown 01/08/22 1345   Residual Volume (ml) 30 ml 01/08/22 2000   Output (mL) 300 ml 01/08/22 1345       Findings: Left kathryn-implant intertrochanteric femur fracture with retained deep orthopedic hardware. Please see op note for full details.     Electronically signed by Michelle Tanner DO on 1/30/2022 at 11:14 AM

## 2022-01-30 NOTE — PROGRESS NOTES
POST OP NOTE    SUBJECTIVE  Pt s/p CMN  Placement, pain somewhat controlled. OBJECTIVE  VITALS:  BP (!) 144/90   Pulse 85   Temp 98.2 °F (36.8 °C) (Axillary)   Resp 16   Ht 5' 10\" (1.778 m)   Wt 170 lb 14.4 oz (77.5 kg)   SpO2 100%   BMI 24.52 kg/m²         GENERAL:  awake and alert. No acute distress  CARDIOVASCULAR:  regular rate and rhythm   LUNGS:  No resp distress  ABDOMEN:   Abdomen soft, non-tender, non-distended  INCISION: dressing with slight strike through, swelling at the hip approx fist size, no bruising    ASSESSMENT  1. POD# 0 s/p CMN placement, post op H/H stable, repeat in am.  Poss hematoma- Ortho to re-eval and bulk dressing as needed. Will monitor.      PLAN  no changes to current plan, restart diet, PT tomorrow      Shannon Stiles MD  Trauma/Surgery Service  1/30/2022 at 3:56 PM

## 2022-01-30 NOTE — PROGRESS NOTES
PROGRESS NOTE          PATIENT NAME: [address] RECORD NO. [id number]  DATE: 1/30/2022  SURGEON:   PRIMARY CARE PHYSICIAN: JULIAN Lam CNP    HD: # 1    ASSESSMENT    Patient Active Problem List   Diagnosis    Chronic atrial fibrillation ([address]    Dyslipidemia    Gastroesophageal reflux disease without esophagitis    Osteoarthritis of lumbar spine    OA (osteoarthritis) of knee, bilateral    Foot drop, right    Hallux valgus, acquired, bilateral    Hearing impairment    Essential hypertension    Slow transit constipation    MARIAMA on CPAP    Simple chronic bronchitis (HCC)    Non-intractable vomiting    Hospital-acquired bacterial pneumonia    Pneumonia due to organism    COPD (chronic obstructive pulmonary disease) (HCC)    Chronic diastolic heart failure (HCC)    History of bariatric surgery including banding leading to esophageal stricture and aspiration    BPH (benign prostatic hyperplasia)    Myalgia    Atrial fibrillation with slow ventricular response ([address]    Pacemaker pocket hematoma, initial encounter    Pneumonia-community-acquired    Acute hypoxemic respiratory failure (HCC)    SIRS (systemic inflammatory response syndrome) (HCC)    Pulmonary hypertension (HCC)    Presence of permanent cardiac pacemaker    Aspiration pneumonia ([address]    SBO (small bowel obstruction) ([address]    Fall at home, initial encounter    Left displaced femoral neck fracture ([address]    Retained orthopedic hardware       MEDICAL DECISION MAKING AND PLAN    1. Left IT fracture - Ortho planning OR today    2. Cards following for CHF, afib, pacemaker   3. COVID postive- ID following   4. Would restart home meds in am     Chief Complaint: Hip pain    SUBJECTIVE    Elizabethann Saliva is has slightly improved and is unchanged since yesterday. He is ready for OR.  This patient was seen in conjunction with the resident and RN as he is COVID positive       OBJECTIVE  VITALS: Temp: Temp: 97 °F (36.1 °C)Temp  Av.3 °F (36.8 °C)  Min: 97 °F (36.1 °C)  Max: 99.2 °F (87.4 °C) BP Systolic (57NNC), BPU:632 , Min:88 , THH:333   Diastolic (22UBN), EJP:64, Min:55, Max:90   Pulse Pulse  Av  Min: 70  Max: 97 Resp Resp  Avg: 10.7  Min: 0  Max: 36 Pulse ox SpO2  Av.6 %  Min: 85 %  Max: 100 %  GENERAL: alert, cooperative, mild distress  NEURO: awake alert and following commands   HEENT: NC, AT PERRLA  : normal  LUNGS: clear to auscultation bilaterally- no wheezes, rales or rhonchi, normal air movement, no respiratory distress and clear to ausculation, without wheezes, rales or rhonci  HEART: normal rate, regular rhythm, normal S1 and S2, no murmurs, rubs, clicks or gallops, distal pulses intact, no carotid bruits, normal rate and regular rhythm  ABDOMEN: soft, non-tender, non-distended, normal bowel sounds, no masses or organomegaly, soft, non-tender, non-distended, bowel sounds present in all 4 quadrants and no guarding or peritoneal signs present  EXTREMITY: no cyanosis, clubbing or edema    I/O last 3 completed shifts: In: 378 [P.O.:240; I.V.:138]  Out: 1050 [Urine:1050]    Drain/tube output: In: 582 [P.O.:240;  I.V.:738]  Out: 1050 [Urine:1050]    LAB:  CBC:   Recent Labs     22  0644 22  0600   WBC 6.3 7.0 6.3   HGB 11.3* 11.1* 10.4*   HCT 34.4* 33.9* 32.5*   MCV 95.3 96.6 97.3    198 159     BMP:   Recent Labs     22  0644 22  0600    137 137   K 4.0 3.9 3.7    102 101   CO2 26 24 28   BUN 20 17 13   CREATININE 0.90 0.81 0.73   GLUCOSE 92 101* 103*     COAGS:   Recent Labs     228   APTT 31.6*   INR 1.5         Rudy Mahmood DO  22, 11:47 AM

## 2022-01-31 PROBLEM — J15.9 HOSPITAL-ACQUIRED BACTERIAL PNEUMONIA: Status: RESOLVED | Noted: 2020-06-27 | Resolved: 2022-01-31

## 2022-01-31 PROBLEM — K56.609 SBO (SMALL BOWEL OBSTRUCTION) (HCC): Status: RESOLVED | Noted: 2022-01-07 | Resolved: 2022-01-31

## 2022-01-31 PROBLEM — R11.10 NON-INTRACTABLE VOMITING: Status: RESOLVED | Noted: 2018-11-16 | Resolved: 2022-01-31

## 2022-01-31 PROBLEM — J41.0 SIMPLE CHRONIC BRONCHITIS (HCC): Status: RESOLVED | Noted: 2018-04-10 | Resolved: 2022-01-31

## 2022-01-31 PROBLEM — J18.9 PNEUMONIA DUE TO ORGANISM: Status: RESOLVED | Noted: 2020-10-11 | Resolved: 2022-01-31

## 2022-01-31 PROBLEM — J18.9 PNEUMONIA: Status: RESOLVED | Noted: 2021-05-01 | Resolved: 2022-01-31

## 2022-01-31 PROBLEM — J96.01 ACUTE HYPOXEMIC RESPIRATORY FAILURE (HCC): Status: RESOLVED | Noted: 2021-05-01 | Resolved: 2022-01-31

## 2022-01-31 PROBLEM — J69.0 ASPIRATION PNEUMONIA (HCC): Status: RESOLVED | Noted: 2021-07-14 | Resolved: 2022-01-31

## 2022-01-31 PROBLEM — R65.10 SIRS (SYSTEMIC INFLAMMATORY RESPONSE SYNDROME) (HCC): Status: RESOLVED | Noted: 2021-05-03 | Resolved: 2022-01-31

## 2022-01-31 LAB
ABSOLUTE EOS #: <0.03 K/UL (ref 0–0.44)
ABSOLUTE IMMATURE GRANULOCYTE: 0.08 K/UL (ref 0–0.3)
ABSOLUTE LYMPH #: 0.74 K/UL (ref 1.1–3.7)
ABSOLUTE MONO #: 0.95 K/UL (ref 0.1–1.2)
ANION GAP SERPL CALCULATED.3IONS-SCNC: 11 MMOL/L (ref 9–17)
ANION GAP SERPL CALCULATED.3IONS-SCNC: 8 MMOL/L (ref 9–17)
BASOPHILS # BLD: 0 % (ref 0–2)
BASOPHILS ABSOLUTE: <0.03 K/UL (ref 0–0.2)
BLOOD BANK SPECIMEN: NORMAL
BUN BLDV-MCNC: 21 MG/DL (ref 8–23)
BUN BLDV-MCNC: 22 MG/DL (ref 8–23)
BUN/CREAT BLD: ABNORMAL (ref 9–20)
BUN/CREAT BLD: ABNORMAL (ref 9–20)
CALCIUM SERPL-MCNC: 7.9 MG/DL (ref 8.6–10.4)
CALCIUM SERPL-MCNC: 8.1 MG/DL (ref 8.6–10.4)
CHLORIDE BLD-SCNC: 95 MMOL/L (ref 98–107)
CHLORIDE BLD-SCNC: 98 MMOL/L (ref 98–107)
CO2: 24 MMOL/L (ref 20–31)
CO2: 24 MMOL/L (ref 20–31)
CREAT SERPL-MCNC: 1.21 MG/DL (ref 0.7–1.2)
CREAT SERPL-MCNC: 1.33 MG/DL (ref 0.7–1.2)
DIFFERENTIAL TYPE: ABNORMAL
EOSINOPHILS RELATIVE PERCENT: 0 % (ref 1–4)
GFR AFRICAN AMERICAN: >60 ML/MIN
GFR AFRICAN AMERICAN: >60 ML/MIN
GFR NON-AFRICAN AMERICAN: 52 ML/MIN
GFR NON-AFRICAN AMERICAN: 58 ML/MIN
GFR SERPL CREATININE-BSD FRML MDRD: ABNORMAL ML/MIN/{1.73_M2}
GLUCOSE BLD-MCNC: 104 MG/DL (ref 70–99)
GLUCOSE BLD-MCNC: 123 MG/DL (ref 70–99)
HCT VFR BLD CALC: 22.7 % (ref 40.7–50.3)
HCT VFR BLD CALC: 23.5 % (ref 40.7–50.3)
HEMOGLOBIN: 7.4 G/DL (ref 13–17)
HEMOGLOBIN: 7.5 G/DL (ref 13–17)
IMMATURE GRANULOCYTES: 1 %
LYMPHOCYTES # BLD: 7 % (ref 24–43)
MCH RBC QN AUTO: 31.4 PG (ref 25.2–33.5)
MCHC RBC AUTO-ENTMCNC: 32.6 G/DL (ref 28.4–34.8)
MCV RBC AUTO: 96.2 FL (ref 82.6–102.9)
MONOCYTES # BLD: 9 % (ref 3–12)
NRBC AUTOMATED: 0 PER 100 WBC
PDW BLD-RTO: 13.2 % (ref 11.8–14.4)
PLATELET # BLD: 176 K/UL (ref 138–453)
PLATELET ESTIMATE: ABNORMAL
PMV BLD AUTO: 10.4 FL (ref 8.1–13.5)
POTASSIUM SERPL-SCNC: 3.7 MMOL/L (ref 3.7–5.3)
POTASSIUM SERPL-SCNC: 3.9 MMOL/L (ref 3.7–5.3)
RBC # BLD: 2.36 M/UL (ref 4.21–5.77)
RBC # BLD: ABNORMAL 10*6/UL
SEG NEUTROPHILS: 83 % (ref 36–65)
SEGMENTED NEUTROPHILS ABSOLUTE COUNT: 8.84 K/UL (ref 1.5–8.1)
SODIUM BLD-SCNC: 127 MMOL/L (ref 135–144)
SODIUM BLD-SCNC: 133 MMOL/L (ref 135–144)
WBC # BLD: 10.7 K/UL (ref 3.5–11.3)
WBC # BLD: ABNORMAL 10*3/UL

## 2022-01-31 PROCEDURE — 99232 SBSQ HOSP IP/OBS MODERATE 35: CPT | Performed by: NURSE PRACTITIONER

## 2022-01-31 PROCEDURE — 6370000000 HC RX 637 (ALT 250 FOR IP): Performed by: STUDENT IN AN ORGANIZED HEALTH CARE EDUCATION/TRAINING PROGRAM

## 2022-01-31 PROCEDURE — 94640 AIRWAY INHALATION TREATMENT: CPT

## 2022-01-31 PROCEDURE — 6370000000 HC RX 637 (ALT 250 FOR IP): Performed by: HEALTH CARE PROVIDER

## 2022-01-31 PROCEDURE — 85014 HEMATOCRIT: CPT

## 2022-01-31 PROCEDURE — 86900 BLOOD TYPING SEROLOGIC ABO: CPT

## 2022-01-31 PROCEDURE — 85018 HEMOGLOBIN: CPT

## 2022-01-31 PROCEDURE — 85025 COMPLETE CBC W/AUTO DIFF WBC: CPT

## 2022-01-31 PROCEDURE — 86901 BLOOD TYPING SEROLOGIC RH(D): CPT

## 2022-01-31 PROCEDURE — 36415 COLL VENOUS BLD VENIPUNCTURE: CPT

## 2022-01-31 PROCEDURE — P9016 RBC LEUKOCYTES REDUCED: HCPCS

## 2022-01-31 PROCEDURE — 97166 OT EVAL MOD COMPLEX 45 MIN: CPT

## 2022-01-31 PROCEDURE — 1200000000 HC SEMI PRIVATE

## 2022-01-31 PROCEDURE — 97530 THERAPEUTIC ACTIVITIES: CPT

## 2022-01-31 PROCEDURE — 86920 COMPATIBILITY TEST SPIN: CPT

## 2022-01-31 PROCEDURE — 2580000003 HC RX 258: Performed by: STUDENT IN AN ORGANIZED HEALTH CARE EDUCATION/TRAINING PROGRAM

## 2022-01-31 PROCEDURE — 86850 RBC ANTIBODY SCREEN: CPT

## 2022-01-31 PROCEDURE — 97163 PT EVAL HIGH COMPLEX 45 MIN: CPT

## 2022-01-31 PROCEDURE — 36430 TRANSFUSION BLD/BLD COMPNT: CPT

## 2022-01-31 PROCEDURE — 97535 SELF CARE MNGMENT TRAINING: CPT

## 2022-01-31 PROCEDURE — 80048 BASIC METABOLIC PNL TOTAL CA: CPT

## 2022-01-31 PROCEDURE — 97116 GAIT TRAINING THERAPY: CPT

## 2022-01-31 RX ORDER — SODIUM CHLORIDE 9 MG/ML
INJECTION, SOLUTION INTRAVENOUS PRN
Status: DISCONTINUED | OUTPATIENT
Start: 2022-01-31 | End: 2022-01-31

## 2022-01-31 RX ADMIN — METHOCARBAMOL TABLETS 750 MG: 750 TABLET, COATED ORAL at 17:50

## 2022-01-31 RX ADMIN — SODIUM CHLORIDE, PRESERVATIVE FREE 10 ML: 5 INJECTION INTRAVENOUS at 08:45

## 2022-01-31 RX ADMIN — PANCRELIPASE 24000 UNITS: 24000; 76000; 120000 CAPSULE, DELAYED RELEASE PELLETS ORAL at 12:25

## 2022-01-31 RX ADMIN — ACETAMINOPHEN 1000 MG: 325 TABLET ORAL at 06:18

## 2022-01-31 RX ADMIN — ACETAMINOPHEN 1000 MG: 325 TABLET ORAL at 15:26

## 2022-01-31 RX ADMIN — POLYETHYLENE GLYCOL 3350 17 G: 17 POWDER, FOR SOLUTION ORAL at 08:46

## 2022-01-31 RX ADMIN — BUDESONIDE AND FORMOTEROL FUMARATE DIHYDRATE 2 PUFF: 160; 4.5 AEROSOL RESPIRATORY (INHALATION) at 20:30

## 2022-01-31 RX ADMIN — TAMSULOSIN HYDROCHLORIDE 0.4 MG: 0.4 CAPSULE ORAL at 08:45

## 2022-01-31 RX ADMIN — GABAPENTIN 300 MG: 300 CAPSULE ORAL at 15:26

## 2022-01-31 RX ADMIN — GABAPENTIN 300 MG: 300 CAPSULE ORAL at 06:18

## 2022-01-31 RX ADMIN — PANTOPRAZOLE SODIUM 40 MG: 40 TABLET, DELAYED RELEASE ORAL at 08:45

## 2022-01-31 RX ADMIN — ROPINIROLE HYDROCHLORIDE 0.25 MG: 0.25 TABLET, FILM COATED ORAL at 08:45

## 2022-01-31 RX ADMIN — PANCRELIPASE 24000 UNITS: 24000; 76000; 120000 CAPSULE, DELAYED RELEASE PELLETS ORAL at 17:49

## 2022-01-31 RX ADMIN — BUMETANIDE 2 MG: 1 TABLET ORAL at 08:45

## 2022-01-31 RX ADMIN — SODIUM CHLORIDE, PRESERVATIVE FREE 10 ML: 5 INJECTION INTRAVENOUS at 20:48

## 2022-01-31 RX ADMIN — METHOCARBAMOL TABLETS 750 MG: 750 TABLET, COATED ORAL at 12:25

## 2022-01-31 RX ADMIN — IBUPROFEN 400 MG: 400 TABLET, FILM COATED ORAL at 03:27

## 2022-01-31 RX ADMIN — ROPINIROLE HYDROCHLORIDE 0.25 MG: 0.25 TABLET, FILM COATED ORAL at 20:47

## 2022-01-31 RX ADMIN — ACETAMINOPHEN 1000 MG: 325 TABLET ORAL at 20:30

## 2022-01-31 RX ADMIN — IBUPROFEN 400 MG: 400 TABLET, FILM COATED ORAL at 08:45

## 2022-01-31 RX ADMIN — SODIUM CHLORIDE, PRESERVATIVE FREE 10 ML: 5 INJECTION INTRAVENOUS at 08:46

## 2022-01-31 RX ADMIN — FERROUS SULFATE TAB EC 325 MG (65 MG FE EQUIVALENT) 325 MG: 325 (65 FE) TABLET DELAYED RESPONSE at 12:25

## 2022-01-31 RX ADMIN — PANCRELIPASE 24000 UNITS: 24000; 76000; 120000 CAPSULE, DELAYED RELEASE PELLETS ORAL at 08:45

## 2022-01-31 RX ADMIN — METHOCARBAMOL TABLETS 750 MG: 750 TABLET, COATED ORAL at 06:18

## 2022-01-31 RX ADMIN — BUDESONIDE AND FORMOTEROL FUMARATE DIHYDRATE 2 PUFF: 160; 4.5 AEROSOL RESPIRATORY (INHALATION) at 08:48

## 2022-01-31 ASSESSMENT — PAIN DESCRIPTION - LOCATION
LOCATION: HIP
LOCATION: LEG
LOCATION: LEG;HIP;FOOT
LOCATION: HIP

## 2022-01-31 ASSESSMENT — PAIN SCALES - GENERAL
PAINLEVEL_OUTOF10: 6
PAINLEVEL_OUTOF10: 4
PAINLEVEL_OUTOF10: 3
PAINLEVEL_OUTOF10: 4
PAINLEVEL_OUTOF10: 7

## 2022-01-31 ASSESSMENT — PAIN DESCRIPTION - FREQUENCY
FREQUENCY: INTERMITTENT
FREQUENCY: CONTINUOUS

## 2022-01-31 ASSESSMENT — PAIN DESCRIPTION - PAIN TYPE
TYPE: SURGICAL PAIN;ACUTE PAIN
TYPE: ACUTE PAIN

## 2022-01-31 ASSESSMENT — PAIN DESCRIPTION - ORIENTATION
ORIENTATION: LEFT

## 2022-01-31 ASSESSMENT — ENCOUNTER SYMPTOMS
APNEA: 0
NAUSEA: 0
EYE DISCHARGE: 0
COLOR CHANGE: 0
ABDOMINAL DISTENTION: 0

## 2022-01-31 ASSESSMENT — PAIN - FUNCTIONAL ASSESSMENT: PAIN_FUNCTIONAL_ASSESSMENT: PREVENTS OR INTERFERES SOME ACTIVE ACTIVITIES AND ADLS

## 2022-01-31 ASSESSMENT — PAIN DESCRIPTION - DESCRIPTORS
DESCRIPTORS: ACHING;SORE;STABBING
DESCRIPTORS: STABBING
DESCRIPTORS: STABBING
DESCRIPTORS: ACHING;DISCOMFORT

## 2022-01-31 NOTE — CARE COORDINATION
Dispo planning     Called pt's room [id number]. Discussed discharge planning. Pt states he lives with his wife at home. Pt reports he did get up to the chair but felt dizzy. Updated Trauma team. Discussed potential for acute rehab. Pt would like to discuss rehab with his wife. Will follow up with pt and wife. PM&R added.

## 2022-01-31 NOTE — PROGRESS NOTES
PROGRESS NOTE      PATIENT NAME: Jluis Condon RECORD NO. [id number]  DATE: 1/31/2022  SURGEON: Jennifer Arceo  PRIMARY CARE PHYSICIAN: JULIAN Black - CNP    HD: # 2    ASSESSMENT    Patient Active Problem List   Diagnosis    [facility]    Dyslipidemia    Gastroesophageal reflux disease without esophagitis    Osteoarthritis of lumbar spine    OA (osteoarthritis) of knee, bilateral    Foot drop, right    Hallux valgus, acquired, bilateral    Hearing impairment    Essential hypertension    Slow transit constipation    MARIAMA on CPAP    Simple chronic bronchitis (HCC)    Non-intractable vomiting    Hospital-acquired bacterial pneumonia    Pneumonia due to organism    COPD (chronic obstructive pulmonary disease) ([address]    Chronic diastolic heart failure (HCC)    History of bariatric surgery including banding leading to esophageal stricture and aspiration    BPH (benign prostatic hyperplasia)    Myalgia    Atrial fibrillation with slow ventricular response ([address]    Pacemaker pocket hematoma, initial encounter    Pneumonia-community-acquired    Acute hypoxemic respiratory failure (HCC)    SIRS (systemic inflammatory response syndrome) (HCC)    Pulmonary hypertension (HCC)    Presence of permanent cardiac pacemaker    Aspiration pneumonia ([address]    SBO (small bowel obstruction) ([address]    Fall at home, initial encounter    Left displaced femoral neck fracture ([address]    Retained orthopedic hardware    COVID-19    Elevated C-reactive protein (CRP)       MEDICAL DECISION MAKING AND PLAN    Neuro -MMPT -tylenol, gabapentin, robaxin, home requip  CV -hypotension overnight. Receiving 1 unit PRBCs this morning. Heme: Hgb -7.4 DVT ppx-start DVT prophylaxis today  Pulm-encourage incentive spirometry, 2 L nasal cannula for COPD.   Renal -voiding spontaneously, monitor creatinine 1.33  GI -regular diet  ID -infectious disease on board for Covid received Sotrivimab   Endo- glucose less than 180  MSK-status post left femur CMN, weightbearing as tolerated left lower extremity  Dispo -PT OT discharge planning. SUBJECTIVE    Mike Gillette was seen and examined at bedside. Patient had episodes of hypotension overnight and received a liter bolus of fluid. Receiving 1 unit PRBCs this morning. Patient denies dizziness shortness of breath nausea vomiting. Voiding spontaneously. I-S 1000      OBJECTIVE  VITALS: Temp: Temp: 97.9 °F (36.6 °C)Temp  Av.9 °F (36.6 °C)  Min: 97.7 °F (36.5 °C)  Max: 98.2 °F (65.5 °C) BP Systolic (31SZH), AJF:63 , Min:65 , YYI:588   Diastolic (98VJA), SBW:63, Min:48, Max:64   Pulse Pulse  Av.8  Min: 70  Max: 76 Resp Resp  Avg: 15.8  Min: 13  Max: 20 Pulse ox SpO2  Av.8 %  Min: 92 %  Max: 100 %    CONSTITUTIONAL: awake, alert, cooperative, no apparent distress  HEAD: atraumatic, normocephalic  EYES: sclera clear, pupils equal and reactive to light  ENT: ears are symmetric, nares patent   HEENT: moist mucous membranes  NECK: Supple, symmetrical, trachea midline  LUNGS: no respiratory distress, no audible wheezing, no chest wall tenderness, AICD palpable in left chest  CARDIOVASCULAR: Regular rate and rhythm  ABDOMEN: soft, nontender, nondistended, no guarding, no rebound tenderness   MUSCULOSKELETAL: Limited range of motion of left lower extremity, tender to palpation at the thigh, hematoma at the knee, ecchymosis, dressing with strikethrough  NEUROLOGIC: Awake, alert, oriented to name, place and time  EXTREMITIES: peripheral pulses normal, no pedal edema, no calf tenderness  SKIN: normal coloration and turgor    I/O last 3 completed shifts: In: 0934 [P.O.:360; I.V.:738]  Out: 850 [Urine:850]    Drain/tube output: In: 5 [P.O.:120;  I.V.:600]  Out: 100 [Urine:100]    LAB:  CBC:   Recent Labs     22  0644 22  0644 22  0600 22  0600 22  1436 22  0002 22  0555   WBC 7.0  --  6.3  --   -- CONTRAST   Final Result   No acute fracture or malalignment of the cervical spine. Multiple nodules within the right thyroid lobe and correlation with   nonemergent sonography is recommended. XR SHOULDER LEFT (MIN 2 VIEWS)   Final Result   No acute osseous or soft tissue abnormality. Degenerative change of the left shoulder joint spaces with evidence of prior   rotator cuff repair. XR FEMUR LEFT (MIN 2 VIEWS)   Final Result   Intertrochanteric fracture of the proximal left femur. XR KNEE LEFT (3 VIEWS)   Final Result   No acute osseous or soft tissue abnormality. XR HIP W PELVIS MIN 5 VWS BILATERAL   Final Result   Intertrochanteric fracture of the proximal left femur. Светлана Pennington MD  01/31/22, 11:58 AM       Attending Note      I have reviewed the above [facility] note(s) and I either performed the key elements of the medical history and physical exam or was present with the trauma resident when the key elements of the medical history and physical exam were performed. I have discussed the findings, established the care plan and recommendations with the trauma team.  Monitor Hb.     Radha Figueroa MD  1/31/2022  12:10 PM

## 2022-01-31 NOTE — PROGRESS NOTES
Occupational Therapy   Occupational Therapy Initial Assessment  Date: 2022   Patient Name: Priya Mesa  MRN: [id number]     : 1945    Date of Service: 2022     Chief Complaint   Patient presents with   Harris Fall    Hip Pain       Discharge Recommendations:  Further therapy recommended at discharge. The patient should be able to tolerate at least 3 hours of therapy per day over 5 days or 15 hours over 7 days. This patient may benefit from a Physical Medicine and Rehab consult. OT Equipment Recommendations  Equipment Needed: Yes  Mobility Devices: ADL Assistive Devices; Corita Anabel: Rolling  ADL Assistive Devices: Long-handled Sponge;Long-handled Shoe Horn;Sock-Aid Hard    Assessment   Performance deficits / Impairments: Decreased functional mobility ; Decreased ADL status; Decreased strength;Decreased ROM; Decreased endurance;Decreased balance;Decreased high-level IADLs  Assessment: Pt supine in bed upon arrival and agreeable to OT eval. Pt completed supine to sit transfer EOB with Mod A x 2 for LE and trunk progression d/t pain and fatigue. OT facilitated donning BLE AFOs and shoes with Max A requried d/t pain, dizziness and fatigue. Pt required mod A x 2 for sit<>stand transfers and mod A x 2 for functional mobility EOB to bedside recliner. Assist to progress LLE, manage walker and for balance d/t pain and decreased endurance. Pt limited by dizziness throguhout. BP taken often w/ systolic 28-94. Pt systolic 84 upon arrival. Pt retired to bedside recliner upon end of session with call light in reach. Pt is expected to require skilled OT services during their acute hospitalization stay to address the above noted deficits through skilled occupational therapy intervention for promotion of increased independence throughout ADLs, IADLs and functional mobility tasks.    Prognosis: Good  Decision Making: Medium Complexity  Patient Education: Pt educated on OT role, OT POC, activity promotion, WB status, transfer training, safety awareness-good return  REQUIRES OT FOLLOW UP: Yes  Activity Tolerance  Activity Tolerance: Patient limited by pain; Patient limited by fatigue;Patient Tolerated treatment well  Activity Tolerance: Limited by dizziness  Safety Devices  Safety Devices in place: Yes  Type of devices: Gait belt;Call light within reach; Left in chair;Nurse notified  Restraints  Initially in place: No           Patient Diagnosis(es): The encounter diagnosis was Left displaced femoral neck fracture ([facility] [address] has a past medical history of Acute superficial gastritis without hemorrhage, Anastomotic stricture of stomach, Asthma, Atrial fibrillation ([address] Calculus of gallbladder without cholecystitis without obstruction, Chronic diastolic heart failure (HCC), Chronic rhinosinusitis, Class 2 severe obesity due to excess calories with serious comorbidity and body mass index (BMI) of 36.0 to 36.9 in [facility]), COPD (chronic obstructive pulmonary disease) ([facility] [address] E. coli septicemia ([facility] [address] E. coli UTI, Foot drop, right, Fracture of femur ([facility] [address] Gait disorder, Gastric out let obstruction, GERD (gastroesophageal reflux disease), Hallux valgus, acquired, bilateral, Hyperlipidemia, Hypertension, Impaired hearing, Internal hemorrhoids, Lymphedema of both lower extremities, Nausea & vomiting, OA (osteoarthritis) of knee, bilateral, Obesity, MARIAMA on CPAP, Osteoarthritis, Osteoarthritis of lumbar spine, S/P revision of total knee, Septicemia due to E. coli ([address] Sick sinus syndrome ([address] Simple chronic bronchitis ([address] Slow transit constipation, Unspecified sleep apnea, and UTI (urinary tract infection). has a past surgical history that includes Finger amputation (1966); Gastric bypass surgery (1985); Carpal tunnel release; Colonoscopy; Rotator cuff repair; joint replacement (2004 & 2005); Hemorrhoid surgery; pr egd transoral biopsy single/multiple (N/A, 05/25/2018); Upper gastrointestinal endoscopy (08/13/2018);  Upper gastrointestinal endoscopy (N/A, 08/13/2018); Upper gastrointestinal endoscopy (N/A, 08/13/2018); Upper gastrointestinal endoscopy (08/16/2018); pr egd flexible foreign body removal (N/A, 08/16/2018); Upper gastrointestinal endoscopy (08/16/2018); Upper gastrointestinal endoscopy (N/A, 10/01/2018); Upper gastrointestinal endoscopy (10/01/2018); Upper gastrointestinal endoscopy (N/A, 11/19/2018); Cystoscopy (01/02/2019); Cystoscopy (N/A, 01/02/2019); Upper gastrointestinal endoscopy (N/A, 10/15/2020); Colonoscopy (N/A, 04/05/2021); Pacemaker insertion (04/09/2021); Upper gastrointestinal endoscopy (N/A, 7/16/2021); hip surgery (Left, 01/30/2022); knee surgery (Left, 01/30/2022); hip surgery (Left, 01/30/2022); Revision Total Knee Arthroplasty (Left, 01/30/2022); Femur fracture surgery (Left, 1/30/2022); and Revision total knee arthroplasty (Left, 1/30/2022). Restrictions  Restrictions/Precautions  Restrictions/Precautions: Weight Bearing,Isolation,Fall Risk (COVID-19/droplet-plus)  Required Braces or Orthoses?: Yes  Lower Extremity Weight Bearing Restrictions  Left Lower Extremity Weight Bearing: Weight Bearing As Tolerated  Required Braces or Orthoses  Right Lower Extremity Brace: Ankle Foot Orthotics  Left Lower Extremity Brace: Erika Foot Orthotics  Position Activity Restriction  Other position/activity restrictions: 1/30 L IT fx s/p HWR w/ CMN-fixation; wears BLE AFOs at baseline d/t dropfoot    Subjective   General  Patient assessed for rehabilitation services?: Yes  Family / Caregiver Present: No  General Comment  Comments: RN ok'd for OT/PT eval this AM. Pt agreeable to session, pleasent/cooperative throughout. Patient Currently in Pain: Yes  Pain Assessment  Pain Assessment: 0-10  Pain Level: 7  Pain Type: Acute pain  Pain Location: Leg;Hip; Foot  Pain Orientation: Left  Pain Descriptors: Aching;Sore;Stabbing  Pain Frequency: Continuous  Non-Pharmaceutical Pain Intervention(s): Ambulation/Increased Activity; Distraction; Emotional support  Response to Pain Intervention: Patient Satisfied  Vital Signs  Patient Currently in Pain: Yes     Social/Functional History  Social/Functional History  Lives With: Spouse,Daughter  Type of Home: House  Home Layout: Two level,Able to Live on Main level with bedroom/bathroom  Home Access: Stairs to enter without rails  Entrance Stairs - Number of Steps: 5  Bathroom Shower/Tub: Tub/Shower unit  Bathroom Toilet: Standard  Bathroom Equipment: Tub transfer bench,Grab bars in shower,Grab bars around [place] wheeled walker (uses rollator at a baseline)  ADL Assistance: [address]: [address] [facility] Responsibilities: No (dtr performs majority or cooking, cleaning, laundry)  Ambulation Assistance: Independent  Transfer Assistance: Independent  Active : No  Patient's  Info: family drives or takes a cab  Occupation: Retired  Type of occupation: teaching  Leisure & Hobbies: talking to friends, being social  Additional Comments: Pt reports that his wife is retired and able to assist as needed       Objective   Vision: Impaired  Vision Exceptions: Wears glasses at all times  Hearing: Exceptions to [facility]  Hearing Exceptions: Bilateral hearing aid      Orientation  Overall Orientation Status: Within Functional Limits    Balance  Sitting Balance: Stand by assistance (Pt sat EOB unsupported for ~12 minutes of ROM/MMT and LB dressing)  Standing Balance: Contact guard assistance  Standing Balance  Time: 2.5 minutes (2 minutes first bout, 30 seconds second bout)  Activity: static standing EOB  Comment: Pt limited by dizziness upon standing. BP taken and systolic 85, which matched pt BP prior to standing. Significant time to reach full stand but then able to complete standing with CGA. Completed weight shifts to prep for functional mobility.     Functional Mobility  Functional - Mobility Device: Rolling Walker  Activity: Other  Assist Level: Moderate assistance (Mod A x 2)  Functional Mobility Comments: Completed functional mobility EOB to bedside chair. Significant time to progress LEs, specifically LLE w/ mod verbal cues for initiation and sequencing being provided by PT. Pt required assist towards end of mobility to physically progress LLE d/t pain and fatigue. Assist for balance, to progress LLE and and manage walker. ADL  Feeding: Independent  Grooming: Modified independent ;Setup; Increased time to complete  UE Bathing: Stand by assistance;Setup;Verbal cueing; Increased time to complete  LE Bathing: Moderate assistance; Increased time to complete;Setup;Verbal cueing  UE Dressing: Minimal assistance; Increased time to complete  LE Dressing: Maximum assistance; Increased time to complete;Setup  Toileting: Maximum assistance; Increased time to complete;Setup  Additional Comments: OT facilitated pt donning BLE AFOs/shoes EOB unsupported. Pt required Max A to complete d/t significant pain in LLE, dizziness and decreased endurance. Tone RUE  RUE Tone: Normotonic  Tone LUE  LUE Tone: Normotonic  Coordination  Movements Are Fluid And Coordinated: Yes     Bed mobility  Supine to Sit: Moderate assistance;2 Person assistance (Assist for trunk progression and LLE progression d/t pain and decreased strength)  Sit to Supine:  (Pt retied to bedside chair at end of session)  Scooting: Moderate assistance (Assist d/t pain and decreased strength)  Comment: HOB elevate 50 degrees per pt requset-pt has hospital bed at home. Increased time/effort required and use of bedrail. Transfers  Sit to stand: Moderate assistance;2 Person assistance  Stand to sit: Moderate assistance;2 Person assistance  Transfer Comments: Completed 2x sit<>stand functional transfers with RW. Significant time to reach full stand.      Cognition  Overall Cognitive Status: WFL        Sensation  Overall Sensation Status: WFL (Denies any numnbess/tingling)        LUE AROM (degrees)  LUE AROM : Exceptions  L Shoulder Flexion 0-180: 0-60 w/ compensation of elbow flexion; baseline d/t rotator cuff tear.  Distal to shoulder WFL  Left Hand AROM (degrees)  Left Hand AROM: WFL  RUE AROM (degrees)  RUE AROM : Exceptions  R Shoulder Flexion 0-180: 0-120 w/ compensation of elbow flexion; baseline d/t rotator cuff tear  Right Hand AROM (degrees)  Right Hand AROM: WFL  LUE Strength  Gross LUE Strength: Exceptions to [facility]  L Shoulder Flex: 3-/5  L Shoulder Ext: 3-/5  L Elbow Flex: 4-/5  L Elbow Ext: 4-/5  L Hand General: 4-/5  RUE Strength  Gross RUE Strength: Exceptions to [facility]  R Shoulder Flex: 3-/5  R Shoulder Ext: 3-/5  R Elbow Flex: 4-/5  R Elbow Ext: 4-/5  R Hand General: 4-/5           Plan   Plan  Times per week: 4-5x/wk  Current Treatment Recommendations: Safety Education & Training,Self-Care / ADL,Patient/Caregiver Education & Training,Balance Training,Functional Mobility Training,Equipment Evaluation, Education, & procurement,Home Management Training,Endurance Training      AM-PAC Score        AM-[facility] Inpatient Daily Activity Raw Score: 17 (01/31/22 1616)  AM-PAC Inpatient ADL T-Scale Score : 37.26 (01/31/22 1616)  ADL Inpatient CMS 0-100% Score: 50.11 (01/31/22 1616)  ADL Inpatient CMS G-Code Modifier : CK (01/31/22 1616)    Goals  Short term goals  Time Frame for Short term goals: By discharge, pt will:  Short term goal 1: Demo bed mobility sit<>supine with Min A  Short term goal 2: Demo functional sit<>stand transfers with Min A and LRD PRN  Short term goal 3: Demo functional mobility with Mod A and LRD PRN  Short term goal 4: Demo +5 minutes of dynamic standing balance with Min A to promote increased engagement in ADLs  Short term goal 5: Demo UB ADLs with SUP and setup  Short term goal 6: Demo LB ADLs with Min A, setup and use of DME PRN       Therapy Time   Individual Concurrent Group Co-treatment   Time In 1221         Time Out 1307         Minutes 46         Timed Code Treatment Minutes: 10 Minutes       Corey Norman, OTR/L

## 2022-01-31 NOTE — PROGRESS NOTES
[facility] -   Infectious diseases evaluation  admission date 1/28/2022    reason for consultation:   covid    Impression :   Current:  · Left hip intertrochanteric fracture after a fall  ·      1/30  S/p Removal of deep orthopedic hardware left femur,                                Cephalomedullary  nail fixation left intertrochanteric femur fracture. · Covid positive, no hypoxemia  · Elevated CRP  · Right lower lobe atelectatic lesions, less likely Covid related  · Underlying COPD  Other:  ·   Discussion / summary of stay / plan of care   ·   Recommendations   · Low  CRP and ferritin  · sotrivimab for asymptomatic Covid in a high risk patient. Received 1/29. · 2 L NC for copd    Infection Control Recommendations   · [place] Precautions  · Contact Isolation   · Airborne isolation  · Droplet Isolation      Antimicrobial Stewardship Recommendations   · Off ab    Coordination ofOutpatient Care:   · Estimated Length of IV antimicrobials:  · Patient will need Midline / picc Catheter Insertion:   · Patient will need SNF:  · Patient will need outpatient wound care:     History of Present Illness:   Initial history:  Ernesto Rodriguez is a 77y.o.-year-old male fell and broke his hip, left intertrochanteric, Ortho on board planning surgical repair. , found to have Covid positive, underlying COPD  Infectious consulted due to Covid positive    Patient on no oxygen  CT scan of the chest reviewed personally, right lower lobe small atelectasis areas, but cannot rule out Covid related-those lesions seem to be isolated in that area, possibly suggestive of atelectasis    Also has A. fib, and has a pacemaker, EF 50 and no valvular disease      1/31   Afebrile. SpO2 100% on 4 L NC. Transfused with 1 unit PRBCs. No SOB. Resting.   Cre increasing, 1.33    Interval changes  1/31/2022   Patient Vitals for the past 8 hrs:   BP Temp Temp src Pulse Resp SpO2   01/31/22 1000 (!) 90/57 97.9 °F (36.6 °C) Oral 70 13 100 %   01/31/22 0945 95/61 97.7 °F (36.5 °C) Oral 70 13 100 %   01/31/22 0900 (!) 89/49 -- -- 70 14 100 %   01/31/22 0845 (!) 88/55 97.9 °F (36.6 °C) Oral 70 14 100 %   01/31/22 0830 (!) 79/52 97.9 °F (36.6 °C) Oral 70 16 95 %   01/31/22 0815 (!) 80/52 97.9 °F (36.6 °C) Oral 70 -- 100 %   01/31/22 0800 (!) 86/53 97.9 °F (36.6 °C) Oral 70 20 92 %   01/31/22 0745 (!) 87/57 -- -- 70 -- 100 %   01/31/22 0456 (!) 75/48 -- -- -- -- --   01/31/22 0451 (!) 86/55 -- -- 70 16 100 %       Summary of relevant labs:  Labs:  W 7-10.7  Creat  0.8-1.33  CRP 15  Ferritin  274  Micro:  covid +  Imaging:  Chest x-ray negative    CT abdomen pelvis and chest  Right lower lobe small atelectasis, cannot rule out early COVID pneumonia        I have personally reviewed the past medical history, past surgical history, medications, social history, and family history, and I haveupdated the database accordingly. Allergies:   Darvocet [propoxyphene n-acetaminophen], Other, Oxycodone-acetaminophen, and Propoxyphene     Review of Systems:     Review of Systems   Constitutional: Positive for activity change. HENT: Negative for congestion. Eyes: Negative for discharge. Respiratory: Negative for apnea. Cardiovascular: Negative for chest pain. Gastrointestinal: Negative for abdominal distention and nausea. Endocrine: Negative for polydipsia. Genitourinary: Negative for dysuria. Musculoskeletal: Positive for arthralgias. Skin: Negative for color change. Allergic/Immunologic: Negative for immunocompromised state. Neurological: Negative for dizziness. Hematological: Negative for adenopathy. Psychiatric/Behavioral: Negative for agitation. Physical Examination :       Physical Exam  Constitutional:       Appearance: Normal appearance. He is not ill-appearing. HENT:      Head: Normocephalic and atraumatic.       Mouth/Throat:      Mouth: Mucous membranes are moist.      Pharynx: Oropharynx is clear. Eyes:      General: No scleral icterus. Conjunctiva/sclera: Conjunctivae normal.   Cardiovascular:      Rate and Rhythm: Normal rate and regular rhythm. Heart sounds: Normal heart sounds. No friction rub. Pulmonary:      Effort: Pulmonary effort is normal. No respiratory distress. Breath sounds: No stridor. No rhonchi. Abdominal:      Palpations: There is no mass. Hernia: No hernia is present. Genitourinary:     Comments: No concepcion. Musculoskeletal:         General: No swelling or deformity. Cervical back: Neck supple. No rigidity. Skin:     General: Skin is dry. Capillary Refill: Capillary refill takes less than 2 seconds. Coloration: Skin is not jaundiced. Neurological:      General: No focal deficit present. Mental Status: He is alert. Mental status is at baseline. Psychiatric:         Mood and Affect: Mood normal.         Thought Content:  Thought content normal.         Past Medical History:     Past Medical History:   Diagnosis Date    Acute superficial gastritis without hemorrhage 05/26/2018    Anastomotic stricture of stomach     Asthma     Atrial fibrillation ([facility]     On Xarelto 6/27/2020    Calculus of gallbladder without cholecystitis without obstruction 05/02/2017    Chronic diastolic heart failure ([address] 11/17/2017    Chronic rhinosinusitis 04/13/2015    Class 2 severe obesity due to excess calories with serious comorbidity and body mass index (BMI) of 36.0 to 36.9 in adult [facility]) 11/17/2017    COPD (chronic obstructive pulmonary disease) ([facility]     E. coli septicemia (HCC)     E. coli UTI     Foot drop, right 07/10/2012    Fracture of femur ([address] 10/23/2016    Gait disorder 07/20/2016    Gastric out let obstruction     GERD (gastroesophageal reflux disease)     Hallux valgus, acquired, bilateral 06/24/2015    Hyperlipidemia     Hypertension     Impaired hearing 04/13/2015    Internal hemorrhoids 01/03/2017    Lymphedema of both lower extremities 06/24/2015    Nausea & vomiting 11/16/2018    OA (osteoarthritis) of knee, bilateral 12/11/2006    Obesity     MARIAMA on CPAP 05/02/2017    Osteoarthritis     Osteoarthritis of lumbar spine 12/13/2007     replace inactive diagnosis    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 coli (HCC)     Due to UTI     Sick sinus syndrome (HCC)     Simple chronic bronchitis (HCC) 04/10/2018    Slow transit constipation 11/03/2016    Unspecified sleep apnea     UTI (urinary tract infection)        Past Surgical  History:     Past Surgical History:   Procedure Laterality Date    CARPAL TUNNEL RELEASE      bilateral    COLONOSCOPY      COLONOSCOPY N/A 04/05/2021    COLONOSCOPY DIAGNOSTIC performed by Elizabeth Sandoval MD at [facility]  01/02/2019    by Dr. Awad Patella N/A 01/02/2019    CYSTOSCOPY performed by Zuleima Yarbrough MD at [address]    first knuckle right index finger   [address]    vertical banded gastroplasty    HEMORRHOID SURGERY      HIP SURGERY Left 01/30/2022     LEFT CEPHALOMEDULLARY NAIL INSERTION    HIP SURGERY Left 01/30/2022    LEFT CEPHALOMEDULLARY NAIL INSERTION (Left Hip)     JOINT REPLACEMENT  2004 & 2005    bilateral knees    KNEE SURGERY Left 01/30/2022    KNEE TOTAL ARTHROPLASTY REVISION, REMOVAL OF INTRAMEDULLARY NAIL     PACEMAKER INSERTION  04/09/2021    [place], placed by Dr. Fareed Guzman EGD [address] N/A 08/16/2018    EGD FOREIGN BODY REMOVAL performed by William Anne MD at [address] EGD TRANSORAL BIOPSY SINGLE/MULTIPLE N/A 05/25/2018    EGD BIOPSY performed by David Bennett DO at [address] Left 01/30/2022    KNEE TOTAL ARTHROPLASTY REVISION, REMOVAL OF INTRAMEDULLARY NAIL (Left     ROTATOR CUFF REPAIR      left shoulder    UPPER GASTROINTESTINAL ENDOSCOPY  08/13/2018    removal of food bolus    UPPER GASTROINTESTINAL ENDOSCOPY N/A 08/13/2018    EGD FOREIGN BODY REMOVAL performed by Savana Chew DO at [place] [address] N/A 08/13/2018    EGD BIOPSY performed by Savana Chew DO at [place] [address]  08/16/2018    egd with balloon dilitation    UPPER GASTROINTESTINAL ENDOSCOPY  08/16/2018    EGD DILATION BALLOON performed by Jennifer Mena MD at [place] [address] N/A 10/01/2018    EGD BIOPSY performed by Michelle Alberto MD at [address] [place] [address] ENDOSCOPY  10/01/2018    EGD DILATION BALLOON performed by Michelle Alberto MD at [address] [place] [address] N/A 11/19/2018    EGD DILATION BALLOON performed by Michelle Alberto MD at [address] [place] [address] N/A 10/15/2020    EGD SUBMUCOSAL/BOTOX INJECTION tattoo  performed by Simon Cosme MD at [address] [place] [address] N/A 7/16/2021    EGD BIOPSY performed by Cornel Bah MD at [address] [place] [address]       Medications:      acetaminophen  1,000 mg Oral 3 times per day    budesonide-formoterol  2 puff Inhalation BID    sodium chloride flush  5-40 mL IntraVENous 2 times per day    polyethylene glycol  17 g Oral Daily    gabapentin  300 mg Oral Q8H    methocarbamol  750 mg Oral Q6H    bumetanide  2 mg Oral Daily    metoprolol succinate  50 mg Oral Nightly    lipase-protease-amylase  24,000 Units Oral TID WC    rOPINIRole  0.25 mg Oral BID    pantoprazole  40 mg Oral QAM AC    tamsulosin  0.4 mg Oral Daily    ferrous sulfate  325 mg Oral Every Other Day       Social History:     Social History     Socioeconomic History    Marital status:      Spouse name: Not on file    Number of children: Not on file    Years of education: Not on file    Highest education level: Not on file   Occupational History    Not on file   Tobacco Use    Smoking status: Former Smoker     Packs/day: 1.00     Years: 20.00     Pack years: 20.00     Quit date: 1976     Years since quittin.1    Smokeless tobacco: Never Used   Vaping Use    Vaping Use: Never used   Substance and Sexual Activity    Alcohol use: No    Drug use: No    Sexual activity: Not on file   Other Topics Concern    Not on file   Social History Narrative    Not on file     Social Determinants of Health     Financial Resource Strain: Low Risk     Difficulty of Paying Living Expenses: Not hard at all   Food Insecurity: No Food Insecurity    Worried About [facility] in the Last Year: Never true    [facility] in the Last Year: Never true   Transportation Needs:     Lack of Transportation (Medical): Not on file    Lack of Transportation (Non-Medical):  Not on file   Physical Activity:     Days of Exercise per Week: Not on file    Minutes of Exercise per Session: Not on file   Stress:     Feeling of Stress : Not on file   Social Connections:     Frequency of Communication with Friends and Family: Not on file    Frequency of Social Gatherings with Friends and Family: Not on file    Attends Yarsanism Services: Not on file    Active Member of [address] [place] [address] or Organizations: Not on file    Attends Club or Organization Meetings: Not on file    Marital Status: Not on file   Intimate Partner Violence:     Fear of Current or Ex-Partner: Not on file    Emotionally Abused: Not on file    Physically Abused: Not on file    Sexually Abused: Not on file   Housing Stability:     Unable to Pay for Housing in the Last Year: Not on file    Number of [place] in the Last Year: Not on file    Unstable Housing in the Last Year: Not on file       Family History:     Family History   Problem Relation Age of Onset    Cancer Mother     Heart Attack Father       Medical Decision Making:   I have independently reviewed/ordered the following labs:    CBC with Differential:   Recent Labs 01/30/22  0600 01/30/22  1436 01/31/22  0002 01/31/22  0555   WBC 6.3  --   --  10.7   HGB 10.4*   < > 7.5* 7.4*   HCT 32.5*   < > 23.5* 22.7*     --   --  176   LYMPHOPCT 14*  --   --  7*   MONOPCT 8  --   --  9    < > = values in this interval not displayed. BMP:  Recent Labs     01/30/22  0600 01/31/22  0555    127*   K 3.7 3.9    95*   CO2 28 24   BUN 13 21   CREATININE 0.73 1.33*     Hepatic Function Panel: No results for input(s): PROT, LABALBU, BILIDIR, IBILI, BILITOT, ALKPHOS, ALT, AST in the last 72 hours. No results for input(s): RPR in the last 72 hours. No results for input(s): HIV in the last 72 hours. No results for input(s): BC in the last 72 hours. Lab Results   Component Value Date    CREATININE 1.33 01/31/2022    GLUCOSE 123 01/31/2022    GLUCOSE 99 09/13/2011       Detailed results: Thank you for allowing us to participate in the care of this patient. Please call with questions. This note is created with the assistance of a speech recognition program.  While intending to generate adocument that actually reflects the content of the visit, the document can still have some errors including those of syntax and sound a like substitutions which may escape proof reading. It such instances, actual meaningcan be extrapolated by contextual diversion.     JULIAN Hamilton - PAVEL  Office: [phone]  Perfect serve / office [phone]

## 2022-01-31 NOTE — DISCHARGE INSTR - COC
Continuity of Care Form    Patient Name: Rebecca Ge   :  1945  MRN:  [id number]    Admit date:  2022  Discharge date: 22      Code Status Order: Full Code   Advance Directives:      Admitting Physician:  Sameera Key MD  PCP: Williams Li APRN - CNP    Discharging Nurse: Татьяна Alan.  [facility] Unit/Room#:   Discharging Unit Phone Number: [phone]    Emergency Contact:   Extended Emergency Contact Information  Primary Emergency Contact: Baisden  Address: [address] [place] [address] Phone: [phone]  Work Phone: [phone]  Mobile Phone: [phone]  Relation: Spouse  Secondary Emergency Contact: India Bustamante  Address: [address] [place] [address] Phone: [phone]  Mobile Phone: [phone]  Relation: Child  Preferred language: English    Past Surgical History:  Past Surgical History:   Procedure Laterality Date    CARPAL TUNNEL RELEASE      bilateral    COLONOSCOPY      COLONOSCOPY N/A 2021    COLONOSCOPY DIAGNOSTIC performed by Adriano Watkins MD at [place] [address]  2019    by Dr. Jamaal Chong N/A 2019    CYSTOSCOPY performed by Maria Luisa Joaquin MD at [address] [place] [address] right index finger    GASTRIC BYPASS SURGERY  1985    vertical banded gastroplasty    HEMORRHOID SURGERY      HIP SURGERY Left 2022     LEFT CEPHALOMEDULLARY NAIL INSERTION    HIP SURGERY Left 2022    LEFT CEPHALOMEDULLARY NAIL INSERTION (Left Hip)     JOINT REPLACEMENT   &     bilateral knees    KNEE SURGERY Left 2022    KNEE TOTAL ARTHROPLASTY REVISION, REMOVAL OF INTRAMEDULLARY NAIL     PACEMAKER INSERTION  2021    [place], placed by Dr. Elpidio Perez EGD [address] [facility] [address]  Ne N/A 2018    EGD FOREIGN BODY REMOVAL performed by Mervin Mccullough MD at [address] EGD TRANSORAL BIOPSY SINGLE/MULTIPLE N/A 05/25/2018    EGD BIOPSY performed by Diana Pena DO at [address] [facility] Left 01/30/2022    KNEE TOTAL ARTHROPLASTY REVISION, REMOVAL OF INTRAMEDULLARY NAIL (Left     ROTATOR CUFF REPAIR      left shoulder    UPPER GASTROINTESTINAL ENDOSCOPY  08/13/2018    removal of food bolus    UPPER GASTROINTESTINAL ENDOSCOPY N/A 08/13/2018    EGD FOREIGN BODY REMOVAL performed by Diana Pena DO at [address] N/A 08/13/2018    EGD BIOPSY performed by Diana Pena DO at [address]  08/16/2018    egd with balloon dilitation    UPPER GASTROINTESTINAL ENDOSCOPY  08/16/2018    EGD DILATION BALLOON performed by Froy Urias MD at [address] 10/01/2018    EGD BIOPSY performed by Alfreda Ocampo MD at [address] [place] [address]  10/01/2018    EGD DILATION BALLOON performed by Alfreda Ocampo MD at [address] [place] [address] N/A 11/19/2018    EGD DILATION BALLOON performed by Alfreda Ocampo MD at [address] [place] [address] N/A 10/15/2020    EGD SUBMUCOSAL/BOTOX INJECTION tattoo  performed by Ingrid Torres MD at [address] [place] [address] N/A 7/16/2021    EGD BIOPSY performed by Castillo Maguire MD at [address] [place] [address] History:   Immunization History   Administered Date(s) Administered    Influenza Vaccine, unspecified formulation 01/02/2015    Influenza Virus Vaccine 01/02/2015    Influenza, Quadv, IM, PF (6 mo and older Fluzone, Flulaval, Fluarix, and 3 yrs and older Afluria) 09/28/2019    Pneumococcal Conjugate 13-valent ([id number]) 08/17/2018    Pneumococcal Polysaccharide ([id number]) 10/31/2019    Tdap (Boostrix, Adacel) 04/22/2019       Active Problems:  Patient Active Problem List   Diagnosis Code    Chronic atrial fibrillation ([facility] I48.20    Dyslipidemia E78.5    Gastroesophageal reflux disease without esophagitis K21.9    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    OA (osteoarthritis) of knee, bilateral M17.10    Foot drop, right M21.371    Hallux valgus, acquired, bilateral M20.11, M20.12    Hearing impairment H91.90    Essential hypertension I10    Slow transit constipation K59.01    MARIAMA on CPAP G47.33, Z99.89    COPD (chronic obstructive pulmonary disease) ([facility]) J44.9    Chronic diastolic heart failure ([facility]) I50.32    History of bariatric surgery including banding leading to esophageal stricture and aspiration Z98.84    BPH (benign prostatic hyperplasia) N40.0    Myalgia M79.10    Atrial fibrillation with slow ventricular response ([facility]) I48.91    Pacemaker pocket hematoma, initial encounter T82.837A    Pulmonary hypertension ([facility]) I27.20    Presence of permanent cardiac pacemaker Z95.0    Fall at home, initial encounter W19. 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     COVID-19 01/29/22 01/29/22 01/29/22 COVID-19, Rapid 02/05/22 02/12/22      Resolved    COVID-19 (Rule Out) 08/10/21 08/10/21 08/10/21 COVID-19 (Ordered)   08/11/21 Rule-Out Test Resulted    COVID-19 (Rule Out) 07/14/21 07/14/21 07/14/21 COVID-19, Rapid (Ordered)   07/14/21 Rule-Out Test Resulted    COVID-19 (Rule Out) 05/01/21 05/01/21 05/01/21 COVID-19 (Ordered)   05/02/21 Rule-Out Test Resulted    COVID-19 (Rule Out) 05/01/21 05/01/21 05/01/21 COVID-19, Rapid (Ordered)   05/01/21 Rule-Out Test Resulted COVID-19 (Rule Out) 04/01/21 04/01/21 04/01/21 COVID-19 (Ordered)   04/02/21 Rule-Out Test Resulted    COVID-19 (Rule Out) 10/11/20 10/11/20 10/11/20 COVID-19, PCR (Ordered)   10/11/20 Rule-Out Test Resulted            Nurse Assessment:  Last Vital Signs: BP 92/62   Pulse 71   Temp 97.7 °F (36.5 °C) (Oral)   Resp 21   Ht 5' 10\" (1.778 m)   Wt 170 lb 14.4 oz (77.5 kg)   SpO2 100%   BMI 24.52 kg/m²     Last documented pain score (0-10 scale): Pain Level: 7  Last Weight:   Wt Readings from Last 1 Encounters:   01/29/22 170 lb 14.4 oz (77.5 kg)     Mental Status:  oriented and alert    IV Access:  - None    Nursing Mobility/ADLs:  Walking   Assisted  Transfer  Assisted  Bathing  Assisted  Dressing  Assisted  Toileting  Assisted  Feeding  Assisted  Med Admin  Assisted  Med Delivery   whole    Wound Care Documentation and Therapy:  Wound 01/29/22 Coccyx Mid stage two pressue ulcer (Active)   Dressing Status Clean;Dry; Intact 01/31/22 0800   Dressing/Treatment Foam 01/31/22 0800   Wound Length (cm) 1.5 cm 01/29/22 0630   Wound Width (cm) 1.5 cm 01/29/22 0630   Wound Surface Area (cm^2) 2.25 cm^2 01/29/22 0630   Wound Assessment Dry;Erythema;Granulation tissue;Pink/red 01/30/22 0400   Drainage Amount None 01/31/22 0800   Odor None 01/31/22 0800   Kelsey-wound Assessment Fragile 01/30/22 0400   Number of days: 2            Elimination:  Continence: Bowel: Sometimes-per pt  Bladder: Sometimes-per pt    Urinary Catheter: None   Colostomy/Ileostomy/Ileal Conduit: No       Date of Last BM: 2/9/22    Intake/Output Summary (Last 24 hours) at 1/31/2022 1451  Last data filed at 1/31/2022 1300  Gross per 24 hour   Intake 200 ml   Output 450 ml   Net -250 ml     I/O last 3 completed shifts:   In: 1098 [P.O.:360; I.V.:738]  Out: 850 [Urine:850]    Safety Concerns:     History of Falls (last 30 days) and At Risk for Falls    Impairments/Disabilities:      None    Nutrition Therapy:  Current Nutrition Therapy:   - Oral Diet:  Easy to chew    Routes of Feeding: Oral  Liquids: No Restrictions  Daily Fluid Restriction: no  Last Modified Barium Swallow with Video (Video Swallowing Test): not done    Treatments at the Time of Hospital Discharge:   Respiratory Treatments: Symbicort, Albuterol, Duoneb  Oxygen Therapy:  is on oxygen at 2 L/min per nasal cannula. Ventilator:    - CPAP   only when sleeping    Rehab Therapies: Physical Therapy and Occupational Therapy  Weight Bearing Status/Restrictions: No weight bearing restirctions  Other Medical Equipment (for information only, NOT a DME order):  cane, walker, and 4 wheel walker, wheel chair all at home  Other Treatments: none    Patient's personal belongings (please select all that are sent with patient):  Glasses, Dentures upper and lower, Watch, clothes, Shoes with leg braces attached, neck brace. RN SIGNATURE:  Electronically signed by Mynor Ohara RN on 2/9/22 at 2:38 PM EST    CASE MANAGEMENT/SOCIAL WORK SECTION    Inpatient Status Date: 1/29/2022    Readmission Risk Assessment Score:  Readmission Risk              Risk of Unplanned Readmission:  25           Discharging to Facility/ Agency   Name:   [facility]) Details  FAX          [address]  [address]       Phone: [phone]       Fax: [phone]        Address:  Phone:  Fax:    Dialysis Facility (if applicable)   Name:  Address:  Dialysis Schedule:  Phone:  Fax:    / signature: Electronically signed by Baljinder Williamson RN on 2/9/22 at 12:09 PM EST Electronically signed by Iman Bryant RN on 2/12/2022 at 7:36 AM     PHYSICIAN SECTION    Prognosis: Good    Condition at Discharge: Stable    Rehab Potential (if transferring to Rehab): Good    Recommended Labs or Other Treatments After Discharge:     2/11/22 - Enzo Agrawal wound vac removed from  lt knee, replaced with soft dressing.     Physician Certification: I certify the above information and transfer of Bert Weeks Lior Samuels  is necessary for the continuing treatment of the diagnosis listed and that he requires Quentin Krause for less 30 days.      Update Admission H&P: No change in H&P    PHYSICIAN SIGNATURE:  Electronically signed by JULIAN Feng CNP on 2//22 at 12:35 PM EST

## 2022-01-31 NOTE — PROGRESS NOTES
[facility]  Progress Note                   Date:   1/31/2022  Patient name: Gabriela Zayas  Date of admission:  1/28/2022 10:07 PM  MRN:   [id number]  YOB: 1945  PCP: JULIAN Mckenna CNP    Reason for Admission: Left displaced femoral neck fracture ([address] [place]  Fall at home, initial encounter [W19. Vivek Plessis, Y92.009]    Subjective:       Clinical Changes /Abnormalities: Discussed with RN. Vitals/labs/monitor reviewed. Currently V-paced on monitor and hypotensive  . RN giving blood . Awake and on NC     Review of Systems    Medications:   Scheduled Meds:   acetaminophen  1,000 mg Oral 3 times per day    budesonide-formoterol  2 puff Inhalation BID    sodium chloride flush  5-40 mL IntraVENous 2 times per day    polyethylene glycol  17 g Oral Daily    gabapentin  300 mg Oral Q8H    methocarbamol  750 mg Oral Q6H    [Held by provider] bumetanide  2 mg Oral Daily    metoprolol succinate  50 mg Oral Nightly    lipase-protease-amylase  24,000 Units Oral TID WC    rOPINIRole  0.25 mg Oral BID    pantoprazole  40 mg Oral QAM AC    tamsulosin  0.4 mg Oral Daily    ferrous sulfate  325 mg Oral Every Other Day     Continuous Infusions:   sodium chloride      sodium chloride       CBC:   Recent Labs     01/29/22  0644 01/29/22  0644 01/30/22  0600 01/30/22  0600 01/30/22  1436 01/31/22  0002 01/31/22  0555   WBC 7.0  --  6.3  --   --   --  10.7   HGB 11.1*   < > 10.4*   < > 9.8* 7.5* 7.4*     --  159  --   --   --  176    < > = values in this interval not displayed. BMP:    Recent Labs     01/30/22  0600 01/31/22  0555 01/31/22  1227    127* 133*   K 3.7 3.9 3.7    95* 98   CO2 28 24 24   BUN 13 21 22   CREATININE 0.73 1.33* 1.21*   GLUCOSE 103* 123* 104*     Hepatic:No results for input(s): AST, ALT, ALB, BILITOT, ALKPHOS in the last 72 hours. Troponin: No results for input(s): TROPHS in the last 72 hours.   BNP: No results for input(s): BNP in the last 72 hours. Lipids: No results for input(s): CHOL, HDL in the last 72 hours. Invalid input(s): LDLCALCU  INR:   Recent Labs     01/28/22  2228   INR 1.5      Echo 1/29/2022  Summary  Left ventricle is normal in size. Global left ventricular systolic function  is difficult to assess due to heart rate and rhythm but appears normal.  Visually estimated EF 50%. Abnormal septal motion; may be due to pacemaker. Mild concentric left ventricular hypertrophy. No significant valvular regurgitation or stenosis seen. EKG: V paced rhythm    Objective:   Vitals: BP 92/62   Pulse 71   Temp 97.9 °F (36.6 °C) (Oral)   Resp 21   Ht 5' 10\" (1.778 m)   Wt 170 lb 14.4 oz (77.5 kg)   SpO2 100%   BMI 24.52 kg/m²   For careful stewardship of limited PPE during COVID-19 pandemic my physical exam was deferred. For physical exam, please see today's physical from primary team or ICU team.     Assessment / Acute Cardiac Problems:   1. Fall and left hip fracture  2. Chronic atrial fibrillation  3. Active Covid infection  4. Sick sinus syndrome status post pacemaker placement  5. Hypertension  6.  Hyperlipidemia      Patient Active Problem List:     Chronic atrial fibrillation (HCC)     Dyslipidemia     Gastroesophageal reflux disease without esophagitis     Osteoarthritis of lumbar spine     OA (osteoarthritis) of knee, bilateral     Foot drop, right     Hallux valgus, acquired, bilateral     Hearing impairment     Essential hypertension     Slow transit constipation     MARIAMA on CPAP     Simple chronic bronchitis (HCC)     Non-intractable vomiting     Hospital-acquired bacterial pneumonia     Pneumonia due to organism     COPD (chronic obstructive pulmonary disease) (HCC)     Chronic diastolic heart failure (HCC)     History of bariatric surgery including banding leading to esophageal stricture and aspiration     BPH (benign prostatic hyperplasia)     Myalgia     Atrial fibrillation with slow ventricular response ([address] Pacemaker pocket hematoma, initial encounter     Pneumonia-community-acquired     Acute hypoxemic respiratory failure (HCC)     SIRS (systemic inflammatory response syndrome) (HCC)     Pulmonary hypertension (HCC)     Presence of permanent cardiac pacemaker     Aspiration pneumonia (HCC)     SBO (small bowel obstruction) ([facility]     Fall at home, initial encounter     Left displaced femoral neck fracture ([facility]     Retained orthopedic hardware     COVID-19     Elevated C-reactive protein (CRP)      Plan of Treatment:   1. Chronic Afib - Vpaced rhythm on monitor . Anticoagulation not  resumed due to acute blood loss /anemia with HGB 7.4. Currently transfusing blood  2. Echo reviewed as above  - EF 50% with no valvular abnormality   3.  Will restart home medication BB once hemodynamically stable     Electronically signed by JULIAN Jin NP on 1/31/2022 at [address]  [phone]

## 2022-01-31 NOTE — CARE COORDINATION
Transitional planning:  Nurse rounds: 28th fall at home. On 01/30/22, had surgery and hardware placed in Left hip. Hgb 7.5, Transfused 1 unit today. Has Pacer.    RN will give pt ARU choice list.

## 2022-01-31 NOTE — PROGRESS NOTES
Physical Therapy    Facility/Department: [facility]  Initial Assessment    NAME: Cleve Mari  : 1945  MRN: [id number]    Date of Service: 2022  Chief Complaint   Patient presents with   Duana Big Fall    Hip Pain      Discharge Recommendations:  Patient would benefit from continued therapy after discharge  Further therapy recommended at discharge. The patient should be able to tolerate at least 3 hours of therapy per day over 5 days or 15 hours over 7 days. This patient may benefit from a Physical Medicine and Rehab consult. PT Equipment Recommendations  Equipment Needed: Yes  Mobility Devices: [place]: Rolling    Assessment   Body structures, Functions, Activity limitations: Decreased functional mobility ; Decreased posture;Decreased endurance;Decreased ROM; Decreased strength;Decreased balance; Increased pain  Assessment: Pt with mobility deficits requiring mod-A x2 to perform sit<>stand transfer and to ambulate 5 feet with a RW. Pt able to tolerate weight bearing through LLE this date but requires physical assistance advancing LLE, maintaining balance, and managing RW during ambulation. Pt would benefit from additional intense PT upon discharge and is expected to be able to tolerate 3 hours of therapy per day 5 days/week. Pt would be unsafe to return to prior living arrangements upon discharge. Prognosis: Good  Decision Making: High Complexity  PT Education: Goals;Transfer Training;PT Role;Functional Mobility Training;General Safety;Weight-bearing Education;Plan of Care;Gait Training  REQUIRES PT FOLLOW UP: Yes  Activity Tolerance  Activity Tolerance: Patient limited by endurance; Patient limited by pain       Patient Diagnosis(es): The encounter diagnosis was Left displaced femoral neck fracture ([address]      has a past medical history of Acute superficial gastritis without hemorrhage, Anastomotic stricture of stomach, Asthma, Atrial fibrillation ([address] Calculus of gallbladder without cholecystitis without obstruction, Chronic diastolic heart failure (HCC), Chronic rhinosinusitis, Class 2 severe obesity due to excess calories with serious comorbidity and body mass index (BMI) of 36.0 to 36.9 in adult [facility]), COPD (chronic obstructive pulmonary disease) ([address] E. coli septicemia ([address] E. coli UTI, Foot drop, right, Fracture of femur ([address] Gait disorder, Gastric out let obstruction, GERD (gastroesophageal reflux disease), Hallux valgus, acquired, bilateral, Hyperlipidemia, Hypertension, Impaired hearing, Internal hemorrhoids, Lymphedema of both lower extremities, Nausea & vomiting, OA (osteoarthritis) of knee, bilateral, Obesity, MARIAMA on CPAP, Osteoarthritis, Osteoarthritis of lumbar spine, S/P revision of total knee, Septicemia due to E. coli ([address] Sick sinus syndrome ([address] Simple chronic bronchitis (HCC), Slow transit constipation, Unspecified sleep apnea, and UTI (urinary tract infection). has a past surgical history that includes Finger amputation (1966); Gastric bypass surgery (1985); Carpal tunnel release; Colonoscopy; Rotator cuff repair; joint replacement (2004 & 2005); Hemorrhoid surgery; pr egd transoral biopsy single/multiple (N/A, 05/25/2018); Upper gastrointestinal endoscopy (08/13/2018); Upper gastrointestinal endoscopy (N/A, 08/13/2018); Upper gastrointestinal endoscopy (N/A, 08/13/2018); Upper gastrointestinal endoscopy (08/16/2018); pr egd flexible foreign body removal (N/A, 08/16/2018); Upper gastrointestinal endoscopy (08/16/2018); Upper gastrointestinal endoscopy (N/A, 10/01/2018); Upper gastrointestinal endoscopy (10/01/2018); Upper gastrointestinal endoscopy (N/A, 11/19/2018); Cystoscopy (01/02/2019); Cystoscopy (N/A, 01/02/2019); Upper gastrointestinal endoscopy (N/A, 10/15/2020); Colonoscopy (N/A, 04/05/2021); Pacemaker insertion (04/09/2021);  Upper gastrointestinal endoscopy (N/A, 7/16/2021); hip surgery (Left, 01/30/2022); knee surgery (Left, 01/30/2022); hip surgery (Left, 01/30/2022); and Revision Total Knee Arthroplasty (Left, 01/30/2022). Restrictions  Restrictions/Precautions  Restrictions/Precautions: Weight Bearing,Isolation (droplet+)  Required Braces or Orthoses?: No  Lower Extremity Weight Bearing Restrictions  Left Lower Extremity Weight Bearing: Weight Bearing As Tolerated  Position Activity Restriction  Other position/activity restrictions: Ace bandage LLE donned prior to therapist arrival, BLE AFOs secondary to chronic drop foot. Vision/Hearing  Vision: Impaired  Vision Exceptions: Wears glasses at all times  Hearing: Exceptions to [facility]  Hearing Exceptions: Bilateral hearing aid     Subjective  General  Patient assessed for rehabilitation services?: Yes  Response To Previous Treatment: Not applicable  Family / Caregiver Present: No  Follows Commands: Within Functional Limits  Subjective  Subjective: Pt supine in bed and agreeable to therapy, RN agreeable to therapy. Pt pleasant and cooperative throughout today's session. Pain Screening  Patient Currently in Pain: Yes  Pain Assessment  Pain Assessment: 0-10  Pain Level: 7  Pain Type: Acute pain  Pain Location: Hip (L hip down to the foot)  Pain Orientation: Left  Pain Descriptors: Stabbing  Functional Pain Assessment: Prevents or interferes some active activities and ADLs  Non-Pharmaceutical Pain Intervention(s): Ambulation/Increased Activity;Repositioned;Distraction; Emotional support  Response to Pain Intervention: Patient Satisfied  Vital Signs  Patient Currently in Pain: Yes       Social/Functional History  Social/Functional History  Lives With: Spouse,Daughter  Type of Home: House  Home Layout: Two level,Able to Live on Main level with bedroom/bathroom  Home Access: Stairs to enter without rails  Entrance Stairs - Number of Steps: 5  Bathroom Shower/Tub: Tub/Shower unit  Bathroom Toilet: Standard  Bathroom Equipment: Tub transfer bench,Grab bars in shower,Grab bars around [employer] Equipment: 4 wheeled walker (uses rollator at a baseline)  ADL Assistance: [address]: [address] [facility] Responsibilities: No (dtr performs majority or cooking, cleaning, laundry)  Ambulation Assistance: Independent  Transfer Assistance: Independent  Active : No  Patient's  Info: family drives or takes a cab  Occupation: Retired  Type of occupation: teaching  Leisure & Hobbies: talking to friends, being social  Additional Comments: Pt reports that his wife is retired and able to assist as needed  Cognition   Cognition  Overall Cognitive Status: WFL    Objective     Observation/Palpation  Posture: Fair    AROM RLE (degrees)  RLE AROM: WFL  RLE General AROM: WFL except for ankle dorsiflexion AROM limited to lacking 20 degrees from neutral  AROM LLE (degrees)  LLE AROM : Exceptions  L Hip Flexion 0-125: 0-80  L Hip ABduction 0-45: 0-20  L Ankle Dorsiflexion 0-20: limited to lacking 20 degrees from neutral  AROM RUE (degrees)  RUE General AROM: Co-eval with OT, see OT note for  UE detail  AROM LUE (degrees)  LUE General AROM: Co-eval with OT, see OT note for  UE detail  Strength RLE  Strength RLE: WFL  Comment: Grossly 4/5  Strength LLE  Comment: NT secondary to increased pain  Strength RUE  Comment: Co-eval with OT, see OT note for  UE detail  Strength LUE  Comment: Co-eval with OT, see OT note for  UE detail     Sensation  Overall Sensation Status: WFL (pt denies any numbness/tingling)  Bed mobility  Supine to Sit: Moderate assistance;2 Person assistance (assistance for trunk progression and LLE progression)  Sit to Supine:  (pt retired up to a chair at the conclusion of today's session.)  Scooting: Moderate assistance  Comment: Bed mobility performed with the [facility] elevated ~50 degrees, (pt reports owning an adjustable hospital bed). Increased time/effort to perform. Transfers  Sit to Stand:  Moderate Assistance;2 Person Assistance  Stand to sit: Moderate Assistance;2 Person Assistance  Comment: transfers performed 2x, Verbal cues for hand placement with good return. Increased effort to perform. Ambulation  Ambulation?: Yes  WB Status: WBAT LLE  More Ambulation?: No  Ambulation 1  Surface: level tile  Device: Rolling Walker  Other Apparatus: AFO; Left;Right  Assistance: Moderate assistance;2 Person assistance (Assistance for LLE progression, RW management, and to prevent LOB.)  Quality of Gait: step-to pattern, unsteady, decreased stride length, fair tolerance to LLE weight bearing. Gait Deviations: Slow Zoie;Decreased step length;Decreased step height  Distance: 5 feet  Stairs/Curb  Stairs?: No     Balance  Posture: Fair  Sitting - Static: Good  Sitting - Dynamic: Good;-  Standing - Static: Fair  Standing - Dynamic: Fair;-  Comments: standing balance assessed while using a RW.  Pt's static/dynamic sitting balance was challenged in unsupported sitting x15 minutes this date, standing balance challenged x 4 minutes this date.         Plan   Plan  Times per week: 6-7  Times per day: Daily  Current Treatment Recommendations: Strengthening,Transfer Training,Endurance Training,ROM,Balance Training,Gait Training,Functional Mobility Training,Stair training,Safety Education & Training,Home Exercise Program,Equipment Evaluation, Education, & procurement,Patient/Caregiver Education & Training  Safety Devices  Type of devices: Left in chair,Call light within reach,Gait belt,Nurse notified,All fall risk precautions in place  Restraints  Initially in place: No                                                 AM-PAC Score  AM-PAC Inpatient Mobility Raw Score : 12 (01/31/22 1402)  AM-PAC Inpatient T-Scale Score : 35.33 (01/31/22 1402)  Mobility Inpatient CMS 0-100% Score: 68.66 (01/31/22 1402)  Mobility Inpatient CMS G-Code Modifier : CL (01/31/22 1402)          Goals  Short term goals  Time Frame for Short term goals: 14 visits  Short term goal 1: Pt will ambulate 125 feet with least restrictive device and CGA to increase functional independence. Short term goal 2: Pt will tolerate a 35 minute therapy session to promote increased endurance. Short term goal 3: Pt will demonstrate good- standing balance to decrease fall risk. Short term goal 4: Pt will negotiate 5 stairs with no handrails and a SPC to allow the pt to enter prior living arrangements. Short term goal 5: Pt will perform sit<>stand transfer with SBA to increase functional independence.        Therapy Time   Individual Concurrent Group Co-treatment   Time In 6399         Time Out 1307         Minutes 46         Timed Code Treatment Minutes: JONO Gonzalez 20, PT

## 2022-01-31 NOTE — PROGRESS NOTES
Orthopedic Progress Note    Patient:  María Lubin  YOB: 1945     77 y.o. male    Subjective:  Patient seen and examined  No complaints or concerns; resting comfortably in bed   Pain controlled  Denies fever, HA, CP, SOB  Endorses light-headedness     Vitals reviewed, afebrile    Objective:   Vitals:    01/31/22 0456   BP: (!) 75/48   Pulse:    Resp:    Temp:    SpO2:      Gen: NAD, cooperative     Cardiovascular: regular rate, no dependent edema    Respiratory: No acute respiratory distress    LLE: Ace-dressing c/d/i. Swelling noted about the surgical site. No actively expanding hematoma noted. Minimal TA/GC motor complex. Foot drop at baseline. Compartments soft. Deep and Superficial Peroneal/Saphenous/Sural SILT. Recent Labs     01/28/22  2228 01/29/22  0644 01/30/22  0600 01/30/22  1436 01/31/22  0002   WBC 6.3   < > 6.3  --   --    HGB 11.3*   < > 10.4*   < > 7.5*   HCT 34.4*   < > 32.5*   < > 23.5*      < > 159  --   --    INR 1.5  --   --   --   --       < > 137  --   --    K 4.0   < > 3.7  --   --    BUN 20   < > 13  --   --    CREATININE 0.90   < > 0.73  --   --    GLUCOSE 92   < > 103*  --   --     < > = values in this interval not displayed.       Meds: epc  See rec for complete list    Impression 77 y.o. male being seen for the following    - Left IT fracture s/p HWR with CMN fixation POD1    Plan    - WBAT LLE  - Hb (7.4); primary planning for 1uprbc transfusion  - Pain control per primary  - Maintain ace-bandage on to RLE  - DVT: ok to restart chemical AC starting POD #1 as deemed appropriate per primary and consulting teams.  - Encourage IS and mobilization  - No further plans for surgery per ortho at this time  - Please page Ortho with questions    Electronically signed by Magdalena Chan DO 6:01 AM 1/31/2022

## 2022-01-31 NOTE — OP NOTE
[address]                  [place] [address]                                OPERATIVE REPORT    PATIENT NAME: Yong Pedraza                    :        1945  MED REC NO:   [id number]                             ROOM:       2006  ACCOUNT NO:   [de-identified]                           ADMIT DATE: 2022  PROVIDER:     Pernell Mojica DO    DATE OF PROCEDURE:  2002    PREOPERATIVE DIAGNOSIS:  Left intertrochanteric femur fracture. POSTOPERATIVE DIAGNOSES:  Left intertrochanteric femur fracture as well  as retained deep orthopedic hardware. PROCEDURES:  Removal of deep orthopedic hardware left femur with  cephalomedullary nail fixation left intertrochanteric femur fracture. SURGEON:  Jenaro Clements DO    ASSISTANTS:  1. Pernell Mojica DO, PGY 5.  [facility], PGY 2.  3.  Cate Rojas DO, PGY-1    ANESTHESIA:  General.    ESTIMATED BLOOD LOSS:  125 mL. FLUIDS:  The patient received 600 mL of crystalloid. IMPLANTS:  Synthes 12 x 400 TFNA. FINDINGS:  Retained deep orthopedic hardware left femur, left  intertrochanteric femur fracture. HISTORY OF PRESENT ILLNESS:  The patient is a 41-year-old male who  presented to [facility] on early morning of  2022, after he sustained a fall from standing height onto his left  hip as he was attempting to get up from a chair. Imaging demonstrated a  intertrochanteric femur fracture on the left with the kathryn-implant  fracture secondary to left femoral retrograde nail. The patient also  had a left total knee arthroplasty. Both of the total knee and the  femur were fixed by Dr. Cary Birch in . Given the need for surgical  stabilization of his left intertrochanteric femur fracture, we discussed  surgical options and recommended removal of his previous retrograde nail  with a revision to antegrade nail for his femur fracture.     DESCRIPTION OF PROCEDURE:  On the day of surgery, the patient was met in  the preoperative area. Again, all risks, benefits, and alternatives  were discussed with the patient including but not limited to infection,  bleeding, blood loss, blood clots, damage to surrounding neurovascular  structures, increased pain, increased swelling, decreased function, need  for further surgery, anesthesia risks, loss of limb, and loss of life. The patient accepted these risks and signed informed consent willingly. The operative site was marked with a surgical marking pen. The patient  was taken from the preoperative area to the operating table where he was  safely transported onto the hospital bed in a supine position. Department of Anesthesia administered general sedation placing an  endotracheal tube without complication. The left lower extremity was  initially prepped and draped in the usual sterile fashion with access  being obtained to the entire left hip. We did perform this first part  of the procedure on a 3080 table. We placed the Bailey knee alexander, our  leg alexander and then used the patient's previous total knee surgical  incision with dissection down to the knee. We then performed arthrotomy  and gained access to the previous total knee incision. We then cleared  out some scar tissue within the notch, medial and lateral gutters as  well as the patellar tendon in order to facilitate access to the  remainder of the knee. We then removed the [employer] clip holding  the polyethylene in place and removed the poly. We were able to gain  access to the distal end of the nail which had been encased on it, which  was removed. We then placed the insertion handle for the removal device  into the distal aspect of the nail. Through the same incision where  able to remove the blade and single screw through the nail.   Once this  was done proximally, we then used fluoroscopic guidance to gain access  to the femur and removed the proximal interlocking screw which was noted  to be in A to P fashion. We then removed the nail in entirety without  any complications. The knee was then irrigated and the polyethylene was  then replaced with a size 12; the new clip was then placed in range of  motion to confirm that the poly was appropriately seated. We then  irrigated the wound again and closed the arthrotomy with interrupted 2-0  Vicryl sutures and a running Stratafix. The skin edges were  approximated with interrupted 0 Vicryl sutures and then the skin edge  was closed with 2-0 Stratafix. Given the thin nature of the patient's  anatomy and lack of subcutaneous tissue, the incision was backed up with  staples. The proximal incision where the proximal screw was removed,  was also closed with staples. A Optifoam dressing was placed over the  total knee incision and the patient was then safely transported to the  [place] table while he remained intubated. We then positioned the patient  appropriately in the supine position on the [place] table appropriately  padding all bony prominences. We then placed the patient in traction  and with reduction maneuvers, with fluoroscopic guidance obtained  reduction to the [place] table. We then made a standard incision and  gained access to the proximal aspect of the femur. Given the fact that  he had a previous distal one third femoral shaft fracture, we decided to  protect his entire femur and placed a 10 x 400 mm nail. This was seated  on the appropriate AP and lateral imaging. We then placed our helical  blade for cephalomedullary fixation and placed one distal interlocking  screw. All wounds were thoroughly irrigated and the proximal wound was  closed with interrupted 0 Vicryl sutures and 2-0 Vicryl sutures and  staples. The remaining two incisions were closed with interrupted 2-0  Vicryl sutures and staples. Sterile dressing was applied.   The patient  was transported from the operating table onto the hospital bed in stable  condition with the Department of Anesthesia removed the endotracheal  tube without complication. The patient was taken to PACU where care was  assumed by the PACU nurse in stable condition. The patient will be made  weightbearing as tolerated in the left lower extremity. He will follow  up with Dr. Rock Gregory in approximate 2 weeks for wound check and staple  removal.  Dr. Rock Gregory was present and active for all portions of the  case. He will receive 24 hours of postop antibiotics.             Vivek Etienne DO    D: 01/30/2022 11:31:42       T: 01/30/2022 11:37:36     [id number]  Job#: [id number]     Doc#: [id number]    CC:  Diana Finch DO

## 2022-01-31 NOTE — OP NOTE
[facility]                                OPERATIVE REPORT    PATIENT NAME: Sung Nair                    :        1945  MED REC NO:   [id number]                             ROOM:       2006  ACCOUNT NO:   [de-identified]                           ADMIT DATE: 2022  PROVIDER:     Kristel Johns DO    CORRECTED FOR DOP (22 jose carlos)    DATE OF PROCEDURE:  2022    PREOPERATIVE DIAGNOSIS:  Left intertrochanteric femur fracture. POSTOPERATIVE DIAGNOSES:  Left intertrochanteric femur fracture as well  as retained deep orthopedic hardware. PROCEDURES:  Removal of deep orthopedic hardware left femur with  cephalomedullary nail fixation left intertrochanteric femur fracture. SURGEON:  Alphonso Lauren DO    ASSISTANTS:  1. Kristel Johns DO, PGY 5.  [address] [place] [address], PGY 2.  3.  Dhara Kramer DO, PGY-1    ANESTHESIA:  General.    ESTIMATED BLOOD LOSS:  125 mL. FLUIDS:  The patient received 600 mL of crystalloid. IMPLANTS:  Synthes 12 x 400 TFNA. Specimens: none    FINDINGS:  Retained deep orthopedic hardware left femur, left  intertrochanteric femur fracture. HISTORY OF PRESENT ILLNESS:  The patient is a 41-year-old male who  presented to [address] [place] [address] on early morning of  2022, after he sustained a fall from standing height onto his left  hip as he was attempting to get up from a chair. Imaging demonstrated a  intertrochanteric femur fracture on the left with the kathryn-implant  fracture secondary to left femoral retrograde nail. The patient also  had a left total knee arthroplasty. Both of the total knee and the  femur were fixed by Dr. Ian Chong in .   Given the need for surgical  stabilization of his left intertrochanteric femur fracture, we discussed  surgical options and recommended removal of his previous retrograde nail  with a revision to antegrade nail for his femur fracture. DESCRIPTION OF PROCEDURE:  On the day of surgery, the patient was met in  the preoperative area. Again, all risks, benefits, and alternatives  were discussed with the patient including but not limited to infection,  bleeding, blood loss, blood clots, damage to surrounding neurovascular  structures, increased pain, increased swelling, decreased function, need  for further surgery, anesthesia risks, loss of limb, and loss of life. The patient accepted these risks and signed informed consent willingly. The operative site was marked with a surgical marking pen. The patient  was taken from the preoperative area to the operating table where he was  safely transported onto the hospital bed in a supine position. Department of Anesthesia administered general sedation placing an  endotracheal tube without complication. The left lower extremity was  initially prepped and draped in the usual sterile fashion with access  being obtained to the entire left hip. We did perform this first part  of the procedure on a 3080 table. We placed the Rico knee alexander, our  leg alexander and then used the patient's previous total knee surgical  incision with dissection down to the knee. We then performed arthrotomy  and gained access to the previous total knee incision. We then cleared  out some scar tissue within the notch, medial and lateral gutters as  well as the patellar tendon in order to facilitate access to the  remainder of the knee. We then removed the [employer] clip holding  the polyethylene in place and removed the poly. We were able to gain  access to the distal end of the nail which had been encased on it, which  was removed. We then placed the insertion handle for the removal device  into the distal aspect of the nail. Through the same incision where  able to remove the blade and single screw through the nail.   Once this  was done proximally, we then used fluoroscopic guidance to gain access  to the femur and removed the proximal interlocking screw which was noted  to be in A to P fashion. We then removed the nail in entirety without  any complications. The knee was then irrigated and the polyethylene was  then replaced with a size 12; the new clip was then placed in range of  motion to confirm that the poly was appropriately seated. We then  irrigated the wound again and closed the arthrotomy with interrupted 2-0  Vicryl sutures and a running Stratafix. The skin edges were  approximated with interrupted 0 Vicryl sutures and then the skin edge  was closed with 2-0 Stratafix. Given the thin nature of the patient's  anatomy and lack of subcutaneous tissue, the incision was backed up with  staples. The proximal incision where the proximal screw was removed,  was also closed with staples. A Optifoam dressing was placed over the  total knee incision and the patient was then safely transported to the  Austin table while he remained intubated. We then positioned the patient  appropriately in the supine position on the Austin table appropriately  padding all bony prominences. We then placed the patient in traction  and with reduction maneuvers, with fluoroscopic guidance obtained  reduction to the Austin table. We then made a standard incision and  gained access to the proximal aspect of the femur. Given the fact that  he had a previous distal one third femoral shaft fracture, we decided to  protect his entire femur and placed a 10 x 400 mm nail. This was seated  on the appropriate AP and lateral imaging. We then placed our helical  blade for cephalomedullary fixation and placed one distal interlocking  screw. All wounds were thoroughly irrigated and the proximal wound was  closed with interrupted 0 Vicryl sutures and 2-0 Vicryl sutures and  staples. The remaining two incisions were closed with interrupted 2-0  Vicryl sutures and staples. Sterile dressing was applied.   The patient  was transported from the operating table onto the hospital bed in stable  condition with the Department of Anesthesia removed the endotracheal  tube without complication. The patient was taken to PACU where care was  assumed by the PACU nurse in stable condition. The patient will be made  weightbearing as tolerated in the left lower extremity. He will follow  up with Dr. Dena Pemberton in approximate 2 weeks for wound check and staple  removal.  Dr. Dena Pemberton was present and active for all portions of the  case. He will receive 24 hours of postop antibiotics.             Florence Martinez DO    D: 01/30/2022 11:31:42       T: 01/30/2022 11:37:36     [id number]  Job#: [id number]     Doc#: [id number]    CC:  Elisa Londono DO

## 2022-02-01 LAB
ABSOLUTE EOS #: 0.42 K/UL (ref 0–0.44)
ABSOLUTE IMMATURE GRANULOCYTE: 0.05 K/UL (ref 0–0.3)
ABSOLUTE LYMPH #: 1 K/UL (ref 1.1–3.7)
ABSOLUTE MONO #: 1.15 K/UL (ref 0.1–1.2)
ANION GAP SERPL CALCULATED.3IONS-SCNC: 10 MMOL/L (ref 9–17)
BASOPHILS # BLD: 0 % (ref 0–2)
BASOPHILS ABSOLUTE: <0.03 K/UL (ref 0–0.2)
BUN BLDV-MCNC: 24 MG/DL (ref 8–23)
BUN/CREAT BLD: ABNORMAL (ref 9–20)
CALCIUM SERPL-MCNC: 8 MG/DL (ref 8.6–10.4)
CHLORIDE BLD-SCNC: 95 MMOL/L (ref 98–107)
CO2: 24 MMOL/L (ref 20–31)
CREAT SERPL-MCNC: 1.12 MG/DL (ref 0.7–1.2)
DIFFERENTIAL TYPE: ABNORMAL
EOSINOPHILS RELATIVE PERCENT: 6 % (ref 1–4)
GFR AFRICAN AMERICAN: >60 ML/MIN
GFR NON-AFRICAN AMERICAN: >60 ML/MIN
GFR SERPL CREATININE-BSD FRML MDRD: ABNORMAL ML/MIN/{1.73_M2}
GFR SERPL CREATININE-BSD FRML MDRD: ABNORMAL ML/MIN/{1.73_M2}
GLUCOSE BLD-MCNC: 89 MG/DL (ref 70–99)
HCT VFR BLD CALC: 22 % (ref 40.7–50.3)
HCT VFR BLD CALC: 24.2 % (ref 40.7–50.3)
HEMOGLOBIN: 7.1 G/DL (ref 13–17)
HEMOGLOBIN: 8.1 G/DL (ref 13–17)
IMMATURE GRANULOCYTES: 1 %
LYMPHOCYTES # BLD: 13 % (ref 24–43)
MAGNESIUM: 1.7 MG/DL (ref 1.6–2.6)
MCH RBC QN AUTO: 31.5 PG (ref 25.2–33.5)
MCHC RBC AUTO-ENTMCNC: 33.5 G/DL (ref 28.4–34.8)
MCV RBC AUTO: 94.2 FL (ref 82.6–102.9)
MONOCYTES # BLD: 15 % (ref 3–12)
NRBC AUTOMATED: 0 PER 100 WBC
PDW BLD-RTO: 13.5 % (ref 11.8–14.4)
PLATELET # BLD: 156 K/UL (ref 138–453)
PLATELET ESTIMATE: ABNORMAL
PMV BLD AUTO: 10.5 FL (ref 8.1–13.5)
POTASSIUM SERPL-SCNC: 3.4 MMOL/L (ref 3.7–5.3)
RBC # BLD: 2.57 M/UL (ref 4.21–5.77)
RBC # BLD: ABNORMAL 10*6/UL
SEG NEUTROPHILS: 65 % (ref 36–65)
SEGMENTED NEUTROPHILS ABSOLUTE COUNT: 5 K/UL (ref 1.5–8.1)
SODIUM BLD-SCNC: 129 MMOL/L (ref 135–144)
WBC # BLD: 7.6 K/UL (ref 3.5–11.3)
WBC # BLD: ABNORMAL 10*3/UL

## 2022-02-01 PROCEDURE — 6370000000 HC RX 637 (ALT 250 FOR IP): Performed by: STUDENT IN AN ORGANIZED HEALTH CARE EDUCATION/TRAINING PROGRAM

## 2022-02-01 PROCEDURE — 36415 COLL VENOUS BLD VENIPUNCTURE: CPT

## 2022-02-01 PROCEDURE — 99232 SBSQ HOSP IP/OBS MODERATE 35: CPT | Performed by: NURSE PRACTITIONER

## 2022-02-01 PROCEDURE — 85014 HEMATOCRIT: CPT

## 2022-02-01 PROCEDURE — 85018 HEMOGLOBIN: CPT

## 2022-02-01 PROCEDURE — 6370000000 HC RX 637 (ALT 250 FOR IP): Performed by: HEALTH CARE PROVIDER

## 2022-02-01 PROCEDURE — 6360000002 HC RX W HCPCS: Performed by: SURGERY

## 2022-02-01 PROCEDURE — 2580000003 HC RX 258: Performed by: STUDENT IN AN ORGANIZED HEALTH CARE EDUCATION/TRAINING PROGRAM

## 2022-02-01 PROCEDURE — 83735 ASSAY OF MAGNESIUM: CPT

## 2022-02-01 PROCEDURE — 1200000000 HC SEMI PRIVATE

## 2022-02-01 PROCEDURE — 85025 COMPLETE CBC W/AUTO DIFF WBC: CPT

## 2022-02-01 PROCEDURE — 6370000000 HC RX 637 (ALT 250 FOR IP): Performed by: SURGERY

## 2022-02-01 PROCEDURE — 80048 BASIC METABOLIC PNL TOTAL CA: CPT

## 2022-02-01 RX ORDER — POTASSIUM CHLORIDE 7.45 MG/ML
10 INJECTION INTRAVENOUS PRN
Status: DISCONTINUED | OUTPATIENT
Start: 2022-02-01 | End: 2022-02-03

## 2022-02-01 RX ORDER — POTASSIUM CHLORIDE 20 MEQ/1
40 TABLET, EXTENDED RELEASE ORAL PRN
Status: DISCONTINUED | OUTPATIENT
Start: 2022-02-01 | End: 2022-02-03

## 2022-02-01 RX ORDER — MAGNESIUM SULFATE 1 G/100ML
1000 INJECTION INTRAVENOUS PRN
Status: DISCONTINUED | OUTPATIENT
Start: 2022-02-01 | End: 2022-02-03

## 2022-02-01 RX ORDER — SODIUM CHLORIDE 1000 MG
1 TABLET, SOLUBLE MISCELLANEOUS
Status: COMPLETED | OUTPATIENT
Start: 2022-02-01 | End: 2022-02-02

## 2022-02-01 RX ORDER — CYCLOBENZAPRINE HCL 5 MG
5 TABLET ORAL ONCE
Status: COMPLETED | OUTPATIENT
Start: 2022-02-01 | End: 2022-02-01

## 2022-02-01 RX ORDER — POTASSIUM CHLORIDE 29.8 MG/ML
20 INJECTION INTRAVENOUS PRN
Status: DISCONTINUED | OUTPATIENT
Start: 2022-02-01 | End: 2022-02-03

## 2022-02-01 RX ADMIN — PANTOPRAZOLE SODIUM 40 MG: 40 TABLET, DELAYED RELEASE ORAL at 05:35

## 2022-02-01 RX ADMIN — GABAPENTIN 300 MG: 300 CAPSULE ORAL at 00:00

## 2022-02-01 RX ADMIN — GABAPENTIN 300 MG: 300 CAPSULE ORAL at 16:59

## 2022-02-01 RX ADMIN — GABAPENTIN 300 MG: 300 CAPSULE ORAL at 20:34

## 2022-02-01 RX ADMIN — PANCRELIPASE 24000 UNITS: 24000; 76000; 120000 CAPSULE, DELAYED RELEASE PELLETS ORAL at 11:31

## 2022-02-01 RX ADMIN — SODIUM CHLORIDE TAB 1 GM 1 G: 1 TAB at 17:32

## 2022-02-01 RX ADMIN — TAMSULOSIN HYDROCHLORIDE 0.4 MG: 0.4 CAPSULE ORAL at 08:25

## 2022-02-01 RX ADMIN — METHOCARBAMOL TABLETS 750 MG: 750 TABLET, COATED ORAL at 17:32

## 2022-02-01 RX ADMIN — ROPINIROLE HYDROCHLORIDE 0.25 MG: 0.25 TABLET, FILM COATED ORAL at 08:24

## 2022-02-01 RX ADMIN — CYCLOBENZAPRINE HYDROCHLORIDE 5 MG: 5 TABLET, FILM COATED ORAL at 09:09

## 2022-02-01 RX ADMIN — POTASSIUM CHLORIDE 40 MEQ: 1500 TABLET, EXTENDED RELEASE ORAL at 11:20

## 2022-02-01 RX ADMIN — BUDESONIDE AND FORMOTEROL FUMARATE DIHYDRATE 2 PUFF: 160; 4.5 AEROSOL RESPIRATORY (INHALATION) at 08:23

## 2022-02-01 RX ADMIN — BUDESONIDE AND FORMOTEROL FUMARATE DIHYDRATE 2 PUFF: 160; 4.5 AEROSOL RESPIRATORY (INHALATION) at 22:57

## 2022-02-01 RX ADMIN — SODIUM CHLORIDE, PRESERVATIVE FREE 10 ML: 5 INJECTION INTRAVENOUS at 08:25

## 2022-02-01 RX ADMIN — ACETAMINOPHEN 1000 MG: 325 TABLET ORAL at 16:58

## 2022-02-01 RX ADMIN — PANCRELIPASE 24000 UNITS: 24000; 76000; 120000 CAPSULE, DELAYED RELEASE PELLETS ORAL at 16:59

## 2022-02-01 RX ADMIN — METHOCARBAMOL TABLETS 750 MG: 750 TABLET, COATED ORAL at 00:00

## 2022-02-01 RX ADMIN — SODIUM CHLORIDE, PRESERVATIVE FREE 10 ML: 5 INJECTION INTRAVENOUS at 20:35

## 2022-02-01 RX ADMIN — METHOCARBAMOL TABLETS 750 MG: 750 TABLET, COATED ORAL at 11:31

## 2022-02-01 RX ADMIN — METHOCARBAMOL TABLETS 750 MG: 750 TABLET, COATED ORAL at 05:35

## 2022-02-01 RX ADMIN — POTASSIUM CHLORIDE 10 MEQ: 7.46 INJECTION, SOLUTION INTRAVENOUS at 11:21

## 2022-02-01 RX ADMIN — ACETAMINOPHEN 1000 MG: 325 TABLET ORAL at 05:34

## 2022-02-01 RX ADMIN — ACETAMINOPHEN 1000 MG: 325 TABLET ORAL at 22:49

## 2022-02-01 RX ADMIN — GABAPENTIN 300 MG: 300 CAPSULE ORAL at 05:35

## 2022-02-01 RX ADMIN — PANCRELIPASE 24000 UNITS: 24000; 76000; 120000 CAPSULE, DELAYED RELEASE PELLETS ORAL at 08:24

## 2022-02-01 RX ADMIN — ROPINIROLE HYDROCHLORIDE 0.25 MG: 0.25 TABLET, FILM COATED ORAL at 20:33

## 2022-02-01 ASSESSMENT — PAIN SCALES - GENERAL
PAINLEVEL_OUTOF10: 0
PAINLEVEL_OUTOF10: 3
PAINLEVEL_OUTOF10: 3
PAINLEVEL_OUTOF10: 5
PAINLEVEL_OUTOF10: 8

## 2022-02-01 ASSESSMENT — ENCOUNTER SYMPTOMS
APNEA: 0
EYE DISCHARGE: 0
ABDOMINAL DISTENTION: 0
COLOR CHANGE: 0
NAUSEA: 0

## 2022-02-01 ASSESSMENT — PAIN DESCRIPTION - LOCATION: LOCATION: LEG

## 2022-02-01 ASSESSMENT — PAIN DESCRIPTION - ORIENTATION: ORIENTATION: LEFT

## 2022-02-01 ASSESSMENT — PAIN DESCRIPTION - PAIN TYPE: TYPE: SURGICAL PAIN

## 2022-02-01 NOTE — PROGRESS NOTES
Progress Note    Patient:  Sinai Vicente  YOB: 1945     77 y.o. male    Subjective:  Patient seen and examined at bedside this morning. No new complaints or concerns per patient this morning. No acute issues overnight per nursing. Pain well-controlled . Denies: fever/chills, HA, CP, SOB, N/V, dysuria, or numbness/tingling in extremities. Has not had a bowel movement but is having flatus. Is urinating since surgery. Went from bed to chair for 4 hours yesterday. Objective:   Vitals:    02/01/22 0305   BP: (!) 87/57   Pulse: 70   Resp: 14   Temp: 98.8 °F (37.1 °C)   SpO2: 100%     Gen: NAD, cooperative  Cardiovascular: Regular rate  Respiratory: no audible wheezing, symmetrical chest expansion   MSK: Left lower extremity: Optifoam dressing over knee, which had saturation on the lower half of the dressing. Optifoam changed and incision inspected, which is without surrounding erythema or evidence of dehiscence. Other soft dressings changed with staples in place, which are without surrounding erythema. Mild tenderness to palpation around hip. Tenderness to palpation along lateral malleolus present. Superficial skin abrasion over anterior shin, which was present at time of presentation. Compartments soft and compressible. EHL/FHL/TA/GSC gross motor intact. Sural, saphenous, superificial/deep peroneal, and plantar nerve distribution SILT. DP pulses 2+ with BCR; foot warm and perfused. Recent Labs     01/31/22  0555 01/31/22  0555 01/31/22  1227 02/01/22  0005   WBC 10.7  --   --   --    HGB 7.4*   < >  --  7.1*   HCT 22.7*   < >  --  22.0*     --   --   --    *   < > 133*  --    K 3.9   < > 3.7  --    BUN 21   < > 22  --    CREATININE 1.33*   < > 1.21*  --    GLUCOSE 123*   < > 104*  --     < > = values in this interval not displayed. Meds: Aspirin, heparin, eliquis, cipro, linezolid    See rec for complete list    Impression: 76y. o. male being seen for

## 2022-02-01 NOTE — PROGRESS NOTES
[facility]  Progress Note                   Date:   2/1/2022  Patient name: Enid Moore  Date of admission:  1/28/2022 10:07 PM  MRN:   [id number]  YOB: 1945  PCP: Kandis Brunner, APRN - CNP    Reason for Admission: Left displaced femoral neck fracture ([address] Jonas Chuck  Fall at home, initial encounter [W19. Des Doshi, Y92.009]    Subjective:       Clinical Changes /Abnormalities: Discussed with RN. Vitals/labs/monitor reviewed. Currently V-paced on monitor and hypotensive  . No cardiac issues over night  Remains hypotensive . HGB 8.1 after blood transfusion yesterday  . Awake and on NC     Review of Systems    Medications:   Scheduled Meds:   acetaminophen  1,000 mg Oral 3 times per day    budesonide-formoterol  2 puff Inhalation BID    sodium chloride flush  5-40 mL IntraVENous 2 times per day    polyethylene glycol  17 g Oral Daily    gabapentin  300 mg Oral Q8H    methocarbamol  750 mg Oral Q6H    [Held by provider] bumetanide  2 mg Oral Daily    [Held by provider] metoprolol succinate  50 mg Oral Nightly    lipase-protease-amylase  24,000 Units Oral TID WC    rOPINIRole  0.25 mg Oral BID    pantoprazole  40 mg Oral QAM AC    tamsulosin  0.4 mg Oral Daily    ferrous sulfate  325 mg Oral Every Other Day     Continuous Infusions:   sodium chloride       CBC:   Recent Labs     01/30/22  0600 01/30/22  1436 01/31/22  0555 02/01/22  0005 02/01/22  0732   WBC 6.3  --  10.7  --  7.6   HGB 10.4*   < > 7.4* 7.1* 8.1*     --  176  --  156    < > = values in this interval not displayed. BMP:    Recent Labs     01/31/22  0555 01/31/22  1227 02/01/22  0732   * 133* 129*   K 3.9 3.7 3.4*   CL 95* 98 95*   CO2 24 24 24   BUN 21 22 24*   CREATININE 1.33* 1.21* 1.12   GLUCOSE 123* 104* 89     Hepatic:No results for input(s): AST, ALT, ALB, BILITOT, ALKPHOS in the last 72 hours. Troponin: No results for input(s): TROPHS in the last 72 hours.   BNP: No results for input(s): BNP in the last 72 hours. Lipids: No results for input(s): CHOL, HDL in the last 72 hours. Invalid input(s): LDLCALCU  INR:   No results for input(s): INR in the last 72 hours. Echo 1/29/2022  Summary  Left ventricle is normal in size. Global left ventricular systolic function  is difficult to assess due to heart rate and rhythm but appears normal.  Visually estimated EF 50%. Abnormal septal motion; may be due to pacemaker. Mild concentric left ventricular hypertrophy. No significant valvular regurgitation or stenosis seen. EKG: V paced rhythm    Objective:   Vitals: BP (!) 87/57   Pulse 70   Temp 98.6 °F (37 °C) (Oral)   Resp 14   Ht 5' 10\" (1.778 m)   Wt 170 lb 14.4 oz (77.5 kg)   SpO2 100%   BMI 24.52 kg/m²   For careful stewardship of limited PPE during COVID-19 pandemic my physical exam was deferred. For physical exam, please see today's physical from primary team or ICU team.     Assessment / Acute Cardiac Problems:   1. Fall and left hip fracture  2. Chronic atrial fibrillation  3. Active Covid infection  4. Sick sinus syndrome status post pacemaker placement  5. Hypertension  6.  Hyperlipidemia      Patient Active Problem List:     Chronic atrial fibrillation (HCC)     Dyslipidemia     Gastroesophageal reflux disease without esophagitis     Osteoarthritis of lumbar spine     OA (osteoarthritis) of knee, bilateral     Foot drop, right     Hallux valgus, acquired, bilateral     Hearing impairment     Essential hypertension     Slow transit constipation     MARIAMA on CPAP     Simple chronic bronchitis (HCC)     Non-intractable vomiting     Hospital-acquired bacterial pneumonia     Pneumonia due to organism     COPD (chronic obstructive pulmonary disease) (HCC)     Chronic diastolic heart failure (HCC)     History of bariatric surgery including banding leading to esophageal stricture and aspiration     BPH (benign prostatic hyperplasia)     Myalgia     Atrial fibrillation with slow ventricular response (HCC)     Pacemaker pocket hematoma, initial encounter     Pneumonia-community-acquired     Acute hypoxemic respiratory failure (HCC)     SIRS (systemic inflammatory response syndrome) (HCC)     Pulmonary hypertension (HCC)     Presence of permanent cardiac pacemaker     Aspiration pneumonia (HCC)     SBO (small bowel obstruction) ([address]     Fall at home, initial encounter     Left displaced femoral neck fracture ([address]     Retained orthopedic hardware     COVID-19     Elevated C-reactive protein (CRP)      Plan of Treatment:   1. Chronic Afib - Vpaced rhythm on monitor . Anticoagulation not  resumed due to acute blood loss /anemia   2. Echo reviewed as above  - EF 50% with no valvular abnormality   3. Recommend to restart home medication BB and blood thinner once hemodynamically stable and anemia resolved   4. Keep K>4, Mg>2  5.  Cardiology is signing off , please call with any concern     Electronically signed by JULIAN Garrison NP on 2/1/2022 at 9:40 [address] [place] [address].  [phone]

## 2022-02-01 NOTE — PROGRESS NOTES
Physician Progress Note      Abran Swan  CSN #:                  [id number]  :                       1945  ADMIT DATE:       2022 10:07 PM  [address] [place] DATE:  RESPONDING  PROVIDER #:        Camden JORDAN - CNP          QUERY TEXT:    Pt admitted with Left IT fracture S/p HWR with CMN fixation. Pt noted to have   Decreased Hgb and given fluid bolus plus transfusion of PRBC. If possible,   please document in the progress notes and discharge summary if you are   evaluating and/or treating any of the following: The medical record reflects the following:  Risk Factors: Left IT fracture  with surgery of HWR with CMN fixation  Clinical Indicators: Hemoglobin 7.4<-10.4<-11.1,   BP 75/48,   Ortho surg   -Endorses light-headedness,   GS note-  Patient had episodes of   hypotension overnight and received a liter bolus of fluid. Receiving 1 unit   PRBCs this morning. Treatment: Fluid bolus, 1 unit PRBC, monitoring    Thank you, Please call if questions  Berenice Rodriguez RN [facility]  [phone]  Options provided:  -- Acute blood loss anemia  -- Postoperative acute blood loss anemia  -- Acute blood loss anemia ruled out  -- Other - I will add my own diagnosis  -- Disagree - Not applicable / Not valid  -- Disagree - Clinically unable to determine / Unknown  -- Refer to Clinical Documentation Reviewer    PROVIDER RESPONSE TEXT:    This patient has acute blood loss anemia.     Query created by: Daniel Shepherd on 2022 1:29 PM      Electronically signed by:  Deepak Covington CNP 2022 9:34 AM

## 2022-02-01 NOTE — PROGRESS NOTES
PROGRESS NOTE      PATIENT NAME: [address] RECORD NO. [id number]  DATE: 2/1/2022  SURGEON: Dr. Chavis Orf: Romero Romero, APRN - CNP    HD: # 3    ASSESSMENT    Patient Active Problem List   Diagnosis    Chronic atrial fibrillation (HCC)    Dyslipidemia    Gastroesophageal reflux disease without esophagitis    Osteoarthritis of lumbar spine    OA (osteoarthritis) of knee, bilateral    Foot drop, right    Hallux valgus, acquired, bilateral    Hearing impairment    Essential hypertension    Slow transit constipation    MARIAMA on CPAP    COPD (chronic obstructive pulmonary disease) (HCC)    Chronic diastolic heart failure (HCC)    History of bariatric surgery including banding leading to esophageal stricture and aspiration    BPH (benign prostatic hyperplasia)    Myalgia    Atrial fibrillation with slow ventricular response (HCC)    Pacemaker pocket hematoma, initial encounter    Pulmonary hypertension ([address]    Presence of permanent cardiac pacemaker    Fall at home, initial encounter    Left displaced femoral neck fracture ([address]    Retained orthopedic hardware    COVID-19    Elevated C-reactive protein (CRP)       MEDICAL DECISION MAKING AND PLAN    Neuro -MMPT -tylenol, gabapentin, robaxin, home requip. More muscle spasm at knee this am, added 1 time flexeril dose. Allergy to oxicodone. CV -BP remains systolic 67P, asymptomatic, holding home bumex and metoprolol. Heme: post op Hg drop w/hematoma, s/p 1 unit prbc on 1/31, Hg 8.1 this am.  Cont to monitor leg for re-bleed. Lovenox held post-op, consider restart tomorrow. Ortho changed saturated dressing this am.    Pulm-encourage incentive spirometry, 2 L nasal cannula for COPD. Renal -voiding spontaneously, monitor creatinine 1.33 postop, now 1.12. Na 129, cont daily labs, encourage po intake.    GI -regular diet  ID -infectious disease on board for Covid received Sotrivimab 1/29  Endo- glucose less than 180  MSK-status post left femur CMN, weightbearing as tolerated left lower extremity  Dispo -PT OT discharge planning. SUBJECTIVE    Beatrice Grad was seen and examined at bedside. Has more muscle spasm in knee this am. Patient denies dizziness shortness of breath nausea vomiting. Voiding spontaneously. I-S 1000      OBJECTIVE  VITALS: Temp: Temp: 98.6 °F (37 °C)Temp  Av.1 °F (36.7 °C)  Min: 97.7 °F (36.5 °C)  Max: 98.8 °F (37.0 °C) BP Systolic (44AKY), KRY:05 , Min:84 , WZD:68   Diastolic (43NXL), AHT:34, Min:56, Max:62   Pulse Pulse  Av.3  Min: 70  Max: 71 Resp Resp  Avg: 15  Min: 12  Max: 21 Pulse ox SpO2  Av %  Min: 100 %  Max: 100 %    CONSTITUTIONAL: awake, alert, cooperative, no apparent distress  HEAD: atraumatic, normocephalic  EYES: sclera clear, pupils equal and reactive to light  ENT: ears are symmetric, nares patent   HEENT: moist mucous membranes  NECK: Supple, symmetrical, trachea midline  LUNGS: no respiratory distress, no audible wheezing, no chest wall tenderness, AICD palpable in left chest  CARDIOVASCULAR: Regular rate and rhythm  ABDOMEN: soft, nontender, nondistended, no guarding, no rebound tenderness   MUSCULOSKELETAL: Limited range of motion of left lower extremity, tender to palpation at the thigh, hematoma at the knee, ecchymosis, dressing with light strikethrough  NEUROLOGIC: Awake, alert, oriented to name, place and time  EXTREMITIES: peripheral pulses normal, no pedal edema, no calf tenderness  SKIN: normal coloration and turgor    I/O last 3 completed shifts: In: 320 [P.O.:320]  Out: 1300 [Urine:1300]    Drain/tube output: In: 380 [P.O.:380]  Out: 1300 [Urine:1300]    LAB:  CBC:   Recent Labs     22  0600 22  1436 22  0555 22  0005 22  0732   WBC 6.3  --  10.7  --  7.6   HGB 10.4*   < > 7.4* 7.1* 8.1*   HCT 32.5*   < > 22.7* 22.0* 24.2*   MCV 97.3  --  96.2  --  94.2     --  176  --  156    < > = values in this interval not displayed.      BMP: Recent Labs     01/31/22  0555 01/31/22  1227 02/01/22  0732   * 133* 129*   K 3.9 3.7 3.4*   CL 95* 98 95*   CO2 24 24 24   BUN 21 22 24*   CREATININE 1.33* 1.21* 1.12   GLUCOSE 123* 104* 89     COAGS:   No results for input(s): APTT, PROT, INR in the last 72 hours. RADIOLOGY:  XR FEMUR LEFT (MIN 2 VIEWS)   Preliminary Result   1. Status post ORIF of an intertrochanteric fracture of the left femur with   intact hardware. 2. Left knee arthroplasty with intact hardware. XR HIP 2-3 VW W PELVIS LEFT   Preliminary Result   1. Status post ORIF of an intertrochanteric fracture of the left femur with   intact hardware. 2. Left knee arthroplasty with intact hardware. FLUORO FOR SURGICAL PROCEDURES   Final Result      XR ANKLE LEFT (MIN 3 VIEWS)   Preliminary Result   1. Anterolateral soft tissue swelling. 2. No acute osseous abnormality of the left ankle. 3. Diffuse osteopenia. XR CHEST PORTABLE   Final Result   No acute cardiopulmonary process. CT CHEST ABDOMEN PELVIS W CONTRAST   Final Result   Intertrochanteric fracture of the proximal left femur. Small pericardial effusion. Uncomplicated descending colon diverticulosis. No acute thoracic, abdominal, or pelvic abnormality. CT LUMBAR SPINE TRAUMA RECONSTRUCTION   Final Result   No acute fracture or malalignment of the lumbar spine. Multilevel degenerative change with bilateral bony foraminal narrowing in the   mid and lower lumbar spine. CT THORACIC SPINE TRAUMA RECONSTRUCTION   Final Result   No acute fracture or malalignment of the thoracic spine. CT HEAD WO CONTRAST   Final Result   No acute intracranial abnormality. CT CERVICAL SPINE WO CONTRAST   Final Result   No acute fracture or malalignment of the cervical spine. Multiple nodules within the right thyroid lobe and correlation with   nonemergent sonography is recommended.          XR SHOULDER LEFT (MIN 2 VIEWS)   Final Result   No acute osseous or soft tissue abnormality. Degenerative change of the left shoulder joint spaces with evidence of prior   rotator cuff repair. XR FEMUR LEFT (MIN 2 VIEWS)   Final Result   Intertrochanteric fracture of the proximal left femur. XR KNEE LEFT (3 VIEWS)   Final Result   No acute osseous or soft tissue abnormality. XR HIP W PELVIS MIN 5 VWS BILATERAL   Final Result   Intertrochanteric fracture of the proximal left femur. Ivette Palm MD  02/01/22, 10:56 AM       Attending Note      I have reviewed the above [facility] note(s) and I either performed the key elements of the medical history and physical exam or was present with the trauma resident when the key elements of the medical history and physical exam were performed. I have discussed the findings, established the care plan and recommendations with the trauma team.    Labs reviewed.   Denice Fong MD  2/1/2022  6:19 PM

## 2022-02-01 NOTE — CONSULTS
Physical Medicine & Rehabilitation  Consult Note - Record Review Only      Admitting Physician:  Hans Riddle MD    Primary Care Provider:  JULIAN Perdomo CNP     Reason for Consult:  Acute Inpatient Rehabilitation    Chief Complaint:  Fall    History of Present Illness:  Referring Provider is requesting an evaluation for appropriate placement upon discharge from acute care. History from chart review. Fred Fortune is a 77 y.o. male with history of atrial fibrillation, diastolic CHF, HTN, HLD, COPD, MARIAMA, and GERD admitted to [facility] on 1/28/2022. He initially presented after a fall from standing height. No LOC. Initial GCS 15.  CT head showed no acute intracranial abnromality. He was found to have an intertrochanteric fracture of the left femur. He underwent removal of deep orthopedic hardware and cephalomedullary nail fixation of the left femur fracture on 1/30/22 (Dr. Mandy Tran). He was also found to be positive for COVID on 1/29/22. He received sotrivimab on 1/29/22. He will be in isolation until 2/7/22 per ID. Review of Systems:  Record Review Only     Premorbid function:  Independent    Current function:    PT:  Restrictions/Precautions: Weight Bearing,Isolation,Fall Risk (COVID-19/droplet-plus)  Other position/activity restrictions: 1/30 L IT fx s/p HWR w/ CMN-fixation; wears BLE AFOs at baseline d/t dropfoot  Left Lower Extremity Weight Bearing: Weight Bearing As Tolerated  Required Braces or Orthoses  Right Lower Extremity Brace: Ankle Foot Orthotics  Left Lower Extremity Brace: Erika Foot Orthotics   Transfers  Sit to Stand: Moderate Assistance,2 Person Assistance  Stand to sit: Moderate Assistance,2 Person Assistance  Comment: transfers performed 2x, Verbal cues for hand placement with good return. Increased effort to perform. WB Status: WBAT LLE  Ambulation 1  Surface: level tile  Device: Rolling Walker  Other Apparatus: AFO,Left,Right  Assistance:  Moderate assistance,2 Person assistance (Assistance for LLE progression, RW management, and to prevent LOB.)  Quality of Gait: step-to pattern, unsteady, decreased stride length, fair tolerance to LLE weight bearing. Gait Deviations: Slow Zoie,Decreased step length,Decreased step height  Distance: 5 feet    Transfers  Sit to Stand: Moderate Assistance,2 Person Assistance  Stand to sit: Moderate Assistance,2 Person Assistance  Comment: transfers performed 2x, Verbal cues for hand placement with good return. Increased effort to perform. Ambulation  Ambulation?: Yes  WB Status: WBAT LLE  More Ambulation?: No  Ambulation 1  Surface: level tile  Device: Rolling Walker  Other Apparatus: AFO,Left,Right  Assistance: Moderate assistance,2 Person assistance (Assistance for LLE progression, RW management, and to prevent LOB.)  Quality of Gait: step-to pattern, unsteady, decreased stride length, fair tolerance to LLE weight bearing. Gait Deviations: Slow Zoie,Decreased step length,Decreased step height  Distance: 5 feet    Surface: level tile  Ambulation 1  Surface: level tile  Device: Rolling Walker  Other Apparatus: AFO,Left,Right  Assistance: Moderate assistance,2 Person assistance (Assistance for LLE progression, RW management, and to prevent LOB.)  Quality of Gait: step-to pattern, unsteady, decreased stride length, fair tolerance to LLE weight bearing. Gait Deviations: Slow Zoie,Decreased step length,Decreased step height  Distance: 5 feet      OT:   ADL  Feeding: Independent  Grooming: Modified independent ,Setup,Increased time to complete  UE Bathing: Stand by assistance,Setup,Verbal cueing,Increased time to complete  LE Bathing:  Moderate assistance,Increased time to complete,Setup,Verbal cueing  UE Dressing: Minimal assistance,Increased time to complete  LE Dressing: Maximum assistance,Increased time to complete,Setup  Toileting: Maximum assistance,Increased time to complete,Setup  Additional Comments: OT facilitated pt  Acute superficial gastritis without hemorrhage 05/26/2018    Anastomotic stricture of stomach     Asthma     Atrial fibrillation ([facility]     On Xarelto 6/27/2020    Calculus of gallbladder without cholecystitis without obstruction 05/02/2017    Chronic diastolic heart failure ([facility] 11/17/2017    Chronic rhinosinusitis 04/13/2015    Class 2 severe obesity due to excess calories with serious comorbidity and body mass index (BMI) of 36.0 to 36.9 in adult [facility]) 11/17/2017    COPD (chronic obstructive pulmonary disease) ([facility]     E. coli septicemia (HCC)     E. coli UTI     Foot drop, right 07/10/2012    Fracture of femur ([facility] 10/23/2016    Gait disorder 07/20/2016    Gastric out let obstruction     GERD (gastroesophageal reflux disease)     Hallux valgus, acquired, bilateral 06/24/2015    Hyperlipidemia     Hypertension     Impaired hearing 04/13/2015    Internal hemorrhoids 01/03/2017    Lymphedema of both lower extremities 06/24/2015    Nausea & vomiting 11/16/2018    OA (osteoarthritis) of knee, bilateral 12/11/2006    Obesity     MARIAMA on CPAP 05/02/2017    Osteoarthritis     Osteoarthritis of lumbar spine 12/13/2007     replace inactive diagnosis    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 coli (HCC)     Due to UTI     Sick sinus syndrome (HCC)     Simple chronic bronchitis ([facility] 04/10/2018    Slow transit constipation 11/03/2016    Unspecified sleep apnea     UTI (urinary tract infection)        Past Surgical History:        Procedure Laterality Date    CARPAL TUNNEL RELEASE      bilateral    COLONOSCOPY      COLONOSCOPY N/A 04/05/2021    COLONOSCOPY DIAGNOSTIC performed by Gurjit Cuenca MD at [facility] [address] [facility] [address]  01/02/2019    by Dr. Gaurang Mcdonnell N/A 01/02/2019    CYSTOSCOPY performed by Renea Walsh MD at [address] Left 1/30/2022    LEFT CEPHALOMEDULLARY NAIL INSERTION performed by Lisa Turner DO at [facility] [address]    first knuckle right index finger    GASTRIC BYPASS SURGERY  1985    vertical banded gastroplasty    HEMORRHOID SURGERY      HIP SURGERY Left 01/30/2022     LEFT CEPHALOMEDULLARY NAIL INSERTION    HIP SURGERY Left 01/30/2022    LEFT CEPHALOMEDULLARY NAIL INSERTION (Left Hip)     JOINT REPLACEMENT  2004 & 2005    bilateral knees    KNEE SURGERY Left 01/30/2022    KNEE TOTAL ARTHROPLASTY REVISION, REMOVAL OF INTRAMEDULLARY NAIL     PACEMAKER INSERTION  04/09/2021    [place], placed by Dr. Adriana Watson EGD [address] N/A 08/16/2018    EGD FOREIGN BODY REMOVAL performed by Livan Allred MD at [address] EGD TRANSORAL BIOPSY SINGLE/MULTIPLE N/A 05/25/2018    EGD BIOPSY performed by Jina De La O DO at [address] Left 01/30/2022    KNEE TOTAL ARTHROPLASTY REVISION, REMOVAL OF INTRAMEDULLARY NAIL (Left     REVISION TOTAL KNEE ARTHROPLASTY Left 1/30/2022    KNEE TOTAL ARTHROPLASTY REVISION, REMOVAL OF INTRAMEDULLARY NAIL performed by Irina Hernandez DO at [address] [facility]      left shoulder    UPPER GASTROINTESTINAL ENDOSCOPY  08/13/2018    removal of food bolus    UPPER GASTROINTESTINAL ENDOSCOPY N/A 08/13/2018    EGD FOREIGN BODY REMOVAL performed by Jina De La O DO at [facility] 08/13/2018    EGD BIOPSY performed by Jina De La O DO at [address]  08/16/2018    egd with balloon dilitation    UPPER GASTROINTESTINAL ENDOSCOPY  08/16/2018    EGD DILATION BALLOON performed by Livan Allred MD at [facility] 10/01/2018    EGD BIOPSY performed by Arun Young MD at [address] [place] [address]  10/01/2018    EGD DILATION BALLOON performed by Arun Young MD at [address] [place] [address] N/A 11/19/2018    EGD DILATION BALLOON performed by Guerrero Blum MD at [facility]    UPPER GASTROINTESTINAL ENDOSCOPY N/A 10/15/2020    EGD SUBMUCOSAL/BOTOX INJECTION tattoo  performed by Azucena Martin MD at [address] [facility] N/A 7/16/2021    EGD BIOPSY performed by Butch Thomas MD at [address] [facility] [address]:     Allergies   Allergen Reactions    Darvocet [Propoxyphene N-Acetaminophen] Hives    Other Hives    Oxycodone-Acetaminophen     Propoxyphene      Other reaction(s): Unknown        Current Medications:   Current Facility-Administered Medications: potassium chloride (KLOR-CON M) extended release tablet 40 mEq, 40 mEq, Oral, PRN **OR** potassium bicarb-citric acid (EFFER-K) effervescent tablet 40 mEq, 40 mEq, Oral, PRN **OR** potassium chloride 10 mEq/100 mL IVPB (Peripheral Line), 10 mEq, IntraVENous, PRN  potassium chloride 10 mEq/100 mL IVPB (Peripheral Line), 10 mEq, IntraVENous, PRN  potassium chloride 20 mEq/50 mL IVPB (Central Line), 20 mEq, IntraVENous, PRN  magnesium sulfate 1000 mg in dextrose 5% 100 mL IVPB, 1,000 mg, IntraVENous, PRN  sodium chloride tablet 1 g, 1 g, Oral, TID WC  acetaminophen (TYLENOL) tablet 1,000 mg, 1,000 mg, Oral, 3 times per day  budesonide-formoterol (SYMBICORT) 160-4.5 MCG/ACT inhaler 2 puff, 2 puff, Inhalation, BID  sodium chloride flush 0.9 % injection 5-40 mL, 5-40 mL, IntraVENous, 2 times per day  sodium chloride flush 0.9 % injection 5-40 mL, 5-40 mL, IntraVENous, PRN  0.9 % sodium chloride infusion, 25 mL, IntraVENous, PRN  ondansetron (ZOFRAN-ODT) disintegrating tablet 4 mg, 4 mg, Oral, Q8H PRN **OR** ondansetron (ZOFRAN) injection 4 mg, 4 mg, IntraVENous, Q6H PRN  polyethylene glycol (GLYCOLAX) packet 17 g, 17 g, Oral, Daily  gabapentin (NEURONTIN) capsule 300 mg, 300 mg, Oral, Q8H  methocarbamol (ROBAXIN) tablet 750 mg, 750 mg, Oral, Q6H  [Held by provider] bumetanide (BUMEX) tablet 2 mg, 2 mg, Oral, Daily  [Held by provider] metoprolol succinate (TOPROL XL) extended release tablet 50 mg, 50 mg, Oral, Nightly  lipase-protease-amylase (CREON) delayed release capsule 24,000 Units, 24,000 Units, Oral, TID WC  rOPINIRole (REQUIP) tablet 0.25 mg, 0.25 mg, Oral, BID  pantoprazole (PROTONIX) tablet 40 mg, 40 mg, Oral, QAM AC  tamsulosin (FLOMAX) capsule 0.4 mg, 0.4 mg, Oral, Daily  albuterol sulfate  (90 Base) MCG/ACT inhaler 2 puff, 2 puff, Inhalation, Q6H PRN  lubrifresh P.M. (artificial tears) ophthalmic ointment, , Both Eyes, PRN  ferrous sulfate (FE TABS 325) EC tablet 325 mg, 325 mg, Oral, Every Other Day  carboxymethylcellulose PF (THERATEARS) 1 % ophthalmic gel 1 drop, 1 drop, Both Eyes, TID PRN  ipratropium-albuterol (DUONEB) nebulizer solution 1 ampule, 1 ampule, Inhalation, Q4H PRN  modafinil (PROVIGIL) tablet 100 mg, 100 mg, Oral, BID PRN    Family History:       Problem Relation Age of Onset    Cancer Mother     Heart Attack Father        Social History:  Lives With: Spouse,Daughter  Type of Home: House  Home Layout: Two level,Able to Live on Main level with bedroom/bathroom  Home Access: Stairs to enter without rails  Entrance Stairs - Number of Steps: 5  Bathroom Shower/Tub: Tub/Shower unit  Bathroom Toilet: Standard  Bathroom Equipment: Tub transfer bench,Grab bars in shower,Grab bars around [employer]: 4 wheeled walker (uses rollator at a baseline)  ADL Assistance: Independent  Homemaking Assistance: Independent  Homemaking Responsibilities: No (dtr performs majority or cooking, cleaning, laundry)  Ambulation Assistance: Independent  Transfer Assistance: Independent  Active : No  Patient's  Info: family drives or takes a cab  Occupation: Retired  Type of occupation: teaching  Leisure & Hobbies: talking to friends, being social  Additional Comments: Pt reports that his wife is retired and able to assist as needed  Social History     Socioeconomic History    Marital status:  Spouse name: Not on file    Number of children: Not on file    Years of education: Not on file    Highest education level: Not on file   Occupational History    Not on file   Tobacco Use    Smoking status: Former Smoker     Packs/day: 1.00     Years: 20.00     Pack years: 20.00     Quit date: 1976     Years since quittin.1    Smokeless tobacco: Never Used   Vaping Use    Vaping Use: Never used   Substance and Sexual Activity    Alcohol use: No    Drug use: No    Sexual activity: Not on file   Other Topics Concern    Not on file   Social History Narrative    Not on file     Social Determinants of Health     Financial Resource Strain: Low Risk     Difficulty of Paying Living Expenses: Not hard at all   Food Insecurity: No Food Insecurity    Worried About [address] in the Last Year: Never true    [address]  [employer] in the Last Year: Never true   Transportation Needs:     Lack of Transportation (Medical): Not on file    Lack of Transportation (Non-Medical):  Not on file   Physical Activity:     Days of Exercise per Week: Not on file    Minutes of Exercise per Session: Not on file   Stress:     Feeling of Stress : Not on file   Social Connections:     Frequency of Communication with Friends and Family: Not on file    Frequency of Social Gatherings with Friends and Family: Not on file    Attends Denominational Services: Not on file    Active Member of [address] [place] [address] or Organizations: Not on file    Attends Club or Organization Meetings: Not on file    Marital Status: Not on file   Intimate Partner Violence:     Fear of Current or Ex-Partner: Not on file    Emotionally Abused: Not on file    Physically Abused: Not on file    Sexually Abused: Not on file   Housing Stability:     Unable to Pay for Housing in the Last Year: Not on file    Number of [place] in the Last Year: Not on file    Unstable Housing in the Last Year: Not on file       Physical Exam:  BP 97/62   Pulse 71 Temp 98.4 °F (36.9 °C) (Oral)   Resp 13   Ht 5' 10\" (1.778 m)   Wt 170 lb 14.4 oz (77.5 kg)   SpO2 100%   BMI 24.52 kg/m²     Deferred - Record Review Only    Diagnostics:    CBC:   Recent Labs     01/30/22  0600 01/30/22  1436 01/31/22  0555 02/01/22  0005 02/01/22  0732   WBC 6.3  --  10.7  --  7.6   RBC 3.34*  --  2.36*  --  2.57*   HGB 10.4*   < > 7.4* 7.1* 8.1*   HCT 32.5*   < > 22.7* 22.0* 24.2*   MCV 97.3  --  96.2  --  94.2   RDW 13.1  --  13.2  --  13.5     --  176  --  156    < > = values in this interval not displayed. BMP:   Recent Labs     01/31/22  0555 01/31/22  1227 02/01/22  0732   * 133* 129*   K 3.9 3.7 3.4*   CL 95* 98 95*   CO2 24 24 24   BUN 21 22 24*   CREATININE 1.33* 1.21* 1.12   GLUCOSE 123* 104* 89      HbA1c: No results found for: LABA1C  BNP: No results for input(s): BNP in the last 72 hours. PT/INR: No results for input(s): PROTIME, INR in the last 72 hours. APTT: No results for input(s): APTT in the last 72 hours. CARDIAC ENZYMES: No results for input(s): CKMB, CKMBINDEX, TROPONINT in the last 72 hours. Invalid input(s): CKTOTAL;3  FASTING LIPID PANEL:  Lab Results   Component Value Date    CHOL 157 07/06/2018    HDL 72 04/01/2021    TRIG 101 07/06/2018     LIVER PROFILE: No results for input(s): AST, ALT, ALB, BILIDIR, BILITOT, ALKPHOS in the last 72 hours. Radiology:  XR FEMUR LEFT (MIN 2 VIEWS)   Preliminary Result   1. Status post ORIF of an intertrochanteric fracture of the left femur with   intact hardware. 2. Left knee arthroplasty with intact hardware. XR HIP 2-3 VW W PELVIS LEFT   Preliminary Result   1. Status post ORIF of an intertrochanteric fracture of the left femur with   intact hardware. 2. Left knee arthroplasty with intact hardware. FLUORO FOR SURGICAL PROCEDURES   Final Result      XR ANKLE LEFT (MIN 3 VIEWS)   Preliminary Result   1. Anterolateral soft tissue swelling.    2. No acute osseous abnormality of the left ankle.   3. Diffuse osteopenia. XR CHEST PORTABLE   Final Result   No acute cardiopulmonary process. CT CHEST ABDOMEN PELVIS W CONTRAST   Final Result   Intertrochanteric fracture of the proximal left femur. Small pericardial effusion. Uncomplicated descending colon diverticulosis. No acute thoracic, abdominal, or pelvic abnormality. CT LUMBAR SPINE TRAUMA RECONSTRUCTION   Final Result   No acute fracture or malalignment of the lumbar spine. Multilevel degenerative change with bilateral bony foraminal narrowing in the   mid and lower lumbar spine. CT THORACIC SPINE TRAUMA RECONSTRUCTION   Final Result   No acute fracture or malalignment of the thoracic spine. CT HEAD WO CONTRAST   Final Result   No acute intracranial abnormality. CT CERVICAL SPINE WO CONTRAST   Final Result   No acute fracture or malalignment of the cervical spine. Multiple nodules within the right thyroid lobe and correlation with   nonemergent sonography is recommended. XR SHOULDER LEFT (MIN 2 VIEWS)   Final Result   No acute osseous or soft tissue abnormality. Degenerative change of the left shoulder joint spaces with evidence of prior   rotator cuff repair. XR FEMUR LEFT (MIN 2 VIEWS)   Final Result   Intertrochanteric fracture of the proximal left femur. XR KNEE LEFT (3 VIEWS)   Final Result   No acute osseous or soft tissue abnormality. XR HIP W PELVIS MIN 5 VWS BILATERAL   Final Result   Intertrochanteric fracture of the proximal left femur. Impression:    1. Left hip fracture s/p cephalomedullary nail fixation on 1/30  2. COVID infection  3. Anemia  4. Hyponatremia  5. Atrial fibrillation  6. Diastolic CHF  7. HTN  8. HLD  9. COPD  10. MARIAMA  11. GERD    Recommendations:    1. Diagnosis:  Left hip fracture  2. Therapy: Has PT/OT needs  3. Medical Necessity: As above  4. Support: Lives with wife and daughter  5.  Rehab Recommendation:  The patient would benefit from acute inpatient rehabilitation once medically stable per primary service. However, he will remain in isolation for COVID until 2/7/22 per ID. Deepti Isidro Acute Rehab admission criteria for COVID patients: must be at least 10 (mild - moderate illness) up to 20 days (severe illness/immune compromise) from symptom onset, must be afebrile for at least 24 hours without antipyretic, must be out of Droplet Plus Isolation as determined by ID/Pulmonology. 6. DVT Prophylaxis: SCDs    It was my pleasure to evaluate Oliverio Fall today. Please call with questions.     Landon Beard MD

## 2022-02-01 NOTE — CARE COORDINATION
Transitional planning-called and talked with wife Adalgisa Craig is wanting [facility]-referral faxed and left message with Cindy. PM&R consult. Second choice is [facility]. O2 3L per N/C. Isolate until 2-7-2022.

## 2022-02-01 NOTE — PROGRESS NOTES
1.33    2/1  Afebrile. SpO2 100% on 2 L NC. Cre improving. Interval changes  2/1/2022   Patient Vitals for the past 8 hrs:   BP Temp Temp src Pulse Resp SpO2   02/01/22 0822 -- 98.6 °F (37 °C) Oral -- -- --   02/01/22 0305 (!) 87/57 98.8 °F (37.1 °C) Oral 70 14 100 %       Summary of relevant labs:  Labs:  W 7-10.7  Creat  0.8-1.33-1.12  CRP 15  Ferritin  274  Micro:  covid +  Imaging:  Chest x-ray negative    CT abdomen pelvis and chest  Right lower lobe small atelectasis, cannot rule out early COVID pneumonia        I have personally reviewed the past medical history, past surgical history, medications, social history, and family history, and I haveupdated the database accordingly. Allergies:   Darvocet [propoxyphene n-acetaminophen], Other, Oxycodone-acetaminophen, and Propoxyphene     Review of Systems:     Review of Systems   Constitutional: Positive for activity change. HENT: Negative for congestion. Eyes: Negative for discharge. Respiratory: Negative for apnea. Cardiovascular: Negative for chest pain. Gastrointestinal: Negative for abdominal distention and nausea. Endocrine: Negative for polydipsia. Genitourinary: Negative for dysuria. Musculoskeletal: Positive for arthralgias. Skin: Negative for color change. Allergic/Immunologic: Negative for immunocompromised state. Neurological: Negative for dizziness. Hematological: Negative for adenopathy. Psychiatric/Behavioral: Negative for agitation. Physical Examination :       Physical Exam  Constitutional:       Appearance: Normal appearance. He is not ill-appearing. HENT:      Head: Normocephalic and atraumatic. Mouth/Throat:      Mouth: Mucous membranes are moist.      Pharynx: Oropharynx is clear. Eyes:      General: No scleral icterus. Conjunctiva/sclera: Conjunctivae normal.   Cardiovascular:      Rate and Rhythm: Normal rate and regular rhythm. Heart sounds: Normal heart sounds. No friction rub. Pulmonary:      Effort: Pulmonary effort is normal. No respiratory distress. Breath sounds: No stridor. No rhonchi. Abdominal:      Palpations: There is no mass. Hernia: No hernia is present. Genitourinary:     Comments: No concepcion. Musculoskeletal:         General: No swelling or deformity. Cervical back: Neck supple. No rigidity. Skin:     General: Skin is dry. Capillary Refill: Capillary refill takes less than 2 seconds. Coloration: Skin is not jaundiced. Neurological:      General: No focal deficit present. Mental Status: He is alert. Mental status is at baseline. Psychiatric:         Mood and Affect: Mood normal.         Thought Content:  Thought content normal.         Past Medical History:     Past Medical History:   Diagnosis Date    Acute superficial gastritis without hemorrhage 05/26/2018    Anastomotic stricture of stomach     Asthma     Atrial fibrillation ([facility]     On Xarelto 6/27/2020    Calculus of gallbladder without cholecystitis without obstruction 05/02/2017    Chronic diastolic heart failure ([facility] 11/17/2017    Chronic rhinosinusitis 04/13/2015    Class 2 severe obesity due to excess calories with serious comorbidity and body mass index (BMI) of 36.0 to 36.9 in adult [facility]) 11/17/2017    COPD (chronic obstructive pulmonary disease) ([facility]     [facility]. [facility] septic[facility]     E. coli UTI     Foot drop, right 07/10/2012    Fracture of femur ([facility] 10/23/2016    Gait disorder 07/20/2016    Gastric out let obstruction     GERD (gastroesophageal reflux disease)     Hallux valgus, acquired, bilateral 06/24/2015    Hyperlipidemia     Hypertension     Impaired hearing 04/13/2015    Internal hemorrhoids 01/03/2017    Lymphedema of both lower extremities 06/24/2015    Nausea & vomiting 11/16/2018    OA (osteoarthritis) of knee, bilateral 12/11/2006    Obesity     MARIAMA on CPAP 05/02/2017    Osteoarthritis     Osteoarthritis of lumbar spine 12/13/2007     replace inactive diagnosis    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 coli ([address]     Due to UTI     Sick sinus syndrome (HCC)     Simple chronic bronchitis (HCC) 04/10/2018    Slow transit constipation 11/03/2016    Unspecified sleep apnea     UTI (urinary tract infection)        Past Surgical  History:     Past Surgical History:   Procedure Laterality Date    CARPAL TUNNEL RELEASE      bilateral    COLONOSCOPY      COLONOSCOPY N/A 04/05/2021    COLONOSCOPY DIAGNOSTIC performed by Kamala Heard MD at [address]  01/02/2019    by Dr. Chelsea Morales N/A 01/02/2019    CYSTOSCOPY performed by Chino Young MD at [address] Left 1/30/2022    LEFT CEPHALOMEDULLARY NAIL INSERTION performed by Helena Alfredo DO at [facility] [address]    first knuckle right index finger   [address]    vertical banded gastroplasty    HEMORRHOID SURGERY      HIP SURGERY Left 01/30/2022     LEFT CEPHALOMEDULLARY NAIL INSERTION    HIP SURGERY Left 01/30/2022    LEFT CEPHALOMEDULLARY NAIL INSERTION (Left Hip)     JOINT REPLACEMENT  2004 & 2005    bilateral knees    KNEE SURGERY Left 01/30/2022    KNEE TOTAL ARTHROPLASTY REVISION, REMOVAL OF INTRAMEDULLARY NAIL     PACEMAKER INSERTION  04/09/2021    [facility], placed by Dr. Heidi Hart EGD [address] [facility] [address] N/A 08/16/2018    EGD FOREIGN BODY REMOVAL performed by Vanna Locke MD at [address] [facility] EGD TRANSORAL BIOPSY SINGLE/MULTIPLE N/A 05/25/2018    EGD BIOPSY performed by Chu Hartley DO at [address] Left 01/30/2022    KNEE TOTAL ARTHROPLASTY REVISION, REMOVAL OF INTRAMEDULLARY NAIL (Left     REVISION TOTAL KNEE ARTHROPLASTY Left 1/30/2022    KNEE TOTAL ARTHROPLASTY REVISION, REMOVAL OF INTRAMEDULLARY NAIL performed by Helena Alfredo DO at [address] [facility] left shoulder    UPPER GASTROINTESTINAL ENDOSCOPY  08/13/2018    removal of food bolus    UPPER GASTROINTESTINAL ENDOSCOPY N/A 08/13/2018    EGD FOREIGN BODY REMOVAL performed by Terence Dolan DO at [address] [place] [address] N/A 08/13/2018    EGD BIOPSY performed by Terence Dolan DO at [address] [place] [address]  08/16/2018    egd with balloon dilitation    UPPER GASTROINTESTINAL ENDOSCOPY  08/16/2018    EGD DILATION BALLOON performed by Leann Voss MD at [address] [place] [address] N/A 10/01/2018    EGD BIOPSY performed by Aki Nichole MD at [address] [place] [address] ENDOSCOPY  10/01/2018    EGD DILATION BALLOON performed by Aki Nichole MD at [address] [place] [address] N/A 11/19/2018    EGD DILATION BALLOON performed by Aki Nichole MD at [address] [place] [address] N/A 10/15/2020    EGD SUBMUCOSAL/BOTOX INJECTION tattoo  performed by Magda Felix MD at [address] [place] [address] N/A 7/16/2021    EGD BIOPSY performed by Mame Rojas MD at [address] [place] [address]       Medications:      acetaminophen  1,000 mg Oral 3 times per day    budesonide-formoterol  2 puff Inhalation BID    sodium chloride flush  5-40 mL IntraVENous 2 times per day    polyethylene glycol  17 g Oral Daily    gabapentin  300 mg Oral Q8H    methocarbamol  750 mg Oral Q6H    [Held by provider] bumetanide  2 mg Oral Daily    [Held by provider] metoprolol succinate  50 mg Oral Nightly    lipase-protease-amylase  24,000 Units Oral TID WC    rOPINIRole  0.25 mg Oral BID    pantoprazole  40 mg Oral QAM AC    tamsulosin  0.4 mg Oral Daily    ferrous sulfate  325 mg Oral Every Other Day       Social History:     Social History     Socioeconomic History    Marital status:      Spouse name: Not on file    Number of children: Not on file    Years of education: Not on file    Highest education level: Not on file   Occupational History    Not on file   Tobacco Use    Smoking status: Former Smoker     Packs/day: 1.00     Years: 20.00     Pack years: 20.00     Quit date: 1976     Years since quittin.1    Smokeless tobacco: Never Used   Vaping Use    Vaping Use: Never used   Substance and Sexual Activity    Alcohol use: No    Drug use: No    Sexual activity: Not on file   Other Topics Concern    Not on file   Social History Narrative    Not on file     Social Determinants of Health     Financial Resource Strain: Low Risk     Difficulty of Paying Living Expenses: Not hard at all   Food Insecurity: No Food Insecurity    Worried About [address] [facility] in the Last Year: Never true    [employer] in the Last Year: Never true   Transportation Needs:     Lack of Transportation (Medical): Not on file    Lack of Transportation (Non-Medical):  Not on file   Physical Activity:     Days of Exercise per Week: Not on file    Minutes of Exercise per Session: Not on file   Stress:     Feeling of Stress : Not on file   Social Connections:     Frequency of Communication with Friends and Family: Not on file    Frequency of Social Gatherings with Friends and Family: Not on file    Attends Judaism Services: Not on file    Active Member of [address] [place] [address] [employer] or Organizations: Not on file    Attends Club or Organization Meetings: Not on file    Marital Status: Not on file   Intimate Partner Violence:     Fear of Current or Ex-Partner: Not on file    Emotionally Abused: Not on file    Physically Abused: Not on file    Sexually Abused: Not on file   Housing Stability:     Unable to Pay for Housing in the Last Year: Not on file    Number of [place] in the Last Year: Not on file    Unstable Housing in the Last Year: Not on file       Family History:     Family History   Problem Relation Age of Onset    Cancer Mother     Heart Attack Father       Medical Decision Making:   I have independently reviewed/ordered the following labs:    CBC with Differential:   Recent Labs     01/31/22  0555 01/31/22  0555 02/01/22  0005 02/01/22  0732   WBC 10.7  --   --  7.6   HGB 7.4*   < > 7.1* 8.1*   HCT 22.7*   < > 22.0* 24.2*     --   --  156   LYMPHOPCT 7*  --   --  13*   MONOPCT 9  --   --  15*    < > = values in this interval not displayed. BMP:  Recent Labs     01/31/22  1227 02/01/22  0732   * 129*   K 3.7 3.4*   CL 98 95*   CO2 24 24   BUN 22 24*   CREATININE 1.21* 1.12     Hepatic Function Panel: No results for input(s): PROT, LABALBU, BILIDIR, IBILI, BILITOT, ALKPHOS, ALT, AST in the last 72 hours. No results for input(s): RPR in the last 72 hours. No results for input(s): HIV in the last 72 hours. No results for input(s): BC in the last 72 hours. Lab Results   Component Value Date    CREATININE 1.12 02/01/2022    GLUCOSE 89 02/01/2022    GLUCOSE 99 09/13/2011       Detailed results: Thank you for allowing us to participate in the care of this patient. Please call with questions. This note is created with the assistance of a speech recognition program.  While intending to generate adocument that actually reflects the content of the visit, the document can still have some errors including those of syntax and sound a like substitutions which may escape proof reading. It such instances, actual meaningcan be extrapolated by contextual diversion.     JULIAN Escobar - CNP  Office: [phone]  Perfect serve / office [phone]

## 2022-02-02 LAB
ABO/RH: NORMAL
ABSOLUTE EOS #: 0.39 K/UL (ref 0–0.44)
ABSOLUTE IMMATURE GRANULOCYTE: 0.05 K/UL (ref 0–0.3)
ABSOLUTE LYMPH #: 0.74 K/UL (ref 1.1–3.7)
ABSOLUTE MONO #: 0.91 K/UL (ref 0.1–1.2)
ANION GAP SERPL CALCULATED.3IONS-SCNC: 11 MMOL/L (ref 9–17)
ANTIBODY SCREEN: NEGATIVE
ARM BAND NUMBER: NORMAL
BASOPHILS # BLD: 1 % (ref 0–2)
BASOPHILS ABSOLUTE: 0.04 K/UL (ref 0–0.2)
BLD PROD TYP BPU: NORMAL
BUN BLDV-MCNC: 22 MG/DL (ref 8–23)
BUN/CREAT BLD: ABNORMAL (ref 9–20)
CALCIUM SERPL-MCNC: 8.4 MG/DL (ref 8.6–10.4)
CHLORIDE BLD-SCNC: 99 MMOL/L (ref 98–107)
CO2: 23 MMOL/L (ref 20–31)
CREAT SERPL-MCNC: 0.8 MG/DL (ref 0.7–1.2)
CROSSMATCH RESULT: NORMAL
DIFFERENTIAL TYPE: ABNORMAL
DISPENSE STATUS BLOOD BANK: NORMAL
EOSINOPHILS RELATIVE PERCENT: 7 % (ref 1–4)
EXPIRATION DATE: NORMAL
GFR AFRICAN AMERICAN: >60 ML/MIN
GFR NON-AFRICAN AMERICAN: >60 ML/MIN
GFR SERPL CREATININE-BSD FRML MDRD: ABNORMAL ML/MIN/{1.73_M2}
GFR SERPL CREATININE-BSD FRML MDRD: ABNORMAL ML/MIN/{1.73_M2}
GLUCOSE BLD-MCNC: 95 MG/DL (ref 70–99)
HCT VFR BLD CALC: 22.3 % (ref 40.7–50.3)
HEMOGLOBIN: 7.2 G/DL (ref 13–17)
IMMATURE GRANULOCYTES: 1 %
LYMPHOCYTES # BLD: 13 % (ref 24–43)
MCH RBC QN AUTO: 30.8 PG (ref 25.2–33.5)
MCHC RBC AUTO-ENTMCNC: 32.3 G/DL (ref 28.4–34.8)
MCV RBC AUTO: 95.3 FL (ref 82.6–102.9)
MONOCYTES # BLD: 16 % (ref 3–12)
NRBC AUTOMATED: 0 PER 100 WBC
PDW BLD-RTO: 13.4 % (ref 11.8–14.4)
PLATELET # BLD: 163 K/UL (ref 138–453)
PLATELET ESTIMATE: ABNORMAL
PMV BLD AUTO: 10.1 FL (ref 8.1–13.5)
POTASSIUM SERPL-SCNC: 4.2 MMOL/L (ref 3.7–5.3)
RBC # BLD: 2.34 M/UL (ref 4.21–5.77)
RBC # BLD: ABNORMAL 10*6/UL
SEG NEUTROPHILS: 64 % (ref 36–65)
SEGMENTED NEUTROPHILS ABSOLUTE COUNT: 3.75 K/UL (ref 1.5–8.1)
SODIUM BLD-SCNC: 133 MMOL/L (ref 135–144)
TRANSFUSION STATUS: NORMAL
UNIT DIVISION: 0
UNIT NUMBER: NORMAL
WBC # BLD: 5.9 K/UL (ref 3.5–11.3)
WBC # BLD: ABNORMAL 10*3/UL

## 2022-02-02 PROCEDURE — 93971 EXTREMITY STUDY: CPT

## 2022-02-02 PROCEDURE — 80048 BASIC METABOLIC PNL TOTAL CA: CPT

## 2022-02-02 PROCEDURE — 2700000000 HC OXYGEN THERAPY PER DAY

## 2022-02-02 PROCEDURE — 99232 SBSQ HOSP IP/OBS MODERATE 35: CPT | Performed by: NURSE PRACTITIONER

## 2022-02-02 PROCEDURE — 6360000002 HC RX W HCPCS: Performed by: STUDENT IN AN ORGANIZED HEALTH CARE EDUCATION/TRAINING PROGRAM

## 2022-02-02 PROCEDURE — 6370000000 HC RX 637 (ALT 250 FOR IP): Performed by: STUDENT IN AN ORGANIZED HEALTH CARE EDUCATION/TRAINING PROGRAM

## 2022-02-02 PROCEDURE — 2580000003 HC RX 258: Performed by: STUDENT IN AN ORGANIZED HEALTH CARE EDUCATION/TRAINING PROGRAM

## 2022-02-02 PROCEDURE — 6360000002 HC RX W HCPCS

## 2022-02-02 PROCEDURE — 94761 N-INVAS EAR/PLS OXIMETRY MLT: CPT

## 2022-02-02 PROCEDURE — 97535 SELF CARE MNGMENT TRAINING: CPT

## 2022-02-02 PROCEDURE — 6370000000 HC RX 637 (ALT 250 FOR IP): Performed by: HEALTH CARE PROVIDER

## 2022-02-02 PROCEDURE — 1200000000 HC SEMI PRIVATE

## 2022-02-02 PROCEDURE — 85025 COMPLETE CBC W/AUTO DIFF WBC: CPT

## 2022-02-02 PROCEDURE — 97530 THERAPEUTIC ACTIVITIES: CPT

## 2022-02-02 RX ORDER — CEPHALEXIN 500 MG/1
500 CAPSULE ORAL 4 TIMES DAILY
Status: DISCONTINUED | OUTPATIENT
Start: 2022-02-02 | End: 2022-02-12 | Stop reason: HOSPADM

## 2022-02-02 RX ORDER — POLYETHYLENE GLYCOL 3350 17 G/17G
17 POWDER, FOR SOLUTION ORAL 2 TIMES DAILY
Status: DISCONTINUED | OUTPATIENT
Start: 2022-02-02 | End: 2022-02-09

## 2022-02-02 RX ORDER — SENNA PLUS 8.6 MG/1
1 TABLET ORAL NIGHTLY
Status: DISCONTINUED | OUTPATIENT
Start: 2022-02-02 | End: 2022-02-09

## 2022-02-02 RX ORDER — DOCUSATE SODIUM 100 MG/1
100 CAPSULE, LIQUID FILLED ORAL DAILY
Status: DISCONTINUED | OUTPATIENT
Start: 2022-02-02 | End: 2022-02-11

## 2022-02-02 RX ADMIN — PANTOPRAZOLE SODIUM 40 MG: 40 TABLET, DELAYED RELEASE ORAL at 08:44

## 2022-02-02 RX ADMIN — ACETAMINOPHEN 1000 MG: 325 TABLET ORAL at 05:46

## 2022-02-02 RX ADMIN — GABAPENTIN 300 MG: 300 CAPSULE ORAL at 05:46

## 2022-02-02 RX ADMIN — Medication 500000 UNITS: at 21:11

## 2022-02-02 RX ADMIN — GABAPENTIN 300 MG: 300 CAPSULE ORAL at 22:04

## 2022-02-02 RX ADMIN — TAMSULOSIN HYDROCHLORIDE 0.4 MG: 0.4 CAPSULE ORAL at 08:44

## 2022-02-02 RX ADMIN — ROPINIROLE HYDROCHLORIDE 0.25 MG: 0.25 TABLET, FILM COATED ORAL at 08:44

## 2022-02-02 RX ADMIN — SODIUM CHLORIDE TAB 1 GM 1 G: 1 TAB at 08:44

## 2022-02-02 RX ADMIN — METHOCARBAMOL TABLETS 750 MG: 750 TABLET, COATED ORAL at 00:44

## 2022-02-02 RX ADMIN — PANCRELIPASE 24000 UNITS: 24000; 76000; 120000 CAPSULE, DELAYED RELEASE PELLETS ORAL at 12:55

## 2022-02-02 RX ADMIN — SODIUM CHLORIDE TAB 1 GM 1 G: 1 TAB at 12:21

## 2022-02-02 RX ADMIN — ENOXAPARIN SODIUM 30 MG: 100 INJECTION SUBCUTANEOUS at 21:57

## 2022-02-02 RX ADMIN — ACETAMINOPHEN 1000 MG: 325 TABLET ORAL at 14:26

## 2022-02-02 RX ADMIN — GABAPENTIN 300 MG: 300 CAPSULE ORAL at 12:55

## 2022-02-02 RX ADMIN — Medication 500000 UNITS: at 18:50

## 2022-02-02 RX ADMIN — POLYETHYLENE GLYCOL 3350 17 G: 17 POWDER, FOR SOLUTION ORAL at 08:45

## 2022-02-02 RX ADMIN — BUDESONIDE AND FORMOTEROL FUMARATE DIHYDRATE 2 PUFF: 160; 4.5 AEROSOL RESPIRATORY (INHALATION) at 08:46

## 2022-02-02 RX ADMIN — CEPHALEXIN 500 MG: 500 CAPSULE ORAL at 21:11

## 2022-02-02 RX ADMIN — CEPHALEXIN 500 MG: 500 CAPSULE ORAL at 17:19

## 2022-02-02 RX ADMIN — ACETAMINOPHEN 1000 MG: 325 TABLET ORAL at 22:04

## 2022-02-02 RX ADMIN — SODIUM CHLORIDE, PRESERVATIVE FREE 10 ML: 5 INJECTION INTRAVENOUS at 21:12

## 2022-02-02 RX ADMIN — METHOCARBAMOL TABLETS 750 MG: 750 TABLET, COATED ORAL at 12:55

## 2022-02-02 RX ADMIN — ROPINIROLE HYDROCHLORIDE 0.25 MG: 0.25 TABLET, FILM COATED ORAL at 21:11

## 2022-02-02 RX ADMIN — SENNOSIDES 8.6 MG: 8.6 TABLET, COATED ORAL at 21:11

## 2022-02-02 RX ADMIN — SODIUM CHLORIDE, PRESERVATIVE FREE 10 ML: 5 INJECTION INTRAVENOUS at 08:45

## 2022-02-02 RX ADMIN — CEPHALEXIN 500 MG: 500 CAPSULE ORAL at 14:27

## 2022-02-02 RX ADMIN — ONDANSETRON 4 MG: 2 INJECTION INTRAMUSCULAR; INTRAVENOUS at 11:06

## 2022-02-02 RX ADMIN — PANCRELIPASE 24000 UNITS: 24000; 76000; 120000 CAPSULE, DELAYED RELEASE PELLETS ORAL at 17:16

## 2022-02-02 RX ADMIN — METHOCARBAMOL TABLETS 750 MG: 750 TABLET, COATED ORAL at 23:17

## 2022-02-02 RX ADMIN — PANCRELIPASE 24000 UNITS: 24000; 76000; 120000 CAPSULE, DELAYED RELEASE PELLETS ORAL at 08:44

## 2022-02-02 RX ADMIN — BUDESONIDE AND FORMOTEROL FUMARATE DIHYDRATE 2 PUFF: 160; 4.5 AEROSOL RESPIRATORY (INHALATION) at 21:10

## 2022-02-02 RX ADMIN — METHOCARBAMOL TABLETS 750 MG: 750 TABLET, COATED ORAL at 05:46

## 2022-02-02 RX ADMIN — METHOCARBAMOL TABLETS 750 MG: 750 TABLET, COATED ORAL at 17:19

## 2022-02-02 RX ADMIN — DOCUSATE SODIUM 100 MG: 100 CAPSULE ORAL at 17:18

## 2022-02-02 RX ADMIN — FERROUS SULFATE TAB EC 325 MG (65 MG FE EQUIVALENT) 325 MG: 325 (65 FE) TABLET DELAYED RESPONSE at 08:45

## 2022-02-02 ASSESSMENT — PAIN DESCRIPTION - LOCATION
LOCATION: LEG
LOCATION: HIP;KNEE
LOCATION: HIP;KNEE
LOCATION: KNEE

## 2022-02-02 ASSESSMENT — ENCOUNTER SYMPTOMS
COLOR CHANGE: 0
COUGH: 0
APNEA: 0
NAUSEA: 0
EYE DISCHARGE: 0
SHORTNESS OF BREATH: 1
ABDOMINAL DISTENTION: 0
VOMITING: 0
DIARRHEA: 0

## 2022-02-02 ASSESSMENT — PAIN DESCRIPTION - PAIN TYPE
TYPE: SURGICAL PAIN;ACUTE PAIN
TYPE: SURGICAL PAIN;ACUTE PAIN
TYPE: SURGICAL PAIN
TYPE: SURGICAL PAIN

## 2022-02-02 ASSESSMENT — PAIN DESCRIPTION - ORIENTATION
ORIENTATION: LEFT

## 2022-02-02 ASSESSMENT — PAIN SCALES - GENERAL
PAINLEVEL_OUTOF10: 7
PAINLEVEL_OUTOF10: 6
PAINLEVEL_OUTOF10: 7
PAINLEVEL_OUTOF10: 7
PAINLEVEL_OUTOF10: 10

## 2022-02-02 ASSESSMENT — PAIN DESCRIPTION - PROGRESSION
CLINICAL_PROGRESSION: NOT CHANGED
CLINICAL_PROGRESSION: NOT CHANGED

## 2022-02-02 ASSESSMENT — PAIN DESCRIPTION - DESCRIPTORS
DESCRIPTORS: ACHING;SORE;DISCOMFORT
DESCRIPTORS: BURNING
DESCRIPTORS: BURNING

## 2022-02-02 ASSESSMENT — PAIN DESCRIPTION - FREQUENCY
FREQUENCY: CONTINUOUS

## 2022-02-02 ASSESSMENT — PAIN DESCRIPTION - ONSET
ONSET: ON-GOING
ONSET: ON-GOING

## 2022-02-02 NOTE — PROGRESS NOTES
Notified Christy Can CM, that per Dr Gab Sanchez pt is approp for ARU, and pt must be out of COVID isolation prior to admit to [facility] ARU. Adalgisa Craig states pt and family are requesting [facility], and writer can initiate precert tomorrow for anticipated admit next week. Adalgisa Craig will discuss with pt and team and notify writer if plan changes.

## 2022-02-02 NOTE — PROGRESS NOTES
Occupational Therapy  Facility/Department: [facility] CAR 2  Daily Treatment Note  NAME: Oliverio Fall  : 1945  MRN: [id number]    Date of Service: 2022    Discharge Recommendations: Pt. Would benefit from 3 hours of therapy per day or 15 hours over a 7 day period to enhance functional outcomes. Patient would benefit from continued therapy after discharge  OT Equipment Recommendations  Walker: Rolling  ADL Assistive Devices: Long-handled Sponge;Long-handled Shoe Horn;Sock-Aid Hard;Transfer Tub Bench    Assessment   Performance deficits / Impairments: Decreased functional mobility ; Decreased ADL status; Decreased strength;Decreased ROM; Decreased endurance;Decreased balance;Decreased high-level IADLs  Prognosis: Good  Decision Making: Medium Complexity  Patient Education: Pt educated on OT role, OT POC, activity promotion, WB status, transfer training, safety awareness- F return  REQUIRES OT FOLLOW UP: Yes  Activity Tolerance  Activity Tolerance: Patient limited by pain; Patient limited by fatigue;Patient Tolerated treatment well  Activity Tolerance: Limited by dizziness  Safety Devices  Safety Devices in place: Yes  Type of devices: Gait belt;Call light within reach;Nurse notified; Left in bed;Bed alarm in place  Restraints  Initially in place: No         Patient Diagnosis(es): The encounter diagnosis was Left displaced femoral neck fracture ([facility]       has a past medical history of Acute superficial gastritis without hemorrhage, Anastomotic stricture of stomach, Asthma, Atrial fibrillation ([address] Calculus of gallbladder without cholecystitis without obstruction, Chronic diastolic heart failure (HCC), Chronic rhinosinusitis, Class 2 severe obesity due to excess calories with serious comorbidity and body mass index (BMI) of 36.0 to 36.9 in adult [facility]), COPD (chronic obstructive pulmonary disease) ([facility] E. coli septicemia ([facility] [address] E. coli UTI, Foot drop, right, Fracture of femur ([facility] Gait disorder, Gastric out let obstruction, GERD (gastroesophageal reflux disease), Hallux valgus, acquired, bilateral, Hyperlipidemia, Hypertension, Impaired hearing, Internal hemorrhoids, Lymphedema of both lower extremities, Nausea & vomiting, OA (osteoarthritis) of knee, bilateral, Obesity, MARIAMA on CPAP, Osteoarthritis, Osteoarthritis of lumbar spine, S/P revision of total knee, Septicemia due to E. coli (HCC), Sick sinus syndrome (HCC), Simple chronic bronchitis (HCC), Slow transit constipation, Unspecified sleep apnea, and UTI (urinary tract infection). has a past surgical history that includes Finger amputation (1966); Gastric bypass surgery (1985); Carpal tunnel release; Colonoscopy; Rotator cuff repair; joint replacement (2004 & 2005); Hemorrhoid surgery; pr egd transoral biopsy single/multiple (N/A, 05/25/2018); Upper gastrointestinal endoscopy (08/13/2018); Upper gastrointestinal endoscopy (N/A, 08/13/2018); Upper gastrointestinal endoscopy (N/A, 08/13/2018); Upper gastrointestinal endoscopy (08/16/2018); pr egd flexible foreign body removal (N/A, 08/16/2018); Upper gastrointestinal endoscopy (08/16/2018); Upper gastrointestinal endoscopy (N/A, 10/01/2018); Upper gastrointestinal endoscopy (10/01/2018); Upper gastrointestinal endoscopy (N/A, 11/19/2018); Cystoscopy (01/02/2019); Cystoscopy (N/A, 01/02/2019); Upper gastrointestinal endoscopy (N/A, 10/15/2020); Colonoscopy (N/A, 04/05/2021); Pacemaker insertion (04/09/2021); Upper gastrointestinal endoscopy (N/A, 7/16/2021); hip surgery (Left, 01/30/2022); knee surgery (Left, 01/30/2022); hip surgery (Left, 01/30/2022); Revision Total Knee Arthroplasty (Left, 01/30/2022); Femur fracture surgery (Left, 1/30/2022); and Revision total knee arthroplasty (Left, 1/30/2022).     Restrictions  Restrictions/Precautions  Restrictions/Precautions: Weight Bearing,Isolation,Fall Risk  Required Braces or Orthoses?: Yes  Lower Extremity Weight Bearing Restrictions  Left Lower Extremity Weight Bearing: Weight Bearing As Tolerated  Required Braces or Orthoses  Right Lower Extremity Brace: Ankle Foot Orthotics  Left Lower Extremity Brace: Erika Foot Orthotics  Position Activity Restriction  Other position/activity restrictions: 1/30 L IT fx s/p HWR w/ CMN-fixation; wears BLE AFOs at baseline d/t dropfoot  Subjective   General  Patient assessed for rehabilitation services?: Yes  Family / Caregiver Present: No  General Comment  Comments: RN ok'd for OT tx this AM. Pt agreeable to session, pleasent/cooperative throughout. Pain Assessment  Pain Level: 7  Pain Type: Surgical pain;Acute pain  Pain Location: Hip;Knee  Pain Orientation: Left  Non-Pharmaceutical Pain Intervention(s): Ambulation/Increased Activity; Distraction; Emotional support  Vital Signs  Patient Currently in Pain: Yes   Orientation  Orientation  Overall Orientation Status: Within Functional Limits  Objective    ADL  Grooming: Setup; Increased time to complete;Contact guard assistance  LE Dressing: Maximum assistance; Increased time to complete;Setup  Additional Comments: Grooming sitting EOB (wash face/oral care) CGA to ensure stability, increased time and effort. LB dress- Severiano/doff B AFO/shoes sitting EOB, max A d/t weakness and pain. Balance  Sitting Balance: Contact guard assistance (CGA sitting EOB while engaged in grooming activity.)  Standing Balance: Maximum assistance  Standing Balance  Time: ~1 minute with 3 sitting rest breaks  Activity: static standing EOB 3 attempts  Comment: Bottom unable to fully come off EOB d/t weakness and pain, max A x 1 while RN changed linen. Attempted 3 times. Bed mobility  Supine to Sit: Moderate assistance;Maximum assistance  Sit to Supine: Maximum assistance;2 Person assistance  Comment: Mod-max A with trunk and B LE. Slow moving d/t pain  Transfers  Sit to stand: Maximum assistance  Stand to sit: Maximum assistance  Transfer Comments: Unable to come to a full stand after 3 attempts with max A  x1. Pt. maintain 1/4 stand ~15-20 sec each.                        Cognition  Overall Cognitive Status: WFL                                         Plan   Plan  Times per week: 4-5x/wk  Current Treatment Recommendations: Safety Education & Training,Self-Care / ADL,Patient/Caregiver Education & Training,Balance Training,Functional Mobility Training,Equipment Evaluation, Education, & procurement,Home Management Training,Endurance Training                                                    AM-PAC Score        AM-PAC Inpatient Daily Activity Raw Score: 17 (02/02/22 1507)  AM-PAC Inpatient ADL T-Scale Score : 37.26 (02/02/22 1507)  ADL Inpatient CMS 0-100% Score: 50.11 (02/02/22 1507)  ADL Inpatient CMS G-Code Modifier : CK (02/02/22 1507)    Goals  Short term goals  Time Frame for Short term goals: By discharge, pt will:  Short term goal 1: Demo bed mobility sit<>supine with Min A  Short term goal 2: Demo functional sit<>stand transfers with Min A and LRD PRN  Short term goal 3: Demo functional mobility with Mod A and LRD PRN  Short term goal 4: Demo +5 minutes of dynamic standing balance with Min A to promote increased engagement in ADLs  Short term goal 5: Demo UB ADLs with SUP and setup  Short term goal 6: Demo LB ADLs with Min A, setup and use of DME PRN       Therapy Time   Individual Concurrent Group Co-treatment   Time In 1426         Time Out 1505         Minutes 39         Timed Code Treatment Minutes: [address] Gal[address]/JERSON

## 2022-02-02 NOTE — PROGRESS NOTES
Progress Note    Patient:  Priya Mesa  YOB: 1945     77 y.o. male    Subjective:  Patient seen and examined at bedside this morning. No new complaints or concerns per patient this morning other than some pain. Does note some new onset shortness of breath overnight. No acute events per nursing. Denies: fever/chills, HA, CP, N/V, dysuria, or numbness/tingling in extremities. Has not had a bowel movement but is having flatus. Is urinating since surgery. Tolerating PO intake but states reduced apatite. Did not work with physical therapy yesterday  Optifoam bandage over the knee had some saturation last night. This was removed and wrapped with Kerlix by trauma team.     Objective:   Vitals:    02/02/22 0341   BP: (!) 104/59   Pulse:    Resp:    Temp:    SpO2:      Gen: NAD, cooperative  Cardiovascular: Regular rate  Respiratory: no audible wheezing, symmetrical chest expansion   MSK: Left lower extremity: Kerlix and ace over knee, c/d/i, which was removed and replaced with opitfoam. Swelling and ecchymoses present surrounding incision. Incision itself well approximated with no active draining. Knee does feel slightly warm to touch. Unable to tolerate more than 15 degrees of flexion at the knee actively/passively. Other soft dressings c/d/i without strike through bleeding. Mild tenderness to palpation around hip. Tenderness to palpation along lateral malleolus present. Mild pain with calf squeeze. Superficial skin abrasion over anterior shin, which was present at time of presentation. Compartments soft and compressible. EHL/FHL/TA/GSC gross motor intact. Sural, saphenous, superificial/deep peroneal, and plantar nerve distribution SILT. DP pulses 2+ with BCR; foot warm and perfused. Recent Labs     02/01/22  0732   WBC 7.6   HGB 8.1*   HCT 24.2*      *   K 3.4*   BUN 24*   CREATININE 1.12   GLUCOSE 89     See rec for complete list    Impression: 76y. o. male who fell from standing height, being seen for:     - Left IT fracture s/p HWR and CMN fixation POD3    Plan:      -Optifoam dressing over knee again replaced. Ok to change tegederm dressings about the hip PRN per nursing. Please notify ortho if optifoam over knee is saturated. -New onset shortness of breath, warmth of LLE, and pain with calf squeezing concerning for DVT, given patient not on chemical anticoagulation. Would recommend further evaluation. Trauma notified of ortho recommendations. -WBAT LLE  -Most recent Hgb 7.2. Continue to monitor.   -Pain control: per primary team discretion   -Ice (20 min, 1 hour off) and elevation for edema/pain control  -Encourage deep breathing and IS  -DVT ppx: EPC. OK for chemical anticoagulation from ortho perspective   -PT/OT to continue to evaluate and treat   -OK to discharge from ortho standpoint and follow up outpatient in 10-14 days   -Please page Ortho with any questions or concerns    -------------------------------------  Ronn Sosa DO  PGY-1, Department of [address] [place] [address] [address], [place], [place]      PGY-2 Addendum    Patient seen and examined. Agree with above. 77 y.o. male being seen after left femur IMN explant, left knee poly exchange, and left CMN placement, POD#3. Patient noting some new shortness of breath not present yesterday although O2 sats at time of ortho eval were 100%. Does continue to complain of pain throughout the hip and knee on the left side. New optifoam placed and incision inspected. No surrounding erythema although some warmth to touch. Pain with calf squeeze present. Inability to range more than 15 degrees actively/passively at the knee. DP/PT pulses 2+. Recommend further evaluation to rule out DVT. Trauma team notified. Would recommend chemical anticoagulation. Notify ortho if optifoam saturated. Do not change optifoam over the knee. Ok to change dressings along the hip PRN per nursing.    PT/OT to continue to work with patient. Please page ortho with questions.       Benny Raygoza DO, PGY-2  Orthopedic Surgery Resident  [facility]

## 2022-02-02 NOTE — PROGRESS NOTES
GI -regular diet  ID -infectious disease on board for Covid received Sotrivimab   Endo- glucose less than 180  MSK-status post left femur CMN, weightbearing as tolerated left lower extremity  Dispo -PT OT discharge planning. Chief Complaint: [facility] with right hip IT fracture s/p right femoral HWR and CMN insertion POD#4    SUBJECTIVE    Arvind Williamson was seen and examined at bedside this morning. No new complaints or concerns per patient this morning other than some pain. States maybe mild SOB. No acute events per nursing. Denies: fever/chills, HA, CP, N/V, dysuria, or numbness/tingling in extremities. Has not had a bowel movement but is having flatus. Is urinating since surgery. Tolerating PO intake but states reduced apatite. Did not work with physical therapy yesterday. Optifoam bandage over the knee had some saturation last night. This was removed and wrapped with Kerlix by trauma team.       OBJECTIVE  VITALS: Temp: Temp: 98.8 °F (37.1 °C)Temp  Av.8 °F (37.1 °C)  Min: 98.4 °F (36.9 °C)  Max: 99.3 °F (06.0 °C) BP Systolic (63QDZ), FGF:71 , Min:81 , SKJ:185   Diastolic (67ULQ), HNI:16, Min:41, Max:64   Pulse Pulse  Av.9  Min: 71  Max: 77 Resp Resp  Av.5  Min: 13  Max: 25 Pulse ox SpO2  Av.3 %  Min: 96 %  Max: 100 %  CONSTITUTIONAL: awake, alert, cooperative, no apparent distress  HEAD: atraumatic, normocephalic  EYES: sclera clear, pupils equal and reactive to light  ENT: ears are symmetric, nares patent   HEENT: moist mucous membranes  NECK: Supple, symmetrical, trachea midline  LUNGS: no respiratory distress, no audible wheezing, no chest wall tenderness, AICD palpable in left chest  CARDIOVASCULAR: Regular rate and rhythm  ABDOMEN: soft, nontender, nondistended, no guarding, no rebound tenderness   MUSCULOSKELETAL: Left lower extremity: Kerlix and ace over knee, c/d/i, which was removed and replaced with opitfoam. Swelling and ecchymoses present surrounding incision.  Incision itself well approximated with no active draining. Unable to tolerate more than 15 degrees of flexion at the knee actively/passively. Other soft dressings c/d/i without strike through bleeding. Appropriate tenderness to palpation around hip. Tenderness to palpation along lateral malleolus present. Mild pain with calf squeeze, possible due to swelling and post op pain. Superficial skin abrasion over anterior shin, which was present at time of presentation. Compartments soft and compressible. EHL/FHL/TA/GSC gross motor intact. Sural, saphenous, superificial/deep peroneal, and plantar nerve distribution SILT. DP pulses 2+ with BCR; foot warm and perfused. NEUROLOGIC: Awake, alert, oriented to name, place and time  EXTREMITIES: peripheral pulses normal, no pedal edema  SKIN: normal coloration and turgor  I/O last 3 completed shifts: In: 60 [P.O.:60]  Out: 1925 [Urine:1925]    Drain/tube output: In: 60 [P.O.:60]  Out: 1300 [Urine:1300]    LAB:  CBC:   Recent Labs     01/31/22  0555 01/31/22  0555 02/01/22  0005 02/01/22  0732 02/02/22  0459   WBC 10.7  --   --  7.6 5.9   HGB 7.4*   < > 7.1* 8.1* 7.2*   HCT 22.7*   < > 22.0* 24.2* 22.3*   MCV 96.2  --   --  94.2 95.3     --   --  156 163    < > = values in this interval not displayed. BMP:   Recent Labs     01/31/22  1227 02/01/22  0732 02/02/22  0459   * 129* 133*   K 3.7 3.4* 4.2   CL 98 95* 99   CO2 24 24 23   BUN 22 24* 22   CREATININE 1.21* 1.12 0.80   GLUCOSE 104* 89 95     COAGS: No results for input(s): APTT, PROT, INR in the last 72 hours. RADIOLOGY:  XR FEMUR LEFT (MIN 2 VIEWS)   Preliminary Result   1. Status post ORIF of an intertrochanteric fracture of the left femur with   intact hardware. 2. Left knee arthroplasty with intact hardware.           XR HIP 2-3 VW W PELVIS LEFT   Preliminary Result   1. Status post ORIF of an intertrochanteric fracture of the left femur with   intact hardware.    2. Left knee arthroplasty with intact hardware.           FLUORO FOR SURGICAL PROCEDURES   Final Result       XR ANKLE LEFT (MIN 3 VIEWS)   Preliminary Result   1. Anterolateral soft tissue swelling. 2. No acute osseous abnormality of the left ankle. 3. Diffuse osteopenia.           XR CHEST PORTABLE   Final Result   No acute cardiopulmonary process.           CT CHEST ABDOMEN PELVIS W CONTRAST   Final Result   Intertrochanteric fracture of the proximal left femur.       Small pericardial effusion.       Uncomplicated descending colon diverticulosis.       No acute thoracic, abdominal, or pelvic abnormality.           CT LUMBAR SPINE TRAUMA RECONSTRUCTION   Final Result   No acute fracture or malalignment of the lumbar spine.       Multilevel degenerative change with bilateral bony foraminal narrowing in the   mid and lower lumbar spine.           CT THORACIC SPINE TRAUMA RECONSTRUCTION   Final Result   No acute fracture or malalignment of the thoracic spine.           CT HEAD WO CONTRAST   Final Result   No acute intracranial abnormality.           CT CERVICAL SPINE WO CONTRAST   Final Result   No acute fracture or malalignment of the cervical spine.       Multiple nodules within the right thyroid lobe and correlation with   nonemergent sonography is recommended.           XR SHOULDER LEFT (MIN 2 VIEWS)   Final Result   No acute osseous or soft tissue abnormality.       Degenerative change of the left shoulder joint spaces with evidence of prior   rotator cuff repair.           XR FEMUR LEFT (MIN 2 VIEWS)   Final Result   Intertrochanteric fracture of the proximal left femur.           XR KNEE LEFT (3 VIEWS)   Final Result   No acute osseous or soft tissue abnormality.           XR HIP W PELVIS MIN 5 VWS BILATERAL   Final Result   Intertrochanteric fracture of the proximal left femur.            Ruby Porter,   2/2/22, 8:03 AM

## 2022-02-02 NOTE — PROGRESS NOTES
[facility] -   Infectious diseases evaluation  admission date 1/28/2022    reason for consultation:   covid    Impression :   Current:  · Left hip intertrochanteric fracture after a fall  ·      1/30  S/p Removal of deep orthopedic hardware left femur,                                Cephalomedullary  nail fixation left intertrochanteric femur fracture. · Covid positive, no hypoxemia  · Elevated CRP  · Right lower lobe atelectatic lesions, less likely Covid related  · Underlying COPD  Other:  ·   Discussion / summary of stay / plan of care   ·   Recommendations   · Low  CRP and ferritin  · sotrivimab for asymptomatic Covid in a high risk patient. Received 1/29. · 2 L NC for copd  · 2/1 O2 at 2 L NC which is baseline. Doing well. Okay to discharge when approved by other services. Isolate until 2/7/22  Infection Control Recommendations   · Sanborn Precautions  · Contact Isolation   · Airborne isolation  · Droplet Isolation      Antimicrobial Stewardship Recommendations   · Off ab    Coordination ofOutpatient Care:   · Estimated Length of IV antimicrobials:  · Patient will need Midline / picc Catheter Insertion:   · Patient will need SNF:  · Patient will need outpatient wound care:     History of Present Illness:   Initial history:  Enid Moore is a 77y.o.-year-old male fell and broke his hip, left intertrochanteric, Ortho on board planning surgical repair. , found to have Covid positive, underlying COPD  Infectious consulted due to Covid positive    Patient on no oxygen  CT scan of the chest reviewed personally, right lower lobe small atelectasis areas, but cannot rule out Covid related-those lesions seem to be isolated in that area, possibly suggestive of atelectasis    Also has A. fib, and has a pacemaker, EF 50 and no valvular disease      1/31   Afebrile. SpO2 100% on 4 L NC. Transfused with 1 unit PRBCs. No SOB. Resting.   Cre increasing, 1.33    2/1  Afebrile. SpO2 100% on 2 L NC. Cre improving. 2/2  Afebrile. SpO2 99% on 3 L NC  Slightly SOB with activity. Pain controlled. Cre improved. Interval changes  2/2/2022   Patient Vitals for the past 8 hrs:   BP Temp Temp src Pulse Resp SpO2   02/02/22 0842 -- -- -- -- 21 99 %   02/02/22 0830 98/76 98.4 °F (36.9 °C) Oral -- -- --   02/02/22 0341 (!) 104/59 -- -- -- -- --   02/02/22 0310 (!) 92/56 98.8 °F (37.1 °C) Oral 75 16 97 %       Summary of relevant labs:  Labs:  W 7-10.7-7.6-5.9  Creat  0.8-1.33-1.12-0.80  CRP 15  Ferritin  274    Micro:  covid +    Imaging:  Chest x-ray negative    CT abdomen pelvis and chest  Right lower lobe small atelectasis, cannot rule out early COVID pneumonia        I have personally reviewed the past medical history, past surgical history, medications, social history, and family history, and I haveupdated the database accordingly. Allergies:   Darvocet [propoxyphene n-acetaminophen], Other, Oxycodone-acetaminophen, and Propoxyphene     Review of Systems:     Review of Systems   Constitutional: Positive for activity change. HENT: Negative for congestion. Eyes: Negative for discharge. Respiratory: Positive for shortness of breath. Negative for apnea and cough. Slightly SOB with activity. Cardiovascular: Negative for chest pain. Gastrointestinal: Negative for abdominal distention, diarrhea, nausea and vomiting. Endocrine: Negative for polydipsia. Genitourinary: Negative for dysuria. Musculoskeletal: Positive for arthralgias. Skin: Negative for color change. Allergic/Immunologic: Negative for immunocompromised state. Neurological: Negative for dizziness. Hematological: Negative for adenopathy. Psychiatric/Behavioral: Negative for agitation. Physical Examination :       Physical Exam  Vitals and nursing note reviewed. Constitutional:       Appearance: Normal appearance. He is not ill-appearing.    HENT:      Head: Normocephalic and atraumatic. Right Ear: External ear normal.      Left Ear: External ear normal.      Nose: Nose normal.      Mouth/Throat:      Mouth: Mucous membranes are moist.      Pharynx: Oropharynx is clear. Eyes:      General: No scleral icterus. Extraocular Movements: Extraocular movements intact. Conjunctiva/sclera: Conjunctivae normal.      Pupils: Pupils are equal, round, and reactive to light. Cardiovascular:      Rate and Rhythm: Normal rate and regular rhythm. Heart sounds: Normal heart sounds. No murmur heard. No friction rub. Pulmonary:      Effort: Pulmonary effort is normal. No respiratory distress. Breath sounds: No stridor. No rhonchi. Comments: Diminished t/o. Abdominal:      General: Bowel sounds are normal. There is no distension. Palpations: Abdomen is soft. There is no mass. Tenderness: There is no abdominal tenderness. Hernia: No hernia is present. Genitourinary:     Comments: No concepcion. Musculoskeletal:         General: No swelling or deformity. Cervical back: Normal range of motion and neck supple. No rigidity. Skin:     General: Skin is warm and dry. Capillary Refill: Capillary refill takes less than 2 seconds. Coloration: Skin is not jaundiced. Neurological:      Mental Status: He is alert and oriented to person, place, and time. Psychiatric:         Mood and Affect: Mood normal.         Behavior: Behavior normal.         Thought Content:  Thought content normal.         Past Medical History:     Past Medical History:   Diagnosis Date    Acute superficial gastritis without hemorrhage 05/26/2018    Anastomotic stricture of stomach     Asthma     Atrial fibrillation ([address]     On Xarelto 6/27/2020    Calculus of gallbladder without cholecystitis without obstruction 05/02/2017    Chronic diastolic heart failure ([address] 11/17/2017    Chronic rhinosinusitis 04/13/2015    Class 2 severe obesity due to excess calories with serious comorbidity and body mass index (BMI) of 36.0 to 36.9 in adult [facility]) 11/17/2017    COPD (chronic obstructive pulmonary disease) (HCC)     E. coli septicemia ([facility]     E. coli UTI     Foot drop, right 07/10/2012    Fracture of femur ([facility] [address] [facility] 10/23/2016    Gait disorder 07/20/2016    Gastric out let obstruction     GERD (gastroesophageal reflux disease)     Hallux valgus, acquired, bilateral 06/24/2015    Hyperlipidemia     Hypertension     Impaired hearing 04/13/2015    Internal hemorrhoids 01/03/2017    Lymphedema of both lower extremities 06/24/2015    Nausea & vomiting 11/16/2018    OA (osteoarthritis) of knee, bilateral 12/11/2006    Obesity     MARIAMA on CPAP 05/02/2017    Osteoarthritis     Osteoarthritis of lumbar spine 12/13/2007     replace inactive diagnosis    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 coli (HCC)     Due to UTI     Sick sinus syndrome (HCC)     Simple chronic bronchitis (HCC) 04/10/2018    Slow transit constipation 11/03/2016    Unspecified sleep apnea     UTI (urinary tract infection)        Past Surgical  History:     Past Surgical History:   Procedure Laterality Date    CARPAL TUNNEL RELEASE      bilateral    COLONOSCOPY      COLONOSCOPY N/A 04/05/2021    COLONOSCOPY DIAGNOSTIC performed by Scherrie Nyhan, MD at [address] [place] [address] Drive  01/02/2019    by Dr. Gita Roger N/A 01/02/2019    CYSTOSCOPY performed by Sayra Clifford MD at [address] Left 1/30/2022    LEFT CEPHALOMEDULLARY NAIL INSERTION performed by Jacqueline Zuñiga DO at [facility] [address]    first knuckle right index finger    GASTRIC BYPASS SURGERY  1985    vertical banded gastroplasty    HEMORRHOID SURGERY      HIP SURGERY Left 01/30/2022     LEFT CEPHALOMEDULLARY NAIL INSERTION    HIP SURGERY Left 01/30/2022    LEFT CEPHALOMEDULLARY NAIL INSERTION (Left Hip)     JOINT REPLACEMENT  2004 & 2005    bilateral knees    KNEE SURGERY Left 01/30/2022    KNEE TOTAL ARTHROPLASTY REVISION, REMOVAL OF INTRAMEDULLARY NAIL     PACEMAKER INSERTION  04/09/2021    [place], placed by Dr. Salamanca Son EGD [id number] [facility] [address] N/A 08/16/2018    EGD FOREIGN BODY REMOVAL performed by Ale Ingram MD at [address] EGD TRANSORAL BIOPSY SINGLE/MULTIPLE N/A 05/25/2018    EGD BIOPSY performed by Kiko Emanuel DO at [address] Left 01/30/2022    KNEE TOTAL ARTHROPLASTY REVISION, REMOVAL OF INTRAMEDULLARY NAIL (Left     REVISION TOTAL KNEE ARTHROPLASTY Left 1/30/2022    KNEE TOTAL ARTHROPLASTY REVISION, REMOVAL OF INTRAMEDULLARY NAIL performed by Jessee Leach DO at [address] [place] [address]      left shoulder    UPPER GASTROINTESTINAL ENDOSCOPY  08/13/2018    removal of food bolus    UPPER GASTROINTESTINAL ENDOSCOPY N/A 08/13/2018    EGD FOREIGN BODY REMOVAL performed by Kiko Emanuel DO at [place] [address] N/A 08/13/2018    EGD BIOPSY performed by Kiko Emanuel DO at [place] [address]  08/16/2018    egd with balloon dilitation    UPPER GASTROINTESTINAL ENDOSCOPY  08/16/2018    EGD DILATION BALLOON performed by Ale Ingram MD at [place] [address] 10/01/2018    EGD BIOPSY performed by Tereza Diez MD at [address] [place] [address]  10/01/2018    EGD DILATION BALLOON performed by Tereza Diez MD at [address] [place] [address] 11/19/2018    EGD DILATION BALLOON performed by Tereza Diez MD at [address] [place] [address] 10/15/2020    EGD SUBMUCOSAL/BOTOX INJECTION tattoo  performed by Irlanda Parker MD at [address] [place] [address] N/A 7/16/2021    EGD BIOPSY performed by Iveth Vo MD at [address] [place] [address]       Medications:      docusate sodium  100 mg Oral Daily    sodium chloride  1 g Oral TID WC    acetaminophen  1,000 mg Oral 3 times per day    budesonide-formoterol  2 puff Inhalation BID    sodium chloride flush  5-40 mL IntraVENous 2 times per day    polyethylene glycol  17 g Oral Daily    gabapentin  300 mg Oral Q8H    methocarbamol  750 mg Oral Q6H    [Held by provider] bumetanide  2 mg Oral Daily    [Held by provider] metoprolol succinate  50 mg Oral Nightly    lipase-protease-amylase  24,000 Units Oral TID WC    rOPINIRole  0.25 mg Oral BID    pantoprazole  40 mg Oral QAM AC    tamsulosin  0.4 mg Oral Daily    ferrous sulfate  325 mg Oral Every Other Day       Social History:     Social History     Socioeconomic History    Marital status:      Spouse name: Not on file    Number of children: Not on file    Years of education: Not on file    Highest education level: Not on file   Occupational History    Not on file   Tobacco Use    Smoking status: Former Smoker     Packs/day: 1.00     Years: 20.00     Pack years: 20.00     Quit date: 1976     Years since quittin.1    Smokeless tobacco: Never Used   Vaping Use    Vaping Use: Never used   Substance and Sexual Activity    Alcohol use: No    Drug use: No    Sexual activity: Not on file   Other Topics Concern    Not on file   Social History Narrative    Not on file     Social Determinants of Health     Financial Resource Strain: Low Risk     Difficulty of Paying Living Expenses: Not hard at all   Food Insecurity: No Food Insecurity    Worried About Running Out of Food in the Last Year: Never true    [place] of Food in the Last Year: Never true   Transportation Needs:     Lack of Transportation (Medical): Not on file    Lack of Transportation (Non-Medical):  Not on file   Physical Activity:     Days of Exercise per Week: Not on file    Minutes of Exercise per Session: Not on file   Stress:     Feeling of Stress : Not on file   Social Connections:     Frequency of Communication with Friends and Family: Not on file    Frequency of Social Gatherings with Friends and Family: Not on file    Attends [employer] Services: Not on file    Active Member of Clubs or Organizations: Not on file    Attends Club or Organization Meetings: Not on file    Marital Status: Not on file   Intimate Partner Violence:     Fear of Current or Ex-Partner: Not on file    Emotionally Abused: Not on file    Physically Abused: Not on file    Sexually Abused: Not on file   Housing Stability:     Unable to Pay for Housing in the Last Year: Not on file    Number of [place] in the Last Year: Not on file    Unstable Housing in the Last Year: Not on file       Family History:     Family History   Problem Relation Age of Onset    Cancer Mother     Heart Attack Father       Medical Decision Making:   I have independently reviewed/ordered the following labs:    CBC with Differential:   Recent Labs     02/01/22  0732 02/02/22  0459   WBC 7.6 5.9   HGB 8.1* 7.2*   HCT 24.2* 22.3*    163   LYMPHOPCT 13* 13*   MONOPCT 15* 16*     BMP:  Recent Labs     02/01/22  0732 02/02/22  0459   * 133*   K 3.4* 4.2   CL 95* 99   CO2 24 23   BUN 24* 22   CREATININE 1.12 0.80   MG 1.7  --      Hepatic Function Panel: No results for input(s): PROT, LABALBU, BILIDIR, IBILI, BILITOT, ALKPHOS, ALT, AST in the last 72 hours. No results for input(s): RPR in the last 72 hours. No results for input(s): HIV in the last 72 hours. No results for input(s): BC in the last 72 hours. Lab Results   Component Value Date    CREATININE 0.80 02/02/2022    GLUCOSE 95 02/02/2022    GLUCOSE 99 09/13/2011       Detailed results: Thank you for allowing us to participate in the care of this patient. Please call with questions.     This note is created with the assistance of a speech recognition program.  While intending to generate adocument that actually reflects the content of the visit, the document can still have some errors including those of syntax and sound a like substitutions which may escape proof reading. It such instances, actual meaningcan be extrapolated by contextual diversion.     JULIAN Manley - CNP  Office: [phone]  Perfect serve / office [phone]

## 2022-02-02 NOTE — PLAN OF CARE
Brief Orthopedic Plan of Care     Placed Prevena wound vac on patient without difficulty over left knee incision. Patient tolerated well. Noted to have good suction. OK to change canister PRN if it fills. Please page ortho if questions or concerns with Jimena Silvestre.      Olin Felty, DO  PGY-2 Orthopedic Surgery  3:38 PM 2/2/2022

## 2022-02-02 NOTE — PLAN OF CARE
Problem: Breathing Pattern - Ineffective  Goal: Ability to achieve and maintain a regular respiratory rate  Outcome: Ongoing     Problem: Isolation Precautions - Risk of Spread of Infection  Goal: Prevent transmission of infection  Outcome: Ongoing     Problem: Pain:  Goal: Pain level will decrease  Description: Pain level will decrease  Outcome: Ongoing     Problem: Skin Integrity:  Goal: Will show no infection signs and symptoms  Description: Will show no infection signs and symptoms  Outcome: Ongoing     Problem: Isolation Precautions - Risk of Spread of Infection  Goal: Prevent transmission of infection  Outcome: Ongoing     Problem: Pain:  Goal: Pain level will decrease  Description: Pain level will decrease  Outcome: Ongoing     Problem: Skin Integrity:  Goal: Will show no infection signs and symptoms  Description: Will show no infection signs and symptoms  Outcome: Ongoing   Pt medicated with pain meds , dressing reviewd and renewed by team, left resting in bed.

## 2022-02-02 NOTE — CARE COORDINATION
Transitional planning-called Cindy at [place] and left message. 1907-call from Marcella at [address] start precert tomorrow. Can take Monday when he is 10 days post [id number]-7-0-2022.

## 2022-02-02 NOTE — PROGRESS NOTES
Physical Therapy  Facility/Department: [facility] CAR 2  Daily Treatment Note  NAME: Belinda Fam  : 1945  MRN: [id number]    Date of Service: 2022    Discharge Recommendations:  Patient would benefit from continued therapy after discharge   PT Equipment Recommendations  Equipment Needed: Yes  Mobility Devices: Ada Stevan: Rolling    Assessment   Body structures, Functions, Activity limitations: Decreased functional mobility ; Decreased posture;Decreased endurance;Decreased ROM; Decreased strength;Decreased balance; Increased pain  Assessment: Pt pt was overall MAX A for funtional mobility able to amb 2ft with RW MAX A, had a major LOB , Limited by pain , nausea and vomiting. Pt able to tolerate weight bearing through LLE this date but requires physical assistance advancing LLE during ambulation. Pt would be unsafe to return to prior living arrangements upon discharge. Pt would benefit from additional intense PT to address deficits. Prognosis: Good  PT Education: Goals;Transfer Training;PT Role;Functional Mobility Training;General Safety;Weight-bearing Education;Plan of Care;Gait Training  REQUIRES PT FOLLOW UP: Yes  Activity Tolerance  Activity Tolerance: Patient limited by endurance; Patient limited by pain;Treatment limited secondary to medical complications (free text)  Activity Tolerance: nausea and vomiting. Patient Diagnosis(es): The encounter diagnosis was Left displaced femoral neck fracture ([facility] [address]      has a past medical history of Acute superficial gastritis without hemorrhage, Anastomotic stricture of stomach, Asthma, Atrial fibrillation ([facility] [address] Calculus of gallbladder without cholecystitis without obstruction, Chronic diastolic heart failure (HCC), Chronic rhinosinusitis, Class 2 severe obesity due to excess calories with serious comorbidity and body mass index (BMI) of 36.0 to 36.9 in adult [facility]), COPD (chronic obstructive pulmonary disease) ([facility] [address] E. coli septicemia ([facility] [address] E. coli UTI, Foot drop, right, Fracture of femur ([facility] [address] Gait disorder, Gastric out let obstruction, GERD (gastroesophageal reflux disease), Hallux valgus, acquired, bilateral, Hyperlipidemia, Hypertension, Impaired hearing, Internal hemorrhoids, Lymphedema of both lower extremities, Nausea & vomiting, OA (osteoarthritis) of knee, bilateral, Obesity, MARIAMA on CPAP, Osteoarthritis, Osteoarthritis of lumbar spine, S/P revision of total knee, Septicemia due to E. coli ([facility] [address] Sick sinus syndrome (HCC), Simple chronic bronchitis (HCC), Slow transit constipation, Unspecified sleep apnea, and UTI (urinary tract infection). has a past surgical history that includes Finger amputation (1966); Gastric bypass surgery (1985); Carpal tunnel release; Colonoscopy; Rotator cuff repair; joint replacement (2004 & 2005); Hemorrhoid surgery; pr egd transoral biopsy single/multiple (N/A, 05/25/2018); Upper gastrointestinal endoscopy (08/13/2018); Upper gastrointestinal endoscopy (N/A, 08/13/2018); Upper gastrointestinal endoscopy (N/A, 08/13/2018); Upper gastrointestinal endoscopy (08/16/2018); pr egd flexible foreign body removal (N/A, 08/16/2018); Upper gastrointestinal endoscopy (08/16/2018); Upper gastrointestinal endoscopy (N/A, 10/01/2018); Upper gastrointestinal endoscopy (10/01/2018); Upper gastrointestinal endoscopy (N/A, 11/19/2018); Cystoscopy (01/02/2019); Cystoscopy (N/A, 01/02/2019); Upper gastrointestinal endoscopy (N/A, 10/15/2020); Colonoscopy (N/A, 04/05/2021); Pacemaker insertion (04/09/2021); Upper gastrointestinal endoscopy (N/A, 7/16/2021); hip surgery (Left, 01/30/2022); knee surgery (Left, 01/30/2022); hip surgery (Left, 01/30/2022); Revision Total Knee Arthroplasty (Left, 01/30/2022); Femur fracture surgery (Left, 1/30/2022); and Revision total knee arthroplasty (Left, 1/30/2022).     Restrictions  Restrictions/Precautions  Restrictions/Precautions: Weight Bearing,Isolation,Fall Risk  Required Braces or Orthoses?: Yes  Lower Extremity Weight Bearing Restrictions  Left Lower Extremity Weight Bearing: Weight Bearing As Tolerated  Required Braces or Orthoses  Right Lower Extremity Brace: Ankle Foot Orthotics  Left Lower Extremity Brace: Erika Foot Orthotics  Position Activity Restriction  Other position/activity restrictions: 1/30 L IT fx s/p HWR w/ CMN-fixation; wears BLE AFOs at baseline d/t dropfoot  Subjective   General  Response To Previous Treatment: Patient with no complaints from previous session. Family / Caregiver Present: No  Subjective  Subjective: RN and Pt  agreeable to therapy. Pt supine in bed upon arrival, enies pain at Rest.but c/o pain with mobility. Pt pleasant and cooperative throughout today's session. Pain Screening  Patient Currently in Pain: Yes  Pain Assessment  Pain Level: 7  Pain Type: Surgical pain;Acute pain  Pain Location: Hip;Knee  Pain Orientation: Left  Pain Descriptors: Aching;Sore;Discomfort  Pain Frequency: Continuous  Functional Pain Assessment: Prevents or interferes some active activities and ADLs  Non-Pharmaceutical Pain Intervention(s): Therapeutic presence; Ambulation/Increased Activity;Relaxation techniques;Repositioned; Rest  Response to Pain Intervention: Patient Satisfied  Vital Signs  Patient Currently in Pain: Yes       Orientation  Orientation  Overall Orientation Status: Within Functional Limits  Cognition      Objective   Bed mobility  Supine to Sit: Moderate assistance;Maximum assistance  Sit to Supine: Unable to assess (Pt retired to chair .)  Scooting: Moderate assistance  Comment: Increase time and effort noted . Cueing for hand placement and sequencing with good return. Bilat AFO done in supine prior to bed mobs. Transfers  Sit to Stand: Maximum Assistance  Stand to sit: Maximum Assistance  Stand Pivot Transfers: Maximum Assistance  Comment: transfers performed with RW , Verbal cues for hand placement with good return.  Stood x4 mins weight shifting, Limited by nausea .  Ambulation  Ambulation?: Yes  WB Status: WBAT LLE  More Ambulation?: No  Ambulation 1  Surface: level tile  Device: Rolling Walker  Other Apparatus: AFO; Left;Right  Assistance: Maximum assistance  Quality of Gait: step-to pattern, unsteady, decreased stride length, fair tolerance to LLE weight bearing. Max tactile cueing for pt to advance LLE. Major LOB noted . Gait Deviations: Slow Zoie;Decreased step length;Decreased step height  Distance: 2ft  Comments: limited by pain and nausea , pt c/o of feeling sick and wanting to throw up. Balance  Sitting - Static: Good  Sitting - Dynamic: Good;-  Standing - Static: Fair  Standing - Dynamic: Fair;-  Comments: standing balance assessed while using a RW. x 4 minutes weight shifting prior to gait training. Exercises  Comments: defer d/t nuasea and vomiting. AM-PAC Score  AM-PAC Inpatient Mobility Raw Score : 12 (02/02/22 1409)  -PAC Inpatient T-Scale Score : 35.33 (02/02/22 1409)  Mobility Inpatient CMS 0-100% Score: 68.66 (02/02/22 1409)  Mobility Inpatient CMS G-Code Modifier : CL (02/02/22 1409)          Goals  Short term goals  Time Frame for Short term goals: 14 visits  Short term goal 1: Pt will ambulate 125 feet with least restrictive device and CGA to increase functional independence. Short term goal 2: Pt will tolerate a 35 minute therapy session to promote increased endurance. Short term goal 3: Pt will demonstrate good- standing balance to decrease fall risk. Short term goal 4: Pt will negotiate 5 stairs with no handrails and a SPC to allow the pt to enter prior living arrangements. Short term goal 5: Pt will perform sit<>stand transfer with SBA to increase functional independence.     Plan    Plan  Times per week: 6-7  Times per day: Daily  Current Treatment Recommendations: [place] Training,Gait Training,Functional Mobility Training,Stair training,Safety Education & [employer] Exercise Program,Equipment Evaluation, Education, & procurement,Patient/Caregiver Education & Training  Safety Devices  Type of devices: Left in chair,Call light within reach,Gait belt,Nurse notified,All fall risk precautions in place  Restraints  Initially in place: No     Therapy Time   Individual Concurrent Group Co-treatment   Time In 1014         Time Out 1048         Minutes 34         Timed Code Treatment Minutes: [address] [facility] [place]

## 2022-02-02 NOTE — PLAN OF CARE
Orthopedic Plan of Care:    Subjective:     Evaluated patient at bedside, patient continues to complain of pain in his left knee after surgery. Has not mobilized today with therapy. States he has mild calf discomfort. Denies interval fevers, chills, vomiting, chest pain, SOB, numbness or tingling in the affected extremity. States he is nauseous today. Objective:     LLE - previous optifoam dressing in place, mild sanguinous strikethrough noted at the inferior aspect of the wound. Patient can tolerate approximately 30 degrees of passive ROM to the left knee with moderate discomfort. Mild ecchymosis and erythema around the optifoam dressing consistent with post-op changes. Remaining dressings to the left lower extremity clean, dry and intact. Patient able to actively extend the great toe, ankle motion limited secondary to AFO present. Endorses generalized sensation to the foot and ankle. Mild TTP to the calf, mild discomfort with Jorden's sign. Assessment:     POD #3 from removal of deep orthopedic hardware left femur, and cephalomedullary nail fixation left intertrochanteric femur fracture    Plan: Will place prevena incisional vac over patients left knee wound to help prevent further drainage and stop post-op oozing    Given patient's multiple revisions to the left knee will place patient on Keflex 500 mg PO QID x 2 weeks for prophylaxis.     Patient to follow up with Dr. Coleen Glynn 2 weeks post-op    Please page Ortho with questions    Chyna Snell DO  Orthopedic Surgery Resident, PGY-5  [address] [place] [address]

## 2022-02-02 NOTE — PROGRESS NOTES
Comprehensive Nutrition Assessment    Type and Reason for Visit:  Reassess    Nutrition Recommendations/Plan: Continue current Pureed diet - as able suggest advancing to a Regular diet. Will continue to provide Ensure Enlive oral supplements with all meals. Encourage/monitor PO intakes as tolerated. Will monitor labs, weights, and plan of care. Nutrition Assessment:  Pt reports he has been eating some of his meals but does not have much of an appetite. Noted pt was modified to Pureed instead of a regular diet - suggest advancing to a regular diet as able. Pt has been drinking some of the Ensure supplements. Observed breakfast tray which pt ate 25% of his meal.  Noted no BM since admission. Labs reviewed: Na 133 mmol/L. Meds include: Colace, Creon, Glycolax, NaCl Tablets. Malnutrition Assessment:  Malnutrition Status: Moderate malnutrition    Context:  Chronic Illness     Findings of the 6 clinical characteristics of malnutrition:  Energy Intake:  Mild decrease in energy intake   Weight Loss:  7 - Greater than 7.5% over 3 months     Body Fat Loss:  1 - Mild body fat loss Orbital,Buccal region   Muscle Mass Loss:   (Moderate muscle mass loss) Clavicles (pectoralis & deltoids)  Fluid Accumulation:  1 - Mild (to Moderate) Extremities   Strength:  Not Performed    Estimated Daily Nutrient Needs:  Energy (kcal):  25 kcal/kg = 7710-3902 kcals/day; Weight Used for Energy Requirements:  Current     Protein (g):  90-115gm pro/d (1.2-1.5gm/kg); Weight Used for Protein Requirements:  Current        Fluid (ml/day):  1900ml/d (25ml/kg); Method Used for Fluid Requirements:  ml/Kg      Nutrition Related Findings:  labs/meds reviewed. no BM since admission. Wounds:  Stage II,Pressure Injury (to coccyx; multiple surgical incisions)       Current Nutrition Therapies:    ADULT ORAL NUTRITION SUPPLEMENT; AM Snack, PM Snack, HS Snack; Standard High Calorie/High Protein Oral Supplement  ADULT DIET;  Dysphagia - Pureed    Anthropometric Measures:  · Height: 5' 10\" (177.8 cm)  · Current Body Weight: 170 lb 14.4 oz (77.5 kg)   · Admission Body Weight: 170 lb 13.7 oz (77.5 kg)    · Usual Body Weight: 193 lb 9.6 oz (87.8 kg) (11/10/21 bed scale per chart review)     · Ideal Body Weight: 166 lbs; % Ideal Body Weight 103 %   · BMI: 24.5  · BMI Categories: Normal Weight (BMI 18.5-24. 9)       Nutrition Diagnosis:   · Increased nutrient needs related to increase demand for energy/nutrients as evidenced by wounds    · Inadequate oral intake related to  (decreased appetite) as evidenced by intake 0-25% (variable PO intakes; need for ONS)    Nutrition Interventions:   Food and/or Nutrient Delivery:  Continue Current Diet,Continue Oral Nutrition Supplement  Nutrition Education/Counseling:  No recommendation at this time   Coordination of Nutrition Care:  Continue to monitor while inpatient    Goals:  Meet 50% or greater of estimated nutrition needs       Nutrition Monitoring and Evaluation:   Behavioral-Environmental Outcomes:  None Identified   Food/Nutrient Intake Outcomes:  Food and Nutrient Intake,Supplement Intake  Physical Signs/Symptoms Outcomes:  Biochemical Data,GI Status,Constipation,Fluid Status or Edema,Hemodynamic Status,Nutrition Focused Physical Findings,Skin,Weight     Discharge Planning:     Too soon to determine     Electronically signed by Vinicius Isaacs RD, LD on 2/2/22 at 11:47 AM EST    Contact: [phone]

## 2022-02-03 LAB
ABSOLUTE EOS #: 0.27 K/UL (ref 0–0.44)
ABSOLUTE IMMATURE GRANULOCYTE: 0.06 K/UL (ref 0–0.3)
ABSOLUTE LYMPH #: 0.74 K/UL (ref 1.1–3.7)
ABSOLUTE MONO #: 0.92 K/UL (ref 0.1–1.2)
ANION GAP SERPL CALCULATED.3IONS-SCNC: 10 MMOL/L (ref 9–17)
BASOPHILS # BLD: 1 % (ref 0–2)
BASOPHILS ABSOLUTE: 0.03 K/UL (ref 0–0.2)
BUN BLDV-MCNC: 17 MG/DL (ref 8–23)
BUN/CREAT BLD: ABNORMAL (ref 9–20)
CALCIUM SERPL-MCNC: 8.3 MG/DL (ref 8.6–10.4)
CHLORIDE BLD-SCNC: 98 MMOL/L (ref 98–107)
CO2: 22 MMOL/L (ref 20–31)
CREAT SERPL-MCNC: 0.65 MG/DL (ref 0.7–1.2)
DIFFERENTIAL TYPE: ABNORMAL
EOSINOPHILS RELATIVE PERCENT: 5 % (ref 1–4)
GFR AFRICAN AMERICAN: >60 ML/MIN
GFR NON-AFRICAN AMERICAN: >60 ML/MIN
GFR SERPL CREATININE-BSD FRML MDRD: ABNORMAL ML/MIN/{1.73_M2}
GFR SERPL CREATININE-BSD FRML MDRD: ABNORMAL ML/MIN/{1.73_M2}
GLUCOSE BLD-MCNC: 96 MG/DL (ref 70–99)
HCT VFR BLD CALC: 21.4 % (ref 40.7–50.3)
HEMOGLOBIN: 7 G/DL (ref 13–17)
IMMATURE GRANULOCYTES: 1 %
LYMPHOCYTES # BLD: 13 % (ref 24–43)
MCH RBC QN AUTO: 31.7 PG (ref 25.2–33.5)
MCHC RBC AUTO-ENTMCNC: 32.7 G/DL (ref 28.4–34.8)
MCV RBC AUTO: 96.8 FL (ref 82.6–102.9)
MONOCYTES # BLD: 16 % (ref 3–12)
NRBC AUTOMATED: 0 PER 100 WBC
PDW BLD-RTO: 13.4 % (ref 11.8–14.4)
PLATELET # BLD: 121 K/UL (ref 138–453)
PLATELET ESTIMATE: ABNORMAL
PMV BLD AUTO: 11.3 FL (ref 8.1–13.5)
POTASSIUM SERPL-SCNC: 4.6 MMOL/L (ref 3.7–5.3)
RBC # BLD: 2.21 M/UL (ref 4.21–5.77)
RBC # BLD: ABNORMAL 10*6/UL
SEG NEUTROPHILS: 64 % (ref 36–65)
SEGMENTED NEUTROPHILS ABSOLUTE COUNT: 3.65 K/UL (ref 1.5–8.1)
SODIUM BLD-SCNC: 130 MMOL/L (ref 135–144)
WBC # BLD: 5.7 K/UL (ref 3.5–11.3)
WBC # BLD: ABNORMAL 10*3/UL

## 2022-02-03 PROCEDURE — 97110 THERAPEUTIC EXERCISES: CPT

## 2022-02-03 PROCEDURE — 6370000000 HC RX 637 (ALT 250 FOR IP): Performed by: STUDENT IN AN ORGANIZED HEALTH CARE EDUCATION/TRAINING PROGRAM

## 2022-02-03 PROCEDURE — 6370000000 HC RX 637 (ALT 250 FOR IP): Performed by: HEALTH CARE PROVIDER

## 2022-02-03 PROCEDURE — 99232 SBSQ HOSP IP/OBS MODERATE 35: CPT | Performed by: NURSE PRACTITIONER

## 2022-02-03 PROCEDURE — 36415 COLL VENOUS BLD VENIPUNCTURE: CPT

## 2022-02-03 PROCEDURE — 1200000000 HC SEMI PRIVATE

## 2022-02-03 PROCEDURE — 80048 BASIC METABOLIC PNL TOTAL CA: CPT

## 2022-02-03 PROCEDURE — 6360000002 HC RX W HCPCS

## 2022-02-03 PROCEDURE — 2580000003 HC RX 258: Performed by: STUDENT IN AN ORGANIZED HEALTH CARE EDUCATION/TRAINING PROGRAM

## 2022-02-03 PROCEDURE — 85025 COMPLETE CBC W/AUTO DIFF WBC: CPT

## 2022-02-03 RX ADMIN — CEPHALEXIN 500 MG: 500 CAPSULE ORAL at 20:10

## 2022-02-03 RX ADMIN — ROPINIROLE HYDROCHLORIDE 0.25 MG: 0.25 TABLET, FILM COATED ORAL at 09:40

## 2022-02-03 RX ADMIN — PANCRELIPASE 24000 UNITS: 24000; 76000; 120000 CAPSULE, DELAYED RELEASE PELLETS ORAL at 09:41

## 2022-02-03 RX ADMIN — TAMSULOSIN HYDROCHLORIDE 0.4 MG: 0.4 CAPSULE ORAL at 09:41

## 2022-02-03 RX ADMIN — ACETAMINOPHEN 1000 MG: 325 TABLET ORAL at 06:13

## 2022-02-03 RX ADMIN — SENNOSIDES 8.6 MG: 8.6 TABLET, COATED ORAL at 20:10

## 2022-02-03 RX ADMIN — METHOCARBAMOL TABLETS 750 MG: 750 TABLET, COATED ORAL at 06:09

## 2022-02-03 RX ADMIN — GABAPENTIN 300 MG: 300 CAPSULE ORAL at 06:09

## 2022-02-03 RX ADMIN — ENOXAPARIN SODIUM 30 MG: 100 INJECTION SUBCUTANEOUS at 09:40

## 2022-02-03 RX ADMIN — SODIUM CHLORIDE, PRESERVATIVE FREE 10 ML: 5 INJECTION INTRAVENOUS at 21:11

## 2022-02-03 RX ADMIN — Medication 500000 UNITS: at 12:10

## 2022-02-03 RX ADMIN — CEPHALEXIN 500 MG: 500 CAPSULE ORAL at 16:14

## 2022-02-03 RX ADMIN — BUMETANIDE 2 MG: 1 TABLET ORAL at 12:11

## 2022-02-03 RX ADMIN — GABAPENTIN 300 MG: 300 CAPSULE ORAL at 21:12

## 2022-02-03 RX ADMIN — METHOCARBAMOL TABLETS 750 MG: 750 TABLET, COATED ORAL at 12:11

## 2022-02-03 RX ADMIN — Medication 500000 UNITS: at 09:40

## 2022-02-03 RX ADMIN — BUDESONIDE AND FORMOTEROL FUMARATE DIHYDRATE 2 PUFF: 160; 4.5 AEROSOL RESPIRATORY (INHALATION) at 21:11

## 2022-02-03 RX ADMIN — ACETAMINOPHEN 1000 MG: 325 TABLET ORAL at 21:11

## 2022-02-03 RX ADMIN — PANCRELIPASE 24000 UNITS: 24000; 76000; 120000 CAPSULE, DELAYED RELEASE PELLETS ORAL at 12:11

## 2022-02-03 RX ADMIN — Medication 500000 UNITS: at 20:10

## 2022-02-03 RX ADMIN — PANCRELIPASE 24000 UNITS: 24000; 76000; 120000 CAPSULE, DELAYED RELEASE PELLETS ORAL at 16:14

## 2022-02-03 RX ADMIN — METHOCARBAMOL TABLETS 750 MG: 750 TABLET, COATED ORAL at 17:27

## 2022-02-03 RX ADMIN — PANTOPRAZOLE SODIUM 40 MG: 40 TABLET, DELAYED RELEASE ORAL at 06:08

## 2022-02-03 RX ADMIN — Medication 500000 UNITS: at 16:15

## 2022-02-03 RX ADMIN — SODIUM CHLORIDE, PRESERVATIVE FREE 10 ML: 5 INJECTION INTRAVENOUS at 09:46

## 2022-02-03 RX ADMIN — CEPHALEXIN 500 MG: 500 CAPSULE ORAL at 12:11

## 2022-02-03 RX ADMIN — ENOXAPARIN SODIUM 30 MG: 100 INJECTION SUBCUTANEOUS at 20:10

## 2022-02-03 RX ADMIN — POLYETHYLENE GLYCOL 3350 17 G: 17 POWDER, FOR SOLUTION ORAL at 09:41

## 2022-02-03 RX ADMIN — CEPHALEXIN 500 MG: 500 CAPSULE ORAL at 09:41

## 2022-02-03 RX ADMIN — ACETAMINOPHEN 1000 MG: 325 TABLET ORAL at 16:14

## 2022-02-03 RX ADMIN — GABAPENTIN 300 MG: 300 CAPSULE ORAL at 16:15

## 2022-02-03 RX ADMIN — DOCUSATE SODIUM 100 MG: 100 CAPSULE ORAL at 09:42

## 2022-02-03 RX ADMIN — ROPINIROLE HYDROCHLORIDE 0.25 MG: 0.25 TABLET, FILM COATED ORAL at 20:10

## 2022-02-03 ASSESSMENT — PAIN DESCRIPTION - DESCRIPTORS: DESCRIPTORS: ACHING;DISCOMFORT;SPASM

## 2022-02-03 ASSESSMENT — PAIN SCALES - GENERAL
PAINLEVEL_OUTOF10: 7
PAINLEVEL_OUTOF10: 3
PAINLEVEL_OUTOF10: 0
PAINLEVEL_OUTOF10: 7
PAINLEVEL_OUTOF10: 0
PAINLEVEL_OUTOF10: 7

## 2022-02-03 ASSESSMENT — ENCOUNTER SYMPTOMS
COLOR CHANGE: 0
VOMITING: 0
COUGH: 0
APNEA: 0
NAUSEA: 0
ABDOMINAL DISTENTION: 0
DIARRHEA: 0
SHORTNESS OF BREATH: 1
EYE DISCHARGE: 0

## 2022-02-03 ASSESSMENT — PAIN DESCRIPTION - ONSET: ONSET: ON-GOING

## 2022-02-03 ASSESSMENT — PAIN DESCRIPTION - PAIN TYPE: TYPE: SURGICAL PAIN;ACUTE PAIN

## 2022-02-03 ASSESSMENT — PAIN DESCRIPTION - LOCATION: LOCATION: KNEE;HIP

## 2022-02-03 ASSESSMENT — PAIN - FUNCTIONAL ASSESSMENT: PAIN_FUNCTIONAL_ASSESSMENT: PREVENTS OR INTERFERES SOME ACTIVE ACTIVITIES AND ADLS

## 2022-02-03 ASSESSMENT — PAIN DESCRIPTION - FREQUENCY: FREQUENCY: CONTINUOUS

## 2022-02-03 NOTE — PROGRESS NOTES
Progress Note    Patient:  Renate Light  YOB: 1945     77 y.o. male    Subjective:  Patient seen and examined at bedside this morning. No new complaints or concerns per patient this morning. Still having pain in LLE. No acute issues overnight per nursing. Denies: fever/chills, HA, CP, SOB, N/V, dysuria, or numbness/tingling in extremities. Was able to sit in chair yesterday with physical therapy. Noted that Remi Alisha had blood pooling in inferior aspect of dressing with no output into canister. Objective:   Vitals:    02/03/22 0400   BP: 108/70   Pulse: 74   Resp: 15   Temp: 99 °F (37.2 °C)   SpO2:      Gen: NAD, cooperative   Cardiovascular: Regular rate  Respiratory: no audible wheezing, symmetrical chest expansion   MSK: LLE: Prevena vac on, with sponge suctioned down but blood pooled on inferior aspect of incision. Removed sponge and replaced with new Prevena Plus dressing/vac. Skin surrounding incision ecchymotic and tender to palpation. Dressings at the hip c/d/i. Toleration 30 degrees of passive motion at the knee. EHL/FHL/TA/GSC gross motor intact. Sensation intact to light touch. DP/PT pulse 2+. Recent Labs     02/02/22  0459   WBC 5.9   HGB 7.2*   HCT 22.3*      *   K 4.2   BUN 22   CREATININE 0.80   GLUCOSE 95       Meds: Lovenox, Keflex   See rec for complete list    Impression: 77 y.o. male being seen and evaluated POD#4 from removal of deep orthopedic hardware left femur, and cephalomedullary nail fixation left intertrochanteric femur fracture      Plan:     -Prevena dressing taken off and replaced with Prevena Plus device. Please notify ortho if suction no longer functioning. Monitor output into canister each shift. -Given multiple revisions to patient's left knee, started patient on Keflex 500 mg QID (2/2 start date). Patient to receive a total of 14 days of Keflex.  Please ensure patient is discharged with remaining days of Keflex to total 14 days of antibiotic treatment  -WBAT LLE   -Hemoglobin this AM 7.0. Defer to primary team regarding transfusion management.   -Pain control: per primary team discretion. Would recommend multimodal pain management protocols   -Tolerating PO intake well  -Voiding Well  -Ice (20 min, 1 hour off) and elevation for edema/pain control  -Encourage deep breathing and IS  -DVT ppx: EPC.  OK for chemical anticoagulation from orthopedics perspective   -PT/OT to continue to evaluate and treat  -OK for discharge from ortho perspective and to follow up with Dr. Beverly Ziegler 10-14 days from date of surgery   -Please page Ortho with any questions or concerns    Danny Myers, DO  PGY-2 Orthopedic Surgery  7:10 AM 2/3/2022

## 2022-02-03 NOTE — CARE COORDINATION
Transitional planning-call from Cindy at [facility] ARU-starting precert.

## 2022-02-03 NOTE — PROGRESS NOTES
Initiated precert for ARU with [facility] with pending ref [id number]. Notified ODESSA Irving CM. Verified pt's benefits for ARU with [facility] which are as follows:  $490/day for days 1-4, with 100% coverage beginning day 5. No deductible per Hazle Tanner.

## 2022-02-03 NOTE — PROGRESS NOTES
Physical Therapy  Facility/Department: [facility] CAR 2  Daily Treatment Note  NAME: Beatrice Beltran  : 1945  MRN: [id number]    Date of Service: 2/3/2022    Discharge Recommendations:  Patient would benefit from continued therapy after discharge        Assessment   Body structures, Functions, Activity limitations: Decreased functional mobility ; Decreased posture;Decreased endurance;Decreased ROM; Decreased strength;Decreased balance; Increased pain  Assessment: RN agreeable to pt being seen for supine exercises but didn't want pt out of bed due to hemoglobin levels. Pt performed supine exercises with AAROM to L LE and AROM to R LE with frequent rest periods due to fatigue. Previous therapy session: Pt able to tolerate weight bearing through LLE, but requires physical assistance advancing LLE during ambulation. Pt would be unsafe to return to prior living arrangements upon discharge. Pt would benefit from additional intense PT to address deficits. Prognosis: Good  PT Education: Goals;PT Role;General Safety;Weight-bearing Education;Plan of Care  Patient Education: Performed supine exercisesonly today due to hemoglobin  REQUIRES PT FOLLOW UP: Yes  Activity Tolerance  Activity Tolerance: Patient limited by endurance; Patient limited by pain;Treatment limited secondary to medical complications (free text)  Activity Tolerance: Pt easily fatigued this day with exercises; needed frequent rest periods. Patient Diagnosis(es): The encounter diagnosis was Left displaced femoral neck fracture ([facility] [address]      has a past medical history of Acute superficial gastritis without hemorrhage, Anastomotic stricture of stomach, Asthma, Atrial fibrillation ([address] Calculus of gallbladder without cholecystitis without obstruction, Chronic diastolic heart failure (HCC), Chronic rhinosinusitis, Class 2 severe obesity due to excess calories with serious comorbidity and body mass index (BMI) of 36.0 to 36.9 in [facility]), COPD (chronic obstructive pulmonary disease) ([facility] [address] E. coli septicemia ([facility] [address] E. coli UTI, Foot drop, right, Fracture of femur ([facility] [address] Gait disorder, Gastric out let obstruction, GERD (gastroesophageal reflux disease), Hallux valgus, acquired, bilateral, Hyperlipidemia, Hypertension, Impaired hearing, Internal hemorrhoids, Lymphedema of both lower extremities, Nausea & vomiting, OA (osteoarthritis) of knee, bilateral, Obesity, MARIAMA on CPAP, Osteoarthritis, Osteoarthritis of lumbar spine, S/P revision of total knee, Septicemia due to E. coli (HCC), Sick sinus syndrome (HCC), Simple chronic bronchitis (HCC), Slow transit constipation, Unspecified sleep apnea, and UTI (urinary tract infection). has a past surgical history that includes Finger amputation (1966); Gastric bypass surgery (1985); Carpal tunnel release; Colonoscopy; Rotator cuff repair; joint replacement (2004 & 2005); Hemorrhoid surgery; pr egd transoral biopsy single/multiple (N/A, 05/25/2018); Upper gastrointestinal endoscopy (08/13/2018); Upper gastrointestinal endoscopy (N/A, 08/13/2018); Upper gastrointestinal endoscopy (N/A, 08/13/2018); Upper gastrointestinal endoscopy (08/16/2018); pr egd flexible foreign body removal (N/A, 08/16/2018); Upper gastrointestinal endoscopy (08/16/2018); Upper gastrointestinal endoscopy (N/A, 10/01/2018); Upper gastrointestinal endoscopy (10/01/2018); Upper gastrointestinal endoscopy (N/A, 11/19/2018); Cystoscopy (01/02/2019); Cystoscopy (N/A, 01/02/2019); Upper gastrointestinal endoscopy (N/A, 10/15/2020); Colonoscopy (N/A, 04/05/2021); Pacemaker insertion (04/09/2021); Upper gastrointestinal endoscopy (N/A, 7/16/2021); hip surgery (Left, 01/30/2022); knee surgery (Left, 01/30/2022); hip surgery (Left, 01/30/2022); Revision Total Knee Arthroplasty (Left, 01/30/2022); Femur fracture surgery (Left, 1/30/2022); and Revision total knee arthroplasty (Left, 1/30/2022).     Restrictions  Restrictions/Precautions  Restrictions/Precautions: Weight Bearing,Isolation,Fall Risk  Required Braces or Orthoses?: Yes (B AFOs)  Lower Extremity Weight Bearing Restrictions  Left Lower Extremity Weight Bearing: Weight Bearing As Tolerated  Required Braces or Orthoses  Right Lower Extremity Brace: Ankle Foot Orthotics  Left Lower Extremity Brace: Erika Foot Orthotics  Position Activity Restriction  Other position/activity restrictions: 1/30 L IT fx s/p HWR w/ CMN-fixation; wears BLE AFOs at baseline d/t dropfoot; wound vac to L knee  Subjective   General  Chart Reviewed: Yes  Response To Previous Treatment: Patient with no complaints from previous session. Subjective  Subjective: RN and Pt  agreeable to therapy. RN agreeable to PT with Hemoglobin at 7.0.  RN didn't want pt to amb or transfer out of Bed. Pt supine in bed upon arrival. Rest.but c/o pain with mobility. Pt pleasant and cooperative throughout today's session. General Comment  Comments: Pt retired to supine in bed with bed alarm and call light.   Pain Screening  Patient Currently in Pain: Yes  Pain Assessment  Pain Level: 7  Pain Type: Surgical pain;Acute pain  Pain Location: Knee;Hip  Pain Descriptors: Aching;Discomfort;Spasm  Pain Frequency: Continuous  Pain Onset: On-going  Functional Pain Assessment: Prevents or interferes some active activities and ADLs  Vital Signs  Patient Currently in Pain: Yes       Orientation     Cognition      Objective   Bed mobility  Rolling to Right: Minimal assistance  Comment: Didn't assess supine<->sit today due to hemoglobin  Transfers  Sit to Stand: Unable to assess (due to hemoglobin levels)  Stand to sit: Unable to assess (due to hemoglobin)  Ambulation  Ambulation?: No (due to hemoglobin)        Exercises  Quad Sets: x 10 reps with 3 second hold  Heelslides: x 10 reps AAROM to L LE and AROM to R LE  Gluteal Sets: x 10 reps  Hip Abduction: x 10 reps AAROM to L LE and AROM to R LE  Knee Short Arc Quad: x 10 reps AAROM to L LE and AROM to R LE  Ankle Pumps: x 10 reps limited ROM      AM-PAC Score  AM-PAC Inpatient Mobility Raw Score : 12 (02/03/22 1146)  AM-PAC Inpatient T-Scale Score : 35.33 (02/03/22 1146)  Mobility Inpatient CMS 0-100% Score: 68.66 (02/03/22 1146)  Mobility Inpatient CMS G-Code Modifier : CL (02/03/22 1146)          Goals  Short term goals  Time Frame for Short term goals: 14 visits  Short term goal 1: Pt will ambulate 125 feet with least restrictive device and CGA to increase functional independence. Short term goal 2: Pt will tolerate a 35 minute therapy session to promote increased endurance. Short term goal 3: Pt will demonstrate good- standing balance to decrease fall risk. Short term goal 4: Pt will negotiate 5 stairs with no handrails and a SPC to allow the pt to enter prior living arrangements. Short term goal 5: Pt will perform sit<>stand transfer with SBA to increase functional independence.     Plan    Plan  Times per week: 6-7  Times per day: Daily  Current Treatment Recommendations: Strengthening,Transfer Training,Endurance Training,ROM,Balance Training,Gait Training,Functional Mobility Training,Stair training,Safety Education & Training,Home Exercise Program,Equipment Evaluation, Education, & procurement,Patient/Caregiver Education & Training  Safety Devices  Type of devices: Nurse notified,All fall risk precautions in place,Bed alarm in place,Left in bed (Gait belt not used as pt remained in bed)  Restraints  Initially in place: No     Therapy Time   Individual Concurrent Group Co-treatment   Time In 1109         Time Out 1139         Minutes 30                 Jaguar Patel

## 2022-02-03 NOTE — PROGRESS NOTES
PROGRESS NOTE          PATIENT NAME: [address] RECORD NO. [id number]  DATE: 2/3/2022  SURGEON: Jessica Chapin  PRIMARY CARE PHYSICIAN: JULIAN Verde CNP    HD: # 5    ASSESSMENT    Patient Active Problem List   Diagnosis    Chronic atrial fibrillation ([facility] [address]    Dyslipidemia    Gastroesophageal reflux disease without esophagitis    Osteoarthritis of lumbar spine    OA (osteoarthritis) of knee, bilateral    Foot drop, right    Hallux valgus, acquired, bilateral    Hearing impairment    Essential hypertension    Slow transit constipation    MARIAMA on CPAP    COPD (chronic obstructive pulmonary disease) (HCC)    Chronic diastolic heart failure (HCC)    History of bariatric surgery including banding leading to esophageal stricture and aspiration    BPH (benign prostatic hyperplasia)    Myalgia    Atrial fibrillation with slow ventricular response ([address]    Pacemaker pocket hematoma, initial encounter    Pulmonary hypertension ([facility] [address]    Presence of permanent cardiac pacemaker    Fall at home, initial encounter    Left displaced femoral neck fracture ([facility] [address]    Retained orthopedic hardware    COVID-19    Elevated C-reactive protein (CRP)       MEDICAL DECISION MAKING AND PLAN    Neuro -MMPT -tylenol, gabapentin, robaxin, home requip. More muscle spasm at knee this am.  Allergy to oxicodone. CV -BP remains systolic 92F, asymptomatic, holding home bumex and metoprolol. Heme: post op Hg drop w/hematoma, s/p 1 unit prbc on 1/31, Hg 7.2 yesterday. 7.0 and stable this AM.. Cont to monitor leg for re-bleed. Lovenox 30 mg BID restarted yesterday. Lower extremity doppler yesterday was negative for DVT. Prevena on left knee. Continue to monitor output. Pulm-encourage incentive spirometry, 2 L nasal cannula for COPD, Satting well without difficulty breathing. Renal -voiding spontaneously, monitor creatinine 1.33 postop, now . 65. Na 130, cont daily labs, encourage po intake.    GI -regular diet  ID -infectious disease on board for Covid received Sotrivimab   Endo- glucose less than 180  MSK-status post left femur CMN, weightbearing as tolerated left lower extremity, Encourage PT/OT  Dispo -PT OT discharge planning. Chief Complaint: [facility] with right hip IT fracture s/p right femoral HWR and CMN insertion POD#5    SUBJECTIVE    Dev Robbins was seen and examined at bedside this morning. No new complaints or concerns per patient this morning other than some pain. States maybe mild SOB. No acute events per nursing. Denies: fever/chills, HA, CP, N/V, dysuria, or numbness/tingling in extremities. He reports having a bowel movement last evening. Is urinating since surgery. Tolerating PO intake but states reduced apatite. Complications with his prevena that was again switched this morning. Able to ambulate 2 feet with PT yesterday. OBJECTIVE  VITALS: Temp: Temp: 99 °F (37.2 °C)Temp  Av.6 °F (37 °C)  Min: 97.7 °F (36.5 °C)  Max: 99.5 °F (29.3 °C) BP Systolic (39JPB), SXV:235 , Min:94 , CUK:905   Diastolic (66CTX), HEU:78, Min:49, Max:77   Pulse Pulse  Av.7  Min: 74  Max: 84 Resp Resp  Av.3  Min: 15  Max: 21 Pulse ox SpO2  Av.3 %  Min: 96 %  Max: 100 %  CONSTITUTIONAL: awake, alert, cooperative, no apparent distress  HEAD: atraumatic, normocephalic  EYES: sclera clear, pupils equal and reactive to light  ENT: ears are symmetric, nares patent   HEENT: moist mucous membranes  NECK: Supple, symmetrical, trachea midline  LUNGS: no respiratory distress, no audible wheezing, no chest wall tenderness, AICD palpable in left chest  CARDIOVASCULAR: Regular rate and rhythm  ABDOMEN: soft, nontender, nondistended, no guarding, no rebound tenderness   MUSCULOSKELETAL: Prevena intact and suctioning. C/D/I. Other soft dressings c/d/i without strike through bleeding. Appropriate tenderness to palpation around hip. Tenderness to palpation along lateral malleolus present.  Mild pain with calf squeeze, possible due to swelling and post op pain. Superficial skin abrasion over anterior shin, which was present at time of presentation. Compartments soft and compressible. EHL/FHL/TA/GSC gross motor intact. Sural, saphenous, superificial/deep peroneal, and plantar nerve distribution SILT. DP pulses 2+ with BCR; foot warm and perfused. NEUROLOGIC: Awake, alert, oriented to name, place and time  EXTREMITIES: peripheral pulses normal, no pedal edema  SKIN: normal coloration and turgor  I/O last 3 completed shifts: In: 840 [P.O.:840]  Out: 2300 [Urine:2300]    Drain/tube output: In: 840 [P.O.:840]  Out: 1500 [Urine:1500]    LAB:  CBC:   Recent Labs     02/01/22  0732 02/02/22  0459 02/03/22  0541   WBC 7.6 5.9 5.7   HGB 8.1* 7.2* 7.0*   HCT 24.2* 22.3* 21.4*   MCV 94.2 95.3 96.8    163 121*     BMP:   Recent Labs     02/01/22  0732 02/02/22  0459 02/03/22  0541   * 133* 130*   K 3.4* 4.2 4.6   CL 95* 99 98   CO2 24 23 22   BUN 24* 22 17   CREATININE 1.12 0.80 0.65*   GLUCOSE 89 95 96     COAGS: No results for input(s): APTT, PROT, INR in the last 72 hours. RADIOLOGY:    VL DUP LEFT LOWER EXTREMITY 2/2/22    Summary        Simultaneous real time imaging utilizing B-Mode, color doppler and    spectral waveform analysis was performed on the left lower extremity for    venous examination of the deep and superficial systems.  Findings are:        Left:    No evidence of deep or superficial venous thrombosis.             [facility],   2/2/22, 8:27 AM

## 2022-02-03 NOTE — PROGRESS NOTES
[facility] -   Infectious diseases evaluation  admission date 1/28/2022    reason for consultation:   covid    Impression :   Current:  · Left hip intertrochanteric fracture after a fall  ·      1/30  S/p Removal of deep orthopedic hardware left femur,                                Cephalomedullary  nail fixation left intertrochanteric femur fracture. · Covid positive, no hypoxemia  · Elevated CRP  · Right lower lobe atelectatic lesions, less likely Covid related  · Underlying COPD  Other:  ·   Discussion / summary of stay / plan of care   ·   Recommendations   · Low  CRP and ferritin  · sotrivimab for asymptomatic Covid in a high risk patient. Received 1/29. · 2 L NC for copd  · 2/1 O2 at 2 L NC which is baseline. Doing well. Okay to discharge when approved by other services. Isolate until 2/7/22  Infection Control Recommendations   · [place] Precautions  · Contact Isolation   · Airborne isolation  · Droplet Isolation      Antimicrobial Stewardship Recommendations   · Off ab    Coordination ofOutpatient Care:   · Estimated Length of IV antimicrobials:  · Patient will need Midline / picc Catheter Insertion:   · Patient will need SNF:  · Patient will need outpatient wound care:     History of Present Illness:   Initial history:  Enid Moore is a 77y.o.-year-old male fell and broke his hip, left intertrochanteric, Ortho on board planning surgical repair. , found to have Covid positive, underlying COPD  Infectious consulted due to Covid positive    Patient on no oxygen  CT scan of the chest reviewed personally, right lower lobe small atelectasis areas, but cannot rule out Covid related-those lesions seem to be isolated in that area, possibly suggestive of atelectasis    Also has A. fib, and has a pacemaker, EF 50 and no valvular disease      1/31   Afebrile. SpO2 100% on 4 L NC. Transfused with 1 unit PRBCs. No SOB. Resting.   Cre increasing, 1.33    2/1  Afebrile. SpO2 100% on 2 L NC. Cre improving. 2/2  Afebrile. SpO2 99% on 3 L NC  Slightly SOB with activity. Pain controlled. Cre improved. 2/3  Low-grade fever 99. SpO2 96% on RA. Interval changes  2/3/2022   Patient Vitals for the past 8 hrs:   BP Temp Pulse Resp   02/03/22 0400 108/70 99 °F (37.2 °C) 74 15       Summary of relevant labs:  Labs:  W 7-10.7-7.6-5.9-5.7  Creat  0.8-1.33-1.12-0.80-0.65  CRP 15  Ferritin  274    Micro:  covid +    Imaging:  Chest x-ray negative    CT abdomen pelvis and chest  Right lower lobe small atelectasis, cannot rule out early COVID pneumonia        I have personally reviewed the past medical history, past surgical history, medications, social history, and family history, and I haveupdated the database accordingly. Allergies:   Darvocet [propoxyphene n-acetaminophen], Other, Oxycodone-acetaminophen, and Propoxyphene     Review of Systems:     Review of Systems   Constitutional: Positive for activity change. HENT: Negative for congestion. Eyes: Negative for discharge. Respiratory: Positive for shortness of breath. Negative for apnea and cough. Slightly SOB with activity. Cardiovascular: Negative for chest pain. Gastrointestinal: Negative for abdominal distention, diarrhea, nausea and vomiting. Endocrine: Negative for polydipsia. Genitourinary: Negative for dysuria. Musculoskeletal: Positive for arthralgias. Skin: Negative for color change. Allergic/Immunologic: Negative for immunocompromised state. Neurological: Negative for dizziness. Hematological: Negative for adenopathy. Psychiatric/Behavioral: Negative for agitation. Physical Examination :       Physical Exam  Vitals and nursing note reviewed. Constitutional:       Appearance: Normal appearance. He is not ill-appearing. HENT:      Head: Normocephalic and atraumatic.       Right Ear: External ear normal.      Left Ear: External ear normal.      Nose: Nose normal.      Mouth/Throat:      Mouth: Mucous membranes are moist.      Pharynx: Oropharynx is clear. Eyes:      General: No scleral icterus. Extraocular Movements: Extraocular movements intact. Conjunctiva/sclera: Conjunctivae normal.      Pupils: Pupils are equal, round, and reactive to light. Cardiovascular:      Rate and Rhythm: Normal rate and regular rhythm. Heart sounds: Normal heart sounds. No murmur heard. No friction rub. Pulmonary:      Effort: Pulmonary effort is normal. No respiratory distress. Breath sounds: No stridor. No rhonchi. Comments: Diminished t/o. Abdominal:      General: Bowel sounds are normal. There is no distension. Palpations: Abdomen is soft. There is no mass. Tenderness: There is no abdominal tenderness. Hernia: No hernia is present. Genitourinary:     Comments: No concepcion. Musculoskeletal:         General: No swelling or deformity. Cervical back: Normal range of motion and neck supple. No rigidity. Skin:     General: Skin is warm and dry. Capillary Refill: Capillary refill takes less than 2 seconds. Coloration: Skin is not jaundiced. Neurological:      Mental Status: He is alert and oriented to person, place, and time. Psychiatric:         Mood and Affect: Mood normal.         Behavior: Behavior normal.         Thought Content:  Thought content normal.         Past Medical History:     Past Medical History:   Diagnosis Date    Acute superficial gastritis without hemorrhage 05/26/2018    Anastomotic stricture of stomach     Asthma     Atrial fibrillation ([address]     On Xarelto 6/27/2020    Calculus of gallbladder without cholecystitis without obstruction 05/02/2017    Chronic diastolic heart failure ([address] 11/17/2017    Chronic rhinosinusitis 04/13/2015    Class 2 severe obesity due to excess calories with serious comorbidity and body mass index (BMI) of 36.0 to 36.9 in adult [facility]) 11/17/2017    COPD (chronic obstructive pulmonary disease) [facility]     E. coli septicemia ([facility]     E. coli UTI     Foot drop, right 07/10/2012    Fracture of femur ([facility] 10/23/2016    Gait disorder 07/20/2016    Gastric out let obstruction     GERD (gastroesophageal reflux disease)     Hallux valgus, acquired, bilateral 06/24/2015    Hyperlipidemia     Hypertension     Impaired hearing 04/13/2015    Internal hemorrhoids 01/03/2017    Lymphedema of both lower extremities 06/24/2015    Nausea & vomiting 11/16/2018    OA (osteoarthritis) of knee, bilateral 12/11/2006    Obesity     MARIAMA on CPAP 05/02/2017    Osteoarthritis     Osteoarthritis of lumbar spine 12/13/2007     replace inactive diagnosis    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 coli (HCC)     Due to UTI     Sick sinus syndrome (HCC)     Simple chronic bronchitis ([facility] 04/10/2018    Slow transit constipation 11/03/2016    Unspecified sleep apnea     UTI (urinary tract infection)        Past Surgical  History:     Past Surgical History:   Procedure Laterality Date    CARPAL TUNNEL RELEASE      bilateral    COLONOSCOPY      COLONOSCOPY N/A 04/05/2021    COLONOSCOPY DIAGNOSTIC performed by Car Weinstein MD at [address]  01/02/2019    by Dr. Luis Angela N/A 01/02/2019    CYSTOSCOPY performed by Chavez Bowie MD at  [address] Left 1/30/2022    LEFT CEPHALOMEDULLARY NAIL INSERTION performed by Leah Musa DO at [facility] [address]    first knuckle right index finger   [address]    vertical banded gastroplasty    HEMORRHOID SURGERY      HIP SURGERY Left 01/30/2022     LEFT CEPHALOMEDULLARY NAIL INSERTION    HIP SURGERY Left 01/30/2022    LEFT CEPHALOMEDULLARY NAIL INSERTION (Left Hip)     JOINT REPLACEMENT  2004 & 2005    bilateral knees    KNEE SURGERY Left 01/30/2022    KNEE TOTAL ARTHROPLASTY REVISION, REMOVAL OF INTRAMEDULLARY NAIL     PACEMAKER INSERTION  04/09/2021    [place], placed by Dr. Janiya Jones EGD [address] N/A 08/16/2018    EGD FOREIGN BODY REMOVAL performed by Rk Hennessy MD at [facility] EGD TRANSORAL BIOPSY SINGLE/MULTIPLE N/A 05/25/2018    EGD BIOPSY performed by Sesar Teixeira DO at [address] Left 01/30/2022    KNEE TOTAL ARTHROPLASTY REVISION, REMOVAL OF INTRAMEDULLARY NAIL (Left     REVISION TOTAL KNEE ARTHROPLASTY Left 1/30/2022    KNEE TOTAL ARTHROPLASTY REVISION, REMOVAL OF INTRAMEDULLARY NAIL performed by Denver Hamburg, DO at [address]      left shoulder    UPPER GASTROINTESTINAL ENDOSCOPY  08/13/2018    removal of food bolus    UPPER GASTROINTESTINAL ENDOSCOPY N/A 08/13/2018    EGD FOREIGN BODY REMOVAL performed by Sesar Teixeira DO at [address] [place] [address] 08/13/2018    EGD BIOPSY performed by Sesar Teixeira DO at [address] [place] [address]  08/16/2018    egd with balloon dilitation    UPPER GASTROINTESTINAL ENDOSCOPY  08/16/2018    EGD DILATION BALLOON performed by Rk Hennessy MD at [address] [place] [address] 10/01/2018    EGD BIOPSY performed by Rosy Santacruz MD at [address] [place] [address]  10/01/2018    EGD DILATION BALLOON performed by Rosy Santacruz MD at [address] [place] [address] 11/19/2018    EGD DILATION BALLOON performed by Rosy Santacruz MD at [address] [place] [address] 10/15/2020    EGD SUBMUCOSAL/BOTOX INJECTION tattoo  performed by Arline Severino MD at [address] [place] [address] N/A 7/16/2021    EGD BIOPSY performed by Ladarius Daniel MD at [facility]       Medications:      metoprolol tartrate  25 mg Oral BID    docusate sodium  100 mg Oral Daily    cephALEXin  500 mg Oral 4x Daily  polyethylene glycol  17 g Oral BID    senna  1 tablet Oral Nightly    nystatin  5 mL Oral 4x Daily    enoxaparin  30 mg SubCUTAneous BID    acetaminophen  1,000 mg Oral 3 times per day    budesonide-formoterol  2 puff Inhalation BID    sodium chloride flush  5-40 mL IntraVENous 2 times per day    gabapentin  300 mg Oral Q8H    methocarbamol  750 mg Oral Q6H    bumetanide  2 mg Oral Daily    lipase-protease-amylase  24,000 Units Oral TID WC    rOPINIRole  0.25 mg Oral BID    pantoprazole  40 mg Oral QAM AC    tamsulosin  0.4 mg Oral Daily    ferrous sulfate  325 mg Oral Every Other Day       Social History:     Social History     Socioeconomic History    Marital status:      Spouse name: Not on file    Number of children: Not on file    Years of education: Not on file    Highest education level: Not on file   Occupational History    Not on file   Tobacco Use    Smoking status: Former Smoker     Packs/day: 1.00     Years: 20.00     Pack years: 20.00     Quit date: 1976     Years since quittin.1    Smokeless tobacco: Never Used   Vaping Use    Vaping Use: Never used   Substance and Sexual Activity    Alcohol use: No    Drug use: No    Sexual activity: Not on file   Other Topics Concern    Not on file   Social History Narrative    Not on file     Social Determinants of Health     Financial Resource Strain: Low Risk     Difficulty of Paying Living Expenses: Not hard at all   Food Insecurity: No Food Insecurity    Worried About Running Out of Food in the Last Year: Never true    [place] of Food in the Last Year: Never true   Transportation Needs:     Lack of Transportation (Medical): Not on file    Lack of Transportation (Non-Medical):  Not on file   Physical Activity:     Days of Exercise per Week: Not on file    Minutes of Exercise per Session: Not on file   Stress:     Feeling of Stress : Not on file   Social Connections:     Frequency of Communication with Friends and Family: Not on file    Frequency of Social Gatherings with Friends and Family: Not on file    Attends Restorationist Services: Not on file    Active Member of Clubs or Organizations: Not on file    Attends Club or Organization Meetings: Not on file    Marital Status: Not on file   Intimate Partner Violence:     Fear of Current or Ex-Partner: Not on file    Emotionally Abused: Not on file    Physically Abused: Not on file    Sexually Abused: Not on file   Housing Stability:     Unable to Pay for Housing in the Last Year: Not on file    Number of Places Lived in the Last Year: Not on file    Unstable Housing in the Last Year: Not on file       Family History:     Family History   Problem Relation Age of Onset    Cancer Mother     Heart Attack Father       Medical Decision Making:   I have independently reviewed/ordered the following labs:    CBC with Differential:   Recent Labs     02/02/22  0459 02/03/22  0541   WBC 5.9 5.7   HGB 7.2* 7.0*   HCT 22.3* 21.4*    121*   LYMPHOPCT 13* 13*   MONOPCT 16* 16*     BMP:  Recent Labs     02/01/22  0732 02/01/22  0732 02/02/22  0459 02/03/22  0541   *   < > 133* 130*   K 3.4*   < > 4.2 4.6   CL 95*   < > 99 98   CO2 24   < > 23 22   BUN 24*   < > 22 17   CREATININE 1.12   < > 0.80 0.65*   MG 1.7  --   --   --     < > = values in this interval not displayed. Hepatic Function Panel: No results for input(s): PROT, LABALBU, BILIDIR, IBILI, BILITOT, ALKPHOS, ALT, AST in the last 72 hours. No results for input(s): RPR in the last 72 hours. No results for input(s): HIV in the last 72 hours. No results for input(s): BC in the last 72 hours. Lab Results   Component Value Date    CREATININE 0.65 02/03/2022    GLUCOSE 96 02/03/2022    GLUCOSE 99 09/13/2011       Detailed results: Thank you for allowing us to participate in the care of this patient. Please call with questions.     This note is created with the assistance of a speech

## 2022-02-03 NOTE — PLAN OF CARE

## 2022-02-04 LAB
ABSOLUTE EOS #: 0.3 K/UL (ref 0–0.44)
ABSOLUTE IMMATURE GRANULOCYTE: 0.05 K/UL (ref 0–0.3)
ABSOLUTE LYMPH #: 1.04 K/UL (ref 1.1–3.7)
ABSOLUTE MONO #: 0.99 K/UL (ref 0.1–1.2)
BASOPHILS # BLD: 1 % (ref 0–2)
BASOPHILS ABSOLUTE: 0.04 K/UL (ref 0–0.2)
DIFFERENTIAL TYPE: ABNORMAL
EOSINOPHILS RELATIVE PERCENT: 6 % (ref 1–4)
HCT VFR BLD CALC: 22.5 % (ref 40.7–50.3)
HEMOGLOBIN: 7.2 G/DL (ref 13–17)
IMMATURE GRANULOCYTES: 1 %
LYMPHOCYTES # BLD: 20 % (ref 24–43)
MCH RBC QN AUTO: 31.2 PG (ref 25.2–33.5)
MCHC RBC AUTO-ENTMCNC: 32 G/DL (ref 28.4–34.8)
MCV RBC AUTO: 97.4 FL (ref 82.6–102.9)
MONOCYTES # BLD: 19 % (ref 3–12)
NRBC AUTOMATED: 0 PER 100 WBC
PDW BLD-RTO: 14.2 % (ref 11.8–14.4)
PLATELET # BLD: 190 K/UL (ref 138–453)
PLATELET ESTIMATE: ABNORMAL
PMV BLD AUTO: 9.9 FL (ref 8.1–13.5)
RBC # BLD: 2.31 M/UL (ref 4.21–5.77)
RBC # BLD: ABNORMAL 10*6/UL
SEG NEUTROPHILS: 53 % (ref 36–65)
SEGMENTED NEUTROPHILS ABSOLUTE COUNT: 2.74 K/UL (ref 1.5–8.1)
WBC # BLD: 5.2 K/UL (ref 3.5–11.3)
WBC # BLD: ABNORMAL 10*3/UL

## 2022-02-04 PROCEDURE — 97530 THERAPEUTIC ACTIVITIES: CPT

## 2022-02-04 PROCEDURE — 36415 COLL VENOUS BLD VENIPUNCTURE: CPT

## 2022-02-04 PROCEDURE — 85025 COMPLETE CBC W/AUTO DIFF WBC: CPT

## 2022-02-04 PROCEDURE — 2580000003 HC RX 258: Performed by: NURSE PRACTITIONER

## 2022-02-04 PROCEDURE — 2580000003 HC RX 258: Performed by: STUDENT IN AN ORGANIZED HEALTH CARE EDUCATION/TRAINING PROGRAM

## 2022-02-04 PROCEDURE — 6360000002 HC RX W HCPCS

## 2022-02-04 PROCEDURE — 1200000000 HC SEMI PRIVATE

## 2022-02-04 PROCEDURE — 6370000000 HC RX 637 (ALT 250 FOR IP): Performed by: STUDENT IN AN ORGANIZED HEALTH CARE EDUCATION/TRAINING PROGRAM

## 2022-02-04 PROCEDURE — 6360000002 HC RX W HCPCS: Performed by: STUDENT IN AN ORGANIZED HEALTH CARE EDUCATION/TRAINING PROGRAM

## 2022-02-04 PROCEDURE — 99232 SBSQ HOSP IP/OBS MODERATE 35: CPT | Performed by: NURSE PRACTITIONER

## 2022-02-04 PROCEDURE — 6370000000 HC RX 637 (ALT 250 FOR IP): Performed by: HEALTH CARE PROVIDER

## 2022-02-04 PROCEDURE — 97116 GAIT TRAINING THERAPY: CPT

## 2022-02-04 RX ORDER — SODIUM CHLORIDE, SODIUM LACTATE, POTASSIUM CHLORIDE, AND CALCIUM CHLORIDE .6; .31; .03; .02 G/100ML; G/100ML; G/100ML; G/100ML
1000 INJECTION, SOLUTION INTRAVENOUS ONCE
Status: COMPLETED | OUTPATIENT
Start: 2022-02-04 | End: 2022-02-04

## 2022-02-04 RX ORDER — ONDANSETRON 2 MG/ML
4 INJECTION INTRAMUSCULAR; INTRAVENOUS ONCE
Status: COMPLETED | OUTPATIENT
Start: 2022-02-04 | End: 2022-02-04

## 2022-02-04 RX ADMIN — CEPHALEXIN 500 MG: 500 CAPSULE ORAL at 09:47

## 2022-02-04 RX ADMIN — FERROUS SULFATE TAB EC 325 MG (65 MG FE EQUIVALENT) 325 MG: 325 (65 FE) TABLET DELAYED RESPONSE at 09:47

## 2022-02-04 RX ADMIN — Medication 500000 UNITS: at 21:34

## 2022-02-04 RX ADMIN — GABAPENTIN 300 MG: 300 CAPSULE ORAL at 06:20

## 2022-02-04 RX ADMIN — POLYETHYLENE GLYCOL 3350 17 G: 17 POWDER, FOR SOLUTION ORAL at 21:34

## 2022-02-04 RX ADMIN — RIVAROXABAN 20 MG: 20 TABLET, FILM COATED ORAL at 17:45

## 2022-02-04 RX ADMIN — ACETAMINOPHEN 1000 MG: 325 TABLET ORAL at 13:05

## 2022-02-04 RX ADMIN — PANTOPRAZOLE SODIUM 40 MG: 40 TABLET, DELAYED RELEASE ORAL at 06:20

## 2022-02-04 RX ADMIN — CEPHALEXIN 500 MG: 500 CAPSULE ORAL at 17:00

## 2022-02-04 RX ADMIN — ROPINIROLE HYDROCHLORIDE 0.25 MG: 0.25 TABLET, FILM COATED ORAL at 21:34

## 2022-02-04 RX ADMIN — ENOXAPARIN SODIUM 30 MG: 100 INJECTION SUBCUTANEOUS at 09:47

## 2022-02-04 RX ADMIN — METHOCARBAMOL TABLETS 750 MG: 750 TABLET, COATED ORAL at 13:03

## 2022-02-04 RX ADMIN — POLYETHYLENE GLYCOL 3350 17 G: 17 POWDER, FOR SOLUTION ORAL at 09:46

## 2022-02-04 RX ADMIN — GABAPENTIN 300 MG: 300 CAPSULE ORAL at 14:15

## 2022-02-04 RX ADMIN — Medication 500000 UNITS: at 16:18

## 2022-02-04 RX ADMIN — METHOCARBAMOL TABLETS 750 MG: 750 TABLET, COATED ORAL at 17:46

## 2022-02-04 RX ADMIN — BUDESONIDE AND FORMOTEROL FUMARATE DIHYDRATE 2 PUFF: 160; 4.5 AEROSOL RESPIRATORY (INHALATION) at 21:35

## 2022-02-04 RX ADMIN — SODIUM CHLORIDE, PRESERVATIVE FREE 20 ML: 5 INJECTION INTRAVENOUS at 21:35

## 2022-02-04 RX ADMIN — PANCRELIPASE 24000 UNITS: 24000; 76000; 120000 CAPSULE, DELAYED RELEASE PELLETS ORAL at 13:03

## 2022-02-04 RX ADMIN — GABAPENTIN 300 MG: 300 CAPSULE ORAL at 21:33

## 2022-02-04 RX ADMIN — SENNOSIDES 8.6 MG: 8.6 TABLET, COATED ORAL at 21:34

## 2022-02-04 RX ADMIN — CEPHALEXIN 500 MG: 500 CAPSULE ORAL at 21:33

## 2022-02-04 RX ADMIN — ROPINIROLE HYDROCHLORIDE 0.25 MG: 0.25 TABLET, FILM COATED ORAL at 09:47

## 2022-02-04 RX ADMIN — BUMETANIDE 2 MG: 1 TABLET ORAL at 09:46

## 2022-02-04 RX ADMIN — ACETAMINOPHEN 1000 MG: 325 TABLET ORAL at 21:33

## 2022-02-04 RX ADMIN — Medication 500000 UNITS: at 09:47

## 2022-02-04 RX ADMIN — METHOCARBAMOL TABLETS 750 MG: 750 TABLET, COATED ORAL at 00:08

## 2022-02-04 RX ADMIN — DOCUSATE SODIUM 100 MG: 100 CAPSULE ORAL at 09:46

## 2022-02-04 RX ADMIN — SODIUM CHLORIDE, PRESERVATIVE FREE 10 ML: 5 INJECTION INTRAVENOUS at 09:50

## 2022-02-04 RX ADMIN — SODIUM CHLORIDE, POTASSIUM CHLORIDE, SODIUM LACTATE AND CALCIUM CHLORIDE 1000 ML: 600; 310; 30; 20 INJECTION, SOLUTION INTRAVENOUS at 13:02

## 2022-02-04 RX ADMIN — PANCRELIPASE 24000 UNITS: 24000; 76000; 120000 CAPSULE, DELAYED RELEASE PELLETS ORAL at 09:48

## 2022-02-04 RX ADMIN — ACETAMINOPHEN 1000 MG: 325 TABLET ORAL at 06:20

## 2022-02-04 RX ADMIN — CEPHALEXIN 500 MG: 500 CAPSULE ORAL at 13:04

## 2022-02-04 RX ADMIN — METHOCARBAMOL TABLETS 750 MG: 750 TABLET, COATED ORAL at 06:20

## 2022-02-04 RX ADMIN — PANCRELIPASE 24000 UNITS: 24000; 76000; 120000 CAPSULE, DELAYED RELEASE PELLETS ORAL at 16:17

## 2022-02-04 RX ADMIN — Medication 500000 UNITS: at 13:04

## 2022-02-04 RX ADMIN — TAMSULOSIN HYDROCHLORIDE 0.4 MG: 0.4 CAPSULE ORAL at 09:49

## 2022-02-04 RX ADMIN — ONDANSETRON 4 MG: 2 INJECTION, SOLUTION INTRAMUSCULAR; INTRAVENOUS at 13:04

## 2022-02-04 ASSESSMENT — PAIN SCALES - GENERAL
PAINLEVEL_OUTOF10: 7
PAINLEVEL_OUTOF10: 0
PAINLEVEL_OUTOF10: 3
PAINLEVEL_OUTOF10: 0
PAINLEVEL_OUTOF10: 0
PAINLEVEL_OUTOF10: 7
PAINLEVEL_OUTOF10: 7

## 2022-02-04 ASSESSMENT — ENCOUNTER SYMPTOMS
SHORTNESS OF BREATH: 1
DIARRHEA: 0
VOMITING: 0
ABDOMINAL DISTENTION: 0
APNEA: 0
EYE DISCHARGE: 0
COUGH: 0
COLOR CHANGE: 0
NAUSEA: 0

## 2022-02-04 ASSESSMENT — PAIN DESCRIPTION - ORIENTATION: ORIENTATION: LEFT

## 2022-02-04 ASSESSMENT — PAIN DESCRIPTION - PAIN TYPE: TYPE: ACUTE PAIN;SURGICAL PAIN

## 2022-02-04 ASSESSMENT — PAIN DESCRIPTION - DESCRIPTORS: DESCRIPTORS: ACHING;DISCOMFORT

## 2022-02-04 ASSESSMENT — PAIN DESCRIPTION - LOCATION: LOCATION: KNEE

## 2022-02-04 NOTE — PROGRESS NOTES
PROGRESS NOTE          PATIENT NAME: Skyla Doing RECORD NO. [id number]  DATE: 2/4/2022  PRIMARY CARE PHYSICIAN: Donnell Leonard, APRN - CNP    HD: # 6    ASSESSMENT    Patient Active Problem List   Diagnosis    Chronic atrial fibrillation ([address]    Dyslipidemia    Gastroesophageal reflux disease without esophagitis    Osteoarthritis of lumbar spine    OA (osteoarthritis) of knee, bilateral    Foot drop, right    Hallux valgus, acquired, bilateral    Hearing impairment    Essential hypertension    Slow transit constipation    MARIAMA on CPAP    COPD (chronic obstructive pulmonary disease) (HCC)    Chronic diastolic heart failure (HCC)    History of bariatric surgery including banding leading to esophageal stricture and aspiration    BPH (benign prostatic hyperplasia)    Myalgia    Atrial fibrillation with slow ventricular response ([address]    Pacemaker pocket hematoma, initial encounter    Pulmonary hypertension ([address]    Presence of permanent cardiac pacemaker    Fall at home, initial encounter    Left displaced femoral neck fracture ([address]    Retained orthopedic hardware    COVID-19    Elevated C-reactive protein (CRP)       MEDICAL DECISION MAKING AND PLAN    Neuro -MMPT -tylenol, gabapentin, robaxin, home requip.  More muscle spasm at knee this am.   CV -BP remains systolic 22-010E, asymptomatic  Heme: post op Hg drop w/hematoma, s/p 1 unit prbc on 1/31, hemoglobin low 7's the last 3 days. 7.2 and stable this AM.  Cont to monitor leg for re-bleed. Lovenox 30 mg BID has been restarted yesterday.  Lower extremity doppler was negative for DVT on 2/2. Prevena on left knee, c/d/i. Continue to monitor output. Restart xarelto today. Pulm -encourage incentive spirometry, 2 L nasal cannula for COPD, Satting well without difficulty breathing. Renal -voiding spontaneously. Cont daily labs, encourage po intake. GI -regular diet  ID -infectious disease on board for Covid.  Received Sotrivimab . Placed on Keflex by ortho on . Recommend 14 day course due to continued drainage in the left knee after surgery and history of left TKA. Endo- glucose less than 180  MSK-status post left femur CMN, weightbearing as tolerated left lower extremity, Encourage PT/OT  Dispo - COVID isolation on  and PT OT discharge planning. Chief Complaint: [address] with right hip IT fracture s/p right femoral HWR and CMN insertion    SUBJECTIVE    Min Salinas seen and examined at bedside this morning. This morning complains of some dizziness with standing. Still having pain in LLE. Notes muscle spasms in the knee today. No acute issues overnight per nursing. Denies: fever/chills, HA, CP, SOB, N/V, dysuria, or numbness/tingling in extremities. No output from Prevena overnight. OBJECTIVE  VITALS: Temp: Temp: 98.8 °F (37.1 °C)Temp  Av.7 °F (37.1 °C)  Min: 98.2 °F (36.8 °C)  Max: 99 °F (28.5 °C) BP Systolic (01UXV), LCA:340 , Min:96 , BCR:990   Diastolic (65LOC), AH, Min:53, Max:61   Pulse Pulse  Av.8  Min: 69  Max: 82 Resp Resp  Avg: 15.5  Min: 13  Max: 19 Pulse ox SpO2  Av.7 %  Min: 95 %  Max: 99 %  CONSTITUTIONAL: awake, alert, cooperative, no apparent distress  HEAD: atraumatic, normocephalic  EYES: sclera clear, pupils equal and reactive to light  ENT: ears are symmetric, nares patent   HEENT: moist mucous membranes  NECK: Supple, symmetrical, trachea midline  LUNGS: no respiratory distress, no audible wheezing, no chest wall tenderness, AICD palpable in left chest  CARDIOVASCULAR: Regular rate and rhythm  ABDOMEN: soft, nontender, nondistended, no guarding, no rebound tenderness   MUSCULOSKELETAL: LLE: Prevena on, with sponge suctioned down and no pooling of blood under sponge. Fredis Amin is c/d/i. No sign of strike through bleeding. Dressings at the hip c/d/i. Appropriate tenderness to palpation about the hip and knee. Toleration 30 degrees of passive motion at the knee. EHL/FHL/TA/GSC gross motor intact. Sensation intact to light touch. DP/PT pulse 2+. Joan Bridget, alert, oriented to name, place and time  EXTREMITIES: peripheral pulses normal, no pedal edema  SKIN: normal coloration and turgor    I/O last 3 completed shifts: In: 240 [P.O.:240]  Out: 1400 [Urine:1400]    Drain/tube output: In: -   Out: 600 [Urine:600]    LAB:  CBC:   Recent Labs     02/02/22  0459 02/03/22  0541 02/04/22  0500   WBC 5.9 5.7 5.2   HGB 7.2* 7.0* 7.2*   HCT 22.3* 21.4* 22.5*   MCV 95.3 96.8 97.4    121* 190     BMP:   Recent Labs     02/01/22  0732 02/02/22  0459 02/03/22  0541   * 133* 130*   K 3.4* 4.2 4.6   CL 95* 99 98   CO2 24 23 22   BUN 24* 22 17   CREATININE 1.12 0.80 0.65*   GLUCOSE 89 95 96     COAGS: No results for input(s): APTT, PROT, INR in the last 72 hours. RADIOLOGY:  None taken today      Dhara Kramer DO  2/4/22, 7:14 AM               Trauma Attending Attestation      I have reviewed the above GCS note(s) and confirmed the key elements of the medical history and physical exam. I have seen and examined the pt. I have discussed the findings, established the care plan and recommendations with Resident, GCS RN, bedside nurse.   Right IT fracture s/p repair   COVID   Some mild anemia   Work with PT/OT         Kina Jones DO  2/7/2022  12:39 PM

## 2022-02-04 NOTE — CARE COORDINATION
Transitional planning. Received call from [facility] at [facility] and they are awaiting precert. It was started yesterday. [facility] calls [facility] and they denied. Dr Derek Rivas is reviewing chart to see if she wants to do peer to peer. Patricia Mendoza will let me know. Most likely need SNF choices. [address] Derek Rivas does not see the clinical info needed to probably get a peer to peer approved  . [phone] with pt over the phone and aware of denial. He said to call his wife. Called wife and spoke with daughter Alicia Braxton. She chooses [address], Nick Dominic [facility] Referrals to [address] and spoke with Anahi. OON but she will check if any OON benefits. [address] and left message in admissions.     [address] and left VM in admissions

## 2022-02-04 NOTE — PROGRESS NOTES
Comprehensive Nutrition Assessment    Type and Reason for Visit:  Reassess    Nutrition Recommendations/Plan: Continue current Easy [employer] with Ensure Enlive oral supplements at all meals. Encourage/monitor PO intakes as tolerated. Will monitor labs, weights, and plan of care. Nutrition Assessment:  Noted diet has been modified to an [employer]. Pt reports he has been eating some of his meals but reports having nausea. Spoke to pt on phone d/t isolation (pt with Covid-19). Pt eating some of his breakfast tray and has been drinking some of the Ensures at meals. Consuming less than 50% of meals. Reports appetite has been decreased. Last BM 2/2. Labs reviewed. Meds include: Colace, Creon, Zofran, Glycolax, Senokot. Malnutrition Assessment:  Malnutrition Status: Moderate malnutrition    Context:  Chronic Illness     Findings of the 6 clinical characteristics of malnutrition:  Energy Intake:  Mild decrease in energy intake  Weight Loss:  7 - Greater than 7.5% over 3 months     Body Fat Loss:  1 - Mild body fat loss Orbital,Buccal region   Muscle Mass Loss:   (Moderate muscle mass loss) Clavicles (pectoralis & deltoids)  Fluid Accumulation:  1 - Mild (to Moderate) Extremities   Strength:  Not Performed    Estimated Daily Nutrient Needs:  Energy (kcal):  25 kcal/kg = 5096-9763 kcals/day; Weight Used for Energy Requirements:  Current     Protein (g):  90-115gm pro/d (1.2-1.5gm/kg); Weight Used for Protein Requirements:  Current        Fluid (ml/day):  1900ml/d (25ml/kg); Method Used for Fluid Requirements:  ml/Kg      Nutrition Related Findings:  labs/meds reviewed. last BM 2/2.       Wounds:  Stage II,Pressure Injury (to coccyx; multiple surgical incisions)       Current Nutrition Therapies:    ADULT ORAL NUTRITION SUPPLEMENT; Breakfast, Lunch, Dinner; Standard High Calorie/High Protein Oral Supplement  ADULT DIET; Easy to Chew    Anthropometric Measures:  · Height: 5' 10\" (177.8 cm)  · Current Body Weight: 170 lb 14.4 oz (77.5 kg)   · Admission Body Weight: 170 lb 13.7 oz (77.5 kg)    · Usual Body Weight: 193 lb 9.6 oz (87.8 kg) (11/10/21 bed scale per chart review)     · Ideal Body Weight: 166 lbs; % Ideal Body Weight 103 %   · BMI: 24.5  · BMI Categories: Normal Weight (BMI 18.5-24. 9)       Nutrition Diagnosis:   · Increased nutrient needs related to increase demand for energy/nutrients as evidenced by wounds    · Inadequate oral intake related to  (decreased appetite) as evidenced by intake 0-25%,intake 26-50% (variable PO intakes; need for ONS)    Nutrition Interventions:   Food and/or Nutrient Delivery:  Continue Current Diet,Continue Oral Nutrition Supplement  Nutrition Education/Counseling:  No recommendation at this time   Coordination of Nutrition Care:  Continue to monitor while inpatient    Goals:  Meet 50% or greater of estimated nutrition needs       Nutrition Monitoring and Evaluation:   Behavioral-Environmental Outcomes:  None Identified   Food/Nutrient Intake Outcomes:  Food and Nutrient Intake,Supplement Intake  Physical Signs/Symptoms Outcomes:  Biochemical Data,GI Status,Fluid Status or Edema,Hemodynamic Status,Nutrition Focused Physical Findings,Skin,Weight     Discharge Planning:     Too soon to determine     Electronically signed by Jalen Garland RD, LD on 2/4/22 at 12:02 PM EST    Contact: [phone]

## 2022-02-04 NOTE — PRE-CERTIFICATION NOTE
Received call from [address] at [facility], intent to deny inpatient rehabilitation admission lack of support for meeting criteria for IRF admission. Option for peer to peer offered with deadline for completion 02/07/2022 2:00 pm ([phone], extension [phone] - updated clinicals can be faxed to [phone]). Notified Dr. Mohini Kinney awaiting determination for peer to peer scheduling. Jill Montenegro CM at this time.

## 2022-02-04 NOTE — PROGRESS NOTES
[facility] -   Infectious diseases evaluation  admission date 1/28/2022    reason for consultation:   covid    Impression :   Current:  · Left hip intertrochanteric fracture after a fall  ·      1/30  S/p Removal of deep orthopedic hardware left femur,                                Cephalomedullary  nail fixation left intertrochanteric femur fracture. · Covid positive, no hypoxemia  · Elevated CRP  · Right lower lobe atelectatic lesions, less likely Covid related  · Underlying COPD  Other:  ·   Discussion / summary of stay / plan of care   ·   Recommendations   · Low  CRP and ferritin  · sotrivimab for asymptomatic Covid in a high risk patient. Received 1/29. · 2 L NC for copd  · 2/1 O2 at 2 L NC which is baseline. Doing well. Okay to discharge from ID standpoint. · 2/4 Prophylactic Keflex x 14 days until 2/15/22 per Ortho. Isolate until 2/7/22  Infection Control Recommendations   · [place] Precautions  · Contact Isolation   · Airborne isolation  · Droplet Isolation      Antimicrobial Stewardship Recommendations   · Off ab    Coordination ofOutpatient Care:   · Estimated Length of IV antimicrobials:  · Patient will need Midline / picc Catheter Insertion:   · Patient will need SNF:  · Patient will need outpatient wound care:     History of Present Illness:   Initial history:  Bronwyn Winn is a 77y.o.-year-old male fell and broke his hip, left intertrochanteric, Ortho on board planning surgical repair. , found to have Covid positive, underlying COPD  Infectious consulted due to Covid positive    Patient on no oxygen  CT scan of the chest reviewed personally, right lower lobe small atelectasis areas, but cannot rule out Covid related-those lesions seem to be isolated in that area, possibly suggestive of atelectasis    Also has A. fib, and has a pacemaker, EF 50 and no valvular disease      1/31   Afebrile. SpO2 100% on 4 L NC.   Transfused with 1 unit Head: Normocephalic and atraumatic. Right Ear: External ear normal.      Left Ear: External ear normal.      Nose: Nose normal.      Mouth/Throat:      Mouth: Mucous membranes are moist.      Pharynx: Oropharynx is clear. Eyes:      General: No scleral icterus. Extraocular Movements: Extraocular movements intact. Conjunctiva/sclera: Conjunctivae normal.      Pupils: Pupils are equal, round, and reactive to light. Cardiovascular:      Rate and Rhythm: Normal rate and regular rhythm. Heart sounds: Normal heart sounds. No murmur heard. No friction rub. Pulmonary:      Effort: Pulmonary effort is normal. No respiratory distress. Breath sounds: No stridor. No rhonchi. Comments: Diminished t/o. Abdominal:      General: Bowel sounds are normal. There is no distension. Palpations: Abdomen is soft. There is no mass. Tenderness: There is no abdominal tenderness. Hernia: No hernia is present. Genitourinary:     Comments: No concepcion. Musculoskeletal:         General: No swelling or deformity. Cervical back: Normal range of motion and neck supple. No rigidity. Skin:     General: Skin is warm and dry. Capillary Refill: Capillary refill takes less than 2 seconds. Coloration: Skin is not jaundiced. Neurological:      Mental Status: He is alert and oriented to person, place, and time. Psychiatric:         Mood and Affect: Mood normal.         Behavior: Behavior normal.         Thought Content:  Thought content normal.         Past Medical History:     Past Medical History:   Diagnosis Date    Acute superficial gastritis without hemorrhage 05/26/2018    Anastomotic stricture of stomach     Asthma     Atrial fibrillation ([facility]     On Xarelto 6/27/2020    Calculus of gallbladder without cholecystitis without obstruction 05/02/2017    Chronic diastolic heart failure ([address] 11/17/2017    Chronic rhinosinusitis 04/13/2015    Class 2 severe obesity due to excess calories with serious comorbidity and body mass index (BMI) of 36.0 to 36.9 in adult [facility]) 11/17/2017    COPD (chronic obstructive pulmonary disease) (HCC)     E. coli septicemia ([facility] [address]     E. coli UTI     Foot drop, right 07/10/2012    Fracture of femur ([facility] 10/23/2016    Gait disorder 07/20/2016    Gastric out let obstruction     GERD (gastroesophageal reflux disease)     Hallux valgus, acquired, bilateral 06/24/2015    Hyperlipidemia     Hypertension     Impaired hearing 04/13/2015    Internal hemorrhoids 01/03/2017    Lymphedema of both lower extremities 06/24/2015    Nausea & vomiting 11/16/2018    OA (osteoarthritis) of knee, bilateral 12/11/2006    Obesity     MARIAMA on CPAP 05/02/2017    Osteoarthritis     Osteoarthritis of lumbar spine 12/13/2007     replace inactive diagnosis    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 coli (HCC)     Due to UTI     Sick sinus syndrome (HCC)     Simple chronic bronchitis (HCC) 04/10/2018    Slow transit constipation 11/03/2016    Unspecified sleep apnea     UTI (urinary tract infection)        Past Surgical  History:     Past Surgical History:   Procedure Laterality Date    CARPAL TUNNEL RELEASE      bilateral    COLONOSCOPY      COLONOSCOPY N/A 04/05/2021    COLONOSCOPY DIAGNOSTIC performed by Dalton Landaverde MD at [address] [place] [address] Road  01/02/2019    by Dr. Aaliyah Caballero N/A 01/02/2019    CYSTOSCOPY performed by Marlon Hooper MD at [address] [place] [address] Drive Left 1/30/2022    LEFT CEPHALOMEDULLARY NAIL INSERTION performed by Priti Suarez DO at [facility] [address]    first knuckle right index finger    GASTRIC BYPASS SURGERY  1985    vertical banded gastroplasty    HEMORRHOID SURGERY      HIP SURGERY Left 01/30/2022     LEFT CEPHALOMEDULLARY NAIL INSERTION    HIP SURGERY Left 01/30/2022    LEFT CEPHALOMEDULLARY NAIL INSERTION (Left Hip)     JOINT REPLACEMENT 2004 & 2005    bilateral knees    KNEE SURGERY Left 01/30/2022    KNEE TOTAL ARTHROPLASTY REVISION, REMOVAL OF INTRAMEDULLARY NAIL     PACEMAKER INSERTION  04/09/2021    [place], placed by Dr. Adriana Watson EGD [address] [facility] [address] N/A 08/16/2018    EGD FOREIGN BODY REMOVAL performed by Livan Allred MD at [address] EGD TRANSORAL BIOPSY SINGLE/MULTIPLE N/A 05/25/2018    EGD BIOPSY performed by Jina De La O DO at [address] Left 01/30/2022    KNEE TOTAL ARTHROPLASTY REVISION, REMOVAL OF INTRAMEDULLARY NAIL (Left     REVISION TOTAL KNEE ARTHROPLASTY Left 1/30/2022    KNEE TOTAL ARTHROPLASTY REVISION, REMOVAL OF INTRAMEDULLARY NAIL performed by Irina Hernandez DO at [address]      left shoulder    UPPER GASTROINTESTINAL ENDOSCOPY  08/13/2018    removal of food bolus    UPPER GASTROINTESTINAL ENDOSCOPY N/A 08/13/2018    EGD FOREIGN BODY REMOVAL performed by Jina De La O DO at [address] [place] [address] N/A 08/13/2018    EGD BIOPSY performed by Jina De La O DO at [address] [place] [address]  08/16/2018    egd with balloon dilitation    UPPER GASTROINTESTINAL ENDOSCOPY  08/16/2018    EGD DILATION BALLOON performed by Livan Allred MD at [address] [place] [address] 10/01/2018    EGD BIOPSY performed by Arun Young MD at [address] [place] [address]  10/01/2018    EGD DILATION BALLOON performed by Arun Young MD at [address] [place] [address] 11/19/2018    EGD DILATION BALLOON performed by Arun Young MD at [address] [place] [address] 10/15/2020    EGD SUBMUCOSAL/BOTOX INJECTION tattoo  performed by Anh Ely MD at [address] [place] [address] N/A 7/16/2021    EGD BIOPSY performed by Kvng Chavez MD at [facility]       Medications:      metoprolol tartrate  25 mg Oral BID    docusate sodium  100 mg Oral Daily    cephALEXin  500 mg Oral 4x Daily    polyethylene glycol  17 g Oral BID    senna  1 tablet Oral Nightly    nystatin  5 mL Oral 4x Daily    enoxaparin  30 mg SubCUTAneous BID    acetaminophen  1,000 mg Oral 3 times per day    budesonide-formoterol  2 puff Inhalation BID    sodium chloride flush  5-40 mL IntraVENous 2 times per day    gabapentin  300 mg Oral Q8H    methocarbamol  750 mg Oral Q6H    bumetanide  2 mg Oral Daily    lipase-protease-amylase  24,000 Units Oral TID WC    rOPINIRole  0.25 mg Oral BID    pantoprazole  40 mg Oral QAM AC    tamsulosin  0.4 mg Oral Daily    ferrous sulfate  325 mg Oral Every Other Day       Social History:     Social History     Socioeconomic History    Marital status:      Spouse name: Not on file    Number of children: Not on file    Years of education: Not on file    Highest education level: Not on file   Occupational History    Not on file   Tobacco Use    Smoking status: Former Smoker     Packs/day: 1.00     Years: 20.00     Pack years: 20.00     Quit date: 1976     Years since quittin.1    Smokeless tobacco: Never Used   Vaping Use    Vaping Use: Never used   Substance and Sexual Activity    Alcohol use: No    Drug use: No    Sexual activity: Not on file   Other Topics Concern    Not on file   Social History Narrative    Not on file     Social Determinants of Health     Financial Resource Strain: Low Risk     Difficulty of Paying Living Expenses: Not hard at all   Food Insecurity: No Food Insecurity    Worried About Running Out of Food in the Last Year: Never true    [place] of Food in the Last Year: Never true   Transportation Needs:     Lack of Transportation (Medical): Not on file    Lack of Transportation (Non-Medical):  Not on file   Physical Activity:     Days of Exercise per Week: Not on file    Minutes of Exercise per Session: Not on file Stress:     Feeling of Stress : Not on file   Social Connections:     Frequency of Communication with Friends and Family: Not on file    Frequency of Social Gatherings with Friends and Family: Not on file    Attends Voodoo Services: Not on file    Active Member of [address] [place] [address] [employer] or Organizations: Not on file    Attends Club or Organization Meetings: Not on file    Marital Status: Not on file   Intimate Partner Violence:     Fear of Current or Ex-Partner: Not on file    Emotionally Abused: Not on file    Physically Abused: Not on file    Sexually Abused: Not on file   Housing Stability:     Unable to Pay for Housing in the Last Year: Not on file    Number of [place] in the Last Year: Not on file    Unstable Housing in the Last Year: Not on file       Family History:     Family History   Problem Relation Age of Onset    Cancer Mother     Heart Attack Father       Medical Decision Making:   I have independently reviewed/ordered the following labs:    CBC with Differential:   Recent Labs     02/03/22  0541 02/04/22  0500   WBC 5.7 5.2   HGB 7.0* 7.2*   HCT 21.4* 22.5*   * 190   LYMPHOPCT 13* 20*   MONOPCT 16* 19*     BMP:  Recent Labs     02/02/22  0459 02/03/22  0541   * 130*   K 4.2 4.6   CL 99 98   CO2 23 22   BUN 22 17   CREATININE 0.80 0.65*     Hepatic Function Panel: No results for input(s): PROT, LABALBU, BILIDIR, IBILI, BILITOT, ALKPHOS, ALT, AST in the last 72 hours. No results for input(s): RPR in the last 72 hours. No results for input(s): HIV in the last 72 hours. No results for input(s): BC in the last 72 hours. Lab Results   Component Value Date    CREATININE 0.65 02/03/2022    GLUCOSE 96 02/03/2022    GLUCOSE 99 09/13/2011       Detailed results: Thank you for allowing us to participate in the care of this patient. Please call with questions.     This note is created with the assistance of a speech recognition program.  While intending to generate adocument that actually reflects the content of the visit, the document can still have some errors including those of syntax and sound a like substitutions which may escape proof reading. It such instances, actual meaningcan be extrapolated by contextual diversion.     JULIAN Flores - CNP  Office: [phone]  Perfect serve / office [phone]

## 2022-02-04 NOTE — PROGRESS NOTES
Physical Therapy  Facility/Department: [facility] CAR 2  Daily Treatment Note  NAME: Cathleen Stewart  : 1945  MRN: [id number]    Date of Service: 2022    Discharge Recommendations:  Patient would benefit from continued therapy after discharge        Assessment   Body structures, Functions, Activity limitations: Decreased functional mobility ; Decreased posture;Decreased endurance;Decreased ROM; Decreased strength;Decreased balance; Increased pain  Assessment: Pt required maxA with HOB elevated with increased time/effort. Sit<->stand transfers with modA x 2 with max verbal cues to maintain upright posture. Amb ~3 steps from bed->commode->recliner with modA x 2 with max verbal cues to maintain upright posture. Pt able to tolerate weight bearing through LLE, but requires physical assistance advancing LLE during ambulation. Pt would be unsafe to return to prior living arrangements upon discharge. Pt would benefit from additional intense PT to address deficits. Prognosis: Good  PT Education: Goals;PT Role;General Safety;Weight-bearing Education;Plan of Care  Patient Education: Pt stated he is performing B ankle pumps, quad sets, glut sets t/o the day. REQUIRES PT FOLLOW UP: Yes  Activity Tolerance  Activity Tolerance: Patient limited by endurance; Patient limited by pain;Treatment limited secondary to medical complications (free text)  Activity Tolerance: Pt easily fatigued this day with exercises; needed frequent rest periods. Patient Diagnosis(es): The encounter diagnosis was Left displaced femoral neck fracture ([address]      has a past medical history of Acute superficial gastritis without hemorrhage, Anastomotic stricture of stomach, Asthma, Atrial fibrillation ([address] Calculus of gallbladder without cholecystitis without obstruction, Chronic diastolic heart failure (HCC), Chronic rhinosinusitis, Class 2 severe obesity due to excess calories with serious comorbidity and body mass index (BMI) of 36.0 to 36.9 in adult [facility]), COPD (chronic obstructive pulmonary disease) ([facility] [address] [facility] E. coli septicemia ([address] E. coli UTI, Foot drop, right, Fracture of femur ([address] Gait disorder, Gastric out let obstruction, GERD (gastroesophageal reflux disease), Hallux valgus, acquired, bilateral, Hyperlipidemia, Hypertension, Impaired hearing, Internal hemorrhoids, Lymphedema of both lower extremities, Nausea & vomiting, OA (osteoarthritis) of knee, bilateral, Obesity, MARIAMA on CPAP, Osteoarthritis, Osteoarthritis of lumbar spine, S/P revision of total knee, Septicemia due to E. coli ([address] Sick sinus syndrome ([address] Simple chronic bronchitis (HCC), Slow transit constipation, Unspecified sleep apnea, and UTI (urinary tract infection). has a past surgical history that includes Finger amputation (1966); Gastric bypass surgery (1985); Carpal tunnel release; Colonoscopy; Rotator cuff repair; joint replacement (2004 & 2005); Hemorrhoid surgery; pr egd transoral biopsy single/multiple (N/A, 05/25/2018); Upper gastrointestinal endoscopy (08/13/2018); Upper gastrointestinal endoscopy (N/A, 08/13/2018); Upper gastrointestinal endoscopy (N/A, 08/13/2018); Upper gastrointestinal endoscopy (08/16/2018); pr egd flexible foreign body removal (N/A, 08/16/2018); Upper gastrointestinal endoscopy (08/16/2018); Upper gastrointestinal endoscopy (N/A, 10/01/2018); Upper gastrointestinal endoscopy (10/01/2018); Upper gastrointestinal endoscopy (N/A, 11/19/2018); Cystoscopy (01/02/2019); Cystoscopy (N/A, 01/02/2019); Upper gastrointestinal endoscopy (N/A, 10/15/2020); Colonoscopy (N/A, 04/05/2021); Pacemaker insertion (04/09/2021); Upper gastrointestinal endoscopy (N/A, 7/16/2021); hip surgery (Left, 01/30/2022); knee surgery (Left, 01/30/2022); hip surgery (Left, 01/30/2022); Revision Total Knee Arthroplasty (Left, 01/30/2022);  Femur fracture surgery (Left, 1/30/2022); and Revision total knee arthroplasty (Left, 1/30/2022). Restrictions  Restrictions/Precautions  Restrictions/Precautions: Weight Bearing,Isolation,Fall Risk  Required Braces or Orthoses?: Yes  Lower Extremity Weight Bearing Restrictions  Left Lower Extremity Weight Bearing: Weight Bearing As Tolerated  Required Braces or Orthoses  Right Lower Extremity Brace: Ankle Foot Orthotics  Left Lower Extremity Brace: Erika Foot Orthotics  Position Activity Restriction  Other position/activity restrictions: 1/30 L IT fx s/p HWR w/ CMN-fixation; wears BLE AFOs at baseline d/t dropfoot; wound vac to L knee, watch BP and HR  Subjective   General  Chart Reviewed: Yes  Response To Previous Treatment: Patient with no complaints from previous session. Subjective  Subjective: RN and Pt  agreeable to therapy. Pt supine in bed upon arrival. Pt pleasant and cooperative throughout today's session. General Comment  Comments: Pt retired to recliner with call light with B LEs elevated. Pt had dry heaves t/o therapy session. RN notified. BP monitored t/o. Pain Screening  Patient Currently in Pain: Yes  Pain Assessment  Pain Level: 7  Pain Type: Acute pain;Surgical pain  Pain Location: Knee  Pain Orientation: Left  Pain Descriptors: Aching;Discomfort  Response to Pain Intervention: Patient Satisfied  Vital Signs  Patient Currently in Pain: Yes       Orientation  Orientation  Overall Orientation Status: Within Functional Limits  Cognition   Cognition  Overall Cognitive Status: WFL  Objective   Bed mobility  Supine to Sit: Maximum assistance  Scooting: Maximal assistance  Comment: HOB elevated ~60 degrees with use of hand rail and writer's hand. Increased time and effort with transition. /83 sitting EOB x 5 minutes. Transfers  Sit to Stand: Maximum Assistance  Stand to sit: Maximum Assistance  Bed to Chair: 2 Person Assistance; Moderate assistance  Stand Pivot Transfers: Moderate Assistance;2 Person Assistance  Comment: transfers performed with RW.   Max verbal cues for upright posture and hand placement. Ambulation  WB Status: WBAT LLE  Ambulation 1  Surface: level tile  Device: Rolling Walker  Other Apparatus: AFO; Left;Right  Assistance: 2 Person assistance; Moderate assistance  Distance: 3 steps bed ->commode, seated rest period, then commode -->recliner. Comments: limited by pain and nausea , pt c/o of feeling sick and wanting to throw up; dry heaves t/o. Max verbal cues for upright posture during transition. Stairs/Curb  Stairs?: No     Balance  Posture: Fair  Sitting - Static: Good  Sitting - Dynamic: Good;-  Standing - Static: Fair  Standing - Dynamic: Fair;-  Comments: standing balance assessed while using a  Exercises  Quad Sets: x 10 reps with 3 second hold  Gluteal Sets: x 10 reps  Comments: Pt sat at EOB x 5 minutes with CGA; Pt sat on commode x 20 minutes with CGA/SBA with constant fwd flexed posture      AM-PAC Score  AM-PAC Inpatient Mobility Raw Score : 12 (02/04/22 1149)  AM-PAC Inpatient T-Scale Score : 35.33 (02/04/22 1149)  Mobility Inpatient CMS 0-100% Score: 68.66 (02/04/22 1149)  Mobility Inpatient CMS G-Code Modifier : CL (02/04/22 1149)          Goals  Short term goals  Time Frame for Short term goals: 14 visits  Short term goal 1: Pt will ambulate 125 feet with least restrictive device and CGA to increase functional independence. Short term goal 2: Pt will tolerate a 35 minute therapy session to promote increased endurance. Short term goal 3: Pt will demonstrate good- standing balance to decrease fall risk. Short term goal 4: Pt will negotiate 5 stairs with no handrails and a SPC to allow the pt to enter prior living arrangements. Short term goal 5: Pt will perform sit<>stand transfer with SBA to increase functional independence.     Plan    Plan  Times per week: 6-7  Times per day: Daily  Current Treatment Recommendations: Strengthening,Transfer Training,Endurance Training,ROM,Balance Training,Gait Training,Functional Mobility Training,Stair training,Safety Education & Training,Home Exercise Program,Equipment Evaluation, Education, & procurement,Patient/Caregiver Education & Training  Safety Devices  Type of devices: Nurse notified,All fall risk precautions in place,Gait belt,Left in chair,Call light within reach  Restraints  Initially in place: No     Therapy Time   Individual Concurrent Group Co-treatment   Time In 1052         Time Out 1145         Minutes 53         Timed Code Treatment Minutes: One [facility]       Jb [place]

## 2022-02-04 NOTE — PLAN OF CARE
Problem: Airway Clearance - Ineffective  Goal: Achieve or maintain patent airway  2/4/2022 1016 by Martha Brice RN  Outcome: Ongoing  2/4/2022 0100 by Chang Delatorre RN  Outcome: Ongoing     Problem: Gas Exchange - Impaired  Goal: Absence of hypoxia  2/4/2022 1016 by Mratha Brice RN  Outcome: Ongoing  2/4/2022 0100 by Chang Delatorre RN  Outcome: Ongoing  Goal: Promote optimal lung function  2/4/2022 1016 by Martha Brice RN  Outcome: Ongoing  2/4/2022 0100 by Chang Delatorre RN  Outcome: Ongoing     Problem: Breathing Pattern - Ineffective  Goal: Ability to achieve and maintain a regular respiratory rate  2/4/2022 1016 by Martha Brice RN  Outcome: Ongoing  2/4/2022 0100 by Chang Delatorre RN  Outcome: Ongoing     Problem:  Body Temperature -  Risk of, Imbalanced  Goal: Ability to maintain a body temperature within defined limits  2/4/2022 1016 by Martha Brice RN  Outcome: Ongoing  2/4/2022 0100 by Chang Delatorre RN  Outcome: Ongoing  Goal: Will regain or maintain usual level of consciousness  2/4/2022 1016 by Martha Brice RN  Outcome: Ongoing  2/4/2022 0100 by Chang Delatorre RN  Outcome: Ongoing  Goal: Complications related to the disease process, condition or treatment will be avoided or minimized  2/4/2022 1016 by Martha Brice RN  Outcome: Ongoing  2/4/2022 0100 by Chang Delatorre RN  Outcome: Ongoing     Problem: Isolation Precautions - Risk of Spread of Infection  Goal: Prevent transmission of infection  2/4/2022 1016 by Martha Brice RN  Outcome: Ongoing  2/4/2022 0100 by Chang Delatorre RN  Outcome: Ongoing     Problem: Nutrition Deficits  Goal: Optimize nutritional status  2/4/2022 1016 by Martha Brice RN  Outcome: Ongoing  2/4/2022 0100 by Chang Delatorre RN  Outcome: Ongoing     Problem: Risk for Fluid Volume Deficit  Goal: Maintain normal heart rhythm  2/4/2022 1016 by Martha Brice RN  Outcome: Ongoing  2/4/2022 0100 by Chang Delatorre RN  Outcome: Ongoing  Goal: Maintain absence of muscle cramping  2/4/2022 1016 by Jaye Atwood RN  Outcome: Ongoing  2/4/2022 0100 by Russel Sawyer RN  Outcome: Ongoing  Goal: Maintain normal serum potassium, sodium, calcium, phosphorus, and pH  2/4/2022 1016 by Jaye Atwood RN  Outcome: Ongoing  2/4/2022 0100 by Russel Sawyer RN  Outcome: Ongoing     Problem: Loneliness or Risk for Loneliness  Goal: Demonstrate positive use of time alone when socialization is not possible  2/4/2022 1016 by Jaye Atwood RN  Outcome: Ongoing  2/4/2022 0100 by Russel Sawyer RN  Outcome: Ongoing     Problem: Fatigue  Goal: Verbalize increase energy and improved vitality  2/4/2022 1016 by Jaye Atwood RN  Outcome: Ongoing  2/4/2022 0100 by Russel Sawyer RN  Outcome: Ongoing     Problem: Patient Education: Go to Patient Education Activity  Goal: Patient/Family Education  2/4/2022 1016 by Jaye Atwood RN  Outcome: Ongoing  2/4/2022 0100 by Russel Sawyer RN  Outcome: Ongoing     Problem: Pain:  Goal: Pain level will decrease  Description: Pain level will decrease  2/4/2022 1016 by Jaye Atwood RN  Outcome: Ongoing  2/4/2022 0100 by Russel Sawyer RN  Outcome: Ongoing  Goal: Control of acute pain  Description: Control of acute pain  2/4/2022 1016 by Jaye Atwood RN  Outcome: Ongoing  2/4/2022 0100 by Russel Sawyer RN  Outcome: Ongoing  Goal: Control of chronic pain  Description: Control of chronic pain  2/4/2022 1016 by Jaye Atwood RN  Outcome: Ongoing  2/4/2022 0100 by Russel Sawyer RN  Outcome: Ongoing     Problem: Skin Integrity:  Goal: Will show no infection signs and symptoms  Description: Will show no infection signs and symptoms  2/4/2022 1016 by Jaye Atwood RN  Outcome: Ongoing  2/4/2022 0100 by Russel Sawyer RN  Outcome: Ongoing  Goal: Absence of new skin breakdown  Description: Absence of new skin breakdown  2/4/2022 1016 by Jaye Atwood RN  Outcome: Ongoing  2/4/2022 0100 by Russel Sawyer RN  Outcome: Ongoing     Problem: Falls - Risk of:  Goal: Will remain free from falls  Description: Will remain free from falls  2/4/2022 1016 by Lennox Greenspan, RN  Outcome: Ongoing  2/4/2022 0100 by Lynette Moreno RN  Outcome: Ongoing  Goal: Absence of physical injury  Description: Absence of physical injury  2/4/2022 1016 by Lennox Greenspan, RN  Outcome: Ongoing  2/4/2022 0100 by Lynette Moreno RN  Outcome: Ongoing     Problem: Nutrition  Goal: Optimal nutrition therapy  2/4/2022 1016 by Lennox Greenspan, RN  Outcome: Ongoing  2/4/2022 0100 by Lynette Moreno RN  Outcome: Ongoing

## 2022-02-04 NOTE — PROGRESS NOTES
Progress Note    Patient:  Obdulia Hernández  YOB: 1945     77 y.o. male    Subjective:  Patient seen and examined at bedside this morning. No new complaints or concerns per patient this morning. Still having pain in LLE. Notes muscle spasms in the knee today. No acute issues overnight per nursing. Denies: fever/chills, HA, CP, SOB, N/V, dysuria, or numbness/tingling in extremities. Did not work with therapy due to low hemoglobin  No output from Prevena overnight. Objective:   Vitals:    02/04/22 0400   BP: (!) 100/57   Pulse: 71   Resp: 15   Temp: 98.8 °F (37.1 °C)   SpO2: 97%     Gen: NAD, cooperative   Cardiovascular: Regular rate  Respiratory: no audible wheezing, symmetrical chest expansion   MSK: LLE: Prevena on, with sponge suctioned down and no pooling of blood under sponge. Conner Carry is c/d/i. No sign of strike through bleeding. Skin surrounding incision ecchymotic and tender to palpation. Dressings at the hip c/d/i. Appropriate tenderness to palpation about the hip and knee. Toleration 30 degrees of passive motion at the knee. EHL/FHL/TA/GSC gross motor intact. Sensation intact to light touch. DP/PT pulse 2+. Recent Labs     02/03/22  0541   WBC 5.7   HGB 7.0*   HCT 21.4*   *   *   K 4.6   BUN 17   CREATININE 0.65*   GLUCOSE 96       Meds: Lovenox, Keflex   See rec for complete list    Impression: 77 y.o. male who fell from standing antoinette, being seen for:     -Right IT fracture s/p right femur CMN HWR and CMN insertion POD#5      Plan:     -Prevena on. Please maintain.  -Continue prophylactic antibiotics Keflex 500 mg QID (2/2 start date). Patient to receive a total of 14 days of Keflex. Please ensure patient is discharged with remaining days of Keflex to total 14 days of antibiotic treatment  -WBAT LLE   -Last Hemoglobin 7.2. Defer to primary team regarding transfusion management.   -Pain control: per primary team discretion.  Would recommend multimodal pain management protocols   -Complains of muscle spasms. Would recommend muscle relaxer. -Ice (20 min, 1 hour off) and elevation for edema/pain control  -Encourage deep breathing and IS  -DVT ppx: EPC. OK for chemical anticoagulation from orthopedics perspective   -PT/OT to continue to evaluate and treat  -OK for discharge from ortho perspective and to follow up with Dr. Shahriar Iverson 10-14 days from date of surgery   -Please page Ortho with any questions or concerns    -------------------------------------  Toby Giraldo DO  PGY-1, Department of [address] [place], [place] [address]y 2, [place], [place]      PGY-2 Addendum    Patient seen and examined. Agree with above. 77 y.o. male being seen for the aforementioned injury. Patient overall doing well with no major complaints today. Prevena maintaining suction with no output overnight. Tenderness to palpation along knee. Sensation intact to light touch. Dressings at hip c/d/i. Ensure patient receives keflex on discharge. OK for discharge from ortho perspective and follow up with Dr. Shahriar Iverson 10-14 days after surgery. Please page ortho with questions.        Elizabeth Taylor DO, PGY-2  Orthopedic Surgery Resident  Maggie Mejia, [facility]

## 2022-02-04 NOTE — PRE-CERTIFICATION NOTE
Spoke with Dr Evon Gil regarding peer to peer determination, at this time patient does not have medical complexity to meet justification for inpatient rehab, recommend SNF, no peer to peer at this time. Joycelyn Martinez CM updated at this time.

## 2022-02-04 NOTE — PRE-CERTIFICATION NOTE
[id number] update on pre-certification status. Returned call and spoke with Charmaine Goltz, updated that Vm received from [facility] representative, Noreen Overton, that pre-certification case has been sent to physician for review. Will update as soon as additional information becomes available from [facility].

## 2022-02-04 NOTE — PROGRESS NOTES
[facility])  Occupational Therapy Not Seen Note    DATE: 2022    NAME: Mike Gillette  MRN: [id number]   : 1945      Patient not seen this date for Occupational Therapy due to:    Patient nauseous upon attempt,  RN notified x2 from PT and OT for patient request of nausea medication.      Next Scheduled Treatment: Ck  as appropriate     Electronically signed by Iván Rockwell OT on 2022 at 12:53 PM

## 2022-02-05 LAB
ABSOLUTE EOS #: 0.22 K/UL (ref 0–0.44)
ABSOLUTE IMMATURE GRANULOCYTE: 0.06 K/UL (ref 0–0.3)
ABSOLUTE LYMPH #: 0.98 K/UL (ref 1.1–3.7)
ABSOLUTE MONO #: 1.12 K/UL (ref 0.1–1.2)
BASOPHILS # BLD: 1 % (ref 0–2)
BASOPHILS ABSOLUTE: 0.05 K/UL (ref 0–0.2)
DIFFERENTIAL TYPE: ABNORMAL
EOSINOPHILS RELATIVE PERCENT: 4 % (ref 1–4)
HCT VFR BLD CALC: 23.3 % (ref 40.7–50.3)
HEMOGLOBIN: 7.5 G/DL (ref 13–17)
IMMATURE GRANULOCYTES: 1 %
LYMPHOCYTES # BLD: 18 % (ref 24–43)
MCH RBC QN AUTO: 31.6 PG (ref 25.2–33.5)
MCHC RBC AUTO-ENTMCNC: 32.2 G/DL (ref 28.4–34.8)
MCV RBC AUTO: 98.3 FL (ref 82.6–102.9)
MONOCYTES # BLD: 20 % (ref 3–12)
NRBC AUTOMATED: 0 PER 100 WBC
PDW BLD-RTO: 14.6 % (ref 11.8–14.4)
PLATELET # BLD: 235 K/UL (ref 138–453)
PLATELET ESTIMATE: ABNORMAL
PMV BLD AUTO: 9.6 FL (ref 8.1–13.5)
RBC # BLD: 2.37 M/UL (ref 4.21–5.77)
RBC # BLD: ABNORMAL 10*6/UL
SEG NEUTROPHILS: 56 % (ref 36–65)
SEGMENTED NEUTROPHILS ABSOLUTE COUNT: 3.11 K/UL (ref 1.5–8.1)
WBC # BLD: 5.5 K/UL (ref 3.5–11.3)
WBC # BLD: ABNORMAL 10*3/UL

## 2022-02-05 PROCEDURE — 97110 THERAPEUTIC EXERCISES: CPT

## 2022-02-05 PROCEDURE — 6370000000 HC RX 637 (ALT 250 FOR IP): Performed by: NURSE PRACTITIONER

## 2022-02-05 PROCEDURE — 85025 COMPLETE CBC W/AUTO DIFF WBC: CPT

## 2022-02-05 PROCEDURE — 2580000003 HC RX 258: Performed by: STUDENT IN AN ORGANIZED HEALTH CARE EDUCATION/TRAINING PROGRAM

## 2022-02-05 PROCEDURE — 6370000000 HC RX 637 (ALT 250 FOR IP): Performed by: STUDENT IN AN ORGANIZED HEALTH CARE EDUCATION/TRAINING PROGRAM

## 2022-02-05 PROCEDURE — 36415 COLL VENOUS BLD VENIPUNCTURE: CPT

## 2022-02-05 PROCEDURE — 1200000000 HC SEMI PRIVATE

## 2022-02-05 PROCEDURE — APPSS30 APP SPLIT SHARED TIME 16-30 MINUTES: Performed by: NURSE PRACTITIONER

## 2022-02-05 PROCEDURE — 97116 GAIT TRAINING THERAPY: CPT

## 2022-02-05 PROCEDURE — 6370000000 HC RX 637 (ALT 250 FOR IP): Performed by: HEALTH CARE PROVIDER

## 2022-02-05 PROCEDURE — 99232 SBSQ HOSP IP/OBS MODERATE 35: CPT | Performed by: INTERNAL MEDICINE

## 2022-02-05 RX ADMIN — PANTOPRAZOLE SODIUM 40 MG: 40 TABLET, DELAYED RELEASE ORAL at 05:57

## 2022-02-05 RX ADMIN — CEPHALEXIN 500 MG: 500 CAPSULE ORAL at 12:26

## 2022-02-05 RX ADMIN — CEPHALEXIN 500 MG: 500 CAPSULE ORAL at 08:39

## 2022-02-05 RX ADMIN — METHOCARBAMOL TABLETS 750 MG: 750 TABLET, COATED ORAL at 23:52

## 2022-02-05 RX ADMIN — CEPHALEXIN 500 MG: 500 CAPSULE ORAL at 21:35

## 2022-02-05 RX ADMIN — GABAPENTIN 300 MG: 300 CAPSULE ORAL at 21:35

## 2022-02-05 RX ADMIN — CEPHALEXIN 500 MG: 500 CAPSULE ORAL at 17:11

## 2022-02-05 RX ADMIN — SENNOSIDES 8.6 MG: 8.6 TABLET, COATED ORAL at 21:35

## 2022-02-05 RX ADMIN — METHOCARBAMOL TABLETS 750 MG: 750 TABLET, COATED ORAL at 00:30

## 2022-02-05 RX ADMIN — SODIUM CHLORIDE, PRESERVATIVE FREE 10 ML: 5 INJECTION INTRAVENOUS at 21:46

## 2022-02-05 RX ADMIN — GABAPENTIN 300 MG: 300 CAPSULE ORAL at 14:07

## 2022-02-05 RX ADMIN — TAMSULOSIN HYDROCHLORIDE 0.4 MG: 0.4 CAPSULE ORAL at 08:44

## 2022-02-05 RX ADMIN — ROPINIROLE HYDROCHLORIDE 0.25 MG: 0.25 TABLET, FILM COATED ORAL at 21:35

## 2022-02-05 RX ADMIN — ACETAMINOPHEN 1000 MG: 325 TABLET ORAL at 14:08

## 2022-02-05 RX ADMIN — BUDESONIDE AND FORMOTEROL FUMARATE DIHYDRATE 2 PUFF: 160; 4.5 AEROSOL RESPIRATORY (INHALATION) at 21:36

## 2022-02-05 RX ADMIN — BUMETANIDE 2 MG: 1 TABLET ORAL at 08:39

## 2022-02-05 RX ADMIN — ACETAMINOPHEN 1000 MG: 325 TABLET ORAL at 05:55

## 2022-02-05 RX ADMIN — METHOCARBAMOL TABLETS 750 MG: 750 TABLET, COATED ORAL at 12:25

## 2022-02-05 RX ADMIN — PANCRELIPASE 24000 UNITS: 24000; 76000; 120000 CAPSULE, DELAYED RELEASE PELLETS ORAL at 17:11

## 2022-02-05 RX ADMIN — GABAPENTIN 300 MG: 300 CAPSULE ORAL at 05:56

## 2022-02-05 RX ADMIN — DOCUSATE SODIUM 100 MG: 100 CAPSULE ORAL at 08:39

## 2022-02-05 RX ADMIN — PANCRELIPASE 24000 UNITS: 24000; 76000; 120000 CAPSULE, DELAYED RELEASE PELLETS ORAL at 08:38

## 2022-02-05 RX ADMIN — Medication 500000 UNITS: at 08:40

## 2022-02-05 RX ADMIN — RIVAROXABAN 20 MG: 20 TABLET, FILM COATED ORAL at 17:12

## 2022-02-05 RX ADMIN — METHOCARBAMOL TABLETS 750 MG: 750 TABLET, COATED ORAL at 05:56

## 2022-02-05 RX ADMIN — ACETAMINOPHEN 1000 MG: 325 TABLET ORAL at 21:35

## 2022-02-05 RX ADMIN — PANCRELIPASE 24000 UNITS: 24000; 76000; 120000 CAPSULE, DELAYED RELEASE PELLETS ORAL at 12:24

## 2022-02-05 RX ADMIN — Medication 500000 UNITS: at 12:26

## 2022-02-05 RX ADMIN — Medication 500000 UNITS: at 21:35

## 2022-02-05 RX ADMIN — Medication 500000 UNITS: at 17:11

## 2022-02-05 RX ADMIN — ROPINIROLE HYDROCHLORIDE 0.25 MG: 0.25 TABLET, FILM COATED ORAL at 08:41

## 2022-02-05 RX ADMIN — METHOCARBAMOL TABLETS 750 MG: 750 TABLET, COATED ORAL at 17:12

## 2022-02-05 RX ADMIN — METOPROLOL TARTRATE 25 MG: 25 TABLET ORAL at 08:40

## 2022-02-05 RX ADMIN — SODIUM CHLORIDE, PRESERVATIVE FREE 5 ML: 5 INJECTION INTRAVENOUS at 08:44

## 2022-02-05 RX ADMIN — POLYETHYLENE GLYCOL 3350 17 G: 17 POWDER, FOR SOLUTION ORAL at 08:40

## 2022-02-05 ASSESSMENT — ENCOUNTER SYMPTOMS
COLOR CHANGE: 0
EYE DISCHARGE: 0
VOMITING: 0
ABDOMINAL DISTENTION: 0
APNEA: 0
COUGH: 0
SHORTNESS OF BREATH: 1
NAUSEA: 0
DIARRHEA: 0

## 2022-02-05 ASSESSMENT — PAIN DESCRIPTION - PAIN TYPE: TYPE: ACUTE PAIN;SURGICAL PAIN

## 2022-02-05 ASSESSMENT — PAIN SCALES - GENERAL
PAINLEVEL_OUTOF10: 5
PAINLEVEL_OUTOF10: 0
PAINLEVEL_OUTOF10: 7
PAINLEVEL_OUTOF10: 3
PAINLEVEL_OUTOF10: 7
PAINLEVEL_OUTOF10: 0
PAINLEVEL_OUTOF10: 0

## 2022-02-05 ASSESSMENT — PAIN - FUNCTIONAL ASSESSMENT: PAIN_FUNCTIONAL_ASSESSMENT: PREVENTS OR INTERFERES SOME ACTIVE ACTIVITIES AND ADLS

## 2022-02-05 ASSESSMENT — PAIN DESCRIPTION - DESCRIPTORS: DESCRIPTORS: SHARP;SORE

## 2022-02-05 ASSESSMENT — PAIN DESCRIPTION - ONSET: ONSET: ON-GOING

## 2022-02-05 ASSESSMENT — PAIN DESCRIPTION - PROGRESSION: CLINICAL_PROGRESSION: NOT CHANGED

## 2022-02-05 ASSESSMENT — PAIN DESCRIPTION - ORIENTATION: ORIENTATION: LEFT

## 2022-02-05 ASSESSMENT — PAIN DESCRIPTION - FREQUENCY: FREQUENCY: CONTINUOUS

## 2022-02-05 ASSESSMENT — PAIN DESCRIPTION - LOCATION: LOCATION: KNEE

## 2022-02-05 NOTE — PROGRESS NOTES
Orthopedic Progress Note    Patient:  Belinda Fam  YOB: 1945     77 y.o. male    Subjective:  Patient seen and examined at bedside this morning. No specific complaints or concerns this morning. No acute events overnight per nursing. Patient is still undergoing placement and having difficulty. Denied inpatient rehab placement. Isolation likely to be DC'd on Monday and hopeful for discharge then. PT: Mobilize roughly 3 steps with PT yesterday with a rolling walker    Objective:   Vitals:    02/05/22 0400   BP: (!) 106/56   Pulse: 73   Resp: 19   Temp: 97.7 °F (36.5 °C)   SpO2: 96%     Gen: NAD, cooperative   Cardiovascular: Regular rate  Respiratory: no audible wheezing, symmetrical chest expansion     MSK:   LLE: Prevena on which did not appear to be maintaining suction. Prevena plugged into wall and suction again was maintained. No output in canister. Mild serosanguineous saturation to the lateral proximal hip dressing. Appropriate tenderness to palpation about the hip and knee. Passive left knee range of motion from 0 to 30 degrees with mild discomfort. Wiggles all toes with minimal range of motion of the ankle. Responsive to light touch in the sural, saphenous, SP/DP, plantar nerve distribution. DP pulse 2+. Recent Labs     02/03/22  0541 02/03/22  0541 02/04/22  0500   WBC 5.7   < > 5.2   HGB 7.0*   < > 7.2*   HCT 21.4*   < > 22.5*   *   < > 190   *  --   --    K 4.6  --   --    BUN 17  --   --    CREATININE 0.65*  --   --    GLUCOSE 96  --   --     < > = values in this interval not displayed. Meds: Xarelto, Keflex   See rec for complete list    Impression: 77 y.o. male who fell from standing antoinette, being seen for:     -Left IT femur fracture s/p left femur CMN insertion, HWR and knee poly exchange, DOS 1/30/22 POD#6    Plan:     -Diallo Iván in place and plugged into the wall outlet. Ensure appropriate suction is maintained throughout the day.   Please page Ortho if any malfunction is noted. No output in canister. Please continue to record any output.  -Continue prophylactic antibiotics Keflex 500 mg QID (2/2 start date). Patient to receive a total of 14 days of Keflex. Please ensure patient is discharged with remaining days of Keflex to total 14 days of antibiotic treatment which started on 2/2/2022  -Soft dressings to the left femur. Change as needed per nursing staff. -WBAT LLE   -Hemoglobin 7.2 yesterday  -Pain control: per primary team discretion. Recommend multimodal pain management protocols   -Ice (20 min, 1 hour off) and elevation for edema/pain control  -Encourage deep breathing and IS  -DVT ppx: EPC.  OK for chemical anticoagulation from orthopedics perspective   -PT/OT to continue to evaluate and treat  -OK for discharge from ortho perspective and to follow up with Dr. Martín Mehta 10-14 days from date of surgery   -Please page Ortho with any questions or concerns    Jose Hurt DO  Orthopedic Surgery Resident, PGY-3  [address]

## 2022-02-05 NOTE — CARE COORDINATION
Spoke to Rocío Pedro at [facility].  She did not receive referral. Hard copy sent

## 2022-02-05 NOTE — PROGRESS NOTES
Physical Therapy  Facility/Department: [facility]  Daily Treatment Note  NAME: Obdulia Hernández  : 1945  MRN: [id number]    Date of Service: 2022    Discharge Recommendations:  Further therapy recommended at discharge. The patient should be able to tolerate at least 3 hours of therapy per day over 5 days or 15 hours over 7 days. This patient may benefit from a Physical Medicine and Rehab consult. PT Equipment Recommendations  Equipment Needed:  (CTA)    Assessment   Body structures, Functions, Activity limitations: Decreased functional mobility ; Decreased posture;Decreased endurance;Decreased ROM; Decreased strength;Decreased balance; Increased pain  Assessment: Pt required maxA with HOB elevated with increased time/effort. Sit<->stand transfers with MaxA and verbal cues to maintain upright posture. Amb ~3 steps with maxA and verbal cues to maintain upright posture. Pt able to tolerate weight bearing through LLE, but requires physical assistance advancing LLE during ambulation. Pt would be unsafe to return to prior living arrangements upon discharge. Pt would benefit from additional intense PT to address deficits. Prognosis: Good  PT Education: Goals;PT Role;General Safety;Weight-bearing Education;Plan of Care;Gait Training;Functional Mobility Training;Transfer Training  REQUIRES PT FOLLOW UP: Yes  Activity Tolerance  Activity Tolerance: Patient limited by endurance; Patient limited by pain     Patient Diagnosis(es): The encounter diagnosis was Left displaced femoral neck fracture ([address]      has a past medical history of Acute superficial gastritis without hemorrhage, Anastomotic stricture of stomach, Asthma, Atrial fibrillation ([address] Calculus of gallbladder without cholecystitis without obstruction, Chronic diastolic heart failure (HCC), Chronic rhinosinusitis, Class 2 severe obesity due to excess calories with serious comorbidity and body mass index (BMI) of 36.0 to 36.9 in [facility]), COPD (chronic obstructive pulmonary disease) ([facility] E. coli septicemia ([facility] E. coli UTI, Foot drop, right, Fracture of femur ([facility] Gait disorder, Gastric out let obstruction, GERD (gastroesophageal reflux disease), Hallux valgus, acquired, bilateral, Hyperlipidemia, Hypertension, Impaired hearing, Internal hemorrhoids, Lymphedema of both lower extremities, Nausea & vomiting, OA (osteoarthritis) of knee, bilateral, Obesity, MARIAMA on CPAP, Osteoarthritis, Osteoarthritis of lumbar spine, S/P revision of total knee, Septicemia due to E. coli (HCC), Sick sinus syndrome (HCC), Simple chronic bronchitis (HCC), Slow transit constipation, Unspecified sleep apnea, and UTI (urinary tract infection). has a past surgical history that includes Finger amputation (1966); Gastric bypass surgery (1985); Carpal tunnel release; Colonoscopy; Rotator cuff repair; joint replacement (2004 & 2005); Hemorrhoid surgery; pr egd transoral biopsy single/multiple (N/A, 05/25/2018); Upper gastrointestinal endoscopy (08/13/2018); Upper gastrointestinal endoscopy (N/A, 08/13/2018); Upper gastrointestinal endoscopy (N/A, 08/13/2018); Upper gastrointestinal endoscopy (08/16/2018); pr egd flexible foreign body removal (N/A, 08/16/2018); Upper gastrointestinal endoscopy (08/16/2018); Upper gastrointestinal endoscopy (N/A, 10/01/2018); Upper gastrointestinal endoscopy (10/01/2018); Upper gastrointestinal endoscopy (N/A, 11/19/2018); Cystoscopy (01/02/2019); Cystoscopy (N/A, 01/02/2019); Upper gastrointestinal endoscopy (N/A, 10/15/2020); Colonoscopy (N/A, 04/05/2021); Pacemaker insertion (04/09/2021); Upper gastrointestinal endoscopy (N/A, 7/16/2021); hip surgery (Left, 01/30/2022); knee surgery (Left, 01/30/2022); hip surgery (Left, 01/30/2022); Revision Total Knee Arthroplasty (Left, 01/30/2022); Femur fracture surgery (Left, 1/30/2022); and Revision total knee arthroplasty (Left, 1/30/2022).     Restrictions  Restrictions/Precautions  Restrictions/Precautions: Weight Bearing,Isolation,Fall Risk  Required Braces or Orthoses?: Yes  Lower Extremity Weight Bearing Restrictions  Left Lower Extremity Weight Bearing: Weight Bearing As Tolerated  Required Braces or Orthoses  Right Lower Extremity Brace: Ankle Foot Orthotics  Left Lower Extremity Brace: Erika Foot Orthotics  Position Activity Restriction  Other position/activity restrictions: 1/30 L IT fx s/p HWR w/ CMN-fixation; wears BLE AFOs at baseline d/t dropfoot; wound vac to L knee, watch BP and HR  Subjective   General  Response To Previous Treatment: Patient with no complaints from previous session. Family / Caregiver Present: No  Subjective  Subjective: RN and Pt  agreeable to therapy. Pt supine in bed upon arrival. Pt pleasant and cooperative throughout today's session. General Comment  Comments: Pt left in bed with call light within reach  Pain Screening  Patient Currently in Pain: Yes  Pain Assessment  Pain Assessment: 0-10  Pain Level: 5  Pain Type: Acute pain;Surgical pain  Pain Location: Knee  Pain Orientation: Left  Pain Descriptors: Zander Calvillo; Sore  Pain Frequency: Continuous  Pain Onset: On-going  Clinical Progression: Not changed  Functional Pain Assessment: Prevents or interferes some active activities and ADLs  Non-Pharmaceutical Pain Intervention(s): Ambulation/Increased Activity;Repositioned  Vital Signs  Patient Currently in Pain: Yes       Orientation  Orientation  Overall Orientation Status: Within Normal Limits  Cognition      Objective   Bed mobility  Rolling to Right: Minimal assistance  Supine to Sit: Moderate assistance  Sit to Supine: Maximum assistance  Scooting: Maximal assistance  Comment: HOB elevated, increased time and effort required  Transfers  Sit to Stand: Maximum Assistance  Stand to sit: Moderate Assistance  Comment: transfers performed with RW. Max verbal cues for upright posture and hand placement.   Ambulation  Ambulation?: Yes  Ambulation 1  Surface: level tile  Device: Rolling Training,Functional Mobility Training,Stair training,Safety Education & Training,Home Exercise Program,Equipment Evaluation, Education, & procurement,Patient/Caregiver Education & Training  Safety Devices  Type of devices: Nurse notified,All fall risk precautions in place,Gait belt,Call light within reach,Left in bed  Restraints  Initially in place: No     Therapy Time   Individual Concurrent Group Co-treatment   Time In 1455         Time Out 1521         Minutes 26         Timed Code Treatment Minutes: [place] [address], [place]

## 2022-02-05 NOTE — PROGRESS NOTES
PROGRESS NOTE      PATIENT NAME: [address] RECORD NO. [id number]  DATE: 2/5/2022  PRIMARY CARE PHYSICIAN: Seema Sellers, JULIAN - CNP    HD: # 7    ASSESSMENT    Patient Active Problem List   Diagnosis    Chronic atrial fibrillation (HCC)    Dyslipidemia    Gastroesophageal reflux disease without esophagitis    Osteoarthritis of lumbar spine    OA (osteoarthritis) of knee, bilateral    Foot drop, right    Hallux valgus, acquired, bilateral    Hearing impairment    Essential hypertension    Slow transit constipation    MARIAMA on CPAP    COPD (chronic obstructive pulmonary disease) (HCC)    Chronic diastolic heart failure (HCC)    History of bariatric surgery including banding leading to esophageal stricture and aspiration    BPH (benign prostatic hyperplasia)    Myalgia    Atrial fibrillation with slow ventricular response (HCC)    Pacemaker pocket hematoma, initial encounter    Pulmonary hypertension ([facility]    Presence of permanent cardiac pacemaker    Fall at home, initial encounter    Left displaced femoral neck fracture ([facility] [address] [facility]    Retained orthopedic hardware    COVID-19    Elevated C-reactive protein (CRP)       MEDICAL DECISION MAKING AND PLAN    Neuro -MMPT -tylenol, gabapentin, robaxin, home requip  CV -BP remains systolic 63-118I, asymptomatic  Heme: Hgb 7.5 this AM, xarelto restated. Pulm -encourage incentive spirometry, 2 L nasal cannula for COPD, Satting well without difficulty breathing. Renal -voiding spontaneously. Cont daily labs, encourage po intake. GI -regular diet  ID -infectious disease on board for Covid. Received Sotrivimab 1/29. Placed on Keflex by ortho on 2/2. Recommend 14 day course due to continued drainage in the left knee after surgery and history of left TKA. Endo- glucose less than 180  MSK-status post left femur CMN, weightbearing as tolerated left lower extremity, Encourage PT/OT  Dispo - COVID isolation on 2/7 and PT OT discharge planning.       Chief Complaint: [facility] with right hip IT fracture s/p right femoral HWR and CMN insertion    SUBJECTIVE    Waynette Shone was seen and examined at bedside this morning. Pain improving. Notes muscle spasms in the knee today. No acute issues overnight per nursing. Denies: fever/chills, HA, CP, SOB, N/V, dysuria, or numbness/tingling in extremities. OBJECTIVE  VITALS: Temp: Temp: 98.2 °F (36.8 °C)Temp  Av.1 °F (36.7 °C)  Min: 97.7 °F (36.5 °C)  Max: 98.8 °F (67.3 °C) BP Systolic (28DCN), IEC:052 , Min:98 , DENIA:570   Diastolic (73VDU), VZI:73, Min:52, Max:74   Pulse Pulse  Av.6  Min: 71  Max: 90 Resp Resp  Av.6  Min: 14  Max: 20 Pulse ox SpO2  Av.9 %  Min: 91 %  Max: 100 %  CONSTITUTIONAL: awake, alert, cooperative, no apparent distress  ENT: ears are symmetric, nares patent   HEENT: moist mucous membranes  NECK: Supple, symmetrical, trachea midline  LUNGS: no respiratory distress, no audible wheezing, no chest wall tenderness, AICD palpable in left chest  CARDIOVASCULAR: Regular rate and rhythm  ABDOMEN: soft, nontender, nondistended, no guarding, no rebound tenderness   MUSCULOSKELETAL: LLE: Prevena on, with sponge suctioned down and no pooling of blood under sponge. Suyapa Mortimer is c/d/i. No sign of strike through bleeding. Dressings at the hip c/d/i. Appropriate tenderness to palpation about the hip and knee. Toleration 30 degrees of passive motion at the knee. EHL/FHL/TA/GSC gross motor intact. Sensation intact to light touch. DP/PT pulse 2+. NEUROLOGIC: Awake, alert, oriented to name, place and time  EXTREMITIES: peripheral pulses normal, + pedal edema left  SKIN: normal coloration and turgor    I/O last 3 completed shifts:  In: -   Out: 2900 [Urine:2900]    Drain/tube output:   In: -   Out: 2300 [Urine:2300]    LAB:  CBC:   Recent Labs     22  0541 22  0500 22  0648   WBC 5.7 5.2 5.5   HGB 7.0* 7.2* 7.5*   HCT 21.4* 22.5* 23.3*   MCV 96.8 97.4 98.3   * 190 235     BMP:   Recent Labs 02/03/22  0541   *   K 4.6   CL 98   CO2 22   BUN 17   CREATININE 0.65*   GLUCOSE 96     COAGS: No results for input(s): APTT, PROT, INR in the last 72 hours. RADIOLOGY:  reviewed    Electronically signed by Peter Condon DO on 2/5/2022 at 9:59 AM       Due to the current efforts to prevent transmission of COVID-19 and also the need to preserve PPE for other caregivers, a face-to-face encounter with the patient was not performed. All relevant records and diagnostic tests were reviewed, including laboratory results and imaging. Physical exam was performed by bedside nursing and confirmed during telemedicine rounds. Multi-organ system plan was formulated by the trauma team and conveyed to bedside nursing staff through telemedicine. Doing well. Awaiting DC plan.

## 2022-02-05 NOTE — PLAN OF CARE
Problem: Airway Clearance - Ineffective  Goal: Achieve or maintain patent airway  2/5/2022 0851 by Valencia Vera RN  Outcome: Ongoing  2/5/2022 0127 by Jaycee Valdes RN  Outcome: Ongoing     Problem: Gas Exchange - Impaired  Goal: Absence of hypoxia  2/5/2022 0851 by Valencia Vera RN  Outcome: Ongoing  2/5/2022 0127 by Jaycee Valdes RN  Outcome: Ongoing  Goal: Promote optimal lung function  2/5/2022 0851 by Valencia Vera RN  Outcome: Ongoing  2/5/2022 0127 by Jaycee Valdes RN  Outcome: Ongoing     Problem: Breathing Pattern - Ineffective  Goal: Ability to achieve and maintain a regular respiratory rate  2/5/2022 0851 by Valencia Vera RN  Outcome: Ongoing  2/5/2022 0127 by Jaycee Valdes RN  Outcome: Ongoing     Problem:  Body Temperature -  Risk of, Imbalanced  Goal: Ability to maintain a body temperature within defined limits  2/5/2022 0851 by Valencia Vera RN  Outcome: Ongoing  2/5/2022 0127 by Jaycee Valdes RN  Outcome: Ongoing  Goal: Will regain or maintain usual level of consciousness  2/5/2022 0851 by Valencia Vera RN  Outcome: Ongoing  2/5/2022 0127 by Jaycee Valdes RN  Outcome: Ongoing  Goal: Complications related to the disease process, condition or treatment will be avoided or minimized  2/5/2022 0851 by Valencia Vera RN  Outcome: Ongoing  2/5/2022 0127 by Jaycee Valdes RN  Outcome: Ongoing     Problem: Isolation Precautions - Risk of Spread of Infection  Goal: Prevent transmission of infection  2/5/2022 0851 by Valencia Vera RN  Outcome: Ongoing  2/5/2022 0127 by Jaycee Valdes RN  Outcome: Ongoing     Problem: Nutrition Deficits  Goal: Optimize nutritional status  2/5/2022 0851 by Valencia Vera RN  Outcome: Ongoing  2/5/2022 0127 by Jaycee Valdes RN  Outcome: Ongoing     Problem: Risk for Fluid Volume Deficit  Goal: Maintain normal heart rhythm  2/5/2022 0851 by Valencia Vera RN  Outcome: Ongoing  2/5/2022 0127 by Jaycee Valdes RN  Outcome: Ongoing  Goal: Maintain absence of muscle cramping  2/5/2022 0851 by Claudene Schlossman, RN  Outcome: Ongoing  2/5/2022 0127 by Marga Barlow RN  Outcome: Ongoing  Goal: Maintain normal serum potassium, sodium, calcium, phosphorus, and pH  2/5/2022 0851 by Claudene Schlossman, RN  Outcome: Ongoing  2/5/2022 0127 by Marga Barlow RN  Outcome: Ongoing     Problem: Loneliness or Risk for Loneliness  Goal: Demonstrate positive use of time alone when socialization is not possible  2/5/2022 0851 by Claudene Schlossman, RN  Outcome: Ongoing  2/5/2022 0127 by Marga Barlow RN  Outcome: Ongoing     Problem: Fatigue  Goal: Verbalize increase energy and improved vitality  2/5/2022 0851 by Claudene Schlossman, RN  Outcome: Ongoing  2/5/2022 0127 by Marga Barlow RN  Outcome: Ongoing     Problem: Patient Education: Go to Patient Education Activity  Goal: Patient/Family Education  2/5/2022 1071 by Claudene Schlossman, RN  Outcome: Ongoing  2/5/2022 0127 by Marga Barlow RN  Outcome: Ongoing     Problem: Pain:  Goal: Pain level will decrease  Description: Pain level will decrease  2/5/2022 0851 by Claudene Schlossman, RN  Outcome: Ongoing  2/5/2022 0127 by Marga Barlow RN  Outcome: Ongoing  Goal: Control of acute pain  Description: Control of acute pain  2/5/2022 0851 by Claudene Schlossman, RN  Outcome: Ongoing  2/5/2022 0127 by Marga Barlow RN  Outcome: Ongoing  Goal: Control of chronic pain  Description: Control of chronic pain  2/5/2022 0851 by Claudene Schlossman, RN  Outcome: Ongoing  2/5/2022 0127 by Marga Barlow RN  Outcome: Ongoing     Problem: Skin Integrity:  Goal: Will show no infection signs and symptoms  Description: Will show no infection signs and symptoms  2/5/2022 0851 by Claudene Schlossman, RN  Outcome: Ongoing  2/5/2022 0127 by Marga Barlow RN  Outcome: Ongoing  Goal: Absence of new skin breakdown  Description: Absence of new skin breakdown  2/5/2022 0851 by Claudene Schlossman, RN  Outcome: Ongoing  2/5/2022 0127 by Marga Barlow RN  Outcome: Ongoing     Problem: Falls - Risk of:  Goal: Will remain free from falls  Description: Will remain free from falls  2/5/2022 0851 by Latisha Khan RN  Outcome: Ongoing  2/5/2022 0127 by Meg Schaefer RN  Outcome: Ongoing  Goal: Absence of physical injury  Description: Absence of physical injury  2/5/2022 0851 by Latisha Khan RN  Outcome: Ongoing  2/5/2022 0127 by Meg Schaefer RN  Outcome: Ongoing     Problem: Nutrition  Goal: Optimal nutrition therapy  2/5/2022 0851 by Latisha Khan RN  Outcome: Ongoing  2/5/2022 0127 by Meg Schaefer RN  Outcome: Ongoing

## 2022-02-05 NOTE — PROGRESS NOTES
[facility] -   Infectious diseases evaluation  admission date 1/28/2022    reason for consultation:   covid    Impression :   Current:  · Left hip intertrochanteric fracture after a fall  ·      1/30  S/p Removal of deep orthopedic hardware left femur,                                Cephalomedullary  nail fixation left intertrochanteric femur fracture. · Covid positive, no hypoxemia  · Elevated CRP  · Right lower lobe atelectatic lesions, less likely Covid related  · Underlying COPD  Other:  ·   Discussion / summary of stay / plan of care   ·   Recommendations   · Low  CRP and ferritin  · sotrivimab for asymptomatic Covid in a high risk patient. Received 1/29. · 2 L NC for copd  · 2/1 O2 at 2 L NC which is baseline. Doing well. Okay to discharge from ID standpoint. · 2/4 Prophylactic Keflex x 14 days until 2/15/22 per Ortho. Isolate until 2/7/22  Infection Control Recommendations   · [place] Precautions  · Contact Isolation   · Airborne isolation  · Droplet Isolation      Antimicrobial Stewardship Recommendations   · Off ab    Coordination ofOutpatient Care:   · Estimated Length of IV antimicrobials:  · Patient will need Midline / picc Catheter Insertion:   · Patient will need SNF:  · Patient will need outpatient wound care:     History of Present Illness:   Initial history:  Giuseppe Naranjo is a 77y.o.-year-old male fell and broke his hip, left intertrochanteric, Ortho on board planning surgical repair. , found to have Covid positive, underlying COPD  Infectious consulted due to Covid positive    Patient on no oxygen  CT scan of the chest reviewed personally, right lower lobe small atelectasis areas, but cannot rule out Covid related-those lesions seem to be isolated in that area, possibly suggestive of atelectasis    Also has A. fib, and has a pacemaker, EF 50 and no valvular disease      1/31   Afebrile. SpO2 100% on 4 L NC.   Transfused with 1 unit PRBCs. No SOB. Resting. Cre increasing, 1.33    2/1  Afebrile. SpO2 100% on 2 L NC. Cre improving. 2/2  Afebrile. SpO2 99% on 3 L NC  Slightly SOB with activity. Pain controlled. Cre improved. 2/3  Low-grade fever 99. SpO2 96% on RA.    2/4  Afebrile. SpO2 97% on RA. WBC wnl. Interval changes  2/5/2022   Patient Vitals for the past 8 hrs:   BP Temp Temp src Pulse Resp SpO2   02/05/22 1200 (!) 90/49 98.4 °F (36.9 °C) Oral 70 16 100 %   02/05/22 1100 -- -- -- 70 15 99 %   02/05/22 1000 (!) 98/56 -- -- 70 13 100 %   02/05/22 0900 -- -- -- 75 17 99 %   02/05/22 0800 (!) 98/58 98.2 °F (36.8 °C) Oral 71 14 100 %   02/05/22 0700 -- -- -- 72 12 100 %     Afebrile  VS stable on room air  Borderline hypotansion  SpO2:99%    The patient is alert and oriented with no acute issues at this time. He is stable from an ID perspective    Discharge planning underway per Case Management    Summary of relevant labs:  Labs:  WBC: 5.5  Hgb: 7.5    Creat:0.65  CRP 15  Ferritin  274    Micro:  covid +    Imaging:  Chest x-ray negative    CT abdomen pelvis and chest  Right lower lobe small atelectasis, cannot rule out early COVID pneumonia        I have personally reviewed the past medical history, past surgical history, medications, social history, and family history, and I haveupdated the database accordingly. Allergies:   Darvocet [propoxyphene n-acetaminophen], Other, Oxycodone-acetaminophen, and Propoxyphene     Review of Systems:     Review of Systems   Constitutional: Positive for activity change. HENT: Negative for congestion. Eyes: Negative for discharge. Respiratory: Positive for shortness of breath. Negative for apnea and cough. Slightly SOB with activity. Cardiovascular: Negative for chest pain. Gastrointestinal: Negative for abdominal distention, diarrhea, nausea and vomiting. Endocrine: Negative for polydipsia. Genitourinary: Negative for dysuria.    Musculoskeletal: Positive for arthralgias. Skin: Negative for color change. Allergic/Immunologic: Negative for immunocompromised state. Neurological: Negative for dizziness. Hematological: Negative for adenopathy. Psychiatric/Behavioral: Negative for agitation. Physical Examination :       Physical Exam  Vitals and nursing note reviewed. Constitutional:       Appearance: Normal appearance. He is not ill-appearing. HENT:      Head: Normocephalic and atraumatic. Right Ear: External ear normal.      Left Ear: External ear normal.      Nose: Nose normal.      Mouth/Throat:      Mouth: Mucous membranes are moist.      Pharynx: Oropharynx is clear. Eyes:      General: No scleral icterus. Extraocular Movements: Extraocular movements intact. Conjunctiva/sclera: Conjunctivae normal.      Pupils: Pupils are equal, round, and reactive to light. Cardiovascular:      Rate and Rhythm: Normal rate and regular rhythm. Heart sounds: Normal heart sounds. No murmur heard. No friction rub. Pulmonary:      Effort: Pulmonary effort is normal. No respiratory distress. Breath sounds: No stridor. No rhonchi. Comments: Diminished t/o. Abdominal:      General: Bowel sounds are normal. There is no distension. Palpations: Abdomen is soft. There is no mass. Tenderness: There is no abdominal tenderness. Hernia: No hernia is present. Genitourinary:     Comments: No concepcion. Musculoskeletal:         General: No swelling or deformity. Cervical back: Normal range of motion and neck supple. No rigidity. Skin:     General: Skin is warm and dry. Capillary Refill: Capillary refill takes less than 2 seconds. Coloration: Skin is not jaundiced. Neurological:      Mental Status: He is alert and oriented to person, place, and time. Psychiatric:         Mood and Affect: Mood normal.         Behavior: Behavior normal.         Thought Content:  Thought content normal.         Past Medical History:     Past Medical History:   Diagnosis Date    Acute superficial gastritis without hemorrhage 05/26/2018    Anastomotic stricture of stomach     Asthma     Atrial fibrillation ([facility] [address]     On Xarelto 6/27/2020    Calculus of gallbladder without cholecystitis without obstruction 05/02/2017    Chronic diastolic heart failure ([facility] [address] 11/17/2017    Chronic rhinosinusitis 04/13/2015    Class 2 severe obesity due to excess calories with serious comorbidity and body mass index (BMI) of 36.0 to 36.9 in adult [facility]) 11/17/2017    COPD (chronic obstructive pulmonary disease) ([facility] [address] [facility]     E. coli septicemia (HCC)     E. coli UTI     Foot drop, right 07/10/2012    Fracture of femur ([facility] [address] 10/23/2016    Gait disorder 07/20/2016    Gastric out let obstruction     GERD (gastroesophageal reflux disease)     Hallux valgus, acquired, bilateral 06/24/2015    Hyperlipidemia     Hypertension     Impaired hearing 04/13/2015    Internal hemorrhoids 01/03/2017    Lymphedema of both lower extremities 06/24/2015    Nausea & vomiting 11/16/2018    OA (osteoarthritis) of knee, bilateral 12/11/2006    Obesity     MARIAMA on CPAP 05/02/2017    Osteoarthritis     Osteoarthritis of lumbar spine 12/13/2007     replace inactive diagnosis    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 coli (HCC)     Due to UTI     Sick sinus syndrome (HCC)     Simple chronic bronchitis ([facility] [address] 04/10/2018    Slow transit constipation 11/03/2016    Unspecified sleep apnea     UTI (urinary tract infection)        Past Surgical  History:     Past Surgical History:   Procedure Laterality Date    CARPAL TUNNEL RELEASE      bilateral    COLONOSCOPY      COLONOSCOPY N/A 04/05/2021    COLONOSCOPY DIAGNOSTIC performed by Briana Hoffmann MD at [address] [place] [address] [address]  01/02/2019    by Dr. Dollie Nyhan N/A 01/02/2019    CYSTOSCOPY performed by Joselin Perez MD at [address] Left 1/30/2022 [facility]    UPPER GASTROINTESTINAL ENDOSCOPY N/A 2018    EGD DILATION BALLOON performed by Alfreda Ocampo MD at [facility] ENDOSCOPY N/A 10/15/2020    EGD SUBMUCOSAL/BOTOX INJECTION tattoo  performed by Ingrid Torres MD at [facility] N/A 2021    EGD BIOPSY performed by Castillo Maguire MD at [facility] OR       Medications:      rivaroxaban  20 mg Oral Daily    metoprolol tartrate  25 mg Oral BID    docusate sodium  100 mg Oral Daily    cephALEXin  500 mg Oral 4x Daily    polyethylene glycol  17 g Oral BID    senna  1 tablet Oral Nightly    nystatin  5 mL Oral 4x Daily    acetaminophen  1,000 mg Oral 3 times per day    budesonide-formoterol  2 puff Inhalation BID    sodium chloride flush  5-40 mL IntraVENous 2 times per day    gabapentin  300 mg Oral Q8H    methocarbamol  750 mg Oral Q6H    bumetanide  2 mg Oral Daily    lipase-protease-amylase  24,000 Units Oral TID WC    rOPINIRole  0.25 mg Oral BID    pantoprazole  40 mg Oral QAM AC    tamsulosin  0.4 mg Oral Daily    ferrous sulfate  325 mg Oral Every Other Day       Social History:     Social History     Socioeconomic History    Marital status:      Spouse name: Not on file    Number of children: Not on file    Years of education: Not on file    Highest education level: Not on file   Occupational History    Not on file   Tobacco Use    Smoking status: Former Smoker     Packs/day: 1.00     Years: 20.00     Pack years: 20.00     Quit date: 1976     Years since quittin.1    Smokeless tobacco: Never Used   Vaping Use    Vaping Use: Never used   Substance and Sexual Activity    Alcohol use: No    Drug use: No    Sexual activity: Not on file   Other Topics Concern    Not on file   Social History Narrative    Not on file     Social Determinants of Health     Financial Resource Strain: Low Risk     Difficulty of Paying Living Expenses: Not hard at all   Food Insecurity: No Food Insecurity    Worried About [facility] in the Last Year: Never true    [place] of Food in the Last Year: Never true   Transportation Needs:     Lack of Transportation (Medical): Not on file    Lack of Transportation (Non-Medical): Not on file   Physical Activity:     Days of Exercise per Week: Not on file    Minutes of Exercise per Session: Not on file   Stress:     Feeling of Stress : Not on file   Social Connections:     Frequency of Communication with Friends and Family: Not on file    Frequency of Social Gatherings with Friends and Family: Not on file    Attends Ep[employer] Services: Not on file    Active Member of [address] [place] [address] or Organizations: Not on file    Attends Club or Organization Meetings: Not on file    Marital Status: Not on file   Intimate Partner Violence:     Fear of Current or Ex-Partner: Not on file    Emotionally Abused: Not on file    Physically Abused: Not on file    Sexually Abused: Not on file   Housing Stability:     Unable to Pay for Housing in the Last Year: Not on file    Number of [place] in the Last Year: Not on file    Unstable Housing in the Last Year: Not on file       Family History:     Family History   Problem Relation Age of Onset    Cancer Mother     Heart Attack Father       Medical Decision Making:   I have independently reviewed/ordered the following labs:    CBC with Differential:   Recent Labs     02/04/22  0500 02/05/22  0648   WBC 5.2 5.5   HGB 7.2* 7.5*   HCT 22.5* 23.3*    235   LYMPHOPCT 20* 18*   MONOPCT 19* 20*     BMP:  Recent Labs     02/03/22  0541   *   K 4.6   CL 98   CO2 22   BUN 17   CREATININE 0.65*     Hepatic Function Panel: No results for input(s): PROT, LABALBU, BILIDIR, IBILI, BILITOT, ALKPHOS, ALT, AST in the last 72 hours. No results for input(s): RPR in the last 72 hours. No results for input(s): HIV in the last 72 hours.   No results for input(s): BC in the last 72 hours. Lab Results   Component Value Date    CREATININE 0.65 02/03/2022    GLUCOSE 96 02/03/2022    GLUCOSE 99 09/13/2011       Detailed results: Thank you for allowing us to participate in the care of this patient. Please call with questions. This note is created with the assistance of a speech recognition program.  While intending to generate adocument that actually reflects the content of the visit, the document can still have some errors including those of syntax and sound a like substitutions which may escape proof reading. It such instances, actual meaningcan be extrapolated by contextual diversion. Ivan Rasmussen, APRN - CNP  Office: [phone]  Perfect serve / office [phone]        ATTESTATION:    I have discussed the case, including pertinent history and exam findings with the APRN. I have evaluated the  History, physical findings and pictures of the patient and the key elements of the encounter have been performed by me. I have reviewed the laboratory data, other diagnostic studies and discussed them with the APRN. I have updated the medical record where necessary. I agree with the assessment, plan and orders as documented by the APRN.     Linda Johnson MD.

## 2022-02-05 NOTE — CARE COORDINATION
Dispo planning     Attempted to call pt's room ([id number]) no answer. Update Haily AYERS still awaiting precert.

## 2022-02-06 LAB
ABSOLUTE EOS #: 0.21 K/UL (ref 0–0.44)
ABSOLUTE IMMATURE GRANULOCYTE: 0.07 K/UL (ref 0–0.3)
ABSOLUTE LYMPH #: 0.94 K/UL (ref 1.1–3.7)
ABSOLUTE MONO #: 1.23 K/UL (ref 0.1–1.2)
BASOPHILS # BLD: 1 % (ref 0–2)
BASOPHILS ABSOLUTE: 0.05 K/UL (ref 0–0.2)
DIFFERENTIAL TYPE: ABNORMAL
EOSINOPHILS RELATIVE PERCENT: 4 % (ref 1–4)
HCT VFR BLD CALC: 22.4 % (ref 40.7–50.3)
HEMOGLOBIN: 7.2 G/DL (ref 13–17)
IMMATURE GRANULOCYTES: 1 %
LYMPHOCYTES # BLD: 16 % (ref 24–43)
MCH RBC QN AUTO: 31.3 PG (ref 25.2–33.5)
MCHC RBC AUTO-ENTMCNC: 32.1 G/DL (ref 28.4–34.8)
MCV RBC AUTO: 97.4 FL (ref 82.6–102.9)
MONOCYTES # BLD: 21 % (ref 3–12)
NRBC AUTOMATED: 0 PER 100 WBC
PDW BLD-RTO: 15.3 % (ref 11.8–14.4)
PLATELET # BLD: 218 K/UL (ref 138–453)
PLATELET ESTIMATE: ABNORMAL
PMV BLD AUTO: 9.6 FL (ref 8.1–13.5)
RBC # BLD: 2.3 M/UL (ref 4.21–5.77)
RBC # BLD: ABNORMAL 10*6/UL
SEG NEUTROPHILS: 57 % (ref 36–65)
SEGMENTED NEUTROPHILS ABSOLUTE COUNT: 3.33 K/UL (ref 1.5–8.1)
WBC # BLD: 5.8 K/UL (ref 3.5–11.3)
WBC # BLD: ABNORMAL 10*3/UL

## 2022-02-06 PROCEDURE — 2580000003 HC RX 258: Performed by: STUDENT IN AN ORGANIZED HEALTH CARE EDUCATION/TRAINING PROGRAM

## 2022-02-06 PROCEDURE — APPSS30 APP SPLIT SHARED TIME 16-30 MINUTES: Performed by: NURSE PRACTITIONER

## 2022-02-06 PROCEDURE — 6370000000 HC RX 637 (ALT 250 FOR IP): Performed by: NURSE PRACTITIONER

## 2022-02-06 PROCEDURE — 6370000000 HC RX 637 (ALT 250 FOR IP): Performed by: STUDENT IN AN ORGANIZED HEALTH CARE EDUCATION/TRAINING PROGRAM

## 2022-02-06 PROCEDURE — 97530 THERAPEUTIC ACTIVITIES: CPT

## 2022-02-06 PROCEDURE — 36415 COLL VENOUS BLD VENIPUNCTURE: CPT

## 2022-02-06 PROCEDURE — 6370000000 HC RX 637 (ALT 250 FOR IP): Performed by: HEALTH CARE PROVIDER

## 2022-02-06 PROCEDURE — 85025 COMPLETE CBC W/AUTO DIFF WBC: CPT

## 2022-02-06 PROCEDURE — 1200000000 HC SEMI PRIVATE

## 2022-02-06 PROCEDURE — 94761 N-INVAS EAR/PLS OXIMETRY MLT: CPT

## 2022-02-06 PROCEDURE — 97110 THERAPEUTIC EXERCISES: CPT

## 2022-02-06 PROCEDURE — 99233 SBSQ HOSP IP/OBS HIGH 50: CPT | Performed by: INTERNAL MEDICINE

## 2022-02-06 PROCEDURE — 6360000002 HC RX W HCPCS: Performed by: HEALTH CARE PROVIDER

## 2022-02-06 RX ORDER — LIDOCAINE 4 G/G
1 PATCH TOPICAL DAILY
Status: DISCONTINUED | OUTPATIENT
Start: 2022-02-06 | End: 2022-02-08

## 2022-02-06 RX ORDER — FENTANYL CITRATE 50 UG/ML
25 INJECTION, SOLUTION INTRAMUSCULAR; INTRAVENOUS ONCE
Status: COMPLETED | OUTPATIENT
Start: 2022-02-06 | End: 2022-02-06

## 2022-02-06 RX ADMIN — METHOCARBAMOL TABLETS 750 MG: 750 TABLET, COATED ORAL at 12:05

## 2022-02-06 RX ADMIN — BUDESONIDE AND FORMOTEROL FUMARATE DIHYDRATE 2 PUFF: 160; 4.5 AEROSOL RESPIRATORY (INHALATION) at 09:28

## 2022-02-06 RX ADMIN — METHOCARBAMOL TABLETS 750 MG: 750 TABLET, COATED ORAL at 18:04

## 2022-02-06 RX ADMIN — PANCRELIPASE 24000 UNITS: 24000; 76000; 120000 CAPSULE, DELAYED RELEASE PELLETS ORAL at 09:27

## 2022-02-06 RX ADMIN — GABAPENTIN 300 MG: 300 CAPSULE ORAL at 05:04

## 2022-02-06 RX ADMIN — PANCRELIPASE 24000 UNITS: 24000; 76000; 120000 CAPSULE, DELAYED RELEASE PELLETS ORAL at 18:09

## 2022-02-06 RX ADMIN — TAMSULOSIN HYDROCHLORIDE 0.4 MG: 0.4 CAPSULE ORAL at 09:28

## 2022-02-06 RX ADMIN — METOPROLOL TARTRATE 25 MG: 25 TABLET ORAL at 20:57

## 2022-02-06 RX ADMIN — SODIUM CHLORIDE, PRESERVATIVE FREE 10 ML: 5 INJECTION INTRAVENOUS at 20:57

## 2022-02-06 RX ADMIN — Medication 500000 UNITS: at 20:57

## 2022-02-06 RX ADMIN — CEPHALEXIN 500 MG: 500 CAPSULE ORAL at 09:27

## 2022-02-06 RX ADMIN — PANCRELIPASE 24000 UNITS: 24000; 76000; 120000 CAPSULE, DELAYED RELEASE PELLETS ORAL at 12:05

## 2022-02-06 RX ADMIN — CEPHALEXIN 500 MG: 500 CAPSULE ORAL at 20:56

## 2022-02-06 RX ADMIN — FENTANYL CITRATE 25 MCG: 50 INJECTION, SOLUTION INTRAMUSCULAR; INTRAVENOUS at 15:05

## 2022-02-06 RX ADMIN — BUMETANIDE 2 MG: 1 TABLET ORAL at 09:27

## 2022-02-06 RX ADMIN — GABAPENTIN 300 MG: 300 CAPSULE ORAL at 14:04

## 2022-02-06 RX ADMIN — Medication 500000 UNITS: at 18:04

## 2022-02-06 RX ADMIN — SODIUM CHLORIDE, PRESERVATIVE FREE 10 ML: 5 INJECTION INTRAVENOUS at 09:28

## 2022-02-06 RX ADMIN — GABAPENTIN 300 MG: 300 CAPSULE ORAL at 22:01

## 2022-02-06 RX ADMIN — ACETAMINOPHEN 1000 MG: 325 TABLET ORAL at 05:04

## 2022-02-06 RX ADMIN — ACETAMINOPHEN 1000 MG: 325 TABLET ORAL at 14:04

## 2022-02-06 RX ADMIN — ROPINIROLE HYDROCHLORIDE 0.25 MG: 0.25 TABLET, FILM COATED ORAL at 20:57

## 2022-02-06 RX ADMIN — ROPINIROLE HYDROCHLORIDE 0.25 MG: 0.25 TABLET, FILM COATED ORAL at 09:28

## 2022-02-06 RX ADMIN — POLYETHYLENE GLYCOL 3350 17 G: 17 POWDER, FOR SOLUTION ORAL at 20:56

## 2022-02-06 RX ADMIN — SENNOSIDES 8.6 MG: 8.6 TABLET, COATED ORAL at 21:04

## 2022-02-06 RX ADMIN — DOCUSATE SODIUM 100 MG: 100 CAPSULE ORAL at 09:28

## 2022-02-06 RX ADMIN — Medication 500000 UNITS: at 13:11

## 2022-02-06 RX ADMIN — METHOCARBAMOL TABLETS 750 MG: 750 TABLET, COATED ORAL at 05:04

## 2022-02-06 RX ADMIN — CEPHALEXIN 500 MG: 500 CAPSULE ORAL at 13:11

## 2022-02-06 RX ADMIN — ACETAMINOPHEN 1000 MG: 325 TABLET ORAL at 21:00

## 2022-02-06 RX ADMIN — FERROUS SULFATE TAB EC 325 MG (65 MG FE EQUIVALENT) 325 MG: 325 (65 FE) TABLET DELAYED RESPONSE at 09:27

## 2022-02-06 RX ADMIN — RIVAROXABAN 20 MG: 20 TABLET, FILM COATED ORAL at 18:04

## 2022-02-06 RX ADMIN — METOPROLOL TARTRATE 25 MG: 25 TABLET ORAL at 09:27

## 2022-02-06 RX ADMIN — CEPHALEXIN 500 MG: 500 CAPSULE ORAL at 18:04

## 2022-02-06 RX ADMIN — PANTOPRAZOLE SODIUM 40 MG: 40 TABLET, DELAYED RELEASE ORAL at 05:04

## 2022-02-06 RX ADMIN — BUDESONIDE AND FORMOTEROL FUMARATE DIHYDRATE 2 PUFF: 160; 4.5 AEROSOL RESPIRATORY (INHALATION) at 20:54

## 2022-02-06 RX ADMIN — Medication 500000 UNITS: at 09:27

## 2022-02-06 ASSESSMENT — ENCOUNTER SYMPTOMS
EYE DISCHARGE: 0
APNEA: 0
SHORTNESS OF BREATH: 1
NAUSEA: 0
DIARRHEA: 0
COUGH: 0
ABDOMINAL DISTENTION: 0
VOMITING: 0
COLOR CHANGE: 0

## 2022-02-06 ASSESSMENT — PAIN SCALES - GENERAL
PAINLEVEL_OUTOF10: 5
PAINLEVEL_OUTOF10: 8
PAINLEVEL_OUTOF10: 7
PAINLEVEL_OUTOF10: 7
PAINLEVEL_OUTOF10: 8
PAINLEVEL_OUTOF10: 7
PAINLEVEL_OUTOF10: 9
PAINLEVEL_OUTOF10: 5

## 2022-02-06 ASSESSMENT — PAIN DESCRIPTION - PAIN TYPE: TYPE: ACUTE PAIN;SURGICAL PAIN

## 2022-02-06 ASSESSMENT — PAIN DESCRIPTION - ORIENTATION
ORIENTATION: LEFT
ORIENTATION: LEFT

## 2022-02-06 ASSESSMENT — PAIN DESCRIPTION - LOCATION
LOCATION: KNEE
LOCATION: KNEE

## 2022-02-06 ASSESSMENT — PAIN DESCRIPTION - PROGRESSION: CLINICAL_PROGRESSION: NOT CHANGED

## 2022-02-06 ASSESSMENT — PAIN DESCRIPTION - DESCRIPTORS: DESCRIPTORS: SHARP;SORE

## 2022-02-06 ASSESSMENT — PAIN - FUNCTIONAL ASSESSMENT: PAIN_FUNCTIONAL_ASSESSMENT: PREVENTS OR INTERFERES SOME ACTIVE ACTIVITIES AND ADLS

## 2022-02-06 ASSESSMENT — PAIN DESCRIPTION - FREQUENCY: FREQUENCY: INTERMITTENT

## 2022-02-06 ASSESSMENT — PAIN DESCRIPTION - ONSET: ONSET: ON-GOING

## 2022-02-06 NOTE — PROGRESS NOTES
Orthopedic Progress Note    Patient:  Bg Pabon, 77 y.o. male  YOB: 1945       Subjective:  Patient seen and examined. POD7 from CMN/HWR/poly exchange left femur. No complaints or concerns. Still on Covid precautions No issue with the prevena overnight, still hooked up to suction. Pain controlled on current regimen. Denies fever, HA, CP, SOB, N/V. Tolerating PO intake. Walked 3' with PT. Still max assist.    Objective:   Vitals:    02/06/22 0400   BP: 109/68   Pulse: 71   Resp: 14   Temp: 98.6 °F (37 °C)   SpO2: 100%     Gen: NAD, cooperative    Cardiovascular: Regular rate    Respiratory: Symmetric chest rise. No accessory muscle use    LLE: Prevena in place with adequate suction. No output in the cannister still. Remaining dressings about the left hip are c/d/i. Compartments soft. EHL/FHL/TA/GSC motor intact. Sensation intact to sural/saph/SPN/DPN distribution. DP/PT pulses 2+. Recent Labs     02/06/22  0656   WBC 5.8   HGB 7.2*   HCT 22.4*         Anticoag: Xarelto  Abx: Keflex    Impression/Plan: 77 y.o. male who [address], with:    1. Left IT fracture, POD7 from HWR/CMN insertion, and poly exchange      - Prevena in place and plugged into the wall outlet. Ensure appropriate suction is maintained throughout the day. Please page Ortho if any malfunction is noted. No output in canister. Please continue to record any output  - Continue prophylactic antibiotics Keflex 500 mg QID (2/2 start date). Patient to receive a total of 14 days of Keflex. Please ensure patient is discharged with remaining days of Keflex to total 14 days of antibiotic treatment which started on 2/2/2022  - Soft dressings to the left femur. Change as needed per nursing staff  - WBAT LLE   - Continue medical management per primary.  Ok to resume chemical AC from ortho standpoint   - PT/OT to continue to evaluate and treat  - OK for discharge from ortho perspective and to follow up with Dr. Mj Henson 10-14 days from date of surgery     Electronically signed by Casi Freeman DO, on 2/6/2022 at 9:22 AM.

## 2022-02-06 NOTE — PROGRESS NOTES
Physical Therapy  Facility/Department: [facility] CAR 2  Daily Treatment Note  NAME: Waynette Shone  : 1945  MRN: [id number]    Date of Service: 2022    Discharge Recommendations:  Patient would benefit from continued therapy after discharge   PT Equipment Recommendations  Equipment Needed:  (CTA)    Assessment   Assessment: Pt required modA for bed mobility with HOB elevated with increased time/effort. Sit<->stand transfers with ModA and verbal cues to maintain upright posture in rg stedy. Pt able to tolerate weight bearing through LLE, but unable to progress either LE. Pt would be unsafe to return to prior living arrangements upon discharge. Pt would benefit from additional intense PT to address deficits. Prognosis: Good  PT Education: Goals;PT Role;General Safety;Weight-bearing Education;Plan of Care;Gait Training;Functional Mobility Training;Transfer Training  REQUIRES PT FOLLOW UP: Yes  Activity Tolerance  Activity Tolerance: Patient limited by endurance; Patient limited by pain     Patient Diagnosis(es): The encounter diagnosis was Left displaced femoral neck fracture ([facility]      has a past medical history of Acute superficial gastritis without hemorrhage, Anastomotic stricture of stomach, Asthma, Atrial fibrillation ([facility] Calculus of gallbladder without cholecystitis without obstruction, Chronic diastolic heart failure (HCC), Chronic rhinosinusitis, Class 2 severe obesity due to excess calories with serious comorbidity and body mass index (BMI) of 36.0 to 36.9 in [facility]), COPD (chronic obstructive pulmonary disease) ([facility] E. coli septicemia ([facility] E. coli UTI, Foot drop, right, Fracture of femur ([facility] Gait disorder, Gastric out let obstruction, GERD (gastroesophageal reflux disease), Hallux valgus, acquired, bilateral, Hyperlipidemia, Hypertension, Impaired hearing, Internal hemorrhoids, Lymphedema of both lower extremities, Nausea & vomiting, OA (osteoarthritis) of knee, bilateral, Obesity, wears BLE AFOs at baseline d/t dropfoot; wound vac to L knee, watch BP and HR  Subjective   General  Chart Reviewed: Yes  Response To Previous Treatment: Patient with no complaints from previous session. Family / Caregiver Present: No  Subjective  Subjective: RN and Pt  agreeable to therapy. Pt supine in bed upon arrival. Pt pleasant and cooperative throughout today's session. General Comment  Comments: Pt left in recliner with call light within reach, alarm activated  Pain Screening  Patient Currently in Pain: Yes  Pain Assessment  Pain Assessment: 0-10  Pain Level: 8  Pain Type: Acute pain;Surgical pain  Pain Location: Knee  Pain Orientation: Left  Pain Descriptors: [address]; Sore  Pain Frequency: Intermittent  Pain Onset: On-going  Clinical Progression: Not changed  Functional Pain Assessment: Prevents or interferes some active activities and ADLs  Non-Pharmaceutical Pain Intervention(s): Ambulation/Increased Activity;Repositioned  Vital Signs  Patient Currently in Pain: Yes       Orientation  Orientation  Overall Orientation Status: Within Normal Limits  Cognition      Objective   Bed mobility  Rolling to Right: Minimal assistance  Supine to Sit: Moderate assistance  Sit to Supine:  (pt left seated in recliner)  Scooting: Moderate assistance  Comment: HOB elevated  Transfers  Sit to Stand: Moderate Assistance  Stand to sit: Moderate Assistance  Bed to Chair: Dependent/Total (rg abdi)  Comment: transfers performed with SS, verbal cues for upright posture and hand placement.   Ambulation  Ambulation?: No  Stairs/Curb  Stairs?: No     Balance  Posture: Fair  Sitting - Static: Good;-  Sitting - Dynamic: Fair;+  Standing - Static: Fair  Comments: standing balance assessed while using a SS  Exercises  Quad Sets: x 10 reps bilat  Heelslides: x 10 reps AAROM to L LE and AROM to R LE  Gluteal Sets: x 10 reps  Hip Abduction: x 10 reps AAROM to L LE and AROM to R LE  Knee Short Arc Quad: x 10 reps AAROM to L LE and AROM to R LE  Ankle Pumps: x 10 reps limited ROM  Comments: Pt sat at EOB x 10 minutes with CGA      AM-PAC Score  AM-PAC Inpatient Mobility Raw Score : 12 (02/05/22 1650)  AM-PAC Inpatient T-Scale Score : 35.33 (02/05/22 1650)  Mobility Inpatient CMS 0-100% Score: 68.66 (02/05/22 1650)  Mobility Inpatient CMS G-Code Modifier : CL (02/05/22 1650)    Goals  Short term goals  Time Frame for Short term goals: 14 visits  Short term goal 1: Pt will ambulate 125 feet with least restrictive device and CGA to increase functional independence. Short term goal 2: Pt will tolerate a 35 minute therapy session to promote increased endurance. Short term goal 3: Pt will demonstrate good- standing balance to decrease fall risk. Short term goal 4: Pt will negotiate 5 stairs with no handrails and a SPC to allow the pt to enter prior living arrangements. Short term goal 5: Pt will perform sit<>stand transfer with SBA to increase functional independence.     Plan    Plan  Times per week: 6-7  Times per day: Daily  Current Treatment Recommendations: Strengthening,Transfer Training,Endurance Training,ROM,Balance Training,Gait Training,Functional Mobility Training,Stair training,Safety Education & Training,Home Exercise Program,Equipment Evaluation, Education, & procurement,Patient/Caregiver Education & Training  Safety Devices  Type of devices: Nurse notified,All fall risk precautions in place,Gait belt,Call light within reach,Left in bed  Restraints  Initially in place: No     Therapy Time   Individual Concurrent Group Co-treatment   Time In 1430         Time Out 1457         Minutes 27         Timed Code Treatment Minutes: [address] [place], [place]

## 2022-02-06 NOTE — PROGRESS NOTES
[facility] -   Infectious diseases evaluation  admission date 1/28/2022    reason for consultation:   covid    Impression :   Current:  · Left hip intertrochanteric fracture after a fall  ·      1/30  S/p Removal of deep orthopedic hardware left femur,                                Cephalomedullary  nail fixation left intertrochanteric femur fracture. · Covid positive, no hypoxemia  · Elevated CRP  · Right lower lobe atelectatic lesions, less likely Covid related  · Underlying COPD  Other:  ·   Discussion / summary of stay / plan of care   ·   Recommendations   · Low  CRP and ferritin  · sotrivimab for asymptomatic Covid in a high risk patient. Received 1/29. · 2 L NC for copd  · 2/1 O2 at 2 L NC which is baseline. Doing well. Okay to discharge from ID standpoint. · 2/4 Prophylactic Keflex x 14 days until 2/15/22 per Ortho. Isolate until 2/7/22  Infection Control Recommendations   · [place] Precautions  · Contact Isolation   · Airborne isolation  · Droplet Isolation      Antimicrobial Stewardship Recommendations   · Off ab    Coordination ofOutpatient Care:   · Estimated Length of IV antimicrobials:  · Patient will need Midline / picc Catheter Insertion:   · Patient will need SNF:  · Patient will need outpatient wound care:     History of Present Illness:   Initial history:  Sinai Vicente is a 77y.o.-year-old male fell and broke his hip, left intertrochanteric, Ortho on board planning surgical repair. , found to have Covid positive, underlying COPD  Infectious consulted due to Covid positive    Patient on no oxygen  CT scan of the chest reviewed personally, right lower lobe small atelectasis areas, but cannot rule out Covid related-those lesions seem to be isolated in that area, possibly suggestive of atelectasis    Also has A. fib, and has a pacemaker, EF 50 and no valvular disease      1/31   Afebrile. SpO2 100% on 4 L NC.   Transfused with 1 unit PRBCs. No SOB. Resting. Cre increasing, 1.33    2/1  Afebrile. SpO2 100% on 2 L NC. Cre improving. 2/2  Afebrile. SpO2 99% on 3 L NC  Slightly SOB with activity. Pain controlled. Cre improved. 2/3  Low-grade fever 99. SpO2 96% on RA.    2/4  Afebrile. SpO2 97% on RA. WBC wnl. Interval changes  2/6/2022   Patient Vitals for the past 8 hrs:   BP Temp Temp src Pulse Resp SpO2   02/06/22 1019 -- -- -- 70 16 100 %   02/06/22 0845 (!) 103/56 98.3 °F (36.8 °C) Oral 76 14 97 %     Afebrile  VS stable on room air  SpO2:95%    The patient continues to do well. He has some continued weakness. He is stable from an ID perspective    He will follow-up with Ortho after discharge. Discharge planning underway per Case Management    Summary of relevant labs:  Labs:  WBC: 5.5-->5.8  Hgb: 7.5-->7.2    Creat:0.65  CRP 15  Ferritin  274    Micro:  covid +    Imaging:  Chest x-ray negative    CT abdomen pelvis and chest  Right lower lobe small atelectasis, cannot rule out early COVID pneumonia        I have personally reviewed the past medical history, past surgical history, medications, social history, and family history, and I haveupdated the database accordingly. Allergies:   Darvocet [propoxyphene n-acetaminophen], Other, Oxycodone-acetaminophen, and Propoxyphene     Review of Systems:     Review of Systems   Constitutional: Positive for activity change. HENT: Negative for congestion. Eyes: Negative for discharge. Respiratory: Positive for shortness of breath. Negative for apnea and cough. Slightly SOB with activity. Cardiovascular: Negative for chest pain. Gastrointestinal: Negative for abdominal distention, diarrhea, nausea and vomiting. Endocrine: Negative for polydipsia. Genitourinary: Negative for dysuria. Musculoskeletal: Positive for arthralgias. Skin: Negative for color change. Allergic/Immunologic: Negative for immunocompromised state.    Neurological: Negative for dizziness. Hematological: Negative for adenopathy. Psychiatric/Behavioral: Negative for agitation. Physical Examination :       Physical Exam  Vitals and nursing note reviewed. Constitutional:       Appearance: Normal appearance. He is not ill-appearing. HENT:      Head: Normocephalic and atraumatic. Right Ear: External ear normal.      Left Ear: External ear normal.      Nose: Nose normal.      Mouth/Throat:      Mouth: Mucous membranes are moist.      Pharynx: Oropharynx is clear. Eyes:      General: No scleral icterus. Extraocular Movements: Extraocular movements intact. Conjunctiva/sclera: Conjunctivae normal.      Pupils: Pupils are equal, round, and reactive to light. Cardiovascular:      Rate and Rhythm: Normal rate and regular rhythm. Heart sounds: Normal heart sounds. No murmur heard. No friction rub. Pulmonary:      Effort: Pulmonary effort is normal. No respiratory distress. Breath sounds: No stridor. No rhonchi. Comments: Diminished t/o. Abdominal:      General: Bowel sounds are normal. There is no distension. Palpations: Abdomen is soft. There is no mass. Tenderness: There is no abdominal tenderness. Hernia: No hernia is present. Genitourinary:     Comments: No concepcion. Musculoskeletal:         General: No swelling or deformity. Cervical back: Normal range of motion and neck supple. No rigidity. Skin:     General: Skin is warm and dry. Capillary Refill: Capillary refill takes less than 2 seconds. Coloration: Skin is not jaundiced. Neurological:      Mental Status: He is alert and oriented to person, place, and time. Psychiatric:         Mood and Affect: Mood normal.         Behavior: Behavior normal.         Thought Content:  Thought content normal.         Past Medical History:     Past Medical History:   Diagnosis Date    Acute superficial gastritis without hemorrhage 05/26/2018    Anastomotic stricture of stomach  Asthma     Atrial fibrillation ([facility]     On Xarelto 6/27/2020    Calculus of gallbladder without cholecystitis without obstruction 05/02/2017    Chronic diastolic heart failure ([facility] 11/17/2017    Chronic rhinosinusitis 04/13/2015    Class 2 severe obesity due to excess calories with serious comorbidity and body mass index (BMI) of 36.0 to 36.9 in adult [facility]) 11/17/2017    COPD (chronic obstructive pulmonary disease) ([facility]     E. coli septicemia (HCC)     E. coli UTI     Foot drop, right 07/10/2012    Fracture of femur ([facility] 10/23/2016    Gait disorder 07/20/2016    Gastric out let obstruction     GERD (gastroesophageal reflux disease)     Hallux valgus, acquired, bilateral 06/24/2015    Hyperlipidemia     Hypertension     Impaired hearing 04/13/2015    Internal hemorrhoids 01/03/2017    Lymphedema of both lower extremities 06/24/2015    Nausea & vomiting 11/16/2018    OA (osteoarthritis) of knee, bilateral 12/11/2006    Obesity     MARIAMA on CPAP 05/02/2017    Osteoarthritis     Osteoarthritis of lumbar spine 12/13/2007     replace inactive diagnosis    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 coli (HCC)     Due to UTI     Sick sinus syndrome (HCC)     Simple chronic bronchitis ([facility] 04/10/2018    Slow transit constipation 11/03/2016    Unspecified sleep apnea     UTI (urinary tract infection)        Past Surgical  History:     Past Surgical History:   Procedure Laterality Date    CARPAL TUNNEL RELEASE      bilateral    COLONOSCOPY      COLONOSCOPY N/A 04/05/2021    COLONOSCOPY DIAGNOSTIC performed by Nupur Marroquin MD at [address]  01/02/2019    by Dr. Mert Aparicio N/A 01/02/2019    CYSTOSCOPY performed by Yi Roth MD at  [address] Left 1/30/2022    LEFT CEPHALOMEDULLARY NAIL INSERTION performed by Alexus Champion DO at [facility]    first knuckle right index finger    GASTRIC BYPASS SURGERY  1985    vertical banded gastroplasty    HEMORRHOID SURGERY      HIP SURGERY Left 01/30/2022     LEFT CEPHALOMEDULLARY NAIL INSERTION    HIP SURGERY Left 01/30/2022    LEFT CEPHALOMEDULLARY NAIL INSERTION (Left Hip)     JOINT REPLACEMENT  2004 & 2005    bilateral knees    KNEE SURGERY Left 01/30/2022    KNEE TOTAL ARTHROPLASTY REVISION, REMOVAL OF INTRAMEDULLARY NAIL     PACEMAKER INSERTION  04/09/2021    [place], placed by Dr. Rose Mary Nash EGD [address] N/A 08/16/2018    EGD FOREIGN BODY REMOVAL performed by Rossy Mac MD at [address] [facility] EGD TRANSORAL BIOPSY SINGLE/MULTIPLE N/A 05/25/2018    EGD BIOPSY performed by Renny Rodgers DO at [address] Left 01/30/2022    KNEE TOTAL ARTHROPLASTY REVISION, REMOVAL OF INTRAMEDULLARY NAIL (Left     REVISION TOTAL KNEE ARTHROPLASTY Left 1/30/2022    KNEE TOTAL ARTHROPLASTY REVISION, REMOVAL OF INTRAMEDULLARY NAIL performed by Sean Myers DO at [address] [place] [address]      left shoulder    UPPER GASTROINTESTINAL ENDOSCOPY  08/13/2018    removal of food bolus    UPPER GASTROINTESTINAL ENDOSCOPY N/A 08/13/2018    EGD FOREIGN BODY REMOVAL performed by Renny Rodgers DO at [address] N/A 08/13/2018    EGD BIOPSY performed by Renny Rodgers DO at [address]  08/16/2018    egd with balloon dilitation    UPPER GASTROINTESTINAL ENDOSCOPY  08/16/2018    EGD DILATION BALLOON performed by Rossy Mac MD at [facility] 10/01/2018    EGD BIOPSY performed by Sheila Sands MD at [address] [place] [address]  10/01/2018    EGD DILATION BALLOON performed by Sheila Sands MD at [address] [place] [address] N/A 11/19/2018    EGD DILATION BALLOON performed by Sheila Sands MD at [address] [place] [address] UPPER GASTROINTESTINAL ENDOSCOPY N/A 10/15/2020    EGD SUBMUCOSAL/BOTOX INJECTION tattoo  performed by Barbara Galeas MD at [facility] N/A 2021    EGD BIOPSY performed by Neeraj Parikh MD at [facility] OR       Medications:      rivaroxaban  20 mg Oral Daily    metoprolol tartrate  25 mg Oral BID    docusate sodium  100 mg Oral Daily    cephALEXin  500 mg Oral 4x Daily    polyethylene glycol  17 g Oral BID    senna  1 tablet Oral Nightly    nystatin  5 mL Oral 4x Daily    acetaminophen  1,000 mg Oral 3 times per day    budesonide-formoterol  2 puff Inhalation BID    sodium chloride flush  5-40 mL IntraVENous 2 times per day    gabapentin  300 mg Oral Q8H    methocarbamol  750 mg Oral Q6H    bumetanide  2 mg Oral Daily    lipase-protease-amylase  24,000 Units Oral TID WC    rOPINIRole  0.25 mg Oral BID    pantoprazole  40 mg Oral QAM AC    tamsulosin  0.4 mg Oral Daily    ferrous sulfate  325 mg Oral Every Other Day       Social History:     Social History     Socioeconomic History    Marital status:      Spouse name: Not on file    Number of children: Not on file    Years of education: Not on file    Highest education level: Not on file   Occupational History    Not on file   Tobacco Use    Smoking status: Former Smoker     Packs/day: 1.00     Years: 20.00     Pack years: 20.00     Quit date: 1976     Years since quittin.1    Smokeless tobacco: Never Used   Vaping Use    Vaping Use: Never used   Substance and Sexual Activity    Alcohol use: No    Drug use: No    Sexual activity: Not on file   Other Topics Concern    Not on file   Social History Narrative    Not on file     Social Determinants of Health     Financial Resource Strain: Low Risk     Difficulty of Paying Living Expenses: Not hard at all   Food Insecurity: No Food Insecurity    Worried About Running Out of Food in the Last Year: Never true   [employer] of Food in the Last Year: Never true   Transportation Needs:     Lack of Transportation (Medical): Not on file    Lack of Transportation (Non-Medical): Not on file   Physical Activity:     Days of Exercise per Week: Not on file    Minutes of Exercise per Session: Not on file   Stress:     Feeling of Stress : Not on file   Social Connections:     Frequency of Communication with Friends and Family: Not on file    Frequency of Social Gatherings with Friends and Family: Not on file    Attends [employer] Services: Not on file    Active Member of [address] [place] [address] sourceasy or Organizations: Not on file    Attends Club or Organization Meetings: Not on file    Marital Status: Not on file   Intimate Partner Violence:     Fear of Current or Ex-Partner: Not on file    Emotionally Abused: Not on file    Physically Abused: Not on file    Sexually Abused: Not on file   Housing Stability:     Unable to Pay for Housing in the Last Year: Not on file    Number of [place] in the Last Year: Not on file    Unstable Housing in the Last Year: Not on file       Family History:     Family History   Problem Relation Age of Onset    Cancer Mother     Heart Attack Father       Medical Decision Making:   I have independently reviewed/ordered the following labs:    CBC with Differential:   Recent Labs     02/05/22  0648 02/06/22  0656   WBC 5.5 5.8   HGB 7.5* 7.2*   HCT 23.3* 22.4*    218   LYMPHOPCT 18* 16*   MONOPCT 20* 21*     BMP:  No results for input(s): NA, K, CL, CO2, BUN, CREATININE, CA, MG in the last 72 hours. Hepatic Function Panel: No results for input(s): PROT, LABALBU, BILIDIR, IBILI, BILITOT, ALKPHOS, ALT, AST in the last 72 hours. No results for input(s): RPR in the last 72 hours. No results for input(s): HIV in the last 72 hours. No results for input(s): BC in the last 72 hours.   Lab Results   Component Value Date    CREATININE 0.65 02/03/2022    GLUCOSE 96 02/03/2022    GLUCOSE 99 09/13/2011       Detailed results: Thank you for allowing us to participate in the care of this patient. Please call with questions. This note is created with the assistance of a speech recognition program.  While intending to generate adocument that actually reflects the content of the visit, the document can still have some errors including those of syntax and sound a like substitutions which may escape proof reading. It such instances, actual meaningcan be extrapolated by contextual diversion. Rachel Lopez, JULIAN - CNP  Office: [phone]  Perfect serve / office [phone]    ATTESTATION:    I have discussed the case, including pertinent history and exam findings with the APRN. I have evaluated the  History, physical findings and pictures of the patient and the key elements of the encounter have been performed by me. I have reviewed the laboratory data, other diagnostic studies and discussed them with the APRN. I have updated the medical record where necessary. I agree with the assessment, plan and orders as documented by the APRN.     Deonte Adames MD.

## 2022-02-06 NOTE — PLAN OF CARE
Problem: Airway Clearance - Ineffective  Goal: Achieve or maintain patent airway  2/6/2022 1138 by Jose Antonio Myles RN  Outcome: Ongoing  2/6/2022 1138 by Jose Antonio Myles RN  Outcome: Ongoing  2/6/2022 0325 by Beverley Martin RN  Outcome: Ongoing     Problem: Gas Exchange - Impaired  Goal: Absence of hypoxia  2/6/2022 1138 by Jose Antonio Myles RN  Outcome: Ongoing  2/6/2022 1138 by Jose Antonio Myles RN  Outcome: Ongoing  2/6/2022 0325 by Beverley Martin RN  Outcome: Ongoing  Goal: Promote optimal lung function  2/6/2022 1138 by Jose Antonio Myles RN  Outcome: Ongoing  2/6/2022 1138 by Jose Antonio Myles RN  Outcome: Ongoing  2/6/2022 0325 by Beverley Martin RN  Outcome: Ongoing     Problem: Gas Exchange - Impaired  Goal: Promote optimal lung function  2/6/2022 1138 by Jose Antonio Myles RN  Outcome: Ongoing  2/6/2022 1138 by Jose Antonio Myles RN  Outcome: Ongoing  2/6/2022 0325 by Beverley Martin RN  Outcome: Ongoing     Problem: Breathing Pattern - Ineffective  Goal: Ability to achieve and maintain a regular respiratory rate  2/6/2022 1138 by Jose Antonio Myles RN  Outcome: Ongoing  2/6/2022 1138 by Jose Antonio Myles RN  Outcome: Ongoing  2/6/2022 0325 by Beverley Martin RN  Outcome: Ongoing     Problem:  Body Temperature -  Risk of, Imbalanced  Goal: Ability to maintain a body temperature within defined limits  2/6/2022 1138 by Jose Antonio Myles RN  Outcome: Ongoing  2/6/2022 0325 by Beverley Martin RN  Outcome: Ongoing  Goal: Will regain or maintain usual level of consciousness  2/6/2022 1138 by Jose Antonio Myles RN  Outcome: Ongoing  2/6/2022 0325 by Beverley Martin RN  Outcome: Ongoing  Goal: Complications related to the disease process, condition or treatment will be avoided or minimized  2/6/2022 1138 by Jose Antonio Myles RN  Outcome: Ongoing  2/6/2022 0325 by Beverley Martin RN  Outcome: Ongoing     Problem: Isolation Precautions - Risk of Spread of Infection  Goal: Prevent transmission of infection  2/6/2022 1138 by Jose Antonio Myles Problem: Skin Integrity:  Goal: Will show no infection signs and symptoms  Description: Will show no infection signs and symptoms  2/6/2022 1138 by Abdulkadir Harris RN  Outcome: Ongoing  2/6/2022 0325 by García Patel RN  Outcome: Ongoing  Goal: Absence of new skin breakdown  Description: Absence of new skin breakdown  2/6/2022 1138 by Abdulkadir Harris RN  Outcome: Ongoing  2/6/2022 0325 by García Patel RN  Outcome: Ongoing     Problem: Falls - Risk of:  Goal: Will remain free from falls  Description: Will remain free from falls  2/6/2022 1138 by Abdulkadir Harris RN  Outcome: Ongoing  2/6/2022 0325 by García Patel RN  Outcome: Ongoing  Goal: Absence of physical injury  Description: Absence of physical injury  2/6/2022 1138 by Abdulkadir Harris RN  Outcome: Ongoing  2/6/2022 0325 by García Patel RN  Outcome: Ongoing     Problem: Nutrition  Goal: Optimal nutrition therapy  2/6/2022 1138 by Abdulkadir Harris RN  Outcome: Ongoing  2/6/2022 0325 by García Patel RN  Outcome: Ongoing

## 2022-02-06 NOTE — PROGRESS NOTES
PROGRESS NOTE      PATIENT NAME: Ranjana Lyons RECORD NO. [id number]  DATE: 2/6/2022  PRIMARY CARE PHYSICIAN: Williams Li, APRN - CNP    HD: # 8    ASSESSMENT    Patient Active Problem List   Diagnosis    Chronic atrial fibrillation (HCC)    Dyslipidemia    Gastroesophageal reflux disease without esophagitis    Osteoarthritis of lumbar spine    OA (osteoarthritis) of knee, bilateral    Foot drop, right    Hallux valgus, acquired, bilateral    Hearing impairment    Essential hypertension    Slow transit constipation    MARIAMA on CPAP    COPD (chronic obstructive pulmonary disease) (HCC)    Chronic diastolic heart failure (HCC)    History of bariatric surgery including banding leading to esophageal stricture and aspiration    BPH (benign prostatic hyperplasia)    Myalgia    Atrial fibrillation with slow ventricular response ([address]    Pacemaker pocket hematoma, initial encounter    Pulmonary hypertension ([address]    Presence of permanent cardiac pacemaker    Fall at home, initial encounter    Left displaced femoral neck fracture ([address]    Retained orthopedic hardware    COVID-19    Elevated C-reactive protein (CRP)       MEDICAL DECISION MAKING AND PLAN    Neuro -MMPT -tylenol, gabapentin, robaxin, home requip  CV -BP remains systolic 14-396H, asymptomatic  Heme: Hgb stable on home xarelto. S/p 1 unit prbc, EOD iron supplement. decrease daily cbc to every other day. Pulm -encourage incentive spirometry, cont COPD meds, Satting well without difficulty breathing. Renal -voiding spontaneously. Limited I/O charting. EOD BMP, encourage po intake. GI -puree diet (prior gastric surg), creon, supplement. Bowel regimen. ID -infectious disease on board for Covid. Received Sotrivimab 1/29. Placed on Keflex by ortho on 2/2. Recommend 14 day course due to continued drainage in the left knee after surgery and history of left TKA. Nystatin for oral thrush, 14 day complete 2/16.   Endo- glucose less than BMP:   No results for input(s): NA, K, CL, CO2, BUN, CREATININE, GLUCOSE in the last 72 hours. COAGS: No results for input(s): APTT, PROT, INR in the last 72 hours. RADIOLOGY:  No results found. Electronically signed by Shannon Stiles MD on 2/6/2022 at 6:36 AM     Due to the current efforts to prevent transmission of COVID-19 and also the need to preserve PPE for other caregivers, a face-to-face encounter with the patient was not performed. All relevant records and diagnostic tests were reviewed, including laboratory results and imaging. Physical exam was performed by bedside nursing and confirmed during telemedicine rounds. Multi-organ system plan was formulated by the trauma team and conveyed to bedside nursing staff through telemedicine. Dispo planning.

## 2022-02-07 LAB
HCT VFR BLD CALC: 25 % (ref 40.7–50.3)
HEMOGLOBIN: 8.2 G/DL (ref 13–17)

## 2022-02-07 PROCEDURE — 97110 THERAPEUTIC EXERCISES: CPT

## 2022-02-07 PROCEDURE — 6370000000 HC RX 637 (ALT 250 FOR IP): Performed by: STUDENT IN AN ORGANIZED HEALTH CARE EDUCATION/TRAINING PROGRAM

## 2022-02-07 PROCEDURE — 97530 THERAPEUTIC ACTIVITIES: CPT

## 2022-02-07 PROCEDURE — 85018 HEMOGLOBIN: CPT

## 2022-02-07 PROCEDURE — 6370000000 HC RX 637 (ALT 250 FOR IP): Performed by: HEALTH CARE PROVIDER

## 2022-02-07 PROCEDURE — 2580000003 HC RX 258: Performed by: STUDENT IN AN ORGANIZED HEALTH CARE EDUCATION/TRAINING PROGRAM

## 2022-02-07 PROCEDURE — 94761 N-INVAS EAR/PLS OXIMETRY MLT: CPT

## 2022-02-07 PROCEDURE — 99232 SBSQ HOSP IP/OBS MODERATE 35: CPT | Performed by: NURSE PRACTITIONER

## 2022-02-07 PROCEDURE — 6370000000 HC RX 637 (ALT 250 FOR IP): Performed by: NURSE PRACTITIONER

## 2022-02-07 PROCEDURE — 97535 SELF CARE MNGMENT TRAINING: CPT

## 2022-02-07 PROCEDURE — 1200000000 HC SEMI PRIVATE

## 2022-02-07 PROCEDURE — 97116 GAIT TRAINING THERAPY: CPT

## 2022-02-07 PROCEDURE — 36415 COLL VENOUS BLD VENIPUNCTURE: CPT

## 2022-02-07 PROCEDURE — 85014 HEMATOCRIT: CPT

## 2022-02-07 RX ORDER — MIDODRINE HYDROCHLORIDE 5 MG/1
5 TABLET ORAL DAILY
Status: DISCONTINUED | OUTPATIENT
Start: 2022-02-07 | End: 2022-02-08

## 2022-02-07 RX ORDER — ONDANSETRON 4 MG/1
4 TABLET, ORALLY DISINTEGRATING ORAL DAILY PRN
Status: DISCONTINUED | OUTPATIENT
Start: 2022-02-07 | End: 2022-02-09

## 2022-02-07 RX ADMIN — Medication 500000 UNITS: at 20:52

## 2022-02-07 RX ADMIN — Medication 500000 UNITS: at 08:06

## 2022-02-07 RX ADMIN — RIVAROXABAN 20 MG: 20 TABLET, FILM COATED ORAL at 17:41

## 2022-02-07 RX ADMIN — DOCUSATE SODIUM 100 MG: 100 CAPSULE ORAL at 09:57

## 2022-02-07 RX ADMIN — CEPHALEXIN 500 MG: 500 CAPSULE ORAL at 08:05

## 2022-02-07 RX ADMIN — GABAPENTIN 300 MG: 300 CAPSULE ORAL at 05:40

## 2022-02-07 RX ADMIN — BUMETANIDE 2 MG: 1 TABLET ORAL at 08:05

## 2022-02-07 RX ADMIN — POLYETHYLENE GLYCOL 3350 17 G: 17 POWDER, FOR SOLUTION ORAL at 21:10

## 2022-02-07 RX ADMIN — ONDANSETRON 4 MG: 4 TABLET, ORALLY DISINTEGRATING ORAL at 14:19

## 2022-02-07 RX ADMIN — CEPHALEXIN 500 MG: 500 CAPSULE ORAL at 17:41

## 2022-02-07 RX ADMIN — METHOCARBAMOL TABLETS 750 MG: 750 TABLET, COATED ORAL at 17:41

## 2022-02-07 RX ADMIN — ROPINIROLE HYDROCHLORIDE 0.25 MG: 0.25 TABLET, FILM COATED ORAL at 08:05

## 2022-02-07 RX ADMIN — BUDESONIDE AND FORMOTEROL FUMARATE DIHYDRATE 2 PUFF: 160; 4.5 AEROSOL RESPIRATORY (INHALATION) at 20:51

## 2022-02-07 RX ADMIN — MIDODRINE HYDROCHLORIDE 5 MG: 5 TABLET ORAL at 18:13

## 2022-02-07 RX ADMIN — CEPHALEXIN 500 MG: 500 CAPSULE ORAL at 20:52

## 2022-02-07 RX ADMIN — PANCRELIPASE 24000 UNITS: 24000; 76000; 120000 CAPSULE, DELAYED RELEASE PELLETS ORAL at 12:59

## 2022-02-07 RX ADMIN — PANCRELIPASE 24000 UNITS: 24000; 76000; 120000 CAPSULE, DELAYED RELEASE PELLETS ORAL at 08:07

## 2022-02-07 RX ADMIN — PANCRELIPASE 24000 UNITS: 24000; 76000; 120000 CAPSULE, DELAYED RELEASE PELLETS ORAL at 18:13

## 2022-02-07 RX ADMIN — GABAPENTIN 300 MG: 300 CAPSULE ORAL at 13:00

## 2022-02-07 RX ADMIN — ACETAMINOPHEN 1000 MG: 325 TABLET ORAL at 13:00

## 2022-02-07 RX ADMIN — Medication 500000 UNITS: at 17:41

## 2022-02-07 RX ADMIN — ACETAMINOPHEN 1000 MG: 325 TABLET ORAL at 21:11

## 2022-02-07 RX ADMIN — SODIUM CHLORIDE, PRESERVATIVE FREE 10 ML: 5 INJECTION INTRAVENOUS at 08:06

## 2022-02-07 RX ADMIN — SODIUM CHLORIDE, PRESERVATIVE FREE 10 ML: 5 INJECTION INTRAVENOUS at 20:52

## 2022-02-07 RX ADMIN — METHOCARBAMOL TABLETS 750 MG: 750 TABLET, COATED ORAL at 00:02

## 2022-02-07 RX ADMIN — PANTOPRAZOLE SODIUM 40 MG: 40 TABLET, DELAYED RELEASE ORAL at 05:40

## 2022-02-07 RX ADMIN — BUDESONIDE AND FORMOTEROL FUMARATE DIHYDRATE 2 PUFF: 160; 4.5 AEROSOL RESPIRATORY (INHALATION) at 08:07

## 2022-02-07 RX ADMIN — METOPROLOL TARTRATE 25 MG: 25 TABLET ORAL at 08:05

## 2022-02-07 RX ADMIN — TAMSULOSIN HYDROCHLORIDE 0.4 MG: 0.4 CAPSULE ORAL at 08:06

## 2022-02-07 RX ADMIN — ACETAMINOPHEN 1000 MG: 325 TABLET ORAL at 05:40

## 2022-02-07 RX ADMIN — GABAPENTIN 300 MG: 300 CAPSULE ORAL at 21:59

## 2022-02-07 RX ADMIN — SENNOSIDES 8.6 MG: 8.6 TABLET, COATED ORAL at 20:52

## 2022-02-07 RX ADMIN — ROPINIROLE HYDROCHLORIDE 0.25 MG: 0.25 TABLET, FILM COATED ORAL at 20:52

## 2022-02-07 RX ADMIN — CEPHALEXIN 500 MG: 500 CAPSULE ORAL at 12:59

## 2022-02-07 RX ADMIN — METHOCARBAMOL TABLETS 750 MG: 750 TABLET, COATED ORAL at 23:15

## 2022-02-07 RX ADMIN — METHOCARBAMOL TABLETS 750 MG: 750 TABLET, COATED ORAL at 12:59

## 2022-02-07 RX ADMIN — METHOCARBAMOL TABLETS 750 MG: 750 TABLET, COATED ORAL at 05:40

## 2022-02-07 ASSESSMENT — PAIN SCALES - GENERAL
PAINLEVEL_OUTOF10: 8
PAINLEVEL_OUTOF10: 8
PAINLEVEL_OUTOF10: 6
PAINLEVEL_OUTOF10: 7
PAINLEVEL_OUTOF10: 7
PAINLEVEL_OUTOF10: 6
PAINLEVEL_OUTOF10: 7
PAINLEVEL_OUTOF10: 5

## 2022-02-07 ASSESSMENT — PAIN DESCRIPTION - LOCATION
LOCATION: KNEE;LEG
LOCATION: KNEE
LOCATION: SHOULDER
LOCATION: LEG

## 2022-02-07 ASSESSMENT — ENCOUNTER SYMPTOMS
COLOR CHANGE: 0
DIARRHEA: 0
ABDOMINAL DISTENTION: 0
NAUSEA: 0
COUGH: 0
VOMITING: 0
EYE DISCHARGE: 0
APNEA: 0
SHORTNESS OF BREATH: 0

## 2022-02-07 ASSESSMENT — PAIN DESCRIPTION - ORIENTATION
ORIENTATION: LEFT

## 2022-02-07 ASSESSMENT — PAIN DESCRIPTION - PAIN TYPE
TYPE: ACUTE PAIN
TYPE: ACUTE PAIN
TYPE: CHRONIC PAIN

## 2022-02-07 ASSESSMENT — PAIN DESCRIPTION - DESCRIPTORS: DESCRIPTORS: ACHING

## 2022-02-07 ASSESSMENT — PAIN - FUNCTIONAL ASSESSMENT: PAIN_FUNCTIONAL_ASSESSMENT: PREVENTS OR INTERFERES SOME ACTIVE ACTIVITIES AND ADLS

## 2022-02-07 NOTE — PLAN OF CARE
Problem: Airway Clearance - Ineffective  Goal: Achieve or maintain patent airway  2/6/2022 2239 by Rachel Yadav RN  Outcome: Ongoing  2/6/2022 1138 by Abril Bishop RN  Outcome: Ongoing     Problem: Gas Exchange - Impaired  Goal: Absence of hypoxia  2/6/2022 2239 by Rachel Yadav RN  Outcome: Ongoing  2/6/2022 1138 by Abril Bishop RN  Outcome: Ongoing  Goal: Promote optimal lung function  2/6/2022 2239 by Rachel Yadav RN  Outcome: Ongoing  2/6/2022 1138 by Abril Bishop RN  Outcome: Ongoing     Problem: Breathing Pattern - Ineffective  Goal: Ability to achieve and maintain a regular respiratory rate  2/6/2022 2239 by Rachel Yadav RN  Outcome: Ongoing  2/6/2022 1138 by Abril Bishop RN  Outcome: Ongoing     Problem:  Body Temperature -  Risk of, Imbalanced  Goal: Ability to maintain a body temperature within defined limits  2/6/2022 2239 by Rachel Yadav RN  Outcome: Ongoing  2/6/2022 1138 by Abril Bishop RN  Outcome: Ongoing  Goal: Will regain or maintain usual level of consciousness  2/6/2022 2239 by Rachel Yadav RN  Outcome: Ongoing  2/6/2022 1138 by Abril Bishop RN  Outcome: Ongoing  Goal: Complications related to the disease process, condition or treatment will be avoided or minimized  2/6/2022 2239 by Rachel Yadav RN  Outcome: Ongoing  2/6/2022 1138 by Abril Bishop RN  Outcome: Ongoing     Problem: Isolation Precautions - Risk of Spread of Infection  Goal: Prevent transmission of infection  2/6/2022 2239 by Rachel Yadav RN  Outcome: Ongoing  2/6/2022 1138 by Abril Bishop RN  Outcome: Ongoing     Problem: Nutrition Deficits  Goal: Optimize nutritional status  2/6/2022 2239 by Rachel Yadav RN  Outcome: Ongoing  2/6/2022 1138 by Abril Bishop RN  Outcome: Ongoing     Problem: Risk for Fluid Volume Deficit  Goal: Maintain normal heart rhythm  2/6/2022 2239 by Rachel Yadav RN  Outcome: Ongoing  2/6/2022 1138 by Abril Bishop RN  Outcome: Ongoing  Goal: Maintain absence of muscle cramping  2/6/2022 2239 by Israel Handy RN  Outcome: Ongoing  2/6/2022 1138 by Rima Alford RN  Outcome: Ongoing  Goal: Maintain normal serum potassium, sodium, calcium, phosphorus, and pH  2/6/2022 2239 by Israel Handy RN  Outcome: Ongoing  2/6/2022 1138 by Rima Alford RN  Outcome: Ongoing     Problem: Loneliness or Risk for Loneliness  Goal: Demonstrate positive use of time alone when socialization is not possible  2/6/2022 2239 by Israel Handy RN  Outcome: Ongoing  2/6/2022 1138 by Rima Alford RN  Outcome: Ongoing     Problem: Fatigue  Goal: Verbalize increase energy and improved vitality  2/6/2022 2239 by Israel Handy RN  Outcome: Ongoing  2/6/2022 1138 by Rima Alford RN  Outcome: Ongoing     Problem: Patient Education: Go to Patient Education Activity  Goal: Patient/Family Education  2/6/2022 2239 by Israel Handy RN  Outcome: Ongoing  2/6/2022 1138 by Rima Alford RN  Outcome: Ongoing     Problem: Pain:  Goal: Pain level will decrease  Description: Pain level will decrease  2/6/2022 2239 by Israel Handy RN  Outcome: Ongoing  2/6/2022 1138 by Rima Alford RN  Outcome: Ongoing  Goal: Control of acute pain  Description: Control of acute pain  2/6/2022 2239 by Israel Handy RN  Outcome: Ongoing  2/6/2022 1138 by Rima Alford RN  Outcome: Ongoing  Goal: Control of chronic pain  Description: Control of chronic pain  2/6/2022 2239 by Israel Handy RN  Outcome: Ongoing  2/6/2022 1138 by Rima Alford RN  Outcome: Ongoing     Problem: Skin Integrity:  Goal: Will show no infection signs and symptoms  Description: Will show no infection signs and symptoms  2/6/2022 2239 by Israel Handy RN  Outcome: Ongoing  2/6/2022 1138 by Rima Alford RN  Outcome: Ongoing  Goal: Absence of new skin breakdown  Description: Absence of new skin breakdown  2/6/2022 2239 by Israel Handy RN  Outcome: Ongoing  2/6/2022 1138 by Rima Duvall, RN  Outcome: Ongoing     Problem: Falls - Risk of:  Goal: Will remain free from falls  Description: Will remain free from falls  2/6/2022 2239 by Elvis Mojica RN  Outcome: Ongoing  2/6/2022 1138 by Tate Marie RN  Outcome: Ongoing  Goal: Absence of physical injury  Description: Absence of physical injury  2/6/2022 2239 by Elvis Mojica RN  Outcome: Ongoing  2/6/2022 1138 by Tate Marie RN  Outcome: Ongoing     Problem: Nutrition  Goal: Optimal nutrition therapy  2/6/2022 2239 by Elvis Mojica RN  Outcome: Ongoing  2/6/2022 1138 by Tate Marie RN  Outcome: Ongoing           Start of shift, pain reported 8/10. Lidocaine patch, robaxin, 1g tylenol, and gabapentin administration: pain now 5-6/10. Pt voids via urinal (keeps between his legs). Order in system to ace wrap LLE from calf to knee to reduce swelling; performed. Wound vac in place w/o any drainage. Turning q2hr w/ pillow support as patient does not like wedges. Patient remains on room air.

## 2022-02-07 NOTE — PROGRESS NOTES
Physical Therapy  Facility/Department: [facility] 2  Daily Treatment Note  NAME: Kathy Howard  : 1945  MRN: [id number]    Date of Service: 2022    Discharge Recommendations:  Patient would benefit from continued therapy after discharge   PT Equipment Recommendations  Equipment Needed: Yes  Mobility Devices: Laurance Sequeira: Rolling    Assessment   Body structures, Functions, Activity limitations: Decreased functional mobility ; Decreased posture;Decreased endurance;Decreased ROM; Decreased strength;Decreased balance; Increased pain  Assessment: Pt with mobility deficits requiring mod-A to perform sit<>stand transfer, max-A to ambulate 6 feet with a RW. Pt requires max verbal cues for sequencing with ambulation, remains unable to advance LLE without physical assistance. Pt would be unsafe to return to prior living arrangements upon discharge. Pt would benefit from additional intense PT to address deficits. Prognosis: Good  PT Education: Goals;PT Role;General Safety;Weight-bearing Education;Plan of Care;Gait Training;Functional Mobility Training;Transfer Training  REQUIRES PT FOLLOW UP: Yes  Activity Tolerance  Activity Tolerance: Patient limited by endurance; Patient limited by pain;Treatment limited secondary to medical complications (free text)  Activity Tolerance: limited secondary to increased nausea. Patient Diagnosis(es): The encounter diagnosis was Left displaced femoral neck fracture ([facility] [address] [address]      has a past medical history of Acute superficial gastritis without hemorrhage, Anastomotic stricture of stomach, Asthma, Atrial fibrillation ([address] Calculus of gallbladder without cholecystitis without obstruction, Chronic diastolic heart failure (HCC), Chronic rhinosinusitis, Class 2 severe obesity due to excess calories with serious comorbidity and body mass index (BMI) of 36.0 to 36.9 in adult [facility]), COPD (chronic obstructive pulmonary disease) ([facility] [address] E. coli septicemia ([facility] [address] E. coli UTI, Foot drop, right, Fracture of femur ([address] Gait disorder, Gastric out let obstruction, GERD (gastroesophageal reflux disease), Hallux valgus, acquired, bilateral, Hyperlipidemia, Hypertension, Impaired hearing, Internal hemorrhoids, Lymphedema of both lower extremities, Nausea & vomiting, OA (osteoarthritis) of knee, bilateral, Obesity, MARIAMA on CPAP, Osteoarthritis, Osteoarthritis of lumbar spine, S/P revision of total knee, Septicemia due to E. coli ([address] Sick sinus syndrome ([address] Simple chronic bronchitis ([address] Slow transit constipation, Unspecified sleep apnea, and UTI (urinary tract infection). has a past surgical history that includes Finger amputation (1966); Gastric bypass surgery (1985); Carpal tunnel release; Colonoscopy; Rotator cuff repair; joint replacement (2004 & 2005); Hemorrhoid surgery; pr egd transoral biopsy single/multiple (N/A, 05/25/2018); Upper gastrointestinal endoscopy (08/13/2018); Upper gastrointestinal endoscopy (N/A, 08/13/2018); Upper gastrointestinal endoscopy (N/A, 08/13/2018); Upper gastrointestinal endoscopy (08/16/2018); pr egd flexible foreign body removal (N/A, 08/16/2018); Upper gastrointestinal endoscopy (08/16/2018); Upper gastrointestinal endoscopy (N/A, 10/01/2018); Upper gastrointestinal endoscopy (10/01/2018); Upper gastrointestinal endoscopy (N/A, 11/19/2018); Cystoscopy (01/02/2019); Cystoscopy (N/A, 01/02/2019); Upper gastrointestinal endoscopy (N/A, 10/15/2020); Colonoscopy (N/A, 04/05/2021); Pacemaker insertion (04/09/2021); Upper gastrointestinal endoscopy (N/A, 7/16/2021); hip surgery (Left, 01/30/2022); knee surgery (Left, 01/30/2022); hip surgery (Left, 01/30/2022); Revision Total Knee Arthroplasty (Left, 01/30/2022); Femur fracture surgery (Left, 1/30/2022); and Revision total knee arthroplasty (Left, 1/30/2022).     Restrictions  Restrictions/Precautions  Restrictions/Precautions: Weight Bearing,Isolation,Fall Risk  Required Braces or Orthoses?: Yes  Lower Extremity Weight Bearing Restrictions  Left Lower Extremity Weight Bearing: Weight Bearing As Tolerated  Required Braces or Orthoses  Right Lower Extremity Brace: Ankle Foot Orthotics  Left Lower Extremity Brace: Erika Foot Orthotics  Position Activity Restriction  Other position/activity restrictions: 1/30 L IT fx s/p HWR w/ CMN-fixation; wears BLE AFOs at baseline d/t dropfoot; wound vac to L knee, watch BP and HR  Subjective   General  Response To Previous Treatment: Patient with no complaints from previous session. Family / Caregiver Present: No  Subjective  Subjective: RN and Pt  agreeable to therapy. Pt supine in bed upon arrival. Pt pleasant and cooperative throughout today's session. Pain Screening  Patient Currently in Pain: Yes  Pain Assessment  Pain Assessment: 0-10  Pain Level: 6  Pain Type: Acute pain  Pain Location: Leg  Pain Orientation: Left  Pain Descriptors: Aching  Functional Pain Assessment: Prevents or interferes some active activities and ADLs  Non-Pharmaceutical Pain Intervention(s): Ambulation/Increased Activity; Distraction;Repositioned; Emotional support  Response to Pain Intervention: Patient Satisfied  Vital Signs  Patient Currently in Pain: Yes       Cognition   Cognition  Overall Cognitive Status: WFL  Objective   Bed mobility  Rolling to Left: Minimal assistance  Rolling to Right: Minimal assistance  Supine to Sit: Moderate assistance  Sit to Supine:  (pt retired up to a chair at the conclusion of today's session.)  Scooting: Minimal assistance  Comment: Multiple rolling bouts performed to allow the pt to perform self-care. Bed mobility performed with the [facility] elevated ~30 degrees with use of handrails. Pt's sitting balance was challenged in unsupported sitting x 10 minutes this date. Transfers  Sit to Stand: Moderate Assistance  Stand to sit: Moderate Assistance  Comment: Increased time/effort to perform.   Ambulation  Ambulation?: Yes  WB Status: WBAT LLE  More Ambulation?: No  Ambulation & Training,Home Exercise Program,Equipment Evaluation, Education, & procurement,Patient/Caregiver Education & Training  Safety Devices  Type of devices: Nurse notified,All fall risk precautions in place,Gait belt,Call light within reach,Left in chair  Restraints  Initially in place: No     Therapy Time   Individual Concurrent Group Co-treatment   Time In 1322         Time Out 1424         Minutes 62         Timed Code Treatment Minutes: Quentin Tucker, PT

## 2022-02-07 NOTE — PROGRESS NOTES
Occupational Therapy  Facility/Department: [facility] CAR 2  Daily Treatment Note  NAME: Dev Robbins  : 1945  MRN: [id number]    Date of Service: 2022    Discharge Recommendations:  Patient would benefit from continued therapy after discharge  OT Equipment Recommendations  Equipment Needed: Yes  Walker: Rolling  ADL Assistive Devices: Long-handled Sponge;Long-handled Shoe Horn;Sock-Aid Hard;Transfer Tub Bench  Copied from Emergency Medicine:  58-year-old male presenting with left hip pain. Patient was reaching for his walker at home, was unable to grab it, and fell onto his left hip. He was unable to ambulate after this. He is on Xarelto for A. fib. He denies any loss of consciousness, but did strike his head. No chest pain, back pain, or arm pain. X-ray reveals peritrochanteric fracture of the left femur  Will consult trauma and orthopedics for evaluation and admission  Assessment    Pt lying supine in bed upon entrance to room. Pt completed bed mobility with mod assistance. Pt sat at eob 40 minutes with good unsupported sitting balance with forward flkexed posture and use of BUE's intermittently for support. Please refer to below assist levels for adl completion. Pt ed on OT POC, safety awareness tech, proper hand placement for transfers, and energy conservation tech with proper breathing tech with good return. Pt also educated on proper use of incentive spirometer use. Pt completed 1 set of 10 reps reaching 750 ml. Pt encouraged to complete 10 reps every hour with pt in agreement. Pt retired supine in bed with call light and phone in reach, be alarm activated, RN aware. . All needs met upon exit. Performance deficits / Impairments: Decreased functional mobility ; Decreased ADL status; Decreased strength;Decreased ROM; Decreased endurance;Decreased balance;Decreased high-level IADLs  Treatment Diagnosis: L Displaced Femoral Neck Fracture S/P Fall  Prognosis: Good  OT Education: OT Role;Plan of Care  Patient Education: Pt educated on OT role, OT POC, activity promotion, WB status, transfer training, safety awareness, proper use of incnetive spiromater- G return  REQUIRES OT FOLLOW UP: Yes  Safety Devices  Safety Devices in place: Yes  Type of devices: Call light within reach;Nurse notified; Left in bed;Bed alarm in place  Restraints  Initially in place: No       Patient Diagnosis(es): The encounter diagnosis was Left displaced femoral neck fracture ([facility] [address] has a past medical history of Acute superficial gastritis without hemorrhage, Anastomotic stricture of stomach, Asthma, Atrial fibrillation ([address] Calculus of gallbladder without cholecystitis without obstruction, Chronic diastolic heart failure (HCC), Chronic rhinosinusitis, Class 2 severe obesity due to excess calories with serious comorbidity and body mass index (BMI) of 36.0 to 36.9 in [facility]), COPD (chronic obstructive pulmonary disease) ([facility] [address] E. coli septicemia ([facility] [address] E. coli UTI, Foot drop, right, Fracture of femur ([address] Gait disorder, Gastric out let obstruction, GERD (gastroesophageal reflux disease), Hallux valgus, acquired, bilateral, Hyperlipidemia, Hypertension, Impaired hearing, Internal hemorrhoids, Lymphedema of both lower extremities, Nausea & vomiting, OA (osteoarthritis) of knee, bilateral, Obesity, MARIAMA on CPAP, Osteoarthritis, Osteoarthritis of lumbar spine, S/P revision of total knee, Septicemia due to E. coli ([address] Sick sinus syndrome ([address] Simple chronic bronchitis ([address] Slow transit constipation, Unspecified sleep apnea, and UTI (urinary tract infection). has a past surgical history that includes Finger amputation (1966); Gastric bypass surgery (1985); Carpal tunnel release; Colonoscopy; Rotator cuff repair; joint replacement (2004 & 2005); Hemorrhoid surgery; pr egd transoral biopsy single/multiple (N/A, 05/25/2018); Upper gastrointestinal endoscopy (08/13/2018);  Upper gastrointestinal endoscopy (N/A, 08/13/2018); Upper gastrointestinal endoscopy (N/A, 08/13/2018); Upper gastrointestinal endoscopy (08/16/2018); pr egd flexible foreign body removal (N/A, 08/16/2018); Upper gastrointestinal endoscopy (08/16/2018); Upper gastrointestinal endoscopy (N/A, 10/01/2018); Upper gastrointestinal endoscopy (10/01/2018); Upper gastrointestinal endoscopy (N/A, 11/19/2018); Cystoscopy (01/02/2019); Cystoscopy (N/A, 01/02/2019); Upper gastrointestinal endoscopy (N/A, 10/15/2020); Colonoscopy (N/A, 04/05/2021); Pacemaker insertion (04/09/2021); Upper gastrointestinal endoscopy (N/A, 7/16/2021); hip surgery (Left, 01/30/2022); knee surgery (Left, 01/30/2022); hip surgery (Left, 01/30/2022); Revision Total Knee Arthroplasty (Left, 01/30/2022); Femur fracture surgery (Left, 1/30/2022); and Revision total knee arthroplasty (Left, 1/30/2022). Restrictions  Restrictions/Precautions  Restrictions/Precautions: Weight Bearing,Isolation,Fall Risk  Required Braces or Orthoses?: Yes  Lower Extremity Weight Bearing Restrictions  Left Lower Extremity Weight Bearing: Weight Bearing As Tolerated  Required Braces or Orthoses  Right Lower Extremity Brace: Ankle Foot Orthotics  Left Lower Extremity Brace: Erika Foot Orthotics  Position Activity Restriction  Other position/activity restrictions: 1/30 L IT fx s/p HWR w/ CMN-fixation; wears BLE AFOs at baseline d/t dropfoot; wound vac to L knee, watch BP and HR  Subjective   General  Patient assessed for rehabilitation services?: Yes  Family / Caregiver Present: No  General Comment  Comments: RN ok'd for OT tx this AM. Pt agreeable to session, pleasent/cooperative throughout. Pain Assessment  Pain Assessment: 0-10  Pain Level: 6  Pain Type: Chronic pain  Pain Location: Shoulder  Pain Orientation: Left  Non-Pharmaceutical Pain Intervention(s): Repositioned; Ambulation/Increased Activity; Therapeutic presence;Distraction  Vital Signs  Patient Currently in Pain: Yes  Oxygen Therapy  O2 Device: None (Room air) Orientation  Orientation  Overall Orientation Status: Within Functional Limits  Objective    ADL  Feeding: Minimal assistance; Increased time to complete;Setup (Pt able to cut pancakes with fork, open syrup and drizzle over pancakes. Increased difficulty opening oj and nutrition supplement. Pt I with hand to mouth patterning)  Grooming: Setup; Increased time to complete;Supervision (with oral care including doffing and donning B dentures, brushing dentures and using mouth wash, brushing hair)  UE Bathing: Setup;Verbal cueing; Increased time to complete;Minimal assistance (assist for back)  LE Bathing: Maximum assistance;Setup (washing B feet and applying lotion)  LE Dressing:  (for doffing personal socks and donning B hosp socks)  Additional Comments: Grooming sitting EOB (wash face/oral care) CGA to ensure stability, increased time and effort. LB dress- Severiano/doff B hosp socks sitting EOB, max A d/t weakness and decreased functional reach     Balance  Sitting Balance: Contact guard assistance (CGA sitting EOB while engaged in grooming activities ~40 min)  Bed mobility  Rolling to Right: Minimal assistance  Supine to Sit: Moderate assistance (for trunk and BLE progression)  Sit to Supine: Moderate assistance (for trunk and BLE progression)  Scooting: Moderate assistance  Comment: HOB elevated.  mPt reprots feeling very sleepy and tired this am from not sleeping well last pm. Pt reports was staring at the clock all night      Cognition  Overall Cognitive Status: [address]  Times per week: 4-5x/wk  Current Treatment Recommendations: Safety Education & Training,Self-Care / ADL,Patient/Caregiver Education & Training,Balance Training,Functional Mobility Training,Equipment Evaluation, Education, & procurement,Home Management Training,Endurance Training    AM-PAC Score  AM-PAC Inpatient Daily Activity Raw Score: 16 (02/07/22 1013)  AM-PAC Inpatient ADL T-Scale Score : 35.96 (02/07/22 1013)  ADL Inpatient CMS 0-100% Score: 53.32 (02/07/22 1013)  ADL Inpatient CMS G-Code Modifier : CK (02/07/22 1013)    Goals  Short term goals  Time Frame for Short term goals: By discharge, pt will:  Short term goal 1: Demo bed mobility sit<>supine with Min A  Short term goal 2: Demo functional sit<>stand transfers with Min A and LRD PRN  Short term goal 3: Demo functional mobility with Mod A and LRD PRN  Short term goal 4: Demo +5 minutes of dynamic standing balance with Min A to promote increased engagement in ADLs  Short term goal 5: Demo UB ADLs with SUP and setup  Short term goal 6: Demo LB ADLs with Min A, setup and use of DME PRN       Therapy Time   Individual Concurrent Group Co-treatment   Time In 0846         Time Out 0951         Minutes 65         Timed Code Treatment Minutes: [address], OTR/L

## 2022-02-07 NOTE — PROGRESS NOTES
PROGRESS NOTE    PATIENT NAME: Omid Steele RECORD NO. [id number]  DATE: 2/7/2022  SURGEON: Celestine Cabral  PRIMARY CARE PHYSICIAN: JULIAN Caraballo - CNP    HD: # 9    ASSESSMENT  Patient Active Problem List   Diagnosis    Chronic atrial fibrillation ([address]    Dyslipidemia    Gastroesophageal reflux disease without esophagitis    Osteoarthritis of lumbar spine    OA (osteoarthritis) of knee, bilateral    Foot drop, right    Hallux valgus, acquired, bilateral    Hearing impairment    Essential hypertension    Slow transit constipation    MARIAMA on CPAP    COPD (chronic obstructive pulmonary disease) (HCC)    Chronic diastolic heart failure (HCC)    History of bariatric surgery including banding leading to esophageal stricture and aspiration    BPH (benign prostatic hyperplasia)    Myalgia    Atrial fibrillation with slow ventricular response ([address]    Pacemaker pocket hematoma, initial encounter    Pulmonary hypertension ([address]    Presence of permanent cardiac pacemaker    Fall at home, initial encounter    Left displaced femoral neck fracture ([address]    Retained orthopedic hardware    COVID-19    Elevated C-reactive protein (CRP)     New diagnoses: NONE    PLAN  1. oRTHO WANTS TO CONTINUE pROVENA TO SURGICAL INCISION SITE. SUBJECTIVE  Patient is doing well. Pain is controlled. he is tolerating a ADULT ORAL NUTRITION SUPPLEMENT; Breakfast, Lunch, Dinner; Standard High Calorie/High Protein Oral Supplement  ADULT DIET; Easy to [employer]. Patient is tolerating up with assistance. Patient is passing flatus and has had a bowel movement. Patient denies nausea or vomiting.      OBJECTIVE  VITALS   Patient Vitals for the past 24 hrs:   BP Temp Temp src Pulse Resp SpO2   02/07/22 1200 113/68 -- -- 75 18 99 %   02/07/22 0922 -- -- -- 71 16 99 %   02/07/22 0800 (!) 115/90 97.3 °F (36.3 °C) Axillary 70 17 98 %   02/07/22 0430 (!) 112/59 98.7 °F (37.1 °C) Oral 70 16 97 %   02/07/22 0000 104/62 98.9 °F (37.2 °C) Oral 70 16 97 %   02/06/22 2051 105/66 98.8 °F (37.1 °C) Oral 73 14 100 %     GENERAL: alert  NEUROLOGIC: alert, oriented, normal speech, no focal findings or movement disorder noted  LUNGS: clear to auscultation bilaterally- no wheezes, rales or rhonchi, normal air movement, no respiratory distress  HEART: normal rate  ABDOMEN: soft, non-tender, non-distended, normal bowel sounds, no masses or organomegaly  WOUNDS: healing well  EXTREMITY: no cyanosis and no clubbing    24 HR INTAKE/OUTPUT:     Intake/Output Summary (Last 24 hours) at 2/7/2022 1218  Last data filed at 2/7/2022 0456  Gross per 24 hour   Intake 300 ml   Output 1150 ml   Net -850 ml       Chest X-Ray: See radiology report    LABS:  CBC:   Recent Labs     02/05/22  0648 02/06/22  0656   WBC 5.5 5.8   HGB 7.5* 7.2*   HCT 23.3* 22.4*   MCV 98.3 97.4    218     BMP: [email or web address]  COAGS: No results for input(s): APTT, PROT, INR in the last 72 hours. PANCREAS:  No results for input(s): LIPASE, AMYLASE in the last 72 hours. LIVER: No results for input(s): AST, ALT, BILIDIR, BILITOT, ALKPHOS in the last 72 hours.   CBC:   Lab Results   Component Value Date    WBC 5.8 02/06/2022    RBC 2.30 02/06/2022    RBC 4.39 09/13/2011    HGB 7.2 02/06/2022    HCT 22.4 02/06/2022    MCV 97.4 02/06/2022    MCH 31.3 02/06/2022    MCHC 32.1 02/06/2022    RDW 15.3 02/06/2022     02/06/2022     09/13/2011    MPV 9.6 02/06/2022     BMP:    Lab Results   Component Value Date     02/03/2022    K 4.6 02/03/2022    CL 98 02/03/2022    CO2 22 02/03/2022    BUN 17 02/03/2022    LABALBU 4.3 01/07/2022    CREATININE 0.65 02/03/2022    CALCIUM 8.3 02/03/2022    GFRAA >60 02/03/2022    LABGLOM >60 02/03/2022    GLUCOSE 96 02/03/2022    GLUCOSE 99 09/13/2011       Viviana Rodriguez MD  2/7/22, 12:18 PM

## 2022-02-07 NOTE — PROGRESS NOTES
[facility] -   Infectious diseases evaluation  admission date 1/28/2022    reason for consultation:   covid    Impression :   Current:  · Left hip intertrochanteric fracture after a fall  ·      1/30  S/p Removal of deep orthopedic hardware left femur,                                Cephalomedullary  nail fixation left intertrochanteric femur fracture. · Covid positive, no hypoxemia  · Elevated CRP  · Right lower lobe atelectatic lesions, less likely Covid related  · Underlying COPD  Other:  ·   Discussion / summary of stay / plan of care   ·   Recommendations   · Low  CRP and ferritin  · sotrivimab for asymptomatic Covid in a high risk patient. Received 1/29. · 2 L NC for copd  · 2/1 O2 at 2 L NC which is baseline. Doing well. Okay to discharge from ID standpoint. · 2/4 Prophylactic Keflex x 14 days until 2/15/22 per Ortho. Isolate until 2/7/22  Infection Control Recommendations   · [place] Precautions  · Contact Isolation   · Airborne isolation  · Droplet Isolation      Antimicrobial Stewardship Recommendations   · Off ab    Coordination ofOutpatient Care:   · Estimated Length of IV antimicrobials:  · Patient will need Midline / picc Catheter Insertion:   · Patient will need SNF:  · Patient will need outpatient wound care:     History of Present Illness:   Initial history:  Bryson Cosme is a 77y.o.-year-old male fell and broke his hip, left intertrochanteric, Ortho on board planning surgical repair. , found to have Covid positive, underlying COPD  Infectious consulted due to Covid positive    Patient on no oxygen  CT scan of the chest reviewed personally, right lower lobe small atelectasis areas, but cannot rule out Covid related-those lesions seem to be isolated in that area, possibly suggestive of atelectasis    Also has A. fib, and has a pacemaker, EF 50 and no valvular disease      1/31   Afebrile. SpO2 100% on 4 L NC.   Transfused with 1 unit PRBCs. No SOB. Resting. Cre increasing, 1.33    2/1  Afebrile. SpO2 100% on 2 L NC. Cre improving. 2/2  Afebrile. SpO2 99% on 3 L NC  Slightly SOB with activity. Pain controlled. Cre improved. 2/3  Low-grade fever 99. SpO2 96% on RA.    2/4  Afebrile. SpO2 97% on RA. WBC wnl.    2/7  Afebrile. SpO2 98% on RA. Pain controlled. Remains stable from ID standpoint. Working towards discharge. Interval changes  2/7/2022   Patient Vitals for the past 8 hrs:   BP Temp Temp src Pulse Resp SpO2   02/07/22 0800 (!) 115/90 97.3 °F (36.3 °C) Axillary 70 17 98 %   02/07/22 0430 (!) 112/59 98.7 °F (37.1 °C) Oral 70 16 97 %       Summary of relevant labs:  Labs:  W 7-10.7-7.6-5.9-5.7-5.2  Creat  0.8-1.33-1.12-0.80-0.65  CRP 15  Ferritin  274    Micro:  covid +    Imaging:  Chest x-ray negative    CT abdomen pelvis and chest  Right lower lobe small atelectasis, cannot rule out early COVID pneumonia        I have personally reviewed the past medical history, past surgical history, medications, social history, and family history, and I haveupdated the database accordingly. Allergies:   Darvocet [propoxyphene n-acetaminophen], Other, Oxycodone-acetaminophen, and Propoxyphene     Review of Systems:     Review of Systems   Constitutional: Positive for activity change. HENT: Negative for congestion. Eyes: Negative for discharge. Respiratory: Negative for apnea, cough and shortness of breath. Slightly SOB with activity. Cardiovascular: Negative for chest pain. Gastrointestinal: Negative for abdominal distention, diarrhea, nausea and vomiting. Endocrine: Negative for polydipsia. Genitourinary: Negative for dysuria. Musculoskeletal: Positive for arthralgias. Skin: Negative for color change. Allergic/Immunologic: Negative for immunocompromised state. Neurological: Negative for dizziness. Hematological: Negative for adenopathy. Psychiatric/Behavioral: Negative for agitation. Physical Examination :       Physical Exam  Vitals and nursing note reviewed. Constitutional:       Appearance: Normal appearance. He is not ill-appearing. HENT:      Head: Normocephalic and atraumatic. Right Ear: External ear normal.      Left Ear: External ear normal.      Nose: Nose normal.      Mouth/Throat:      Mouth: Mucous membranes are moist.      Pharynx: Oropharynx is clear. Eyes:      General: No scleral icterus. Extraocular Movements: Extraocular movements intact. Conjunctiva/sclera: Conjunctivae normal.      Pupils: Pupils are equal, round, and reactive to light. Cardiovascular:      Rate and Rhythm: Normal rate and regular rhythm. Heart sounds: Normal heart sounds. No murmur heard. No friction rub. Pulmonary:      Effort: Pulmonary effort is normal. No respiratory distress. Breath sounds: No stridor. No rhonchi. Comments: Diminished t/o. Abdominal:      General: Bowel sounds are normal. There is no distension. Palpations: Abdomen is soft. There is no mass. Tenderness: There is no abdominal tenderness. Hernia: No hernia is present. Genitourinary:     Comments: No concepcion. Musculoskeletal:         General: No swelling or deformity. Cervical back: Normal range of motion and neck supple. No rigidity. Skin:     General: Skin is warm and dry. Capillary Refill: Capillary refill takes less than 2 seconds. Coloration: Skin is not jaundiced. Neurological:      Mental Status: He is alert and oriented to person, place, and time. Psychiatric:         Mood and Affect: Mood normal.         Behavior: Behavior normal.         Thought Content:  Thought content normal.         Past Medical History:     Past Medical History:   Diagnosis Date    Acute superficial gastritis without hemorrhage 05/26/2018    Anastomotic stricture of stomach     Asthma     Atrial fibrillation ([facility]     On Xarelto 6/27/2020    Calculus of gallbladder without cholecystitis without obstruction 05/02/2017    Chronic diastolic heart failure ([facility] 11/17/2017    Chronic rhinosinusitis 04/13/2015    Class 2 severe obesity due to excess calories with serious comorbidity and body mass index (BMI) of 36.0 to 36.9 in adult [facility]) 11/17/2017    COPD (chronic obstructive pulmonary disease) ([facility]     E. coli septicemia (HCC)     E. coli UTI     Foot drop, right 07/10/2012    Fracture of femur ([facility] 10/23/2016    Gait disorder 07/20/2016    Gastric out let obstruction     GERD (gastroesophageal reflux disease)     Hallux valgus, acquired, bilateral 06/24/2015    Hyperlipidemia     Hypertension     Impaired hearing 04/13/2015    Internal hemorrhoids 01/03/2017    Lymphedema of both lower extremities 06/24/2015    Nausea & vomiting 11/16/2018    OA (osteoarthritis) of knee, bilateral 12/11/2006    Obesity     MARIAMA on CPAP 05/02/2017    Osteoarthritis     Osteoarthritis of lumbar spine 12/13/2007     replace inactive diagnosis    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 coli (HCC)     Due to UTI     Sick sinus syndrome (HCC)     Simple chronic bronchitis ([facility] 04/10/2018    Slow transit constipation 11/03/2016    Unspecified sleep apnea     UTI (urinary tract infection)        Past Surgical  History:     Past Surgical History:   Procedure Laterality Date    CARPAL TUNNEL RELEASE      bilateral    COLONOSCOPY      COLONOSCOPY N/A 04/05/2021    COLONOSCOPY DIAGNOSTIC performed by Scherrie Nyhan, MD at [address] [place] [address]  01/02/2019    by Dr. Gita Roger N/A 01/02/2019    CYSTOSCOPY performed by Sayra Clifford MD at [address] Left 1/30/2022    LEFT CEPHALOMEDULLARY NAIL INSERTION performed by Jacqueline Zuñiga DO at [facility]    first knuckle right index finger    GASTRIC BYPASS SURGERY  1985    vertical banded gastroplasty    HEMORRHOID SURGERY      HIP SURGERY Left 01/30/2022     LEFT CEPHALOMEDULLARY NAIL INSERTION    HIP SURGERY Left 01/30/2022    LEFT CEPHALOMEDULLARY NAIL INSERTION (Left Hip)     JOINT REPLACEMENT  2004 & 2005    bilateral knees    KNEE SURGERY Left 01/30/2022    KNEE TOTAL ARTHROPLASTY REVISION, REMOVAL OF INTRAMEDULLARY NAIL     PACEMAKER INSERTION  04/09/2021    [place], placed by Dr. Yaima Yip EGD [address] [facility] N/A 08/16/2018    EGD FOREIGN BODY REMOVAL performed by Derrell Ba MD at [address] [facility] EGD TRANSORAL BIOPSY SINGLE/MULTIPLE N/A 05/25/2018    EGD BIOPSY performed by Rowena Montero DO at [address] Left 01/30/2022    KNEE TOTAL ARTHROPLASTY REVISION, REMOVAL OF INTRAMEDULLARY NAIL (Left     REVISION TOTAL KNEE ARTHROPLASTY Left 1/30/2022    KNEE TOTAL ARTHROPLASTY REVISION, REMOVAL OF INTRAMEDULLARY NAIL performed by Jacqueline Zuñiga DO at [address]      left shoulder    UPPER GASTROINTESTINAL ENDOSCOPY  08/13/2018    removal of food bolus    UPPER GASTROINTESTINAL ENDOSCOPY N/A 08/13/2018    EGD FOREIGN BODY REMOVAL performed by Rowena Montero DO at [address] N/A 08/13/2018    EGD BIOPSY performed by Rowena Montero DO at [address]  08/16/2018    egd with balloon dilitation    UPPER GASTROINTESTINAL ENDOSCOPY  08/16/2018    EGD DILATION BALLOON performed by Derrell Ba MD at [address] 10/01/2018    EGD BIOPSY performed by Scherrie Nyhan, MD at [address] [place] [address]  10/01/2018    EGD DILATION BALLOON performed by Scherrie Nyhan, MD at [address] [place] [address] 11/19/2018    EGD DILATION BALLOON performed by Scherrie Nyhan, MD at [address] [place] [address] 10/15/2020    EGD SUBMUCOSAL/BOTOX INJECTION tattoo  performed by Juanita Johnson MD at [facility]    UPPER GASTROINTESTINAL ENDOSCOPY N/A 2021    EGD BIOPSY performed by Álvaro Ambriz MD at [facility] OR       Medications:      lidocaine  1 patch TransDERmal Daily    rivaroxaban  20 mg Oral Daily    metoprolol tartrate  25 mg Oral BID    docusate sodium  100 mg Oral Daily    cephALEXin  500 mg Oral 4x Daily    polyethylene glycol  17 g Oral BID    senna  1 tablet Oral Nightly    nystatin  5 mL Oral 4x Daily    acetaminophen  1,000 mg Oral 3 times per day    budesonide-formoterol  2 puff Inhalation BID    sodium chloride flush  5-40 mL IntraVENous 2 times per day    gabapentin  300 mg Oral Q8H    methocarbamol  750 mg Oral Q6H    bumetanide  2 mg Oral Daily    lipase-protease-amylase  24,000 Units Oral TID WC    rOPINIRole  0.25 mg Oral BID    pantoprazole  40 mg Oral QAM AC    tamsulosin  0.4 mg Oral Daily    ferrous sulfate  325 mg Oral Every Other Day       Social History:     Social History     Socioeconomic History    Marital status:      Spouse name: Not on file    Number of children: Not on file    Years of education: Not on file    Highest education level: Not on file   Occupational History    Not on file   Tobacco Use    Smoking status: Former Smoker     Packs/day: 1.00     Years: 20.00     Pack years: 20.00     Quit date: 1976     Years since quittin.1    Smokeless tobacco: Never Used   Vaping Use    Vaping Use: Never used   Substance and Sexual Activity    Alcohol use: No    Drug use: No    Sexual activity: Not on file   Other Topics Concern    Not on file   Social History Narrative    Not on file     Social Determinants of Health     Financial Resource Strain: Low Risk     Difficulty of Paying Living Expenses: Not hard at all   Food Insecurity: No Food Insecurity    Worried About Running Out of Food in the Last Year: Never true    [place] of Food in the Last Year: Never true   Transportation Needs:     Lack of Transportation (Medical): Not on file    Lack of Transportation (Non-Medical): Not on file   Physical Activity:     Days of Exercise per Week: Not on file    Minutes of Exercise per Session: Not on file   Stress:     Feeling of Stress : Not on file   Social Connections:     Frequency of Communication with Friends and Family: Not on file    Frequency of Social Gatherings with Friends and Family: Not on file    Attends Yarsani Services: Not on file    Active Member of [address] [place] [address] [employer] or Organizations: Not on file    Attends Club or Organization Meetings: Not on file    Marital Status: Not on file   Intimate Partner Violence:     Fear of Current or Ex-Partner: Not on file    Emotionally Abused: Not on file    Physically Abused: Not on file    Sexually Abused: Not on file   Housing Stability:     Unable to Pay for Housing in the Last Year: Not on file    Number of [place] in the Last Year: Not on file    Unstable Housing in the Last Year: Not on file       Family History:     Family History   Problem Relation Age of Onset    Cancer Mother     Heart Attack Father       Medical Decision Making:   I have independently reviewed/ordered the following labs:    CBC with Differential:   Recent Labs     02/05/22  0648 02/06/22  0656   WBC 5.5 5.8   HGB 7.5* 7.2*   HCT 23.3* 22.4*    218   LYMPHOPCT 18* 16*   MONOPCT 20* 21*     BMP:  No results for input(s): NA, K, CL, CO2, BUN, CREATININE, CA, MG in the last 72 hours. Hepatic Function Panel: No results for input(s): PROT, LABALBU, BILIDIR, IBILI, BILITOT, ALKPHOS, ALT, AST in the last 72 hours. No results for input(s): RPR in the last 72 hours. No results for input(s): HIV in the last 72 hours. No results for input(s): BC in the last 72 hours. Lab Results   Component Value Date    CREATININE 0.65 02/03/2022    GLUCOSE 96 02/03/2022    GLUCOSE 99 09/13/2011       Detailed results:         Thank you for allowing us to participate in the care of this patient. Please call with questions. This note is created with the assistance of a speech recognition program.  While intending to generate adocument that actually reflects the content of the visit, the document can still have some errors including those of syntax and sound a like substitutions which may escape proof reading. It such instances, actual meaningcan be extrapolated by contextual diversion.     JULIAN Schultz - CNP  Office: [phone]  Perfect serve / office [phone]

## 2022-02-07 NOTE — PROGRESS NOTES
Body Weight: 193 lb 9.6 oz (87.8 kg) (11/10/21 bed scale per chart review)     · Ideal Body Weight: 166 lbs; % Ideal Body Weight 103 %   · BMI: 24.5  · BMI Categories: Normal Weight (BMI 18.5-24. 9)       Nutrition Diagnosis:   · Increased nutrient needs related to increase demand for energy/nutrients as evidenced by wounds    · Inadequate oral intake related to  (decreased appetite) as evidenced by intake 0-25%,intake 26-50% (variable PO intakes; need for ONS)    Nutrition Interventions:   Food and/or Nutrient Delivery:  Continue Current Diet,Continue Oral Nutrition Supplement  Nutrition Education/Counseling:  No recommendation at this time   Coordination of Nutrition Care:  Continue to monitor while inpatient    Goals:  Meet 50% or greater of estimated nutrition needs       Nutrition Monitoring and Evaluation:   Behavioral-Environmental Outcomes:  None Identified   Food/Nutrient Intake Outcomes:  Food and Nutrient Intake,Supplement Intake  Physical Signs/Symptoms Outcomes:  Biochemical Data,GI Status,Fluid Status or Edema,Hemodynamic Status,Nutrition Focused Physical Findings,Skin,Weight     Discharge Planning:     Too soon to determine     Electronically signed by Bob Smith RD, LD on 2/7/22 at 3:04 PM EST    Contact: [phone]

## 2022-02-07 NOTE — CARE COORDINATION
Transitional planning-0930 called daughter Kizzy Lott is definitely not wanting [facility]. Patient is out of Isolation today. New choices for SNF- [address] [facility] [place], [facility], [facility], [facility], and [facility].    0175 called and left message with Sagar Chew at [facility] wanting him to go there. Referral faxed to [address] of [place]anna esqueda when called. Referral faxed to [facility] and left message with Gisela Gaona. [phone] call from [facility] referral faxed to [phone].  Hand faxed referral to [facility]

## 2022-02-07 NOTE — PROGRESS NOTES
Orthopedic Progress Note    Patient:  Belinda Fam  YOB: 1945     77 y.o. male    Subjective:  Patient seen and examined  No complaints or concerns  Pain controlled  Denies fever, HA, CP  Reports mild SOB overnight; RN reports spO2 in high 90s overnight    Vitals reviewed, afebrile    Objective:   Vitals:    02/07/22 0000   BP: 104/62   Pulse: 70   Resp: 16   Temp: 98.9 °F (37.2 °C)   SpO2: 97%     Gen: NAD, cooperative     Cardiovascular: regular rate, no dependent edema    Respiratory: No acute respiratory distress    LLE: Prevena on; with appropriate suction. No output in the cannister. Proximal dressings changed; surgical incision healing well without erythema, dehiscence or purulence. Sensation diffusely intact. Able to actively ROM ankle/digits. DP 1+. Recent Labs     02/06/22  0656   WBC 5.8   HGB 7.2*   HCT 22.4*         Meds: Gabbi Poag, keflex  See rec for complete list    Impression 77 y.o. male being seen for the following    - Left IT fracture s/p HWR/CMN insertion, poly exchange POD7    Plan    - Maintain prevena to the left knee; ensure appropriate suction  - Remaining surgical sites may be changed prn with soft dressings  - Continue with keflex 500 mg QID for a total of 14 days (2/2 start date)  - WBAT to LLE  - OK for chemical AC per ortho  - No further plans for surgery per ortho.  Follow up on discharge    Electronically signed by Lauren Mar DO 4:45 AM 2/7/2022

## 2022-02-07 NOTE — PROGRESS NOTES
Notified primary of low Bps, pt states taking midodrine but does not know dose, spoke to [facility] family pharmacy who states pt takes 5mg.

## 2022-02-07 NOTE — PLAN OF CARE
Problem: Airway Clearance - Ineffective  Goal: Achieve or maintain patent airway  2/7/2022 1103 by Nancy Vicente RN  Outcome: Ongoing  2/6/2022 2239 by Juventino Hooks RN  Outcome: Ongoing     Problem: Gas Exchange - Impaired  Goal: Absence of hypoxia  2/7/2022 1103 by Nancy Vicente RN  Outcome: Ongoing  2/6/2022 2239 by Juventino Hooks RN  Outcome: Ongoing  Goal: Promote optimal lung function  2/7/2022 1103 by Nancy Vicente RN  Outcome: Ongoing  2/6/2022 2239 by Juventino Hooks RN  Outcome: Ongoing     Problem: Breathing Pattern - Ineffective  Goal: Ability to achieve and maintain a regular respiratory rate  2/7/2022 1103 by Nancy Vicente RN  Outcome: Ongoing  2/6/2022 2239 by Juventino Hooks RN  Outcome: Ongoing     Problem:  Body Temperature -  Risk of, Imbalanced  Goal: Ability to maintain a body temperature within defined limits  2/7/2022 1103 by Nancy Vicente RN  Outcome: Ongoing  2/6/2022 2239 by Juventino Hooks RN  Outcome: Ongoing  Goal: Will regain or maintain usual level of consciousness  2/7/2022 1103 by Nancy Vicente RN  Outcome: Ongoing  2/6/2022 2239 by Juventino Hooks RN  Outcome: Ongoing  Goal: Complications related to the disease process, condition or treatment will be avoided or minimized  2/7/2022 1103 by Nancy Vicente RN  Outcome: Ongoing  2/6/2022 2239 by Juventino Hooks RN  Outcome: Ongoing     Problem: Isolation Precautions - Risk of Spread of Infection  Goal: Prevent transmission of infection  2/7/2022 1103 by Nancy Vicente RN  Outcome: Ongoing  2/6/2022 2239 by Juventino Hooks RN  Outcome: Ongoing     Problem: Nutrition Deficits  Goal: Optimize nutritional status  2/7/2022 1103 by Nancy Vicente RN  Outcome: Ongoing  2/6/2022 2239 by Juventino Hooks RN  Outcome: Ongoing     Problem: Risk for Fluid Volume Deficit  Goal: Maintain normal heart rhythm  2/7/2022 1103 by Nancy Vicente RN  Outcome: Ongoing  2/6/2022 2239 by Juventino Hooks RN  Outcome: Ongoing  Goal: Maintain absence of muscle cramping  2/7/2022 1103 by Talon Hoover RN  Outcome: Ongoing  2/6/2022 2239 by Estefani Costa RN  Outcome: Ongoing  Goal: Maintain normal serum potassium, sodium, calcium, phosphorus, and pH  2/7/2022 1103 by Talon Hoover RN  Outcome: Ongoing  2/6/2022 2239 by Estefani Costa RN  Outcome: Ongoing     Problem: Loneliness or Risk for Loneliness  Goal: Demonstrate positive use of time alone when socialization is not possible  2/7/2022 1103 by Talon Hoover RN  Outcome: Ongoing  2/6/2022 2239 by Estefani Costa RN  Outcome: Ongoing     Problem: Fatigue  Goal: Verbalize increase energy and improved vitality  2/7/2022 1103 by Talon Hoover RN  Outcome: Ongoing  2/6/2022 2239 by Estefani Costa RN  Outcome: Ongoing     Problem: Patient Education: Go to Patient Education Activity  Goal: Patient/Family Education  2/7/2022 1103 by Talon Hoover RN  Outcome: Ongoing  2/6/2022 2239 by Estefani Costa RN  Outcome: Ongoing     Problem: Pain:  Goal: Pain level will decrease  Description: Pain level will decrease  2/7/2022 1103 by Talon Hoover RN  Outcome: Ongoing  2/6/2022 2239 by Estefani Costa RN  Outcome: Ongoing  Goal: Control of acute pain  Description: Control of acute pain  2/7/2022 1103 by Talon Hoover RN  Outcome: Ongoing  2/6/2022 2239 by Estefani Costa RN  Outcome: Ongoing  Goal: Control of chronic pain  Description: Control of chronic pain  2/7/2022 1103 by Talon Hoover RN  Outcome: Ongoing  2/6/2022 2239 by Estefani Costa RN  Outcome: Ongoing     Problem: Skin Integrity:  Goal: Will show no infection signs and symptoms  Description: Will show no infection signs and symptoms  2/7/2022 1103 by Talon Hoover RN  Outcome: Ongoing  2/6/2022 2239 by Estefani Costa RN  Outcome: Ongoing  Goal: Absence of new skin breakdown  Description: Absence of new skin breakdown  2/7/2022 1103 by Talon Hoover RN  Outcome: Ongoing  2/6/2022 2239 by Estefani Costa RN  Outcome: Ongoing     Problem: Falls - Risk of:  Goal: Will remain free from falls  Description: Will remain free from falls  2/7/2022 1103 by Iman Orozco RN  Outcome: Ongoing  2/6/2022 2239 by Jose Mandel RN  Outcome: Ongoing  Goal: Absence of physical injury  Description: Absence of physical injury  2/7/2022 1103 by Iman Orozco RN  Outcome: Ongoing  2/6/2022 2239 by Jose Mandel RN  Outcome: Ongoing     Problem: Nutrition  Goal: Optimal nutrition therapy  2/7/2022 1103 by Iman Orozco RN  Outcome: Ongoing  2/6/2022 2239 by Jose Mandel RN  Outcome: Ongoing

## 2022-02-08 LAB
ABSOLUTE EOS #: 0.19 K/UL (ref 0–0.44)
ABSOLUTE IMMATURE GRANULOCYTE: 0.05 K/UL (ref 0–0.3)
ABSOLUTE LYMPH #: 0.78 K/UL (ref 1.1–3.7)
ABSOLUTE MONO #: 0.83 K/UL (ref 0.1–1.2)
BASOPHILS # BLD: 1 % (ref 0–2)
BASOPHILS ABSOLUTE: 0.04 K/UL (ref 0–0.2)
DIFFERENTIAL TYPE: ABNORMAL
EOSINOPHILS RELATIVE PERCENT: 3 % (ref 1–4)
HCT VFR BLD CALC: 24.3 % (ref 40.7–50.3)
HEMOGLOBIN: 7.9 G/DL (ref 13–17)
IMMATURE GRANULOCYTES: 1 %
LYMPHOCYTES # BLD: 13 % (ref 24–43)
MCH RBC QN AUTO: 32 PG (ref 25.2–33.5)
MCHC RBC AUTO-ENTMCNC: 32.5 G/DL (ref 28.4–34.8)
MCV RBC AUTO: 98.4 FL (ref 82.6–102.9)
MONOCYTES # BLD: 14 % (ref 3–12)
NRBC AUTOMATED: 0 PER 100 WBC
PDW BLD-RTO: 15.9 % (ref 11.8–14.4)
PLATELET # BLD: 214 K/UL (ref 138–453)
PLATELET ESTIMATE: ABNORMAL
PMV BLD AUTO: 8.9 FL (ref 8.1–13.5)
RBC # BLD: 2.47 M/UL (ref 4.21–5.77)
RBC # BLD: ABNORMAL 10*6/UL
SEG NEUTROPHILS: 69 % (ref 36–65)
SEGMENTED NEUTROPHILS ABSOLUTE COUNT: 4.27 K/UL (ref 1.5–8.1)
WBC # BLD: 6.2 K/UL (ref 3.5–11.3)
WBC # BLD: ABNORMAL 10*3/UL

## 2022-02-08 PROCEDURE — 97110 THERAPEUTIC EXERCISES: CPT

## 2022-02-08 PROCEDURE — 6370000000 HC RX 637 (ALT 250 FOR IP): Performed by: HEALTH CARE PROVIDER

## 2022-02-08 PROCEDURE — 97530 THERAPEUTIC ACTIVITIES: CPT

## 2022-02-08 PROCEDURE — 1200000000 HC SEMI PRIVATE

## 2022-02-08 PROCEDURE — 6370000000 HC RX 637 (ALT 250 FOR IP): Performed by: STUDENT IN AN ORGANIZED HEALTH CARE EDUCATION/TRAINING PROGRAM

## 2022-02-08 PROCEDURE — 6370000000 HC RX 637 (ALT 250 FOR IP): Performed by: NURSE PRACTITIONER

## 2022-02-08 PROCEDURE — 36415 COLL VENOUS BLD VENIPUNCTURE: CPT

## 2022-02-08 PROCEDURE — 94660 CPAP INITIATION&MGMT: CPT

## 2022-02-08 PROCEDURE — 85025 COMPLETE CBC W/AUTO DIFF WBC: CPT

## 2022-02-08 PROCEDURE — 2580000003 HC RX 258: Performed by: STUDENT IN AN ORGANIZED HEALTH CARE EDUCATION/TRAINING PROGRAM

## 2022-02-08 PROCEDURE — 99232 SBSQ HOSP IP/OBS MODERATE 35: CPT | Performed by: INTERNAL MEDICINE

## 2022-02-08 RX ORDER — SENNA PLUS 8.6 MG/1
1 TABLET ORAL NIGHTLY
Qty: 30 TABLET | Refills: 0
Start: 2022-02-08 | End: 2022-03-10

## 2022-02-08 RX ORDER — CEPHALEXIN 500 MG/1
500 CAPSULE ORAL 4 TIMES DAILY
Qty: 32 CAPSULE | Refills: 0
Start: 2022-02-08 | End: 2022-02-16

## 2022-02-08 RX ORDER — MIDODRINE HYDROCHLORIDE 5 MG/1
5 TABLET ORAL EVERY 8 HOURS
Qty: 90 TABLET | Refills: 0
Start: 2022-02-08

## 2022-02-08 RX ORDER — MAGNESIUM HYDROXIDE/ALUMINUM HYDROXICE/SIMETHICONE 120; 1200; 1200 MG/30ML; MG/30ML; MG/30ML
15 SUSPENSION ORAL DAILY PRN
Status: DISCONTINUED | OUTPATIENT
Start: 2022-02-08 | End: 2022-02-12 | Stop reason: HOSPADM

## 2022-02-08 RX ORDER — LIDOCAINE 4 G/G
2 PATCH TOPICAL DAILY
Status: DISCONTINUED | OUTPATIENT
Start: 2022-02-09 | End: 2022-02-12 | Stop reason: HOSPADM

## 2022-02-08 RX ORDER — METHOCARBAMOL 750 MG/1
750 TABLET, FILM COATED ORAL EVERY 6 HOURS
Qty: 40 TABLET | Refills: 0
Start: 2022-02-08 | End: 2022-02-18

## 2022-02-08 RX ORDER — MIDODRINE HYDROCHLORIDE 5 MG/1
5 TABLET ORAL EVERY 8 HOURS
Status: DISCONTINUED | OUTPATIENT
Start: 2022-02-08 | End: 2022-02-12 | Stop reason: HOSPADM

## 2022-02-08 RX ORDER — GABAPENTIN 300 MG/1
300 CAPSULE ORAL EVERY 8 HOURS
Qty: 21 CAPSULE | Refills: 0
Start: 2022-02-08 | End: 2022-02-15

## 2022-02-08 RX ADMIN — SODIUM CHLORIDE, PRESERVATIVE FREE 10 ML: 5 INJECTION INTRAVENOUS at 08:11

## 2022-02-08 RX ADMIN — Medication 500000 UNITS: at 08:09

## 2022-02-08 RX ADMIN — METHOCARBAMOL TABLETS 750 MG: 750 TABLET, COATED ORAL at 05:47

## 2022-02-08 RX ADMIN — METHOCARBAMOL TABLETS 750 MG: 750 TABLET, COATED ORAL at 11:22

## 2022-02-08 RX ADMIN — ROPINIROLE HYDROCHLORIDE 0.25 MG: 0.25 TABLET, FILM COATED ORAL at 21:35

## 2022-02-08 RX ADMIN — GABAPENTIN 300 MG: 300 CAPSULE ORAL at 05:47

## 2022-02-08 RX ADMIN — CEPHALEXIN 500 MG: 500 CAPSULE ORAL at 08:10

## 2022-02-08 RX ADMIN — MIDODRINE HYDROCHLORIDE 5 MG: 5 TABLET ORAL at 18:04

## 2022-02-08 RX ADMIN — RIVAROXABAN 20 MG: 20 TABLET, FILM COATED ORAL at 21:37

## 2022-02-08 RX ADMIN — PANCRELIPASE 24000 UNITS: 24000; 76000; 120000 CAPSULE, DELAYED RELEASE PELLETS ORAL at 11:22

## 2022-02-08 RX ADMIN — FERROUS SULFATE TAB EC 325 MG (65 MG FE EQUIVALENT) 325 MG: 325 (65 FE) TABLET DELAYED RESPONSE at 08:10

## 2022-02-08 RX ADMIN — PANCRELIPASE 24000 UNITS: 24000; 76000; 120000 CAPSULE, DELAYED RELEASE PELLETS ORAL at 18:02

## 2022-02-08 RX ADMIN — MIDODRINE HYDROCHLORIDE 5 MG: 5 TABLET ORAL at 08:10

## 2022-02-08 RX ADMIN — GABAPENTIN 300 MG: 300 CAPSULE ORAL at 21:35

## 2022-02-08 RX ADMIN — ROPINIROLE HYDROCHLORIDE 0.25 MG: 0.25 TABLET, FILM COATED ORAL at 08:10

## 2022-02-08 RX ADMIN — MAGNESIUM GLUCONATE 500 MG ORAL TABLET 400 MG: 500 TABLET ORAL at 11:22

## 2022-02-08 RX ADMIN — ACETAMINOPHEN 1000 MG: 325 TABLET ORAL at 05:06

## 2022-02-08 RX ADMIN — GABAPENTIN 300 MG: 300 CAPSULE ORAL at 13:40

## 2022-02-08 RX ADMIN — BUDESONIDE AND FORMOTEROL FUMARATE DIHYDRATE 2 PUFF: 160; 4.5 AEROSOL RESPIRATORY (INHALATION) at 08:11

## 2022-02-08 RX ADMIN — CEPHALEXIN 500 MG: 500 CAPSULE ORAL at 13:40

## 2022-02-08 RX ADMIN — METOPROLOL TARTRATE 12.5 MG: 25 TABLET ORAL at 21:37

## 2022-02-08 RX ADMIN — Medication 500000 UNITS: at 21:34

## 2022-02-08 RX ADMIN — CEPHALEXIN 500 MG: 500 CAPSULE ORAL at 21:36

## 2022-02-08 RX ADMIN — SENNOSIDES 8.6 MG: 8.6 TABLET, COATED ORAL at 21:37

## 2022-02-08 RX ADMIN — TAMSULOSIN HYDROCHLORIDE 0.4 MG: 0.4 CAPSULE ORAL at 08:10

## 2022-02-08 RX ADMIN — Medication 500000 UNITS: at 18:02

## 2022-02-08 RX ADMIN — METHOCARBAMOL TABLETS 750 MG: 750 TABLET, COATED ORAL at 18:02

## 2022-02-08 RX ADMIN — ONDANSETRON 4 MG: 4 TABLET, ORALLY DISINTEGRATING ORAL at 13:12

## 2022-02-08 RX ADMIN — DOCUSATE SODIUM 100 MG: 100 CAPSULE ORAL at 08:10

## 2022-02-08 RX ADMIN — SODIUM CHLORIDE, PRESERVATIVE FREE 10 ML: 5 INJECTION INTRAVENOUS at 21:35

## 2022-02-08 RX ADMIN — CEPHALEXIN 500 MG: 500 CAPSULE ORAL at 18:02

## 2022-02-08 RX ADMIN — PANTOPRAZOLE SODIUM 40 MG: 40 TABLET, DELAYED RELEASE ORAL at 05:06

## 2022-02-08 RX ADMIN — BUDESONIDE AND FORMOTEROL FUMARATE DIHYDRATE 2 PUFF: 160; 4.5 AEROSOL RESPIRATORY (INHALATION) at 21:44

## 2022-02-08 RX ADMIN — ACETAMINOPHEN 1000 MG: 325 TABLET ORAL at 13:40

## 2022-02-08 RX ADMIN — ACETAMINOPHEN 1000 MG: 325 TABLET ORAL at 21:33

## 2022-02-08 RX ADMIN — PANCRELIPASE 24000 UNITS: 24000; 76000; 120000 CAPSULE, DELAYED RELEASE PELLETS ORAL at 08:10

## 2022-02-08 ASSESSMENT — PAIN DESCRIPTION - PAIN TYPE
TYPE: ACUTE PAIN

## 2022-02-08 ASSESSMENT — PAIN DESCRIPTION - ORIENTATION
ORIENTATION: LEFT
ORIENTATION: LEFT

## 2022-02-08 ASSESSMENT — ENCOUNTER SYMPTOMS
EYE DISCHARGE: 0
COLOR CHANGE: 0
COUGH: 0
SHORTNESS OF BREATH: 0
APNEA: 0
ABDOMINAL DISTENTION: 0
VOMITING: 0
DIARRHEA: 0
NAUSEA: 0

## 2022-02-08 ASSESSMENT — PAIN SCALES - GENERAL
PAINLEVEL_OUTOF10: 8
PAINLEVEL_OUTOF10: 7

## 2022-02-08 ASSESSMENT — PAIN DESCRIPTION - LOCATION
LOCATION: KNEE;LEG
LOCATION: KNEE;LEG

## 2022-02-08 NOTE — PROGRESS NOTES
Notified primary that pt is rating headache a 7/10 and that he is requesting a lidocaine patch for his right knee for which he rates his pain a 8-9 out of 10, no new orders at this time.      1157: notified primary of patients request for something for gas / bloating   1219: BP 96/73 MAP 82, primary aware & ok with reading

## 2022-02-08 NOTE — PROGRESS NOTES
PROGRESS NOTE      PATIENT NAME: Skyla Perez RECORD NO. [id number]  DATE: 2/8/2022  PRIMARY CARE PHYSICIAN: Donnell Leonard, APRN - CNP    HD: # 10    ASSESSMENT    Patient Active Problem List   Diagnosis    Chronic atrial fibrillation (HCC)    Dyslipidemia    Gastroesophageal reflux disease without esophagitis    Osteoarthritis of lumbar spine    OA (osteoarthritis) of knee, bilateral    Foot drop, right    Hallux valgus, acquired, bilateral    Hearing impairment    Essential hypertension    Slow transit constipation    MARIAMA on CPAP    COPD (chronic obstructive pulmonary disease) (HCC)    Chronic diastolic heart failure (HCC)    History of bariatric surgery including banding leading to esophageal stricture and aspiration    BPH (benign prostatic hyperplasia)    Myalgia    Atrial fibrillation with slow ventricular response (HCC)    Pacemaker pocket hematoma, initial encounter    Pulmonary hypertension ([facility] [address]    Presence of permanent cardiac pacemaker    Fall at home, initial encounter    Left displaced femoral neck fracture ([facility] [address]    Retained orthopedic hardware    COVID-19    Elevated C-reactive protein (CRP)       MEDICAL DECISION MAKING AND PLAN    Neuro -MMPT - tylenol, gabapentin, robaxin, home requip    CV - chronic afib, rate controlled. BP remains systolic 02-03E, asymptomatic, the last few days. Metoprolol already decreased from 50 to 25mg BID, several held doses this week   Will decrease to 12.5mg bid    Restarted home midodrene 2/8    Heme: Hgb stable on home xarelto. S/p 1 unit prbc, EOD iron supplement. Every other day cbc. Pulm - encourage incentive spirometry, cont COPD meds. Desat to 80s overnight, restart home CPAP, if desat again while awake/on cpap will complete CXR    Renal - voiding spontaneously. Limited I/O charting. EOD BMP, encourage po intake. Cont home flomax. GI - puree diet (prior gastric surg), creon, supplement. Bowel regimen.      ID -   Infectious disease on board for covid, mildly symptomatic on presentation. Received Sotrivimab . Out iso . Placed on Keflex by ortho on . Recommend 14 day course due to continued drainage in the left knee after surgery and history of left TKA. Nystatin for oral thrush, 14 day complete     Endo - no acute issues    MSK - status post left femur CMN, weightbearing as tolerated left lower extremity, Encourage PT/OT    Dispo - pending rehab placement    Chief Complaint: [facility] with right hip IT fracture s/p right femoral HWR and CMN insertion    SUBJECTIVE    Waynette Shone was seen and examined at bedside this morning. Pain improving. Just feels tired and weak in general.  Swelling in knee improving. No drainage from prevena. OBJECTIVE  VITALS: Temp: Temp: 97.6 °F (36.4 °C)Temp  Av.5 °F (36.9 °C)  Min: 97.6 °F (36.4 °C)  Max: 99.1 °F (04.5 °C) BP Systolic (46BHY), JYK:67 , Min:83 , ZQP:830   Diastolic (74ZTI), NWU:72, Min:44, Max:68   Pulse Pulse  Av.6  Min: 70  Max: 80 Resp Resp  Av.1  Min: 13  Max: 20 Pulse ox SpO2  Av.5 %  Min: 93 %  Max: 100 %  CONSTITUTIONAL: awake, alert, cooperative, no apparent distress  ENT: ears are symmetric, nares patent   HEENT: moist mucous membranes  NECK: Supple, symmetrical, trachea midline  LUNGS: no respiratory distress, no audible wheezing, no chest wall tenderness, AICD palpable in left chest  CARDIOVASCULAR: Regular rate and rhythm  ABDOMEN: soft, nontender, nondistended, no guarding, no rebound tenderness   MUSCULOSKELETAL: LLE: Prevena on, with sponge suctioned down and no pooling of blood under sponge. Suyapa Mortimer is c/d/i. No sign of strike through bleeding. Dressings at the hip c/d/i. Appropriate tenderness to palpation about the hip and knee.   NEUROLOGIC: Awake, alert, oriented to name, place and time  EXTREMITIES: peripheral pulses normal, + pedal edema left  SKIN: normal coloration and turgor other than resolving bruising on the left knee    I/O last 3 completed shifts: In: 360 [P.O.:360]  Out: 2225 [Urine:2225]    Drain/tube output: In: 300 [P.O.:300]  Out: 1625 [Urine:1625]    LAB:  CBC:   Recent Labs     02/06/22  0656 02/07/22  1934 02/08/22  0503   WBC 5.8  --  6.2   HGB 7.2* 8.2* 7.9*   HCT 22.4* 25.0* 24.3*   MCV 97.4  --  98.4     --  214     BMP:   No results for input(s): NA, K, CL, CO2, BUN, CREATININE, GLUCOSE in the last 72 hours. COAGS: No results for input(s): APTT, PROT, INR in the last 72 hours. RADIOLOGY:  No results found. Electronically signed by Ashely Faria MD on 2/8/2022 at 8:20 AM     Due to the current efforts to prevent transmission of COVID-19 and also the need to preserve PPE for other caregivers, a face-to-face encounter with the patient was not performed. All relevant records and diagnostic tests were reviewed, including laboratory results and imaging. Physical exam was performed by bedside nursing and confirmed during telemedicine rounds. Multi-organ system plan was formulated by the trauma team and conveyed to bedside nursing staff through telemedicine. Decrease beta blocker. DC planning.

## 2022-02-08 NOTE — PROGRESS NOTES
Orthopedic Progress Note    Patient:  Dev Robbins  YOB: 1945     77 y.o. male    Subjective:  Patient seen and examined at bedside this morning  Still noting muscle spasms in his left knee. Complains of the feeling of being unable to flex at the hip. States he feels like his knee guille when he is attempting to ambulate with PT. No issue overnight, although SOB overnight requiring a CPAP in which pt uses at home  Pain controlled, resting comfortably  Denies fever, HA, CP, SOB, N/V, dysuria  PT assessment: able to ambulate 6 feet with RW    Vitals reviewed, afebrile    Objective:   Vitals:    02/08/22 0400   BP: 111/65   Pulse: 70   Resp: 20   Temp: 97.9 °F (36.6 °C)   SpO2: 99%     Gen: NAD, cooperative    Cardiovascular: Regular rate    Respiratory: Chest symmetric, no accessory muscle use    LLE: Prevena on, mainting suction with no output in cannister. Surrounding dressings on, which are C/D/I. One incision on anterior hip uncovered with staples in place. Surrounding skin without erythema and incision well approximated. Mild tenderness to palpation about the hip. Compartments soft and easily compressible. EHL/FHL/TA/GS complex motor intact. Sural/saphenous/SPN/DPN/plantar nerve distribution SILT. DP and PT pulses 2+ with BCR. Recent Labs     02/06/22  0656 02/06/22  0656 02/07/22 1934   WBC 5.8  --   --    HGB 7.2*   < > 8.2*   HCT 22.4*   < > 25.0*     --   --     < > = values in this interval not displayed.       ABX: Keflex  Anticoag: Xarelto   See rec for complete list    Impression/plan: 77 y.o. male who [address] being seen for the following     - Left IT fracture s/p HWR/CMN insertion, poly exchange, DOS 1/30/22 (POD9)       -Maintain prevena to L knee, monitor output and ensure proper suction  -Surgical site dressings changed yesterday (2/7) by ortho team, OK to change as needed  -WB status: WBAT LLE  -CBC labs pending  -Continue with keflex 500 mg QID for a total of 14 days (2/2 start date, end date 2/16)  -Pain control/DVT regimen per primary team   -Ice (20 min, 1 hour off) for edema/pain control  -Encourage deep breathing and incentive spirometry use  -Continue to work with PT/OT  OK to DC per ortho standpoint and f/u with Dr. Shelly Anglin in 10-14 days from surgery. Patient's scheduled post op visit is scheduled for tomorrow (2/9). -Please page ortho with any questions    Caden Perdomo DO  Orthopedic Surgery Resident, PGY-1  [address] [place]      PGY-2 Addendum    Patient seen and examined. Agree with above. 77 y.o. male being seen after the aforementioned injury. Patient overall doing well but is still complaining of spasms to the left knee as well as a sensation of the knee buckling under him while trying to ambulate. He has acute complaints this morning. States that he was scheduled to go to rehab yesterday but was denied by insurance. Arturo Salvia on left knee maintaining suction with no output in canister. Surrounding dressings c/d/i although anterior incision at hip open to air with staples in place, with surrounding skin without surrounding erythema or drainage. Sensation intact to light touch. EHL/FHL/TA/GSC gross motor intact. DP pulse 2+. OK for discharge from ortho perspective. Patient's post op visit with ortho tomorrow, 2/9. If still admitted, will continue to assess patient. Patient to continue keflex until 2/16. Please page ortho on call with questions.      Darío Ernandez DO, PGY-2  Orthopedic Surgery Resident  [id number]

## 2022-02-08 NOTE — PROGRESS NOTES
[facility] -   Infectious diseases evaluation  admission date 1/28/2022    reason for consultation:   covid    Impression :   Current:  · Left hip intertrochanteric fracture after a fall  · 1/30  S/p Removal of deep orthopedic hardware left femur  · Cephalomedullary  nail fixation left intertrochanteric femur fracture. · Covid positive, no hypoxemia  · Elevated CRP  · Right lower lobe atelectatic lesions, less likely Covid related  · Underlying COPD  Other:  ·   Discussion / summary of stay / plan of care   ·   Recommendations   · Low  CRP and ferritin  · sotrivimab for asymptomatic Covid in a high risk patient. Received 1/29. · [id number] Arline Echevarria for DC - ID signing off    Isolate until 2/7/22  Infection Control Recommendations   · [place] Precautions  · Contact Isolation   · Airborne isolation  · Droplet Isolation      Antimicrobial Stewardship Recommendations   · Off ab    Coordination ofOutpatient Care:   · Estimated Length of IV antimicrobials:  · Patient will need Midline / picc Catheter Insertion:   · Patient will need SNF:  · Patient will need outpatient wound care:     History of Present Illness:   Initial history:  Priya Mesa is a 77y.o.-year-old male fell and broke his hip, left intertrochanteric, Ortho on board planning surgical repair. , found to have Covid positive, underlying COPD  Infectious consulted due to Covid positive    Patient on no oxygen  CT scan of the chest reviewed personally, right lower lobe small atelectasis areas, but cannot rule out Covid related-those lesions seem to be isolated in that area, possibly suggestive of atelectasis    Also has A. fib, and has a pacemaker, EF 50 and no valvular disease      1/31   Afebrile. SpO2 100% on 4 L NC. Transfused with 1 unit PRBCs. No SOB. Resting. Cre increasing, 1.33    2/1  Afebrile. SpO2 100% on 2 L NC. Cre improving. 2/2  Afebrile. SpO2 99% on 3 L NC  Slightly SOB with activity.   Pain controlled. Cre improved. 2/3  Low-grade fever 99. SpO2 96% on RA.    2/4  Afebrile. SpO2 97% on RA. WBC wnl.    2/7  Afebrile. SpO2 98% on RA. Pain controlled. Remains stable from ID standpoint. Working towards discharge. 2/8  Left knee pain - VAC on knee and some bruising  On RA and not SOB - so2 100  appropriate      Interval changes  2/8/2022   Patient Vitals for the past 8 hrs:   BP Temp Temp src Pulse Resp SpO2 Weight   02/08/22 0945 111/66 -- -- 76 15 98 % --   02/08/22 0930 (!) 101/53 -- -- 78 14 98 % --   02/08/22 0915 (!) 97/54 -- -- 73 17 100 % --   02/08/22 0900 (!) 93/53 -- -- 73 20 100 % --   02/08/22 0845 (!) 122/54 -- -- 77 17 100 % --   02/08/22 0830 (!) 96/57 -- -- 77 16 99 % --   02/08/22 0815 (!) 118/53 -- -- 71 14 97 % --   02/08/22 0805 (!) 84/53 -- -- 70 13 94 % --   02/08/22 0800 (!) 83/44 97.6 °F (36.4 °C) Oral 70 14 96 % --   02/08/22 0400 111/65 97.9 °F (36.6 °C) Oral 70 20 99 % 202 lb 6.4 oz (91.8 kg)       Summary of relevant labs:  Labs:  W 7-10.7-7.6-5.9-5.7-5.2  Creat  0.8-1.33-1.12-0.80-0.65  CRP 15  Ferritin  274    Micro:  covid +    Imaging:  Chest x-ray negative    CT abdomen pelvis and chest  Right lower lobe small atelectasis, cannot rule out early COVID pneumonia        I have personally reviewed the past medical history, past surgical history, medications, social history, and family history, and I haveupdated the database accordingly. Allergies:   Darvocet [propoxyphene n-acetaminophen], Other, Oxycodone-acetaminophen, and Propoxyphene     Review of Systems:     Review of Systems   Constitutional: Positive for activity change. HENT: Negative for congestion. Eyes: Negative for discharge. Respiratory: Negative for apnea, cough and shortness of breath. Slightly SOB with activity. Cardiovascular: Negative for chest pain. Gastrointestinal: Negative for abdominal distention, diarrhea, nausea and vomiting. Endocrine: Negative for polydipsia. Genitourinary: Negative for dysuria. Musculoskeletal: Positive for arthralgias. Skin: Negative for color change. Allergic/Immunologic: Negative for immunocompromised state. Neurological: Negative for dizziness. Hematological: Negative for adenopathy. Psychiatric/Behavioral: Negative for agitation. Physical Examination :       Physical Exam  Vitals and nursing note reviewed. Constitutional:       Appearance: Normal appearance. He is not ill-appearing. HENT:      Head: Normocephalic and atraumatic. Right Ear: External ear normal.      Left Ear: External ear normal.      Nose: Nose normal.      Mouth/Throat:      Mouth: Mucous membranes are moist.      Pharynx: Oropharynx is clear. Eyes:      General: No scleral icterus. Extraocular Movements: Extraocular movements intact. Conjunctiva/sclera: Conjunctivae normal.      Pupils: Pupils are equal, round, and reactive to light. Cardiovascular:      Rate and Rhythm: Normal rate and regular rhythm. Heart sounds: Normal heart sounds. No murmur heard. No friction rub. Pulmonary:      Effort: Pulmonary effort is normal. No respiratory distress. Breath sounds: No stridor. No rhonchi. Comments: Diminished t/o. Abdominal:      General: Bowel sounds are normal. There is no distension. Palpations: Abdomen is soft. There is no mass. Tenderness: There is no abdominal tenderness. Hernia: No hernia is present. Genitourinary:     Comments: No concepcion. Musculoskeletal:         General: No swelling or deformity. Cervical back: Normal range of motion and neck supple. No rigidity. Skin:     General: Skin is warm and dry. Capillary Refill: Capillary refill takes less than 2 seconds. Coloration: Skin is not jaundiced. Neurological:      Mental Status: He is alert and oriented to person, place, and time.    Psychiatric:         Mood and Affect: Mood normal.         Behavior: Behavior normal. Thought Content:  Thought content normal.         Past Medical History:     Past Medical History:   Diagnosis Date    Acute superficial gastritis without hemorrhage 05/26/2018    Anastomotic stricture of stomach     Asthma     Atrial fibrillation ([facility]     On Xarelto 6/27/2020    Calculus of gallbladder without cholecystitis without obstruction 05/02/2017    Chronic diastolic heart failure ([address] 11/17/2017    Chronic rhinosinusitis 04/13/2015    Class 2 severe obesity due to excess calories with serious comorbidity and body mass index (BMI) of 36.0 to 36.9 in adult [facility]) 11/17/2017    COPD (chronic obstructive pulmonary disease) ([facility]     E. coli septicemia ([facility]     E. coli UTI     Foot drop, right 07/10/2012    Fracture of femur ([facility] 10/23/2016    Gait disorder 07/20/2016    Gastric out let obstruction     GERD (gastroesophageal reflux disease)     Hallux valgus, acquired, bilateral 06/24/2015    Hyperlipidemia     Hypertension     Impaired hearing 04/13/2015    Internal hemorrhoids 01/03/2017    Lymphedema of both lower extremities 06/24/2015    Nausea & vomiting 11/16/2018    OA (osteoarthritis) of knee, bilateral 12/11/2006    Obesity     MARIAMA on CPAP 05/02/2017    Osteoarthritis     Osteoarthritis of lumbar spine 12/13/2007     replace inactive diagnosis    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 coli ([facility]     Due to UTI     Sick sinus syndrome (HCC)     Simple chronic bronchitis ([facility] 04/10/2018    Slow transit constipation 11/03/2016    Unspecified sleep apnea     UTI (urinary tract infection)        Past Surgical  History:     Past Surgical History:   Procedure Laterality Date    CARPAL TUNNEL RELEASE      bilateral    COLONOSCOPY      COLONOSCOPY N/A 04/05/2021    COLONOSCOPY DIAGNOSTIC performed by Joanna Hall MD at [address]  01/02/2019    by Dr. Cailin Morales N/A 01/02/2019    CYSTOSCOPY performed by Stefano Fontanez MD Bee at [address] Left 1/30/2022    LEFT CEPHALOMEDULLARY NAIL INSERTION performed by Pao Ventura DO at [facility] [address]    first knuckle right index finger   [facility]    vertical banded gastroplasty    HEMORRHOID SURGERY      HIP SURGERY Left 01/30/2022     LEFT CEPHALOMEDULLARY NAIL INSERTION    HIP SURGERY Left 01/30/2022    LEFT CEPHALOMEDULLARY NAIL INSERTION (Left Hip)     JOINT REPLACEMENT  2004 & 2005    bilateral knees    KNEE SURGERY Left 01/30/2022    KNEE TOTAL ARTHROPLASTY REVISION, REMOVAL OF INTRAMEDULLARY NAIL     PACEMAKER INSERTION  04/09/2021    [place], placed by Dr. Samreen Steele EGD [address] [facility] [address] N/A 08/16/2018    EGD FOREIGN BODY REMOVAL performed by Sheryl Price MD at [address] [facility] EGD TRANSORAL BIOPSY SINGLE/MULTIPLE N/A 05/25/2018    EGD BIOPSY performed by Barrington Rojo DO at [address] Left 01/30/2022    KNEE TOTAL ARTHROPLASTY REVISION, REMOVAL OF INTRAMEDULLARY NAIL (Left     REVISION TOTAL KNEE ARTHROPLASTY Left 1/30/2022    KNEE TOTAL ARTHROPLASTY REVISION, REMOVAL OF INTRAMEDULLARY NAIL performed by Pao Ventura DO at [facility]      left shoulder    UPPER GASTROINTESTINAL ENDOSCOPY  08/13/2018    removal of food bolus    UPPER GASTROINTESTINAL ENDOSCOPY N/A 08/13/2018    EGD FOREIGN BODY REMOVAL performed by Barrington Rojo DO at [address] N/A 08/13/2018    EGD BIOPSY performed by Barrington Rojo DO at [address]  08/16/2018    egd with balloon dilitation    UPPER GASTROINTESTINAL ENDOSCOPY  08/16/2018    EGD DILATION BALLOON performed by Sheryl Price MD at [address] [facility] 10/01/2018    EGD BIOPSY performed by Neida Patel MD at [facility] 10/01/2018    EGD DILATION BALLOON performed by Neida Patel MD at [address] [place] [address] N/A 2018    EGD DILATION BALLOON performed by Neida Patel MD at [address] [place] [address] N/A 10/15/2020    EGD SUBMUCOSAL/BOTOX INJECTION tattoo  performed by Sara Mcintosh MD at [address] [place] [address] N/A 2021    EGD BIOPSY performed by Amarjit Amin MD at [facility] OR       Medications:      midodrine  5 mg Oral Daily    lidocaine  1 patch TransDERmal Daily    rivaroxaban  20 mg Oral Daily    [Held by provider] metoprolol tartrate  25 mg Oral BID    docusate sodium  100 mg Oral Daily    cephALEXin  500 mg Oral 4x Daily    polyethylene glycol  17 g Oral BID    senna  1 tablet Oral Nightly    nystatin  5 mL Oral 4x Daily    acetaminophen  1,000 mg Oral 3 times per day    budesonide-formoterol  2 puff Inhalation BID    sodium chloride flush  5-40 mL IntraVENous 2 times per day    gabapentin  300 mg Oral Q8H    methocarbamol  750 mg Oral Q6H    [Held by provider] bumetanide  2 mg Oral Daily    lipase-protease-amylase  24,000 Units Oral TID WC    rOPINIRole  0.25 mg Oral BID    pantoprazole  40 mg Oral QAM AC    tamsulosin  0.4 mg Oral Daily    ferrous sulfate  325 mg Oral Every Other Day       Social History:     Social History     Socioeconomic History    Marital status:      Spouse name: Not on file    Number of children: Not on file    Years of education: Not on file    Highest education level: Not on file   Occupational History    Not on file   Tobacco Use    Smoking status: Former Smoker     Packs/day: 1.00     Years: 20.00     Pack years: 20.00     Quit date: 1976     Years since quittin.1    Smokeless tobacco: Never Used   Vaping Use    Vaping Use: Never used   Substance and Sexual Activity    Alcohol use: No    Drug use: No    Sexual activity: Not on file   Other Topics Concern    Not on file   Social History Narrative    Not on file     Social Determinants of Health     Financial Resource Strain: Low Risk     Difficulty of Paying Living Expenses: Not hard at all   Food Insecurity: No Food Insecurity    Worried About Running Out of Food in the Last Year: Never true    [address] in the Last Year: Never true   Transportation Needs:     Lack of Transportation (Medical): Not on file    Lack of Transportation (Non-Medical): Not on file   Physical Activity:     Days of Exercise per Week: Not on file    Minutes of Exercise per Session: Not on file   Stress:     Feeling of Stress : Not on file   Social Connections:     Frequency of Communication with Friends and Family: Not on file    Frequency of Social Gatherings with Friends and Family: Not on file    Attends Gnosticist Services: Not on file    Active Member of [address] [place] [address] [employer] or Organizations: Not on file    Attends Club or Organization Meetings: Not on file    Marital Status: Not on file   Intimate Partner Violence:     Fear of Current or Ex-Partner: Not on file    Emotionally Abused: Not on file    Physically Abused: Not on file    Sexually Abused: Not on file   Housing Stability:     Unable to Pay for Housing in the Last Year: Not on file    Number of [place] in the Last Year: Not on file    Unstable Housing in the Last Year: Not on file       Family History:     Family History   Problem Relation Age of Onset    Cancer Mother     Heart Attack Father       Medical Decision Making:   I have independently reviewed/ordered the following labs:    CBC with Differential:   Recent Labs     02/06/22  0656 02/06/22  0656 02/07/22  1934 02/08/22  0503   WBC 5.8  --   --  6.2   HGB 7.2*   < > 8.2* 7.9*   HCT 22.4*   < > 25.0* 24.3*     --   --  214   LYMPHOPCT 16*  --   --  13*   MONOPCT 21*  --   --  14*    < > = values in this interval not displayed.      BMP:  No results for input(s): NA, K, CL, CO2, BUN, CREATININE, CA, MG in the last 72 hours. Hepatic Function Panel: No results for input(s): PROT, LABALBU, BILIDIR, IBILI, BILITOT, ALKPHOS, ALT, AST in the last 72 hours. No results for input(s): RPR in the last 72 hours. No results for input(s): HIV in the last 72 hours. No results for input(s): BC in the last 72 hours. Lab Results   Component Value Date    CREATININE 0.65 02/03/2022    GLUCOSE 96 02/03/2022    GLUCOSE 99 09/13/2011       Detailed results: Thank you for allowing us to participate in the care of this patient. Please call with questions. This note is created with the assistance of a speech recognition program.  While intending to generate adocument that actually reflects the content of the visit, the document can still have some errors including those of syntax and sound a like substitutions which may escape proof reading. It such instances, actual meaningcan be extrapolated by contextual diversion.     Pratibha Tang MD  Office: [phone]  Perfect serve / office [phone]

## 2022-02-08 NOTE — PROGRESS NOTES
Physical Therapy  Facility/Department: [facility] CAR 2  Daily Treatment Note  NAME: Enid Moore  : 1945  MRN: [id number]    Date of Service: 2022    Discharge Recommendations:  Patient would benefit from continued therapy after discharge        Assessment   Body structures, Functions, Activity limitations: Decreased functional mobility ; Decreased posture;Decreased endurance;Decreased ROM; Decreased strength;Decreased balance; Increased pain  Assessment: Pt with mobility deficits requiring mod-A to perform sit<>stand transfer, modA x 2 with RW support during 1 step fwd/retro with R LE only with max verbal cues for posture with support to L knee to decrease risk of buckling. Flavio Carroll from bed-->recliner Pt requires max verbal cues for sequencing with ambulation, remains unable to advance LLE without physical assistance. Pt would be unsafe to return to prior living arrangements upon discharge. Pt would benefit from additional intense PT to address deficits. Prognosis: Good  PT Education: Goals;PT Role;General Safety;Weight-bearing Education;Plan of Care;Gait Training;Functional Mobility Training;Transfer Training  Patient Education: Pt stated he is performing B ankle pumps, quad sets, glut sets t/o the day. REQUIRES PT FOLLOW UP: Yes  Activity Tolerance  Activity Tolerance: Patient limited by endurance; Patient limited by pain  Activity Tolerance: Pt motivated to progress. Patient Diagnosis(es): The encounter diagnosis was Left displaced femoral neck fracture ([address]      has a past medical history of Acute superficial gastritis without hemorrhage, Anastomotic stricture of stomach, Asthma, Atrial fibrillation ([address] Calculus of gallbladder without cholecystitis without obstruction, Chronic diastolic heart failure (HCC), Chronic rhinosinusitis, Class 2 severe obesity due to excess calories with serious comorbidity and body mass index (BMI) of 36.0 to 36.9 in [facility]), COPD (chronic obstructive pulmonary disease) ([address] E. coli septicemia ([address] E. coli UTI, Foot drop, right, Fracture of femur ([address] Gait disorder, Gastric out let obstruction, GERD (gastroesophageal reflux disease), Hallux valgus, acquired, bilateral, Hyperlipidemia, Hypertension, Impaired hearing, Internal hemorrhoids, Lymphedema of both lower extremities, Nausea & vomiting, OA (osteoarthritis) of knee, bilateral, Obesity, MARIAMA on CPAP, Osteoarthritis, Osteoarthritis of lumbar spine, S/P revision of total knee, Septicemia due to E. coli ([address] Sick sinus syndrome ([address] Simple chronic bronchitis ([address] Slow transit constipation, Unspecified sleep apnea, and UTI (urinary tract infection). has a past surgical history that includes Finger amputation (1966); Gastric bypass surgery (1985); Carpal tunnel release; Colonoscopy; Rotator cuff repair; joint replacement (2004 & 2005); Hemorrhoid surgery; pr egd transoral biopsy single/multiple (N/A, 05/25/2018); Upper gastrointestinal endoscopy (08/13/2018); Upper gastrointestinal endoscopy (N/A, 08/13/2018); Upper gastrointestinal endoscopy (N/A, 08/13/2018); Upper gastrointestinal endoscopy (08/16/2018); pr egd flexible foreign body removal (N/A, 08/16/2018); Upper gastrointestinal endoscopy (08/16/2018); Upper gastrointestinal endoscopy (N/A, 10/01/2018); Upper gastrointestinal endoscopy (10/01/2018); Upper gastrointestinal endoscopy (N/A, 11/19/2018); Cystoscopy (01/02/2019); Cystoscopy (N/A, 01/02/2019); Upper gastrointestinal endoscopy (N/A, 10/15/2020); Colonoscopy (N/A, 04/05/2021); Pacemaker insertion (04/09/2021); Upper gastrointestinal endoscopy (N/A, 7/16/2021); hip surgery (Left, 01/30/2022); knee surgery (Left, 01/30/2022); hip surgery (Left, 01/30/2022); Revision Total Knee Arthroplasty (Left, 01/30/2022); Femur fracture surgery (Left, 1/30/2022); and Revision total knee arthroplasty (Left, 1/30/2022).     Restrictions  Restrictions/Precautions  Restrictions/Precautions: Weight Bearing,Fall Risk,Contact Precautions  Required Braces or Orthoses?: Yes  Lower Extremity Weight Bearing Restrictions  Left Lower Extremity Weight Bearing: Weight Bearing As Tolerated  Required Braces or Orthoses  Right Lower Extremity Brace: Ankle Foot Orthotics  Left Lower Extremity Brace: Erika Foot Orthotics  Position Activity Restriction  Other position/activity restrictions: 1/30 L IT fx s/p HWR w/ CMN-fixation; wears BLE AFOs at baseline d/t dropfoot; wound vac to L knee, watch BP and HR  Subjective   General  Chart Reviewed: Yes  Response To Previous Treatment: Patient with no complaints from previous session. Family / Caregiver Present: No  Subjective  Subjective: RN and Pt  agreeable to therapy. Pt supine in bed upon arrival. Pt pleasant and cooperative throughout today's session. Pain Screening  Patient Currently in Pain: Yes  Pain Assessment  Pain Level: 7  Pain Type: Acute pain  Pain Location: Knee;Leg (L hip pain with mvmt)  Pain Orientation: Left  Vital Signs  Patient Currently in Pain: Yes       Orientation     Cognition      Objective   Bed mobility  Supine to Sit: Moderate assistance  Scooting: Minimal assistance  Comment: HOB slightly elevated when assessed. Transfers  Sit to Stand: Moderate Assistance  Stand to sit: Moderate Assistance  Comment: Increased time/effort to perform. Pt motivated to progress. Ambulation  Ambulation?: Yes  WB Status: WBAT LLE  Ambulation 1  Surface: level tile  Device: Rolling Walker  Other Apparatus: AFO; Left;Right  Assistance: Moderate assistance;2 Person assistance  Distance: 1 step fwd/retro with R LE ONLY x 4 reps  Comments: Support at L knee to decrease risk of buckling. Max verbal cues for posture. Ashley Brisk stedy to bedside chair.      Balance  Posture: Fair  Sitting - Static: Good;-  Sitting - Dynamic: Fair;+  Standing - Static: Fair;-  Standing - Dynamic: Poor;+  Comments: standing balance assessed while using a RW  Exercises  Quad Sets: x 15 reps Bilaterally  Hip Flexion: x 15  BLE in sitting AAROM LLE  Hip Abduction: x 15 reps AAROM to L LE and AROM to R LE  Knee Long Arc Quad: x 15 BLE in sitting AAROM LLE  Knee Short Arc Quad: x 15 reps Bilaterally  Comments: Pt sat at EOB with CGA/SBA. AM-PAC Score  AM-PAC Inpatient Mobility Raw Score : 10 (02/08/22 1556)  AM-PAC Inpatient T-Scale Score : 32.29 (02/08/22 1556)  Mobility Inpatient CMS 0-100% Score: 76.75 (02/08/22 1556)  Mobility Inpatient CMS G-Code Modifier : CL (02/08/22 1556)          Goals  Short term goals  Time Frame for Short term goals: 14 visits  Short term goal 1: Pt will ambulate 125 feet with least restrictive device and CGA to increase functional independence. Short term goal 2: Pt will tolerate a 35 minute therapy session to promote increased endurance. Short term goal 3: Pt will demonstrate good- standing balance to decrease fall risk. Short term goal 4: Pt will negotiate 5 stairs with no handrails and a SPC to allow the pt to enter prior living arrangements. Short term goal 5: Pt will perform sit<>stand transfer with SBA to increase functional independence.     Plan    Plan  Times per week: 6-7  Times per day: Daily  Current Treatment Recommendations: Strengthening,Transfer Training,Endurance Training,ROM,Balance Training,Gait Training,Functional Mobility Training,Stair training,Safety Education & Training,Home Exercise Program,Equipment Evaluation, Education, & procurement,Patient/Caregiver Education & Training  Safety Devices  Type of devices: Nurse notified,All fall risk precautions in place,Gait belt,Call light within reach,Left in chair,Chair alarm in place  Restraints  Initially in place: No     Therapy Time   Individual Concurrent Group Co-treatment   Time In 1502         Time Out 1547         Minutes 45         Timed Code Treatment Minutes: One [facility] Lacks

## 2022-02-08 NOTE — PLAN OF CARE
Problem: Airway Clearance - Ineffective  Goal: Achieve or maintain patent airway  2/7/2022 2154 by Elvis Mojica RN  Outcome: Ongoing  2/7/2022 1103 by Lupis Murillo RN  Outcome: Ongoing     Problem: Gas Exchange - Impaired  Goal: Absence of hypoxia  2/7/2022 2154 by Elvis Mojica RN  Outcome: Ongoing  2/7/2022 1103 by Lupis Murillo RN  Outcome: Ongoing  Goal: Promote optimal lung function  2/7/2022 2154 by Elvis Mojica RN  Outcome: Ongoing  2/7/2022 1103 by Lupis Murillo RN  Outcome: Ongoing     Problem: Breathing Pattern - Ineffective  Goal: Ability to achieve and maintain a regular respiratory rate  2/7/2022 2154 by Elvis Mojica RN  Outcome: Ongoing  2/7/2022 1103 by Lupis Murillo RN  Outcome: Ongoing     Problem:  Body Temperature -  Risk of, Imbalanced  Goal: Ability to maintain a body temperature within defined limits  2/7/2022 2154 by Elvis Mojica RN  Outcome: Ongoing  2/7/2022 1103 by Lupis Murillo RN  Outcome: Ongoing  Goal: Will regain or maintain usual level of consciousness  2/7/2022 2154 by Elvis Mojica RN  Outcome: Ongoing  2/7/2022 1103 by Lupis Murillo RN  Outcome: Ongoing  Goal: Complications related to the disease process, condition or treatment will be avoided or minimized  2/7/2022 2154 by Elvis Mojica RN  Outcome: Ongoing  2/7/2022 1103 by Lupis Murillo RN  Outcome: Ongoing     Problem: Isolation Precautions - Risk of Spread of Infection  Goal: Prevent transmission of infection  2/7/2022 2154 by Elvis Mojica RN  Outcome: Ongoing  2/7/2022 1103 by Lupis Murillo RN  Outcome: Ongoing     Problem: Nutrition Deficits  Goal: Optimize nutritional status  2/7/2022 2154 by Elvis Mojica RN  Outcome: Ongoing  2/7/2022 1103 by Lupis Murillo RN  Outcome: Ongoing     Problem: Risk for Fluid Volume Deficit  Goal: Maintain normal heart rhythm  2/7/2022 2154 by Elvis Mojica RN  Outcome: Ongoing  2/7/2022 1103 by Lupis Murillo RN  Outcome: Ongoing  Goal: Maintain absence of muscle cramping  2/7/2022 2154 by Priya Maria RN  Outcome: Ongoing  2/7/2022 1103 by Isabella Bonds RN  Outcome: Ongoing  Goal: Maintain normal serum potassium, sodium, calcium, phosphorus, and pH  2/7/2022 2154 by Priya Maria RN  Outcome: Ongoing  2/7/2022 1103 by Isabella Bonds RN  Outcome: Ongoing     Problem: Loneliness or Risk for Loneliness  Goal: Demonstrate positive use of time alone when socialization is not possible  2/7/2022 2154 by Priya Maria RN  Outcome: Ongoing  2/7/2022 1103 by Isabella Bonds RN  Outcome: Ongoing     Problem: Fatigue  Goal: Verbalize increase energy and improved vitality  2/7/2022 2154 by Priya Maria RN  Outcome: Ongoing  2/7/2022 1103 by Isabella Bonds RN  Outcome: Ongoing     Problem: Patient Education: Go to Patient Education Activity  Goal: Patient/Family Education  2/7/2022 2154 by Priya Maria RN  Outcome: Ongoing  2/7/2022 1103 by Isabella Bonds RN  Outcome: Ongoing     Problem: Pain:  Goal: Pain level will decrease  Description: Pain level will decrease  2/7/2022 2154 by Priya Maria RN  Outcome: Ongoing  2/7/2022 1103 by Isabella Bonds RN  Outcome: Ongoing  Goal: Control of acute pain  Description: Control of acute pain  2/7/2022 2154 by Priya Maria RN  Outcome: Ongoing  2/7/2022 1103 by Isabella Bonds RN  Outcome: Ongoing  Goal: Control of chronic pain  Description: Control of chronic pain  2/7/2022 2154 by Priya Maria RN  Outcome: Ongoing  2/7/2022 1103 by Isabella Bonds RN  Outcome: Ongoing     Problem: Skin Integrity:  Goal: Will show no infection signs and symptoms  Description: Will show no infection signs and symptoms  2/7/2022 2154 by Priya Maria RN  Outcome: Ongoing  2/7/2022 1103 by Isabella Bonds RN  Outcome: Ongoing  Goal: Absence of new skin breakdown  Description: Absence of new skin breakdown  2/7/2022 2154 by Priya Maria RN  Outcome: Ongoing  2/7/2022 1103 by Loch Sheldrake Sella, RN  Outcome: Ongoing     Problem: Falls - Risk of:  Goal: Will remain free from falls  Description: Will remain free from falls  2/7/2022 2154 by Kaylie Hilario RN  Outcome: Ongoing  2/7/2022 1103 by Aranza Buckley RN  Outcome: Ongoing  Goal: Absence of physical injury  Description: Absence of physical injury  2/7/2022 2154 by Kaylie Hilario RN  Outcome: Ongoing  2/7/2022 1103 by Aranza Buckley RN  Outcome: Ongoing     Problem: Nutrition  Goal: Optimal nutrition therapy  2/7/2022 2154 by Kaylie Hilario RN  Outcome: Ongoing  2/7/2022 1103 by Aranza Buckley RN  Outcome: Ongoing         HgB: 8.2 on 1x h/h redraw. External catheter placed. Wound vac remains in place w/o any drainage since placement. 2-4 O2 desaturations hourly when patient was sleeping. Pt does wear CPAP at home. Placed on 2L NC QHS r/t MARIAMA. Patient requested home medication tizanidine 4mg, but not ordered r/t hypotension.  Pt O2 sats maintained/ less fluctuation while sleeping with 2L NC.

## 2022-02-08 NOTE — PLAN OF CARE
Problem: Airway Clearance - Ineffective  Goal: Achieve or maintain patent airway  2/8/2022 1112 by Talon Hoover RN  Outcome: Ongoing  2/7/2022 2154 by Estefani Costa RN  Outcome: Ongoing     Problem: Gas Exchange - Impaired  Goal: Absence of hypoxia  2/8/2022 1112 by Talon Hoover RN  Outcome: Ongoing  2/7/2022 2154 by Estefani Costa RN  Outcome: Ongoing  Goal: Promote optimal lung function  2/8/2022 1112 by Talon Hoover RN  Outcome: Ongoing  2/7/2022 2154 by Estefani Costa RN  Outcome: Ongoing     Problem: Breathing Pattern - Ineffective  Goal: Ability to achieve and maintain a regular respiratory rate  2/8/2022 1112 by Talon Hoover RN  Outcome: Ongoing  2/7/2022 2154 by Estefani Costa RN  Outcome: Ongoing     Problem:  Body Temperature -  Risk of, Imbalanced  Goal: Ability to maintain a body temperature within defined limits  2/8/2022 1112 by Talon Hoover RN  Outcome: Ongoing  2/7/2022 2154 by Estefani Costa RN  Outcome: Ongoing  Goal: Will regain or maintain usual level of consciousness  2/8/2022 1112 by Talon Hoover RN  Outcome: Ongoing  2/7/2022 2154 by Estefani Costa RN  Outcome: Ongoing  Goal: Complications related to the disease process, condition or treatment will be avoided or minimized  2/8/2022 1112 by Talon Hoover RN  Outcome: Ongoing  2/7/2022 2154 by Estefani Costa RN  Outcome: Ongoing     Problem: Isolation Precautions - Risk of Spread of Infection  Goal: Prevent transmission of infection  2/8/2022 1112 by Talon Hoover RN  Outcome: Ongoing  2/7/2022 2154 by Estefani Costa RN  Outcome: Ongoing     Problem: Nutrition Deficits  Goal: Optimize nutritional status  2/8/2022 1112 by Talon Hoover RN  Outcome: Ongoing  2/7/2022 2154 by Estefani Costa RN  Outcome: Ongoing     Problem: Risk for Fluid Volume Deficit  Goal: Maintain normal heart rhythm  2/8/2022 1112 by Talon Hoover RN  Outcome: Ongoing  2/7/2022 2154 by Estefani Faden, RN  Outcome: Ongoing  Goal: Maintain absence of muscle cramping  2/8/2022 1112 by Bess Yates RN  Outcome: Ongoing  2/7/2022 2154 by Christo Stone RN  Outcome: Ongoing  Goal: Maintain normal serum potassium, sodium, calcium, phosphorus, and pH  2/8/2022 1112 by Bess Yates RN  Outcome: Ongoing  2/7/2022 2154 by Christo Stone RN  Outcome: Ongoing     Problem: Loneliness or Risk for Loneliness  Goal: Demonstrate positive use of time alone when socialization is not possible  2/8/2022 1112 by Bess Yates RN  Outcome: Ongoing  2/7/2022 2154 by Christo Stone RN  Outcome: Ongoing     Problem: Fatigue  Goal: Verbalize increase energy and improved vitality  2/8/2022 1112 by Bess Yates RN  Outcome: Ongoing  2/7/2022 2154 by Christo Stone RN  Outcome: Ongoing     Problem: Patient Education: Go to Patient Education Activity  Goal: Patient/Family Education  2/8/2022 1112 by Bess Yates RN  Outcome: Ongoing  2/7/2022 2154 by Christo Stone RN  Outcome: Ongoing     Problem: Pain:  Goal: Pain level will decrease  Description: Pain level will decrease  2/8/2022 1112 by Bess Yates RN  Outcome: Ongoing  2/7/2022 2154 by Christo Stone RN  Outcome: Ongoing  Goal: Control of acute pain  Description: Control of acute pain  2/8/2022 1112 by Bess Yates RN  Outcome: Ongoing  2/7/2022 2154 by Christo Stone RN  Outcome: Ongoing  Goal: Control of chronic pain  Description: Control of chronic pain  2/8/2022 1112 by Bess Yates RN  Outcome: Ongoing  2/7/2022 2154 by Christo Stone RN  Outcome: Ongoing     Problem: Skin Integrity:  Goal: Will show no infection signs and symptoms  Description: Will show no infection signs and symptoms  2/8/2022 1112 by Bess Yates RN  Outcome: Ongoing  2/7/2022 2154 by Christo Stone RN  Outcome: Ongoing  Goal: Absence of new skin breakdown  Description: Absence of new skin breakdown  2/8/2022 1112 by Bess Yates RN  Outcome: Ongoing  2/7/2022 2154 by Christo Stone RN  Outcome: Ongoing     Problem: Falls - Risk of:  Goal: Will remain free from falls  Description: Will remain free from falls  2/8/2022 1112 by Chuckie Myles RN  Outcome: Ongoing  2/7/2022 2154 by Rachel Yadav RN  Outcome: Ongoing  Goal: Absence of physical injury  Description: Absence of physical injury  2/8/2022 1112 by Chuckie Myles RN  Outcome: Ongoing  2/7/2022 2154 by Rachel Yadav RN  Outcome: Ongoing     Problem: Nutrition  Goal: Optimal nutrition therapy  2/8/2022 1112 by Chuckie Myles RN  Outcome: Ongoing  2/7/2022 2154 by Rachel Yadav RN  Outcome: Ongoing

## 2022-02-08 NOTE — CARE COORDINATION
Transitional planning-called [facility]- no answer. Called [facility]-left message for James Bustamante to call back. 1255 call from [address] [facility] accepted-waiting on precert.

## 2022-02-09 PROCEDURE — 2700000000 HC OXYGEN THERAPY PER DAY

## 2022-02-09 PROCEDURE — 6370000000 HC RX 637 (ALT 250 FOR IP): Performed by: STUDENT IN AN ORGANIZED HEALTH CARE EDUCATION/TRAINING PROGRAM

## 2022-02-09 PROCEDURE — 6370000000 HC RX 637 (ALT 250 FOR IP): Performed by: HEALTH CARE PROVIDER

## 2022-02-09 PROCEDURE — 1200000000 HC SEMI PRIVATE

## 2022-02-09 PROCEDURE — 6360000002 HC RX W HCPCS: Performed by: HEALTH CARE PROVIDER

## 2022-02-09 PROCEDURE — 6370000000 HC RX 637 (ALT 250 FOR IP): Performed by: NURSE PRACTITIONER

## 2022-02-09 PROCEDURE — 94761 N-INVAS EAR/PLS OXIMETRY MLT: CPT

## 2022-02-09 PROCEDURE — 2580000003 HC RX 258: Performed by: STUDENT IN AN ORGANIZED HEALTH CARE EDUCATION/TRAINING PROGRAM

## 2022-02-09 RX ORDER — ACETAMINOPHEN 500 MG
1000 TABLET ORAL EVERY 8 HOURS PRN
Status: DISCONTINUED | OUTPATIENT
Start: 2022-02-09 | End: 2022-02-12 | Stop reason: HOSPADM

## 2022-02-09 RX ORDER — SENNA PLUS 8.6 MG/1
1 TABLET ORAL NIGHTLY PRN
Status: DISCONTINUED | OUTPATIENT
Start: 2022-02-09 | End: 2022-02-12 | Stop reason: HOSPADM

## 2022-02-09 RX ORDER — ONDANSETRON 4 MG/1
4 TABLET, ORALLY DISINTEGRATING ORAL EVERY 6 HOURS PRN
Status: DISCONTINUED | OUTPATIENT
Start: 2022-02-09 | End: 2022-02-12 | Stop reason: HOSPADM

## 2022-02-09 RX ORDER — TIZANIDINE 4 MG/1
4 TABLET ORAL ONCE
Status: COMPLETED | OUTPATIENT
Start: 2022-02-09 | End: 2022-02-09

## 2022-02-09 RX ORDER — POLYETHYLENE GLYCOL 3350 17 G/17G
17 POWDER, FOR SOLUTION ORAL DAILY PRN
Status: DISCONTINUED | OUTPATIENT
Start: 2022-02-09 | End: 2022-02-12 | Stop reason: HOSPADM

## 2022-02-09 RX ORDER — CHOLECALCIFEROL (VITAMIN D3) 125 MCG
5 CAPSULE ORAL NIGHTLY PRN
Status: DISCONTINUED | OUTPATIENT
Start: 2022-02-08 | End: 2022-02-09

## 2022-02-09 RX ORDER — ONDANSETRON 2 MG/ML
2 INJECTION INTRAMUSCULAR; INTRAVENOUS ONCE
Status: COMPLETED | OUTPATIENT
Start: 2022-02-09 | End: 2022-02-09

## 2022-02-09 RX ORDER — TIZANIDINE 4 MG/1
4 TABLET ORAL NIGHTLY PRN
Status: DISCONTINUED | OUTPATIENT
Start: 2022-02-09 | End: 2022-02-12 | Stop reason: HOSPADM

## 2022-02-09 RX ORDER — PROMETHAZINE HYDROCHLORIDE 25 MG/ML
6.25 INJECTION, SOLUTION INTRAMUSCULAR; INTRAVENOUS ONCE
Status: DISCONTINUED | OUTPATIENT
Start: 2022-02-09 | End: 2022-02-12 | Stop reason: HOSPADM

## 2022-02-09 RX ORDER — CHOLECALCIFEROL (VITAMIN D3) 125 MCG
5 CAPSULE ORAL NIGHTLY
Status: DISCONTINUED | OUTPATIENT
Start: 2022-02-10 | End: 2022-02-12 | Stop reason: HOSPADM

## 2022-02-09 RX ADMIN — PANCRELIPASE 24000 UNITS: 24000; 76000; 120000 CAPSULE, DELAYED RELEASE PELLETS ORAL at 12:50

## 2022-02-09 RX ADMIN — TIZANIDINE 2 MG: 4 TABLET ORAL at 21:33

## 2022-02-09 RX ADMIN — MIDODRINE HYDROCHLORIDE 5 MG: 5 TABLET ORAL at 18:09

## 2022-02-09 RX ADMIN — ROPINIROLE HYDROCHLORIDE 0.25 MG: 0.25 TABLET, FILM COATED ORAL at 21:07

## 2022-02-09 RX ADMIN — CEPHALEXIN 500 MG: 500 CAPSULE ORAL at 18:09

## 2022-02-09 RX ADMIN — PANTOPRAZOLE SODIUM 40 MG: 40 TABLET, DELAYED RELEASE ORAL at 05:48

## 2022-02-09 RX ADMIN — SODIUM CHLORIDE, PRESERVATIVE FREE 10 ML: 5 INJECTION INTRAVENOUS at 21:14

## 2022-02-09 RX ADMIN — CARBOXYMETHYLCELLULOSE SODIUM 1 DROP: 10 GEL OPHTHALMIC at 21:32

## 2022-02-09 RX ADMIN — SODIUM CHLORIDE, PRESERVATIVE FREE 10 ML: 5 INJECTION INTRAVENOUS at 15:45

## 2022-02-09 RX ADMIN — MIDODRINE HYDROCHLORIDE 5 MG: 5 TABLET ORAL at 08:58

## 2022-02-09 RX ADMIN — ROPINIROLE HYDROCHLORIDE 0.25 MG: 0.25 TABLET, FILM COATED ORAL at 08:59

## 2022-02-09 RX ADMIN — BUDESONIDE AND FORMOTEROL FUMARATE DIHYDRATE 2 PUFF: 160; 4.5 AEROSOL RESPIRATORY (INHALATION) at 09:01

## 2022-02-09 RX ADMIN — ONDANSETRON 2 MG: 2 INJECTION INTRAMUSCULAR; INTRAVENOUS at 15:44

## 2022-02-09 RX ADMIN — Medication 5 MG: at 03:27

## 2022-02-09 RX ADMIN — MODAFINIL 100 MG: 100 TABLET ORAL at 13:57

## 2022-02-09 RX ADMIN — METHOCARBAMOL TABLETS 750 MG: 750 TABLET, COATED ORAL at 00:43

## 2022-02-09 RX ADMIN — MIDODRINE HYDROCHLORIDE 5 MG: 5 TABLET ORAL at 01:52

## 2022-02-09 RX ADMIN — METOPROLOL TARTRATE 12.5 MG: 25 TABLET ORAL at 08:59

## 2022-02-09 RX ADMIN — CEPHALEXIN 500 MG: 500 CAPSULE ORAL at 22:30

## 2022-02-09 RX ADMIN — CARBOXYMETHYLCELLULOSE SODIUM 1 DROP: 10 GEL OPHTHALMIC at 00:46

## 2022-02-09 RX ADMIN — METHOCARBAMOL TABLETS 750 MG: 750 TABLET, COATED ORAL at 05:48

## 2022-02-09 RX ADMIN — GABAPENTIN 300 MG: 300 CAPSULE ORAL at 05:48

## 2022-02-09 RX ADMIN — CEPHALEXIN 500 MG: 500 CAPSULE ORAL at 12:50

## 2022-02-09 RX ADMIN — PANCRELIPASE 24000 UNITS: 24000; 76000; 120000 CAPSULE, DELAYED RELEASE PELLETS ORAL at 18:09

## 2022-02-09 RX ADMIN — RIVAROXABAN 20 MG: 20 TABLET, FILM COATED ORAL at 18:09

## 2022-02-09 RX ADMIN — CEPHALEXIN 500 MG: 500 CAPSULE ORAL at 08:58

## 2022-02-09 RX ADMIN — ONDANSETRON 4 MG: 4 TABLET, ORALLY DISINTEGRATING ORAL at 14:02

## 2022-02-09 RX ADMIN — MINERAL OIL AND WHITE PETROLATUM: 150; 830 OINTMENT OPHTHALMIC at 12:50

## 2022-02-09 RX ADMIN — TAMSULOSIN HYDROCHLORIDE 0.4 MG: 0.4 CAPSULE ORAL at 08:58

## 2022-02-09 RX ADMIN — TIZANIDINE 4 MG: 4 TABLET ORAL at 15:49

## 2022-02-09 RX ADMIN — BUDESONIDE AND FORMOTEROL FUMARATE DIHYDRATE 2 PUFF: 160; 4.5 AEROSOL RESPIRATORY (INHALATION) at 21:00

## 2022-02-09 RX ADMIN — ACETAMINOPHEN 1000 MG: 325 TABLET ORAL at 05:48

## 2022-02-09 RX ADMIN — SODIUM CHLORIDE, PRESERVATIVE FREE 10 ML: 5 INJECTION INTRAVENOUS at 08:58

## 2022-02-09 RX ADMIN — PANCRELIPASE 24000 UNITS: 24000; 76000; 120000 CAPSULE, DELAYED RELEASE PELLETS ORAL at 08:58

## 2022-02-09 ASSESSMENT — PAIN DESCRIPTION - LOCATION
LOCATION: KNEE
LOCATION: KNEE

## 2022-02-09 ASSESSMENT — PAIN SCALES - GENERAL
PAINLEVEL_OUTOF10: 7
PAINLEVEL_OUTOF10: 7
PAINLEVEL_OUTOF10: 6
PAINLEVEL_OUTOF10: 7
PAINLEVEL_OUTOF10: 6

## 2022-02-09 ASSESSMENT — PAIN DESCRIPTION - PAIN TYPE
TYPE: ACUTE PAIN
TYPE: ACUTE PAIN

## 2022-02-09 ASSESSMENT — PAIN DESCRIPTION - ORIENTATION
ORIENTATION: LEFT
ORIENTATION: LEFT

## 2022-02-09 ASSESSMENT — PAIN DESCRIPTION - FREQUENCY: FREQUENCY: CONTINUOUS

## 2022-02-09 NOTE — CARE COORDINATION
Discharge will be held d/t vomiting and diarrhea. Call to [address] [place] [address] and spoke to Rima Craig. Transportation cancelled. Voicemail left for Coco House at "[employer] to notify.  Call to "[employer] and spoke to  who would notify nurse

## 2022-02-09 NOTE — PROGRESS NOTES
Orthopedic Progress Note    Patient:  Renate Light  YOB: 1945     77 y.o. male    Subjective:  Patient seen and examined. No complaints, concerns or issues overnight. Pain controlled. Denies fever, CP. Has some SOB, which is at baseline for him  Ambulated 1 foot with PT, required max assist for bed transfer    Vitals reviewed, afebrile    Objective:   Vitals:    02/09/22 0352   BP:    Pulse:    Resp: 12   Temp:    SpO2:      General: NAD, cooperative   Cardiovascular: Regular rate   Respiratory: Chest symmetric, no accessory muscle use, normal respirations, no audible wheezes  Musculoskeletal  Left lower extremity: Prevena on. Maintaining suction. Scant output in cannister. Additional dressings just distal to prevena are clean, dry and intact. EHL/FHL/TA/GS complex motor intact. Sural, saphenous, superificial/deep peroneal, and plantar nerve distribution SILT. Dorsalis pedis pulse 2+ with BCR. Recent Labs     02/08/22  0503   WBC 6.2   HGB 7.9*   HCT 24.3*         Meds:  Xarelto, keflex. See rec for complete list    Impression 77 y.o. male with an interprosthetic femur fx s/p IMN, DOS 1/30/22    Plan  - OK to remove prevena and replace with 10 inch optifoam bandage if prevena loses suction  - WBAT LLE  - Continue keflex 500 mg QID (end date: 2/16/22)  - OK to DC from ortho perspective, f/u with Dr. Martní Mehta in 4 weeks.     Electronically signed by Mykel Harry MD at 5:22 AM 02/09/22

## 2022-02-09 NOTE — PROGRESS NOTES
Writer spoke with Melissa Yeison (pt's wife) and provided an update about the pt's plan of care and how he was doing. Writer also explained that tomorrow the Dr will reassess the patient to see if he is feeling better for discharge.  Melissa Latham denies any further comments or concerns at this time

## 2022-02-09 NOTE — PLAN OF CARE
Problem: Airway Clearance - Ineffective  Goal: Achieve or maintain patent airway  2/8/2022 2301 by Jonny Abbott RN  Outcome: Ongoing  2/8/2022 1112 by Estephanie Griffin RN  Outcome: Ongoing     Problem: Gas Exchange - Impaired  Goal: Absence of hypoxia  2/8/2022 2301 by Jonny Abbott RN  Outcome: Ongoing  2/8/2022 1112 by Estephanie Griffin RN  Outcome: Ongoing  Goal: Promote optimal lung function  2/8/2022 2301 by Jonny Abbott RN  Outcome: Ongoing  2/8/2022 1112 by Estephanie Griffin RN  Outcome: Ongoing     Problem: Breathing Pattern - Ineffective  Goal: Ability to achieve and maintain a regular respiratory rate  2/8/2022 2301 by Jonny Abbott RN  Outcome: Ongoing  2/8/2022 1112 by Estephanie Griffin RN  Outcome: Ongoing     Problem:  Body Temperature -  Risk of, Imbalanced  Goal: Ability to maintain a body temperature within defined limits  2/8/2022 2301 by Jonny Abbott RN  Outcome: Ongoing  2/8/2022 1112 by Estephanie Griffin RN  Outcome: Ongoing  Goal: Will regain or maintain usual level of consciousness  2/8/2022 2301 by Jonny Abbott RN  Outcome: Ongoing  2/8/2022 1112 by Estephanie Griffin RN  Outcome: Ongoing  Goal: Complications related to the disease process, condition or treatment will be avoided or minimized  2/8/2022 2301 by Jonny Abbott RN  Outcome: Ongoing  2/8/2022 1112 by Estephanie Griffin RN  Outcome: Ongoing     Problem: Isolation Precautions - Risk of Spread of Infection  Goal: Prevent transmission of infection  2/8/2022 2301 by Jonny Abbott RN  Outcome: Ongoing  2/8/2022 1112 by Estephanie Griffin RN  Outcome: Ongoing     Problem: Nutrition Deficits  Goal: Optimize nutritional status  2/8/2022 2301 by Jonny Abbott RN  Outcome: Ongoing  2/8/2022 1112 by Estephanie Griffin RN  Outcome: Ongoing     Problem: Risk for Fluid Volume Deficit  Goal: Maintain normal heart rhythm  2/8/2022 2301 by Jonny Abbott RN  Outcome: Ongoing  2/8/2022 1112 by Estephanie Griffin RN  Outcome: Ongoing  Goal: Maintain absence of muscle cramping  2/8/2022 2301 by Brian Correa RN  Outcome: Ongoing  2/8/2022 1112 by Michelle Goodman RN  Outcome: Ongoing  Goal: Maintain normal serum potassium, sodium, calcium, phosphorus, and pH  2/8/2022 2301 by Brian Correa RN  Outcome: Ongoing  2/8/2022 1112 by Michelle Goodman RN  Outcome: Ongoing     Problem: Loneliness or Risk for Loneliness  Goal: Demonstrate positive use of time alone when socialization is not possible  2/8/2022 2301 by Brian Correa RN  Outcome: Ongoing  2/8/2022 1112 by Michelle Goodman RN  Outcome: Ongoing     Problem: Fatigue  Goal: Verbalize increase energy and improved vitality  2/8/2022 2301 by Brian Correa RN  Outcome: Ongoing  2/8/2022 1112 by Michelle Goodman RN  Outcome: Ongoing     Problem: Patient Education: Go to Patient Education Activity  Goal: Patient/Family Education  2/8/2022 2301 by Brian Correa RN  Outcome: Ongoing  2/8/2022 1112 by Michelle Goodman RN  Outcome: Ongoing     Problem: Pain:  Goal: Pain level will decrease  Description: Pain level will decrease  2/8/2022 2301 by Brian Correa RN  Outcome: Ongoing  2/8/2022 1112 by Michelle Goodman RN  Outcome: Ongoing  Goal: Control of acute pain  Description: Control of acute pain  2/8/2022 2301 by Brian Correa RN  Outcome: Ongoing  2/8/2022 1112 by Michelle Goodman RN  Outcome: Ongoing  Goal: Control of chronic pain  Description: Control of chronic pain  2/8/2022 2301 by Brian Correa RN  Outcome: Ongoing  2/8/2022 1112 by Michelle Goodman RN  Outcome: Ongoing     Problem: Skin Integrity:  Goal: Will show no infection signs and symptoms  Description: Will show no infection signs and symptoms  2/8/2022 2301 by Brian Correa RN  Outcome: Ongoing  2/8/2022 1112 by Michelle Goodman RN  Outcome: Ongoing  Goal: Absence of new skin breakdown  Description: Absence of new skin breakdown  2/8/2022 2301 by Brian Correa RN  Outcome: Ongoing  2/8/2022 1112 by Michelle Rex, RN  Outcome: Ongoing     Problem: Falls - Risk of:  Goal: Will remain free from falls  Description: Will remain free from falls  2/8/2022 2301 by Jonny Abbott RN  Outcome: Ongoing  2/8/2022 1112 by Estephanie Griffin RN  Outcome: Ongoing  Goal: Absence of physical injury  Description: Absence of physical injury  2/8/2022 2301 by Jonny Abbott RN  Outcome: Ongoing  2/8/2022 1112 by Estephanie Griffin RN  Outcome: Ongoing     Problem: Nutrition  Goal: Optimal nutrition therapy  2/8/2022 2301 by Jonny Abbott RN  Outcome: Ongoing  2/8/2022 1112 by Estephanie Griffin RN  Outcome: Ongoing          Patient in chair, soiled, at beginning of shift. Had 3 more soft BMs. 1x dose melatonin administered. LLE ace wrap reapplied. No episodes of hypotension overnight. May need to hold senna/ glycol tomorrow. Patient placed on CPAP; tolerated until 0300. Then placed on 2L NC with SpO2 sats maintaining.

## 2022-02-09 NOTE — CARE COORDINATION
Call to [facility] in admissions to verify status of precert, ML on AM for call back along with CB number. [phone] Return call from [facility] at [facility] ([phone]), I called her back and left message that I would try again in 10 min. Salvador U. 2. with [facility] at [facility] they can accept patient. Will need 7000 complete. [facility] is requesting updated notes and med list be faxed to [phone] Notes faxed as requested to advanced Lila Faith 61 called and left message with Roma Howard at [address] wanting him to go there. Referral faxed to [facility] when called. Referral faxed to [facility] and left message with [facility]. [phone] call from [facility] referral faxed to [phone]. Hand faxed referral to [facility] Met with pt who relates he does not want to go to [facility], he relates he wanted to go to [facility], I called [address] and spoke with Rudy Turner in admissions they cannot take the patient because he has not had his vaccines and they do not have a private room available, I met with the patient and explained the above.     [phone] with pts daughter informed her precert has been obtained and that I tried to contact [address] again and they cannot take the pt, she is relating that the facility said he can be on a wait list, informed her if a bed opens up she can request a transfer to that facility when avail but we cannot delay discharge waiting on facilities to open when we have acceptance and precert, she verbalized understanding. [facility], request to RN to complete [address] Pts daughter notified of transport time, awaiting ANDRESSA completion. [facility] notified of transport time.      Lila Kee 135 faxed to [facility]

## 2022-02-09 NOTE — PROGRESS NOTES
Dr. Siobhan Wade informed of patients continued vomiting and loose stools unrelieved with Zofran. Patient does not feel well enough for discharge.    Electronically signed by Magalys Kiser RN on 2/9/2022

## 2022-02-09 NOTE — PROGRESS NOTES
Comprehensive Nutrition Assessment    Type and Reason for Visit:  Reassess    Nutrition Recommendations/Plan: Continue current diet. Will modify oral supplements to provide Ensure Clear liquid supplements in apple. Encourage/monitor PO intakes as tolerated. Will monitor labs, weights, and plan of care. Nutrition Assessment:  Pt reports he does not have much of an appetite. Reports a h/o gastric bypass surgery in 1985 - reports one of the bands is broken so food gets stuck sometimes. Pt states he sometimes has nausea and vomiting. For breakfast today pt had scrambled eggs and hash browns. Noted recorded PO intakes of % yesterday. Pt reports he does not like the Ensure Enlive supplements - states they are heavy and he is not able to drink much of them. Pt willing to try Ensure Clear Liquid supplements in apple. Pt also would like some pudding with his lunch tray - called [facility] to add to meal tray. Weight fluctuations since admission noted. Last BM 2/9. Labs/Meds reviewed. Malnutrition Assessment:  Malnutrition Status: Moderate malnutrition    Context:  Chronic Illness     Findings of the 6 clinical characteristics of malnutrition:  Energy Intake: Mild decrease in energy intake - Pt reports decreased appetite; h/o gastric bypass surgery. Weight Loss:  7 - Greater than 7.5% over 3 months - Weight fluctuations since admission noted. Body Fat Loss:   (Mild-Moderate body fat loss) Orbital,Fat Overlying Ribs   Muscle Mass Loss:   (Moderate muscle mass loss) Clavicles (pectoralis & deltoids),Temples (temporalis)  Fluid Accumulation:  1 - Mild Extremities   Strength:  Not Performed    Estimated Daily Nutrient Needs:  Energy (kcal):  20-22 kcal/kg = 8365-7444 kcals/day; Weight Used for Energy Requirements:  Current     Protein (g):  1.2-1.5 gm/kg =  gm pro/day; Weight Used for Protein Requirements:  Ideal        Fluid (ml/day):  1900ml/d (25ml/kg);  Method Used for Fluid Requirements:  ml/Kg      Nutrition Related Findings:  Labs reviewed. Meds: Colace, Creon, Protonix. Last BM 2/9. H/o gastric surgery. Wounds:  Pressure Injury,Stage II (to coccyx; multiple surgical incisions; wound vac to left knee)       Current Nutrition Therapies:    ADULT DIET; Easy to Chew  ADULT ORAL NUTRITION SUPPLEMENT; Lunch; Snack; coffee for headache  ADULT ORAL NUTRITION SUPPLEMENT; Breakfast, Lunch, Dinner; Clear Liquid Oral Supplement    Anthropometric Measures:  · Height: 5' 10\" (177.8 cm)  · Current Body Weight: 201 lb 11.2 oz (91.5 kg)   · Admission Body Weight: 170 lb 13.7 oz (77.5 kg)    · Usual Body Weight: 193 lb 9.6 oz (87.8 kg) (11/10/21 bed scale per chart review)     · Ideal Body Weight: 166 lbs; % Ideal Body Weight 121.5 %   · BMI: 28.9  · BMI Categories: Overweight (BMI 25.0-29. 9)       Nutrition Diagnosis:   · Increased nutrient needs related to increase demand for energy/nutrients as evidenced by wounds    · Inadequate oral intake related to  (decreased appetite; h/o gastric bypass surgery) as evidenced by intake 0-25%,intake 26-50%,intake 51-75% (variable PO intakes; need for ONS)    Nutrition Interventions:   Food and/or Nutrient Delivery:  Continue Current Diet,Modify Oral Nutrition Supplement (Switch to Ensure Clear Liquid oral supplements in apple.)  Nutrition Education/Counseling:  No recommendation at this time   Coordination of Nutrition Care:  Continue to monitor while inpatient    Goals:  Meet 50% or greater of estimated nutrition needs       Nutrition Monitoring and Evaluation:   Behavioral-Environmental Outcomes:  None Identified   Food/Nutrient Intake Outcomes:  Food and Nutrient Intake,Supplement Intake  Physical Signs/Symptoms Outcomes:  Biochemical Data,GI Status,Fluid Status or Edema,Hemodynamic Status,Nutrition Focused Physical Findings,Skin,Weight     Discharge Planning:     Too soon to determine     Electronically signed by Ashley Moreno RD, LD on 2/9/22 at 12:29 PM EST    Contact: [phone]

## 2022-02-09 NOTE — PROGRESS NOTES
PROGRESS NOTE      PATIENT NAME: Diony Schultz RECORD NO. [id number]  DATE: 2/9/2022  PRIMARY CARE PHYSICIAN: Ashley Heaton, JULIAN - CNP    HD: # 11    ASSESSMENT    Patient Active Problem List   Diagnosis    Chronic atrial fibrillation (HCC)    Dyslipidemia    Gastroesophageal reflux disease without esophagitis    Osteoarthritis of lumbar spine    OA (osteoarthritis) of knee, bilateral    Foot drop, right    Hallux valgus, acquired, bilateral    Hearing impairment    Essential hypertension    Slow transit constipation    MARIAMA on CPAP    COPD (chronic obstructive pulmonary disease) (HCC)    Chronic diastolic heart failure (HCC)    History of bariatric surgery including banding leading to esophageal stricture and aspiration    BPH (benign prostatic hyperplasia)    Myalgia    Atrial fibrillation with slow ventricular response (HCC)    Pacemaker pocket hematoma, initial encounter    Pulmonary hypertension ([facility] [address]    Presence of permanent cardiac pacemaker    Fall at home, initial encounter    Left displaced femoral neck fracture ([facility] [address]    Retained orthopedic hardware    COVID-19    Elevated C-reactive protein (CRP)       MEDICAL DECISION MAKING AND PLAN    Neuro -MMPT - tylenol, gabapentin, robaxin, home requip    CV - chronic afib, rate controlled. BP remains systolic 66-41L, asymptomatic, the last few days. Metoprolol already decreased from 50 to 25mg BID, several held doses this week   Will decrease to 12.5mg bid    Restarted home midodrene 2/8, increased to BID    Heme: Hgb stable on home xarelto. S/p 1 unit prbc, EOD iron supplement. Every other day cbc. Pulm - encourage incentive spirometry, cont COPD meds. Desat to 80s overnight, restart home CPAP, if desat again while awake/on cpap will complete CXR    Renal - voiding spontaneously. Limited I/O charting. EOD BMP, encourage po intake. Cont home flomax. GI - puree diet (prior gastric surg), creon, supplement.   Bowel regimen decreased today due to multiple bm overnight    ID -   Infectious disease on board for covid, mildly symptomatic on presentation. Received Sotrivimab . Out iso . Placed on Keflex by ortho on . Recommend 14 day course due to continued drainage in the left knee after surgery and history of left TKA. Nystatin for oral thrush, 14 day complete     Endo - no acute issues    MSK - status post left femur CMN, weightbearing as tolerated left lower extremity, Encourage PT/OT. Would like to change from robaxin to tizanidine at night as that works better for him at home. Dispo - pending rehab placement    Chief Complaint: [facility] with right hip IT fracture s/p right femoral HWR and CMN insertion    SUBJECTIVE    Belinda Fam was seen and examined at bedside this morning. \"I didn't know I could poop that much! \". Swelling in knee improving. No drainage from prevena. OBJECTIVE  VITALS: Temp: Temp: 99.5 °F (37.5 °C)Temp  Av °F (37.2 °C)  Min: 97.6 °F (36.4 °C)  Max: 99.6 °F (56.8 °C) BP Systolic (12RIU), KLW:727 , Min:83 , XYA:999   Diastolic (85CNA), VYJ:11, Min:40, Max:73   Pulse Pulse  Av.9  Min: 70  Max: 88 Resp Resp  Avg: 15.4  Min: 12  Max: 20 Pulse ox SpO2  Av.3 %  Min: 94 %  Max: 100 %  CONSTITUTIONAL: awake, alert, cooperative, no apparent distress  ENT: ears are symmetric, nares patent   HEENT: moist mucous membranes  NECK: Supple, symmetrical, trachea midline  LUNGS: no respiratory distress, no audible wheezing, no chest wall tenderness, AICD palpable in left chest  CARDIOVASCULAR: Regular rate and rhythm  ABDOMEN: soft, nontender, nondistended, no guarding, no rebound tenderness   MUSCULOSKELETAL: LLE: Prevena on, with sponge suctioned down and no pooling of blood under sponge. Diallo Minor is c/d/i. No sign of strike through bleeding. Dressings at the hip c/d/i. Appropriate tenderness to palpation about the hip and knee.   NEUROLOGIC: Awake, alert, oriented to name, place and time  EXTREMITIES: peripheral pulses normal, + pedal edema left  SKIN: normal coloration and turgor other than resolving bruising on the left knee    I/O last 3 completed shifts: In: 1960 [P.O.:1960]  Out: 2000 [Urine:2000]    Drain/tube output: In: 1660 [P.O.:1660]  Out: 1200 [Urine:1200]    LAB:  CBC:   Recent Labs     02/07/22  1934 02/08/22  0503   WBC  --  6.2   HGB 8.2* 7.9*   HCT 25.0* 24.3*   MCV  --  98.4   PLT  --  214     BMP:   No results for input(s): NA, K, CL, CO2, BUN, CREATININE, GLUCOSE in the last 72 hours. COAGS: No results for input(s): APTT, PROT, INR in the last 72 hours. RADIOLOGY:  No results found.       Electronically signed by Elio Srinivasan MD on 2/9/2022 at 7:52 AM

## 2022-02-09 NOTE — PLAN OF CARE
Problem: Breathing Pattern - Ineffective  Goal: Ability to achieve and maintain a regular respiratory rate  2/9/2022 1128 by Pallavi Simms RN  Outcome: Ongoing     Problem: Isolation Precautions - Risk of Spread of Infection  Goal: Prevent transmission of infection  2/9/2022 1128 by Pallavi Simms RN  Outcome: Ongoing     Problem: Nutrition Deficits  Goal: Optimize nutritional status  2/9/2022 1128 by Pallavi Simms RN  Outcome: Ongoing  2/8/2022 2301 by Leslee Romero RN  Outcome: Ongoing     Will continue to monitor patient

## 2022-02-09 NOTE — PROGRESS NOTES
Discharge on hold per primary.    Electronically signed by Jessica Becerra RN on 2/9/2022 at 5:43 PM

## 2022-02-10 LAB
ABSOLUTE EOS #: 0.44 K/UL (ref 0–0.4)
ABSOLUTE IMMATURE GRANULOCYTE: 0 K/UL (ref 0–0.3)
ABSOLUTE LYMPH #: 0.5 K/UL (ref 1–4.8)
ABSOLUTE MONO #: 0.28 K/UL (ref 0.1–0.8)
ANION GAP SERPL CALCULATED.3IONS-SCNC: 10 MMOL/L (ref 9–17)
ANION GAP SERPL CALCULATED.3IONS-SCNC: 9 MMOL/L (ref 9–17)
BASOPHILS # BLD: 0 % (ref 0–2)
BASOPHILS ABSOLUTE: 0 K/UL (ref 0–0.2)
BUN BLDV-MCNC: 13 MG/DL (ref 8–23)
BUN BLDV-MCNC: 14 MG/DL (ref 8–23)
BUN/CREAT BLD: ABNORMAL (ref 9–20)
BUN/CREAT BLD: ABNORMAL (ref 9–20)
CALCIUM SERPL-MCNC: 8.3 MG/DL (ref 8.6–10.4)
CALCIUM SERPL-MCNC: 8.4 MG/DL (ref 8.6–10.4)
CHLORIDE BLD-SCNC: 86 MMOL/L (ref 98–107)
CHLORIDE BLD-SCNC: 89 MMOL/L (ref 98–107)
CO2: 24 MMOL/L (ref 20–31)
CO2: 27 MMOL/L (ref 20–31)
CREAT SERPL-MCNC: 0.5 MG/DL (ref 0.7–1.2)
CREAT SERPL-MCNC: 0.51 MG/DL (ref 0.7–1.2)
DIFFERENTIAL TYPE: ABNORMAL
EOSINOPHILS RELATIVE PERCENT: 8 % (ref 1–4)
GFR AFRICAN AMERICAN: >60 ML/MIN
GFR AFRICAN AMERICAN: >60 ML/MIN
GFR NON-AFRICAN AMERICAN: >60 ML/MIN
GFR NON-AFRICAN AMERICAN: >60 ML/MIN
GFR SERPL CREATININE-BSD FRML MDRD: ABNORMAL ML/MIN/{1.73_M2}
GLUCOSE BLD-MCNC: 104 MG/DL (ref 70–99)
GLUCOSE BLD-MCNC: 105 MG/DL (ref 75–110)
GLUCOSE BLD-MCNC: 95 MG/DL (ref 70–99)
HCT VFR BLD CALC: 22.7 % (ref 40.7–50.3)
HEMOGLOBIN: 7.1 G/DL (ref 13–17)
IMMATURE GRANULOCYTES: 0 %
LYMPHOCYTES # BLD: 9 % (ref 24–44)
MAGNESIUM: 2.1 MG/DL (ref 1.6–2.6)
MCH RBC QN AUTO: 31.6 PG (ref 25.2–33.5)
MCHC RBC AUTO-ENTMCNC: 31.3 G/DL (ref 28.4–34.8)
MCV RBC AUTO: 100.9 FL (ref 82.6–102.9)
MONOCYTES # BLD: 5 % (ref 1–7)
MORPHOLOGY: ABNORMAL
NRBC AUTOMATED: 0 PER 100 WBC
PDW BLD-RTO: 16.7 % (ref 11.8–14.4)
PHOSPHORUS: 2.9 MG/DL (ref 2.5–4.5)
PLATELET # BLD: 204 K/UL (ref 138–453)
PLATELET ESTIMATE: ABNORMAL
PMV BLD AUTO: 9.2 FL (ref 8.1–13.5)
POTASSIUM SERPL-SCNC: 3.3 MMOL/L (ref 3.7–5.3)
POTASSIUM SERPL-SCNC: 3.4 MMOL/L (ref 3.7–5.3)
RBC # BLD: 2.25 M/UL (ref 4.21–5.77)
RBC # BLD: ABNORMAL 10*6/UL
SEG NEUTROPHILS: 78 % (ref 36–66)
SEGMENTED NEUTROPHILS ABSOLUTE COUNT: 4.28 K/UL (ref 1.8–7.7)
SODIUM BLD-SCNC: 122 MMOL/L (ref 135–144)
SODIUM BLD-SCNC: 123 MMOL/L (ref 135–144)
WBC # BLD: 5.5 K/UL (ref 3.5–11.3)
WBC # BLD: ABNORMAL 10*3/UL

## 2022-02-10 PROCEDURE — 6370000000 HC RX 637 (ALT 250 FOR IP): Performed by: STUDENT IN AN ORGANIZED HEALTH CARE EDUCATION/TRAINING PROGRAM

## 2022-02-10 PROCEDURE — 36415 COLL VENOUS BLD VENIPUNCTURE: CPT

## 2022-02-10 PROCEDURE — 6370000000 HC RX 637 (ALT 250 FOR IP): Performed by: HEALTH CARE PROVIDER

## 2022-02-10 PROCEDURE — 83735 ASSAY OF MAGNESIUM: CPT

## 2022-02-10 PROCEDURE — 94640 AIRWAY INHALATION TREATMENT: CPT

## 2022-02-10 PROCEDURE — 6370000000 HC RX 637 (ALT 250 FOR IP): Performed by: NURSE PRACTITIONER

## 2022-02-10 PROCEDURE — 82947 ASSAY GLUCOSE BLOOD QUANT: CPT

## 2022-02-10 PROCEDURE — 94761 N-INVAS EAR/PLS OXIMETRY MLT: CPT

## 2022-02-10 PROCEDURE — 1200000000 HC SEMI PRIVATE

## 2022-02-10 PROCEDURE — 85025 COMPLETE CBC W/AUTO DIFF WBC: CPT

## 2022-02-10 PROCEDURE — 80048 BASIC METABOLIC PNL TOTAL CA: CPT

## 2022-02-10 PROCEDURE — 2700000000 HC OXYGEN THERAPY PER DAY

## 2022-02-10 PROCEDURE — 84100 ASSAY OF PHOSPHORUS: CPT

## 2022-02-10 PROCEDURE — 2580000003 HC RX 258: Performed by: STUDENT IN AN ORGANIZED HEALTH CARE EDUCATION/TRAINING PROGRAM

## 2022-02-10 RX ORDER — SODIUM CHLORIDE 9 MG/ML
INJECTION, SOLUTION INTRAVENOUS CONTINUOUS
Status: DISCONTINUED | OUTPATIENT
Start: 2022-02-10 | End: 2022-02-11

## 2022-02-10 RX ORDER — POTASSIUM CHLORIDE 20 MEQ/1
30 TABLET, EXTENDED RELEASE ORAL ONCE
Status: COMPLETED | OUTPATIENT
Start: 2022-02-10 | End: 2022-02-10

## 2022-02-10 RX ORDER — POTASSIUM CHLORIDE 20 MEQ/1
40 TABLET, EXTENDED RELEASE ORAL ONCE
Status: COMPLETED | OUTPATIENT
Start: 2022-02-10 | End: 2022-02-10

## 2022-02-10 RX ORDER — 0.9 % SODIUM CHLORIDE 0.9 %
1000 INTRAVENOUS SOLUTION INTRAVENOUS ONCE
Status: COMPLETED | OUTPATIENT
Start: 2022-02-10 | End: 2022-02-10

## 2022-02-10 RX ADMIN — CARBOXYMETHYLCELLULOSE SODIUM 1 DROP: 10 GEL OPHTHALMIC at 22:23

## 2022-02-10 RX ADMIN — TIZANIDINE 4 MG: 4 TABLET ORAL at 22:16

## 2022-02-10 RX ADMIN — RIVAROXABAN 20 MG: 20 TABLET, FILM COATED ORAL at 17:51

## 2022-02-10 RX ADMIN — ACETAMINOPHEN 1000 MG: 500 TABLET ORAL at 23:33

## 2022-02-10 RX ADMIN — PANTOPRAZOLE SODIUM 40 MG: 40 TABLET, DELAYED RELEASE ORAL at 05:19

## 2022-02-10 RX ADMIN — CARBOXYMETHYLCELLULOSE SODIUM 1 DROP: 10 GEL OPHTHALMIC at 14:14

## 2022-02-10 RX ADMIN — CEPHALEXIN 500 MG: 500 CAPSULE ORAL at 22:16

## 2022-02-10 RX ADMIN — POTASSIUM CHLORIDE 40 MEQ: 1500 TABLET, EXTENDED RELEASE ORAL at 11:06

## 2022-02-10 RX ADMIN — Medication 5 MG: at 22:16

## 2022-02-10 RX ADMIN — FERROUS SULFATE TAB EC 325 MG (65 MG FE EQUIVALENT) 325 MG: 325 (65 FE) TABLET DELAYED RESPONSE at 09:10

## 2022-02-10 RX ADMIN — CEPHALEXIN 500 MG: 500 CAPSULE ORAL at 09:11

## 2022-02-10 RX ADMIN — SODIUM CHLORIDE 1000 ML: 9 INJECTION, SOLUTION INTRAVENOUS at 11:05

## 2022-02-10 RX ADMIN — Medication 500000 UNITS: at 17:51

## 2022-02-10 RX ADMIN — TAMSULOSIN HYDROCHLORIDE 0.4 MG: 0.4 CAPSULE ORAL at 09:10

## 2022-02-10 RX ADMIN — CEPHALEXIN 500 MG: 500 CAPSULE ORAL at 13:30

## 2022-02-10 RX ADMIN — SODIUM CHLORIDE: 9 INJECTION, SOLUTION INTRAVENOUS at 23:24

## 2022-02-10 RX ADMIN — SODIUM CHLORIDE, PRESERVATIVE FREE 10 ML: 5 INJECTION INTRAVENOUS at 22:22

## 2022-02-10 RX ADMIN — ROPINIROLE HYDROCHLORIDE 0.25 MG: 0.25 TABLET, FILM COATED ORAL at 22:16

## 2022-02-10 RX ADMIN — ACETAMINOPHEN 1000 MG: 500 TABLET ORAL at 15:05

## 2022-02-10 RX ADMIN — PANCRELIPASE 24000 UNITS: 24000; 76000; 120000 CAPSULE, DELAYED RELEASE PELLETS ORAL at 11:06

## 2022-02-10 RX ADMIN — CEPHALEXIN 500 MG: 500 CAPSULE ORAL at 17:52

## 2022-02-10 RX ADMIN — MIDODRINE HYDROCHLORIDE 5 MG: 5 TABLET ORAL at 17:52

## 2022-02-10 RX ADMIN — PANCRELIPASE 24000 UNITS: 24000; 76000; 120000 CAPSULE, DELAYED RELEASE PELLETS ORAL at 09:09

## 2022-02-10 RX ADMIN — Medication 500000 UNITS: at 09:12

## 2022-02-10 RX ADMIN — BUDESONIDE AND FORMOTEROL FUMARATE DIHYDRATE 2 PUFF: 160; 4.5 AEROSOL RESPIRATORY (INHALATION) at 09:12

## 2022-02-10 RX ADMIN — POTASSIUM CHLORIDE 30 MEQ: 1500 TABLET, EXTENDED RELEASE ORAL at 15:05

## 2022-02-10 RX ADMIN — SODIUM CHLORIDE: 9 INJECTION, SOLUTION INTRAVENOUS at 11:14

## 2022-02-10 RX ADMIN — MIDODRINE HYDROCHLORIDE 5 MG: 5 TABLET ORAL at 09:10

## 2022-02-10 RX ADMIN — SODIUM CHLORIDE, PRESERVATIVE FREE 10 ML: 5 INJECTION INTRAVENOUS at 09:14

## 2022-02-10 RX ADMIN — Medication 500000 UNITS: at 13:30

## 2022-02-10 RX ADMIN — BUDESONIDE AND FORMOTEROL FUMARATE DIHYDRATE 2 PUFF: 160; 4.5 AEROSOL RESPIRATORY (INHALATION) at 20:57

## 2022-02-10 RX ADMIN — CARBOXYMETHYLCELLULOSE SODIUM 1 DROP: 10 GEL OPHTHALMIC at 09:09

## 2022-02-10 RX ADMIN — PANCRELIPASE 24000 UNITS: 24000; 76000; 120000 CAPSULE, DELAYED RELEASE PELLETS ORAL at 17:53

## 2022-02-10 RX ADMIN — ROPINIROLE HYDROCHLORIDE 0.25 MG: 0.25 TABLET, FILM COATED ORAL at 09:11

## 2022-02-10 ASSESSMENT — PAIN SCALES - GENERAL
PAINLEVEL_OUTOF10: 0
PAINLEVEL_OUTOF10: 3
PAINLEVEL_OUTOF10: 6
PAINLEVEL_OUTOF10: 8
PAINLEVEL_OUTOF10: 8
PAINLEVEL_OUTOF10: 10
PAINLEVEL_OUTOF10: 0
PAINLEVEL_OUTOF10: 8

## 2022-02-10 ASSESSMENT — PAIN DESCRIPTION - LOCATION: LOCATION: LEG

## 2022-02-10 ASSESSMENT — PAIN DESCRIPTION - PAIN TYPE: TYPE: ACUTE PAIN

## 2022-02-10 NOTE — PROGRESS NOTES
Progress Note    Patient:  Sarah Adam  YOB: 1945     77 y.o. male    Subjective:  Patient seen and examined at bedside this morning. No new complaints or concerns per patient. Per nursing, patient had multiple episodes of bowel movements and vomiting yesterday which prohibited his discharge to a rehab facility. He may be discharged today pending re-evaluation. Denies: fever/chills, HA, CP, SOB, dysuria, or numbness/tingling in extremities. PT: Is working with PT transferring from bed to chair, but minimal ambulation. Objective:   Vitals:    02/10/22 0200   BP: 83/62   Pulse: 70   Resp: 15   Temp:    SpO2: 98%     Gen: NAD, cooperative   Cardiovascular: Regular rate  Respiratory: no audible wheezing, symmetrical chest expansion   MSK: Left lower extremity: Prevena on and maintaining suction. No significant output in canister. Incision at hip c/d/i with staples in with no surrounding erythema or evidence of dehiscence. EHL/FHL/TA/GSC gross motor intact. Sensation intact to light touch. DP pulse 2+. Recent Labs     02/08/22  0503   WBC 6.2   HGB 7.9*   HCT 24.3*          Meds: Xarelto, Keflex    See rec for complete list    Impression: 77 y.o. male being seen and evaluated after sustaining a left interprosthetic femur fracture s/p IMN, DOS 1/30/22      Plan:     -OK to remove prevena if suction lost and replace with optifoam dressing.    -WBAT LLE   -Continue Keflex 500 mg QID (end date 2/16/22)   -Pain control: per primary team discretion   -Tolerating PO intake well  -Voiding Well  -Ice (20 min, 1 hour off) for edema/pain control  -Encourage deep breathing and IS  -DVT ppx: EPC and Xarelto   -PT/OT to continue to evaluate and treat  -OK to discharge from ortho perspective and follow up outpatient    -Please page Ortho with any questions or concerns    Zhang Garcia DO  PGY-2 Orthopedic Surgery  5:32 AM 2/10/2022

## 2022-02-10 NOTE — PROGRESS NOTES
PROGRESS NOTE    PATIENT NAME: Ashely Bowers RECORD NO. [id number]  DATE: 2/10/2022  PRIMARY CARE PHYSICIAN: JULIAN Verde CNP    HD: # 12    ASSESSMENT    Patient Active Problem List   Diagnosis    Chronic atrial fibrillation ([address]    Dyslipidemia    Gastroesophageal reflux disease without esophagitis    Osteoarthritis of lumbar spine    OA (osteoarthritis) of knee, bilateral    Foot drop, right    Hallux valgus, acquired, bilateral    Hearing impairment    Essential hypertension    Slow transit constipation    MARIAMA on CPAP    COPD (chronic obstructive pulmonary disease) (HCC)    Chronic diastolic heart failure (HCC)    History of bariatric surgery including banding leading to esophageal stricture and aspiration    BPH (benign prostatic hyperplasia)    Myalgia    Atrial fibrillation with slow ventricular response ([address]    Pacemaker pocket hematoma, initial encounter    Pulmonary hypertension ([address]    Presence of permanent cardiac pacemaker    Fall at home, initial encounter    Left displaced femoral neck fracture ([address]    Retained orthopedic hardware    COVID-19    Elevated C-reactive protein (CRP)       MEDICAL DECISION MAKING AND PLAN    Neuro   - home requip    CV - chronic afib, rate controlled. BP remains systolic 78-68V, asymptomatic, the last few days. Metoprolol already decreased from 50 to 25mg BID, several held doses this week   Will decrease to 12.5mg bid    Restarted home midodrene 2/8, increased to BID    Heme   Hgb 7.1, stable     Pulm - encourage incentive spirometry, cont COPD meds. Continue nightly CPAP     Renal -   voiding spontaneously  Strict I/O's    Hyponatremia 123, hypovolemic. 1L NS bolus and mIVF. Repeat BMP this afternoon   Hypokalemia, replaced   Cont home flomax. GI   - puree diet (prior gastric surg), creon, supplement.     Bowel regimen decreased held due to diarrhea, monitor     ID   Infectious disease on board for covid, mildly symptomatic on presentation. Received Sotrivimab . Out iso . Placed on Keflex by ortho on . Recommend 14 day course due to continued drainage in the left knee after surgery and history of left TKA. Nystatin for oral thrush, 14 day complete     Endo   Meeting goal <180     MSK    Status post left femur CMN, weightbearing as tolerated left lower extremity   Encourage PT/OT        Chief Complaint: [facility] with right hip IT fracture s/p right femoral HWR and CMN insertion    SUBJECTIVE    Enid Moore was seen and examined at bedside this morning. Denies pain this morning. States his diarrhea has somewhat improved (less overnight than yesterday). No emesis overnight. Tolerated jello last night. OBJECTIVE  VITALS: Temp: Temp: 98.8 °F (37.1 °C)Temp  Av.4 °F (36.9 °C)  Min: 97.9 °F (36.6 °C)  Max: 98.8 °F (25.8 °C) BP Systolic (08NMY), FKF:26 , Min:81 , WXE:481   Diastolic (52PJI), BPP:91, Min:57, Max:77   Pulse Pulse  Av  Min: 70  Max: 70 Resp Resp  Avg: 15.3  Min: 14  Max: 16 Pulse ox SpO2  Av.7 %  Min: 98 %  Max: 100 %  CONSTITUTIONAL: awake, alert, cooperative, no apparent distress  ENT: ears are symmetric, nares patent   HEENT: moist mucous membranes  NECK: Supple, symmetrical, trachea midline  LUNGS: no respiratory distress, no audible wheezing, no chest wall tenderness  CARDIOVASCULAR: Regular rate and rhythm  ABDOMEN: soft, nontender, nondistended, no guarding, no rebound tenderness   MUSCULOSKELETAL: LLE:   Prevena is c/d/i. No sign of strike through bleeding. Appropriate tenderness to palpation about the hip and knee. NEUROLOGIC: Awake, alert, oriented to name, place and time  EXTREMITIES: peripheral pulses normal, + pedal edema left  SKIN: normal coloration and turgor other than resolving bruising on the left knee    I/O last 3 completed shifts: In: 2580 [P.O.:2560; I.V.:20]  Out: 1770 [Urine:1770]    Drain/tube output: In: 1150 [P.O.:1120;  I.V.:30]  Out: 570 [Urine:570]    LAB:  CBC:   Recent Labs     02/07/22  1934 02/08/22  0503 02/10/22  0915   WBC  --  6.2 5.5   HGB 8.2* 7.9* 7.1*   HCT 25.0* 24.3* 22.7*   MCV  --  98.4 100.9   PLT  --  214 204     BMP:   Recent Labs     02/10/22  0915   *   K 3.3*   CL 86*   CO2 27   BUN 13   CREATININE 0.50*   GLUCOSE 95     COAGS: No results for input(s): APTT, PROT, INR in the last 72 hours. RADIOLOGY:  No results found.       Mariah Matthews DO  General Surgery PGY-3

## 2022-02-10 NOTE — CARE COORDINATION
Transitional planning-call from [employer] with trauma-going to hold patient's discharge due to patient having diarrhea and the need for repeat labs. Maybe discharge tomorrow-called Remington Fitzgerald at [facility] [place] states patient can come anytime-just let her know.  SUMMER needed

## 2022-02-10 NOTE — PLAN OF CARE
Problem: Airway Clearance - Ineffective  Goal: Achieve or maintain patent airway  2/10/2022 1001 by Cheyenne Mendoza RN  Outcome: Ongoing  2/9/2022 2239 by Power De Souza RN  Outcome: Ongoing     Problem: Gas Exchange - Impaired  Goal: Absence of hypoxia  2/10/2022 1001 by Cheyenne Mendoza RN  Outcome: Ongoing  2/9/2022 2239 by Power De Souza RN  Outcome: Ongoing  Goal: Promote optimal lung function  2/10/2022 1001 by Cheyenne Mendoza RN  Outcome: Ongoing  2/9/2022 2239 by Power De Souza RN  Outcome: Ongoing     Problem: Breathing Pattern - Ineffective  Goal: Ability to achieve and maintain a regular respiratory rate  2/10/2022 1001 by Cheyenne Mendoza RN  Outcome: Ongoing  2/9/2022 2239 by Power De Souza RN  Outcome: Ongoing     Problem:  Body Temperature -  Risk of, Imbalanced  Goal: Ability to maintain a body temperature within defined limits  2/10/2022 1001 by Cheyenne Mendoza RN  Outcome: Ongoing  2/9/2022 2239 by Power De Souza RN  Outcome: Ongoing  Goal: Will regain or maintain usual level of consciousness  2/10/2022 1001 by hCeyenne Mendoza RN  Outcome: Ongoing  2/9/2022 2239 by Power De Souza RN  Outcome: Ongoing  Goal: Complications related to the disease process, condition or treatment will be avoided or minimized  2/10/2022 1001 by Cheyenne Mendoza RN  Outcome: Ongoing  2/9/2022 2239 by Power De Souza RN  Outcome: Ongoing     Problem: Isolation Precautions - Risk of Spread of Infection  Goal: Prevent transmission of infection  2/10/2022 1001 by Cheyenne Mendoza RN  Outcome: Ongoing  2/9/2022 2239 by Power De Souza RN  Outcome: Ongoing     Problem: Nutrition Deficits  Goal: Optimize nutritional status  2/10/2022 1001 by Cheyenne Mendoza RN  Outcome: Ongoing  2/9/2022 2239 by Power De Souza RN  Outcome: Ongoing     Problem: Risk for Fluid Volume Deficit  Goal: Maintain normal heart rhythm  2/10/2022 1001 by Cheyenne Mendoza RN  Outcome: Ongoing  2/9/2022 2239 by Power De Souza RN  Outcome: Ongoing  Goal: Maintain absence of muscle cramping  2/10/2022 1001 by Kris Rodriguez RN  Outcome: Ongoing  2/9/2022 2239 by Efraín Fishman RN  Outcome: Ongoing  Goal: Maintain normal serum potassium, sodium, calcium, phosphorus, and pH  2/10/2022 1001 by Kris Rodriguez RN  Outcome: Ongoing  2/9/2022 2239 by Efraín Fishman RN  Outcome: Ongoing     Problem: Loneliness or Risk for Loneliness  Goal: Demonstrate positive use of time alone when socialization is not possible  2/10/2022 1001 by Kris Rodriguez RN  Outcome: Ongoing  2/9/2022 2239 by Efraín Fishman RN  Outcome: Ongoing     Problem: Fatigue  Goal: Verbalize increase energy and improved vitality  2/10/2022 1001 by Kris Rodriguez RN  Outcome: Ongoing  2/9/2022 2239 by Efraín Fishman RN  Outcome: Ongoing     Problem: Patient Education: Go to Patient Education Activity  Goal: Patient/Family Education  2/10/2022 1001 by Kris Rodriguez RN  Outcome: Ongoing  2/9/2022 2239 by Efraín Fishman RN  Outcome: Ongoing     Problem: Pain:  Goal: Pain level will decrease  Description: Pain level will decrease  2/10/2022 1001 by Kris Rodriguez RN  Outcome: Ongoing  2/9/2022 2239 by Efraín Fishman RN  Outcome: Ongoing  Goal: Control of acute pain  Description: Control of acute pain  2/10/2022 1001 by Kris Rodriguez RN  Outcome: Ongoing  2/9/2022 2239 by Efraín Fishman RN  Outcome: Ongoing  Goal: Control of chronic pain  Description: Control of chronic pain  2/10/2022 1001 by Kris Rodriguez RN  Outcome: Ongoing  2/9/2022 2239 by Efraín Fishman RN  Outcome: Ongoing     Problem: Skin Integrity:  Goal: Will show no infection signs and symptoms  Description: Will show no infection signs and symptoms  2/10/2022 1001 by Kris Rodriguez RN  Outcome: Ongoing  2/9/2022 2239 by Efraín Fishman RN  Outcome: Ongoing  Goal: Absence of new skin breakdown  Description: Absence of new skin breakdown  2/10/2022 1001 by Kris Rodriguez RN  Outcome: Ongoing  2/9/2022 2239 by Efraín Fishman RN  Outcome: Ongoing     Problem: Falls - Risk of:  Goal: Will remain free from falls  Description: Will remain free from falls  2/10/2022 1001 by Karen Talavera RN  Outcome: Ongoing  2/9/2022 2239 by Kaylie Hilario RN  Outcome: Ongoing  Goal: Absence of physical injury  Description: Absence of physical injury  2/10/2022 1001 by Karen Talavera RN  Outcome: Ongoing  2/9/2022 2239 by Kaylie Hilario RN  Outcome: Ongoing     Problem: Nutrition  Goal: Optimal nutrition therapy  2/10/2022 1001 by Karen Talavera RN  Outcome: Ongoing  2/9/2022 2239 by Kaylie Hilario RN  Outcome: Ongoing

## 2022-02-10 NOTE — CARE COORDINATION
Dispo planning     Met with the pt at the beside. Updated him that he would not discharge today due to need for close monitoring of labs. Pt understanding asked writer to update wife. Called pt's wife, Agnes Townsend. No questions or concerns. Updated CM, Oscar Chan pt is not yet medically stable for discharge. Cm requesting to remove discharge order.  Updated Trauma team.

## 2022-02-10 NOTE — PLAN OF CARE
Problem: Airway Clearance - Ineffective  Goal: Achieve or maintain patent airway  Outcome: Ongoing     Problem: Gas Exchange - Impaired  Goal: Absence of hypoxia  Outcome: Ongoing  Goal: Promote optimal lung function  Outcome: Ongoing     Problem: Breathing Pattern - Ineffective  Goal: Ability to achieve and maintain a regular respiratory rate  2/9/2022 2239 by Jonny Abbott RN  Outcome: Ongoing  2/9/2022 1128 by Brissa Gonzalez RN  Outcome: Ongoing     Problem:  Body Temperature -  Risk of, Imbalanced  Goal: Ability to maintain a body temperature within defined limits  Outcome: Ongoing  Goal: Will regain or maintain usual level of consciousness  Outcome: Ongoing  Goal: Complications related to the disease process, condition or treatment will be avoided or minimized  Outcome: Ongoing     Problem: Isolation Precautions - Risk of Spread of Infection  Goal: Prevent transmission of infection  2/9/2022 2239 by Jonny Abbott RN  Outcome: Ongoing  2/9/2022 1128 by Brissa Gonzalez RN  Outcome: Ongoing     Problem: Nutrition Deficits  Goal: Optimize nutritional status  2/9/2022 2239 by Jonny Abbott RN  Outcome: Ongoing  2/9/2022 1128 by Brissa Gonzalez RN  Outcome: Ongoing     Problem: Risk for Fluid Volume Deficit  Goal: Maintain normal heart rhythm  Outcome: Ongoing  Goal: Maintain absence of muscle cramping  Outcome: Ongoing  Goal: Maintain normal serum potassium, sodium, calcium, phosphorus, and pH  Outcome: Ongoing     Problem: Loneliness or Risk for Loneliness  Goal: Demonstrate positive use of time alone when socialization is not possible  2/9/2022 2239 by Jonny Abbott RN  Outcome: Ongoing  2/9/2022 1128 by Brissa Gonzalez RN  Outcome: Ongoing     Problem: Fatigue  Goal: Verbalize increase energy and improved vitality  2/9/2022 2239 by Jonny Abbott RN  Outcome: Ongoing  2/9/2022 1128 by Brissa Gonzalez RN  Outcome: Ongoing     Problem: Patient Education: Go to Patient Education Activity  Goal: Patient/Family Education  Outcome: Ongoing     Problem: Pain:  Goal: Pain level will decrease  Description: Pain level will decrease  Outcome: Ongoing  Goal: Control of acute pain  Description: Control of acute pain  Outcome: Ongoing  Goal: Control of chronic pain  Description: Control of chronic pain  Outcome: Ongoing     Problem: Skin Integrity:  Goal: Will show no infection signs and symptoms  Description: Will show no infection signs and symptoms  2/9/2022 2239 by Jamey Bhatia RN  Outcome: Ongoing  2/9/2022 1128 by Genoveva Kemp RN  Outcome: Ongoing  Goal: Absence of new skin breakdown  Description: Absence of new skin breakdown  Outcome: Ongoing     Problem: Falls - Risk of:  Goal: Will remain free from falls  Description: Will remain free from falls  2/9/2022 2239 by Jamey Bhatia RN  Outcome: Ongoing  2/9/2022 1128 by Genoveva Kemp RN  Outcome: Ongoing  Goal: Absence of physical injury  Description: Absence of physical injury  Outcome: Ongoing     Problem: Nutrition  Goal: Optimal nutrition therapy  Outcome: Ongoing      Frequent BMs continued during dayshift. Senna and glycol d/c'd. Metoprolol held r/t hypotension. MD notified. Requested 1x dose of loperamide. External catheter changed. Incontinence care provided. 3 episodes of bowel and bladder incontinence. BM: loose, soft, brown. Pt refused nystatin. All dressings to LLE changed. New mepilex and zinc cream applied to sacral area. Redness noted. Tenderness reported by patient r/t frequency of turns/ BMs last 24 hours. Pt placed on 2L QHS since pt refused CPAP. Still no output from LLE wound vac. Hypotensive the majority of the shift/ MAP maintained >65. Midnight dose of midodrine held r/t orders specifying not to give after 1800 or within 4 hours of bedtime. Pt to be reassed for discharge to [facility] in the morning. No other events.

## 2022-02-10 NOTE — PROGRESS NOTES
PATIENT REFUSES TO WEAR BIPAP     [x] Risks and benefits explained to patient   [x] Patient refuses to wear Bipap stating \" It's not like my home unit, I can't sleep with it on\"  [x] Patient verbalizes understanding of information presented. Pt currently on 2L NC Spo2 100%  Pt states he tried out hospital unit last night & it was too much for him. Will continue to monitor throughout the night.

## 2022-02-10 NOTE — PROGRESS NOTES
Patient is complaining of pain of 8 out of 10 and states the tylenol has not helped him. Messaged attending doctor about pain concerns and to get an update of plan of care for case management.

## 2022-02-11 LAB
-: NORMAL
ABSOLUTE EOS #: 0.55 K/UL (ref 0–0.44)
ABSOLUTE IMMATURE GRANULOCYTE: 0.06 K/UL (ref 0–0.3)
ABSOLUTE LYMPH #: 0.67 K/UL (ref 1.1–3.7)
ABSOLUTE MONO #: 0.92 K/UL (ref 0.1–1.2)
ALLEN TEST: POSITIVE
ANION GAP SERPL CALCULATED.3IONS-SCNC: 10 MMOL/L (ref 9–17)
ANION GAP SERPL CALCULATED.3IONS-SCNC: 7 MMOL/L (ref 9–17)
ANION GAP SERPL CALCULATED.3IONS-SCNC: NORMAL MMOL/L (ref 9–17)
ANION GAP: 9 MMOL/L (ref 7–16)
BASOPHILS # BLD: 1 % (ref 0–2)
BASOPHILS ABSOLUTE: 0.06 K/UL (ref 0–0.2)
BUN BLDV-MCNC: 11 MG/DL (ref 8–23)
BUN BLDV-MCNC: 11 MG/DL (ref 8–23)
BUN BLDV-MCNC: NORMAL MG/DL (ref 8–23)
BUN/CREAT BLD: ABNORMAL (ref 9–20)
BUN/CREAT BLD: ABNORMAL (ref 9–20)
BUN/CREAT BLD: NORMAL (ref 9–20)
CALCIUM SERPL-MCNC: 8.1 MG/DL (ref 8.6–10.4)
CALCIUM SERPL-MCNC: 8.2 MG/DL (ref 8.6–10.4)
CALCIUM SERPL-MCNC: NORMAL MG/DL (ref 8.6–10.4)
CHLORIDE BLD-SCNC: 93 MMOL/L (ref 98–107)
CHLORIDE BLD-SCNC: 96 MMOL/L (ref 98–107)
CHLORIDE BLD-SCNC: NORMAL MMOL/L (ref 98–107)
CO2: 24 MMOL/L (ref 20–31)
CO2: 25 MMOL/L (ref 20–31)
CO2: NORMAL MMOL/L (ref 20–31)
CREAT SERPL-MCNC: 0.51 MG/DL (ref 0.7–1.2)
CREAT SERPL-MCNC: 0.56 MG/DL (ref 0.7–1.2)
CREAT SERPL-MCNC: NORMAL MG/DL (ref 0.7–1.2)
DIFFERENTIAL TYPE: ABNORMAL
EOSINOPHILS RELATIVE PERCENT: 9 % (ref 1–4)
FIO2: 2
GFR AFRICAN AMERICAN: >60 ML/MIN
GFR AFRICAN AMERICAN: >60 ML/MIN
GFR AFRICAN AMERICAN: NORMAL ML/MIN
GFR NON-AFRICAN AMERICAN: >60 ML/MIN
GFR NON-AFRICAN AMERICAN: NORMAL ML/MIN
GFR SERPL CREATININE-BSD FRML MDRD: >60 ML/MIN
GFR SERPL CREATININE-BSD FRML MDRD: ABNORMAL ML/MIN/{1.73_M2}
GFR SERPL CREATININE-BSD FRML MDRD: NORMAL ML/MIN/{1.73_M2}
GLUCOSE BLD-MCNC: 111 MG/DL (ref 74–100)
GLUCOSE BLD-MCNC: 114 MG/DL (ref 70–99)
GLUCOSE BLD-MCNC: 98 MG/DL (ref 70–99)
GLUCOSE BLD-MCNC: NORMAL MG/DL (ref 70–99)
HCT VFR BLD CALC: 22.1 % (ref 40.7–50.3)
HEMOGLOBIN: 7.1 G/DL (ref 13–17)
IMMATURE GRANULOCYTES: 1 %
LYMPHOCYTES # BLD: 11 % (ref 24–43)
MCH RBC QN AUTO: 32 PG (ref 25.2–33.5)
MCHC RBC AUTO-ENTMCNC: 31.7 G/DL (ref 28.4–34.8)
MCV RBC AUTO: 100.9 FL (ref 82.6–102.9)
MODE: ABNORMAL
MONOCYTES # BLD: 15 % (ref 3–12)
MORPHOLOGY: ABNORMAL
NEGATIVE BASE EXCESS, ART: ABNORMAL (ref 0–2)
NRBC AUTOMATED: 0 PER 100 WBC
O2 DEVICE/FLOW/%: ABNORMAL
PATIENT TEMP: ABNORMAL
PDW BLD-RTO: 17.1 % (ref 11.8–14.4)
PLATELET # BLD: 197 K/UL (ref 138–453)
PLATELET ESTIMATE: ABNORMAL
PMV BLD AUTO: 9 FL (ref 8.1–13.5)
POC BUN: 11 MG/DL (ref 8–26)
POC CHLORIDE: 90 MMOL/L (ref 98–107)
POC CREATININE: 0.76 MG/DL (ref 0.51–1.19)
POC HCO3: 26.9 MMOL/L (ref 21–28)
POC HEMATOCRIT: 19 % (ref 41–53)
POC HEMOGLOBIN: 6.3 G/DL (ref 13.5–17.5)
POC IONIZED CALCIUM: 1.14 MMOL/L (ref 1.15–1.33)
POC LACTIC ACID: 1 MMOL/L (ref 0.56–1.39)
POC O2 SATURATION: 99 % (ref 94–98)
POC PCO2 TEMP: ABNORMAL MM HG
POC PCO2: 31.3 MM HG (ref 35–48)
POC PH TEMP: ABNORMAL
POC PH: 7.54 (ref 7.35–7.45)
POC PO2 TEMP: ABNORMAL MM HG
POC PO2: 126.9 MM HG (ref 83–108)
POC POTASSIUM: 3.7 MMOL/L (ref 3.5–4.5)
POC SODIUM: 125 MMOL/L (ref 138–146)
POC TCO2: 27 MMOL/L (ref 22–30)
POSITIVE BASE EXCESS, ART: 4 (ref 0–3)
POTASSIUM SERPL-SCNC: 3.5 MMOL/L (ref 3.7–5.3)
POTASSIUM SERPL-SCNC: 3.7 MMOL/L (ref 3.7–5.3)
POTASSIUM SERPL-SCNC: NORMAL MMOL/L (ref 3.7–5.3)
RBC # BLD: 2.19 M/UL (ref 4.21–5.77)
RBC # BLD: ABNORMAL 10*6/UL
REASON FOR REJECTION: NORMAL
SAMPLE SITE: ABNORMAL
SEG NEUTROPHILS: 63 % (ref 36–65)
SEGMENTED NEUTROPHILS ABSOLUTE COUNT: 3.84 K/UL (ref 1.5–8.1)
SODIUM BLD-SCNC: 127 MMOL/L (ref 135–144)
SODIUM BLD-SCNC: 128 MMOL/L (ref 135–144)
SODIUM BLD-SCNC: NORMAL MMOL/L (ref 135–144)
TCO2 (CALC), ART: ABNORMAL MMOL/L (ref 22–29)
WBC # BLD: 6.1 K/UL (ref 3.5–11.3)
WBC # BLD: ABNORMAL 10*3/UL
ZZ NTE CLEAN UP: ORDERED TEST: NORMAL
ZZ NTE WITH NAME CLEAN UP: SPECIMEN SOURCE: NORMAL

## 2022-02-11 PROCEDURE — 6370000000 HC RX 637 (ALT 250 FOR IP): Performed by: NURSE PRACTITIONER

## 2022-02-11 PROCEDURE — 83605 ASSAY OF LACTIC ACID: CPT

## 2022-02-11 PROCEDURE — 82565 ASSAY OF CREATININE: CPT

## 2022-02-11 PROCEDURE — 36600 WITHDRAWAL OF ARTERIAL BLOOD: CPT

## 2022-02-11 PROCEDURE — 1200000000 HC SEMI PRIVATE

## 2022-02-11 PROCEDURE — 80048 BASIC METABOLIC PNL TOTAL CA: CPT

## 2022-02-11 PROCEDURE — 80051 ELECTROLYTE PANEL: CPT

## 2022-02-11 PROCEDURE — 2580000003 HC RX 258: Performed by: STUDENT IN AN ORGANIZED HEALTH CARE EDUCATION/TRAINING PROGRAM

## 2022-02-11 PROCEDURE — 85014 HEMATOCRIT: CPT

## 2022-02-11 PROCEDURE — 6370000000 HC RX 637 (ALT 250 FOR IP): Performed by: STUDENT IN AN ORGANIZED HEALTH CARE EDUCATION/TRAINING PROGRAM

## 2022-02-11 PROCEDURE — 84520 ASSAY OF UREA NITROGEN: CPT

## 2022-02-11 PROCEDURE — 82803 BLOOD GASES ANY COMBINATION: CPT

## 2022-02-11 PROCEDURE — 6370000000 HC RX 637 (ALT 250 FOR IP): Performed by: HEALTH CARE PROVIDER

## 2022-02-11 PROCEDURE — 82330 ASSAY OF CALCIUM: CPT

## 2022-02-11 PROCEDURE — 97530 THERAPEUTIC ACTIVITIES: CPT

## 2022-02-11 PROCEDURE — 82947 ASSAY GLUCOSE BLOOD QUANT: CPT

## 2022-02-11 PROCEDURE — 85025 COMPLETE CBC W/AUTO DIFF WBC: CPT

## 2022-02-11 PROCEDURE — 36415 COLL VENOUS BLD VENIPUNCTURE: CPT

## 2022-02-11 RX ORDER — SODIUM CHLORIDE 1000 MG
1 TABLET, SOLUBLE MISCELLANEOUS
Status: DISCONTINUED | OUTPATIENT
Start: 2022-02-11 | End: 2022-02-12 | Stop reason: HOSPADM

## 2022-02-11 RX ORDER — TIZANIDINE 4 MG/1
4 TABLET ORAL NIGHTLY PRN
Qty: 30 TABLET | Refills: 0 | Status: SHIPPED | OUTPATIENT
Start: 2022-02-11 | End: 2022-03-13

## 2022-02-11 RX ADMIN — TIZANIDINE 4 MG: 4 TABLET ORAL at 21:05

## 2022-02-11 RX ADMIN — PANCRELIPASE 24000 UNITS: 24000; 76000; 120000 CAPSULE, DELAYED RELEASE PELLETS ORAL at 08:52

## 2022-02-11 RX ADMIN — CEPHALEXIN 500 MG: 500 CAPSULE ORAL at 17:30

## 2022-02-11 RX ADMIN — ROPINIROLE HYDROCHLORIDE 0.25 MG: 0.25 TABLET, FILM COATED ORAL at 08:52

## 2022-02-11 RX ADMIN — CEPHALEXIN 500 MG: 500 CAPSULE ORAL at 08:51

## 2022-02-11 RX ADMIN — ONDANSETRON 4 MG: 4 TABLET, ORALLY DISINTEGRATING ORAL at 10:10

## 2022-02-11 RX ADMIN — SODIUM CHLORIDE TAB 1 GM 1 G: 1 TAB at 17:30

## 2022-02-11 RX ADMIN — TAMSULOSIN HYDROCHLORIDE 0.4 MG: 0.4 CAPSULE ORAL at 09:08

## 2022-02-11 RX ADMIN — PANCRELIPASE 24000 UNITS: 24000; 76000; 120000 CAPSULE, DELAYED RELEASE PELLETS ORAL at 17:31

## 2022-02-11 RX ADMIN — SODIUM CHLORIDE TAB 1 GM 1 G: 1 TAB at 08:30

## 2022-02-11 RX ADMIN — ONDANSETRON 4 MG: 4 TABLET, ORALLY DISINTEGRATING ORAL at 18:30

## 2022-02-11 RX ADMIN — MIDODRINE HYDROCHLORIDE 5 MG: 5 TABLET ORAL at 08:51

## 2022-02-11 RX ADMIN — SODIUM CHLORIDE, PRESERVATIVE FREE 10 ML: 5 INJECTION INTRAVENOUS at 08:52

## 2022-02-11 RX ADMIN — ACETAMINOPHEN 1000 MG: 500 TABLET ORAL at 23:52

## 2022-02-11 RX ADMIN — CEPHALEXIN 500 MG: 500 CAPSULE ORAL at 11:27

## 2022-02-11 RX ADMIN — MIDODRINE HYDROCHLORIDE 5 MG: 5 TABLET ORAL at 01:08

## 2022-02-11 RX ADMIN — SODIUM CHLORIDE: 9 INJECTION, SOLUTION INTRAVENOUS at 08:51

## 2022-02-11 RX ADMIN — SODIUM CHLORIDE, PRESERVATIVE FREE 10 ML: 5 INJECTION INTRAVENOUS at 20:05

## 2022-02-11 RX ADMIN — Medication 500000 UNITS: at 08:52

## 2022-02-11 RX ADMIN — PANTOPRAZOLE SODIUM 40 MG: 40 TABLET, DELAYED RELEASE ORAL at 05:25

## 2022-02-11 RX ADMIN — Medication 5 MG: at 20:05

## 2022-02-11 RX ADMIN — CEPHALEXIN 500 MG: 500 CAPSULE ORAL at 21:00

## 2022-02-11 RX ADMIN — Medication 500000 UNITS: at 20:05

## 2022-02-11 RX ADMIN — MIDODRINE HYDROCHLORIDE 5 MG: 5 TABLET ORAL at 17:31

## 2022-02-11 RX ADMIN — METOPROLOL TARTRATE 12.5 MG: 25 TABLET ORAL at 08:51

## 2022-02-11 RX ADMIN — RIVAROXABAN 20 MG: 20 TABLET, FILM COATED ORAL at 17:31

## 2022-02-11 RX ADMIN — BUDESONIDE AND FORMOTEROL FUMARATE DIHYDRATE 2 PUFF: 160; 4.5 AEROSOL RESPIRATORY (INHALATION) at 08:52

## 2022-02-11 RX ADMIN — CARBOXYMETHYLCELLULOSE SODIUM 1 DROP: 10 GEL OPHTHALMIC at 13:19

## 2022-02-11 RX ADMIN — BUDESONIDE AND FORMOTEROL FUMARATE DIHYDRATE 2 PUFF: 160; 4.5 AEROSOL RESPIRATORY (INHALATION) at 21:05

## 2022-02-11 RX ADMIN — SODIUM CHLORIDE TAB 1 GM 1 G: 1 TAB at 11:27

## 2022-02-11 RX ADMIN — PANCRELIPASE 24000 UNITS: 24000; 76000; 120000 CAPSULE, DELAYED RELEASE PELLETS ORAL at 11:27

## 2022-02-11 RX ADMIN — ACETAMINOPHEN 1000 MG: 500 TABLET ORAL at 08:52

## 2022-02-11 RX ADMIN — ROPINIROLE HYDROCHLORIDE 0.25 MG: 0.25 TABLET, FILM COATED ORAL at 20:04

## 2022-02-11 ASSESSMENT — PAIN DESCRIPTION - ORIENTATION
ORIENTATION: LEFT
ORIENTATION: LEFT

## 2022-02-11 ASSESSMENT — PAIN DESCRIPTION - PAIN TYPE
TYPE: CHRONIC PAIN;SURGICAL PAIN
TYPE: ACUTE PAIN

## 2022-02-11 ASSESSMENT — PAIN SCALES - GENERAL
PAINLEVEL_OUTOF10: 8
PAINLEVEL_OUTOF10: 3
PAINLEVEL_OUTOF10: 8
PAINLEVEL_OUTOF10: 0
PAINLEVEL_OUTOF10: 3

## 2022-02-11 ASSESSMENT — PAIN DESCRIPTION - LOCATION
LOCATION: LEG
LOCATION: KNEE

## 2022-02-11 ASSESSMENT — PAIN DESCRIPTION - ONSET: ONSET: ON-GOING

## 2022-02-11 ASSESSMENT — PAIN DESCRIPTION - DESCRIPTORS: DESCRIPTORS: CONSTANT

## 2022-02-11 ASSESSMENT — PAIN DESCRIPTION - FREQUENCY: FREQUENCY: CONTINUOUS

## 2022-02-11 NOTE — PLAN OF CARE
Problem: Airway Clearance - Ineffective  Goal: Achieve or maintain patent airway  2/11/2022 0939 by Karthik Hauser RN  Outcome: Ongoing  2/11/2022 0420 by Reno Benitez RN  Outcome: Ongoing     Problem: Gas Exchange - Impaired  Goal: Absence of hypoxia  2/11/2022 0939 by Karthik Hauser RN  Outcome: Ongoing  2/11/2022 0420 by Reno Benitez RN  Outcome: Ongoing  Goal: Promote optimal lung function  2/11/2022 0939 by Karthik Hauser RN  Outcome: Ongoing  2/11/2022 0420 by Reno Benitez RN  Outcome: Ongoing     Problem: Breathing Pattern - Ineffective  Goal: Ability to achieve and maintain a regular respiratory rate  2/11/2022 0939 by Karthik Hauser RN  Outcome: Ongoing  2/11/2022 0420 by Reno Benitez RN  Outcome: Ongoing     Problem:  Body Temperature -  Risk of, Imbalanced  Goal: Ability to maintain a body temperature within defined limits  2/11/2022 0939 by Karthik Huaser RN  Outcome: Ongoing  2/11/2022 0420 by Reon Benitez RN  Outcome: Ongoing  Goal: Will regain or maintain usual level of consciousness  2/11/2022 0939 by Karthik Hauser RN  Outcome: Ongoing  2/11/2022 0420 by Reno Benitez RN  Outcome: Ongoing  Goal: Complications related to the disease process, condition or treatment will be avoided or minimized  2/11/2022 0939 by Karthik Hauser RN  Outcome: Ongoing  2/11/2022 0420 by Reno Benitez RN  Outcome: Ongoing     Problem: Isolation Precautions - Risk of Spread of Infection  Goal: Prevent transmission of infection  2/11/2022 0939 by Karthik Hauser RN  Outcome: Ongoing  2/11/2022 0420 by Reno Benitez RN  Outcome: Ongoing     Problem: Nutrition Deficits  Goal: Optimize nutritional status  2/11/2022 0939 by Karthik Hauser RN  Outcome: Ongoing  2/11/2022 0420 by Reno Benitez RN  Outcome: Ongoing     Problem: Risk for Fluid Volume Deficit  Goal: Maintain normal heart rhythm  2/11/2022 0939 by Karthik Hauser RN  Outcome: Ongoing  2/11/2022 0420 by Tatyana Kessler RN  Outcome: Ongoing  Goal: Maintain absence of muscle cramping  2/11/2022 0939 by Holland Tidwell RN  Outcome: Ongoing  2/11/2022 0420 by Tatyana Kessler RN  Outcome: Ongoing  Goal: Maintain normal serum potassium, sodium, calcium, phosphorus, and pH  2/11/2022 0939 by Holland Tidwell RN  Outcome: Ongoing  2/11/2022 0420 by Tatyana Kessler RN  Outcome: Ongoing     Problem: Loneliness or Risk for Loneliness  Goal: Demonstrate positive use of time alone when socialization is not possible  2/11/2022 0939 by Holland Tidwell RN  Outcome: Ongoing  2/11/2022 0420 by Tatyana Kessler RN  Outcome: Ongoing     Problem: Fatigue  Goal: Verbalize increase energy and improved vitality  2/11/2022 0939 by Holland Tidwell RN  Outcome: Ongoing  2/11/2022 0420 by Tatyana Kessler RN  Outcome: Ongoing     Problem: Patient Education: Go to Patient Education Activity  Goal: Patient/Family Education  2/11/2022 7174 by Holland Tidwell RN  Outcome: Ongoing  2/11/2022 0420 by Tatyana Kessler RN  Outcome: Ongoing     Problem: Pain:  Goal: Pain level will decrease  Description: Pain level will decrease  2/11/2022 0939 by Holland Tidwell RN  Outcome: Ongoing  2/11/2022 0420 by Tatyana Kessler RN  Outcome: Ongoing  Goal: Control of acute pain  Description: Control of acute pain  2/11/2022 0939 by Holland Tidwell RN  Outcome: Ongoing  2/11/2022 0420 by Tatyana Kessler RN  Outcome: Ongoing  Goal: Control of chronic pain  Description: Control of chronic pain  2/11/2022 0939 by Holland Tidwell RN  Outcome: Ongoing  2/11/2022 0420 by Tatyana Kessler RN  Outcome: Ongoing     Problem: Skin Integrity:  Goal: Will show no infection signs and symptoms  Description: Will show no infection signs and symptoms  2/11/2022 0939 by Holland Tidwell RN  Outcome: Ongoing  2/11/2022 0420 by Tatyana Kessler RN  Outcome: Ongoing  Goal: Absence of new skin breakdown  Description: Absence of new skin breakdown  2/11/2022 0939 by Jean Rojo RN  Outcome: Ongoing  2/11/2022 0420 by Nai Lyons RN  Outcome: Ongoing     Problem: Falls - Risk of:  Goal: Will remain free from falls  Description: Will remain free from falls  2/11/2022 0939 by Jean Rojo RN  Outcome: Ongoing  2/11/2022 0420 by Nai Lyons RN  Outcome: Ongoing  Goal: Absence of physical injury  Description: Absence of physical injury  2/11/2022 0939 by Jean Rojo RN  Outcome: Ongoing  2/11/2022 0420 by Nai Lyons RN  Outcome: Ongoing     Problem: Nutrition  Goal: Optimal nutrition therapy  2/11/2022 0939 by Jean Rojo RN  Outcome: Ongoing  2/11/2022 0420 by Nai Lyons RN  Outcome: Ongoing

## 2022-02-11 NOTE — PROGRESS NOTES
PT/OT    Dispo planning to facility        Chief Complaint: diarrhea     SUBJECTIVE    Renate Kuldeep was seen and examined at bedside this morning. Denies pain this morning. States his diarrhea has somewhat improved , 2 episodes yesterday). No emesis overnight. Tolerating diet. OBJECTIVE  VITALS: Temp: Temp: 98.6 °F (37 °C)Temp  Av.5 °F (36.9 °C)  Min: 98.2 °F (36.8 °C)  Max: 98.6 °F (37 °C) BP Systolic (42CUL), QEE:347 , Min:103 , XVZ:376   Diastolic (23VWA), KKN:54, Min:67, Max:88   Pulse Pulse  Av  Min: 70  Max: 70 Resp Resp  Av.4  Min: 13  Max: 21 Pulse ox SpO2  Av.8 %  Min: 98 %  Max: 100 %  CONSTITUTIONAL: awake, alert, cooperative, no apparent distress  LUNGS: no respiratory distress, no audible wheezing, no chest wall tenderness  CARDIOVASCULAR: Regular rate and rhythm  ABDOMEN: soft, nontender, nondistended, no guarding, no rebound tenderness   MUSCULOSKELETAL: Appropriate tenderness to palpation about the hip and knee. Incision CDI  NEUROLOGIC: Awake, alert, oriented to name, place and time  EXTREMITIES: peripheral pulses normal, + pedal edema left  SKIN: normal coloration and turgor other than resolving bruising on the left knee    I/O last 3 completed shifts: In: 1450 [P.O.:930; I.V.:2930]  Out: 850 [Urine:850]    Drain/tube output: In: 5302 [P.O.:450; I.V.:2930]  Out: 750 [Urine:750]    LAB:  CBC:   Recent Labs     02/10/22  0915 22  0749   WBC 5.5 6.1   HGB 7.1* 7.1*   HCT 22.7* 22.1*   .9 100.9    197     BMP:   Recent Labs     02/10/22  1830 22  0113 22  0609 22  0749   *  --  PLEASE DISREGARD RESULTS. SPECIMEN CONTAMINATED. 127*   K 3.4*  --  PLEASE DISREGARD RESULTS. SPECIMEN CONTAMINATED. 3.5*   CL 89*  --  PLEASE DISREGARD RESULTS. SPECIMEN CONTAMINATED. 93*   CO2 24  --  PLEASE DISREGARD RESULTS. SPECIMEN CONTAMINATED. 24   BUN 14  --  PLEASE DISREGARD RESULTS. SPECIMEN CONTAMINATED.  11   CREATININE 0.51* 0.76 PLEASE DISREGARD RESULTS. SPECIMEN CONTAMINATED. 0.56*   GLUCOSE 104*  --  PLEASE DISREGARD RESULTS. SPECIMEN CONTAMINATED. 98     COAGS: No results for input(s): APTT, PROT, INR in the last 72 hours. RADIOLOGY:  No results found.     Electronically signed by Stacy Hollins DO on 2/11/2022 at 12:12 PM

## 2022-02-11 NOTE — PROGRESS NOTES
Comprehensive Nutrition Assessment    Type and Reason for Visit:  Reassess    Nutrition Recommendations/Plan: Continue current diet. Will continue to provide Ensure Clear Liquid ONS with meals; will also provide Boost Pudding x 2 per day. Encourage/monitor PO intakes as tolerated. Will monitor nausea/vomiting, labs, weights, and plan of care. Nutrition Assessment:  Pt ate about 25% of pancakes this morning for breakfast.  Pt has been drinking some of the Ensure Clear Liquid oral supplements. Will also provide Boost pudding to boost intakes. Pt with c/o nausea. Noted yesterday pt with c/o vomiting and diarrhea. Last BM 2/11. Labs reviewed: Na 127 mmol/L, K 3.5 mmol/L, Hgb 7.1 g/dL. Meds include: Creon, Phenergan, NaCl tablets, Zofran PRN. Malnutrition Assessment:  Malnutrition Status: Moderate malnutrition    Context:  Chronic Illness     Findings of the 6 clinical characteristics of malnutrition:  Energy Intake:  Mild decrease in energy intake - Pt reports decreased appetite; h/o gastric bypass surgery. Weight Loss:  7 - Greater than 7.5% over 3 months - Weight fluctuations since admission noted. Body Fat Loss:   (Mild-Moderate body fat loss) Orbital,Fat Overlying Ribs   Muscle Mass Loss:   (Moderate muscle mass loss) Clavicles (pectoralis & deltoids),Temples (temporalis)  Fluid Accumulation:  1 - Mild Extremities   Strength:  Not Performed    Estimated Daily Nutrient Needs:  Energy (kcal):  20-22 kcal/kg = 3286-3936 kcals/day; Weight Used for Energy Requirements:  Current     Protein (g):  1.2-1.5 gm/kg =  gm pro/day; Weight Used for Protein Requirements:  Ideal        Fluid (ml/day):  1900ml/d (25ml/kg); Method Used for Fluid Requirements:  ml/Kg      Nutrition Related Findings:  Labs/Meds reviewed. C/o nausea and diarrhea. Last BM 2/11. H/o gastric surgery.       Wounds:  Pressure Injury,Stage II (to coccyx; multiple surgical incisions; wound vac to left knee)       Current Nutrition Therapies:    ADULT DIET; Easy to Chew  ADULT ORAL NUTRITION SUPPLEMENT; Lunch; Snack; coffee for headache  ADULT ORAL NUTRITION SUPPLEMENT; Breakfast, Lunch, Dinner; Clear Liquid Oral Supplement    Anthropometric Measures:  · Height: 5' 10\" (177.8 cm)  · Current Body Weight: 201 lb 1.6 oz (91.2 kg)   · Admission Body Weight: 170 lb 13.7 oz (77.5 kg)    · Usual Body Weight: 193 lb 9.6 oz (87.8 kg) (11/10/21 bed scale per chart review)     · Ideal Body Weight: 166 lbs; % Ideal Body Weight 121.1 %   · BMI: 28.9  · BMI Categories: Overweight (BMI 25.0-29. 9)       Nutrition Diagnosis:   · Increased nutrient needs related to increase demand for energy/nutrients as evidenced by wounds    · Inadequate oral intake related to  (decreased appetite; h/o gastric bypass surgery) as evidenced by intake 0-25%,intake 26-50%,intake 51-75% (variable PO intakes; need for ONS)    Nutrition Interventions:   Food and/or Nutrient Delivery:  Continue Current Diet,Modify Oral Nutrition Supplement (Continue to provide Ensure Clear Liquid oral supplements. Provide Boost pudding x 2 per day.)  Nutrition Education/Counseling:  No recommendation at this time   Coordination of Nutrition Care:  Continue to monitor while inpatient    Goals:  Meet 50% or greater of estimated nutrition needs       Nutrition Monitoring and Evaluation:   Behavioral-Environmental Outcomes:  None Identified   Food/Nutrient Intake Outcomes:  Food and Nutrient Intake,Supplement Intake  Physical Signs/Symptoms Outcomes:  Biochemical Data,GI Status,Diarrhea,Nausea or Vomiting,Fluid Status or Edema,Nutrition Focused Physical Findings,Skin,Weight     Discharge Planning:     Too soon to determine     Electronically signed by Ashley Moreno RD, LD on 2/11/22 at 11:01 AM EST    Contact: [phone]

## 2022-02-11 NOTE — PROGRESS NOTES
Physical Therapy  Facility/Department: [facility] CAR 2  Daily Treatment Note  NAME: Sarah Adam  : 1945  MRN: [id number]    Date of Service: 2022    Discharge Recommendations:  Patient would benefit from continued therapy after discharge   PT Equipment Recommendations  Equipment Needed: Yes  Mobility Devices: Harini [place]: Rolling    Assessment   Body structures, Functions, Activity limitations: Decreased functional mobility ; Decreased posture;Decreased endurance;Decreased ROM; Decreased strength;Decreased balance; Increased pain  Assessment: Pt stood x 1 min with RW and up to MOD A. Treatment limited by significnat increased pain wtih mobility, fatigue, and weakness. Recommend contuned PT after d/c to address deficits  Prognosis: Good  REQUIRES PT FOLLOW UP: Yes  Activity Tolerance  Activity Tolerance: Patient limited by endurance; Patient limited by pain     Patient Diagnosis(es): The encounter diagnosis was Left displaced femoral neck fracture ([facility]      has a past medical history of Acute superficial gastritis without hemorrhage, Anastomotic stricture of stomach, Asthma, Atrial fibrillation ([facility] Calculus of gallbladder without cholecystitis without obstruction, Chronic diastolic heart failure (HCC), Chronic rhinosinusitis, Class 2 severe obesity due to excess calories with serious comorbidity and body mass index (BMI) of 36.0 to 36.9 in adult [facility]), COPD (chronic obstructive pulmonary disease) ([facility] E. coli septicemia ([facility] E. coli UTI, Foot drop, right, Fracture of femur ([facility] Gait disorder, Gastric out let obstruction, GERD (gastroesophageal reflux disease), Hallux valgus, acquired, bilateral, Hyperlipidemia, Hypertension, Impaired hearing, Internal hemorrhoids, Lymphedema of both lower extremities, Nausea & vomiting, OA (osteoarthritis) of knee, bilateral, Obesity, MARIAMA on CPAP, Osteoarthritis, Osteoarthritis of lumbar spine, S/P revision of total knee, Septicemia due to E. coli [facility]), Sick sinus syndrome ([facility] Simple chronic bronchitis ([facility] Slow transit constipation, Unspecified sleep apnea, and UTI (urinary tract infection). has a past surgical history that includes Finger amputation (1966); Gastric bypass surgery (1985); Carpal tunnel release; Colonoscopy; Rotator cuff repair; joint replacement (2004 & 2005); Hemorrhoid surgery; pr egd transoral biopsy single/multiple (N/A, 05/25/2018); Upper gastrointestinal endoscopy (08/13/2018); Upper gastrointestinal endoscopy (N/A, 08/13/2018); Upper gastrointestinal endoscopy (N/A, 08/13/2018); Upper gastrointestinal endoscopy (08/16/2018); pr egd flexible foreign body removal (N/A, 08/16/2018); Upper gastrointestinal endoscopy (08/16/2018); Upper gastrointestinal endoscopy (N/A, 10/01/2018); Upper gastrointestinal endoscopy (10/01/2018); Upper gastrointestinal endoscopy (N/A, 11/19/2018); Cystoscopy (01/02/2019); Cystoscopy (N/A, 01/02/2019); Upper gastrointestinal endoscopy (N/A, 10/15/2020); Colonoscopy (N/A, 04/05/2021); Pacemaker insertion (04/09/2021); Upper gastrointestinal endoscopy (N/A, 7/16/2021); hip surgery (Left, 01/30/2022); knee surgery (Left, 01/30/2022); hip surgery (Left, 01/30/2022); Revision Total Knee Arthroplasty (Left, 01/30/2022); Femur fracture surgery (Left, 1/30/2022); and Revision total knee arthroplasty (Left, 1/30/2022). Restrictions  Restrictions/Precautions  Restrictions/Precautions: Weight Bearing,Fall Risk,Contact Precautions  Required Braces or Orthoses?: Yes  Lower Extremity Weight Bearing Restrictions  Left Lower Extremity Weight Bearing: Weight Bearing As Tolerated  Required Braces or Orthoses  Right Lower Extremity Brace: Ankle Foot Orthotics  Left Lower Extremity Brace:  Erika Foot Orthotics  Position Activity Restriction  Other position/activity restrictions: 1/30 L IT fx s/p HWR w/ CMN-fixation; wears BLE AFOs at baseline d/t dropfoot; wound vac to L knee, watch BP and HR  Subjective   General  Response To Previous Treatment: Patient with no complaints from previous session. Family / Caregiver Present: Yes  Subjective  Subjective: Pt resting in bed upon arrival, agreeable to PT, c/o nausea  Pain Screening  Patient Currently in Pain: Yes  Pain Assessment  Pain Assessment: 0-10  Pain Level: 8  Pain Type: Acute pain  Pain Location: Leg  Pain Orientation: Left  Vital Signs  Patient Currently in Pain: Yes  Pre Treatment Pain Screening  Intervention List: Patient able to continue with treatment    Orientation  Orientation  Overall Orientation Status: Within Normal Limits  Cognition      Objective   Bed mobility  Rolling to Left: Minimal assistance  Rolling to Right: Minimal assistance  Supine to Sit: Moderate assistance  Sit to Supine: Moderate assistance  Comment: MOD A for  BLE support  Transfers  Sit to Stand: Moderate Assistance  Stand to sit: Moderate Assistance  Comment: Increased time/effort to perform. Pt motivated to progress. Ambulation  Ambulation?: No (attempted amb toward HOB, however unable to advance either LE)     Balance  Posture: Fair  Sitting - Static: Good;-  Sitting - Dynamic: Fair;+  Standing - Static: Fair;-  Standing - Dynamic: Poor;+  Comments: Pt stood with RW 1 min, requiring MIN/MOD A throughout due to flexedf posture, weakness, increased pain    Exercise  Not tolerated due to pain and nausea     AM-PAC Score  AM-PAC Inpatient Mobility Raw Score : 11 (02/11/22 1538)  AM-PAC Inpatient T-Scale Score : 33.86 (02/11/22 1538)  Mobility Inpatient CMS 0-100% Score: 72.57 (02/11/22 1538)  Mobility Inpatient CMS G-Code Modifier : CL (02/11/22 1538)          Goals  Short term goals  Time Frame for Short term goals: 14 visits  Short term goal 1: Pt will ambulate 125 feet with least restrictive device and CGA to increase functional independence. Short term goal 2: Pt will tolerate a 35 minute therapy session to promote increased endurance.   Short term goal 3: Pt will demonstrate good- standing balance to decrease fall risk. Short term goal 4: Pt will negotiate 5 stairs with no handrails and a SPC to allow the pt to enter prior living arrangements. Short term goal 5: Pt will perform sit<>stand transfer with SBA to increase functional independence.     Plan    Plan  Times per week: 6-7  Times per day: Daily  Current Treatment Recommendations: Strengthening,Transfer Training,Endurance Training,ROM,Balance Training,Gait Training,Functional Mobility Training,Stair training,Safety Education & Training,Home Exercise Program,Equipment Evaluation, Education, & procurement,Patient/Caregiver Education & Training  Safety Devices  Type of devices: Nurse notified,All fall risk precautions in place,Gait belt,Call light within reach,Left in bed  Restraints  Initially in place: No     Therapy Time   Individual Concurrent Group Co-treatment   Time In 1259         Time Out 1331         Minutes 32         Timed Code Treatment Minutes: [facility] , PTA

## 2022-02-11 NOTE — PROGRESS NOTES
Orthopedic Progress Note    Patient:  Obdulia Hernández  YOB: 1945     77 y.o. male    Subjective:  Patient seen and examined  No complaints or concerns  No issue overnight  Denies fever, HA, CP, SOB    Vitals reviewed, afebrile    Objective:   Vitals:    02/11/22 0304   BP: 103/71   Pulse: 70   Resp: 21   Temp: 98.6 °F (37 °C)   SpO2: 100%     Gen: NAD, cooperative     Cardiovascular: regular rate, no dependent edema    Respiratory: No acute respiratory distress    LLE: Prevena removed; was not maintaining suction. Surgical incision with staples in place; no erythema, dehiscence or purulence. Compartments soft. 2+ DP pulse. TA/EHL/FHL/GS motor intact. Deep and Superficial Peroneal/Saphenous/Sural SILT. Recent Labs     02/10/22  0915 02/10/22  0915 02/10/22  1830 02/11/22  0113   WBC 5.5  --   --   --    HGB 7.1*  --   --   --    HCT 22.7*  --   --   --      --   --   --    *   < > 122*  --    K 3.3*   < > 3.4*  --    BUN 13   < > 14  --    CREATININE 0.50*   < > 0.51* 0.76   GLUCOSE 95   < > 104*  --     < > = values in this interval not displayed.       Meds: Herman Servin   See rec for complete list    Impression 77 y.o. male being seen for the following    - Left interprosthetic femur fracture s/p IMN, DOS 1/30/22    Plan    - Prevena removed; OK to change soft dressings prn  - WBAT to LLE  - Keflex 500 QID; end date 2/16/22  - Encourage mobilization with PT/OT  - Medical management per primary  - No further plans for surgery per ortho; OK to dc when cleared    Electronically signed by Dwayne Hannon DO 5:36 AM 2/11/2022

## 2022-02-11 NOTE — PROGRESS NOTES
PATIENT REFUSES TO WEAR BIPAP     [x] Risks and benefits explained to patient   [x] Patient refuses to wear Bipap stating \"No thanks, I don't like the hospital machine\"  [x] Patient verbalizes understanding of information presented.

## 2022-02-11 NOTE — PROGRESS NOTES
Notified Trauma of low potassium, asked for replacement, POC drawn by respiratory, notified Trauma of results and hgb level.  Waiting for a response    Paged trauma again regarding hgb and fluids    Per Trauma continue fluids and will recheck hgb and hct with morning labs    Notified trauma of potassium

## 2022-02-11 NOTE — CARE COORDINATION
Transitional planning:  Nursing rounds: met with unit RN to inquire about pt's diarrhea. She states it is brown and loose, not Cdif appearing. Pt had loose stools last night, no vomiting.     9140 Faxed demographic, medical necessity and transportation form to [address], \"will call. \"   SULTANA Harris with Dr. Kathy Wellsiver team and notified of above. HENS &  AVS completed and placed in transportation packet. MARS report placed in packet, too. Steven responded, need to ensure electrolytes are WNL. [address] called from [phone]. States call her when transportation is set up.     [employer] flight network. Can transport tomorrow at 9 am.   [address] and notified of transport time tomorrow. She can accept. Nurse report number is [phone].   [phone] Needs discharge order. SULTANA Harris MD. Has transport at 9 am.

## 2022-02-11 NOTE — PLAN OF CARE
Problem: Airway Clearance - Ineffective  Goal: Achieve or maintain patent airway  Outcome: Ongoing     Problem: Gas Exchange - Impaired  Goal: Absence of hypoxia  Outcome: Ongoing  Goal: Promote optimal lung function  Outcome: Ongoing     Problem: Breathing Pattern - Ineffective  Goal: Ability to achieve and maintain a regular respiratory rate  Outcome: Ongoing     Problem: Body Temperature -  Risk of, Imbalanced  Goal: Ability to maintain a body temperature within defined limits  Outcome: Ongoing  Goal: Will regain or maintain usual level of consciousness  Outcome: Ongoing  Goal: Complications related to the disease process, condition or treatment will be avoided or minimized  Outcome: Ongoing     Problem: Isolation Precautions - Risk of Spread of Infection  Goal: Prevent transmission of infection  Outcome: Ongoing     Problem: Nutrition Deficits  Goal: Optimize nutritional status  Outcome: Ongoing     Problem: Risk for Fluid Volume Deficit  Goal: Maintain normal heart rhythm  Outcome: Ongoing  Goal: Maintain absence of muscle cramping  Outcome: Ongoing  Goal: Maintain normal serum potassium, sodium, calcium, phosphorus, and pH  Outcome: Ongoing     Problem: Loneliness or Risk for Loneliness  Goal: Demonstrate positive use of time alone when socialization is not possible  Outcome: Ongoing     Problem: Fatigue  Goal: Verbalize increase energy and improved vitality  Outcome: Ongoing     Problem: Patient Education: Go to Patient Education Activity  Goal: Patient/Family Education  Outcome: Ongoing     Problem: Pain:  Description: Pain management should include both nonpharmacologic and pharmacologic interventions.   Goal: Pain level will decrease  Description: Pain level will decrease  Outcome: Ongoing  Goal: Control of acute pain  Description: Control of acute pain  Outcome: Ongoing  Goal: Control of chronic pain  Description: Control of chronic pain  Outcome: Ongoing     Problem: Skin Integrity:  Goal: Will show no infection signs and symptoms  Description: Will show no infection signs and symptoms  Outcome: Ongoing  Goal: Absence of new skin breakdown  Description: Absence of new skin breakdown  Outcome: Ongoing     Problem: Falls - Risk of:  Goal: Will remain free from falls  Description: Will remain free from falls  Outcome: Ongoing  Goal: Absence of physical injury  Description: Absence of physical injury  Outcome: Ongoing     Problem: Nutrition  Goal: Optimal nutrition therapy  Outcome: Ongoing

## 2022-02-12 VITALS
RESPIRATION RATE: 15 BRPM | WEIGHT: 201.1 LBS | HEART RATE: 70 BPM | SYSTOLIC BLOOD PRESSURE: 100 MMHG | HEIGHT: 70 IN | DIASTOLIC BLOOD PRESSURE: 65 MMHG | OXYGEN SATURATION: 96 % | TEMPERATURE: 98.2 F | BODY MASS INDEX: 28.79 KG/M2

## 2022-02-12 LAB
ANION GAP SERPL CALCULATED.3IONS-SCNC: 9 MMOL/L (ref 9–17)
BUN BLDV-MCNC: 9 MG/DL (ref 8–23)
BUN/CREAT BLD: ABNORMAL (ref 9–20)
CALCIUM SERPL-MCNC: 8.2 MG/DL (ref 8.6–10.4)
CHLORIDE BLD-SCNC: 94 MMOL/L (ref 98–107)
CO2: 23 MMOL/L (ref 20–31)
CREAT SERPL-MCNC: 0.48 MG/DL (ref 0.7–1.2)
GFR AFRICAN AMERICAN: >60 ML/MIN
GFR NON-AFRICAN AMERICAN: >60 ML/MIN
GFR SERPL CREATININE-BSD FRML MDRD: ABNORMAL ML/MIN/{1.73_M2}
GFR SERPL CREATININE-BSD FRML MDRD: ABNORMAL ML/MIN/{1.73_M2}
GLUCOSE BLD-MCNC: 105 MG/DL (ref 70–99)
POTASSIUM SERPL-SCNC: 3.4 MMOL/L (ref 3.7–5.3)
SODIUM BLD-SCNC: 126 MMOL/L (ref 135–144)

## 2022-02-12 PROCEDURE — 6370000000 HC RX 637 (ALT 250 FOR IP): Performed by: NURSE PRACTITIONER

## 2022-02-12 PROCEDURE — 6370000000 HC RX 637 (ALT 250 FOR IP): Performed by: STUDENT IN AN ORGANIZED HEALTH CARE EDUCATION/TRAINING PROGRAM

## 2022-02-12 PROCEDURE — 36415 COLL VENOUS BLD VENIPUNCTURE: CPT

## 2022-02-12 PROCEDURE — 2580000003 HC RX 258: Performed by: STUDENT IN AN ORGANIZED HEALTH CARE EDUCATION/TRAINING PROGRAM

## 2022-02-12 PROCEDURE — 80048 BASIC METABOLIC PNL TOTAL CA: CPT

## 2022-02-12 PROCEDURE — 6370000000 HC RX 637 (ALT 250 FOR IP): Performed by: HEALTH CARE PROVIDER

## 2022-02-12 RX ADMIN — BUDESONIDE AND FORMOTEROL FUMARATE DIHYDRATE 2 PUFF: 160; 4.5 AEROSOL RESPIRATORY (INHALATION) at 07:49

## 2022-02-12 RX ADMIN — CEPHALEXIN 500 MG: 500 CAPSULE ORAL at 07:48

## 2022-02-12 RX ADMIN — PANCRELIPASE 24000 UNITS: 24000; 76000; 120000 CAPSULE, DELAYED RELEASE PELLETS ORAL at 07:48

## 2022-02-12 RX ADMIN — PANTOPRAZOLE SODIUM 40 MG: 40 TABLET, DELAYED RELEASE ORAL at 07:49

## 2022-02-12 RX ADMIN — SODIUM CHLORIDE, PRESERVATIVE FREE 10 ML: 5 INJECTION INTRAVENOUS at 07:50

## 2022-02-12 RX ADMIN — TAMSULOSIN HYDROCHLORIDE 0.4 MG: 0.4 CAPSULE ORAL at 07:49

## 2022-02-12 RX ADMIN — FERROUS SULFATE TAB EC 325 MG (65 MG FE EQUIVALENT) 325 MG: 325 (65 FE) TABLET DELAYED RESPONSE at 07:48

## 2022-02-12 RX ADMIN — ROPINIROLE HYDROCHLORIDE 0.25 MG: 0.25 TABLET, FILM COATED ORAL at 07:49

## 2022-02-12 RX ADMIN — CARBOXYMETHYLCELLULOSE SODIUM 1 DROP: 10 GEL OPHTHALMIC at 07:53

## 2022-02-12 RX ADMIN — MIDODRINE HYDROCHLORIDE 5 MG: 5 TABLET ORAL at 04:45

## 2022-02-12 RX ADMIN — SODIUM CHLORIDE TAB 1 GM 1 G: 1 TAB at 07:49

## 2022-02-12 ASSESSMENT — PAIN SCALES - GENERAL: PAINLEVEL_OUTOF10: 5

## 2022-02-12 NOTE — PLAN OF CARE
Problem: Airway Clearance - Ineffective  Goal: Achieve or maintain patent airway  2/12/2022 0729 by Lili Chan RN  Outcome: Completed  2/12/2022 0038 by Arthur De La O RN  Outcome: Ongoing  2/11/2022 2135 by Jp Rodriguez RN  Outcome: Ongoing     Problem: Gas Exchange - Impaired  Goal: Absence of hypoxia  2/12/2022 0729 by Lili Chan RN  Outcome: Completed  2/12/2022 0038 by Arthur De La O RN  Outcome: Ongoing  2/11/2022 2135 by Jp Rodriguez RN  Outcome: Ongoing  Goal: Promote optimal lung function  2/12/2022 0729 by Lili Chan RN  Outcome: Completed  2/12/2022 0038 by Arthur De La O RN  Outcome: Ongoing  2/11/2022 2135 by Jp Rodriguez RN  Outcome: Ongoing     Problem: Breathing Pattern - Ineffective  Goal: Ability to achieve and maintain a regular respiratory rate  2/12/2022 0729 by Lili Chan RN  Outcome: Completed  2/12/2022 0038 by Arthur De La O RN  Outcome: Ongoing  2/11/2022 2135 by Jp Rodriguez RN  Outcome: Ongoing     Problem:  Body Temperature -  Risk of, Imbalanced  Goal: Ability to maintain a body temperature within defined limits  2/12/2022 0729 by Lili Chan RN  Outcome: Completed  2/12/2022 0038 by Arthur De La O RN  Outcome: Ongoing  2/11/2022 2135 by Jp Rodriguez RN  Outcome: Ongoing  Goal: Will regain or maintain usual level of consciousness  2/12/2022 0729 by Lili Chan RN  Outcome: Completed  2/12/2022 0038 by Arthur De La O RN  Outcome: Ongoing  2/11/2022 2135 by Jp Rodriguez RN  Outcome: Ongoing  Goal: Complications related to the disease process, condition or treatment will be avoided or minimized  2/12/2022 0729 by Lili Chan RN  Outcome: Completed  2/12/2022 0038 by Arthur De La O RN  Outcome: Ongoing  2/11/2022 2135 by Jp Rodriguez RN  Outcome: Ongoing     Problem: Isolation Precautions - Risk of Spread of Infection  Goal: Prevent transmission of infection  2/12/2022 0729 by Lili Chan RN  Outcome: Completed  2/12/2022 0038 by Tammy Jean RN  Outcome: Ongoing  2/11/2022 2135 by Colton Galindo RN  Outcome: Ongoing     Problem: Nutrition Deficits  Goal: Optimize nutritional status  2/12/2022 0729 by Talon Hoover RN  Outcome: Completed  2/12/2022 0038 by Tammy Jean RN  Outcome: Ongoing  2/11/2022 2135 by Colton Galindo RN  Outcome: Ongoing     Problem: Risk for Fluid Volume Deficit  Goal: Maintain normal heart rhythm  2/12/2022 0729 by Talon Hoover RN  Outcome: Completed  2/12/2022 0038 by Tammy Jean RN  Outcome: Ongoing  2/11/2022 2135 by Colton Galindo RN  Outcome: Ongoing  Goal: Maintain absence of muscle cramping  2/12/2022 0729 by Talon Hoover RN  Outcome: Completed  2/12/2022 0038 by Tammy Jean RN  Outcome: Ongoing  2/11/2022 2135 by Colton Galindo RN  Outcome: Ongoing  Goal: Maintain normal serum potassium, sodium, calcium, phosphorus, and pH  2/12/2022 0729 by Talon Hoover RN  Outcome: Completed  2/12/2022 0038 by Tammy Jean RN  Outcome: Ongoing  2/11/2022 2135 by Colton Galindo RN  Outcome: Ongoing     Problem: Loneliness or Risk for Loneliness  Goal: Demonstrate positive use of time alone when socialization is not possible  2/12/2022 0729 by Talon Hoover RN  Outcome: Completed  2/12/2022 0038 by Tammy Jean RN  Outcome: Ongoing  2/11/2022 2135 by Colton Galindo RN  Outcome: Ongoing     Problem: Fatigue  Goal: Verbalize increase energy and improved vitality  2/12/2022 0729 by Talon Hoover RN  Outcome: Completed  2/12/2022 0038 by Tammy Jean RN  Outcome: Ongoing  2/11/2022 2135 by Colton Galindo RN  Outcome: Ongoing     Problem: Patient Education: Go to Patient Education Activity  Goal: Patient/Family Education  2/12/2022 0729 by Talon Hoover RN  Outcome: Completed  2/12/2022 0038 by Tammy Jean RN  Outcome: Ongoing  2/11/2022 2135 by Colton Galindo RN  Outcome: Ongoing     Problem: Pain:  Goal: Pain level will decrease  Description: Pain level will decrease  2/12/2022 0729 by Giulia Ayers RN  Outcome: Completed  2/12/2022 0038 by Yoni Smith RN  Outcome: Ongoing  2/11/2022 2135 by Diana Hou RN  Outcome: Ongoing  Goal: Control of acute pain  Description: Control of acute pain  2/12/2022 0729 by Giulia Ayers RN  Outcome: Completed  2/12/2022 0038 by Yoni Smith RN  Outcome: Ongoing  2/11/2022 2135 by Diana Hou RN  Outcome: Ongoing  Goal: Control of chronic pain  Description: Control of chronic pain  2/12/2022 0729 by Giulia Ayers RN  Outcome: Completed  2/12/2022 0038 by Yoni Smith RN  Outcome: Ongoing  2/11/2022 2135 by Diana Hou RN  Outcome: Ongoing     Problem: Skin Integrity:  Goal: Will show no infection signs and symptoms  Description: Will show no infection signs and symptoms  2/12/2022 0729 by Giulia Ayers RN  Outcome: Completed  2/12/2022 0038 by Yoni Smith RN  Outcome: Ongoing  2/11/2022 2135 by Diana Hou RN  Outcome: Ongoing  Goal: Absence of new skin breakdown  Description: Absence of new skin breakdown  2/12/2022 0729 by Giulia Ayers RN  Outcome: Completed  2/12/2022 0038 by Yoni Smith RN  Outcome: Ongoing  2/11/2022 2135 by Diana Hou RN  Outcome: Ongoing     Problem: Falls - Risk of:  Goal: Will remain free from falls  Description: Will remain free from falls  2/12/2022 0729 by Giulia Ayers RN  Outcome: Completed  2/12/2022 0038 by Yoni Smith RN  Outcome: Ongoing  2/11/2022 2135 by Diana Hou RN  Outcome: Ongoing  Goal: Absence of physical injury  Description: Absence of physical injury  2/12/2022 0729 by Guilia Ayers RN  Outcome: Completed  2/12/2022 0038 by Yoni Smith RN  Outcome: Ongoing  2/11/2022 2135 by Diana Hou RN  Outcome: Ongoing     Problem: Nutrition  Goal: Optimal nutrition therapy  2/12/2022 0729 by Giulia Armen, RN  Outcome: Completed  2/12/2022 0038 by Yoni Smith RN  Outcome: Ongoing  2/11/2022 2135 by Clover Oppenheim, RN  Outcome: Ongoing

## 2022-02-12 NOTE — PROGRESS NOTES
Progress Note    Patient:  Sinai Vicente  YOB: 1945     77 y.o. male    Subjective:  Patient seen and examined at bedside this morning. No new complaints or concerns per patient this morning. No acute issues overnight per nursing. Per patient, should be discharged today. Transport arrangements have been made. Denies: fever/chills, HA, CP, SOB, N/V, dysuria, or numbness/tingling in extremities. Objective:   Vitals:    02/12/22 0327   BP: (!) 89/53   Pulse: 70   Resp: 15   Temp: 98.7 °F (37.1 °C)   SpO2: 96%     Gen: NAD, cooperative   Cardiovascular: Regular rate  Respiratory: no audible wheezing, symmetrical chest expansion   MSK: Dressing over anterior knee incision c/d/i. No surrounding erythema or ecchymoses present. Superficial abrasion over anterior tibia. Sensation intact to light touch. DP pulse 2+. Recent Labs     02/11/22  0749 02/11/22  1605 02/12/22  0357   WBC 6.1  --   --    HGB 7.1*  --   --    HCT 22.1*  --   --      --   --    *   < > 126*   K 3.5*   < > 3.4*   BUN 11   < > 9   CREATININE 0.56*   < > 0.48*   GLUCOSE 98   < > 105*    < > = values in this interval not displayed. Meds:  Xarelto, Keflex  See rec for complete list    Impression: 77 y.o. male being seen for the following     - Left interprosthetic femur fracture s/p IMN, DOS 1/30/22     Plan     - Dressings on. OK to change soft dressings prn  - WBAT to LLE  - Keflex 500 QID; end date 2/16/22.  Ensure patient discharged with script for remaining days of Keflex   - Encourage mobilization with PT/OT  - Medical management per primary  - No further plans for surgery per ortho; OK for discharge     Alisha Schwartz, DO  PGY-2 Orthopedic Surgery  7:00 AM 2/12/2022

## 2022-02-12 NOTE — PLAN OF CARE
Problem: Airway Clearance - Ineffective  Goal: Achieve or maintain patent airway  2/11/2022 2135 by Stella Mahcuca RN  Outcome: Ongoing  2/11/2022 0939 by Nelsy Wilson RN  Outcome: Ongoing     Problem: Gas Exchange - Impaired  Goal: Absence of hypoxia  2/11/2022 2135 by Stella Machuca RN  Outcome: Ongoing  2/11/2022 0939 by Nelsy Wilson RN  Outcome: Ongoing  Goal: Promote optimal lung function  2/11/2022 2135 by Stella Machuca RN  Outcome: Ongoing  2/11/2022 0939 by Nelsy Wilson RN  Outcome: Ongoing     Problem: Breathing Pattern - Ineffective  Goal: Ability to achieve and maintain a regular respiratory rate  2/11/2022 2135 by Stella Machuca RN  Outcome: Ongoing  2/11/2022 0939 by Nelsy Wilson RN  Outcome: Ongoing     Problem:  Body Temperature -  Risk of, Imbalanced  Goal: Ability to maintain a body temperature within defined limits  2/11/2022 2135 by Stella Machuca RN  Outcome: Ongoing  2/11/2022 0939 by Nelsy Wilson RN  Outcome: Ongoing  Goal: Will regain or maintain usual level of consciousness  2/11/2022 2135 by Stella Machuca RN  Outcome: Ongoing  2/11/2022 0939 by Nelsy Wilson RN  Outcome: Ongoing  Goal: Complications related to the disease process, condition or treatment will be avoided or minimized  2/11/2022 2135 by Stella Machuca RN  Outcome: Ongoing  2/11/2022 0939 by Nelsy Wilson RN  Outcome: Ongoing     Problem: Isolation Precautions - Risk of Spread of Infection  Goal: Prevent transmission of infection  2/11/2022 2135 by Stella Machuca RN  Outcome: Ongoing  2/11/2022 0939 by Nelsy Wilson RN  Outcome: Ongoing     Problem: Nutrition Deficits  Goal: Optimize nutritional status  2/11/2022 2135 by Stella Machuca RN  Outcome: Ongoing  2/11/2022 0939 by Nelsy Wilson RN  Outcome: Ongoing     Problem: Risk for Fluid Volume Deficit  Goal: Maintain normal heart rhythm  2/11/2022 2135 by Stella Machuca RN  Outcome: Ongoing  2/11/2022 0939 by Mesfin Nobles RN  Outcome: Ongoing  Goal: Maintain absence of muscle cramping  2/11/2022 2135 by Gabby Hdz RN  Outcome: Ongoing  2/11/2022 0939 by Mesfin Nobles RN  Outcome: Ongoing  Goal: Maintain normal serum potassium, sodium, calcium, phosphorus, and pH  2/11/2022 2135 by Gabby Hdz RN  Outcome: Ongoing  2/11/2022 0939 by Mesfin Nobles RN  Outcome: Ongoing     Problem: Loneliness or Risk for Loneliness  Goal: Demonstrate positive use of time alone when socialization is not possible  2/11/2022 2135 by Gabby Hdz RN  Outcome: Ongoing  2/11/2022 0939 by Mesfin Nobles RN  Outcome: Ongoing     Problem: Fatigue  Goal: Verbalize increase energy and improved vitality  2/11/2022 2135 by Gabby Hdz RN  Outcome: Ongoing  2/11/2022 0939 by Mesfin Nobles RN  Outcome: Ongoing     Problem: Patient Education: Go to Patient Education Activity  Goal: Patient/Family Education  2/11/2022 2135 by Gabby Hdz RN  Outcome: Ongoing  2/11/2022 0939 by Mesfin Nobles RN  Outcome: Ongoing     Problem: Pain:  Goal: Pain level will decrease  Description: Pain level will decrease  2/11/2022 2135 by Gabby Hdz RN  Outcome: Ongoing  2/11/2022 0939 by Mesfin Nobles RN  Outcome: Ongoing  Goal: Control of acute pain  Description: Control of acute pain  2/11/2022 2135 by Gabby Hdz RN  Outcome: Ongoing  2/11/2022 0939 by Mesfin Nobles RN  Outcome: Ongoing  Goal: Control of chronic pain  Description: Control of chronic pain  2/11/2022 2135 by Gabby Hdz RN  Outcome: Ongoing  2/11/2022 0939 by Mesfin Nobles RN  Outcome: Ongoing     Problem: Skin Integrity:  Goal: Will show no infection signs and symptoms  Description: Will show no infection signs and symptoms  2/11/2022 2135 by Gabby Hdz RN  Outcome: Ongoing  2/11/2022 0939 by Mesfin Nobles RN  Outcome: Ongoing  Goal: Absence of new skin breakdown  Description: Absence of new skin breakdown  2/11/2022 2135 by Valerie Rowley RN  Outcome: Ongoing  2/11/2022 0939 by Yamileth Regan RN  Outcome: Ongoing     Problem: Falls - Risk of:  Goal: Will remain free from falls  Description: Will remain free from falls  2/11/2022 2135 by Valerie Rowley RN  Outcome: Ongoing  2/11/2022 0939 by Yamileth Regan RN  Outcome: Ongoing  Goal: Absence of physical injury  Description: Absence of physical injury  2/11/2022 2135 by Valerie Rowley RN  Outcome: Ongoing  2/11/2022 0939 by Yamileth Regan RN  Outcome: Ongoing     Problem: Nutrition  Goal: Optimal nutrition therapy  2/11/2022 2135 by Valerie Rowley RN  Outcome: Ongoing  2/11/2022 0939 by Yamileth Regan RN  Outcome: Ongoing

## 2022-02-12 NOTE — FLOWSHEET NOTE
Pt refused bypap. No complications and no signs of distress at this time. Advised pt to call if he changes his mind. Will continue to monitor.

## 2022-02-22 NOTE — DISCHARGE SUMMARY
DISCHARGE SUMMARY:    PATIENT NAME:  Mike Gillette  YOB: 1945  MEDICAL RECORD NO. [id number]  PRIMARY CARE PHYSICIAN: Trinna Baumgarten, APRN - CNP  ADMIT DATE: 1/29/2022  DISPOSITION:  SNF  DISCHARGE DATE:  2/11/2022  ADMITTING DIAGNOSIS:  Left femur fracture     DIAGNOSIS:   Patient Active Problem List   Diagnosis    Chronic atrial fibrillation ([address]    Dyslipidemia    Gastroesophageal reflux disease without esophagitis    Osteoarthritis of lumbar spine    OA (osteoarthritis) of knee, bilateral    Foot drop, right    Hallux valgus, acquired, bilateral    Hearing impairment    Essential hypertension    Slow transit constipation    MARIAMA on CPAP    COPD (chronic obstructive pulmonary disease) (HCC)    Chronic diastolic heart failure (HCC)    History of bariatric surgery including banding leading to esophageal stricture and aspiration    BPH (benign prostatic hyperplasia)    Myalgia    Atrial fibrillation with slow ventricular response ([address]    Pacemaker pocket hematoma, initial encounter    Pulmonary hypertension ([address]    Presence of permanent cardiac pacemaker    Fall at home, initial encounter    Left displaced femoral neck fracture ([address]    Retained orthopedic hardware    COVID-19    Elevated C-reactive protein (CRP)       CONSULTANTS: orthopedic surgery    PROCEDURES: IMN of left interprosthetic femur fx (1/30/2022)      HOSPITAL COURSE:   Mike Gillette is a 77 y.o. male who was admitted on 1/29/2022 s/p fall from [facility] after trying to reach his walker. This resulted in a left interprosthetic femur fx. He underwent left femur fracture IMN on 1/30/2022. He received a course of Keflex postop and was discharged on remainder on course (end date 2/16/2022). A prevena wound vac was used while inpatient and incision was healing well so it was removed prior to discharge. Labs and imaging were followed daily.  The patient had some hyponatremia while inpatient which resolved with salt Male  Birth   Age          77 year(s)  Race                           Room Number  2006        Height:                     70 inch, 177.8 cm   Corporate ID [id number]    Weight:                     170 pounds, 77.1 kg  #   Patient Acct [de-identified]   BSA:          1.95 m^2      BMI:      24.39  #                                                              kg/m^2   MR #         [id number]     Sonographer                 Meño Rg   Accession #  [id number]  Interpreting Physician      Kathie Seay   Fellow                   Referring Nurse                           Practitioner   Interpreting             Referring Physician         Vernon Leach DO  Fellow  Type of Study   TTE procedure:2D Echocardiogram, M-Mode, Doppler, Color Doppler. Procedure Date Date: 01/29/2022 Start: 07:21 AM Study Location: [facility] Technical Quality: Adequate visualization Indications:Atrial fibrillation, Congestive heart failure and Preop cardiac evaluation. History / Tech. Comments: Echo done at patient bedside. Procedure explained to patient. HTN, MARIAMA, COPD Patient Status: Inpatient Height: 70 inches Weight: 170 pounds BSA: 1.95 m^2 BMI: 24.39 kg/m^2 Allergies   - *Unlisted:(Darvocet, propoxyphene). - Percocet. CONCLUSIONS Summary Left ventricle is normal in size. Global left ventricular systolic function is difficult to assess due to heart rate and rhythm but appears normal. Visually estimated EF 50%. Abnormal septal motion; may be due to pacemaker. Mild concentric left ventricular hypertrophy. No significant valvular regurgitation or stenosis seen.  Signature ----------------------------------------------------------------------------  Electronically signed by Jennifer Hernandez(Sonographer) on 01/29/2022  08:04 AM ---------------------------------------------------------------------------- ----------------------------------------------------------------------------  Electronically signed by Kathie Seay(Interpreting physician) on  2022 12:10 PM ---------------------------------------------------------------------------- FINDINGS Left Atrium Left atrium is moderately dilated. Left Ventricle Left ventricle is normal in size. Global left ventricular systolic function is difficult to assess due to heart rate and rhythm but appears normal. Visually estimated EF 50%. Abnormal septal motion; may be due to pacemaker. Mild concentric left ventricular hypertrophy. Right Atrium Right atrial dilatation. Right Ventricle Normal right ventricular size and function. Pacemaker / ICD lead seen in right ventricle. TAPSE value of 1.07cm noted. Mitral Valve Normal mitral valve structure. Trivial mitral regurgitation. Aortic Valve Aortic valve appears trileaflet. Aortic valve is mildly sclerotic but opens well. No aortic insufficiency. Tricuspid Valve Normal tricuspid valve structure and function. No tricuspid regurgitation. Pulmonic Valve The pulmonic valve is normal in structure. No pulmonic insufficiency. Pericardial Effusion Small posterior pericardial effusion. Small anterior fat pad vs. pericardial effusion. Miscellaneous IVC not well visualized.  M-mode / 2D Measurements & Calculations:   LVIDd:4 cm(3.7 - 5.6 cm)         Diastolic IVAKHX:79.7 ml  RRUUS:3.7 cm(2.2 - 4.0 cm)       Systolic HRUPYS:48.1 ml  HUE.1 cm(0.6 - 1.1 cm)        LA Dimension: 4.1 cm(1.9 - 4.0 cm)  LVPWd:1.3 cm(0.6 - 1.1 cm)       LA volume/Index: 91.27 ml /47m^2  Fractional Shortenin.5 %     LVOT:1.9 cm  Calculated LVEF (%): 25.06 %     RVDd:3.9 cm   Mitral:                                 Aortic   Valve Area (P1/2-Time): 4.89 cm^2       Peak Velocity: 1.26 m/s  Peak E-Wave: 0.80 m/s                   Mean Velocity: 0.82 m/s                                          Peak Gradient: 6.35 mmHg  Peak Gradient: 2.54 mmHg                Mean Gradient: 3 mmHg  Mean Gradient: 1 mmHg  Deceleration Time: 153 msec  P1/2t: 45 msec Area (continuity): 2.11 cm^2                                          AV VTI: 25.1 cm   Area (continuity): 2.27 cm^2  Mean Velocity: 0.51 m/s  Diastology / Tissue Doppler Lateral Wall E' velocity:0.06 m/s Lateral Wall E/E':14.4    XR FEMUR LEFT (MIN 2 VIEWS)    Result Date: 1/28/2022  EXAMINATION: 5 XRAY VIEWS OF THE LEFT FEMUR 1/28/2022 10:52 pm COMPARISON: None. HISTORY: ORDERING SYSTEM PROVIDED HISTORY: fall TECHNOLOGIST PROVIDED HISTORY: fall Reason for Exam: Fall, bilateral hip pain, Majority of pain in left hip and down left leg FINDINGS: There is an intertrochanteric fracture of the proximal femur. There is an intramedullary lamberto fixing a remote healed femoral fracture. There is partial visualization of a left knee arthroplasty without complication. The visualized bony pelvis appears intact. The surrounding soft tissues are unremarkable. Intertrochanteric fracture of the proximal left femur. XR KNEE LEFT (3 VIEWS)    Result Date: 1/28/2022  EXAMINATION: THREE XRAY VIEWS OF THE LEFT KNEE 1/28/2022 10:52 pm COMPARISON: None. HISTORY: ORDERING SYSTEM PROVIDED HISTORY: fall TECHNOLOGIST PROVIDED HISTORY: fall Reason for Exam: Fall, bilateral hip pain, Majority of pain in left hip and down left leg FINDINGS: There is a left knee arthroplasty without evidence for complication. There is a femoral intramedullary lamberto fixing a remote healed femoral fracture. There is no acute osseous abnormality. The surrounding soft tissues are unremarkable. No acute osseous or soft tissue abnormality. XR ANKLE LEFT (MIN 3 VIEWS)    Result Date: 2/3/2022  EXAMINATION: THREE XRAY VIEWS OF THE LEFT ANKLE 1/29/2022 7:59 pm COMPARISON: None. HISTORY: ORDERING SYSTEM PROVIDED HISTORY: left ankle pain TECHNOLOGIST PROVIDED HISTORY: left ankle pain FINDINGS: No acute osseous abnormality. The talar dome is intact. Ankle mortise is aligned. Diffuse osteopenia. Anterolateral soft tissue swelling. Vascular calcifications noted. 1. Anterolateral soft tissue swelling. 2. No acute osseous abnormality of the left ankle. 3. Diffuse osteopenia. CT HEAD WO CONTRAST    Result Date: 1/29/2022  EXAMINATION: CT OF THE HEAD WITHOUT CONTRAST  1/29/2022 12:33 am TECHNIQUE: CT of the head was performed without the administration of intravenous contrast. Dose modulation, iterative reconstruction, and/or weight based adjustment of the mA/kV was utilized to reduce the radiation dose to as low as reasonably achievable. COMPARISON: CT brain performed 04/07/2021. HISTORY: ORDERING SYSTEM PROVIDED HISTORY: fall on xeralto TECHNOLOGIST PROVIDED HISTORY: fall on xeralto Decision Support Exception - unselect if not a suspected or confirmed emergency medical condition->Emergency Medical Condition (MA) Reason for Exam: S/P Fall - On xeralto. FINDINGS: BRAIN/VENTRICLES: There is no acute intracranial hemorrhage, mass effect, or midline shift. There is satisfactory overall gray-white matter differentiation. The ventricular structures are symmetric and unremarkable. The infratentorial structures are unremarkable. ORBITS: The visualized portion of the orbits demonstrate no acute abnormality. SINUSES: The visualized paranasal sinuses and mastoid air cells demonstrate no acute abnormality. SOFT TISSUES/SKULL:  No acute abnormality of the visualized skull or soft tissues. No acute intracranial abnormality. CT CERVICAL SPINE WO CONTRAST    Result Date: 1/29/2022  EXAMINATION: CT OF THE CERVICAL SPINE WITHOUT CONTRAST 1/29/2022 1:36 am TECHNIQUE: CT of the cervical spine was performed without the administration of intravenous contrast. Multiplanar reformatted images are provided for review. Dose modulation, iterative reconstruction, and/or weight based adjustment of the mA/kV was utilized to reduce the radiation dose to as low as reasonably achievable. COMPARISON: None.  HISTORY: ORDERING SYSTEM PROVIDED HISTORY: Fall - trauma TECHNOLOGIST PROVIDED HISTORY: Fall - trauma Decision Support Exception - unselect if not a suspected or confirmed emergency medical condition->Emergency Medical Condition (MA) Reason for Exam: S/P Fall - Trauma. ** C-collar applied ** FINDINGS: BONES/ALIGNMENT: The vertebral body heights are maintained. The facet joints are aligned. There is no acute fracture or malalignment. There is no spondylolisthesis. DEGENERATIVE CHANGES: There is multilevel degenerative disc disease with disc space narrowing most pronounced at the C7-T1 level. There is no canal stenosis or significant bony foraminal narrowing. SOFT TISSUES: The posterior paraspinal soft tissues are unremarkable. Multiple nodules are seen throughout the right thyroid lobe. The lung apices are without acute process. No acute fracture or malalignment of the cervical spine. Multiple nodules within the right thyroid lobe and correlation with nonemergent sonography is recommended. XR SHOULDER LEFT (MIN 2 VIEWS)    Result Date: 1/29/2022  EXAMINATION: 3 XRAY VIEWS OF THE LEFT SHOULDER 1/29/2022 12:48 am COMPARISON: None. HISTORY: ORDERING SYSTEM PROVIDED HISTORY: Trauma/Fracture TECHNOLOGIST PROVIDED HISTORY: Ap/scap y/axillary. Thank you Trauma/Fracture Reason for Exam: Fall FINDINGS: There is no acute osseous abnormality. There is hardware within the left humeral head from prior rotator cuff repair. There is degenerative change of the left shoulder joint spaces. The surrounding soft tissues are unremarkable. The visualized left lung is without acute process. No acute osseous or soft tissue abnormality. Degenerative change of the left shoulder joint spaces with evidence of prior rotator cuff repair. XR CHEST PORTABLE    Result Date: 1/29/2022  EXAMINATION: ONE XRAY VIEW OF THE CHEST 1/29/2022 3:14 am COMPARISON: Chest radiograph performed 01/07/2022.  HISTORY: ORDERING SYSTEM PROVIDED HISTORY: pre op TECHNOLOGIST PROVIDED HISTORY: pre op Reason for Exam: pre-op   upright port FINDINGS: There is no acute consolidation or effusion. There is no pneumothorax. The mediastinal structures are stable with stable cardiac leads. The upper abdomen is unremarkable. The extrathoracic soft tissues are unremarkable. No acute cardiopulmonary process. CT CHEST ABDOMEN PELVIS W CONTRAST    Result Date: 1/29/2022  EXAMINATION: CT OF THE CHEST, ABDOMEN, AND PELVIS WITH CONTRAST 1/29/2022 1:36 am TECHNIQUE: CT of the chest, abdomen and pelvis was performed with the administration of intravenous contrast. Multiplanar reformatted images are provided for review. Dose modulation, iterative reconstruction, and/or weight based adjustment of the mA/kV was utilized to reduce the radiation dose to as low as reasonably achievable. COMPARISON: None HISTORY: ORDERING SYSTEM PROVIDED HISTORY: fall - trauma TECHNOLOGIST PROVIDED HISTORY: fall - trauma Decision Support Exception - unselect if not a suspected or confirmed emergency medical condition->Emergency Medical Condition (MA) Reason for Exam: S/P Fall - TRAUMA  **HIP PAIN** FINDINGS: Chest: Mediastinum: Soft tissues of the thoracic inlet are unremarkable. The thoracic aorta is normal in caliber. The main pulmonary artery is normal in caliber. There is a trace pericardial effusion. There is no mediastinal or hilar adenopathy. Lungs/pleura: There is atelectasis versus scarring within the lungs bilaterally. There is no effusion. There is no pneumothorax. The tracheobronchial tree is patent. Soft Tissues/Bones: The extrathoracic soft tissues are unremarkable. There is no axillary adenopathy. There is no acute osseous abnormality. Abdomen/Pelvis: Organs: The liver and spleen are normal size and overall attenuation. There are stones within the gallbladder. The pancreas is atrophic. The adrenal glands are unremarkable. There are bilateral renal cysts. The ureters are not dilated.   The urinary bladder is unremarkable. GI/Bowel: The stomach is grossly unremarkable. Loops of small bowel are normal in caliber without evidence for obstruction. The colon contains air and fecal residue. There are scattered uncomplicated diverticula involving the descending colon. There is no free air or free fluid. Pelvis: Phleboliths are seen in the pelvis. The prostate gland and seminal vesicles are otherwise unremarkable. Peritoneum/Retroperitoneum: The psoas muscles are symmetric. The abdominal aorta is normal in caliber. The inferior vena cava is unremarkable. There is no retroperitoneal or mesenteric adenopathy. Bones/Soft Tissues: The extra-abdominal soft tissues are unremarkable. There is an intertrochanteric fracture of the proximal left femur. Intertrochanteric fracture of the proximal left femur. Small pericardial effusion. Uncomplicated descending colon diverticulosis. No acute thoracic, abdominal, or pelvic abnormality. CT LUMBAR SPINE TRAUMA RECONSTRUCTION    Result Date: 1/29/2022  EXAMINATION: CT OF THE LUMBAR SPINE WITHOUT CONTRAST  1/29/2022 TECHNIQUE: CT of the lumbar spine was performed without the administration of intravenous contrast. Multiplanar reformatted images are provided for review. Adjustment of mA and/or kV according to patient size was utilized. Dose modulation, iterative reconstruction, and/or weight based adjustment of the mA/kV was utilized to reduce the radiation dose to as low as reasonably achievable. COMPARISON: None HISTORY: ORDERING SYSTEM PROVIDED HISTORY: fall - trauma TECHNOLOGIST PROVIDED HISTORY: fall - trauma Reason for Exam: S/P Fall - TRAUMA. **RECONS** FINDINGS: BONES/ALIGNMENT: There is levocurvature of the lumbar spine. The vertebral body heights are maintained. There is no spondylolisthesis. There is no acute fracture or malalignment of the lumbar spine.  DEGENERATIVE CHANGES: There is multilevel degenerative disc disease with disc space narrowing throughout the mid and lower lumbar spine. There is multilevel degenerative facet hypertrophy. There is bilateral bony foraminal narrowing within the mid and lower lumbar spine. SOFT TISSUES/RETROPERITONEUM: No paraspinal mass is seen. No acute fracture or malalignment of the lumbar spine. Multilevel degenerative change with bilateral bony foraminal narrowing in the mid and lower lumbar spine. CT THORACIC SPINE TRAUMA RECONSTRUCTION    Result Date: 1/29/2022  EXAMINATION: CT OF THE THORACIC SPINE WITHOUT CONTRAST  1/29/2022 1:36 am: TECHNIQUE: CT of the thoracic spine was performed without the administration of intravenous contrast. Multiplanar reformatted images are provided for review. Dose modulation, iterative reconstruction, and/or weight based adjustment of the mA/kV was utilized to reduce the radiation dose to as low as reasonably achievable. COMPARISON: None. HISTORY: ORDERING SYSTEM PROVIDED HISTORY: Fall - trauma TECHNOLOGIST PROVIDED HISTORY: Fall - trauma Reason for Exam: S/P Fall - TRAUMA. **RECONS** FINDINGS: BONES/ALIGNMENT: There is a normal thoracic kyphosis. The vertebral body heights are maintained. The facet joints are aligned. There is no acute fracture or malalignment of the thoracic spine. DEGENERATIVE CHANGES: There is multilevel degenerative disc disease and multilevel degenerative facet hypertrophy. There is no significant canal stenosis or bony foraminal narrowing. SOFT TISSUES: No paraspinal mass is seen. No acute fracture or malalignment of the thoracic spine. XR HIP W PELVIS MIN 5 VWS BILATERAL    Result Date: 1/28/2022  EXAMINATION: ONE XRAY VIEW OF THE PELVIS AND TWO XRAY VIEWS OF EACH OF THE BILATERAL HIPS 1/28/2022 10:52 pm COMPARISON: None. HISTORY: ORDERING SYSTEM PROVIDED HISTORY: fall TECHNOLOGIST PROVIDED HISTORY: fall Reason for Exam: Fall, bilateral hip pain, Majority of pain in left hip and down left leg FINDINGS: Bony pelvis is intact.   There is an intertrochanteric fracture of the proximal left femur. There is degenerative change of the lower lumbar spine. There is degenerative change of the SI joints and hip joints. Phleboliths are seen in the pelvis. The surrounding soft tissues are unremarkable. Intertrochanteric fracture of the proximal left femur. DISCHARGE INSTRUCTIONS     Discharge Medications:        Medication List      START taking these medications    gabapentin 300 MG capsule  Commonly known as: NEURONTIN  Take 1 capsule by mouth every 8 hours for 7 days. metoprolol tartrate 25 MG tablet  Commonly known as: LOPRESSOR  Take 0.5 tablets by mouth 2 times daily     midodrine 5 MG tablet  Commonly known as: PROAMATINE  Take 1 tablet by mouth every 8 hours     senna 8.6 MG tablet  Commonly known as: SENOKOT  Take 1 tablet by mouth nightly        CHANGE how you take these medications    tiZANidine 4 MG tablet  Commonly known as: ZANAFLEX  Take 1 tablet by mouth nightly as needed (muscke spasms)  What changed: See the new instructions.         CONTINUE taking these medications    Armodafinil 200 MG Tabs     ascorbic acid 500 MG tablet  Commonly known as: VITAMIN C  Take 1 tablet by mouth daily     Boost Liqd  1 can twice a day for meals     Breo Ellipta 200-25 MCG/INH Aepb inhaler  Generic drug: Fluticasone furoate-vilanterol     carboxymethylcellulose 0.5 % Soln ophthalmic solution  Commonly known as: REFRESH PLUS     cetirizine 10 MG tablet  Commonly known as: ZYRTEC  TAKE 1 TABLET BY MOUTH DAILY     Creon 68284-71706 units delayed release capsule  Generic drug: lipase-protease-amylase  TAKE 1 CAPSULE BY MOUTH THREE TIMES DAILY WITH MEALS     docusate sodium 100 MG capsule  Commonly known as: COLACE  TAKE 1 CAPSULE BY MOUTH TWICE DAILY AS NEEDED FOR CONSTIPATION     ferrous sulfate 325 (65 Fe) MG EC tablet  Commonly known as: FE TABS 325  Take 1 tablet by mouth 2 times daily (with meals)     Handicap Placard Misc  by Does not apply route Incontinence Brief Large Misc  To use as directed. Use 3x a day     ipratropium-albuterol 0.5-2.5 (3) MG/3ML Soln nebulizer solution  Commonly known as: DUONEB     Multi-Vitamins Tabs  TAKE 1 TABLET BY MOUTH DAILY     pantoprazole 40 MG tablet  Commonly known as: PROTONIX  TAKE 1 TABLET BY MOUTH DAILY     rivaroxaban 20 MG Tabs tablet  Commonly known as: Xarelto  TAKE 1 TABLET BY MOUTH DAILY     rOPINIRole 0.25 MG tablet  Commonly known as: REQUIP  TAKE 1 TABLET BY MOUTH TWICE DAILY     SM Lubricant Eye Drops 0.4-0.3 % ophthalmic solution  Generic drug: polyethyl glycol-propyl glycol 0.4-0.3 %  PLACE 1 DROP INTO BOTH EYES FOUR TIMES DAILY AS NEEDED FOR DRY EYES     tamsulosin 0.4 MG capsule  Commonly known as: FLOMAX  Take 1 capsule by mouth daily     Tri-Balance Orthotics Mens Misc  Provide insurance covered orthotics shoes, wear daily     Ventolin  (90 Base) MCG/ACT inhaler  Generic drug: albuterol sulfate HFA     vitamin B-12 100 MCG tablet  Commonly known as: CYANOCOBALAMIN  TAKE 1 TABLET BY MOUTH DAILY AS NEEDED FOR FATIGUE        STOP taking these medications    benzonatate 100 MG capsule  Commonly known as: TESSALON     bumetanide 1 MG tablet  Commonly known as: BUMEX     diclofenac sodium 1 % Gel  Commonly known as: VOLTAREN     fluticasone 50 MCG/ACT nasal spray  Commonly known as: FLONASE     metoprolol succinate 50 MG extended release tablet  Commonly known as: TOPROL XL     potassium chloride 20 MEQ extended release tablet  Commonly known as: KLOR-CON M        ASK your doctor about these medications    cephALEXin 500 MG capsule  Commonly known as: KEFLEX  Take 1 capsule by mouth 4 times daily for 8 days  Ask about: Should I take this medication?      methocarbamol 750 MG tablet  Commonly known as: ROBAXIN  Take 1 tablet by mouth every 6 hours for 10 days  Ask about: Should I take this medication?     nystatin [id number] UNIT/ML suspension  Commonly known as: MYCOSTATIN  Take 5 mLs by mouth 4 times

## 2022-02-28 NOTE — DISCHARGE SUMMARY
DISCHARGE SUMMARY:     PATIENT NAME:  Sarah Adam  YOB: 1945  MEDICAL RECORD NO. [id number]  PRIMARY CARE PHYSICIAN: JULIAN Sorenson CNP  ADMIT DATE: 1/28/2022  DISPOSITION:  SNF  DISCHARGE DATE:  2/11/2022  ADMITTING DIAGNOSIS:  Left femur fracture      DIAGNOSIS:       Patient Active Problem List   Diagnosis    Chronic atrial fibrillation ([address]    Dyslipidemia    Gastroesophageal reflux disease without esophagitis    Osteoarthritis of lumbar spine    OA (osteoarthritis) of knee, bilateral    Foot drop, right    Hallux valgus, acquired, bilateral    Hearing impairment    Essential hypertension    Slow transit constipation    MARIAMA on CPAP    COPD (chronic obstructive pulmonary disease) (HCC)    Chronic diastolic heart failure (HCC)    History of bariatric surgery including banding leading to esophageal stricture and aspiration    BPH (benign prostatic hyperplasia)    Myalgia    Atrial fibrillation with slow ventricular response ([address]    Pacemaker pocket hematoma, initial encounter    Pulmonary hypertension ([address]    Presence of permanent cardiac pacemaker    Fall at home, initial encounter    Left displaced femoral neck fracture ([address]    Retained orthopedic hardware    COVID-19    Elevated C-reactive protein (CRP)         CONSULTANTS: orthopedic surgery     PROCEDURES: IMN of left interprosthetic femur fx (1/30/2022)        HOSPITAL COURSE:   Sarah Adam is a 77 y.o. male who was admitted on 1/29/2022 s/p fall from [facility] after trying to reach his walker. This resulted in a left interprosthetic femur fx. He underwent left femur fracture IMN on 1/30/2022. He received a course of Keflex postop and was discharged on remainder on course (end date 2/16/2022). A prevena wound vac was used while inpatient and incision was healing well so it was removed prior to discharge.      Labs and imaging were followed daily.  The patient had some hyponatremia while inpatient which resolved with salt tabs and fluid restriction. His home afib meds were continued throughout admission. He did develop some diarrhea while hospitalized which resolved prior to discharge. He was found to be COVID (+) on admission and ID followed throughout admission, was in isolation until 2/7 and received sotrivimab for asymptomatic COVID in a high risk patient.      At time of discharge, Mike Gillette was tolerating a easy chew diet, having bowel movements, ambulating with assistance, had adequate analgesia on oral pain medications, and had no signs of symptoms of complications. He was deemed medically stable and discharged to SNF on 2/11/2022 with instructions to follow up with his PCP and orthopedic surgery. Pt expressed understanding of and agreement with DC plans.       PHYSICAL EXAMINATION:         Discharge Vitals:  height is 5' 10\" (1.778 m) and weight is 201 lb 1.6 oz (91.2 kg). His axillary temperature is 98.2 °F (36.8 °C). His blood pressure is 100/65 and his pulse is 70. His respiration is 15 and oxygen saturation is 96%. CONSTITUTIONAL: awake, alert, cooperative, no apparent distress  LUNGS: no respiratory distress, no audible wheezing, no chest wall tenderness  CARDIOVASCULAR: Regular rate and rhythm  ABDOMEN: soft, nontender, nondistended, no guarding, no rebound tenderness   MUSCULOSKELETAL: Appropriate tenderness to palpation about the hip and knee.  Incision CDI  Marthenia Denver, alert, oriented to name, place and time  EXTREMITIES: peripheral pulses normal, + pedal edema left  SKIN: normal coloration and turgor other than resolving bruising on the left knee     LABS:   No results for input(s): WBC, HGB, HCT, PLT, NA, K, CL, CO2, BUN, CREATININE in the last 72 hours.     DIAGNOSTIC TESTS:    ECHO Complete 2D W Doppler W Color     Result Date: 1/29/2022  Transthoracic Echocardiography Report (TTE)  Patient Name [facility]      Date of Study               01/29/2022               SAM VAUGHN   Date of 1945  Gender                      Male  Birth   Age          77 year(s)  Race                           Room Number  2006        Height:                     70 inch, 177.8 cm   Corporate ID [id number]    Weight:                     170 pounds, 77.1 kg  #   Patient Acct [de-identified]   BSA:          1.95 m^2      BMI:      24.39  #                                                              kg/m^2   MR #         [id number]     Sonographer                 Milton Cristina   Accession #  [id number]  Interpreting Physician      Kathie Seay   Fellow                   Referring Nurse                           Practitioner   Interpreting             Referring Physician         Karoline Good DO  Fellow  Type of Study   TTE procedure:2D Echocardiogram, M-Mode, Doppler, Color Doppler. Procedure Date Date: 01/29/2022 Start: 07:21 AM Study Location: [facility] Technical Quality: Adequate visualization Indications:Atrial fibrillation, Congestive heart failure and Preop cardiac evaluation. History / Tech. Comments: Echo done at patient bedside. Procedure explained to patient. HTN, MARIAMA, COPD Patient Status: Inpatient Height: 70 inches Weight: 170 pounds BSA: 1.95 m^2 BMI: 24.39 kg/m^2 Allergies   - *Unlisted:(Darvocet, propoxyphene). - Percocet. CONCLUSIONS Summary Left ventricle is normal in size. Global left ventricular systolic function is difficult to assess due to heart rate and rhythm but appears normal. Visually estimated EF 50%. Abnormal septal motion; may be due to pacemaker. Mild concentric left ventricular hypertrophy. No significant valvular regurgitation or stenosis seen.  Signature ----------------------------------------------------------------------------  Electronically signed by Jennifer Boyce(Sonographer) on 01/29/2022  08:04 AM ---------------------------------------------------------------------------- ---------------------------------------------------------------------------- Electronically signed by Kathie Seay(Interpreting physician) on  2022 12:10 PM ---------------------------------------------------------------------------- FINDINGS Left Atrium Left atrium is moderately dilated. Left Ventricle Left ventricle is normal in size. Global left ventricular systolic function is difficult to assess due to heart rate and rhythm but appears normal. Visually estimated EF 50%. Abnormal septal motion; may be due to pacemaker. Mild concentric left ventricular hypertrophy. Right Atrium Right atrial dilatation. Right Ventricle Normal right ventricular size and function. Pacemaker / ICD lead seen in right ventricle. TAPSE value of 1.07cm noted. Mitral Valve Normal mitral valve structure. Trivial mitral regurgitation. Aortic Valve Aortic valve appears trileaflet. Aortic valve is mildly sclerotic but opens well. No aortic insufficiency. Tricuspid Valve Normal tricuspid valve structure and function. No tricuspid regurgitation. Pulmonic Valve The pulmonic valve is normal in structure. No pulmonic insufficiency. Pericardial Effusion Small posterior pericardial effusion. Small anterior fat pad vs. pericardial effusion. Miscellaneous IVC not well visualized.  M-mode / 2D Measurements & Calculations:   LVIDd:4 cm(3.7 - 5.6 cm)         Diastolic XDLRSE:86.3 ml  DYCRX:0.8 cm(2.2 - 4.0 cm)       Systolic QNLQOU:42.7 ml  UJQO:6.4 cm(0.6 - 1.1 cm)        LA Dimension: 4.1 cm(1.9 - 4.0 cm)  LVPWd:1.3 cm(0.6 - 1.1 cm)       LA volume/Index: 91.27 ml /47m^2  Fractional Shortenin.5 %     LVOT:1.9 cm  Calculated LVEF (%): 25.06 %     RVDd:3.9 cm   Mitral:                                 Aortic   Valve Area (P1/2-Time): 4.89 cm^2       Peak Velocity: 1.26 m/s  Peak E-Wave: 0.80 m/s                   Mean Velocity: 0.82 m/s                                          Peak Gradient: 6.35 mmHg  Peak Gradient: 2.54 mmHg                Mean Gradient: 3 mmHg  Mean Gradient: 1 mmHg  Deceleration Time: 153 msec P1/2t: 45 msec                          Area (continuity): 2.11 cm^2                                          AV VTI: 25.1 cm   Area (continuity): 2.27 cm^2  Mean Velocity: 0.51 m/s  Diastology / Tissue Doppler Lateral Wall E' velocity:0.06 m/s Lateral Wall E/E':14.4     XR FEMUR LEFT (MIN 2 VIEWS)     Result Date: 1/28/2022  EXAMINATION: 5 XRAY VIEWS OF THE LEFT FEMUR 1/28/2022 10:52 pm COMPARISON: None. HISTORY: ORDERING SYSTEM PROVIDED HISTORY: fall TECHNOLOGIST PROVIDED HISTORY: fall Reason for Exam: Fall, bilateral hip pain, Majority of pain in left hip and down left leg FINDINGS: There is an intertrochanteric fracture of the proximal femur. There is an intramedullary lamberto fixing a remote healed femoral fracture. There is partial visualization of a left knee arthroplasty without complication. The visualized bony pelvis appears intact. The surrounding soft tissues are unremarkable.      Intertrochanteric fracture of the proximal left femur.      XR KNEE LEFT (3 VIEWS)     Result Date: 1/28/2022  EXAMINATION: THREE XRAY VIEWS OF THE LEFT KNEE 1/28/2022 10:52 pm COMPARISON: None. HISTORY: ORDERING SYSTEM PROVIDED HISTORY: fall TECHNOLOGIST PROVIDED HISTORY: fall Reason for Exam: Fall, bilateral hip pain, Majority of pain in left hip and down left leg FINDINGS: There is a left knee arthroplasty without evidence for complication. There is a femoral intramedullary lamberto fixing a remote healed femoral fracture. There is no acute osseous abnormality. The surrounding soft tissues are unremarkable.      No acute osseous or soft tissue abnormality.      XR ANKLE LEFT (MIN 3 VIEWS)     Result Date: 2/3/2022  EXAMINATION: THREE XRAY VIEWS OF THE LEFT ANKLE 1/29/2022 7:59 pm COMPARISON: None. HISTORY: ORDERING SYSTEM PROVIDED HISTORY: left ankle pain TECHNOLOGIST PROVIDED HISTORY: left ankle pain FINDINGS: No acute osseous abnormality. The talar dome is intact. Ankle mortise is aligned. Diffuse osteopenia. Anterolateral soft tissue swelling. Vascular calcifications noted.      1. Anterolateral soft tissue swelling. 2. No acute osseous abnormality of the left ankle. 3. Diffuse osteopenia.      CT HEAD WO CONTRAST     Result Date: 1/29/2022  EXAMINATION: CT OF THE HEAD WITHOUT CONTRAST  1/29/2022 12:33 am TECHNIQUE: CT of the head was performed without the administration of intravenous contrast. Dose modulation, iterative reconstruction, and/or weight based adjustment of the mA/kV was utilized to reduce the radiation dose to as low as reasonably achievable. COMPARISON: CT brain performed 04/07/2021. HISTORY: ORDERING SYSTEM PROVIDED HISTORY: fall on xeralto TECHNOLOGIST PROVIDED HISTORY: fall on xeralto Decision Support Exception - unselect if not a suspected or confirmed emergency medical condition->Emergency Medical Condition (MA) Reason for Exam: S/P Fall - On xeralto. FINDINGS: BRAIN/VENTRICLES: There is no acute intracranial hemorrhage, mass effect, or midline shift. There is satisfactory overall gray-white matter differentiation. The ventricular structures are symmetric and unremarkable. The infratentorial structures are unremarkable. ORBITS: The visualized portion of the orbits demonstrate no acute abnormality. SINUSES: The visualized paranasal sinuses and mastoid air cells demonstrate no acute abnormality. SOFT TISSUES/SKULL:  No acute abnormality of the visualized skull or soft tissues.      No acute intracranial abnormality.      CT CERVICAL SPINE WO CONTRAST     Result Date: 1/29/2022  EXAMINATION: CT OF THE CERVICAL SPINE WITHOUT CONTRAST 1/29/2022 1:36 am TECHNIQUE: CT of the cervical spine was performed without the administration of intravenous contrast. Multiplanar reformatted images are provided for review. Dose modulation, iterative reconstruction, and/or weight based adjustment of the mA/kV was utilized to reduce the radiation dose to as low as reasonably achievable. COMPARISON: None.  HISTORY: ORDERING SYSTEM PROVIDED HISTORY: Fall - trauma TECHNOLOGIST PROVIDED HISTORY: Fall - trauma Decision Support Exception - unselect if not a suspected or confirmed emergency medical condition->Emergency Medical Condition (MA) Reason for Exam: S/P Fall - Trauma. ** C-collar applied ** FINDINGS: BONES/ALIGNMENT: The vertebral body heights are maintained. The facet joints are aligned. There is no acute fracture or malalignment. There is no spondylolisthesis. DEGENERATIVE CHANGES: There is multilevel degenerative disc disease with disc space narrowing most pronounced at the C7-T1 level. There is no canal stenosis or significant bony foraminal narrowing. SOFT TISSUES: The posterior paraspinal soft tissues are unremarkable. Multiple nodules are seen throughout the right thyroid lobe. The lung apices are without acute process.      No acute fracture or malalignment of the cervical spine. Multiple nodules within the right thyroid lobe and correlation with nonemergent sonography is recommended.      XR SHOULDER LEFT (MIN 2 VIEWS)     Result Date: 1/29/2022  EXAMINATION: 3 XRAY VIEWS OF THE LEFT SHOULDER 1/29/2022 12:48 am COMPARISON: None. HISTORY: ORDERING SYSTEM PROVIDED HISTORY: Trauma/Fracture TECHNOLOGIST PROVIDED HISTORY: Ap/scap y/axillary. Thank you Trauma/Fracture Reason for Exam: Fall FINDINGS: There is no acute osseous abnormality. There is hardware within the left humeral head from prior rotator cuff repair. There is degenerative change of the left shoulder joint spaces. The surrounding soft tissues are unremarkable. The visualized left lung is without acute process.      No acute osseous or soft tissue abnormality. Degenerative change of the left shoulder joint spaces with evidence of prior rotator cuff repair.      XR CHEST PORTABLE     Result Date: 1/29/2022  EXAMINATION: ONE XRAY VIEW OF THE CHEST 1/29/2022 3:14 am COMPARISON: Chest radiograph performed 01/07/2022.  HISTORY: ORDERING SYSTEM PROVIDED HISTORY: pre op TECHNOLOGIST PROVIDED HISTORY: pre op Reason for Exam: pre-op   upright port FINDINGS: There is no acute consolidation or effusion. There is no pneumothorax. The mediastinal structures are stable with stable cardiac leads. The upper abdomen is unremarkable. The extrathoracic soft tissues are unremarkable.      No acute cardiopulmonary process.      CT CHEST ABDOMEN PELVIS W CONTRAST     Result Date: 1/29/2022  EXAMINATION: CT OF THE CHEST, ABDOMEN, AND PELVIS WITH CONTRAST 1/29/2022 1:36 am TECHNIQUE: CT of the chest, abdomen and pelvis was performed with the administration of intravenous contrast. Multiplanar reformatted images are provided for review. Dose modulation, iterative reconstruction, and/or weight based adjustment of the mA/kV was utilized to reduce the radiation dose to as low as reasonably achievable. COMPARISON: None HISTORY: ORDERING SYSTEM PROVIDED HISTORY: fall - trauma TECHNOLOGIST PROVIDED HISTORY: fall - trauma Decision Support Exception - unselect if not a suspected or confirmed emergency medical condition->Emergency Medical Condition (MA) Reason for Exam: S/P Fall - TRAUMA  **HIP PAIN** FINDINGS: Chest: Mediastinum: Soft tissues of the thoracic inlet are unremarkable. The thoracic aorta is normal in caliber. The main pulmonary artery is normal in caliber. There is a trace pericardial effusion. There is no mediastinal or hilar adenopathy. Lungs/pleura: There is atelectasis versus scarring within the lungs bilaterally. There is no effusion. There is no pneumothorax. The tracheobronchial tree is patent. Soft Tissues/Bones: The extrathoracic soft tissues are unremarkable. There is no axillary adenopathy. There is no acute osseous abnormality. Abdomen/Pelvis: Organs: The liver and spleen are normal size and overall attenuation. There are stones within the gallbladder. The pancreas is atrophic. The adrenal glands are unremarkable. There are bilateral renal cysts. The ureters are not dilated. The urinary bladder is unremarkable. GI/Bowel: The stomach is grossly unremarkable. Loops of small bowel are normal in caliber without evidence for obstruction. The colon contains air and fecal residue. There are scattered uncomplicated diverticula involving the descending colon. There is no free air or free fluid. Pelvis: Phleboliths are seen in the pelvis. The prostate gland and seminal vesicles are otherwise unremarkable. Peritoneum/Retroperitoneum: The psoas muscles are symmetric. The abdominal aorta is normal in caliber. The inferior vena cava is unremarkable. There is no retroperitoneal or mesenteric adenopathy. Bones/Soft Tissues: The extra-abdominal soft tissues are unremarkable. There is an intertrochanteric fracture of the proximal left femur.      Intertrochanteric fracture of the proximal left femur. Small pericardial effusion. Uncomplicated descending colon diverticulosis. No acute thoracic, abdominal, or pelvic abnormality.      CT LUMBAR SPINE TRAUMA RECONSTRUCTION     Result Date: 1/29/2022  EXAMINATION: CT OF THE LUMBAR SPINE WITHOUT CONTRAST  1/29/2022 TECHNIQUE: CT of the lumbar spine was performed without the administration of intravenous contrast. Multiplanar reformatted images are provided for review. Adjustment of mA and/or kV according to patient size was utilized. Dose modulation, iterative reconstruction, and/or weight based adjustment of the mA/kV was utilized to reduce the radiation dose to as low as reasonably achievable. COMPARISON: None HISTORY: ORDERING SYSTEM PROVIDED HISTORY: fall - trauma TECHNOLOGIST PROVIDED HISTORY: fall - trauma Reason for Exam: S/P Fall - TRAUMA. **RECONS** FINDINGS: BONES/ALIGNMENT: There is levocurvature of the lumbar spine. The vertebral body heights are maintained. There is no spondylolisthesis. There is no acute fracture or malalignment of the lumbar spine.  DEGENERATIVE CHANGES: There is multilevel degenerative disc disease with disc space narrowing throughout the mid and lower lumbar spine. There is multilevel degenerative facet hypertrophy. There is bilateral bony foraminal narrowing within the mid and lower lumbar spine. SOFT TISSUES/RETROPERITONEUM: No paraspinal mass is seen.      No acute fracture or malalignment of the lumbar spine. Multilevel degenerative change with bilateral bony foraminal narrowing in the mid and lower lumbar spine.      CT THORACIC SPINE TRAUMA RECONSTRUCTION     Result Date: 1/29/2022  EXAMINATION: CT OF THE THORACIC SPINE WITHOUT CONTRAST  1/29/2022 1:36 am: TECHNIQUE: CT of the thoracic spine was performed without the administration of intravenous contrast. Multiplanar reformatted images are provided for review. Dose modulation, iterative reconstruction, and/or weight based adjustment of the mA/kV was utilized to reduce the radiation dose to as low as reasonably achievable. COMPARISON: None. HISTORY: ORDERING SYSTEM PROVIDED HISTORY: Fall - trauma TECHNOLOGIST PROVIDED HISTORY: Fall - trauma Reason for Exam: S/P Fall - TRAUMA. **RECONS** FINDINGS: BONES/ALIGNMENT: There is a normal thoracic kyphosis. The vertebral body heights are maintained. The facet joints are aligned. There is no acute fracture or malalignment of the thoracic spine. DEGENERATIVE CHANGES: There is multilevel degenerative disc disease and multilevel degenerative facet hypertrophy. There is no significant canal stenosis or bony foraminal narrowing. SOFT TISSUES: No paraspinal mass is seen.      No acute fracture or malalignment of the thoracic spine.      XR HIP W PELVIS MIN 5 VWS BILATERAL     Result Date: 1/28/2022  EXAMINATION: ONE XRAY VIEW OF THE PELVIS AND TWO XRAY VIEWS OF EACH OF THE BILATERAL HIPS 1/28/2022 10:52 pm COMPARISON: None.  HISTORY: ORDERING SYSTEM PROVIDED HISTORY: fall TECHNOLOGIST PROVIDED HISTORY: fall Reason for Exam: Fall, bilateral hip pain, Majority of pain in left hip and down left leg FINDINGS: Bony pelvis is intact. There is an intertrochanteric fracture of the proximal left femur. There is degenerative change of the lower lumbar spine. There is degenerative change of the SI joints and hip joints. Phleboliths are seen in the pelvis.   The surrounding soft tissues are unremarkable.      Intertrochanteric fracture of the proximal left femur.                  DISCHARGE INSTRUCTIONS      Discharge Medications:               Medication List             START taking these medications          gabapentin 300 MG capsule  Commonly known as: NEURONTIN  Take 1 capsule by mouth every 8 hours for 7 days.      metoprolol tartrate 25 MG tablet  Commonly known as: LOPRESSOR  Take 0.5 tablets by mouth 2 times daily      midodrine 5 MG tablet  Commonly known as: PROAMATINE  Take 1 tablet by mouth every 8 hours      senna 8.6 MG tablet  Commonly known as: SENOKOT  Take 1 tablet by mouth nightly                      CHANGE how you take these medications          tiZANidine 4 MG tablet  Commonly known as: ZANAFLEX  Take 1 tablet by mouth nightly as needed (muscke spasms)  What changed: See the new instructions.                      CONTINUE taking these medications          Armodafinil 200 MG Tabs      ascorbic acid 500 MG tablet  Commonly known as: VITAMIN C  Take 1 tablet by mouth daily      Boost Liqd  1 can twice a day for meals      Breo Ellipta 200-25 MCG/INH Aepb inhaler  Generic drug: Fluticasone furoate-vilanterol      carboxymethylcellulose 0.5 % Soln ophthalmic solution  Commonly known as: REFRESH PLUS      cetirizine 10 MG tablet  Commonly known as: ZYRTEC  TAKE 1 TABLET BY MOUTH DAILY      Creon 23373-15183 units delayed release capsule  Generic drug: lipase-protease-amylase  TAKE 1 CAPSULE BY MOUTH THREE TIMES DAILY WITH MEALS      docusate sodium 100 MG capsule  Commonly known as: COLACE  TAKE 1 CAPSULE BY MOUTH TWICE DAILY AS NEEDED FOR CONSTIPATION      ferrous sulfate 325 (65 Fe) MG EC tablet  Commonly known as: FE TABS 325  Take 1 tablet by mouth 2 times daily (with meals)      Handicap Placard Misc  by Does not apply route      Incontinence Brief Large Misc  To use as directed.  Use 3x a day      ipratropium-albuterol 0.5-2.5 (3) MG/3ML Soln nebulizer solution  Commonly known as: DUONEB      Multi-Vitamins Tabs  TAKE 1 TABLET BY MOUTH DAILY      pantoprazole 40 MG tablet  Commonly known as: PROTONIX  TAKE 1 TABLET BY MOUTH DAILY      rivaroxaban 20 MG Tabs tablet  Commonly known as: Xarelto  TAKE 1 TABLET BY MOUTH DAILY      rOPINIRole 0.25 MG tablet  Commonly known as: REQUIP  TAKE 1 TABLET BY MOUTH TWICE DAILY      SM Lubricant Eye Drops 0.4-0.3 % ophthalmic solution  Generic drug: polyethyl glycol-propyl glycol 0.4-0.3 %  PLACE 1 DROP INTO BOTH EYES FOUR TIMES DAILY AS NEEDED FOR DRY EYES      tamsulosin 0.4 MG capsule  Commonly known as: FLOMAX  Take 1 capsule by mouth daily      Tri-Balance Orthotics Mens Misc  Provide insurance covered orthotics shoes, wear daily      Ventolin  (90 Base) MCG/ACT inhaler  Generic drug: albuterol sulfate HFA      vitamin B-12 100 MCG tablet  Commonly known as: CYANOCOBALAMIN  TAKE 1 TABLET BY MOUTH DAILY AS NEEDED FOR FATIGUE                      STOP taking these medications          benzonatate 100 MG capsule  Commonly known as: TESSALON      bumetanide 1 MG tablet  Commonly known as: BUMEX      diclofenac sodium 1 % Gel  Commonly known as: VOLTAREN      fluticasone 50 MCG/ACT nasal spray  Commonly known as: FLONASE      metoprolol succinate 50 MG extended release tablet  Commonly known as: TOPROL XL      potassium chloride 20 MEQ extended release tablet  Commonly known as: KLOR-CON M                      ASK your doctor about these medications          cephALEXin 500 MG capsule  Commonly known as: KEFLEX  Take 1 capsule by mouth 4 times daily for 8 days  Ask about: Should I take this medication?      methocarbamol 750 MG tablet  Commonly known as: ROBAXIN  Take 1 tablet by mouth every 6 hours for 10 days  Ask about: Should I take this medication?      nystatin 170411 UNIT/ML suspension  Commonly known as: MYCOSTATIN  Take 5 mLs by mouth 4 times daily for 34 doses  Ask about: Should I take this medication?                          Where to Get Your Medications             These medications were sent to [facility], [address] [place] [address]  [address] [facility] [address]     Phone: [phone]   · docusate sodium 100 MG capsule  · rOPINIRole 0.25 MG tablet  · tiZANidine 4 MG tablet      Information about where to get these medications is not yet available    Ask your nurse or doctor about these medications  · cephALEXin 500 MG capsule  · gabapentin 300 MG capsule  · methocarbamol 750 MG tablet  · metoprolol tartrate 25 MG tablet  · midodrine 5 MG tablet  · nystatin 780623 UNIT/ML suspension  · senna 8.6 MG tablet         Diet: regular diet as tolerated  Activity: - Avoid strenuous activity or exercise until cleared during follow-up appointment  - No driving or operating heavy machinery while taking narcotics   Wound Care: Daily and as needed  Follow-up:   1. Follow up with orthopedic surgery outpatient   2.  Follow up in the next few weeks with PCP: JULIAN Garcia CNP     Time Spent for discharge: 29 minutes     Vi Harris DO

## 2022-03-02 NOTE — DISCHARGE SUMMARY
DISCHARGE SUMMARY:     PATIENT NAME:  Levi Riley  BIRTHDATE: 1945  MEDICAL RECORD EQ. [id number]  PRIMARY CARE PHYSICIAN: JULIAN Chan - CNP  ADMIT DATE: 1/28/2022  DISPOSITION:  SNF  DISCHARGE DATE:  2/12/2022  ADMITTING DIAGNOSIS:  Left femur fracture      DIAGNOSIS:         Patient Active Problem List   Diagnosis    Chronic atrial fibrillation ([address]    Dyslipidemia    Gastroesophageal reflux disease without esophagitis    Osteoarthritis of lumbar spine    OA (osteoarthritis) of knee, bilateral    Foot drop, right    Hallux valgus, acquired, bilateral    Hearing impairment    Essential hypertension    Slow transit constipation    MARIAMA on CPAP    COPD (chronic obstructive pulmonary disease) (HCC)    Chronic diastolic heart failure (HCC)    History of bariatric surgery including banding leading to esophageal stricture and aspiration    BPH (benign prostatic hyperplasia)    Myalgia    Atrial fibrillation with slow ventricular response ([address]    Pacemaker pocket hematoma, initial encounter    Pulmonary hypertension ([address]    Presence of permanent cardiac pacemaker    Fall at home, initial encounter    Left displaced femoral neck fracture ([address]    Retained orthopedic hardware    COVID-19    Elevated C-reactive protein (CRP)         CONSULTANTS: orthopedic surgery     PROCEDURES: IMN of left interprosthetic femur fx (1/30/2022)        HOSPITAL COURSE:   Tj Velasco a 77 y. o. male who was admitted on 1/29/2022 s/p fall from [facility] after trying to reach his walker. This resulted in a left interprosthetic femur fx. He underwent left femur fracture IMN on 1/30/2022. He received a course of Keflex postop and was discharged on remainder on course (end date 2/16/2022).  A prevena wound vac was used while inpatient and incision was healing well so it was removed prior to discharge.      Labs and imaging were followed daily. The patient had some hyponatremia while inpatient which resolved with salt tabs and fluid restriction. His home afib meds were continued throughout admission. He did develop some diarrhea while hospitalized which resolved prior to discharge. He was found to be COVID (+) on admission and ID followed throughout admission, was in isolation until 2/7 and received sotrivimab for asymptomatic COVID in a high risk patient.      At time of discharge, Sam Anguiano was tolerating a easy chew diet, having bowel movements, ambulating with assistance, had adequate analgesia on oral pain medications, and had no signs of symptoms of complications.  He was deemed medically stable and discharged to [facility] 2/11/2022 with instructions to follow up with his PCP and orthopedic surgery.  Pt expressed understanding of and agreement with DC plans.       PHYSICAL EXAMINATION:         Discharge Kierra Mata is 5' 10\" (1.778 m) and weight is 201 lb 1.6 oz (91.2 kg). His axillary temperature is 98.2 °F (36.8 °C). His blood pressure is 100/65 and his pulse is 70. His respiration is 15 and oxygen saturation is 96%. CONSTITUTIONAL: awake, alert, cooperative, no apparent distress  LUNGS: no respiratory distress, no audible wheezing, no chest wall tenderness  CARDIOVASCULAR: Regular rate and rhythm  ABDOMEN: soft, nontender, nondistended, no guarding, no rebound tenderness   MUSCULOSKELETAL: Appropriate tenderness to palpation about the hip and knee.  Incision CDI  Dellie Headings, alert, oriented to name, place and time  EXTREMITIES: peripheral pulses normal, + pedal edema left  SKIN: normal coloration and turgor other than resolving bruising on the left knee     LABS:   No results for input(s): WBC, HGB, HCT, PLT, NA, K, CL, CO2, BUN, CREATININE in the last 72 hours.     DIAGNOSTIC TESTS:    ECHO Complete 2D W Doppler W Color     Result Date: 1/29/2022  Transthoracic Echocardiography Report (TTE)  Patient Name [facility]      Date of Study               01/29/2022               SAM VAUGHN   Date of      [birth year] Romero Strong  Birth   Age          72 year(s)  Race                           Room Number  6710        Height:                     32 inch, 177.8 cm   Corporate ID [id number]    Weight:                     628 pounds, 77.1 kg  #   Patient Acct [de-identified]   BSA:          5.47 m^2      BMI:      57.88  # Praveen Noun #         [id number]     Sonographer                 Donna You #  [id number]  Interpreting Physician Keisha Toth   Fellow                   LARRY Nurse                           [id number]   Interpreting             Referring Physician Kary Hassan DO  Fellow  Type of Study   TTE procedure:2D Echocardiogram, M-Mode, Doppler, Color Doppler.  Procedure Date Date: 01/29/2022 Start: 07:21 AM Study Location: [facility] Technical Quality: Adequate visualization Indications:Atrial fibrillation, Congestive heart failure and Preop cardiac evaluation. History / Tech. Comments: Echo done at patient bedside. Procedure explained to patient. HTN, MARIAMA, COPD Patient Status: Inpatient Height: 70 inches Weight: 170 pounds BSA: 1.95 m^2 BMI: 24.39 kg/m^2 Allergies   - *Unlisted:(Darvocet, propoxyphene).   - Percocet. CONCLUSIONS Summary Left ventricle is normal in size. Global left ventricular systolic function is difficult to assess due to heart rate and rhythm but appears normal. Visually estimated EF 50%. Abnormal septal motion; may be due to pacemaker. Mild concentric left ventricular hypertrophy. No significant valvular regurgitation or stenosis seen.  Signature ----------------------------------------------------------------------------  Electronically signed by Jennifer Corey(Sonographer) on 01/29/2022  08:04 AM ---------------------------------------------------------------------------- ----------------------------------------------------------------------------  Electronically signed by Kathie Seay(Interpreting physician) on  2022 12:10 PM ---------------------------------------------------------------------------- FINDINGS Left Atrium Left atrium is moderately dilated. Left Ventricle Left ventricle is normal in size. Global left ventricular systolic function is difficult to assess due to heart rate and rhythm but appears normal. Visually estimated EF 50%. Abnormal septal motion; may be due to pacemaker. Mild concentric left ventricular hypertrophy. Right Atrium Right atrial dilatation. Right Ventricle Normal right ventricular size and function. Pacemaker / ICD lead seen in right ventricle. TAPSE value of 1.07cm noted. Mitral Valve Normal mitral valve structure. Trivial mitral regurgitation. Aortic Valve Aortic valve appears trileaflet. Aortic valve is mildly sclerotic but opens well. No aortic insufficiency. Tricuspid Valve Normal tricuspid valve structure and function. No tricuspid regurgitation. Pulmonic Valve The pulmonic valve is normal in structure. No pulmonic insufficiency. Pericardial Effusion Small posterior pericardial effusion. Small anterior fat pad vs. pericardial effusion. Miscellaneous IVC not well visualized.  M-mode / 2D Measurements & Calculations:   LVIDd:4 cm(3.7 - 5.6 cm)         Diastolic DUVGBL:44.6 ml  MRNBB:1.9 cm(2.2 - 4.0 cm)       Systolic ZESAAR:12.6 ml  WCXF:4.3 cm(0.6 - 1.1 cm)        LA Dimension: 4.1 cm(1.9 - 4.0 cm)  LVPWd:1.3 cm(0.6 - 1.1 cm)       LA volume/Index: 91.27 ml /47m^2  Fractional Shortenin.5 %     LVOT:1.9 cm  Calculated LVEF (%): 25.06 %     RVDd:3.9 cm   Mitral:                                 Aortic   Valve Area (P1/2-Time): 4.89 cm^2       Peak Velocity: 1.26 m/s  Peak E-Wave: 0.80 m/s                   Mean Velocity: 0.82 m/s                                          Peak Gradient: 6.35 mmHg  Peak Gradient: 2.54 mmHg                Mean Gradient: 3 mmHg  Mean Gradient: 1 mmHg  Deceleration Time: 153 msec  P1/2t: 45 msec                          WSOC (continuity): 2.11 cm^2                                          AV VTI: 25.1 cm   Area (continuity): 2.27 cm^2  Mean Velocity: 0.51 m/s  Diastology / Tissue Doppler Lateral Wall E' velocity:0.06 m/s Lateral Wall E/E':14.4     XR FEMUR LEFT (MIN 2 VIEWS)     Result Date: 1/28/2022  EXAMINATION: 5 XRAY VIEWS OF THE LEFT FEMUR 1/28/2022 10:52 pm COMPARISON: None. HISTORY: ORDERING SYSTEM PROVIDED HISTORY: fall TECHNOLOGIST PROVIDED HISTORY: fall Reason for Exam: Fall, bilateral hip pain, Majority of pain in left hip and down left leg FINDINGS: There is an intertrochanteric fracture of the proximal femur.  There is an intramedullary lamberto fixing a remote healed femoral fracture.  There is partial visualization of a left knee arthroplasty without complication.  The visualized bony pelvis appears intact.  The surrounding soft tissues are unremarkable.      Intertrochanteric fracture of the proximal left femur.      XR KNEE LEFT (3 VIEWS)     Result Date: 1/28/2022  EXAMINATION: THREE XRAY VIEWS OF THE LEFT KNEE 1/28/2022 10:52 pm COMPARISON: None. HISTORY: ORDERING SYSTEM PROVIDED HISTORY: fall TECHNOLOGIST PROVIDED HISTORY: fall Reason for Exam: Fall, bilateral hip pain, Majority of pain in left hip and down left leg FINDINGS: There is a left knee arthroplasty without evidence for complication.  There is a femoral intramedullary lamberto fixing a remote healed femoral fracture. There is no acute osseous abnormality.  The surrounding soft tissues are unremarkable.      No acute osseous or soft tissue abnormality.      XR ANKLE LEFT (MIN 3 VIEWS)     Result Date: 2/3/2022  EXAMINATION: THREE XRAY VIEWS OF THE LEFT ANKLE 1/29/2022 7:59 pm COMPARISON: None.  HISTORY: ORDERING SYSTEM PROVIDED HISTORY: left ankle pain TECHNOLOGIST PROVIDED HISTORY: left ankle pain FINDINGS: No acute osseous abnormality.  The talar dome is intact.  Ankle mortise is aligned.  Diffuse osteopenia.  Anterolateral soft tissue swelling.  Vascular calcifications noted.      1. Anterolateral soft tissue swelling. 2. No acute osseous abnormality of the left ankle. 3. Diffuse osteopenia.      CT HEAD WO CONTRAST     Result Date: 1/29/2022  EXAMINATION: CT OF THE HEAD WITHOUT CONTRAST  1/29/2022 12:33 am TECHNIQUE: CT of the head was performed without the administration of intravenous contrast. Dose modulation, iterative reconstruction, and/or weight based adjustment of the mA/kV was utilized to reduce the radiation dose to as low as reasonably achievable. COMPARISON: CT brain performed 04/07/2021. HISTORY: ORDERING SYSTEM PROVIDED HISTORY: fall on xeralto TECHNOLOGIST PROVIDED HISTORY: fall on xeralto Decision Support Exception - unselect if not a suspected or confirmed emergency medical condition->Emergency Medical Condition (MA) Reason for Exam: S/P Fall - On xeralto. FINDINGS: BRAIN/VENTRICLES: There is no acute intracranial hemorrhage, mass effect, or midline shift.  There is satisfactory overall gray-white matter differentiation.  The ventricular structures are symmetric and unremarkable. The infratentorial structures are unremarkable. ORBITS: The visualized portion of the orbits demonstrate no acute abnormality. SINUSES: The visualized paranasal sinuses and mastoid air cells demonstrate no acute abnormality. SOFT TISSUES/SKULL:  No acute abnormality of the visualized skull or soft tissues.      No acute intracranial abnormality.      CT CERVICAL SPINE WO CONTRAST     Result Date: 1/29/2022  EXAMINATION: CT OF THE CERVICAL SPINE WITHOUT CONTRAST 1/29/2022 1:36 am TECHNIQUE: CT of the cervical spine was performed without the administration of intravenous contrast. Multiplanar reformatted images are provided for review.  Dose modulation, iterative reconstruction, and/or weight based adjustment of the mA/kV was utilized to reduce the radiation dose to as low as reasonably achievable. COMPARISON: None. HISTORY: ORDERING SYSTEM PROVIDED HISTORY: Fall - trauma TECHNOLOGIST PROVIDED HISTORY: Fall - trauma Decision Support Exception - unselect if not a suspected or confirmed emergency medical condition->Emergency Medical Condition (MA) Reason for Exam: S/P Fall - Trauma. ** C-collar applied ** FINDINGS: BONES/ALIGNMENT: The vertebral body heights are maintained.  The facet joints are aligned. Rickford Math is no acute fracture or malalignment.  There is no spondylolisthesis. DEGENERATIVE CHANGES: There is multilevel degenerative disc disease with disc space narrowing most pronounced at the C7-T1 level.  There is no canal stenosis or significant bony foraminal narrowing. SOFT TISSUES: The posterior paraspinal soft tissues are unremarkable. Multiple nodules are seen throughout the right thyroid lobe.  The lung apices are without acute process.      No acute fracture or malalignment of the cervical spine. Multiple nodules within the right thyroid lobe and correlation with nonemergent sonography is recommended.      XR SHOULDER LEFT (MIN 2 VIEWS)     Result Date: 1/29/2022  EXAMINATION: 3 XRAY VIEWS OF THE LEFT SHOULDER 1/29/2022 12:48 am COMPARISON: None. HISTORY: ORDERING SYSTEM PROVIDED HISTORY: Trauma/Fracture TECHNOLOGIST PROVIDED HISTORY: Ap/scap y/axillary. Priscajit Zhen you Trauma/Fracture Reason for Exam: Fall FINDINGS: There is no acute osseous abnormality.  There is hardware within the left humeral head from prior rotator cuff repair. Rickford Math is degenerative change of the left shoulder joint spaces.  The surrounding soft tissues are unremarkable.  The visualized left lung is without acute process.      No acute osseous or soft tissue abnormality.  Degenerative change of the left shoulder joint spaces with evidence of prior rotator cuff repair.      XR CHEST PORTABLE     Result Date: 1/29/2022  EXAMINATION: ONE XRAY VIEW OF THE CHEST 1/29/2022 3:14 am COMPARISON: Chest radiograph performed 01/07/2022. HISTORY: ORDERING SYSTEM PROVIDED HISTORY: pre op TECHNOLOGIST PROVIDED HISTORY: pre op Reason for Exam: pre-op   upright port FINDINGS: There is no acute consolidation or effusion.  There is no pneumothorax.  The mediastinal structures are stable with stable cardiac leads.  The upper abdomen is unremarkable.  The extrathoracic soft tissues are unremarkable.      No acute cardiopulmonary process.      CT CHEST ABDOMEN PELVIS W CONTRAST     Result Date: 1/29/2022  EXAMINATION: CT OF THE CHEST, ABDOMEN, AND PELVIS WITH CONTRAST 1/29/2022 1:36 am TECHNIQUE: CT of the chest, abdomen and pelvis was performed with the administration of intravenous contrast. Multiplanar reformatted images are provided for review. Dose modulation, iterative reconstruction, and/or weight based adjustment of the mA/kV was utilized to reduce the radiation dose to as low as reasonably achievable. COMPARISON: None HISTORY: ORDERING SYSTEM PROVIDED HISTORY: fall - trauma TECHNOLOGIST PROVIDED HISTORY: fall - trauma Decision Support Exception - unselect if not a suspected or confirmed emergency medical condition->Emergency Medical Condition (MA) Reason for Exam: S/P Fall - TRAUMA  **HIP PAIN** FINDINGS: Chest: Mediastinum: Soft tissues of the thoracic inlet are unremarkable.  The thoracic aorta is normal in caliber.  The main pulmonary artery is normal in caliber.  There is a trace pericardial effusion.  There is no mediastinal or hilar adenopathy. Lungs/pleura: There is atelectasis versus scarring within the lungs bilaterally.  There is no effusion. Lanre Hooker is no pneumothorax.  The tracheobronchial tree is patent. Soft Tissues/Bones: The extrathoracic soft tissues are unremarkable. Lanre Hooker is no axillary adenopathy. Lanre Hooker is no acute osseous abnormality. Abdomen/Pelvis: Organs:  The liver and spleen are normal size and overall attenuation.  There are stones within the gallbladder.  The pancreas is atrophic.  The adrenal glands are unremarkable.  There are bilateral renal cysts.  The ureters are not dilated.  The urinary bladder is unremarkable. GI/Bowel: The stomach is grossly unremarkable.  Loops of small bowel are normal in caliber without evidence for obstruction.  The colon contains air and fecal residue. Demetria Plate are scattered uncomplicated diverticula involving the descending colon. Demetria Plate is no free air or free fluid. Pelvis: Phleboliths are seen in the pelvis.  The prostate gland and seminal vesicles are otherwise unremarkable. Peritoneum/Retroperitoneum: The psoas muscles are symmetric.  The abdominal aorta is normal in caliber.  The inferior vena cava is unremarkable. Demetria Plate is no retroperitoneal or mesenteric adenopathy. Bones/Soft Tissues: The extra-abdominal soft tissues are unremarkable. Demetria Plate is an intertrochanteric fracture of the proximal left femur.      Intertrochanteric fracture of the proximal left femur. Small pericardial effusion. Uncomplicated descending colon diverticulosis. No acute thoracic, abdominal, or pelvic abnormality.      CT LUMBAR SPINE TRAUMA RECONSTRUCTION     Result Date: 1/29/2022  EXAMINATION: CT OF THE LUMBAR SPINE WITHOUT CONTRAST  1/29/2022 TECHNIQUE: CT of the lumbar spine was performed without the administration of intravenous contrast. Multiplanar reformatted images are provided for review. Adjustment of mA and/or kV according to patient size was utilized. [facility] modulation, iterative reconstruction, and/or weight based adjustment of the mA/kV was utilized to reduce the radiation dose to as low as reasonably achievable.  COMPARISON: None HISTORY: ORDERING SYSTEM PROVIDED HISTORY: fall - trauma TECHNOLOGIST PROVIDED HISTORY: fall - trauma Reason for Exam: S/P Fall - TRAUMA. **RECONS** FINDINGS: BONES/ALIGNMENT: There is levocurvature of the lumbar spine.  The vertebral body heights are maintained.  There is no spondylolisthesis. Demetria Plate is no acute fracture or malalignment of the lumbar spine. DEGENERATIVE CHANGES: There is multilevel degenerative disc disease with disc space narrowing throughout the mid and lower lumbar spine.  There is multilevel degenerative facet hypertrophy. Pollie Dy is bilateral bony foraminal narrowing within the mid and lower lumbar spine. SOFT TISSUES/RETROPERITONEUM: No paraspinal mass is seen.      No acute fracture or malalignment of the lumbar spine. Multilevel degenerative change with bilateral bony foraminal narrowing in the mid and lower lumbar spine.      CT THORACIC SPINE TRAUMA RECONSTRUCTION     Result Date: 1/29/2022  EXAMINATION: CT OF THE THORACIC SPINE WITHOUT CONTRAST  1/29/2022 1:36 am: TECHNIQUE: CT of the thoracic spine was performed without the administration of intravenous contrast. Multiplanar reformatted images are provided for review. Dose modulation, iterative reconstruction, and/or weight based adjustment of the mA/kV was utilized to reduce the radiation dose to as low as reasonably achievable. COMPARISON: None. HISTORY: ORDERING SYSTEM PROVIDED HISTORY: Fall - trauma TECHNOLOGIST PROVIDED HISTORY: Fall - trauma Reason for Exam: S/P Fall - TRAUMA. **RECONS** FINDINGS: BONES/ALIGNMENT: There is a normal thoracic kyphosis.  The vertebral body heights are maintained.  The facet joints are aligned. Pollie Dy is no acute fracture or malalignment of the thoracic spine. DEGENERATIVE CHANGES: There is multilevel degenerative disc disease and multilevel degenerative facet hypertrophy. Pollie Dy is no significant canal stenosis or bony foraminal narrowing. SOFT TISSUES: No paraspinal mass is seen.      No acute fracture or malalignment of the thoracic spine.      XR HIP W PELVIS MIN 5 VWS BILATERAL     Result Date: 1/28/2022  EXAMINATION: ONE XRAY VIEW OF THE PELVIS AND TWO XRAY VIEWS OF EACH OF THE BILATERAL HIPS 1/28/2022 10:52 pm COMPARISON: None.  HISTORY: ORDERING SYSTEM PROVIDED HISTORY: fall TECHNOLOGIST PROVIDED HISTORY: fall Reason for Exam: Fall, bilateral hip pain, Majority of pain in left hip and down left leg FINDINGS: Bony pelvis is intact.  There is an intertrochanteric fracture of the proximal left femur.  There is degenerative change of the lower lumbar spine.  There is degenerative change of the SI joints and hip joints.  Phleboliths are seen in the pelvis.  The surrounding soft tissues are unremarkable.      Intertrochanteric fracture of the proximal left femur.                  DISCHARGE INSTRUCTIONS      Discharge Medications:                          Medication List                 START taking these medications              gabapentin 300 MG capsule  Commonly known as: NEURONTIN  Take 1 capsule by mouth every 8 hours for 7 days.      metoprolol tartrate 25 MG tablet  Commonly known as: LOPRESSOR  Take 0.5 tablets by mouth 2 times daily      midodrine 5 MG tablet  Commonly known as: PROAMATINE  Take 1 tablet by mouth every 8 hours      senna 8.6 MG tablet  Commonly known as: SENOKOT  Take 1 tablet by mouth nightly                              CHANGE how you take these medications              tiZANidine 4 MG tablet  Commonly known as: ZANAFLEX  Take 1 tablet by mouth nightly as needed (muscke spasms)  What changed: See the new instructions.                              CONTINUE taking these medications              Armodafinil 200 MG Tabs      ascorbic acid 500 MG tablet  Commonly known as: VITAMIN C  Take 1 tablet by mouth daily      Boost Liqd  1 can twice a day for meals      Breo Ellipta 200-25 MCG/INH Aepb inhaler  Generic drug: Fluticasone furoate-vilanterol      carboxymethylcellulose 0.5 % Soln ophthalmic solution  Commonly known as: REFRESH PLUS      cetirizine 10 MG tablet  Commonly known as: ZYRTEC  TAKE 1 TABLET BY MOUTH DAILY      Creon 10438-75511 units delayed release capsule  Generic drug: lipase-protease-amylase  TAKE 1 CAPSULE BY MOUTH THREE TIMES DAILY WITH MEALS      docusate sodium 100 MG capsule  Commonly known as: COLACE  TAKE 1 CAPSULE BY MOUTH TWICE DAILY AS NEEDED FOR CONSTIPATION      ferrous sulfate 325 (65 Fe) MG EC tablet  Commonly known as: FE TABS 325  Take 1 tablet by mouth 2 times daily (with meals)      Handicap Placard Misc  by Does not apply route      Incontinence Brief Large Misc  To use as directed.  Use 3x a day      ipratropium-albuterol 0.5-2.5 (3) MG/3ML Soln nebulizer solution  Commonly known as: DUONEB      Multi-Vitamins Tabs  TAKE 1 TABLET BY MOUTH DAILY      pantoprazole 40 MG tablet  Commonly known as: PROTONIX  TAKE 1 TABLET BY MOUTH DAILY      rivaroxaban 20 MG Tabs tablet  Commonly known as: Xarelto  TAKE 1 TABLET BY MOUTH DAILY      rOPINIRole 0.25 MG tablet  Commonly known as: REQUIP  TAKE 1 TABLET BY MOUTH TWICE DAILY      SM Lubricant Eye Drops 0.4-0.3 % ophthalmic solution  Generic drug: polyethyl glycol-propyl glycol 0.4-0.3 %  PLACE 1 DROP INTO BOTH EYES FOUR TIMES DAILY AS NEEDED FOR DRY EYES      tamsulosin 0.4 MG capsule  Commonly known as: FLOMAX  Take 1 capsule by mouth daily      Tri-Balance Orthotics Mens Misc  Provide insurance covered orthotics shoes, wear daily      Ventolin HFA 108 (90 Base) MCG/ACT inhaler  Generic drug: albuterol sulfate HFA      vitamin B-12 100 MCG tablet  Commonly known as: CYANOCOBALAMIN  TAKE 1 TABLET BY MOUTH DAILY AS NEEDED FOR FATIGUE                              STOP taking these medications              benzonatate 100 MG capsule  Commonly known as: TESSALON      bumetanide 1 MG tablet  Commonly known as: BUMEX      diclofenac sodium 1 % Gel  Commonly known as: VOLTAREN      fluticasone 50 MCG/ACT nasal spray  Commonly known as: FLONASE      metoprolol succinate 50 MG extended release tablet  Commonly known as: TOPROL XL      potassium chloride 20 MEQ extended release tablet  Commonly known as: KLOR-CON M                              ASK your doctor about these medications              cephALEXin 500 MG capsule  Commonly known as: Curtis Samuels  Take 1 capsule by mouth 4 times daily for 8 days  Ask about: Should I take this medication?      methocarbamol 750 MG tablet  Commonly known as: ROBAXIN  Take 1 tablet by mouth every 6 hours for 10 days  Ask about: Should I take this medication?      nystatin [id number] UNIT/ML suspension  Commonly known as: MYCOSTATIN  Take 5 mLs by mouth 4 times daily for 34 doses  Ask about: Should I take this medication?                                  Where to Get Your Medications                 These medications were sent to [address] [place] [address]     Phone: [phone]   · docusate sodium 100 MG capsule  · rOPINIRole 0.25 MG tablet  · tiZANidine 4 MG tablet      Information about where to get these medications is not yet available    Ask your nurse or doctor about these medications  · cephALEXin 500 MG capsule  · gabapentin 300 MG capsule  · methocarbamol 750 MG tablet  · metoprolol tartrate 25 MG tablet  · midodrine 5 MG tablet  · nystatin 744764 UNIT/ML suspension  · senna 8.6 MG tablet         Diet: regular diet as tolerated  Activity: - Avoid strenuous activity or exercise until cleared during follow-up appointment  - No driving or operating heavy machinery while taking narcotics   Wound Care: Daily and as needed  Follow-up:   1. Follow up with orthopedic surgery outpatient   2.  Follow up in the next few weeks with [facility], APRN - CNP     Time Spent for discharge: 29 minutes     Vi Harris DO

## 2022-03-16 DIAGNOSIS — S72.002A LEFT DISPLACED FEMORAL NECK FRACTURE (HCC): Primary | ICD-10-CM

## 2022-03-17 ENCOUNTER — OFFICE VISIT (OUTPATIENT)
Dept: ORTHOPEDIC SURGERY | Age: 77
End: 2022-03-17

## 2022-03-17 VITALS — HEIGHT: 70 IN | BODY MASS INDEX: 28.92 KG/M2 | WEIGHT: 202 LBS

## 2022-03-17 DIAGNOSIS — S72.142D CLOSED 2-PART INTERTROCHANTERIC FRACTURE OF LEFT FEMUR WITH ROUTINE HEALING, SUBSEQUENT ENCOUNTER: Primary | ICD-10-CM

## 2022-03-17 PROCEDURE — 99024 POSTOP FOLLOW-UP VISIT: CPT | Performed by: STUDENT IN AN ORGANIZED HEALTH CARE EDUCATION/TRAINING PROGRAM

## 2022-03-17 NOTE — LETTER
Priya Mesa was evaluated today in the Orthopaedic clinic 6 weeks left distal femur hardware removal with cephalomedullary nail fixation left intertrochanteric femur fracture. Overall, he is doing well. Incision sites were checked and healing well without concern at this time. Orders:  - Physical therapy: continue weightbearing as tolerated, please work on passive, active assist and active ROM to the left knee   - Pain control: If you feel it would be appropriate, I would recommend a multimodal pain protocol of    - Tylenol 1000 mg q 6 h   - Naproxen 500 mg q 12 h   - Oxycodone 5-10 mg q 4-6 h prn pain   - Cyclobenzaprine 10 mg q 6 h prn muscle spasms   - Wound care: Clean incisions and/or wounds daily with soap and water then apply clean dressings until wounds are healed and dry, then leave open to air. Avoid soaks of any kind (bath, hot tub, pool, etc.).    - Please provide Bactrim 800/160 mg PO BID x 14 days  - Follow up: 2 weeks with Dr. Kamilah Street for wound recheck of the left proximal thigh    Thank you in working with us to provide the best care for this patient,         Charm Apgar, DO  3/17/2022 11:25 AM   [address] [facility], [address] [facility], [address] [place] [address]  [phone]

## 2022-03-17 NOTE — PROGRESS NOTES
[address]  [address]i [address]  Dept: [phone]  Dept Fax: [phone]        Ambulatory Follow Up    Subjective:   Justino Rubio is a 77y.o. year old male who presents to our office today for routine followup regarding his   1. Closed 2-part intertrochanteric fracture of left femur with routine healing, subsequent encounter      Chief Complaint   Patient presents with    Post-Op Check     Left intertrochanteric femur fracture     HPI  Mr. Concetta Berkowitz is a 67-year-old male presenting to the office for follow-up after undergoing removal of retrograde left femoral nail with revision open reduction internal fixation with cephalomedullary nail fixation for a left greater trochanteric femur fracture. Patient states he is doing relatively well but he has continued to remain at a skilled nursing facility. He states that he has been extremely limited in mobilization given the fact that he had Covid which has significantly limited his pulmonary function. Patient states he has been weightbearing as much as he can tolerate but is slow to mobilize given his lung issues. He states that he feels over the past few weeks as he has been increasingly mobilizing that he feels he is making significant improvements. Patient denies any interval fevers, chills, nausea, vomiting, chest pain, shortness of breath, numbness or tingling in the affected extremity. Patient denies any interval falls or trauma    Review of Systems    I have reviewed the CC, HPI, ROS, PMH, FHX, Social History. I agree with the documentation provided by other staff, residents, and/or medical students and have reviewed their documentation prior to providing my signature indicating agreement. Objective :   General: Justino Rubio is a 77 y.o. male who is alert and oriented and sitting comfortably in our office.   Ortho Exam   LLE -patient's incisions well approximated with staples which are intact. The most proximal incision where the needle insertion site was performed demonstrates some mild erythema with seropurulent drainage around the staple sites. These were removed where no appreciable induration was appreciated there was mild fluctuance. Remaining distal incisions are well approximated and healed. overt signs of infection. Patient has bilateral AFOs on and has weakness with dorsiflexion and EHL extension. He does perform active range of motion to his left knee although this is limited secondary to weakness. Remaining distal neurovascular status is intact grossly  Neuro: alert. oriented  Eyes: Extra-ocular muscles intact  Mouth: Oral mucosa moist. No perioral lesions  Pulm: Respirations unlabored and regular. Skin: warm, well perfused  Psych:   Patient has good fund of knowledge and displays understanging of exam, diagnosis, and plan. Radiology:   History:   Removal left distal femur retrograde intramedullary nail with antegrade cephalomedullary nail fixation placement for left intertrochanteric femur fracture    Comparison:   Radiographs from January 30, 2022 available for comparison    Findings:   2 views of the left femur including AP and lateral imaging demonstrate previous antegrade cephalomedullary nail fixation without interval change in alignment or fixation of the intertrochanteric femur fracture. There is no loosening or subsidence of the orthopedic implant. Fracture line visualization has decreased compared to previous imaging indicative of bony healing of intertrochanteric femur fracture. Impression:  Stable left intertrochanteric femur fracture status post cephalomedullary nail fixation date of surgery January 30, 2022    Assessment:      1. Closed 2-part intertrochanteric fracture of left femur with routine healing, subsequent encounter       Plan:   1. Discussed the radiographic findings with the patient and his wife.   We are pleased with the overall alignment of the fracture is healing to this point as well as the stability of the implants. 2.  Staples were removed today. Given the concern for the proximal nail insertion site incision we will place the patient on Bactrim for 14 days with follow-up in 2 weeks for a wound check. 3.  A letter was provided for the patient to give to the facility outlining our physical therapy recommendations as well as for Bactrim p.o. x14 days    4. Patient to follow-up in 2 weeks for repeat wound check. All questions were answered his satisfaction, he is aware, understands, and voices his willingness to proceed as discussed. Follow up:Return in about 2 weeks (around 3/31/2022) for Evaluation without X-rays wound check left proximal hip. No orders of the defined types were placed in this encounter. No orders of the defined types were placed in this encounter.       Electronically signed by Chris Faulkner DO   Orthopedic Surgery Resident PGY-5  [facility]  3/17/2022 at 12:28 PM

## 2022-05-26 ENCOUNTER — OFFICE VISIT (OUTPATIENT)
Dept: ORTHOPEDIC SURGERY | Age: 77
End: 2022-05-26
Payer: MEDICARE

## 2022-05-26 VITALS — HEIGHT: 70 IN | WEIGHT: 202 LBS | BODY MASS INDEX: 28.92 KG/M2

## 2022-05-26 DIAGNOSIS — S72.142D CLOSED 2-PART INTERTROCHANTERIC FRACTURE OF LEFT FEMUR WITH ROUTINE HEALING, SUBSEQUENT ENCOUNTER: Primary | ICD-10-CM

## 2022-05-26 PROCEDURE — G8417 CALC BMI ABV UP PARAM F/U: HCPCS | Performed by: ORTHOPAEDIC SURGERY

## 2022-05-26 PROCEDURE — 1036F TOBACCO NON-USER: CPT | Performed by: ORTHOPAEDIC SURGERY

## 2022-05-26 PROCEDURE — 99213 OFFICE O/P EST LOW 20 MIN: CPT | Performed by: ORTHOPAEDIC SURGERY

## 2022-05-26 PROCEDURE — G8427 DOCREV CUR MEDS BY ELIG CLIN: HCPCS | Performed by: ORTHOPAEDIC SURGERY

## 2022-05-26 PROCEDURE — 3017F COLORECTAL CA SCREEN DOC REV: CPT | Performed by: ORTHOPAEDIC SURGERY

## 2022-05-26 PROCEDURE — 1123F ACP DISCUSS/DSCN MKR DOCD: CPT | Performed by: ORTHOPAEDIC SURGERY

## 2022-05-26 NOTE — PROGRESS NOTES
[address] [facility]  [facility] [address]  Dept: [phone]  Dept Fax: [phone]        Orthopaedic Follow Up      Subjective:   Date of Surgery: 1/28/2022    Rach Carballo is a 77y.o. year old male who presents to the clinic today for routine followup regarding his left intertrochanteric periprosthetic fracture status post revision cephalomedullary nail performed the date above therefore placing patient approximately 6 months postop. Patient has been residing in a skilled nursing facility this whole time due to his deconditioning secondary to COPD and COVID. He has been somewhat ambulatory. Physical therapy continues to work with him Thursday. He does report a little bit of stiffness to his left shoulder which she had multiple revision rotator cuff surgeries for in the past.  Physical therapy has not been performing any exercises to his left shoulder at this point. Denies numb/tingling. Otherwise, patient has no orthopedic complaints at this time. Review of Systems  Gen: no fever, chills, malaise  CV: no chest pain or palpitations  Resp: no cough or shortness of breath  GI: no nausea, vomiting, diarrhea, or constipation  Neuro: no numbness, tingling, or weakness  Msk: As described in the HPI  10 remaining systems reviewed and negative    Objective : There were no vitals filed for this visit. Body mass index is 28.98 kg/m². General: No acute distress, resting comfortably in the clinic  Neuro: alert. oriented  Eyes: Extra-ocular muscles intact  Pulm: Respirations unlabored and regular. Skin: warm, well perfused  Psych:   Patient has good fund of knowledge and displays understanging of exam, diagnosis, and plan. MSK: Left hip:  Incisions well-healed scar. No erythema or cellulitic changes seen. Sensations intact light touch distally about the hip. Skin is warm and well-perfused overlying the hip.   Compartments soft and compressible. Radiology:  History: Left IT fracture status post cephalomedullary nail    Findings: AP and crosstable views of the left hip showing intramedullary nail fixation that remains stable and intact. No other acute osseous pathology seen. Impression: Stable left IT fracture status post cephalomedullary nail fixation     Assessment:   77y.o. year old male with left IT fracture status post cephalomedullary nail fixation  Plan:      Patient is here today for follow-up follow-up of the after mentioned surgery. Patient still remains deconditioned secondary to his other medical comorbidities. I recommend he continue physical therapy at this time. PT has also been ordered for his left shoulder to continue range of motion strengthening there. Patient may follow-up in 6 months which will be 1 year out from surgery. He was amenable to all recommendations and was encouraged to call the office with any questions. Follow up:Return in about 6 months (around 11/26/2022). No orders of the defined types were placed in this encounter.          Orders Placed This Encounter   Procedures    XR FEMUR LEFT (MIN 2 VIEWS)     Standing Status:   Future     Number of Occurrences:   1     Standing Expiration Date:   5/26/2023       Electronically signed by Michael Kumar DO on 5/26/2022 at 12:23 PM

## 2022-11-07 ENCOUNTER — OFFICE VISIT (OUTPATIENT)
Dept: PRIMARY CARE CLINIC | Age: 77
End: 2022-11-07
Payer: COMMERCIAL

## 2022-11-07 VITALS
DIASTOLIC BLOOD PRESSURE: 67 MMHG | OXYGEN SATURATION: 99 % | HEART RATE: 70 BPM | TEMPERATURE: 97 F | WEIGHT: 181 LBS | SYSTOLIC BLOOD PRESSURE: 112 MMHG | BODY MASS INDEX: 25.97 KG/M2

## 2022-11-07 DIAGNOSIS — K21.9 GASTROESOPHAGEAL REFLUX DISEASE WITHOUT ESOPHAGITIS: ICD-10-CM

## 2022-11-07 DIAGNOSIS — K86.89 PANCREATIC INSUFFICIENCY: ICD-10-CM

## 2022-11-07 DIAGNOSIS — Z76.0 MEDICATION REFILL: ICD-10-CM

## 2022-11-07 DIAGNOSIS — Z91.81 AT HIGH RISK FOR FALLS: ICD-10-CM

## 2022-11-07 DIAGNOSIS — G25.81 RLS (RESTLESS LEGS SYNDROME): ICD-10-CM

## 2022-11-07 DIAGNOSIS — Z09 HOSPITAL DISCHARGE FOLLOW-UP: Primary | ICD-10-CM

## 2022-11-07 DIAGNOSIS — G62.9 NEUROPATHY: ICD-10-CM

## 2022-11-07 PROCEDURE — 3078F DIAST BP <80 MM HG: CPT | Performed by: NURSE PRACTITIONER

## 2022-11-07 PROCEDURE — 1123F ACP DISCUSS/DSCN MKR DOCD: CPT | Performed by: NURSE PRACTITIONER

## 2022-11-07 PROCEDURE — 1111F DSCHRG MED/CURRENT MED MERGE: CPT | Performed by: NURSE PRACTITIONER

## 2022-11-07 PROCEDURE — G8484 FLU IMMUNIZE NO ADMIN: HCPCS | Performed by: NURSE PRACTITIONER

## 2022-11-07 PROCEDURE — G8417 CALC BMI ABV UP PARAM F/U: HCPCS | Performed by: NURSE PRACTITIONER

## 2022-11-07 PROCEDURE — 3074F SYST BP LT 130 MM HG: CPT | Performed by: NURSE PRACTITIONER

## 2022-11-07 PROCEDURE — 1036F TOBACCO NON-USER: CPT | Performed by: NURSE PRACTITIONER

## 2022-11-07 PROCEDURE — G8427 DOCREV CUR MEDS BY ELIG CLIN: HCPCS | Performed by: NURSE PRACTITIONER

## 2022-11-07 PROCEDURE — 99214 OFFICE O/P EST MOD 30 MIN: CPT | Performed by: NURSE PRACTITIONER

## 2022-11-07 RX ORDER — FLUTICASONE PROPIONATE AND SALMETEROL 500; 50 UG/1; UG/1
POWDER RESPIRATORY (INHALATION)
COMMUNITY
Start: 2022-10-25

## 2022-11-07 RX ORDER — FUROSEMIDE 40 MG/1
1 TABLET ORAL
COMMUNITY
Start: 2022-04-12 | End: 2022-11-07 | Stop reason: SDUPTHER

## 2022-11-07 RX ORDER — AMOXICILLIN 250 MG
1 CAPSULE ORAL DAILY PRN
COMMUNITY
Start: 2022-09-15

## 2022-11-07 RX ORDER — GUAIFENESIN 600 MG/1
TABLET, EXTENDED RELEASE ORAL
Qty: 60 TABLET | Refills: 3 | Status: SHIPPED | OUTPATIENT
Start: 2022-11-07

## 2022-11-07 RX ORDER — ROPINIROLE 0.25 MG/1
TABLET, FILM COATED ORAL
Qty: 56 TABLET | Refills: 3 | Status: SHIPPED | OUTPATIENT
Start: 2022-11-07

## 2022-11-07 RX ORDER — CYCLOSPORINE 0.5 MG/ML
EMULSION OPHTHALMIC
COMMUNITY
Start: 2022-10-11

## 2022-11-07 RX ORDER — POLYVINYL ALCOHOL 14 MG/ML
2 SOLUTION/ DROPS OPHTHALMIC
COMMUNITY
Start: 2022-02-28

## 2022-11-07 RX ORDER — TIZANIDINE 4 MG/1
TABLET ORAL
COMMUNITY
Start: 2022-10-22 | End: 2022-11-11 | Stop reason: SDUPTHER

## 2022-11-07 RX ORDER — FUROSEMIDE 40 MG/1
40 TABLET ORAL DAILY
Qty: 60 TABLET | Refills: 2 | Status: SHIPPED | OUTPATIENT
Start: 2022-11-07

## 2022-11-07 RX ORDER — MODAFINIL 200 MG/1
TABLET ORAL
COMMUNITY
Start: 2022-10-17

## 2022-11-07 RX ORDER — GABAPENTIN 300 MG/1
300 CAPSULE ORAL 3 TIMES DAILY
Qty: 84 CAPSULE | Refills: 3 | Status: SHIPPED | OUTPATIENT
Start: 2022-11-07 | End: 2022-12-05

## 2022-11-07 RX ORDER — PANCRELIPASE 24000; 76000; 120000 [USP'U]/1; [USP'U]/1; [USP'U]/1
CAPSULE, DELAYED RELEASE PELLETS ORAL
Qty: 84 CAPSULE | Refills: 3 | Status: SHIPPED | OUTPATIENT
Start: 2022-11-07

## 2022-11-07 RX ORDER — FLUTICASONE PROPIONATE AND SALMETEROL 250; 50 UG/1; UG/1
POWDER RESPIRATORY (INHALATION)
COMMUNITY

## 2022-11-07 RX ORDER — PANTOPRAZOLE SODIUM 40 MG/1
40 TABLET, DELAYED RELEASE ORAL DAILY
Qty: 28 TABLET | Refills: 3 | Status: SHIPPED | OUTPATIENT
Start: 2022-11-07

## 2022-11-07 RX ORDER — SODIUM CHLORIDE 9 MG/ML
INJECTION, SOLUTION INTRAVENOUS
COMMUNITY
Start: 2022-08-30

## 2022-11-07 RX ORDER — ONDANSETRON 4 MG/1
TABLET, FILM COATED ORAL
COMMUNITY
Start: 2022-10-14

## 2022-11-07 SDOH — ECONOMIC STABILITY: FOOD INSECURITY: WITHIN THE PAST 12 MONTHS, YOU WORRIED THAT YOUR FOOD WOULD RUN OUT BEFORE YOU GOT MONEY TO BUY MORE.: NEVER TRUE

## 2022-11-07 SDOH — ECONOMIC STABILITY: FOOD INSECURITY: WITHIN THE PAST 12 MONTHS, THE FOOD YOU BOUGHT JUST DIDN'T LAST AND YOU DIDN'T HAVE MONEY TO GET MORE.: NEVER TRUE

## 2022-11-07 ASSESSMENT — PATIENT HEALTH QUESTIONNAIRE - PHQ9
SUM OF ALL RESPONSES TO PHQ QUESTIONS 1-9: 0
SUM OF ALL RESPONSES TO PHQ QUESTIONS 1-9: 0
2. FEELING DOWN, DEPRESSED OR HOPELESS: 0
SUM OF ALL RESPONSES TO PHQ QUESTIONS 1-9: 0
SUM OF ALL RESPONSES TO PHQ QUESTIONS 1-9: 0
1. LITTLE INTEREST OR PLEASURE IN DOING THINGS: 0
SUM OF ALL RESPONSES TO PHQ9 QUESTIONS 1 & 2: 0

## 2022-11-07 ASSESSMENT — SOCIAL DETERMINANTS OF HEALTH (SDOH): HOW HARD IS IT FOR YOU TO PAY FOR THE VERY BASICS LIKE FOOD, HOUSING, MEDICAL CARE, AND HEATING?: NOT HARD AT ALL

## 2022-11-07 NOTE — PROGRESS NOTES
Post-Discharge Transitional Care  Follow Up      Thea Gibbons   YOB: 1945    Date of Office Visit:  11/7/2022  Date of Hospital Admission: 1/28/22  Date of Hospital Discharge: 2/12/22  Risk of hospital readmission (high >=14%. Medium >=10%) :Readmission Risk Score: 27.2 ( )      Care management risk score Rising risk (score 2-5) and Complex Care (Scores >=6): No Risk Score On File     Non face to face  following discharge, date last encounter closed (first attempt may have been earlier): *No documented post hospital discharge outreach found in the last 14 days    Call initiated 2 business days of discharge: *No response recorded in the last 14 days    ASSESSMENT/PLAN:   Hospital discharge follow-up  -     AR DISCHARGE MEDS RECONCILED W/ CURRENT OUTPATIENT MED LIST  Medication refill  -     metoprolol tartrate (LOPRESSOR) 25 MG tablet; Take 0.5 tablets by mouth 2 times daily, Disp-60 tablet, R-2Normal  -     gabapentin (NEURONTIN) 300 MG capsule; Take 1 capsule by mouth 3 times daily for 28 days. , Disp-84 capsule, R-3Normal  -     rOPINIRole (REQUIP) 0.25 MG tablet; TAKE 1 TABLET BY MOUTH TWICE DAILY, Disp-56 tablet, R-3Normal  -     lipase-protease-amylase (CREON) 78146-75253 units delayed release capsule; TAKE 1 CAPSULE BY MOUTH THREE TIMES DAILY WITH MEALS, Disp-84 capsule, R-3Normal  -     guaiFENesin (MUCUS RELIEF) 600 MG extended release tablet; TAKE 1 TABLET BY MOUTH TWICE DAILY, Disp-60 tablet, R-3Normal  Neuropathy  -     gabapentin (NEURONTIN) 300 MG capsule; Take 1 capsule by mouth 3 times daily for 28 days. , Disp-84 capsule, R-3Normal  At high risk for falls  RLS (restless legs syndrome)  -     rOPINIRole (REQUIP) 0.25 MG tablet; TAKE 1 TABLET BY MOUTH TWICE DAILY, Disp-56 tablet, R-3Normal  Pancreatic insufficiency  -     lipase-protease-amylase (CREON) 59047-88455 units delayed release capsule; TAKE 1 CAPSULE BY MOUTH THREE TIMES DAILY WITH MEALS, Disp-84 capsule, R-3Normal    Medical Decision Making: low complexity  No follow-ups on file. Subjective:   HPI:  Follow up of Hospital problems/diagnosis(es): Discharged from [facility] on 10/28/22. Asking for refills. Ortho on 11/17/22 for final f/u, had a lamberto put in his femur. Advised to use wheelchair when out of the home. Seeing wound care for toe wound, present since August. Come to the home. His wife states it is almost fully healed. Inpatient course: Discharge summary reviewed- see chart. HOSPITAL COURSE:   Casimiro Quick is a 77 y.o. male who was admitted on 1/29/2022 s/p fall from [facility] after trying to reach his walker. This resulted in a left interprosthetic femur fx. He underwent left femur fracture IMN on 1/30/2022. He received a course of Keflex postop and was discharged on remainder on course (end date 2/16/2022). A prevena wound vac was used while inpatient and incision was healing well so it was removed prior to discharge. Labs and imaging were followed daily. The patient had some hyponatremia while inpatient which resolved with salt tabs and fluid restriction. His home afib meds were continued throughout admission. He did develop some diarrhea while hospitalized which resolved prior to discharge. He was found to be COVID (+) on admission and ID followed throughout admission, was in isolation until 2/7 and received sotrivimab for asymptomatic COVID in a high risk patient. At time of discharge, Casimiro Quick was tolerating a easy chew diet, having bowel movements, ambulating with assistance, had adequate analgesia on oral pain medications, and had no signs of symptoms of complications. He was deemed medically stable and discharged to SNF on 2/11/2022 with instructions to follow up with his PCP and orthopedic surgery. Pt expressed understanding of and agreement with DC plans.         Interval history/Current status: stable    Patient Active Problem List   Diagnosis    Chronic atrial fibrillation (HCC)    Dyslipidemia    Gastroesophageal reflux disease without esophagitis    Osteoarthritis of lumbar spine    OA (osteoarthritis) of knee, bilateral    Foot drop, right    Hallux valgus, acquired, bilateral    Hearing impairment    Essential hypertension    Slow transit constipation    MARIAMA on CPAP    COPD (chronic obstructive pulmonary disease) (HCC)    Chronic diastolic heart failure (HCC)    History of bariatric surgery including banding leading to esophageal stricture and aspiration    BPH (benign prostatic hyperplasia)    Myalgia    Atrial fibrillation with slow ventricular response ([address]    Pacemaker pocket hematoma, initial encounter    Pulmonary hypertension ([address]    Presence of permanent cardiac pacemaker    Fall at home, initial encounter    Left displaced femoral neck fracture ([address]    Retained orthopedic hardware    COVID-19    Elevated C-reactive protein (CRP)       Medications listed as ordered at the time of discharge from hospital     Medication List            Accurate as of November 7, 2022  2:14 PM. If you have any questions, ask your nurse or doctor. START taking these medications      gabapentin 300 MG capsule  Commonly known as: NEURONTIN  Take 1 capsule by mouth 3 times daily for 28 days.   Started by: JULIAN Godinez CNP     guaiFENesin 600 MG extended release tablet  Commonly known as: Mucus Relief  TAKE 1 TABLET BY MOUTH TWICE DAILY  Started by: JULIAN Godinez CNP            CONTINUE taking these medications      Armodafinil 200 MG Tabs     ascorbic acid 500 MG tablet  Commonly known as: VITAMIN C  Take 1 tablet by mouth daily     Boost Liqd  1 can twice a day for meals     Breo Ellipta 200-25 MCG/INH Aepb inhaler  Generic drug: Fluticasone furoate-vilanterol     carboxymethylcellulose 0.5 % Soln ophthalmic solution  Commonly known as: REFRESH PLUS     Creon 48517-83583 units delayed release capsule  Generic drug: lipase-protease-amylase  TAKE 1 CAPSULE BY MOUTH THREE TIMES DAILY WITH MEALS     docusate sodium 100 MG capsule  Commonly known as: COLACE  TAKE 1 CAPSULE BY MOUTH TWICE DAILY AS NEEDED FOR CONSTIPATION     ferrous sulfate 325 (65 Fe) MG EC tablet  Commonly known as: FE TABS 325  Take 1 tablet by mouth 2 times daily (with meals)     furosemide 40 MG tablet  Commonly known as: LASIX     Handicap Placard Misc  by Does not apply route     Incontinence Brief Large Misc  To use as directed.  Use 3x a day     ipratropium-albuterol 0.5-2.5 (3) MG/3ML Soln nebulizer solution  Commonly known as: DUONEB     metoprolol tartrate 25 MG tablet  Commonly known as: LOPRESSOR  Take 0.5 tablets by mouth 2 times daily     midodrine 5 MG tablet  Commonly known as: PROAMATINE  Take 1 tablet by mouth every 8 hours     modafinil 200 MG tablet  Commonly known as: PROVIGIL     Multi-Vitamins Tabs  TAKE 1 TABLET BY MOUTH DAILY     ondansetron 4 MG tablet  Commonly known as: ZOFRAN     pantoprazole 40 MG tablet  Commonly known as: PROTONIX  TAKE 1 TABLET BY MOUTH DAILY     polyvinyl alcohol 1.4 % ophthalmic solution  Commonly known as: LIQUIFILM TEARS     Restasis 0.05 % ophthalmic emulsion  Generic drug: cycloSPORINE     rivaroxaban 20 MG Tabs tablet  Commonly known as: Xarelto  TAKE 1 TABLET BY MOUTH DAILY     rOPINIRole 0.25 MG tablet  Commonly known as: REQUIP  TAKE 1 TABLET BY MOUTH TWICE DAILY     SM Lubricant Eye Drops 0.4-0.3 % ophthalmic solution  Generic drug: polyethyl glycol-propyl glycol 0.4-0.3 %  PLACE 1 DROP INTO BOTH EYES FOUR TIMES DAILY AS NEEDED FOR DRY EYES     sodium chloride 0.9 % infusion     tamsulosin 0.4 MG capsule  Commonly known as: FLOMAX  Take 1 capsule by mouth daily     tiZANidine 4 MG tablet  Commonly known as: ZANAFLEX     Tri-[employer] Orthotics Mens Misc  Provide insurance covered orthotics shoes, wear daily     Ventolin  (90 Base) MCG/ACT inhaler  Generic drug: albuterol sulfate HFA     vitamin B-12 100 MCG tablet  Commonly known as: CYANOCOBALAMIN  TAKE 1 TABLET BY MOUTH DAILY AS NEEDED FOR FATIGUE     Wixela Inhub 500-50 MCG/ACT Aepb diskus inhaler  Generic drug: fluticasone-salmeterol            STOP taking these medications      cetirizine 10 MG tablet  Commonly known as: ZYRTEC  Stopped by: JULIAN Garibay CNP               Where to Get Your Medications        These medications were sent to [address] [place] [address]      Phone: [phone]   Creon 39352-32234 units delayed release capsule  gabapentin 300 MG capsule  guaiFENesin 600 MG extended release tablet  metoprolol tartrate 25 MG tablet  rOPINIRole 0.25 MG tablet           Medications marked \"taking\" at this time  Outpatient Medications Marked as Taking for the 11/7/22 encounter (Office Visit) with JULIAN Garibay CNP   Medication Sig Dispense Refill    fluticasone-salmeterol (Larwance Flair) 500-50 MCG/ACT AEPB diskus inhaler Inhale into the lungs      furosemide (LASIX) 40 MG tablet Take 1 tablet by mouth      polyvinyl alcohol (LIQUIFILM TEARS) 1.4 % ophthalmic solution 2 drops      metoprolol tartrate (LOPRESSOR) 25 MG tablet Take 0.5 tablets by mouth 2 times daily 60 tablet 2    gabapentin (NEURONTIN) 300 MG capsule Take 1 capsule by mouth 3 times daily for 28 days.  84 capsule 3    rOPINIRole (REQUIP) 0.25 MG tablet TAKE 1 TABLET BY MOUTH TWICE DAILY 56 tablet 3    lipase-protease-amylase (CREON) 30123-71184 units delayed release capsule TAKE 1 CAPSULE BY MOUTH THREE TIMES DAILY WITH MEALS 84 capsule 3    guaiFENesin (MUCUS RELIEF) 600 MG extended release tablet TAKE 1 TABLET BY MOUTH TWICE DAILY 60 tablet 3    midodrine (PROAMATINE) 5 MG tablet Take 1 tablet by mouth every 8 hours 90 tablet 0    docusate sodium (COLACE) 100 MG capsule TAKE 1 CAPSULE BY MOUTH TWICE DAILY AS NEEDED FOR CONSTIPATION 60 capsule 0    vitamin B-12 (CYANOCOBALAMIN) 100 MCG tablet TAKE 1 TABLET BY MOUTH DAILY AS NEEDED FOR FATIGUE 30 tablet 5    rivaroxaban (XARELTO) 20 MG TABS tablet TAKE 1 TABLET BY MOUTH DAILY 28 tablet 5    Multiple Vitamin (MULTI-VITAMINS) TABS TAKE 1 TABLET BY MOUTH DAILY 28 tablet 5    pantoprazole (PROTONIX) 40 MG tablet TAKE 1 TABLET BY MOUTH DAILY 28 tablet 3    tamsulosin (FLOMAX) 0.4 MG capsule Take 1 capsule by mouth daily 30 capsule 5    Nutritional Supplements (BOOST) LIQD 1 can twice a day for meals 60 Can 2    ferrous sulfate (FE TABS 325) 325 (65 Fe) MG EC tablet Take 1 tablet by mouth 2 times daily (with meals) 90 tablet 3    SM LUBRICANT EYE DROPS 0.4-0.3 % ophthalmic solution PLACE 1 DROP INTO BOTH EYES FOUR TIMES DAILY AS NEEDED FOR DRY EYES 15 mL 3    Handicap Placard MISC by Does not apply route 1 each 0    Incontinence Supply Disposable (INCONTINENCE BRIEF LARGE) MISC To use as directed. Use 3x a day 90 each 3    Foot Care Products (TRI-BALANCE ORTHOTICS MENS) MISC Provide insurance covered orthotics shoes, wear daily 2 each 0        Medications patient taking as of now reconciled against medications ordered at time of hospital discharge: Yes        Objective:    /67   Pulse 70   Temp 97 °F (36.1 °C) (Temporal)   Wt 181 lb (82.1 kg)   SpO2 99%   BMI 25.97 kg/m²   General Appearance: alert and oriented to person, place and time, well-developed and well-nourished, in no acute distress  Pulmonary/Chest: clear to auscultation bilaterally- no wheezes, rales or rhonchi, normal air movement, no respiratory distress  Abdomen: soft, non-tender, non-distended, normal bowel sounds, no masses or organomegaly      An electronic signature was used to authenticate this note. --JULIAN Ledezma - CNP        On the basis of positive falls risk screening, assessment and plan is as follows: patient declines any further evaluation/treatment for increased falls risk.

## 2022-11-07 NOTE — PROGRESS NOTES
Visit Information    Have you changed or started any medications since your last visit including any over-the-counter medicines, vitamins, or herbal medicines? no   Are you having any side effects from any of your medications? -  no  Have you stopped taking any of your medications? Is so, why? -    yes  Have you seen any other physician or provider since your last visit? No  Have you had any other diagnostic tests since your last visit? No  Have you been seen in the emergency room and/or had an admission to a hospital since we last saw you? No  Have you had your routine dental cleaning in the past 6 months? no    Have you activated your [employer] account? If not, what are your barriers?  Yes     Patient Care Team:  JULIAN Abbott CNP as PCP - General (Family Medicine)  JULIAN Abbott CNP as PCP - [address] [place] [address] Dr Bowers Provider  [address]  Mikki Cheung MD as Consulting Physician (Gastroenterology)  Rolan Castleman, MD as Consulting Physician (Cardiology)  Olive Enriquez MD as Consulting Physician (Pulmonology)  Maribel Matos MD as Consulting Physician (Urology)    Medical History Review  Past Medical, Family, and Social History reviewed and does not contribute to the patient presenting condition    Health Maintenance   Topic Date Due    COVID-19 Vaccine (1) Never done    Shingles vaccine (1 of 2) Never done    Flu vaccine (1) 08/01/2022    Depression Screen  10/21/2022    DTaP/Tdap/Td vaccine (2 - Td or Tdap) 04/22/2029    Pneumococcal 65+ years Vaccine  Completed    Hepatitis C screen  Completed    Hepatitis A vaccine  Aged Out    Hib vaccine  Aged Out    Meningococcal (ACWY) vaccine  Aged Out

## 2022-11-10 ENCOUNTER — TELEPHONE (OUTPATIENT)
Dept: PRIMARY CARE CLINIC | Age: 77
End: 2022-11-10

## 2022-11-10 NOTE — TELEPHONE ENCOUNTER
Ekaterina with [facility] called in requesting last visit note to be faxed to [phone]. Writer faxed visit note to listed number.

## 2022-11-11 DIAGNOSIS — S72.142D CLOSED 2-PART INTERTROCHANTERIC FRACTURE OF LEFT FEMUR WITH ROUTINE HEALING, SUBSEQUENT ENCOUNTER: Primary | ICD-10-CM

## 2022-11-11 RX ORDER — TIZANIDINE 4 MG/1
4 TABLET ORAL EVERY 8 HOURS PRN
Qty: 30 TABLET | Refills: 0 | Status: SHIPPED | OUTPATIENT
Start: 2022-11-11

## 2022-11-11 RX ORDER — CETIRIZINE HYDROCHLORIDE 10 MG/1
TABLET ORAL
Qty: 30 TABLET | Refills: 2 | Status: SHIPPED | OUTPATIENT
Start: 2022-11-11

## 2022-11-11 NOTE — TELEPHONE ENCOUNTER
Sal Juarez is calling to request a refill on the following medication(s):    Last Visit Date (If Applicable):  86/0/4926    Next Visit Date:    2/7/2023    Medication Request:  Requested Prescriptions     Pending Prescriptions Disp Refills    tiZANidine (ZANAFLEX) 4 MG tablet 30 tablet      Sig: Take 1 tablet by mouth every 8 hours as needed    cetirizine (ZYRTEC) 10 MG tablet 30 tablet 2     Sig: TAKE 1 TABLET EVERY DAY

## 2022-11-16 ENCOUNTER — OFFICE VISIT (OUTPATIENT)
Dept: ORTHOPEDIC SURGERY | Age: 77
End: 2022-11-16
Payer: COMMERCIAL

## 2022-11-16 VITALS — BODY MASS INDEX: 25.91 KG/M2 | WEIGHT: 181 LBS | HEIGHT: 70 IN

## 2022-11-16 DIAGNOSIS — M25.552 LEFT HIP PAIN: Primary | ICD-10-CM

## 2022-11-16 PROCEDURE — 1036F TOBACCO NON-USER: CPT

## 2022-11-16 PROCEDURE — G8484 FLU IMMUNIZE NO ADMIN: HCPCS

## 2022-11-16 PROCEDURE — G8417 CALC BMI ABV UP PARAM F/U: HCPCS

## 2022-11-16 PROCEDURE — 1123F ACP DISCUSS/DSCN MKR DOCD: CPT

## 2022-11-16 PROCEDURE — 99213 OFFICE O/P EST LOW 20 MIN: CPT

## 2022-11-16 PROCEDURE — G8427 DOCREV CUR MEDS BY ELIG CLIN: HCPCS

## 2022-11-16 NOTE — PROGRESS NOTES
[address]  [address] [facility] [address]  Dept: [phone]    Ambulatory Orthopedic Office Visit      CHIEF COMPLAINT:    Chief Complaint   Patient presents with    Follow-up     LEFT FEMUR FX : DOI 01/28/2022       HISTORY OF PRESENT ILLNESS:      Date of surgery 1/28/2022: Left hip femoral nail revision with Dr. Clarice Wagner    The patient is a 69 y.o.male who is being seen 10 months postop femoral nail revision with Dr. Clarice Wagner. Patient seen and evaluated today in clinic with daughter in the room. Patient states he is doing well and has mild pain about his left hip. New radiographs were obtained today. He has been actively involved in physical therapy working on his gait and balance. He states he has pain in his left hip that is worse following physical therapy. He states it is 2-4/10 in intensity. He is not taking any medications currently to deal with the pain. He denies use of ice. Patient ambulates at baseline with a walker. He denies any new trauma, numbness, tingling in the left lower extremity.       Past Medical History:    Past Medical History:   Diagnosis Date    Acute superficial gastritis without hemorrhage 05/26/2018    Anastomotic stricture of stomach     Asthma     Atrial fibrillation ([address]     On Xarelto 6/27/2020    Calculus of gallbladder without cholecystitis without obstruction 05/02/2017    Chronic diastolic heart failure ([address] 11/17/2017    Chronic rhinosinusitis 04/13/2015    Class 2 severe obesity due to excess calories with serious comorbidity and body mass index (BMI) of 36.0 to 36.9 in adult [facility]) 11/17/2017    COPD (chronic obstructive pulmonary disease) ([facility])     E. coli septicemia ([facility])     E. coli UTI     Foot drop, right 07/10/2012    Fracture of femur ([facility] 10/23/2016    Gait disorder 07/20/2016    Gastric out let obstruction     GERD (gastroesophageal reflux disease)     Hallux valgus, acquired, bilateral 06/24/2015    Hyperlipidemia     Hypertension     Impaired hearing 04/13/2015    Internal hemorrhoids 01/03/2017    Lymphedema of both lower extremities 06/24/2015    Nausea & vomiting 11/16/2018    OA (osteoarthritis) of knee, bilateral 12/11/2006    Obesity     MARIAMA on CPAP 05/02/2017    Osteoarthritis     Osteoarthritis of lumbar spine 12/13/2007     replace inactive diagnosis    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 coli ([facility]     Due to UTI     Sick sinus syndrome ([address]     Simple chronic bronchitis ([facility] 04/10/2018    Slow transit constipation 11/03/2016    Unspecified sleep apnea     UTI (urinary tract infection)        Past Surgical History:    Past Surgical History:   Procedure Laterality Date    CARPAL TUNNEL RELEASE      bilateral    COLONOSCOPY      COLONOSCOPY N/A 04/05/2021    COLONOSCOPY DIAGNOSTIC performed by Jordon Amaro MD at [place] [address]  01/02/2019    [facility] 01/02/2019    CYSTOSCOPY performed by Alina Presley MD at [address] Left 1/30/2022    LEFT CEPHALOMEDULLARY NAIL INSERTION performed by Michell Yoder DO at [facility] right index finger    GASTRIC BYPASS SURGERY  1985    vertical banded gastroplasty    HEMORRHOID SURGERY      HIP SURGERY Left 01/30/2022     LEFT CEPHALOMEDULLARY NAIL INSERTION    HIP SURGERY Left 01/30/2022    LEFT CEPHALOMEDULLARY NAIL INSERTION (Left Hip)     JOINT REPLACEMENT  2004 & 2005    bilateral knees    KNEE SURGERY Left 01/30/2022    KNEE TOTAL ARTHROPLASTY REVISION, REMOVAL OF INTRAMEDULLARY NAIL     PACEMAKER INSERTION  04/09/2021    [facility], placed by Dr. Kim Pinto EGD [address] [address] N/A 08/16/2018    EGD FOREIGN BODY REMOVAL performed by Kamryn Hamm MD at [facility] EGD TRANSORAL BIOPSY SINGLE/MULTIPLE N/A 05/25/2018    EGD BIOPSY performed by Rashaad Nj DO at [facility]    REVISION TOTAL KNEE ARTHROPLASTY Left 01/30/2022    KNEE TOTAL ARTHROPLASTY REVISION, REMOVAL OF INTRAMEDULLARY NAIL (Left     REVISION TOTAL KNEE ARTHROPLASTY Left 1/30/2022    KNEE TOTAL ARTHROPLASTY REVISION, REMOVAL OF INTRAMEDULLARY NAIL performed by Bunny Gomez DO at [address] [place] [address]       left shoulder    UPPER GASTROINTESTINAL ENDOSCOPY  08/13/2018    removal of food bolus    UPPER GASTROINTESTINAL ENDOSCOPY N/A 08/13/2018    EGD FOREIGN BODY REMOVAL performed by Muriel Lutz DO at [facility] 08/13/2018    EGD BIOPSY performed by Muriel Lutz DO at [place] [address]  08/16/2018    egd with balloon dilitation    UPPER GASTROINTESTINAL ENDOSCOPY  08/16/2018    EGD DILATION BALLOON performed by Musa Pond MD at [facility] 10/01/2018    EGD BIOPSY performed by Pradip Kenny MD at [place] [address]  10/01/2018    EGD DILATION BALLOON performed by Pradip Kenny MD at [place] [address] N/A 11/19/2018    EGD DILATION BALLOON performed by Pradip Kenny MD at [place] [address] N/A 10/15/2020    EGD SUBMUCOSAL/BOTOX INJECTION tattoo  performed by Mina Riley MD at [place] [address] 7/16/2021    EGD BIOPSY performed by Arya Minor MD at [address] [place] [address]       Current Medications:   Current Outpatient Medications   Medication Sig Dispense Refill    tiZANidine (ZANAFLEX) 4 MG tablet Take 1 tablet by mouth every 8 hours as needed (spasms) 30 tablet 0    cetirizine (ZYRTEC) 10 MG tablet TAKE 1 TABLET EVERY DAY 30 tablet 2    RESTASIS 0.05 % ophthalmic emulsion       fluticasone-salmeterol (WIXELA INHUB) 500-50 MCG/ACT AEPB diskus inhaler Inhale into the lungs      modafinil (PROVIGIL) 200 MG tablet       ondansetron (ZOFRAN) 4 MG tablet       polyvinyl alcohol (LIQUIFILM TEARS) 1.4 % ophthalmic solution 2 drops      sodium chloride 0.9 % infusion       metoprolol tartrate (LOPRESSOR) 25 MG tablet Take 0.5 tablets by mouth 2 times daily 60 tablet 2    gabapentin (NEURONTIN) 300 MG capsule Take 1 capsule by mouth 3 times daily for 28 days.  84 capsule 3    rOPINIRole (REQUIP) 0.25 MG tablet TAKE 1 TABLET BY MOUTH TWICE DAILY 56 tablet 3    lipase-protease-amylase (CREON) 49730-07416 units delayed release capsule TAKE 1 CAPSULE BY MOUTH THREE TIMES DAILY WITH MEALS 84 capsule 3    guaiFENesin (MUCUS RELIEF) 600 MG extended release tablet TAKE 1 TABLET BY MOUTH TWICE DAILY 60 tablet 3    fluticasone-salmeterol (ADVAIR) 250-50 MCG/ACT AEPB diskus inhaler 1 puff      senna-docusate (SENNA-PLUS) 8.6-50 MG per tablet Take 1 tablet by mouth daily as needed      furosemide (LASIX) 40 MG tablet Take 1 tablet by mouth daily 60 tablet 2    pantoprazole (PROTONIX) 40 MG tablet Take 1 tablet by mouth daily 28 tablet 3    midodrine (PROAMATINE) 5 MG tablet Take 1 tablet by mouth every 8 hours 90 tablet 0    docusate sodium (COLACE) 100 MG capsule TAKE 1 CAPSULE BY MOUTH TWICE DAILY AS NEEDED FOR CONSTIPATION 60 capsule 0    vitamin B-12 (CYANOCOBALAMIN) 100 MCG tablet TAKE 1 TABLET BY MOUTH DAILY AS NEEDED FOR FATIGUE 30 tablet 5    rivaroxaban (XARELTO) 20 MG TABS tablet TAKE 1 TABLET BY MOUTH DAILY 28 tablet 5    Multiple Vitamin (MULTI-VITAMINS) TABS TAKE 1 TABLET BY MOUTH DAILY 28 tablet 5    tamsulosin (FLOMAX) 0.4 MG capsule Take 1 capsule by mouth daily 30 capsule 5    Fluticasone furoate-vilanterol (BREO ELLIPTA) 200-25 MCG/INH AEPB inhaler INHALE 1 PUFF INTO THE LUNGS DAILY **RINSE MOUTH AFTER EACH USE**      Nutritional Supplements (BOOST) LIQD 1 can twice a day for meals 60 Can 2    albuterol sulfate HFA (VENTOLIN HFA) 108 (90 Base) MCG/ACT inhaler Inhale 2 puffs into the lungs every 6 hours as needed for Wheezing or Shortness of Breath carboxymethylcellulose (REFRESH PLUS) 0.5 % SOLN ophthalmic solution Apply 1 drop to eye 4 times daily      ascorbic acid (VITAMIN C) 500 MG tablet Take 1 tablet by mouth daily (Patient not taking: Reported on 2022) 30 tablet 2    ferrous sulfate (FE TABS 325) 325 (65 Fe) MG EC tablet Take 1 tablet by mouth 2 times daily (with meals) 90 tablet 3    SM LUBRICANT EYE DROPS 0.4-0.3 % ophthalmic solution PLACE 1 DROP INTO BOTH EYES FOUR TIMES DAILY AS NEEDED FOR DRY EYES 15 mL 3    Handicap Placard MISC by Does not apply route 1 each 0    Incontinence Supply Disposable (INCONTINENCE BRIEF LARGE) MISC To use as directed. Use 3x a day 90 each 3    Foot Care Products (TRI-BALANCE ORTHOTICS MENS) MISC Provide insurance covered orthotics shoes, wear daily 2 each 0    ipratropium-albuterol (DUONEB) 0.5-2.5 (3) MG/3ML SOLN nebulizer solution Inhale 1 vial into the lungs every 4 hours as needed       Armodafinil 200 MG TABS Take 1 tablet by mouth 2 times daily. No current facility-administered medications for this visit.        Allergies:    Darvocet [propoxyphene n-acetaminophen], Other, Oxycodone-acetaminophen, and Propoxyphene    Social History:   Social History     Socioeconomic History    Marital status:      Spouse name: Not on file    Number of children: Not on file    Years of education: Not on file    Highest education level: Not on file   Occupational History    Not on file   Tobacco Use    Smoking status: Former     Packs/day: 1.00     Years: 20.00     Pack years: 20.00     Types: Cigarettes     Quit date: 1976     Years since quittin.9    Smokeless tobacco: Never   Vaping Use    Vaping Use: Never used   Substance and Sexual Activity    Alcohol use: No    Drug use: No    Sexual activity: Not on file   Other Topics Concern    Not on file   Social History Narrative    Not on file     Social Determinants of Health     Financial Resource Strain: Low Risk     Difficulty of Paying Living Expenses: Not hard at all   Food Insecurity: No Food Insecurity    Worried About Running Out of Food in the Last Year: Never true    Ran Out of Food in the Last Year: Never true   Transportation Needs: Not on file   Physical Activity: Not on file   Stress: Not on file   Social Connections: Not on file   Intimate Partner Violence: Not on file   Housing Stability: Not on file       Family History:  Family History   Problem Relation Age of Onset    Cancer Mother     Heart Attack Father          REVIEW OF SYSTEMS:    Constitutional: Negative for fever and chills. HENT: Negative for congestion or drainage   Eyes: Negative for blurred vision and double vision. Respiratory: Negative for cough, shortnessof breath and wheezing. Cardiovascular: Negative for chest pain and palpitations. Gastrointestinal: Negative for nausea. Negative for vomiting. Musculoskeletal: Positive for myalgias and joint pain. Skin:Negative for itching and rash. Neurological: Negative for dizziness, sensory change and headaches. Psychiatric/Behavioral: Negative for depression and suicidal ideas. PHYSICAL EXAM:  Ht 5' 10\" (1.778 m)   Wt 181 lb (82.1 kg)   BMI 25.97 kg/m²   Physical Exam  Gen: alert and oriented  Psych:  Appropriate affect; Appropriate knowledge base; Appropriate mood; No hallucinations; Head: normocephalic atraumatic   Cardiovascular:Regular rate, no dependent edema, distal pulses 2+  Respiratory: Chest symmetric, no accessory muscle use, normal respirations  Ortho Exam  Extremity:    Left hip: Examination of the left hip demonstrates a well-healed surgical site with some subcutaneous adhesions. The surgical site is well-healed. Patient is tender to palpation about the surgical incision and over the greater trochanter and retained hardware in the proximal femur. Range of motion limited secondary to pain. FHL/GS motor intact. AFO brace intact to left lower extremity.     Gross sensory intact to the left lower extremity. Left foot is warm and well-perfused. Radiology:   Left hip/femur radiographs  History:   Status post femoral nail revision 1/28/2022  Comparison:   Left femur radiographs obtained 6/22/2022    Findings:   4 radiographic views of the left femur AP proximal, AP distal, lateral distal, lateral proximal demonstrating retained femoral nail hardware with stable total knee arthroplasty. The femoral nail hardware shows no lucency or evidence of loosening. No hardware failure noted on the femoral nail. The femoral head plate appears to have not shifted when compared to previous radiographs obtained on 6/22/2022. Left total knee arthroplasty demonstrates appropriate alignment without lucency or failure. Diffuse osteoarthritis of the left hip. Well-healed femoral shaft fracture with large callus. No evidence of acute osseous abnormalities or fractures. Impression:  Stable retained femoral hardware with stable total knee arthroplasty. Degenerative changes left hip. No acute osseous fracture. ASSESSMENT:   No diagnosis found. PLAN:       Mr. Payal Valerio is seen and evaluated in clinic today with his daughter in the room. He is 10 months out of revision femoral nail. Patient states that he is doing well he is continue to work with physical therapy to improve his ambulation and balance. He states that his left hip pain is mild and is worse with his physical therapy. We discussed treatment options for his left hip pain including corticosteroid injection to aid in his pain tolerance with physical therapy. We also discussed options for conservative treatment such as ice activity modification and nonsteroidal anti-inflammatories. He states that he avoids anti-inflammatories due to GI issues. At this point time he would like to treat his pain with ice and activity modification.   Given the patient is progressing well without hardware lucency or failure would like the patient to continue with his physical therapy and call us as needed. The patient voices understanding and agrees with this plan. -Ice and activity modification to manage left hip pain. - Follow-up in clinic as needed    Return if symptoms worsen or fail to improve. No orders of the defined types were placed in this encounter. No orders of the defined types were placed in this encounter. Reviewed Subjective section with patient who did agree and confirmed everything documented. Discussed plan and patient expressed understanding of diagnosis andprognosis with plan as stated. All questions answered.      John Hickey MD  Orthopedic Surgery Resident, PGY-1  [facility]

## 2022-12-06 ENCOUNTER — OFFICE VISIT (OUTPATIENT)
Dept: PRIMARY CARE CLINIC | Age: 77
End: 2022-12-06
Payer: COMMERCIAL

## 2022-12-06 VITALS
HEART RATE: 70 BPM | OXYGEN SATURATION: 97 % | RESPIRATION RATE: 16 BRPM | WEIGHT: 200 LBS | HEIGHT: 70 IN | SYSTOLIC BLOOD PRESSURE: 124 MMHG | BODY MASS INDEX: 28.63 KG/M2 | DIASTOLIC BLOOD PRESSURE: 76 MMHG

## 2022-12-06 DIAGNOSIS — S91.109D NON-HEALING OPEN WOUND OF TOE, SUBSEQUENT ENCOUNTER: Primary | ICD-10-CM

## 2022-12-06 DIAGNOSIS — L03.818 CELLULITIS OF OTHER SPECIFIED SITE: ICD-10-CM

## 2022-12-06 DIAGNOSIS — Z76.0 MEDICATION REFILL: ICD-10-CM

## 2022-12-06 DIAGNOSIS — Z74.09 IMMOBILITY: ICD-10-CM

## 2022-12-06 DIAGNOSIS — R09.81 NASAL CONGESTION: ICD-10-CM

## 2022-12-06 PROCEDURE — 99213 OFFICE O/P EST LOW 20 MIN: CPT | Performed by: NURSE PRACTITIONER

## 2022-12-06 PROCEDURE — 3074F SYST BP LT 130 MM HG: CPT | Performed by: NURSE PRACTITIONER

## 2022-12-06 PROCEDURE — 3078F DIAST BP <80 MM HG: CPT | Performed by: NURSE PRACTITIONER

## 2022-12-06 PROCEDURE — 1123F ACP DISCUSS/DSCN MKR DOCD: CPT | Performed by: NURSE PRACTITIONER

## 2022-12-06 RX ORDER — CEPHALEXIN 500 MG/1
500 CAPSULE ORAL 4 TIMES DAILY
Qty: 28 CAPSULE | Refills: 0 | Status: SHIPPED | OUTPATIENT
Start: 2022-12-06 | End: 2022-12-13

## 2022-12-06 NOTE — PROGRESS NOTES
Pt is here to discuss great big toe wound, referral to podiatry possible referral to wound care and infectious disease      Visit Information    Have you changed or started any medications since your last visit including any over-the-counter medicines, vitamins, or herbal medicines? no   Have you stopped taking any of your medications? Is so, why? -  no  Are you having any side effects from any of your medications? - no    Have you seen any other physician or provider since your last visit?  no   Have you had any other diagnostic tests since your last visit?  no   Have you been seen in the emergency room and/or had an admission in a hospital since we last saw you?  no   Have you had your routine dental cleaning in the past 6 months?  no     Do you have an active MyChart account? If no, what is the barrier?   Yes    Patient Care Team:  JULIAN Kolb CNP as PCP - General (Family Medicine)  JULIAN Kolb CNP as PCP - [address]  Pradip Kenny MD as Consulting Physician (Gastroenterology)  Azul Baca MD as Consulting Physician (Cardiology)  Marcin Hooker MD as Consulting Physician (Pulmonology)  Leigha Evans MD as Consulting Physician (Urology)    Medical History Review  Past Medical, Family, and Social History reviewed and does contribute to the patient presenting condition    Health Maintenance   Topic Date Due    COVID-19 Vaccine (1) Never done    Shingles vaccine (1 of 2) Never done    Flu vaccine (1) 08/01/2022    Annual Wellness Visit (AWV)  11/07/2022    Depression Screen  11/07/2023    DTaP/Tdap/Td vaccine (2 - Td or Tdap) 04/22/2029    Pneumococcal 65+ years Vaccine  Completed    Hepatitis C screen  Completed    Hepatitis A vaccine  Aged Out    Hib vaccine  Aged Out    Meningococcal (ACWY) vaccine  Aged Out

## 2022-12-06 NOTE — PROGRESS NOTES
[facility]  [address] [address]  Dept: [phone]       Sal Juarez is a 69 y.o. male who presents today for his  medical conditions/complaintsas noted below. Sal Juarez is c/o of Wound Check      HPI:     HPI    Patient is here for wound check. Has chronic nonhealing right great toe wound for which she is receiving in-home wound care services. Per home care nurse, she was concerned with increase in swelling, tenderness, and erythema extending up the toe to the top of her foot. The wound does have a foul odor and presence of some purulent drainage. Patient denies any fever or new symptoms generalized malaise. Patient's wife also has a list with multiple concerns noted. Patient has been having bloody sinus drainage for the last 2 months. States drainage is stringy and clot-like and happens at least once per day. Patient is also having generalized increased swelling to bilateral lower extremities with 2+ pitting edema. The patient denies any change in breathing or shortness of breath, however, the wife states she notices the patient is working harder to breathe. Patient does have a follow-up with cardiology next week. She is also requesting DME orders for a chair lift and bedside commode due to patient's overall limited mobility and extensive list of chronic medical conditions.       Past Medical History:   Diagnosis Date    Acute superficial gastritis without hemorrhage 05/26/2018    Anastomotic stricture of stomach     Asthma     Atrial fibrillation ([address]     On Xarelto 6/27/2020    Calculus of gallbladder without cholecystitis without obstruction 05/02/2017    Chronic diastolic heart failure ([address] 11/17/2017    Chronic rhinosinusitis 04/13/2015    Class 2 severe obesity due to excess calories with serious comorbidity and body mass index (BMI) of 36.0 to 36.9 in adult [facility]) 11/17/2017 COPD (chronic obstructive pulmonary disease) ([facility])     E. coli septicemia ([facility])     E. coli UTI     Foot drop, right 07/10/2012    Fracture of femur ([facility] 10/23/2016    Gait disorder 07/20/2016    Gastric out let obstruction     GERD (gastroesophageal reflux disease)     Hallux valgus, acquired, bilateral 06/24/2015    Hyperlipidemia     Hypertension     Impaired hearing 04/13/2015    Internal hemorrhoids 01/03/2017    Lymphedema of both lower extremities 06/24/2015    Nausea & vomiting 11/16/2018    OA (osteoarthritis) of knee, bilateral 12/11/2006    Obesity     MARIAMA on CPAP 05/02/2017    Osteoarthritis     Osteoarthritis of lumbar spine 12/13/2007     replace inactive diagnosis    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 coli ([facility]     Due to UTI     Sick sinus syndrome ([facility])     Simple chronic bronchitis ([facility] 04/10/2018    Slow transit constipation 11/03/2016    Unspecified sleep apnea     UTI (urinary tract infection)       Past Surgical History:   Procedure Laterality Date    CARPAL TUNNEL RELEASE      bilateral    COLONOSCOPY      COLONOSCOPY N/A 04/05/2021    COLONOSCOPY DIAGNOSTIC performed by Jordon Amaro MD at [address]  01/02/2019    by Dr. Bazzi Dears N/A 01/02/2019    CYSTOSCOPY performed by Alina Presley MD at [address] [facility] Left 1/30/2022    LEFT CEPHALOMEDULLARY NAIL INSERTION performed by Michell Yoder DO at [address] [facility] right index finger    GASTRIC BYPASS SURGERY  1985    vertical banded gastroplasty    HEMORRHOID SURGERY      HIP SURGERY Left 01/30/2022     LEFT CEPHALOMEDULLARY NAIL INSERTION    HIP SURGERY Left 01/30/2022    LEFT CEPHALOMEDULLARY NAIL INSERTION (Left Hip)     JOINT REPLACEMENT  2004 & 2005    bilateral knees    KNEE SURGERY Left 01/30/2022    KNEE TOTAL ARTHROPLASTY REVISION, REMOVAL OF INTRAMEDULLARY NAIL     PACEMAKER INSERTION  04/09/2021    St. Pak placed by Dr. Mirella Hernandez EGD [address] N/A 08/16/2018    EGD FOREIGN BODY REMOVAL performed by Emerald Rivas MD at [address] EGD TRANSORAL BIOPSY SINGLE/MULTIPLE N/A 05/25/2018    EGD BIOPSY performed by Maximilian Muñoz DO at [address] [facility] Left 01/30/2022    KNEE TOTAL ARTHROPLASTY REVISION, REMOVAL OF INTRAMEDULLARY NAIL (Left     REVISION TOTAL KNEE ARTHROPLASTY Left 1/30/2022    KNEE TOTAL ARTHROPLASTY REVISION, REMOVAL OF INTRAMEDULLARY NAIL performed by John Ruano DO at [address]       left shoulder    UPPER GASTROINTESTINAL ENDOSCOPY  08/13/2018    removal of food bolus    UPPER GASTROINTESTINAL ENDOSCOPY N/A 08/13/2018    EGD FOREIGN BODY REMOVAL performed by Maximilian Muñoz DO at [address] [facility] N/A 08/13/2018    EGD BIOPSY performed by Maximilian Muñoz DO at [address] [place] [address]  08/16/2018    egd with balloon dilitation    UPPER GASTROINTESTINAL ENDOSCOPY  08/16/2018    EGD DILATION BALLOON performed by Emerald Rivas MD at [address] [facility] 10/01/2018    EGD BIOPSY performed by Norma Vera MD at [place] [address]  10/01/2018    EGD DILATION BALLOON performed by Norma Vera MD at [place] [address] N/A 11/19/2018    EGD DILATION BALLOON performed by Norma Vera MD at [facility] N/A 10/15/2020    EGD SUBMUCOSAL/BOTOX INJECTION tattoo  performed by Lis Sandhu MD at [place] [address] N/A 7/16/2021    EGD BIOPSY performed by Mando Lux MD at [address] [facility] History   Problem Relation Age of Onset    Cancer Mother     Heart Attack Father      Social History     Tobacco Use    Smoking status: Former     Packs/day: 1.00     Years: 20.00     Pack years: 20.00 Types: Cigarettes     Quit date: 1976     Years since quittin.0    Smokeless tobacco: Never   Substance Use Topics    Alcohol use: No      Current Outpatient Medications   Medication Sig Dispense Refill    cephALEXin (KEFLEX) 500 MG capsule Take 1 capsule by mouth 4 times daily for 7 days 28 capsule 0    tiZANidine (ZANAFLEX) 4 MG tablet Take 1 tablet by mouth every 8 hours as needed (spasms) 30 tablet 0    cetirizine (ZYRTEC) 10 MG tablet TAKE 1 TABLET EVERY DAY 30 tablet 2    RESTASIS 0.05 % ophthalmic emulsion       fluticasone-salmeterol (ADVAIR) 500-50 MCG/ACT AEPB diskus inhaler Inhale into the lungs      modafinil (PROVIGIL) 200 MG tablet       ondansetron (ZOFRAN) 4 MG tablet       polyvinyl alcohol (LIQUIFILM TEARS) 1.4 % ophthalmic solution 2 drops      sodium chloride 0.9 % infusion       metoprolol tartrate (LOPRESSOR) 25 MG tablet Take 0.5 tablets by mouth 2 times daily 60 tablet 2    rOPINIRole (REQUIP) 0.25 MG tablet TAKE 1 TABLET BY MOUTH TWICE DAILY 56 tablet 3    lipase-protease-amylase (CREON) 77890-14730 units delayed release capsule TAKE 1 CAPSULE BY MOUTH THREE TIMES DAILY WITH MEALS 84 capsule 3    guaiFENesin (MUCUS RELIEF) 600 MG extended release tablet TAKE 1 TABLET BY MOUTH TWICE DAILY 60 tablet 3    fluticasone-salmeterol (ADVAIR) 250-50 MCG/ACT AEPB diskus inhaler 1 puff      senna-docusate (PERICOLACE) 8.6-50 MG per tablet Take 1 tablet by mouth daily as needed      furosemide (LASIX) 40 MG tablet Take 1 tablet by mouth daily 60 tablet 2    pantoprazole (PROTONIX) 40 MG tablet Take 1 tablet by mouth daily 28 tablet 3    midodrine (PROAMATINE) 5 MG tablet Take 1 tablet by mouth every 8 hours 90 tablet 0    docusate sodium (COLACE) 100 MG capsule TAKE 1 CAPSULE BY MOUTH TWICE DAILY AS NEEDED FOR CONSTIPATION 60 capsule 0    vitamin B-12 (CYANOCOBALAMIN) 100 MCG tablet TAKE 1 TABLET BY MOUTH DAILY AS NEEDED FOR FATIGUE 30 tablet 5    rivaroxaban (XARELTO) 20 MG TABS tablet TAKE 1 TABLET BY MOUTH DAILY 28 tablet 5    Multiple Vitamin (MULTI-VITAMINS) TABS TAKE 1 TABLET BY MOUTH DAILY 28 tablet 5    tamsulosin (FLOMAX) 0.4 MG capsule Take 1 capsule by mouth daily 30 capsule 5    Fluticasone furoate-vilanterol (BREO ELLIPTA) 200-25 MCG/INH AEPB inhaler INHALE 1 PUFF INTO THE LUNGS DAILY **RINSE MOUTH AFTER EACH USE**      Nutritional Supplements (BOOST) LIQD 1 can twice a day for meals 60 Can 2    albuterol sulfate HFA (PROVENTIL;VENTOLIN;PROAIR) 108 (90 Base) MCG/ACT inhaler Inhale 2 puffs into the lungs every 6 hours as needed for Wheezing or Shortness of Breath      carboxymethylcellulose (REFRESH PLUS) 0.5 % SOLN ophthalmic solution Apply 1 drop to eye 4 times daily      ascorbic acid (VITAMIN C) 500 MG tablet Take 1 tablet by mouth daily 30 tablet 2    ferrous sulfate (FE TABS 325) 325 (65 Fe) MG EC tablet Take 1 tablet by mouth 2 times daily (with meals) 90 tablet 3    SM LUBRICANT EYE DROPS 0.4-0.3 % ophthalmic solution PLACE 1 DROP INTO BOTH EYES FOUR TIMES DAILY AS NEEDED FOR DRY EYES 15 mL 3    Handicap Placard MISC by Does not apply route 1 each 0    Incontinence Supply Disposable (INCONTINENCE BRIEF LARGE) MISC To use as directed. Use 3x a day 90 each 3    Foot Care Products (TRI-BALANCE ORTHOTICS MENS) MISC Provide insurance covered orthotics shoes, wear daily 2 each 0    ipratropium-albuterol (DUONEB) 0.5-2.5 (3) MG/3ML SOLN nebulizer solution Inhale 1 vial into the lungs every 4 hours as needed       Armodafinil 200 MG TABS Take 1 tablet by mouth 2 times daily. gabapentin (NEURONTIN) 300 MG capsule Take 1 capsule by mouth 3 times daily for 28 days. 84 capsule 3     No current facility-administered medications for this visit.      Allergies   Allergen Reactions    Darvocet [Propoxyphene N-Acetaminophen] Hives    Other Hives    Oxycodone-Acetaminophen     Propoxyphene      Other reaction(s): Unknown       Health Maintenance   Topic Date Due    COVID-19 Vaccine (1) Never done Shingles vaccine (1 of 2) Never done    Flu vaccine (1) 08/01/2022    Annual Wellness Visit (AWV)  11/07/2022    Depression Screen  11/07/2023    DTaP/Tdap/Td vaccine (2 - Td or Tdap) 04/22/2029    Pneumococcal 65+ years Vaccine  Completed    Hepatitis C screen  Completed    Hepatitis A vaccine  Aged Out    Hib vaccine  Aged Out    Meningococcal (ACWY) vaccine  Aged Out       Review of Systems   Constitutional:  Negative for activity change, appetite change and fever. HENT:  Positive for nosebleeds and postnasal drip. Negative for sinus pressure, sinus pain and sore throat. Eyes:  Negative for photophobia and visual disturbance. Respiratory:  Positive for shortness of breath. Negative for cough and chest tightness. Cardiovascular:  Positive for leg swelling. Negative for chest pain. Gastrointestinal:  Negative for abdominal pain, diarrhea, nausea and vomiting. Endocrine: Negative for polydipsia and polyuria. Genitourinary:  Negative for difficulty urinating. Musculoskeletal:  Negative for back pain and neck pain. Skin:  Positive for color change and wound. Neurological:  Negative for dizziness, light-headedness and headaches. Psychiatric/Behavioral:  Negative for behavioral problems. Objective:     Physical Exam  Vitals and nursing note reviewed. Constitutional:       General: He is not in acute distress. Appearance: Normal appearance. He is not toxic-appearing. Comments: Wheelchair-bound   HENT:      Head: Normocephalic. Right Ear: External ear normal.      Left Ear: External ear normal.      Nose: Nose normal. No congestion or rhinorrhea. Mouth/Throat:      Mouth: Mucous membranes are moist.      Pharynx: Oropharynx is clear. No oropharyngeal exudate. Eyes:      Conjunctiva/sclera: Conjunctivae normal.      Pupils: Pupils are equal, round, and reactive to light. Cardiovascular:      Rate and Rhythm: Normal rate and regular rhythm.       Pulses: Normal x3 days with increasing bilateral lower extremity pitting edema and shortness of breath. Patient will then follow-up with cardiology appointment next week  No follow-ups on file. Plan of care reviewed with patient. Questions and concerns addressed to patient satisfaction. Follow up as directed.      Electronically signed by JULIAN Hill CNP, PACon 12/7/2022 at 8:59 AM

## 2022-12-07 RX ORDER — FLUTICASONE PROPIONATE 50 MCG
SPRAY, SUSPENSION (ML) NASAL
Qty: 16 G | Refills: 3 | Status: SHIPPED | OUTPATIENT
Start: 2022-12-07

## 2022-12-07 ASSESSMENT — ENCOUNTER SYMPTOMS
COUGH: 0
NAUSEA: 0
ABDOMINAL PAIN: 0
VOMITING: 0
SINUS PAIN: 0
CHEST TIGHTNESS: 0
SINUS PRESSURE: 0
PHOTOPHOBIA: 0
DIARRHEA: 0
SORE THROAT: 0
SHORTNESS OF BREATH: 1
BACK PAIN: 0
COLOR CHANGE: 1

## 2022-12-08 DIAGNOSIS — Z76.0 MEDICATION REFILL: ICD-10-CM

## 2022-12-08 NOTE — TELEPHONE ENCOUNTER
Last visit: 12/6/22  Last Med refill: 10/6/22  Does patient have enough medication for 72 hours: No:     Next Visit Date:  Future Appointments   Date Time Provider Jeri Flynn   2/7/2023 12:45 PM JULIAN Kaur  [facility] Maintenance   Topic Date Due    COVID-19 Vaccine (1) Never done    Shingles vaccine (1 of 2) Never done    Flu vaccine (1) 08/01/2022    Annual Wellness Visit (AWV)  11/07/2022    Depression Screen  11/07/2023    DTaP/Tdap/Td vaccine (2 - Td or Tdap) 04/22/2029    Pneumococcal 65+ years Vaccine  Completed    Hepatitis C screen  Completed    Hepatitis A vaccine  Aged Out    Hib vaccine  Aged Out    Meningococcal (ACWY) vaccine  Aged Out       No results found for: LABA1C          ( goal A1C is < 7)   No results found for: LABMICR  LDL Cholesterol (mg/dL)   Date Value   04/01/2021 49   06/24/2019 65     LDL Calculated (mg/dL)   Date Value   10/30/2013 67       (goal LDL is <100)   AST (U/L)   Date Value   01/07/2022 30     ALT (U/L)   Date Value   01/07/2022 12     BUN (mg/dL)   Date Value   02/12/2022 9     BP Readings from Last 3 Encounters:   12/06/22 124/76   11/07/22 112/67   02/12/22 100/65          (goal 120/80)    All Future Testing planned in [facility]  Lab Frequency Next Occurrence   XR FOOT RIGHT (MIN 3 VIEWS) Once 12/06/2022               Patient Active Problem List:     Chronic atrial fibrillation (HCC)     Dyslipidemia     Gastroesophageal reflux disease without esophagitis     Osteoarthritis of lumbar spine     OA (osteoarthritis) of knee, bilateral     Foot drop, right     Hallux valgus, acquired, bilateral     Hearing impairment     Essential hypertension     Slow transit constipation     MARIAMA on CPAP     COPD (chronic obstructive pulmonary disease) (HCC)     Chronic diastolic heart failure (HCC)     History of bariatric surgery including banding leading to esophageal stricture and aspiration     BPH (benign prostatic hyperplasia)     Myalgia Atrial fibrillation with slow ventricular response (HCC)     Pacemaker pocket hematoma, initial encounter     Pulmonary hypertension ([address]     Presence of permanent cardiac pacemaker     Fall at home, initial encounter     Left displaced femoral neck fracture ([address]     Retained orthopedic hardware     COVID-19     Elevated C-reactive protein (CRP)

## 2022-12-09 ENCOUNTER — TELEPHONE (OUTPATIENT)
Dept: PRIMARY CARE CLINIC | Age: 77
End: 2022-12-09

## 2022-12-09 RX ORDER — POLYPROPYLENE GLYCOL 400, PROPYLENE GLYCOL .4; .3 G/100ML; G/100ML
LIQUID OPHTHALMIC
Qty: 15 ML | Refills: 3 | Status: SHIPPED | OUTPATIENT
Start: 2022-12-09 | End: 2023-12-08

## 2022-12-09 NOTE — TELEPHONE ENCOUNTER
Pt called stating he is going to need a new referral for ENT as previous one does not take his insurance

## 2022-12-12 NOTE — TELEPHONE ENCOUNTER
Pts wife called back and was informed of below. Pts wife will call back with information after she calls the insurance company.

## 2022-12-22 DIAGNOSIS — R39.11 BENIGN PROSTATIC HYPERPLASIA WITH URINARY HESITANCY: ICD-10-CM

## 2022-12-22 DIAGNOSIS — Z76.0 MEDICATION REFILL: ICD-10-CM

## 2022-12-22 DIAGNOSIS — N40.1 BENIGN PROSTATIC HYPERPLASIA WITH URINARY HESITANCY: ICD-10-CM

## 2022-12-22 RX ORDER — TAMSULOSIN HYDROCHLORIDE 0.4 MG/1
0.4 CAPSULE ORAL DAILY
Qty: 30 CAPSULE | Refills: 5 | Status: SHIPPED | OUTPATIENT
Start: 2022-12-22

## 2022-12-22 NOTE — TELEPHONE ENCOUNTER
Yinka Valdez is calling to request a refill on the following medication(s):    Last Visit Date (If Applicable):  85/9/4133    Next Visit Date:    2/7/2023    Medication Request:  Requested Prescriptions     Pending Prescriptions Disp Refills    tamsulosin (FLOMAX) 0.4 MG capsule 30 capsule 5     Sig: Take 1 capsule by mouth daily

## 2022-12-29 ENCOUNTER — TELEPHONE (OUTPATIENT)
Dept: PRIMARY CARE CLINIC | Age: 77
End: 2022-12-29

## 2022-12-29 NOTE — TELEPHONE ENCOUNTER
Mescalerocharmaine Marie- [phone] from [facility] is calling and states the patient is not doing well and needs assistance with ADL's. She wants to know if she can have an order for PT & OT evals so the patient can gain some strength back.

## 2022-12-30 RX ORDER — TIZANIDINE 4 MG/1
TABLET ORAL
Qty: 30 TABLET | Refills: 0 | Status: SHIPPED | OUTPATIENT
Start: 2022-12-30

## 2022-12-30 NOTE — TELEPHONE ENCOUNTER
Kaylyn Sumner is calling to request a refill on the following medication(s):    Last Visit Date (If Applicable):  78/5/7576    Next Visit Date:    2/7/2023    Medication Request:  Requested Prescriptions     Pending Prescriptions Disp Refills    tiZANidine (ZANAFLEX) 4 MG tablet [Pharmacy Med Name: tiZANidine HCl 4 MG Oral Tablet (Zanaflex)] 30 tablet 0     Sig: TAKE 1 TABLET BY MOUTH EVERY 8 HOURS AS NEEDED FOR MUSCLE SPASMS

## 2023-01-11 ENCOUNTER — TELEPHONE (OUTPATIENT)
Dept: PRIMARY CARE CLINIC | Age: 78
End: 2023-01-11

## 2023-01-11 NOTE — TELEPHONE ENCOUNTER
Patient called regarding this.  I scheduled an appointment with melisa on Friday per patients request.

## 2023-01-11 NOTE — TELEPHONE ENCOUNTER
----- Message from Aaliyah Schumacher sent at 1/11/2023 10:51 AM EST -----  Subject: Message to Provider    QUESTIONS  Information for Provider? The sides of the patients body has a hard,   lumpy, leather like feeling. Tavon Sethi is wanting to know if that is normal.   Please advise  ---------------------------------------------------------------------------  --------------  Susana Pradhan INFO  [phone]; OK to leave message on voicemail  ---------------------------------------------------------------------------  --------------  SCRIPT ANSWERS  Relationship to Patient? Other  Representative Name? Willa Guajardo  Is the Representative on the appropriate HIPAA document in Epic?  Yes

## 2023-01-27 ENCOUNTER — OFFICE VISIT (OUTPATIENT)
Dept: PRIMARY CARE CLINIC | Age: 78
End: 2023-01-27
Payer: COMMERCIAL

## 2023-01-27 VITALS
OXYGEN SATURATION: 99 % | SYSTOLIC BLOOD PRESSURE: 118 MMHG | HEART RATE: 63 BPM | BODY MASS INDEX: 24.19 KG/M2 | DIASTOLIC BLOOD PRESSURE: 73 MMHG | WEIGHT: 168.6 LBS

## 2023-01-27 DIAGNOSIS — L89.152 DECUBITUS ULCER OF COCCYGEAL REGION, STAGE 2 (HCC): ICD-10-CM

## 2023-01-27 DIAGNOSIS — K86.89 PANCREATIC INSUFFICIENCY: ICD-10-CM

## 2023-01-27 DIAGNOSIS — G25.81 RLS (RESTLESS LEGS SYNDROME): ICD-10-CM

## 2023-01-27 DIAGNOSIS — G62.9 NEUROPATHY: ICD-10-CM

## 2023-01-27 DIAGNOSIS — J18.9 PNEUMONIA OF BOTH LUNGS DUE TO INFECTIOUS ORGANISM, UNSPECIFIED PART OF LUNG: Primary | ICD-10-CM

## 2023-01-27 DIAGNOSIS — Z09 HOSPITAL DISCHARGE FOLLOW-UP: ICD-10-CM

## 2023-01-27 DIAGNOSIS — Z76.0 MEDICATION REFILL: ICD-10-CM

## 2023-01-27 PROCEDURE — 99214 OFFICE O/P EST MOD 30 MIN: CPT | Performed by: NURSE PRACTITIONER

## 2023-01-27 PROCEDURE — 1111F DSCHRG MED/CURRENT MED MERGE: CPT | Performed by: NURSE PRACTITIONER

## 2023-01-27 PROCEDURE — G8427 DOCREV CUR MEDS BY ELIG CLIN: HCPCS | Performed by: NURSE PRACTITIONER

## 2023-01-27 PROCEDURE — 3074F SYST BP LT 130 MM HG: CPT | Performed by: NURSE PRACTITIONER

## 2023-01-27 PROCEDURE — 1123F ACP DISCUSS/DSCN MKR DOCD: CPT | Performed by: NURSE PRACTITIONER

## 2023-01-27 PROCEDURE — G8484 FLU IMMUNIZE NO ADMIN: HCPCS | Performed by: NURSE PRACTITIONER

## 2023-01-27 PROCEDURE — G8420 CALC BMI NORM PARAMETERS: HCPCS | Performed by: NURSE PRACTITIONER

## 2023-01-27 PROCEDURE — 3078F DIAST BP <80 MM HG: CPT | Performed by: NURSE PRACTITIONER

## 2023-01-27 PROCEDURE — 1036F TOBACCO NON-USER: CPT | Performed by: NURSE PRACTITIONER

## 2023-01-27 RX ORDER — ROSUVASTATIN CALCIUM 10 MG/1
TABLET, COATED ORAL
COMMUNITY
Start: 2023-01-20

## 2023-01-27 RX ORDER — GABAPENTIN 300 MG/1
300 CAPSULE ORAL 3 TIMES DAILY
Qty: 84 CAPSULE | Refills: 3 | Status: SHIPPED | OUTPATIENT
Start: 2023-01-27 | End: 2023-02-24

## 2023-01-27 RX ORDER — BUMETANIDE 2 MG/1
TABLET ORAL
COMMUNITY
Start: 2023-01-24

## 2023-01-27 RX ORDER — MAGNESIUM OXIDE 400 MG/1
400 TABLET ORAL 2 TIMES DAILY
COMMUNITY
Start: 2023-01-20 | End: 2023-02-19

## 2023-01-27 RX ORDER — GUAIFENESIN 600 MG/1
TABLET, EXTENDED RELEASE ORAL
Qty: 60 TABLET | Refills: 3 | Status: SHIPPED | OUTPATIENT
Start: 2023-01-27

## 2023-01-27 RX ORDER — SPIRONOLACTONE 25 MG/1
TABLET ORAL
COMMUNITY
Start: 2023-01-20

## 2023-01-27 RX ORDER — PANCRELIPASE 24000; 76000; 120000 [USP'U]/1; [USP'U]/1; [USP'U]/1
CAPSULE, DELAYED RELEASE PELLETS ORAL
Qty: 84 CAPSULE | Refills: 3 | Status: SHIPPED | OUTPATIENT
Start: 2023-01-27

## 2023-01-27 RX ORDER — TIZANIDINE 4 MG/1
TABLET ORAL
Qty: 30 TABLET | Refills: 1 | Status: SHIPPED | OUTPATIENT
Start: 2023-01-27

## 2023-01-27 RX ORDER — ROPINIROLE 0.25 MG/1
TABLET, FILM COATED ORAL
Qty: 56 TABLET | Refills: 3 | Status: SHIPPED | OUTPATIENT
Start: 2023-01-27

## 2023-01-27 ASSESSMENT — PATIENT HEALTH QUESTIONNAIRE - PHQ9
SUM OF ALL RESPONSES TO PHQ QUESTIONS 1-9: 0
SUM OF ALL RESPONSES TO PHQ9 QUESTIONS 1 & 2: 0
2. FEELING DOWN, DEPRESSED OR HOPELESS: 0
SUM OF ALL RESPONSES TO PHQ QUESTIONS 1-9: 0
1. LITTLE INTEREST OR PLEASURE IN DOING THINGS: 0

## 2023-01-27 NOTE — PROGRESS NOTES
Post-Discharge Transitional Care Management Progress Note      Fermin Bradley   YOB: 1945    Date of Office Visit:  1/27/2023  Date of Hospital Admission: 1/13/2023  Date of Hospital Discharge: 1/21/2023    Care management risk score Rising risk (score 2-5) and Complex Care (Scores >=6): No Risk Score On File     Non face to face  following discharge, date last encounter closed (first attempt may have been earlier): *No documented post hospital discharge outreach found in the last 14 days *No documented post hospital discharge outreach found in the last 14 days    Call initiated 2 business days of discharge: *No response recorded in the last 14 days    ASSESSMENT/PLAN:   Pneumonia of both lungs due to infectious organism, unspecified part of lung  Medication refill  -     rOPINIRole (REQUIP) 0.25 MG tablet; TAKE 1 TABLET BY MOUTH TWICE DAILY, Disp-56 tablet, R-3Normal  -     gabapentin (NEURONTIN) 300 MG capsule; Take 1 capsule by mouth 3 times daily for 28 days. , Disp-84 capsule, R-3Normal  -     lipase-protease-amylase (CREON) 28886-61310 units delayed release capsule; TAKE 1 CAPSULE BY MOUTH THREE TIMES DAILY WITH MEALS, Disp-84 capsule, R-3Normal  -     guaiFENesin (MUCUS RELIEF) 600 MG extended release tablet; TAKE 1 TABLET BY MOUTH TWICE DAILY, Disp-60 tablet, R-3Normal  RLS (restless legs syndrome)  -     rOPINIRole (REQUIP) 0.25 MG tablet; TAKE 1 TABLET BY MOUTH TWICE DAILY, Disp-56 tablet, R-3Normal  -     tiZANidine (ZANAFLEX) 4 MG tablet; TAKE 1 TABLET BY MOUTH EVERY 8 HOURS AS NEEDED FOR MUSCLE SPASMS, Disp-30 tablet, R-1Normal  Neuropathy  -     gabapentin (NEURONTIN) 300 MG capsule; Take 1 capsule by mouth 3 times daily for 28 days. , Disp-84 capsule, R-3Normal  Pancreatic insufficiency  -     lipase-protease-amylase (CREON) 84442-79415 units delayed release capsule; TAKE 1 CAPSULE BY MOUTH THREE TIMES DAILY WITH MEALS, Disp-84 capsule, R-3Normal  Hospital discharge follow-up  - ND DISCHARGE MEDS RECONCILED W/ CURRENT OUTPATIENT MED LIST  Decubitus ulcer of coccygeal region, stage 2 ([address]    María Gautam states symptoms have improved since completing antibiotics, does see his pulmonologist within the week for follow-up. Asking for refills today, also asking for refill of tizanidine. Evaluated sacral wound, appears consistent with stage II ulcer. Is set up with outpatient wound management on Tuesday. Reassured patient and wife there is no evidence of infection at this time, change dressings every 2 to 3 days and keep in contact with [facility] for supplies. Edema improved with switch to Bumex    Medical Decision Making: high complexity  Return in about 6 weeks (around 3/10/2023) for copd. Subjective:   HPI:  Follow up of Hospital problems/diagnosis(es): Patient admitted to [facility] for pneumonia of both lungs due to infectious organism, sepsis secondary to pneumonia and gallstones without cholecystitis. Has appt with Cardiology on the 1st for cardiac amyloidosis. Following with home care, to start PT/OT/ST at home. Denies being advised to thicken liquids. Told not to use a straw    He has a wound on his cocyx, nurse was to change dressing 3 times a week. Advised to use Silver alginate. Did not have any to leave them and has not been back to the house. [facility] is following  See's the wound clinic this Tuesday      Inpatient course: Discharge summary reviewed- see chart. HPI: As per admitting physician  Lj Oneill is a 69 y.o. male medical history significant for atrial fibrillation on Xarelto, hypertension, hyperlipidemia, COPD, sleep apnea, who presented to the emergency department for evaluation of generalized weakness, cough and shortness of breath. Patient is ill-appearing at present but states that roughly 1 week ago he began to feel ill with a wet cough, he does have a history of COPD but states that his cough is new.  For the past week he has been eating and drinking very little and becoming weaker. He states he has been experiencing fevers and chills. He began to experience nonbilious vomiting yesterday evening. Workup in the emergency department revealed bibasilar pneumonia and sepsis with a white blood cell count of 17 a lactate of 3.7 and procalcitonin of 3.88. Patient also describing significant peripheral edema to the abdomen. He denies any chest pain, patient's troponin level found to be 0.07. Blood pressure prior to arrival was low and he received 1 L of IV fluids. HOSPITAL COURSE SUMMARY:    He was started on IV diuresis on admission. Nephrology evaluation was obtained. He received high-dose IV diuretics. He was negative by 17 L at the time of discharge. His lower extremity edema improved considerably while in the hospital. He was also treated for bilateral pneumonia initially with Zosyn later changed to Augmentin. His abdominal distension improved markedly. Physical therapy evaluation was obtained. He was at his baseline in terms of his physical activity. At home he is mainly wheelchair bound and P words from bed to chair. I had discussed placement option with wife and patient. They opted for going home as they have everything set up at home. Swallow study was performed. He is advised Level 6 soft and bite sized diet with thin liquids (NO STRAWS!). Swallow study showed supraglottic penetration of thin consistency by straw. Cardiology evaluation was obtained while in the hospital. Echo was performed which showed some suspicious of a myeloid, LVH increased wall thicknesses process. Technetium pyrophosphate study was performed-result pending. He will follow-up with cardiology in outpatient. As per cardiologist documentation studies clearly positive. Assessment and plan on discharge     # Acute on chronic heart failure with preserved ejection fraction  --status post IV diuresis  -p.o.  Bumex 2 mg b.i.d.  BMP in 5-7 days  Follow-up with PCP and Cardiology     -[facility](4/51)-[facility] LV systolic function with ejection fraction 55-60%, mild MR, small pericardial effusion with no evidence of tamponade. Dilated right ventricle with mild to moderately reduced systolic function     # metabolic alkalosis compensated with respiratory acidosis     # Bilateral community-acquired pneumonia   Completed total 7 days of antibiotic      # nodular density seen on chest X ray  -CT chest revealed multifocal peribronchial pneumonia, bilateral pleural effusion, moderate pericardial effusion and mild mediastinal lymphadenopathy        # oropharyngeal dysphagia  Swallow study obtained on 01/15- Supraglottic penetration of thin consistency by straw      Patient Active Problem List   Diagnosis    Chronic atrial fibrillation (HCC)    Dyslipidemia    Gastroesophageal reflux disease without esophagitis    Osteoarthritis of lumbar spine    OA (osteoarthritis) of knee, bilateral    Foot drop, right    Hallux valgus, acquired, bilateral    Hearing impairment    Essential hypertension    Slow transit constipation    MARIAMA on CPAP    COPD (chronic obstructive pulmonary disease) (HCC)    Chronic diastolic heart failure (HCC)    History of bariatric surgery including banding leading to esophageal stricture and aspiration    BPH (benign prostatic hyperplasia)    Myalgia    Atrial fibrillation with slow ventricular response ([address]    Pacemaker pocket hematoma, initial encounter    Pulmonary hypertension ([address]    Presence of permanent cardiac pacemaker    Fall at home, initial encounter    Left displaced femoral neck fracture ([address]    Retained orthopedic hardware    COVID-19    Elevated C-reactive protein (CRP)       Medications listed as ordered at the time of discharge from hospital     Medication List            Accurate as of January 27, 2023  1:59 PM. If you have any questions, ask your nurse or doctor.                 CONTINUE taking these medications      albuterol sulfate  (90 Base) MCG/ACT inhaler  Commonly known as: PROVENTIL;VENTOLIN;PROAIR     Armodafinil 200 MG Tabs     ascorbic acid 500 MG tablet  Commonly known as: VITAMIN C  Take 1 tablet by mouth daily     Boost Liqd  1 can twice a day for meals     Breo Ellipta 200-25 MCG/ACT Aepb inhaler  Generic drug: fluticasone furoate-vilanterol     bumetanide 2 MG tablet  Commonly known as: BUMEX     carboxymethylcellulose 0.5 % Soln ophthalmic solution  Commonly known as: REFRESH PLUS     cetirizine 10 MG tablet  Commonly known as: ZYRTEC  TAKE 1 TABLET EVERY DAY     Creon 92024-08452 units delayed release capsule  Generic drug: lipase-protease-amylase  TAKE 1 CAPSULE BY MOUTH THREE TIMES DAILY WITH MEALS     docusate sodium 100 MG capsule  Commonly known as: COLACE  TAKE 1 CAPSULE BY MOUTH TWICE DAILY AS NEEDED FOR CONSTIPATION     ferrous sulfate 325 (65 Fe) MG EC tablet  Commonly known as: FE TABS 325  Take 1 tablet by mouth 2 times daily (with meals)     fluticasone 50 MCG/ACT nasal spray  Commonly known as: FLONASE  INSTILL 2 SPRAYS INTO EACH NOSTRIL DAILY     * fluticasone-salmeterol 250-50 MCG/ACT Aepb diskus inhaler  Commonly known as: ADVAIR     * fluticasone-salmeterol 500-50 MCG/ACT Aepb diskus inhaler  Commonly known as: ADVAIR     furosemide 40 MG tablet  Commonly known as: LASIX  Take 1 tablet by mouth daily     gabapentin 300 MG capsule  Commonly known as: NEURONTIN  Take 1 capsule by mouth 3 times daily for 28 days. guaiFENesin 600 MG extended release tablet  Commonly known as: Mucus Relief  TAKE 1 TABLET BY MOUTH TWICE DAILY     Handicap Placard Misc  by Does not apply route     Incontinence Brief Large Misc  To use as directed.  Use 3x a day     ipratropium-albuterol 0.5-2.5 (3) MG/3ML Soln nebulizer solution  Commonly known as: DUONEB     magnesium oxide 400 MG tablet  Commonly known as: MAG-OX     metoprolol tartrate 25 MG tablet  Commonly known as: LOPRESSOR  Take 0.5 tablets by mouth 2 times daily     midodrine 5 MG tablet  Commonly known as: PROAMATINE  Take 1 tablet by mouth every 8 hours     modafinil 200 MG tablet  Commonly known as: PROVIGIL     Multi-Vitamins Tabs  TAKE 1 TABLET BY MOUTH DAILY     ondansetron 4 MG tablet  Commonly known as: ZOFRAN     pantoprazole 40 MG tablet  Commonly known as: PROTONIX  Take 1 tablet by mouth daily     polyvinyl alcohol 1.4 % ophthalmic solution  Commonly known as: LIQUIFILM TEARS     Restasis 0.05 % ophthalmic emulsion  Generic drug: cycloSPORINE     rivaroxaban 20 MG Tabs tablet  Commonly known as: Xarelto  TAKE 1 TABLET BY MOUTH DAILY     rOPINIRole 0.25 MG tablet  Commonly known as: REQUIP  TAKE 1 TABLET BY MOUTH TWICE DAILY     rosuvastatin 10 MG tablet  Commonly known as: CRESTOR     senna-docusate 8.6-50 MG per tablet  Commonly known as: PERICOLACE     SM Lubricant Eye Drops 0.4-0.3 % ophthalmic solution  Generic drug: polyethyl glycol-propyl glycol 0.4-0.3 %  PLACE 1 DROP INTO BOTH EYES FOUR TIMES DAILY AS NEEDED FOR DRY EYES     sodium chloride 0.9 % infusion     spironolactone 25 MG tablet  Commonly known as: ALDACTONE     tamsulosin 0.4 MG capsule  Commonly known as: FLOMAX  Take 1 capsule by mouth daily     tiZANidine 4 MG tablet  Commonly known as: ZANAFLEX  TAKE 1 TABLET BY MOUTH EVERY 8 HOURS AS NEEDED FOR MUSCLE SPASMS     Tri-[employer]ance Orthotics Mens Misc  Provide insurance covered orthotics shoes, wear daily     vitamin B-12 100 MCG tablet  Commonly known as: CYANOCOBALAMIN  TAKE 1 TABLET BY MOUTH DAILY AS NEEDED FOR FATIGUE           * This list has 2 medication(s) that are the same as other medications prescribed for you. Read the directions carefully, and ask your doctor or other care provider to review them with you.                    Where to Get Your Medications        These medications were sent to [facility], [address] [place] [address] 21  [address] [facility] [address]      Phone: [phone]   Meghan [address]66-57144 units delayed release capsule  gabapentin 300 MG capsule  guaiFENesin 600 MG extended release tablet  rOPINIRole 0.25 MG tablet  tiZANidine 4 MG tablet           Medications marked \"taking\" at this time  Outpatient Medications Marked as Taking for the 1/27/23 encounter (Office Visit) with JULIAN Lindsay CNP   Medication Sig Dispense Refill    magnesium oxide (MAG-OX) 400 MG tablet Take 400 mg by mouth 2 times daily      rOPINIRole (REQUIP) 0.25 MG tablet TAKE 1 TABLET BY MOUTH TWICE DAILY 56 tablet 3    gabapentin (NEURONTIN) 300 MG capsule Take 1 capsule by mouth 3 times daily for 28 days.  84 capsule 3    lipase-protease-amylase (CREON) 33658-75345 units delayed release capsule TAKE 1 CAPSULE BY MOUTH THREE TIMES DAILY WITH MEALS 84 capsule 3    guaiFENesin (MUCUS RELIEF) 600 MG extended release tablet TAKE 1 TABLET BY MOUTH TWICE DAILY 60 tablet 3    tiZANidine (ZANAFLEX) 4 MG tablet TAKE 1 TABLET BY MOUTH EVERY 8 HOURS AS NEEDED FOR MUSCLE SPASMS 30 tablet 1    tamsulosin (FLOMAX) 0.4 MG capsule Take 1 capsule by mouth daily 30 capsule 5    polyethyl glycol-propyl glycol 0.4-0.3 % (SM LUBRICANT EYE DROPS) 0.4-0.3 % ophthalmic solution PLACE 1 DROP INTO BOTH EYES FOUR TIMES DAILY AS NEEDED FOR DRY EYES 15 mL 3    fluticasone (FLONASE) 50 MCG/ACT nasal spray INSTILL 2 SPRAYS INTO EACH NOSTRIL DAILY 16 g 3    cetirizine (ZYRTEC) 10 MG tablet TAKE 1 TABLET EVERY DAY 30 tablet 2    RESTASIS 0.05 % ophthalmic emulsion       fluticasone-salmeterol (ADVAIR) 500-50 MCG/ACT AEPB diskus inhaler Inhale into the lungs      modafinil (PROVIGIL) 200 MG tablet       ondansetron (ZOFRAN) 4 MG tablet       polyvinyl alcohol (LIQUIFILM TEARS) 1.4 % ophthalmic solution 2 drops      sodium chloride 0.9 % infusion       metoprolol tartrate (LOPRESSOR) 25 MG tablet Take 0.5 tablets by mouth 2 times daily 60 tablet 2    fluticasone-salmeterol (ADVAIR) 250-50 MCG/ACT AEPB diskus inhaler 1 puff      senna-docusate (PERICOLACE) 8.6-50 MG per tablet Take 1 tablet by mouth daily as needed      furosemide (LASIX) 40 MG tablet Take 1 tablet by mouth daily 60 tablet 2    pantoprazole (PROTONIX) 40 MG tablet Take 1 tablet by mouth daily 28 tablet 3    midodrine (PROAMATINE) 5 MG tablet Take 1 tablet by mouth every 8 hours 90 tablet 0    docusate sodium (COLACE) 100 MG capsule TAKE 1 CAPSULE BY MOUTH TWICE DAILY AS NEEDED FOR CONSTIPATION 60 capsule 0    vitamin B-12 (CYANOCOBALAMIN) 100 MCG tablet TAKE 1 TABLET BY MOUTH DAILY AS NEEDED FOR FATIGUE 30 tablet 5    rivaroxaban (XARELTO) 20 MG TABS tablet TAKE 1 TABLET BY MOUTH DAILY 28 tablet 5    Multiple Vitamin (MULTI-VITAMINS) TABS TAKE 1 TABLET BY MOUTH DAILY 28 tablet 5    Fluticasone furoate-vilanterol (BREO ELLIPTA) 200-25 MCG/INH AEPB inhaler INHALE 1 PUFF INTO THE LUNGS DAILY **RINSE MOUTH AFTER EACH USE**      Nutritional Supplements (BOOST) LIQD 1 can twice a day for meals 60 Can 2    albuterol sulfate HFA (PROVENTIL;VENTOLIN;PROAIR) 108 (90 Base) MCG/ACT inhaler Inhale 2 puffs into the lungs every 6 hours as needed for Wheezing or Shortness of Breath      carboxymethylcellulose (REFRESH PLUS) 0.5 % SOLN ophthalmic solution Apply 1 drop to eye 4 times daily      ascorbic acid (VITAMIN C) 500 MG tablet Take 1 tablet by mouth daily 30 tablet 2    ferrous sulfate (FE TABS 325) 325 (65 Fe) MG EC tablet Take 1 tablet by mouth 2 times daily (with meals) 90 tablet 3    Handicap Placard MISC by Does not apply route 1 each 0    Incontinence Supply Disposable (INCONTINENCE BRIEF LARGE) MISC To use as directed. Use 3x a day 90 each 3    Foot Care Products (TRI-BALANCE ORTHOTICS MENS) MISC Provide insurance covered orthotics shoes, wear daily 2 each 0    ipratropium-albuterol (DUONEB) 0.5-2.5 (3) MG/3ML SOLN nebulizer solution Inhale 1 vial into the lungs every 4 hours as needed       Armodafinil 200 MG TABS Take 1 tablet by mouth 2 times daily.            Medications patient taking as of now reconciled against medications ordered at time of hospital discharge: Yes    A comprehensive review of systems was negative except for what was noted in the HPI. Objective:    /73   Pulse 63   Wt 168 lb 9.6 oz (76.5 kg)   SpO2 99%   BMI 24.19 kg/m²   General Appearance: alert and oriented to person, place and time and frail-appearing  Skin: ecchymoses noted on BUE  Pulmonary/Chest: clear to auscultation bilaterally- no wheezes, rales or rhonchi, normal air movement, no respiratory distress    An electronic signature was used to authenticate this note.   --Creed Expose, APRN - CNP

## 2023-01-28 DIAGNOSIS — Z76.0 MEDICATION REFILL: ICD-10-CM

## 2023-01-28 DIAGNOSIS — K21.9 GASTROESOPHAGEAL REFLUX DISEASE WITHOUT ESOPHAGITIS: ICD-10-CM

## 2023-01-30 RX ORDER — MIDODRINE HYDROCHLORIDE 5 MG/1
5 TABLET ORAL EVERY 8 HOURS
Qty: 90 TABLET | Refills: 0 | Status: SHIPPED | OUTPATIENT
Start: 2023-01-30

## 2023-01-30 RX ORDER — ROSUVASTATIN CALCIUM 10 MG/1
10 TABLET, COATED ORAL NIGHTLY
Qty: 30 TABLET | Refills: 0 | Status: SHIPPED | OUTPATIENT
Start: 2023-01-30

## 2023-01-30 RX ORDER — SPIRONOLACTONE 25 MG/1
25 TABLET ORAL 2 TIMES DAILY
Qty: 30 TABLET | Refills: 0 | Status: SHIPPED | OUTPATIENT
Start: 2023-01-30

## 2023-01-30 RX ORDER — MAGNESIUM OXIDE 400 MG/1
400 TABLET ORAL 2 TIMES DAILY
Qty: 30 TABLET | Refills: 0 | Status: SHIPPED | OUTPATIENT
Start: 2023-01-30 | End: 2023-03-01

## 2023-01-30 RX ORDER — CETIRIZINE HYDROCHLORIDE 10 MG/1
TABLET ORAL
Qty: 28 TABLET | Refills: 2 | Status: SHIPPED | OUTPATIENT
Start: 2023-01-30

## 2023-01-30 RX ORDER — PANTOPRAZOLE SODIUM 40 MG/1
40 TABLET, DELAYED RELEASE ORAL DAILY
Qty: 28 TABLET | Refills: 3 | Status: SHIPPED | OUTPATIENT
Start: 2023-01-30

## 2023-01-30 NOTE — TELEPHONE ENCOUNTER
----- Message from [employer] sent at 1/30/2023 10:01 AM EST -----  Subject: Refill Request    QUESTIONS  Name of Medication? rosuvastatin (CRESTOR) 10 MG tablet  Patient-reported dosage and instructions? one tablet nightly  How many days do you have left? 0  Preferred Pharmacy? [facility] phone number (if available)? [phone]  ---------------------------------------------------------------------------  --------------,  Name of Medication? spironolactone (ALDACTONE) 25 MG tablet  Patient-reported dosage and instructions? one tab in am one at night with   meals  How many days do you have left? 0  Preferred Pharmacy? [facility] phone number (if available)? [phone]  ---------------------------------------------------------------------------  --------------,  Name of Medication? magnesium oxide (MAG-OX) 400 MG tablet  Patient-reported dosage and instructions? one in am and one before bed  How many days do you have left? 0  Preferred Pharmacy? [facility] phone number (if available)? [phone]  ---------------------------------------------------------------------------  --------------,  Name of Medication? midodrine (PROAMATINE) 5 MG tablet  Patient-reported dosage and instructions? one tab every 6 hours  How many days do you have left? 10  Preferred Pharmacy? [address] phone number (if available)? [phone]  ---------------------------------------------------------------------------  --------------  CALL BACK INFO  What is the best way for the office to contact you? OK to leave message on   voicemail  Preferred Call Back Phone Number? [phone]  ---------------------------------------------------------------------------  --------------  SCRIPT ANSWERS  Relationship to Patient? Other  Representative Name? Wife Kenn Rojo  Is the Representative on the appropriate HIPAA document in Epic?  Yes

## 2023-01-30 NOTE — TELEPHONE ENCOUNTER
LAST VISIT:   1/27/2023     Future Appointments   Date Time Provider Department Center   3/3/2023 12:45 PM JULIAN West - CNP [facility]               normal...

## 2023-02-08 ENCOUNTER — TELEPHONE (OUTPATIENT)
Dept: PRIMARY CARE CLINIC | Age: 78
End: 2023-02-08

## 2023-02-08 DIAGNOSIS — I48.0 PAROXYSMAL ATRIAL FIBRILLATION (HCC): Primary | ICD-10-CM

## 2023-02-08 DIAGNOSIS — J43.9 PULMONARY EMPHYSEMA, UNSPECIFIED EMPHYSEMA TYPE (HCC): ICD-10-CM

## 2023-02-08 DIAGNOSIS — I50.32 CHRONIC DIASTOLIC HEART FAILURE (HCC): ICD-10-CM

## 2023-02-08 NOTE — TELEPHONE ENCOUNTER
Live Thompson from [facility] called and stated that she had received the order for DME on 2/6 but needs another one for a Hand Held Shower please. Fax3 [phone].  Thank you

## 2023-02-13 NOTE — ED PROVIDER NOTES
Check labs STVZ 4B STEPDOWN  Emergency Department Encounter  Emergency Medicine Resident     Pt Name: Madisyn Ko  MRN: [id number]  Ronnygfchiara 1945  Date of evaluation: 8/12/18  PCP:  Johnathon Malcolm MD    [address] [place] [address]       Chief Complaint   Patient presents with    Abdominal Pain     epigastric     Emesis    Nausea       HISTORY OF PRESENT ILLNESS  (Location/Symptom, Timing/Onset, Context/Setting, Quality, Duration, Modifying Factors, Severity.)      Madisyn Ko is a 71 y.o. male who presents with hematemesis. Patient reports he has vomited numerous times since yesterday; vomitus was initially bright red, now dark brown in color. He reports filling \"four urinals\" with dark vomitus. He reports nonradiating epigastric pain, made worse with eating. Patient denies any blood in his stool or any dark, tarry stool. He also reports intermittent fever. He denies any other symptoms; no vision changes, chest pain, increased shortness of breath, dysuria. History of vertical band gastroplasty approximately 30 years ago. Recently diagnosed gastric pouch ulcer. Is scheduled for repeat EGD on Friday. History of atrial fibrillation; takes xarelto. Takes oral PPI at home. PAST MEDICAL / SURGICAL / SOCIAL / FAMILY HISTORY      has a past medical history of Asthma; Atrial fibrillation ([address] GERD (gastroesophageal reflux disease); Hyperlipidemia; Hypertension; Obesity; Osteoarthritis; and Unspecified sleep apnea. has a past surgical history that includes Finger amputation (1966); Gastric bypass surgery (1985); Carpal tunnel release; Colonoscopy; Rotator cuff repair; joint replacement (2004 & 2005); Hemorrhoid surgery; and pr egd transoral biopsy single/multiple (N/A, 5/25/2018). Social History     Social History    Marital status:      Spouse name: N/A    Number of children: N/A    Years of education: N/A     Occupational History    Not on file.      Social History Main Topics    Smoking status: Former Smoker     Packs/day: 1.00     Years: 20.00     Quit date: 12/14/1976    Smokeless tobacco: Never Used    Alcohol use No    Drug use: No    Sexual activity: Not on file     Other Topics Concern    Not on file     Social History Narrative    No narrative on file       Family History   Problem Relation Age of Onset    Cancer Mother     Heart Attack Father        Allergies:  Darvocet [propoxyphene n-acetaminophen]; Other; and Oxycodone-acetaminophen    Home Medications:  Prior to Admission medications    Medication Sig Start Date End Date Taking? Authorizing Provider   pantoprazole (PROTONIX) 40 MG tablet Take 1 tablet by mouth daily 6/21/18   Belsano Quinton, DO   sucralfate (CARAFATE) 1 GM tablet Take 1 tablet by mouth 4 times daily 6/21/18   Belsano Quinton, DO   Incontinence Supply Disposable (INCONTINENCE BRIEF LARGE) MISC To use as directed.  Use 3x a day 6/20/18   Jed Sterling, PA-C   polyethylene glycol [facility]) powder Take 17 g by mouth daily 6/20/18   Jed Sterling, PA-C   rivaroxaban (XARELTO) 20 MG TABS tablet Take 1 tablet by mouth daily (with breakfast) 6/20/18   Jed Sterling, PA-C   bumetanide (BUMEX) 1 MG tablet TAKE 1 TABLET BY MOUTH TWICE A DAY 6/20/18   Jed Sterling, PA-C   potassium chloride (KLOR-CON M20) 20 MEQ extended release tablet TAKE 3 TABLETS BY MOUTH TWICE A DAY 6/20/18   Jed Sterling, PA-C   gabapentin (NEURONTIN) 300 MG capsule TAKE ONE CAPSULE BY MOUTH 3 TIMES A DAY. 6/20/18 12/17/18  Jed Sterling, PA-C   cetirizine (ZYRTEC) 10 MG tablet Take 1 tablet by mouth daily 6/20/18   Jed Sterling, PA-C   cyclobenzaprine (FLEXERIL) 10 MG tablet Take 1 tablet by mouth nightly as needed for Muscle spasms 6/20/18   Jed Sterling, PA-C   spironolactone (ALDACTONE) 25 MG tablet Take 1 tablet by mouth daily 6/20/18   Jed Sterling, PA-C   albuterol (PROVENTIL) (2.5 MG/3ML) 0.083% nebulizer solution Take 3 mLs by perforated ulcer, hardeep-hurst tear, gastritis, gastroenteritis    DIAGNOSTIC RESULTS / EMERGENCY DEPARTMENT COURSE / MDM     LABS:  Results for orders placed or performed during the hospital encounter of 08/12/18   CBC   Result Value Ref Range    WBC 11.0 3.5 - 11.3 k/uL    RBC 4.11 (L) 4.21 - 5.77 m/uL    Hemoglobin 12.0 (L) 13.0 - 17.0 g/dL    Hematocrit 38.3 (L) 40.7 - 50.3 %    MCV 93.2 82.6 - 102.9 fL    MCH 29.2 25.2 - 33.5 pg    MCHC 31.3 28.4 - 34.8 g/dL    RDW 14.6 (H) 11.8 - 14.4 %    Platelets 924 918 - 132 k/uL    MPV 8.9 8.1 - 13.5 fL    NRBC Automated 0.0 0.0 per 100 WBC   LIPASE   Result Value Ref Range    Lipase 8 (L) 13 - 60 U/L   BASIC METABOLIC PANEL   Result Value Ref Range    Glucose 109 (H) 70 - 99 mg/dL    BUN 15 8 - 23 mg/dL    CREATININE 0.84 0.70 - 1.20 mg/dL    Bun/Cre Ratio NOT REPORTED 9 - 20    Calcium 8.8 8.6 - 10.4 mg/dL    Sodium 137 135 - 144 mmol/L    Potassium 3.7 3.7 - 5.3 mmol/L    Chloride 98 98 - 107 mmol/L    CO2 28 20 - 31 mmol/L    Anion Gap 11 9 - 17 mmol/L    GFR Non-African American >60 >60 mL/min    GFR African American >60 >60 mL/min    GFR Comment          GFR Staging NOT REPORTED    HEPATIC FUNCTION PANEL   Result Value Ref Range    Alb 3.6 3.5 - 5.2 g/dL    Alkaline Phosphatase 74 40 - 129 U/L    ALT 6 5 - 41 U/L    AST 14 <40 U/L    Total Bilirubin 1.09 0.3 - 1.2 mg/dL    Bilirubin, Direct 0.38 (H) <0.31 mg/dL    Bilirubin, Indirect 0.71 0.00 - 1.00 mg/dL    Total Protein 6.8 6.4 - 8.3 g/dL    Globulin NOT REPORTED 1.5 - 3.8 g/dL    Albumin/Globulin Ratio 1.1 1.0 - 2.5   Protime-INR   Result Value Ref Range    Protime 14.3 (H) 9.0 - 12.0 sec    INR 1.4    Lactic Acid, Whole Blood   Result Value Ref Range    Lactic Acid, Whole Blood 1.0 0.7 - 2.1 mmol/L   APTT   Result Value Ref Range    PTT 29.9 20.5 - 30.5 sec   Comprehensive Metabolic Panel w/ Reflex to MG   Result Value Ref Range    Glucose 114 (H) 70 - 99 mg/dL    BUN 14 8 - 23 mg/dL    CREATININE 0.88 0.70 - Atrial Rate 81 BPM    QRS Duration 118 ms    Q-T Interval 436 ms    QTc Calculation (Bazett) 477 ms    R Axis 150 degrees    T Axis -3 degrees   TYPE AND SCREEN   Result Value Ref Range    Expiration Date 08/15/2018     Arm Band Number [id number]     ABO/Rh O POSITIVE     Antibody Screen NEGATIVE        IMPRESSION: 78yo male patient with history of vertical band gastroplasty ~30 years ago presents with possible hematemesis, fever, and epigastric pain since yesterday. Patient is hemodynamically stable, afebrile, and in no acute distress in the ED. Physical examination is unremarkable. No acute abdomen - abdomen soft, nontender, nondistended, no palpable masses. No active vomiting in ED. Possible upper GI bleed. Low clinical suspicion for ACS; rule out with EKG, troponins, CXR. Plan for CBC, BMP, hepatic function panel, lipase, coags, lactic acid, type and screen. Protonix bolus & drip. Bariatrics consult. RADIOLOGY:  XR CHEST STANDARD (2 VW) (Final result)   Result time 08/12/18 21:24:38   Final result by Mirta Gordon MD (08/12/18 21:24:38)                Impression:    No acute process. Narrative:    EXAMINATION:  TWO VIEWS OF THE CHEST    8/12/2018 8:59 pm    COMPARISON:  04/06/2018    HISTORY:  ORDERING SYSTEM PROVIDED HISTORY: SOB; hematemesis; hx gastric ulcer  TECHNOLOGIST PROVIDED HISTORY:  Reason for exam:->SOB; hematemesis; hx gastric ulcer    FINDINGS:  The lungs are without acute focal process.  There is no effusion or  pneumothorax. The cardiomediastinal silhouette is stable. The osseous  structures are stable.                      EKG  No acute ischemic changes    All EKG's are interpreted by the Emergency Department Physician who either signs or Co-signs this chart in the absence of a cardiologist.    EMERGENCY DEPARTMENT COURSE:    EKG unremarkable. CXR unremarkable. Negative serial troponins. Hemoglobin 12.0, which is baseline for patient.   Unremarkable BMP, LFTs, lipase. Patient comfortable, no vomiting in ED. Bariatric surgery has evaluated patient and recommends Internal Medicine admit; keep NPO; upper GI in 1-2 days. Internal medicine has accepted patient for admission. Patient history, exam findings, investigation results, and plan were discussed. [facility] PROCEDURES:  None    CONSULTS:  IP CONSULT TO BARIATRICS  IP CONSULT TO INTERNAL MEDICINE  IP CONSULT TO SOCIAL WORK    CRITICAL CARE:  None    FINAL IMPRESSION      1.  Hematemesis with nausea          DISPOSITION / PLAN     DISPOSITION Admitted 08/13/2018 12:01:11 AM      PATIENT REFERRED TO:  Emmanuel Turcios MD  [facility], [address] [facility], [facility] [address]  [phone]            DISCHARGE MEDICATIONS:  Current Discharge Medication List          Livan Sneed MD  Emergency Medicine Resident    (Please note that portions of this note were completed with a voice recognition program.  Efforts were made to edit the dictations but occasionally words are mis-transcribed.)        Livan Sneed MD  08/13/18 [id number]

## 2023-02-22 ENCOUNTER — HOSPITAL ENCOUNTER (OUTPATIENT)
Age: 78
Setting detail: SPECIMEN
Discharge: HOME | End: 2023-02-22

## 2023-02-22 LAB
ANION GAP SERPL CALCULATED.3IONS-SCNC: 10 MMOL/L (ref 9–17)
BUN SERPL-MCNC: 16 MG/DL (ref 8–23)
BUN/CREAT BLD: 29 (ref 9–20)
CALCIUM SERPL-MCNC: 9.2 MG/DL (ref 8.6–10.4)
CHLORIDE SERPL-SCNC: 94 MMOL/L (ref 98–107)
CO2 SERPL-SCNC: 30 MMOL/L (ref 20–31)
CREAT SERPL-MCNC: 0.56 MG/DL (ref 0.7–1.2)
GFR SERPL CREATININE-BSD FRML MDRD: >60 ML/MIN/1.73M2
GLUCOSE SERPL-MCNC: 90 MG/DL (ref 70–99)
HCT VFR BLD AUTO: 29.9 % (ref 40.7–50.3)
HGB BLD-MCNC: 9.4 G/DL (ref 13–17)
MCH RBC QN AUTO: 32.6 PG (ref 25.2–33.5)
MCHC RBC AUTO-ENTMCNC: 31.4 G/DL (ref 28.4–34.8)
MCV RBC AUTO: 103.8 FL (ref 82.6–102.9)
NRBC AUTOMATED: 0 PER 100 WBC
PDW BLD-RTO: 16.9 % (ref 11.8–14.4)
PLATELET # BLD AUTO: 208 K/UL (ref 138–453)
PMV BLD AUTO: 8.5 FL (ref 8.1–13.5)
POTASSIUM SERPL-SCNC: 3.8 MMOL/L (ref 3.7–5.3)
RBC # BLD: 2.88 M/UL (ref 4.21–5.77)
SODIUM SERPL-SCNC: 134 MMOL/L (ref 135–144)
WBC # BLD AUTO: 4.1 K/UL (ref 3.5–11.3)

## 2023-02-22 PROCEDURE — 85027 COMPLETE CBC AUTOMATED: CPT

## 2023-02-22 PROCEDURE — 80048 BASIC METABOLIC PNL TOTAL CA: CPT

## 2023-02-22 PROCEDURE — 36415 COLL VENOUS BLD VENIPUNCTURE: CPT

## 2023-02-22 PROCEDURE — P9603 ONE-WAY ALLOW PRORATED MILES: HCPCS

## 2023-02-22 PROCEDURE — 81001 URINALYSIS AUTO W/SCOPE: CPT

## 2023-02-23 LAB
BACTERIA: ABNORMAL
BILIRUBIN URINE: NEGATIVE
CASTS UA: ABNORMAL /LPF (ref 0–8)
COLOR: YELLOW
EPITHELIAL CELLS UA: ABNORMAL /HPF (ref 0–5)
GLUCOSE UR STRIP.AUTO-MCNC: NEGATIVE MG/DL
KETONES UR STRIP.AUTO-MCNC: NEGATIVE MG/DL
LEUKOCYTE ESTERASE UR QL STRIP.AUTO: ABNORMAL
NITRITE UR QL STRIP.AUTO: NEGATIVE
PROT UR STRIP.AUTO-MCNC: 7.5 MG/DL (ref 5–8)
PROT UR STRIP.AUTO-MCNC: ABNORMAL MG/DL
RBC CLUMPS #/AREA URNS AUTO: ABNORMAL /HPF (ref 0–4)
SPECIFIC GRAVITY UA: 1.01 (ref 1–1.03)
TURBIDITY: ABNORMAL
URINE HGB: ABNORMAL
UROBILINOGEN, URINE: NORMAL
WBC UA: ABNORMAL /HPF (ref 0–5)

## 2023-02-23 PROCEDURE — 87077 CULTURE AEROBIC IDENTIFY: CPT

## 2023-02-23 PROCEDURE — 87086 URINE CULTURE/COLONY COUNT: CPT

## 2023-02-23 PROCEDURE — 87186 SC STD MICRODIL/AGAR DIL: CPT

## 2023-02-24 LAB
MICROORGANISM SPEC CULT: ABNORMAL
MICROORGANISM SPEC CULT: ABNORMAL
SPECIMEN DESCRIPTION: ABNORMAL

## 2023-02-27 DIAGNOSIS — G25.81 RLS (RESTLESS LEGS SYNDROME): ICD-10-CM

## 2023-02-28 ENCOUNTER — HOSPITAL ENCOUNTER (OUTPATIENT)
Age: 78
Setting detail: SPECIMEN
Discharge: HOME | End: 2023-02-28

## 2023-02-28 DIAGNOSIS — N40.1 BENIGN PROSTATIC HYPERPLASIA WITH URINARY HESITANCY: Primary | ICD-10-CM

## 2023-02-28 DIAGNOSIS — R39.11 BENIGN PROSTATIC HYPERPLASIA WITH URINARY HESITANCY: Primary | ICD-10-CM

## 2023-02-28 LAB
ANION GAP SERPL CALCULATED.3IONS-SCNC: 7 MMOL/L (ref 9–17)
BUN SERPL-MCNC: 16 MG/DL (ref 8–23)
BUN/CREAT BLD: 25 (ref 9–20)
CALCIUM SERPL-MCNC: 9 MG/DL (ref 8.6–10.4)
CHLORIDE SERPL-SCNC: 95 MMOL/L (ref 98–107)
CO2 SERPL-SCNC: 32 MMOL/L (ref 20–31)
CREAT SERPL-MCNC: 0.64 MG/DL (ref 0.7–1.2)
GFR SERPL CREATININE-BSD FRML MDRD: >60 ML/MIN/1.73M2
GLUCOSE SERPL-MCNC: 99 MG/DL (ref 70–99)
HCT VFR BLD AUTO: 29.3 % (ref 40.7–50.3)
HGB BLD-MCNC: 9 G/DL (ref 13–17)
MCH RBC QN AUTO: 32.7 PG (ref 25.2–33.5)
MCHC RBC AUTO-ENTMCNC: 30.7 G/DL (ref 28.4–34.8)
MCV RBC AUTO: 106.5 FL (ref 82.6–102.9)
NRBC AUTOMATED: 0 PER 100 WBC
PDW BLD-RTO: 16.3 % (ref 11.8–14.4)
PLATELET # BLD AUTO: 170 K/UL (ref 138–453)
PMV BLD AUTO: 8.7 FL (ref 8.1–13.5)
POTASSIUM SERPL-SCNC: 4 MMOL/L (ref 3.7–5.3)
RBC # BLD: 2.75 M/UL (ref 4.21–5.77)
SODIUM SERPL-SCNC: 134 MMOL/L (ref 135–144)
WBC # BLD AUTO: 3.2 K/UL (ref 3.5–11.3)

## 2023-02-28 PROCEDURE — 85027 COMPLETE CBC AUTOMATED: CPT

## 2023-02-28 PROCEDURE — 36415 COLL VENOUS BLD VENIPUNCTURE: CPT

## 2023-02-28 PROCEDURE — 80048 BASIC METABOLIC PNL TOTAL CA: CPT

## 2023-02-28 PROCEDURE — P9603 ONE-WAY ALLOW PRORATED MILES: HCPCS

## 2023-02-28 RX ORDER — MAGNESIUM OXIDE 400 MG/1
TABLET ORAL
Qty: 30 TABLET | Refills: 0 | Status: SHIPPED | OUTPATIENT
Start: 2023-02-28

## 2023-02-28 RX ORDER — ROSUVASTATIN CALCIUM 10 MG/1
10 TABLET, COATED ORAL NIGHTLY
Qty: 30 TABLET | Refills: 0 | Status: SHIPPED | OUTPATIENT
Start: 2023-02-28

## 2023-02-28 RX ORDER — SPIRONOLACTONE 25 MG/1
TABLET ORAL
Qty: 30 TABLET | Refills: 0 | Status: SHIPPED | OUTPATIENT
Start: 2023-02-28

## 2023-02-28 RX ORDER — MIDODRINE HYDROCHLORIDE 5 MG/1
TABLET ORAL
Qty: 90 TABLET | Refills: 0 | Status: SHIPPED | OUTPATIENT
Start: 2023-02-28

## 2023-02-28 RX ORDER — TIZANIDINE 4 MG/1
TABLET ORAL
Qty: 30 TABLET | Refills: 1 | Status: SHIPPED | OUTPATIENT
Start: 2023-02-28

## 2023-03-02 ENCOUNTER — HOSPITAL ENCOUNTER (OUTPATIENT)
Age: 78
Setting detail: SPECIMEN
Discharge: HOME | End: 2023-03-02

## 2023-03-02 LAB
ABSOLUTE EOS #: 0.08 K/UL (ref 0–0.4)
ABSOLUTE IMMATURE GRANULOCYTE: 0 K/UL (ref 0–0.3)
ABSOLUTE LYMPH #: 0.6 K/UL (ref 1–4.8)
ABSOLUTE MONO #: 0.28 K/UL (ref 0.1–0.8)
ABSOLUTE RETIC #: 0.06 M/UL (ref 0.03–0.08)
BASOPHILS # BLD: 1 % (ref 0–2)
BASOPHILS ABSOLUTE: 0.04 K/UL (ref 0–0.2)
EOSINOPHILS RELATIVE PERCENT: 2 % (ref 1–4)
FERRITIN SERPL-MCNC: 206 NG/ML (ref 30–400)
FOLATE SERPL-MCNC: 5 NG/ML
HCT VFR BLD AUTO: 31.1 % (ref 40.7–50.3)
HGB BLD-MCNC: 9.6 G/DL (ref 13–17)
IMMATURE GRANULOCYTES: 0 %
IMMATURE RETIC FRACT: 19.6 % (ref 2.7–18.3)
IRON SATURATION: 33 % (ref 20–55)
IRON SERPL-MCNC: 57 UG/DL (ref 59–158)
LYMPHOCYTES # BLD: 15 % (ref 24–44)
MCH RBC QN AUTO: 32.9 PG (ref 25.2–33.5)
MCHC RBC AUTO-ENTMCNC: 30.9 G/DL (ref 28.4–34.8)
MCV RBC AUTO: 106.5 FL (ref 82.6–102.9)
MONOCYTES # BLD: 7 % (ref 1–7)
MORPHOLOGY: ABNORMAL
MORPHOLOGY: ABNORMAL
NRBC AUTOMATED: 0 PER 100 WBC
PDW BLD-RTO: 16.3 % (ref 11.8–14.4)
PLATELET # BLD AUTO: 191 K/UL (ref 138–453)
PMV BLD AUTO: 8.6 FL (ref 8.1–13.5)
RBC # BLD: 2.92 M/UL (ref 4.21–5.77)
RETIC HEMOGLOBIN: 33 PG (ref 28.2–35.7)
RETICS/RBC NFR AUTO: 1.9 % (ref 0.5–1.9)
SEG NEUTROPHILS: 75 % (ref 36–66)
SEGMENTED NEUTROPHILS ABSOLUTE COUNT: 3 K/UL (ref 1.8–7.7)
TIBC SERPL-MCNC: 173 UG/DL (ref 250–450)
UNSATURATED IRON BINDING CAPACITY: 116 UG/DL (ref 112–347)
VIT B12 SERPL-MCNC: 394 PG/ML (ref 232–1245)
WBC # BLD AUTO: 4 K/UL (ref 3.5–11.3)

## 2023-03-02 PROCEDURE — 82746 ASSAY OF FOLIC ACID SERUM: CPT

## 2023-03-02 PROCEDURE — 82607 VITAMIN B-12: CPT

## 2023-03-02 PROCEDURE — 85027 COMPLETE CBC AUTOMATED: CPT

## 2023-03-02 PROCEDURE — 83540 ASSAY OF IRON: CPT

## 2023-03-02 PROCEDURE — 83550 IRON BINDING TEST: CPT

## 2023-03-02 PROCEDURE — P9603 ONE-WAY ALLOW PRORATED MILES: HCPCS

## 2023-03-02 PROCEDURE — 82728 ASSAY OF FERRITIN: CPT

## 2023-03-02 PROCEDURE — 36415 COLL VENOUS BLD VENIPUNCTURE: CPT

## 2023-03-02 PROCEDURE — 85045 AUTOMATED RETICULOCYTE COUNT: CPT

## 2023-03-03 LAB — SURGICAL PATHOLOGY REPORT: NORMAL

## 2023-03-04 LAB — PATH REV BLD -IMP: NORMAL

## 2023-03-15 ENCOUNTER — HOSPITAL ENCOUNTER (OUTPATIENT)
Age: 78
Setting detail: SPECIMEN
Discharge: HOME | End: 2023-03-15
Payer: COMMERCIAL

## 2023-03-15 LAB
ABSOLUTE EOS #: 0.05 K/UL (ref 0–0.4)
ABSOLUTE IMMATURE GRANULOCYTE: 0 K/UL (ref 0–0.3)
ABSOLUTE LYMPH #: 0.52 K/UL (ref 1–4.8)
ABSOLUTE MONO #: 0.56 K/UL (ref 0.2–0.8)
ANION GAP SERPL CALCULATED.3IONS-SCNC: 8 MMOL/L (ref 9–17)
BASOPHILS # BLD: 0 %
BASOPHILS ABSOLUTE: 0 K/UL (ref 0–0.2)
BNP SERPL-MCNC: ABNORMAL PG/ML
BUN SERPL-MCNC: 14 MG/DL (ref 8–23)
BUN/CREAT BLD: 29 (ref 9–20)
CALCIUM SERPL-MCNC: 8.6 MG/DL (ref 8.6–10.4)
CHLORIDE SERPL-SCNC: 95 MMOL/L (ref 98–107)
CO2 SERPL-SCNC: 31 MMOL/L (ref 20–31)
CREAT SERPL-MCNC: 0.49 MG/DL (ref 0.7–1.2)
EOSINOPHILS RELATIVE PERCENT: 1 % (ref 1–4)
GFR SERPL CREATININE-BSD FRML MDRD: >60 ML/MIN/1.73M2
GLUCOSE SERPL-MCNC: 90 MG/DL (ref 70–99)
HCT VFR BLD AUTO: 30.3 % (ref 40.7–50.3)
HGB BLD-MCNC: 9.5 G/DL (ref 13–17)
IMMATURE GRANULOCYTES: 0 %
LYMPHOCYTES # BLD: 11 % (ref 24–44)
MCH RBC QN AUTO: 32.9 PG (ref 25.2–33.5)
MCHC RBC AUTO-ENTMCNC: 31.4 G/DL (ref 28.4–34.8)
MCV RBC AUTO: 104.8 FL (ref 82.6–102.9)
MONOCYTES # BLD: 12 % (ref 1–7)
NRBC AUTOMATED: 0 PER 100 WBC
PDW BLD-RTO: 15.4 % (ref 11.8–14.4)
PLATELET # BLD AUTO: 131 K/UL (ref 138–453)
PMV BLD AUTO: 8.9 FL (ref 8.1–13.5)
POTASSIUM SERPL-SCNC: 3.5 MMOL/L (ref 3.7–5.3)
RBC # BLD: 2.89 M/UL (ref 4.21–5.77)
SEG NEUTROPHILS: 76 % (ref 36–66)
SEGMENTED NEUTROPHILS ABSOLUTE COUNT: 3.57 K/UL (ref 1.8–7.7)
SODIUM SERPL-SCNC: 134 MMOL/L (ref 135–144)
WBC # BLD AUTO: 4.7 K/UL (ref 3.5–11.3)

## 2023-03-15 PROCEDURE — 36415 COLL VENOUS BLD VENIPUNCTURE: CPT

## 2023-03-15 PROCEDURE — 80048 BASIC METABOLIC PNL TOTAL CA: CPT

## 2023-03-15 PROCEDURE — 83880 ASSAY OF NATRIURETIC PEPTIDE: CPT

## 2023-03-15 PROCEDURE — P9603 ONE-WAY ALLOW PRORATED MILES: HCPCS

## 2023-03-15 PROCEDURE — 85025 COMPLETE CBC W/AUTO DIFF WBC: CPT

## 2023-03-23 ENCOUNTER — HOSPITAL ENCOUNTER (OUTPATIENT)
Age: 78
Setting detail: SPECIMEN
Discharge: HOME | End: 2023-03-23
Payer: COMMERCIAL

## 2023-03-23 LAB
BILIRUBIN URINE: NEGATIVE
CASTS UA: NORMAL /LPF (ref 0–8)
COLOR: YELLOW
EPITHELIAL CELLS UA: NORMAL /HPF (ref 0–5)
GLUCOSE UR STRIP.AUTO-MCNC: NEGATIVE MG/DL
KETONES UR STRIP.AUTO-MCNC: NEGATIVE MG/DL
LEUKOCYTE ESTERASE UR QL STRIP.AUTO: ABNORMAL
NITRITE UR QL STRIP.AUTO: NEGATIVE
PROT UR STRIP.AUTO-MCNC: 5.5 MG/DL (ref 5–8)
PROT UR STRIP.AUTO-MCNC: NEGATIVE MG/DL
RBC CLUMPS #/AREA URNS AUTO: NORMAL /HPF (ref 0–4)
SPECIFIC GRAVITY UA: 1.01 (ref 1–1.03)
TURBIDITY: CLEAR
URINE HGB: ABNORMAL
UROBILINOGEN, URINE: NORMAL
WBC UA: NORMAL /HPF (ref 0–5)

## 2023-03-23 PROCEDURE — 87086 URINE CULTURE/COLONY COUNT: CPT

## 2023-03-23 PROCEDURE — 81001 URINALYSIS AUTO W/SCOPE: CPT

## 2023-03-24 LAB
MICROORGANISM SPEC CULT: NORMAL
SPECIMEN DESCRIPTION: NORMAL

## 2023-04-01 ENCOUNTER — HOSPITAL ENCOUNTER (OUTPATIENT)
Age: 78
Setting detail: SPECIMEN
Discharge: HOME | End: 2023-04-01

## 2023-04-01 LAB — MONONUCLEOSIS SCREEN: NEGATIVE

## 2023-04-01 PROCEDURE — 86308 HETEROPHILE ANTIBODY SCREEN: CPT

## 2023-04-01 PROCEDURE — P9603 ONE-WAY ALLOW PRORATED MILES: HCPCS

## 2023-04-01 PROCEDURE — 36415 COLL VENOUS BLD VENIPUNCTURE: CPT

## 2023-04-06 ENCOUNTER — HOSPITAL ENCOUNTER (OUTPATIENT)
Age: 78
Setting detail: SPECIMEN
Discharge: HOME | End: 2023-04-06

## 2023-04-06 LAB
ANION GAP SERPL CALCULATED.3IONS-SCNC: 8 MMOL/L (ref 9–17)
BNP SERPL-MCNC: 8431 PG/ML
BUN SERPL-MCNC: 13 MG/DL (ref 8–23)
BUN/CREAT BLD: 32 (ref 9–20)
CALCIUM SERPL-MCNC: 8.4 MG/DL (ref 8.6–10.4)
CHLORIDE SERPL-SCNC: 94 MMOL/L (ref 98–107)
CO2 SERPL-SCNC: 35 MMOL/L (ref 20–31)
CREAT SERPL-MCNC: 0.41 MG/DL (ref 0.7–1.2)
GFR SERPL CREATININE-BSD FRML MDRD: >60 ML/MIN/1.73M2
GLUCOSE SERPL-MCNC: 84 MG/DL (ref 70–99)
HCT VFR BLD AUTO: 29.9 % (ref 40.7–50.3)
HGB BLD-MCNC: 9.3 G/DL (ref 13–17)
MCH RBC QN AUTO: 32.6 PG (ref 25.2–33.5)
MCHC RBC AUTO-ENTMCNC: 31.1 G/DL (ref 28.4–34.8)
MCV RBC AUTO: 104.9 FL (ref 82.6–102.9)
NRBC AUTOMATED: 0 PER 100 WBC
PDW BLD-RTO: 15 % (ref 11.8–14.4)
PLATELET # BLD AUTO: 155 K/UL (ref 138–453)
PMV BLD AUTO: 8.9 FL (ref 8.1–13.5)
POTASSIUM SERPL-SCNC: 3.3 MMOL/L (ref 3.7–5.3)
RBC # BLD: 2.85 M/UL (ref 4.21–5.77)
SERUM OSMOLALITY: 281 MOSM/KG (ref 275–295)
SODIUM SERPL-SCNC: 137 MMOL/L (ref 135–144)
WBC # BLD AUTO: 3.7 K/UL (ref 3.5–11.3)

## 2023-04-06 PROCEDURE — 83880 ASSAY OF NATRIURETIC PEPTIDE: CPT

## 2023-04-06 PROCEDURE — 36415 COLL VENOUS BLD VENIPUNCTURE: CPT

## 2023-04-06 PROCEDURE — 80048 BASIC METABOLIC PNL TOTAL CA: CPT

## 2023-04-06 PROCEDURE — 83930 ASSAY OF BLOOD OSMOLALITY: CPT

## 2023-04-06 PROCEDURE — 85027 COMPLETE CBC AUTOMATED: CPT

## 2023-04-07 ENCOUNTER — HOSPITAL ENCOUNTER (OUTPATIENT)
Age: 78
Setting detail: SPECIMEN
Discharge: HOME | End: 2023-04-07

## 2023-04-07 LAB
ABSOLUTE EOS #: 0.2 K/UL (ref 0–0.44)
ABSOLUTE IMMATURE GRANULOCYTE: 0.02 K/UL (ref 0–0.3)
ABSOLUTE LYMPH #: 0.76 K/UL (ref 1.1–3.7)
ABSOLUTE MONO #: 0.78 K/UL (ref 0.1–1.2)
ALBUMIN SERPL-MCNC: 3 G/DL (ref 3.5–5.2)
ALP SERPL-CCNC: 272 U/L (ref 40–129)
ALT SERPL-CCNC: 9 U/L (ref 5–41)
ANION GAP SERPL CALCULATED.3IONS-SCNC: 8 MMOL/L (ref 9–17)
AST SERPL-CCNC: 30 U/L
BASOPHILS # BLD: 1 % (ref 0–2)
BASOPHILS ABSOLUTE: 0.05 K/UL (ref 0–0.2)
BILIRUB SERPL-MCNC: 0.9 MG/DL (ref 0.3–1.2)
BUN SERPL-MCNC: 14 MG/DL (ref 8–23)
BUN/CREAT BLD: 29 (ref 9–20)
CALCIUM SERPL-MCNC: 8.5 MG/DL (ref 8.6–10.4)
CHLORIDE SERPL-SCNC: 91 MMOL/L (ref 98–107)
CO2 SERPL-SCNC: 34 MMOL/L (ref 20–31)
CREAT SERPL-MCNC: 0.48 MG/DL (ref 0.7–1.2)
EOSINOPHILS RELATIVE PERCENT: 3 % (ref 1–4)
GFR SERPL CREATININE-BSD FRML MDRD: >60 ML/MIN/1.73M2
GLUCOSE SERPL-MCNC: 98 MG/DL (ref 70–99)
HCT VFR BLD AUTO: 31.1 % (ref 40.7–50.3)
HGB BLD-MCNC: 9.5 G/DL (ref 13–17)
IMMATURE GRANULOCYTES: 0 %
LYMPHOCYTES # BLD: 12 % (ref 24–43)
MAGNESIUM SERPL-MCNC: 1.9 MG/DL (ref 1.6–2.6)
MCH RBC QN AUTO: 32.1 PG (ref 25.2–33.5)
MCHC RBC AUTO-ENTMCNC: 30.5 G/DL (ref 28.4–34.8)
MCV RBC AUTO: 105.1 FL (ref 82.6–102.9)
MONOCYTES # BLD: 12 % (ref 3–12)
NRBC AUTOMATED: 0 PER 100 WBC
PDW BLD-RTO: 14.9 % (ref 11.8–14.4)
PHOSPHATE SERPL-MCNC: 2.4 MG/DL (ref 2.5–4.5)
PLATELET # BLD AUTO: 189 K/UL (ref 138–453)
PMV BLD AUTO: 8.7 FL (ref 8.1–13.5)
POTASSIUM SERPL-SCNC: 3.3 MMOL/L (ref 3.7–5.3)
PROT SERPL-MCNC: 6.3 G/DL (ref 6.4–8.3)
RBC # BLD: 2.96 M/UL (ref 4.21–5.77)
RBC # BLD: ABNORMAL 10*6/UL
RBC # BLD: ABNORMAL 10*6/UL
SEG NEUTROPHILS: 71 % (ref 36–65)
SEGMENTED NEUTROPHILS ABSOLUTE COUNT: 4.48 K/UL (ref 1.5–8.1)
SODIUM SERPL-SCNC: 133 MMOL/L (ref 135–144)
WBC # BLD AUTO: 6.3 K/UL (ref 3.5–11.3)

## 2023-04-07 PROCEDURE — 83735 ASSAY OF MAGNESIUM: CPT

## 2023-04-07 PROCEDURE — P9603 ONE-WAY ALLOW PRORATED MILES: HCPCS

## 2023-04-07 PROCEDURE — 85025 COMPLETE CBC W/AUTO DIFF WBC: CPT

## 2023-04-07 PROCEDURE — 84100 ASSAY OF PHOSPHORUS: CPT

## 2023-04-07 PROCEDURE — 80053 COMPREHEN METABOLIC PANEL: CPT

## 2023-04-07 PROCEDURE — 36415 COLL VENOUS BLD VENIPUNCTURE: CPT

## 2023-04-19 ENCOUNTER — HOSPITAL ENCOUNTER (OUTPATIENT)
Age: 78
Setting detail: SPECIMEN
Discharge: HOME | End: 2023-04-19

## 2023-04-19 LAB
ABSOLUTE EOS #: 0.27 K/UL (ref 0–0.44)
ABSOLUTE IMMATURE GRANULOCYTE: 0 K/UL (ref 0–0.3)
ABSOLUTE LYMPH #: 0.68 K/UL (ref 1.1–3.7)
ABSOLUTE MONO #: 0.5 K/UL (ref 0.1–1.2)
ALBUMIN SERPL-MCNC: 3.5 G/DL (ref 3.5–5.2)
ALP SERPL-CCNC: 214 U/L (ref 40–129)
ALT SERPL-CCNC: 8 U/L (ref 5–41)
ANION GAP SERPL CALCULATED.3IONS-SCNC: 4 MMOL/L (ref 9–17)
ANION GAP SERPL CALCULATED.3IONS-SCNC: 9 MMOL/L (ref 9–17)
AST SERPL-CCNC: 22 U/L
BASOPHILS # BLD: 1 % (ref 0–2)
BASOPHILS ABSOLUTE: 0.05 K/UL (ref 0–0.2)
BILIRUB SERPL-MCNC: 1 MG/DL (ref 0.3–1.2)
BNP SERPL-MCNC: 9028 PG/ML
BUN SERPL-MCNC: 11 MG/DL (ref 8–23)
BUN SERPL-MCNC: 11 MG/DL (ref 8–23)
BUN/CREAT BLD: 21 (ref 9–20)
BUN/CREAT BLD: 22 (ref 9–20)
CALCIUM SERPL-MCNC: 8.6 MG/DL (ref 8.6–10.4)
CALCIUM SERPL-MCNC: 9 MG/DL (ref 8.6–10.4)
CHLORIDE SERPL-SCNC: 92 MMOL/L (ref 98–107)
CHLORIDE SERPL-SCNC: 95 MMOL/L (ref 98–107)
CO2 SERPL-SCNC: 33 MMOL/L (ref 20–31)
CO2 SERPL-SCNC: 35 MMOL/L (ref 20–31)
CREAT SERPL-MCNC: 0.51 MG/DL (ref 0.7–1.2)
CREAT SERPL-MCNC: 0.53 MG/DL (ref 0.7–1.2)
EOSINOPHILS RELATIVE PERCENT: 6 % (ref 1–4)
GFR SERPL CREATININE-BSD FRML MDRD: >60 ML/MIN/1.73M2
GFR SERPL CREATININE-BSD FRML MDRD: >60 ML/MIN/1.73M2
GLUCOSE SERPL-MCNC: 89 MG/DL (ref 70–99)
GLUCOSE SERPL-MCNC: 91 MG/DL (ref 70–99)
HCT VFR BLD AUTO: 29.8 % (ref 40.7–50.3)
HGB BLD-MCNC: 9.1 G/DL (ref 13–17)
IMMATURE GRANULOCYTES: 0 %
LYMPHOCYTES # BLD: 15 % (ref 24–43)
MAGNESIUM SERPL-MCNC: 1.8 MG/DL (ref 1.6–2.6)
MCH RBC QN AUTO: 32.2 PG (ref 25.2–33.5)
MCHC RBC AUTO-ENTMCNC: 30.5 G/DL (ref 28.4–34.8)
MCV RBC AUTO: 105.3 FL (ref 82.6–102.9)
MONOCYTES # BLD: 11 % (ref 3–12)
NRBC AUTOMATED: 0 PER 100 WBC
PDW BLD-RTO: 15 % (ref 11.8–14.4)
PHOSPHATE SERPL-MCNC: 2.7 MG/DL (ref 2.5–4.5)
PLATELET # BLD AUTO: 269 K/UL (ref 138–453)
PMV BLD AUTO: 8.9 FL (ref 8.1–13.5)
POTASSIUM SERPL-SCNC: 3.2 MMOL/L (ref 3.7–5.3)
POTASSIUM SERPL-SCNC: 3.4 MMOL/L (ref 3.7–5.3)
PROT SERPL-MCNC: 6.9 G/DL (ref 6.4–8.3)
RBC # BLD: 2.83 M/UL (ref 4.21–5.77)
SEG NEUTROPHILS: 67 % (ref 36–65)
SEGMENTED NEUTROPHILS ABSOLUTE COUNT: 3 K/UL (ref 1.5–8.1)
SODIUM SERPL-SCNC: 132 MMOL/L (ref 135–144)
SODIUM SERPL-SCNC: 136 MMOL/L (ref 135–144)
WBC # BLD AUTO: 4.5 K/UL (ref 3.5–11.3)

## 2023-04-19 PROCEDURE — 83735 ASSAY OF MAGNESIUM: CPT

## 2023-04-19 PROCEDURE — 80048 BASIC METABOLIC PNL TOTAL CA: CPT

## 2023-04-19 PROCEDURE — 83880 ASSAY OF NATRIURETIC PEPTIDE: CPT

## 2023-04-19 PROCEDURE — 36415 COLL VENOUS BLD VENIPUNCTURE: CPT

## 2023-04-19 PROCEDURE — P9603 ONE-WAY ALLOW PRORATED MILES: HCPCS

## 2023-04-19 PROCEDURE — 84100 ASSAY OF PHOSPHORUS: CPT

## 2023-04-19 PROCEDURE — 85025 COMPLETE CBC W/AUTO DIFF WBC: CPT

## 2023-04-19 PROCEDURE — 80053 COMPREHEN METABOLIC PANEL: CPT

## 2023-04-20 ENCOUNTER — HOSPITAL ENCOUNTER (OUTPATIENT)
Age: 78
Setting detail: SPECIMEN
Discharge: HOME | End: 2023-04-20

## 2023-04-20 LAB — SERUM OSMOLALITY: 295 MOSM/KG (ref 275–295)

## 2023-04-20 PROCEDURE — 36415 COLL VENOUS BLD VENIPUNCTURE: CPT

## 2023-04-20 PROCEDURE — P9603 ONE-WAY ALLOW PRORATED MILES: HCPCS

## 2023-04-20 PROCEDURE — 83930 ASSAY OF BLOOD OSMOLALITY: CPT

## 2023-04-21 ENCOUNTER — HOSPITAL ENCOUNTER (OUTPATIENT)
Age: 78
Setting detail: SPECIMEN
Discharge: HOME | End: 2023-04-21

## 2023-04-21 LAB
ABSOLUTE EOS #: 0.12 K/UL (ref 0–0.44)
ABSOLUTE IMMATURE GRANULOCYTE: 0.01 K/UL (ref 0–0.3)
ABSOLUTE LYMPH #: 0.74 K/UL (ref 1.1–3.7)
ABSOLUTE MONO #: 0.55 K/UL (ref 0.1–1.2)
ALBUMIN SERPL-MCNC: 3.3 G/DL (ref 3.5–5.2)
ALP SERPL-CCNC: 196 U/L (ref 40–129)
ALT SERPL-CCNC: 8 U/L (ref 5–41)
ANION GAP SERPL CALCULATED.3IONS-SCNC: 10 MMOL/L (ref 9–17)
ANION GAP SERPL CALCULATED.3IONS-SCNC: 7 MMOL/L (ref 9–17)
AST SERPL-CCNC: 21 U/L
BASOPHILS # BLD: 1 % (ref 0–2)
BASOPHILS ABSOLUTE: 0.05 K/UL (ref 0–0.2)
BILIRUB SERPL-MCNC: 0.8 MG/DL (ref 0.3–1.2)
BUN SERPL-MCNC: 10 MG/DL (ref 8–23)
BUN SERPL-MCNC: 10 MG/DL (ref 8–23)
BUN/CREAT BLD: 16 (ref 9–20)
BUN/CREAT BLD: 17 (ref 9–20)
CALCIUM SERPL-MCNC: 8.4 MG/DL (ref 8.6–10.4)
CALCIUM SERPL-MCNC: 8.5 MG/DL (ref 8.6–10.4)
CHLORIDE SERPL-SCNC: 95 MMOL/L (ref 98–107)
CHLORIDE SERPL-SCNC: 97 MMOL/L (ref 98–107)
CO2 SERPL-SCNC: 31 MMOL/L (ref 20–31)
CO2 SERPL-SCNC: 35 MMOL/L (ref 20–31)
CREAT SERPL-MCNC: 0.59 MG/DL (ref 0.7–1.2)
CREAT SERPL-MCNC: 0.61 MG/DL (ref 0.7–1.2)
EOSINOPHILS RELATIVE PERCENT: 3 % (ref 1–4)
GFR SERPL CREATININE-BSD FRML MDRD: >60 ML/MIN/1.73M2
GFR SERPL CREATININE-BSD FRML MDRD: >60 ML/MIN/1.73M2
GLUCOSE SERPL-MCNC: 107 MG/DL (ref 70–99)
GLUCOSE SERPL-MCNC: 87 MG/DL (ref 70–99)
HCT VFR BLD AUTO: 28.7 % (ref 40.7–50.3)
HGB BLD-MCNC: 8.9 G/DL (ref 13–17)
IMMATURE GRANULOCYTES: 0 %
LYMPHOCYTES # BLD: 19 % (ref 24–43)
MCH RBC QN AUTO: 32.5 PG (ref 25.2–33.5)
MCHC RBC AUTO-ENTMCNC: 31 G/DL (ref 28.4–34.8)
MCV RBC AUTO: 104.7 FL (ref 82.6–102.9)
MONOCYTES # BLD: 14 % (ref 3–12)
NRBC AUTOMATED: 0 PER 100 WBC
PDW BLD-RTO: 15 % (ref 11.8–14.4)
PLATELET # BLD AUTO: 222 K/UL (ref 138–453)
PMV BLD AUTO: 8.5 FL (ref 8.1–13.5)
POTASSIUM SERPL-SCNC: 3.4 MMOL/L (ref 3.7–5.3)
POTASSIUM SERPL-SCNC: 4 MMOL/L (ref 3.7–5.3)
PROT SERPL-MCNC: 6.3 G/DL (ref 6.4–8.3)
RBC # BLD: 2.74 M/UL (ref 4.21–5.77)
RBC # BLD: ABNORMAL 10*6/UL
RBC # BLD: ABNORMAL 10*6/UL
SEG NEUTROPHILS: 63 % (ref 36–65)
SEGMENTED NEUTROPHILS ABSOLUTE COUNT: 2.47 K/UL (ref 1.5–8.1)
SODIUM SERPL-SCNC: 136 MMOL/L (ref 135–144)
SODIUM SERPL-SCNC: 139 MMOL/L (ref 135–144)
WBC # BLD AUTO: 3.9 K/UL (ref 3.5–11.3)

## 2023-04-21 PROCEDURE — P9603 ONE-WAY ALLOW PRORATED MILES: HCPCS

## 2023-04-21 PROCEDURE — 85025 COMPLETE CBC W/AUTO DIFF WBC: CPT

## 2023-04-21 PROCEDURE — 36415 COLL VENOUS BLD VENIPUNCTURE: CPT

## 2023-04-21 PROCEDURE — 80053 COMPREHEN METABOLIC PANEL: CPT

## 2023-04-21 PROCEDURE — 80048 BASIC METABOLIC PNL TOTAL CA: CPT

## 2023-04-25 ENCOUNTER — HOSPITAL ENCOUNTER (OUTPATIENT)
Age: 78
Setting detail: SPECIMEN
Discharge: HOME | End: 2023-04-25

## 2023-04-25 LAB
ANION GAP SERPL CALCULATED.3IONS-SCNC: 12 MMOL/L (ref 9–17)
BUN SERPL-MCNC: 11 MG/DL (ref 8–23)
BUN/CREAT BLD: 18 (ref 9–20)
CALCIUM SERPL-MCNC: 8.8 MG/DL (ref 8.6–10.4)
CHLORIDE SERPL-SCNC: 92 MMOL/L (ref 98–107)
CO2 SERPL-SCNC: 35 MMOL/L (ref 20–31)
CREAT SERPL-MCNC: 0.62 MG/DL (ref 0.7–1.2)
GFR SERPL CREATININE-BSD FRML MDRD: >60 ML/MIN/1.73M2
GLUCOSE SERPL-MCNC: 76 MG/DL (ref 70–99)
POTASSIUM SERPL-SCNC: 3.7 MMOL/L (ref 3.7–5.3)
SODIUM SERPL-SCNC: 139 MMOL/L (ref 135–144)

## 2023-04-25 PROCEDURE — 36415 COLL VENOUS BLD VENIPUNCTURE: CPT

## 2023-04-25 PROCEDURE — P9603 ONE-WAY ALLOW PRORATED MILES: HCPCS

## 2023-04-25 PROCEDURE — 80048 BASIC METABOLIC PNL TOTAL CA: CPT

## 2023-05-01 ENCOUNTER — HOSPITAL ENCOUNTER (OUTPATIENT)
Age: 78
Setting detail: SPECIMEN
Discharge: HOME | End: 2023-05-01
Payer: COMMERCIAL

## 2023-05-01 LAB
ANION GAP SERPL CALCULATED.3IONS-SCNC: 7 MMOL/L (ref 9–17)
BUN SERPL-MCNC: 17 MG/DL (ref 8–23)
BUN/CREAT BLD: 25 (ref 9–20)
CALCIUM SERPL-MCNC: 9.1 MG/DL (ref 8.6–10.4)
CHLORIDE SERPL-SCNC: 93 MMOL/L (ref 98–107)
CO2 SERPL-SCNC: 37 MMOL/L (ref 20–31)
CREAT SERPL-MCNC: 0.68 MG/DL (ref 0.7–1.2)
GFR SERPL CREATININE-BSD FRML MDRD: >60 ML/MIN/1.73M2
GLUCOSE SERPL-MCNC: 94 MG/DL (ref 70–99)
HCT VFR BLD AUTO: 32.3 % (ref 40.7–50.3)
HGB BLD-MCNC: 9.9 G/DL (ref 13–17)
MCH RBC QN AUTO: 31.7 PG (ref 25.2–33.5)
MCHC RBC AUTO-ENTMCNC: 30.7 G/DL (ref 28.4–34.8)
MCV RBC AUTO: 103.5 FL (ref 82.6–102.9)
NRBC AUTOMATED: 0 PER 100 WBC
PDW BLD-RTO: 15.6 % (ref 11.8–14.4)
PLATELET # BLD AUTO: 166 K/UL (ref 138–453)
PMV BLD AUTO: 8.4 FL (ref 8.1–13.5)
POTASSIUM SERPL-SCNC: 4.2 MMOL/L (ref 3.7–5.3)
RBC # BLD: 3.12 M/UL (ref 4.21–5.77)
SODIUM SERPL-SCNC: 137 MMOL/L (ref 135–144)
WBC # BLD AUTO: 6 K/UL (ref 3.5–11.3)

## 2023-05-01 PROCEDURE — P9603 ONE-WAY ALLOW PRORATED MILES: HCPCS

## 2023-05-01 PROCEDURE — 36415 COLL VENOUS BLD VENIPUNCTURE: CPT

## 2023-05-01 PROCEDURE — 85027 COMPLETE CBC AUTOMATED: CPT

## 2023-05-01 PROCEDURE — 80048 BASIC METABOLIC PNL TOTAL CA: CPT

## 2023-05-04 ENCOUNTER — HOSPITAL ENCOUNTER (OUTPATIENT)
Age: 78
Setting detail: SPECIMEN
Discharge: HOME | End: 2023-05-04

## 2023-05-04 LAB
ANION GAP SERPL CALCULATED.3IONS-SCNC: 4 MMOL/L (ref 9–17)
BUN SERPL-MCNC: 22 MG/DL (ref 8–23)
BUN/CREAT BLD: 39 (ref 9–20)
CALCIUM SERPL-MCNC: 8.9 MG/DL (ref 8.6–10.4)
CHLORIDE SERPL-SCNC: 94 MMOL/L (ref 98–107)
CO2 SERPL-SCNC: 40 MMOL/L (ref 20–31)
CREAT SERPL-MCNC: 0.57 MG/DL (ref 0.7–1.2)
GFR SERPL CREATININE-BSD FRML MDRD: >60 ML/MIN/1.73M2
GLUCOSE SERPL-MCNC: 95 MG/DL (ref 70–99)
HCT VFR BLD AUTO: 31.3 % (ref 40.7–50.3)
HGB BLD-MCNC: 9.3 G/DL (ref 13–17)
MCH RBC QN AUTO: 32.1 PG (ref 25.2–33.5)
MCHC RBC AUTO-ENTMCNC: 29.7 G/DL (ref 28–38)
MCV RBC AUTO: 107.9 FL (ref 82.6–102.9)
PDW BLD-RTO: 15.7 % (ref 11.8–14.4)
PLATELET # BLD AUTO: 168 K/UL (ref 138–453)
PMV BLD AUTO: 8.8 FL (ref 8.1–13.5)
POTASSIUM SERPL-SCNC: 3.9 MMOL/L (ref 3.7–5.3)
RBC # BLD: 2.9 M/UL (ref 4.21–5.77)
SODIUM SERPL-SCNC: 138 MMOL/L (ref 135–144)
WBC # BLD AUTO: 3.5 K/UL (ref 3.5–11.3)

## 2023-05-04 PROCEDURE — 85027 COMPLETE CBC AUTOMATED: CPT

## 2023-05-04 PROCEDURE — P9603 ONE-WAY ALLOW PRORATED MILES: HCPCS

## 2023-05-04 PROCEDURE — 36415 COLL VENOUS BLD VENIPUNCTURE: CPT

## 2023-05-04 PROCEDURE — 80048 BASIC METABOLIC PNL TOTAL CA: CPT

## 2023-05-09 ENCOUNTER — HOSPITAL ENCOUNTER (OUTPATIENT)
Age: 78
Setting detail: SPECIMEN
Discharge: HOME | End: 2023-05-09
Payer: COMMERCIAL

## 2023-05-09 LAB
ANION GAP SERPL CALCULATED.3IONS-SCNC: 4 MMOL/L (ref 9–17)
BUN SERPL-MCNC: 23 MG/DL (ref 8–23)
BUN/CREAT BLD: 40 (ref 9–20)
CALCIUM SERPL-MCNC: 8.8 MG/DL (ref 8.6–10.4)
CHLORIDE SERPL-SCNC: 91 MMOL/L (ref 98–107)
CO2 SERPL-SCNC: 37 MMOL/L (ref 20–31)
CREAT SERPL-MCNC: 0.58 MG/DL (ref 0.7–1.2)
GFR SERPL CREATININE-BSD FRML MDRD: >60 ML/MIN/1.73M2
GLUCOSE SERPL-MCNC: 91 MG/DL (ref 70–99)
HCT VFR BLD AUTO: 29.2 % (ref 40.7–50.3)
HGB BLD-MCNC: 9 G/DL (ref 13–17)
MCH RBC QN AUTO: 32.1 PG (ref 25.2–33.5)
MCHC RBC AUTO-ENTMCNC: 30.8 G/DL (ref 28.4–34.8)
MCV RBC AUTO: 104.3 FL (ref 82.6–102.9)
NRBC AUTOMATED: 0 PER 100 WBC
PDW BLD-RTO: 16.1 % (ref 11.8–14.4)
PLATELET # BLD AUTO: 156 K/UL (ref 138–453)
PMV BLD AUTO: 8.6 FL (ref 8.1–13.5)
POTASSIUM SERPL-SCNC: 3.8 MMOL/L (ref 3.7–5.3)
RBC # BLD: 2.8 M/UL (ref 4.21–5.77)
SODIUM SERPL-SCNC: 132 MMOL/L (ref 135–144)
WBC # BLD AUTO: 3.9 K/UL (ref 3.5–11.3)

## 2023-05-09 PROCEDURE — 85027 COMPLETE CBC AUTOMATED: CPT

## 2023-05-09 PROCEDURE — 80048 BASIC METABOLIC PNL TOTAL CA: CPT

## 2023-05-09 PROCEDURE — P9603 ONE-WAY ALLOW PRORATED MILES: HCPCS

## 2023-05-09 PROCEDURE — 36415 COLL VENOUS BLD VENIPUNCTURE: CPT

## 2023-05-10 ENCOUNTER — OFFICE VISIT (OUTPATIENT)
Dept: BARIATRICS/WEIGHT MGMT | Age: 78
End: 2023-05-10
Payer: COMMERCIAL

## 2023-05-10 VITALS
SYSTOLIC BLOOD PRESSURE: 110 MMHG | HEIGHT: 70 IN | DIASTOLIC BLOOD PRESSURE: 60 MMHG | BODY MASS INDEX: 21.62 KG/M2 | WEIGHT: 151 LBS | HEART RATE: 70 BPM

## 2023-05-10 DIAGNOSIS — K90.89 OTHER SPECIFIED INTESTINAL MALABSORPTION: ICD-10-CM

## 2023-05-10 DIAGNOSIS — Z98.890 STATUS POST GASTROPLASTY: Primary | ICD-10-CM

## 2023-05-10 PROCEDURE — G8420 CALC BMI NORM PARAMETERS: HCPCS | Performed by: SURGERY

## 2023-05-10 PROCEDURE — 1123F ACP DISCUSS/DSCN MKR DOCD: CPT | Performed by: SURGERY

## 2023-05-10 PROCEDURE — 1036F TOBACCO NON-USER: CPT | Performed by: SURGERY

## 2023-05-10 PROCEDURE — 3078F DIAST BP <80 MM HG: CPT | Performed by: SURGERY

## 2023-05-10 PROCEDURE — 3074F SYST BP LT 130 MM HG: CPT | Performed by: SURGERY

## 2023-05-10 PROCEDURE — G8427 DOCREV CUR MEDS BY ELIG CLIN: HCPCS | Performed by: SURGERY

## 2023-05-10 PROCEDURE — 99213 OFFICE O/P EST LOW 20 MIN: CPT | Performed by: SURGERY

## 2023-05-10 RX ORDER — SUCRALFATE 1 G/1
1 TABLET ORAL 4 TIMES DAILY
Qty: 120 TABLET | Refills: 3 | Status: SHIPPED | OUTPATIENT
Start: 2023-05-10

## 2023-05-10 RX ORDER — OMEPRAZOLE 20 MG/1
CAPSULE, DELAYED RELEASE ORAL
COMMUNITY
Start: 2023-03-17 | End: 2023-05-10

## 2023-05-10 RX ORDER — SCOLOPAMINE TRANSDERMAL SYSTEM 1 MG/1
PATCH, EXTENDED RELEASE TRANSDERMAL
COMMUNITY
Start: 2023-03-17

## 2023-05-10 RX ORDER — THIAMINE MONONITRATE (VIT B1) 100 MG
100 TABLET ORAL DAILY
Qty: 30 TABLET | Refills: 3 | Status: SHIPPED | OUTPATIENT
Start: 2023-05-10

## 2023-05-10 RX ORDER — TAFAMIDIS 61 MG/1
CAPSULE, LIQUID FILLED ORAL
COMMUNITY
Start: 2023-04-28

## 2023-05-10 RX ORDER — PANTOPRAZOLE SODIUM 20 MG/1
20 TABLET, DELAYED RELEASE ORAL DAILY
Qty: 30 TABLET | Refills: 3 | Status: SHIPPED | OUTPATIENT
Start: 2023-05-10

## 2023-05-10 RX ORDER — BUDESONIDE AND FORMOTEROL FUMARATE DIHYDRATE 160; 4.5 UG/1; UG/1
AEROSOL RESPIRATORY (INHALATION)
COMMUNITY
Start: 2023-02-27

## 2023-05-10 RX ORDER — FOLIC ACID 1 MG/1
1 TABLET ORAL DAILY
COMMUNITY
Start: 2023-04-06

## 2023-05-10 RX ORDER — LANOLIN ALCOHOL/MO/W.PET/CERES
3 CREAM (GRAM) TOPICAL NIGHTLY
COMMUNITY
Start: 2023-02-15

## 2023-05-15 ENCOUNTER — HOSPITAL ENCOUNTER (OUTPATIENT)
Age: 78
Setting detail: SPECIMEN
Discharge: HOME | End: 2023-05-15
Payer: COMMERCIAL

## 2023-05-15 LAB
ANION GAP SERPL CALCULATED.3IONS-SCNC: 6 MMOL/L (ref 9–17)
BUN SERPL-MCNC: 17 MG/DL (ref 8–23)
BUN/CREAT BLD: 27 (ref 9–20)
CALCIUM SERPL-MCNC: 8.5 MG/DL (ref 8.6–10.4)
CHLORIDE SERPL-SCNC: 91 MMOL/L (ref 98–107)
CO2 SERPL-SCNC: 37 MMOL/L (ref 20–31)
CREAT SERPL-MCNC: 0.62 MG/DL (ref 0.7–1.2)
GFR SERPL CREATININE-BSD FRML MDRD: >60 ML/MIN/1.73M2
GLUCOSE SERPL-MCNC: 89 MG/DL (ref 70–99)
HCT VFR BLD AUTO: 26.8 % (ref 40.7–50.3)
HGB BLD-MCNC: 8.4 G/DL (ref 13–17)
MCH RBC QN AUTO: 33.3 PG (ref 25.2–33.5)
MCHC RBC AUTO-ENTMCNC: 31.3 G/DL (ref 28.4–34.8)
MCV RBC AUTO: 106.3 FL (ref 82.6–102.9)
NRBC AUTOMATED: 0 PER 100 WBC
PDW BLD-RTO: 16.6 % (ref 11.8–14.4)
PLATELET # BLD AUTO: 201 K/UL (ref 138–453)
PMV BLD AUTO: 8.7 FL (ref 8.1–13.5)
POTASSIUM SERPL-SCNC: 3.8 MMOL/L (ref 3.7–5.3)
RBC # BLD: 2.52 M/UL (ref 4.21–5.77)
SODIUM SERPL-SCNC: 134 MMOL/L (ref 135–144)
WBC # BLD AUTO: 4.3 K/UL (ref 3.5–11.3)

## 2023-05-15 PROCEDURE — P9603 ONE-WAY ALLOW PRORATED MILES: HCPCS

## 2023-05-15 PROCEDURE — 36415 COLL VENOUS BLD VENIPUNCTURE: CPT

## 2023-05-15 PROCEDURE — 80048 BASIC METABOLIC PNL TOTAL CA: CPT

## 2023-05-15 PROCEDURE — 85027 COMPLETE CBC AUTOMATED: CPT

## 2023-06-09 ENCOUNTER — HOSPITAL ENCOUNTER (OUTPATIENT)
Age: 78
Setting detail: SPECIMEN
Discharge: HOME | End: 2023-06-09

## 2023-06-22 ENCOUNTER — HOSPITAL ENCOUNTER (OUTPATIENT)
Age: 78
Setting detail: SPECIMEN
Discharge: HOME | End: 2023-06-22

## 2023-06-22 LAB
ANION GAP SERPL CALCULATED.3IONS-SCNC: 7 MMOL/L (ref 9–17)
BUN SERPL-MCNC: 12 MG/DL (ref 8–23)
BUN/CREAT SERPL: ABNORMAL (ref 9–20)
CALCIUM SERPL-MCNC: 8.6 MG/DL (ref 8.6–10.4)
CHLORIDE SERPL-SCNC: 93 MMOL/L (ref 98–107)
CO2 SERPL-SCNC: 36 MMOL/L (ref 20–31)
CREAT SERPL-MCNC: <0.4 MG/DL (ref 0.7–1.2)
ERYTHROCYTE [DISTWIDTH] IN BLOOD BY AUTOMATED COUNT: 17.1 % (ref 11.8–14.4)
GFR SERPL CREATININE-BSD FRML MDRD: ABNORMAL ML/MIN/1.73M2
GLUCOSE SERPL-MCNC: 78 MG/DL (ref 70–99)
HCT VFR BLD AUTO: 26.6 % (ref 40.7–50.3)
HGB BLD-MCNC: 8 G/DL (ref 13–17)
MCH RBC QN AUTO: 31 PG (ref 25.2–33.5)
MCHC RBC AUTO-ENTMCNC: 30.1 G/DL (ref 28.4–34.8)
MCV RBC AUTO: 103.1 FL (ref 82.6–102.9)
NRBC AUTOMATED: 0 PER 100 WBC
PLATELET # BLD AUTO: 137 K/UL (ref 138–453)
PMV BLD AUTO: 9.3 FL (ref 8.1–13.5)
POTASSIUM SERPL-SCNC: 3.6 MMOL/L (ref 3.7–5.3)
RBC # BLD AUTO: 2.58 M/UL (ref 4.21–5.77)
SODIUM SERPL-SCNC: 136 MMOL/L (ref 135–144)
WBC OTHER # BLD: 4.5 K/UL (ref 3.5–11.3)

## 2023-06-22 PROCEDURE — 85027 COMPLETE CBC AUTOMATED: CPT

## 2023-06-22 PROCEDURE — 80048 BASIC METABOLIC PNL TOTAL CA: CPT

## 2023-06-22 PROCEDURE — 36415 COLL VENOUS BLD VENIPUNCTURE: CPT

## 2023-06-22 PROCEDURE — P9603 ONE-WAY ALLOW PRORATED MILES: HCPCS

## 2023-06-27 ENCOUNTER — TELEPHONE (OUTPATIENT)
Dept: FAMILY MEDICINE CLINIC | Age: 78
End: 2023-06-27

## 2023-06-27 DIAGNOSIS — K21.9 GASTROESOPHAGEAL REFLUX DISEASE WITHOUT ESOPHAGITIS: Primary | ICD-10-CM

## 2023-06-27 DIAGNOSIS — Z87.19 HISTORY OF CALCULUS OF GALLBLADDER: ICD-10-CM

## 2023-07-05 ENCOUNTER — HOSPITAL ENCOUNTER (OUTPATIENT)
Age: 78
Setting detail: SPECIMEN
Discharge: HOME | End: 2023-07-05
Payer: COMMERCIAL

## 2023-07-05 LAB
ALBUMIN SERPL-MCNC: 2.6 G/DL (ref 3.5–5.2)
ALP SERPL-CCNC: 202 U/L (ref 40–129)
ALT SERPL-CCNC: 7 U/L (ref 5–41)
ANION GAP SERPL CALCULATED.3IONS-SCNC: 9 MMOL/L (ref 9–17)
AST SERPL-CCNC: 13 U/L
BILIRUB SERPL-MCNC: 0.9 MG/DL (ref 0.3–1.2)
BNP SERPL-MCNC: ABNORMAL PG/ML
BUN SERPL-MCNC: 14 MG/DL (ref 8–23)
BUN/CREAT SERPL: ABNORMAL (ref 9–20)
CALCIUM SERPL-MCNC: 8.5 MG/DL (ref 8.6–10.4)
CHLORIDE SERPL-SCNC: 88 MMOL/L (ref 98–107)
CHOLEST SERPL-MCNC: 89 MG/DL
CHOLESTEROL/HDL RATIO: 1.9
CO2 SERPL-SCNC: 35 MMOL/L (ref 20–31)
CREAT SERPL-MCNC: <0.4 MG/DL (ref 0.7–1.2)
ERYTHROCYTE [DISTWIDTH] IN BLOOD BY AUTOMATED COUNT: 16.7 % (ref 11.8–14.4)
GFR SERPL CREATININE-BSD FRML MDRD: ABNORMAL ML/MIN/1.73M2
GLUCOSE SERPL-MCNC: 78 MG/DL (ref 70–99)
HCT VFR BLD AUTO: 23.1 % (ref 40.7–50.3)
HDLC SERPL-MCNC: 47 MG/DL
HGB BLD-MCNC: 7.2 G/DL (ref 13–17)
LDLC SERPL CALC-MCNC: 33 MG/DL (ref 0–130)
MCH RBC QN AUTO: 31.3 PG (ref 25.2–33.5)
MCHC RBC AUTO-ENTMCNC: 31.2 G/DL (ref 28.4–34.8)
MCV RBC AUTO: 100.4 FL (ref 82.6–102.9)
NRBC BLD-RTO: 0 PER 100 WBC
PLATELET # BLD AUTO: 170 K/UL (ref 138–453)
PMV BLD AUTO: 8.6 FL (ref 8.1–13.5)
POTASSIUM SERPL-SCNC: 3.6 MMOL/L (ref 3.7–5.3)
PROT SERPL-MCNC: 5.5 G/DL (ref 6.4–8.3)
RBC # BLD AUTO: 2.3 M/UL (ref 4.21–5.77)
SODIUM SERPL-SCNC: 132 MMOL/L (ref 135–144)
TRIGL SERPL-MCNC: 43 MG/DL
WBC OTHER # BLD: 4.6 K/UL (ref 3.5–11.3)

## 2023-07-05 PROCEDURE — 85027 COMPLETE CBC AUTOMATED: CPT

## 2023-07-05 PROCEDURE — 80061 LIPID PANEL: CPT

## 2023-07-05 PROCEDURE — 83880 ASSAY OF NATRIURETIC PEPTIDE: CPT

## 2023-07-05 PROCEDURE — 80053 COMPREHEN METABOLIC PANEL: CPT

## 2023-07-05 PROCEDURE — 36415 COLL VENOUS BLD VENIPUNCTURE: CPT

## 2023-07-05 PROCEDURE — P9603 ONE-WAY ALLOW PRORATED MILES: HCPCS

## 2023-07-06 ENCOUNTER — HOSPITAL ENCOUNTER (OUTPATIENT)
Age: 78
Setting detail: SPECIMEN
Discharge: HOME | End: 2023-07-06

## 2023-07-06 LAB
HCT VFR BLD AUTO: 23.3 % (ref 40.7–50.3)
HGB BLD-MCNC: 7.5 G/DL (ref 13–17)

## 2023-07-06 PROCEDURE — 36415 COLL VENOUS BLD VENIPUNCTURE: CPT

## 2023-07-06 PROCEDURE — 85014 HEMATOCRIT: CPT

## 2023-07-06 PROCEDURE — 85018 HEMOGLOBIN: CPT

## 2023-07-06 PROCEDURE — P9603 ONE-WAY ALLOW PRORATED MILES: HCPCS

## 2023-07-07 ENCOUNTER — HOSPITAL ENCOUNTER (OUTPATIENT)
Age: 78
Setting detail: SPECIMEN
Discharge: HOME | End: 2023-07-07

## 2023-07-07 LAB
ANION GAP SERPL CALCULATED.3IONS-SCNC: 5 MMOL/L (ref 9–17)
BUN SERPL-MCNC: 14 MG/DL (ref 8–23)
BUN/CREAT SERPL: ABNORMAL (ref 9–20)
CALCIUM SERPL-MCNC: 8.5 MG/DL (ref 8.6–10.4)
CHLORIDE SERPL-SCNC: 89 MMOL/L (ref 98–107)
CO2 SERPL-SCNC: 38 MMOL/L (ref 20–31)
CREAT SERPL-MCNC: <0.4 MG/DL (ref 0.7–1.2)
ERYTHROCYTE [DISTWIDTH] IN BLOOD BY AUTOMATED COUNT: 16.2 % (ref 11.8–14.4)
GFR SERPL CREATININE-BSD FRML MDRD: ABNORMAL ML/MIN/1.73M2
GLUCOSE SERPL-MCNC: 160 MG/DL (ref 70–99)
HCT VFR BLD AUTO: 26.6 % (ref 40.7–50.3)
HGB BLD-MCNC: 8 G/DL (ref 13–17)
MCH RBC QN AUTO: 30.4 PG (ref 25.2–33.5)
MCHC RBC AUTO-ENTMCNC: 30.1 G/DL (ref 28.4–34.8)
MCV RBC AUTO: 101.1 FL (ref 82.6–102.9)
NRBC BLD-RTO: 0 PER 100 WBC
PLATELET # BLD AUTO: 199 K/UL (ref 138–453)
PMV BLD AUTO: 8.7 FL (ref 8.1–13.5)
POTASSIUM SERPL-SCNC: 4 MMOL/L (ref 3.7–5.3)
RBC # BLD AUTO: 2.63 M/UL (ref 4.21–5.77)
SODIUM SERPL-SCNC: 132 MMOL/L (ref 135–144)
WBC OTHER # BLD: 6.1 K/UL (ref 3.5–11.3)

## 2023-07-07 PROCEDURE — 85027 COMPLETE CBC AUTOMATED: CPT

## 2023-07-07 PROCEDURE — 36415 COLL VENOUS BLD VENIPUNCTURE: CPT

## 2023-07-07 PROCEDURE — P9603 ONE-WAY ALLOW PRORATED MILES: HCPCS

## 2023-07-07 PROCEDURE — 80048 BASIC METABOLIC PNL TOTAL CA: CPT

## 2023-07-11 ENCOUNTER — HOSPITAL ENCOUNTER (OUTPATIENT)
Age: 78
Setting detail: SPECIMEN
Discharge: HOME | End: 2023-07-11
Payer: COMMERCIAL

## 2023-07-11 LAB
ANION GAP SERPL CALCULATED.3IONS-SCNC: 4 MMOL/L (ref 9–17)
BUN SERPL-MCNC: 22 MG/DL (ref 8–23)
BUN/CREAT SERPL: 55 (ref 9–20)
CALCIUM SERPL-MCNC: 8.5 MG/DL (ref 8.6–10.4)
CHLORIDE SERPL-SCNC: 89 MMOL/L (ref 98–107)
CO2 SERPL-SCNC: 38 MMOL/L (ref 20–31)
CREAT SERPL-MCNC: 0.4 MG/DL (ref 0.7–1.2)
ERYTHROCYTE [DISTWIDTH] IN BLOOD BY AUTOMATED COUNT: 16.4 % (ref 11.8–14.4)
GFR SERPL CREATININE-BSD FRML MDRD: >60 ML/MIN/1.73M2
GLUCOSE SERPL-MCNC: 96 MG/DL (ref 70–99)
HCT VFR BLD AUTO: 24.3 % (ref 40.7–50.3)
HGB BLD-MCNC: 7.4 G/DL (ref 13–17)
MCH RBC QN AUTO: 30.2 PG (ref 25.2–33.5)
MCHC RBC AUTO-ENTMCNC: 30.5 G/DL (ref 28.4–34.8)
MCV RBC AUTO: 99.2 FL (ref 82.6–102.9)
NRBC BLD-RTO: 0 PER 100 WBC
PLATELET # BLD AUTO: 249 K/UL (ref 138–453)
PMV BLD AUTO: 8.8 FL (ref 8.1–13.5)
POTASSIUM SERPL-SCNC: 4.2 MMOL/L (ref 3.7–5.3)
RBC # BLD AUTO: 2.45 M/UL (ref 4.21–5.77)
SODIUM SERPL-SCNC: 131 MMOL/L (ref 135–144)
WBC OTHER # BLD: 7.3 K/UL (ref 3.5–11.3)

## 2023-07-11 PROCEDURE — 80048 BASIC METABOLIC PNL TOTAL CA: CPT

## 2023-07-11 PROCEDURE — 85027 COMPLETE CBC AUTOMATED: CPT

## 2023-07-11 PROCEDURE — 36415 COLL VENOUS BLD VENIPUNCTURE: CPT

## 2023-07-11 PROCEDURE — P9603 ONE-WAY ALLOW PRORATED MILES: HCPCS

## 2023-07-14 ENCOUNTER — HOSPITAL ENCOUNTER (OUTPATIENT)
Age: 78
Setting detail: SPECIMEN
Discharge: HOME | End: 2023-07-14

## 2023-07-14 LAB
HCT VFR BLD AUTO: 27.8 % (ref 40.7–50.3)
HGB BLD-MCNC: 8 G/DL (ref 13–17)

## 2023-07-14 PROCEDURE — P9603 ONE-WAY ALLOW PRORATED MILES: HCPCS

## 2023-07-14 PROCEDURE — 36415 COLL VENOUS BLD VENIPUNCTURE: CPT

## 2023-07-14 PROCEDURE — 85014 HEMATOCRIT: CPT

## 2023-07-14 PROCEDURE — 85018 HEMOGLOBIN: CPT

## 2023-07-17 ENCOUNTER — HOSPITAL ENCOUNTER (OUTPATIENT)
Age: 78
Setting detail: SPECIMEN
Discharge: HOME | End: 2023-07-17
Payer: COMMERCIAL

## 2023-07-17 LAB
BNP SERPL-MCNC: ABNORMAL PG/ML
HCT VFR BLD AUTO: 27.9 % (ref 40.7–50.3)
HGB BLD-MCNC: 8.2 G/DL (ref 13–17)

## 2023-07-17 PROCEDURE — 83880 ASSAY OF NATRIURETIC PEPTIDE: CPT

## 2023-07-17 PROCEDURE — P9603 ONE-WAY ALLOW PRORATED MILES: HCPCS

## 2023-07-17 PROCEDURE — 85014 HEMATOCRIT: CPT

## 2023-07-17 PROCEDURE — 36415 COLL VENOUS BLD VENIPUNCTURE: CPT

## 2023-07-17 PROCEDURE — 85018 HEMOGLOBIN: CPT

## 2023-07-18 ENCOUNTER — HOSPITAL ENCOUNTER (OUTPATIENT)
Age: 78
Setting detail: SPECIMEN
Discharge: HOME | End: 2023-07-18
Payer: COMMERCIAL

## 2023-07-18 LAB
ANION GAP SERPL CALCULATED.3IONS-SCNC: 5 MMOL/L (ref 9–17)
BUN SERPL-MCNC: 18 MG/DL (ref 8–23)
BUN/CREAT SERPL: ABNORMAL (ref 9–20)
CALCIUM SERPL-MCNC: 8.6 MG/DL (ref 8.6–10.4)
CHLORIDE SERPL-SCNC: 87 MMOL/L (ref 98–107)
CO2 SERPL-SCNC: 37 MMOL/L (ref 20–31)
CREAT SERPL-MCNC: <0.4 MG/DL (ref 0.7–1.2)
ERYTHROCYTE [DISTWIDTH] IN BLOOD BY AUTOMATED COUNT: 16.7 % (ref 11.8–14.4)
GFR SERPL CREATININE-BSD FRML MDRD: ABNORMAL ML/MIN/1.73M2
GLUCOSE SERPL-MCNC: 106 MG/DL (ref 70–99)
HCT VFR BLD AUTO: 29.6 % (ref 40.7–50.3)
HGB BLD-MCNC: 8.6 G/DL (ref 13–17)
MCH RBC QN AUTO: 29.8 PG (ref 25.2–33.5)
MCHC RBC AUTO-ENTMCNC: 29.1 G/DL (ref 28.4–34.8)
MCV RBC AUTO: 102.4 FL (ref 82.6–102.9)
NRBC BLD-RTO: 0 PER 100 WBC
PLATELET # BLD AUTO: 267 K/UL (ref 138–453)
PMV BLD AUTO: 8.4 FL (ref 8.1–13.5)
POTASSIUM SERPL-SCNC: 4.6 MMOL/L (ref 3.7–5.3)
RBC # BLD AUTO: 2.89 M/UL (ref 4.21–5.77)
SODIUM SERPL-SCNC: 129 MMOL/L (ref 135–144)
WBC OTHER # BLD: 7.9 K/UL (ref 3.5–11.3)

## 2023-07-18 PROCEDURE — 85027 COMPLETE CBC AUTOMATED: CPT

## 2023-07-18 PROCEDURE — P9603 ONE-WAY ALLOW PRORATED MILES: HCPCS

## 2023-07-18 PROCEDURE — 80048 BASIC METABOLIC PNL TOTAL CA: CPT

## 2023-07-18 PROCEDURE — 36415 COLL VENOUS BLD VENIPUNCTURE: CPT

## 2023-07-20 ENCOUNTER — HOSPITAL ENCOUNTER (OUTPATIENT)
Age: 78
Setting detail: SPECIMEN
Discharge: HOME | End: 2023-07-20
Payer: COMMERCIAL

## 2023-07-20 LAB
ANION GAP SERPL CALCULATED.3IONS-SCNC: 7 MMOL/L (ref 9–17)
BUN SERPL-MCNC: 20 MG/DL (ref 8–23)
BUN/CREAT SERPL: ABNORMAL (ref 9–20)
CALCIUM SERPL-MCNC: 8.6 MG/DL (ref 8.6–10.4)
CHLORIDE SERPL-SCNC: 86 MMOL/L (ref 98–107)
CO2 SERPL-SCNC: 36 MMOL/L (ref 20–31)
CREAT SERPL-MCNC: <0.4 MG/DL (ref 0.7–1.2)
ERYTHROCYTE [DISTWIDTH] IN BLOOD BY AUTOMATED COUNT: 16.5 % (ref 11.8–14.4)
GFR SERPL CREATININE-BSD FRML MDRD: ABNORMAL ML/MIN/1.73M2
GLUCOSE SERPL-MCNC: 100 MG/DL (ref 70–99)
HCT VFR BLD AUTO: 28.5 % (ref 40.7–50.3)
HGB BLD-MCNC: 8.3 G/DL (ref 13–17)
MCH RBC QN AUTO: 29.5 PG (ref 25.2–33.5)
MCHC RBC AUTO-ENTMCNC: 29.1 G/DL (ref 28.4–34.8)
MCV RBC AUTO: 101.4 FL (ref 82.6–102.9)
NRBC BLD-RTO: 0 PER 100 WBC
OSMOLALITY SERPL: 284 MOSM/KG (ref 275–295)
PLATELET # BLD AUTO: 239 K/UL (ref 138–453)
PMV BLD AUTO: 8.7 FL (ref 8.1–13.5)
POTASSIUM SERPL-SCNC: 4.8 MMOL/L (ref 3.7–5.3)
RBC # BLD AUTO: 2.81 M/UL (ref 4.21–5.77)
SODIUM SERPL-SCNC: 129 MMOL/L (ref 135–144)
WBC OTHER # BLD: 8.8 K/UL (ref 3.5–11.3)

## 2023-07-20 PROCEDURE — 85027 COMPLETE CBC AUTOMATED: CPT

## 2023-07-20 PROCEDURE — 36415 COLL VENOUS BLD VENIPUNCTURE: CPT

## 2023-07-20 PROCEDURE — 80048 BASIC METABOLIC PNL TOTAL CA: CPT

## 2023-07-20 PROCEDURE — P9603 ONE-WAY ALLOW PRORATED MILES: HCPCS

## 2023-07-20 PROCEDURE — 83930 ASSAY OF BLOOD OSMOLALITY: CPT

## 2023-07-26 ENCOUNTER — HOSPITAL ENCOUNTER (OUTPATIENT)
Age: 78
Setting detail: SPECIMEN
Discharge: HOME | End: 2023-07-26

## 2023-07-26 LAB
ALBUMIN SERPL-MCNC: 2.2 G/DL (ref 3.5–5.2)
ALP SERPL-CCNC: 189 U/L (ref 40–129)
ALT SERPL-CCNC: 6 U/L (ref 5–41)
ANION GAP SERPL CALCULATED.3IONS-SCNC: 4 MMOL/L (ref 9–17)
AST SERPL-CCNC: 14 U/L
BASOPHILS # BLD: 0.05 K/UL (ref 0–0.2)
BASOPHILS NFR BLD: 1 % (ref 0–2)
BILIRUB SERPL-MCNC: 0.7 MG/DL (ref 0.3–1.2)
BUN SERPL-MCNC: 17 MG/DL (ref 8–23)
BUN/CREAT SERPL: ABNORMAL (ref 9–20)
CALCIUM SERPL-MCNC: 8.2 MG/DL (ref 8.6–10.4)
CHLORIDE SERPL-SCNC: 90 MMOL/L (ref 98–107)
CO2 SERPL-SCNC: 39 MMOL/L (ref 20–31)
CREAT SERPL-MCNC: <0.4 MG/DL (ref 0.7–1.2)
EOSINOPHIL # BLD: 0.13 K/UL (ref 0–0.44)
EOSINOPHILS RELATIVE PERCENT: 2 % (ref 1–4)
ERYTHROCYTE [DISTWIDTH] IN BLOOD BY AUTOMATED COUNT: 17.1 % (ref 11.8–14.4)
GFR SERPL CREATININE-BSD FRML MDRD: ABNORMAL ML/MIN/1.73M2
GLUCOSE SERPL-MCNC: 74 MG/DL (ref 70–99)
HCT VFR BLD AUTO: 28.6 % (ref 40.7–50.3)
HGB BLD-MCNC: 8.5 G/DL (ref 13–17)
IMM GRANULOCYTES # BLD AUTO: 0.03 K/UL (ref 0–0.3)
IMM GRANULOCYTES NFR BLD: 0 %
LYMPHOCYTES NFR BLD: 0.82 K/UL (ref 1.1–3.7)
LYMPHOCYTES RELATIVE PERCENT: 11 % (ref 24–43)
MCH RBC QN AUTO: 29.9 PG (ref 25.2–33.5)
MCHC RBC AUTO-ENTMCNC: 29.7 G/DL (ref 28.4–34.8)
MCV RBC AUTO: 100.7 FL (ref 82.6–102.9)
MONOCYTES NFR BLD: 0.99 K/UL (ref 0.1–1.2)
MONOCYTES NFR BLD: 13 % (ref 3–12)
NEUTROPHILS NFR BLD: 73 % (ref 36–65)
NEUTS SEG NFR BLD: 5.67 K/UL (ref 1.5–8.1)
NRBC BLD-RTO: 0 PER 100 WBC
PLATELET # BLD AUTO: 247 K/UL (ref 138–453)
PMV BLD AUTO: 8.5 FL (ref 8.1–13.5)
POTASSIUM SERPL-SCNC: 4 MMOL/L (ref 3.7–5.3)
PROT SERPL-MCNC: 5.5 G/DL (ref 6.4–8.3)
RBC # BLD AUTO: 2.84 M/UL (ref 4.21–5.77)
RBC # BLD: ABNORMAL 10*6/UL
SODIUM SERPL-SCNC: 133 MMOL/L (ref 135–144)
WBC OTHER # BLD: 7.7 K/UL (ref 3.5–11.3)

## 2023-07-26 PROCEDURE — P9603 ONE-WAY ALLOW PRORATED MILES: HCPCS

## 2023-07-26 PROCEDURE — 36415 COLL VENOUS BLD VENIPUNCTURE: CPT

## 2023-07-26 PROCEDURE — 85027 COMPLETE CBC AUTOMATED: CPT

## 2023-07-26 PROCEDURE — 80053 COMPREHEN METABOLIC PANEL: CPT

## 2023-07-28 ENCOUNTER — HOSPITAL ENCOUNTER (OUTPATIENT)
Age: 78
Setting detail: SPECIMEN
Discharge: HOME | End: 2023-07-28

## 2023-09-25 ENCOUNTER — TELEPHONE (OUTPATIENT)
Dept: PRIMARY CARE CLINIC | Age: 78
End: 2023-09-25

## 2023-09-25 NOTE — TELEPHONE ENCOUNTER
Spoke with patients daughter amd she informed office that patient has passed away 9/23/23 .  PCP notified

## 2023-10-05 NOTE — PROGRESS NOTES
Physical Therapy  Facility/Department: [address] STEPDOWN  Daily Treatment Note  NAME: Aamir Garcia  : 1945  MRN: [id number]    Date of Service: 8/15/2018    Discharge Recommendations:  Outpatient PT, Home with assist PRN   PT Equipment Recommendations  Equipment Needed: No    Patient Diagnosis(es): The encounter diagnosis was Hematemesis with nausea. has a past medical history of Asthma; Atrial fibrillation ([address] GERD (gastroesophageal reflux disease); Hyperlipidemia; Hypertension; Obesity; Osteoarthritis; and Unspecified sleep apnea. has a past surgical history that includes Finger amputation (); Gastric bypass surgery (); Carpal tunnel release; Colonoscopy; Rotator cuff repair; joint replacement ( & ); Hemorrhoid surgery; pr egd transoral biopsy single/multiple (N/A, 2018); Upper gastrointestinal endoscopy (2018); Upper gastrointestinal endoscopy (N/A, 2018); and Upper gastrointestinal endoscopy (N/A, 2018). Restrictions  Restrictions/Precautions  Restrictions/Precautions: Fall Risk  Required Braces or Orthoses?: Yes (Pt has an ankle foot orthotic in his shoe that he needs for ambulation)  Position Activity Restriction  Other position/activity restrictions: Up as tolerated  Subjective   General  Chart Reviewed: Yes  Response To Previous Treatment: Patient with no complaints from previous session. Family / Caregiver Present: Yes (family arrived a the start of session)  Subjective  Subjective: RN and pt agreed to PT, pt awake in bed upon arrival. Pt reports pain in abdomen and rib cage reportedly from \"continuous vomitting\", pleasant and cooperative with PT.   Pain Screening  Patient Currently in Pain: Yes (when coughing and with mobility)  Pain Assessment  Pain Assessment: 0-10  Pain Level: 6  Pain Type: Acute pain  Pain Location: Abdomen;Rib cage  Pain Orientation: Mid  Pain Descriptors: Discomfort  Pain Frequency: Intermittent  Pain Onset: On-going  Clinical No

## 2024-10-11 NOTE — RESULT ENCOUNTER NOTE
October 11, 2024       Tomy Lopez MD  [address]  [facility] [address]  Via In Basket      Patient: Vic Light   YOB: 1945   Date of Visit: 10/11/2024       Dear Dr. Lopez:    I saw your patient, Keagan Light, for an evaluation. Below are my notes for this visit with him.    If you have questions, please do not hesitate to call me.          Sincerely,        Lenka Moon MD        CC: No Recipients    Lenka Moon MD  10/11/2024  9:30 AM  Signed  Assessment and Plan:  Vic Light returns to clinic for follow up of Type 2 Diabetes.  The most recent HbA1c was at goal.  We have reviewed his blood glucose control.  I have advised that given his excellent control, we could continue his current glimepiride.  However, as he had an episode of hypoglycemia to the 50's, we could also lower his dose and see if blood glucose remains well controlled without hypoglycemia.  He would like to try a lower dose.      1. Type 2 diabetes mellitus with stage 4 chronic kidney disease, without long-term current use of insulin  (CMD)  - I have decreased glimepiride to 1 mg PO daily  - if blood glucose is rising above 150 mg/dl, he will call to increase his dose again  - continue working on diet and exercise  - continue checking blood glucose once daily  - HbA1c, BMP in 3 months  - return to clinic in 3 months for follow up    - Glycohemoglobin; Future  - glimepiride (AMARYL) 1 MG tablet; Take 1 tablet by mouth daily (before breakfast).  Dispense: 90 tablet; Refill: 3  - Basic Metabolic Panel; Future    2. Essential hypertension, benign  - treated with an ACE-I, managed by PCP    3. Pure hypercholesterolemia  - treated with a statin, managed by PCP      Chief Complaint   Patient presents with   • Diabetes Mellitus   • Office Visit     Follow up - Type 2 diabetes mellitus with stage 4 chronic kidney disease, without long-term current use of insulin        HPI: Vic GILBERT  Patient urine culture was positive for 2 bacteria causing UTI, urine culture showed resistant to oral medication, PCP recommends coming into the office for antibiotic injection, patient can be scheduled for MA visit Kuldeep is a 78 year old male who returns to clinic for follow up of Type 2 Diabetes.    Vic is accompanied by: No one    Duration:  30+ years  Most recent HbA1c: 6.1% 9/10/2024   Current DM regimen:   - glimepiride 2 mg PO daily     Previous treatment:   Jardiance, discontinued due to intolerance, with rising blood glucose and pruritus to the bilateral lower extremities   Glyburide 2.5 mg   Glyburide 5 mg  Glimepiride 4 mg  Glipizide 5 mg  Metformin  - 750 mg twice daily  Glucophage  mg  Actos  Avandamet  Avandia 2 mg  Januvia 100 mg - caused elevated blood glucose  Invokana - not tolerated, but he doesn't remember what specifically happened    Preventive Care:   Known diabetic complications: Microalbuminuria and CKD , retinopathy s/p eye injections   Most recent eye exam: 3/2024   Most recent foot exam: 6/2023   Hypoglycemic unawareness: No   Recent severe hypoglycemia requiring assistance: No   History of pancreatitis:  No  History of thyroid cancer:  No  Recurrent UTI/yeast infections:  No  Statin: Yes   ACE/ARB: Yes  Aspirin: No    Lifestyle Modification:   Exercise: exercises at the senior center in [place], active in the yard  Balanced carb-controlled diet: Yes, eats a lot of veggies and fruit, doesn't eat a lot of sweets  Counseled on smoking and/or alcohol: n/a  Self-foot examinations at home: Yes    Review of recent SMBG reveals blood glucose is checked once daily, in the morning.  Blood glucose has ranged from 72 - 118 since his last appointment.  On discussion, he reports last week he got a call from [facility] in [place], [place], and they had prescriptions for him.  He no longer lives anywhere near there, and wonders why his prescriptions were sent there.  He is able to tell when his blood glucose is low.  He has only felt hypoglycemic on one occasion, when his blood glucose was low in the lab.  He would be interested in trying a lower dose.      Current Medications:  Current Outpatient  Medications   Medication Sig Dispense Refill   • glimepiride (AMARYL) 1 MG tablet Take 1 tablet by mouth daily (before breakfast). 90 tablet 3   • atorvastatin (LIPITOR) 10 MG tablet TAKE 1 TABLET BY MOUTH EVERY NIGHT 90 tablet 1   • diclofenac (VOLTAREN) 1 % gel Apply 1 g topically 4 times daily. 100 g 3   • apixaBAN (ELIQUIS) 5 MG Tab Take 1 tablet by mouth every 12 hours. 180 tablet 1   • hydroCHLOROthiazide 12.5 MG tablet Take 1 tablet by mouth daily. 90 tablet 1   • amLODIPine (NORVASC) 5 MG tablet Take 1 tablet by mouth daily. 90 tablet 3   • lisinopril (ZESTRIL) 20 MG tablet Take 1 tablet by mouth in the morning and 1 tablet in the evening. 180 tablet 3   • Lancets (OneTouch Delica Plus [id number]) Misc TEST ONCE DAILY 100 each 3   • blood glucose (OneTouch Ultra) test strip TEST ONCE DAILY 100 strip 3   • MAGNESIUM CITRATE PO Take 250 mg by mouth in the morning and 250 mg in the evening. 2 tablets in the AM and 1 tablet in the PM     • Multiple Vitamins-Minerals (MULTIVITAMIN ADULTS 50+ PO) Take 1 tablet by mouth daily.     • CHOLECALCIFEROL PO Take 2,000 Units by mouth daily.     • DISPENSE Please supply patient with One Touch Ultra 2 lancets. 100 each 11     No current facility-administered medications for this visit.     ALLERGIES:   Allergen Reactions   • Oxycodone Nausea & Vomiting   • Vicodin [Hydrocodone-Acetaminophen] Nausea & Vomiting   • Allopurinol Other (See Comments)     Blood sugar increase dramatically   • Hydromet Other (See Comments)     drowsy   • Hydromorphone CONSTIPATION     Extreme constipation    • Jardiance [Empagliflozin] Other (See Comments)   • Losartan Other (See Comments)     Nasal Passage Swelling, Breathing Problems, Excessive Sweating, Nausea   • Sglt2 Inhibitors PRURITUS     With associated headaches.  Has tried jardiance and invokana.    • Tramadol Other (See Comments)     Keeps him up at night , cant sleep     Review of Systems: Pertinent positives included in HPI. All  others as documented by my MA. I have reviewed these with the patient and agree with the content.    Physical Exam:  Vic is healthy appearing, and in no apparent distress  Visit Vitals  /60 (BP Location: LUE - Left upper extremity, Patient Position: Sitting, Cuff Size: Large Adult)   Pulse (!) 52   Wt 94.3 kg (208 lb)   BMI 29.84 kg/m²     Wt Readings from Last 4 Encounters:   10/11/24 94.3 kg (208 lb)   08/26/24 95.7 kg (211 lb)   07/12/24 95.8 kg (211 lb 1.6 oz)   07/11/24 95.9 kg (211 lb 6.4 oz)     Physical Exam  Vitals reviewed.   Constitutional:       General: He is not in acute distress.     Appearance: Normal appearance.   Pulmonary:      Effort: Pulmonary effort is normal. No respiratory distress.   Neurological:      Mental Status: He is alert. Mental status is at baseline.   Psychiatric:         Mood and Affect: Mood normal.         Behavior: Behavior normal.       Diabetic Foot Exam Documentation   Abnormal Bilateral Foot Exam:  Right: Skin integrity is normal - no erythema, blisters, callosities, or ulcers . Dorsalis pedis and posterial tibial pulses are present. Pressure sensation using the [place]-Chavo is present, other than in a small area of the medial ball of the foot.    Left: Skin integrity is normal - no erythema, blisters, callosities, or ulcers . Dorsalis pedis and posterial tibial pulses are present. Pressure sensation using the Lomita-Chavo is present.       Diagnostic Studies:    Component      Latest Ref Rn 9/10/2024  9:14 AM   Sodium      135 - 145 mmol/L 133 (L)    Potassium      3.4 - 5.1 mmol/L 4.5    Chloride      97 - 110 mmol/L 100    CO2      21 - 32 mmol/L 24    ANION GAP      7 - 19 mmol/L 14    CALCIUM      8.4 - 10.2 mg/dL 9.1    PHOSPHORUS      2.4 - 4.7 mg/dL 3.4    Albumin      3.6 - 5.1 g/dL 3.9    Glucose      70 - 99 mg/dL 77    BUN      6 - 20 mg/dL 53 (H)    Creatinine      0.67 - 1.17 mg/dL 2.27 (H)    Glomerular Filtration Rate      >=60  29 (L)     BUN/CREATININE RATIO      7 - 25  23    GLYCOHEMOGLOBIN A1C      4.5 - 5.6 % 6.1 (H)       Legend:  (L) Low  (H) High         I spent a total of 40 minutes on the day of the visit.  This includes pre-charting, chart review, and documenting.

## (undated) DEVICE — YANKAUER,FLEXIBLE HANDLE,REGLR CAPACITY: Brand: MEDLINE INDUSTRIES, INC.

## (undated) DEVICE — DRAPE,REIN 53X77,STERILE: Brand: MEDLINE

## (undated) DEVICE — GOWN,SIRUS,NONRNF,SETINSLV,XL,20/CS: Brand: MEDLINE

## (undated) DEVICE — Device

## (undated) DEVICE — SYRINGE IRRIG 60ML SFT PLIABLE BLB EZ TO GRP 1 HND USE W/

## (undated) DEVICE — BANDAGE COBAN 6 IN WND 6INX5YD FOAM

## (undated) DEVICE — THIN OFFSET (9.0 X 0.38 X 25.0MM)

## (undated) DEVICE — TOWEL,OR,DSP,ST,BLUE,STD,4/PK,20PK/CS: Brand: MEDLINE

## (undated) DEVICE — ESOPHAGEAL/PYLORIC/COLONIC/BILIARY WIREGUIDED BALLOON DILATATION CATHETER: Brand: CRE™ PRO

## (undated) DEVICE — FORCEPS BX L240CM JAW DIA22MM ORNG STD CAP W NDL RAD JAW 4

## (undated) DEVICE — JELLY,LUBE,STERILE,FLIP TOP,TUBE,2-OZ: Brand: MEDLINE

## (undated) DEVICE — PROTECTOR ULN NRV PUR FOAM HK LOOP STRP ANATOMICALLY

## (undated) DEVICE — CUP MED 1OZ CLR POLYPR FEED GRAD W/O LID

## (undated) DEVICE — 3M™ IOBAN™ 2 ANTIMICROBIAL INCISE DRAPE 6650EZ: Brand: IOBAN™ 2

## (undated) DEVICE — GLOVE EXAM SM L95IN FNGR THK35MIL PALM THK24MIL OFF WHT

## (undated) DEVICE — TUBING, SUCTION, 9/32" X 20', STRAIGHT: Brand: MEDLINE INDUSTRIES, INC.

## (undated) DEVICE — FORCEPS GRSP 1.8MMX240CM CATH 15MM APERATURE 4 PRNG FB

## (undated) DEVICE — ADAPTER TBNG LUER STUB 15 GA INTMED

## (undated) DEVICE — NET RETRV STD FOREIGN BODY ROTH PED

## (undated) DEVICE — BASIN EMSIS 700ML GRAPHITE PLAS KID SHP GRAD

## (undated) DEVICE — MEDICINE CUP, GRADUATED, STER: Brand: MEDLINE

## (undated) DEVICE — GLOVE ORANGE PI 7 1/2   MSG9075

## (undated) DEVICE — Z DISCONTINUED USE 2272124 DRAPE SURG XL N INVASIVE 2 LAYR DISP

## (undated) DEVICE — NEEDLE SCLERO L4MM L200CM OD25GA ODSEC18MM 038IN LOK CLR HUB

## (undated) DEVICE — TUBING, SUCTION, 1/4" X 12', STRAIGHT: Brand: MEDLINE

## (undated) DEVICE — GLOVE SURG SZ 65 THK91MIL LTX FREE SYN POLYISOPRENE

## (undated) DEVICE — GLOVE SURG SZ 65 L12IN FNGR THK87MIL WHT LTX FREE

## (undated) DEVICE — FORCEPS BX L240CM WRK CHN 2.8MM STD CAP W/ NDL MIC MESH

## (undated) DEVICE — GLOVE ORANGE PI 8   MSG9080

## (undated) DEVICE — MARKER,SKIN,WI/RULER AND LABELS: Brand: MEDLINE

## (undated) DEVICE — SINGLE-USE BIOPSY FORCEPS: Brand: RADIAL JAW 4

## (undated) DEVICE — DRESSING,GAUZE,XEROFORM,CURAD,1"X8",ST: Brand: CURAD

## (undated) DEVICE — Device: Brand: DEFENDO VALVE AND CONNECTOR KIT

## (undated) DEVICE — 6619 2 PTNT ISO SYS INCISE AREA&LT;(&GT;&&LT;)&GT;P: Brand: STERI-DRAPE™ IOBAN™ 2

## (undated) DEVICE — C-ARMOR C-ARM EQUIPMENT COVERS CLEAR STERILE UNIVERSAL FIT 12 PER CASE: Brand: C-ARMOR

## (undated) DEVICE — SYRINGE INFL 60ML DISP ALLIANCE II

## (undated) DEVICE — DRESSING TRNSPAR W5XL4.5IN FLM SHT SEMIPERMEABLE WIND

## (undated) DEVICE — ZIPPERED TOGA, 2X LARGE: Brand: FLYTE

## (undated) DEVICE — GUIDEWIRE ORTH L400MM DIA3.2MM FOR TFN

## (undated) DEVICE — Device: Brand: SPOT EX ENDOSCOPIC TATTOO

## (undated) DEVICE — GOWN,AURORA,NONREINFORCED,LARGE: Brand: MEDLINE

## (undated) DEVICE — GARMENT,MEDLINE,DVT,INT,CALF,MED, GEN2: Brand: MEDLINE

## (undated) DEVICE — GLOVE ORANGE PI 7   MSG9070

## (undated) DEVICE — OSCILLATING TIP SAW CARTRIDGE: Brand: PRECISION FALCON

## (undated) DEVICE — ZIPPERED TOGA, X-LARGE: Brand: FLYTE

## (undated) DEVICE — SYRINGE MED 50ML LUERLOCK TIP

## (undated) DEVICE — COVER LT HNDL BLU PLAS

## (undated) DEVICE — GLOVE SURG 7 11.7IN BEAD CUF LIGHT BRN SENSICARE LTX FREE

## (undated) DEVICE — SVMMC ORTH SPL DRP PK

## (undated) DEVICE — ADHESIVE SKIN CLOSURE TOP 36 CC HI VISC DERMBND MINI

## (undated) DEVICE — DRESSING FOAM W4XL10IN AG SIL ADH ANTIMIC POSTOP OPTIFOAM

## (undated) DEVICE — STOCKINETTE,IMPERVIOUS,12X48,STERILE: Brand: MEDLINE

## (undated) DEVICE — INTENDED FOR TISSUE SEPARATION, AND OTHER PROCEDURES THAT REQUIRE A SHARP SURGICAL BLADE TO PUNCTURE OR CUT.: Brand: BARD-PARKER ® CARBON RIB-BACK BLADES

## (undated) DEVICE — GLOVE SURG SZ 75 L12IN FNGR THK87MIL WHT LTX FREE

## (undated) DEVICE — THE DISPOSABLE ROTH NET FOREIGN BODY STANDARD RETRIEVAL DEVICE IS USED IN THE ENDOSCOPIC RETRIEVAL OF FOREIGN BODY, FOOD BOLUS AND EXCISED TISSUE SUCH AS POLYPS.: Brand: ROTH NET

## (undated) DEVICE — C-ARM: Brand: UNBRANDED

## (undated) DEVICE — ROD RM L1150MM DIA2.5MM TI W/ EXTN BALL TIP FOR IM NAIL

## (undated) DEVICE — HANDPIECE SET WITH COAXIAL HIGH FLOW TIP AND SUCTION TUBE: Brand: INTERPULSE

## (undated) DEVICE — Z DUP USE 2522782 SOLUTION IRRIG 1000ML STRL H2O PLAS CONTAINER UROMATIC

## (undated) DEVICE — BLOCK BITE 60FR RUBBER ADLT DENTAL

## (undated) DEVICE — BAG SPECIMEN BIOHAZARD 6IN X 9IN

## (undated) DEVICE — CO2 CANNULA,SUPERSOFT, ADLT,7'O2,7'CO2: Brand: MEDLINE

## (undated) DEVICE — FORCEP BX MESH TOOTH MIC 2.8 MMX240 CM NDL STRL RADIAL JAW 4

## (undated) DEVICE — NEPTUNE E-SEP SMOKE EVACUATION PENCIL, COATED, 70MM BLADE, PUSH BUTTON SWITCH: Brand: NEPTUNE E-SEP

## (undated) DEVICE — SUTURE VCRL SZ 2-0 L27IN ABSRB UD L22MM X-1 1/2 CIR REV CUT J459H

## (undated) DEVICE — CHECK-FLO HEMOSTASIS ASSEMBLY: Brand: CHECK-FLO

## (undated) DEVICE — SPONGE LAP W18XL18IN WHT COT 4 PLY FLD STRUNG RADPQ DISP ST

## (undated) DEVICE — STERILE PATIENT PROTECTIVE PAD FOR IMP® KNEE POSITIONERS & COHESIVE WRAP (10 / CASE): Brand: DE MAYO KNEE POSITIONER®

## (undated) DEVICE — DRAPE,U/ SHT,SPLIT,PLAS,STERIL: Brand: MEDLINE

## (undated) DEVICE — DILATOR ENDOSCP L180CM DIA6FR BLLN L8CM DIA54-60FR

## (undated) DEVICE — APPLICATOR MEDICATED 26 CC SOLUTION HI LT ORNG CHLORAPREP

## (undated) DEVICE — BIT DRL L145MM DIA4.2MM NONSTERILE 3 FLUT NDL PNT QUIK CPL

## (undated) DEVICE — GAUZE,SPONGE,4"X4",16PLY,STRL,LF,10/TRAY: Brand: MEDLINE

## (undated) DEVICE — ESOPHAGEAL BALLOON DILATATION CATHETER: Brand: CRE FIXED WIRE

## (undated) DEVICE — ELECTRODE PT RET AD L9FT HI MOIST COND ADH HYDRGEL CORDED

## (undated) DEVICE — GLOVE EXAM M L95IN FNGR THK35MIL PALM THK24MIL OFF WHT